# Patient Record
Sex: FEMALE | Race: WHITE | HISPANIC OR LATINO | Employment: UNEMPLOYED | URBAN - METROPOLITAN AREA
[De-identification: names, ages, dates, MRNs, and addresses within clinical notes are randomized per-mention and may not be internally consistent; named-entity substitution may affect disease eponyms.]

---

## 2017-03-20 DIAGNOSIS — Z01.812 PRE-OPERATIVE LABORATORY EXAMINATION: ICD-10-CM

## 2017-03-20 LAB
ANION GAP SERPL CALC-SCNC: 8 MMOL/L (ref 0–11.9)
BUN SERPL-MCNC: 19 MG/DL (ref 8–22)
CALCIUM SERPL-MCNC: 9.6 MG/DL (ref 8.5–10.5)
CHLORIDE SERPL-SCNC: 96 MMOL/L (ref 96–112)
CO2 SERPL-SCNC: 29 MMOL/L (ref 20–33)
CREAT SERPL-MCNC: 3.92 MG/DL (ref 0.5–1.4)
GFR SERPL CREATININE-BSD FRML MDRD: 15 ML/MIN/1.73 M 2
GLUCOSE SERPL-MCNC: 101 MG/DL (ref 65–99)
POTASSIUM SERPL-SCNC: 3.8 MMOL/L (ref 3.6–5.5)
SODIUM SERPL-SCNC: 133 MMOL/L (ref 135–145)

## 2017-03-20 PROCEDURE — 80048 BASIC METABOLIC PNL TOTAL CA: CPT

## 2017-03-20 PROCEDURE — 36415 COLL VENOUS BLD VENIPUNCTURE: CPT

## 2017-03-20 RX ORDER — MYCOPHENOLIC ACID 360 MG/1
360 TABLET, DELAYED RELEASE ORAL 2 TIMES DAILY
COMMUNITY
End: 2017-08-26

## 2017-03-20 RX ORDER — HYDROXYCHLOROQUINE SULFATE 200 MG/1
200 TABLET, FILM COATED ORAL
COMMUNITY
End: 2017-08-31 | Stop reason: SDUPTHER

## 2017-03-20 RX ORDER — PREDNISONE 5 MG/1
5 TABLET ORAL
COMMUNITY
End: 2017-11-23

## 2017-03-20 RX ORDER — ATENOLOL 25 MG/1
25 TABLET ORAL
COMMUNITY
End: 2017-12-01 | Stop reason: SDUPTHER

## 2017-03-21 ENCOUNTER — HOSPITAL ENCOUNTER (OUTPATIENT)
Facility: MEDICAL CENTER | Age: 22
End: 2017-03-21
Attending: SURGERY | Admitting: SURGERY
Payer: COMMERCIAL

## 2017-03-21 ENCOUNTER — APPOINTMENT (OUTPATIENT)
Dept: RADIOLOGY | Facility: MEDICAL CENTER | Age: 22
End: 2017-03-21
Attending: SURGERY
Payer: COMMERCIAL

## 2017-03-21 VITALS
OXYGEN SATURATION: 100 % | HEIGHT: 67 IN | HEART RATE: 78 BPM | SYSTOLIC BLOOD PRESSURE: 106 MMHG | TEMPERATURE: 98.6 F | BODY MASS INDEX: 24.05 KG/M2 | WEIGHT: 153.22 LBS | RESPIRATION RATE: 18 BRPM | DIASTOLIC BLOOD PRESSURE: 69 MMHG

## 2017-03-21 DIAGNOSIS — I77.0 ARTERIOVENOUS FISTULA, ACQUIRED (HCC): ICD-10-CM

## 2017-03-21 LAB
ANION GAP SERPL CALC-SCNC: 12 MMOL/L (ref 0–11.9)
BUN SERPL-MCNC: 28 MG/DL (ref 8–22)
CALCIUM SERPL-MCNC: 9.1 MG/DL (ref 8.5–10.5)
CHLORIDE SERPL-SCNC: 100 MMOL/L (ref 96–112)
CO2 SERPL-SCNC: 24 MMOL/L (ref 20–33)
CREAT SERPL-MCNC: 5.62 MG/DL (ref 0.5–1.4)
GFR SERPL CREATININE-BSD FRML MDRD: 10 ML/MIN/1.73 M 2
GLUCOSE SERPL-MCNC: 89 MG/DL (ref 65–99)
HCG SERPL QL: NEGATIVE
POTASSIUM SERPL-SCNC: 4.3 MMOL/L (ref 3.6–5.5)
SODIUM SERPL-SCNC: 136 MMOL/L (ref 135–145)

## 2017-03-21 PROCEDURE — 4410202 IR-INTRO PTA DIALYSIS CIRCUIT IMG S&I

## 2017-03-21 PROCEDURE — 84703 CHORIONIC GONADOTROPIN ASSAY: CPT

## 2017-03-21 PROCEDURE — 36902 INTRO CATH DIALYSIS CIRCUIT: CPT

## 2017-03-21 PROCEDURE — 80048 BASIC METABOLIC PNL TOTAL CA: CPT

## 2017-03-21 PROCEDURE — 160002 HCHG RECOVERY MINUTES (STAT)

## 2017-03-21 PROCEDURE — A9270 NON-COVERED ITEM OR SERVICE: HCPCS

## 2017-03-21 PROCEDURE — 700117 HCHG RX CONTRAST REV CODE 255: Performed by: SURGERY

## 2017-03-21 PROCEDURE — 700102 HCHG RX REV CODE 250 W/ 637 OVERRIDE(OP)

## 2017-03-21 PROCEDURE — 700111 HCHG RX REV CODE 636 W/ 250 OVERRIDE (IP)

## 2017-03-21 RX ORDER — MIDAZOLAM HYDROCHLORIDE 1 MG/ML
INJECTION INTRAMUSCULAR; INTRAVENOUS
Status: COMPLETED
Start: 2017-03-21 | End: 2017-03-21

## 2017-03-21 RX ORDER — OXYCODONE HYDROCHLORIDE 5 MG/1
TABLET ORAL
Status: COMPLETED
Start: 2017-03-21 | End: 2017-03-21

## 2017-03-21 RX ORDER — SODIUM CHLORIDE 9 MG/ML
500 INJECTION, SOLUTION INTRAVENOUS PRN
Status: DISCONTINUED | OUTPATIENT
Start: 2017-03-21 | End: 2017-03-21 | Stop reason: HOSPADM

## 2017-03-21 RX ORDER — IODIXANOL 270 MG/ML
24 INJECTION, SOLUTION INTRAVASCULAR ONCE
Status: COMPLETED | OUTPATIENT
Start: 2017-03-21 | End: 2017-03-21

## 2017-03-21 RX ORDER — DEXTROSE AND SODIUM CHLORIDE 5; .45 G/100ML; G/100ML
INJECTION, SOLUTION INTRAVENOUS CONTINUOUS
Status: DISCONTINUED | OUTPATIENT
Start: 2017-03-21 | End: 2017-03-21 | Stop reason: HOSPADM

## 2017-03-21 RX ORDER — MIDAZOLAM HYDROCHLORIDE 1 MG/ML
.5-2 INJECTION INTRAMUSCULAR; INTRAVENOUS PRN
Status: ACTIVE | OUTPATIENT
Start: 2017-03-21 | End: 2017-03-21

## 2017-03-21 RX ORDER — OXYCODONE HYDROCHLORIDE 5 MG/1
2.5 TABLET ORAL
Status: DISCONTINUED | OUTPATIENT
Start: 2017-03-21 | End: 2017-03-21 | Stop reason: HOSPADM

## 2017-03-21 RX ADMIN — MIDAZOLAM HYDROCHLORIDE 1 MG: 1 INJECTION INTRAMUSCULAR; INTRAVENOUS at 10:23

## 2017-03-21 RX ADMIN — OXYCODONE HYDROCHLORIDE 2.5 MG: 5 TABLET ORAL at 13:45

## 2017-03-21 RX ADMIN — FENTANYL CITRATE 25 MCG: 50 INJECTION, SOLUTION INTRAMUSCULAR; INTRAVENOUS at 10:23

## 2017-03-21 RX ADMIN — MIDAZOLAM HYDROCHLORIDE 1 MG: 1 INJECTION INTRAMUSCULAR; INTRAVENOUS at 10:28

## 2017-03-21 RX ADMIN — IODIXANOL 24 ML: 270 INJECTION, SOLUTION INTRAVASCULAR at 11:45

## 2017-03-21 RX ADMIN — MIDAZOLAM 1 MG: 1 INJECTION INTRAMUSCULAR; INTRAVENOUS at 10:23

## 2017-03-21 RX ADMIN — MIDAZOLAM HYDROCHLORIDE 1 MG: 1 INJECTION INTRAMUSCULAR; INTRAVENOUS at 11:10

## 2017-03-21 RX ADMIN — OXYCODONE HYDROCHLORIDE 2.5 MG: 5 TABLET ORAL at 12:00

## 2017-03-21 RX ADMIN — DEXTROSE AND SODIUM CHLORIDE: 5; .45 INJECTION, SOLUTION INTRAVENOUS at 08:30

## 2017-03-21 RX ADMIN — MIDAZOLAM HYDROCHLORIDE 1 MG: 1 INJECTION INTRAMUSCULAR; INTRAVENOUS at 10:51

## 2017-03-21 ASSESSMENT — PAIN SCALES - GENERAL
PAINLEVEL_OUTOF10: 4
PAINLEVEL_OUTOF10: 5
PAINLEVEL_OUTOF10: 2
PAINLEVEL_OUTOF10: 4
PAINLEVEL_OUTOF10: 0
PAINLEVEL_OUTOF10: 0
PAINLEVEL_OUTOF10: 2
PAINLEVEL_OUTOF10: 6
PAINLEVEL_OUTOF10: 3
PAINLEVEL_OUTOF10: 2

## 2017-03-21 NOTE — OP REPORT
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    POSTOPERATIVE DIAGNOSES:  Stage V kidney disease with right arm   brachiocephalic arteriovenous fistula with severe stenoses.    OPERATIONS PERFORMED:  1.  Antegrade and retrograde sheath placements, right arm under ultrasound and   fluoroscopic guidance.  2.  Retrograde and antegrade fistulogram and central venogram.  3.  Balloon angioplasty proximal mid arm with 7 mm Conquest and 8 mm Amonate.  4.  Balloon angioplasty cephalic arch with 7 mm Conquest and 8 mm Amonate.  5.  Balloon angioplasty proximal 8-10 cm of the AV fistula with a 6 mm x 6 cm   Ravi balloon.    SURGEON:  Erwin Livingston MD    ANESTHESIA:  Local and moderate conscious sedation.    SUMMARY OF FINDINGS:  A focal greater than 90% stenosis in the proximal mid   arm region that was short in length.  A similar degree of stenosis in the   cephalic arch that was 4 cm long.  The initial 8-10 cm of the fistula starting   at the anastomosis was approximately 5 mm in diameter, which was   significantly smaller than the overall fistula that was at least 8-9 mm in   diameter.  This area was characterized by some mild stenoses that we treated   in case they were more severe than the images suggested and in view of the   fact that we wanted to ensure satisfactory inflow.    DESCRIPTION OF PROCEDURE:  After obtaining informed consent, the patient was   positioned supine on the angiography table.  She was given moderate conscious   sedation and local anesthesia was also used through the sheath placements.    She tolerated the procedure well and remained stable.  Initially, I examined   the right arm region with ultrasound and just proximal to the distal site of   angioaccess, I placed a micropuncture needle, wire, and dilator directed   toward the shoulder.  Injection through the 5-Djiboutian dilator  imaged the   fistula and demonstrated the findings that I have described.  I placed a short   6-Romanian sheath at this location and then inflated a 7 mm Conquest balloon   across the stenosis in the proximal mid arm.  Then, I injected contrast with   reflux through the arterial end-to-end anastomosis showing a small brachial   artery with an adequate diameter arterial anastomosis and irregularity of a   long narrow segment that became suddenly larger in the mid distal biceps   region where the fistula has been accessed.  A Glidewire was passed through   the 2 lesions and we dilated the both with 7 mm Conquest.  After dilating to   greater than nominal diameter, the cephalic arch opening looked very small and   in fact looked substantially worse.  Flow was still present though and there   was no extravasation.  We placed an 8 mm Askov trying to improve the   insufficient dilatation that the 7 mm balloon had created.  We went up to 18   atmospheres in the mid arm and proximally 14 atmospheres at the cephalic arch   and both of these resulted in improvement of both lesions over their   pretreatment state, although there were certainly still smaller areas and they   ballooned out main channel of the fistula.  Flow was still rapid.  Next, I   placed a micropuncture needle, wire, and dilator directed toward the elbow and   placed a 4-Romanian sheath and a V-14 wire across the proximal fistula into the   brachial artery.  I dilated up to 4 atmospheres with a 6 mm Ravi balloon.    The actual diameter of the vein at this segment was 5 mm, so the dilatation   was adequate and it appeared to show some release of mild wasting of the   balloon as it was inflated in 3 locations to cover the entire segment.  Flow   was excellent at the end of procedure and the fistula nonpulsatile.  We   removed the sheath and held pressure for 6 minutes with good hemostasis.    Blood loss was less than 30 mL.  Tegaderm and gauze dressings were  applied.       ____________________________________     MD TREASURE SALEH / MADHU    DD:  03/21/2017 11:35:49  DT:  03/21/2017 12:34:49    D#:  [id number]  Job#:  [id number]

## 2017-03-21 NOTE — PROGRESS NOTES
IR Procedure Note:    Dr. Livingston performed a right arm fistulogram and angioplasty.  The pt tolerated the procedure well, vital signs stable.  Gauze and tegaderm applied to right forearm, pressure held x 10 minutes, CDI and soft.  Report given to Ander MARTINEZ.  PT and family transported back to [facility].

## 2017-03-21 NOTE — IP AVS SNAPSHOT
" Home Care Instructions                                                                                                                Name:Lily Nicole  Medical Record Number:[id number]  CSN: [id number]    YOB: 1995   Age: 21 y.o.  Sex: female  HT:1.702 m (5' 7\") (86 %, Z = 1.07, Source: [facility] 2-20 Years) WT: 69.5 kg (153 lb 3.5 oz) (84 %, Z = 0.98, Source: [facility] 2-20 Years)          Admit Date: 3/21/2017     Discharge Date:   Today's Date: 3/21/2017  Attending Doctor:  Erwin Livingston M.D.                  Allergies:  Review of patient's allergies indicates no known allergies.                Discharge Instructions         ACTIVITY: Rest and take it easy for the first 24 hours.  A responsible adult is recommended to remain with you during that time.  It is normal to feel sleepy.  We encourage you to not do anything that requires balance, judgment or coordination.    MILD FLU-LIKE SYMPTOMS ARE NORMAL. YOU MAY EXPERIENCE GENERALIZED MUSCLE ACHES, THROAT IRRITATION, HEADACHE AND/OR SOME NAUSEA.    FOR 24 HOURS DO NOT:  Drive, operate machinery or run household appliances.  Drink beer or alcoholic beverages.   Make important decisions or sign legal documents.    SPECIAL INSTRUCTIONS: Refer to av fistula information. No pushing, pulling or forceful movement for 24 hours.     DIET: To avoid nausea, slowly advance diet as tolerated, avoiding spicy or greasy foods for the first day.  Add more substantial food to your diet according to your physician's instructions.  Babies can be fed formula or breast milk as soon as they are hungry.  INCREASE FLUIDS AND FIBER TO AVOID CONSTIPATION.    SURGICAL DRESSING/BATHING: May remove dressing in 48 hours. May shower in 48 hours. Do not submerge site for 5 days.     FOLLOW-UP APPOINTMENT:  A follow-up appointment should be arranged with your doctor; call to schedule.    You should CALL YOUR PHYSICIAN if you develop:  Fever greater than 101 degrees F.  Pain not relieved by " medication, or persistent nausea or vomiting.  Excessive bleeding (blood soaking through dressing) or unexpected drainage from the wound.  Extreme redness or swelling around the incision site, drainage of pus or foul smelling drainage.  Inability to urinate or empty your bladder within 8 hours.  Problems with breathing or chest pain.    You should call 911 if you develop problems with breathing or chest pain.  If you are unable to contact your doctor or surgical center, you should go to the nearest emergency room or urgent care center.  Physician's telephone #: [phone]    If any questions arise, call your doctor.  If your doctor is not available, please feel free to call the Surgical Center at [phone].  The Center is open Monday through Friday from 7AM to 7PM.  You can also call the for[MD] HOTLINE open 24 hours/day, 7 days/week and speak to a nurse at [phone], or toll free at [phone].    A registered nurse may call you a few days after your surgery to see how you are doing after your procedure.    MEDICATIONS: Resume taking daily medication.  Take prescribed pain medication with food.  If no medication is prescribed, you may take non-aspirin pain medication if needed.  PAIN MEDICATION CAN BE VERY CONSTIPATING.  Take a stool softener or laxative such as senokot, pericolace, or milk of magnesia if needed.      Last pain medication given at 1200.    If your physician has prescribed pain medication that includes Acetaminophen (Tylenol), do not take additional Acetaminophen (Tylenol) while taking the prescribed medication.    Depression / Suicide Risk    As you are discharged from this [facility] facility, it is important to learn how to keep safe from harming yourself.    Recognize the warning signs:  · Abrupt changes in personality, positive or negative- including increase in energy   · Giving away possessions  · Change in eating patterns- significant weight changes-  positive or  negative  · Change in sleeping patterns- unable to sleep or sleeping all the time   · Unwillingness or inability to communicate  · Depression  · Unusual sadness, discouragement and loneliness  · Talk of wanting to die  · Neglect of personal appearance   · Rebelliousness- reckless behavior  · Withdrawal from people/activities they love  · Confusion- inability to concentrate     If you or a loved one observes any of these behaviors or has concerns about self-harm, here's what you can do:  · Talk about it- your feelings and reasons for harming yourself  · Remove any means that you might use to hurt yourself (examples: pills, rope, extension cords, firearm)  · Get professional help from the community (Mental Health, Substance Abuse, psychological counseling)  · Do not be alone:Call your Safe Contact- someone whom you trust who will be there for you.  · Call your local CRISIS HOTLINE 845-9630 or 123-803-5786  · Call your local Children's Mobile Crisis Response Team [place] (401) 189-7625 or www.[employer]le  · Call the toll free National Suicide Prevention Hotlines   · National Suicide Prevention Lifeline 943-630-DAJE (7492)  · National Hope Line Network 800-SUICIDE (962-9433)    AV Fistula, Care After  Refer to this sheet in the next few weeks. These instructions provide you with information on caring for yourself after your procedure. Your caregiver may also give you more specific instructions. Your treatment has been planned according to current medical practices, but problems sometimes occur. Call your caregiver if you have any problems or questions after your procedure.  HOME CARE INSTRUCTIONS   · Do not drive a car or take public transportation alone.  · Do not drink alcohol.  · Only take medicine that has been prescribed by your caregiver.  · Do not sign important papers or make important decisions.  · Have a responsible person with you.  · Ask your caregiver to show you how to check your access at home  "for a vibration (called a \"thrill\") or for a sound (called a \"bruit\" pronounced brew-ee).  · Your vein will need time to enlarge and mature so needles can be inserted for dialysis. Follow your caregiver's instructions about what you need to do to make this happen.  · Keep dressings clean and dry.  · Keep the arm elevated above your heart. Use a pillow.  · Rest.  · Use the arm as usual for all activities.  · Have the stitches or tape closures removed in 10 to 14 days, or as directed by your caregiver.  · Do not sleep or lie on the area of the fistula or that arm. This may decrease or stop the blood flow through your fistula.  · Do not allow blood pressures to be taken on this arm.  · Do not allow blood drawing to be done from the graft.  · Do not wear tight clothing around the access site or on the arm.  · Avoid lifting heavy objects with the arm that has the fistula.  · Do not use creams or lotions over the access site.  SEEK MEDICAL CARE IF:   · You have a fever.  · You have swelling around the fistula that gets worse, or you have new pain.  · You have unusual bleeding at the fistula site or from any other area.  · You have pus or other drainage at the fistula site.  · You have skin redness or red streaking on the skin around, above, or below the fistula site.  · Your access site feels warm.  · You have any flu-like symptoms.  SEEK IMMEDIATE MEDICAL CARE IF:   · You have pain, numbness, or an unusual pale skin on the hand or on the side of your fistula.  · You have dizziness or weakness that you have not had before.  · You have shortness of breath.  · You have chest pain.  · Your fistula disconnects or breaks, and there is bleeding that cannot be easily controlled.  Call for local emergency medical help. Do not try to drive yourself to the hospital.  MAKE SURE YOU  · Understand these instructions.  · Will watch your condition.  · Will get help right away if you are not doing well or get worse.     This information " is not intended to replace advice given to you by your health care provider. Make sure you discuss any questions you have with your health care provider.     Document Released: 12/18/2006 Document Revised: 01/08/2016 Document Reviewed: 06/06/2012  [employer] Interactive Patient Education ©2016 [employer]         Medication List      CONTINUE taking these medications        Instructions    atenolol 25 MG Tabs   Commonly known as:  TENORMIN    Take 25 mg by mouth every bedtime.   Dose:  25 mg       hydroxychloroquine 200 MG Tabs   Commonly known as:  PLAQUENIL    Take 400 mg by mouth every bedtime.   Dose:  400 mg       MYFORTIC 360 MG Tbec tablet   Generic drug:  mycophenolate sodium    Take 360 mg by mouth 2 Times a Day.   Dose:  360 mg       predniSONE 5 MG Tabs   Commonly known as:  DELTASONE    Take 5 mg by mouth every bedtime.   Dose:  5 mg       ZOFRAN 4 MG Tabs tablet   Generic drug:  ondansetron    Take 4 mg by mouth every 6 hours as needed.   Dose:  4 mg               Medication Information     Above and/or attached are the medications your physician expects you to take upon discharge. Review all of your home medications and newly ordered medications with your doctor and/or pharmacist. Follow medication instructions as directed by your doctor and/or pharmacist. Please keep your medication list with you and share with your physician. Update the information when medications are discontinued, doses are changed, or new medications (including over-the-counter products) are added; and carry medication information at all times in the event of emergency situations.        Resources     Quit Smoking / Tobacco Use:    I understand the use of any tobacco products increases my chance of suffering from future heart disease or stroke and could cause other illnesses which may shorten my life. Quitting the use of tobacco products is the single most important thing I can do to improve my health. For further information on  smoking / tobacco cessation call a Toll Free Quit Line at 1-393.586.7581 (*National Cancer [place]) or 1-685.530.6351 (American Lung Association) or you can access the web based program at www.lungusa.org.    [place] Tobacco Users Help Line:  (137) 501-8193       Toll Free: 1-735.958.7101  Quit Tobacco Program [facility] (741)717-9935    Crisis Hotline:    [place] Crisis Hotline:  9-578-CDYCXDP or 1-894.387.2167    [place] Crisis Hotline:    1-392.630.7450 or 359-640-6295    Discharge Survey:   Thank you for choosing [facility]. We hope we did everything we could to make your hospital stay a pleasant one. You may be receiving a survey and we would appreciate your time and participation in answering the questions. Your input is very valuable to us in our efforts to improve our service to our patients and their families.            Signatures     My signature on this form indicates that:    1. I acknowledge receipt and understanding of these Home Care Instruction.  2. My questions regarding this information have been answered to my satisfaction.  3. I have formulated a plan with my discharge nurse to obtain my prescribed medications for home.    __________________________________      __________________________________                   Patient Signature                                 Guardian/Responsible Adult Signature      __________________________________                 __________       ________                       Nurse Signature                                               Date                 Time

## 2017-03-21 NOTE — PROGRESS NOTES
Pt having pain. Called Dr. Livingston for order. Order for another dose of Oxy IR ordered. And also received order for meds for home. Mother to  Rx at office.     Pt. fistula site CDI with no s/s of bleeding or hematoma. Pt. Meets discharge criteria. Pt. And responsible adult given discharge instructions. Pt. And responsible adult educated and all questions answered. Signed copy on chart. All lines and drains DC'd. Pt. Belongings with Pt and Pt. Transported via wheel chair to car with escort.

## 2017-03-21 NOTE — IP AVS SNAPSHOT
3/21/2017          Lily Nicole  [address] [place] [address]    Dear Lily:    [facility] wants to ensure your discharge home is safe and you or your loved ones have had all your questions answered regarding your care after you leave the hospital.    You may receive a telephone call within two days of your discharge.  This call is to make certain you understand your discharge instructions as well as ensure we provided you with the best care possible during your stay with us.     The call will only last approximately 3-5 minutes and will be done by a nurse.    Once again, we want to ensure your discharge home is safe and that you have a clear understanding of any next steps in your care.  If you have any questions or concerns, please do not hesitate to contact us, we are here for you.  Thank you for choosing [facility] for your healthcare needs.    Sincerely,    Jamal Lott    [facility]

## 2017-03-21 NOTE — PROGRESS NOTES
Report received and Pt admitted to [facility] 3 s/p fistulagram. Site CDI with no s/s of bleeding or hematoma. Thrill and bruit present. Pt attached to all leads and monitors. VSS with complaints of pain and no nausea. Fluids and food offered. Orders reviewed.  Family at bedside.     1200: pain medication given see mar.

## 2017-03-21 NOTE — DISCHARGE INSTRUCTIONS
ACTIVITY: Rest and take it easy for the first 24 hours.  A responsible adult is recommended to remain with you during that time.  It is normal to feel sleepy.  We encourage you to not do anything that requires balance, judgment or coordination.    MILD FLU-LIKE SYMPTOMS ARE NORMAL. YOU MAY EXPERIENCE GENERALIZED MUSCLE ACHES, THROAT IRRITATION, HEADACHE AND/OR SOME NAUSEA.    FOR 24 HOURS DO NOT:  Drive, operate machinery or run household appliances.  Drink beer or alcoholic beverages.   Make important decisions or sign legal documents.    SPECIAL INSTRUCTIONS: Refer to av fistula information. No pushing, pulling or forceful movement for 24 hours.     DIET: To avoid nausea, slowly advance diet as tolerated, avoiding spicy or greasy foods for the first day.  Add more substantial food to your diet according to your physician's instructions.  Babies can be fed formula or breast milk as soon as they are hungry.  INCREASE FLUIDS AND FIBER TO AVOID CONSTIPATION.    SURGICAL DRESSING/BATHING: May remove dressing in 48 hours. May shower in 48 hours. Do not submerge site for 5 days.     FOLLOW-UP APPOINTMENT:  A follow-up appointment should be arranged with your doctor; call to schedule.    You should CALL YOUR PHYSICIAN if you develop:  Fever greater than 101 degrees F.  Pain not relieved by medication, or persistent nausea or vomiting.  Excessive bleeding (blood soaking through dressing) or unexpected drainage from the wound.  Extreme redness or swelling around the incision site, drainage of pus or foul smelling drainage.  Inability to urinate or empty your bladder within 8 hours.  Problems with breathing or chest pain.    You should call 911 if you develop problems with breathing or chest pain.  If you are unable to contact your doctor or surgical center, you should go to the nearest emergency room or urgent care center.  Physician's telephone #: [phone]    If any questions arise, call your doctor.  If your doctor is  not available, please feel free to call the Surgical Center at [phone].  The Center is open Monday through Friday from 7AM to 7PM.  You can also call the HEALTH HOTLINE open 24 hours/day, 7 days/week and speak to a nurse at [phone], or toll free at [phone].    A registered nurse may call you a few days after your surgery to see how you are doing after your procedure.    MEDICATIONS: Resume taking daily medication.  Take prescribed pain medication with food.  If no medication is prescribed, you may take non-aspirin pain medication if needed.  PAIN MEDICATION CAN BE VERY CONSTIPATING.  Take a stool softener or laxative such as senokot, pericolace, or milk of magnesia if needed.      Last pain medication given at 1200.    If your physician has prescribed pain medication that includes Acetaminophen (Tylenol), do not take additional Acetaminophen (Tylenol) while taking the prescribed medication.    Depression / Suicide Risk    As you are discharged from this [facility] facility, it is important to learn how to keep safe from harming yourself.    Recognize the warning signs:  · Abrupt changes in personality, positive or negative- including increase in energy   · Giving away possessions  · Change in eating patterns- significant weight changes-  positive or negative  · Change in sleeping patterns- unable to sleep or sleeping all the time   · Unwillingness or inability to communicate  · Depression  · Unusual sadness, discouragement and loneliness  · Talk of wanting to die  · Neglect of personal appearance   · Rebelliousness- reckless behavior  · Withdrawal from people/activities they love  · Confusion- inability to concentrate     If you or a loved one observes any of these behaviors or has concerns about self-harm, here's what you can do:  · Talk about it- your feelings and reasons for harming yourself  · Remove any means that you might use to hurt yourself (examples: pills, rope, extension cords,  "firearm)  · Get professional help from the community (Mental Health, Substance Abuse, psychological counseling)  · Do not be alone:Call your Safe Contact- someone whom you trust who will be there for you.  · Call your local CRISIS HOTLINE 686-7441 or 039-237-0605  · Call your local Children's Mobile Crisis Response Team [place] (139) 274-9859 or www.[employer]  · Call the toll free National Suicide Prevention Hotlines   · National Suicide Prevention Lifeline 486-913-VHRO (0500)  · National Hope Line Network 800-SUICIDE (536-8065)    AV Fistula, Care After  Refer to this sheet in the next few weeks. These instructions provide you with information on caring for yourself after your procedure. Your caregiver may also give you more specific instructions. Your treatment has been planned according to current medical practices, but problems sometimes occur. Call your caregiver if you have any problems or questions after your procedure.  HOME CARE INSTRUCTIONS   · Do not drive a car or take public transportation alone.  · Do not drink alcohol.  · Only take medicine that has been prescribed by your caregiver.  · Do not sign important papers or make important decisions.  · Have a responsible person with you.  · Ask your caregiver to show you how to check your access at home for a vibration (called a \"thrill\") or for a sound (called a \"bruit\" pronounced brew-ee).  · Your vein will need time to enlarge and mature so needles can be inserted for dialysis. Follow your caregiver's instructions about what you need to do to make this happen.  · Keep dressings clean and dry.  · Keep the arm elevated above your heart. Use a pillow.  · Rest.  · Use the arm as usual for all activities.  · Have the stitches or tape closures removed in 10 to 14 days, or as directed by your caregiver.  · Do not sleep or lie on the area of the fistula or that arm. This may decrease or stop the blood flow through your fistula.  · Do not allow blood " pressures to be taken on this arm.  · Do not allow blood drawing to be done from the graft.  · Do not wear tight clothing around the access site or on the arm.  · Avoid lifting heavy objects with the arm that has the fistula.  · Do not use creams or lotions over the access site.  SEEK MEDICAL CARE IF:   · You have a fever.  · You have swelling around the fistula that gets worse, or you have new pain.  · You have unusual bleeding at the fistula site or from any other area.  · You have pus or other drainage at the fistula site.  · You have skin redness or red streaking on the skin around, above, or below the fistula site.  · Your access site feels warm.  · You have any flu-like symptoms.  SEEK IMMEDIATE MEDICAL CARE IF:   · You have pain, numbness, or an unusual pale skin on the hand or on the side of your fistula.  · You have dizziness or weakness that you have not had before.  · You have shortness of breath.  · You have chest pain.  · Your fistula disconnects or breaks, and there is bleeding that cannot be easily controlled.  Call for local emergency medical help. Do not try to drive yourself to the hospital.  MAKE SURE YOU  · Understand these instructions.  · Will watch your condition.  · Will get help right away if you are not doing well or get worse.     This information is not intended to replace advice given to you by your health care provider. Make sure you discuss any questions you have with your health care provider.     Document Released: 12/18/2006 Document Revised: 01/08/2016 Document Reviewed: 06/06/2012  [employer] Interactive Patient Education ©2016 [employer]

## 2017-03-29 ENCOUNTER — HOSPITAL ENCOUNTER (OUTPATIENT)
Dept: LAB | Facility: MEDICAL CENTER | Age: 22
End: 2017-03-29
Attending: PHYSICIAN ASSISTANT
Payer: COMMERCIAL

## 2017-03-29 LAB
C3 SERPL-MCNC: 149 MG/DL (ref 87–200)
C4 SERPL-MCNC: 33 MG/DL (ref 19–52)

## 2017-03-29 PROCEDURE — 86225 DNA ANTIBODY NATIVE: CPT

## 2017-03-29 PROCEDURE — 86160 COMPLEMENT ANTIGEN: CPT | Mod: 91

## 2017-03-29 PROCEDURE — 36415 COLL VENOUS BLD VENIPUNCTURE: CPT

## 2017-03-31 LAB — DSDNA AB TITR SER CLIF: NORMAL {TITER}

## 2017-05-18 ENCOUNTER — HOSPITAL ENCOUNTER (EMERGENCY)
Facility: MEDICAL CENTER | Age: 22
End: 2017-05-19
Payer: COMMERCIAL

## 2017-05-18 VITALS
TEMPERATURE: 99.1 F | OXYGEN SATURATION: 98 % | RESPIRATION RATE: 18 BRPM | DIASTOLIC BLOOD PRESSURE: 75 MMHG | HEIGHT: 67 IN | WEIGHT: 150.35 LBS | SYSTOLIC BLOOD PRESSURE: 110 MMHG | BODY MASS INDEX: 23.6 KG/M2 | HEART RATE: 93 BPM

## 2017-05-18 PROCEDURE — 302449 STATCHG TRIAGE ONLY (STATISTIC)

## 2017-05-18 ASSESSMENT — PAIN SCALES - GENERAL: PAINLEVEL_OUTOF10: 6

## 2017-05-19 ENCOUNTER — OFFICE VISIT (OUTPATIENT)
Dept: URGENT CARE | Facility: PHYSICIAN GROUP | Age: 22
End: 2017-05-19
Payer: COMMERCIAL

## 2017-05-19 VITALS
TEMPERATURE: 96.7 F | WEIGHT: 149.69 LBS | HEIGHT: 67 IN | SYSTOLIC BLOOD PRESSURE: 106 MMHG | BODY MASS INDEX: 23.49 KG/M2 | HEART RATE: 88 BPM | DIASTOLIC BLOOD PRESSURE: 62 MMHG | OXYGEN SATURATION: 98 %

## 2017-05-19 DIAGNOSIS — L30.9 DERMATITIS: ICD-10-CM

## 2017-05-19 DIAGNOSIS — L29.9 PRURITUS: ICD-10-CM

## 2017-05-19 PROCEDURE — 99203 OFFICE O/P NEW LOW 30 MIN: CPT | Performed by: PHYSICIAN ASSISTANT

## 2017-05-19 RX ORDER — HYDROXYZINE HYDROCHLORIDE 25 MG/1
25 TABLET, FILM COATED ORAL 3 TIMES DAILY PRN
Qty: 30 TAB | Refills: 0 | Status: SHIPPED | OUTPATIENT
Start: 2017-05-19 | End: 2017-08-01

## 2017-05-19 RX ORDER — TRIAMCINOLONE ACETONIDE 1 MG/ML
LOTION TOPICAL
Qty: 1 BOTTLE | Refills: 1 | Status: SHIPPED | OUTPATIENT
Start: 2017-05-19 | End: 2017-07-29

## 2017-05-19 ASSESSMENT — ENCOUNTER SYMPTOMS
DIARRHEA: 0
VOMITING: 0
CHILLS: 0
ABDOMINAL PAIN: 0
MUSCULOSKELETAL NEGATIVE: 1
DIZZINESS: 0
NAIL CHANGES: 0
HEADACHES: 0
SHORTNESS OF BREATH: 0
FEVER: 0
NAUSEA: 0

## 2017-05-19 NOTE — ED NOTES
"Chief Complaint   Patient presents with   • Rash     since this afternoon. Began as itchy rash on right hand, now spread \"all over\".     Ambulatory to triage for above. VSS. NAD. Explained triage process, to waiting room. Asked to inform RN if questions or concerns arise.   "

## 2017-05-19 NOTE — MR AVS SNAPSHOT
"        Lily Nicole   2017 10:10 AM   Office Visit   MRN: [id number]    Department:  [facility]   Dept Phone:  [phone]    Description:  Female : 1995   Provider:  Selam Sarabia PA-C           Reason for Visit     Rash rash on whole body x 1 day, itchy & painful      Allergies as of 2017     No Known Allergies      You were diagnosed with     Dermatitis   [888090]       Pruritus   [788405]         Vital Signs     Blood Pressure Pulse Temperature Height Weight Body Mass Index    106/62 mmHg 88 35.9 °C (96.7 °F) 1.702 m (5' 7\") 67.9 kg (149 lb 11.1 oz) 23.44 kg/m2    Oxygen Saturation Smoking Status                98% Never Smoker           Basic Information     Date Of Birth Sex Race Ethnicity Preferred Language    1995 Female  or   Origin (Danish,Panamanian,Macanese,[place]er, etc) English      Your appointments     Aug 14, 2017  3:20 PM   New Patient with Jeana London M.D.   [facility] (--)    [address] [place] [address], [address]  [facility] [id number] [phone]           Please bring Photo ID, Insurance Cards, All Medication Bottles and copies of any legal documents (such as Living Will, Power of ) If speaking a language besides English please bring an adult . Please arrive 30 minutes prior for check in and registration. You will be receiving a confirmation call a few days before your appointment from our automated call confirmation system.              Problem List              ICD-10-CM Priority Class Noted - Resolved    Arteriovenous fistula, acquired (CMS-HCC) I77.0   3/21/2017 - Present      Health Maintenance     Patient has no pending health maintenance at this time      Current Immunizations     No immunizations on file.      Below and/or attached are the medications your provider expects you to take. Review all of your home medications and newly ordered medications with your provider and/or pharmacist. Follow " medication instructions as directed by your provider and/or pharmacist. Please keep your medication list with you and share with your provider. Update the information when medications are discontinued, doses are changed, or new medications (including over-the-counter products) are added; and carry medication information at all times in the event of emergency situations     Allergies:  No Known Allergies          Medications  Valid as of: May 19, 2017 - 12:38 PM    Generic Name Brand Name Tablet Size Instructions for use    Atenolol (Tab) TENORMIN 25 MG Take 25 mg by mouth every bedtime.        Hydroxychloroquine Sulfate (Tab) PLAQUENIL 200 MG Take 400 mg by mouth every bedtime.        HydrOXYzine HCl (Tab) ATARAX 25 MG Take 1 Tab by mouth 3 times a day as needed for Itching.        Mycophenolate Sodium (Tablet Delayed Response) MYFORTIC 360 MG Take 360 mg by mouth 2 Times a Day.        Ondansetron HCl (Tab) ZOFRAN 4 MG Take 4 mg by mouth every 6 hours as needed.        PredniSONE (Tab) DELTASONE 5 MG Take 5 mg by mouth every bedtime.        Triamcinolone Acetonide (Lotion) KENALOG 0.1 % Apply this film to affected areas twice daily for 1 week, then once daily for 1 week        .                 Medicines prescribed today were sent to:     [facility] [id number] [place] [address] [address] AT [facility] & [facility]    [address] [facility] [address]    Phone: [phone] Fax: [phone]    Open 24 Hours?: No    [facility] [address] [facility], [place] - [address] [facility]    [address] [facility] [address]    Phone: [phone] Fax: [phone]    Open 24 Hours?: No      Medication refill instructions:       If your prescription bottle indicates you have medication refills left, it is not necessary to call your provider’s office. Please contact your pharmacy and they will refill your medication.    If your prescription bottle indicates you do not have any refills left, you may  request refills at any time through one of the following ways: The online Simple IT system (except Urgent Care), by calling your provider’s office, or by asking your pharmacy to contact your provider’s office with a refill request. Medication refills are processed only during regular business hours and may not be available until the next business day. Your provider may request additional information or to have a follow-up visit with you prior to refilling your medication.   *Please Note: Medication refills are assigned a new Rx number when refilled electronically. Your pharmacy may indicate that no refills were authorized even though a new prescription for the same medication is available at the pharmacy. Please request the medicine by name with the pharmacy before contacting your provider for a refill.           Simple IT Access Code: [email or web address]  Expires: 6/18/2017 12:38 PM    Simple IT  A secure, online tool to manage your health information     How do you roll?’s Simple IT® is a secure, online tool that connects you to your personalized health information from the privacy of your home -- day or night - making it very easy for you to manage your healthcare. Once the activation process is completed, you can even access your medical information using the Simple IT tg, which is available for free in the Apple Tg store or Google Play store.     Simple IT provides the following levels of access (as shown below):   My Chart Features   [facility] Doctor [facility]  Urgent  Care Non-[facility]  Primary Care  Doctor   Email your healthcare team securely and privately 24/7 X X X    Manage appointments: schedule your next appointment; view details of past/upcoming appointments X      Request prescription refills. X      View recent personal medical records, including lab and immunizations X X X X   View health record, including health history, allergies, medications X X X X   Read reports about your outpatient  visits, procedures, consult and ER notes X X X X   See your discharge summary, which is a recap of your hospital and/or ER visit that includes your diagnosis, lab results, and care plan. X X       How to register for Inter[employer]:  1. Go to  [email or web address].  2. Click on the Sign Up Now box, which takes you to the New Member Sign Up page. You will need to provide the following information:  a. Enter your InterResolve Access Code exactly as it appears at the top of this page. (You will not need to use this code after you’ve completed the sign-up process. If you do not sign up before the expiration date, you must request a new code.)   b. Enter your date of birth.   c. Enter your home email address.   d. Click Submit, and follow the next screen’s instructions.  3. Create a InterResolve ID. This will be your [employer] login ID and cannot be changed, so think of one that is secure and easy to remember.  4. Create a InterResolve password. You can change your password at any time.  5. Enter your Password Reset Question and Answer. This can be used at a later time if you forget your password.   6. Enter your e-mail address. This allows you to receive e-mail notifications when new information is available in InterResolve.  7. Click Sign Up. You can now view your health information.    For assistance activating your InterResolve account, call [phone]

## 2017-05-19 NOTE — PROGRESS NOTES
"Subjective:      Lily Nicole is a 21 y.o. female who presents with Rash    Patient is accompanied by her mother.         Rash  This is a new problem. The current episode started yesterday. The problem is unchanged. The affected locations include the left wrist, right wrist, right elbow, left elbow, left lower leg and right lowerleg. She was exposed to nothing. Pertinent negatives include no diarrhea, facial edema, fever, joint pain, nail changes, shortness of breath or vomiting. Past treatments include nothing. There is no history of allergies, asthma or eczema.     Patient presents to urgent care for an extremely pruritic rash x 1 day. She states she first noticed it after getting out of the shower on her right hand, but it quickly spread to both elbows, both knees, and her back. She denies any history of known skin problems. No new soaps, detergents, lotions, or clothing. She tried taking benadryl last night for the itching without much relief. PMH includes Lupus and she is currently taking Plaquenil and Prednisone daily.     Review of Systems   Constitutional: Negative for fever and chills.   Respiratory: Negative for shortness of breath.    Cardiovascular: Negative for chest pain.   Gastrointestinal: Negative for nausea, vomiting, abdominal pain and diarrhea.   Genitourinary: Negative.    Musculoskeletal: Negative.  Negative for joint pain.   Skin: Positive for itching and rash. Negative for nail changes.   Neurological: Negative for dizziness and headaches.          Objective:     /62 mmHg  Pulse 88  Temp(Src) 35.9 °C (96.7 °F)  Ht 1.702 m (5' 7\")  Wt 67.9 kg (149 lb 11.1 oz)  BMI 23.44 kg/m2  SpO2 98%     Physical Exam   Constitutional: She is oriented to person, place, and time. She appears well-developed and well-nourished. No distress.   HENT:   Head: Normocephalic and atraumatic.   Eyes: Conjunctivae are normal. Pupils are equal, round, and reactive to light. Right eye exhibits no " discharge. Left eye exhibits no discharge.   Neck: Normal range of motion.   Cardiovascular: Normal rate.    Pulmonary/Chest: Effort normal.   Musculoskeletal: Normal range of motion.   Neurological: She is alert and oriented to person, place, and time.   Skin: Skin is warm and dry. Rash noted. Rash is maculopapular. She is not diaphoretic.        Psychiatric: She has a normal mood and affect. Her behavior is normal.   Nursing note and vitals reviewed.         PMH:  has a past medical history of Hypertension; Renal disorder; Seizure (CMS-HCC) (2013); Infectious disease; and Dialysis patient (CMS-HCC).  MEDS:   Current outpatient prescriptions:   •  triamcinolone (KENALOG) 0.1 % lotion, Apply this film to affected areas twice daily for 1 week, then once daily for 1 week, Disp: 1 Bottle, Rfl: 1  •  hydrOXYzine (ATARAX) 25 MG Tab, Take 1 Tab by mouth 3 times a day as needed for Itching., Disp: 30 Tab, Rfl: 0  •  atenolol (TENORMIN) 25 MG Tab, Take 25 mg by mouth every bedtime., Disp: , Rfl:   •  predniSONE (DELTASONE) 5 MG Tab, Take 5 mg by mouth every bedtime., Disp: , Rfl:   •  hydroxychloroquine (PLAQUENIL) 200 MG Tab, Take 400 mg by mouth every bedtime., Disp: , Rfl:   •  mycophenolate sodium (MYFORTIC) 360 MG Tablet Delayed Response tablet, Take 360 mg by mouth 2 Times a Day., Disp: , Rfl:   •  ondansetron (ZOFRAN) 4 MG TABS, Take 4 mg by mouth every 6 hours as needed., Disp: , Rfl:   ALLERGIES: No Known Allergies  SURGHX:   Past Surgical History   Procedure Laterality Date   • Gypsy by laparoscopy  4/5/2010     Performed by SYED MARTELL at SURGERY [facility]   • Other       renal biopsy x 3   • Other       bone marrow biopsy   • Av fistula creation       SOCHX:  reports that she has never smoked. She does not have any smokeless tobacco history on file. She reports that she does not drink alcohol or use illicit drugs.  FH: family history is not on file.       Assessment/Plan:     1. Dermatitis  -  triamcinolone (KENALOG) 0.1 % lotion; Apply this film to affected areas twice daily for 1 week, then once daily for 1 week  Dispense: 1 Bottle; Refill: 1    2. Pruritus  - hydrOXYzine (ATARAX) 25 MG Tab; Take 1 Tab by mouth 3 times a day as needed for Itching.  Dispense: 30 Tab; Refill: 0   - Will cause sedation, avoid driving, operating heavy machinery, and drinking alcohol      Advised patient to follow up with PCP if symptoms have not improved after 2 week course of topical steroid therapy, or sooner if symptoms worsen or she develops and new symptoms. The patient demonstrated a good understanding and agreed with the treatment plan.

## 2017-06-07 ENCOUNTER — HOSPITAL ENCOUNTER (OUTPATIENT)
Dept: LAB | Facility: MEDICAL CENTER | Age: 22
End: 2017-06-07
Attending: PHYSICIAN ASSISTANT
Payer: COMMERCIAL

## 2017-06-07 LAB
ALBUMIN SERPL BCP-MCNC: 4.1 G/DL (ref 3.2–4.9)
ALBUMIN/GLOB SERPL: 1.1 G/DL
ALP SERPL-CCNC: 47 U/L (ref 30–99)
ALT SERPL-CCNC: 13 U/L (ref 2–50)
ANION GAP SERPL CALC-SCNC: 9 MMOL/L (ref 0–11.9)
AST SERPL-CCNC: 16 U/L (ref 12–45)
BASOPHILS # BLD AUTO: 1.2 % (ref 0–1.8)
BASOPHILS # BLD: 0.06 K/UL (ref 0–0.12)
BILIRUB SERPL-MCNC: 0.4 MG/DL (ref 0.1–1.5)
BUN SERPL-MCNC: 12 MG/DL (ref 8–22)
CALCIUM SERPL-MCNC: 9.2 MG/DL (ref 8.5–10.5)
CHLORIDE SERPL-SCNC: 93 MMOL/L (ref 96–112)
CO2 SERPL-SCNC: 33 MMOL/L (ref 20–33)
CREAT SERPL-MCNC: 3.58 MG/DL (ref 0.5–1.4)
EOSINOPHIL # BLD AUTO: 0.04 K/UL (ref 0–0.51)
EOSINOPHIL NFR BLD: 0.8 % (ref 0–6.9)
ERYTHROCYTE [DISTWIDTH] IN BLOOD BY AUTOMATED COUNT: 46.3 FL (ref 35.9–50)
FSH SERPL-ACNC: 2.7 MIU/ML
GFR SERPL CREATININE-BSD FRML MDRD: 16 ML/MIN/1.73 M 2
GLOBULIN SER CALC-MCNC: 3.8 G/DL (ref 1.9–3.5)
GLUCOSE SERPL-MCNC: 88 MG/DL (ref 65–99)
HCT VFR BLD AUTO: 34.9 % (ref 37–47)
HGB BLD-MCNC: 11.2 G/DL (ref 12–16)
IMM GRANULOCYTES # BLD AUTO: 0.01 K/UL (ref 0–0.11)
IMM GRANULOCYTES NFR BLD AUTO: 0.2 % (ref 0–0.9)
LH SERPL-ACNC: 4 IU/L
LYMPHOCYTES # BLD AUTO: 1.14 K/UL (ref 1–4.8)
LYMPHOCYTES NFR BLD: 22 % (ref 22–41)
MCH RBC QN AUTO: 29.2 PG (ref 27–33)
MCHC RBC AUTO-ENTMCNC: 32.1 G/DL (ref 33.6–35)
MCV RBC AUTO: 90.9 FL (ref 81.4–97.8)
MONOCYTES # BLD AUTO: 0.37 K/UL (ref 0–0.85)
MONOCYTES NFR BLD AUTO: 7.1 % (ref 0–13.4)
NEUTROPHILS # BLD AUTO: 3.57 K/UL (ref 2–7.15)
NEUTROPHILS NFR BLD: 68.7 % (ref 44–72)
NRBC # BLD AUTO: 0 K/UL
NRBC BLD AUTO-RTO: 0 /100 WBC
PLATELET # BLD AUTO: 206 K/UL (ref 164–446)
PMV BLD AUTO: 10.8 FL (ref 9–12.9)
POTASSIUM SERPL-SCNC: 3.4 MMOL/L (ref 3.6–5.5)
PROGEST SERPL-MCNC: 15.35 NG/ML
PROT SERPL-MCNC: 7.9 G/DL (ref 6–8.2)
RBC # BLD AUTO: 3.84 M/UL (ref 4.2–5.4)
SODIUM SERPL-SCNC: 135 MMOL/L (ref 135–145)
TSH SERPL DL<=0.005 MIU/L-ACNC: 2.53 UIU/ML (ref 0.3–3.7)
WBC # BLD AUTO: 5.2 K/UL (ref 4.8–10.8)

## 2017-06-07 PROCEDURE — 84443 ASSAY THYROID STIM HORMONE: CPT

## 2017-06-07 PROCEDURE — 83002 ASSAY OF GONADOTROPIN (LH): CPT

## 2017-06-07 PROCEDURE — 83001 ASSAY OF GONADOTROPIN (FSH): CPT

## 2017-06-07 PROCEDURE — 36415 COLL VENOUS BLD VENIPUNCTURE: CPT

## 2017-06-07 PROCEDURE — 80053 COMPREHEN METABOLIC PANEL: CPT

## 2017-06-07 PROCEDURE — 85025 COMPLETE CBC W/AUTO DIFF WBC: CPT

## 2017-06-07 PROCEDURE — 84144 ASSAY OF PROGESTERONE: CPT

## 2017-07-29 ENCOUNTER — OFFICE VISIT (OUTPATIENT)
Dept: URGENT CARE | Facility: PHYSICIAN GROUP | Age: 22
End: 2017-07-29
Payer: COMMERCIAL

## 2017-07-29 ENCOUNTER — HOSPITAL ENCOUNTER (EMERGENCY)
Facility: MEDICAL CENTER | Age: 22
End: 2017-07-29
Attending: EMERGENCY MEDICINE
Payer: COMMERCIAL

## 2017-07-29 VITALS
OXYGEN SATURATION: 99 % | DIASTOLIC BLOOD PRESSURE: 76 MMHG | HEIGHT: 67 IN | BODY MASS INDEX: 23.32 KG/M2 | HEART RATE: 77 BPM | WEIGHT: 148.59 LBS | TEMPERATURE: 98.2 F | SYSTOLIC BLOOD PRESSURE: 110 MMHG | RESPIRATION RATE: 12 BRPM

## 2017-07-29 VITALS
WEIGHT: 149.03 LBS | DIASTOLIC BLOOD PRESSURE: 74 MMHG | OXYGEN SATURATION: 96 % | TEMPERATURE: 97.2 F | RESPIRATION RATE: 16 BRPM | HEART RATE: 76 BPM | BODY MASS INDEX: 23.39 KG/M2 | HEIGHT: 67 IN | SYSTOLIC BLOOD PRESSURE: 114 MMHG

## 2017-07-29 DIAGNOSIS — G43.009 MIGRAINE WITHOUT AURA AND WITHOUT STATUS MIGRAINOSUS, NOT INTRACTABLE: Primary | ICD-10-CM

## 2017-07-29 DIAGNOSIS — R10.10 PAIN OF UPPER ABDOMEN: ICD-10-CM

## 2017-07-29 DIAGNOSIS — R06.02 SHORTNESS OF BREATH: ICD-10-CM

## 2017-07-29 DIAGNOSIS — R42 DIZZINESS: ICD-10-CM

## 2017-07-29 LAB
ALBUMIN SERPL BCP-MCNC: 4.3 G/DL (ref 3.2–4.9)
ALBUMIN/GLOB SERPL: 1.1 G/DL
ALP SERPL-CCNC: 47 U/L (ref 30–99)
ALT SERPL-CCNC: 13 U/L (ref 2–50)
ANION GAP SERPL CALC-SCNC: 9 MMOL/L (ref 0–11.9)
APPEARANCE UR: CLEAR
APPEARANCE UR: CLEAR
AST SERPL-CCNC: 19 U/L (ref 12–45)
BASOPHILS # BLD AUTO: 0.7 % (ref 0–1.8)
BASOPHILS # BLD: 0.03 K/UL (ref 0–0.12)
BILIRUB SERPL-MCNC: 0.4 MG/DL (ref 0.1–1.5)
BILIRUB UR STRIP-MCNC: ABNORMAL MG/DL
BUN SERPL-MCNC: 31 MG/DL (ref 8–22)
CALCIUM SERPL-MCNC: 10.1 MG/DL (ref 8.5–10.5)
CHLORIDE SERPL-SCNC: 103 MMOL/L (ref 96–112)
CO2 SERPL-SCNC: 25 MMOL/L (ref 20–33)
COLOR UR AUTO: ABNORMAL
COLOR UR AUTO: YELLOW
CREAT SERPL-MCNC: 6.76 MG/DL (ref 0.5–1.4)
EOSINOPHIL # BLD AUTO: 0.05 K/UL (ref 0–0.51)
EOSINOPHIL NFR BLD: 1.1 % (ref 0–6.9)
ERYTHROCYTE [DISTWIDTH] IN BLOOD BY AUTOMATED COUNT: 49.2 FL (ref 35.9–50)
GFR SERPL CREATININE-BSD FRML MDRD: 8 ML/MIN/1.73 M 2
GLOBULIN SER CALC-MCNC: 3.8 G/DL (ref 1.9–3.5)
GLUCOSE SERPL-MCNC: 83 MG/DL (ref 65–99)
GLUCOSE UR QL STRIP.AUTO: NEGATIVE MG/DL
GLUCOSE UR STRIP.AUTO-MCNC: ABNORMAL MG/DL
HCG UR QL: NEGATIVE
HCT VFR BLD AUTO: 34.3 % (ref 37–47)
HGB BLD-MCNC: 10.9 G/DL (ref 12–16)
IMM GRANULOCYTES # BLD AUTO: 0.01 K/UL (ref 0–0.11)
IMM GRANULOCYTES NFR BLD AUTO: 0.2 % (ref 0–0.9)
INT CON NEG: NORMAL
INT CON POS: NORMAL
KETONES UR QL STRIP.AUTO: NEGATIVE MG/DL
KETONES UR STRIP.AUTO-MCNC: ABNORMAL MG/DL
LEUKOCYTE ESTERASE UR QL STRIP.AUTO: ABNORMAL
LEUKOCYTE ESTERASE UR QL STRIP.AUTO: NEGATIVE
LIPASE SERPL-CCNC: 26 U/L (ref 11–82)
LYMPHOCYTES # BLD AUTO: 0.95 K/UL (ref 1–4.8)
LYMPHOCYTES NFR BLD: 20.6 % (ref 22–41)
MCH RBC QN AUTO: 29.9 PG (ref 27–33)
MCHC RBC AUTO-ENTMCNC: 31.8 G/DL (ref 33.6–35)
MCV RBC AUTO: 94.2 FL (ref 81.4–97.8)
MONOCYTES # BLD AUTO: 0.3 K/UL (ref 0–0.85)
MONOCYTES NFR BLD AUTO: 6.5 % (ref 0–13.4)
NEUTROPHILS # BLD AUTO: 3.27 K/UL (ref 2–7.15)
NEUTROPHILS NFR BLD: 70.9 % (ref 44–72)
NITRITE UR QL STRIP.AUTO: ABNORMAL
NITRITE UR QL STRIP.AUTO: NEGATIVE
NRBC # BLD AUTO: 0 K/UL
NRBC BLD AUTO-RTO: 0 /100 WBC
PH UR STRIP.AUTO: 8.5 [PH] (ref 5–8)
PH UR STRIP.AUTO: >=9 [PH]
PLATELET # BLD AUTO: 158 K/UL (ref 164–446)
PMV BLD AUTO: 9.9 FL (ref 9–12.9)
POC URINE PREGNANCY TEST: NORMAL
POTASSIUM SERPL-SCNC: 5.8 MMOL/L (ref 3.6–5.5)
PROT SERPL-MCNC: 8.1 G/DL (ref 6–8.2)
PROT UR QL STRIP: 100 MG/DL
PROT UR QL STRIP: 100 MG/DL
RBC # BLD AUTO: 3.64 M/UL (ref 4.2–5.4)
RBC UR QL AUTO: ABNORMAL
RBC UR QL AUTO: NEGATIVE
SODIUM SERPL-SCNC: 137 MMOL/L (ref 135–145)
SP GR UR STRIP.AUTO: 1
SP GR UR: 1.01
UROBILINOGEN UR STRIP-MCNC: 0.2 MG/DL
WBC # BLD AUTO: 4.6 K/UL (ref 4.8–10.8)

## 2017-07-29 PROCEDURE — 700102 HCHG RX REV CODE 250 W/ 637 OVERRIDE(OP): Performed by: EMERGENCY MEDICINE

## 2017-07-29 PROCEDURE — 81002 URINALYSIS NONAUTO W/O SCOPE: CPT

## 2017-07-29 PROCEDURE — A9270 NON-COVERED ITEM OR SERVICE: HCPCS | Performed by: EMERGENCY MEDICINE

## 2017-07-29 PROCEDURE — 81002 URINALYSIS NONAUTO W/O SCOPE: CPT | Performed by: NURSE PRACTITIONER

## 2017-07-29 PROCEDURE — 700105 HCHG RX REV CODE 258: Performed by: EMERGENCY MEDICINE

## 2017-07-29 PROCEDURE — 99213 OFFICE O/P EST LOW 20 MIN: CPT | Performed by: NURSE PRACTITIONER

## 2017-07-29 PROCEDURE — 85025 COMPLETE CBC W/AUTO DIFF WBC: CPT

## 2017-07-29 PROCEDURE — 96375 TX/PRO/DX INJ NEW DRUG ADDON: CPT

## 2017-07-29 PROCEDURE — 81025 URINE PREGNANCY TEST: CPT

## 2017-07-29 PROCEDURE — 93005 ELECTROCARDIOGRAM TRACING: CPT | Performed by: EMERGENCY MEDICINE

## 2017-07-29 PROCEDURE — 81025 URINE PREGNANCY TEST: CPT | Performed by: NURSE PRACTITIONER

## 2017-07-29 PROCEDURE — 80053 COMPREHEN METABOLIC PANEL: CPT

## 2017-07-29 PROCEDURE — 93000 ELECTROCARDIOGRAM COMPLETE: CPT | Performed by: NURSE PRACTITIONER

## 2017-07-29 PROCEDURE — 83690 ASSAY OF LIPASE: CPT

## 2017-07-29 PROCEDURE — 700111 HCHG RX REV CODE 636 W/ 250 OVERRIDE (IP): Performed by: EMERGENCY MEDICINE

## 2017-07-29 PROCEDURE — 96374 THER/PROPH/DIAG INJ IV PUSH: CPT

## 2017-07-29 PROCEDURE — 99284 EMERGENCY DEPT VISIT MOD MDM: CPT

## 2017-07-29 PROCEDURE — 36415 COLL VENOUS BLD VENIPUNCTURE: CPT

## 2017-07-29 RX ORDER — METOCLOPRAMIDE HYDROCHLORIDE 5 MG/ML
10 INJECTION INTRAMUSCULAR; INTRAVENOUS ONCE
Status: COMPLETED | OUTPATIENT
Start: 2017-07-29 | End: 2017-07-29

## 2017-07-29 RX ORDER — DIPHENHYDRAMINE HYDROCHLORIDE 50 MG/ML
12.5 INJECTION INTRAMUSCULAR; INTRAVENOUS ONCE
Status: COMPLETED | OUTPATIENT
Start: 2017-07-29 | End: 2017-07-29

## 2017-07-29 RX ORDER — ACETAMINOPHEN 325 MG/1
650 TABLET ORAL ONCE
Status: COMPLETED | OUTPATIENT
Start: 2017-07-29 | End: 2017-07-29

## 2017-07-29 RX ORDER — SODIUM CHLORIDE 9 MG/ML
500 INJECTION, SOLUTION INTRAVENOUS ONCE
Status: COMPLETED | OUTPATIENT
Start: 2017-07-29 | End: 2017-07-29

## 2017-07-29 RX ADMIN — SODIUM CHLORIDE 500 ML: 9 INJECTION, SOLUTION INTRAVENOUS at 21:50

## 2017-07-29 RX ADMIN — DIPHENHYDRAMINE HYDROCHLORIDE 12.5 MG: 50 INJECTION, SOLUTION INTRAMUSCULAR; INTRAVENOUS at 21:51

## 2017-07-29 RX ADMIN — ACETAMINOPHEN 650 MG: 325 TABLET, FILM COATED ORAL at 21:50

## 2017-07-29 RX ADMIN — METOCLOPRAMIDE 10 MG: 5 INJECTION, SOLUTION INTRAMUSCULAR; INTRAVENOUS at 21:51

## 2017-07-29 ASSESSMENT — ENCOUNTER SYMPTOMS
FOCAL WEAKNESS: 0
BLURRED VISION: 0
NAUSEA: 1
LOSS OF CONSCIOUSNESS: 0
COUGH: 0
SHORTNESS OF BREATH: 0
DIARRHEA: 0
SEIZURES: 0
DOUBLE VISION: 0
DIZZINESS: 0
VOMITING: 1
ABDOMINAL PAIN: 1
BACK PAIN: 1
HEADACHES: 1
TINGLING: 0

## 2017-07-29 ASSESSMENT — PAIN SCALES - GENERAL
PAINLEVEL_OUTOF10: 7
PAINLEVEL_OUTOF10: 8

## 2017-07-29 NOTE — ED AVS SNAPSHOT
Home Care Instructions                                                                                                                Lily Nicole   MRN: [id number]    Department:  [facility], Emergency Dept   Date of Visit:  7/29/2017            [facility] Emergency Dept    [address] [place] [address]    Phone:  [phone]      You were seen by     Curtis Devries II, M.D.      Your Diagnosis Was     Migraine without aura and without status migrainosus, not intractable     G43.009       These are the medications you received during your hospitalization from 07/29/2017 1718 to 07/29/2017 2310     Date/Time Order Dose Route Action    07/29/2017 2150 NS infusion 500 mL 500 mL Intravenous New Bag    07/29/2017 2151 metoclopramide (REGLAN) injection 10 mg 10 mg Intravenous Given    07/29/2017 2151 diphenhydrAMINE (BENADRYL) injection 12.5 mg 12.5 mg Intravenous Given    07/29/2017 2150 acetaminophen (TYLENOL) tablet 650 mg 650 mg Oral Given      Follow-up Information     1. Follow up with MARK Truong.    Specialty:  Family Medicine    Why:  for evaluation of chronic headaches    Contact information    [address] [facility] [address]  [phone]          2. Follow up with [facility] Emergency [facility].    Specialty:  Emergency Medicine    Why:  If symptoms worsen    Contact information    [address] [place] [id number] [phone]        3. Follow up with Go to dialysis on monday.      Medication Information     Review all of your home medications and newly ordered medications with your primary doctor and/or pharmacist as soon as possible. Follow medication instructions as directed by your doctor and/or pharmacist.     Please keep your complete medication list with you and share with your physician. Update the information when medications are discontinued, doses are changed, or new medications (including  over-the-counter products) are added; and carry medication information at all times in the event of emergency situations.               Medication List      ASK your doctor about these medications        Instructions    Morning Afternoon Evening Bedtime    atenolol 25 MG Tabs   Commonly known as:  TENORMIN        Take 25 mg by mouth every bedtime.   Dose:  25 mg                        hydroxychloroquine 200 MG Tabs   Commonly known as:  PLAQUENIL        Take 400 mg by mouth every bedtime.   Dose:  400 mg                        hydrOXYzine 25 MG Tabs   Commonly known as:  ATARAX        Take 1 Tab by mouth 3 times a day as needed for Itching.   Dose:  25 mg                        MYFORTIC 360 MG Tbec tablet   Generic drug:  mycophenolate sodium        Take 360 mg by mouth 2 Times a Day.   Dose:  360 mg                        predniSONE 5 MG Tabs   Commonly known as:  DELTASONE        Take 5 mg by mouth every bedtime.   Dose:  5 mg                                Procedures and tests performed during your visit     CARDIAC MONITORING    CBC WITH DIFFERENTIAL    COMP METABOLIC PANEL    EKG (ER)    ESTIMATED GFR    LIPASE    O2 Protocol    POC UA    POC URINE PREGNANCY    SALINE LOCK        Discharge Instructions       Migraine Headache  A migraine headache is very bad, throbbing pain on one or both sides of your head. Talk to your doctor about what things may bring on (trigger) your migraine headaches.  HOME CARE  · Only take medicines as told by your doctor.  · Lie down in a dark, quiet room when you have a migraine.  · Keep a journal to find out if certain things bring on migraine headaches. For example, write down:  ¨ What you eat and drink.  ¨ How much sleep you get.  ¨ Any change to your diet or medicines.  · Lessen how much alcohol you drink.  · Quit smoking if you smoke.  · Get enough sleep.  · Lessen any stress in your life.  · Keep lights dim if bright lights bother you or make your migraines worse.  GET HELP  RIGHT AWAY IF:   · Your migraine becomes really bad.  · You have a fever.  · You have a stiff neck.  · You have trouble seeing.  · Your muscles are weak, or you lose muscle control.  · You lose your balance or have trouble walking.  · You feel like you will pass out (faint), or you pass out.  · You have really bad symptoms that are different than your first symptoms.  MAKE SURE YOU:   · Understand these instructions.  · Will watch your condition.  · Will get help right away if you are not doing well or get worse.     This information is not intended to replace advice given to you by your health care provider. Make sure you discuss any questions you have with your health care provider.     Document Released: 09/26/2009 Document Revised: 03/11/2013 Document Reviewed: 08/25/2014  [employer] Interactive Patient Education ©2016 [employer]            Patient Information     Patient Information    Following emergency treatment: all patient requiring follow-up care must return either to a private physician or a clinic if your condition worsens before you are able to obtain further medical attention, please return to the emergency room.     Billing Information    At [facility], we work to make the billing process streamlined for our patients.  Our Representatives are here to answer any questions you may have regarding your hospital bill.  If you have insurance coverage and have supplied your insurance information to us, we will submit a claim to your insurer on your behalf.  Should you have any questions regarding your bill, we can be reached online or by phone as follows:  Online: You are able pay your bills online or live chat with our representatives about any billing questions you may have. We are here to help Monday - Friday from 8:00am to 7:30pm and 9:00am - 12:00pm on Saturdays.  Please visit [email or web address]  for more information.   Phone:  [phone] or  [phone]    Please note that your emergency physician, surgeon, pathologist, radiologist, anesthesiologist, and other specialists are not employed by [facility] and will therefore bill separately for their services.  Please contact them directly for any questions concerning their bills at the numbers below:     Emergency Physician Services:  [phone]  [facility]:  [phone]  [facility]:  [phone]  [facility]:  [phone]    1. Your final bill may vary from the amount quoted upon discharge if all procedures are not complete at that time, or if your doctor has additional procedures of which we are not aware. You will receive an additional bill if you return to the Emergency Department at [facility] for suture removal regardless of the facility of which the sutures were placed.     2. Please arrange for settlement of this account at the emergency registration.    3. All self-pay accounts are due in full at the time of treatment.  If you are unable to meet this obligation then payment is expected within 4-5 days.     4. If you have had radiology studies (CT, X-ray, Ultrasound, MRI), you have received a preliminary result during your emergency department visit. Please contact the radiology department [phone] to receive a copy of your final result. Please discuss the Final result with your primary physician or with the follow up physician provided.     Crisis Hotline:  [place] Crisis Hotline:  9-333-HSRWSPB or 1-158.895.4106  [place] Crisis Hotline:    1-676.569.5655 or 123-998-6004         ED Discharge Follow Up Questions    1. In order to provide you with very good care, we would like to follow up with a phone call in the next few days.  May we have your permission to contact you?     YES /  NO    2. What is the best phone number to call you? (       )_____-__________    3. What is the best time to call you?      Morning  /  Afternoon  /   Evening                   Patient Signature:  ____________________________________________________________    Date:  ____________________________________________________________      Your appointments     Aug 03, 2017  3:00 PM   New Patient with Gloria Rick M.D.   [facility] (--)    [address], [address]  [facility] [address] [phone]           Please bring Photo ID, Insurance Cards, All Medication Bottles and copies of any legal documents (such as Living Will, Power of ) If speaking a language besides English please bring an adult . Please arrive 30 minutes prior for check in and registration. You will be receiving a confirmation call a few days before your appointment from our automated call confirmation system.            Aug 14, 2017  3:20 PM   New Patient with Jeana London M.D.   [facility] (--)    [address], [address]  [facility] [id number] [phone]           Please bring Photo ID, Insurance Cards, All Medication Bottles and copies of any legal documents (such as Living Will, Power of ) If speaking a language besides English please bring an adult . Please arrive 30 minutes prior for check in and registration. You will be receiving a confirmation call a few days before your appointment from our automated call confirmation system.

## 2017-07-29 NOTE — PROGRESS NOTES
Chief Complaint   Patient presents with   • Abdominal Pain     Upper Abd pain x 2 hours        HISTORY OF PRESENT ILLNESS: Patient is a 21 y.o. female who presents today due to complaints of abdominal pain. States she was lying down, resting, when she suddenly developed upper abdominal pain two hours ago. She then developed dizziness, while still lying, with one episode of vomiting. This was followed by one hour of right sided upper back pain and sensation of shortness of breath. Her dizziness, back pain and shortness of breath have resolved. The pain in her abdomen has been intermittent since, described as sharp, rated 8/10. She denies chest pain, fever, changes in bowel movements. She is a dialysis patient, last went on Friday, still urinates and denies changes in urination. She has not taken anything for symptom relief. Denies history of same.       Patient Active Problem List    Diagnosis Date Noted   • Arteriovenous fistula, acquired (CMS-HCC) 03/21/2017       Allergies:Review of patient's allergies indicates no known allergies.    Current Outpatient Prescriptions Ordered in [facility]   Medication Sig Dispense Refill   • atenolol (TENORMIN) 25 MG Tab Take 25 mg by mouth every bedtime.     • predniSONE (DELTASONE) 5 MG Tab Take 5 mg by mouth every bedtime.     • hydroxychloroquine (PLAQUENIL) 200 MG Tab Take 400 mg by mouth every bedtime.     • mycophenolate sodium (MYFORTIC) 360 MG Tablet Delayed Response tablet Take 360 mg by mouth 2 Times a Day.     • hydrOXYzine (ATARAX) 25 MG Tab Take 1 Tab by mouth 3 times a day as needed for Itching. 30 Tab 0     No current Epic-ordered facility-administered medications on file.       Past Medical History   Diagnosis Date   • Hypertension    • Renal disorder    • Seizure (CMS-HCC) 2013   • Infectious disease      pt had the flu on 2/20/17   • Dialysis patient (CMS-HCC)      pt gets dialysis M,W,F [facility]       Social History   Substance Use Topics   • Smoking status: Never  "Smoker    • Smokeless tobacco: None   • Alcohol Use: No       No family status information on file.   History reviewed. No pertinent family history.    ROS:  Review of Systems   Constitutional: Negative for fever, chills, weight loss, malaise, and fatigue.   HENT: Negative for ear pain, nosebleeds, congestion, sore throat and neck pain.    Eyes: Negative for vision changes.   Neuro: Positive for dizziness. Negative for headache, sensory changes, weakness, seizure, LOC.   Cardiovascular: Negative for chest pain, palpitations, orthopnea and leg swelling.   Respiratory: Positive for shortness of breath. Negative for cough, sputum production, and wheezing.   Gastrointestinal: Positive for abdominal pain, nausea, vomiting. Negative for diarrhea.   Genitourinary: Negative for dysuria, urgency and frequency.  Musculoskeletal: Positive for back pain. Negative for falls, neck pain, joint pain, myalgias.   Skin: Negative for rash, diaphoresis.     Exam:  Blood pressure 114/74, pulse 76, temperature 36.2 °C (97.2 °F), resp. rate 16, height 1.702 m (5' 7.01\"), weight 67.6 kg (149 lb 0.5 oz), last menstrual period 07/08/2017, SpO2 96 %, not currently breastfeeding.  General: well-nourished, well-developed female in NAD  Head: normocephalic, atraumatic  Eyes: PERRLA, no conjunctival injection, acuity grossly intact, lids normal.  Ears: normal shape and symmetry, gross auditory acuity is intact.  Nose: symmetrical without tenderness, no discharge.  Mouth/Throat: reasonable hygiene, no erythema, exudates or tonsillar enlargement.  Neck: no masses, range of motion within normal limits, no tracheal deviation. No obvious thyroid enlargement.   Lymph: no cervical adenopathy. No supraclavicular adenopathy.   Neuro: alert and oriented. Cranial nerves 1-12 grossly intact. No sensory deficit.   Cardiovascular: regular rate and rhythm. No edema.  Pulmonary: no distress. Chest is symmetrical with respiration, no wheezes, crackles, or " rhonchi.   Abdomen: soft, tender throughout and guarding, no hepatosplenomegaly.  Musculoskeletal: no clubbing, appropriate muscle tone, gait is stable.  Skin: pale, ashen, warm, dry, intact, no clubbing, no cyanosis, no rashes.   Psych: appropriate mood, affect, judgement.         Assessment/Plan:  1. Pain of upper abdomen  POCT Urinalysis    POCT Pregnancy    EKG   2. Dizziness     3. Shortness of breath         EKG Interpretation   Interpreted by me   Rhythm: normal sinus   Rate: normal   Axis: normal   Ectopy: none   Conduction: normal   ST Segments: no acute change   T Waves: no acute change   Q Waves: none   Clinical Impression: no acute changes and normal EKG    Lab Results   Component Value Date/Time    POC COLOR Straw 07/29/2017 04:35 PM    POC APPEARANCE Clear 07/29/2017 04:35 PM    POC LEUKOCYTE ESTERASE Neg 07/29/2017 04:35 PM    POC NITRITES Neg 07/29/2017 04:35 PM    POC UROBILIGEN 0.2 07/29/2017 04:35 PM    POC PROTEIN 100 07/29/2017 04:35 PM    POC URINE PH 8.5* 07/29/2017 04:35 PM    POC BLOOD Neg 07/29/2017 04:35 PM    POC SPECIFIC GRAVITY 1.005 07/29/2017 04:35 PM    POC KETONES Neg 07/29/2017 04:35 PM    POC BILIRUBEN Neg 07/29/2017 04:35 PM    POC GLUCOSE Neg 07/29/2017 04:35 PM      POC preg negative      The patient is a 20 y/o dialysis patient with complaints of sudden sharp upper abdominal pain with associated dizziness, nausea, vomiting and back pain. Due to her complaints and health history, I feel the patient requires a higher level of care including closer monitoring, stat lab work and/or imaging for further evaluation as urgent care can no longer perform diagnostics for the remaining of the day. This has been discussed with the patient and they state agreement and understanding. The patient has yet to decide which ED she will be going to but will be taken immediately by her mother who is here with her today. She is in no acute distress upon departure.           Please note that this  dictation was created using voice recognition software. I have made every reasonable attempt to correct obvious errors, but I expect that there are errors of grammar and possibly content that I did not discover before finalizing the note.      JOSE EDUARDO Prater.

## 2017-07-29 NOTE — ED AVS SNAPSHOT
7/29/2017    Lily Nicole  [address] [place] [address]    Dear Lily:    [facility] wants to ensure your discharge home is safe and you or your loved ones have had all of your questions answered regarding your care after you leave the hospital.    Below is a list of resources and contact information should you have any questions regarding your hospital stay, follow-up instructions, or active medical symptoms.    Questions or Concerns Regarding… Contact   Medical Questions Related to Your Discharge  (7 days a week, 8am-5pm) Contact a Nurse Care Coordinator   [phone]   Medical Questions Not Related to Your Discharge  (24 hours a day / 7 days a week)  Contact the Nurse Health Line   [phone]    Medications or Discharge Instructions Refer to your discharge packet   or contact your [facility] Primary Care Provider   [phone]   Follow-up Appointment(s) Schedule your appointment via [employer]   or contact Scheduling [phone]   Billing Review your statement via [employer]  or contact Billing [phone]   Medical Records Review your records via [employer]   or contact Medical Records [phone]     You may receive a telephone call within two days of discharge. This call is to make certain you understand your discharge instructions and have the opportunity to have any questions answered. You can also easily access your medical information, test results and upcoming appointments via the [employer] free online health management tool. You can learn more and sign up at [employer]. For assistance setting up your [employer] account, please call [phone].    Once again, we want to ensure your discharge home is safe and that you have a clear understanding of any next steps in your care. If you have any questions or concerns, please do not hesitate to contact us, we are here for you. Thank you for choosing [facility] for your healthcare needs.    Sincerely,    Your [facility] Healthcare Team

## 2017-07-29 NOTE — Clinical Note
July 29, 2017         Patient: Lily Nicole   YOB: 1995   Date of Visit: 7/29/2017           To Whom it May Concern:    Lily Nicole was seen in my clinic on 7/29/2017. She may be excused from work today.        If you have any questions or concerns, please don't hesitate to call.        Sincerely,           ERICA Reynolds  Electronically Signed

## 2017-07-29 NOTE — ED AVS SNAPSHOT
Money Dashboard Access Code: [email or web address]  Expires: 8/28/2017 11:09 PM    Your email address is not on file at [employer] [employer].  Email Addresses are required for you to sign up for Money Dashboard, please contact [phone] to verify your personal information and to provide your email address prior to attempting to register for Money Dashboard.    Lily Nicole  [address] [place] [address]    Money Dashboard  A secure, online tool to manage your health information     [employer]’s Money Dashboard® is a secure, online tool that connects you to your personalized health information from the privacy of your home -- day or night - making it very easy for you to manage your healthcare. Once the activation process is completed, you can even access your medical information using the Money Dashboard tg, which is available for free in the Apple Tg store or Google Play store.     To learn more about Money Dashboard, visit [email or web address]    There are two levels of access available (as shown below):   My Chart Features  [facility] Primary Care Doctor [facility]  Specialists [facility]  Urgent  Care Non-[facility] Primary Care Doctor   Email your healthcare team securely and privately 24/7 X X X    Manage appointments: schedule your next appointment; view details of past/upcoming appointments X      Request prescription refills. X      View recent personal medical records, including lab and immunizations X X X X   View health record, including health history, allergies, medications X X X X   Read reports about your outpatient visits, procedures, consult and ER notes X X X X   See your discharge summary, which is a recap of your hospital and/or ER visit that includes your diagnosis, lab results, and care plan X X  X     How to register for sendwithust:  Once your e-mail address has been verified, follow the following steps to sign up for sendwithust.     1. Go to  [email or web address]  2. Click on the Sign Up Now box, which takes you to the New Member Sign Up page. You will  need to provide the following information:  a. Enter your netTALK Access Code exactly as it appears at the top of this page. (You will not need to use this code after you’ve completed the sign-up process. If you do not sign up before the expiration date, you must request a new code.)   b. Enter your date of birth.   c. Enter your home email address.   d. Click Submit, and follow the next screen’s instructions.  3. Create a [employer] ID. This will be your netTALK login ID and cannot be changed, so think of one that is secure and easy to remember.  4. Create a netTALK password. You can change your password at any time.  5. Enter your Password Reset Question and Answer. This can be used at a later time if you forget your password.   6. Enter your e-mail address. This allows you to receive e-mail notifications when new information is available in netTALK.  7. Click Sign Up. You can now view your health information.    For assistance activating your net[employer] account, call [phone]

## 2017-07-30 NOTE — ED NOTES
Chief Complaint   Patient presents with   • Abdominal Pain     c/o diffuse abominal pain x 2 hrs. describes pain as sharp and intermittent. already had gallbladder removed, still has her appendix.   • N/V     n/v. denies diarrhea.     Denies any urinary symptoms.

## 2017-07-30 NOTE — ED PROVIDER NOTES
ED Provider Note    Scribed for CASIE Lopez II* by Tamra Alicea. 7/29/2017  9:17 PM    Means of Arrival: walk-in  History obtained by: patient  Limitations: none    CHIEF COMPLAINT  Chief Complaint   Patient presents with   • Abdominal Pain     c/o diffuse abominal pain x 2 hrs. describes pain as sharp and intermittent. already had gallbladder removed, still has her appendix.   • N/V     n/v. denies diarrhea.       HPI  Lily Nicole is a 21 y.o. female with a history of lupus, renal failure who presents to the ED with diffuse abdominal pain onset around 6:30PM this evening with associated headache, back pain, nausea and vomiting. Ms. Nicole has already had her gallbladder removed but still has her appendix. Patient receives dialysis through a fistula in her right arm with her last appointment being yesterday. She has been on dialysis for 2 years and was required to start secondary to her lupus affecting her kidneys. At this time patient is describing her headache as similar to her regular migraines. She normally does not experience vision changes with her migraines, and normally takes Tylenol and attempts to improve the pain with ice packs. Ms. Resendiz is still producing urine, relieving approximately 3-4x a day.  No complaints of diarrhea, painful urination, vision changes.   She says her abdominal pain feels more like nausea and not actual pain.    REVIEW OF SYSTEMS  Review of Systems   Eyes: Negative for blurred vision and double vision.   Respiratory: Negative for cough and shortness of breath.    Cardiovascular: Negative for chest pain and leg swelling.   Gastrointestinal: Positive for nausea, vomiting and abdominal pain. Negative for diarrhea.   Genitourinary: Negative for dysuria.   Musculoskeletal: Positive for back pain.   Neurological: Positive for headaches. Negative for dizziness, tingling, focal weakness, seizures and loss of consciousness.     See HPI for further details.    PAST MEDICAL  "HISTORY   has a past medical history of Hypertension; Renal disorder; Seizure (CMS-HCC) (2013); Infectious disease; and [place] patient [place]    SOCIAL HISTORY  Social History     Social History Main Topics   • Smoking status: Never Smoker    • Alcohol Use: No   • Drug Use: No       SURGICAL HISTORY   has past surgical history that includes ronak by laparoscopy (4/5/2010); other; other; and av fistula creation.    CURRENT MEDICATIONS  Home Medications     **Home medications have not yet been reviewed for this encounter**          ALLERGIES  No Known Allergies    PHYSICAL EXAM  VITAL SIGNS: /73 mmHg  Pulse 78  Temp(Src) 36.8 °C (98.2 °F) (Temporal)  Resp 18  Ht 1.702 m (5' 7\")  Wt 67.4 kg (148 lb 9.4 oz)  BMI 23.27 kg/m2  SpO2 94%  LMP 07/08/2017      Pulse ox interpretation: I interpret this pulse ox as normal.  Constitutional: Alert in no apparent distress. Pleasant 21-year-old in no distress  HENT: No signs of trauma, Bilateral external ears normal, Nose normal.   Eyes: Pupils are equal and reactive, Conjunctiva normal, Non-icteric.   Neck: Normal range of motion, No tenderness, Supple, No stridor.   Lymphatic: No lymphadenopathy noted.   Cardiovascular: Regular rate and rhythm, no murmurs. Right arm fistula with good thrill.  Thorax & Lungs: Normal breath sounds, No respiratory distress, No wheezing, No chest tenderness.   Abdomen: Bowel sounds normal, Soft, No reproducible tenderness, No masses, No pulsatile masses. No peritoneal signs.   Skin: Warm, Dry, No erythema, No rash.   Back: No bony tenderness, No CVA tenderness.   Extremities: RUE: fistula present with good thrill, Intact distal pulses, No edema, No tenderness, No cyanosis.  Musculoskeletal: Good range of motion in all major joints. No tenderness to palpation or major deformities noted.   Neurologic: Alert , Normal motor function, Normal sensory function, No focal deficits noted.   Psychiatric: Affect normal, Judgment normal, Mood " normal.       DIAGNOSTIC STUDIES / PROCEDURES    EKG  NSR rate 76  normal intervals  T wave flattening in 3 and AVF  No ST elevation  No peaked T waves    LABS    Pertinent Labs & Imaging studies reviewed. (See chart for details)    COURSE & MEDICAL DECISION MAKING  Pertinent Labs & Imaging studies reviewed. (See chart for details)    9:17 PM This is a 21 y.o. female who presents with nausea, vomiting, and abdominal pain and the differential diagnosis includes but is not limited to migraine headache, tension headache, not suspicious for meningitis, a low suspicion for an intraabdominal process. Ordered for urine pregnancy, UA, estimated GFR, CBC, CMP, lipase, and EKG to evaluate. Patient will be treated with 10mg IV Reglan, 12.5mg IV Benadryl and 650mg Tylenol tablet  for her symptoms.     11:04 PM I re-evaluated patient at bedside. She reports an improved headache after medication administration. I informed her that her labs came back and her potassium is borderline high. Levels 5.8. EKG changes. She says she does urinate normally . This has not changed over the past 24 hours. Request she does not eat any high potassium foods. Her next dialysis is on Monday.    The patient will return for worsening symptoms and is stable at the time of discharge. The patient verbalizes understanding and will comply.    DISPOSITION:  Patient will be discharged home in stable condition.    FOLLOW UP:  Anneliese Kerns, [email or web address].[email or web address]  [address] [facility] [address]  [phone]      for evaluation of chronic headaches    [facility], Emergency Dept  [address] [facility] [address] [phone]    If symptoms worsen    Go to dialysis on monday            FINAL IMPRESSION  1. Migraine headache       Tamra PARRA (Gabrielle), am scribing for, and in the presence of, TRAVIS Lopez II.    Electronically signed by: Tamra Melton), 7/29/2017    Curtis PARRA II, M* personally performed the  services described in this documentation, as scribed by Tamra Alicea in my presence, and it is both accurate and complete.    The note accurately reflects work and decisions made by me.  Curtis Devries II  7/29/2017  11:08 PM

## 2017-07-30 NOTE — ED NOTES
Patient dc'd to home w/ family. Ambulatory on dc. A&Ox4. Patient verbalizes understanding of dc instructions and follow up care. Patient denies any questions or concerns upon dc.

## 2017-07-30 NOTE — ED NOTES
Patient states 5 hours of ABD pain that started suddenly. Pain located supraumbilical/midline and worsens with palpation. Pain is rated 9/10 described as sharp/stabbing. Patient states nausea w/ one episode of emesis PTA. Also states bilat. Temporal headache. Hx of Renal Failure w/ hemodialysis MWF.

## 2017-07-30 NOTE — ED NOTES
8827 ekg completed with no old ekg found at this time  Placed patient on heart monitor. Gave her warm blankets x2

## 2017-08-01 DIAGNOSIS — Z01.812 PRE-OPERATIVE LABORATORY EXAMINATION: ICD-10-CM

## 2017-08-01 LAB
ANION GAP SERPL CALC-SCNC: 8 MMOL/L (ref 0–11.9)
BUN SERPL-MCNC: 28 MG/DL (ref 8–22)
CALCIUM SERPL-MCNC: 9.7 MG/DL (ref 8.5–10.5)
CHLORIDE SERPL-SCNC: 105 MMOL/L (ref 96–112)
CO2 SERPL-SCNC: 26 MMOL/L (ref 20–33)
CREAT SERPL-MCNC: 6.63 MG/DL (ref 0.5–1.4)
GFR SERPL CREATININE-BSD FRML MDRD: 8 ML/MIN/1.73 M 2
GLUCOSE SERPL-MCNC: 83 MG/DL (ref 65–99)
POTASSIUM SERPL-SCNC: 5.1 MMOL/L (ref 3.6–5.5)
SODIUM SERPL-SCNC: 139 MMOL/L (ref 135–145)

## 2017-08-01 PROCEDURE — 80048 BASIC METABOLIC PNL TOTAL CA: CPT

## 2017-08-01 PROCEDURE — 36415 COLL VENOUS BLD VENIPUNCTURE: CPT

## 2017-08-02 ENCOUNTER — HOSPITAL ENCOUNTER (OUTPATIENT)
Facility: MEDICAL CENTER | Age: 22
End: 2017-08-02
Attending: SURGERY | Admitting: SURGERY
Payer: COMMERCIAL

## 2017-08-02 ENCOUNTER — APPOINTMENT (OUTPATIENT)
Dept: RADIOLOGY | Facility: MEDICAL CENTER | Age: 22
End: 2017-08-02
Attending: SURGERY
Payer: COMMERCIAL

## 2017-08-02 VITALS
DIASTOLIC BLOOD PRESSURE: 72 MMHG | RESPIRATION RATE: 17 BRPM | SYSTOLIC BLOOD PRESSURE: 116 MMHG | HEIGHT: 67 IN | OXYGEN SATURATION: 97 % | TEMPERATURE: 97 F | BODY MASS INDEX: 23.32 KG/M2 | HEART RATE: 78 BPM | WEIGHT: 148.59 LBS

## 2017-08-02 DIAGNOSIS — N18.1 CHRONIC KIDNEY DISEASE, STAGE I: ICD-10-CM

## 2017-08-02 PROCEDURE — 36902 INTRO CATH DIALYSIS CIRCUIT: CPT

## 2017-08-02 PROCEDURE — 160002 HCHG RECOVERY MINUTES (STAT)

## 2017-08-02 PROCEDURE — 700111 HCHG RX REV CODE 636 W/ 250 OVERRIDE (IP): Performed by: SURGERY

## 2017-08-02 PROCEDURE — 700102 HCHG RX REV CODE 250 W/ 637 OVERRIDE(OP)

## 2017-08-02 PROCEDURE — 700111 HCHG RX REV CODE 636 W/ 250 OVERRIDE (IP)

## 2017-08-02 PROCEDURE — 99153 MOD SED SAME PHYS/QHP EA: CPT

## 2017-08-02 PROCEDURE — A9270 NON-COVERED ITEM OR SERVICE: HCPCS

## 2017-08-02 RX ORDER — MIDAZOLAM HYDROCHLORIDE 1 MG/ML
.5-2 INJECTION INTRAMUSCULAR; INTRAVENOUS PRN
Status: DISCONTINUED | OUTPATIENT
Start: 2017-08-02 | End: 2017-08-02 | Stop reason: HOSPADM

## 2017-08-02 RX ORDER — OXYCODONE HYDROCHLORIDE 5 MG/1
TABLET ORAL
Status: COMPLETED
Start: 2017-08-02 | End: 2017-08-02

## 2017-08-02 RX ORDER — MIDAZOLAM HYDROCHLORIDE 1 MG/ML
INJECTION INTRAMUSCULAR; INTRAVENOUS
Status: COMPLETED
Start: 2017-08-02 | End: 2017-08-02

## 2017-08-02 RX ORDER — SODIUM CHLORIDE 9 MG/ML
500 INJECTION, SOLUTION INTRAVENOUS
Status: DISCONTINUED | OUTPATIENT
Start: 2017-08-02 | End: 2017-08-02 | Stop reason: HOSPADM

## 2017-08-02 RX ORDER — OXYCODONE HYDROCHLORIDE 5 MG/1
2.5 TABLET ORAL
Status: DISCONTINUED | OUTPATIENT
Start: 2017-08-02 | End: 2017-08-02 | Stop reason: HOSPADM

## 2017-08-02 RX ADMIN — MIDAZOLAM 1 MG: 1 INJECTION INTRAMUSCULAR; INTRAVENOUS at 16:55

## 2017-08-02 RX ADMIN — FENTANYL CITRATE 25 MCG: 50 INJECTION, SOLUTION INTRAMUSCULAR; INTRAVENOUS at 17:35

## 2017-08-02 RX ADMIN — OXYCODONE HYDROCHLORIDE 2.5 MG: 5 TABLET ORAL at 18:00

## 2017-08-02 RX ADMIN — FENTANYL CITRATE 50 MCG: 50 INJECTION, SOLUTION INTRAMUSCULAR; INTRAVENOUS at 17:04

## 2017-08-02 RX ADMIN — MIDAZOLAM 1 MG: 1 INJECTION INTRAMUSCULAR; INTRAVENOUS at 17:07

## 2017-08-02 RX ADMIN — FENTANYL CITRATE 50 MCG: 50 INJECTION, SOLUTION INTRAMUSCULAR; INTRAVENOUS at 16:47

## 2017-08-02 RX ADMIN — FENTANYL CITRATE 25 MCG: 50 INJECTION, SOLUTION INTRAMUSCULAR; INTRAVENOUS at 17:07

## 2017-08-02 RX ADMIN — FENTANYL CITRATE 25 MCG: 50 INJECTION, SOLUTION INTRAMUSCULAR; INTRAVENOUS at 17:28

## 2017-08-02 RX ADMIN — MIDAZOLAM 1 MG: 1 INJECTION INTRAMUSCULAR; INTRAVENOUS at 16:47

## 2017-08-02 RX ADMIN — MIDAZOLAM 1 MG: 1 INJECTION INTRAMUSCULAR; INTRAVENOUS at 17:18

## 2017-08-02 ASSESSMENT — PAIN SCALES - GENERAL
PAINLEVEL_OUTOF10: 0

## 2017-08-02 NOTE — IP AVS SNAPSHOT
" Home Care Instructions                                                                                                                Name:Lily Nicole  Medical Record Number:[id number]  CSN: [id number]    YOB: 1995   Age: 21 y.o.  Sex: female  HT:1.702 m (5' 7\") (86 %, Z = 1.06, Source: [facility] 2-20 Years) WT: 67.4 kg (148 lb 9.4 oz) (79 %, Z = 0.81, Source: [facility] 2-20 Years)          Admit Date: 8/2/2017     Discharge Date:   Today's Date: 8/2/2017  Attending Doctor:  Erwin Livingston M.D.                  Allergies:  Review of patient's allergies indicates no known allergies.                Discharge Instructions         ACTIVITY: Rest and take it easy for the first 24 hours.  A responsible adult is recommended to remain with you during that time.  It is normal to feel sleepy.  We encourage you to not do anything that requires balance, judgment or coordination.    MILD FLU-LIKE SYMPTOMS ARE NORMAL. YOU MAY EXPERIENCE GENERALIZED MUSCLE ACHES, THROAT IRRITATION, HEADACHE AND/OR SOME NAUSEA.    FOR 24 HOURS DO NOT:  Drive, operate machinery or run household appliances.  Drink beer or alcoholic beverages.   Make important decisions or sign legal documents.      DIET: To avoid nausea, slowly advance diet as tolerated, avoiding spicy or greasy foods for the first day.  Add more substantial food to your diet according to your physician's instructions.  Babies can be fed formula or breast milk as soon as they are hungry.  INCREASE FLUIDS AND FIBER TO AVOID CONSTIPATION.    SURGICAL DRESSING/BATHING:     Keep the dressing clean and dry for 7 days and /or until next dialysis appointment.  May change dressing if needed.     FOLLOW-UP APPOINTMENT:  A follow-up appointment should be arranged with your doctor; call to schedule.    You should CALL YOUR PHYSICIAN if you develop:  Fever greater than 101 degrees F.  Pain not relieved by medication, or persistent nausea or vomiting.  Excessive bleeding (blood soaking " through dressing) or unexpected drainage from the wound.  Extreme redness or swelling around the incision site, drainage of pus or foul smelling drainage.  Inability to urinate or empty your bladder within 8 hours.  Problems with breathing or chest pain.    You should call 911 if you develop problems with breathing or chest pain.  If you are unable to contact your doctor or surgical center, you should go to the nearest emergency room or urgent care center.      Physician's telephone #:  DEANDRE [phone]    If any questions arise, call your doctor.  If your doctor is not available, please feel free to call the Surgical Center at [phone].  The Center is open Monday through Friday from 7AM to 7PM.  You can also call the HEALTH HOTLINE open 24 hours/day, 7 days/week and speak to a nurse at [phone], or toll free at [phone].    A registered nurse may call you a few days after your surgery to see how you are doing after your procedure.    MEDICATIONS: Resume taking daily medication.  Take prescribed pain medication with food.  If no medication is prescribed, you may take non-aspirin pain medication if needed.  PAIN MEDICATION CAN BE VERY CONSTIPATING.  Take a stool softener or laxative such as senokot, pericolace, or milk of magnesia if needed.      If your physician has prescribed pain medication that includes Acetaminophen (Tylenol), do not take additional Acetaminophen (Tylenol) while taking the prescribed medication.    Depression / Suicide Risk    As you are discharged from this [facility] facility, it is important to learn how to keep safe from harming yourself.    Recognize the warning signs:  · Abrupt changes in personality, positive or negative- including increase in energy   · Giving away possessions  · Change in eating patterns- significant weight changes-  positive or negative  · Change in sleeping patterns- unable to sleep or sleeping all the time   · Unwillingness or inability to  "communicate  · Depression  · Unusual sadness, discouragement and loneliness  · Talk of wanting to die  · Neglect of personal appearance   · Rebelliousness- reckless behavior  · Withdrawal from people/activities they love  · Confusion- inability to concentrate     If you or a loved one observes any of these behaviors or has concerns about self-harm, here's what you can do:  · Talk about it- your feelings and reasons for harming yourself  · Remove any means that you might use to hurt yourself (examples: pills, rope, extension cords, firearm)  · Get professional help from the community (Mental Health, Substance Abuse, psychological counseling)  · Do not be alone:Call your Safe Contact- someone whom you trust who will be there for you.  · Call your local CRISIS HOTLINE 432-4047 or 832-181-2885  · Call your local Children's Mobile Crisis Response Team [place] (624) 111-2009 or www.Jianjian  · Call the toll free National Suicide Prevention Hotlines   · National Suicide Prevention Lifeline 026-806-UJSR (7283)  · National Hope Line Network 800-SUICIDE (062-4327)    AV Fistula, Care After    Refer to this sheet in the next few weeks. These instructions provide you with information on caring for yourself after your procedure. Your caregiver may also give you more specific instructions. Your treatment has been planned according to current medical practices, but problems sometimes occur. Call your caregiver if you have any problems or questions after your procedure.  HOME CARE INSTRUCTIONS   · Do not drive a car or take public transportation alone.  · Do not drink alcohol.  · Only take medicine that has been prescribed by your caregiver.  · Do not sign important papers or make important decisions.  · Have a responsible person with you.  · Ask your caregiver to show you how to check your access at home for a vibration (called a \"thrill\") or for a sound (called a \"bruit\" pronounced brew-ee).  · Your vein will need " time to enlarge and mature so needles can be inserted for dialysis. Follow your caregiver's instructions about what you need to do to make this happen.  · Keep dressings clean and dry.  · Keep the arm elevated above your heart. Use a pillow.  · Rest.  · Use the arm as usual for all activities.  · Have the stitches or tape closures removed in 10 to 14 days, or as directed by your caregiver.  · Do not sleep or lie on the area of the fistula or that arm. This may decrease or stop the blood flow through your fistula.  · Do not allow blood pressures to be taken on this arm.  · Do not allow blood drawing to be done from the graft.  · Do not wear tight clothing around the access site or on the arm.  · Avoid lifting heavy objects with the arm that has the fistula.  · Do not use creams or lotions over the access site.  SEEK MEDICAL CARE IF:   · You have a fever.  · You have swelling around the fistula that gets worse, or you have new pain.  · You have unusual bleeding at the fistula site or from any other area.  · You have pus or other drainage at the fistula site.  · You have skin redness or red streaking on the skin around, above, or below the fistula site.  · Your access site feels warm.  · You have any flu-like symptoms.  SEEK IMMEDIATE MEDICAL CARE IF:   · You have pain, numbness, or an unusual pale skin on the hand or on the side of your fistula.  · You have dizziness or weakness that you have not had before.  · You have shortness of breath.  · You have chest pain.  · Your fistula disconnects or breaks, and there is bleeding that cannot be easily controlled.  Call for local emergency medical help. Do not try to drive yourself to the hospital.  MAKE SURE YOU  · Understand these instructions.  · Will watch your condition.  · Will get help right away if you are not doing well or get worse.     This information is not intended to replace advice given to you by your health care provider. Make sure you discuss any questions  you have with your health care provider.     Document Released: 12/18/2006 Document Revised: 01/08/2016 Document Reviewed: 06/06/2012  On[employer] Player Interactive Patient Education ©2016 [employer]         Medication List      CONTINUE taking these medications        Instructions    Morning Afternoon Evening Bedtime    atenolol 25 MG Tabs   Commonly known as:  TENORMIN        Take 25 mg by mouth every bedtime.   Dose:  25 mg                        hydroxychloroquine 200 MG Tabs   Commonly known as:  PLAQUENIL        Take 200 mg by mouth every bedtime.   Dose:  200 mg                        MYFORTIC 360 MG Tbec tablet   Generic drug:  mycophenolate sodium        Take 360 mg by mouth 2 Times a Day.   Dose:  360 mg                        predniSONE 5 MG Tabs   Commonly known as:  DELTASONE        Take 5 mg by mouth every bedtime.   Dose:  5 mg                                Medication Information     Above and/or attached are the medications your physician expects you to take upon discharge. Review all of your home medications and newly ordered medications with your doctor and/or pharmacist. Follow medication instructions as directed by your doctor and/or pharmacist. Please keep your medication list with you and share with your physician. Update the information when medications are discontinued, doses are changed, or new medications (including over-the-counter products) are added; and carry medication information at all times in the event of emergency situations.        Resources     Quit Smoking / Tobacco Use:    I understand the use of any tobacco products increases my chance of suffering from future heart disease or stroke and could cause other illnesses which may shorten my life. Quitting the use of tobacco products is the single most important thing I can do to improve my health. For further information on smoking / tobacco cessation call a Toll Free Quit Line at 1-518.242.4365 (*National Cancer [place]) or  1-372.631.7367 (American Lung Association) or you can access the web based program at www.lungusa.org.    [place] Tobacco Users Help Line:  (784) 931-8277       Toll Free: 1-812.823.2150  Quit Tobacco Program [facility] Services (056)461-6971    Crisis Hotline:    [place] Crisis Hotline:  3-390-FTLDDQV or 1-420.253.5260    [place] Crisis Hotline:    1-480.209.7253 or 685-754-4375    Discharge Survey:   Thank you for choosing [facility]. We hope we did everything we could to make your hospital stay a pleasant one. You may be receiving a survey and we would appreciate your time and participation in answering the questions. Your input is very valuable to us in our efforts to improve our service to our patients and their families.            Signatures     My signature on this form indicates that:    1. I acknowledge receipt and understanding of these Home Care Instruction.  2. My questions regarding this information have been answered to my satisfaction.  3. I have formulated a plan with my discharge nurse to obtain my prescribed medications for home.    __________________________________      __________________________________                   Patient Signature                                 Guardian/Responsible Adult Signature      __________________________________                 __________       ________                       Nurse Signature                                               Date                 Time

## 2017-08-02 NOTE — IP AVS SNAPSHOT
8/2/2017    Lily Nicole  [address] [place] [address]    Dear Lily:    [facility] wants to ensure your discharge home is safe and you or your loved ones have had all of your questions answered regarding your care after you leave the hospital.    Below is a list of resources and contact information should you have any questions regarding your hospital stay, follow-up instructions, or active medical symptoms.    Questions or Concerns Regarding… Contact   Medical Questions Related to Your Discharge  (7 days a week, 8am-5pm) Contact a Nurse Care Coordinator   [phone]   Medical Questions Not Related to Your Discharge  (24 hours a day / 7 days a week)  Contact the Nurse Health Line   [phone]    Medications or Discharge Instructions Refer to your discharge packet   or contact your [facility] Primary Care Provider   [phone]   Follow-up Appointment(s) Schedule your appointment via ActiveO   or contact Scheduling [phone]   Billing Review your statement via ActiveO  or contact Billing [phone]   Medical Records Review your records via ActiveO   or contact Medical Records [phone]     You may receive a telephone call within two days of discharge. This call is to make certain you understand your discharge instructions and have the opportunity to have any questions answered. You can also easily access your medical information, test results and upcoming appointments via the ActiveO free online health management tool. You can learn more and sign up at Kites/ActiveO. For assistance setting up your ActiveO account, please call [phone].    Once again, we want to ensure your discharge home is safe and that you have a clear understanding of any next steps in your care. If you have any questions or concerns, please do not hesitate to contact us, we are here for you. Thank you for choosing [facility] for your healthcare needs.    Sincerely,    Your [facility] Healthcare Team

## 2017-08-03 ENCOUNTER — OFFICE VISIT (OUTPATIENT)
Dept: RHEUMATOLOGY | Facility: PHYSICIAN GROUP | Age: 22
End: 2017-08-03
Payer: COMMERCIAL

## 2017-08-03 VITALS
HEART RATE: 70 BPM | SYSTOLIC BLOOD PRESSURE: 114 MMHG | DIASTOLIC BLOOD PRESSURE: 80 MMHG | TEMPERATURE: 98.2 F | WEIGHT: 147 LBS | HEIGHT: 66 IN | OXYGEN SATURATION: 98 % | BODY MASS INDEX: 23.63 KG/M2 | RESPIRATION RATE: 12 BRPM

## 2017-08-03 DIAGNOSIS — M32.9 SYSTEMIC LUPUS ERYTHEMATOSUS, UNSPECIFIED SLE TYPE, UNSPECIFIED ORGAN INVOLVEMENT STATUS (HCC): ICD-10-CM

## 2017-08-03 DIAGNOSIS — I77.0 ARTERIOVENOUS FISTULA, ACQUIRED (HCC): ICD-10-CM

## 2017-08-03 DIAGNOSIS — M32.14 LUPUS NEPHRITIS, ISN/RPS CLASS IV (HCC): ICD-10-CM

## 2017-08-03 PROCEDURE — 99244 OFF/OP CNSLTJ NEW/EST MOD 40: CPT | Performed by: INTERNAL MEDICINE

## 2017-08-03 ASSESSMENT — ENCOUNTER SYMPTOMS
CHILLS: 0
FEVER: 0
HEARTBURN: 0
MYALGIAS: 0
COUGH: 0
SENSORY CHANGE: 1
ABDOMINAL PAIN: 0
DOUBLE VISION: 0
PALPITATIONS: 0
HEADACHES: 1
WEAKNESS: 0
BLURRED VISION: 0
CONSTIPATION: 0
POLYDIPSIA: 0
DIARRHEA: 0
DIZZINESS: 0
HEMOPTYSIS: 0
DEPRESSION: 0

## 2017-08-03 NOTE — PROGRESS NOTES
Chief Complaint-SLE    Chief Complaint   Patient presents with   • New Patient     Lily Nicole is a 21 y.o. female here as a new Rheumatology Consult referred by ERICA Rangel for consultation regarding SLE    HPI:     Ms. 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 Last dialysis noted was October 12, 2016 she is on Plaquenil and prednisone.  Ms. Lily Nicole states she was diagnosed with SLE 2011 with initial presenting fatigue, loss of appetite, hair loss.  She could not feel her pulse and then was diagnosed with involvement of her kidneys.  She had a kidney biopsy - class IV  She was on myfortic for 1.5 years and then was lost to follow-up.  Her previous dose of myfortic 360mg q day  She was advised to stop myfortic.  She has been out of care x 2 years and off myfortic for two years.    She denies fatigue except occasional - after dialysis.  Her nephrologist is Dr. Mcconnell.  Dialysis was started around May 2015.    She notes intermittent knee pain.  She use to develop malar rash but has not had a recurrence since dialysis.  She notes 6 months ago she started to have hair loss.  She denies nausea no emesis no diarrhea no sicca symptoms, no chest pain or shortness of breath.    She does have Raynauds for 2 years with the starting dialysis.  She denies mouth ulcers.      Labs from 6/20/2016 shows vitiligo protein IgM, IgG, IgA were negative     Labs from 3/29/2017 showed a C3 of 149 a C4 of 33 and an anti-double-stranded DNA of none detected    Current Medication  Hemodialysis MWF   Still makes urine  Plaquenil 200 mg q day  Prednisone 5 mg q day (on this dose x 1 year on chronic prednisone since diagnosis)    Past medical history of lupus nephritis, history of pancreatitis due to adenovirus history of gallstone pancreatitis status post cholecystectomy; two episodes of shingles.  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     • predniSONE (DELTASONE) 5 MG Tab Take 5 mg by mouth every bedtime.     • hydroxychloroquine (PLAQUENIL) 200 MG Tab Take 200 mg by mouth every bedtime.     • mycophenolate sodium (MYFORTIC) 360 MG Tablet Delayed Response tablet Take 360 mg by mouth 2 Times a Day.       No current facility-administered medications for this visit.     She  has a past medical history of Hypertension; Renal disorder; Infectious disease; Dialysis patient (CMS-HCC); Lupus (CMS-HCC); Seizure (CMS-HCC) (2013); and Hemorrhagic disorder (CMS-HCC).  She  has past surgical history that includes ronak by laparoscopy (4/5/2010); other; other; av fistula creation; and cholecystectomy.  No family history on file.  No family status information on file.     Social History   Substance Use Topics   • Smoking status: Never Smoker    • Smokeless tobacco: Not on file   • Alcohol Use: No     Social History     Social History Narrative         ROS  Constitutional ROS: No unexplained fevers, sweats, or chills  Review of Systems   Constitutional: Negative for fever and chills.   HENT: Negative for tinnitus.         Gets migraines once q 3 months ongoing x 2.5 years   Eyes: Negative for blurred vision and double vision.        No dry eyes   Respiratory: Negative for cough and hemoptysis.    Cardiovascular: Negative for chest pain and palpitations.   Gastrointestinal: Negative for heartburn, abdominal pain, diarrhea and constipation.   Genitourinary: Negative for dysuria, urgency and hematuria.   Musculoskeletal: Negative for myalgias and joint pain.        Other than intermittent knee pain   Skin: Negative for rash.   Neurological: Positive for  "sensory change and headaches. Negative for dizziness and weakness.        Numbness bilateral when she wakes up sometimes   Endo/Heme/Allergies: Negative for polydipsia.   Psychiatric/Behavioral: Negative for depression.   All other systems reviewed and are negative.           Objective:     Blood pressure 114/80, pulse 70, temperature 36.8 °C (98.2 °F), resp. rate 12, height 1.676 m (5' 6\"), weight 66.679 kg (147 lb), last menstrual period 07/31/2017, SpO2 98 %, not currently breastfeeding. Body mass index is 23.74 kg/(m^2).  Physical Exam:    Filed Vitals:    08/03/17 1443   BP: 114/80   Pulse: 70   Temp: 36.8 °C (98.2 °F)   Resp: 12   Height: 1.676 m (5' 6\")   Weight: 66.679 kg (147 lb)   SpO2: 98%    Body mass index is 23.74 kg/(m^2).    General/Constitutional: NAD not diaphoretic, comfortable  Eyes: clear conjunctiva, no scleral icterus, EOMI, PERRL  Ears, Nose, Mouth,Throat: no oral ulcers, good dentition, moist mucous membranes, no discharge from ears bilaterally  Cardiovascular: regular rate and rhythm.  No murmurs, gallops, rubs  Respiratory: normal effort, unlabored respiration.  On auscultation no wheezes, rales, rhonchi.  Clear to auscultation.  GI: soft, NTTP no hepatosplenomegaly, nondistended  Musculoskeletal  Axial:  Cervical : no midline or paraspinal tenderness  Thoracic: no midline or paraspinal tenderness  Lumbar: no midline or paraspinal tenderness  Upper Extremities:  No synovitis of the PIP, DIP, MCP but hands are slightly puffy  Wrists and Elbows have good ROM  Muscle Strength: 5/5 in deltoids, biceps, triceps, bilateral on the left  Right arm has fistula with bruit appreciated  Lower Extremities:  No knee effusion bilateral, No crepitus bilateral  No MTP tenderness bilateral  Gait is normal  Skin: Limited skin exam.  no rashes, no digital ulcerations, no alopecia, no tophi, no skin thickening, no nodules  Neuro: CN II-XII grossly intact, Alert, Oriented x 3, moves all four extremities  Psych: " normal affect, normal mood, judgement appropriate, follows commands, responses are appropritae  Heme/Lymph: no cervical adenopathy        No results found for: QNTTBGOLD  Lab Results   Component Value Date/Time    HEPATITIS B CCORE AB, TOTAL Negative 02/17/2011 06:18 PM    HEPATITIS B CORS AB,IGM Negative 03/29/2010 05:00 PM    HEPATITIS B SURFACE ANTIGEN Negative 03/29/2010 09:19 PM     Lab Results   Component Value Date/Time    HEPATITIS C ANTIBODY Negative 03/29/2010 09:19 PM     Lab Results   Component Value Date/Time    SODIUM 139 08/01/2017 01:10 PM    POTASSIUM 5.1 08/01/2017 01:10 PM    CHLORIDE 105 08/01/2017 01:10 PM    CO2 26 08/01/2017 01:10 PM    GLUCOSE 83 08/01/2017 01:10 PM    BUN 28* 08/01/2017 01:10 PM    CREATININE 6.63* 08/01/2017 01:10 PM      Lab Results   Component Value Date/Time    WBC 4.6* 07/29/2017 05:35 PM    RBC 3.64* 07/29/2017 05:35 PM    HEMOGLOBIN 10.9* 07/29/2017 05:35 PM    HEMATOCRIT 34.3* 07/29/2017 05:35 PM    MCV 94.2 07/29/2017 05:35 PM    MCH 29.9 07/29/2017 05:35 PM    MCHC 31.8* 07/29/2017 05:35 PM    MPV 9.9 07/29/2017 05:35 PM    NEUTROPHILS-POLYS 70.90 07/29/2017 05:35 PM    LYMPHOCYTES 20.60* 07/29/2017 05:35 PM    MONOCYTES 6.50 07/29/2017 05:35 PM    EOSINOPHILS 1.10 07/29/2017 05:35 PM    BASOPHILS 0.70 07/29/2017 05:35 PM    HYPOCHROMIA 1+ 02/18/2011 05:40 AM    ANISOCYTOSIS 2+ 02/18/2011 05:40 AM      Lab Results   Component Value Date/Time    CALCIUM 9.7 08/01/2017 01:10 PM    AST(SGOT) 19 07/29/2017 05:35 PM    ALT(SGPT) 13 07/29/2017 05:35 PM    ALKALINE PHOSPHATASE 47 07/29/2017 05:35 PM    TOTAL BILIRUBIN 0.4 07/29/2017 05:35 PM    ALBUMIN 4.3 07/29/2017 05:35 PM    TOTAL PROTEIN 8.1 07/29/2017 05:35 PM     Lab Results   Component Value Date/Time    ANTINUCLEAR ANTIBODY Detected* 02/17/2011 06:18 PM     Lab Results   Component Value Date/Time    SED RATE HARSHADREN 42* 02/17/2011 06:18 PM    STAT C-REACTIVE PROTEIN 0.1 02/17/2011 06:18 PM     No results found  for: CARYN VVTINTERPAVITHRA  Lab Results   Component Value Date/Time    C3 COMPLEMENT 149.0 03/29/2017 11:34 AM    COMPLEMENT C4 33.0 03/29/2017 11:34 AM     Lab Results   Component Value Date/Time    ANTI-DNA -DS None Detected 03/29/2017 11:34 AM    SMITH ANTIBODIES 23 02/17/2011 06:18 PM     Lab Results   Component Value Date/Time    ANTI-DNA -DS None Detected 03/29/2017 11:34 AM    DBLE STRAND DNA AB TITER 1:5120* 02/17/2011 06:18 PM    ANCA IGG <1:20 02/17/2011 06:18 PM    C3 COMPLEMENT 149.0 03/29/2017 11:34 AM    SJOGREN'S ANTI-SS-A 183* 02/17/2011 06:18 PM    SJOGREN'S ANTI-SS-B 76* 02/17/2011 06:18 PM     Lab Results   Component Value Date/Time    COLOR Yellow 02/17/2011 05:45 PM    SPECIFIC GRAVITY 1.020 02/17/2011 05:45 PM    PH 6.0 02/17/2011 05:45 PM    GLUCOSE Negative 02/17/2011 05:45 PM    KETONES Negative 02/17/2011 05:45 PM    PROTEIN 300* 02/17/2011 05:45 PM     Lab Results   Component Value Date/Time    TOTAL PROTEIN, URINE See Note 02/17/2011 05:45 PM    TOTAL PROTEIN, URINE 1123.0* 02/17/2011 05:45 PM    TOTAL VOLUME N/A 02/17/2011 05:45 PM     No results found for: SSA60, SSA52  No results found for: HBA1C  No results found for: CPKTOTAL  No results found for: G6PD  No results found for: EBXV88OSDX  No results found for: ACESERUM  Lab Results   Component Value Date/Time    25-HYDROXY   VITAMIN D 25 8* 02/17/2011 06:18 PM     No results found for: TSH, FREEDIR  Lab Results   Component Value Date/Time    TSH 2.530 06/07/2017 11:13 AM     No results found for: MICROSOMALA, ANTITHYROGL  No results found for: IGGLYMEABS  No results found for: ANTIMITOCHO, FACTIN  No results found for: IGA, TTRANSIGA, ENDOIGA  No results found for: FLTYPE, CRYSTALSBDF  No results found for: ISTATICAL  No results found for: ISTATCREAT  No results found for: CTELOPEP  No results found for: GBMABG  Lab Results   Component Value Date/Time    PTH, INTACT 103.2* 02/17/2011 06:18 PM          Results for orders placed  during the hospital encounter of 02/15/11   US-RENAL            Assessment and Plan:  Ms. Lily Nicole presents with systemic lupus erythematosus complicated with lupus nephritis class IV on hemodialysis.  Her presenting symptoms included a malar rash and hair loss.  We will need old records of her progress note and biopsy report.  Today she denies serositis or rash.  We will check lupus disease activity labs.      Interestingly in the last 6 months she reports numbness of the upper extremities when she wakes up in the morning.   No tenderness in exam she has good range of motion.  The symptoms are bilateral.  Will consider first xrays.  If needed will consider MRI next.  I doubt EMG will be helpful since occurs mainly in the morning.    1. Lupus nephritis, ISN/RPS class IV (CMS-HCC)  EDUARDO ANTIBODY WITH REFLEX    RHEUMATOID ARTHRITIS FACTOR    CCP    ANTI-DNA (DS)    COMPLEMENT C3    COMPLEMENT C4    URINALYSIS    WESTERGREN SED RATE    CRP QUANTITIVE (NON-CARDIAC)    COMPLEMENT TOTAL (CH50)    DX-CERVICAL SPINE-2 OR 3 VIEWS    DX-THORACIC SPINE-2 VIEWS    ANTI SCL-70 ABS    CENTROMERE AB, IGG    HEP B CORE AB TOTAL    HEP B SURFACE ANTIGEN    TB TEST CELL IMM MEASURE AG (QUANTIFERON)    HEP C VIRUS ANTIBODY    HEPATITIS B SURFACE AB TEST   2. Systemic lupus erythematosus, unspecified SLE type, unspecified organ involvement status (CMS-HCC)  EDUARDO ANTIBODY WITH REFLEX    RHEUMATOID ARTHRITIS FACTOR    CCP    ANTI-DNA (DS)    COMPLEMENT C3    COMPLEMENT C4    URINALYSIS    WESTERGREN SED RATE    CRP QUANTITIVE (NON-CARDIAC)    COMPLEMENT TOTAL (CH50)    DX-CERVICAL SPINE-2 OR 3 VIEWS    DX-THORACIC SPINE-2 VIEWS    ANTI SCL-70 ABS    CENTROMERE AB, IGG    HEP B CORE AB TOTAL    HEP B SURFACE ANTIGEN    TB TEST CELL IMM MEASURE AG (QUANTIFERON)    HEP C VIRUS ANTIBODY    HEPATITIS B SURFACE AB TEST   3. Arteriovenous fistula, acquired (CMS-HCC)         Records requested.  Followup: Return in about 3 weeks (around  8/24/2017). or sooner prn  Patient was seen 60 minutes face-to-face (excluding time for procedures)  of which more than 50% the time was spent counseling the patient regarding  rheumatological conditions and care. Therapy was discussed in detail.  Thank you for this referral.

## 2017-08-03 NOTE — OP REPORT
DATE OF SERVICE:  08/02/2017    PREOPERATIVE DIAGNOSIS:  Stage V kidney disease with right arm brachiobasilic   arteriovenous fistula with stenotic outflow and pressurization.    POSTOPERATIVE DIAGNOSIS:  Stage V kidney disease with right arm brachiobasilic   arteriovenous fistula with stenotic outflow and pressurization.    OPERATIONS:  1.  Sheath placement, distal biceps region and the large diameter area of the   fistula directed medially.  2.  Right arm fistulograms.  3.  Central venograms.  4.  Balloon angioplasty, severely stenotic, cephalic arch with 6 mm Conquest   and 7 mm Saint Francis.  5.  Balloon angioplasty, mid arm AV fistula with 8 mm Saint Francis.  6.  Central venogram with dilatation of the stenotic innominate vein with a 12   mm x 40 mm balloon.    SURGEON:  Erwin Livingston MD    ANESTHESIA:  Moderate conscious sedation and 1% Xylocaine.    DESCRIPTION OF PROCEDURE:  After obtaining informed consent, the patient was   placed supine on the procedure table.  A timeout was performed.  The right   upper extremity was prepped with ChloraPrep and draped sterilely.  With   ultrasound, I identified the narrowed area of the distal aspect of the   fistula.  This area is actually the vein that has never enlarged.  This may be   a proximal radiocephalic AV fistula with the anastomosis below the   antecubital space and the vein becomes large in diameter in the distal biceps   region.  I did a fistulogram and observed the severe stenosis at the cephalic   arch.  A Glidewire was passed through a 6-Belarusian sheath.  A 6 mm x 40 mm   Conquest was dilated to 10 atmospheres.  This showed some improvement.  I   subsequently dilated the mid arm region where there was a stenosis with 8 mm x   40 mm Saint Francis.  This was too small for the central vein, so I used a 12 x 40   Abbott balloon and noticed wasting with the angioplasty.  I next used a 7 mm   Saint Francis across the cephalic arch and maintained inflation for approximately a    minute.  The post-dilatation images showed rapid flow.  There is still a   relative narrowing of the cephalic arch, but it is dramatically better than   the narrowing that was present prior to angioplasty.  The patient's fistula no   longer is pulsatile and seems to have a low outflow resistance, which was put   our goal was.  Her presentation was one of bleeding excessively from the   needle sites.  This should resolve that issue.    CONCLUSION:  Proximal radiocephalic AV fistula with outflow stenosis   successfully dilated.  Note that I chose not to place a stent at this time   hoping we can avoid that as long as possible.  She tolerated the procedure   well.  I wrote her a prescription for Norco 5/325.  She also wanted a school   excuse, which I have provided her.  She will follow up as needed.       ____________________________________     MD TREASURE Medina / MADHU    DD:  08/02/2017 18:09:43  DT:  08/02/2017 18:40:23    D#:  [id number]  Job#:  [id number]

## 2017-08-03 NOTE — MR AVS SNAPSHOT
"        Lily Nicole   8/3/2017 3:00 PM   Office Visit   MRN: [id number]    Department:  Rheumatology [facility]   Dept Phone:  [phone]    Description:  Female : 1995   Provider:  Gloria Rick M.D.           Reason for Visit     New Patient           Allergies as of 8/3/2017     No Known Allergies      You were diagnosed with     Lupus nephritis, ISN/RPS class IV (CMS-HCC)   [7373559]       Systemic lupus erythematosus, unspecified SLE type, unspecified organ involvement status (CMS-HCC)   [0346543]       Arteriovenous fistula, acquired (CMS-HCC)   [447.0.ICD-9-CM]         Vital Signs     Blood Pressure Pulse Temperature Respirations Height Weight    114/80 mmHg 70 36.8 °C (98.2 °F) 12 1.676 m (5' 6\") 66.679 kg (147 lb)    Body Mass Index Oxygen Saturation Last Menstrual Period Breastfeeding? Smoking Status       23.74 kg/m2 98% 2017 No Never Smoker        Basic Information     Date Of Birth Sex Race Ethnicity Preferred Language    1995 Female White  Origin (Yoruba,Mozambican,Scottish,Saudi Arabian, etc) English      Your appointments     Aug 14, 2017  3:20 PM   New Patient with Jeana London M.D.   [facility] (--)    [address], [address]  [facility] [id number] [phone]           Please bring Photo ID, Insurance Cards, All Medication Bottles and copies of any legal documents (such as Living Will, Power of ) If speaking a language besides English please bring an adult . Please arrive 30 minutes prior for check in and registration. You will be receiving a confirmation call a few days before your appointment from our automated call confirmation system.            Sep 07, 2017  2:30 PM   Follow Up Visit with Gloria Rick M.D.   [facility] (--)    [address], [address]  [facility] [id number] [phone]           You will be receiving a confirmation call a few days before your appointment from our automated call confirmation " system.              Problem List              ICD-10-CM Priority Class Noted - Resolved    Arteriovenous fistula, acquired (CMS-[facility]) I77.0   3/21/2017 - Present    Chronic kidney disease, stage I N18.1   8/2/2017 - Present      Health Maintenance        Date Due Completion Dates    IMM HEP B VACCINE (1 of 3 - Primary Series) 1995 ---    IMM HEP A VACCINE (1 of 2 - Standard Series) 11/27/1998 ---    IMM HPV VACCINE (1 of 3 - Female 3 Dose Series) 11/27/2008 ---    IMM VARICELLA (CHICKENPOX) VACCINE (1 of 2 - 2 Dose Adolescent Series) 11/27/2010 ---    IMM MENINGOCOCCAL VACCINE (MCV4) (1 of 1) 11/27/2013 ---    IMM DTaP/Tdap/Td Vaccine (1 - Tdap) 11/27/2016 ---    IMM INFLUENZA (1) 9/1/2017 ---            Current Immunizations     No immunizations on file.      Below and/or attached are the medications your provider expects you to take. Review all of your home medications and newly ordered medications with your provider and/or pharmacist. Follow medication instructions as directed by your provider and/or pharmacist. Please keep your medication list with you and share with your provider. Update the information when medications are discontinued, doses are changed, or new medications (including over-the-counter products) are added; and carry medication information at all times in the event of emergency situations     Allergies:  No Known Allergies          Medications  Valid as of: August 03, 2017 -  3:57 PM    Generic Name Brand Name Tablet Size Instructions for use    Atenolol (Tab) TENORMIN 25 MG Take 25 mg by mouth every bedtime.        Hydroxychloroquine Sulfate (Tab) PLAQUENIL 200 MG Take 200 mg by mouth every bedtime.        Mycophenolate Sodium (Tablet Delayed Response) MYFORTIC 360 MG Take 360 mg by mouth 2 Times a Day.        PredniSONE (Tab) DELTASONE 5 MG Take 5 mg by mouth every bedtime.        .                 Medicines prescribed today were sent to:     [facility] [address] [place] [address], [place] [address]  [facility]    [address] [facility] [address]    Phone: [phone] Fax: [phone]    Open 24 Hours?: No      Medication refill instructions:       If your prescription bottle indicates you have medication refills left, it is not necessary to call your provider’s office. Please contact your pharmacy and they will refill your medication.    If your prescription bottle indicates you do not have any refills left, you may request refills at any time through one of the following ways: The online [employer] system (except Urgent Care), by calling your provider’s office, or by asking your pharmacy to contact your provider’s office with a refill request. Medication refills are processed only during regular business hours and may not be available until the next business day. Your provider may request additional information or to have a follow-up visit with you prior to refilling your medication.   *Please Note: Medication refills are assigned a new Rx number when refilled electronically. Your pharmacy may indicate that no refills were authorized even though a new prescription for the same medication is available at the pharmacy. Please request the medicine by name with the pharmacy before contacting your provider for a refill.        Your To Do List     Future Labs/Procedures Complete By Expires    CRP QUANTITIVE (NON-CARDIAC)  8/15/2017 8/3/2018    EDUARDO ANTIBODY WITH REFLEX  As directed 8/3/2018    ANTI SCL-70 ABS  As directed 8/3/2018    ANTI-DNA (DS)  As directed 8/3/2018    Comments:    W/reflex to titer if positive    CCP  As directed 8/3/2018    CENTROMERE AB, IGG  As directed 8/3/2018    COMPLEMENT C3  As directed 8/3/2018    COMPLEMENT C4  As directed 8/3/2018    COMPLEMENT TOTAL (CH50)  As directed 8/3/2018    DX-CERVICAL SPINE-2 OR 3 VIEWS  As directed 8/3/2018    DX-THORACIC SPINE-2 VIEWS  As directed 8/3/2018    HEP B CORE AB TOTAL  As directed 8/3/2018    HEP B SURFACE ANTIGEN  As directed 8/3/2018     HEP C VIRUS ANTIBODY  As directed 8/3/2018    RHEUMATOID ARTHRITIS FACTOR  As directed 8/3/2018    TB TEST CELL IMM MEASURE AG (QUANTIFERON)  As directed 8/3/2018    URINALYSIS  As directed 8/3/2018    WESTERGREN SED RATE  As directed 8/3/2018         [employer] Access Code: [email or web address]  Expires: 8/28/2017 11:09 PM    [employer]  A secure, online tool to manage your health information     [employer]® is a secure, online tool that connects you to your personalized health information from the privacy of your home -- day or night - making it very easy for you to manage your healthcare. Once the activation process is completed, you can even access your medical information using the [employer] tg, which is available for free in the Apple Tg store or Google Play store.     [employer] provides the following levels of access (as shown below):   My Chart Features   [facility] Doctor [facility]  Specialists [facility]  Urgent  Care Non-[facility]  Primary Care  Doctor   Email your healthcare team securely and privately 24/7 X X X    Manage appointments: schedule your next appointment; view details of past/upcoming appointments X      Request prescription refills. X      View recent personal medical records, including lab and immunizations X X X X   View health record, including health history, allergies, medications X X X X   Read reports about your outpatient visits, procedures, consult and ER notes X X X X   See your discharge summary, which is a recap of your hospital and/or ER visit that includes your diagnosis, lab results, and care plan. X X       How to register for [employer]:  1. Go to  [email or web address].  2. Click on the Sign Up Now box, which takes you to the New Member Sign Up page. You will need to provide the following information:  a. Enter your [employer] Access Code exactly as it appears at the top of this page. (You will not need to use this code after you’ve completed the sign-up process. If you  do not sign up before the expiration date, you must request a new code.)   b. Enter your date of birth.   c. Enter your home email address.   d. Click Submit, and follow the next screen’s instructions.  3. Create a [employer] ID. This will be your [employer] login ID and cannot be changed, so think of one that is secure and easy to remember.  4. Create a [employer] password. You can change your password at any time.  5. Enter your Password Reset Question and Answer. This can be used at a later time if you forget your password.   6. Enter your e-mail address. This allows you to receive e-mail notifications when new information is available in [employer].  7. Click Sign Up. You can now view your health information.    For assistance activating your [employer] account, call [phone]

## 2017-08-03 NOTE — PROGRESS NOTES
IR Nursing Note:  Patient underwent a right arm fistulogram angioplasty by Dr. Livingston. Pt remained hemodynamically stable during procedure.  5-0 prolene placed by Dr. Livingston to obtain hemostasis along with manual pressure. Report called to Ghassan MARTINEZ. Pt transported by [facility] to [facility].

## 2017-08-03 NOTE — Clinical Note
[facility]   [address], [address]  [place] [address]  Phone: [phone]  Fax: [phone]              Encounter Date: 8/3/2017    Dear Dr. Rivera  It was a pleasure seeing your patient, Lily Nicole, on 8/3/2017. Diagnoses of Lupus nephritis, ISN/RPS class IV (CMS-[facility]), Systemic lupus erythematosus, unspecified SLE type, unspecified organ involvement status (CMS-[facility]), and Arteriovenous fistula, acquired (CMS-HCC) were pertinent to this visit.     Please find attached progress note which includes the history I obtained from Ms. Martín Nicole, my physical examination findings, my impression and recommendations.      Once again, it was a pleasure participating in your patient's care.  Please feel free to contact me if you have any questions or if I can be of any further assistance to your patients.      Sincerely,    Gloria Rick M.D.  Electronically Signed          PROGRESS NOTE:  Chief Complaint-SLE    Chief Complaint   Patient presents with   • New Patient     Lily Nicole is a 21 y.o. female here as a new Rheumatology Consult referred by ERICA Rangel for consultation regarding SLE    HPI:     Ms. 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 Last dialysis noted was October 12, 2016 she is on Plaquenil and prednisone.  Ms. Lily Nicole states she was diagnosed with SLE 2011 with initial presenting fatigue, loss of appetite, hair loss.  She could not feel her pulse and then was diagnosed with involvement of her kidneys.  She had a kidney biopsy - class IV  She was on myfortic for 1.5 years and then was lost to follow-up.  Her previous dose of myfortic 360mg q day  She was advised to stop myfortic.  She has been out of care x 2 years and off myfortic for two years.    She denies fatigue except occasional - after dialysis.  Her nephrologist is Dr. Mcconnell.  Dialysis was started around May  2015.    She notes intermittent knee pain.  She use to develop malar rash but has not had a recurrence since dialysis.  She notes 6 months ago she started to have hair loss.  She denies nausea no emesis no diarrhea no sicca symptoms, no chest pain or shortness of breath.    She does have Raynauds for 2 years with the starting dialysis.  She denies mouth ulcers.      Labs from 6/20/2016 shows vitiligo protein IgM, IgG, IgA were negative     Labs from 3/29/2017 showed a C3 of 149 a C4 of 33 and an anti-double-stranded DNA of none detected    Current Medication  Hemodialysis MWF   Still makes urine  Plaquenil 200 mg q day  Prednisone 5 mg q day (on this dose x 1 year on chronic prednisone since diagnosis)    Past medical history of lupus nephritis, history of pancreatitis due to adenovirus history of gallstone pancreatitis status post cholecystectomy; two episodes of shingles.  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     • predniSONE (DELTASONE) 5 MG Tab Take 5 mg by mouth every bedtime.     • hydroxychloroquine (PLAQUENIL) 200 MG Tab Take 200 mg by mouth every bedtime.     • mycophenolate sodium (MYFORTIC) 360 MG Tablet Delayed Response tablet Take 360 mg by mouth 2 Times a Day.       No current facility-administered medications for this visit.     She  has a past medical history of Hypertension; Renal disorder; Infectious disease; Dialysis patient (CMS-HCC); Lupus (CMS-HCC); Seizure (CMS-HCC) (2013); and Hemorrhagic disorder (CMS-HCC).  She  has past surgical history that includes ronak by laparoscopy (4/5/2010); other; other; av fistula creation; and cholecystectomy.  No family history on  "file.  No family status information on file.     Social History   Substance Use Topics   • Smoking status: Never Smoker    • Smokeless tobacco: Not on file   • Alcohol Use: No     Social History     Social History Narrative         ROS  Constitutional ROS: No unexplained fevers, sweats, or chills  Review of Systems   Constitutional: Negative for fever and chills.   HENT: Negative for tinnitus.         Gets migraines once q 3 months ongoing x 2.5 years   Eyes: Negative for blurred vision and double vision.        No dry eyes   Respiratory: Negative for cough and hemoptysis.    Cardiovascular: Negative for chest pain and palpitations.   Gastrointestinal: Negative for heartburn, abdominal pain, diarrhea and constipation.   Genitourinary: Negative for dysuria, urgency and hematuria.   Musculoskeletal: Negative for myalgias and joint pain.        Other than intermittent knee pain   Skin: Negative for rash.   Neurological: Positive for sensory change and headaches. Negative for dizziness and weakness.        Numbness bilateral when she wakes up sometimes   Endo/Heme/Allergies: Negative for polydipsia.   Psychiatric/Behavioral: Negative for depression.   All other systems reviewed and are negative.           Objective:     Blood pressure 114/80, pulse 70, temperature 36.8 °C (98.2 °F), resp. rate 12, height 1.676 m (5' 6\"), weight 66.679 kg (147 lb), last menstrual period 07/31/2017, SpO2 98 %, not currently breastfeeding. Body mass index is 23.74 kg/(m^2).  Physical Exam:    Filed Vitals:    08/03/17 1443   BP: 114/80   Pulse: 70   Temp: 36.8 °C (98.2 °F)   Resp: 12   Height: 1.676 m (5' 6\")   Weight: 66.679 kg (147 lb)   SpO2: 98%    Body mass index is 23.74 kg/(m^2).    General/Constitutional: NAD not diaphoretic, comfortable  Eyes: clear conjunctiva, no scleral icterus, EOMI, PERRL  Ears, Nose, Mouth,Throat: no oral ulcers, good dentition, moist mucous membranes, no discharge from ears bilaterally  Cardiovascular: " regular rate and rhythm.  No murmurs, gallops, rubs  Respiratory: normal effort, unlabored respiration.  On auscultation no wheezes, rales, rhonchi.  Clear to auscultation.  GI: soft, NTTP no hepatosplenomegaly, nondistended  Musculoskeletal  Axial:  Cervical : no midline or paraspinal tenderness  Thoracic: no midline or paraspinal tenderness  Lumbar: no midline or paraspinal tenderness  Upper Extremities:  No synovitis of the PIP, DIP, MCP but hands are slightly puffy  Wrists and Elbows have good ROM  Muscle Strength: 5/5 in deltoids, biceps, triceps, bilateral on the left  Right arm has fistula with bruit appreciated  Lower Extremities:  No knee effusion bilateral, No crepitus bilateral  No MTP tenderness bilateral  Gait is normal  Skin: Limited skin exam.  no rashes, no digital ulcerations, no alopecia, no tophi, no skin thickening, no nodules  Neuro: CN II-XII grossly intact, Alert, Oriented x 3, moves all four extremities  Psych: normal affect, normal mood, judgement appropriate, follows commands, responses are appropritae  Heme/Lymph: no cervical adenopathy        No results found for: QNTTBGOLD  Lab Results   Component Value Date/Time    HEPATITIS B CCORE AB, TOTAL Negative 02/17/2011 06:18 PM    HEPATITIS B CORS AB,IGM Negative 03/29/2010 05:00 PM    HEPATITIS B SURFACE ANTIGEN Negative 03/29/2010 09:19 PM     Lab Results   Component Value Date/Time    HEPATITIS C ANTIBODY Negative 03/29/2010 09:19 PM     Lab Results   Component Value Date/Time    SODIUM 139 08/01/2017 01:10 PM    POTASSIUM 5.1 08/01/2017 01:10 PM    CHLORIDE 105 08/01/2017 01:10 PM    CO2 26 08/01/2017 01:10 PM    GLUCOSE 83 08/01/2017 01:10 PM    BUN 28* 08/01/2017 01:10 PM    CREATININE 6.63* 08/01/2017 01:10 PM      Lab Results   Component Value Date/Time    WBC 4.6* 07/29/2017 05:35 PM    RBC 3.64* 07/29/2017 05:35 PM    HEMOGLOBIN 10.9* 07/29/2017 05:35 PM    HEMATOCRIT 34.3* 07/29/2017 05:35 PM    MCV 94.2 07/29/2017 05:35 PM    MCH  29.9 07/29/2017 05:35 PM    MCHC 31.8* 07/29/2017 05:35 PM    MPV 9.9 07/29/2017 05:35 PM    NEUTROPHILS-POLYS 70.90 07/29/2017 05:35 PM    LYMPHOCYTES 20.60* 07/29/2017 05:35 PM    MONOCYTES 6.50 07/29/2017 05:35 PM    EOSINOPHILS 1.10 07/29/2017 05:35 PM    BASOPHILS 0.70 07/29/2017 05:35 PM    HYPOCHROMIA 1+ 02/18/2011 05:40 AM    ANISOCYTOSIS 2+ 02/18/2011 05:40 AM      Lab Results   Component Value Date/Time    CALCIUM 9.7 08/01/2017 01:10 PM    AST(SGOT) 19 07/29/2017 05:35 PM    ALT(SGPT) 13 07/29/2017 05:35 PM    ALKALINE PHOSPHATASE 47 07/29/2017 05:35 PM    TOTAL BILIRUBIN 0.4 07/29/2017 05:35 PM    ALBUMIN 4.3 07/29/2017 05:35 PM    TOTAL PROTEIN 8.1 07/29/2017 05:35 PM     Lab Results   Component Value Date/Time    ANTINUCLEAR ANTIBODY Detected* 02/17/2011 06:18 PM     Lab Results   Component Value Date/Time    SED RATE WESTERGREN 42* 02/17/2011 06:18 PM    STAT C-REACTIVE PROTEIN 0.1 02/17/2011 06:18 PM     No results found for: KENNA RUBIN  Lab Results   Component Value Date/Time    C3 COMPLEMENT 149.0 03/29/2017 11:34 AM    COMPLEMENT C4 33.0 03/29/2017 11:34 AM     Lab Results   Component Value Date/Time    ANTI-DNA -DS None Detected 03/29/2017 11:34 AM    SMITH ANTIBODIES 23 02/17/2011 06:18 PM     Lab Results   Component Value Date/Time    ANTI-DNA -DS None Detected 03/29/2017 11:34 AM    DBLE STRAND DNA AB TITER 1:5120* 02/17/2011 06:18 PM    ANCA IGG <1:20 02/17/2011 06:18 PM    C3 COMPLEMENT 149.0 03/29/2017 11:34 AM    SJOGREN'S ANTI-SS-A 183* 02/17/2011 06:18 PM    SJOGREN'S ANTI-SS-B 76* 02/17/2011 06:18 PM     Lab Results   Component Value Date/Time    COLOR Yellow 02/17/2011 05:45 PM    SPECIFIC GRAVITY 1.020 02/17/2011 05:45 PM    PH 6.0 02/17/2011 05:45 PM    GLUCOSE Negative 02/17/2011 05:45 PM    KETONES Negative 02/17/2011 05:45 PM    PROTEIN 300* 02/17/2011 05:45 PM     Lab Results   Component Value Date/Time    TOTAL PROTEIN, URINE See Note 02/17/2011 05:45 PM    TOTAL  PROTEIN, URINE 1123.0* 02/17/2011 05:45 PM    TOTAL VOLUME N/A 02/17/2011 05:45 PM     No results found for: SSA60, SSA52  No results found for: HBA1C  No results found for: CPKTOTAL  No results found for: G6PD  No results found for: [id number]  No results found for: ACESERUM  Lab Results   Component Value Date/Time    25-HYDROXY   VITAMIN D 25 8* 02/17/2011 06:18 PM     No results found for: TSH, FREEDIR  Lab Results   Component Value Date/Time    TSH 2.530 06/07/2017 11:13 AM     No results found for: MICROSOMALA, ANTITHYROGL  No results found for: IGGLYMEABS  No results found for: ANTIMITOCHO, FACTIN  No results found for: IGA, TTRANSIGA, ENDOIGA  No results found for: FLTYPE, CRYSTALSBDF  No results found for: ISTATICAL  No results found for: ISTATCREAT  No results found for: CTELOPEP  No results found for: GBMABG  Lab Results   Component Value Date/Time    PTH, INTACT 103.2* 02/17/2011 06:18 PM          Results for orders placed during the hospital encounter of 02/15/11   US-RENAL            Assessment and Plan:  Ms. Lily Nicole presents with systemic lupus erythematosus complicated with lupus nephritis class IV on hemodialysis.  Her presenting symptoms included a malar rash and hair loss.  We will need old records of her progress note and biopsy report.  Today she denies serositis or rash.  We will check lupus disease activity labs.      Interestingly in the last 6 months she reports numbness of the upper extremities when she wakes up in the morning.   No tenderness in exam she has good range of motion.  The symptoms are bilateral.  Will consider first xrays.  If needed will consider MRI next.  I doubt EMG will be helpful since occurs mainly in the morning.    1. Lupus nephritis, ISN/RPS class IV (CMS-HCC)  EDUARDO ANTIBODY WITH REFLEX    RHEUMATOID ARTHRITIS FACTOR    CCP    ANTI-DNA (DS)    COMPLEMENT C3    COMPLEMENT C4    URINALYSIS    WESTERGREN SED RATE    CRP QUANTITIVE (NON-CARDIAC)    COMPLEMENT  TOTAL (CH50)    DX-CERVICAL SPINE-2 OR 3 VIEWS    DX-THORACIC SPINE-2 VIEWS    ANTI SCL-70 ABS    CENTROMERE AB, IGG    HEP B CORE AB TOTAL    HEP B SURFACE ANTIGEN    TB TEST CELL IMM MEASURE AG (QUANTIFERON)    HEP C VIRUS ANTIBODY    HEPATITIS B SURFACE AB TEST   2. Systemic lupus erythematosus, unspecified SLE type, unspecified organ involvement status (CMS-HCC)  EDUARDO ANTIBODY WITH REFLEX    RHEUMATOID ARTHRITIS FACTOR    CCP    ANTI-DNA (DS)    COMPLEMENT C3    COMPLEMENT C4    URINALYSIS    WESTERGREN SED RATE    CRP QUANTITIVE (NON-CARDIAC)    COMPLEMENT TOTAL (CH50)    DX-CERVICAL SPINE-2 OR 3 VIEWS    DX-THORACIC SPINE-2 VIEWS    ANTI SCL-70 ABS    CENTROMERE AB, IGG    HEP B CORE AB TOTAL    HEP B SURFACE ANTIGEN    TB TEST CELL IMM MEASURE AG (QUANTIFERON)    HEP C VIRUS ANTIBODY    HEPATITIS B SURFACE AB TEST   3. Arteriovenous fistula, acquired (CMS-HCC)         Records requested.  Followup: Return in about 3 weeks (around 8/24/2017). or sooner prn  Patient was seen 60 minutes face-to-face (excluding time for procedures)  of which more than 50% the time was spent counseling the patient regarding  rheumatological conditions and care. Therapy was discussed in detail.  Thank you for this referral.

## 2017-08-03 NOTE — DISCHARGE INSTRUCTIONS
ACTIVITY: Rest and take it easy for the first 24 hours.  A responsible adult is recommended to remain with you during that time.  It is normal to feel sleepy.  We encourage you to not do anything that requires balance, judgment or coordination.    MILD FLU-LIKE SYMPTOMS ARE NORMAL. YOU MAY EXPERIENCE GENERALIZED MUSCLE ACHES, THROAT IRRITATION, HEADACHE AND/OR SOME NAUSEA.    FOR 24 HOURS DO NOT:  Drive, operate machinery or run household appliances.  Drink beer or alcoholic beverages.   Make important decisions or sign legal documents.      DIET: To avoid nausea, slowly advance diet as tolerated, avoiding spicy or greasy foods for the first day.  Add more substantial food to your diet according to your physician's instructions.  Babies can be fed formula or breast milk as soon as they are hungry.  INCREASE FLUIDS AND FIBER TO AVOID CONSTIPATION.    SURGICAL DRESSING/BATHING:     Keep the dressing clean and dry for 7 days and /or until next dialysis appointment.  May change dressing if needed.     FOLLOW-UP APPOINTMENT:  A follow-up appointment should be arranged with your doctor; call to schedule.    You should CALL YOUR PHYSICIAN if you develop:  Fever greater than 101 degrees F.  Pain not relieved by medication, or persistent nausea or vomiting.  Excessive bleeding (blood soaking through dressing) or unexpected drainage from the wound.  Extreme redness or swelling around the incision site, drainage of pus or foul smelling drainage.  Inability to urinate or empty your bladder within 8 hours.  Problems with breathing or chest pain.    You should call 911 if you develop problems with breathing or chest pain.  If you are unable to contact your doctor or surgical center, you should go to the nearest emergency room or urgent care center.      Physician's telephone #:  CARRERA [phone]    If any questions arise, call your doctor.  If your doctor is not available, please feel free to call the Surgical Center at  [phone].  The Center is open Monday through Friday from 7AM to 7PM.  You can also call the HEALTH HOTLINE open 24 hours/day, 7 days/week and speak to a nurse at [phone], or toll free at [phone].    A registered nurse may call you a few days after your surgery to see how you are doing after your procedure.    MEDICATIONS: Resume taking daily medication.  Take prescribed pain medication with food.  If no medication is prescribed, you may take non-aspirin pain medication if needed.  PAIN MEDICATION CAN BE VERY CONSTIPATING.  Take a stool softener or laxative such as senokot, pericolace, or milk of magnesia if needed.      If your physician has prescribed pain medication that includes Acetaminophen (Tylenol), do not take additional Acetaminophen (Tylenol) while taking the prescribed medication.    Depression / Suicide Risk    As you are discharged from this [facility] facility, it is important to learn how to keep safe from harming yourself.    Recognize the warning signs:  · Abrupt changes in personality, positive or negative- including increase in energy   · Giving away possessions  · Change in eating patterns- significant weight changes-  positive or negative  · Change in sleeping patterns- unable to sleep or sleeping all the time   · Unwillingness or inability to communicate  · Depression  · Unusual sadness, discouragement and loneliness  · Talk of wanting to die  · Neglect of personal appearance   · Rebelliousness- reckless behavior  · Withdrawal from people/activities they love  · Confusion- inability to concentrate     If you or a loved one observes any of these behaviors or has concerns about self-harm, here's what you can do:  · Talk about it- your feelings and reasons for harming yourself  · Remove any means that you might use to hurt yourself (examples: pills, rope, extension cords, firearm)  · Get professional help from the community (Mental Health, Substance Abuse, psychological  "counseling)  · Do not be alone:Call your Safe Contact- someone whom you trust who will be there for you.  · Call your local CRISIS HOTLINE 265-5338 or 308-667-7410  · Call your local Children's Mobile Crisis Response Team [place] (653) 087-5651 or www.Comuto  · Call the toll free National Suicide Prevention Hotlines   · National Suicide Prevention Lifeline 545-843-LLUO (5959)  · National Hope Line Network 800-SUICIDE (405-6772)    AV Fistula, Care After    Refer to this sheet in the next few weeks. These instructions provide you with information on caring for yourself after your procedure. Your caregiver may also give you more specific instructions. Your treatment has been planned according to current medical practices, but problems sometimes occur. Call your caregiver if you have any problems or questions after your procedure.  HOME CARE INSTRUCTIONS   · Do not drive a car or take public transportation alone.  · Do not drink alcohol.  · Only take medicine that has been prescribed by your caregiver.  · Do not sign important papers or make important decisions.  · Have a responsible person with you.  · Ask your caregiver to show you how to check your access at home for a vibration (called a \"thrill\") or for a sound (called a \"bruit\" pronounced brew-ee).  · Your vein will need time to enlarge and mature so needles can be inserted for dialysis. Follow your caregiver's instructions about what you need to do to make this happen.  · Keep dressings clean and dry.  · Keep the arm elevated above your heart. Use a pillow.  · Rest.  · Use the arm as usual for all activities.  · Have the stitches or tape closures removed in 10 to 14 days, or as directed by your caregiver.  · Do not sleep or lie on the area of the fistula or that arm. This may decrease or stop the blood flow through your fistula.  · Do not allow blood pressures to be taken on this arm.  · Do not allow blood drawing to be done from the graft.  · Do " not wear tight clothing around the access site or on the arm.  · Avoid lifting heavy objects with the arm that has the fistula.  · Do not use creams or lotions over the access site.  SEEK MEDICAL CARE IF:   · You have a fever.  · You have swelling around the fistula that gets worse, or you have new pain.  · You have unusual bleeding at the fistula site or from any other area.  · You have pus or other drainage at the fistula site.  · You have skin redness or red streaking on the skin around, above, or below the fistula site.  · Your access site feels warm.  · You have any flu-like symptoms.  SEEK IMMEDIATE MEDICAL CARE IF:   · You have pain, numbness, or an unusual pale skin on the hand or on the side of your fistula.  · You have dizziness or weakness that you have not had before.  · You have shortness of breath.  · You have chest pain.  · Your fistula disconnects or breaks, and there is bleeding that cannot be easily controlled.  Call for local emergency medical help. Do not try to drive yourself to the hospital.  MAKE SURE YOU  · Understand these instructions.  · Will watch your condition.  · Will get help right away if you are not doing well or get worse.     This information is not intended to replace advice given to you by your health care provider. Make sure you discuss any questions you have with your health care provider.     Document Released: 12/18/2006 Document Revised: 01/08/2016 Document Reviewed: 06/06/2012  [employer] Interactive Patient Education ©2016 [employer]

## 2017-08-03 NOTE — OR NURSING
1754 patient arrived from IR s/p right arm fistulogram angioplasty, patient aaox4, c/o right arm pain 6/10, dressing right upper arm clean dry intact, plan of care discussed with patient and family agreeable.   1800 pain meds given per orders.  1815 discharge instructions given to patient by Ghassan MARTINEZ, patient and family verbalized understanding of the orders, currently vss,   1830 iv discontinued tip intact.  1840 patient escorted via w/c with all her personal belongings. Prior to dc vss, rt upper arm dressing clean, no c/o cp, s.o.b

## 2017-08-10 ENCOUNTER — HOSPITAL ENCOUNTER (OUTPATIENT)
Dept: LAB | Facility: MEDICAL CENTER | Age: 22
End: 2017-08-10
Attending: INTERNAL MEDICINE
Payer: COMMERCIAL

## 2017-08-10 DIAGNOSIS — M32.14 LUPUS NEPHRITIS, ISN/RPS CLASS IV (HCC): ICD-10-CM

## 2017-08-10 DIAGNOSIS — M32.9 SYSTEMIC LUPUS ERYTHEMATOSUS, UNSPECIFIED SLE TYPE, UNSPECIFIED ORGAN INVOLVEMENT STATUS (HCC): ICD-10-CM

## 2017-08-10 LAB
APPEARANCE UR: CLEAR
BACTERIA #/AREA URNS HPF: NEGATIVE /HPF
BILIRUB UR QL STRIP.AUTO: NEGATIVE
C3 SERPL-MCNC: 124 MG/DL (ref 87–200)
C4 SERPL-MCNC: 31 MG/DL (ref 19–52)
COLOR UR: YELLOW
CRP SERPL HS-MCNC: 0.47 MG/DL (ref 0–0.75)
EPI CELLS #/AREA URNS HPF: ABNORMAL /HPF
ERYTHROCYTE [SEDIMENTATION RATE] IN BLOOD BY WESTERGREN METHOD: 39 MM/HOUR (ref 0–20)
GLUCOSE UR STRIP.AUTO-MCNC: NEGATIVE MG/DL
HBV CORE AB SERPL QL IA: NEGATIVE
HBV SURFACE AG SER QL: NEGATIVE
HCV AB SER QL: NEGATIVE
HYALINE CASTS #/AREA URNS LPF: ABNORMAL /LPF
KETONES UR STRIP.AUTO-MCNC: NEGATIVE MG/DL
LEUKOCYTE ESTERASE UR QL STRIP.AUTO: NEGATIVE
MICRO URNS: ABNORMAL
NITRITE UR QL STRIP.AUTO: NEGATIVE
PH UR STRIP.AUTO: 8.5 [PH]
PROT UR QL STRIP: 100 MG/DL
RBC # URNS HPF: ABNORMAL /HPF
RBC UR QL AUTO: NEGATIVE
RHEUMATOID FACT SER IA-ACNC: <10 IU/ML (ref 0–14)
SP GR UR STRIP.AUTO: 1.01
UROBILINOGEN UR STRIP.AUTO-MCNC: 0.2 MG/DL
WBC #/AREA URNS HPF: ABNORMAL /HPF

## 2017-08-10 PROCEDURE — 81001 URINALYSIS AUTO W/SCOPE: CPT

## 2017-08-10 PROCEDURE — 86704 HEP B CORE ANTIBODY TOTAL: CPT

## 2017-08-10 PROCEDURE — 83516 IMMUNOASSAY NONANTIBODY: CPT

## 2017-08-10 PROCEDURE — 86235 NUCLEAR ANTIGEN ANTIBODY: CPT | Mod: 91

## 2017-08-10 PROCEDURE — 87340 HEPATITIS B SURFACE AG IA: CPT

## 2017-08-10 PROCEDURE — 86160 COMPLEMENT ANTIGEN: CPT

## 2017-08-10 PROCEDURE — 85652 RBC SED RATE AUTOMATED: CPT

## 2017-08-10 PROCEDURE — 86480 TB TEST CELL IMMUN MEASURE: CPT

## 2017-08-10 PROCEDURE — 86162 COMPLEMENT TOTAL (CH50): CPT

## 2017-08-10 PROCEDURE — 36415 COLL VENOUS BLD VENIPUNCTURE: CPT

## 2017-08-10 PROCEDURE — 86038 ANTINUCLEAR ANTIBODIES: CPT

## 2017-08-10 PROCEDURE — 86225 DNA ANTIBODY NATIVE: CPT | Mod: 91

## 2017-08-10 PROCEDURE — 86431 RHEUMATOID FACTOR QUANT: CPT

## 2017-08-10 PROCEDURE — 86803 HEPATITIS C AB TEST: CPT

## 2017-08-10 PROCEDURE — 86140 C-REACTIVE PROTEIN: CPT

## 2017-08-11 LAB
DSDNA AB TITR SER CLIF: NORMAL {TITER}
EKG IMPRESSION: NORMAL

## 2017-08-12 LAB
CENTROMERE IGG TITR SER IF: 2 AU/ML (ref 0–40)
CH50 SERPL-ACNC: 108 CAE UNITS (ref 60–144)
ENA SCL70 IGG SER QL: 0 AU/ML (ref 0–40)

## 2017-08-13 LAB
DSDNA AB TITR SER CLIF: ABNORMAL {TITER}
ENA SM IGG SER-ACNC: 282 AU/ML (ref 0–40)
ENA SS-B IGG SER IA-ACNC: 0 AU/ML (ref 0–40)
NUCLEAR IGG SER QL IA: DETECTED
NUCLEAR IGG TITR SER IF: ABNORMAL {TITER}
SSA52 R0ENA AB IGG Q0420: 58 AU/ML (ref 0–40)
SSA60 R0ENA AB IGG Q0419: 92 AU/ML (ref 0–40)
U1 SNRNP IGG SER QL: 132 AU/ML (ref 0–40)

## 2017-08-14 ENCOUNTER — TELEPHONE (OUTPATIENT)
Dept: RHEUMATOLOGY | Facility: PHYSICIAN GROUP | Age: 22
End: 2017-08-14

## 2017-08-14 ENCOUNTER — APPOINTMENT (OUTPATIENT)
Dept: NEUROLOGY | Facility: MEDICAL CENTER | Age: 22
End: 2017-08-14
Payer: COMMERCIAL

## 2017-08-14 LAB
M TB TUBERC IFN-G BLD QL: NEGATIVE
M TB TUBERC IFN-G/MITOGEN IGNF BLD: -0.01
M TB TUBERC IGNF/MITOGEN IGNF CONTROL: 56.75 [IU]/ML
MITOGEN IGNF BCKGRD COR BLD-ACNC: 0.03 [IU]/ML

## 2017-08-24 ENCOUNTER — HOSPITAL ENCOUNTER (OUTPATIENT)
Dept: RADIOLOGY | Facility: MEDICAL CENTER | Age: 22
End: 2017-08-24
Attending: INTERNAL MEDICINE
Payer: COMMERCIAL

## 2017-08-24 DIAGNOSIS — M32.14 LUPUS NEPHRITIS, ISN/RPS CLASS IV (HCC): ICD-10-CM

## 2017-08-24 DIAGNOSIS — M32.9 SYSTEMIC LUPUS ERYTHEMATOSUS, UNSPECIFIED SLE TYPE, UNSPECIFIED ORGAN INVOLVEMENT STATUS (HCC): ICD-10-CM

## 2017-08-24 PROCEDURE — 72070 X-RAY EXAM THORAC SPINE 2VWS: CPT

## 2017-08-24 PROCEDURE — 72040 X-RAY EXAM NECK SPINE 2-3 VW: CPT

## 2017-08-26 ENCOUNTER — OFFICE VISIT (OUTPATIENT)
Dept: URGENT CARE | Facility: PHYSICIAN GROUP | Age: 22
End: 2017-08-26
Payer: COMMERCIAL

## 2017-08-26 VITALS
HEART RATE: 77 BPM | WEIGHT: 145.5 LBS | HEIGHT: 67 IN | RESPIRATION RATE: 14 BRPM | DIASTOLIC BLOOD PRESSURE: 72 MMHG | SYSTOLIC BLOOD PRESSURE: 104 MMHG | TEMPERATURE: 98.7 F | OXYGEN SATURATION: 95 % | BODY MASS INDEX: 22.84 KG/M2

## 2017-08-26 DIAGNOSIS — M62.838 SPASM OF CERVICAL PARASPINOUS MUSCLE: ICD-10-CM

## 2017-08-26 DIAGNOSIS — M62.830 BACK MUSCLE SPASM: Primary | ICD-10-CM

## 2017-08-26 PROCEDURE — 99214 OFFICE O/P EST MOD 30 MIN: CPT | Performed by: PHYSICIAN ASSISTANT

## 2017-08-26 RX ORDER — CYCLOBENZAPRINE HCL 10 MG
10 TABLET ORAL 3 TIMES DAILY PRN
Qty: 30 TAB | Refills: 0 | Status: ON HOLD | OUTPATIENT
Start: 2017-08-26 | End: 2017-10-04

## 2017-08-26 ASSESSMENT — ENCOUNTER SYMPTOMS
MYALGIAS: 1
BACK PAIN: 1

## 2017-08-26 NOTE — LETTER
August 26, 2017         Patient: Lily Nicole   YOB: 1995   Date of Visit: 8/26/2017           To Whom it May Concern:    Lily Nicole was seen in my clinic on 8/26/2017. She may return to work on 08/27/2017.    If you have any questions or concerns, please don't hesitate to call.        Sincerely,           Indira Espinoza P.A.-C.  Electronically Signed

## 2017-08-27 NOTE — PROGRESS NOTES
Subjective:      Lily Nicole is a 21 y.o. female who presents with Back Pain (neck and lower back pain x2 months, worse x2 days)    PMH:  has a past medical history of Dialysis patient (CMS-[facility]); Hemorrhagic disorder (CMS-[facility]); Hypertension; Infectious disease; Lupus (CMS-[facility]); Renal disorder; and Seizure (CMS-HCC) (2013).  MEDS:   Current Outpatient Prescriptions:   •  cyclobenzaprine (FLEXERIL) 10 MG Tab, Take 1 Tab by mouth 3 times a day as needed., Disp: 30 Tab, Rfl: 0  •  atenolol (TENORMIN) 25 MG Tab, Take 25 mg by mouth every bedtime., Disp: , Rfl:   •  predniSONE (DELTASONE) 5 MG Tab, Take 5 mg by mouth every bedtime., Disp: , Rfl:   •  hydroxychloroquine (PLAQUENIL) 200 MG Tab, Take 200 mg by mouth every bedtime., Disp: , Rfl:   ALLERGIES: No Known Allergies  SURGHX:   Past Surgical History:   Procedure Laterality Date   • AV FISTULA CREATION     • ULI BY LAPAROSCOPY  4/5/2010    Performed by SYED MARTELL at SURGERY [facility]   • CHOLECYSTECTOMY     • OTHER      renal biopsy x 3   • OTHER      bone marrow biopsy     SOCHX:  reports that she has never smoked. She has never used smokeless tobacco. She reports that she does not drink alcohol or use drugs.  FH:  Reviewed with patient/family. Not pertinent to this complaint.      Patient presents with:  Back Pain: neck and lower back pain on and off x2 months, worse x2 days. PT does not remember any particular injury but is very active and does exercise.       Back Pain   This is a new problem. The current episode started more than 1 month ago. The problem occurs intermittently. The problem has been waxing and waning since onset. The pain is present in the lumbar spine (neck). The quality of the pain is described as cramping and aching. The pain does not radiate. The pain is moderate. The symptoms are aggravated by bending, position and twisting. Pertinent negatives include no bladder incontinence, bowel incontinence, dysuria, fever or  "paresthesias. She has tried NSAIDs and heat for the symptoms. The treatment provided mild relief.       Review of Systems   Constitutional: Negative for fever.   Gastrointestinal: Negative for bowel incontinence.   Genitourinary: Negative.  Negative for bladder incontinence and dysuria.   Musculoskeletal: Positive for back pain, myalgias and neck pain.   Neurological: Negative for paresthesias.   All other systems reviewed and are negative.         Objective:     /72   Pulse 77   Temp 37.1 °C (98.7 °F)   Resp 14   Ht 1.702 m (5' 7\")   Wt 66 kg (145 lb 8.1 oz)   LMP 07/31/2017   SpO2 95%   BMI 22.79 kg/m²      Physical Exam   Constitutional: She is oriented to person, place, and time. She appears well-developed and well-nourished. No distress.   HENT:   Head: Normocephalic and atraumatic.   Nose: Nose normal.   Eyes: Conjunctivae and EOM are normal. Pupils are equal, round, and reactive to light.   Neck: Normal range of motion. Neck supple.   Cardiovascular: Normal rate, regular rhythm, normal heart sounds and intact distal pulses.    Pulmonary/Chest: Effort normal and breath sounds normal.   Abdominal: Soft. There is no guarding.   Musculoskeletal:        Lumbar back: She exhibits decreased range of motion, tenderness, pain and spasm. She exhibits no bony tenderness and normal pulse.        Back:    Neurological: She is alert and oriented to person, place, and time. She has normal reflexes. She exhibits normal muscle tone. Coordination and gait normal.   Skin: Skin is warm and dry. Capillary refill takes less than 2 seconds.   Psychiatric: She has a normal mood and affect.   Nursing note and vitals reviewed.              Assessment/Plan:     1. Back muscle spasm  cyclobenzaprine (FLEXERIL) 10 MG Tab   2. Spasm of cervical paraspinous muscle  cyclobenzaprine (FLEXERIL) 10 MG Tab     Motrin/Advil/Ibuprophen 600 mg every 6 hours as needed for pain or fever.    PT instructed not to drive or operate heavy " machinery while taking this medication as it causes drowsiness.  PT also instructed not to drink alcohol while taking this medication because it contains benzodiazepines. PT verbalized understanding of these instructions.       PT should follow up with PCP in 1-2 days for re-evaluation if symptoms have not improved.  Discussed red flags and reasons to return to UC or ED.  Pt and/or family verbalized understanding of diagnosis and follow up instructions and was given informational handout on diagnosis.  PT discharged.

## 2017-08-27 NOTE — PATIENT INSTRUCTIONS
Muscle Cramps and Spasms  Muscle cramps and spasms occur when a muscle or muscles tighten and you have no control over this tightening (involuntary muscle contraction). They are a common problem and can develop in any muscle. The most common place is in the calf muscles of the leg. Both muscle cramps and muscle spasms are involuntary muscle contractions, but they also have differences:   · Muscle cramps are sporadic and painful. They may last a few seconds to a quarter of an hour. Muscle cramps are often more forceful and last longer than muscle spasms.  · Muscle spasms may or may not be painful. They may also last just a few seconds or much longer.  CAUSES   It is uncommon for cramps or spasms to be due to a serious underlying problem. In many cases, the cause of cramps or spasms is unknown. Some common causes are:   · Overexertion.    · Overuse from repetitive motions (doing the same thing over and over).    · Remaining in a certain position for a long period of time.    · Improper preparation, form, or technique while performing a sport or activity.    · Dehydration.    · Injury.    · Side effects of some medicines.    · Abnormally low levels of the salts and ions in your blood (electrolytes), especially potassium and calcium. This could happen if you are taking water pills (diuretics) or you are pregnant.    Some underlying medical problems can make it more likely to develop cramps or spasms. These include, but are not limited to:   · Diabetes.    · Parkinson disease.    · Hormone disorders, such as thyroid problems.    · Alcohol abuse.    · Diseases specific to muscles, joints, and bones.    · Blood vessel disease where not enough blood is getting to the muscles.    HOME CARE INSTRUCTIONS   · Stay well hydrated. Drink enough water and fluids to keep your urine clear or pale yellow.  · It may be helpful to massage, stretch, and relax the affected muscle.  · For tight or tense muscles, use a warm towel, heating  pad, or hot shower water directed to the affected area.  · If you are sore or have pain after a cramp or spasm, applying ice to the affected area may relieve discomfort.  ¨ Put ice in a plastic bag.  ¨ Place a towel between your skin and the bag.  ¨ Leave the ice on for 15-20 minutes, 03-04 times a day.  · Medicines used to treat a known cause of cramps or spasms may help reduce their frequency or severity. Only take over-the-counter or prescription medicines as directed by your caregiver.  SEEK MEDICAL CARE IF:   Your cramps or spasms get more severe, more frequent, or do not improve over time.   MAKE SURE YOU:   · Understand these instructions.  · Will watch your condition.  · Will get help right away if you are not doing well or get worse.     This information is not intended to replace advice given to you by your health care provider. Make sure you discuss any questions you have with your health care provider.     Document Released: 06/09/2003 Document Revised: 04/14/2014 Document Reviewed: 12/04/2013  [employer] Interactive Patient Education ©2016 [employer]

## 2017-08-31 DIAGNOSIS — M32.14 LUPUS NEPHRITIS (HCC): ICD-10-CM

## 2017-08-31 ASSESSMENT — ENCOUNTER SYMPTOMS
FEVER: 0
NECK PAIN: 1
BOWEL INCONTINENCE: 0
PARESTHESIAS: 0

## 2017-08-31 NOTE — TELEPHONE ENCOUNTER
Was the patient seen in the last year in this department? Yes     Does patient have an active prescription for medications requested? No     Received Request Via: Patient       Patient needs you to be the one to prescribe this medication. Thank you!

## 2017-09-01 RX ORDER — HYDROXYCHLOROQUINE SULFATE 200 MG/1
200 TABLET, FILM COATED ORAL
Qty: 30 TAB | Refills: 1 | Status: SHIPPED | OUTPATIENT
Start: 2017-09-01 | End: 2017-11-21 | Stop reason: SDUPTHER

## 2017-09-04 NOTE — PROGRESS NOTES
Subjective:   Date of Consultation:9/7/2017  2:21 PM  Primary care physician:ERICA Rangel      Reason for Consultation:  Ms. Martín Nicole  is a pleasant 21 y.o. year old female who presented with followup SLE    SLE  She denies arthralgias, oral ulcers or morning stiffness or fatigue  She did develop a papular rash on her face.  This will happen after her period.  It usually lasts a few day and managed with topical steroids.      RHEUM HISTORY:  Ms. 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 Last dialysis noted was October 12, 2016 she is on Plaquenil and prednisone.  Ms. Lily Nicole states she was diagnosed with SLE 2011 with initial presenting fatigue, loss of appetite, hair loss.  She could not feel her pulse and then was diagnosed with involvement of her kidneys.  She had a kidney biopsy - class IV  She was on myfortic for 1.5 years and then was lost to follow-up.  Her previous dose of myfortic 360mg q day  She was advised to stop myfortic.  She has been out of care x 2 years and off myfortic for two years.     She denies fatigue except occasional - after dialysis.  Her nephrologist is Dr. Mcconnell.  Dialysis was started around May 2015.    She notes intermittent knee pain.  She use to develop malar rash but has not had a recurrence since dialysis.  She notes 6 months ago she started to have hair loss.  She denies nausea no emesis no diarrhea no sicca symptoms, no chest pain or shortness of breath.    She does have Raynauds for 2 years with the starting dialysis.  She denies mouth ulcers.       Labs from 6/20/2016 shows vitiligo protein IgM, IgG, IgA were negative     Labs from 3/29/2017 showed a C3 of 149 a C4 of 33 and an anti-double-stranded DNA of none detected     Current Medication  Hemodialysis MWF   Still makes urine  Plaquenil 200 mg q day  Prednisone 5 mg q day (on this dose x 1 year on chronic prednisone since  diagnosis)  Myfortic 360 mg BID (720 mg daily)     Past medical history of lupus nephritis, history of pancreatitis due to adenovirus history of gallstone pancreatitis status post cholecystectomy; two episodes of shingles.  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 Range 8/10/2017 10:11 8/10/2017 10:34   C3 Complement Latest Ref Range: 87.0 - 200.0 mg/dL 124.0    Complement C4 Latest Ref Range: 19.0 - 52.0 mg/dL 31.0    Complement Total Hemolytic Latest Ref Range: 60 - 144  Units 108    Hepatitis B Surface Antigen Latest Ref Range: Negative  Negative    Hepatitis B Ccore Ab, Total Latest Ref Range: Negative  Negative    Hepatitis C Antibody Latest Ref Range: Negative  Negative    Mitogen-Nil Unknown 56.754    Nil Unknown 0.032    Quantiferon TB Gold Latest Ref Range: Negative  Negative    TB Ag-Nil Unknown -0.011    Rheumatoid Factor -Neph- Latest Ref Range: 0 - 14 IU/mL <10    Stat C-Reactive Protein Latest Ref Range: 0.00 - 0.75 mg/dL 0.47    Anti-Dna -Ds Latest Ref Range: None Detected  None Detected None Detected   Anti-Scl-70 Latest Ref Range: 0 - 40 AU/mL 0    Anti-Centromere B Ab Latest Ref Range: 0 - 40 AU/mL 2    Antinuclear Antibody Latest Ref Range: None Detected   Detected (A)   Rnp Antibodies Latest Ref Range: 0 - 40 AU/mL  132 (H)   Long Antibodies Latest Ref Range: 0 - 40 AU/mL  282 (H)   SSA 52 (R0)(SILVANO) Ab, IgG Latest Ref Range: 0 - 40 AU/mL  58 (H)   SSA 60 (R0)(SILVANO) Ab, IgG Latest Ref Range: 0 - 40 AU/mL  92 (H)   Sjogren'S Anti-Ss-B Latest Ref Range: 0 - 40 AU/mL  0   EDUARDO Titer Latest Ref Range: <1:40   >1:64275 (H)       Past Medical History:   Diagnosis Date   • Seizure (CMS-HCC)     from high blood pressure   • Dialysis patient (CMS-HCC)     pt  gets dialysis M,W,F [facility]   • Hemorrhagic disorder (CMS-[facility])     nose bleeds   • Hypertension    • Infectious disease     pt had the flu on 2/20/17   • Lupus (CMS-HCC)    • Renal disorder      Past Surgical History:   Procedure Laterality Date   • ULI BY LAPAROSCOPY  4/5/2010    Performed by SYED MARTELL at SURGERY [facility]   • AV FISTULA CREATION     • CHOLECYSTECTOMY     • OTHER      renal biopsy x 3   • OTHER      bone marrow biopsy     No Known Allergies  Outpatient Encounter Prescriptions as of 9/7/2017   Medication Sig Dispense Refill   • hydrocortisone 1 % Cream Apply thin later once daily as needed.  Not to be used more than 3 days 1 Tube 0   • hydroxychloroquine (PLAQUENIL) 200 MG Tab Take 1 Tab by mouth every bedtime. 30 Tab 1   • cyclobenzaprine (FLEXERIL) 10 MG Tab Take 1 Tab by mouth 3 times a day as needed. 30 Tab 0   • atenolol (TENORMIN) 25 MG Tab Take 25 mg by mouth every bedtime.     • predniSONE (DELTASONE) 5 MG Tab Take 5 mg by mouth every bedtime.       No facility-administered encounter medications on file as of 9/7/2017.      @IMS@  Social History     Social History   • Marital status: Single     Spouse name: N/A   • Number of children: N/A   • Years of education: N/A     Occupational History   • Not on file.     Social History Main Topics   • Smoking status: Never Smoker   • Smokeless tobacco: Never Used   • Alcohol use No   • Drug use: No   • Sexual activity: Not on file     Other Topics Concern   • Not on file     Social History Narrative   • No narrative on file      History   Smoking Status   • Never Smoker   Smokeless Tobacco   • Never Used     History   Alcohol Use No     History   Drug Use No      OB History   No data available      No LMP recorded.    [facility]     No family history on file.    Review of Systems   Constitutional: Negative for chills and fever.   Musculoskeletal: Negative for joint pain.   Skin: Positive for rash.        Objective:   /60   Pulse 92    Temp 36.4 °C (97.5 °F)   Resp 12   Wt 66.2 kg (146 lb)   SpO2 99%   BMI 22.87 kg/m²     Physical Exam   Constitutional: She is oriented to person, place, and time. She appears well-developed and well-nourished. No distress.   HENT:   Head: Normocephalic and atraumatic.   Right Ear: External ear normal.   Left Ear: External ear normal.   Eyes: Conjunctivae are normal. Right eye exhibits no discharge. Left eye exhibits no discharge. No scleral icterus.   Cardiovascular: Normal rate, regular rhythm and normal heart sounds.    No murmur heard.  Right arm has fistula   Pulmonary/Chest: Effort normal and breath sounds normal. No stridor. No respiratory distress. She has no wheezes. She has no rales.   Musculoskeletal: She exhibits no edema.   Lymphadenopathy:     She has no cervical adenopathy.   Neurological: She is alert and oriented to person, place, and time.   Skin: Skin is warm and dry. She is not diaphoretic. No erythema.   On the right side of the face a mildly papular raised rash over the maxillary region.   Psychiatric: She has a normal mood and affect. Her behavior is normal. Judgment and thought content normal.       Assessment:     1. Facial rash     2. Chronic kidney disease, stage I     3. 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, unspecified SLE type (CMS-HCC)  ANTICARDIOLIPIN AB IGG,IGM,IGA    BETA-2 GLYCOPROTEIN I AB,G,A,M    LUPUS ANTICOAGULANT    CBC WITH DIFFERENTIAL    COMP METABOLIC PANEL    COMPLEMENT TOTAL (CH50)    COMPLEMENT C3    COMPLEMENT C4    WESTERGREN SED RATE    URINALYSIS    CRP QUANTITIVE (NON-CARDIAC)     Labs:      Lab Results   Component Value Date/Time    QNTTBGOLD Negative 08/10/2017 10:11 AM     Lab Results   Component Value Date/Time    HEPBCORTOT Negative 08/10/2017 10:11 AM    HEPBCORIGM Negative 03/29/2010 05:00 PM    HEPBSAG Negative 08/10/2017 10:11 AM     Lab Results   Component Value Date/Time    HEPCAB Negative 08/10/2017 10:11 AM     Lab  Results   Component Value Date/Time    SODIUM 139 08/01/2017 01:10 PM    POTASSIUM 5.1 08/01/2017 01:10 PM    CHLORIDE 105 08/01/2017 01:10 PM    CO2 26 08/01/2017 01:10 PM    GLUCOSE 83 08/01/2017 01:10 PM    BUN 28 (H) 08/01/2017 01:10 PM    CREATININE 6.63 (HH) 08/01/2017 01:10 PM      Lab Results   Component Value Date/Time    WBC 4.6 (L) 07/29/2017 05:35 PM    RBC 3.64 (L) 07/29/2017 05:35 PM    HEMOGLOBIN 10.9 (L) 07/29/2017 05:35 PM    HEMATOCRIT 34.3 (L) 07/29/2017 05:35 PM    MCV 94.2 07/29/2017 05:35 PM    MCH 29.9 07/29/2017 05:35 PM    MCHC 31.8 (L) 07/29/2017 05:35 PM    MPV 9.9 07/29/2017 05:35 PM    NEUTSPOLYS 70.90 07/29/2017 05:35 PM    LYMPHOCYTES 20.60 (L) 07/29/2017 05:35 PM    MONOCYTES 6.50 07/29/2017 05:35 PM    EOSINOPHILS 1.10 07/29/2017 05:35 PM    BASOPHILS 0.70 07/29/2017 05:35 PM    HYPOCHROMIA 1+ 02/18/2011 05:40 AM    ANISOCYTOSIS 2+ 02/18/2011 05:40 AM      Lab Results   Component Value Date/Time    CALCIUM 9.7 08/01/2017 01:10 PM    ASTSGOT 19 07/29/2017 05:35 PM    ALTSGPT 13 07/29/2017 05:35 PM    ALKPHOSPHAT 47 07/29/2017 05:35 PM    TBILIRUBIN 0.4 07/29/2017 05:35 PM    ALBUMIN 4.3 07/29/2017 05:35 PM    ALBUMIN 2.03 (L) 02/17/2011 06:18 PM    TOTPROTEIN 8.1 07/29/2017 05:35 PM    TOTPROTEIN 5.80 (L) 02/17/2011 06:18 PM     Lab Results   Component Value Date/Time    RHEUMFACTN <10 08/10/2017 10:11 AM    ANTINUCAB Detected (A) 08/10/2017 10:34 AM     Lab Results   Component Value Date/Time    SEDRATEWES 39 (H) 08/10/2017 10:11 AM    CREACTPROT 0.47 08/10/2017 10:11 AM     No results found for: KENNA RUBIN  Lab Results   Component Value Date/Time    U5OADNXKOPJ 124.0 08/10/2017 10:11 AM    W5ZFBMLSUXZ 31.0 08/10/2017 10:11 AM     Lab Results   Component Value Date/Time    ANTIDNADS None Detected 08/10/2017 10:34 AM    RNPAB 132 (H) 08/10/2017 10:34 AM    SMITHAB 282 (H) 08/10/2017 10:34 AM    CCZJQDZ65 0 08/10/2017 10:11 AM    CENTROMBAB 2 08/10/2017 10:11 AM     Lab  Results   Component Value Date/Time    ANTIDNADS None Detected 08/10/2017 10:34 AM    DSDNA 1:5120 (H) 02/17/2011 06:18 PM    ANCAIGG <1:20 02/17/2011 06:18 PM    I6IEJSYUWXZ 124.0 08/10/2017 10:11 AM    RNPAB 132 (H) 08/10/2017 10:34 AM    ANTISSASJ 183 (H) 02/17/2011 06:18 PM    ANTISSBSJ 0 08/10/2017 10:34 AM     Lab Results   Component Value Date/Time    COLORURINE Yellow 08/10/2017 10:09 AM    SPECGRAVITY 1.010 08/10/2017 10:09 AM    PHURINE 8.5 (A) 08/10/2017 10:09 AM    GLUCOSEUR Negative 08/10/2017 10:09 AM    KETONES Negative 08/10/2017 10:09 AM    PROTEINURIN 100 (A) 08/10/2017 10:09 AM     Lab Results   Component Value Date/Time    TOTPROTUR See Note 02/17/2011 05:45 PM    TOTPROTUR 1123.0 (H) 02/17/2011 05:45 PM    TOTALVOLUME N/A 02/17/2011 05:45 PM     Lab Results   Component Value Date/Time    SSA60 92 (H) 08/10/2017 10:34 AM    SSA52 58 (H) 08/10/2017 10:34 AM     No results found for: HBA1C  No results found for: CPKTOTAL  No results found for: G6PD  No results found for: WEPB61HKYH  No results found for: ACESERUM  Lab Results   Component Value Date/Time    25HYDROXY 8 (L) 02/17/2011 06:18 PM     No results found for: TSH, FREEDIR  Lab Results   Component Value Date/Time    TSHULTRASEN 2.530 06/07/2017 11:13 AM     No results found for: MICROSOMALA, ANTITHYROGL  No results found for: IGGLYMEABS  No results found for: ANTIMITOCHO, FACTIN  No results found for: IGA, TTRANSIGA, ENDOIGA  No results found for: FLTYPE, CRYSTALSBDF  No results found for: ISTATICAL  No results found for: ISTATCREAT  No results found for: CTELOPEP  No results found for: GBMABG  Lab Results   Component Value Date/Time    PTHINTACT 103.2 (H) 02/17/2011 06:18 PM         Medical Decision Making:  Today's Assessment / Status / Plan:     SLE  Stable with minimal symptoms.  On myfortic managed by nephrology.  We will continue to monitor her and recheck labs in few weeks.  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  We will check APS labs.    LUpus nephritis  Followed by nephrology    Facial rash  Will try topical steroids  Discussed risk of steroids use on skin with the patient. Se was advised to use sparingly and discussed side effects.    1. Facial rash     2. Chronic kidney disease, stage I     3. 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    She agreed and verbalized her agreement and understanding with the current plan. I answered all questions and concerns she has at this time and advised her to call at any time in there interim with questions or concerns in regards to her care.    Thank you for allowing me to participate in her care, I will continue to follow closely.

## 2017-09-07 ENCOUNTER — OFFICE VISIT (OUTPATIENT)
Dept: RHEUMATOLOGY | Facility: PHYSICIAN GROUP | Age: 22
End: 2017-09-07
Payer: COMMERCIAL

## 2017-09-07 VITALS
TEMPERATURE: 97.5 F | RESPIRATION RATE: 12 BRPM | BODY MASS INDEX: 22.87 KG/M2 | HEART RATE: 92 BPM | WEIGHT: 146 LBS | SYSTOLIC BLOOD PRESSURE: 100 MMHG | OXYGEN SATURATION: 99 % | DIASTOLIC BLOOD PRESSURE: 60 MMHG

## 2017-09-07 DIAGNOSIS — N18.1 CHRONIC KIDNEY DISEASE, STAGE I: ICD-10-CM

## 2017-09-07 DIAGNOSIS — R21 FACIAL RASH: ICD-10-CM

## 2017-09-07 DIAGNOSIS — M32.15 SYSTEMIC LUPUS ERYTHEMATOSUS WITH TUBULO-INTERSTITIAL NEPHROPATHY, UNSPECIFIED SLE TYPE (HCC): ICD-10-CM

## 2017-09-07 PROCEDURE — 99214 OFFICE O/P EST MOD 30 MIN: CPT | Performed by: INTERNAL MEDICINE

## 2017-09-07 RX ORDER — BENZOCAINE/MENTHOL 6 MG-10 MG
LOZENGE MUCOUS MEMBRANE
Qty: 1 TUBE | Refills: 0 | Status: ON HOLD | OUTPATIENT
Start: 2017-09-07 | End: 2017-10-04

## 2017-09-07 ASSESSMENT — ENCOUNTER SYMPTOMS
FEVER: 0
CHILLS: 0

## 2017-09-07 NOTE — LETTER
[facility]   [address], [address]  [place] [address]  Phone: [phone]  Fax: [phone]              Encounter Date: 9/7/2017    Dear Dr. Rivera ref. provider found,    It was a pleasure seeing your patient, Lily Nicole, on 9/7/2017. 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     Please find attached progress note which includes the history I obtained from Ms. Martín Nicole, my physical examination findings, my impression and recommendations.      Once again, it was a pleasure participating in your patient's care.  Please feel free to contact me if you have any questions or if I can be of any further assistance to your patients.      Sincerely,    Gloria Rick M.D.  Electronically Signed          PROGRESS NOTE:  Subjective:   Date of Consultation:9/7/2017  2:21 PM  Primary care physician:ERICA Rangel      Reason for Consultation:  Ms. Martín Nicole  is a pleasant 21 y.o. year old female who presented with followup SLE    SLE  She denies arthralgias, oral ulcers or morning stiffness or fatigue  She did develop a papular rash on her face.  This will happen after her period.  It usually lasts a few day and managed with topical steroids.      RHEUM HISTORY:  Ms. 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 Last dialysis noted was October 12, 2016 she is on Plaquenil and prednisone.  Ms. Lily Nicole states she was diagnosed with SLE 2011 with initial presenting fatigue, loss of appetite, hair loss.  She could not feel her pulse and then was diagnosed with involvement of her kidneys.  She had a kidney biopsy - class IV  She was on myfortic for 1.5 years and then was lost to follow-up.  Her previous dose of myfortic 360mg q day  She was advised to stop  myfortic.  She has been out of care x 2 years and off myfortic for two years.     She denies fatigue except occasional - after dialysis.  Her nephrologist is Dr. Mcconnell.  Dialysis was started around May 2015.    She notes intermittent knee pain.  She use to develop malar rash but has not had a recurrence since dialysis.  She notes 6 months ago she started to have hair loss.  She denies nausea no emesis no diarrhea no sicca symptoms, no chest pain or shortness of breath.    She does have Raynauds for 2 years with the starting dialysis.  She denies mouth ulcers.       Labs from 2016 shows vitiligo protein IgM, IgG, IgA were negative     Labs from 3/29/2017 showed a C3 of 149 a C4 of 33 and an anti-double-stranded DNA of none detected     Current Medication  Hemodialysis MWF   Still makes urine  Plaquenil 200 mg q day  Prednisone 5 mg q day (on this dose x 1 year on chronic prednisone since diagnosis)  Myfortic 360 mg BID (720 mg daily)     Past medical history of lupus nephritis, history of pancreatitis due to adenovirus history of gallstone pancreatitis status post cholecystectomy; two episodes of shingles.  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 Range 8/10/2017 10:11 8/10/2017 10:34   C3 Complement Latest Ref Range: 87.0 - 200.0 mg/dL 124.0    Complement C4 Latest Ref Range: 19.0 - 52.0 mg/dL 31.0    Complement Total Hemolytic Latest Ref Range: 60 - 144  Units 108    Hepatitis B Surface Antigen Latest Ref Range: Negative  Negative    Hepatitis B Ccore Ab, Total Latest Ref Range: Negative  Negative    Hepatitis C Antibody Latest Ref Range: Negative  Negative    Mitogen-Nil Unknown 56.754    Nil Unknown 0.032    Quantiferon TB Gold Latest Ref Range: Negative   Negative    TB Ag-Nil Unknown -0.011    Rheumatoid Factor -Neph- Latest Ref Range: 0 - 14 IU/mL <10    Stat C-Reactive Protein Latest Ref Range: 0.00 - 0.75 mg/dL 0.47    Anti-Dna -Ds Latest Ref Range: None Detected  None Detected None Detected   Anti-Scl-70 Latest Ref Range: 0 - 40 AU/mL 0    Anti-Centromere B Ab Latest Ref Range: 0 - 40 AU/mL 2    Antinuclear Antibody Latest Ref Range: None Detected   Detected (A)   Rnp Antibodies Latest Ref Range: 0 - 40 AU/mL  132 (H)   Long Antibodies Latest Ref Range: 0 - 40 AU/mL  282 (H)   SSA 52 (R0)(SILVANO) Ab, IgG Latest Ref Range: 0 - 40 AU/mL  58 (H)   SSA 60 (R0)(SILVANO) Ab, IgG Latest Ref Range: 0 - 40 AU/mL  92 (H)   Sjogren'S Anti-Ss-B Latest Ref Range: 0 - 40 AU/mL  0   EDUARDO Titer Latest Ref Range: <1:40   >1:39667 (H)       Past Medical History:   Diagnosis Date   • Seizure (CMS-HCC) 2013    from high blood pressure   • Dialysis patient (CMS-HCC)     pt gets dialysis M,W,F [facility]   • Hemorrhagic disorder (CMS-HCC)     nose bleeds   • Hypertension    • Infectious disease     pt had the flu on 2/20/17   • Lupus (CMS-HCC)    • Renal disorder      Past Surgical History:   Procedure Laterality Date   • ULI BY LAPAROSCOPY  4/5/2010    Performed by SYED MARTELL at SURGERY [facility] ORS   • AV FISTULA CREATION     • CHOLECYSTECTOMY     • OTHER      renal biopsy x 3   • OTHER      bone marrow biopsy     No Known Allergies  Outpatient Encounter Prescriptions as of 9/7/2017   Medication Sig Dispense Refill   • hydrocortisone 1 % Cream Apply thin later once daily as needed.  Not to be used more than 3 days 1 Tube 0   • hydroxychloroquine (PLAQUENIL) 200 MG Tab Take 1 Tab by mouth every bedtime. 30 Tab 1   • cyclobenzaprine (FLEXERIL) 10 MG Tab Take 1 Tab by mouth 3 times a day as needed. 30 Tab 0   • atenolol (TENORMIN) 25 MG Tab Take 25 mg by mouth every bedtime.     • predniSONE (DELTASONE) 5 MG Tab Take 5 mg by mouth every bedtime.       No facility-administered encounter  medications on file as of 9/7/2017.      @[facility]@  Social History     Social History   • Marital status: Single     Spouse name: N/A   • Number of children: N/A   • Years of education: N/A     Occupational History   • Not on file.     Social History Main Topics   • Smoking status: Never Smoker   • Smokeless tobacco: Never Used   • Alcohol use No   • Drug use: No   • Sexual activity: Not on file     Other Topics Concern   • Not on file     Social History Narrative   • No narrative on file      History   Smoking Status   • Never Smoker   Smokeless Tobacco   • Never Used     History   Alcohol Use No     History   Drug Use No      OB History   No data available      No LMP recorded.    [facility]     No family history on file.    Review of Systems   Constitutional: Negative for chills and fever.   Musculoskeletal: Negative for joint pain.   Skin: Positive for rash.        Objective:   /60   Pulse 92   Temp 36.4 °C (97.5 °F)   Resp 12   Wt 66.2 kg (146 lb)   SpO2 99%   BMI 22.87 kg/m²      Physical Exam   Constitutional: She is oriented to person, place, and time. She appears well-developed and well-nourished. No distress.   HENT:   Head: Normocephalic and atraumatic.   Right Ear: External ear normal.   Left Ear: External ear normal.   Eyes: Conjunctivae are normal. Right eye exhibits no discharge. Left eye exhibits no discharge. No scleral icterus.   Cardiovascular: Normal rate, regular rhythm and normal heart sounds.    No murmur heard.  Right arm has fistula   Pulmonary/Chest: Effort normal and breath sounds normal. No stridor. No respiratory distress. She has no wheezes. She has no rales.   Musculoskeletal: She exhibits no edema.   Lymphadenopathy:     She has no cervical adenopathy.   Neurological: She is alert and oriented to person, place, and time.   Skin: Skin is warm and dry. She is not diaphoretic. No erythema.   On the right side of the face a mildly papular raised rash over the maxillary region.      Psychiatric: She has a normal mood and affect. Her behavior is normal. Judgment and thought content normal.       Assessment:     1. Facial rash     2. Chronic kidney disease, stage I     3. 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, unspecified SLE type (CMS-HCC)  ANTICARDIOLIPIN AB IGG,IGM,IGA    BETA-2 GLYCOPROTEIN I AB,G,A,M    LUPUS ANTICOAGULANT    CBC WITH DIFFERENTIAL    COMP METABOLIC PANEL    COMPLEMENT TOTAL (CH50)    COMPLEMENT C3    COMPLEMENT C4    WESTERGREN SED RATE    URINALYSIS    CRP QUANTITIVE (NON-CARDIAC)     Labs:      Lab Results   Component Value Date/Time    QNTTBGOLD Negative 08/10/2017 10:11 AM     Lab Results   Component Value Date/Time    HEPBCORTOT Negative 08/10/2017 10:11 AM    HEPBCORIGM Negative 03/29/2010 05:00 PM    HEPBSAG Negative 08/10/2017 10:11 AM     Lab Results   Component Value Date/Time    HEPCAB Negative 08/10/2017 10:11 AM     Lab Results   Component Value Date/Time    SODIUM 139 08/01/2017 01:10 PM    POTASSIUM 5.1 08/01/2017 01:10 PM    CHLORIDE 105 08/01/2017 01:10 PM    CO2 26 08/01/2017 01:10 PM    GLUCOSE 83 08/01/2017 01:10 PM    BUN 28 (H) 08/01/2017 01:10 PM    CREATININE 6.63 (HH) 08/01/2017 01:10 PM      Lab Results   Component Value Date/Time    WBC 4.6 (L) 07/29/2017 05:35 PM    RBC 3.64 (L) 07/29/2017 05:35 PM    HEMOGLOBIN 10.9 (L) 07/29/2017 05:35 PM    HEMATOCRIT 34.3 (L) 07/29/2017 05:35 PM    MCV 94.2 07/29/2017 05:35 PM    MCH 29.9 07/29/2017 05:35 PM    MCHC 31.8 (L) 07/29/2017 05:35 PM    MPV 9.9 07/29/2017 05:35 PM    NEUTSPOLYS 70.90 07/29/2017 05:35 PM    LYMPHOCYTES 20.60 (L) 07/29/2017 05:35 PM    MONOCYTES 6.50 07/29/2017 05:35 PM    EOSINOPHILS 1.10 07/29/2017 05:35 PM    BASOPHILS 0.70 07/29/2017 05:35 PM    HYPOCHROMIA 1+ 02/18/2011 05:40 AM    ANISOCYTOSIS 2+ 02/18/2011 05:40 AM      Lab Results   Component Value Date/Time    CALCIUM 9.7 08/01/2017 01:10 PM    ASTSGOT 19 07/29/2017 05:35 PM    ALTSGPT 13  07/29/2017 05:35 PM    ALKPHOSPHAT 47 07/29/2017 05:35 PM    TBILIRUBIN 0.4 07/29/2017 05:35 PM    ALBUMIN 4.3 07/29/2017 05:35 PM    ALBUMIN 2.03 (L) 02/17/2011 06:18 PM    TOTPROTEIN 8.1 07/29/2017 05:35 PM    TOTPROTEIN 5.80 (L) 02/17/2011 06:18 PM     Lab Results   Component Value Date/Time    RHEUMFACTN <10 08/10/2017 10:11 AM    ANTINUCAB Detected (A) 08/10/2017 10:34 AM     Lab Results   Component Value Date/Time    SEDRATEWES 39 (H) 08/10/2017 10:11 AM    CREACTPROT 0.47 08/10/2017 10:11 AM     No results found for: KENNA RUBIN  Lab Results   Component Value Date/Time    A6FMJMYNNFK 124.0 08/10/2017 10:11 AM    H5BQRHJCBAW 31.0 08/10/2017 10:11 AM     Lab Results   Component Value Date/Time    ANTIDNADS None Detected 08/10/2017 10:34 AM    RNPAB 132 (H) 08/10/2017 10:34 AM    SMITHAB 282 (H) 08/10/2017 10:34 AM    RWZIOZU97 0 08/10/2017 10:11 AM    CENTROMBAB 2 08/10/2017 10:11 AM     Lab Results   Component Value Date/Time    ANTIDNADS None Detected 08/10/2017 10:34 AM    DSDNA 1:5120 (H) 02/17/2011 06:18 PM    ANCAIGG <1:20 02/17/2011 06:18 PM    T4YXQTRHDTS 124.0 08/10/2017 10:11 AM    RNPAB 132 (H) 08/10/2017 10:34 AM    ANTISSASJ 183 (H) 02/17/2011 06:18 PM    ANTISSBSJ 0 08/10/2017 10:34 AM     Lab Results   Component Value Date/Time    COLORURINE Yellow 08/10/2017 10:09 AM    SPECGRAVITY 1.010 08/10/2017 10:09 AM    PHURINE 8.5 (A) 08/10/2017 10:09 AM    GLUCOSEUR Negative 08/10/2017 10:09 AM    KETONES Negative 08/10/2017 10:09 AM    PROTEINURIN 100 (A) 08/10/2017 10:09 AM     Lab Results   Component Value Date/Time    TOTPROTUR See Note 02/17/2011 05:45 PM    TOTPROTUR 1123.0 (H) 02/17/2011 05:45 PM    TOTALVOLUME N/A 02/17/2011 05:45 PM     Lab Results   Component Value Date/Time    SSA60 92 (H) 08/10/2017 10:34 AM    SSA52 58 (H) 08/10/2017 10:34 AM     No results found for: HBA1C  No results found for: CPKTOTAL  No results found for: G6PD  No results found for: FRVP15VRZC  No results  found for: ACESERUM  Lab Results   Component Value Date/Time    25HYDROXY 8 (L) 02/17/2011 06:18 PM     No results found for: TSH, FREEDIR  Lab Results   Component Value Date/Time    TSHULTRASEN 2.530 06/07/2017 11:13 AM     No results found for: MICROSOMALA, ANTITHYROGL  No results found for: IGGLYMEABS  No results found for: ANTIMITOCHO, FACTIN  No results found for: IGA, TTRANSIGA, ENDOIGA  No results found for: FLTYPE, CRYSTALSBDF  No results found for: ISTATICAL  No results found for: ISTATCREAT  No results found for: CTELOPEP  No results found for: GBMABG  Lab Results   Component Value Date/Time    PTHINTACT 103.2 (H) 02/17/2011 06:18 PM         Medical Decision Making:  Today's Assessment / Status / Plan:     SLE  Stable with minimal symptoms.  On myfortic managed by nephrology.  We will continue to monitor her and recheck labs in few weeks.  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  We will check APS labs.    LUpus nephritis  Followed by nephrology    Facial rash  Will try topical steroids  Discussed risk of steroids use on skin with the patient. Se was advised to use sparingly and discussed side effects.    1. Facial rash     2. Chronic kidney disease, stage I     3. 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    She agreed and verbalized her agreement and understanding with the current plan. I answered all questions and concerns she has at this time and advised her  to call at any time in there interim with questions or concerns in regards to her care.    Thank you for allowing me to participate in her care, I will continue to follow closely.

## 2017-09-11 ENCOUNTER — OFFICE VISIT (OUTPATIENT)
Dept: URGENT CARE | Facility: PHYSICIAN GROUP | Age: 22
End: 2017-09-11
Payer: COMMERCIAL

## 2017-09-11 VITALS
HEART RATE: 72 BPM | HEIGHT: 66 IN | RESPIRATION RATE: 12 BRPM | TEMPERATURE: 98.1 F | DIASTOLIC BLOOD PRESSURE: 68 MMHG | WEIGHT: 144.5 LBS | SYSTOLIC BLOOD PRESSURE: 110 MMHG | BODY MASS INDEX: 23.22 KG/M2

## 2017-09-11 DIAGNOSIS — R51.9 ACUTE NONINTRACTABLE HEADACHE, UNSPECIFIED HEADACHE TYPE: ICD-10-CM

## 2017-09-11 DIAGNOSIS — J02.9 ACUTE PHARYNGITIS, UNSPECIFIED ETIOLOGY: Primary | ICD-10-CM

## 2017-09-11 DIAGNOSIS — R00.2 HEART PALPITATIONS: ICD-10-CM

## 2017-09-11 LAB
INT CON NEG: NEGATIVE
INT CON POS: POSITIVE
S PYO AG THROAT QL: NORMAL

## 2017-09-11 PROCEDURE — 87880 STREP A ASSAY W/OPTIC: CPT | Performed by: NURSE PRACTITIONER

## 2017-09-11 PROCEDURE — 99214 OFFICE O/P EST MOD 30 MIN: CPT | Performed by: NURSE PRACTITIONER

## 2017-09-11 ASSESSMENT — ENCOUNTER SYMPTOMS
SYNCOPE: 0
HEADACHES: 1
BLURRED VISION: 0
DIZZINESS: 0
CHILLS: 0
SPEECH CHANGE: 0
NAUSEA: 0
SENSORY CHANGE: 0
LOSS OF BALANCE: 0
VOMITING: 0
FOCAL WEAKNESS: 0
LOSS OF CONSCIOUSNESS: 0
PHOTOPHOBIA: 0
FEVER: 0
SHORTNESS OF BREATH: 0
PALPITATIONS: 1
CHEST PRESSURE: 0
SORE THROAT: 1

## 2017-09-11 NOTE — PROGRESS NOTES
Subjective:      Lily Nicole is a 21 y.o. female who presents with Palpitations (Feeling heart palpitations and headache after dialysis this morning x 7hrs, )            Pt states she had dialysis this am afterward she had a headache which is a typical headache after dialysis but did not resolved with fluids as normal. Also c/o intermittent palpations earlier gradually resolving this afternoon.       Palpitations    This is a new problem. The current episode started today. The problem occurs intermittently. The problem has been gradually improving. Nothing aggravates the symptoms. Pertinent negatives include no chest fullness, chest pain, dizziness, fever, nausea, shortness of breath, syncope or vomiting. She has tried nothing for the symptoms. renal failure and lupus   Headache    This is a recurrent problem. The current episode started today. The problem occurs constantly. The problem has been unchanged. The pain is located in the bilateral region. The pain does not radiate. The pain quality is similar to prior headaches. The quality of the pain is described as aching. The pain is at a severity of 9/10. Associated symptoms include a sore throat. Pertinent negatives include no blurred vision, dizziness, fever, loss of balance, nausea, photophobia or vomiting. Nothing aggravates the symptoms. Treatments tried: gatoraide. (Renal failure and lupus)       Review of Systems   Constitutional: Negative for chills and fever.   HENT: Positive for sore throat.    Eyes: Negative for blurred vision and photophobia.   Respiratory: Negative for shortness of breath.    Cardiovascular: Positive for palpitations. Negative for chest pain and syncope.   Gastrointestinal: Negative for nausea and vomiting.   Neurological: Positive for headaches. Negative for dizziness, sensory change, speech change, focal weakness, loss of consciousness and loss of balance.          Objective:     /68   Pulse 72   Temp 36.7 °C (98.1 °F)  "  Resp 12   Ht 1.676 m (5' 6\")   Wt 65.5 kg (144 lb 8 oz)   Breastfeeding? No   BMI 23.32 kg/m²      Physical Exam   Constitutional: She appears well-developed and well-nourished. No distress.   HENT:   Head: Normocephalic and atraumatic.   Mouth/Throat: Uvula is midline. No trismus in the jaw. No uvula swelling. Oropharyngeal exudate, posterior oropharyngeal edema and posterior oropharyngeal erythema present.   Eyes: Conjunctivae are normal. Pupils are equal, round, and reactive to light.   Neck: Neck supple.   Cardiovascular: Normal rate, regular rhythm and normal heart sounds.   No extrasystoles are present.   Pulmonary/Chest: Effort normal and breath sounds normal. No respiratory distress.   Neurological: She is alert. No cranial nerve deficit (2-12).   Awake, alert, answering questions appropriately, moving all extremeties   Skin: Skin is warm and dry. No rash noted.   Psychiatric: She has a normal mood and affect. Her behavior is normal.   Vitals reviewed.              Assessment/Plan:     1. Acute pharyngitis, unspecified etiology  POCT Rapid Strep A   2. Acute nonintractable headache, unspecified headache type     3. Heart palpitations         poct strep - neg    D/w Dr Amador     D/w pt and mom to return home drink gatoraide take tylenol for headache or norco that she has at home    Pt will go to the ER for worsening or changing symptoms as discussed, return of the palpitations.   Follow-up with your primary care provider or return here if not improving in 1-2 days   Discharge instructions discussed with pt/family who verbalize understanding and agreement with poc    "

## 2017-09-11 NOTE — LETTER
September 11, 2017         Patient: Lily Nicole   YOB: 1995   Date of Visit: 9/11/2017           To Whom it May Concern:    Lily Nicole was seen in my clinic on 9/11/2017. She should be off work today due to illness.       If you have any questions or concerns, please don't hesitate to call.        Sincerely,           MERISSA Mcbride.  Electronically Signed

## 2017-09-23 ENCOUNTER — OFFICE VISIT (OUTPATIENT)
Dept: URGENT CARE | Facility: PHYSICIAN GROUP | Age: 22
End: 2017-09-23
Payer: COMMERCIAL

## 2017-09-23 VITALS
HEIGHT: 67 IN | BODY MASS INDEX: 22.44 KG/M2 | SYSTOLIC BLOOD PRESSURE: 112 MMHG | OXYGEN SATURATION: 97 % | TEMPERATURE: 97.9 F | DIASTOLIC BLOOD PRESSURE: 70 MMHG | WEIGHT: 143 LBS | HEART RATE: 88 BPM

## 2017-09-23 DIAGNOSIS — J06.9 VIRAL URI WITH COUGH: ICD-10-CM

## 2017-09-23 LAB
APPEARANCE UR: CLEAR
BILIRUB UR STRIP-MCNC: NEGATIVE MG/DL
COLOR UR AUTO: YELLOW
FLUAV+FLUBV AG SPEC QL IA: NEGATIVE
GLUCOSE UR STRIP.AUTO-MCNC: NEGATIVE MG/DL
INT CON NEG: NEGATIVE
INT CON NEG: NEGATIVE
INT CON POS: POSITIVE
INT CON POS: POSITIVE
KETONES UR STRIP.AUTO-MCNC: NEGATIVE MG/DL
LEUKOCYTE ESTERASE UR QL STRIP.AUTO: NEGATIVE
NITRITE UR QL STRIP.AUTO: NEGATIVE
PH UR STRIP.AUTO: 8.5 [PH] (ref 5–8)
PROT UR QL STRIP: 30 MG/DL
RBC UR QL AUTO: ABNORMAL
S PYO AG THROAT QL: NEGATIVE
SP GR UR STRIP.AUTO: 1
UROBILINOGEN UR STRIP-MCNC: NEGATIVE MG/DL

## 2017-09-23 PROCEDURE — 87804 INFLUENZA ASSAY W/OPTIC: CPT | Performed by: PHYSICIAN ASSISTANT

## 2017-09-23 PROCEDURE — 81002 URINALYSIS NONAUTO W/O SCOPE: CPT | Performed by: PHYSICIAN ASSISTANT

## 2017-09-23 PROCEDURE — 99214 OFFICE O/P EST MOD 30 MIN: CPT | Performed by: PHYSICIAN ASSISTANT

## 2017-09-23 PROCEDURE — 87880 STREP A ASSAY W/OPTIC: CPT | Performed by: PHYSICIAN ASSISTANT

## 2017-09-23 RX ORDER — BENZONATATE 100 MG/1
100 CAPSULE ORAL 3 TIMES DAILY PRN
Qty: 60 CAP | Refills: 0 | Status: ON HOLD | OUTPATIENT
Start: 2017-09-23 | End: 2017-10-04

## 2017-09-23 ASSESSMENT — PAIN SCALES - GENERAL: PAINLEVEL: 8=MODERATE-SEVERE PAIN

## 2017-09-23 NOTE — LETTER
September 23, 2017         Patient: Lily Nicole   YOB: 1995   Date of Visit: 9/23/2017           To Whom it May Concern:    Lily Nicole was seen in my clinic on 9/23/2017. Please excuse her absence from 9/23/17-9/24/17.    If you have any questions or concerns, please don't hesitate to call.        Sincerely,           Selam Sarabia P.A.-C.  Electronically Signed

## 2017-09-25 ASSESSMENT — ENCOUNTER SYMPTOMS
ABDOMINAL PAIN: 0
NAUSEA: 0
VOMITING: 0
FEVER: 0
COUGH: 1
SORE THROAT: 1
WHEEZING: 0
SPUTUM PRODUCTION: 0
CHILLS: 0
BACK PAIN: 1
DIARRHEA: 0
DIZZINESS: 0
SHORTNESS OF BREATH: 0

## 2017-09-25 ASSESSMENT — COPD QUESTIONNAIRES: COPD: 0

## 2017-09-25 NOTE — PROGRESS NOTES
"Subjective:      Lily Nicole is a 21 y.o. female who presents with Back Pain (middle back pain. Pt has cold x2 days. Pt states when she laid down last night she couldnt breath ad felt pain in her back . Dry Cough - pt last dialysis appointment was 9/22)        Patient is accompanied by her mother.     Cough   This is a new problem. The current episode started in the past 7 days (2 days). The problem has been unchanged. The problem occurs every few minutes. The cough is non-productive. Associated symptoms include a sore throat. Pertinent negatives include no chest pain, chills, ear pain, fever, shortness of breath or wheezing. The symptoms are aggravated by lying down. She has tried nothing for the symptoms. There is no history of asthma, COPD or pneumonia.   Patient also reports ongoing left sided back pain. She denies any previous injury contributing to her back pain. She denies any fevers, chills, chest pain, SOB, abdominal pain, constipation, diarrhea, or any urinary symptoms. PMH is significant for chronic kidney disease, currently on dialysis.     Review of Systems   Constitutional: Negative for chills and fever.   HENT: Positive for sore throat. Negative for ear pain.    Respiratory: Positive for cough. Negative for sputum production, shortness of breath and wheezing.    Cardiovascular: Negative for chest pain.   Gastrointestinal: Negative for abdominal pain, diarrhea, nausea and vomiting.   Genitourinary: Negative.    Musculoskeletal: Positive for back pain.   Neurological: Negative for dizziness.          Objective:     /70   Pulse 88   Temp 36.6 °C (97.9 °F)   Ht 1.702 m (5' 7\")   Wt 64.9 kg (143 lb)   SpO2 97%   Breastfeeding? No   BMI 22.40 kg/m²      Physical Exam   Constitutional: She is oriented to person, place, and time. She appears well-developed and well-nourished. No distress.   HENT:   Head: Normocephalic and atraumatic.   Right Ear: Hearing, tympanic membrane, external ear " and ear canal normal.   Left Ear: Hearing, tympanic membrane, external ear and ear canal normal.   Nose: Nose normal.   Mouth/Throat: Oropharynx is clear and moist. No oropharyngeal exudate.   Posterior oropharynx mildly erythematous without enlarged tonsils or exudate present.    Eyes: Conjunctivae are normal. Pupils are equal, round, and reactive to light.   Neck: Normal range of motion.   Cardiovascular: Normal rate, regular rhythm and normal heart sounds.    No murmur heard.  Pulmonary/Chest: Effort normal and breath sounds normal. No respiratory distress. She has no wheezes. She has no rales.   Dry cough present throughout exam   Musculoskeletal: Normal range of motion.   Lymphadenopathy:     She has no cervical adenopathy.   Neurological: She is alert and oriented to person, place, and time.   Skin: Skin is warm and dry. She is not diaphoretic.   Psychiatric: She has a normal mood and affect. Her behavior is normal.   Nursing note and vitals reviewed.         PMH:  has a past medical history of Dialysis patient (CMS-HCC); Hemorrhagic disorder (CMS-HCC); Hypertension; Infectious disease; Lupus (CMS-HCC); Renal disorder; and Seizure (CMS-HCC) (2013).  MEDS:   Current Outpatient Prescriptions:   •  benzonatate (TESSALON) 100 MG Cap, Take 1 Cap by mouth 3 times a day as needed for Cough., Disp: 60 Cap, Rfl: 0  •  hydroxychloroquine (PLAQUENIL) 200 MG Tab, Take 1 Tab by mouth every bedtime., Disp: 30 Tab, Rfl: 1  •  cyclobenzaprine (FLEXERIL) 10 MG Tab, Take 1 Tab by mouth 3 times a day as needed., Disp: 30 Tab, Rfl: 0  •  atenolol (TENORMIN) 25 MG Tab, Take 25 mg by mouth every bedtime., Disp: , Rfl:   •  predniSONE (DELTASONE) 5 MG Tab, Take 5 mg by mouth every bedtime., Disp: , Rfl:   •  hydrocortisone 1 % Cream, Apply thin later once daily as needed.  Not to be used more than 3 days, Disp: 1 Tube, Rfl: 0  ALLERGIES: No Known Allergies  SURGHX:   Past Surgical History:   Procedure Laterality Date   • ULI BY  LAPAROSCOPY  4/5/2010    Performed by SYED MARTELL at SURGERY [facility] ORS   • AV FISTULA CREATION     • CHOLECYSTECTOMY     • OTHER      renal biopsy x 3   • OTHER      bone marrow biopsy     SOCHX:  reports that she has never smoked. She has never used smokeless tobacco. She reports that she does not drink alcohol or use drugs.  FH: family history is not on file.     POCT Urinalysis:   Component Results     Component Value Ref Range & Units Status   POC Color yellow Negative Final   POC Appearance clear Negative Final   POC Leukocyte Esterase negative Negative Final   POC Nitrites negative Negative Final   POC Urobiligen negative Negative (0.2) mg/dL Final   POC Protein 30 Negative mg/dL Final   POC Urine PH 8.5  5.0 - 8.0 Final   POC Blood LG Negative Final   POC Specific Gravity 1.000 <1.005 - >1.030 Final   POC Ketones Negative Negative mg/dL Final   POC Biliruben negative Negative mg/dL Final   POC Glucose negative Negative mg/dL Final   Last Resulted Time   Sat Sep 23, 2017 11:05 AM       Assessment/Plan:     1. Viral URI with cough  - POCT Influenza A/B: NEGATIVE  - POCT Rapid Strep A: NEGATIVE  - POCT Urinalysis  - benzonatate (TESSALON) 100 MG Cap; Take 1 Cap by mouth 3 times a day as needed for Cough.  Dispense: 60 Cap; Refill: 0    Advised patient her symptoms are most likely viral in etiology, recommend supportive care. Increased fluids and rest. Call or return to office if symptoms persist or worsen. The patient demonstrated a good understanding and agreed with the treatment plan.

## 2017-10-04 ENCOUNTER — HOSPITAL ENCOUNTER (OUTPATIENT)
Facility: MEDICAL CENTER | Age: 22
End: 2017-10-04
Attending: SURGERY | Admitting: SURGERY
Payer: COMMERCIAL

## 2017-10-04 ENCOUNTER — APPOINTMENT (OUTPATIENT)
Dept: RADIOLOGY | Facility: MEDICAL CENTER | Age: 22
End: 2017-10-04
Attending: SURGERY
Payer: COMMERCIAL

## 2017-10-04 VITALS
SYSTOLIC BLOOD PRESSURE: 102 MMHG | WEIGHT: 139.55 LBS | DIASTOLIC BLOOD PRESSURE: 56 MMHG | RESPIRATION RATE: 16 BRPM | BODY MASS INDEX: 21.86 KG/M2 | TEMPERATURE: 97.2 F | HEART RATE: 73 BPM | OXYGEN SATURATION: 98 %

## 2017-10-04 DIAGNOSIS — Z99.2 DEPENDENCE ON RENAL DIALYSIS (HCC): ICD-10-CM

## 2017-10-04 DIAGNOSIS — N18.6 END STAGE RENAL DISEASE (HCC): ICD-10-CM

## 2017-10-04 DIAGNOSIS — M79.621 PAIN IN RIGHT UPPER ARM: ICD-10-CM

## 2017-10-04 LAB
ANION GAP SERPL CALC-SCNC: 11 MMOL/L (ref 0–11.9)
BUN SERPL-MCNC: 14 MG/DL (ref 8–22)
CALCIUM SERPL-MCNC: 9.7 MG/DL (ref 8.5–10.5)
CHLORIDE SERPL-SCNC: 97 MMOL/L (ref 96–112)
CO2 SERPL-SCNC: 25 MMOL/L (ref 20–33)
CREAT SERPL-MCNC: 3.85 MG/DL (ref 0.5–1.4)
ERYTHROCYTE [DISTWIDTH] IN BLOOD BY AUTOMATED COUNT: 43.8 FL (ref 35.9–50)
GFR SERPL CREATININE-BSD FRML MDRD: 15 ML/MIN/1.73 M 2
GLUCOSE SERPL-MCNC: 84 MG/DL (ref 65–99)
HCG SERPL QL: NEGATIVE
HCT VFR BLD AUTO: 30.9 % (ref 37–47)
HGB BLD-MCNC: 10.4 G/DL (ref 12–16)
MCH RBC QN AUTO: 30.4 PG (ref 27–33)
MCHC RBC AUTO-ENTMCNC: 33.7 G/DL (ref 33.6–35)
MCV RBC AUTO: 90.4 FL (ref 81.4–97.8)
PLATELET # BLD AUTO: 168 K/UL (ref 164–446)
PMV BLD AUTO: 9.8 FL (ref 9–12.9)
POTASSIUM SERPL-SCNC: 3.8 MMOL/L (ref 3.6–5.5)
RBC # BLD AUTO: 3.42 M/UL (ref 4.2–5.4)
SODIUM SERPL-SCNC: 133 MMOL/L (ref 135–145)
WBC # BLD AUTO: 5.7 K/UL (ref 4.8–10.8)

## 2017-10-04 PROCEDURE — 80048 BASIC METABOLIC PNL TOTAL CA: CPT

## 2017-10-04 PROCEDURE — 99153 MOD SED SAME PHYS/QHP EA: CPT

## 2017-10-04 PROCEDURE — 84703 CHORIONIC GONADOTROPIN ASSAY: CPT

## 2017-10-04 PROCEDURE — 85027 COMPLETE CBC AUTOMATED: CPT

## 2017-10-04 PROCEDURE — 700117 HCHG RX CONTRAST REV CODE 255: Performed by: SURGERY

## 2017-10-04 PROCEDURE — 700111 HCHG RX REV CODE 636 W/ 250 OVERRIDE (IP): Performed by: SURGERY

## 2017-10-04 PROCEDURE — 700111 HCHG RX REV CODE 636 W/ 250 OVERRIDE (IP)

## 2017-10-04 PROCEDURE — 160002 HCHG RECOVERY MINUTES (STAT)

## 2017-10-04 RX ORDER — MIDAZOLAM HYDROCHLORIDE 1 MG/ML
INJECTION INTRAMUSCULAR; INTRAVENOUS
Status: COMPLETED
Start: 2017-10-04 | End: 2017-10-04

## 2017-10-04 RX ORDER — IODIXANOL 270 MG/ML
28 INJECTION, SOLUTION INTRAVASCULAR ONCE
Status: COMPLETED | OUTPATIENT
Start: 2017-10-04 | End: 2017-10-04

## 2017-10-04 RX ORDER — MIDAZOLAM HYDROCHLORIDE 1 MG/ML
.5-2 INJECTION INTRAMUSCULAR; INTRAVENOUS PRN
Status: DISCONTINUED | OUTPATIENT
Start: 2017-10-04 | End: 2017-10-04 | Stop reason: HOSPADM

## 2017-10-04 RX ORDER — OXYCODONE HYDROCHLORIDE 5 MG/1
2.5 TABLET ORAL
Status: CANCELLED | OUTPATIENT
Start: 2017-10-04

## 2017-10-04 RX ORDER — MIDAZOLAM HYDROCHLORIDE 1 MG/ML
.5-2 INJECTION INTRAMUSCULAR; INTRAVENOUS PRN
Status: DISCONTINUED | OUTPATIENT
Start: 2017-10-04 | End: 2017-10-04

## 2017-10-04 RX ORDER — SODIUM CHLORIDE 9 MG/ML
500 INJECTION, SOLUTION INTRAVENOUS
Status: DISCONTINUED | OUTPATIENT
Start: 2017-10-04 | End: 2017-10-04 | Stop reason: HOSPADM

## 2017-10-04 RX ADMIN — MIDAZOLAM 2 MG: 1 INJECTION INTRAMUSCULAR; INTRAVENOUS at 14:13

## 2017-10-04 RX ADMIN — FENTANYL CITRATE 25 MCG: 50 INJECTION, SOLUTION INTRAMUSCULAR; INTRAVENOUS at 14:13

## 2017-10-04 RX ADMIN — IODIXANOL 28 ML: 270 INJECTION, SOLUTION INTRAVASCULAR at 15:11

## 2017-10-04 RX ADMIN — MIDAZOLAM 2 MG: 1 INJECTION INTRAMUSCULAR; INTRAVENOUS at 14:23

## 2017-10-04 RX ADMIN — FENTANYL CITRATE 50 MCG: 50 INJECTION, SOLUTION INTRAMUSCULAR; INTRAVENOUS at 14:44

## 2017-10-04 RX ADMIN — FENTANYL CITRATE 25 MCG: 50 INJECTION, SOLUTION INTRAMUSCULAR; INTRAVENOUS at 14:22

## 2017-10-04 RX ADMIN — FENTANYL CITRATE 50 MCG: 50 INJECTION, SOLUTION INTRAMUSCULAR; INTRAVENOUS at 14:30

## 2017-10-04 RX ADMIN — FENTANYL CITRATE 25 MCG: 50 INJECTION, SOLUTION INTRAMUSCULAR; INTRAVENOUS at 14:28

## 2017-10-04 RX ADMIN — FENTANYL CITRATE 50 MCG: 50 INJECTION, SOLUTION INTRAMUSCULAR; INTRAVENOUS at 14:54

## 2017-10-04 RX ADMIN — FENTANYL CITRATE 25 MCG: 50 INJECTION, SOLUTION INTRAMUSCULAR; INTRAVENOUS at 14:24

## 2017-10-04 RX ADMIN — MIDAZOLAM 2 MG: 1 INJECTION INTRAMUSCULAR; INTRAVENOUS at 14:46

## 2017-10-04 RX ADMIN — FENTANYL CITRATE 50 MCG: 50 INJECTION, SOLUTION INTRAMUSCULAR; INTRAVENOUS at 14:56

## 2017-10-04 ASSESSMENT — PAIN SCALES - GENERAL
PAINLEVEL_OUTOF10: 0

## 2017-10-04 NOTE — PROGRESS NOTES
IR RN note:    RIGHT ARM FISTULOGRAM WITH ANGIOPLASTIES AND POSSIBLE STENTS by MD frank assisted by RT Andrea, x2 Right arm AV fistula access sites;  Right arm angioplasty    End Tidal CO2 range 22-29 during procedure    Patient tolerated procedure, hemodynamically stable; report given to RN Brain;   patient transported back to ppu via RN

## 2017-10-04 NOTE — DISCHARGE INSTRUCTIONS
ACTIVITY: Rest and take it easy for the first 24 hours.  A responsible adult is recommended to remain with you during that time.  It is normal to feel sleepy.  We encourage you to not do anything that requires balance, judgment or coordination.    MILD FLU-LIKE SYMPTOMS ARE NORMAL. YOU MAY EXPERIENCE GENERALIZED MUSCLE ACHES, THROAT IRRITATION, HEADACHE AND/OR SOME NAUSEA.    FOR 24 HOURS DO NOT:  Drive, operate machinery or run household appliances.  Drink beer or alcoholic beverages.   Make important decisions or sign legal documents.    SPECIAL INSTRUCTIONS: follow up with primary care physician as needed   Call Dr frank with any questions [phone]  If you experience chest pain, s.o.b, call [phone] return to ER  Continue with your home medications     DIET: To avoid nausea, slowly advance diet as tolerated, avoiding spicy or greasy foods for the first day.  Add more substantial food to your diet according to your physician's instructions.  Babies can be fed formula or breast milk as soon as they are hungry.  INCREASE FLUIDS AND FIBER TO AVOID CONSTIPATION.    SURGICAL DRESSING/BATHING: keep dressing clean dry intact    FOLLOW-UP APPOINTMENT:  A follow-up appointment should be arranged with your doctor in [phone]; call to schedule.    You should CALL YOUR PHYSICIAN if you develop:  Fever greater than 101 degrees F.  Pain not relieved by medication, or persistent nausea or vomiting.  Excessive bleeding (blood soaking through dressing) or unexpected drainage from the wound.  Extreme redness or swelling around the incision site, drainage of pus or foul smelling drainage.  Inability to urinate or empty your bladder within 8 hours.  Problems with breathing or chest pain.    You should call [phone] if you develop problems with breathing or chest pain.  If you are unable to contact your doctor or surgical center, you should go to the nearest emergency room or urgent care center.  Physician's telephone #: [phone]    If any  questions arise, call your doctor.  If your doctor is not available, please feel free to call the Surgical Center at [phone].  The Center is open Monday through Friday from 7AM to 7PM.  You can also call the HEALTH HOTLINE open 24 hours/day, 7 days/week and speak to a nurse at [phone], or toll free at [phone].    A registered nurse may call you a few days after your surgery to see how you are doing after your procedure.    MEDICATIONS: Resume taking daily medication.  Take prescribed pain medication with food.  If no medication is prescribed, you may take non-aspirin pain medication if needed.  PAIN MEDICATION CAN BE VERY CONSTIPATING.  Take a stool softener or laxative such as senokot, pericolace, or milk of magnesia if needed.  Fistulogram, Care After  Refer to this sheet in the next few weeks. These instructions provide you with information on caring for yourself after your procedure. Your health care provider may also give you more specific instructions. Your treatment has been planned according to current medical practices, but problems sometimes occur. Call your health care provider if you have any problems or questions after your procedure.  WHAT TO EXPECT AFTER THE PROCEDURE  After your procedure, it is typical to have the following:  · A small amount of discomfort in the area where the catheters were placed.  · A small amount of bruising around the fistula.  · Sleepiness and fatigue.  HOME CARE INSTRUCTIONS  · Rest at home for the day following your procedure.  · Do not drive or operate heavy machinery while taking pain medicine.  · Take medicines only as directed by your health care provider.  · Do not take baths, swim, or use a hot tub until your health care provider approves. You may shower 24 hours after the procedure or as directed by your health care provider.  · There are many different ways to close and cover an incision, including stitches, skin glue, and adhesive strips. Follow  your health care provider's instructions on:  ¨ Incision care.  ¨ Bandage (dressing) changes and removal.  ¨ Incision closure removal.  · Monitor your dialysis fistula carefully.  SEEK MEDICAL CARE IF:  · You have drainage, redness, swelling, or pain at your catheter site.  · You have a fever.  · You have chills.  SEEK IMMEDIATE MEDICAL CARE IF:  · You feel weak.  · You have trouble balancing.  · You have trouble moving your arms or legs.  · You have problems with your speech or vision.  · You can no longer feel a vibration or buzz when you put your fingers over your dialysis fistula.  · The limb that was used for the procedure:  ¨ Swells.  ¨ Is painful.  ¨ Is cold.  ¨ Is discolored, such as blue or pale white.     This information is not intended to replace advice given to you by your health care provider. Make sure you discuss any questions you have with your health care provider.     Document Released: 05/04/2015 Document Reviewed: 05/04/2015  [employer] Interactive Patient Education ©2016 [employer]  Fistulogram  A fistulogram is an X-ray test to look inside the site where your blood is removed and returned to your body during dialysis (dialysis fistula). Your fistula allows easy and effective access, but sometimes problems can occur, such as narrowing or blockages. A narrowing or blockage can reduce the effectiveness of dialysis. A fistulogram helps to detect these problems. It also lets your health care provider know of any additional treatment you may need.   LET YOUR HEALTH CARE PROVIDER KNOW ABOUT:  · Any allergies you have.     · All medicines you are taking, including vitamins, herbs, eye drops, creams, and over-the-counter medicines.  · Previous problems you or members of your family have had with the use of anesthetics.  · Any reactions you have had to contrast dye.  · Any blood disorders you have.  · Previous surgeries you have had.  · Medical conditions you have.  · Pain, redness, or swelling in your  limb with the dialysis fistula.  · Any bleeding from your dialysis fistula.  · Any of the following in the part of your body where the dialysis fistula leads:  ¨ Numbness.  ¨ Tingling.  ¨ Cold feeling.  ¨ Bluish or pale white in color.  RISKS AND COMPLICATIONS  Generally, a fistulogram is a safe procedure. However, problems can occur and include:  · Bleeding.  · Infection.  · A blood clot in the dialysis fistula.  · A blood clot that travels to your lungs (pulmonary embolism).  BEFORE THE PROCEDURE  · Your health care provider may do some blood or urine tests or both. These will help your health care provider learn how well your kidneys and liver are working and how well your blood clots.  · Ask your health care provider about:  ¨ Changing or stopping your regular medicines. This is especially important if you are taking diabetes medicines or blood thinners.  ¨ Taking medicines such as aspirin and ibuprofen. These medicines can thin your blood. Do not take these medicines before your procedure if your health care provider asks you not to.  · Do not eat or drink anything after midnight on the night before the procedure or as directed by your health care provider.  · Ask your health care provider if you will be able to go home after the procedure. Some people stay overnight in the hospital.  · Plan to have someone take you home after the procedure.  PROCEDURE   · A needle will be inserted into your arm. It will be used to give you medicine. Medicines given may include:  ¨ Numbing medicine (local anesthetic) to numb an area on the limb with your fistula.  ¨ A medicine to help you relax (sedative).  ¨ A medicine that makes you fall asleep (general anesthetic).  · When your skin is numb, a needle will be inserted into your vein.  · A thin, flexible tube (catheter) will be inserted into the needle and guided to the narrowed area.  · Dye (contrast material) will be injected into the catheter. As the contrast material  passes through your body, you may feel warm.  · X-rays will be taken. The contrast material will show where any narrowing or blockages are.  · A guide wire and balloon-tipped catheter will be advanced to the blockage site.  · The balloon will be inflated for a short period of time. The balloon may be inflated several times at this site, or the balloon may be moved to other sites.  · If your health care provider determines the clot will be best treated by a clot-dissolving medicine (thrombolysis), this medicine can be delivered through the catheter instead of using the balloon.  · A mechanical device at the end of the catheter can also be used to break up the clot (thrombectomy).  · At the end of the procedure, the catheter will be removed.  · In some cases, the catheter site will be closed with a stitch or two.  · Pressure will be applied to stop any bleeding, and your skin will be covered with a bandage (dressing).  AFTER THE PROCEDURE  · You will stay in a recovery area until the medicines have worn off.  · You will stay in bed for several hours.  · As you begin to feel better, you will be offered ice and beverages. You may be given food.  · Your health care provider will check your blood pressure and pulse and watch your access site.  · When you can walk, drink, eat, and use the bathroom, you may be discharged.     This information is not intended to replace advice given to you by your health care provider. Make sure you discuss any questions you have with your health care provider.     Document Released: 05/04/2015 Document Reviewed: 05/04/2015  [employer] Interactive Patient Education ©2016 [employer]      If your physician has prescribed pain medication that includes Acetaminophen (Tylenol), do not take additional Acetaminophen (Tylenol) while taking the prescribed medication.    Depression / Suicide Risk    As you are discharged from this [facility] facility, it is important to learn how to keep safe from  harming yourself.    Recognize the warning signs:  · Abrupt changes in personality, positive or negative- including increase in energy   · Giving away possessions  · Change in eating patterns- significant weight changes-  positive or negative  · Change in sleeping patterns- unable to sleep or sleeping all the time   · Unwillingness or inability to communicate  · Depression  · Unusual sadness, discouragement and loneliness  · Talk of wanting to die  · Neglect of personal appearance   · Rebelliousness- reckless behavior  · Withdrawal from people/activities they love  · Confusion- inability to concentrate     If you or a loved one observes any of these behaviors or has concerns about self-harm, here's what you can do:  · Talk about it- your feelings and reasons for harming yourself  · Remove any means that you might use to hurt yourself (examples: pills, rope, extension cords, firearm)  · Get professional help from the community (Mental Health, Substance Abuse, psychological counseling)  · Do not be alone:Call your Safe Contact- someone whom you trust who will be there for you.  · Call your local CRISIS HOTLINE 492-2833 or 670-919-9388  · Call your local Children's Mobile Crisis Response Team [place] (745) 536-8222 or www.[facility]  · Call the toll free National Suicide Prevention Hotlines   · National Suicide Prevention Lifeline 175-964-FXKM (8957)  · National Hope Line Network 800-SUICIDE (939-2890)

## 2017-10-04 NOTE — OR NURSING
1520 patient arrived from IR lab s/p fistulogram alexei dressing clean dry intact, patient aaox4, denies pain, s.o.b, n/v plan of care discussed with patient.  1535 patient tolerating oral fluids   1555 discharge instructions given to patient, patient and family verbalize understanding of the orders, iv discontinued tip intact, prior to dc vss, no c/o cp, s.o.b, alexei dressing cdi

## 2017-10-04 NOTE — OP REPORT
DATE OF SERVICE:  10/04/2017    PREOPERATIVE DIAGNOSES:  Stage V kidney disease with pressurized right   brachiocephalic arteriovenous fistula.    POSTOPERATIVE DIAGNOSES:  Stage V kidney disease with pressurized right   brachiocephalic arteriovenous fistula.    OPERATIONS PERFORMED:  1.  Antegrade and retrograde sheath placements under ultrasound guidance with   image recorded.  2.  Fistulograms.  3.  Balloon angioplasty, high grade stenosis at the cephalic arch with a   Kenefic 8x40 mm balloon.  4.  Balloon angioplasty, distal arm AV fistula segment with a 6x40 mm   Kenefic balloon with good result.    SURGEON:  Erwin Livingston MD    ANESTHESIA:  Moderate conscious sedation.    FINDINGS AND PROCEDURE:  The patient has a narrowed proximal segment that is   8-9 cm long.  It has been narrowed for some time and the generalized narrowing   seems worse than previously.  Therefore, I elected to treat it today.  The   main flow restrictive lesion is a pinhole stenosis at the cephalic arch, which   has been there before.  The images from this intervention look better than   the prior one.  Stent was not placed, but possibly will need to be some time   in the future depending recurrence rate.    After obtaining informed consent, the patient was positioned supine on the   procedure table.  A time-out was performed.  Moderate sedation was initiated   and she remained stable with good oximetry and normal vital signs.  In the   distal arm region in the large diameter segment, I placed a micropuncture   needle, wire, and dilator followed by a 6-Martiniquais sheath.  Images showed a   pinhole stenosis at the cephalic arch.  I had to manipulate the angled   Glidewire to get through this.  After several tries, I was able to cross this   lesion and pass the wire down into the right atrium.  An 8 mm Conquest balloon   was inflated to 18 atmospheres to achieve dilatation of the lesion.  The   postdilatation image was taken and shows it  to be a long smooth channel with a   reasonable lumen that is smaller than the dilated segments.  The residual   stenosis that I left previously was much greater than the current finding,   which I believe is very satisfactory.  We also dilated in the mid biceps   region where there is relative narrowing using the same balloon.  Next, I   placed a second sheath, this one from the proximal arm directed toward the   antecubital area.  This was done under ultrasound guidance and local   anesthesia.  A Glidewire was passed through the artery and into the brachial   artery.  A 6 mm Conquest was inflated across this lesion with 2 inflations to   10 atmospheres.  The completion imaging showed a very good result.  There was   an excellent thrill and brisk flow through the fistula with low pressure at   the completion.  Pressure was held and hemostasis was achieved at both sheath   site dressings applied.  Blood loss was less than 50 mL.  The patient was   taken to the observation area for discharge home.       ____________________________________     MD TREASURE Medina / MADHU    DD:  10/04/2017 15:13:07  DT:  10/04/2017 16:11:44    D#:  [id number]  Job#:  [id number]

## 2017-11-08 ENCOUNTER — OFFICE VISIT (OUTPATIENT)
Dept: URGENT CARE | Facility: PHYSICIAN GROUP | Age: 22
End: 2017-11-08
Payer: COMMERCIAL

## 2017-11-08 VITALS
WEIGHT: 136 LBS | OXYGEN SATURATION: 95 % | BODY MASS INDEX: 21.35 KG/M2 | SYSTOLIC BLOOD PRESSURE: 102 MMHG | TEMPERATURE: 97.7 F | HEART RATE: 106 BPM | RESPIRATION RATE: 16 BRPM | HEIGHT: 67 IN | DIASTOLIC BLOOD PRESSURE: 60 MMHG

## 2017-11-08 DIAGNOSIS — H00.012 HORDEOLUM EXTERNUM OF RIGHT LOWER EYELID: ICD-10-CM

## 2017-11-08 DIAGNOSIS — N18.6 ESRD (END STAGE RENAL DISEASE) ON DIALYSIS (HCC): ICD-10-CM

## 2017-11-08 DIAGNOSIS — R05.9 COUGH: ICD-10-CM

## 2017-11-08 DIAGNOSIS — Z99.2 ESRD (END STAGE RENAL DISEASE) ON DIALYSIS (HCC): ICD-10-CM

## 2017-11-08 DIAGNOSIS — L08.9 SKIN INFECTION: ICD-10-CM

## 2017-11-08 PROCEDURE — 99214 OFFICE O/P EST MOD 30 MIN: CPT | Performed by: PHYSICIAN ASSISTANT

## 2017-11-08 RX ORDER — CALCITRIOL 0.5 UG/1
0.5 CAPSULE, LIQUID FILLED ORAL
COMMUNITY
End: 2017-11-10

## 2017-11-08 RX ORDER — ATENOLOL 50 MG/1
50 TABLET ORAL
COMMUNITY
End: 2017-11-10

## 2017-11-08 RX ORDER — PREDNISONE 5 MG/1
5 TABLET ORAL
COMMUNITY
End: 2017-11-10

## 2017-11-08 RX ORDER — CLINDAMYCIN HYDROCHLORIDE 300 MG/1
300 CAPSULE ORAL 3 TIMES DAILY
Qty: 15 CAP | Refills: 0 | Status: SHIPPED | OUTPATIENT
Start: 2017-11-08 | End: 2017-11-10

## 2017-11-10 ENCOUNTER — HOSPITAL ENCOUNTER (EMERGENCY)
Facility: MEDICAL CENTER | Age: 22
End: 2017-11-10
Attending: EMERGENCY MEDICINE
Payer: COMMERCIAL

## 2017-11-10 VITALS
HEIGHT: 67 IN | WEIGHT: 138.89 LBS | SYSTOLIC BLOOD PRESSURE: 106 MMHG | OXYGEN SATURATION: 94 % | RESPIRATION RATE: 16 BRPM | DIASTOLIC BLOOD PRESSURE: 67 MMHG | TEMPERATURE: 98.1 F | BODY MASS INDEX: 21.8 KG/M2 | HEART RATE: 98 BPM

## 2017-11-10 DIAGNOSIS — H00.012 HORDEOLUM EXTERNUM OF RIGHT LOWER EYELID: ICD-10-CM

## 2017-11-10 PROCEDURE — 99283 EMERGENCY DEPT VISIT LOW MDM: CPT

## 2017-11-10 RX ORDER — MYCOPHENOLIC ACID 360 MG/1
360 TABLET, DELAYED RELEASE ORAL EVERY EVENING
Status: ON HOLD | COMMUNITY
End: 2020-07-02 | Stop reason: SDUPTHER

## 2017-11-10 RX ORDER — ERYTHROMYCIN 5 MG/G
1 OINTMENT OPHTHALMIC 3 TIMES DAILY
Qty: 1 TUBE | Refills: 0 | Status: SHIPPED | OUTPATIENT
Start: 2017-11-10 | End: 2017-11-17

## 2017-11-10 RX ORDER — CLINDAMYCIN HYDROCHLORIDE 300 MG/1
300 CAPSULE ORAL 3 TIMES DAILY
Status: SHIPPED | COMMUNITY
Start: 2017-11-08 | End: 2018-01-05

## 2017-11-10 ASSESSMENT — ENCOUNTER SYMPTOMS
HEADACHES: 0
EYE PAIN: 0
FEVER: 0
MYALGIAS: 0
VOMITING: 0
TINGLING: 0
WHEEZING: 0
SORE THROAT: 0
DIARRHEA: 0
NECK PAIN: 0
CHILLS: 0
DIZZINESS: 0
COUGH: 1
EYE REDNESS: 0
EYE DISCHARGE: 0
SPUTUM PRODUCTION: 0
SHORTNESS OF BREATH: 0
ABDOMINAL PAIN: 0

## 2017-11-10 NOTE — ED NOTES
Med Rec completed per patient  Allergies reviewed    Patient taking Clindamycin for 5 days starting 11-8-17 for her eye

## 2017-11-10 NOTE — ED PROVIDER NOTES
ED Provider Note    CHIEF COMPLAINT  Chief Complaint   Patient presents with   • Eye Swelling     Right       HPI  Lily Nicole is a 21 y.o. female who presents Red right lower lid swelling. The patient's had this for about a week. She went to an urgent care they placed her on an antibiotic. He seemed to be getting better for a while but now she is getting more swollen. No eye pain, no drainage from her eye at all. It is a little bit itchy.. No vomiting or diarrhea. The patient is a dialysis patient on Monday Wednesday and Friday for renal failure.   K no visual disturbances at all.    REVIEW OF SYSTEMS  CONSTITUTIONAL:  Denies fever, chills, w  EYES:  Denies photophobia or discharge . Positive right lower lid eye swelling.  ENT:  Denies sore throat, nose or ear pain  CARDIOVASCULAR:  Denies chest pain  RESPIRATORY:  Denies cough     SKIN: Right lower lid with  swelling.  ALLERGIC: Positive AG spot to the right lower lid.  NEUROLOGIC:  Denies headache    PAST MEDICAL HISTORY  Past Medical History:   Diagnosis Date   • Dialysis patient (CMS-HCC)     pt gets dialysis M,W,F [facility]   • Hemorrhagic disorder (CMS-HCC)     nose bleeds   • Hypertension    • Infectious disease     pt had the flu on 2/20/17   • Lupus    • Renal disorder    • Seizure (CMS-HCC) 2013    from high blood pressure       FAMILY HISTORY  No family history on file.    SOCIAL HISTORY   reports that she has never smoked. She has never used smokeless tobacco. She reports that she does not drink alcohol or use drugs.    SURGICAL HISTORY  Past Surgical History:   Procedure Laterality Date   • ULI BY LAPAROSCOPY  4/5/2010    Performed by SYED MARTELL at SURGERY [facility]   • AV FISTULA CREATION     • CHOLECYSTECTOMY     • OTHER      renal biopsy x 3   • OTHER      bone marrow biopsy       CURRENT MEDICATIONS  Home Medications     Reviewed by Shefali Richey (Pharmacy Tech) on 11/10/17 at 1100  Med List Status: Not Addressed  "  Medication Last Dose Status   atenolol (TENORMIN) 25 MG Tab 11/9/2017 Active   calcitRIOL (ROCALTROL) 0.5 MCG Cap  Active   clindamycin (CLEOCIN) 300 MG Cap  Active   hydroxychloroquine (PLAQUENIL) 200 MG Tab 10/3/2017 Active   predniSONE (DELTASONE) 5 MG Tab 10/3/2017 Active   predniSONE (DELTASONE) 5 MG Tab  Active                ALLERGIES  Allergies   Allergen Reactions   • Metoprolol Nausea       PHYSICAL EXAM  VITAL SIGNS: /67   Pulse 98   Temp 36.7 °C (98.1 °F)   Resp 16   Ht 1.702 m (5' 7\")   Wt 63 kg (138 lb 14.2 oz)   LMP 11/10/2017 (Exact Date)   SpO2 94%   BMI 21.75 kg/m²      Constitutional: Patient is awake alert person place and time. No acute respiratory distress Well developed, Well nourished, Non-toxic appearance.   HENT: Normocephalic, Atraumatic  Eyes: PERRLA, EOMI, Sclera and conjunctiva clear, No discharge. The right lower lid is swollen mildly. Has some redness to it. Appears to be a stye or hordeolum. No drainage no conjunctivitis. No facial cellulitis.  Neck:  Supple no nuchal rigidity, no thyromegaly or mass. Non-tender  Lymphatic: No supraclavicular  Skin: Right lower lid with a small stye to it. Not obvious fluctuance at this time.  Extremities: No edema.  Non tender. Right upper arm has a shunt in it. There is a good thrill.  Musculoskeletal: Good range of motion upper and lower extremities.   Neurologic: Alert & oriented.  Patient ambulates without difficulty.      COURSE & MEDICAL DECISION MAKING  Pertinent Labs & Imaging studies reviewed. (See chart for details)  Patient who has a stye in her right lower lid. He seemingly getting better for a while. However now maybe the progression has not gotten any better for the last 24 hours. Maybe a little worse. I suspect that she is on the oral antibiotics and she's not getting good blood level due to her dialysis. This time we'll start her on to erythromycin ophthalmic ointment 3 times a day as well which should help quite a " bit. She also uses warm compresses. This time she is stable for discharge home for close follow-up as an outpatient. She does state that she has an ophthalmologist that she can see on Monday. She did try calling her primary care doctors and they had no appointments for the next couple of weeks. I explained to her she needs to call them again for close follow-up as an outpatient on Monday and she needs to advise them that she's already been to the urgent care in the emergency room for visits as well.    Stable for discharge    FINAL IMPRESSION  1. Right lower lid stye  2. Renal failure  3. Dialysis patient     PLAN  1. Continue the oral antibiotics  2. Stye information sheet  3. Erythromycin ointment 3 times a day for 7 days  4. Follow up with her ophthalmologist and primary care doctors Monday   5. Return to the emergency department for increased pains, fevers, vomiting or change in condition.  Electronically signed by: Mikey Aguero, 11/10/2017 11:00 AM

## 2017-11-10 NOTE — ED NOTES
DC instructions and prescription x1 given to pt. Verbalized understanding. Pt steady on feet with 0 s/s distress noted. Pt dcd home with mom.

## 2017-11-10 NOTE — PROGRESS NOTES
"Subjective:      Lily Nicole is a 21 y.o. female who presents with Eye Problem (x3days R eye swelling painful) and Cough (x4wks)            Pt is 20 y/o female who presents with worsening \"acne\" spreading on her face for the last 2 weeks. She reports that it started off on both sides of her cheek and OTC meds were helping a little on the left side. However a few days ago she developed a sore on the bottom of her right lower eyelid. She denies any eye pain, change to vision, FB sensation, or eye redness. She denies any drainage from the area.   She also reports intermittent cough for the last few weeks. She reports that the cough was getting better after she was seen in October and then the dry cough returned. She is not really taking any for the cough- she was written tessalon in the past that did not help as much. Of note pt. Has hx of ESRD with Lupus. She has dialysis 3/week without problems.         Review of Systems   Constitutional: Positive for malaise/fatigue. Negative for chills and fever.   HENT: Negative for congestion, ear discharge, ear pain and sore throat.    Eyes: Negative for pain, discharge and redness.        Pos lower eyelid pain     Respiratory: Positive for cough. Negative for sputum production, shortness of breath and wheezing.    Cardiovascular: Negative for chest pain and leg swelling.   Gastrointestinal: Negative for abdominal pain, diarrhea and vomiting.   Genitourinary: Negative for dysuria and urgency.   Musculoskeletal: Negative for myalgias and neck pain.   Skin: Negative for itching and rash.   Neurological: Negative for dizziness, tingling and headaches.          Objective:     /60   Pulse (!) 106   Temp 36.5 °C (97.7 °F)   Resp 16   Ht 1.702 m (5' 7\")   Wt 61.7 kg (136 lb)   SpO2 95%   BMI 21.30 kg/m²    PMH:  has a past medical history of Dialysis patient (CMS-HCC); Hemorrhagic disorder (CMS-HCC); Hypertension; Infectious disease; Lupus; Renal disorder; and " Seizure (CMS-HCC) (2013).  MEDS:   Current Outpatient Prescriptions:   •  clindamycin (CLEOCIN) 300 MG Cap, Take 1 Cap by mouth 3 times a day for 5 days., Disp: 15 Cap, Rfl: 0  •  atenolol (TENORMIN) 50 MG Tab, Take 50 mg by mouth., Disp: , Rfl:   •  predniSONE (DELTASONE) 5 MG Tab, Take 5 mg by mouth., Disp: , Rfl:   •  calcitRIOL (ROCALTROL) 0.5 MCG Cap, Take 0.5 mcg by mouth., Disp: , Rfl:   •  hydroxychloroquine (PLAQUENIL) 200 MG Tab, Take 1 Tab by mouth every bedtime., Disp: 30 Tab, Rfl: 1  •  atenolol (TENORMIN) 25 MG Tab, Take 25 mg by mouth every bedtime., Disp: , Rfl:   •  predniSONE (DELTASONE) 5 MG Tab, Take 5 mg by mouth every bedtime., Disp: , Rfl:   ALLERGIES:   Allergies   Allergen Reactions   • Metoprolol Nausea     SURGHX:   Past Surgical History:   Procedure Laterality Date   • ULI BY LAPAROSCOPY  4/5/2010    Performed by SYED MARTELL at SURGERY [facility] ORS   • AV FISTULA CREATION     • CHOLECYSTECTOMY     • OTHER      renal biopsy x 3   • OTHER      bone marrow biopsy     SOCHX:  reports that she has never smoked. She has never used smokeless tobacco. She reports that she does not drink alcohol or use drugs.  FH: Family history was reviewed, no pertinent findings to report    Physical Exam   Constitutional: She is oriented to person, place, and time. She appears well-developed and well-nourished.   HENT:   Head: Normocephalic and atraumatic.       Mouth/Throat: No oropharyngeal exudate.   Ears- Canals clear- TM- with clear fluid effusions bilaterally.   Pos. PND, with slight erythema- without tonsillar edema or exudate.   Mild discharge noted bilaterally- to nares.   Scattered pustules and papules to bilateral cheeks worse on the right- with noted hordeolum on the right lower lid- mid tenderness.    Eyes: Conjunctivae and EOM are normal. Pupils are equal, round, and reactive to light. Lids are everted and swept, no foreign bodies found. Right eye exhibits hordeolum. Right eye exhibits no  discharge and no exudate. No foreign body present in the right eye. Left eye exhibits no discharge and no exudate. No foreign body present in the left eye. Right conjunctiva is not injected. Left conjunctiva is not injected. Right eye exhibits normal extraocular motion. Left eye exhibits normal extraocular motion.       Neck: Normal range of motion. Neck supple.   Cardiovascular: Normal rate and regular rhythm.    No murmur heard.  Pulmonary/Chest: Effort normal and breath sounds normal. No respiratory distress.   Musculoskeletal: Normal range of motion. She exhibits no tenderness.   Lymphadenopathy:     She has no cervical adenopathy.   Neurological: She is alert and oriented to person, place, and time.   Skin: Skin is warm. No rash noted.   Psychiatric: She has a normal mood and affect. Her behavior is normal.   Vitals reviewed.              Assessment/Plan:     1. Skin infection  - clindamycin (CLEOCIN) 300 MG Cap; Take 1 Cap by mouth 3 times a day for 5 days.  Dispense: 15 Cap; Refill: 0    2. ESRD (end stage renal disease) on dialysis (CMS-[facility])  3. Cough  4. Hordeolum externum of right lower eyelid    Pt. Reported hx of acne, however it appears that this is more pustule than in the past for this patient- will cover with Clindamycin warm hot compresses for the Stye. Light message.   Also I suspect post viral cough at this time and encouraged supportive therapies to help- humidification, avoid dairy. Continue with tessalon.   Patient given precautionary s/sx that mandate immediate follow up and evaluation in the ED. Advised of risks of not doing so.    DDX, Supportive care, and indications for immediate follow-up discussed with patient.    Instructed to return to clinic or nearest emergency department if we are not available for any change in condition, further concerns, or worsening of symptoms.    The patient demonstrated a good understanding and agreed with the treatment plan.

## 2017-11-11 ENCOUNTER — PATIENT OUTREACH (OUTPATIENT)
Dept: HEALTH INFORMATION MANAGEMENT | Facility: OTHER | Age: 22
End: 2017-11-11

## 2017-11-11 NOTE — PROGRESS NOTES
11/11/2017 0848 - Discharge Outreach attempt -   11/11/2017 1153 - Patient returned call - call completed.

## 2017-11-12 ENCOUNTER — HOSPITAL ENCOUNTER (EMERGENCY)
Facility: MEDICAL CENTER | Age: 22
End: 2017-11-12
Attending: EMERGENCY MEDICINE
Payer: COMMERCIAL

## 2017-11-12 VITALS
BODY MASS INDEX: 21.97 KG/M2 | WEIGHT: 139.99 LBS | SYSTOLIC BLOOD PRESSURE: 120 MMHG | TEMPERATURE: 97.3 F | HEART RATE: 85 BPM | RESPIRATION RATE: 16 BRPM | DIASTOLIC BLOOD PRESSURE: 83 MMHG | OXYGEN SATURATION: 96 % | HEIGHT: 67 IN

## 2017-11-12 DIAGNOSIS — H00.012 HORDEOLUM EXTERNUM OF RIGHT LOWER EYELID: ICD-10-CM

## 2017-11-12 PROCEDURE — 96372 THER/PROPH/DIAG INJ SC/IM: CPT

## 2017-11-12 PROCEDURE — 700111 HCHG RX REV CODE 636 W/ 250 OVERRIDE (IP): Performed by: EMERGENCY MEDICINE

## 2017-11-12 PROCEDURE — 99283 EMERGENCY DEPT VISIT LOW MDM: CPT

## 2017-11-12 RX ORDER — CEPHALEXIN 500 MG/1
500 CAPSULE ORAL 2 TIMES DAILY
Qty: 14 CAP | Refills: 0 | Status: SHIPPED | OUTPATIENT
Start: 2017-11-12 | End: 2018-01-05

## 2017-11-12 RX ORDER — CEFTRIAXONE 1 G/1
1000 INJECTION, POWDER, FOR SOLUTION INTRAMUSCULAR; INTRAVENOUS ONCE
Status: COMPLETED | OUTPATIENT
Start: 2017-11-12 | End: 2017-11-12

## 2017-11-12 RX ADMIN — CEFTRIAXONE 1000 MG: 1 INJECTION, POWDER, FOR SOLUTION INTRAMUSCULAR; INTRAVENOUS at 17:28

## 2017-11-13 ENCOUNTER — PATIENT OUTREACH (OUTPATIENT)
Dept: HEALTH INFORMATION MANAGEMENT | Facility: OTHER | Age: 22
End: 2017-11-13

## 2017-11-13 NOTE — ED PROVIDER NOTES
"ED Provider Note    CHIEF COMPLAINT  Chief Complaint   Patient presents with   • Eye Pain     Pt c/o bump and swelling to right eye. Pt states was seen here on Friday for same pt was told it was a stye and was given cream that has not helped.        HPI  Lily Nicole is a 21 y.o. female who presents with a chief complaint of right eye swelling. She developed a pink \"ball\" on her right lower lid approximately 2 weeks ago that was initially itchy. She thought it was a pimple but it continued to grow and approximately 5 days ago she visited urgent care where she was prescribed clindamycin. She has been taking it as directed but the area continued to worsen. She return to this ER on Friday and was prescribed erythromycin ointment that she has been using along with warm compresses. Despite this, the area continues to worsen. The area is painful. She denies any drainage from the area and denies fevers.    REVIEW OF SYSTEMS  See HPI for further details. Right eye swelling, erythema, and pain. All other systems are negative.     PAST MEDICAL HISTORY   has a past medical history of Dialysis patient (CMS-HCC); Hemorrhagic disorder (CMS-HCC); Hypertension; Infectious disease; Lupus; Renal disorder; and Seizure (CMS-HCC) (2013).    SOCIAL HISTORY  Social History     Social History Main Topics   • Smoking status: Never Smoker   • Smokeless tobacco: Never Used   • Alcohol use No   • Drug use: No   • Sexual activity: Not on file       SURGICAL HISTORY   has a past surgical history that includes ronak by laparoscopy (4/5/2010); other; other; av fistula creation; and cholecystectomy.    CURRENT MEDICATIONS  Home Medications    **Home medications have not yet been reviewed for this encounter**         ALLERGIES  Allergies   Allergen Reactions   • Metoprolol Nausea       PHYSICAL EXAM  VITAL SIGNS: /83   Pulse 87   Temp 36.3 °C (97.3 °F)   Resp 18   Ht 1.702 m (5' 7\")   Wt 63.5 kg (139 lb 15.9 oz)   LMP 11/10/2017 " (Exact Date)   SpO2 96%   BMI 21.93 kg/m²    Pulse ox interpretation: I interpret this pulse ox as normal.  Constitutional: Alert in no apparent distress.  HENT: Normocephalic, atraumatic, bilateral external ears normal. Mucous membranes Moist. Nose normal.   Eyes: Pupils are equal and reactive. Extraocular muscles are intact. Conjunctiva normal, non-icteric. There is a large, erythematous swelling over the left eyelid encompassing the lid margin with associated tenderness to palpation in what appears to be nondraining purulent material.  Heart: Regular rate and rythm, no murmurs.    Lungs: Clear to auscultation bilaterally.  Skin: Warm, dry, no erythema, no rash.   Neurologic: Alert, grossly non-focal.   Psychiatric: Affect normal, judgment normal, mood normal, appears appropriate and not intoxicated.       COURSE & MEDICAL DECISION MAKING  Pertinent Labs & Imaging studies reviewed. (See chart for details)  This is a 21-year-old female on hemodialysis for ESRD due to lupus who is here with an enlarging right lower lid stye over the course of the past 2 weeks. She has full extraocular muscle movement, her right pupil is equal to the left and reactive. She does not have circumferential periorbital erythema or edema to suggest an orbital or periorbital cellulitis. She denies any systemic symptoms to suggest a systemic infection. Specifically, she has not had any fevers, chills, nausea or vomiting, abdominal pain, diarrhea or constipation. She appears well here, her vital signs are normal. Her physical exam is remarkable for a very large stye/swelling to the right lower lid with what appears to be an area of fluctuance filled with purulent material. The area encompasses her lower lid margin and I feel uncomfortable incising it here in the ER due to potential canalicular damage/cosmetic deformity. I discussed the patient with Dr. Vides, ophthalmology, and he suggests starting the patient on Keflex and outpatient  follow-up with him tomorrow morning. The patient was given 1 dose of IM ceftriaxone in the ER. I discussed Keflex dosing with our on-call pharmacist who suggests twice a day dosing instead of 4 times a day dosing. The patient was subsequently discharged in stable condition with strict return precautions, prescription for Keflex 500 mg twice a day, and instructions to follow-up with Dr. Vides tomorrow morning at 9 AM at his office. The patient does have dialysis tomorrow morning prior to 9 AM but she reports that she will go directly to Dr. Hernandez's office after dialysis. I have asked her to return to the ER at [facility] if things worsen before 9 AM tomorrow morning.    The patient will not drink alcohol nor drive with prescribed medications. The patient will return for worsening symptoms and is stable at the time of discharge. The patient verbalizes understanding and will comply.    FINAL IMPRESSION  1. Stye    Electronically signed by: Christopher Hussein, 11/12/2017 4:56 PM

## 2017-11-13 NOTE — ED NOTES
Discharged to home with instructions to follow up with doctor.  Patient has appt at 0800 tomorrow with opthomologist

## 2017-11-13 NOTE — DISCHARGE INSTRUCTIONS
You were seen in the ER for right eyelid swelling. I've spoken with our on-call ophthalmologist who will see you when his office tomorrow morning at 8:00 AM. We have given you a dose of antibiotics intramuscularly here today and I have given you a prescription for additional antibiotics to take by mouth once discharged. It is very important that you follow-up at his office tomorrow morning as directed. You do not need to call his office prior to arrival. If you develop new or worsening symptoms prior to 8 AM tomorrow morning please return to the ER. I would also like you to follow up with your primary care physician this week for a recheck.    Stye  A stye is a bump on your eyelid caused by a bacterial infection. A stye can form inside the eyelid (internal stye) or outside the eyelid (external stye). An internal stye may be caused by an infected oil-producing gland inside your eyelid. An external stye may be caused by an infection at the base of your eyelash (hair follicle).  Styes are very common. Anyone can get them at any age. They usually occur in just one eye, but you may have more than one in either eye.   CAUSES   The infection is almost always caused by bacteria called Staphylococcus aureus. This is a common type of bacteria that lives on your skin.  RISK FACTORS  You may be at higher risk for a stye if you have had one before. You may also be at higher risk if you have:  · Diabetes.  · Long-term illness.  · Long-term eye redness.  · A skin condition called seborrhea.  · High fat levels in your blood (lipids).  SIGNS AND SYMPTOMS   Eyelid pain is the most common symptom of a stye. Internal styes are more painful than external styes. Other signs and symptoms may include:  · Painful swelling of your eyelid.  · A scratchy feeling in your eye.  · Tearing and redness of your eye.  · Pus draining from the stye.  DIAGNOSIS   Your health care provider may be able to diagnose a stye just by examining your eye. The  health care provider may also check to make sure:  · You do not have a fever or other signs of a more serious infection.  · The infection has not spread to other parts of your eye or areas around your eye.  TREATMENT   Most styes will clear up in a few days without treatment. In some cases, you may need to use antibiotic drops or ointment to prevent infection. Your health care provider may have to drain the stye surgically if your stye is:  · Large.  · Causing a lot of pain.  · Interfering with your vision.  This can be done using a thin blade or a needle.   HOME CARE INSTRUCTIONS   · Take medicines only as directed by your health care provider.  · Apply a clean, warm compress to your eye for 10 minutes, 4 times a day.  · Do not wear contact lenses or eye makeup until your stye has healed.  · Do not try to pop or drain the stye.  SEEK MEDICAL CARE IF:  · You have chills or a fever.  · Your stye does not go away after several days.  · Your stye affects your vision.  · Your eyeball becomes swollen, red, or painful.  MAKE SURE YOU:  · Understand these instructions.  · Will watch your condition.  · Will get help right away if you are not doing well or get worse.     This information is not intended to replace advice given to you by your health care provider. Make sure you discuss any questions you have with your health care provider.     Document Released: 09/27/2006 Document Revised: 01/08/2016 Document Reviewed: 04/03/2015  [employer] Interactive Patient Education ©2016 [employer]

## 2017-11-13 NOTE — ED NOTES
"Chief Complaint   Patient presents with   • Eye Pain     Pt c/o bump and swelling to right eye. Pt states was seen here on Friday for same pt was told it was a stye and was given cream that has not helped.      /83   Pulse 87   Temp 36.3 °C (97.3 °F)   Resp 18   Ht 1.702 m (5' 7\")   Wt 63.5 kg (139 lb 15.9 oz)   LMP 11/10/2017 (Exact Date)   SpO2 96%   BMI 21.93 kg/m²     "

## 2017-11-21 ENCOUNTER — OFFICE VISIT (OUTPATIENT)
Dept: URGENT CARE | Facility: PHYSICIAN GROUP | Age: 22
End: 2017-11-21
Payer: COMMERCIAL

## 2017-11-21 VITALS
OXYGEN SATURATION: 97 % | BODY MASS INDEX: 21.8 KG/M2 | DIASTOLIC BLOOD PRESSURE: 68 MMHG | HEIGHT: 67 IN | RESPIRATION RATE: 16 BRPM | WEIGHT: 138.89 LBS | HEART RATE: 88 BPM | SYSTOLIC BLOOD PRESSURE: 106 MMHG | TEMPERATURE: 98 F

## 2017-11-21 DIAGNOSIS — B09 VIRAL EXANTHEM: ICD-10-CM

## 2017-11-21 DIAGNOSIS — M32.14 LUPUS NEPHRITIS (HCC): ICD-10-CM

## 2017-11-21 PROCEDURE — 99213 OFFICE O/P EST LOW 20 MIN: CPT | Performed by: FAMILY MEDICINE

## 2017-11-21 RX ORDER — HYDROXYCHLOROQUINE SULFATE 200 MG/1
200 TABLET, FILM COATED ORAL
Qty: 30 TAB | Refills: 1 | Status: SHIPPED | OUTPATIENT
Start: 2017-11-21 | End: 2017-11-22

## 2017-11-21 ASSESSMENT — PAIN SCALES - GENERAL: PAINLEVEL: NO PAIN

## 2017-11-22 ENCOUNTER — HOSPITAL ENCOUNTER (EMERGENCY)
Facility: MEDICAL CENTER | Age: 22
End: 2017-11-22
Attending: EMERGENCY MEDICINE
Payer: COMMERCIAL

## 2017-11-22 VITALS
WEIGHT: 140.21 LBS | HEART RATE: 107 BPM | OXYGEN SATURATION: 98 % | BODY MASS INDEX: 22.01 KG/M2 | SYSTOLIC BLOOD PRESSURE: 115 MMHG | RESPIRATION RATE: 16 BRPM | DIASTOLIC BLOOD PRESSURE: 75 MMHG | HEIGHT: 67 IN | TEMPERATURE: 98.8 F

## 2017-11-22 DIAGNOSIS — T50.905A ADVERSE EFFECT OF DRUG, INITIAL ENCOUNTER: ICD-10-CM

## 2017-11-22 DIAGNOSIS — T78.40XA ALLERGIC REACTION, INITIAL ENCOUNTER: ICD-10-CM

## 2017-11-22 PROCEDURE — 99282 EMERGENCY DEPT VISIT SF MDM: CPT

## 2017-11-22 RX ORDER — PREDNISONE 10 MG/1
TABLET ORAL
Qty: 32 TAB | Refills: 0 | Status: SHIPPED | OUTPATIENT
Start: 2017-11-22 | End: 2018-01-17

## 2017-11-22 ASSESSMENT — PAIN SCALES - GENERAL: PAINLEVEL_OUTOF10: 0

## 2017-11-22 NOTE — ED PROVIDER NOTES
ED Provider Note  CHIEF COMPLAINT  Chief Complaint   Patient presents with   • Rash       HPI  Lilymonique Nicole is a 21 y.o. female who presentsRash. She states that she's had a rash increase in severity last 3 days. She went to an urgent care yesterday and they state that the rashes by viral in nature. The patient states she recently completed antibiotics in the form of Keflex as well as Clindamycin. The patient denies difficulty breathing, tongue swelling, shortness of breath, nausea, vomiting. She denies any history of significant allergies. She does have lupus, she is on chronic steroids, and undergoes hemodialysis Monday, Wednesday and Friday. She states that she's tried oral Benadryl as well as topical Benadryl with slight reduction in rash  REVIEW OF SYSTEMS  Pertinent positives include rash  Pertinent negatives include tongue swelling, difficulty swallowing, shortness of breath, lightheadedness, dizziness, chest pain    PAST MEDICAL HISTORY  Past Medical History:   Diagnosis Date   • Dialysis patient (CMS-HCC)     pt gets dialysis M,W,F [facility]   • Hemorrhagic disorder (CMS-HCC)     nose bleeds   • Hypertension    • Infectious disease     pt had the flu on 2/20/17   • Lupus    • Renal disorder    • Seizure (CMS-HCC) 2013    from high blood pressure       FAMILY HISTORY  No family history on file.    SOCIAL HISTORY  Social History     Social History   • Marital status: Single     Spouse name: N/A   • Number of children: N/A   • Years of education: N/A     Social History Main Topics   • Smoking status: Never Smoker   • Smokeless tobacco: Never Used   • Alcohol use No   • Drug use: No   • Sexual activity: Not on file     Other Topics Concern   • Not on file     Social History Narrative   • No narrative on file       SURGICAL HISTORY  Past Surgical History:   Procedure Laterality Date   • ULI BY LAPAROSCOPY  4/5/2010    Performed by SYED MARTELL at SURGERY [facility] ORS   • AV FISTULA CREATION     •  "CHOLECYSTECTOMY     • OTHER      renal biopsy x 3   • OTHER      bone marrow biopsy       CURRENT MEDICATIONS  Home Medications     Reviewed by Shefali Richey (Pharmacy Tech) on 11/22/17 at 1055  Med List Status: Complete   Medication Last Dose Status   atenolol (TENORMIN) 25 MG Tab 11/21/2017 Active   cephALEXin (KEFLEX) 500 MG Cap 1 WEEK Active   clindamycin (CLEOCIN) 300 MG Cap 1 WEEK Active   hydroxychloroquine (PLAQUENIL) 200 MG Tab 11/21/2017 Active   mycophenolate sodium (MYFORTIC) 360 MG Tablet Delayed Response tablet 11/21/2017 Active   predniSONE (DELTASONE) 5 MG Tab 11/9/2017 Active                ALLERGIES  Allergies   Allergen Reactions   • Metoprolol Nausea       PHYSICAL EXAM  VITAL SIGNS: /75   Pulse (!) 107   Temp 37.1 °C (98.8 °F)   Resp 16   Ht 1.702 m (5' 7\")   Wt 63.6 kg (140 lb 3.4 oz)   LMP 11/10/2017 (Exact Date)   SpO2 98%   BMI 21.96 kg/m²    Constitutional: Well developed, Well nourished, No acute distress, Non-toxic appearance.   HENT: Normocephalic, Atraumatic, Bilateral external ears normal, Oropharynx moist mucous membranes, No oral exudates, no rhinorrhea, Mallampoti 1, no lingual edema.   Eyes: PERRLA, EOMI, Conjunctiva normal, No discharge.   Thorax & Lungs:  No respiratory distress, no rales, no rhonchi, No wheezing, No chest tenderness.   Skin: Warm, urticarial rash upper diffuse throughout, no petechia, purpura, and a vesicular pattern   Extremities:  No edema, fistula right upper extremity       COURSE & MEDICAL DECISION MAKING  Pertinent Labs & Imaging studies reviewed. (See chart for details)  This is a 21-year-old female that presents with probable drug reaction and allergic reaction. The patient has no evidence of infectious etiology, no evidence of purpura or petechia. Do not believe the patient's expansion infectious etiology of her condition. Given the patient a prednisone taper dose pack and she'll be following up for further evaluation with her " primary care physician. I encouraged her return to the emergency department if she has severe symptoms such as shortness of breath, tongue swelling, nausea or vomiting, lightheadedness or dizziness    Discharge Medication List as of 11/22/2017 11:10 AM      START taking these medications    Details   !! predniSONE (DELTASONE) 10 MG Tab Take 4 tabs (40 mg) po daily on days 1-3, then take 3 tabs (30 mg) po daily on days 4-6, then take 2 tabs (20 mg) po daily on days 7-9, then take 1 tab (10 mg) po daily on days 10-12, then take 1/2 tab (5 mg) po daily on days 13-15.Disp-32 Tab, R-0       !! - Potential duplicate medications found. Please discuss with provider.            FINAL IMPRESSION     1. Allergic reaction, initial encounter    2. Adverse effect of drug, initial encounter        The patient will return for new or worsening symptoms and is stable at the time of discharge.    The patient is referred to a primary physician for blood pressure management, diabetic screening, and for all other preventative health concerns.    DISPOSITION:  Patient will be discharged home in stable condition.    FOLLOW UP:  [facility], Emergency Dept  [address]  [facility] [id number] [phone]    If symptoms worsen    ERICA Rangel  [address] [place] [id number] [phone]    Schedule an appointment as soon as possible for a visit in 1 week        OUTPATIENT MEDICATIONS:  Discharge Medication List as of 11/22/2017 11:10 AM      START taking these medications    Details   !! predniSONE (DELTASONE) 10 MG Tab Take 4 tabs (40 mg) po daily on days 1-3, then take 3 tabs (30 mg) po daily on days 4-6, then take 2 tabs (20 mg) po daily on days 7-9, then take 1 tab (10 mg) po daily on days 10-12, then take 1/2 tab (5 mg) po daily on days 13-15.Disp-32 Tab, R-0       !! - Potential duplicate medications found. Please discuss with provider.        Electronically signed by: Emery  MIGUEL Negron, 11/22/2017 11:02 AM

## 2017-11-22 NOTE — ED NOTES
Med rec complete per patient  Allergies reviewed    Patient completed 2 courses of antibiotics for her eye about a week ago

## 2017-11-23 ENCOUNTER — HOSPITAL ENCOUNTER (EMERGENCY)
Facility: MEDICAL CENTER | Age: 22
End: 2017-11-23
Attending: EMERGENCY MEDICINE
Payer: COMMERCIAL

## 2017-11-23 VITALS
WEIGHT: 139.77 LBS | BODY MASS INDEX: 21.94 KG/M2 | RESPIRATION RATE: 17 BRPM | DIASTOLIC BLOOD PRESSURE: 68 MMHG | HEART RATE: 58 BPM | TEMPERATURE: 98 F | SYSTOLIC BLOOD PRESSURE: 113 MMHG | OXYGEN SATURATION: 96 % | HEIGHT: 67 IN

## 2017-11-23 DIAGNOSIS — L50.9 URTICARIA: ICD-10-CM

## 2017-11-23 PROCEDURE — 700111 HCHG RX REV CODE 636 W/ 250 OVERRIDE (IP): Performed by: EMERGENCY MEDICINE

## 2017-11-23 PROCEDURE — 99284 EMERGENCY DEPT VISIT MOD MDM: CPT

## 2017-11-23 PROCEDURE — 96375 TX/PRO/DX INJ NEW DRUG ADDON: CPT

## 2017-11-23 PROCEDURE — 700111 HCHG RX REV CODE 636 W/ 250 OVERRIDE (IP)

## 2017-11-23 PROCEDURE — 96372 THER/PROPH/DIAG INJ SC/IM: CPT

## 2017-11-23 PROCEDURE — 96374 THER/PROPH/DIAG INJ IV PUSH: CPT

## 2017-11-23 RX ORDER — DIPHENHYDRAMINE HYDROCHLORIDE 50 MG/ML
25 INJECTION INTRAMUSCULAR; INTRAVENOUS ONCE
Status: COMPLETED | OUTPATIENT
Start: 2017-11-23 | End: 2017-11-23

## 2017-11-23 RX ORDER — EPINEPHRINE 1 MG/ML
INJECTION, SOLUTION, CONCENTRATE INTRAVENOUS
Status: COMPLETED
Start: 2017-11-23 | End: 2017-11-23

## 2017-11-23 RX ORDER — EPINEPHRINE 1 MG/ML
0.5 INJECTION INTRAMUSCULAR; INTRAVENOUS; SUBCUTANEOUS ONCE
Status: COMPLETED | OUTPATIENT
Start: 2017-11-23 | End: 2017-11-23

## 2017-11-23 RX ORDER — FAMOTIDINE 20 MG/1
20 TABLET, FILM COATED ORAL 2 TIMES DAILY
Qty: 30 TAB | Refills: 0 | Status: SHIPPED | OUTPATIENT
Start: 2017-11-23 | End: 2019-03-20

## 2017-11-23 RX ORDER — METHYLPREDNISOLONE SODIUM SUCCINATE 125 MG/2ML
125 INJECTION, POWDER, LYOPHILIZED, FOR SOLUTION INTRAMUSCULAR; INTRAVENOUS ONCE
Status: COMPLETED | OUTPATIENT
Start: 2017-11-23 | End: 2017-11-23

## 2017-11-23 RX ORDER — EPINEPHRINE 1 MG/ML
INJECTION, SOLUTION, CONCENTRATE INTRAVENOUS
Status: DISCONTINUED
Start: 2017-11-23 | End: 2017-11-23 | Stop reason: HOSPADM

## 2017-11-23 RX ADMIN — EPINEPHRINE 0.5 MG: 1 INJECTION, SOLUTION, CONCENTRATE INTRAVENOUS at 13:18

## 2017-11-23 RX ADMIN — FAMOTIDINE 20 MG: 10 INJECTION INTRAVENOUS at 13:10

## 2017-11-23 RX ADMIN — DIPHENHYDRAMINE HYDROCHLORIDE 25 MG: 50 INJECTION, SOLUTION INTRAMUSCULAR; INTRAVENOUS at 13:08

## 2017-11-23 RX ADMIN — METHYLPREDNISOLONE SODIUM SUCCINATE 125 MG: 125 INJECTION, POWDER, FOR SOLUTION INTRAMUSCULAR; INTRAVENOUS at 13:07

## 2017-11-23 ASSESSMENT — PAIN SCALES - GENERAL: PAINLEVEL_OUTOF10: 0

## 2017-11-23 NOTE — ED NOTES
Pt ambulates to room w/ steady gait. A & O, privacy, gowned, plan of care & support given. Family at bedside.    Pt was seen at this ER yesterday for same S/S. Was prescribed decreasing steroid dose pack which she has started using.   Pt presents today as rash is worse and spreading.   Pt adds this has happened in the past and she ended up needing IV steroids for effective treatment.

## 2017-11-23 NOTE — ED NOTES
Pt and family given d/c instructions, questions answered and support given. Pt will call PCP for follow up care. Pt will call 911 immediately if she has difficulty breathing or if she feels face or oral swelling.  Pt understands the use of her medications.

## 2017-11-23 NOTE — ED NOTES
The Medication Reconciliation process has been completed by interviewing the patient    Allergies have been reviewed  Antibiotic use in 30 days - keflex and clinda    Home Pharmacy:  [facility]

## 2017-11-23 NOTE — DISCHARGE INSTRUCTIONS
Allergies  An allergy is an abnormal reaction to a substance by the body's defense system (immune system). Allergies can develop at any age.  WHAT CAUSES ALLERGIES?  An allergic reaction happens when the immune system mistakenly reacts to a normally harmless substance, called an allergen, as if it were harmful. The immune system releases antibodies to fight the substance. Antibodies eventually release a chemical called histamine into the bloodstream. The release of histamine is meant to protect the body from infection, but it also causes discomfort.  An allergic reaction can be triggered by:  · Eating an allergen.  · Inhaling an allergen.  · Touching an allergen.  WHAT TYPES OF ALLERGIES ARE THERE?  There are many types of allergies. Common types include:  · Seasonal allergies. People with this type of allergy are usually allergic to substances that are only present during certain seasons, such as molds and pollens.  · Food allergies.  · Drug allergies.  · Insect allergies.  · Animal dander allergies.  WHAT ARE SYMPTOMS OF ALLERGIES?  Possible allergy symptoms include:  · Swelling of the lips, face, tongue, mouth, or throat.  · Sneezing, coughing, or wheezing.  · Nasal congestion.  · Tingling in the mouth.  · Rash.  · Itching.  · Itchy, red, swollen areas of skin (hives).  · Watery eyes.  · Vomiting.  · Diarrhea.  · Dizziness.  · Lightheadedness.  · Fainting.  · Trouble breathing or swallowing.  · Chest tightness.  · Rapid heartbeat.  HOW ARE ALLERGIES DIAGNOSED?  Allergies are diagnosed with a medical and family history and one or more of the following:  · Skin tests.  · Blood tests.  · A food diary. A food diary is a record of all the foods and drinks you have in a day and of all the symptoms you experience.  · The results of an elimination diet. An elimination diet involves eliminating foods from your diet and then adding them back in one by one to find out if a certain food causes an allergic reaction.  HOW ARE  ALLERGIES TREATED?  There is no cure for allergies, but allergic reactions can be treated with medicine. Severe reactions usually need to be treated at a hospital.  HOW CAN REACTIONS BE PREVENTED?  The best way to prevent an allergic reaction is by avoiding the substance you are allergic to. Allergy shots and medicines can also help prevent reactions in some cases. People with severe allergic reactions may be able to prevent a life-threatening reaction called anaphylaxis with a medicine given right after exposure to the allergen.     This information is not intended to replace advice given to you by your health care provider. Make sure you discuss any questions you have with your health care provider.     Document Released: 03/12/2004 Document Revised: 01/08/2016 Document Reviewed: 09/29/2015  [employer] Interactive Patient Education ©2016 [employer]

## 2017-11-23 NOTE — ED PROVIDER NOTES
ED Provider Note    CHIEF COMPLAINT  Chief Complaint   Patient presents with   • Rash     Since Sun  In ED yesterday Dx urticari poss ABX med Rx  Placed on oral steroids but not better Thinks she needs parental steroids       HPI  Lily Nicole is a 21 y.o. female who presentsFor evaluation of a rash. The patient completed antibiotics over week or so ago. She is dialysis patient. She had dialysis yesterday which is her normal scheduled day and states that she had a full run time. She's had no fevers. She said no difficulty breathing. She's had no cough. 4 days ago she started having a pruritic rash. This is generalized. She was seen in the emergency department yesterday inserted on prednisone. She states it's gotten worse since yesterday. She has been taking Benadryl. She's had this happen in the past and states that she has required IV steroids.    REVIEW OF SYSTEMS  See HPI for further details. All other systems are negative.     PAST MEDICAL HISTORY  Past Medical History:   Diagnosis Date   • Dialysis patient (CMS-HCC)     pt gets dialysis M,W,F [facility]   • Hemorrhagic disorder (CMS-HCC)     nose bleeds   • Hypertension    • Infectious disease     pt had the flu on 2/20/17   • Lupus    • Renal disorder    • Seizure (CMS-HCC) 2013    from high blood pressure       FAMILY HISTORY  No family history on file.    SOCIAL HISTORY  Social History     Social History   • Marital status: Single     Spouse name: N/A   • Number of children: N/A   • Years of education: N/A     Social History Main Topics   • Smoking status: Never Smoker   • Smokeless tobacco: Never Used   • Alcohol use No   • Drug use: No   • Sexual activity: Not on file     Other Topics Concern   • Not on file     Social History Narrative   • No narrative on file       SURGICAL HISTORY  Past Surgical History:   Procedure Laterality Date   • ULI BY LAPAROSCOPY  4/5/2010    Performed by SYED MARTELL at SURGERY [facility] ORS   • AV FISTULA CREATION    "  • CHOLECYSTECTOMY     • OTHER      renal biopsy x 3   • OTHER      bone marrow biopsy       CURRENT MEDICATIONS  Home Medications     Reviewed by Shefali Tran (Pharmacy Tech) on 11/23/17 at 1324  Med List Status: Not Addressed   Medication Last Dose Status   atenolol (TENORMIN) 25 MG Tab 11/22/2017 Active   cephALEXin (KEFLEX) 500 MG Cap 1 WEEK Active   clindamycin (CLEOCIN) 300 MG Cap 1 WEEK Active   hydroxychloroquine (PLAQUENIL) 200 MG Tab 11/22/2017 Active   mycophenolate sodium (MYFORTIC) 360 MG Tablet Delayed Response tablet 11/22/2017 Active   predniSONE (DELTASONE) 10 MG Tab 11/22/2017 Active                ALLERGIES  Allergies   Allergen Reactions   • Metoprolol Nausea       PHYSICAL EXAM  VITAL SIGNS: /68   Pulse (!) 58   Temp 36.7 °C (98 °F)   Resp 17   Ht 1.702 m (5' 7\")   Wt 63.4 kg (139 lb 12.4 oz)   LMP 11/10/2017 (Exact Date)   SpO2 96%   BMI 21.89 kg/m²     Constitutional: Well developed, Well nourished, No acute distress, Non-toxic appearance.   HENT: Normocephalic, Atraumatic. Moist membranes are moist. No oral lesions.  Eyes:  EOMI, Conjunctiva normal, No discharge.   Neck: Normal range of motion. No stridor.  Cardiovascular: Normal heart rate, Normal rhythm, No murmurs, No rubs, No gallops.   Thorax & Lungs: Lungs clear to auscultation bilaterally without wheezes, rales or rhonchi. No respiratory distress.   Skin: Warm, Dry. Generalized erythematous urticarial type rash. No vesicular lesions. No target lesions.  Musculoskeletal: Good range of motion in all major joints.  Neurologic: Awake alert .    COURSE & MEDICAL DECISION MAKING  Pertinent Labs & Imaging studies reviewed. (See chart for details)  This is a 21-year-old here for evaluation of a rash. An IV is established. She is treated with Solu-Medrol IV as well as Benadryl and Protonix. She is also treated with epinephrine IM. Upon repeat evaluation the patient is greatly improved. She states that the itching is " essentially gone at this point. The rash is very faint at this point. I do not think she requires further evaluation the emergency department or acute hospitalization. She has Benadryl and prednisone at home which I would like her to continue taking. I will provide her a prescription for Pepcid that she will also start taking. She is given a discharge instruction sheet on allergies. She should return here for any worsening symptoms.    FINAL IMPRESSION  1. Urticarial rash  2.   3.         Electronically signed by: John Champion, 11/23/2017 1:39 PM

## 2017-11-30 ASSESSMENT — ENCOUNTER SYMPTOMS
SHORTNESS OF BREATH: 0
NAIL CHANGES: 0
FEVER: 0
VOMITING: 0

## 2017-11-30 NOTE — PROGRESS NOTES
"Subjective:   Lily Nicole is a 22 y.o. female who presents for Rash (all over body, x 2 days)     Rash   This is a new problem. The current episode started in the past 7 days. The problem has been gradually worsening since onset. The rash is diffuse. The rash is characterized by redness. Pertinent negatives include no congestion, fever, joint pain, nail changes, shortness of breath or vomiting.     Review of Systems   Constitutional: Negative for fever.   HENT: Negative for congestion.    Respiratory: Negative for shortness of breath.    Gastrointestinal: Negative for vomiting.   Musculoskeletal: Negative for joint pain.   Skin: Positive for rash. Negative for nail changes.      Objective:   /68   Pulse 88   Temp 36.7 °C (98 °F)   Resp 16   Ht 1.702 m (5' 7\")   Wt 63 kg (138 lb 14.2 oz)   LMP 11/07/2017   SpO2 97%   Breastfeeding? No   BMI 21.75 kg/m²   Physical Exam   Constitutional: She is oriented to person, place, and time. She appears well-developed and well-nourished. No distress.   Eyes: EOM are normal. Pupils are equal, round, and reactive to light.   Cardiovascular: Normal rate, regular rhythm, normal heart sounds and intact distal pulses.    Pulmonary/Chest: Effort normal and breath sounds normal. No respiratory distress.   Abdominal: Soft. Bowel sounds are normal. She exhibits no distension.   Musculoskeletal: Normal range of motion.   Neurological: She is alert and oriented to person, place, and time. She has normal reflexes.   Skin: Skin is warm and dry. Rash noted. Rash is papular. Rash is not urticarial.   Psychiatric: She has a normal mood and affect. Her behavior is normal.        Assessment/Plan:     1. Viral exanthem  OTC Benadryl per 's directions.    "

## 2017-12-05 RX ORDER — ATENOLOL 25 MG/1
TABLET ORAL
Qty: 90 TAB | Refills: 3 | Status: SHIPPED | OUTPATIENT
Start: 2017-12-05 | End: 2019-03-08 | Stop reason: SDUPTHER

## 2018-01-05 ENCOUNTER — OFFICE VISIT (OUTPATIENT)
Dept: URGENT CARE | Facility: PHYSICIAN GROUP | Age: 23
End: 2018-01-05
Payer: COMMERCIAL

## 2018-01-05 VITALS
HEART RATE: 84 BPM | DIASTOLIC BLOOD PRESSURE: 62 MMHG | SYSTOLIC BLOOD PRESSURE: 100 MMHG | BODY MASS INDEX: 21.5 KG/M2 | TEMPERATURE: 98.7 F | OXYGEN SATURATION: 98 % | WEIGHT: 137 LBS | HEIGHT: 67 IN

## 2018-01-05 DIAGNOSIS — J01.00 ACUTE NON-RECURRENT MAXILLARY SINUSITIS: ICD-10-CM

## 2018-01-05 PROCEDURE — 99214 OFFICE O/P EST MOD 30 MIN: CPT | Performed by: FAMILY MEDICINE

## 2018-01-05 RX ORDER — DOXYCYCLINE HYCLATE 100 MG
100 TABLET ORAL 2 TIMES DAILY
Qty: 14 TAB | Refills: 0 | Status: SHIPPED | OUTPATIENT
Start: 2018-01-05 | End: 2018-01-12

## 2018-01-05 ASSESSMENT — ENCOUNTER SYMPTOMS
RHINORRHEA: 1
SINUS PAIN: 1
HEADACHES: 1
COUGH: 1

## 2018-01-05 ASSESSMENT — PAIN SCALES - GENERAL: PAINLEVEL: NO PAIN

## 2018-01-05 NOTE — PROGRESS NOTES
"Subjective:   Lily Nicole is a 22 y.o. female who presents for Congestion (x3 weeks nasal yudy, slight headache x1 week )        URI    This is a new problem. The current episode started 1 to 4 weeks ago. The problem has been gradually worsening. There has been no fever. Associated symptoms include congestion, coughing, headaches, rhinorrhea and sinus pain.     Review of Systems   HENT: Positive for congestion, rhinorrhea and sinus pain.    Respiratory: Positive for cough.    Neurological: Positive for headaches.     Allergies   Allergen Reactions   • Clindamycin Rash     Hive   • Keflex Rash     Hives   • Metoprolol Nausea      Objective:   /62   Pulse 84   Temp 37.1 °C (98.7 °F)   Ht 1.702 m (5' 7\")   Wt 62.1 kg (137 lb)   SpO2 98%   Breastfeeding? No   BMI 21.46 kg/m²   Physical Exam   Constitutional: She is oriented to person, place, and time. She appears well-developed and well-nourished. No distress.   HENT:   Head: Normocephalic and atraumatic.   Eyes: Conjunctivae and EOM are normal. Pupils are equal, round, and reactive to light.   Cardiovascular: Normal rate and regular rhythm.    No murmur heard.  Pulmonary/Chest: Effort normal and breath sounds normal. No respiratory distress.   Abdominal: Soft. She exhibits no distension. There is no tenderness.   Neurological: She is alert and oriented to person, place, and time. She has normal reflexes. No sensory deficit.   Skin: Skin is warm and dry.   Psychiatric: She has a normal mood and affect.         Assessment/Plan:   Assessment    1. Acute non-recurrent maxillary sinusitis  Differential diagnosis, natural history, supportive care, and indications for immediate follow-up discussed.   - doxycycline (VIBRAMYCIN) 100 MG Tab; Take 1 Tab by mouth 2 times a day for 7 days.  Dispense: 14 Tab; Refill: 0        "

## 2018-01-08 PROCEDURE — 99285 EMERGENCY DEPT VISIT HI MDM: CPT

## 2018-01-09 ENCOUNTER — HOSPITAL ENCOUNTER (EMERGENCY)
Facility: MEDICAL CENTER | Age: 23
End: 2018-01-09
Attending: EMERGENCY MEDICINE
Payer: COMMERCIAL

## 2018-01-09 ENCOUNTER — APPOINTMENT (OUTPATIENT)
Dept: RADIOLOGY | Facility: MEDICAL CENTER | Age: 23
End: 2018-01-09
Attending: EMERGENCY MEDICINE
Payer: COMMERCIAL

## 2018-01-09 VITALS
SYSTOLIC BLOOD PRESSURE: 117 MMHG | TEMPERATURE: 98.1 F | HEART RATE: 75 BPM | DIASTOLIC BLOOD PRESSURE: 82 MMHG | OXYGEN SATURATION: 97 % | BODY MASS INDEX: 21.73 KG/M2 | RESPIRATION RATE: 16 BRPM | HEIGHT: 67 IN | WEIGHT: 138.45 LBS

## 2018-01-09 DIAGNOSIS — K59.00 ACUTE CONSTIPATION: ICD-10-CM

## 2018-01-09 DIAGNOSIS — R51.9 NONINTRACTABLE HEADACHE, UNSPECIFIED CHRONICITY PATTERN, UNSPECIFIED HEADACHE TYPE: ICD-10-CM

## 2018-01-09 DIAGNOSIS — R10.13 EPIGASTRIC ABDOMINAL PAIN: ICD-10-CM

## 2018-01-09 DIAGNOSIS — N18.1 STAGE 1 CHRONIC KIDNEY DISEASE: ICD-10-CM

## 2018-01-09 LAB
ALBUMIN SERPL BCP-MCNC: 3.6 G/DL (ref 3.2–4.9)
ALBUMIN/GLOB SERPL: 0.8 G/DL
ALP SERPL-CCNC: 52 U/L (ref 30–99)
ALT SERPL-CCNC: 8 U/L (ref 2–50)
ANION GAP SERPL CALC-SCNC: 11 MMOL/L (ref 0–11.9)
APPEARANCE UR: CLEAR
AST SERPL-CCNC: 15 U/L (ref 12–45)
BASOPHILS # BLD AUTO: 0.3 % (ref 0–1.8)
BASOPHILS # BLD: 0.02 K/UL (ref 0–0.12)
BILIRUB SERPL-MCNC: 0.5 MG/DL (ref 0.1–1.5)
BILIRUB UR QL STRIP.AUTO: NEGATIVE
BUN SERPL-MCNC: 26 MG/DL (ref 8–22)
CALCIUM SERPL-MCNC: 9.1 MG/DL (ref 8.4–10.2)
CHLORIDE SERPL-SCNC: 100 MMOL/L (ref 96–112)
CO2 SERPL-SCNC: 26 MMOL/L (ref 20–33)
COLOR UR: YELLOW
CREAT SERPL-MCNC: 4.88 MG/DL (ref 0.5–1.4)
CULTURE IF INDICATED INDCX: NO UA CULTURE
EOSINOPHIL # BLD AUTO: 0.05 K/UL (ref 0–0.51)
EOSINOPHIL NFR BLD: 0.9 % (ref 0–6.9)
ERYTHROCYTE [DISTWIDTH] IN BLOOD BY AUTOMATED COUNT: 48.5 FL (ref 35.9–50)
GLOBULIN SER CALC-MCNC: 4.4 G/DL (ref 1.9–3.5)
GLUCOSE SERPL-MCNC: 89 MG/DL (ref 65–99)
GLUCOSE UR STRIP.AUTO-MCNC: NEGATIVE MG/DL
HCG UR QL: NEGATIVE
HCT VFR BLD AUTO: 36.4 % (ref 37–47)
HGB BLD-MCNC: 11.7 G/DL (ref 12–16)
IMM GRANULOCYTES # BLD AUTO: 0.03 K/UL (ref 0–0.11)
IMM GRANULOCYTES NFR BLD AUTO: 0.5 % (ref 0–0.9)
KETONES UR STRIP.AUTO-MCNC: NEGATIVE MG/DL
LEUKOCYTE ESTERASE UR QL STRIP.AUTO: NEGATIVE
LIPASE SERPL-CCNC: 40 U/L (ref 7–58)
LYMPHOCYTES # BLD AUTO: 1.7 K/UL (ref 1–4.8)
LYMPHOCYTES NFR BLD: 29.1 % (ref 22–41)
MCH RBC QN AUTO: 28.7 PG (ref 27–33)
MCHC RBC AUTO-ENTMCNC: 32.1 G/DL (ref 33.6–35)
MCV RBC AUTO: 89.2 FL (ref 81.4–97.8)
MICRO URNS: ABNORMAL
MONOCYTES # BLD AUTO: 0.39 K/UL (ref 0–0.85)
MONOCYTES NFR BLD AUTO: 6.7 % (ref 0–13.4)
NEUTROPHILS # BLD AUTO: 3.66 K/UL (ref 2–7.15)
NEUTROPHILS NFR BLD: 62.5 % (ref 44–72)
NITRITE UR QL STRIP.AUTO: NEGATIVE
NRBC # BLD AUTO: 0 K/UL
NRBC BLD-RTO: 0 /100 WBC
PH UR STRIP.AUTO: 8.5 [PH]
PLATELET # BLD AUTO: 208 K/UL (ref 164–446)
PMV BLD AUTO: 9.3 FL (ref 9–12.9)
POTASSIUM SERPL-SCNC: 4.4 MMOL/L (ref 3.6–5.5)
PROT SERPL-MCNC: 8 G/DL (ref 6–8.2)
PROT UR QL STRIP: NEGATIVE MG/DL
RBC # BLD AUTO: 4.08 M/UL (ref 4.2–5.4)
RBC UR QL AUTO: NEGATIVE
SODIUM SERPL-SCNC: 137 MMOL/L (ref 135–145)
SP GR UR REFRACTOMETRY: 1.01
SP GR UR STRIP.AUTO: <=1.005
WBC # BLD AUTO: 5.9 K/UL (ref 4.8–10.8)

## 2018-01-09 PROCEDURE — 85025 COMPLETE CBC W/AUTO DIFF WBC: CPT

## 2018-01-09 PROCEDURE — 700102 HCHG RX REV CODE 250 W/ 637 OVERRIDE(OP): Performed by: EMERGENCY MEDICINE

## 2018-01-09 PROCEDURE — 96376 TX/PRO/DX INJ SAME DRUG ADON: CPT

## 2018-01-09 PROCEDURE — 81003 URINALYSIS AUTO W/O SCOPE: CPT

## 2018-01-09 PROCEDURE — 80053 COMPREHEN METABOLIC PANEL: CPT

## 2018-01-09 PROCEDURE — 96374 THER/PROPH/DIAG INJ IV PUSH: CPT

## 2018-01-09 PROCEDURE — 83690 ASSAY OF LIPASE: CPT

## 2018-01-09 PROCEDURE — 81025 URINE PREGNANCY TEST: CPT

## 2018-01-09 PROCEDURE — A9270 NON-COVERED ITEM OR SERVICE: HCPCS | Performed by: EMERGENCY MEDICINE

## 2018-01-09 PROCEDURE — 74022 RADEX COMPL AQT ABD SERIES: CPT

## 2018-01-09 PROCEDURE — 700105 HCHG RX REV CODE 258: Performed by: EMERGENCY MEDICINE

## 2018-01-09 PROCEDURE — 36415 COLL VENOUS BLD VENIPUNCTURE: CPT

## 2018-01-09 PROCEDURE — 96375 TX/PRO/DX INJ NEW DRUG ADDON: CPT

## 2018-01-09 PROCEDURE — 700111 HCHG RX REV CODE 636 W/ 250 OVERRIDE (IP): Performed by: EMERGENCY MEDICINE

## 2018-01-09 RX ORDER — ONDANSETRON 2 MG/ML
4 INJECTION INTRAMUSCULAR; INTRAVENOUS ONCE
Status: COMPLETED | OUTPATIENT
Start: 2018-01-09 | End: 2018-01-09

## 2018-01-09 RX ORDER — BISACODYL 5 MG
10 TABLET, DELAYED RELEASE (ENTERIC COATED) ORAL ONCE
Status: COMPLETED | OUTPATIENT
Start: 2018-01-09 | End: 2018-01-09

## 2018-01-09 RX ORDER — MORPHINE SULFATE 4 MG/ML
2 INJECTION, SOLUTION INTRAMUSCULAR; INTRAVENOUS ONCE
Status: COMPLETED | OUTPATIENT
Start: 2018-01-09 | End: 2018-01-09

## 2018-01-09 RX ORDER — SODIUM CHLORIDE 9 MG/ML
INJECTION, SOLUTION INTRAVENOUS CONTINUOUS
Status: DISCONTINUED | OUTPATIENT
Start: 2018-01-09 | End: 2018-01-09 | Stop reason: HOSPADM

## 2018-01-09 RX ADMIN — MORPHINE SULFATE 2 MG: 4 INJECTION INTRAVENOUS at 03:20

## 2018-01-09 RX ADMIN — ONDANSETRON 4 MG: 2 INJECTION INTRAMUSCULAR; INTRAVENOUS at 03:20

## 2018-01-09 RX ADMIN — SODIUM CHLORIDE: 9 INJECTION, SOLUTION INTRAVENOUS at 01:15

## 2018-01-09 RX ADMIN — LIDOCAINE HYDROCHLORIDE 30 ML: 20 SOLUTION OROPHARYNGEAL at 01:04

## 2018-01-09 RX ADMIN — MAGNESIUM CITRATE 296 ML: 1.75 LIQUID ORAL at 04:29

## 2018-01-09 RX ADMIN — FAMOTIDINE 20 MG: 10 INJECTION, SOLUTION INTRAVENOUS at 01:15

## 2018-01-09 RX ADMIN — BISACODYL 10 MG: 5 TABLET, COATED ORAL at 04:22

## 2018-01-09 RX ADMIN — ONDANSETRON 4 MG: 2 INJECTION INTRAMUSCULAR; INTRAVENOUS at 01:15

## 2018-01-09 ASSESSMENT — PAIN SCALES - GENERAL
PAINLEVEL_OUTOF10: 5
PAINLEVEL_OUTOF10: 7

## 2018-01-09 NOTE — ED PROVIDER NOTES
CHIEF COMPLAINT  Chief Complaint   Patient presents with   • Abdominal Pain     Pt c/o abdominal pain x 1 week. Pt denies vomitting/diarrhea   • Head Ache     Pt c/o headache x 1 week. Pt states over the counter medication helped the first few days but is no longer helping. Pt denies hx of migraines.        [facility]  Lily Nicole is a 22 y.o. female who presents Tonight with her parents with a chief complaint of generalized abdominal pain worse in the epigastric area and headache for the last week. She denies any history of fever, chills, dysuria, hematuria, vomiting or diarrhea. She does have a history of migraines and complains of a generalized headache tonight. She denies any neck or back pain. She has had no other complaints of pain. She is a dialysis patient.    REVIEW OF SYSTEMS  See HPI for further details. All other system reviews are negative.    PAST MEDICAL HISTORY  Past Medical History:   Diagnosis Date   • Dialysis patient (CMS-HCC)     pt gets dialysis M,W,F [facility]   • Hemorrhagic disorder (CMS-HCC)     nose bleeds   • Hypertension    • Infectious disease     pt had the flu on 2/20/17   • Lupus    • Renal disorder    • Seizure (CMS-HCC) 2013    from high blood pressure       FAMILY HISTORY  No family history on file.    SOCIAL HISTORY  Social History     Social History   • Marital status: Single     Spouse name: N/A   • Number of children: N/A   • Years of education: N/A     Social History Main Topics   • Smoking status: Never Smoker   • Smokeless tobacco: Never Used   • Alcohol use No   • Drug use: No   • Sexual activity: Not on file     Other Topics Concern   • Not on file     Social History Narrative   • No narrative on file       SURGICAL HISTORY  Past Surgical History:   Procedure Laterality Date   • ULI BY LAPAROSCOPY  4/5/2010    Performed by SYED MARTELL at SURGERY [facility] ORS   • AV FISTULA CREATION     • CHOLECYSTECTOMY     • OTHER      renal biopsy x 3   • OTHER      bone marrow  "biopsy       CURRENT MEDICATIONS  See nurses notes    ALLERGIES  Allergies   Allergen Reactions   • Clindamycin Rash     Hive   • Keflex Rash     Hives   • Metoprolol Nausea       PHYSICAL EXAM  VITAL SIGNS: /82   Pulse 75   Temp 36.7 °C (98.1 °F)   Resp 16   Ht 1.702 m (5' 7\")   Wt 62.8 kg (138 lb 7.2 oz)   SpO2 97%   BMI 21.68 kg/m²     Constitutional: Patient is well developed, well nourished in mild distress.  HENT: Normocephalic, Oropharynx moist without erythema , nose normal with no drainage.   Eyes: PERRL, EOMI, Conjunctiva without erythema.   Neck: Supple with no adenopathy. Normal range of motion in flexion, extension and lateral rotation. No tenderness along the bony prominences or paraspinal muscles.  Lymphatic: No lymphadenopathy noted.   Cardiovascular: Normal heart rate and rhythm. No murmur, Good heart tones.  Thorax & Lungs: Clear and equal breath sounds with good excursion. No respiratory distress, no rhonchi or wheezing.  Abdomen: Bowel sounds normal in all four quadrants. Soft,nontender, no rebound , guarding, or CVA tenderness.  Skin: Warm, Dry, mild erythematous facial rash.   Back: No cervical, thoracic, or lumbosacral tenderness.   Extremities: Peripheral pulses 4/4 No edema, No tenderness.   Musculoskeletal: Normal range of motion in all major joints. No tenderness to palpation or major deformities noted.   Neurologic: Alert & oriented x 3, Normal motor function, Normal sensory function.  Psychiatric: Affect normal, Judgment normal, Mood normal.       RADIOLOGY/PROCEDURES  DX-ABDOMEN COMPLETE WITH AP OR PA CXR   Final Result         1. No acute abnormality detected.            COURSE & MEDICAL DECISION MAKING  Pertinent Labs & Imaging studies reviewed. (See chart for details)  Patient received an IV of normal saline. Laboratories were drawn and were unremarkable other than elevated BUN/creatinine consistent with her chronic kidney disease. Diagnostic abdomen and chest were " performed showing increased stool with no signs of obstruction or pneumonia. She was treated for her headache with morphine and Zofran. I'm giving her magnesium citrate and Dulcolax for her constipation. She's been instructed to increase water and raw fiber in her diet and follow up with her primary care doctor within the week for recheck. She is discharged in stable and improved condition.    FINAL IMPRESSION  1. Upper abdominal pain  2. Acute constipation  3. Headache  4. Chronic kidney disease         Electronically signed by: Alix Huntley, 1/9/2018 3:59 [facility] Provider Note

## 2018-01-09 NOTE — ED NOTES
.Pt D/C to home. VSS. IV removed. D/C instructions given to patient. Pt to f/u with pcp for continued observation or return to the ER for worsening symptoms. Pt verbalizes understanding. Pt leaves ED with no acute changes, complaints or concerns. Pt ambulated out with a steady gait and all belongings.

## 2018-01-09 NOTE — ED NOTES
"Chief Complaint   Patient presents with   • Abdominal Pain     Pt c/o abdominal pain x 1 week. Pt denies vomitting/diarrhea   • Head Ache     Pt c/o headache x 1 week. Pt states over the counter medication helped the first few days but is no longer helping. Pt denies hx of migraines.      /82   Pulse 80   Temp 36.7 °C (98.1 °F)   Resp 18   Ht 1.702 m (5' 7\")   Wt 62.8 kg (138 lb 7.2 oz)   SpO2 99%   BMI 21.68 kg/m²     "

## 2018-01-10 ENCOUNTER — PATIENT OUTREACH (OUTPATIENT)
Dept: HEALTH INFORMATION MANAGEMENT | Facility: OTHER | Age: 23
End: 2018-01-10

## 2018-01-17 ENCOUNTER — APPOINTMENT (OUTPATIENT)
Dept: ADMISSIONS | Facility: MEDICAL CENTER | Age: 23
End: 2018-01-17
Attending: SURGERY
Payer: COMMERCIAL

## 2018-01-17 RX ORDER — ACETAMINOPHEN 500 MG
500-1000 TABLET ORAL PRN
COMMUNITY
End: 2019-10-21

## 2018-01-17 RX ORDER — PREDNISONE 5 MG/1
5 TABLET ORAL EVERY EVENING
Status: ON HOLD | COMMUNITY
End: 2020-07-02

## 2018-01-23 ENCOUNTER — HOSPITAL ENCOUNTER (OUTPATIENT)
Facility: MEDICAL CENTER | Age: 23
End: 2018-01-23
Attending: SURGERY | Admitting: SURGERY
Payer: COMMERCIAL

## 2018-01-23 ENCOUNTER — APPOINTMENT (OUTPATIENT)
Dept: RADIOLOGY | Facility: MEDICAL CENTER | Age: 23
End: 2018-01-23
Attending: SURGERY
Payer: COMMERCIAL

## 2018-01-23 VITALS
BODY MASS INDEX: 21.66 KG/M2 | RESPIRATION RATE: 17 BRPM | OXYGEN SATURATION: 98 % | HEART RATE: 79 BPM | WEIGHT: 138.01 LBS | SYSTOLIC BLOOD PRESSURE: 122 MMHG | TEMPERATURE: 97.2 F | DIASTOLIC BLOOD PRESSURE: 84 MMHG | HEIGHT: 67 IN

## 2018-01-23 DIAGNOSIS — T82.858A BYPASS GRAFT STENOSIS, INITIAL ENCOUNTER (HCC): ICD-10-CM

## 2018-01-23 LAB
ANION GAP SERPL CALC-SCNC: 10 MMOL/L (ref 0–11.9)
BUN SERPL-MCNC: 36 MG/DL (ref 8–22)
CALCIUM SERPL-MCNC: 9.6 MG/DL (ref 8.5–10.5)
CHLORIDE SERPL-SCNC: 104 MMOL/L (ref 96–112)
CO2 SERPL-SCNC: 24 MMOL/L (ref 20–33)
CREAT SERPL-MCNC: 5.37 MG/DL (ref 0.5–1.4)
ERYTHROCYTE [DISTWIDTH] IN BLOOD BY AUTOMATED COUNT: 51.8 FL (ref 35.9–50)
GLUCOSE SERPL-MCNC: 84 MG/DL (ref 65–99)
HCG SERPL QL: NEGATIVE
HCT VFR BLD AUTO: 39.9 % (ref 37–47)
HGB BLD-MCNC: 12.8 G/DL (ref 12–16)
MCH RBC QN AUTO: 29.2 PG (ref 27–33)
MCHC RBC AUTO-ENTMCNC: 32.1 G/DL (ref 33.6–35)
MCV RBC AUTO: 91.1 FL (ref 81.4–97.8)
PLATELET # BLD AUTO: 200 K/UL (ref 164–446)
PMV BLD AUTO: 9.3 FL (ref 9–12.9)
POTASSIUM SERPL-SCNC: 4.6 MMOL/L (ref 3.6–5.5)
RBC # BLD AUTO: 4.38 M/UL (ref 4.2–5.4)
SODIUM SERPL-SCNC: 138 MMOL/L (ref 135–145)
WBC # BLD AUTO: 4.4 K/UL (ref 4.8–10.8)

## 2018-01-23 PROCEDURE — 99153 MOD SED SAME PHYS/QHP EA: CPT

## 2018-01-23 PROCEDURE — 160002 HCHG RECOVERY MINUTES (STAT)

## 2018-01-23 PROCEDURE — 700101 HCHG RX REV CODE 250

## 2018-01-23 PROCEDURE — 80048 BASIC METABOLIC PNL TOTAL CA: CPT

## 2018-01-23 PROCEDURE — 700111 HCHG RX REV CODE 636 W/ 250 OVERRIDE (IP)

## 2018-01-23 PROCEDURE — 84703 CHORIONIC GONADOTROPIN ASSAY: CPT

## 2018-01-23 PROCEDURE — 700111 HCHG RX REV CODE 636 W/ 250 OVERRIDE (IP): Performed by: SURGERY

## 2018-01-23 PROCEDURE — 85027 COMPLETE CBC AUTOMATED: CPT

## 2018-01-23 RX ORDER — MIDAZOLAM HYDROCHLORIDE 1 MG/ML
INJECTION INTRAMUSCULAR; INTRAVENOUS
Status: DISCONTINUED
Start: 2018-01-23 | End: 2018-01-23 | Stop reason: HOSPADM

## 2018-01-23 RX ORDER — MIDAZOLAM HYDROCHLORIDE 1 MG/ML
.5-2 INJECTION INTRAMUSCULAR; INTRAVENOUS PRN
Status: ACTIVE | OUTPATIENT
Start: 2018-01-23 | End: 2018-01-23

## 2018-01-23 RX ORDER — ONDANSETRON 2 MG/ML
4 INJECTION INTRAMUSCULAR; INTRAVENOUS ONCE
Status: DISCONTINUED | OUTPATIENT
Start: 2018-01-23 | End: 2018-01-23 | Stop reason: HOSPADM

## 2018-01-23 RX ORDER — HYDROMORPHONE HYDROCHLORIDE 2 MG/ML
INJECTION, SOLUTION INTRAMUSCULAR; INTRAVENOUS; SUBCUTANEOUS
Status: DISCONTINUED
Start: 2018-01-23 | End: 2018-01-23 | Stop reason: HOSPADM

## 2018-01-23 RX ORDER — LIDOCAINE HYDROCHLORIDE 10 MG/ML
INJECTION, SOLUTION INFILTRATION; PERINEURAL
Status: COMPLETED
Start: 2018-01-23 | End: 2018-01-23

## 2018-01-23 RX ORDER — MIDAZOLAM HYDROCHLORIDE 1 MG/ML
INJECTION INTRAMUSCULAR; INTRAVENOUS
Status: COMPLETED
Start: 2018-01-23 | End: 2018-01-23

## 2018-01-23 RX ORDER — MIDAZOLAM HYDROCHLORIDE 1 MG/ML
.5-2 INJECTION INTRAMUSCULAR; INTRAVENOUS PRN
Status: DISCONTINUED | OUTPATIENT
Start: 2018-01-23 | End: 2018-01-23 | Stop reason: HOSPADM

## 2018-01-23 RX ORDER — IODIXANOL 270 MG/ML
60 INJECTION, SOLUTION INTRAVASCULAR ONCE
Status: COMPLETED | OUTPATIENT
Start: 2018-01-23 | End: 2018-01-23

## 2018-01-23 RX ORDER — SODIUM CHLORIDE 9 MG/ML
500 INJECTION, SOLUTION INTRAVENOUS
Status: DISPENSED | OUTPATIENT
Start: 2018-01-23 | End: 2018-01-23

## 2018-01-23 RX ORDER — HYDROMORPHONE HYDROCHLORIDE 2 MG/ML
0.5 INJECTION, SOLUTION INTRAMUSCULAR; INTRAVENOUS; SUBCUTANEOUS ONCE
Status: COMPLETED | OUTPATIENT
Start: 2018-01-23 | End: 2018-01-23

## 2018-01-23 RX ORDER — SODIUM CHLORIDE 9 MG/ML
500 INJECTION, SOLUTION INTRAVENOUS
Status: DISCONTINUED | OUTPATIENT
Start: 2018-01-23 | End: 2018-01-23 | Stop reason: HOSPADM

## 2018-01-23 RX ORDER — ONDANSETRON 2 MG/ML
INJECTION INTRAMUSCULAR; INTRAVENOUS
Status: COMPLETED
Start: 2018-01-23 | End: 2018-01-23

## 2018-01-23 RX ADMIN — FENTANYL CITRATE 25 MCG: 50 INJECTION, SOLUTION INTRAMUSCULAR; INTRAVENOUS at 09:35

## 2018-01-23 RX ADMIN — FENTANYL CITRATE 25 MCG: 50 INJECTION, SOLUTION INTRAMUSCULAR; INTRAVENOUS at 09:46

## 2018-01-23 RX ADMIN — MIDAZOLAM 1 MG: 1 INJECTION INTRAMUSCULAR; INTRAVENOUS at 09:35

## 2018-01-23 RX ADMIN — MIDAZOLAM 1 MG: 1 INJECTION INTRAMUSCULAR; INTRAVENOUS at 08:35

## 2018-01-23 RX ADMIN — HYDROMORPHONE HYDROCHLORIDE 0.5 MG: 2 INJECTION, SOLUTION INTRAMUSCULAR; INTRAVENOUS; SUBCUTANEOUS at 10:17

## 2018-01-23 RX ADMIN — MIDAZOLAM 1 MG: 1 INJECTION INTRAMUSCULAR; INTRAVENOUS at 08:25

## 2018-01-23 RX ADMIN — ONDANSETRON: 2 INJECTION INTRAMUSCULAR; INTRAVENOUS at 11:27

## 2018-01-23 RX ADMIN — MIDAZOLAM 2 MG: 1 INJECTION INTRAMUSCULAR; INTRAVENOUS at 08:44

## 2018-01-23 RX ADMIN — FENTANYL CITRATE 25 MCG: 50 INJECTION, SOLUTION INTRAMUSCULAR; INTRAVENOUS at 08:50

## 2018-01-23 RX ADMIN — MIDAZOLAM 1 MG: 1 INJECTION INTRAMUSCULAR; INTRAVENOUS at 09:22

## 2018-01-23 RX ADMIN — FENTANYL CITRATE 25 MCG: 50 INJECTION, SOLUTION INTRAMUSCULAR; INTRAVENOUS at 08:25

## 2018-01-23 RX ADMIN — FENTANYL CITRATE 50 MCG: 50 INJECTION, SOLUTION INTRAMUSCULAR; INTRAVENOUS at 09:22

## 2018-01-23 RX ADMIN — MIDAZOLAM 1 MG: 1 INJECTION INTRAMUSCULAR; INTRAVENOUS at 09:02

## 2018-01-23 RX ADMIN — MIDAZOLAM 1 MG: 1 INJECTION INTRAMUSCULAR; INTRAVENOUS at 09:46

## 2018-01-23 RX ADMIN — FENTANYL CITRATE 50 MCG: 50 INJECTION, SOLUTION INTRAMUSCULAR; INTRAVENOUS at 08:44

## 2018-01-23 RX ADMIN — LIDOCAINE HYDROCHLORIDE: 10 INJECTION, SOLUTION INFILTRATION; PERINEURAL at 08:15

## 2018-01-23 RX ADMIN — IODIXANOL 60 ML: 270 INJECTION, SOLUTION INTRAVASCULAR at 08:40

## 2018-01-23 RX ADMIN — FENTANYL CITRATE 50 MCG: 50 INJECTION, SOLUTION INTRAMUSCULAR; INTRAVENOUS at 09:02

## 2018-01-23 ASSESSMENT — PAIN SCALES - GENERAL
PAINLEVEL_OUTOF10: 2
PAINLEVEL_OUTOF10: 10
PAINLEVEL_OUTOF10: 0
PAINLEVEL_OUTOF10: 0
PAINLEVEL_OUTOF10: 10

## 2018-01-23 NOTE — OR NURSING
1004 patient arrived from IR s/p alexei fistulagram dressing clean dry intact, patient crying c/o pain procedure site 10/10 will medicate per orders, bp high see flowsheets, plan of care discussed with patient and family agreeble. no signs of bleeding from AVF.  1010 paged and notified doctor zac about patients pain order received for pain medication see MAR  1017 patient medicated as ordered will monitor. no signs of bleeding from AVF.  1125 patient c/o n/v will medicate as ordered.  1203 discharge instructions given to patient, patient and family verbalize understanding of the orders, iv discontinued tip intact, currently vss, no c/o chest pain, shortness of breath, denies n/v, patient wide awake, no signs of bleeding from AVF, criteria met to dc patient home.  1208 patient escorted via w/c with all her personal belongings.

## 2018-01-23 NOTE — DISCHARGE INSTRUCTIONS
ACTIVITY: Rest and take it easy for the first 24 hours.  A responsible adult is recommended to remain with you during that time.  It is normal to feel sleepy.  We encourage you to not do anything that requires balance, judgment or coordination.    MILD FLU-LIKE SYMPTOMS ARE NORMAL. YOU MAY EXPERIENCE GENERALIZED MUSCLE ACHES, THROAT IRRITATION, HEADACHE AND/OR SOME NAUSEA.    FOR 24 HOURS DO NOT:  Drive, operate machinery or run household appliances.  Drink beer or alcoholic beverages.   Make important decisions or sign legal documents.    SPECIAL INSTRUCTIONS: follow up with primary care physician as needed  If you experience chest pain, call 911 return to ER   Limit pulling/pushing more than 10 pounds in next 24 hours.  Call Dr frank with any questions [phone]    DIET: To avoid nausea, slowly advance diet as tolerated, avoiding spicy or greasy foods for the first day.  Add more substantial food to your diet according to your physician's instructions.  Babies can be fed formula or breast milk as soon as they are hungry.  INCREASE FLUIDS AND FIBER TO AVOID CONSTIPATION.    SURGICAL DRESSING/BATHING: keep dressing clean dry intact for 24 hours.    FOLLOW-UP APPOINTMENT:  A follow-up appointment should be arranged with your doctor in [phone]; call to schedule.    You should CALL YOUR PHYSICIAN if you develop:  Fever greater than 101 degrees F.  Pain not relieved by medication, or persistent nausea or vomiting.  Excessive bleeding (blood soaking through dressing) or unexpected drainage from the wound.  Extreme redness or swelling around the incision site, drainage of pus or foul smelling drainage.  Inability to urinate or empty your bladder within 8 hours.  Problems with breathing or chest pain.    You should call 911 if you develop problems with breathing or chest pain.  If you are unable to contact your doctor or surgical center, you should go to the nearest emergency room or urgent care center.  Physician's  telephone #: [phone]    If any questions arise, call your doctor.  If your doctor is not available, please feel free to call the Surgical Center at [phone].  The Center is open Monday through Friday from 7AM to 7PM.  You can also call the HEALTH HOTLINE open 24 hours/day, 7 days/week and speak to a nurse at [phone], or toll free at [phone].    A registered nurse may call you a few days after your surgery to see how you are doing after your procedure.    MEDICATIONS: Resume taking daily medication.  Take prescribed pain medication with food.  If no medication is prescribed, you may take non-aspirin pain medication if needed.  PAIN MEDICATION CAN BE VERY CONSTIPATING.  Take a stool softener or laxative such as senokot, pericolace, or milk of magnesia if needed.  Fistulogram  A fistulogram is an X-ray test to look inside the site where your blood is removed and returned to your body during dialysis (dialysis fistula). Your fistula allows easy and effective access, but sometimes problems can occur, such as narrowing or blockages. A narrowing or blockage can reduce the effectiveness of dialysis. A fistulogram helps to detect these problems. It also lets your health care provider know of any additional treatment you may need.   LET YOUR HEALTH CARE PROVIDER KNOW ABOUT:  · Any allergies you have.     · All medicines you are taking, including vitamins, herbs, eye drops, creams, and over-the-counter medicines.  · Previous problems you or members of your family have had with the use of anesthetics.  · Any reactions you have had to contrast dye.  · Any blood disorders you have.  · Previous surgeries you have had.  · Medical conditions you have.  · Pain, redness, or swelling in your limb with the dialysis fistula.  · Any bleeding from your dialysis fistula.  · Any of the following in the part of your body where the dialysis fistula leads:  ¨ Numbness.  ¨ Tingling.  ¨ Cold feeling.  ¨ Bluish or pale white in  color.  RISKS AND COMPLICATIONS  Generally, a fistulogram is a safe procedure. However, problems can occur and include:  · Bleeding.  · Infection.  · A blood clot in the dialysis fistula.  · A blood clot that travels to your lungs (pulmonary embolism).  BEFORE THE PROCEDURE  · Your health care provider may do some blood or urine tests or both. These will help your health care provider learn how well your kidneys and liver are working and how well your blood clots.  · Ask your health care provider about:  ¨ Changing or stopping your regular medicines. This is especially important if you are taking diabetes medicines or blood thinners.  ¨ Taking medicines such as aspirin and ibuprofen. These medicines can thin your blood. Do not take these medicines before your procedure if your health care provider asks you not to.  · Do not eat or drink anything after midnight on the night before the procedure or as directed by your health care provider.  · Ask your health care provider if you will be able to go home after the procedure. Some people stay overnight in the hospital.  · Plan to have someone take you home after the procedure.  PROCEDURE   · A needle will be inserted into your arm. It will be used to give you medicine. Medicines given may include:  ¨ Numbing medicine (local anesthetic) to numb an area on the limb with your fistula.  ¨ A medicine to help you relax (sedative).  ¨ A medicine that makes you fall asleep (general anesthetic).  · When your skin is numb, a needle will be inserted into your vein.  · A thin, flexible tube (catheter) will be inserted into the needle and guided to the narrowed area.  · Dye (contrast material) will be injected into the catheter. As the contrast material passes through your body, you may feel warm.  · X-rays will be taken. The contrast material will show where any narrowing or blockages are.  · A guide wire and balloon-tipped catheter will be advanced to the blockage site.  · The  balloon will be inflated for a short period of time. The balloon may be inflated several times at this site, or the balloon may be moved to other sites.  · If your health care provider determines the clot will be best treated by a clot-dissolving medicine (thrombolysis), this medicine can be delivered through the catheter instead of using the balloon.  · A mechanical device at the end of the catheter can also be used to break up the clot (thrombectomy).  · At the end of the procedure, the catheter will be removed.  · In some cases, the catheter site will be closed with a stitch or two.  · Pressure will be applied to stop any bleeding, and your skin will be covered with a bandage (dressing).  AFTER THE PROCEDURE  · You will stay in a recovery area until the medicines have worn off.  · You will stay in bed for several hours.  · As you begin to feel better, you will be offered ice and beverages. You may be given food.  · Your health care provider will check your blood pressure and pulse and watch your access site.  · When you can walk, drink, eat, and use the bathroom, you may be discharged.     This information is not intended to replace advice given to you by your health care provider. Make sure you discuss any questions you have with your health care provider.     Document Released: 05/04/2015 Document Reviewed: 05/04/2015  [employer] Interactive Patient Education ©2016 [employer]      If your physician has prescribed pain medication that includes Acetaminophen (Tylenol), do not take additional Acetaminophen (Tylenol) while taking the prescribed medication.    Depression / Suicide Risk    As you are discharged from this [facility] facility, it is important to learn how to keep safe from harming yourself.    Recognize the warning signs:  · Abrupt changes in personality, positive or negative- including increase in energy   · Giving away possessions  · Change in eating patterns- significant weight changes-   positive or negative  · Change in sleeping patterns- unable to sleep or sleeping all the time   · Unwillingness or inability to communicate  · Depression  · Unusual sadness, discouragement and loneliness  · Talk of wanting to die  · Neglect of personal appearance   · Rebelliousness- reckless behavior  · Withdrawal from people/activities they love  · Confusion- inability to concentrate     If you or a loved one observes any of these behaviors or has concerns about self-harm, here's what you can do:  · Talk about it- your feelings and reasons for harming yourself  · Remove any means that you might use to hurt yourself (examples: pills, rope, extension cords, firearm)  · Get professional help from the community (Mental Health, Substance Abuse, psychological counseling)  · Do not be alone:Call your Safe Contact- someone whom you trust who will be there for you.  · Call your local CRISIS HOTLINE 770-8093 or 876-800-6201  · Call your local Children's Mobile Crisis Response Team [place] (425) 381-8220 or www.[employer]  · Call the toll free National Suicide Prevention Hotlines   · National Suicide Prevention Lifeline 955-298-KLGE (3875)  · National Hope Line Network 800-SUICIDE (551-0298)

## 2018-01-23 NOTE — OP REPORT
DATE OF SERVICE:  01/23/2018    PREOPERATIVE DIAGNOSIS:  Stage V kidney disease with recurrent stenoses and   pressurization of right brachiocephalic arteriovenous fistula.    POSTOPERATIVE DIAGNOSIS:  Stage V kidney disease with recurrent stenoses and   pressurization of right brachiocephalic arteriovenous fistula.    OPERATION:  1.  Sheath placements, right arm AV fistula.  2.  Right arm fistulogram and central venogram.  3.  Cutting balloon angioplasty 8x20 mm of the proximal cephalic vein high   grade recurrent stenosis.  4.  Balloon angioplasty of proximal cephalic vein with 7 mm and subsequently 8   mm [place] balloons.  5.  Balloon angioplasties of the small diameter distal end of the AV fistula   from the brachiocephalic anastomosis to the most distal buttonhole site where   the vein is dilated using 5 mm and then 6 mm [place] balloons.    SURGEON:  Eriwn Livingston MD    ANESTHESIA:  Moderate conscious sedation.    FINDINGS:  The patient has a small diameter distal aspect of the fistula from   the anastomosis to the first buttonhole for a length of approximately 8-10 cm.    The proximal aspect of this had significant stenoses.  The other lesion was   the cephalic arch where there was a high-grade stenosis.  I used a cutting   balloon for the first time and the cutting balloon ruptured.  I gently dilated   with a 7.  It was redilated with an 8 at the end of the procedure due to   rebound of the stenosis.  It was significantly improved at both locations of   stenosis after angioplasty and a good thrill was present in the fistula.    DESCRIPTION OF PROCEDURE:  After obtaining informed consent, she was   positioned on the angiography table.  A time-out was performed.  ChloraPrep   was used for the right upper extremity.  I used ultrasound and determined that   I would place the first sheath in the mid arm region where the vein became   dilated and directed this toward the axilla.  I used a micropuncture  technique   and placed a 7-[place] sheath.  An 0.018 wire was passed and with fistulograms   done showing high grade stenosis, I inflated a cutting balloon.  There was   significant wasting.  I inflated to 8 atmospheres and then it ruptured.  The   artery stenosis was partially relieved.  I had seen the contrast leak   extrinsically, but I did not see a leak on subsequent imaging.  To tamponade   the possible bleeding, I used a 7 mm [place] and held it for a minute in the   cephalic arch, and I did not identify any signs of bleeding and subsequent   images.  I also was a little tentative to dilate too aggressively after using   a cutting balloon.  We used a 10 mm balloon inflated in the large diameter   aspect of the vein in the proximal third of the right arm to retrograde grade   angio'ed the arterial inflow and arterial end of the fistula.  It was clear   that we needed to do some angioplasty of the neuro portion of the fistula as   it had some strictures.  This was difficult from the imaging standpoint.  I   placed a 5-[place] sheath with micropuncture technique in the upper arm   directed toward the elbow using ultrasound guidance.  I passed guidewires and   used a Kumpe catheter and imaged.  I initially dilated with a 5 mm balloon   since this area looked so small.  The images showed a poor result.  I   therefore a 6 mm [place] and inflated to 6 and 8 atmospheres in 2 locations   and held the balloon out for approximately 48 seconds.  The net result on   imaging through the Kumpe catheter was good one of improvement.  Good thrill   was present at this location and sometimes a little more proximally.  I   rechecked the cephalic arch and there had been some recurrent stenosis   already.  It was his status when we started, but rebounded some so I passed an   mm Rochel[place] and dilated this area again and that showed improvement and the   thrill was good and pressure was low in the fistula.  We pulled out the   sheaths  and hemostasis was readily achieved.  Blood loss was approximately 50   mL.  The patient tolerated the procedure.  We anticipate she will need repeat   imaging and angioplasty as in the future.       ____________________________________     MD TREASURE Medina / MADHU    DD:  01/23/2018 10:01:43  DT:  01/23/2018 10:33:52    D#:  [id number]  Job#:  [id number]

## 2018-01-23 NOTE — PROGRESS NOTES
IR Procedure Note:    Site Marked and Confirmed with MD, patient and RN pre procedure. Dr. Livingston performed a right arm fistulogram with angioplasty.  The pt tolerated the procedure well; ETCo2 baseline 36, with consistent waveform during the procedure.  Gauze and tegaderm applied to right upper arm, CDI and soft; pressure held x 5 minutes.  Pt alert and verbally appropriate post procedure, vital signs stable during procedure and transport, see flow sheet for vital signs.  Report given to Selam MARTINEZ.  RN transported pt to PPU.

## 2018-01-24 DIAGNOSIS — M32.14 LUPUS NEPHRITIS (HCC): ICD-10-CM

## 2018-01-25 RX ORDER — HYDROXYCHLOROQUINE SULFATE 200 MG/1
TABLET, FILM COATED ORAL
Qty: 30 TAB | Refills: 0 | Status: ON HOLD | OUTPATIENT
Start: 2018-01-25 | End: 2020-01-26

## 2018-01-25 NOTE — TELEPHONE ENCOUNTER
Was the patient seen in the last year in this department? Yes   Last seen 09/07/2017 Last labs 08/10/2017, 01/09/2018  Does patient have an active prescription for medications requested? No     Received Request Via: Patient

## 2018-02-13 ENCOUNTER — HOSPITAL ENCOUNTER (OUTPATIENT)
Dept: RADIOLOGY | Facility: MEDICAL CENTER | Age: 23
End: 2018-02-13
Attending: INTERNAL MEDICINE
Payer: COMMERCIAL

## 2018-02-13 DIAGNOSIS — R51.9 NONINTRACTABLE EPISODIC HEADACHE, UNSPECIFIED HEADACHE TYPE: ICD-10-CM

## 2018-02-13 DIAGNOSIS — M32.8 OTHER FORMS OF SYSTEMIC LUPUS ERYTHEMATOSUS, UNSPECIFIED ORGAN INVOLVEMENT STATUS (HCC): ICD-10-CM

## 2018-02-13 PROCEDURE — 70551 MRI BRAIN STEM W/O DYE: CPT

## 2018-02-16 ENCOUNTER — HOSPITAL ENCOUNTER (OUTPATIENT)
Facility: MEDICAL CENTER | Age: 23
End: 2018-02-16
Attending: INTERNAL MEDICINE
Payer: COMMERCIAL

## 2018-02-16 LAB
ALBUMIN SERPL BCP-MCNC: 4.2 G/DL (ref 3.2–4.9)
ALBUMIN/GLOB SERPL: 1.2 G/DL
ALP SERPL-CCNC: 60 U/L (ref 30–99)
ALT SERPL-CCNC: 8 U/L (ref 2–50)
ANION GAP SERPL CALC-SCNC: 8 MMOL/L (ref 0–11.9)
AST SERPL-CCNC: 14 U/L (ref 12–45)
BASOPHILS # BLD AUTO: 0.6 % (ref 0–1.8)
BASOPHILS # BLD: 0.02 K/UL (ref 0–0.12)
BILIRUB SERPL-MCNC: 0.4 MG/DL (ref 0.1–1.5)
BUN SERPL-MCNC: 13 MG/DL (ref 8–22)
C3 SERPL-MCNC: 120 MG/DL (ref 87–200)
C4 SERPL-MCNC: 33 MG/DL (ref 19–52)
CALCIUM SERPL-MCNC: 9.5 MG/DL (ref 8.5–10.5)
CHLORIDE SERPL-SCNC: 96 MMOL/L (ref 96–112)
CO2 SERPL-SCNC: 32 MMOL/L (ref 20–33)
CREAT SERPL-MCNC: 3.26 MG/DL (ref 0.5–1.4)
CRP SERPL HS-MCNC: 0.59 MG/DL (ref 0–0.75)
EOSINOPHIL # BLD AUTO: 0.13 K/UL (ref 0–0.51)
EOSINOPHIL NFR BLD: 3.7 % (ref 0–6.9)
ERYTHROCYTE [DISTWIDTH] IN BLOOD BY AUTOMATED COUNT: 50.6 FL (ref 35.9–50)
ERYTHROCYTE [SEDIMENTATION RATE] IN BLOOD BY WESTERGREN METHOD: 29 MM/HOUR (ref 0–20)
FERRITIN SERPL-MCNC: 627.4 NG/ML (ref 10–291)
GLOBULIN SER CALC-MCNC: 3.5 G/DL (ref 1.9–3.5)
GLUCOSE SERPL-MCNC: 87 MG/DL (ref 65–99)
HCT VFR BLD AUTO: 41.6 % (ref 37–47)
HGB BLD-MCNC: 13.2 G/DL (ref 12–16)
IMM GRANULOCYTES # BLD AUTO: 0.01 K/UL (ref 0–0.11)
IMM GRANULOCYTES NFR BLD AUTO: 0.3 % (ref 0–0.9)
LYMPHOCYTES # BLD AUTO: 0.91 K/UL (ref 1–4.8)
LYMPHOCYTES NFR BLD: 25.9 % (ref 22–41)
MCH RBC QN AUTO: 29.6 PG (ref 27–33)
MCHC RBC AUTO-ENTMCNC: 31.7 G/DL (ref 33.6–35)
MCV RBC AUTO: 93.3 FL (ref 81.4–97.8)
MONOCYTES # BLD AUTO: 0.21 K/UL (ref 0–0.85)
MONOCYTES NFR BLD AUTO: 6 % (ref 0–13.4)
NEUTROPHILS # BLD AUTO: 2.23 K/UL (ref 2–7.15)
NEUTROPHILS NFR BLD: 63.5 % (ref 44–72)
NRBC # BLD AUTO: 0 K/UL
NRBC BLD-RTO: 0 /100 WBC
PLATELET # BLD AUTO: 160 K/UL (ref 164–446)
PMV BLD AUTO: 10 FL (ref 9–12.9)
POTASSIUM SERPL-SCNC: 3.8 MMOL/L (ref 3.6–5.5)
PROT SERPL-MCNC: 7.7 G/DL (ref 6–8.2)
RBC # BLD AUTO: 4.46 M/UL (ref 4.2–5.4)
SODIUM SERPL-SCNC: 136 MMOL/L (ref 135–145)
WBC # BLD AUTO: 3.5 K/UL (ref 4.8–10.8)

## 2018-02-16 PROCEDURE — 85025 COMPLETE CBC W/AUTO DIFF WBC: CPT

## 2018-02-16 PROCEDURE — 86140 C-REACTIVE PROTEIN: CPT

## 2018-02-16 PROCEDURE — 86147 CARDIOLIPIN ANTIBODY EA IG: CPT

## 2018-02-16 PROCEDURE — 85652 RBC SED RATE AUTOMATED: CPT

## 2018-02-16 PROCEDURE — 86235 NUCLEAR ANTIGEN ANTIBODY: CPT

## 2018-02-16 PROCEDURE — 82728 ASSAY OF FERRITIN: CPT

## 2018-02-16 PROCEDURE — 86225 DNA ANTIBODY NATIVE: CPT

## 2018-02-16 PROCEDURE — 85730 THROMBOPLASTIN TIME PARTIAL: CPT

## 2018-02-16 PROCEDURE — 86038 ANTINUCLEAR ANTIBODIES: CPT

## 2018-02-16 PROCEDURE — 86160 COMPLEMENT ANTIGEN: CPT | Mod: 91

## 2018-02-16 PROCEDURE — 85610 PROTHROMBIN TIME: CPT

## 2018-02-16 PROCEDURE — 80053 COMPREHEN METABOLIC PANEL: CPT

## 2018-02-16 PROCEDURE — 85613 RUSSELL VIPER VENOM DILUTED: CPT

## 2018-02-20 LAB
CARDIOLIPIN IGA SER IA-ACNC: 1 APL (ref 0–11)
CARDIOLIPIN IGG SER IA-ACNC: 2 GPL (ref 0–14)
CARDIOLIPIN IGM SER IA-ACNC: 0 MPL (ref 0–12)

## 2018-02-22 LAB
DSDNA AB TITR SER CLIF: ABNORMAL {TITER}
ENA SM IGG SER-ACNC: 292 AU/ML (ref 0–40)
ENA SS-B IGG SER IA-ACNC: 5 AU/ML (ref 0–40)
NUCLEAR IGG SER QL IA: DETECTED
NUCLEAR IGG TITR SER IF: ABNORMAL {TITER}
SSA52 R0ENA AB IGG Q0420: 107 AU/ML (ref 0–40)
SSA60 R0ENA AB IGG Q0419: 134 AU/ML (ref 0–40)
U1 SNRNP IGG SER QL: 184 AU/ML (ref 0–40)

## 2018-02-24 LAB
APTT PPP: 32 SEC (ref 24.7–36)
INR PPP: 1.14 (ref 0.87–1.13)
LA PPP-IMP: ABNORMAL
PROTHROMBIN TIME: 14.3 SEC (ref 12–14.6)
SCREEN DRVVT: 39.6 SEC (ref 28–48)

## 2018-05-10 DIAGNOSIS — Z01.812 PRE-OPERATIVE LABORATORY EXAMINATION: ICD-10-CM

## 2018-05-10 LAB
ANION GAP SERPL CALC-SCNC: 7 MMOL/L (ref 0–11.9)
BASOPHILS # BLD AUTO: 1.5 % (ref 0–1.8)
BASOPHILS # BLD: 0.07 K/UL (ref 0–0.12)
BUN SERPL-MCNC: 27 MG/DL (ref 8–22)
CALCIUM SERPL-MCNC: 9.7 MG/DL (ref 8.5–10.5)
CHLORIDE SERPL-SCNC: 101 MMOL/L (ref 96–112)
CO2 SERPL-SCNC: 27 MMOL/L (ref 20–33)
CREAT SERPL-MCNC: 5.65 MG/DL (ref 0.5–1.4)
EOSINOPHIL # BLD AUTO: 0.06 K/UL (ref 0–0.51)
EOSINOPHIL NFR BLD: 1.3 % (ref 0–6.9)
ERYTHROCYTE [DISTWIDTH] IN BLOOD BY AUTOMATED COUNT: 50.1 FL (ref 35.9–50)
GLUCOSE SERPL-MCNC: 84 MG/DL (ref 65–99)
HCG SERPL QL: NEGATIVE
HCT VFR BLD AUTO: 37.9 % (ref 37–47)
HGB BLD-MCNC: 11.9 G/DL (ref 12–16)
IMM GRANULOCYTES # BLD AUTO: 0.02 K/UL (ref 0–0.11)
IMM GRANULOCYTES NFR BLD AUTO: 0.4 % (ref 0–0.9)
LYMPHOCYTES # BLD AUTO: 1.07 K/UL (ref 1–4.8)
LYMPHOCYTES NFR BLD: 23.4 % (ref 22–41)
MCH RBC QN AUTO: 29.2 PG (ref 27–33)
MCHC RBC AUTO-ENTMCNC: 31.4 G/DL (ref 33.6–35)
MCV RBC AUTO: 92.9 FL (ref 81.4–97.8)
MONOCYTES # BLD AUTO: 0.34 K/UL (ref 0–0.85)
MONOCYTES NFR BLD AUTO: 7.4 % (ref 0–13.4)
NEUTROPHILS # BLD AUTO: 3.02 K/UL (ref 2–7.15)
NEUTROPHILS NFR BLD: 66 % (ref 44–72)
NRBC # BLD AUTO: 0 K/UL
NRBC BLD-RTO: 0 /100 WBC
PLATELET # BLD AUTO: 182 K/UL (ref 164–446)
PMV BLD AUTO: 9.4 FL (ref 9–12.9)
POTASSIUM SERPL-SCNC: 4.4 MMOL/L (ref 3.6–5.5)
RBC # BLD AUTO: 4.08 M/UL (ref 4.2–5.4)
SODIUM SERPL-SCNC: 135 MMOL/L (ref 135–145)
WBC # BLD AUTO: 4.6 K/UL (ref 4.8–10.8)

## 2018-05-10 PROCEDURE — 80048 BASIC METABOLIC PNL TOTAL CA: CPT

## 2018-05-10 PROCEDURE — 84703 CHORIONIC GONADOTROPIN ASSAY: CPT

## 2018-05-10 PROCEDURE — 85025 COMPLETE CBC W/AUTO DIFF WBC: CPT

## 2018-05-10 PROCEDURE — 36415 COLL VENOUS BLD VENIPUNCTURE: CPT

## 2018-05-11 ENCOUNTER — HOSPITAL ENCOUNTER (OUTPATIENT)
Facility: MEDICAL CENTER | Age: 23
End: 2018-05-11
Attending: SURGERY | Admitting: SURGERY
Payer: COMMERCIAL

## 2018-05-11 ENCOUNTER — APPOINTMENT (OUTPATIENT)
Dept: RADIOLOGY | Facility: MEDICAL CENTER | Age: 23
End: 2018-05-11
Attending: SURGERY
Payer: COMMERCIAL

## 2018-05-11 VITALS
SYSTOLIC BLOOD PRESSURE: 111 MMHG | HEART RATE: 71 BPM | HEIGHT: 67 IN | OXYGEN SATURATION: 98 % | BODY MASS INDEX: 22.46 KG/M2 | DIASTOLIC BLOOD PRESSURE: 75 MMHG | TEMPERATURE: 98 F | WEIGHT: 143.08 LBS | RESPIRATION RATE: 14 BRPM

## 2018-05-11 DIAGNOSIS — N18.6 END STAGE RENAL DISEASE (HCC): ICD-10-CM

## 2018-05-11 PROCEDURE — 99153 MOD SED SAME PHYS/QHP EA: CPT

## 2018-05-11 PROCEDURE — 700117 HCHG RX CONTRAST REV CODE 255: Performed by: SURGERY

## 2018-05-11 PROCEDURE — 700111 HCHG RX REV CODE 636 W/ 250 OVERRIDE (IP)

## 2018-05-11 PROCEDURE — 700101 HCHG RX REV CODE 250

## 2018-05-11 PROCEDURE — 700111 HCHG RX REV CODE 636 W/ 250 OVERRIDE (IP): Performed by: SURGERY

## 2018-05-11 PROCEDURE — 700102 HCHG RX REV CODE 250 W/ 637 OVERRIDE(OP)

## 2018-05-11 PROCEDURE — 160002 HCHG RECOVERY MINUTES (STAT)

## 2018-05-11 PROCEDURE — A9270 NON-COVERED ITEM OR SERVICE: HCPCS

## 2018-05-11 RX ORDER — IODIXANOL 270 MG/ML
15 INJECTION, SOLUTION INTRAVASCULAR ONCE
Status: COMPLETED | OUTPATIENT
Start: 2018-05-11 | End: 2018-05-11

## 2018-05-11 RX ORDER — LIDOCAINE HYDROCHLORIDE 10 MG/ML
INJECTION, SOLUTION INFILTRATION; PERINEURAL
Status: COMPLETED
Start: 2018-05-11 | End: 2018-05-11

## 2018-05-11 RX ORDER — MIDAZOLAM HYDROCHLORIDE 1 MG/ML
INJECTION INTRAMUSCULAR; INTRAVENOUS
Status: COMPLETED
Start: 2018-05-11 | End: 2018-05-11

## 2018-05-11 RX ORDER — OXYCODONE HYDROCHLORIDE 5 MG/1
TABLET ORAL
Status: DISCONTINUED
Start: 2018-05-11 | End: 2018-05-11 | Stop reason: HOSPADM

## 2018-05-11 RX ORDER — SODIUM CHLORIDE 9 MG/ML
500 INJECTION, SOLUTION INTRAVENOUS
Status: DISCONTINUED | OUTPATIENT
Start: 2018-05-11 | End: 2018-05-11 | Stop reason: HOSPADM

## 2018-05-11 RX ORDER — OXYCODONE HYDROCHLORIDE 5 MG/1
2.5 TABLET ORAL
Status: DISCONTINUED | OUTPATIENT
Start: 2018-05-11 | End: 2018-05-11 | Stop reason: HOSPADM

## 2018-05-11 RX ORDER — OXYCODONE HYDROCHLORIDE 5 MG/1
5 TABLET ORAL
Status: DISCONTINUED | OUTPATIENT
Start: 2018-05-11 | End: 2018-05-11 | Stop reason: HOSPADM

## 2018-05-11 RX ORDER — MIDAZOLAM HYDROCHLORIDE 1 MG/ML
.5-2 INJECTION INTRAMUSCULAR; INTRAVENOUS PRN
Status: DISCONTINUED | OUTPATIENT
Start: 2018-05-11 | End: 2018-05-11 | Stop reason: HOSPADM

## 2018-05-11 RX ADMIN — FENTANYL CITRATE 25 MCG: 50 INJECTION, SOLUTION INTRAMUSCULAR; INTRAVENOUS at 14:10

## 2018-05-11 RX ADMIN — MIDAZOLAM 1 MG: 1 INJECTION INTRAMUSCULAR; INTRAVENOUS at 14:10

## 2018-05-11 RX ADMIN — FENTANYL CITRATE 25 MCG: 50 INJECTION, SOLUTION INTRAMUSCULAR; INTRAVENOUS at 14:12

## 2018-05-11 RX ADMIN — FENTANYL CITRATE 25 MCG: 50 INJECTION, SOLUTION INTRAMUSCULAR; INTRAVENOUS at 14:24

## 2018-05-11 RX ADMIN — MIDAZOLAM 1 MG: 1 INJECTION INTRAMUSCULAR; INTRAVENOUS at 14:12

## 2018-05-11 RX ADMIN — LIDOCAINE HYDROCHLORIDE: 10 INJECTION, SOLUTION INFILTRATION; PERINEURAL at 16:11

## 2018-05-11 RX ADMIN — IODIXANOL 15 ML: 270 INJECTION, SOLUTION INTRAVASCULAR at 14:34

## 2018-05-11 RX ADMIN — MIDAZOLAM 1 MG: 1 INJECTION INTRAMUSCULAR; INTRAVENOUS at 14:28

## 2018-05-11 ASSESSMENT — PAIN SCALES - GENERAL
PAINLEVEL_OUTOF10: 0

## 2018-05-11 NOTE — OR NURSING
1451: Patient from radiology to PPU via gurney. Patient is awake. VSS. RUE CMS intact. R arm fistulogram intact. Thrill and bruit present. Vascular access care management per MD order (see assessment). No c/o pain or nausea at this time. Will monitor closely. Vital signs per MD order.   1510: Patient tolerating PO well. RUE CMS intact  Mom at bedside.   1550: DC instructions given. Questions answered. Family at bedside. L arm fistula soft,CDI. No c/o pain or nausea. Patient wide awake. VSS. Patient met criteria for discharge.

## 2018-05-11 NOTE — OP REPORT
DATE OF SERVICE:  05/11/2018    PREOPERATIVE DIAGNOSES:  Stage V kidney disease with recurrent proximal right   brachiocephalic arteriovenous fistula stenosis.    POSTOPERATIVE DIAGNOSES:  Stage V kidney disease with recurrent proximal right   brachiocephalic arteriovenous fistula stenosis.    OPERATIONS:  1.  Right arm fistula sheath placement under ultrasound and fluoroscopic   guidance with image recorded.  2.  Right arm fistulogram.  3.  Central venograms.  4.  Balloon angioplasties of mid arm with a [place] 8 mm x 40 mm and of the   proximal cephalic vein where it joins the subclavian where a pinhole stenosis   is present with a 6 mm Conquest and an 8 mm x 40 mm [place].    SURGEON:  Erwin Livingston MD    ANESTHESIA:  Moderate conscious sedation and local.    DESCRIPTION OF PROCEDURE:  After obtaining informed consent, the patient was   taken to the angiography room and positioned supine on the table.  Her right   arm fistula has become intensely pulsatile.  The patient's right upper   extremity was prepped with ChloraPrep and draped sterilely.  She was given   sedation.  Her vital signs and oximetry remained normal.  I initially passed a   micro needle, wire, and dilator through the skin and mid arm.  I did a   fistulogram from this micro dilator showing a pinhole stenosis at the cephalic   subclavian junction, but otherwise a widely patent proximal fistula of large   diameter.  Central venous inflow was patent without stenosis observed.  I used   a 6 mm Conquest initially dilating it to 12 atmospheres.  It did not show any   wasting.  I upsized to an 8 mm x 40 mm [place].  I inflated this to 11   atmospheres and after deflation the opening was significantly improved and the   fistula was no longer pulsatile.  There was a very tiny sliver of   extravasation of contrast at the angioplasty site.  Clearly, this 8 mm balloon   is still limited and that is why we were using a relatively compliant   balloon.   Following this, I considered doing a retrograde angiogram, but   noninvasive tests were not showing significant inflow stenosis and the   fistulous main problem was treated successfully.  We removed the sheaths and   held pressure until hemostasis was achieved.  A Tegaderm and gauze dressing   were applied.  I explained to the patient's mother of satisfactory angioplasty   and the status of her daughter.  We anticipate followup in a few months and   periodically repeating angioplasty as necessary.       ____________________________________     MD TREASURE Medina / MADHU    DD:  05/11/2018 14:41:57  DT:  05/11/2018 15:18:12    D#:  [id number]  Job#:  [id number]

## 2018-05-11 NOTE — PROGRESS NOTES
IR Nursing Note:    Patient underwent a Right arm fistulogram with angioplasty by Dr. Livingston.  Site marked and initialed by MD prior to procedure starting and verified by patient and procedure team.  Radial pulses prior to case Right 2+ and Left 2+.   Patient was placed in a supine position. Vitals were taken every 5 minutes and remained stable during procedure (see doc flow sheet for results).  Manual pressure was held for 5 minutes to achieve hemostasis.  A Tegaderm and gauze dressing was placed over surgical site.   Radial pulses post procedure Right 2+ and Left 2+.   Report called to Curtis MARTINEZ. Pt transported via gurney with RN to PPU-4 in a stable condition.  Dr. Livingston spoke with mom to update regarding POC.

## 2018-05-11 NOTE — DISCHARGE INSTRUCTIONS
ACTIVITY: Rest and take it easy for the first 24 hours.  A responsible adult is recommended to remain with you during that time.  It is normal to feel sleepy.  We encourage you to not do anything that requires balance, judgment or coordination.    MILD FLU-LIKE SYMPTOMS ARE NORMAL. YOU MAY EXPERIENCE GENERALIZED MUSCLE ACHES, THROAT IRRITATION, HEADACHE AND/OR SOME NAUSEA.    FOR 24 HOURS DO NOT:  Drive, operate machinery or run household appliances.  Drink beer or alcoholic beverages.   Make important decisions or sign legal documents.      DIET: To avoid nausea, slowly advance diet as tolerated, avoiding spicy or greasy foods for the first day.  Add more substantial food to your diet according to your physician's instructions.  Babies can be fed formula or breast milk as soon as they are hungry.  INCREASE FLUIDS AND FIBER TO AVOID CONSTIPATION.    SURGICAL DRESSING/BATHING:     Keep the dressing clean and dry .  May change dressing as needed.  Do not submerge site under water for 1 week.  No heavy lifting and limit movement for 2 days.    Call [phone] to make appointment only if needed.   Anticipate follow up of AV fistula in 3 months with call from our office.      You should CALL YOUR PHYSICIAN if you develop:  Fever greater than 101 degrees F.  Pain not relieved by medication, or persistent nausea or vomiting.  Excessive bleeding (blood soaking through dressing) or unexpected drainage from the wound.  Extreme redness or swelling around the incision site, drainage of pus or foul smelling drainage.  Inability to urinate or empty your bladder within 8 hours.  Problems with breathing or chest pain.    You should call 911 if you develop problems with breathing or chest pain.  If you are unable to contact your doctor or surgical center, you should go to the nearest emergency room or urgent care center.      Physician's telephone #: [phone]    If any questions arise, call your doctor.  If your doctor is not  available, please feel free to call the Surgical Center at [phone].  The Center is open Monday through Friday from 7AM to 7PM.  You can also call the HEALTH HOTLINE open 24 hours/day, 7 days/week and speak to a nurse at [phone], or toll free at [phone].    A registered nurse may call you a few days after your surgery to see how you are doing after your procedure.    MEDICATIONS: Resume taking daily medication.  Take prescribed pain medication with food.  If no medication is prescribed, you may take non-aspirin pain medication if needed.  PAIN MEDICATION CAN BE VERY CONSTIPATING.  Take a stool softener or laxative such as senokot, pericolace, or milk of magnesia if needed.      If your physician has prescribed pain medication that includes Acetaminophen (Tylenol), do not take additional Acetaminophen (Tylenol) while taking the prescribed medication.    Depression / Suicide Risk    As you are discharged from this [facility] facility, it is important to learn how to keep safe from harming yourself.    Recognize the warning signs:  · Abrupt changes in personality, positive or negative- including increase in energy   · Giving away possessions  · Change in eating patterns- significant weight changes-  positive or negative  · Change in sleeping patterns- unable to sleep or sleeping all the time   · Unwillingness or inability to communicate  · Depression  · Unusual sadness, discouragement and loneliness  · Talk of wanting to die  · Neglect of personal appearance   · Rebelliousness- reckless behavior  · Withdrawal from people/activities they love  · Confusion- inability to concentrate     If you or a loved one observes any of these behaviors or has concerns about self-harm, here's what you can do:  · Talk about it- your feelings and reasons for harming yourself  · Remove any means that you might use to hurt yourself (examples: pills, rope, extension cords, firearm)  · Get professional help from the  community (Mental Health, Substance Abuse, psychological counseling)  · Do not be alone:Call your Safe Contact- someone whom you trust who will be there for you.  · Call your local CRISIS HOTLINE 812-3236 or 001-413-0461  · Call your local Children's Mobile Crisis Response Team [place] (042) 492-4586 or www.[employer]  · Call the toll free National Suicide Prevention Hotlines   · National Suicide Prevention Lifeline 242-224-YYXS (2822)  · National Hope Line Network 800-SUICIDE (568-4695)    Fistulogram, Care After    Refer to this sheet in the next few weeks. These instructions provide you with information on caring for yourself after your procedure. Your health care provider may also give you more specific instructions. Your treatment has been planned according to current medical practices, but problems sometimes occur. Call your health care provider if you have any problems or questions after your procedure.  WHAT TO EXPECT AFTER THE PROCEDURE  After your procedure, it is typical to have the following:  · A small amount of discomfort in the area where the catheters were placed.  · A small amount of bruising around the fistula.  · Sleepiness and fatigue.  HOME CARE INSTRUCTIONS  · Rest at home for the day following your procedure.  · Do not drive or operate heavy machinery while taking pain medicine.  · Take medicines only as directed by your health care provider.  · Do not take baths, swim, or use a hot tub until your health care provider approves. You may shower 24 hours after the procedure or as directed by your health care provider.  · There are many different ways to close and cover an incision, including stitches, skin glue, and adhesive strips. Follow your health care provider's instructions on:  ¨ Incision care.  ¨ Bandage (dressing) changes and removal.  ¨ Incision closure removal.  · Monitor your dialysis fistula carefully.  SEEK MEDICAL CARE IF:  · You have drainage, redness, swelling, or  pain at your catheter site.  · You have a fever.  · You have chills.  SEEK IMMEDIATE MEDICAL CARE IF:  · You feel weak.  · You have trouble balancing.  · You have trouble moving your arms or legs.  · You have problems with your speech or vision.  · You can no longer feel a vibration or buzz when you put your fingers over your dialysis fistula.  · The limb that was used for the procedure:  ¨ Swells.  ¨ Is painful.  ¨ Is cold.  ¨ Is discolored, such as blue or pale white.     This information is not intended to replace advice given to you by your health care provider. Make sure you discuss any questions you have with your health care provider.     Document Released: 05/04/2015 Document Reviewed: 05/04/2015  Elsevier Interactive Patient Education ©2016 Elsevier Inc.

## 2018-07-17 ENCOUNTER — HOSPITAL ENCOUNTER (OUTPATIENT)
Dept: LAB | Facility: MEDICAL CENTER | Age: 23
End: 2018-07-17
Attending: INTERNAL MEDICINE
Payer: COMMERCIAL

## 2018-07-17 ENCOUNTER — HOSPITAL ENCOUNTER (OUTPATIENT)
Dept: RADIOLOGY | Facility: MEDICAL CENTER | Age: 23
End: 2018-07-17
Attending: INTERNAL MEDICINE
Payer: COMMERCIAL

## 2018-07-17 DIAGNOSIS — Z01.818 PREOP EXAMINATION: ICD-10-CM

## 2018-07-17 LAB
25(OH)D3 SERPL-MCNC: 23 NG/ML (ref 30–100)
BASOPHILS # BLD AUTO: 0.9 % (ref 0–1.8)
BASOPHILS # BLD: 0.05 K/UL (ref 0–0.12)
C3 SERPL-MCNC: 122 MG/DL (ref 87–200)
C4 SERPL-MCNC: 30 MG/DL (ref 19–52)
EOSINOPHIL # BLD AUTO: 0.06 K/UL (ref 0–0.51)
EOSINOPHIL NFR BLD: 1.1 % (ref 0–6.9)
ERYTHROCYTE [DISTWIDTH] IN BLOOD BY AUTOMATED COUNT: 45.6 FL (ref 35.9–50)
ERYTHROCYTE [SEDIMENTATION RATE] IN BLOOD BY WESTERGREN METHOD: 55 MM/HOUR (ref 0–20)
HCT VFR BLD AUTO: 36.7 % (ref 37–47)
HGB BLD-MCNC: 11.7 G/DL (ref 12–16)
IMM GRANULOCYTES # BLD AUTO: 0.02 K/UL (ref 0–0.11)
IMM GRANULOCYTES NFR BLD AUTO: 0.4 % (ref 0–0.9)
LYMPHOCYTES # BLD AUTO: 1.16 K/UL (ref 1–4.8)
LYMPHOCYTES NFR BLD: 21.4 % (ref 22–41)
MCH RBC QN AUTO: 29.3 PG (ref 27–33)
MCHC RBC AUTO-ENTMCNC: 31.9 G/DL (ref 33.6–35)
MCV RBC AUTO: 92 FL (ref 81.4–97.8)
MONOCYTES # BLD AUTO: 0.38 K/UL (ref 0–0.85)
MONOCYTES NFR BLD AUTO: 7 % (ref 0–13.4)
NEUTROPHILS # BLD AUTO: 3.76 K/UL (ref 2–7.15)
NEUTROPHILS NFR BLD: 69.2 % (ref 44–72)
NRBC # BLD AUTO: 0 K/UL
NRBC BLD-RTO: 0 /100 WBC
PLATELET # BLD AUTO: 182 K/UL (ref 164–446)
PMV BLD AUTO: 10.2 FL (ref 9–12.9)
RBC # BLD AUTO: 3.99 M/UL (ref 4.2–5.4)
WBC # BLD AUTO: 5.4 K/UL (ref 4.8–10.8)

## 2018-07-17 PROCEDURE — 80053 COMPREHEN METABOLIC PANEL: CPT

## 2018-07-17 PROCEDURE — 71046 X-RAY EXAM CHEST 2 VIEWS: CPT

## 2018-07-17 PROCEDURE — 85652 RBC SED RATE AUTOMATED: CPT

## 2018-07-17 PROCEDURE — 82306 VITAMIN D 25 HYDROXY: CPT

## 2018-07-17 PROCEDURE — 86146 BETA-2 GLYCOPROTEIN ANTIBODY: CPT | Mod: 91

## 2018-07-17 PROCEDURE — 86140 C-REACTIVE PROTEIN: CPT

## 2018-07-17 PROCEDURE — 85025 COMPLETE CBC W/AUTO DIFF WBC: CPT

## 2018-07-17 PROCEDURE — 86160 COMPLEMENT ANTIGEN: CPT | Mod: 91

## 2018-07-17 PROCEDURE — 36415 COLL VENOUS BLD VENIPUNCTURE: CPT

## 2018-07-18 LAB
ALBUMIN SERPL BCP-MCNC: 4 G/DL (ref 3.2–4.9)
ALBUMIN/GLOB SERPL: 1 G/DL
ALP SERPL-CCNC: 66 U/L (ref 30–99)
ALT SERPL-CCNC: 10 U/L (ref 2–50)
ANION GAP SERPL CALC-SCNC: 12 MMOL/L (ref 0–11.9)
AST SERPL-CCNC: 19 U/L (ref 12–45)
B2 GLYCOPROT1 IGG SERPL IA-ACNC: 0 SGU (ref 0–20)
B2 GLYCOPROT1 IGM SERPL IA-ACNC: 1 SMU (ref 0–20)
BILIRUB SERPL-MCNC: 0.4 MG/DL (ref 0.1–1.5)
BUN SERPL-MCNC: 37 MG/DL (ref 8–22)
CALCIUM SERPL-MCNC: 9.5 MG/DL (ref 8.5–10.5)
CHLORIDE SERPL-SCNC: 101 MMOL/L (ref 96–112)
CO2 SERPL-SCNC: 26 MMOL/L (ref 20–33)
CREAT SERPL-MCNC: 6.62 MG/DL (ref 0.5–1.4)
CRP SERPL HS-MCNC: 0.55 MG/DL (ref 0–0.75)
GLOBULIN SER CALC-MCNC: 4.1 G/DL (ref 1.9–3.5)
GLUCOSE SERPL-MCNC: 84 MG/DL (ref 65–99)
POTASSIUM SERPL-SCNC: 4.7 MMOL/L (ref 3.6–5.5)
PROT SERPL-MCNC: 8.1 G/DL (ref 6–8.2)
SODIUM SERPL-SCNC: 139 MMOL/L (ref 135–145)

## 2018-07-19 ENCOUNTER — HOSPITAL ENCOUNTER (OUTPATIENT)
Dept: CARDIOLOGY | Facility: MEDICAL CENTER | Age: 23
End: 2018-07-19
Attending: INTERNAL MEDICINE
Payer: COMMERCIAL

## 2018-07-19 DIAGNOSIS — Z01.818 PRE-TRANSPLANT EVALUATION FOR CKD (CHRONIC KIDNEY DISEASE): ICD-10-CM

## 2018-07-19 LAB
LV EJECT FRACT  99904: 65
LV EJECT FRACT MOD 2C 99903: 62.33
LV EJECT FRACT MOD 4C 99902: 65.56
LV EJECT FRACT MOD BP 99901: 62.21

## 2018-07-19 PROCEDURE — 93306 TTE W/DOPPLER COMPLETE: CPT

## 2018-07-19 PROCEDURE — 93308 TTE F-UP OR LMTD: CPT | Mod: 26 | Performed by: INTERNAL MEDICINE

## 2018-09-09 ENCOUNTER — HOSPITAL ENCOUNTER (EMERGENCY)
Facility: MEDICAL CENTER | Age: 23
End: 2018-09-09
Attending: EMERGENCY MEDICINE
Payer: COMMERCIAL

## 2018-09-09 VITALS
RESPIRATION RATE: 20 BRPM | OXYGEN SATURATION: 98 % | HEART RATE: 83 BPM | DIASTOLIC BLOOD PRESSURE: 68 MMHG | TEMPERATURE: 98.2 F | SYSTOLIC BLOOD PRESSURE: 117 MMHG

## 2018-09-09 DIAGNOSIS — S05.02XA ABRASION OF LEFT CORNEA, INITIAL ENCOUNTER: ICD-10-CM

## 2018-09-09 DIAGNOSIS — H57.89 RED EYE: ICD-10-CM

## 2018-09-09 PROCEDURE — 99284 EMERGENCY DEPT VISIT MOD MDM: CPT

## 2018-09-09 PROCEDURE — 700102 HCHG RX REV CODE 250 W/ 637 OVERRIDE(OP): Performed by: EMERGENCY MEDICINE

## 2018-09-09 PROCEDURE — 700101 HCHG RX REV CODE 250

## 2018-09-09 PROCEDURE — A9270 NON-COVERED ITEM OR SERVICE: HCPCS | Performed by: EMERGENCY MEDICINE

## 2018-09-09 RX ORDER — POLYMYXIN B SULFATE AND TRIMETHOPRIM 1; 10000 MG/ML; [USP'U]/ML
1 SOLUTION OPHTHALMIC EVERY 4 HOURS
Qty: 5 ML | Refills: 0 | Status: SHIPPED | OUTPATIENT
Start: 2018-09-09 | End: 2018-09-16

## 2018-09-09 RX ORDER — HYDROCODONE BITARTRATE AND ACETAMINOPHEN 5; 325 MG/1; MG/1
1 TABLET ORAL ONCE
Status: COMPLETED | OUTPATIENT
Start: 2018-09-09 | End: 2018-09-09

## 2018-09-09 RX ORDER — DIPHENHYDRAMINE HCL 25 MG
25 TABLET ORAL ONCE
Status: COMPLETED | OUTPATIENT
Start: 2018-09-09 | End: 2018-09-09

## 2018-09-09 RX ORDER — PROPARACAINE HYDROCHLORIDE 5 MG/ML
SOLUTION/ DROPS OPHTHALMIC
Status: COMPLETED
Start: 2018-09-09 | End: 2018-09-09

## 2018-09-09 RX ADMIN — HYDROCODONE BITARTRATE AND ACETAMINOPHEN 1 TABLET: 5; 325 TABLET ORAL at 22:02

## 2018-09-09 RX ADMIN — FLUORESCEIN SODIUM: 1 STRIP OPHTHALMIC at 21:30

## 2018-09-09 RX ADMIN — PROPARACAINE HYDROCHLORIDE 2 DROP: 5 SOLUTION/ DROPS OPHTHALMIC at 21:30

## 2018-09-09 RX ADMIN — DIPHENHYDRAMINE HCL 25 MG: 25 TABLET ORAL at 22:02

## 2018-09-09 ASSESSMENT — PAIN SCALES - GENERAL
PAINLEVEL_OUTOF10: 6
PAINLEVEL_OUTOF10: 10
PAINLEVEL_OUTOF10: 0
PAINLEVEL_OUTOF10: 0

## 2018-09-10 NOTE — ED NOTES
Pt went camping 2 weeks ago. States after she left started having redness and pain to L eye. Pt was seen in ER and was told it was allergies and given meds. Pt was then seen by eye dr and was given eye drops and benadryl but pt states when she saw eye dr it was gone. States it came back last week and went to  where she was given hydroxyzine and a different eye drop. Pt states that now her vision is worse and her eye pain and redness has increased.

## 2018-09-10 NOTE — ED PROVIDER NOTES
CHIEF COMPLAINT  Eye pain    [facility]  Lily JOJO Nicole is a 22 y.o. female who presents to the emergency department for left eye pain. She states that she 1st started having mild itching of her left eye approximately 3 weeks ago. She was seen by an optometrist at that time who told her that she had a scratch on the eye and gave her Ciprodex drops which she used for 2 weeks. Approximately 1-1/2 weeks ago, the pain became worse in the eye became much more red. She did present to [facility] emergency department and was given allergy drops as well as a prescription for hydroxyzine and discharged home with instructions to follow-up with an ophthalmologist which she has not done yet. She states that the pain has gotten worse and I seems to be more red. She denies any fevers or pain with movement of the eye. She states that the vision seems to be blurred in the left eye but normal in the right eye. She denies any headaches. She does have a significant past medical history for lupus and is on dialysis.    REVIEW OF SYSTEMS  See [facility] for further details. All other systems are negative.     PAST MEDICAL HISTORY   has a past medical history of Anemia (01/17/2018); AVF (arteriovenous fistula) ([facility]); Dialysis patient ([facility]); ESRD (end stage renal disease) on dialysis ([facility]) (01/17/2018); Heart burn; Hemorrhagic disorder ([facility]); Hypertension (01/17/2018); Indigestion; Infectious disease; Lupus; Migraines (01/17/2018); Pain; Seizure ([facility]) (2013); and Seizure ([facility]).    SOCIAL HISTORY  Social History     Social History Main Topics   • Smoking status: Never Smoker   • Smokeless tobacco: Never Used   • Alcohol use No   • Drug use: No   • Sexual activity: Not on file       SURGICAL HISTORY   has a past surgical history that includes ronak by laparoscopy (4/5/2010); av fistula creation (Right); cholecystectomy; angioplasty (01/17/2018); other; and other.    CURRENT MEDICATIONS  Home Medications    **Home medications have not yet been  reviewed for this encounter**         ALLERGIES  Allergies   Allergen Reactions   • Clindamycin Rash     Hive   • Keflex Rash     Hives   • Metoprolol Nausea       PHYSICAL EXAM  VITAL SIGNS: /75   Pulse 85   Temp 36.8 °C (98.3 °F)   Resp 18   LMP 09/03/2018   SpO2 98%    Constitutional: Alert and in no apparent distress.  HENT: Normocephalic atraumatic. Bilateral external ears normal. Nose normal. Mucous membranes are moist.  Eyes: The right eye appears to be within normal limits. Left eye has a significantly injected sclera with some chemosis. There is some mild erythema around the eyelids but no swelling. The patient has no pain with motion of the eye. With fluorescein staining. There does appear to be uptake at approximate 4 to 5:00.  Neck: Normal range of motion without tenderness. Supple. No meningeal signs.  Cardiovascular: Regular rate and rhythm. No murmurs, gallops or rubs.  Thorax & Lungs: Breath sounds are clear to auscultation bilaterally. No wheezing, rhonchi or rales.  Abdomen: Soft, nontender and nondistended. No peritoneal signs noted.  Skin: Warm and dry. No rashes are noted.  Back: No bony tenderness, No CVA tenderness.   Extremities: 2+ peripheral pulses. Cap refill is less than 2 seconds. No edema, cyanosis, or clubbing. A fistula with a palpable thrill is present in the right upper extremity.  Musculoskeletal: Good range of motion in all major joints. No tenderness to palpation or major deformities noted.   Neurologic: Alert and oriented ×3. The patient moves all 4 extremities and follows commands.  Psychiatric: Affect is normal. Judgment appears to be intact.      COURSE & MEDICAL DECISION MAKING  Pertinent Labs & Imaging studies reviewed. (See chart for details)    This is a 22-year-old female presenting to the emergency department for evaluation of left eye pain and redness. On initial evaluation, the patient was noted to have a significantly injected conjunctivae with mild  chemosis. Visual acuity was performed and was 20/70 in the left eye and 20/40 in the right eye. It was 20/50 with both eyes. An opaque lesion was also noted at approximately 5:00 over the cornea. Fluorescein staining was performed and there was uptake in this area consistent with corneal abrasion.    9:47 PM - I discussed the case with Dr Orourke, ophtho, who recommended starting the patient on polytrim eye drops. I did express my concern for iritis versus scleritis, and Dr Orourke stated that she would be happy to see the patient in the morning. I have less suspicion for acute angle closure glaucoma or orbital cellulitis at this time. The patient understands the importance of calling the office and make an appointment 1st thing in the morning. She is given a prescription for Polytrim eyedrops and will return to the ED with any worsening signs or symptoms.    FINAL IMPRESSION  1. Abrasion of left cornea, initial encounter    2. Red eye        PRESCRIPTIONS  Discharge Medication List as of 9/9/2018 11:21 PM      START taking these medications    Details   polymixin-trimethoprim (POLYTRIM) 50734-2.1 UNIT/ML-% Solution Place 1 Drop in left eye every 4 hours for 7 days., Disp-5 mL, R-0, Print Rx Paper             FOLLOW UP  Maria Orourke M.D.  [address] [place] [id number]  [phone]    Call in 1 day      [facility], Emergency Dept  [address]  [facility] [id number] [phone]    please return to the emergency department if he developing worsening eye pain, worsening vision in the eye, or headache.        -DISCHARGE-           Electronically signed by: Heather Inman, 9/9/2018 9:35 PM

## 2018-09-10 NOTE — ED NOTES
D/c inst reviewed w/ the pt to include f/u op w/ ophthalmology.  And start the eye gtts.  The pt verb understanding of the d/c inst and denies questions.  amb out of the ed w/o diff.

## 2018-10-24 DIAGNOSIS — Z01.812 PRE-OPERATIVE LABORATORY EXAMINATION: ICD-10-CM

## 2018-10-24 LAB
ANION GAP SERPL CALC-SCNC: 11 MMOL/L (ref 0–11.9)
BASOPHILS # BLD AUTO: 0.9 % (ref 0–1.8)
BASOPHILS # BLD: 0.04 K/UL (ref 0–0.12)
BUN SERPL-MCNC: 22 MG/DL (ref 8–22)
CALCIUM SERPL-MCNC: 10.1 MG/DL (ref 8.5–10.5)
CHLORIDE SERPL-SCNC: 94 MMOL/L (ref 96–112)
CO2 SERPL-SCNC: 28 MMOL/L (ref 20–33)
CREAT SERPL-MCNC: 4.96 MG/DL (ref 0.5–1.4)
EOSINOPHIL # BLD AUTO: 0.06 K/UL (ref 0–0.51)
EOSINOPHIL NFR BLD: 1.3 % (ref 0–6.9)
ERYTHROCYTE [DISTWIDTH] IN BLOOD BY AUTOMATED COUNT: 46.9 FL (ref 35.9–50)
GLUCOSE SERPL-MCNC: 90 MG/DL (ref 65–99)
HCG SERPL QL: NEGATIVE
HCT VFR BLD AUTO: 36.5 % (ref 37–47)
HGB BLD-MCNC: 11.9 G/DL (ref 12–16)
IMM GRANULOCYTES # BLD AUTO: 0.03 K/UL (ref 0–0.11)
IMM GRANULOCYTES NFR BLD AUTO: 0.7 % (ref 0–0.9)
LYMPHOCYTES # BLD AUTO: 0.97 K/UL (ref 1–4.8)
LYMPHOCYTES NFR BLD: 21.2 % (ref 22–41)
MCH RBC QN AUTO: 30.1 PG (ref 27–33)
MCHC RBC AUTO-ENTMCNC: 32.6 G/DL (ref 33.6–35)
MCV RBC AUTO: 92.2 FL (ref 81.4–97.8)
MONOCYTES # BLD AUTO: 0.35 K/UL (ref 0–0.85)
MONOCYTES NFR BLD AUTO: 7.7 % (ref 0–13.4)
NEUTROPHILS # BLD AUTO: 3.12 K/UL (ref 2–7.15)
NEUTROPHILS NFR BLD: 68.2 % (ref 44–72)
NRBC # BLD AUTO: 0 K/UL
NRBC BLD-RTO: 0 /100 WBC
PLATELET # BLD AUTO: 225 K/UL (ref 164–446)
PMV BLD AUTO: 9.6 FL (ref 9–12.9)
POTASSIUM SERPL-SCNC: 4 MMOL/L (ref 3.6–5.5)
RBC # BLD AUTO: 3.96 M/UL (ref 4.2–5.4)
SODIUM SERPL-SCNC: 133 MMOL/L (ref 135–145)
WBC # BLD AUTO: 4.6 K/UL (ref 4.8–10.8)

## 2018-10-24 PROCEDURE — 36415 COLL VENOUS BLD VENIPUNCTURE: CPT

## 2018-10-24 PROCEDURE — 85025 COMPLETE CBC W/AUTO DIFF WBC: CPT

## 2018-10-24 PROCEDURE — 84703 CHORIONIC GONADOTROPIN ASSAY: CPT

## 2018-10-24 PROCEDURE — 80048 BASIC METABOLIC PNL TOTAL CA: CPT

## 2018-10-24 RX ORDER — MOXIFLOXACIN 5 MG/ML
SOLUTION/ DROPS OPHTHALMIC 2 TIMES DAILY
COMMUNITY
End: 2019-03-20

## 2018-10-25 ENCOUNTER — HOSPITAL ENCOUNTER (OUTPATIENT)
Facility: MEDICAL CENTER | Age: 23
End: 2018-10-25
Attending: SURGERY | Admitting: SURGERY
Payer: COMMERCIAL

## 2018-10-25 ENCOUNTER — APPOINTMENT (OUTPATIENT)
Dept: RADIOLOGY | Facility: MEDICAL CENTER | Age: 23
End: 2018-10-25
Attending: SURGERY
Payer: COMMERCIAL

## 2018-10-25 VITALS
OXYGEN SATURATION: 97 % | DIASTOLIC BLOOD PRESSURE: 83 MMHG | RESPIRATION RATE: 16 BRPM | SYSTOLIC BLOOD PRESSURE: 111 MMHG | TEMPERATURE: 97.8 F | WEIGHT: 147.93 LBS | HEIGHT: 67 IN | HEART RATE: 82 BPM | BODY MASS INDEX: 23.22 KG/M2

## 2018-10-25 DIAGNOSIS — N18.6 END STAGE RENAL DISEASE (HCC): ICD-10-CM

## 2018-10-25 DIAGNOSIS — I77.0 ARTERIOVENOUS FISTULA, ACQUIRED (HCC): ICD-10-CM

## 2018-10-25 DIAGNOSIS — Z99.2 DEPENDENCE ON RENAL DIALYSIS (HCC): ICD-10-CM

## 2018-10-25 PROCEDURE — 36907 BALO ANGIOP CTR DIALYSIS SEG: CPT

## 2018-10-25 PROCEDURE — 700111 HCHG RX REV CODE 636 W/ 250 OVERRIDE (IP)

## 2018-10-25 PROCEDURE — 700111 HCHG RX REV CODE 636 W/ 250 OVERRIDE (IP): Performed by: SURGERY

## 2018-10-25 PROCEDURE — 700117 HCHG RX CONTRAST REV CODE 255: Performed by: SURGERY

## 2018-10-25 PROCEDURE — 99153 MOD SED SAME PHYS/QHP EA: CPT

## 2018-10-25 PROCEDURE — 160002 HCHG RECOVERY MINUTES (STAT)

## 2018-10-25 RX ORDER — HYDROMORPHONE HYDROCHLORIDE 2 MG/ML
INJECTION, SOLUTION INTRAMUSCULAR; INTRAVENOUS; SUBCUTANEOUS
Status: COMPLETED
Start: 2018-10-25 | End: 2018-10-25

## 2018-10-25 RX ORDER — ONDANSETRON 2 MG/ML
4 INJECTION INTRAMUSCULAR; INTRAVENOUS ONCE
Status: COMPLETED | OUTPATIENT
Start: 2018-10-25 | End: 2018-10-25

## 2018-10-25 RX ORDER — SODIUM CHLORIDE 9 MG/ML
500 INJECTION, SOLUTION INTRAVENOUS
Status: DISCONTINUED | OUTPATIENT
Start: 2018-10-25 | End: 2018-10-25 | Stop reason: HOSPADM

## 2018-10-25 RX ORDER — IODIXANOL 270 MG/ML
36 INJECTION, SOLUTION INTRAVASCULAR ONCE
Status: COMPLETED | OUTPATIENT
Start: 2018-10-25 | End: 2018-10-25

## 2018-10-25 RX ORDER — MIDAZOLAM HYDROCHLORIDE 1 MG/ML
INJECTION INTRAMUSCULAR; INTRAVENOUS
Status: COMPLETED
Start: 2018-10-25 | End: 2018-10-25

## 2018-10-25 RX ORDER — ONDANSETRON 2 MG/ML
INJECTION INTRAMUSCULAR; INTRAVENOUS
Status: COMPLETED
Start: 2018-10-25 | End: 2018-10-25

## 2018-10-25 RX ORDER — HYDROMORPHONE HYDROCHLORIDE 2 MG/ML
1 INJECTION, SOLUTION INTRAMUSCULAR; INTRAVENOUS; SUBCUTANEOUS ONCE
Status: COMPLETED | OUTPATIENT
Start: 2018-10-25 | End: 2018-10-25

## 2018-10-25 RX ORDER — OXYCODONE AND ACETAMINOPHEN 7.5; 325 MG/1; MG/1
1 TABLET ORAL EVERY 4 HOURS PRN
Qty: 1 TAB | Refills: 0 | Status: SHIPPED | OUTPATIENT
Start: 2018-10-25 | End: 2018-10-26

## 2018-10-25 RX ORDER — MIDAZOLAM HYDROCHLORIDE 1 MG/ML
.5-2 INJECTION INTRAMUSCULAR; INTRAVENOUS PRN
Status: DISCONTINUED | OUTPATIENT
Start: 2018-10-25 | End: 2018-10-25 | Stop reason: HOSPADM

## 2018-10-25 RX ADMIN — IODIXANOL 36 ML: 270 INJECTION, SOLUTION INTRAVASCULAR at 14:21

## 2018-10-25 RX ADMIN — FENTANYL CITRATE 25 MCG: 50 INJECTION, SOLUTION INTRAMUSCULAR; INTRAVENOUS at 14:01

## 2018-10-25 RX ADMIN — ONDANSETRON 4 MG: 2 INJECTION INTRAMUSCULAR; INTRAVENOUS at 15:30

## 2018-10-25 RX ADMIN — MIDAZOLAM 1 MG: 1 INJECTION INTRAMUSCULAR; INTRAVENOUS at 13:34

## 2018-10-25 RX ADMIN — FENTANYL CITRATE 50 MCG: 50 INJECTION, SOLUTION INTRAMUSCULAR; INTRAVENOUS at 13:48

## 2018-10-25 RX ADMIN — HYDROMORPHONE HYDROCHLORIDE 1 MG: 2 INJECTION, SOLUTION INTRAMUSCULAR; INTRAVENOUS; SUBCUTANEOUS at 14:29

## 2018-10-25 RX ADMIN — MIDAZOLAM 1 MG: 1 INJECTION INTRAMUSCULAR; INTRAVENOUS at 14:05

## 2018-10-25 RX ADMIN — FENTANYL CITRATE 25 MCG: 50 INJECTION, SOLUTION INTRAMUSCULAR; INTRAVENOUS at 13:44

## 2018-10-25 RX ADMIN — FENTANYL CITRATE 25 MCG: 50 INJECTION, SOLUTION INTRAMUSCULAR; INTRAVENOUS at 13:34

## 2018-10-25 RX ADMIN — MIDAZOLAM 1 MG: 1 INJECTION INTRAMUSCULAR; INTRAVENOUS at 13:44

## 2018-10-25 RX ADMIN — FENTANYL CITRATE 50 MCG: 50 INJECTION, SOLUTION INTRAMUSCULAR; INTRAVENOUS at 14:05

## 2018-10-25 RX ADMIN — MIDAZOLAM 1 MG: 1 INJECTION INTRAMUSCULAR; INTRAVENOUS at 14:01

## 2018-10-25 RX ADMIN — MIDAZOLAM HYDROCHLORIDE 1 MG: 1 INJECTION, SOLUTION INTRAMUSCULAR; INTRAVENOUS at 13:34

## 2018-10-25 RX ADMIN — MIDAZOLAM 1 MG: 1 INJECTION INTRAMUSCULAR; INTRAVENOUS at 13:47

## 2018-10-25 ASSESSMENT — PAIN SCALES - GENERAL
PAINLEVEL_OUTOF10: 0

## 2018-10-25 NOTE — OP REPORT
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    PROCEDURES:  1.  Fistulograms, right arm and central venograms.  2.  Balloon angioplasties, upper mid arm with an 8x40 mm White Owl balloon   followed by a 12x40 mm White Owl, followed by 8x20 mm cutting balloon.  3.  Balloon angioplasties, cephalic arch and distal subclavian with a 7x40 mm   White Owl, followed by an 8x20 cutting balloon, followed by 8 mm White Owl.    SURGEON:  Erwin Livingston MD    ANESTHESIA:  Moderate conscious sedation.    DESCRIPTION OF PROCEDURE:  After obtaining informed consent, the patient was   positioned supine with the right arm outstretched.  A time-out was performed.    The right upper extremity was prepped with ChloraPrep and draped sterilely.    A right arm fistula has been there a long time and has dilated areas and is   pulsatile.  In the distal biceps region, I found an area not too frequently   use by dialysis access to insert a micropuncture needle, wire, and dilator   followed by a 6-Kyrgyz sheath.  I did fistulograms.  Followed this by passage   of a Glidewire.  I dilated the mid upper arm stricture and the patient felt   some discomfort with the 8 mm balloon.  I then dilated the cephalic arch with   a 7x40 mm White Owl.  I increased arch dilatation to a 12 mm White Owl that I   primarily used to dilate the subclavian vein, which was stenotic distally near   the confluence with the cephalic arch.  These areas appeared improved.  I   also used the 12 mm White Owl to redilate the mid upper arm fistula.  It was a   more appropriate size since the vein is large at that location.  In  both   significant lesions, I did use the 8 mm cutting balloon that seemed to have   some beneficial effect.  At the end of the angioplasties, injection showed   brisk flow.  Palpation showed low pressure.  Central veins were opened.  The   patient's blood loss was less than 20 mL.  She tolerated the procedure well.    There were no sign of bleeding.  Pressure was held until hemostasis was   achieved.  A dressing was applied.  She was taken to recovery room in stable   condition.       ____________________________________     MD TREASURE Medina / MADHU    DD:  10/25/2018 14:26:34  DT:  10/25/2018 14:43:06    D#:  [id number]  Job#:  [id number]

## 2018-10-25 NOTE — PROGRESS NOTES
IR Procedure Note:    Site Marked and Confirmed with MD, patient and RN pre procedure. Dr. Livingston performed a right arm fistulogram with angioplasty.  The pt tolerated the procedure well; ETCo2 baseline 37, with consistent waveform during the procedure.  Gauze and tegaderm applied to right forearm, CDI and soft; pressure held x 5 minutes.  Pt alert and verbally appropriate post procedure, vital signs stable during procedure and transport, see flow sheet for vital signs.  Report given to Ghassan MARTINEZ.  RN transported pt to PPU.      Procedure End Time: 6419

## 2018-10-25 NOTE — PROGRESS NOTES
1438: Pt arrived to unit.  Pt resting in bed, A&Ox4.  No reports of pain.  Discussed POC with pt.  Call light within reach. Family at bedside. Site is clean, dry, and intact. Bruit and thrill present.  1515: Pt nauseated, medicated. Site is clean, dry and intact. Bruit and thrill present.   1605: Dressing is clean, dry, and intact. Bruit and thrill present.  All lines and monitors discontinued. Reviewed discharge paperwork with patient and family. No questions at this time. Patient discharged via wheelchair and RN at 1605.

## 2018-10-25 NOTE — DISCHARGE INSTRUCTIONS
ACTIVITY: Rest and take it easy for the first 24 hours.  A responsible adult is recommended to remain with you during that time.  It is normal to feel sleepy.  We encourage you to not do anything that requires balance, judgment or coordination.    MILD FLU-LIKE SYMPTOMS ARE NORMAL. YOU MAY EXPERIENCE GENERALIZED MUSCLE ACHES, THROAT IRRITATION, HEADACHE AND/OR SOME NAUSEA.    FOR 24 HOURS DO NOT:  Drive, operate machinery or run household appliances.  Drink beer or alcoholic beverages.   Make important decisions or sign legal documents.    SPECIAL INSTRUCTIONS: Keep dressing clean, dry and intact until next dialysis appointment     DIET: To avoid nausea, slowly advance diet as tolerated, avoiding spicy or greasy foods for the first day.  Add more substantial food to your diet according to your physician's instructions.  Babies can be fed formula or breast milk as soon as they are hungry.  INCREASE FLUIDS AND FIBER TO AVOID CONSTIPATION.    SURGICAL DRESSING/BATHING: Keep clean, dry, and intact    FOLLOW-UP APPOINTMENT:  A follow-up appointment should be arranged with your doctor Miki; call to schedule.    You should CALL YOUR PHYSICIAN if you develop:  Fever greater than 101 degrees F.  Pain not relieved by medication, or persistent nausea or vomiting.  Excessive bleeding (blood soaking through dressing) or unexpected drainage from the wound.  Extreme redness or swelling around the incision site, drainage of pus or foul smelling drainage.  Inability to urinate or empty your bladder within 8 hours.  Problems with breathing or chest pain.    You should call 911 if you develop problems with breathing or chest pain.  If you are unable to contact your doctor or surgical center, you should go to the nearest emergency room or urgent care center.  Physician's telephone #: [phone]    If any questions arise, call your doctor.  If your doctor is not available, please feel free to call the Surgical Center at [phone].   The Center is open Monday through Friday from 7AM to 7PM.  You can also call the HEALTH HOTLINE open 24 hours/day, 7 days/week and speak to a nurse at [phone], or toll free at [phone].    A registered nurse may call you a few days after your surgery to see how you are doing after your procedure.    MEDICATIONS: Resume taking daily medication.  Take prescribed pain medication with food.  If no medication is prescribed, you may take non-aspirin pain medication if needed.  PAIN MEDICATION CAN BE VERY CONSTIPATING.  Take a stool softener or laxative such as senokot, pericolace, or milk of magnesia if needed.    If your physician has prescribed pain medication that includes Acetaminophen (Tylenol), do not take additional Acetaminophen (Tylenol) while taking the prescribed medication.    Depression / Suicide Risk    As you are discharged from this [facility] facility, it is important to learn how to keep safe from harming yourself.    Recognize the warning signs:  · Abrupt changes in personality, positive or negative- including increase in energy   · Giving away possessions  · Change in eating patterns- significant weight changes-  positive or negative  · Change in sleeping patterns- unable to sleep or sleeping all the time   · Unwillingness or inability to communicate  · Depression  · Unusual sadness, discouragement and loneliness  · Talk of wanting to die  · Neglect of personal appearance   · Rebelliousness- reckless behavior  · Withdrawal from people/activities they love  · Confusion- inability to concentrate     If you or a loved one observes any of these behaviors or has concerns about self-harm, here's what you can do:  · Talk about it- your feelings and reasons for harming yourself  · Remove any means that you might use to hurt yourself (examples: pills, rope, extension cords, firearm)  · Get professional help from the community (Mental Health, Substance Abuse, psychological counseling)  · Do not  be alone:Call your Safe Contact- someone whom you trust who will be there for you.  · Call your local CRISIS HOTLINE 340-1710 or 314-795-5360  · Call your local Children's Mobile Crisis Response Team [place] (165) 094-0897 or www.[facility]  · Call the toll free National Suicide Prevention Hotlines   · National Suicide Prevention Lifeline 909-071-HGKT (4115)  · [employer] Hope Line Network 800-SUICIDE (620-6682)    Fistulogram, Care After  Refer to this sheet in the next few weeks. These instructions provide you with information on caring for yourself after your procedure. Your health care provider may also give you more specific instructions. Your treatment has been planned according to current medical practices, but problems sometimes occur. Call your health care provider if you have any problems or questions after your procedure.  WHAT TO EXPECT AFTER THE PROCEDURE  After your procedure, it is typical to have the following:  · A small amount of discomfort in the area where the catheters were placed.  · A small amount of bruising around the fistula.  · Sleepiness and fatigue.  HOME CARE INSTRUCTIONS  · Rest at home for the day following your procedure.  · Do not drive or operate heavy machinery while taking pain medicine.  · Take medicines only as directed by your health care provider.  · Do not take baths, swim, or use a hot tub until your health care provider approves. You may shower 24 hours after the procedure or as directed by your health care provider.  · There are many different ways to close and cover an incision, including stitches, skin glue, and adhesive strips. Follow your health care provider's instructions on:  ¨ Incision care.  ¨ Bandage (dressing) changes and removal.  ¨ Incision closure removal.  · Monitor your dialysis fistula carefully.  SEEK MEDICAL CARE IF:  · You have drainage, redness, swelling, or pain at your catheter site.  · You have a fever.  · You have chills.  SEEK IMMEDIATE  MEDICAL CARE IF:  · You feel weak.  · You have trouble balancing.  · You have trouble moving your arms or legs.  · You have problems with your speech or vision.  · You can no longer feel a vibration or buzz when you put your fingers over your dialysis fistula.  · The limb that was used for the procedure:  ¨ Swells.  ¨ Is painful.  ¨ Is cold.  ¨ Is discolored, such as blue or pale white.     This information is not intended to replace advice given to you by your health care provider. Make sure you discuss any questions you have with your health care provider.     Document Released: 05/04/2015 Document Reviewed: 05/04/2015  Elsevier Interactive Patient Education ©2016 [employer] Inc.

## 2018-11-28 ENCOUNTER — HOSPITAL ENCOUNTER (OUTPATIENT)
Dept: LAB | Facility: MEDICAL CENTER | Age: 23
End: 2018-11-28
Attending: INTERNAL MEDICINE
Payer: COMMERCIAL

## 2018-11-28 LAB
25(OH)D3 SERPL-MCNC: 15 NG/ML (ref 30–100)
ALBUMIN SERPL BCP-MCNC: 4.2 G/DL (ref 3.2–4.9)
ALT SERPL-CCNC: 10 U/L (ref 2–50)
AST SERPL-CCNC: 19 U/L (ref 12–45)
C3 SERPL-MCNC: 114 MG/DL (ref 87–200)
C4 SERPL-MCNC: 24 MG/DL (ref 19–52)
CRP SERPL HS-MCNC: 0.41 MG/DL (ref 0–0.75)
ERYTHROCYTE [SEDIMENTATION RATE] IN BLOOD BY WESTERGREN METHOD: 55 MM/HOUR (ref 0–20)

## 2018-11-28 PROCEDURE — 82306 VITAMIN D 25 HYDROXY: CPT

## 2018-11-28 PROCEDURE — 84450 TRANSFERASE (AST) (SGOT): CPT

## 2018-11-28 PROCEDURE — 86140 C-REACTIVE PROTEIN: CPT

## 2018-11-28 PROCEDURE — 84460 ALANINE AMINO (ALT) (SGPT): CPT

## 2018-11-28 PROCEDURE — 36415 COLL VENOUS BLD VENIPUNCTURE: CPT

## 2018-11-28 PROCEDURE — 82040 ASSAY OF SERUM ALBUMIN: CPT

## 2018-11-28 PROCEDURE — 85652 RBC SED RATE AUTOMATED: CPT

## 2018-11-28 PROCEDURE — 86160 COMPLEMENT ANTIGEN: CPT | Mod: 91

## 2018-12-03 ENCOUNTER — APPOINTMENT (OUTPATIENT)
Dept: RADIOLOGY | Facility: MEDICAL CENTER | Age: 23
End: 2018-12-03
Attending: EMERGENCY MEDICINE
Payer: COMMERCIAL

## 2018-12-03 ENCOUNTER — HOSPITAL ENCOUNTER (EMERGENCY)
Facility: MEDICAL CENTER | Age: 23
End: 2018-12-03
Attending: EMERGENCY MEDICINE
Payer: COMMERCIAL

## 2018-12-03 VITALS
OXYGEN SATURATION: 96 % | RESPIRATION RATE: 16 BRPM | SYSTOLIC BLOOD PRESSURE: 179 MMHG | BODY MASS INDEX: 23.88 KG/M2 | DIASTOLIC BLOOD PRESSURE: 89 MMHG | HEIGHT: 67 IN | TEMPERATURE: 98.4 F | HEART RATE: 79 BPM | WEIGHT: 152.12 LBS

## 2018-12-03 DIAGNOSIS — R07.9 ACUTE CHEST PAIN: ICD-10-CM

## 2018-12-03 LAB
ALBUMIN SERPL BCP-MCNC: 3.9 G/DL (ref 3.2–4.9)
ALBUMIN/GLOB SERPL: 1 G/DL
ALP SERPL-CCNC: 63 U/L (ref 30–99)
ALT SERPL-CCNC: 15 U/L (ref 2–50)
ANION GAP SERPL CALC-SCNC: 8 MMOL/L (ref 0–11.9)
AST SERPL-CCNC: 21 U/L (ref 12–45)
BASOPHILS # BLD AUTO: 0.8 % (ref 0–1.8)
BASOPHILS # BLD: 0.06 K/UL (ref 0–0.12)
BILIRUB SERPL-MCNC: 0.5 MG/DL (ref 0.1–1.5)
BUN SERPL-MCNC: 59 MG/DL (ref 8–22)
CALCIUM SERPL-MCNC: 9 MG/DL (ref 8.4–10.2)
CHLORIDE SERPL-SCNC: 108 MMOL/L (ref 96–112)
CO2 SERPL-SCNC: 18 MMOL/L (ref 20–33)
CREAT SERPL-MCNC: 7.84 MG/DL (ref 0.5–1.4)
EOSINOPHIL # BLD AUTO: 0.1 K/UL (ref 0–0.51)
EOSINOPHIL NFR BLD: 1.4 % (ref 0–6.9)
ERYTHROCYTE [DISTWIDTH] IN BLOOD BY AUTOMATED COUNT: 48.7 FL (ref 35.9–50)
GLOBULIN SER CALC-MCNC: 3.9 G/DL (ref 1.9–3.5)
GLUCOSE SERPL-MCNC: 87 MG/DL (ref 65–99)
HCT VFR BLD AUTO: 33.4 % (ref 37–47)
HGB BLD-MCNC: 10.5 G/DL (ref 12–16)
IMM GRANULOCYTES # BLD AUTO: 0.01 K/UL (ref 0–0.11)
IMM GRANULOCYTES NFR BLD AUTO: 0.1 % (ref 0–0.9)
LIPASE SERPL-CCNC: 45 U/L (ref 7–58)
LYMPHOCYTES # BLD AUTO: 1.93 K/UL (ref 1–4.8)
LYMPHOCYTES NFR BLD: 26.4 % (ref 22–41)
MCH RBC QN AUTO: 29.2 PG (ref 27–33)
MCHC RBC AUTO-ENTMCNC: 31.4 G/DL (ref 33.6–35)
MCV RBC AUTO: 93 FL (ref 81.4–97.8)
MONOCYTES # BLD AUTO: 0.45 K/UL (ref 0–0.85)
MONOCYTES NFR BLD AUTO: 6.1 % (ref 0–13.4)
NEUTROPHILS # BLD AUTO: 4.77 K/UL (ref 2–7.15)
NEUTROPHILS NFR BLD: 65.2 % (ref 44–72)
NRBC # BLD AUTO: 0 K/UL
NRBC BLD-RTO: 0 /100 WBC
PLATELET # BLD AUTO: 204 K/UL (ref 164–446)
PMV BLD AUTO: 9.1 FL (ref 9–12.9)
POTASSIUM SERPL-SCNC: 4.7 MMOL/L (ref 3.6–5.5)
PROT SERPL-MCNC: 7.8 G/DL (ref 6–8.2)
RBC # BLD AUTO: 3.59 M/UL (ref 4.2–5.4)
SODIUM SERPL-SCNC: 134 MMOL/L (ref 135–145)
TROPONIN I SERPL-MCNC: <0.02 NG/ML (ref 0–0.04)
TROPONIN I SERPL-MCNC: <0.02 NG/ML (ref 0–0.04)
WBC # BLD AUTO: 7.3 K/UL (ref 4.8–10.8)

## 2018-12-03 PROCEDURE — 84484 ASSAY OF TROPONIN QUANT: CPT | Mod: 91

## 2018-12-03 PROCEDURE — 93005 ELECTROCARDIOGRAM TRACING: CPT | Performed by: EMERGENCY MEDICINE

## 2018-12-03 PROCEDURE — 96374 THER/PROPH/DIAG INJ IV PUSH: CPT

## 2018-12-03 PROCEDURE — 36415 COLL VENOUS BLD VENIPUNCTURE: CPT

## 2018-12-03 PROCEDURE — 85025 COMPLETE CBC W/AUTO DIFF WBC: CPT

## 2018-12-03 PROCEDURE — 80053 COMPREHEN METABOLIC PANEL: CPT

## 2018-12-03 PROCEDURE — 71046 X-RAY EXAM CHEST 2 VIEWS: CPT

## 2018-12-03 PROCEDURE — A9270 NON-COVERED ITEM OR SERVICE: HCPCS | Performed by: EMERGENCY MEDICINE

## 2018-12-03 PROCEDURE — 83690 ASSAY OF LIPASE: CPT

## 2018-12-03 PROCEDURE — 700102 HCHG RX REV CODE 250 W/ 637 OVERRIDE(OP): Performed by: EMERGENCY MEDICINE

## 2018-12-03 PROCEDURE — 700111 HCHG RX REV CODE 636 W/ 250 OVERRIDE (IP): Performed by: EMERGENCY MEDICINE

## 2018-12-03 PROCEDURE — 99285 EMERGENCY DEPT VISIT HI MDM: CPT

## 2018-12-03 RX ORDER — MORPHINE SULFATE 4 MG/ML
4 INJECTION, SOLUTION INTRAMUSCULAR; INTRAVENOUS ONCE
Status: COMPLETED | OUTPATIENT
Start: 2018-12-03 | End: 2018-12-03

## 2018-12-03 RX ORDER — OMEPRAZOLE 20 MG/1
20 CAPSULE, DELAYED RELEASE ORAL ONCE
Status: COMPLETED | OUTPATIENT
Start: 2018-12-03 | End: 2018-12-03

## 2018-12-03 RX ADMIN — MORPHINE SULFATE 4 MG: 4 INJECTION INTRAVENOUS at 01:37

## 2018-12-03 RX ADMIN — OMEPRAZOLE 20 MG: 20 CAPSULE, DELAYED RELEASE ORAL at 03:45

## 2018-12-03 ASSESSMENT — ENCOUNTER SYMPTOMS
NECK PAIN: 0
CLAUDICATION: 0
ORTHOPNEA: 0
FOCAL WEAKNESS: 0
EYES NEGATIVE: 1
BRUISES/BLEEDS EASILY: 0
SEIZURES: 0
PALPITATIONS: 0
MYALGIAS: 0
EYE PAIN: 0
WEAKNESS: 0
ABDOMINAL PAIN: 0
GASTROINTESTINAL NEGATIVE: 1
BACK PAIN: 0
HEADACHES: 0
SORE THROAT: 0
SHORTNESS OF BREATH: 0
BLOOD IN STOOL: 0
RESPIRATORY NEGATIVE: 1
FEVER: 0
HALLUCINATIONS: 0
CONSTITUTIONAL NEGATIVE: 1
PND: 0
FLANK PAIN: 0

## 2018-12-03 ASSESSMENT — PAIN SCALES - GENERAL: PAINLEVEL_OUTOF10: 4

## 2018-12-03 NOTE — ED NOTES
D/c inst reviewed w/ the pt to include f/u op w/ pcp.  The pt verb understanding and denies questions. The pt amb out of the ed w/o diff.

## 2018-12-03 NOTE — ED PROVIDER NOTES
ED Provider Note    CHIEF COMPLAINT  No chief complaint on file.      HPI  HPI     23-year-old female with past medical history of ESRD and Monday, Wednesday, Friday dialysis presents to the emergency department with chief complaint of chest pain.  Patient states that she was arguing with sibling when chest pain began.  She describes it as achy and located in the middle of her chest and nonradiating.      She denies history of MI or coronary artery disease.  Patient does have history of lupus however has had no prior intracardiac complications that she is aware of.    Patient denies any anticoagulant usage on a regular basis.  Patient denies any cough or shortness of breath.  Patient denies any new or different leg swelling.  Patient denies any recent changes in medications.      REVIEW OF SYSTEMS  Review of Systems   Constitutional: Negative.  Negative for fever.   HENT: Negative.  Negative for ear pain and sore throat.    Eyes: Negative.  Negative for pain.   Respiratory: Negative.  Negative for shortness of breath.    Cardiovascular: Positive for chest pain. Negative for palpitations, orthopnea, claudication, leg swelling and PND.   Gastrointestinal: Negative.  Negative for abdominal pain and blood in stool.   Genitourinary: Negative for dysuria and flank pain.   Musculoskeletal: Negative for back pain, myalgias and neck pain.   Skin: Negative.  Negative for rash.   Neurological: Negative for focal weakness, seizures, weakness and headaches.   Endo/Heme/Allergies: Does not bruise/bleed easily.   Psychiatric/Behavioral: Negative for hallucinations and suicidal ideas.   All other systems reviewed and are negative.      PAST MEDICAL HISTORY   has a past medical history of Anemia (01/17/2018); AVF (arteriovenous fistula) ([facility]); Dialysis patient ([facility]); ESRD (end stage renal disease) on dialysis ([facility]) (01/17/2018); Heart burn; Hemorrhagic disorder ([facility]); Hypertension (01/17/2018); Indigestion; Infectious disease;  "Lupus; Migraines (01/17/2018); Pain; Seizure ([facility]) (2013); and Seizure ([facility]).    SOCIAL HISTORY  Social History     Social History Main Topics   • Smoking status: Never Smoker   • Smokeless tobacco: Never Used   • Alcohol use No   • Drug use: No   • Sexual activity: Not on file       SURGICAL HISTORY   has a past surgical history that includes ronak by laparoscopy (4/5/2010); av fistula creation (Right); cholecystectomy; angioplasty (01/17/2018); other; and other.    CURRENT MEDICATIONS  Home Medications    **Home medications have not yet been reviewed for this encounter**         ALLERGIES  Allergies   Allergen Reactions   • Clindamycin Rash     Hive   • Keflex Rash     Hives   • Metoprolol Nausea       PHYSICAL EXAM  VITAL SIGNS: BP (!) 179/89   Pulse 76   Temp 36.9 °C (98.4 °F) (Temporal)   Resp 18   Ht 1.702 m (5' 7\")   Wt 69 kg (152 lb 1.9 oz)   SpO2 97%   BMI 23.82 kg/m²  @MAGALI[[id number]::@  Pulse ox interpretation: I interpret this pulse ox as normal.    Physical Exam   Constitutional: She is oriented to person, place, and time and well-developed, well-nourished, and in no distress.   HENT:   Head: Normocephalic and atraumatic.   Right Ear: External ear normal.   Left Ear: External ear normal.   Eyes: Conjunctivae and EOM are normal. No scleral icterus.   Neck: Normal range of motion.   Cardiovascular: Normal rate.    Pulmonary/Chest: Effort normal. No stridor. No respiratory distress. She has no wheezes. She has no rales. She exhibits no tenderness.   Abdominal: She exhibits no distension and no mass. There is no tenderness. There is no rebound and no guarding.   Musculoskeletal: Normal range of motion. She exhibits no edema or deformity.   Neurological: She is alert and oriented to person, place, and time. Coordination normal.   Skin: Skin is warm and dry. No rash noted. No erythema.        Psychiatric: Affect and judgment normal.       DIAGNOSTIC STUDIES / PROCEDURES    LABS/EKG  Results for orders " placed or performed during the hospital encounter of 12/03/18   CBC w/ Differential   Result Value Ref Range    WBC 7.3 4.8 - 10.8 K/uL    RBC 3.59 (L) 4.20 - 5.40 M/uL    Hemoglobin 10.5 (L) 12.0 - 16.0 g/dL    Hematocrit 33.4 (L) 37.0 - 47.0 %    MCV 93.0 81.4 - 97.8 fL    MCH 29.2 27.0 - 33.0 pg    MCHC 31.4 (L) 33.6 - 35.0 g/dL    RDW 48.7 35.9 - 50.0 fL    Platelet Count 204 164 - 446 K/uL    MPV 9.1 9.0 - 12.9 fL    Neutrophils-Polys 65.20 44.00 - 72.00 %    Lymphocytes 26.40 22.00 - 41.00 %    Monocytes 6.10 0.00 - 13.40 %    Eosinophils 1.40 0.00 - 6.90 %    Basophils 0.80 0.00 - 1.80 %    Immature Granulocytes 0.10 0.00 - 0.90 %    Nucleated RBC 0.00 /100 WBC    Neutrophils (Absolute) 4.77 2.00 - 7.15 K/uL    Lymphs (Absolute) 1.93 1.00 - 4.80 K/uL    Monos (Absolute) 0.45 0.00 - 0.85 K/uL    Eos (Absolute) 0.10 0.00 - 0.51 K/uL    Baso (Absolute) 0.06 0.00 - 0.12 K/uL    Immature Granulocytes (abs) 0.01 0.00 - 0.11 K/uL    NRBC (Absolute) 0.00 K/uL   Complete Metabolic Panel (CMP)   Result Value Ref Range    Sodium 134 (L) 135 - 145 mmol/L    Potassium 4.7 3.6 - 5.5 mmol/L    Chloride 108 96 - 112 mmol/L    Co2 18 (L) 20 - 33 mmol/L    Anion Gap 8.0 0.0 - 11.9    Glucose 87 65 - 99 mg/dL    Bun 59 (H) 8 - 22 mg/dL    Creatinine 7.84 (HH) 0.50 - 1.40 mg/dL    Calcium 9.0 8.4 - 10.2 mg/dL    AST(SGOT) 21 12 - 45 U/L    ALT(SGPT) 15 2 - 50 U/L    Alkaline Phosphatase 63 30 - 99 U/L    Total Bilirubin 0.5 0.1 - 1.5 mg/dL    Albumin 3.9 3.2 - 4.9 g/dL    Total Protein 7.8 6.0 - 8.2 g/dL    Globulin 3.9 (H) 1.9 - 3.5 g/dL    A-G Ratio 1.0 g/dL   Troponin STAT   Result Value Ref Range    Troponin I <0.02 0.00 - 0.04 ng/mL   Lipase   Result Value Ref Range    Lipase 45 7 - 58 U/L   ESTIMATED GFR   Result Value Ref Range    GFR If  8 (A) >60 mL/min/1.73 m 2    GFR If Non African American 7 (A) >60 mL/min/1.73 m 2   EKG - STAT   Result Value Ref Range    Report       [facility]  [place] [facility].    Test Date:  2018  Pt Name:    CASE WOODSON            Department: [facility]  MRN:        [id number]                      Room:       -ROOM 7  Gender:     Female                       Technician: ROSALIO  :        1995                   Requested By:ANA MARIA ESTRADA  Order #:    [id number]                    Reading MD:    Measurements  Intervals                                Axis  Rate:       78                           P:          27  KY:         180                          QRS:        3  QRSD:       90                           T:          18  QT:         396  QTc:        452    Interpretive Statements  Sinus rhythm  Left ventricular hypertrophy  Compared to ECG 2017 22:29:41  Left ventricular hypertrophy now present         RADIOLOGY  DX-CHEST-2 VIEWS   Final Result      No active disease.           12 Lead EKG interpreted by me to show: 0119  Normal sinus rhythm  Rate 78  Axis: Normal  Intervals: Normal  Normal T waves  Normal ST segments  My impression of this EKG: No STEMI.     12 Lead EKG interpreted by me to show: 0409  Normal sinus rhythm  Rate 79  Axis: Normal  Intervals: Normal  Normal T waves  Normal ST segments  My impression of this EKG: No STEMI.     COURSE & MEDICAL DECISION MAKING  Pertinent Labs & Imaging studies reviewed by me. (See chart for details)    23-year-old female with past medical history of ESRD and Monday, Wednesday, Friday dialysis presents to the emergency department with chief complaint of chest pain.  Differential diagnosis includes but is not limited to:    #acute chest pain  Troponin/EKG (interpreted by me) without acute ischemia, doubt ACS or AMI. And 3 hour repeat trop/ecg unremarkable.  Hx and physical exam not c/w pericarditis,  no e/o pericardial effusion on US  Given symmetric pulses and no radiation to back dout aortic dissection  Hx and physical exam not c/w esophageal rupture or spasm  Can not exclude anxiety disorder,   gastroesophageal reflux disease, musculoskeletal chest pain, or peptic ulcer disease as possible dx however feel that pt is able to follow up with PCP for further work up.   Hx and CXR (interpreted by me) not suggestive of PNA, PTX  PERC negative and low risk by Wells Criteria, doubt PE.   Patient plans to proceed to dialysis after discharge this morning    FINAL IMPRESSION  Visit Diagnoses     ICD-10-CM   1. Acute chest pain R07.9              Electronically signed by: Mushtaq Ayers, 12/3/2018 1:19 AM

## 2018-12-28 LAB
EKG IMPRESSION: NORMAL
EKG IMPRESSION: NORMAL

## 2019-01-17 ENCOUNTER — APPOINTMENT (RX ONLY)
Dept: URBAN - METROPOLITAN AREA CLINIC 22 | Facility: CLINIC | Age: 24
Setting detail: DERMATOLOGY
End: 2019-01-17

## 2019-01-17 DIAGNOSIS — F42.4 EXCORIATION (SKIN-PICKING) DISORDER: ICD-10-CM

## 2019-01-17 DIAGNOSIS — L70.0 ACNE VULGARIS: ICD-10-CM

## 2019-01-17 PROBLEM — S00.30XA UNSPECIFIED SUPERFICIAL INJURY OF NOSE, INITIAL ENCOUNTER: Status: ACTIVE | Noted: 2019-01-17

## 2019-01-17 PROBLEM — S00.80XA UNSPECIFIED SUPERFICIAL INJURY OF OTHER PART OF HEAD, INITIAL ENCOUNTER: Status: ACTIVE | Noted: 2019-01-17

## 2019-01-17 PROBLEM — I10 ESSENTIAL (PRIMARY) HYPERTENSION: Status: ACTIVE | Noted: 2019-01-17

## 2019-01-17 PROCEDURE — ? COUNSELING

## 2019-01-17 PROCEDURE — ? PRESCRIPTION

## 2019-01-17 PROCEDURE — 99202 OFFICE O/P NEW SF 15 MIN: CPT

## 2019-01-17 PROCEDURE — ? PRESCRIPTION SAMPLES PROVIDED

## 2019-01-17 RX ORDER — DAPSONE 75 MG/G
GEL TOPICAL
Qty: 1 | Refills: 11 | Status: ERX | COMMUNITY
Start: 2019-01-17

## 2019-01-17 RX ORDER — SPIRONOLACTONE 100 MG/1
TABLET, FILM COATED ORAL
Qty: 30 | Refills: 2 | Status: ERX | COMMUNITY
Start: 2019-01-17

## 2019-01-17 RX ADMIN — SPIRONOLACTONE: 100 TABLET, FILM COATED ORAL at 00:00

## 2019-01-17 RX ADMIN — DAPSONE: 75 GEL TOPICAL at 00:00

## 2019-01-17 ASSESSMENT — LOCATION SIMPLE DESCRIPTION DERM
LOCATION SIMPLE: LEFT CHEEK
LOCATION SIMPLE: NOSE
LOCATION SIMPLE: RIGHT CHEEK

## 2019-01-17 ASSESSMENT — LOCATION DETAILED DESCRIPTION DERM
LOCATION DETAILED: RIGHT CENTRAL BUCCAL CHEEK
LOCATION DETAILED: NASAL SUPRATIP
LOCATION DETAILED: LEFT CENTRAL MALAR CHEEK
LOCATION DETAILED: LEFT INFERIOR CENTRAL MALAR CHEEK

## 2019-01-17 ASSESSMENT — LOCATION ZONE DERM
LOCATION ZONE: NOSE
LOCATION ZONE: FACE

## 2019-01-17 NOTE — PROCEDURE: COUNSELING
Patient Specific Counseling (Will Not Stick From Patient To Patient): Pt flares with menses.  Discussed watching for blood pressure changes and call with problems.
Detail Level: Zone
Erythromycin Pregnancy And Lactation Text: This medication is Pregnancy Category B and is considered safe during pregnancy. It is also excreted in breast milk.
Topical Retinoid Pregnancy And Lactation Text: This medication is Pregnancy Category C. It is unknown if this medication is excreted in breast milk.
Doxycycline Pregnancy And Lactation Text: This medication is Pregnancy Category D and not consider safe during pregnancy. It is also excreted in breast milk but is considered safe for shorter treatment courses.
Spironolactone Counseling: Patient advised regarding risks of diarrhea, abdominal pain, hyperkalemia, birth defects (for female patients), liver toxicity and renal toxicity. The patient may need blood work to monitor liver and kidney function and potassium levels while on therapy. The patient verbalized understanding of the proper use and possible adverse effects of spironolactone.  All of the patient's questions and concerns were addressed.
Birth Control Pills Counseling: Birth Control Pill Counseling: I discussed with the patient the potential side effects of OCPs including but not limited to increased risk of stroke, heart attack, thrombophlebitis, deep venous thrombosis, hepatic adenomas, breast changes, GI upset, headaches, and depression.  The patient verbalized understanding of the proper use and possible adverse effects of OCPs. All of the patient's questions and concerns were addressed.
Use Enhanced Medication Counseling?: No
Isotretinoin Pregnancy And Lactation Text: This medication is Pregnancy Category X and is considered extremely dangerous during pregnancy. It is unknown if it is excreted in breast milk.
Tazorac Pregnancy And Lactation Text: This medication is not safe during pregnancy. It is unknown if this medication is excreted in breast milk.
Dapsone Counseling: I discussed with the patient the risks of dapsone including but not limited to hemolytic anemia, agranulocytosis, rashes, methemoglobinemia, kidney failure, peripheral neuropathy, headaches, GI upset, and liver toxicity.  Patients who start dapsone require monitoring including baseline LFTs and weekly CBCs for the first month, then every month thereafter.  The patient verbalized understanding of the proper use and possible adverse effects of dapsone.  All of the patient's questions and concerns were addressed.
Tetracycline Counseling: Patient counseled regarding possible photosensitivity and increased risk for sunburn.  Patient instructed to avoid sunlight, if possible.  When exposed to sunlight, patients should wear protective clothing, sunglasses, and sunscreen.  The patient was instructed to call the office immediately if the following severe adverse effects occur:  hearing changes, easy bruising/bleeding, severe headache, or vision changes.  The patient verbalized understanding of the proper use and possible adverse effects of tetracycline.  All of the patient's questions and concerns were addressed. Patient understands to avoid pregnancy while on therapy due to potential birth defects.
Azithromycin Pregnancy And Lactation Text: This medication is considered safe during pregnancy and is also secreted in breast milk.
High Dose Vitamin A Pregnancy And Lactation Text: High dose vitamin A therapy is contraindicated during pregnancy and breast feeding.
Topical Clindamycin Pregnancy And Lactation Text: This medication is Pregnancy Category B and is considered safe during pregnancy. It is unknown if it is excreted in breast milk.
Erythromycin Counseling:  I discussed with the patient the risks of erythromycin including but not limited to GI upset, allergic reaction, drug rash, diarrhea, increase in liver enzymes, and yeast infections.
Topical Retinoid counseling:  Patient advised to apply a pea-sized amount only at bedtime and wait 30 minutes after washing their face before applying.  If too drying, patient may add a non-comedogenic moisturizer. The patient verbalized understanding of the proper use and possible adverse effects of retinoids.  All of the patient's questions and concerns were addressed.
Doxycycline Counseling:  Patient counseled regarding possible photosensitivity and increased risk for sunburn.  Patient instructed to avoid sunlight, if possible.  When exposed to sunlight, patients should wear protective clothing, sunglasses, and sunscreen.  The patient was instructed to call the office immediately if the following severe adverse effects occur:  hearing changes, easy bruising/bleeding, severe headache, or vision changes.  The patient verbalized understanding of the proper use and possible adverse effects of doxycycline.  All of the patient's questions and concerns were addressed.
Benzoyl Peroxide Counseling: Patient counseled that medicine may cause skin irritation and bleach clothing.  In the event of skin irritation, the patient was advised to reduce the amount of the drug applied or use it less frequently.   The patient verbalized understanding of the proper use and possible adverse effects of benzoyl peroxide.  All of the patient's questions and concerns were addressed.
Topical Sulfur Applications Pregnancy And Lactation Text: This medication is Pregnancy Category C and has an unknown safety profile during pregnancy. It is unknown if this topical medication is excreted in breast milk.
Bactrim Pregnancy And Lactation Text: This medication is Pregnancy Category D and is known to cause fetal risk.  It is also excreted in breast milk.
Minocycline Pregnancy And Lactation Text: This medication is Pregnancy Category D and not consider safe during pregnancy. It is also excreted in breast milk.
Tazorac Counseling:  Patient advised that medication is irritating and drying.  Patient may need to apply sparingly and wash off after an hour before eventually leaving it on overnight.  The patient verbalized understanding of the proper use and possible adverse effects of tazorac.  All of the patient's questions and concerns were addressed.
Isotretinoin Counseling: Patient should get monthly blood tests, not donate blood, not drive at night if vision affected, not share medication, and not undergo elective surgery for 6 months after tx completed. Side effects reviewed, pt to contact office should one occur.
Birth Control Pills Pregnancy And Lactation Text: This medication should be avoided if pregnant and for the first 30 days post-partum.
Spironolactone Pregnancy And Lactation Text: This medication can cause feminization of the male fetus and should be avoided during pregnancy. The active metabolite is also found in breast milk.
Azithromycin Counseling:  I discussed with the patient the risks of azithromycin including but not limited to GI upset, allergic reaction, drug rash, diarrhea, and yeast infections.
Topical Clindamycin Counseling: Patient counseled that this medication may cause skin irritation or allergic reactions.  In the event of skin irritation, the patient was advised to reduce the amount of the drug applied or use it less frequently.   The patient verbalized understanding of the proper use and possible adverse effects of clindamycin.  All of the patient's questions and concerns were addressed.
High Dose Vitamin A Counseling: Side effects reviewed, pt to contact office should one occur.
Dapsone Pregnancy And Lactation Text: This medication is Pregnancy Category C and is not considered safe during pregnancy or breast feeding.
Benzoyl Peroxide Pregnancy And Lactation Text: This medication is Pregnancy Category C. It is unknown if benzoyl peroxide is excreted in breast milk.
Bactrim Counseling:  I discussed with the patient the risks of sulfa antibiotics including but not limited to GI upset, allergic reaction, drug rash, diarrhea, dizziness, photosensitivity, and yeast infections.  Rarely, more serious reactions can occur including but not limited to aplastic anemia, agranulocytosis, methemoglobinemia, blood dyscrasias, liver or kidney failure, lung infiltrates or desquamative/blistering drug rashes.
Minocycline Counseling: Patient advised regarding possible photosensitivity and discoloration of the teeth, skin, lips, tongue and gums.  Patient instructed to avoid sunlight, if possible.  When exposed to sunlight, patients should wear protective clothing, sunglasses, and sunscreen.  The patient was instructed to call the office immediately if the following severe adverse effects occur:  hearing changes, easy bruising/bleeding, severe headache, or vision changes.  The patient verbalized understanding of the proper use and possible adverse effects of minocycline.  All of the patient's questions and concerns were addressed.
Topical Sulfur Applications Counseling: Topical Sulfur Counseling: Patient counseled that this medication may cause skin irritation or allergic reactions.  In the event of skin irritation, the patient was advised to reduce the amount of the drug applied or use it less frequently.   The patient verbalized understanding of the proper use and possible adverse effects of topical sulfur application.  All of the patient's questions and concerns were addressed.
Patient Specific Counseling (Will Not Stick From Patient To Patient): Cover areas if cannot stop scratching.

## 2019-02-20 ENCOUNTER — APPOINTMENT (RX ONLY)
Dept: URBAN - METROPOLITAN AREA CLINIC 22 | Facility: CLINIC | Age: 24
Setting detail: DERMATOLOGY
End: 2019-02-20

## 2019-02-20 DIAGNOSIS — L70.0 ACNE VULGARIS: ICD-10-CM

## 2019-02-20 PROCEDURE — ? PRESCRIPTION

## 2019-02-20 RX ORDER — DAPSONE 50 MG/G
GEL TOPICAL
Qty: 1 | Refills: 11 | Status: ERX | COMMUNITY
Start: 2019-02-20

## 2019-02-20 RX ADMIN — DAPSONE: 50 GEL TOPICAL at 00:00

## 2019-03-08 RX ORDER — ATENOLOL 25 MG/1
TABLET ORAL
Qty: 90 TAB | Refills: 3 | Status: ON HOLD
Start: 2019-03-08 | End: 2020-01-26

## 2019-03-20 DIAGNOSIS — Z01.812 PRE-OPERATIVE LABORATORY EXAMINATION: ICD-10-CM

## 2019-03-20 LAB
ANION GAP SERPL CALC-SCNC: 8 MMOL/L (ref 0–11.9)
BASOPHILS # BLD AUTO: 0.9 % (ref 0–1.8)
BASOPHILS # BLD: 0.04 K/UL (ref 0–0.12)
BUN SERPL-MCNC: 18 MG/DL (ref 8–22)
CALCIUM SERPL-MCNC: 9.5 MG/DL (ref 8.5–10.5)
CHLORIDE SERPL-SCNC: 89 MMOL/L (ref 96–112)
CO2 SERPL-SCNC: 34 MMOL/L (ref 20–33)
CREAT SERPL-MCNC: 3.94 MG/DL (ref 0.5–1.4)
EOSINOPHIL # BLD AUTO: 0.02 K/UL (ref 0–0.51)
EOSINOPHIL NFR BLD: 0.4 % (ref 0–6.9)
ERYTHROCYTE [DISTWIDTH] IN BLOOD BY AUTOMATED COUNT: 49.3 FL (ref 35.9–50)
GLUCOSE SERPL-MCNC: 87 MG/DL (ref 65–99)
HCT VFR BLD AUTO: 34.9 % (ref 37–47)
HGB BLD-MCNC: 11.4 G/DL (ref 12–16)
IMM GRANULOCYTES # BLD AUTO: 0.03 K/UL (ref 0–0.11)
IMM GRANULOCYTES NFR BLD AUTO: 0.7 % (ref 0–0.9)
LYMPHOCYTES # BLD AUTO: 0.77 K/UL (ref 1–4.8)
LYMPHOCYTES NFR BLD: 16.8 % (ref 22–41)
MCH RBC QN AUTO: 29.3 PG (ref 27–33)
MCHC RBC AUTO-ENTMCNC: 32.7 G/DL (ref 33.6–35)
MCV RBC AUTO: 89.7 FL (ref 81.4–97.8)
MONOCYTES # BLD AUTO: 0.27 K/UL (ref 0–0.85)
MONOCYTES NFR BLD AUTO: 5.9 % (ref 0–13.4)
NEUTROPHILS # BLD AUTO: 3.46 K/UL (ref 2–7.15)
NEUTROPHILS NFR BLD: 75.3 % (ref 44–72)
NRBC # BLD AUTO: 0 K/UL
NRBC BLD-RTO: 0 /100 WBC
PLATELET # BLD AUTO: 178 K/UL (ref 164–446)
PMV BLD AUTO: 9.4 FL (ref 9–12.9)
POTASSIUM SERPL-SCNC: 3.5 MMOL/L (ref 3.6–5.5)
RBC # BLD AUTO: 3.89 M/UL (ref 4.2–5.4)
SODIUM SERPL-SCNC: 131 MMOL/L (ref 135–145)
WBC # BLD AUTO: 4.6 K/UL (ref 4.8–10.8)

## 2019-03-20 PROCEDURE — 85025 COMPLETE CBC W/AUTO DIFF WBC: CPT

## 2019-03-20 PROCEDURE — 36415 COLL VENOUS BLD VENIPUNCTURE: CPT

## 2019-03-20 PROCEDURE — 80048 BASIC METABOLIC PNL TOTAL CA: CPT

## 2019-03-20 RX ORDER — OMEPRAZOLE 20 MG/1
20 TABLET, DELAYED RELEASE ORAL EVERY MORNING
COMMUNITY
End: 2019-10-21 | Stop reason: SDUPTHER

## 2019-03-21 ENCOUNTER — APPOINTMENT (RX ONLY)
Dept: URBAN - METROPOLITAN AREA CLINIC 22 | Facility: CLINIC | Age: 24
Setting detail: DERMATOLOGY
End: 2019-03-21

## 2019-03-21 DIAGNOSIS — L70.0 ACNE VULGARIS: ICD-10-CM

## 2019-03-21 DIAGNOSIS — L70.5 ACNÉ EXCORIÉE: ICD-10-CM

## 2019-03-21 PROCEDURE — 99213 OFFICE O/P EST LOW 20 MIN: CPT

## 2019-03-21 PROCEDURE — ? COUNSELING

## 2019-03-21 PROCEDURE — ? ADDITIONAL NOTES

## 2019-03-21 PROCEDURE — ? PRESCRIPTION

## 2019-03-21 RX ORDER — LACTOBACILLUS RHAMNOSUS GG 10B CELL
CAPSULE ORAL
Qty: 100 | Refills: 3 | Status: ERX | COMMUNITY
Start: 2019-03-21

## 2019-03-21 RX ORDER — ADAPALENE 1 MG/G
CREAM TOPICAL
Qty: 1 | Refills: 1 | Status: ERX | COMMUNITY
Start: 2019-03-21

## 2019-03-21 RX ADMIN — Medication 1: at 00:00

## 2019-03-21 RX ADMIN — ADAPALENE 1: 1 CREAM TOPICAL at 00:00

## 2019-03-21 NOTE — PROCEDURE: ADDITIONAL NOTES
Additional Notes: Restart doxycycline 100mg bid daily and start probiotic daily and take the medication with food\\nIf this does not help will have to contact nephrologist to see if other antibiotic ok given on dialysis
Detail Level: Detailed

## 2019-03-21 NOTE — PROCEDURE: COUNSELING
Bactrim Counseling:  I discussed with the patient the risks of sulfa antibiotics including but not limited to GI upset, allergic reaction, drug rash, diarrhea, dizziness, photosensitivity, and yeast infections.  Rarely, more serious reactions can occur including but not limited to aplastic anemia, agranulocytosis, methemoglobinemia, blood dyscrasias, liver or kidney failure, lung infiltrates or desquamative/blistering drug rashes.
Minocycline Counseling: Patient advised regarding possible photosensitivity and discoloration of the teeth, skin, lips, tongue and gums.  Patient instructed to avoid sunlight, if possible.  When exposed to sunlight, patients should wear protective clothing, sunglasses, and sunscreen.  The patient was instructed to call the office immediately if the following severe adverse effects occur:  hearing changes, easy bruising/bleeding, severe headache, or vision changes.  The patient verbalized understanding of the proper use and possible adverse effects of minocycline.  All of the patient's questions and concerns were addressed.
Dapsone Pregnancy And Lactation Text: This medication is Pregnancy Category C and is not considered safe during pregnancy or breast feeding.
Tetracycline Pregnancy And Lactation Text: This medication is Pregnancy Category D and not consider safe during pregnancy. It is also excreted in breast milk.
Tetracycline Counseling: Patient counseled regarding possible photosensitivity and increased risk for sunburn.  Patient instructed to avoid sunlight, if possible.  When exposed to sunlight, patients should wear protective clothing, sunglasses, and sunscreen.  The patient was instructed to call the office immediately if the following severe adverse effects occur:  hearing changes, easy bruising/bleeding, severe headache, or vision changes.  The patient verbalized understanding of the proper use and possible adverse effects of tetracycline.  All of the patient's questions and concerns were addressed. Patient understands to avoid pregnancy while on therapy due to potential birth defects.
Dapsone Counseling: I discussed with the patient the risks of dapsone including but not limited to hemolytic anemia, agranulocytosis, rashes, methemoglobinemia, kidney failure, peripheral neuropathy, headaches, GI upset, and liver toxicity.  Patients who start dapsone require monitoring including baseline LFTs and weekly CBCs for the first month, then every month thereafter.  The patient verbalized understanding of the proper use and possible adverse effects of dapsone.  All of the patient's questions and concerns were addressed.
Topical Retinoid Pregnancy And Lactation Text: This medication is Pregnancy Category C. It is unknown if this medication is excreted in breast milk.
Erythromycin Pregnancy And Lactation Text: This medication is Pregnancy Category B and is considered safe during pregnancy. It is also excreted in breast milk.
Topical Sulfur Applications Counseling: Topical Sulfur Counseling: Patient counseled that this medication may cause skin irritation or allergic reactions.  In the event of skin irritation, the patient was advised to reduce the amount of the drug applied or use it less frequently.   The patient verbalized understanding of the proper use and possible adverse effects of topical sulfur application.  All of the patient's questions and concerns were addressed.
Detail Level: Zone
Bactrim Pregnancy And Lactation Text: This medication is Pregnancy Category D and is known to cause fetal risk.  It is also excreted in breast milk.
Benzoyl Peroxide Counseling: Patient counseled that medicine may cause skin irritation and bleach clothing.  In the event of skin irritation, the patient was advised to reduce the amount of the drug applied or use it less frequently.   The patient verbalized understanding of the proper use and possible adverse effects of benzoyl peroxide.  All of the patient's questions and concerns were addressed.
Doxycycline Counseling:  Patient counseled regarding possible photosensitivity and increased risk for sunburn.  Patient instructed to avoid sunlight, if possible.  When exposed to sunlight, patients should wear protective clothing, sunglasses, and sunscreen.  The patient was instructed to call the office immediately if the following severe adverse effects occur:  hearing changes, easy bruising/bleeding, severe headache, or vision changes.  The patient verbalized understanding of the proper use and possible adverse effects of doxycycline.  All of the patient's questions and concerns were addressed.
Tazorac Pregnancy And Lactation Text: This medication is not safe during pregnancy. It is unknown if this medication is excreted in breast milk.
Isotretinoin Counseling: Patient should get monthly blood tests, not donate blood, not drive at night if vision affected, not share medication, and not undergo elective surgery for 6 months after tx completed. Side effects reviewed, pt to contact office should one occur.
Tazorac Counseling:  Patient advised that medication is irritating and drying.  Patient may need to apply sparingly and wash off after an hour before eventually leaving it on overnight.  The patient verbalized understanding of the proper use and possible adverse effects of tazorac.  All of the patient's questions and concerns were addressed.
Topical Sulfur Applications Pregnancy And Lactation Text: This medication is Pregnancy Category C and has an unknown safety profile during pregnancy. It is unknown if this topical medication is excreted in breast milk.
Doxycycline Pregnancy And Lactation Text: This medication is Pregnancy Category D and not consider safe during pregnancy. It is also excreted in breast milk but is considered safe for shorter treatment courses.
Benzoyl Peroxide Pregnancy And Lactation Text: This medication is Pregnancy Category C. It is unknown if benzoyl peroxide is excreted in breast milk.
Topical Clindamycin Counseling: Patient counseled that this medication may cause skin irritation or allergic reactions.  In the event of skin irritation, the patient was advised to reduce the amount of the drug applied or use it less frequently.   The patient verbalized understanding of the proper use and possible adverse effects of clindamycin.  All of the patient's questions and concerns were addressed.
Use Enhanced Medication Counseling?: No
Isotretinoin Pregnancy And Lactation Text: This medication is Pregnancy Category X and is considered extremely dangerous during pregnancy. It is unknown if it is excreted in breast milk.
Birth Control Pills Counseling: Birth Control Pill Counseling: I discussed with the patient the potential side effects of OCPs including but not limited to increased risk of stroke, heart attack, thrombophlebitis, deep venous thrombosis, hepatic adenomas, breast changes, GI upset, headaches, and depression.  The patient verbalized understanding of the proper use and possible adverse effects of OCPs. All of the patient's questions and concerns were addressed.
Spironolactone Counseling: Patient advised regarding risks of diarrhea, abdominal pain, hyperkalemia, birth defects (for female patients), liver toxicity and renal toxicity. The patient may need blood work to monitor liver and kidney function and potassium levels while on therapy. The patient verbalized understanding of the proper use and possible adverse effects of spironolactone.  All of the patient's questions and concerns were addressed.
Topical Clindamycin Pregnancy And Lactation Text: This medication is Pregnancy Category B and is considered safe during pregnancy. It is unknown if it is excreted in breast milk.
Azithromycin Pregnancy And Lactation Text: This medication is considered safe during pregnancy and is also secreted in breast milk.
High Dose Vitamin A Pregnancy And Lactation Text: High dose vitamin A therapy is contraindicated during pregnancy and breast feeding.
Azithromycin Counseling:  I discussed with the patient the risks of azithromycin including but not limited to GI upset, allergic reaction, drug rash, diarrhea, and yeast infections.
High Dose Vitamin A Counseling: Side effects reviewed, pt to contact office should one occur.
Spironolactone Pregnancy And Lactation Text: This medication can cause feminization of the male fetus and should be avoided during pregnancy. The active metabolite is also found in breast milk.
Birth Control Pills Pregnancy And Lactation Text: This medication should be avoided if pregnant and for the first 30 days post-partum.
Erythromycin Counseling:  I discussed with the patient the risks of erythromycin including but not limited to GI upset, allergic reaction, drug rash, diarrhea, increase in liver enzymes, and yeast infections.
Topical Retinoid counseling:  Patient advised to apply a pea-sized amount only at bedtime and wait 30 minutes after washing their face before applying.  If too drying, patient may add a non-comedogenic moisturizer. The patient verbalized understanding of the proper use and possible adverse effects of retinoids.  All of the patient's questions and concerns were addressed.

## 2019-03-27 ENCOUNTER — HOSPITAL ENCOUNTER (OUTPATIENT)
Facility: MEDICAL CENTER | Age: 24
End: 2019-03-27
Attending: SURGERY | Admitting: SURGERY
Payer: COMMERCIAL

## 2019-03-27 ENCOUNTER — APPOINTMENT (OUTPATIENT)
Dept: RADIOLOGY | Facility: MEDICAL CENTER | Age: 24
End: 2019-03-27
Attending: SURGERY
Payer: COMMERCIAL

## 2019-03-27 VITALS
RESPIRATION RATE: 18 BRPM | WEIGHT: 146 LBS | BODY MASS INDEX: 22.91 KG/M2 | TEMPERATURE: 97.1 F | SYSTOLIC BLOOD PRESSURE: 91 MMHG | HEART RATE: 95 BPM | DIASTOLIC BLOOD PRESSURE: 64 MMHG | HEIGHT: 67 IN | OXYGEN SATURATION: 97 %

## 2019-03-27 DIAGNOSIS — Z99.2 DEPENDENCE ON RENAL DIALYSIS (HCC): ICD-10-CM

## 2019-03-27 DIAGNOSIS — N18.6 END STAGE RENAL DISEASE (HCC): ICD-10-CM

## 2019-03-27 PROBLEM — N18.5 CHRONIC KIDNEY DISEASE (CKD) STAGE G5/A1, GLOMERULAR FILTRATION RATE (GFR) LESS THAN OR EQUAL TO 15 ML/MIN/1.73 SQUARE METER AND ALBUMINURIA CREATININE RATIO LESS THAN 30 MG/G (HCC): Status: ACTIVE | Noted: 2017-08-02

## 2019-03-27 LAB
ANION GAP SERPL CALC-SCNC: 11 MMOL/L (ref 0–11.9)
BUN SERPL-MCNC: 17 MG/DL (ref 8–22)
CALCIUM SERPL-MCNC: 9.3 MG/DL (ref 8.5–10.5)
CHLORIDE SERPL-SCNC: 91 MMOL/L (ref 96–112)
CO2 SERPL-SCNC: 32 MMOL/L (ref 20–33)
CREAT SERPL-MCNC: 3.89 MG/DL (ref 0.5–1.4)
GLUCOSE SERPL-MCNC: 79 MG/DL (ref 65–99)
HCG SERPL QL: NEGATIVE
POTASSIUM SERPL-SCNC: 3.9 MMOL/L (ref 3.6–5.5)
SODIUM SERPL-SCNC: 134 MMOL/L (ref 135–145)

## 2019-03-27 PROCEDURE — 160002 HCHG RECOVERY MINUTES (STAT)

## 2019-03-27 PROCEDURE — 99153 MOD SED SAME PHYS/QHP EA: CPT

## 2019-03-27 PROCEDURE — 700111 HCHG RX REV CODE 636 W/ 250 OVERRIDE (IP)

## 2019-03-27 PROCEDURE — 700117 HCHG RX CONTRAST REV CODE 255: Performed by: SURGERY

## 2019-03-27 PROCEDURE — 84703 CHORIONIC GONADOTROPIN ASSAY: CPT

## 2019-03-27 PROCEDURE — 80048 BASIC METABOLIC PNL TOTAL CA: CPT

## 2019-03-27 RX ORDER — ONDANSETRON 2 MG/ML
4 INJECTION INTRAMUSCULAR; INTRAVENOUS PRN
Status: DISCONTINUED | OUTPATIENT
Start: 2019-03-27 | End: 2019-03-27 | Stop reason: HOSPADM

## 2019-03-27 RX ORDER — SODIUM CHLORIDE 9 MG/ML
500 INJECTION, SOLUTION INTRAVENOUS
Status: DISCONTINUED | OUTPATIENT
Start: 2019-03-27 | End: 2019-03-27 | Stop reason: HOSPADM

## 2019-03-27 RX ORDER — MIDAZOLAM HYDROCHLORIDE 1 MG/ML
INJECTION INTRAMUSCULAR; INTRAVENOUS
Status: COMPLETED
Start: 2019-03-27 | End: 2019-03-27

## 2019-03-27 RX ORDER — IODIXANOL 270 MG/ML
8 INJECTION, SOLUTION INTRAVASCULAR ONCE
Status: COMPLETED | OUTPATIENT
Start: 2019-03-27 | End: 2019-03-27

## 2019-03-27 RX ORDER — MIDAZOLAM HYDROCHLORIDE 1 MG/ML
.5-2 INJECTION INTRAMUSCULAR; INTRAVENOUS PRN
Status: DISCONTINUED | OUTPATIENT
Start: 2019-03-27 | End: 2019-03-27 | Stop reason: HOSPADM

## 2019-03-27 RX ADMIN — MIDAZOLAM HYDROCHLORIDE 1 MG: 1 INJECTION, SOLUTION INTRAMUSCULAR; INTRAVENOUS at 14:54

## 2019-03-27 RX ADMIN — MIDAZOLAM HYDROCHLORIDE 1 MG: 1 INJECTION INTRAMUSCULAR; INTRAVENOUS at 14:46

## 2019-03-27 RX ADMIN — IODIXANOL 8 ML: 270 INJECTION, SOLUTION INTRAVASCULAR at 15:30

## 2019-03-27 RX ADMIN — MIDAZOLAM HYDROCHLORIDE 1 MG: 1 INJECTION INTRAMUSCULAR; INTRAVENOUS at 14:39

## 2019-03-27 RX ADMIN — MIDAZOLAM HYDROCHLORIDE 1 MG: 1 INJECTION INTRAMUSCULAR; INTRAVENOUS at 15:04

## 2019-03-27 RX ADMIN — MIDAZOLAM HYDROCHLORIDE 1 MG: 1 INJECTION INTRAMUSCULAR; INTRAVENOUS at 14:54

## 2019-03-27 RX ADMIN — FENTANYL CITRATE 25 MCG: 50 INJECTION INTRAMUSCULAR; INTRAVENOUS at 14:39

## 2019-03-27 RX ADMIN — MIDAZOLAM HYDROCHLORIDE 1 MG: 1 INJECTION, SOLUTION INTRAMUSCULAR; INTRAVENOUS at 14:39

## 2019-03-27 NOTE — OP REPORT
DATE OF SERVICE:  03/27/2019    PREOPERATIVE DIAGNOSIS:  Pulsatile right arm fistula.    POSTPROCEDURE DIAGNOSIS:  Pulsatile right arm fistula.    PROCEDURE:  Right arm fistulogram with angioplasty.    SURGEON:  Deepti Cyr MD    ANESTHESIOLOGIST:  ____    ANESTHESIA:  Conscious sedation.    INDICATIONS:  The patient is a 23-year-old woman with a right arm fistula.  It   has been treated in the past by Dr. Livingston and she returns today with   increased bleeding and pulsatility as well as high pressures.  She is brought   to the angio suite for treatment.  Risks and benefits were explained and   include bleeding, infection, arteriovenous thrombosis.  She understands and   agrees to proceed.    DESCRIPTION OF PROCEDURE:  Patient was taken to the angio suite, placed in a   supine position.  After adequate sedation, the right arm was prepped and   draped in the usual sterile fashion with Chloraseptic prep, cloth towels, and   paper drapes.  Access to the fistula was obtained with a micropuncture needle.    A 0.018 guidewire was advanced followed by a 0.035 guidewire and then a   7-Austrian sheath.  Over a 0.018 wire, I used an 8 mm x 2 cm cutting balloon to   perform angioplasty at the upper biceps and at the cephalic subclavian   junction as well as the subclavian vein.  Eventually, the cutting balloon did   appear to inflate fully.    I then exchanged for a 10 mm balloon, which dilated the area in question, but   would not fully inflate at the upper biceps.  The more proximal stenoses did   respond well to full inflation of the 10 mm balloon.  However, on final   fistulogram, the pliable venous stenoses still appeared relatively under   dilated.  I felt there was nothing further I could achieve by increased   balloon diameter and therefore, balloons, catheters, and sheaths were removed.    There were no apparent complications.       ____________________________________     MD BARI Rankin / MADHU    DD:   03/27/2019 15:15:32  DT:  03/27/2019 15:27:18    D#:  [id number]  Job#:  [id number]

## 2019-03-27 NOTE — PROGRESS NOTES
IR Nursing Note:    Patient consented by Dr. Cyr, all questions answered. Dr. Cyr performed right arm fistulogram with angioplasty. The pt tolerated the procedure well; ETCo2 baseline 38, with consistent waveform during the procedure.  Gauzee and tegaderm applied to right arm access site, CDI and soft; pressure held x 5 minutes.  Pt alert  and verbally appropriate post procedure, vital signs stable during procedure  and transport, see flow sheet for vital signs.  Report given to MICHELLE Beth.  RN transported pt to [facility] Recovery with Sao2 monitor.

## 2019-03-27 NOTE — OR SURGEON
Immediate Post OP Note    PreOp Diagnosis: Pulsatile right arm fistula    PostOp Diagnosis: Same    Procedure(s):  RIGHT ARM FISTULOGRAM WITH ANGIOPLASTY    Surgeon(s):MARIA VICTORIA Cyr MD  [facility]    Anesthesiologist/Type of Anesthesia:MICHELLE Lu; Conscious sedation    Estimated Blood Loss: minimal    Findings:  Stenosis at the upper biceps, at the cephalic subclavian junction and subclavian vein    Complications: none    [id number]    3/27/2019 3:10 PM Deepti Cyr M.D.

## 2019-03-27 NOTE — DISCHARGE INSTRUCTIONS
Fistulogram  Introduction  A fistulogram is an X-ray test to look inside the site where your blood is removed and returned to your body during dialysis (dialysis fistula). Your fistula allows easy and effective access, but sometimes problems can occur, such as narrowing or blockages. A narrowing or blockage can reduce the effectiveness of dialysis. A fistulogram helps to detect these problems. It also lets your health care provider know of any additional treatment you may need.  Tell a health care provider about:  · Any allergies you have.  · All medicines you are taking, including vitamins, herbs, eye drops, creams, and over-the-counter medicines.  · Any problems you or family members have had with anesthetic medicines.  · Any reactions you have had to contrast dye.  · Any blood disorders you have.  · Any surgeries you have had.  · Any medical conditions you have.  · Pain, redness, or swelling in your limb with the dialysis fistula.  · Any bleeding from your dialysis fistula.  · Any of the following in the part of your body where the dialysis fistula leads:  ¨ Numbness.  ¨ Tingling.  ¨ Cold feeling.  ¨ Bluish or pale white in color.  What are the risks?  Generally, a fistulogram is a safe procedure. However, problems can occur and include:  · Bleeding.  · Infection.  · A blood clot in the dialysis fistula.  · A blood clot that travels to your lungs (pulmonary embolism).  What happens before the procedure?  · Your health care provider may do some blood or urine tests or both. These will help your health care provider learn how well your kidneys and liver are working and how well your blood clots.  · Ask your health care provider about:  ¨ Changing or stopping your regular medicines. This is especially important if you are taking diabetes medicines or blood thinners.  ¨ Taking medicines such as aspirin and ibuprofen. These medicines can thin your blood. Do not take these medicines before your procedure if your health  care provider asks you not to.  · Do not eat or drink anything after midnight on the night before the procedure or as directed by your health care provider.  · Ask your health care provider if you will be able to go home after the procedure. Some people stay overnight in the hospital.  · Plan to have someone take you home after the procedure.  What happens during the procedure?  · A needle will be inserted into your arm. It will be used to give you medicine. Medicines given may include:  ¨ Numbing medicine (local anesthetic) to numb an area on the limb with your fistula.  ¨ A medicine to help you relax (sedative).  ¨ A medicine that makes you fall asleep (general anesthetic).  · When your skin is numb, a needle will be inserted into your vein.  · A thin, flexible tube (catheter) will be inserted into the needle and guided to the narrowed area.  · Dye (contrast material) will be injected into the catheter. As the contrast material passes through your body, you may feel warm.  · X-rays will be taken. The contrast material will show where any narrowing or blockages are.  · A guide wire and balloon-tipped catheter will be advanced to the blockage site.  · The balloon will be inflated for a short period of time. The balloon may be inflated several times at this site, or the balloon may be moved to other sites.  · If your health care provider determines the clot will be best treated by a clot-dissolving medicine (thrombolysis), this medicine can be delivered through the catheter instead of using the balloon.  · A mechanical device at the end of the catheter can also be used to break up the clot (thrombectomy).  · At the end of the procedure, the catheter will be removed.  · In some cases, the catheter site will be closed with a stitch or two.  · Pressure will be applied to stop any bleeding, and your skin will be covered with a bandage (dressing).  What happens after the procedure?  · You will stay in a recovery area  until the medicines have worn off.  · You will stay in bed for several hours.  · As you begin to feel better, you will be offered ice and beverages. You may be given food.  · Your health care provider will check your blood pressure and pulse and watch your access site.  · When you can walk, drink, eat, and use the bathroom, you may be discharged.  This information is not intended to replace advice given to you by your health care provider. Make sure you discuss any questions you have with your health care provider.  Document Released: 05/04/2015 Document Revised: 05/25/2017 Document Reviewed: 02/06/2015  © 2017 Elsevier        ACTIVITY: Rest and take it easy for the first 24 hours.  A responsible adult is recommended to remain with you during that time.  It is normal to feel sleepy.  We encourage you to not do anything that requires balance, judgment or coordination.    MILD FLU-LIKE SYMPTOMS ARE NORMAL. YOU MAY EXPERIENCE GENERALIZED MUSCLE ACHES, THROAT IRRITATION, HEADACHE AND/OR SOME NAUSEA.    FOR 24 HOURS DO NOT:  Drive, operate machinery or run household appliances.  Drink beer or alcoholic beverages.   Make important decisions or sign legal documents.    SPECIAL INSTRUCTIONS: Minimal activity for two day. No pushing, pulling, or heavy lifting 10 pounds for two weeks. Keep dressing on for 2 days then may remove. Okay to shower after 2 days. No bathing or submerging arm for 10 days. Resume Medications as prescribed.     DIET: To avoid nausea, slowly advance diet as tolerated, avoiding spicy or greasy foods for the first day.  Add more substantial food to your diet according to your physician's instructions.  Babies can be fed formula or breast milk as soon as they are hungry.  INCREASE FLUIDS AND FIBER TO AVOID CONSTIPATION.    SURGICAL DRESSING/BATHING: Minimal activity for two day. No pushing, pulling, or heavy lifting 10 pounds for two weeks. Keep dressing on for 2 days then may remove. Okay to shower after  2 days. No bathing or submerging arm for 10 days.     FOLLOW-UP APPOINTMENT:  A follow-up appointment should be arranged with Dr. Cyr; call to [phone] schedule.    You should CALL YOUR PHYSICIAN if you develop:  Fever greater than 101 degrees F.  Pain not relieved by medication, or persistent nausea or vomiting.  Excessive bleeding (blood soaking through dressing) or unexpected drainage from the wound.  Extreme redness or swelling around the incision site, drainage of pus or foul smelling drainage.  Inability to urinate or empty your bladder within 8 hours.  Problems with breathing or chest pain.    You should call 911 if you develop problems with breathing or chest pain.  If you are unable to contact your doctor or surgical center, you should go to the nearest emergency room or urgent care center.  Physician's telephone #: [phone]    If any questions arise, call your doctor.  If your doctor is not available, please feel free to call the Surgical Center at [phone].  The Center is open Monday through Friday from 7AM to 7PM.  You can also call the [facility] HOTLINE open 24 hours/day, 7 days/week and speak to a nurse at [phone], or toll free at [phone].    A registered nurse may call you a few days after your surgery to see how you are doing after your procedure.    MEDICATIONS: Resume taking daily medication.  Take prescribed pain medication with food.  If no medication is prescribed, you may take non-aspirin pain medication if needed.  PAIN MEDICATION CAN BE VERY CONSTIPATING.  Take a stool softener or laxative such as senokot, pericolace, or milk of magnesia if needed.      If your physician has prescribed pain medication that includes Acetaminophen (Tylenol), do not take additional Acetaminophen (Tylenol) while taking the prescribed medication.    Depression / Suicide Risk    As you are discharged from this [facility] facility, it is important to learn how to keep safe from  harming yourself.    Recognize the warning signs:  · Abrupt changes in personality, positive or negative- including increase in energy   · Giving away possessions  · Change in eating patterns- significant weight changes-  positive or negative  · Change in sleeping patterns- unable to sleep or sleeping all the time   · Unwillingness or inability to communicate  · Depression  · Unusual sadness, discouragement and loneliness  · Talk of wanting to die  · Neglect of personal appearance   · Rebelliousness- reckless behavior  · Withdrawal from people/activities they love  · Confusion- inability to concentrate     If you or a loved one observes any of these behaviors or has concerns about self-harm, here's what you can do:  · Talk about it- your feelings and reasons for harming yourself  · Remove any means that you might use to hurt yourself (examples: pills, rope, extension cords, firearm)  · Get professional help from the community (Mental Health, Substance Abuse, psychological counseling)  · Do not be alone:Call your Safe Contact- someone whom you trust who will be there for you.  · Call your local CRISIS HOTLINE 194-7901 or 676-148-2400  · Call your local Children's Mobile Crisis Response Team [place] (649) 592-2819 or www.[employer]  · Call the toll free National Suicide Prevention Hotlines   · National Suicide Prevention Lifeline 915-056-LJBD (5016)  · National Hope Line Network 800-SUICIDE (804-0667)

## 2019-03-27 NOTE — OR NURSING
1523- Pt arrived from IR with RN. JADEN. On RA. Pt states she is moderate pain, IR RN just gave medication for intervention. See MAR. Site is CDI.Thrill and bruit present. Pt is lethargic, but easily arousable. PIV saline locked.   1525-Right arm elevated and wrapped in warm blankets. POC discussed. Pt educated to minimize use of right arm/bed rest for 2 hours.   1535- Mother brought back to room. Site is CDI.   1545-pt given food and water. Tolerated it well. Site is CDI.   1600-Site CDI.   1615- Report given to Nidhi MARTINEZ. Site is CDI.

## 2019-03-28 NOTE — OR NURSING
1615 Report received from Ignacia MARTINEZ.  Patient resting in gurney, denies discomfort, site clean, dry and soft.  1700 Access site clean, dry and soft.  Bruit and thrill noted.  All lines and monitors discontinued. Reviewed discharge paperwork with pt and mom. Discussed diet, activity, medications, follow up care and worsening symptoms. No questions at this time.   1715 Pt discharged home with mom via wheelchair by CNA.

## 2019-03-31 ENCOUNTER — HOSPITAL ENCOUNTER (EMERGENCY)
Facility: MEDICAL CENTER | Age: 24
End: 2019-04-01
Attending: EMERGENCY MEDICINE
Payer: COMMERCIAL

## 2019-03-31 DIAGNOSIS — N18.6 CHRONIC KIDNEY DISEASE ON CHRONIC DIALYSIS (HCC): ICD-10-CM

## 2019-03-31 DIAGNOSIS — R21 FACIAL RASH: ICD-10-CM

## 2019-03-31 DIAGNOSIS — F41.0 PANIC ATTACK: ICD-10-CM

## 2019-03-31 DIAGNOSIS — G44.209 TENSION HEADACHE: ICD-10-CM

## 2019-03-31 DIAGNOSIS — Z99.2 CHRONIC KIDNEY DISEASE ON CHRONIC DIALYSIS (HCC): ICD-10-CM

## 2019-03-31 LAB
APPEARANCE UR: CLEAR
BACTERIA #/AREA URNS HPF: ABNORMAL /HPF
BILIRUB UR QL STRIP.AUTO: NEGATIVE
COLOR UR: YELLOW
EPI CELLS #/AREA URNS HPF: ABNORMAL /HPF
GLUCOSE UR STRIP.AUTO-MCNC: NEGATIVE MG/DL
HCG UR QL: NEGATIVE
KETONES UR STRIP.AUTO-MCNC: NEGATIVE MG/DL
LEUKOCYTE ESTERASE UR QL STRIP.AUTO: NEGATIVE
MICRO URNS: ABNORMAL
NITRITE UR QL STRIP.AUTO: NEGATIVE
PH UR STRIP.AUTO: 8.5 [PH]
PROT UR QL STRIP: 30 MG/DL
RBC # URNS HPF: ABNORMAL /HPF
RBC UR QL AUTO: ABNORMAL
SP GR UR REFRACTOMETRY: 1.01
SP GR UR STRIP.AUTO: 1.01
WBC #/AREA URNS HPF: ABNORMAL /HPF

## 2019-03-31 PROCEDURE — 81025 URINE PREGNANCY TEST: CPT

## 2019-03-31 PROCEDURE — A9270 NON-COVERED ITEM OR SERVICE: HCPCS | Performed by: EMERGENCY MEDICINE

## 2019-03-31 PROCEDURE — 81001 URINALYSIS AUTO W/SCOPE: CPT

## 2019-03-31 PROCEDURE — 700102 HCHG RX REV CODE 250 W/ 637 OVERRIDE(OP): Performed by: EMERGENCY MEDICINE

## 2019-03-31 PROCEDURE — 99284 EMERGENCY DEPT VISIT MOD MDM: CPT

## 2019-03-31 RX ORDER — ACETAMINOPHEN 500 MG
1000 TABLET ORAL ONCE
Status: COMPLETED | OUTPATIENT
Start: 2019-03-31 | End: 2019-03-31

## 2019-03-31 RX ORDER — LORAZEPAM 1 MG/1
1 TABLET ORAL ONCE
Status: COMPLETED | OUTPATIENT
Start: 2019-03-31 | End: 2019-03-31

## 2019-03-31 RX ADMIN — LORAZEPAM 1 MG: 1 TABLET ORAL at 23:22

## 2019-03-31 RX ADMIN — ACETAMINOPHEN 1000 MG: 500 TABLET, FILM COATED ORAL at 23:22

## 2019-04-01 VITALS
OXYGEN SATURATION: 98 % | DIASTOLIC BLOOD PRESSURE: 95 MMHG | WEIGHT: 150.35 LBS | BODY MASS INDEX: 23.6 KG/M2 | HEART RATE: 84 BPM | SYSTOLIC BLOOD PRESSURE: 134 MMHG | HEIGHT: 67 IN | RESPIRATION RATE: 16 BRPM | TEMPERATURE: 98.2 F

## 2019-04-01 NOTE — ED PROVIDER NOTES
"CHIEF COMPLAINT  Chief Complaint   Patient presents with   • Anxiety     Pt states that her anxiety started about 1 hour ago.    • Headache     started after pt developed anxiety. Described as bilateral throbbing on her temples that radiates behind her ears.    • Nausea     denies vomiting started with the headache.       HPI  Lily JOJO Nicole is a 23 y.o. female who presents tonight with a chief complaint of panic attack that started about an hour prior to arrival.  Patient states that she just started thinking about all of her chronic illnesses and bunch of other things and it just made her very anxious.  She then developed a headache after that and her head is throbbing behind her ears.  She has some mild nausea with no vomiting.  She denies any recent fever, chills.  She does have a history of lupus and is on multiple medications.  She is also on dialysis Mondays, Wednesdays and Fridays.    REVIEW OF SYSTEMS  See HPI for further details. All other system reviews are negative.    PAST MEDICAL HISTORY  Past Medical History:   Diagnosis Date   • Anemia 01/17/2018   • AVF (arteriovenous fistula) (Formerly [facility])     Right Arm   • Dialysis patient (Formerly [facility])      dialysis, M,W,F Elizabeth/Joni   • ESRD (end stage renal disease) on dialysis (Formerly [facility]) 01/17/2018    Twan Fu   • Heart burn    • Hypertension 01/17/2018    \"Controlled with medication\"   • Indigestion    • Lupus    • Migraines 01/17/2018   • Seizure (Formerly [facility]) 2013    from high blood pressure, reports 1 time event       FAMILY HISTORY  History reviewed. No pertinent family history.    SOCIAL HISTORY  Social History     Social History   • Marital status: Single     Spouse name: N/A   • Number of children: N/A   • Years of education: N/A     Social History Main Topics   • Smoking status: Never Smoker   • Smokeless tobacco: Never Used   • Alcohol use No   • Drug use: No   • Sexual activity: Not on file     Other Topics Concern   • Not on file     Social History Narrative   • No " "narrative on file       SURGICAL HISTORY  Past Surgical History:   Procedure Laterality Date   • ANGIOPLASTY  01/17/2018    \"Right Arm AV-Fistulagram & Angioplastyx3\"   • ULI BY LAPAROSCOPY  4/5/2010    Performed by SYED MARTELL at SURGERY [facility] ORS   • AV FISTULA CREATION Right    • OTHER      renal biopsy x 3   • OTHER      bone marrow biopsy       CURRENT MEDICATIONS  See nurse's note    ALLERGIES  Allergies   Allergen Reactions   • Clindamycin Rash     Hive   • Keflex Rash     Hives   • Metoprolol Nausea       PHYSICAL EXAM  VITAL SIGNS: /95   Pulse 86   Temp 36.3 °C (97.4 °F) (Temporal)   Resp 14   Ht 1.702 m (5' 7\")   Wt 68.2 kg (150 lb 5.7 oz)   LMP 03/16/2019 (Exact Date)   SpO2 93%   BMI 23.55 kg/m²     Constitutional: Patient is a very pleasant young lady, mild distress from her anxiety.  HENT: Normocephalic, atraumatic. Oropharynx moist without erythema , nose normal with no mucosal edema.   Eyes: PERRL, EOMI, Conjunctiva without erythema or exudates.   Neck: Supple with no cervical adenopathy. Normal range of motion in flexion, extension and lateral rotation. No tenderness along the bony prominences or paraspinal muscles.   Lymphatic: No lymphadenopathy noted.   Cardiovascular: Normal heart rate and rhythm. No murmur, Good heart tones.  Thorax & Lungs: Clear and equal breath sounds with good excursion. No respiratory distress, no rhonchi, wheezing or rales.  Abdomen: Bowel sounds normal in all four quadrants. Soft,nontender, no rebound , guarding.  Skin: Warm, Dry, chronic scattered macular rash on her face.  Back: No cervical, thoracic, or lumbosacral tenderness.  Extremities: Peripheral pulses 4/4 No edema, No tenderness.   Musculoskeletal: Normal range of motion in all major joints.   Neurologic: Alert & oriented x 3, Normal motor function, Normal sensory function,  DTR's 4/4 bilaterally.  Psychiatric: Affect flat, Judgment normal, Mood sad and anxious.    COURSE & MEDICAL " DECISION MAKING  Pertinent Labs & Imaging studies reviewed. (See chart for details)  Patient was given Ativan and Tylenol here in the ER and she is feeling much better.  I have reassured her and told her that she is welcome to come back tonight if she has any worsening problems.  Otherwise she is to follow-up with her nephrologist for dialysis tomorrow and her primary care doctor this week as needed.  She is discharged in stable and improved condition.    FINAL IMPRESSION  1.  Acute anxiety/panic attack  2.  History of lupus  3.  Chronic kidney disease on dialysis  4.  Facial rash         Electronically signed by: Alix Huntley, 4/1/2019 12:00 [facility] Provider Note

## 2019-04-01 NOTE — ED NOTES
Patient provided printed discharge instructions which included signs and symptoms to look out for, why to return to ER, and other follow up appointment to make.  Patient told not to drive, drink alcohol, or preform activities that require alertness after medication given. Patient stated they understand discharge instructions and had no further questions or concerns at this time. Patient discharged to home with mother to drive. Patient ambulated out of ER with stable gait.

## 2019-04-01 NOTE — DISCHARGE INSTRUCTIONS
Follow-up for dialysis tomorrow as scheduled.  Follow-up with Dr. Matthew as needed this week.  Return if any problems or worsening.  Tylenol for your headache.

## 2019-04-01 NOTE — ED TRIAGE NOTES
".  Chief Complaint   Patient presents with   • Anxiety     Pt states that her anxiety started about 1 hour ago.    • Headache     started after pt developed anxiety. Described as bilateral throbbing on her temples that radiates behind her ears.    • Nausea     denies vomiting started with the headache.     ./95   Pulse 86   Temp 36.3 °C (97.4 °F) (Temporal)   Resp 14   Ht 1.702 m (5' 7\")   Wt 68.2 kg (150 lb 5.7 oz)   LMP 03/16/2019 (Exact Date)   SpO2 93%   BMI 23.55 kg/m²     Last dialysis apt was Friday with no complications. Next dialysis is tomorrow at 0500  "

## 2019-06-27 ENCOUNTER — HOSPITAL ENCOUNTER (OUTPATIENT)
Dept: LAB | Facility: MEDICAL CENTER | Age: 24
End: 2019-06-27
Attending: INTERNAL MEDICINE
Payer: COMMERCIAL

## 2019-06-27 LAB
ALBUMIN SERPL BCP-MCNC: 4 G/DL (ref 3.2–4.9)
ALBUMIN/GLOB SERPL: 0.9 G/DL
ALP SERPL-CCNC: 71 U/L (ref 30–99)
ALT SERPL-CCNC: 43 U/L (ref 2–50)
ANION GAP SERPL CALC-SCNC: 10 MMOL/L (ref 0–11.9)
AST SERPL-CCNC: 30 U/L (ref 12–45)
BASOPHILS # BLD AUTO: 0.9 % (ref 0–1.8)
BASOPHILS # BLD: 0.03 K/UL (ref 0–0.12)
BILIRUB SERPL-MCNC: 0.5 MG/DL (ref 0.1–1.5)
BUN SERPL-MCNC: 29 MG/DL (ref 8–22)
C3 SERPL-MCNC: 118 MG/DL (ref 87–200)
C4 SERPL-MCNC: 26 MG/DL (ref 19–52)
CALCIUM SERPL-MCNC: 9.8 MG/DL (ref 8.5–10.5)
CHLORIDE SERPL-SCNC: 90 MMOL/L (ref 96–112)
CO2 SERPL-SCNC: 32 MMOL/L (ref 20–33)
CREAT SERPL-MCNC: 8.09 MG/DL (ref 0.5–1.4)
CRP SERPL HS-MCNC: 0.75 MG/DL (ref 0–0.75)
EOSINOPHIL # BLD AUTO: 0.02 K/UL (ref 0–0.51)
EOSINOPHIL NFR BLD: 0.6 % (ref 0–6.9)
ERYTHROCYTE [DISTWIDTH] IN BLOOD BY AUTOMATED COUNT: 48.9 FL (ref 35.9–50)
ERYTHROCYTE [SEDIMENTATION RATE] IN BLOOD BY WESTERGREN METHOD: 91 MM/HOUR (ref 0–20)
FASTING STATUS PATIENT QL REPORTED: NORMAL
GLOBULIN SER CALC-MCNC: 4.6 G/DL (ref 1.9–3.5)
GLUCOSE SERPL-MCNC: 88 MG/DL (ref 65–99)
HCT VFR BLD AUTO: 33.3 % (ref 37–47)
HGB BLD-MCNC: 10.4 G/DL (ref 12–16)
IMM GRANULOCYTES # BLD AUTO: 0.01 K/UL (ref 0–0.11)
IMM GRANULOCYTES NFR BLD AUTO: 0.3 % (ref 0–0.9)
LYMPHOCYTES # BLD AUTO: 0.63 K/UL (ref 1–4.8)
LYMPHOCYTES NFR BLD: 19.3 % (ref 22–41)
MCH RBC QN AUTO: 28.9 PG (ref 27–33)
MCHC RBC AUTO-ENTMCNC: 31.2 G/DL (ref 33.6–35)
MCV RBC AUTO: 92.5 FL (ref 81.4–97.8)
MONOCYTES # BLD AUTO: 0.28 K/UL (ref 0–0.85)
MONOCYTES NFR BLD AUTO: 8.6 % (ref 0–13.4)
NEUTROPHILS # BLD AUTO: 2.29 K/UL (ref 2–7.15)
NEUTROPHILS NFR BLD: 70.3 % (ref 44–72)
NRBC # BLD AUTO: 0 K/UL
NRBC BLD-RTO: 0 /100 WBC
PLATELET # BLD AUTO: 180 K/UL (ref 164–446)
PMV BLD AUTO: 9.8 FL (ref 9–12.9)
POTASSIUM SERPL-SCNC: 4.5 MMOL/L (ref 3.6–5.5)
PROT SERPL-MCNC: 8.6 G/DL (ref 6–8.2)
RBC # BLD AUTO: 3.6 M/UL (ref 4.2–5.4)
SODIUM SERPL-SCNC: 132 MMOL/L (ref 135–145)
WBC # BLD AUTO: 3.3 K/UL (ref 4.8–10.8)

## 2019-06-27 PROCEDURE — 86140 C-REACTIVE PROTEIN: CPT

## 2019-06-27 PROCEDURE — 86235 NUCLEAR ANTIGEN ANTIBODY: CPT

## 2019-06-27 PROCEDURE — 86225 DNA ANTIBODY NATIVE: CPT

## 2019-06-27 PROCEDURE — 86160 COMPLEMENT ANTIGEN: CPT | Mod: 91

## 2019-06-27 PROCEDURE — 85652 RBC SED RATE AUTOMATED: CPT

## 2019-06-27 PROCEDURE — 80053 COMPREHEN METABOLIC PANEL: CPT

## 2019-06-27 PROCEDURE — 85025 COMPLETE CBC W/AUTO DIFF WBC: CPT

## 2019-06-27 PROCEDURE — 36415 COLL VENOUS BLD VENIPUNCTURE: CPT

## 2019-06-29 LAB
DSDNA AB TITR SER CLIF: NORMAL {TITER}
ENA SM IGG SER-ACNC: 305 AU/ML (ref 0–40)
ENA SS-B IGG SER IA-ACNC: 1 AU/ML (ref 0–40)
SSA52 R0ENA AB IGG Q0420: 42 AU/ML (ref 0–40)
SSA60 R0ENA AB IGG Q0419: 107 AU/ML (ref 0–40)
U1 SNRNP IGG SER QL: 255 AU/ML (ref 0–40)

## 2019-07-11 ENCOUNTER — HOSPITAL ENCOUNTER (OUTPATIENT)
Dept: LAB | Facility: MEDICAL CENTER | Age: 24
End: 2019-07-11
Attending: SURGERY
Payer: COMMERCIAL

## 2019-07-11 LAB
ANION GAP SERPL CALC-SCNC: 12 MMOL/L (ref 0–11.9)
BASOPHILS # BLD AUTO: 1.8 % (ref 0–1.8)
BASOPHILS # BLD: 0.09 K/UL (ref 0–0.12)
BUN SERPL-MCNC: 37 MG/DL (ref 8–22)
CALCIUM SERPL-MCNC: 9.7 MG/DL (ref 8.5–10.5)
CHLORIDE SERPL-SCNC: 93 MMOL/L (ref 96–112)
CO2 SERPL-SCNC: 28 MMOL/L (ref 20–33)
CREAT SERPL-MCNC: 7.46 MG/DL (ref 0.5–1.4)
EOSINOPHIL # BLD AUTO: 0.04 K/UL (ref 0–0.51)
EOSINOPHIL NFR BLD: 0.8 % (ref 0–6.9)
ERYTHROCYTE [DISTWIDTH] IN BLOOD BY AUTOMATED COUNT: 52.5 FL (ref 35.9–50)
GLUCOSE SERPL-MCNC: 79 MG/DL (ref 65–99)
HCT VFR BLD AUTO: 33.9 % (ref 37–47)
HGB BLD-MCNC: 10.5 G/DL (ref 12–16)
IMM GRANULOCYTES # BLD AUTO: 0.02 K/UL (ref 0–0.11)
IMM GRANULOCYTES NFR BLD AUTO: 0.4 % (ref 0–0.9)
LYMPHOCYTES # BLD AUTO: 0.82 K/UL (ref 1–4.8)
LYMPHOCYTES NFR BLD: 16.1 % (ref 22–41)
MCH RBC QN AUTO: 29.3 PG (ref 27–33)
MCHC RBC AUTO-ENTMCNC: 31 G/DL (ref 33.6–35)
MCV RBC AUTO: 94.7 FL (ref 81.4–97.8)
MONOCYTES # BLD AUTO: 0.36 K/UL (ref 0–0.85)
MONOCYTES NFR BLD AUTO: 7.1 % (ref 0–13.4)
NEUTROPHILS # BLD AUTO: 3.76 K/UL (ref 2–7.15)
NEUTROPHILS NFR BLD: 73.8 % (ref 44–72)
NRBC # BLD AUTO: 0 K/UL
NRBC BLD-RTO: 0 /100 WBC
PLATELET # BLD AUTO: 181 K/UL (ref 164–446)
PMV BLD AUTO: 9.7 FL (ref 9–12.9)
POTASSIUM SERPL-SCNC: 4.7 MMOL/L (ref 3.6–5.5)
RBC # BLD AUTO: 3.58 M/UL (ref 4.2–5.4)
SODIUM SERPL-SCNC: 133 MMOL/L (ref 135–145)
WBC # BLD AUTO: 5.1 K/UL (ref 4.8–10.8)

## 2019-07-11 PROCEDURE — 36415 COLL VENOUS BLD VENIPUNCTURE: CPT

## 2019-07-11 PROCEDURE — 85025 COMPLETE CBC W/AUTO DIFF WBC: CPT

## 2019-07-11 PROCEDURE — 80048 BASIC METABOLIC PNL TOTAL CA: CPT

## 2019-08-27 ENCOUNTER — OFFICE VISIT (OUTPATIENT)
Dept: URGENT CARE | Facility: PHYSICIAN GROUP | Age: 24
End: 2019-08-27
Payer: COMMERCIAL

## 2019-08-27 VITALS
OXYGEN SATURATION: 99 % | TEMPERATURE: 98.4 F | DIASTOLIC BLOOD PRESSURE: 90 MMHG | SYSTOLIC BLOOD PRESSURE: 116 MMHG | WEIGHT: 150 LBS | HEART RATE: 82 BPM | BODY MASS INDEX: 23.49 KG/M2 | RESPIRATION RATE: 16 BRPM

## 2019-08-27 DIAGNOSIS — J02.9 SORE THROAT: ICD-10-CM

## 2019-08-27 DIAGNOSIS — J06.9 VIRAL URI: ICD-10-CM

## 2019-08-27 LAB
INT CON NEG: NORMAL
INT CON POS: NORMAL
S PYO AG THROAT QL: NEGATIVE

## 2019-08-27 PROCEDURE — 87880 STREP A ASSAY W/OPTIC: CPT | Performed by: PHYSICIAN ASSISTANT

## 2019-08-27 PROCEDURE — 99214 OFFICE O/P EST MOD 30 MIN: CPT | Performed by: PHYSICIAN ASSISTANT

## 2019-08-27 RX ORDER — TRIAMCINOLONE ACETONIDE 55 UG/1
2 SPRAY, METERED NASAL DAILY
Qty: 1 BOTTLE | Refills: 0 | Status: SHIPPED | OUTPATIENT
Start: 2019-08-27 | End: 2019-10-21

## 2019-08-27 ASSESSMENT — ENCOUNTER SYMPTOMS
COUGH: 0
TROUBLE SWALLOWING: 0
NECK PAIN: 0
VOMITING: 1
HOARSE VOICE: 1
HEADACHES: 0

## 2019-08-27 NOTE — PROGRESS NOTES
Subjective:   Lily Nicole is a 23 y.o. female who presents for Pharyngitis (vomiting, body [id number] )        Patient complains of sudden onset of flulike symptoms x3 days.  Symptoms began with a sore throat that is since improved.  She had one episode of vomiting yesterday.  No recurrence of this. She feels that symptoms are much improved today.  Dialysis 3x/week. PMH significant for SLE.    Pharyngitis    This is a new problem. The current episode started in the past 7 days. The problem has been gradually improving. There has been no fever. The pain is moderate. Associated symptoms include a hoarse voice and vomiting (yesterday). Pertinent negatives include no congestion, coughing, drooling, headaches, plugged ear sensation, neck pain or trouble swallowing. Associated symptoms comments: Body aches.. She has had no exposure to strep or mono. She has tried acetaminophen for the symptoms. The treatment provided no relief.     Review of Systems   HENT: Positive for hoarse voice. Negative for congestion, drooling and trouble swallowing.    Respiratory: Negative for cough.    Gastrointestinal: Positive for vomiting (yesterday).   Musculoskeletal: Negative for neck pain.   Neurological: Negative for headaches.       PMH:  has a past medical history of Anemia (01/17/2018), AVF (arteriovenous fistula) ([facility]), Dialysis patient ([facility]), ESRD (end stage renal disease) on dialysis ([facility]) (01/17/2018), Heart burn, Hypertension (01/17/2018), Indigestion, Lupus ([facility]), Migraines (01/17/2018), and Seizure ([facility]) (2013).  MEDS:   Current Outpatient Medications:   •  triamcinolone (NASACORT) 55 MCG/ACT nasal inhaler, Spray 2 Sprays in nose every day., Disp: 1 Bottle, Rfl: 0  •  omeprazole (PRILOSEC OTC) 20 MG tablet, Take 20 mg by mouth every day., Disp: , Rfl:   •  atenolol (TENORMIN) 25 MG Tab, TAKE ONE TABLET BY MOUTH ONCE DAILY, Disp: 90 Tab, Rfl: 3  •  hydroxychloroquine (PLAQUENIL) 200 MG Tab, TAKE ONE TABLET BY MOUTH AT  "BEDTIME, Disp: 30 Tab, Rfl: 0  •  predniSONE (DELTASONE) 5 MG Tab, Take 5 mg by mouth every morning. LUPUS, Disp: , Rfl:   •  acetaminophen (TYLENOL) 500 MG Tab, Take 500-1,000 mg by mouth as needed., Disp: , Rfl:   •  mycophenolate sodium (MYFORTIC) 360 MG Tablet Delayed Response tablet, Take 360 mg by mouth every evening., Disp: , Rfl:   ALLERGIES:   Allergies   Allergen Reactions   • Clindamycin Rash     Hive   • Keflex Rash     Hives   • Metoprolol Nausea     SURGHX:   Past Surgical History:   Procedure Laterality Date   • ANGIOPLASTY  01/17/2018    \"Right Arm AV-Fistulagram & Angioplastyx3\"   • ULI BY LAPAROSCOPY  4/5/2010    Performed by SYED MARTELL at SURGERY [facility] ORS   • AV FISTULA CREATION Right    • OTHER      renal biopsy x 3   • OTHER      bone marrow biopsy     SOCHX:  reports that she has never smoked. She has never used smokeless tobacco. She reports that she does not drink alcohol or use drugs.  FH: Family history was reviewed, no pertinent findings to report   Objective:   /90   Pulse 82   Temp 36.9 °C (98.4 °F) (Temporal)   Resp 16   Wt 68 kg (150 lb)   SpO2 99%   BMI 23.49 kg/m²   Physical Exam   Constitutional: She is oriented to person, place, and time. She appears well-developed and well-nourished.  Non-toxic appearance. No distress.   HENT:   Head: Normocephalic and atraumatic.   Right Ear: Hearing, external ear and ear canal normal. Tympanic membrane is bulging (mildly).   Left Ear: Hearing, tympanic membrane, external ear and ear canal normal.   Nose: Mucosal edema and rhinorrhea present. Right sinus exhibits no maxillary sinus tenderness. Left sinus exhibits no maxillary sinus tenderness.   Mouth/Throat: Uvula is midline and mucous membranes are normal. Posterior oropharyngeal erythema present.   Eyes: Conjunctivae and lids are normal.   Neck: Neck supple.   Cardiovascular: Normal rate, regular rhythm, S1 normal, S2 normal and normal heart sounds. Exam reveals no " gallop and no friction rub.   No murmur heard.  Pulmonary/Chest: Effort normal and breath sounds normal. No respiratory distress. She has no decreased breath sounds. She has no wheezes. She has no rhonchi. She has no rales.   Abdominal: Normal appearance.   Musculoskeletal:   Fistula right arm.   Neurological: She is alert and oriented to person, place, and time. No cranial nerve deficit or sensory deficit.   Skin: Skin is warm, dry and intact. Capillary refill takes less than 2 seconds.   Psychiatric: She has a normal mood and affect. Her speech is normal and behavior is normal. Judgment and thought content normal. Cognition and memory are normal.   Vitals reviewed.        Assessment/Plan:   1. Viral URI  - triamcinolone (NASACORT) 55 MCG/ACT nasal inhaler; Spray 2 Sprays in nose every day.  Dispense: 1 Bottle; Refill: 0    2. Sore throat  - POCT Rapid Strep A    Rapid strep negative.  Flu is a consideration, but testing not performed as it will not . VSS, no dyspnea, no SOB, and lungs CTA on PE.  Consistent with viral URI without lower respiratory involvement. Goals of care include symptomatic control and prevention of lower respiratory spread. Signs of lower respiratory involvement discussed with pt.  Pt instructed to RTC if any of these are observed.     Drink plenty of fluids and rest.  Flonase 1 squirt in each nostril, as instructed in clinic, once a day.  Use nasal saline TID to promote drainage.   Salt water gurgles to soothe sore throat.  APAP prn body aches.    If you fail to improve in 2-4 days or symptoms worsen/new symptoms develop, RTC for reevaluation.    Differential diagnosis, natural history, supportive care, and indications for immediate follow-up discussed.

## 2019-09-12 ENCOUNTER — APPOINTMENT (RX ONLY)
Dept: URBAN - METROPOLITAN AREA CLINIC 22 | Facility: CLINIC | Age: 24
Setting detail: DERMATOLOGY
End: 2019-09-12

## 2019-09-12 DIAGNOSIS — M32.10 SYSTEMIC LUPUS ERYTHEMATOSUS, ORGAN OR SYSTEM INVOLVEMENT UNSPECIFIED: ICD-10-CM

## 2019-09-12 DIAGNOSIS — L70.0 ACNE VULGARIS: ICD-10-CM

## 2019-09-12 PROCEDURE — ? TREATMENT REGIMEN

## 2019-09-12 PROCEDURE — ? ADDITIONAL NOTES

## 2019-09-12 PROCEDURE — 99213 OFFICE O/P EST LOW 20 MIN: CPT

## 2019-09-12 PROCEDURE — ? COUNSELING

## 2019-09-12 PROCEDURE — ? PRESCRIPTION

## 2019-09-12 RX ORDER — TACROLIMUS 1 MG/G
OINTMENT TOPICAL
Qty: 1 | Refills: 0 | Status: ERX | COMMUNITY
Start: 2019-09-12

## 2019-09-12 RX ORDER — ERYTHROMYCIN AND BENZOYL PEROXIDE 3 %-5 %
KIT TOPICAL
Qty: 1 | Refills: 0 | Status: ERX | COMMUNITY
Start: 2019-09-12

## 2019-09-12 RX ADMIN — TACROLIMUS 1: 1 OINTMENT TOPICAL at 00:00

## 2019-09-12 RX ADMIN — ERYTHROMYCIN AND BENZOYL PEROXIDE 1: KIT at 00:00

## 2019-09-12 ASSESSMENT — LOCATION SIMPLE DESCRIPTION DERM
LOCATION SIMPLE: RIGHT FOREHEAD
LOCATION SIMPLE: LEFT CHEEK

## 2019-09-12 ASSESSMENT — LOCATION DETAILED DESCRIPTION DERM
LOCATION DETAILED: RIGHT MEDIAL FOREHEAD
LOCATION DETAILED: LEFT CENTRAL MALAR CHEEK

## 2019-09-12 ASSESSMENT — LOCATION ZONE DERM: LOCATION ZONE: FACE

## 2019-09-12 NOTE — PROCEDURE: COUNSELING
Bactrim Counseling:  I discussed with the patient the risks of sulfa antibiotics including but not limited to GI upset, allergic reaction, drug rash, diarrhea, dizziness, photosensitivity, and yeast infections.  Rarely, more serious reactions can occur including but not limited to aplastic anemia, agranulocytosis, methemoglobinemia, blood dyscrasias, liver or kidney failure, lung infiltrates or desquamative/blistering drug rashes.
Minocycline Counseling: Patient advised regarding possible photosensitivity and discoloration of the teeth, skin, lips, tongue and gums.  Patient instructed to avoid sunlight, if possible.  When exposed to sunlight, patients should wear protective clothing, sunglasses, and sunscreen.  The patient was instructed to call the office immediately if the following severe adverse effects occur:  hearing changes, easy bruising/bleeding, severe headache, or vision changes.  The patient verbalized understanding of the proper use and possible adverse effects of minocycline.  All of the patient's questions and concerns were addressed.
Tetracycline Pregnancy And Lactation Text: This medication is Pregnancy Category D and not consider safe during pregnancy. It is also excreted in breast milk.
Tazorac Counseling:  Patient advised that medication is irritating and drying.  Patient may need to apply sparingly and wash off after an hour before eventually leaving it on overnight.  The patient verbalized understanding of the proper use and possible adverse effects of tazorac.  All of the patient's questions and concerns were addressed.
Topical Sulfur Applications Pregnancy And Lactation Text: This medication is Pregnancy Category C and has an unknown safety profile during pregnancy. It is unknown if this topical medication is excreted in breast milk.
Dapsone Pregnancy And Lactation Text: This medication is Pregnancy Category C and is not considered safe during pregnancy or breast feeding.
Isotretinoin Counseling: Patient should get monthly blood tests, not donate blood, not drive at night if vision affected, not share medication, and not undergo elective surgery for 6 months after tx completed. Side effects reviewed, pt to contact office should one occur.
Benzoyl Peroxide Counseling: Patient counseled that medicine may cause skin irritation and bleach clothing.  In the event of skin irritation, the patient was advised to reduce the amount of the drug applied or use it less frequently.   The patient verbalized understanding of the proper use and possible adverse effects of benzoyl peroxide.  All of the patient's questions and concerns were addressed.
Tazorac Pregnancy And Lactation Text: This medication is not safe during pregnancy. It is unknown if this medication is excreted in breast milk.
Bactrim Pregnancy And Lactation Text: This medication is Pregnancy Category D and is known to cause fetal risk.  It is also excreted in breast milk.
Doxycycline Counseling:  Patient counseled regarding possible photosensitivity and increased risk for sunburn.  Patient instructed to avoid sunlight, if possible.  When exposed to sunlight, patients should wear protective clothing, sunglasses, and sunscreen.  The patient was instructed to call the office immediately if the following severe adverse effects occur:  hearing changes, easy bruising/bleeding, severe headache, or vision changes.  The patient verbalized understanding of the proper use and possible adverse effects of doxycycline.  All of the patient's questions and concerns were addressed.
Isotretinoin Pregnancy And Lactation Text: This medication is Pregnancy Category X and is considered extremely dangerous during pregnancy. It is unknown if it is excreted in breast milk.
Spironolactone Counseling: Patient advised regarding risks of diarrhea, abdominal pain, hyperkalemia, birth defects (for female patients), liver toxicity and renal toxicity. The patient may need blood work to monitor liver and kidney function and potassium levels while on therapy. The patient verbalized understanding of the proper use and possible adverse effects of spironolactone.  All of the patient's questions and concerns were addressed.
Benzoyl Peroxide Pregnancy And Lactation Text: This medication is Pregnancy Category C. It is unknown if benzoyl peroxide is excreted in breast milk.
Topical Clindamycin Counseling: Patient counseled that this medication may cause skin irritation or allergic reactions.  In the event of skin irritation, the patient was advised to reduce the amount of the drug applied or use it less frequently.   The patient verbalized understanding of the proper use and possible adverse effects of clindamycin.  All of the patient's questions and concerns were addressed.
Birth Control Pills Counseling: Birth Control Pill Counseling: I discussed with the patient the potential side effects of OCPs including but not limited to increased risk of stroke, heart attack, thrombophlebitis, deep venous thrombosis, hepatic adenomas, breast changes, GI upset, headaches, and depression.  The patient verbalized understanding of the proper use and possible adverse effects of OCPs. All of the patient's questions and concerns were addressed.
Include Pregnancy/Lactation Warning?: No
Doxycycline Pregnancy And Lactation Text: This medication is Pregnancy Category D and not consider safe during pregnancy. It is also excreted in breast milk but is considered safe for shorter treatment courses.
High Dose Vitamin A Counseling: Side effects reviewed, pt to contact office should one occur.
Azithromycin Counseling:  I discussed with the patient the risks of azithromycin including but not limited to GI upset, allergic reaction, drug rash, diarrhea, and yeast infections.
Spironolactone Pregnancy And Lactation Text: This medication can cause feminization of the male fetus and should be avoided during pregnancy. The active metabolite is also found in breast milk.
Topical Retinoid counseling:  Patient advised to apply a pea-sized amount only at bedtime and wait 30 minutes after washing their face before applying.  If too drying, patient may add a non-comedogenic moisturizer. The patient verbalized understanding of the proper use and possible adverse effects of retinoids.  All of the patient's questions and concerns were addressed.
Topical Clindamycin Pregnancy And Lactation Text: This medication is Pregnancy Category B and is considered safe during pregnancy. It is unknown if it is excreted in breast milk.
Birth Control Pills Pregnancy And Lactation Text: This medication should be avoided if pregnant and for the first 30 days post-partum.
Erythromycin Counseling:  I discussed with the patient the risks of erythromycin including but not limited to GI upset, allergic reaction, drug rash, diarrhea, increase in liver enzymes, and yeast infections.
High Dose Vitamin A Pregnancy And Lactation Text: High dose vitamin A therapy is contraindicated during pregnancy and breast feeding.
Azithromycin Pregnancy And Lactation Text: This medication is considered safe during pregnancy and is also secreted in breast milk.
Tetracycline Counseling: Patient counseled regarding possible photosensitivity and increased risk for sunburn.  Patient instructed to avoid sunlight, if possible.  When exposed to sunlight, patients should wear protective clothing, sunglasses, and sunscreen.  The patient was instructed to call the office immediately if the following severe adverse effects occur:  hearing changes, easy bruising/bleeding, severe headache, or vision changes.  The patient verbalized understanding of the proper use and possible adverse effects of tetracycline.  All of the patient's questions and concerns were addressed. Patient understands to avoid pregnancy while on therapy due to potential birth defects.
Topical Retinoid Pregnancy And Lactation Text: This medication is Pregnancy Category C. It is unknown if this medication is excreted in breast milk.
Detail Level: Zone
Topical Sulfur Applications Counseling: Topical Sulfur Counseling: Patient counseled that this medication may cause skin irritation or allergic reactions.  In the event of skin irritation, the patient was advised to reduce the amount of the drug applied or use it less frequently.   The patient verbalized understanding of the proper use and possible adverse effects of topical sulfur application.  All of the patient's questions and concerns were addressed.
Dapsone Counseling: I discussed with the patient the risks of dapsone including but not limited to hemolytic anemia, agranulocytosis, rashes, methemoglobinemia, kidney failure, peripheral neuropathy, headaches, GI upset, and liver toxicity.  Patients who start dapsone require monitoring including baseline LFTs and weekly CBCs for the first month, then every month thereafter.  The patient verbalized understanding of the proper use and possible adverse effects of dapsone.  All of the patient's questions and concerns were addressed.
Erythromycin Pregnancy And Lactation Text: This medication is Pregnancy Category B and is considered safe during pregnancy. It is also excreted in breast milk.

## 2019-09-12 NOTE — PROCEDURE: TREATMENT REGIMEN
Action 1: Continue
Hide Aquaphor Products: No
Treatment 1: adapalene 0.1%
Detail Level: Simple
Sig For Treatment 1 (If Needed): every other day

## 2019-09-24 ENCOUNTER — HOSPITAL ENCOUNTER (OUTPATIENT)
Dept: LAB | Facility: MEDICAL CENTER | Age: 24
End: 2019-09-24
Attending: SURGERY
Payer: COMMERCIAL

## 2019-09-24 LAB
ANION GAP SERPL CALC-SCNC: 9 MMOL/L (ref 0–11.9)
BASOPHILS # BLD AUTO: 0.8 % (ref 0–1.8)
BASOPHILS # BLD: 0.03 K/UL (ref 0–0.12)
BUN SERPL-MCNC: 39 MG/DL (ref 8–22)
CALCIUM SERPL-MCNC: 9.8 MG/DL (ref 8.5–10.5)
CHLORIDE SERPL-SCNC: 101 MMOL/L (ref 96–112)
CO2 SERPL-SCNC: 27 MMOL/L (ref 20–33)
CREAT SERPL-MCNC: 6.99 MG/DL (ref 0.5–1.4)
EOSINOPHIL # BLD AUTO: 0.03 K/UL (ref 0–0.51)
EOSINOPHIL NFR BLD: 0.8 % (ref 0–6.9)
ERYTHROCYTE [DISTWIDTH] IN BLOOD BY AUTOMATED COUNT: 60.8 FL (ref 35.9–50)
GLUCOSE SERPL-MCNC: 73 MG/DL (ref 65–99)
HCT VFR BLD AUTO: 30.7 % (ref 37–47)
HGB BLD-MCNC: 9.3 G/DL (ref 12–16)
IMM GRANULOCYTES # BLD AUTO: 0.01 K/UL (ref 0–0.11)
IMM GRANULOCYTES NFR BLD AUTO: 0.3 % (ref 0–0.9)
LYMPHOCYTES # BLD AUTO: 0.79 K/UL (ref 1–4.8)
LYMPHOCYTES NFR BLD: 21.8 % (ref 22–41)
MCH RBC QN AUTO: 30 PG (ref 27–33)
MCHC RBC AUTO-ENTMCNC: 30.3 G/DL (ref 33.6–35)
MCV RBC AUTO: 99 FL (ref 81.4–97.8)
MONOCYTES # BLD AUTO: 0.27 K/UL (ref 0–0.85)
MONOCYTES NFR BLD AUTO: 7.4 % (ref 0–13.4)
NEUTROPHILS # BLD AUTO: 2.5 K/UL (ref 2–7.15)
NEUTROPHILS NFR BLD: 68.9 % (ref 44–72)
NRBC # BLD AUTO: 0 K/UL
NRBC BLD-RTO: 0 /100 WBC
PLATELET # BLD AUTO: 176 K/UL (ref 164–446)
PMV BLD AUTO: 10.1 FL (ref 9–12.9)
POTASSIUM SERPL-SCNC: 5.6 MMOL/L (ref 3.6–5.5)
RBC # BLD AUTO: 3.1 M/UL (ref 4.2–5.4)
SODIUM SERPL-SCNC: 137 MMOL/L (ref 135–145)
WBC # BLD AUTO: 3.6 K/UL (ref 4.8–10.8)

## 2019-09-24 PROCEDURE — 80048 BASIC METABOLIC PNL TOTAL CA: CPT

## 2019-09-24 PROCEDURE — 85025 COMPLETE CBC W/AUTO DIFF WBC: CPT

## 2019-09-24 PROCEDURE — 36415 COLL VENOUS BLD VENIPUNCTURE: CPT

## 2019-10-15 ENCOUNTER — HOSPITAL ENCOUNTER (OUTPATIENT)
Dept: LAB | Facility: MEDICAL CENTER | Age: 24
End: 2019-10-15
Attending: INTERNAL MEDICINE
Payer: COMMERCIAL

## 2019-10-15 PROCEDURE — 86803 HEPATITIS C AB TEST: CPT

## 2019-10-15 PROCEDURE — 87340 HEPATITIS B SURFACE AG IA: CPT

## 2019-10-15 PROCEDURE — 83036 HEMOGLOBIN GLYCOSYLATED A1C: CPT

## 2019-10-15 PROCEDURE — 86635 COCCIDIOIDES ANTIBODY: CPT

## 2019-10-15 PROCEDURE — 86704 HEP B CORE ANTIBODY TOTAL: CPT

## 2019-10-15 PROCEDURE — 86706 HEP B SURFACE ANTIBODY: CPT

## 2019-10-15 PROCEDURE — 87389 HIV-1 AG W/HIV-1&-2 AB AG IA: CPT

## 2019-10-16 LAB
EST. AVERAGE GLUCOSE BLD GHB EST-MCNC: 82 MG/DL
HBA1C MFR BLD: 4.5 % (ref 0–5.6)
HBV CORE AB SERPL QL IA: NEGATIVE
HBV SURFACE AB SERPL IA-ACNC: >1000 MIU/ML (ref 0–10)
HBV SURFACE AG SER QL: NEGATIVE
HCV AB SER QL: NEGATIVE
HIV 1+2 AB+HIV1 P24 AG SERPL QL IA: NON REACTIVE

## 2019-10-21 ENCOUNTER — APPOINTMENT (OUTPATIENT)
Dept: RADIOLOGY | Facility: MEDICAL CENTER | Age: 24
End: 2019-10-21
Attending: EMERGENCY MEDICINE
Payer: COMMERCIAL

## 2019-10-21 ENCOUNTER — HOSPITAL ENCOUNTER (EMERGENCY)
Facility: MEDICAL CENTER | Age: 24
End: 2019-10-21
Attending: EMERGENCY MEDICINE
Payer: COMMERCIAL

## 2019-10-21 VITALS
WEIGHT: 143.08 LBS | HEART RATE: 85 BPM | BODY MASS INDEX: 22.46 KG/M2 | DIASTOLIC BLOOD PRESSURE: 78 MMHG | HEIGHT: 67 IN | SYSTOLIC BLOOD PRESSURE: 109 MMHG | OXYGEN SATURATION: 92 % | TEMPERATURE: 98.2 F | RESPIRATION RATE: 16 BRPM

## 2019-10-21 DIAGNOSIS — R04.2 HEMOPTYSIS: ICD-10-CM

## 2019-10-21 DIAGNOSIS — K29.70 GASTRITIS WITHOUT BLEEDING, UNSPECIFIED CHRONICITY, UNSPECIFIED GASTRITIS TYPE: ICD-10-CM

## 2019-10-21 LAB
ABO GROUP BLD: NORMAL
ALBUMIN SERPL BCP-MCNC: 4.4 G/DL (ref 3.2–4.9)
ALBUMIN/GLOB SERPL: 0.8 G/DL
ALP SERPL-CCNC: 56 U/L (ref 30–99)
ALT SERPL-CCNC: 11 U/L (ref 2–50)
ANION GAP SERPL CALC-SCNC: 15 MMOL/L (ref 0–11.9)
APTT PPP: 30.9 SEC (ref 24.7–36)
AST SERPL-CCNC: 23 U/L (ref 12–45)
BASOPHILS # BLD AUTO: 0.5 % (ref 0–1.8)
BASOPHILS # BLD: 0.03 K/UL (ref 0–0.12)
BILIRUB SERPL-MCNC: 0.5 MG/DL (ref 0.1–1.5)
BLD GP AB SCN SERPL QL: NORMAL
BUN SERPL-MCNC: 24 MG/DL (ref 8–22)
CALCIUM SERPL-MCNC: 9.6 MG/DL (ref 8.4–10.2)
CHLORIDE SERPL-SCNC: 93 MMOL/L (ref 96–112)
CO2 SERPL-SCNC: 28 MMOL/L (ref 20–33)
CREAT SERPL-MCNC: 4.88 MG/DL (ref 0.5–1.4)
EOSINOPHIL # BLD AUTO: 0.07 K/UL (ref 0–0.51)
EOSINOPHIL NFR BLD: 1.2 % (ref 0–6.9)
ERYTHROCYTE [DISTWIDTH] IN BLOOD BY AUTOMATED COUNT: 54 FL (ref 35.9–50)
GLOBULIN SER CALC-MCNC: 5.5 G/DL (ref 1.9–3.5)
GLUCOSE SERPL-MCNC: 96 MG/DL (ref 65–99)
HCT VFR BLD AUTO: 36.4 % (ref 37–47)
HGB BLD-MCNC: 11.3 G/DL (ref 12–16)
IMM GRANULOCYTES # BLD AUTO: 0.04 K/UL (ref 0–0.11)
IMM GRANULOCYTES NFR BLD AUTO: 0.7 % (ref 0–0.9)
INR PPP: 1 (ref 0.87–1.13)
LIPASE SERPL-CCNC: 50 U/L (ref 7–58)
LYMPHOCYTES # BLD AUTO: 0.72 K/UL (ref 1–4.8)
LYMPHOCYTES NFR BLD: 12.8 % (ref 22–41)
MCH RBC QN AUTO: 28.1 PG (ref 27–33)
MCHC RBC AUTO-ENTMCNC: 31 G/DL (ref 33.6–35)
MCV RBC AUTO: 90.5 FL (ref 81.4–97.8)
MONOCYTES # BLD AUTO: 0.4 K/UL (ref 0–0.85)
MONOCYTES NFR BLD AUTO: 7.1 % (ref 0–13.4)
NEUTROPHILS # BLD AUTO: 4.36 K/UL (ref 2–7.15)
NEUTROPHILS NFR BLD: 77.7 % (ref 44–72)
NRBC # BLD AUTO: 0 K/UL
NRBC BLD-RTO: 0 /100 WBC
PLATELET # BLD AUTO: 179 K/UL (ref 164–446)
PMV BLD AUTO: 9.3 FL (ref 9–12.9)
POTASSIUM SERPL-SCNC: 4.1 MMOL/L (ref 3.6–5.5)
PROT SERPL-MCNC: 9.9 G/DL (ref 6–8.2)
PROTHROMBIN TIME: 13.3 SEC (ref 12–14.6)
RBC # BLD AUTO: 4.02 M/UL (ref 4.2–5.4)
RH BLD: NORMAL
SODIUM SERPL-SCNC: 136 MMOL/L (ref 135–145)
WBC # BLD AUTO: 5.6 K/UL (ref 4.8–10.8)

## 2019-10-21 PROCEDURE — 96374 THER/PROPH/DIAG INJ IV PUSH: CPT

## 2019-10-21 PROCEDURE — 86850 RBC ANTIBODY SCREEN: CPT

## 2019-10-21 PROCEDURE — 85610 PROTHROMBIN TIME: CPT

## 2019-10-21 PROCEDURE — C9113 INJ PANTOPRAZOLE SODIUM, VIA: HCPCS | Performed by: EMERGENCY MEDICINE

## 2019-10-21 PROCEDURE — 80053 COMPREHEN METABOLIC PANEL: CPT

## 2019-10-21 PROCEDURE — 86900 BLOOD TYPING SEROLOGIC ABO: CPT

## 2019-10-21 PROCEDURE — 71045 X-RAY EXAM CHEST 1 VIEW: CPT

## 2019-10-21 PROCEDURE — 83690 ASSAY OF LIPASE: CPT

## 2019-10-21 PROCEDURE — 700111 HCHG RX REV CODE 636 W/ 250 OVERRIDE (IP): Performed by: EMERGENCY MEDICINE

## 2019-10-21 PROCEDURE — 85025 COMPLETE CBC W/AUTO DIFF WBC: CPT

## 2019-10-21 PROCEDURE — 86901 BLOOD TYPING SEROLOGIC RH(D): CPT

## 2019-10-21 PROCEDURE — 85730 THROMBOPLASTIN TIME PARTIAL: CPT

## 2019-10-21 PROCEDURE — 99284 EMERGENCY DEPT VISIT MOD MDM: CPT

## 2019-10-21 RX ORDER — LOSARTAN POTASSIUM 25 MG/1
25 TABLET ORAL
Refills: 3 | Status: SHIPPED | COMMUNITY
Start: 2019-08-23 | End: 2019-10-21

## 2019-10-21 RX ORDER — ONDANSETRON 4 MG/1
TABLET, ORALLY DISINTEGRATING ORAL
Status: SHIPPED | COMMUNITY
Start: 2019-06-27 | End: 2019-10-21

## 2019-10-21 RX ORDER — OMEPRAZOLE 20 MG/1
20 TABLET, DELAYED RELEASE ORAL 2 TIMES DAILY
Qty: 30 TAB | Refills: 0 | Status: ON HOLD | OUTPATIENT
Start: 2019-10-21 | End: 2020-05-31

## 2019-10-21 RX ORDER — LOSARTAN POTASSIUM 100 MG/1
100 TABLET ORAL EVERY EVENING
Status: ON HOLD | COMMUNITY
End: 2020-01-28

## 2019-10-21 RX ORDER — PANTOPRAZOLE SODIUM 40 MG/10ML
80 INJECTION, POWDER, LYOPHILIZED, FOR SOLUTION INTRAVENOUS ONCE
Status: COMPLETED | OUTPATIENT
Start: 2019-10-21 | End: 2019-10-21

## 2019-10-21 RX ADMIN — PANTOPRAZOLE SODIUM 80 MG: 40 INJECTION, POWDER, LYOPHILIZED, FOR SOLUTION INTRAVENOUS at 10:41

## 2019-10-21 NOTE — ED PROVIDER NOTES
ED Provider Note    ED Provider Note      Primary care provider: Ella Matthew M.D.    CHIEF COMPLAINT  Chief Complaint   Patient presents with   • Blood in Vomit       HPI  Lily Nicole is a 23 y.o. female who presents to the Emergency Department with chief complaint of hematemesis.  Patient was at dialysis she became very nauseous and vomited reportedly 1 which she describes as large approximately quarter size blood clot.  She had no further hematemesis since that time she had one second episode of emesis which she stated was normal.  She is had loose stools recently but denies any melena any hematochezia.  She had no headache altered mental status cough congestion chest pain or shortness of breath.  She does report that over the last several days she is been having moderate abdominal pain in the epigastrium.  Patient is dialysis dependent secondary to lupus.    REVIEW OF SYSTEMS  10 systems reviewed and otherwise negative, pertinent positives and negatives listed in the history of present illness.    PAST MEDICAL HISTORY   has a past medical history of Anemia (01/17/2018), AVF (arteriovenous fistula) ([facility]), Dialysis patient ([facility]), ESRD (end stage renal disease) on dialysis ([facility]) (01/17/2018), Heart burn, Hypertension (01/17/2018), Indigestion, Lupus ([facility]), Migraines (01/17/2018), and Seizure ([facility]) (2013).    SURGICAL HISTORY   has a past surgical history that includes ronak by laparoscopy (4/5/2010); av fistula creation (Right); angioplasty (01/17/2018); other; and other.    SOCIAL HISTORY  Social History     Tobacco Use   • Smoking status: Never Smoker   • Smokeless tobacco: Never Used   Substance Use Topics   • Alcohol use: No   • Drug use: No      Social History     Substance and Sexual Activity   Drug Use No       FAMILY HISTORY  Non-Contributory    CURRENT MEDICATIONS  Home Medications     Reviewed by Stephanie Zelaya R.N. (Registered Nurse) on 10/21/19 at 0943  Med List Status: Complete  "  Medication Last Dose Status   acetaminophen (TYLENOL) 500 MG Tab 10/14/2019 Active   atenolol (TENORMIN) 25 MG Tab 10/20/2019 Active   hydroxychloroquine (PLAQUENIL) 200 MG Tab 10/20/2019 Active   mycophenolate sodium (MYFORTIC) 360 MG Tablet Delayed Response tablet 10/20/2019 Active   omeprazole (PRILOSEC OTC) 20 MG tablet 10/20/2019 Active   predniSONE (DELTASONE) 5 MG Tab 10/20/2019 Active   triamcinolone (NASACORT) 55 MCG/ACT nasal inhaler 10/20/2019 Active                ALLERGIES  Allergies   Allergen Reactions   • Clindamycin Rash     Hive   • Keflex Rash     Hives   • Metoprolol Nausea       PHYSICAL EXAM  VITAL SIGNS: /78   Pulse 94   Temp 36.8 °C (98.2 °F) (Temporal)   Resp 16   Ht 1.702 m (5' 7\")   Wt 64.9 kg (143 lb 1.3 oz)   LMP 10/07/2019   SpO2 99%   BMI 22.41 kg/m²   Pulse ox interpretation: I interpret this pulse ox as normal.  Constitutional: Alert and oriented x 3, no acute distress  HEENT: Atraumatic normocephalic, pupils are equal round reactive to light extraocular movements are intact. The nares is clear, external ears are normal, mouth shows moist mucous membranes  Neck: Supple, no JVD no tracheal deviation  Cardiovascular: Regular rate and rhythm no murmur rub or gallop 2+ pulses peripherally x4  Thorax & Lungs: No respiratory distress, no wheezes rales or rhonchi, No chest tenderness.   GI: Soft nontender nondistended positive bowel sounds, no peritoneal signs  Skin: Warm dry no acute rash or lesion  Musculoskeletal: Moving all extremities with full range and 5 of 5 strength, no acute  deformity  Neurologic: Cranial nerves III through XII are grossly intact, no sensory deficit, no cerebellar dysfunction   Psychiatric: Appropriate affect for situation at this time      DIAGNOSTIC STUDIES / PROCEDURES  LABS      Results for orders placed or performed during the hospital encounter of 10/21/19   COD (ADULT)   Result Value Ref Range    ABO Grouping Only O     Rh Grouping Only POS "     Antibody Screen-Cod NEG    CBC WITH DIFFERENTIAL   Result Value Ref Range    WBC 5.6 4.8 - 10.8 K/uL    RBC 4.02 (L) 4.20 - 5.40 M/uL    Hemoglobin 11.3 (L) 12.0 - 16.0 g/dL    Hematocrit 36.4 (L) 37.0 - 47.0 %    MCV 90.5 81.4 - 97.8 fL    MCH 28.1 27.0 - 33.0 pg    MCHC 31.0 (L) 33.6 - 35.0 g/dL    RDW 54.0 (H) 35.9 - 50.0 fL    Platelet Count 179 164 - 446 K/uL    MPV 9.3 9.0 - 12.9 fL    Neutrophils-Polys 77.70 (H) 44.00 - 72.00 %    Lymphocytes 12.80 (L) 22.00 - 41.00 %    Monocytes 7.10 0.00 - 13.40 %    Eosinophils 1.20 0.00 - 6.90 %    Basophils 0.50 0.00 - 1.80 %    Immature Granulocytes 0.70 0.00 - 0.90 %    Nucleated RBC 0.00 /100 WBC    Neutrophils (Absolute) 4.36 2.00 - 7.15 K/uL    Lymphs (Absolute) 0.72 (L) 1.00 - 4.80 K/uL    Monos (Absolute) 0.40 0.00 - 0.85 K/uL    Eos (Absolute) 0.07 0.00 - 0.51 K/uL    Baso (Absolute) 0.03 0.00 - 0.12 K/uL    Immature Granulocytes (abs) 0.04 0.00 - 0.11 K/uL    NRBC (Absolute) 0.00 K/uL   COMP METABOLIC PANEL   Result Value Ref Range    Sodium 136 135 - 145 mmol/L    Potassium 4.1 3.6 - 5.5 mmol/L    Chloride 93 (L) 96 - 112 mmol/L    Co2 28 20 - 33 mmol/L    Anion Gap 15.0 (H) 0.0 - 11.9    Glucose 96 65 - 99 mg/dL    Bun 24 (H) 8 - 22 mg/dL    Creatinine 4.88 (H) 0.50 - 1.40 mg/dL    Calcium 9.6 8.4 - 10.2 mg/dL    AST(SGOT) 23 12 - 45 U/L    ALT(SGPT) 11 2 - 50 U/L    Alkaline Phosphatase 56 30 - 99 U/L    Total Bilirubin 0.5 0.1 - 1.5 mg/dL    Albumin 4.4 3.2 - 4.9 g/dL    Total Protein 9.9 (H) 6.0 - 8.2 g/dL    Globulin 5.5 (H) 1.9 - 3.5 g/dL    A-G Ratio 0.8 g/dL   LIPASE   Result Value Ref Range    Lipase 50 7 - 58 U/L   PROTHROMBIN TIME   Result Value Ref Range    PT 13.3 12.0 - 14.6 sec    INR 1.00 0.87 - 1.13   APTT   Result Value Ref Range    APTT 30.9 24.7 - 36.0 sec   ESTIMATED GFR   Result Value Ref Range    GFR If  13 (A) >60 mL/min/1.73 m 2    GFR If Non African American 11 (A) >60 mL/min/1.73 m 2       All labs reviewed by  "me.      RADIOLOGY  DX-CHEST-PORTABLE (1 VIEW)   Final Result      No acute cardiopulmonary abnormality identified.        The radiologist's interpretation of all radiological studies have been reviewed by me.    COURSE & MEDICAL DECISION MAKING  Pertinent Labs & Imaging studies reviewed. (See chart for details)    9:55 AM - Patient seen and examined at bedside.     Coags were ordered in light of possible active bleeding    Patient noted to have slightly elevated blood pressure likely circumstantial secondary to presenting complaint. Referred to primary care physician for further evaluation.      Medical Decision Making: Physical exam reassuring vital signs been stable throughout her entire time here she had no more emesis no hematemesis we have discussed increasing her PPI to twice daily for the next month.  Return immediately should she have any more blood in vomit blood in stool worsening abdominal pain any other acute symptoms or concerns otherwise discharged stable improved condition.    /78   Pulse 94   Temp 36.8 °C (98.2 °F) (Temporal)   Resp 16   Ht 1.702 m (5' 7\")   Wt 64.9 kg (143 lb 1.3 oz)   LMP 10/07/2019   SpO2 99%   BMI 22.41 kg/m²     Ella Matthew M.D.  [address] [facility] [address]  [place] [address]  [phone]    In 2 days      [facility], Emergency Dept  [address]  [place] [id number] [phone]    in 12-24 hours if symptoms persist,, immediately if symptoms worsen      Discharge Medication List as of 10/21/2019 12:36 PM          FINAL IMPRESSION  1. Hemoptysis Active   2. Gastritis without bleeding, unspecified chronicity, unspecified gastritis type Active       This dictation has been created using voice recognition software and/or scribes. The accuracy of the dictation is limited by the abilities of the software and the expertise of the scribes. I expect there may be some errors of grammar and possibly content. I made every attempt to manually " correct the errors within my dictation. However, errors related to voice recognition software and/or scribes may still exist and should be interpreted within the appropriate context.

## 2019-10-21 NOTE — ED TRIAGE NOTES
Pt ambulates to triage  Chief Complaint   Patient presents with   • Blood in Vomit   x 2 while getting dialysis this am  Bed in lowest position. Call bell within reach, will continue to monitor.

## 2019-10-21 NOTE — ED NOTES
Med Rec complete per Pt at bedside  Allergies Reviewed  No ABX in the last 14 days    Pt takes PREDNISONE daily. (no known stop date)

## 2019-10-23 LAB
C IMMITIS IGM SPEC QL IA: 0.1 IV
COCCIDIOIDES AB SPEC QL ID: ABNORMAL
COCCIDIOIDES AB TITR SER CF: ABNORMAL {TITER}
COCCIDIOIDES IGG SPEC QL IA: 0.4 IV

## 2019-11-14 ENCOUNTER — APPOINTMENT (RX ONLY)
Dept: URBAN - METROPOLITAN AREA CLINIC 22 | Facility: CLINIC | Age: 24
Setting detail: DERMATOLOGY
End: 2019-11-14

## 2019-11-14 DIAGNOSIS — M32.10 SYSTEMIC LUPUS ERYTHEMATOSUS, ORGAN OR SYSTEM INVOLVEMENT UNSPECIFIED: ICD-10-CM

## 2019-11-14 DIAGNOSIS — L70.0 ACNE VULGARIS: ICD-10-CM

## 2019-11-14 PROBLEM — L30.9 DERMATITIS, UNSPECIFIED: Status: ACTIVE | Noted: 2019-11-14

## 2019-11-14 PROCEDURE — ? BIOPSY BY SHAVE METHOD

## 2019-11-14 PROCEDURE — 99213 OFFICE O/P EST LOW 20 MIN: CPT | Mod: 25

## 2019-11-14 PROCEDURE — ? ADDITIONAL NOTES

## 2019-11-14 PROCEDURE — ? COUNSELING

## 2019-11-14 PROCEDURE — 11102 TANGNTL BX SKIN SINGLE LES: CPT

## 2019-11-14 ASSESSMENT — LOCATION SIMPLE DESCRIPTION DERM
LOCATION SIMPLE: RIGHT FOREHEAD
LOCATION SIMPLE: LEFT EAR
LOCATION SIMPLE: LEFT CHEEK
LOCATION SIMPLE: RIGHT CHEEK
LOCATION SIMPLE: RIGHT EAR
LOCATION SIMPLE: NECK

## 2019-11-14 ASSESSMENT — LOCATION ZONE DERM
LOCATION ZONE: EAR
LOCATION ZONE: FACE
LOCATION ZONE: NECK

## 2019-11-14 ASSESSMENT — LOCATION DETAILED DESCRIPTION DERM
LOCATION DETAILED: LEFT INFERIOR CENTRAL MALAR CHEEK
LOCATION DETAILED: LEFT CENTRAL MALAR CHEEK
LOCATION DETAILED: RIGHT INFERIOR CRUS OF ANTIHELIX
LOCATION DETAILED: LEFT SUPERIOR LATERAL NECK
LOCATION DETAILED: RIGHT MEDIAL FOREHEAD
LOCATION DETAILED: LEFT INFERIOR CRUS OF ANTIHELIX
LOCATION DETAILED: RIGHT CENTRAL MALAR CHEEK

## 2019-11-14 NOTE — PROCEDURE: BIOPSY BY SHAVE METHOD
Render Post-Care Instructions In Note?: yes
Dressing: pressure dressing with telfa
Hemostasis: Drysol
Silver Nitrate Text: The wound bed was treated with silver nitrate after the biopsy was performed.
Bill [id number] For Specimen Handling/Conveyance To Laboratory?: no
Biopsy Method: Personna blade
Detail Level: Detailed
Curettage Text: The wound bed was treated with curettage after the biopsy was performed.
Lab: [id number]
Billing Type: Third-Party Bill
Size Of Lesion In Cm: 0
Post-Care Instructions: I reviewed with the patient in detail post-care instructions. Patient is to keep the biopsy site dry overnight. Gentle cleansing daily.  Apply petroleum ointment daily until healed. Patient may apply hydrogen peroxide soaks to remove any crusting.
Cryotherapy Text: The wound bed was treated with cryotherapy after the biopsy was performed.
Anesthesia Type: 1% lidocaine with 1:100,000 epinephrine and a 1:10 solution of 8.4% sodium bicarbonate
Wound Care: Petrolatum
Lab Facility: 
Depth Of Biopsy: dermis
Biopsy Type: H and E
Electrodesiccation Text: The wound bed was treated with electrodesiccation after the biopsy was performed.
Notification Instructions: Patient will be notified of biopsy results. However, patient instructed to call the office if not contacted within 2 weeks.
Consent: Written consent was obtained and risks were reviewed including but not limited to scarring, infection, bleeding, scabbing, incomplete removal, nerve damage and allergy to anesthesia.
Electrodesiccation And Curettage Text: The wound bed was treated with electrodesiccation and curettage after the biopsy was performed.
Type Of Destruction Used: Curettage
Anesthesia Volume In Cc: 1

## 2019-11-14 NOTE — PROCEDURE: COUNSELING
Topical Retinoid Pregnancy And Lactation Text: This medication is Pregnancy Category C. It is unknown if this medication is excreted in breast milk.
Isotretinoin Pregnancy And Lactation Text: This medication is Pregnancy Category X and is considered extremely dangerous during pregnancy. It is unknown if it is excreted in breast milk.
Topical Clindamycin Pregnancy And Lactation Text: This medication is Pregnancy Category B and is considered safe during pregnancy. It is unknown if it is excreted in breast milk.
Minocycline Pregnancy And Lactation Text: This medication is Pregnancy Category D and not consider safe during pregnancy. It is also excreted in breast milk.
High Dose Vitamin A Pregnancy And Lactation Text: High dose vitamin A therapy is contraindicated during pregnancy and breast feeding.
Erythromycin Pregnancy And Lactation Text: This medication is Pregnancy Category B and is considered safe during pregnancy. It is also excreted in breast milk.
High Dose Vitamin A Counseling: Side effects reviewed, pt to contact office should one occur.
Bactrim Counseling:  I discussed with the patient the risks of sulfa antibiotics including but not limited to GI upset, allergic reaction, drug rash, diarrhea, dizziness, photosensitivity, and yeast infections.  Rarely, more serious reactions can occur including but not limited to aplastic anemia, agranulocytosis, methemoglobinemia, blood dyscrasias, liver or kidney failure, lung infiltrates or desquamative/blistering drug rashes.
Benzoyl Peroxide Pregnancy And Lactation Text: This medication is Pregnancy Category C. It is unknown if benzoyl peroxide is excreted in breast milk.
Tazorac Counseling:  Patient advised that medication is irritating and drying.  Patient may need to apply sparingly and wash off after an hour before eventually leaving it on overnight.  The patient verbalized understanding of the proper use and possible adverse effects of tazorac.  All of the patient's questions and concerns were addressed.
Include Pregnancy/Lactation Warning?: No
Birth Control Pills Pregnancy And Lactation Text: This medication should be avoided if pregnant and for the first 30 days post-partum.
Benzoyl Peroxide Counseling: Patient counseled that medicine may cause skin irritation and bleach clothing.  In the event of skin irritation, the patient was advised to reduce the amount of the drug applied or use it less frequently.   The patient verbalized understanding of the proper use and possible adverse effects of benzoyl peroxide.  All of the patient's questions and concerns were addressed.
Spironolactone Pregnancy And Lactation Text: This medication can cause feminization of the male fetus and should be avoided during pregnancy. The active metabolite is also found in breast milk.
Bactrim Pregnancy And Lactation Text: This medication is Pregnancy Category D and is known to cause fetal risk.  It is also excreted in breast milk.
Azithromycin Pregnancy And Lactation Text: This medication is considered safe during pregnancy and is also secreted in breast milk.
Detail Level: Zone
Dapsone Counseling: I discussed with the patient the risks of dapsone including but not limited to hemolytic anemia, agranulocytosis, rashes, methemoglobinemia, kidney failure, peripheral neuropathy, headaches, GI upset, and liver toxicity.  Patients who start dapsone require monitoring including baseline LFTs and weekly CBCs for the first month, then every month thereafter.  The patient verbalized understanding of the proper use and possible adverse effects of dapsone.  All of the patient's questions and concerns were addressed.
Azithromycin Counseling:  I discussed with the patient the risks of azithromycin including but not limited to GI upset, allergic reaction, drug rash, diarrhea, and yeast infections.
Isotretinoin Counseling: Patient should get monthly blood tests, not donate blood, not drive at night if vision affected, not share medication, and not undergo elective surgery for 6 months after tx completed. Side effects reviewed, pt to contact office should one occur.
Minocycline Counseling: Patient advised regarding possible photosensitivity and discoloration of the teeth, skin, lips, tongue and gums.  Patient instructed to avoid sunlight, if possible.  When exposed to sunlight, patients should wear protective clothing, sunglasses, and sunscreen.  The patient was instructed to call the office immediately if the following severe adverse effects occur:  hearing changes, easy bruising/bleeding, severe headache, or vision changes.  The patient verbalized understanding of the proper use and possible adverse effects of minocycline.  All of the patient's questions and concerns were addressed.
Topical Clindamycin Counseling: Patient counseled that this medication may cause skin irritation or allergic reactions.  In the event of skin irritation, the patient was advised to reduce the amount of the drug applied or use it less frequently.   The patient verbalized understanding of the proper use and possible adverse effects of clindamycin.  All of the patient's questions and concerns were addressed.
Spironolactone Counseling: Patient advised regarding risks of diarrhea, abdominal pain, hyperkalemia, birth defects (for female patients), liver toxicity and renal toxicity. The patient may need blood work to monitor liver and kidney function and potassium levels while on therapy. The patient verbalized understanding of the proper use and possible adverse effects of spironolactone.  All of the patient's questions and concerns were addressed.
Topical Sulfur Applications Pregnancy And Lactation Text: This medication is Pregnancy Category C and has an unknown safety profile during pregnancy. It is unknown if this topical medication is excreted in breast milk.
Tetracycline Counseling: Patient counseled regarding possible photosensitivity and increased risk for sunburn.  Patient instructed to avoid sunlight, if possible.  When exposed to sunlight, patients should wear protective clothing, sunglasses, and sunscreen.  The patient was instructed to call the office immediately if the following severe adverse effects occur:  hearing changes, easy bruising/bleeding, severe headache, or vision changes.  The patient verbalized understanding of the proper use and possible adverse effects of tetracycline.  All of the patient's questions and concerns were addressed. Patient understands to avoid pregnancy while on therapy due to potential birth defects.
Doxycycline Counseling:  Patient counseled regarding possible photosensitivity and increased risk for sunburn.  Patient instructed to avoid sunlight, if possible.  When exposed to sunlight, patients should wear protective clothing, sunglasses, and sunscreen.  The patient was instructed to call the office immediately if the following severe adverse effects occur:  hearing changes, easy bruising/bleeding, severe headache, or vision changes.  The patient verbalized understanding of the proper use and possible adverse effects of doxycycline.  All of the patient's questions and concerns were addressed.
Topical Retinoid counseling:  Patient advised to apply a pea-sized amount only at bedtime and wait 30 minutes after washing their face before applying.  If too drying, patient may add a non-comedogenic moisturizer. The patient verbalized understanding of the proper use and possible adverse effects of retinoids.  All of the patient's questions and concerns were addressed.
Tazorac Pregnancy And Lactation Text: This medication is not safe during pregnancy. It is unknown if this medication is excreted in breast milk.
Erythromycin Counseling:  I discussed with the patient the risks of erythromycin including but not limited to GI upset, allergic reaction, drug rash, diarrhea, increase in liver enzymes, and yeast infections.
Topical Sulfur Applications Counseling: Topical Sulfur Counseling: Patient counseled that this medication may cause skin irritation or allergic reactions.  In the event of skin irritation, the patient was advised to reduce the amount of the drug applied or use it less frequently.   The patient verbalized understanding of the proper use and possible adverse effects of topical sulfur application.  All of the patient's questions and concerns were addressed.
Doxycycline Pregnancy And Lactation Text: This medication is Pregnancy Category D and not consider safe during pregnancy. It is also excreted in breast milk but is considered safe for shorter treatment courses.
Dapsone Pregnancy And Lactation Text: This medication is Pregnancy Category C and is not considered safe during pregnancy or breast feeding.
Birth Control Pills Counseling: Birth Control Pill Counseling: I discussed with the patient the potential side effects of OCPs including but not limited to increased risk of stroke, heart attack, thrombophlebitis, deep venous thrombosis, hepatic adenomas, breast changes, GI upset, headaches, and depression.  The patient verbalized understanding of the proper use and possible adverse effects of OCPs. All of the patient's questions and concerns were addressed.

## 2019-11-14 NOTE — PROCEDURE: ADDITIONAL NOTES
Additional Notes: Continue using Protopic
Detail Level: Detailed
Additional Notes: Clinical findings today more characteristic of discoid

## 2019-12-09 ENCOUNTER — ANESTHESIA (OUTPATIENT)
Dept: SURGERY | Facility: MEDICAL CENTER | Age: 24
DRG: 377 | End: 2019-12-09
Payer: COMMERCIAL

## 2019-12-09 ENCOUNTER — APPOINTMENT (OUTPATIENT)
Dept: RADIOLOGY | Facility: MEDICAL CENTER | Age: 24
DRG: 377 | End: 2019-12-09
Attending: EMERGENCY MEDICINE
Payer: COMMERCIAL

## 2019-12-09 ENCOUNTER — ANESTHESIA EVENT (OUTPATIENT)
Dept: SURGERY | Facility: MEDICAL CENTER | Age: 24
DRG: 377 | End: 2019-12-09
Payer: COMMERCIAL

## 2019-12-09 ENCOUNTER — HOSPITAL ENCOUNTER (INPATIENT)
Facility: MEDICAL CENTER | Age: 24
LOS: 1 days | DRG: 377 | End: 2019-12-10
Attending: EMERGENCY MEDICINE | Admitting: HOSPITALIST
Payer: COMMERCIAL

## 2019-12-09 DIAGNOSIS — K92.0 HEMATEMESIS, PRESENCE OF NAUSEA NOT SPECIFIED: ICD-10-CM

## 2019-12-09 PROBLEM — M32.9 LUPUS (HCC): Status: ACTIVE | Noted: 2019-12-09

## 2019-12-09 PROBLEM — I10 HYPERTENSION: Status: ACTIVE | Noted: 2018-01-17

## 2019-12-09 LAB
ABO GROUP BLD: NORMAL
ALBUMIN SERPL BCP-MCNC: 4 G/DL (ref 3.2–4.9)
ALBUMIN/GLOB SERPL: 0.8 G/DL
ALP SERPL-CCNC: 51 U/L (ref 30–99)
ALT SERPL-CCNC: 13 U/L (ref 2–50)
ANION GAP SERPL CALC-SCNC: 13 MMOL/L (ref 0–11.9)
AST SERPL-CCNC: 23 U/L (ref 12–45)
BASOPHILS # BLD AUTO: 0.6 % (ref 0–1.8)
BASOPHILS # BLD: 0.03 K/UL (ref 0–0.12)
BILIRUB SERPL-MCNC: 0.4 MG/DL (ref 0.1–1.5)
BLD GP AB SCN SERPL QL: NORMAL
BUN SERPL-MCNC: 21 MG/DL (ref 8–22)
CALCIUM SERPL-MCNC: 9.4 MG/DL (ref 8.4–10.2)
CHLORIDE SERPL-SCNC: 92 MMOL/L (ref 96–112)
CO2 SERPL-SCNC: 31 MMOL/L (ref 20–33)
CREAT SERPL-MCNC: 5.19 MG/DL (ref 0.5–1.4)
EOSINOPHIL # BLD AUTO: 0.02 K/UL (ref 0–0.51)
EOSINOPHIL NFR BLD: 0.4 % (ref 0–6.9)
ERYTHROCYTE [DISTWIDTH] IN BLOOD BY AUTOMATED COUNT: 54.7 FL (ref 35.9–50)
GLOBULIN SER CALC-MCNC: 5.2 G/DL (ref 1.9–3.5)
GLUCOSE SERPL-MCNC: 94 MG/DL (ref 65–99)
HCG SERPL QL: NEGATIVE
HCT VFR BLD AUTO: 32.6 % (ref 37–47)
HGB BLD-MCNC: 10 G/DL (ref 12–16)
HGB BLD-MCNC: 9.2 G/DL (ref 12–16)
IMM GRANULOCYTES # BLD AUTO: 0.02 K/UL (ref 0–0.11)
IMM GRANULOCYTES NFR BLD AUTO: 0.4 % (ref 0–0.9)
LIPASE SERPL-CCNC: 31 U/L (ref 7–58)
LYMPHOCYTES # BLD AUTO: 0.54 K/UL (ref 1–4.8)
LYMPHOCYTES NFR BLD: 10.4 % (ref 22–41)
MCH RBC QN AUTO: 28.2 PG (ref 27–33)
MCHC RBC AUTO-ENTMCNC: 30.7 G/DL (ref 33.6–35)
MCV RBC AUTO: 91.8 FL (ref 81.4–97.8)
MONOCYTES # BLD AUTO: 0.37 K/UL (ref 0–0.85)
MONOCYTES NFR BLD AUTO: 7.1 % (ref 0–13.4)
NEUTROPHILS # BLD AUTO: 4.2 K/UL (ref 2–7.15)
NEUTROPHILS NFR BLD: 81.1 % (ref 44–72)
NRBC # BLD AUTO: 0 K/UL
NRBC BLD-RTO: 0 /100 WBC
PATHOLOGY CONSULT NOTE: NORMAL
PLATELET # BLD AUTO: 151 K/UL (ref 164–446)
PMV BLD AUTO: 9.6 FL (ref 9–12.9)
POTASSIUM SERPL-SCNC: 3.8 MMOL/L (ref 3.6–5.5)
PROT SERPL-MCNC: 9.2 G/DL (ref 6–8.2)
RBC # BLD AUTO: 3.55 M/UL (ref 4.2–5.4)
RH BLD: NORMAL
SODIUM SERPL-SCNC: 136 MMOL/L (ref 135–145)
WBC # BLD AUTO: 5.2 K/UL (ref 4.8–10.8)

## 2019-12-09 PROCEDURE — 501629 HCHG TUBE, LUKI TRAP STERILE DISP: Performed by: INTERNAL MEDICINE

## 2019-12-09 PROCEDURE — 84703 CHORIONIC GONADOTROPIN ASSAY: CPT

## 2019-12-09 PROCEDURE — 700101 HCHG RX REV CODE 250: Performed by: ANESTHESIOLOGY

## 2019-12-09 PROCEDURE — 700111 HCHG RX REV CODE 636 W/ 250 OVERRIDE (IP): Performed by: EMERGENCY MEDICINE

## 2019-12-09 PROCEDURE — 99285 EMERGENCY DEPT VISIT HI MDM: CPT

## 2019-12-09 PROCEDURE — 86901 BLOOD TYPING SEROLOGIC RH(D): CPT

## 2019-12-09 PROCEDURE — 700105 HCHG RX REV CODE 258: Performed by: HOSPITALIST

## 2019-12-09 PROCEDURE — 160002 HCHG RECOVERY MINUTES (STAT): Performed by: INTERNAL MEDICINE

## 2019-12-09 PROCEDURE — C9113 INJ PANTOPRAZOLE SODIUM, VIA: HCPCS | Performed by: EMERGENCY MEDICINE

## 2019-12-09 PROCEDURE — 700111 HCHG RX REV CODE 636 W/ 250 OVERRIDE (IP): Performed by: ANESTHESIOLOGY

## 2019-12-09 PROCEDURE — 160208 HCHG ENDO MINUTES - EA ADDL 1 MIN LEVEL 4: Performed by: INTERNAL MEDICINE

## 2019-12-09 PROCEDURE — 160036 HCHG PACU - EA ADDL 30 MINS PHASE I: Performed by: INTERNAL MEDICINE

## 2019-12-09 PROCEDURE — A9270 NON-COVERED ITEM OR SERVICE: HCPCS | Performed by: INTERNAL MEDICINE

## 2019-12-09 PROCEDURE — 160203 HCHG ENDO MINUTES - 1ST 30 MINS LEVEL 4: Performed by: INTERNAL MEDICINE

## 2019-12-09 PROCEDURE — 0W3P8ZZ CONTROL BLEEDING IN GASTROINTESTINAL TRACT, VIA NATURAL OR ARTIFICIAL OPENING ENDOSCOPIC: ICD-10-PCS | Performed by: INTERNAL MEDICINE

## 2019-12-09 PROCEDURE — C9113 INJ PANTOPRAZOLE SODIUM, VIA: HCPCS

## 2019-12-09 PROCEDURE — 700102 HCHG RX REV CODE 250 W/ 637 OVERRIDE(OP): Performed by: INTERNAL MEDICINE

## 2019-12-09 PROCEDURE — 71045 X-RAY EXAM CHEST 1 VIEW: CPT

## 2019-12-09 PROCEDURE — 85018 HEMOGLOBIN: CPT

## 2019-12-09 PROCEDURE — A9270 NON-COVERED ITEM OR SERVICE: HCPCS | Performed by: HOSPITALIST

## 2019-12-09 PROCEDURE — 85025 COMPLETE CBC W/AUTO DIFF WBC: CPT

## 2019-12-09 PROCEDURE — 700105 HCHG RX REV CODE 258: Performed by: INTERNAL MEDICINE

## 2019-12-09 PROCEDURE — 88312 SPECIAL STAINS GROUP 1: CPT

## 2019-12-09 PROCEDURE — 0DB68ZX EXCISION OF STOMACH, VIA NATURAL OR ARTIFICIAL OPENING ENDOSCOPIC, DIAGNOSTIC: ICD-10-PCS | Performed by: INTERNAL MEDICINE

## 2019-12-09 PROCEDURE — 88305 TISSUE EXAM BY PATHOLOGIST: CPT

## 2019-12-09 PROCEDURE — 502240 HCHG MISC OR SUPPLY RC 0272: Performed by: INTERNAL MEDICINE

## 2019-12-09 PROCEDURE — 770020 HCHG ROOM/CARE - TELE (206)

## 2019-12-09 PROCEDURE — 93005 ELECTROCARDIOGRAM TRACING: CPT | Performed by: EMERGENCY MEDICINE

## 2019-12-09 PROCEDURE — C9113 INJ PANTOPRAZOLE SODIUM, VIA: HCPCS | Performed by: HOSPITALIST

## 2019-12-09 PROCEDURE — 700105 HCHG RX REV CODE 258: Performed by: ANESTHESIOLOGY

## 2019-12-09 PROCEDURE — 86850 RBC ANTIBODY SCREEN: CPT

## 2019-12-09 PROCEDURE — 83690 ASSAY OF LIPASE: CPT

## 2019-12-09 PROCEDURE — 80053 COMPREHEN METABOLIC PANEL: CPT

## 2019-12-09 PROCEDURE — 700111 HCHG RX REV CODE 636 W/ 250 OVERRIDE (IP)

## 2019-12-09 PROCEDURE — 160035 HCHG PACU - 1ST 60 MINS PHASE I: Performed by: INTERNAL MEDICINE

## 2019-12-09 PROCEDURE — 160048 HCHG OR STATISTICAL LEVEL 1-5: Performed by: INTERNAL MEDICINE

## 2019-12-09 PROCEDURE — 96374 THER/PROPH/DIAG INJ IV PUSH: CPT

## 2019-12-09 PROCEDURE — 99223 1ST HOSP IP/OBS HIGH 75: CPT | Performed by: HOSPITALIST

## 2019-12-09 PROCEDURE — 700102 HCHG RX REV CODE 250 W/ 637 OVERRIDE(OP): Performed by: HOSPITALIST

## 2019-12-09 PROCEDURE — 36415 COLL VENOUS BLD VENIPUNCTURE: CPT

## 2019-12-09 PROCEDURE — 700111 HCHG RX REV CODE 636 W/ 250 OVERRIDE (IP): Performed by: HOSPITALIST

## 2019-12-09 PROCEDURE — 160009 HCHG ANES TIME/MIN: Performed by: INTERNAL MEDICINE

## 2019-12-09 PROCEDURE — 500066 HCHG BITE BLOCK, ECT: Performed by: INTERNAL MEDICINE

## 2019-12-09 PROCEDURE — 86900 BLOOD TYPING SEROLOGIC ABO: CPT

## 2019-12-09 RX ORDER — HYDROMORPHONE HYDROCHLORIDE 1 MG/ML
0.4 INJECTION, SOLUTION INTRAMUSCULAR; INTRAVENOUS; SUBCUTANEOUS
Status: DISCONTINUED | OUTPATIENT
Start: 2019-12-09 | End: 2019-12-09 | Stop reason: HOSPADM

## 2019-12-09 RX ORDER — MIDAZOLAM HYDROCHLORIDE 1 MG/ML
1 INJECTION INTRAMUSCULAR; INTRAVENOUS
Status: DISCONTINUED | OUTPATIENT
Start: 2019-12-09 | End: 2019-12-09 | Stop reason: HOSPADM

## 2019-12-09 RX ORDER — ATENOLOL 25 MG/1
25 TABLET ORAL NIGHTLY
Status: DISCONTINUED | OUTPATIENT
Start: 2019-12-09 | End: 2019-12-10 | Stop reason: HOSPADM

## 2019-12-09 RX ORDER — OXYCODONE HYDROCHLORIDE AND ACETAMINOPHEN 5; 325 MG/1; MG/1
2 TABLET ORAL
Status: DISCONTINUED | OUTPATIENT
Start: 2019-12-09 | End: 2019-12-09 | Stop reason: HOSPADM

## 2019-12-09 RX ORDER — DIPHENHYDRAMINE HYDROCHLORIDE 50 MG/ML
12.5 INJECTION INTRAMUSCULAR; INTRAVENOUS
Status: DISCONTINUED | OUTPATIENT
Start: 2019-12-09 | End: 2019-12-09 | Stop reason: HOSPADM

## 2019-12-09 RX ORDER — HYDROMORPHONE HYDROCHLORIDE 1 MG/ML
0.1 INJECTION, SOLUTION INTRAMUSCULAR; INTRAVENOUS; SUBCUTANEOUS
Status: DISCONTINUED | OUTPATIENT
Start: 2019-12-09 | End: 2019-12-09 | Stop reason: HOSPADM

## 2019-12-09 RX ORDER — HYDROMORPHONE HYDROCHLORIDE 1 MG/ML
0.2 INJECTION, SOLUTION INTRAMUSCULAR; INTRAVENOUS; SUBCUTANEOUS
Status: DISCONTINUED | OUTPATIENT
Start: 2019-12-09 | End: 2019-12-09 | Stop reason: HOSPADM

## 2019-12-09 RX ORDER — ONDANSETRON 2 MG/ML
4 INJECTION INTRAMUSCULAR; INTRAVENOUS
Status: COMPLETED | OUTPATIENT
Start: 2019-12-09 | End: 2019-12-09

## 2019-12-09 RX ORDER — SUCRALFATE ORAL 1 G/10ML
1 SUSPENSION ORAL EVERY 6 HOURS
Status: DISCONTINUED | OUTPATIENT
Start: 2019-12-09 | End: 2019-12-10 | Stop reason: HOSPADM

## 2019-12-09 RX ORDER — PROCHLORPERAZINE EDISYLATE 5 MG/ML
5-10 INJECTION INTRAMUSCULAR; INTRAVENOUS EVERY 4 HOURS PRN
Status: DISCONTINUED | OUTPATIENT
Start: 2019-12-09 | End: 2019-12-10 | Stop reason: HOSPADM

## 2019-12-09 RX ORDER — PANTOPRAZOLE SODIUM 40 MG/10ML
40 INJECTION, POWDER, LYOPHILIZED, FOR SOLUTION INTRAVENOUS ONCE
Status: COMPLETED | OUTPATIENT
Start: 2019-12-09 | End: 2019-12-09

## 2019-12-09 RX ORDER — PREDNISONE 5 MG/1
5 TABLET ORAL EVERY EVENING
Status: DISCONTINUED | OUTPATIENT
Start: 2019-12-09 | End: 2019-12-10 | Stop reason: HOSPADM

## 2019-12-09 RX ORDER — METOPROLOL TARTRATE 1 MG/ML
1 INJECTION, SOLUTION INTRAVENOUS
Status: DISCONTINUED | OUTPATIENT
Start: 2019-12-09 | End: 2019-12-09 | Stop reason: HOSPADM

## 2019-12-09 RX ORDER — SODIUM CHLORIDE 9 MG/ML
INJECTION, SOLUTION INTRAVENOUS ONCE
Status: COMPLETED | OUTPATIENT
Start: 2019-12-09 | End: 2019-12-09

## 2019-12-09 RX ORDER — LOSARTAN POTASSIUM 25 MG/1
100 TABLET ORAL NIGHTLY
Status: DISCONTINUED | OUTPATIENT
Start: 2019-12-09 | End: 2019-12-10 | Stop reason: HOSPADM

## 2019-12-09 RX ORDER — HYDROXYCHLOROQUINE SULFATE 200 MG/1
200 TABLET, FILM COATED ORAL EVERY EVENING
Status: DISCONTINUED | OUTPATIENT
Start: 2019-12-09 | End: 2019-12-10 | Stop reason: HOSPADM

## 2019-12-09 RX ORDER — ONDANSETRON 2 MG/ML
INJECTION INTRAMUSCULAR; INTRAVENOUS PRN
Status: DISCONTINUED | OUTPATIENT
Start: 2019-12-09 | End: 2019-12-09 | Stop reason: SURG

## 2019-12-09 RX ORDER — SODIUM CHLORIDE 9 MG/ML
INJECTION, SOLUTION INTRAVENOUS
Status: DISCONTINUED | OUTPATIENT
Start: 2019-12-09 | End: 2019-12-09 | Stop reason: SURG

## 2019-12-09 RX ORDER — ACETAMINOPHEN 325 MG/1
650 TABLET ORAL EVERY 6 HOURS PRN
Status: DISCONTINUED | OUTPATIENT
Start: 2019-12-09 | End: 2019-12-10 | Stop reason: HOSPADM

## 2019-12-09 RX ORDER — ONDANSETRON 4 MG/1
4 TABLET, ORALLY DISINTEGRATING ORAL EVERY 4 HOURS PRN
Status: DISCONTINUED | OUTPATIENT
Start: 2019-12-09 | End: 2019-12-10 | Stop reason: HOSPADM

## 2019-12-09 RX ORDER — MYCOPHENOLIC ACID 360 MG/1
360 TABLET, DELAYED RELEASE ORAL EVERY EVENING
Status: DISCONTINUED | OUTPATIENT
Start: 2019-12-09 | End: 2019-12-09

## 2019-12-09 RX ORDER — PANTOPRAZOLE SODIUM 40 MG/10ML
INJECTION, POWDER, LYOPHILIZED, FOR SOLUTION INTRAVENOUS
Status: ACTIVE
Start: 2019-12-09 | End: 2019-12-10

## 2019-12-09 RX ORDER — OXYCODONE HYDROCHLORIDE AND ACETAMINOPHEN 5; 325 MG/1; MG/1
1 TABLET ORAL
Status: DISCONTINUED | OUTPATIENT
Start: 2019-12-09 | End: 2019-12-09 | Stop reason: HOSPADM

## 2019-12-09 RX ORDER — PROMETHAZINE HYDROCHLORIDE 25 MG/1
12.5-25 SUPPOSITORY RECTAL EVERY 4 HOURS PRN
Status: DISCONTINUED | OUTPATIENT
Start: 2019-12-09 | End: 2019-12-10 | Stop reason: HOSPADM

## 2019-12-09 RX ORDER — ONDANSETRON 2 MG/ML
4 INJECTION INTRAMUSCULAR; INTRAVENOUS EVERY 4 HOURS PRN
Status: DISCONTINUED | OUTPATIENT
Start: 2019-12-09 | End: 2019-12-10 | Stop reason: HOSPADM

## 2019-12-09 RX ORDER — OMEPRAZOLE 20 MG/1
20 CAPSULE, DELAYED RELEASE ORAL 2 TIMES DAILY
Status: DISCONTINUED | OUTPATIENT
Start: 2019-12-09 | End: 2019-12-09

## 2019-12-09 RX ORDER — MEPERIDINE HYDROCHLORIDE 25 MG/ML
12.5 INJECTION INTRAMUSCULAR; INTRAVENOUS; SUBCUTANEOUS
Status: DISCONTINUED | OUTPATIENT
Start: 2019-12-09 | End: 2019-12-09 | Stop reason: HOSPADM

## 2019-12-09 RX ORDER — HYDRALAZINE HYDROCHLORIDE 20 MG/ML
5 INJECTION INTRAMUSCULAR; INTRAVENOUS
Status: DISCONTINUED | OUTPATIENT
Start: 2019-12-09 | End: 2019-12-09 | Stop reason: HOSPADM

## 2019-12-09 RX ORDER — SUCCINYLCHOLINE CHLORIDE 20 MG/ML
INJECTION INTRAMUSCULAR; INTRAVENOUS PRN
Status: DISCONTINUED | OUTPATIENT
Start: 2019-12-09 | End: 2019-12-09 | Stop reason: SURG

## 2019-12-09 RX ORDER — HALOPERIDOL 5 MG/ML
1 INJECTION INTRAMUSCULAR
Status: DISCONTINUED | OUTPATIENT
Start: 2019-12-09 | End: 2019-12-09 | Stop reason: HOSPADM

## 2019-12-09 RX ORDER — OMEPRAZOLE 20 MG/1
20 TABLET, DELAYED RELEASE ORAL 2 TIMES DAILY
Status: DISCONTINUED | OUTPATIENT
Start: 2019-12-09 | End: 2019-12-09

## 2019-12-09 RX ORDER — SODIUM CHLORIDE, SODIUM LACTATE, POTASSIUM CHLORIDE, CALCIUM CHLORIDE 600; 310; 30; 20 MG/100ML; MG/100ML; MG/100ML; MG/100ML
INJECTION, SOLUTION INTRAVENOUS CONTINUOUS
Status: DISCONTINUED | OUTPATIENT
Start: 2019-12-09 | End: 2019-12-09 | Stop reason: HOSPADM

## 2019-12-09 RX ORDER — LABETALOL HYDROCHLORIDE 5 MG/ML
5 INJECTION, SOLUTION INTRAVENOUS
Status: DISCONTINUED | OUTPATIENT
Start: 2019-12-09 | End: 2019-12-09 | Stop reason: HOSPADM

## 2019-12-09 RX ORDER — MYCOPHENOLATE MOFETIL 250 MG/1
500 CAPSULE ORAL NIGHTLY
Status: DISCONTINUED | OUTPATIENT
Start: 2019-12-09 | End: 2019-12-10 | Stop reason: HOSPADM

## 2019-12-09 RX ORDER — PROMETHAZINE HYDROCHLORIDE 25 MG/1
12.5-25 TABLET ORAL EVERY 4 HOURS PRN
Status: DISCONTINUED | OUTPATIENT
Start: 2019-12-09 | End: 2019-12-10 | Stop reason: HOSPADM

## 2019-12-09 RX ORDER — LIDOCAINE HYDROCHLORIDE 20 MG/ML
INJECTION, SOLUTION EPIDURAL; INFILTRATION; INTRACAUDAL; PERINEURAL PRN
Status: DISCONTINUED | OUTPATIENT
Start: 2019-12-09 | End: 2019-12-09 | Stop reason: SURG

## 2019-12-09 RX ADMIN — ONDANSETRON 4 MG: 2 INJECTION INTRAMUSCULAR; INTRAVENOUS at 16:20

## 2019-12-09 RX ADMIN — SODIUM CHLORIDE 80 MG: 9 INJECTION, SOLUTION INTRAVENOUS at 21:45

## 2019-12-09 RX ADMIN — ONDANSETRON 4 MG: 2 INJECTION INTRAMUSCULAR; INTRAVENOUS at 15:34

## 2019-12-09 RX ADMIN — SODIUM CHLORIDE 8 MG/HR: 9 INJECTION, SOLUTION INTRAVENOUS at 12:33

## 2019-12-09 RX ADMIN — PROPOFOL 200 MG: 10 INJECTION, EMULSION INTRAVENOUS at 15:34

## 2019-12-09 RX ADMIN — SUCRALFATE 1 G: 1 SUSPENSION ORAL at 22:48

## 2019-12-09 RX ADMIN — FENTANYL CITRATE 25 MCG: 50 INJECTION INTRAMUSCULAR; INTRAVENOUS at 16:43

## 2019-12-09 RX ADMIN — ACETAMINOPHEN 650 MG: 325 TABLET, FILM COATED ORAL at 13:01

## 2019-12-09 RX ADMIN — LABETALOL HYDROCHLORIDE 5 MG: 5 INJECTION INTRAVENOUS at 17:14

## 2019-12-09 RX ADMIN — ONDANSETRON 4 MG: 4 TABLET, ORALLY DISINTEGRATING ORAL at 22:53

## 2019-12-09 RX ADMIN — SUCRALFATE 1 G: 1 SUSPENSION ORAL at 18:14

## 2019-12-09 RX ADMIN — LOSARTAN POTASSIUM 100 MG: 25 TABLET ORAL at 21:02

## 2019-12-09 RX ADMIN — PREDNISONE 5 MG: 5 TABLET ORAL at 18:16

## 2019-12-09 RX ADMIN — ONDANSETRON 4 MG: 2 INJECTION INTRAMUSCULAR; INTRAVENOUS at 12:55

## 2019-12-09 RX ADMIN — FENTANYL CITRATE 25 MCG: 50 INJECTION INTRAMUSCULAR; INTRAVENOUS at 16:32

## 2019-12-09 RX ADMIN — HYDROXYCHLOROQUINE SULFATE 200 MG: 200 TABLET, FILM COATED ORAL at 18:16

## 2019-12-09 RX ADMIN — SUCCINYLCHOLINE CHLORIDE 100 MG: 20 INJECTION, SOLUTION INTRAMUSCULAR; INTRAVENOUS at 15:34

## 2019-12-09 RX ADMIN — SODIUM CHLORIDE: 9 INJECTION, SOLUTION INTRAVENOUS at 15:33

## 2019-12-09 RX ADMIN — PANTOPRAZOLE SODIUM 40 MG: 40 INJECTION, POWDER, LYOPHILIZED, FOR SOLUTION INTRAVENOUS at 11:20

## 2019-12-09 RX ADMIN — LIDOCAINE HYDROCHLORIDE 50 MG: 20 INJECTION, SOLUTION EPIDURAL; INFILTRATION; INTRACAUDAL; PERINEURAL at 15:34

## 2019-12-09 RX ADMIN — SODIUM CHLORIDE: 9 INJECTION, SOLUTION INTRAVENOUS at 15:02

## 2019-12-09 RX ADMIN — ATENOLOL 25 MG: 25 TABLET ORAL at 21:03

## 2019-12-09 RX ADMIN — MYCOPHENOLATE MOFETIL 500 MG: 250 CAPSULE ORAL at 21:02

## 2019-12-09 RX ADMIN — ACETAMINOPHEN 650 MG: 325 TABLET, FILM COATED ORAL at 21:06

## 2019-12-09 RX ADMIN — OMEPRAZOLE 20 MG: 20 CAPSULE, DELAYED RELEASE ORAL at 18:16

## 2019-12-09 ASSESSMENT — ENCOUNTER SYMPTOMS
SHORTNESS OF BREATH: 0
WEAKNESS: 0
DOUBLE VISION: 0
NAUSEA: 0
TINGLING: 0
ROS GI COMMENTS: HEMATEMESIS
PND: 0
PHOTOPHOBIA: 0
HEARTBURN: 1
FEVER: 1
MYALGIAS: 0
TREMORS: 0
BLOOD IN STOOL: 0
BACK PAIN: 0
SPEECH CHANGE: 0
STRIDOR: 0
SORE THROAT: 0
SENSORY CHANGE: 0
VOMITING: 1
VOMITING: 0
CONSTIPATION: 0
HEMOPTYSIS: 0
COUGH: 0
PALPITATIONS: 0
NECK PAIN: 0
FEVER: 0
CHILLS: 0
CLAUDICATION: 0
ABDOMINAL PAIN: 1
HEADACHES: 0
MEMORY LOSS: 0
SPUTUM PRODUCTION: 0
BLURRED VISION: 0
ORTHOPNEA: 0
NERVOUS/ANXIOUS: 0
DEPRESSION: 0
EYE PAIN: 0
DIZZINESS: 0

## 2019-12-09 ASSESSMENT — LIFESTYLE VARIABLES
CONSUMPTION TOTAL: NEGATIVE
HAVE YOU EVER FELT YOU SHOULD CUT DOWN ON YOUR DRINKING: NO
TOTAL SCORE: 0
HAVE PEOPLE ANNOYED YOU BY CRITICIZING YOUR DRINKING: NO
TOTAL SCORE: 0
TOTAL SCORE: 0
EVER HAD A DRINK FIRST THING IN THE MORNING TO STEADY YOUR NERVES TO GET RID OF A HANGOVER: NO
EVER_SMOKED: NEVER
ON A TYPICAL DAY WHEN YOU DRINK ALCOHOL HOW MANY DRINKS DO YOU HAVE: 0
EVER FELT BAD OR GUILTY ABOUT YOUR DRINKING: NO
HOW MANY TIMES IN THE PAST YEAR HAVE YOU HAD 5 OR MORE DRINKS IN A DAY: 0
DOES PATIENT WANT TO STOP DRINKING: NO
DO YOU DRINK ALCOHOL: NO
AVERAGE NUMBER OF DAYS PER WEEK YOU HAVE A DRINK CONTAINING ALCOHOL: 0

## 2019-12-09 ASSESSMENT — PATIENT HEALTH QUESTIONNAIRE - PHQ9
1. LITTLE INTEREST OR PLEASURE IN DOING THINGS: NOT AT ALL
2. FEELING DOWN, DEPRESSED, IRRITABLE, OR HOPELESS: NOT AT ALL
SUM OF ALL RESPONSES TO PHQ9 QUESTIONS 1 AND 2: 0

## 2019-12-09 ASSESSMENT — COGNITIVE AND FUNCTIONAL STATUS - GENERAL
SUGGESTED CMS G CODE MODIFIER DAILY ACTIVITY: CH
DAILY ACTIVITIY SCORE: 24
SUGGESTED CMS G CODE MODIFIER MOBILITY: CH
MOBILITY SCORE: 24

## 2019-12-09 ASSESSMENT — COPD QUESTIONNAIRES
DURING THE PAST 4 WEEKS HOW MUCH DID YOU FEEL SHORT OF BREATH: NONE/LITTLE OF THE TIME
DO YOU EVER COUGH UP ANY MUCUS OR PHLEGM?: NO/ONLY WITH OCCASIONAL COLDS OR INFECTIONS
HAVE YOU SMOKED AT LEAST 100 CIGARETTES IN YOUR ENTIRE LIFE: NO/DON'T KNOW
IN THE PAST 12 MONTHS DO YOU DO LESS THAN YOU USED TO BECAUSE OF YOUR BREATHING PROBLEMS: DISAGREE/UNSURE

## 2019-12-09 ASSESSMENT — PAIN SCALES - GENERAL: PAIN_LEVEL: 0

## 2019-12-09 NOTE — OR NURSING
1330: Rcvd report from MICHELLE Alicea. Pt will be brought to pre-op holding via WC in time for her procedure.   1422: Pt arrived in pre-op holding via WC, transport helped her into the gurney, she tolerated it well.  1425: No pain, no nausea at this time, awake and alert, on room air. Father at bedside.  1438: Pt up to RR, stand-by assist, pt needed no help in the RR, tolerated it well. Nausea increased a little, but no emesis.  1448: Mother arrived and shown to pt's room.  1451: Patient allergies and NPO status verified, home medication reconciliation completed and belongings secured. Patient verbalizes understanding of pain scale, expected course of stay and plan of care. Surgical site verified with patient. IV access established.

## 2019-12-09 NOTE — ED PROVIDER NOTES
"CHIEF COMPLAINT  Chief Complaint   Patient presents with   • Blood in Sputum   • Abdominal Pain       HPI  Lily Nicole is a 24 y.o. female with a history of lupus and end-stage renal disease (dialyzed Monday Wednesday Friday).  She was at dialysis today and was almost finished when she apparently had some low blood pressure felt nauseous and vomited 3 small clots of blood.  She had a similar episode back in October and has been referred to GI.  She has an appointment in January but she does not know who is with.  She notes no further hematemesis.  She had a bowel movement yesterday morning which was normal-brown not black or red.  She notes no diarrhea.  She notes a low-grade temperature this morning around 99 but otherwise has not had a fever body aches cough shortness of breath or other complaints.  In the triage note it does state blood in sputum although in clarification with her she states she did not cough up blood but she vomited blood.  She does note some epigastric discomfort at this time.  No dysuria.  She does make urine.    REVIEW OF SYSTEMS  All other systems are negative.     PAST MEDICAL HISTORY  Past Medical History:   Diagnosis Date   • Anemia 01/17/2018   • AVF (arteriovenous fistula) ([facility])     Right Arm   • Dialysis patient ([facility])      dialysis, M,W,F Elizabeth/Joni   • ESRD (end stage renal disease) on dialysis ([facility]) 01/17/2018    Twan Fu   • Heart burn    • Hypertension 01/17/2018    \"Controlled with medication\"   • Indigestion    • Lupus ([facility])    • Migraines 01/17/2018   • Seizure ([facility]) 2013    from high blood pressure, reports 1 time event       FAMILY HISTORY  No family history on file.    SOCIAL HISTORY  Social History     Socioeconomic History   • Marital status: Single     Spouse name: Not on file   • Number of children: Not on file   • Years of education: Not on file   • Highest education level: Not on file   Occupational History   • Not on file   Social Needs   • Financial resource " "strain: Not on file   • Food insecurity:     Worry: Not on file     Inability: Not on file   • Transportation needs:     Medical: Not on file     Non-medical: Not on file   Tobacco Use   • Smoking status: Never Smoker   • Smokeless tobacco: Never Used   Substance and Sexual Activity   • Alcohol use: No   • Drug use: No   • Sexual activity: Not on file   Lifestyle   • Physical activity:     Days per week: Not on file     Minutes per session: Not on file   • Stress: Not on file   Relationships   • Social connections:     Talks on phone: Not on file     Gets together: Not on file     Attends Latter-day service: Not on file     Active member of club or organization: Not on file     Attends meetings of clubs or organizations: Not on file     Relationship status: Not on file   • Intimate partner violence:     Fear of current or ex partner: Not on file     Emotionally abused: Not on file     Physically abused: Not on file     Forced sexual activity: Not on file   Other Topics Concern   • Not on file   Social History Narrative   • Not on file       SURGICAL HISTORY  Past Surgical History:   Procedure Laterality Date   • ANGIOPLASTY  01/17/2018    \"Right Arm AV-Fistulagram & Angioplastyx3\"   • ULI BY LAPAROSCOPY  4/5/2010    Performed by SYED MARTELL at SURGERY [facility] ORS   • AV FISTULA CREATION Right    • OTHER      renal biopsy x 3   • OTHER      bone marrow biopsy       CURRENT MEDICATIONS  Home Medications     Reviewed by Stephanie Zelaya R.N. (Registered Nurse) on 12/09/19 at 0944  Med List Status: Complete   Medication Last Dose Status   atenolol (TENORMIN) 25 MG Tab 12/8/2019 Active   hydroxychloroquine (PLAQUENIL) 200 MG Tab 12/8/2019 Active   losartan (COZAAR) 100 MG Tab 12/8/2019 Active   mycophenolate sodium (MYFORTIC) 360 MG Tablet Delayed Response tablet 12/8/2019 Active   omeprazole (PRILOSEC OTC) 20 MG tablet 12/8/2019 Active   predniSONE (DELTASONE) 5 MG Tab 12/8/2019 Active          " [FreeTextEntry3] : I directly supervised nurse practitioner Alexys Min and was present during key points of his history and physical. I agree with his history, physical and assessment.\par  "      ALLERGIES  Allergies   Allergen Reactions   • Clindamycin Rash     Hive   • Keflex Rash     Hives   • Metoprolol Nausea       PHYSICAL EXAM  VITAL SIGNS: /74   Pulse 73   Temp 36.6 °C (97.8 °F) (Temporal)   Resp 16   Ht 1.702 m (5' 7\")   Wt 65.3 kg (143 lb 15.4 oz)   LMP 11/11/2019   BMI 22.55 kg/m²      Constitutional: Well developed, Well nourished, No acute distress, Non-toxic appearance.   HENT: Normocephalic, Atraumatic, TMs normal, mucous membranes moist, no erythema, exudates, swelling, or masses, nares patent  Eyes: nonicteric  Neck: Supple, no meningismus  Lymphatic: No lymphadenopathy noted.   Cardiovascular: Regular rate and rhythm, no gallops rubs or murmurs  Lungs: Clear bilaterally   Abdomen: Bowel sounds normal, Soft, mild epigastric tenderness to palpation, no rebound or guarding, no tenderness McBurney's point  Skin: Warm, Dry, no rash  Back: No tenderness, No CVA tenderness.   Genitalia: Deferred  Rectal: Deferred  Extremities: No edema  Neurologic: Alert, appropriate, follows commands, moving all extremities, normal speech   Psychiatric: Affect normal    EKG  Time is 1002, rate 92, sinus rhythm, intervals normal, axis normal, LVH, no significant ST segment elevation or depression, borderline repolarization abnormality laterally, no ectopy    RADIOLOGY/PROCEDURES  DX-CHEST-PORTABLE (1 VIEW)    (Results Pending)     Results for orders placed or performed during the hospital encounter of 12/09/19   CBC WITH DIFFERENTIAL   Result Value Ref Range    WBC 5.2 4.8 - 10.8 K/uL    RBC 3.55 (L) 4.20 - 5.40 M/uL    Hemoglobin 10.0 (L) 12.0 - 16.0 g/dL    Hematocrit 32.6 (L) 37.0 - 47.0 %    MCV 91.8 81.4 - 97.8 fL    MCH 28.2 27.0 - 33.0 pg    MCHC 30.7 (L) 33.6 - 35.0 g/dL    RDW 54.7 (H) 35.9 - 50.0 fL    Platelet Count 151 (L) 164 - 446 K/uL    MPV 9.6 9.0 - 12.9 fL    Neutrophils-Polys 81.10 (H) 44.00 - 72.00 %    Lymphocytes 10.40 (L) 22.00 - 41.00 %    Monocytes 7.10 0.00 - 13.40 %    " Eosinophils 0.40 0.00 - 6.90 %    Basophils 0.60 0.00 - 1.80 %    Immature Granulocytes 0.40 0.00 - 0.90 %    Nucleated RBC 0.00 /100 WBC    Neutrophils (Absolute) 4.20 2.00 - 7.15 K/uL    Lymphs (Absolute) 0.54 (L) 1.00 - 4.80 K/uL    Monos (Absolute) 0.37 0.00 - 0.85 K/uL    Eos (Absolute) 0.02 0.00 - 0.51 K/uL    Baso (Absolute) 0.03 0.00 - 0.12 K/uL    Immature Granulocytes (abs) 0.02 0.00 - 0.11 K/uL    NRBC (Absolute) 0.00 K/uL   COMP METABOLIC PANEL   Result Value Ref Range    Sodium 136 135 - 145 mmol/L    Potassium 3.8 3.6 - 5.5 mmol/L    Chloride 92 (L) 96 - 112 mmol/L    Co2 31 20 - 33 mmol/L    Anion Gap 13.0 (H) 0.0 - 11.9    Glucose 94 65 - 99 mg/dL    Bun 21 8 - 22 mg/dL    Creatinine 5.19 (HH) 0.50 - 1.40 mg/dL    Calcium 9.4 8.4 - 10.2 mg/dL    AST(SGOT) 23 12 - 45 U/L    ALT(SGPT) 13 2 - 50 U/L    Alkaline Phosphatase 51 30 - 99 U/L    Total Bilirubin 0.4 0.1 - 1.5 mg/dL    Albumin 4.0 3.2 - 4.9 g/dL    Total Protein 9.2 (H) 6.0 - 8.2 g/dL    Globulin 5.2 (H) 1.9 - 3.5 g/dL    A-G Ratio 0.8 g/dL   LIPASE   Result Value Ref Range    Lipase 31 7 - 58 U/L   HCG QUAL SERUM   Result Value Ref Range    Beta-Hcg Qualitative Serum Negative Negative   ESTIMATED GFR   Result Value Ref Range    GFR If  13 (A) >60 mL/min/1.73 m 2    GFR If Non African American 10 (A) >60 mL/min/1.73 m 2   EKG (NOW)   Result Value Ref Range    Report       [facility]    Test Date:  2019  Pt Name:    CASE WOODSON            Department: [facility]  MRN:        [id number]                      Room:       SM-ROOM 4  Gender:     Female                       Technician: SYDNIE  :        1995                   Requested By:ELIEL ESPINO  Order #:    [id number]                    Reading MD:    Measurements  Intervals                                Axis  Rate:       92                           P:          50  AK:         160                          QRS:        8  QRSD:        86                           T:          46  QT:         364  QTc:        451    Interpretive Statements  SINUS RHYTHM  CONSIDER LEFT VENTRICULAR HYPERTROPHY  BASELINE WANDER IN LEAD(S) V6  Compared to ECG 12/03/2018 04:09:12  No significant changes         COURSE & MEDICAL DECISION MAKING  Pertinent Labs & Imaging studies reviewed. (See chart for details)  This is a 24-year-old female with a history of end-stage renal disease who was at dialysis earlier today and 5 minutes before the end became nauseated and vomited red blood, approximately 200 cc.  Patient was sent in by Dr. Trent.  Here the patient has had no further emesis.  She is anemic which is likely her baseline-hemoglobin today is 10 and last hemoglobin was 11.2 but she has been as low as 9.3 in the past.  Patient's vitals are reassuring.  The case was discussed with Dr. Kam who is on-call for GI.  Given that the patient will need to be heparinized for further dialysis treatments he recommended admission and he will scope her in the hospital.    FINAL IMPRESSION  1.  Hematemesis  2.   3.         Electronically signed by: Lane Valenzuela, 12/9/2019 9:58 AM

## 2019-12-09 NOTE — ED NOTES
Medicated as ordered.  Awaits admi torders/room assignment w/ sig other at BS and call light in reach.  Aware to call for any needs/changes.

## 2019-12-09 NOTE — ANESTHESIA PREPROCEDURE EVALUATION
Relevant Problems   CARDIAC   (+) Arteriovenous fistula, acquired (HCC)   (+) Hypertension         (+) Chronic kidney disease (CKD) stage G5/A1, glomerular filtration rate (GFR) less than or equal to 15 mL/min/1.73 square meter and albuminuria creatinine ratio less than 30 mg/g (HCC)   (+) End stage renal disease (HCC)       Physical Exam    Airway   Mallampati: II  TM distance: >3 FB  Neck ROM: full       Cardiovascular - normal exam  Rhythm: regular  Rate: normal  (-) murmur     Dental - normal exam         Pulmonary - normal exam  Breath sounds clear to auscultation     Abdominal    Neurological - normal exam                 Anesthesia Plan    ASA 3   ASA physical status 3 criteria: ESRD undergoing regularly scheduled dialysis    Plan - general       Airway plan will be ETT        Induction: intravenous    Postoperative Plan: Postoperative administration of opioids is intended.    Pertinent diagnostic labs and testing reviewed    Informed Consent:    Anesthetic plan and risks discussed with patient.    Use of blood products discussed with: patient whom consented to blood products.

## 2019-12-09 NOTE — CONSULTS
Gastroenterology Consultation    Date of Service  12/9/2019    Referring Physician  Viviana Ambrose M.D.    Consulting Physician  Aaron Kma M.D.    Reason for Consultation  Hematemesis    History of Presenting Illness  24 y.o. female with ESRD, lupus, HTN who presented 12/9/2019 with hematemesis.    She was at dialysis earlier today and near the end of dialysis, she had episode of hematemesis where she vomited 3 red blood clots.  Denies melena, hematochezia.  She had one prior episode to this during dialysis in October as well.  She notes some chronic epigastric pain that waxes and wanes for the past 3 months.  She has been on omeprazole for 2 years.  Denies heartburn, dysphagia, weight loss, chest pain, dyspnea, lightheadedness.  She takes prednisone chronically for lupus.  Denies aspirin or NSAID use.  Uses Tylenol as needed for pain.    In ED, she is hemodynamically stable.  Hgb is 10 which is near baseline with ESRD.  She does get heparin with dialysis but denies other blood thinners.    Review of Systems  Review of Systems   Constitutional: Positive for fever and malaise/fatigue.   Gastrointestinal: Positive for abdominal pain and vomiting.        Hematemesis   All other systems reviewed and are negative.      Past Medical History   has a past medical history of Anemia (01/17/2018), AVF (arteriovenous fistula) ([facility]), Dialysis patient ([facility]), ESRD (end stage renal disease) on dialysis ([facility]) (01/17/2018), Heart burn, Hypertension (01/17/2018), Indigestion, Lupus ([facility]), Migraines (01/17/2018), and Seizure ([facility]) (2013).    Surgical History   has a past surgical history that includes ronak by laparoscopy (4/5/2010); av fistula creation (Right); angioplasty (01/17/2018); other; and other.    Family History  Denies GI cancers    Social History   reports that she has never smoked. She has never used smokeless tobacco. She reports that she does not drink alcohol or use drugs.    Medications  Medications  Prior to Admission   Medication Sig Dispense Refill Last Dose   • losartan (COZAAR) 100 MG Tab Take 100 mg by mouth every day.   12/8/2019 at PM   • omeprazole (PRILOSEC OTC) 20 MG tablet Take 1 Tab by mouth 2 times a day. Indications: Heartburn 30 Tab 0 12/9/2019 at AM   • atenolol (TENORMIN) 25 MG Tab TAKE ONE TABLET BY MOUTH ONCE DAILY 90 Tab 3 12/8/2019 at PM   • hydroxychloroquine (PLAQUENIL) 200 MG Tab TAKE ONE TABLET BY MOUTH AT BEDTIME 30 Tab 0 12/8/2019 at PM   • predniSONE (DELTASONE) 5 MG Tab Take 5 mg by mouth every evening.   12/8/2019 at PM   • mycophenolate sodium (MYFORTIC) 360 MG Tablet Delayed Response tablet Take 360 mg by mouth every evening.   12/8/2019 at PM       Current Facility-Administered Medications:   •  atenolol  •  hydroxychloroquine  •  losartan  •  mycophenolate sodium  •  predniSONE  •  acetaminophen  •  ondansetron  •  ondansetron  •  promethazine  •  promethazine  •  prochlorperazine  •  pantoprazole (PROTONIX) infusion      Allergies  Allergies   Allergen Reactions   • Clindamycin Rash     Hive   • Keflex Rash     Hives   • Metoprolol Nausea       Physical Exam  Temp:  [36.6 °C (97.8 °F)-37.5 °C (99.5 °F)] 37.5 °C (99.5 °F)  Pulse:  [73-95] 92  Resp:  [16-18] 18  BP: (101-135)/(70-85) 122/85  SpO2:  [92 %-99 %] 99 %    Physical Exam  Vitals signs and nursing note reviewed.   Constitutional:       General: She is not in acute distress.     Appearance: Normal appearance. She is not ill-appearing.   HENT:      Head: Normocephalic and atraumatic.      Mouth/Throat:      Mouth: Mucous membranes are moist.      Pharynx: Oropharynx is clear. No oropharyngeal exudate or posterior oropharyngeal erythema.   Eyes:      General: No scleral icterus.     Extraocular Movements: Extraocular movements intact.      Conjunctiva/sclera: Conjunctivae normal.      Pupils: Pupils are equal, round, and reactive to light.   Neck:      Musculoskeletal: Normal range of motion and neck supple. No neck  rigidity or muscular tenderness.   Cardiovascular:      Rate and Rhythm: Normal rate and regular rhythm.      Pulses: Normal pulses.      Heart sounds: Normal heart sounds. No murmur.   Pulmonary:      Effort: Pulmonary effort is normal.      Breath sounds: Normal breath sounds.   Abdominal:      General: Abdomen is flat. Bowel sounds are normal. There is no distension.      Palpations: Abdomen is soft. There is no mass.      Tenderness: There is no tenderness. There is no guarding or rebound.      Hernia: No hernia is present.   Musculoskeletal:      Right lower leg: No edema.      Left lower leg: No edema.   Skin:     General: Skin is warm and dry.      Findings: No erythema.   Neurological:      General: No focal deficit present.      Mental Status: She is alert and oriented to person, place, and time.   Psychiatric:         Mood and Affect: Mood normal.         Behavior: Behavior normal.         Fluids      Laboratory  Recent Labs     12/09/19  1006   WBC 5.2   RBC 3.55*   HEMOGLOBIN 10.0*   HEMATOCRIT 32.6*   MCV 91.8   MCH 28.2   MCHC 30.7*   RDW 54.7*   PLATELETCT 151*   MPV 9.6     Recent Labs     12/09/19  1006   SODIUM 136   POTASSIUM 3.8   CHLORIDE 92*   CO2 31   GLUCOSE 94   BUN 21   CREATININE 5.19*   CALCIUM 9.4                     Imaging  DX-CHEST-PORTABLE (1 VIEW)   Final Result      No acute cardiopulmonary abnormality identified.            Assessment/Plan  23 y/o with ESRD on dialysis, lupus, HTN that presented for hematemesis and more chronic epigastric pain despite PPI.  She does take prednisone chronically.  Suspect UGI bleed from esophagitis, gastritis or PUD, less likely AVMs.  Recommend EGD for evaluation.    PROBLEMS:  1. Hematemesis  2. Chronic anemia related to dialysis  3. Chronic epigastric pain  4. ESRD on dialysis  5. Systemic lupus erythematosus  6. Hypertension    PLAN:  1. EGD today with anesthesia  2. Protonix drip  3. Serial H/H q8 hours  4. NPO

## 2019-12-09 NOTE — PROGRESS NOTES
Spiritual Care Note    Patient Information     Patient's Name: Lily Nicole   MRN: [id number]    YOB: 1995   Age and Gender: 24 y.o. female   Service Area: MEDICAL [facility]   Room (and Bed): [address] [place] [address]   Ethnicity or Nationality:     Primary Language: English   Mosque/Spiritual preference: Pentecostal   Place of Residence: [place], [place]   Medical Diagnosis(-es)/Procedure(s): Acute Lower GI Bleeding   Code Status: Full Code    Date of Admission: 12/9/2019   Length of Stay: 0 days        Spiritual Care Provider Information:  Name of Spiritual Care Provider: Charlette Tafoya  Title of Spiritual Care Provider: Associate   Phone Number: [phone]  E-mail: [email or web address]    minutes    Spiritual Screen Results:    Gen Nursing  Spiritual Screen  Is your spiritual health or inner well-being important to you as you cope with your medical condition?: Yes  Would you like to receive a visit from our Spiritual Care team or your own Oriental orthodox or spiritual leader?: Yes  Was spiritual care education provided to the patient?: Yes     Palliative Care  PC Mosque/Spiritual Screening  Was spiritual care education provided to the patient?: Yes      Encounter/Request Information  Encounter/Request Type     Referral From/ Origin of Request: [facility] nursing    Referral To: Community clergy(TODAY @ 14:00 NOTIFIED [facility] FOR )    Religous Needs/Values       Spiritual Assessment        Notes:

## 2019-12-09 NOTE — ED NOTES
Med Rec completed per patient   Allergies reviewed  No ORAL antibiotics in last 14 days         none

## 2019-12-09 NOTE — ASSESSMENT & PLAN NOTE
On hemodialysis Monday Wednesday Friday, follows with serial [place] [facility]  Already received dialysis this morning

## 2019-12-09 NOTE — ASSESSMENT & PLAN NOTE
Suspect ulcer, given that she is on chronic steroids.  Hemoglobin and hematocrit will be monitored every 8 hours  Clearly she does not have any active vomiting.  Protonix GTT started  GI has been consulted, waiting for recommendations  Provided with ice chips and clear liquids for now in anticipation for a possible endoscopy

## 2019-12-09 NOTE — ED TRIAGE NOTES
Pt ambulates to triage  Chief Complaint   Patient presents with   • Blood in Sputum   • Abdominal Pain   pt reports 5 min before her dialysis ended she coughed up blood clots and now has abd pain 7/10  Hx of same in October  appt to see GI in January  Pt care assumed, assessment completed. Pt denies pain and SOB. Bed in lowest position. Call bell within reach, will continue to monitor.

## 2019-12-09 NOTE — ED NOTES
1006:  PIV placed in LAC, blood drawn and sent to lab.  Repositioned on gurney for comfort.  Whiteboard updated, call light in hand.

## 2019-12-09 NOTE — ANESTHESIA PROCEDURE NOTES
Airway  Date/Time: 12/9/2019 3:34 PM  Performed by: Rodriguez Bravo M.D.  Authorized by: Rodriguez Bravo M.D.     Location:  OR  Urgency:  Elective  Indications for Airway Management:  Anesthesia  Spontaneous Ventilation: absent    Sedation Level:  Deep  Preoxygenated: Yes    Patient Position:  Sniffing  Final Airway Type:  Endotracheal airway  Final Endotracheal Airway:  ETT  Cuffed: Yes    Technique Used for Successful ETT Placement:  Direct laryngoscopy  Insertion Site:  Oral  Blade Type:  Nathalia  Laryngoscope Blade/Videolaryngoscope Blade Size:  3  ETT Size (mm):  6.5  Measured from:  Teeth  ETT to Teeth (cm):  20  Placement Verified by: auscultation and capnometry    Cormack-Lehane Classification:  Grade I - full view of glottis  Number of Attempts at Approach:  1

## 2019-12-09 NOTE — ED NOTES
Report to MICHELLE Burns.  Aware that patient is on the way and that I am available for any questions or concerns regarding the patient.  Preparing for transfer via gurney.  No change in condition on departing ED.

## 2019-12-09 NOTE — H&P
Hospital Medicine History & Physical Note    Date of Service  12/9/2019    Primary Care Physician  Ella Matthew M.D.    Consultants   GI    Code Status  Full Code    Chief Complaint  Chief Complaint   Patient presents with   • Blood in Sputum   • Abdominal Pain       History of Presenting Illness  Martín Nicole is a very pleasant 24 y.o. female with a past medical history of Lupus with lupus nephritis, ESRD on HD M/W/F follows [facility] (),  presented to the emergency room on 12/9/2019 for evaluation of hematemesis that she had today.  Patient said she had vomiting 3 times which included small clots of blood, but no carolann red hematemesis.  Patient says that she has been having heartburn for a long time spite taking her omeprazole.  She also kind of felt feverish today with a temperature around 99 but overall did not have any shortness of breath, melena or hematochezia, denied productive cough.    Review of Systems  Review of Systems   Constitutional: Positive for malaise/fatigue. Negative for chills and fever.   HENT: Negative for congestion, hearing loss, sore throat and tinnitus.    Eyes: Negative for blurred vision, double vision, photophobia and pain.   Respiratory: Negative for cough, hemoptysis, sputum production, shortness of breath and stridor.    Cardiovascular: Negative for chest pain, palpitations, orthopnea, claudication and PND.   Gastrointestinal: Positive for heartburn. Negative for blood in stool, constipation, melena, nausea and vomiting.   Genitourinary: Negative for dysuria, frequency and urgency.   Musculoskeletal: Negative for back pain, myalgias and neck pain.   Neurological: Negative for dizziness, tingling, tremors, sensory change, speech change, weakness and headaches.   Psychiatric/Behavioral: Negative for depression, memory loss and suicidal ideas. The patient is not nervous/anxious.    All other systems reviewed and are negative.      Past Medical  "History  Past Medical History:   Diagnosis Date   • Anemia 01/17/2018   • ESRD (end stage renal disease) on dialysis ([facility]) 01/17/2018    Twan Fu   • Hypertension 01/17/2018    \"Controlled with medication\"   • Migraines 01/17/2018   • Seizure ([facility]) 2013    from high blood pressure, reports 1 time event   • AVF (arteriovenous fistula) ([facility])     Right Arm   • Dialysis patient ([facility])      dialysis, M,W,F [facility]   • Heart burn    • Indigestion    • Lupus ([facility])        Surgical History   has a past surgical history that includes ronak by laparoscopy (4/5/2010); av fistula creation (Right); angioplasty (01/17/2018); other; and other.    Family History  Denies family history of renal diease    Social History   reports that she has never smoked. She has never used smokeless tobacco. She reports that she does not drink alcohol or use drugs.    Allergies  Allergies   Allergen Reactions   • Clindamycin Rash     Hive   • Keflex Rash     Hives   • Metoprolol Nausea       Medications  Prior to Admission medications    Medication Sig Start Date End Date Taking? Authorizing Provider   losartan (COZAAR) 100 MG Tab Take 100 mg by mouth every day.    Physician Outpatient   omeprazole (PRILOSEC OTC) 20 MG tablet Take 1 Tab by mouth 2 times a day. Indications: Heartburn 10/21/19   Skinny Pérez M.D.   atenolol (TENORMIN) 25 MG Tab TAKE ONE TABLET BY MOUTH ONCE DAILY 3/8/19   Melinda Trent M.D.   hydroxychloroquine (PLAQUENIL) 200 MG Tab TAKE ONE TABLET BY MOUTH AT BEDTIME 1/25/18   Gloria Rick M.D.   predniSONE (DELTASONE) 5 MG Tab Take 5 mg by mouth every evening.    Physician Outpatient   mycophenolate sodium (MYFORTIC) 360 MG Tablet Delayed Response tablet Take 360 mg by mouth every evening.    Physician Outpatient       Physical Exam  Temp:  [36.6 °C (97.8 °F)] 36.6 °C (97.8 °F)  Pulse:  [73-95] 95  Resp:  [16] 16  BP: (101-135)/(70-77) 135/77  SpO2:  [88 %-94 %] 88 %  Physical Exam   Constitutional: She is oriented to " person, place, and time. She appears well-developed and well-nourished. No distress.   HENT:   Head: Normocephalic and atraumatic.   Mouth/Throat: No oropharyngeal exudate.   Eyes: Pupils are equal, round, and reactive to light. Conjunctivae are normal. Right eye exhibits no discharge. No scleral icterus.   Neck: Neck supple. No JVD present. No thyromegaly present.   Cardiovascular: Normal rate and intact distal pulses.   No murmur heard.  Pulmonary/Chest: Effort normal and breath sounds normal. No stridor. No respiratory distress. She has no wheezes. She has no rales.   Abdominal: Soft. Bowel sounds are normal. She exhibits no distension. There is no tenderness. There is no rebound.   Musculoskeletal: Normal range of motion.         General: No edema.      Comments: Right forearm AV fistula, with strong palpable thrill   Neurological: She is alert and oriented to person, place, and time. No cranial nerve deficit.   Skin: Skin is warm. She is not diaphoretic. No erythema.   Psychiatric: She has a normal mood and affect. Her behavior is normal. Thought content normal.   Nursing note and vitals reviewed.      Laboratory:  Recent Labs     12/09/19  1006   WBC 5.2   RBC 3.55*   HEMOGLOBIN 10.0*   HEMATOCRIT 32.6*   MCV 91.8   MCH 28.2   MCHC 30.7*   RDW 54.7*   PLATELETCT 151*   MPV 9.6     Recent Labs     12/09/19  1006   SODIUM 136   POTASSIUM 3.8   CHLORIDE 92*   CO2 31   GLUCOSE 94   BUN 21   CREATININE 5.19*   CALCIUM 9.4     Recent Labs     12/09/19  1006   ALTSGPT 13   ASTSGOT 23   ALKPHOSPHAT 51   TBILIRUBIN 0.4   LIPASE 31   GLUCOSE 94               Urinalysis:          Imaging:  DX-CHEST-PORTABLE (1 VIEW)    (Results Pending)       Assessment/Plan:  I anticipate this patient will require at least two midnights for appropriate medical management, necessitating inpatient admission.    * Hematemesis  Assessment & Plan  Suspect ulcer, given that she is on chronic steroids.  Hemoglobin and hematocrit will be  monitored every 8 hours  Clearly she does not have any active vomiting.  Protonix GTT started  GI has been consulted, waiting for recommendations  Provided with ice chips and clear liquids for now in anticipation for a possible endoscopy    Lupus (HCC)  Assessment & Plan  In remission per patient  Home dose of Plaquenil, prednisone and mycophenolate    Hypertension  Assessment & Plan  Overall well controlled  Resume home dose of losartan 100 mg daily, atenolol 25 mg daily    End stage renal disease (HCC)- (present on admission)  Assessment & Plan  On hemodialysis Monday Wednesday Friday, follows with [facility] [place] [facility]  Already received dialysis this morning      VTE prophylaxis: Prophylaxis: SCDs

## 2019-12-10 VITALS
DIASTOLIC BLOOD PRESSURE: 90 MMHG | RESPIRATION RATE: 16 BRPM | OXYGEN SATURATION: 100 % | SYSTOLIC BLOOD PRESSURE: 137 MMHG | TEMPERATURE: 97.6 F | HEIGHT: 67 IN | BODY MASS INDEX: 22.6 KG/M2 | WEIGHT: 143.96 LBS | HEART RATE: 94 BPM

## 2019-12-10 PROBLEM — K92.0 HEMATEMESIS: Status: RESOLVED | Noted: 2019-12-09 | Resolved: 2019-12-10

## 2019-12-10 LAB
ALBUMIN SERPL BCP-MCNC: 3.4 G/DL (ref 3.2–4.9)
ALBUMIN/GLOB SERPL: 0.7 G/DL
ALP SERPL-CCNC: 40 U/L (ref 30–99)
ALT SERPL-CCNC: 13 U/L (ref 2–50)
ANION GAP SERPL CALC-SCNC: 16 MMOL/L (ref 0–11.9)
AST SERPL-CCNC: 25 U/L (ref 12–45)
BASOPHILS # BLD AUTO: 0.6 % (ref 0–1.8)
BASOPHILS # BLD: 0.02 K/UL (ref 0–0.12)
BILIRUB SERPL-MCNC: 0.4 MG/DL (ref 0.1–1.5)
BUN SERPL-MCNC: 30 MG/DL (ref 8–22)
CALCIUM SERPL-MCNC: 8.7 MG/DL (ref 8.4–10.2)
CHLORIDE SERPL-SCNC: 94 MMOL/L (ref 96–112)
CO2 SERPL-SCNC: 25 MMOL/L (ref 20–33)
CREAT SERPL-MCNC: 6.8 MG/DL (ref 0.5–1.4)
EOSINOPHIL # BLD AUTO: 0 K/UL (ref 0–0.51)
EOSINOPHIL NFR BLD: 0 % (ref 0–6.9)
ERYTHROCYTE [DISTWIDTH] IN BLOOD BY AUTOMATED COUNT: 55.8 FL (ref 35.9–50)
GLOBULIN SER CALC-MCNC: 4.6 G/DL (ref 1.9–3.5)
GLUCOSE SERPL-MCNC: 83 MG/DL (ref 65–99)
HCT VFR BLD AUTO: 27.6 % (ref 37–47)
HGB BLD-MCNC: 8.4 G/DL (ref 12–16)
HGB BLD-MCNC: 9.3 G/DL (ref 12–16)
IMM GRANULOCYTES # BLD AUTO: 0.01 K/UL (ref 0–0.11)
IMM GRANULOCYTES NFR BLD AUTO: 0.3 % (ref 0–0.9)
LYMPHOCYTES # BLD AUTO: 0.55 K/UL (ref 1–4.8)
LYMPHOCYTES NFR BLD: 15.9 % (ref 22–41)
MCH RBC QN AUTO: 28.5 PG (ref 27–33)
MCHC RBC AUTO-ENTMCNC: 30.4 G/DL (ref 33.6–35)
MCV RBC AUTO: 93.6 FL (ref 81.4–97.8)
MONOCYTES # BLD AUTO: 0.42 K/UL (ref 0–0.85)
MONOCYTES NFR BLD AUTO: 12.1 % (ref 0–13.4)
NEUTROPHILS # BLD AUTO: 2.46 K/UL (ref 2–7.15)
NEUTROPHILS NFR BLD: 71.1 % (ref 44–72)
NRBC # BLD AUTO: 0 K/UL
NRBC BLD-RTO: 0 /100 WBC
PLATELET # BLD AUTO: 124 K/UL (ref 164–446)
PMV BLD AUTO: 9.8 FL (ref 9–12.9)
POTASSIUM SERPL-SCNC: 5 MMOL/L (ref 3.6–5.5)
PROT SERPL-MCNC: 8 G/DL (ref 6–8.2)
RBC # BLD AUTO: 2.95 M/UL (ref 4.2–5.4)
SODIUM SERPL-SCNC: 135 MMOL/L (ref 135–145)
WBC # BLD AUTO: 3.5 K/UL (ref 4.8–10.8)

## 2019-12-10 PROCEDURE — 85025 COMPLETE CBC W/AUTO DIFF WBC: CPT

## 2019-12-10 PROCEDURE — 80053 COMPREHEN METABOLIC PANEL: CPT

## 2019-12-10 PROCEDURE — 99239 HOSP IP/OBS DSCHRG MGMT >30: CPT | Performed by: INTERNAL MEDICINE

## 2019-12-10 PROCEDURE — 700102 HCHG RX REV CODE 250 W/ 637 OVERRIDE(OP): Performed by: INTERNAL MEDICINE

## 2019-12-10 PROCEDURE — 85018 HEMOGLOBIN: CPT

## 2019-12-10 PROCEDURE — 700111 HCHG RX REV CODE 636 W/ 250 OVERRIDE (IP): Performed by: HOSPITALIST

## 2019-12-10 PROCEDURE — A9270 NON-COVERED ITEM OR SERVICE: HCPCS | Performed by: INTERNAL MEDICINE

## 2019-12-10 RX ORDER — SUCRALFATE 1 G/1
1 TABLET ORAL
Qty: 56 TAB | Refills: 0 | Status: SHIPPED | OUTPATIENT
Start: 2019-12-10 | End: 2019-12-24

## 2019-12-10 RX ORDER — MORPHINE SULFATE 4 MG/ML
2 INJECTION, SOLUTION INTRAMUSCULAR; INTRAVENOUS ONCE
Status: COMPLETED | OUTPATIENT
Start: 2019-12-10 | End: 2019-12-10

## 2019-12-10 RX ADMIN — ONDANSETRON 4 MG: 2 INJECTION INTRAMUSCULAR; INTRAVENOUS at 10:03

## 2019-12-10 RX ADMIN — MORPHINE SULFATE 2 MG: 4 INJECTION INTRAVENOUS at 05:45

## 2019-12-10 RX ADMIN — SUCRALFATE 1 G: 1 SUSPENSION ORAL at 12:18

## 2019-12-10 RX ADMIN — ONDANSETRON 4 MG: 2 INJECTION INTRAMUSCULAR; INTRAVENOUS at 05:44

## 2019-12-10 ASSESSMENT — ENCOUNTER SYMPTOMS
BLOOD IN STOOL: 0
CONSTIPATION: 0
FEVER: 0
NAUSEA: 1
DIARRHEA: 0
CHILLS: 0
ABDOMINAL PAIN: 0
SHORTNESS OF BREATH: 0
HEARTBURN: 0
VOMITING: 1

## 2019-12-10 NOTE — CARE PLAN
Problem: Knowledge Deficit  Goal: Knowledge of disease process/condition, treatment plan, diagnostic tests, and medications will improve  Outcome: PROGRESSING AS EXPECTED   Discussed plan of care with patient including IV Protonix, nausea and tylenol administered as well as scheduled EGD and CHG bath. Allowed time for question, patient agreed and verbalized understanding.     Problem: Bowel/Gastric:  Goal: Normal bowel function is maintained or improved  Outcome: PROGRESSING SLOWER THAN EXPECTED  Patient was complaining of nausea upon admission, medicated per MAR. Patient has not had emesis, will continue to monitor.

## 2019-12-10 NOTE — OR SURGEON
Immediate Post OP Note    PreOp Diagnosis: Hematemesis    PostOp Diagnosis: Bleeding gastropathy, gastritis    Procedure(s):  GASTROSCOPY with APC and cold forceps biopsies - Wound Class: Clean Contaminated    Surgeon(s):  Aaron Kam M.D.    Anesthesiologist/Type of Anesthesia:  Anesthesiologist: Rodriguez Bravo M.D./General    Surgical Staff:  Circulator: Sarah Lundberg R.N.  Endoscopy Technician: Lizett Canada    Specimens removed if any:  ID Type Source Tests Collected by Time Destination   A : Gastric  Tissue Gastric PATHOLOGY SPECIMEN Aaron Kam M.D. 12/9/2019  4:04 PM        Estimated Blood Loss: None    Findings:   1. Several areas of oozing gastropathy, not clearly AVMs, circumferentially just distal to GE junction.  Several areas ablated with APC therapy.  Also one area of oozing in antrum ablated with APC therapy.  2. Diffuse gastric erythema    Complications: None        12/9/2019 4:08 PM Aaron Kam M.D.

## 2019-12-10 NOTE — PROGRESS NOTES
2 RN skin check complete.   Devices in place tele box.  Skin assessed under devices yes.  Confirmed pressure ulcers found on n/a.  New potential pressure ulcers noted on n/a. Wound consult placed n/a.  The following interventions in place patient is ambulatory, encouraged to turn in bed and provided pillows for support.

## 2019-12-10 NOTE — PROGRESS NOTES
Telemetry Shift Summary    Rhythm SR ST  HR Range 80-low 100s  Ectopy  Per MT Jamila: no ectopy    Measurements for strip printed 12/09/2019 at 1904:   HR 97     0.18/0.08/0.34        Normal Values  Rhythm SR  HR Range    Measurements 0.12-0.20 / 0.06-0.10  / 0.30-0.52

## 2019-12-10 NOTE — ANESTHESIA QCDR
2019 [facility] Clinical Data Registry (for Quality Improvement)     Postoperative nausea/vomiting risk protocol (Adult = 18 yrs and Pediatric 3-17 yrs)- (430 and 463)  General inhalation anesthetic (NOT TIVA) with PONV risk factors: Yes  Provision of anti-emetic therapy with at least 2 different classes of agents: Yes   Patient DID NOT receive anti-emetic therapy and reason is documented in Medical Record:  N/A    Multimodal Pain Management- (AQI59)  Patient undergoing Elective Surgery (i.e. Outpatient, or ASC, or Prescheduled Surgery prior to Hospital Admission): Yes  Use of Multimodal Pain Management, two or more drugs and/or interventions, NOT including systemic opioids: Yes   Exception: Documented allergy to multiple classes of analgesics:  N/A    PACU assessment of acute postoperative pain prior to Anesthesia Care End- Applies to Patients Age = 18- (ABG7)  Initial PACU pain score is which of the following: < 7/10  Patient unable to report pain score: N/A    Post-anesthetic transfer of care checklist/protocol to PACU/ICU- (426 and 427)  Upon conclusion of case, patient transferred to which of the following locations: PACU/Non-ICU  Use of transfer checklist/protocol: Yes  Exclusion: Service Performed in Patient Hospital Room (and thus did not require transfer): N/A    PACU Reintubation- (AQI31)  General anesthesia requiring endotracheal intubation (ETT) along with subsequent extubation in OR or PACU: Yes  Required reintubation in the PACU: No   Extubation was a planned trial documented in the medical record prior to removal of the original airway device:  N/A    Unplanned admission to ICU related to anesthesia service up through end of PACU care- (MD51)  Unplanned admission to ICU (not initially anticipated at anesthesia start time): No

## 2019-12-10 NOTE — CARE PLAN
Problem: Communication  Goal: The ability to communicate needs accurately and effectively will improve  Outcome: PROGRESSING AS EXPECTED   A&Ox4, able to make needs known.     Problem: Safety  Goal: Will remain free from injury  Outcome: PROGRESSING AS EXPECTED   Self care in room.     Problem: Infection  Goal: Will remain free from infection  Outcome: PROGRESSING AS EXPECTED   VSS.     Problem: Venous Thromboembolism (VTW)/Deep Vein Thrombosis (DVT) Prevention:  Goal: Patient will participate in Venous Thrombosis (VTE)/Deep Vein Thrombosis (DVT)Prevention Measures  Outcome: PROGRESSING AS EXPECTED   SCD's in room.     Problem: Bowel/Gastric:  Goal: Normal bowel function is maintained or improved  Outcome: PROGRESSING AS EXPECTED   LBM: 12/8. Intermittent nausea this morning, PRN zofran given Q4 hrs. No emesis this shift.     Problem: Pain Management  Goal: Pain level will decrease to patient's comfort goal  Outcome: PROGRESSING AS EXPECTED   Denies abd pain.     Problem: Fluid Volume:  Goal: Will maintain balanced intake and output  Outcome: PROGRESSING AS EXPECTED   Dialysis pt MWF. R AV fistula WNL.     Supportive friends in room this morning.

## 2019-12-10 NOTE — CARE PLAN
Problem: Communication  Goal: The ability to communicate needs accurately and effectively will improve  Outcome: PROGRESSING AS EXPECTED  Note:   AO4 able to verbalize needs clearly. Demonstrates appropriate use of call light.        Problem: Safety  Goal: Will remain free from falls  Outcome: PROGRESSING AS EXPECTED  Intervention: Assess risk factors for falls  Flowsheets  Taken 12/9/2019 2100  Pt Calls for Assistance: Yes;No assistance required  Taken 12/9/2019 2209  Fall Risk: Risk to Fall -  0 - 1 point  History of fall: 0  Mobility Status Assessment: 0-Ambulates & Transfers Independently. No Assistance Required  Risk for Injury-Any positive answers results in the pt being at high risk for fall related injury: Not Applicable  Intervention: Implement fall precautions  Flowsheets  Taken 12/9/2019 2100  Environmental Precautions: Treaded Slipper Socks on Patient;Personal Belongings, Wastebasket, Call Bell etc. in Easy Reach;Report Given to Other Health Care Providers Regarding Fall Risk;Communication Sign for Patients & Families;Bed in Low Position;Mobility Assessed & Appropriate Sign Placed  Taken 12/9/2019 2209  IV Pole on Same Side of Bed as Bathroom: Yes

## 2019-12-10 NOTE — PROGRESS NOTES
1900: Received report from jonah Alicea RN. Pt status and POC discussed. Pt is in bed with no signs of distress, calm with unlabored breathing. Denies any needs at this time. Fall precautions in place. Call light within reach.   2100: c/o mild nausea, prn zofran given. C/o sore throat from EGD, tylenol and ice chips given.   0530: pt slept well overnight without any issues. At morning med pass pt c/o nausea with vomiting x 3 (moderate amount, thin watery yellow/brown no obvious blood) . And c/o 9/10 headache. Dr Madison notified, new orders received. PRN morphine IV and zofran IV given.   0610: pt now sleeping in bed, calm with unlabored breathing. No further emesis after zofran.   0700: Report given to MICHELLE Dexter.

## 2019-12-10 NOTE — PROGRESS NOTES
Gastroenterology Progress Note     Author: Aaron Kam   Date & Time Created: 12/10/2019 1:50 PM    Chief Complaint:  Hematemesis    Interval History:  12/10/2019: Had nonbloody emesis this AM but feels better now.  No ab pain.  Hgb stable.  EGD with bleeding gastropathy from GE junction treated with APC therapy, likely from renal disease    Review of Systems:  Review of Systems   Constitutional: Negative for chills and fever.   Respiratory: Negative for shortness of breath.    Cardiovascular: Negative for chest pain.   Gastrointestinal: Positive for nausea and vomiting. Negative for abdominal pain, blood in stool, constipation, diarrhea, heartburn and melena.       Physical Exam:  Physical Exam  Vitals signs and nursing note reviewed.   Constitutional:       Appearance: Normal appearance.   Cardiovascular:      Rate and Rhythm: Normal rate and regular rhythm.   Pulmonary:      Effort: Pulmonary effort is normal.      Breath sounds: Normal breath sounds.   Abdominal:      General: Abdomen is flat. Bowel sounds are normal. There is no distension.      Palpations: Abdomen is soft. There is no mass.      Tenderness: There is no tenderness. There is no guarding or rebound.      Hernia: No hernia is present.   Neurological:      Mental Status: She is alert.         Labs:          Recent Labs     12/09/19  1006 12/10/19  0324   SODIUM 136 135   POTASSIUM 3.8 5.0   CHLORIDE 92* 94*   CO2 31 25   BUN 21 30*   CREATININE 5.19* 6.80*   CALCIUM 9.4 8.7     Recent Labs     12/09/19  1006 12/10/19  0324   ALTSGPT 13 13   ASTSGOT 23 25   ALKPHOSPHAT 51 40   TBILIRUBIN 0.4 0.4   LIPASE 31  --    GLUCOSE 94 83     Recent Labs     12/09/19  1006 12/09/19  1822 12/10/19  0324 12/10/19  1110   RBC 3.55*  --  2.95*  --    HEMOGLOBIN 10.0* 9.2* 8.4* 9.3*   HEMATOCRIT 32.6*  --  27.6*  --    PLATELETCT 151*  --  124*  --      Recent Labs     12/09/19  1006 12/10/19  0324   WBC 5.2 3.5*   NEUTSPOLYS 81.10* 71.10   LYMPHOCYTES  10.40* 15.90*   MONOCYTES 7.10 12.10   EOSINOPHILS 0.40 0.00   BASOPHILS 0.60 0.60   ASTSGOT 23 25   ALTSGPT 13 13   ALKPHOSPHAT 51 40   TBILIRUBIN 0.4 0.4     Hemodynamics:  Temp (24hrs), Av.8 °C (98.3 °F), Min:36.4 °C (97.6 °F), Max:37.3 °C (99.1 °F)  Temperature: 36.4 °C (97.6 °F)  Pulse  Av.6  Min: 73  Max: 104   Blood Pressure: 137/90     Respiratory:    Respiration: 16, Pulse Oximetry: 100 %           Fluids:    Intake/Output Summary (Last 24 hours) at 12/10/2019 1350  Last data filed at 12/10/2019 0807  Gross per 24 hour   Intake 420 ml   Output 250 ml   Net 170 ml     Weight: 65.3 kg (143 lb 15.4 oz)  GI/Nutrition:  Orders Placed This Encounter   Procedures   • Diet Order Renal     Standing Status:   Standing     Number of Occurrences:   1     Order Specific Question:   Diet:     Answer:   Renal [8]     Medical Decision Making, by Problem:  Active Hospital Problems    Diagnosis   • *Hematemesis [K92.0]   • Lupus (HCC) [M32.9]   • Hypertension [I10]   • End stage renal disease (HCC) [N18.6]     PROBLEMS:  1. Hematemesis.  Secondary to oozing gastropathy at GE junction treated with APC therapy.  Suspect renal related versus less likely from liver disease or coagulopathy.  Hgb stable.  No further bleeding seen.  2. Chronic anemia related to dialysis  3. Chronic epigastric pain  4. ESRD on dialysis  5. Systemic lupus erythematosus  6. Hypertension    Plan:  1. OK for discharge from GI standpoint.  Recommend omeprazole twice daily and sucralfate for 7 days.  Will arrange GI follow-up in 4-6 weeks    Quality-Core Measures   Reviewed items::  Radiology images reviewed, Labs reviewed and Medications reviewed

## 2019-12-10 NOTE — PROGRESS NOTES
Discharge Instructions    Discussed discharge instructions. Tele and IV D/C. Belongings gathered and given to pt upon leaving the unit. Discharged home with relative via car. All questions and concerns addressed.

## 2019-12-10 NOTE — DISCHARGE SUMMARY
"Discharge Summary    CHIEF COMPLAINT ON ADMISSION  Chief Complaint   Patient presents with   • Blood in Sputum   • Abdominal Pain       Reason for Admission  Coughing Blood during dialysis     Admission Date  12/9/2019    CODE STATUS  Full Code    HPI & HOSPITAL COURSE  This is a 24 y.o. female here with  Coughing up blood. She was admitted and hemoglobin dropped to 8.4 and then improved to 9.3. GI was consulted and endoscopy did show gastritis with bleeding gastropathy at the gastroesophageal junction. This was treated with APC therapy. The patient had resolution of her abdominal pain, no farther bleeding and hemoglobin stabilized.       Therefore, she is discharged in good and stable condition to home with close outpatient follow-up.    The patient recovered much more quickly than anticipated on admission.    Discharge Date  12/10/2019    FOLLOW UP ITEMS POST DISCHARGE  GI follow up with Dr. Kam in 4-6 weeks    DISCHARGE DIAGNOSES  Principal Problem (Resolved):    Hematemesis POA: Yes  Active Problems:    End stage renal disease (HCC) POA: Yes    Hypertension POA: Yes      Overview: \"Controlled with medication\"    Lupus (HCC) POA: Yes      FOLLOW UP  No future appointments.  Ella Matthew M.D.  [address] [place] [address]  [employer] [address]  [phone]          Aaron Kam M.D.  [id number]  [place] [address]  [phone]    In 4 weeks        MEDICATIONS ON DISCHARGE     Medication List      START taking these medications      Instructions   sucralfate 1 GM Tabs  Commonly known as:  CARAFATE   Take 1 Tab by mouth 4 Times a Day,Before Meals and at Bedtime for 14 days.  Dose:  1 g        CONTINUE taking these medications      Instructions   atenolol 25 MG Tabs  Commonly known as:  TENORMIN   TAKE ONE TABLET BY MOUTH ONCE DAILY     hydroxychloroquine 200 MG Tabs  Commonly known as:  PLAQUENIL   Doctor's comments:  Please consider 90 day supplies to promote better adherence  TAKE ONE TABLET BY MOUTH " AT BEDTIME     losartan 100 MG Tabs  Commonly known as:  COZAAR   Take 100 mg by mouth every day.  Dose:  100 mg     MYFORTIC 360 MG Tbec tablet  Generic drug:  mycophenolate sodium   Take 360 mg by mouth every evening.  Dose:  360 mg     omeprazole 20 MG tablet  Commonly known as:  PRILOSEC OTC   Take 1 Tab by mouth 2 times a day. Indications: Heartburn  Dose:  20 mg     predniSONE 5 MG Tabs  Commonly known as:  DELTASONE   Take 5 mg by mouth every evening.  Dose:  5 mg            Allergies  Allergies   Allergen Reactions   • Clindamycin Rash     Hive   • Keflex Rash     Hives   • Metoprolol Nausea       DIET  Orders Placed This Encounter   Procedures   • Diet Order Renal     Standing Status:   Standing     Number of Occurrences:   1     Order Specific Question:   Diet:     Answer:   Renal [8]       ACTIVITY  As tolerated.  Weight bearing as tolerated    CONSULTATIONS  GI Dr. Kam    PROCEDURES  Endoscopy with APC therapy for bleeding gastritis    LABORATORY  Lab Results   Component Value Date    SODIUM 135 12/10/2019    POTASSIUM 5.0 12/10/2019    CHLORIDE 94 (L) 12/10/2019    CO2 25 12/10/2019    GLUCOSE 83 12/10/2019    BUN 30 (H) 12/10/2019    CREATININE 6.80 (HH) 12/10/2019        Lab Results   Component Value Date    WBC 3.5 (L) 12/10/2019    HEMOGLOBIN 9.3 (L) 12/10/2019    HEMATOCRIT 27.6 (L) 12/10/2019    PLATELETCT 124 (L) 12/10/2019        Total time of the discharge process exceeds 47 minutes.

## 2019-12-10 NOTE — PROCEDURES
DATE OF SERVICE:  12/09/2019    PROCEDURE:  Upper endoscopy with APC therapy and cold forceps biopsies.    :  Aaron Kam MD    INDICATION:  The patient is a 24-year-old female with end-stage renal disease,   on dialysis, lupus, who presented for evaluation of hematemesis during   dialysis and chronic epigastric discomfort.  She is on a PPI daily.    INFORMED CONSENT:  Written informed consent was obtained from the patient   after thorough explanation of indications, benefits and risks of the   procedure, which included but was not limited to bleeding, infection,   perforation, adverse reaction to medications and missed lesions.    MEDICATIONS GIVEN:  Monitored anesthesia care with propofol.    Continuous telemetry, BP, pulse oximetry, and capnography were performed by   the anesthesiologist throughout the procedure.    A midsize flexible upper endoscope was used for the procedure.    FINDINGS:  Patient was placed in the left lateral decubitus position.  After   anesthesia was achieved, the endoscope was passed into the posterior pharynx   under direct visualization and advanced to second portion of the duodenum   without difficulty.  The patient tolerated procedure well with following   findings:  1.  Esophagus:  Normal.  2.  Stomach:  She did have evidence of diffuse gastric erythema as well as   areas of slowly oozing gastropathy without clear AVMs.  She had several   discrete areas of oozing suspected gastropathy circumferentially about the   cardia just distal to the gastroesophageal junction.  These were washed   several times and continued to ooze slowly.  The etiology of the gastropathy   is somewhat unclear, but consider dialysis related worsened by heparin.    Several of the oozing areas were ablated with APC therapy with obliteration.    She did have a single oozing area within the antrum as well, which was ablated   with APC therapy.  Random gastric biopsies were obtained for H.  pylori.  3.  Duodenum normal.    IMPRESSION:  Oozing gastropathy, question dialysis or renal related versus   less likely Helicobacter pylori gastritis versus concomitant bleeding   disorder.  Several areas of oozing treated with APC therapy.  Gastric biopsies   obtained for Helicobacter pylori.    PLAN:  1.  Await biopsies.  2.  PPI twice daily.  3.  Add sucralfate for 2 weeks.  4.  If she continues to have further episodes, may need to consider evaluating   for bleeding disorder or coagulopathy.  5.  Advance diet.       ____________________________________     MD MARILEE FISHER / NTS    DD:  12/09/2019 16:16:00  DT:  12/09/2019 17:07:18    D#:  [id number]  Job#:  [id number]

## 2019-12-10 NOTE — PROGRESS NOTES
Telemetry Shift Summary    Rhythm SR  HR Range 90s  Ectopy none  Measurements .14/.08/.36        Normal Values  Rhythm SR  HR Range    Measurements 0.12-0.20 / 0.06-0.10  / 0.30-0.52

## 2019-12-10 NOTE — PROGRESS NOTES
Report given to Miguelina MARTINEZ. Plan of care discussed. Patient resting comfortably in bed. Safety precautions in place.

## 2019-12-10 NOTE — ANESTHESIA POSTPROCEDURE EVALUATION
Patient: Lily Nicole    Procedure Summary     Date:  12/09/19 Room / Location:   ENDOSCOPIC ULTRASOUND ROOM / SURGERY [facility]    Anesthesia Start:  1533 Anesthesia Stop:      Procedure:  GASTROSCOPY Diagnosis:  (GASTROPATHY)    Surgeon:  Aaron Kam M.D. Responsible Provider:  Rodriguez Bravo M.D.    Anesthesia Type:  general ASA Status:  3          Final Anesthesia Type: general  Last vitals  BP   Blood Pressure: 102/68    Temp   37.3 °C (99.1 °F)    Pulse   Pulse: 89   Resp   16    SpO2   95 %      Anesthesia Post Evaluation    Patient location during evaluation: PACU  Patient participation: complete - patient participated  Level of consciousness: awake and alert  Pain score: 0    Airway patency: patent  Anesthetic complications: no  Cardiovascular status: hemodynamically stable  Respiratory status: acceptable  Hydration status: euvolemic    PONV: none           Nurse Pain Score: 0 (NPRS)

## 2019-12-10 NOTE — OR NURSING
1612: Pt arrived to PACU in stretcher in supine position. Pt s/p episode of vomiting upon waking in OR per anesthesia. No signs of resp distress.Soiled linens changed.  Pt continues to complain of nausea.Please see MAR for anti-emetic administration. No additional episodes of vomiting in PACU.    1617: Dr. Kam at bedside to explain results to pt. Per cheri FERRO to progress to ice chips and sips of water at this time.     1623: Pt tolerating ice chips and states nausea is no longer present. Increased BP and pain beginning to decline after Fentanyl administration. Please see MAR and flow sheet.      1647: Pt states she has headache pain. Present pre-op but slightly worse in PACU. Throat pain decreasing with Fentanyl administration. BP remains <170 systolic at this time.  Mother updated via phone regarding pt's status.     1710: Per cheri Porter to given 5mg/1mL dose of labetalol for /95 due to pt being outside 20% baseline and headache despite >170 systolic parameter inn medication orders.

## 2019-12-10 NOTE — ANESTHESIA TIME REPORT
Anesthesia Start and Stop Event Times     Date Time Event    12/9/2019 1519 Ready for Procedure     1533 Anesthesia Start     1615 Anesthesia Stop        Responsible Staff  12/09/19    Name Role Begin End    Rodriguez Bravo M.D. Anesth 1533 1615        Preop Diagnosis (Free Text):  Pre-op Diagnosis     GASTROPATHY        Preop Diagnosis (Codes):    Post op Diagnosis  Gastropathy      Premium Reason  K. Alert    Comments:

## 2019-12-10 NOTE — PROGRESS NOTES
Spoke to Dr. Trent over the phone, patient was seen by her today and since patient completed dialysis today, she does not need to be seen by her today per MD.

## 2019-12-11 LAB — EKG IMPRESSION: NORMAL

## 2020-01-25 ENCOUNTER — APPOINTMENT (OUTPATIENT)
Dept: RADIOLOGY | Facility: MEDICAL CENTER | Age: 25
End: 2020-01-25
Attending: EMERGENCY MEDICINE
Payer: COMMERCIAL

## 2020-01-25 ENCOUNTER — HOSPITAL ENCOUNTER (OUTPATIENT)
Facility: MEDICAL CENTER | Age: 25
End: 2020-01-28
Attending: EMERGENCY MEDICINE | Admitting: INTERNAL MEDICINE
Payer: COMMERCIAL

## 2020-01-25 DIAGNOSIS — R06.01 ORTHOPNEA: ICD-10-CM

## 2020-01-25 DIAGNOSIS — R79.89 ELEVATED TROPONIN: ICD-10-CM

## 2020-01-25 DIAGNOSIS — N18.6 STAGE 5 CHRONIC KIDNEY DISEASE ON CHRONIC DIALYSIS (HCC): ICD-10-CM

## 2020-01-25 DIAGNOSIS — Z99.2 STAGE 5 CHRONIC KIDNEY DISEASE ON CHRONIC DIALYSIS (HCC): ICD-10-CM

## 2020-01-25 DIAGNOSIS — R07.9 CHEST PAIN, UNSPECIFIED TYPE: ICD-10-CM

## 2020-01-25 LAB
ALBUMIN SERPL BCP-MCNC: 3.9 G/DL (ref 3.2–4.9)
ALBUMIN/GLOB SERPL: 0.7 G/DL
ALP SERPL-CCNC: 59 U/L (ref 30–99)
ALT SERPL-CCNC: 15 U/L (ref 2–50)
ANION GAP SERPL CALC-SCNC: 15 MMOL/L (ref 0–11.9)
AST SERPL-CCNC: 34 U/L (ref 12–45)
BASOPHILS # BLD AUTO: 0.8 % (ref 0–1.8)
BASOPHILS # BLD: 0.04 K/UL (ref 0–0.12)
BILIRUB SERPL-MCNC: 0.4 MG/DL (ref 0.1–1.5)
BUN SERPL-MCNC: 38 MG/DL (ref 8–22)
CALCIUM SERPL-MCNC: 9.5 MG/DL (ref 8.4–10.2)
CHLORIDE SERPL-SCNC: 93 MMOL/L (ref 96–112)
CO2 SERPL-SCNC: 26 MMOL/L (ref 20–33)
CREAT SERPL-MCNC: 7.97 MG/DL (ref 0.5–1.4)
EKG IMPRESSION: NORMAL
EOSINOPHIL # BLD AUTO: 0.02 K/UL (ref 0–0.51)
EOSINOPHIL NFR BLD: 0.4 % (ref 0–6.9)
ERYTHROCYTE [DISTWIDTH] IN BLOOD BY AUTOMATED COUNT: 56.3 FL (ref 35.9–50)
GLOBULIN SER CALC-MCNC: 5.3 G/DL (ref 1.9–3.5)
GLUCOSE SERPL-MCNC: 83 MG/DL (ref 65–99)
HCT VFR BLD AUTO: 30 % (ref 37–47)
HGB BLD-MCNC: 9.2 G/DL (ref 12–16)
IMM GRANULOCYTES # BLD AUTO: 0.02 K/UL (ref 0–0.11)
IMM GRANULOCYTES NFR BLD AUTO: 0.4 % (ref 0–0.9)
LIPASE SERPL-CCNC: 47 U/L (ref 7–58)
LYMPHOCYTES # BLD AUTO: 0.89 K/UL (ref 1–4.8)
LYMPHOCYTES NFR BLD: 18.3 % (ref 22–41)
MAGNESIUM SERPL-MCNC: 1.8 MG/DL (ref 1.5–2.5)
MCH RBC QN AUTO: 28.5 PG (ref 27–33)
MCHC RBC AUTO-ENTMCNC: 30.7 G/DL (ref 33.6–35)
MCV RBC AUTO: 92.9 FL (ref 81.4–97.8)
MONOCYTES # BLD AUTO: 0.39 K/UL (ref 0–0.85)
MONOCYTES NFR BLD AUTO: 8 % (ref 0–13.4)
NEUTROPHILS # BLD AUTO: 3.51 K/UL (ref 2–7.15)
NEUTROPHILS NFR BLD: 72.1 % (ref 44–72)
NRBC # BLD AUTO: 0 K/UL
NRBC BLD-RTO: 0 /100 WBC
NT-PROBNP SERPL IA-MCNC: ABNORMAL PG/ML (ref 0–125)
PLATELET # BLD AUTO: 122 K/UL (ref 164–446)
PMV BLD AUTO: 10.2 FL (ref 9–12.9)
POTASSIUM SERPL-SCNC: 5.7 MMOL/L (ref 3.6–5.5)
PROT SERPL-MCNC: 9.2 G/DL (ref 6–8.2)
RBC # BLD AUTO: 3.23 M/UL (ref 4.2–5.4)
SODIUM SERPL-SCNC: 134 MMOL/L (ref 135–145)
TROPONIN T SERPL-MCNC: 453 NG/L (ref 6–19)
WBC # BLD AUTO: 4.9 K/UL (ref 4.8–10.8)

## 2020-01-25 PROCEDURE — 83880 ASSAY OF NATRIURETIC PEPTIDE: CPT

## 2020-01-25 PROCEDURE — 36415 COLL VENOUS BLD VENIPUNCTURE: CPT

## 2020-01-25 PROCEDURE — 73030 X-RAY EXAM OF SHOULDER: CPT | Mod: RT

## 2020-01-25 PROCEDURE — 84484 ASSAY OF TROPONIN QUANT: CPT

## 2020-01-25 PROCEDURE — 85025 COMPLETE CBC W/AUTO DIFF WBC: CPT

## 2020-01-25 PROCEDURE — 80053 COMPREHEN METABOLIC PANEL: CPT

## 2020-01-25 PROCEDURE — G0378 HOSPITAL OBSERVATION PER HR: HCPCS

## 2020-01-25 PROCEDURE — 71046 X-RAY EXAM CHEST 2 VIEWS: CPT

## 2020-01-25 PROCEDURE — 99220 PR INITIAL OBSERVATION CARE,LEVL III: CPT | Performed by: INTERNAL MEDICINE

## 2020-01-25 PROCEDURE — 83735 ASSAY OF MAGNESIUM: CPT

## 2020-01-25 PROCEDURE — 96372 THER/PROPH/DIAG INJ SC/IM: CPT | Mod: XU

## 2020-01-25 PROCEDURE — 83690 ASSAY OF LIPASE: CPT

## 2020-01-25 PROCEDURE — 93005 ELECTROCARDIOGRAM TRACING: CPT | Performed by: EMERGENCY MEDICINE

## 2020-01-25 PROCEDURE — 99285 EMERGENCY DEPT VISIT HI MDM: CPT

## 2020-01-25 PROCEDURE — 700111 HCHG RX REV CODE 636 W/ 250 OVERRIDE (IP): Performed by: EMERGENCY MEDICINE

## 2020-01-25 RX ORDER — ACETAMINOPHEN 325 MG/1
650 TABLET ORAL EVERY 6 HOURS PRN
Status: DISCONTINUED | OUTPATIENT
Start: 2020-01-25 | End: 2020-01-28 | Stop reason: HOSPADM

## 2020-01-25 RX ORDER — CLONIDINE HYDROCHLORIDE 0.1 MG/1
0.1 TABLET ORAL EVERY 6 HOURS PRN
Status: DISCONTINUED | OUTPATIENT
Start: 2020-01-25 | End: 2020-01-28 | Stop reason: HOSPADM

## 2020-01-25 RX ORDER — PROMETHAZINE HYDROCHLORIDE 25 MG/1
12.5-25 TABLET ORAL EVERY 4 HOURS PRN
Status: DISCONTINUED | OUTPATIENT
Start: 2020-01-25 | End: 2020-01-28 | Stop reason: HOSPADM

## 2020-01-25 RX ORDER — MYCOPHENOLIC ACID 360 MG/1
360 TABLET, DELAYED RELEASE ORAL EVERY EVENING
Status: DISCONTINUED | OUTPATIENT
Start: 2020-01-26 | End: 2020-01-28 | Stop reason: HOSPADM

## 2020-01-25 RX ORDER — ONDANSETRON 4 MG/1
4 TABLET, ORALLY DISINTEGRATING ORAL ONCE
Status: COMPLETED | OUTPATIENT
Start: 2020-01-25 | End: 2020-01-25

## 2020-01-25 RX ORDER — HEPARIN SODIUM 5000 [USP'U]/ML
5000 INJECTION, SOLUTION INTRAVENOUS; SUBCUTANEOUS EVERY 8 HOURS
Status: DISCONTINUED | OUTPATIENT
Start: 2020-01-26 | End: 2020-01-28 | Stop reason: HOSPADM

## 2020-01-25 RX ORDER — MORPHINE SULFATE 10 MG/ML
5 INJECTION, SOLUTION INTRAMUSCULAR; INTRAVENOUS ONCE
Status: COMPLETED | OUTPATIENT
Start: 2020-01-25 | End: 2020-01-25

## 2020-01-25 RX ORDER — POLYETHYLENE GLYCOL 3350 17 G/17G
1 POWDER, FOR SOLUTION ORAL
Status: DISCONTINUED | OUTPATIENT
Start: 2020-01-25 | End: 2020-01-28 | Stop reason: HOSPADM

## 2020-01-25 RX ORDER — PREDNISONE 10 MG/1
5 TABLET ORAL EVERY EVENING
Status: DISCONTINUED | OUTPATIENT
Start: 2020-01-26 | End: 2020-01-28 | Stop reason: HOSPADM

## 2020-01-25 RX ORDER — AMOXICILLIN 250 MG
2 CAPSULE ORAL 2 TIMES DAILY
Status: DISCONTINUED | OUTPATIENT
Start: 2020-01-26 | End: 2020-01-28 | Stop reason: HOSPADM

## 2020-01-25 RX ORDER — LABETALOL HYDROCHLORIDE 5 MG/ML
10 INJECTION, SOLUTION INTRAVENOUS EVERY 4 HOURS PRN
Status: DISCONTINUED | OUTPATIENT
Start: 2020-01-25 | End: 2020-01-28 | Stop reason: HOSPADM

## 2020-01-25 RX ORDER — PROMETHAZINE HYDROCHLORIDE 25 MG/1
12.5-25 SUPPOSITORY RECTAL EVERY 4 HOURS PRN
Status: DISCONTINUED | OUTPATIENT
Start: 2020-01-25 | End: 2020-01-28 | Stop reason: HOSPADM

## 2020-01-25 RX ORDER — ONDANSETRON 4 MG/1
4 TABLET, ORALLY DISINTEGRATING ORAL EVERY 4 HOURS PRN
Status: DISCONTINUED | OUTPATIENT
Start: 2020-01-25 | End: 2020-01-28 | Stop reason: HOSPADM

## 2020-01-25 RX ORDER — ONDANSETRON 2 MG/ML
4 INJECTION INTRAMUSCULAR; INTRAVENOUS ONCE
Status: DISCONTINUED | OUTPATIENT
Start: 2020-01-25 | End: 2020-01-25

## 2020-01-25 RX ORDER — ATENOLOL 25 MG/1
25 TABLET ORAL
Status: DISCONTINUED | OUTPATIENT
Start: 2020-01-26 | End: 2020-01-28 | Stop reason: HOSPADM

## 2020-01-25 RX ORDER — OMEPRAZOLE 20 MG/1
20 CAPSULE, DELAYED RELEASE ORAL 2 TIMES DAILY
Status: DISCONTINUED | OUTPATIENT
Start: 2020-01-26 | End: 2020-01-28 | Stop reason: HOSPADM

## 2020-01-25 RX ORDER — PROCHLORPERAZINE EDISYLATE 5 MG/ML
5-10 INJECTION INTRAMUSCULAR; INTRAVENOUS EVERY 4 HOURS PRN
Status: DISCONTINUED | OUTPATIENT
Start: 2020-01-25 | End: 2020-01-28 | Stop reason: HOSPADM

## 2020-01-25 RX ORDER — MORPHINE SULFATE 4 MG/ML
4 INJECTION, SOLUTION INTRAMUSCULAR; INTRAVENOUS ONCE
Status: DISCONTINUED | OUTPATIENT
Start: 2020-01-25 | End: 2020-01-25

## 2020-01-25 RX ORDER — HYDROXYCHLOROQUINE SULFATE 200 MG/1
200 TABLET, FILM COATED ORAL EVERY EVENING
Status: DISCONTINUED | OUTPATIENT
Start: 2020-01-26 | End: 2020-01-28 | Stop reason: HOSPADM

## 2020-01-25 RX ORDER — ONDANSETRON 2 MG/ML
4 INJECTION INTRAMUSCULAR; INTRAVENOUS EVERY 4 HOURS PRN
Status: DISCONTINUED | OUTPATIENT
Start: 2020-01-25 | End: 2020-01-28 | Stop reason: HOSPADM

## 2020-01-25 RX ORDER — BISACODYL 10 MG
10 SUPPOSITORY, RECTAL RECTAL
Status: DISCONTINUED | OUTPATIENT
Start: 2020-01-25 | End: 2020-01-28 | Stop reason: HOSPADM

## 2020-01-25 RX ADMIN — ONDANSETRON 4 MG: 4 TABLET, ORALLY DISINTEGRATING ORAL at 22:57

## 2020-01-25 RX ADMIN — MORPHINE SULFATE 5 MG: 10 INJECTION INTRAVENOUS at 22:56

## 2020-01-26 ENCOUNTER — APPOINTMENT (OUTPATIENT)
Dept: CARDIOLOGY | Facility: MEDICAL CENTER | Age: 25
End: 2020-01-26
Attending: INTERNAL MEDICINE
Payer: COMMERCIAL

## 2020-01-26 PROBLEM — R79.89 ELEVATED TROPONIN: Status: ACTIVE | Noted: 2020-01-26

## 2020-01-26 PROBLEM — R79.89 ELEVATED BRAIN NATRIURETIC PEPTIDE (BNP) LEVEL: Status: ACTIVE | Noted: 2020-01-26

## 2020-01-26 PROBLEM — Z99.2 ESRD (END STAGE RENAL DISEASE) ON DIALYSIS (HCC): Status: ACTIVE | Noted: 2017-10-04

## 2020-01-26 PROBLEM — E87.1 HYPONATREMIA: Status: ACTIVE | Noted: 2020-01-26

## 2020-01-26 PROBLEM — D64.9 NORMOCYTIC ANEMIA: Status: ACTIVE | Noted: 2020-01-26

## 2020-01-26 PROBLEM — E87.5 HYPERKALEMIA: Status: ACTIVE | Noted: 2020-01-26

## 2020-01-26 LAB
ANION GAP SERPL CALC-SCNC: 14 MMOL/L (ref 0–11.9)
ANION GAP SERPL CALC-SCNC: 17 MMOL/L (ref 0–11.9)
BUN SERPL-MCNC: 44 MG/DL (ref 8–22)
BUN SERPL-MCNC: 48 MG/DL (ref 8–22)
CALCIUM SERPL-MCNC: 8.9 MG/DL (ref 8.4–10.2)
CALCIUM SERPL-MCNC: 9.2 MG/DL (ref 8.4–10.2)
CHLORIDE SERPL-SCNC: 93 MMOL/L (ref 96–112)
CHLORIDE SERPL-SCNC: 94 MMOL/L (ref 96–112)
CO2 SERPL-SCNC: 22 MMOL/L (ref 20–33)
CO2 SERPL-SCNC: 24 MMOL/L (ref 20–33)
CREAT SERPL-MCNC: 8.67 MG/DL (ref 0.5–1.4)
CREAT SERPL-MCNC: 8.97 MG/DL (ref 0.5–1.4)
GLUCOSE BLD-MCNC: 143 MG/DL (ref 65–99)
GLUCOSE BLD-MCNC: 85 MG/DL (ref 65–99)
GLUCOSE BLD-MCNC: 92 MG/DL (ref 65–99)
GLUCOSE SERPL-MCNC: 103 MG/DL (ref 65–99)
GLUCOSE SERPL-MCNC: 59 MG/DL (ref 65–99)
LV EJECT FRACT  99904: 60
LV EJECT FRACT MOD 2C 99903: 61.16
LV EJECT FRACT MOD 4C 99902: 58.58
LV EJECT FRACT MOD BP 99901: 61.35
POTASSIUM SERPL-SCNC: 5 MMOL/L (ref 3.6–5.5)
POTASSIUM SERPL-SCNC: 6 MMOL/L (ref 3.6–5.5)
SODIUM SERPL-SCNC: 132 MMOL/L (ref 135–145)
SODIUM SERPL-SCNC: 132 MMOL/L (ref 135–145)
TROPONIN T SERPL-MCNC: 391 NG/L (ref 6–19)
TROPONIN T SERPL-MCNC: 396 NG/L (ref 6–19)

## 2020-01-26 PROCEDURE — 700102 HCHG RX REV CODE 250 W/ 637 OVERRIDE(OP): Performed by: INTERNAL MEDICINE

## 2020-01-26 PROCEDURE — 700101 HCHG RX REV CODE 250: Performed by: INTERNAL MEDICINE

## 2020-01-26 PROCEDURE — 700111 HCHG RX REV CODE 636 W/ 250 OVERRIDE (IP): Performed by: INTERNAL MEDICINE

## 2020-01-26 PROCEDURE — 82962 GLUCOSE BLOOD TEST: CPT | Mod: 91

## 2020-01-26 PROCEDURE — 700105 HCHG RX REV CODE 258: Performed by: INTERNAL MEDICINE

## 2020-01-26 PROCEDURE — 96375 TX/PRO/DX INJ NEW DRUG ADDON: CPT | Mod: XU

## 2020-01-26 PROCEDURE — 93306 TTE W/DOPPLER COMPLETE: CPT | Mod: 26 | Performed by: INTERNAL MEDICINE

## 2020-01-26 PROCEDURE — 99225 PR SUBSEQUENT OBSERVATION CARE,LEVEL II: CPT | Performed by: INTERNAL MEDICINE

## 2020-01-26 PROCEDURE — 700111 HCHG RX REV CODE 636 W/ 250 OVERRIDE (IP)

## 2020-01-26 PROCEDURE — A9270 NON-COVERED ITEM OR SERVICE: HCPCS | Performed by: INTERNAL MEDICINE

## 2020-01-26 PROCEDURE — 700102 HCHG RX REV CODE 250 W/ 637 OVERRIDE(OP)

## 2020-01-26 PROCEDURE — 80048 BASIC METABOLIC PNL TOTAL CA: CPT | Mod: 91

## 2020-01-26 PROCEDURE — 93306 TTE W/DOPPLER COMPLETE: CPT

## 2020-01-26 PROCEDURE — 96376 TX/PRO/DX INJ SAME DRUG ADON: CPT

## 2020-01-26 PROCEDURE — G0378 HOSPITAL OBSERVATION PER HR: HCPCS

## 2020-01-26 PROCEDURE — 84484 ASSAY OF TROPONIN QUANT: CPT

## 2020-01-26 PROCEDURE — 96374 THER/PROPH/DIAG INJ IV PUSH: CPT

## 2020-01-26 RX ORDER — OXYCODONE HYDROCHLORIDE 5 MG/1
5 TABLET ORAL EVERY 4 HOURS PRN
Status: DISCONTINUED | OUTPATIENT
Start: 2020-01-26 | End: 2020-01-28 | Stop reason: HOSPADM

## 2020-01-26 RX ORDER — CALCIUM GLUCONATE 94 MG/ML
INJECTION, SOLUTION INTRAVENOUS
Status: COMPLETED
Start: 2020-01-26 | End: 2020-01-26

## 2020-01-26 RX ORDER — SODIUM POLYSTYRENE SULFONATE 15 G/60ML
30 SUSPENSION ORAL; RECTAL ONCE
Status: DISPENSED | OUTPATIENT
Start: 2020-01-26 | End: 2020-01-27

## 2020-01-26 RX ORDER — ATENOLOL 25 MG/1
25 TABLET ORAL EVERY EVENING
COMMUNITY
End: 2020-05-06

## 2020-01-26 RX ORDER — HYDROXYCHLOROQUINE SULFATE 200 MG/1
200 TABLET, FILM COATED ORAL EVERY MORNING
COMMUNITY

## 2020-01-26 RX ORDER — ACETAMINOPHEN 500 MG
500-1000 TABLET ORAL EVERY 6 HOURS PRN
COMMUNITY
End: 2020-05-29

## 2020-01-26 RX ADMIN — OMEPRAZOLE 20 MG: 20 CAPSULE, DELAYED RELEASE ORAL at 12:03

## 2020-01-26 RX ADMIN — OMEPRAZOLE 20 MG: 20 CAPSULE, DELAYED RELEASE ORAL at 23:33

## 2020-01-26 RX ADMIN — OXYCODONE HYDROCHLORIDE 5 MG: 5 TABLET ORAL at 01:49

## 2020-01-26 RX ADMIN — CALCIUM GLUCONATE 1000 MG: 98 INJECTION, SOLUTION INTRAVENOUS at 20:15

## 2020-01-26 RX ADMIN — OXYCODONE HYDROCHLORIDE 5 MG: 5 TABLET ORAL at 20:11

## 2020-01-26 RX ADMIN — ONDANSETRON 4 MG: 2 INJECTION INTRAMUSCULAR; INTRAVENOUS at 20:02

## 2020-01-26 RX ADMIN — MYCOPHENILIC ACID 360 MG: 360 TABLET, DELAYED RELEASE ORAL at 18:02

## 2020-01-26 RX ADMIN — HYDROXYCHLOROQUINE SULFATE 200 MG: 200 TABLET, FILM COATED ORAL at 18:02

## 2020-01-26 RX ADMIN — INSULIN HUMAN 10 UNITS: 100 INJECTION, SOLUTION PARENTERAL at 02:01

## 2020-01-26 RX ADMIN — DEXTROSE MONOHYDRATE 250 ML: 100 INJECTION, SOLUTION INTRAVENOUS at 01:41

## 2020-01-26 RX ADMIN — ONDANSETRON 4 MG: 2 INJECTION INTRAMUSCULAR; INTRAVENOUS at 12:03

## 2020-01-26 RX ADMIN — CALCIUM GLUCONATE 1 G: 98 INJECTION, SOLUTION INTRAVENOUS at 02:05

## 2020-01-26 RX ADMIN — DEXTROSE MONOHYDRATE 250 ML: 100 INJECTION, SOLUTION INTRAVENOUS at 18:41

## 2020-01-26 RX ADMIN — PREDNISONE 5 MG: 10 TABLET ORAL at 18:02

## 2020-01-26 RX ADMIN — ATENOLOL 25 MG: 25 TABLET ORAL at 05:04

## 2020-01-26 RX ADMIN — DEXTROSE MONOHYDRATE 250 ML: 100 INJECTION, SOLUTION INTRAVENOUS at 19:45

## 2020-01-26 ASSESSMENT — LIFESTYLE VARIABLES
ON A TYPICAL DAY WHEN YOU DRINK ALCOHOL HOW MANY DRINKS DO YOU HAVE: 0
EVER FELT BAD OR GUILTY ABOUT YOUR DRINKING: NO
CONSUMPTION TOTAL: NEGATIVE
TOTAL SCORE: 0
HOW MANY TIMES IN THE PAST YEAR HAVE YOU HAD 5 OR MORE DRINKS IN A DAY: 0
HAVE PEOPLE ANNOYED YOU BY CRITICIZING YOUR DRINKING: NO
EVER_SMOKED: NEVER
TOTAL SCORE: 0
TOTAL SCORE: 0
EVER HAD A DRINK FIRST THING IN THE MORNING TO STEADY YOUR NERVES TO GET RID OF A HANGOVER: NO
AVERAGE NUMBER OF DAYS PER WEEK YOU HAVE A DRINK CONTAINING ALCOHOL: 0
ALCOHOL_USE: NO
HAVE YOU EVER FELT YOU SHOULD CUT DOWN ON YOUR DRINKING: NO

## 2020-01-26 ASSESSMENT — ENCOUNTER SYMPTOMS
FALLS: 0
DIZZINESS: 0
DIARRHEA: 0
SPUTUM PRODUCTION: 0
WEAKNESS: 0
SHORTNESS OF BREATH: 1
NAUSEA: 0
STRIDOR: 0
CHILLS: 0
HEADACHES: 0
LOSS OF CONSCIOUSNESS: 0
ORTHOPNEA: 1
COUGH: 0
TINGLING: 0
DEPRESSION: 0
FEVER: 0
MYALGIAS: 0
PALPITATIONS: 0
ABDOMINAL PAIN: 0
CONSTIPATION: 0
VOMITING: 0

## 2020-01-26 ASSESSMENT — COGNITIVE AND FUNCTIONAL STATUS - GENERAL
MOBILITY SCORE: 24
SUGGESTED CMS G CODE MODIFIER MOBILITY: CH
DAILY ACTIVITIY SCORE: 24
SUGGESTED CMS G CODE MODIFIER DAILY ACTIVITY: CH

## 2020-01-26 ASSESSMENT — PATIENT HEALTH QUESTIONNAIRE - PHQ9
2. FEELING DOWN, DEPRESSED, IRRITABLE, OR HOPELESS: NOT AT ALL
SUM OF ALL RESPONSES TO PHQ9 QUESTIONS 1 AND 2: 0
1. LITTLE INTEREST OR PLEASURE IN DOING THINGS: NOT AT ALL

## 2020-01-26 NOTE — PROGRESS NOTES
Hospital Medicine Daily Progress Note    Date of Service  1/26/2020    Chief Complaint  24 y.o. female admitted 1/25/2020 with R sided chest pain for 3 days    Hospital Course    chest pain has resolved, no sob/cough/f/c; trops quite elevated however without EKG changes      Interval Problem Update  Feels fine now; no more chest pain; no new c/o    Consultants/Specialty  Cardiology/nephrology    Code Status  full    Disposition  Home when stable    Review of Systems  Review of Systems   All other systems reviewed and are negative.       Physical Exam  Temp:  [36.8 °C (98.2 °F)-36.9 °C (98.4 °F)] 36.8 °C (98.2 °F)  Pulse:  [] 102  Resp:  [18] 18  BP: (109-155)/() 132/88  SpO2:  [92 %-100 %] 92 %    Physical Exam  Constitutional:       Appearance: Normal appearance.   HENT:      Head: Normocephalic.      Nose: Nose normal.      Mouth/Throat:      Mouth: Mucous membranes are moist.   Eyes:      Extraocular Movements: Extraocular movements intact.   Neck:      Musculoskeletal: Normal range of motion and neck supple.   Cardiovascular:      Rate and Rhythm: Normal rate and regular rhythm.      Pulses: Normal pulses.      Heart sounds: Normal heart sounds.   Pulmonary:      Effort: Pulmonary effort is normal.      Breath sounds: Normal breath sounds.   Abdominal:      General: Abdomen is flat. Bowel sounds are normal.      Tenderness: There is no tenderness.   Musculoskeletal:      Right lower leg: No edema.      Left lower leg: No edema.   Skin:     General: Skin is warm.   Neurological:      General: No focal deficit present.      Mental Status: She is alert.   Psychiatric:         Behavior: Behavior normal.         Fluids  No intake or output data in the 24 hours ending 01/26/20 1513    Laboratory  Recent Labs     01/25/20  2241   WBC 4.9   RBC 3.23*   HEMOGLOBIN 9.2*   HEMATOCRIT 30.0*   MCV 92.9   MCH 28.5   MCHC 30.7*   RDW 56.3*   PLATELETCT 122*   MPV 10.2     Recent Labs     01/25/20  2241   SODIUM  134*   POTASSIUM 5.7*   CHLORIDE 93*   CO2 26   GLUCOSE 83   BUN 38*   CREATININE 7.97*   CALCIUM 9.5                   Imaging  EC-ECHOCARDIOGRAM COMPLETE W/O CONT         DX-CHEST-2 VIEWS   Final Result         1. No active cardiopulmonary abnormalities are identified.      DX-SHOULDER 2+ RIGHT   Final Result         1. No acute osseous abnormality.           Assessment/Plan  Elevated troponin- (present on admission)  Assessment & Plan  -Significant elevation however she is a hemodialysis patient  -Continue to trend troponin, decreasing; patient without chest pain/EKG changes today; will discuss with cardiology  -Associated with right-sided chest pain which she states she had similar pain with pericardial effusion and pleural effusion; pain has resolved here  -Obtain echocardiogram  -Monitor on telemetry    ESRD (end stage renal disease) on dialysis (HCC)- (present on admission)  Assessment & Plan  -Patient did have hemodialysis yesterday on her normal hemodialysis day, no abnormalities  -While she does have hyperkalemia, this is mild, no need for urgent hemodialysis at this time  Nephrology consult for continued HD    Hyperkalemia- (present on admission)  Assessment & Plan  S/p treatment yesterday; f/u labs  Holding ARB    Hyponatremia- (present on admission)  Assessment & Plan  Mild; -Patient does not appear significantly fluid overloaded, certainly not dehydrated    -Repeat BMP in the morning    Normocytic anemia- (present on admission)  Assessment & Plan  -No sign of gross bleeding  -Repeat CBC in the morning    Elevated brain natriuretic peptide (BNP) level- (present on admission)  Assessment & Plan  -Inpatient with end-stage renal disease  -I did personally review her chest x-ray, noted no fluid overload, noted no acute cardiopulmonary process  -No significant lower extremity edema either    Lupus (HCC)- (present on admission)  Assessment & Plan  -Chronic, continue home meds  -No acute flare    Hypertension-  (present on admission)  Assessment & Plan  -Hold home losartan due to hyperkalemia  -Continue home atenolol  -Start PRN clonidine and labetalol  -Adjust as needed         VTE prophylaxis: heparin

## 2020-01-26 NOTE — ASSESSMENT & PLAN NOTE
K 7.4 this am, patient asymptomatic and no dysrhythmia; calcium gluconate/IV insulin/glucose given this am  Stat dialysis being done now, nephro consulted  Holding ARB

## 2020-01-26 NOTE — PROGRESS NOTES
Telemetry Shift Summary    Rhythm SR  HR Range 80-88  Ectopy none  Measurements 0.18/0.08/0.36        Normal Values  Rhythm SR  HR Range    Measurements 0.12-0.20 / 0.06-0.10  / 0.30-0.52

## 2020-01-26 NOTE — PROGRESS NOTES
Bedside report given to Patrice MARTINEZ. POC discussed. Pt resting comfortably in bed. Safety precautions in place.

## 2020-01-26 NOTE — H&P
Hospital Medicine History & Physical Note    Date of Service  1/25/2020    Primary Care Physician  Ella Matthew M.D.    Consultants  None    Code Status  Full    Chief Complaint  Chest pain    History of Presenting Illness  24 y.o. female who presented 1/25/2020 with chest pain and orthopnea as well as right shoulder pain.  She states her symptoms started with right shoulder pain which was a week ago, its constant.  She then developed chest pain and orthopnea 3 days ago.  She states the chest pain is on the right, pressure-like sensation, 9/10 at its worse.  She states it is not constant yet it still feels the same as the right shoulder pain.  She denies any fever, chills or cough.  She does have a history of lupus as well as end-stage renal disease, gets hemodialysis Monday Wednesday Friday, had a normal hemodialysis session on Friday.  She states she has had similar pain for years ago whenever she had fluid on her lungs and heart.  I did discuss the case including labs and imaging with the ER physician.    Review of Systems  Review of Systems   Constitutional: Negative for chills, fever and malaise/fatigue.   HENT: Negative for congestion.    Respiratory: Positive for shortness of breath. Negative for cough, sputum production and stridor.    Cardiovascular: Positive for chest pain and orthopnea. Negative for palpitations and leg swelling.   Gastrointestinal: Negative for abdominal pain, constipation, diarrhea, nausea and vomiting.   Genitourinary: Negative for dysuria and urgency.   Musculoskeletal: Positive for joint pain (right shoulder). Negative for falls and myalgias.   Neurological: Negative for dizziness, tingling, loss of consciousness, weakness and headaches.   Psychiatric/Behavioral: Negative for depression and suicidal ideas.   All other systems reviewed and are negative.      Past Medical History   has a past medical history of Anemia (01/17/2018), AVF (arteriovenous fistula) ([facility]), Dialysis  patient ([facility]), ESRD (end stage renal disease) on dialysis ([facility]) (01/17/2018), Heart burn, Hypertension (01/17/2018), Indigestion, Lupus ([facility]), Migraines (01/17/2018), and Seizure ([facility]) (2013).    Surgical History   has a past surgical history that includes ronak by laparoscopy (4/5/2010); av fistula creation (Right); angioplasty (01/17/2018); other; other; and gastroscopy-endo (12/9/2019).     Family History  Diabetes mellitus    Social History   reports that she has never smoked. She has never used smokeless tobacco. She reports that she does not drink alcohol or use drugs.    Allergies  Allergies   Allergen Reactions   • Clindamycin Rash     Hive   • Keflex Rash     Hives   • Metoprolol Nausea       Medications  Prior to Admission Medications   Prescriptions Last Dose Informant Patient Reported? Taking?   atenolol (TENORMIN) 25 MG Tab 1/25/2020 at Unknown time Patient No No   Sig: TAKE ONE TABLET BY MOUTH ONCE DAILY   hydroxychloroquine (PLAQUENIL) 200 MG Tab 1/25/2020 at Unknown time Patient No No   Sig: TAKE ONE TABLET BY MOUTH AT BEDTIME   losartan (COZAAR) 100 MG Tab 1/25/2020 at Unknown time Patient Yes No   Sig: Take 100 mg by mouth every day.   mycophenolate sodium (MYFORTIC) 360 MG Tablet Delayed Response tablet 1/25/2020 at Unknown time Patient Yes No   Sig: Take 360 mg by mouth every evening.   omeprazole (PRILOSEC OTC) 20 MG tablet 1/25/2020 at Unknown time Patient No No   Sig: Take 1 Tab by mouth 2 times a day. Indications: Heartburn   predniSONE (DELTASONE) 5 MG Tab 1/25/2020 at Unknown time Patient Yes No   Sig: Take 5 mg by mouth every evening.      Facility-Administered Medications: None       Physical Exam  Temp:  [36.8 °C (98.3 °F)] 36.8 °C (98.3 °F)  Pulse:  [84-89] 84  Resp:  [18] 18  BP: (144-154)/(106-116) 144/106  SpO2:  [53 %-98 %] 53 %    Physical Exam  Vitals signs and nursing note reviewed.   Constitutional:       General: She is not in acute distress.     Appearance: She is  well-developed. She is not diaphoretic.   HENT:      Head: Normocephalic and atraumatic.      Right Ear: External ear normal.      Left Ear: External ear normal.      Nose: Nose normal. No congestion or rhinorrhea.      Mouth/Throat:      Mouth: Mucous membranes are moist.      Pharynx: No oropharyngeal exudate.   Eyes:      General: No scleral icterus.        Right eye: No discharge.         Left eye: No discharge.      Extraocular Movements: Extraocular movements intact.   Neck:      Musculoskeletal: Normal range of motion and neck supple.      Trachea: No tracheal deviation.   Cardiovascular:      Rate and Rhythm: Normal rate and regular rhythm.      Heart sounds: No murmur. No friction rub. No gallop.    Pulmonary:      Effort: Pulmonary effort is normal. No respiratory distress.      Breath sounds: Normal breath sounds. No stridor. No wheezing or rales.   Chest:      Chest wall: No tenderness.   Abdominal:      General: Bowel sounds are normal. There is no distension.      Palpations: Abdomen is soft.      Tenderness: There is no tenderness.   Musculoskeletal: Normal range of motion.         General: No tenderness.      Right lower leg: No edema.      Left lower leg: No edema.   Lymphadenopathy:      Cervical: No cervical adenopathy.   Skin:     General: Skin is warm and dry.      Coloration: Skin is not jaundiced.      Findings: No erythema or rash.   Neurological:      General: No focal deficit present.      Mental Status: She is alert and oriented to person, place, and time.      Cranial Nerves: No cranial nerve deficit.   Psychiatric:         Mood and Affect: Mood normal.         Behavior: Behavior normal.         Thought Content: Thought content normal.         Judgment: Judgment normal.         Laboratory:  Recent Labs     01/25/20 2241   WBC 4.9   RBC 3.23*   HEMOGLOBIN 9.2*   HEMATOCRIT 30.0*   MCV 92.9   MCH 28.5   MCHC 30.7*   RDW 56.3*   PLATELETCT 122*   MPV 10.2     Recent Labs     01/25/20 2241    SODIUM 134*   POTASSIUM 5.7*   CHLORIDE 93*   CO2 26   GLUCOSE 83   BUN 38*   CREATININE 7.97*   CALCIUM 9.5     Recent Labs     01/25/20  2241   ALTSGPT 15   ASTSGOT 34   ALKPHOSPHAT 59   TBILIRUBIN 0.4   LIPASE 47   GLUCOSE 83         Recent Labs     01/25/20  2241   NTPROBNP [id number]*         Recent Labs     01/25/20  2241   TROPONINT 453*       Urinalysis:    No results found     Imaging:  DX-CHEST-2 VIEWS   Final Result         1. No active cardiopulmonary abnormalities are identified.      DX-SHOULDER 2+ RIGHT   Final Result         1. No acute osseous abnormality.      EC-ECHOCARDIOGRAM LTD W/O CONT    (Results Pending)         Assessment/Plan:  I anticipate this patient is appropriate for observation status at this time.    Elevated troponin- (present on admission)  Assessment & Plan  -Significant elevation however she is a hemodialysis patient  -Continue to trend troponin  -She does not have a recent troponin to compare what her baseline may be  -Associated with right-sided chest pain which she states she had similar pain with pericardial effusion and pleural effusion  -Obtain echocardiogram  -Monitor on telemetry    Hyperkalemia- (present on admission)  Assessment & Plan  -Mild however I will treat with IV calcium as well as IV insulin  -Repeat BMP couple hours after treatment    Hyponatremia- (present on admission)  Assessment & Plan  -Patient does not appear significantly fluid overloaded, certainly not dehydrated  -Does have a markedly elevated BNP however  -Repeat BMP in the morning    Normocytic anemia- (present on admission)  Assessment & Plan  -No sign of gross bleeding  -Repeat CBC in the morning    Elevated brain natriuretic peptide (BNP) level- (present on admission)  Assessment & Plan  -Inpatient with end-stage renal disease  -I did personally review her chest x-ray, noted no fluid overload, noted no acute cardiopulmonary process  -No significant lower extremity edema either    Lupus (HCC)-  (present on admission)  Assessment & Plan  -Chronic, continue home meds  -No acute flare    Hypertension- (present on admission)  Assessment & Plan  -Hold home losartan due to hyperkalemia  -Continue home atenolol  -Start PRN clonidine and labetalol  -Adjust as needed    ESRD (end stage renal disease) on dialysis (HCC)- (present on admission)  Assessment & Plan  -Patient did have hemodialysis yesterday on her normal hemodialysis day, no abnormalities  -While she does have hyperkalemia, this is mild, no need for urgent hemodialysis at this time  -May need nephrology consultation if she is still here on Monday however at this point in time she does not need urgent hemodialysis      VTE prophylaxis: Heparin

## 2020-01-26 NOTE — PROGRESS NOTES
Pt resting in bed with mom at bedside. Lunch on tray table  Pt stated she is not hungry right now. No needs at this time.

## 2020-01-26 NOTE — PROGRESS NOTES
All medications given as ordered, patient left to rest, safety precautions in place. No other needs at this time.

## 2020-01-26 NOTE — PROGRESS NOTES
2 RN skin check complete.   Devices in place tele.  Skin assessed under devices intact.  Confirmed pressure ulcers found on n/a.  New potential pressure ulcers noted on n/a. Wound consult placed n/a.  The following interventions in place patient turns self side to side, pillows in use for support, positioning.     Skin WDL, c/d/i

## 2020-01-26 NOTE — ED PROVIDER NOTES
ED Provider Note    ED Provider Note    Scribed for Allison Cormier MD by Allison Cormier M.D.. 1/25/2020, 10:12 PM.    Primary care provider: Ella Matthew M.D.  Means of arrival: Private  History obtained from: Patient and family  History limited by: None    CHIEF COMPLAINT  Chief Complaint   Patient presents with   • Shortness of Breath     started about 3 days ago  had same symptoms about 3-4 years ago    • Shoulder Pain     for the last week now  denies injury        HPI  Lily Nicole is a 24 y.o. female who presents to the Emergency Department for evaluation of dyspnea and pain to the right shoulder.  Patient relates about 1 week ago she had some mild pain has been getting somewhat worse to both the anterior and posterior right shoulder.  No trauma, pain is localized the shoulder without radiation to the neck or down the arm.  She relates her in 3 days ago she has had a sensation of orthopnea, describing dyspnea when she is laying flat.  She notes no exertional component but does describe mild pleuritic chest discomfort to the center of the chest when she is exerting herself.  She has an impressive medical history including end-stage renal disease requiring dialysis Monday Wednesday and Friday.  Patient thankfully does not make her own urine.  She has a history of lupus as well.  No fever, no productive cough.  She notes no lower extremity edema but her mother noted some mild swelling to the patient's right hand that resolved about 2 days ago.  No history of MI, her nephrologist is Dr. Trent.    REVIEW OF SYSTEMS  Pertinent positives include atraumatic pain to the anterior and posterior right shoulder, orthopnea, pleuritic chest discomfort. Pertinent negatives include no fever, no lower extremity edema, no missed dialysis.  All other systems reviewed and negative.    PAST MEDICAL HISTORY   has a past medical history of Anemia (01/17/2018), AVF (arteriovenous fistula) ([facility]), Dialysis  "patient ([facility]), ESRD (end stage renal disease) on dialysis ([facility]) (01/17/2018), Heart burn, Hypertension (01/17/2018), Indigestion, Lupus ([facility]), Migraines (01/17/2018), and Seizure ([facility]) (2013).    SURGICAL HISTORY   has a past surgical history that includes ronak by laparoscopy (4/5/2010); av fistula creation (Right); angioplasty (01/17/2018); other; other; and gastroscopy-endo (12/9/2019).    SOCIAL HISTORY  Social History     Tobacco Use   • Smoking status: Never Smoker   • Smokeless tobacco: Never Used   Substance Use Topics   • Alcohol use: No   • Drug use: No      Social History     Substance and Sexual Activity   Drug Use No       FAMILY HISTORY  No family history on file.    CURRENT MEDICATIONS  Home Medications     Reviewed by Tara Tai R.N. (Registered Nurse) on 01/25/20 at 2134  Med List Status: Partial   Medication Last Dose Status   atenolol (TENORMIN) 25 MG Tab 1/25/2020 Active   hydroxychloroquine (PLAQUENIL) 200 MG Tab 1/25/2020 Active   losartan (COZAAR) 100 MG Tab 1/25/2020 Active   mycophenolate sodium (MYFORTIC) 360 MG Tablet Delayed Response tablet 1/25/2020 Active   omeprazole (PRILOSEC OTC) 20 MG tablet 1/25/2020 Active   predniSONE (DELTASONE) 5 MG Tab 1/25/2020 Active                ALLERGIES  Allergies   Allergen Reactions   • Clindamycin Rash     Hive   • Keflex Rash     Hives   • Metoprolol Nausea       PHYSICAL EXAM  VITAL SIGNS: /106   Pulse 84   Temp 36.8 °C (98.3 °F) (Temporal)   Resp 18   Ht 1.702 m (5' 7\")   Wt 65.8 kg (145 lb 1 oz)   LMP 01/06/2020 (Approximate)   SpO2 (!) 53%   BMI 22.72 kg/m²     General: Alert, no acute distress  Skin: Warm, dry, normal for ethnicity  Head: Normocephalic, atraumatic  Neck: Trachea midline, no tenderness, no significant JVD noted.  Eye: PERRL, normal conjunctiva  ENMT: Oral mucosa moist, no pharyngeal erythema or exudate  Cardiovascular: Regular rate and rhythm, No murmur, Normal peripheral perfusion.  No peripheral " edema  Respiratory: Lungs CTA, respirations are non-labored, breath sounds are equal, no rales, no rhonchi, no stridor.  Gastrointestinal: Soft, nontender, non distended  Musculoskeletal: No swelling, no deformity, no carolann step-off, generalized tenderness, range of motion grossly intact.  Neurological: Alert and oriented to person, place, time, and situation  Lymphatics: No lymphadenopathy  Psychiatric: Cooperative, appropriate mood & affect      DIAGNOSTIC STUDIES/PROCEDURES    LABS  Results for orders placed or performed during the hospital encounter of 01/25/20   CBC w/ Differential   Result Value Ref Range    WBC 4.9 4.8 - 10.8 K/uL    RBC 3.23 (L) 4.20 - 5.40 M/uL    Hemoglobin 9.2 (L) 12.0 - 16.0 g/dL    Hematocrit 30.0 (L) 37.0 - 47.0 %    MCV 92.9 81.4 - 97.8 fL    MCH 28.5 27.0 - 33.0 pg    MCHC 30.7 (L) 33.6 - 35.0 g/dL    RDW 56.3 (H) 35.9 - 50.0 fL    Platelet Count 122 (L) 164 - 446 K/uL    MPV 10.2 9.0 - 12.9 fL    Neutrophils-Polys 72.10 (H) 44.00 - 72.00 %    Lymphocytes 18.30 (L) 22.00 - 41.00 %    Monocytes 8.00 0.00 - 13.40 %    Eosinophils 0.40 0.00 - 6.90 %    Basophils 0.80 0.00 - 1.80 %    Immature Granulocytes 0.40 0.00 - 0.90 %    Nucleated RBC 0.00 /100 WBC    Neutrophils (Absolute) 3.51 2.00 - 7.15 K/uL    Lymphs (Absolute) 0.89 (L) 1.00 - 4.80 K/uL    Monos (Absolute) 0.39 0.00 - 0.85 K/uL    Eos (Absolute) 0.02 0.00 - 0.51 K/uL    Baso (Absolute) 0.04 0.00 - 0.12 K/uL    Immature Granulocytes (abs) 0.02 0.00 - 0.11 K/uL    NRBC (Absolute) 0.00 K/uL   Complete Metabolic Panel (CMP)   Result Value Ref Range    Sodium 134 (L) 135 - 145 mmol/L    Potassium 5.7 (H) 3.6 - 5.5 mmol/L    Chloride 93 (L) 96 - 112 mmol/L    Co2 26 20 - 33 mmol/L    Anion Gap 15.0 (H) 0.0 - 11.9    Glucose 83 65 - 99 mg/dL    Bun 38 (H) 8 - 22 mg/dL    Creatinine 7.97 (HH) 0.50 - 1.40 mg/dL    Calcium 9.5 8.4 - 10.2 mg/dL    AST(SGOT) 34 12 - 45 U/L    ALT(SGPT) 15 2 - 50 U/L    Alkaline Phosphatase 59 30 - 99 U/L     Total Bilirubin 0.4 0.1 - 1.5 mg/dL    Albumin 3.9 3.2 - 4.9 g/dL    Total Protein 9.2 (H) 6.0 - 8.2 g/dL    Globulin 5.3 (H) 1.9 - 3.5 g/dL    A-G Ratio 0.7 g/dL   proBrain Natriuretic Peptide, NT   Result Value Ref Range    NT-proBNP 68207 (H) 0 - 125 pg/mL   Troponin STAT   Result Value Ref Range    Troponin T 453 (H) 6 - 19 ng/L   Lipase   Result Value Ref Range    Lipase 47 7 - 58 U/L   Magnesium   Result Value Ref Range    Magnesium 1.8 1.5 - 2.5 mg/dL   ESTIMATED GFR   Result Value Ref Range    GFR If  8 (A) >60 mL/min/1.73 m 2    GFR If Non African American 6 (A) >60 mL/min/1.73 m 2   EKG   Result Value Ref Range    Report       [facility] Emergency Dept.    Test Date:  2020  Pt Name:    CASE WOODSON            Department: [facility]  MRN:        [id number]                      [facility]       [facility]   Gender:     Female                       Technician: BRIGID  :        1995                   Requested By:ALBERT CORBIN  Order #:    [id number]                    Reading MD: ALBERT CORBIN MD    Measurements  Intervals                                Axis  Rate:       82                           P:          32  NE:         172                          QRS:        2  QRSD:       82                           T:          87  QT:         364  QTc:        425    Interpretive Statements  SINUS RHYTHM  CONSIDER LEFT VENTRICULAR HYPERTROPHY  Compared to ECG 2019 10:02:20  No significant changes  Electronically Signed On 2020 23:29:44 PST by ALBERT CORBIN MD       All labs reviewed by me; concerning troponin elevation, mild hyperkalemia, elevated BNP consistent with chronic kidney disease on dialysis.    EKG  12 Lead EKG obtained at 2240 and interpreted by me to show:  Rhythm: Normal sinus rhythm   Rate: 82  Axis: Left  Intervals: Normal  Q Waves: Normal  No diagnostic ST segment elevation  Criteria for left ventricular hypertrophy  "noted  Clinical Impression: Normal EKG  Compared to December 9, 2019, no significant change.    RADIOLOGY  DX-CHEST-2 VIEWS   Final Result         1. No active cardiopulmonary abnormalities are identified.      DX-SHOULDER 2+ RIGHT   Final Result         1. No acute osseous abnormality.        Bedside cardiac ultrasound performed by myself demonstrates no significant pericardial effusion in both the parasternal long and short views    The radiologist's interpretation of all radiological studies have been reviewed by me.    COURSE & MEDICAL DECISION MAKING  Pertinent Labs & Imaging studies reviewed. (See chart for details)    10:12 PM - Patient seen and examined at bedside. Patient will be treated with morphine and Zofran for pain. Ordered cardiac work-up as well as electrolyte panel to evaluate her symptoms. The differential diagnoses include but are not limited to: Pulmonary edema, pleural effusion, fluid overload    2240: Patient updated with unremarkable imaging including bedside ultrasound performed by myself.  She is in no carolann distress at this time, awaiting labs.    2321: Patient updated with concerning troponin elevation, she is feeling somewhat better, pain improved.  I spoke with hospitalist Dr. Styles who accepts the patient to his service.    Patient Vitals for the past 24 hrs:   BP Temp Temp src Pulse Resp SpO2 Height Weight   01/25/20 2319 144/106 -- -- 84 -- (!) 53 % -- --   01/25/20 2131 154/116 36.8 °C (98.3 °F) Temporal 89 18 98 % -- --   01/25/20 2129 -- -- -- -- -- -- 1.702 m (5' 7\") 65.8 kg (145 lb 1 oz)         Decision Making:  This is a 24 y.o. year old female who presents with 2 complaints, noting atraumatic pain to the anterior and posterior right shoulder as well as sensation of orthopnea and a mild pleuritic chest discomfort.  EKG is unremarkable however troponin is impressively elevated, though not typical presentation of ACS, her HEART score is 4 given her risk factors, moderate risk " stratification. She has no hypoxia, no tachycardia, no tachypnea, no recent surgeries, no recent long travel, no history of established thromboembolic disease; this would not be consistent with clinically significant pulmonary embolism according to PERC criteria.  Patient is neurovascular intact and has unremarkable imaging of the right shoulder as well.  Encourage etiology of her sensation of dyspnea, thankfully there is no evidence of any pleural effusion nor any pericardial effusion on imaging at bedside ultrasound.  Given above, I feel she would benefit from trending of her troponins, spoke with the hospitalist who concurs & accepts the patient to service    Patient is admitted in improved but guarded condition to medical telemetry observation under the care of the hospitalist    FINAL IMPRESSION  1. Chest pain, unspecified type    2. Elevated troponin    3. Stage 5 chronic kidney disease on chronic dialysis (HCC)    4. Orthopnea          Allison PARRA M.D. (Scribe), am scribing for, and in the presence of, Allison Cormier MD.    Electronically signed by: Allison Cormier M.D. (Scribe), 1/25/2020    IAllison MD personally performed the services described in this documentation, as scribed by Allison Cormier M.D. in my presence, and it is both accurate and complete    The note accurately reflects work and decisions made by me.  Allison Cormier M.D.  1/25/2020  11:32 PM

## 2020-01-26 NOTE — ASSESSMENT & PLAN NOTE
-Hold home losartan due to hyperkalemia  -Continue home atenolol  -Start PRN clonidine and labetalol  -Adjust as needed

## 2020-01-26 NOTE — PROGRESS NOTES
Took report from Venus MARTINEZ, pt resting in bed, family at bedside. No needs at this time. Bed in low locked position, call light in place, will continue to monitor.

## 2020-01-26 NOTE — ED TRIAGE NOTES
"Pt comes in w/ family  C/o OSB that started about 3 days ago  Has had symptoms like this before \"It was fluid in my lungs\"   Happened in [place] about 3-4 years ago   Also c/o R should pain that has been on going for the last week  Thought she slept on it wrong however it is not going away   Pt w/ strong medical Hx Lupus and renal failure w/ dialysis   "

## 2020-01-26 NOTE — PROGRESS NOTES
Report received from Venus MARTINEZ. Plan of care discussed. Patient resting comfortably in bed. Safety precautions in place.

## 2020-01-26 NOTE — CARE PLAN
Problem: Communication  Goal: The ability to communicate needs accurately and effectively will improve  Outcome: PROGRESSING AS EXPECTED  Note:   Pt oriented to the call light. POC discussed including scheduled echo, continuous cardiac monitoring, hyperkalemia protocol, pt educated on unfamiliar medications, encouraged to call for assistance       Problem: Safety  Goal: Will remain free from injury  Outcome: PROGRESSING AS EXPECTED  Note:   Pt call light and belongings with in reach, bed in locked and low position, treaded socks on, bed rails up x2

## 2020-01-26 NOTE — PROGRESS NOTES
Pt arrived to unit via gurney. Ambulated from gurney to bed, no assistance required. Tele monitor applied, vitals taken. Pt assessed. A&O x4. Admit profile and med rec complete. Discussed POC with pt, including hyperkalemia protocol, continuous cardiac monitoring, echo scheduled for am. Welcome folder provided and discussed. Communication board filled out. Questions and concerns addressed, verbalized understanding. Fall precautions in place. Pt demonstrates ability to use call light appropriately. Pt left in lowest position. Bed locked and low.

## 2020-01-26 NOTE — CARE PLAN
Problem: Communication  Goal: The ability to communicate needs accurately and effectively will improve  Outcome: PROGRESSING AS EXPECTED  Note:   Encouraged pt to voice concerns and ask questions about plan of care. No needs at this time.      Problem: Safety  Goal: Will remain free from falls  Outcome: PROGRESSING AS EXPECTED  Note:   Pt ambulating hallways with family member. Pt has non slip socks on and steady gait when ambulating.

## 2020-01-26 NOTE — ED NOTES
Patient medicated as ordered. Updated to POC. Family present, call light in reach. Denies needs. Diagnostics pending.

## 2020-01-26 NOTE — ASSESSMENT & PLAN NOTE
-Significant elevation however she is a hemodialysis patient  -Continue to trend troponin, decreasing; patient without chest pain/EKG changes today; discussed with cardiology:  Likely related to decreased clearance from ESRD; will do cardiac stress test  -Associated with right-sided chest pain which she states she had similar pain with pericardial effusion and pleural effusion; pain has resolved here  Echocardiogram noted  -Monitor on telemetry

## 2020-01-26 NOTE — ASSESSMENT & PLAN NOTE
-In patient with end-stage renal disease  -I did personally review her chest x-ray, noted no fluid overload, noted no acute cardiopulmonary process  -No significant lower extremity edema either

## 2020-01-27 LAB
ANION GAP SERPL CALC-SCNC: 15 MMOL/L (ref 0–11.9)
ANION GAP SERPL CALC-SCNC: 16 MMOL/L (ref 0–11.9)
BUN SERPL-MCNC: 55 MG/DL (ref 8–22)
BUN SERPL-MCNC: 57 MG/DL (ref 8–22)
CALCIUM SERPL-MCNC: 8.8 MG/DL (ref 8.4–10.2)
CALCIUM SERPL-MCNC: 9 MG/DL (ref 8.4–10.2)
CHLORIDE SERPL-SCNC: 92 MMOL/L (ref 96–112)
CHLORIDE SERPL-SCNC: 92 MMOL/L (ref 96–112)
CO2 SERPL-SCNC: 21 MMOL/L (ref 20–33)
CO2 SERPL-SCNC: 22 MMOL/L (ref 20–33)
CREAT SERPL-MCNC: 9.77 MG/DL (ref 0.5–1.4)
CREAT SERPL-MCNC: 9.81 MG/DL (ref 0.5–1.4)
ERYTHROCYTE [DISTWIDTH] IN BLOOD BY AUTOMATED COUNT: 55.2 FL (ref 35.9–50)
GLUCOSE SERPL-MCNC: 179 MG/DL (ref 65–99)
GLUCOSE SERPL-MCNC: 94 MG/DL (ref 65–99)
HCT VFR BLD AUTO: 28.2 % (ref 37–47)
HGB BLD-MCNC: 8.5 G/DL (ref 12–16)
MCH RBC QN AUTO: 27.8 PG (ref 27–33)
MCHC RBC AUTO-ENTMCNC: 30.1 G/DL (ref 33.6–35)
MCV RBC AUTO: 92.2 FL (ref 81.4–97.8)
PLATELET # BLD AUTO: 111 K/UL (ref 164–446)
PMV BLD AUTO: 10.1 FL (ref 9–12.9)
POTASSIUM SERPL-SCNC: 5.9 MMOL/L (ref 3.6–5.5)
POTASSIUM SERPL-SCNC: 7.4 MMOL/L (ref 3.6–5.5)
RBC # BLD AUTO: 3.06 M/UL (ref 4.2–5.4)
SODIUM SERPL-SCNC: 129 MMOL/L (ref 135–145)
SODIUM SERPL-SCNC: 129 MMOL/L (ref 135–145)
WBC # BLD AUTO: 4.3 K/UL (ref 4.8–10.8)

## 2020-01-27 PROCEDURE — 700111 HCHG RX REV CODE 636 W/ 250 OVERRIDE (IP): Performed by: INTERNAL MEDICINE

## 2020-01-27 PROCEDURE — 82962 GLUCOSE BLOOD TEST: CPT | Mod: 91

## 2020-01-27 PROCEDURE — 700101 HCHG RX REV CODE 250: Performed by: INTERNAL MEDICINE

## 2020-01-27 PROCEDURE — 96376 TX/PRO/DX INJ SAME DRUG ADON: CPT | Mod: XU

## 2020-01-27 PROCEDURE — 99225 PR SUBSEQUENT OBSERVATION CARE,LEVEL II: CPT | Performed by: INTERNAL MEDICINE

## 2020-01-27 PROCEDURE — 700102 HCHG RX REV CODE 250 W/ 637 OVERRIDE(OP): Performed by: INTERNAL MEDICINE

## 2020-01-27 PROCEDURE — 700105 HCHG RX REV CODE 258: Performed by: INTERNAL MEDICINE

## 2020-01-27 PROCEDURE — 85027 COMPLETE CBC AUTOMATED: CPT

## 2020-01-27 PROCEDURE — 80048 BASIC METABOLIC PNL TOTAL CA: CPT

## 2020-01-27 PROCEDURE — A9270 NON-COVERED ITEM OR SERVICE: HCPCS | Performed by: INTERNAL MEDICINE

## 2020-01-27 PROCEDURE — 96375 TX/PRO/DX INJ NEW DRUG ADDON: CPT | Mod: XU

## 2020-01-27 PROCEDURE — 90935 HEMODIALYSIS ONE EVALUATION: CPT

## 2020-01-27 PROCEDURE — G0378 HOSPITAL OBSERVATION PER HR: HCPCS

## 2020-01-27 PROCEDURE — 99215 OFFICE O/P EST HI 40 MIN: CPT | Performed by: INTERNAL MEDICINE

## 2020-01-27 RX ORDER — HEPARIN SODIUM 1000 [USP'U]/ML
1500 INJECTION, SOLUTION INTRAVENOUS; SUBCUTANEOUS
Status: DISCONTINUED | OUTPATIENT
Start: 2020-01-27 | End: 2020-01-28 | Stop reason: HOSPADM

## 2020-01-27 RX ADMIN — PREDNISONE 5 MG: 10 TABLET ORAL at 17:55

## 2020-01-27 RX ADMIN — CALCIUM GLUCONATE 2000 MG: 98 INJECTION, SOLUTION INTRAVENOUS at 08:14

## 2020-01-27 RX ADMIN — ONDANSETRON 4 MG: 2 INJECTION INTRAMUSCULAR; INTRAVENOUS at 17:48

## 2020-01-27 RX ADMIN — HYDROXYCHLOROQUINE SULFATE 200 MG: 200 TABLET, FILM COATED ORAL at 17:55

## 2020-01-27 RX ADMIN — ACETAMINOPHEN 650 MG: 325 TABLET, FILM COATED ORAL at 16:12

## 2020-01-27 RX ADMIN — ONDANSETRON 4 MG: 2 INJECTION INTRAMUSCULAR; INTRAVENOUS at 07:42

## 2020-01-27 RX ADMIN — MYCOPHENILIC ACID 360 MG: 360 TABLET, DELAYED RELEASE ORAL at 18:12

## 2020-01-27 RX ADMIN — DEXTROSE MONOHYDRATE 250 ML: 100 INJECTION, SOLUTION INTRAVENOUS at 07:32

## 2020-01-27 RX ADMIN — INSULIN HUMAN 5 UNITS: 100 INJECTION, SOLUTION PARENTERAL at 08:11

## 2020-01-27 RX ADMIN — HEPARIN SODIUM 1500 UNITS: 1000 INJECTION, SOLUTION INTRAVENOUS; SUBCUTANEOUS at 09:41

## 2020-01-27 RX ADMIN — ONDANSETRON 4 MG: 2 INJECTION INTRAMUSCULAR; INTRAVENOUS at 03:38

## 2020-01-27 RX ADMIN — ERYTHROPOIETIN 10000 UNITS: 10000 INJECTION, SOLUTION INTRAVENOUS; SUBCUTANEOUS at 11:44

## 2020-01-27 RX ADMIN — OMEPRAZOLE 20 MG: 20 CAPSULE, DELAYED RELEASE ORAL at 23:34

## 2020-01-27 RX ADMIN — OMEPRAZOLE 20 MG: 20 CAPSULE, DELAYED RELEASE ORAL at 13:21

## 2020-01-27 NOTE — DISCHARGE PLANNING
Outpatient Dialysis Note    Confirmed patient is active at:    [facility]  [address] [place] [id number]       Schedule: Monday, Wednesday, Friday  Time: 6:00 am    Spoke with Leila at facility who confirmed.    Forwarded records for review.      Preethi Chao- Dialysis Coordinator  [facility] # [phone]

## 2020-01-27 NOTE — PROGRESS NOTES
2006 Blood glucose checked prior to IV insulin administration. Blood sugar 143. Insulin verified with MICHELLE Mace.    2157 Blood glucose rechecked. Glucose 85. Snacks and juice offered. Patient refused at this time. Labs at 2058 showed potassium 5.0.

## 2020-01-27 NOTE — PROGRESS NOTES
Spoke to MICHELLE Teague regarding patient's scheduled stress test. Patient will need to have stress test done tomorrow due to patient receiving dialysis today. Dr. Hoskins updated.

## 2020-01-27 NOTE — PROGRESS NOTES
0710 Spoke to Dr. Hoskins, hyperkalemia order set entered. Dr. Hoskins requested this RN to call [facility] for patient to receive dialysis.     0720 Attempted to call [facility] per Dr. Hoskins's telephone order, LM requesting call back.     0726 Dr. Trent paged using paging service, awaiting on call back.

## 2020-01-27 NOTE — PROGRESS NOTES
Hospital Medicine Daily Progress Note    Date of Service  1/27/2020    Chief Complaint  24 y.o. female admitted 1/25/2020 with R sided chest pain for 3 days    Hospital Course    chest pain has resolved, no sob/cough/f/c; trops quite elevated however without EKG changes      Interval Problem Update  No recurrent chest pain but has mild R shoulder pain; mild nausea this am    Consultants/Specialty  Cardiology/nephrology    Code Status  full    Disposition  Home when stable    Review of Systems  Review of Systems   All other systems reviewed and are negative.       Physical Exam  Temp:  [36.3 °C (97.4 °F)-36.8 °C (98.2 °F)] 36.7 °C (98.1 °F)  Pulse:  [] 67  Resp:  [18] 18  BP: (131-162)/() 145/96  SpO2:  [88 %-99 %] 96 %    Physical Exam  Constitutional:       Appearance: Normal appearance.   HENT:      Head: Normocephalic.      Nose: Nose normal.      Mouth/Throat:      Mouth: Mucous membranes are moist.   Eyes:      Extraocular Movements: Extraocular movements intact.   Neck:      Musculoskeletal: Normal range of motion and neck supple.   Cardiovascular:      Rate and Rhythm: Normal rate and regular rhythm.      Pulses: Normal pulses.      Heart sounds: Normal heart sounds.   Pulmonary:      Effort: Pulmonary effort is normal.      Breath sounds: Normal breath sounds.   Abdominal:      General: Abdomen is flat. Bowel sounds are normal.      Tenderness: There is no tenderness.   Musculoskeletal:      Right lower leg: No edema.      Left lower leg: No edema.   Skin:     General: Skin is warm.   Neurological:      General: No focal deficit present.      Mental Status: She is alert.   Psychiatric:         Behavior: Behavior normal.         Fluids    Intake/Output Summary (Last 24 hours) at 1/27/2020 0859  Last data filed at 1/27/2020 0700  Gross per 24 hour   Intake 600 ml   Output --   Net 600 ml       Laboratory  Recent Labs     01/25/20  2241 01/27/20  0331   WBC 4.9 4.3*   RBC 3.23* 3.06*   HEMOGLOBIN  9.2* 8.5*   HEMATOCRIT 30.0* 28.2*   MCV 92.9 92.2   MCH 28.5 27.8   MCHC 30.7* 30.1*   RDW 56.3* 55.2*   PLATELETCT 122* 111*   MPV 10.2 10.1     Recent Labs     01/26/20 2058 01/27/20  0621 01/27/20  0816   SODIUM 132* 129* 129*   POTASSIUM 5.0 7.4* 5.9*   CHLORIDE 93* 92* 92*   CO2 22 22 21   GLUCOSE 59* 94 179*   BUN 48* 55* 57*   CREATININE 8.97* 9.81* 9.77*   CALCIUM 9.2 9.0 8.8                   Imaging  EC-ECHOCARDIOGRAM COMPLETE W/O CONT   Final Result      DX-CHEST-2 VIEWS   Final Result         1. No active cardiopulmonary abnormalities are identified.      DX-SHOULDER 2+ RIGHT   Final Result         1. No acute osseous abnormality.      NM-CARDIAC STRESS TEST    (Results Pending)        Assessment/Plan  Elevated troponin- (present on admission)  Assessment & Plan  -Significant elevation however she is a hemodialysis patient  -Continue to trend troponin, decreasing; patient without chest pain/EKG changes today; discussed with cardiology:  Likely related to decreased clearance from ESRD; will do cardiac stress test  -Associated with right-sided chest pain which she states she had similar pain with pericardial effusion and pleural effusion; pain has resolved here  Echocardiogram noted  -Monitor on telemetry    ESRD (end stage renal disease) on dialysis (HCC)- (present on admission)  Assessment & Plan  Continue HD per nephro    Hyperkalemia- (present on admission)  Assessment & Plan  K 7.4 this am, patient asymptomatic and no dysrhythmia; calcium gluconate/IV insulin/glucose given this am  Stat dialysis being done now, nephro consulted  Holding ARB    Hyponatremia- (present on admission)  Assessment & Plan  Correct with dialysis    -Repeat BMP in the morning    Normocytic anemia- (present on admission)  Assessment & Plan  Due to ESRD-No sign of gross bleeding; Epogen per nephro  Monitor cbc;     Elevated brain natriuretic peptide (BNP) level- (present on admission)  Assessment & Plan  -In patient with  end-stage renal disease  -I did personally review her chest x-ray, noted no fluid overload, noted no acute cardiopulmonary process  -No significant lower extremity edema either    Lupus (HCC)- (present on admission)  Assessment & Plan  -Chronic, continue home meds  -No acute flare    Hypertension- (present on admission)  Assessment & Plan  -Hold home losartan due to hyperkalemia  -Continue home atenolol  -Start PRN clonidine and labetalol  -Adjust as needed       VTE prophylaxis: heparin

## 2020-01-27 NOTE — PROGRESS NOTES
Spoke to Dr. Trent, Nephrologist. Patient will be seen by her today and Elizabeth will be contacted by Dr. Trent for patient to receive dialysis. Dr. Trent okay with patient receiving hyperkalemia orders at this time.

## 2020-01-27 NOTE — PROGRESS NOTES
Tolerated her 3 hr HD and 3 liter net UF. RIKY AVF + B/T. Vs stable during Hd. Slept most of the time. She is alert, no complaint. No distress noted. See flow sheet for details. Report given to Primary RN.

## 2020-01-27 NOTE — PROGRESS NOTES
Received bedside report from MICHELLE Oh and MICHELLE Alicea. Plan of care discussed. Safety precautions in place. Call light and personal belongings within reach. Patient has no needs at this time. Family at bedside.

## 2020-01-27 NOTE — CARE PLAN
Problem: Safety  Goal: Will remain free from falls  Outcome: PROGRESSING AS EXPECTED  Note:   Remind patient to use call light and provide assistance. Bed in low position, bed locked, and appropriate alarms set. Patient wearing non-slip socks. Call light and personal belongings are within reach.     Problem: Knowledge Deficit  Goal: Knowledge of disease process/condition, treatment plan, diagnostic tests, and medications will improve  Outcome: PROGRESSING AS EXPECTED  Note:   Encourage patient and family to ask questions and be involved in plan of care. Provide education on treatment plan, diagnostic testing, and medications; have patient and family verbalize understanding.

## 2020-01-27 NOTE — PROGRESS NOTES
Telemetry Strip       Measurements from am strip were as follows:  Rhythm: SR  HR: 76-87  Measurements: 0.20/ 0.08/ 0.36  Ectopy: none             Normal Values  Rhythm SR  HR Range    Measurements 0.12-0.20 / 0.06-0.10  / 0.30-0.52

## 2020-01-27 NOTE — PROGRESS NOTES
Telemetry Shift Summary    Rhythm SR  HR Range 70s-90s  Ectopy -  Measurements 0.18/0.08/0.36        Normal Values  Rhythm SR  HR Range    Measurements 0.12-0.20 / 0.06-0.10  / 0.30-0.52

## 2020-01-27 NOTE — PROCEDURES
Nephrology/Hemodialysis note    Patient with ESRD/HD with severe hyperkalemia    Seen and examined during emergent dialysis  Tolerates dialysis well  No complaints  VS stable  Lab results reviewed  K level repeated  Please see dialysis flow sheet for details

## 2020-01-27 NOTE — PROGRESS NOTES
Dr. Hoskins paged to update on FSBG before Insulin administration for hyperkalemia order set. FSBG was 72.     Spoke to Dr. Hoskins, [facility]SBG rechecked and now 170, 5 units of insulin given per Dr. Hoskins.

## 2020-01-27 NOTE — CARE PLAN
Problem: Pain Management  Goal: Pain level will decrease to patient's comfort goal  Outcome: PROGRESSING AS EXPECTED   Patient declines any pain at the time of assessment, will continue to monitor.     Problem: Knowledge Deficit  Goal: Knowledge of disease process/condition, treatment plan, diagnostic tests, and medications will improve  Outcome: PROGRESSING AS EXPECTED  Discussed plan of care with patient including with patient including nausea medication, hyperkalemia medications ordered. Allowed time for questions. Patient agreed and verbalized understanding.

## 2020-01-27 NOTE — PROGRESS NOTES
Charge RN was about to administer IV insulin per MAR. Point of care glucose was 92. Patient was given 250 mL 10% dextrose at 1841. Dr. Pérez paged and notified of the above.     Per Dr. Pérez administer additional 250 mL 10% dextrose and administer 5 units of insulin IV once instead of the 10 units. Order read back and verified.

## 2020-01-27 NOTE — PROGRESS NOTES
Sarah from Lab called with critical result of Potassium of 7.4 at 0656. Critical lab result read back to Sarah.   Dr. Hoskins paged to notified of critical lab result at 0702. Awaiting on call back.

## 2020-01-27 NOTE — PROGRESS NOTES
Report given to Jerry MARTINEZ. Plan of care discussed. Patient resting comfortably in bed. Safety precautions in place.

## 2020-01-28 ENCOUNTER — APPOINTMENT (OUTPATIENT)
Dept: RADIOLOGY | Facility: MEDICAL CENTER | Age: 25
End: 2020-01-28
Attending: INTERNAL MEDICINE
Payer: COMMERCIAL

## 2020-01-28 ENCOUNTER — PATIENT OUTREACH (OUTPATIENT)
Dept: HEALTH INFORMATION MANAGEMENT | Facility: OTHER | Age: 25
End: 2020-01-28

## 2020-01-28 VITALS
HEART RATE: 71 BPM | OXYGEN SATURATION: 96 % | DIASTOLIC BLOOD PRESSURE: 89 MMHG | BODY MASS INDEX: 21.94 KG/M2 | WEIGHT: 139.77 LBS | RESPIRATION RATE: 18 BRPM | SYSTOLIC BLOOD PRESSURE: 134 MMHG | TEMPERATURE: 97.8 F | HEIGHT: 67 IN

## 2020-01-28 LAB
ALBUMIN SERPL BCP-MCNC: 3.5 G/DL (ref 3.2–4.9)
BUN SERPL-MCNC: 41 MG/DL (ref 8–22)
CALCIUM SERPL-MCNC: 9.2 MG/DL (ref 8.4–10.2)
CHLORIDE SERPL-SCNC: 90 MMOL/L (ref 96–112)
CO2 SERPL-SCNC: 25 MMOL/L (ref 20–33)
CREAT SERPL-MCNC: 7.49 MG/DL (ref 0.5–1.4)
GLUCOSE BLD-MCNC: 170 MG/DL (ref 65–99)
GLUCOSE BLD-MCNC: 72 MG/DL (ref 65–99)
GLUCOSE SERPL-MCNC: 91 MG/DL (ref 65–99)
PHOSPHATE SERPL-MCNC: 5.1 MG/DL (ref 2.5–4.5)
POTASSIUM SERPL-SCNC: 5.2 MMOL/L (ref 3.6–5.5)
SODIUM SERPL-SCNC: 129 MMOL/L (ref 135–145)

## 2020-01-28 PROCEDURE — 80069 RENAL FUNCTION PANEL: CPT

## 2020-01-28 PROCEDURE — G0378 HOSPITAL OBSERVATION PER HR: HCPCS

## 2020-01-28 PROCEDURE — 99217 PR OBSERVATION CARE DISCHARGE: CPT | Performed by: INTERNAL MEDICINE

## 2020-01-28 RX ORDER — CLONIDINE HYDROCHLORIDE 0.1 MG/1
0.1 TABLET ORAL 2 TIMES DAILY
Qty: 60 TAB | Refills: 1 | Status: SHIPPED
Start: 2020-01-28 | End: 2020-02-26

## 2020-01-28 NOTE — CARE PLAN
Problem: Venous Thromboembolism (VTW)/Deep Vein Thrombosis (DVT) Prevention:  Goal: Patient will participate in Venous Thrombosis (VTE)/Deep Vein Thrombosis (DVT)Prevention Measures  Outcome: PROGRESSING AS EXPECTED   Encouraged pt to ambulate, educated on VTE prophylactic and heparin medication    Problem: Knowledge Deficit  Goal: Knowledge of disease process/condition, treatment plan, diagnostic tests, and medications will improve  Outcome: PROGRESSING AS EXPECTED   Provided time for patient to express concerns and ask questions about POC, discussed stress test and answered patients questions

## 2020-01-28 NOTE — CARE PLAN
Problem: Infection  Goal: Will remain free from infection  Outcome: PROGRESSING AS EXPECTED  Note:   Assess and monitor for signs and symptoms of infection. Perform hand hygiene before and after patient contact and entering/exiting the room.     Problem: Venous Thromboembolism (VTW)/Deep Vein Thrombosis (DVT) Prevention:  Goal: Patient will participate in Venous Thrombosis (VTE)/Deep Vein Thrombosis (DVT)Prevention Measures  Outcome: PROGRESSING AS EXPECTED  Note:   Educate patient on unfractioned heparin and SCDs. Have patient ambulate at least 3 times per day to prevent DVT.

## 2020-01-28 NOTE — CONSULTS
DATE OF SERVICE:  01/27/2020    NEPHROLOGY CONSULTATION    REQUESTING PHYSICIAN:  Jorge Styles DO    REASON FOR CONSULTATION:  To evaluate and provide dialysis for patient with   end-stage renal disease with severe hyperkalemia.      HISTORY OF PRESENT ILLNESS:  Patient is a 24-year-old female with end-stage   renal disease, on hemodialysis, who has been receiving her dialysis treatments   Monday, Wednesday, Friday in [facility].  Last dialysis   received on Friday.  She presented to the emergency room with complaints of   worsening dyspnea, chest discomfort and right shoulder pain.  Also had nausea   and episodes of vomiting.  Patient has recent history of GI bleeding and   gastric ulcers.  Upon evaluation, found significantly elevated potassium of   7.4.  Currently, complains still of nausea.  No chest pain, no shortness of   breath, improved shoulder pain, no diarrhea.      REVIEW OF SYSTEMS:    GENERAL:  Positive for fatigue.  No fever or chills.    HEENT:  No sinus pain, no nosebleeds, no sore throat.    EYES:  No double or blurry vision, no eye pain.    NECK:  No pain, no stiffness.    RESPIRATORY:  No shortness of breath, no cough, no hemoptysis, no wheezes.    CARDIOVASCULAR:  No chest pain, no palpitations, no dyspnea.    GASTROINTESTINAL:  Positive for nausea, vomiting.  No diarrhea.    All other systems reviewed, all negative.    PAST MEDICAL HISTORY:  End-stage renal disease on hemodialysis, lupus,   hypertension, gastrointestinal bleeding, gastric ulcers, migraines, seizures.        PAST SURGICAL HISTORY:  Laparoscopy, AV fistula creation, AV fistula revision,   gastroscopy.      SOCIAL HISTORY:  No tobacco, alcohol or drug use.  Lives with family.  She is   single.      FAMILY HISTORY:  No history of lupus.  No history of kidney disease.      ALLERGIES:  ALLERGIC TO CLINDAMYCIN, KEFLEX, METOPROLOL.      OUTPATIENT MEDICATIONS:  Reviewed in the chart.      PHYSICAL EXAMINATION:     VITAL SIGNS:  Blood pressure 145/96, heart rate 67, temperature 36.7 Celsius.  GENERAL APPEARANCE:  Well-developed, well-nourished female in no acute   distress.  HEENT:  Normocephalic, atraumatic.  Pupils equal, round, reactive to light.    Extraocular movement intact.  Nares patent.  Oropharynx, moist mucosa.  No   erythema or exudate.    NECK:  Supple, no lymphadenopathy, no thyromegaly appreciated.    LUNGS:  Clear to auscultation bilaterally.  No rales, wheezes, no rhonchi.    HEART:  Regular rate.  No rub or gallop.    ABDOMEN:  Soft, tender, distended.  Bowel sounds present.    EXTREMITIES:  Trace pedal edema.  No cyanosis.  NEUROLOGIC:  Alert, oriented x3, no focal deficit.  Cranial nerves II-XII   grossly intact.      LABORATORY RESULTS:  Hemoglobin level 8.5.  Sodium 129, potassium is 5.9,   chloride 92, BUN 57, creatinine 9.7.        DICTATION ENDS HERE..       ____________________________________     MD ROSANNA MOREIRA / MADHU    DD:  01/27/2020 13:51:32  DT:  01/27/2020 17:08:40    D#:  [id number]  Job#:  [id number]

## 2020-01-28 NOTE — PROGRESS NOTES
Telemetry Shift Summary    Rhythm SR; 4 beats of ventricular tachycardia (0003)  HR Range 60s-80s  Ectopy -  Measurements 0.16/0.08/0.36        Normal Values  Rhythm SR  HR Range    Measurements 0.12-0.20 / 0.06-0.10  / 0.30-0.52

## 2020-01-28 NOTE — PROGRESS NOTES
Discharged order written, IV removed, tele removed and returned to the monitor tech. Patient discharged to home with family. AVS printed and revieved copied signed and placed on chart. Discharged instructions provided to patient. Patient verbalizes understanding. Patent states all questions have been answered. Patient states that all personal belongings are in possesion..  Prescriptions sent to pharmacy, [facility] . Patient off unit by self

## 2020-01-28 NOTE — DISCHARGE PLANNING
Care Transition Team Assessment  NOK: Chikis Nicole  [phone]  LSW met with pt at bedside. Pt was alert and oriented x4. LSW verified all demographic information and preferred pharmacy. PCP on file. No AD. Pt states she is independent with all ADLs. Pt denies hx with substance abuse or mental health issues. Pt is on dialysis 3x/week.Pt plans to discharge home with the support from her parents.      Information Source  Orientation : Oriented x 4  Information Given By: Patient  Informant's Name: Lily Nicole  Who is responsible for making decisions for patient? : Patient    Readmission Evaluation  Is this a readmission?: No    Elopement Risk  Legal Hold: No  Ambulatory or Self Mobile in Wheelchair: No-Not an Elopement Risk  Disoriented: No  Psychiatric Symptoms: None  History of Wandering: No  Elopement this Admit: No  Vocalizing Wanting to Leave: No  Displays Behaviors, Body Language Wanting to Leave: No-Not at Risk for Elopement  Elopement Risk: Not at Risk for Elopement    Interdisciplinary Discharge Planning  Does Admitting Nurse Feel This Could be a Complex Discharge?: No  Primary Care Physician: Dr. Hou  Lives with - Patient's Self Care Capacity: Parents  Patient or legal guardian wants to designate a caregiver (see row info): No  Support Systems: Friends / Neighbors, Parent  Housing / Facility: [address]  Do You Take your Prescribed Medications Regularly: Yes  Able to Return to Previous ADL's: Yes  Mobility Issues: No  Prior Services: None  Patient Expects to be Discharged to:: Home  Assistance Needed: No  Durable Medical Equipment: Not Applicable    Discharge Preparedness  What is your plan after discharge?: Home with help  What are your discharge supports?: Parent  Prior Functional Level: Independent with Activities of Daily Living, Independent with Medication Management  Difficulity with ADLs: None  Difficulity with IADLs: None    Functional Assesment  Prior Functional Level:  Independent with Activities of Daily Living, Independent with Medication Management    Finances  Financial Barriers to Discharge: No  Prescription Coverage: Yes    Vision / Hearing Impairment  Vision Impairment : No  Hearing Impairment : No         Advance Directive  Advance Directive?: None  Advance Directive offered?: AD Booklet refused    Domestic Abuse  Have you ever been the victim of abuse or violence?: No  Physical Abuse or Sexual Abuse: No  Verbal Abuse or Emotional Abuse: No  Possible Abuse Reported to:: Not Applicable    Psychological Assessment  History of Substance Abuse: None  History of Psychiatric Problems: No  Non-compliant with Treatment: No  Newly Diagnosed Illness: No    Discharge Risks or Barriers  Discharge risks or barriers?: No    Anticipated Discharge Information  Anticipated discharge disposition: Home  Discharge Address: [address] [place] [id number]  Discharge Contact Phone Number: [phone]

## 2020-01-28 NOTE — PROGRESS NOTES
Completed assessment and administered scheduled medications. Bed in low position, locked, and appropriate alarms set. Personal belongings and call light are within reach. Patient has no additional needs at this time. Mother at bedside.

## 2020-01-28 NOTE — PROGRESS NOTES
Received report from MICHELLE Edwards. Patient is sleeping, Pt resting comfortably in bed, Call light within reach and safety precautions in place.          08:30 - Assess pt, plan of care discussed with patient, declines any needs at this time and denies pain. Call light with in reach and belongings close by.    Nuc Med called and wanted to hold the stress test because of pt's renal function, notified Dr. Dior

## 2020-01-28 NOTE — PROGRESS NOTES
Patient's PIV infiltrated. Patient requesting shower before new PIV placed, will contact MD for order.

## 2020-01-28 NOTE — DISCHARGE SUMMARY
"Discharge Summary    CHIEF COMPLAINT ON ADMISSION  Chief Complaint   Patient presents with   • Shortness of Breath     started about 3 days ago  had same symptoms about 3-4 years ago    • Shoulder Pain     for the last week now  denies injury        Reason for Admission  Difficulty Breathing    Admission Date  1/25/2020    CODE STATUS  Full Code    HPI & HOSPITAL COURSE  This is a 24 y.o. female with a history of lupus complicated by end-stage renal disease on dialysis Monday was a Friday admitted for shortness of breath and chest tightness found to have hyperkalemia.  She was also found to have elevated troponin however, this was determined secondary to reduced renal clearance from end-stage renal disease.  Nephrology was consulted for urgent hemodialysis which was performed for potassium lowering purposes.  She did her Cozaar was held as this is a known contributor to hyperkalemia.  Her potassium levels slowly improved over the next 48 hours.  Echocardiogram was performed and was essentially normal.  Her troponin levels trended down with dialysis, further supporting etiology of reduced renal clearance.  She did not require any further cardiac work-up.  On the day of discharge her potassium had improved to 5.2 therefore, she was discharged in stable condition and will continue her regularly scheduled dialysis as an outpatient.        Therefore, she is discharged in good and stable condition to home with close outpatient follow-up.    The patient met 2-midnight criteria for an inpatient stay at the time of discharge.    Discharge Date  1/28/2020    FOLLOW UP ITEMS POST DISCHARGE  Follow-up with Dr. Trent, nephrology  Continue regular outpatient dialysis schedule    DISCHARGE DIAGNOSES  Active Problems:    Elevated troponin POA: Yes    ESRD (end stage renal disease) on dialysis (HCC) POA: Yes    Hypertension POA: Yes      Overview: \"Controlled with medication\"    Lupus (HCC) POA: Yes    Elevated brain natriuretic " peptide (BNP) level POA: Yes    Normocytic anemia POA: Yes    Hyponatremia POA: Yes    Hyperkalemia POA: Yes  Resolved Problems:    * No resolved hospital problems. *      FOLLOW UP  Future Appointments   Date Time Provider Department Center   2/3/2020  8:00 AM [facility] IR [facility]     Ella Matthew M.D.  [address]  [place] [address]  [phone]    Go on 2/3/2020  Please check in with your primary care office for a hospital follow up at 10:50AM. Thank you!      MEDICATIONS ON DISCHARGE     Medication List      START taking these medications      Instructions   cloNIDine 0.1 MG Tabs  Commonly known as:  CATAPRES   Take 1 Tab by mouth 2 times a day.  Dose:  0.1 mg        CONTINUE taking these medications      Instructions   acetaminophen 500 MG Tabs  Commonly known as:  TYLENOL   Take 500-1,000 mg by mouth every 6 hours as needed for Moderate Pain.  Dose:  500-1,000 mg     atenolol 25 MG Tabs  Commonly known as:  TENORMIN   Take 25 mg by mouth every evening.  Dose:  25 mg     hydroxychloroquine 200 MG Tabs  Commonly known as:  PLAQUENIL   Take 200 mg by mouth every evening.  Dose:  200 mg     MYFORTIC 360 MG Tbec tablet  Generic drug:  mycophenolate sodium   Take 360 mg by mouth every evening.  Dose:  360 mg     omeprazole 20 MG tablet  Commonly known as:  PRILOSEC OTC   Take 1 Tab by mouth 2 times a day. Indications: Heartburn  Dose:  20 mg     predniSONE 5 MG Tabs  Commonly known as:  DELTASONE   Take 5 mg by mouth every evening.  Dose:  5 mg        STOP taking these medications    losartan 100 MG Tabs  Commonly known as:  COZAAR            Allergies  Allergies   Allergen Reactions   • Clindamycin Rash     Hive   • Keflex Rash     Hives   • Metoprolol Nausea       DIET  Orders Placed This Encounter   Procedures   • Diet Order Renal     Standing Status:   Standing     Number of Occurrences:   1     Order Specific Question:   Diet:     Answer:   Renal [8]       ACTIVITY  As tolerated.  Weight  bearing as tolerated    CONSULTATIONS  Dr. Tretn, nephrology    PROCEDURES  None    LABORATORY  Lab Results   Component Value Date    SODIUM 129 (L) 01/28/2020    POTASSIUM 5.2 01/28/2020    CHLORIDE 90 (L) 01/28/2020    CO2 25 01/28/2020    GLUCOSE 91 01/28/2020    BUN 41 (H) 01/28/2020    CREATININE 7.49 (HH) 01/28/2020        Lab Results   Component Value Date    WBC 4.3 (L) 01/27/2020    HEMOGLOBIN 8.5 (L) 01/27/2020    HEMATOCRIT 28.2 (L) 01/27/2020    PLATELETCT 111 (L) 01/27/2020        Total time of the discharge process exceeds 39 minutes.

## 2020-01-28 NOTE — CONSULTS
DATE OF SERVICE:  01/27/2020    ADDENDUM    NEPHROLOGY CONSULTATION    REQUESTING PHYSICIAN:  Jorge Styles DO    ASSESSMENT AND PLAN:  Patient is a 24-year-old female with end-stage renal   disease, on hemodialysis, admitted with shortness of breath, chest discomfort   and hyperkalemia.    1.  End-stage renal disease.  We will continue dialysis per patient's schedule   Monday, Wednesday, Friday.    2.  Electrolytes.  Hyperkalemia will be correcting with dialysis, low   potassium bath, to provide low potassium diet.  Monitor potassium closely.    3.  Hypertension.  Elevated blood pressure should improve with increased   ultrafiltration and better volume control.    4.  Anemia.  To add Epogen 10,000 units with hemodialysis.      RECOMMENDATIONS:    1.  Emergent dialysis today.  Continue Monday, Wednesday, and Friday.     2.  Low potassium diet.  3.  Renal diet.  4.  All medications adjust to renal doses.    5.  We will follow up patient closely.      Thank you for the consult.       ____________________________________     MD ROSANNA MOREIRA / NTS    DD:  01/27/2020 13:58:05  DT:  01/27/2020 17:12:08    D#:  [id number]  Job#:  [id number]

## 2020-01-28 NOTE — PROGRESS NOTES
Telemetry Shift Summary    Rhythm SR-ST  HR Range 60-100s  Ectopy none  Measurements .18/.10/.40        Normal Values  Rhythm SR  HR Range    Measurements 0.12-0.20 / 0.06-0.10  / 0.30-0.52

## 2020-01-28 NOTE — DISCHARGE INSTRUCTIONS
Discharge Instructions    Discharged to home by car with relative. Discharged via walking, hospital escort: Refused.  Special equipment needed: Not Applicable    Be sure to schedule a follow-up appointment with your primary care doctor or any specialists as instructed.     Discharge Plan:   Influenza Vaccine Indication: Not indicated: Previously immunized this influenza season and > 8 years of age    I understand that a diet low in cholesterol, fat, and sodium is recommended for good health. Unless I have been given specific instructions below for another diet, I accept this instruction as my diet prescription.   Other diet: Renal    Special Instructions: None    · Is patient discharged on Warfarin / Coumadin?   No     Depression / Suicide Risk    As you are discharged from this [facility] facility, it is important to learn how to keep safe from harming yourself.    Recognize the warning signs:  · Abrupt changes in personality, positive or negative- including increase in energy   · Giving away possessions  · Change in eating patterns- significant weight changes-  positive or negative  · Change in sleeping patterns- unable to sleep or sleeping all the time   · Unwillingness or inability to communicate  · Depression  · Unusual sadness, discouragement and loneliness  · Talk of wanting to die  · Neglect of personal appearance   · Rebelliousness- reckless behavior  · Withdrawal from people/activities they love  · Confusion- inability to concentrate     If you or a loved one observes any of these behaviors or has concerns about self-harm, here's what you can do:  · Talk about it- your feelings and reasons for harming yourself  · Remove any means that you might use to hurt yourself (examples: pills, rope, extension cords, firearm)  · Get professional help from the community (Mental Health, Substance Abuse, psychological counseling)  · Do not be alone:Call your Safe Contact- someone whom you trust who will be there for  you.  · Call your local CRISIS HOTLINE 067-8505 or 651-843-9162  · Call your local Children's Mobile Crisis Response Team [place] (625) 083-6634 or www.[employer]  · Call the toll free National Suicide Prevention Hotlines   · National Suicide Prevention Lifeline 438-823-XTNX (4230)  · National Hope Line Network 800-SUICIDE (597-4156)      Clonidine extended release tablets (ADHD)  What is this medicine?  CLONIDINE (KLOE ni quentin) is used to treat attention-deficit hyperactivity disorder (ADHD).  This medicine may be used for other purposes; ask your health care provider or pharmacist if you have questions.  COMMON BRAND NAME(S): Kapvay  What should I tell my health care provider before I take this medicine?  They need to know if you have any of these conditions:  -bleeding in the brain  -heart disease  -high or low blood pressure  -history of slow or irregular heartbeat  -kidney disease  -an unusual or allergic reaction to clonidine, other medicines, foods, dyes, or preservatives  -pregnant or trying to get pregnant  -breast-feeding  How should I use this medicine?  Take this medicine by mouth with a glass of water. Follow the directions on the prescription label. Do not cut, crush or chew this medicine. You can take it with or without food. If it upsets your stomach, take it with food. Take your doses at regular intervals. Do not take your medicine more often than directed. Do not suddenly stop taking this medicine. You must gradually reduce the dose. Ask your doctor or health care professional for advice.  Talk to your pediatrician regarding the use of this medicine in children. While this drug may be prescribed for children as young as 6 years for selected conditions, precautions do apply.  Overdosage: If you think you have taken too much of this medicine contact a poison control center or emergency room at once.  NOTE: This medicine is only for you. Do not share this medicine with others.  What if I  miss a dose?  If you miss a dose, skip the missed dose. Take the next dose at your regular time. Do not take double or extra doses.  What may interact with this medicine?  Do not take this medicine with any of the following medications:  -MAOIs like Carbex, Eldepryl, Marplan, Nardil, and Parnate  This medicine may also interact with the following medications:  -alcohol  -certain medicines for seizures like phenobarbital  -certain medicines for blood pressure, heart disease, irregular heart beat  -certain medicines for depression, anxiety, or psychotic disturbances  -medicines for sleep  This list may not describe all possible interactions. Give your health care provider a list of all the medicines, herbs, non-prescription drugs, or dietary supplements you use. Also tell them if you smoke, drink alcohol, or use illegal drugs. Some items may interact with your medicine.  What should I watch for while using this medicine?  Visit your doctor or health care professional for regular checks on your progress. Check your heart rate and blood pressure regularly while you are taking this medicine. Ask your doctor or health care professional what your heart rate should be and when you should contact him or her.  You may get drowsy or dizzy. Do not drive, use machinery, or do anything that needs mental alertness until you know how this medicine affects you. To avoid dizzy or fainting spells, do not stand or sit up quickly. Alcohol can make you more drowsy and dizzy. Avoid alcoholic drinks.  Avoid becoming dehydrated or overheated.  Do not stop taking except on your doctor's advice. You may develop a severe reaction. Your doctor will tell you how much medicine to take.  Your mouth may get dry. Chewing sugarless gum or sucking hard candy, and drinking plenty of water will help.  What side effects may I notice from receiving this medicine?  Side effects that you should report to your doctor or health care professional as soon as  possible:  -allergic reactions like skin rash, itching or hives, swelling of the face, lips, or tongue  -anxious or change in mood  -increased body temperature  -low blood pressure  -unusually slow heartbeat  -unusually weak or tired  Side effects that usually do not require medical attention (report to your doctor or health care professional if they continue or are bothersome):  -constipation  -dizziness  -dry mouth  -headache  -loss of appetite  -nightmares or trouble sleeping  -tiredness  This list may not describe all possible side effects. Call your doctor for medical advice about side effects. You may report side effects to FDA at 3-435-KLN-0178.  Where should I keep my medicine?  Keep out of the reach of children.  Store at room temperature between 20 and 25 degrees C (68 and 77 degrees F). Protect from light. Keep container tightly closed. Throw away any unused medicine after the expiration date.  NOTE: This sheet is a summary. It may not cover all possible information. If you have questions about this medicine, talk to your doctor, pharmacist, or health care provider.  © 2018 [employer]/Gold Standard (2017-01-19 10:04:59)

## 2020-01-28 NOTE — PROGRESS NOTES
Received report from MICHELLE Alicea. Plan of care discussed. Safety precautions in place. Call light and personal belongings within reach. Patient has no needs at this time. Patient remind of dietary restrictions for stress test in AM tomorrow.

## 2020-01-29 NOTE — PROGRESS NOTES
Telemetry Shift Summary    Rhythm:  SR   HR Range: 69-74  Ectopy: none  Measurements: .14/.08/.39          Normal Values  Rhythm SR  HR Range   Measurements 0.12 / 0.20 / 0.06-0.10 / 0.30-0.52

## 2020-02-04 ENCOUNTER — HOSPITAL ENCOUNTER (OUTPATIENT)
Dept: LAB | Facility: MEDICAL CENTER | Age: 25
End: 2020-02-04
Payer: COMMERCIAL

## 2020-02-18 ENCOUNTER — HOSPITAL ENCOUNTER (OUTPATIENT)
Dept: LAB | Facility: MEDICAL CENTER | Age: 25
End: 2020-02-18
Attending: INTERNAL MEDICINE
Payer: COMMERCIAL

## 2020-02-18 PROCEDURE — 86635 COCCIDIOIDES ANTIBODY: CPT

## 2020-02-23 LAB
C IMMITIS IGM SPEC QL IA: 0.1 IV
COCCIDIOIDES AB SPEC QL ID: NORMAL
COCCIDIOIDES AB TITR SER CF: NORMAL {TITER}
COCCIDIOIDES IGG SPEC QL IA: 0.3 IV

## 2020-02-25 ENCOUNTER — APPOINTMENT (RX ONLY)
Dept: URBAN - METROPOLITAN AREA CLINIC 22 | Facility: CLINIC | Age: 25
Setting detail: DERMATOLOGY
End: 2020-02-25

## 2020-02-25 DIAGNOSIS — L70.0 ACNE VULGARIS: ICD-10-CM | Status: IMPROVED

## 2020-02-25 PROCEDURE — ? PRESCRIPTION

## 2020-02-25 PROCEDURE — ? COUNSELING

## 2020-02-25 PROCEDURE — 99212 OFFICE O/P EST SF 10 MIN: CPT

## 2020-02-25 RX ORDER — MINOCYCLINE 40 MG/G
AEROSOL, FOAM TOPICAL
Qty: 1 | Refills: 2 | Status: ERX | COMMUNITY
Start: 2020-02-25

## 2020-02-25 RX ADMIN — MINOCYCLINE: 40 AEROSOL, FOAM TOPICAL at 00:00

## 2020-02-25 ASSESSMENT — LOCATION DETAILED DESCRIPTION DERM: LOCATION DETAILED: LEFT CENTRAL MALAR CHEEK

## 2020-02-25 ASSESSMENT — LOCATION ZONE DERM: LOCATION ZONE: FACE

## 2020-02-25 ASSESSMENT — SEVERITY ASSESSMENT OVERALL AMONG ALL PATIENTS
IN YOUR EXPERIENCE, AMONG ALL PATIENTS YOU HAVE SEEN WITH THIS CONDITION, HOW SEVERE IS THIS PATIENT'S CONDITION?: ALMOST CLEAR

## 2020-02-25 ASSESSMENT — LOCATION SIMPLE DESCRIPTION DERM: LOCATION SIMPLE: LEFT CHEEK

## 2020-02-25 NOTE — HPI: PIMPLES (ACNE)
How Severe Is Your Acne?: severe
Is This A New Presentation, Or A Follow-Up?: Follow Up Acne
What Type Of Note Output Would You Prefer (Optional)?: Standard Output
Additional Comments (Use Complete Sentences): Currently using vanicream and vanicream gentle facial cleanser. Currently on dialysis.

## 2020-02-25 NOTE — PROCEDURE: COUNSELING
High Dose Vitamin A Counseling: Side effects reviewed, pt to contact office should one occur.
Benzoyl Peroxide Pregnancy And Lactation Text: This medication is Pregnancy Category C. It is unknown if benzoyl peroxide is excreted in breast milk.
Erythromycin Counseling:  I discussed with the patient the risks of erythromycin including but not limited to GI upset, allergic reaction, drug rash, diarrhea, increase in liver enzymes, and yeast infections.
Topical Sulfur Applications Counseling: Topical Sulfur Counseling: Patient counseled that this medication may cause skin irritation or allergic reactions.  In the event of skin irritation, the patient was advised to reduce the amount of the drug applied or use it less frequently.   The patient verbalized understanding of the proper use and possible adverse effects of topical sulfur application.  All of the patient's questions and concerns were addressed.
Topical Retinoid Pregnancy And Lactation Text: This medication is Pregnancy Category C. It is unknown if this medication is excreted in breast milk.
Spironolactone Pregnancy And Lactation Text: This medication can cause feminization of the male fetus and should be avoided during pregnancy. The active metabolite is also found in breast milk.
Detail Level: Zone
Topical Clindamycin Pregnancy And Lactation Text: This medication is Pregnancy Category B and is considered safe during pregnancy. It is unknown if it is excreted in breast milk.
High Dose Vitamin A Pregnancy And Lactation Text: High dose vitamin A therapy is contraindicated during pregnancy and breast feeding.
Isotretinoin Counseling: Patient should get monthly blood tests, not donate blood, not drive at night if vision affected, not share medication, and not undergo elective surgery for 6 months after tx completed. Side effects reviewed, pt to contact office should one occur.
Spironolactone Counseling: Patient advised regarding risks of diarrhea, abdominal pain, hyperkalemia, birth defects (for female patients), liver toxicity and renal toxicity. The patient may need blood work to monitor liver and kidney function and potassium levels while on therapy. The patient verbalized understanding of the proper use and possible adverse effects of spironolactone.  All of the patient's questions and concerns were addressed.
Doxycycline Counseling:  Patient counseled regarding possible photosensitivity and increased risk for sunburn.  Patient instructed to avoid sunlight, if possible.  When exposed to sunlight, patients should wear protective clothing, sunglasses, and sunscreen.  The patient was instructed to call the office immediately if the following severe adverse effects occur:  hearing changes, easy bruising/bleeding, severe headache, or vision changes.  The patient verbalized understanding of the proper use and possible adverse effects of doxycycline.  All of the patient's questions and concerns were addressed.
Topical Sulfur Applications Pregnancy And Lactation Text: This medication is Pregnancy Category C and has an unknown safety profile during pregnancy. It is unknown if this topical medication is excreted in breast milk.
Topical Retinoid counseling:  Patient advised to apply a pea-sized amount only at bedtime and wait 30 minutes after washing their face before applying.  If too drying, patient may add a non-comedogenic moisturizer. The patient verbalized understanding of the proper use and possible adverse effects of retinoids.  All of the patient's questions and concerns were addressed.
Birth Control Pills Pregnancy And Lactation Text: This medication should be avoided if pregnant and for the first 30 days post-partum.
Tetracycline Pregnancy And Lactation Text: This medication is Pregnancy Category D and not consider safe during pregnancy. It is also excreted in breast milk.
Dapsone Counseling: I discussed with the patient the risks of dapsone including but not limited to hemolytic anemia, agranulocytosis, rashes, methemoglobinemia, kidney failure, peripheral neuropathy, headaches, GI upset, and liver toxicity.  Patients who start dapsone require monitoring including baseline LFTs and weekly CBCs for the first month, then every month thereafter.  The patient verbalized understanding of the proper use and possible adverse effects of dapsone.  All of the patient's questions and concerns were addressed.
Erythromycin Pregnancy And Lactation Text: This medication is Pregnancy Category B and is considered safe during pregnancy. It is also excreted in breast milk.
Bactrim Counseling:  I discussed with the patient the risks of sulfa antibiotics including but not limited to GI upset, allergic reaction, drug rash, diarrhea, dizziness, photosensitivity, and yeast infections.  Rarely, more serious reactions can occur including but not limited to aplastic anemia, agranulocytosis, methemoglobinemia, blood dyscrasias, liver or kidney failure, lung infiltrates or desquamative/blistering drug rashes.
Tazorac Counseling:  Patient advised that medication is irritating and drying.  Patient may need to apply sparingly and wash off after an hour before eventually leaving it on overnight.  The patient verbalized understanding of the proper use and possible adverse effects of tazorac.  All of the patient's questions and concerns were addressed.
Birth Control Pills Counseling: Birth Control Pill Counseling: I discussed with the patient the potential side effects of OCPs including but not limited to increased risk of stroke, heart attack, thrombophlebitis, deep venous thrombosis, hepatic adenomas, breast changes, GI upset, headaches, and depression.  The patient verbalized understanding of the proper use and possible adverse effects of OCPs. All of the patient's questions and concerns were addressed.
Patient Specific Counseling (Will Not Stick From Patient To Patient): Continue with vanicream \\nOk to spot treat with amzeeq but if worsens and is itchy she is to discontinue \\nIf itchy after dialysis recommend trying antihistamine if ok by renal physician
Isotretinoin Pregnancy And Lactation Text: This medication is Pregnancy Category X and is considered extremely dangerous during pregnancy. It is unknown if it is excreted in breast milk.
Bactrim Pregnancy And Lactation Text: This medication is Pregnancy Category D and is known to cause fetal risk.  It is also excreted in breast milk.
Minocycline Counseling: Patient advised regarding possible photosensitivity and discoloration of the teeth, skin, lips, tongue and gums.  Patient instructed to avoid sunlight, if possible.  When exposed to sunlight, patients should wear protective clothing, sunglasses, and sunscreen.  The patient was instructed to call the office immediately if the following severe adverse effects occur:  hearing changes, easy bruising/bleeding, severe headache, or vision changes.  The patient verbalized understanding of the proper use and possible adverse effects of minocycline.  All of the patient's questions and concerns were addressed.
Doxycycline Pregnancy And Lactation Text: This medication is Pregnancy Category D and not consider safe during pregnancy. It is also excreted in breast milk but is considered safe for shorter treatment courses.
Tetracycline Counseling: Patient counseled regarding possible photosensitivity and increased risk for sunburn.  Patient instructed to avoid sunlight, if possible.  When exposed to sunlight, patients should wear protective clothing, sunglasses, and sunscreen.  The patient was instructed to call the office immediately if the following severe adverse effects occur:  hearing changes, easy bruising/bleeding, severe headache, or vision changes.  The patient verbalized understanding of the proper use and possible adverse effects of tetracycline.  All of the patient's questions and concerns were addressed. Patient understands to avoid pregnancy while on therapy due to potential birth defects.
Tazorac Pregnancy And Lactation Text: This medication is not safe during pregnancy. It is unknown if this medication is excreted in breast milk.
Benzoyl Peroxide Counseling: Patient counseled that medicine may cause skin irritation and bleach clothing.  In the event of skin irritation, the patient was advised to reduce the amount of the drug applied or use it less frequently.   The patient verbalized understanding of the proper use and possible adverse effects of benzoyl peroxide.  All of the patient's questions and concerns were addressed.
Azithromycin Pregnancy And Lactation Text: This medication is considered safe during pregnancy and is also secreted in breast milk.
Use Enhanced Medication Counseling?: No
Azithromycin Counseling:  I discussed with the patient the risks of azithromycin including but not limited to GI upset, allergic reaction, drug rash, diarrhea, and yeast infections.
Topical Clindamycin Counseling: Patient counseled that this medication may cause skin irritation or allergic reactions.  In the event of skin irritation, the patient was advised to reduce the amount of the drug applied or use it less frequently.   The patient verbalized understanding of the proper use and possible adverse effects of clindamycin.  All of the patient's questions and concerns were addressed.
Dapsone Pregnancy And Lactation Text: This medication is Pregnancy Category C and is not considered safe during pregnancy or breast feeding.

## 2020-02-26 ENCOUNTER — HOSPITAL ENCOUNTER (EMERGENCY)
Facility: MEDICAL CENTER | Age: 25
End: 2020-02-26
Attending: EMERGENCY MEDICINE
Payer: COMMERCIAL

## 2020-02-26 ENCOUNTER — APPOINTMENT (OUTPATIENT)
Dept: RADIOLOGY | Facility: MEDICAL CENTER | Age: 25
End: 2020-02-26
Attending: EMERGENCY MEDICINE
Payer: COMMERCIAL

## 2020-02-26 VITALS
TEMPERATURE: 98 F | HEART RATE: 85 BPM | HEIGHT: 67 IN | WEIGHT: 145.28 LBS | OXYGEN SATURATION: 78 % | DIASTOLIC BLOOD PRESSURE: 82 MMHG | BODY MASS INDEX: 22.8 KG/M2 | RESPIRATION RATE: 18 BRPM | SYSTOLIC BLOOD PRESSURE: 122 MMHG

## 2020-02-26 DIAGNOSIS — R52 BODY ACHES: ICD-10-CM

## 2020-02-26 DIAGNOSIS — M79.10 MYALGIA: ICD-10-CM

## 2020-02-26 DIAGNOSIS — B34.9 VIRAL SYNDROME: ICD-10-CM

## 2020-02-26 LAB
ALBUMIN SERPL BCP-MCNC: 3.8 G/DL (ref 3.2–4.9)
ALBUMIN/GLOB SERPL: 0.7 G/DL
ALP SERPL-CCNC: 56 U/L (ref 30–99)
ALT SERPL-CCNC: 11 U/L (ref 2–50)
ANION GAP SERPL CALC-SCNC: 14 MMOL/L (ref 7–16)
APPEARANCE UR: CLEAR
AST SERPL-CCNC: 23 U/L (ref 12–45)
BACTERIA #/AREA URNS HPF: NEGATIVE /HPF
BASOPHILS # BLD AUTO: 0.8 % (ref 0–1.8)
BASOPHILS # BLD: 0.03 K/UL (ref 0–0.12)
BILIRUB SERPL-MCNC: 0.3 MG/DL (ref 0.1–1.5)
BILIRUB UR QL STRIP.AUTO: NEGATIVE
BUN SERPL-MCNC: 26 MG/DL (ref 8–22)
CALCIUM SERPL-MCNC: 9.2 MG/DL (ref 8.4–10.2)
CHLORIDE SERPL-SCNC: 85 MMOL/L (ref 96–112)
CO2 SERPL-SCNC: 28 MMOL/L (ref 20–33)
COLOR UR: YELLOW
CREAT SERPL-MCNC: 5.32 MG/DL (ref 0.5–1.4)
EKG IMPRESSION: NORMAL
EOSINOPHIL # BLD AUTO: 0.05 K/UL (ref 0–0.51)
EOSINOPHIL NFR BLD: 1.3 % (ref 0–6.9)
EPI CELLS #/AREA URNS HPF: ABNORMAL /HPF
ERYTHROCYTE [DISTWIDTH] IN BLOOD BY AUTOMATED COUNT: 54.4 FL (ref 35.9–50)
GLOBULIN SER CALC-MCNC: 5.2 G/DL (ref 1.9–3.5)
GLUCOSE SERPL-MCNC: 88 MG/DL (ref 65–99)
GLUCOSE UR STRIP.AUTO-MCNC: NEGATIVE MG/DL
HCT VFR BLD AUTO: 29.5 % (ref 37–47)
HGB BLD-MCNC: 9.1 G/DL (ref 12–16)
IMM GRANULOCYTES # BLD AUTO: 0.01 K/UL (ref 0–0.11)
IMM GRANULOCYTES NFR BLD AUTO: 0.3 % (ref 0–0.9)
KETONES UR STRIP.AUTO-MCNC: NEGATIVE MG/DL
LACTATE BLD-SCNC: 1.4 MMOL/L (ref 0.5–2)
LEUKOCYTE ESTERASE UR QL STRIP.AUTO: NEGATIVE
LYMPHOCYTES # BLD AUTO: 0.63 K/UL (ref 1–4.8)
LYMPHOCYTES NFR BLD: 16.4 % (ref 22–41)
MAGNESIUM SERPL-MCNC: 1.7 MG/DL (ref 1.5–2.5)
MCH RBC QN AUTO: 27.1 PG (ref 27–33)
MCHC RBC AUTO-ENTMCNC: 30.8 G/DL (ref 33.6–35)
MCV RBC AUTO: 87.8 FL (ref 81.4–97.8)
MICRO URNS: ABNORMAL
MONOCYTES # BLD AUTO: 0.35 K/UL (ref 0–0.85)
MONOCYTES NFR BLD AUTO: 9.1 % (ref 0–13.4)
NEUTROPHILS # BLD AUTO: 2.77 K/UL (ref 2–7.15)
NEUTROPHILS NFR BLD: 72.1 % (ref 44–72)
NITRITE UR QL STRIP.AUTO: NEGATIVE
NRBC # BLD AUTO: 0 K/UL
NRBC BLD-RTO: 0 /100 WBC
PH UR STRIP.AUTO: 8.5 [PH] (ref 5–8)
PLATELET # BLD AUTO: 117 K/UL (ref 164–446)
PMV BLD AUTO: 10 FL (ref 9–12.9)
POTASSIUM SERPL-SCNC: 4.9 MMOL/L (ref 3.6–5.5)
PROT SERPL-MCNC: 9 G/DL (ref 6–8.2)
PROT UR QL STRIP: 100 MG/DL
RBC # BLD AUTO: 3.36 M/UL (ref 4.2–5.4)
RBC # URNS HPF: ABNORMAL /HPF
RBC UR QL AUTO: ABNORMAL
SODIUM SERPL-SCNC: 127 MMOL/L (ref 135–145)
SP GR UR STRIP.AUTO: 1.01
WBC # BLD AUTO: 3.8 K/UL (ref 4.8–10.8)
WBC #/AREA URNS HPF: ABNORMAL /HPF

## 2020-02-26 PROCEDURE — 85025 COMPLETE CBC W/AUTO DIFF WBC: CPT

## 2020-02-26 PROCEDURE — 93005 ELECTROCARDIOGRAM TRACING: CPT | Performed by: EMERGENCY MEDICINE

## 2020-02-26 PROCEDURE — 81001 URINALYSIS AUTO W/SCOPE: CPT

## 2020-02-26 PROCEDURE — 83605 ASSAY OF LACTIC ACID: CPT

## 2020-02-26 PROCEDURE — A9270 NON-COVERED ITEM OR SERVICE: HCPCS | Performed by: EMERGENCY MEDICINE

## 2020-02-26 PROCEDURE — 83735 ASSAY OF MAGNESIUM: CPT

## 2020-02-26 PROCEDURE — 99284 EMERGENCY DEPT VISIT MOD MDM: CPT

## 2020-02-26 PROCEDURE — 36415 COLL VENOUS BLD VENIPUNCTURE: CPT

## 2020-02-26 PROCEDURE — 700102 HCHG RX REV CODE 250 W/ 637 OVERRIDE(OP): Performed by: EMERGENCY MEDICINE

## 2020-02-26 PROCEDURE — 80053 COMPREHEN METABOLIC PANEL: CPT

## 2020-02-26 PROCEDURE — 71045 X-RAY EXAM CHEST 1 VIEW: CPT

## 2020-02-26 RX ORDER — ACETAMINOPHEN 325 MG/1
650 TABLET ORAL ONCE
Status: COMPLETED | OUTPATIENT
Start: 2020-02-26 | End: 2020-02-26

## 2020-02-26 RX ADMIN — ACETAMINOPHEN 650 MG: 325 TABLET, FILM COATED ORAL at 21:32

## 2020-02-27 NOTE — ED PROVIDER NOTES
ED Provider Note    CHIEF COMPLAINT  Chief Complaint   Patient presents with   • Body Aches     body aches and bone pain     HPI  Patient is a 24-year-old female who presents emergency department for evaluation of body aches and bone pain.  She describes this as starting 1 to 2 days ago, with no specific localizing area and she does not remember any inciting actions.  She denies any fevers but does report subjective chills with the sensations of body aches all over.  She reports getting a flu vaccination this year, denies any recent traumatic injuries, notes no recent cough, headaches, vision changes, abdominal pain or bloating, dysuria or skin lesions.  Denies pregnancy    Chart review:  She was discharged in January after admission for hyperkalemia that required dialysis.  She has an impressive medical history including end-stage renal disease secondary to lupus requiring dialysis Monday/Wednesday/Friday.  She does not make urine.  No history of MI and she follows with neurology, Dr. Trent    REVIEW OF SYSTEMS  See [facility] for further details. All other systems are negative.       PAST MEDICAL HISTORY   has a past medical history of Anemia (01/17/2018), AVF (arteriovenous fistula) ([facility]), Dialysis patient (Formerly [facility]), ESRD (end stage renal disease) on dialysis ([facility]ly [facility]) (01/17/2018), Heart burn, Hypertension (01/17/2018), Indigestion, Lupus ([facility]), Migraines (01/17/2018), and Seizure ([facility]) (2013).    SOCIAL HISTORY  Social History     Tobacco Use   • Smoking status: Never Smoker   • Smokeless tobacco: Never Used   Substance and Sexual Activity   • Alcohol use: No   • Drug use: No   • Sexual activity: Not on file       SURGICAL HISTORY   has a past surgical history that includes ronak by laparoscopy (4/5/2010); av fistula creation (Right); angioplasty (01/17/2018); other; other; and gastroscopy-endo (12/9/2019).    CURRENT MEDICATIONS  Home Medications     Reviewed by Milind Lorenzo R.N. (Registered Nurse) on 02/26/20 at 1912   "Med List Status: Complete   Medication Last Dose Status   acetaminophen (TYLENOL) 500 MG Tab 2/24 Active   atenolol (TENORMIN) 25 MG Tab 2/25/2020 Active   hydroxychloroquine (PLAQUENIL) 200 MG Tab 2/25/2020 Active   mycophenolate sodium (MYFORTIC) 360 MG Tablet Delayed Response tablet 2/25/2020 Active   omeprazole (PRILOSEC OTC) 20 MG tablet 2/25/2020 Active   predniSONE (DELTASONE) 5 MG Tab 2/25/2020 Active                ALLERGIES  Allergies   Allergen Reactions   • Clindamycin Rash     Hive   • Keflex Rash     Hives   • Metoprolol Nausea       PHYSICAL EXAM  VITAL SIGNS: /82   Pulse 85   Temp 36.7 °C (98 °F) (Temporal)   Resp 18   Ht 1.702 m (5' 7\")   Wt 65.9 kg (145 lb 4.5 oz)   LMP 01/10/2020   SpO2 (!) 78%   Breastfeeding No   BMI 22.75 kg/m²   Pulse ox interpretation: I interpret this pulse ox as normal.  Genl: chronicaly ill appearing F sitting in rney comfortably, speaking clearly, appears in no acute distress   Head: NC/AT   ENT: Mucous membranes moist, posterior pharynx clear, uvula midline, nares patent bilaterally   Eyes: Normal sclera, pupils equal round reactive to light  Neck: Supple, FROM, no LAD appreciated  Pulmonary: Lungs are clear to auscultation bilaterally  Chest: No TTP  CV:  RRR, no murmur appreciated, pulses 2+ in both upper and lower extremities  Abdomen: soft, NT/ND; no rebound/guarding, no masses palpated, no HSM   : no CVA or suprapubic tenderness   Musculoskeletal: Pain free ROM of the neck. Moving upper and lower extremities and spontaneous in coordinated fashion  Neuro: A&Ox4 (person, place, time, situation), speech fluent, gait steady, no focal deficits appreciated, No cerebellar signs. Sensation is grossly intact in the distal upper and lower extremities.  5/5 strength in  and dorsiflexion/plantar flexion of the ankles  Psych: Patient has an appropriate affect and behavior  Skin: No rash or lesions.  No pallor or jaundice.  No cyanosis.  Warm and dry. "     DIAGNOSTIC STUDIES / PROCEDURES    EKG  EKG at 2007 shows: rate of 80, rhythm sinus, axis normal, intervals normal, QRS narrow, ST/T waves without acute ischemia or infarction.  No peaked T waves    LABS  Labs Reviewed   CBC WITH DIFFERENTIAL - Abnormal; Notable for the following components:       Result Value    WBC 3.8 (*)     RBC 3.36 (*)     Hemoglobin 9.1 (*)     Hematocrit 29.5 (*)     MCHC 30.8 (*)     RDW 54.4 (*)     Platelet Count 117 (*)     Neutrophils-Polys 72.10 (*)     Lymphocytes 16.40 (*)     Lymphs (Absolute) 0.63 (*)     All other components within normal limits   COMP METABOLIC PANEL - Abnormal; Notable for the following components:    Sodium 127 (*)     Chloride 85 (*)     Bun 26 (*)     Creatinine 5.32 (*)     Total Protein 9.0 (*)     Globulin 5.2 (*)     All other components within normal limits   URINALYSIS,CULTURE IF INDICATED - Abnormal; Notable for the following components:    Ph 8.5 (*)     Protein 100 (*)     Occult Blood Moderate (*)     All other components within normal limits   ESTIMATED GFR - Abnormal; Notable for the following components:    GFR If  12 (*)     GFR If Non  10 (*)     All other components within normal limits   URINE MICROSCOPIC (W/UA) - Abnormal; Notable for the following components:    RBC 2-5 (*)     All other components within normal limits   MAGNESIUM   LACTIC ACID       RADIOLOGY  DX-CHEST-PORTABLE (1 VIEW)   Final Result         1.  No acute cardiopulmonary disease.          COURSE & MEDICAL DECISION MAKING  Pertinent Labs & Imaging studies reviewed. (See chart for details)    DDX:  Hypercalcemia, hyponatremia, hypokalemia, dehydration, adverse drug reaction, metabolic disturbance, anemia, hyperglycemia    MDM  Patient presents emergency room for evaluation of body aches and chills, with initial evaluation showing no vital sign abnormalities, no fevers and no localizing tenderness.  The patient does have chronic medical  problems as described above but has not had any recent complications and describes no dizziness or any other acute complaints.  Shortly after my initial evaluation, the patient was not complaining of anything she called to request high-dose pain medications from the nurse.  The patient was reevaluated and then requested that we await the results of her labs including urinalysis to sort out the root of her acute complaints.    She has several changes on her CBC and CMP that are consistent with her chronic diseases.  She has a leukopenia that is within approximately 10% of her previous values and is in the setting of no temperature or heart rate or abnormalities.  She has an anemia that is somewhat improved from previous lab draws and a consistent thrombopenia.  She has a creatinine of 5.32 which is rather improved from her previous values and consistent hyponatremia that is not drastically different from prior work-ups.  She has no localizing urine infectious etiology and magnesium is also appropriate with a normal lactate.  I discussed these findings with the patient and discussed my apprehension and giving her pain medications for chills and there is no acute source for her body aches and chills    This is likely a viral syndrome and I would encourage her to continue adequate hydration, Tylenol as tolerated and avoid any other medication changes.  Prior to discharge, it was noted that the patient had a decreased pulse oximetry and I went to the bedside.  This is an erroneous recorded event and she is saturating at 93%, with a blood pressure of 122/82, and a pulse of 87.    The patient will not drink alcohol nor drive with prescribed medications. The patient will return for worsening symptoms and is stable at the time of discharge. The patient verbalizes understanding and will comply.      FINAL IMPRESSION  Visit Diagnoses     ICD-10-CM   1. Myalgia M79.10   2. Viral syndrome B34.9   3. Body aches R52           Electronically signed by: Manolo Whitfield M.D., 2/26/2020 7:41 PM

## 2020-03-18 ENCOUNTER — HOSPITAL ENCOUNTER (OUTPATIENT)
Dept: LAB | Facility: MEDICAL CENTER | Age: 25
End: 2020-03-18
Attending: INTERNAL MEDICINE
Payer: COMMERCIAL

## 2020-03-18 LAB
C3 SERPL-MCNC: 96 MG/DL (ref 87–200)
C4 SERPL-MCNC: 16.8 MG/DL (ref 19–52)
CRP SERPL HS-MCNC: 1.46 MG/DL (ref 0–0.75)

## 2020-03-18 PROCEDURE — 86162 COMPLEMENT TOTAL (CH50): CPT

## 2020-03-18 PROCEDURE — 85652 RBC SED RATE AUTOMATED: CPT

## 2020-03-18 PROCEDURE — 86235 NUCLEAR ANTIGEN ANTIBODY: CPT

## 2020-03-18 PROCEDURE — 86140 C-REACTIVE PROTEIN: CPT

## 2020-03-18 PROCEDURE — 86160 COMPLEMENT ANTIGEN: CPT

## 2020-03-19 LAB — ERYTHROCYTE [SEDIMENTATION RATE] IN BLOOD BY WESTERGREN METHOD: 88 MM/HOUR (ref 0–20)

## 2020-03-20 LAB
CH50 SERPL-ACNC: 126 CAE UNITS (ref 60–144)
ENA SCL70 IGG SER QL: 3 AU/ML (ref 0–40)

## 2020-03-26 NOTE — OR NURSING
COVID-19 Pre-surgery screenin. Do you have an undiagnosed respiratory illness or symptoms such as coughing or sneezing? /No)  a. Onset of Sx  b. Acute vs. chronic respiratory illness    2. Do you have an unexplained fever greater than 100.4 degrees Fahrenheit or 38 degrees Celsius?     No)    3. Have you had direct exposure to a patient who tested positive for Covid-19?    no)    4. Have you traveled within the last 14 days to [place], [place], [place], [place], or [place]?    /No)

## 2020-03-27 ENCOUNTER — HOSPITAL ENCOUNTER (OUTPATIENT)
Facility: MEDICAL CENTER | Age: 25
End: 2020-03-27
Attending: SURGERY | Admitting: SURGERY
Payer: COMMERCIAL

## 2020-03-27 ENCOUNTER — APPOINTMENT (OUTPATIENT)
Dept: RADIOLOGY | Facility: MEDICAL CENTER | Age: 25
End: 2020-03-27
Attending: SURGERY
Payer: COMMERCIAL

## 2020-03-27 VITALS
OXYGEN SATURATION: 98 % | SYSTOLIC BLOOD PRESSURE: 148 MMHG | TEMPERATURE: 97.1 F | RESPIRATION RATE: 16 BRPM | HEART RATE: 85 BPM | BODY MASS INDEX: 22.63 KG/M2 | DIASTOLIC BLOOD PRESSURE: 96 MMHG | HEIGHT: 67 IN | WEIGHT: 144.18 LBS

## 2020-03-27 DIAGNOSIS — Z99.2 DEPENDENCE ON RENAL DIALYSIS (HCC): ICD-10-CM

## 2020-03-27 DIAGNOSIS — T82.858D STENOSIS OF OTHER VASCULAR PROSTHETIC DEVICES, IMPLANTS AND GRAFTS, SUBSEQUENT ENCOUNTER: ICD-10-CM

## 2020-03-27 LAB
ANION GAP SERPL CALC-SCNC: 14 MMOL/L (ref 7–16)
BASOPHILS # BLD AUTO: 0.5 % (ref 0–1.8)
BASOPHILS # BLD: 0.02 K/UL (ref 0–0.12)
BUN SERPL-MCNC: 49 MG/DL (ref 8–22)
CALCIUM SERPL-MCNC: 9.4 MG/DL (ref 8.5–10.5)
CHLORIDE SERPL-SCNC: 94 MMOL/L (ref 96–112)
CO2 SERPL-SCNC: 26 MMOL/L (ref 20–33)
COMMENT 1642: NORMAL
CREAT SERPL-MCNC: 8.57 MG/DL (ref 0.5–1.4)
EOSINOPHIL # BLD AUTO: 0.15 K/UL (ref 0–0.51)
EOSINOPHIL NFR BLD: 3.8 % (ref 0–6.9)
ERYTHROCYTE [DISTWIDTH] IN BLOOD BY AUTOMATED COUNT: 54.8 FL (ref 35.9–50)
GLUCOSE SERPL-MCNC: 78 MG/DL (ref 65–99)
HCG SERPL QL: NEGATIVE
HCT VFR BLD AUTO: 24.1 % (ref 37–47)
HGB BLD-MCNC: 7.1 G/DL (ref 12–16)
HYPOCHROMIA BLD QL SMEAR: ABNORMAL
IMM GRANULOCYTES # BLD AUTO: 0.03 K/UL (ref 0–0.11)
IMM GRANULOCYTES NFR BLD AUTO: 0.8 % (ref 0–0.9)
LYMPHOCYTES # BLD AUTO: 0.8 K/UL (ref 1–4.8)
LYMPHOCYTES NFR BLD: 20.1 % (ref 22–41)
MACROCYTES BLD QL SMEAR: ABNORMAL
MCH RBC QN AUTO: 26 PG (ref 27–33)
MCHC RBC AUTO-ENTMCNC: 29.5 G/DL (ref 33.6–35)
MCV RBC AUTO: 88.3 FL (ref 81.4–97.8)
MONOCYTES # BLD AUTO: 0.33 K/UL (ref 0–0.85)
MONOCYTES NFR BLD AUTO: 8.3 % (ref 0–13.4)
MORPHOLOGY BLD-IMP: NORMAL
NEUTROPHILS # BLD AUTO: 2.66 K/UL (ref 2–7.15)
NEUTROPHILS NFR BLD: 66.5 % (ref 44–72)
NRBC # BLD AUTO: 0 K/UL
NRBC BLD-RTO: 0 /100 WBC
OVALOCYTES BLD QL SMEAR: NORMAL
PLATELET # BLD AUTO: 115 K/UL (ref 164–446)
PLATELET BLD QL SMEAR: NORMAL
PMV BLD AUTO: 10.1 FL (ref 9–12.9)
POIKILOCYTOSIS BLD QL SMEAR: NORMAL
POLYCHROMASIA BLD QL SMEAR: NORMAL
POTASSIUM SERPL-SCNC: 5.2 MMOL/L (ref 3.6–5.5)
RBC # BLD AUTO: 2.73 M/UL (ref 4.2–5.4)
RBC BLD AUTO: PRESENT
SODIUM SERPL-SCNC: 134 MMOL/L (ref 135–145)
WBC # BLD AUTO: 4 K/UL (ref 4.8–10.8)

## 2020-03-27 PROCEDURE — 700117 HCHG RX CONTRAST REV CODE 255: Performed by: SURGERY

## 2020-03-27 PROCEDURE — 84703 CHORIONIC GONADOTROPIN ASSAY: CPT

## 2020-03-27 PROCEDURE — 37249 TRLUML BALO ANGIOP ADDL VEIN: CPT

## 2020-03-27 PROCEDURE — 700111 HCHG RX REV CODE 636 W/ 250 OVERRIDE (IP)

## 2020-03-27 PROCEDURE — 80048 BASIC METABOLIC PNL TOTAL CA: CPT

## 2020-03-27 PROCEDURE — 85025 COMPLETE CBC W/AUTO DIFF WBC: CPT

## 2020-03-27 PROCEDURE — 700111 HCHG RX REV CODE 636 W/ 250 OVERRIDE (IP): Performed by: SURGERY

## 2020-03-27 PROCEDURE — 99153 MOD SED SAME PHYS/QHP EA: CPT

## 2020-03-27 PROCEDURE — 160002 HCHG RECOVERY MINUTES (STAT)

## 2020-03-27 PROCEDURE — 700105 HCHG RX REV CODE 258: Performed by: SURGERY

## 2020-03-27 RX ORDER — SODIUM CHLORIDE 9 MG/ML
INJECTION, SOLUTION INTRAVENOUS ONCE
Status: COMPLETED | OUTPATIENT
Start: 2020-03-27 | End: 2020-03-27

## 2020-03-27 RX ORDER — HEPARIN SODIUM,PORCINE 1000/ML
VIAL (ML) INJECTION
Status: COMPLETED
Start: 2020-03-27 | End: 2020-03-27

## 2020-03-27 RX ORDER — AMLODIPINE BESYLATE 10 MG/1
10 TABLET ORAL EVERY EVENING
Status: ON HOLD | COMMUNITY
Start: 2020-03-17 | End: 2021-03-04 | Stop reason: SDUPTHER

## 2020-03-27 RX ORDER — IODIXANOL 270 MG/ML
30 INJECTION, SOLUTION INTRAVASCULAR ONCE
Status: COMPLETED | OUTPATIENT
Start: 2020-03-27 | End: 2020-03-27

## 2020-03-27 RX ORDER — SODIUM CHLORIDE 9 MG/ML
500 INJECTION, SOLUTION INTRAVENOUS
Status: DISCONTINUED | OUTPATIENT
Start: 2020-03-27 | End: 2020-03-27 | Stop reason: HOSPADM

## 2020-03-27 RX ORDER — HEPARIN SODIUM 1000 [USP'U]/ML
3000 INJECTION, SOLUTION INTRAVENOUS; SUBCUTANEOUS ONCE
Status: COMPLETED | OUTPATIENT
Start: 2020-03-27 | End: 2020-03-27

## 2020-03-27 RX ORDER — MIDAZOLAM HYDROCHLORIDE 1 MG/ML
.5-2 INJECTION INTRAMUSCULAR; INTRAVENOUS PRN
Status: DISCONTINUED | OUTPATIENT
Start: 2020-03-27 | End: 2020-03-27 | Stop reason: HOSPADM

## 2020-03-27 RX ORDER — MIDAZOLAM HYDROCHLORIDE 1 MG/ML
INJECTION INTRAMUSCULAR; INTRAVENOUS
Status: COMPLETED
Start: 2020-03-27 | End: 2020-03-27

## 2020-03-27 RX ADMIN — HEPARIN SODIUM 3000 UNITS: 1000 INJECTION, SOLUTION INTRAVENOUS; SUBCUTANEOUS at 10:27

## 2020-03-27 RX ADMIN — FENTANYL CITRATE 25 MCG: 50 INJECTION, SOLUTION INTRAMUSCULAR; INTRAVENOUS at 10:12

## 2020-03-27 RX ADMIN — MIDAZOLAM HYDROCHLORIDE 1 MG: 1 INJECTION, SOLUTION INTRAMUSCULAR; INTRAVENOUS at 10:05

## 2020-03-27 RX ADMIN — FENTANYL CITRATE 25 MCG: 50 INJECTION, SOLUTION INTRAMUSCULAR; INTRAVENOUS at 10:34

## 2020-03-27 RX ADMIN — IODIXANOL 30 ML: 270 INJECTION, SOLUTION INTRAVASCULAR at 11:19

## 2020-03-27 RX ADMIN — FENTANYL CITRATE 25 MCG: 50 INJECTION, SOLUTION INTRAMUSCULAR; INTRAVENOUS at 10:04

## 2020-03-27 RX ADMIN — SODIUM CHLORIDE: 9 INJECTION, SOLUTION INTRAVENOUS at 08:36

## 2020-03-27 RX ADMIN — MIDAZOLAM HYDROCHLORIDE 2 MG: 1 INJECTION, SOLUTION INTRAMUSCULAR; INTRAVENOUS at 10:27

## 2020-03-27 RX ADMIN — MIDAZOLAM HYDROCHLORIDE 1 MG: 1 INJECTION, SOLUTION INTRAMUSCULAR; INTRAVENOUS at 10:12

## 2020-03-27 RX ADMIN — FENTANYL CITRATE 25 MCG: 50 INJECTION, SOLUTION INTRAMUSCULAR; INTRAVENOUS at 10:39

## 2020-03-27 RX ADMIN — FENTANYL CITRATE 50 MCG: 50 INJECTION, SOLUTION INTRAMUSCULAR; INTRAVENOUS at 10:49

## 2020-03-27 ASSESSMENT — FIBROSIS 4 INDEX: FIB4 SCORE: 1.3

## 2020-03-27 NOTE — OR NURSING
0830 Labs drawn with PIV start and sent to lab. CARLENE fistula has bruit/thrill present. Non-pitting 1+ edema on right hand.

## 2020-03-27 NOTE — OP REPORT
OPERATIVE REPORT      Patient:  Lily Nicole     Procedure Date:  03/27/20    Indication:  Poorly-functioning RUE AVF    Pre-Operative Diagnosis:  ESRD  Poorly-functioning RUE AVF  Central venous stenosis    Post-Operative Diagnosis:  Same    Procedure(s):   Diagnostics  US guided access of RUE AVF  RUE fistulogram     Intervention(s)  Angioplasty of right subclavian vein (central dialysis segment)  Angioplasty of cephalic arch (peripheral dialysis segment)    Moderate Sedation, 90 minutes      Surgeon:  Renan Dotson M.D.    Assistant:  None    Anesthesia:  Moderate Sedation    EBL:  5cc    Radiation:  15.4 mGy    Contrast:  30cc    Specimen:  None    Complications:  None    Findings:  RUE fistulogram showed wide patency of the access in the arm. There was 50% stenosis at the confluence of the cephalic arch and long-segment high-grade stenosis of the subclavian vein. The innominate is patent. I did not have access to a dialysis circuit stent. Therefore, the lesion in the cephalic arch and subclavian vein was initially treated with an 8mm cutting ballon followed by a 10mm balloon. Completion imaging showed significant improvement in flow through the central circulation.    Procedure:  Informed consent was obtained from the patient in the pre-operative holding area. All risks, benefits, and alternatives were explained and she elected to proceed. The patient was transported to the interventional suite and placed on the table in a supine position. A time-out was performed verifying the correct site of surgery. The patient was prepped and draped in the usual sterile fashion.    A trained nurse acting under my direct supervision administered conscious sedation using fentanyl and versed. Pulse oximetry and continuous EKG tracings were monitored throughout the case. Total sedation time was 90 minutes.    I began by accessing the right brachiocephalic AVF under ultrasound visualization. The fistula  was patent, the needle was visualized entering the access and images were saved to the medical record.    A micropunture sheath was inserted and right upper extremity fistulogram was performed with findings noted above.    I upsized to a 7 Bulgarian sheath and advanced a wire and catheter into the IVC.    The right subclavian vein and cephalic arch were first angioplastied with an 8mm cutting balloon, the largest available to me. The subclavian vein was then treated with a 10mm balloon which was large relative to the patient's diminutive stature.    Imaging showed significant improvement in the subclavian stenosis, however it still appeared to be at least 30%. The vein was treated again with the 10mm balloon. Completion imaging showed rapid flow through the central circulation although mild reflux persists.     The wire and sheath were removed. Manual pressure was held for several minutes. An occlusive dressing was applied. The patient was recovered from sedation and transferred to recovery in stable condition with no apparent complications.    eRnan Dotson M.D.  Vascular Surgeon  [facility]

## 2020-03-27 NOTE — DISCHARGE INSTRUCTIONS
ACTIVITY: Rest and take it easy for the first 24 hours.  A responsible adult is recommended to remain with you during that time.  It is normal to feel sleepy.  We encourage you to not do anything that requires balance, judgment or coordination.    MILD FLU-LIKE SYMPTOMS ARE NORMAL. YOU MAY EXPERIENCE GENERALIZED MUSCLE ACHES, THROAT IRRITATION, HEADACHE AND/OR SOME NAUSEA.    FOR 24 HOURS DO NOT:  Drive, operate machinery or run household appliances.  Drink beer or alcoholic beverages.   Make important decisions or sign legal documents.    SPECIAL INSTRUCTIONS:   Minimal activity at home for 2 days.   No Pushing, pulling, or heavy lifting (10 LBS) for 2 weeks.  Keep dressings on for 2 days, then remove.    Keep incisions dry and clean.   Okay to shower after 2 days.    No bath for 10 days    Fistulogram  Introduction  A fistulogram is an X-ray test to look inside the site where your blood is removed and returned to your body during dialysis (dialysis fistula). Your fistula allows easy and effective access, but sometimes problems can occur, such as narrowing or blockages. A narrowing or blockage can reduce the effectiveness of dialysis. A fistulogram helps to detect these problems. It also lets your health care provider know of any additional treatment you may need.  Tell a health care provider about:  · Any allergies you have.  · All medicines you are taking, including vitamins, herbs, eye drops, creams, and over-the-counter medicines.  · Any problems you or family members have had with anesthetic medicines.  · Any reactions you have had to contrast dye.  · Any blood disorders you have.  · Any surgeries you have had.  · Any medical conditions you have.  · Pain, redness, or swelling in your limb with the dialysis fistula.  · Any bleeding from your dialysis fistula.  · Any of the following in the part of your body where the dialysis fistula leads:  ¨ Numbness.  ¨ Tingling.  ¨ Cold feeling.  ¨ Bluish or pale white in  color.  What are the risks?  Generally, a fistulogram is a safe procedure. However, problems can occur and include:  · Bleeding.  · Infection.  · A blood clot in the dialysis fistula.  · A blood clot that travels to your lungs (pulmonary embolism).  What happens during the procedure?  · A needle will be inserted into your arm. It will be used to give you medicine. Medicines given may include:  ¨ Numbing medicine (local anesthetic) to numb an area on the limb with your fistula.  ¨ A medicine to help you relax (sedative).  ¨ A medicine that makes you fall asleep (general anesthetic).  · When your skin is numb, a needle will be inserted into your vein.  · A thin, flexible tube (catheter) will be inserted into the needle and guided to the narrowed area.  · Dye (contrast material) will be injected into the catheter. As the contrast material passes through your body, you may feel warm.  · X-rays will be taken. The contrast material will show where any narrowing or blockages are.  · A guide wire and balloon-tipped catheter will be advanced to the blockage site.  · The balloon will be inflated for a short period of time. The balloon may be inflated several times at this site, or the balloon may be moved to other sites.  · If your health care provider determines the clot will be best treated by a clot-dissolving medicine (thrombolysis), this medicine can be delivered through the catheter instead of using the balloon.  · A mechanical device at the end of the catheter can also be used to break up the clot (thrombectomy).  · At the end of the procedure, the catheter will be removed.  · In some cases, the catheter site will be closed with a stitch or two.  · Pressure will be applied to stop any bleeding, and your skin will be covered with a bandage (dressing).  What happens after the procedure?  · You will stay in a recovery area until the medicines have worn off.  · You will stay in bed for several hours.  · As you begin to  feel better, you will be offered ice and beverages. You may be given food.  · Your health care provider will check your blood pressure and pulse and watch your access site.  · When you can walk, drink, eat, and use the bathroom, you may be discharged.    Discharge Instructions for Peripheral Angioplasty  You had a procedure known as peripheral angioplasty. Peripheral arteries deliver blood to your legs and feet. Over time, your artery walls may thicken and build up with a fatty substance (plaque). As plaque builds up in an artery, blood flow can be reduced or even blocked. This causes peripheral artery disease and problems in your legs and feet. Peripheral angioplasty is a procedure that helps open blockages in peripheral arteries.  Home care  · Don’t drive for 2 to 3 days after the procedure or if you are taking opioid pain medicines.  · Rest for 2 to 3 days after the procedure. You will likely be able to go back to your normal activity within a few days.  · Don’t lift anything heavier than 10 pounds for 14 days.  · Take your temperature and check your incision site for signs of infection (redness, swelling, drainage, or warmth) every day for a week.  · Keep a dressing on the incision site for at least 24 hours, or as directed by your doctor.  · Take your medicines exactly as directed. Don’t skip doses.  · You can shower the day after the procedure.  · Unless directed otherwise, drink 6 to 8 glasses of water a day. This can prevent dehydration. It can also flush the dye used during your procedure out of your body. (Talk to your doctor first if you are on dialysis or have heart failure.)  · Eat a healthy diet that is low in fat, salt, and cholesterol. Ask your doctor for menus and other diet information.    When to call your healthcare provider  Call your provider right away if you have any of the following:  · Fever of 100.4°F (38°C), or higher, or as directed by your provider  · Signs of infection at the incision  site (redness, swelling, or warmth)  · Drainage from your incision  · Changes in color, temperature, feeling, or movement in either foot or leg  · Constant or increasing pain or numbness in your leg  · Leg swelling that doesn't improve overnight  · Bleeding, bruising, or a large swelling where the catheter was inserted  · Blood in your urine  · Black or tarry stools  · Dizziness or shortness of breath       DIET: To avoid nausea, slowly advance diet as tolerated, avoiding spicy or greasy foods for the first day.  Add more substantial food to your diet according to your physician's instructions.  INCREASE FLUIDS AND FIBER TO AVOID CONSTIPATION.    SURGICAL DRESSING/BATHING:   Keep dressings on for 2 days, then remove.  Okay to shower after 2 days (On 3/29 after 1 pm)    FOLLOW-UP APPOINTMENT:  A follow-up appointment should be arranged with your doctor; call to schedule.    You should CALL YOUR PHYSICIAN if you develop:  Fever greater than 101 degrees F.  Pain not relieved by medication, or persistent nausea or vomiting.  Excessive bleeding (blood soaking through dressing) or unexpected drainage from the wound.  Extreme redness or swelling around the incision site, drainage of pus or foul smelling drainage.  Inability to urinate or empty your bladder within 8 hours.  Problems with breathing or chest pain.    You should call 911 if you develop problems with breathing or chest pain.  If you are unable to contact your doctor or surgical center, you should go to the nearest emergency room or urgent care center.    Physician's telephone #: [phone]    If any questions arise, call your doctor.  If your doctor is not available, please feel free to call the Surgical Center at [phone].  The Center is open Monday through Friday from 7AM to 7PM.  You can also call the [facility] HOTLINE open 24 hours/day, 7 days/week and speak to a nurse at [phone], or toll free at [phone].    A registered nurse may call you  a few days after your surgery to see how you are doing after your procedure.    MEDICATIONS: Resume taking daily medication.  Take prescribed pain medication with food.  If no medication is prescribed, you may take non-aspirin pain medication if needed.  PAIN MEDICATION CAN BE VERY CONSTIPATING.  Take a stool softener or laxative such as senokot, pericolace, or milk of magnesia if needed.    If your physician has prescribed pain medication that includes Acetaminophen (Tylenol), do not take additional Acetaminophen (Tylenol) while taking the prescribed medication.    Depression / Suicide Risk    As you are discharged from this [facility] facility, it is important to learn how to keep safe from harming yourself.    Recognize the warning signs:  · Abrupt changes in personality, positive or negative- including increase in energy   · Giving away possessions  · Change in eating patterns- significant weight changes-  positive or negative  · Change in sleeping patterns- unable to sleep or sleeping all the time   · Unwillingness or inability to communicate  · Depression  · Unusual sadness, discouragement and loneliness  · Talk of wanting to die  · Neglect of personal appearance   · Rebelliousness- reckless behavior  · Withdrawal from people/activities they love  · Confusion- inability to concentrate     If you or a loved one observes any of these behaviors or has concerns about self-harm, here's what you can do:  · Talk about it- your feelings and reasons for harming yourself  · Remove any means that you might use to hurt yourself (examples: pills, rope, extension cords, firearm)  · Get professional help from the community (Mental Health, Substance Abuse, psychological counseling)  · Do not be alone:Call your Safe Contact- someone whom you trust who will be there for you.  · Call your local CRISIS HOTLINE 995-4860 or 108-042-0124  · Call your local Children's Mobile Crisis Response Team [place] (152) 138-9087 or  www.YuuConnect.Instant BioScan  · Call the toll free National Suicide Prevention Hotlines   · National Suicide Prevention Lifeline 721-922-KPZC (5636)  · National Hope Line Network 800-SUICIDE (440-2562)

## 2020-03-27 NOTE — PROGRESS NOTES
IR RN note:    Site Marked and Confirmed with MD, patient and RN pre procedure  Consents signed by patient, MD, and RN pre medication   MD verbal order to sedate patient to desired sedation level for procedure with medication ranges in MAR        RIGHT ARM FISTULOGRAM WITH INTERVENTIONS by MD Dotson assisted by RT Steele,Right arm fistula access site;  End Tidal CO2 range 34-39 during procedure   RUE Angiogram completed  Angioplasty in RUE Cephalic arch and Subclavian Vein   Right arm sealed with manual pressure, Tegaderm and gauze   RUE +2 pulses pre procedure   RUE +2 pulses post procedure   Patient tolerated procedure, hemodynamically stable; pt awake, lethargic and talking post procedure; report given to PPU MICHELLE Espinosa;   [facility] monitored then transferred care to report RN

## 2020-03-27 NOTE — OR NURSING
1121 Pt over from IR post Right Arm Fistulogram. R upper arm with dressing in place, CDI and soft, no signs of bleeding. + thrill and + bruit. Pt awake and alert, denies nausea. Complaining of some pain at surgery site, heat provided per pt request. VSS.  1145 Tolerating orals  1200 Pt states pain is improved  1215 Pt ambulated to restroom, tolerated well  1225 Criteria met, R upper arm with dressing in place, CDI and soft, no signs of bleeding.  1230 Pt wheeled off of unit with all belongings to meet her mother, without incident.   1235 Discharge instructions provided to pt and mother. Discussed diet, activity, follow up, prescriptions and symptom management. Pt and her mother state understanding. Pt and her mother state all questions have been answered. Copy of discharge provided to pt.  1240 Pt wheeled to car without incident.

## 2020-04-13 ENCOUNTER — TELEPHONE (OUTPATIENT)
Dept: CARDIOLOGY | Facility: MEDICAL CENTER | Age: 25
End: 2020-04-13

## 2020-04-13 NOTE — TELEPHONE ENCOUNTER
1. Caller Name: Lily Nicole                          Call Back Number: [phone]    [facility] PCP or Specialty Provider: Nicole Matthew        2.  Does patient have any active symptoms of respiratory illness (fever OR cough OR shortness of breath OR sore throat)? No.    3.  Does patient have any comoribidities? ESRD    4.  Has the patient traveled in the last 14 days OR had any known contact with someone who is suspected or confirmed to have COVID-19?  No.    5. Disposition: Cleared by RN Triage; OK to keep/schedule appointment    Note routed to [facility] Provider: ZAINA only.

## 2020-04-14 ENCOUNTER — APPOINTMENT (OUTPATIENT)
Dept: CARDIOLOGY | Facility: MEDICAL CENTER | Age: 25
End: 2020-04-14
Payer: COMMERCIAL

## 2020-04-21 ENCOUNTER — NON-PROVIDER VISIT (OUTPATIENT)
Dept: CARDIOLOGY | Facility: MEDICAL CENTER | Age: 25
End: 2020-04-21
Payer: COMMERCIAL

## 2020-04-21 VITALS
HEIGHT: 67 IN | HEART RATE: 87 BPM | SYSTOLIC BLOOD PRESSURE: 128 MMHG | OXYGEN SATURATION: 99 % | WEIGHT: 144 LBS | DIASTOLIC BLOOD PRESSURE: 92 MMHG | BODY MASS INDEX: 22.6 KG/M2

## 2020-04-21 DIAGNOSIS — N18.6 ESRD (END STAGE RENAL DISEASE) ON DIALYSIS (HCC): ICD-10-CM

## 2020-04-21 DIAGNOSIS — Z01.818 PRE-TRANSPLANT EVALUATION FOR CKD (CHRONIC KIDNEY DISEASE): ICD-10-CM

## 2020-04-21 DIAGNOSIS — Z99.2 ESRD (END STAGE RENAL DISEASE) ON DIALYSIS (HCC): ICD-10-CM

## 2020-04-21 DIAGNOSIS — M32.9 LUPUS (HCC): ICD-10-CM

## 2020-04-21 DIAGNOSIS — Z01.810 PREOPERATIVE CARDIOVASCULAR EXAMINATION: ICD-10-CM

## 2020-04-21 LAB — TREADMILL STRESS: NORMAL

## 2020-04-21 PROCEDURE — 93015 CV STRESS TEST SUPVJ I&R: CPT | Performed by: INTERNAL MEDICINE

## 2020-04-21 ASSESSMENT — FIBROSIS 4 INDEX: FIB4 SCORE: 1.32664991614215994

## 2020-04-22 ENCOUNTER — TELEMEDICINE (OUTPATIENT)
Dept: CARDIOLOGY | Facility: MEDICAL CENTER | Age: 25
End: 2020-04-22
Payer: COMMERCIAL

## 2020-04-22 ENCOUNTER — TELEPHONE (OUTPATIENT)
Dept: CARDIOLOGY | Facility: MEDICAL CENTER | Age: 25
End: 2020-04-22

## 2020-04-22 VITALS
BODY MASS INDEX: 22.49 KG/M2 | DIASTOLIC BLOOD PRESSURE: 65 MMHG | SYSTOLIC BLOOD PRESSURE: 125 MMHG | WEIGHT: 143.3 LBS | HEIGHT: 67 IN

## 2020-04-22 DIAGNOSIS — R07.9 CHEST PAIN, UNSPECIFIED TYPE: ICD-10-CM

## 2020-04-22 DIAGNOSIS — Z01.818 PREOPERATIVE CLEARANCE: ICD-10-CM

## 2020-04-22 DIAGNOSIS — Z99.2 ESRD (END STAGE RENAL DISEASE) ON DIALYSIS (HCC): ICD-10-CM

## 2020-04-22 DIAGNOSIS — I10 ESSENTIAL HYPERTENSION: ICD-10-CM

## 2020-04-22 DIAGNOSIS — N18.6 ESRD (END STAGE RENAL DISEASE) ON DIALYSIS (HCC): ICD-10-CM

## 2020-04-22 PROCEDURE — 99203 OFFICE O/P NEW LOW 30 MIN: CPT | Mod: 95,CR | Performed by: INTERNAL MEDICINE

## 2020-04-22 ASSESSMENT — ENCOUNTER SYMPTOMS
COUGH: 0
BLURRED VISION: 0
IRREGULAR HEARTBEAT: 0
DECREASED APPETITE: 0
ORTHOPNEA: 0
WEIGHT GAIN: 0
BACK PAIN: 0
HEARTBURN: 0
DIARRHEA: 0
PALPITATIONS: 0
SYNCOPE: 0
CLAUDICATION: 0
ABDOMINAL PAIN: 0
SHORTNESS OF BREATH: 0
DYSPNEA ON EXERTION: 0
FEVER: 0
CONSTIPATION: 0
ALTERED MENTAL STATUS: 0
DIZZINESS: 0
WEIGHT LOSS: 0
FLANK PAIN: 0
VOMITING: 0
NEAR-SYNCOPE: 0
DEPRESSION: 0
NAUSEA: 0
PND: 0

## 2020-04-22 ASSESSMENT — FIBROSIS 4 INDEX: FIB4 SCORE: 1.32664991614215994

## 2020-04-22 NOTE — TELEPHONE ENCOUNTER
Returned call. Pt is pending renal transplant, and she wanted to f/u on what testing has been completed. Informed that echo was done on 1/26/20, and treadmill stress test was done yesterday. CTA heart is ordered but not scheduled. Also provided with fax number for our medical records number as she requested.

## 2020-04-22 NOTE — PROGRESS NOTES
This encounter was conducted via ZOOM.  Verbal consent was obtained. Patient's identity was verified.    Cardiology Note    Chief Complaint   Patient presents with   • Hypertension       History of Present Illness: Lily Nicole is a 24 y.o. female PMH SLE, ESRD on HD 2015, AVF who presents for initial visit.    Presents for preoperative evaluation for renal transplant. Underwent stress test, but was unable to complete due to poor exercise tolerance. Listed for kidney transplant.     States was admitted for chest pain 1/2020, found elevated troponin, but normal echo and resolved symptoms after dialysis and was thought to be demand type 2 nstemi related.  No recurrent chest pain since.    Currently, able to walk about two miles in about 40 minutes. When doing this denies cardiac symptoms.     Review of Systems   Constitution: Negative for decreased appetite, fever, malaise/fatigue, weight gain and weight loss.   HENT: Negative for congestion and nosebleeds.    Eyes: Negative for blurred vision.   Cardiovascular: Negative for chest pain, claudication, dyspnea on exertion, irregular heartbeat, leg swelling, near-syncope, orthopnea, palpitations, paroxysmal nocturnal dyspnea and syncope.   Respiratory: Negative for cough and shortness of breath.    Endocrine: Negative for cold intolerance and heat intolerance.   Skin: Negative for rash.   Musculoskeletal: Negative for back pain.   Gastrointestinal: Negative for abdominal pain, constipation, diarrhea, heartburn, melena, nausea and vomiting.   Genitourinary: Negative for dysuria, flank pain and hematuria.   Neurological: Negative for dizziness.   Psychiatric/Behavioral: Negative for altered mental status and depression.         Past Medical History:   Diagnosis Date   • Anemia 01/17/2018   • AVF (arteriovenous fistula) ([facility])     Right Arm   • Dialysis patient ([facility])      dialysis, M,W,F Elizabeth/Joni   • ESRD (end stage renal disease) on dialysis ([facility]) 01/17/2018     "Twan Fu   • Heart burn    • Hypertension 01/17/2018    \"Controlled with medication\"   • Indigestion    • Lupus (HCC)    • Migraines 01/17/2018   • Seizure (HCC) 2013    from high blood pressure, reports 1 time event         Past Surgical History:   Procedure Laterality Date   • GASTROSCOPY-ENDO  12/9/2019    Procedure: GASTROSCOPY;  Surgeon: Aaron Kam M.D.;  Location: SURGERY [facility];  Service: Gastroenterology   • ANGIOPLASTY  01/17/2018    \"Right Arm AV-Fistulagram & Angioplastyx3\"   • ULI BY LAPAROSCOPY  4/5/2010    Performed by SYED MARTELL at SURGERY [facility]   • AV FISTULA CREATION Right    • OTHER      renal biopsy x 3   • OTHER      bone marrow biopsy         Current Outpatient Medications   Medication Sig Dispense Refill   • amLODIPine (NORVASC) 10 MG Tab Take 10 mg by mouth every evening.     • atenolol (TENORMIN) 25 MG Tab Take 25 mg by mouth every evening.     • hydroxychloroquine (PLAQUENIL) 200 MG Tab Take 200 mg by mouth every evening.     • acetaminophen (TYLENOL) 500 MG Tab Take 500-1,000 mg by mouth every 6 hours as needed for Moderate Pain.     • omeprazole (PRILOSEC OTC) 20 MG tablet Take 1 Tab by mouth 2 times a day. Indications: Heartburn 30 Tab 0   • predniSONE (DELTASONE) 5 MG Tab Take 5 mg by mouth every evening.     • mycophenolate sodium (MYFORTIC) 360 MG Tablet Delayed Response tablet Take 360 mg by mouth every evening.       No current facility-administered medications for this visit.          Allergies   Allergen Reactions   • Clindamycin Rash     Hive   • Keflex Rash     Hives   • Metoprolol Nausea         No family history on file.      Social History     Socioeconomic History   • Marital status: Single     Spouse name: Not on file   • Number of children: Not on file   • Years of education: Not on file   • Highest education level: Not on file   Occupational History   • Not on file   Social Needs   • Financial resource strain: Not on file   • Food " "insecurity     Worry: Not on file     Inability: Not on file   • Transportation needs     Medical: Not on file     Non-medical: Not on file   Tobacco Use   • Smoking status: Never Smoker   • Smokeless tobacco: Never Used   Substance and Sexual Activity   • Alcohol use: No   • Drug use: No   • Sexual activity: Not on file   Lifestyle   • Physical activity     Days per week: Not on file     Minutes per session: Not on file   • Stress: Not on file   Relationships   • Social connections     Talks on phone: Not on file     Gets together: Not on file     Attends Spiritism service: Not on file     Active member of club or organization: Not on file     Attends meetings of clubs or organizations: Not on file     Relationship status: Not on file   • Intimate partner violence     Fear of current or ex partner: Not on file     Emotionally abused: Not on file     Physically abused: Not on file     Forced sexual activity: Not on file   Other Topics Concern   • Not on file   Social History Narrative   • Not on file         Physical Exam:  Ambulatory Vitals  /65 (BP Location: Left arm)   Ht 1.702 m (5' 7\")   Wt 65 kg (143 lb 4.8 oz)    BP Readings from Last 4 Encounters:   04/22/20 125/65   04/21/20 128/92   03/27/20 148/96   02/26/20 122/82     Weight/BMI:   Vitals:    04/22/20 1320   BP: 125/65   Weight: 65 kg (143 lb 4.8 oz)   Height: 1.702 m (5' 7\")    Body mass index is 22.44 kg/m².  Wt Readings from Last 4 Encounters:   04/22/20 65 kg (143 lb 4.8 oz)   04/21/20 65.3 kg (144 lb)   03/27/20 65.4 kg (144 lb 2.9 oz)   02/26/20 65.9 kg (145 lb 4.5 oz)     Physical Exam:  Constitutional: Alert, no distress, well-groomed.  Skin: No rashes in visible areas.  Eye: Round. Conjunctiva clear, lids normal. No icterus.   ENMT: Lips pink without lesions, good dentition, moist mucous membranes. Phonation normal.  Neck: No masses, no thyromegaly. Moves freely without pain.  CV: Pulse as reported by patient  Respiratory: Unlabored " A/P: 72y Female s/p Laparoscopic bilateral salpingo-oophorectomy ebl 10 respiratory effort, no cough or audible wheeze  Psych: Alert and oriented x3, normal affect and mood.     Lab Data Review:  Lab Results   Component Value Date/Time    CHOLSTRLTOT 46 (L) 01/05/2011 05:30 PM    LDL 14 01/05/2011 04:15 AM    HDL 16 (L) 01/05/2011 04:15 AM    TRIGLYCERIDE 64 01/05/2011 05:30 PM       Lab Results   Component Value Date/Time    SODIUM 134 (L) 03/27/2020 08:30 AM    POTASSIUM 5.2 03/27/2020 08:30 AM    CHLORIDE 94 (L) 03/27/2020 08:30 AM    CO2 26 03/27/2020 08:30 AM    GLUCOSE 78 03/27/2020 08:30 AM    BUN 49 (H) 03/27/2020 08:30 AM    CREATININE 8.57 (HH) 03/27/2020 08:30 AM     CrCl cannot be calculated (Patient's most recent lab result is older than the maximum 7 days allowed.).  Lab Results   Component Value Date/Time    ALKPHOSPHAT 56 02/26/2020 08:21 PM    ASTSGOT 23 02/26/2020 08:21 PM    ALTSGPT 11 02/26/2020 08:21 PM    TBILIRUBIN 0.3 02/26/2020 08:21 PM      Lab Results   Component Value Date/Time    WBC 4.0 (L) 03/27/2020 08:30 AM     Lab Results   Component Value Date/Time    HBA1C 4.5 10/15/2019 01:00 PM     No components found for: TROP      Cardiac Imaging and Procedures Review:      TTE 1/2020  CONCLUSIONS  Prior echo 7/9/18; compared to the report of the study done - there has   been no significant change.   Normal left ventricular systolic function.  Left ventricular ejection fraction is visually estimated to be 60%.  Mild tricuspid regurgitation.  Estimated right ventricular systolic pressure is 25 mmHg.  No evidence of right to left shunt by agitated saline challenge.    Treadmill EKG 4/21/20  Patient exercised for 5 minutes, 50 seconds, and 7 METs.  100% of MPHR: 198  90% of MPHR: 178  85% of MPHR: 168  Peak Heart Rate Achieved: 140  70 % of MPHR.  Maximum /80 mmHg  Functional aerobic impairment: (40) Sedentary  Provider conclusions and analysis:  Borderline exercise capacity  No evidence of ischemic ECG changes at peak stress  At peak stress, rhythm is sinus  tachycardia  No arrhythmia noted.    Medical Decision Making:  Problem List Items Addressed This Visit     ESRD (end stage renal disease) on dialysis (HCC)    Hypertension    Chest pain    Relevant Orders    CT-CTA HEART W/3D IMAGE    LIPID PANEL    Preoperative clearance        HTN - controlled. Continue current regimen.    Hx chest pain, elevated troponin, preoperative evaluation for renal transplant - check CCTA. METS >4, RCRI = 0, no active cardiac conditions. If coronaries without significant obstruction, no further intervention would modify perioperative risk.     Check lipids assess 10 year ascvd risk.    It was my pleasure to meet with Ms. Martín Nicole.

## 2020-04-22 NOTE — TELEPHONE ENCOUNTER
VR/milan Loomis from [facility] calling to review with you the pt's orders for treadmill stress echo test that they ordered.      Please call Ebonie on her cell# [phone]

## 2020-05-06 RX ORDER — ATENOLOL 25 MG/1
TABLET ORAL
Qty: 90 TAB | Refills: 0 | Status: SHIPPED | OUTPATIENT
Start: 2020-05-06 | End: 2020-07-01

## 2020-05-12 RX ORDER — DIPHENHYDRAMINE HYDROCHLORIDE 50 MG/ML
25 INJECTION INTRAMUSCULAR; INTRAVENOUS PRN
Status: CANCELLED | OUTPATIENT
Start: 2020-05-14

## 2020-05-12 RX ORDER — ACETAMINOPHEN 325 MG/1
650 TABLET ORAL PRN
Status: CANCELLED | OUTPATIENT
Start: 2020-05-14

## 2020-05-12 RX ORDER — DIPHENHYDRAMINE HCL 25 MG
25 TABLET ORAL ONCE
Status: CANCELLED | OUTPATIENT
Start: 2020-05-14

## 2020-05-12 RX ORDER — SODIUM CHLORIDE 9 MG/ML
500 INJECTION, SOLUTION INTRAVENOUS ONCE
Status: CANCELLED | OUTPATIENT
Start: 2020-05-14

## 2020-05-12 RX ORDER — LIDOCAINE HYDROCHLORIDE 10 MG/ML
20 INJECTION, SOLUTION INFILTRATION; PERINEURAL
Status: CANCELLED | OUTPATIENT
Start: 2020-05-14

## 2020-05-12 RX ORDER — ACETAMINOPHEN 325 MG/1
650 TABLET ORAL ONCE
Status: CANCELLED | OUTPATIENT
Start: 2020-05-14

## 2020-05-13 ENCOUNTER — TELEPHONE (OUTPATIENT)
Dept: ONCOLOGY | Facility: MEDICAL CENTER | Age: 25
End: 2020-05-13

## 2020-05-14 ENCOUNTER — HOSPITAL ENCOUNTER (OUTPATIENT)
Facility: MEDICAL CENTER | Age: 25
End: 2020-05-14
Attending: INTERNAL MEDICINE
Payer: COMMERCIAL

## 2020-05-14 ENCOUNTER — OUTPATIENT INFUSION SERVICES (OUTPATIENT)
Dept: ONCOLOGY | Facility: MEDICAL CENTER | Age: 25
End: 2020-05-14
Attending: INTERNAL MEDICINE
Payer: COMMERCIAL

## 2020-05-14 VITALS
WEIGHT: 144.18 LBS | HEART RATE: 80 BPM | HEIGHT: 66 IN | DIASTOLIC BLOOD PRESSURE: 89 MMHG | SYSTOLIC BLOOD PRESSURE: 145 MMHG | OXYGEN SATURATION: 100 % | TEMPERATURE: 98.1 F | RESPIRATION RATE: 18 BRPM | BODY MASS INDEX: 23.17 KG/M2

## 2020-05-14 DIAGNOSIS — D64.9 NORMOCYTIC ANEMIA: ICD-10-CM

## 2020-05-14 DIAGNOSIS — N18.5 CHRONIC KIDNEY DISEASE (CKD) STAGE G5/A1, GLOMERULAR FILTRATION RATE (GFR) LESS THAN OR EQUAL TO 15 ML/MIN/1.73 SQUARE METER AND ALBUMINURIA CREATININE RATIO LESS THAN 30 MG/G (HCC): ICD-10-CM

## 2020-05-14 LAB
ABO GROUP BLD: NORMAL
ALBUMIN SERPL BCP-MCNC: 3.5 G/DL (ref 3.2–4.9)
ALBUMIN/GLOB SERPL: 0.7 G/DL
ALP SERPL-CCNC: 54 U/L (ref 30–99)
ALT SERPL-CCNC: 12 U/L (ref 2–50)
ANION GAP SERPL CALC-SCNC: 13 MMOL/L (ref 7–16)
APTT PPP: 26.4 SEC (ref 24.7–36)
AST SERPL-CCNC: 27 U/L (ref 12–45)
BARCODED ABORH UBTYP: 5100
BARCODED ABORH UBTYP: 5100
BARCODED PRD CODE UBPRD: NORMAL
BARCODED PRD CODE UBPRD: NORMAL
BARCODED UNIT NUM UBUNT: NORMAL
BARCODED UNIT NUM UBUNT: NORMAL
BASO STIPL BLD QL SMEAR: NORMAL
BASOPHILS # BLD AUTO: 0.6 % (ref 0–1.8)
BASOPHILS # BLD: 0.02 K/UL (ref 0–0.12)
BILIRUB SERPL-MCNC: 0.4 MG/DL (ref 0.1–1.5)
BLD GP AB SCN SERPL QL: NORMAL
BUN SERPL-MCNC: 30 MG/DL (ref 8–22)
CALCIUM SERPL-MCNC: 9.7 MG/DL (ref 8.5–10.5)
CHLORIDE SERPL-SCNC: 91 MMOL/L (ref 96–112)
CLOSURE TME COLL+ADP BLD: NORMAL SEC (ref 62–104)
CO2 SERPL-SCNC: 30 MMOL/L (ref 20–33)
COMMENT 1642: NORMAL
COMPONENT R 8504R: NORMAL
COMPONENT R 8504R: NORMAL
CREAT SERPL-MCNC: 6.59 MG/DL (ref 0.5–1.4)
EOSINOPHIL # BLD AUTO: 0.07 K/UL (ref 0–0.51)
EOSINOPHIL NFR BLD: 2.1 % (ref 0–6.9)
ERYTHROCYTE [DISTWIDTH] IN BLOOD BY AUTOMATED COUNT: 59.9 FL (ref 35.9–50)
FERRITIN SERPL-MCNC: 350 NG/ML (ref 10–291)
FOLATE SERPL-MCNC: 3.7 NG/ML
GLOBULIN SER CALC-MCNC: 5.3 G/DL (ref 1.9–3.5)
GLUCOSE SERPL-MCNC: 87 MG/DL (ref 65–99)
HCT VFR BLD AUTO: 24.7 % (ref 37–47)
HGB BLD-MCNC: 7 G/DL (ref 12–16)
HGB RETIC QN AUTO: 25.1 PG/CELL (ref 29–35)
HYPOCHROMIA BLD QL SMEAR: ABNORMAL
IMM GRANULOCYTES # BLD AUTO: 0.02 K/UL (ref 0–0.11)
IMM GRANULOCYTES NFR BLD AUTO: 0.6 % (ref 0–0.9)
IMM RETICS NFR: 20.7 % (ref 9.3–17.4)
INR PPP: 1.11 (ref 0.87–1.13)
LDH SERPL L TO P-CCNC: 257 U/L (ref 107–266)
LYMPHOCYTES # BLD AUTO: 0.44 K/UL (ref 1–4.8)
LYMPHOCYTES NFR BLD: 13.3 % (ref 22–41)
MACROCYTES BLD QL SMEAR: ABNORMAL
MCH RBC QN AUTO: 25.2 PG (ref 27–33)
MCHC RBC AUTO-ENTMCNC: 28.3 G/DL (ref 33.6–35)
MCV RBC AUTO: 88.8 FL (ref 81.4–97.8)
MEDICATIONS NOTED 1688: NORMAL
MICROCYTES BLD QL SMEAR: ABNORMAL
MONOCYTES # BLD AUTO: 0.23 K/UL (ref 0–0.85)
MONOCYTES NFR BLD AUTO: 6.9 % (ref 0–13.4)
MORPHOLOGY BLD-IMP: NORMAL
NEUTROPHILS # BLD AUTO: 2.53 K/UL (ref 2–7.15)
NEUTROPHILS NFR BLD: 76.5 % (ref 44–72)
NRBC # BLD AUTO: 0 K/UL
NRBC BLD-RTO: 0 /100 WBC
OVALOCYTES BLD QL SMEAR: NORMAL
PLATELET # BLD AUTO: 85 K/UL (ref 164–446)
PLATELET BLD QL SMEAR: NORMAL
PLT FUNCTION COL/EPI  1661: NORMAL SEC (ref 83–170)
PMV BLD AUTO: 10.8 FL (ref 9–12.9)
POIKILOCYTOSIS BLD QL SMEAR: NORMAL
POLYCHROMASIA BLD QL SMEAR: NORMAL
POTASSIUM SERPL-SCNC: 4.5 MMOL/L (ref 3.6–5.5)
PRODUCT TYPE UPROD: NORMAL
PRODUCT TYPE UPROD: NORMAL
PROT SERPL-MCNC: 8.8 G/DL (ref 6–8.2)
PROTHROMBIN TIME: 14.6 SEC (ref 12–14.6)
RBC # BLD AUTO: 2.78 M/UL (ref 4.2–5.4)
RBC BLD AUTO: PRESENT
RETICS # AUTO: 0.09 M/UL (ref 0.04–0.06)
RETICS/RBC NFR: 3.1 % (ref 0.8–2.1)
RH BLD: NORMAL
SODIUM SERPL-SCNC: 134 MMOL/L (ref 135–145)
TSH SERPL DL<=0.005 MIU/L-ACNC: 7.81 UIU/ML (ref 0.38–5.33)
UNIT STATUS USTAT: NORMAL
UNIT STATUS USTAT: NORMAL
VIT B12 SERPL-MCNC: 420 PG/ML (ref 211–911)
WBC # BLD AUTO: 3.3 K/UL (ref 4.8–10.8)

## 2020-05-14 PROCEDURE — 85025 COMPLETE CBC W/AUTO DIFF WBC: CPT

## 2020-05-14 PROCEDURE — 83010 ASSAY OF HAPTOGLOBIN QUANT: CPT

## 2020-05-14 PROCEDURE — A9270 NON-COVERED ITEM OR SERVICE: HCPCS | Performed by: INTERNAL MEDICINE

## 2020-05-14 PROCEDURE — 80053 COMPREHEN METABOLIC PANEL: CPT

## 2020-05-14 PROCEDURE — 85245 CLOT FACTOR VIII VW RISTOCTN: CPT

## 2020-05-14 PROCEDURE — 86900 BLOOD TYPING SEROLOGIC ABO: CPT

## 2020-05-14 PROCEDURE — 36415 COLL VENOUS BLD VENIPUNCTURE: CPT

## 2020-05-14 PROCEDURE — 85046 RETICYTE/HGB CONCENTRATE: CPT

## 2020-05-14 PROCEDURE — 84155 ASSAY OF PROTEIN SERUM: CPT

## 2020-05-14 PROCEDURE — 85610 PROTHROMBIN TIME: CPT

## 2020-05-14 PROCEDURE — 82728 ASSAY OF FERRITIN: CPT

## 2020-05-14 PROCEDURE — 86901 BLOOD TYPING SEROLOGIC RH(D): CPT

## 2020-05-14 PROCEDURE — 36430 TRANSFUSION BLD/BLD COMPNT: CPT

## 2020-05-14 PROCEDURE — 84443 ASSAY THYROID STIM HORMONE: CPT

## 2020-05-14 PROCEDURE — 85576 BLOOD PLATELET AGGREGATION: CPT | Mod: 91

## 2020-05-14 PROCEDURE — 83615 LACTATE (LD) (LDH) ENZYME: CPT

## 2020-05-14 PROCEDURE — 85240 CLOT FACTOR VIII AHG 1 STAGE: CPT

## 2020-05-14 PROCEDURE — 83520 IMMUNOASSAY QUANT NOS NONAB: CPT

## 2020-05-14 PROCEDURE — 82607 VITAMIN B-12: CPT

## 2020-05-14 PROCEDURE — 82746 ASSAY OF FOLIC ACID SERUM: CPT

## 2020-05-14 PROCEDURE — 86923 COMPATIBILITY TEST ELECTRIC: CPT | Mod: 91

## 2020-05-14 PROCEDURE — 84165 PROTEIN E-PHORESIS SERUM: CPT

## 2020-05-14 PROCEDURE — P9016 RBC LEUKOCYTES REDUCED: HCPCS

## 2020-05-14 PROCEDURE — 86850 RBC ANTIBODY SCREEN: CPT

## 2020-05-14 PROCEDURE — 700102 HCHG RX REV CODE 250 W/ 637 OVERRIDE(OP): Performed by: INTERNAL MEDICINE

## 2020-05-14 PROCEDURE — 85246 CLOT FACTOR VIII VW ANTIGEN: CPT

## 2020-05-14 PROCEDURE — 306780 HCHG STAT FOR TRANSFUSION PER CASE

## 2020-05-14 PROCEDURE — 85730 THROMBOPLASTIN TIME PARTIAL: CPT

## 2020-05-14 RX ORDER — ACETAMINOPHEN 325 MG/1
650 TABLET ORAL ONCE
Status: CANCELLED | OUTPATIENT
Start: 2020-05-14

## 2020-05-14 RX ORDER — ACETAMINOPHEN 325 MG/1
650 TABLET ORAL ONCE
Status: COMPLETED | OUTPATIENT
Start: 2020-05-14 | End: 2020-05-14

## 2020-05-14 RX ORDER — SODIUM CHLORIDE 9 MG/ML
500 INJECTION, SOLUTION INTRAVENOUS ONCE
Status: CANCELLED | OUTPATIENT
Start: 2020-05-14

## 2020-05-14 RX ORDER — ONDANSETRON HYDROCHLORIDE 8 MG/1
8 TABLET, FILM COATED ORAL EVERY 8 HOURS PRN
COMMUNITY
End: 2020-05-29

## 2020-05-14 RX ORDER — DIPHENHYDRAMINE HYDROCHLORIDE 50 MG/ML
25 INJECTION INTRAMUSCULAR; INTRAVENOUS PRN
Status: CANCELLED | OUTPATIENT
Start: 2020-05-14

## 2020-05-14 RX ORDER — ACETAMINOPHEN 325 MG/1
650 TABLET ORAL PRN
Status: CANCELLED | OUTPATIENT
Start: 2020-05-14

## 2020-05-14 RX ORDER — DIPHENHYDRAMINE HCL 25 MG
25 TABLET ORAL ONCE
Status: CANCELLED | OUTPATIENT
Start: 2020-05-14

## 2020-05-14 RX ORDER — DIPHENHYDRAMINE HCL 25 MG
25 TABLET ORAL ONCE
Status: COMPLETED | OUTPATIENT
Start: 2020-05-14 | End: 2020-05-14

## 2020-05-14 RX ORDER — LIDOCAINE HYDROCHLORIDE 10 MG/ML
20 INJECTION, SOLUTION INFILTRATION; PERINEURAL
Status: CANCELLED | OUTPATIENT
Start: 2020-05-14

## 2020-05-14 RX ADMIN — DIPHENHYDRAMINE HCL 25 MG: 25 TABLET ORAL at 11:12

## 2020-05-14 RX ADMIN — ACETAMINOPHEN 650 MG: 325 TABLET ORAL at 11:12

## 2020-05-14 ASSESSMENT — FIBROSIS 4 INDEX: FIB4 SCORE: 1.32664991614215994

## 2020-05-14 NOTE — PROGRESS NOTES
arrives for blood transfusion. Lily oriented to infusion services and unit routine. PIV started, COD and CBC drawn. Patient's Hgb 7.0, Hct 24.7. Lily c/o fatigue and no shortness of breath. Blood transfusion consents signed. Potential side effects reviewed. 2 unites PRBCs transfused without incident. Patient DC'd home to self care in good condition and in NAD. No further appointments needed.   Multiple other labs drawn, per patients request, physician notified of results availability in Epic.

## 2020-05-16 LAB
HAPTOGLOB SERPL-MCNC: 174 MG/DL (ref 30–200)
KAPPA LC FREE SER-MCNC: 1089.09 MG/L (ref 3.3–19.4)
KAPPA LC FREE/LAMBDA FREE SER NEPH: 2.59 {RATIO} (ref 0.26–1.65)
LAMBDA LC FREE SERPL-MCNC: 420.58 MG/L (ref 5.71–26.3)

## 2020-05-17 LAB
FACT VIII ACT/NOR PPP: 255 % (ref 56–191)
VWF AG ACT/NOR PPP IA: 273 % (ref 52–214)
VWF:RCO ACT/NOR PPP PL AGG: 146 % (ref 51–215)

## 2020-05-18 LAB
ALBUMIN SERPL ELPH-MCNC: 3.48 G/DL (ref 3.75–5.01)
ALPHA1 GLOB SERPL ELPH-MCNC: 0.56 G/DL (ref 0.19–0.46)
ALPHA2 GLOB SERPL ELPH-MCNC: 0.8 G/DL (ref 0.48–1.05)
B-GLOBULIN SERPL ELPH-MCNC: 0.64 G/DL (ref 0.48–1.1)
EER PROT ELECT SER Q1092: ABNORMAL
GAMMA GLOB SERPL ELPH-MCNC: 3.31 G/DL (ref 0.62–1.51)
INTERPRETATION SERPL IFE-IMP: ABNORMAL
PROT SERPL-MCNC: 8.8 G/DL (ref 6.3–8.2)

## 2020-05-28 ENCOUNTER — HOSPITAL ENCOUNTER (EMERGENCY)
Facility: MEDICAL CENTER | Age: 25
End: 2020-05-29
Attending: EMERGENCY MEDICINE
Payer: COMMERCIAL

## 2020-05-28 ENCOUNTER — APPOINTMENT (OUTPATIENT)
Dept: RADIOLOGY | Facility: MEDICAL CENTER | Age: 25
End: 2020-05-28
Attending: EMERGENCY MEDICINE
Payer: COMMERCIAL

## 2020-05-28 DIAGNOSIS — R10.84 GENERALIZED ABDOMINAL PAIN: ICD-10-CM

## 2020-05-28 LAB
ALBUMIN SERPL BCP-MCNC: 3.7 G/DL (ref 3.2–4.9)
ALBUMIN/GLOB SERPL: 0.7 G/DL
ALP SERPL-CCNC: 65 U/L (ref 30–99)
ALT SERPL-CCNC: 13 U/L (ref 2–50)
ANION GAP SERPL CALC-SCNC: 14 MMOL/L (ref 7–16)
ANISOCYTOSIS BLD QL SMEAR: ABNORMAL
APPEARANCE UR: CLEAR
AST SERPL-CCNC: 30 U/L (ref 12–45)
BASO STIPL BLD QL SMEAR: NORMAL
BASOPHILS # BLD AUTO: 0.6 % (ref 0–1.8)
BASOPHILS # BLD: 0.03 K/UL (ref 0–0.12)
BILIRUB SERPL-MCNC: 0.4 MG/DL (ref 0.1–1.5)
BILIRUB UR QL STRIP.AUTO: NEGATIVE
BUN SERPL-MCNC: 39 MG/DL (ref 8–22)
CALCIUM SERPL-MCNC: 9.1 MG/DL (ref 8.4–10.2)
CHLORIDE SERPL-SCNC: 89 MMOL/L (ref 96–112)
CO2 SERPL-SCNC: 29 MMOL/L (ref 20–33)
COLOR UR: YELLOW
COMMENT 1642: NORMAL
CREAT SERPL-MCNC: 7.18 MG/DL (ref 0.5–1.4)
EOSINOPHIL # BLD AUTO: 0.06 K/UL (ref 0–0.51)
EOSINOPHIL NFR BLD: 1.1 % (ref 0–6.9)
EPI CELLS #/AREA URNS HPF: NORMAL /HPF
ERYTHROCYTE [DISTWIDTH] IN BLOOD BY AUTOMATED COUNT: 60.7 FL (ref 35.9–50)
GLOBULIN SER CALC-MCNC: 5.2 G/DL (ref 1.9–3.5)
GLUCOSE SERPL-MCNC: 87 MG/DL (ref 65–99)
GLUCOSE UR STRIP.AUTO-MCNC: NEGATIVE MG/DL
HCG SERPL QL: NEGATIVE
HCT VFR BLD AUTO: 23.6 % (ref 37–47)
HGB BLD-MCNC: 6.9 G/DL (ref 12–16)
HYPOCHROMIA BLD QL SMEAR: ABNORMAL
IMM GRANULOCYTES # BLD AUTO: 0.03 K/UL (ref 0–0.11)
IMM GRANULOCYTES NFR BLD AUTO: 0.6 % (ref 0–0.9)
KETONES UR STRIP.AUTO-MCNC: NEGATIVE MG/DL
LEUKOCYTE ESTERASE UR QL STRIP.AUTO: NEGATIVE
LIPASE SERPL-CCNC: 34 U/L (ref 7–58)
LYMPHOCYTES # BLD AUTO: 0.84 K/UL (ref 1–4.8)
LYMPHOCYTES NFR BLD: 15.9 % (ref 22–41)
MACROCYTES BLD QL SMEAR: ABNORMAL
MCH RBC QN AUTO: 26.3 PG (ref 27–33)
MCHC RBC AUTO-ENTMCNC: 29.2 G/DL (ref 33.6–35)
MCV RBC AUTO: 90.1 FL (ref 81.4–97.8)
MICRO URNS: ABNORMAL
MICROCYTES BLD QL SMEAR: ABNORMAL
MONOCYTES # BLD AUTO: 0.39 K/UL (ref 0–0.85)
MONOCYTES NFR BLD AUTO: 7.4 % (ref 0–13.4)
NEUTROPHILS # BLD AUTO: 3.92 K/UL (ref 2–7.15)
NEUTROPHILS NFR BLD: 74.4 % (ref 44–72)
NITRITE UR QL STRIP.AUTO: NEGATIVE
NRBC # BLD AUTO: 0 K/UL
NRBC BLD-RTO: 0 /100 WBC
OVALOCYTES BLD QL SMEAR: NORMAL
PH UR STRIP.AUTO: 8.5 [PH] (ref 5–8)
PLATELET # BLD AUTO: 92 K/UL (ref 164–446)
PLATELET BLD QL SMEAR: NORMAL
PMV BLD AUTO: 10.8 FL (ref 9–12.9)
POIKILOCYTOSIS BLD QL SMEAR: NORMAL
POLYCHROMASIA BLD QL SMEAR: NORMAL
POTASSIUM SERPL-SCNC: 4.9 MMOL/L (ref 3.6–5.5)
PROT SERPL-MCNC: 8.9 G/DL (ref 6–8.2)
PROT UR QL STRIP: 100 MG/DL
RBC # BLD AUTO: 2.62 M/UL (ref 4.2–5.4)
RBC # URNS HPF: NORMAL /HPF
RBC BLD AUTO: PRESENT
RBC UR QL AUTO: ABNORMAL
SODIUM SERPL-SCNC: 132 MMOL/L (ref 135–145)
SP GR UR STRIP.AUTO: 1.01
STOMATOCYTES BLD QL SMEAR: NORMAL
WBC # BLD AUTO: 5.3 K/UL (ref 4.8–10.8)
WBC #/AREA URNS HPF: NORMAL /HPF

## 2020-05-28 PROCEDURE — 85025 COMPLETE CBC W/AUTO DIFF WBC: CPT

## 2020-05-28 PROCEDURE — 84703 CHORIONIC GONADOTROPIN ASSAY: CPT

## 2020-05-28 PROCEDURE — 81001 URINALYSIS AUTO W/SCOPE: CPT

## 2020-05-28 PROCEDURE — 80053 COMPREHEN METABOLIC PANEL: CPT

## 2020-05-28 PROCEDURE — 83690 ASSAY OF LIPASE: CPT

## 2020-05-28 PROCEDURE — 99284 EMERGENCY DEPT VISIT MOD MDM: CPT

## 2020-05-28 PROCEDURE — 96374 THER/PROPH/DIAG INJ IV PUSH: CPT

## 2020-05-28 PROCEDURE — 96375 TX/PRO/DX INJ NEW DRUG ADDON: CPT

## 2020-05-28 PROCEDURE — 700111 HCHG RX REV CODE 636 W/ 250 OVERRIDE (IP): Performed by: EMERGENCY MEDICINE

## 2020-05-28 RX ORDER — MORPHINE SULFATE 4 MG/ML
4 INJECTION, SOLUTION INTRAMUSCULAR; INTRAVENOUS ONCE
Status: COMPLETED | OUTPATIENT
Start: 2020-05-28 | End: 2020-05-28

## 2020-05-28 RX ORDER — ONDANSETRON 2 MG/ML
4 INJECTION INTRAMUSCULAR; INTRAVENOUS ONCE
Status: COMPLETED | OUTPATIENT
Start: 2020-05-28 | End: 2020-05-28

## 2020-05-28 RX ADMIN — ONDANSETRON 4 MG: 2 INJECTION INTRAMUSCULAR; INTRAVENOUS at 23:14

## 2020-05-28 RX ADMIN — MORPHINE SULFATE 4 MG: 4 INJECTION INTRAVENOUS at 23:14

## 2020-05-28 ASSESSMENT — FIBROSIS 4 INDEX: FIB4 SCORE: 2.02

## 2020-05-29 ENCOUNTER — HOSPITAL ENCOUNTER (INPATIENT)
Facility: MEDICAL CENTER | Age: 25
LOS: 1 days | DRG: 368 | End: 2020-05-31
Attending: EMERGENCY MEDICINE | Admitting: INTERNAL MEDICINE
Payer: COMMERCIAL

## 2020-05-29 VITALS
SYSTOLIC BLOOD PRESSURE: 179 MMHG | RESPIRATION RATE: 19 BRPM | TEMPERATURE: 98.2 F | OXYGEN SATURATION: 96 % | BODY MASS INDEX: 23.32 KG/M2 | WEIGHT: 148.59 LBS | HEART RATE: 89 BPM | HEIGHT: 67 IN | DIASTOLIC BLOOD PRESSURE: 75 MMHG

## 2020-05-29 DIAGNOSIS — K92.0 HEMATEMESIS WITH NAUSEA: ICD-10-CM

## 2020-05-29 DIAGNOSIS — K29.01 ACUTE GASTRITIS WITH HEMORRHAGE, UNSPECIFIED GASTRITIS TYPE: ICD-10-CM

## 2020-05-29 PROBLEM — K92.2 UPPER GI BLEED: Status: ACTIVE | Noted: 2020-05-29

## 2020-05-29 LAB
ABO GROUP BLD: NORMAL
ALBUMIN SERPL BCP-MCNC: 3.9 G/DL (ref 3.2–4.9)
ALBUMIN/GLOB SERPL: 0.7 G/DL
ALP SERPL-CCNC: 95 U/L (ref 30–99)
ALT SERPL-CCNC: 57 U/L (ref 2–50)
ANION GAP SERPL CALC-SCNC: 12 MMOL/L (ref 7–16)
ANISOCYTOSIS BLD QL SMEAR: ABNORMAL
APTT PPP: 29.2 SEC (ref 24.7–36)
AST SERPL-CCNC: 87 U/L (ref 12–45)
BARCODED ABORH UBTYP: 5100
BARCODED ABORH UBTYP: 5100
BARCODED PRD CODE UBPRD: NORMAL
BARCODED PRD CODE UBPRD: NORMAL
BARCODED UNIT NUM UBUNT: NORMAL
BARCODED UNIT NUM UBUNT: NORMAL
BASOPHILS # BLD AUTO: 0.7 % (ref 0–1.8)
BASOPHILS # BLD: 0.03 K/UL (ref 0–0.12)
BILIRUB SERPL-MCNC: 0.5 MG/DL (ref 0.1–1.5)
BLD GP AB SCN SERPL QL: NORMAL
BUN SERPL-MCNC: 15 MG/DL (ref 8–22)
CALCIUM SERPL-MCNC: 9.4 MG/DL (ref 8.4–10.2)
CHLORIDE SERPL-SCNC: 89 MMOL/L (ref 96–112)
CO2 SERPL-SCNC: 30 MMOL/L (ref 20–33)
COMMENT 1642: NORMAL
COMPONENT R 8504R: NORMAL
COMPONENT R 8504R: NORMAL
COVID ORDER STATUS COVID19: NORMAL
COVID ORDER STATUS COVID19: NORMAL
CREAT SERPL-MCNC: 4.22 MG/DL (ref 0.5–1.4)
EOSINOPHIL # BLD AUTO: 0.03 K/UL (ref 0–0.51)
EOSINOPHIL NFR BLD: 0.7 % (ref 0–6.9)
ERYTHROCYTE [DISTWIDTH] IN BLOOD BY AUTOMATED COUNT: 60.8 FL (ref 35.9–50)
GLOBULIN SER CALC-MCNC: 5.8 G/DL (ref 1.9–3.5)
GLUCOSE SERPL-MCNC: 90 MG/DL (ref 65–99)
HCT VFR BLD AUTO: 26.4 % (ref 37–47)
HGB BLD-MCNC: 6.8 G/DL (ref 12–16)
HGB BLD-MCNC: 7.7 G/DL (ref 12–16)
HYPOCHROMIA BLD QL SMEAR: ABNORMAL
IMM GRANULOCYTES # BLD AUTO: 0.04 K/UL (ref 0–0.11)
IMM GRANULOCYTES NFR BLD AUTO: 1 % (ref 0–0.9)
INR PPP: 1.05 (ref 0.87–1.13)
LYMPHOCYTES # BLD AUTO: 0.48 K/UL (ref 1–4.8)
LYMPHOCYTES NFR BLD: 11.7 % (ref 22–41)
MCH RBC QN AUTO: 26.1 PG (ref 27–33)
MCHC RBC AUTO-ENTMCNC: 29.2 G/DL (ref 33.6–35)
MCV RBC AUTO: 89.5 FL (ref 81.4–97.8)
MONOCYTES # BLD AUTO: 0.29 K/UL (ref 0–0.85)
MONOCYTES NFR BLD AUTO: 7.1 % (ref 0–13.4)
NEUTROPHILS # BLD AUTO: 3.22 K/UL (ref 2–7.15)
NEUTROPHILS NFR BLD: 78.8 % (ref 44–72)
NRBC # BLD AUTO: 0 K/UL
NRBC BLD-RTO: 0 /100 WBC
OVALOCYTES BLD QL SMEAR: NORMAL
PLATELET # BLD AUTO: 84 K/UL (ref 164–446)
PLATELET BLD QL SMEAR: NORMAL
PMV BLD AUTO: 11.4 FL (ref 9–12.9)
POIKILOCYTOSIS BLD QL SMEAR: NORMAL
POLYCHROMASIA BLD QL SMEAR: NORMAL
POTASSIUM SERPL-SCNC: 4.6 MMOL/L (ref 3.6–5.5)
PRODUCT TYPE UPROD: NORMAL
PRODUCT TYPE UPROD: NORMAL
PROT SERPL-MCNC: 9.7 G/DL (ref 6–8.2)
PROTHROMBIN TIME: 13.8 SEC (ref 12–14.6)
RBC # BLD AUTO: 2.95 M/UL (ref 4.2–5.4)
RBC BLD AUTO: PRESENT
RH BLD: NORMAL
SARS-COV-2 RNA RESP QL NAA+PROBE: NOTDETECTED
SODIUM SERPL-SCNC: 131 MMOL/L (ref 135–145)
SPECIMEN SOURCE: NORMAL
SPHEROCYTES BLD QL SMEAR: NORMAL
UNIT STATUS USTAT: NORMAL
UNIT STATUS USTAT: NORMAL
WBC # BLD AUTO: 4.1 K/UL (ref 4.8–10.8)

## 2020-05-29 PROCEDURE — G0378 HOSPITAL OBSERVATION PER HR: HCPCS

## 2020-05-29 PROCEDURE — 80053 COMPREHEN METABOLIC PANEL: CPT

## 2020-05-29 PROCEDURE — A9270 NON-COVERED ITEM OR SERVICE: HCPCS

## 2020-05-29 PROCEDURE — C9803 HOPD COVID-19 SPEC COLLECT: HCPCS | Performed by: INTERNAL MEDICINE

## 2020-05-29 PROCEDURE — 96374 THER/PROPH/DIAG INJ IV PUSH: CPT

## 2020-05-29 PROCEDURE — C9113 INJ PANTOPRAZOLE SODIUM, VIA: HCPCS | Performed by: INTERNAL MEDICINE

## 2020-05-29 PROCEDURE — 36415 COLL VENOUS BLD VENIPUNCTURE: CPT

## 2020-05-29 PROCEDURE — 96375 TX/PRO/DX INJ NEW DRUG ADDON: CPT

## 2020-05-29 PROCEDURE — A9270 NON-COVERED ITEM OR SERVICE: HCPCS | Performed by: INTERNAL MEDICINE

## 2020-05-29 PROCEDURE — 99285 EMERGENCY DEPT VISIT HI MDM: CPT

## 2020-05-29 PROCEDURE — 85018 HEMOGLOBIN: CPT

## 2020-05-29 PROCEDURE — 700105 HCHG RX REV CODE 258: Performed by: EMERGENCY MEDICINE

## 2020-05-29 PROCEDURE — 700111 HCHG RX REV CODE 636 W/ 250 OVERRIDE (IP): Performed by: EMERGENCY MEDICINE

## 2020-05-29 PROCEDURE — 86923 COMPATIBILITY TEST ELECTRIC: CPT | Mod: 91

## 2020-05-29 PROCEDURE — C9803 HOPD COVID-19 SPEC COLLECT: HCPCS | Performed by: EMERGENCY MEDICINE

## 2020-05-29 PROCEDURE — 86901 BLOOD TYPING SEROLOGIC RH(D): CPT

## 2020-05-29 PROCEDURE — U0004 COV-19 TEST NON-CDC HGH THRU: HCPCS

## 2020-05-29 PROCEDURE — 99220 PR INITIAL OBSERVATION CARE,LEVL III: CPT | Performed by: INTERNAL MEDICINE

## 2020-05-29 PROCEDURE — 700102 HCHG RX REV CODE 250 W/ 637 OVERRIDE(OP): Performed by: INTERNAL MEDICINE

## 2020-05-29 PROCEDURE — 86850 RBC ANTIBODY SCREEN: CPT

## 2020-05-29 PROCEDURE — 74176 CT ABD & PELVIS W/O CONTRAST: CPT

## 2020-05-29 PROCEDURE — C9113 INJ PANTOPRAZOLE SODIUM, VIA: HCPCS | Performed by: EMERGENCY MEDICINE

## 2020-05-29 PROCEDURE — 85730 THROMBOPLASTIN TIME PARTIAL: CPT

## 2020-05-29 PROCEDURE — 85025 COMPLETE CBC W/AUTO DIFF WBC: CPT

## 2020-05-29 PROCEDURE — 86900 BLOOD TYPING SEROLOGIC ABO: CPT

## 2020-05-29 PROCEDURE — 85610 PROTHROMBIN TIME: CPT

## 2020-05-29 PROCEDURE — 700111 HCHG RX REV CODE 636 W/ 250 OVERRIDE (IP): Performed by: INTERNAL MEDICINE

## 2020-05-29 PROCEDURE — 700102 HCHG RX REV CODE 250 W/ 637 OVERRIDE(OP)

## 2020-05-29 RX ORDER — AMOXICILLIN 250 MG
2 CAPSULE ORAL 2 TIMES DAILY
Status: DISCONTINUED | OUTPATIENT
Start: 2020-05-29 | End: 2020-05-31 | Stop reason: HOSPADM

## 2020-05-29 RX ORDER — HYDROMORPHONE HYDROCHLORIDE 1 MG/ML
0.5 INJECTION, SOLUTION INTRAMUSCULAR; INTRAVENOUS; SUBCUTANEOUS
Status: COMPLETED | OUTPATIENT
Start: 2020-05-29 | End: 2020-05-30

## 2020-05-29 RX ORDER — HYDROXYCHLOROQUINE SULFATE 200 MG/1
200 TABLET, FILM COATED ORAL EVERY EVENING
Status: DISCONTINUED | OUTPATIENT
Start: 2020-05-29 | End: 2020-05-31 | Stop reason: HOSPADM

## 2020-05-29 RX ORDER — MYCOPHENOLIC ACID 360 MG/1
360 TABLET, DELAYED RELEASE ORAL DAILY
Status: DISCONTINUED | OUTPATIENT
Start: 2020-05-30 | End: 2020-05-29

## 2020-05-29 RX ORDER — MYCOPHENOLIC ACID 360 MG/1
360 TABLET, DELAYED RELEASE ORAL EVERY EVENING
Status: DISCONTINUED | OUTPATIENT
Start: 2020-05-29 | End: 2020-05-29

## 2020-05-29 RX ORDER — ACETAMINOPHEN 325 MG/1
650 TABLET ORAL EVERY 6 HOURS PRN
Status: DISCONTINUED | OUTPATIENT
Start: 2020-05-29 | End: 2020-05-31 | Stop reason: HOSPADM

## 2020-05-29 RX ORDER — AMLODIPINE BESYLATE 5 MG/1
10 TABLET ORAL EVERY EVENING
Status: DISCONTINUED | OUTPATIENT
Start: 2020-05-29 | End: 2020-05-31 | Stop reason: HOSPADM

## 2020-05-29 RX ORDER — MYCOPHENOLATE MOFETIL 200 MG/ML
360 POWDER, FOR SUSPENSION ORAL EVERY EVENING
Status: DISCONTINUED | OUTPATIENT
Start: 2020-05-29 | End: 2020-05-29

## 2020-05-29 RX ORDER — MYCOPHENOLATE MOFETIL 250 MG/1
500 CAPSULE ORAL EVERY EVENING
Status: DISCONTINUED | OUTPATIENT
Start: 2020-05-29 | End: 2020-05-31 | Stop reason: HOSPADM

## 2020-05-29 RX ORDER — BISACODYL 10 MG
10 SUPPOSITORY, RECTAL RECTAL
Status: DISCONTINUED | OUTPATIENT
Start: 2020-05-29 | End: 2020-05-31 | Stop reason: HOSPADM

## 2020-05-29 RX ORDER — PROMETHAZINE HYDROCHLORIDE 25 MG/1
12.5-25 TABLET ORAL EVERY 4 HOURS PRN
Status: DISCONTINUED | OUTPATIENT
Start: 2020-05-29 | End: 2020-05-31 | Stop reason: HOSPADM

## 2020-05-29 RX ORDER — ONDANSETRON 4 MG/1
4 TABLET, ORALLY DISINTEGRATING ORAL EVERY 4 HOURS PRN
Status: DISCONTINUED | OUTPATIENT
Start: 2020-05-29 | End: 2020-05-31 | Stop reason: HOSPADM

## 2020-05-29 RX ORDER — ONDANSETRON 2 MG/ML
4 INJECTION INTRAMUSCULAR; INTRAVENOUS EVERY 4 HOURS PRN
Status: DISCONTINUED | OUTPATIENT
Start: 2020-05-29 | End: 2020-05-31 | Stop reason: HOSPADM

## 2020-05-29 RX ORDER — ATENOLOL 25 MG/1
25 TABLET ORAL
Status: DISCONTINUED | OUTPATIENT
Start: 2020-05-30 | End: 2020-05-31 | Stop reason: HOSPADM

## 2020-05-29 RX ORDER — PROMETHAZINE HYDROCHLORIDE 25 MG/1
12.5-25 SUPPOSITORY RECTAL EVERY 4 HOURS PRN
Status: DISCONTINUED | OUTPATIENT
Start: 2020-05-29 | End: 2020-05-31 | Stop reason: HOSPADM

## 2020-05-29 RX ORDER — HYDROXYCHLOROQUINE SULFATE 200 MG/1
TABLET, FILM COATED ORAL
Status: COMPLETED
Start: 2020-05-29 | End: 2020-05-29

## 2020-05-29 RX ORDER — ONDANSETRON 2 MG/ML
4 INJECTION INTRAMUSCULAR; INTRAVENOUS ONCE
Status: COMPLETED | OUTPATIENT
Start: 2020-05-29 | End: 2020-05-29

## 2020-05-29 RX ORDER — PANTOPRAZOLE SODIUM 40 MG/10ML
40 INJECTION, POWDER, LYOPHILIZED, FOR SOLUTION INTRAVENOUS 2 TIMES DAILY
Status: DISCONTINUED | OUTPATIENT
Start: 2020-05-29 | End: 2020-05-31 | Stop reason: HOSPADM

## 2020-05-29 RX ORDER — ATENOLOL 25 MG/1
25 TABLET ORAL
Status: DISCONTINUED | OUTPATIENT
Start: 2020-05-29 | End: 2020-05-29

## 2020-05-29 RX ORDER — PANTOPRAZOLE SODIUM 40 MG/10ML
40 INJECTION, POWDER, LYOPHILIZED, FOR SOLUTION INTRAVENOUS 2 TIMES DAILY
Status: DISCONTINUED | OUTPATIENT
Start: 2020-05-29 | End: 2020-05-29

## 2020-05-29 RX ORDER — POLYETHYLENE GLYCOL 3350 17 G/17G
1 POWDER, FOR SOLUTION ORAL
Status: DISCONTINUED | OUTPATIENT
Start: 2020-05-29 | End: 2020-05-31 | Stop reason: HOSPADM

## 2020-05-29 RX ORDER — PROCHLORPERAZINE EDISYLATE 5 MG/ML
5-10 INJECTION INTRAMUSCULAR; INTRAVENOUS EVERY 4 HOURS PRN
Status: DISCONTINUED | OUTPATIENT
Start: 2020-05-29 | End: 2020-05-31 | Stop reason: HOSPADM

## 2020-05-29 RX ORDER — QUETIAPINE FUMARATE 25 MG/1
TABLET, FILM COATED ORAL
Status: DISPENSED
Start: 2020-05-29 | End: 2020-05-30

## 2020-05-29 RX ORDER — PREDNISONE 5 MG/1
5 TABLET ORAL EVERY EVENING
Status: DISCONTINUED | OUTPATIENT
Start: 2020-05-29 | End: 2020-05-31 | Stop reason: HOSPADM

## 2020-05-29 RX ADMIN — AMLODIPINE BESYLATE 10 MG: 5 TABLET ORAL at 21:55

## 2020-05-29 RX ADMIN — HYDROXYCHLOROQUINE SULFATE 200 MG: 200 TABLET, FILM COATED ORAL at 21:57

## 2020-05-29 RX ADMIN — ONDANSETRON 4 MG: 2 INJECTION INTRAMUSCULAR; INTRAVENOUS at 15:47

## 2020-05-29 RX ADMIN — HYDROMORPHONE HYDROCHLORIDE 0.5 MG: 1 INJECTION, SOLUTION INTRAMUSCULAR; INTRAVENOUS; SUBCUTANEOUS at 15:48

## 2020-05-29 RX ADMIN — MYCOPHENOLATE MOFETIL 500 MG: 250 CAPSULE ORAL at 21:56

## 2020-05-29 RX ADMIN — SODIUM CHLORIDE 80 MG: 9 INJECTION, SOLUTION INTRAVENOUS at 19:33

## 2020-05-29 RX ADMIN — HYDROMORPHONE HYDROCHLORIDE 0.5 MG: 1 INJECTION, SOLUTION INTRAMUSCULAR; INTRAVENOUS; SUBCUTANEOUS at 21:46

## 2020-05-29 RX ADMIN — SENNOSIDES AND DOCUSATE SODIUM 2 TABLET: 8.6; 5 TABLET ORAL at 21:56

## 2020-05-29 RX ADMIN — PANTOPRAZOLE SODIUM 40 MG: 40 INJECTION, POWDER, LYOPHILIZED, FOR SOLUTION INTRAVENOUS at 21:46

## 2020-05-29 RX ADMIN — PREDNISONE 5 MG: 10 TABLET ORAL at 21:56

## 2020-05-29 RX ADMIN — ONDANSETRON 4 MG: 4 TABLET, ORALLY DISINTEGRATING ORAL at 21:44

## 2020-05-29 SDOH — ECONOMIC STABILITY: FOOD INSECURITY: WITHIN THE PAST 12 MONTHS, YOU WORRIED THAT YOUR FOOD WOULD RUN OUT BEFORE YOU GOT MONEY TO BUY MORE.: NEVER TRUE

## 2020-05-29 SDOH — ECONOMIC STABILITY: TRANSPORTATION INSECURITY
IN THE PAST 12 MONTHS, HAS THE LACK OF TRANSPORTATION KEPT YOU FROM MEDICAL APPOINTMENTS OR FROM GETTING MEDICATIONS?: NO

## 2020-05-29 SDOH — ECONOMIC STABILITY: FOOD INSECURITY: WITHIN THE PAST 12 MONTHS, THE FOOD YOU BOUGHT JUST DIDN'T LAST AND YOU DIDN'T HAVE MONEY TO GET MORE.: NEVER TRUE

## 2020-05-29 SDOH — ECONOMIC STABILITY: TRANSPORTATION INSECURITY
IN THE PAST 12 MONTHS, HAS LACK OF TRANSPORTATION KEPT YOU FROM MEETINGS, WORK, OR FROM GETTING THINGS NEEDED FOR DAILY LIVING?: NO

## 2020-05-29 ASSESSMENT — COPD QUESTIONNAIRES
IN THE PAST 12 MONTHS DO YOU DO LESS THAN YOU USED TO BECAUSE OF YOUR BREATHING PROBLEMS: DISAGREE/UNSURE
DURING THE PAST 4 WEEKS HOW MUCH DID YOU FEEL SHORT OF BREATH: NONE/LITTLE OF THE TIME
DO YOU EVER COUGH UP ANY MUCUS OR PHLEGM?: NO/ONLY WITH OCCASIONAL COLDS OR INFECTIONS
HAVE YOU SMOKED AT LEAST 100 CIGARETTES IN YOUR ENTIRE LIFE: NO/DON'T KNOW

## 2020-05-29 ASSESSMENT — LIFESTYLE VARIABLES
AVERAGE NUMBER OF DAYS PER WEEK YOU HAVE A DRINK CONTAINING ALCOHOL: 0
SUBSTANCE_ABUSE: 0
HAVE PEOPLE ANNOYED YOU BY CRITICIZING YOUR DRINKING: NO
ALCOHOL_USE: NO
EVER_SMOKED: NEVER
HAVE YOU EVER FELT YOU SHOULD CUT DOWN ON YOUR DRINKING: NO
CONSUMPTION TOTAL: NEGATIVE
TOTAL SCORE: 0
ON A TYPICAL DAY WHEN YOU DRINK ALCOHOL HOW MANY DRINKS DO YOU HAVE: 0
EVER FELT BAD OR GUILTY ABOUT YOUR DRINKING: NO
TOTAL SCORE: 0
HOW MANY TIMES IN THE PAST YEAR HAVE YOU HAD 5 OR MORE DRINKS IN A DAY: 0
EVER HAD A DRINK FIRST THING IN THE MORNING TO STEADY YOUR NERVES TO GET RID OF A HANGOVER: NO
TOTAL SCORE: 0

## 2020-05-29 ASSESSMENT — ENCOUNTER SYMPTOMS
NAUSEA: 1
TREMORS: 0
FALLS: 0
WHEEZING: 0
SEIZURES: 0
CHILLS: 0
WEAKNESS: 0
COUGH: 0
PND: 0
DIZZINESS: 0
DEPRESSION: 0
SPEECH CHANGE: 0
ABDOMINAL PAIN: 0
SPUTUM PRODUCTION: 0
EYE PAIN: 0
ROS GI COMMENTS: HEMATEMESIS
SHORTNESS OF BREATH: 0
HEARTBURN: 0
PALPITATIONS: 0
VOMITING: 1
WEIGHT LOSS: 0
BLURRED VISION: 0
BACK PAIN: 0
NECK PAIN: 0
HEADACHES: 0
FEVER: 0
CONSTIPATION: 0
DIARRHEA: 0

## 2020-05-29 ASSESSMENT — COGNITIVE AND FUNCTIONAL STATUS - GENERAL
DAILY ACTIVITIY SCORE: 24
MOBILITY SCORE: 24
SUGGESTED CMS G CODE MODIFIER MOBILITY: CH
SUGGESTED CMS G CODE MODIFIER DAILY ACTIVITY: CH

## 2020-05-29 ASSESSMENT — PATIENT HEALTH QUESTIONNAIRE - PHQ9
2. FEELING DOWN, DEPRESSED, IRRITABLE, OR HOPELESS: NOT AT ALL
1. LITTLE INTEREST OR PLEASURE IN DOING THINGS: NOT AT ALL
SUM OF ALL RESPONSES TO PHQ9 QUESTIONS 1 AND 2: 0

## 2020-05-29 ASSESSMENT — FIBROSIS 4 INDEX: FIB4 SCORE: 3.02

## 2020-05-29 NOTE — DISCHARGE INSTRUCTIONS
Please follow up with your primary care physician in 12 hours for abdominal recheck if your symptoms have not completely gone away. Call your primary care physician at the opening of business hours to let them know you were seen in the emergency department. Return immediately if your pain returns or worsens, if you develop any new symptoms, if you are not able to drink fluids, if you have persistent vomiting, if you develop fevers, or if you have any further concerns. Additionally, please return if your symptoms have not resolved and you are unable to follow up with your primary care physician for recheck.    Please call your gastroenterologist, and have him review your CT scan result from today's visit.    We have sent out a test for COVID-19 today.  You will be contacted by phone if that test result is positive.  Please be sure to answer any phone calls from a 100 area code.  Please self quarantine until you receive those results.

## 2020-05-29 NOTE — ED TRIAGE NOTES
RLQ and epigastric pain x several weeks. Was seen at GI yesterday and told to come to ED if worsened. Also reports n/v.    Pt denies respiratory symptoms, fever or known covid exposure.

## 2020-05-29 NOTE — ED TRIAGE NOTES
Sent by GI, patient called office and stated she was vomiting blood, describes 1 emesis with a few dime sized blood clots

## 2020-05-29 NOTE — ED PROVIDER NOTES
"ED Provider Note    CHIEF COMPLAINT  Chief Complaint   Patient presents with   • Vomiting     sent by GI \"vomitting blood\"         [facility]  Lily Nicole is a 24 y.o. female who presents to the ED secondary to vomiting blood.  The patient was seen and evaluated approximately 36 hours ago in the ER secondary to intermittent right lower quadrant abdominal pain.  At that time the patient's hemoglobin was 6.9, the patient has end-stage renal disease and lupus.  This is slightly lower than her baseline.  CT scan was unremarkable.  Today the patient had hemodialysis, came home, got nauseous, vomited straight blood clots.  The patient is still nauseated but has not vomited again.  She called her GI doctor, [facility], and told her to come to the emergency department.  The patient denies any chest pains, does have some mild epigastric abdominal pain, some nausea, no vomiting, no black or bloody bowel movements.    REVIEW OF SYSTEMS  See HPI for further details. All other systems are negative.     PAST MEDICAL HISTORY  Past Medical History:   Diagnosis Date   • Anemia 01/17/2018   • AVF (arteriovenous fistula) ([facility])     Right Arm   • Dialysis patient ([facility])      dialysis, M,W,F Elizabeth/Joni   • ESRD (end stage renal disease) on dialysis ([facility]) 01/17/2018    Twan Fu   • Heart burn    • Hypertension 01/17/2018    \"Controlled with medication\"   • Indigestion    • Lupus ([facility])    • Migraines 01/17/2018   • Seizure ([facility]) 2013    from high blood pressure, reports 1 time event       FAMILY HISTORY  No family history on file.    SOCIAL HISTORY  Social History     Socioeconomic History   • Marital status: Single     Spouse name: Not on file   • Number of children: Not on file   • Years of education: Not on file   • Highest education level: Not on file   Occupational History   • Not on file   Social Needs   • Financial resource strain: Not on file   • Food insecurity     Worry: Not on file     Inability: Not on file   • " "Transportation needs     Medical: Not on file     Non-medical: Not on file   Tobacco Use   • Smoking status: Never Smoker   • Smokeless tobacco: Never Used   Substance and Sexual Activity   • Alcohol use: No   • Drug use: No   • Sexual activity: Not on file   Lifestyle   • Physical activity     Days per week: Not on file     Minutes per session: Not on file   • Stress: Not on file   Relationships   • Social connections     Talks on phone: Not on file     Gets together: Not on file     Attends Congregational service: Not on file     Active member of club or organization: Not on file     Attends meetings of clubs or organizations: Not on file     Relationship status: Not on file   • Intimate partner violence     Fear of current or ex partner: Not on file     Emotionally abused: Not on file     Physically abused: Not on file     Forced sexual activity: Not on file   Other Topics Concern   • Not on file   Social History Narrative   • Not on file       SURGICAL HISTORY  Past Surgical History:   Procedure Laterality Date   • GASTROSCOPY-ENDO  12/9/2019    Procedure: GASTROSCOPY;  Surgeon: Aaron Kam M.D.;  Location: SURGERY [facility] ER;  Service: Gastroenterology   • ANGIOPLASTY  01/17/2018    \"Right Arm AV-Fistulagram & Angioplastyx3\"   • ULI BY LAPAROSCOPY  4/5/2010    Performed by SYED MARTELL at SURGERY [facility]   • AV FISTULA CREATION Right    • OTHER      renal biopsy x 3   • OTHER      bone marrow biopsy       CURRENT MEDICATIONS  Home Medications     Reviewed by Shefali Richey (Pharmacy Tech) on 05/29/20 at 1601  Med List Status: Complete   Medication Last Dose Status   amLODIPine (NORVASC) 10 MG Tab 5/28/2020 Active   atenolol (TENORMIN) 25 MG Tab 5/29/2020 Active   hydroxychloroquine (PLAQUENIL) 200 MG Tab 5/28/2020 Active   mycophenolate sodium (MYFORTIC) 360 MG Tablet Delayed Response tablet 5/28/2020 Active   omeprazole (PRILOSEC OTC) 20 MG tablet 5/29/2020 Active   predniSONE " (DELTASONE) 5 MG Tab 5/28/2020 Active                ALLERGIES  Allergies   Allergen Reactions   • Clindamycin Rash     Hive   • Keflex Rash     Hives   • Metoprolol Nausea       PHYSICAL EXAM  VITAL SIGNS: /85   Pulse 81   Temp 37.2 °C (98.9 °F) (Temporal)   Resp 18   LMP 05/21/2020   SpO2 97%   Constitutional: Well developed, Well nourished, mild distress, chronically ill appearance.   HENT: Normocephalic, Atraumatic, Bilateral external ears normal, Oropharynx clear, No oral exudates, Nose normal.   Eyes: PERRLA, EOMI, Conjunctiva normal, No discharge.   Neck: Normal range of motion, No tenderness, Supple, No stridor.   Lymphatic: No lymphadenopathy noted.   Cardiovascular: Normal heart rate, Normal rhythm.   Thorax & Lungs: Normal breath sounds, No respiratory distress, No wheezing, No chest tenderness.   Abdomen: Mild epigastric abdominal tenderness, no rebound, no peritoneal signs  Skin: Warm, Dry, No erythema, No rash.   Back: No tenderness, No CVA tenderness.   Extremities: Intact distal pulses, No edema, No tenderness.  Fistula in the right bicep area  Neurologic: Alert & oriented x 3, moves all extremities spontaneously.     RADIOLOGY/PROCEDURES  No orders to display       Results for orders placed or performed during the hospital encounter of 05/29/20   CBC WITH DIFFERENTIAL   Result Value Ref Range    WBC 4.1 (L) 4.8 - 10.8 K/uL    RBC 2.95 (L) 4.20 - 5.40 M/uL    Hemoglobin 7.7 (L) 12.0 - 16.0 g/dL    Hematocrit 26.4 (L) 37.0 - 47.0 %    MCV 89.5 81.4 - 97.8 fL    MCH 26.1 (L) 27.0 - 33.0 pg    MCHC 29.2 (L) 33.6 - 35.0 g/dL    RDW 60.8 (H) 35.9 - 50.0 fL    Platelet Count 84 (L) 164 - 446 K/uL    MPV 11.4 9.0 - 12.9 fL    Neutrophils-Polys 78.80 (H) 44.00 - 72.00 %    Lymphocytes 11.70 (L) 22.00 - 41.00 %    Monocytes 7.10 0.00 - 13.40 %    Eosinophils 0.70 0.00 - 6.90 %    Basophils 0.70 0.00 - 1.80 %    Immature Granulocytes 1.00 (H) 0.00 - 0.90 %    Nucleated RBC 0.00 /100 WBC     Neutrophils (Absolute) 3.22 2.00 - 7.15 K/uL    Lymphs (Absolute) 0.48 (L) 1.00 - 4.80 K/uL    Monos (Absolute) 0.29 0.00 - 0.85 K/uL    Eos (Absolute) 0.03 0.00 - 0.51 K/uL    Baso (Absolute) 0.03 0.00 - 0.12 K/uL    Immature Granulocytes (abs) 0.04 0.00 - 0.11 K/uL    NRBC (Absolute) 0.00 K/uL    Hypochromia 1+     Anisocytosis 2+    COMP METABOLIC PANEL   Result Value Ref Range    Sodium 131 (L) 135 - 145 mmol/L    Potassium 4.6 3.6 - 5.5 mmol/L    Chloride 89 (L) 96 - 112 mmol/L    Co2 30 20 - 33 mmol/L    Anion Gap 12.0 7.0 - 16.0    Glucose 90 65 - 99 mg/dL    Bun 15 8 - 22 mg/dL    Creatinine 4.22 (H) 0.50 - 1.40 mg/dL    Calcium 9.4 8.4 - 10.2 mg/dL    AST(SGOT) 87 (H) 12 - 45 U/L    ALT(SGPT) 57 (H) 2 - 50 U/L    Alkaline Phosphatase 95 30 - 99 U/L    Total Bilirubin 0.5 0.1 - 1.5 mg/dL    Albumin 3.9 3.2 - 4.9 g/dL    Total Protein 9.7 (H) 6.0 - 8.2 g/dL    Globulin 5.8 (H) 1.9 - 3.5 g/dL    A-G Ratio 0.7 g/dL   APTT   Result Value Ref Range    APTT 29.2 24.7 - 36.0 sec   PROTHROMBIN TIME (INR)   Result Value Ref Range    PT 13.8 12.0 - 14.6 sec    INR 1.05 0.87 - 1.13   COD (ADULT)   Result Value Ref Range    ABO Grouping Only O     Rh Grouping Only POS     Antibody Screen-Cod NEG     Component R       R99                 Red Cells, LR       [id number]   selected     05/29/20   15:57      Product Type R99     Dispense Status selected     Unit Number (Barcoded) [id number]     Product Code (Barcoded) [id number]     Blood Type (Barcoded) 5100    ESTIMATED GFR   Result Value Ref Range    GFR If  16 (A) >60 mL/min/1.73 m 2    GFR If Non  13 (A) >60 mL/min/1.73 m 2   PLATELET ESTIMATE   Result Value Ref Range    Plt Estimation Decreased    MORPHOLOGY   Result Value Ref Range    RBC Morphology Present     Polychromia 1+     Poikilocytosis 1+     Ovalocytes 1+     Spherocytes 1+    DIFFERENTIAL COMMENT   Result Value Ref Range    Comments-Diff see below    COVID/SARS CoV-2     Specimen: Nasopharyngeal; Respirate   Result Value Ref Range    COVID Order Status Received    SARS-CoV-2, PCR (In-House)   Result Value Ref Range    SARS-CoV-2 Source NP Swab         COURSE & MEDICAL DECISION MAKING  Pertinent Labs & Imaging studies reviewed. (See chart for details)  Patient with hematemesis, history of gastritis.  Give the patient Protonix, originally blood count was 6.9 and therefore I wrote for the patient to receive a transfusion however the patient's hemoglobin came back at 7.7 after hemodialysis today likely hemoconcentrated.  Discussed the case with Dr. Cleaning he will see the patient and scope her tomorrow morning.  Discussed the case with the hospitalist for hospitalization.      FINAL IMPRESSION  1. Hematemesis with nausea    2. Acute gastritis with hemorrhage, unspecified gastritis type        Patient referred to primary care provider for blood pressure management    This dictation was created using voice recognition software. The accuracy of the dictation is limited to the abilities of the software. I expect there may be some errors of grammar and possibly content. The nursing notes were reviewed and certain aspects of this information were incorporated into this note.    Electronically signed by: Branden Swift M.D., 5/29/2020 3:21 PM

## 2020-05-29 NOTE — ED PROVIDER NOTES
ED Provider Note    Chief Complaint:   Abdominal pain    HPI:  Lily Nicole is a 24 y.o. female who presents with chief complaint of abdominal pain.  She has a past medical history significant for lupus, end-stage renal disease, as well as peptic ulcer disease.  She reports intermittent right sided periumbilical pain for the past 3 to 4 months that seems to have worsened over the past week.  She states pain over the past week was still intermittent, but became persistent over the past 24 hours.  She describes a dull aching pain that is exacerbated by eating and drinking, no true alleviating factors identified.  She has no associated dysuria, no suprapubic pain, no low pelvic pain, no associated vomiting.  She has had some nausea and loss of appetite.  She denies any chest pain, denies shortness of breath.  She does have a prior surgical history of cholecystectomy, no prior history of appendectomy.  She has no prior history of similar symptoms.  She was diagnosed with peptic ulcer disease in December, however states her pain at that time was more epigastric than it is today.  Additionally she reports a prior history of pancreatitis, also with pain of different quality at the time.    She is dialyzed Monday, Wednesday, and Friday, she is due for dialysis tomorrow, and did attend dialysis yesterday.    Review of Systems:  See HPI for pertinent positives and negatives. All other systems negative.    Past Medical History:   has a past medical history of Anemia (01/17/2018), AVF (arteriovenous fistula) ([facility]), Dialysis patient ([facility]), ESRD (end stage renal disease) on dialysis ([facility]) (01/17/2018), Heart burn, Hypertension (01/17/2018), Indigestion, Lupus ([facility]), Migraines (01/17/2018), and Seizure ([facility]) (2013).    Social History:  Social History     Tobacco Use   • Smoking status: Never Smoker   • Smokeless tobacco: Never Used   Substance and Sexual Activity   • Alcohol use: No   • Drug use: No   • Sexual activity: Not  "on file       Surgical History:   has a past surgical history that includes ronak by laparoscopy (4/5/2010); av fistula creation (Right); angioplasty (01/17/2018); other; other; and gastroscopy-endo (12/9/2019).    Current Medications:  Home Medications     Reviewed by Tamra Bacon R.N. (Registered Nurse) on 05/28/20 at 2237  Med List Status: Partial   Medication Last Dose Status   acetaminophen (TYLENOL) 500 MG Tab  Active   amLODIPine (NORVASC) 10 MG Tab  Active   atenolol (TENORMIN) 25 MG Tab  Active   hydroxychloroquine (PLAQUENIL) 200 MG Tab  Active   mycophenolate sodium (MYFORTIC) 360 MG Tablet Delayed Response tablet  Active   omeprazole (PRILOSEC OTC) 20 MG tablet  Active   ondansetron (ZOFRAN) 8 MG Tab  Active   predniSONE (DELTASONE) 5 MG Tab  Active                Allergies:  Allergies   Allergen Reactions   • Clindamycin Rash     Hive   • Keflex Rash     Hives   • Metoprolol Nausea       Physical Exam:  Vital Signs: BP (!) 196/88   Pulse 88   Temp 36.1 °C (97 °F)   Resp 18   Ht 1.702 m (5' 7\")   Wt 67.4 kg (148 lb 9.4 oz)   LMP 05/21/2020   SpO2 100%   BMI 23.27 kg/m²   Constitutional: Alert, no acute distress  HENT: Normocephalic  Eyes: Pupils equal and reactive, normal conjunctiva  Neck: Supple, normal range of motion, no stridor  Cardiovascular: Extremities are warm and well perfused, no murmur appreciated, normal cardiac auscultation, normal rate  Pulmonary: No respiratory distress, normal work of breathing, no accessory muscule usage, breath sounds clear and equal bilaterally, no wheezing, no coarse breath sounds  Abdomen: Soft, non-distended, mild discomfort on lower abdominal palpation without rebound or guarding, no localizable right lower quadrant tenderness to palpation, no peritoneal signs  Skin: Warm, dry, no rashes or lesions  Musculoskeletal: Normal range of motion in all extremities, no swelling or deformity noted  Neurologic: Alert, oriented, normal speech, normal motor " function  Psychiatric: Normal and appropriate mood and affect    Medical records reviewed for continuity of care.  No recent medical records available for review.    Labs:  Labs Reviewed   CBC WITH DIFFERENTIAL - Abnormal; Notable for the following components:       Result Value    RBC 2.62 (*)     Hemoglobin 6.9 (*)     Hematocrit 23.6 (*)     MCH 26.3 (*)     MCHC 29.2 (*)     RDW 60.7 (*)     Platelet Count 92 (*)     Neutrophils-Polys 74.40 (*)     Lymphocytes 15.90 (*)     Lymphs (Absolute) 0.84 (*)     All other components within normal limits   COMP METABOLIC PANEL - Abnormal; Notable for the following components:    Sodium 132 (*)     Chloride 89 (*)     Bun 39 (*)     Creatinine 7.18 (*)     Total Protein 8.9 (*)     Globulin 5.2 (*)     All other components within normal limits   URINALYSIS,CULTURE IF INDICATED - Abnormal; Notable for the following components:    Ph 8.5 (*)     Protein 100 (*)     Occult Blood Small (*)     All other components within normal limits   ESTIMATED GFR - Abnormal; Notable for the following components:    GFR If  9 (*)     GFR If Non  7 (*)     All other components within normal limits   LIPASE   HCG QUAL SERUM   URINE MICROSCOPIC (W/UA)   MORPHOLOGY   PLATELET ESTIMATE   DIFFERENTIAL COMMENT   COVID/SARS COV-2       Radiology:  CT-RENAL COLIC EVALUATION(A/P W/O)   Final Result         1. No urinary tract calculus identified. No renal collecting system in dilatation.      2. Atrophic bilateral kidneys.      3. Normal appendix. No evidence of inflammatory change in the abdomen or pelvis.  The study is however limited due to nonuse of intravenous contrast      4. Hazy groundglass opacity in the bilateral lung bases could relate to atelectasis.      There is fluid in the lower esophagus putting patient at risk for aspiration. Questionable mucosal nodule in the lower esophagus (image 18 series 2 and image 37 series 602). Further evaluation with  esophagram or endoscopy on nonemergent basis.           ED Medications Administered:  Medications   morphine (pf) 4 MG/ML injection 4 mg (4 mg Intravenous Given 5/28/20 9089)   ondansetron (ZOFRAN) syringe/vial injection 4 mg (4 mg Intravenous Given 5/28/20 2315)       Differential diagnosis:  Appendicitis, electrolyte abnormality, nonspecific abdominal pain, other intra-abdominal infection, pancreatitis    MDM:  Patient presents with chief complaint of lower abdominal pain for the past 3 months, worsening over the past week and becoming more persistent over the past 24 hours.  On arrival to the emergency department she has no hypotension, no fever, no tachycardia, no evidence of Sirs or sepsis with regard to her vital signs.  She has no peritoneal signs on physical exam, abdominal exam is benign.  She denies any low pelvic pain, no acute sharp pain, less concerning for ovarian torsion.  She has no fevers, no evidence of infection, again without pelvic pain or abnormal vaginal discharge, less concerning for tubo-ovarian abscess.    On laboratory evaluation urinalysis is negative for evidence of infection.  Sodium is slightly low at 132, potassium is within normal limits.  Creatinine is elevated consistent with her history of dialysis dependent end-stage renal disease.  She has a normal white blood count.  Hemoglobin is 6.9 consistent with her prior values over the past 3 months.  hCG is negative.  No sterile pyuria.    She is treated with a small amount of morphine with improvement of symptoms.    CT abdomen pelvis ordered for further evaluation.  Fluid in the lower esophagus noted putting patient at risk for aspiration.  Further evaluation for endoscopy on nonemergent basis is recommended.  Patient did undergo endoscopy 5 months ago.  She is followed by [facility].  Additionally, there is groundglass opacity noted in bilateral lung bases, possibly related to atelectasis.    Patient is scheduled for dialysis  tomorrow.  She is counseled to call her primary care physician, as well as her gastroenterologist this morning for abdominal recheck within 12 to 24 hours.  She is counseled that her gastroenterologist review the results of her visit today. Return precautions were discussed with the patient, and provided in written form with the patient's discharge instructions.  Due to abnormal groundglass opacities on CT, COVID-19 test was sent and is pending at this time.  She has no other symptoms of COVID-19, no history of contacts with COVID-19 positive individuals.  Counseled to self isolate until those results are obtained.  At this time, room air oxygen saturation is 100%, no indication for admission.    Blood pressure today is greater than 120/80, patient is instructed to follow up with primary care provider for blood pressure recheck.    This patient was identified to have imaging concerning for COVID-19.  Personal protective equipment including N95 surgical respirator, goggles, gown, and gloves were used during this encounter.     Disposition:  Discharge home in stable condition    Final Impression:  1. Generalized abdominal pain        Electronically signed by: Sol Castillo MD, 5/29/2020 12:34 AM

## 2020-05-29 NOTE — ED NOTES
Med Rec completed per patient over the phone  Allergies reviewed  No ORAL antibiotics in last 14 days

## 2020-05-29 NOTE — ED NOTES
ERP at bedside. Pt agrees with plan of care discussed by ERP. AIDET acknowledged with patient. Charla in low position, side rail up for pt safety. Call light within reach. Will continue to monitor.

## 2020-05-30 ENCOUNTER — ANESTHESIA (OUTPATIENT)
Dept: SURGERY | Facility: MEDICAL CENTER | Age: 25
DRG: 368 | End: 2020-05-30
Payer: COMMERCIAL

## 2020-05-30 ENCOUNTER — ANESTHESIA EVENT (OUTPATIENT)
Dept: SURGERY | Facility: MEDICAL CENTER | Age: 25
DRG: 368 | End: 2020-05-30
Payer: COMMERCIAL

## 2020-05-30 LAB
ANION GAP SERPL CALC-SCNC: 11 MMOL/L (ref 7–16)
BUN SERPL-MCNC: 25 MG/DL (ref 8–22)
CALCIUM SERPL-MCNC: 9.1 MG/DL (ref 8.4–10.2)
CHLORIDE SERPL-SCNC: 89 MMOL/L (ref 96–112)
CO2 SERPL-SCNC: 29 MMOL/L (ref 20–33)
CREAT SERPL-MCNC: 5.98 MG/DL (ref 0.5–1.4)
ERYTHROCYTE [DISTWIDTH] IN BLOOD BY AUTOMATED COUNT: 57.7 FL (ref 35.9–50)
GLUCOSE SERPL-MCNC: 90 MG/DL (ref 65–99)
HCT VFR BLD AUTO: 27.7 % (ref 37–47)
HGB BLD-MCNC: 7.7 G/DL (ref 12–16)
HGB BLD-MCNC: 8.5 G/DL (ref 12–16)
MCH RBC QN AUTO: 27.3 PG (ref 27–33)
MCHC RBC AUTO-ENTMCNC: 30.7 G/DL (ref 33.6–35)
MCV RBC AUTO: 89.1 FL (ref 81.4–97.8)
PLATELET # BLD AUTO: 63 K/UL (ref 164–446)
PMV BLD AUTO: 11.6 FL (ref 9–12.9)
POTASSIUM SERPL-SCNC: 5.4 MMOL/L (ref 3.6–5.5)
RBC # BLD AUTO: 3.11 M/UL (ref 4.2–5.4)
SODIUM SERPL-SCNC: 129 MMOL/L (ref 135–145)
WBC # BLD AUTO: 2.8 K/UL (ref 4.8–10.8)

## 2020-05-30 PROCEDURE — 96376 TX/PRO/DX INJ SAME DRUG ADON: CPT

## 2020-05-30 PROCEDURE — 500066 HCHG BITE BLOCK, ECT: Performed by: INTERNAL MEDICINE

## 2020-05-30 PROCEDURE — 700111 HCHG RX REV CODE 636 W/ 250 OVERRIDE (IP): Performed by: EMERGENCY MEDICINE

## 2020-05-30 PROCEDURE — 770020 HCHG ROOM/CARE - TELE (206)

## 2020-05-30 PROCEDURE — 96375 TX/PRO/DX INJ NEW DRUG ADDON: CPT

## 2020-05-30 PROCEDURE — 30233N1 TRANSFUSION OF NONAUTOLOGOUS RED BLOOD CELLS INTO PERIPHERAL VEIN, PERCUTANEOUS APPROACH: ICD-10-PCS | Performed by: INTERNAL MEDICINE

## 2020-05-30 PROCEDURE — 160009 HCHG ANES TIME/MIN: Performed by: INTERNAL MEDICINE

## 2020-05-30 PROCEDURE — 700111 HCHG RX REV CODE 636 W/ 250 OVERRIDE (IP): Performed by: INTERNAL MEDICINE

## 2020-05-30 PROCEDURE — 700102 HCHG RX REV CODE 250 W/ 637 OVERRIDE(OP): Performed by: INTERNAL MEDICINE

## 2020-05-30 PROCEDURE — 700101 HCHG RX REV CODE 250: Performed by: ANESTHESIOLOGY

## 2020-05-30 PROCEDURE — A9270 NON-COVERED ITEM OR SERVICE: HCPCS | Performed by: INTERNAL MEDICINE

## 2020-05-30 PROCEDURE — 36430 TRANSFUSION BLD/BLD COMPNT: CPT

## 2020-05-30 PROCEDURE — 160035 HCHG PACU - 1ST 60 MINS PHASE I: Performed by: INTERNAL MEDICINE

## 2020-05-30 PROCEDURE — 700111 HCHG RX REV CODE 636 W/ 250 OVERRIDE (IP): Performed by: ANESTHESIOLOGY

## 2020-05-30 PROCEDURE — 85018 HEMOGLOBIN: CPT

## 2020-05-30 PROCEDURE — 160203 HCHG ENDO MINUTES - 1ST 30 MINS LEVEL 4: Performed by: INTERNAL MEDICINE

## 2020-05-30 PROCEDURE — 160002 HCHG RECOVERY MINUTES (STAT): Performed by: INTERNAL MEDICINE

## 2020-05-30 PROCEDURE — 700105 HCHG RX REV CODE 258: Performed by: INTERNAL MEDICINE

## 2020-05-30 PROCEDURE — P9016 RBC LEUKOCYTES REDUCED: HCPCS

## 2020-05-30 PROCEDURE — 0DC38ZZ EXTIRPATION OF MATTER FROM LOWER ESOPHAGUS, VIA NATURAL OR ARTIFICIAL OPENING ENDOSCOPIC: ICD-10-PCS | Performed by: INTERNAL MEDICINE

## 2020-05-30 PROCEDURE — 85027 COMPLETE CBC AUTOMATED: CPT

## 2020-05-30 PROCEDURE — C9113 INJ PANTOPRAZOLE SODIUM, VIA: HCPCS | Performed by: INTERNAL MEDICINE

## 2020-05-30 PROCEDURE — 99232 SBSQ HOSP IP/OBS MODERATE 35: CPT | Performed by: INTERNAL MEDICINE

## 2020-05-30 PROCEDURE — 80048 BASIC METABOLIC PNL TOTAL CA: CPT

## 2020-05-30 PROCEDURE — 160048 HCHG OR STATISTICAL LEVEL 1-5: Performed by: INTERNAL MEDICINE

## 2020-05-30 RX ORDER — HYDROMORPHONE HYDROCHLORIDE 1 MG/ML
0.5 INJECTION, SOLUTION INTRAMUSCULAR; INTRAVENOUS; SUBCUTANEOUS ONCE
Status: COMPLETED | OUTPATIENT
Start: 2020-05-31 | End: 2020-05-30

## 2020-05-30 RX ORDER — SODIUM CHLORIDE 9 MG/ML
INJECTION, SOLUTION INTRAVENOUS ONCE
Status: COMPLETED | OUTPATIENT
Start: 2020-05-30 | End: 2020-05-30

## 2020-05-30 RX ORDER — DIPHENHYDRAMINE HYDROCHLORIDE 50 MG/ML
25 INJECTION INTRAMUSCULAR; INTRAVENOUS ONCE
Status: COMPLETED | OUTPATIENT
Start: 2020-05-30 | End: 2020-05-30

## 2020-05-30 RX ORDER — ONDANSETRON 2 MG/ML
4 INJECTION INTRAMUSCULAR; INTRAVENOUS
Status: COMPLETED | OUTPATIENT
Start: 2020-05-30 | End: 2020-05-30

## 2020-05-30 RX ORDER — LIDOCAINE HYDROCHLORIDE 20 MG/ML
INJECTION, SOLUTION EPIDURAL; INFILTRATION; INTRACAUDAL; PERINEURAL PRN
Status: DISCONTINUED | OUTPATIENT
Start: 2020-05-30 | End: 2020-05-30 | Stop reason: SURG

## 2020-05-30 RX ORDER — MYCOPHENOLIC ACID 360 MG/1
360 TABLET, DELAYED RELEASE ORAL DAILY
Status: DISCONTINUED | OUTPATIENT
Start: 2020-05-30 | End: 2020-05-31 | Stop reason: HOSPADM

## 2020-05-30 RX ORDER — SUCRALFATE ORAL 1 G/10ML
1 SUSPENSION ORAL EVERY 6 HOURS
Status: DISCONTINUED | OUTPATIENT
Start: 2020-05-30 | End: 2020-05-31 | Stop reason: HOSPADM

## 2020-05-30 RX ADMIN — SUCRALFATE 1 G: 1 SUSPENSION ORAL at 17:44

## 2020-05-30 RX ADMIN — SODIUM CHLORIDE: 9 INJECTION, SOLUTION INTRAVENOUS at 10:46

## 2020-05-30 RX ADMIN — ACETAMINOPHEN 650 MG: 325 TABLET, FILM COATED ORAL at 00:22

## 2020-05-30 RX ADMIN — AMLODIPINE BESYLATE 10 MG: 5 TABLET ORAL at 17:49

## 2020-05-30 RX ADMIN — ONDANSETRON 4 MG: 2 INJECTION INTRAMUSCULAR; INTRAVENOUS at 11:29

## 2020-05-30 RX ADMIN — HYDROMORPHONE HYDROCHLORIDE 0.5 MG: 1 INJECTION, SOLUTION INTRAMUSCULAR; INTRAVENOUS; SUBCUTANEOUS at 17:53

## 2020-05-30 RX ADMIN — PROPOFOL 10 MG: 10 INJECTION, EMULSION INTRAVENOUS at 11:08

## 2020-05-30 RX ADMIN — POVIDONE-IODINE 15 ML: 10 SOLUTION TOPICAL at 10:40

## 2020-05-30 RX ADMIN — ONDANSETRON 4 MG: 4 TABLET, ORALLY DISINTEGRATING ORAL at 23:51

## 2020-05-30 RX ADMIN — PANTOPRAZOLE SODIUM 40 MG: 40 INJECTION, POWDER, LYOPHILIZED, FOR SOLUTION INTRAVENOUS at 17:47

## 2020-05-30 RX ADMIN — PROPOFOL 40 MG: 10 INJECTION, EMULSION INTRAVENOUS at 11:05

## 2020-05-30 RX ADMIN — HYDROMORPHONE HYDROCHLORIDE 0.5 MG: 1 INJECTION, SOLUTION INTRAMUSCULAR; INTRAVENOUS; SUBCUTANEOUS at 23:51

## 2020-05-30 RX ADMIN — PANTOPRAZOLE SODIUM 40 MG: 40 INJECTION, POWDER, LYOPHILIZED, FOR SOLUTION INTRAVENOUS at 06:20

## 2020-05-30 RX ADMIN — HYDROXYCHLOROQUINE SULFATE 200 MG: 200 TABLET, FILM COATED ORAL at 17:46

## 2020-05-30 RX ADMIN — SUCRALFATE 1 G: 1 SUSPENSION ORAL at 23:51

## 2020-05-30 RX ADMIN — DIPHENHYDRAMINE HYDROCHLORIDE 25 MG: 50 INJECTION INTRAMUSCULAR; INTRAVENOUS at 00:18

## 2020-05-30 RX ADMIN — LIDOCAINE HYDROCHLORIDE 40 MG: 20 INJECTION, SOLUTION EPIDURAL; INFILTRATION; INTRACAUDAL; PERINEURAL at 11:06

## 2020-05-30 RX ADMIN — SUCRALFATE 1 G: 1 SUSPENSION ORAL at 13:39

## 2020-05-30 RX ADMIN — PREDNISONE 5 MG: 10 TABLET ORAL at 17:43

## 2020-05-30 RX ADMIN — MYCOPHENOLATE MOFETIL 500 MG: 250 CAPSULE ORAL at 17:43

## 2020-05-30 RX ADMIN — ONDANSETRON 4 MG: 2 INJECTION INTRAMUSCULAR; INTRAVENOUS at 17:41

## 2020-05-30 ASSESSMENT — ENCOUNTER SYMPTOMS
CHILLS: 0
ABDOMINAL PAIN: 0
SHORTNESS OF BREATH: 0
DIARRHEA: 0
NAUSEA: 0
FEVER: 0
HEADACHES: 0
DIZZINESS: 0
NERVOUS/ANXIOUS: 0
VOMITING: 0

## 2020-05-30 ASSESSMENT — PATIENT HEALTH QUESTIONNAIRE - PHQ9
SUM OF ALL RESPONSES TO PHQ9 QUESTIONS 1 AND 2: 0
2. FEELING DOWN, DEPRESSED, IRRITABLE, OR HOPELESS: NOT AT ALL
1. LITTLE INTEREST OR PLEASURE IN DOING THINGS: NOT AT ALL

## 2020-05-30 NOTE — PROGRESS NOTES
Report received from MICHELLE Charles. Pt denies needs at this time. Safety precautions in place. Call light within reach.

## 2020-05-30 NOTE — PROGRESS NOTES
Telemetry Shift Summary    Rhythm SR-ST  HR Range 70s-110s  Ectopy none  Measurements 0.18/0.08/0.40        Normal Values  Rhythm SR  HR Range    Measurements 0.12-0.20 / 0.06-0.10  / 0.30-0.52

## 2020-05-30 NOTE — CARE PLAN
Problem: Infection  Goal: Will remain free from infection  Note: Pt educated on handwashing and hygiene. Standard precautions implemented. Assessed for s/s of infections. VS and labs monitored. Pt verbalized understanding of infection prevention care plan.      Problem: Knowledge Deficit  Goal: Knowledge of disease process/condition, treatment plan, diagnostic tests, and medications will improve  Outcome: PROGRESSING AS EXPECTED  Note: Pt knowledge level assessed. Pt educated on disease process/condition, POC, diagnostic tests, and medications. Pt verbalized understanding of care plan.

## 2020-05-30 NOTE — PROGRESS NOTES
Pt produced approx 50 mLs of bloody/coffee ground emesis. Transfusion consent signed. Pt reports that she typically receives IV benadryl prior to transfusions. Dr. Styles paged for order.

## 2020-05-30 NOTE — OR NURSING
0950: Rcvd report from MICHELLE Hernandes. Will go get pt in time for her procedure.  1016: Brought pt to PACU for pre-op procedures, she tolerated the move well, no pain or nausea, awake and alert, on room air.  1025: Pt up to RR, no assistance needed, steady gait.  1042: Patient allergies and NPO status verified, home medication reconciliation completed and belongings secured. Patient verbalizes understanding of pain scale, expected course of stay and plan of care. Surgical site verified with patient. IV access established.

## 2020-05-30 NOTE — PROGRESS NOTES
Assessment complete. Pt very tired, states she didn't sleep much last night.  Denies any nausea/vomiting/pain at this time.

## 2020-05-30 NOTE — ANESTHESIA PREPROCEDURE EVALUATION
Relevant Problems   CARDIAC   (+) Arteriovenous fistula, acquired (HCC)   (+) Hypertension         (+) Chronic kidney disease (CKD) stage G5/A1, glomerular filtration rate (GFR) less than or equal to 15 mL/min/1.73 square meter and albuminuria creatinine ratio less than 30 mg/g (HCC)   (+) ESRD (end stage renal disease) on dialysis (HCC)      Other   (+) Upper GI bleed       Physical Exam    Airway   Mallampati: II  TM distance: >3 FB  Neck ROM: full       Cardiovascular - normal exam  Rhythm: regular  Rate: normal  (-) murmur     Dental - normal exam           Pulmonary - normal exam  Breath sounds clear to auscultation     Abdominal    Neurological - normal exam                 Anesthesia Plan    ASA 3- EMERGENT   ASA physical status 3 criteria: ESRD undergoing regularly scheduled dialysisASA physical status emergent criteria: acute hemorrhage    Plan - general       Airway plan will be ETT        Induction: intravenous    Postoperative Plan: Postoperative administration of opioids is intended.    Pertinent diagnostic labs and testing reviewed    Informed Consent:    Anesthetic plan and risks discussed with patient.

## 2020-05-30 NOTE — ANESTHESIA QCDR
2019 [facility] Clinical Data Registry (for Quality Improvement)     Postoperative nausea/vomiting risk protocol (Adult = 18 yrs and Pediatric 3-17 yrs)- (430 and 463)  General inhalation anesthetic (NOT TIVA) with PONV risk factors: No  Provision of anti-emetic therapy with at least 2 different classes of agents: N/A  Patient DID NOT receive anti-emetic therapy and reason is documented in Medical Record: N/A    Multimodal Pain Management- (477)  Non-emergent surgery AND patient age >= 18: No  Use of Multimodal Pain Management, two or more drugs and/or interventions, NOT including systemic opioids:   Exception: Documented allergy to multiple classes of analgesics:     Smoking Abstinence (404)  Patient is current smoker (cigarette, pipe, e-cig, marijuanna): No  Elective Surgery:   Abstinence instructions provided prior to day of surgery:   Patient abstained from smoking on day of surgery:     Pre-Op Beta-Blocker in Isolated CABG (44)  Isolated CABG AND patient age >= 18: No  Beta-blocker admin within 24 hours of surgical incision:   Exception:of medical reason(s) for not administering beta blocker within 24 hours prior to surgical incision (e.g., not  indicated,other medical reason):     PACU assessment of acute postoperative pain prior to Anesthesia Care End- Applies to Patients Age = 18- (ABG7)  Initial PACU pain score is which of the following: < 7/10  Patient unable to report pain score: N/A    Post-anesthetic transfer of care checklist/protocol to PACU/ICU- (426 and 427)  Upon conclusion of case, patient transferred to which of the following locations: PACU/Non-ICU  Use of transfer checklist/protocol:   Exclusion: Service Performed in Patient Hospital Room (and thus did not require transfer):   Unplanned admission to ICU related to anesthesia service up through end of PACU care- (MD51)  Unplanned admission to ICU (not initially anticipated at anesthesia start time): No

## 2020-05-30 NOTE — PROGRESS NOTES
Hospital Medicine Daily Progress Note    Date of Service  5/30/2020    Chief Complaint  Hematemesis    Hospital Course    *Chronically ill 24-year-old female with history of lupus on immunosuppression with end-stage renal disease on hemodialysis presents with hematemesis.  Required transfusion overnight underwent EGD showing Karen-Keyes tear, esophagitis, gastritis, duodenitis*      Interval Problem Update  Seen post PACU  Plan of care reviewed  Carafate might be an issue due to the aluminum, discussed with patient nurse at bedside.  Nephrology contacted and awaiting response.  Patient has no pain or other complaints.    Consultants/Specialty  GI consultants  Nephrology    Code Status  Full    Disposition  Home    Review of Systems  Review of Systems   Constitutional: Negative for chills and fever.   HENT: Negative for congestion.    Respiratory: Negative for shortness of breath.    Cardiovascular: Negative for chest pain.   Gastrointestinal: Negative for abdominal pain, diarrhea, nausea and vomiting.   Skin: Negative for itching and rash.   Neurological: Negative for dizziness and headaches.   Psychiatric/Behavioral: The patient is not nervous/anxious.         Physical Exam  Temp:  [36.6 °C (97.8 °F)-37.2 °C (98.9 °F)] 36.6 °C (97.9 °F)  Pulse:  [] 77  Resp:  [16-24] 22  BP: (109-139)/(72-91) 121/82  SpO2:  [62 %-100 %] 96 %    Physical Exam  Vitals signs and nursing note reviewed.   Constitutional:       General: She is not in acute distress.     Appearance: She is normal weight. She is ill-appearing (chronically). She is not toxic-appearing or diaphoretic.   HENT:      Head: Normocephalic and atraumatic.      Right Ear: External ear normal.      Left Ear: External ear normal.      Nose: Nose normal.      Mouth/Throat:      Mouth: Mucous membranes are moist.      Pharynx: Oropharynx is clear.   Eyes:      Extraocular Movements: Extraocular movements intact.      Pupils: Pupils are equal, round, and  reactive to light.      Comments: Pale conjunctivae   Cardiovascular:      Rate and Rhythm: Normal rate and regular rhythm.   Pulmonary:      Effort: Pulmonary effort is normal.      Breath sounds: Normal breath sounds.   Abdominal:      General: Abdomen is flat. Bowel sounds are normal.      Palpations: Abdomen is soft.   Skin:     Coloration: Skin is not pale (complexion masks pallor).      Comments: Numerous scars   Neurological:      General: No focal deficit present.      Mental Status: She is alert and oriented to person, place, and time. Mental status is at baseline.      Cranial Nerves: No cranial nerve deficit.      Motor: No weakness.   Psychiatric:         Mood and Affect: Mood normal.         Fluids    Intake/Output Summary (Last 24 hours) at 5/30/2020 1237  Last data filed at 5/30/2020 1144  Gross per 24 hour   Intake 650 ml   Output 1 ml   Net 649 ml       Laboratory  Recent Labs     05/28/20 2300 05/29/20 1437 05/29/20  2236 05/30/20  0633   WBC 5.3 4.1*  --  2.8*   RBC 2.62* 2.95*  --  3.11*   HEMOGLOBIN 6.9* 7.7* 6.8* 8.5*   HEMATOCRIT 23.6* 26.4*  --  27.7*   MCV 90.1 89.5  --  89.1   MCH 26.3* 26.1*  --  27.3   MCHC 29.2* 29.2*  --  30.7*   RDW 60.7* 60.8*  --  57.7*   PLATELETCT 92* 84*  --  63*   MPV 10.8 11.4  --  11.6     Recent Labs     05/28/20 2300 05/29/20 1437 05/30/20  0633   SODIUM 132* 131* 129*   POTASSIUM 4.9 4.6 5.4   CHLORIDE 89* 89* 89*   CO2 29 30 29   GLUCOSE 87 90 90   BUN 39* 15 25*   CREATININE 7.18* 4.22* 5.98*   CALCIUM 9.1 9.4 9.1     Recent Labs     05/29/20 1437   APTT 29.2   INR 1.05               Imaging  No orders to display        Assessment/Plan  * Upper GI bleed  Assessment & Plan  1) Karen Keyes Syndrome/Tear- source of the patient's hematemesis  2) Esophagitis  3) Gastritis  4) Duodenitis    Carafate, IV PPI, full liquids.    Patient has been ruled to inpatient status due to GI bleed with anemia requiring transfusion and need for IV medication for  treatment    ESRD (end stage renal disease) on dialysis (HCC)- (present on admission)  Assessment & Plan  Stable continue follow-up as outpatient  Continue HD    Normocytic anemia- (present on admission)  Assessment & Plan  Hemoglobin initially 7.7 fell to 6.7 patient received transfusion  EGD:  1) Karen Keyes Syndrome/Tear- source of the patient's hematemesis  2) Esophagitis  3) Gastritis  4) Duodenitis    Lupus (HCC)- (present on admission)  Assessment & Plan  Follow-up as outpatient  Continue steroid and CellCept, Plaquenil    Hypertension- (present on admission)  Assessment & Plan  Blood pressure controlled continue atenolol and amlodipine         VTE prophylaxis: SCDs

## 2020-05-30 NOTE — OR SURGEON
Immediate Post OP Note    PreOp Diagnosis: hematemesis, anemia    PostOp Diagnosis: Karen Keyes Tear, Gastritis and duodenitis, esophagitis     Procedure(s):  GASTROSCOPY - Wound Class: None    Surgeon(s):  Waylon Mcqueen M.D.    Anesthesiologist/Type of Anesthesia:  Anesthesiologist: Tobey Gansert, M.D./General    Surgical Staff:  Circulator: Kristine Zimmerman R.N.  Endoscopy Technician: Lizett Gloria    Specimens removed if any:  * No specimens in log *    Estimated Blood Loss: < 5 cc    Findings: Karen Keyes Tear in distal esophagus at the GE junction, non bleeding.  Some clot burden, initially in the distal esophagus but pushed into the stomach.  The gastric mucosa was diffusely edematous with scattered erosions in antrum.  No esophageal nor gastric varices.  There was some fluid and clot within the dependent portion of the stomach, removed to best of ability.  The duodenum (first, second and third portions) were normal with clear to yellow bile flowing.     Complications: no immediate    IMPRESSION(S):  1) Karen Keyes Syndrome/Tear- source of the patient's hematemesis  2) Esophagitis  3) Gastritis  4) Duodenitis    Plan/Recommendations:  1) PPI therapy bid intravenously  2) Sucralfate solution three times daily   3) Anti-emetics  4) Full liquid diet  5) Monitor labs        5/30/2020 11:15 AM Waylon Mcqueen M.D.

## 2020-05-30 NOTE — ANESTHESIA TIME REPORT
Anesthesia Start and Stop Event Times     Date Time Event    5/30/2020 1044 Ready for Procedure     1059 Anesthesia Start     1120 Anesthesia Stop        Responsible Staff  05/30/20    Name Role Begin End    Tobey Gansert, M.D. Anesth 1059 1120        Preop Diagnosis (Free Text):  Pre-op Diagnosis     GI bleed. nida keyes tear, gastritis, esophagitis        Preop Diagnosis (Codes):    Post op Diagnosis  Nida-Keyes tear      Premium Reason  E. Weekend    Comments:

## 2020-05-30 NOTE — H&P
"Hospital Medicine History & Physical Note    Date of Service  5/29/2020    Primary Care Physician  Ella Matthew M.D.    Code Status  Full Code    Chief Complaint  Chief Complaint   Patient presents with   • Vomiting     sent by GI \"vomitting blood\"       History of Presenting Illness  24 y.o. female who presented 5/29/2020 with possible medical history of hypertension, lupus, presented with complaint of nausea vomiting blood.  Apparently patient has end-stage renal disease and currently on dialysis.  Patient also on lupus medication.  Patient has been noticing to have nausea vomiting with blood in vomitus today and came to ER for evaluation.  Patient previously was seen by GI for scope last year.  GI was consulted and recommend EGD tomorrow.  Patient complains of general body achiness. Patient's pain is epigastric mild 3-5/10, intermittent and does not radiate to other location, sharp and with some tingling. Can be controlled by pain meds.   Patient otherwise denies fever, chills, nausea, vomiting, adb pain, SOB, CP, headache, constipation, diarrhea, cough, or sputum.    Review of Systems  Review of Systems   Constitutional: Negative for chills, fever and weight loss.   HENT: Negative for congestion, ear discharge, ear pain, hearing loss and nosebleeds.    Eyes: Negative for blurred vision and pain.   Respiratory: Negative for cough, sputum production, shortness of breath and wheezing.    Cardiovascular: Negative for chest pain, palpitations, leg swelling and PND.   Gastrointestinal: Positive for nausea and vomiting. Negative for abdominal pain, constipation, diarrhea and heartburn.        Hematemesis   Genitourinary: Negative for dysuria, frequency and hematuria.   Musculoskeletal: Negative for back pain, falls, joint pain and neck pain.   Skin: Negative for rash.   Neurological: Negative for dizziness, tremors, speech change, seizures, weakness and headaches.   Psychiatric/Behavioral: Negative for depression, " substance abuse and suicidal ideas.       Past Medical History   has a past medical history of Anemia (01/17/2018), AVF (arteriovenous fistula) ([facility]), Dialysis patient ([facility]), ESRD (end stage renal disease) on dialysis ([facility]) (01/17/2018), Heart burn, Hypertension (01/17/2018), Indigestion, Lupus ([facility]), Migraines (01/17/2018), and Seizure ([facility]) (2013).    Surgical History   has a past surgical history that includes ronak by laparoscopy (4/5/2010); av fistula creation (Right); angioplasty (01/17/2018); other; other; and gastroscopy-endo (12/9/2019).     Family History  Family history reviewed , felt nonpertinent to this encounter.       Social History   reports that she has never smoked. She has never used smokeless tobacco. She reports that she does not drink alcohol or use drugs.    Allergies  Allergies   Allergen Reactions   • Clindamycin Rash     Hive   • Keflex Rash     Hives   • Metoprolol Nausea       Medications  Prior to Admission Medications   Prescriptions Last Dose Informant Patient Reported? Taking?   amLODIPine (NORVASC) 10 MG Tab 5/28/2020 at PM Patient Yes No   Sig: Take 10 mg by mouth every evening.   atenolol (TENORMIN) 25 MG Tab 5/29/2020 at 0800 Patient No No   Sig: Take 1 tablet by mouth once daily   hydroxychloroquine (PLAQUENIL) 200 MG Tab 5/28/2020 at PM Patient Yes No   Sig: Take 200 mg by mouth every evening.   mycophenolate sodium (MYFORTIC) 360 MG Tablet Delayed Response tablet 5/28/2020 at PM Patient Yes No   Sig: Take 360 mg by mouth every evening.   omeprazole (PRILOSEC OTC) 20 MG tablet 5/29/2020 at 0800 Patient No No   Sig: Take 1 Tab by mouth 2 times a day. Indications: Heartburn   predniSONE (DELTASONE) 5 MG Tab 5/28/2020 at PM Patient Yes No   Sig: Take 5 mg by mouth every evening.      Facility-Administered Medications: None       Physical Exam  Temp:  [36.1 °C (97 °F)-37.2 °C (98.9 °F)] 37.2 °C (98.9 °F)  Pulse:  [63-90] 78  Resp:  [18-19] 18  BP: (111-196)/(75-92) 121/78  SpO2:   [62 %-100 %] 98 %    Physical Exam  Vitals signs and nursing note reviewed.   Constitutional:       General: She is not in acute distress.     Appearance: Normal appearance. She is normal weight.   HENT:      Head: Normocephalic and atraumatic.      Right Ear: Tympanic membrane, ear canal and external ear normal.      Left Ear: Tympanic membrane, ear canal and external ear normal.      Nose: Nose normal.      Mouth/Throat:      Mouth: Mucous membranes are moist.      Pharynx: Oropharynx is clear.   Eyes:      Extraocular Movements: Extraocular movements intact.      Conjunctiva/sclera: Conjunctivae normal.      Pupils: Pupils are equal, round, and reactive to light.   Neck:      Musculoskeletal: Normal range of motion and neck supple. No neck rigidity.      Vascular: No carotid bruit.   Cardiovascular:      Rate and Rhythm: Normal rate and regular rhythm.      Pulses: Normal pulses.      Heart sounds: Normal heart sounds. No murmur. No friction rub. No gallop.    Pulmonary:      Effort: Pulmonary effort is normal. No respiratory distress.      Breath sounds: Normal breath sounds. No stridor. No wheezing, rhonchi or rales.   Chest:      Chest wall: No tenderness.   Abdominal:      General: Abdomen is flat. Bowel sounds are normal. There is no distension.      Palpations: Abdomen is soft. There is no mass.      Tenderness: There is no abdominal tenderness. There is no guarding or rebound.      Hernia: No hernia is present.   Musculoskeletal: Normal range of motion.         General: No swelling.   Lymphadenopathy:      Cervical: No cervical adenopathy.   Skin:     General: Skin is warm.      Capillary Refill: Capillary refill takes more than 3 seconds.      Coloration: Skin is not jaundiced or pale.   Neurological:      General: No focal deficit present.      Mental Status: She is alert and oriented to person, place, and time. Mental status is at baseline.   Psychiatric:         Mood and Affect: Mood normal.          Behavior: Behavior normal.         Laboratory:  Recent Labs     05/28/20 2300 05/29/20  1437   WBC 5.3 4.1*   RBC 2.62* 2.95*   HEMOGLOBIN 6.9* 7.7*   HEMATOCRIT 23.6* 26.4*   MCV 90.1 89.5   MCH 26.3* 26.1*   MCHC 29.2* 29.2*   RDW 60.7* 60.8*   PLATELETCT 92* 84*   MPV 10.8 11.4     Recent Labs     05/28/20 2300 05/29/20  1437   SODIUM 132* 131*   POTASSIUM 4.9 4.6   CHLORIDE 89* 89*   CO2 29 30   GLUCOSE 87 90   BUN 39* 15   CREATININE 7.18* 4.22*   CALCIUM 9.1 9.4     Recent Labs     05/28/20 2300 05/29/20  1437   ALTSGPT 13 57*   ASTSGOT 30 87*   ALKPHOSPHAT 65 95   TBILIRUBIN 0.4 0.5   LIPASE 34  --    GLUCOSE 87 90     Recent Labs     05/29/20  1437   APTT 29.2   INR 1.05     No results for input(s): NTPROBNP in the last 72 hours.      No results for input(s): TROPONINT in the last 72 hours.    Imaging:  No orders to display         Assessment/Plan:      * Upper GI bleed  Assessment & Plan  Patient complains of nausea vomiting blood  In the ER patient's hemoglobin remained stable  GI was consulted and recommend patient to be on Protonix IV twice daily  Plan for EGD tomorrow  N.p.o. currently.  Monitor hemoglobin every 8 hours  Transfuse if hemoglobin below 7    ESRD (end stage renal disease) on dialysis (HCC)- (present on admission)  Assessment & Plan  Stable continue follow-up as outpatient  Continue HD    Normocytic anemia- (present on admission)  Assessment & Plan  Hemoglobin 7.7  Questionable upper GI bleed  EGD planned for tomorrow  Protonix IV twice daily    Lupus (HCC)- (present on admission)  Assessment & Plan  Follow-up as outpatient  Continue steroid and CellCept, Plaquenil    Hypertension- (present on admission)  Assessment & Plan  Blood pressure controlled continue atenolol and amlodipine    DVT prophylaxis SCD.  Contraindicated for GIB  Anticipated hospital stay less than 2 midnights.  Patient is appropriate for observation status.

## 2020-05-30 NOTE — PROGRESS NOTES
Telemetry Shift Summary    Rhythm sr  HR Range 71-80  Ectopy -  Measurements 0.18/0.08/0.36        Normal Values  Rhythm SR  HR Range    Measurements 0.12-0.20 / 0.06-0.10  / 0.30-0.52

## 2020-05-30 NOTE — PROGRESS NOTES
Pt oriented to room. Admit profile, med rec, and assessment completed. Pt medicated for pain and nausea per MAR. Due meds given per MAR. POC discussed. Questions answered. Non-skid socks in use. Safety precautions in place. Call light in reach. Pt instructed to call for assistance.

## 2020-05-30 NOTE — ASSESSMENT & PLAN NOTE
1) Karen Keyes Syndrome/Tear- source of the patient's hematemesis  2) Esophagitis  3) Gastritis  4) Duodenitis    IV PPI, full liquids.  Short-term Carafate okay per nephrology  Hemoglobin stable overnight she is tolerating full liquids  Await GI reassessment

## 2020-05-30 NOTE — CARE PLAN
Problem: Communication  Goal: The ability to communicate needs accurately and effectively will improve  Outcome: PROGRESSING AS EXPECTED  Pt able to communicate needs and questions effectively.    Problem: Safety  Goal: Will remain free from injury  Outcome: PROGRESSING AS EXPECTED   Pt ambulates steadily,  socks on, area free from clutter

## 2020-05-30 NOTE — PROGRESS NOTES
Pt back from EGD. Vitals stable. Pt denies pain or nausea. On full liquid renal diet, tolerating po

## 2020-05-30 NOTE — PROGRESS NOTES
2 RN Skin Check    2 RN skin check complete w/ Celia RN.   Devices in place: none.  Skin assessed under devices: N\A.  Confirmed pressure ulcers found on: n/a.  New potential pressure ulcers noted on n/a. Wound consult placed N/A.  The following interventions in place Pt turns self side to side. Pillows for support..

## 2020-05-30 NOTE — ASSESSMENT & PLAN NOTE
Hemoglobin initially 7.7 fell to 6.7 patient received transfusion  EGD:  1) Karen Keyes Syndrome/Tear- source of the patient's hematemesis  2) Esophagitis  3) Gastritis  4) Duodenitis

## 2020-05-30 NOTE — ANESTHESIA POSTPROCEDURE EVALUATION
Patient: Lily Nicole    Procedure Summary     Date:  05/30/20 Room / Location:   OR 03 / SURGERY [facility]    Anesthesia Start:  1059 Anesthesia Stop:  1120    Procedure:  GASTROSCOPY (N/A Abdomen) Diagnosis:  (nida thao tear, gastritis, esophagitis)    Surgeon:  Waylon Mcqueen M.D. Responsible Provider:  Tobey Gansert, M.D.    Anesthesia Type:  general ASA Status:  3 - Emergent          Final Anesthesia Type: general  Last vitals  BP   Blood Pressure: 121/82    Temp   36.6 °C (97.9 °F)    Pulse   Pulse: 77   Resp   (!) 22    SpO2   96 %      Anesthesia Post Evaluation    Patient location during evaluation: PACU  Patient participation: complete - patient participated  Level of consciousness: awake and alert    Airway patency: patent  Anesthetic complications: no  Cardiovascular status: hemodynamically stable  Respiratory status: acceptable  Hydration status: euvolemic    PONV: none           Nurse Pain Score: 2 (NPRS)

## 2020-05-30 NOTE — CONSULTS
"DATE OF SERVICE:  05/29/2020    GASTROENTEROLOGY CONSULTATION    REASON FOR CONSULTATION:  Hematemesis.    IDENTIFICATION:  A 24-year-old female.    CHIEF COMPLAINT:  \"I threw out blood.\"    HISTORY OF PRESENT ILLNESS:  History was obtained via interview of the   patient; discussion with Arthur Leon, nurse practitioner; discussion with the   emergency department attending and review of records.  This patient is   followed at our practice.  She has a history of gastritis and oozing   gastropathy based on EGD in December by Dr. Kam of our practice.    Duodenum was normal.  The patient started on omeprazole twice daily at that   time.  She has had heartburn prior to December, but none since starting the   proton pump inhibitor.  She denies dysphagia, no upper abdominal pain.  She   has had some right lower quadrant pain and was actually seen here in the   emergency department yesterday.  Per review of notes, the pain has been   present for about 3-4 months.  Eating may increase the pain, but this is   inconsistent.  Topical heating pad seems to help the pain.  It is described as   dull, but sometimes crampy.  She has had a previous cholecystectomy, but no   appendectomy.  CT renal colic evaluation yesterday showed no kidney stones,   atrophic bilateral kidneys, normal appendix without evidence of inflammatory   changes, hazy ground glass opacity in the bilateral lung bases.  COVID testing   was ordered and the patient was discharged home.  She presented to clinic   today and saw Arthur Leon.  He noted that her hemoglobin was down at 6.9.  He   recommended she come back to the emergency department here, her repeat   hemoglobin is 7.7.  She did have an episode of hematemesis after dialysis this   morning.  She frequently gets nausea and vomiting after her dialysis.  No   nonsteroidal anti-inflammatory type medication use.    Patient was noted to have mild aminotransferase elevations today.  These were   normal " yesterday.  She denies any known history of liver disease.  No alcohol   consumption.  No herbal remedies recently.  No known family history of liver   disease.    ALLERGIES:  CLINDAMYCIN, KEFLEX AND METOPROLOL.    MEDICATIONS:  Home medications include atenolol, amlodipine,   hydroxychloroquine, omeprazole, prednisone, mycophenolate.    PAST MEDICAL HISTORY:  1.  Lupus nephritis.  2.  End-stage renal disease -- see #1.  3.  Hypertension.  4.  History of seizure disorder.  5.  Migraines.  6.  GERD.    PAST SURGICAL HISTORY:  1.  Laparoscopic cholecystectomy in 2010.  2.  AV fistula creation.  3.  EGD in 12/2019.    SOCIAL HISTORY:  No tobacco, drugs, or alcohol.  She lives in [place].    FAMILY HISTORY:  Noncontributory, specifically no luminal GI disease, liver   disease, or pancreatic disease.    REVIEW OF SYSTEMS:  See the HPI.  Otherwise, all systems negative per CMS   criteria.  Specifically, no fever, sore throat, cough, or loss of sensation of   taste or smell reported in this era of COVID-19 pandemic.    PHYSICAL EXAMINATION:  GENERAL:  No immediate distress, friendly, cooperative, and appropriate.  VITAL SIGNS:  Afebrile, heart rate 95 and regular, respiratory rate 18, oxygen   saturation 95% on room air, blood pressure 120/80.  HEENT:  Normocephalic and atraumatic.  Sclerae anicteric.  Conjunctivae pink.  OROPHARYNX:  Moist and clear.  Mallampati score is 3.  NECK:  No thyroid abnormality or lymphadenopathy.  LUNGS:  Clear to auscultation bilaterally without wheezes, rales, or rhonchi.  ABDOMEN:  Bowel sounds present, soft, nontender, nondistended.  EXTREMITIES:  No clubbing, cyanosis, or edema.  SKIN:  No jaundice, spider angiomata, or palmar erythema.  EXTREMITIES:  No clubbing, cyanosis, or edema.   AV fistula in the right upper   extremity.  Good thrill on palpation.  NEUROLOGIC:  Grossly nonfocal.  Alert and oriented.  PSYCHIATRIC:  Affect is appropriate.    LABORATORY DATA:  White blood cell count is  4100, hemoglobin 7.7, hematocrit   26.4, platelet count 84,000.  Sodium 131, BUN 15, creatinine 4.22, AST 87, ALT   57, total bilirubin 0.5, alkaline phosphatase 95.    IMAGING:  See the HPI.    IMPRESSION:  1.  Hematemesis -- small volume, likely gastritis versus esophagitis.    Consider Karen-Keyes tear, less likely would be arteriovenous malformation   or Karen-Keyes tear.  Plan for EGD with anesthesiologist assistance   nonurgently as she is hemodynamically stable.  2.  Nausea with vomiting -- likely secondary to her hemodialysis.  Consider   possibility of gastroparesis or other metabolic abnormality contributing.    Consider medication side effects.  3.  Abdominal pain, right lower quadrant -- unclear etiology, monitor for the   time being.  4.  Gastritis.  5.  Anemia with component of acute gastrointestinal blood loss and chronic   anemia.  6.  Thrombocytopenia.  7.  Abnormal hepatic function panel with elevated aminotransferases --   etiology is unclear.  We will monitor.  8.  End-stage renal disease.  9.  Hypertension.  10.  Gastroesophageal reflux disease.    PLAN AND RECOMMENDATIONS:  1.  Admission to the hospital by the hospitalist with gastroenterology   following closely.  2.  Proton pump inhibitor therapy twice daily.  3.  Clear liquid diet now without reds.  N.p.o. after midnight.  4.  Tentatively plan for EGD tomorrow morning at 11:00 a.m. with   anesthesiologist assistance.  5.  Morning CMP and CBC.  6.  I will defer to the hospitalist for management of the patient's other   comorbid conditions.       ____________________________________     MD AMBER CROOKS / MADHU    DD:  05/29/2020 17:11:41  DT:  05/29/2020 19:18:05    D#:  [id number]  Job#:  [id number]    cc: HERMES Rhoades MD

## 2020-05-30 NOTE — PROGRESS NOTES
Report given to MICHELLE Alicea. Pt denies needs at this time. Safety precautions in place. Call light within reach.

## 2020-05-30 NOTE — OR NURSING
1118: To PACU post EGD. Pt is extubated, breathing spontaneous and unlabored.  1125: Pt more awake. States having nausea, see MAR.  1135: Nausea is better, has slight abdominal pain (pre-existing), tolerable.  1149: Nausea resolved, pain remains tolerable. Meets criteria for transfer to room.

## 2020-05-30 NOTE — PROCEDURES
DATE OF SERVICE:  05/30/2020    PROCEDURE PERFORMED:  Esophagogastroduodenoscopy with removal of esophageal   foreign body.    INDICATIONS:  Hematemesis and nausea and vomiting, and anemia.    CONSENT:  Full RBA discussion held prior to the procedure and signed witnessed   consent form placed on the chart.    INSTRUMENT UTILIZED:  Olympus flexible forward-viewing gastroscope.    ANESTHESIA AND SEDATION:  Provided by anesthesiologist, Dr. Gansert.    TOLERANCE:  Excellent.    PATHOLOGY SPECIMENS:  None.    PROCEDURAL DETAIL:  After adequate sedation, the Olympus flexible   forward-viewing gastroscope was advanced per the oral route into the   esophagus.  There was immediately noted to be a large burden of clot within   the distal esophagus.  This was removed via gentle pressure utilizing the   gastroscope.  The clot was pushed into the dependent portion of the stomach.    There was additional clot noted in the dependent portion of the stomach and   some liquid.  This was suctioned and removed.  The instrument was pulled back   into the esophagus.  There was noted to be an approximately 5 mm mucosal rent   at the gastroesophageal junction, located about 37 cm from the incisors   consistent with a Karen-Keyes tear.  This was not bleeding at this time.    There was also noted to be [place] classification grade C esophagitis in   the distal esophagus.  The instrument was passed into the stomach, air was   insufflated and the gastric mucosa inspected including a retroflexed view of   the gastric cardia.  There were no gastric nor esophageal varices noted.    Retroflexion was taken down.  The gastric mucosa was diffusely erythematous   and edematous with a few scattered red spots and erosions, particularly within   the antrum.  Instrument was passed through the patent pyloric channel into   the duodenum.  The first, second and third portions of the duodenum were   unremarkable.  There was clear-to-yellow bile flowing  per the ampullary   orifice intermittently.    COMPLICATIONS:  No immediate complications.    IMPRESSIONS AND FINDINGS:  1.  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  2.  Esophageal foreign body -- clot.  Status post removal from the esophagus.  3.  Gastritis, suspected.  4.  Esophagitis.  5.  Duodenitis, suspected.    PLAN AND RECOMMENDATIONS:  1.  Observe for any adverse events from this procedure.  2.  Proton pump inhibitor therapy twice daily.  3.  Antiemetics.  4.  Sucralfate solution 3 times daily for 2 weeks.  5.  Monitor hemoglobin and hematocrit.  6.  Anticipate discharge to home tomorrow if stable without evidence of   recurrent overt bleeding.  7.  We will arrange for followup in our clinic.       ____________________________________     MD AMBER CROOKS / NTS    DD:  05/30/2020 11:23:25  DT:  05/30/2020 13:00:23    D#:  [id number]  Job#:  [id number]    cc: HILDA JONES MD, Tobey Gansert MD

## 2020-05-30 NOTE — PROGRESS NOTES
Report given to Meggan MARTINEZ. POC discussed. Pt resting comfortably in bed. Safety precautions in place.

## 2020-05-31 VITALS
DIASTOLIC BLOOD PRESSURE: 95 MMHG | RESPIRATION RATE: 17 BRPM | SYSTOLIC BLOOD PRESSURE: 141 MMHG | OXYGEN SATURATION: 98 % | HEIGHT: 67 IN | BODY MASS INDEX: 24.74 KG/M2 | TEMPERATURE: 98.3 F | HEART RATE: 74 BPM | WEIGHT: 157.63 LBS

## 2020-05-31 LAB
HGB BLD-MCNC: 7.9 G/DL (ref 12–16)
HGB BLD-MCNC: 8.6 G/DL (ref 12–16)

## 2020-05-31 PROCEDURE — C9113 INJ PANTOPRAZOLE SODIUM, VIA: HCPCS | Performed by: INTERNAL MEDICINE

## 2020-05-31 PROCEDURE — A9270 NON-COVERED ITEM OR SERVICE: HCPCS | Performed by: INTERNAL MEDICINE

## 2020-05-31 PROCEDURE — 700102 HCHG RX REV CODE 250 W/ 637 OVERRIDE(OP): Performed by: INTERNAL MEDICINE

## 2020-05-31 PROCEDURE — 700101 HCHG RX REV CODE 250: Performed by: INTERNAL MEDICINE

## 2020-05-31 PROCEDURE — 700111 HCHG RX REV CODE 636 W/ 250 OVERRIDE (IP): Performed by: INTERNAL MEDICINE

## 2020-05-31 PROCEDURE — 99239 HOSP IP/OBS DSCHRG MGMT >30: CPT | Performed by: INTERNAL MEDICINE

## 2020-05-31 PROCEDURE — 85018 HEMOGLOBIN: CPT

## 2020-05-31 RX ORDER — ONDANSETRON 4 MG/1
4 TABLET, ORALLY DISINTEGRATING ORAL EVERY 4 HOURS PRN
Qty: 10 TAB | Refills: 0 | Status: SHIPPED | OUTPATIENT
Start: 2020-05-31 | End: 2020-09-16

## 2020-05-31 RX ORDER — PANTOPRAZOLE SODIUM 40 MG/1
40 TABLET, DELAYED RELEASE ORAL 2 TIMES DAILY
Qty: 30 TAB | Refills: 0 | Status: SHIPPED | OUTPATIENT
Start: 2020-05-31 | End: 2020-07-01

## 2020-05-31 RX ADMIN — ONDANSETRON 4 MG: 4 TABLET, ORALLY DISINTEGRATING ORAL at 12:29

## 2020-05-31 RX ADMIN — MYCOPHENILIC ACID 360 MG: 360 TABLET, DELAYED RELEASE ORAL at 05:34

## 2020-05-31 RX ADMIN — ONDANSETRON 4 MG: 4 TABLET, ORALLY DISINTEGRATING ORAL at 05:29

## 2020-05-31 RX ADMIN — ACETAMINOPHEN 650 MG: 325 TABLET, FILM COATED ORAL at 12:29

## 2020-05-31 RX ADMIN — SUCRALFATE 1 G: 1 SUSPENSION ORAL at 11:45

## 2020-05-31 RX ADMIN — PANTOPRAZOLE SODIUM 40 MG: 40 INJECTION, POWDER, LYOPHILIZED, FOR SOLUTION INTRAVENOUS at 05:30

## 2020-05-31 RX ADMIN — SUCRALFATE 1 G: 1 SUSPENSION ORAL at 05:33

## 2020-05-31 RX ADMIN — ATENOLOL 25 MG: 25 TABLET ORAL at 05:33

## 2020-05-31 ASSESSMENT — COGNITIVE AND FUNCTIONAL STATUS - GENERAL
SUGGESTED CMS G CODE MODIFIER DAILY ACTIVITY: CH
SUGGESTED CMS G CODE MODIFIER MOBILITY: CH
MOBILITY SCORE: 24
DAILY ACTIVITIY SCORE: 24

## 2020-05-31 ASSESSMENT — ENCOUNTER SYMPTOMS
CONSTITUTIONAL NEGATIVE: 1
DIZZINESS: 0
GASTROINTESTINAL NEGATIVE: 1
CHILLS: 0
NERVOUS/ANXIOUS: 0
FEVER: 0
VOMITING: 0
CARDIOVASCULAR NEGATIVE: 1
RESPIRATORY NEGATIVE: 1
ABDOMINAL PAIN: 0
HEADACHES: 0
SHORTNESS OF BREATH: 0
NAUSEA: 0
MUSCULOSKELETAL NEGATIVE: 1
DIARRHEA: 0

## 2020-05-31 NOTE — PROGRESS NOTES
Rounded on patient and gave ordered medication. Patient denies any pain and states she feels comfortable at this time. Patient denies any nausea

## 2020-05-31 NOTE — PROGRESS NOTES
Gastroenterology Progress Note     Author: Jose Estrada M.D.   Date & Time Created: 5/31/2020 2:18 PM    Chief Complaint:  Acute GI bleed anemia    Initial history (Dr. Mcqueen):  This patient is   followed at our practice.  She has a history of gastritis and oozing gastropathy based on EGD in December by Dr. aKm of our practice.  Duodenum was normal.  The patient started on omeprazole twice daily at that time.  She has had heartburn prior to December, but none since starting the proton pump inhibitor.  She denies dysphagia, no upper abdominal pain.  She has had some right lower quadrant pain and was actually seen here in the emergency department yesterday.  Per review of notes, the pain has been present for about 3-4 months.  Eating may increase the pain, but this is inconsistent.  Topical heating pad seems to help the pain.  It is described as dull, but sometimes crampy.  She has had a previous cholecystectomy, but no appendectomy.  CT renal colic evaluation yesterday showed no kidney stones, atrophic bilateral kidneys, normal appendix without evidence of inflammatory changes, hazy ground glass opacity in the bilateral lung bases.  COVID testing was ordered and the patient was discharged home.  She presented to clinic today and saw Arthur Leon.  He noted that her hemoglobin was down at 6.9.  He recommended she come back to the emergency department here, her repeat hemoglobin is 7.7.  She did have an episode of hematemesis after dialysis this morning.  She frequently gets nausea and vomiting after her dialysis.  No nonsteroidal anti-inflammatory type medication use.     Patient was noted to have mild aminotransferase elevations today.  These were normal yesterday.  She denies any known history of liver disease.  No alcohol consumption.  No herbal remedies recently.  No known family history of liver disease.    Interval History:  05/30/2020 - EGD showed Karen-Keyes tear as likely source of GI  bleeding  05/31/2020 - No events overnight.  No nausea or vomiting.  No sign of ongoing bleeding.  Appetite good and tolerating clear liquids.      Review of Systems:  Review of Systems   Constitutional: Negative.    Respiratory: Negative.    Cardiovascular: Negative.    Gastrointestinal: Negative.    Musculoskeletal: Negative.    Skin: Negative.        Physical Exam:  Physical Exam  Vitals signs and nursing note reviewed.   Constitutional:       Appearance: Normal appearance.   HENT:      Head: Normocephalic and atraumatic.      Comments: So facies     Nose: Nose normal.      Mouth/Throat:      Mouth: Mucous membranes are dry.   Eyes:      Extraocular Movements: Extraocular movements intact.   Neck:      Musculoskeletal: Neck supple.   Cardiovascular:      Rate and Rhythm: Normal rate and regular rhythm.      Pulses: Normal pulses.      Heart sounds: Normal heart sounds.   Pulmonary:      Effort: Pulmonary effort is normal.      Breath sounds: Normal breath sounds.   Abdominal:      General: Abdomen is flat.      Palpations: Abdomen is soft.   Skin:     General: Skin is warm and dry.   Neurological:      General: No focal deficit present.      Mental Status: She is alert.   Psychiatric:         Mood and Affect: Mood normal.         Thought Content: Thought content normal.         Judgment: Judgment normal.         Labs:          Recent Labs     05/28/20 2300 05/29/20  1437 05/30/20  0633   SODIUM 132* 131* 129*   POTASSIUM 4.9 4.6 5.4   CHLORIDE 89* 89* 89*   CO2 29 30 29   BUN 39* 15 25*   CREATININE 7.18* 4.22* 5.98*   CALCIUM 9.1 9.4 9.1     Recent Labs     05/28/20  2300 05/29/20  1437 05/30/20  0633   ALTSGPT 13 57*  --    ASTSGOT 30 87*  --    ALKPHOSPHAT 65 95  --    TBILIRUBIN 0.4 0.5  --    LIPASE 34  --   --    GLUCOSE 87 90 90     Recent Labs     05/28/20  2300 05/29/20  1437  05/30/20  0633 05/30/20  1635 05/31/20  0144 05/31/20  0823   RBC 2.62* 2.95*  --  3.11*  --   --   --    HEMOGLOBIN 6.9* 7.7*    < > 8.5* 7.7* 7.9* 8.6*   HEMATOCRIT 23.6* 26.4*  --  27.7*  --   --   --    PLATELETCT 92* 84*  --  63*  --   --   --    PROTHROMBTM  --  13.8  --   --   --   --   --    APTT  --  29.2  --   --   --   --   --    INR  --  1.05  --   --   --   --   --     < > = values in this interval not displayed.     Recent Labs     20  2300 20  1437 20  0633   WBC 5.3 4.1* 2.8*   NEUTSPOLYS 74.40* 78.80*  --    LYMPHOCYTES 15.90* 11.70*  --    MONOCYTES 7.40 7.10  --    EOSINOPHILS 1.10 0.70  --    BASOPHILS 0.60 0.70  --    ASTSGOT 30 87*  --    ALTSGPT 13 57*  --    ALKPHOSPHAT 65 95  --    TBILIRUBIN 0.4 0.5  --      Hemodynamics:  Temp (24hrs), Av.8 °C (98.2 °F), Min:36.5 °C (97.7 °F), Max:37 °C (98.6 °F)  Temperature: 37 °C (98.6 °F)  Pulse  Av.9  Min: 63  Max: 100   Blood Pressure: 136/92     Respiratory:    Respiration: 18, Pulse Oximetry: 100 %           Fluids:  No intake or output data in the 24 hours ending 20 1418     GI/Nutrition:  Orders Placed This Encounter   Procedures   • Diet Order Full Liquid, Renal     Standing Status:   Standing     Number of Occurrences:   1     Order Specific Question:   Diet:     Answer:   Full Liquid [11]     Order Specific Question:   Diet:     Answer:   Renal [8]     Medical Decision Making, by Problem:  Active Hospital Problems    Diagnosis   • *Upper GI bleed [K92.2]   • ESRD (end stage renal disease) on dialysis (HCC) [N18.6, Z99.2]   • Normocytic anemia [D64.9]   • Lupus (HCC) [M32.9]   • Hypertension [I10]       Impression / Plan:  IMPRESSION:  1.  Hematemesis -- Karen-Keyes tear  2.  Nausea with vomiting -- likely secondary to her hemodialysis.  Consider possibility of gastroparesis or other metabolic abnormality contributing.  Consider medication side effects.  3.  Abdominal pain, right lower quadrant -- unclear etiology, monitor for the time being.  4.  Gastritis.  5.  Anemia with component of acute gastrointestinal blood loss and chronic  anemia.  6.  Thrombocytopenia.  8.  End-stage renal disease.  9.  Hypertension.  10.  Gastroesophageal reflux disease.     1.  Proton pump inhibitor therapy twice daily.  2.  Full liquid diet today, soft tomorrow  3.  Follow CBC  4.  Antiemetics  5. We will arrange for followup in our clinic.     **  GI WILL SIGN OFF / STAND BY - PLEASE CALL IF ANY CONCERNS  **    Quality-Core Measures   Reviewed items::  Medications reviewed and Labs reviewed

## 2020-05-31 NOTE — PROGRESS NOTES
Report received from Deanne MARTINEZ. POC discussed. Pt resting comfortably in bed. Safety precautions in place.

## 2020-05-31 NOTE — PROGRESS NOTES
Patient c/o a headache and feeling a little nausea from lunch gave tylenol and Zofran. Will continue to monitor.

## 2020-05-31 NOTE — PROGRESS NOTES
Telemetry Shift Summary    Rhythm SR  HR Range 70s-80s  Ectopy none  Measurements 0.20/0.08/0.38        Normal Values  Rhythm SR  HR Range    Measurements 0.12-0.20 / 0.06-0.10  / 0.30-0.52

## 2020-05-31 NOTE — PROGRESS NOTES
Dr. Styles notified that pt is requesting IV dilaudid for abd pain at 8/10. One time order received.

## 2020-05-31 NOTE — PROGRESS NOTES
Report given to Ingacia MARTINEZ. POC discussed. Pt resting comfortably in bed. Safety precautions in place.

## 2020-05-31 NOTE — PROGRESS NOTES
Hospital Medicine Daily Progress Note    Date of Service  5/31/2020    Chief Complaint  Hematemesis    Hospital Course    *Chronically ill 24-year-old female with history of lupus on immunosuppression with end-stage renal disease on hemodialysis presents with hematemesis.  Required transfusion overnight underwent EGD showing Karen-Keyes tear, esophagitis, gastritis, duodenitis*      Interval Problem Update  Patient had nausea and severe abdominal pain last night relieved by Dilaudid  Apparently she does not tolerate other narcotics as they make her nausea worse.  She has no pain this morning.  Hemoglobin has remained stable and she is tolerating full liquids.    Seen with RN    Consultants/Specialty  [facility]  Nephrology    Code Status  Full    Disposition  Home    Review of Systems  Review of Systems   Constitutional: Negative for chills and fever.   HENT: Negative for congestion.    Respiratory: Negative for shortness of breath.    Cardiovascular: Negative for chest pain.   Gastrointestinal: Negative for abdominal pain, diarrhea, nausea and vomiting.   Skin: Negative for itching and rash.   Neurological: Negative for dizziness and headaches.   Psychiatric/Behavioral: The patient is not nervous/anxious.         Physical Exam  Temp:  [36.5 °C (97.7 °F)-37 °C (98.6 °F)] 37 °C (98.6 °F)  Pulse:  [72-84] 72  Resp:  [16-18] 18  BP: (118-154)/(76-99) 136/92  SpO2:  [95 %-100 %] 100 %    Physical Exam  Vitals signs and nursing note reviewed.   Constitutional:       General: She is not in acute distress.     Appearance: She is normal weight. She is ill-appearing (chronically). She is not toxic-appearing or diaphoretic.   HENT:      Head: Normocephalic and atraumatic.      Right Ear: External ear normal.      Left Ear: External ear normal.      Nose: Nose normal.      Mouth/Throat:      Mouth: Mucous membranes are moist.      Pharynx: Oropharynx is clear.   Eyes:      Extraocular Movements: Extraocular movements  intact.      Pupils: Pupils are equal, round, and reactive to light.      Comments: Pale conjunctivae   Cardiovascular:      Rate and Rhythm: Normal rate and regular rhythm.   Pulmonary:      Effort: Pulmonary effort is normal.      Breath sounds: Normal breath sounds.   Abdominal:      General: Abdomen is flat. Bowel sounds are normal.      Palpations: Abdomen is soft.   Skin:     Coloration: Skin is not pale (complexion masks pallor).      Comments: Numerous scars   Neurological:      General: No focal deficit present.      Mental Status: She is alert and oriented to person, place, and time. Mental status is at baseline.      Cranial Nerves: No cranial nerve deficit.      Motor: No weakness.   Psychiatric:         Mood and Affect: Mood normal.         Fluids  No intake or output data in the 24 hours ending 05/31/20 1334    Laboratory  Recent Labs     05/28/20 2300 05/29/20 1437 05/30/20  0633 05/30/20  1635 05/31/20  0144 05/31/20  0823   WBC 5.3 4.1*  --  2.8*  --   --   --    RBC 2.62* 2.95*  --  3.11*  --   --   --    HEMOGLOBIN 6.9* 7.7*   < > 8.5* 7.7* 7.9* 8.6*   HEMATOCRIT 23.6* 26.4*  --  27.7*  --   --   --    MCV 90.1 89.5  --  89.1  --   --   --    MCH 26.3* 26.1*  --  27.3  --   --   --    MCHC 29.2* 29.2*  --  30.7*  --   --   --    RDW 60.7* 60.8*  --  57.7*  --   --   --    PLATELETCT 92* 84*  --  63*  --   --   --    MPV 10.8 11.4  --  11.6  --   --   --     < > = values in this interval not displayed.     Recent Labs     05/28/20 2300 05/29/20 1437 05/30/20  0633   SODIUM 132* 131* 129*   POTASSIUM 4.9 4.6 5.4   CHLORIDE 89* 89* 89*   CO2 29 30 29   GLUCOSE 87 90 90   BUN 39* 15 25*   CREATININE 7.18* 4.22* 5.98*   CALCIUM 9.1 9.4 9.1     Recent Labs     05/29/20  1437   APTT 29.2   INR 1.05               Imaging  No orders to display        Assessment/Plan  * Upper GI bleed  Assessment & Plan  1) Karen Keyes Syndrome/Tear- source of the patient's hematemesis  2) Esophagitis  3)  Gastritis  4) Duodenitis    IV PPI, full liquids.  Short-term Carafate okay per nephrology  Hemoglobin stable overnight she is tolerating full liquids  Await GI reassessment    ESRD (end stage renal disease) on dialysis (HCC)- (present on admission)  Assessment & Plan  Stable continue follow-up as outpatient  Continue HD    Normocytic anemia- (present on admission)  Assessment & Plan  Hemoglobin initially 7.7 fell to 6.7 patient received transfusion  EGD:  1) Karen Keyes Syndrome/Tear- source of the patient's hematemesis  2) Esophagitis  3) Gastritis  4) Duodenitis    Lupus (HCC)- (present on admission)  Assessment & Plan  Follow-up as outpatient  Continue steroid and CellCept, Plaquenil    Hypertension- (present on admission)  Assessment & Plan  Blood pressure controlled continue atenolol and amlodipine       VTE prophylaxis: SCDs

## 2020-05-31 NOTE — PROGRESS NOTES
1745 Medicated patient per MAR, pt complained of 8/10 abdominal pain as well as headache and nausea, medicated per MAR. Patient and mother on the phone updated of recent hemoglobin level as well as plan of care. Safety precautions in place.    1840 Report given to Araceli MARTINEZ. Plan of care discussed. Patient resting comfortably in bed. Safety precautions in place.

## 2020-05-31 NOTE — PROGRESS NOTES
Discharged order written, IV removed, tele removed and returned to the monitor tech. Patient discharged to home with mom. AVS printed and revieved copied signed and placed on chart. Discharged instructions provided to patient. Patient verbalizes understanding. Patent states all questions have been answered. Patient states that all personal belongings are in possesion.  Prescriptions sent to pharmacy, [facility] . . Patient off unit by wheelchair, escorted by Calli PANDYA

## 2020-05-31 NOTE — CARE PLAN
Problem: Communication  Goal: The ability to communicate needs accurately and effectively will improve  5/31/2020 1441 by Ignacia Rose R.N.  Outcome: MET  5/31/2020 1049 by Ignacia Rose R.N.  Outcome: PROGRESSING AS EXPECTED     Problem: Safety  Goal: Will remain free from injury  Outcome: MET  Goal: Will remain free from falls  Outcome: MET     Problem: Infection  Goal: Will remain free from infection  Outcome: MET     Problem: Venous Thromboembolism (VTW)/Deep Vein Thrombosis (DVT) Prevention:  Goal: Patient will participate in Venous Thrombosis (VTE)/Deep Vein Thrombosis (DVT)Prevention Measures  Outcome: MET     Problem: Bowel/Gastric:  Goal: Normal bowel function is maintained or improved  Outcome: MET  Goal: Will not experience complications related to bowel motility  Outcome: MET     Problem: Knowledge Deficit  Goal: Knowledge of disease process/condition, treatment plan, diagnostic tests, and medications will improve  Outcome: MET  Goal: Knowledge of the prescribed therapeutic regimen will improve  Outcome: MET     Problem: Discharge Barriers/Planning  Goal: Patient's continuum of care needs will be met  Outcome: MET     Problem: Fluid Volume:  Goal: Will maintain balanced intake and output  Outcome: MET     Problem: Pain Management  Goal: Pain level will decrease to patient's comfort goal  5/31/2020 1441 by Ignacia Rose R.N.  Outcome: MET  5/31/2020 1049 by Ignacia Rose R.N.  Outcome: PROGRESSING AS EXPECTED

## 2020-05-31 NOTE — PROGRESS NOTES
"Assessment complete. Pt resting comfortably in bed, reporting no nausea at this time and abd pain level is \"good\" at 2/10. Pt consumed less than 25% of her dinner, tray removed from room as she did not plan on eating any more. Safety precautions in place. Call light in reach.  "

## 2020-05-31 NOTE — DISCHARGE INSTRUCTIONS
Discharge Instructions per Karina Boyce M.D.    Follow-up with primary care and as usual with hemodialysis    DIET: As recommended by GI    ACTIVITY: Regular    DIAGNOSIS: Upper GI bleed due to Karen-Keyes tear    Return to ER if recurrent issues      Discharge Instructions    Discharged to home by car with relative. Discharged via wheelchair, hospital escort: Yes.  Special equipment needed: Not Applicable    Be sure to schedule a follow-up appointment with your primary care doctor or any specialists as instructed.     Discharge Plan:        I understand that a diet low in cholesterol, fat, and sodium is recommended for good health. Unless I have been given specific instructions below for another diet, I accept this instruction as my diet prescription.   Other diet: Full liquid 5/31, advanced to soft GI for 2 days     Special Instructions: None    · Is patient discharged on Warfarin / Coumadin?   No     Depression / Suicide Risk    As you are discharged from this [facility] facility, it is important to learn how to keep safe from harming yourself.    Recognize the warning signs:  · Abrupt changes in personality, positive or negative- including increase in energy   · Giving away possessions  · Change in eating patterns- significant weight changes-  positive or negative  · Change in sleeping patterns- unable to sleep or sleeping all the time   · Unwillingness or inability to communicate  · Depression  · Unusual sadness, discouragement and loneliness  · Talk of wanting to die  · Neglect of personal appearance   · Rebelliousness- reckless behavior  · Withdrawal from people/activities they love  · Confusion- inability to concentrate     If you or a loved one observes any of these behaviors or has concerns about self-harm, here's what you can do:  · Talk about it- your feelings and reasons for harming yourself  · Remove any means that you might use to hurt yourself (examples: pills, rope, extension cords,  firearm)  · Get professional help from the community (Mental Health, Substance Abuse, psychological counseling)  · Do not be alone:Call your Safe Contact- someone whom you trust who will be there for you.  · Call your local CRISIS HOTLINE 101-2279 or 318-938-0797  · Call your local Children's Mobile Crisis Response Team [place] (036) 543-5125 or www.[employer]  · Call the toll free National Suicide Prevention Hotlines   · National Suicide Prevention Lifeline 849-620-VRPW (7917)  · [employer] [facility] [employer] 800-SUICIDE (796-9329)      Ondansetron tablets  What is this medicine?  ONDANSETRON (on DAN se estuardo) is used to treat nausea and vomiting caused by chemotherapy. It is also used to prevent or treat nausea and vomiting after surgery.  This medicine may be used for other purposes; ask your health care provider or pharmacist if you have questions.  COMMON BRAND NAME(S): Zofran  What should I tell my health care provider before I take this medicine?  They need to know if you have any of these conditions:  -heart disease  -history of irregular heartbeat  -liver disease  -low levels of magnesium or potassium in the blood  -an unusual or allergic reaction to ondansetron, granisetron, other medicines, foods, dyes, or preservatives  -pregnant or trying to get pregnant  -breast-feeding  How should I use this medicine?  Take this medicine by mouth with a glass of water. Follow the directions on your prescription label. Take your doses at regular intervals. Do not take your medicine more often than directed.  Talk to your pediatrician regarding the use of this medicine in children. Special care may be needed.  Overdosage: If you think you have taken too much of this medicine contact a poison control center or emergency room at once.  NOTE: This medicine is only for you. Do not share this medicine with others.  What if I miss a dose?  If you miss a dose, take it as soon as you can. If it is almost time for your  next dose, take only that dose. Do not take double or extra doses.  What may interact with this medicine?  Do not take this medicine with any of the following medications:  -apomorphine  -certain medicines for fungal infections like fluconazole, itraconazole, ketoconazole, posaconazole, voriconazole  -cisapride  -dofetilide  -dronedarone  -pimozide  -thioridazine  -ziprasidone  This medicine may also interact with the following medications:  -carbamazepine  -certain medicines for depression, anxiety, or psychotic disturbances  -fentanyl  -linezolid  -MAOIs like Carbex, Eldepryl, Marplan, Nardil, and Parnate  -methylene blue (injected into a vein)  -other medicines that prolong the QT interval (cause an abnormal heart rhythm)  -phenytoin  -rifampicin  -tramadol  This list may not describe all possible interactions. Give your health care provider a list of all the medicines, herbs, non-prescription drugs, or dietary supplements you use. Also tell them if you smoke, drink alcohol, or use illegal drugs. Some items may interact with your medicine.  What should I watch for while using this medicine?  Check with your doctor or health care professional right away if you have any sign of an allergic reaction.  What side effects may I notice from receiving this medicine?  Side effects that you should report to your doctor or health care professional as soon as possible:  -allergic reactions like skin rash, itching or hives, swelling of the face, lips or tongue  -breathing problems  -confusion  -dizziness  -fast or irregular heartbeat  -feeling faint or lightheaded, falls  -fever and chills  -loss of balance or coordination  -seizures  -sweating  -swelling of the hands or feet  -tightness in the chest  -tremors  -unusually weak or tired  Side effects that usually do not require medical attention (report to your doctor or health care professional if they continue or are bothersome):  -constipation or diarrhea  -headache  This  list may not describe all possible side effects. Call your doctor for medical advice about side effects. You may report side effects to FDA at 4-227-ICO-3705.  Where should I keep my medicine?  Keep out of the reach of children.  Store between 2 and 30 degrees C (36 and 86 degrees F). Throw away any unused medicine after the expiration date.  NOTE: This sheet is a summary. It may not cover all possible information. If you have questions about this medicine, talk to your doctor, pharmacist, or health care provider.  © 2018 Elsevier/Gold Standard (2014-09-24 16:27:45)        Pantoprazole tablets  What is this medicine?  PANTOPRAZOLE (pan TOE pra zole) prevents the production of acid in the stomach. It is used to treat gastroesophageal reflux disease (GERD), inflammation of the esophagus, and Zollinger-Estrada syndrome.  This medicine may be used for other purposes; ask your health care provider or pharmacist if you have questions.  COMMON BRAND NAME(S): Protonix  What should I tell my health care provider before I take this medicine?  They need to know if you have any of these conditions:  -liver disease  -low levels of magnesium in the blood  -lupus  -an unusual or allergic reaction to omeprazole, lansoprazole, pantoprazole, rabeprazole, other medicines, foods, dyes, or preservatives  -pregnant or trying to get pregnant  -breast-feeding  How should I use this medicine?  Take this medicine by mouth. Swallow the tablets whole with a drink of water. Follow the directions on the prescription label. Do not crush, break, or chew. Take your medicine at regular intervals. Do not take your medicine more often than directed.  Talk to your pediatrician regarding the use of this medicine in children. While this drug may be prescribed for children as young as 5 years for selected conditions, precautions do apply.  Overdosage: If you think you have taken too much of this medicine contact a poison control center or emergency room  at once.  NOTE: This medicine is only for you. Do not share this medicine with others.  What if I miss a dose?  If you miss a dose, take it as soon as you can. If it is almost time for your next dose, take only that dose. Do not take double or extra doses.  What may interact with this medicine?  Do not take this medicine with any of the following medications:  -atazanavir  -nelfinavir  This medicine may also interact with the following medications:  -ampicillin  -delavirdine  -erlotinib  -iron salts  -medicines for fungal infections like ketoconazole, itraconazole and voriconazole  -methotrexate  -mycophenolate mofetil  -warfarin  This list may not describe all possible interactions. Give your health care provider a list of all the medicines, herbs, non-prescription drugs, or dietary supplements you use. Also tell them if you smoke, drink alcohol, or use illegal drugs. Some items may interact with your medicine.  What should I watch for while using this medicine?  It can take several days before your stomach pain gets better. Check with your doctor or health care professional if your condition does not start to get better, or if it gets worse.  You may need blood work done while you are taking this medicine.  What side effects may I notice from receiving this medicine?  Side effects that you should report to your doctor or health care professional as soon as possible:  -allergic reactions like skin rash, itching or hives, swelling of the face, lips, or tongue  -bone, muscle or joint pain  -breathing problems  -chest pain or chest tightness  -dark yellow or brown urine  -dizziness  -fast, irregular heartbeat  -feeling faint or lightheaded  -fever or sore throat  -muscle spasm  -palpitations  -rash on cheeks or arms that gets worse in the sun  -redness, blistering, peeling or loosening of the skin, including inside the mouth  -seizures  -tremors  -unusual bleeding or bruising  -unusually weak or tired  -yellowing of  the eyes or skin  Side effects that usually do not require medical attention (report to your doctor or health care professional if they continue or are bothersome):  -constipation  -diarrhea  -dry mouth  -headache  -nausea  This list may not describe all possible side effects. Call your doctor for medical advice about side effects. You may report side effects to FDA at 9-095-YQY-9662.  Where should I keep my medicine?  Keep out of the reach of children.  Store at room temperature between 15 and 30 degrees C (59 and 86 degrees F). Protect from light and moisture. Throw away any unused medicine after the expiration date.  NOTE: This sheet is a summary. It may not cover all possible information. If you have questions about this medicine, talk to your doctor, pharmacist, or health care provider.  © 2018 Elsevier/Gold Standard (2017-01-19 12:20:19)        Low-Fiber Diet  Fiber is found in fruits, vegetables, and whole grains. A low-fiber diet restricts fibrous foods that are not digested in the small intestine. A diet containing about 10-15 grams of fiber per day is considered low fiber. Low-fiber diets may be used to:  · Promote healing and rest the bowel during intestinal flare-ups.  · Prevent blockage of a partially obstructed or narrowed gastrointestinal tract.  · Reduce fecal weight and volume.  · Slow the movement of feces.  You may be on a low-fiber diet as a transitional diet following surgery, after an injury (trauma), or because of a short (acute) or lifelong (chronic) illness. Your health care provider will determine the length of time you need to stay on this diet.  What do I need to know about a low-fiber diet?  Always check the fiber content on the packaging's Nutrition Facts label, especially on foods from the grains list. Ask your dietitian if you have questions about specific foods that are related to your condition, especially if the food is not listed below. In general, a low-fiber food will have less  than 2 g of fiber.  What foods can I eat?  Grains   All breads and crackers made with white flour. Sweet rolls, doughnuts, waffles, pancakes, [place] toast, bagels. Pretzels, [place] toast, zwieback. Well-cooked cereals, such as cornmeal, farina, or cream cereals. Dry cereals that do not contain whole grains, fruit, or nuts, such as refined corn, wheat, rice, and oat cereals. Potatoes prepared any way without skins, plain pastas and noodles, refined white rice. Use white flour for baking and making sauces. Use allowed list of grains for casseroles, dumplings, and puddings.  Vegetables   Strained tomato and vegetable juices. Fresh lettuce, cucumber, spinach. Well-cooked (no skin or pulp) or canned vegetables, such as asparagus, bean sprouts, beets, carrots, green beans, mushrooms, potatoes, pumpkin, spinach, yellow squash, tomato sauce/puree, turnips, yams, and zucchini. Keep servings limited to ½ cup.  Fruits   All fruit juices except prune juice. Cooked or canned fruits without skin and seeds, such as applesauce, apricots, cherries, fruit cocktail, grapefruit, grapes, mandarin oranges, melons, peaches, pears, pineapple, and plums. Fresh fruits without skin, such as apricots, avocados, bananas, melons, pineapple, nectarines, and peaches. Keep servings limited to ½ cup or 1 piece.  Meat and Other Protein Sources   Ground or well-cooked tender beef, ham, veal, lamb, pork, or poultry. Eggs, plain cheese. Fish, oysters, shrimp, lobster, and other seafood. Liver, organ meats. Smooth nut butters.  Dairy   All milk products and alternative dairy substitutes, such as soy, rice, almond, and coconut, not containing added whole nuts, seeds, or added fruit.  Beverages   Decaf coffee, fruit, and vegetable juices or smoothies (small amounts, with no pulp or skins, and with fruits from allowed list), sports drinks, herbal tea.  Condiments   Ketchup, mustard, vinegar, cream sauce, cheese sauce, cocoa powder. Spices in moderation, such  as allspice, basil, bay leaves, celery powder or leaves, cinnamon, cumin powder, ramos powder, marj, mace, marjoram, onion or garlic powder, oregano, paprika, parsley flakes, ground pepper, rosemary, renita, savory, tarragon, thyme, and turmeric.  Sweets and Desserts   Plain cakes and cookies, pie made with allowed fruit, pudding, custard, cream pie. Gelatin, fruit, ice, sherbet, frozen ice pops. Ice cream, ice milk without nuts. Plain hard candy, honey, jelly, molasses, syrup, sugar, chocolate syrup, gumdrops, marshmallows. Limit overall sugar intake.  Fats and Oil   Margarine, butter, cream, mayonnaise, salad oils, plain salad dressings made from allowed foods. Choose healthy fats such as olive oil, canola oil, and omega-3 fatty acids (such as found in salmon or tuna) when possible.  Other   Bouillon, broth, or cream soups made from allowed foods. Any strained soup. Casseroles or mixed dishes made with allowed foods.  The items listed above may not be a complete list of recommended foods or beverages. Contact your dietitian for more options.   What foods are not recommended?  Grains   All whole wheat and whole grain breads and crackers. Multigrains, rye, bran seeds, nuts, or coconut. Cereals containing whole grains, multigrains, bran, coconut, nuts, raisins. Cooked or dry oatmeal, steel-cut oats. Coarse wheat cereals, granola. Cereals advertised as high fiber. Potato skins. Whole grain pasta, wild or brown rice. Popcorn. Coconut flour. Bran, buckwheat, corn bread, multigrains, rye, wheat germ.  Vegetables   Fresh, cooked or canned vegetables, such as artichokes, asparagus, beet greens, broccoli, [place] sprouts, cabbage, celery, cauliflower, corn, eggplant, kale, legumes or beans, okra, peas, and tomatoes. Avoid large servings of any vegetables, especially raw vegetables.  Fruits   Fresh fruits, such as apples with or without skin, berries, cherries, figs, grapes, grapefruit, guavas, kiwis, mangoes, oranges,  papayas, pears, persimmons, pineapple, and pomegranate. Prune juice and juices with pulp, stewed or dried prunes. Dried fruits, dates, raisins. Fruit seeds or skins. Avoid large servings of all fresh fruits.  Meats and Other Protein Sources   Tough, fibrous meats with gristle. [place] nut butter. Cheese made with seeds, nuts, or other foods not recommended. Nuts, seeds, legumes (beans, including baked beans), dried peas, beans, lentils.  Dairy   Yogurt or cheese that contains nuts, seeds, or added fruit.  Beverages   Fruit juices with high pulp, prune juice. Caffeinated coffee and teas.  Condiments   Coconut, maple syrup, pickles, olives.  Sweets and Desserts   Desserts, cookies, or candies that contain nuts or coconut, chunky peanut butter, dried fruits. Jams, preserves with seeds, marmalade. Large amounts of sugar and sweets. Any other dessert made with fruits from the not recommended list.  Other   Soups made from vegetables that are not recommended or that contain other foods not recommended.  The items listed above may not be a complete list of foods and beverages to avoid. Contact your dietitian for more information.   This information is not intended to replace advice given to you by your health care provider. Make sure you discuss any questions you have with your health care provider.  Document Released: 06/09/2003 Document Revised: 05/25/2017 Document Reviewed: 11/10/2014  [employer] Interactive Patient Education © 2017 [employer]        Full Liquid Diet  A full liquid diet may be used:  · To help you transition from a clear liquid diet to a soft diet.  · When your body is healing and can only tolerate foods that are easy to digest.  · Before or after certain a procedure, test, or surgery (such as stomach or intestinal surgeries).  · If you have trouble swallowing or chewing.  A full liquid diet includes fluids and foods that are liquid or will become liquid at room temperature. The full liquid diet gives  you the proteins, fluids, salts, and minerals that you need for energy.  If you continue this diet for more than 72 hours, talk to your health care provider about how many calories you need to consume. If you continue the diet for more than 5 days, talk to your health care provider about taking a multivitamin or a nutritional supplement.  What do I need to know about a full liquid diet?  · You may have any liquid.  · You may have any food that becomes a liquid at room temperature. The food is considered a liquid if it can be poured off a spoon at room temperature.  · Drink one serving of citrus or vitamin C-enriched fruit juice daily.  What foods can I eat?  Grains   Any grain food that can be pureed in soup (such as crackers, pasta, and rice). Hot cereal (such as farina or oatmeal) that has been blended. Talk to your health care provider or dietitian about these foods.  Vegetables   Pulp-free tomato or vegetable juice. Vegetables pureed in soup.  Fruits   Fruit juice, including nectars and juices with pulp.  Meats and Other Protein Sources   Eggs in custard, eggnog mix, and eggs used in ice cream or pudding. Strained meats, like in baby food, may be allowed. Consult your health care provider.  Dairy   Milk and milk-based beverages, including milk shakes and instant breakfast mixes. Smooth yogurt. Pureed cottage cheese. Avoid these foods if they are not well tolerated.  Beverages   All beverages, including liquid nutritional supplements. Ask your health care provider if you can have carbonated beverages. They may not be well tolerated.  Condiments   Iodized salt, pepper, spices, and flavorings. Cocoa powder. Vinegar, ketchup, yellow mustard, smooth sauces (such as hollandaise, cheese sauce, or white sauce), and soy sauce.  Sweets and Desserts   Custard, smooth pudding. Flavored gelatin. Tapioca, junket. Plain ice cream, sherbet, fruit ices. Frozen ice pops, frozen fudge pops, pudding pops, and other frozen bars with  cream. Syrups, including chocolate syrup. Sugar, honey, jelly.  Fats and Oils   Margarine, butter, cream, sour cream, and oils.  Other   Broth and cream soups. Strained, broth-based soups.  The items listed above may not be a complete list of recommended foods or beverages. Contact your dietitian for more options.   What foods can I not eat?  Grains   All breads. Grains are not allowed unless they are pureed into soup.  Vegetables   Vegetables are not allowed unless they are juiced, or cooked and pureed into soup.  Fruits   Fruits are not allowed unless they are juiced.  Meats and Other Protein Sources   Any meat or fish. Cooked or raw eggs. Nut butters.  Dairy   Cheese.  Condiments   Stone ground mustards.  Fats and Oils   Fats that are coarse or chunky.  Sweets and Desserts   Ice cream or other frozen desserts that have any solids in them or on top, such as nuts, chocolate chips, and pieces of cookies. Cakes. Cookies. Candy.  Others   Soups with chunks or pieces in them.  The items listed above may not be a complete list of foods and beverages to avoid. Contact your dietitian for more information.   This information is not intended to replace advice given to you by your health care provider. Make sure you discuss any questions you have with your health care provider.  Document Released: 12/18/2006 Document Revised: 05/25/2017 Document Reviewed: 10/23/2014  [employer] Interactive Patient Education © 2017 [employer]

## 2020-05-31 NOTE — PROGRESS NOTES
Received report from MICHELLE Charles. Patient is sleeping, Pt resting comfortably in bed, Call light within reach and safety precautions in place.         08:30 Assess patient, patient easily woken, plan of care discussed with patient, declines any needs at this time and denies pain. Patient states she has no N/V. Safety precautions in use.

## 2020-05-31 NOTE — CARE PLAN
Problem: Infection  Goal: Will remain free from infection  Outcome: PROGRESSING AS EXPECTED  Note: Pt educated on handwashing and hygiene. Standard precautions implemented. Assessed for s/s of infections. VS and labs monitored. Pt verbalized understanding of infection prevention care plan.      Problem: Knowledge Deficit  Goal: Knowledge of disease process/condition, treatment plan, diagnostic tests, and medications will improve  Outcome: PROGRESSING AS EXPECTED  Note: Pt knowledge level assessed. Pt educated on disease process/condition, POC, diagnostic tests, and medications. Pt verbalized understanding of care plan.

## 2020-06-01 LAB
SARS-COV-2 RNA RESP QL NAA+PROBE: NOT DETECTED
SPECIMEN SOURCE: NORMAL

## 2020-06-01 NOTE — DISCHARGE SUMMARY
"Discharge Summary    CHIEF COMPLAINT ON ADMISSION  Chief Complaint   Patient presents with   • Vomiting     sent by GI \"vomitting blood\"       Reason for Admission  Threw up blood     Admission Date  5/29/2020    CODE STATUS  Prior    HPI & HOSPITAL COURSE  *Chronically ill 24-year-old female with history of lupus on immunosuppression with end-stage renal disease on hemodialysis presents with hematemesis.  Required transfusion overnight underwent EGD showing Karen-Keyes tear, esophagitis, gastritis, duodenitis*     Therefore, she is discharged in good and stable condition to home with close outpatient follow-up.    The patient met 2-midnight criteria for an inpatient stay at the time of discharge.    Discharge Date  5/31/2020    FOLLOW UP ITEMS POST DISCHARGE  PCP  Nephrology/ HD    DISCHARGE DIAGNOSES  Principal Problem:    Upper GI bleed POA: Unknown  Active Problems:    ESRD (end stage renal disease) on dialysis (HCC) POA: Yes    Hypertension POA: Yes      Overview: \"Controlled with medication\"    Lupus (HCC) POA: Yes    Normocytic anemia POA: Yes  Resolved Problems:    * No resolved hospital problems. *      FOLLOW UP  No future appointments.  Ella Matthew M.D.  [facility] [address] [place] [id number]  [phone]    Call  As needed, If symptoms worsen      MEDICATIONS ON DISCHARGE     Medication List      START taking these medications      Instructions   ondansetron 4 MG Tbdp  Commonly known as:  ZOFRAN ODT   Take 1 Tab by mouth every four hours as needed for Nausea (give PO if no IV route available).  Dose:  4 mg     pantoprazole 40 MG Tbec  Commonly known as:  PROTONIX   Take 1 Tab by mouth 2 times a day. After completed resume previous dose of Prilosec OTC  Dose:  40 mg        CONTINUE taking these medications      Instructions   amLODIPine 10 MG Tabs  Commonly known as:  NORVASC   Take 10 mg by mouth every evening.  Dose:  10 mg     atenolol 25 MG Tabs  Commonly known as:  TENORMIN   Take 1 tablet by " mouth once daily     hydroxychloroquine 200 MG Tabs  Commonly known as:  Plaquenil   Take 200 mg by mouth every evening.  Dose:  200 mg     Myfortic 360 MG Tbec tablet  Generic drug:  mycophenolate sodium   Take 360 mg by mouth every evening.  Dose:  360 mg     predniSONE 5 MG Tabs  Commonly known as:  DELTASONE   Take 5 mg by mouth every evening.  Dose:  5 mg        STOP taking these medications    omeprazole 20 MG tablet  Commonly known as:  PRILOSEC OTC            Allergies  Allergies   Allergen Reactions   • Clindamycin Rash     Hive   • Keflex Rash     Hives   • Metoprolol Nausea       DIET  Renal  Soft    ACTIVITY  regular    CONSULTATIONS  GI    PROCEDURES  (suggestion)    Draft: Not electronically signed.            []Hide copied text    []Sonya for details  DATE OF SERVICE:  05/30/2020     PROCEDURE PERFORMED:  Esophagogastroduodenoscopy with removal of esophageal   foreign body.     INDICATIONS:  Hematemesis and nausea and vomiting, and anemia.     CONSENT:  Full RBA discussion held prior to the procedure and signed witnessed   consent form placed on the chart.     INSTRUMENT UTILIZED:  Olympus flexible forward-viewing gastroscope.     ANESTHESIA AND SEDATION:  Provided by anesthesiologist, Dr. Gansert.     TOLERANCE:  Excellent.     PATHOLOGY SPECIMENS:  None.     PROCEDURAL DETAIL:  After adequate sedation, the Olympus flexible   forward-viewing gastroscope was advanced per the oral route into the   esophagus.  There was immediately noted to be a large burden of clot within   the distal esophagus.  This was removed via gentle pressure utilizing the   gastroscope.  The clot was pushed into the dependent portion of the stomach.    There was additional clot noted in the dependent portion of the stomach and   some liquid.  This was suctioned and removed.  The instrument was pulled back   into the esophagus.  There was noted to be an approximately 5 mm mucosal rent   at the gastroesophageal junction, located  about 37 cm from the incisors   consistent with a Karen-Keyes tear.  This was not bleeding at this time.    There was also noted to be Rooks classification grade C esophagitis in   the distal esophagus.  The instrument was passed into the stomach, air was   insufflated and the gastric mucosa inspected including a retroflexed view of   the gastric cardia.  There were no gastric nor esophageal varices noted.    Retroflexion was taken down.  The gastric mucosa was diffusely erythematous   and edematous with a few scattered red spots and erosions, particularly within   the antrum.  Instrument was passed through the patent pyloric channel into   the duodenum.  The first, second and third portions of the duodenum were   unremarkable.  There was clear-to-yellow bile flowing per the ampullary   orifice intermittently.     COMPLICATIONS:  No immediate complications.     IMPRESSIONS AND FINDINGS:  1.  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  2.  Esophageal foreign body -- clot.  Status post removal from the esophagus.  3.  Gastritis, suspected.  4.  Esophagitis.  5.  Duodenitis, suspected.     PLAN AND RECOMMENDATIONS:  1.  Observe for any adverse events from this procedure.  2.  Proton pump inhibitor therapy twice daily.  3.  Antiemetics.  4.  Sucralfate solution 3 times daily for 2 weeks.  5.  Monitor hemoglobin and hematocrit.  6.  Anticipate discharge to home tomorrow if stable without evidence of   recurrent overt bleeding.  7.  We will arrange for followup in our clinic.        ____________________________________     KETURAH STREET MD     [facility] / N[facility]            LABORATORY  Lab Results   Component Value Date    SODIUM 129 (L) 05/30/2020    POTASSIUM 5.4 05/30/2020    CHLORIDE 89 (L) 05/30/2020    CO2 29 05/30/2020    GLUCOSE 90 05/30/2020    BUN 25 (H) 05/30/2020    CREATININE 5.98 (HH) 05/30/2020        Lab Results   Component  Value Date    WBC 2.8 (L) 05/30/2020    HEMOGLOBIN 8.6 (L) 05/31/2020    HEMATOCRIT 27.7 (L) 05/30/2020    PLATELETCT 63 (L) 05/30/2020        Total time of the discharge process exceeds 32 minutes.

## 2020-06-01 NOTE — PROGRESS NOTES
Telemetry Shift Summary    Rhythm:  SR  HR Range: 70s-80s  Ectopy: none  Measurements: .16/.08/.40          Normal Values  Rhythm SR  HR Range   Measurements 0.12 / 0.20 / 0.06-0.10 / 0.30-0.52

## 2020-06-18 ENCOUNTER — HOSPITAL ENCOUNTER (INPATIENT)
Facility: MEDICAL CENTER | Age: 25
LOS: 1 days | DRG: 811 | End: 2020-06-19
Attending: EMERGENCY MEDICINE | Admitting: HOSPITALIST
Payer: COMMERCIAL

## 2020-06-18 DIAGNOSIS — D64.9 ANEMIA, UNSPECIFIED TYPE: ICD-10-CM

## 2020-06-18 LAB
ABO GROUP BLD: ABNORMAL
ALBUMIN SERPL BCP-MCNC: 3.4 G/DL (ref 3.2–4.9)
ALBUMIN/GLOB SERPL: 0.7 G/DL
ALP SERPL-CCNC: 59 U/L (ref 30–99)
ALT SERPL-CCNC: 11 U/L (ref 2–50)
ANION GAP SERPL CALC-SCNC: 9 MMOL/L (ref 7–16)
AST SERPL-CCNC: 30 U/L (ref 12–45)
BARCODED ABORH UBTYP: 5100
BARCODED PRD CODE UBPRD: ABNORMAL
BARCODED UNIT NUM UBUNT: ABNORMAL
BASOPHILS # BLD AUTO: 0.6 % (ref 0–1.8)
BASOPHILS # BLD: 0.03 K/UL (ref 0–0.12)
BILIRUB SERPL-MCNC: 0.3 MG/DL (ref 0.1–1.5)
BLD GP AB SCN SERPL QL: ABNORMAL
BUN SERPL-MCNC: 38 MG/DL (ref 8–22)
CALCIUM SERPL-MCNC: 8.9 MG/DL (ref 8.4–10.2)
CHLORIDE SERPL-SCNC: 93 MMOL/L (ref 96–112)
CO2 SERPL-SCNC: 28 MMOL/L (ref 20–33)
COMPONENT R 8504R: ABNORMAL
CREAT SERPL-MCNC: 7.17 MG/DL (ref 0.5–1.4)
EOSINOPHIL # BLD AUTO: 0.04 K/UL (ref 0–0.51)
EOSINOPHIL NFR BLD: 0.8 % (ref 0–6.9)
ERYTHROCYTE [DISTWIDTH] IN BLOOD BY AUTOMATED COUNT: 74.6 FL (ref 35.9–50)
GLOBULIN SER CALC-MCNC: 4.8 G/DL (ref 1.9–3.5)
GLUCOSE SERPL-MCNC: 88 MG/DL (ref 65–99)
HCT VFR BLD AUTO: 17.5 % (ref 37–47)
HGB BLD-MCNC: 4.9 G/DL (ref 12–16)
IMM GRANULOCYTES # BLD AUTO: 0.04 K/UL (ref 0–0.11)
IMM GRANULOCYTES NFR BLD AUTO: 0.8 % (ref 0–0.9)
LYMPHOCYTES # BLD AUTO: 0.96 K/UL (ref 1–4.8)
LYMPHOCYTES NFR BLD: 18.2 % (ref 22–41)
MCH RBC QN AUTO: 28 PG (ref 27–33)
MCHC RBC AUTO-ENTMCNC: 28 G/DL (ref 33.6–35)
MCV RBC AUTO: 100 FL (ref 81.4–97.8)
MONOCYTES # BLD AUTO: 0.44 K/UL (ref 0–0.85)
MONOCYTES NFR BLD AUTO: 8.3 % (ref 0–13.4)
NEUTROPHILS # BLD AUTO: 3.77 K/UL (ref 2–7.15)
NEUTROPHILS NFR BLD: 71.3 % (ref 44–72)
NRBC # BLD AUTO: 0 K/UL
NRBC BLD-RTO: 0 /100 WBC
PLATELET # BLD AUTO: 109 K/UL (ref 164–446)
PMV BLD AUTO: 10.5 FL (ref 9–12.9)
POTASSIUM SERPL-SCNC: 4.7 MMOL/L (ref 3.6–5.5)
PRODUCT TYPE UPROD: ABNORMAL
PROT SERPL-MCNC: 8.2 G/DL (ref 6–8.2)
RBC # BLD AUTO: 1.75 M/UL (ref 4.2–5.4)
RH BLD: ABNORMAL
SODIUM SERPL-SCNC: 130 MMOL/L (ref 135–145)
UNIT STATUS USTAT: ABNORMAL
WBC # BLD AUTO: 5.3 K/UL (ref 4.8–10.8)

## 2020-06-18 PROCEDURE — 86901 BLOOD TYPING SEROLOGIC RH(D): CPT

## 2020-06-18 PROCEDURE — 96374 THER/PROPH/DIAG INJ IV PUSH: CPT

## 2020-06-18 PROCEDURE — 700111 HCHG RX REV CODE 636 W/ 250 OVERRIDE (IP): Performed by: EMERGENCY MEDICINE

## 2020-06-18 PROCEDURE — 96375 TX/PRO/DX INJ NEW DRUG ADDON: CPT

## 2020-06-18 PROCEDURE — 86850 RBC ANTIBODY SCREEN: CPT

## 2020-06-18 PROCEDURE — 80053 COMPREHEN METABOLIC PANEL: CPT

## 2020-06-18 PROCEDURE — 770006 HCHG ROOM/CARE - MED/SURG/GYN SEMI*

## 2020-06-18 PROCEDURE — 99285 EMERGENCY DEPT VISIT HI MDM: CPT

## 2020-06-18 PROCEDURE — 86900 BLOOD TYPING SEROLOGIC ABO: CPT

## 2020-06-18 PROCEDURE — 85025 COMPLETE CBC W/AUTO DIFF WBC: CPT

## 2020-06-18 PROCEDURE — 99223 1ST HOSP IP/OBS HIGH 75: CPT | Performed by: HOSPITALIST

## 2020-06-18 RX ORDER — ONDANSETRON 2 MG/ML
4 INJECTION INTRAMUSCULAR; INTRAVENOUS ONCE
Status: COMPLETED | OUTPATIENT
Start: 2020-06-18 | End: 2020-06-18

## 2020-06-18 RX ADMIN — FENTANYL CITRATE 50 MCG: 50 INJECTION INTRAMUSCULAR; INTRAVENOUS at 22:02

## 2020-06-18 RX ADMIN — ONDANSETRON 4 MG: 2 INJECTION INTRAMUSCULAR; INTRAVENOUS at 22:02

## 2020-06-18 ASSESSMENT — FIBROSIS 4 INDEX: FIB4 SCORE: 4.02

## 2020-06-19 ENCOUNTER — TELEPHONE (OUTPATIENT)
Dept: NEPHROLOGY | Facility: MEDICAL CENTER | Age: 25
End: 2020-06-19

## 2020-06-19 ENCOUNTER — PATIENT OUTREACH (OUTPATIENT)
Dept: HEALTH INFORMATION MANAGEMENT | Facility: OTHER | Age: 25
End: 2020-06-19

## 2020-06-19 VITALS
DIASTOLIC BLOOD PRESSURE: 91 MMHG | HEIGHT: 67 IN | RESPIRATION RATE: 18 BRPM | BODY MASS INDEX: 22.98 KG/M2 | OXYGEN SATURATION: 99 % | SYSTOLIC BLOOD PRESSURE: 153 MMHG | HEART RATE: 91 BPM | WEIGHT: 146.39 LBS | TEMPERATURE: 98.2 F

## 2020-06-19 PROBLEM — K29.70 GASTRITIS: Status: ACTIVE | Noted: 2020-06-19

## 2020-06-19 LAB
BLD GP AB INVEST PLASRBC-IMP: ABNORMAL
HGB BLD-MCNC: 5.9 G/DL (ref 12–16)

## 2020-06-19 PROCEDURE — 36430 TRANSFUSION BLD/BLD COMPNT: CPT

## 2020-06-19 PROCEDURE — 90935 HEMODIALYSIS ONE EVALUATION: CPT

## 2020-06-19 PROCEDURE — 86870 RBC ANTIBODY IDENTIFICATION: CPT

## 2020-06-19 PROCEDURE — 99255 IP/OBS CONSLTJ NEW/EST HI 80: CPT | Performed by: INTERNAL MEDICINE

## 2020-06-19 PROCEDURE — 700102 HCHG RX REV CODE 250 W/ 637 OVERRIDE(OP): Performed by: HOSPITALIST

## 2020-06-19 PROCEDURE — 86922 COMPATIBILITY TEST ANTIGLOB: CPT | Mod: 91

## 2020-06-19 PROCEDURE — 700111 HCHG RX REV CODE 636 W/ 250 OVERRIDE (IP): Performed by: HOSPITALIST

## 2020-06-19 PROCEDURE — 30233N1 TRANSFUSION OF NONAUTOLOGOUS RED BLOOD CELLS INTO PERIPHERAL VEIN, PERCUTANEOUS APPROACH: ICD-10-PCS | Performed by: HOSPITALIST

## 2020-06-19 PROCEDURE — 86902 BLOOD TYPE ANTIGEN DONOR EA: CPT | Mod: 91

## 2020-06-19 PROCEDURE — 99239 HOSP IP/OBS DSCHRG MGMT >30: CPT | Performed by: HOSPITALIST

## 2020-06-19 PROCEDURE — 36415 COLL VENOUS BLD VENIPUNCTURE: CPT

## 2020-06-19 PROCEDURE — A9270 NON-COVERED ITEM OR SERVICE: HCPCS | Performed by: HOSPITALIST

## 2020-06-19 PROCEDURE — P9016 RBC LEUKOCYTES REDUCED: HCPCS | Mod: 91

## 2020-06-19 PROCEDURE — 85018 HEMOGLOBIN: CPT

## 2020-06-19 PROCEDURE — 700111 HCHG RX REV CODE 636 W/ 250 OVERRIDE (IP): Performed by: INTERNAL MEDICINE

## 2020-06-19 RX ORDER — PROMETHAZINE HYDROCHLORIDE 25 MG/1
12.5-25 SUPPOSITORY RECTAL EVERY 4 HOURS PRN
Status: DISCONTINUED | OUTPATIENT
Start: 2020-06-19 | End: 2020-06-19 | Stop reason: HOSPADM

## 2020-06-19 RX ORDER — PROMETHAZINE HYDROCHLORIDE 25 MG/1
12.5-25 TABLET ORAL EVERY 4 HOURS PRN
Status: DISCONTINUED | OUTPATIENT
Start: 2020-06-19 | End: 2020-06-19 | Stop reason: HOSPADM

## 2020-06-19 RX ORDER — AMOXICILLIN 250 MG
2 CAPSULE ORAL 2 TIMES DAILY
Status: DISCONTINUED | OUTPATIENT
Start: 2020-06-19 | End: 2020-06-19 | Stop reason: HOSPADM

## 2020-06-19 RX ORDER — MYCOPHENOLIC ACID 360 MG/1
360 TABLET, DELAYED RELEASE ORAL EVERY EVENING
Status: DISCONTINUED | OUTPATIENT
Start: 2020-06-19 | End: 2020-06-19 | Stop reason: HOSPADM

## 2020-06-19 RX ORDER — AMLODIPINE BESYLATE 5 MG/1
10 TABLET ORAL EVERY EVENING
Status: DISCONTINUED | OUTPATIENT
Start: 2020-06-19 | End: 2020-06-19 | Stop reason: HOSPADM

## 2020-06-19 RX ORDER — PROCHLORPERAZINE EDISYLATE 5 MG/ML
5-10 INJECTION INTRAMUSCULAR; INTRAVENOUS EVERY 4 HOURS PRN
Status: DISCONTINUED | OUTPATIENT
Start: 2020-06-19 | End: 2020-06-19 | Stop reason: HOSPADM

## 2020-06-19 RX ORDER — POLYETHYLENE GLYCOL 3350 17 G/17G
1 POWDER, FOR SOLUTION ORAL
Status: DISCONTINUED | OUTPATIENT
Start: 2020-06-19 | End: 2020-06-19 | Stop reason: HOSPADM

## 2020-06-19 RX ORDER — BISACODYL 10 MG
10 SUPPOSITORY, RECTAL RECTAL
Status: DISCONTINUED | OUTPATIENT
Start: 2020-06-19 | End: 2020-06-19 | Stop reason: HOSPADM

## 2020-06-19 RX ORDER — HYDROXYCHLOROQUINE SULFATE 200 MG/1
200 TABLET, FILM COATED ORAL EVERY EVENING
Status: DISCONTINUED | OUTPATIENT
Start: 2020-06-19 | End: 2020-06-19 | Stop reason: HOSPADM

## 2020-06-19 RX ORDER — PANTOPRAZOLE SODIUM 40 MG/1
40 TABLET, DELAYED RELEASE ORAL 2 TIMES DAILY
Status: DISCONTINUED | OUTPATIENT
Start: 2020-06-19 | End: 2020-06-19

## 2020-06-19 RX ORDER — LORAZEPAM 2 MG/ML
1 INJECTION INTRAMUSCULAR EVERY 4 HOURS PRN
Status: DISCONTINUED | OUTPATIENT
Start: 2020-06-19 | End: 2020-06-19 | Stop reason: HOSPADM

## 2020-06-19 RX ORDER — OMEPRAZOLE 20 MG/1
20 CAPSULE, DELAYED RELEASE ORAL 2 TIMES DAILY
Status: DISCONTINUED | OUTPATIENT
Start: 2020-06-19 | End: 2020-06-19 | Stop reason: HOSPADM

## 2020-06-19 RX ORDER — ONDANSETRON 2 MG/ML
4 INJECTION INTRAMUSCULAR; INTRAVENOUS EVERY 4 HOURS PRN
Status: DISCONTINUED | OUTPATIENT
Start: 2020-06-19 | End: 2020-06-19 | Stop reason: HOSPADM

## 2020-06-19 RX ORDER — OXYCODONE HYDROCHLORIDE 5 MG/1
5 TABLET ORAL EVERY 4 HOURS PRN
Status: DISCONTINUED | OUTPATIENT
Start: 2020-06-19 | End: 2020-06-19 | Stop reason: HOSPADM

## 2020-06-19 RX ORDER — ATENOLOL 25 MG/1
25 TABLET ORAL TWICE DAILY
Status: DISCONTINUED | OUTPATIENT
Start: 2020-06-19 | End: 2020-06-19 | Stop reason: HOSPADM

## 2020-06-19 RX ORDER — DIPHENHYDRAMINE HYDROCHLORIDE 50 MG/ML
25 INJECTION INTRAMUSCULAR; INTRAVENOUS EVERY 6 HOURS PRN
Status: DISCONTINUED | OUTPATIENT
Start: 2020-06-19 | End: 2020-06-19 | Stop reason: HOSPADM

## 2020-06-19 RX ORDER — ACETAMINOPHEN 500 MG
1000 TABLET ORAL EVERY 6 HOURS PRN
Status: DISCONTINUED | OUTPATIENT
Start: 2020-06-19 | End: 2020-06-19 | Stop reason: HOSPADM

## 2020-06-19 RX ORDER — PREDNISONE 10 MG/1
5 TABLET ORAL EVERY EVENING
Status: DISCONTINUED | OUTPATIENT
Start: 2020-06-19 | End: 2020-06-19 | Stop reason: HOSPADM

## 2020-06-19 RX ORDER — ONDANSETRON 4 MG/1
4 TABLET, ORALLY DISINTEGRATING ORAL EVERY 4 HOURS PRN
Status: DISCONTINUED | OUTPATIENT
Start: 2020-06-19 | End: 2020-06-19 | Stop reason: HOSPADM

## 2020-06-19 RX ADMIN — OMEPRAZOLE 20 MG: 20 CAPSULE, DELAYED RELEASE ORAL at 17:34

## 2020-06-19 RX ADMIN — ATENOLOL 25 MG: 25 TABLET ORAL at 17:33

## 2020-06-19 RX ADMIN — EPOETIN ALFA-EPBX 3000 UNITS: 3000 INJECTION, SOLUTION INTRAVENOUS; SUBCUTANEOUS at 14:01

## 2020-06-19 RX ADMIN — AMLODIPINE BESYLATE 10 MG: 5 TABLET ORAL at 17:33

## 2020-06-19 RX ADMIN — DIPHENHYDRAMINE HYDROCHLORIDE 25 MG: 50 INJECTION, SOLUTION INTRAMUSCULAR; INTRAVENOUS at 13:37

## 2020-06-19 RX ADMIN — ONDANSETRON 4 MG: 2 INJECTION INTRAMUSCULAR; INTRAVENOUS at 07:30

## 2020-06-19 RX ADMIN — OXYCODONE HYDROCHLORIDE 5 MG: 5 TABLET ORAL at 01:42

## 2020-06-19 RX ADMIN — HYDROXYCHLOROQUINE SULFATE 200 MG: 200 TABLET, FILM COATED ORAL at 17:34

## 2020-06-19 RX ADMIN — OMEPRAZOLE 20 MG: 20 CAPSULE, DELAYED RELEASE ORAL at 05:34

## 2020-06-19 RX ADMIN — ATENOLOL 25 MG: 25 TABLET ORAL at 05:34

## 2020-06-19 RX ADMIN — LORAZEPAM 1 MG: 2 INJECTION INTRAMUSCULAR; INTRAVENOUS at 16:33

## 2020-06-19 RX ADMIN — DIPHENHYDRAMINE HYDROCHLORIDE 25 MG: 50 INJECTION, SOLUTION INTRAMUSCULAR; INTRAVENOUS at 07:42

## 2020-06-19 RX ADMIN — PREDNISONE 5 MG: 10 TABLET ORAL at 17:34

## 2020-06-19 RX ADMIN — LORAZEPAM 1 MG: 2 INJECTION INTRAMUSCULAR; INTRAVENOUS at 03:28

## 2020-06-19 RX ADMIN — ONDANSETRON 4 MG: 2 INJECTION INTRAMUSCULAR; INTRAVENOUS at 16:33

## 2020-06-19 ASSESSMENT — LIFESTYLE VARIABLES
TOTAL SCORE: 0
HAVE YOU EVER FELT YOU SHOULD CUT DOWN ON YOUR DRINKING: NO
HOW MANY TIMES IN THE PAST YEAR HAVE YOU HAD 5 OR MORE DRINKS IN A DAY: 0
TOTAL SCORE: 0
CONSUMPTION TOTAL: NEGATIVE
EVER FELT BAD OR GUILTY ABOUT YOUR DRINKING: NO
ALCOHOL_USE: NO
HAVE PEOPLE ANNOYED YOU BY CRITICIZING YOUR DRINKING: NO
AVERAGE NUMBER OF DAYS PER WEEK YOU HAVE A DRINK CONTAINING ALCOHOL: 0
ON A TYPICAL DAY WHEN YOU DRINK ALCOHOL HOW MANY DRINKS DO YOU HAVE: 0
TOTAL SCORE: 0
EVER HAD A DRINK FIRST THING IN THE MORNING TO STEADY YOUR NERVES TO GET RID OF A HANGOVER: NO
EVER_SMOKED: NEVER

## 2020-06-19 ASSESSMENT — COGNITIVE AND FUNCTIONAL STATUS - GENERAL
DAILY ACTIVITIY SCORE: 24
SUGGESTED CMS G CODE MODIFIER MOBILITY: CH
SUGGESTED CMS G CODE MODIFIER DAILY ACTIVITY: CH
MOBILITY SCORE: 24

## 2020-06-19 ASSESSMENT — ENCOUNTER SYMPTOMS
CARDIOVASCULAR NEGATIVE: 1
GASTROINTESTINAL NEGATIVE: 1
HEADACHES: 1
MUSCULOSKELETAL NEGATIVE: 1
PSYCHIATRIC NEGATIVE: 1
CHILLS: 0
WEAKNESS: 1
ABDOMINAL PAIN: 0
VOMITING: 0
LOSS OF CONSCIOUSNESS: 0
DEPRESSION: 0
BRUISES/BLEEDS EASILY: 0
WEIGHT LOSS: 0
COUGH: 0
BACK PAIN: 0
RESPIRATORY NEGATIVE: 1
NAUSEA: 0
SHORTNESS OF BREATH: 0
FEVER: 0
FLANK PAIN: 0
NERVOUS/ANXIOUS: 0
DIZZINESS: 0
MYALGIAS: 0

## 2020-06-19 NOTE — CARE PLAN
Problem: Communication  Goal: The ability to communicate needs accurately and effectively will improve  Outcome: PROGRESSING AS EXPECTED  Intervention: [place] patient and significant other/support system to call light to alert staff of needs  Flowsheets (Taken 6/19/2020 4575)  Oriented to::   Unit Routine   Call Light & Bedside Controls     Problem: Knowledge Deficit  Goal: Knowledge of disease process/condition, treatment plan, diagnostic tests, and medications will improve  Outcome: PROGRESSING AS EXPECTED     Problem: Discharge Barriers/Planning  Goal: Patient's continuum of care needs will be met  Outcome: PROGRESSING AS EXPECTED

## 2020-06-19 NOTE — DISCHARGE SUMMARY
Discharge Summary    CHIEF COMPLAINT ON ADMISSION  Chief Complaint   Patient presents with   • Abnormal Labs     reports hgb=5       Reason for Admission  Abnormal labs     Admission Date  6/18/2020    CODE STATUS  Prior    HPI & HOSPITAL COURSE  This is a 24 y.o. female here with abnormal labs. She has a history of ESRD related to lupus with recurrent anemia. She was transfused and had dialysis here without complication. She will follow up with her PCP.         Therefore, she is discharged in good and stable condition to home with close outpatient follow-up.    The patient recovered much more quickly than anticipated on admission.    Discharge Date  6/19/2020    FOLLOW UP ITEMS POST DISCHARGE  pcp    DISCHARGE DIAGNOSES  Principal Problem:    Symptomatic anemia POA: Yes  Active Problems:    ESRD (end stage renal disease) on dialysis (HCC) POA: Yes    Lupus (HCC) POA: Yes    Hyponatremia POA: Yes    Gastritis POA: Unknown  Resolved Problems:    * No resolved hospital problems. *      FOLLOW UP  No future appointments.  Ella Matthew M.D.  [address] [place] [id number]  [phone]    Call  Per office please call and schedule your hospital follow up. Thank you.       MEDICATIONS ON DISCHARGE     Medication List      CONTINUE taking these medications      Instructions   amLODIPine 10 MG Tabs  Commonly known as:  NORVASC   Take 10 mg by mouth every evening.  Dose:  10 mg     atenolol 25 MG Tabs  Commonly known as:  TENORMIN   Take 1 tablet by mouth once daily     hydroxychloroquine 200 MG Tabs  Commonly known as:  Plaquenil   Take 200 mg by mouth every evening.  Dose:  200 mg     Myfortic 360 MG Tbec tablet  Generic drug:  mycophenolate sodium   Take 360 mg by mouth every evening.  Dose:  360 mg     ondansetron 4 MG Tbdp  Commonly known as:  ZOFRAN ODT   Take 1 Tab by mouth every four hours as needed for Nausea (give PO if no IV route available).  Dose:  4 mg     pantoprazole 40 MG Tbec  Commonly known as:   PROTONIX   Take 1 Tab by mouth 2 times a day. After completed resume previous dose of Prilosec OTC  Dose:  40 mg     predniSONE 5 MG Tabs  Commonly known as:  DELTASONE   Take 5 mg by mouth every evening.  Dose:  5 mg            Allergies  Allergies   Allergen Reactions   • Clindamycin Rash     Hive   • Keflex Rash     Hives   • Metoprolol Nausea       DIET  No orders of the defined types were placed in this encounter.      ACTIVITY  As tolerated.  Weight bearing as tolerated    CONSULTATIONS  nephrology    PROCEDURES  nonr    LABORATORY  Lab Results   Component Value Date    SODIUM 130 (L) 06/18/2020    POTASSIUM 4.7 06/18/2020    CHLORIDE 93 (L) 06/18/2020    CO2 28 06/18/2020    GLUCOSE 88 06/18/2020    BUN 38 (H) 06/18/2020    CREATININE 7.17 (HH) 06/18/2020        Lab Results   Component Value Date    WBC 5.3 06/18/2020    HEMOGLOBIN 5.9 (LL) 06/19/2020    HEMATOCRIT 17.5 (LL) 06/18/2020    PLATELETCT 109 (L) 06/18/2020        Total time of the discharge process exceeds 33 minutes.

## 2020-06-19 NOTE — H&P
[facility] Medicine History & Physical Note    Date of Service  6/19/2020    Primary Care Physician  Ella Matthew M.D.    Code Status  Full Code    Chief Complaint  Chief Complaint   Patient presents with   • Abnormal Labs     reports hgb=5       History of Presenting Illness  24 y.o. female who presented 6/18/2020 with severe symptomatic anemia. She was treated for upper gi bleed and discharged May 31. EGD showed nida thao tear, gastritis and duodenitis. Her hemoglobin improved and was 7.9 on discharge. At dialysis her labs were drawn and found to have a hemoglobin of 5. She has been complaining of headaches, malaise and weakness.    I discussed the presenting symptoms, physical examination, lab and radiological study results with the emergency department physician.      Review of Systems  Review of Systems   Constitutional: Positive for malaise/fatigue. Negative for chills, fever and weight loss.   HENT: Negative.    Respiratory: Negative.  Negative for cough and shortness of breath.    Cardiovascular: Negative.  Negative for chest pain and leg swelling.   Gastrointestinal: Negative.  Negative for abdominal pain, nausea and vomiting.   Genitourinary: Negative.  Negative for dysuria and flank pain.   Musculoskeletal: Negative.  Negative for back pain and myalgias.   Neurological: Positive for weakness and headaches. Negative for dizziness and loss of consciousness.   Endo/Heme/Allergies: Negative.  Does not bruise/bleed easily.   Psychiatric/Behavioral: Negative.  Negative for depression. The patient is not nervous/anxious.    All other systems reviewed and are negative.      Past Medical History   has a past medical history of Anemia (01/17/2018), AVF (arteriovenous fistula) ([facility]), Dialysis patient ([facility]), ESRD (end stage renal disease) on dialysis ([facility]) (01/17/2018), Heart burn, Hypertension (01/17/2018), Indigestion, Lupus ([facility]), Migraines (01/17/2018), and Seizure ([facility]) (2013).    Surgical History   has a  past surgical history that includes ronak by laparoscopy (4/5/2010); av fistula creation (Right); angioplasty (01/17/2018); other; other; gastroscopy-endo (12/9/2019); and gastroscopy (N/A, 5/30/2020).     Family History  family history includes Diabetes in her paternal grandmother.     Social History   reports that she has never smoked. She has never used smokeless tobacco. She reports that she does not drink alcohol or use drugs.    Allergies  Allergies   Allergen Reactions   • Clindamycin Rash     Hive   • Keflex Rash     Hives   • Metoprolol Nausea       Medications  Prior to Admission Medications   Prescriptions Last Dose Informant Patient Reported? Taking?   amLODIPine (NORVASC) 10 MG Tab 6/17/2020 at Unknown time Patient Yes No   Sig: Take 10 mg by mouth every evening.   atenolol (TENORMIN) 25 MG Tab 6/17/2020 at Unknown time Patient No No   Sig: Take 1 tablet by mouth once daily   hydroxychloroquine (PLAQUENIL) 200 MG Tab 6/17/2020 at Unknown time Patient Yes No   Sig: Take 200 mg by mouth every evening.   mycophenolate sodium (MYFORTIC) 360 MG Tablet Delayed Response tablet 6/17/2020 at Unknown time Patient Yes No   Sig: Take 360 mg by mouth every evening.   ondansetron (ZOFRAN ODT) 4 MG TABLET DISPERSIBLE prn  No No   Sig: Take 1 Tab by mouth every four hours as needed for Nausea (give PO if no IV route available).   pantoprazole (PROTONIX) 40 MG Tablet Delayed Response 6/17/2020 at Unknown time  No No   Sig: Take 1 Tab by mouth 2 times a day. After completed resume previous dose of Prilosec OTC   predniSONE (DELTASONE) 5 MG Tab 6/17/2020 at Unknown time Patient Yes No   Sig: Take 5 mg by mouth every evening.      Facility-Administered Medications: None       Physical Exam  Temp:  [36.8 °C (98.3 °F)-36.9 °C (98.5 °F)] 36.8 °C (98.3 °F)  Pulse:  [] 93  Resp:  [18-20] 18  BP: (132-150)/() 132/89  SpO2:  [94 %-100 %] 100 %    Physical Exam  Vitals signs and nursing note reviewed.   Constitutional:  "      General: She is not in acute distress.     Appearance: She is well-developed. She is not diaphoretic.   HENT:      Head:      Comments: \"moon facies\" from steroids     Right Ear: External ear normal.      Left Ear: External ear normal.      Nose: Nose normal.      Mouth/Throat:      Mouth: Mucous membranes are dry.      Pharynx: No oropharyngeal exudate.   Eyes:      General: No scleral icterus.        Right eye: No discharge.         Left eye: No discharge.      Conjunctiva/sclera: Conjunctivae normal.   Neck:      Vascular: No JVD.      Trachea: No tracheal deviation.   Cardiovascular:      Rate and Rhythm: Normal rate and regular rhythm.      Heart sounds: Normal heart sounds.   Pulmonary:      Effort: Pulmonary effort is normal. No respiratory distress.      Breath sounds: Normal breath sounds. No stridor. No wheezing or rales.   Chest:      Chest wall: No tenderness.   Abdominal:      General: Bowel sounds are normal. There is no distension.      Palpations: Abdomen is soft.      Tenderness: There is no abdominal tenderness.   Musculoskeletal:         General: No tenderness.   Skin:     General: Skin is warm and dry.      Coloration: Skin is pale.      Comments: Multiple sores on face in various stages of healing and healed scarring on face   Neurological:      Mental Status: She is alert and oriented to person, place, and time.      Cranial Nerves: No cranial nerve deficit.      Motor: No abnormal muscle tone.   Psychiatric:         Behavior: Behavior normal.         Laboratory:  Recent Labs     06/18/20 2155   WBC 5.3   RBC 1.75*   HEMOGLOBIN 4.9*   HEMATOCRIT 17.5*   .0*   MCH 28.0   MCHC 28.0*   RDW 74.6*   PLATELETCT 109*   MPV 10.5     Recent Labs     06/18/20 2155   SODIUM 130*   POTASSIUM 4.7   CHLORIDE 93*   CO2 28   GLUCOSE 88   BUN 38*   CREATININE 7.17*   CALCIUM 8.9     Recent Labs     06/18/20 2155   ALTSGPT 11   ASTSGOT 30   ALKPHOSPHAT 59   TBILIRUBIN 0.3   GLUCOSE 88         No " results for input(s): NTPROBNP in the last 72 hours.      No results for input(s): TROPONINT in the last 72 hours.    Imaging:  No orders to display         Assessment/Plan:      * Symptomatic anemia- (present on admission)  Assessment & Plan  Due to recent GI bleeding in the setting of renal disease bone marrow has been unable to recover from the blood loss anemia.  Transfuse packed red blood cells with goal to get hemoglobin over 7.  EPO per nephrology as indicated  She has antibodies and will take some time to get blood crossmatched    I have explained to the patient the risks and benefits of transfusion of blood products.  This includes, as appropriate, the risk of mild allergic reaction, hemolytic reaction, transfusion-associated lung injury, febrile reactions, circulatory or iron overload, and infection.    We discussed possible alternatives and their risks, including directed donation, autologous transfusion, and no transfusion, including IV or oral iron supplementation, as appropriate.  I believe the patient understands the risks and benefits and was able to express understanding.      ESRD (end stage renal disease) on dialysis (HCC)- (present on admission)  Assessment & Plan  Due to Lupus  M/W/F schedule, Dr. Najjar called from ER and will arrange for dialysis.  EPO per nephrology    Gastritis  Assessment & Plan  Recent diagnosis, continue prilosec 20mg daily.    Hyponatremia- (present on admission)  Assessment & Plan  Dialysis tomorrow to correct    Lupus (HCC)- (present on admission)  Assessment & Plan  Continue hydroxychloroquine, mycophenolate and prednisone.

## 2020-06-19 NOTE — DISCHARGE PLANNING
SW spoke with RN, patient is getting dialyzed and getting blood transfusion at same time, today, in hospital.     SW updated dialysis coordinator.

## 2020-06-19 NOTE — DISCHARGE PLANNING
Patient is a 24-year old single female that lives with her parents in [place], [place]. Patient is unemployed, has [employer] insurance.     PCP is Dr. Matthew. Patient also seen by [facility] cardiology, transplant clinic, and for infusion therapy. No issues with ADLs or IADLs. No home O2, no hospital O2. Pharmacy is [facility]. No issues with drugs, alcohol, or mental health. However, patient is currently anxious being in hospital without her mother.     Patient is pending blood transfusion. Care coordination available for D/C planning.

## 2020-06-19 NOTE — PROGRESS NOTES
Pharmacy from [facility] called, stated that we can not supply Myfortic tab. , Will ask pt to remind family member to bring Myfortic supply from home.

## 2020-06-19 NOTE — ED PROVIDER NOTES
ED Provider Note    ER PROVIDER NOTE        CHIEF COMPLAINT  Chief Complaint   Patient presents with   • Abnormal Labs     reports hgb=5       HPI  Lily Nicole is a 24 y.o. female who presents to the emergency department sent in by her nephrologist for concern of low hemoglobin of 5.  Patient reports she has had some generalized weakness and intermittent headaches otherwise no real complaints.  No chest pain or shortness of breath.  No abdominal pain, nausea or vomiting.  No hematemesis, no blood in her stool, no dark tarry stool    Dialysis dependence with history of lupus, also had recent admission for GI bleed    REVIEW OF SYSTEMS  Pertinent positives include weakness. Pertinent negatives include no chest pain. See HPI for details. All other systems reviewed and are negative.    PAST MEDICAL HISTORY   has a past medical history of Anemia (01/17/2018), AVF (arteriovenous fistula) ([facility]), Dialysis patient ([facility]), ESRD (end stage renal disease) on dialysis ([facility]) (01/17/2018), Heart burn, Hypertension (01/17/2018), Indigestion, Lupus ([facility]), Migraines (01/17/2018), and Seizure ([facility]) (2013).    SURGICAL HISTORY   has a past surgical history that includes ronak by laparoscopy (4/5/2010); av fistula creation (Right); angioplasty (01/17/2018); other; other; gastroscopy-endo (12/9/2019); and gastroscopy (N/A, 5/30/2020).    FAMILY HISTORY  Family History   Problem Relation Age of Onset   • Diabetes Paternal Grandmother        SOCIAL HISTORY  Social History     Socioeconomic History   • Marital status: Single     Spouse name: Not on file   • Number of children: Not on file   • Years of education: Not on file   • Highest education level: Not on file   Occupational History   • Not on file   Social Needs   • Financial resource strain: Not on file   • Food insecurity     Worry: Never true     Inability: Never true   • Transportation needs     Medical: No     Non-medical: No   Tobacco Use   • Smoking status: Never Smoker  "  • Smokeless tobacco: Never Used   Substance and Sexual Activity   • Alcohol use: No   • Drug use: No   • Sexual activity: Not on file   Lifestyle   • Physical activity     Days per week: Not on file     Minutes per session: Not on file   • Stress: Not on file   Relationships   • Social connections     Talks on phone: Not on file     Gets together: Not on file     Attends Buddhist service: Not on file     Active member of club or organization: Not on file     Attends meetings of clubs or organizations: Not on file     Relationship status: Not on file   • Intimate partner violence     Fear of current or ex partner: Not on file     Emotionally abused: Not on file     Physically abused: Not on file     Forced sexual activity: Not on file   Other Topics Concern   • Not on file   Social History Narrative   • Not on file      Social History     Substance and Sexual Activity   Drug Use No       CURRENT MEDICATIONS  Home Medications     Reviewed by Lorri Barber R.N. (Registered Nurse) on 06/18/20 at 2020  Med List Status: Partial   Medication Last Dose Status   amLODIPine (NORVASC) 10 MG Tab 6/17/2020 Active   atenolol (TENORMIN) 25 MG Tab 6/17/2020 Active   hydroxychloroquine (PLAQUENIL) 200 MG Tab 6/17/2020 Active   mycophenolate sodium (MYFORTIC) 360 MG Tablet Delayed Response tablet 6/17/2020 Active   ondansetron (ZOFRAN ODT) 4 MG TABLET DISPERSIBLE prn Active   pantoprazole (PROTONIX) 40 MG Tablet Delayed Response 6/17/2020 Active   predniSONE (DELTASONE) 5 MG Tab 6/17/2020 Active                ALLERGIES  Allergies   Allergen Reactions   • Clindamycin Rash     Hive   • Keflex Rash     Hives   • Metoprolol Nausea       PHYSICAL EXAM  VITAL SIGNS: /102   Pulse (!) 101   Temp 36.9 °C (98.5 °F) (Temporal)   Resp 18   Ht 1.702 m (5' 7\")   Wt 66.4 kg (146 lb 6.2 oz)   LMP 05/21/2020   SpO2 94%   BMI 22.93 kg/m²   Pulse ox interpretation: I interpret this pulse ox as normal.    Constitutional: Alert in no " apparent distress.  HENT: No signs of trauma, Bilateral external ears normal, Nose normal.   Eyes: Pupils are equal and reactive, pale subconjunctival, Non-icteric.   Neck: Normal range of motion, No tenderness, Supple, No stridor.   Lymphatic: No lymphadenopathy noted.   Cardiovascular: Regular rate and rhythm, no murmurs.   Thorax & Lungs: Normal breath sounds, No respiratory distress, No wheezing, No chest tenderness.   Abdomen: Bowel sounds normal, Soft, No tenderness, No masses, No pulsatile masses. No peritoneal signs.  Skin: Warm, Dry, No erythema, No rash.   Back: No bony tenderness, No CVA tenderness.   Extremities: fistulas RUE, Intact distal pulses, No edema, No tenderness, No cyanosis,  Musculoskeletal: Good range of motion in all major joints. No tenderness to palpation or major deformities noted.   Neurologic: Alert , Normal motor function, Normal sensory function, No focal deficits noted.   Psychiatric: Affect normal, Judgment normal, Mood normal.     DIAGNOSTIC STUDIES / PROCEDURES    LABS  Labs Reviewed   CBC WITH DIFFERENTIAL - Abnormal; Notable for the following components:       Result Value    RBC 1.75 (*)     Hemoglobin 4.9 (*)     Hematocrit 17.5 (*)     .0 (*)     MCHC 28.0 (*)     RDW 74.6 (*)     Platelet Count 109 (*)     Lymphocytes 18.20 (*)     Lymphs (Absolute) 0.96 (*)     All other components within normal limits   COMP METABOLIC PANEL - Abnormal; Notable for the following components:    Sodium 130 (*)     Chloride 93 (*)     Bun 38 (*)     Creatinine 7.17 (*)     Globulin 4.8 (*)     All other components within normal limits   COD (ADULT) - Abnormal; Notable for the following components:    Antibody Screen-Cod POS (*)     All other components within normal limits   ESTIMATED GFR - Abnormal; Notable for the following components:    GFR If  9 (*)     GFR If Non  7 (*)     All other components within normal limits   ANTIBODY IDENTIFICATION    TRANSFUSE RED BLOOD CELLS-NURSING COMMUNICATION (UNITS)       All labs reviewed by me.    RADIOLOGY  No orders to display     The radiologist's interpretation of all radiological studies have been reviewed by me.    COURSE & MEDICAL DECISION MAKING  Nursing notes, VS, PMSFHx reviewed in chart.    8:37 PM Patient seen and examined at bedside. Patient will be treated with fentanyl and Zofran. Ordered for CBC, CMP, COD to evaluate her symptoms.     Review of patient records show an admission in late May for upper GI bleed requiring transfusion   underwent EGD showing Karen-Keyes tear, esophagitis, gastritis, duodenitis    Patient's hemoglobin has returned 4.9, order for transfusion    10:15 PM  Patient reevaluated, updated on results, consent as below     I have explained to the patient the risks and benefits of transfusion of blood products.  This includes, as appropriate, the risk of mild allergic reaction, hemolytic reaction, transfusion-associated lung injury, febrile reactions, circulatory or iron overload, and infection.    We discussed possible alternatives and their risks, including directed donation, autologous transfusion, and no transfusion, including IV or oral iron supplementation, as appropriate.  I believe the patient understands the risks and benefits and was able to express understanding.    10:30 PM  Have discussed case with Dr. Najjar for dialysis as inpatient        Decision Making:  This is a 24 y.o. female presented with generalized weakness, and is anemic with hemoglobin 4.9.  The some component of acute on chronic with a recent GI bleed as well as her kidney disease.  Will be hospitalized for transfusion.  At this point she is had no continued symptoms of significant GI bleed.  Additionally discussed case with her nephrologist to arrange for her regularly scheduled dialysis tomorrow.    Patient is hospitalized in stable condition    FINAL IMPRESSION  1. Anemia, unspecified type         The  note accurately reflects work and decisions made by me.  Mauro Spencer M.D.  6/18/2020  10:58 PM

## 2020-06-19 NOTE — DISCHARGE PLANNING
Outpatient Dialysis Note    Confirmed patient is active at:    [facility]  [address] [place] [address], [place] [address]      Schedule: Monday, Wednesday, Friday  Time: 6:00 am    Spoke with Leila at facility who confirmed.    Forwarded records for review.    [facility] can take the patient this afternoon and patient needs to be there by 3:00 pm.      Preethi Chao- Dialysis Coordinator  [facility] Ph# [phone]

## 2020-06-19 NOTE — CONSULTS
DATE OF SERVICE:  06/19/2020    REQUESTING PHYSICIAN:  Delbert Spencer MD from the emergency room service.    REASON FOR CONSULTATION:  Management of chronic kidney disease, assessing the   need for dialysis.    The patient seen and examined, medical record reviewed.    HISTORY OF PRESENT ILLNESS:  The patient is an unfortunate 24-year-old lady   with a past medical history significant for end-stage renal disease, undergoes   hemodialysis on a Monday, Wednesday and Friday schedule, presented to the   hospital with abnormal labs.  The patient was found to have severe anemia with   hemoglobin down to 5.  The patient has a history of Karen-Keyes tear and   upper GI bleed.  The patient has been admitted.  We were consulted to manage   her kidney disease, assessing the need for dialysis.    Her last dialysis was on 06/17/2020.    The patient is doing better today.  No chest pain, no shortness of breath, no   hematochezia.    PAST MEDICAL HISTORY:  Significant for:  1.  End-stage renal disease.  2.  Anemia.  3.  GI bleed.    ALLERGIES:  The list was reviewed.    SOCIAL HISTORY:  The patient has no smoking history.    FAMILY HISTORY:  Positive for diabetes.    MEDICATIONS:  Reviewed.    REVIEW OF SYSTEMS:  The patient feels weak.  She has decreased p.o. intake and   appetite.  All other review of system is negative except outlined in history   of present illness.    PHYSICAL EXAMINATION:  GENERAL:  The patient is in no apparent distress.  VITAL SIGNS:  Showed blood pressure of 143/96, heart rate was 86, respiratory   rate was 20.  HEENT:  Normocephalic, atraumatic.  Sclerae are anicteric.  Pupils are   reactive.  Nose is normal.  Mucous membranes moist.  NECK:  No lymphadenopathy, no JVD, no thyroid mass.  CHEST:  Normal.  LUNGS:  Clear to auscultation.  HEART:  S1, S2.  ABDOMEN:  Soft, nontender, no hepatosplenomegaly.  There is no inguinal   lymphadenopathy.  EXTREMITIES:  There is trace lower extremity  edema.  SKIN:  No skin rashes.  NEUROLOGIC:  The patient is alert, oriented x3.  MOOD:  The patient is anxious.    LABORATORY DATA:  The labs from yesterday were reviewed.  Her potassium   yesterday was 4.7.    ASSESSMENT AND PLAN:  1.  End-stage renal disease.  2.  Severe anemia.  3.  Uncontrolled hypertension.    PLAN:  1.  We will plan on dialysis to manage her uremia.  We will hold any heparin   products because of the possible GI bleed.  2.  Erythropoietin with dialysis.  3.  Renal diet.  4.  Renal dose all medications.  5.  Daily labs.  6.  Assess daily the need for dialysis.  7.  Prognosis is guarded.    Plan discussed in detail with Dr. Spencer.       ____________________________________     FADI NAJJAR, MD FN / MADHU    DD:  06/19/2020 13:45:08  DT:  06/19/2020 16:17:38    D#:  [id number]  Job#:  [id number]

## 2020-06-19 NOTE — ED TRIAGE NOTES
Pt with 5 year hx of dialysis m-w-f due to lupus sent here by nephrology after blood work yesterday reported hgb=5 per pt. dneies syncope,/dizziness/pain. Scheduled for dialysis in am. Med rec completed, denies known covid exposure or recent travel

## 2020-06-19 NOTE — PROGRESS NOTES
"Pt called and stated that \"I dont like being in the hospital. My mom is usually with me\". Pt seems anxious and emotional about the surroundings and that she has to wait for the blood transfusion. Pt is sobbing and she is requesting anti-anxiety meds. No meds on MAR  Will call on-call hospitalist for order.    Dr. Shaikh returned call and order received: 1mg IV - Ativan q4 hours   "

## 2020-06-19 NOTE — ASSESSMENT & PLAN NOTE
Due to recent GI bleeding in the setting of renal disease bone marrow has been unable to recover from the blood loss anemia.  Transfuse packed red blood cells with goal to get hemoglobin over 7.  EPO per nephrology as indicated  She has antibodies and will take some time to get blood crossmatched    I have explained to the patient the risks and benefits of transfusion of blood products.  This includes, as appropriate, the risk of mild allergic reaction, hemolytic reaction, transfusion-associated lung injury, febrile reactions, circulatory or iron overload, and infection.    We discussed possible alternatives and their risks, including directed donation, autologous transfusion, and no transfusion, including IV or oral iron supplementation, as appropriate.  I believe the patient understands the risks and benefits and was able to express understanding.

## 2020-06-19 NOTE — ASSESSMENT & PLAN NOTE
Due to Lupus  M/W/F schedule, Dr. Najjar called from ER and will arrange for dialysis.  EPO per nephrology

## 2020-06-19 NOTE — DISCHARGE INSTRUCTIONS
Discharge Instructions    Discharged to home by car with relative. Discharged via walking, hospital escort: Yes.  Special equipment needed: Not Applicable    Be sure to schedule a follow-up appointment with your primary care doctor or any specialists as instructed.     Discharge Plan:        I understand that a diet low in cholesterol, fat, and sodium is recommended for good health. Unless I have been given specific instructions below for another diet, I accept this instruction as my diet prescription.   Other diet: Regular diet    Special Instructions:     Anemia, Nonspecific  Anemia is a condition in which the concentration of red blood cells or hemoglobin in the blood is below normal. Hemoglobin is a substance in red blood cells that carries oxygen to the tissues of the body. Anemia results in not enough oxygen reaching these tissues.  What are the causes?  Common causes of anemia include:  · Excessive bleeding. Bleeding may be internal or external. This includes excessive bleeding from periods (in women) or from the intestine.  · Poor nutrition.  · Chronic kidney, thyroid, and liver disease.  · Bone marrow disorders that decrease red blood cell production.  · Cancer and treatments for cancer.  · HIV, AIDS, and their treatments.  · Spleen problems that increase red blood cell destruction.  · Blood disorders.  · Excess destruction of red blood cells due to infection, medicines, and autoimmune disorders.  What are the signs or symptoms?  · Minor weakness.  · Dizziness.  · Headache.  · Palpitations.  · Shortness of breath, especially with exercise.  · Paleness.  · Cold sensitivity.  · Indigestion.  · Nausea.  · Difficulty sleeping.  · Difficulty concentrating.  Symptoms may occur suddenly or they may develop slowly.  How is this diagnosed?  Additional blood tests are often needed. These help your health care provider determine the best treatment. Your health care provider will check your stool for blood and look for  other causes of blood loss.  How is this treated?  Treatment varies depending on the cause of the anemia. Treatment can include:  · Supplements of iron, vitamin B12, or folic acid.  · Hormone medicines.  · A blood transfusion. This may be needed if blood loss is severe.  · Hospitalization. This may be needed if there is significant continual blood loss.  · Dietary changes.  · Spleen removal.  Follow these instructions at home:  Keep all follow-up appointments. It often takes many weeks to correct anemia, and having your health care provider check on your condition and your response to treatment is very important.  Get help right away if:  · You develop extreme weakness, shortness of breath, or chest pain.  · You become dizzy or have trouble concentrating.  · You develop heavy vaginal bleeding.  · You develop a rash.  · You have bloody or black, tarry stools.  · You faint.  · You vomit up blood.  · You vomit repeatedly.  · You have abdominal pain.  · You have a fever or persistent symptoms for more than 2-3 days.  · You have a fever and your symptoms suddenly get worse.  · You are dehydrated.  This information is not intended to replace advice given to you by your health care provider. Make sure you discuss any questions you have with your health care provider.  Document Released: 01/25/2006 Document Revised: 05/31/2017 Document Reviewed: 06/13/2014  [employer] Interactive Patient Education © 2017 [employer]    Blood Transfusion, Care After  Refer to this sheet in the next few weeks. These instructions provide you with information about caring for yourself after your procedure. Your health care provider may also give you more specific instructions. Your treatment has been planned according to current medical practices, but problems sometimes occur. Call your health care provider if you have any problems or questions after your procedure.  WHAT TO EXPECT AFTER THE PROCEDURE  After your procedure, it is common to  have:  · Bruising and soreness at the IV site.  · Chills or fever.  · Headache.  HOME CARE INSTRUCTIONS  · Take medicines only as directed by your health care provider. Ask your health care provider if you can take an over-the-counter pain reliever in case you have a fever or headache a day or two after your transfusion.  · Return to your normal activities as directed by your health care provider.  SEEK MEDICAL CARE IF:   · You develop redness or irritation at your IV site.  · You have persistent fever, chills, or headache.  · Your urine is darker than normal.  · Your urine turns pink, red, or brown.    · The white part of your eye turns yellow (jaundice).    · You feel weak after doing your normal activities.    SEEK IMMEDIATE MEDICAL CARE IF:   · You have trouble breathing.  · You have fever and chills along with:  ¨ Anxiety.  ¨ Chest or back pain.  ¨ Flushed skin.  ¨ Clammy skin.  ¨ A rapid heartbeat.  ¨ Nausea.     This information is not intended to replace advice given to you by your health care provider. Make sure you discuss any questions you have with your health care provider.     Document Released: 01/08/2016 Document Reviewed: 01/08/2016  [employer] Interactive Patient Education ©2016 [employer]    · Is patient discharged on Warfarin / Coumadin?   No     Depression / Suicide Risk    As [facility] are [facility], it is important to learn how to keep safe from harming yourself.    Recognize the warning signs:  · Abrupt changes in personality, positive or negative- including increase in energy   · Giving away possessions  · Change in eating patterns- significant weight changes-  positive or negative  · Change in sleeping patterns- unable to sleep or sleeping all the time   · Unwillingness or inability to communicate  · Depression  · Unusual sadness, discouragement and loneliness  · Talk of wanting to die  · Neglect of personal appearance   · Rebelliousness- reckless  behavior  · Withdrawal from people/activities they love  · Confusion- inability to concentrate     If you or a loved one observes any of these behaviors or has concerns about self-harm, here's what you can do:  · Talk about it- your feelings and reasons for harming yourself  · Remove any means that you might use to hurt yourself (examples: pills, rope, extension cords, firearm)  · Get professional help from the community (Mental Health, Substance Abuse, psychological counseling)  · Do not be alone:Call your Safe Contact- someone whom you trust who will be there for you.  · Call your local CRISIS HOTLINE 794-1703 or 746-003-1201  · Call your local Children's Mobile Crisis Response Team [place] (584) 986-0188 or www.[facility] [place]  · Call the toll free National Suicide Prevention Hotlines   · National Suicide Prevention Lifeline 401-194-BNCJ (3140)  · National Hope Line Network 800-SUICIDE (010-6895)

## 2020-06-19 NOTE — PROGRESS NOTES
Pt from ER dept,via aziza accompanied by er nurse. A/O X 4, steady on ambulation. POC discussed to pt- meds, blood works, blood transfusion, and use for call light for assistance and needs/ verbalize understanding. Call light use for needs encouraged.Will monitor.

## 2020-06-19 NOTE — CARE PLAN
Problem: Safety  Goal: Will remain free from falls  Outcome: PROGRESSING AS EXPECTED  Note: Patient will be free from injury or fall incident- instruction for use of call light given . Call light placed w/. In easy reach.     Problem: Bowel/Gastric:  Goal: Will not experience complications related to bowel motility  Outcome: PROGRESSING AS EXPECTED  Intervention: Implement interventions to promote bowel evacuation if inadequate bowel movements in past 48 hours  Note: Encourage Patient to increase fluid intake and to adhere on schedule laxatives/ stool softeners to improve regularity in bowel . Pt encouraged for early ambulation/ mobility.     Problem: Knowledge Deficit  Goal: Knowledge of disease process/condition, treatment plan, diagnostic tests, and medications will improve  Outcome: PROGRESSING AS EXPECTED  Intervention: Explain information regarding disease process/condition, treatment plan, diagnostic tests, and medications and document in education  Note: Patient/ family member updated on Plan Of Care and Nurses communications with Doctors.

## 2020-06-19 NOTE — PROGRESS NOTES
2 RN skin assessment done; skin not WDL. See Wound flowsheet.Dry scabs on pts face secondary to Acne.

## 2020-06-20 NOTE — PROGRESS NOTES
Hemodialysis done today, started @ 1326 and ended @ 1627 with net UF= 2000ml. 1 Unit of PRBC given, no adverse reaction noted. Report given to MICHELLE Saeed. See flow sheet for details

## 2020-06-21 ENCOUNTER — HOSPITAL ENCOUNTER (EMERGENCY)
Facility: MEDICAL CENTER | Age: 25
End: 2020-06-21
Attending: EMERGENCY MEDICINE
Payer: COMMERCIAL

## 2020-06-21 VITALS
RESPIRATION RATE: 16 BRPM | DIASTOLIC BLOOD PRESSURE: 100 MMHG | HEIGHT: 67 IN | TEMPERATURE: 97.9 F | BODY MASS INDEX: 23.25 KG/M2 | SYSTOLIC BLOOD PRESSURE: 151 MMHG | OXYGEN SATURATION: 95 % | WEIGHT: 148.15 LBS | HEART RATE: 89 BPM

## 2020-06-21 DIAGNOSIS — R51.9 NONINTRACTABLE HEADACHE, UNSPECIFIED CHRONICITY PATTERN, UNSPECIFIED HEADACHE TYPE: ICD-10-CM

## 2020-06-21 DIAGNOSIS — D64.9 ANEMIA, UNSPECIFIED TYPE: ICD-10-CM

## 2020-06-21 LAB
ANION GAP SERPL CALC-SCNC: 11 MMOL/L (ref 7–16)
BASOPHILS # BLD AUTO: 0.5 % (ref 0–1.8)
BASOPHILS # BLD: 0.03 K/UL (ref 0–0.12)
BUN SERPL-MCNC: 40 MG/DL (ref 8–22)
CALCIUM SERPL-MCNC: 8.9 MG/DL (ref 8.4–10.2)
CHLORIDE SERPL-SCNC: 96 MMOL/L (ref 96–112)
CO2 SERPL-SCNC: 25 MMOL/L (ref 20–33)
CREAT SERPL-MCNC: 6.82 MG/DL (ref 0.5–1.4)
EOSINOPHIL # BLD AUTO: 0.13 K/UL (ref 0–0.51)
EOSINOPHIL NFR BLD: 2 % (ref 0–6.9)
ERYTHROCYTE [DISTWIDTH] IN BLOOD BY AUTOMATED COUNT: 70.2 FL (ref 35.9–50)
GLUCOSE SERPL-MCNC: 88 MG/DL (ref 65–99)
HCG SERPL QL: NEGATIVE
HCT VFR BLD AUTO: 24 % (ref 37–47)
HGB BLD-MCNC: 7.2 G/DL (ref 12–16)
IMM GRANULOCYTES # BLD AUTO: 0.05 K/UL (ref 0–0.11)
IMM GRANULOCYTES NFR BLD AUTO: 0.8 % (ref 0–0.9)
LYMPHOCYTES # BLD AUTO: 1.04 K/UL (ref 1–4.8)
LYMPHOCYTES NFR BLD: 15.8 % (ref 22–41)
MCH RBC QN AUTO: 29 PG (ref 27–33)
MCHC RBC AUTO-ENTMCNC: 30 G/DL (ref 33.6–35)
MCV RBC AUTO: 96.8 FL (ref 81.4–97.8)
MONOCYTES # BLD AUTO: 0.47 K/UL (ref 0–0.85)
MONOCYTES NFR BLD AUTO: 7.2 % (ref 0–13.4)
NEUTROPHILS # BLD AUTO: 4.85 K/UL (ref 2–7.15)
NEUTROPHILS NFR BLD: 73.7 % (ref 44–72)
NRBC # BLD AUTO: 0 K/UL
NRBC BLD-RTO: 0 /100 WBC
PLATELET # BLD AUTO: 104 K/UL (ref 164–446)
PMV BLD AUTO: 10.4 FL (ref 9–12.9)
POTASSIUM SERPL-SCNC: 4.9 MMOL/L (ref 3.6–5.5)
RBC # BLD AUTO: 2.48 M/UL (ref 4.2–5.4)
SODIUM SERPL-SCNC: 132 MMOL/L (ref 135–145)
WBC # BLD AUTO: 6.6 K/UL (ref 4.8–10.8)

## 2020-06-21 PROCEDURE — 80048 BASIC METABOLIC PNL TOTAL CA: CPT

## 2020-06-21 PROCEDURE — 85025 COMPLETE CBC W/AUTO DIFF WBC: CPT

## 2020-06-21 PROCEDURE — 99284 EMERGENCY DEPT VISIT MOD MDM: CPT

## 2020-06-21 PROCEDURE — 96374 THER/PROPH/DIAG INJ IV PUSH: CPT

## 2020-06-21 PROCEDURE — 84703 CHORIONIC GONADOTROPIN ASSAY: CPT

## 2020-06-21 PROCEDURE — 700111 HCHG RX REV CODE 636 W/ 250 OVERRIDE (IP): Performed by: EMERGENCY MEDICINE

## 2020-06-21 RX ORDER — PROCHLORPERAZINE MALEATE 10 MG
10 TABLET ORAL EVERY 6 HOURS PRN
Qty: 30 TAB | Refills: 3 | Status: SHIPPED | OUTPATIENT
Start: 2020-06-21 | End: 2020-07-01

## 2020-06-21 RX ORDER — HYDROCODONE BITARTRATE AND ACETAMINOPHEN 5; 325 MG/1; MG/1
1 TABLET ORAL ONCE
Status: DISCONTINUED | OUTPATIENT
Start: 2020-06-21 | End: 2020-06-21

## 2020-06-21 RX ORDER — PROCHLORPERAZINE EDISYLATE 5 MG/ML
10 INJECTION INTRAMUSCULAR; INTRAVENOUS ONCE
Status: COMPLETED | OUTPATIENT
Start: 2020-06-21 | End: 2020-06-21

## 2020-06-21 RX ADMIN — PROCHLORPERAZINE EDISYLATE 10 MG: 5 INJECTION INTRAMUSCULAR; INTRAVENOUS at 04:30

## 2020-06-21 ASSESSMENT — FIBROSIS 4 INDEX: FIB4 SCORE: 1.83

## 2020-06-21 NOTE — ED TRIAGE NOTES
"Pt presents to ED ambulatory c/o constant HA, N/V. Has been taking tylenol with no relief. Last emesis episode was 30 min ago. Was told \"labs looked good on Friday after being discharged from this hospital\". Was admitted for low H&H and received blood transfusion. No apparent distress at this time.  "

## 2020-06-21 NOTE — ED PROVIDER NOTES
ED Provider Note    ER Provider Note         CHIEF COMPLAINT  Chief Complaint   Patient presents with   • Headache   • Anxiety   • N/V       HPI  Lily Nicole is a 24 y.o. female who presents to the Emergency Department with bitemporal headache.  She said it started at 10:00.  She is it has been progressively getting worse over the course of the night.  She denies any neck stiffness.  She denies any fevers or chills.  She has not taken any medication for this.  She was recently admitted and discharged 2 days ago after having a hemoglobin of 4.9.  She was given transfusion.  She says when her blood levels are low she tends to have headaches like this.  The patient does mention that she has felt nauseated.    REVIEW OF SYSTEMS  See HPI for further details. All other systems are negative.     PAST MEDICAL HISTORY   has a past medical history of Anemia (01/17/2018), AVF (arteriovenous fistula) ([facility]), Dialysis patient ([facility]), ESRD (end stage renal disease) on dialysis ([facility]) (01/17/2018), Heart burn, Hypertension (01/17/2018), Indigestion, Lupus ([facility]), Migraines (01/17/2018), and Seizure ([facility]) (2013).    SURGICAL HISTORY   has a past surgical history that includes ronak by laparoscopy (4/5/2010); av fistula creation (Right); angioplasty (01/17/2018); other; other; gastroscopy-endo (12/9/2019); and gastroscopy (N/A, 5/30/2020).    SOCIAL HISTORY  Social History     Tobacco Use   • Smoking status: Never Smoker   • Smokeless tobacco: Never Used   Substance Use Topics   • Alcohol use: No   • Drug use: No      Social History     Substance and Sexual Activity   Drug Use No       FAMILY HISTORY  Family History   Problem Relation Age of Onset   • Diabetes Paternal Grandmother        CURRENT MEDICATIONS  Home Medications     Reviewed by Alli Sotomayor R.N. (Registered Nurse) on 06/21/20 at 0352  Med List Status: <None>   Medication Last Dose Status   amLODIPine (NORVASC) 10 MG Tab  Active   atenolol (TENORMIN) 25 MG Tab   "Active   hydroxychloroquine (PLAQUENIL) 200 MG Tab  Active   mycophenolate sodium (MYFORTIC) 360 MG Tablet Delayed Response tablet  Active   ondansetron (ZOFRAN ODT) 4 MG TABLET DISPERSIBLE  Active   pantoprazole (PROTONIX) 40 MG Tablet Delayed Response  Active   predniSONE (DELTASONE) 5 MG Tab  Active                ALLERGIES  Allergies   Allergen Reactions   • Clindamycin Rash     Hive   • Keflex Rash     Hives   • Metoprolol Nausea       PHYSICAL EXAM  VITAL SIGNS: /100   Pulse 89   Temp 36.6 °C (97.9 °F) (Temporal)   Resp 16   Ht 1.702 m (5' 7\")   Wt 67.2 kg (148 lb 2.4 oz)   SpO2 95%   BMI 23.20 kg/m²      Constitutional: Mild distress  HENT: No signs of trauma, Bilateral external ears normal, Nose normal.   Eyes: Pupils are equal and reactive, Conjunctiva normal, Non-icteric.   Neck: Normal range of motion, No tenderness, Supple, No stridor.   Lymphatic: No lymphadenopathy noted.   Cardiovascular: Regular rate and rhythm, nondisplaced PMI  Thorax & Lungs: No respiratory distress,  No chest tenderness.   Abdomen: Bowel sounds normal, Soft, No tenderness, No masses, No pulsatile masses. No peritoneal signs.  Skin: Warm, Dry, No erythema, No rash.   Back: No bony tenderness, No CVA tenderness.   Extremities: Intact distal pulses, No edema, No tenderness, No cyanosis.  Musculoskeletal: Good range of motion in all major joints. No tenderness to palpation or major deformities noted.   Neurologic: Alert , Normal motor function, Normal sensory function, No focal deficits noted.   Psychiatric: Affect normal, Judgment normal, Mood normal.     DIAGNOSTIC STUDIES / PROCEDURES           LABS  Labs Reviewed   CBC WITH DIFFERENTIAL - Abnormal; Notable for the following components:       Result Value    RBC 2.48 (*)     Hemoglobin 7.2 (*)     Hematocrit 24.0 (*)     MCHC 30.0 (*)     RDW 70.2 (*)     Platelet Count 104 (*)     Neutrophils-Polys 73.70 (*)     Lymphocytes 15.80 (*)     All other components within " normal limits   BASIC METABOLIC PANEL - Abnormal; Notable for the following components:    Sodium 132 (*)     Bun 40 (*)     Creatinine 6.82 (*)     All other components within normal limits   ESTIMATED GFR - Abnormal; Notable for the following components:    GFR If  9 (*)     GFR If Non  8 (*)     All other components within normal limits   HCG QUAL SERUM       All labs reviewed by me.        COURSE & MEDICAL DECISION MAKING  Pertinent Labs & Imaging studies reviewed. (See chart for details)    This is a 24 y.o. female that presents with headache.  She is neurologically intact at this time.  I believe this is likely related to potential anemia.  I will treat the pain with Compazine.  I will check basic labs.  I will reassess after this.      4:16 AM - Patient seen and examined at bedside.       Patient was found to be anemic however does not require transfusion.  She is feeling much improved after the Compazine.  The patient has a creatinine that is significant elevated however she does obtain dialysis.  Her next dialysis is Monday.  Given the fact she is feeling much improved I will discharge home with follow-up with a primary care physician as well as Compazine for home.      FINAL IMPRESSION  1. Anemia, unspecified type    2. Nonintractable headache, unspecified chronicity pattern, unspecified headache type              Electronically signed by: Rivera Fink M.D., 6/21/2020

## 2020-06-28 ENCOUNTER — HOSPITAL ENCOUNTER (EMERGENCY)
Facility: MEDICAL CENTER | Age: 25
End: 2020-06-28
Attending: EMERGENCY MEDICINE | Admitting: EMERGENCY MEDICINE
Payer: COMMERCIAL

## 2020-06-28 VITALS
WEIGHT: 150.79 LBS | TEMPERATURE: 97.4 F | BODY MASS INDEX: 21.59 KG/M2 | HEIGHT: 70 IN | RESPIRATION RATE: 16 BRPM | DIASTOLIC BLOOD PRESSURE: 86 MMHG | HEART RATE: 98 BPM | OXYGEN SATURATION: 100 % | SYSTOLIC BLOOD PRESSURE: 134 MMHG

## 2020-06-28 DIAGNOSIS — B02.9 HERPES ZOSTER WITHOUT COMPLICATION: ICD-10-CM

## 2020-06-28 PROCEDURE — 700102 HCHG RX REV CODE 250 W/ 637 OVERRIDE(OP): Performed by: EMERGENCY MEDICINE

## 2020-06-28 PROCEDURE — A9270 NON-COVERED ITEM OR SERVICE: HCPCS | Performed by: EMERGENCY MEDICINE

## 2020-06-28 PROCEDURE — 99283 EMERGENCY DEPT VISIT LOW MDM: CPT

## 2020-06-28 RX ORDER — VALACYCLOVIR HYDROCHLORIDE 1 G/1
1000 TABLET, FILM COATED ORAL DAILY
Qty: 3 TAB | Refills: 0 | Status: SHIPPED | OUTPATIENT
Start: 2020-06-28 | End: 2020-07-01

## 2020-06-28 RX ORDER — VALACYCLOVIR HYDROCHLORIDE 500 MG/1
1000 TABLET, FILM COATED ORAL ONCE
Status: COMPLETED | OUTPATIENT
Start: 2020-06-28 | End: 2020-06-28

## 2020-06-28 RX ADMIN — VALACYCLOVIR HYDROCHLORIDE 1000 MG: 500 TABLET, FILM COATED ORAL at 21:59

## 2020-06-28 ASSESSMENT — FIBROSIS 4 INDEX: FIB4 SCORE: 1.91

## 2020-06-29 NOTE — ED TRIAGE NOTES
"Pt with hx of lupus on dialysis for same to er for red/itchy closed, non blistered area on left flank of unknown origin \"since last night. No treatment to same provided prior to er visit, in no apparent distress in triage, denies recent travel or known covid exposure  "

## 2020-06-29 NOTE — ED PROVIDER NOTES
"ED Provider Note    CHIEF COMPLAINT   Chief Complaint   Patient presents with   • Rash     left flank x 24 hrs       HPI   Lily Nicole is a 24 y.o. female who presents with a itchy dysthetic rash on her left flank for the last 2 days.  The rash wraps onto the abdomen.  No history of contact dermatitis or allergies to soaps lotions or detergents.  No poison oak exposure.  History of lupus on immunosuppressants with end-stage renal disease on hemodialysis.  No history of prior strep or staph skin infections.    REVIEW OF SYSTEMS   Pertinent positives include: Pruritic, dysthetic left flank and abdominal rash.  Pertinent negatives include: Fever, flulike symptoms, nausea, body aches.     PAST MEDICAL HISTORY   Past Medical History:   Diagnosis Date   • Anemia 01/17/2018   • AVF (arteriovenous fistula) ([facility])     Right Arm   • Dialysis patient ([facility])      dialysis, M,W,F Elizabeth/Joni   • ESRD (end stage renal disease) on dialysis ([facility]) 01/17/2018    Twan Fu   • Heart burn    • Hypertension 01/17/2018    \"Controlled with medication\"   • Indigestion    • Lupus ([facility])    • Migraines 01/17/2018   • Seizure ([facility]) 2013    from high blood pressure, reports 1 time event       CURRENT MEDICATIONS   •  valacyclovir (VALTREX) 1 GM Tab, Take 1 Tab by mouth every day., Disp: 3 Tab, Rfl: 0  •  prochlorperazine (COMPAZINE) 10 MG Tab, Take 1 Tab by mouth every 6 hours as needed for Nausea/Vomiting (headache)., Disp: 30 Tab, Rfl: 3  •  ondansetron (ZOFRAN ODT) 4 MG TABLET DISPERSIBLE, Take 1 Tab by mouth every four hours as needed for Nausea (give PO if no IV route available)., Disp: 10 Tab, Rfl: 0  •  pantoprazole (PROTONIX) 40 MG Tablet Delayed Response, Take 1 Tab by mouth 2 times a day. After completed resume previous dose of Prilosec OTC, Disp: 30 Tab, Rfl: 0  •  atenolol (TENORMIN) 25 MG Tab, Take 1 tablet by mouth once daily, Disp: 90 Tab, Rfl: 0  •  amLODIPine (NORVASC) 10 MG Tab, Take 10 mg by mouth every evening., Disp: " ", Rfl:   •  hydroxychloroquine (PLAQUENIL) 200 MG Tab, Take 200 mg by mouth every evening., Disp: , Rfl:   •  predniSONE (DELTASONE) 5 MG Tab, Take 5 mg by mouth every evening., Disp: , Rfl:   •  mycophenolate sodium (MYFORTIC) 360 MG Tablet Delayed Response tablet, Take 360 mg by mouth every evening., Disp: , Rfl:       ALLERGIES   Allergies   Allergen Reactions   • Clindamycin Rash     Hive   • Keflex Rash     Hives   • Metoprolol Nausea       PHYSICAL EXAM  VITAL SIGNS: /107   Pulse 98   Temp 36.3 °C (97.4 °F) (Temporal)   Resp 18   Ht 1.778 m (5' 10\")   Wt 68.4 kg (150 lb 12.7 oz)   SpO2 100%   Breastfeeding No   BMI 21.64 kg/m²   Constitutional: Well developed, Well nourished, appearing, hypertensive.   HENT: Facial acne rash.  Respiratory: Rate and excursion normal.  Musculoskeletal: Deformities.  Skin: Patchy left flank to midline abdomen rash without blistering.     COURSE & MEDICAL DECISION MAKING  Appearing immunocompromise patient presents with probable zoster.  There is no evidence of cellulitis.  Contact dermatitis is less likely.    PLAN:   Valacyclovir after dialysis, first dose given here.  Shingles handout  Your doctor if not better 5 to 6 days  Medrol for itching Tylenol for pain  Your doctor or return for fever, pus or signs of bacterial infection      CONDITION: good    FINAL IMPRESSION  1. Herpes zoster without complication            Electronically signed by: Renan Perales M.D., 6/28/2020 9:52 PM    "

## 2020-06-30 ENCOUNTER — HOSPITAL ENCOUNTER (EMERGENCY)
Facility: MEDICAL CENTER | Age: 25
End: 2020-06-30
Attending: EMERGENCY MEDICINE
Payer: COMMERCIAL

## 2020-06-30 VITALS
HEIGHT: 67 IN | SYSTOLIC BLOOD PRESSURE: 160 MMHG | HEART RATE: 93 BPM | RESPIRATION RATE: 18 BRPM | BODY MASS INDEX: 23.53 KG/M2 | WEIGHT: 149.91 LBS | TEMPERATURE: 97 F | DIASTOLIC BLOOD PRESSURE: 108 MMHG | OXYGEN SATURATION: 99 %

## 2020-06-30 DIAGNOSIS — L29.9 PRURITUS: ICD-10-CM

## 2020-06-30 DIAGNOSIS — B02.9 HERPES ZOSTER WITHOUT COMPLICATION: ICD-10-CM

## 2020-06-30 PROCEDURE — 96372 THER/PROPH/DIAG INJ SC/IM: CPT

## 2020-06-30 PROCEDURE — 99283 EMERGENCY DEPT VISIT LOW MDM: CPT

## 2020-06-30 PROCEDURE — A9270 NON-COVERED ITEM OR SERVICE: HCPCS | Performed by: EMERGENCY MEDICINE

## 2020-06-30 PROCEDURE — 700111 HCHG RX REV CODE 636 W/ 250 OVERRIDE (IP): Performed by: EMERGENCY MEDICINE

## 2020-06-30 PROCEDURE — 700102 HCHG RX REV CODE 250 W/ 637 OVERRIDE(OP): Performed by: EMERGENCY MEDICINE

## 2020-06-30 RX ORDER — DIPHENHYDRAMINE HYDROCHLORIDE 50 MG/ML
25 INJECTION INTRAMUSCULAR; INTRAVENOUS ONCE
Status: COMPLETED | OUTPATIENT
Start: 2020-06-30 | End: 2020-06-30

## 2020-06-30 RX ORDER — METHYLPREDNISOLONE 4 MG/1
TABLET ORAL
Qty: 1 PACKAGE | Refills: 0 | Status: SHIPPED | OUTPATIENT
Start: 2020-06-30 | End: 2020-07-01

## 2020-06-30 RX ORDER — HYDROCODONE BITARTRATE AND ACETAMINOPHEN 5; 325 MG/1; MG/1
1 TABLET ORAL ONCE
Status: COMPLETED | OUTPATIENT
Start: 2020-06-30 | End: 2020-06-30

## 2020-06-30 RX ORDER — HYDROXYZINE PAMOATE 25 MG/1
25 CAPSULE ORAL 3 TIMES DAILY PRN
Qty: 15 CAP | Refills: 0 | Status: SHIPPED | OUTPATIENT
Start: 2020-06-30 | End: 2020-07-12

## 2020-06-30 RX ADMIN — DIPHENHYDRAMINE HYDROCHLORIDE 25 MG: 50 INJECTION, SOLUTION INTRAMUSCULAR; INTRAVENOUS at 01:02

## 2020-06-30 RX ADMIN — HYDROCODONE BITARTRATE AND ACETAMINOPHEN 1 TABLET: 5; 325 TABLET ORAL at 01:03

## 2020-06-30 ASSESSMENT — FIBROSIS 4 INDEX: FIB4 SCORE: 1.91

## 2020-06-30 NOTE — ED TRIAGE NOTES
Was seen here yesterday for the same  Shingle out break  Was told to come back if worsening symptoms   Rash spreading and increased itchy and pain

## 2020-06-30 NOTE — ED PROVIDER NOTES
ED Provider Note    ED Provider Note    Scribed for Allison Cormier MD by Allison Cormier M.D.. 6/30/2020, 12:59 AM.    Primary care provider: Ella Matthew M.D.  Means of arrival: Private  History obtained from: Patient and mother  History limited by: None    CHIEF COMPLAINT  Chief Complaint   Patient presents with   • Herpes Zoster       HPI  Lily Nicole is a 24 y.o. female who presents to the Emergency Department for evaluation of painful and itchy rash.  This is localized to left flank with radiation toward midline of the left lower abdomen.  Patient has an unfortunate history of lupus as well as end-stage renal disease on dialysis.  This is the third time she is had shingles, this was diagnosed on the 28th.  Patient relates rash initially was on the left flank on the 28th and now is extended to the midline of the abdomen, she was told to return if it got worse.  She has been taking her valacyclovir after dialysis as directed, dose reduced from twice a day to once a day given end-stage renal disease on dialysis.  Patient notes the pain and itching are worsening as well.  No fever, no weeping or drainage from the skin.  No injury, no nausea, no vomiting.  She is been using Tylenol at home with minimal improvement, Benadryl at home with minimal improvement.    REVIEW OF SYSTEMS  Pertinent positives include pruritus and pain. Pertinent negatives include no fever, no vomiting, no discharge from the rash.      PAST MEDICAL HISTORY   has a past medical history of Anemia (01/17/2018), AVF (arteriovenous fistula) ([facility]), Dialysis patient ([facility]), ESRD (end stage renal disease) on dialysis ([facility]) (01/17/2018), Heart burn, Hypertension (01/17/2018), Indigestion, Lupus ([facility]), Migraines (01/17/2018), and Seizure ([facility]) (2013).    SURGICAL HISTORY   has a past surgical history that includes ronak by laparoscopy (4/5/2010); av fistula creation (Right); angioplasty (01/17/2018); other; other;  "gastroscopy-endo (12/9/2019); and gastroscopy (N/A, 5/30/2020).    SOCIAL HISTORY  Social History     Tobacco Use   • Smoking status: Never Smoker   • Smokeless tobacco: Never Used   Substance Use Topics   • Alcohol use: No   • Drug use: No      Social History     Substance and Sexual Activity   Drug Use No       FAMILY HISTORY  Family History   Problem Relation Age of Onset   • Diabetes Paternal Grandmother        CURRENT MEDICATIONS  Home Medications     Reviewed by Tara Tai R.N. (Registered Nurse) on 06/30/20 at 0046  Med List Status: <None>   Medication Last Dose Status   amLODIPine (NORVASC) 10 MG Tab  Active   atenolol (TENORMIN) 25 MG Tab  Active   hydroxychloroquine (PLAQUENIL) 200 MG Tab  Active   mycophenolate sodium (MYFORTIC) 360 MG Tablet Delayed Response tablet  Active   ondansetron (ZOFRAN ODT) 4 MG TABLET DISPERSIBLE  Active   pantoprazole (PROTONIX) 40 MG Tablet Delayed Response  Active   predniSONE (DELTASONE) 5 MG Tab  Active   prochlorperazine (COMPAZINE) 10 MG Tab  Active   valacyclovir (VALTREX) 1 GM Tab  Active                ALLERGIES  Allergies   Allergen Reactions   • Clindamycin Rash     Hive   • Keflex Rash     Hives   • Metoprolol Nausea       PHYSICAL EXAM  VITAL SIGNS: /108   Pulse 93   Temp 36.1 °C (97 °F) (Temporal)   Resp 18   Ht 1.702 m (5' 7\")   Wt 68 kg (149 lb 14.6 oz)   LMP 06/29/2020   SpO2 99%   BMI 23.48 kg/m²     General: Alert, no acute distress  Skin: Warm, dry, normal for ethnicity.  Vesicular, raised, tender, erythematous confluence from the left flank extending to the skin overlying the suprapubic region of the lower abdomen, tender to touch, not markedly excoriated.  No fluctuance, no crepitus.  Head: Normocephalic, atraumatic  Neck: Trachea midline, no tenderness  Eye: PERRL, normal conjunctiva  Cardiovascular: Normal peripheral perfusion  Respiratory: respirations are non-labored  Musculoskeletal: No swelling, no deformity  Neurological: " "Alert and oriented to person, place, time, and situation  Lymphatics: No lymphadenopathy  Psychiatric: Cooperative, appropriate mood & affect          COURSE & MEDICAL DECISION MAKING  Pertinent Labs & Imaging studies reviewed. (See chart for details)    0048 AM - Patient seen and examined at bedside. Patient will be treated with diphenhydramine intramuscular as well as hydrocodone.  I spoke with pharmacy who confirms she is on the correct dose of valacyclovir for a an end-stage renal disease patient on dialysis. The differential diagnoses include but are not limited to: Shingles    Patient Vitals for the past 24 hrs:   BP Temp Temp src Pulse Resp SpO2 Height Weight   06/30/20 0031 160/108 36.1 °C (97 °F) Temporal 93 18 99 % 1.702 m (5' 7\") 68 kg (149 lb 14.6 oz)         Decision Making:  This is a 24 y.o. year old female who presents with painful itchy rash consistent with shingles.  This is unfortunately the third time she has had this.  Patient is concerned because the rash is worsening and also is concerned about the dose of the antiviral.  I confirm she is on the appropriate dose, rash does not extend beyond the midline, no evidence of cellulitis nor other superinfection.  Will continue valacyclovir.  I think is reasonable to write her for steroids and antihistamines for symptomatic relief.  Patient has been given intramuscular Benadryl given no improvement with oral Benadryl taken at home here in the ED.  No evidence of septicemia/viremia, no indication for inpatient management.    The patient will return for new or worsening symptoms and is stable at the time of discharge.    Patient has had high blood pressure while in the emergency department, felt likely secondary to medical condition. Counseled patient to monitor blood pressure at home and follow up with primary care physician.     DISPOSITION:  Patient will be discharged home in stable condition.    FOLLOW UP:  Ella Matthew M.D.  [address]  [place] [id number]  [phone]    Schedule an appointment as soon as possible for a visit in 3 days        OUTPATIENT MEDICATIONS:  New Prescriptions    HYDROXYZINE PAMOATE (VISTARIL) 25 MG CAP    Take 1 Cap by mouth 3 times a day as needed for Itching.    METHYLPREDNISOLONE (MEDROL DOSEPAK) 4 MG TABLET THERAPY PACK    Use as directed         FINAL IMPRESSION  1. Herpes zoster without complication    2. Pruritus          IAllison M.D. (Gabrielle), am scribing for, and in the presence of, Allison Cormier MD.    Electronically signed by: Allison Cormier M.D. (Gabrielle), 6/30/2020    IAllison MD personally performed the services described in this documentation, as scribed by Allison Cormier M.D. in my presence, and it is both accurate and complete    The note accurately reflects work and decisions made by me.  Allison Cormier M.D.  6/30/2020  1:25 AM

## 2020-07-01 ENCOUNTER — HOSPITAL ENCOUNTER (OUTPATIENT)
Facility: MEDICAL CENTER | Age: 25
End: 2020-07-02
Attending: EMERGENCY MEDICINE | Admitting: INTERNAL MEDICINE
Payer: COMMERCIAL

## 2020-07-01 ENCOUNTER — APPOINTMENT (OUTPATIENT)
Dept: CARDIOLOGY | Facility: MEDICAL CENTER | Age: 25
End: 2020-07-01
Attending: INTERNAL MEDICINE
Payer: COMMERCIAL

## 2020-07-01 DIAGNOSIS — D64.9 ANEMIA, UNSPECIFIED TYPE: ICD-10-CM

## 2020-07-01 DIAGNOSIS — R53.1 WEAKNESS: ICD-10-CM

## 2020-07-01 DIAGNOSIS — R79.89 ELEVATED TROPONIN: ICD-10-CM

## 2020-07-01 DIAGNOSIS — E87.1 HYPONATREMIA: ICD-10-CM

## 2020-07-01 PROBLEM — B02.9 SHINGLES: Status: ACTIVE | Noted: 2020-07-01

## 2020-07-01 PROBLEM — D61.818 PANCYTOPENIA (HCC): Status: ACTIVE | Noted: 2020-07-01

## 2020-07-01 LAB
ANION GAP SERPL CALC-SCNC: 12 MMOL/L (ref 7–16)
APPEARANCE UR: CLEAR
BACTERIA #/AREA URNS HPF: NEGATIVE /HPF
BASOPHILS # BLD AUTO: 0.8 % (ref 0–1.8)
BASOPHILS # BLD: 0.03 K/UL (ref 0–0.12)
BILIRUB UR QL STRIP.AUTO: NEGATIVE
BUN SERPL-MCNC: 48 MG/DL (ref 8–22)
CALCIUM SERPL-MCNC: 9.3 MG/DL (ref 8.4–10.2)
CHLORIDE SERPL-SCNC: 81 MMOL/L (ref 96–112)
CO2 SERPL-SCNC: 27 MMOL/L (ref 20–33)
COLOR UR: YELLOW
COMMENT 1642: NORMAL
CREAT SERPL-MCNC: 6.69 MG/DL (ref 0.5–1.4)
EKG IMPRESSION: NORMAL
EOSINOPHIL # BLD AUTO: 0.01 K/UL (ref 0–0.51)
EOSINOPHIL NFR BLD: 0.3 % (ref 0–6.9)
EPI CELLS #/AREA URNS HPF: ABNORMAL /HPF
ERYTHROCYTE [DISTWIDTH] IN BLOOD BY AUTOMATED COUNT: 56.5 FL (ref 35.9–50)
GLUCOSE SERPL-MCNC: 102 MG/DL (ref 65–99)
GLUCOSE UR STRIP.AUTO-MCNC: 100 MG/DL
HCG SERPL QL: NEGATIVE
HCT VFR BLD AUTO: 26.2 % (ref 37–47)
HGB BLD-MCNC: 7.7 G/DL (ref 12–16)
HYPOCHROMIA BLD QL SMEAR: ABNORMAL
IMM GRANULOCYTES # BLD AUTO: 0.02 K/UL (ref 0–0.11)
IMM GRANULOCYTES NFR BLD AUTO: 0.5 % (ref 0–0.9)
KETONES UR STRIP.AUTO-MCNC: NEGATIVE MG/DL
LEUKOCYTE ESTERASE UR QL STRIP.AUTO: NEGATIVE
LYMPHOCYTES # BLD AUTO: 0.5 K/UL (ref 1–4.8)
LYMPHOCYTES NFR BLD: 12.7 % (ref 22–41)
MCH RBC QN AUTO: 27.3 PG (ref 27–33)
MCHC RBC AUTO-ENTMCNC: 29.4 G/DL (ref 33.6–35)
MCV RBC AUTO: 92.9 FL (ref 81.4–97.8)
MICRO URNS: ABNORMAL
MONOCYTES # BLD AUTO: 0.23 K/UL (ref 0–0.85)
MONOCYTES NFR BLD AUTO: 5.9 % (ref 0–13.4)
NEUTROPHILS # BLD AUTO: 3.14 K/UL (ref 2–7.15)
NEUTROPHILS NFR BLD: 79.8 % (ref 44–72)
NITRITE UR QL STRIP.AUTO: NEGATIVE
NRBC # BLD AUTO: 0 K/UL
NRBC BLD-RTO: 0 /100 WBC
PH UR STRIP.AUTO: 8.5 [PH] (ref 5–8)
PLATELET # BLD AUTO: 116 K/UL (ref 164–446)
PLATELET BLD QL SMEAR: NORMAL
PMV BLD AUTO: 10.1 FL (ref 9–12.9)
POLYCHROMASIA BLD QL SMEAR: NORMAL
POTASSIUM SERPL-SCNC: 5.9 MMOL/L (ref 3.6–5.5)
PROT UR QL STRIP: 30 MG/DL
RBC # BLD AUTO: 2.82 M/UL (ref 4.2–5.4)
RBC # URNS HPF: ABNORMAL /HPF
RBC BLD AUTO: PRESENT
RBC UR QL AUTO: ABNORMAL
SODIUM SERPL-SCNC: 120 MMOL/L (ref 135–145)
SP GR UR STRIP.AUTO: 1.01
TRANS CELLS URNS QL MICRO: ABNORMAL /HPF
TROPONIN T SERPL-MCNC: 694 NG/L (ref 6–19)
TROPONIN T SERPL-MCNC: 805 NG/L (ref 6–19)
TROPONIN T SERPL-MCNC: 849 NG/L (ref 6–19)
WBC # BLD AUTO: 3.9 K/UL (ref 4.8–10.8)
WBC #/AREA URNS HPF: ABNORMAL /HPF

## 2020-07-01 PROCEDURE — G0378 HOSPITAL OBSERVATION PER HR: HCPCS

## 2020-07-01 PROCEDURE — 700111 HCHG RX REV CODE 636 W/ 250 OVERRIDE (IP): Performed by: INTERNAL MEDICINE

## 2020-07-01 PROCEDURE — A9270 NON-COVERED ITEM OR SERVICE: HCPCS | Performed by: EMERGENCY MEDICINE

## 2020-07-01 PROCEDURE — 99244 OFF/OP CNSLTJ NEW/EST MOD 40: CPT | Performed by: INTERNAL MEDICINE

## 2020-07-01 PROCEDURE — 700102 HCHG RX REV CODE 250 W/ 637 OVERRIDE(OP): Performed by: EMERGENCY MEDICINE

## 2020-07-01 PROCEDURE — 700101 HCHG RX REV CODE 250: Performed by: INTERNAL MEDICINE

## 2020-07-01 PROCEDURE — 700102 HCHG RX REV CODE 250 W/ 637 OVERRIDE(OP): Performed by: INTERNAL MEDICINE

## 2020-07-01 PROCEDURE — 85025 COMPLETE CBC W/AUTO DIFF WBC: CPT

## 2020-07-01 PROCEDURE — 84703 CHORIONIC GONADOTROPIN ASSAY: CPT

## 2020-07-01 PROCEDURE — 80048 BASIC METABOLIC PNL TOTAL CA: CPT

## 2020-07-01 PROCEDURE — A9270 NON-COVERED ITEM OR SERVICE: HCPCS | Performed by: INTERNAL MEDICINE

## 2020-07-01 PROCEDURE — 96374 THER/PROPH/DIAG INJ IV PUSH: CPT

## 2020-07-01 PROCEDURE — 96376 TX/PRO/DX INJ SAME DRUG ADON: CPT

## 2020-07-01 PROCEDURE — 90935 HEMODIALYSIS ONE EVALUATION: CPT

## 2020-07-01 PROCEDURE — 96375 TX/PRO/DX INJ NEW DRUG ADDON: CPT

## 2020-07-01 PROCEDURE — 36415 COLL VENOUS BLD VENIPUNCTURE: CPT

## 2020-07-01 PROCEDURE — 93005 ELECTROCARDIOGRAM TRACING: CPT | Performed by: EMERGENCY MEDICINE

## 2020-07-01 PROCEDURE — 99220 PR INITIAL OBSERVATION CARE,LEVL III: CPT | Performed by: INTERNAL MEDICINE

## 2020-07-01 PROCEDURE — 81001 URINALYSIS AUTO W/SCOPE: CPT

## 2020-07-01 PROCEDURE — 84484 ASSAY OF TROPONIN QUANT: CPT

## 2020-07-01 PROCEDURE — 99291 CRITICAL CARE FIRST HOUR: CPT

## 2020-07-01 RX ORDER — ASPIRIN 81 MG/1
324 TABLET, CHEWABLE ORAL ONCE
Status: COMPLETED | OUTPATIENT
Start: 2020-07-01 | End: 2020-07-01

## 2020-07-01 RX ORDER — MYCOPHENOLIC ACID 360 MG/1
180 TABLET, DELAYED RELEASE ORAL EVERY EVENING
Status: DISCONTINUED | OUTPATIENT
Start: 2020-07-02 | End: 2020-07-02 | Stop reason: HOSPADM

## 2020-07-01 RX ORDER — PROCHLORPERAZINE EDISYLATE 5 MG/ML
5-10 INJECTION INTRAMUSCULAR; INTRAVENOUS EVERY 4 HOURS PRN
Status: DISCONTINUED | OUTPATIENT
Start: 2020-07-01 | End: 2020-07-02 | Stop reason: HOSPADM

## 2020-07-01 RX ORDER — AMLODIPINE BESYLATE 5 MG/1
10 TABLET ORAL EVERY EVENING
Status: DISCONTINUED | OUTPATIENT
Start: 2020-07-01 | End: 2020-07-02 | Stop reason: HOSPADM

## 2020-07-01 RX ORDER — SODIUM CHLORIDE 9 MG/ML
250 INJECTION, SOLUTION INTRAVENOUS
Status: DISCONTINUED | OUTPATIENT
Start: 2020-07-01 | End: 2020-07-02 | Stop reason: HOSPADM

## 2020-07-01 RX ORDER — OMEPRAZOLE 20 MG/1
20 CAPSULE, DELAYED RELEASE ORAL EVERY EVENING
Status: ON HOLD | COMMUNITY
End: 2020-09-19 | Stop reason: SDUPTHER

## 2020-07-01 RX ORDER — HYDROXYZINE HYDROCHLORIDE 25 MG/1
25 TABLET, FILM COATED ORAL 3 TIMES DAILY PRN
Status: DISCONTINUED | OUTPATIENT
Start: 2020-07-01 | End: 2020-07-02 | Stop reason: HOSPADM

## 2020-07-01 RX ORDER — POLYETHYLENE GLYCOL 3350 17 G/17G
1 POWDER, FOR SOLUTION ORAL
Status: DISCONTINUED | OUTPATIENT
Start: 2020-07-01 | End: 2020-07-02 | Stop reason: HOSPADM

## 2020-07-01 RX ORDER — LORAZEPAM 2 MG/ML
0.5 INJECTION INTRAMUSCULAR EVERY 4 HOURS PRN
Status: DISCONTINUED | OUTPATIENT
Start: 2020-07-01 | End: 2020-07-02 | Stop reason: HOSPADM

## 2020-07-01 RX ORDER — ACETAMINOPHEN 500 MG
500 TABLET ORAL EVERY 6 HOURS PRN
COMMUNITY
End: 2021-04-07

## 2020-07-01 RX ORDER — ONDANSETRON 4 MG/1
4 TABLET, ORALLY DISINTEGRATING ORAL EVERY 4 HOURS PRN
Status: DISCONTINUED | OUTPATIENT
Start: 2020-07-01 | End: 2020-07-02 | Stop reason: HOSPADM

## 2020-07-01 RX ORDER — CLONIDINE HYDROCHLORIDE 0.1 MG/1
0.1 TABLET ORAL EVERY 6 HOURS PRN
Status: DISCONTINUED | OUTPATIENT
Start: 2020-07-01 | End: 2020-07-02 | Stop reason: HOSPADM

## 2020-07-01 RX ORDER — ATENOLOL 25 MG/1
25 TABLET ORAL EVERY EVENING
COMMUNITY
End: 2020-08-25

## 2020-07-01 RX ORDER — VALACYCLOVIR HYDROCHLORIDE 500 MG/1
1000 TABLET, FILM COATED ORAL DAILY
Status: DISCONTINUED | OUTPATIENT
Start: 2020-07-01 | End: 2020-07-01

## 2020-07-01 RX ORDER — PREDNISONE 5 MG/1
5 TABLET ORAL EVERY EVENING
Status: DISCONTINUED | OUTPATIENT
Start: 2020-07-01 | End: 2020-07-02 | Stop reason: HOSPADM

## 2020-07-01 RX ORDER — PROMETHAZINE HYDROCHLORIDE 25 MG/1
12.5-25 SUPPOSITORY RECTAL EVERY 4 HOURS PRN
Status: DISCONTINUED | OUTPATIENT
Start: 2020-07-01 | End: 2020-07-02 | Stop reason: HOSPADM

## 2020-07-01 RX ORDER — PROMETHAZINE HYDROCHLORIDE 25 MG/1
12.5-25 TABLET ORAL EVERY 4 HOURS PRN
Status: DISCONTINUED | OUTPATIENT
Start: 2020-07-01 | End: 2020-07-02 | Stop reason: HOSPADM

## 2020-07-01 RX ORDER — MYCOPHENOLIC ACID 360 MG/1
360 TABLET, DELAYED RELEASE ORAL EVERY EVENING
Status: DISCONTINUED | OUTPATIENT
Start: 2020-07-01 | End: 2020-07-01

## 2020-07-01 RX ORDER — ATENOLOL 25 MG/1
25 TABLET ORAL EVERY EVENING
Status: DISCONTINUED | OUTPATIENT
Start: 2020-07-01 | End: 2020-07-02 | Stop reason: HOSPADM

## 2020-07-01 RX ORDER — ACETAMINOPHEN 325 MG/1
650 TABLET ORAL EVERY 6 HOURS PRN
Status: DISCONTINUED | OUTPATIENT
Start: 2020-07-01 | End: 2020-07-02 | Stop reason: HOSPADM

## 2020-07-01 RX ORDER — HYDROXYCHLOROQUINE SULFATE 200 MG/1
200 TABLET, FILM COATED ORAL EVERY EVENING
Status: DISCONTINUED | OUTPATIENT
Start: 2020-07-01 | End: 2020-07-02 | Stop reason: HOSPADM

## 2020-07-01 RX ORDER — BISACODYL 10 MG
10 SUPPOSITORY, RECTAL RECTAL
Status: DISCONTINUED | OUTPATIENT
Start: 2020-07-01 | End: 2020-07-02 | Stop reason: HOSPADM

## 2020-07-01 RX ORDER — AMOXICILLIN 250 MG
2 CAPSULE ORAL 2 TIMES DAILY
Status: DISCONTINUED | OUTPATIENT
Start: 2020-07-01 | End: 2020-07-02 | Stop reason: HOSPADM

## 2020-07-01 RX ORDER — HYDROXYZINE PAMOATE 25 MG/1
25 CAPSULE ORAL 3 TIMES DAILY PRN
Status: DISCONTINUED | OUTPATIENT
Start: 2020-07-01 | End: 2020-07-01

## 2020-07-01 RX ORDER — OMEPRAZOLE 20 MG/1
20 CAPSULE, DELAYED RELEASE ORAL DAILY
Status: DISCONTINUED | OUTPATIENT
Start: 2020-07-01 | End: 2020-07-02 | Stop reason: HOSPADM

## 2020-07-01 RX ORDER — VALACYCLOVIR HYDROCHLORIDE 1 G/1
1000 TABLET, FILM COATED ORAL 2 TIMES DAILY
Status: ON HOLD | COMMUNITY
Start: 2020-06-29 | End: 2020-07-02

## 2020-07-01 RX ORDER — ALBUMIN (HUMAN) 12.5 G/50ML
12.5 SOLUTION INTRAVENOUS
Status: DISCONTINUED | OUTPATIENT
Start: 2020-07-01 | End: 2020-07-02 | Stop reason: HOSPADM

## 2020-07-01 RX ORDER — ONDANSETRON 2 MG/ML
4 INJECTION INTRAMUSCULAR; INTRAVENOUS EVERY 4 HOURS PRN
Status: DISCONTINUED | OUTPATIENT
Start: 2020-07-01 | End: 2020-07-02 | Stop reason: HOSPADM

## 2020-07-01 RX ADMIN — ATENOLOL 25 MG: 25 TABLET ORAL at 17:50

## 2020-07-01 RX ADMIN — CLONIDINE HYDROCHLORIDE 0.1 MG: 0.1 TABLET ORAL at 11:31

## 2020-07-01 RX ADMIN — ACETAMINOPHEN 650 MG: 325 TABLET, FILM COATED ORAL at 11:31

## 2020-07-01 RX ADMIN — ASPIRIN 81 MG CHEWABLE TABLET 324 MG: 81 TABLET CHEWABLE at 03:48

## 2020-07-01 RX ADMIN — OMEPRAZOLE 20 MG: 20 CAPSULE, DELAYED RELEASE ORAL at 05:48

## 2020-07-01 RX ADMIN — EPOETIN ALFA-EPBX 10000 UNITS: 10000 INJECTION, SOLUTION INTRAVENOUS; SUBCUTANEOUS at 14:20

## 2020-07-01 RX ADMIN — ONDANSETRON 4 MG: 2 INJECTION INTRAMUSCULAR; INTRAVENOUS at 09:18

## 2020-07-01 RX ADMIN — AMLODIPINE BESYLATE 10 MG: 5 TABLET ORAL at 17:50

## 2020-07-01 RX ADMIN — VALACYCLOVIR HYDROCHLORIDE 1000 MG: 500 TABLET, FILM COATED ORAL at 05:48

## 2020-07-01 RX ADMIN — EPOETIN ALFA-EPBX 2000 UNITS: 2000 INJECTION, SOLUTION INTRAVENOUS; SUBCUTANEOUS at 14:20

## 2020-07-01 RX ADMIN — PREDNISONE 5 MG: 5 TABLET ORAL at 17:51

## 2020-07-01 RX ADMIN — MYCOPHENILIC ACID 360 MG: 360 TABLET, DELAYED RELEASE ORAL at 17:50

## 2020-07-01 RX ADMIN — HYDROXYCHLOROQUINE SULFATE 200 MG: 200 TABLET, FILM COATED ORAL at 17:50

## 2020-07-01 RX ADMIN — HYDROXYZINE HYDROCHLORIDE 25 MG: 25 TABLET, FILM COATED ORAL at 19:56

## 2020-07-01 RX ADMIN — LORAZEPAM 0.5 MG: 2 INJECTION INTRAMUSCULAR; INTRAVENOUS at 11:31

## 2020-07-01 ASSESSMENT — LIFESTYLE VARIABLES
TOTAL SCORE: 0
TOTAL SCORE: 0
EVER FELT BAD OR GUILTY ABOUT YOUR DRINKING: NO
HOW MANY TIMES IN THE PAST YEAR HAVE YOU HAD 5 OR MORE DRINKS IN A DAY: 0
TOTAL SCORE: 0
ALCOHOL_USE: NO
AVERAGE NUMBER OF DAYS PER WEEK YOU HAVE A DRINK CONTAINING ALCOHOL: 0
CONSUMPTION TOTAL: NEGATIVE
ON A TYPICAL DAY WHEN YOU DRINK ALCOHOL HOW MANY DRINKS DO YOU HAVE: 0
HAVE PEOPLE ANNOYED YOU BY CRITICIZING YOUR DRINKING: NO
EVER HAD A DRINK FIRST THING IN THE MORNING TO STEADY YOUR NERVES TO GET RID OF A HANGOVER: NO
HAVE YOU EVER FELT YOU SHOULD CUT DOWN ON YOUR DRINKING: NO

## 2020-07-01 ASSESSMENT — ENCOUNTER SYMPTOMS
PALPITATIONS: 0
NAUSEA: 1
SHORTNESS OF BREATH: 0
DEPRESSION: 0
WEAKNESS: 0
SPUTUM PRODUCTION: 0
DIARRHEA: 0
FALLS: 0
STRIDOR: 0
VOMITING: 0
FEVER: 0
COUGH: 0
DIZZINESS: 1
LOSS OF CONSCIOUSNESS: 0
HEADACHES: 0
CONSTIPATION: 0
TINGLING: 0
CHILLS: 0
ABDOMINAL PAIN: 0
MYALGIAS: 0

## 2020-07-01 ASSESSMENT — COGNITIVE AND FUNCTIONAL STATUS - GENERAL
DAILY ACTIVITIY SCORE: 24
MOBILITY SCORE: 24
SUGGESTED CMS G CODE MODIFIER DAILY ACTIVITY: CH
SUGGESTED CMS G CODE MODIFIER MOBILITY: CH

## 2020-07-01 ASSESSMENT — FIBROSIS 4 INDEX
FIB4 SCORE: 1.72
FIB4 SCORE: 1.91

## 2020-07-01 NOTE — ASSESSMENT & PLAN NOTE
-Asymptomatic as far as no chest pain or shortness of breath  -I did personally review her EKG, she does have some borderline ST depressions in some of the precordial leads  -Continue to trend troponin, monitor on telemetry and repeat EKG  -Obtain echocardiogram  -Similar episode happened in January elevated troponin, echocardiogram at that time was normal

## 2020-07-01 NOTE — PROGRESS NOTES
Please see the H&P Dictated After Midnight for full details    Interval Progress Note:  Patient seen and examined    24-year-old-year-old complicated past medical history including SLE complicated by end-stage renal disease on dialysis Monday Wednesday Friday, immunosuppression, history of shingles, presents with anxiety and nausea, found to have fluid overload, elevated troponin, hyperkalemia and a recurrent shingles rash.    She did not tolerate steroids due to extreme anxiety.  She has had symptoms for greater than 72 hours therefore medical treatment for shingles is unlikely to be beneficial and she is already shown significant improvement in her rash.    DC valacyclovir  Avoid steroids    She does not have any chest pain however troponin is elevated.  I suspect this is partially related to reduced renal clearance however it did rise greater than her baseline of 400, up to 800.  It is trending down.  No EKG changes.  Will dialyze today.  Recommend outpatient cardiology follow-up for routine risk factor stratification, possible stress test if indicated    Blood pressures uncontrolled, likely due to fluid overload, needing dialysis.  As needed clonidine.    She is anxious, add IV Ativan 0.5 every 4 as needed    Demetra Dior D.O.

## 2020-07-01 NOTE — PROGRESS NOTES
Tech from Lab called result to Skinny MARTINEZ with critical result of Troponin 697 at 1045. Critical lab result read back to tech.   Dr. Dior notified of critical lab result at 1050.  Critical lab result read back by Dr. Dior.

## 2020-07-01 NOTE — PROGRESS NOTES
Hemodialysis done today, started @ 1259 and ended @ 1600 with net UF= 2000ml. Report given to MICHELLE Hawley. See flow sheet for details

## 2020-07-01 NOTE — PROGRESS NOTES
Attending Hospitalist today is Dr. Dior starting at 0700. Please call this Physician for orders, updates and questions.   Renetta Jernigan RN  Hospitalist Service

## 2020-07-01 NOTE — ED PROVIDER NOTES
ED Provider Note        CHIEF COMPLAINT  Chief Complaint   Patient presents with   • Blood in Urine   • N/V       HPI  Lily Nicole is a 24 y.o. female who presents to the Emergency Department with generally feeling slightly weak as well as having some blood in her urine.  The patient says she has been feeling weak for many days.  She denies any nausea or vomiting.  She was seen yesterday and was diagnosed with herpes zoster and given medication for this.  She denies any chest pain or shortness of breath.  She says some the symptoms are more chronic however the blood in her urine made her concerned that is why she is here today.    REVIEW OF SYSTEMS  See HPI for further details. All other systems are negative.     PAST MEDICAL HISTORY   has a past medical history of Anemia (01/17/2018), AVF (arteriovenous fistula) ([facility]), Dialysis patient ([facility]), ESRD (end stage renal disease) on dialysis ([facility]) (01/17/2018), Heart burn, Hypertension (01/17/2018), Indigestion, Lupus ([facility]), Migraines (01/17/2018), and Seizure ([facility]) (2013).    SURGICAL HISTORY   has a past surgical history that includes ronak by laparoscopy (4/5/2010); av fistula creation (Right); angioplasty (01/17/2018); other; other; gastroscopy-endo (12/9/2019); and gastroscopy (N/A, 5/30/2020).    SOCIAL HISTORY  Social History     Tobacco Use   • Smoking status: Never Smoker   • Smokeless tobacco: Never Used   Substance Use Topics   • Alcohol use: No   • Drug use: No      Social History     Substance and Sexual Activity   Drug Use No       FAMILY HISTORY  Family History   Problem Relation Age of Onset   • Diabetes Paternal Grandmother        CURRENT MEDICATIONS  Home Medications     Reviewed by Milind Lorenzo R.N. (Registered Nurse) on 07/01/20 at 0247  Med List Status: <None>   Medication Last Dose Status   amLODIPine (NORVASC) 10 MG Tab  Active   atenolol (TENORMIN) 25 MG Tab  Active   hydroxychloroquine (PLAQUENIL) 200 MG Tab  Active   hydrOXYzine  "pamoate (VISTARIL) 25 MG Cap  Active   methylPREDNISolone (MEDROL DOSEPAK) 4 MG Tablet Therapy Pack  Active   mycophenolate sodium (MYFORTIC) 360 MG Tablet Delayed Response tablet  Active   ondansetron (ZOFRAN ODT) 4 MG TABLET DISPERSIBLE  Active   pantoprazole (PROTONIX) 40 MG Tablet Delayed Response  Active   predniSONE (DELTASONE) 5 MG Tab  Active   prochlorperazine (COMPAZINE) 10 MG Tab  Active   valacyclovir (VALTREX) 1 GM Tab  Active                ALLERGIES  Allergies   Allergen Reactions   • Clindamycin Rash     Hive   • Keflex Rash     Hives   • Metoprolol Nausea       PHYSICAL EXAM  VITAL SIGNS: /91   Pulse 88   Temp 36.2 °C (97.1 °F) (Temporal)   Resp 20   Ht 1.702 m (5' 7\")   Wt 66.9 kg (147 lb 7.8 oz)   LMP 06/29/2020   SpO2 99%   BMI 23.10 kg/m²      Constitutional: Alert in no apparent distress.  HENT: No signs of trauma, Bilateral external ears normal, Nose normal.   Eyes: Pupils are equal and reactive, Conjunctiva normal, Non-icteric.   Neck: Normal range of motion, No tenderness, Supple, No stridor.   Lymphatic: No lymphadenopathy noted.   Cardiovascular: Regular rate and rhythm, nondisplaced PMI  Thorax & Lungs: No respiratory distress,  No chest tenderness.   Abdomen: Bowel sounds normal, Soft, No tenderness, No masses, No pulsatile masses. No peritoneal signs.  Skin: Warm, Dry, No erythema, No rash.   Back: No bony tenderness, No CVA tenderness.   Extremities: Intact distal pulses, No edema, No tenderness, No cyanosis.  Musculoskeletal: Good range of motion in all major joints. No tenderness to palpation or major deformities noted.   Neurologic: Alert , Normal motor function, Normal sensory function, No focal deficits noted.   Psychiatric: Affect normal, Judgment normal, Mood normal.     DIAGNOSTIC STUDIES / PROCEDURES    EKG Interpretation:  Interpreted by me  ST depression noted in leads V4, V5 and V6, normal sinus rhythm with no T wave inversion       LABS  Labs Reviewed   BASIC " METABOLIC PANEL - Abnormal; Notable for the following components:       Result Value    Sodium 120 (*)     Potassium 5.9 (*)     Chloride 81 (*)     Glucose 102 (*)     Bun 48 (*)     Creatinine 6.69 (*)     All other components within normal limits   CBC WITH DIFFERENTIAL - Abnormal; Notable for the following components:    WBC 3.9 (*)     RBC 2.82 (*)     Hemoglobin 7.7 (*)     Hematocrit 26.2 (*)     MCHC 29.4 (*)     RDW 56.5 (*)     Platelet Count 116 (*)     Neutrophils-Polys 79.80 (*)     Lymphocytes 12.70 (*)     Lymphs (Absolute) 0.50 (*)     All other components within normal limits   ESTIMATED GFR - Abnormal; Notable for the following components:    GFR If  9 (*)     GFR If Non  8 (*)     All other components within normal limits   TROPONIN - Abnormal; Notable for the following components:    Troponin T 849 (*)     All other components within normal limits   HCG QUAL SERUM   PLATELET ESTIMATE   MORPHOLOGY   DIFFERENTIAL COMMENT   URINALYSIS       All labs reviewed by me.        COURSE & MEDICAL DECISION MAKING  Pertinent Labs & Imaging studies reviewed. (See chart for details)    This is a 24 y.o. female that presents with generalized weakness as well as hematuria.  I will evaluate the patient for pregnancy as well as anemia and evaluate her urine for infection.  We will also evaluate for electrolyte derangements and then reassess.  I will get an EKG as well.    2:21 AM - Patient seen and examined at bedside.       Patient was found to have ST depressions on the EKG.  A troponin was done and was significantly elevated at 849.  The patient was given aspirin.  Patient has a hemoglobin of 7.7 which is baseline for her.  She is also found to have a sodium of 120 as well as a potassium of 5.9.  Creatinine is significantly elevated.  At this time I will admit the patient for her elevated troponin as well as weakness.  She will be admitted in guarded condition.    The total  critical care time on this patient is 35 minutes, resuscitating patient, speaking with admitting physician, and deciphering test results. This 35 minutes is exclusive of separately billable procedures.      FINAL IMPRESSION  1. Anemia, unspecified type    2. Weakness    3. Elevated troponin              Electronically signed by: Rivera Fink M.D., 7/1/2020

## 2020-07-01 NOTE — ED NOTES
Med rec updated and complete  Allergies reviewed  Pt reports no vitamins   Pt reports no antibiotics in the last 2 weeks

## 2020-07-01 NOTE — PROGRESS NOTES
Pt admitted to  317 from ER via gurney.  A&O x4, tearful.  Up to amb to bed with sba, steady on feet. Pt oriented to , bed controls, call light.  Fall precautions in effect.  Assessment completed, see flowsheet.  Pt c/o gen pain, anxiety, mild dizziness with ambulation, states nausea better controlled after meds in ED. poc reviewed with pt, pt vu, all questions answered.  Tele = sr-st, vss    Dr Dior at bs.

## 2020-07-01 NOTE — H&P
Hospital Medicine History & Physical Note    Date of Service  7/1/2020    Primary Care Physician  Ella Matthew M.D.    Code Status  Full Code    Chief Complaint  Chief Complaint   Patient presents with   • Blood in Urine   • N/V       History of Presenting Illness  24 y.o. female who presented 7/1/2020 with weakness and dizziness.  Patient was in the ER yesterday, diagnosed with shingles and started on medications.  Patient states today whenever she took methylprednisolone she began having dizziness and nausea immediately after and then developed weakness.  She states prior to that she was in her usual state of health other than the rash.  She denies any chest pain, shortness of breath.  She also denied any fever, chills or palpitations.  Because of the sensation, she presented to the emergency department.  Upon arrival, she had a troponin that was significantly elevated, there was borderline ST depressions noted on EKG.  Patient does have lupus, causing end-stage renal disease, gets hemodialysis Monday, Wednesday and Friday.  States she has been going to dialysis.  In January she did have elevated troponin around 400, now at 849.  She continues to be asymptomatic.  I did discuss the case including labs and imaging with the ER physician.    Review of Systems  Review of Systems   Constitutional: Positive for malaise/fatigue. Negative for chills and fever.   HENT: Negative for congestion.    Respiratory: Negative for cough, sputum production, shortness of breath and stridor.    Cardiovascular: Negative for chest pain, palpitations and leg swelling.   Gastrointestinal: Positive for nausea. Negative for abdominal pain, constipation, diarrhea and vomiting.   Genitourinary: Negative for dysuria and urgency.   Musculoskeletal: Negative for falls and myalgias.   Skin: Positive for rash.   Neurological: Positive for dizziness. Negative for tingling, loss of consciousness, weakness and headaches.    Psychiatric/Behavioral: Negative for depression and suicidal ideas.   All other systems reviewed and are negative.      Past Medical History   has a past medical history of Anemia (01/17/2018), AVF (arteriovenous fistula) ([facility]), Dialysis patient ([facility]), ESRD (end stage renal disease) on dialysis ([facility]) (01/17/2018), Heart burn, Hypertension (01/17/2018), Indigestion, Lupus ([facility]), Migraines (01/17/2018), and Seizure ([facility]) (2013).    Surgical History   has a past surgical history that includes ronak by laparoscopy (4/5/2010); av fistula creation (Right); angioplasty (01/17/2018); other; other; gastroscopy-endo (12/9/2019); and gastroscopy (N/A, 5/30/2020).     Family History  family history includes Diabetes in her paternal grandmother.     Social History   reports that she has never smoked. She has never used smokeless tobacco. She reports that she does not drink alcohol or use drugs.    Allergies  Allergies   Allergen Reactions   • Clindamycin Rash     Hive   • Keflex Rash     Hives   • Methylprednisolone      Anxious;   • Metoprolol Nausea       Medications  Prior to Admission Medications   Prescriptions Last Dose Informant Patient Reported? Taking?   amLODIPine (NORVASC) 10 MG Tab 6/30/2020 at 2100 Patient Yes No   Sig: Take 10 mg by mouth every evening.   atenolol (TENORMIN) 25 MG Tab 6/30/2020 at 2100 Patient No No   Sig: Take 1 tablet by mouth once daily   hydrOXYzine pamoate (VISTARIL) 25 MG Cap 6/30/2020 at 2100  No No   Sig: Take 1 Cap by mouth 3 times a day as needed for Itching.   hydroxychloroquine (PLAQUENIL) 200 MG Tab 6/30/2020 at 2100 Patient Yes No   Sig: Take 200 mg by mouth every evening.   mycophenolate sodium (MYFORTIC) 360 MG Tablet Delayed Response tablet 6/30/2020 at 2100 Patient Yes No   Sig: Take 360 mg by mouth every evening.   omeprazole (PRILOSEC) 20 MG delayed-release capsule 6/30/2020 at 2100  Yes Yes   Sig: Take 20 mg by mouth every day.   ondansetron (ZOFRAN ODT) 4 MG TABLET  DISPERSIBLE 6/27/2020 at UNKNOWN  No No   Sig: Take 1 Tab by mouth every four hours as needed for Nausea (give PO if no IV route available).   predniSONE (DELTASONE) 5 MG Tab 6/30/2020 at 2100 Patient Yes No   Sig: Take 5 mg by mouth every evening.   valacyclovir (VALTREX) 1 GM Tab 6/30/2020 at 1200  No No   Sig: Take 1 Tab by mouth every day.      Facility-Administered Medications: None       Physical Exam  Temp:  [36.2 °C (97.1 °F)] 36.2 °C (97.1 °F)  Pulse:  [80-92] 88  Resp:  [14-20] 20  BP: (143-176)/() 143/91  SpO2:  [93 %-99 %] 99 %    Physical Exam  Vitals signs and nursing note reviewed.   Constitutional:       General: She is not in acute distress.     Appearance: She is well-developed. She is not diaphoretic.   HENT:      Head: Normocephalic and atraumatic.      Right Ear: External ear normal.      Left Ear: External ear normal.      Nose: Nose normal. No congestion or rhinorrhea.      Mouth/Throat:      Mouth: Mucous membranes are moist.      Pharynx: No oropharyngeal exudate.   Eyes:      General:         Right eye: No discharge.         Left eye: No discharge.      Extraocular Movements: Extraocular movements intact.   Neck:      Musculoskeletal: Normal range of motion and neck supple.      Trachea: No tracheal deviation.   Cardiovascular:      Rate and Rhythm: Normal rate and regular rhythm.      Heart sounds: No murmur. No friction rub. No gallop.    Pulmonary:      Effort: Pulmonary effort is normal. No respiratory distress.      Breath sounds: Normal breath sounds. No stridor. No wheezing or rales.   Chest:      Chest wall: No tenderness.   Abdominal:      General: Bowel sounds are normal. There is no distension.      Palpations: Abdomen is soft.      Tenderness: There is no abdominal tenderness.   Musculoskeletal: Normal range of motion.         General: No tenderness.      Right lower leg: No edema.      Left lower leg: No edema.   Lymphadenopathy:      Cervical: No cervical adenopathy.    Skin:     General: Skin is warm and dry.      Coloration: Skin is not jaundiced.      Findings: Rash (left trunk ) present.   Neurological:      General: No focal deficit present.      Mental Status: She is alert and oriented to person, place, and time.      Cranial Nerves: No cranial nerve deficit.   Psychiatric:         Mood and Affect: Mood normal.         Behavior: Behavior normal.         Thought Content: Thought content normal.         Judgment: Judgment normal.         Laboratory:  Recent Labs     07/01/20  0230   WBC 3.9*   RBC 2.82*   HEMOGLOBIN 7.7*   HEMATOCRIT 26.2*   MCV 92.9   MCH 27.3   MCHC 29.4*   RDW 56.5*   PLATELETCT 116*   MPV 10.1     Recent Labs     07/01/20  0230   SODIUM 120*   POTASSIUM 5.9*   CHLORIDE 81*   CO2 27   GLUCOSE 102*   BUN 48*   CREATININE 6.69*   CALCIUM 9.3     Recent Labs     07/01/20  0230   GLUCOSE 102*         No results for input(s): NTPROBNP in the last 72 hours.      Recent Labs     07/01/20  0307   TROPONINT 849*       Imaging:  No orders to display         Assessment/Plan:    Elevated troponin- (present on admission)  Assessment & Plan  -Asymptomatic as far as no chest pain or shortness of breath  -I did personally review her EKG, she does have some borderline ST depressions in some of the precordial leads  -Continue to trend troponin, monitor on telemetry and repeat EKG  -Obtain echocardiogram  -Similar episode happened in January elevated troponin, echocardiogram at that time was normal    Shingles- (present on admission)  Assessment & Plan  -Continue valacyclovir    ESRD (end stage renal disease) on dialysis (HCC)- (present on admission)  Assessment & Plan  -Patient does get hemodialysis Monday, Wednesday and Friday, now has hyperkalemia  -She will need nephrology consultation for hemodialysis in the morning  -She does not need urgent hemodialysis, I will treat with insulin, there are no changes on telemetry    Pancytopenia (HCC)- (present on  admission)  Assessment & Plan  -Chronic, no sign of gross bleeding  -Repeat CBC in the morning    Hyperkalemia- (present on admission)  Assessment & Plan  -Give 1 amp of D50 followed by 10 units of IV insulin  -Repeat BMP later this morning    Hypertension- (present on admission)  Assessment & Plan  -Continue home amlodipine and atenolol  -Start PRN clonidine  -Adjust as needed

## 2020-07-01 NOTE — CARE PLAN
Problem: Pain Management  Goal: Pain level will decrease to patient's comfort goal  Outcome: PROGRESSING AS EXPECTED  Intervention: Educate and implement non-pharmacologic comfort measures. Examples: relaxation, distration, play therapy, activity therapy, massage, etc.  Note: Pt encouraged to call with any pain management concerns.  See mar for medication management.      Problem: Safety  Goal: Will remain free from falls  Outcome: PROGRESSING AS EXPECTED  Intervention: Assess risk factors for falls  Flowsheets (Taken 7/1/2020 1407 by Krysten Paige R.N.)  History of fall: 0  Note:  Pt up self, steady on feet, c/o mild dizziness at times, encouraged to call for asst oob/chair.      Problem: Psychosocial Needs:  Goal: Level of anxiety will decrease  Outcome: PROGRESSING SLOWER THAN EXPECTED  Intervention: Identify and develop with patient strategies to cope with anxiety triggers  Note: Pt tearful and anxious.  Collaborate with pt and MD for relief of anxiety.

## 2020-07-01 NOTE — ED NOTES
Pt medicated for nausea    Pt resting on gurney, pt in no acute distress, pt provided call light, instructed to call if needing any assistance, instructed not to get up by self, gurney in lowest position.

## 2020-07-01 NOTE — ASSESSMENT & PLAN NOTE
-Patient does get hemodialysis Monday, Wednesday and Friday, now has hyperkalemia  -She will need nephrology consultation for hemodialysis in the morning  -She does not need urgent hemodialysis, I will treat with insulin, there are no changes on telemetry

## 2020-07-02 ENCOUNTER — APPOINTMENT (OUTPATIENT)
Dept: CARDIOLOGY | Facility: MEDICAL CENTER | Age: 25
End: 2020-07-02
Attending: INTERNAL MEDICINE
Payer: COMMERCIAL

## 2020-07-02 VITALS
BODY MASS INDEX: 22.49 KG/M2 | DIASTOLIC BLOOD PRESSURE: 101 MMHG | HEIGHT: 67 IN | RESPIRATION RATE: 16 BRPM | HEART RATE: 83 BPM | WEIGHT: 143.3 LBS | OXYGEN SATURATION: 93 % | SYSTOLIC BLOOD PRESSURE: 157 MMHG | TEMPERATURE: 97.8 F

## 2020-07-02 LAB
ABO GROUP BLD: ABNORMAL
ANION GAP SERPL CALC-SCNC: 12 MMOL/L (ref 7–16)
BARCODED ABORH UBTYP: 5100
BARCODED PRD CODE UBPRD: ABNORMAL
BARCODED UNIT NUM UBUNT: ABNORMAL
BLD GP AB INVEST PLASRBC-IMP: ABNORMAL
BLD GP AB SCN SERPL QL: ABNORMAL
BUN SERPL-MCNC: 22 MG/DL (ref 8–22)
C3 SERPL-MCNC: 86.2 MG/DL (ref 87–200)
C4 SERPL-MCNC: 12.9 MG/DL (ref 19–52)
CALCIUM SERPL-MCNC: 8.5 MG/DL (ref 8.4–10.2)
CHLORIDE SERPL-SCNC: 90 MMOL/L (ref 96–112)
CO2 SERPL-SCNC: 27 MMOL/L (ref 20–33)
COMPONENT R 8504R: ABNORMAL
CREAT SERPL-MCNC: 4.47 MG/DL (ref 0.5–1.4)
ERYTHROCYTE [DISTWIDTH] IN BLOOD BY AUTOMATED COUNT: 57 FL (ref 35.9–50)
GLUCOSE SERPL-MCNC: 92 MG/DL (ref 65–99)
HCT VFR BLD AUTO: 21.2 % (ref 37–47)
HGB BLD-MCNC: 6.3 G/DL (ref 12–16)
LV EJECT FRACT  99904: 60
LV EJECT FRACT MOD 4C 99902: 53.73
MCH RBC QN AUTO: 28 PG (ref 27–33)
MCHC RBC AUTO-ENTMCNC: 29.7 G/DL (ref 33.6–35)
MCV RBC AUTO: 94.2 FL (ref 81.4–97.8)
PLATELET # BLD AUTO: 131 K/UL (ref 164–446)
PMV BLD AUTO: 9.9 FL (ref 9–12.9)
POTASSIUM SERPL-SCNC: 4.5 MMOL/L (ref 3.6–5.5)
PRODUCT TYPE UPROD: ABNORMAL
RBC # BLD AUTO: 2.25 M/UL (ref 4.2–5.4)
RH BLD: ABNORMAL
SODIUM SERPL-SCNC: 129 MMOL/L (ref 135–145)
UNIT STATUS USTAT: ABNORMAL
WBC # BLD AUTO: 5.9 K/UL (ref 4.8–10.8)

## 2020-07-02 PROCEDURE — 80048 BASIC METABOLIC PNL TOTAL CA: CPT

## 2020-07-02 PROCEDURE — 86900 BLOOD TYPING SEROLOGIC ABO: CPT

## 2020-07-02 PROCEDURE — 36430 TRANSFUSION BLD/BLD COMPNT: CPT

## 2020-07-02 PROCEDURE — 86870 RBC ANTIBODY IDENTIFICATION: CPT

## 2020-07-02 PROCEDURE — 93308 TTE F-UP OR LMTD: CPT

## 2020-07-02 PROCEDURE — 93325 DOPPLER ECHO COLOR FLOW MAPG: CPT | Mod: 26 | Performed by: INTERNAL MEDICINE

## 2020-07-02 PROCEDURE — 86850 RBC ANTIBODY SCREEN: CPT

## 2020-07-02 PROCEDURE — 86225 DNA ANTIBODY NATIVE: CPT

## 2020-07-02 PROCEDURE — 86922 COMPATIBILITY TEST ANTIGLOB: CPT

## 2020-07-02 PROCEDURE — 86160 COMPLEMENT ANTIGEN: CPT | Mod: 91

## 2020-07-02 PROCEDURE — 86901 BLOOD TYPING SEROLOGIC RH(D): CPT

## 2020-07-02 PROCEDURE — 85027 COMPLETE CBC AUTOMATED: CPT

## 2020-07-02 PROCEDURE — G0378 HOSPITAL OBSERVATION PER HR: HCPCS

## 2020-07-02 PROCEDURE — 96375 TX/PRO/DX INJ NEW DRUG ADDON: CPT

## 2020-07-02 PROCEDURE — 99217 PR OBSERVATION CARE DISCHARGE: CPT | Performed by: INTERNAL MEDICINE

## 2020-07-02 PROCEDURE — 700111 HCHG RX REV CODE 636 W/ 250 OVERRIDE (IP): Performed by: INTERNAL MEDICINE

## 2020-07-02 PROCEDURE — 93308 TTE F-UP OR LMTD: CPT | Mod: 26 | Performed by: INTERNAL MEDICINE

## 2020-07-02 PROCEDURE — A9270 NON-COVERED ITEM OR SERVICE: HCPCS | Performed by: INTERNAL MEDICINE

## 2020-07-02 PROCEDURE — 700102 HCHG RX REV CODE 250 W/ 637 OVERRIDE(OP): Performed by: INTERNAL MEDICINE

## 2020-07-02 PROCEDURE — P9016 RBC LEUKOCYTES REDUCED: HCPCS

## 2020-07-02 RX ORDER — DIPHENHYDRAMINE HYDROCHLORIDE 50 MG/ML
25 INJECTION INTRAMUSCULAR; INTRAVENOUS
Status: COMPLETED | OUTPATIENT
Start: 2020-07-02 | End: 2020-07-02

## 2020-07-02 RX ORDER — MYCOPHENOLIC ACID 180 MG/1
180 TABLET, DELAYED RELEASE ORAL EVERY EVENING
Qty: 30 TAB | Refills: 1 | Status: SHIPPED | OUTPATIENT
Start: 2020-07-02 | End: 2021-10-31

## 2020-07-02 RX ADMIN — ACETAMINOPHEN 650 MG: 325 TABLET, FILM COATED ORAL at 08:26

## 2020-07-02 RX ADMIN — OMEPRAZOLE 20 MG: 20 CAPSULE, DELAYED RELEASE ORAL at 05:51

## 2020-07-02 RX ADMIN — DIPHENHYDRAMINE HYDROCHLORIDE 25 MG: 50 INJECTION, SOLUTION INTRAMUSCULAR; INTRAVENOUS at 08:27

## 2020-07-02 ASSESSMENT — FIBROSIS 4 INDEX: FIB4 SCORE: 1.52

## 2020-07-02 NOTE — PROGRESS NOTES
Hgb 6.3 this am. Notified Dr. Styles. Received orders to transfuse 1 unit of PRBCs. Called lab to run COD.

## 2020-07-02 NOTE — PROGRESS NOTES
Discharge order written.IV removed and tolerated well. Tele removed and returned to monitor tech. Belongings gathered. Pt states that all personal belongings are in possession. AVS printed, reviewed and copy signed and placed on the chart. Patient has no further questions.  Prescriptions sent to home pharmacy. Discharged in satisfactory condition home with mother. Pt off unit via wheelchair, escorted by YA Nam.     Per Dr Matthew's office they will review chart and then contact patient to determine if follow-up is needed in person or virtual. Hospitalist RN to help with follow-up for Rheumatology.

## 2020-07-02 NOTE — CONSULTS
"Nephrology Consultation    Date of Service  7/1/2020    Referring Physician  Demetra Dior D.O.    Consulting Physician  Navin Metz M.D.    Reason for Consultation  Management of ESRD on HD      History of Presenting Illness  24 y.o. female with SLE, ESRD on HD MWF who presented 7/1/2020 with hematuria.    The patient came yesterday to the emergency room with concern for abdominal rash.  She was diagnosed as having shingles, and started on medicine.  The patient had 1 episode of hematuria this morning, and was worried that her blood counts might of dropped, so came to the emergency room.  The patient denies chest pain, nausea, vomiting, headache, shortness of breath.  Patient had dialysis this afternoon, with 2 L removed, tolerated well.    The patient says she has been on dialysis for about 5 years.  She says her kidney failure is from lupus nephritis.  She is oliguric.  She has a right upper arm AV fistula, which works well for her.  The patient is on transplant list at [facility], but currently on hold, due to outstanding tests such as Pap smear.    Review of Systems  Review of Systems   Constitutional: Negative for fever.   Respiratory: Negative for shortness of breath.    Cardiovascular: Negative for chest pain.   Gastrointestinal: Negative for abdominal pain.   All other systems reviewed and are negative.      Past Medical History  Past Medical History:   Diagnosis Date   • Anemia 01/17/2018   • AVF (arteriovenous fistula) (Formerly [facility])     Right Arm   • Dialysis patient (Formerly [facility])      dialysis, M,W,F Elizabeth/Joni   • ESRD (end stage renal disease) on dialysis (Formerly [facility]) 01/17/2018    Twan Fu   • Heart burn    • Hypertension 01/17/2018    \"Controlled with medication\"   • Indigestion    • Lupus (Formerly [facility])    • Migraines 01/17/2018   • Seizure (Formerly [facility]) 2013    from high blood pressure, reports 1 time event       Surgical History  Past Surgical History:   Procedure Laterality Date   • GASTROSCOPY N/A 5/30/2020    Procedure: " "GASTROSCOPY;  Surgeon: Waylon Mcqueen M.D.;  Location: SURGERY [facility];  Service: Gastroenterology   • GASTROSCOPY-ENDO  12/9/2019    Procedure: GASTROSCOPY;  Surgeon: Aaron Kam M.D.;  Location: SURGERY [facility];  Service: Gastroenterology   • ANGIOPLASTY  01/17/2018    \"Right Arm AV-Fistulagram & Angioplastyx3\"   • ULI BY LAPAROSCOPY  4/5/2010    Performed by SYED MARTELL at SURGERY [facility]   • AV FISTULA CREATION Right    • OTHER      renal biopsy x 3   • OTHER      bone marrow biopsy       Family History  Family History   Problem Relation Age of Onset   • Diabetes Paternal Grandmother        Social History  Social History     Socioeconomic History   • Marital status: Single     Spouse name: Not on file   • Number of children: Not on file   • Years of education: Not on file   • Highest education level: Not on file   Occupational History   • Not on file   Social Needs   • Financial resource strain: Not on file   • Food insecurity     Worry: Never true     Inability: Never true   • Transportation needs     Medical: No     Non-medical: No   Tobacco Use   • Smoking status: Never Smoker   • Smokeless tobacco: Never Used   Substance and Sexual Activity   • Alcohol use: No   • Drug use: No   • Sexual activity: Not on file   Lifestyle   • Physical activity     Days per week: Not on file     Minutes per session: Not on file   • Stress: Not on file   Relationships   • Social connections     Talks on phone: Not on file     Gets together: Not on file     Attends [employer] service: Not on file     Active member of club or organization: Not on file     Attends meetings of clubs or organizations: Not on file     Relationship status: Not on file   • Intimate partner violence     Fear of current or ex partner: Not on file     Emotionally abused: Not on file     Physically abused: Not on file     Forced sexual activity: Not on file   Other Topics Concern   • Not on file   Social " History Narrative   • Not on file       Medications  Current Facility-Administered Medications   Medication Dose Route Frequency Provider Last Rate Last Dose   • amLODIPine (NORVASC) tablet 10 mg  10 mg Oral Q EVENING ZULEIKA HeartO.   10 mg at 07/01/20 1750   • atenolol (TENORMIN) tablet 25 mg  25 mg Oral Q EVENING ZULEIKA HeartO.   25 mg at 07/01/20 1750   • hydroxychloroquine (Plaquenil) tablet 200 mg  200 mg Oral Q EVENING ROGE Heart.O.   200 mg at 07/01/20 1750   • mycophenolate sodium (MYFORTIC) tablet 360 mg  360 mg Oral Q EVENING ZULEIKA HeartO.   360 mg at 07/01/20 1750   • omeprazole (PRILOSEC) capsule 20 mg  20 mg Oral DAILY ZULEIKA HeartO.   20 mg at 07/01/20 0548   • predniSONE (DELTASONE) tablet 5 mg  5 mg Oral Q EVENING Jorge Styles D.O.       • senna-docusate (PERICOLACE or SENOKOT S) 8.6-50 MG per tablet 2 Tab  2 Tab Oral BID Jorge Styles D.O.   Stopped at 07/01/20 0600    And   • polyethylene glycol/lytes (MIRALAX) PACKET 1 Packet  1 Packet Oral QDAY PRN Jorge Styles D.O.        And   • magnesium hydroxide (MILK OF MAGNESIA) suspension 30 mL  30 mL Oral QDAY PRN Jorge Styles D.O.        And   • bisacodyl (DULCOLAX) suppository 10 mg  10 mg Rectal QDAY PRN Jorge Styles D.O.       • acetaminophen (TYLENOL) tablet 650 mg  650 mg Oral Q6HRS PRN ZULEIKA HeartO.   650 mg at 07/01/20 1131   • cloNIDine (CATAPRES) tablet 0.1 mg  0.1 mg Oral Q6HRS PRN ZULEIKA Heart[facility].   0.1 mg at 07/01/20 1131   • ondansetron (ZOFRAN) syringe/vial injection 4 mg  4 mg Intravenous Q4HRS PRN ZULEIKA HeartO.   4 mg at 07/01/20 0918   • ondansetron (ZOFRAN ODT) dispertab 4 mg  4 mg Oral Q4HRS PRN Jorge Styles D.O.       • promethazine (PHENERGAN) tablet 12.5-25 mg  12.5-25 mg Oral Q4HRS PRN Jorge Styles D.O.       • promethazine (PHENERGAN) suppository 12.5-25 mg  12.5-25 mg Rectal Q4HRS PRN Jorge Styles D.O.       • prochlorperazine (COMPAZINE)  injection 5-10 mg  5-10 mg Intravenous Q4HRS PRN Jorge Styles D.O.       • hydrOXYzine HCl (ATARAX) tablet 25 mg  25 mg Oral TID PRN Jorge Styles D.O.       • NS (BOLUS) infusion 250 mL  250 mL Intravenous DIALYSIS PRN Navin Metz M.D.       • albumin human 25% solution 12.5 g  12.5 g Intravenous DIALYSIS PRN Navin Metz M.D.       • lidocaine (XYLOCAINE) 1 % injection 1 mL  1 mL Intradermal PRN Navin Metz M.D.       • LORazepam (ATIVAN) injection 0.5 mg  0.5 mg Intravenous Q4HRS PRN Demetra Dior D.O.   0.5 mg at 07/01/20 1131   • epoetin (Retacrit) injection (Dialysis use only) 10,000 Units  10,000 Units Intravenous MO, WE + FR ROGE Holliday.O.   10,000 Units at 07/01/20 1420   • epoetin (Retacrit) injection (Dialysis Use Only) 2,000 Units  2,000 Units Intravenous MO, WE + FR ROGE Holliday.O.   2,000 Units at 07/01/20 1420       Allergies  Allergies   Allergen Reactions   • Clindamycin Rash     Hive   • Keflex Rash     Hives   • Methylprednisolone      Anxious;   • Metoprolol Nausea       Physical Exam  Temp:  [36.2 °C (97.1 °F)-36.6 °C (97.8 °F)] 36.6 °C (97.8 °F)  Pulse:  [] 101  Resp:  [] 12  BP: (124-176)/() 137/92  SpO2:  [91 %-99 %] 91 %    Physical Exam   Constitutional: She is oriented to person, place, and time. She appears well-developed. No distress.   HENT:   Mouth/Throat: Oropharynx is clear and moist. No oropharyngeal exudate.   Eyes: EOM are normal. No scleral icterus.   Neck: No tracheal deviation present.   Cardiovascular: Normal heart sounds.   No murmur heard.  Pulmonary/Chest: Effort normal. No stridor. She has no rales.   Abdominal: Soft. There is no abdominal tenderness.   Musculoskeletal: Normal range of motion.         General: No edema.   Neurological: She is alert and oriented to person, place, and time.   Skin: Skin is warm and dry. Rash (very faint left abdominal wall rash in dermatomal distribution) noted.   Psychiatric: She has a normal mood  and affect.   Access: Right brachiocephalic AV fistula, with faint bruit and thrill.      Fluids  Date 07/01/20 0700 - 07/02/20 0659   Shift 7067-5031 8874-3300 3607-3466 24 Hour Total   INTAKE   P.O. 300   300   Dialysis  500  500   Shift Total 300 500  800   OUTPUT   Dialysis  2500  2500   Shift Total  2500  2500   Weight (kg) 64 64 64 64       Laboratory  Labs reviewed, pertinent labs below.  Recent Labs     07/01/20  0230   WBC 3.9*   RBC 2.82*   HEMOGLOBIN 7.7*   HEMATOCRIT 26.2*   MCV 92.9   MCH 27.3   MCHC 29.4*   RDW 56.5*   PLATELETCT 116*   MPV 10.1     Recent Labs     07/01/20  0230   SODIUM 120*   POTASSIUM 5.9*   CHLORIDE 81*   CO2 27   GLUCOSE 102*   BUN 48*   CREATININE 6.69*   CALCIUM 9.3                URINALYSIS:  Lab Results   Component Value Date/Time    COLORURINE Yellow 07/01/2020 0355    CLARITY Clear 07/01/2020 0355    SPECGRAVITY 1.010 07/01/2020 0355    PHURINE 8.5 (A) 07/01/2020 0355    KETONES Negative 07/01/2020 0355    PROTEINURIN 30 (A) 07/01/2020 0355    BILIRUBINUR Negative 07/01/2020 0355    UROBILU 0.2 08/10/2017 1009    NITRITE Negative 07/01/2020 0355    LEUKESTERAS Negative 07/01/2020 0355    OCCULTBLOOD Small (A) 07/01/2020 0355     UPC  Lab Results   Component Value Date/Time    TOTPROTUR See Note 02/17/2011 1745    TOTPROTUR 1123.0 (H) 02/17/2011 1745      Lab Results   Component Value Date/Time    CREATININEU 105.83 02/17/2011 1745       Imaging reviewed  EC-ECHOCARDIOGRAM LTD W/O CONT    (Results Pending)         Assessment/Plan  24 y.o. female with SLE, ESRD on HD MWF who presented 7/1/2020 with hematuria.    1.  ESRD on hemodialysis Monday Wednesday Friday.  Oliguric.  Dialysis today per usual schedule.  Avoid nephrotoxins.  Check labs daily.    2.  Access: Right brachiocephalic AV fistula.  Patent.  Right arm precautions.    3.  History of lupus nephritis.  Currently on Myfortic 360 mg p.o. daily.  Patient currently with pancytopenia, unclear if this is a flare of her  lupus.  Recommend check complement C3, complement C4, double-stranded DNA titer.  Unfortunately, given pancytopenia, would recommend decreasing Myfortic to 180 mg p.o. daily.  Continue prednisone.    4.  Pancytopenia, noted on admission.  Unclear etiology.  Decrease Myfortic as above.  Continue outpatient Epogen.    5.  Anemia of chronic disease, due to kidney failure.  Continue outpatient dose Epogen 3 times weekly with dialysis.  Check CBC daily.    6.  Hyponatremia, noted on admission.  In this patient with oliguric kidney failure, this is most likely due to excess volume intake.  Limit hypotonic fluids.  Check labs daily.    7.  Hyperkalemia, mild, noted on admission.  This should improve with dialysis.  Low potassium diet.  Check labs daily.    8.  Hypertension, uncontrolled on admission.  This should improve with dialysis.  Continue ultrafiltration with dialysis as tolerated.    Following.  Okay to discharge from nephrology standpoint. There is no need for outpatient nephrology clinic follow up appointment, as the patient will be seen by a nephrologist at their outpatient dialysis center.     Navin Metz MD  Nephrology

## 2020-07-02 NOTE — DISCHARGE SUMMARY
Discharge Summary    CHIEF COMPLAINT ON ADMISSION  Chief Complaint   Patient presents with   • Blood in Urine   • N/V       Reason for Admission  Dizziness; Forgetfulness; Nausea; *     Admission Date  7/1/2020    CODE STATUS  Full Code    HPI & HOSPITAL COURSE  This is a 24-year-old-year-old female with a history of SLE complicated by end-stage renal disease on dialysis Monday Wednesday Friday, immunosuppression, history of shingles, who presented with anxiety and nausea, found to have fluid overload, elevated troponin, hyperkalemia and a recurrent shingles rash.    For the shingles she had previously been started on outpatient Valtrex and prednisone however these are making her extremely anxious.  Her rash had Naresh shown significant improvement therefore Valtrex and prednisone were both discontinued.  She was noted to have a mildly elevated troponin near 800.  Her baseline, due to poor renal clearance is usually around 400.  Her troponin was trended and it consistently trended down.  She did not experience chest pain and had no significant changes on her EKG.  For her anxiety she was treated with IV Ativan as needed.  Nephrology was consulted and they performed dialysis for hyperkalemia and her potassium subsequently normalized     Recommend outpatient cardiology follow-up for routine risk factor stratification, possible stress test if indicated     Blood pressure was uncontrolled, likely due to fluid overload, as it improved after dialysis.    Nephrology reduced her Myfortic dose from 3  daily and this will be her new dose on discharge.    The following morning, she was noted to have a drop in her hemoglobin down to 6.4 therefore she was transfused 1 unit of blood.  This may have created some demand explaining her mild rise in troponin.    She will follow-up with rheumatology as her complement levels remain low.  She will follow-up with nephrology as an outpatient and continue dialysis Monday Wednesday  "Friday.  She will follow-up with her PCP       Therefore, she is discharged in fair and stable condition to home with close outpatient follow-up.    The patient recovered much more quickly than anticipated on admission.    Discharge Date  7/2/2020    FOLLOW UP ITEMS POST DISCHARGE  Follow-up with nephrology Monday Wednesday Friday for HD  Follow-up with rheumatology as scheduled    DISCHARGE DIAGNOSES  Active Problems:    Elevated troponin POA: Yes    ESRD (end stage renal disease) on dialysis (HCC) POA: Yes    Shingles POA: Yes    Hypertension POA: Yes      Overview: \"Controlled with medication\"    Hyperkalemia POA: Yes    Pancytopenia (HCC) POA: Yes  Resolved Problems:    * No resolved hospital problems. *      FOLLOW UP  Ella Matthew M.D.  [address]  [place] [address]  [phone]      Call to schedule a hospital follow-up appointment.    Abdoulaye Wong M.D.  [address]  [place] [address]  [phone]      Call to schedule a hospital follow-up appointment.      MEDICATIONS ON DISCHARGE     Medication List      CHANGE how you take these medications      Instructions   mycophenolate 180 MG EC tablet  What changed:    · medication strength  · how much to take  Commonly known as:  Myfortic   Take 1 Tab by mouth every evening.  Dose:  180 mg        CONTINUE taking these medications      Instructions   acetaminophen 500 MG Tabs  Commonly known as:  TYLENOL   Take 500 mg by mouth every 6 hours as needed for Moderate Pain.  Dose:  500 mg     amLODIPine 10 MG Tabs  Commonly known as:  NORVASC   Take 10 mg by mouth every evening.  Dose:  10 mg     atenolol 25 MG Tabs  Commonly known as:  TENORMIN   Take 25 mg by mouth every evening.  Dose:  25 mg     hydroxychloroquine 200 MG Tabs  Commonly known as:  Plaquenil   Take 200 mg by mouth every evening.  Dose:  200 mg     hydrOXYzine pamoate 25 MG Caps  Commonly known as:  Vistaril   Take 1 Cap by mouth 3 times a day as needed for " Itching.  Dose:  25 mg     omeprazole 20 MG delayed-release capsule  Commonly known as:  PRILOSEC   Take 20 mg by mouth every evening.  Dose:  20 mg     ondansetron 4 MG Tbdp  Commonly known as:  ZOFRAN ODT   Take 1 Tab by mouth every four hours as needed for Nausea (give PO if no IV route available).  Dose:  4 mg        STOP taking these medications    predniSONE 5 MG Tabs  Commonly known as:  DELTASONE     valacyclovir 1 GM Tabs  Commonly known as:  VALTREX            Allergies  Allergies   Allergen Reactions   • Clindamycin Rash     Hive   • Keflex Rash     Hives   • Methylprednisolone      Anxious;   • Metoprolol Nausea       DIET  Orders Placed This Encounter   Procedures   • Diet Order Cardiac     Standing Status:   Standing     Number of Occurrences:   1     Order Specific Question:   Diet:     Answer:   Cardiac [6]       ACTIVITY  As tolerated.  Weight bearing as tolerated    CONSULTATIONS  Dr. Metz - Nephrology    PROCEDURES  None    LABORATORY  Lab Results   Component Value Date    SODIUM 129 (L) 07/02/2020    POTASSIUM 4.5 07/02/2020    CHLORIDE 90 (L) 07/02/2020    CO2 27 07/02/2020    GLUCOSE 92 07/02/2020    BUN 22 07/02/2020    CREATININE 4.47 (H) 07/02/2020        Lab Results   Component Value Date    WBC 5.9 07/02/2020    HEMOGLOBIN 6.3 (L) 07/02/2020    HEMATOCRIT 21.2 (L) 07/02/2020    PLATELETCT 131 (L) 07/02/2020        Total time of the discharge process exceeds 39 minutes.

## 2020-07-02 NOTE — PROGRESS NOTES
Assumed pt care. AAOx4. Pt denied pain or SOB, but c/o generalized itiching. Gave pt atarax. See MAR. Assessment completed. Reminded pt to call for assistance if she feels weak or dizzy. Call light within reach, bed in lowest position, will continue to monitor.

## 2020-07-02 NOTE — CARE PLAN
Problem: Safety  Goal: Will remain free from falls  Outcome: PROGRESSING AS EXPECTED  Note: Pt is up self and steady on her feet. Encouraged pt to call first if she feels weak or dizzy. Slipper socks are in place. Call light and overhead light are within reach.      Problem: Knowledge Deficit  Goal: Knowledge of disease process/condition, treatment plan, diagnostic tests, and medications will improve  Outcome: PROGRESSING AS EXPECTED  Intervention: Explain information regarding disease process/condition, treatment plan, diagnostic tests, and medications and document in education  Note: Updated pt on POC. Explained the purpose of pm meds, labs, and monitoring equipment to pt. Pt verbalized understanding.

## 2020-07-02 NOTE — PROGRESS NOTES
Received call from blood bank. Pt has a positive antibody screen, so the blood has to come from [facility]. It will take 3 hours to get here.

## 2020-07-02 NOTE — CARE PLAN
Problem: Safety  Goal: Will remain free from injury  Outcome: PROGRESSING AS EXPECTED  Note: Patient will remain free from falls/injuries this shift. Safety measures in place. Pt steady on feet. Denies dizziness.     Problem: Knowledge Deficit  Goal: Knowledge of disease process/condition, treatment plan, diagnostic tests, and medications will improve  Outcome: PROGRESSING AS EXPECTED  Note: Patient will be cooperative with cares this shift. Plan of care discussed with patient, verbalizes understanding.

## 2020-07-02 NOTE — DISCHARGE INSTRUCTIONS
Shingles    Shingles is an infection. It gives you a painful skin rash and blisters that have fluid in them. Shingles is caused by the same germ (virus) that causes chickenpox.  Shingles only happens in people who:  · Have had chickenpox.  · Have been given a shot of medicine (vaccine) to protect against chickenpox. Shingles is rare in this group.  The first symptoms of shingles may be itching, tingling, or pain in an area on your skin. A rash will show on your skin a few days or weeks later. The rash is likely to be on one side of your body. The rash usually has a shape like a belt or a band. Over time, the rash turns into fluid-filled blisters. The blisters will break open, change into scabs, and dry up. Medicines may:  · Help with pain and itching.  · Help you get better sooner.  · Help to prevent long-term problems.  Follow these instructions at home:  Medicines  · Take over-the-counter and prescription medicines only as told by your doctor.  · Put on an anti-itch cream or numbing cream where you have a rash, blisters, or scabs. Do this as told by your doctor.  Helping with itching and discomfort    · Put cold, wet cloths (cold compresses) on the area of the rash or blisters as told by your doctor.  · Cool baths can help you feel better. Try adding baking soda or dry oatmeal to the water to lessen itching. Do not bathe in hot water.  Blister and rash care  · Keep your rash covered with a loose bandage (dressing).  · Wear loose clothing that does not rub on your rash.  · Keep your rash and blisters clean. To do this, wash the area with mild soap and cool water as told by your doctor.  · Check your rash every day for signs of infection. Check for:  ? More redness, swelling, or pain.  ? Fluid or blood.  ? Warmth.  ? Pus or a bad smell.  · Do not scratch your rash. Do not pick at your blisters. To help you to not scratch:  ? Keep your fingernails clean and cut short.  ? Wear gloves or mittens when you sleep,  if scratching is a problem.  General instructions  · Rest as told by your doctor.  · Keep all follow-up visits as told by your doctor. This is important.  · Wash your hands often with soap and water. If soap and water are not available, use hand . Doing this lowers your chance of getting a skin infection caused by germs (bacteria).  · Your infection can cause chickenpox in people who have never had chickenpox or never got a shot of chickenpox vaccine. If you have blisters that did not change into scabs yet, try not to touch other people or be around other people, especially:  ? Babies.  ? Pregnant women.  ? Children who have areas of red, itchy, or rough skin (eczema).  ? Very old people who have transplants.  ? People who have a long-term (chronic) sickness, like cancer or AIDS.  Contact a doctor if:  · Your pain does not get better with medicine.  · Your pain does not get better after the rash heals.  · You have any signs of infection in the rash area. These signs include:  ? More redness, swelling, or pain around the rash.  ? Fluid or blood coming from the rash.  ? The rash area feeling warm to the touch.  ? Pus or a bad smell coming from the rash.  Get help right away if:  · The rash is on your face or nose.  · You have pain in your face or pain by your eye.  · You lose feeling on one side of your face.  · You have trouble seeing.  · You have ear pain, or you have ringing in your ear.  · You have a loss of taste.  · Your condition gets worse.  Summary  · Shingles gives you a painful skin rash and blisters that have fluid in them.  · Shingles is an infection. It is caused by the same germ (virus) that causes chickenpox.  · Keep your rash covered with a loose bandage (dressing). Wear loose clothing that does not rub on your rash.  · If you have blisters that did not change into scabs yet, try not to touch other people or be around people.  This information is not intended to replace advice given to you by  your health care provider. Make sure you discuss any questions you have with your health care provider.  Document Released: 06/05/2009 Document Revised: 04/10/2020 Document Reviewed: 08/22/2018  Elsevier Patient Education © 2020 [employer]    Blood Transfusion, Adult, Care After  This sheet gives you information about how to care for yourself after your procedure. Your doctor may also give you more specific instructions. If you have problems or questions, contact your doctor.  Follow these instructions at home:    · Take over-the-counter and prescription medicines only as told by your doctor.  · Go back to your normal activities as told by your doctor.  · Follow instructions from your doctor about how to take care of the area where an IV tube was put into your vein (insertion site). Make sure you:  ? Wash your hands with soap and water before you change your bandage (dressing). If there is no soap and water, use hand .  ? Change your bandage as told by your doctor.  · Check your IV insertion site every day for signs of infection. Check for:  ? More redness, swelling, or pain.  ? More fluid or blood.  ? Warmth.  ? Pus or a bad smell.  Contact a doctor if:  · You have more redness, swelling, or pain around the IV insertion site.  · You have more fluid or blood coming from the IV insertion site.  · Your IV insertion site feels warm to the touch.  · You have pus or a bad smell coming from the IV insertion site.  · Your pee (urine) turns pink, red, or brown.  · You feel weak after doing your normal activities.  Get help right away if:  · You have signs of a serious allergic or body defense (immune) system reaction, including:  ? Itchiness.  ? Hives.  ? Trouble breathing.  ? Anxiety.  ? Pain in your chest or lower back.  ? Fever, flushing, and chills.  ? Fast pulse.  ? Rash.  ? Watery poop (diarrhea).  ? Throwing up (vomiting).  ? Dark pee.  ? Serious headache.  ? Dizziness.  ? Stiff neck.  ? Yellow color in  your face or the white parts of your eyes (jaundice).  Summary  · After a blood transfusion, return to your normal activities as told by your doctor.  · Every day, check for signs of infection where the IV tube was put into your vein.  · Some signs of infection are warm skin, more redness and pain, more fluid or blood, and pus or a bad smell where the needle went in.  · Contact your doctor if you feel weak or have any unusual symptoms.  This information is not intended to replace advice given to you by your health care provider. Make sure you discuss any questions you have with your health care provider.  Document Released: 01/08/2016 Document Revised: 04/24/2019 Document Reviewed: 08/11/2017  [employer] Patient Education © 2020 [employer]    Discharge Instructions    Discharged to home by car with relative. Discharged via wheelchair, hospital escort: Yes.  Special equipment needed: Not Applicable    Be sure to schedule a follow-up appointment with your primary care doctor or any specialists as instructed.     Discharge Plan:        I understand that a diet low in cholesterol, fat, and sodium is recommended for good health. Unless I have been given specific instructions below for another diet, I accept this instruction as my diet prescription.   Other diet: renal/cardiac    Special Instructions: None    · Is patient discharged on Warfarin / Coumadin?   No     Depression / Suicide Risk    As you are discharged from this [facility] facility, it is important to learn how to keep safe from harming yourself.    Recognize the warning signs:  · Abrupt changes in personality, positive or negative- including increase in energy   · Giving away possessions  · Change in eating patterns- significant weight changes-  positive or negative  · Change in sleeping patterns- unable to sleep or sleeping all the time   · Unwillingness or inability to communicate  · Depression  · Unusual sadness, discouragement and loneliness  · Talk  of wanting to die  · Neglect of personal appearance   · Rebelliousness- reckless behavior  · Withdrawal from people/activities they love  · Confusion- inability to concentrate     If you or a loved one observes any of these behaviors or has concerns about self-harm, here's what you can do:  · Talk about it- your feelings and reasons for harming yourself  · Remove any means that you might use to hurt yourself (examples: pills, rope, extension cords, firearm)  · Get professional help from the community (Mental Health, Substance Abuse, psychological counseling)  · Do not be alone:Call your Safe Contact- someone whom you trust who will be there for you.  · Call your local CRISIS HOTLINE 034-1833 or 654-128-6525  · Call your local Children's Mobile Crisis Response Team [place] (537) 356-4606 or www.[employer]  · Call the toll free National Suicide Prevention Hotlines   · National Suicide Prevention Lifeline 221-438-SLOP (8785)  · National Hope Line Network 800-SUICIDE (475-6039)

## 2020-07-02 NOTE — PROGRESS NOTES
Blood has not been released from [facility] yet but is present in blood bank. Blood bank to call when ready for .

## 2020-07-02 NOTE — PROGRESS NOTES
Telemetry Shift Summary    Rhythm SR  HR Range 70-80's  Ectopy none  Measurements 0.18/0.08/0.36    Per TATIANNA Nam        Normal Values  Rhythm SR  HR Range    Measurements 0.12-0.20 / 0.06-0.10  / 0.30-0.52

## 2020-07-02 NOTE — PROGRESS NOTES
Blood bank called for status update on blood. Stated it was just delivered and will be ready in 5 mins. Per patient request, tylenol and benadryl given.

## 2020-07-02 NOTE — DISCHARGE PLANNING
Outpatient Dialysis Note    Confirmed patient is active at:    [address] [place] [address]    Schedule: Monday, Wednesday, Friday   Time: 06:00am    Spoke with Leila at facility who confirmed.    Forwarded records for review.    Mary Han- Dialysis Coordinator  [facility] # [phone]

## 2020-07-02 NOTE — CARE PLAN
Problem: Pain Management  Goal: Pain level will decrease to patient's comfort goal  Outcome: MET     Problem: Psychosocial Needs:  Goal: Level of anxiety will decrease  Outcome: MET     Problem: Communication  Goal: The ability to communicate needs accurately and effectively will improve  Outcome: MET     Problem: Safety  Goal: Will remain free from injury  7/2/2020 1444 by Ilana Bacon R.N.  Outcome: MET  7/2/2020 0756 by Ilana Bacon R.N.  Outcome: PROGRESSING AS EXPECTED  Note: Patient will remain free from falls/injuries this shift. Safety measures in place. Pt steady on feet. Denies dizziness.  Goal: Will remain free from falls  Outcome: MET     Problem: Infection  Goal: Will remain free from infection  Outcome: MET     Problem: Venous Thromboembolism (VTW)/Deep Vein Thrombosis (DVT) Prevention:  Goal: Patient will participate in Venous Thrombosis (VTE)/Deep Vein Thrombosis (DVT)Prevention Measures  Outcome: MET     Problem: Bowel/Gastric:  Goal: Normal bowel function is maintained or improved  Outcome: MET  Goal: Will not experience complications related to bowel motility  Outcome: MET     Problem: Knowledge Deficit  Goal: Knowledge of disease process/condition, treatment plan, diagnostic tests, and medications will improve  7/2/2020 1444 by Ilana Bacon R.N.  Outcome: MET  7/2/2020 0756 by Ilana Bacon R.N.  Outcome: PROGRESSING AS EXPECTED  Note: Patient will be cooperative with cares this shift. Plan of care discussed with patient, verbalizes understanding.  Goal: Knowledge of the prescribed therapeutic regimen will improve  Outcome: MET     Problem: Discharge Barriers/Planning  Goal: Patient's continuum of care needs will be met  Outcome: MET     Problem: Respiratory:  Goal: Respiratory status will improve  Outcome: MET     Problem: Urinary Elimination:  Goal: Ability to reestablish a normal urinary elimination pattern will improve  Outcome: MET

## 2020-07-03 LAB — DSDNA AB TITR SER CLIF: NORMAL {TITER}

## 2020-07-12 ENCOUNTER — OFFICE VISIT (OUTPATIENT)
Dept: URGENT CARE | Facility: PHYSICIAN GROUP | Age: 25
End: 2020-07-12
Payer: COMMERCIAL

## 2020-07-12 VITALS
RESPIRATION RATE: 14 BRPM | HEIGHT: 66 IN | OXYGEN SATURATION: 98 % | HEART RATE: 86 BPM | TEMPERATURE: 98.7 F | WEIGHT: 148 LBS | BODY MASS INDEX: 23.78 KG/M2 | SYSTOLIC BLOOD PRESSURE: 126 MMHG | DIASTOLIC BLOOD PRESSURE: 94 MMHG

## 2020-07-12 DIAGNOSIS — L08.9 SOFT TISSUE INFECTION: ICD-10-CM

## 2020-07-12 PROCEDURE — 99214 OFFICE O/P EST MOD 30 MIN: CPT | Performed by: FAMILY MEDICINE

## 2020-07-12 RX ORDER — DOXYCYCLINE HYCLATE 100 MG
100 TABLET ORAL 2 TIMES DAILY
Qty: 14 TAB | Refills: 0 | Status: SHIPPED | OUTPATIENT
Start: 2020-07-12 | End: 2020-07-19

## 2020-07-12 ASSESSMENT — ENCOUNTER SYMPTOMS
MYALGIAS: 0
VOMITING: 0
WEIGHT LOSS: 0
EYE DISCHARGE: 0
EYE REDNESS: 0
NAUSEA: 0

## 2020-07-12 ASSESSMENT — FIBROSIS 4 INDEX: FIB4 SCORE: 1.52

## 2020-07-12 NOTE — PROGRESS NOTES
"Subjective:      Lily Nicole is a 24 y.o. female who presents with Cyst (left ear lobe pain, redness, poss cyst)            2 days progressively worse right earlobe redness and swelling.  Pain is mild to moderate.  There is a small area of yellow crusting.  She has not worn earrings for over a year.  She does have PMH cystic acne as well as lupus and chronic kidney disease.  No recent swimming.  No injury.  No other aggravating or alleviating factors.      Review of Systems   Constitutional: Negative for malaise/fatigue and weight loss.   Eyes: Negative for discharge and redness.   Gastrointestinal: Negative for nausea and vomiting.   Musculoskeletal: Negative for joint pain and myalgias.   Skin: Negative for itching and rash.     .  Medications, Allergies, and current problem list reviewed today in Epic       Objective:     /94   Pulse 86   Temp 37.1 °C (98.7 °F)   Resp 14   Ht 1.685 m (5' 6.34\")   Wt 67.1 kg (148 lb)   LMP 06/29/2020   SpO2 98%   BMI 23.64 kg/m²      Physical Exam  Constitutional:       General: She is not in acute distress.     Appearance: She is well-developed.   HENT:      Head: Normocephalic and atraumatic.      Right Ear: Tympanic membrane normal.      Left Ear: Tympanic membrane normal.      Ears:     Eyes:      Conjunctiva/sclera: Conjunctivae normal.   Skin:     General: Skin is warm and dry.      Findings: No rash.   Neurological:      Mental Status: She is alert and oriented to person, place, and time.                 Assessment/Plan:     1. Soft tissue infection  doxycycline (VIBRAMYCIN) 100 MG Tab     Differential diagnosis, natural history, supportive care, and indications for immediate follow-up discussed at length.       "

## 2020-07-15 ENCOUNTER — HOSPITAL ENCOUNTER (OUTPATIENT)
Dept: LAB | Facility: MEDICAL CENTER | Age: 25
End: 2020-07-15
Attending: INTERNAL MEDICINE
Payer: COMMERCIAL

## 2020-07-15 LAB
ALBUMIN SERPL BCP-MCNC: 3.6 G/DL (ref 3.2–4.9)
ALBUMIN/GLOB SERPL: 0.8 G/DL
ALP SERPL-CCNC: 54 U/L (ref 30–99)
ALT SERPL-CCNC: 19 U/L (ref 2–50)
ANION GAP SERPL CALC-SCNC: 10 MMOL/L (ref 7–16)
ANISOCYTOSIS BLD QL SMEAR: ABNORMAL
AST SERPL-CCNC: 36 U/L (ref 12–45)
BASOPHILS # BLD AUTO: 1.8 % (ref 0–1.8)
BASOPHILS # BLD: 0.08 K/UL (ref 0–0.12)
BILIRUB SERPL-MCNC: 0.4 MG/DL (ref 0.1–1.5)
BUN SERPL-MCNC: 11 MG/DL (ref 8–22)
C3 SERPL-MCNC: 92.3 MG/DL (ref 87–200)
C4 SERPL-MCNC: 14.2 MG/DL (ref 19–52)
CALCIUM SERPL-MCNC: 9 MG/DL (ref 8.5–10.5)
CHLORIDE SERPL-SCNC: 90 MMOL/L (ref 96–112)
CO2 SERPL-SCNC: 33 MMOL/L (ref 20–33)
CREAT SERPL-MCNC: 3.54 MG/DL (ref 0.5–1.4)
CRP SERPL HS-MCNC: 1.64 MG/DL (ref 0–0.75)
EOSINOPHIL # BLD AUTO: 0 K/UL (ref 0–0.51)
EOSINOPHIL NFR BLD: 0 % (ref 0–6.9)
ERYTHROCYTE [DISTWIDTH] IN BLOOD BY AUTOMATED COUNT: 59.4 FL (ref 35.9–50)
ERYTHROCYTE [SEDIMENTATION RATE] IN BLOOD BY WESTERGREN METHOD: 115 MM/HOUR (ref 0–20)
FASTING STATUS PATIENT QL REPORTED: NORMAL
GLOBULIN SER CALC-MCNC: 4.8 G/DL (ref 1.9–3.5)
GLUCOSE SERPL-MCNC: 88 MG/DL (ref 65–99)
HCT VFR BLD AUTO: 30.2 % (ref 37–47)
HGB BLD-MCNC: 8.9 G/DL (ref 12–16)
LYMPHOCYTES # BLD AUTO: 0.4 K/UL (ref 1–4.8)
LYMPHOCYTES NFR BLD: 8.8 % (ref 22–41)
MACROCYTES BLD QL SMEAR: ABNORMAL
MANUAL DIFF BLD: NORMAL
MCH RBC QN AUTO: 28.2 PG (ref 27–33)
MCHC RBC AUTO-ENTMCNC: 29.5 G/DL (ref 33.6–35)
MCV RBC AUTO: 95.6 FL (ref 81.4–97.8)
MONOCYTES # BLD AUTO: 0.32 K/UL (ref 0–0.85)
MONOCYTES NFR BLD AUTO: 7 % (ref 0–13.4)
MORPHOLOGY BLD-IMP: NORMAL
NEUTROPHILS # BLD AUTO: 3.8 K/UL (ref 2–7.15)
NEUTROPHILS NFR BLD: 82.5 % (ref 44–72)
NRBC # BLD AUTO: 0 K/UL
NRBC BLD-RTO: 0 /100 WBC
PLATELET # BLD AUTO: 177 K/UL (ref 164–446)
PLATELET BLD QL SMEAR: NORMAL
PMV BLD AUTO: 10.7 FL (ref 9–12.9)
POTASSIUM SERPL-SCNC: 3.7 MMOL/L (ref 3.6–5.5)
PROT SERPL-MCNC: 8.4 G/DL (ref 6–8.2)
RBC # BLD AUTO: 3.16 M/UL (ref 4.2–5.4)
RBC BLD AUTO: PRESENT
SODIUM SERPL-SCNC: 133 MMOL/L (ref 135–145)
WBC # BLD AUTO: 4.6 K/UL (ref 4.8–10.8)

## 2020-07-15 PROCEDURE — 86160 COMPLEMENT ANTIGEN: CPT

## 2020-07-15 PROCEDURE — 36415 COLL VENOUS BLD VENIPUNCTURE: CPT

## 2020-07-15 PROCEDURE — 80053 COMPREHEN METABOLIC PANEL: CPT

## 2020-07-15 PROCEDURE — 85652 RBC SED RATE AUTOMATED: CPT

## 2020-07-15 PROCEDURE — 85027 COMPLETE CBC AUTOMATED: CPT

## 2020-07-15 PROCEDURE — 86140 C-REACTIVE PROTEIN: CPT

## 2020-07-15 PROCEDURE — 86225 DNA ANTIBODY NATIVE: CPT

## 2020-07-15 PROCEDURE — 85007 BL SMEAR W/DIFF WBC COUNT: CPT

## 2020-07-16 ENCOUNTER — HOSPITAL ENCOUNTER (OUTPATIENT)
Facility: MEDICAL CENTER | Age: 25
End: 2020-07-16
Attending: INTERNAL MEDICINE
Payer: COMMERCIAL

## 2020-07-16 LAB
APPEARANCE UR: CLEAR
BACTERIA #/AREA URNS HPF: NEGATIVE /HPF
BILIRUB UR QL STRIP.AUTO: NEGATIVE
COLOR UR: YELLOW
CREAT UR-MCNC: 18.93 MG/DL
EPI CELLS #/AREA URNS HPF: ABNORMAL /HPF
GLUCOSE UR STRIP.AUTO-MCNC: NEGATIVE MG/DL
HYALINE CASTS #/AREA URNS LPF: ABNORMAL /LPF
KETONES UR STRIP.AUTO-MCNC: NEGATIVE MG/DL
LEUKOCYTE ESTERASE UR QL STRIP.AUTO: NEGATIVE
MICRO URNS: ABNORMAL
NITRITE UR QL STRIP.AUTO: NEGATIVE
PH UR STRIP.AUTO: >=9 [PH] (ref 5–8)
PROT UR QL SSA: 100 MG/DL
PROT UR QL STRIP: 100 MG/DL
PROT UR-MCNC: 45 MG/DL (ref 0–15)
PROT/CREAT UR: 2377 MG/G (ref 10–107)
RBC # URNS HPF: ABNORMAL /HPF
RBC UR QL AUTO: ABNORMAL
SP GR UR STRIP.AUTO: 1.01
UROBILINOGEN UR STRIP.AUTO-MCNC: 0.2 MG/DL
WBC #/AREA URNS HPF: ABNORMAL /HPF

## 2020-07-16 PROCEDURE — 81001 URINALYSIS AUTO W/SCOPE: CPT

## 2020-07-18 LAB — DSDNA AB TITR SER CLIF: NORMAL {TITER}

## 2020-07-19 NOTE — ED NOTES
COVID-19 Test Follow Up  06/01/20      Patient tested negative for COVID-19. I have informed the patient of the result by My Chart message. Encouraged to stay at home until no fever for 72 hours without the use of fever reducing medications and symptoms improving. Informed there is no need to further self-isolate for 14 days for COVID-19 unless otherwise directed by the Health Dept.     2019-nCoV Source  NP Swab     2019-nCoV RNA Interp  Not detected        Telma Trujillo, PharmD    
Patient returned from imaging  
Patient transported to imaging  
Pt given discharge instructions. RN answered questions. VSS. Pt ambulated steadily out to [facility].   
pts urine collected and sent to lab     
20

## 2020-07-29 ENCOUNTER — OFFICE VISIT (OUTPATIENT)
Dept: URGENT CARE | Facility: PHYSICIAN GROUP | Age: 25
End: 2020-07-29
Payer: COMMERCIAL

## 2020-07-29 ENCOUNTER — HOSPITAL ENCOUNTER (OUTPATIENT)
Facility: MEDICAL CENTER | Age: 25
End: 2020-07-29
Attending: PHYSICIAN ASSISTANT
Payer: COMMERCIAL

## 2020-07-29 VITALS
WEIGHT: 141 LBS | TEMPERATURE: 98.1 F | DIASTOLIC BLOOD PRESSURE: 92 MMHG | HEART RATE: 54 BPM | RESPIRATION RATE: 12 BRPM | BODY MASS INDEX: 22.13 KG/M2 | HEIGHT: 67 IN | OXYGEN SATURATION: 96 % | SYSTOLIC BLOOD PRESSURE: 132 MMHG

## 2020-07-29 DIAGNOSIS — R11.0 NAUSEA: ICD-10-CM

## 2020-07-29 DIAGNOSIS — J02.9 SORE THROAT: ICD-10-CM

## 2020-07-29 DIAGNOSIS — Z20.822 SUSPECTED COVID-19 VIRUS INFECTION: ICD-10-CM

## 2020-07-29 LAB
COVID ORDER STATUS COVID19: NORMAL
INT CON NEG: NORMAL
INT CON POS: NORMAL
S PYO AG THROAT QL: NEGATIVE
SARS-COV-2 RNA RESP QL NAA+PROBE: NOTDETECTED
SPECIMEN SOURCE: NORMAL

## 2020-07-29 PROCEDURE — U0003 INFECTIOUS AGENT DETECTION BY NUCLEIC ACID (DNA OR RNA); SEVERE ACUTE RESPIRATORY SYNDROME CORONAVIRUS 2 (SARS-COV-2) (CORONAVIRUS DISEASE [COVID-19]), AMPLIFIED PROBE TECHNIQUE, MAKING USE OF HIGH THROUGHPUT TECHNOLOGIES AS DESCRIBED BY CMS-2020-01-R: HCPCS

## 2020-07-29 PROCEDURE — 87880 STREP A ASSAY W/OPTIC: CPT | Performed by: PHYSICIAN ASSISTANT

## 2020-07-29 PROCEDURE — 99213 OFFICE O/P EST LOW 20 MIN: CPT | Performed by: PHYSICIAN ASSISTANT

## 2020-07-29 ASSESSMENT — ENCOUNTER SYMPTOMS
CHANGE IN BOWEL HABIT: 0
FEVER: 1
SORE THROAT: 1
MYALGIAS: 0
VOMITING: 0
ABDOMINAL PAIN: 0
COUGH: 0
SHORTNESS OF BREATH: 0
NAUSEA: 1
HEADACHES: 1
DIARRHEA: 0
EYE REDNESS: 0
EYE DISCHARGE: 0

## 2020-07-29 ASSESSMENT — FIBROSIS 4 INDEX: FIB4 SCORE: 1.03

## 2020-07-29 NOTE — PATIENT INSTRUCTIONS
INSTRUCTIONS FOR COVID-19 OR ANY OTHER INFECTIOUS RESPIRATORY ILLNESSES    The Centers for Disease Control and Prevention (CDC) states that early indications for COVID-19 include cough, shortness of breath, difficulty breathing, or at least two of the following symptoms: chills, shaking with chills, muscle pain, headache, sore throat, and loss of taste or smell. Symptoms can range from mild to severe and may appear up to two weeks after exposure to the virus.    The practice of self-isolation and quarantine helps protect the public and your family by  preventing exposure to people who have or may have a contagious disease. Please follow the prevention steps below as based on CDC guidelines:    WHEN TO STOP ISOLATION: Persons with COVID-19 or any other infectious respiratory illness who have symptoms and were advised to care for themselves at home may discontinue home isolation under the following conditions:  · At least 3 days (72 hours) have passed since recovery defined as resolution of fever without the use of fever-reducing medications; AND,  · Improvement in respiratory symptoms (e.g., cough, shortness of breath); AND,  · At least 10 days have passed since symptoms first appeared and have had no subsequent illness.    MONITOR YOUR SYMPTOMS: If your illness is worsening, seek prompt medical attention. If you have a medical emergency and need to call 911, notify the dispatch personnel that you have, or are being evaluated for confirmed or suspected COVID-19 or another infectious respiratory illness. Wear a facemask if possible.    ACTIVITY RESTRICTION: restrict activities outside your home, except for getting medical care. Do not go to work, school, or public areas. Avoid using public transportation, ride-sharing, or taxis.    SCHEDULED MEDICAL APPOINTMENTS: Notify your provider that you have, or are being evaluated for, confirmed or suspected COVID-19 or another infectious respiratory. This will help the  healthcare provider’s office safely take care of you and keep other people from getting exposed or infected.    FACEMASKS, when to wear: Anytime you are away from your home or around other people or pets. If you are unable to wear one, maintain a minimum of 6 feet distancing from others.    LIVING ENVIRONMENT: Stay in a separate room from other people and pets. If possible, use a separate bathroom, have someone else care for your pets and avoid sharing household items. Any items used should be washed thoroughly with soap and water. Clean all “high-touch” surfaces every day. Use a household cleaning spray or wipe, according to the label instructions. High touch surfaces include (but are not limited to) counters, tabletops, doorknobs, bathroom fixtures, toilets, phones, keyboards, tablets, and bedside tables.     HAND WASHING: Frequently wash hands with soap and water for at least 20 seconds,  especially after blowing your nose, coughing, or sneezing; going to the bathroom; before and after interacting with pets; and before and after eating or preparing food. If hands are visibly dirty use soap and water. If soap and water are not available, use an alcohol-based hand  with at least 60% alcohol. Avoid touching your eyes, nose, and mouth with unwashed hands. Cover your coughs and sneezes with a tissue. Throw used tissues in a lined trash can. Immediately wash your hands.    ACTIVE/FACILITATED SELF-MONITORING: Follow instructions provided by your local health department or health professionals, as appropriate. When working with your local health department check their available hours.    [facility]   Phone Number   [facility] [phone]   [facility] [phone]   [place] Call 211   [place] [phone]     IF YOU HAVE CONFIRMED POSITIVE COVID-19:    Those who have completely recovered from COVID-19 may have immune-boosting antibodies in their plasma--called “convalescent plasma”--that  could be used to treat critically ill COVID19 patients.    [facility] is excited to begin working with [employer] on collecting convalescent plasma from  people who have recovered from COVID-19 as part of a program to treat patients infected with the virus. This FDA-approved “emergency investigational new drug” is a special blood product containing antibodies that may give patients an extra boost to fight the virus.    To be eligible to donate convalescent plasma, you must have a prior COVID-19 diagnosis documented by a laboratory test (or a positive test result for SARS-CoV-2 antibodies) and meet additional eligibility requirements.    If you are interested in donating convalescent plasma or have any additional questions, please contact the [facility] Convalescent Plasma  at [phone] or via e-mail at [email or web address].

## 2020-07-29 NOTE — PROGRESS NOTES
Subjective:      Lily Nicole is a 24 y.o. female who presents with Nausea (nausea x2 days, 100.1f temp, )        Nausea   This is a new problem. Episode onset: x 2 days ago. The problem occurs intermittently. The problem has been unchanged. Associated symptoms include a fever (The patient reports associated fever onset yesterday with a max temp of 101, now resolved.), headaches (The patient reports an intermittent headache.), nausea and a sore throat (The patient reports an intermittent sore throat.). Pertinent negatives include no abdominal pain, change in bowel habit, chest pain, congestion, coughing, myalgias, rash, urinary symptoms or vomiting. The symptoms are aggravated by eating. She has tried acetaminophen (and Zofran) for the symptoms.     The patient presents to clinic complaining of intermittent nausea x2 days.  The patient also reports an associated sore throat and intermittent headache.  The patient states that she developed a fever yesterday with a max temp of 101.  The patient states her fever is now resolved.  The patient reports no associated vomiting.  No abdominal pain.  No diarrhea.  No cough.  No congestion.  No shortness of breath.  No chest pain.  The patient has taken Tylenol and Zofran for her current symptoms.    The patient reports no known exposure to COVID-19. She also reports no sick contacts.     The patient states she is currently on dialysis 3 days a week for end-stage renal disease.  The patient recently completed dialysis this morning.    PMH:  has a past medical history of Anemia (01/17/2018), AVF (arteriovenous fistula) ([facility]), Dialysis patient ([facility]), ESRD (end stage renal disease) on dialysis ([facility]) (01/17/2018), Heart burn, Hypertension (01/17/2018), Indigestion, Lupus ([facility]), Migraines (01/17/2018), and Seizure ([facility]) (2013).  MEDS:   Current Outpatient Medications:   •  mycophenolate (MYFORTIC) 180 MG EC tablet, Take 1 Tab by mouth every evening., Disp: 30 Tab, Rfl: 1  •   "omeprazole (PRILOSEC) 20 MG delayed-release capsule, Take 20 mg by mouth every evening., Disp: , Rfl:   •  acetaminophen (TYLENOL) 500 MG Tab, Take 500 mg by mouth every 6 hours as needed for Moderate Pain., Disp: , Rfl:   •  atenolol (TENORMIN) 25 MG Tab, Take 25 mg by mouth every evening., Disp: , Rfl:   •  ondansetron (ZOFRAN ODT) 4 MG TABLET DISPERSIBLE, Take 1 Tab by mouth every four hours as needed for Nausea (give PO if no IV route available)., Disp: 10 Tab, Rfl: 0  •  amLODIPine (NORVASC) 10 MG Tab, Take 10 mg by mouth every evening., Disp: , Rfl:   •  hydroxychloroquine (PLAQUENIL) 200 MG Tab, Take 200 mg by mouth every evening., Disp: , Rfl:   ALLERGIES:   Allergies   Allergen Reactions   • Clindamycin Rash     Hive   • Keflex Rash     Hives   • Methylprednisolone      Anxious;   • Metoprolol Nausea     SURGHX:   Past Surgical History:   Procedure Laterality Date   • GASTROSCOPY N/A 5/30/2020    Procedure: GASTROSCOPY;  Surgeon: Waylon Mcqueen M.D.;  Location: SURGERY [facility];  Service: Gastroenterology   • GASTROSCOPY-ENDO  12/9/2019    Procedure: GASTROSCOPY;  Surgeon: Aaron Kam M.D.;  Location: [facility];  Service: Gastroenterology   • ANGIOPLASTY  01/17/2018    \"Right Arm AV-Fistulagram & Angioplastyx3\"   • ULI BY LAPAROSCOPY  4/5/2010    Performed by SYED MARTELL at SURGERY [facility]   • AV FISTULA CREATION Right    • OTHER      renal biopsy x 3   • OTHER      bone marrow biopsy     SOCHX:  reports that she has never smoked. She has never used smokeless tobacco. She reports that she does not drink alcohol or use drugs.  FH: Family history was reviewed, no pertinent findings to report      Review of Systems   Constitutional: Positive for fever (The patient reports associated fever onset yesterday with a max temp of 101, now resolved.).   HENT: Positive for sore throat (The patient reports an intermittent sore throat.). Negative for congestion and ear " "pain.    Eyes: Negative for discharge and redness.   Respiratory: Negative for cough and shortness of breath.    Cardiovascular: Negative for chest pain and leg swelling.   Gastrointestinal: Positive for nausea. Negative for abdominal pain, change in bowel habit, diarrhea and vomiting.   Musculoskeletal: Negative for myalgias.   Skin: Negative for rash.   Neurological: Positive for headaches (The patient reports an intermittent headache.).          Objective:     /92   Pulse (!) 54   Temp 36.7 °C (98.1 °F)   Resp 12   Ht 1.702 m (5' 7\")   Wt 64 kg (141 lb)   LMP 07/25/2020 (Exact Date)   SpO2 96%   BMI 22.08 kg/m²      Physical Exam  Constitutional:       General: She is not in acute distress.     Appearance: Normal appearance. She is not ill-appearing.   HENT:      Head: Normocephalic and atraumatic.      Right Ear: External ear normal.      Left Ear: External ear normal.      Nose: Nose normal.      Mouth/Throat:      Mouth: Mucous membranes are moist.      Pharynx: Oropharynx is clear. No posterior oropharyngeal erythema.   Eyes:      Extraocular Movements: Extraocular movements intact.      Conjunctiva/sclera: Conjunctivae normal.   Neck:      Musculoskeletal: Normal range of motion and neck supple.   Cardiovascular:      Rate and Rhythm: Normal rate and regular rhythm.      Heart sounds: Normal heart sounds.   Pulmonary:      Effort: Pulmonary effort is normal. No respiratory distress.      Breath sounds: Normal breath sounds. No wheezing.   Abdominal:      General: Bowel sounds are normal.      Palpations: Abdomen is soft.      Tenderness: There is no abdominal tenderness.   Musculoskeletal: Normal range of motion.      Comments:   AV fistula present to the right upper extremity with palpable thrill.   Skin:     General: Skin is warm and dry.   Neurological:      Mental Status: She is alert and oriented to person, place, and time.            Progress:  POCT Rapid Strep: Negative     COVID-19 PCR " - pending    Recheck:  HR: 80  POX: 96% on room air      Assessment/Plan:     1. Sore throat  - POCT Rapid Strep A    2. Nausea    3. Suspected COVID-19 virus infection  - COVID/SARS COV-2 PCR; Future    The patient's presenting symptoms and physical exam findings are consistent with nausea with an associated sore throat.  The patient's physical exam today in clinic was normal.  The patient's lungs are clear to auscultation without wheezing, and her pulse ox was within normal limits.  The patient's abdomen was soft and nontender.  The patient's bowel sounds were normal.  The patient appears in no acute distress.  The patient's vital signs are stable and within normal limits.  She is afebrile today in clinic.  The patient's rapid strep test today in clinic was negative.  Based on the patient's presenting symptoms, will test the patient for COVID-19 at this time.  Advised the patient to stay at home under self quarantine while awaiting test results.  Provided the patient with home isolation and self quarantine instructions.  Recommend OTC medications and supportive care for symptomatic management.  Recommend patient follow-up with her PCP.  Discussed STRICT ED precautions with the patient, and she verbalized understanding.    Differential diagnoses, supportive care, and indications for immediate follow-up discussed with patient.   Instructed to return to clinic or nearest emergency department for any change in condition, further concerns, or worsening of symptoms.    Continue Zofran as prescribed  OTC Tylenol for fever/discomfort  OTC Supportive Care for Sore Throat - warm salt water gargles, sore throat lozenges, warm lemon water, and/or tea.  Drink plenty of fluids  Advised the patient to stay at home under self-isolation until symptoms have been present for at least 10 days and are improved, and there has been no fever for at least 72 hours.  Follow-up with PCP  Return to clinic or go to the ED if symptoms worsen  or fail to improve, or if the patient should develop worsening/increasing/persistent nausea, vomiting, diarrhea, abdominal pain, urinary symptoms, sore throat, difficulty swallowing, drooling, change in voice, cough, congestion, ear pain, shortness of breath, chest pain fever/chills, and/or any concerning symptoms.    Discussed plan with the patient, and she agrees to the above.     Please note that this dictation was created using voice recognition software. I have made every reasonable attempt to correct obvious errors, but I expect that there may be errors of grammar and possibly content that I did not discover before finalizing the note.

## 2020-07-30 ENCOUNTER — TELEPHONE (OUTPATIENT)
Dept: URGENT CARE | Facility: PHYSICIAN GROUP | Age: 25
End: 2020-07-30

## 2020-07-30 NOTE — TELEPHONE ENCOUNTER
7/30 @ 11:30AM     Spoke with the patient regarding her COVID results.  Informed the patient her COVID test was negative.  The patient states she is feeling better.  Recommend the patient return to clinic or go to the ED for any worsening or concerning symptoms.  The patient no further questions at this time.

## 2020-08-02 ENCOUNTER — HOSPITAL ENCOUNTER (EMERGENCY)
Facility: MEDICAL CENTER | Age: 25
End: 2020-08-02
Attending: EMERGENCY MEDICINE
Payer: COMMERCIAL

## 2020-08-02 ENCOUNTER — APPOINTMENT (OUTPATIENT)
Dept: RADIOLOGY | Facility: MEDICAL CENTER | Age: 25
End: 2020-08-02
Attending: EMERGENCY MEDICINE
Payer: COMMERCIAL

## 2020-08-02 VITALS
WEIGHT: 143.3 LBS | DIASTOLIC BLOOD PRESSURE: 103 MMHG | RESPIRATION RATE: 40 BRPM | TEMPERATURE: 98.2 F | OXYGEN SATURATION: 91 % | HEART RATE: 92 BPM | HEIGHT: 67 IN | BODY MASS INDEX: 22.49 KG/M2 | SYSTOLIC BLOOD PRESSURE: 173 MMHG

## 2020-08-02 DIAGNOSIS — J81.0 ACUTE PULMONARY EDEMA (HCC): ICD-10-CM

## 2020-08-02 DIAGNOSIS — N18.6 ESRD (END STAGE RENAL DISEASE) (HCC): ICD-10-CM

## 2020-08-02 DIAGNOSIS — R07.89 OTHER CHEST PAIN: ICD-10-CM

## 2020-08-02 DIAGNOSIS — E87.70 HYPERVOLEMIA, UNSPECIFIED HYPERVOLEMIA TYPE: ICD-10-CM

## 2020-08-02 LAB
ALBUMIN SERPL BCP-MCNC: 3.3 G/DL (ref 3.2–4.9)
ALBUMIN/GLOB SERPL: 0.7 G/DL
ALP SERPL-CCNC: 52 U/L (ref 30–99)
ALT SERPL-CCNC: 8 U/L (ref 2–50)
ANION GAP SERPL CALC-SCNC: 13 MMOL/L (ref 7–16)
ANISOCYTOSIS BLD QL SMEAR: ABNORMAL
AST SERPL-CCNC: 27 U/L (ref 12–45)
BASOPHILS # BLD AUTO: 0.5 % (ref 0–1.8)
BASOPHILS # BLD: 0.03 K/UL (ref 0–0.12)
BILIRUB SERPL-MCNC: 0.6 MG/DL (ref 0.1–1.5)
BUN SERPL-MCNC: 37 MG/DL (ref 8–22)
CALCIUM SERPL-MCNC: 9.1 MG/DL (ref 8.4–10.2)
CHLORIDE SERPL-SCNC: 89 MMOL/L (ref 96–112)
CO2 SERPL-SCNC: 28 MMOL/L (ref 20–33)
COMMENT 1642: NORMAL
COVID ORDER STATUS COVID19: NORMAL
CREAT SERPL-MCNC: 8.26 MG/DL (ref 0.5–1.4)
CRP SERPL HS-MCNC: 2.45 MG/DL (ref 0–0.75)
D DIMER PPP IA.FEU-MCNC: 1.63 UG/ML (FEU) (ref 0–0.5)
EKG IMPRESSION: NORMAL
EOSINOPHIL # BLD AUTO: 0.02 K/UL (ref 0–0.51)
EOSINOPHIL NFR BLD: 0.3 % (ref 0–6.9)
ERYTHROCYTE [DISTWIDTH] IN BLOOD BY AUTOMATED COUNT: 64.6 FL (ref 35.9–50)
ERYTHROCYTE [SEDIMENTATION RATE] IN BLOOD BY WESTERGREN METHOD: 55 MM/HOUR (ref 0–20)
GLOBULIN SER CALC-MCNC: 5 G/DL (ref 1.9–3.5)
GLUCOSE SERPL-MCNC: 93 MG/DL (ref 65–99)
HCT VFR BLD AUTO: 34.5 % (ref 37–47)
HGB BLD-MCNC: 10.2 G/DL (ref 12–16)
IMM GRANULOCYTES # BLD AUTO: 0.02 K/UL (ref 0–0.11)
IMM GRANULOCYTES NFR BLD AUTO: 0.3 % (ref 0–0.9)
LYMPHOCYTES # BLD AUTO: 0.9 K/UL (ref 1–4.8)
LYMPHOCYTES NFR BLD: 13.8 % (ref 22–41)
MACROCYTES BLD QL SMEAR: ABNORMAL
MCH RBC QN AUTO: 27.6 PG (ref 27–33)
MCHC RBC AUTO-ENTMCNC: 29.6 G/DL (ref 33.6–35)
MCV RBC AUTO: 93.5 FL (ref 81.4–97.8)
MONOCYTES # BLD AUTO: 0.32 K/UL (ref 0–0.85)
MONOCYTES NFR BLD AUTO: 4.9 % (ref 0–13.4)
NEUTROPHILS # BLD AUTO: 5.22 K/UL (ref 2–7.15)
NEUTROPHILS NFR BLD: 80.2 % (ref 44–72)
NRBC # BLD AUTO: 0 K/UL
NRBC BLD-RTO: 0 /100 WBC
NT-PROBNP SERPL IA-MCNC: ABNORMAL PG/ML (ref 0–125)
PLATELET # BLD AUTO: 193 K/UL (ref 164–446)
PLATELET BLD QL SMEAR: NORMAL
PMV BLD AUTO: 9.7 FL (ref 9–12.9)
POTASSIUM SERPL-SCNC: 4.2 MMOL/L (ref 3.6–5.5)
PROT SERPL-MCNC: 8.3 G/DL (ref 6–8.2)
RBC # BLD AUTO: 3.69 M/UL (ref 4.2–5.4)
RBC BLD AUTO: PRESENT
SARS-COV-2 RNA RESP QL NAA+PROBE: NOTDETECTED
SODIUM SERPL-SCNC: 130 MMOL/L (ref 135–145)
SPECIMEN SOURCE: NORMAL
TROPONIN T SERPL-MCNC: 731 NG/L (ref 6–19)
WBC # BLD AUTO: 6.5 K/UL (ref 4.8–10.8)

## 2020-08-02 PROCEDURE — 85652 RBC SED RATE AUTOMATED: CPT

## 2020-08-02 PROCEDURE — 80053 COMPREHEN METABOLIC PANEL: CPT

## 2020-08-02 PROCEDURE — 71045 X-RAY EXAM CHEST 1 VIEW: CPT

## 2020-08-02 PROCEDURE — 93005 ELECTROCARDIOGRAM TRACING: CPT | Performed by: EMERGENCY MEDICINE

## 2020-08-02 PROCEDURE — 85025 COMPLETE CBC W/AUTO DIFF WBC: CPT

## 2020-08-02 PROCEDURE — 99284 EMERGENCY DEPT VISIT MOD MDM: CPT

## 2020-08-02 PROCEDURE — U0003 INFECTIOUS AGENT DETECTION BY NUCLEIC ACID (DNA OR RNA); SEVERE ACUTE RESPIRATORY SYNDROME CORONAVIRUS 2 (SARS-COV-2) (CORONAVIRUS DISEASE [COVID-19]), AMPLIFIED PROBE TECHNIQUE, MAKING USE OF HIGH THROUGHPUT TECHNOLOGIES AS DESCRIBED BY CMS-2020-01-R: HCPCS

## 2020-08-02 PROCEDURE — 83880 ASSAY OF NATRIURETIC PEPTIDE: CPT

## 2020-08-02 PROCEDURE — 84484 ASSAY OF TROPONIN QUANT: CPT

## 2020-08-02 PROCEDURE — 36415 COLL VENOUS BLD VENIPUNCTURE: CPT

## 2020-08-02 PROCEDURE — 85379 FIBRIN DEGRADATION QUANT: CPT

## 2020-08-02 PROCEDURE — 86140 C-REACTIVE PROTEIN: CPT

## 2020-08-02 PROCEDURE — C9803 HOPD COVID-19 SPEC COLLECT: HCPCS | Performed by: EMERGENCY MEDICINE

## 2020-08-02 PROCEDURE — 93005 ELECTROCARDIOGRAM TRACING: CPT

## 2020-08-02 ASSESSMENT — FIBROSIS 4 INDEX: FIB4 SCORE: 1.03

## 2020-08-03 NOTE — ED TRIAGE NOTES
"Presents complaining of central chest pain with associated dyspnea at rest recurring for the past 2 days.   Chief Complaint   Patient presents with   • Chest Pain   • Shortness of Breath     BP (!) 163/114   Pulse 97   Temp 36.8 °C (98.2 °F) (Temporal)   Resp 20   Ht 1.702 m (5' 7\")   Wt 65 kg (143 lb 4.8 oz)   LMP 07/25/2020 (Exact Date)   SpO2 94%   BMI 22.44 kg/m²     "

## 2020-08-03 NOTE — ED NOTES
Kristine from Lab called with critical result of [id number] at 2024. Critical lab result read back to Kristine.   Dr. Pang notified of critical lab result at 1824.  Critical lab result read back by .

## 2020-08-03 NOTE — ED NOTES
Dc instructions reviewed with pt. To f/u with dialysis in am as well as pcp, return for worsening s/s

## 2020-08-03 NOTE — DISCHARGE INSTRUCTIONS
Your symptoms appear to be due to fluid overload.  Follow-up for dialysis first thing in the morning as scheduled.  Follow-up with your doctor if still symptomatic after dialysis.  Return for leg swelling, changing chest pain, worsening dyspnea or ill appearance.  You will be called with COVID test results tomorrow.

## 2020-08-03 NOTE — ED NOTES
COVID-19 Test Follow Up  08/03/20      Patient tested negative for COVID-19. I have informed the patient of the result by MyChart. Encouraged to stay at home until no fever for 72 hours without the use of fever reducing medications and symptoms improving. Informed there is no need to further self-isolate for 14 days for COVID-19 unless otherwise directed by the Health Dept.     They are advised to return to the ER for worsening symptoms including difficulty breathing, ongoing fever, weakness or chest pain.    Ilana Angel V, PharmD

## 2020-08-03 NOTE — ED NOTES
covid sample collecter per new [facility]. Ice chips alos provided per pt request  And erp approval, call light in reach, denies further needs

## 2020-08-25 RX ORDER — ATENOLOL 25 MG/1
TABLET ORAL
Qty: 90 TAB | Refills: 0 | Status: SHIPPED | OUTPATIENT
Start: 2020-08-25 | End: 2020-09-16

## 2020-09-16 ENCOUNTER — HOSPITAL ENCOUNTER (INPATIENT)
Facility: MEDICAL CENTER | Age: 25
LOS: 3 days | DRG: 377 | End: 2020-09-19
Attending: EMERGENCY MEDICINE | Admitting: INTERNAL MEDICINE
Payer: COMMERCIAL

## 2020-09-16 ENCOUNTER — APPOINTMENT (OUTPATIENT)
Dept: RADIOLOGY | Facility: MEDICAL CENTER | Age: 25
DRG: 377 | End: 2020-09-16
Attending: EMERGENCY MEDICINE
Payer: COMMERCIAL

## 2020-09-16 DIAGNOSIS — K92.2 UPPER GI BLEED: ICD-10-CM

## 2020-09-16 PROBLEM — D69.6 THROMBOCYTOPENIA (HCC): Status: ACTIVE | Noted: 2020-09-16

## 2020-09-16 LAB
ABO GROUP BLD: NORMAL
ALBUMIN SERPL BCP-MCNC: 3.8 G/DL (ref 3.2–4.9)
ALBUMIN/GLOB SERPL: 0.7 G/DL
ALP SERPL-CCNC: 67 U/L (ref 30–99)
ALT SERPL-CCNC: 10 U/L (ref 2–50)
ANION GAP SERPL CALC-SCNC: 9 MMOL/L (ref 7–16)
APTT PPP: 26.9 SEC (ref 24.7–36)
AST SERPL-CCNC: 25 U/L (ref 12–45)
BARCODED ABORH UBTYP: 5100
BARCODED PRD CODE UBPRD: NORMAL
BARCODED UNIT NUM UBUNT: NORMAL
BASOPHILS # BLD AUTO: 0.8 % (ref 0–1.8)
BASOPHILS # BLD: 0.03 K/UL (ref 0–0.12)
BILIRUB SERPL-MCNC: 0.4 MG/DL (ref 0.1–1.5)
BLD GP AB SCN SERPL QL: NORMAL
BUN SERPL-MCNC: 18 MG/DL (ref 8–22)
CALCIUM SERPL-MCNC: 8.9 MG/DL (ref 8.4–10.2)
CHLORIDE SERPL-SCNC: 87 MMOL/L (ref 96–112)
CO2 SERPL-SCNC: 37 MMOL/L (ref 20–33)
COMPONENT R 8504R: NORMAL
COVID ORDER STATUS COVID19: NORMAL
CREAT SERPL-MCNC: 2.92 MG/DL (ref 0.5–1.4)
EOSINOPHIL # BLD AUTO: 0.02 K/UL (ref 0–0.51)
EOSINOPHIL NFR BLD: 0.6 % (ref 0–6.9)
ERYTHROCYTE [DISTWIDTH] IN BLOOD BY AUTOMATED COUNT: 61.6 FL (ref 35.9–50)
GLOBULIN SER CALC-MCNC: 5.6 G/DL (ref 1.9–3.5)
GLUCOSE SERPL-MCNC: 91 MG/DL (ref 65–99)
HCT VFR BLD AUTO: 26 % (ref 37–47)
HGB BLD-MCNC: 7.9 G/DL (ref 12–16)
HGB BLD-MCNC: 8 G/DL (ref 12–16)
IMM GRANULOCYTES # BLD AUTO: 0.04 K/UL (ref 0–0.11)
IMM GRANULOCYTES NFR BLD AUTO: 1.1 % (ref 0–0.9)
INR PPP: 1.06 (ref 0.87–1.13)
LYMPHOCYTES # BLD AUTO: 0.45 K/UL (ref 1–4.8)
LYMPHOCYTES NFR BLD: 12.4 % (ref 22–41)
MCH RBC QN AUTO: 27.9 PG (ref 27–33)
MCHC RBC AUTO-ENTMCNC: 30.8 G/DL (ref 33.6–35)
MCV RBC AUTO: 90.6 FL (ref 81.4–97.8)
MONOCYTES # BLD AUTO: 0.3 K/UL (ref 0–0.85)
MONOCYTES NFR BLD AUTO: 8.3 % (ref 0–13.4)
NEUTROPHILS # BLD AUTO: 2.79 K/UL (ref 2–7.15)
NEUTROPHILS NFR BLD: 76.8 % (ref 44–72)
NRBC # BLD AUTO: 0 K/UL
NRBC BLD-RTO: 0 /100 WBC
PLATELET # BLD AUTO: 72 K/UL (ref 164–446)
PMV BLD AUTO: 11.5 FL (ref 9–12.9)
POTASSIUM SERPL-SCNC: 3.5 MMOL/L (ref 3.6–5.5)
PRODUCT TYPE UPROD: NORMAL
PROT SERPL-MCNC: 9.4 G/DL (ref 6–8.2)
PROTHROMBIN TIME: 13.5 SEC (ref 12–14.6)
RBC # BLD AUTO: 2.87 M/UL (ref 4.2–5.4)
RH BLD: NORMAL
SODIUM SERPL-SCNC: 133 MMOL/L (ref 135–145)
UNIT STATUS USTAT: NORMAL
WBC # BLD AUTO: 3.6 K/UL (ref 4.8–10.8)

## 2020-09-16 PROCEDURE — 86922 COMPATIBILITY TEST ANTIGLOB: CPT

## 2020-09-16 PROCEDURE — 96365 THER/PROPH/DIAG IV INF INIT: CPT

## 2020-09-16 PROCEDURE — 36430 TRANSFUSION BLD/BLD COMPNT: CPT

## 2020-09-16 PROCEDURE — 700105 HCHG RX REV CODE 258: Performed by: EMERGENCY MEDICINE

## 2020-09-16 PROCEDURE — 700102 HCHG RX REV CODE 250 W/ 637 OVERRIDE(OP): Performed by: INTERNAL MEDICINE

## 2020-09-16 PROCEDURE — 80053 COMPREHEN METABOLIC PANEL: CPT

## 2020-09-16 PROCEDURE — U0003 INFECTIOUS AGENT DETECTION BY NUCLEIC ACID (DNA OR RNA); SEVERE ACUTE RESPIRATORY SYNDROME CORONAVIRUS 2 (SARS-COV-2) (CORONAVIRUS DISEASE [COVID-19]), AMPLIFIED PROBE TECHNIQUE, MAKING USE OF HIGH THROUGHPUT TECHNOLOGIES AS DESCRIBED BY CMS-2020-01-R: HCPCS

## 2020-09-16 PROCEDURE — 86901 BLOOD TYPING SEROLOGIC RH(D): CPT

## 2020-09-16 PROCEDURE — 71046 X-RAY EXAM CHEST 2 VIEWS: CPT

## 2020-09-16 PROCEDURE — 30233N1 TRANSFUSION OF NONAUTOLOGOUS RED BLOOD CELLS INTO PERIPHERAL VEIN, PERCUTANEOUS APPROACH: ICD-10-PCS | Performed by: STUDENT IN AN ORGANIZED HEALTH CARE EDUCATION/TRAINING PROGRAM

## 2020-09-16 PROCEDURE — 99285 EMERGENCY DEPT VISIT HI MDM: CPT

## 2020-09-16 PROCEDURE — C9803 HOPD COVID-19 SPEC COLLECT: HCPCS | Performed by: INTERNAL MEDICINE

## 2020-09-16 PROCEDURE — 85025 COMPLETE CBC W/AUTO DIFF WBC: CPT

## 2020-09-16 PROCEDURE — 86850 RBC ANTIBODY SCREEN: CPT

## 2020-09-16 PROCEDURE — 36415 COLL VENOUS BLD VENIPUNCTURE: CPT

## 2020-09-16 PROCEDURE — 96375 TX/PRO/DX INJ NEW DRUG ADDON: CPT

## 2020-09-16 PROCEDURE — C9113 INJ PANTOPRAZOLE SODIUM, VIA: HCPCS | Performed by: EMERGENCY MEDICINE

## 2020-09-16 PROCEDURE — A9270 NON-COVERED ITEM OR SERVICE: HCPCS | Performed by: INTERNAL MEDICINE

## 2020-09-16 PROCEDURE — 94760 N-INVAS EAR/PLS OXIMETRY 1: CPT

## 2020-09-16 PROCEDURE — 85018 HEMOGLOBIN: CPT

## 2020-09-16 PROCEDURE — 700111 HCHG RX REV CODE 636 W/ 250 OVERRIDE (IP): Performed by: INTERNAL MEDICINE

## 2020-09-16 PROCEDURE — 700105 HCHG RX REV CODE 258: Performed by: INTERNAL MEDICINE

## 2020-09-16 PROCEDURE — 85730 THROMBOPLASTIN TIME PARTIAL: CPT

## 2020-09-16 PROCEDURE — 86900 BLOOD TYPING SEROLOGIC ABO: CPT

## 2020-09-16 PROCEDURE — 700111 HCHG RX REV CODE 636 W/ 250 OVERRIDE (IP): Performed by: EMERGENCY MEDICINE

## 2020-09-16 PROCEDURE — P9016 RBC LEUKOCYTES REDUCED: HCPCS

## 2020-09-16 PROCEDURE — C9113 INJ PANTOPRAZOLE SODIUM, VIA: HCPCS | Performed by: INTERNAL MEDICINE

## 2020-09-16 PROCEDURE — 770020 HCHG ROOM/CARE - TELE (206)

## 2020-09-16 PROCEDURE — 85610 PROTHROMBIN TIME: CPT

## 2020-09-16 PROCEDURE — 96366 THER/PROPH/DIAG IV INF ADDON: CPT

## 2020-09-16 PROCEDURE — 99223 1ST HOSP IP/OBS HIGH 75: CPT | Performed by: INTERNAL MEDICINE

## 2020-09-16 RX ORDER — HYDROXYCHLOROQUINE SULFATE 200 MG/1
200 TABLET, FILM COATED ORAL EVERY EVENING
Status: DISCONTINUED | OUTPATIENT
Start: 2020-09-16 | End: 2020-09-19 | Stop reason: HOSPADM

## 2020-09-16 RX ORDER — BISACODYL 10 MG
10 SUPPOSITORY, RECTAL RECTAL
Status: DISCONTINUED | OUTPATIENT
Start: 2020-09-16 | End: 2020-09-19 | Stop reason: HOSPADM

## 2020-09-16 RX ORDER — MYCOPHENOLIC ACID 180 MG/1
180 TABLET, DELAYED RELEASE ORAL EVERY EVENING
Status: DISCONTINUED | OUTPATIENT
Start: 2020-09-16 | End: 2020-09-19 | Stop reason: HOSPADM

## 2020-09-16 RX ORDER — ONDANSETRON 2 MG/ML
4 INJECTION INTRAMUSCULAR; INTRAVENOUS ONCE
Status: COMPLETED | OUTPATIENT
Start: 2020-09-16 | End: 2020-09-16

## 2020-09-16 RX ORDER — ONDANSETRON 4 MG/1
4 TABLET, ORALLY DISINTEGRATING ORAL EVERY 4 HOURS PRN
Status: DISCONTINUED | OUTPATIENT
Start: 2020-09-16 | End: 2020-09-19 | Stop reason: HOSPADM

## 2020-09-16 RX ORDER — ONDANSETRON 2 MG/ML
4 INJECTION INTRAMUSCULAR; INTRAVENOUS EVERY 4 HOURS PRN
Status: DISCONTINUED | OUTPATIENT
Start: 2020-09-16 | End: 2020-09-19 | Stop reason: HOSPADM

## 2020-09-16 RX ORDER — DIPHENHYDRAMINE HYDROCHLORIDE 50 MG/ML
25 INJECTION INTRAMUSCULAR; INTRAVENOUS ONCE
Status: COMPLETED | OUTPATIENT
Start: 2020-09-16 | End: 2020-09-16

## 2020-09-16 RX ORDER — PROMETHAZINE HYDROCHLORIDE 25 MG/1
12.5-25 SUPPOSITORY RECTAL EVERY 4 HOURS PRN
Status: DISCONTINUED | OUTPATIENT
Start: 2020-09-16 | End: 2020-09-19 | Stop reason: HOSPADM

## 2020-09-16 RX ORDER — PROCHLORPERAZINE EDISYLATE 5 MG/ML
5-10 INJECTION INTRAMUSCULAR; INTRAVENOUS EVERY 4 HOURS PRN
Status: DISCONTINUED | OUTPATIENT
Start: 2020-09-16 | End: 2020-09-19 | Stop reason: HOSPADM

## 2020-09-16 RX ORDER — ATENOLOL 25 MG/1
25 TABLET ORAL EVERY EVENING
Status: ON HOLD | COMMUNITY
End: 2021-03-06

## 2020-09-16 RX ORDER — PROMETHAZINE HYDROCHLORIDE 25 MG/1
12.5-25 TABLET ORAL EVERY 4 HOURS PRN
Status: DISCONTINUED | OUTPATIENT
Start: 2020-09-16 | End: 2020-09-19 | Stop reason: HOSPADM

## 2020-09-16 RX ORDER — POLYETHYLENE GLYCOL 3350 17 G/17G
1 POWDER, FOR SOLUTION ORAL
Status: DISCONTINUED | OUTPATIENT
Start: 2020-09-16 | End: 2020-09-19 | Stop reason: HOSPADM

## 2020-09-16 RX ORDER — ACETAMINOPHEN 325 MG/1
650 TABLET ORAL EVERY 6 HOURS PRN
Status: DISCONTINUED | OUTPATIENT
Start: 2020-09-16 | End: 2020-09-19 | Stop reason: HOSPADM

## 2020-09-16 RX ORDER — AMOXICILLIN 250 MG
2 CAPSULE ORAL 2 TIMES DAILY
Status: DISCONTINUED | OUTPATIENT
Start: 2020-09-16 | End: 2020-09-19 | Stop reason: HOSPADM

## 2020-09-16 RX ADMIN — DIPHENHYDRAMINE HYDROCHLORIDE 25 MG: 50 INJECTION, SOLUTION INTRAMUSCULAR; INTRAVENOUS at 16:03

## 2020-09-16 RX ADMIN — ACETAMINOPHEN 650 MG: 325 TABLET, FILM COATED ORAL at 13:58

## 2020-09-16 RX ADMIN — ONDANSETRON HYDROCHLORIDE 4 MG: 2 SOLUTION INTRAMUSCULAR; INTRAVENOUS at 10:46

## 2020-09-16 RX ADMIN — MYCOPHENOLIC ACID 180 MG: 180 TABLET, DELAYED RELEASE ORAL at 18:00

## 2020-09-16 RX ADMIN — HYDROXYCHLOROQUINE SULFATE 200 MG: 200 TABLET, FILM COATED ORAL at 17:10

## 2020-09-16 RX ADMIN — SODIUM CHLORIDE 8 MG/HR: 9 INJECTION, SOLUTION INTRAVENOUS at 21:36

## 2020-09-16 RX ADMIN — SODIUM CHLORIDE 8 MG/HR: 9 INJECTION, SOLUTION INTRAVENOUS at 10:40

## 2020-09-16 RX ADMIN — SODIUM CHLORIDE 80 MG: 9 INJECTION, SOLUTION INTRAVENOUS at 10:40

## 2020-09-16 ASSESSMENT — ENCOUNTER SYMPTOMS
EYE DISCHARGE: 0
WEIGHT LOSS: 0
BACK PAIN: 0
FOCAL WEAKNESS: 0
MUSCULOSKELETAL NEGATIVE: 1
FEVER: 0
MYALGIAS: 0
RESPIRATORY NEGATIVE: 1
NAUSEA: 1
INSOMNIA: 0
PSYCHIATRIC NEGATIVE: 1
PALPITATIONS: 0
VOMITING: 1
NERVOUS/ANXIOUS: 0
SHORTNESS OF BREATH: 0
ORTHOPNEA: 0
EYES NEGATIVE: 1
BLURRED VISION: 0
CHILLS: 0
NECK PAIN: 0
DEPRESSION: 0
DIZZINESS: 0
EYE PAIN: 0
DIARRHEA: 0
SEIZURES: 0
BRUISES/BLEEDS EASILY: 1
CARDIOVASCULAR NEGATIVE: 1
DIZZINESS: 1
HEADACHES: 0
SPUTUM PRODUCTION: 0
STRIDOR: 0
COUGH: 0
EYE REDNESS: 0
HEARTBURN: 0
ABDOMINAL PAIN: 0
CONSTITUTIONAL NEGATIVE: 1

## 2020-09-16 ASSESSMENT — COGNITIVE AND FUNCTIONAL STATUS - GENERAL
SUGGESTED CMS G CODE MODIFIER MOBILITY: CH
MOBILITY SCORE: 24
DAILY ACTIVITIY SCORE: 24
SUGGESTED CMS G CODE MODIFIER DAILY ACTIVITY: CH

## 2020-09-16 ASSESSMENT — LIFESTYLE VARIABLES
HAVE YOU EVER FELT YOU SHOULD CUT DOWN ON YOUR DRINKING: NO
HOW MANY TIMES IN THE PAST YEAR HAVE YOU HAD 5 OR MORE DRINKS IN A DAY: 0
ALCOHOL_USE: NO
CONSUMPTION TOTAL: NEGATIVE
TOTAL SCORE: 0
TOTAL SCORE: 0
EVER HAD A DRINK FIRST THING IN THE MORNING TO STEADY YOUR NERVES TO GET RID OF A HANGOVER: NO
EVER FELT BAD OR GUILTY ABOUT YOUR DRINKING: NO
TOTAL SCORE: 0
ON A TYPICAL DAY WHEN YOU DRINK ALCOHOL HOW MANY DRINKS DO YOU HAVE: 0
HAVE PEOPLE ANNOYED YOU BY CRITICIZING YOUR DRINKING: NO
AVERAGE NUMBER OF DAYS PER WEEK YOU HAVE A DRINK CONTAINING ALCOHOL: 0

## 2020-09-16 ASSESSMENT — PAIN DESCRIPTION - DESCRIPTORS: DESCRIPTORS: ACHING

## 2020-09-16 ASSESSMENT — FIBROSIS 4 INDEX
FIB4 SCORE: 2.42
FIB4 SCORE: 1.09

## 2020-09-16 ASSESSMENT — PAIN DESCRIPTION - PAIN TYPE: TYPE: ACUTE PAIN

## 2020-09-16 NOTE — H&P
Hospital Medicine History & Physical Note    Date of Service  9/16/2020    Primary Care Physician  Ella Matthew M.D.    Consultants  GI    Code Status  Full Code    Chief Complaint  Chief Complaint   Patient presents with   • Abdominal Pain   • Nausea   • Vomiting     blood dark red       History of Presenting Illness  24 y.o. female with PMH of gastritis, esophagitis, nida thao tear history, ERSD on HD MWF who presented 9/16/2020 with hemetamesis. Per [facility] she vomited about 50 cc bright red blood on the way here. Patient denied any abdominal pain, nausea, black colored stool.  Her HGB was 8.2 and ERP ordered 1 unit of prbc.  GI was consulted.    Review of Systems  Review of Systems   Constitutional: Negative for chills, fever and weight loss.   HENT: Negative for congestion and nosebleeds.    Eyes: Negative for blurred vision, pain, discharge and redness.   Respiratory: Negative for cough, sputum production, shortness of breath and stridor. Hemoptysis: \    Cardiovascular: Negative for chest pain, palpitations and orthopnea.   Gastrointestinal: Positive for nausea and vomiting. Negative for abdominal pain, diarrhea and heartburn.   Genitourinary: Negative for dysuria, frequency and urgency.   Musculoskeletal: Negative for back pain, myalgias and neck pain.   Skin: Negative for itching and rash.   Neurological: Negative for dizziness, focal weakness, seizures and headaches.   Psychiatric/Behavioral: Negative for depression. The patient is not nervous/anxious and does not have insomnia.        Past Medical History   has a past medical history of Anemia (01/17/2018), AVF (arteriovenous fistula) ([facility]), Dialysis patient ([facility]), ESRD (end stage renal disease) on dialysis ([facility]) (01/17/2018), Heart burn, Hypertension (01/17/2018), Indigestion, Lupus ([facility]), Migraines (01/17/2018), and Seizure ([facility]) (2013).    Surgical History   has a past surgical history that includes ronak by laparoscopy (4/5/2010); av fistula  creation (Right); angioplasty (01/17/2018); other; other; gastroscopy-endo (12/9/2019); and gastroscopy (N/A, 5/30/2020).     Family History  family history includes Diabetes in her paternal grandmother.     Social History   reports that she has never smoked. She has never used smokeless tobacco. She reports that she does not drink alcohol or use drugs.    Allergies  Allergies   Allergen Reactions   • Clindamycin Rash     Hive   • Keflex Rash     Hives   • Methylprednisolone      Anxious;   • Metoprolol Nausea       Medications  Prior to Admission Medications   Prescriptions Last Dose Informant Patient Reported? Taking?   acetaminophen (TYLENOL) 500 MG Tab > 2 weeks at Unknown Patient Yes No   Sig: Take 500 mg by mouth every 6 hours as needed for Moderate Pain.   amLODIPine (NORVASC) 10 MG Tab 9/15/2020 at 2000 Patient Yes No   Sig: Take 10 mg by mouth every evening.   atenolol (TENORMIN) 25 MG Tab 9/15/2020 at 2000 Patient Yes Yes   Sig: Take 25 mg by mouth every evening.   hydroxychloroquine (PLAQUENIL) 200 MG Tab 9/15/2020 at 2000 Patient Yes No   Sig: Take 200 mg by mouth every evening.   mycophenolate (MYFORTIC) 180 MG EC tablet 9/15/2020 at 2000 Patient No No   Sig: Take 1 Tab by mouth every evening.   omeprazole (PRILOSEC) 20 MG delayed-release capsule 9/15/2020 at 2000 Patient Yes No   Sig: Take 20 mg by mouth every evening.      Facility-Administered Medications: None       Physical Exam  Temp:  [36.7 °C (98 °F)] 36.7 °C (98 °F)  Pulse:  [89] 89  Resp:  [16] 16  BP: (135)/(83) 135/83    Physical Exam  Vitals signs reviewed.   Constitutional:       General: She is not in acute distress.     Appearance: Normal appearance.   HENT:      Head: Normocephalic and atraumatic.      Nose: No congestion or rhinorrhea.   Eyes:      Extraocular Movements: Extraocular movements intact.      Pupils: Pupils are equal, round, and reactive to light.   Neck:      Musculoskeletal: Normal range of motion and neck supple.    Cardiovascular:      Rate and Rhythm: Normal rate and regular rhythm.      Pulses: Normal pulses.   Pulmonary:      Effort: Pulmonary effort is normal. No respiratory distress.      Breath sounds: Normal breath sounds.   Abdominal:      General: Bowel sounds are normal. There is no distension.      Palpations: Abdomen is soft.      Tenderness: There is no abdominal tenderness.   Musculoskeletal:         General: No swelling or tenderness.   Skin:     General: Skin is warm.      Findings: No erythema.   Neurological:      General: No focal deficit present.      Mental Status: She is alert and oriented to person, place, and time.         Laboratory:  Recent Labs     09/16/20  1007   WBC 3.6*   RBC 2.87*   HEMOGLOBIN 8.0*   HEMATOCRIT 26.0*   MCV 90.6   MCH 27.9   MCHC 30.8*   RDW 61.6*   PLATELETCT 72*   MPV 11.5     Recent Labs     09/16/20  1007   SODIUM 133*   POTASSIUM 3.5*   CHLORIDE 87*   CO2 37*   GLUCOSE 91   BUN 18   CREATININE 2.92*   CALCIUM 8.9     Recent Labs     09/16/20  1007   ALTSGPT 10   ASTSGOT 25   ALKPHOSPHAT 67   TBILIRUBIN 0.4   GLUCOSE 91     Recent Labs     09/16/20  1007   APTT 26.9   INR 1.06     No results for input(s): NTPROBNP in the last 72 hours.      No results for input(s): TROPONINT in the last 72 hours.    Imaging:  DX-CHEST-2 VIEWS   Final Result      No evidence of acute cardiopulmonary process.            Assessment/Plan:  I anticipate this patient will require at least two midnights for appropriate medical management, necessitating inpatient admission.    Upper GI bleed- (present on admission)  Assessment & Plan  History of Karen Keyes Tear, Gastritis and duodenitis, esophagitis from EGD on 5/2020  1 unit of prbc   Trend cbc closely  Transfuse as needed with target HGB>7  On pantoprazole infusion  NPO  GI consulted    ESRD (end stage renal disease) on dialysis (HCC)- (present on admission)  Assessment & Plan  On HD MWF  Finished HD today  To consult nephro on  friday    Thrombocytopenia (HCC)- (present on admission)  Assessment & Plan  Platelet count 72 significant drop from last month 193  The patient is taking mycophenolate for her ESRD  Follow cbc closely      Pancytopenia (HCC)- (present on admission)  Assessment & Plan  Patient has history of lupus and on mycophenolate  Follow cbc closely

## 2020-09-16 NOTE — ED PROVIDER NOTES
ED Provider Note        Primary care provider: Ella Matthew M.D.    I verified that the patient was wearing a mask and I was wearing appropriate PPE every time I entered the room. The patient's mask was on the patient at all times during my encounter except for a brief view of the oropharynx.      CHIEF COMPLAINT  Chief Complaint   Patient presents with   • Abdominal Pain   • Nausea   • Vomiting     blood dark red       HPI  Lily Nicole is a 24 y.o. female who presents to the Emergency Department with chief complaint of abdominal pain and hematemesis.  Patient was at dialysis today she was approximately 15 minutes from finishing dialysis when she got extremely nauseated and she vomited what EMS is estimating is 50 cc of bright red as well as some dark clotted blood.  Patient does report that she is had this issue in the past and told that she had bleeding ulcers on previous EGD she reports that she has had some epigastric pain over the last week.  She denies any dark tarry stools she denies any headache altered mental status any chest pain or palpitations she denies any shortness of breath fevers muscle aches any other acute symptoms or concerns.    REVIEW OF SYSTEMS  10 systems reviewed and otherwise negative, pertinent positives and negatives listed in the history of present illness.    PAST MEDICAL HISTORY   has a past medical history of Anemia (01/17/2018), AVF (arteriovenous fistula) ([facility]), Dialysis patient ([facility]), ESRD (end stage renal disease) on dialysis ([facility]) (01/17/2018), Heart burn, Hypertension (01/17/2018), Indigestion, Lupus ([facility]), Migraines (01/17/2018), and Seizure ([facility]) (2013).    SURGICAL HISTORY   has a past surgical history that includes ronak by laparoscopy (4/5/2010); av fistula creation (Right); angioplasty (01/17/2018); other; other; gastroscopy-endo (12/9/2019); and gastroscopy (N/A, 5/30/2020).    SOCIAL HISTORY  Social History     Tobacco Use   • Smoking status: Never Smoker  "  • Smokeless tobacco: Never Used   Substance Use Topics   • Alcohol use: No   • Drug use: No      Social History     Substance and Sexual Activity   Drug Use No       FAMILY HISTORY  Non-Contributory    CURRENT MEDICATIONS  Home Medications    **Home medications have not yet been reviewed for this encounter**         ALLERGIES  Allergies   Allergen Reactions   • Clindamycin Rash     Hive   • Keflex Rash     Hives   • Methylprednisolone      Anxious;   • Metoprolol Nausea       PHYSICAL EXAM  VITAL SIGNS: /83   Pulse 89   Temp 36.7 °C (98 °F) (Temporal)   Resp 16   Ht 1.499 m (4' 11\")   Wt 57.2 kg (126 lb 1.7 oz)   LMP 09/16/2020 (Exact Date)   BMI 25.47 kg/m²   Pulse ox interpretation: I interpret this pulse ox as normal.  Constitutional: Alert and oriented x 3, minimal distress  HEENT: Atraumatic normocephalic, pupils are equal round reactive to light extraocular movements are intact. The nares is clear, external ears are normal, mouth shows moist mucous membranes  Neck: Supple, no JVD no tracheal deviation  Cardiovascular: Regular rate and rhythm no murmur rub or gallop 2+ pulses peripherally x4  Thorax & Lungs: No respiratory distress, no wheezes rales or rhonchi, No chest tenderness.   GI: Minor tenderness in the epigastrium no rebound or guarding positive bowel sounds  Skin: Warm dry no acute rash or lesion  Musculoskeletal: Moving all extremities with full range and 5 of 5 strength, no acute  deformity  Neurologic: Cranial nerves III through XII are grossly intact, no sensory deficit, no cerebellar dysfunction   Psychiatric: Appropriate affect for situation at this time      DIAGNOSTIC STUDIES / PROCEDURES  LABS      Results for orders placed or performed during the hospital encounter of 09/16/20   CBC WITH DIFFERENTIAL   Result Value Ref Range    WBC 3.6 (L) 4.8 - 10.8 K/uL    RBC 2.87 (L) 4.20 - 5.40 M/uL    Hemoglobin 8.0 (L) 12.0 - 16.0 g/dL    Hematocrit 26.0 (L) 37.0 - 47.0 %    MCV 90.6 " 81.4 - 97.8 fL    MCH 27.9 27.0 - 33.0 pg    MCHC 30.8 (L) 33.6 - 35.0 g/dL    RDW 61.6 (H) 35.9 - 50.0 fL    Platelet Count 72 (L) 164 - 446 K/uL    MPV 11.5 9.0 - 12.9 fL    Neutrophils-Polys 76.80 (H) 44.00 - 72.00 %    Lymphocytes 12.40 (L) 22.00 - 41.00 %    Monocytes 8.30 0.00 - 13.40 %    Eosinophils 0.60 0.00 - 6.90 %    Basophils 0.80 0.00 - 1.80 %    Immature Granulocytes 1.10 (H) 0.00 - 0.90 %    Nucleated RBC 0.00 /100 WBC    Neutrophils (Absolute) 2.79 2.00 - 7.15 K/uL    Lymphs (Absolute) 0.45 (L) 1.00 - 4.80 K/uL    Monos (Absolute) 0.30 0.00 - 0.85 K/uL    Eos (Absolute) 0.02 0.00 - 0.51 K/uL    Baso (Absolute) 0.03 0.00 - 0.12 K/uL    Immature Granulocytes (abs) 0.04 0.00 - 0.11 K/uL    NRBC (Absolute) 0.00 K/uL   COMP METABOLIC PANEL   Result Value Ref Range    Sodium 133 (L) 135 - 145 mmol/L    Potassium 3.5 (L) 3.6 - 5.5 mmol/L    Chloride 87 (L) 96 - 112 mmol/L    Co2 37 (H) 20 - 33 mmol/L    Anion Gap 9.0 7.0 - 16.0    Glucose 91 65 - 99 mg/dL    Bun 18 8 - 22 mg/dL    Creatinine 2.92 (H) 0.50 - 1.40 mg/dL    Calcium 8.9 8.4 - 10.2 mg/dL    AST(SGOT) 25 12 - 45 U/L    ALT(SGPT) 10 2 - 50 U/L    Alkaline Phosphatase 67 30 - 99 U/L    Total Bilirubin 0.4 0.1 - 1.5 mg/dL    Albumin 3.8 3.2 - 4.9 g/dL    Total Protein 9.4 (H) 6.0 - 8.2 g/dL    Globulin 5.6 (H) 1.9 - 3.5 g/dL    A-G Ratio 0.7 g/dL   APTT   Result Value Ref Range    APTT 26.9 24.7 - 36.0 sec   PROTHROMBIN TIME (INR)   Result Value Ref Range    PT 13.5 12.0 - 14.6 sec    INR 1.06 0.87 - 1.13   COD (ADULT)   Result Value Ref Range    ABO Grouping Only O     Rh Grouping Only POS     Antibody Screen-Cod NEG     Component R       R99                 Red Cells, LR       [id number]   selected     09/16/20   12:49      Product Type R99     Dispense Status selected     Unit Number (Barcoded) [id number]     Product Code (Barcoded) Q5423F41     Blood Type (Barcoded) 5100    ESTIMATED GFR   Result Value Ref Range    GFR If   "24 (A) >60 mL/min/1.73 m 2    GFR If Non African American 20 (A) >60 mL/min/1.73 m 2       All labs reviewed by me.      RADIOLOGY  DX-CHEST-2 VIEWS   Final Result      No evidence of acute cardiopulmonary process.        The radiologist's interpretation of all radiological studies have been reviewed by me.    COURSE & MEDICAL DECISION MAKING  Pertinent Labs & Imaging studies reviewed. (See chart for details)    10:02 AM - Patient seen and examined at bedside.     Coagulation studies were ordered in light of active bleeding    Patient noted to have slightly elevated blood pressure likely circumstantial secondary to presenting complaint. Referred to primary care physician for further evaluation.    Medical Decision Making: Very pleasant 24-year-old female with extensive past medical history including renal failure dialysis dependent as well as previous gastrointestinal hemorrhage presents with fairly large amount of bright red blood as well as dark clotted blood hematemesis prior to arrival she has minor ongoing abdominal pain.  She is given Protonix bolus as well as started on a Protonix drip.  Patient H&H shows a hemoglobin drop of approximately 2 g from her previous value and this is coming directly from dialysis there they removed 2 L I would feel as though she would be somewhat hemoconcentrated at this point.  I think with the concern for active bleeding is appropriate to transfuse at this point.  She is given 1 L of packed red blood cells I discussed this with gastroenterology Dr. Whitney, who is graciously agreed to consult on the patient her vital signs are stable at this point she is discussed with hospitalist Dr. Dhaliwal admitted to the hospital service for further evaluation and manage pain.  Admitted in guarded condition.    /83   Pulse 89   Temp 36.7 °C (98 °F) (Temporal)   Resp 16   Ht 1.499 m (4' 11\")   Wt 57.2 kg (126 lb 1.7 oz)   LMP 09/16/2020 (Exact Date)   BMI 25.47 kg/m² "             FINAL IMPRESSION  1. Upper GI bleed Active   2.  Anemia, multifactorial anemia of chronic disease as well as acute blood loss      This dictation has been created using voice recognition software and/or scribes. The accuracy of the dictation is limited by the abilities of the software and the expertise of the scribes. I expect there may be some errors of grammar and possibly content. I made every attempt to manually correct the errors within my dictation. However, errors related to voice recognition software and/or scribes may still exist and should be interpreted within the appropriate context.

## 2020-09-16 NOTE — ASSESSMENT & PLAN NOTE
- History of Karen Keyes Tear, Gastritis and duodenitis, esophagitis from EGD on 5/2020  - EGD 9/18: gastropathy with 3 sites oozing in cardia and 1 site in the antrum s/p hemostasis with epinephrine and bicap. Will start PPI BID and Carafate per GI recommendations  - Received 1 unit of PRBC on admission. Hgb trended down this morning. Will repeat in AM prior to discharge  - Goal HGB>7, conservative transfusion strategy to avoid volume overload in ESRD  - Advance diet to renal

## 2020-09-16 NOTE — ASSESSMENT & PLAN NOTE
- Platelet count 72, significant drop from last month 193. Stable during admission  - Likely related to mycophenolate, continue to monitor. Follow up with PCP

## 2020-09-16 NOTE — ASSESSMENT & PLAN NOTE
- Secondary to lupus nephritis, On HD MWF  - Patient underwent dialysis today, continue HD as scheduled

## 2020-09-16 NOTE — ED NOTES
Francis called from [facility]'s blood bank to double check that the physician only wanted 1 unit of blood. Physician comfirmed. [facility] stated it would be approx 1 hr before unit arrives at . Pt stable.

## 2020-09-16 NOTE — PROGRESS NOTES
"Paged  about getting an IV benadryl order for blood transfusion based on \"itchy\" reaction from previous transfusions.  "

## 2020-09-16 NOTE — PROGRESS NOTES
Assumed pt care from ED. Pt is in hospital bed hooked up to tele monitor and changed into new gown. Pt is accompanied by mom at bedside. Pt is complaining of lower back/abdominal pain. Pt states she wants lupus doctor included in conversation about care. No other concerns/questions at this time. Safety precautions in place, call light within reach. Admit profile complete. POC discussed.

## 2020-09-16 NOTE — ASSESSMENT & PLAN NOTE
- Patient has history of lupus and on mycophenolate  - Pt reports history of pancytopenia in past  - Plts currently stable, continue to monitor CBC

## 2020-09-16 NOTE — CONSULTS
Gastroenterology Consult Note:    ERICA Mcgrath  Date & Time note created:    9/16/2020   3:18 PM     Referring MD:  Dr. Ankit Davidson MD    Patient ID:  Name:             Lily Abdi   YOB: 1995  Age:                 24 y.o.  female   MRN:               [id number]                                                             Reason for Consult:      Hematemesis and anemia    History of Present Illness:    She is a 24-year-old female patient seen in consultation for hematemesis and anemia.  She is well-known to our practice and does have a significant medical history including lupus, lupus nephritis, end-stage renal disease on dialysis Monday Wednesday and Friday.  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  She also has a history of pancytopenia related to her multiple medical problems.    Today the patient states that she was receiving dialysis and became quickly nauseated.  She vomited x1 with a moderate amount of black and bloody vomitus.  She felt mildly dizzy while this occurred as well.  After vomiting nausea resolved and dizziness resolved as well.  She denies abdominal pain.  She denies NSAID use or alcohol use.  Her hemoglobin today is 8.0, which is 2 points lower than her normal around 10. However, the patient does tell me today that her hemoglobin trends a little bit lower when she is on her menstrual cycle, which she is currently on right now.  She has been taking her omeprazole daily.  CMP today significant for potassium 3.5, chloride 87, CO2 37, creatinine 2.92.  INR today 1.06.    The patient tells me that she is recently had flares of her lupus over the last several months.  She is on prednisone 10 mg daily.  Review of Systems:      Review of Systems   Constitutional: Negative.    HENT: Negative.    Eyes: Negative.    Respiratory: Negative.   "  Cardiovascular: Negative.    Gastrointestinal: Positive for nausea and vomiting (Black and bloody).   Genitourinary: Negative.    Musculoskeletal: Negative.    Skin: Negative.    Neurological: Positive for dizziness.   Endo/Heme/Allergies: Bruises/bleeds easily.   Psychiatric/Behavioral: Negative.              Past Medical History:   Past Medical History:   Diagnosis Date   • Anemia 01/17/2018   • AVF (arteriovenous fistula) ([facility])     Right Arm   • Dialysis patient ([facility])      dialysis, M,W,F [facility]   • ESRD (end stage renal disease) on dialysis ([facility]) 01/17/2018    Twan Fu   • Heart burn    • Hypertension 01/17/2018    \"Controlled with medication\"   • Indigestion    • Lupus ([facility])    • Migraines 01/17/2018   • Seizure ([facility]) 2013    from high blood pressure, reports 1 time event       Past Surgical History:  Past Surgical History:   Procedure Laterality Date   • GASTROSCOPY N/A 5/30/2020    Procedure: GASTROSCOPY;  Surgeon: Waylon Mcqueen M.D.;  Location: SURGERY [facility];  Service: Gastroenterology   • GASTROSCOPY-ENDO  12/9/2019    Procedure: GASTROSCOPY;  Surgeon: Aaron Kam M.D.;  Location: SURGERY [facility];  Service: Gastroenterology   • ANGIOPLASTY  01/17/2018    \"Right Arm AV-Fistulagram & Angioplastyx3\"   • ULI BY LAPAROSCOPY  4/5/2010    Performed by SYED MARTELL at SURGERY [facility]   • AV FISTULA CREATION Right    • OTHER      renal biopsy x 3   • OTHER      bone marrow biopsy       Hospital Medications:    Current Facility-Administered Medications:   •  pantoprazole (PROTONIX) 80 mg in  mL Infusion, 8 mg/hr, Intravenous, Continuous, Skinny Pérez M.D., Last Rate: 25 mL/hr at 09/16/20 1040, 8 mg/hr at 09/16/20 1040  •  hydroxychloroquine (Plaquenil) tablet 200 mg, 200 mg, Oral, Q EVENING, Ankit Davidson M.D.  •  mycophenolate (MYFORTIC) TBEC 180 mg, 180 mg, Oral, Q EVENING, Ankit Davidson M.D.  •  senna-docusate (PERICOLACE or SENOKOT S) 8.6-50 MG per " tablet 2 Tab, 2 Tab, Oral, BID **AND** polyethylene glycol/lytes (MIRALAX) PACKET 1 Packet, 1 Packet, Oral, QDAY PRN **AND** magnesium hydroxide (MILK OF MAGNESIA) suspension 30 mL, 30 mL, Oral, QDAY PRN **AND** bisacodyl (DULCOLAX) suppository 10 mg, 10 mg, Rectal, QDAY PRN, Ankit Davidson M.D.  •  acetaminophen (TYLENOL) tablet 650 mg, 650 mg, Oral, Q6HRS PRN, Ankit Davidson M.D., Stopped at 09/16/20 1409  •  ondansetron (ZOFRAN) syringe/vial injection 4 mg, 4 mg, Intravenous, Q4HRS PRN, Ankit Davidson M.D.  •  ondansetron (ZOFRAN ODT) dispertab 4 mg, 4 mg, Oral, Q4HRS PRN, Ankit Davidson M.D.  •  promethazine (PHENERGAN) tablet 12.5-25 mg, 12.5-25 mg, Oral, Q4HRS PRN, Ankit Davidson M.D.  •  promethazine (PHENERGAN) suppository 12.5-25 mg, 12.5-25 mg, Rectal, Q4HRS PRN, Ankit Davidson M.D.  •  prochlorperazine (COMPAZINE) injection 5-10 mg, 5-10 mg, Intravenous, Q4HRS PRN, Ankit Davidson M.D.  •  pantoprazole (PROTONIX) 80 mg in  mL IVPB, 80 mg, Intravenous, Once, Ankit Davidson M.D., Stopped at 09/16/20 1200  •  pantoprazole (PROTONIX) 80 mg in  mL Infusion, 8 mg/hr, Intravenous, Continuous, Ankit Davidson M.D., Stopped at 09/16/20 1200    Current Outpatient Medications:  Current Facility-Administered Medications   Medication Dose Route Frequency Provider Last Rate Last Dose   • pantoprazole (PROTONIX) 80 mg in  mL Infusion  8 mg/hr Intravenous Continuous Skinny Pérez M.D. 25 mL/hr at 09/16/20 1040 8 mg/hr at 09/16/20 1040   • hydroxychloroquine (Plaquenil) tablet 200 mg  200 mg Oral Q EVENING Ankit Davidson M.D.       • mycophenolate (MYFORTIC) TBEC 180 mg  180 mg Oral Q EVENING Ankit Davidson M.D.       • senna-docusate (PERICOLACE or SENOKOT S) 8.6-50 MG per tablet 2 Tab  2 Tab Oral BID Ankit Davidson M.D.        And   • polyethylene glycol/lytes (MIRALAX) PACKET 1 Packet  1 Packet Oral QDAY PRN Ankit Davidson M.D.        And   • magnesium hydroxide (MILK OF MAGNESIA) suspension 30 mL  30 mL Oral QDAY PRN Ankit Davidson M.D.         And   • bisacodyl (DULCOLAX) suppository 10 mg  10 mg Rectal QDAY PRN Ankit Davidson M.D.       • acetaminophen (TYLENOL) tablet 650 mg  650 mg Oral Q6HRS PRN Ankit Davidson M.D.   Stopped at 09/16/20 1409   • ondansetron (ZOFRAN) syringe/vial injection 4 mg  4 mg Intravenous Q4HRS PRN Ankit Davidson M.D.       • ondansetron (ZOFRAN ODT) dispertab 4 mg  4 mg Oral Q4HRS PRN Ankit Davidson M.D.       • promethazine (PHENERGAN) tablet 12.5-25 mg  12.5-25 mg Oral Q4HRS PRN Ankit Davidson M.D.       • promethazine (PHENERGAN) suppository 12.5-25 mg  12.5-25 mg Rectal Q4HRS PRN Ankit Davidson M.D.       • prochlorperazine (COMPAZINE) injection 5-10 mg  5-10 mg Intravenous Q4HRS PRN Ankit Davidson M.D.       • pantoprazole (PROTONIX) 80 mg in  mL IVPB  80 mg Intravenous Once Ankit Davidson M.D.   Stopped at 09/16/20 1200   • pantoprazole (PROTONIX) 80 mg in  mL Infusion  8 mg/hr Intravenous Continuous Ankit Davidson M.D.   Stopped at 09/16/20 1200       Medication Allergy:  Allergies   Allergen Reactions   • Clindamycin Rash     Hive   • Keflex Rash     Hives   • Methylprednisolone      Anxious;   • Metoprolol Nausea       Family History:  Family History   Problem Relation Age of Onset   • Diabetes Paternal Grandmother        Social History:  Social History     Socioeconomic History   • Marital status: Single     Spouse name: Not on file   • Number of children: Not on file   • Years of education: Not on file   • Highest education level: Not on file   Occupational History   • Not on file   Social Needs   • Financial resource strain: Not on file   • Food insecurity     Worry: Never true     Inability: Never true   • Transportation needs     Medical: No     Non-medical: No   Tobacco Use   • Smoking status: Never Smoker   • Smokeless tobacco: Never Used   Substance and Sexual Activity   • Alcohol use: No   • Drug use: No   • Sexual activity: Not on file   Lifestyle   • Physical activity     Days per week: Not on file     Minutes per  "session: Not on file   • Stress: Not on file   Relationships   • Social connections     Talks on phone: Not on file     Gets together: Not on file     Attends Christian service: Not on file     Active member of club or organization: Not on file     Attends meetings of clubs or organizations: Not on file     Relationship status: Not on file   • Intimate partner violence     Fear of current or ex partner: Not on file     Emotionally abused: Not on file     Physically abused: Not on file     Forced sexual activity: Not on file   Other Topics Concern   • Not on file   Social History Narrative   • Not on file         Physical Exam:  Vitals/ General Appearance:   Weight/BMI: Body mass index is 20.89 kg/m².  /80   Pulse 88   Temp 36.9 °C (98.5 °F) (Oral)   Resp 18   Ht 1.702 m (5' 7\")   Wt 60.5 kg (133 lb 6.1 oz)   SpO2 97%   Vitals:    09/16/20 0947 09/16/20 1212 09/16/20 1248 09/16/20 1340   BP: 135/83 133/85  122/80   Pulse: 89 (!) 102 87 88   Resp: 16 20 19 18   Temp: 36.7 °C (98 °F)   36.9 °C (98.5 °F)   TempSrc: Temporal   Oral   SpO2:  96% 98% 97%   Weight:    60.5 kg (133 lb 6.1 oz)   Height:    1.702 m (5' 7\")     Oxygen Therapy:  Pulse Oximetry: 97 %, O2 Delivery Device: None - Room Air    Physical Exam   Constitutional: She is oriented to person, place, and time and well-developed, well-nourished, and in no distress. No distress.   HENT:   Head: Normocephalic and atraumatic.   Right Ear: External ear normal.   Left Ear: External ear normal.   Nose: Nose normal.   Mouth/Throat: Oropharynx is clear and moist.   Eyes: Pupils are equal, round, and reactive to light. Conjunctivae are normal. No scleral icterus.   Neck: Neck supple. No JVD present. No thyromegaly present.   Cardiovascular: Normal rate, regular rhythm and intact distal pulses.   Murmur (Grade 2 systolic) heard.  Pulmonary/Chest: Effort normal and breath sounds normal. No respiratory distress. She has no wheezes. She has no rales. "   Abdominal: Soft. Bowel sounds are normal. She exhibits no distension. There is no abdominal tenderness.   Musculoskeletal:         General: No tenderness, deformity or edema.   Lymphadenopathy:     She has no cervical adenopathy.   Neurological: She is alert and oriented to person, place, and time. GCS score is 15.   Skin: Skin is warm and dry. She is not diaphoretic. No erythema. No pallor.   Psychiatric: Mood, memory, affect and judgment normal.       MDM (Data Review):     Records reviewed and summarized in current documentation    Lab Data Review:  Recent Results (from the past 24 hour(s))   CBC WITH DIFFERENTIAL    Collection Time: 09/16/20 10:07 AM   Result Value Ref Range    WBC 3.6 (L) 4.8 - 10.8 K/uL    RBC 2.87 (L) 4.20 - 5.40 M/uL    Hemoglobin 8.0 (L) 12.0 - 16.0 g/dL    Hematocrit 26.0 (L) 37.0 - 47.0 %    MCV 90.6 81.4 - 97.8 fL    MCH 27.9 27.0 - 33.0 pg    MCHC 30.8 (L) 33.6 - 35.0 g/dL    RDW 61.6 (H) 35.9 - 50.0 fL    Platelet Count 72 (L) 164 - 446 K/uL    MPV 11.5 9.0 - 12.9 fL    Neutrophils-Polys 76.80 (H) 44.00 - 72.00 %    Lymphocytes 12.40 (L) 22.00 - 41.00 %    Monocytes 8.30 0.00 - 13.40 %    Eosinophils 0.60 0.00 - 6.90 %    Basophils 0.80 0.00 - 1.80 %    Immature Granulocytes 1.10 (H) 0.00 - 0.90 %    Nucleated RBC 0.00 /100 WBC    Neutrophils (Absolute) 2.79 2.00 - 7.15 K/uL    Lymphs (Absolute) 0.45 (L) 1.00 - 4.80 K/uL    Monos (Absolute) 0.30 0.00 - 0.85 K/uL    Eos (Absolute) 0.02 0.00 - 0.51 K/uL    Baso (Absolute) 0.03 0.00 - 0.12 K/uL    Immature Granulocytes (abs) 0.04 0.00 - 0.11 K/uL    NRBC (Absolute) 0.00 K/uL   COMP METABOLIC PANEL    Collection Time: 09/16/20 10:07 AM   Result Value Ref Range    Sodium 133 (L) 135 - 145 mmol/L    Potassium 3.5 (L) 3.6 - 5.5 mmol/L    Chloride 87 (L) 96 - 112 mmol/L    Co2 37 (H) 20 - 33 mmol/L    Anion Gap 9.0 7.0 - 16.0    Glucose 91 65 - 99 mg/dL    Bun 18 8 - 22 mg/dL    Creatinine 2.92 (H) 0.50 - 1.40 mg/dL    Calcium 8.9 8.4 - 10.2  mg/dL    AST(SGOT) 25 12 - 45 U/L    ALT(SGPT) 10 2 - 50 U/L    Alkaline Phosphatase 67 30 - 99 U/L    Total Bilirubin 0.4 0.1 - 1.5 mg/dL    Albumin 3.8 3.2 - 4.9 g/dL    Total Protein 9.4 (H) 6.0 - 8.2 g/dL    Globulin 5.6 (H) 1.9 - 3.5 g/dL    A-G Ratio 0.7 g/dL   APTT    Collection Time: 09/16/20 10:07 AM   Result Value Ref Range    APTT 26.9 24.7 - 36.0 sec   PROTHROMBIN TIME (INR)    Collection Time: 09/16/20 10:07 AM   Result Value Ref Range    PT 13.5 12.0 - 14.6 sec    INR 1.06 0.87 - 1.13   COD (ADULT)    Collection Time: 09/16/20 10:07 AM   Result Value Ref Range    ABO Grouping Only O     Rh Grouping Only POS     Antibody Screen-Cod NEG     Component R       R99                 Red Cells, LR       [id number]   selected     09/16/20   12:49      Product Type R99     Dispense Status selected     Unit Number (Barcoded) [id number]     Product Code (Barcoded) [id number]     Blood Type (Barcoded) 5100    ESTIMATED GFR    Collection Time: 09/16/20 10:07 AM   Result Value Ref Range    GFR If  24 (A) >60 mL/min/1.73 m 2    GFR If Non African American 20 (A) >60 mL/min/1.73 m 2       Imaging/Procedures Review:    DX-CHEST-2 VIEWS  Narrative: 9/16/2020 10:04 AM    HISTORY/REASON FOR EXAM:  Epigastric pain.    TECHNIQUE/EXAM DESCRIPTION AND NUMBER OF VIEWS:  Two views of the chest.    COMPARISON: None.    FINDINGS:  There is no evidence of focal consolidation or evidence of pulmonary edema.  The heart is normal in size.  There is no evidence of pleural effusion.  There is a right subclavian vascular stent. There are surgical clips within the right upper quadrant.  Impression: No evidence of acute cardiopulmonary process.          MDM (Assessment and Plan):     Patient Active Problem List    Diagnosis Date Noted   • Upper GI bleed 05/29/2020     Priority: High   • Elevated troponin 01/26/2020     Priority: High   • Shingles 07/01/2020     Priority: Medium   • ESRD (end stage renal disease) on  dialysis ([facility]) 10/04/2017     Priority: Medium   • Thrombocytopenia ([facility]) 09/16/2020   • Pancytopenia ([facility]) 07/01/2020   • Gastritis 06/19/2020   • Chest pain 04/22/2020   • Preoperative clearance 04/22/2020   • Elevated brain natriuretic peptide (BNP) level 01/26/2020   • Symptomatic anemia 01/26/2020   • Hyponatremia 01/26/2020   • Hyperkalemia 01/26/2020   • Lupus ([facility]) 12/09/2019   • Hypertension 01/17/2018   • Chronic kidney disease (CKD) stage G5/A1, glomerular filtration rate (GFR) less than or equal to 15 mL/min/1.73 square meter and albuminuria creatinine ratio less than 30 mg/g ([facility]) 08/02/2017   • Arteriovenous fistula, acquired ([facility]) 03/21/2017     Problems:  1.  Hematemesis with black and bloody vomitus-likely sources upper gastrointestinal bleeding.  Differential does include hemorrhagic gastropathy from renal disease which has been a problem in the past, Karen-Keyes tear, ulcer, esophagitis.  2.  Anemia, acute on chronic-related to possible current bleeding in the upper GI tract in addition to chronic renal disease and patient on menstrual cycle.  2.  End-stage renal disease with dialysis Monday, Wednesday, Friday  3.  Lupus with associated nephritis  4.  Pancytopenia, secondary to renal disease and lupus.    Plan:  1.  Esophagogastroduodenoscopy on Friday at 2 PM with Dr. Gadiel Whitney.  She will likely require hemostasis.  The risk and benefits, including but not limited to, bleeding, perforation, infection, respiratory decompensation, cardiovascular decompensation, missed lesions, prolonged hospital stay, death were all discussed with the patient and she verbalizes understanding.  She would like to proceed with the plan of care.    2.  Clear liquid diet    3.  Hold any anticoagulants    4.  Protonix drip, currently still ordered at 8 mg/h    5.  COVID rapid test    6.  Monitor hemoglobin and hematocrit, transfuse if less than 7      Thank your for the opportunity to assist in the care of your  patient.  Please call for any questions or concerns.    JOSE EDUARDO Mcgrath.

## 2020-09-17 LAB
ALBUMIN SERPL BCP-MCNC: 3.1 G/DL (ref 3.2–4.9)
ALBUMIN/GLOB SERPL: 0.6 G/DL
ALP SERPL-CCNC: 48 U/L (ref 30–99)
ALT SERPL-CCNC: 7 U/L (ref 2–50)
ANION GAP SERPL CALC-SCNC: 10 MMOL/L (ref 7–16)
AST SERPL-CCNC: 22 U/L (ref 12–45)
BILIRUB SERPL-MCNC: 0.7 MG/DL (ref 0.1–1.5)
BUN SERPL-MCNC: 29 MG/DL (ref 8–22)
CALCIUM SERPL-MCNC: 8.7 MG/DL (ref 8.4–10.2)
CHLORIDE SERPL-SCNC: 89 MMOL/L (ref 96–112)
CO2 SERPL-SCNC: 32 MMOL/L (ref 20–33)
CREAT SERPL-MCNC: 4.73 MG/DL (ref 0.5–1.4)
GLOBULIN SER CALC-MCNC: 4.9 G/DL (ref 1.9–3.5)
GLUCOSE SERPL-MCNC: 83 MG/DL (ref 65–99)
HGB BLD-MCNC: 7.7 G/DL (ref 12–16)
HGB BLD-MCNC: 7.9 G/DL (ref 12–16)
HGB BLD-MCNC: 8.2 G/DL (ref 12–16)
POTASSIUM SERPL-SCNC: 4.7 MMOL/L (ref 3.6–5.5)
PROT SERPL-MCNC: 8 G/DL (ref 6–8.2)
SARS-COV-2 RNA RESP QL NAA+PROBE: NOTDETECTED
SODIUM SERPL-SCNC: 131 MMOL/L (ref 135–145)
SPECIMEN SOURCE: NORMAL

## 2020-09-17 PROCEDURE — 770020 HCHG ROOM/CARE - TELE (206)

## 2020-09-17 PROCEDURE — 700102 HCHG RX REV CODE 250 W/ 637 OVERRIDE(OP): Performed by: STUDENT IN AN ORGANIZED HEALTH CARE EDUCATION/TRAINING PROGRAM

## 2020-09-17 PROCEDURE — 700105 HCHG RX REV CODE 258: Performed by: INTERNAL MEDICINE

## 2020-09-17 PROCEDURE — 85018 HEMOGLOBIN: CPT | Mod: 91

## 2020-09-17 PROCEDURE — A9270 NON-COVERED ITEM OR SERVICE: HCPCS | Performed by: STUDENT IN AN ORGANIZED HEALTH CARE EDUCATION/TRAINING PROGRAM

## 2020-09-17 PROCEDURE — 99254 IP/OBS CNSLTJ NEW/EST MOD 60: CPT | Performed by: INTERNAL MEDICINE

## 2020-09-17 PROCEDURE — 700111 HCHG RX REV CODE 636 W/ 250 OVERRIDE (IP): Performed by: INTERNAL MEDICINE

## 2020-09-17 PROCEDURE — C9113 INJ PANTOPRAZOLE SODIUM, VIA: HCPCS | Performed by: INTERNAL MEDICINE

## 2020-09-17 PROCEDURE — 700111 HCHG RX REV CODE 636 W/ 250 OVERRIDE (IP): Performed by: STUDENT IN AN ORGANIZED HEALTH CARE EDUCATION/TRAINING PROGRAM

## 2020-09-17 PROCEDURE — 80053 COMPREHEN METABOLIC PANEL: CPT

## 2020-09-17 PROCEDURE — A9270 NON-COVERED ITEM OR SERVICE: HCPCS | Performed by: INTERNAL MEDICINE

## 2020-09-17 PROCEDURE — 700102 HCHG RX REV CODE 250 W/ 637 OVERRIDE(OP): Performed by: INTERNAL MEDICINE

## 2020-09-17 PROCEDURE — 99232 SBSQ HOSP IP/OBS MODERATE 35: CPT | Performed by: STUDENT IN AN ORGANIZED HEALTH CARE EDUCATION/TRAINING PROGRAM

## 2020-09-17 RX ORDER — ATENOLOL 25 MG/1
25 TABLET ORAL
Status: DISCONTINUED | OUTPATIENT
Start: 2020-09-17 | End: 2020-09-19 | Stop reason: HOSPADM

## 2020-09-17 RX ORDER — PREDNISONE 10 MG/1
10 TABLET ORAL DAILY
Status: DISCONTINUED | OUTPATIENT
Start: 2020-09-17 | End: 2020-09-19 | Stop reason: HOSPADM

## 2020-09-17 RX ORDER — AMLODIPINE BESYLATE 5 MG/1
10 TABLET ORAL
Status: DISCONTINUED | OUTPATIENT
Start: 2020-09-17 | End: 2020-09-19 | Stop reason: HOSPADM

## 2020-09-17 RX ADMIN — HYDROXYCHLOROQUINE SULFATE 200 MG: 200 TABLET, FILM COATED ORAL at 17:03

## 2020-09-17 RX ADMIN — PREDNISONE 10 MG: 10 TABLET ORAL at 10:11

## 2020-09-17 RX ADMIN — SODIUM CHLORIDE 8 MG/HR: 9 INJECTION, SOLUTION INTRAVENOUS at 18:33

## 2020-09-17 RX ADMIN — SODIUM CHLORIDE 8 MG/HR: 9 INJECTION, SOLUTION INTRAVENOUS at 08:59

## 2020-09-17 RX ADMIN — ONDANSETRON HYDROCHLORIDE 4 MG: 2 SOLUTION INTRAMUSCULAR; INTRAVENOUS at 08:57

## 2020-09-17 RX ADMIN — MYCOPHENOLIC ACID 180 MG: 180 TABLET, DELAYED RELEASE ORAL at 18:00

## 2020-09-17 RX ADMIN — AMLODIPINE BESYLATE 10 MG: 5 TABLET ORAL at 17:03

## 2020-09-17 RX ADMIN — ATENOLOL 25 MG: 25 TABLET ORAL at 17:01

## 2020-09-17 ASSESSMENT — ENCOUNTER SYMPTOMS
DIARRHEA: 0
NAUSEA: 1
BLOOD IN STOOL: 0
NEUROLOGICAL NEGATIVE: 1
CARDIOVASCULAR NEGATIVE: 1
VOMITING: 0
ABDOMINAL PAIN: 0
CONSTIPATION: 0
CHILLS: 0
EYES NEGATIVE: 1
RESPIRATORY NEGATIVE: 1
PSYCHIATRIC NEGATIVE: 1
CONSTITUTIONAL NEGATIVE: 1
FEVER: 0
MUSCULOSKELETAL NEGATIVE: 1

## 2020-09-17 NOTE — CARE PLAN
Problem: Nutritional:  Goal: Achieve adequate nutritional intake  Description: Advance diet as tolerated past clear liquids. Patient will consume >50% of meals.  Outcome: NOT MET

## 2020-09-17 NOTE — PROGRESS NOTES
Bedside report given to Alvina MARTINEZ. POC discussed. Pt resting comfortably in bed. Safety precautions in place.

## 2020-09-17 NOTE — CARE PLAN
Problem: Communication  Goal: The ability to communicate needs accurately and effectively will improve  Outcome: PROGRESSING AS EXPECTED  Note: Pt will feel comfortable communicating questions and concerns with treatment team. Will continue to foster this relationship.     Problem: Bowel/Gastric:  Goal: Normal bowel function is maintained or improved  Outcome: PROGRESSING AS EXPECTED  Note: PPI ordered. No reports of dark stools or bloody urine noted.

## 2020-09-17 NOTE — PROGRESS NOTES
Telemetry Shift Summary    Rhythm SR  HR Range 75-84  Ectopy none  Measurements 0.18/0.08/0.36        Normal Values  Rhythm SR  HR Range    Measurements 0.12-0.20 / 0.06-0.10  / 0.30-0.52

## 2020-09-17 NOTE — PROGRESS NOTES
Telemetry Shift Summary     Rhythm SR  HR Range 80s  Ectopy none  Measurements 0.20/0.08/0.38           Normal Values  Rhythm SR  HR Range    Measurements 0.12-0.20 / 0.06-0.10  / 0.30-0.52

## 2020-09-17 NOTE — PROGRESS NOTES
Gastroenterology Progress Note     Author: ERICA Mcgraht   Date & Time Created: 9/17/2020 2:12 PM    Chief Complaint:  Hematemesis    History of Present Illness on 9/16:    She is a 24-year-old female patient seen in consultation for hematemesis and anemia.  She is well-known to our practice and does have a significant medical history including lupus, lupus nephritis, end-stage renal disease on dialysis Monday Wednesday and Friday.  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  She also has a history of pancytopenia related to her multiple medical problems.     Today the patient states that she was receiving dialysis and became quickly nauseated.  She vomited x1 with a moderate amount of black and bloody vomitus.  She felt mildly dizzy while this occurred as well.  After vomiting nausea resolved and dizziness resolved as well.  She denies abdominal pain.  She denies NSAID use or alcohol use.  Her hemoglobin today is 8.0, which is 2 points lower than her normal around 10. However, the patient does tell me today that her hemoglobin trends a little bit lower when she is on her menstrual cycle, which she is currently on right now.  She has been taking her omeprazole daily.  CMP today significant for potassium 3.5, chloride 87, CO2 37, creatinine 2.92.  INR today 1.06.    Interval History:  Patient feels overall well today. She has experienced some intermittent nausea. No vomiting or melena. Hgb is stable although she did receive one unit PRBCs yesterday without an increase in Hgb.      Review of Systems:  Review of Systems   Constitutional: Positive for malaise/fatigue.   HENT: Negative.    Eyes: Negative.    Respiratory: Negative.    Cardiovascular: Negative.    Gastrointestinal: Positive for nausea.   Genitourinary: Negative.    Musculoskeletal: Negative.    Skin: Negative.     Neurological: Negative.    Psychiatric/Behavioral: Negative.        Physical Exam:  Physical Exam  Vitals signs and nursing note reviewed.   Constitutional:       Appearance: Normal appearance. She is normal weight.   HENT:      Head: Normocephalic and atraumatic.      Right Ear: External ear normal.      Left Ear: External ear normal.      Nose: Nose normal.      Mouth/Throat:      Mouth: Mucous membranes are moist.      Pharynx: Oropharynx is clear.   Eyes:      Conjunctiva/sclera: Conjunctivae normal.      Pupils: Pupils are equal, round, and reactive to light.   Neck:      Musculoskeletal: Normal range of motion.   Cardiovascular:      Rate and Rhythm: Normal rate and regular rhythm.   Pulmonary:      Effort: Pulmonary effort is normal.      Breath sounds: Normal breath sounds.   Abdominal:      General: Abdomen is flat. Bowel sounds are normal.      Palpations: Abdomen is soft.   Musculoskeletal: Normal range of motion.   Skin:     General: Skin is warm and dry.   Neurological:      General: No focal deficit present.      Mental Status: She is alert.   Psychiatric:         Mood and Affect: Mood normal.         Behavior: Behavior normal.         Thought Content: Thought content normal.         Judgment: Judgment normal.         Labs:          Recent Labs     09/16/20 1007 09/17/20 0442   SODIUM 133* 131*   POTASSIUM 3.5* 4.7   CHLORIDE 87* 89*   CO2 37* 32   BUN 18 29*   CREATININE 2.92* 4.73*   CALCIUM 8.9 8.7     Recent Labs     09/16/20 1007 09/17/20 0442   ALTSGPT 10 7   ASTSGOT 25 22   ALKPHOSPHAT 67 48   TBILIRUBIN 0.4 0.7   GLUCOSE 91 83     Recent Labs     09/16/20 1007 09/16/20  2119 09/17/20  0442 09/17/20  1233   RBC 2.87*  --   --   --    HEMOGLOBIN 8.0* 7.9* 7.7* 7.9*   HEMATOCRIT 26.0*  --   --   --    PLATELETCT 72*  --   --   --    PROTHROMBTM 13.5  --   --   --    APTT 26.9  --   --   --    INR 1.06  --   --   --      Recent Labs     09/16/20 1007 09/17/20 0442   WBC 3.6*  --     NEUTSPOLYS 76.80*  --    LYMPHOCYTES 12.40*  --    MONOCYTES 8.30  --    EOSINOPHILS 0.60  --    BASOPHILS 0.80  --    ASTSGOT 25 22   ALTSGPT 10 7   ALKPHOSPHAT 67 48   TBILIRUBIN 0.4 0.7     Hemodynamics:  Temp (24hrs), Av.8 °C (98.3 °F), Min:36.7 °C (98 °F), Max:36.9 °C (98.5 °F)  Temperature: 36.7 °C (98 °F)  Pulse  Av.9  Min: 76  Max: 102   Blood Pressure: 142/89     Respiratory:    Respiration: 18, Pulse Oximetry: 100 %           Fluids:    Intake/Output Summary (Last 24 hours) at 2020 1412  Last data filed at 2020 1200  Gross per 24 hour   Intake 1516 ml   Output --   Net 1516 ml        GI/Nutrition:  Orders Placed This Encounter   Procedures   • Diet Order Clear Liquid, Renal     Standing Status:   Standing     Number of Occurrences:   1     Order Specific Question:   Diet:     Answer:   Clear Liquid [10]     Order Specific Question:   Diet:     Answer:   Renal [8]     Medical Decision Making, by Problem:  Active Hospital Problems    Diagnosis   • Upper GI bleed [K92.2]   • Lupus ([facility]) [M32.9]   • ESRD (end stage renal disease) on dialysis ([facility]) [N18.6, Z99.2]   • Thrombocytopenia ([facility]) [D69.6]   • Pancytopenia ([facility]) [D61.818]       Problems:  1.  Hematemesis with black and bloody vomitus-likely sources upper gastrointestinal bleeding.  Differential does include hemorrhagic gastropathy from renal disease which has been a problem in the past, Karen-Keyes tear, ulcer, esophagitis. No events overnight.  2.  Anemia, acute on chronic-related to possible current bleeding in the upper GI tract in addition to chronic renal disease and patient on menstrual cycle. Stable today.  2.  End-stage renal disease with dialysis Monday, Wednesday, Friday  3.  Lupus with associated nephritis  4.  Pancytopenia, secondary to renal disease and lupus.     Plan:  1.  Esophagogastroduodenoscopy on  at 2 PM with Dr. Gadiel Whitney.  She will likely require hemostasis.   2.  Clear liquid diet continue.  NPO after midnight  3.  Hold any anticoagulants  4.  Continue Protonix  5.  Monitor hemoglobin and hematocrit, transfuse if less than 7    Quality-Core Measures

## 2020-09-17 NOTE — PROGRESS NOTES
Bedside report received from Christian MARTINEZ. Assumed care. POC discussed. Pt resting comfortably in bed. Safety precautions in place.

## 2020-09-17 NOTE — PROGRESS NOTES
[facility] Medicine Daily Progress Note    Date of Service  9/17/2020    Chief Complaint  24 y.o. female admitted 9/16/2020 with abdominal pain, nausea, and hematemesis    Hospital Course    24F with PMHx of SLE, ESRD on HD (MWF), history of gastritis, esophagitis, and nida thao tear. Patient was receiving scheduled outpatient dialysis on 9/16 when she vomited dark blood. According to [facility] she vomited 50cc bright red blood while in transit. Patient has extensive history of GI bleed with several admissions for endoscopy, with hemorrhagic gastropathy and nida thao tear. She denies any NSAID or EtOH use.    On presentation her hgb was 8 (normal 10). She was admitted for GI bleed an was transfused 1unit PRBC (9/16). GI was consulted, plan for EGD 9/18 and was started on PPI gtt.    Patient was also noted to have pancytopenia and thrombocytopenia (73 from normal 193). This likely due to BM suppression from mycophenolate and CBC was monitored. Her SLE medications were continued.           Interval Problem Update  No acute events overnight. She denies any abdominal pain. Some nausea improved with medications. No further hematemesis. She reports bowel movement last night without melena or hematochezia.    She was able to complete dialysis on Wednesday.    Consultants/Specialty  GI - Dr. Whitney  Nephrology    Code Status  Full Code    Disposition  Pending EGD on 9/18    Review of Systems  Review of Systems   Constitutional: Negative.  Negative for chills and fever.   HENT: Negative.    Eyes: Negative.    Respiratory: Negative.    Cardiovascular: Negative.    Gastrointestinal: Positive for nausea. Negative for abdominal pain, blood in stool, constipation, diarrhea, melena and vomiting.   Genitourinary: Negative.    Musculoskeletal: Negative.    Skin: Negative.    Neurological: Negative.    Psychiatric/Behavioral: Negative.         Physical Exam  Temp:  [36.7 °C (98 °F)-36.9 °C (98.5 °F)] 36.7 °C (98 °F)  Pulse:   [] 80  Resp:  [16-20] 18  BP: (122-149)/(73-98) 142/89  SpO2:  [96 %-100 %] 100 %    Physical Exam  Constitutional:       General: She is not in acute distress.  HENT:      Head: Normocephalic and atraumatic.      Nose: Nose normal.      Mouth/Throat:      Mouth: Mucous membranes are moist.      Pharynx: Oropharynx is clear.   Eyes:      Extraocular Movements: Extraocular movements intact.      Pupils: Pupils are equal, round, and reactive to light.   Cardiovascular:      Rate and Rhythm: Normal rate and regular rhythm.   Pulmonary:      Effort: Pulmonary effort is normal.      Breath sounds: Normal breath sounds.   Abdominal:      General: Abdomen is flat. Bowel sounds are normal. There is no distension.      Palpations: Abdomen is soft.      Tenderness: There is no abdominal tenderness. There is no guarding.   Musculoskeletal: Normal range of motion.         General: No swelling.      Comments: RUE AV fistula with thrill   Skin:     General: Skin is warm and dry.      Capillary Refill: Capillary refill takes less than 2 seconds.   Neurological:      General: No focal deficit present.      Mental Status: She is alert.   Psychiatric:         Mood and Affect: Mood normal.         Behavior: Behavior normal.         Fluids    Intake/Output Summary (Last 24 hours) at 9/17/2020 1123  Last data filed at 9/17/2020 0600  Gross per 24 hour   Intake 1396 ml   Output --   Net 1396 ml       Laboratory  Recent Labs     09/16/20 1007 09/16/20 2119 09/17/20  0442   WBC 3.6*  --   --    RBC 2.87*  --   --    HEMOGLOBIN 8.0* 7.9* 7.7*   HEMATOCRIT 26.0*  --   --    MCV 90.6  --   --    MCH 27.9  --   --    MCHC 30.8*  --   --    RDW 61.6*  --   --    PLATELETCT 72*  --   --    MPV 11.5  --   --      Recent Labs     09/16/20 1007 09/17/20 0442   SODIUM 133* 131*   POTASSIUM 3.5* 4.7   CHLORIDE 87* 89*   CO2 37* 32   GLUCOSE 91 83   BUN 18 29*   CREATININE 2.92* 4.73*   CALCIUM 8.9 8.7     Recent Labs     09/16/20 1007    APTT 26.9   INR 1.06               Imaging  DX-CHEST-2 VIEWS   Final Result      No evidence of acute cardiopulmonary process.           Assessment/Plan  Upper GI bleed- (present on admission)  Assessment & Plan  - History of Karen Keyes Tear, Gastritis and duodenitis, esophagitis from EGD on 5/2020  - Received 1 unit of PRBC, cbc closely  - Goal HGB>7, conservative transfusion strategy to avoid volume overload in ESRD  - Continue pantoprazole infusion  - Remain on CLD  - GI consulted, plan for EGD on Friday    Lupus (HCC)- (present on admission)  Assessment & Plan  - Continue plaquenil and mycophenolate  - Will resume her long standing prednisone to avoid adrenal insufficiency     ESRD (end stage renal disease) on dialysis (HCC)- (present on admission)  Assessment & Plan  - Secondary to lupus nephritis, On HD MWF  - Completed dialysis on Wednesday  - Consult nephrology for planned dialysis on friday    Thrombocytopenia (HCC)- (present on admission)  Assessment & Plan  - Platelet count 72, significant drop from last month 193  - Likely related to mycophenolate, continue to monitor      Pancytopenia (HCC)- (present on admission)  Assessment & Plan  - Patient has history of lupus and on mycophenolate  - Pt reports history of pancytopenia in past  - Continue to monitor CBC         VTE prophylaxis: SCDs, chemoprophylaxis contraindicated for GI bleed.

## 2020-09-17 NOTE — DISCHARGE PLANNING
Outpatient Dialysis Note    Confirmed patient is active at:    [facility] ([place], [place]  [address] [place] [id number]    Schedule: Monday, Wednesday, Friday   Time: 06:00am     Spoke with Leila at facility who confirmed.    Patient is seen by Dr. Trent In HD clinic.    Forwarded records for review.    Mary Han- Dialysis Coordinator  [facility] # [phone]

## 2020-09-17 NOTE — PROGRESS NOTES
Telemetry Shift Summary    Rhythm sr/st  HR Range   Ectopy none  Measurements 0.18/0.08/0.38    Per Ella MT    Normal Values  Rhythm SR  HR Range    Measurements 0.12-0.20 / 0.06-0.10  / 0.30-0.52

## 2020-09-17 NOTE — PROGRESS NOTES
Assessment completed, patient A&Ox4, no c/o pain. Blood administration finished, vitas documented, see flowsheets. Patient left to rest, no other needs at this time.

## 2020-09-17 NOTE — DIETARY
"Nutrition services: Day 1 of admit.  Lily Nicole is a 24 y.o. female with admitting DX of GI Bleed.  History includes Lupus, ESRD on HD.  Consult received for MST score of 2 per nutrition screen for unplanned weight loss of 2-13 lb x 1 month with poor PO intake.    Assessment:  Height: 170.2 cm (5' 7\")  Weight: 60.5 kg (133 lb 6.1 oz)  Body mass index is 20.89 kg/m²., BMI classification: Normal  Diet/Intake: Clear liquid, Renal. PO intake 0% to <25%.    Evaluation:   1. Pt admitted with abdominal pain, nausea and vomiting. Vomited dark blood at dialysis on 9/16.  2. Pt states she lost weight over 2 months. She had a poor appetite and trouble eating. She did start eating in the second month, but did not gain weight. States her usual weight is in the 140 lb range.  3. Per chart review, weight on 8/2/20 = 65 kg. 6/1/20 = 66.9 kg. 6/18/20 = 66.4 kg. Pt with 8.8% weight loss x 2.5 months which is severe.  4. Pt currently on clear liquids pending EGD tomorrow.  5. No skin breakdown noted.    Malnutrition Risk: Pt meets criteria for severe malnutrition in context of chronic illness related to poor appetite in pt with history of Lupus and ESRD on HD as evidenced by reported poor PO intake <75% x >1 month with 8.8% weight loss x 2.5 months.    Recommendations/Plan:  1. Diet advancement per MD.   2. Encourage intake of meals.  3. Consider addition of oral nutrition supplement if PO intake does not improve with advancement of diet.  4. Document intake of all meals as % taken in ADL's to provide interdisciplinary communication across all shifts.   5. Monitor weight.  6. Nutrition rep will continue to see patient for ongoing meal and snack preferences.     RD following.            "

## 2020-09-17 NOTE — CARE PLAN
Problem: Safety  Goal: Will remain free from injury  Outcome: PROGRESSING AS EXPECTED  Note: Pt call light and belongings with in reach, bed in locked and low position, bed rails up x2       Problem: Pain Management  Goal: Pain level will decrease to patient's comfort goal  Outcome: PROGRESSING AS EXPECTED  Note: Patient has no c/o pain throughout shift

## 2020-09-18 ENCOUNTER — ANESTHESIA EVENT (OUTPATIENT)
Dept: SURGERY | Facility: MEDICAL CENTER | Age: 25
DRG: 377 | End: 2020-09-18
Payer: COMMERCIAL

## 2020-09-18 ENCOUNTER — ANESTHESIA (OUTPATIENT)
Dept: SURGERY | Facility: MEDICAL CENTER | Age: 25
DRG: 377 | End: 2020-09-18
Payer: COMMERCIAL

## 2020-09-18 LAB
ANION GAP SERPL CALC-SCNC: 10 MMOL/L (ref 7–16)
BASOPHILS # BLD AUTO: 0.7 % (ref 0–1.8)
BASOPHILS # BLD: 0.02 K/UL (ref 0–0.12)
BUN SERPL-MCNC: 43 MG/DL (ref 8–22)
CALCIUM SERPL-MCNC: 8.8 MG/DL (ref 8.4–10.2)
CHLORIDE SERPL-SCNC: 88 MMOL/L (ref 96–112)
CO2 SERPL-SCNC: 29 MMOL/L (ref 20–33)
CREAT SERPL-MCNC: 6.81 MG/DL (ref 0.5–1.4)
EOSINOPHIL # BLD AUTO: 0.1 K/UL (ref 0–0.51)
EOSINOPHIL NFR BLD: 3.3 % (ref 0–6.9)
ERYTHROCYTE [DISTWIDTH] IN BLOOD BY AUTOMATED COUNT: 59.3 FL (ref 35.9–50)
GLUCOSE SERPL-MCNC: 87 MG/DL (ref 65–99)
HCG SERPL QL: NEGATIVE
HCT VFR BLD AUTO: 23.5 % (ref 37–47)
HGB BLD-MCNC: 7.2 G/DL (ref 12–16)
HGB BLD-MCNC: 7.3 G/DL (ref 12–16)
IMM GRANULOCYTES # BLD AUTO: 0.01 K/UL (ref 0–0.11)
IMM GRANULOCYTES NFR BLD AUTO: 0.3 % (ref 0–0.9)
LYMPHOCYTES # BLD AUTO: 0.72 K/UL (ref 1–4.8)
LYMPHOCYTES NFR BLD: 23.7 % (ref 22–41)
MAGNESIUM SERPL-MCNC: 1.7 MG/DL (ref 1.5–2.5)
MCH RBC QN AUTO: 28.4 PG (ref 27–33)
MCHC RBC AUTO-ENTMCNC: 31.1 G/DL (ref 33.6–35)
MCV RBC AUTO: 91.4 FL (ref 81.4–97.8)
MONOCYTES # BLD AUTO: 0.34 K/UL (ref 0–0.85)
MONOCYTES NFR BLD AUTO: 11.2 % (ref 0–13.4)
NEUTROPHILS # BLD AUTO: 1.85 K/UL (ref 2–7.15)
NEUTROPHILS NFR BLD: 60.8 % (ref 44–72)
NRBC # BLD AUTO: 0 K/UL
NRBC BLD-RTO: 0 /100 WBC
PHOSPHATE SERPL-MCNC: 5.2 MG/DL (ref 2.5–4.5)
PLATELET # BLD AUTO: 71 K/UL (ref 164–446)
PMV BLD AUTO: 11.3 FL (ref 9–12.9)
POTASSIUM SERPL-SCNC: 5 MMOL/L (ref 3.6–5.5)
RBC # BLD AUTO: 2.57 M/UL (ref 4.2–5.4)
SODIUM SERPL-SCNC: 127 MMOL/L (ref 135–145)
WBC # BLD AUTO: 3 K/UL (ref 4.8–10.8)

## 2020-09-18 PROCEDURE — 700111 HCHG RX REV CODE 636 W/ 250 OVERRIDE (IP): Performed by: ANESTHESIOLOGY

## 2020-09-18 PROCEDURE — 770020 HCHG ROOM/CARE - TELE (206)

## 2020-09-18 PROCEDURE — 700105 HCHG RX REV CODE 258: Performed by: INTERNAL MEDICINE

## 2020-09-18 PROCEDURE — 99232 SBSQ HOSP IP/OBS MODERATE 35: CPT | Performed by: STUDENT IN AN ORGANIZED HEALTH CARE EDUCATION/TRAINING PROGRAM

## 2020-09-18 PROCEDURE — A9270 NON-COVERED ITEM OR SERVICE: HCPCS | Performed by: STUDENT IN AN ORGANIZED HEALTH CARE EDUCATION/TRAINING PROGRAM

## 2020-09-18 PROCEDURE — 85025 COMPLETE CBC W/AUTO DIFF WBC: CPT

## 2020-09-18 PROCEDURE — 160009 HCHG ANES TIME/MIN: Performed by: INTERNAL MEDICINE

## 2020-09-18 PROCEDURE — 160048 HCHG OR STATISTICAL LEVEL 1-5: Performed by: INTERNAL MEDICINE

## 2020-09-18 PROCEDURE — 0W3P8ZZ CONTROL BLEEDING IN GASTROINTESTINAL TRACT, VIA NATURAL OR ARTIFICIAL OPENING ENDOSCOPIC: ICD-10-PCS | Performed by: INTERNAL MEDICINE

## 2020-09-18 PROCEDURE — 84100 ASSAY OF PHOSPHORUS: CPT

## 2020-09-18 PROCEDURE — 700111 HCHG RX REV CODE 636 W/ 250 OVERRIDE (IP): Performed by: INTERNAL MEDICINE

## 2020-09-18 PROCEDURE — 85018 HEMOGLOBIN: CPT

## 2020-09-18 PROCEDURE — A9270 NON-COVERED ITEM OR SERVICE: HCPCS | Performed by: INTERNAL MEDICINE

## 2020-09-18 PROCEDURE — 83735 ASSAY OF MAGNESIUM: CPT

## 2020-09-18 PROCEDURE — 160036 HCHG PACU - EA ADDL 30 MINS PHASE I: Performed by: INTERNAL MEDICINE

## 2020-09-18 PROCEDURE — 84703 CHORIONIC GONADOTROPIN ASSAY: CPT

## 2020-09-18 PROCEDURE — 80048 BASIC METABOLIC PNL TOTAL CA: CPT

## 2020-09-18 PROCEDURE — 90935 HEMODIALYSIS ONE EVALUATION: CPT

## 2020-09-18 PROCEDURE — 5A1D70Z PERFORMANCE OF URINARY FILTRATION, INTERMITTENT, LESS THAN 6 HOURS PER DAY: ICD-10-PCS | Performed by: INTERNAL MEDICINE

## 2020-09-18 PROCEDURE — 90935 HEMODIALYSIS ONE EVALUATION: CPT | Performed by: INTERNAL MEDICINE

## 2020-09-18 PROCEDURE — 3E0G8GC INTRODUCTION OF OTHER THERAPEUTIC SUBSTANCE INTO UPPER GI, VIA NATURAL OR ARTIFICIAL OPENING ENDOSCOPIC: ICD-10-PCS | Performed by: INTERNAL MEDICINE

## 2020-09-18 PROCEDURE — C9113 INJ PANTOPRAZOLE SODIUM, VIA: HCPCS | Performed by: INTERNAL MEDICINE

## 2020-09-18 PROCEDURE — 700111 HCHG RX REV CODE 636 W/ 250 OVERRIDE (IP): Performed by: STUDENT IN AN ORGANIZED HEALTH CARE EDUCATION/TRAINING PROGRAM

## 2020-09-18 PROCEDURE — 500066 HCHG BITE BLOCK, ECT: Performed by: INTERNAL MEDICINE

## 2020-09-18 PROCEDURE — 160002 HCHG RECOVERY MINUTES (STAT): Performed by: INTERNAL MEDICINE

## 2020-09-18 PROCEDURE — 160202 HCHG ENDO MINUTES - 1ST 30 MINS LEVEL 3: Performed by: INTERNAL MEDICINE

## 2020-09-18 PROCEDURE — 700101 HCHG RX REV CODE 250: Performed by: ANESTHESIOLOGY

## 2020-09-18 PROCEDURE — 160207 HCHG ENDO MINUTES - EA ADDL 1 MIN LEVEL 3: Performed by: INTERNAL MEDICINE

## 2020-09-18 PROCEDURE — 700102 HCHG RX REV CODE 250 W/ 637 OVERRIDE(OP): Performed by: INTERNAL MEDICINE

## 2020-09-18 PROCEDURE — 700102 HCHG RX REV CODE 250 W/ 637 OVERRIDE(OP): Performed by: STUDENT IN AN ORGANIZED HEALTH CARE EDUCATION/TRAINING PROGRAM

## 2020-09-18 PROCEDURE — 160035 HCHG PACU - 1ST 60 MINS PHASE I: Performed by: INTERNAL MEDICINE

## 2020-09-18 PROCEDURE — 503369 HCHG GOLDPROBE, 7 FR: Performed by: INTERNAL MEDICINE

## 2020-09-18 RX ORDER — ATENOLOL 25 MG/1
25 TABLET ORAL ONCE
Status: COMPLETED | OUTPATIENT
Start: 2020-09-18 | End: 2020-09-18

## 2020-09-18 RX ORDER — OXYCODONE HCL 5 MG/5 ML
10 SOLUTION, ORAL ORAL
Status: DISCONTINUED | OUTPATIENT
Start: 2020-09-18 | End: 2020-09-18 | Stop reason: HOSPADM

## 2020-09-18 RX ORDER — ONDANSETRON 2 MG/ML
4 INJECTION INTRAMUSCULAR; INTRAVENOUS
Status: COMPLETED | OUTPATIENT
Start: 2020-09-18 | End: 2020-09-18

## 2020-09-18 RX ORDER — ROCURONIUM BROMIDE 10 MG/ML
INJECTION, SOLUTION INTRAVENOUS PRN
Status: DISCONTINUED | OUTPATIENT
Start: 2020-09-18 | End: 2020-09-18 | Stop reason: SURG

## 2020-09-18 RX ORDER — MEPERIDINE HYDROCHLORIDE 25 MG/ML
12.5 INJECTION INTRAMUSCULAR; INTRAVENOUS; SUBCUTANEOUS
Status: DISCONTINUED | OUTPATIENT
Start: 2020-09-18 | End: 2020-09-18 | Stop reason: HOSPADM

## 2020-09-18 RX ORDER — HYDRALAZINE HYDROCHLORIDE 20 MG/ML
5 INJECTION INTRAMUSCULAR; INTRAVENOUS
Status: DISCONTINUED | OUTPATIENT
Start: 2020-09-18 | End: 2020-09-18 | Stop reason: HOSPADM

## 2020-09-18 RX ORDER — HALOPERIDOL 5 MG/ML
1 INJECTION INTRAMUSCULAR
Status: DISCONTINUED | OUTPATIENT
Start: 2020-09-18 | End: 2020-09-18 | Stop reason: HOSPADM

## 2020-09-18 RX ORDER — METOPROLOL TARTRATE 1 MG/ML
1 INJECTION, SOLUTION INTRAVENOUS
Status: DISCONTINUED | OUTPATIENT
Start: 2020-09-18 | End: 2020-09-18 | Stop reason: HOSPADM

## 2020-09-18 RX ORDER — DEXAMETHASONE SODIUM PHOSPHATE 4 MG/ML
INJECTION, SOLUTION INTRA-ARTICULAR; INTRALESIONAL; INTRAMUSCULAR; INTRAVENOUS; SOFT TISSUE PRN
Status: DISCONTINUED | OUTPATIENT
Start: 2020-09-18 | End: 2020-09-18 | Stop reason: SURG

## 2020-09-18 RX ORDER — OXYCODONE HCL 5 MG/5 ML
5 SOLUTION, ORAL ORAL
Status: DISCONTINUED | OUTPATIENT
Start: 2020-09-18 | End: 2020-09-18 | Stop reason: HOSPADM

## 2020-09-18 RX ORDER — AMLODIPINE BESYLATE 5 MG/1
10 TABLET ORAL ONCE
Status: COMPLETED | OUTPATIENT
Start: 2020-09-18 | End: 2020-09-18

## 2020-09-18 RX ORDER — HYDROMORPHONE HYDROCHLORIDE 1 MG/ML
0.1 INJECTION, SOLUTION INTRAMUSCULAR; INTRAVENOUS; SUBCUTANEOUS
Status: DISCONTINUED | OUTPATIENT
Start: 2020-09-18 | End: 2020-09-18 | Stop reason: HOSPADM

## 2020-09-18 RX ORDER — SODIUM CHLORIDE, SODIUM LACTATE, POTASSIUM CHLORIDE, CALCIUM CHLORIDE 600; 310; 30; 20 MG/100ML; MG/100ML; MG/100ML; MG/100ML
INJECTION, SOLUTION INTRAVENOUS CONTINUOUS
Status: DISCONTINUED | OUTPATIENT
Start: 2020-09-18 | End: 2020-09-18 | Stop reason: HOSPADM

## 2020-09-18 RX ORDER — SUCRALFATE ORAL 1 G/10ML
1 SUSPENSION ORAL
Status: DISCONTINUED | OUTPATIENT
Start: 2020-09-18 | End: 2020-09-19 | Stop reason: HOSPADM

## 2020-09-18 RX ORDER — HYDROMORPHONE HYDROCHLORIDE 1 MG/ML
0.2 INJECTION, SOLUTION INTRAMUSCULAR; INTRAVENOUS; SUBCUTANEOUS
Status: DISCONTINUED | OUTPATIENT
Start: 2020-09-18 | End: 2020-09-18 | Stop reason: HOSPADM

## 2020-09-18 RX ORDER — OMEPRAZOLE 20 MG/1
20 CAPSULE, DELAYED RELEASE ORAL 2 TIMES DAILY
Status: DISCONTINUED | OUTPATIENT
Start: 2020-09-18 | End: 2020-09-19 | Stop reason: HOSPADM

## 2020-09-18 RX ORDER — DIPHENHYDRAMINE HYDROCHLORIDE 50 MG/ML
12.5 INJECTION INTRAMUSCULAR; INTRAVENOUS
Status: DISCONTINUED | OUTPATIENT
Start: 2020-09-18 | End: 2020-09-18 | Stop reason: HOSPADM

## 2020-09-18 RX ORDER — MIDAZOLAM HYDROCHLORIDE 1 MG/ML
1 INJECTION INTRAMUSCULAR; INTRAVENOUS
Status: DISCONTINUED | OUTPATIENT
Start: 2020-09-18 | End: 2020-09-18 | Stop reason: HOSPADM

## 2020-09-18 RX ORDER — HYDROMORPHONE HYDROCHLORIDE 1 MG/ML
0.4 INJECTION, SOLUTION INTRAMUSCULAR; INTRAVENOUS; SUBCUTANEOUS
Status: DISCONTINUED | OUTPATIENT
Start: 2020-09-18 | End: 2020-09-18 | Stop reason: HOSPADM

## 2020-09-18 RX ADMIN — PROPOFOL 120 MG: 10 INJECTION, EMULSION INTRAVENOUS at 13:27

## 2020-09-18 RX ADMIN — PROPOFOL 200 MCG/KG/MIN: 10 INJECTION, EMULSION INTRAVENOUS at 13:27

## 2020-09-18 RX ADMIN — ONDANSETRON HYDROCHLORIDE 4 MG: 2 SOLUTION INTRAMUSCULAR; INTRAVENOUS at 13:27

## 2020-09-18 RX ADMIN — POVIDONE IODINE 15 ML: 100 SOLUTION TOPICAL at 13:03

## 2020-09-18 RX ADMIN — MYCOPHENOLIC ACID 180 MG: 180 TABLET, DELAYED RELEASE ORAL at 18:00

## 2020-09-18 RX ADMIN — ONDANSETRON HYDROCHLORIDE 4 MG: 2 SOLUTION INTRAMUSCULAR; INTRAVENOUS at 21:37

## 2020-09-18 RX ADMIN — SODIUM CHLORIDE: 9 INJECTION, SOLUTION INTRAVENOUS at 13:15

## 2020-09-18 RX ADMIN — ATENOLOL 25 MG: 25 TABLET ORAL at 17:03

## 2020-09-18 RX ADMIN — AMLODIPINE BESYLATE 10 MG: 5 TABLET ORAL at 17:02

## 2020-09-18 RX ADMIN — FENTANYL CITRATE 25 MCG: 50 INJECTION, SOLUTION INTRAMUSCULAR; INTRAVENOUS at 14:08

## 2020-09-18 RX ADMIN — FENTANYL CITRATE 25 MCG: 50 INJECTION, SOLUTION INTRAMUSCULAR; INTRAVENOUS at 14:05

## 2020-09-18 RX ADMIN — DEXAMETHASONE SODIUM PHOSPHATE 4 MG: 4 INJECTION, SOLUTION INTRAMUSCULAR; INTRAVENOUS at 13:27

## 2020-09-18 RX ADMIN — ONDANSETRON 4 MG: 2 INJECTION INTRAMUSCULAR; INTRAVENOUS at 14:00

## 2020-09-18 RX ADMIN — SUGAMMADEX 200 MG: 100 INJECTION, SOLUTION INTRAVENOUS at 13:49

## 2020-09-18 RX ADMIN — ACETAMINOPHEN 650 MG: 325 TABLET, FILM COATED ORAL at 22:49

## 2020-09-18 RX ADMIN — HYDROXYCHLOROQUINE SULFATE 200 MG: 200 TABLET, FILM COATED ORAL at 17:01

## 2020-09-18 RX ADMIN — ROCURONIUM BROMIDE 20 MG: 10 INJECTION, SOLUTION INTRAVENOUS at 13:27

## 2020-09-18 RX ADMIN — SUCRALFATE 1 G: 1 SUSPENSION ORAL at 20:23

## 2020-09-18 RX ADMIN — OMEPRAZOLE 20 MG: 20 CAPSULE, DELAYED RELEASE ORAL at 17:02

## 2020-09-18 RX ADMIN — SUCRALFATE 1 G: 1 SUSPENSION ORAL at 17:04

## 2020-09-18 RX ADMIN — PREDNISONE 10 MG: 10 TABLET ORAL at 06:15

## 2020-09-18 RX ADMIN — ACETAMINOPHEN 650 MG: 325 TABLET, FILM COATED ORAL at 00:39

## 2020-09-18 RX ADMIN — ONDANSETRON HYDROCHLORIDE 4 MG: 2 SOLUTION INTRAMUSCULAR; INTRAVENOUS at 09:23

## 2020-09-18 RX ADMIN — MIDAZOLAM HYDROCHLORIDE 1 MG: 1 INJECTION, SOLUTION INTRAMUSCULAR; INTRAVENOUS at 13:27

## 2020-09-18 RX ADMIN — SODIUM CHLORIDE 8 MG/HR: 9 INJECTION, SOLUTION INTRAVENOUS at 06:15

## 2020-09-18 RX ADMIN — FENTANYL CITRATE 50 MCG: 50 INJECTION, SOLUTION INTRAMUSCULAR; INTRAVENOUS at 13:27

## 2020-09-18 ASSESSMENT — ENCOUNTER SYMPTOMS
VOMITING: 0
RESPIRATORY NEGATIVE: 1
CONSTITUTIONAL NEGATIVE: 1
MUSCULOSKELETAL NEGATIVE: 1
EYES NEGATIVE: 1
CHILLS: 0
ABDOMINAL PAIN: 0
NAUSEA: 0
PSYCHIATRIC NEGATIVE: 1
FEVER: 0
CARDIOVASCULAR NEGATIVE: 1
NEUROLOGICAL NEGATIVE: 1
DIARRHEA: 0
BLOOD IN STOOL: 0
CONSTIPATION: 0

## 2020-09-18 ASSESSMENT — PAIN DESCRIPTION - PAIN TYPE
TYPE: ACUTE PAIN

## 2020-09-18 ASSESSMENT — PAIN SCALES - GENERAL: PAIN_LEVEL: 2

## 2020-09-18 NOTE — PROGRESS NOTES
[facility] Medicine Daily Progress Note    Date of Service  9/18/2020    Chief Complaint  24 y.o. female admitted 9/16/2020 with abdominal pain, nausea, and hematemesis.    Hospital Course    24F with PMHx of SLE, ESRD on HD (MWF), history of gastritis, esophagitis, and nida thao tear. Patient was receiving scheduled outpatient dialysis on 9/16 when she vomited dark blood. According to [facility] she vomited 50cc bright red blood while in transit. Patient has extensive history of GI bleed with several admissions for endoscopy, with hemorrhagic gastropathy and nida thao tear. She denies any NSAID or EtOH use.    On presentation her hgb was 8 (normal 10). She was admitted for GI bleed an was transfused 1unit PRBC (9/16). GI was consulted, plan for EGD 9/18 and was started on PPI gtt. EGD demonstrated gastropathy with 3 sites of oozing in the cardia and 1 site in the antrum s/p hemostasis with epinephrine and bicap. GI recommends PPI BID and Carafate 4 times daily for 2 weeks.    Patient was also noted to have pancytopenia and thrombocytopenia (73 from normal 193). This likely due to BM suppression from mycophenolate and CBC was monitored. Her SLE medications were continued.       Interval Problem Update    Patient felt well overnight. No acute events. No further vomiting and no evidence of melena or hematochezia.    Patient underwent EGD and dialysis today.    Consultants/Specialty  GI - Dr. Whitney  Nephrology - Dr. Trent    Code Status  Full Code    Disposition  Pending EGD on 9/18    Review of Systems  Review of Systems   Constitutional: Negative.  Negative for chills and fever.   HENT: Negative.    Eyes: Negative.    Respiratory: Negative.    Cardiovascular: Negative.    Gastrointestinal: Negative for abdominal pain, blood in stool, constipation, diarrhea, melena, nausea and vomiting.   Genitourinary: Negative.    Musculoskeletal: Negative.    Skin: Negative.    Neurological: Negative.    Psychiatric/Behavioral:  Negative.         Physical Exam  Temp:  [36.2 °C (97.2 °F)-36.8 °C (98.3 °F)] 36.8 °C (98.2 °F)  Pulse:  [] 100  Resp:  [14-18] 16  BP: (125-167)/() 142/99  SpO2:  [93 %-100 %] 95 %    Physical Exam  Constitutional:       General: She is not in acute distress.  HENT:      Head: Normocephalic and atraumatic.      Nose: Nose normal.      Mouth/Throat:      Mouth: Mucous membranes are moist.      Pharynx: Oropharynx is clear.   Eyes:      Extraocular Movements: Extraocular movements intact.      Pupils: Pupils are equal, round, and reactive to light.   Cardiovascular:      Rate and Rhythm: Normal rate and regular rhythm.   Pulmonary:      Effort: Pulmonary effort is normal.      Breath sounds: Normal breath sounds.   Abdominal:      General: Abdomen is flat. Bowel sounds are normal. There is no distension.      Palpations: Abdomen is soft.      Tenderness: There is no abdominal tenderness. There is no guarding.   Musculoskeletal: Normal range of motion.         General: No swelling.      Comments: RUE AV fistula with thrill   Skin:     General: Skin is warm and dry.      Capillary Refill: Capillary refill takes less than 2 seconds.   Neurological:      General: No focal deficit present.      Mental Status: She is alert.   Psychiatric:         Mood and Affect: Mood normal.         Behavior: Behavior normal.         Fluids    Intake/Output Summary (Last 24 hours) at 9/18/2020 1507  Last data filed at 9/18/2020 1354  Gross per 24 hour   Intake 1143.38 ml   Output 3500 ml   Net -2356.62 ml       Laboratory  Recent Labs     09/16/20  1007  09/17/20  1233 09/17/20 2024 09/18/20  0428   WBC 3.6*  --   --   --  3.0*   RBC 2.87*  --   --   --  2.57*   HEMOGLOBIN 8.0*   < > 7.9* 8.2* 7.3*  7.2*   HEMATOCRIT 26.0*  --   --   --  23.5*   MCV 90.6  --   --   --  91.4   MCH 27.9  --   --   --  28.4   MCHC 30.8*  --   --   --  31.1*   RDW 61.6*  --   --   --  59.3*   PLATELETCT 72*  --   --   --  71*   MPV 11.5  --   --    --  11.3    < > = values in this interval not displayed.     Recent Labs     09/16/20  1007 09/17/20  0442 09/18/20  0428   SODIUM 133* 131* 127*   POTASSIUM 3.5* 4.7 5.0   CHLORIDE 87* 89* 88*   CO2 37* 32 29   GLUCOSE 91 83 87   BUN 18 29* 43*   CREATININE 2.92* 4.73* 6.81*   CALCIUM 8.9 8.7 8.8     Recent Labs     09/16/20  1007   APTT 26.9   INR 1.06               Imaging  DX-CHEST-2 VIEWS   Final Result      No evidence of acute cardiopulmonary process.           Assessment/Plan  Upper GI bleed- (present on admission)  Assessment & Plan  - History of Karen Keyes Tear, Gastritis and duodenitis, esophagitis from EGD on 5/2020  - EGD 9/18: gastropathy with 3 sites oozing in cardia and 1 site in the antrum s/p hemostasis with epinephrine and bicap. Will start PPI BID and Carafate per GI recommendations  - Received 1 unit of PRBC on admission. Hgb trended down this morning. Will repeat in AM prior to discharge  - Goal HGB>7, conservative transfusion strategy to avoid volume overload in ESRD  - Advance diet to renal    Lupus (HCC)- (present on admission)  Assessment & Plan  - Continue home plaquenil, mycophenolate, and prednisone    ESRD (end stage renal disease) on dialysis (HCC)- (present on admission)  Assessment & Plan  - Secondary to lupus nephritis, On HD MWF  - Patient underwent dialysis today, continue HD as scheduled    Thrombocytopenia (HCC)- (present on admission)  Assessment & Plan  - Platelet count 72, significant drop from last month 193. Stable during admission  - Likely related to mycophenolate, continue to monitor. Follow up with PCP      Pancytopenia (HCC)- (present on admission)  Assessment & Plan  - Patient has history of lupus and on mycophenolate  - Pt reports history of pancytopenia in past  - Plts currently stable, continue to monitor CBC       VTE prophylaxis: SCDs, chemoprophylaxis contraindicated for GI bleed.

## 2020-09-18 NOTE — ANESTHESIA QCDR
2019 Qualified Clinical Data Registry (for Quality Improvement)     Postoperative nausea/vomiting risk protocol (Adult = 18 yrs and Pediatric 3-17 yrs)- (430 and 463)  General inhalation anesthetic (NOT TIVA) with PONV risk factors: No  Provision of anti-emetic therapy with at least 2 different classes of agents: N/A  Patient DID NOT receive anti-emetic therapy and reason is documented in Medical Record: N/A    Multimodal Pain Management- (477)  Non-emergent surgery AND patient age >= 18: Yes  Use of Multimodal Pain Management, two or more drugs and/or interventions, NOT including systemic opioids: No  Exception: Documented allergy to multiple classes of analgesics: No       Smoking Abstinence (404)  Patient is current smoker (cigarette, pipe, e-cig, marijuanna): No  Elective Surgery:   Abstinence instructions provided prior to day of surgery:   Patient abstained from smoking on day of surgery:     Pre-Op Beta-Blocker in Isolated CABG (44)  Isolated CABG AND patient age >= 18: No  Beta-blocker admin within 24 hours of surgical incision:   Exception:of medical reason(s) for not administering beta blocker within 24 hours prior to surgical incision (e.g., not  indicated,other medical reason):     PACU assessment of acute postoperative pain prior to Anesthesia Care End- Applies to Patients Age = 18- (ABG7)  Initial PACU pain score is which of the following: < 7/10  Patient unable to report pain score: N/A    Post-anesthetic transfer of care checklist/protocol to PACU/ICU- (426 and 427)  Upon conclusion of case, patient transferred to which of the following locations: PACU/Non-ICU  Use of transfer checklist/protocol: Yes  Exclusion: Service Performed in Patient Hospital Room (and thus did not require transfer): N/A  Unplanned admission to ICU related to anesthesia service up through end of PACU care- (MD51)  Unplanned admission to ICU (not initially anticipated at anesthesia start time):

## 2020-09-18 NOTE — ANESTHESIA TIME REPORT
Anesthesia Start and Stop Event Times     Date Time Event    9/18/2020 1314 Ready for Procedure        Responsible Staff    No responsible staff documented.       Preop Diagnosis (Free Text):  Pre-op Diagnosis     Hematemesis and anemia        Preop Diagnosis (Codes):    Post op Diagnosis  Hematemesis   Hematemesis and anemia    Premium Reason  Non-Premium    Comments:

## 2020-09-18 NOTE — PROGRESS NOTES
[facility] Services Progress Note     Hemodialysis treatment ordered today per Dr. Trent x 3 hours.   Treatment initiated at 0921.      Patient tolerated tx; see paper flow sheet for details.      Net UF 3,000 mL.      Needles removed from access site. Dressings applied and sites held x 10 minutes; verified no bleeding. Positive bruit/thrill post tx. Staff RN to monitor AVF for breakthrough bleeding. Should breakthrough bleeding occur, staff RN to apply pressure to access sites until bleeding resolved. Notify Dialysis and Nephrologist for follow-up.     Report given to Primary Nurse, VENU Casper RN.

## 2020-09-18 NOTE — ANESTHESIA PREPROCEDURE EVALUATION
Relevant Problems   CARDIAC   (+) Arteriovenous fistula, acquired (HCC)   (+) Hypertension         (+) Chronic kidney disease (CKD) stage G5/A1, glomerular filtration rate (GFR) less than or equal to 15 mL/min/1.73 square meter and albuminuria creatinine ratio less than 30 mg/g (HCC)   (+) ESRD (end stage renal disease) on dialysis ([facility])       Physical Exam    Airway   Mallampati: II  TM distance: >3 FB  Neck ROM: full       Cardiovascular - normal exam  Rhythm: regular  Rate: normal  (-) murmur     Dental - normal exam           Pulmonary - normal exam  Breath sounds clear to auscultation     Abdominal    Neurological - normal exam         Other findings: Over bite, decreased TMJopening            Anesthesia Plan    ASA 3   ASA physical status 3 criteria: ESRD undergoing regularly scheduled dialysis    Plan - general       Airway plan will be ETT        Induction: intravenous    Postoperative Plan: Postoperative administration of opioids is intended.    Pertinent diagnostic labs and testing reviewed    Informed Consent:    Anesthetic plan and risks discussed with patient.    Use of blood products discussed with: patient whom consented to blood products.

## 2020-09-18 NOTE — PROGRESS NOTES
Bedside report received from Alvina MARTINEZ. Assumed care. POC discussed. Pt resting comfortably in bed. Safety precautions in place.

## 2020-09-18 NOTE — ANESTHESIA POSTPROCEDURE EVALUATION
Patient: Lily Nicole    Procedure Summary     Date: 09/18/20 Room / Location:  ENDOSCOPIC ULTRASOUND ROOM / SURGERY [facility]    Anesthesia Start: 1315 Anesthesia Stop: 1355    Procedure: GASTROSCOPY (Abdomen) Diagnosis: (Hematemesis and anemia)    Surgeon: Gadiel Whitney M.D. Responsible Provider: Mauro Rawls M.D.    Anesthesia Type: general ASA Status: 3          Final Anesthesia Type: general  Last vitals  BP   Blood Pressure: 142/93    Temp   36.2 °C (97.2 °F)    Pulse   Pulse: 84   Resp   16    SpO2   98 %      Anesthesia Post Evaluation    Patient location during evaluation: PACU  Patient participation: complete - patient participated  Level of consciousness: awake and alert  Pain score: 2    Airway patency: patent  Anesthetic complications: no  Cardiovascular status: hemodynamically stable  Respiratory status: acceptable  Hydration status: euvolemic    PONV: none           Nurse Pain Score: 0 (NPRS)

## 2020-09-18 NOTE — PROGRESS NOTES
Telemetry Shift Summary    Rhythm SR  HR Range 67-80  Ectopy none  Measurements 0.18/0.08/0.40        Normal Values  Rhythm SR  HR Range    Measurements 0.12-0.20 / 0.06-0.10  / 0.30-0.52

## 2020-09-18 NOTE — PROGRESS NOTES
Nephrology/Hemodialysis note    Patient with ESRD/HD admitted with GIB    Seen and examined during dialysis -tolerates well  VS stable  Lab results reviewed  Please see dialysis flow sheet for details

## 2020-09-18 NOTE — ANESTHESIA PROCEDURE NOTES
Airway    Date/Time: 9/18/2020 1:21 PM  Performed by: Mauro Rawls M.D.  Authorized by: Mauro Rawls M.D.     Location:  OR  Urgency:  Elective  Indications for Airway Management:  Anesthesia      Spontaneous Ventilation: absent    Sedation Level:  Deep  Preoxygenated: Yes    Patient Position:  Sniffing  Final Airway Type:  Endotracheal airway  Final Endotracheal Airway:  ETT  Cuffed: Yes    Technique Used for Successful ETT Placement:  Video laryngoscopy    Insertion Site:  Oral  Blade Type:  Nathalia  Laryngoscope Blade/Videolaryngoscope Blade Size:  3  ETT Size (mm):  7.0  Measured from:  Lips  ETT to Lips (cm):  22  Placement Verified by: auscultation and capnometry    Cormack-Lehane Classification:  Grade I - full view of glottis  Number of Attempts at Approach:  1

## 2020-09-18 NOTE — CONSULTS
DATE OF SERVICE:  09/17/2020    NEPHROLOGY CONSULTATION    REQUESTING PHYSICIAN:  Cameron Goode MD    REASON FOR CONSULTATION:  To evaluate and provide dialysis for patient with   end-stage renal disease.    HISTORY OF PRESENT ILLNESS:  The patient is a 24-year-old female patient of   mine with end-stage renal disease secondary to lupus nephritis who has been   receiving her dialysis treatments in [facility] on Monday,   Wednesday, and Friday.  During completion of dialysis on Wednesday, started   vomiting, noticed coffee-ground hematemesis.  At this point, patient was   admitted to the hospital for gastrointestinal bleeding evaluation.  Currently,   has no complaints, states doing well.  No nausea, vomiting or diarrhea.    REVIEW OF SYSTEMS:  GENERAL:  Positive for fatigue.  No fever or chills.  HEENT:  No nasal congestion, no nosebleeds, no sore throat.  NECK:  No pain, no stiffness.  RESPIRATORY:  No cough, no hemoptysis, no shortness of breath.  CARDIOVASCULAR:  No edema, no chest pain, no shortness of breath, no dyspnea.  GASTROINTESTINAL:  No nausea, vomiting, or diarrhea.  All other systems reviewed, all negative.    PAST MEDICAL HISTORY:  Lupus nephritis, end-stage renal disease, on   hemodialysis; hypertension, anemia, gastrointestinal bleeding.    PAST SURGICAL HISTORY:  Endoscopy, cholecystectomy, AV fistula creation.    FAMILY HISTORY:  No history of kidney disease.    SOCIAL HISTORY:  No tobacco, alcohol or drug use.    ALLERGIES:  ALLERGIC TO CLINDAMYCIN, KEFLEX, METHYLPREDNISOLONE, METOPROLOL.    PHYSICAL EXAMINATION:  VITAL SIGNS:  Blood pressure 135/83, pulse 89, temperature 36.7 Celsius.  GENERAL APPEARANCE:  Well-developed, well-nourished female in no acute   distress.  HEENT:  Normocephalic, atraumatic.  Pupils equal, round, reactive to light.    Extraocular movement intact.  Nares patent.  Oropharynx clear, moist mucosa.    No erythema or exudate.  NECK:  Supple, no  lymphadenopathy, no thyromegaly appreciated.  LUNGS:  Clear to auscultation bilaterally.  No rales, wheezes, no rhonchi.  HEART:  Regular rate.  No rub or gallop.  ABDOMEN:  Soft, nontender, and nondistended.  Bowel sounds present.  EXTREMITIES:  No cyanosis, no clubbing, no edema.  AV fistula with good   thrill.  NEUROLOGIC:  Alert and oriented x3, no focal deficit.  Cranial nerves II-XII   grossly intact.    LABORATORY RESULTS:  Hemoglobin level 7.7.  Sodium 131, potassium 4.7, BUN 29,   and creatinine 4.7.    ASSESSMENT AND PLAN:  The patient is a very pleasant 24-year-old female with   end-stage renal disease, on hemodialysis, admitted with gastrointestinal   bleeding.  1.  End-stage renal disease.  We will continue dialysis per patient's schedule   Monday, Wednesday, Friday.  We will dialyze tomorrow.  2.  Electrolytes, noticed mild hyponatremia, to monitor.  Avoid hypotonic   solutions.  3.  Hypertension.  Blood pressure is well controlled.  4.  Volume.  We will attempt ultrafiltration with hemodialysis as blood   pressure tolerates.  5.  Anemia, drop in hemoglobin level due to gastrointestinal bleeding.  6.  Gastrointestinal bleeding.  Plan for endoscopy tomorrow.    RECOMMENDATIONS:  Hemodialysis Monday, Wednesday, and Friday.  To monitor   hemoglobin level, basic metabolic panel, provide renal diet.  All medications   adjust to renal doses.    Thank you for the consult.       ____________________________________     MD ROSANNA MOREIRA / MADHU    DD:  09/17/2020 17:50:21  DT:  09/17/2020 21:14:36    D#:  [id number]  Job#:  [id number]

## 2020-09-18 NOTE — CARE PLAN
Problem: Communication  Goal: The ability to communicate needs accurately and effectively will improve  Outcome: PROGRESSING AS EXPECTED  Note: Pt will feel comfortable communicating questions and concerns with treatment team. Will continue to foster this relationship.      Problem: Safety  Goal: Will remain free from injury  Outcome: PROGRESSING AS EXPECTED  Note: Call light and personal items within reach.

## 2020-09-18 NOTE — OR SURGEON
Immediate Post OP Note    PreOp Diagnosis: Hematemsis    PostOp Diagnosis: 1) Bleeding in stomach - active from 4 separate sites - oozing slowly from gastropathy    Procedure(s):  GASTROSCOPY - Wound Class: None    Surgeon(s):  Gadiel Whitney M.D.    Anesthesiologist/Type of Anesthesia:  Anesthesiologist: Mauro Rawls M.D./* No anesthesia type entered *    Surgical Staff:  Circulator: Renan Mcbride R.N.  Endoscopy Technician: Lizett Gloria    Specimens removed if any:  * No specimens in log *    Estimated Blood Loss: Minimal    Findings: 1) Gastropathy - 3 sites of oozing in cardia and 1 site in antrum - Tx with epi and bicap    Complications: None    Will sign off - Recommend long term treatment with Omeprazole twice daily before breakfast and dinner and carafate qid solution for 2 weeks.    Will sign off. Please call with additional questions. WIll arrange follow up with Gi consultants    EMO        9/18/2020 1:50 PM Gadiel Whitney M.D.

## 2020-09-19 VITALS
DIASTOLIC BLOOD PRESSURE: 94 MMHG | SYSTOLIC BLOOD PRESSURE: 134 MMHG | BODY MASS INDEX: 20.93 KG/M2 | HEIGHT: 67 IN | OXYGEN SATURATION: 100 % | HEART RATE: 81 BPM | TEMPERATURE: 98.5 F | RESPIRATION RATE: 18 BRPM | WEIGHT: 133.38 LBS

## 2020-09-19 PROBLEM — K92.2 UPPER GI BLEED: Status: RESOLVED | Noted: 2020-05-29 | Resolved: 2020-09-19

## 2020-09-19 LAB
BASOPHILS # BLD AUTO: 0.8 % (ref 0–1.8)
BASOPHILS # BLD: 0.04 K/UL (ref 0–0.12)
EOSINOPHIL # BLD AUTO: 0.02 K/UL (ref 0–0.51)
EOSINOPHIL NFR BLD: 0.4 % (ref 0–6.9)
ERYTHROCYTE [DISTWIDTH] IN BLOOD BY AUTOMATED COUNT: 60.2 FL (ref 35.9–50)
HCT VFR BLD AUTO: 28.7 % (ref 37–47)
HGB BLD-MCNC: 8.8 G/DL (ref 12–16)
IMM GRANULOCYTES # BLD AUTO: 0.02 K/UL (ref 0–0.11)
IMM GRANULOCYTES NFR BLD AUTO: 0.4 % (ref 0–0.9)
LYMPHOCYTES # BLD AUTO: 0.81 K/UL (ref 1–4.8)
LYMPHOCYTES NFR BLD: 16.9 % (ref 22–41)
MCH RBC QN AUTO: 28.4 PG (ref 27–33)
MCHC RBC AUTO-ENTMCNC: 30.7 G/DL (ref 33.6–35)
MCV RBC AUTO: 92.6 FL (ref 81.4–97.8)
MONOCYTES # BLD AUTO: 0.41 K/UL (ref 0–0.85)
MONOCYTES NFR BLD AUTO: 8.6 % (ref 0–13.4)
NEUTROPHILS # BLD AUTO: 3.49 K/UL (ref 2–7.15)
NEUTROPHILS NFR BLD: 72.9 % (ref 44–72)
NRBC # BLD AUTO: 0 K/UL
NRBC BLD-RTO: 0 /100 WBC
PLATELET # BLD AUTO: 86 K/UL (ref 164–446)
PMV BLD AUTO: 11 FL (ref 9–12.9)
RBC # BLD AUTO: 3.1 M/UL (ref 4.2–5.4)
WBC # BLD AUTO: 4.8 K/UL (ref 4.8–10.8)

## 2020-09-19 PROCEDURE — 700102 HCHG RX REV CODE 250 W/ 637 OVERRIDE(OP): Performed by: STUDENT IN AN ORGANIZED HEALTH CARE EDUCATION/TRAINING PROGRAM

## 2020-09-19 PROCEDURE — A9270 NON-COVERED ITEM OR SERVICE: HCPCS | Performed by: STUDENT IN AN ORGANIZED HEALTH CARE EDUCATION/TRAINING PROGRAM

## 2020-09-19 PROCEDURE — A9270 NON-COVERED ITEM OR SERVICE: HCPCS | Performed by: INTERNAL MEDICINE

## 2020-09-19 PROCEDURE — 700111 HCHG RX REV CODE 636 W/ 250 OVERRIDE (IP): Performed by: STUDENT IN AN ORGANIZED HEALTH CARE EDUCATION/TRAINING PROGRAM

## 2020-09-19 PROCEDURE — 99239 HOSP IP/OBS DSCHRG MGMT >30: CPT | Performed by: STUDENT IN AN ORGANIZED HEALTH CARE EDUCATION/TRAINING PROGRAM

## 2020-09-19 PROCEDURE — 85025 COMPLETE CBC W/AUTO DIFF WBC: CPT

## 2020-09-19 PROCEDURE — 700102 HCHG RX REV CODE 250 W/ 637 OVERRIDE(OP): Performed by: INTERNAL MEDICINE

## 2020-09-19 RX ORDER — OMEPRAZOLE 20 MG/1
20 CAPSULE, DELAYED RELEASE ORAL 2 TIMES DAILY
Qty: 60 CAP | Refills: 1 | Status: ON HOLD | OUTPATIENT
Start: 2020-09-19 | End: 2020-11-12 | Stop reason: SDUPTHER

## 2020-09-19 RX ORDER — SUCRALFATE ORAL 1 G/10ML
1 SUSPENSION ORAL
Qty: 414 ML | Refills: 1 | Status: ON HOLD | OUTPATIENT
Start: 2020-09-19 | End: 2020-11-12 | Stop reason: SDUPTHER

## 2020-09-19 RX ADMIN — OMEPRAZOLE 20 MG: 20 CAPSULE, DELAYED RELEASE ORAL at 05:56

## 2020-09-19 RX ADMIN — AMLODIPINE BESYLATE 10 MG: 5 TABLET ORAL at 05:56

## 2020-09-19 RX ADMIN — ATENOLOL 25 MG: 25 TABLET ORAL at 05:56

## 2020-09-19 RX ADMIN — PREDNISONE 10 MG: 10 TABLET ORAL at 05:56

## 2020-09-19 RX ADMIN — SUCRALFATE 1 G: 1 SUSPENSION ORAL at 05:57

## 2020-09-19 NOTE — DISCHARGE INSTRUCTIONS
Discharge Instructions    Discharged to home by car with relative. Discharged via wheelchair, hospital escort: Yes.  Special equipment needed: Not Applicable    Be sure to schedule a follow-up appointment with your primary care doctor or any specialists as instructed.     Discharge Plan:   Diet Plan: Discussed  Activity Level: Discussed  Confirmed Follow up Appointment: Patient to Call and Schedule Appointment  Confirmed Symptoms Management: Discussed  Medication Reconciliation Updated: Yes    I understand that a diet low in cholesterol, fat, and sodium is recommended for good health. Unless I have been given specific instructions below for another diet, I accept this instruction as my diet prescription.   Other diet: Regular    Special Instructions: None    · Is patient discharged on Warfarin / Coumadin?   No     Depression / Suicide Risk    As you are discharged from this [facility] facility, it is important to learn how to keep safe from harming yourself.    Recognize the warning signs:  · Abrupt changes in personality, positive or negative- including increase in energy   · Giving away possessions  · Change in eating patterns- significant weight changes-  positive or negative  · Change in sleeping patterns- unable to sleep or sleeping all the time   · Unwillingness or inability to communicate  · Depression  · Unusual sadness, discouragement and loneliness  · Talk of wanting to die  · Neglect of personal appearance   · Rebelliousness- reckless behavior  · Withdrawal from people/activities they love  · Confusion- inability to concentrate     If you or a loved one observes any of these behaviors or has concerns about self-harm, here's what you can do:  · Talk about it- your feelings and reasons for harming yourself  · Remove any means that you might use to hurt yourself (examples: pills, rope, extension cords, firearm)  · Get professional help from the community (Mental Health, Substance Abuse, psychological  counseling)  · Do not be alone:Call your Safe Contact- someone whom you trust who will be there for you.  · Call your local CRISIS HOTLINE 633-4089 or 081-539-3714  · Call your local Children's Mobile Crisis Response Team [place] (710) 560-3189 or www.[facility]  · Call the toll free National Suicide Prevention Hotlines   · National Suicide Prevention Lifeline 587-518-ZGJE (0705)  · [facility] Hope Line Network 800-SUICIDE (934-5324)        Gastrointestinal Bleeding  Gastrointestinal (GI) bleeding is bleeding somewhere along the digestive tract, between the mouth and the anus. This tract includes the mouth, esophagus, stomach, small intestine, large intestine, and anus. The large intestine is often called the colon.  GI bleeding can be caused by various problems. The severity of these problems can range from mild to serious or even life-threatening. If you have GI bleeding, you may find blood in your stools (feces), you may have black stools, or you may vomit blood. If there is a lot of bleeding, you may need to stay in the hospital.  What are the causes?  This condition may be caused by:  · Inflammation, irritation, or swelling of the esophagus (esophagitis). The esophagus is part of the body that moves food from your mouth to your stomach.  · Swollen veins in the rectum (hemorrhoids).  · Areas of painful tearing in the anus that are often caused by passing hard stool (anal fissures).  · Pouches that form on the colon over time, with age, and may bleed a lot (diverticulosis).  · Inflammation (diverticulitis) in areas with diverticulosis. This can cause pain, fever, and bloody stools, although bleeding may be mild.  · Growths (polyps) or cancer. Colon cancer often starts out as precancerous polyps.  · Gastritis and ulcers. With these, bleeding may come from the upper GI tract, near the stomach.  What increases the risk?  You are more likely to develop this condition if you:  · Have an infection in your  stomach from a type of bacteria called Helicobacter pylori.  · Take certain medicines, such as:  ? NSAIDs.  ? Aspirin.  ? Selective serotonin reuptake inhibitors (SSRIs).  ? Steroids.  ? Antiplatelet or anticoagulant medicines.  · Smoke.  · Drink alcohol.  What are the signs or symptoms?  Common symptoms of this condition include:  · Bright red blood in your vomit, or vomit that looks like coffee grounds.  · Bloody, black, or tarry stools.  ? Bleeding from the lower GI tract will usually cause red or maroon blood in the stools.  ? Bleeding from the upper GI tract may cause black, tarry stools that are often stronger smelling than usual.  ? In certain cases, if the bleeding is fast enough, the stools may be red.  · Pain or cramping in the abdomen.  How is this diagnosed?  This condition may be diagnosed based on:  · Your medical history and a physical exam.  · Various tests, such as:  ? Blood tests.  ? Stool tests.  ? X-rays and other imaging tests.  ? Esophagogastroduodenoscopy (EGD). In this test, a flexible, lighted tube is used to look at your esophagus, stomach, and small intestine.  ? Colonoscopy. In this test, a flexible, lighted tube is used to look at your colon.  How is this treated?  Treatment for this condition depends on the cause of the bleeding. For example:  · For bleeding from the esophagus, stomach, small intestine, or colon, the health care provider doing your EGD or colonoscopy may be able to stop the bleeding as part of the procedure.  · Inflammation or infection of the colon can be treated with medicines.  · Certain rectal problems can be treated with creams, suppositories, or warm baths.  · Medicines may be given to reduce acid in your stomach.  · Surgery is sometimes needed.  · Blood transfusions are sometimes needed if a lot of blood has been lost.  If bleeding is mild, you may be allowed to go home. If there is a lot of bleeding, you will need to stay in the hospital for observation.  Follow  these instructions at home:    · Take over-the-counter and prescription medicines only as told by your health care provider.  · Eat foods that are high in fiber, such as beans, whole grains, and fresh fruits and vegetables. This will help to keep your stools soft. Eating 1-3 prunes each day works well for many people.  · Drink enough fluid to keep your urine pale yellow.  · Keep all follow-up visits as told by your health care provider. This is important.  Contact a health care provider if:  · Your symptoms do not improve.  Get help right away if:  · Your bleeding does not stop.  · You feel light-headed or you faint.  · You feel weak.  · You have severe cramps in your back or abdomen.  · You pass large blood clots in your stool.  · Your symptoms are getting worse.  · You have chest pain or fast heartbeats.  Summary  · Gastrointestinal (GI) bleeding is bleeding somewhere along the digestive tract, between the mouth and anus. GI bleeding can be caused by various problems. The severity of these problems can range from mild to serious or even life-threatening.  · Treatment for this condition depends on the cause of the bleeding.  · Take over-the-counter and prescription medicines only as told by your health care provider.  · Keep all follow-up visits as told by your health care provider. This is important.  · Get help right away if your bleeding increases, your symptoms are getting worse, or you have new symptoms.  This information is not intended to replace advice given to you by your health care provider. Make sure you discuss any questions you have with your health care provider.  Document Released: 12/15/2001 Document Revised: 07/31/2019 Document Reviewed: 07/31/2019  Elsevier Patient Education © 2020 [employer]    Sucralfate oral suspension  What is this medicine?  SUCRALFATE (FRANCISCO justin fate) helps to treat ulcers of the intestine.  This medicine may be used for other purposes; ask your health care provider or  pharmacist if you have questions.  COMMON BRAND NAME(S): Carafate  What should I tell my health care provider before I take this medicine?  They need to know if you have any of these conditions:  · kidney disease  · an unusual or allergic reaction to sucralfate, other medicines, foods, dyes, or preservatives  · pregnant or trying to get pregnant  · breast-feeding  How should I use this medicine?  Take this medicine by mouth with a glass of water. Follow the directions on the prescription label. Shake well before using. Use a specially marked spoon or container to measure your medicine. Ask your pharmacist if you do not have one. Household spoons are not accurate. This medicine works best if you take it on an empty stomach, 1 hour before meals. Take your doses at regular intervals. Do not take your medicine more often than directed. Do not stop taking except on your doctor's advice.  Talk to your pediatrician regarding the use of this medicine in children. Special care may be needed.  Overdosage: If you think you have taken too much of this medicine contact a poison control center or emergency room at once.  NOTE: This medicine is only for you. Do not share this medicine with others.  What if I miss a dose?  If you miss a dose, take it as soon as you can. If it is almost time for your next dose, take only that dose. Do not take double or extra doses.  What may interact with this medicine?  · antacid  · cimetidine  · digoxin  · ketoconazole  · phenytoin  · quinidine  · ranitidine  · some antibiotics like ciprofloxacin, norfloxacin, and ofloxacin  · theophylline  · thyroid hormones  · warfarin  This list may not describe all possible interactions. Give your health care provider a list of all the medicines, herbs, non-prescription drugs, or dietary supplements you use. Also tell them if you smoke, drink alcohol, or use illegal drugs. Some items may interact with your medicine.  What should I watch for while using this  medicine?  Visit your doctor or health care professional for regular check ups. Let your doctor know if your symptoms do not improve or if you feel worse.  Antacids should not be taken within one half hour before or after this medicine.  What side effects may I notice from receiving this medicine?  Side effects that you should report to your doctor or health care professional as soon as possible:  · allergic reactions like skin rash, itching or hives, swelling of the face, lips, or tongue  · difficulty breathing  Side effects that usually do not require medical attention (report to your doctor or health care professional if they continue or are bothersome):  · back pain  · constipation  · drowsy, dizzy  · dry mouth  · headache  · stomach upset, gas  · trouble sleeping  This list may not describe all possible side effects. Call your doctor for medical advice about side effects. You may report side effects to FDA at 2-913-FDA-8297.  Where should I keep my medicine?  Keep out of the reach of children.  Store at room temperature between 20 and 25 degrees C (68 and 77 degrees F). Keep container tightly closed. Throw away any unused medicine after the expiration date.  NOTE: This sheet is a summary. It may not cover all possible information. If you have questions about this medicine, talk to your doctor, pharmacist, or health care provider.  © 2020 Elsevier/Gold Standard (2009-08-19 15:47:18)

## 2020-09-19 NOTE — PROGRESS NOTES
Telemetry Shift Summary    Rhythm SR  HR Range 60s-90s  Ectopy none  Measurements 0.16/0.10/0.40        Normal Values  Rhythm SR  HR Range    Measurements 0.12-0.20 / 0.06-0.10  / 0.30-0.52

## 2020-09-19 NOTE — PROGRESS NOTES
Telemetry Shift Summary    Rhythm SR  HR Range 61  Ectopy none  Measurements 0.16/00.14/0.42    Per Miguelina MT    Normal Values  Rhythm SR  HR Range    Measurements 0.12-0.20 / 0.06-0.10  / 0.30-0.52

## 2020-09-19 NOTE — PROGRESS NOTES
Report received, assumed care. Pt sleeping, unlabored breathing observed. Labs and vital signs reviewed. Safety measures and hourly rounding in place.

## 2020-09-19 NOTE — PROGRESS NOTES
Report received from Anne MARTINEZ. Plan of care discussed. Patient resting comfortably in bed, respirations are even and unlabored. Safety precautions in place.

## 2020-09-19 NOTE — CARE PLAN
Problem: Knowledge Deficit  Goal: Knowledge of disease process/condition, treatment plan, diagnostic tests, and medications will improve  Outcome: PROGRESSING AS EXPECTED   Discussed plan of care with patient including assessment and discharge instructions. Allowed time for questions, patient agreed and verbalized understanding.     Problem: Discharge Barriers/Planning  Goal: Patient's continuum of care needs will be met  Outcome: PROGRESSING AS EXPECTED  Patient is discharging today.

## 2020-09-19 NOTE — PROGRESS NOTES
Pt discharged to home. Discharge instructions provided to pt. Pt verbalizes understanding. Pt states all questions have been answered. Signed copy in chart. Prescriptions sent to [facility]. Pt states that all personal belongings are in possession. Pt off unit via wheelchair, escorted by antelmo Mayo.

## 2020-09-19 NOTE — PROGRESS NOTES
Report given to Anne MARTINEZ. POC discussed. Pt resting comfortably in bed. Safety precautions in place.

## 2020-09-19 NOTE — PROGRESS NOTES
0800 MD rounded at bedside, patient will discharge home.    1000 Assessment completed, patient is alert and oriented x 4, patient denies any pain or discomfort at this time. Patient informed of discharge orders and will call her mother to pick her up at 1100.

## 2020-09-19 NOTE — PROGRESS NOTES
Pt reports nausea and lower abd cramping due to period. Zofran given per MAR. Pt requests IV pain med because she is too nauseated to swallow tylenol. Pt educated that there is no IV pain medication ordered for her. Heat pack given for comfort and patient agrees to tylenol once nausea resolves.

## 2020-09-19 NOTE — PROGRESS NOTES
Report received from Alvina MARTINEZ. POC discussed. Pt resting comfortably in bed. Safety precautions in place.

## 2020-09-19 NOTE — DISCHARGE SUMMARY
Discharge Summary    CHIEF COMPLAINT ON ADMISSION  Chief Complaint   Patient presents with   • Abdominal Pain   • Nausea   • Vomiting     blood dark red       Reason for Admission  Hematemesis    Admission Date  9/16/2020    CODE STATUS  Full Code    HPI & HOSPITAL COURSE      24F with PMHx of SLE, ESRD on HD (MWF), history of gastritis, esophagitis, and nida thao tear. Patient was receiving scheduled outpatient dialysis on 9/16 when she vomited dark blood. According to [facility] she vomited 50cc bright red blood while in transit. Patient has extensive history of GI bleed with several admissions for endoscopy, with hemorrhagic gastropathy and nida thao tear. She denies any NSAID or EtOH use.    On presentation her hgb was 8 (normal 10). She was admitted for GI bleed an was transfused 1unit PRBC (9/16). GI was consulted, and underwent EGD 9/18 and was started on PPI gtt. EGD demonstrated gastropathy with 3 sites of oozing in the cardia and 1 site in the antrum s/p hemostasis with epinephrine and bicap. GI recommends PPI BID and Carafate 4 times daily for 2 weeks.    Patient was also noted to have pancytopenia and thrombocytopenia (73 from normal 193). This likely due to BM suppression from mycophenolate and CBC was monitored. On discharge her CBC showed improvement (Hgb 8.8, Plts 86). Thrombocytopenia and anemia should be followed by her PCP. Her SLE medications were continued.      Therefore, she is discharged in good and stable condition to home with close outpatient follow-up.    The patient met 2-midnight criteria for an inpatient stay at the time of discharge.    Discharge Date  9/19/2020    FOLLOW UP ITEMS POST DISCHARGE  Follow up patient's pancytopenia especially her thrombocytopenia.    DISCHARGE DIAGNOSES  Principal Problem (Resolved):    Upper GI bleed POA: Yes  Active Problems:    ESRD (end stage renal disease) on dialysis (HCC) POA: Yes    Lupus (HCC) POA: Yes    Pancytopenia (HCC) POA: Yes     Thrombocytopenia (HCC) POA: Yes      FOLLOW UP  No future appointments.  Ella Matthew M.D.  [address]  [address]  [place] [address]  [phone]    In 1 week      Gadiel Whitney M.D.  [address]  [place] [address]  [phone]            MEDICATIONS ON DISCHARGE     Medication List      START taking these medications      Instructions   sucralfate 1 GM/10ML Susp  Commonly known as: CARAFATE   Take 10 mL by mouth 4 Times a Day,Before Meals and at Bedtime.  Dose: 1 g        CHANGE how you take these medications      Instructions   omeprazole 20 MG delayed-release capsule  What changed: when to take this  Commonly known as: PRILOSEC   Take 1 Cap by mouth 2 times a day.  Dose: 20 mg        CONTINUE taking these medications      Instructions   acetaminophen 500 MG Tabs  Commonly known as: TYLENOL   Take 500 mg by mouth every 6 hours as needed for Moderate Pain.  Dose: 500 mg     amLODIPine 10 MG Tabs  Commonly known as: NORVASC   Take 10 mg by mouth every evening.  Dose: 10 mg     atenolol 25 MG Tabs  Commonly known as: TENORMIN   Take 25 mg by mouth every evening.  Dose: 25 mg     hydroxychloroquine 200 MG Tabs  Commonly known as: Plaquenil   Take 200 mg by mouth every evening.  Dose: 200 mg     mycophenolate 180 MG EC tablet  Commonly known as: Myfortic   Take 1 Tab by mouth every evening.  Dose: 180 mg            Allergies  Allergies   Allergen Reactions   • Clindamycin Rash     Hive   • Keflex Rash     Hives   • Methylprednisolone      Anxious;   • Metoprolol Nausea       DIET  Orders Placed This Encounter   Procedures   • Diet Order Renal     Standing Status:   Standing     Number of Occurrences:   1     Order Specific Question:   Diet:     Answer:   Renal [8]       ACTIVITY  As tolerated.  Weight bearing as tolerated    CONSULTATIONS  GI - Dr. Whitney  Nephrology - Dr. Trent    PROCEDURES  9/18 - EGD with hemostasis of oozing from gastropathy    LABORATORY  Lab Results   Component Value Date    SODIUM 127 (L)  09/18/2020    POTASSIUM 5.0 09/18/2020    CHLORIDE 88 (L) 09/18/2020    CO2 29 09/18/2020    GLUCOSE 87 09/18/2020    BUN 43 (H) 09/18/2020    CREATININE 6.81 (HH) 09/18/2020        Lab Results   Component Value Date    WBC 4.8 09/19/2020    HEMOGLOBIN 8.8 (L) 09/19/2020    HEMATOCRIT 28.7 (L) 09/19/2020    PLATELETCT 86 (L) 09/19/2020        Total time of the discharge process exceeds 30 minutes.

## 2020-09-19 NOTE — PROCEDURES
DATE OF SERVICE:  09/18/2020    This is an EGD with bleeding control report.    ENDOSCOPIST:  Gadiel Whitney MD    INDICATION:  Hematemesis.      MEDICATIONS:  Patient received general endotracheal anesthesia by Dr. Rawls.    Please see anesthesia flow sheets for details.    PROCEDURE:  Patient was informed of the indications as well as the risks, the   benefits, the alternatives of the procedure in detail and she indicates   understanding and would like to proceed.  Consent was signed and placed on the   chart.  After patient was deemed adequately sedated, a standard Olympus   flexible gastroscope was lubricated and gently placed through a bite block   over the top of her tongue down into her esophagus.  From here, the scope was   carefully maneuvered through the esophagus into the stomach and a small amount   of blood clot was noted.  Air was insufflated.  The scope was then traversed   through the body of the stomach through the pylorus into the duodenum and the   farthest reach of the endoscope was second portion of the duodenum.    Photographs were obtained throughout.  No abnormalities were noted within the   duodenum.  The scope was withdrawn back up into the stomach and a thorough   inspection including in retroflexion position revealed evidence of 4 separate   slowly oozing sites amongst general gastritis or gastropathy throughout the   stomach.  Once I was in the antrum and this was treated with epinephrine   injection 1:10,000 3 separate injections and BICAP probe, in the cardia 3   separate additional sites were also treated with BICAP probe.  After   treatment, none of these sites were continuing to bleed.  Air was suctioned   out of the stomach, the scope withdrawn back into the esophagus and no   additional findings were noted as it was normal.  Scope was withdrawn, the   procedure terminated.    FINDINGS:  1.  Gastritis/gastropathy with oozing of blood in 4 separate sites, treated.  2.  Sign of  recent bleeding with blood clot in the stomach.    COMPLICATIONS:  None.    TISSUE/BIOPSIES:  None.    THERAPY:  Bleeding control achieved with both epinephrine and BICAP   electrocautery probe.    IMPRESSION/PLAN/MEDICAL DECISION MAKIN.  Hematemesis.  Certainly I have to suspect the cause of the hematemesis was   indeed these bleeding sites that we witnessed.  I do not see any clear sign   of gastric antral vascular ectasia, although these bleeding sites seemed to   behave much like that, although their location certainly would be unusual.  At   any rate, no further bleeding after therapy applied.  We will start patient   on q.i.d. Carafate and continue twice daily oral PPIs.  We will go ahead and   sign off at this time.  You can advance her diet.  I have already written for   renal diet and I will arrange followup with us as an outpatient.  2.  Anemia, acute blood loss.  3.  Gastritis with hemorrhage.  4.  End-stage renal disease.  5.  Lupus.       ____________________________________     MD MAR MCMULLEN / MADHU    DD:  2020 13:59:52  DT:  2020 03:41:18    D#:  [id number]  Job#:  [id number]

## 2020-10-12 ENCOUNTER — TELEPHONE (OUTPATIENT)
Dept: CARDIOLOGY | Facility: MEDICAL CENTER | Age: 25
End: 2020-10-12

## 2020-10-12 NOTE — TELEPHONE ENCOUNTER
VR    Called regarding pt who is on a list for kidney transplant and they want to make sure pt has had all cardiac studies completed. Please call them back at     Update: I started this message and the person hung up while they were on hold. I figured they would call back. They never called us back. ALK

## 2020-10-13 ENCOUNTER — HOSPITAL ENCOUNTER (OUTPATIENT)
Facility: MEDICAL CENTER | Age: 25
End: 2020-10-14
Attending: EMERGENCY MEDICINE | Admitting: HOSPITALIST
Payer: COMMERCIAL

## 2020-10-13 PROBLEM — D63.1 ANEMIA DUE TO CHRONIC KIDNEY DISEASE: Status: ACTIVE | Noted: 2020-10-13

## 2020-10-13 PROBLEM — N18.9 ANEMIA DUE TO CHRONIC KIDNEY DISEASE: Status: ACTIVE | Noted: 2020-10-13

## 2020-10-13 LAB
ABO GROUP BLD: NORMAL
ANION GAP SERPL CALC-SCNC: 12 MMOL/L (ref 7–16)
BARCODED ABORH UBTYP: 5100
BARCODED ABORH UBTYP: 5100
BARCODED PRD CODE UBPRD: NORMAL
BARCODED PRD CODE UBPRD: NORMAL
BARCODED UNIT NUM UBUNT: NORMAL
BARCODED UNIT NUM UBUNT: NORMAL
BASOPHILS # BLD AUTO: 1 % (ref 0–1.8)
BASOPHILS # BLD: 0.04 K/UL (ref 0–0.12)
BLD GP AB SCN SERPL QL: NORMAL
BUN SERPL-MCNC: 52 MG/DL (ref 8–22)
CALCIUM SERPL-MCNC: 8.7 MG/DL (ref 8.4–10.2)
CHLORIDE SERPL-SCNC: 94 MMOL/L (ref 96–112)
CO2 SERPL-SCNC: 27 MMOL/L (ref 20–33)
COMMENT 1642: NORMAL
COMPONENT R 8504R: NORMAL
COMPONENT R 8504R: NORMAL
COVID ORDER STATUS COVID19: NORMAL
CREAT SERPL-MCNC: 6.92 MG/DL (ref 0.5–1.4)
EOSINOPHIL # BLD AUTO: 0.01 K/UL (ref 0–0.51)
EOSINOPHIL NFR BLD: 0.3 % (ref 0–6.9)
ERYTHROCYTE [DISTWIDTH] IN BLOOD BY AUTOMATED COUNT: 65.5 FL (ref 35.9–50)
GLUCOSE SERPL-MCNC: 95 MG/DL (ref 65–99)
HCT VFR BLD AUTO: 17.8 % (ref 37–47)
HGB BLD-MCNC: 5.2 G/DL (ref 12–16)
HGB RETIC QN AUTO: 27.4 PG/CELL (ref 29–35)
IMM GRANULOCYTES # BLD AUTO: 0.02 K/UL (ref 0–0.11)
IMM GRANULOCYTES NFR BLD AUTO: 0.5 % (ref 0–0.9)
IMM RETICS NFR: 15.5 % (ref 9.3–17.4)
LYMPHOCYTES # BLD AUTO: 0.81 K/UL (ref 1–4.8)
LYMPHOCYTES NFR BLD: 20.4 % (ref 22–41)
MAGNESIUM SERPL-MCNC: 1.7 MG/DL (ref 1.5–2.5)
MCH RBC QN AUTO: 28.2 PG (ref 27–33)
MCHC RBC AUTO-ENTMCNC: 28.8 G/DL (ref 33.6–35)
MCV RBC AUTO: 97.8 FL (ref 81.4–97.8)
MONOCYTES # BLD AUTO: 0.3 K/UL (ref 0–0.85)
MONOCYTES NFR BLD AUTO: 7.6 % (ref 0–13.4)
NEUTROPHILS # BLD AUTO: 2.79 K/UL (ref 2–7.15)
NEUTROPHILS NFR BLD: 70.2 % (ref 44–72)
NRBC # BLD AUTO: 0 K/UL
NRBC BLD-RTO: 0 /100 WBC
PHOSPHATE SERPL-MCNC: 3.6 MG/DL (ref 2.5–4.5)
PLATELET # BLD AUTO: 100 K/UL (ref 164–446)
PMV BLD AUTO: 10.4 FL (ref 9–12.9)
POTASSIUM SERPL-SCNC: 4.3 MMOL/L (ref 3.6–5.5)
PRODUCT TYPE UPROD: NORMAL
PRODUCT TYPE UPROD: NORMAL
RBC # BLD AUTO: 1.81 M/UL (ref 4.2–5.4)
RETICS # AUTO: 0.14 M/UL (ref 0.04–0.06)
RETICS/RBC NFR: 7.7 % (ref 0.8–2.1)
RH BLD: NORMAL
SODIUM SERPL-SCNC: 133 MMOL/L (ref 135–145)
UNIT STATUS USTAT: NORMAL
UNIT STATUS USTAT: NORMAL
WBC # BLD AUTO: 4 K/UL (ref 4.8–10.8)

## 2020-10-13 PROCEDURE — 85046 RETICYTE/HGB CONCENTRATE: CPT

## 2020-10-13 PROCEDURE — 82728 ASSAY OF FERRITIN: CPT

## 2020-10-13 PROCEDURE — 84100 ASSAY OF PHOSPHORUS: CPT

## 2020-10-13 PROCEDURE — 83540 ASSAY OF IRON: CPT

## 2020-10-13 PROCEDURE — 700111 HCHG RX REV CODE 636 W/ 250 OVERRIDE (IP): Performed by: HOSPITALIST

## 2020-10-13 PROCEDURE — G0378 HOSPITAL OBSERVATION PER HR: HCPCS

## 2020-10-13 PROCEDURE — 86900 BLOOD TYPING SEROLOGIC ABO: CPT

## 2020-10-13 PROCEDURE — 86850 RBC ANTIBODY SCREEN: CPT

## 2020-10-13 PROCEDURE — A9270 NON-COVERED ITEM OR SERVICE: HCPCS | Performed by: HOSPITALIST

## 2020-10-13 PROCEDURE — 82746 ASSAY OF FOLIC ACID SERUM: CPT

## 2020-10-13 PROCEDURE — 99220 PR INITIAL OBSERVATION CARE,LEVL III: CPT | Performed by: HOSPITALIST

## 2020-10-13 PROCEDURE — 86922 COMPATIBILITY TEST ANTIGLOB: CPT | Mod: 91

## 2020-10-13 PROCEDURE — 83735 ASSAY OF MAGNESIUM: CPT

## 2020-10-13 PROCEDURE — 86901 BLOOD TYPING SEROLOGIC RH(D): CPT

## 2020-10-13 PROCEDURE — 82607 VITAMIN B-12: CPT

## 2020-10-13 PROCEDURE — C9803 HOPD COVID-19 SPEC COLLECT: HCPCS | Performed by: HOSPITALIST

## 2020-10-13 PROCEDURE — 85025 COMPLETE CBC W/AUTO DIFF WBC: CPT

## 2020-10-13 PROCEDURE — 83550 IRON BINDING TEST: CPT

## 2020-10-13 PROCEDURE — 80048 BASIC METABOLIC PNL TOTAL CA: CPT

## 2020-10-13 PROCEDURE — 99285 EMERGENCY DEPT VISIT HI MDM: CPT

## 2020-10-13 PROCEDURE — 700102 HCHG RX REV CODE 250 W/ 637 OVERRIDE(OP): Performed by: HOSPITALIST

## 2020-10-13 PROCEDURE — U0003 INFECTIOUS AGENT DETECTION BY NUCLEIC ACID (DNA OR RNA); SEVERE ACUTE RESPIRATORY SYNDROME CORONAVIRUS 2 (SARS-COV-2) (CORONAVIRUS DISEASE [COVID-19]), AMPLIFIED PROBE TECHNIQUE, MAKING USE OF HIGH THROUGHPUT TECHNOLOGIES AS DESCRIBED BY CMS-2020-01-R: HCPCS

## 2020-10-13 RX ORDER — ACETAMINOPHEN 325 MG/1
650 TABLET ORAL EVERY 6 HOURS PRN
Status: DISCONTINUED | OUTPATIENT
Start: 2020-10-13 | End: 2020-10-14 | Stop reason: HOSPADM

## 2020-10-13 RX ORDER — ONDANSETRON 4 MG/1
4 TABLET, ORALLY DISINTEGRATING ORAL EVERY 4 HOURS PRN
Status: DISCONTINUED | OUTPATIENT
Start: 2020-10-13 | End: 2020-10-14 | Stop reason: HOSPADM

## 2020-10-13 RX ORDER — BISACODYL 10 MG
10 SUPPOSITORY, RECTAL RECTAL
Status: DISCONTINUED | OUTPATIENT
Start: 2020-10-13 | End: 2020-10-14 | Stop reason: HOSPADM

## 2020-10-13 RX ORDER — POLYETHYLENE GLYCOL 3350 17 G/17G
1 POWDER, FOR SOLUTION ORAL
Status: DISCONTINUED | OUTPATIENT
Start: 2020-10-13 | End: 2020-10-14 | Stop reason: HOSPADM

## 2020-10-13 RX ORDER — AMLODIPINE BESYLATE 5 MG/1
10 TABLET ORAL EVERY EVENING
Status: DISCONTINUED | OUTPATIENT
Start: 2020-10-13 | End: 2020-10-14 | Stop reason: HOSPADM

## 2020-10-13 RX ORDER — PROCHLORPERAZINE EDISYLATE 5 MG/ML
5-10 INJECTION INTRAMUSCULAR; INTRAVENOUS EVERY 4 HOURS PRN
Status: DISCONTINUED | OUTPATIENT
Start: 2020-10-13 | End: 2020-10-14 | Stop reason: HOSPADM

## 2020-10-13 RX ORDER — AMOXICILLIN 250 MG
2 CAPSULE ORAL 2 TIMES DAILY
Status: DISCONTINUED | OUTPATIENT
Start: 2020-10-13 | End: 2020-10-14 | Stop reason: HOSPADM

## 2020-10-13 RX ORDER — HYDROXYCHLOROQUINE SULFATE 200 MG/1
200 TABLET, FILM COATED ORAL EVERY EVENING
Status: DISCONTINUED | OUTPATIENT
Start: 2020-10-13 | End: 2020-10-14 | Stop reason: HOSPADM

## 2020-10-13 RX ORDER — DIPHENHYDRAMINE HYDROCHLORIDE 50 MG/ML
25 INJECTION INTRAMUSCULAR; INTRAVENOUS ONCE
Status: COMPLETED | OUTPATIENT
Start: 2020-10-13 | End: 2020-10-14

## 2020-10-13 RX ORDER — MYCOPHENOLIC ACID 180 MG/1
180 TABLET, DELAYED RELEASE ORAL EVERY EVENING
Status: DISCONTINUED | OUTPATIENT
Start: 2020-10-13 | End: 2020-10-14

## 2020-10-13 RX ORDER — ONDANSETRON 2 MG/ML
4 INJECTION INTRAMUSCULAR; INTRAVENOUS EVERY 4 HOURS PRN
Status: DISCONTINUED | OUTPATIENT
Start: 2020-10-13 | End: 2020-10-14 | Stop reason: HOSPADM

## 2020-10-13 RX ORDER — ATENOLOL 25 MG/1
25 TABLET ORAL EVERY EVENING
Status: DISCONTINUED | OUTPATIENT
Start: 2020-10-13 | End: 2020-10-14 | Stop reason: HOSPADM

## 2020-10-13 RX ORDER — PROMETHAZINE HYDROCHLORIDE 25 MG/1
12.5-25 TABLET ORAL EVERY 4 HOURS PRN
Status: DISCONTINUED | OUTPATIENT
Start: 2020-10-13 | End: 2020-10-14 | Stop reason: HOSPADM

## 2020-10-13 RX ORDER — SUCRALFATE ORAL 1 G/10ML
1 SUSPENSION ORAL
Status: DISCONTINUED | OUTPATIENT
Start: 2020-10-14 | End: 2020-10-14 | Stop reason: HOSPADM

## 2020-10-13 RX ORDER — OMEPRAZOLE 20 MG/1
20 CAPSULE, DELAYED RELEASE ORAL 2 TIMES DAILY
Status: DISCONTINUED | OUTPATIENT
Start: 2020-10-13 | End: 2020-10-14 | Stop reason: HOSPADM

## 2020-10-13 RX ORDER — PROMETHAZINE HYDROCHLORIDE 25 MG/1
12.5-25 SUPPOSITORY RECTAL EVERY 4 HOURS PRN
Status: DISCONTINUED | OUTPATIENT
Start: 2020-10-13 | End: 2020-10-14 | Stop reason: HOSPADM

## 2020-10-13 RX ADMIN — ONDANSETRON 4 MG: 4 TABLET, ORALLY DISINTEGRATING ORAL at 22:48

## 2020-10-13 RX ADMIN — OMEPRAZOLE 20 MG: 20 CAPSULE, DELAYED RELEASE ORAL at 22:41

## 2020-10-13 RX ADMIN — ATENOLOL 25 MG: 25 TABLET ORAL at 22:41

## 2020-10-13 RX ADMIN — HYDROXYCHLOROQUINE SULFATE 200 MG: 200 TABLET, FILM COATED ORAL at 22:41

## 2020-10-13 RX ADMIN — AMLODIPINE BESYLATE 10 MG: 5 TABLET ORAL at 22:42

## 2020-10-13 ASSESSMENT — ENCOUNTER SYMPTOMS
DEPRESSION: 0
PALPITATIONS: 1
HEARTBURN: 0
BLURRED VISION: 0
HEADACHES: 0
MYALGIAS: 0
BRUISES/BLEEDS EASILY: 0
DIZZINESS: 0
BACK PAIN: 0
NAUSEA: 0
HEMOPTYSIS: 0
PND: 0
DOUBLE VISION: 0
CLAUDICATION: 0
COUGH: 0
VOMITING: 0
FEVER: 0
CHILLS: 0

## 2020-10-13 ASSESSMENT — COGNITIVE AND FUNCTIONAL STATUS - GENERAL
MOBILITY SCORE: 24
DAILY ACTIVITIY SCORE: 24
DAILY ACTIVITIY SCORE: 24
SUGGESTED CMS G CODE MODIFIER MOBILITY: CH
SUGGESTED CMS G CODE MODIFIER DAILY ACTIVITY: CH
SUGGESTED CMS G CODE MODIFIER DAILY ACTIVITY: CH

## 2020-10-13 ASSESSMENT — LIFESTYLE VARIABLES
EVER FELT BAD OR GUILTY ABOUT YOUR DRINKING: NO
EVER_SMOKED: NEVER
HAVE PEOPLE ANNOYED YOU BY CRITICIZING YOUR DRINKING: NO
CONSUMPTION TOTAL: NEGATIVE
HAVE YOU EVER FELT YOU SHOULD CUT DOWN ON YOUR DRINKING: NO
EVER HAD A DRINK FIRST THING IN THE MORNING TO STEADY YOUR NERVES TO GET RID OF A HANGOVER: NO
HOW MANY TIMES IN THE PAST YEAR HAVE YOU HAD 5 OR MORE DRINKS IN A DAY: 0
TOTAL SCORE: 0
AVERAGE NUMBER OF DAYS PER WEEK YOU HAVE A DRINK CONTAINING ALCOHOL: 0
ALCOHOL_USE: NO
TOTAL SCORE: 0
TOTAL SCORE: 0
ON A TYPICAL DAY WHEN YOU DRINK ALCOHOL HOW MANY DRINKS DO YOU HAVE: 0

## 2020-10-13 ASSESSMENT — PATIENT HEALTH QUESTIONNAIRE - PHQ9
1. LITTLE INTEREST OR PLEASURE IN DOING THINGS: NOT AT ALL
SUM OF ALL RESPONSES TO PHQ9 QUESTIONS 1 AND 2: 0
2. FEELING DOWN, DEPRESSED, IRRITABLE, OR HOPELESS: NOT AT ALL

## 2020-10-13 ASSESSMENT — FIBROSIS 4 INDEX: FIB4 SCORE: 2.13

## 2020-10-14 VITALS
BODY MASS INDEX: 22.39 KG/M2 | SYSTOLIC BLOOD PRESSURE: 147 MMHG | HEART RATE: 100 BPM | OXYGEN SATURATION: 93 % | WEIGHT: 142.64 LBS | HEIGHT: 67 IN | DIASTOLIC BLOOD PRESSURE: 102 MMHG | RESPIRATION RATE: 17 BRPM | TEMPERATURE: 97.7 F

## 2020-10-14 PROBLEM — K21.9 GASTROESOPHAGEAL REFLUX DISEASE WITHOUT ESOPHAGITIS: Status: ACTIVE | Noted: 2020-10-14

## 2020-10-14 LAB
ALBUMIN SERPL BCP-MCNC: 3.1 G/DL (ref 3.2–4.9)
ALBUMIN/GLOB SERPL: 0.7 G/DL
ALP SERPL-CCNC: 67 U/L (ref 30–99)
ALT SERPL-CCNC: 10 U/L (ref 2–50)
ANION GAP SERPL CALC-SCNC: 12 MMOL/L (ref 7–16)
AST SERPL-CCNC: 25 U/L (ref 12–45)
BILIRUB SERPL-MCNC: 0.4 MG/DL (ref 0.1–1.5)
BUN SERPL-MCNC: 57 MG/DL (ref 8–22)
CALCIUM SERPL-MCNC: 8.6 MG/DL (ref 8.4–10.2)
CHLORIDE SERPL-SCNC: 93 MMOL/L (ref 96–112)
CO2 SERPL-SCNC: 26 MMOL/L (ref 20–33)
CREAT SERPL-MCNC: 7.25 MG/DL (ref 0.5–1.4)
ERYTHROCYTE [DISTWIDTH] IN BLOOD BY AUTOMATED COUNT: 58.5 FL (ref 35.9–50)
FERRITIN SERPL-MCNC: 842 NG/ML (ref 10–291)
FOLATE SERPL-MCNC: 3.4 NG/ML
GLOBULIN SER CALC-MCNC: 4.7 G/DL (ref 1.9–3.5)
GLUCOSE SERPL-MCNC: 85 MG/DL (ref 65–99)
HCT VFR BLD AUTO: 22.9 % (ref 37–47)
HGB BLD-MCNC: 6.9 G/DL (ref 12–16)
IRON SATN MFR SERPL: 37 % (ref 15–55)
IRON SERPL-MCNC: 55 UG/DL (ref 40–170)
MCH RBC QN AUTO: 28.5 PG (ref 27–33)
MCHC RBC AUTO-ENTMCNC: 30.1 G/DL (ref 33.6–35)
MCV RBC AUTO: 94.6 FL (ref 81.4–97.8)
PLATELET # BLD AUTO: 101 K/UL (ref 164–446)
PMV BLD AUTO: 11 FL (ref 9–12.9)
POTASSIUM SERPL-SCNC: 5 MMOL/L (ref 3.6–5.5)
PROT SERPL-MCNC: 7.8 G/DL (ref 6–8.2)
RBC # BLD AUTO: 2.42 M/UL (ref 4.2–5.4)
SARS-COV-2 RNA RESP QL NAA+PROBE: NOTDETECTED
SODIUM SERPL-SCNC: 131 MMOL/L (ref 135–145)
SPECIMEN SOURCE: NORMAL
TIBC SERPL-MCNC: 149 UG/DL (ref 250–450)
UIBC SERPL-MCNC: 94 UG/DL (ref 110–370)
VIT B12 SERPL-MCNC: 402 PG/ML (ref 211–911)
WBC # BLD AUTO: 4.6 K/UL (ref 4.8–10.8)

## 2020-10-14 PROCEDURE — 700111 HCHG RX REV CODE 636 W/ 250 OVERRIDE (IP): Performed by: INTERNAL MEDICINE

## 2020-10-14 PROCEDURE — A9270 NON-COVERED ITEM OR SERVICE: HCPCS | Performed by: HOSPITALIST

## 2020-10-14 PROCEDURE — 96374 THER/PROPH/DIAG INJ IV PUSH: CPT

## 2020-10-14 PROCEDURE — 700102 HCHG RX REV CODE 250 W/ 637 OVERRIDE(OP): Performed by: HOSPITALIST

## 2020-10-14 PROCEDURE — 80053 COMPREHEN METABOLIC PANEL: CPT

## 2020-10-14 PROCEDURE — 99217 PR OBSERVATION CARE DISCHARGE: CPT | Performed by: INTERNAL MEDICINE

## 2020-10-14 PROCEDURE — G0378 HOSPITAL OBSERVATION PER HR: HCPCS

## 2020-10-14 PROCEDURE — 85027 COMPLETE CBC AUTOMATED: CPT

## 2020-10-14 PROCEDURE — 96376 TX/PRO/DX INJ SAME DRUG ADON: CPT

## 2020-10-14 PROCEDURE — 36430 TRANSFUSION BLD/BLD COMPNT: CPT

## 2020-10-14 PROCEDURE — P9016 RBC LEUKOCYTES REDUCED: HCPCS | Mod: 91

## 2020-10-14 RX ORDER — DIPHENHYDRAMINE HYDROCHLORIDE 50 MG/ML
25 INJECTION INTRAMUSCULAR; INTRAVENOUS
Status: COMPLETED | OUTPATIENT
Start: 2020-10-14 | End: 2020-10-14

## 2020-10-14 RX ADMIN — ACETAMINOPHEN 650 MG: 325 TABLET, FILM COATED ORAL at 00:43

## 2020-10-14 RX ADMIN — DIPHENHYDRAMINE HYDROCHLORIDE 25 MG: 50 INJECTION, SOLUTION INTRAMUSCULAR; INTRAVENOUS at 00:43

## 2020-10-14 RX ADMIN — ACETAMINOPHEN 650 MG: 325 TABLET, FILM COATED ORAL at 10:30

## 2020-10-14 RX ADMIN — DIPHENHYDRAMINE HYDROCHLORIDE 25 MG: 50 INJECTION INTRAMUSCULAR; INTRAVENOUS at 10:30

## 2020-10-14 RX ADMIN — SUCRALFATE 1 G: 1 SUSPENSION ORAL at 12:14

## 2020-10-14 RX ADMIN — SUCRALFATE 1 G: 1 SUSPENSION ORAL at 06:09

## 2020-10-14 RX ADMIN — OMEPRAZOLE 20 MG: 20 CAPSULE, DELAYED RELEASE ORAL at 06:10

## 2020-10-14 ASSESSMENT — PAIN DESCRIPTION - PAIN TYPE: TYPE: ACUTE PAIN

## 2020-10-14 NOTE — H&P
"Hospital Medicine History & Physical Note    Date of Service  10/13/2020    Primary Care Physician  Ella Matthew M.D.    Consultants  None    Code Status  Full Code    Chief Complaint  Chief Complaint   Patient presents with   • Abnormal Labs     pt reports was told Hemoglobin is low, \"it's 5 something\"       History of Presenting Illness  24 y.o. female who presented 10/13/2020 with past medical history of lupus, end-stage renal disease on dialysis Monday Wednesday Friday, last dialysis last Monday is coming today due to anemia, hemoglobin 5.4, patient had blood draw on Monday and was told to repeated today today she was called and asked to come to the emergency room for blood transfusion, patient denies any dizziness lightheadedness no chest pain she complains of some palpitations with ambulation, patient still makes small amount of urine she denies any hematuria melena hematochezia hemoptysis hematemesis, patient is on hydroxychloroquine and mycophenolate for her lupus, patient stated that she is having her menstrual period right now but she states that it is her normal menstrual.,  Denies any blood clots, no lower abdominal pain, no nausea no vomiting she has been able to tolerate diet, when I saw the patient she was in the emergency room she is alert oriented follows commands he is able to give good history, she is going to be admitted for observation, 1 unit of PRBC has been ordered by ERP, patient will need nephrology consultation in a.m., will repeat H&H after blood transfusion patient will probably require second unit but probably will need to coordinate with hemodialysis so patient does not get fluid overloaded, patient expressed understanding of her plan of care and agree with it all questions have been answered.    Review of Systems  Review of Systems   Constitutional: Negative for chills and fever.   HENT: Negative for congestion and nosebleeds.    Eyes: Negative for blurred vision and double " vision.   Respiratory: Negative for cough and hemoptysis.    Cardiovascular: Positive for palpitations and leg swelling. Negative for chest pain, claudication and PND.   Gastrointestinal: Negative for heartburn, nausea and vomiting.   Genitourinary: Negative for hematuria and urgency.   Musculoskeletal: Negative for back pain and myalgias.   Skin: Negative for rash.   Neurological: Negative for dizziness and headaches.   Endo/Heme/Allergies: Does not bruise/bleed easily.   Psychiatric/Behavioral: Negative for depression and suicidal ideas.       Past Medical History   has a past medical history of Anemia (01/17/2018), AVF (arteriovenous fistula) ([facility]), Dialysis patient ([facility]), ESRD (end stage renal disease) on dialysis ([facility]) (01/17/2018), Heart burn, Hypertension (01/17/2018), Indigestion, Lupus ([facility]), Migraines (01/17/2018), and Seizure ([facility]) (2013).    Surgical History   has a past surgical history that includes ronak by laparoscopy (4/5/2010); av fistula creation (Right); angioplasty (01/17/2018); other; other; gastroscopy-endo (12/9/2019); gastroscopy (N/A, 5/30/2020); and gastroscopy-endo (9/18/2020).     Family History  family history includes Diabetes in her paternal grandmother.     Social History   reports that she has never smoked. She has never used smokeless tobacco. She reports that she does not drink alcohol or use drugs.    Allergies  Allergies   Allergen Reactions   • Clindamycin Rash     Hive   • Keflex Rash     Hives   • Methylprednisolone      Anxious;   • Metoprolol Nausea       Medications  Prior to Admission Medications   Prescriptions Last Dose Informant Patient Reported? Taking?   acetaminophen (TYLENOL) 500 MG Tab  Patient Yes No   Sig: Take 500 mg by mouth every 6 hours as needed for Moderate Pain.   amLODIPine (NORVASC) 10 MG Tab  Patient Yes No   Sig: Take 10 mg by mouth every evening.   atenolol (TENORMIN) 25 MG Tab  Patient Yes No   Sig: Take 25 mg by mouth every evening.    hydroxychloroquine (PLAQUENIL) 200 MG Tab  Patient Yes No   Sig: Take 200 mg by mouth every evening.   mycophenolate (MYFORTIC) 180 MG EC tablet  Patient No No   Sig: Take 1 Tab by mouth every evening.   omeprazole (PRILOSEC) 20 MG delayed-release capsule   No No   Sig: Take 1 Cap by mouth 2 times a day.   sucralfate (CARAFATE) 1 GM/10ML Suspension   No No   Sig: Take 10 mL by mouth 4 Times a Day,Before Meals and at Bedtime.      Facility-Administered Medications: None       Physical Exam  Temp:  [36.6 °C (97.8 °F)] 36.6 °C (97.8 °F)  Pulse:  [115] 115  Resp:  [15] 15  BP: (156)/(107) 156/107  SpO2:  [93 %] 93 %    Physical Exam  Vitals signs and nursing note reviewed.   Constitutional:       Appearance: Normal appearance.   HENT:      Head: Normocephalic.      Nose: Nose normal.      Mouth/Throat:      Mouth: Mucous membranes are moist.      Pharynx: Oropharynx is clear.   Eyes:      General:         Right eye: No discharge.         Left eye: No discharge.      Conjunctiva/sclera: Conjunctivae normal.      Pupils: Pupils are equal, round, and reactive to light.   Neck:      Musculoskeletal: Normal range of motion and neck supple. No neck rigidity or muscular tenderness.   Cardiovascular:      Rate and Rhythm: Normal rate and regular rhythm.      Pulses: Normal pulses.   Pulmonary:      Effort: Pulmonary effort is normal. No respiratory distress.      Breath sounds: Normal breath sounds. No wheezing.   Abdominal:      General: Bowel sounds are normal. There is no distension.      Palpations: Abdomen is soft.      Tenderness: There is no abdominal tenderness. There is no guarding.   Musculoskeletal: Normal range of motion.         General: No swelling.   Skin:     General: Skin is warm.      Capillary Refill: Capillary refill takes less than 2 seconds.   Neurological:      General: No focal deficit present.      Mental Status: She is alert and oriented to person, place, and time.      Cranial Nerves: No cranial  nerve deficit.   Psychiatric:         Mood and Affect: Mood normal.         Laboratory:  Recent Labs     10/13/20  2008   WBC 4.0*   RBC 1.81*   HEMOGLOBIN 5.2*   HEMATOCRIT 17.8*   MCV 97.8   MCH 28.2   MCHC 28.8*   RDW 65.5*   PLATELETCT 100*   MPV 10.4     Recent Labs     10/13/20  2008   SODIUM 133*   POTASSIUM 4.3   CHLORIDE 94*   CO2 27   GLUCOSE 95   BUN 52*   CREATININE 6.92*   CALCIUM 8.7     Recent Labs     10/13/20  2008   GLUCOSE 95         No results for input(s): NTPROBNP in the last 72 hours.      No results for input(s): TROPONINT in the last 72 hours.    Imaging:  No orders to display         Assessment/Plan:  I anticipate this patient is appropriate for observation status at this time.    * Anemia due to chronic kidney disease- (present on admission)  Assessment & Plan  Chronic anemia due to probably chronic kidney disease, lupus, exacerbated now due to acute blood loss due to menstrual.,  Hemoglobin 5.2, patient will receive 1 unit of PRBC, she most likely is going to require second unit but will follow-up H&H after first blood transfusion and coordinate with dialysis for the second unit if needed.    Lupus (HCC)- (present on admission)  Assessment & Plan  Continue home medications    ESRD (end stage renal disease) on dialysis (HCC)- (present on admission)  Assessment & Plan  On hemodialysis Monday Wednesday Friday, follows with Dr. Trent, will need nephrology consultation in a.m. for dialysis    Thrombocytopenia (HCC)- (present on admission)  Assessment & Plan  Probably due to lupus, end-stage renal disease    Hypertension- (present on admission)  Assessment & Plan  Continue amlodipine and atenolol      DVT prophylaxis SCDs

## 2020-10-14 NOTE — DISCHARGE PLANNING
Outpatient Dialysis Note    Patient has been set up to receive outpatient HD today at 2:30pm     Confirmed patient is active at:    [address] [place] [address]      Schedule: Monday, Wedensday, Friday   Time: 06:00am     Patient is seen by Dr. Trent in HD clinic.    Spoke with Leila at facility who confirmed.    Forwarded records for review.    Mary Han- Dialysis Coordinator  [facility] # [phone]

## 2020-10-14 NOTE — ED TRIAGE NOTES
"Chief Complaint   Patient presents with   • Abnormal Labs     pt reports was told Hemoglobin is low, \"it's 5 something\"     /107   Pulse (!) 115   Temp 36.6 °C (97.8 °F) (Temporal)   Resp 15   Ht 1.702 m (5' 7\")   Wt 64.7 kg (142 lb 10.2 oz)   LMP 09/13/2020   SpO2 93%   BMI 22.34 kg/m²     Pt had HD yesterday, reports blood was drawn and received call today for low H/H.  Pt reports started menstrual cycle on Sunday, denies any other bleeding at this time.    Covid Screen Negative.      "

## 2020-10-14 NOTE — PROGRESS NOTES
Pt discharged to home. Discharge instructions provided to pt. Pt verbalizes understanding. Pt states all questions have been answered. Signed copy in chart. Prescriptions remain the same. Pt states that all personal belongings are in possession. Pt off unit via wheelchair, escorted by this RN.

## 2020-10-14 NOTE — PROGRESS NOTES
Report received from MICHELLE Yang. Plan of care discussed. Patient resting comfortably in bed. Safety precautions in place.

## 2020-10-14 NOTE — ED PROVIDER NOTES
"ED Provider Note    CHIEF COMPLAINT  Chief Complaint   Patient presents with   • Abnormal Labs     pt reports was told Hemoglobin is low, \"it's 5 something\"       TAZ  Lily Nicole is a 24 y.o. female who presents to the emergency department chief complaint of anemia.  Unfortunately patient has a history of lupus and this end-stage renal disease on dialysis.  Her nephrologist is Dr. Trent.  She states that on Friday when she went to dialysis states that her hemoglobin was 6.8 because she was vomiting blood in the time she was menstruating they told her they just recheck it on Monday unfortunately started.  On Sunday she started her period . her menstrual cycle actually is in that heavy she is not having a lot of clotting or heavy bleeding but a repeat dialysis yesterday showed her hemoglobin and then 5 so they told her to come to the emergency department.  Otherwise she just feels like her heart is racing little bit otherwise she just feels fine    REVIEW OF SYSTEMS  Positives as above. Pertinent negatives include nausea vomiting fevers chills cough congestion chest pain abdominal pain flank pain joint pain  All other review of systems are negative    PAST MEDICAL HISTORY   has a past medical history of Anemia (01/17/2018), AVF (arteriovenous fistula) ([facility]), Dialysis patient ([facility]), ESRD (end stage renal disease) on dialysis ([facility]) (01/17/2018), Heart burn, Hypertension (01/17/2018), Indigestion, Lupus ([facility]), Migraines (01/17/2018), and Seizure ([facility]) (2013).    SOCIAL HISTORY  Social History     Tobacco Use   • Smoking status: Never Smoker   • Smokeless tobacco: Never Used   Substance and Sexual Activity   • Alcohol use: No   • Drug use: No   • Sexual activity: Not on file       SURGICAL HISTORY   has a past surgical history that includes ronak by laparoscopy (4/5/2010); av fistula creation (Right); angioplasty (01/17/2018); other; other; gastroscopy-endo (12/9/2019); gastroscopy (N/A, 5/30/2020); and " "gastroscopy-endo (9/18/2020).    CURRENT MEDICATIONS  Home Medications    **Home medications have not yet been reviewed for this encounter**         ALLERGIES  Allergies   Allergen Reactions   • Clindamycin Rash     Hive   • Keflex Rash     Hives   • Methylprednisolone      Anxious;   • Metoprolol Nausea       PHYSICAL EXAM  VITAL SIGNS: /107   Pulse (!) 115   Temp 36.6 °C (97.8 °F) (Temporal)   Resp 15   Ht 1.702 m (5' 7\")   Wt 64.7 kg (142 lb 10.2 oz)   LMP 09/13/2020   SpO2 93%   BMI 22.34 kg/m²    Pulse ox interpretation: I interpret this pulse ox as normal.  Constitutional: Alert in no apparent distress.  HENT: Normocephalic atraumatic, MMM  Eyes: PER, Conjunctiva pale non-icteric.   Neck: Normal range of motion, No tenderness, Supple, No stridor.   Cardiovascular: Regular rhythm tachycardic no murmurs.  Fistula in the right upper extremity good thrill and bruit  Thorax & Lungs: Normal breath sounds, No respiratory distress, No wheezing, No chest tenderness.   Abdomen: Bowel sounds normal, Soft, No tenderness, No pulsatile masses. No peritoneal signs.  Skin: Warm, Dry, No erythema, chronic skin changes  Back: No bony tenderness, No CVA tenderness.   Extremities/MSK: Intact equal distal pulses, No edema, No tenderness, No cyanosis, no major deformities noted  Neurologic: Alert and oriented x3, No focal deficits noted.       DIFFERENTIAL DIAGNOSIS AND WORK UP PLAN    This is a 24 y.o. female who presents with anemia hemoglobin of 5.2 not severe menstrual bleeding but she is a dialysis patient with renal disease with expected that she will be anemic states she was last dialyzed a month ago which usually she gets 1 dialysis a month.  I consented her for dialysis at this time and because she has a very low hemoglobin she likely needs at least 2 units and thus will be hospitalized for management of dialysis tomorrow.    DIAGNOSTIC STUDIES / PROCEDURES    EKG      LABS  Pertinent Lab Findings  CBC with " white blood cell count 4 and hemoglobin of 5.2 platelet count of 100, chronic kidney disease without hyperkalemia or acidosis      RADIOLOGY  No orders to display     The radiologist's interpretation of all radiological studies have been reviewed by me.      COURSE & MEDICAL DECISION MAKING  Pertinent Labs & Imaging studies reviewed. (See chart for details)     9:19 PM  Spoke w Dr Gonzalez who has accepted the patient for hospitalization.    Unfortunately due te patients hx of multiple transfusions her blood will have to come from [facility] due to multiple antibodies     I have explained to the patient the risks and benefits of transfusion of blood products.  This includes, as appropriate, the risk of mild allergic reaction, hemolytic reaction, transfusion-associated lung injury, febrile reactions, circulatory or iron overload, and infection.    We discussed possible alternatives and their risks, including directed donation, autologous transfusion, and no transfusion, including IV or oral iron supplementation, as appropriate.  I believe the patient understands the risks and benefits and was able to express understanding.      I verified that the patient was wearing a mask and I was wearing appropriate PPE every time I entered the room. The patient's mask was on the patient at all times during my encounter except for a brief view of the oropharynx.          FINAL IMPRESSION  1. Anemia  2. ESRD      Electronically signed by: Mary Kumar M.D., 10/13/2020 8:58 PM    This dictation has been created using voice recognition software and/or scribes. The accuracy of the dictation is limited by the abilities of the software and the expertise of the scribes. I expect there may be some errors of grammar and possibly content. I made every attempt to manually correct the errors within my dictation. However, errors related to voice recognition software and/or scribes may still exist and should be interpreted within the  appropriate context.

## 2020-10-14 NOTE — PROGRESS NOTES
"0104 - Blood transfusion initiated. VSS. Consent signed and in patient chart. IV benadryl administered, per pt \"I get itchy\" during transfusions.   0119 - VSS 15 after initiation of blood transfusion. Patient denies any adverse reactions. Will continue to monitor.   0348 - Blood transfusion complete. Patient denies any adverse reactions. Proper safety precautions I place. Call bell within reach. Will continue to monitor.   "

## 2020-10-14 NOTE — PROGRESS NOTES
Patient arrived to floor from ED via gurney. Patient able to ambulate from gurney to hospital bed. No assistance required, steady on feet. Patient denies any dizziness or lightheadedness. Patient oriented to room and shown proper use of call light and bed. Welcome packet provided and reviewed. Communication board updated. Proper safety precautions are in place. Call bell within reach. Will continue to monitor.

## 2020-10-14 NOTE — PROGRESS NOTES
Telemetry Strip     Strip printed: 0306  Measurements from am strip were as follows:  Rhythm:SR  HR:97  Measurements:.18/.08/.36          Telemetry Shift Summary:    Rhythm:SR/ST  HR:   Ectopy: None        Normal Values  Rhythm SR  HR Range    Measurements 0.12-0.20 / 0.06-0.10  / 0.30-0.52

## 2020-10-14 NOTE — DISCHARGE INSTRUCTIONS
Discharge Instructions    Discharged to home by car with relative. Discharged via walking, hospital escort: Yes.  Special equipment needed: Not Applicable    Be sure to schedule a follow-up appointment with your primary care doctor or any specialists as instructed.     Discharge Plan:   Influenza Vaccine Indication: Not indicated: Previously immunized this influenza season and > 8 years of age    I understand that a diet low in cholesterol, fat, and sodium is recommended for good health. Unless I have been given specific instructions below for another diet, I accept this instruction as my diet prescription.   Other diet: renal    Special Instructions: None    · Is patient discharged on Warfarin / Coumadin?   No     Depression / Suicide Risk    As you are discharged from this [facility] facility, it is important to learn how to keep safe from harming yourself.    Recognize the warning signs:  · Abrupt changes in personality, positive or negative- including increase in energy   · Giving away possessions  · Change in eating patterns- significant weight changes-  positive or negative  · Change in sleeping patterns- unable to sleep or sleeping all the time   · Unwillingness or inability to communicate  · Depression  · Unusual sadness, discouragement and loneliness  · Talk of wanting to die  · Neglect of personal appearance   · Rebelliousness- reckless behavior  · Withdrawal from people/activities they love  · Confusion- inability to concentrate     If you or a loved one observes any of these behaviors or has concerns about self-harm, here's what you can do:  · Talk about it- your feelings and reasons for harming yourself  · Remove any means that you might use to hurt yourself (examples: pills, rope, extension cords, firearm)  · Get professional help from the community (Mental Health, Substance Abuse, psychological counseling)  · Do not be alone:Call your Safe Contact- someone whom you trust who will be there for  you.  · Call your local CRISIS HOTLINE 890-2537 or 936-404-1889  · Call your local Children's Mobile Crisis Response Team [place] (910) 947-8988 or www.Clearwave  · Call the toll free National Suicide Prevention Hotlines   · National Suicide Prevention Lifeline 598-768-OCHD (9767)  · [employer] Line Network 800-SUICIDE (797-3070)        Anemia    Anemia is a condition in which you do not have enough red blood cells or hemoglobin. Hemoglobin is a substance in red blood cells that carries oxygen. When you do not have enough red blood cells or hemoglobin (are anemic), your body cannot get enough oxygen and your organs may not work properly. As a result, you may feel very tired or have other problems.  What are the causes?  Common causes of anemia include:  · Excessive bleeding. Anemia can be caused by excessive bleeding inside or outside the body, including bleeding from the intestine or from periods in women.  · Poor nutrition.  · Long-lasting (chronic) kidney, thyroid, and liver disease.  · Bone marrow disorders.  · Cancer and treatments for cancer.  · HIV (human immunodeficiency virus) and AIDS (acquired immunodeficiency syndrome).  · Treatments for HIV and AIDS.  · Spleen problems.  · Blood disorders.  · Infections, medicines, and autoimmune disorders that destroy red blood cells.  What are the signs or symptoms?  Symptoms of this condition include:  · Minor weakness.  · Dizziness.  · Headache.  · Feeling heartbeats that are irregular or faster than normal (palpitations).  · Shortness of breath, especially with exercise.  · Paleness.  · Cold sensitivity.  · Indigestion.  · Nausea.  · Difficulty sleeping.  · Difficulty concentrating.  Symptoms may occur suddenly or develop slowly. If your anemia is mild, you may not have symptoms.  How is this diagnosed?  This condition is diagnosed based on:  · Blood tests.  · Your medical history.  · A physical exam.  · Bone marrow biopsy.  Your health care  provider may also check your stool (feces) for blood and may do additional testing to look for the cause of your bleeding.  You may also have other tests, including:  · Imaging tests, such as a CT scan or MRI.  · Endoscopy.  · Colonoscopy.  How is this treated?  Treatment for this condition depends on the cause. If you continue to lose a lot of blood, you may need to be treated at a hospital. Treatment may include:  · Taking supplements of iron, vitamin B12, or folic acid.  · Taking a hormone medicine (erythropoietin) that can help to stimulate red blood cell growth.  · Having a blood transfusion. This may be needed if you lose a lot of blood.  · Making changes to your diet.  · Having surgery to remove your spleen.  Follow these instructions at home:  · Take over-the-counter and prescription medicines only as told by your health care provider.  · Take supplements only as told by your health care provider.  · Follow any diet instructions that you were given.  · Keep all follow-up visits as told by your health care provider. This is important.  Contact a health care provider if:  · You develop new bleeding anywhere in the body.  Get help right away if:  · You are very weak.  · You are short of breath.  · You have pain in your abdomen or chest.  · You are dizzy or feel faint.  · You have trouble concentrating.  · You have bloody or black, tarry stools.  · You vomit repeatedly or you vomit up blood.  Summary  · Anemia is a condition in which you do not have enough red blood cells or enough of a substance in your red blood cells that carries oxygen (hemoglobin).  · Symptoms may occur suddenly or develop slowly.  · If your anemia is mild, you may not have symptoms.  · This condition is diagnosed with blood tests as well as a medical history and physical exam. Other tests may be needed.  · Treatment for this condition depends on the cause of the anemia.  This information is not intended to replace advice given to you by  your health care provider. Make sure you discuss any questions you have with your health care provider.  Document Released: 01/25/2006 Document Revised: 11/30/2018 Document Reviewed: 01/19/2018  Elsevier Patient Education © 2020 [employer]

## 2020-10-14 NOTE — DISCHARGE SUMMARY
"Discharge Summary    CHIEF COMPLAINT ON ADMISSION  Chief Complaint   Patient presents with   • Abnormal Labs     pt reports was told Hemoglobin is low, \"it's 5 something\"       Reason for Admission  low hemoglobin, sent fron dialasys*     Admission Date  10/13/2020    CODE STATUS  Full Code    HPI & HOSPITAL COURSE  24 y.o. female admitted 10/13/2020 for blood transfusion due to chronic anemia. Patient had a repeat blood draw on 10/13/2020 and was advised to come to the ER for blood transfusion.     She has a history of lupus, end-stage renal disease (on HD MWF), essential hypertension, and GERD.  Nephrology were consulted.  Patient received 1 unit of packed RBCs.  Repeat hemoglobin was 6.9.  Second unit of PRBCs was ordered.  Patient will be going directly to outpatient dialysis after completing her second unit of PRBCs today.     Therefore, she is discharged in fair and stable condition to home with close outpatient follow-up.    Discharge Date  10/14/2020    FOLLOW UP ITEMS POST DISCHARGE  HD today    DISCHARGE DIAGNOSES  Principal Problem:    Anemia due to chronic kidney disease POA: Yes  Active Problems:    ESRD (end stage renal disease) on dialysis (HCC) POA: Yes    Lupus (HCC) POA: Yes    Hypertension POA: Yes      Overview: \"Controlled with medication\"    Thrombocytopenia (HCC) POA: Yes    Gastroesophageal reflux disease without esophagitis POA: Unknown  Resolved Problems:    * No resolved hospital problems. *      FOLLOW UP  No future appointments.  Ella Matthew M.D.  [address] [place] [address]  [place] [id number]  [phone]    Schedule an appointment as soon as possible for a visit in 1 week  for follow up after hospitalization      MEDICATIONS ON DISCHARGE     Medication List      CONTINUE taking these medications      Instructions   acetaminophen 500 MG Tabs  Commonly known as: TYLENOL   Take 500 mg by mouth every 6 hours as needed for Moderate Pain.  Dose: 500 mg     amLODIPine 10 MG Tabs  Commonly known " as: NORVASC   Take 10 mg by mouth every evening.  Dose: 10 mg     atenolol 25 MG Tabs  Commonly known as: TENORMIN   Take 25 mg by mouth every evening.  Dose: 25 mg     hydroxychloroquine 200 MG Tabs  Commonly known as: Plaquenil   Take 200 mg by mouth every evening.  Dose: 200 mg     mycophenolate 180 MG EC tablet  Commonly known as: Myfortic   Take 1 Tab by mouth every evening.  Dose: 180 mg     omeprazole 20 MG delayed-release capsule  Commonly known as: PRILOSEC   Take 1 Cap by mouth 2 times a day.  Dose: 20 mg     sucralfate 1 GM/10ML Susp  Commonly known as: CARAFATE   Take 10 mL by mouth 4 Times a Day,Before Meals and at Bedtime.  Dose: 1 g            Allergies  Allergies   Allergen Reactions   • Clindamycin Rash     Hive   • Keflex Rash     Hives   • Methylprednisolone      Anxious;   • Metoprolol Nausea       DIET  Orders Placed This Encounter   Procedures   • Diet Order Renal     Standing Status:   Standing     Number of Occurrences:   1     Order Specific Question:   Diet:     Answer:   Renal [8]       ACTIVITY  As tolerated.    CONSULTATIONS  Nephrology    PROCEDURES  HD, blood transfusion    LABORATORY  Lab Results   Component Value Date    SODIUM 131 (L) 10/14/2020    POTASSIUM 5.0 10/14/2020    CHLORIDE 93 (L) 10/14/2020    CO2 26 10/14/2020    GLUCOSE 85 10/14/2020    BUN 57 (H) 10/14/2020    CREATININE 7.25 (HH) 10/14/2020        Lab Results   Component Value Date    WBC 4.6 (L) 10/14/2020    HEMOGLOBIN 6.9 (L) 10/14/2020    HEMATOCRIT 22.9 (L) 10/14/2020    PLATELETCT 101 (L) 10/14/2020        Total time of the discharge process exceeds 40 minutes.

## 2020-10-14 NOTE — ED NOTES
Matias from Lab called with critical result of H+H 5.2/17.8 at 2044. Critical lab result read back to Matias.   Dr. Kumar notified of critical lab result at 2050.  Critical lab result read back by Dr. Kumar.

## 2020-10-14 NOTE — ASSESSMENT & PLAN NOTE
End-stage renal disease with hemodialysis on Mondays, Wednesdays, Fridays.  Follows Dr. Trent.  Nephrology were consulted.

## 2020-10-14 NOTE — CARE PLAN
Problem: Communication  Goal: The ability to communicate needs accurately and effectively will improve  Outcome: PROGRESSING AS EXPECTED  Note: Patient verbalizes needs clearly and effectively.      Problem: Safety  Goal: Will remain free from injury  Outcome: PROGRESSING AS EXPECTED  Note: Proper safety precautions are in place.

## 2020-10-14 NOTE — ASSESSMENT & PLAN NOTE
Chronic anemia due to lupus and end-stage renal disease.  Currently on menstrual cycle.  Received 1 unit of blood.

## 2020-11-11 ENCOUNTER — HOSPITAL ENCOUNTER (INPATIENT)
Facility: MEDICAL CENTER | Age: 25
LOS: 1 days | DRG: 377 | End: 2020-11-12
Attending: EMERGENCY MEDICINE | Admitting: HOSPITALIST
Payer: COMMERCIAL

## 2020-11-11 DIAGNOSIS — D64.9 CHRONIC ANEMIA: ICD-10-CM

## 2020-11-11 DIAGNOSIS — K92.2 ACUTE UPPER GI BLEED: ICD-10-CM

## 2020-11-11 LAB
ABO GROUP BLD: NORMAL
ALBUMIN SERPL BCP-MCNC: 3.8 G/DL (ref 3.2–4.9)
ALBUMIN/GLOB SERPL: 0.7 G/DL
ALP SERPL-CCNC: 75 U/L (ref 30–99)
ALT SERPL-CCNC: 11 U/L (ref 2–50)
ANION GAP SERPL CALC-SCNC: 14 MMOL/L (ref 7–16)
ANISOCYTOSIS BLD QL SMEAR: ABNORMAL
APTT PPP: 29.5 SEC (ref 24.7–36)
AST SERPL-CCNC: 34 U/L (ref 12–45)
BASOPHILS # BLD AUTO: 0.4 % (ref 0–1.8)
BASOPHILS # BLD: 0.02 K/UL (ref 0–0.12)
BILIRUB SERPL-MCNC: 0.5 MG/DL (ref 0.1–1.5)
BLD GP AB SCN SERPL QL: NORMAL
BUN SERPL-MCNC: 43 MG/DL (ref 8–22)
CALCIUM SERPL-MCNC: 9.6 MG/DL (ref 8.4–10.2)
CFT BLD TEG: 4.7 MIN (ref 5–10)
CHLORIDE SERPL-SCNC: 87 MMOL/L (ref 96–112)
CLOT ANGLE BLD TEG: 72.1 DEGREES (ref 53–72)
CLOT LYSIS 30M P MA LENFR BLD TEG: 0.8 % (ref 0–8)
CO2 SERPL-SCNC: 30 MMOL/L (ref 20–33)
COMMENT 1642: NORMAL
COVID ORDER STATUS COVID19: NORMAL
CREAT SERPL-MCNC: 7.42 MG/DL (ref 0.5–1.4)
CT.EXTRINSIC BLD ROTEM: 1.2 MIN (ref 1–3)
EOSINOPHIL # BLD AUTO: 0.04 K/UL (ref 0–0.51)
EOSINOPHIL NFR BLD: 0.8 % (ref 0–6.9)
ERYTHROCYTE [DISTWIDTH] IN BLOOD BY AUTOMATED COUNT: 52.9 FL (ref 35.9–50)
GLOBULIN SER CALC-MCNC: 5.8 G/DL (ref 1.9–3.5)
GLUCOSE SERPL-MCNC: 88 MG/DL (ref 65–99)
HCT VFR BLD AUTO: 30.3 % (ref 37–47)
HGB BLD-MCNC: 8.7 G/DL (ref 12–16)
HGB BLD-MCNC: 9 G/DL (ref 12–16)
HYPOCHROMIA BLD QL SMEAR: ABNORMAL
IMM GRANULOCYTES # BLD AUTO: 0.01 K/UL (ref 0–0.11)
IMM GRANULOCYTES NFR BLD AUTO: 0.2 % (ref 0–0.9)
INR PPP: 1 (ref 0.87–1.13)
LIPASE SERPL-CCNC: 27 U/L (ref 7–58)
LYMPHOCYTES # BLD AUTO: 0.56 K/UL (ref 1–4.8)
LYMPHOCYTES NFR BLD: 10.9 % (ref 22–41)
MAGNESIUM SERPL-MCNC: 1.9 MG/DL (ref 1.5–2.5)
MCF BLD TEG: 63.8 MM (ref 50–70)
MCH RBC QN AUTO: 26.3 PG (ref 27–33)
MCHC RBC AUTO-ENTMCNC: 29.7 G/DL (ref 33.6–35)
MCV RBC AUTO: 88.6 FL (ref 81.4–97.8)
MONOCYTES # BLD AUTO: 0.23 K/UL (ref 0–0.85)
MONOCYTES NFR BLD AUTO: 4.5 % (ref 0–13.4)
NEUTROPHILS # BLD AUTO: 4.26 K/UL (ref 2–7.15)
NEUTROPHILS NFR BLD: 83.2 % (ref 44–72)
NRBC # BLD AUTO: 0 K/UL
NRBC BLD-RTO: 0 /100 WBC
PLATELET # BLD AUTO: 133 K/UL (ref 164–446)
PLATELET BLD QL SMEAR: NORMAL
PMV BLD AUTO: 10.2 FL (ref 9–12.9)
POLYCHROMASIA BLD QL SMEAR: NORMAL
POTASSIUM SERPL-SCNC: 4.1 MMOL/L (ref 3.6–5.5)
PROT SERPL-MCNC: 9.6 G/DL (ref 6–8.2)
PROTHROMBIN TIME: 12.9 SEC (ref 12–14.6)
RBC # BLD AUTO: 3.42 M/UL (ref 4.2–5.4)
RBC BLD AUTO: PRESENT
RH BLD: NORMAL
SARS-COV+SARS-COV-2 AG RESP QL IA.RAPID: NOTDETECTED
SODIUM SERPL-SCNC: 131 MMOL/L (ref 135–145)
SPECIMEN SOURCE: NORMAL
TEG ALGORITHM TGALG: ABNORMAL
WBC # BLD AUTO: 5.1 K/UL (ref 4.8–10.8)

## 2020-11-11 PROCEDURE — 85576 BLOOD PLATELET AGGREGATION: CPT

## 2020-11-11 PROCEDURE — 96375 TX/PRO/DX INJ NEW DRUG ADDON: CPT

## 2020-11-11 PROCEDURE — 80074 ACUTE HEPATITIS PANEL: CPT

## 2020-11-11 PROCEDURE — 700102 HCHG RX REV CODE 250 W/ 637 OVERRIDE(OP)

## 2020-11-11 PROCEDURE — 700111 HCHG RX REV CODE 636 W/ 250 OVERRIDE (IP): Performed by: HOSPITALIST

## 2020-11-11 PROCEDURE — 86900 BLOOD TYPING SEROLOGIC ABO: CPT

## 2020-11-11 PROCEDURE — 700111 HCHG RX REV CODE 636 W/ 250 OVERRIDE (IP): Performed by: EMERGENCY MEDICINE

## 2020-11-11 PROCEDURE — 36415 COLL VENOUS BLD VENIPUNCTURE: CPT

## 2020-11-11 PROCEDURE — 700102 HCHG RX REV CODE 250 W/ 637 OVERRIDE(OP): Performed by: HOSPITALIST

## 2020-11-11 PROCEDURE — 85025 COMPLETE CBC W/AUTO DIFF WBC: CPT

## 2020-11-11 PROCEDURE — 94760 N-INVAS EAR/PLS OXIMETRY 1: CPT

## 2020-11-11 PROCEDURE — 770020 HCHG ROOM/CARE - TELE (206)

## 2020-11-11 PROCEDURE — 86706 HEP B SURFACE ANTIBODY: CPT

## 2020-11-11 PROCEDURE — A9270 NON-COVERED ITEM OR SERVICE: HCPCS | Performed by: HOSPITALIST

## 2020-11-11 PROCEDURE — 83690 ASSAY OF LIPASE: CPT

## 2020-11-11 PROCEDURE — C9113 INJ PANTOPRAZOLE SODIUM, VIA: HCPCS | Performed by: EMERGENCY MEDICINE

## 2020-11-11 PROCEDURE — 85018 HEMOGLOBIN: CPT

## 2020-11-11 PROCEDURE — 85730 THROMBOPLASTIN TIME PARTIAL: CPT

## 2020-11-11 PROCEDURE — 700105 HCHG RX REV CODE 258: Performed by: EMERGENCY MEDICINE

## 2020-11-11 PROCEDURE — 85347 COAGULATION TIME ACTIVATED: CPT

## 2020-11-11 PROCEDURE — 99223 1ST HOSP IP/OBS HIGH 75: CPT | Performed by: HOSPITALIST

## 2020-11-11 PROCEDURE — 85384 FIBRINOGEN ACTIVITY: CPT

## 2020-11-11 PROCEDURE — 700105 HCHG RX REV CODE 258: Performed by: HOSPITALIST

## 2020-11-11 PROCEDURE — 96365 THER/PROPH/DIAG IV INF INIT: CPT

## 2020-11-11 PROCEDURE — 99285 EMERGENCY DEPT VISIT HI MDM: CPT

## 2020-11-11 PROCEDURE — 96366 THER/PROPH/DIAG IV INF ADDON: CPT

## 2020-11-11 PROCEDURE — 85610 PROTHROMBIN TIME: CPT

## 2020-11-11 PROCEDURE — 86850 RBC ANTIBODY SCREEN: CPT

## 2020-11-11 PROCEDURE — 80053 COMPREHEN METABOLIC PANEL: CPT

## 2020-11-11 PROCEDURE — C9113 INJ PANTOPRAZOLE SODIUM, VIA: HCPCS | Performed by: HOSPITALIST

## 2020-11-11 PROCEDURE — 90935 HEMODIALYSIS ONE EVALUATION: CPT

## 2020-11-11 PROCEDURE — 86901 BLOOD TYPING SEROLOGIC RH(D): CPT

## 2020-11-11 PROCEDURE — 83735 ASSAY OF MAGNESIUM: CPT

## 2020-11-11 PROCEDURE — 99252 IP/OBS CONSLTJ NEW/EST SF 35: CPT | Performed by: INTERNAL MEDICINE

## 2020-11-11 RX ORDER — CALCIUM CARBONATE 500 MG/1
500 TABLET, CHEWABLE ORAL EVERY 6 HOURS PRN
Status: DISCONTINUED | OUTPATIENT
Start: 2020-11-11 | End: 2020-11-12 | Stop reason: HOSPADM

## 2020-11-11 RX ORDER — PROMETHAZINE HYDROCHLORIDE 25 MG/1
12.5-25 TABLET ORAL EVERY 4 HOURS PRN
Status: DISCONTINUED | OUTPATIENT
Start: 2020-11-11 | End: 2020-11-12 | Stop reason: HOSPADM

## 2020-11-11 RX ORDER — BISACODYL 10 MG
10 SUPPOSITORY, RECTAL RECTAL
Status: DISCONTINUED | OUTPATIENT
Start: 2020-11-11 | End: 2020-11-12

## 2020-11-11 RX ORDER — PROMETHAZINE HYDROCHLORIDE 25 MG/1
12.5-25 SUPPOSITORY RECTAL EVERY 4 HOURS PRN
Status: DISCONTINUED | OUTPATIENT
Start: 2020-11-11 | End: 2020-11-12 | Stop reason: HOSPADM

## 2020-11-11 RX ORDER — HYDROXYCHLOROQUINE SULFATE 200 MG/1
200 TABLET, FILM COATED ORAL EVERY EVENING
Status: DISCONTINUED | OUTPATIENT
Start: 2020-11-11 | End: 2020-11-12 | Stop reason: HOSPADM

## 2020-11-11 RX ORDER — ONDANSETRON 2 MG/ML
4 INJECTION INTRAMUSCULAR; INTRAVENOUS EVERY 4 HOURS PRN
Status: DISCONTINUED | OUTPATIENT
Start: 2020-11-11 | End: 2020-11-12 | Stop reason: HOSPADM

## 2020-11-11 RX ORDER — OXYCODONE HYDROCHLORIDE 5 MG/1
5 TABLET ORAL EVERY 4 HOURS PRN
Status: DISCONTINUED | OUTPATIENT
Start: 2020-11-11 | End: 2020-11-12 | Stop reason: HOSPADM

## 2020-11-11 RX ORDER — ATENOLOL 25 MG/1
25 TABLET ORAL EVERY EVENING
Status: DISCONTINUED | OUTPATIENT
Start: 2020-11-11 | End: 2020-11-12 | Stop reason: HOSPADM

## 2020-11-11 RX ORDER — POLYETHYLENE GLYCOL 3350 17 G/17G
1 POWDER, FOR SOLUTION ORAL
Status: DISCONTINUED | OUTPATIENT
Start: 2020-11-11 | End: 2020-11-12

## 2020-11-11 RX ORDER — AMLODIPINE BESYLATE 5 MG/1
10 TABLET ORAL EVERY EVENING
Status: DISCONTINUED | OUTPATIENT
Start: 2020-11-11 | End: 2020-11-12 | Stop reason: HOSPADM

## 2020-11-11 RX ORDER — PROCHLORPERAZINE EDISYLATE 5 MG/ML
5-10 INJECTION INTRAMUSCULAR; INTRAVENOUS EVERY 4 HOURS PRN
Status: DISCONTINUED | OUTPATIENT
Start: 2020-11-11 | End: 2020-11-12 | Stop reason: HOSPADM

## 2020-11-11 RX ORDER — ONDANSETRON 4 MG/1
4 TABLET, ORALLY DISINTEGRATING ORAL EVERY 4 HOURS PRN
Status: DISCONTINUED | OUTPATIENT
Start: 2020-11-11 | End: 2020-11-12 | Stop reason: HOSPADM

## 2020-11-11 RX ORDER — AMOXICILLIN 250 MG
2 CAPSULE ORAL 2 TIMES DAILY
Status: DISCONTINUED | OUTPATIENT
Start: 2020-11-11 | End: 2020-11-12

## 2020-11-11 RX ORDER — MYCOPHENOLIC ACID 180 MG/1
180 TABLET, DELAYED RELEASE ORAL EVERY EVENING
Status: DISCONTINUED | OUTPATIENT
Start: 2020-11-11 | End: 2020-11-12 | Stop reason: HOSPADM

## 2020-11-11 RX ADMIN — ONDANSETRON HYDROCHLORIDE 4 MG: 2 SOLUTION INTRAMUSCULAR; INTRAVENOUS at 11:27

## 2020-11-11 RX ADMIN — ONDANSETRON HYDROCHLORIDE 4 MG: 2 SOLUTION INTRAMUSCULAR; INTRAVENOUS at 18:18

## 2020-11-11 RX ADMIN — SODIUM CHLORIDE 40 MG: 9 INJECTION, SOLUTION INTRAVENOUS at 08:07

## 2020-11-11 RX ADMIN — HYDROXYCHLOROQUINE SULFATE 200 MG: 200 TABLET, FILM COATED ORAL at 18:08

## 2020-11-11 RX ADMIN — OXYCODONE 5 MG: 5 TABLET ORAL at 20:30

## 2020-11-11 RX ADMIN — CALCIUM CARBONATE (ANTACID) CHEW TAB 500 MG 500 MG: 500 CHEW TAB at 20:31

## 2020-11-11 RX ADMIN — SODIUM CHLORIDE 8 MG/HR: 9 INJECTION, SOLUTION INTRAVENOUS at 18:23

## 2020-11-11 RX ADMIN — AMLODIPINE BESYLATE 10 MG: 5 TABLET ORAL at 18:08

## 2020-11-11 RX ADMIN — ATENOLOL 25 MG: 25 TABLET ORAL at 18:08

## 2020-11-11 RX ADMIN — SODIUM CHLORIDE 8 MG/HR: 9 INJECTION, SOLUTION INTRAVENOUS at 09:13

## 2020-11-11 RX ADMIN — PROCHLORPERAZINE EDISYLATE 10 MG: 5 INJECTION INTRAMUSCULAR; INTRAVENOUS at 20:35

## 2020-11-11 RX ADMIN — MYCOPHENOLIC ACID 180 MG: 180 TABLET, DELAYED RELEASE ORAL at 20:30

## 2020-11-11 ASSESSMENT — ENCOUNTER SYMPTOMS
TINGLING: 0
HEARTBURN: 0
DOUBLE VISION: 0
NECK PAIN: 0
BLURRED VISION: 0
FEVER: 0
DEPRESSION: 0
MYALGIAS: 0
HEADACHES: 0
CLAUDICATION: 0
HEMOPTYSIS: 0
BRUISES/BLEEDS EASILY: 0
ABDOMINAL PAIN: 0
CHILLS: 0
SINUS PAIN: 0
PND: 0
PALPITATIONS: 0
NAUSEA: 0
VOMITING: 0
DIZZINESS: 0
COUGH: 0

## 2020-11-11 ASSESSMENT — PAIN DESCRIPTION - PAIN TYPE
TYPE: ACUTE PAIN

## 2020-11-11 ASSESSMENT — FIBROSIS 4 INDEX
FIB4 SCORE: 1.72
FIB4 SCORE: 1.7

## 2020-11-11 ASSESSMENT — LIFESTYLE VARIABLES: SUBSTANCE_ABUSE: 0

## 2020-11-11 NOTE — ASSESSMENT & PLAN NOTE
Continue home medications atenolol and amlodipine with holding parameter hold for less than 120 SBP.  Stable

## 2020-11-11 NOTE — H&P
Hospital Medicine History & Physical Note    Date of Service  11/11/2020    Primary Care Physician  Ella Matthew M.D.    Consultants  GI  nephro    Code Status  Full Code    Chief Complaint  Chief Complaint   Patient presents with   • Vomiting Blood     onset this morning.   • Abdominal Pain       History of Presenting Illness  24 y.o. female who presented 11/11/2020 with pmh of Lupus, ESRD, gastritis is coming today c/o bloody vomiting stated today morning patient was doing ok until this AM, she denies melena, blood in stool, no new changes in her medications, no epigastric pain, denies dizziness or lightheadedness no fever or chills no sob, no chest pain, patient is on PPI and sucralfate as outpatient due to gastritis and h/o GIB several months ago, she is s/p EGD on 9/16/2020 showing 1) Bleeding in stomach - active from 4 separate sites - oozing slowly from gastropathy, patient denies recent travel, no ill contact, patient in the ER has stable VS, hb is stable, she is alert and oriented and able to give good history, no no any A/C or NSAIDS', patient will be admitted to telemetry, continue close monitoring, gi and nephro consulted, patient will remain NPO for now, protinix drip, she expressed understanding of her plan of care and agreed with it,all questions answered.     Review of Systems  Review of Systems   Constitutional: Negative for chills and fever.   HENT: Negative for congestion and sinus pain.    Eyes: Negative for blurred vision and double vision.   Respiratory: Negative for cough and hemoptysis.    Cardiovascular: Negative for chest pain, palpitations, claudication, leg swelling and PND.   Gastrointestinal: Negative for abdominal pain, heartburn, nausea and vomiting.   Genitourinary: Negative for dysuria and urgency.   Musculoskeletal: Negative for myalgias and neck pain.   Skin: Negative for rash.   Neurological: Negative for dizziness, tingling and headaches.   Endo/Heme/Allergies: Does not  bruise/bleed easily.   Psychiatric/Behavioral: Negative for depression, substance abuse and suicidal ideas.       Past Medical History   has a past medical history of Anemia (01/17/2018), AVF (arteriovenous fistula) ([facility]), Dialysis patient ([facility]), ESRD (end stage renal disease) on dialysis ([facility]) (01/17/2018), Heart burn, Hypertension (01/17/2018), Indigestion, Lupus ([facility]), Migraines (01/17/2018), and Seizure ([facility]) (2013).    Surgical History   has a past surgical history that includes ronak by laparoscopy (4/5/2010); av fistula creation (Right); angioplasty (01/17/2018); other; other; gastroscopy-endo (12/9/2019); gastroscopy (N/A, 5/30/2020); and gastroscopy-endo (9/18/2020).     Family History  family history includes Diabetes in her paternal grandmother.     Social History   reports that she has never smoked. She has never used smokeless tobacco. She reports that she does not drink alcohol or use drugs.    Allergies  Allergies   Allergen Reactions   • Clindamycin Rash     Hive   • Keflex Rash     Hives   • Methylprednisolone      Anxious;   • Metoprolol Nausea       Medications  Prior to Admission Medications   Prescriptions Last Dose Informant Patient Reported? Taking?   acetaminophen (TYLENOL) 500 MG Tab 11/9/2020 at pm Patient Yes No   Sig: Take 500 mg by mouth every 6 hours as needed for Moderate Pain.   amLODIPine (NORVASC) 10 MG Tab 11/10/2020 at 2000 Patient Yes No   Sig: Take 10 mg by mouth every evening.   atenolol (TENORMIN) 25 MG Tab 11/10/2020 at 2000 Patient Yes No   Sig: Take 25 mg by mouth every evening.   hydroxychloroquine (PLAQUENIL) 200 MG Tab 11/10/2020 at 2000 Patient Yes No   Sig: Take 200 mg by mouth every evening.   mycophenolate (MYFORTIC) 180 MG EC tablet 11/10/2020 at 2000 Patient No No   Sig: Take 1 Tab by mouth every evening.   omeprazole (PRILOSEC) 20 MG delayed-release capsule 11/10/2020 at 2000 Patient No No   Sig: Take 1 Cap by mouth 2 times a day.   sucralfate (CARAFATE) 1 GM/10ML  Suspension 11/10/2020 at 2000 Patient No No   Sig: Take 10 mL by mouth 4 Times a Day,Before Meals and at Bedtime.   Patient taking differently: Take 1 g by mouth 2 times a day.      Facility-Administered Medications: None       Physical Exam  Temp:  [36.6 °C (97.9 °F)] 36.6 °C (97.9 °F)  Pulse:  [85] 85  Resp:  [19] 19  BP: (144)/(103) 144/103  SpO2:  [100 %] 100 %    Physical Exam  Vitals signs and nursing note reviewed.   Constitutional:       Appearance: Normal appearance.   HENT:      Head: Normocephalic.      Nose: Nose normal.      Mouth/Throat:      Mouth: Mucous membranes are moist.      Pharynx: Oropharynx is clear.   Eyes:      General: No scleral icterus.     Conjunctiva/sclera: Conjunctivae normal.      Pupils: Pupils are equal, round, and reactive to light.   Neck:      Musculoskeletal: Normal range of motion and neck supple. No neck rigidity.   Cardiovascular:      Rate and Rhythm: Normal rate and regular rhythm.      Pulses: Normal pulses.   Pulmonary:      Effort: Pulmonary effort is normal. No respiratory distress.      Breath sounds: Normal breath sounds. No wheezing.   Abdominal:      General: Bowel sounds are normal. There is no distension.      Palpations: Abdomen is soft.      Tenderness: There is no abdominal tenderness. There is no guarding.   Musculoskeletal: Normal range of motion.         General: No swelling.   Skin:     General: Skin is warm and dry.      Capillary Refill: Capillary refill takes less than 2 seconds.   Neurological:      General: No focal deficit present.      Mental Status: She is alert and oriented to person, place, and time.      Cranial Nerves: No cranial nerve deficit.   Psychiatric:         Mood and Affect: Mood normal.         Laboratory:  Recent Labs     11/11/20  0730   WBC 5.1   RBC 3.42*   HEMOGLOBIN 9.0*   HEMATOCRIT 30.3*   MCV 88.6   MCH 26.3*   MCHC 29.7*   RDW 52.9*   PLATELETCT 133*   MPV 10.2     Recent Labs     11/11/20  0730   SODIUM 131*   POTASSIUM  4.1   CHLORIDE 87*   CO2 30   GLUCOSE 88   BUN 43*   CREATININE 7.42*   CALCIUM 9.6     Recent Labs     11/11/20  0730   ALTSGPT 11   ASTSGOT 34   ALKPHOSPHAT 75   TBILIRUBIN 0.5   LIPASE 27   GLUCOSE 88     Recent Labs     11/11/20  0730   APTT 29.5   INR 1.00     No results for input(s): NTPROBNP in the last 72 hours.      No results for input(s): TROPONINT in the last 72 hours.    Imaging:  No orders to display         Assessment/Plan:  I anticipate this patient will require at least two midnights for appropriate medical management, necessitating inpatient admission.    * GIB (gastrointestinal bleeding)  Assessment & Plan  Possible UGIB, started on protonix drip, GI consulted, NPO.  Hb q4hrs  Telemetry.   Low threshold to transfer to ICU.    Lupus (HCC)- (present on admission)  Assessment & Plan  On hydroxychloroquine and mycophenolate.   Continue for now.       ESRD (end stage renal disease) on dialysis (HCC)- (present on admission)  Assessment & Plan  On HD MWF, nephro consulted.     Anemia due to chronic kidney disease- (present on admission)  Assessment & Plan  Hb stable, will continue monitoring hb q4hrs for now.   Telemetry.     Thrombocytopenia (HCC)- (present on admission)  Assessment & Plan  Probably due to ESRD, Lupus, medications.  Will check TEG. Due to gib.     Hyponatremia- (present on admission)  Assessment & Plan  Due to ESRD. Continue monitoring.     Hypertension- (present on admission)  Assessment & Plan  Continue home medications atenolol and amlodipine with holding parameter hold for less than 120 SBP.        dvt prophylaxis scd's.

## 2020-11-11 NOTE — DISCHARGE PLANNING
Outpatient Dialysis Note    Confirmed patient is active at:    [address] [address]    Schedule: Monday, Wednesday, Friday   Time: 05:45am     Patient is seen by Dr. Trent in HD clinic.    Spoke with Leila at facility who confirmed.    Forwarded records for review.    Mary Han- Dialysis Coordinator  [facility] # [phone]

## 2020-11-11 NOTE — ED NOTES
1100: Report received, assumed pt care. Pt requesting Zofran, will administer. Pt aware that we are waiting for an inpatient bed at this time. Will update pt when bed becomes available.

## 2020-11-11 NOTE — ED NOTES
Covid swab obtained, labeled and sent to lab.      Detail Level: Detailed Quality 111:Pneumonia Vaccination Status For Older Adults: Pneumococcal Vaccination Previously Received Quality 130: Documentation Of Current Medications In The Medical Record: Current Medications Documented Quality 431: Preventive Care And Screening: Unhealthy Alcohol Use - Screening: Patient screened for unhealthy alcohol use using a single question and scores less than 2 times per year Quality 110: Preventive Care And Screening: Influenza Immunization: Influenza Immunization Administered during Influenza season Quality 226: Preventive Care And Screening: Tobacco Use: Screening And Cessation Intervention: Patient screened for tobacco use and is an ex/non-smoker

## 2020-11-11 NOTE — ED PROVIDER NOTES
"ED Provider Note    CHIEF COMPLAINT  Chief Complaint   Patient presents with   • Vomiting Blood     onset this morning.   • Abdominal Pain       HPI  Lily Nicole is a 24 y.o. female who presents vomiting bright red blood this morning.  She states it was less than a cup.  It was bright red with some clots.  She has a history of gastric ulcers and had an EGD with Dr. Whitney on 9/16/2020.  She also has lupus with kidney failure.  She is on dialysis and on the transplant list.  Her next dialysis is due today.  Her nephrologist is Dr. Trent.  The patient denies any dizziness or lightheadedness.  She does have some abdominal pain.  She has been taking Carafate and omeprazole regularly without any missed doses.  She denies any smoking drinking or drug use.      REVIEW OF SYSTEMS  HEENT:  No ear pain, congestion or sore throat   EYES: no discharge redness or vision changes  CARDIAC: no chest pain, palpitations    PULMONARY: no dyspnea, cough or congestion   GI: Vomiting of bright red blood no diarrhea mild abdominal pain   : no dysuria, back pain or hematuria   Neuro: no weakness, numbness aphasia or headache  Musculoskeletal: no swelling deformity or pain no joint swelling  Endocrine: no fevers, sweating, weight loss   SKIN: no rash, erythema or contusions     See history of present illness all other systems are negative        PAST MEDICAL HISTORY  Past Medical History:   Diagnosis Date   • Anemia 01/17/2018   • ESRD (end stage renal disease) on dialysis ([facility]) 01/17/2018    Twan Fu   • Hypertension 01/17/2018    \"Controlled with medication\"   • Migraines 01/17/2018   • Seizure ([facility]) 2013    from high blood pressure, reports 1 time event   • AVF (arteriovenous fistula) ([facility])     Right Arm   • Dialysis patient ([facility])      dialysis, M,W,F Elizabeth/Joni   • Heart burn    • Indigestion    • Lupus ([facility])        FAMILY HISTORY  Family History   Problem Relation Age of Onset   • Diabetes Paternal Grandmother        SOCIAL " "HISTORY  Social History     Socioeconomic History   • Marital status: Single     Spouse name: Not on file   • Number of children: Not on file   • Years of education: Not on file   • Highest education level: Not on file   Occupational History   • Not on file   Social Needs   • Financial resource strain: Not on file   • Food insecurity     Worry: Never true     Inability: Never true   • Transportation needs     Medical: No     Non-medical: No   Tobacco Use   • Smoking status: Never Smoker   • Smokeless tobacco: Never Used   Substance and Sexual Activity   • Alcohol use: No   • Drug use: No   • Sexual activity: Not on file   Lifestyle   • Physical activity     Days per week: Not on file     Minutes per session: Not on file   • Stress: Not on file   Relationships   • Social connections     Talks on phone: Not on file     Gets together: Not on file     Attends Christian service: Not on file     Active member of club or organization: Not on file     Attends meetings of clubs or organizations: Not on file     Relationship status: Not on file   • Intimate partner violence     Fear of current or ex partner: Not on file     Emotionally abused: Not on file     Physically abused: Not on file     Forced sexual activity: Not on file   Other Topics Concern   • Not on file   Social History Narrative   • Not on file       SURGICAL HISTORY  Past Surgical History:   Procedure Laterality Date   • GASTROSCOPY-ENDO  9/18/2020    Procedure: GASTROSCOPY;  Surgeon: Gadiel Whitney M.D.;  Location: SURGERY [facility];  Service: Gastroenterology   • GASTROSCOPY N/A 5/30/2020    Procedure: GASTROSCOPY;  Surgeon: Waylon Mcqueen M.D.;  Location: [facility];  Service: Gastroenterology   • GASTROSCOPY-ENDO  12/9/2019    Procedure: GASTROSCOPY;  Surgeon: Aaron Kam M.D.;  Location: [facility];  Service: Gastroenterology   • ANGIOPLASTY  01/17/2018    \"Right Arm AV-Fistulagram & Angioplastyx3\"   • ULI " "BY LAPAROSCOPY  4/5/2010    Performed by SYED MARTELL at SURGERY [facility] ORS   • AV FISTULA CREATION Right    • OTHER      renal biopsy x 3   • OTHER      bone marrow biopsy       CURRENT MEDICATIONS  Home Medications     Reviewed by Shefali Mallory (Pharmacy Tech) on 11/11/20 at 0815  Med List Status: Complete   Medication Last Dose Status   acetaminophen (TYLENOL) 500 MG Tab 11/9/2020 Active   amLODIPine (NORVASC) 10 MG Tab 11/10/2020 Active   atenolol (TENORMIN) 25 MG Tab 11/10/2020 Active   hydroxychloroquine (PLAQUENIL) 200 MG Tab 11/10/2020 Active   mycophenolate (MYFORTIC) 180 MG EC tablet 11/10/2020 Active   omeprazole (PRILOSEC) 20 MG delayed-release capsule 11/10/2020 Active   sucralfate (CARAFATE) 1 GM/10ML Suspension 11/10/2020 Active                ALLERGIES  Allergies   Allergen Reactions   • Clindamycin Rash     Hive   • Keflex Rash     Hives   • Methylprednisolone      Anxious;   • Metoprolol Nausea       PHYSICAL EXAM  VITAL SIGNS: /103   Pulse 85   Temp 36.6 °C (97.9 °F) (Temporal)   Resp 19   Ht 1.702 m (5' 7\")   Wt 61.1 kg (134 lb 9.6 oz)   SpO2 100%   BMI 21.08 kg/m²    Constitutional: Well developed, Well nourished, No acute respiratory distress, Non-toxic appearance.   HENT: Normocephalic, Atraumatic, Bilateral external ears normal, Oropharynx clear, mucous membranes are moist.  Eyes: Conjunctiva normal, No discharge. No icterus.  Neck: Normal range of motion. Supple.  Lymphatic: No cervical lymphadenopathy noted.   Cardiovascular: Normal heart rate, Normal rhythm, No murmurs, No rubs, No gallops.   Thorax & Lungs: Clear to auscultation bilaterally, No respiratory distress, No wheezing.  Abdomen: Soft nontender normal bowel sounds no rebound masses or peritoneal signs  Skin: Warm, Dry, No erythema, No rash.   Extremities: Intact distal pulses, No edema, No tenderness  Musculoskeletal: Good range of motion in all major joints. Normal gait.  Neurologic: Alert & " oriented x 3. No focal deficits noted.        RADIOLOGY/PROCEDURES  none    COURSE & MEDICAL DECISION MAKING  Pertinent Labs & Imaging studies reviewed. (See chart for details)  Differential diagnosis: Upper GI bleed from gastric ulcers recurrent, anemia    Results for orders placed or performed during the hospital encounter of 11/11/20   CBC WITH DIFFERENTIAL   Result Value Ref Range    WBC 5.1 4.8 - 10.8 K/uL    RBC 3.42 (L) 4.20 - 5.40 M/uL    Hemoglobin 9.0 (L) 12.0 - 16.0 g/dL    Hematocrit 30.3 (L) 37.0 - 47.0 %    MCV 88.6 81.4 - 97.8 fL    MCH 26.3 (L) 27.0 - 33.0 pg    MCHC 29.7 (L) 33.6 - 35.0 g/dL    RDW 52.9 (H) 35.9 - 50.0 fL    Platelet Count 133 (L) 164 - 446 K/uL    MPV 10.2 9.0 - 12.9 fL    Neutrophils-Polys 83.20 (H) 44.00 - 72.00 %    Lymphocytes 10.90 (L) 22.00 - 41.00 %    Monocytes 4.50 0.00 - 13.40 %    Eosinophils 0.80 0.00 - 6.90 %    Basophils 0.40 0.00 - 1.80 %    Immature Granulocytes 0.20 0.00 - 0.90 %    Nucleated RBC 0.00 /100 WBC    Neutrophils (Absolute) 4.26 2.00 - 7.15 K/uL    Lymphs (Absolute) 0.56 (L) 1.00 - 4.80 K/uL    Monos (Absolute) 0.23 0.00 - 0.85 K/uL    Eos (Absolute) 0.04 0.00 - 0.51 K/uL    Baso (Absolute) 0.02 0.00 - 0.12 K/uL    Immature Granulocytes (abs) 0.01 0.00 - 0.11 K/uL    NRBC (Absolute) 0.00 K/uL    Hypochromia 1+     Anisocytosis 1+    COMP METABOLIC PANEL   Result Value Ref Range    Sodium 131 (L) 135 - 145 mmol/L    Potassium 4.1 3.6 - 5.5 mmol/L    Chloride 87 (L) 96 - 112 mmol/L    Co2 30 20 - 33 mmol/L    Anion Gap 14.0 7.0 - 16.0    Glucose 88 65 - 99 mg/dL    Bun 43 (H) 8 - 22 mg/dL    Creatinine 7.42 (HH) 0.50 - 1.40 mg/dL    Calcium 9.6 8.4 - 10.2 mg/dL    AST(SGOT) 34 12 - 45 U/L    ALT(SGPT) 11 2 - 50 U/L    Alkaline Phosphatase 75 30 - 99 U/L    Total Bilirubin 0.5 0.1 - 1.5 mg/dL    Albumin 3.8 3.2 - 4.9 g/dL    Total Protein 9.6 (H) 6.0 - 8.2 g/dL    Globulin 5.8 (H) 1.9 - 3.5 g/dL    A-G Ratio 0.7 g/dL   LIPASE   Result Value Ref Range     Lipase 27 7 - 58 U/L   PROTHROMBIN TIME (INR)   Result Value Ref Range    PT 12.9 12.0 - 14.6 sec    INR 1.00 0.87 - 1.13   APTT   Result Value Ref Range    APTT 29.5 24.7 - 36.0 sec   COD (ADULT)   Result Value Ref Range    ABO Grouping Only O     Rh Grouping Only POS     Antibody Screen-Cod NEG    COVID/SARS CoV-2 PCR    Specimen: Nasopharyngeal; Respirate   Result Value Ref Range    COVID Order Status Received    ESTIMATED GFR   Result Value Ref Range    GFR If  8 (A) >60 mL/min/1.73 m 2    GFR If Non African American 7 (A) >60 mL/min/1.73 m 2   SARS-CoV-2, PCR (In-House)   Result Value Ref Range    SARS-CoV-2 Source NP Swab    PLATELET ESTIMATE   Result Value Ref Range    Plt Estimation Decreased    MORPHOLOGY   Result Value Ref Range    RBC Morphology Present     Polychromia 1+    DIFFERENTIAL COMMENT   Result Value Ref Range    Comments-Diff see below    SARS-COV Antigen CLARY   Result Value Ref Range    SARS-CoV-2 Source Nasal Swab     SARS-COV ANTIGEN CLARY NotDetected Not-Detected        6:50 AM he was started and labs were drawn.  I gave the patient IV Protonix.  She is hemodynamically stable.    8:35 AM the patient's hemoglobin is 9.0 which is higher than it has been in a while.  She did have one episode of hematemesis in the emergency department but her vital signs have remained stable.  I spoke with the hospitalist and she will be admitted to their service for monitoring of her hemoglobin.  I have also paged GI and nephrology to consult on her case.    8:38 AM I Spoke with GI  who will consult on her case    9:02 AM I spoke with nephrology Dr. Najjar who will get her dialysis today.    I verified that the patient was wearing a mask and I was wearing appropriate PPE every time I entered the room. The patient's mask was on the patient at all times during my encounter except for a brief view of the oropharynx.    FINAL IMPRESSION  1. Acute upper GI bleed    2. Chronic anemia              Electronically signed by: Niecy Sandoval M.D., 11/11/2020 6:42 AM

## 2020-11-11 NOTE — ED TRIAGE NOTES
"Chief Complaint   Patient presents with   • Vomiting Blood     onset this morning.   • Abdominal Pain     ED Triage Vitals [11/11/20 0621]   Enc Vitals Group      Blood Pressure 144/103      Pulse 85      Respiration 19      Temperature 36.6 °C (97.9 °F)      Temp src Temporal      Pulse Oximetry 100 %      Weight 61.1 kg (134 lb 9.6 oz)      Height 1.702 m (5' 7\")      Vomited twice, small amounts with blood clots. Also c/o abd pain. Dialysis M/W/F  "

## 2020-11-11 NOTE — ED NOTES
Pt has vomited small amount of blood with clots and small amount of coffee ground emisis.  ERP updated.

## 2020-11-12 ENCOUNTER — ANESTHESIA EVENT (OUTPATIENT)
Dept: SURGERY | Facility: MEDICAL CENTER | Age: 25
DRG: 377 | End: 2020-11-12
Payer: COMMERCIAL

## 2020-11-12 ENCOUNTER — ANESTHESIA (OUTPATIENT)
Dept: SURGERY | Facility: MEDICAL CENTER | Age: 25
DRG: 377 | End: 2020-11-12
Payer: COMMERCIAL

## 2020-11-12 VITALS
OXYGEN SATURATION: 96 % | TEMPERATURE: 97.6 F | SYSTOLIC BLOOD PRESSURE: 133 MMHG | WEIGHT: 135.58 LBS | RESPIRATION RATE: 20 BRPM | BODY MASS INDEX: 21.28 KG/M2 | HEIGHT: 67 IN | HEART RATE: 95 BPM | DIASTOLIC BLOOD PRESSURE: 92 MMHG

## 2020-11-12 PROBLEM — K92.2 GIB (GASTROINTESTINAL BLEEDING): Status: RESOLVED | Noted: 2020-11-11 | Resolved: 2020-11-12

## 2020-11-12 LAB
ALBUMIN SERPL BCP-MCNC: 3.4 G/DL (ref 3.2–4.9)
ALBUMIN/GLOB SERPL: 0.6 G/DL
ALP SERPL-CCNC: 61 U/L (ref 30–99)
ALT SERPL-CCNC: 9 U/L (ref 2–50)
ANION GAP SERPL CALC-SCNC: 12 MMOL/L (ref 7–16)
AST SERPL-CCNC: 29 U/L (ref 12–45)
BILIRUB SERPL-MCNC: 0.5 MG/DL (ref 0.1–1.5)
BUN SERPL-MCNC: 22 MG/DL (ref 8–22)
CALCIUM SERPL-MCNC: 9.2 MG/DL (ref 8.4–10.2)
CHLORIDE SERPL-SCNC: 93 MMOL/L (ref 96–112)
CO2 SERPL-SCNC: 28 MMOL/L (ref 20–33)
CREAT SERPL-MCNC: 5.01 MG/DL (ref 0.5–1.4)
ERYTHROCYTE [DISTWIDTH] IN BLOOD BY AUTOMATED COUNT: 54.4 FL (ref 35.9–50)
GLOBULIN SER CALC-MCNC: 5.4 G/DL (ref 1.9–3.5)
GLUCOSE SERPL-MCNC: 79 MG/DL (ref 65–99)
HAV IGM SERPL QL IA: NORMAL
HBV CORE IGM SER QL: NORMAL
HBV SURFACE AB SERPL IA-ACNC: 1329 MIU/ML (ref 0–10)
HBV SURFACE AG SER QL: NORMAL
HCG UR QL: NEGATIVE
HCT VFR BLD AUTO: 28.5 % (ref 37–47)
HCV AB SER QL: NORMAL
HGB BLD-MCNC: 7.6 G/DL (ref 12–16)
HGB BLD-MCNC: 7.8 G/DL (ref 12–16)
HGB BLD-MCNC: 8.3 G/DL (ref 12–16)
HGB BLD-MCNC: 8.3 G/DL (ref 12–16)
MCH RBC QN AUTO: 26.3 PG (ref 27–33)
MCHC RBC AUTO-ENTMCNC: 29.1 G/DL (ref 33.6–35)
MCV RBC AUTO: 90.2 FL (ref 81.4–97.8)
PATHOLOGY CONSULT NOTE: NORMAL
PLATELET # BLD AUTO: 122 K/UL (ref 164–446)
PMV BLD AUTO: 10.1 FL (ref 9–12.9)
POTASSIUM SERPL-SCNC: 4.4 MMOL/L (ref 3.6–5.5)
PROT SERPL-MCNC: 8.8 G/DL (ref 6–8.2)
RBC # BLD AUTO: 3.16 M/UL (ref 4.2–5.4)
SODIUM SERPL-SCNC: 133 MMOL/L (ref 135–145)
WBC # BLD AUTO: 4.5 K/UL (ref 4.8–10.8)

## 2020-11-12 PROCEDURE — 160009 HCHG ANES TIME/MIN: Performed by: INTERNAL MEDICINE

## 2020-11-12 PROCEDURE — 160048 HCHG OR STATISTICAL LEVEL 1-5: Performed by: INTERNAL MEDICINE

## 2020-11-12 PROCEDURE — C9113 INJ PANTOPRAZOLE SODIUM, VIA: HCPCS | Performed by: HOSPITALIST

## 2020-11-12 PROCEDURE — A9270 NON-COVERED ITEM OR SERVICE: HCPCS | Performed by: INTERNAL MEDICINE

## 2020-11-12 PROCEDURE — 81025 URINE PREGNANCY TEST: CPT

## 2020-11-12 PROCEDURE — 0W3P8ZZ CONTROL BLEEDING IN GASTROINTESTINAL TRACT, VIA NATURAL OR ARTIFICIAL OPENING ENDOSCOPIC: ICD-10-PCS | Performed by: INTERNAL MEDICINE

## 2020-11-12 PROCEDURE — 88305 TISSUE EXAM BY PATHOLOGIST: CPT

## 2020-11-12 PROCEDURE — 700111 HCHG RX REV CODE 636 W/ 250 OVERRIDE (IP): Performed by: HOSPITALIST

## 2020-11-12 PROCEDURE — 80053 COMPREHEN METABOLIC PANEL: CPT

## 2020-11-12 PROCEDURE — 85027 COMPLETE CBC AUTOMATED: CPT

## 2020-11-12 PROCEDURE — 88312 SPECIAL STAINS GROUP 1: CPT

## 2020-11-12 PROCEDURE — 700102 HCHG RX REV CODE 250 W/ 637 OVERRIDE(OP): Performed by: HOSPITALIST

## 2020-11-12 PROCEDURE — 700102 HCHG RX REV CODE 250 W/ 637 OVERRIDE(OP): Performed by: INTERNAL MEDICINE

## 2020-11-12 PROCEDURE — 99232 SBSQ HOSP IP/OBS MODERATE 35: CPT | Performed by: INTERNAL MEDICINE

## 2020-11-12 PROCEDURE — 160035 HCHG PACU - 1ST 60 MINS PHASE I: Performed by: INTERNAL MEDICINE

## 2020-11-12 PROCEDURE — 85018 HEMOGLOBIN: CPT

## 2020-11-12 PROCEDURE — 700105 HCHG RX REV CODE 258: Performed by: INTERNAL MEDICINE

## 2020-11-12 PROCEDURE — 700101 HCHG RX REV CODE 250: Performed by: INTERNAL MEDICINE

## 2020-11-12 PROCEDURE — 700101 HCHG RX REV CODE 250: Performed by: STUDENT IN AN ORGANIZED HEALTH CARE EDUCATION/TRAINING PROGRAM

## 2020-11-12 PROCEDURE — 0DB68ZX EXCISION OF STOMACH, VIA NATURAL OR ARTIFICIAL OPENING ENDOSCOPIC, DIAGNOSTIC: ICD-10-PCS | Performed by: INTERNAL MEDICINE

## 2020-11-12 PROCEDURE — 160203 HCHG ENDO MINUTES - 1ST 30 MINS LEVEL 4: Performed by: INTERNAL MEDICINE

## 2020-11-12 PROCEDURE — 500066 HCHG BITE BLOCK, ECT: Performed by: INTERNAL MEDICINE

## 2020-11-12 PROCEDURE — A9270 NON-COVERED ITEM OR SERVICE: HCPCS | Performed by: HOSPITALIST

## 2020-11-12 PROCEDURE — 700111 HCHG RX REV CODE 636 W/ 250 OVERRIDE (IP): Performed by: STUDENT IN AN ORGANIZED HEALTH CARE EDUCATION/TRAINING PROGRAM

## 2020-11-12 PROCEDURE — 700105 HCHG RX REV CODE 258: Performed by: HOSPITALIST

## 2020-11-12 PROCEDURE — 160002 HCHG RECOVERY MINUTES (STAT): Performed by: INTERNAL MEDICINE

## 2020-11-12 PROCEDURE — 99239 HOSP IP/OBS DSCHRG MGMT >30: CPT | Performed by: HOSPITALIST

## 2020-11-12 RX ORDER — HALOPERIDOL 5 MG/ML
1 INJECTION INTRAMUSCULAR
Status: DISCONTINUED | OUTPATIENT
Start: 2020-11-12 | End: 2020-11-12 | Stop reason: HOSPADM

## 2020-11-12 RX ORDER — OMEPRAZOLE 20 MG/1
20 CAPSULE, DELAYED RELEASE ORAL 2 TIMES DAILY
Qty: 60 CAP | Refills: 0 | Status: ON HOLD | OUTPATIENT
Start: 2020-11-12 | End: 2021-01-09

## 2020-11-12 RX ORDER — MEPERIDINE HYDROCHLORIDE 25 MG/ML
12.5 INJECTION INTRAMUSCULAR; INTRAVENOUS; SUBCUTANEOUS
Status: DISCONTINUED | OUTPATIENT
Start: 2020-11-12 | End: 2020-11-12 | Stop reason: HOSPADM

## 2020-11-12 RX ORDER — SUCRALFATE ORAL 1 G/10ML
1 SUSPENSION ORAL 3 TIMES DAILY
Qty: 900 ML | Refills: 0 | Status: ON HOLD | OUTPATIENT
Start: 2020-11-12 | End: 2020-12-02

## 2020-11-12 RX ORDER — FERROUS GLUCONATE 324(38)MG
324 TABLET ORAL
Status: DISCONTINUED | OUTPATIENT
Start: 2020-11-12 | End: 2020-11-12 | Stop reason: HOSPADM

## 2020-11-12 RX ORDER — HYDROMORPHONE HYDROCHLORIDE 1 MG/ML
0.4 INJECTION, SOLUTION INTRAMUSCULAR; INTRAVENOUS; SUBCUTANEOUS
Status: DISCONTINUED | OUTPATIENT
Start: 2020-11-12 | End: 2020-11-12 | Stop reason: HOSPADM

## 2020-11-12 RX ORDER — FERROUS GLUCONATE 324(38)MG
324 TABLET ORAL
Qty: 90 TAB | Refills: 0 | Status: SHIPPED | OUTPATIENT
Start: 2020-11-12 | End: 2020-12-12

## 2020-11-12 RX ORDER — SODIUM CHLORIDE, SODIUM LACTATE, POTASSIUM CHLORIDE, CALCIUM CHLORIDE 600; 310; 30; 20 MG/100ML; MG/100ML; MG/100ML; MG/100ML
INJECTION, SOLUTION INTRAVENOUS CONTINUOUS
Status: DISCONTINUED | OUTPATIENT
Start: 2020-11-12 | End: 2020-11-12 | Stop reason: HOSPADM

## 2020-11-12 RX ORDER — AMOXICILLIN 250 MG
2 CAPSULE ORAL 2 TIMES DAILY
Status: DISCONTINUED | OUTPATIENT
Start: 2020-11-12 | End: 2020-11-12 | Stop reason: HOSPADM

## 2020-11-12 RX ORDER — POLYETHYLENE GLYCOL 3350 17 G/17G
1 POWDER, FOR SOLUTION ORAL DAILY
Status: DISCONTINUED | OUTPATIENT
Start: 2020-11-12 | End: 2020-11-12 | Stop reason: HOSPADM

## 2020-11-12 RX ORDER — SODIUM CHLORIDE 9 MG/ML
INJECTION, SOLUTION INTRAVENOUS ONCE
Status: COMPLETED | OUTPATIENT
Start: 2020-11-12 | End: 2020-11-12

## 2020-11-12 RX ORDER — AMOXICILLIN 250 MG
2 CAPSULE ORAL DAILY
Qty: 30 TAB | Refills: 0 | Status: ON HOLD | OUTPATIENT
Start: 2020-11-12 | End: 2021-01-09

## 2020-11-12 RX ORDER — DIPHENHYDRAMINE HYDROCHLORIDE 50 MG/ML
12.5 INJECTION INTRAMUSCULAR; INTRAVENOUS
Status: DISCONTINUED | OUTPATIENT
Start: 2020-11-12 | End: 2020-11-12 | Stop reason: HOSPADM

## 2020-11-12 RX ORDER — ONDANSETRON 2 MG/ML
4 INJECTION INTRAMUSCULAR; INTRAVENOUS
Status: DISCONTINUED | OUTPATIENT
Start: 2020-11-12 | End: 2020-11-12 | Stop reason: HOSPADM

## 2020-11-12 RX ORDER — BISACODYL 10 MG
10 SUPPOSITORY, RECTAL RECTAL
Status: DISCONTINUED | OUTPATIENT
Start: 2020-11-12 | End: 2020-11-12 | Stop reason: HOSPADM

## 2020-11-12 RX ORDER — HYDROMORPHONE HYDROCHLORIDE 1 MG/ML
0.2 INJECTION, SOLUTION INTRAMUSCULAR; INTRAVENOUS; SUBCUTANEOUS
Status: DISCONTINUED | OUTPATIENT
Start: 2020-11-12 | End: 2020-11-12 | Stop reason: HOSPADM

## 2020-11-12 RX ORDER — LIDOCAINE HYDROCHLORIDE 20 MG/ML
INJECTION, SOLUTION EPIDURAL; INFILTRATION; INTRACAUDAL; PERINEURAL PRN
Status: DISCONTINUED | OUTPATIENT
Start: 2020-11-12 | End: 2020-11-12 | Stop reason: SURG

## 2020-11-12 RX ORDER — HYDROMORPHONE HYDROCHLORIDE 1 MG/ML
0.1 INJECTION, SOLUTION INTRAMUSCULAR; INTRAVENOUS; SUBCUTANEOUS
Status: DISCONTINUED | OUTPATIENT
Start: 2020-11-12 | End: 2020-11-12 | Stop reason: HOSPADM

## 2020-11-12 RX ORDER — ONDANSETRON 4 MG/1
4 TABLET, ORALLY DISINTEGRATING ORAL EVERY 4 HOURS PRN
Qty: 10 TAB | Refills: 0 | Status: ON HOLD | OUTPATIENT
Start: 2020-11-12 | End: 2021-01-09

## 2020-11-12 RX ADMIN — MYCOPHENOLIC ACID 180 MG: 180 TABLET, DELAYED RELEASE ORAL at 18:00

## 2020-11-12 RX ADMIN — ATENOLOL 25 MG: 25 TABLET ORAL at 16:48

## 2020-11-12 RX ADMIN — PROPOFOL 20 MG: 10 INJECTION, EMULSION INTRAVENOUS at 09:30

## 2020-11-12 RX ADMIN — SODIUM CHLORIDE: 9 INJECTION, SOLUTION INTRAVENOUS at 09:17

## 2020-11-12 RX ADMIN — PROPOFOL 10 MG: 10 INJECTION, EMULSION INTRAVENOUS at 09:32

## 2020-11-12 RX ADMIN — PROPOFOL 30 MG: 10 INJECTION, EMULSION INTRAVENOUS at 09:22

## 2020-11-12 RX ADMIN — PROPOFOL 30 MG: 10 INJECTION, EMULSION INTRAVENOUS at 09:20

## 2020-11-12 RX ADMIN — LIDOCAINE HYDROCHLORIDE 80 MG: 20 INJECTION, SOLUTION EPIDURAL; INFILTRATION; INTRACAUDAL; PERINEURAL at 09:20

## 2020-11-12 RX ADMIN — PROPOFOL 20 MG: 10 INJECTION, EMULSION INTRAVENOUS at 09:25

## 2020-11-12 RX ADMIN — PROPOFOL 30 MG: 10 INJECTION, EMULSION INTRAVENOUS at 09:24

## 2020-11-12 RX ADMIN — FERROUS GLUCONATE TAB 324 MG (37.5 MG ELEMENTAL IRON) 324 MG: 324 (37.5 FE) TAB at 17:30

## 2020-11-12 RX ADMIN — FERROUS GLUCONATE TAB 324 MG (37.5 MG ELEMENTAL IRON) 324 MG: 324 (37.5 FE) TAB at 11:10

## 2020-11-12 RX ADMIN — SODIUM CHLORIDE 8 MG/HR: 9 INJECTION, SOLUTION INTRAVENOUS at 06:26

## 2020-11-12 RX ADMIN — ONDANSETRON HYDROCHLORIDE 4 MG: 2 SOLUTION INTRAMUSCULAR; INTRAVENOUS at 11:14

## 2020-11-12 RX ADMIN — HYDROXYCHLOROQUINE SULFATE 200 MG: 200 TABLET, FILM COATED ORAL at 16:48

## 2020-11-12 RX ADMIN — AMLODIPINE BESYLATE 10 MG: 5 TABLET ORAL at 16:48

## 2020-11-12 RX ADMIN — PROPOFOL 20 MG: 10 INJECTION, EMULSION INTRAVENOUS at 09:27

## 2020-11-12 RX ADMIN — POLYETHYLENE GLYCOL 3350 1 PACKET: 17 POWDER, FOR SOLUTION ORAL at 11:10

## 2020-11-12 RX ADMIN — PROPOFOL 40 MG: 10 INJECTION, EMULSION INTRAVENOUS at 09:23

## 2020-11-12 ASSESSMENT — ENCOUNTER SYMPTOMS
WHEEZING: 0
HEADACHES: 0
HEMOPTYSIS: 0
CLAUDICATION: 0
DEPRESSION: 0
MYALGIAS: 0
DOUBLE VISION: 0
VOMITING: 0
PALPITATIONS: 0
BLURRED VISION: 0
PND: 0
CHILLS: 0
SHORTNESS OF BREATH: 0
NAUSEA: 0
FEVER: 0
DIZZINESS: 0
BRUISES/BLEEDS EASILY: 0
HEARTBURN: 0
BACK PAIN: 0
COUGH: 0

## 2020-11-12 NOTE — PROGRESS NOTES
[facility] Medicine Daily Progress Note    Date of Service  11/12/2020    Chief Complaint  24 y.o. female admitted 11/11/2020 with GI bleed      Interval Problem Update  Patient is resting in bed, denies more hematemesis, no melena no hematochezia no dizziness no lightheadedness she is alert oriented follows commands.  Patient is going for EGD today, hemoglobin stable.    Consultants/Specialty  GI    Code Status  Full Code    Disposition  To be determined    Review of Systems  Review of Systems   Constitutional: Negative for chills and fever.   HENT: Negative for congestion.    Eyes: Negative for blurred vision and double vision.   Respiratory: Negative for cough, hemoptysis and wheezing.    Cardiovascular: Negative for chest pain, palpitations, claudication, leg swelling and PND.   Gastrointestinal: Negative for heartburn, nausea and vomiting.   Genitourinary: Negative for hematuria and urgency.   Musculoskeletal: Negative for back pain and myalgias.   Skin: Negative for rash.   Neurological: Negative for dizziness and headaches.   Endo/Heme/Allergies: Does not bruise/bleed easily.   Psychiatric/Behavioral: Negative for depression.        Physical Exam  Temp:  [36.6 °C (97.9 °F)-37 °C (98.6 °F)] 36.6 °C (97.9 °F)  Pulse:  [80-94] 88  Resp:  [16-18] 17  BP: (102-148)/(70-99) 114/82  SpO2:  [96 %-100 %] 96 %    Physical Exam  Vitals signs and nursing note reviewed.   Constitutional:       Appearance: Normal appearance.   HENT:      Head: Normocephalic.      Nose: Nose normal.      Mouth/Throat:      Pharynx: Oropharynx is clear.   Eyes:      General: No scleral icterus.     Conjunctiva/sclera: Conjunctivae normal.      Pupils: Pupils are equal, round, and reactive to light.   Neck:      Musculoskeletal: Normal range of motion and neck supple.   Cardiovascular:      Rate and Rhythm: Normal rate.   Pulmonary:      Effort: Pulmonary effort is normal. No respiratory distress.      Breath sounds: Normal breath sounds.    Abdominal:      General: Bowel sounds are normal. There is no distension.      Palpations: Abdomen is soft.      Tenderness: There is no abdominal tenderness.   Musculoskeletal: Normal range of motion.   Skin:     General: Skin is warm.      Capillary Refill: Capillary refill takes less than 2 seconds.   Neurological:      General: No focal deficit present.      Mental Status: She is alert and oriented to person, place, and time.      Cranial Nerves: No cranial nerve deficit.   Psychiatric:         Mood and Affect: Mood normal.         Fluids    Intake/Output Summary (Last 24 hours) at 11/12/2020 0919  Last data filed at 11/12/2020 0800  Gross per 24 hour   Intake 229.58 ml   Output 2500 ml   Net -2270.42 ml       Laboratory  Recent Labs     11/11/20  0730 11/11/20  1923 11/11/20  2359 11/12/20  0414   WBC 5.1  --   --  4.5*   RBC 3.42*  --   --  3.16*   HEMOGLOBIN 9.0* 8.7* 8.3* 8.3*   HEMATOCRIT 30.3*  --   --  28.5*   MCV 88.6  --   --  90.2   MCH 26.3*  --   --  26.3*   MCHC 29.7*  --   --  29.1*   RDW 52.9*  --   --  54.4*   PLATELETCT 133*  --   --  122*   MPV 10.2  --   --  10.1     Recent Labs     11/11/20  0730 11/12/20  0414   SODIUM 131* 133*   POTASSIUM 4.1 4.4   CHLORIDE 87* 93*   CO2 30 28   GLUCOSE 88 79   BUN 43* 22   CREATININE 7.42* 5.01*   CALCIUM 9.6 9.2     Recent Labs     11/11/20  0730   APTT 29.5   INR 1.00               Imaging  No orders to display        Assessment/Plan  * GIB (gastrointestinal bleeding)  Assessment & Plan  Possible UGIB, started on protonix drip, GI consulted, NPO.  Hb q4hrs  EGD today.    Lupus (HCC)- (present on admission)  Assessment & Plan  On hydroxychloroquine and mycophenolate.   Continue for now.       ESRD (end stage renal disease) on dialysis (HCC)- (present on admission)  Assessment & Plan  On HD MWF, nephro consulted.   Last dialysis yesterday    Anemia due to chronic kidney disease- (present on admission)  Assessment & Plan  Hemoglobin stable, going for EGD  today    Thrombocytopenia (HCC)- (present on admission)  Assessment & Plan  Probably due to ESRD, Lupus, medications, uremia  Stable      Hyponatremia- (present on admission)  Assessment & Plan  Due to ESRD. Continue monitoring.   Stable and improving    Hypertension- (present on admission)  Assessment & Plan  Continue home medications atenolol and amlodipine with holding parameter hold for less than 120 SBP.  Stable         VTE prophylaxis: SCDs    Case and plan of care discussed with nurse staff and during multidisciplinary rounds

## 2020-11-12 NOTE — PROGRESS NOTES
Nephrology Daily Progress Note    Date of Service  11/12/2020    Chief Complaint  24 y.o. female admitted 11/11/2020 with ESRD, GI bleed    Interval Problem Update  Pt is doing better  Had EGD earlier    Review of Systems  Review of Systems   Constitutional: Negative for chills, fever and malaise/fatigue.   Respiratory: Negative for cough and shortness of breath.    Cardiovascular: Negative for chest pain and leg swelling.   Gastrointestinal: Negative for nausea and vomiting.   Genitourinary: Negative for dysuria, frequency and urgency.        Physical Exam  Temp:  [36.1 °C (97 °F)-37.1 °C (98.7 °F)] 37.1 °C (98.7 °F)  Pulse:  [] 108  Resp:  [10-20] 20  BP: (102-146)/() 146/95  SpO2:  [96 %-100 %] 100 %    Physical Exam  Vitals signs and nursing note reviewed.   Constitutional:       General: She is not in acute distress.     Appearance: Normal appearance. She is well-developed. She is not diaphoretic.   HENT:      Head: Normocephalic and atraumatic.      Right Ear: External ear normal.      Left Ear: External ear normal.      Nose: Nose normal.   Eyes:      General: No scleral icterus.        Right eye: No discharge.         Left eye: No discharge.      Conjunctiva/sclera: Conjunctivae normal.   Cardiovascular:      Rate and Rhythm: Normal rate and regular rhythm.      Heart sounds: No murmur.   Pulmonary:      Effort: Pulmonary effort is normal. No respiratory distress.      Breath sounds: Normal breath sounds.   Musculoskeletal:         General: No tenderness.      Right lower leg: No edema.      Left lower leg: No edema.   Skin:     General: Skin is warm and dry.      Findings: No erythema.   Neurological:      General: No focal deficit present.      Mental Status: She is alert and oriented to person, place, and time.      Cranial Nerves: No cranial nerve deficit.   Psychiatric:         Mood and Affect: Mood normal.         Behavior: Behavior normal.         Fluids    Intake/Output Summary (Last 24  hours) at 11/12/2020 1525  Last data filed at 11/12/2020 0937  Gross per 24 hour   Intake 429.58 ml   Output 2500 ml   Net -2070.42 ml       Laboratory  Recent Labs     11/11/20 0730 11/11/20 0730 11/11/20  2359 11/12/20 0414 11/12/20  1056   WBC 5.1  --   --  4.5*  --    RBC 3.42*  --   --  3.16*  --    HEMOGLOBIN 9.0*   < > 8.3* 8.3* 7.8*   HEMATOCRIT 30.3*  --   --  28.5*  --    MCV 88.6  --   --  90.2  --    MCH 26.3*  --   --  26.3*  --    MCHC 29.7*  --   --  29.1*  --    RDW 52.9*  --   --  54.4*  --    PLATELETCT 133*  --   --  122*  --    MPV 10.2  --   --  10.1  --     < > = values in this interval not displayed.     Recent Labs     11/11/20 0730 11/12/20 0414   SODIUM 131* 133*   POTASSIUM 4.1 4.4   CHLORIDE 87* 93*   CO2 30 28   GLUCOSE 88 79   BUN 43* 22   CREATININE 7.42* 5.01*   CALCIUM 9.6 9.2     Recent Labs     11/11/20 0730   APTT 29.5   INR 1.00     No results for input(s): NTPROBNP in the last 72 hours.        Imaging  No orders to display         Assessment/Plan  1 ESRD  2 GI bleed  3 Hyponatremia  no acute need for HD today  Epo with HD  Renal diet  Daily labs  Renal dose all meds  Avoid nephrotoxins

## 2020-11-12 NOTE — PROGRESS NOTES
HD treatment today using RUAAVF.Treatment tolerated well.Net UF removed 2500 ml.Report given to GIANFRANCO Ascencio.

## 2020-11-12 NOTE — ANESTHESIA QCDR
2019 [facility] Clinical Data Registry (for Quality Improvement)     Postoperative nausea/vomiting risk protocol (Adult = 18 yrs and Pediatric 3-17 yrs)- (430 and 463)  General inhalation anesthetic (NOT TIVA) with PONV risk factors: No  Provision of anti-emetic therapy with at least 2 different classes of agents: N/A  Patient DID NOT receive anti-emetic therapy and reason is documented in Medical Record: N/A    Multimodal Pain Management- (477)  Non-emergent surgery AND patient age >= 18: Yes  Use of Multimodal Pain Management, two or more drugs and/or interventions, NOT including systemic opioids: Yes  Exception: Documented allergy to multiple classes of analgesics: N/A    Smoking Abstinence (404)  Patient is current smoker (cigarette, pipe, e-cig, marijuanna): No  Elective Surgery:   Abstinence instructions provided prior to day of surgery:   Patient abstained from smoking on day of surgery:     Pre-Op Beta-Blocker in Isolated CABG (44)  Isolated CABG AND patient age >= 18: No  Beta-blocker admin within 24 hours of surgical incision:   Exception:of medical reason(s) for not administering beta blocker within 24 hours prior to surgical incision (e.g., not  indicated,other medical reason):     PACU assessment of acute postoperative pain prior to Anesthesia Care End- Applies to Patients Age = 18- (ABG7)  Initial PACU pain score is which of the following: < 7/10  Patient unable to report pain score: N/A    Post-anesthetic transfer of care checklist/protocol to PACU/ICU- (426 and 427)  Upon conclusion of case, patient transferred to which of the following locations: PACU/Non-ICU  Use of transfer checklist/protocol: Yes  Exclusion: Service Performed in Patient Hospital Room (and thus did not require transfer): N/A  Unplanned admission to ICU related to anesthesia service up through end of PACU care- (MD51)  Unplanned admission to ICU (not initially anticipated at anesthesia start time): No

## 2020-11-12 NOTE — CONSULTS
DATE OF SERVICE:  11/11/2020    REQUESTING PHYSICIAN:  Niecy Sandoval MD    REASON FOR CONSULTATION:  Management of end-stage renal disease, assessing    the need for dialysis.    The patient seen and examined, medical record reviewed.    HISTORY OF PRESENT ILLNESS:  The patient is an unfortunate 24-year-old lady   was well known to our service.  She has a history of end-stage renal disease,   undergoes hemodialysis on Monday, Wednesday, and Friday.  The patient   presented to the hospital earlier this morning with GI bleed.  The patient has   been hospitalized and we were called to manage her kidney disease, assessing   the need for dialysis.    The patient has no fever, no chills, no nausea, no vomiting.    PAST MEDICAL HISTORY:  Significant for:  1.  End-stage renal disease.  2.  Anemia.  3.  Lupus.    ALLERGIES:  The list was reviewed.    SOCIAL HISTORY:  The patient has no smoking history.    FAMILY HISTORY:  Positive for diabetes.    MEDICATIONS:  Reviewed.    REVIEW OF SYSTEMS:  All systems were reviewed.  They were negative except   outlined in the history of present illness.    PHYSICAL EXAMINATION:  GENERAL:  The patient is in no apparent distress.  VITAL SIGNS:  Showed blood pressure of 144/100, heart rate was 85, respiratory   rate was 19.  HEENT:  Normocephalic, atraumatic.  Sclerae are anicteric.  Pupils are   reactive.  Nose is normal.  Mucous membranes are moist.  NECK:  No lymphadenopathy, no JVD, no thyroid mass.  CHEST:  Normal.  LUNGS:  Clear to auscultation.  HEART:  S1, S2.  ABDOMEN:  Soft, nontender, no hepatosplenomegaly.  There is no inguinal   lymphadenopathy.  EXTREMITIES:  There is trace lower extremity edema.    LABORATORY DATA:  Her recent labs were reviewed.    ASSESSMENT AND PLAN:  1.  End-stage renal disease.  2.  Gastrointestinal bleed.  3.  Uncontrolled hypertension.  4.  Anemia.    PLAN:  1.  We will plan on dialysis to manage her uremia and to improve platelet   function to help  with her bleeding disorder.  2.  Erythropoietin with dialysis.  3.  Ultrafiltration to help with uncontrolled hypertension.  4.  Renal diet when able to take oral.  5.  Daily labs.  6.  Renal dose all medications.  7.  We will evaluate daily the need for dialysis.  8.  Prognosis is guarded.    Plan discussed in detail with Dr. Sandoval.       ____________________________________     FADI NAJJAR, MD FN / MADHU    DD:  11/11/2020 14:44:07  DT:  11/11/2020 16:25:28    D#:  [id number]  Job#:  [id number]

## 2020-11-12 NOTE — ANESTHESIA POSTPROCEDURE EVALUATION
Patient: Lily Nicole    Procedure Summary     Date: 11/12/20 Room / Location:  ENDOSCOPIC ULTRASOUND ROOM / SURGERY [facility]    Anesthesia Start: 0917 Anesthesia Stop: 0937    Procedures:       GASTROSCOPY (Abdomen)      GASTROSCOPY, WITH ARGON PLASMA COAGULATION      GASTROSCOPY, WITH BIOPSY Diagnosis: (Hematemesis; Anemia and Gastric Angioectasia)    Surgeons: Gadiel Whitney M.D. Responsible Provider: Cornell Daigle M.D.    Anesthesia Type: general ASA Status: 3          Final Anesthesia Type: general  Last vitals  BP   Blood Pressure: 146/95, NIBP: 117/64    Temp   37.1 °C (98.7 °F)    Pulse   Pulse: (!) 108   Resp   20    SpO2   100 %      Anesthesia Post Evaluation    Patient location during evaluation: PACU  Patient participation: complete - patient participated  Level of consciousness: awake and alert    Airway patency: patent  Anesthetic complications: no  Cardiovascular status: hemodynamically stable  Respiratory status: acceptable  Hydration status: euvolemic    PONV: none           Nurse Pain Score: 0 (NPRS)

## 2020-11-12 NOTE — OR NURSING
0810: Report rcvd from MICHELLE Zarate. Pt will be brought to pre-op surgical area in time for her procedure via WC.  0905: Patient allergies and NPO status verified, home medication reconciliation completed and belongings secured. Patient verbalizes understanding of pain scale, expected course of stay and plan of care. Surgical site verified with patient. IV access patency verified.

## 2020-11-12 NOTE — ANESTHESIA TIME REPORT
Anesthesia Start and Stop Event Times     Date Time Event    11/12/2020 0916 Ready for Procedure     0917 Anesthesia Start     0937 Anesthesia Stop        Responsible Staff  11/12/20    Name Role Begin End    Cornell Daigle M.D. Anesth 0917 0937        Preop Diagnosis (Free Text):  Pre-op Diagnosis     Hematemesis; Anemia and Gastric Angioectasia        Preop Diagnosis (Codes):    Post op Diagnosis  Hematemesis      Premium Reason  Non-Premium    Comments:

## 2020-11-12 NOTE — DISCHARGE SUMMARY
"Discharge Summary    CHIEF COMPLAINT ON ADMISSION  Chief Complaint   Patient presents with   • Vomiting Blood     onset this morning.   • Abdominal Pain       Reason for Admission  Vomiting Blood     Admission Date  11/11/2020    CODE STATUS  Full Code    HPI & HOSPITAL COURSE  Please see original H&P and consult note for specific information, patient was admitted due to GI bleed, patient was started on IV Protonix, GI was consulted, patient has history of end-stage renal disease elevated BUN, nephrology was consulted for hemodialysis, patient went for EGD that showed hemorrhagic gastritis she is a status post argon plasma coagulation and biopsy no active bleeding, GI has cleared patient for discharge, recommended to continue PPI twice a day and sucralfate, patient is alert oriented follows commands she is able to tolerate diet, she was mildly tachycardic but right now her heart rate is in the 95, patient is eager to go home she denies any dizziness lightheadedness shortness of breath chest pain fever chills hemodynamically stable, she expressed understanding of her discharge plan and agree with it her questions have been answered.    Therefore, she is discharged in good and stable condition to home with close outpatient follow-up.    The patient recovered much more quickly than anticipated on admission.    Discharge Date  11/12/2020    FOLLOW UP ITEMS POST DISCHARGE  Primary care physician  GI  Nephrology    DISCHARGE DIAGNOSES  Principal Problem (Resolved):    GIB (gastrointestinal bleeding) POA: Unknown  Active Problems:    ESRD (end stage renal disease) on dialysis (HCC) POA: Yes    Lupus (HCC) POA: Yes    Hypertension POA: Yes      Overview: \"Controlled with medication\"    Hyponatremia POA: Yes    Thrombocytopenia (HCC) POA: Yes    Anemia due to chronic kidney disease POA: Yes      FOLLOW UP  No future appointments.  Ella Matthew M.D.  [address] [place] [address]  [place] [address]  [phone]    In 1 " week      Melinda Trent M.D.  [address]  [address]  [place] [id number] [phone]    In 1 week      Gadiel Whitney M.D.  [address]  [place] [address]  [phone]    In 1 week        MEDICATIONS ON DISCHARGE     Medication List      START taking these medications      Instructions   ferrous gluconate 324 (38 Fe) MG Tabs  Commonly known as: FERGON   Take 1 Tab by mouth 3 times a day, with meals for 30 days.  Dose: 324 mg     ondansetron 4 MG Tbdp  Commonly known as: ZOFRAN ODT   Take 1 Tab by mouth every four hours as needed for Nausea.  Dose: 4 mg     senna-docusate 8.6-50 MG Tabs  Commonly known as: PERICOLACE or SENOKOT S   Take 2 Tabs by mouth every day.  Dose: 2 Tab        CHANGE how you take these medications      Instructions   sucralfate 1 GM/10ML Susp  What changed: when to take this  Commonly known as: CARAFATE   Take 10 mL by mouth 3 times a day for 30 days.  Dose: 1 g        CONTINUE taking these medications      Instructions   acetaminophen 500 MG Tabs  Commonly known as: TYLENOL   Take 500 mg by mouth every 6 hours as needed for Moderate Pain.  Dose: 500 mg     amLODIPine 10 MG Tabs  Commonly known as: NORVASC   Take 10 mg by mouth every evening.  Dose: 10 mg     atenolol 25 MG Tabs  Commonly known as: TENORMIN   Take 25 mg by mouth every evening.  Dose: 25 mg     hydroxychloroquine 200 MG Tabs  Commonly known as: Plaquenil   Take 200 mg by mouth every evening.  Dose: 200 mg     mycophenolate 180 MG EC tablet  Commonly known as: Myfortic   Take 1 Tab by mouth every evening.  Dose: 180 mg     omeprazole 20 MG delayed-release capsule  Commonly known as: PRILOSEC   Take 1 Cap by mouth 2 times a day.  Dose: 20 mg            Allergies  Allergies   Allergen Reactions   • Clindamycin Rash     Hive   • Keflex Rash     Hives   • Methylprednisolone      Anxious;   • Metoprolol Nausea       DIET  Orders Placed This Encounter   Procedures   • Diet Order Diet: Renal     Standing Status:   Standing      Number of Occurrences:   1     Order Specific Question:   Diet:     Answer:   Renal [8]       ACTIVITY  As tolerated.  Weight bearing as tolerated    CONSULTATIONS  GI  Nephrology    PROCEDURES  EGD    LABORATORY  Lab Results   Component Value Date    SODIUM 133 (L) 11/12/2020    POTASSIUM 4.4 11/12/2020    CHLORIDE 93 (L) 11/12/2020    CO2 28 11/12/2020    GLUCOSE 79 11/12/2020    BUN 22 11/12/2020    CREATININE 5.01 (H) 11/12/2020        Lab Results   Component Value Date    WBC 4.5 (L) 11/12/2020    HEMOGLOBIN 7.8 (L) 11/12/2020    HEMATOCRIT 28.5 (L) 11/12/2020    PLATELETCT 122 (L) 11/12/2020        Total time of the discharge process exceeds 40 minutes.

## 2020-11-12 NOTE — CONSULTS
DATE OF SERVICE:  11/11/2020    GASTROENTEROLOGY CONSULTATION    REQUESTING PHYSICIAN:  Shimon Thomas MD    REASON FOR REQUEST:  Hematemesis.    HISTORY OF PRESENT ILLNESS:  The patient is a 24-year-old female with a long   history of systemic lupus erythematosus with lupus nephritis and subsequent   end-stage renal disease, on dialysis, awaiting transplant.  She has had   recurrent GI bleeding, is not ongoing chronic GI bleeding from gastric   arteriovenous malformations, over the last 1 year.  Over the last year, she   has had 3 separate endoscopies with lesion ablation of angioectasias and now   represents with hematemesis.  She describes a single episode of bright red   blood coming on spontaneously with preceding nausea.  She does admit to some   mild abdominal discomfort, describing more of a crampy, intermittent pain, but   more in the epigastric region.  She denies any fevers and chills.  No chest   pain or shortness of breath.  She has been taking her omeprazole twice daily   with good timing with food as prescribed.  She has been avoiding all NSAIDs   and also she has been taking her Carafate.  She is currently in the emergency   room getting dialysis actually and overall feeling well with no additional   episodes of hematemesis.  She denies any melena or hematochezia.  No   constipation or diarrhea.    REVIEW OF SYSTEMS:  Positive as noted above, otherwise complete review of   systems is negative at this time.    PAST MEDICAL HISTORY:  Includes SLE as well as end-stage renal disease, on   dialysis; lupus nephritis, gastric angioectasia, hypertension, seizure   disorder.    ALLERGIES:  TO CLINDAMYCIN, KEFLEX, METHYLPREDNISOLONE AND METOPROLOL.    PAST SURGICAL HISTORY:  AV fistula creation and laparoscopic cholecystectomy.    MEDICATIONS:  Include atenolol, Carafate, omeprazole twice daily,   mycophenolate and Plaquenil.  She does not take any drugs.  She does not smoke   cigarettes and does not  drink alcohol.    FAMILY MEDICAL HISTORY:  Diabetes mellitus.    PHYSICAL EXAMINATION:  VITAL SIGNS:  Blood pressure 148/99 with a heart rate of 80, respiratory rate   of 18, satting 97% on room air.  GENERAL:  She is a very pleasant 24-year-old  female in no acute   distress.  HEENT:  Reveals that her extraocular movements are intact bilaterally.  Her   sclerae are anicteric bilaterally.  Oral exam reveals a Mallampati 2 score   without oral lesions.  CARDIOVASCULAR:  Regular rate and rhythm without murmurs.  LUNGS:  Clear to auscultation bilaterally.  ABDOMEN:  Soft, nontender, no rebound, no guarding.  No hepatosplenomegaly or   masses appreciated.  EXTREMITIES:  Evaluation reveals no pitting edema.  SKIN:  Free of rashes.  No icterus.    LABORATORY DATA:  CBC reveals a white blood cell count 5.1, hemoglobin 9.0,   hematocrit 30, platelet count 133.  BMP reveals sodium 131, potassium 4.1,   chloride 87, bicarbonate 30, BUN 43, creatinine 7.42, glucose of 88.  Liver   enzymes are normal.  INR 1.0.  She is COVID-19 screening negative.    IMPRESSION, PLAN, AND MEDICAL DECISION MAKIN.  Hematemesis, I have to suspect bleeding from her known angioectasias,   although her hemoglobin is actually higher than the last check in September,   suspect again upper gastrointestinal bleeding.  I have discussed all of this   with the patient and suggested proceeding with EGD and we will set her up for   this tomorrow and we reviewed the indications as well as the risks, benefits,   and alternatives, and she indicates understanding of all this and agrees to   proceed.  Meanwhile, keep her n.p.o. I agree with the PPI drip and we will   restart her Carafate after the procedure.  May need to consider chronic iron   therapy accepting some degree of blood loss from these chronic angioectasias   at least until she can get her transplant and I will discuss this with her   after the procedure.  2.  Anemia, likely a mixed  anemia, both with iron deficiency from blood loss,   but also some contribution from her renal failure?  3.  Gastric angioectasia.  4.  End-stage renal disease.  5.  Systemic lupus erythematosus.  6.  Thrombocytopenia, likely secondary to her lupus.  7.  Hyponatremia.       ____________________________________     MD MAR MCMLULEN / MADHU    DD:  11/11/2020 15:20:30  DT:  11/11/2020 19:09:12    D#:  [id number]  Job#:  [id number]

## 2020-11-12 NOTE — OR SURGEON
Immediate Post OP Note    PreOp Diagnosis: Hematemesis    PostOp Diagnosis: Hemorrhagic gastritis    Procedure(s):  GASTROSCOPY - Wound Class: None  GASTROSCOPY, WITH ARGON PLASMA COAGULATION  GASTROSCOPY, WITH BIOPSY    Surgeon(s):  Gadiel Whitney M.D.    Anesthesiologist/Type of Anesthesia:  Anesthesiologist: Cornell Daigle M.D./* No anesthesia type entered *    Surgical Staff:  Circulator: Renan Mcbride R.N.  Endoscopy Technician: Ila Ragsdale    Specimens removed if any:  ID Type Source Tests Collected by Time Destination   A : Biopsy -Gastric  R/O H-pylori Other Other PATHOLOGY SPECIMEN Gadiel Whitney M.D. 11/12/2020  9:27 AM        Estimated Blood Loss: Minimal    Findings: 1) Hemorrhagic gastritis - not actively bleeding but there are several sites that appear to be at risk of further bleeding. - Treated with APC    Complications: None    Will SIGN OFF - I will arrange for follow up with us and will start her on PO Iron therapy that she should continue indefinitely. Because of this would like for her to continue the miralax daily and she should certainly stay on her twice daily PPI and carafate qid. I suspect that she can be discharged at least from a GI standpoint.    EMO         11/12/2020 9:37 AM Gadiel Whitney M.D.

## 2020-11-12 NOTE — PROCEDURES
DATE OF SERVICE:  11/12/2020    PROCEDURE PERFORMED:  EGD with biopsy and lesion ablation.    ENDOSCOPIST:  Gadiel Whitney MD    INDICATION:  Hematemesis and anemia.    MEDICATIONS:  Patient received general anesthesia by Dr. Cornell Daigle, please   see anesthesia flow sheets for details.    DESCRIPTION OF PROCEDURE:  The patient was informed in detail the indications   as well as the risks, the benefits, the alternatives of the procedure.  She   indicates understanding of all this and agrees to proceed.  Consent is signed   and placed on the chart.  After the patient was deemed adequately sedated, a   standard Olympus flexible gastroscope was lubricated and gently placed through   a bite block over the top of her tongue down into her esophagus.  From here,   the scope was carefully maneuvered through the esophagus into the stomach and   any residual fluid was thoroughly suctioned.  It was noted that there was no   sign of active bleeding or any recent blood loss.  There was some bile-stained   fluid in the stomach.  Scope was then maneuvered through the pylorus into the   duodenum and farthest reach of the endoscope was second portion of the   duodenum.  No abnormalities were noted.  Air was insufflated.  Photographs   obtained.  The scope was then slowly and carefully withdrawn looking mucosa in   a circumferential methodic manner throughout.  No abnormalities were noted in   the duodenum.  The scope was withdrawn back into the stomach, whereby careful   inspection revealed evidence of some probably just hemorrhagic gastritis.    Biopsies were obtained to rule out H. pylori.  In addition, several areas,   mostly in the proximal stomach appeared to be the most likely cause of her   recurrent bleeding and anemia and hence an argon plasma coagulation, a   circumferential catheter was utilized to treat several of the most problematic   appearing sites.  There was no posttreatment bleeding.  Retroflexion was   performed.   There were no additional findings.  Air was suctioned out of the   stomach and the scope withdrawn and a careful inspection of the esophagus   revealed no abnormalities.  The scope was then withdrawn and procedure   terminated.    FINDINGS:  1.  Hemorrhagic gastritis, now status post APC treatment of several sites as   well as biopsies.  2.  No active bleeding at this time.  Therapy, lesion ablation, or hemorrhagic   control performed as noted above.    COMPLICATIONS:  None.    TISSUE/BIOPSIES:  Gastric biopsies, rule out H. pylori.    IMPRESSION/PLAN/MEDICAL DECISION MAKIN.  Hematemesis.  I really suspect more of a scant hematemesis picture here,   likely from these sites identified in her hemorrhagic gastritis, but may   almost be more of emetic injury pattern.  At any rate, I think she can be   discharged home.  I would like to add to her PPI and Carafate therapy, t.i.d.   iron to see if we can build up some of her iron stores to perhaps offset any   small amounts of blood loss and will also add MiraLax while she is taking the   iron.  2.  Hemorrhagic gastropathy.  See discussion above.  3.  Anemia and chronic disease.  I do suspect she has a mixed anemia, not only   for some blood loss in her stomach  here, but also from anemia of chronic   disease and kidney disease.  4.  End-stage renal disease.  5.  Systemic lupus erythematosus.  6.  Thrombocytopenia.    At this time, we will sign off.  Please call with any questions.       ____________________________________     MD MAR MCMULLEN / MADHU    DD:  2020 09:51:59  DT:  2020 15:35:37    D#:  [id number]  Job#:  [id number]

## 2020-11-12 NOTE — OR NURSING
0936:  To PACU from [facility] via [facility], respirations spontaneous and non-labored    0941:  Pt rouses spontaneously.  No c/o pain or nausea.  Weaning O2 as tolerated.    0953:  Dr Whitney at bedside    0959:  Meets criteria to transfer to floor.

## 2020-11-13 NOTE — DISCHARGE INSTRUCTIONS
Discharge Instructions per Shimon Thomas M.D.    Follow-up with primary care physician in 1 week  Follow-up with nephrology  Follow-up with GI    DIET: Healthy diet    ACTIVITY: As tolerated    DIAGNOSIS: GI bleed    Return to ER if symptoms return, abdominal pain, vomiting blood, dark stools, blood in stools, dizziness, lightheadedness, shortness of breath, chest pain      Discharge Instructions    Discharged to home by taxi with relative. Discharged via wheelchair, hospital escort: Yes.  Special equipment needed: Not Applicable    Be sure to schedule a follow-up appointment with your primary care doctor or any specialists as instructed.     Discharge Plan:   Diet Plan: Discussed  Activity Level: Discussed  Confirmed Follow up Appointment: Patient to Call and Schedule Appointment  Confirmed Symptoms Management: Discussed  Medication Reconciliation Updated: Yes  Influenza Vaccine Indication: Not indicated: Previously immunized this influenza season and > 8 years of age    I understand that a diet low in cholesterol, fat, and sodium is recommended for good health. Unless I have been given specific instructions below for another diet, I accept this instruction as my diet prescription.   Other diet: renal    · Is patient discharged on Warfarin / Coumadin?   No     Depression / Suicide Risk    As you are discharged from this [facility] facility, it is important to learn how to keep safe from harming yourself.    Recognize the warning signs:  · Abrupt changes in personality, positive or negative- including increase in energy   · Giving away possessions  · Change in eating patterns- significant weight changes-  positive or negative  · Change in sleeping patterns- unable to sleep or sleeping all the time   · Unwillingness or inability to communicate  · Depression  · Unusual sadness, discouragement and loneliness  · Talk of wanting to die  · Neglect of personal appearance   · Rebelliousness- reckless  behavior  · Withdrawal from people/activities they love  · Confusion- inability to concentrate     If you or a loved one observes any of these behaviors or has concerns about self-harm, here's what you can do:  · Talk about it- your feelings and reasons for harming yourself  · Remove any means that you might use to hurt yourself (examples: pills, rope, extension cords, firearm)  · Get professional help from the community (Mental Health, Substance Abuse, psychological counseling)  · Do not be alone:Call your Safe Contact- someone whom you trust who will be there for you.  · Call your local CRISIS HOTLINE 567-8549 or 363-584-6195  · Call your local Children's Mobile Crisis Response Team [place] (694) 703-2149 or www.Smartaxi  · Call the toll free National Suicide Prevention Hotlines   · National Suicide Prevention Lifeline 113-833-JCPG (9632)  · [employer] Line Network 800-YBOEBGF (429-5291)      Gastrointestinal Bleeding  Gastrointestinal (GI) bleeding is bleeding somewhere along the digestive tract, between the mouth and the anus. This tract includes the mouth, esophagus, stomach, small intestine, large intestine, and anus. The large intestine is often called the colon.  GI bleeding can be caused by various problems. The severity of these problems can range from mild to serious or even life-threatening. If you have GI bleeding, you may find blood in your stools (feces), you may have black stools, or you may vomit blood. If there is a lot of bleeding, you may need to stay in the hospital.  What are the causes?  This condition may be caused by:  · Inflammation, irritation, or swelling of the esophagus (esophagitis). The esophagus is part of the body that moves food from your mouth to your stomach.  · Swollen veins in the rectum (hemorrhoids).  · Areas of painful tearing in the anus that are often caused by passing hard stool (anal fissures).  · Pouches that form on the colon over time, with age, and  may bleed a lot (diverticulosis).  · Inflammation (diverticulitis) in areas with diverticulosis. This can cause pain, fever, and bloody stools, although bleeding may be mild.  · Growths (polyps) or cancer. Colon cancer often starts out as precancerous polyps.  · Gastritis and ulcers. With these, bleeding may come from the upper GI tract, near the stomach.  What increases the risk?  You are more likely to develop this condition if you:  · Have an infection in your stomach from a type of bacteria called Helicobacter pylori.  · Take certain medicines, such as:  ? NSAIDs.  ? Aspirin.  ? Selective serotonin reuptake inhibitors (SSRIs).  ? Steroids.  ? Antiplatelet or anticoagulant medicines.  · Smoke.  · Drink alcohol.  What are the signs or symptoms?  Common symptoms of this condition include:  · Bright red blood in your vomit, or vomit that looks like coffee grounds.  · Bloody, black, or tarry stools.  ? Bleeding from the lower GI tract will usually cause red or maroon blood in the stools.  ? Bleeding from the upper GI tract may cause black, tarry stools that are often stronger smelling than usual.  ? In certain cases, if the bleeding is fast enough, the stools may be red.  · Pain or cramping in the abdomen.  How is this diagnosed?  This condition may be diagnosed based on:  · Your medical history and a physical exam.  · Various tests, such as:  ? Blood tests.  ? Stool tests.  ? X-rays and other imaging tests.  ? Esophagogastroduodenoscopy (EGD). In this test, a flexible, lighted tube is used to look at your esophagus, stomach, and small intestine.  ? Colonoscopy. In this test, a flexible, lighted tube is used to look at your colon.  How is this treated?  Treatment for this condition depends on the cause of the bleeding. For example:  · For bleeding from the esophagus, stomach, small intestine, or colon, the health care provider doing your EGD or colonoscopy may be able to stop the bleeding as part of the  procedure.  · Inflammation or infection of the colon can be treated with medicines.  · Certain rectal problems can be treated with creams, suppositories, or warm baths.  · Medicines may be given to reduce acid in your stomach.  · Surgery is sometimes needed.  · Blood transfusions are sometimes needed if a lot of blood has been lost.  If bleeding is mild, you may be allowed to go home. If there is a lot of bleeding, you will need to stay in the hospital for observation.  Follow these instructions at home:    · Take over-the-counter and prescription medicines only as told by your health care provider.  · Eat foods that are high in fiber, such as beans, whole grains, and fresh fruits and vegetables. This will help to keep your stools soft. Eating 1-3 prunes each day works well for many people.  · Drink enough fluid to keep your urine pale yellow.  · Keep all follow-up visits as told by your health care provider. This is important.  Contact a health care provider if:  · Your symptoms do not improve.  Get help right away if:  · Your bleeding does not stop.  · You feel light-headed or you faint.  · You feel weak.  · You have severe cramps in your back or abdomen.  · You pass large blood clots in your stool.  · Your symptoms are getting worse.  · You have chest pain or fast heartbeats.  Summary  · Gastrointestinal (GI) bleeding is bleeding somewhere along the digestive tract, between the mouth and anus. GI bleeding can be caused by various problems. The severity of these problems can range from mild to serious or even life-threatening.  · Treatment for this condition depends on the cause of the bleeding.  · Take over-the-counter and prescription medicines only as told by your health care provider.  · Keep all follow-up visits as told by your health care provider. This is important.  · Get help right away if your bleeding increases, your symptoms are getting worse, or you have new symptoms.  This information is not intended  to replace advice given to you by your health care provider. Make sure you discuss any questions you have with your health care provider.  Document Released: 12/15/2001 Document Revised: 07/31/2019 Document Reviewed: 07/31/2019  Elsevier Patient Education © 2020 [employer]

## 2020-11-13 NOTE — PROGRESS NOTES
Patient discharged home by the Physician.  Discharge instructions reviewed with patient including when to follow up with PCP, nephrology, and GI, new medications, and signs/symptoms to watch for.  Patient verbalized understanding.  PIV removed.  Tele box removed; monitor room notified. Patient transferred to [facility] via wheelchair for discharge.

## 2020-11-21 ENCOUNTER — HOSPITAL ENCOUNTER (OUTPATIENT)
Dept: LAB | Facility: MEDICAL CENTER | Age: 25
End: 2020-11-21
Attending: INTERNAL MEDICINE
Payer: COMMERCIAL

## 2020-11-21 LAB
ALBUMIN SERPL BCP-MCNC: 3.7 G/DL (ref 3.2–4.9)
ALBUMIN/GLOB SERPL: 0.7 G/DL
ALP SERPL-CCNC: 68 U/L (ref 30–99)
ALT SERPL-CCNC: 10 U/L (ref 2–50)
ANION GAP SERPL CALC-SCNC: 8 MMOL/L (ref 7–16)
ANISOCYTOSIS BLD QL SMEAR: ABNORMAL
AST SERPL-CCNC: 25 U/L (ref 12–45)
BASOPHILS # BLD AUTO: 1 % (ref 0–1.8)
BASOPHILS # BLD: 0.05 K/UL (ref 0–0.12)
BILIRUB SERPL-MCNC: 0.4 MG/DL (ref 0.1–1.5)
BUN SERPL-MCNC: 20 MG/DL (ref 8–22)
C3 SERPL-MCNC: 99.7 MG/DL (ref 87–200)
C4 SERPL-MCNC: 13.6 MG/DL (ref 19–52)
CALCIUM SERPL-MCNC: 9.7 MG/DL (ref 8.5–10.5)
CHLORIDE SERPL-SCNC: 90 MMOL/L (ref 96–112)
CO2 SERPL-SCNC: 33 MMOL/L (ref 20–33)
COMMENT 1642: NORMAL
CREAT SERPL-MCNC: 5.14 MG/DL (ref 0.5–1.4)
CRP SERPL HS-MCNC: 1.12 MG/DL (ref 0–0.75)
EOSINOPHIL # BLD AUTO: 0.03 K/UL (ref 0–0.51)
EOSINOPHIL NFR BLD: 0.6 % (ref 0–6.9)
ERYTHROCYTE [DISTWIDTH] IN BLOOD BY AUTOMATED COUNT: 60 FL (ref 35.9–50)
ERYTHROCYTE [SEDIMENTATION RATE] IN BLOOD BY WESTERGREN METHOD: >120 MM/HOUR (ref 0–20)
GLOBULIN SER CALC-MCNC: 5.2 G/DL (ref 1.9–3.5)
GLUCOSE SERPL-MCNC: 83 MG/DL (ref 65–99)
HCT VFR BLD AUTO: 23.1 % (ref 37–47)
HGB BLD-MCNC: 6.6 G/DL (ref 12–16)
HYPOCHROMIA BLD QL SMEAR: ABNORMAL
IMM GRANULOCYTES # BLD AUTO: 0.03 K/UL (ref 0–0.11)
IMM GRANULOCYTES NFR BLD AUTO: 0.6 % (ref 0–0.9)
LYMPHOCYTES # BLD AUTO: 0.71 K/UL (ref 1–4.8)
LYMPHOCYTES NFR BLD: 14.8 % (ref 22–41)
MACROCYTES BLD QL SMEAR: ABNORMAL
MCH RBC QN AUTO: 26.9 PG (ref 27–33)
MCHC RBC AUTO-ENTMCNC: 28.6 G/DL (ref 33.6–35)
MCV RBC AUTO: 94.3 FL (ref 81.4–97.8)
MONOCYTES # BLD AUTO: 0.36 K/UL (ref 0–0.85)
MONOCYTES NFR BLD AUTO: 7.5 % (ref 0–13.4)
MORPHOLOGY BLD-IMP: NORMAL
NEUTROPHILS # BLD AUTO: 3.61 K/UL (ref 2–7.15)
NEUTROPHILS NFR BLD: 75.5 % (ref 44–72)
NRBC # BLD AUTO: 0 K/UL
NRBC BLD-RTO: 0 /100 WBC
PLATELET # BLD AUTO: 114 K/UL (ref 164–446)
PLATELET BLD QL SMEAR: NORMAL
PMV BLD AUTO: 11.5 FL (ref 9–12.9)
POIKILOCYTOSIS BLD QL SMEAR: NORMAL
POLYCHROMASIA BLD QL SMEAR: NORMAL
POTASSIUM SERPL-SCNC: 4.4 MMOL/L (ref 3.6–5.5)
PROT SERPL-MCNC: 8.9 G/DL (ref 6–8.2)
RBC # BLD AUTO: 2.45 M/UL (ref 4.2–5.4)
RBC BLD AUTO: PRESENT
SODIUM SERPL-SCNC: 131 MMOL/L (ref 135–145)
WBC # BLD AUTO: 4.8 K/UL (ref 4.8–10.8)

## 2020-11-21 PROCEDURE — 85652 RBC SED RATE AUTOMATED: CPT

## 2020-11-21 PROCEDURE — 36415 COLL VENOUS BLD VENIPUNCTURE: CPT

## 2020-11-21 PROCEDURE — 86160 COMPLEMENT ANTIGEN: CPT | Mod: 91

## 2020-11-21 PROCEDURE — 86140 C-REACTIVE PROTEIN: CPT

## 2020-11-21 PROCEDURE — 85025 COMPLETE CBC W/AUTO DIFF WBC: CPT

## 2020-11-21 PROCEDURE — 80053 COMPREHEN METABOLIC PANEL: CPT

## 2020-11-27 ENCOUNTER — TELEPHONE (OUTPATIENT)
Dept: ONCOLOGY | Facility: MEDICAL CENTER | Age: 25
End: 2020-11-27

## 2020-11-27 NOTE — TELEPHONE ENCOUNTER
"Called patient regarding COVID-19 screening questions and updated check-in process. Patient answered \"no\" to screening questions. Patient states understanding of updated check-in process and is aware to call provided number. She is ok to proceed with treatment as scheduled for 11/28/20.  "

## 2020-11-28 ENCOUNTER — OUTPATIENT INFUSION SERVICES (OUTPATIENT)
Dept: ONCOLOGY | Facility: MEDICAL CENTER | Age: 25
End: 2020-11-28
Attending: INTERNAL MEDICINE
Payer: COMMERCIAL

## 2020-11-28 VITALS
HEIGHT: 67 IN | TEMPERATURE: 97.9 F | WEIGHT: 132.94 LBS | DIASTOLIC BLOOD PRESSURE: 91 MMHG | SYSTOLIC BLOOD PRESSURE: 144 MMHG | RESPIRATION RATE: 18 BRPM | OXYGEN SATURATION: 100 % | BODY MASS INDEX: 20.87 KG/M2 | HEART RATE: 88 BPM

## 2020-11-28 DIAGNOSIS — N18.6 ANEMIA DUE TO CHRONIC KIDNEY DISEASE, ON CHRONIC DIALYSIS (HCC): ICD-10-CM

## 2020-11-28 DIAGNOSIS — D63.1 ANEMIA DUE TO CHRONIC KIDNEY DISEASE, ON CHRONIC DIALYSIS (HCC): ICD-10-CM

## 2020-11-28 DIAGNOSIS — Z99.2 ANEMIA DUE TO CHRONIC KIDNEY DISEASE, ON CHRONIC DIALYSIS (HCC): ICD-10-CM

## 2020-11-28 LAB
ABO GROUP BLD: NORMAL
ANISOCYTOSIS BLD QL SMEAR: ABNORMAL
BARCODED ABORH UBTYP: 5100
BARCODED PRD CODE UBPRD: NORMAL
BARCODED UNIT NUM UBUNT: NORMAL
BASOPHILS # BLD AUTO: 1 % (ref 0–1.8)
BASOPHILS # BLD: 0.04 K/UL (ref 0–0.12)
BLD GP AB SCN SERPL QL: NORMAL
COMMENT 1642: NORMAL
COMPONENT R 8504R: NORMAL
EOSINOPHIL # BLD AUTO: 0.04 K/UL (ref 0–0.51)
EOSINOPHIL NFR BLD: 1 % (ref 0–6.9)
ERYTHROCYTE [DISTWIDTH] IN BLOOD BY AUTOMATED COUNT: 72 FL (ref 35.9–50)
HCT VFR BLD AUTO: 19.1 % (ref 37–47)
HGB BLD-MCNC: 5.4 G/DL (ref 12–16)
HYPOCHROMIA BLD QL SMEAR: ABNORMAL
IMM GRANULOCYTES # BLD AUTO: 0.02 K/UL (ref 0–0.11)
IMM GRANULOCYTES NFR BLD AUTO: 0.5 % (ref 0–0.9)
LYMPHOCYTES # BLD AUTO: 0.93 K/UL (ref 1–4.8)
LYMPHOCYTES NFR BLD: 22.1 % (ref 22–41)
MACROCYTES BLD QL SMEAR: ABNORMAL
MCH RBC QN AUTO: 26.9 PG (ref 27–33)
MCHC RBC AUTO-ENTMCNC: 27.9 G/DL (ref 33.6–35)
MCV RBC AUTO: 96.4 FL (ref 81.4–97.8)
MONOCYTES # BLD AUTO: 0.3 K/UL (ref 0–0.85)
MONOCYTES NFR BLD AUTO: 7.1 % (ref 0–13.4)
MORPHOLOGY BLD-IMP: NORMAL
NEUTROPHILS # BLD AUTO: 2.88 K/UL (ref 2–7.15)
NEUTROPHILS NFR BLD: 68.3 % (ref 44–72)
NRBC # BLD AUTO: 0 K/UL
NRBC BLD-RTO: 0 /100 WBC
OVALOCYTES BLD QL SMEAR: NORMAL
PLATELET # BLD AUTO: 120 K/UL (ref 164–446)
PLATELET BLD QL SMEAR: NORMAL
PMV BLD AUTO: 11.1 FL (ref 9–12.9)
POIKILOCYTOSIS BLD QL SMEAR: NORMAL
POLYCHROMASIA BLD QL SMEAR: NORMAL
PRODUCT TYPE UPROD: NORMAL
RBC # BLD AUTO: 1.97 M/UL (ref 4.2–5.4)
RBC BLD AUTO: PRESENT
RH BLD: NORMAL
UNIT STATUS USTAT: NORMAL
WBC # BLD AUTO: 4.2 K/UL (ref 4.8–10.8)

## 2020-11-28 PROCEDURE — 700105 HCHG RX REV CODE 258: Performed by: INTERNAL MEDICINE

## 2020-11-28 PROCEDURE — 86922 COMPATIBILITY TEST ANTIGLOB: CPT

## 2020-11-28 PROCEDURE — 700111 HCHG RX REV CODE 636 W/ 250 OVERRIDE (IP): Performed by: INTERNAL MEDICINE

## 2020-11-28 PROCEDURE — 36430 TRANSFUSION BLD/BLD COMPNT: CPT

## 2020-11-28 PROCEDURE — 96374 THER/PROPH/DIAG INJ IV PUSH: CPT

## 2020-11-28 PROCEDURE — 700102 HCHG RX REV CODE 250 W/ 637 OVERRIDE(OP): Performed by: INTERNAL MEDICINE

## 2020-11-28 PROCEDURE — 36415 COLL VENOUS BLD VENIPUNCTURE: CPT

## 2020-11-28 PROCEDURE — P9016 RBC LEUKOCYTES REDUCED: HCPCS | Mod: 91

## 2020-11-28 PROCEDURE — 85025 COMPLETE CBC W/AUTO DIFF WBC: CPT

## 2020-11-28 PROCEDURE — 86902 BLOOD TYPE ANTIGEN DONOR EA: CPT | Mod: 91

## 2020-11-28 PROCEDURE — 86900 BLOOD TYPING SEROLOGIC ABO: CPT

## 2020-11-28 PROCEDURE — 86850 RBC ANTIBODY SCREEN: CPT

## 2020-11-28 PROCEDURE — 86901 BLOOD TYPING SEROLOGIC RH(D): CPT

## 2020-11-28 PROCEDURE — A9270 NON-COVERED ITEM OR SERVICE: HCPCS | Performed by: INTERNAL MEDICINE

## 2020-11-28 PROCEDURE — 306780 HCHG STAT FOR TRANSFUSION PER CASE

## 2020-11-28 RX ORDER — ACETAMINOPHEN 325 MG/1
650 TABLET ORAL ONCE
Status: COMPLETED | OUTPATIENT
Start: 2020-11-28 | End: 2020-11-28

## 2020-11-28 RX ADMIN — ACETAMINOPHEN 650 MG: 325 TABLET ORAL at 10:06

## 2020-11-28 RX ADMIN — DIPHENHYDRAMINE HYDROCHLORIDE 25 MG: 50 INJECTION INTRAMUSCULAR; INTRAVENOUS at 10:06

## 2020-11-28 ASSESSMENT — FIBROSIS 4 INDEX: FIB4 SCORE: 1.6

## 2020-11-28 NOTE — PROGRESS NOTES
"Patient to \[facility]\"" for 2 units of PRBC's and lab drawn. PIV inserted into left wrist, flushed with + blood return, labs drawn. HGB 5.4,  called with results. Spoke with the oncall dr. He stated that he would let Dr. Mathew know and call me back if he wanted anything different than the 2 units of PRBC's. Premeds given. 1st unit of PRBC's infusing. /74   Pulse 93   Temp 36.7 °C (98.1 °F) (Temporal)   Resp 18   Ht 1.7 m (5' 6.93\")   Wt 60.3 kg (132 lb 15 oz)   SpO2 98%   BMI 20.87 kg/m²   15 min, vitals, no s/s of infusion reaction. Increased rate to 220ml/hr. First unit infused with no s/s of infusion reaction. /90   Pulse 94   Temp 36.4 °C (97.6 °F) (Temporal)   Resp 18   Ht 1.7 m (5' 6.93\")   Wt 60.3 kg (132 lb 15 oz)   SpO2 100%   BMI 20.87 kg/m²   The patient has antibodies in her blood so it took all day to get the units of blood. Patient should come in the day before and do COD so that she does not have to wait all day.   2nd unit of PRBC's infusing. 15 min vitals taken /90   Pulse 91   Temp 36.4 °C (97.5 °F) (Skin)   Resp 18   Ht 1.7 m (5' 6.93\")   Wt 60.3 kg (132 lb 15 oz)   SpO2 100%   BMI 20.87 kg/m²   No s/s of infusion reaction. Rate increased to 220ml/hr then to 250ml/hr. Selam to take over care.  "

## 2020-11-28 NOTE — PROGRESS NOTES
Yajaira from Lab called with critical result of HGB 5.4 at 0927. Critical lab result read back to Yajaira.   Dr. Mathew notified of critical lab result at 0945.  Critical lab result read back by Dr. Mathew.

## 2020-11-29 NOTE — PROGRESS NOTES
Report rec'd from Zonia MARTINEZ. 2nd unit finished without issue. PIV flushed and removed, gauze drsg placed. No further appts needed at this time, pt to follow up with MD. Left on foot to self care.

## 2020-12-01 ENCOUNTER — HOSPITAL ENCOUNTER (OUTPATIENT)
Facility: MEDICAL CENTER | Age: 25
End: 2020-12-02
Attending: EMERGENCY MEDICINE | Admitting: HOSPITALIST
Payer: COMMERCIAL

## 2020-12-01 ENCOUNTER — APPOINTMENT (OUTPATIENT)
Dept: RADIOLOGY | Facility: MEDICAL CENTER | Age: 25
End: 2020-12-01
Attending: EMERGENCY MEDICINE
Payer: COMMERCIAL

## 2020-12-01 DIAGNOSIS — N18.9 CHRONIC KIDNEY DISEASE, UNSPECIFIED CKD STAGE: ICD-10-CM

## 2020-12-01 DIAGNOSIS — E87.1 HYPONATREMIA: ICD-10-CM

## 2020-12-01 DIAGNOSIS — R79.89 ELEVATED LACTIC ACID LEVEL: ICD-10-CM

## 2020-12-01 DIAGNOSIS — J81.0 ACUTE PULMONARY EDEMA (HCC): ICD-10-CM

## 2020-12-01 DIAGNOSIS — D64.9 ANEMIA, UNSPECIFIED TYPE: ICD-10-CM

## 2020-12-01 DIAGNOSIS — R09.02 HYPOXIA: ICD-10-CM

## 2020-12-01 DIAGNOSIS — R06.01 ORTHOPNEA: ICD-10-CM

## 2020-12-01 DIAGNOSIS — R79.89 ELEVATED TROPONIN: ICD-10-CM

## 2020-12-01 DIAGNOSIS — R06.09 DYSPNEA ON EXERTION: ICD-10-CM

## 2020-12-01 DIAGNOSIS — R79.89 ELEVATED PROCALCITONIN: ICD-10-CM

## 2020-12-01 DIAGNOSIS — D69.6 THROMBOCYTOPENIA (HCC): ICD-10-CM

## 2020-12-01 LAB
ALBUMIN SERPL BCP-MCNC: 3.5 G/DL (ref 3.2–4.9)
ALBUMIN/GLOB SERPL: 0.6 G/DL
ALP SERPL-CCNC: 82 U/L (ref 30–99)
ALT SERPL-CCNC: 10 U/L (ref 2–50)
ANION GAP SERPL CALC-SCNC: 17 MMOL/L (ref 7–16)
AST SERPL-CCNC: 30 U/L (ref 12–45)
BASOPHILS # BLD AUTO: 0.9 % (ref 0–1.8)
BASOPHILS # BLD: 0.07 K/UL (ref 0–0.12)
BILIRUB SERPL-MCNC: 0.6 MG/DL (ref 0.1–1.5)
BUN SERPL-MCNC: 56 MG/DL (ref 8–22)
CALCIUM SERPL-MCNC: 9.4 MG/DL (ref 8.4–10.2)
CHLORIDE SERPL-SCNC: 87 MMOL/L (ref 96–112)
CO2 SERPL-SCNC: 25 MMOL/L (ref 20–33)
COVID ORDER STATUS COVID19: NORMAL
CREAT SERPL-MCNC: 6.74 MG/DL (ref 0.5–1.4)
EKG IMPRESSION: NORMAL
EOSINOPHIL # BLD AUTO: 0.02 K/UL (ref 0–0.51)
EOSINOPHIL NFR BLD: 0.2 % (ref 0–6.9)
ERYTHROCYTE [DISTWIDTH] IN BLOOD BY AUTOMATED COUNT: 66.7 FL (ref 35.9–50)
FLUAV RNA SPEC QL NAA+PROBE: NEGATIVE
FLUBV RNA SPEC QL NAA+PROBE: NEGATIVE
GLOBULIN SER CALC-MCNC: 5.5 G/DL (ref 1.9–3.5)
GLUCOSE SERPL-MCNC: 92 MG/DL (ref 65–99)
HCG SERPL QL: NEGATIVE
HCT VFR BLD AUTO: 31 % (ref 37–47)
HGB BLD-MCNC: 9.3 G/DL (ref 12–16)
IMM GRANULOCYTES # BLD AUTO: 0.04 K/UL (ref 0–0.11)
IMM GRANULOCYTES NFR BLD AUTO: 0.5 % (ref 0–0.9)
LACTATE BLD-SCNC: 2.4 MMOL/L (ref 0.5–2)
LYMPHOCYTES # BLD AUTO: 1.38 K/UL (ref 1–4.8)
LYMPHOCYTES NFR BLD: 17.1 % (ref 22–41)
MAGNESIUM SERPL-MCNC: 1.6 MG/DL (ref 1.5–2.5)
MCH RBC QN AUTO: 28.3 PG (ref 27–33)
MCHC RBC AUTO-ENTMCNC: 30 G/DL (ref 33.6–35)
MCV RBC AUTO: 94.2 FL (ref 81.4–97.8)
MONOCYTES # BLD AUTO: 0.33 K/UL (ref 0–0.85)
MONOCYTES NFR BLD AUTO: 4.1 % (ref 0–13.4)
NEUTROPHILS # BLD AUTO: 6.25 K/UL (ref 2–7.15)
NEUTROPHILS NFR BLD: 77.2 % (ref 44–72)
NRBC # BLD AUTO: 0 K/UL
NRBC BLD-RTO: 0 /100 WBC
PHOSPHATE SERPL-MCNC: 5.4 MG/DL (ref 2.5–4.5)
PLATELET # BLD AUTO: 130 K/UL (ref 164–446)
PMV BLD AUTO: 10.9 FL (ref 9–12.9)
POTASSIUM SERPL-SCNC: 4.8 MMOL/L (ref 3.6–5.5)
PROCALCITONIN SERPL-MCNC: 1.56 NG/ML
PROT SERPL-MCNC: 9 G/DL (ref 6–8.2)
RBC # BLD AUTO: 3.29 M/UL (ref 4.2–5.4)
RSV RNA SPEC QL NAA+PROBE: NEGATIVE
SARS-COV-2 RNA RESP QL NAA+PROBE: NOTDETECTED
SODIUM SERPL-SCNC: 129 MMOL/L (ref 135–145)
SPECIMEN SOURCE: NORMAL
TROPONIN T SERPL-MCNC: 937 NG/L (ref 6–19)
WBC # BLD AUTO: 8.1 K/UL (ref 4.8–10.8)

## 2020-12-01 PROCEDURE — 93005 ELECTROCARDIOGRAM TRACING: CPT | Performed by: EMERGENCY MEDICINE

## 2020-12-01 PROCEDURE — 85652 RBC SED RATE AUTOMATED: CPT

## 2020-12-01 PROCEDURE — 0241U HCHG SARS-COV-2 COVID-19 NFCT DS RESP RNA 4 TRGT MIC: CPT

## 2020-12-01 PROCEDURE — 84484 ASSAY OF TROPONIN QUANT: CPT

## 2020-12-01 PROCEDURE — 83605 ASSAY OF LACTIC ACID: CPT

## 2020-12-01 PROCEDURE — 80053 COMPREHEN METABOLIC PANEL: CPT

## 2020-12-01 PROCEDURE — 86140 C-REACTIVE PROTEIN: CPT

## 2020-12-01 PROCEDURE — 84145 PROCALCITONIN (PCT): CPT

## 2020-12-01 PROCEDURE — 84703 CHORIONIC GONADOTROPIN ASSAY: CPT

## 2020-12-01 PROCEDURE — 85730 THROMBOPLASTIN TIME PARTIAL: CPT

## 2020-12-01 PROCEDURE — 99285 EMERGENCY DEPT VISIT HI MDM: CPT

## 2020-12-01 PROCEDURE — 84100 ASSAY OF PHOSPHORUS: CPT

## 2020-12-01 PROCEDURE — 83735 ASSAY OF MAGNESIUM: CPT

## 2020-12-01 PROCEDURE — 86160 COMPLEMENT ANTIGEN: CPT | Mod: 91

## 2020-12-01 PROCEDURE — 71045 X-RAY EXAM CHEST 1 VIEW: CPT

## 2020-12-01 PROCEDURE — 85025 COMPLETE CBC W/AUTO DIFF WBC: CPT

## 2020-12-01 PROCEDURE — 85610 PROTHROMBIN TIME: CPT

## 2020-12-01 PROCEDURE — 83880 ASSAY OF NATRIURETIC PEPTIDE: CPT

## 2020-12-01 ASSESSMENT — FIBROSIS 4 INDEX: FIB4 SCORE: 1.52

## 2020-12-02 VITALS
SYSTOLIC BLOOD PRESSURE: 124 MMHG | BODY MASS INDEX: 21.31 KG/M2 | OXYGEN SATURATION: 96 % | HEART RATE: 94 BPM | RESPIRATION RATE: 12 BRPM | DIASTOLIC BLOOD PRESSURE: 94 MMHG | TEMPERATURE: 98.2 F | WEIGHT: 135.8 LBS | HEIGHT: 67 IN

## 2020-12-02 PROBLEM — D63.8 ANEMIA, CHRONIC DISEASE: Status: ACTIVE | Noted: 2020-01-26

## 2020-12-02 PROBLEM — J81.0 ACUTE PULMONARY EDEMA (HCC): Status: ACTIVE | Noted: 2020-12-02

## 2020-12-02 LAB
APTT PPP: 29.2 SEC (ref 24.7–36)
C3 SERPL-MCNC: 93.1 MG/DL (ref 87–200)
C4 SERPL-MCNC: 12.5 MG/DL (ref 19–52)
CRP SERPL HS-MCNC: 1.83 MG/DL (ref 0–0.75)
ERYTHROCYTE [SEDIMENTATION RATE] IN BLOOD BY WESTERGREN METHOD: 85 MM/HOUR (ref 0–20)
INR PPP: 1.12 (ref 0.87–1.13)
NT-PROBNP SERPL IA-MCNC: ABNORMAL PG/ML (ref 0–125)
PROTHROMBIN TIME: 14.1 SEC (ref 12–14.6)
TROPONIN T SERPL-MCNC: 785 NG/L (ref 6–19)

## 2020-12-02 PROCEDURE — 99236 HOSP IP/OBS SAME DATE HI 85: CPT | Performed by: HOSPITALIST

## 2020-12-02 PROCEDURE — 84484 ASSAY OF TROPONIN QUANT: CPT

## 2020-12-02 PROCEDURE — A9270 NON-COVERED ITEM OR SERVICE: HCPCS | Performed by: HOSPITALIST

## 2020-12-02 PROCEDURE — 700102 HCHG RX REV CODE 250 W/ 637 OVERRIDE(OP): Performed by: HOSPITALIST

## 2020-12-02 PROCEDURE — 96372 THER/PROPH/DIAG INJ SC/IM: CPT

## 2020-12-02 PROCEDURE — 87040 BLOOD CULTURE FOR BACTERIA: CPT

## 2020-12-02 PROCEDURE — 700111 HCHG RX REV CODE 636 W/ 250 OVERRIDE (IP): Performed by: INTERNAL MEDICINE

## 2020-12-02 PROCEDURE — 90935 HEMODIALYSIS ONE EVALUATION: CPT

## 2020-12-02 PROCEDURE — G0378 HOSPITAL OBSERVATION PER HR: HCPCS

## 2020-12-02 RX ORDER — AMOXICILLIN 250 MG
2 CAPSULE ORAL 2 TIMES DAILY
Status: DISCONTINUED | OUTPATIENT
Start: 2020-12-02 | End: 2020-12-02 | Stop reason: HOSPADM

## 2020-12-02 RX ORDER — PREDNISONE 5 MG/1
5 TABLET ORAL EVERY EVENING
Status: DISCONTINUED | OUTPATIENT
Start: 2020-12-02 | End: 2020-12-02 | Stop reason: HOSPADM

## 2020-12-02 RX ORDER — MYCOPHENOLATE MOFETIL 250 MG/1
250 CAPSULE ORAL EVERY EVENING
Status: DISCONTINUED | OUTPATIENT
Start: 2020-12-02 | End: 2020-12-02 | Stop reason: HOSPADM

## 2020-12-02 RX ORDER — ONDANSETRON 2 MG/ML
4 INJECTION INTRAMUSCULAR; INTRAVENOUS EVERY 4 HOURS PRN
Status: DISCONTINUED | OUTPATIENT
Start: 2020-12-02 | End: 2020-12-02 | Stop reason: HOSPADM

## 2020-12-02 RX ORDER — AMLODIPINE BESYLATE 5 MG/1
10 TABLET ORAL EVERY EVENING
Status: DISCONTINUED | OUTPATIENT
Start: 2020-12-02 | End: 2020-12-02 | Stop reason: HOSPADM

## 2020-12-02 RX ORDER — PREDNISONE 5 MG/1
5 TABLET ORAL EVERY EVENING
Status: ON HOLD | COMMUNITY
End: 2021-05-19

## 2020-12-02 RX ORDER — ONDANSETRON 4 MG/1
4 TABLET, ORALLY DISINTEGRATING ORAL EVERY 4 HOURS PRN
Status: DISCONTINUED | OUTPATIENT
Start: 2020-12-02 | End: 2020-12-02 | Stop reason: HOSPADM

## 2020-12-02 RX ORDER — BISACODYL 10 MG
10 SUPPOSITORY, RECTAL RECTAL
Status: DISCONTINUED | OUTPATIENT
Start: 2020-12-02 | End: 2020-12-02 | Stop reason: HOSPADM

## 2020-12-02 RX ORDER — ACETAMINOPHEN 325 MG/1
650 TABLET ORAL EVERY 6 HOURS PRN
Status: DISCONTINUED | OUTPATIENT
Start: 2020-12-02 | End: 2020-12-02 | Stop reason: HOSPADM

## 2020-12-02 RX ORDER — SUCRALFATE ORAL 1 G/10ML
1 SUSPENSION ORAL 3 TIMES DAILY
Status: DISCONTINUED | OUTPATIENT
Start: 2020-12-02 | End: 2020-12-02

## 2020-12-02 RX ORDER — PROCHLORPERAZINE EDISYLATE 5 MG/ML
5-10 INJECTION INTRAMUSCULAR; INTRAVENOUS EVERY 4 HOURS PRN
Status: DISCONTINUED | OUTPATIENT
Start: 2020-12-02 | End: 2020-12-02 | Stop reason: HOSPADM

## 2020-12-02 RX ORDER — MYCOPHENOLIC ACID 180 MG/1
180 TABLET, DELAYED RELEASE ORAL EVERY EVENING
Status: DISCONTINUED | OUTPATIENT
Start: 2020-12-02 | End: 2020-12-02

## 2020-12-02 RX ORDER — POLYETHYLENE GLYCOL 3350 17 G/17G
1 POWDER, FOR SOLUTION ORAL
Status: DISCONTINUED | OUTPATIENT
Start: 2020-12-02 | End: 2020-12-02 | Stop reason: HOSPADM

## 2020-12-02 RX ORDER — PROMETHAZINE HYDROCHLORIDE 25 MG/1
12.5-25 TABLET ORAL EVERY 4 HOURS PRN
Status: DISCONTINUED | OUTPATIENT
Start: 2020-12-02 | End: 2020-12-02 | Stop reason: HOSPADM

## 2020-12-02 RX ORDER — HEPARIN SODIUM 1000 [USP'U]/ML
1500 INJECTION, SOLUTION INTRAVENOUS; SUBCUTANEOUS ONCE
Status: COMPLETED | OUTPATIENT
Start: 2020-12-02 | End: 2020-12-02

## 2020-12-02 RX ORDER — PROMETHAZINE HYDROCHLORIDE 25 MG/1
12.5-25 SUPPOSITORY RECTAL EVERY 4 HOURS PRN
Status: DISCONTINUED | OUTPATIENT
Start: 2020-12-02 | End: 2020-12-02 | Stop reason: HOSPADM

## 2020-12-02 RX ORDER — HYDROXYCHLOROQUINE SULFATE 200 MG/1
200 TABLET, FILM COATED ORAL EVERY EVENING
Status: DISCONTINUED | OUTPATIENT
Start: 2020-12-02 | End: 2020-12-02 | Stop reason: HOSPADM

## 2020-12-02 RX ORDER — ATENOLOL 25 MG/1
25 TABLET ORAL EVERY EVENING
Status: DISCONTINUED | OUTPATIENT
Start: 2020-12-02 | End: 2020-12-02 | Stop reason: HOSPADM

## 2020-12-02 RX ADMIN — EPOETIN ALFA-EPBX 3000 UNITS: 3000 INJECTION, SOLUTION INTRAVENOUS; SUBCUTANEOUS at 17:38

## 2020-12-02 RX ADMIN — HEPARIN SODIUM 1500 UNITS: 1000 INJECTION, SOLUTION INTRAVENOUS; SUBCUTANEOUS at 17:38

## 2020-12-02 RX ADMIN — SUCRALFATE 1 G: 1 SUSPENSION ORAL at 08:32

## 2020-12-02 RX ADMIN — ACETAMINOPHEN 650 MG: 325 TABLET, FILM COATED ORAL at 08:32

## 2020-12-02 ASSESSMENT — ENCOUNTER SYMPTOMS
SORE THROAT: 0
DOUBLE VISION: 0
MYALGIAS: 0
PALPITATIONS: 0
NECK PAIN: 0
NAUSEA: 0
COUGH: 0
DEPRESSION: 0
HEADACHES: 0
WEAKNESS: 0
SHORTNESS OF BREATH: 1
BLURRED VISION: 0
DIZZINESS: 0
INSOMNIA: 0
VOMITING: 0
FEVER: 0
BRUISES/BLEEDS EASILY: 0

## 2020-12-02 NOTE — PROGRESS NOTES
Patient was seen and examined by me this morning.  Nephrology consulted for dialysis.  Check ESR, CRP and complement levels for lupus flare.  I will also discontinue her Carafate since it can cause limiting toxicity in dialysis patients and she is past 3 weeks of a GI bleed.

## 2020-12-02 NOTE — ASSESSMENT & PLAN NOTE
-To arrange hemodialysis session in the morning.  She is a patient of .  Hemodialysis Monday, Wednesdays and Fridays.  Please call nephrology consult in the morning to arrange hemodialysis session.

## 2020-12-02 NOTE — ASSESSMENT & PLAN NOTE
-Patient received blood transfusion for hemoglobin of 5.4, 2 days ago.  On admission, hemoglobin is 9.3.  Monitor morning labs.

## 2020-12-02 NOTE — ED NOTES
Report received from Te MARTINEZ, BC x2 was not drawn as ordered at 1495, order clarified with DR Draper to yes draw BCx2.  Lab called for BC x 2 draw.

## 2020-12-02 NOTE — DISCHARGE SUMMARY
Med rec updated and complete  Allergies reviewed  Pt reports that she had not had to take her LOSARTAN 100MG in about 4 months  Pt reports no antibiotics in the last 2 weeks

## 2020-12-02 NOTE — H&P
Hospital Medicine History & Physical Note    Date of Service  12/2/2020    Primary Care Physician  Ella Matthew M.D.    Consultants  Please call Nephrology: Dr. Trent in am to arrange non-emergent HD    Code Status  Full Code    Chief Complaint  Chief Complaint   Patient presents with   • Shortness of Breath       History of Presenting Illness  25 y.o. female, history of end-stage renal disease on hemodialysis (M-W-F, Dr. Trent), secondary to lupus diagnosed 10 years ago and lupus related renal failure, currently immune suppressed on Plaquenil, prednisone and mycophenolate, who received a blood transfusion outpatient at [facility] 2 days ago for having hemoglobin of 5.4, who presented to the emergency department on 12/1/2020 with complains of increasing exertional dyspnea since yesterday.  Patient denies having fever, cough, congestion, sore throat, changes on taste or smell.  No recent travel, no sick contacts.  She also denies having dysuria.  Patient did not missed any dialysis session recently.    ER COURSE:  -She is afebrile in the ED, tachycardic with heart rate of 98, respiratory rate 16 and blood pressure 187/133.  -Laboratory showing elevated lactic acid.  Elevated proBNP over 35,000 and initial troponin of 937 with a second troponin of 785.  Chest x-ray showing pulmonary edema and infiltrates.  Procalcitonin also elevated 1.56.  -Rapid Covid test was done in the emergency department and negative.  -Patient is mostly anuric and was unable to provide urine.    Review of Systems  Review of Systems   Constitutional: Positive for malaise/fatigue. Negative for fever.   HENT: Negative for congestion and sore throat.    Eyes: Negative for blurred vision and double vision.   Respiratory: Positive for shortness of breath. Negative for cough.    Cardiovascular: Negative for chest pain and palpitations.   Gastrointestinal: Negative for nausea and vomiting.   Genitourinary: Negative for dysuria and urgency.    Musculoskeletal: Negative for myalgias and neck pain.   Skin: Negative for itching and rash.   Neurological: Negative for dizziness, weakness and headaches.   Endo/Heme/Allergies: Does not bruise/bleed easily.   Psychiatric/Behavioral: Negative for depression. The patient does not have insomnia.        Past Medical History   has a past medical history of Anemia (01/17/2018), AVF (arteriovenous fistula) ([facility]), Dialysis patient ([facility]), ESRD (end stage renal disease) on dialysis ([facility]) (01/17/2018), Heart burn, Hypertension (01/17/2018), Indigestion, Lupus ([facility]), Migraines (01/17/2018), and Seizure ([facility]) (2013).    Surgical History   has a past surgical history that includes ronak by laparoscopy (4/5/2010); av fistula creation (Right); angioplasty (01/17/2018); other; other; gastroscopy-endo (12/9/2019); gastroscopy (N/A, 5/30/2020); gastroscopy-endo (9/18/2020); pr upper gi endoscopy,ctrl bleed (11/12/2020); pr upper gi endoscopy,biopsy (11/12/2020); and gastroscopy-endo (11/12/2020).     Family History  family history includes Diabetes in her paternal grandmother.     Social History   reports that she has never smoked. She has never used smokeless tobacco. She reports that she does not drink alcohol or use drugs.    Allergies  Allergies   Allergen Reactions   • Clindamycin Rash     Hive   • Keflex Rash     Hives   • Methylprednisolone      Anxious;   • Metoprolol Nausea       Medications  Prior to Admission Medications   Prescriptions Last Dose Informant Patient Reported? Taking?   acetaminophen (TYLENOL) 500 MG Tab 11/30/2020 at 2100 Patient Yes No   Sig: Take 500 mg by mouth every 6 hours as needed for Moderate Pain.   amLODIPine (NORVASC) 10 MG Tab 12/1/2020 at 2000 Patient Yes No   Sig: Take 10 mg by mouth every evening.   atenolol (TENORMIN) 25 MG Tab 12/1/2020 at 2000 Patient Yes No   Sig: Take 25 mg by mouth every evening.   ferrous gluconate (FERGON) 324 (38 Fe) MG Tab 12/1/2020 at 2000  No No   Sig: Take 1  Tab by mouth 3 times a day, with meals for 30 days.   hydroxychloroquine (PLAQUENIL) 200 MG Tab 12/1/2020 at 2000 Patient Yes No   Sig: Take 200 mg by mouth every evening.   mycophenolate (MYFORTIC) 180 MG EC tablet 12/1/2020 at 2000 Patient No No   Sig: Take 1 Tab by mouth every evening.   omeprazole (PRILOSEC) 20 MG delayed-release capsule 12/1/2020 at 2000  No No   Sig: Take 1 Cap by mouth 2 times a day.   ondansetron (ZOFRAN ODT) 4 MG TABLET DISPERSIBLE   No No   Sig: Take 1 Tab by mouth every four hours as needed for Nausea.   senna-docusate (PERICOLACE OR SENOKOT S) 8.6-50 MG Tab 12/1/2020 at 2000  No No   Sig: Take 2 Tabs by mouth every day.   sucralfate (CARAFATE) 1 GM/10ML Suspension 12/1/2020 at 2000  No No   Sig: Take 10 mL by mouth 3 times a day for 30 days.      Facility-Administered Medications: None       Physical Exam  Temp:  [36.1 °C (97 °F)] 36.1 °C (97 °F)  Pulse:  [98] 98  Resp:  [16] 16  BP: (187)/(133) 187/133  SpO2:  [88 %] 88 %    Physical Exam  Constitutional:       Appearance: Normal appearance.   HENT:      Head: Normocephalic and atraumatic.      Mouth/Throat:      Mouth: Mucous membranes are moist.      Pharynx: Oropharynx is clear.   Eyes:      Extraocular Movements: Extraocular movements intact.      Pupils: Pupils are equal, round, and reactive to light.   Neck:      Musculoskeletal: Normal range of motion and neck supple.   Cardiovascular:      Rate and Rhythm: Normal rate and regular rhythm.      Heart sounds: Normal heart sounds.   Pulmonary:      Effort: Pulmonary effort is normal.      Breath sounds: Normal breath sounds.   Abdominal:      General: Abdomen is flat. Bowel sounds are normal.      Palpations: Abdomen is soft.   Skin:     General: Skin is warm and dry.      Comments: Multiple erythematous lesions throughout her body, per patient, chronic rash unchanged.   Neurological:      General: No focal deficit present.      Mental Status: She is alert and oriented to person,  place, and time.   Psychiatric:         Mood and Affect: Mood normal.         Behavior: Behavior normal.         Laboratory:  Recent Labs     12/01/20 2307   WBC 8.1   RBC 3.29*   HEMOGLOBIN 9.3*   HEMATOCRIT 31.0*   MCV 94.2   MCH 28.3   MCHC 30.0*   RDW 66.7*   PLATELETCT 130*   MPV 10.9     Recent Labs     12/01/20 2307   SODIUM 129*   POTASSIUM 4.8   CHLORIDE 87*   CO2 25   GLUCOSE 92   BUN 56*   CREATININE 6.74*   CALCIUM 9.4     Recent Labs     12/01/20 2307   ALTSGPT 10   ASTSGOT 30   ALKPHOSPHAT 82   TBILIRUBIN 0.6   GLUCOSE 92     Recent Labs     12/01/20 2307   APTT 29.2   INR 1.12     Recent Labs     12/01/20 2307   NTPROBNP >50673*         Recent Labs     12/01/20 2307 12/02/20  0243   TROPONINT 937* 785*       Imaging:  DX-CHEST-PORTABLE (1 VIEW)   Final Result         1.  Pulmonary edema and/or infiltrates.        EKG: Sinus tachycardia at 96 bpm.  Normal axis and no acute ST elevation.  LVH (S on V2 and R V5 > 35 mm).  This is my interpretation.    Assessment/Plan:  I anticipate this patient is appropriate for observation status at this time.    * Acute pulmonary edema (HCC)  Assessment & Plan  -Patient will be admitted to the hospital on telemetry unit.  -Underlying history of end-stage renal disease and recent blood transfusion 2 days ago   -Mostly anuric therefore hold off on Lasix right now we will arrange hemodialysis in the morning.  She is a patient with Dr. Trent and states not missing any recent hemodialysis session.  Potassium is 4.8 on admission.  -Daily weight  -BNP on admission: Over 35,000.  She also has chronically elevated troponin level and repeat troponin is lower than first.  -Echocardiogram: Most recent echocardiogram on file is from 7/2/2020 and her EF was 60%  -Serial cardiac enzymes  -Also noticed elevated procalcitonin level on admission however no clear underlying source of infection.  Very low threshold to start antibiotic therapy.  Rapid Covid test was done in the ER  and negative    Lupus (HCC)- (present on admission)  Assessment & Plan  -Immune suppressed on prednisone, Plaquenil and mycophenolate that I will continue.    ESRD (end stage renal disease) on dialysis (HCC)- (present on admission)  Assessment & Plan  -To arrange hemodialysis session in the morning.  She is a patient of .  Hemodialysis Monday, Wednesdays and Fridays.  Please call nephrology consult in the morning to arrange hemodialysis session.     Anemia due to chronic kidney disease- (present on admission)  Assessment & Plan  -Patient received blood transfusion for hemoglobin of 5.4, 2 days ago.  On admission, hemoglobin is 9.3.  Monitor morning labs.    Hyponatremia- (present on admission)  Assessment & Plan  -With underlying pulmonary edema.  Hemodialysis in the morning and closely follow-up morning labs.    Gastroesophageal reflux disease without esophagitis- (present on admission)  Assessment & Plan  -Continue sucralfate    Thrombocytopenia (HCC)- (present on admission)  Assessment & Plan  -On admission 130, monitor morning labs.    Hypertension- (present on admission)  Assessment & Plan  -On amlodipine and atenolol that I will continue.      DVT Ppx: SCD's

## 2020-12-02 NOTE — DISCHARGE PLANNING
Outpatient Dialysis Note    Confirmed patient is active at:    [address] [address]    Schedule: Monday, Wednesday, Friday   Time: 06:00am    Patient is seen by Dr. Trent in HD clinic.    Spoke with Leila at facility who confirmed.    Forwarded records for review.    Mary Han- Dialysis Coordinator  [facility] # [phone]

## 2020-12-02 NOTE — ASSESSMENT & PLAN NOTE
-Patient will be admitted to the hospital on telemetry unit.  -Underlying history of end-stage renal disease and recent blood transfusion 2 days ago   -Mostly anuric therefore hold off on Lasix right now we will arrange hemodialysis in the morning.  She is a patient with Dr. Trent and states not missing any recent hemodialysis session.  Potassium is 4.8 on admission.  -Daily weight  -BNP on admission: Over 35,000.  She also has chronically elevated troponin level and repeat troponin is lower than first.  -Echocardiogram: Most recent echocardiogram on file is from 7/2/2020 and her EF was 60%  -Serial cardiac enzymes  -Also noticed elevated procalcitonin level on admission however no clear underlying source of infection.  Very low threshold to start antibiotic therapy.  Rapid Covid test was done in the ER and negative

## 2020-12-02 NOTE — ED PROVIDER NOTES
"ED Provider Note    CHIEF COMPLAINT  Chief Complaint   Patient presents with   • Shortness of Breath        HPI    Primary care provider: Ella Matthew M.D.   History obtained from: Patient  History limited by: None     Lily Nicole is a 25 y.o. female who presents to the ED complaining of shortness of breath that started \"today.\"  Patient with history of renal failure due to lupus and is on hemodialysis on Mondays, Wednesdays and Fridays and has not missed any sessions.  She reports orthopnea and dyspnea with exertion.  She denies any pain except for chest tightness.  She denies fever/chills/congestion/sore throat/cough/change in taste or smell/nausea/vomiting/diarrhea/change with urination/rash/swelling.  She denies possibility of pregnancy and reports that she has not been sexually active recently.  She denies recent travels or known ill contacts.  She reports similar symptoms a few months ago due to \"too much fluid.\"    REVIEW OF SYSTEMS  Please see HPI for pertinent positives/negatives.  All other systems reviewed and are negative.     PAST MEDICAL HISTORY  Past Medical History:   Diagnosis Date   • Anemia 01/17/2018   • ESRD (end stage renal disease) on dialysis ([facility]) 01/17/2018    Twan Fu   • Hypertension 01/17/2018    \"Controlled with medication\"   • Migraines 01/17/2018   • Seizure ([facility]) 2013    from high blood pressure, reports 1 time event   • AVF (arteriovenous fistula) ([facility])     Right Arm   • Dialysis patient ([facility])      dialysis, M,W,F [facility]   • Heart burn    • Indigestion    • Lupus (HCC)         SURGICAL HISTORY  Past Surgical History:   Procedure Laterality Date   • PB UPPER GI ENDOSCOPY,CTRL BLEED  11/12/2020    Procedure: GASTROSCOPY, WITH ARGON PLASMA COAGULATION;  Surgeon: Gadiel Whitney M.D.;  Location: SURGERY [facility];  Service: Gastroenterology   • PB UPPER GI ENDOSCOPY,BIOPSY  11/12/2020    Procedure: GASTROSCOPY, WITH BIOPSY;  Surgeon: Gadiel Whitney M.D.;  Location: " "SURGERY [facility];  Service: Gastroenterology   • GASTROSCOPY-ENDO  11/12/2020    Procedure: GASTROSCOPY;  Surgeon: Gadiel Whitney M.D.;  Location: [facility];  Service: Gastroenterology   • GASTROSCOPY-ENDO  9/18/2020    Procedure: GASTROSCOPY;  Surgeon: Gadiel Whitney M.D.;  Location: SURGERY [facility];  Service: Gastroenterology   • GASTROSCOPY N/A 5/30/2020    Procedure: GASTROSCOPY;  Surgeon: Waylon Mcqueen M.D.;  Location: [facility];  Service: Gastroenterology   • GASTROSCOPY-ENDO  12/9/2019    Procedure: GASTROSCOPY;  Surgeon: Aaron Kam M.D.;  Location: [facility];  Service: Gastroenterology   • ANGIOPLASTY  01/17/2018    \"Right Arm AV-Fistulagram & Angioplastyx3\"   • ULI BY LAPAROSCOPY  4/5/2010    Performed by SYED MARTELL at SURGERY [facility]   • AV FISTULA CREATION Right    • OTHER      renal biopsy x 3   • OTHER      bone marrow biopsy        SOCIAL HISTORY  Social History     Tobacco Use   • Smoking status: Never Smoker   • Smokeless tobacco: Never Used   Substance and Sexual Activity   • Alcohol use: No   • Drug use: No   • Sexual activity: Not on file        FAMILY HISTORY  Family History   Problem Relation Age of Onset   • Diabetes Paternal Grandmother         CURRENT MEDICATIONS  Home Medications     Reviewed by Leonie Saldivar R.N. (Registered Nurse) on 12/01/20 at 2305  Med List Status: Complete   Medication Last Dose Status   acetaminophen (TYLENOL) 500 MG Tab 11/30/2020 Active   amLODIPine (NORVASC) 10 MG Tab 12/1/2020 Active   atenolol (TENORMIN) 25 MG Tab 12/1/2020 Active   ferrous gluconate (FERGON) 324 (38 Fe) MG Tab 12/1/2020 Active   hydroxychloroquine (PLAQUENIL) 200 MG Tab 12/1/2020 Active   mycophenolate (MYFORTIC) 180 MG EC tablet 12/1/2020 Active   omeprazole (PRILOSEC) 20 MG delayed-release capsule 12/1/2020 Active   ondansetron (ZOFRAN ODT) 4 MG TABLET DISPERSIBLE  Active   senna-docusate (PERICOLACE OR " "SENOKOT S) 8.6-50 MG Tab 12/1/2020 Active   sucralfate (CARAFATE) 1 GM/10ML Suspension 12/1/2020 Active                 ALLERGIES  Allergies   Allergen Reactions   • Clindamycin Rash     Hive   • Keflex Rash     Hives   • Methylprednisolone      Anxious;   • Metoprolol Nausea        PHYSICAL EXAM  VITAL SIGNS: BP (!) 187/133   Pulse 98   Temp 36.1 °C (97 °F) (Oral)   Resp 16   Ht 1.702 m (5' 7\")   Wt 61.6 kg (135 lb 12.9 oz)   SpO2 88%   BMI 21.27 kg/m²  @MAGALI[[id number]::@     Pulse ox interpretation: 88% on room air I interpret this pulse ox as abnormal    Cardiac monitor interpretation: Sinus rhythm with heart rate in the 90s as interpreted by me.  The patient presented with dyspnea and cardiac monitor was ordered to monitor for dysrhythmia.    Constitutional: Well developed, well nourished, alert in no apparent distress, nontoxic appearance    HENT: No external signs of trauma, normocephalic, mask on due to COVID-19 pandemic  Eyes: PERRL, conjunctiva without erythema, no discharge, no icterus    Neck: Soft and supple, trachea midline, no stridor, no tenderness, no LAD, no JVD, good ROM    Cardiovascular: Regular rate and rhythm, no murmurs/rubs/gallops, strong distal pulses and good perfusion    Thorax & Lungs: No respiratory distress, coarse breath sounds bilateral bases  Abdomen: Soft, nontender, nondistended, no guarding, no rebound, normal BS    Back: No CVAT    Extremities: No cyanosis, no edema, no gross deformity, good ROM, no tenderness, intact distal pulses with brisk cap refill    Skin: Warm, dry, no pallor/cyanosis, no rash noted except chronic appearing skin changes  Lymphatic: No lymphadenopathy noted    Neuro: A/O times 3, no focal deficits noted, ambulating without difficulty  Psychiatric: Cooperative, normal mood and affect, normal judgement, appropriate for clinical situation        DIAGNOSTIC STUDIES / PROCEDURES    EKG  12 Lead EKG obtained at 2307 and interpreted by me:   Rate: 96   Rhythm: " Sinus rhythm   Ectopy: None  Intervals: Normal   Axis: Normal   QRS: Normal   ST segments: Minimal ST depression lateral leads  T Waves: Normal    Clinical Impression: Sinus rhythm with nonspecific ST changes  Compared to August 2, 2020 and July 1, 2020 without significant change      LABS  All labs reviewed by me.     Results for orders placed or performed during the hospital encounter of 12/01/20   CBC WITH DIFFERENTIAL   Result Value Ref Range    WBC 8.1 4.8 - 10.8 K/uL    RBC 3.29 (L) 4.20 - 5.40 M/uL    Hemoglobin 9.3 (L) 12.0 - 16.0 g/dL    Hematocrit 31.0 (L) 37.0 - 47.0 %    MCV 94.2 81.4 - 97.8 fL    MCH 28.3 27.0 - 33.0 pg    MCHC 30.0 (L) 33.6 - 35.0 g/dL    RDW 66.7 (H) 35.9 - 50.0 fL    Platelet Count 130 (L) 164 - 446 K/uL    MPV 10.9 9.0 - 12.9 fL    Neutrophils-Polys 77.20 (H) 44.00 - 72.00 %    Lymphocytes 17.10 (L) 22.00 - 41.00 %    Monocytes 4.10 0.00 - 13.40 %    Eosinophils 0.20 0.00 - 6.90 %    Basophils 0.90 0.00 - 1.80 %    Immature Granulocytes 0.50 0.00 - 0.90 %    Nucleated RBC 0.00 /100 WBC    Neutrophils (Absolute) 6.25 2.00 - 7.15 K/uL    Lymphs (Absolute) 1.38 1.00 - 4.80 K/uL    Monos (Absolute) 0.33 0.00 - 0.85 K/uL    Eos (Absolute) 0.02 0.00 - 0.51 K/uL    Baso (Absolute) 0.07 0.00 - 0.12 K/uL    Immature Granulocytes (abs) 0.04 0.00 - 0.11 K/uL    NRBC (Absolute) 0.00 K/uL   COMP METABOLIC PANEL   Result Value Ref Range    Sodium 129 (L) 135 - 145 mmol/L    Potassium 4.8 3.6 - 5.5 mmol/L    Chloride 87 (L) 96 - 112 mmol/L    Co2 25 20 - 33 mmol/L    Anion Gap 17.0 (H) 7.0 - 16.0    Glucose 92 65 - 99 mg/dL    Bun 56 (H) 8 - 22 mg/dL    Creatinine 6.74 (HH) 0.50 - 1.40 mg/dL    Calcium 9.4 8.4 - 10.2 mg/dL    AST(SGOT) 30 12 - 45 U/L    ALT(SGPT) 10 2 - 50 U/L    Alkaline Phosphatase 82 30 - 99 U/L    Total Bilirubin 0.6 0.1 - 1.5 mg/dL    Albumin 3.5 3.2 - 4.9 g/dL    Total Protein 9.0 (H) 6.0 - 8.2 g/dL    Globulin 5.5 (H) 1.9 - 3.5 g/dL    A-G Ratio 0.6 g/dL   TROPONIN   Result  Value Ref Range    Troponin T 937 (H) 6 - 19 ng/L   MAGNESIUM   Result Value Ref Range    Magnesium 1.6 1.5 - 2.5 mg/dL   PHOSPHORUS   Result Value Ref Range    Phosphorus 5.4 (H) 2.5 - 4.5 mg/dL   BETA-HCG QUALITATIVE SERUM   Result Value Ref Range    Beta-Hcg Qualitative Serum Negative Negative   COVID/SARS CoV-2 PCR    Specimen: Nasopharyngeal; Respirate   Result Value Ref Range    COVID Order Status Received    PROCALCITONIN   Result Value Ref Range    Procalcitonin 1.56 (H) <0.25 ng/mL   LACTIC ACID   Result Value Ref Range    Lactic Acid 2.4 (H) 0.5 - 2.0 mmol/L   CoV-2, Flu A/B, And RSV by PCR   Result Value Ref Range    Influenza virus A RNA Negative Negative    Influenza virus B, PCR Negative Negative    RSV, PCR Negative Negative    SARS-CoV-2 by PCR NotDetected     SARS-CoV-2 Source NP Swab    ESTIMATED GFR   Result Value Ref Range    GFR If  9 (A) >60 mL/min/1.73 m 2    GFR If Non  8 (A) >60 mL/min/1.73 m 2   APTT   Result Value Ref Range    APTT 29.2 24.7 - 36.0 sec   PROTHROMBIN TIME (INR)   Result Value Ref Range    PT 14.1 12.0 - 14.6 sec    INR 1.12 0.87 - 1.13   proBrain Natriuretic Peptide, NT   Result Value Ref Range    NT-proBNP >05739 (H) 0 - 125 pg/mL   TROPONIN   Result Value Ref Range    Troponin T 785 (H) 6 - 19 ng/L   EKG (NOW)   Result Value Ref Range    Report       [facility] Emergency Dept.    Test Date:  2020  Pt Name:    CASE WOODSON            Department: [facility]  MRN:        [id number]                      [facility]  Gender:     Female                       Technician: DEEDEE  :        [id number]                   Requested By:NATALIE MA  Order #:    [id number]                    Yefri MD:    Measurements  Intervals                                Axis  Rate:       96                           P:          64  NY:         169                          QRS:        11  QRSD:       95                           T:           64  QT:         357  QTc:        452    Interpretive Statements  Sinus rhythm  Probable left atrial enlargement  Left ventricular hypertrophy  Baseline wander in lead(s) III,aVL  Compared to ECG 08/02/2020 18:52:39  Left ventricular hypertrophy now present          RADIOLOGY  The radiologist's interpretation of all radiological studies have been reviewed by me.     DX-CHEST-PORTABLE (1 VIEW)   Final Result         1.  Pulmonary edema and/or infiltrates.             COURSE & MEDICAL DECISION MAKING  Nursing notes, VS, PMSFHx reviewed in chart.     Review of past medical records shows the patient had outpatient infusion therapy on November 28, 2020 due to anemia from chronic kidney disease.  Patient was last admitted to this hospital November 11, 2020 for abdominal pain with hematemesis and discharged on November 12, 2020.      Differential diagnoses considered include but are not limited to: AMI, pericardial effusion/tamponade, pericarditis, CHF/pulm edema, PE, pleural effusion, respiratory infection, respiratory failure, sepsis, metabolic acidosis       History and physical exam as above.  EKG without significant change compared to prior.  Chest x-ray with findings as above.  Suspect fluid overload from her recent transfusion for anemia.  Her hemoglobin has improved to 9.3 from 5.4 prior to the transfusion.  Thrombocytopenia appears stable compared to her prior results.  Hyponatremia appears to be chronic compared to her prior results.  Patient also with elevated troponin and BNP and likely chronic as well due to her chronic kidney disease.  Patient with slightly elevated lactic acid and procalcitonin but no clear source for infectious etiology at this time.  I discussed the findings with the patient and she is agreeable to admission given her results and risk factors.  Discussed with Dr. Madison, hospitalist, who graciously agreed to admit patient for further care.      CRITICAL CARE NOTE:  Total critical  care time spent on this patient was 30 minutes exclusive of separately billable procedures.  This may include direct bedside patient care, speaking with family members, review of past medical records, reviewing the results of laboratory/diagnostic studies, consulting with other physicians, as well as evaluating the response to the therapy instituted.        FINAL IMPRESSION  1. Orthopnea Acute   2. Dyspnea on exertion Acute   3. Acute pulmonary edema (HCC) Acute   4. Anemia, unspecified type Chronic   5. Thrombocytopenia (HCC) Chronic   6. Hyponatremia Chronic   7. Elevated troponin Chronic   8. Elevated lactic acid level Active   9. Elevated procalcitonin Active   10. Chronic kidney disease, unspecified CKD stage Active   11. Hypoxia Active          DISPOSITION  Patient will be admitted by hospitalist for further care      Electronically signed by: Bryce Draper D.O., 12/1/2020 10:49 PM      Portions of this record were made with voice recognition software.  Despite my review, spelling/grammar/context errors may still remain.  Interpretation of this chart should be taken in this context.

## 2020-12-03 RX ORDER — ACETAMINOPHEN 325 MG/1
650 TABLET ORAL PRN
Status: CANCELLED | OUTPATIENT
Start: 2020-12-17

## 2020-12-03 RX ORDER — DIPHENHYDRAMINE HCL 25 MG
25 TABLET ORAL ONCE
Status: CANCELLED | OUTPATIENT
Start: 2020-12-17 | End: 2020-12-17

## 2020-12-03 RX ORDER — SODIUM CHLORIDE 9 MG/ML
500 INJECTION, SOLUTION INTRAVENOUS ONCE
Status: CANCELLED | OUTPATIENT
Start: 2020-12-17 | End: 2020-12-17

## 2020-12-03 RX ORDER — LIDOCAINE HYDROCHLORIDE 10 MG/ML
20 INJECTION, SOLUTION INFILTRATION; PERINEURAL
Status: CANCELLED | OUTPATIENT
Start: 2020-12-17

## 2020-12-03 RX ORDER — HEPARIN SODIUM (PORCINE) LOCK FLUSH IV SOLN 100 UNIT/ML 100 UNIT/ML
500 SOLUTION INTRAVENOUS PRN
Status: CANCELLED | OUTPATIENT
Start: 2020-12-17

## 2020-12-03 RX ORDER — DIPHENHYDRAMINE HYDROCHLORIDE 50 MG/ML
25 INJECTION INTRAMUSCULAR; INTRAVENOUS PRN
Status: CANCELLED | OUTPATIENT
Start: 2020-12-17

## 2020-12-03 RX ORDER — SODIUM CHLORIDE 9 MG/ML
10 INJECTION, SOLUTION INTRAMUSCULAR; INTRAVENOUS; SUBCUTANEOUS PRN
Status: CANCELLED | OUTPATIENT
Start: 2020-12-17

## 2020-12-03 RX ORDER — ACETAMINOPHEN 325 MG/1
650 TABLET ORAL ONCE
Status: CANCELLED | OUTPATIENT
Start: 2020-12-17 | End: 2020-12-17

## 2020-12-03 NOTE — DISCHARGE SUMMARY
"Discharge Summary    CHIEF COMPLAINT ON ADMISSION  Chief Complaint   Patient presents with   • Shortness of Breath       Reason for Admission  Shortness of Breath     Admission Date  12/1/2020    CODE STATUS  Prior    HPI & HOSPITAL COURSE  This is a 25 y.o. female here with past medical history of end-stage renal disease on hemodialysis Monday Wednesday Friday, lupus who comes into the emergency room complaints of shortness of breath for the past 2 days.  She received 2 units of blood transfusion after having hemoglobin of 5.4.  Patient states that she also has a less fluid removed through dialysis.  She denies any fever, cough, rash chest pain, nausea, vomiting.  In the ED she was found to be tachycardic, hypertensive.  BNP was elevated and chest x-ray found acute pulmonary edema.  Covid PCR was negative.  Nephrology was consulted for hemodialysis which she completed and respiratory symptoms had resolved.  Patient ESR, CRP were elevated.  C4 levels were low.  Patient states that she has a rheumatology appointment in 3 days.  She should follow-up with her rheumatology in regards to possible lupus flare.      Therefore, she is discharged in good and stable condition to home with close outpatient follow-up.    The patient recovered much more quickly than anticipated on admission.    Discharge Date  12/2/2020    FOLLOW UP ITEMS POST DISCHARGE  12/3/2020    DISCHARGE DIAGNOSES  Principal Problem:    Acute pulmonary edema (HCC) POA: Unknown  Active Problems:    ESRD (end stage renal disease) on dialysis (HCC) POA: Yes    Lupus (HCC) POA: Yes    Hyponatremia POA: Yes    Anemia due to chronic kidney disease POA: Yes    Hypertension POA: Yes      Overview: \"Controlled with medication\"    Thrombocytopenia (HCC) POA: Yes    Gastroesophageal reflux disease without esophagitis POA: Yes  Resolved Problems:    * No resolved hospital problems. *      FOLLOW UP  No future appointments.  Ella Matthew M.D.  [address] [facility] [address]  [place] [address]  [phone]    In 1 week        MEDICATIONS ON DISCHARGE     Medication List      CHANGE how you take these medications      Instructions   senna-docusate 8.6-50 MG Tabs  What changed: when to take this  Commonly known as: PERICOLACE or SENOKOT S   Take 2 Tabs by mouth every day.  Dose: 2 Tab        CONTINUE taking these medications      Instructions   acetaminophen 500 MG Tabs  Commonly known as: TYLENOL   Take 500 mg by mouth every 6 hours as needed for Moderate Pain.  Dose: 500 mg     amLODIPine 10 MG Tabs  Commonly known as: NORVASC   Take 10 mg by mouth every evening.  Dose: 10 mg     atenolol 25 MG Tabs  Commonly known as: TENORMIN   Take 25 mg by mouth every evening.  Dose: 25 mg     ferrous gluconate 324 (38 Fe) MG Tabs  Commonly known as: FERGON   Take 1 Tab by mouth 3 times a day, with meals for 30 days.  Dose: 324 mg     hydroxychloroquine 200 MG Tabs  Commonly known as: Plaquenil   Take 200 mg by mouth every evening.  Dose: 200 mg     mycophenolate 180 MG EC tablet  Commonly known as: Myfortic   Take 1 Tab by mouth every evening.  Dose: 180 mg     omeprazole 20 MG delayed-release capsule  Commonly known as: PRILOSEC   Take 1 Cap by mouth 2 times a day.  Dose: 20 mg     ondansetron 4 MG Tbdp  Commonly known as: ZOFRAN ODT   Take 1 Tab by mouth every four hours as needed for Nausea.  Dose: 4 mg     predniSONE 5 MG Tabs  Commonly known as: DELTASONE   Take 5 mg by mouth every evening.  Dose: 5 mg        STOP taking these medications    sucralfate 1 GM/10ML Susp  Commonly known as: CARAFATE            Allergies  Allergies   Allergen Reactions   • Clindamycin Rash     Hive   • Keflex Rash     Hives   • Methylprednisolone      Anxious;   • Metoprolol Nausea       DIET  No orders of the defined types were placed in this encounter.      ACTIVITY  As tolerated.  Weight bearing as tolerated    CONSULTATIONS  Nephrology    PROCEDURES  None    LABORATORY  Lab Results   Component Value Date     SODIUM 129 (L) 12/01/2020    POTASSIUM 4.8 12/01/2020    CHLORIDE 87 (L) 12/01/2020    CO2 25 12/01/2020    GLUCOSE 92 12/01/2020    BUN 56 (H) 12/01/2020    CREATININE 6.74 (HH) 12/01/2020        Lab Results   Component Value Date    WBC 8.1 12/01/2020    HEMOGLOBIN 9.3 (L) 12/01/2020    HEMATOCRIT 31.0 (L) 12/01/2020    PLATELETCT 130 (L) 12/01/2020        Total time of the discharge process exceeds 50 minutes.

## 2020-12-03 NOTE — PROGRESS NOTES
HD note:  Pt tolerated tx without issue.  Net fluid removed was 3000mL.  Nephrologist visited pt during tx. AVF achieved hemostasis within 4 minutes of pulling each HD needle

## 2020-12-03 NOTE — DISCHARGE INSTRUCTIONS
Discharge Instructions    Discharged to home by car with relative. Discharged via wheelchair, hospital escort: Yes.  Special equipment needed: Not Applicable    Be sure to schedule a follow-up appointment with your primary care doctor or any specialists as instructed.     Discharge Plan:   Influenza Vaccine Indication: Not indicated: Previously immunized this influenza season and > 8 years of age    I understand that a diet low in cholesterol, fat, and sodium is recommended for good health. Unless I have been given specific instructions below for another diet, I accept this instruction as my diet prescription.   Other diet: Renal diet    Special Instructions: None    · Is patient discharged on Warfarin / Coumadin?   No     Depression / Suicide Risk    As you are discharged from this [facility], it is important to learn how to keep safe from harming yourself.    Recognize the warning signs:  · Abrupt changes in personality, positive or negative- including increase in energy   · Giving away possessions  · Change in eating patterns- significant weight changes-  positive or negative  · Change in sleeping patterns- unable to sleep or sleeping all the time   · Unwillingness or inability to communicate  · Depression  · Unusual sadness, discouragement and loneliness  · Talk of wanting to die  · Neglect of personal appearance   · Rebelliousness- reckless behavior  · Withdrawal from people/activities they love  · Confusion- inability to concentrate     If you or a loved one observes any of these behaviors or has concerns about self-harm, here's what you can do:  · Talk about it- your feelings and reasons for harming yourself  · Remove any means that you might use to hurt yourself (examples: pills, rope, extension cords, firearm)  · Get professional help from the community (Mental Health, Substance Abuse, psychological counseling)  · Do not be alone:Call your Safe Contact- someone whom you trust who will be there  for you.  · Call your local CRISIS HOTLINE 930-2083 or 388-706-9609  · Call your local Children's Mobile Crisis Response Team [place] (827) 055-5478 or www.[employer]  · Call the toll free National Suicide Prevention Hotlines   · National Suicide Prevention Lifeline 859-848-AORK (1382)  · [employer] Development Line Network 800-SUICIDE (439-4713)

## 2020-12-03 NOTE — PROGRESS NOTES
Patient denies aches or pains and denies SOB or fatigue, patient states she spoke to rheumatologist yesterday and appointment scheduled for this following Friday.

## 2020-12-03 NOTE — PROGRESS NOTES
Discharge instructions given to patient verbalized understanding.  Patient states she has followup appointment scheduled December 5 with rheumatologist.  Awaiting family pickup.

## 2020-12-03 NOTE — CONSULTS
DATE OF SERVICE:  12/01/2020     NEPHROLOGY CONSULTATION     REQUESTING PHYSICIAN:  Dr. Moffett.     REASON FOR CONSULTATION:  Management of end-stage renal disease, assessing the   need for dialysis.     The patient seen and examined, medical record reviewed and copy given.     HISTORY OF PRESENT ILLNESS:  The patient is a 25-year-old lady who is very   well known to our service.  She is under the care of my associate, Dr. Melinda Trent.  She has end-stage renal disease and undergoes hemodialysis on a Monday,   Wednesday, Friday; has history of lupus, presented to the hospital last night   with shortness of breath.  The patient diagnosed with volume overload and   pulmonary edema.  We were called to manage her respiratory failure, assess   need for urgent dialysis.     The patient has no fever, no chills.  She feels fatigued.  No cough and no   hemoptysis.     Her last dialysis was on Monday, 11/30/2020.     PAST MEDICAL HISTORY:  Significant for:  1.  End-stage renal disease.  2.  SLE.   3.  Lupus nephritis.  4.  Hypertension.     ALLERGIES:  The list was reviewed.     SOCIAL HISTORY:  The patient has no smoking or alcohol use.     FAMILY HISTORY:  Positive for diabetes.     MEDICATIONS:  Reviewed.     REVIEW OF SYSTEMS:  All systems were reviewed; they were negative except   outlined as present.     PHYSICAL EXAMINATION:    GENERAL:  The patient appears in mild respiratory distress.  VITAL SIGNS:  Show a blood pressure of 187/133, heart rate was 98, respiratory   rate was 16.  HEENT:  Normocephalic, atraumatic.  Sclerae are anicteric.  Pupils are   reactive.  Nose normal.  Mucous membranes moist.  NECK:  No lymphadenopathy.  No JVD.  No thyroid mass.  CHEST:  Normal.  LUNGS:  Scattered rhonchi.  HEART:  S1, S2.  ABDOMEN:  Soft, nontender.  No hepatosplenomegaly.  There is no inguinal   lymphadenopathy.  EXTREMITIES:  There is +1 edema.  SKIN:  Multiple ecchymoses.  PSYCHIATRIC:  The patient is alert, oriented  x3.  NEUROLOGIC:  Mood is normal.     LABORATORY DATA:  Recent labs were reviewed.  She had a chest x-ray, which I   reviewed the images myself, showed airspace disease.     ASSESSMENT:  1.  End-stage renal disease.  2.  Pulmonary edema.  3.  Hypertension urgency.  4.  Anemia.     PLAN:  1.  We will plan urgent dialysis to manage her respiratory failure in the   setting of end-stage renal disease.  2.  Renal dose all medications.  3.  Avoid nephrotoxins.  4.  Erythropoietin with dialysis initiated.     PROGNOSIS:  Guarded.     Plan discussed in detail with Dr. Moffett.        ______________________________________________  FADI NAJJAR, MD FN/NHI    DD:  12/02/2020 14:28  DT:  12/02/2020 17:55    Job#:  [id number]

## 2020-12-07 LAB
BACTERIA BLD CULT: NORMAL
BACTERIA BLD CULT: NORMAL
SIGNIFICANT IND 70042: NORMAL
SIGNIFICANT IND 70042: NORMAL
SITE SITE: NORMAL
SITE SITE: NORMAL
SOURCE SOURCE: NORMAL
SOURCE SOURCE: NORMAL

## 2020-12-09 ENCOUNTER — TELEPHONE (OUTPATIENT)
Dept: ONCOLOGY | Facility: MEDICAL CENTER | Age: 25
End: 2020-12-09

## 2020-12-10 ENCOUNTER — OUTPATIENT INFUSION SERVICES (OUTPATIENT)
Dept: ONCOLOGY | Facility: MEDICAL CENTER | Age: 25
End: 2020-12-10
Attending: INTERNAL MEDICINE
Payer: COMMERCIAL

## 2020-12-10 VITALS
RESPIRATION RATE: 18 BRPM | OXYGEN SATURATION: 100 % | DIASTOLIC BLOOD PRESSURE: 81 MMHG | TEMPERATURE: 97.8 F | BODY MASS INDEX: 20.62 KG/M2 | WEIGHT: 131.39 LBS | HEART RATE: 108 BPM | SYSTOLIC BLOOD PRESSURE: 137 MMHG | HEIGHT: 67 IN

## 2020-12-10 DIAGNOSIS — D63.8 ANEMIA, CHRONIC DISEASE: ICD-10-CM

## 2020-12-10 LAB
BASOPHILS # BLD AUTO: 0.6 % (ref 0–1.8)
BASOPHILS # BLD: 0.03 K/UL (ref 0–0.12)
EOSINOPHIL # BLD AUTO: 0.03 K/UL (ref 0–0.51)
EOSINOPHIL NFR BLD: 0.6 % (ref 0–6.9)
ERYTHROCYTE [DISTWIDTH] IN BLOOD BY AUTOMATED COUNT: 60.5 FL (ref 35.9–50)
HCT VFR BLD AUTO: 26.9 % (ref 37–47)
HGB BLD-MCNC: 8.1 G/DL (ref 12–16)
IMM GRANULOCYTES # BLD AUTO: 0.02 K/UL (ref 0–0.11)
IMM GRANULOCYTES NFR BLD AUTO: 0.4 % (ref 0–0.9)
LYMPHOCYTES # BLD AUTO: 0.51 K/UL (ref 1–4.8)
LYMPHOCYTES NFR BLD: 10.2 % (ref 22–41)
MCH RBC QN AUTO: 28.7 PG (ref 27–33)
MCHC RBC AUTO-ENTMCNC: 30.1 G/DL (ref 33.6–35)
MCV RBC AUTO: 95.4 FL (ref 81.4–97.8)
MONOCYTES # BLD AUTO: 0.31 K/UL (ref 0–0.85)
MONOCYTES NFR BLD AUTO: 6.2 % (ref 0–13.4)
NEUTROPHILS # BLD AUTO: 4.1 K/UL (ref 2–7.15)
NEUTROPHILS NFR BLD: 82 % (ref 44–72)
NRBC # BLD AUTO: 0 K/UL
NRBC BLD-RTO: 0 /100 WBC
PLATELET # BLD AUTO: 75 K/UL (ref 164–446)
PMV BLD AUTO: 12 FL (ref 9–12.9)
RBC # BLD AUTO: 2.82 M/UL (ref 4.2–5.4)
WBC # BLD AUTO: 5 K/UL (ref 4.8–10.8)

## 2020-12-10 PROCEDURE — 36415 COLL VENOUS BLD VENIPUNCTURE: CPT

## 2020-12-10 PROCEDURE — 85025 COMPLETE CBC W/AUTO DIFF WBC: CPT

## 2020-12-10 RX ORDER — ACETAMINOPHEN 325 MG/1
650 TABLET ORAL ONCE
Status: CANCELLED | OUTPATIENT
Start: 2020-12-10 | End: 2020-12-10

## 2020-12-10 RX ORDER — DIPHENHYDRAMINE HCL 25 MG
25 TABLET ORAL ONCE
Status: CANCELLED | OUTPATIENT
Start: 2020-12-10 | End: 2020-12-10

## 2020-12-10 RX ORDER — HEPARIN SODIUM (PORCINE) LOCK FLUSH IV SOLN 100 UNIT/ML 100 UNIT/ML
500 SOLUTION INTRAVENOUS PRN
Status: CANCELLED | OUTPATIENT
Start: 2020-12-10

## 2020-12-10 RX ORDER — DIPHENHYDRAMINE HYDROCHLORIDE 50 MG/ML
25 INJECTION INTRAMUSCULAR; INTRAVENOUS PRN
Status: CANCELLED | OUTPATIENT
Start: 2020-12-10

## 2020-12-10 RX ORDER — SODIUM CHLORIDE 9 MG/ML
500 INJECTION, SOLUTION INTRAVENOUS ONCE
Status: CANCELLED | OUTPATIENT
Start: 2020-12-10 | End: 2020-12-10

## 2020-12-10 RX ORDER — ACETAMINOPHEN 325 MG/1
650 TABLET ORAL PRN
Status: CANCELLED | OUTPATIENT
Start: 2020-12-10

## 2020-12-10 RX ORDER — LIDOCAINE HYDROCHLORIDE 10 MG/ML
20 INJECTION, SOLUTION INFILTRATION; PERINEURAL
Status: CANCELLED | OUTPATIENT
Start: 2020-12-10

## 2020-12-10 RX ORDER — SODIUM CHLORIDE 9 MG/ML
10 INJECTION, SOLUTION INTRAMUSCULAR; INTRAVENOUS; SUBCUTANEOUS PRN
Status: CANCELLED | OUTPATIENT
Start: 2020-12-10

## 2020-12-10 ASSESSMENT — FIBROSIS 4 INDEX: FIB4 SCORE: 1.68

## 2020-12-10 NOTE — PROGRESS NOTES
Pt presented for lab draw prior to possible blood. She reported she was asymptomatic. CBC drawn from [facility] with 23G butterfly needle on 1st attempt, gauze and coban drsg placed. Pt left to self care. Results revealed hemoglobin of 8.1. Pt did NOT meet parameters for blood products, appt on Sat cancelled, spoke to pt on phone to confirm. Has next f/u appt on 12/24 for next CBC check.

## 2020-12-12 ENCOUNTER — APPOINTMENT (OUTPATIENT)
Dept: ONCOLOGY | Facility: MEDICAL CENTER | Age: 25
End: 2020-12-12
Attending: INTERNAL MEDICINE
Payer: COMMERCIAL

## 2020-12-24 ENCOUNTER — OUTPATIENT INFUSION SERVICES (OUTPATIENT)
Dept: ONCOLOGY | Facility: MEDICAL CENTER | Age: 25
End: 2020-12-24
Attending: INTERNAL MEDICINE
Payer: COMMERCIAL

## 2020-12-24 VITALS
OXYGEN SATURATION: 100 % | HEART RATE: 100 BPM | TEMPERATURE: 97.4 F | RESPIRATION RATE: 17 BRPM | DIASTOLIC BLOOD PRESSURE: 83 MMHG | SYSTOLIC BLOOD PRESSURE: 125 MMHG

## 2020-12-24 DIAGNOSIS — D63.8 ANEMIA, CHRONIC DISEASE: ICD-10-CM

## 2020-12-24 LAB
ABO GROUP BLD: NORMAL
ANISOCYTOSIS BLD QL SMEAR: ABNORMAL
BARCODED ABORH UBTYP: 5100
BARCODED ABORH UBTYP: 5100
BARCODED PRD CODE UBPRD: NORMAL
BARCODED PRD CODE UBPRD: NORMAL
BARCODED UNIT NUM UBUNT: NORMAL
BARCODED UNIT NUM UBUNT: NORMAL
BASOPHILS # BLD AUTO: 0.9 % (ref 0–1.8)
BASOPHILS # BLD: 0.03 K/UL (ref 0–0.12)
BLD GP AB SCN SERPL QL: NORMAL
COMPONENT R 8504R: NORMAL
COMPONENT R 8504R: NORMAL
EOSINOPHIL # BLD AUTO: 0 K/UL (ref 0–0.51)
EOSINOPHIL NFR BLD: 0 % (ref 0–6.9)
ERYTHROCYTE [DISTWIDTH] IN BLOOD BY AUTOMATED COUNT: 69.5 FL (ref 35.9–50)
GIANT PLATELETS BLD QL SMEAR: NORMAL
HCT VFR BLD AUTO: 21.3 % (ref 37–47)
HGB BLD-MCNC: 6.1 G/DL (ref 12–16)
LG PLATELETS BLD QL SMEAR: NORMAL
LYMPHOCYTES # BLD AUTO: 0.38 K/UL (ref 1–4.8)
LYMPHOCYTES NFR BLD: 12 % (ref 22–41)
MACROCYTES BLD QL SMEAR: ABNORMAL
MANUAL DIFF BLD: NORMAL
MCH RBC QN AUTO: 28.9 PG (ref 27–33)
MCHC RBC AUTO-ENTMCNC: 28.2 G/DL (ref 33.6–35)
MCV RBC AUTO: 102.4 FL (ref 81.4–97.8)
MONOCYTES # BLD AUTO: 0.21 K/UL (ref 0–0.85)
MONOCYTES NFR BLD AUTO: 6.5 % (ref 0–13.4)
MORPHOLOGY BLD-IMP: NORMAL
NEUTROPHILS # BLD AUTO: 2.58 K/UL (ref 2–7.15)
NEUTROPHILS NFR BLD: 80.6 % (ref 44–72)
NRBC # BLD AUTO: 0 K/UL
NRBC BLD-RTO: 0 /100 WBC
PLATELET # BLD AUTO: 46 K/UL (ref 164–446)
PLATELET BLD QL SMEAR: NORMAL
PLATELETS.RETICULATED NFR BLD AUTO: 9.6 K/UL (ref 0.6–13.1)
PMV BLD AUTO: 13.1 FL (ref 9–12.9)
POLYCHROMASIA BLD QL SMEAR: NORMAL
PRODUCT TYPE UPROD: NORMAL
PRODUCT TYPE UPROD: NORMAL
RBC # BLD AUTO: 2.11 M/UL (ref 4.2–5.4)
RBC BLD AUTO: PRESENT
RH BLD: NORMAL
UNIT STATUS USTAT: NORMAL
UNIT STATUS USTAT: NORMAL
WBC # BLD AUTO: 3.2 K/UL (ref 4.8–10.8)

## 2020-12-24 PROCEDURE — 36415 COLL VENOUS BLD VENIPUNCTURE: CPT

## 2020-12-24 PROCEDURE — 85007 BL SMEAR W/DIFF WBC COUNT: CPT

## 2020-12-24 PROCEDURE — 85027 COMPLETE CBC AUTOMATED: CPT

## 2020-12-24 PROCEDURE — 85055 RETICULATED PLATELET ASSAY: CPT

## 2020-12-24 PROCEDURE — 86900 BLOOD TYPING SEROLOGIC ABO: CPT

## 2020-12-24 PROCEDURE — 86850 RBC ANTIBODY SCREEN: CPT

## 2020-12-24 PROCEDURE — 86901 BLOOD TYPING SEROLOGIC RH(D): CPT

## 2020-12-24 RX ORDER — DIPHENHYDRAMINE HCL 25 MG
25 TABLET ORAL ONCE
Status: CANCELLED | OUTPATIENT
Start: 2020-12-24 | End: 2020-12-24

## 2020-12-24 RX ORDER — ACETAMINOPHEN 325 MG/1
650 TABLET ORAL ONCE
Status: CANCELLED | OUTPATIENT
Start: 2020-12-24 | End: 2020-12-24

## 2020-12-24 RX ORDER — SODIUM CHLORIDE 9 MG/ML
500 INJECTION, SOLUTION INTRAVENOUS ONCE
Status: CANCELLED | OUTPATIENT
Start: 2020-12-24 | End: 2020-12-24

## 2020-12-24 RX ORDER — LIDOCAINE HYDROCHLORIDE 10 MG/ML
20 INJECTION, SOLUTION INFILTRATION; PERINEURAL
Status: CANCELLED | OUTPATIENT
Start: 2020-12-24

## 2020-12-24 RX ORDER — DIPHENHYDRAMINE HYDROCHLORIDE 50 MG/ML
25 INJECTION INTRAMUSCULAR; INTRAVENOUS PRN
Status: CANCELLED | OUTPATIENT
Start: 2020-12-24

## 2020-12-24 RX ORDER — HEPARIN SODIUM (PORCINE) LOCK FLUSH IV SOLN 100 UNIT/ML 100 UNIT/ML
500 SOLUTION INTRAVENOUS PRN
Status: CANCELLED | OUTPATIENT
Start: 2020-12-24

## 2020-12-24 RX ORDER — SODIUM CHLORIDE 9 MG/ML
10 INJECTION, SOLUTION INTRAMUSCULAR; INTRAVENOUS; SUBCUTANEOUS PRN
Status: CANCELLED | OUTPATIENT
Start: 2020-12-24

## 2020-12-24 RX ORDER — ACETAMINOPHEN 325 MG/1
650 TABLET ORAL PRN
Status: CANCELLED | OUTPATIENT
Start: 2020-12-24

## 2020-12-24 NOTE — PROGRESS NOTES
Pt presents to [facility] for labs for possible blood products. Butterfly needle used; brisk blood return observed and pt tolerated well. Labs drawn and within treatable parameters (Hem 6.1/ Plat 46). Gauze/coband dressing applied. Next appointment confirmed and education provided. Pt discharged to self care with all personal belongings and in NAD.

## 2020-12-26 ENCOUNTER — OUTPATIENT INFUSION SERVICES (OUTPATIENT)
Dept: ONCOLOGY | Facility: MEDICAL CENTER | Age: 25
End: 2020-12-26
Attending: INTERNAL MEDICINE
Payer: COMMERCIAL

## 2020-12-26 VITALS
TEMPERATURE: 98.1 F | BODY MASS INDEX: 20.52 KG/M2 | RESPIRATION RATE: 16 BRPM | SYSTOLIC BLOOD PRESSURE: 122 MMHG | DIASTOLIC BLOOD PRESSURE: 72 MMHG | HEART RATE: 92 BPM | HEIGHT: 67 IN | OXYGEN SATURATION: 99 % | WEIGHT: 130.73 LBS

## 2020-12-26 DIAGNOSIS — D63.8 ANEMIA, CHRONIC DISEASE: ICD-10-CM

## 2020-12-26 LAB
BARCODED ABORH UBTYP: 5100
BARCODED ABORH UBTYP: 5100
BARCODED PRD CODE UBPRD: NORMAL
BARCODED PRD CODE UBPRD: NORMAL
BARCODED UNIT NUM UBUNT: NORMAL
BARCODED UNIT NUM UBUNT: NORMAL
COMPONENT R 8504R: NORMAL
COMPONENT R 8504R: NORMAL
PRODUCT TYPE UPROD: NORMAL
PRODUCT TYPE UPROD: NORMAL
UNIT STATUS USTAT: NORMAL
UNIT STATUS USTAT: NORMAL

## 2020-12-26 PROCEDURE — 36430 TRANSFUSION BLD/BLD COMPNT: CPT

## 2020-12-26 PROCEDURE — 700102 HCHG RX REV CODE 250 W/ 637 OVERRIDE(OP): Performed by: INTERNAL MEDICINE

## 2020-12-26 PROCEDURE — 306780 HCHG STAT FOR TRANSFUSION PER CASE

## 2020-12-26 PROCEDURE — 86902 BLOOD TYPE ANTIGEN DONOR EA: CPT | Mod: 91

## 2020-12-26 PROCEDURE — P9016 RBC LEUKOCYTES REDUCED: HCPCS | Mod: 91

## 2020-12-26 PROCEDURE — 86922 COMPATIBILITY TEST ANTIGLOB: CPT

## 2020-12-26 PROCEDURE — A9270 NON-COVERED ITEM OR SERVICE: HCPCS | Performed by: INTERNAL MEDICINE

## 2020-12-26 RX ORDER — ACETAMINOPHEN 325 MG/1
650 TABLET ORAL PRN
Status: CANCELLED | OUTPATIENT
Start: 2020-12-26

## 2020-12-26 RX ORDER — DIPHENHYDRAMINE HCL 25 MG
25 TABLET ORAL ONCE
Status: COMPLETED | OUTPATIENT
Start: 2020-12-26 | End: 2020-12-26

## 2020-12-26 RX ORDER — LIDOCAINE HYDROCHLORIDE 10 MG/ML
20 INJECTION, SOLUTION INFILTRATION; PERINEURAL
Status: CANCELLED | OUTPATIENT
Start: 2020-12-26

## 2020-12-26 RX ORDER — ACETAMINOPHEN 325 MG/1
650 TABLET ORAL ONCE
Status: CANCELLED | OUTPATIENT
Start: 2020-12-26 | End: 2020-12-26

## 2020-12-26 RX ORDER — ACETAMINOPHEN 325 MG/1
650 TABLET ORAL ONCE
Status: COMPLETED | OUTPATIENT
Start: 2020-12-26 | End: 2020-12-26

## 2020-12-26 RX ORDER — DIPHENHYDRAMINE HYDROCHLORIDE 50 MG/ML
25 INJECTION INTRAMUSCULAR; INTRAVENOUS PRN
Status: CANCELLED | OUTPATIENT
Start: 2020-12-26

## 2020-12-26 RX ORDER — SODIUM CHLORIDE 9 MG/ML
500 INJECTION, SOLUTION INTRAVENOUS ONCE
Status: CANCELLED | OUTPATIENT
Start: 2020-12-26 | End: 2020-12-26

## 2020-12-26 RX ORDER — SODIUM CHLORIDE 9 MG/ML
10 INJECTION, SOLUTION INTRAMUSCULAR; INTRAVENOUS; SUBCUTANEOUS PRN
Status: CANCELLED | OUTPATIENT
Start: 2020-12-26

## 2020-12-26 RX ORDER — DIPHENHYDRAMINE HCL 25 MG
25 TABLET ORAL ONCE
Status: CANCELLED | OUTPATIENT
Start: 2020-12-26 | End: 2020-12-26

## 2020-12-26 RX ORDER — HEPARIN SODIUM (PORCINE) LOCK FLUSH IV SOLN 100 UNIT/ML 100 UNIT/ML
500 SOLUTION INTRAVENOUS PRN
Status: CANCELLED | OUTPATIENT
Start: 2020-12-26

## 2020-12-26 RX ADMIN — ACETAMINOPHEN 650 MG: 325 TABLET ORAL at 11:34

## 2020-12-26 RX ADMIN — DIPHENHYDRAMINE HYDROCHLORIDE 25 MG: 25 TABLET ORAL at 11:34

## 2020-12-26 ASSESSMENT — FIBROSIS 4 INDEX: FIB4 SCORE: 4.74

## 2020-12-26 NOTE — PROGRESS NOTES
Lily arrived ambulatory to [facility] for blood products. POC discussed with pt and she agrees with plan. PIV established, brisk blood return noted. Pt transfused with 2 units PRBC's as ordered. Pt tolerated treatment without s/s adverse reaction. PIV dc'd catheter tip intact, gauze and coban dressing applied. Pt discharged to self care, [facility].

## 2021-01-06 ENCOUNTER — HOSPITAL ENCOUNTER (INPATIENT)
Facility: MEDICAL CENTER | Age: 26
LOS: 3 days | DRG: 377 | End: 2021-01-09
Attending: EMERGENCY MEDICINE | Admitting: INTERNAL MEDICINE
Payer: COMMERCIAL

## 2021-01-06 ENCOUNTER — APPOINTMENT (OUTPATIENT)
Dept: RADIOLOGY | Facility: MEDICAL CENTER | Age: 26
DRG: 377 | End: 2021-01-06
Attending: EMERGENCY MEDICINE
Payer: COMMERCIAL

## 2021-01-06 ENCOUNTER — OUTPATIENT INFUSION SERVICES (OUTPATIENT)
Dept: ONCOLOGY | Facility: MEDICAL CENTER | Age: 26
End: 2021-01-06
Attending: INTERNAL MEDICINE
Payer: COMMERCIAL

## 2021-01-06 VITALS
DIASTOLIC BLOOD PRESSURE: 62 MMHG | SYSTOLIC BLOOD PRESSURE: 103 MMHG | RESPIRATION RATE: 18 BRPM | WEIGHT: 130.07 LBS | BODY MASS INDEX: 20.42 KG/M2 | OXYGEN SATURATION: 100 % | HEART RATE: 110 BPM | TEMPERATURE: 98 F | HEIGHT: 67 IN

## 2021-01-06 DIAGNOSIS — N18.6 ANEMIA DUE TO CHRONIC KIDNEY DISEASE, ON CHRONIC DIALYSIS (HCC): ICD-10-CM

## 2021-01-06 DIAGNOSIS — Z99.2 ANEMIA DUE TO CHRONIC KIDNEY DISEASE, ON CHRONIC DIALYSIS (HCC): ICD-10-CM

## 2021-01-06 DIAGNOSIS — D63.8 ANEMIA, CHRONIC DISEASE: ICD-10-CM

## 2021-01-06 DIAGNOSIS — N18.6 ESRD (END STAGE RENAL DISEASE) (HCC): ICD-10-CM

## 2021-01-06 DIAGNOSIS — D64.9 ANEMIA, UNSPECIFIED TYPE: ICD-10-CM

## 2021-01-06 DIAGNOSIS — K92.2 UGIB (UPPER GASTROINTESTINAL BLEED): ICD-10-CM

## 2021-01-06 DIAGNOSIS — D63.1 ANEMIA DUE TO CHRONIC KIDNEY DISEASE, ON CHRONIC DIALYSIS (HCC): ICD-10-CM

## 2021-01-06 LAB
ABO GROUP BLD: NORMAL
ALBUMIN SERPL BCP-MCNC: 3 G/DL (ref 3.2–4.9)
ALBUMIN/GLOB SERPL: 0.7 G/DL
ALP SERPL-CCNC: 70 U/L (ref 30–99)
ALT SERPL-CCNC: 11 U/L (ref 2–50)
ANION GAP SERPL CALC-SCNC: 11 MMOL/L (ref 7–16)
ANISOCYTOSIS BLD QL SMEAR: ABNORMAL
APTT PPP: 28.9 SEC (ref 24.7–36)
AST SERPL-CCNC: 29 U/L (ref 12–45)
BARCODED ABORH UBTYP: 5100
BARCODED PRD CODE UBPRD: NORMAL
BARCODED UNIT NUM UBUNT: NORMAL
BASOPHILS # BLD AUTO: 0.7 % (ref 0–1.8)
BASOPHILS # BLD AUTO: 1.2 % (ref 0–1.8)
BASOPHILS # BLD: 0.03 K/UL (ref 0–0.12)
BASOPHILS # BLD: 0.05 K/UL (ref 0–0.12)
BILIRUB SERPL-MCNC: 0.3 MG/DL (ref 0.1–1.5)
BLD GP AB SCN SERPL QL: NORMAL
BUN SERPL-MCNC: 25 MG/DL (ref 8–22)
CALCIUM SERPL-MCNC: 8.6 MG/DL (ref 8.4–10.2)
CHLORIDE SERPL-SCNC: 91 MMOL/L (ref 96–112)
CO2 SERPL-SCNC: 32 MMOL/L (ref 20–33)
COMMENT 1642: NORMAL
COMPONENT R 8504R: NORMAL
COVID ORDER STATUS COVID19: NORMAL
CREAT SERPL-MCNC: 3.35 MG/DL (ref 0.5–1.4)
EKG IMPRESSION: NORMAL
EOSINOPHIL # BLD AUTO: 0.01 K/UL (ref 0–0.51)
EOSINOPHIL # BLD AUTO: 0.03 K/UL (ref 0–0.51)
EOSINOPHIL NFR BLD: 0.2 % (ref 0–6.9)
EOSINOPHIL NFR BLD: 0.7 % (ref 0–6.9)
ERYTHROCYTE [DISTWIDTH] IN BLOOD BY AUTOMATED COUNT: 59.8 FL (ref 35.9–50)
ERYTHROCYTE [DISTWIDTH] IN BLOOD BY AUTOMATED COUNT: 59.9 FL (ref 35.9–50)
GLOBULIN SER CALC-MCNC: 4.6 G/DL (ref 1.9–3.5)
GLUCOSE SERPL-MCNC: 96 MG/DL (ref 65–99)
HCG SERPL QL: NEGATIVE
HCT VFR BLD AUTO: 15.7 % (ref 37–47)
HCT VFR BLD AUTO: 15.8 % (ref 37–47)
HGB BLD-MCNC: 4.6 G/DL (ref 12–16)
HGB BLD-MCNC: 4.6 G/DL (ref 12–16)
HYPOCHROMIA BLD QL SMEAR: ABNORMAL
IMM GRANULOCYTES # BLD AUTO: 0.02 K/UL (ref 0–0.11)
IMM GRANULOCYTES # BLD AUTO: 0.03 K/UL (ref 0–0.11)
IMM GRANULOCYTES NFR BLD AUTO: 0.5 % (ref 0–0.9)
IMM GRANULOCYTES NFR BLD AUTO: 0.7 % (ref 0–0.9)
INR PPP: 1.16 (ref 0.87–1.13)
LYMPHOCYTES # BLD AUTO: 0.58 K/UL (ref 1–4.8)
LYMPHOCYTES # BLD AUTO: 0.72 K/UL (ref 1–4.8)
LYMPHOCYTES NFR BLD: 13.2 % (ref 22–41)
LYMPHOCYTES NFR BLD: 16.9 % (ref 22–41)
MACROCYTES BLD QL SMEAR: ABNORMAL
MCH RBC QN AUTO: 28.7 PG (ref 27–33)
MCH RBC QN AUTO: 29 PG (ref 27–33)
MCHC RBC AUTO-ENTMCNC: 28.7 G/DL (ref 33.6–35)
MCHC RBC AUTO-ENTMCNC: 28.7 G/DL (ref 33.6–35)
MCV RBC AUTO: 100 FL (ref 81.4–97.8)
MCV RBC AUTO: 101.3 FL (ref 81.4–97.8)
MONOCYTES # BLD AUTO: 0.26 K/UL (ref 0–0.85)
MONOCYTES # BLD AUTO: 0.38 K/UL (ref 0–0.85)
MONOCYTES NFR BLD AUTO: 5.9 % (ref 0–13.4)
MONOCYTES NFR BLD AUTO: 8.9 % (ref 0–13.4)
MORPHOLOGY BLD-IMP: NORMAL
NEUTROPHILS # BLD AUTO: 3.09 K/UL (ref 2–7.15)
NEUTROPHILS # BLD AUTO: 3.46 K/UL (ref 2–7.15)
NEUTROPHILS NFR BLD: 72.3 % (ref 44–72)
NEUTROPHILS NFR BLD: 78.8 % (ref 44–72)
NRBC # BLD AUTO: 0 K/UL
NRBC # BLD AUTO: 0 K/UL
NRBC BLD-RTO: 0 /100 WBC
NRBC BLD-RTO: 0 /100 WBC
PLATELET # BLD AUTO: 39 K/UL (ref 164–446)
PLATELET # BLD AUTO: 47 K/UL (ref 164–446)
PLATELET BLD QL SMEAR: NORMAL
PLATELETS.RETICULATED NFR BLD AUTO: 13.7 K/UL (ref 0.6–13.1)
PMV BLD AUTO: 12.7 FL (ref 9–12.9)
PMV BLD AUTO: 13.6 FL (ref 9–12.9)
POLYCHROMASIA BLD QL SMEAR: NORMAL
POTASSIUM SERPL-SCNC: 4.4 MMOL/L (ref 3.6–5.5)
PRODUCT TYPE UPROD: NORMAL
PROT SERPL-MCNC: 7.6 G/DL (ref 6–8.2)
PROTHROMBIN TIME: 14.5 SEC (ref 12–14.6)
RBC # BLD AUTO: 1.55 M/UL (ref 4.2–5.4)
RBC # BLD AUTO: 1.57 M/UL (ref 4.2–5.4)
RBC BLD AUTO: PRESENT
RH BLD: NORMAL
SODIUM SERPL-SCNC: 134 MMOL/L (ref 135–145)
UNIT STATUS USTAT: NORMAL
WBC # BLD AUTO: 4.3 K/UL (ref 4.8–10.8)
WBC # BLD AUTO: 4.4 K/UL (ref 4.8–10.8)

## 2021-01-06 PROCEDURE — 85730 THROMBOPLASTIN TIME PARTIAL: CPT

## 2021-01-06 PROCEDURE — C9113 INJ PANTOPRAZOLE SODIUM, VIA: HCPCS | Performed by: EMERGENCY MEDICINE

## 2021-01-06 PROCEDURE — 85055 RETICULATED PLATELET ASSAY: CPT

## 2021-01-06 PROCEDURE — 86900 BLOOD TYPING SEROLOGIC ABO: CPT

## 2021-01-06 PROCEDURE — 83540 ASSAY OF IRON: CPT

## 2021-01-06 PROCEDURE — 700111 HCHG RX REV CODE 636 W/ 250 OVERRIDE (IP): Performed by: INTERNAL MEDICINE

## 2021-01-06 PROCEDURE — 84703 CHORIONIC GONADOTROPIN ASSAY: CPT

## 2021-01-06 PROCEDURE — 96376 TX/PRO/DX INJ SAME DRUG ADON: CPT

## 2021-01-06 PROCEDURE — 82746 ASSAY OF FOLIC ACID SERUM: CPT

## 2021-01-06 PROCEDURE — 700111 HCHG RX REV CODE 636 W/ 250 OVERRIDE (IP): Performed by: EMERGENCY MEDICINE

## 2021-01-06 PROCEDURE — 82607 VITAMIN B-12: CPT

## 2021-01-06 PROCEDURE — 86850 RBC ANTIBODY SCREEN: CPT

## 2021-01-06 PROCEDURE — 99220 PR INITIAL OBSERVATION CARE,LEVL III: CPT | Performed by: INTERNAL MEDICINE

## 2021-01-06 PROCEDURE — 96375 TX/PRO/DX INJ NEW DRUG ADDON: CPT

## 2021-01-06 PROCEDURE — U0003 INFECTIOUS AGENT DETECTION BY NUCLEIC ACID (DNA OR RNA); SEVERE ACUTE RESPIRATORY SYNDROME CORONAVIRUS 2 (SARS-COV-2) (CORONAVIRUS DISEASE [COVID-19]), AMPLIFIED PROBE TECHNIQUE, MAKING USE OF HIGH THROUGHPUT TECHNOLOGIES AS DESCRIBED BY CMS-2020-01-R: HCPCS

## 2021-01-06 PROCEDURE — 86922 COMPATIBILITY TEST ANTIGLOB: CPT

## 2021-01-06 PROCEDURE — 770022 HCHG ROOM/CARE - ICU (200)

## 2021-01-06 PROCEDURE — 99291 CRITICAL CARE FIRST HOUR: CPT

## 2021-01-06 PROCEDURE — 80053 COMPREHEN METABOLIC PANEL: CPT

## 2021-01-06 PROCEDURE — 82728 ASSAY OF FERRITIN: CPT

## 2021-01-06 PROCEDURE — 85025 COMPLETE CBC W/AUTO DIFF WBC: CPT

## 2021-01-06 PROCEDURE — 83550 IRON BINDING TEST: CPT

## 2021-01-06 PROCEDURE — 71045 X-RAY EXAM CHEST 1 VIEW: CPT

## 2021-01-06 PROCEDURE — 96365 THER/PROPH/DIAG IV INF INIT: CPT

## 2021-01-06 PROCEDURE — G0378 HOSPITAL OBSERVATION PER HR: HCPCS

## 2021-01-06 PROCEDURE — 36415 COLL VENOUS BLD VENIPUNCTURE: CPT

## 2021-01-06 PROCEDURE — 93005 ELECTROCARDIOGRAM TRACING: CPT | Performed by: EMERGENCY MEDICINE

## 2021-01-06 PROCEDURE — 85025 COMPLETE CBC W/AUTO DIFF WBC: CPT | Mod: 91

## 2021-01-06 PROCEDURE — 700105 HCHG RX REV CODE 258: Performed by: INTERNAL MEDICINE

## 2021-01-06 PROCEDURE — C9803 HOPD COVID-19 SPEC COLLECT: HCPCS | Performed by: INTERNAL MEDICINE

## 2021-01-06 PROCEDURE — 86901 BLOOD TYPING SEROLOGIC RH(D): CPT

## 2021-01-06 PROCEDURE — U0005 INFEC AGEN DETEC AMPLI PROBE: HCPCS

## 2021-01-06 PROCEDURE — C9113 INJ PANTOPRAZOLE SODIUM, VIA: HCPCS | Performed by: INTERNAL MEDICINE

## 2021-01-06 PROCEDURE — 85610 PROTHROMBIN TIME: CPT

## 2021-01-06 RX ORDER — ONDANSETRON 2 MG/ML
4 INJECTION INTRAMUSCULAR; INTRAVENOUS EVERY 4 HOURS PRN
Status: DISCONTINUED | OUTPATIENT
Start: 2021-01-06 | End: 2021-01-09 | Stop reason: HOSPADM

## 2021-01-06 RX ORDER — PANTOPRAZOLE SODIUM 40 MG/10ML
80 INJECTION, POWDER, LYOPHILIZED, FOR SOLUTION INTRAVENOUS ONCE
Status: COMPLETED | OUTPATIENT
Start: 2021-01-06 | End: 2021-01-06

## 2021-01-06 RX ORDER — MORPHINE SULFATE 4 MG/ML
4 INJECTION, SOLUTION INTRAMUSCULAR; INTRAVENOUS ONCE
Status: COMPLETED | OUTPATIENT
Start: 2021-01-06 | End: 2021-01-06

## 2021-01-06 RX ORDER — FUROSEMIDE 40 MG/1
40 TABLET ORAL DAILY
Status: DISCONTINUED | OUTPATIENT
Start: 2021-01-07 | End: 2021-01-09 | Stop reason: HOSPADM

## 2021-01-06 RX ORDER — ATENOLOL 25 MG/1
25 TABLET ORAL EVERY EVENING
Status: DISCONTINUED | OUTPATIENT
Start: 2021-01-07 | End: 2021-01-09 | Stop reason: HOSPADM

## 2021-01-06 RX ORDER — OMEPRAZOLE 20 MG/1
20 CAPSULE, DELAYED RELEASE ORAL 2 TIMES DAILY
Status: DISCONTINUED | OUTPATIENT
Start: 2021-01-06 | End: 2021-01-06

## 2021-01-06 RX ORDER — SODIUM CHLORIDE 9 MG/ML
INJECTION, SOLUTION INTRAVENOUS CONTINUOUS
Status: ACTIVE | OUTPATIENT
Start: 2021-01-06 | End: 2021-01-07

## 2021-01-06 RX ORDER — PROMETHAZINE HYDROCHLORIDE 25 MG/1
12.5-25 SUPPOSITORY RECTAL EVERY 4 HOURS PRN
Status: DISCONTINUED | OUTPATIENT
Start: 2021-01-06 | End: 2021-01-09 | Stop reason: HOSPADM

## 2021-01-06 RX ORDER — PROCHLORPERAZINE EDISYLATE 5 MG/ML
5-10 INJECTION INTRAMUSCULAR; INTRAVENOUS EVERY 4 HOURS PRN
Status: DISCONTINUED | OUTPATIENT
Start: 2021-01-06 | End: 2021-01-09 | Stop reason: HOSPADM

## 2021-01-06 RX ORDER — AMOXICILLIN 250 MG
2 CAPSULE ORAL DAILY
Status: DISCONTINUED | OUTPATIENT
Start: 2021-01-07 | End: 2021-01-06

## 2021-01-06 RX ORDER — HYDROXYCHLOROQUINE SULFATE 200 MG/1
200 TABLET, FILM COATED ORAL EVERY EVENING
Status: DISCONTINUED | OUTPATIENT
Start: 2021-01-07 | End: 2021-01-09 | Stop reason: HOSPADM

## 2021-01-06 RX ORDER — FUROSEMIDE 40 MG/1
40 TABLET ORAL
Status: ON HOLD | COMMUNITY
End: 2021-03-30

## 2021-01-06 RX ORDER — AMOXICILLIN 250 MG
2 CAPSULE ORAL 2 TIMES DAILY
Status: DISCONTINUED | OUTPATIENT
Start: 2021-01-06 | End: 2021-01-09 | Stop reason: HOSPADM

## 2021-01-06 RX ORDER — PREDNISONE 5 MG/1
5 TABLET ORAL EVERY EVENING
Status: DISCONTINUED | OUTPATIENT
Start: 2021-01-07 | End: 2021-01-09 | Stop reason: HOSPADM

## 2021-01-06 RX ORDER — ONDANSETRON 2 MG/ML
4 INJECTION INTRAMUSCULAR; INTRAVENOUS ONCE
Status: COMPLETED | OUTPATIENT
Start: 2021-01-06 | End: 2021-01-06

## 2021-01-06 RX ORDER — DIPHENHYDRAMINE HYDROCHLORIDE 50 MG/ML
25 INJECTION INTRAMUSCULAR; INTRAVENOUS ONCE
Status: COMPLETED | OUTPATIENT
Start: 2021-01-06 | End: 2021-01-07

## 2021-01-06 RX ORDER — MYCOPHENOLIC ACID 180 MG/1
180 TABLET, DELAYED RELEASE ORAL EVERY EVENING
Status: DISCONTINUED | OUTPATIENT
Start: 2021-01-07 | End: 2021-01-07

## 2021-01-06 RX ORDER — AMLODIPINE BESYLATE 5 MG/1
10 TABLET ORAL EVERY EVENING
Status: DISCONTINUED | OUTPATIENT
Start: 2021-01-07 | End: 2021-01-09 | Stop reason: HOSPADM

## 2021-01-06 RX ORDER — ACETAMINOPHEN 500 MG
1000 TABLET ORAL ONCE
Status: COMPLETED | OUTPATIENT
Start: 2021-01-06 | End: 2021-01-07

## 2021-01-06 RX ORDER — PROMETHAZINE HYDROCHLORIDE 25 MG/1
12.5-25 TABLET ORAL EVERY 4 HOURS PRN
Status: DISCONTINUED | OUTPATIENT
Start: 2021-01-06 | End: 2021-01-09 | Stop reason: HOSPADM

## 2021-01-06 RX ORDER — SODIUM CHLORIDE 9 MG/ML
INJECTION, SOLUTION INTRAVENOUS
Status: DISPENSED
Start: 2021-01-06 | End: 2021-01-07

## 2021-01-06 RX ORDER — ONDANSETRON 4 MG/1
4 TABLET, ORALLY DISINTEGRATING ORAL EVERY 4 HOURS PRN
Status: DISCONTINUED | OUTPATIENT
Start: 2021-01-06 | End: 2021-01-09 | Stop reason: HOSPADM

## 2021-01-06 RX ORDER — POLYETHYLENE GLYCOL 3350 17 G/17G
1 POWDER, FOR SOLUTION ORAL
Status: DISCONTINUED | OUTPATIENT
Start: 2021-01-06 | End: 2021-01-09 | Stop reason: HOSPADM

## 2021-01-06 RX ORDER — BISACODYL 10 MG
10 SUPPOSITORY, RECTAL RECTAL
Status: DISCONTINUED | OUTPATIENT
Start: 2021-01-06 | End: 2021-01-09 | Stop reason: HOSPADM

## 2021-01-06 RX ADMIN — ONDANSETRON 4 MG: 2 INJECTION INTRAMUSCULAR; INTRAVENOUS at 21:32

## 2021-01-06 RX ADMIN — MORPHINE SULFATE 4 MG: 4 INJECTION INTRAVENOUS at 22:53

## 2021-01-06 RX ADMIN — SODIUM CHLORIDE 8 MG/HR: 9 INJECTION, SOLUTION INTRAVENOUS at 23:15

## 2021-01-06 RX ADMIN — ONDANSETRON 4 MG: 2 INJECTION INTRAMUSCULAR; INTRAVENOUS at 22:52

## 2021-01-06 RX ADMIN — PANTOPRAZOLE SODIUM 80 MG: 40 INJECTION, POWDER, LYOPHILIZED, FOR SOLUTION INTRAVENOUS at 22:56

## 2021-01-06 ASSESSMENT — ENCOUNTER SYMPTOMS
FEVER: 0
COUGH: 0
BLOOD IN STOOL: 0
CONSTIPATION: 0
PHOTOPHOBIA: 0
DIARRHEA: 0
HEMOPTYSIS: 0
SEIZURES: 0
SPUTUM PRODUCTION: 0
SHORTNESS OF BREATH: 0
POLYDIPSIA: 0
ORTHOPNEA: 0
BLURRED VISION: 0
NAUSEA: 1
ABDOMINAL PAIN: 0
HALLUCINATIONS: 0
WEIGHT LOSS: 0
SORE THROAT: 0
BRUISES/BLEEDS EASILY: 0
NERVOUS/ANXIOUS: 0
TREMORS: 0
VOMITING: 0
PALPITATIONS: 0
FLANK PAIN: 0
HEADACHES: 0
CHILLS: 0
BACK PAIN: 0
DOUBLE VISION: 0
EYE REDNESS: 0
NECK PAIN: 0
WEAKNESS: 1
FOCAL WEAKNESS: 0
SPEECH CHANGE: 0
HEARTBURN: 0

## 2021-01-06 ASSESSMENT — LIFESTYLE VARIABLES: SUBSTANCE_ABUSE: 0

## 2021-01-06 ASSESSMENT — FIBROSIS 4 INDEX
FIB4 SCORE: 4.74
FIB4 SCORE: 5.59

## 2021-01-06 NOTE — PROGRESS NOTES
Patient presents for lab draw for blood transfusion Saturday 1/9/2021. Labs drawn from patient's left forearm using a 23 gauge butterfly needle, gauze and coban to site. Appointment confirmed and patient released in no acute distress. Lab called with critical result of hemoglobin at 4.6 and platelets 39. Called patient and instructed her to go to ED. Patient verbalizes understanding. Dr. Garcia notified of critical lab via volate. Dr. Garcia aware of labs and agrees with plan to send patient to ED.

## 2021-01-07 ENCOUNTER — ANESTHESIA EVENT (OUTPATIENT)
Dept: SURGERY | Facility: MEDICAL CENTER | Age: 26
DRG: 377 | End: 2021-01-07
Payer: COMMERCIAL

## 2021-01-07 PROBLEM — K92.1 GASTROINTESTINAL HEMORRHAGE WITH MELENA: Status: ACTIVE | Noted: 2021-01-07

## 2021-01-07 LAB
ALBUMIN SERPL BCP-MCNC: 2.3 G/DL (ref 3.2–4.9)
ALBUMIN/GLOB SERPL: 0.6 G/DL
ALP SERPL-CCNC: 73 U/L (ref 30–99)
ALT SERPL-CCNC: 28 U/L (ref 2–50)
ANION GAP SERPL CALC-SCNC: 6 MMOL/L (ref 7–16)
AST SERPL-CCNC: 67 U/L (ref 12–45)
BASOPHILS # BLD AUTO: 1.1 % (ref 0–1.8)
BASOPHILS # BLD: 0.04 K/UL (ref 0–0.12)
BILIRUB SERPL-MCNC: 0.4 MG/DL (ref 0.1–1.5)
BUN SERPL-MCNC: 31 MG/DL (ref 8–22)
CALCIUM SERPL-MCNC: 8.1 MG/DL (ref 8.4–10.2)
CHLORIDE SERPL-SCNC: 95 MMOL/L (ref 96–112)
CO2 SERPL-SCNC: 32 MMOL/L (ref 20–33)
CREAT SERPL-MCNC: 4.16 MG/DL (ref 0.5–1.4)
EOSINOPHIL # BLD AUTO: 0.01 K/UL (ref 0–0.51)
EOSINOPHIL NFR BLD: 0.3 % (ref 0–6.9)
ERYTHROCYTE [DISTWIDTH] IN BLOOD BY AUTOMATED COUNT: 54.4 FL (ref 35.9–50)
FERRITIN SERPL-MCNC: 367 NG/ML (ref 10–291)
FOLATE SERPL-MCNC: 5.7 NG/ML
GLOBULIN SER CALC-MCNC: 3.9 G/DL (ref 1.9–3.5)
GLUCOSE SERPL-MCNC: 86 MG/DL (ref 65–99)
HCT VFR BLD AUTO: 21.2 % (ref 37–47)
HCT VFR BLD AUTO: 22.1 % (ref 37–47)
HGB BLD-MCNC: 6.7 G/DL (ref 12–16)
HGB BLD-MCNC: 6.9 G/DL (ref 12–16)
HGB RETIC QN AUTO: 29.7 PG/CELL (ref 29–35)
IMM GRANULOCYTES # BLD AUTO: 0.02 K/UL (ref 0–0.11)
IMM GRANULOCYTES NFR BLD AUTO: 0.6 % (ref 0–0.9)
IMM RETICS NFR: 18.6 % (ref 9.3–17.4)
IRON SATN MFR SERPL: 24 % (ref 15–55)
IRON SERPL-MCNC: 39 UG/DL (ref 40–170)
LYMPHOCYTES # BLD AUTO: 0.76 K/UL (ref 1–4.8)
LYMPHOCYTES NFR BLD: 21.7 % (ref 22–41)
MCH RBC QN AUTO: 30.3 PG (ref 27–33)
MCHC RBC AUTO-ENTMCNC: 31.6 G/DL (ref 33.6–35)
MCV RBC AUTO: 95.9 FL (ref 81.4–97.8)
MONOCYTES # BLD AUTO: 0.37 K/UL (ref 0–0.85)
MONOCYTES NFR BLD AUTO: 10.5 % (ref 0–13.4)
NEUTROPHILS # BLD AUTO: 2.31 K/UL (ref 2–7.15)
NEUTROPHILS NFR BLD: 65.8 % (ref 44–72)
NRBC # BLD AUTO: 0 K/UL
NRBC BLD-RTO: 0 /100 WBC
PLATELET # BLD AUTO: 38 K/UL (ref 164–446)
PMV BLD AUTO: 12.9 FL (ref 9–12.9)
POTASSIUM SERPL-SCNC: 4.8 MMOL/L (ref 3.6–5.5)
PROT SERPL-MCNC: 6.2 G/DL (ref 6–8.2)
RBC # BLD AUTO: 2.21 M/UL (ref 4.2–5.4)
RETICS # AUTO: 0.12 M/UL (ref 0.04–0.06)
RETICS/RBC NFR: 5.6 % (ref 0.8–2.1)
SARS-COV-2 RNA RESP QL NAA+PROBE: NOTDETECTED
SODIUM SERPL-SCNC: 133 MMOL/L (ref 135–145)
SPECIMEN SOURCE: NORMAL
TIBC SERPL-MCNC: 163 UG/DL (ref 250–450)
UIBC SERPL-MCNC: 124 UG/DL (ref 110–370)
VIT B12 SERPL-MCNC: 333 PG/ML (ref 211–911)
WBC # BLD AUTO: 3.5 K/UL (ref 4.8–10.8)

## 2021-01-07 PROCEDURE — A9270 NON-COVERED ITEM OR SERVICE: HCPCS | Performed by: INTERNAL MEDICINE

## 2021-01-07 PROCEDURE — 30233N1 TRANSFUSION OF NONAUTOLOGOUS RED BLOOD CELLS INTO PERIPHERAL VEIN, PERCUTANEOUS APPROACH: ICD-10-PCS | Performed by: EMERGENCY MEDICINE

## 2021-01-07 PROCEDURE — 36415 COLL VENOUS BLD VENIPUNCTURE: CPT

## 2021-01-07 PROCEDURE — C9113 INJ PANTOPRAZOLE SODIUM, VIA: HCPCS | Performed by: INTERNAL MEDICINE

## 2021-01-07 PROCEDURE — A9270 NON-COVERED ITEM OR SERVICE: HCPCS | Performed by: EMERGENCY MEDICINE

## 2021-01-07 PROCEDURE — 700105 HCHG RX REV CODE 258: Performed by: EMERGENCY MEDICINE

## 2021-01-07 PROCEDURE — 85025 COMPLETE CBC W/AUTO DIFF WBC: CPT

## 2021-01-07 PROCEDURE — C9113 INJ PANTOPRAZOLE SODIUM, VIA: HCPCS

## 2021-01-07 PROCEDURE — 99233 SBSQ HOSP IP/OBS HIGH 50: CPT | Performed by: INTERNAL MEDICINE

## 2021-01-07 PROCEDURE — 770006 HCHG ROOM/CARE - MED/SURG/GYN SEMI*

## 2021-01-07 PROCEDURE — 85046 RETICYTE/HGB CONCENTRATE: CPT

## 2021-01-07 PROCEDURE — 700102 HCHG RX REV CODE 250 W/ 637 OVERRIDE(OP): Performed by: EMERGENCY MEDICINE

## 2021-01-07 PROCEDURE — 700101 HCHG RX REV CODE 250: Performed by: INTERNAL MEDICINE

## 2021-01-07 PROCEDURE — 700111 HCHG RX REV CODE 636 W/ 250 OVERRIDE (IP): Performed by: INTERNAL MEDICINE

## 2021-01-07 PROCEDURE — P9016 RBC LEUKOCYTES REDUCED: HCPCS | Mod: 91

## 2021-01-07 PROCEDURE — 36430 TRANSFUSION BLD/BLD COMPNT: CPT

## 2021-01-07 PROCEDURE — 85018 HEMOGLOBIN: CPT

## 2021-01-07 PROCEDURE — 700111 HCHG RX REV CODE 636 W/ 250 OVERRIDE (IP)

## 2021-01-07 PROCEDURE — 700102 HCHG RX REV CODE 250 W/ 637 OVERRIDE(OP): Performed by: INTERNAL MEDICINE

## 2021-01-07 PROCEDURE — 700111 HCHG RX REV CODE 636 W/ 250 OVERRIDE (IP): Performed by: EMERGENCY MEDICINE

## 2021-01-07 PROCEDURE — 85014 HEMATOCRIT: CPT

## 2021-01-07 PROCEDURE — 80053 COMPREHEN METABOLIC PANEL: CPT

## 2021-01-07 RX ORDER — OXYCODONE HYDROCHLORIDE AND ACETAMINOPHEN 5; 325 MG/1; MG/1
1-2 TABLET ORAL EVERY 6 HOURS PRN
Status: DISCONTINUED | OUTPATIENT
Start: 2021-01-07 | End: 2021-01-09 | Stop reason: HOSPADM

## 2021-01-07 RX ORDER — PANTOPRAZOLE SODIUM 40 MG/10ML
40 INJECTION, POWDER, LYOPHILIZED, FOR SOLUTION INTRAVENOUS 2 TIMES DAILY
Status: DISCONTINUED | OUTPATIENT
Start: 2021-01-07 | End: 2021-01-09 | Stop reason: HOSPADM

## 2021-01-07 RX ORDER — SODIUM CHLORIDE 9 MG/ML
0-250 INJECTION, SOLUTION INTRAVENOUS ONCE
Status: ACTIVE | OUTPATIENT
Start: 2021-01-07 | End: 2021-01-08

## 2021-01-07 RX ORDER — PANTOPRAZOLE SODIUM 40 MG/10ML
INJECTION, POWDER, LYOPHILIZED, FOR SOLUTION INTRAVENOUS
Status: COMPLETED
Start: 2021-01-07 | End: 2021-01-07

## 2021-01-07 RX ORDER — SODIUM CHLORIDE 9 MG/ML
250 INJECTION, SOLUTION INTRAVENOUS
Status: DISCONTINUED | OUTPATIENT
Start: 2021-01-07 | End: 2021-01-09 | Stop reason: HOSPADM

## 2021-01-07 RX ADMIN — PANTOPRAZOLE SODIUM 40 MG: 40 INJECTION, POWDER, LYOPHILIZED, FOR SOLUTION INTRAVENOUS at 23:41

## 2021-01-07 RX ADMIN — ACETAMINOPHEN 1000 MG: 500 TABLET, FILM COATED ORAL at 00:47

## 2021-01-07 RX ADMIN — ATENOLOL 25 MG: 25 TABLET ORAL at 17:10

## 2021-01-07 RX ADMIN — PANTOPRAZOLE SODIUM 40 MG: 40 INJECTION, POWDER, LYOPHILIZED, FOR SOLUTION INTRAVENOUS at 11:57

## 2021-01-07 RX ADMIN — POLYETHYLENE GLYCOL 3350, SODIUM SULFATE ANHYDROUS, SODIUM BICARBONATE, SODIUM CHLORIDE, POTASSIUM CHLORIDE 4 L: 236; 22.74; 6.74; 5.86; 2.97 POWDER, FOR SOLUTION ORAL at 21:59

## 2021-01-07 RX ADMIN — SODIUM CHLORIDE: 9 INJECTION, SOLUTION INTRAVENOUS at 00:47

## 2021-01-07 RX ADMIN — ONDANSETRON 4 MG: 2 INJECTION INTRAMUSCULAR; INTRAVENOUS at 21:59

## 2021-01-07 RX ADMIN — DIPHENHYDRAMINE HYDROCHLORIDE 25 MG: 50 INJECTION INTRAMUSCULAR; INTRAVENOUS at 00:46

## 2021-01-07 RX ADMIN — PROCHLORPERAZINE EDISYLATE 10 MG: 5 INJECTION INTRAMUSCULAR; INTRAVENOUS at 23:44

## 2021-01-07 RX ADMIN — ONDANSETRON 4 MG: 2 INJECTION INTRAMUSCULAR; INTRAVENOUS at 17:19

## 2021-01-07 RX ADMIN — AMLODIPINE BESYLATE 10 MG: 5 TABLET ORAL at 17:10

## 2021-01-07 RX ADMIN — OXYCODONE HYDROCHLORIDE AND ACETAMINOPHEN 2 TABLET: 5; 325 TABLET ORAL at 17:48

## 2021-01-07 RX ADMIN — HYDROXYCHLOROQUINE SULFATE 200 MG: 200 TABLET, FILM COATED ORAL at 17:10

## 2021-01-07 RX ADMIN — FUROSEMIDE 40 MG: 40 TABLET ORAL at 05:46

## 2021-01-07 ASSESSMENT — COGNITIVE AND FUNCTIONAL STATUS - GENERAL
SUGGESTED CMS G CODE MODIFIER MOBILITY: CH
DAILY ACTIVITIY SCORE: 24
SUGGESTED CMS G CODE MODIFIER DAILY ACTIVITY: CH
MOBILITY SCORE: 24

## 2021-01-07 ASSESSMENT — ENCOUNTER SYMPTOMS
BLOOD IN STOOL: 1
SHORTNESS OF BREATH: 1
SHORTNESS OF BREATH: 0
CARDIOVASCULAR NEGATIVE: 1
WEAKNESS: 1
BRUISES/BLEEDS EASILY: 1
PSYCHIATRIC NEGATIVE: 1
NEUROLOGICAL NEGATIVE: 1
RESPIRATORY NEGATIVE: 1
NAUSEA: 1
ABDOMINAL PAIN: 0
EYES NEGATIVE: 1
VOMITING: 1
FEVER: 0
MUSCULOSKELETAL NEGATIVE: 1
ROS GI COMMENTS: HEMATEMESIS

## 2021-01-07 ASSESSMENT — PAIN DESCRIPTION - PAIN TYPE
TYPE: ACUTE PAIN

## 2021-01-07 ASSESSMENT — FIBROSIS 4 INDEX: FIB4 SCORE: 4.28

## 2021-01-07 NOTE — ED NOTES
Medication reconciliation completed by interview with the patient. She does not recall the strength of furosemide that she is taking. Took her night doses of medications today.    Telma Trujillo, PharmD

## 2021-01-07 NOTE — ED NOTES
Pt rounding-in no apparent distress, talking on phone. For admit r/t transfusion. Med for nausea per request. Pt aware of plan

## 2021-01-07 NOTE — ED NOTES
Oxygen applied at 2L n/c for sats=88-92%, states improvement in previous discomfort after morphine

## 2021-01-07 NOTE — PROGRESS NOTES
Pt arrived via wheelchair, ambulated and admitted to room 215-1 from ICU at 1250. Pt is A&Ox4, denies any pain or nausea/vomiting at this time. Oriented to room call light and smoking policy. Reviewed plan of care with the patient. All questions answered. Bed locked. Bet at lowest position. Call light within reach.

## 2021-01-07 NOTE — ED NOTES
Report to icu and brain stringer. Pt for transfer to icu upon completion of protonix bolus and second iv

## 2021-01-07 NOTE — ED PROVIDER NOTES
ED Provider Note    CHIEF COMPLAINT  Chief Complaint   Patient presents with   • Abnormal Labs     Pt had transfusion scheduled for 9th, was called by transfusion center today and told to come to ED for to Hgb of 4.9. Pt denies lightheadedness, sob. Pt pale.        HPI  Lily Nicole is a 25 y.o. female with history of end-stage renal disease on dialysis, lupus, prior upper GI bleed who presents with abnormal labs and anemia.  The patient has been here multiple times for anemia and blood transfusions in the past.  She had dialysis today where they checked blood work and found her hemoglobin to be 4.9 therefore sent her here for further evaluation.  The patient states that she has been fatigued and nauseous however denies any other associated symptoms.  No recent fevers or infectious symptoms.  No significant chest pain or shortness of breath.  She denies any recent vomiting or blood in her stool.  No heavy vaginal bleeding from menstrual cycles.  Patient denies any current complaints of pain.  She did receive her full run of dialysis today.    REVIEW OF SYSTEMS  See HPI for further details.   Review of Systems   Constitutional: Positive for malaise/fatigue. Negative for chills and fever.   HENT: Negative for sore throat.    Eyes: Negative for blurred vision and redness.   Respiratory: Negative for cough and shortness of breath.    Cardiovascular: Negative for chest pain and leg swelling.   Gastrointestinal: Positive for nausea. Negative for abdominal pain and vomiting.   Genitourinary: Negative for dysuria and urgency.   Musculoskeletal: Negative for back pain and neck pain.   Skin: Negative for rash.   Neurological: Negative for focal weakness, seizures and headaches.   Psychiatric/Behavioral: Negative for suicidal ideas.         PAST MEDICAL HISTORY   has a past medical history of Anemia (01/17/2018), AVF (arteriovenous fistula) ([facility]), Dialysis patient ([facility]), ESRD (end stage renal disease) on dialysis ([facility])  "(01/17/2018), Heart burn, Hypertension (01/17/2018), Indigestion, Lupus (HCC), Migraines (01/17/2018), and Seizure (HCC) (2013).    SOCIAL HISTORY  Social History     Tobacco Use   • Smoking status: Never Smoker   • Smokeless tobacco: Never Used   Substance and Sexual Activity   • Alcohol use: No   • Drug use: No   • Sexual activity: Not on file       SURGICAL HISTORY   has a past surgical history that includes ronak by laparoscopy (4/5/2010); av fistula creation (Right); angioplasty (01/17/2018); other; other; gastroscopy-endo (12/9/2019); gastroscopy (N/A, 5/30/2020); gastroscopy-endo (9/18/2020); upper gi endoscopy,ctrl bleed (11/12/2020); upper gi endoscopy,biopsy (11/12/2020); and gastroscopy-endo (11/12/2020).    CURRENT MEDICATIONS  Home Medications     Reviewed by Telma Trujillo, PharmD (Pharmacist) on 01/06/21 at 2153  Med List Status: Complete   Medication Last Dose Status   acetaminophen (TYLENOL) 500 MG Tab 2 weeks ago Active   amLODIPine (NORVASC) 10 MG Tab 1/6/2021 Active   atenolol (TENORMIN) 25 MG Tab 1/6/2021 Active   Furosemide (LASIX PO) 1/6/2021 Active   hydroxychloroquine (PLAQUENIL) 200 MG Tab 1/6/2021 Active   mycophenolate (MYFORTIC) 180 MG EC tablet 1/6/2021 Active   omeprazole (PRILOSEC) 20 MG delayed-release capsule 1/6/2021 Active   ondansetron (ZOFRAN ODT) 4 MG TABLET DISPERSIBLE  Active   predniSONE (DELTASONE) 5 MG Tab 1/6/2021 Active   senna-docusate (PERICOLACE OR SENOKOT S) 8.6-50 MG Tab Not Taking Active                ALLERGIES  Allergies   Allergen Reactions   • Clindamycin Rash     Hive   • Keflex Rash     Hives   • Methylprednisolone      Anxious;   • Metoprolol Nausea       PHYSICAL EXAM   VITAL SIGNS: /69   Pulse (!) 114   Temp 37.3 °C (99.1 °F) (Temporal)   Resp (!) 26   Ht 1.702 m (5' 7\")   Wt 60.1 kg (132 lb 7.9 oz)   LMP 12/29/2020 (Exact Date)   SpO2 95%   Breastfeeding No   BMI 20.75 kg/m²      Physical Exam   Constitutional: She is oriented to " person, place, and time and well-developed, well-nourished, and in no distress. No distress.   Chronically ill-appearing young female   HENT:   Head: Normocephalic and atraumatic.   Eyes: Pupils are equal, round, and reactive to light. Conjunctivae are normal.   Neck: Normal range of motion. Neck supple.   Cardiovascular: Regular rhythm and normal heart sounds.   Tachycardic  Fistula in place in the right upper extremity   Pulmonary/Chest: Effort normal and breath sounds normal. No respiratory distress.   Abdominal: Soft. She exhibits no distension. There is no abdominal tenderness.   Musculoskeletal: Normal range of motion.         General: No tenderness or edema.   Neurological: She is alert and oriented to person, place, and time.   Moving all extremities spontaneously   Skin: Skin is warm and dry. There is pallor.   Psychiatric: Affect normal.         DIAGNOSTIC STUDIES        LABS  Personally reviewed by me  Labs Reviewed   COMP METABOLIC PANEL - Abnormal; Notable for the following components:       Result Value    Sodium 134 (*)     Chloride 91 (*)     Bun 25 (*)     Creatinine 3.35 (*)     Albumin 3.0 (*)     Globulin 4.6 (*)     All other components within normal limits    Narrative:     Indicate which anticoagulants the patient is on:->UNKNOWN   PROTHROMBIN TIME - Abnormal; Notable for the following components:    INR 1.16 (*)     All other components within normal limits    Narrative:     Indicate which anticoagulants the patient is on:->UNKNOWN   CBC WITH DIFFERENTIAL - Abnormal; Notable for the following components:    WBC 4.3 (*)     RBC 1.55 (*)     Hemoglobin 4.6 (*)     Hematocrit 15.8 (*)     .3 (*)     MCHC 28.7 (*)     RDW 59.9 (*)     Platelet Count 47 (*)     Neutrophils-Polys 72.30 (*)     Lymphocytes 16.90 (*)     Lymphs (Absolute) 0.72 (*)     All other components within normal limits    Narrative:     Indicate which anticoagulants the patient is on:->UNKNOWN   ESTIMATED GFR -  Abnormal; Notable for the following components:    GFR If  21 (*)     GFR If Non  17 (*)     All other components within normal limits    Narrative:     Indicate which anticoagulants the patient is on:->UNKNOWN   COD (ADULT)   APTT    Narrative:     Indicate which anticoagulants the patient is on:->UNKNOWN   HCG QUAL SERUM   COVID/SARS COV-2    Narrative:     Rule-out COVID-19 panel, includes droplet/contact/eye  isolation order.  Check influenza to order this test only if  specifically indicated.  Is patient being admitted?->Yes  Does this patient meet criteria for Rush/Cepheid per [facility]  Inpatient Workflow? (See workflow link below)->No  Expected turn around time?->Routine (In-House PCR up to 24  hours)  Have you been in close contact with a person who is suspected  or known to be positive for COVID-19 within the last 30 days  (e.g. last seen that person < 30 days ago)->Unknown   IRON/TOTAL IRON BIND   SARS-COV-2, PCR (IN-HOUSE)    Narrative:     Rule-out COVID-19 panel, includes droplet/contact/eye  isolation order.  Check influenza to order this test only if  specifically indicated.  Is patient being admitted?->Yes  Does this patient meet criteria for Rush/Cepheid per [facility]  Inpatient Workflow? (See workflow link below)->No  Expected turn around time?->Routine (In-House PCR up to 24  hours)  Have you been in close contact with a person who is suspected  or known to be positive for COVID-19 within the last 30 days  (e.g. last seen that person < 30 days ago)->Unknown   OCCULT BLOOD STOOL   VITAMIN B12   FOLATE   FERRITIN   RETICULOCYTES COUNT   TRANSFUSE RED BLOOD CELLS-NURSING COMMUNICATION (UNITS)   TRANSFUSE RED BLOOD CELLS-NURSING COMMUNICATION (UNITS)           RADIOLOGY  Personally reviewed by me  DX-CHEST-PORTABLE (1 VIEW)    (Results Pending)         ED COURSE  Vitals:    01/06/21 2026 01/06/21 2027 01/06/21 2057 01/06/21 2127   BP:  102/67 111/59 114/69   Pulse: (!) 102  (!) 103 (!) 110 (!) 114   Resp: (!) 29  (!) 32 (!) 26   Temp:       TempSrc:       SpO2: 95% 95% 96% 95%   Weight:       Height:             Medications administered:  PRBC, APAP, benadryl, zofran    Old records personally reviewed:  Patient has been seen here multiple times requiring blood transfusions.  She was here in the beginning of November for an upper GI bleed.  She had endoscopy at that time by Dr. Whitney that showed hemorrhagic gastritis that was treated with laser treatment.  No history of esophageal varices or liver disease.        MEDICAL DECISION MAKING  Young patient with unfortunately complicated medical history of end-stage renal disease on dialysis, lupus, and upper GI bleed who presents with worsening anemia per labs drawn at dialysis today.  She is afebrile, tachycardic on arrival with otherwise reassuring vital signs.  She really does not have any symptoms other than nausea and fatigue.  Initially gave no history of any active bleeding or blood loss.  Labs show a hemoglobin of 4.6 with associated platelets of 47.  No significant coagulopathy.  Patient's labs show underlying known kidney disease however no electrolyte abnormality.  Chest x-ray without signs of pneumonia, pneumothorax, pulmonary edema.  Patient will need admission and transfusion for severe anemia.    I discussed the case with Dr. Peñaloza, hospitalist on-call, accepts admission of the patient.  Patient was updated on plan of care for admission and agreeable at this time.    10:30 PM - I was informed by nursing staff that the patient had a large episode of dark maroon emesis concerning for possible recurrent GI bleed.  She continues to be tachycardic however is now hypertensive on arrival and uncomfortable appearing with epigastric pain.  Patient was immediately started on IV PPI.  I did discuss the case with Dr. Holterman with GI consultants who is familiar with the patient.  If the patient's vital signs remained stable he  recommends blood transfusion and continue treatment with PPI and they will evaluate the patient in the morning.    10:50 PM - I updated Dr. Styles who is the oncoming hospitalist with the change of course.  The patient will be upgraded to the ICU for closer monitoring.  She is still awaiting blood to be prepared due to history of multiple transfusion and antibodies.    CRITICAL CARE TIME  Upon my evaluation, this patient had a high probability of imminent or life-threatening deterioration due to severe anemia requiring blood transfusion, upper GI bleed which required my direct attention, intervention, and personal management.     I personally provided 40 minutes of total critical care time outside of time spent on separately billable/documented procedures. Time includes: review of laboratory data, review of radiology studies, discussion with consultants, discussion with family/patient, monitoring for potential decompensation.  Interventions were performed as documented above.         IMPRESSION  (D64.9) Anemia, unspecified type  (N18.6) ESRD (end stage renal disease) (HCC)  (K92.2) UGIB (upper gastrointestinal bleed)    Disposition: Admit medicine, critical condition  Results, diagnoses, and treatment options were discussed with the patient and/or family. Patient verbalized understanding of plan of care.    Patient referred to primary care provider for monitoring and treatment of blood pressure.      New Prescriptions    No medications on file           Electronically signed by: Edna Brewster M.D., 1/6/2021 9:39 PM

## 2021-01-07 NOTE — CONSULTS
Gastroenterology Initial Consult Note               Author:  ERICA Mcgrath Date & Time Created: 1/7/2021 11:34 AM       Patient ID:  Name:             Lily Abdi  YOB: 1995  Age:                 25 y.o.  female  MRN:               [id number]      Referring Provider:  Ivette Eckert MD      Presenting Chief Complaint:  Anemia, Hematemesis      History of Present Illness::    She is a 25-year-old female who is seen in consultation for anemia and hematemesis.  Yesterday the patient presented to the hospital after recommendations from a transfusion center to be seen with a hemoglobin outpatient at 4.9.  Upon evaluation of the emergency room she was found to have hemoglobin 4.6.  She complained of generalized weakness and nausea, but no abdominal pain.  During her time in the emergency room she vomited 150 cc of maroon blood.  She has had no further vomiting, melena, hematochezia since yesterday.    The patient has a significant past medical history including lupus with subsequent lupus nephritis resulting in end-stage renal disease on hemodialysis Monday, Wednesday, Friday, followed by Dr. Trent.  She is on the transplant list and sees [facility] for her transplant work-up.  She has received a work-up in the past from Dr. Garcia with hematology and has not been found to have any bleeding disorders or bone marrow issues.  She is well-known to our practice with repeated admissions for upper gastrointestinal bleeding related to oozing gastropathy caused by lupus and her kidney issues, the last in November with nonbleeding gastropathy, but several areas that looked high risk to begin to bleed..  She has received numerous endoscopies with ablation of bleeding areas in her stomach.  She has never had a colonoscopy.  She was having some diarrhea back in May of this year and states that she does have on and off diarrhea at times.  She does not notice bright red blood in her stool.     She  "received 2 units of blood and current hemoglobin 6.7.  She is hemodynamically stable.  She has experienced mild shortness of breath and has been screened for Covid, currently pending.      Review of Systems:  Review of Systems   Constitutional: Positive for malaise/fatigue.   HENT: Negative.    Eyes: Negative.    Respiratory: Positive for shortness of breath.    Cardiovascular: Negative.    Gastrointestinal: Positive for nausea and vomiting.        Hematemesis   Genitourinary: Negative.    Musculoskeletal: Negative.    Skin: Negative.    Neurological: Positive for weakness.   Endo/Heme/Allergies: Bruises/bleeds easily.   Psychiatric/Behavioral: Negative.    All other systems reviewed and are negative.            Past Medical History:  Past Medical History:   Diagnosis Date   • Anemia 01/17/2018   • AVF (arteriovenous fistula) ([facility])     Right Arm   • Dialysis patient ([facility])      dialysis, M,W,F Elizabeth/Joni   • ESRD (end stage renal disease) on dialysis ([facility]) 01/17/2018    Twan Fu   • Heart burn    • Hypertension 01/17/2018    \"Controlled with medication\"   • Indigestion    • Lupus ([facility])    • Migraines 01/17/2018   • Seizure ([facility]) 2013    from high blood pressure, reports 1 time event     Active Hospital Problems    Diagnosis   • Anemia, chronic disease [D63.8]     Priority: High   • Lupus ([facility]) [M32.9]     Priority: Medium   • ESRD (end stage renal disease) on dialysis ([facility]) [N18.6, Z99.2]     Priority: Medium         Past Surgical History:  Past Surgical History:   Procedure Laterality Date   • PB UPPER GI ENDOSCOPY,CTRL BLEED  11/12/2020    Procedure: GASTROSCOPY, WITH ARGON PLASMA COAGULATION;  Surgeon: Gadiel Whitney M.D.;  Location: [facility];  Service: Gastroenterology   • PB UPPER GI ENDOSCOPY,BIOPSY  11/12/2020    Procedure: GASTROSCOPY, WITH BIOPSY;  Surgeon: Gadiel Whitney M.D.;  Location: SURGERY [facility];  Service: Gastroenterology   • GASTROSCOPY-ENDO  11/12/2020    Procedure: " "GASTROSCOPY;  Surgeon: Gadiel Whitney M.D.;  Location: SURGERY [facility];  Service: Gastroenterology   • GASTROSCOPY-ENDO  9/18/2020    Procedure: GASTROSCOPY;  Surgeon: Gadiel Whitney M.D.;  Location: SURGERY [facility];  Service: Gastroenterology   • GASTROSCOPY N/A 5/30/2020    Procedure: GASTROSCOPY;  Surgeon: Waylon Mcqueen M.D.;  Location: SURGERY [facility];  Service: Gastroenterology   • GASTROSCOPY-ENDO  12/9/2019    Procedure: GASTROSCOPY;  Surgeon: Aaron Kam M.D.;  Location: [facility];  Service: Gastroenterology   • ANGIOPLASTY  01/17/2018    \"Right Arm AV-Fistulagram & Angioplastyx3\"   • ULI BY LAPAROSCOPY  4/5/2010    Performed by SYED MARTELL at SURGERY [facility]   • AV FISTULA CREATION Right    • OTHER      renal biopsy x 3   • OTHER      bone marrow biopsy         Prior Endoscopic Procedures:  [facility] Medications:  Current Facility-Administered Medications   Medication Dose Frequency Provider Last Rate Last Admin   • PANTOPRAZOLE SODIUM 40 MG IV SOLR           • pantoprazole (PROTONIX) injection 40 mg  40 mg BID Ivette Eckert M.D.       • senna-docusate (PERICOLACE or SENOKOT S) 8.6-50 MG per tablet 2 Tab  2 Tab BID Pepe Peñaloza M.D.        And   • polyethylene glycol/lytes (MIRALAX) PACKET 1 Packet  1 Packet QDAY PRN Pepe Peñaloza M.D.        And   • magnesium hydroxide (MILK OF MAGNESIA) suspension 30 mL  30 mL QDAY PRN Pepe Peñaloza M.D.        And   • bisacodyl (DULCOLAX) suppository 10 mg  10 mg QDAY PRN Pepe Peñaloza M.D.       • ondansetron (ZOFRAN) syringe/vial injection 4 mg  4 mg Q4HRS PRN Pepe Peñaloza M.D.       • ondansetron (ZOFRAN ODT) dispertab 4 mg  4 mg Q4HRS PRN Pepe Peñaloza M.D.       • promethazine (PHENERGAN) tablet 12.5-25 mg  12.5-25 mg Q4HRS PRN Pepe Peñaloza M.D.       • promethazine (PHENERGAN) suppository 12.5-25 mg  12.5-25 mg Q4HRS PRN Pepe Peñaloza M.D.       • " prochlorperazine (COMPAZINE) injection 5-10 mg  5-10 mg Q4HRS PRN Pepe Peñaloza M.D.       • amLODIPine (NORVASC) tablet 10 mg  10 mg Q EVENING Pepe Peñaloza M.D.       • atenolol (TENORMIN) tablet 25 mg  25 mg Q EVENING Pepe Peñaloza M.D.       • hydroxychloroquine (Plaquenil) tablet 200 mg  200 mg Q EVENING Pepe Peñaloza M.D.       • furosemide (LASIX) tablet 40 mg  40 mg DAILY Pepe Peñaloza M.D.   40 mg at 01/07/21 0546   • mycophenolate (MYFORTIC) TBEC 180 mg  180 mg Q EVENING Pepe Peñaloza M.D.       • predniSONE (DELTASONE) tablet 5 mg  5 mg Q EVENING Pepe Peñaloza M.D.       Last reviewed on 1/6/2021  9:53 PM by Telma Trujillo, PharmD        Current Outpatient Medications:  Medications Prior to Admission   Medication Sig Dispense Refill Last Dose   • Furosemide (LASIX PO) Take  by mouth every day.   1/6/2021 at AM   • predniSONE (DELTASONE) 5 MG Tab Take 5 mg by mouth every evening.   1/6/2021 at HS   • omeprazole (PRILOSEC) 20 MG delayed-release capsule Take 1 Cap by mouth 2 times a day. 60 Cap 0 1/6/2021 at HS   • senna-docusate (PERICOLACE OR SENOKOT S) 8.6-50 MG Tab Take 2 Tabs by mouth every day. (Patient not taking: Reported on 1/6/2021) 30 Tab 0 Not Taking at Unknown time   • ondansetron (ZOFRAN ODT) 4 MG TABLET DISPERSIBLE Take 1 Tab by mouth every four hours as needed for Nausea. (Patient not taking: Reported on 12/2/2020) 10 Tab 0    • atenolol (TENORMIN) 25 MG Tab Take 25 mg by mouth every evening.   1/6/2021 at HS   • mycophenolate (MYFORTIC) 180 MG EC tablet Take 1 Tab by mouth every evening. 30 Tab 1 1/6/2021 at HS   • acetaminophen (TYLENOL) 500 MG Tab Take 500 mg by mouth every 6 hours as needed for Moderate Pain.   2 weeks ago at PRN   • amLODIPine (NORVASC) 10 MG Tab Take 10 mg by mouth every evening.   1/6/2021 at HS   • hydroxychloroquine (PLAQUENIL) 200 MG Tab Take 200 mg by mouth every evening.   1/6/2021 at HS         Medication Allergies:  Allergies  "  Allergen Reactions   • Clindamycin Rash     Hive   • Keflex Rash     Hives   • Methylprednisolone      Anxious;   • Metoprolol Nausea         Family Medical History:  Family History   Problem Relation Age of Onset   • Diabetes Paternal Grandmother          Social History:  Social History     Socioeconomic History   • Marital status: Single     Spouse name: Not on file   • Number of children: Not on file   • Years of education: Not on file   • Highest education level: Not on file   Occupational History   • Not on file   Social Needs   • Financial resource strain: Not on file   • Food insecurity     Worry: Never true     Inability: Never true   • Transportation needs     Medical: No     Non-medical: No   Tobacco Use   • Smoking status: Never Smoker   • Smokeless tobacco: Never Used   Substance and Sexual Activity   • Alcohol use: No   • Drug use: No   • Sexual activity: Not on file   Lifestyle   • Physical activity     Days per week: Not on file     Minutes per session: Not on file   • Stress: Not on file   Relationships   • Social connections     Talks on phone: Not on file     Gets together: Not on file     Attends Zoroastrianism service: Not on file     Active member of club or organization: Not on file     Attends meetings of clubs or organizations: Not on file     Relationship status: Not on file   • Intimate partner violence     Fear of current or ex partner: Not on file     Emotionally abused: Not on file     Physically abused: Not on file     Forced sexual activity: Not on file   Other Topics Concern   • Not on file   Social History Narrative   • Not on file         Vital signs:  Weight/BMI: Body mass index is 20.54 kg/m².  /81   Pulse 64   Temp 36.4 °C (97.6 °F) (Temporal)   Resp 15   Ht 1.702 m (5' 7\")   Wt 59.5 kg (131 lb 2.8 oz)   SpO2 95%   Vitals:    01/07/21 0445 01/07/21 0500 01/07/21 0515 01/07/21 0726   BP: 151/63 146/59 144/81    Pulse: 62 62 74 64   Resp: 16 12 17 15   Temp:    36.4 °C " (97.6 °F)   TempSrc:    Temporal   SpO2: 100% 100% 95%    Weight:       Height:         Oxygen Therapy:  Pulse Oximetry: 95 %, O2 (LPM): 2, O2 Delivery Device: Silicone Nasal Cannula    Intake/Output Summary (Last 24 hours) at 1/7/2021 1134  Last data filed at 1/7/2021 0430  Gross per 24 hour   Intake 700 ml   Output --   Net 700 ml         Physical Exam:  Physical Exam  Vitals signs and nursing note reviewed.   Constitutional:       Appearance: Normal appearance.   HENT:      Head: Normocephalic and atraumatic.      Right Ear: External ear normal.      Left Ear: External ear normal.      Nose: Nose normal.      Mouth/Throat:      Mouth: Mucous membranes are moist.      Pharynx: Oropharynx is clear.   Eyes:      Extraocular Movements: Extraocular movements intact.      Conjunctiva/sclera: Conjunctivae normal.      Pupils: Pupils are equal, round, and reactive to light.   Neck:      Musculoskeletal: Normal range of motion and neck supple.   Cardiovascular:      Rate and Rhythm: Normal rate and regular rhythm.      Pulses: Normal pulses.      Heart sounds: Normal heart sounds.   Pulmonary:      Effort: Pulmonary effort is normal.      Breath sounds: Normal breath sounds.   Abdominal:      General: Abdomen is flat. Bowel sounds are normal.      Palpations: Abdomen is soft.   Neurological:      Mental Status: She is alert.           Labs:  Recent Labs     01/06/21  1950 01/07/21  0500   SODIUM 134* 133*   POTASSIUM 4.4 4.8   CHLORIDE 91* 95*   CO2 32 32   BUN 25* 31*   CREATININE 3.35* 4.16*   CALCIUM 8.6 8.1*     Recent Labs     01/06/21  1948 01/07/21  0500   ALTSGPT 11 28   ASTSGOT 29 67*   ALKPHOSPHAT 70 73   TBILIRUBIN 0.3 0.4   GLUCOSE 96 86     Recent Labs     01/06/21  1453 01/06/21  1950 01/07/21  0500   WBC 4.4* 4.3* 3.5*   NEUTSPOLYS 78.80* 72.30* 65.80   LYMPHOCYTES 13.20* 16.90* 21.70*   MONOCYTES 5.90 8.90 10.50   EOSINOPHILS 0.70 0.20 0.30   BASOPHILS 0.70 1.20 1.10   ASTSGOT  --  29 67*   ALTSGPT  --  11  28   ALKPHOSPHAT  --  70 73   TBILIRUBIN  --  0.3 0.4     Recent Labs     01/06/21  1453 01/06/21  1950 01/07/21  0500   RBC 1.57* 1.55* 2.21*   HEMOGLOBIN 4.6* 4.6* 6.7*   HEMATOCRIT 15.7* 15.8* 21.2*   PLATELETCT 39* 47* 38*   PROTHROMBTM  --  14.5  --    APTT  --  28.9  --    INR  --  1.16*  --    IRON  --  39*  --    FERRITIN  --  367.0*  --    TOTIRONBC  --  163*  --      Recent Results (from the past 24 hour(s))   CBC WITH DIFFERENTIAL    Collection Time: 01/06/21  2:53 PM   Result Value Ref Range    WBC 4.4 (L) 4.8 - 10.8 K/uL    RBC 1.57 (L) 4.20 - 5.40 M/uL    Hemoglobin 4.6 (LL) 12.0 - 16.0 g/dL    Hematocrit 15.7 (LL) 37.0 - 47.0 %    .0 (H) 81.4 - 97.8 fL    MCH 28.7 27.0 - 33.0 pg    MCHC 28.7 (L) 33.6 - 35.0 g/dL    RDW 59.8 (H) 35.9 - 50.0 fL    Platelet Count 39 (LL) 164 - 446 K/uL    MPV 13.6 (H) 9.0 - 12.9 fL    Neutrophils-Polys 78.80 (H) 44.00 - 72.00 %    Lymphocytes 13.20 (L) 22.00 - 41.00 %    Monocytes 5.90 0.00 - 13.40 %    Eosinophils 0.70 0.00 - 6.90 %    Basophils 0.70 0.00 - 1.80 %    Immature Granulocytes 0.70 0.00 - 0.90 %    Nucleated RBC 0.00 /100 WBC    Neutrophils (Absolute) 3.46 2.00 - 7.15 K/uL    Lymphs (Absolute) 0.58 (L) 1.00 - 4.80 K/uL    Monos (Absolute) 0.26 0.00 - 0.85 K/uL    Eos (Absolute) 0.03 0.00 - 0.51 K/uL    Baso (Absolute) 0.03 0.00 - 0.12 K/uL    Immature Granulocytes (abs) 0.03 0.00 - 0.11 K/uL    NRBC (Absolute) 0.00 K/uL    Hypochromia 2+ (A)     Anisocytosis 1+     Macrocytosis 1+    PERIPHERAL SMEAR REVIEW    Collection Time: 01/06/21  2:53 PM   Result Value Ref Range    Peripheral Smear Review see below    PLATELET ESTIMATE    Collection Time: 01/06/21  2:53 PM   Result Value Ref Range    Plt Estimation Marked Decrease    MORPHOLOGY    Collection Time: 01/06/21  2:53 PM   Result Value Ref Range    RBC Morphology Present     Polychromia 1+    IMMATURE PLT FRACTION    Collection Time: 01/06/21  2:53 PM   Result Value Ref Range    Imm. Plt Fraction  13.7 (H) 0.6 - 13.1 K/uL   DIFFERENTIAL COMMENT    Collection Time: 01/06/21  2:53 PM   Result Value Ref Range    Comments-Diff see below    COMP METABOLIC PANEL    Collection Time: 01/06/21  7:50 PM   Result Value Ref Range    Sodium 134 (L) 135 - 145 mmol/L    Potassium 4.4 3.6 - 5.5 mmol/L    Chloride 91 (L) 96 - 112 mmol/L    Co2 32 20 - 33 mmol/L    Anion Gap 11.0 7.0 - 16.0    Glucose 96 65 - 99 mg/dL    Bun 25 (H) 8 - 22 mg/dL    Creatinine 3.35 (H) 0.50 - 1.40 mg/dL    Calcium 8.6 8.4 - 10.2 mg/dL    AST(SGOT) 29 12 - 45 U/L    ALT(SGPT) 11 2 - 50 U/L    Alkaline Phosphatase 70 30 - 99 U/L    Total Bilirubin 0.3 0.1 - 1.5 mg/dL    Albumin 3.0 (L) 3.2 - 4.9 g/dL    Total Protein 7.6 6.0 - 8.2 g/dL    Globulin 4.6 (H) 1.9 - 3.5 g/dL    A-G Ratio 0.7 g/dL   APTT    Collection Time: 01/06/21  7:50 PM   Result Value Ref Range    APTT 28.9 24.7 - 36.0 sec   PROTHROMBIN TIME (INR)    Collection Time: 01/06/21  7:50 PM   Result Value Ref Range    PT 14.5 12.0 - 14.6 sec    INR 1.16 (H) 0.87 - 1.13   CBC WITH DIFFERENTIAL    Collection Time: 01/06/21  7:50 PM   Result Value Ref Range    WBC 4.3 (L) 4.8 - 10.8 K/uL    RBC 1.55 (L) 4.20 - 5.40 M/uL    Hemoglobin 4.6 (LL) 12.0 - 16.0 g/dL    Hematocrit 15.8 (LL) 37.0 - 47.0 %    .3 (H) 81.4 - 97.8 fL    MCH 29.0 27.0 - 33.0 pg    MCHC 28.7 (L) 33.6 - 35.0 g/dL    RDW 59.9 (H) 35.9 - 50.0 fL    Platelet Count 47 (LL) 164 - 446 K/uL    MPV 12.7 9.0 - 12.9 fL    Neutrophils-Polys 72.30 (H) 44.00 - 72.00 %    Lymphocytes 16.90 (L) 22.00 - 41.00 %    Monocytes 8.90 0.00 - 13.40 %    Eosinophils 0.20 0.00 - 6.90 %    Basophils 1.20 0.00 - 1.80 %    Immature Granulocytes 0.50 0.00 - 0.90 %    Nucleated RBC 0.00 /100 WBC    Neutrophils (Absolute) 3.09 2.00 - 7.15 K/uL    Lymphs (Absolute) 0.72 (L) 1.00 - 4.80 K/uL    Monos (Absolute) 0.38 0.00 - 0.85 K/uL    Eos (Absolute) 0.01 0.00 - 0.51 K/uL    Baso (Absolute) 0.05 0.00 - 0.12 K/uL    Immature Granulocytes (abs) 0.02  0.00 - 0.11 K/uL    NRBC (Absolute) 0.00 K/uL   HCG QUAL SERUM    Collection Time: 01/06/21  7:50 PM   Result Value Ref Range    Beta-Hcg Qualitative Serum Negative Negative   ESTIMATED GFR    Collection Time: 01/06/21  7:50 PM   Result Value Ref Range    GFR If  21 (A) >60 mL/min/1.73 m 2    GFR If Non African American 17 (A) >60 mL/min/1.73 m 2   IRON/TOTAL IRON BIND    Collection Time: 01/06/21  7:50 PM   Result Value Ref Range    Iron 39 (L) 40 - 170 ug/dL    Total Iron Binding 163 (L) 250 - 450 ug/dL    Unsat Iron Binding 124 110 - 370 ug/dL    % Saturation 24 15 - 55 %   VITAMIN B12    Collection Time: 01/06/21  7:50 PM   Result Value Ref Range    Vitamin B12 -True Cobalamin 333 211 - 911 pg/mL   FOLATE    Collection Time: 01/06/21  7:50 PM   Result Value Ref Range    Folate -Folic Acid 5.7 >4.0 ng/mL   FERRITIN    Collection Time: 01/06/21  7:50 PM   Result Value Ref Range    Ferritin 367.0 (H) 10.0 - 291.0 ng/mL   COD (ADULT)    Collection Time: 01/06/21  7:58 PM   Result Value Ref Range    ABO Grouping Only O     Rh Grouping Only POS     Antibody Screen-Cod NEG     Component R       R99                 Red Cells, LR       [id number]   selected     01/06/21   21:41      Product Type R99     Dispense Status selected     Unit Number (Barcoded) [id number]     Product Code (Barcoded) W9918L20     Blood Type (Barcoded) 5100     Component R       R99                 Red Cells, LR       [id number]   issued       01/07/21   02:08      Product Type R99     Dispense Status issued     Unit Number (Barcoded) [id number]     Product Code (Barcoded) I7027F19     Blood Type (Barcoded) 5100     Component R       R99                 Red Cells, LR       [id number]   issued       01/07/21   00:37      Product Type R99     Dispense Status issued     Unit Number (Barcoded) [id number]     Product Code (Barcoded) R0531T68     Blood Type (Barcoded) 5100     Component R       R99                 Red  Cells, LR       [id number]   selected     21   21:41      Product Type R99     Dispense Status selected     Unit Number (Barcoded) [id number]     Product Code (Barcoded) B6845T18     Blood Type (Barcoded) 5100    COVID/SARS CoV-2 PCR    Collection Time: 21 10:11 PM    Specimen: Nasopharyngeal; Respirate   Result Value Ref Range    COVID Order Status Received    SARS-CoV-2, PCR (In-House)    Collection Time: 21 10:11 PM   Result Value Ref Range    SARS-CoV-2 Source NP Swab    EKG (Now)    Collection Time: 21 10:58 PM   Result Value Ref Range    Report       [facility]    Test Date:  2021  Pt Name:    CASE WOODSON            Department: [facility]  MRN:        [id number]                      Room:       [facility]  Gender:     Female                       Technician:   :        1995                   Requested By:ILIANA CAMPBELL  Order #:    [id number]                    Reading MD:    Measurements  Intervals                                Axis  Rate:       103                          P:          59  TN:         152                          QRS:        7  QRSD:       86                           T:          102  QT:         372  QTc:        487    Interpretive Statements  SINUS TACHYCARDIA  CONSIDER LEFT VENTRICULAR HYPERTROPHY  BORDERLINE PROLONGED QT INTERVAL  BASELINE WANDER IN LEAD(S) V1,V2  Compared to ECG 2020 23:07:53  Sinus rhythm no longer present     CBC with Differential    Collection Time: 21  5:00 AM   Result Value Ref Range    WBC 3.5 (L) 4.8 - 10.8 K/uL    RBC 2.21 (L) 4.20 - 5.40 M/uL    Hemoglobin 6.7 (L) 12.0 - 16.0 g/dL    Hematocrit 21.2 (L) 37.0 - 47.0 %    MCV 95.9 81.4 - 97.8 fL    MCH 30.3 27.0 - 33.0 pg    MCHC 31.6 (L) 33.6 - 35.0 g/dL    RDW 54.4 (H) 35.9 - 50.0 fL    Platelet Count 38 (LL) 164 - 446 K/uL    MPV 12.9 9.0 - 12.9 fL    Neutrophils-Polys 65.80 44.00 - 72.00 %    Lymphocytes 21.70 (L) 22.00  - 41.00 %    Monocytes 10.50 0.00 - 13.40 %    Eosinophils 0.30 0.00 - 6.90 %    Basophils 1.10 0.00 - 1.80 %    Immature Granulocytes 0.60 0.00 - 0.90 %    Nucleated RBC 0.00 /100 WBC    Neutrophils (Absolute) 2.31 2.00 - 7.15 K/uL    Lymphs (Absolute) 0.76 (L) 1.00 - 4.80 K/uL    Monos (Absolute) 0.37 0.00 - 0.85 K/uL    Eos (Absolute) 0.01 0.00 - 0.51 K/uL    Baso (Absolute) 0.04 0.00 - 0.12 K/uL    Immature Granulocytes (abs) 0.02 0.00 - 0.11 K/uL    NRBC (Absolute) 0.00 K/uL   Comp Metabolic Panel (CMP)    Collection Time: 01/07/21  5:00 AM   Result Value Ref Range    Sodium 133 (L) 135 - 145 mmol/L    Potassium 4.8 3.6 - 5.5 mmol/L    Chloride 95 (L) 96 - 112 mmol/L    Co2 32 20 - 33 mmol/L    Anion Gap 6.0 (L) 7.0 - 16.0    Glucose 86 65 - 99 mg/dL    Bun 31 (H) 8 - 22 mg/dL    Creatinine 4.16 (H) 0.50 - 1.40 mg/dL    Calcium 8.1 (L) 8.4 - 10.2 mg/dL    AST(SGOT) 67 (H) 12 - 45 U/L    ALT(SGPT) 28 2 - 50 U/L    Alkaline Phosphatase 73 30 - 99 U/L    Total Bilirubin 0.4 0.1 - 1.5 mg/dL    Albumin 2.3 (L) 3.2 - 4.9 g/dL    Total Protein 6.2 6.0 - 8.2 g/dL    Globulin 3.9 (H) 1.9 - 3.5 g/dL    A-G Ratio 0.6 g/dL   RETICULOCYTES COUNT    Collection Time: 01/07/21  5:00 AM   Result Value Ref Range    Reticulocyte Count 5.6 (H) 0.8 - 2.1 %    Retic, Absolute 0.12 (H) 0.04 - 0.06 M/uL    Imm. Reticulocyte Fraction 18.6 (H) 9.3 - 17.4 %    Retic Hgb Equivalent 29.7 29.0 - 35.0 pg/cell   ESTIMATED GFR    Collection Time: 01/07/21  5:00 AM   Result Value Ref Range    GFR If  16 (A) >60 mL/min/1.73 m 2    GFR If Non  13 (A) >60 mL/min/1.73 m 2         Radiology Review:  DX-CHEST-PORTABLE (1 VIEW)   Final Result      Resolved consolidation with stable cardiac silhouette enlargement            MDM (Data Review):   -Records reviewed and summarized in current documentation  -I personally reviewed and interpreted the laboratory results  -I personally reviewed the radiology images      Medical  Decision Making, by Problem:  Active Hospital Problems    Diagnosis   • Anemia, chronic disease [D63.8]     Priority: High   • Lupus (HCC) [M32.9]     Priority: Medium   • ESRD (end stage renal disease) on dialysis (HCC) [N18.6, Z99.2]     Priority: Medium     25-year-old female with evidence of profound anemia, one episode hematemesis last night  She has received multiple endoscopic evaluation in the past with multiple ablations, but this does not seem to give her much improvement long-term in reducing admissions with anemia, fatigue, and sometimes GI bleeding.  Suspect she has upper gastrointestinal bleeding this time around, however has not had further evaluation for other abnormalities in her lower gastrointestinal tract despite previously being recommended to have a colonoscopy as well.       Assessment/Recommendations:  Problems:  1.  Acute on chronic anemia-secondary to kidney disease and likely some level of gastrointestinal bleeding.  With hematemesis yesterday, likely upper gastrointestinal bleeding.    2.  Hematemesis    3.  End-stage renal disease    4.  Lupus and resultant lupus nephritis    Plan:  1.  Esophagogastroduodenoscopy and colonoscopy with Dr. Herbert palacio tomorrow 2 PM. Risks, benefits, and alternatives of aforementioned procedures were discussed with patient and/or family member(s).  Consenting person(s) were given opportunities to ask questions and discuss other options.  Risks including but not limited to perforation, infection, bleeding, missed lesion(s), possible need for surgery(ies) and/or interventional radiology, possible need for repeat procedure(s) and/or additional testing, hospitalization possibly prolonged, cardiac and/or pulmonary event, aspiration, hypoxia, stroke, medication and/or anesthesia reaction, indefinite diagnosis, discomfort, unsuccessful and/or incomplete procedure, ineffective therapy and/or persistent symptoms, damage to adjacent organs and/or vascular structures,  and other adverse events possibly life-threatening.  Interactive discussion was undertaken with Layman's terms. I answered questions in full and to satisfaction.  Consenting person(s) stated understanding and acceptance of these risks, and wished to proceed.  Informed consent was given in clear state of mind.    2.  Clear liquid diet today, n.p.o. after midnight    3.  GoLYTELY prep this evening    4.  Serial hemoglobin and hematocrit, transfuse for hemoglobin less than 7    5.  Protonix 40 mg IV twice daily    Thank you very much for allowing me to participate in the care of your patient.  Please feel free to contact me anytime at [phone].     ERICA Mcgrath    Core Quality Measures   Reviewed items::  Labs, Medications and Radiology reports reviewed

## 2021-01-07 NOTE — ED NOTES
Saline lock with blood draw with difficulty. Pt aware of pending transfusion. Per blood bank, pt difficult to cross match due to presence of antibodies. Will update erp to same

## 2021-01-07 NOTE — ED NOTES
Bella from Lab called with critical result of H/H of 4.6/15.1 and platelets of 47 at 2020. Critical lab result read back to Bella.   Dr. Brewster notified of critical lab result at 2021.  Critical lab result read back by Dr. Brewster.

## 2021-01-07 NOTE — ED NOTES
Called to room Pt c/o  Epigastric pain and vomited 150 cc maroon blood ERP aware Primary RN-Lorri Ascencio

## 2021-01-07 NOTE — ED NOTES
Pt reminded to call staff prior to getting oob. Med per order for discomfort and ugi bleed. Aware of room chg to icu, vs as charted, ekg in process

## 2021-01-07 NOTE — PROGRESS NOTES
Pulmonary Progress Note    Date of admission  1/6/2021    Chief Complaint  25 y.o. female admitted 1/6/2021 with anemia    Hospital Course  26 yo female with SLE, ESRD on transplant list at [facility] and HD M/W/F presented with weakness and referral for hbg of 4.6  Given 2 units and up to 6.7  Pt did have some melena and coffee ground emesis - previous EGD in 11/2020 showed hemorrhagic gastritis. Plan for EGD and Colonoscopy tomorrow.     Interval Problem Update  Reviewed last 24 hour events:  As above    Review of Systems  Review of Systems   Constitutional: Positive for malaise/fatigue.   HENT: Negative.    Eyes: Negative.    Respiratory: Negative.    Cardiovascular: Negative.    Gastrointestinal: Positive for melena and vomiting.   Genitourinary: Negative.    Musculoskeletal: Negative.    Skin: Negative.    Neurological: Negative.    Endo/Heme/Allergies: Negative.    Psychiatric/Behavioral: Negative.         Vital Signs for last 24 hours   Temp:  [36.3 °C (97.3 °F)-37.3 °C (99.1 °F)] 36.3 °C (97.3 °F)  Pulse:  [] 88  Resp:  [8-32] 18  BP: (102-164)/() 159/111  SpO2:  [93 %-100 %] 100 %           Physical Exam   Physical Exam  Constitutional:       Appearance: Normal appearance.   HENT:      Head: Normocephalic and atraumatic.      Mouth/Throat:      Mouth: Mucous membranes are dry.   Eyes:      Extraocular Movements: Extraocular movements intact.      Pupils: Pupils are equal, round, and reactive to light.   Cardiovascular:      Rate and Rhythm: Normal rate.   Pulmonary:      Effort: Pulmonary effort is normal.      Breath sounds: Normal breath sounds.   Abdominal:      General: Abdomen is flat. Bowel sounds are normal.   Musculoskeletal: Normal range of motion.   Neurological:      Mental Status: She is alert.   Psychiatric:         Mood and Affect: Mood normal.         Behavior: Behavior normal.         Thought Content: Thought content normal.         Judgment: Judgment normal.          Medications  Current Facility-Administered Medications   Medication Dose Route Frequency Provider Last Rate Last Admin   • pantoprazole (PROTONIX) injection 40 mg  40 mg Intravenous BID Ivette Eckert M.D.   40 mg at 01/07/21 1157   • NS (BOLUS) infusion 250 mL  250 mL Intravenous DIALYSIS PRN Navin Metz M.D.       • lidocaine (XYLOCAINE) 1 % injection 1 mL  1 mL Intradermal PRN Navin Metz M.D.       • NS (BOLUS) infusion 0-250 mL  0-250 mL Intravenous Once Navin Metz M.D.       • [START ON 1/8/2021] epoetin (Retacrit) injection (Dialysis use only) 12,000 Units  12,000 Units Intravenous MO, WE + FR Ivette Eckert M.D.       • oxyCODONE-acetaminophen (PERCOCET) 5-325 MG per tablet 1-2 Tab  1-2 Tab Oral Q6HRS PRN Ivette Eckert M.D.   2 Tab at 01/07/21 1748   • senna-docusate (PERICOLACE or SENOKOT S) 8.6-50 MG per tablet 2 Tab  2 Tab Oral BID Pepe Peñaloza M.D.        And   • polyethylene glycol/lytes (MIRALAX) PACKET 1 Packet  1 Packet Oral QDAY PRN Pepe Peñaloza M.D.        And   • magnesium hydroxide (MILK OF MAGNESIA) suspension 30 mL  30 mL Oral QDAY PRN Pepe Peñaloza M.D.        And   • bisacodyl (DULCOLAX) suppository 10 mg  10 mg Rectal QDAY PRN Pepe Peñaloza M.D.       • ondansetron (ZOFRAN) syringe/vial injection 4 mg  4 mg Intravenous Q4HRS PRN Pepe Peñaloza M.D.   4 mg at 01/07/21 1719   • ondansetron (ZOFRAN ODT) dispertab 4 mg  4 mg Oral Q4HRS PRN Pepe Peñaloza M.D.       • promethazine (PHENERGAN) tablet 12.5-25 mg  12.5-25 mg Oral Q4HRS PRN Pepe Kuharevic, M.D.       • promethazine (PHENERGAN) suppository 12.5-25 mg  12.5-25 mg Rectal Q4HRS PRN Pepe Peñaloza M.D.       • prochlorperazine (COMPAZINE) injection 5-10 mg  5-10 mg Intravenous Q4HRS PRN Pepe Peñaloza M.D.       • amLODIPine (NORVASC) tablet 10 mg  10 mg Oral Q EVENING Pepe Peñaloza M.D.   10 mg at 01/07/21 1710   • atenolol (TENORMIN) tablet 25 mg  25 mg Oral Q EVENING  Pepe Peñaloza M.D.   25 mg at 01/07/21 1710   • hydroxychloroquine (Plaquenil) tablet 200 mg  200 mg Oral Q EVENING Pepe Peñaloza M.D.   200 mg at 01/07/21 1710   • furosemide (LASIX) tablet 40 mg  40 mg Oral DAILY Pepe Peñaloza M.D.   40 mg at 01/07/21 0546   • predniSONE (DELTASONE) tablet 5 mg  5 mg Oral Q EVENING Pepe Peñaloza M.D.   Stopped at 01/07/21 1800       Fluids    Intake/Output Summary (Last 24 hours) at 1/7/2021 1805  Last data filed at 1/7/2021 1300  Gross per 24 hour   Intake 1313.75 ml   Output --   Net 1313.75 ml       Laboratory          Recent Labs     01/06/21  1950 01/07/21  0500   SODIUM 134* 133*   POTASSIUM 4.4 4.8   CHLORIDE 91* 95*   CO2 32 32   BUN 25* 31*   CREATININE 3.35* 4.16*   CALCIUM 8.6 8.1*     Recent Labs     01/06/21  1950 01/07/21  0500   ALTSGPT 11 28   ASTSGOT 29 67*   ALKPHOSPHAT 70 73   TBILIRUBIN 0.3 0.4   GLUCOSE 96 86     Recent Labs     01/06/21  1453 01/06/21  1950 01/07/21  0500   WBC 4.4* 4.3* 3.5*   NEUTSPOLYS 78.80* 72.30* 65.80   LYMPHOCYTES 13.20* 16.90* 21.70*   MONOCYTES 5.90 8.90 10.50   EOSINOPHILS 0.70 0.20 0.30   BASOPHILS 0.70 1.20 1.10   ASTSGOT  --  29 67*   ALTSGPT  --  11 28   ALKPHOSPHAT  --  70 73   TBILIRUBIN  --  0.3 0.4     Recent Labs     01/06/21  1453 01/06/21  1950 01/07/21  0500   RBC 1.57* 1.55* 2.21*   HEMOGLOBIN 4.6* 4.6* 6.7*   HEMATOCRIT 15.7* 15.8* 21.2*   PLATELETCT 39* 47* 38*   PROTHROMBTM  --  14.5  --    APTT  --  28.9  --    INR  --  1.16*  --    IRON  --  39*  --    FERRITIN  --  367.0*  --    TOTIRONBC  --  163*  --          Assessment/Plan  Gastrointestinal hemorrhage with melena  Assessment & Plan  H/o hemorrhagic gastritis last scoped 11/20  D/w Dr. Contreras plan for EGD and colonoscopy in am  PPI bid    Anemia, chronic disease- (present on admission)  Assessment & Plan  On epogen  Received frequent transfusions    Lupus (HCC)- (present on admission)  Assessment & Plan  On her home meds prednisone, mycofertic  (pt has her own meds), plaquenil    ESRD (end stage renal disease) on dialysis (HCC)- (present on admission)  Assessment & Plan  Dialysis M/W/F  Discussed with nephrology and not give additional transfusions at this time -- hbg 6.7  Hx of TRALI and also trying to minimize antibodies as she is on the transplant list    Hyponatremia- (present on admission)  Assessment & Plan  Reoccurs, unclear  Will follow    Thrombocytopenia (HCC)- (present on admission)  Assessment & Plan  Chronic  Unclear etiology but below 50 K    Transfer to the floor     VTE:  Contraindicated  Ulcer: PPI  Lines: None    I have performed a physical exam and reviewed and updated ROS and Plan today (1/7/2021). In review of yesterday's note (1/6/2021), there are no changes except as documented above.     Discussed patient condition with pat, Dr. Contreras and Dr. Metz.

## 2021-01-07 NOTE — DISCHARGE PLANNING
Outpatient Dialysis Note    Confirmed patient is active at:    [address] [address]    Schedule: Monday, Wednesday, Friday   Time: 06:00am    Patient is seen by Dr. Trent in HD clinic.    Spoke with Leila at facility who confirmed.    Forwarded records for review.    Mary Han- Dialysis Coordinator  [facility] # [phone]

## 2021-01-07 NOTE — ED NOTES
Pt not in room Room mate thinks she walked out to B/R Checked on pt Pt pale diaph dizzy Vomited  approx 150 cc coffee grounds  C/O severe epigastric pain Pt placed In W/C and returned to bed

## 2021-01-07 NOTE — ASSESSMENT & PLAN NOTE
- Multifactorial: End-stage renal disease, lupus, Plaquenil, thrombocytopenia, hematemesis  - S/p transfusion, but we are attempting to limit transfusions unless absolutely necessary - pt is already antibody positive, and is on the kidney transplant list  - Follow with hematology Dr Garcia as outpatient; discussed with Dr Ortiz 1/8   - LDH and Fibrinogen essentially normal  - Hemoglobin stable today - monitor overnight  - Epogen per Nephrology

## 2021-01-08 ENCOUNTER — ANESTHESIA (OUTPATIENT)
Dept: SURGERY | Facility: MEDICAL CENTER | Age: 26
DRG: 377 | End: 2021-01-08
Payer: COMMERCIAL

## 2021-01-08 PROBLEM — I15.0 RENOVASCULAR HYPERTENSION: Status: ACTIVE | Noted: 2021-01-08

## 2021-01-08 PROBLEM — K92.1 GASTROINTESTINAL HEMORRHAGE WITH MELENA: Status: RESOLVED | Noted: 2021-01-07 | Resolved: 2021-01-08

## 2021-01-08 LAB
ALBUMIN SERPL BCP-MCNC: 2.5 G/DL (ref 3.2–4.9)
ALBUMIN/GLOB SERPL: 0.6 G/DL
ALP SERPL-CCNC: 64 U/L (ref 30–99)
ALT SERPL-CCNC: 20 U/L (ref 2–50)
ANION GAP SERPL CALC-SCNC: 9 MMOL/L (ref 7–16)
AST SERPL-CCNC: 37 U/L (ref 12–45)
BASOPHILS # BLD AUTO: 0.5 % (ref 0–1.8)
BASOPHILS # BLD: 0.02 K/UL (ref 0–0.12)
BILIRUB SERPL-MCNC: 0.4 MG/DL (ref 0.1–1.5)
BUN SERPL-MCNC: 44 MG/DL (ref 8–22)
CALCIUM SERPL-MCNC: 8.6 MG/DL (ref 8.4–10.2)
CHLORIDE SERPL-SCNC: 91 MMOL/L (ref 96–112)
CO2 SERPL-SCNC: 30 MMOL/L (ref 20–33)
CREAT SERPL-MCNC: 5.52 MG/DL (ref 0.5–1.4)
EOSINOPHIL # BLD AUTO: 0.06 K/UL (ref 0–0.51)
EOSINOPHIL NFR BLD: 1.4 % (ref 0–6.9)
ERYTHROCYTE [DISTWIDTH] IN BLOOD BY AUTOMATED COUNT: 57.3 FL (ref 35.9–50)
FIBRINOGEN PPP-MCNC: 405 MG/DL (ref 215–460)
GLOBULIN SER CALC-MCNC: 4 G/DL (ref 1.9–3.5)
GLUCOSE SERPL-MCNC: 84 MG/DL (ref 65–99)
HCT VFR BLD AUTO: 21.4 % (ref 37–47)
HGB BLD-MCNC: 6.7 G/DL (ref 12–16)
IMM GRANULOCYTES # BLD AUTO: 0.01 K/UL (ref 0–0.11)
IMM GRANULOCYTES NFR BLD AUTO: 0.2 % (ref 0–0.9)
INR PPP: 1.01 (ref 0.87–1.13)
LDH SERPL L TO P-CCNC: 270 U/L (ref 107–266)
LYMPHOCYTES # BLD AUTO: 0.54 K/UL (ref 1–4.8)
LYMPHOCYTES NFR BLD: 13 % (ref 22–41)
MCH RBC QN AUTO: 29.9 PG (ref 27–33)
MCHC RBC AUTO-ENTMCNC: 31.3 G/DL (ref 33.6–35)
MCV RBC AUTO: 95.5 FL (ref 81.4–97.8)
MONOCYTES # BLD AUTO: 0.3 K/UL (ref 0–0.85)
MONOCYTES NFR BLD AUTO: 7.2 % (ref 0–13.4)
NEUTROPHILS # BLD AUTO: 3.21 K/UL (ref 2–7.15)
NEUTROPHILS NFR BLD: 77.7 % (ref 44–72)
NRBC # BLD AUTO: 0 K/UL
NRBC BLD-RTO: 0 /100 WBC
PLATELET # BLD AUTO: 47 K/UL (ref 164–446)
PMV BLD AUTO: 13.1 FL (ref 9–12.9)
POTASSIUM SERPL-SCNC: 4.7 MMOL/L (ref 3.6–5.5)
PROT SERPL-MCNC: 6.5 G/DL (ref 6–8.2)
PROTHROMBIN TIME: 13 SEC (ref 12–14.6)
RBC # BLD AUTO: 2.24 M/UL (ref 4.2–5.4)
SODIUM SERPL-SCNC: 130 MMOL/L (ref 135–145)
WBC # BLD AUTO: 4.1 K/UL (ref 4.8–10.8)

## 2021-01-08 PROCEDURE — 36415 COLL VENOUS BLD VENIPUNCTURE: CPT

## 2021-01-08 PROCEDURE — 160203 HCHG ENDO MINUTES - 1ST 30 MINS LEVEL 4: Performed by: INTERNAL MEDICINE

## 2021-01-08 PROCEDURE — A9270 NON-COVERED ITEM OR SERVICE: HCPCS | Performed by: INTERNAL MEDICINE

## 2021-01-08 PROCEDURE — 0D538ZZ DESTRUCTION OF LOWER ESOPHAGUS, VIA NATURAL OR ARTIFICIAL OPENING ENDOSCOPIC: ICD-10-PCS | Performed by: INTERNAL MEDICINE

## 2021-01-08 PROCEDURE — 160035 HCHG PACU - 1ST 60 MINS PHASE I: Performed by: INTERNAL MEDICINE

## 2021-01-08 PROCEDURE — 700111 HCHG RX REV CODE 636 W/ 250 OVERRIDE (IP): Performed by: INTERNAL MEDICINE

## 2021-01-08 PROCEDURE — 0D548ZZ DESTRUCTION OF ESOPHAGOGASTRIC JUNCTION, VIA NATURAL OR ARTIFICIAL OPENING ENDOSCOPIC: ICD-10-PCS | Performed by: INTERNAL MEDICINE

## 2021-01-08 PROCEDURE — 85384 FIBRINOGEN ACTIVITY: CPT

## 2021-01-08 PROCEDURE — 160002 HCHG RECOVERY MINUTES (STAT): Performed by: INTERNAL MEDICINE

## 2021-01-08 PROCEDURE — 80053 COMPREHEN METABOLIC PANEL: CPT

## 2021-01-08 PROCEDURE — 5A1D70Z PERFORMANCE OF URINARY FILTRATION, INTERMITTENT, LESS THAN 6 HOURS PER DAY: ICD-10-PCS | Performed by: INTERNAL MEDICINE

## 2021-01-08 PROCEDURE — 160048 HCHG OR STATISTICAL LEVEL 1-5: Performed by: INTERNAL MEDICINE

## 2021-01-08 PROCEDURE — 770006 HCHG ROOM/CARE - MED/SURG/GYN SEMI*

## 2021-01-08 PROCEDURE — 700101 HCHG RX REV CODE 250: Performed by: ANESTHESIOLOGY

## 2021-01-08 PROCEDURE — 700111 HCHG RX REV CODE 636 W/ 250 OVERRIDE (IP): Performed by: ANESTHESIOLOGY

## 2021-01-08 PROCEDURE — 85025 COMPLETE CBC W/AUTO DIFF WBC: CPT

## 2021-01-08 PROCEDURE — 700102 HCHG RX REV CODE 250 W/ 637 OVERRIDE(OP): Performed by: INTERNAL MEDICINE

## 2021-01-08 PROCEDURE — 99233 SBSQ HOSP IP/OBS HIGH 50: CPT | Performed by: FAMILY MEDICINE

## 2021-01-08 PROCEDURE — 83010 ASSAY OF HAPTOGLOBIN QUANT: CPT

## 2021-01-08 PROCEDURE — 0DJD8ZZ INSPECTION OF LOWER INTESTINAL TRACT, VIA NATURAL OR ARTIFICIAL OPENING ENDOSCOPIC: ICD-10-PCS | Performed by: INTERNAL MEDICINE

## 2021-01-08 PROCEDURE — C9113 INJ PANTOPRAZOLE SODIUM, VIA: HCPCS | Performed by: INTERNAL MEDICINE

## 2021-01-08 PROCEDURE — 160009 HCHG ANES TIME/MIN: Performed by: INTERNAL MEDICINE

## 2021-01-08 PROCEDURE — 160036 HCHG PACU - EA ADDL 30 MINS PHASE I: Performed by: INTERNAL MEDICINE

## 2021-01-08 PROCEDURE — 700105 HCHG RX REV CODE 258: Performed by: ANESTHESIOLOGY

## 2021-01-08 PROCEDURE — 90935 HEMODIALYSIS ONE EVALUATION: CPT | Performed by: INTERNAL MEDICINE

## 2021-01-08 PROCEDURE — 0D568ZZ DESTRUCTION OF STOMACH, VIA NATURAL OR ARTIFICIAL OPENING ENDOSCOPIC: ICD-10-PCS | Performed by: INTERNAL MEDICINE

## 2021-01-08 PROCEDURE — 500066 HCHG BITE BLOCK, ECT: Performed by: INTERNAL MEDICINE

## 2021-01-08 PROCEDURE — 90935 HEMODIALYSIS ONE EVALUATION: CPT

## 2021-01-08 PROCEDURE — 85610 PROTHROMBIN TIME: CPT

## 2021-01-08 PROCEDURE — 160208 HCHG ENDO MINUTES - EA ADDL 1 MIN LEVEL 4: Performed by: INTERNAL MEDICINE

## 2021-01-08 PROCEDURE — 83615 LACTATE (LD) (LDH) ENZYME: CPT

## 2021-01-08 PROCEDURE — 502240 HCHG MISC OR SUPPLY RC 0272: Performed by: INTERNAL MEDICINE

## 2021-01-08 RX ORDER — HYDRALAZINE HYDROCHLORIDE 20 MG/ML
5 INJECTION INTRAMUSCULAR; INTRAVENOUS
Status: DISCONTINUED | OUTPATIENT
Start: 2021-01-08 | End: 2021-01-08 | Stop reason: HOSPADM

## 2021-01-08 RX ORDER — ONDANSETRON 2 MG/ML
4 INJECTION INTRAMUSCULAR; INTRAVENOUS
Status: COMPLETED | OUTPATIENT
Start: 2021-01-08 | End: 2021-01-08

## 2021-01-08 RX ORDER — LORAZEPAM 2 MG/ML
0.5 INJECTION INTRAMUSCULAR EVERY 4 HOURS PRN
Status: DISCONTINUED | OUTPATIENT
Start: 2021-01-08 | End: 2021-01-09 | Stop reason: HOSPADM

## 2021-01-08 RX ORDER — DIPHENHYDRAMINE HYDROCHLORIDE 50 MG/ML
10 INJECTION INTRAMUSCULAR; INTRAVENOUS
Status: DISCONTINUED | OUTPATIENT
Start: 2021-01-08 | End: 2021-01-08 | Stop reason: HOSPADM

## 2021-01-08 RX ORDER — SODIUM CHLORIDE 9 MG/ML
INJECTION, SOLUTION INTRAVENOUS CONTINUOUS
Status: DISCONTINUED | OUTPATIENT
Start: 2021-01-08 | End: 2021-01-08 | Stop reason: HOSPADM

## 2021-01-08 RX ORDER — LIDOCAINE HYDROCHLORIDE 20 MG/ML
INJECTION, SOLUTION EPIDURAL; INFILTRATION; INTRACAUDAL; PERINEURAL PRN
Status: DISCONTINUED | OUTPATIENT
Start: 2021-01-08 | End: 2021-01-08 | Stop reason: SURG

## 2021-01-08 RX ORDER — MIDAZOLAM HYDROCHLORIDE 1 MG/ML
INJECTION INTRAMUSCULAR; INTRAVENOUS PRN
Status: DISCONTINUED | OUTPATIENT
Start: 2021-01-08 | End: 2021-01-08 | Stop reason: SURG

## 2021-01-08 RX ORDER — SUCRALFATE ORAL 1 G/10ML
1 SUSPENSION ORAL EVERY 6 HOURS
Status: DISCONTINUED | OUTPATIENT
Start: 2021-01-08 | End: 2021-01-09 | Stop reason: HOSPADM

## 2021-01-08 RX ORDER — SODIUM CHLORIDE 9 MG/ML
INJECTION, SOLUTION INTRAVENOUS
Status: DISCONTINUED | OUTPATIENT
Start: 2021-01-08 | End: 2021-01-08 | Stop reason: SURG

## 2021-01-08 RX ADMIN — PROPOFOL 50 MG: 10 INJECTION, EMULSION INTRAVENOUS at 14:55

## 2021-01-08 RX ADMIN — SUCRALFATE 1 G: 1 SUSPENSION ORAL at 17:31

## 2021-01-08 RX ADMIN — ONDANSETRON 4 MG: 2 INJECTION INTRAMUSCULAR; INTRAVENOUS at 10:39

## 2021-01-08 RX ADMIN — PROPOFOL 20 MG: 10 INJECTION, EMULSION INTRAVENOUS at 15:10

## 2021-01-08 RX ADMIN — PANTOPRAZOLE SODIUM 40 MG: 40 INJECTION, POWDER, LYOPHILIZED, FOR SOLUTION INTRAVENOUS at 17:31

## 2021-01-08 RX ADMIN — HYDROXYCHLOROQUINE SULFATE 200 MG: 200 TABLET, FILM COATED ORAL at 17:31

## 2021-01-08 RX ADMIN — EPOETIN ALFA-EPBX 12000 UNITS: 4000 INJECTION, SOLUTION INTRAVENOUS; SUBCUTANEOUS at 09:00

## 2021-01-08 RX ADMIN — PROPOFOL 50 MG: 10 INJECTION, EMULSION INTRAVENOUS at 14:49

## 2021-01-08 RX ADMIN — AMLODIPINE BESYLATE 10 MG: 5 TABLET ORAL at 17:33

## 2021-01-08 RX ADMIN — SODIUM CHLORIDE: 9 INJECTION, SOLUTION INTRAVENOUS at 14:44

## 2021-01-08 RX ADMIN — PROPOFOL 30 MG: 10 INJECTION, EMULSION INTRAVENOUS at 15:14

## 2021-01-08 RX ADMIN — MIDAZOLAM HYDROCHLORIDE 1 MG: 1 INJECTION, SOLUTION INTRAMUSCULAR; INTRAVENOUS at 14:44

## 2021-01-08 RX ADMIN — PROPOFOL 30 MG: 10 INJECTION, EMULSION INTRAVENOUS at 15:05

## 2021-01-08 RX ADMIN — PANTOPRAZOLE SODIUM 40 MG: 40 INJECTION, POWDER, LYOPHILIZED, FOR SOLUTION INTRAVENOUS at 06:15

## 2021-01-08 RX ADMIN — LIDOCAINE HYDROCHLORIDE 50 MG: 20 INJECTION, SOLUTION EPIDURAL; INFILTRATION; INTRACAUDAL; PERINEURAL at 14:49

## 2021-01-08 RX ADMIN — FENTANYL CITRATE 25 MCG: 50 INJECTION, SOLUTION INTRAMUSCULAR; INTRAVENOUS at 16:14

## 2021-01-08 RX ADMIN — ONDANSETRON 4 MG: 2 INJECTION INTRAMUSCULAR; INTRAVENOUS at 02:16

## 2021-01-08 RX ADMIN — ATENOLOL 25 MG: 25 TABLET ORAL at 17:33

## 2021-01-08 RX ADMIN — PROPOFOL 100 MCG/KG/MIN: 10 INJECTION, EMULSION INTRAVENOUS at 14:49

## 2021-01-08 RX ADMIN — LORAZEPAM 0.5 MG: 2 INJECTION INTRAMUSCULAR; INTRAVENOUS at 07:34

## 2021-01-08 RX ADMIN — ONDANSETRON 4 MG: 2 INJECTION INTRAMUSCULAR; INTRAVENOUS at 16:15

## 2021-01-08 RX ADMIN — FUROSEMIDE 40 MG: 40 TABLET ORAL at 10:39

## 2021-01-08 ASSESSMENT — PAIN DESCRIPTION - PAIN TYPE: TYPE: ACUTE PAIN

## 2021-01-08 ASSESSMENT — ENCOUNTER SYMPTOMS
FEVER: 0
ROS GI COMMENTS: HEMATEMESIS
ABDOMINAL PAIN: 0
HEMOPTYSIS: 0
DIZZINESS: 0
CHILLS: 0
VOMITING: 1
SHORTNESS OF BREATH: 0
COUGH: 0

## 2021-01-08 ASSESSMENT — PAIN SCALES - GENERAL: PAIN_LEVEL: 5

## 2021-01-08 NOTE — PROGRESS NOTES
Indication: Anemia, GI bleed.   Risks, benefits, and alternatives were discussed with consenting person(s). Consenting person(s) were given an opportunity to ask questions and discuss other options. Risks including but not limited to failed or incomplete EGD/Colonoscopy, ineffective therapy, perforation, infection, bleeding, missed lesion(s), missed diagnosis, cardiac and/or pulmonary event, aspiration, stroke, possible need for surgery, hospitalization possibly prolonged, discomfort, unsuccessful and/or incomplete procedure, possible need for repeat procedures and/or additional testings, damage to adjacent organs and/or vascular structures, medication reaction, disability, death, and other adverse events possibly life-threatening. Discussion was undertaken with Layman's terms.  Discussed radiofrequency ablation and argon plasma coagulation. Consenting persons stated understanding and acceptance of these risks, and wished to proceed. Consent was given in clear state of mind.

## 2021-01-08 NOTE — ASSESSMENT & PLAN NOTE
- Pt with hematemesis in November as well, was scoped 11/12/20 - found to haveemorrhagic gastritis treated with APC  - Thrombocytopenia likely contributing to continued bleeding, LDH and fibrinogen nml  - Colonoscopy 1/8 without acute findings, EGD with hemorrhagic gastritis treated with APC and RFA, carafate added to PPI  - monitor overnight - if no further episodes, can dc in the am  - Advance to FLD, if she tolerates, advance further for dinner

## 2021-01-08 NOTE — PROGRESS NOTES
Allegra from Lab called with critical lab of platelets 47 at 0558. Critical lab result read back to Allegra.   called, awaiting call back.

## 2021-01-08 NOTE — ASSESSMENT & PLAN NOTE
H/o hemorrhagic gastritis last scoped 11/20  D/w Dr. Contreras plan for EGD and colonoscopy in am  PPI bid

## 2021-01-08 NOTE — PROCEDURES
Diagnosis: End-Stage Renal Disease admitted with symptomatic anemia, GI bleed. Patient seen and examined on hemodialysis during treatment. Patient is stable, tolerating hemodialysis. Denies chest pain and shortness of breath. Orders updated as needed. Please refer to flowsheet for full details.    Access: R BC AVF  UF goal: 3L    Plan: Continue HD Monday Wednesday Friday. Plans for endoscopy per GI. Continue outpatient dose of epo 12,000 units thrice weekly with HD.    Navin Metz MD  Nephrology

## 2021-01-08 NOTE — OR NURSING
1541 Pt to PACU s/p colonoscopy   1549  at BS discussing procedure with pt.  1600 Pt given ice chips  1621 Update to pt's mother via phone  1622 pt given warm blanket, states she is starting to have some relief from pain.  1632 Pt sleeping in gurney  1640 Pt states ready to go back to her room, pain at tolerable level and no nausea. Report to Sylvia MARTINEZ  1655 Pt back to inpt room with belongings, via transport

## 2021-01-08 NOTE — PROGRESS NOTES
Page On-call GI - to get orders of Golytely. Pt educated to be NPO at midnight and The importance of prep for the procedure tomorrow. Pt verbalizes understanding. All needs met. Rounding in place.

## 2021-01-08 NOTE — ASSESSMENT & PLAN NOTE
Dialysis M/W/F  Discussed with nephrology and not give additional transfusions at this time -- hbg 6.7  Hx of TRALI and also trying to minimize antibodies as she is on the transplant list

## 2021-01-08 NOTE — PROGRESS NOTES
Kayla from outpatient transfusion center called this RN, pt has appointment set for tomorrow for outpatient blood transfusion. Talked to Dr. Zapata, pt will not be discharged today and she has already received transfusion in ICU, Kayla informed and appointment cancelled.     Dr. Zapata updated on Hgb of 6.7, per Dr. Zapata this is hemodynamically stable for this pt. No transfusion needed at this time.

## 2021-01-08 NOTE — CONSULTS
"Nephrology Consultation    Date of Service  1/7/2021    Referring Physician  CASIE Merida.*    Consulting Physician  Navin Metz M.D.    Reason for Consultation  Management of ESRD on HD      History of Presenting Illness  25 y.o. female with a history of lupus, ESRD on hemodialysis Monday Wednesday Friday who presented 1/6/2021 with symptomatic anemia.    The patient has been doing her dialysis regularly on Mondays Wednesdays and Fridays at [facility], under the care of Dr. Trent.  She had routine dialysis yesterday, and felt unwell after dialysis.  She was feeling nauseous, and then she had some vomiting episodes.  She describes blood in her vomit.  She also describes some blood in her bowel movements.  She continued to feel lightheaded and unwell, so she went to get her labs checked.  She was later called and told that she needed to come to the emergency room due to severe anemia.  Patient received 2 units of blood transfusions overnight.  This morning, patient says she feels less lightheaded, denies chest pain, shortness of breath, headache, nausea, vomiting.    She says she has been on dialysis for about 6 years.  She still urinates.  She is listed for transplant at [facility].    Review of Systems  Review of Systems   Constitutional: Negative for fever.   Respiratory: Negative for shortness of breath.    Cardiovascular: Negative for chest pain.   Gastrointestinal: Positive for blood in stool, nausea and vomiting. Negative for abdominal pain.   All other systems reviewed and are negative.      Past Medical History  Past Medical History:   Diagnosis Date   • Anemia 01/17/2018   • AVF (arteriovenous fistula) ([facility])     Right Arm   • Dialysis patient ([facility])      dialysis, M,W,F [facility]   • ESRD (end stage renal disease) on dialysis ([facility]) 01/17/2018    Twan Fu   • Heart burn    • Hypertension 01/17/2018    \"Controlled with medication\"   • Indigestion    • Lupus ([facility])    • Migraines 01/17/2018   • " "Seizure (HCC) 2013    from high blood pressure, reports 1 time event       Surgical History  Past Surgical History:   Procedure Laterality Date   • PB UPPER GI ENDOSCOPY,CTRL BLEED  11/12/2020    Procedure: GASTROSCOPY, WITH ARGON PLASMA COAGULATION;  Surgeon: Gadiel Whitney M.D.;  Location: [facility];  Service: Gastroenterology   • PB UPPER GI ENDOSCOPY,BIOPSY  11/12/2020    Procedure: GASTROSCOPY, WITH BIOPSY;  Surgeon: Gadiel Whitney M.D.;  Location: SURGERY [facility];  Service: Gastroenterology   • GASTROSCOPY-ENDO  11/12/2020    Procedure: GASTROSCOPY;  Surgeon: Gadiel Whitney M.D.;  Location: SURGERY [facility];  Service: Gastroenterology   • GASTROSCOPY-ENDO  9/18/2020    Procedure: GASTROSCOPY;  Surgeon: Gadiel Whitney M.D.;  Location: [facility];  Service: Gastroenterology   • GASTROSCOPY N/A 5/30/2020    Procedure: GASTROSCOPY;  Surgeon: Waylon Mcqueen M.D.;  Location: [facility];  Service: Gastroenterology   • GASTROSCOPY-ENDO  12/9/2019    Procedure: GASTROSCOPY;  Surgeon: Aaron Kam M.D.;  Location: [facility];  Service: Gastroenterology   • ANGIOPLASTY  01/17/2018    \"Right Arm AV-Fistulagram & Angioplastyx3\"   • ULI BY LAPAROSCOPY  4/5/2010    Performed by SYED MARTELL at SURGERY [facility]   • AV FISTULA CREATION Right    • OTHER      renal biopsy x 3   • OTHER      bone marrow biopsy       Family History  Family History   Problem Relation Age of Onset   • Diabetes Paternal Grandmother        Social History  Social History     Socioeconomic History   • Marital status: Single     Spouse name: Not on file   • Number of children: Not on file   • Years of education: Not on file   • Highest education level: Not on file   Occupational History   • Not on file   Social Needs   • Financial resource strain: Not on file   • Food insecurity     Worry: Never true     Inability: Never true   • Transportation needs     Medical: No "     Non-medical: No   Tobacco Use   • Smoking status: Never Smoker   • Smokeless tobacco: Never Used   Substance and Sexual Activity   • Alcohol use: No   • Drug use: No   • Sexual activity: Not on file   Lifestyle   • Physical activity     Days per week: Not on file     Minutes per session: Not on file   • Stress: Not on file   Relationships   • Social connections     Talks on phone: Not on file     Gets together: Not on file     Attends [employer] service: Not on file     Active member of club or organization: Not on file     Attends meetings of clubs or organizations: Not on file     Relationship status: Not on file   • Intimate partner violence     Fear of current or ex partner: Not on file     Emotionally abused: Not on file     Physically abused: Not on file     Forced sexual activity: Not on file   Other Topics Concern   • Not on file   Social History Narrative   • Not on file       Medications  Current Facility-Administered Medications   Medication Dose Route Frequency Provider Last Rate Last Admin   • pantoprazole (PROTONIX) injection 40 mg  40 mg Intravenous BID Ivette Eckert M.D.   40 mg at 01/07/21 1157   • NS (BOLUS) infusion 250 mL  250 mL Intravenous DIALYSIS PRN Navin Metz M.D.       • lidocaine (XYLOCAINE) 1 % injection 1 mL  1 mL Intradermal PRN Navin Metz M.D.       • NS (BOLUS) infusion 0-250 mL  0-250 mL Intravenous Once Navin Metz M.D.       • [START ON 1/8/2021] epoetin (Retacrit) injection (Dialysis use only) 12,000 Units  12,000 Units Intravenous MO, WE + FR Navin Metz M.D.       • senna-docusate (PERICOLACE or SENOKOT S) 8.6-50 MG per tablet 2 Tab  2 Tab Oral BID Pepe Peñaloza M.D.        And   • polyethylene glycol/lytes (MIRALAX) PACKET 1 Packet  1 Packet Oral QDAY PRN Pepe Peñaloza M.D.        And   • magnesium hydroxide (MILK OF MAGNESIA) suspension 30 mL  30 mL Oral QDAY PRN Pepe Peñaloza M.D.        And   • bisacodyl (DULCOLAX) suppository 10 mg  10 mg Rectal  QDAY PRN Pepe Peñaloza M.D.       • ondansetron (ZOFRAN) syringe/vial injection 4 mg  4 mg Intravenous Q4HRS PRN Pepe Peñaloza M.D.       • ondansetron (ZOFRAN ODT) dispertab 4 mg  4 mg Oral Q4HRS PRN Pepe Peñaloza M.D.       • promethazine (PHENERGAN) tablet 12.5-25 mg  12.5-25 mg Oral Q4HRS PRN Pepe Peñaloza M.D.       • promethazine (PHENERGAN) suppository 12.5-25 mg  12.5-25 mg Rectal Q4HRS PRN Pepe Peñaloza M.D.       • prochlorperazine (COMPAZINE) injection 5-10 mg  5-10 mg Intravenous Q4HRS PRN Pepe Peñaloza M.D.       • amLODIPine (NORVASC) tablet 10 mg  10 mg Oral Q EVENING Pepe Peñaloza M.D.       • atenolol (TENORMIN) tablet 25 mg  25 mg Oral Q EVENING Pepe Peñaloza M.D.       • hydroxychloroquine (Plaquenil) tablet 200 mg  200 mg Oral Q EVENING Pepe Peñaloza M.D.       • furosemide (LASIX) tablet 40 mg  40 mg Oral DAILY Pepe Peñaloza M.D.   40 mg at 01/07/21 0546   • predniSONE (DELTASONE) tablet 5 mg  5 mg Oral Q EVENING Pepe Peñaloza M.D.           Allergies  Allergies   Allergen Reactions   • Clindamycin Rash     Hive   • Keflex Rash     Hives   • Methylprednisolone      Anxious;   • Metoprolol Nausea       Physical Exam  Temp:  [36.3 °C (97.3 °F)-37.3 °C (99.1 °F)] 36.3 °C (97.3 °F)  Pulse:  [] 83  Resp:  [8-32] 18  BP: (102-164)/(53-96) 157/96  SpO2:  [93 %-100 %] 94 %    Physical Exam   Constitutional: She is oriented to person, place, and time. She appears well-developed. No distress.   HENT:   Mouth/Throat: Oropharynx is clear and moist. No oropharyngeal exudate.   Eyes: EOM are normal. No scleral icterus.   Neck: No tracheal deviation present.   Cardiovascular: Normal heart sounds.   No murmur heard.  Pulmonary/Chest: Effort normal. No stridor. She has no rales.   Abdominal: Soft. There is no abdominal tenderness.   Musculoskeletal: Normal range of motion.         General: No edema.   Neurological: She is alert and oriented to person, place, and  time.   Skin: Skin is warm and dry.   Psychiatric: She has a normal mood and affect.   Access: Right brachiocephalic AV fistula, with patent bruit and thrill.    Fluids  Date 01/07/21 0700 - 01/08/21 0659   Shift 8925-7205 8175-4478 9227-0340 24 Hour Total   INTAKE   P.O. 320   320   IV Piggyback 293.8   293.8   Shift Total 613.8   613.8   OUTPUT   Shift Total       Weight (kg) 59.5 59.5 59.5 59.5       Laboratory  Labs reviewed, pertinent labs below.  Recent Labs     01/06/21  1453 01/06/21 1950 01/07/21  0500   WBC 4.4* 4.3* 3.5*   RBC 1.57* 1.55* 2.21*   HEMOGLOBIN 4.6* 4.6* 6.7*   HEMATOCRIT 15.7* 15.8* 21.2*   .0* 101.3* 95.9   MCH 28.7 29.0 30.3   MCHC 28.7* 28.7* 31.6*   RDW 59.8* 59.9* 54.4*   PLATELETCT 39* 47* 38*   MPV 13.6* 12.7 12.9     Recent Labs     01/06/21  1950 01/07/21  0500   SODIUM 134* 133*   POTASSIUM 4.4 4.8   CHLORIDE 91* 95*   CO2 32 32   GLUCOSE 96 86   BUN 25* 31*   CREATININE 3.35* 4.16*   CALCIUM 8.6 8.1*     Recent Labs     01/06/21 1950   APTT 28.9   INR 1.16*            URINALYSIS:  Lab Results   Component Value Date/Time    COLORURINE Yellow 07/16/2020 0900    CLARITY Clear 07/16/2020 0900    SPECGRAVITY 1.007 07/16/2020 0900    PHURINE >=9.0 (A) 07/16/2020 0900    KETONES Negative 07/16/2020 0900    PROTEINURIN 100 (A) 07/16/2020 0900    PROTEINURIN 100 (A) 07/16/2020 0900    BILIRUBINUR Negative 07/16/2020 0900    UROBILU 0.2 07/16/2020 0900    NITRITE Negative 07/16/2020 0900    LEUKESTERAS Negative 07/16/2020 0900    OCCULTBLOOD Trace (A) 07/16/2020 0900     UPC  Lab Results   Component Value Date/Time    TOTPROTUR 45.0 (H) 07/16/2020 0900      Lab Results   Component Value Date/Time    CREATININEU 18.93 07/16/2020 0900       Imaging reviewed  DX-CHEST-PORTABLE (1 VIEW)   Final Result      Resolved consolidation with stable cardiac silhouette enlargement            Assessment/Plan  25 y.o. female with a history of lupus, ESRD on hemodialysis Monday Wednesday Friday who  presented 1/6/2021 with symptomatic anemia.    1.  ESRD on hemodialysis Monday Wednesday Friday.  Nonoliguric.  No acute need for dialysis today.  Plan for dialysis tomorrow per usual schedule.    2. Access: R BC AVF, patent. Right arm precautions.    3. Acute blood loss anemia. Concern for GI bleed. Appreciate GI consult, plan for endoscopy in near future.    4. Anemia of chronic disease. Continue epo 12,000 units thrice weekly with HD.  Given the patient is listed for kidney transplant, would attempt to avoid blood transfusions unless absolutely necessary.    5. Thrombocytopenia, worsening.  Unclear etiology.  Check CBC daily.    6.  Hyponatremia, worsening.  Limit hypotonic fluids.  Check labs daily.    Discussed with Dr. Roselia Metz MD  Nephrology

## 2021-01-08 NOTE — ANESTHESIA TIME REPORT
Anesthesia Start and Stop Event Times     Date Time Event    1/8/2021 02:40 PM Ready for Procedure    1/8/2021 02:44 PM Anesthesia Start    1/8/2021 03:44 PM Anesthesia Stop        Responsible Staff  01/08/21    Name Role Begin End    Shameka Matta M.D. Anesthesiologist 01/08/21 02:44 PM 01/08/21 03:44 PM        Preop Diagnosis (Free Text):  Pre-op Diagnosis     Anemia and GI bleed        Preop Diagnosis (Codes):    Post op Diagnosis  Acute blood loss anemia  GI bleed    Premium Reason  A. 3PM - 7AM    Comments: EGD w/ argon coagulation; Colonoscopy

## 2021-01-08 NOTE — PROCEDURES
Patient Name: Lily Nicole    Esophagogastroduodenoscopy/Colonoscopy Procedure Note  Date of Procedure: 1/8/2021  Attending Physician: Herbert Contreras M.D.        Indications: Anemia, melena and hematemesis  Instrument: Olympus Flexible Variable Stiffness Endoscope and Colonoscope  Sedation:   Anesthesiologist/Type of Anesthesia:  Anesthesiologist: Shameka Matta M.D./ELKE    Surgical Staff:  Circulator: Renan Mcbride R.N.  Endoscopy Technician: Veronica Golria    Procedure Details:      Pre-Anesthesia Assessment:  Prior to the procedure, a History and Physical was performed, and patient medications and allergies were reviewed. The patient’s tolerance of previous anesthesia was also reviewed. The risks and benefits of the procedure and the sedation options and risks were discussed with the patient including but not limited to infection, bleeding, aspiration, perforation, adverse medication reaction, missed diagnosis, and missed lesions. The patient verbalized understanding. All questions were answered, and informed consent was obtained.     After I obtained informed consent from the patient, the patient was placed in the left lateral position. Appropriate time-out protocol was followed: the correct patient, the correct procedure, and the correct equipment in the room were confirmed. Throughout the procedure, the patient’s blood pressure, pulse, and oxygen saturations were monitored continuously    The Olympus flexible gastroscope was gently passed through the incisoral orifice into the oral cavity and under direct visualization the esophagus was intubated. The endoscope was passed down the esophagus, through the stomach and into the 2nd portion of the duodenum. Retroflexion was performed at the gastroesophageal junction. Color, texture, mucosa and anatomy of esophagus, stomach, and duodenum were carefully examined with the scope.  After completion of the examination, the endoscope as  removed. The patient tolerated the procedure well. There were no immediate postoperative complications.     After completion of EGD, a digital rectal exam was performed. The Olympus variable stiffness colonoscope was introduced through the anus and passed under direct vision. The scope was advanced to the cecum, identified by the appendiceal orifice and ileocecal valve. The colonscopy was performed without difficulty. The patient tolerated the procedure well. The quality of the bowel preparation was POOR/Suboptimal  Findings and interventions were performed and documented below. The endoscope was then removed and the procedure was terminated. There were no immediate postoperative complications.        Findings:    EGD:  Esophagus: Normal esophagus.  Irregular Z-line but no obvious Encarnacion's esophagus.    Stomach: Immediately distal of the GE junction there appears to be oozing gastropathy without obvious source of massive bleeding.  There was old blood and dark liquid matter suggestive of acid hematin in the stomach fundus.  Extensive lavage and suctioning was performed.  There was also some food debris in the stomach fundus.  There was mild oozing of blood in the antrum as well.  Given that patient has had multiple argon plasma coagulation, consideration was performed using radiofrequency ablation to superficially ablate more surface area.  Multiple locations with ablated with a through-the-scope radiofrequency RFA/Barrx device.  Location near the lower esophagus and GE junction was also ablated.  There was one area on retroflexion that was seen near the cardia/fundus that was also has some old blood clots.  This was lavaged and then treated with argon plasma coagulation circumferential due to the angulation at the RFA paddle is unable to reach that location    Duodenum: First and second portion duodenum was normal without active bleeding    Colonoscopy:  Terminal ileum with melenic material  Cecum: Suboptimal prep  throughout the entire colon hindering visualization.  Extensive lavage was performed.  There was some stool material.  No mass greater than 1 cm was seen.  No active fresh blood was seen  Ascending colon: Suboptimal prep throughout the entire colon hindering visualization.  Extensive lavage was performed.  There was some stool material.  No mass greater than 1 cm was seen.  No active fresh blood was seen  Transverse colon: Suboptimal prep throughout the entire colon hindering visualization.  Extensive lavage was performed.  There was some stool material.  No mass greater than 1 cm was seen.  No active fresh blood was seen  Descending colon: Suboptimal prep throughout the entire colon hindering visualization.  Extensive lavage was performed.  There was some stool material.  No mass greater than 1 cm was seen.  No active fresh blood was seen  Sigmoid colon: Suboptimal prep throughout the entire colon hindering visualization.  Extensive lavage was performed.  There was some stool material.  No mass greater than 1 cm was seen.  No active fresh blood was seen  Rectum: Suboptimal prep throughout the entire colon hindering visualization.  Extensive lavage was performed.  There was some stool material.  No mass greater than 1 cm was seen.  No active fresh blood was seen.    Impressions:   1.  Hemorrhagic gastritis likely the source of patient's anemia.  Location endocardiac near the GE junction as well as antrum.  Hemostasis and ablation performed with radiofrequency ablation (Barrxx) and argon plasma coagulation.  There was no focal spot of bleeding just oozing gastropathy.  2.  Suboptimal prep throughout the entire colon with difficulty visualization (due to melenic liquid material) however no large lesion was seen and no active fresh blood noted.  Suspect upper GI source      Recommendations:   1.  Continue serial H&H  2.  Maximum PPI therapy and carafate.  3.  Transfuse as per nephrology  4.  Consideration for tertiary  care hospital management      This note was generated using voice recognition software which has a small chance of producing errors of grammar and possibly content. I have made every reasonable attempt to find and correct any obvious errors, but expect that some may not be found prior to finalization of this note

## 2021-01-08 NOTE — PROGRESS NOTES
[facility] Services Progress Note     Hemodialysis treatment ordered today per Dr. BRIANNA Metz x 3 hours.   Treatment initiated at 0701 ended at 1001.      Patient tolerated tx; see paper flow sheet for details.      Net UF 3100 mL.      Needles removed from access site. Dressings applied and sites held x 10 minutes; verified no bleeding. Positive bruit/thrill post tx. Staff RN to monitor AVF for breakthrough bleeding. Should breakthrough bleeding occur, staff RN to apply pressure to access sites until bleeding resolved. Notify Dialysis and Nephrologist for follow-up.     Report given to Primary RN.

## 2021-01-08 NOTE — ANESTHESIA PREPROCEDURE EVALUATION
"25 y.o. female with complicated medical history, including lupus, end-stage renal disease on hemodialysis Monday Wednesday Friday, severe chronic anemia, peptic ulcer disease, GI bleed, seizure, hypertension, who presented 1/6/2021 with generalized weakness.  She was sent to emergency department by transfusion RN after her CBC showed hemoglobin 4.6 today.     Medical History    Past Medical History     Date Comments   Lupus ([facility]) [M32.9]     Anemia [D64.9] 01/17/2018    AVF (arteriovenous fistula) ([facility]) [I77.0]  Right Arm   Seizure ([facility]) [R56.9] 2013 from high blood pressure, reports 1 time event   ESRD (end stage renal disease) on dialysis ([facility]) [N18.6, Z99.2] 01/17/2018 Twan Fu   Hypertension [I10] 01/17/2018 \"Controlled with medication\"   Indigestion [K30]     Migraines [G43.909] 01/17/2018    Heart burn [R12]     Dialysis patient ([facility]) [Z99.2]   dialysis, M,W,F Elizabeth/Joni      Surgical History    Past Surgical History    Past Surgical History Laterality Last Occurrence Comments   ULI BY LAPAROSCOPY [51.22B] Not specified 4/5/2010 Performed by SYED MARTELL at SURGERY [facility]   AV FISTULA CREATION [39.27] Right Not specified None   ANGIOPLASTY [WP41775] Not specified 01/17/2018 \"Right Arm AV-Fistulagram & Angioplastyx3\"   OTHER [ST83103] Not specified Not specified renal biopsy x 3   OTHER [GR85797] Not specified Not specified bone marrow biopsy   GASTROSCOPY-ENDO [E1.10] Not specified 12/9/2019 Procedure: GASTROSCOPY;  Surgeon: Aaron Kam M.D.;  Location: [facility];  Service: Gastroenterology   GASTROSCOPY [44.13] N/A 5/30/2020 Procedure: GASTROSCOPY;  Surgeon: Waylon Mcqueen M.D.;  Location: [facility];  Service: Gastroenterology   GASTROSCOPY-ENDO [E1.10] Not specified 9/18/2020 Procedure: GASTROSCOPY;  Surgeon: Gadiel Whitney M.D.;  Location: [facility];  Service: Gastroenterology   DC UPPER GI ENDOSCOPY,CTRL BLEED [97558] Not specified " 11/12/2020 Procedure: GASTROSCOPY, WITH ARGON PLASMA COAGULATION;  Surgeon: Gadiel Whitney M.D.;  Location: SURGERY [facility];  Service: Gastroenterology   NE UPPER GI ENDOSCOPY,BIOPSY [[id number]] Not specified 11/12/2020 Procedure: GASTROSCOPY, WITH BIOPSY;  Surgeon: Gadiel Whitney M.D.;  Location: SURGERY [facility];  Service: Gastroenterology   GASTROSCOPY-ENDO [E1.10] Not specified 11/12/2020 Procedure: GASTROSCOPY;  Surgeon: Gadiel Whitney M.D.;  Location: SURGERY [facility];  Service: Gastroenterology   Social History    Tobacco History    Smoking Status   Never Smoker   Smokeless Tobacco Use   Never Used   Alcohol History    Alcohol Use Status   No   Drug Use    Drug Use Status   No         Physical Exam    Airway   Mallampati: IV  TM distance: >3 FB  Neck ROM: full       Cardiovascular - normal exam  Rhythm: regular  Rate: normal  (-) murmur     Dental - normal exam           Pulmonary - normal exam  Breath sounds clear to auscultation     Abdominal    Neurological - normal exam               Anesthesia Plan    ASA 3 (SLE, chronic steroid tx, acute/severe blood loss anemia)   ASA physical status 3 criteria: ESRD undergoing regularly scheduled dialysis and other (comment)    Plan - MAC           Plan Factors:   Patient was not previously instructed to abstain from smoking on day of procedure.  Patient did not smoke on day of procedure.      Induction: intravenous    Postoperative Plan: Postoperative administration of opioids is intended.    Pertinent diagnostic labs and testing reviewed    Informed Consent:    Anesthetic plan and risks discussed with patient.    Use of blood products discussed with: patient whom consented to blood products.

## 2021-01-08 NOTE — PROGRESS NOTES
[facility] Medicine Daily Progress Note    Date of Service  1/8/2021    Chief Complaint  25 y.o. female admitted 1/6/2021 with anemia and hematemesis.    Hospital Course  Pt is a 26yo female with a history of ESRD and lupus with chronic anemia and frequent transfusions who was sent to hospital from HD due to a hemoglobin of 4.6.  Patient presented to the ED and was largely asymptomatic, however, after arrival, she did have an episode of hematemesis. She had recently been admitted for UGIB with hemorrhagic gastritis seen on EGD.  She was admitted to the ICU, transfused, and was stabilized for transfer to the floor.     Interval Problem Update  1/8 - Pt to have EGD and colonoscopy today, states that she had an episode of hematemesis last night while drinking prep, but nothing since then. She is currently receiving dialysis.  Platelet count continues to be variable, better today at 47, was 38 the day prior. States that her abdominal pain is better today. Vital signs are currently stable.     Consultants/Specialty  GI  ICU  Heme Onc    Code Status  Full Code    Disposition  Continued hospitalization    Review of Systems  Review of Systems   Constitutional: Negative for chills and fever.   Respiratory: Negative for cough, hemoptysis and shortness of breath.    Cardiovascular: Negative for chest pain and leg swelling.   Gastrointestinal: Positive for vomiting. Negative for abdominal pain.        Hematemesis      Neurological: Negative for dizziness.   All other systems reviewed and are negative.       Physical Exam  Temp:  [36.3 °C (97.3 °F)-37.1 °C (98.7 °F)] 36.4 °C (97.6 °F)  Pulse:  [64-88] 64  Resp:  [13-18] 18  BP: (131-161)/() 131/72  SpO2:  [94 %-100 %] 100 %    Physical Exam  Vitals signs and nursing note reviewed.   Constitutional:       Appearance: She is ill-appearing. She is not toxic-appearing.   HENT:      Head: Normocephalic and atraumatic.   Cardiovascular:      Rate and Rhythm: Normal rate and  regular rhythm.   Pulmonary:      Effort: Pulmonary effort is normal. No respiratory distress.      Breath sounds: Normal breath sounds. No wheezing.   Abdominal:      General: Abdomen is flat. Bowel sounds are normal. There is no distension.      Palpations: Abdomen is soft.      Tenderness: There is no abdominal tenderness. There is no guarding or rebound.   Musculoskeletal:         General: No swelling.   Skin:     General: Skin is warm and dry.   Neurological:      Mental Status: She is alert and oriented to person, place, and time.   Psychiatric:         Behavior: Behavior normal.         Fluids    Intake/Output Summary (Last 24 hours) at 1/8/2021 1035  Last data filed at 1/8/2021 0200  Gross per 24 hour   Intake 600.25 ml   Output 2 ml   Net 598.25 ml       Laboratory  Recent Labs     01/06/21 1950 01/07/21 0500 01/07/21 2036 01/08/21  0456   WBC 4.3* 3.5*  --  4.1*   RBC 1.55* 2.21*  --  2.24*   HEMOGLOBIN 4.6* 6.7* 6.9* 6.7*   HEMATOCRIT 15.8* 21.2* 22.1* 21.4*   .3* 95.9  --  95.5   MCH 29.0 30.3  --  29.9   MCHC 28.7* 31.6*  --  31.3*   RDW 59.9* 54.4*  --  57.3*   PLATELETCT 47* 38*  --  47*   MPV 12.7 12.9  --  13.1*     Recent Labs     01/06/21 1950 01/07/21 0500 01/08/21 0456   SODIUM 134* 133* 130*   POTASSIUM 4.4 4.8 4.7   CHLORIDE 91* 95* 91*   CO2 32 32 30   GLUCOSE 96 86 84   BUN 25* 31* 44*   CREATININE 3.35* 4.16* 5.52*   CALCIUM 8.6 8.1* 8.6     Recent Labs     01/06/21 1950   APTT 28.9   INR 1.16*               Imaging  DX-CHEST-PORTABLE (1 VIEW)   Final Result      Resolved consolidation with stable cardiac silhouette enlargement           Assessment/Plan  Acute on chronic anemia- (present on admission)  Assessment & Plan  - Multifactorial: End-stage renal disease, lupus, Plaquenil, thrombocytopenia, hematemesis  - S/p transfusion, but we are attempting to limit transfusions unless absolutely necessary - pt is already antibody positive, and is on the kidney transplant list  -  Follow with hematology Dr Garcia as outpatient; discussed with Dr Ortiz 1/8 - agreed with avoiding transfusion unless absolutely necessary, ordering fibrinogen, peripheral smear, LDH and Haptoglobin.  - Epogen per Nephrology     Hematemesis  Assessment & Plan  - Pt with hematemesis in November as well, was scoped 11/12/20 - found to haveemorrhagic gastritis - not actively bleeding but there were several sites that appear to be at risk of further bleeding. She was treated with APC at that time.  - Thrombocytopenia likely contributing to continued bleeding, workup pending  - To have EGD and colonoscopy today, continue BID PPI, appreciate GI      Lupus (HCC)- (present on admission)  Assessment & Plan  Resumed outpatient medications (plaquenil, prednisone)  Follow-up with rheumatology    ESRD (end stage renal disease) on dialysis (HCC)- (present on admission)  Assessment & Plan  - Secondary to lupus nephritis, is on the transplant list  - Receiving HD today       Renovascular hypertension  Assessment & Plan  Continue Norvasc, Atenolol    Hyponatremia- (present on admission)  Assessment & Plan  - Per Nephrology     Thrombocytopenia (HCC)- (present on admission)  Assessment & Plan  - Discussed with Dr Ortiz, has had some degree of thrombocytopenia for awhile, acutely worsened  - Peripheral smear, LDH, haptoglobin, fibrinogen ordered and pending  - Attempting to limit transfusion due to antibodies, hx of TRALI and pt's status on the transplant list         VTE prophylaxis: SCDs

## 2021-01-08 NOTE — ASSESSMENT & PLAN NOTE
- Discussed with Dr Ortiz, has had some degree of thrombocytopenia for awhile, acutely worsened  - LDH and Fibrinogen essentially normal  - Likely secondary to lupus   - Attempting to limit transfusion due to antibodies, hx of TRALI and pt's status on the transplant list

## 2021-01-09 ENCOUNTER — APPOINTMENT (OUTPATIENT)
Dept: ONCOLOGY | Facility: MEDICAL CENTER | Age: 26
End: 2021-01-09
Attending: INTERNAL MEDICINE
Payer: COMMERCIAL

## 2021-01-09 VITALS
HEART RATE: 85 BPM | RESPIRATION RATE: 18 BRPM | SYSTOLIC BLOOD PRESSURE: 166 MMHG | TEMPERATURE: 98.4 F | HEIGHT: 67 IN | OXYGEN SATURATION: 94 % | WEIGHT: 131.17 LBS | BODY MASS INDEX: 20.59 KG/M2 | DIASTOLIC BLOOD PRESSURE: 101 MMHG

## 2021-01-09 PROBLEM — K29.71 HEMORRHAGIC GASTRITIS: Status: ACTIVE | Noted: 2019-12-09

## 2021-01-09 LAB
ALBUMIN SERPL BCP-MCNC: 2.7 G/DL (ref 3.2–4.9)
ALBUMIN/GLOB SERPL: 0.6 G/DL
ALP SERPL-CCNC: 64 U/L (ref 30–99)
ALT SERPL-CCNC: 17 U/L (ref 2–50)
ANION GAP SERPL CALC-SCNC: 10 MMOL/L (ref 7–16)
AST SERPL-CCNC: 29 U/L (ref 12–45)
BILIRUB SERPL-MCNC: 0.3 MG/DL (ref 0.1–1.5)
BUN SERPL-MCNC: 22 MG/DL (ref 8–22)
CALCIUM SERPL-MCNC: 8.6 MG/DL (ref 8.4–10.2)
CHLORIDE SERPL-SCNC: 95 MMOL/L (ref 96–112)
CO2 SERPL-SCNC: 28 MMOL/L (ref 20–33)
CREAT SERPL-MCNC: 4.45 MG/DL (ref 0.5–1.4)
ERYTHROCYTE [DISTWIDTH] IN BLOOD BY AUTOMATED COUNT: 58.1 FL (ref 35.9–50)
GLOBULIN SER CALC-MCNC: 4.2 G/DL (ref 1.9–3.5)
GLUCOSE SERPL-MCNC: 84 MG/DL (ref 65–99)
HCT VFR BLD AUTO: 22.3 % (ref 37–47)
HCT VFR BLD AUTO: 24.1 % (ref 37–47)
HCT VFR BLD AUTO: NORMAL % (ref 37–47)
HGB BLD-MCNC: 6.8 G/DL (ref 12–16)
HGB BLD-MCNC: 7.4 G/DL (ref 12–16)
HGB BLD-MCNC: NORMAL G/DL (ref 12–16)
MCH RBC QN AUTO: 29.7 PG (ref 27–33)
MCHC RBC AUTO-ENTMCNC: 30.5 G/DL (ref 33.6–35)
MCV RBC AUTO: 97.4 FL (ref 81.4–97.8)
PLATELET # BLD AUTO: 53 K/UL (ref 164–446)
PMV BLD AUTO: 11.8 FL (ref 9–12.9)
POTASSIUM SERPL-SCNC: 4.6 MMOL/L (ref 3.6–5.5)
PROT SERPL-MCNC: 6.9 G/DL (ref 6–8.2)
RBC # BLD AUTO: 2.29 M/UL (ref 4.2–5.4)
SODIUM SERPL-SCNC: 133 MMOL/L (ref 135–145)
WBC # BLD AUTO: 4.7 K/UL (ref 4.8–10.8)

## 2021-01-09 PROCEDURE — 36415 COLL VENOUS BLD VENIPUNCTURE: CPT

## 2021-01-09 PROCEDURE — A9270 NON-COVERED ITEM OR SERVICE: HCPCS | Performed by: INTERNAL MEDICINE

## 2021-01-09 PROCEDURE — 80053 COMPREHEN METABOLIC PANEL: CPT

## 2021-01-09 PROCEDURE — 700111 HCHG RX REV CODE 636 W/ 250 OVERRIDE (IP): Performed by: INTERNAL MEDICINE

## 2021-01-09 PROCEDURE — 85018 HEMOGLOBIN: CPT | Mod: 91

## 2021-01-09 PROCEDURE — 99239 HOSP IP/OBS DSCHRG MGMT >30: CPT | Performed by: FAMILY MEDICINE

## 2021-01-09 PROCEDURE — 700102 HCHG RX REV CODE 250 W/ 637 OVERRIDE(OP): Performed by: INTERNAL MEDICINE

## 2021-01-09 PROCEDURE — 85014 HEMATOCRIT: CPT

## 2021-01-09 PROCEDURE — C9113 INJ PANTOPRAZOLE SODIUM, VIA: HCPCS | Performed by: INTERNAL MEDICINE

## 2021-01-09 PROCEDURE — 85027 COMPLETE CBC AUTOMATED: CPT

## 2021-01-09 RX ORDER — ONDANSETRON 4 MG/1
4 TABLET, ORALLY DISINTEGRATING ORAL EVERY 4 HOURS PRN
Qty: 20 TAB | Refills: 0 | Status: ON HOLD | OUTPATIENT
Start: 2021-01-09 | End: 2021-02-18 | Stop reason: SDUPTHER

## 2021-01-09 RX ORDER — SUCRALFATE ORAL 1 G/10ML
1 SUSPENSION ORAL
Qty: 1200 ML | Refills: 1 | Status: ON HOLD | OUTPATIENT
Start: 2021-01-09 | End: 2021-03-20 | Stop reason: SDUPTHER

## 2021-01-09 RX ORDER — PANTOPRAZOLE SODIUM 40 MG/1
40 TABLET, DELAYED RELEASE ORAL 2 TIMES DAILY
Qty: 60 TAB | Refills: 0 | Status: ON HOLD | OUTPATIENT
Start: 2021-01-09 | End: 2021-03-20 | Stop reason: SDUPTHER

## 2021-01-09 RX ADMIN — SUCRALFATE 1 G: 1 SUSPENSION ORAL at 00:36

## 2021-01-09 RX ADMIN — SUCRALFATE 1 G: 1 SUSPENSION ORAL at 05:55

## 2021-01-09 RX ADMIN — PANTOPRAZOLE SODIUM 40 MG: 40 INJECTION, POWDER, LYOPHILIZED, FOR SOLUTION INTRAVENOUS at 17:13

## 2021-01-09 RX ADMIN — ONDANSETRON 4 MG: 2 INJECTION INTRAMUSCULAR; INTRAVENOUS at 06:13

## 2021-01-09 RX ADMIN — SUCRALFATE 1 G: 1 SUSPENSION ORAL at 17:09

## 2021-01-09 RX ADMIN — OXYCODONE HYDROCHLORIDE AND ACETAMINOPHEN 1 TABLET: 5; 325 TABLET ORAL at 06:12

## 2021-01-09 RX ADMIN — ATENOLOL 25 MG: 25 TABLET ORAL at 17:12

## 2021-01-09 RX ADMIN — SUCRALFATE 1 G: 1 SUSPENSION ORAL at 12:36

## 2021-01-09 RX ADMIN — HYDROXYCHLOROQUINE SULFATE 200 MG: 200 TABLET, FILM COATED ORAL at 17:13

## 2021-01-09 RX ADMIN — AMLODIPINE BESYLATE 10 MG: 5 TABLET ORAL at 17:10

## 2021-01-09 RX ADMIN — SENNOSIDES-DOCUSATE SODIUM TAB 8.6-50 MG 2 TABLET: 8.6-5 TAB at 17:09

## 2021-01-09 RX ADMIN — PANTOPRAZOLE SODIUM 40 MG: 40 INJECTION, POWDER, LYOPHILIZED, FOR SOLUTION INTRAVENOUS at 05:56

## 2021-01-09 RX ADMIN — FUROSEMIDE 40 MG: 40 TABLET ORAL at 05:55

## 2021-01-09 ASSESSMENT — ENCOUNTER SYMPTOMS
ABDOMINAL PAIN: 0
EYES NEGATIVE: 1
CARDIOVASCULAR NEGATIVE: 1
FEVER: 0
NEUROLOGICAL NEGATIVE: 1
COUGH: 0
MUSCULOSKELETAL NEGATIVE: 1
SHORTNESS OF BREATH: 0
VOMITING: 0
CONSTITUTIONAL NEGATIVE: 1
RESPIRATORY NEGATIVE: 1
GASTROINTESTINAL NEGATIVE: 1
CHILLS: 0
DIZZINESS: 0
PSYCHIATRIC NEGATIVE: 1
HEMOPTYSIS: 0

## 2021-01-09 NOTE — PROGRESS NOTES
Spoke to Dr. Contreras regarding pt's request to go home today. MD stated pt can later this afternoon as long as her lab result H/H is stable. Will notify attending MD. Charge Rn aware.

## 2021-01-09 NOTE — ANESTHESIA POSTPROCEDURE EVALUATION
Patient: Lily Nicole    Procedure Summary     Date: 01/08/21 Room / Location:  ENDOSCOPIC ULTRASOUND ROOM / SURGERY [facility]    Anesthesia Start: 1444 Anesthesia Stop: 1544    Procedures:       COLONOSCOPY      GASTROSCOPY      GASTROSCOPY, WITH ARGON PLASMA COAGULATION Diagnosis: (Anemia and GI bleed)    Surgeons: Herbert Contreras M.D. Responsible Provider: Shameka Matta M.D.    Anesthesia Type: MAC ASA Status: 2          Final Anesthesia Type: MAC  Last vitals  BP   Blood Pressure: 114/64    Temp   36.6 °C (97.9 °F)    Pulse   Pulse: 78   Resp   16    SpO2   100 %      Anesthesia Post Evaluation    Patient location during evaluation: PACU  Patient participation: complete - patient participated  Level of consciousness: awake and alert  Pain score: 5    Airway patency: patent  Anesthetic complications: no  Cardiovascular status: hemodynamically stable  Respiratory status: acceptable  Hydration status: euvolemic  Comments: Cramping pelvic pain    PONV: none           Nurse Pain Score: 5 (NPRS)

## 2021-01-09 NOTE — PROGRESS NOTES
[facility] Medicine Daily Progress Note    Date of Service  1/9/2021    Chief Complaint  25 y.o. female admitted 1/6/2021 with anemia and hematemesis.    Hospital Course  Pt is a 26yo female with a history of ESRD and lupus with chronic anemia and frequent transfusions who was sent to hospital from HD due to a hemoglobin of 4.6.  Patient presented to the ED and was largely asymptomatic, however, after arrival, she did have an episode of hematemesis. She had recently been admitted for UGIB with hemorrhagic gastritis seen on EGD.  She was admitted to the ICU, transfused, and was stabilized for transfer to the floor.     Interval Problem Update  1/8 - Pt to have EGD and colonoscopy today, states that she had an episode of hematemesis last night while drinking prep, but nothing since then. She is currently receiving dialysis.  Platelet count continues to be variable, better today at 47, was 38 the day prior. States that her abdominal pain is better today. Vital signs are currently stable.     1/9 - PT s/p EGD and colonoscopy 1/8, with hemorrhagic gastritis again seen on scoping. Hemoglobin and platelet count remain stable, LDH and fibrinogen essentially normal. Pt to continue PPI and carafate added.  Will monitor overnight for any repeat episodes of hematemesis after her APC and RFA treatments during EGD; if hemoglobin/platelets stable, and no further hematemesis, pt can likely dc in the am. Pt currently on CLD, will advance to fulls, and if she tolerates, advance further for dinner.    Consultants/Specialty  GI  ICU  Heme Onc    Code Status  Full Code    Disposition  Continued hospitalization - discharge in 1-2 days    Review of Systems  Review of Systems   Constitutional: Negative for chills and fever.   Respiratory: Negative for cough, hemoptysis and shortness of breath.    Cardiovascular: Negative for chest pain and leg swelling.   Gastrointestinal: Negative for abdominal pain and vomiting.   Neurological: Negative  for dizziness.   All other systems reviewed and are negative.       Physical Exam  Temp:  [36.4 °C (97.6 °F)-37.2 °C (98.9 °F)] 36.8 °C (98.3 °F)  Pulse:  [64-99] 94  Resp:  [16-20] 18  BP: (114-164)/(61-92) 159/92  SpO2:  [91 %-100 %] 98 %    Physical Exam  Vitals signs and nursing note reviewed.   Constitutional:       Appearance: She is not toxic-appearing.   HENT:      Head: Normocephalic and atraumatic.   Cardiovascular:      Rate and Rhythm: Normal rate and regular rhythm.   Pulmonary:      Effort: Pulmonary effort is normal. No respiratory distress.      Breath sounds: Normal breath sounds. No wheezing.   Abdominal:      General: Abdomen is flat. Bowel sounds are normal. There is no distension.      Palpations: Abdomen is soft.      Tenderness: There is no abdominal tenderness. There is no guarding or rebound.   Musculoskeletal:         General: No swelling.   Skin:     General: Skin is warm and dry.   Neurological:      Mental Status: She is alert and oriented to person, place, and time.   Psychiatric:         Behavior: Behavior normal.         Fluids    Intake/Output Summary (Last 24 hours) at 1/9/2021 0623  Last data filed at 1/8/2021 1539  Gross per 24 hour   Intake 600 ml   Output 3600 ml   Net -3000 ml       Laboratory  Recent Labs     01/07/21  0500 01/07/21 2036 01/08/21 0456 01/09/21  0508   WBC 3.5*  --  4.1* 4.7*   RBC 2.21*  --  2.24* 2.29*   HEMOGLOBIN 6.7* 6.9* 6.7* 6.8*   HEMATOCRIT 21.2* 22.1* 21.4* 22.3*   MCV 95.9  --  95.5 97.4   MCH 30.3  --  29.9 29.7   MCHC 31.6*  --  31.3* 30.5*   RDW 54.4*  --  57.3* 58.1*   PLATELETCT 38*  --  47* 53*   MPV 12.9  --  13.1* 11.8     Recent Labs     01/07/21  0500 01/08/21 0456 01/09/21  0508   SODIUM 133* 130* 133*   POTASSIUM 4.8 4.7 4.6   CHLORIDE 95* 91* 95*   CO2 32 30 28   GLUCOSE 86 84 84   BUN 31* 44* 22   CREATININE 4.16* 5.52* 4.45*   CALCIUM 8.1* 8.6 8.6     Recent Labs     01/06/21  1950 01/08/21  1106   APTT 28.9  --    INR 1.16* 1.01                Imaging  DX-CHEST-PORTABLE (1 VIEW)   Final Result      Resolved consolidation with stable cardiac silhouette enlargement           Assessment/Plan  Acute on chronic anemia  Assessment & Plan  - Multifactorial: End-stage renal disease, lupus, Plaquenil, thrombocytopenia, hematemesis  - S/p transfusion, but we are attempting to limit transfusions unless absolutely necessary - pt is already antibody positive, and is on the kidney transplant list  - Follow with hematology Dr Garcia as outpatient; discussed with Dr Ortiz 1/8   - LDH and Fibrinogen essentially normal  - Hemoglobin stable today - monitor overnight  - Epogen per Nephrology     Hemorrhagic gastritis with hematemesis   Assessment & Plan  - Pt with hematemesis in November as well, was scoped 11/12/20 - found to haveemorrhagic gastritis treated with APC  - Thrombocytopenia likely contributing to continued bleeding, LDH and fibrinogen nml  - Colonoscopy 1/8 without acute findings, EGD with hemorrhagic gastritis treated with APC and RFA, carafate added to PPI  - monitor overnight - if no further episodes, can dc in the am  - Advance to FLD, if she tolerates, advance further for dinner      Lupus (HCC)  Assessment & Plan  Resumed outpatient medications (plaquenil, prednisone)  Follow-up with rheumatology    ESRD (end stage renal disease) on dialysis (HCC)  Assessment & Plan  - Secondary to lupus nephritis, is on the transplant list  - HD MWF      Renovascular hypertension  Assessment & Plan  Continue Norvasc, Atenolol    Hyponatremia  Assessment & Plan  - Per Nephrology     Thrombocytopenia (HCC)  Assessment & Plan  - Discussed with Dr Ortiz, has had some degree of thrombocytopenia for awhile, acutely worsened  - LDH and Fibrinogen essentially normal  - Likely secondary to lupus   - Attempting to limit transfusion due to antibodies, hx of TRALI and pt's status on the transplant list         VTE prophylaxis: SCDs

## 2021-01-09 NOTE — DISCHARGE SUMMARY
Discharge Summary    CHIEF COMPLAINT ON ADMISSION  Chief Complaint   Patient presents with   • Abnormal Labs     Pt had transfusion scheduled for 9th, was called by transfusion center today and told to come to ED for to Hgb of 4.9. Pt denies lightheadedness, sob. Pt pale.        Reason for Admission  abnormal labs,      Admission Date  1/6/2021    CODE STATUS  Full Code    HPI & HOSPITAL COURSE  This is a 25 y.o. female with a history of lupus with associated ESRD from lupus nephritis and chronic anemia with frequent transfusions who presented to hospital with anemia (Hgb 4.9) and hematemesis.  She had been previously admitted two months ago with hemorrhagic gastritis seen on EGD, and she underwent scoping again this stay that again demonstrated hemorrhagic gastritis, treated with APC and RFA. She was transfused 2u PRBC. She also has thrombocytopenia, acute on chronic, but fibrogen and LDH were normal.  We have transfused her conservatively as she has already has antibodies to blood products and is on the transplant list for her ERSD.      The patient has not had any hematemesis in almost 48 hours, and is tolerating a FLD.  She is very anxious to be discharged today. Repeat hemoglobin this afternoon is higher than this morning at 7.4, and pt is hemodynamically stable, therefore, she is stable for discharge. She is to follow up with GI in two weeks, and Dr Garcia (whom she is known to) in one week. She has instructions to return to hospital if symptoms recur.      Therefore, she is discharged in fair and stable condition to home with close outpatient follow-up.    The patient met 2-midnight criteria for an inpatient stay at the time of discharge.    Discharge Date  1/9/21    FOLLOW UP ITEMS POST DISCHARGE  None    DISCHARGE DIAGNOSES  Active Problems:    * No active hospital problems. *  Resolved Problems:    * No resolved hospital problems. *      FOLLOW UP  Future Appointments   Date Time Provider Department  [place]   1/21/2021 11:30 AM INFUSION QUICK INJECT ON[facility]   1/23/2021  9:45 [facility]     Herbert Contreras M.D.  [address]  [place] [address]  [phone]    In 2 weeks      Ella Matthew M.D.  [address]  [facility] [address]  [place] [address]  [phone]    In 1 week  For CBC    Austyn Garcia III, M.D.  [address]  [place] [address]  [phone]    In 1 week        MEDICATIONS ON DISCHARGE     Medication List      START taking these medications      Instructions   pantoprazole 40 MG Tbec  Commonly known as: PROTONIX   Take 1 Tab by mouth 2 times a day.  Dose: 40 mg     sucralfate 1 GM/10ML Susp  Commonly known as: CARAFATE   Doctor's comments: Can give tablets at same dosage if pt unable to afford liquid preparation, quantity sufficient for one month  Take 10 mL by mouth 4 Times a Day,Before Meals and at Bedtime.  Dose: 1 g        CHANGE how you take these medications      Instructions   ondansetron 4 MG Tbdp  What changed: reasons to take this  Commonly known as: ZOFRAN ODT   Take 1 Tab by mouth every four hours as needed for Nausea (give PO if no IV route available).  Dose: 4 mg        CONTINUE taking these medications      Instructions   acetaminophen 500 MG Tabs  Commonly known as: TYLENOL   Take 500 mg by mouth every 6 hours as needed for Moderate Pain.  Dose: 500 mg     amLODIPine 10 MG Tabs  Commonly known as: NORVASC   Take 10 mg by mouth every evening.  Dose: 10 mg     atenolol 25 MG Tabs  Commonly known as: TENORMIN   Take 25 mg by mouth every evening.  Dose: 25 mg     hydroxychloroquine 200 MG Tabs  Commonly known as: Plaquenil   Take 200 mg by mouth every evening.  Dose: 200 mg     LASIX PO   Take  by mouth every day.     mycophenolate 180 MG EC tablet  Commonly known as: Myfortic   Take 1 Tab by mouth every evening.  Dose: 180 mg     predniSONE 5 MG Tabs  Commonly known as: DELTASONE   Take 5 mg by mouth every evening.  Dose: 5 mg        STOP taking these  medications    omeprazole 20 MG delayed-release capsule  Commonly known as: PRILOSEC     senna-docusate 8.6-50 MG Tabs  Commonly known as: PERICOLACE or SENOKOT S            Allergies  Allergies   Allergen Reactions   • Clindamycin Rash     Hive   • Keflex Rash     Hives   • Methylprednisolone      Anxious;   • Metoprolol Nausea       DIET  Orders Placed This Encounter   Procedures   • Diet Order Diet: Full Liquid     Standing Status:   Standing     Number of Occurrences:   1     Order Specific Question:   Diet:     Answer:   Full Liquid [11]       ACTIVITY  As tolerated.  Weight bearing as tolerated    CONSULTATIONS  Critical care  Nephrology  GI  Heme Onc    PROCEDURES  EGD and colonoscopy    LABORATORY  Lab Results   Component Value Date    SODIUM 133 (L) 01/09/2021    POTASSIUM 4.6 01/09/2021    CHLORIDE 95 (L) 01/09/2021    CO2 28 01/09/2021    GLUCOSE 84 01/09/2021    BUN 22 01/09/2021    CREATININE 4.45 (H) 01/09/2021        Lab Results   Component Value Date    WBC 4.7 (L) 01/09/2021    HEMOGLOBIN 7.4 (L) 01/09/2021    HEMATOCRIT 24.1 (L) 01/09/2021    PLATELETCT 53 (L) 01/09/2021        Total time of the discharge process exceeds 38 minutes.

## 2021-01-09 NOTE — PROGRESS NOTES
Melina from lab called stating that H/H order for pt will be redrawn d/t result is coming from same tube that was previously drawn.

## 2021-01-09 NOTE — PROGRESS NOTES
Gastroenterology Progress Note     Author: Herbert Contreras M.D.   Date & Time Created: 1/9/2021 11:05 AM    Chief Complaint:  She is a 25-year-old female who is seen in consultation for anemia and hematemesis.  Yesterday the patient presented to the hospital after recommendations from a transfusion center to be seen with a hemoglobin outpatient at 4.9.  Upon evaluation of the emergency room she was found to have hemoglobin 4.6.  She complained of generalized weakness and nausea, but no abdominal pain.  During her time in the emergency room she vomited 150 cc of maroon blood.  She has had no further vomiting, melena, hematochezia since yesterday.     The patient has a significant past medical history including lupus with subsequent lupus nephritis resulting in end-stage renal disease on hemodialysis Monday, Wednesday, Friday, followed by Dr. Trent.  She is on the transplant list and sees [facility] for her transplant work-up.  She has received a work-up in the past from Dr. Garcia with hematology and has not been found to have any bleeding disorders or bone marrow issues.  She is well-known to our practice with repeated admissions for upper gastrointestinal bleeding related to oozing gastropathy caused by lupus and her kidney issues, the last in November with nonbleeding gastropathy, but several areas that looked high risk to begin to bleed..  She has received numerous endoscopies with ablation of bleeding areas in her stomach.  She has never had a colonoscopy.  She was having some diarrhea back in May of this year and states that she does have on and off diarrhea at times.  She does not notice bright red blood in her stool.      She received 2 units of blood and current hemoglobin 6.7.  She is hemodynamically stable.  She has experienced mild shortness of breath and has been screened for Covid, currently pending.       Interval History:  1/8/2021: Hemorrhagic gastritis likely the source of patient's anemia.  Location  cardiac near the GE junction as well as antrum.  Hemostasis and ablation performed with radiofrequency ablation (Barrxx) and argon plasma coagulation.  There was no focal spot of bleeding just oozing gastropathy. Suboptimal prep throughout the entire colon with difficulty visualization (due to melenic liquid material) however no large lesion was seen and no active fresh blood noted.  Suspect upper GI source  1/9/2021: reports feeling well. No melena overnight. H/H stable. Eager to go home.    Review of Systems:  Review of Systems   Constitutional: Negative.    HENT: Negative.    Eyes: Negative.    Respiratory: Negative.    Cardiovascular: Negative.    Gastrointestinal: Negative.    Genitourinary: Negative.    Musculoskeletal: Negative.    Skin: Negative.    Neurological: Negative.    Endo/Heme/Allergies: Negative.    Psychiatric/Behavioral: Negative.        Physical Exam:  Physical Exam  Constitutional:       Appearance: Normal appearance.   HENT:      Head: Normocephalic and atraumatic.      Nose: Nose normal.      Mouth/Throat:      Mouth: Mucous membranes are moist.   Eyes:      Extraocular Movements: Extraocular movements intact.      Pupils: Pupils are equal, round, and reactive to light.   Neck:      Musculoskeletal: Normal range of motion and neck supple.   Cardiovascular:      Rate and Rhythm: Normal rate and regular rhythm.      Pulses: Normal pulses.      Heart sounds: Murmur present.   Pulmonary:      Effort: Pulmonary effort is normal.      Breath sounds: Normal breath sounds.   Abdominal:      General: Abdomen is flat.      Palpations: Abdomen is soft.   Skin:     General: Skin is warm and dry.   Neurological:      Mental Status: She is alert.   Psychiatric:         Mood and Affect: Mood normal.         Behavior: Behavior normal.         Labs:          Recent Labs     01/07/21  0500 01/08/21  0456 01/09/21  0508   SODIUM 133* 130* 133*   POTASSIUM 4.8 4.7 4.6   CHLORIDE 95* 91* 95*   CO2 32 30 28   BUN  31* 44* 22   CREATININE 4.16* 5.52* 4.45*   CALCIUM 8.1* 8.6 8.6     Recent Labs     21  0500 21  0456 21  0508   ALTSGPT 28 20 17   ASTSGOT 67* 37 29   ALKPHOSPHAT 73 64 64   TBILIRUBIN 0.4 0.4 0.3   GLUCOSE 86 84 84     Recent Labs     21  1948 21  0500 21  2036 21  0456 21  1106 21  0508   RBC 1.55* 2.21*  --  2.24*  --  2.29*   HEMOGLOBIN 4.6* 6.7* 6.9* 6.7*  --  6.8*   HEMATOCRIT 15.8* 21.2* 22.1* 21.4*  --  22.3*   PLATELETCT 47* 38*  --  47*  --  53*   PROTHROMBTM 14.5  --   --   --  13.0  --    APTT 28.9  --   --   --   --   --    INR 1.16*  --   --   --  1.01  --    IRON 39*  --   --   --   --   --    FERRITIN 367.0*  --   --   --   --   --    TOTIRONBC 163*  --   --   --   --   --      Recent Labs     21  1948 21  0500 21  0456 21  0508   WBC 4.3* 3.5* 4.1* 4.7*   NEUTSPOLYS 72.30* 65.80 77.70*  --    LYMPHOCYTES 16.90* 21.70* 13.00*  --    MONOCYTES 8.90 10.50 7.20  --    EOSINOPHILS 0.20 0.30 1.40  --    BASOPHILS 1.20 1.10 0.50  --    ASTSGOT 29 67* 37 29   ALTSGPT 11 28 20 17   ALKPHOSPHAT 70 73 64 64   TBILIRUBIN 0.3 0.4 0.4 0.3     Hemodynamics:  Temp (24hrs), Av.8 °C (98.3 °F), Min:36.6 °C (97.9 °F), Max:37.2 °C (98.9 °F)  Temperature: 36.8 °C (98.3 °F)  Pulse  Av.8  Min: 62  Max: 114   Blood Pressure: 159/92     Respiratory:    Respiration: 18, Pulse Oximetry: 98 %           Fluids:    Intake/Output Summary (Last 24 hours) at 2021 1105  Last data filed at 2021 1539  Gross per 24 hour   Intake 200 ml   Output 100 ml   Net 100 ml        GI/Nutrition:  Orders Placed This Encounter   Procedures   • Diet Order Diet: Full Liquid     Standing Status:   Standing     Number of Occurrences:   1     Order Specific Question:   Diet:     Answer:   Full Liquid [11]     Medical Decision Making, by Problem:  There are no active hospital problems to display for this patient.    25-year-old female with evidence of profound  anemia, one episode hematemesis last night  She has received multiple endoscopic evaluation in the past with multiple ablations, but this does not seem to give her much improvement long-term in reducing admissions with anemia, fatigue, and sometimes GI bleeding. S/p EGD and colonoscopy on 1/8/2021. EGD showed morrhagic gastritis likely the source of patient's anemia.  Location cardiac near the GE junction as well as antrum.  Hemostasis and ablation performed with radiofrequency ablation (Barrxx) and argon plasma coagulation.  There was no focal spot of bleeding just oozing gastropathy. Suboptimal prep throughout the entire colon with difficulty visualization (due to melenic liquid material) however no large lesion was seen and no active fresh blood noted.  Suspect upper GI source. H/H stable       Assessment/Recommendations:  Problems:  1.  Acute on chronic anemia-secondary to kidney disease leading possibly to gastrointestinal bleeding.  S/p EGD as above, stable. No recurrence of anemia melena.   2.  Hematemesis - resolved     3.  End-stage renal disease- on Hemodiaylsis, last done 1/8/2021, possible lower BUN      4.  Lupus and resultant lupus nephritis     Plan:  1. Monitor H/H  2. PPI therapy BID for at least 2 weeks after ablation, then wean as tolerated.   3. Advance diet as tolerated  4. Return to hospital if rebleed/melena  5. Serial H/H monitoring during dialysis  6. Discharge planning as per team  7. Follow-up in clinic in 2-3 weeks.    GI TO SIGN OFF / STAND BY - Thank you very much for allowing me to participate in the care of your patient.  Please feel free to contact me anytime at [phone]      Quality-Core Measures

## 2021-01-09 NOTE — PROGRESS NOTES
"Pt AAOx4. Reports she wanted to go home today stated \"I am feeling anxious when I am in the hospital\" Pt tearful. Attending Md told her that she need 1 more night and pt is not happy about it. She said she wanted to talk to MD. Will update MD or GI doc on board. Otherwise, declines any pain, nausea, dizziness, or SOB.  Safety and comfort measures in place. Pt updated w/ new orders from MD and POC. Questions and concerns answered.  No additional needs at this time.  "

## 2021-01-10 NOTE — PROGRESS NOTES
Called Dr. Zapata to clarify d/c order for pt. Per MD note d/c date 1/9/21 but per dc order says 1/11/21. MD stated that pt is discharging tonight and not 1/11/21.

## 2021-01-10 NOTE — DISCHARGE INSTRUCTIONS
Anemia    Anemia is a condition in which you do not have enough red blood cells or hemoglobin. Hemoglobin is a substance in red blood cells that carries oxygen. When you do not have enough red blood cells or hemoglobin (are anemic), your body cannot get enough oxygen and your organs may not work properly. As a result, you may feel very tired or have other problems.  What are the causes?  Common causes of anemia include:  · Excessive bleeding. Anemia can be caused by excessive bleeding inside or outside the body, including bleeding from the intestine or from periods in women.  · Poor nutrition.  · Long-lasting (chronic) kidney, thyroid, and liver disease.  · Bone marrow disorders.  · Cancer and treatments for cancer.  · HIV (human immunodeficiency virus) and AIDS (acquired immunodeficiency syndrome).  · Treatments for HIV and AIDS.  · Spleen problems.  · Blood disorders.  · Infections, medicines, and autoimmune disorders that destroy red blood cells.  What are the signs or symptoms?  Symptoms of this condition include:  · Minor weakness.  · Dizziness.  · Headache.  · Feeling heartbeats that are irregular or faster than normal (palpitations).  · Shortness of breath, especially with exercise.  · Paleness.  · Cold sensitivity.  · Indigestion.  · Nausea.  · Difficulty sleeping.  · Difficulty concentrating.  Symptoms may occur suddenly or develop slowly. If your anemia is mild, you may not have symptoms.  How is this diagnosed?  This condition is diagnosed based on:  · Blood tests.  · Your medical history.  · A physical exam.  · Bone marrow biopsy.  Your health care provider may also check your stool (feces) for blood and may do additional testing to look for the cause of your bleeding.  You may also have other tests, including:  · Imaging tests, such as a CT scan or MRI.  · Endoscopy.  · Colonoscopy.  How is this treated?  Treatment for this condition depends on the cause. If you continue to lose a lot of blood, you may  need to be treated at a hospital. Treatment may include:  · Taking supplements of iron, vitamin B12, or folic acid.  · Taking a hormone medicine (erythropoietin) that can help to stimulate red blood cell growth.  · Having a blood transfusion. This may be needed if you lose a lot of blood.  · Making changes to your diet.  · Having surgery to remove your spleen.  Follow these instructions at home:  · Take over-the-counter and prescription medicines only as told by your health care provider.  · Take supplements only as told by your health care provider.  · Follow any diet instructions that you were given.  · Keep all follow-up visits as told by your health care provider. This is important.  Contact a health care provider if:  · You develop new bleeding anywhere in the body.  Get help right away if:  · You are very weak.  · You are short of breath.  · You have pain in your abdomen or chest.  · You are dizzy or feel faint.  · You have trouble concentrating.  · You have bloody or black, tarry stools.  · You vomit repeatedly or you vomit up blood.  Summary  · Anemia is a condition in which you do not have enough red blood cells or enough of a substance in your red blood cells that carries oxygen (hemoglobin).  · Symptoms may occur suddenly or develop slowly.  · If your anemia is mild, you may not have symptoms.  · This condition is diagnosed with blood tests as well as a medical history and physical exam. Other tests may be needed.  · Treatment for this condition depends on the cause of the anemia.  This information is not intended to replace advice given to you by your health care provider. Make sure you discuss any questions you have with your health care provider.  Document Released: 01/25/2006 Document Revised: 11/30/2018 Document Reviewed: 01/19/2018  [employer] Patient Education © 2020 [employer]      Gastrointestinal Bleeding  Gastrointestinal (GI) bleeding is bleeding somewhere along the path that food travels  through the body (digestive tract). This path is anywhere between the mouth and the opening of the butt (anus). You may have blood in your poop (stool) or have black poop. If you throw up (vomit), there may be blood in it.  This condition can be mild, serious, or even life-threatening. If you have a lot of bleeding, you may need to stay in the hospital.  What are the causes?  This condition may be caused by:  · Irritation and swelling of the esophagus (esophagitis). The esophagus is part of the body that moves food from your mouth to your stomach.  · Swollen veins in the butt (hemorrhoids).  · Areas of painful tearing in the opening of the butt (anal fissures). These are often caused by passing hard poop.  · Pouches that form on the colon over time (diverticulosis).  · Irritation and swelling (diverticulitis) in areas where pouches have formed on the colon.  · Growths (polyps) or cancer. Colon cancer often starts out as growths that are not cancer.  · Irritation of the stomach lining (gastritis).  · Sores (ulcers) in the stomach.  What increases the risk?  You are more likely to develop this condition if you:  · Have a certain type of infection in your stomach (Helicobacter pylori infection).  · Take certain medicines.  · Smoke.  · Drink alcohol.  What are the signs or symptoms?  Common symptoms of this condition include:  · Throwing up (vomiting) material that has bright red blood in it. It may look like coffee grounds.  · Changes in your poop. The poop may:  ? Have red blood in it.  ? Be black, look like tar, and smell stronger than normal.  ? Be red.  · Pain or cramping in the belly (abdomen).  How is this treated?  Treatment for this condition depends on the cause of the bleeding. For example:  · Sometimes, the bleeding can be stopped during a procedure that is done to find the problem (endoscopy or colonoscopy).  · Medicines can be used to:  ? Help control irritation, swelling, or infection.  ? Reduce acid in  your stomach.  · Certain problems can be treated with:  ? Creams.  ? Medicines that are put in the butt (suppositories).  ? Warm baths.  · Surgery is sometimes needed.  · If you lose a lot of blood, you may need a blood transfusion.  If bleeding is mild, you may be allowed to go home. If there is a lot of bleeding, you will need to stay in the hospital.  Follow these instructions at home:    · Take over-the-counter and prescription medicines only as told by your doctor.  · Eat foods that have a lot of fiber in them. These foods include beans, whole grains, and fresh fruits and vegetables. You can also try eating 1-3 prunes each day.  · Drink enough fluid to keep your pee (urine) pale yellow.  · Keep all follow-up visits as told by your doctor. This is important.  Contact a doctor if:  · Your symptoms do not get better.  Get help right away if:  · Your bleeding does not stop.  · You feel dizzy or you pass out (faint).  · You feel weak.  · You have very bad cramps in your back or belly.  · You pass large clumps of blood (clots) in your poop.  · Your symptoms are getting worse.  · You have chest pain or fast heartbeats.  Summary  · GI bleeding is bleeding somewhere along the path that food travels through the body (digestive tract).  · This bleeding can be caused by many things. Treatment depends on the cause of the bleeding.  · Take medicines only as told by your doctor.  · Keep all follow-up visits as told by your doctor. This is important.  This information is not intended to replace advice given to you by your health care provider. Make sure you discuss any questions you have with your health care provider.    Diet: Diet Order Diet: Full Liquid Advance as tolerated  Activity: As tolerated  Follow Up: Dr Contreras in 2-3 weeks, Dr Garcia in one week for CBC  Disposition: Home  Diagnosis: Hemorrhagic gastritis  Return to hospital immediately if you have any vomiting of blood or experience black stools  Follow up with  your Primary Care Provider Ella Matthew M.D. as scheduled or sooner if your symptoms persist or worsen.  Return to Emergency Room for severe chest pain, shortness of breath, signs of a stroke, or any other emergencies.      Discharge Instructions    Discharged to home by car with relative. Discharged via wheelchair, hospital escort: Yes.  Special equipment needed: Not Applicable    Be sure to schedule a follow-up appointment with your primary care doctor or any specialists as instructed.     Discharge Plan:      I understand that a diet low in cholesterol, fat, and sodium is recommended for good health. Unless I have been given specific instructions below for another diet, I accept this instruction as my diet prescription.   Other diet: Regular      Special Instructions: None    · Is patient discharged on Warfarin / Coumadin?   No     Depression / Suicide Risk    As you are discharged from this [facility] facility, it is important to learn how to keep safe from harming yourself.    Recognize the warning signs:  · Abrupt changes in personality, positive or negative- including increase in energy   · Giving away possessions  · Change in eating patterns- significant weight changes-  positive or negative  · Change in sleeping patterns- unable to sleep or sleeping all the time   · Unwillingness or inability to communicate  · Depression  · Unusual sadness, discouragement and loneliness  · Talk of wanting to die  · Neglect of personal appearance   · Rebelliousness- reckless behavior  · Withdrawal from people/activities they love  · Confusion- inability to concentrate     If you or a loved one observes any of these behaviors or has concerns about self-harm, here's what you can do:  · Talk about it- your feelings and reasons for harming yourself  · Remove any means that you might use to hurt yourself (examples: pills, rope, extension cords, firearm)  · Get professional help from the community (Mental Health, Substance  Abuse, psychological counseling)  · Do not be alone:Call your Safe Contact- someone whom you trust who will be there for you.  · Call your local CRISIS HOTLINE 357-8419 or 722-299-9020  · Call your local Children's Mobile Crisis Response Team [place] (609) 275-9079 or www.[employer]  · Call the toll free National Suicide Prevention Hotlines   · National Suicide Prevention Lifeline 077-767-TYSU (3441)  · National Hope Line Network 800-SUICIDE (012-9883)

## 2021-01-10 NOTE — PROGRESS NOTES
Pt discharged to home. Discharge instructions provided to pt. Pt, ambulating, voiding, and tolerating po meds, and full liquid diet w/o c/o nausea or vomiting. Pt verbalizes understanding. Pt states all questions have been answered. Signed copy in chart. Pt states that all personal belongings are in possession. Pt off unit via wheelchair, escorted by CNA.

## 2021-01-11 LAB — HAPTOGLOB SERPL-MCNC: 64 MG/DL (ref 30–200)

## 2021-01-19 ENCOUNTER — TELEPHONE (OUTPATIENT)
Dept: HEALTH INFORMATION MANAGEMENT | Facility: OTHER | Age: 26
End: 2021-01-19

## 2021-01-19 NOTE — TELEPHONE ENCOUNTER
ALESHIA call to patient. She reported doing very well, she had made her f/u apt with her PCP and has no current needs. Encouraged to contact [facility] and request a  should her needs change in the future.

## 2021-01-20 ENCOUNTER — TELEPHONE (OUTPATIENT)
Dept: ONCOLOGY | Facility: MEDICAL CENTER | Age: 26
End: 2021-01-20

## 2021-01-20 NOTE — TELEPHONE ENCOUNTER
"Called Lily regarding COVID-19 screening questions and updated check-in process at this time. Lily states understanding of updated process and is aware to call provided number. Patient answered \"no\" to screening questions. Lily is ok to proceed with treatment as scheduled for lab draw.  "

## 2021-01-21 ENCOUNTER — OUTPATIENT INFUSION SERVICES (OUTPATIENT)
Dept: ONCOLOGY | Facility: MEDICAL CENTER | Age: 26
End: 2021-01-21
Attending: INTERNAL MEDICINE
Payer: COMMERCIAL

## 2021-01-21 VITALS
RESPIRATION RATE: 18 BRPM | SYSTOLIC BLOOD PRESSURE: 149 MMHG | WEIGHT: 130.07 LBS | TEMPERATURE: 98.7 F | BODY MASS INDEX: 20.42 KG/M2 | HEART RATE: 66 BPM | HEIGHT: 67 IN | DIASTOLIC BLOOD PRESSURE: 100 MMHG | OXYGEN SATURATION: 98 %

## 2021-01-21 DIAGNOSIS — D64.9 ACUTE ON CHRONIC ANEMIA: ICD-10-CM

## 2021-01-21 LAB
ABO GROUP BLD: NORMAL
ANISOCYTOSIS BLD QL SMEAR: ABNORMAL
BARCODED ABORH UBTYP: 5100
BARCODED PRD CODE UBPRD: NORMAL
BARCODED UNIT NUM UBUNT: NORMAL
BASOPHILS # BLD AUTO: 0.4 % (ref 0–1.8)
BASOPHILS # BLD: 0.02 K/UL (ref 0–0.12)
BLD GP AB SCN SERPL QL: NORMAL
COMMENT 1642: NORMAL
COMPONENT R 8504R: NORMAL
EOSINOPHIL # BLD AUTO: 0.04 K/UL (ref 0–0.51)
EOSINOPHIL NFR BLD: 0.9 % (ref 0–6.9)
ERYTHROCYTE [DISTWIDTH] IN BLOOD BY AUTOMATED COUNT: 57.6 FL (ref 35.9–50)
HCT VFR BLD AUTO: 25.5 % (ref 37–47)
HGB BLD-MCNC: 7.3 G/DL (ref 12–16)
HYPOCHROMIA BLD QL SMEAR: ABNORMAL
IMM GRANULOCYTES # BLD AUTO: 0.02 K/UL (ref 0–0.11)
IMM GRANULOCYTES NFR BLD AUTO: 0.4 % (ref 0–0.9)
LYMPHOCYTES # BLD AUTO: 0.58 K/UL (ref 1–4.8)
LYMPHOCYTES NFR BLD: 13 % (ref 22–41)
MCH RBC QN AUTO: 27.9 PG (ref 27–33)
MCHC RBC AUTO-ENTMCNC: 28.6 G/DL (ref 33.6–35)
MCV RBC AUTO: 97.3 FL (ref 81.4–97.8)
MICROCYTES BLD QL SMEAR: ABNORMAL
MONOCYTES # BLD AUTO: 0.23 K/UL (ref 0–0.85)
MONOCYTES NFR BLD AUTO: 5.1 % (ref 0–13.4)
MORPHOLOGY BLD-IMP: NORMAL
NEUTROPHILS # BLD AUTO: 3.58 K/UL (ref 2–7.15)
NEUTROPHILS NFR BLD: 80.2 % (ref 44–72)
NRBC # BLD AUTO: 0 K/UL
NRBC BLD-RTO: 0 /100 WBC
PLATELET # BLD AUTO: 67 K/UL (ref 164–446)
PLATELET BLD QL SMEAR: NORMAL
PMV BLD AUTO: 12.4 FL (ref 9–12.9)
POIKILOCYTOSIS BLD QL SMEAR: NORMAL
POLYCHROMASIA BLD QL SMEAR: NORMAL
PRODUCT TYPE UPROD: NORMAL
RBC # BLD AUTO: 2.62 M/UL (ref 4.2–5.4)
RBC BLD AUTO: PRESENT
RH BLD: NORMAL
UNIT STATUS USTAT: NORMAL
WBC # BLD AUTO: 4.5 K/UL (ref 4.8–10.8)

## 2021-01-21 PROCEDURE — 36415 COLL VENOUS BLD VENIPUNCTURE: CPT

## 2021-01-21 PROCEDURE — 86850 RBC ANTIBODY SCREEN: CPT

## 2021-01-21 PROCEDURE — 86902 BLOOD TYPE ANTIGEN DONOR EA: CPT | Mod: 91

## 2021-01-21 PROCEDURE — 86901 BLOOD TYPING SEROLOGIC RH(D): CPT

## 2021-01-21 PROCEDURE — 86922 COMPATIBILITY TEST ANTIGLOB: CPT

## 2021-01-21 PROCEDURE — 85025 COMPLETE CBC W/AUTO DIFF WBC: CPT

## 2021-01-21 PROCEDURE — 86900 BLOOD TYPING SEROLOGIC ABO: CPT

## 2021-01-21 RX ORDER — LIDOCAINE HYDROCHLORIDE 10 MG/ML
20 INJECTION, SOLUTION INFILTRATION; PERINEURAL
Status: CANCELLED | OUTPATIENT
Start: 2021-01-21

## 2021-01-21 RX ORDER — SODIUM CHLORIDE 9 MG/ML
10 INJECTION, SOLUTION INTRAMUSCULAR; INTRAVENOUS; SUBCUTANEOUS PRN
Status: CANCELLED | OUTPATIENT
Start: 2021-01-21

## 2021-01-21 RX ORDER — ACETAMINOPHEN 325 MG/1
650 TABLET ORAL PRN
Status: CANCELLED | OUTPATIENT
Start: 2021-01-21

## 2021-01-21 RX ORDER — HEPARIN SODIUM (PORCINE) LOCK FLUSH IV SOLN 100 UNIT/ML 100 UNIT/ML
500 SOLUTION INTRAVENOUS PRN
Status: CANCELLED | OUTPATIENT
Start: 2021-01-21

## 2021-01-21 RX ORDER — DIPHENHYDRAMINE HCL 25 MG
25 TABLET ORAL ONCE
Status: CANCELLED | OUTPATIENT
Start: 2021-01-21 | End: 2021-01-21

## 2021-01-21 RX ORDER — DIPHENHYDRAMINE HYDROCHLORIDE 50 MG/ML
25 INJECTION INTRAMUSCULAR; INTRAVENOUS PRN
Status: CANCELLED | OUTPATIENT
Start: 2021-01-21

## 2021-01-21 RX ORDER — ACETAMINOPHEN 325 MG/1
650 TABLET ORAL ONCE
Status: CANCELLED | OUTPATIENT
Start: 2021-01-21 | End: 2021-01-21

## 2021-01-21 RX ORDER — SODIUM CHLORIDE 9 MG/ML
500 INJECTION, SOLUTION INTRAVENOUS ONCE
Status: CANCELLED | OUTPATIENT
Start: 2021-01-21 | End: 2021-01-21

## 2021-01-21 ASSESSMENT — FIBROSIS 4 INDEX: FIB4 SCORE: 3.05

## 2021-01-21 NOTE — PROGRESS NOTES
Pt arrived ambulatory to  for lab draw for blood transfusion on 01/23/21. Labs drawn from L[facility] [id number] with 23G butterfly needle without difficulty. Sterile gauze and coban applied to site and pt tolerated well. Saturday's appointment given to pt at this time and pt discharged home to self care in [facility] at this time. Pt instructed she will get a call at home today if her Hgb is critically low and pt verbalized understanding.     BB communication sent to BB at this time for one unit PRBCs per treatment plan; pt with Hgb of 7.3.

## 2021-01-23 ENCOUNTER — OUTPATIENT INFUSION SERVICES (OUTPATIENT)
Dept: ONCOLOGY | Facility: MEDICAL CENTER | Age: 26
End: 2021-01-23
Attending: INTERNAL MEDICINE
Payer: COMMERCIAL

## 2021-01-23 VITALS
HEIGHT: 67 IN | HEART RATE: 88 BPM | TEMPERATURE: 98 F | BODY MASS INDEX: 20.55 KG/M2 | SYSTOLIC BLOOD PRESSURE: 135 MMHG | RESPIRATION RATE: 18 BRPM | OXYGEN SATURATION: 100 % | WEIGHT: 130.95 LBS | DIASTOLIC BLOOD PRESSURE: 82 MMHG

## 2021-01-23 DIAGNOSIS — D64.9 ACUTE ON CHRONIC ANEMIA: ICD-10-CM

## 2021-01-23 PROCEDURE — 306780 HCHG STAT FOR TRANSFUSION PER CASE

## 2021-01-23 PROCEDURE — 700102 HCHG RX REV CODE 250 W/ 637 OVERRIDE(OP): Performed by: INTERNAL MEDICINE

## 2021-01-23 PROCEDURE — 36430 TRANSFUSION BLD/BLD COMPNT: CPT

## 2021-01-23 PROCEDURE — A9270 NON-COVERED ITEM OR SERVICE: HCPCS | Performed by: INTERNAL MEDICINE

## 2021-01-23 PROCEDURE — P9016 RBC LEUKOCYTES REDUCED: HCPCS

## 2021-01-23 RX ORDER — SODIUM CHLORIDE 9 MG/ML
500 INJECTION, SOLUTION INTRAVENOUS ONCE
Status: CANCELLED | OUTPATIENT
Start: 2021-01-23 | End: 2021-01-23

## 2021-01-23 RX ORDER — SODIUM CHLORIDE 9 MG/ML
10 INJECTION, SOLUTION INTRAMUSCULAR; INTRAVENOUS; SUBCUTANEOUS PRN
Status: CANCELLED | OUTPATIENT
Start: 2021-01-23

## 2021-01-23 RX ORDER — DIPHENHYDRAMINE HCL 25 MG
25 TABLET ORAL ONCE
Status: CANCELLED | OUTPATIENT
Start: 2021-01-23 | End: 2021-01-23

## 2021-01-23 RX ORDER — ACETAMINOPHEN 325 MG/1
650 TABLET ORAL PRN
Status: CANCELLED | OUTPATIENT
Start: 2021-01-23

## 2021-01-23 RX ORDER — ACETAMINOPHEN 325 MG/1
650 TABLET ORAL ONCE
Status: CANCELLED | OUTPATIENT
Start: 2021-01-23 | End: 2021-01-23

## 2021-01-23 RX ORDER — DIPHENHYDRAMINE HYDROCHLORIDE 50 MG/ML
25 INJECTION INTRAMUSCULAR; INTRAVENOUS PRN
Status: CANCELLED | OUTPATIENT
Start: 2021-01-23

## 2021-01-23 RX ORDER — DIPHENHYDRAMINE HCL 25 MG
25 TABLET ORAL ONCE
Status: COMPLETED | OUTPATIENT
Start: 2021-01-23 | End: 2021-01-23

## 2021-01-23 RX ORDER — HEPARIN SODIUM (PORCINE) LOCK FLUSH IV SOLN 100 UNIT/ML 100 UNIT/ML
500 SOLUTION INTRAVENOUS PRN
Status: CANCELLED | OUTPATIENT
Start: 2021-01-23

## 2021-01-23 RX ORDER — ACETAMINOPHEN 325 MG/1
650 TABLET ORAL ONCE
Status: COMPLETED | OUTPATIENT
Start: 2021-01-23 | End: 2021-01-23

## 2021-01-23 RX ORDER — LIDOCAINE HYDROCHLORIDE 10 MG/ML
20 INJECTION, SOLUTION INFILTRATION; PERINEURAL
Status: CANCELLED | OUTPATIENT
Start: 2021-01-23

## 2021-01-23 RX ADMIN — ACETAMINOPHEN 650 MG: 325 TABLET ORAL at 10:02

## 2021-01-23 RX ADMIN — DIPHENHYDRAMINE HYDROCHLORIDE 25 MG: 25 TABLET ORAL at 10:02

## 2021-01-23 ASSESSMENT — FIBROSIS 4 INDEX: FIB4 SCORE: 2.41

## 2021-01-23 NOTE — PROGRESS NOTES
Outcome: Not available    Please transfer to Patient Outreach Team at [phone] when patient returns call.    [facility] Verified: yes    Attempt # 1       Patient arrived ambulatory to [facility] for 1 unit PRBC.  She denies any s/s of infection or fevers.  PIV established with good blood return noted.  Pre medications given and 1 unit PRBC transfused.  Pt tolerated well.  PIV flushed, removed, and gauze/coban placed.  Appointment schedule provided and pt ambulated out of clinic in no apparent distress.

## 2021-02-02 RX ORDER — ACETAMINOPHEN 325 MG/1
650 TABLET ORAL PRN
Status: CANCELLED | OUTPATIENT
Start: 2021-02-02

## 2021-02-02 RX ORDER — DIPHENHYDRAMINE HYDROCHLORIDE 50 MG/ML
25 INJECTION INTRAMUSCULAR; INTRAVENOUS PRN
Status: CANCELLED | OUTPATIENT
Start: 2021-02-02

## 2021-02-02 RX ORDER — SODIUM CHLORIDE 9 MG/ML
500 INJECTION, SOLUTION INTRAVENOUS ONCE
Status: CANCELLED | OUTPATIENT
Start: 2021-02-02 | End: 2021-02-02

## 2021-02-02 RX ORDER — SODIUM CHLORIDE 9 MG/ML
10 INJECTION, SOLUTION INTRAMUSCULAR; INTRAVENOUS; SUBCUTANEOUS PRN
Status: CANCELLED | OUTPATIENT
Start: 2021-02-02

## 2021-02-02 RX ORDER — LIDOCAINE HYDROCHLORIDE 10 MG/ML
20 INJECTION, SOLUTION INFILTRATION; PERINEURAL
Status: CANCELLED | OUTPATIENT
Start: 2021-02-02

## 2021-02-02 RX ORDER — ACETAMINOPHEN 325 MG/1
650 TABLET ORAL ONCE
Status: CANCELLED | OUTPATIENT
Start: 2021-02-02 | End: 2021-02-02

## 2021-02-02 RX ORDER — DIPHENHYDRAMINE HCL 25 MG
25 TABLET ORAL ONCE
Status: CANCELLED | OUTPATIENT
Start: 2021-02-02 | End: 2021-02-02

## 2021-02-02 RX ORDER — HEPARIN SODIUM (PORCINE) LOCK FLUSH IV SOLN 100 UNIT/ML 100 UNIT/ML
500 SOLUTION INTRAVENOUS PRN
Status: CANCELLED | OUTPATIENT
Start: 2021-02-02

## 2021-02-04 ENCOUNTER — OUTPATIENT INFUSION SERVICES (OUTPATIENT)
Dept: ONCOLOGY | Facility: MEDICAL CENTER | Age: 26
End: 2021-02-04
Attending: INTERNAL MEDICINE
Payer: COMMERCIAL

## 2021-02-04 VITALS
BODY MASS INDEX: 20.76 KG/M2 | WEIGHT: 129.19 LBS | HEART RATE: 95 BPM | HEIGHT: 66 IN | TEMPERATURE: 97.5 F | RESPIRATION RATE: 18 BRPM | DIASTOLIC BLOOD PRESSURE: 97 MMHG | SYSTOLIC BLOOD PRESSURE: 147 MMHG | OXYGEN SATURATION: 95 %

## 2021-02-04 DIAGNOSIS — D64.9 ACUTE ON CHRONIC ANEMIA: ICD-10-CM

## 2021-02-04 LAB
ABO GROUP BLD: NORMAL
ANISOCYTOSIS BLD QL SMEAR: ABNORMAL
BARCODED ABORH UBTYP: 5100
BARCODED ABORH UBTYP: 5100
BARCODED PRD CODE UBPRD: NORMAL
BARCODED PRD CODE UBPRD: NORMAL
BARCODED UNIT NUM UBUNT: NORMAL
BARCODED UNIT NUM UBUNT: NORMAL
BASOPHILS # BLD AUTO: 0.7 % (ref 0–1.8)
BASOPHILS # BLD: 0.02 K/UL (ref 0–0.12)
BLD GP AB SCN SERPL QL: NORMAL
COMMENT 1642: NORMAL
COMPONENT R 8504R: NORMAL
COMPONENT R 8504R: NORMAL
EOSINOPHIL # BLD AUTO: 0.02 K/UL (ref 0–0.51)
EOSINOPHIL NFR BLD: 0.7 % (ref 0–6.9)
ERYTHROCYTE [DISTWIDTH] IN BLOOD BY AUTOMATED COUNT: 55.5 FL (ref 35.9–50)
HCT VFR BLD AUTO: 23.5 % (ref 37–47)
HGB BLD-MCNC: 6.8 G/DL (ref 12–16)
IMM GRANULOCYTES # BLD AUTO: 0.01 K/UL (ref 0–0.11)
IMM GRANULOCYTES NFR BLD AUTO: 0.4 % (ref 0–0.9)
LYMPHOCYTES # BLD AUTO: 0.49 K/UL (ref 1–4.8)
LYMPHOCYTES NFR BLD: 18.4 % (ref 22–41)
MCH RBC QN AUTO: 26.5 PG (ref 27–33)
MCHC RBC AUTO-ENTMCNC: 28.9 G/DL (ref 33.6–35)
MCV RBC AUTO: 91.4 FL (ref 81.4–97.8)
MICROCYTES BLD QL SMEAR: ABNORMAL
MONOCYTES # BLD AUTO: 0.28 K/UL (ref 0–0.85)
MONOCYTES NFR BLD AUTO: 10.5 % (ref 0–13.4)
MORPHOLOGY BLD-IMP: NORMAL
NEUTROPHILS # BLD AUTO: 1.85 K/UL (ref 2–7.15)
NEUTROPHILS NFR BLD: 69.3 % (ref 44–72)
NRBC # BLD AUTO: 0 K/UL
NRBC BLD-RTO: 0 /100 WBC
PLATELET # BLD AUTO: 94 K/UL (ref 164–446)
PLATELET BLD QL SMEAR: NORMAL
PMV BLD AUTO: 11.5 FL (ref 9–12.9)
PRODUCT TYPE UPROD: NORMAL
PRODUCT TYPE UPROD: NORMAL
RBC # BLD AUTO: 2.57 M/UL (ref 4.2–5.4)
RBC BLD AUTO: PRESENT
RH BLD: NORMAL
UNIT STATUS USTAT: NORMAL
UNIT STATUS USTAT: NORMAL
WBC # BLD AUTO: 2.7 K/UL (ref 4.8–10.8)

## 2021-02-04 PROCEDURE — 36415 COLL VENOUS BLD VENIPUNCTURE: CPT

## 2021-02-04 PROCEDURE — 85025 COMPLETE CBC W/AUTO DIFF WBC: CPT

## 2021-02-04 PROCEDURE — 86901 BLOOD TYPING SEROLOGIC RH(D): CPT

## 2021-02-04 PROCEDURE — 86900 BLOOD TYPING SEROLOGIC ABO: CPT

## 2021-02-04 PROCEDURE — 86850 RBC ANTIBODY SCREEN: CPT

## 2021-02-04 PROCEDURE — 86922 COMPATIBILITY TEST ANTIGLOB: CPT | Mod: 91

## 2021-02-04 PROCEDURE — 86902 BLOOD TYPE ANTIGEN DONOR EA: CPT | Mod: 91

## 2021-02-04 RX ORDER — HEPARIN SODIUM (PORCINE) LOCK FLUSH IV SOLN 100 UNIT/ML 100 UNIT/ML
500 SOLUTION INTRAVENOUS PRN
Status: CANCELLED | OUTPATIENT
Start: 2021-02-04

## 2021-02-04 RX ORDER — ACETAMINOPHEN 325 MG/1
650 TABLET ORAL ONCE
Status: CANCELLED | OUTPATIENT
Start: 2021-02-04 | End: 2021-02-04

## 2021-02-04 RX ORDER — LIDOCAINE HYDROCHLORIDE 10 MG/ML
20 INJECTION, SOLUTION INFILTRATION; PERINEURAL
Status: CANCELLED | OUTPATIENT
Start: 2021-02-04

## 2021-02-04 RX ORDER — DIPHENHYDRAMINE HCL 25 MG
25 TABLET ORAL ONCE
Status: CANCELLED | OUTPATIENT
Start: 2021-02-04 | End: 2021-02-04

## 2021-02-04 RX ORDER — ACETAMINOPHEN 325 MG/1
650 TABLET ORAL PRN
Status: CANCELLED | OUTPATIENT
Start: 2021-02-04

## 2021-02-04 RX ORDER — DIPHENHYDRAMINE HYDROCHLORIDE 50 MG/ML
25 INJECTION INTRAMUSCULAR; INTRAVENOUS PRN
Status: CANCELLED | OUTPATIENT
Start: 2021-02-04

## 2021-02-04 RX ORDER — SODIUM CHLORIDE 9 MG/ML
10 INJECTION, SOLUTION INTRAMUSCULAR; INTRAVENOUS; SUBCUTANEOUS PRN
Status: CANCELLED | OUTPATIENT
Start: 2021-02-04

## 2021-02-04 RX ORDER — SODIUM CHLORIDE 9 MG/ML
500 INJECTION, SOLUTION INTRAVENOUS ONCE
Status: CANCELLED | OUTPATIENT
Start: 2021-02-04 | End: 2021-02-04

## 2021-02-04 ASSESSMENT — FIBROSIS 4 INDEX: FIB4 SCORE: 2.41

## 2021-02-05 ENCOUNTER — APPOINTMENT (OUTPATIENT)
Dept: RADIOLOGY | Facility: MEDICAL CENTER | Age: 26
DRG: 871 | End: 2021-02-05
Attending: EMERGENCY MEDICINE
Payer: COMMERCIAL

## 2021-02-05 ENCOUNTER — HOSPITAL ENCOUNTER (INPATIENT)
Facility: MEDICAL CENTER | Age: 26
LOS: 3 days | DRG: 871 | End: 2021-02-08
Attending: EMERGENCY MEDICINE | Admitting: STUDENT IN AN ORGANIZED HEALTH CARE EDUCATION/TRAINING PROGRAM
Payer: COMMERCIAL

## 2021-02-05 DIAGNOSIS — Z99.2 END STAGE RENAL DISEASE ON DIALYSIS (HCC): ICD-10-CM

## 2021-02-05 DIAGNOSIS — R65.20 SEVERE SEPSIS (HCC): ICD-10-CM

## 2021-02-05 DIAGNOSIS — A41.9 SEVERE SEPSIS (HCC): ICD-10-CM

## 2021-02-05 DIAGNOSIS — M54.50 ACUTE BILATERAL LOW BACK PAIN WITHOUT SCIATICA: ICD-10-CM

## 2021-02-05 DIAGNOSIS — N18.6 END STAGE RENAL DISEASE ON DIALYSIS (HCC): ICD-10-CM

## 2021-02-05 DIAGNOSIS — R79.89 ELEVATED LACTIC ACID LEVEL: ICD-10-CM

## 2021-02-05 LAB
ABO GROUP BLD: NORMAL
ALBUMIN SERPL BCP-MCNC: 3.3 G/DL (ref 3.2–4.9)
ALBUMIN/GLOB SERPL: 0.6 G/DL
ALP SERPL-CCNC: 75 U/L (ref 30–99)
ALT SERPL-CCNC: 11 U/L (ref 2–50)
ANION GAP SERPL CALC-SCNC: 14 MMOL/L (ref 7–16)
APTT PPP: 28.5 SEC (ref 24.7–36)
AST SERPL-CCNC: 34 U/L (ref 12–45)
BARCODED ABORH UBTYP: 5100
BARCODED PRD CODE UBPRD: NORMAL
BARCODED UNIT NUM UBUNT: NORMAL
BASOPHILS # BLD AUTO: 0.7 % (ref 0–1.8)
BASOPHILS # BLD: 0.05 K/UL (ref 0–0.12)
BILIRUB SERPL-MCNC: 0.5 MG/DL (ref 0.1–1.5)
BLD GP AB SCN SERPL QL: NORMAL
BUN SERPL-MCNC: 9 MG/DL (ref 8–22)
CALCIUM SERPL-MCNC: 8.8 MG/DL (ref 8.4–10.2)
CHLORIDE SERPL-SCNC: 89 MMOL/L (ref 96–112)
CO2 SERPL-SCNC: 29 MMOL/L (ref 20–33)
COMPONENT R 8504R: NORMAL
CREAT SERPL-MCNC: 3.05 MG/DL (ref 0.5–1.4)
CRP SERPL HS-MCNC: 1.4 MG/DL (ref 0–0.75)
EKG IMPRESSION: NORMAL
EOSINOPHIL # BLD AUTO: 0.01 K/UL (ref 0–0.51)
EOSINOPHIL NFR BLD: 0.1 % (ref 0–6.9)
ERYTHROCYTE [DISTWIDTH] IN BLOOD BY AUTOMATED COUNT: 53.8 FL (ref 35.9–50)
ERYTHROCYTE [DISTWIDTH] IN BLOOD BY AUTOMATED COUNT: 55.8 FL (ref 35.9–50)
ERYTHROCYTE [SEDIMENTATION RATE] IN BLOOD BY WESTERGREN METHOD: 75 MM/HOUR (ref 0–20)
FLUAV RNA SPEC QL NAA+PROBE: NEGATIVE
FLUBV RNA SPEC QL NAA+PROBE: NEGATIVE
GLOBULIN SER CALC-MCNC: 5.8 G/DL (ref 1.9–3.5)
GLUCOSE SERPL-MCNC: 87 MG/DL (ref 65–99)
HCG SERPL QL: NEGATIVE
HCT VFR BLD AUTO: 23.7 % (ref 37–47)
HCT VFR BLD AUTO: 25.4 % (ref 37–47)
HGB BLD-MCNC: 6.7 G/DL (ref 12–16)
HGB BLD-MCNC: 7.4 G/DL (ref 12–16)
HGB RETIC QN AUTO: 21.5 PG/CELL (ref 29–35)
IMM GRANULOCYTES # BLD AUTO: 0.09 K/UL (ref 0–0.11)
IMM GRANULOCYTES NFR BLD AUTO: 1.3 % (ref 0–0.9)
IMM RETICS NFR: 22.7 % (ref 9.3–17.4)
INR PPP: 1.1 (ref 0.87–1.13)
LACTATE BLD-SCNC: 1.8 MMOL/L (ref 0.5–2)
LACTATE BLD-SCNC: 4 MMOL/L (ref 0.5–2)
LIPASE SERPL-CCNC: 20 U/L (ref 7–58)
LYMPHOCYTES # BLD AUTO: 0.52 K/UL (ref 1–4.8)
LYMPHOCYTES NFR BLD: 7.4 % (ref 22–41)
MCH RBC QN AUTO: 25.9 PG (ref 27–33)
MCH RBC QN AUTO: 26.4 PG (ref 27–33)
MCHC RBC AUTO-ENTMCNC: 28.3 G/DL (ref 33.6–35)
MCHC RBC AUTO-ENTMCNC: 29.1 G/DL (ref 33.6–35)
MCV RBC AUTO: 90.7 FL (ref 81.4–97.8)
MCV RBC AUTO: 91.5 FL (ref 81.4–97.8)
MONOCYTES # BLD AUTO: 0.38 K/UL (ref 0–0.85)
MONOCYTES NFR BLD AUTO: 5.4 % (ref 0–13.4)
NEUTROPHILS # BLD AUTO: 6 K/UL (ref 2–7.15)
NEUTROPHILS NFR BLD: 85.1 % (ref 44–72)
NRBC # BLD AUTO: 0 K/UL
NRBC BLD-RTO: 0 /100 WBC
PLATELET # BLD AUTO: 68 K/UL (ref 164–446)
PLATELET # BLD AUTO: 94 K/UL (ref 164–446)
PMV BLD AUTO: 11.3 FL (ref 9–12.9)
PMV BLD AUTO: 11.6 FL (ref 9–12.9)
POTASSIUM SERPL-SCNC: 3.9 MMOL/L (ref 3.6–5.5)
PROCALCITONIN SERPL-MCNC: 1.65 NG/ML
PRODUCT TYPE UPROD: NORMAL
PROT SERPL-MCNC: 9.1 G/DL (ref 6–8.2)
PROTHROMBIN TIME: 13.9 SEC (ref 12–14.6)
RBC # BLD AUTO: 2.59 M/UL (ref 4.2–5.4)
RBC # BLD AUTO: 2.8 M/UL (ref 4.2–5.4)
RETICS # AUTO: 0.1 M/UL (ref 0.04–0.06)
RETICS/RBC NFR: 3.8 % (ref 0.8–2.1)
RH BLD: NORMAL
RSV RNA SPEC QL NAA+PROBE: NEGATIVE
SARS-COV-2 RNA RESP QL NAA+PROBE: NOTDETECTED
SODIUM SERPL-SCNC: 132 MMOL/L (ref 135–145)
SPECIMEN SOURCE: NORMAL
TROPONIN T SERPL-MCNC: 931 NG/L (ref 6–19)
UNIT STATUS USTAT: NORMAL
WBC # BLD AUTO: 5.8 K/UL (ref 4.8–10.8)
WBC # BLD AUTO: 7.1 K/UL (ref 4.8–10.8)

## 2021-02-05 PROCEDURE — 85610 PROTHROMBIN TIME: CPT

## 2021-02-05 PROCEDURE — 700102 HCHG RX REV CODE 250 W/ 637 OVERRIDE(OP): Performed by: EMERGENCY MEDICINE

## 2021-02-05 PROCEDURE — 86850 RBC ANTIBODY SCREEN: CPT

## 2021-02-05 PROCEDURE — 700102 HCHG RX REV CODE 250 W/ 637 OVERRIDE(OP): Performed by: STUDENT IN AN ORGANIZED HEALTH CARE EDUCATION/TRAINING PROGRAM

## 2021-02-05 PROCEDURE — 85027 COMPLETE CBC AUTOMATED: CPT

## 2021-02-05 PROCEDURE — 84145 PROCALCITONIN (PCT): CPT

## 2021-02-05 PROCEDURE — 96376 TX/PRO/DX INJ SAME DRUG ADON: CPT

## 2021-02-05 PROCEDURE — 83550 IRON BINDING TEST: CPT

## 2021-02-05 PROCEDURE — 700105 HCHG RX REV CODE 258: Performed by: EMERGENCY MEDICINE

## 2021-02-05 PROCEDURE — A9270 NON-COVERED ITEM OR SERVICE: HCPCS | Performed by: EMERGENCY MEDICINE

## 2021-02-05 PROCEDURE — 85046 RETICYTE/HGB CONCENTRATE: CPT

## 2021-02-05 PROCEDURE — 96375 TX/PRO/DX INJ NEW DRUG ADDON: CPT

## 2021-02-05 PROCEDURE — 86039 ANTINUCLEAR ANTIBODIES (ANA): CPT

## 2021-02-05 PROCEDURE — 74176 CT ABD & PELVIS W/O CONTRAST: CPT

## 2021-02-05 PROCEDURE — C9803 HOPD COVID-19 SPEC COLLECT: HCPCS | Performed by: EMERGENCY MEDICINE

## 2021-02-05 PROCEDURE — 93005 ELECTROCARDIOGRAM TRACING: CPT | Performed by: EMERGENCY MEDICINE

## 2021-02-05 PROCEDURE — A9270 NON-COVERED ITEM OR SERVICE: HCPCS | Performed by: STUDENT IN AN ORGANIZED HEALTH CARE EDUCATION/TRAINING PROGRAM

## 2021-02-05 PROCEDURE — 84703 CHORIONIC GONADOTROPIN ASSAY: CPT

## 2021-02-05 PROCEDURE — 86922 COMPATIBILITY TEST ANTIGLOB: CPT

## 2021-02-05 PROCEDURE — 83540 ASSAY OF IRON: CPT

## 2021-02-05 PROCEDURE — 83690 ASSAY OF LIPASE: CPT

## 2021-02-05 PROCEDURE — 0241U HCHG SARS-COV-2 COVID-19 NFCT DS RESP RNA 4 TRGT MIC: CPT

## 2021-02-05 PROCEDURE — 71045 X-RAY EXAM CHEST 1 VIEW: CPT

## 2021-02-05 PROCEDURE — 99285 EMERGENCY DEPT VISIT HI MDM: CPT

## 2021-02-05 PROCEDURE — 85652 RBC SED RATE AUTOMATED: CPT

## 2021-02-05 PROCEDURE — 700105 HCHG RX REV CODE 258: Performed by: STUDENT IN AN ORGANIZED HEALTH CARE EDUCATION/TRAINING PROGRAM

## 2021-02-05 PROCEDURE — 80053 COMPREHEN METABOLIC PANEL: CPT

## 2021-02-05 PROCEDURE — 85025 COMPLETE CBC W/AUTO DIFF WBC: CPT

## 2021-02-05 PROCEDURE — 86225 DNA ANTIBODY NATIVE: CPT

## 2021-02-05 PROCEDURE — 700111 HCHG RX REV CODE 636 W/ 250 OVERRIDE (IP): Performed by: EMERGENCY MEDICINE

## 2021-02-05 PROCEDURE — 99223 1ST HOSP IP/OBS HIGH 75: CPT | Performed by: STUDENT IN AN ORGANIZED HEALTH CARE EDUCATION/TRAINING PROGRAM

## 2021-02-05 PROCEDURE — 86901 BLOOD TYPING SEROLOGIC RH(D): CPT

## 2021-02-05 PROCEDURE — 83605 ASSAY OF LACTIC ACID: CPT

## 2021-02-05 PROCEDURE — 36415 COLL VENOUS BLD VENIPUNCTURE: CPT

## 2021-02-05 PROCEDURE — 96365 THER/PROPH/DIAG IV INF INIT: CPT

## 2021-02-05 PROCEDURE — 86235 NUCLEAR ANTIGEN ANTIBODY: CPT | Mod: 91

## 2021-02-05 PROCEDURE — 86038 ANTINUCLEAR ANTIBODIES: CPT

## 2021-02-05 PROCEDURE — 87040 BLOOD CULTURE FOR BACTERIA: CPT | Mod: 91

## 2021-02-05 PROCEDURE — 86140 C-REACTIVE PROTEIN: CPT

## 2021-02-05 PROCEDURE — 700111 HCHG RX REV CODE 636 W/ 250 OVERRIDE (IP): Performed by: STUDENT IN AN ORGANIZED HEALTH CARE EDUCATION/TRAINING PROGRAM

## 2021-02-05 PROCEDURE — 85730 THROMBOPLASTIN TIME PARTIAL: CPT

## 2021-02-05 PROCEDURE — 770020 HCHG ROOM/CARE - TELE (206)

## 2021-02-05 PROCEDURE — 84484 ASSAY OF TROPONIN QUANT: CPT

## 2021-02-05 RX ORDER — SODIUM CHLORIDE, SODIUM LACTATE, POTASSIUM CHLORIDE, AND CALCIUM CHLORIDE .6; .31; .03; .02 G/100ML; G/100ML; G/100ML; G/100ML
30 INJECTION, SOLUTION INTRAVENOUS
Status: DISCONTINUED | OUTPATIENT
Start: 2021-02-05 | End: 2021-02-08 | Stop reason: HOSPADM

## 2021-02-05 RX ORDER — HEPARIN SODIUM 5000 [USP'U]/ML
5000 INJECTION, SOLUTION INTRAVENOUS; SUBCUTANEOUS EVERY 8 HOURS
Status: DISCONTINUED | OUTPATIENT
Start: 2021-02-05 | End: 2021-02-08 | Stop reason: HOSPADM

## 2021-02-05 RX ORDER — PANTOPRAZOLE SODIUM 40 MG/1
40 TABLET, DELAYED RELEASE ORAL 2 TIMES DAILY
Status: DISCONTINUED | OUTPATIENT
Start: 2021-02-05 | End: 2021-02-05

## 2021-02-05 RX ORDER — MORPHINE SULFATE 4 MG/ML
4 INJECTION, SOLUTION INTRAMUSCULAR; INTRAVENOUS ONCE
Status: COMPLETED | OUTPATIENT
Start: 2021-02-05 | End: 2021-02-05

## 2021-02-05 RX ORDER — ONDANSETRON 2 MG/ML
4 INJECTION INTRAMUSCULAR; INTRAVENOUS EVERY 6 HOURS PRN
Status: DISCONTINUED | OUTPATIENT
Start: 2021-02-05 | End: 2021-02-08 | Stop reason: HOSPADM

## 2021-02-05 RX ORDER — HYDRALAZINE HYDROCHLORIDE 10 MG/1
10 TABLET, FILM COATED ORAL EVERY 8 HOURS PRN
Status: DISCONTINUED | OUTPATIENT
Start: 2021-02-05 | End: 2021-02-05

## 2021-02-05 RX ORDER — MYCOPHENOLATE MOFETIL 250 MG/1
250 CAPSULE ORAL EVERY EVENING
Status: DISCONTINUED | OUTPATIENT
Start: 2021-02-05 | End: 2021-02-08 | Stop reason: HOSPADM

## 2021-02-05 RX ORDER — ACETAMINOPHEN 325 MG/1
325 TABLET ORAL ONCE
Status: COMPLETED | OUTPATIENT
Start: 2021-02-05 | End: 2021-02-05

## 2021-02-05 RX ORDER — ACETAMINOPHEN 325 MG/1
650 TABLET ORAL EVERY 6 HOURS PRN
Status: DISCONTINUED | OUTPATIENT
Start: 2021-02-05 | End: 2021-02-06

## 2021-02-05 RX ORDER — FUROSEMIDE 40 MG/1
40 TABLET ORAL 2 TIMES DAILY
Status: DISCONTINUED | OUTPATIENT
Start: 2021-02-05 | End: 2021-02-08 | Stop reason: HOSPADM

## 2021-02-05 RX ORDER — MORPHINE SULFATE 10 MG/ML
6 INJECTION, SOLUTION INTRAMUSCULAR; INTRAVENOUS ONCE
Status: COMPLETED | OUTPATIENT
Start: 2021-02-05 | End: 2021-02-05

## 2021-02-05 RX ORDER — OMEPRAZOLE 20 MG/1
20 CAPSULE, DELAYED RELEASE ORAL 2 TIMES DAILY
Status: DISCONTINUED | OUTPATIENT
Start: 2021-02-05 | End: 2021-02-08 | Stop reason: HOSPADM

## 2021-02-05 RX ORDER — POLYETHYLENE GLYCOL 3350 17 G/17G
1 POWDER, FOR SOLUTION ORAL
Status: DISCONTINUED | OUTPATIENT
Start: 2021-02-05 | End: 2021-02-08 | Stop reason: HOSPADM

## 2021-02-05 RX ORDER — SODIUM CHLORIDE, SODIUM LACTATE, POTASSIUM CHLORIDE, CALCIUM CHLORIDE 600; 310; 30; 20 MG/100ML; MG/100ML; MG/100ML; MG/100ML
500 INJECTION, SOLUTION INTRAVENOUS ONCE
Status: COMPLETED | OUTPATIENT
Start: 2021-02-05 | End: 2021-02-05

## 2021-02-05 RX ORDER — BISACODYL 10 MG
10 SUPPOSITORY, RECTAL RECTAL
Status: DISCONTINUED | OUTPATIENT
Start: 2021-02-05 | End: 2021-02-08 | Stop reason: HOSPADM

## 2021-02-05 RX ORDER — ONDANSETRON 2 MG/ML
4 INJECTION INTRAMUSCULAR; INTRAVENOUS ONCE
Status: COMPLETED | OUTPATIENT
Start: 2021-02-05 | End: 2021-02-05

## 2021-02-05 RX ORDER — ACETAMINOPHEN 325 MG/1
650 TABLET ORAL ONCE
Status: COMPLETED | OUTPATIENT
Start: 2021-02-05 | End: 2021-02-05

## 2021-02-05 RX ORDER — PREDNISONE 10 MG/1
5 TABLET ORAL EVERY EVENING
Status: DISCONTINUED | OUTPATIENT
Start: 2021-02-05 | End: 2021-02-05

## 2021-02-05 RX ORDER — DIPHENHYDRAMINE HYDROCHLORIDE 50 MG/ML
25 INJECTION INTRAMUSCULAR; INTRAVENOUS EVERY 6 HOURS PRN
Status: DISCONTINUED | OUTPATIENT
Start: 2021-02-05 | End: 2021-02-08 | Stop reason: HOSPADM

## 2021-02-05 RX ORDER — HYDRALAZINE HYDROCHLORIDE 20 MG/ML
20 INJECTION INTRAMUSCULAR; INTRAVENOUS EVERY 6 HOURS PRN
Status: DISCONTINUED | OUTPATIENT
Start: 2021-02-05 | End: 2021-02-08 | Stop reason: HOSPADM

## 2021-02-05 RX ORDER — HYDROXYCHLOROQUINE SULFATE 200 MG/1
200 TABLET, FILM COATED ORAL EVERY EVENING
Status: DISCONTINUED | OUTPATIENT
Start: 2021-02-05 | End: 2021-02-08 | Stop reason: HOSPADM

## 2021-02-05 RX ORDER — SUCRALFATE ORAL 1 G/10ML
1 SUSPENSION ORAL 2 TIMES DAILY
Status: DISCONTINUED | OUTPATIENT
Start: 2021-02-05 | End: 2021-02-08 | Stop reason: HOSPADM

## 2021-02-05 RX ORDER — DIPHENHYDRAMINE HCL 25 MG
25 TABLET ORAL EVERY 6 HOURS PRN
Status: DISCONTINUED | OUTPATIENT
Start: 2021-02-05 | End: 2021-02-08 | Stop reason: HOSPADM

## 2021-02-05 RX ORDER — AMOXICILLIN 250 MG
2 CAPSULE ORAL 2 TIMES DAILY
Status: DISCONTINUED | OUTPATIENT
Start: 2021-02-06 | End: 2021-02-08 | Stop reason: HOSPADM

## 2021-02-05 RX ORDER — ATENOLOL 25 MG/1
25 TABLET ORAL EVERY EVENING
Status: DISCONTINUED | OUTPATIENT
Start: 2021-02-05 | End: 2021-02-08 | Stop reason: HOSPADM

## 2021-02-05 RX ORDER — AMLODIPINE BESYLATE 5 MG/1
10 TABLET ORAL EVERY EVENING
Status: DISCONTINUED | OUTPATIENT
Start: 2021-02-05 | End: 2021-02-08 | Stop reason: HOSPADM

## 2021-02-05 RX ADMIN — ACETAMINOPHEN 650 MG: 325 TABLET, FILM COATED ORAL at 14:30

## 2021-02-05 RX ADMIN — ATENOLOL 25 MG: 25 TABLET ORAL at 20:28

## 2021-02-05 RX ADMIN — SODIUM CHLORIDE, POTASSIUM CHLORIDE, SODIUM LACTATE AND CALCIUM CHLORIDE 500 ML: 600; 310; 30; 20 INJECTION, SOLUTION INTRAVENOUS at 17:53

## 2021-02-05 RX ADMIN — SUCRALFATE 1 G: 1 SUSPENSION ORAL at 18:00

## 2021-02-05 RX ADMIN — CEFTRIAXONE SODIUM 2 G: 2 INJECTION, POWDER, FOR SOLUTION INTRAMUSCULAR; INTRAVENOUS at 15:01

## 2021-02-05 RX ADMIN — FUROSEMIDE 40 MG: 40 TABLET ORAL at 20:27

## 2021-02-05 RX ADMIN — MORPHINE SULFATE 6 MG: 10 INJECTION INTRAVENOUS at 14:13

## 2021-02-05 RX ADMIN — ONDANSETRON 4 MG: 2 INJECTION INTRAMUSCULAR; INTRAVENOUS at 14:14

## 2021-02-05 RX ADMIN — ACETAMINOPHEN 325 MG: 325 TABLET, FILM COATED ORAL at 17:22

## 2021-02-05 RX ADMIN — MORPHINE SULFATE 4 MG: 4 INJECTION INTRAVENOUS at 16:18

## 2021-02-05 RX ADMIN — OMEPRAZOLE 20 MG: 20 CAPSULE, DELAYED RELEASE ORAL at 20:33

## 2021-02-05 RX ADMIN — PREDNISONE 30 MG: 10 TABLET ORAL at 20:28

## 2021-02-05 RX ADMIN — ACETAMINOPHEN 650 MG: 325 TABLET, FILM COATED ORAL at 20:16

## 2021-02-05 RX ADMIN — AMLODIPINE BESYLATE 10 MG: 5 TABLET ORAL at 20:27

## 2021-02-05 ASSESSMENT — LIFESTYLE VARIABLES
EVER FELT BAD OR GUILTY ABOUT YOUR DRINKING: NO
TOTAL SCORE: 0
TOTAL SCORE: 0
ALCOHOL_USE: NO
TOTAL SCORE: 0
AVERAGE NUMBER OF DAYS PER WEEK YOU HAVE A DRINK CONTAINING ALCOHOL: 0
HOW MANY TIMES IN THE PAST YEAR HAVE YOU HAD 5 OR MORE DRINKS IN A DAY: 0
HAVE PEOPLE ANNOYED YOU BY CRITICIZING YOUR DRINKING: NO
ON A TYPICAL DAY WHEN YOU DRINK ALCOHOL HOW MANY DRINKS DO YOU HAVE: 0
HAVE YOU EVER FELT YOU SHOULD CUT DOWN ON YOUR DRINKING: NO
EVER HAD A DRINK FIRST THING IN THE MORNING TO STEADY YOUR NERVES TO GET RID OF A HANGOVER: NO
CONSUMPTION TOTAL: NEGATIVE

## 2021-02-05 ASSESSMENT — ENCOUNTER SYMPTOMS
CARDIOVASCULAR NEGATIVE: 1
EYES NEGATIVE: 1
SHORTNESS OF BREATH: 1
PSYCHIATRIC NEGATIVE: 1
FALLS: 0
NEUROLOGICAL NEGATIVE: 1
BACK PAIN: 1
GASTROINTESTINAL NEGATIVE: 1
FEVER: 1
NECK PAIN: 0
COUGH: 0

## 2021-02-05 ASSESSMENT — PAIN DESCRIPTION - PAIN TYPE: TYPE: ACUTE PAIN

## 2021-02-05 ASSESSMENT — COGNITIVE AND FUNCTIONAL STATUS - GENERAL
SUGGESTED CMS G CODE MODIFIER DAILY ACTIVITY: CH
MOBILITY SCORE: 24
DAILY ACTIVITIY SCORE: 24
SUGGESTED CMS G CODE MODIFIER MOBILITY: CH

## 2021-02-05 ASSESSMENT — FIBROSIS 4 INDEX: FIB4 SCORE: 1.72

## 2021-02-05 ASSESSMENT — PAIN DESCRIPTION - DESCRIPTORS: DESCRIPTORS: ACHING;PENETRATING

## 2021-02-05 NOTE — ED NOTES
Pharmacy Medication Reconciliation      Medication reconciliation updated and complete per pt at bedside  Allergies have been verified and updated   No oral ABX within the last 14 days  Patient home pharmacy:[facility]

## 2021-02-05 NOTE — ED TRIAGE NOTES
"Pt C/O chronic back pain with recurrence of exacerbations.  She also reports bilateral flank pain for the past few hours.  She is febrile in triage, and meets the sepsis R/O criteria.  She is roomed expeditiously.      Chief Complaint   Patient presents with   • Low Back Pain   • Fever   • Flank Pain       /85   Pulse 96   Temp (!) 38.3 °C (101 °F) (Temporal)   Resp 20   Ht 1.676 m (5' 6\")   Wt 58 kg (127 lb 13.9 oz)   LMP 01/29/2021 (Exact Date)   SpO2 96%   BMI 20.64 kg/m²      "

## 2021-02-05 NOTE — ED NOTES
tech from Lab called with critical result of troponin 931 at 1506. Critical lab result read back to tech.   Dr. taylor notified of critical lab result at 1506.  Critical lab result read back by Dr. taylor.

## 2021-02-05 NOTE — ED PROVIDER NOTES
"ED Provider Note    ED Provider Note    Scribed for Allison Cormier MD by Allison Cormier M.D.. 2/5/2021, 2:08 PM.    Primary care provider: Ella Matthew M.D.  Means of arrival: Private  History obtained from: Patient  History limited by: None    CHIEF COMPLAINT  Chief Complaint   Patient presents with   • Low Back Pain   • Fever   • Flank Pain       HPI  Lily Nicole is a 25 y.o. female who presents to the Emergency Department for evaluation of low back pain without any trauma. Patient is a dialysis patient, she was undergoing dialysis today without any complications. She tells me after dialysis, about 2 to 3 hours prior to arrival after lunch she started have pain at the bilateral low back. She notes it seems to have moved in fairly, no localizes approximating L5 S1 in the bilateral sacroiliac joints. She knows no radiation of the abdomen, no radiation down the legs. She has no change in bowel or bladder habitus. She does still make urine but denies any dysuria. She has no diarrhea and no vomiting. He notes no cough, no dyspnea, no sputum production. Patient relates that she has a somewhat similar symptoms in the past when she was told she had \"fluid on her lungs\", though she denies any acute dyspnea today. She has chronic changes on her skin secondary to her condition but notes no acute rash. No particular ill contacts known, the patient does know she goes to a dialysis center as noted above. She did not know she was febrile, temperature 101 noted in triage.    REVIEW OF SYSTEMS  Pertinent positives include bilateral atraumatic low back pain initially in the flank in a localized approximate lumbosacral regions without any trauma, associated fever. Pertinent negatives include no dysuria, no cough, no neck pain, no diarrhea, no vomiting, no acute rash.  All other systems reviewed and negative.    PAST MEDICAL HISTORY   has a past medical history of Anemia (01/17/2018), AVF (arteriovenous " fistula) ([facility]), Dialysis patient ([facility]), ESRD (end stage renal disease) on dialysis ([facility]) (01/17/2018), Heart burn, Hypertension (01/17/2018), Indigestion, Lupus (HCC), Migraines (01/17/2018), and Seizure ([facility]) (2013).    SURGICAL HISTORY   has a past surgical history that includes ronak by laparoscopy (4/5/2010); av fistula creation (Right); angioplasty (01/17/2018); other; other; gastroscopy-endo (12/9/2019); gastroscopy (N/A, 5/30/2020); gastroscopy-endo (9/18/2020); upper gi endoscopy,ctrl bleed (11/12/2020); upper gi endoscopy,biopsy (11/12/2020); gastroscopy-endo (11/12/2020); colonoscopy,diagnostic (1/8/2021); upper gi endoscopy,diagnosis (1/8/2021); and upper gi endoscopy,ctrl bleed (1/8/2021).    SOCIAL HISTORY  Social History     Tobacco Use   • Smoking status: Never Smoker   • Smokeless tobacco: Never Used   Substance Use Topics   • Alcohol use: No   • Drug use: No      Social History     Substance and Sexual Activity   Drug Use No       FAMILY HISTORY  Family History   Problem Relation Age of Onset   • Diabetes Paternal Grandmother        CURRENT MEDICATIONS  Home Medications     Reviewed by Shefali Guzman (Pharmacy Tech) on 02/05/21 at 1430  Med List Status: Complete   Medication Last Dose Status   acetaminophen (TYLENOL) 500 MG Tab PRN Active   amLODIPine (NORVASC) 10 MG Tab 2/4/2021 Active   atenolol (TENORMIN) 25 MG Tab 2/4/2021 Active   furosemide (LASIX) 40 MG Tab 2/4/2021 Active   hydroxychloroquine (PLAQUENIL) 200 MG Tab 2/4/2021 Active   mycophenolate (MYFORTIC) 180 MG EC tablet 2/4/2021 Active   ondansetron (ZOFRAN ODT) 4 MG TABLET DISPERSIBLE PRN Active   pantoprazole (PROTONIX) 40 MG Tablet Delayed Response 2/4/2021 Active   predniSONE (DELTASONE) 5 MG Tab 2/4/2021 Active   sucralfate (CARAFATE) 1 GM/10ML Suspension 2/4/2021 Active                ALLERGIES  Allergies   Allergen Reactions   • Cephalexin Rash     .   • Clindamycin Rash     .   • Methylprednisolone Unspecified     Anxious  "  • Metoprolol Rash     .       PHYSICAL EXAM  VITAL SIGNS: BP (!) 177/113   Pulse (!) 117   Temp (!) 39.4 °C (102.9 °F) (Temporal)   Resp 16   Ht 1.676 m (5' 6\")   Wt 58 kg (127 lb 13.9 oz)   LMP 01/29/2021 (Exact Date)   SpO2 97%   BMI 20.64 kg/m²     General: Alert, mils acute distress, appears moderately uncomfortable  Skin: Warm, dry, mildly pale.  Head: Normocephalic, atraumatic  Neck: Trachea midline, no tenderness  Eye: PERRL, normal conjunctiva  ENMT: Oral mucosa pink and mildly dry, no pharyngeal erythema or exudate  Cardiovascular: Regular rate and rhythm, No murmur, Normal peripheral perfusion  Respiratory: Lungs CTA, respirations are non-labored, breath sounds are equal  Gastrointestinal: Soft, nontender, non distended  Musculoskeletal: No swelling, no deformity. Patient has midline and paraspinal tenderness approximating L4 L5 and S1 is to as well as the bilateral sacroiliac joints, no step off. No CVA tenderness noted.  Neurological: Alert and oriented to person, place, time, and situation  Lymphatics: No lymphadenopathy  Psychiatric: Cooperative, appropriate mood & affect      DIAGNOSTIC STUDIES/PROCEDURES    LABS  Results for orders placed or performed during the hospital encounter of 02/05/21   Lactic acid (lactate): Repeat if initial lactic acid result is greater than 2   Result Value Ref Range    Lactic Acid 1.8 0.5 - 2.0 mmol/L   CBC WITH DIFFERENTIAL   Result Value Ref Range    WBC 7.1 4.8 - 10.8 K/uL    RBC 2.80 (L) 4.20 - 5.40 M/uL    Hemoglobin 7.4 (L) 12.0 - 16.0 g/dL    Hematocrit 25.4 (L) 37.0 - 47.0 %    MCV 90.7 81.4 - 97.8 fL    MCH 26.4 (L) 27.0 - 33.0 pg    MCHC 29.1 (L) 33.6 - 35.0 g/dL    RDW 53.8 (H) 35.9 - 50.0 fL    Platelet Count 94 (L) 164 - 446 K/uL    MPV 11.3 9.0 - 12.9 fL    Neutrophils-Polys 85.10 (H) 44.00 - 72.00 %    Lymphocytes 7.40 (L) 22.00 - 41.00 %    Monocytes 5.40 0.00 - 13.40 %    Eosinophils 0.10 0.00 - 6.90 %    Basophils 0.70 0.00 - 1.80 %    " Immature Granulocytes 1.30 (H) 0.00 - 0.90 %    Nucleated RBC 0.00 /100 WBC    Neutrophils (Absolute) 6.00 2.00 - 7.15 K/uL    Lymphs (Absolute) 0.52 (L) 1.00 - 4.80 K/uL    Monos (Absolute) 0.38 0.00 - 0.85 K/uL    Eos (Absolute) 0.01 0.00 - 0.51 K/uL    Baso (Absolute) 0.05 0.00 - 0.12 K/uL    Immature Granulocytes (abs) 0.09 0.00 - 0.11 K/uL    NRBC (Absolute) 0.00 K/uL   COMP METABOLIC PANEL   Result Value Ref Range    Sodium 132 (L) 135 - 145 mmol/L    Potassium 3.9 3.6 - 5.5 mmol/L    Chloride 89 (L) 96 - 112 mmol/L    Co2 29 20 - 33 mmol/L    Anion Gap 14.0 7.0 - 16.0    Glucose 87 65 - 99 mg/dL    Bun 9 8 - 22 mg/dL    Creatinine 3.05 (H) 0.50 - 1.40 mg/dL    Calcium 8.8 8.4 - 10.2 mg/dL    AST(SGOT) 34 12 - 45 U/L    ALT(SGPT) 11 2 - 50 U/L    Alkaline Phosphatase 75 30 - 99 U/L    Total Bilirubin 0.5 0.1 - 1.5 mg/dL    Albumin 3.3 3.2 - 4.9 g/dL    Total Protein 9.1 (H) 6.0 - 8.2 g/dL    Globulin 5.8 (H) 1.9 - 3.5 g/dL    A-G Ratio 0.6 g/dL   COV-2, FLU A/B, AND RSV BY PCR (2-4 HOURS TravelKnowledge): Collect NP swab in VTM    Specimen: Respirate   Result Value Ref Range    Influenza virus A RNA Negative Negative    Influenza virus B, PCR Negative Negative    RSV, PCR Negative Negative    SARS-CoV-2 by PCR NotDetected     SARS-CoV-2 Source np swab    COD (ADULT)   Result Value Ref Range    ABO Grouping Only O     Rh Grouping Only POS     Antibody Screen-Cod NEG    LACTIC ACID   Result Value Ref Range    Lactic Acid 4.0 (HH) 0.5 - 2.0 mmol/L   Prothrombin Time   Result Value Ref Range    PT 13.9 12.0 - 14.6 sec    INR 1.10 0.87 - 1.13   APTT   Result Value Ref Range    APTT 28.5 24.7 - 36.0 sec   LIPASE   Result Value Ref Range    Lipase 20 7 - 58 U/L   HCG QUAL SERUM   Result Value Ref Range    Beta-Hcg Qualitative Serum Negative Negative   TROPONIN   Result Value Ref Range    Troponin T 931 (H) 6 - 19 ng/L   CRP QUANTITIVE (NON-CARDIAC)   Result Value Ref Range    Stat C-Reactive Protein 1.40 (H) 0.00 - 0.75  mg/dL   PROCALCITONIN   Result Value Ref Range    Procalcitonin 1.65 (H) <0.25 ng/mL   ESTIMATED GFR   Result Value Ref Range    GFR If  23 (A) >60 mL/min/1.73 m 2    GFR If Non  19 (A) >60 mL/min/1.73 m 2   EKG (NOW)   Result Value Ref Range    Report       [facility] Emergency Dept.    Test Date:  2021  Pt Name:    CASE WOODSON            Department: [facility]  MRN:        [id number]                      [facility]  Gender:     Female                       Technician: [id number]  :        [id number]                   Requested By:ALBERT CORBIN  Order #:    [id number]                    Reading MD:    Measurements  Intervals                                Axis  Rate:       114                          P:          88  CO:         156                          QRS:        2  QRSD:       84                           T:          102  QT:         328  QTc:        452    Interpretive Statements  SINUS TACHYCARDIA  PROBABLE LEFT VENTRICULAR HYPERTROPHY  Compared to ECG 2021 22:58:24  No significant changes       All labs reviewed by me, impressively elevated lactic acid, left shift noted, concerning for septicemia.  Lactate is improving with IV fluids.  Troponin is consistent with previous values.    EKG  12 Lead EKG obtained at 1512 and interpreted by me to show:  Rhythm: Sinus tachycardia  Rate: 114  Axis: Left  Intervals: Normal  Q Waves: Normal  No diagnostic ST segment elevation    Clinical Impression: Normal EKG  Compared to 2021    RADIOLOGY  CT-RENAL COLIC EVALUATION(A/P W/O)   Final Result      1.  Atrophic kidneys with probable cysts, similar to prior exam.  Mass is not excluded on noncontrast study.   2.  Dilated duodenum and proximal small bowel may indicate early or partial obstruction.   3.  Mildly increased colonic stool.   4.  Normal appendix.   5.  LEFT ovarian cyst versus dominant follicle.      DX-CHEST-PORTABLE  "(1 VIEW)   Final Result      Cardiomegaly.        The radiologist's interpretation of all radiological studies have been reviewed by me.    COURSE & MEDICAL DECISION MAKING  Pertinent Labs & Imaging studies reviewed. (See chart for details)    2:08 PM - Patient seen and examined at bedside. Patient will be treated with morphine, Zofran, acetaminophen, and 30 mL per kilogram) fluid bolus PRN hypotension per our sepsis protocol. Ordered separate workup as well as CT imaging the abdomen pelvis without contrast, lipases to evaluate her symptoms. The differential diagnoses include but are not limited to: septicemia, electrolyte abnormality, UTI, pyelonephritis, musculoskeletal pain, pneumonia, COVID-19    1420: lactate is indeed very high. Things finding this is concerning for severe sepsis. Unclear the source of infection at this point, I have ordered Rocephin 2 g IV per our protocol.    1555: I spoke with the hospitalist who concurs with plan and accepts the patient to his service.  Patient is still tachycardic at this point, awaiting repeat lactic acid.  Updated patient with plan and unremarkable imaging thus far.  No evidence clinically of any sort of bowel obstruction.    I examined the patient 2/5/2021 1555  Vital Signs:BP (!) 177/113   Pulse (!) 117   Temp (!) 39.4 °C (102.9 °F) (Temporal)   Resp 16   Ht 1.676 m (5' 6\")   Wt 58 kg (127 lb 13.9 oz)   LMP 01/29/2021 (Exact Date)   SpO2 97%   BMI 20.64 kg/m²   Cardiac examination significant for Tachycardia  Pulmonary examination significant for Clear lung fileds  Capillary refill is brisk  Peripheral Pulse is 2+   Skin is pale    1700: Lactate is improving, currently 1.8.  She is still febrile, I have ordered additional acetaminophen if she still had 650 thus far, 325 p.o.  Ordered.  Awaiting urinalysis at this time.    Patient is critically ill.   The patient continues to have: Tachycardia, fever  The vital organ system that is affected is the: " "Cardiovascular  If untreated there is a high chance of deterioration into: Septic shock  And eventually death.   The critical care that I am providing today is: broad spectrum Abx  The critical that has been undertaken is medically complex.   There has been no overlap in critical care time.   Critical Care Time not including procedures: 40 min    HYDRATION: Based on the patient's presentation of Sepsis and Tachycardia the patient was given IV fluids. IV Hydration was used because oral hydration was not as rapid as required. Upon recheck following hydration, the patient was Doing better, lactic acid improving, 1.8 on reassessment.    Patient Vitals for the past 24 hrs:   BP Temp Temp src Pulse Resp SpO2 Height Weight   02/05/21 1707 -- (!) 39.4 °C (102.9 °F) Temporal -- -- -- -- --   02/05/21 1642 (!) 177/113 -- -- (!) 117 16 97 % -- --   02/05/21 1552 -- -- -- (!) 117 -- 95 % -- --   02/05/21 1549 -- -- -- -- 17 -- -- --   02/05/21 1541 (!) 164/105 -- -- -- -- -- -- --   02/05/21 1532 (!) 168/105 -- -- (!) 113 -- 93 % -- --   02/05/21 1520 (!) 168/106 -- -- (!) 110 -- 96 % -- --   02/05/21 1441 (!) 168/106 -- -- 93 -- 100 % -- --   02/05/21 1411 151/100 -- -- -- -- -- -- --   02/05/21 1336 125/85 (!) 38.3 °C (101 °F) Temporal 96 20 96 % 1.676 m (5' 6\") 58 kg (127 lb 13.9 oz)         Decision Making:  This is a 25 y.o. year old female who presents with fever and acute bilateral low back pain. Patient has tenderness approximately lower lumbosacral regions as well as a sacroiliac joints with no carolann CVA tenderness. Tmax of 101. Significant risk factor with regard to infection secondary to her chronic kidney disease on dialysis. Broad differential at this point, septicemia certainly is the differential given fever and heart rate in your tachycardic range in this 25-year-old. Indeed lactate is very high, 4.0. Unclear source of infection on initial evaluation, I have ordered Rocephin per our septic protocol.  Patient has " no chest pain no dyspnea, troponin is elevated but is similar to previous values, EKG is nonischemic.  Heart score is 2, low risk stratification.  I suspect likely troponin is elevated and has been so secondary to her renal disease    Patient is admitted to the care of the hospitalist in improved guarded condition to medical telemetry.      FINAL IMPRESSION  1. Acute bilateral low back pain without sciatica    2. End stage renal disease on dialysis (HCC)    3. Severe sepsis (HCC)    4. Elevated lactic acid level          Allison PARRA M.D. (Scribe), am scribing for, and in the presence of, Allison Cormier MD.    Electronically signed by: Allison Cormier M.D. (Scribe), 2/5/2021    IAllison MD personally performed the services described in this documentation, as scribed by Allison Cormier M.D. in my presence, and it is both accurate and complete    The note accurately reflects work and decisions made by me.  Allison Cormier M.D.  2/5/2021  5:34 PM

## 2021-02-06 ENCOUNTER — APPOINTMENT (OUTPATIENT)
Dept: ONCOLOGY | Facility: MEDICAL CENTER | Age: 26
End: 2021-02-06
Attending: INTERNAL MEDICINE
Payer: COMMERCIAL

## 2021-02-06 ENCOUNTER — APPOINTMENT (OUTPATIENT)
Dept: RADIOLOGY | Facility: MEDICAL CENTER | Age: 26
DRG: 871 | End: 2021-02-06
Attending: STUDENT IN AN ORGANIZED HEALTH CARE EDUCATION/TRAINING PROGRAM
Payer: COMMERCIAL

## 2021-02-06 PROBLEM — R74.01 TRANSAMINITIS: Status: ACTIVE | Noted: 2021-02-06

## 2021-02-06 PROBLEM — M54.9 BACK PAIN: Status: ACTIVE | Noted: 2021-02-06

## 2021-02-06 LAB
ALBUMIN SERPL BCP-MCNC: 2.9 G/DL (ref 3.2–4.9)
ALBUMIN/GLOB SERPL: 0.5 G/DL
ALP SERPL-CCNC: 202 U/L (ref 30–99)
ALT SERPL-CCNC: 174 U/L (ref 2–50)
ANION GAP SERPL CALC-SCNC: 11 MMOL/L (ref 7–16)
APPEARANCE UR: CLEAR
AST SERPL-CCNC: 353 U/L (ref 12–45)
BACTERIA #/AREA URNS HPF: ABNORMAL /HPF
BASOPHILS # BLD AUTO: 0.3 % (ref 0–1.8)
BASOPHILS # BLD: 0.01 K/UL (ref 0–0.12)
BILIRUB SERPL-MCNC: 0.3 MG/DL (ref 0.1–1.5)
BILIRUB UR QL STRIP.AUTO: NEGATIVE
BUN SERPL-MCNC: 16 MG/DL (ref 8–22)
C3 SERPL-MCNC: 78 MG/DL (ref 87–200)
C4 SERPL-MCNC: 10.5 MG/DL (ref 19–52)
CALCIUM SERPL-MCNC: 8.7 MG/DL (ref 8.4–10.2)
CHLORIDE SERPL-SCNC: 86 MMOL/L (ref 96–112)
CO2 SERPL-SCNC: 32 MMOL/L (ref 20–33)
COLOR UR: YELLOW
CREAT SERPL-MCNC: 4.63 MG/DL (ref 0.5–1.4)
EOSINOPHIL # BLD AUTO: 0 K/UL (ref 0–0.51)
EOSINOPHIL NFR BLD: 0 % (ref 0–6.9)
EPI CELLS #/AREA URNS HPF: ABNORMAL /HPF
ERYTHROCYTE [DISTWIDTH] IN BLOOD BY AUTOMATED COUNT: 53.9 FL (ref 35.9–50)
ERYTHROCYTE [DISTWIDTH] IN BLOOD BY AUTOMATED COUNT: 54.1 FL (ref 35.9–50)
FERRITIN SERPL-MCNC: 572 NG/ML (ref 10–291)
GLOBULIN SER CALC-MCNC: 5.4 G/DL (ref 1.9–3.5)
GLUCOSE SERPL-MCNC: 108 MG/DL (ref 65–99)
GLUCOSE UR STRIP.AUTO-MCNC: NEGATIVE MG/DL
HCT VFR BLD AUTO: 23.4 % (ref 37–47)
HCT VFR BLD AUTO: 23.9 % (ref 37–47)
HGB BLD-MCNC: 6.5 G/DL (ref 12–16)
HGB BLD-MCNC: 6.8 G/DL (ref 12–16)
IMM GRANULOCYTES # BLD AUTO: 0.02 K/UL (ref 0–0.11)
IMM GRANULOCYTES NFR BLD AUTO: 0.7 % (ref 0–0.9)
IRON SATN MFR SERPL: 16 % (ref 15–55)
IRON SERPL-MCNC: 35 UG/DL (ref 40–170)
KETONES UR STRIP.AUTO-MCNC: NEGATIVE MG/DL
LEUKOCYTE ESTERASE UR QL STRIP.AUTO: NEGATIVE
LYMPHOCYTES # BLD AUTO: 0.49 K/UL (ref 1–4.8)
LYMPHOCYTES NFR BLD: 16.8 % (ref 22–41)
MCH RBC QN AUTO: 25.4 PG (ref 27–33)
MCH RBC QN AUTO: 25.7 PG (ref 27–33)
MCHC RBC AUTO-ENTMCNC: 27.8 G/DL (ref 33.6–35)
MCHC RBC AUTO-ENTMCNC: 28.5 G/DL (ref 33.6–35)
MCV RBC AUTO: 90.2 FL (ref 81.4–97.8)
MCV RBC AUTO: 91.4 FL (ref 81.4–97.8)
MICRO URNS: ABNORMAL
MONOCYTES # BLD AUTO: 0.11 K/UL (ref 0–0.85)
MONOCYTES NFR BLD AUTO: 3.8 % (ref 0–13.4)
MUCOUS THREADS #/AREA URNS HPF: ABNORMAL /HPF
NEUTROPHILS # BLD AUTO: 2.28 K/UL (ref 2–7.15)
NEUTROPHILS NFR BLD: 78.4 % (ref 44–72)
NITRITE UR QL STRIP.AUTO: NEGATIVE
NRBC # BLD AUTO: 0 K/UL
NRBC BLD-RTO: 0 /100 WBC
PH UR STRIP.AUTO: >=9 [PH] (ref 5–8)
PLATELET # BLD AUTO: 54 K/UL (ref 164–446)
PLATELET # BLD AUTO: 72 K/UL (ref 164–446)
PMV BLD AUTO: 11.1 FL (ref 9–12.9)
PMV BLD AUTO: 13.3 FL (ref 9–12.9)
POTASSIUM SERPL-SCNC: 4.6 MMOL/L (ref 3.6–5.5)
PROT SERPL-MCNC: 8.3 G/DL (ref 6–8.2)
PROT UR QL STRIP: 100 MG/DL
RBC # BLD AUTO: 2.56 M/UL (ref 4.2–5.4)
RBC # BLD AUTO: 2.65 M/UL (ref 4.2–5.4)
RBC # URNS HPF: ABNORMAL /HPF
RBC UR QL AUTO: ABNORMAL
SODIUM SERPL-SCNC: 129 MMOL/L (ref 135–145)
SP GR UR STRIP.AUTO: 1.01
TIBC SERPL-MCNC: 216 UG/DL (ref 250–450)
TRANS CELLS URNS QL MICRO: ABNORMAL /HPF
UIBC SERPL-MCNC: 181 UG/DL (ref 110–370)
UNIDENT CRYS URNS QL MICRO: ABNORMAL /HPF
WBC # BLD AUTO: 2.4 K/UL (ref 4.8–10.8)
WBC # BLD AUTO: 2.9 K/UL (ref 4.8–10.8)
WBC #/AREA URNS HPF: ABNORMAL /HPF

## 2021-02-06 PROCEDURE — 99232 SBSQ HOSP IP/OBS MODERATE 35: CPT | Performed by: STUDENT IN AN ORGANIZED HEALTH CARE EDUCATION/TRAINING PROGRAM

## 2021-02-06 PROCEDURE — 700102 HCHG RX REV CODE 250 W/ 637 OVERRIDE(OP): Performed by: STUDENT IN AN ORGANIZED HEALTH CARE EDUCATION/TRAINING PROGRAM

## 2021-02-06 PROCEDURE — 700105 HCHG RX REV CODE 258: Performed by: STUDENT IN AN ORGANIZED HEALTH CARE EDUCATION/TRAINING PROGRAM

## 2021-02-06 PROCEDURE — 86160 COMPLEMENT ANTIGEN: CPT | Mod: 91

## 2021-02-06 PROCEDURE — 770006 HCHG ROOM/CARE - MED/SURG/GYN SEMI*

## 2021-02-06 PROCEDURE — 85027 COMPLETE CBC AUTOMATED: CPT

## 2021-02-06 PROCEDURE — 87086 URINE CULTURE/COLONY COUNT: CPT

## 2021-02-06 PROCEDURE — 81001 URINALYSIS AUTO W/SCOPE: CPT

## 2021-02-06 PROCEDURE — 72131 CT LUMBAR SPINE W/O DYE: CPT

## 2021-02-06 PROCEDURE — A9270 NON-COVERED ITEM OR SERVICE: HCPCS | Performed by: STUDENT IN AN ORGANIZED HEALTH CARE EDUCATION/TRAINING PROGRAM

## 2021-02-06 PROCEDURE — 36430 TRANSFUSION BLD/BLD COMPNT: CPT

## 2021-02-06 PROCEDURE — 80053 COMPREHEN METABOLIC PANEL: CPT

## 2021-02-06 PROCEDURE — 81003 URINALYSIS AUTO W/O SCOPE: CPT

## 2021-02-06 PROCEDURE — 700111 HCHG RX REV CODE 636 W/ 250 OVERRIDE (IP): Performed by: STUDENT IN AN ORGANIZED HEALTH CARE EDUCATION/TRAINING PROGRAM

## 2021-02-06 PROCEDURE — P9016 RBC LEUKOCYTES REDUCED: HCPCS

## 2021-02-06 PROCEDURE — 82728 ASSAY OF FERRITIN: CPT

## 2021-02-06 PROCEDURE — 85025 COMPLETE CBC W/AUTO DIFF WBC: CPT

## 2021-02-06 RX ORDER — DIPHENHYDRAMINE HYDROCHLORIDE 50 MG/ML
25 INJECTION INTRAMUSCULAR; INTRAVENOUS
Status: COMPLETED | OUTPATIENT
Start: 2021-02-06 | End: 2021-02-06

## 2021-02-06 RX ORDER — OXYCODONE HYDROCHLORIDE 5 MG/1
5 TABLET ORAL EVERY 8 HOURS PRN
Status: DISCONTINUED | OUTPATIENT
Start: 2021-02-06 | End: 2021-02-08 | Stop reason: HOSPADM

## 2021-02-06 RX ORDER — ACETAMINOPHEN 325 MG/1
325-650 TABLET ORAL EVERY 6 HOURS PRN
Status: DISCONTINUED | OUTPATIENT
Start: 2021-02-06 | End: 2021-02-08 | Stop reason: HOSPADM

## 2021-02-06 RX ADMIN — HYDRALAZINE HYDROCHLORIDE 20 MG: 20 INJECTION INTRAMUSCULAR; INTRAVENOUS at 12:53

## 2021-02-06 RX ADMIN — ATENOLOL 25 MG: 25 TABLET ORAL at 17:36

## 2021-02-06 RX ADMIN — CEFTRIAXONE SODIUM 1 G: 1 INJECTION, POWDER, FOR SOLUTION INTRAMUSCULAR; INTRAVENOUS at 05:27

## 2021-02-06 RX ADMIN — OXYCODONE HYDROCHLORIDE 5 MG: 5 TABLET ORAL at 20:43

## 2021-02-06 RX ADMIN — PREDNISONE 30 MG: 10 TABLET ORAL at 17:37

## 2021-02-06 RX ADMIN — HEPARIN SODIUM 5000 UNITS: 5000 INJECTION, SOLUTION INTRAVENOUS; SUBCUTANEOUS at 05:26

## 2021-02-06 RX ADMIN — OMEPRAZOLE 20 MG: 20 CAPSULE, DELAYED RELEASE ORAL at 05:26

## 2021-02-06 RX ADMIN — AMLODIPINE BESYLATE 10 MG: 5 TABLET ORAL at 17:36

## 2021-02-06 RX ADMIN — ONDANSETRON 4 MG: 2 INJECTION INTRAMUSCULAR; INTRAVENOUS at 20:00

## 2021-02-06 RX ADMIN — HEPARIN SODIUM 5000 UNITS: 5000 INJECTION, SOLUTION INTRAVENOUS; SUBCUTANEOUS at 20:23

## 2021-02-06 RX ADMIN — ACETAMINOPHEN 650 MG: 325 TABLET, FILM COATED ORAL at 12:05

## 2021-02-06 RX ADMIN — DIPHENHYDRAMINE HYDROCHLORIDE 25 MG: 50 INJECTION, SOLUTION INTRAMUSCULAR; INTRAVENOUS at 12:04

## 2021-02-06 RX ADMIN — FUROSEMIDE 40 MG: 40 TABLET ORAL at 05:26

## 2021-02-06 RX ADMIN — SUCRALFATE 1 G: 1 SUSPENSION ORAL at 17:38

## 2021-02-06 RX ADMIN — FUROSEMIDE 40 MG: 40 TABLET ORAL at 17:36

## 2021-02-06 RX ADMIN — SENNOSIDES AND DOCUSATE SODIUM 2 TABLET: 8.6; 5 TABLET ORAL at 05:26

## 2021-02-06 RX ADMIN — OMEPRAZOLE 20 MG: 20 CAPSULE, DELAYED RELEASE ORAL at 17:37

## 2021-02-06 RX ADMIN — SUCRALFATE 1 G: 1 SUSPENSION ORAL at 05:26

## 2021-02-06 ASSESSMENT — FIBROSIS 4 INDEX: FIB4 SCORE: 3.47

## 2021-02-06 ASSESSMENT — ENCOUNTER SYMPTOMS
DIARRHEA: 0
BACK PAIN: 1
MYALGIAS: 0
CHILLS: 0
HEMOPTYSIS: 0
NAUSEA: 0
FEVER: 1
VOMITING: 0
BLURRED VISION: 0
BRUISES/BLEEDS EASILY: 0
FOCAL WEAKNESS: 0
COUGH: 0
PALPITATIONS: 0
DEPRESSION: 0

## 2021-02-06 ASSESSMENT — PAIN DESCRIPTION - PAIN TYPE: TYPE: ACUTE PAIN

## 2021-02-06 NOTE — PROGRESS NOTES
Spoke with Dr. Styles regarding blood administration orders.  Per conversation with Dr. Styles hold administering blood until after morning CBC resulted.  If HgB 6.7 or greater do not administer blood.  Patient updated on POC

## 2021-02-06 NOTE — H&P
"Hospital Medicine History & Physical Note    Date of Service  2/5/2021    Primary Care Physician  Ella Matthew M.D.    Consultants  None    Code Status  Full Code    Chief Complaint  Chief Complaint   Patient presents with   • Low Back Pain   • Fever   • Flank Pain       History of Presenting Illness  25 y.o. female with past medical history of lupus complicated by lupus nephritis/end-stage renal disease on hemodialysis Monday Wednesday Friday, history of peptic ulcer disease, GI bleed, severe chronic anemia, hypertension and seizures presents emergency department on 2/5/2021 with a 1 day history of lower back pain and fatigue.  Patient reported that she went to dialysis today and after dialysis she started having lower back pain.  She describes it as a sharp pain in her lower back and and reports tenderness to palpation prior to receiving opiates at ED.  She associates it with dyspnea on exertion.  She has noticed increase \"puffiness\" in both hands with some morning stiffness as well as bilateral knee pains.  She saw her rheumatologist last month which she is on mycophenolate, Plaquenil and prednisone which she reports compliance. Patient denies any orthostatic changes or feeling of dizziness.  No chest pain, palpitations, headaches, nausea or inability to tolerate meals, bloating, abdominal pain, hematochezia, hematemesis or melena.  No focal weakness or numbness.  No changes in vision or pleurisies.    At the emergency department, vital signs with a 101 fever, hypotension SBP in the 160s and tachycardia heart rate in the 110s.  EKG showing sinus tachycardia.  CBC with normocytic anemia with elevated RDW, elevated polys.  Chemistry with mild hyponatremia, hypochlorhydria and creatinine consistent with FELIZ.  The catheter for which down trended to 1.8 in the absence of IV hydration.  Procalcitonin 1.65 and CRP 1.4 troponin 931.  Chest x-ray showing cardiomegaly and CT renal colic showing dilated duodenum and " proximal small bowel as well as mild increase colonic stool.  Blood and urine samples were collected for culture. She received 2 doses of morphine, 1 dose of IV Zofran and was started on ceftriaxone antibiotic regimen.  Patient was admitted for sepsis of unknown etiology.    Review of Systems  Review of Systems   Constitutional: Positive for fever.   HENT: Negative.    Eyes: Negative.    Respiratory: Positive for shortness of breath. Negative for cough.    Cardiovascular: Negative.    Gastrointestinal: Negative.    Genitourinary: Negative.    Musculoskeletal: Positive for back pain and joint pain. Negative for falls and neck pain.   Skin: Negative.    Neurological: Negative.    Endo/Heme/Allergies: Negative.    Psychiatric/Behavioral: Negative.        Past Medical History   has a past medical history of Anemia (01/17/2018), AVF (arteriovenous fistula) (Formerly [facility]), Dialysis patient (Formerly [facility]), ESRD (end stage renal disease) on dialysis ([facility]) (01/17/2018), Heart burn, Hypertension (01/17/2018), Indigestion, Lupus ([facility]), Migraines (01/17/2018), and Seizure (Formerly [facility]) (2013).    Surgical History   has a past surgical history that includes ronak by laparoscopy (4/5/2010); av fistula creation (Right); angioplasty (01/17/2018); other; other; gastroscopy-endo (12/9/2019); gastroscopy (N/A, 5/30/2020); gastroscopy-endo (9/18/2020); pr upper gi endoscopy,ctrl bleed (11/12/2020); pr upper gi endoscopy,biopsy (11/12/2020); gastroscopy-endo (11/12/2020); pr colonoscopy,diagnostic (1/8/2021); pr upper gi endoscopy,diagnosis (1/8/2021); and pr upper gi endoscopy,ctrl bleed (1/8/2021).     Family History  family history includes Diabetes in her paternal grandmother.     Social History   reports that she has never smoked. She has never used smokeless tobacco. She reports that she does not drink alcohol or use drugs.    Allergies  Allergies   Allergen Reactions   • Cephalexin Rash     .   • Clindamycin Rash     .   • Methylprednisolone Unspecified      Anxious   • Metoprolol Rash     .       Medications  Prior to Admission Medications   Prescriptions Last Dose Informant Patient Reported? Taking?   acetaminophen (TYLENOL) 500 MG Tab PRN at PRN Patient Yes No   Sig: Take 500 mg by mouth every 6 hours as needed for Moderate Pain.   amLODIPine (NORVASC) 10 MG Tab 2/4/2021 at PM Patient Yes No   Sig: Take 10 mg by mouth every evening.   atenolol (TENORMIN) 25 MG Tab 2/4/2021 at PM Patient Yes No   Sig: Take 25 mg by mouth every evening.   furosemide (LASIX) 40 MG Tab 2/4/2021 at PM Patient Yes No   Sig: Take 40 mg by mouth 2 (two) times a day.   hydroxychloroquine (PLAQUENIL) 200 MG Tab 2/4/2021 at PM Patient Yes No   Sig: Take 200 mg by mouth every evening.   mycophenolate (MYFORTIC) 180 MG EC tablet 2/4/2021 at PM Patient No No   Sig: Take 1 Tab by mouth every evening.   ondansetron (ZOFRAN ODT) 4 MG TABLET DISPERSIBLE PRN at PRN Patient No No   Sig: Take 1 Tab by mouth every four hours as needed for Nausea (give PO if no IV route available).   pantoprazole (PROTONIX) 40 MG Tablet Delayed Response 2/4/2021 at PM Patient No No   Sig: Take 1 Tab by mouth 2 times a day.   predniSONE (DELTASONE) 5 MG Tab 2/4/2021 at PM Patient Yes No   Sig: Take 5 mg by mouth every evening.   sucralfate (CARAFATE) 1 GM/10ML Suspension 2/4/2021 at PM Patient No No   Sig: Take 10 mL by mouth 4 Times a Day,Before Meals and at Bedtime.   Patient taking differently: Take 1 g by mouth 2 times a day.      Facility-Administered Medications: None       Physical Exam  Temp:  [38.3 °C (101 °F)] 38.3 °C (101 °F)  Pulse:  [] 117  Resp:  [17-20] 17  BP: (125-168)/() 164/105  SpO2:  [93 %-100 %] 95 %    Physical Exam  Constitutional:       General: She is not in acute distress.     Appearance: Normal appearance. She is normal weight. She is not ill-appearing, toxic-appearing or diaphoretic.   HENT:      Head: Normocephalic and atraumatic.      Mouth/Throat:      Mouth: Mucous membranes  "are dry.   Eyes:      Extraocular Movements: Extraocular movements intact.      Pupils: Pupils are equal, round, and reactive to light.   Neck:      Musculoskeletal: Normal range of motion and neck supple.   Cardiovascular:      Rate and Rhythm: Regular rhythm. Tachycardia present.      Pulses: Normal pulses.      Heart sounds: Normal heart sounds.   Pulmonary:      Effort: Pulmonary effort is normal.      Breath sounds: Normal breath sounds.   Abdominal:      General: Bowel sounds are normal.      Palpations: Abdomen is soft.      Tenderness: There is no abdominal tenderness. There is no guarding or rebound.   Musculoskeletal: Normal range of motion.         General: Swelling and tenderness present.      Right lower leg: No edema.      Left lower leg: No edema.      Comments: Right arm fistula with thrill, no erythema or swelling.  Bilateral hand \"puffiness\", mild tenderness at bilateral first and second MCP  Left knee nodule, nontender  Mild lumbar spine tenderness on palpation, no swelling erythema or purulent secretion noted   Skin:     General: Skin is warm.      Coloration: Skin is not jaundiced.   Neurological:      General: No focal deficit present.      Mental Status: She is alert and oriented to person, place, and time. Mental status is at baseline.      Cranial Nerves: No cranial nerve deficit.   Psychiatric:         Mood and Affect: Mood normal.         Behavior: Behavior normal.         Thought Content: Thought content normal.         Judgment: Judgment normal.         Laboratory:  Recent Labs     02/04/21  1013 02/05/21  1408   WBC 2.7* 7.1   RBC 2.57* 2.80*   HEMOGLOBIN 6.8* 7.4*   HEMATOCRIT 23.5* 25.4*   MCV 91.4 90.7   MCH 26.5* 26.4*   MCHC 28.9* 29.1*   RDW 55.5* 53.8*   PLATELETCT 94* 94*   MPV 11.5 11.3     Recent Labs     02/05/21  1408   SODIUM 132*   POTASSIUM 3.9   CHLORIDE 89*   CO2 29   GLUCOSE 87   BUN 9   CREATININE 3.05*   CALCIUM 8.8     Recent Labs     02/05/21  1408   ALTSGPT 11 "   ASTSGOT 34   ALKPHOSPHAT 75   TBILIRUBIN 0.5   LIPASE 20   GLUCOSE 87     Recent Labs     02/05/21  1408   APTT 28.5   INR 1.10     No results for input(s): NTPROBNP in the last 72 hours.      Recent Labs     02/05/21  1408   TROPONINT 931*       Imaging:  CT-RENAL COLIC EVALUATION(A/P W/O)   Final Result      1.  Atrophic kidneys with probable cysts, similar to prior exam.  Mass is not excluded on noncontrast study.   2.  Dilated duodenum and proximal small bowel may indicate early or partial obstruction.   3.  Mildly increased colonic stool.   4.  Normal appendix.   5.  LEFT ovarian cyst versus dominant follicle.      DX-CHEST-PORTABLE (1 VIEW)   Final Result      Cardiomegaly.            Assessment/Plan:  I anticipate this patient will require at least two midnights for appropriate medical management, necessitating inpatient admission.    * Sepsis (Former[facility])  Assessment & Plan  This is Sepsis Present on admission  SIRS criteria identified on my evaluation include: Fever, with temperature greater than 101 deg F, Tachycardia, with heart rate greater than 90 BPM and Tachypnea, with respirations greater than 20 per minute  Source is still unknown, possibly urine versus GI  Sepsis protocol initiated  Fluid resuscitation ordered per protocol  IV antibiotics as appropriate for source of sepsis  While organ dysfunction may be noted elsewhere in this problem list or in the chart, degree of organ dysfunction does not meet CMS criteria for severe sepsis    SIRS 3/4, qSOFA 0/3  Chief complaint back pain with tenderness to palpation  No pneumonia on chest x-ray  Renal CT without hydronephrosis or pyelonephritis, did show duodenal distention  Elevated CRP and procalcitonin in the setting of CKD  Pending UA  Blood and urine samples collected at ED  Continue Rocephin antibiotic regimen for now  Follow blood cultures  Labs in a.m.    Lupus ([facility])- (present on admission)  Assessment & Plan  Fever, and hand/back arthralgia concerning  for mild-mod SLE flair   Continue home Mycophenolate, Plaquenil and Prednisone  Pending EDUARDO with reflex  Pending C3/C4  Labs on AM    Acute on chronic anemia- (present on admission)  Assessment & Plan  History of upper GI bleed secondary to gastritis noted on  Patient denies hematochezia, melena or hematochezia   Tachycardic, fatigue  Hemoglobin better today, likely 2/2 HD  Repeat iron studies  FOBT  Repeat H/H  Labs in a.m.    Elevated troponin- (present on admission)  Assessment & Plan  Troponin in the 900s, at baseline  Patient denies chest pain  EKG with sinus tachycardia  Repeat troponin    ESRD (end stage renal disease) on dialysis (HCC)- (present on admission)  Assessment & Plan  Secondary to lupus nephritis  Continue Monday Wednesday Friday HD  As hemodialysis today in the morning  Day team to consult nephrology    Hyponatremia- (present on admission)  Assessment & Plan  Mild hyponatremia  Labs in a.m.    Gastroesophageal reflux disease without esophagitis- (present on admission)  Assessment & Plan  Continue home omeprazole    Hypertension- (present on admission)  Assessment & Plan  Continue home amlodipine and atenolol  As needed hydralazine    DVT prophylaxis Heparin

## 2021-02-06 NOTE — ASSESSMENT & PLAN NOTE
Elevated ALT/AST/ALT with normal bilirubin, trending down  Acute hepatitis panel neg 11/2020  Will cont monitoring

## 2021-02-06 NOTE — DISCHARGE PLANNING
Care Transition Team Assessment      CTT assessment at bedside in ER with patient and her mother.   Pt with history of multiple admits r/t lupus diagnosis at 14 years. Pt is a dialysis pt. Pt is primarily independent at home, she lives with her parents and sister. When she needs assistance it is provided by her family. Pt reports one of her parents are home with most of the time, she does have a medical alarm pendant if she is alone. MOP reports great concern and fear when pt is home alone. OP pharmacy is [facility] on [facility] in [place]. Pt tearful off and on during interview, discussed stressors and mental health concerns. Encouraged pt to seek OP therapy. Discussed resources verbally with pt and MOP. Pt and MOP deny needs at this time.  IP CM team will monitor for ongoing needs.        Information Source  Orientation : Oriented x 4  Information Given By: Patient, Parent  Who is responsible for making decisions for patient? : Patient    Readmission Evaluation  Is this a readmission?: Yes - unplanned readmission    Elopement Risk  Legal Hold: No  Ambulatory or Self Mobile in Wheelchair: No-Not an Elopement Risk  Elopement Risk: Not at Risk for Elopement    Interdisciplinary Discharge Planning  Does Admitting Nurse Feel This Could be a Complex Discharge?: No  Primary Care Physician: (Ella Matthew)  Lives with - Patient's Self Care Capacity: Parents  Support Systems: Family Member(s), Friends / Neighbors, Parent  Housing / Facility: [address]  Do You Take your Prescribed Medications Regularly: Yes  Able to Return to Previous ADL's: Future Time w/Therapy  Mobility Issues: No  Prior Services: Home With Outpatient Therapy(Dialysis)  Patient Prefers to be Discharged to:: (home)  Assistance Needed: Yes    Discharge Preparedness  What is your plan after discharge?: Home with help  What are your discharge supports?: Parent  Prior Functional Level: Independent with Activities of Daily Living, Needs Assist with  Activities of Daily Living, Independent with Medication Management  Difficulity with ADLs: Other  Difficulty with ADLs Comment: (PRN assistance with ADL's family helps, )  Difficulity with IADLs: Other  Difficulity with IADL Comments: (PRN assistance with ADL's from family)    Functional Assesment  Prior Functional Level: Independent with Activities of Daily Living, Needs Assist with Activities of Daily Living, Independent with Medication Management    Finances  Financial Barriers to Discharge: No  Prescription Coverage: Yes                   Domestic Abuse  Have you ever been the victim of abuse or violence?: No  Physical Abuse or Sexual Abuse: No  Verbal Abuse or Emotional Abuse: No  Possible Abuse/Neglect Reported to:: Not Applicable    Psychological Assessment  History of Substance Abuse: None  History of Psychiatric Problems: No  Non-compliant with Treatment: No  Newly Diagnosed Illness: No    Discharge Risks or Barriers  Discharge risks or barriers?: Complex medical needs  Patient risk factors: Complex medical needs

## 2021-02-06 NOTE — PROGRESS NOTES
Patient arrived to floor via ER transport.  Patient is febrile upon arrival with a temp of 102.8.  Patient is hypertensive (see vital flowsheet).  Patient denies any pain or SOB.  Patient vomited small amount of emesis upon arrival.  Physician is notified and requested IV nausea medications and IV BP medications.  Patient is medicated for nausea and nausea is relieved so PO BP medications were administered.  Patient tolerated small sips of PO and PO medications.  Will continue to monitor.

## 2021-02-06 NOTE — PROGRESS NOTES
Received patient from NOC RN. Assessment complete. A&Ox4. Denies pain. POC discussed. Call light within reach. Bed in low, locked position. All needs attended to at this time.     Pt's Hgb 6.5. NOC MD notified by NOC RN. Day shift RN notified hospitalist for order clarification.    3814 Orders received

## 2021-02-06 NOTE — ASSESSMENT & PLAN NOTE
Fever, and hand/back arthralgia concerning for mild-mod SLE flair   Continue home Mycophenolate, Plaquenil and Prednisone  Pending EDUARDO with reflex  C3/C4 low

## 2021-02-06 NOTE — ED NOTES
hospitalist made aware of current vitals. Pt not symptomatic at this time. Per hospitalist continue to monitor and he will see pt at bedside.

## 2021-02-06 NOTE — PROGRESS NOTES
Telemetry Shift Summary     Rhythm: NSR  HR Range: 66  Ectopy: N/A     Measurements for strip printed 0259 :  HR 66    0.20/0.08/0.40

## 2021-02-06 NOTE — PROGRESS NOTES
"Hospital Medicine Daily Progress Note    Date of Service  2/6/2021    Chief Complaint  25 y.o. female admitted 2/5/2021 with   Chief Complaint   Patient presents with   • Low Back Pain   • Fever   • Flank Pain         Hospital Course    25 y.o. female with past medical history of lupus complicated by lupus nephritis/end-stage renal disease on hemodialysis Monday Wednesday Friday, history of peptic ulcer disease, GI bleed, severe chronic anemia, hypertension and seizures presents emergency department on 2/5/2021 with a 1 day history of lower back pain and fatigue.  Patient reported that she went to dialysis and after dialysis she started having lower back pain.  She describes it as a sharp pain in her lower back and and reports tenderness to palpation prior to receiving opiates at ED.  She associates it with dyspnea on exertion.  She has noticed increase \"puffiness\" in both hands with some morning stiffness as well as bilateral knee pains.  She saw her rheumatologist last month which she is on mycophenolate, Plaquenil and prednisone which she reports compliance. Patient denies any orthostatic changes or feeling of dizziness.       At the emergency department, vital signs with a 101 fever, hypertension SBP in the 160s and tachycardia heart rate in the 110s.  The LA trended to 1.8 in the absence of IV hydration.  Procalcitonin 1.65 and CRP 1.4 troponin 931.  Chest x-ray showing cardiomegaly and CT renal colic showing dilated duodenum and proximal small bowel as well as mild increase colonic stool.  Blood culture neg. She received 2 doses of morphine, 1 dose of IV Zofran and was started on ceftriaxone antibiotic regimen.      Interval Problem Update  Patient was seen and examined at the bedside. No overnight events reported.  VS reviewed.   Patient reports back pain improving, no chest pain, sob, nausea, vomiting, dysuria or diarrhea    Consultants/Specialty  nephrology    Code Status  Full " Code    Disposition  TBD    Review of Systems  Review of Systems   Constitutional: Positive for fever. Negative for chills.   HENT: Negative for ear discharge.    Eyes: Negative for blurred vision.   Respiratory: Negative for cough and hemoptysis.    Cardiovascular: Negative for chest pain and palpitations.   Gastrointestinal: Negative for diarrhea, nausea and vomiting.   Genitourinary: Negative for dysuria.   Musculoskeletal: Positive for back pain. Negative for myalgias.   Skin: Negative for rash.   Neurological: Negative for focal weakness.   Endo/Heme/Allergies: Does not bruise/bleed easily.   Psychiatric/Behavioral: Negative for depression.        Physical Exam  Temp:  [36.4 °C (97.5 °F)-39.4 °C (102.9 °F)] 36.6 °C (97.8 °F)  Pulse:  [] 88  Resp:  [16-28] 18  BP: (127-178)/() 130/87  SpO2:  [90 %-100 %] 94 %    Physical Exam  Vitals signs and nursing note reviewed.   Constitutional:       General: She is not in acute distress.     Appearance: Normal appearance.   HENT:      Head: Normocephalic and atraumatic.      Nose: Nose normal.      Mouth/Throat:      Mouth: Mucous membranes are dry.      Pharynx: Oropharynx is clear.   Eyes:      Extraocular Movements: Extraocular movements intact.      Conjunctiva/sclera: Conjunctivae normal.      Pupils: Pupils are equal, round, and reactive to light.   Neck:      Musculoskeletal: Normal range of motion and neck supple.   Cardiovascular:      Rate and Rhythm: Normal rate and regular rhythm.      Pulses: Normal pulses.      Heart sounds: Normal heart sounds.   Pulmonary:      Effort: Pulmonary effort is normal. No respiratory distress.      Breath sounds: Normal breath sounds. No wheezing or rales.   Abdominal:      General: Abdomen is flat. Bowel sounds are normal.      Palpations: Abdomen is soft.   Musculoskeletal: Normal range of motion.         General: No swelling or tenderness.      Right lower leg: No edema.      Left lower leg: No edema.   Skin:      General: Skin is warm and dry.   Neurological:      General: No focal deficit present.      Mental Status: She is alert and oriented to person, place, and time. Mental status is at baseline.   Psychiatric:         Mood and Affect: Mood normal.         Behavior: Behavior normal.         Fluids    Intake/Output Summary (Last 24 hours) at 2/6/2021 1355  Last data filed at 2/5/2021 1531  Gross per 24 hour   Intake 100 ml   Output --   Net 100 ml       Laboratory  Recent Labs     02/05/21  1818 02/06/21  0313 02/06/21  1226   WBC 5.8 2.9* 2.4*   RBC 2.59* 2.56* 2.65*   HEMOGLOBIN 6.7* 6.5* 6.8*   HEMATOCRIT 23.7* 23.4* 23.9*   MCV 91.5 91.4 90.2   MCH 25.9* 25.4* 25.7*   MCHC 28.3* 27.8* 28.5*   RDW 55.8* 54.1* 53.9*   PLATELETCT 68* 54* 72*   MPV 11.6 11.1 13.3*     Recent Labs     02/05/21  1408 02/06/21  0313   SODIUM 132* 129*   POTASSIUM 3.9 4.6   CHLORIDE 89* 86*   CO2 29 32   GLUCOSE 87 108*   BUN 9 16   CREATININE 3.05* 4.63*   CALCIUM 8.8 8.7     Recent Labs     02/05/21  1408   APTT 28.5   INR 1.10               Imaging  CT-RENAL COLIC EVALUATION(A/P W/O)   Final Result      1.  Atrophic kidneys with probable cysts, similar to prior exam.  Mass is not excluded on noncontrast study.   2.  Dilated duodenum and proximal small bowel may indicate early or partial obstruction.   3.  Mildly increased colonic stool.   4.  Normal appendix.   5.  LEFT ovarian cyst versus dominant follicle.      DX-CHEST-PORTABLE (1 VIEW)   Final Result      Cardiomegaly.      CT-LSPINE W/O PLUS RECONS    (Results Pending)        Assessment/Plan  * Sepsis (HCC)  Assessment & Plan  This is Sepsis Present on admission  SIRS criteria identified on my evaluation include: Fever, with temperature greater than 101 deg F, Tachycardia, with heart rate greater than 90 BPM and Tachypnea, with respirations greater than 20 per minute  Source is still unknown, possibly urine versus GI  Sepsis protocol initiated  Fluid resuscitation ordered per  protocol  IV antibiotics as appropriate for source of sepsis  While organ dysfunction may be noted elsewhere in this problem list or in the chart, degree of organ dysfunction does not meet CMS criteria for severe sepsis    SIRS 3/4, qSOFA 0/3  Chief complaint back pain with tenderness to palpation  No pneumonia on chest x-ray  Renal CT without hydronephrosis or pyelonephritis, did show duodenal distention  Elevated CRP and procalcitonin in the setting of CKD  Pending UA  Blood and urine samples collected at ED  Continue Rocephin antibiotic regimen for now  Follow blood cultures      Lupus (HCC)- (present on admission)  Assessment & Plan  Fever, and hand/back arthralgia concerning for mild-mod SLE flair   Continue home Mycophenolate, Plaquenil and Prednisone  Pending EDUARDO with reflex  C3/C4 low      Acute on chronic anemia- (present on admission)  Assessment & Plan  History of upper GI bleed secondary to gastritis noted on  Patient denies hematochezia, melena or hematochezia   Tachycardic, fatigue  Hemoglobin better today, likely 2/2 HD and underlying lupus  Repeat iron studies  FOBT  S/p transfusion 2/6  Monitor cbc    Elevated troponin- (present on admission)  Assessment & Plan  Troponin in the 900s, at baseline  Patient denies chest pain  EKG with sinus tachycardia  Cont monitoring    ESRD (end stage renal disease) on dialysis (HCC)- (present on admission)  Assessment & Plan  Secondary to lupus nephritis  Continue Monday Wednesday Friday HD  Nephrology consulted to resume HD in house    Transaminitis  Assessment & Plan  Elevated ALT/AST/ALT with normal bilirubin  Abdomen exam benign, denies abdominal pain, tolerating with diet  Will cont monitoring    Back pain  Assessment & Plan  Reported severe back pain on admission  Tenderness on the lower back on exam  Denies IVDU  ESR, CRP elevated  Check L lumbar to r/o discitis    Hyponatremia- (present on admission)  Assessment & Plan  Asymptomatic  Hyponatremia sec to  fluid overload in the setting of ESRD  Dialysis as scheduled  Cont monitoring    Gastroesophageal reflux disease without esophagitis- (present on admission)  Assessment & Plan  Continue home omeprazole    Hypertension- (present on admission)  Assessment & Plan  Continue home amlodipine and atenolol  As needed hydralazine       VTE prophylaxis: heparin

## 2021-02-06 NOTE — ASSESSMENT & PLAN NOTE
This is Sepsis Present on admission  SIRS criteria identified on my evaluation include: Fever, with temperature greater than 101 deg F, Tachycardia, with heart rate greater than 90 BPM and Tachypnea, with respirations greater than 20 per minute  Source is still unknown, possibly urine versus GI  Sepsis protocol initiated  Fluid resuscitation ordered per protocol  IV antibiotics as appropriate for source of sepsis  While organ dysfunction may be noted elsewhere in this problem list or in the chart, degree of organ dysfunction does not meet CMS criteria for severe sepsis    SIRS 3/4, qSOFA 0/3  Chief complaint back pain with tenderness to palpation  No pneumonia on chest x-ray  Renal CT without hydronephrosis or pyelonephritis, did show duodenal distention  Elevated CRP and procalcitonin in the setting of CKD  UA with bacteria, urine culture pending  Blood and urine samples collected at ED  Continue Rocephin antibiotic regimen for now  Follow blood cultures neg

## 2021-02-06 NOTE — PROGRESS NOTES
Spoke with Dr. Styles and notified him of HgB of 6.5.  Patient states that she is no longer having feelings or symptoms of dizziness or weakness.  Patient states that HgB results are her normal between Hemodialysis appointments.  Per discussions with Dr. Styles patients risk for benefits for blood transfusion vs risk of fluid overload d/t ESRD/dialysis dependent status do not warrant a blood transfusion in a asymptomatic patient.  Patient is updated and agrees with POC.  Orders to repeat CBC in 4h.

## 2021-02-06 NOTE — ASSESSMENT & PLAN NOTE
Troponin in the 900s, at baseline  Patient denies chest pain  EKG with sinus tachycardia  Cont monitoring

## 2021-02-06 NOTE — ASSESSMENT & PLAN NOTE
History of upper GI bleed secondary to gastritis noted on  Patient denies hematochezia, melena or hematochezia   Tachycardic, fatigue  Hemoglobin better today, likely 2/2 HD and underlying lupus  Repeat iron studies  FOBT  S/p transfusion 2/6  Monitor cbc

## 2021-02-06 NOTE — ED NOTES
Pt placed on 2 Liter of oxygen. Not able to get a good oxygen reading. hospitalist made aware. Pt denies increase SOB. Lungs sounds are clear and diminished on Left lower lobe.     Attempted to call report, receiving RN unable to receive report at this time.

## 2021-02-06 NOTE — ASSESSMENT & PLAN NOTE
Secondary to lupus nephritis  Continue Monday Wednesday Friday HD  Nephrology consulted to resume HD in house

## 2021-02-06 NOTE — ASSESSMENT & PLAN NOTE
Reported severe back pain on admission  Tenderness on the lower back on exam  Denies IVDU  ESR,75 CRP 1.4  Check L lumbar: no acute abnormalities

## 2021-02-06 NOTE — ASSESSMENT & PLAN NOTE
Asymptomatic  Hyponatremia sec to fluid overload in the setting of ESRD  Dialysis as scheduled  Cont monitoring

## 2021-02-07 ENCOUNTER — APPOINTMENT (OUTPATIENT)
Dept: RADIOLOGY | Facility: MEDICAL CENTER | Age: 26
DRG: 871 | End: 2021-02-07
Attending: STUDENT IN AN ORGANIZED HEALTH CARE EDUCATION/TRAINING PROGRAM
Payer: COMMERCIAL

## 2021-02-07 LAB
ALBUMIN SERPL BCP-MCNC: 2.8 G/DL (ref 3.2–4.9)
ALBUMIN/GLOB SERPL: 0.6 G/DL
ALP SERPL-CCNC: 142 U/L (ref 30–99)
ALT SERPL-CCNC: 83 U/L (ref 2–50)
ANION GAP SERPL CALC-SCNC: 12 MMOL/L (ref 7–16)
AST SERPL-CCNC: 84 U/L (ref 12–45)
BASOPHILS # BLD AUTO: 0.4 % (ref 0–1.8)
BASOPHILS # BLD: 0.02 K/UL (ref 0–0.12)
BILIRUB SERPL-MCNC: 0.4 MG/DL (ref 0.1–1.5)
BUN SERPL-MCNC: 37 MG/DL (ref 8–22)
CALCIUM SERPL-MCNC: 8.3 MG/DL (ref 8.4–10.2)
CHLORIDE SERPL-SCNC: 88 MMOL/L (ref 96–112)
CO2 SERPL-SCNC: 30 MMOL/L (ref 20–33)
CREAT SERPL-MCNC: 6.26 MG/DL (ref 0.5–1.4)
EOSINOPHIL # BLD AUTO: 0.04 K/UL (ref 0–0.51)
EOSINOPHIL NFR BLD: 0.9 % (ref 0–6.9)
ERYTHROCYTE [DISTWIDTH] IN BLOOD BY AUTOMATED COUNT: 50.9 FL (ref 35.9–50)
GLOBULIN SER CALC-MCNC: 4.6 G/DL (ref 1.9–3.5)
GLUCOSE SERPL-MCNC: 93 MG/DL (ref 65–99)
HCT VFR BLD AUTO: 27 % (ref 37–47)
HGB BLD-MCNC: 8.2 G/DL (ref 12–16)
IMM GRANULOCYTES # BLD AUTO: 0.02 K/UL (ref 0–0.11)
IMM GRANULOCYTES NFR BLD AUTO: 0.4 % (ref 0–0.9)
LYMPHOCYTES # BLD AUTO: 0.99 K/UL (ref 1–4.8)
LYMPHOCYTES NFR BLD: 21.1 % (ref 22–41)
MCH RBC QN AUTO: 27.2 PG (ref 27–33)
MCHC RBC AUTO-ENTMCNC: 30.4 G/DL (ref 33.6–35)
MCV RBC AUTO: 89.4 FL (ref 81.4–97.8)
MONOCYTES # BLD AUTO: 0.33 K/UL (ref 0–0.85)
MONOCYTES NFR BLD AUTO: 7 % (ref 0–13.4)
NEUTROPHILS # BLD AUTO: 3.29 K/UL (ref 2–7.15)
NEUTROPHILS NFR BLD: 70.2 % (ref 44–72)
NRBC # BLD AUTO: 0 K/UL
NRBC BLD-RTO: 0 /100 WBC
PLATELET # BLD AUTO: 78 K/UL (ref 164–446)
PMV BLD AUTO: 12.8 FL (ref 9–12.9)
POTASSIUM SERPL-SCNC: 4.1 MMOL/L (ref 3.6–5.5)
PROT SERPL-MCNC: 7.4 G/DL (ref 6–8.2)
RBC # BLD AUTO: 3.02 M/UL (ref 4.2–5.4)
SODIUM SERPL-SCNC: 130 MMOL/L (ref 135–145)
WBC # BLD AUTO: 4.7 K/UL (ref 4.8–10.8)

## 2021-02-07 PROCEDURE — A9270 NON-COVERED ITEM OR SERVICE: HCPCS | Performed by: STUDENT IN AN ORGANIZED HEALTH CARE EDUCATION/TRAINING PROGRAM

## 2021-02-07 PROCEDURE — 74018 RADEX ABDOMEN 1 VIEW: CPT

## 2021-02-07 PROCEDURE — 700111 HCHG RX REV CODE 636 W/ 250 OVERRIDE (IP): Performed by: STUDENT IN AN ORGANIZED HEALTH CARE EDUCATION/TRAINING PROGRAM

## 2021-02-07 PROCEDURE — 700111 HCHG RX REV CODE 636 W/ 250 OVERRIDE (IP): Performed by: INTERNAL MEDICINE

## 2021-02-07 PROCEDURE — 85025 COMPLETE CBC W/AUTO DIFF WBC: CPT

## 2021-02-07 PROCEDURE — 99254 IP/OBS CNSLTJ NEW/EST MOD 60: CPT | Performed by: INTERNAL MEDICINE

## 2021-02-07 PROCEDURE — 770006 HCHG ROOM/CARE - MED/SURG/GYN SEMI*

## 2021-02-07 PROCEDURE — 700102 HCHG RX REV CODE 250 W/ 637 OVERRIDE(OP): Performed by: STUDENT IN AN ORGANIZED HEALTH CARE EDUCATION/TRAINING PROGRAM

## 2021-02-07 PROCEDURE — 99232 SBSQ HOSP IP/OBS MODERATE 35: CPT | Performed by: STUDENT IN AN ORGANIZED HEALTH CARE EDUCATION/TRAINING PROGRAM

## 2021-02-07 PROCEDURE — 700105 HCHG RX REV CODE 258: Performed by: STUDENT IN AN ORGANIZED HEALTH CARE EDUCATION/TRAINING PROGRAM

## 2021-02-07 PROCEDURE — 80053 COMPREHEN METABOLIC PANEL: CPT

## 2021-02-07 RX ORDER — LORAZEPAM 2 MG/ML
0.5 INJECTION INTRAMUSCULAR EVERY 4 HOURS PRN
Status: DISCONTINUED | OUTPATIENT
Start: 2021-02-07 | End: 2021-02-08 | Stop reason: HOSPADM

## 2021-02-07 RX ADMIN — LORAZEPAM 0.5 MG: 2 INJECTION INTRAMUSCULAR; INTRAVENOUS at 10:56

## 2021-02-07 RX ADMIN — SENNOSIDES AND DOCUSATE SODIUM 2 TABLET: 8.6; 5 TABLET ORAL at 04:55

## 2021-02-07 RX ADMIN — HEPARIN SODIUM 5000 UNITS: 5000 INJECTION, SOLUTION INTRAVENOUS; SUBCUTANEOUS at 23:00

## 2021-02-07 RX ADMIN — FUROSEMIDE 40 MG: 40 TABLET ORAL at 18:25

## 2021-02-07 RX ADMIN — ATENOLOL 25 MG: 25 TABLET ORAL at 18:26

## 2021-02-07 RX ADMIN — SUCRALFATE 1 G: 1 SUSPENSION ORAL at 04:55

## 2021-02-07 RX ADMIN — FUROSEMIDE 40 MG: 40 TABLET ORAL at 04:55

## 2021-02-07 RX ADMIN — PREDNISONE 30 MG: 10 TABLET ORAL at 18:25

## 2021-02-07 RX ADMIN — LORAZEPAM 0.5 MG: 2 INJECTION INTRAMUSCULAR; INTRAVENOUS at 19:49

## 2021-02-07 RX ADMIN — OXYCODONE HYDROCHLORIDE 5 MG: 5 TABLET ORAL at 04:55

## 2021-02-07 RX ADMIN — LORAZEPAM 0.5 MG: 2 INJECTION INTRAMUSCULAR; INTRAVENOUS at 06:34

## 2021-02-07 RX ADMIN — OMEPRAZOLE 20 MG: 20 CAPSULE, DELAYED RELEASE ORAL at 18:25

## 2021-02-07 RX ADMIN — AMLODIPINE BESYLATE 10 MG: 5 TABLET ORAL at 18:26

## 2021-02-07 RX ADMIN — SUCRALFATE 1 G: 1 SUSPENSION ORAL at 18:25

## 2021-02-07 RX ADMIN — HEPARIN SODIUM 5000 UNITS: 5000 INJECTION, SOLUTION INTRAVENOUS; SUBCUTANEOUS at 04:55

## 2021-02-07 RX ADMIN — CEFTRIAXONE SODIUM 1 G: 1 INJECTION, POWDER, FOR SOLUTION INTRAMUSCULAR; INTRAVENOUS at 04:54

## 2021-02-07 RX ADMIN — OMEPRAZOLE 20 MG: 20 CAPSULE, DELAYED RELEASE ORAL at 04:55

## 2021-02-07 RX ADMIN — HYDRALAZINE HYDROCHLORIDE 20 MG: 20 INJECTION INTRAMUSCULAR; INTRAVENOUS at 19:51

## 2021-02-07 RX ADMIN — HEPARIN SODIUM 5000 UNITS: 5000 INJECTION, SOLUTION INTRAVENOUS; SUBCUTANEOUS at 15:29

## 2021-02-07 RX ADMIN — ONDANSETRON 4 MG: 2 INJECTION INTRAMUSCULAR; INTRAVENOUS at 06:01

## 2021-02-07 ASSESSMENT — FIBROSIS 4 INDEX: FIB4 SCORE: 2.72

## 2021-02-07 ASSESSMENT — ENCOUNTER SYMPTOMS
BRUISES/BLEEDS EASILY: 0
DEPRESSION: 0
HEMOPTYSIS: 0
BLURRED VISION: 0
BACK PAIN: 1
VOMITING: 1
NAUSEA: 1
CHILLS: 0
COUGH: 0
FEVER: 1
FOCAL WEAKNESS: 0
MYALGIAS: 0
PALPITATIONS: 0
DIARRHEA: 0

## 2021-02-07 ASSESSMENT — PAIN DESCRIPTION - PAIN TYPE
TYPE: ACUTE PAIN
TYPE: CHRONIC PAIN
TYPE: ACUTE PAIN
TYPE: ACUTE PAIN

## 2021-02-07 NOTE — PROGRESS NOTES
"Hospital Medicine Daily Progress Note    Date of Service  2/7/2021    Chief Complaint  25 y.o. female admitted 2/5/2021 with   Chief Complaint   Patient presents with   • Low Back Pain   • Fever   • Flank Pain         Hospital Course    25 y.o. female with past medical history of lupus complicated by lupus nephritis/end-stage renal disease on hemodialysis Monday Wednesday Friday, history of peptic ulcer disease, GI bleed, severe chronic anemia, hypertension and seizures presents emergency department on 2/5/2021 with a 1 day history of lower back pain and fatigue.  Patient reported that she went to dialysis and after dialysis she started having lower back pain.  She describes it as a sharp pain in her lower back and and reports tenderness to palpation prior to receiving opiates at ED.  She associates it with dyspnea on exertion.  She has noticed increase \"puffiness\" in both hands with some morning stiffness as well as bilateral knee pains.  She saw her rheumatologist last month which she is on mycophenolate, Plaquenil and prednisone which she reports compliance. Patient denies any orthostatic changes or feeling of dizziness.       At the emergency department, vital signs with a 101 fever, hypertension SBP in the 160s and tachycardia heart rate in the 110s.  The LA trended to 1.8 in the absence of IV hydration.  Procalcitonin 1.65 and CRP 1.4 troponin 931.  Chest x-ray showing cardiomegaly and CT renal colic showing dilated duodenum and proximal small bowel may indicate early/partial obstruction, mildly increase colonic stool.  Blood culture neg. She received 2 doses of morphine, 1 dose of IV Zofran and was started on ceftriaxone antibiotic regimen.    Still n/v, KUB ordered  Blood culture neg  Lumbar CT No evidence of fracture of the lumbar spine. 2. Minimal disc bulges at L4-5 and L5-S1. No central canal or neural foraminal narrowing  Hb 6.5 on admission, s/p 1unit PRBC transfusion 2/6  FOBT pending  Urine culture " pending    Interval Problem Update  Patient was seen and examined at the bedside. RN reports patient still feels n/v.  VS reviewed.   KUB ordered    Consultants/Specialty  nephrology    Code Status  Full Code    Disposition  TBD    Review of Systems  Review of Systems   Constitutional: Positive for fever. Negative for chills.   HENT: Negative for ear discharge.    Eyes: Negative for blurred vision.   Respiratory: Negative for cough and hemoptysis.    Cardiovascular: Negative for chest pain and palpitations.   Gastrointestinal: Positive for nausea and vomiting. Negative for diarrhea.   Genitourinary: Negative for dysuria.   Musculoskeletal: Positive for back pain. Negative for myalgias.   Skin: Negative for rash.   Neurological: Negative for focal weakness.   Endo/Heme/Allergies: Does not bruise/bleed easily.   Psychiatric/Behavioral: Negative for depression.        Physical Exam  Temp:  [36.3 °C (97.3 °F)-36.6 °C (97.8 °F)] 36.6 °C (97.8 °F)  Pulse:  [70-88] 78  Resp:  [16-18] 16  BP: (114-160)/() 140/103  SpO2:  [94 %-100 %] 97 %    Physical Exam  Vitals signs and nursing note reviewed.   Constitutional:       General: She is not in acute distress.     Appearance: Normal appearance.   HENT:      Head: Normocephalic and atraumatic.      Nose: Nose normal.      Mouth/Throat:      Mouth: Mucous membranes are dry.      Pharynx: Oropharynx is clear.   Eyes:      Extraocular Movements: Extraocular movements intact.      Conjunctiva/sclera: Conjunctivae normal.      Pupils: Pupils are equal, round, and reactive to light.   Neck:      Musculoskeletal: Normal range of motion and neck supple.   Cardiovascular:      Rate and Rhythm: Normal rate and regular rhythm.      Pulses: Normal pulses.      Heart sounds: Normal heart sounds.   Pulmonary:      Effort: Pulmonary effort is normal. No respiratory distress.      Breath sounds: Normal breath sounds. No wheezing or rales.   Abdominal:      General: Abdomen is flat. Bowel  sounds are normal.      Palpations: Abdomen is soft.   Musculoskeletal: Normal range of motion.         General: Tenderness (lower lumbar area) present. No swelling.      Right lower leg: No edema.      Left lower leg: No edema.   Skin:     General: Skin is warm and dry.   Neurological:      General: No focal deficit present.      Mental Status: She is alert and oriented to person, place, and time. Mental status is at baseline.   Psychiatric:         Mood and Affect: Mood normal.         Behavior: Behavior normal.         Fluids    Intake/Output Summary (Last 24 hours) at 2/7/2021 1208  Last data filed at 2/6/2021 1600  Gross per 24 hour   Intake 450 ml   Output --   Net 450 ml       Laboratory  Recent Labs     02/06/21  0313 02/06/21  1226 02/07/21  0330   WBC 2.9* 2.4* 4.7*   RBC 2.56* 2.65* 3.02*   HEMOGLOBIN 6.5* 6.8* 8.2*   HEMATOCRIT 23.4* 23.9* 27.0*   MCV 91.4 90.2 89.4   MCH 25.4* 25.7* 27.2   MCHC 27.8* 28.5* 30.4*   RDW 54.1* 53.9* 50.9*   PLATELETCT 54* 72* 78*   MPV 11.1 13.3* 12.8     Recent Labs     02/05/21  1408 02/06/21  0313 02/07/21  0330   SODIUM 132* 129* 130*   POTASSIUM 3.9 4.6 4.1   CHLORIDE 89* 86* 88*   CO2 29 32 30   GLUCOSE 87 108* 93   BUN 9 16 37*   CREATININE 3.05* 4.63* 6.26*   CALCIUM 8.8 8.7 8.3*     Recent Labs     02/05/21  1408   APTT 28.5   INR 1.10               Imaging  CT-LSPINE W/O PLUS RECONS   Final Result      1.  No evidence of fracture of the lumbar spine.      2.  Minimal disc bulges at L4-5 and L5-S1. No central canal or neural foraminal narrowing.      CT-RENAL COLIC EVALUATION(A/P W/O)   Final Result      1.  Atrophic kidneys with probable cysts, similar to prior exam.  Mass is not excluded on noncontrast study.   2.  Dilated duodenum and proximal small bowel may indicate early or partial obstruction.   3.  Mildly increased colonic stool.   4.  Normal appendix.   5.  LEFT ovarian cyst versus dominant follicle.      DX-CHEST-PORTABLE (1 VIEW)   Final Result       Cardiomegaly.           Assessment/Plan  * Sepsis ([facility])  Assessment & Plan  This is Sepsis Present on admission  SIRS criteria identified on my evaluation include: Fever, with temperature greater than 101 deg F, Tachycardia, with heart rate greater than 90 BPM and Tachypnea, with respirations greater than 20 per minute  Source is still unknown, possibly urine versus GI  Sepsis protocol initiated  Fluid resuscitation ordered per protocol  IV antibiotics as appropriate for source of sepsis  While organ dysfunction may be noted elsewhere in this problem list or in the chart, degree of organ dysfunction does not meet CMS criteria for severe sepsis    SIRS 3/4, qSOFA 0/3  Chief complaint back pain with tenderness to palpation  No pneumonia on chest x-ray  Renal CT without hydronephrosis or pyelonephritis, did show duodenal distention  Elevated CRP and procalcitonin in the setting of CKD  UA with bacteria, urine culture pending  Blood and urine samples collected at ED  Continue Rocephin antibiotic regimen for now  Follow blood cultures neg      Lupus ([facility])- (present on admission)  Assessment & Plan  Fever, and hand/back arthralgia concerning for mild-mod SLE flair   Continue home Mycophenolate, Plaquenil and Prednisone  Pending EDUARDO with reflex  C3/C4 low      Acute on chronic anemia- (present on admission)  Assessment & Plan  History of upper GI bleed secondary to gastritis noted on  Patient denies hematochezia, melena or hematochezia   Tachycardic, fatigue  Hemoglobin better today, likely 2/2 HD and underlying lupus  Repeat iron studies  FOBT  S/p transfusion 2/6  Monitor cbc    Elevated troponin- (present on admission)  Assessment & Plan  Troponin in the 900s, at baseline  Patient denies chest pain  EKG with sinus tachycardia  Cont monitoring    ESRD (end stage renal disease) on dialysis ([facility])- (present on admission)  Assessment & Plan  Secondary to lupus nephritis  Continue Monday Wednesday Friday HD  Nephrology  consulted to resume HD in house    Transaminitis  Assessment & Plan  Elevated ALT/AST/ALT with normal bilirubin, trending down  Acute hepatitis panel neg 11/2020  Will cont monitoring    Back pain  Assessment & Plan  Reported severe back pain on admission  Tenderness on the lower back on exam  Denies IVDU  ESR,75 CRP 1.4  Check L lumbar: no acute abnormalities    Hyponatremia- (present on admission)  Assessment & Plan  Asymptomatic  Hyponatremia sec to fluid overload in the setting of ESRD  Dialysis as scheduled  Cont monitoring    Gastroesophageal reflux disease without esophagitis- (present on admission)  Assessment & Plan  Continue home omeprazole    Hypertension- (present on admission)  Assessment & Plan  Continue home amlodipine and atenolol  As needed hydralazine       VTE prophylaxis: heparin

## 2021-02-07 NOTE — PROGRESS NOTES
Patient called RN into the room and patient was actively vomiting.  Patient had large emesis.  Medicated with Zofran IVP. MD notified that PO pain medications are making the patient nauseated and causing vomiting.  Will continue to monitor.

## 2021-02-07 NOTE — CARE PLAN
Problem: Communication  Goal: The ability to communicate needs accurately and effectively will improve  Outcome: PROGRESSING AS EXPECTED  Intervention: Crystal patient and significant other/support system to call light to alert staff of needs  Flowsheets (Taken 2/7/2021 0132)  Oriented to::   All of the Following : Location of Bathroom, Visiting Policy, Unit Routine, Call Light and Bedside Controls, Bedside Rail Policy, Smoking Policy, Rights and Responsibilities, Bedside Report, and Patient Education Notebook   Call Light & Bedside Controls   Patient Education Notebook   Location of bathroom   Bedside Rail Policy   Bedside Report   Visiting Policy   Smoking Policy   Unit Routine   Patient Rights and Responsibilities  Intervention: Reorient patient to environment as needed  Flowsheets (Taken 2/7/2021 0132)  Oriented to::   All of the Following : Location of Bathroom, Visiting Policy, Unit Routine, Call Light and Bedside Controls, Bedside Rail Policy, Smoking Policy, Rights and Responsibilities, Bedside Report, and Patient Education Notebook   Call Light & Bedside Controls   Patient Education Notebook   Location of bathroom   Bedside Rail Policy   Bedside Report   Visiting Policy   Smoking Policy   Unit Routine   Patient Rights and Responsibilities  Intervention: Educate patient and significant other/support system about the plan of care, procedures, treatments, medications and allow for questions  Flowsheets (Taken 2/7/2021 0132)  Pt & Family Have Been Educated on Methods Available to Report Concerns Related to Care, Treatment, Services, and Patient Safety Issues: Yes  Intervention: Use communication aids and/or /Language Line as appropriate  Flowsheets (Taken 2/7/2021 0132)  Does Pt Need Special Equipment for the Hearing Impaired?: No     Problem: Safety  Goal: Will remain free from injury  2/7/2021 0132 by Antwon Leone R.N.  Outcome: PROGRESSING AS EXPECTED  2/7/2021 0106 by Antwon Leone  FLOWER  Outcome: PROGRESSING AS EXPECTED  2/6/2021 2057 by Antwon Leone R.N.  Outcome: PROGRESSING AS EXPECTED  Goal: Will remain free from falls  Outcome: PROGRESSING AS EXPECTED  Intervention: Assess risk factors for falls  Flowsheets  Taken 2/7/2021 0132 by Antwon Leone R.N.  History of fall: 0  Mobility Status Assessment: 0-Ambulates & Transfers Independently. No Assistance Required  Risk for Injury-Any positive answers results in the pt being at high risk for fall related injury: Not Applicable  Taken 2/6/2021 0900 by Meryl Long R.N.  Pt Calls for Assistance: Yes  Intervention: Implement fall precautions  Flowsheets (Taken 2/7/2021 0132)  Environmental Precautions:   Treaded Slipper Socks on Patient   Personal Belongings, Wastebasket, Call Bell etc. in Easy Reach   Transferred to Stronger Side   Report Given to Other Health Care Providers Regarding Fall Risk   Bed in Low Position   Communication Sign for Patients & Families   Mobility Assessed & Appropriate Sign Placed  Bedrails: Bedrails Closest to Bathroom Down     Problem: Infection  Goal: Will remain free from infection  2/7/2021 0132 by Antwon Leone R.N.  Outcome: PROGRESSING AS EXPECTED  2/6/2021 2057 by Antwon Leone R.N.  Outcome: PROGRESSING AS EXPECTED     Problem: Venous Thromboembolism (VTW)/Deep Vein Thrombosis (DVT) Prevention:  Goal: Patient will participate in Venous Thrombosis (VTE)/Deep Vein Thrombosis (DVT)Prevention Measures  2/7/2021 0132 by Antwon Leone R.N.  Outcome: PROGRESSING AS EXPECTED  Flowsheets (Taken 2/7/2021 0132)  SCDs, Sequential Compression Device: Refused  Pharmacologic Prophylaxis Used: Unfractionated Heparin  2/6/2021 2057 by Antwon Leone R.N.  Outcome: PROGRESSING AS EXPECTED     Problem: Bowel/Gastric:  Goal: Normal bowel function is maintained or improved  2/7/2021 0132 by Antwon Leone R.N.  Outcome: PROGRESSING AS EXPECTED  Flowsheets (Taken 2/6/2021 2000)  Last BM: 02/06/21 2/6/2021 2057 by Antwon Leone  CHANCENJg  Outcome: PROGRESSING AS EXPECTED  Goal: Will not experience complications related to bowel motility  2/7/2021 0132 by Antwon Leone R.N.  Outcome: PROGRESSING AS EXPECTED  2/6/2021 2057 by Antwon Leone R.N.  Outcome: PROGRESSING AS EXPECTED     Problem: Knowledge Deficit  Goal: Knowledge of disease process/condition, treatment plan, diagnostic tests, and medications will improve  Outcome: PROGRESSING AS EXPECTED  Goal: Knowledge of the prescribed therapeutic regimen will improve  Outcome: PROGRESSING AS EXPECTED     Problem: Discharge Barriers/Planning  Goal: Patient's continuum of care needs will be met  Outcome: PROGRESSING AS EXPECTED     Problem: Pain Management  Goal: Pain level will decrease to patient's comfort goal  2/7/2021 0132 by Antwon Leone R.N.  Outcome: PROGRESSING AS EXPECTED  Flowsheets  Taken 2/6/2021 2153  Non Verbal Scale:   Calm   Sleeping   Unlabored Breathing  Taken 2/6/2021 2029  Comfort Goal:   Sleep Comfortably   Comfort with Movement  Pain Rating Scale (NPRS): 7  2/6/2021 2057 by Antwon Leone R.N.  Outcome: PROGRESSING AS EXPECTED     Problem: Respiratory:  Goal: Respiratory status will improve  Outcome: PROGRESSING AS EXPECTED  Intervention: Assess and monitor pulmonary status  Flowsheets (Taken 2/7/2021 0132)  Work Of Breathing / Effort: Within Normal Limits     Problem: Mobility  Goal: Risk for activity intolerance will decrease  Outcome: PROGRESSING AS EXPECTED

## 2021-02-07 NOTE — CONSULTS
DATE OF SERVICE:  02/07/2021     REQUESTING PHYSICIAN:  Luda Draper MD     REASON FOR CONSULTATION:  Management of end-stage renal disease, assessing the   need for dialysis.     The patient seen and examined, medical record reviewed.     HISTORY OF PRESENT ILLNESS:  The patient is an unfortunate 25-year-old lady   who is well known to our service.  She has a history of end-stage renal   disease secondary to lupus nephritis, undergoes hemodialysis on Monday,   Wednesday, Friday, presented to the hospital on 02/05/2021 with low back pain   and fever.  The patient has been hospitalized and been worked up for possible   small bowel obstruction, we were called to manage her kidney disease,   assessing the need for dialysis.     Her last dialysis was on Friday, 02/05.     The patient continued to have mild nausea and generalized weakness.     PAST MEDICAL HISTORY:  Significant for:  1.  End-stage renal disease.  2.  Lupus.     ALLERGIES:  The list was reviewed.     SOCIAL HISTORY:  The patient has no smoking history.     FAMILY HISTORY:  Positive for diabetes.     MEDICATIONS:  Her medications were reviewed.     REVIEW OF SYSTEMS:  Again, the patient has mild nausea, generalized fatigue.    All other review of systems is negative except as outlined in history of   present illness.     PHYSICAL EXAMINATION:    GENERAL:  The patient is in no apparent distress.  VITAL SIGNS:  Showed blood pressure of 140/100, heart rate was 78, respiratory   rate was 16.  HEENT:  Normocephalic, atraumatic.  Sclerae are anicteric.  Pupils are   reactive.  Nose normal.  Mucous membranes moist.  NECK:  No lymphadenopathy.  No JVD or thyromegaly.  CHEST:  Normal.  LUNGS:  Clear to auscultation.  HEART:  S1, S2.  ABDOMEN:  Soft, nontender.  EXTREMITIES:  There is no lower extremity edema.     LABORATORY DATA:  Her recent labs from today were reviewed.     ASSESSMENT AND PLAN:    1.  End-stage renal disease.  2.  Uncontrolled hypertension.  3.   Anemia.  4.  Questionable small bowel obstruction.     PLAN:    1.  There is no acute need for dialysis today.  2.  We will plan dialysis tomorrow.  3.  Optimize blood pressure management by changing her medication to IV until   nausea is better.  4. Renal diet.  5.  Renal dose all medications.  6.  Prognosis is guarded.     Plan discussed in detail with Dr. Draper.        ______________________________  FADI NAJJAR, MD FN/TARAN    DD:  02/07/2021 13:56  DT:  02/07/2021 15:51    Job#:  [id number]

## 2021-02-08 VITALS
DIASTOLIC BLOOD PRESSURE: 85 MMHG | WEIGHT: 138.23 LBS | OXYGEN SATURATION: 97 % | RESPIRATION RATE: 16 BRPM | BODY MASS INDEX: 22.22 KG/M2 | TEMPERATURE: 97.7 F | SYSTOLIC BLOOD PRESSURE: 129 MMHG | HEIGHT: 66 IN | HEART RATE: 89 BPM

## 2021-02-08 PROBLEM — A41.9 SEPSIS (HCC): Status: RESOLVED | Noted: 2021-02-05 | Resolved: 2021-02-08

## 2021-02-08 LAB
ALBUMIN SERPL BCP-MCNC: 2.9 G/DL (ref 3.2–4.9)
ALBUMIN/GLOB SERPL: 0.6 G/DL
ALP SERPL-CCNC: 120 U/L (ref 30–99)
ALT SERPL-CCNC: 54 U/L (ref 2–50)
ANION GAP SERPL CALC-SCNC: 14 MMOL/L (ref 7–16)
AST SERPL-CCNC: 43 U/L (ref 12–45)
BACTERIA UR CULT: NORMAL
BASOPHILS # BLD AUTO: 0.2 % (ref 0–1.8)
BASOPHILS # BLD: 0.01 K/UL (ref 0–0.12)
BILIRUB SERPL-MCNC: 0.4 MG/DL (ref 0.1–1.5)
BUN SERPL-MCNC: 51 MG/DL (ref 8–22)
CALCIUM SERPL-MCNC: 8.3 MG/DL (ref 8.4–10.2)
CHLORIDE SERPL-SCNC: 88 MMOL/L (ref 96–112)
CO2 SERPL-SCNC: 26 MMOL/L (ref 20–33)
CREAT SERPL-MCNC: 7.68 MG/DL (ref 0.5–1.4)
EOSINOPHIL # BLD AUTO: 0.04 K/UL (ref 0–0.51)
EOSINOPHIL NFR BLD: 0.7 % (ref 0–6.9)
ERYTHROCYTE [DISTWIDTH] IN BLOOD BY AUTOMATED COUNT: 54.4 FL (ref 35.9–50)
GLOBULIN SER CALC-MCNC: 4.6 G/DL (ref 1.9–3.5)
GLUCOSE SERPL-MCNC: 85 MG/DL (ref 65–99)
HCT VFR BLD AUTO: 27.9 % (ref 37–47)
HGB BLD-MCNC: 8.3 G/DL (ref 12–16)
IMM GRANULOCYTES # BLD AUTO: 0.02 K/UL (ref 0–0.11)
IMM GRANULOCYTES NFR BLD AUTO: 0.3 % (ref 0–0.9)
LYMPHOCYTES # BLD AUTO: 0.62 K/UL (ref 1–4.8)
LYMPHOCYTES NFR BLD: 10.5 % (ref 22–41)
MCH RBC QN AUTO: 26.7 PG (ref 27–33)
MCHC RBC AUTO-ENTMCNC: 29.7 G/DL (ref 33.6–35)
MCV RBC AUTO: 89.7 FL (ref 81.4–97.8)
MONOCYTES # BLD AUTO: 0.33 K/UL (ref 0–0.85)
MONOCYTES NFR BLD AUTO: 5.6 % (ref 0–13.4)
NEUTROPHILS # BLD AUTO: 4.91 K/UL (ref 2–7.15)
NEUTROPHILS NFR BLD: 82.7 % (ref 44–72)
NRBC # BLD AUTO: 0 K/UL
NRBC BLD-RTO: 0 /100 WBC
PLATELET # BLD AUTO: 74 K/UL (ref 164–446)
PMV BLD AUTO: 12.2 FL (ref 9–12.9)
POTASSIUM SERPL-SCNC: 4.1 MMOL/L (ref 3.6–5.5)
PROT SERPL-MCNC: 7.5 G/DL (ref 6–8.2)
RBC # BLD AUTO: 3.11 M/UL (ref 4.2–5.4)
SIGNIFICANT IND 70042: NORMAL
SITE SITE: NORMAL
SODIUM SERPL-SCNC: 128 MMOL/L (ref 135–145)
SOURCE SOURCE: NORMAL
WBC # BLD AUTO: 5.9 K/UL (ref 4.8–10.8)

## 2021-02-08 PROCEDURE — 5A1D70Z PERFORMANCE OF URINARY FILTRATION, INTERMITTENT, LESS THAN 6 HOURS PER DAY: ICD-10-PCS | Performed by: INTERNAL MEDICINE

## 2021-02-08 PROCEDURE — 700102 HCHG RX REV CODE 250 W/ 637 OVERRIDE(OP): Performed by: STUDENT IN AN ORGANIZED HEALTH CARE EDUCATION/TRAINING PROGRAM

## 2021-02-08 PROCEDURE — 99239 HOSP IP/OBS DSCHRG MGMT >30: CPT | Performed by: STUDENT IN AN ORGANIZED HEALTH CARE EDUCATION/TRAINING PROGRAM

## 2021-02-08 PROCEDURE — 90935 HEMODIALYSIS ONE EVALUATION: CPT | Performed by: INTERNAL MEDICINE

## 2021-02-08 PROCEDURE — 700111 HCHG RX REV CODE 636 W/ 250 OVERRIDE (IP): Performed by: INTERNAL MEDICINE

## 2021-02-08 PROCEDURE — 700111 HCHG RX REV CODE 636 W/ 250 OVERRIDE (IP): Performed by: STUDENT IN AN ORGANIZED HEALTH CARE EDUCATION/TRAINING PROGRAM

## 2021-02-08 PROCEDURE — 85025 COMPLETE CBC W/AUTO DIFF WBC: CPT

## 2021-02-08 PROCEDURE — 80053 COMPREHEN METABOLIC PANEL: CPT

## 2021-02-08 PROCEDURE — 700105 HCHG RX REV CODE 258: Performed by: STUDENT IN AN ORGANIZED HEALTH CARE EDUCATION/TRAINING PROGRAM

## 2021-02-08 PROCEDURE — A9270 NON-COVERED ITEM OR SERVICE: HCPCS | Performed by: STUDENT IN AN ORGANIZED HEALTH CARE EDUCATION/TRAINING PROGRAM

## 2021-02-08 PROCEDURE — 90935 HEMODIALYSIS ONE EVALUATION: CPT

## 2021-02-08 RX ORDER — HEPARIN SODIUM 1000 [USP'U]/ML
1500 INJECTION, SOLUTION INTRAVENOUS; SUBCUTANEOUS
Status: DISCONTINUED | OUTPATIENT
Start: 2021-02-08 | End: 2021-02-08 | Stop reason: HOSPADM

## 2021-02-08 RX ORDER — CEFDINIR 300 MG/1
300 CAPSULE ORAL 2 TIMES DAILY
Qty: 4 CAP | Refills: 0 | Status: SHIPPED | OUTPATIENT
Start: 2021-02-08 | End: 2021-02-10

## 2021-02-08 RX ADMIN — SUCRALFATE 1 G: 1 SUSPENSION ORAL at 05:41

## 2021-02-08 RX ADMIN — OMEPRAZOLE 20 MG: 20 CAPSULE, DELAYED RELEASE ORAL at 05:41

## 2021-02-08 RX ADMIN — HEPARIN SODIUM 5000 UNITS: 5000 INJECTION, SOLUTION INTRAVENOUS; SUBCUTANEOUS at 05:41

## 2021-02-08 RX ADMIN — LORAZEPAM 0.5 MG: 2 INJECTION INTRAMUSCULAR; INTRAVENOUS at 12:20

## 2021-02-08 RX ADMIN — LORAZEPAM 0.5 MG: 2 INJECTION INTRAMUSCULAR; INTRAVENOUS at 05:07

## 2021-02-08 RX ADMIN — HEPARIN SODIUM 1500 UNITS: 1000 INJECTION, SOLUTION INTRAVENOUS; SUBCUTANEOUS at 12:30

## 2021-02-08 RX ADMIN — EPOETIN ALFA-EPBX 3000 UNITS: 3000 INJECTION, SOLUTION INTRAVENOUS; SUBCUTANEOUS at 13:00

## 2021-02-08 RX ADMIN — CEFTRIAXONE SODIUM 1 G: 1 INJECTION, POWDER, FOR SOLUTION INTRAMUSCULAR; INTRAVENOUS at 05:41

## 2021-02-08 RX ADMIN — FUROSEMIDE 40 MG: 40 TABLET ORAL at 05:41

## 2021-02-08 ASSESSMENT — PAIN DESCRIPTION - PAIN TYPE
TYPE: CHRONIC PAIN

## 2021-02-08 NOTE — PROGRESS NOTES
Received report from MICHELLE Márquez. Safety precautions in place. Bed locked and low. Offered assist. Call light and belongings within reach.

## 2021-02-08 NOTE — PROGRESS NOTES
Telemetry Shift Summary     Rhythm SR  HR Range 74-90  Ectopy n/a  Measurements 0.20/0.10/0.40           Normal Values  Rhythm SR  HR Range    Measurements 0.12-0.20 / 0.06-0.10  / 0.30-0.52

## 2021-02-08 NOTE — PROCEDURES
Nephrology/Hemodialysis note    Patient with ESRD sec to lupus nephritis, on HD admitted with sepsis.    Seen and examined during dialysis  Tolerates well  VS stable  Lab results reviewed  Please see dialysis flow sheet for details

## 2021-02-08 NOTE — DISCHARGE PLANNING
Outpatient Dialysis Note    Confirmed patient is active at:    [address] [address]    Schedule: Monday, Wednesday, Friday   Time: 06:00am    Patient is seen by Dr. Trent in HD clinic.    Spoke with Tamra at facility who confirmed.    Forwarded records for review.    Mary Han- Dialysis Coordinator  [facility] # [phone]

## 2021-02-08 NOTE — DISCHARGE SUMMARY
"Discharge Summary    CHIEF COMPLAINT ON ADMISSION  Chief Complaint   Patient presents with   • Low Back Pain   • Fever   • Flank Pain       Reason for Admission  Back Pain     Admission Date  2/5/2021    CODE STATUS  Full Code    HPI & HOSPITAL COURSE  25 y.o. female with past medical history of lupus complicated by lupus nephritis/end-stage renal disease on hemodialysis Monday Wednesday Friday, history of peptic ulcer disease, GI bleed, severe chronic anemia, hypertension and seizures presents emergency department on 2/5/2021 with a 1 day history of lower back pain and fatigue.  Patient reported that she went to dialysis and after dialysis she started having lower back pain.  She describes it as a sharp pain in her lower back and and reports tenderness to palpation prior to receiving opiates at ED.  She associates it with dyspnea on exertion.  She has noticed increase \"puffiness\" in both hands with some morning stiffness as well as bilateral knee pains.  She saw her rheumatologist last month which she is on mycophenolate, Plaquenil and prednisone which she reports compliance.       At the emergency department, vital signs with a 101 fever, hypertension SBP in the 160s and tachycardia heart rate in the 110s.  The LA trended to 1.8 in the absence of IV hydration.  Procalcitonin 1.65 and CRP 1.4 troponin 931.  Chest x-ray showing cardiomegaly and CT renal colic showing dilated duodenum and proximal small bowel may indicate early/partial obstruction, mildly increase colonic stool.  Blood culture neg. She received 2 doses of morphine, 1 dose of IV Zofran and was started on ceftriaxone antibiotic regimen.      Still n/v, KUB ordered, normal  Blood culture neg and urine culture neg  Lumbar CT No evidence of fracture of the lumbar spine. 2. Minimal disc bulges at L4-5 and L5-S1. No central canal or neural foraminal narrowing  Hb 6.5 on admission, s/p PRBC transfusion 2/6    Patient remains afebrile after admission, no " "source of infections identified.  I discussed with ID Dr. Mary, who recommended to complete 5-day course of IV empiric antibiotics.  On the day of discharge, patient reports low back pain has been improving, no fever, chills, dysuria or diarrhea.    Therefore, she is discharged in fair and stable condition to home with close outpatient follow-up.  She is prescribed with po cefdinir 300mg bid for 2 more days. She is advised to follow up with PCP in one week    The patient met 2-midnight criteria for an inpatient stay at the time of discharge.    Discharge Date  2/8/2021    FOLLOW UP ITEMS POST DISCHARGE  PCP    DISCHARGE DIAGNOSES  Principal Problem (Resolved):    Sepsis ([facility]) POA: Unknown  Active Problems:    Lupus ([facility]) POA: Yes    ESRD (end stage renal disease) on dialysis ([facility]) POA: Yes    Elevated troponin POA: Yes    Acute on chronic anemia POA: Yes    Hyponatremia POA: Yes    Back pain POA: Unknown    Transaminitis POA: Unknown    Hypertension POA: Yes      Overview: \"Controlled with medication\"    Gastroesophageal reflux disease without esophagitis POA: Yes      FOLLOW UP  Future Appointments   Date Time Provider Department Center   2/18/2021 11:00 AM INFUSION QUICK INJECT ON [facility] [place]   2/20/20[facility]     Ella Matthew M.D.  [address] [place] [id number]  [phone]    In 1 week        MEDICATIONS ON DISCHARGE     Medication List      START taking these medications      Instructions   cefdinir 300 MG Caps  Commonly known as: OMNICEF   Take 1 Cap by mouth 2 times a day for 2 days.  Dose: 300 mg        CHANGE how you take these medications      Instructions   sucralfate 1 GM/10ML Susp  What changed: when to take this  Commonly known as: CARAFATE   Doctor's comments: Can give tablets at same dosage if pt unable to afford liquid preparation, quantity sufficient for one month  Take 10 mL by mouth 4 Times a Day,Before Meals and at Bedtime.  Dose: 1 g      "   CONTINUE taking these medications      Instructions   acetaminophen 500 MG Tabs  Commonly known as: TYLENOL   Take 500 mg by mouth every 6 hours as needed for Moderate Pain.  Dose: 500 mg     amLODIPine 10 MG Tabs  Commonly known as: NORVASC   Take 10 mg by mouth every evening.  Dose: 10 mg     atenolol 25 MG Tabs  Commonly known as: TENORMIN   Take 25 mg by mouth every evening.  Dose: 25 mg     hydroxychloroquine 200 MG Tabs  Commonly known as: Plaquenil   Take 200 mg by mouth every evening.  Dose: 200 mg     Lasix 40 MG Tabs  Generic drug: furosemide   Take 40 mg by mouth 2 (two) times a day.  Dose: 40 mg     mycophenolate 180 MG EC tablet  Commonly known as: Myfortic   Take 1 Tab by mouth every evening.  Dose: 180 mg     ondansetron 4 MG Tbdp  Commonly known as: ZOFRAN ODT   Take 1 Tab by mouth every four hours as needed for Nausea (give PO if no IV route available).  Dose: 4 mg     pantoprazole 40 MG Tbec  Commonly known as: PROTONIX   Take 1 Tab by mouth 2 times a day.  Dose: 40 mg     predniSONE 5 MG Tabs  Commonly known as: DELTASONE   Take 5 mg by mouth every evening.  Dose: 5 mg            Allergies  Allergies   Allergen Reactions   • Cephalexin Rash     .   • Clindamycin Rash     .   • Methylprednisolone Unspecified     Anxious   • Metoprolol Rash     .       DIET  Orders Placed This Encounter   Procedures   • Diet Order Diet: Renal     Standing Status:   Standing     Number of Occurrences:   1     Order Specific Question:   Diet:     Answer:   Renal [8]       ACTIVITY  As tolerated.  Weight bearing as tolerated    CONSULTATIONS  Nephrology  ID    PROCEDURES  hemodialysis    LABORATORY  Lab Results   Component Value Date    SODIUM 128 (L) 02/08/2021    POTASSIUM 4.1 02/08/2021    CHLORIDE 88 (L) 02/08/2021    CO2 26 02/08/2021    GLUCOSE 85 02/08/2021    BUN 51 (H) 02/08/2021    CREATININE 7.68 (HH) 02/08/2021        Lab Results   Component Value Date    WBC 5.9 02/08/2021    HEMOGLOBIN 8.3 (L)  02/08/2021    HEMATOCRIT 27.9 (L) 02/08/2021    PLATELETCT 74 (L) 02/08/2021        Total time of the discharge process exceeds 40 minutes.

## 2021-02-09 LAB — NUCLEAR IGG SER QL IA: DETECTED

## 2021-02-09 NOTE — PROGRESS NOTES
[facility] Progress Note     HD treatment ordered by Dr Najjar/Twan x 3 hours.  Treatment Start time: 1233          End time: 1533       Net UF 3000 ml    Patient tolerated treatment well. VS stable all through out. All blood was returned. RUE AVF needle removed. Dry gauze dressing applied and changed without bleeding issue. + Bruit/Thrill pre-post Treatment. See paper flow sheet for details.     Report given to MICHELLE Tejada.

## 2021-02-09 NOTE — DISCHARGE INSTRUCTIONS
Discharge Instructions per Luda Draper M.D.    1. Please follow-up with PCP as outpatient in one week  2. Please taking cefdinir for 2 more days  3. Cont your regular dialysis schedule    DIET: renal diet    ACTIVITY: As tolerated    DIAGNOSIS: sepsis, unknown source, HD on MWF, lupus, acute on chronic anemia s/p transfusion    Return to ER in the event of new or worsening symptoms. Please note importance of compliance and the patient has agreed to proceed with all medical recommendations and follow up plan indicated above. All medications come with benefits and risks. Risks may include permanent injury or death and these risks can be minimized with close reassessment and monitoring. Please make it to your scheduled follow ups with pcp    Discharge Instructions    Discharged to home by car with relative. Discharged via wheelchair, hospital escort: Yes.  Special equipment needed: Not Applicable    Be sure to schedule a follow-up appointment with your primary care doctor or any specialists as instructed.     Discharge Plan:   Diet Plan: Discussed  Activity Level: Discussed  Confirmed Follow up Appointment: Patient to Call and Schedule Appointment  Confirmed Symptoms Management: Discussed  Medication Reconciliation Updated: Yes  Influenza Vaccine Indication: Indicated: 9 to 64 years of age    I understand that a diet low in cholesterol, fat, and sodium is recommended for good health. Unless I have been given specific instructions below for another diet, I accept this instruction as my diet prescription.   Other diet: Renal Healthy    Special Instructions: None    · Is patient discharged on Warfarin / Coumadin?   No     Depression / Suicide Risk    As [facility], it is important to learn how to keep safe from harming yourself.    Recognize the warning signs:  · Abrupt changes in personality, positive or negative- including increase in energy   · Giving away  possessions  · Change in eating patterns- significant weight changes-  positive or negative  · Change in sleeping patterns- unable to sleep or sleeping all the time   · Unwillingness or inability to communicate  · Depression  · Unusual sadness, discouragement and loneliness  · Talk of wanting to die  · Neglect of personal appearance   · Rebelliousness- reckless behavior  · Withdrawal from people/activities they love  · Confusion- inability to concentrate     If you or a loved one observes any of these behaviors or has concerns about self-harm, here's what you can do:  · Talk about it- your feelings and reasons for harming yourself  · Remove any means that you might use to hurt yourself (examples: pills, rope, extension cords, firearm)  · Get professional help from the community (Mental Health, Substance Abuse, psychological counseling)  · Do not be alone:Call your Safe Contact- someone whom you trust who will be there for you.  · Call your local CRISIS HOTLINE 976-0179 or 513-283-7968  · Call your local Children's Mobile Crisis Response Team [place] (520) 778-5462 or www.Qnary  · Call the toll free National Suicide Prevention Hotlines   · National Suicide Prevention Lifeline 876-901-TTWM (2779)  · National Hope Line Network 800-SUICIDE (892-2903)

## 2021-02-11 LAB
ANA INTERPRETIVE COMMENT Q5143: ABNORMAL
ANA PATTERN Q5144: ABNORMAL
ANA TITER Q5145: ABNORMAL
ANTINUCLEAR ANTIBODY (ANA), HEP-2, IGG Q5142: DETECTED
DSDNA AB TITR SER CLIF: NORMAL {TITER}

## 2021-02-12 LAB
ENA JO1 AB TITR SER: 0 AU/ML (ref 0–40)
ENA SCL70 IGG SER QL: 1 AU/ML (ref 0–40)
ENA SM IGG SER-ACNC: 311 AU/ML (ref 0–40)
ENA SS-B IGG SER IA-ACNC: 1 AU/ML (ref 0–40)
SSA52 R0ENA AB IGG Q0420: 57 AU/ML (ref 0–40)
SSA60 R0ENA AB IGG Q0419: 113 AU/ML (ref 0–40)

## 2021-02-15 ENCOUNTER — HOSPITAL ENCOUNTER (INPATIENT)
Facility: MEDICAL CENTER | Age: 26
LOS: 3 days | DRG: 864 | End: 2021-02-18
Attending: EMERGENCY MEDICINE | Admitting: HOSPITALIST
Payer: COMMERCIAL

## 2021-02-15 ENCOUNTER — APPOINTMENT (OUTPATIENT)
Dept: CARDIOLOGY | Facility: MEDICAL CENTER | Age: 26
DRG: 864 | End: 2021-02-15
Attending: HOSPITALIST
Payer: COMMERCIAL

## 2021-02-15 ENCOUNTER — APPOINTMENT (OUTPATIENT)
Dept: RADIOLOGY | Facility: MEDICAL CENTER | Age: 26
DRG: 864 | End: 2021-02-15
Attending: HOSPITALIST
Payer: COMMERCIAL

## 2021-02-15 ENCOUNTER — APPOINTMENT (OUTPATIENT)
Dept: RADIOLOGY | Facility: MEDICAL CENTER | Age: 26
DRG: 864 | End: 2021-02-15
Attending: EMERGENCY MEDICINE
Payer: COMMERCIAL

## 2021-02-15 DIAGNOSIS — R11.0 NAUSEA: ICD-10-CM

## 2021-02-15 DIAGNOSIS — R50.81 FEVER IN OTHER DISEASES: ICD-10-CM

## 2021-02-15 DIAGNOSIS — M54.50 CHRONIC MIDLINE LOW BACK PAIN, UNSPECIFIED WHETHER SCIATICA PRESENT: ICD-10-CM

## 2021-02-15 DIAGNOSIS — I10 UNCONTROLLED HYPERTENSION: ICD-10-CM

## 2021-02-15 DIAGNOSIS — M32.9 PERSONAL HISTORY OF SYSTEMIC LUPUS ERYTHEMATOSUS (SLE) (HCC): ICD-10-CM

## 2021-02-15 DIAGNOSIS — M87.00 AVASCULAR NECROSIS (HCC): ICD-10-CM

## 2021-02-15 DIAGNOSIS — N18.6 CHRONIC KIDNEY DISEASE ON CHRONIC DIALYSIS (HCC): ICD-10-CM

## 2021-02-15 DIAGNOSIS — Z99.2 CHRONIC KIDNEY DISEASE ON CHRONIC DIALYSIS (HCC): ICD-10-CM

## 2021-02-15 DIAGNOSIS — G89.29 CHRONIC MIDLINE LOW BACK PAIN, UNSPECIFIED WHETHER SCIATICA PRESENT: ICD-10-CM

## 2021-02-15 DIAGNOSIS — A41.9 SEPSIS, DUE TO UNSPECIFIED ORGANISM, UNSPECIFIED WHETHER ACUTE ORGAN DYSFUNCTION PRESENT (HCC): ICD-10-CM

## 2021-02-15 PROBLEM — R65.10 SIRS (SYSTEMIC INFLAMMATORY RESPONSE SYNDROME) (HCC): Status: ACTIVE | Noted: 2021-02-15

## 2021-02-15 LAB
ALBUMIN SERPL BCP-MCNC: 3.3 G/DL (ref 3.2–4.9)
ALBUMIN/GLOB SERPL: 0.7 G/DL
ALP SERPL-CCNC: 83 U/L (ref 30–99)
ALT SERPL-CCNC: 14 U/L (ref 2–50)
ANION GAP SERPL CALC-SCNC: 17 MMOL/L (ref 7–16)
APPEARANCE UR: CLEAR
AST SERPL-CCNC: 23 U/L (ref 12–45)
BACTERIA #/AREA URNS HPF: NEGATIVE /HPF
BASOPHILS # BLD AUTO: 0.2 % (ref 0–1.8)
BASOPHILS # BLD: 0.02 K/UL (ref 0–0.12)
BILIRUB SERPL-MCNC: 0.5 MG/DL (ref 0.1–1.5)
BILIRUB UR QL STRIP.AUTO: NEGATIVE
BUN SERPL-MCNC: 46 MG/DL (ref 8–22)
CALCIUM SERPL-MCNC: 8.7 MG/DL (ref 8.4–10.2)
CHLORIDE SERPL-SCNC: 87 MMOL/L (ref 96–112)
CO2 SERPL-SCNC: 23 MMOL/L (ref 20–33)
COLOR UR: YELLOW
CORTIS SERPL-MCNC: 14.8 UG/DL (ref 0–23)
CREAT SERPL-MCNC: 8.22 MG/DL (ref 0.5–1.4)
EOSINOPHIL # BLD AUTO: 0 K/UL (ref 0–0.51)
EOSINOPHIL NFR BLD: 0 % (ref 0–6.9)
EPI CELLS #/AREA URNS HPF: NORMAL /HPF
ERYTHROCYTE [DISTWIDTH] IN BLOOD BY AUTOMATED COUNT: 59.2 FL (ref 35.9–50)
FLUAV RNA SPEC QL NAA+PROBE: NEGATIVE
FLUBV RNA SPEC QL NAA+PROBE: NEGATIVE
GLOBULIN SER CALC-MCNC: 4.7 G/DL (ref 1.9–3.5)
GLUCOSE SERPL-MCNC: 94 MG/DL (ref 65–99)
GLUCOSE UR STRIP.AUTO-MCNC: NEGATIVE MG/DL
HCG SERPL QL: NEGATIVE
HCT VFR BLD AUTO: 24.4 % (ref 37–47)
HGB BLD-MCNC: 7.3 G/DL (ref 12–16)
HYALINE CASTS #/AREA URNS LPF: NORMAL /LPF
IMM GRANULOCYTES # BLD AUTO: 0.03 K/UL (ref 0–0.11)
IMM GRANULOCYTES NFR BLD AUTO: 0.4 % (ref 0–0.9)
INR PPP: 1.11 (ref 0.87–1.13)
KETONES UR STRIP.AUTO-MCNC: NEGATIVE MG/DL
LACTATE BLD-SCNC: 0.9 MMOL/L (ref 0.5–2)
LACTATE BLD-SCNC: 1.2 MMOL/L (ref 0.5–2)
LEUKOCYTE ESTERASE UR QL STRIP.AUTO: NEGATIVE
LV EJECT FRACT  99904: 55
LV EJECT FRACT MOD 2C 99903: 50.66
LV EJECT FRACT MOD 4C 99902: 55.39
LV EJECT FRACT MOD BP 99901: 54.05
LYMPHOCYTES # BLD AUTO: 0.78 K/UL (ref 1–4.8)
LYMPHOCYTES NFR BLD: 9.5 % (ref 22–41)
MCH RBC QN AUTO: 26.8 PG (ref 27–33)
MCHC RBC AUTO-ENTMCNC: 29.9 G/DL (ref 33.6–35)
MCV RBC AUTO: 89.7 FL (ref 81.4–97.8)
MICRO URNS: ABNORMAL
MONOCYTES # BLD AUTO: 0.45 K/UL (ref 0–0.85)
MONOCYTES NFR BLD AUTO: 5.5 % (ref 0–13.4)
NEUTROPHILS # BLD AUTO: 6.9 K/UL (ref 2–7.15)
NEUTROPHILS NFR BLD: 84.4 % (ref 44–72)
NITRITE UR QL STRIP.AUTO: NEGATIVE
NRBC # BLD AUTO: 0 K/UL
NRBC BLD-RTO: 0 /100 WBC
PH UR STRIP.AUTO: 8.5 [PH] (ref 5–8)
PLATELET # BLD AUTO: 92 K/UL (ref 164–446)
PMV BLD AUTO: 11.3 FL (ref 9–12.9)
POTASSIUM SERPL-SCNC: 4.5 MMOL/L (ref 3.6–5.5)
PROCALCITONIN SERPL-MCNC: 1.46 NG/ML
PROT SERPL-MCNC: 8 G/DL (ref 6–8.2)
PROT UR QL STRIP: 30 MG/DL
PROTHROMBIN TIME: 14 SEC (ref 12–14.6)
RBC # BLD AUTO: 2.72 M/UL (ref 4.2–5.4)
RBC # URNS HPF: NORMAL /HPF
RBC UR QL AUTO: ABNORMAL
RSV RNA SPEC QL NAA+PROBE: NEGATIVE
SARS-COV-2 RNA RESP QL NAA+PROBE: NOTDETECTED
SODIUM SERPL-SCNC: 127 MMOL/L (ref 135–145)
SP GR UR STRIP.AUTO: 1.01
SPECIMEN SOURCE: NORMAL
WBC # BLD AUTO: 8.2 K/UL (ref 4.8–10.8)
WBC #/AREA URNS HPF: NORMAL /HPF

## 2021-02-15 PROCEDURE — 700111 HCHG RX REV CODE 636 W/ 250 OVERRIDE (IP): Performed by: EMERGENCY MEDICINE

## 2021-02-15 PROCEDURE — 80053 COMPREHEN METABOLIC PANEL: CPT

## 2021-02-15 PROCEDURE — 90935 HEMODIALYSIS ONE EVALUATION: CPT

## 2021-02-15 PROCEDURE — 700117 HCHG RX CONTRAST REV CODE 255: Performed by: HOSPITALIST

## 2021-02-15 PROCEDURE — 82533 TOTAL CORTISOL: CPT

## 2021-02-15 PROCEDURE — 81001 URINALYSIS AUTO W/SCOPE: CPT

## 2021-02-15 PROCEDURE — 700111 HCHG RX REV CODE 636 W/ 250 OVERRIDE (IP): Performed by: HOSPITALIST

## 2021-02-15 PROCEDURE — 87040 BLOOD CULTURE FOR BACTERIA: CPT

## 2021-02-15 PROCEDURE — 770006 HCHG ROOM/CARE - MED/SURG/GYN SEMI*

## 2021-02-15 PROCEDURE — 83605 ASSAY OF LACTIC ACID: CPT

## 2021-02-15 PROCEDURE — 700102 HCHG RX REV CODE 250 W/ 637 OVERRIDE(OP): Performed by: HOSPITALIST

## 2021-02-15 PROCEDURE — 84145 PROCALCITONIN (PCT): CPT

## 2021-02-15 PROCEDURE — 36415 COLL VENOUS BLD VENIPUNCTURE: CPT

## 2021-02-15 PROCEDURE — G0378 HOSPITAL OBSERVATION PER HR: HCPCS

## 2021-02-15 PROCEDURE — 96375 TX/PRO/DX INJ NEW DRUG ADDON: CPT

## 2021-02-15 PROCEDURE — 85025 COMPLETE CBC W/AUTO DIFF WBC: CPT

## 2021-02-15 PROCEDURE — A9270 NON-COVERED ITEM OR SERVICE: HCPCS | Performed by: HOSPITALIST

## 2021-02-15 PROCEDURE — 96365 THER/PROPH/DIAG IV INF INIT: CPT

## 2021-02-15 PROCEDURE — A9270 NON-COVERED ITEM OR SERVICE: HCPCS | Performed by: EMERGENCY MEDICINE

## 2021-02-15 PROCEDURE — 5A1D70Z PERFORMANCE OF URINARY FILTRATION, INTERMITTENT, LESS THAN 6 HOURS PER DAY: ICD-10-PCS | Performed by: INTERNAL MEDICINE

## 2021-02-15 PROCEDURE — 700102 HCHG RX REV CODE 250 W/ 637 OVERRIDE(OP): Performed by: EMERGENCY MEDICINE

## 2021-02-15 PROCEDURE — A9576 INJ PROHANCE MULTIPACK: HCPCS | Performed by: HOSPITALIST

## 2021-02-15 PROCEDURE — 93306 TTE W/DOPPLER COMPLETE: CPT | Mod: 26 | Performed by: INTERNAL MEDICINE

## 2021-02-15 PROCEDURE — 84703 CHORIONIC GONADOTROPIN ASSAY: CPT

## 2021-02-15 PROCEDURE — 93306 TTE W/DOPPLER COMPLETE: CPT

## 2021-02-15 PROCEDURE — 99285 EMERGENCY DEPT VISIT HI MDM: CPT

## 2021-02-15 PROCEDURE — C9803 HOPD COVID-19 SPEC COLLECT: HCPCS | Performed by: EMERGENCY MEDICINE

## 2021-02-15 PROCEDURE — 85610 PROTHROMBIN TIME: CPT

## 2021-02-15 PROCEDURE — 700105 HCHG RX REV CODE 258: Performed by: EMERGENCY MEDICINE

## 2021-02-15 PROCEDURE — 71045 X-RAY EXAM CHEST 1 VIEW: CPT

## 2021-02-15 PROCEDURE — 0241U HCHG SARS-COV-2 COVID-19 NFCT DS RESP RNA 4 TRGT MIC: CPT

## 2021-02-15 PROCEDURE — 96376 TX/PRO/DX INJ SAME DRUG ADON: CPT

## 2021-02-15 PROCEDURE — 99220 PR INITIAL OBSERVATION CARE,LEVL III: CPT | Performed by: HOSPITALIST

## 2021-02-15 PROCEDURE — 72158 MRI LUMBAR SPINE W/O & W/DYE: CPT

## 2021-02-15 RX ORDER — ONDANSETRON 4 MG/1
4 TABLET, ORALLY DISINTEGRATING ORAL EVERY 4 HOURS PRN
Status: DISCONTINUED | OUTPATIENT
Start: 2021-02-15 | End: 2021-02-18 | Stop reason: HOSPADM

## 2021-02-15 RX ORDER — BISACODYL 10 MG
10 SUPPOSITORY, RECTAL RECTAL
Status: DISCONTINUED | OUTPATIENT
Start: 2021-02-15 | End: 2021-02-18 | Stop reason: HOSPADM

## 2021-02-15 RX ORDER — PROMETHAZINE HYDROCHLORIDE 25 MG/1
12.5-25 SUPPOSITORY RECTAL EVERY 4 HOURS PRN
Status: DISCONTINUED | OUTPATIENT
Start: 2021-02-15 | End: 2021-02-18 | Stop reason: HOSPADM

## 2021-02-15 RX ORDER — POLYETHYLENE GLYCOL 3350 17 G/17G
1 POWDER, FOR SOLUTION ORAL
Status: DISCONTINUED | OUTPATIENT
Start: 2021-02-15 | End: 2021-02-18 | Stop reason: HOSPADM

## 2021-02-15 RX ORDER — MYCOPHENOLIC ACID 180 MG/1
180 TABLET, DELAYED RELEASE ORAL EVERY EVENING
Status: DISCONTINUED | OUTPATIENT
Start: 2021-02-15 | End: 2021-02-15

## 2021-02-15 RX ORDER — PROMETHAZINE HYDROCHLORIDE 25 MG/1
12.5-25 TABLET ORAL EVERY 4 HOURS PRN
Status: DISCONTINUED | OUTPATIENT
Start: 2021-02-15 | End: 2021-02-18 | Stop reason: HOSPADM

## 2021-02-15 RX ORDER — SODIUM CHLORIDE, SODIUM LACTATE, POTASSIUM CHLORIDE, AND CALCIUM CHLORIDE .6; .31; .03; .02 G/100ML; G/100ML; G/100ML; G/100ML
500 INJECTION, SOLUTION INTRAVENOUS
Status: DISCONTINUED | OUTPATIENT
Start: 2021-02-15 | End: 2021-02-18 | Stop reason: HOSPADM

## 2021-02-15 RX ORDER — SODIUM CHLORIDE 9 MG/ML
INJECTION, SOLUTION INTRAVENOUS CONTINUOUS
Status: DISCONTINUED | OUTPATIENT
Start: 2021-02-15 | End: 2021-02-15

## 2021-02-15 RX ORDER — PROCHLORPERAZINE EDISYLATE 5 MG/ML
5-10 INJECTION INTRAMUSCULAR; INTRAVENOUS EVERY 4 HOURS PRN
Status: DISCONTINUED | OUTPATIENT
Start: 2021-02-15 | End: 2021-02-18 | Stop reason: HOSPADM

## 2021-02-15 RX ORDER — MORPHINE SULFATE 4 MG/ML
2-4 INJECTION, SOLUTION INTRAMUSCULAR; INTRAVENOUS
Status: DISCONTINUED | OUTPATIENT
Start: 2021-02-15 | End: 2021-02-17

## 2021-02-15 RX ORDER — HYDROXYCHLOROQUINE SULFATE 200 MG/1
200 TABLET, FILM COATED ORAL EVERY EVENING
Status: DISCONTINUED | OUTPATIENT
Start: 2021-02-15 | End: 2021-02-18 | Stop reason: HOSPADM

## 2021-02-15 RX ORDER — SODIUM CHLORIDE, SODIUM LACTATE, POTASSIUM CHLORIDE, AND CALCIUM CHLORIDE .6; .31; .03; .02 G/100ML; G/100ML; G/100ML; G/100ML
30 INJECTION, SOLUTION INTRAVENOUS
Status: DISCONTINUED | OUTPATIENT
Start: 2021-02-15 | End: 2021-02-18 | Stop reason: HOSPADM

## 2021-02-15 RX ORDER — CEFDINIR 300 MG/1
300 CAPSULE ORAL 2 TIMES DAILY
Status: ON HOLD | COMMUNITY
End: 2021-02-18

## 2021-02-15 RX ORDER — ONDANSETRON 2 MG/ML
4 INJECTION INTRAMUSCULAR; INTRAVENOUS EVERY 4 HOURS PRN
Status: DISCONTINUED | OUTPATIENT
Start: 2021-02-15 | End: 2021-02-18 | Stop reason: HOSPADM

## 2021-02-15 RX ORDER — ACETAMINOPHEN 500 MG
1000 TABLET ORAL ONCE
Status: COMPLETED | OUTPATIENT
Start: 2021-02-15 | End: 2021-02-15

## 2021-02-15 RX ORDER — PREDNISONE 10 MG/1
5 TABLET ORAL EVERY EVENING
Status: DISCONTINUED | OUTPATIENT
Start: 2021-02-15 | End: 2021-02-18 | Stop reason: HOSPADM

## 2021-02-15 RX ORDER — AMOXICILLIN 250 MG
2 CAPSULE ORAL 2 TIMES DAILY
Status: DISCONTINUED | OUTPATIENT
Start: 2021-02-15 | End: 2021-02-18 | Stop reason: HOSPADM

## 2021-02-15 RX ORDER — ATENOLOL 25 MG/1
25 TABLET ORAL EVERY EVENING
Status: DISCONTINUED | OUTPATIENT
Start: 2021-02-15 | End: 2021-02-18 | Stop reason: HOSPADM

## 2021-02-15 RX ORDER — HYDRALAZINE HYDROCHLORIDE 20 MG/ML
20 INJECTION INTRAMUSCULAR; INTRAVENOUS ONCE
Status: COMPLETED | OUTPATIENT
Start: 2021-02-15 | End: 2021-02-15

## 2021-02-15 RX ORDER — SUCRALFATE ORAL 1 G/10ML
1 SUSPENSION ORAL
Status: DISCONTINUED | OUTPATIENT
Start: 2021-02-15 | End: 2021-02-18 | Stop reason: HOSPADM

## 2021-02-15 RX ORDER — MORPHINE SULFATE 4 MG/ML
4 INJECTION, SOLUTION INTRAMUSCULAR; INTRAVENOUS ONCE
Status: COMPLETED | OUTPATIENT
Start: 2021-02-15 | End: 2021-02-15

## 2021-02-15 RX ORDER — ACETAMINOPHEN 325 MG/1
650 TABLET ORAL EVERY 6 HOURS PRN
Status: DISCONTINUED | OUTPATIENT
Start: 2021-02-15 | End: 2021-02-18 | Stop reason: HOSPADM

## 2021-02-15 RX ORDER — ONDANSETRON 2 MG/ML
4 INJECTION INTRAMUSCULAR; INTRAVENOUS ONCE
Status: COMPLETED | OUTPATIENT
Start: 2021-02-15 | End: 2021-02-15

## 2021-02-15 RX ORDER — AMLODIPINE BESYLATE 5 MG/1
10 TABLET ORAL EVERY EVENING
Status: DISCONTINUED | OUTPATIENT
Start: 2021-02-15 | End: 2021-02-18 | Stop reason: HOSPADM

## 2021-02-15 RX ADMIN — MORPHINE SULFATE 4 MG: 4 INJECTION INTRAVENOUS at 01:50

## 2021-02-15 RX ADMIN — HYDROXYCHLOROQUINE SULFATE 200 MG: 200 TABLET, FILM COATED ORAL at 20:34

## 2021-02-15 RX ADMIN — EPOETIN ALFA-EPBX 2000 UNITS: 2000 INJECTION, SOLUTION INTRAVENOUS; SUBCUTANEOUS at 18:10

## 2021-02-15 RX ADMIN — PROCHLORPERAZINE EDISYLATE 10 MG: 5 INJECTION INTRAMUSCULAR; INTRAVENOUS at 12:19

## 2021-02-15 RX ADMIN — MORPHINE SULFATE 4 MG: 4 INJECTION INTRAVENOUS at 05:33

## 2021-02-15 RX ADMIN — HYDRALAZINE HYDROCHLORIDE 20 MG: 20 INJECTION INTRAMUSCULAR; INTRAVENOUS at 03:33

## 2021-02-15 RX ADMIN — PROCHLORPERAZINE EDISYLATE 5 MG: 5 INJECTION INTRAMUSCULAR; INTRAVENOUS at 04:46

## 2021-02-15 RX ADMIN — ONDANSETRON 4 MG: 2 INJECTION INTRAMUSCULAR; INTRAVENOUS at 20:44

## 2021-02-15 RX ADMIN — ACETAMINOPHEN 1000 MG: 500 TABLET, FILM COATED ORAL at 04:24

## 2021-02-15 RX ADMIN — ATENOLOL 25 MG: 25 TABLET ORAL at 20:34

## 2021-02-15 RX ADMIN — SUCRALFATE 1 G: 1 SUSPENSION ORAL at 20:35

## 2021-02-15 RX ADMIN — AMLODIPINE BESYLATE 10 MG: 5 TABLET ORAL at 20:34

## 2021-02-15 RX ADMIN — PREDNISONE 5 MG: 10 TABLET ORAL at 20:34

## 2021-02-15 RX ADMIN — MORPHINE SULFATE 4 MG: 4 INJECTION INTRAVENOUS at 20:43

## 2021-02-15 RX ADMIN — GADOTERIDOL 15 ML: 279.3 INJECTION, SOLUTION INTRAVENOUS at 11:39

## 2021-02-15 RX ADMIN — ONDANSETRON 4 MG: 2 INJECTION INTRAMUSCULAR; INTRAVENOUS at 01:50

## 2021-02-15 RX ADMIN — SODIUM CHLORIDE 3 G: 900 INJECTION INTRAVENOUS at 01:49

## 2021-02-15 RX ADMIN — EPOETIN ALFA-EPBX 10000 UNITS: 10000 INJECTION, SOLUTION INTRAVENOUS; SUBCUTANEOUS at 18:10

## 2021-02-15 RX ADMIN — ONDANSETRON 4 MG: 2 INJECTION INTRAMUSCULAR; INTRAVENOUS at 10:47

## 2021-02-15 RX ADMIN — SUCRALFATE 1 G: 1 SUSPENSION ORAL at 10:48

## 2021-02-15 RX ADMIN — SUCRALFATE 1 G: 1 SUSPENSION ORAL at 06:08

## 2021-02-15 RX ADMIN — SODIUM CHLORIDE: 900 INJECTION INTRAVENOUS at 01:49

## 2021-02-15 RX ADMIN — MORPHINE SULFATE 4 MG: 4 INJECTION INTRAVENOUS at 12:19

## 2021-02-15 ASSESSMENT — LIFESTYLE VARIABLES
TOTAL SCORE: 0
TOTAL SCORE: 0
EVER FELT BAD OR GUILTY ABOUT YOUR DRINKING: NO
CONSUMPTION TOTAL: NEGATIVE
AVERAGE NUMBER OF DAYS PER WEEK YOU HAVE A DRINK CONTAINING ALCOHOL: 0
HOW MANY TIMES IN THE PAST YEAR HAVE YOU HAD 5 OR MORE DRINKS IN A DAY: 0
HAVE PEOPLE ANNOYED YOU BY CRITICIZING YOUR DRINKING: NO
TOTAL SCORE: 0
ON A TYPICAL DAY WHEN YOU DRINK ALCOHOL HOW MANY DRINKS DO YOU HAVE: 0
EVER HAD A DRINK FIRST THING IN THE MORNING TO STEADY YOUR NERVES TO GET RID OF A HANGOVER: NO
ALCOHOL_USE: NO
HAVE YOU EVER FELT YOU SHOULD CUT DOWN ON YOUR DRINKING: NO

## 2021-02-15 ASSESSMENT — ENCOUNTER SYMPTOMS
DIZZINESS: 0
COUGH: 0
SHORTNESS OF BREATH: 0
NECK PAIN: 0
WEAKNESS: 0
BLURRED VISION: 0
BACK PAIN: 1
ABDOMINAL PAIN: 0
PALPITATIONS: 0
BRUISES/BLEEDS EASILY: 0
FEVER: 1
NAUSEA: 0
SORE THROAT: 0
MYALGIAS: 0
DEPRESSION: 0
VOMITING: 0
HEADACHES: 0
FEVER: 0
CHILLS: 1
DOUBLE VISION: 0
INSOMNIA: 0

## 2021-02-15 ASSESSMENT — COGNITIVE AND FUNCTIONAL STATUS - GENERAL
STANDING UP FROM CHAIR USING ARMS: A LITTLE
MOVING TO AND FROM BED TO CHAIR: A LITTLE
MOBILITY SCORE: 18
SUGGESTED CMS G CODE MODIFIER MOBILITY: CK
DAILY ACTIVITIY SCORE: 20
TURNING FROM BACK TO SIDE WHILE IN FLAT BAD: A LITTLE
WALKING IN HOSPITAL ROOM: A LITTLE
HELP NEEDED FOR BATHING: A LITTLE
CLIMB 3 TO 5 STEPS WITH RAILING: A LITTLE
DRESSING REGULAR LOWER BODY CLOTHING: A LITTLE
DRESSING REGULAR UPPER BODY CLOTHING: A LITTLE
SUGGESTED CMS G CODE MODIFIER DAILY ACTIVITY: CJ
MOVING FROM LYING ON BACK TO SITTING ON SIDE OF FLAT BED: A LITTLE
TOILETING: A LITTLE

## 2021-02-15 ASSESSMENT — PAIN DESCRIPTION - PAIN TYPE
TYPE: ACUTE PAIN

## 2021-02-15 ASSESSMENT — FIBROSIS 4 INDEX: FIB4 SCORE: 1.82

## 2021-02-15 ASSESSMENT — PAIN SCALES - WONG BAKER: WONGBAKER_NUMERICALRESPONSE: HURTS A LITTLE MORE

## 2021-02-15 NOTE — PROGRESS NOTES
Med rec updated and complete with antibiotic information (cefdinir) per Pt's pharmacy, [facility] [address] ().

## 2021-02-15 NOTE — ASSESSMENT & PLAN NOTE
- SIRS criteria identified on my evaluation include:  Fever, with temperature greater than 101 deg F and tachycardic with heart rate above 120 bpm.  Noticed that patient had a recent prior admission with fever of unclear etiology from 2/5/2021 2 2/8/2021.   - Fevers and tachycardia have resolved, continue Unasyn  - Thus far, unclear etiology; MRI spine negative, CXR normal after HD, UA negative, COVID negative, no longer has a gallbladder, dopplers negative  - Avascular necrosis seen on plain films, likely secondary to her chronic steroid usage. To get MRI hip this morning to further quantify and rule out infection.  - No positive blood cultures or warmth of AVF to suggest infection  - Had CT with possible SBO last stay, no diarrhea or constipation per patient  - Appreciate Dr Aguilar's insight - CT c/a/p pending, HIV negative, T Pallidum non reactive   - Blood cultures NTD

## 2021-02-15 NOTE — ASSESSMENT & PLAN NOTE
-Seen on chest x-ray, possible underlying infiltrates is not fully excluded at this time.  -Consider repeating chest x-ray after completing hemodialysis session for clarification.

## 2021-02-15 NOTE — ED TRIAGE NOTES
"Chief Complaint   Patient presents with   • Low Back Pain     intermittent bilateral flank pain for 3 days   • Nausea   • Fever     Pt stated fever at home was 100.0 F prior to arrival       BP (!) 184/131   Pulse (!) 128   Temp (!) 38.1 °C (100.6 °F) (Oral)   Resp 20   Ht 1.626 m (5' 4\")   Wt 61.4 kg (135 lb 5.8 oz)   LMP 01/29/2021 (Exact Date)   SpO2 96%   BMI 23.23 kg/m²     "

## 2021-02-15 NOTE — ED PROVIDER NOTES
"CHIEF COMPLAINT  Chief Complaint   Patient presents with   • Low Back Pain     intermittent bilateral flank pain for 3 days   • Nausea   • Fever     Pt stated fever at home was 100.0 F prior to arrival       [facility]  Lily Nicole is a 25 y.o. female who presents tonight with her significant other with a chief complaint of intermittent low back pain, extreme nausea, fever with a T-max of 100.6.  She denies any productive cough, vomiting but states that she is only been able to keep water down today.  She denies any dysuria or hematuria.  She has a history of lupus and is on chronic hemodialysis for that reason in addition to her hypertension.  She is normally dialyzed on Mondays, Wednesdays and Fridays in her dialysis physician is .  She states her bowel movements have been normal.  She was just in the hospital last week for 3 days for an infection of which she states she does not know and was on IV antibiotics for her entire stay and sent home on 3 days of antibiotics.  She states once she stopped taking antibiotics she started feeling poorly again.  She denies chest pain or shortness of breath.    REVIEW OF SYSTEMS  See [facility] for further details. All other systems are negative.    PAST MEDICAL HISTORY  Past Medical History:   Diagnosis Date   • Anemia 01/17/2018   • AVF (arteriovenous fistula) ([facility])     Right Arm   • Dialysis patient ([facility])      dialysis, M,W,F Elizabeth/Joni   • ESRD (end stage renal disease) on dialysis ([facility]) 01/17/2018    Twan Fu   • Heart burn    • Hypertension 01/17/2018    \"Controlled with medication\"   • Indigestion    • Lupus ([facility])    • Migraines 01/17/2018   • Seizure ([facility]) 2013    from high blood pressure, reports 1 time event       FAMILY HISTORY  Family History   Problem Relation Age of Onset   • Diabetes Paternal Grandmother        SOCIAL HISTORY  Social History     Socioeconomic History   • Marital status: Single     Spouse name: Not on file   • Number of children: Not on file "   • Years of education: Not on file   • Highest education level: Not on file   Occupational History   • Not on file   Tobacco Use   • Smoking status: Never Smoker   • Smokeless tobacco: Never Used   Substance and Sexual Activity   • Alcohol use: No   • Drug use: No   • Sexual activity: Not on file   Other Topics Concern   • Not on file   Social History Narrative   • Not on file     Social Determinants of Health     Financial Resource Strain:    • Difficulty of Paying Living Expenses:    Food Insecurity: No Food Insecurity   • Worried About Running Out of Food in the Last Year: Never true   • Ran Out of Food in the Last Year: Never true   Transportation Needs: No Transportation Needs   • Lack of Transportation (Medical): No   • Lack of Transportation (Non-Medical): No   Physical Activity:    • Days of Exercise per Week:    • Minutes of Exercise per Session:    Stress:    • Feeling of Stress :    Social Connections:    • Frequency of Communication with Friends and Family:    • Frequency of Social Gatherings with Friends and Family:    • Attends Yazdanism Services:    • Active Member of Clubs or Organizations:    • Attends Club or Organization Meetings:    • Marital Status:    Intimate Partner Violence:    • Fear of Current or Ex-Partner:    • Emotionally Abused:    • Physically Abused:    • Sexually Abused:        SURGICAL HISTORY  Past Surgical History:   Procedure Laterality Date   • PB COLONOSCOPY,DIAGNOSTIC  1/8/2021    Procedure: COLONOSCOPY;  Surgeon: Herbert Contreras M.D.;  Location: [facility];  Service: Gastroenterology   • PB UPPER GI ENDOSCOPY,DIAGNOSIS  1/8/2021    Procedure: GASTROSCOPY;  Surgeon: Herbert Contreras M.D.;  Location: [facility];  Service: Gastroenterology   • PB UPPER GI ENDOSCOPY,CTRL BLEED  1/8/2021    Procedure: GASTROSCOPY, WITH ARGON PLASMA COAGULATION;  Surgeon: Herbert Contreras M.D.;  Location: [facility];  Service: Gastroenterology   • PB UPPER GI ENDOSCOPY,CTRL  "BLEED  11/12/2020    Procedure: GASTROSCOPY, WITH ARGON PLASMA COAGULATION;  Surgeon: Gadiel Whitney M.D.;  Location: [facility];  Service: Gastroenterology   • PB UPPER GI ENDOSCOPY,BIOPSY  11/12/2020    Procedure: GASTROSCOPY, WITH BIOPSY;  Surgeon: Gadiel Whitney M.D.;  Location: [facility];  Service: Gastroenterology   • GASTROSCOPY-ENDO  11/12/2020    Procedure: GASTROSCOPY;  Surgeon: Gadiel Whitney M.D.;  Location: [facility];  Service: Gastroenterology   • GASTROSCOPY-ENDO  9/18/2020    Procedure: GASTROSCOPY;  Surgeon: Gadiel Whitney M.D.;  Location: [facility];  Service: Gastroenterology   • GASTROSCOPY N/A 5/30/2020    Procedure: GASTROSCOPY;  Surgeon: Waylon Mcqueen M.D.;  Location: [facility];  Service: Gastroenterology   • GASTROSCOPY-ENDO  12/9/2019    Procedure: GASTROSCOPY;  Surgeon: Aaron Kam M.D.;  Location: [facility];  Service: Gastroenterology   • ANGIOPLASTY  01/17/2018    \"Right Arm AV-Fistulagram & Angioplastyx3\"   • ULI BY LAPAROSCOPY  4/5/2010    Performed by SYED MARTELL at [facility]   • AV FISTULA CREATION Right    • OTHER      renal biopsy x 3   • OTHER      bone marrow biopsy       CURRENT MEDICATIONS  See nurses notes    ALLERGIES  Allergies   Allergen Reactions   • Cephalexin Rash     .   • Clindamycin Rash     .   • Methylprednisolone Unspecified     Anxious   • Metoprolol Rash     .       PHYSICAL EXAM  VITAL SIGNS: BP (!) 180/114   Pulse (!) 126   Temp (!) 38.1 °C (100.6 °F) (Oral)   Resp (!) 38   Ht 1.626 m (5' 4\")   Wt 61.4 kg (135 lb 5.8 oz)   LMP 01/29/2021 (Exact Date)   SpO2 (!) 87%   BMI 23.23 kg/m²     Constitutional: Patient is an ill-appearing female in moderate distress.  HENT: Normocephalic, nose is normal with no mucosal edema or drainage.  Oral mucosa dry.  Eyes: PERRL, EOMI  Neck: Supple Normal range of motion in flexion, extension and lateral rotation. " No tenderness along the bony prominences or paraspinal muscles.   Lymphatic: No lymphadenopathy noted.   Cardiovascular: Normal heart rate and rhythm. No murmur  Thorax & Lungs: Diminished breath sounds with good excursion. No respiratory distress, no rhonchi, wheezing or rales.  Abdomen: Bowel sounds normal in all four quadrants. Soft,nontender, no rebound , guarding, palpable masses.   Skin: Warm, Dry, very sallow appearing  Back: No cervical, thoracic tenderness. No CVA tenderness.  Patient is tender in the lumbar spine.    Extremities: Peripheral pulses 4/4 No edema, No tenderness.  Musculoskeletal: Normal range of motion in all major joints. No tenderness to palpation or major deformities noted.   Neurologic: Alert & oriented x 3, Normal motor function, Normal sensory function, DTR's 4/4 bilaterally.  Psychiatric: Affect normal, Judgment normal, Mood normal.     EKG  Results for orders placed or performed during the hospital encounter of 02/15/21   Prothrombin time (INR)   Result Value Ref Range    PT 14.0 12.0 - 14.6 sec    INR 1.11 0.87 - 1.13   CBC with Differential   Result Value Ref Range    WBC 8.2 4.8 - 10.8 K/uL    RBC 2.72 (L) 4.20 - 5.40 M/uL    Hemoglobin 7.3 (L) 12.0 - 16.0 g/dL    Hematocrit 24.4 (L) 37.0 - 47.0 %    MCV 89.7 81.4 - 97.8 fL    MCH 26.8 (L) 27.0 - 33.0 pg    MCHC 29.9 (L) 33.6 - 35.0 g/dL    RDW 59.2 (H) 35.9 - 50.0 fL    Platelet Count 92 (L) 164 - 446 K/uL    MPV 11.3 9.0 - 12.9 fL    Neutrophils-Polys 84.40 (H) 44.00 - 72.00 %    Lymphocytes 9.50 (L) 22.00 - 41.00 %    Monocytes 5.50 0.00 - 13.40 %    Eosinophils 0.00 0.00 - 6.90 %    Basophils 0.20 0.00 - 1.80 %    Immature Granulocytes 0.40 0.00 - 0.90 %    Nucleated RBC 0.00 /100 WBC    Neutrophils (Absolute) 6.90 2.00 - 7.15 K/uL    Lymphs (Absolute) 0.78 (L) 1.00 - 4.80 K/uL    Monos (Absolute) 0.45 0.00 - 0.85 K/uL    Eos (Absolute) 0.00 0.00 - 0.51 K/uL    Baso (Absolute) 0.02 0.00 - 0.12 K/uL    Immature Granulocytes  (abs) 0.03 0.00 - 0.11 K/uL    NRBC (Absolute) 0.00 K/uL   Comp Metabolic Panel   Result Value Ref Range    Sodium 127 (L) 135 - 145 mmol/L    Potassium 4.5 3.6 - 5.5 mmol/L    Chloride 87 (L) 96 - 112 mmol/L    Co2 23 20 - 33 mmol/L    Anion Gap 17.0 (H) 7.0 - 16.0    Glucose 94 65 - 99 mg/dL    Bun 46 (H) 8 - 22 mg/dL    Creatinine 8.22 (HH) 0.50 - 1.40 mg/dL    Calcium 8.7 8.4 - 10.2 mg/dL    AST(SGOT) 23 12 - 45 U/L    ALT(SGPT) 14 2 - 50 U/L    Alkaline Phosphatase 83 30 - 99 U/L    Total Bilirubin 0.5 0.1 - 1.5 mg/dL    Albumin 3.3 3.2 - 4.9 g/dL    Total Protein 8.0 6.0 - 8.2 g/dL    Globulin 4.7 (H) 1.9 - 3.5 g/dL    A-G Ratio 0.7 g/dL   PROCALCITONIN   Result Value Ref Range    Procalcitonin 1.46 (H) <0.25 ng/mL   LACTIC ACID   Result Value Ref Range    Lactic Acid 1.2 0.5 - 2.0 mmol/L   HCG QUAL SERUM   Result Value Ref Range    Beta-Hcg Qualitative Serum Negative Negative   ESTIMATED GFR   Result Value Ref Range    GFR If  7 (A) >60 mL/min/1.73 m 2    GFR If Non African American 6 (A) >60 mL/min/1.73 m 2         RADIOLOGY/PROCEDURES  DX-CHEST-PORTABLE (1 VIEW)   Final Result         1.  Hazy pulmonary opacities suggests infiltrates or edema.   2.  Cardiomegaly            COURSE & MEDICAL DECISION MAKING  Pertinent Labs & Imaging studies reviewed. (See chart for details)  Patient received a Hep-Lock IV, she was maintained on oxygen and cardiac telemetry.  Laboratories were drawn showing a white count of 8.2 with a hemoglobin of 7.3 hematocrit 24.4 which is decreased by 1 unit from last week alone.  She has 84% neutrophils.  Her electrolytes show sodium of 127, chloride 87, anion gap of 17 with a BUN of 46 and a creatinine of 8.22.  Liver enzymes are unremarkable.  Her pro calcitonin level is 1.46 which is high and has been high over the last few admissions.  Lactic acid is normal at 1.2, pregnancy test is negative.  Chest x-ray shows hazy pulmonary opacities consistent with infiltrates  versus pulmonary edema she also has significant cardiomegaly.  Sepsis protocol was initiated she was treated with Unasyn 3 g IV piggyback which will cover both urine and pulmonary infections.  Patient has not produced any urine as she urinated just prior to arrival tonight and she is on hemodialysis.  Plans will be to dialyze her tomorrow, treat her for sepsis, nausea and fever control.  I spoke with Dr. Madison and the patient will be admitted into the hospital she is currently in guarded condition.    FINAL IMPRESSION  1.  Fever  2.  Uncontrolled hypertension  3.  Chronic kidney disease on dialysis  4.  History of lupus  5.  Sepsis  6.  Chronic midline low back pain  7.  Nausea  8.  Pneumonia versus pulmonary edema         Electronically signed by: Alix Huntley D.O., 2/15/2021 3:53 [facility] Provider Note

## 2021-02-15 NOTE — PROGRESS NOTES
Med rec PARTIAL per Pt at bedside.  Pt reports taking an antibiotic within the last 2 weeks but is unable to recall the name. Unable to verify with Pt's pharmacy [facility] [address] () as it is closed at this time; pharmacy reopens at 0900.  Allergies reviewed with Pt.

## 2021-02-15 NOTE — DISCHARGE PLANNING
Care Transition Team Assessment    Pt a readmit from earlier this month. Information gathered for this assessment through previous assessment completed by Carly RUIZ;     CTT assessment at bedside in ER with patient and her mother.   Pt with history of multiple admits r/t lupus diagnosis at 14 years. Pt is a dialysis pt. Pt is primarily independent at home, she lives with her parents and sister. When she needs assistance it is provided by her family. Pt reports one of her parents are home with most of the time, she does have a medical alarm pendant if she is alone. MOP reports great concern and fear when pt is home alone. OP pharmacy is [facility] in [place]. Pt tearful off and on during interview, discussed stressors and mental health concerns. Encouraged pt to seek OP therapy. Discussed resources verbally with pt and MOP. Pt and MOP deny needs at this time.  IP CM team will monitor for ongoing needs.        LSW to continue to follow for d/c needs. No known needs at this time.       Information Source  Information Given By: Other (Comments)  Who is responsible for making decisions for patient? : Patient    Elopement Risk  Legal Hold: No  Ambulatory or Self Mobile in Wheelchair: Yes  Disoriented: No  Psychiatric Symptoms: None  History of Wandering: No  Elopement this Admit: No  Vocalizing Wanting to Leave: No  Displays Behaviors, Body Language Wanting to Leave: No-Not at Risk for Elopement  Elopement Risk: Not at Risk for Elopement    Interdisciplinary Discharge Planning  Patient or legal guardian wants to designate a caregiver: No    Discharge Preparedness  What is your plan after discharge?: Home with help  What are your discharge supports?: Spouse    Finances  Financial Barriers to Discharge: No  Prescription Coverage: Yes    Domestic Abuse  Have you ever been the victim of abuse or violence?: No  Physical Abuse or Sexual Abuse: No  Verbal Abuse or Emotional Abuse: No  Possible Abuse/Neglect  Reported to:: Not Applicable    Discharge Risks or Barriers  Discharge risks or barriers?: No

## 2021-02-15 NOTE — PROGRESS NOTES
Patient just admitted early this morning at 3:50 AM.  Patient is admitted with possible SIRS.  Continue antibiotics follow cultures.  Patient will need dialysis this nephrology will be contacted.

## 2021-02-15 NOTE — DISCHARGE PLANNING
Outpatient Dialysis Note    Confirmed patient is active at:    [address] [address]    Schedule: Monday, Wednesday, Friday   Time: 06:00am    Patient is seen by Dr. Trent in HD clinic.    Spoke with Leila at facility who confirmed.    Forwarded records for review.    Mary Han- Dialysis Coordinator  [facility] # [phone]

## 2021-02-15 NOTE — CARE PLAN
Received report from day shift nurse.   Assumed pt care   Pt is A&Ox4, resting comfortably in bed.   Pt on 2 liters nasal canula. No signs of SOB/respiratory distress.   Labs noted.    Pt c/o 9/10 pain at this moment, medicated per MAR.    Assessment completed, complains of nausea and lower back pain. States she is feeling very weak.   Fall precautions in place.   Bed at lowest position.   Call light and personal belongings within reach. Continue to monitor         Problem: Knowledge Deficit  Goal: Knowledge of disease process/condition, treatment plan, diagnostic tests, and medications will improve  Outcome: PROGRESSING AS EXPECTED     Problem: Pain Management  Goal: Pain level will decrease to patient's comfort goal  Outcome: PROGRESSING AS EXPECTED

## 2021-02-15 NOTE — DISCHARGE PLANNING
Outpatient Dialysis Note    Confirmed patient is active at:    [address] [address]    Schedule: Monday, Wednesday, Friday   Time: 06:00am    Patient is seen by Dr. Trent in HD clinic.    Spoke with Leila at facility who confirmed.    Forwarded records for review.    Mary Han- Dialysis Coordinator  [facility] # [phone]

## 2021-02-15 NOTE — PROGRESS NOTES
2 RN skin assessment completed. Skin is WDL except for white discoloration of skin on the upper back and arms, fistula is also present on RUE.

## 2021-02-15 NOTE — H&P
Hospital Medicine History & Physical Note    Date of Service  2/15/2021    Primary Care Physician  Ella Matthew M.D.    Consultants  Please call Nephrology in am to arrange non-emergency HD. Dr. Trent is her Nephrologist.     On recent prior admission, she was followed by Dr. Usman GUERRIER    Code Status  Full Code    Chief Complaint  Chief Complaint   Patient presents with   • Low Back Pain     intermittent bilateral flank pain for 3 days   • Nausea   • Fever     Pt stated fever at home was 100.0 F prior to arrival       History of Presenting Illness  25 y.o. female, with history of end-stage renal disease/nephritis due to lupus on hemodialysis Monday, Wednesday, and Friday (Dr. Trent), recently admitted to this hospital from 2/5/2021 discharged on 2/8/2021 with high fevers, elevated procalcitonin, CRP.  Work-up was negative for clear source of infection.  She had negative blood cultures, negative urine cultures cardiomegaly but no pneumonia.  She is mainly complaining of a new onset of back pain, mostly on her lumbar spine since the beginning of February therefore a CT scan of the lumbar spine was also done.  ID, Dr. Mary was following.  She was on Rocephin here in the ED and completed a 5-day course of empiric antibiotic with significant improvement.  Discharged on cefdinir that she just completed course 5 days ago.  She was told to come back to the ER if having any fever and comes today on 2/15/2021 reporting temperature of 101 at home.    ER COURSE:  -Initial temperature was 100.6 in the ER with maximum temperature of 101.5, tachycardic with heart rate around 120 bpm.  -Blood pressure significantly elevated, most recent 164/100.  -Her WBC is within normal limits at 8.2.  -Chemistry panel showing sodium of 127 within the context of chronic hyponatremia.  -Lactic is 1.2.  -She was unable to provide urine sample here in the ED  -Chest x-ray showing hazy pulmonary opacities suggesting infiltrates or edema.  -She  is started on Unasyn empirically here in the ED and I was called for admission    Review of Systems  Review of Systems   Constitutional: Positive for chills, fever and malaise/fatigue.   HENT: Negative for congestion and sore throat.    Eyes: Negative for blurred vision and double vision.   Respiratory: Negative for cough and shortness of breath.    Cardiovascular: Negative for chest pain and palpitations.   Gastrointestinal: Negative for nausea and vomiting.   Genitourinary: Negative for dysuria and urgency.   Musculoskeletal: Positive for back pain. Negative for myalgias and neck pain.   Skin: Negative for itching and rash.   Neurological: Negative for dizziness, weakness and headaches.   Endo/Heme/Allergies: Does not bruise/bleed easily.   Psychiatric/Behavioral: Negative for depression. The patient does not have insomnia.        Past Medical History   has a past medical history of Anemia (01/17/2018), AVF (arteriovenous fistula) (formerly [facility]), Dialysis patient (formerly [facility]), ESRD (end stage renal disease) on dialysis (formerly [facility]) (01/17/2018), Heart burn, Hypertension (01/17/2018), Indigestion, Lupus (formerly [facility]), Migraines (01/17/2018), and Seizure (formerly [facility]) (2013).    Surgical History   has a past surgical history that includes ronak by laparoscopy (4/5/2010); av fistula creation (Right); angioplasty (01/17/2018); other; other; gastroscopy-endo (12/9/2019); gastroscopy (N/A, 5/30/2020); gastroscopy-endo (9/18/2020); pr upper gi endoscopy,ctrl bleed (11/12/2020); pr upper gi endoscopy,biopsy (11/12/2020); gastroscopy-endo (11/12/2020); pr colonoscopy,diagnostic (1/8/2021); pr upper gi endoscopy,diagnosis (1/8/2021); and pr upper gi endoscopy,ctrl bleed (1/8/2021).     Family History  family history includes Diabetes in her paternal grandmother.     Social History   reports that she has never smoked. She has never used smokeless tobacco. She reports that she does not drink alcohol and does not use drugs.    Allergies  Allergies   Allergen  Reactions   • Cephalexin Rash     .   • Clindamycin Rash     .   • Methylprednisolone Unspecified     Anxious   • Metoprolol Rash     .       Medications  Prior to Admission Medications   Prescriptions Last Dose Informant Patient Reported? Taking?   acetaminophen (TYLENOL) 500 MG Tab  Patient Yes No   Sig: Take 500 mg by mouth every 6 hours as needed for Moderate Pain.   amLODIPine (NORVASC) 10 MG Tab  Patient Yes No   Sig: Take 10 mg by mouth every evening.   atenolol (TENORMIN) 25 MG Tab  Patient Yes No   Sig: Take 25 mg by mouth every evening.   furosemide (LASIX) 40 MG Tab  Patient Yes No   Sig: Take 40 mg by mouth 2 (two) times a day.   hydroxychloroquine (PLAQUENIL) 200 MG Tab  Patient Yes No   Sig: Take 200 mg by mouth every evening.   mycophenolate (MYFORTIC) 180 MG EC tablet  Patient No No   Sig: Take 1 Tab by mouth every evening.   ondansetron (ZOFRAN ODT) 4 MG TABLET DISPERSIBLE  Patient No No   Sig: Take 1 Tab by mouth every four hours as needed for Nausea (give PO if no IV route available).   pantoprazole (PROTONIX) 40 MG Tablet Delayed Response  Patient No No   Sig: Take 1 Tab by mouth 2 times a day.   predniSONE (DELTASONE) 5 MG Tab  Patient Yes No   Sig: Take 5 mg by mouth every evening.   sucralfate (CARAFATE) 1 GM/10ML Suspension  Patient No No   Sig: Take 10 mL by mouth 4 Times a Day,Before Meals and at Bedtime.   Patient taking differently: Take 1 g by mouth 2 times a day.      Facility-Administered Medications: None       Physical Exam  Temp:  [38.1 °C (100.6 °F)] 38.1 °C (100.6 °F)  Pulse:  [120-128] 126  Resp:  [17-38] 38  BP: (170-184)/(113-131) 180/114  SpO2:  [87 %-96 %] 87 %    Physical Exam  Constitutional:       Appearance: Normal appearance.      Comments: Looks uncomfortable   HENT:      Head: Normocephalic and atraumatic.      Mouth/Throat:      Mouth: Mucous membranes are moist.      Pharynx: Oropharynx is clear.   Eyes:      Extraocular Movements: Extraocular movements intact.       Pupils: Pupils are equal, round, and reactive to light.   Cardiovascular:      Rate and Rhythm: Normal rate and regular rhythm.      Heart sounds: Normal heart sounds.   Pulmonary:      Effort: Pulmonary effort is normal.      Breath sounds: Normal breath sounds.   Abdominal:      General: Abdomen is flat. Bowel sounds are normal.      Palpations: Abdomen is soft.   Musculoskeletal:      Cervical back: Normal range of motion and neck supple.      Comments: Right arm AV fistula noted with good thrill.    Skin:     General: Skin is warm and dry.   Neurological:      General: No focal deficit present.      Mental Status: She is alert and oriented to person, place, and time.   Psychiatric:         Mood and Affect: Mood normal.         Behavior: Behavior normal.         Laboratory:  Recent Labs     02/15/21  0140   WBC 8.2   RBC 2.72*   HEMOGLOBIN 7.3*   HEMATOCRIT 24.4*   MCV 89.7   MCH 26.8*   MCHC 29.9*   RDW 59.2*   PLATELETCT 92*   MPV 11.3     Recent Labs     02/15/21  0140   SODIUM 127*   POTASSIUM 4.5   CHLORIDE 87*   CO2 23   GLUCOSE 94   BUN 46*   CREATININE 8.22*   CALCIUM 8.7     Recent Labs     02/15/21  0140   ALTSGPT 14   ASTSGOT 23   ALKPHOSPHAT 83   TBILIRUBIN 0.5   GLUCOSE 94     Recent Labs     02/15/21  0140   INR 1.11     No results for input(s): NTPROBNP in the last 72 hours.      No results for input(s): TROPONINT in the last 72 hours.    Imaging:  DX-CHEST-PORTABLE (1 VIEW)   Final Result         1.  Hazy pulmonary opacities suggests infiltrates or edema.   2.  Cardiomegaly            Assessment/Plan:  I anticipate this patient is appropriate for observation status at this time.    * SIRS (systemic inflammatory response syndrome) (HCC)  Assessment & Plan  SIRS criteria identified on my evaluation include:  Fever, with temperature greater than 101 deg F and tachycardic with heart rate above 120 bpm.  Noticed that patient had a recent prior admission with fever of unclear etiology from 2/5/2021 2  2/8/2021.  ID was following and she was treated with empiric IV antibiotics and discharged home with p.o. antibiotics.  Fever returns after patient completes antibiotic treatment, therefore I suspect she does have underlying infection that is a still undiagnosed at the time of the admission  Additionally, she has persistently elevated procalcitonin, 1.45 on admission.  Noticed that prior procalcitonin was 1.65 on 2/5/2021  Patient had blood cultures done in the emergency department.  Both blood cultures and urine cultures done on last admission were negative.  ID, Dr. Mary was following her during her last admission, please notify ID department for consult in the morning.  I appreciate consult and recommendations.  Adding an echocardiogram and MRI of her lumbar spine as she is complaining of severe back pain ever since the beginning of this month.  Even though patient does not have objective weakness and or any bladder component, I am concerned with her ongoing high fevers without a clear source of infection and back pain is the only complaint she is persistently having this month.      Acute pulmonary edema (HCC)- (present on admission)  Assessment & Plan  -Seen on chest x-ray, possible underlying infiltrates is not fully excluded at this time.  -Consider repeating chest x-ray after completing hemodialysis session for clarification.    Lupus (HCC)- (present on admission)  Assessment & Plan  Continue mycophenolate, Plaquenil and prednisone.  Most recent lupus flare was in December 2020.    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  Please call nephrology services to arrange nonemergency dialysis today as she has dialysis on Monday, Wednesdays and Fridays.  Her nephrologist is Dr. Trent      DVT Ppx: SCD's

## 2021-02-16 ENCOUNTER — APPOINTMENT (OUTPATIENT)
Dept: RADIOLOGY | Facility: MEDICAL CENTER | Age: 26
DRG: 864 | End: 2021-02-16
Attending: FAMILY MEDICINE
Payer: COMMERCIAL

## 2021-02-16 LAB
ANION GAP SERPL CALC-SCNC: 11 MMOL/L (ref 7–16)
BUN SERPL-MCNC: 25 MG/DL (ref 8–22)
CALCIUM SERPL-MCNC: 8.9 MG/DL (ref 8.4–10.2)
CHLORIDE SERPL-SCNC: 94 MMOL/L (ref 96–112)
CO2 SERPL-SCNC: 28 MMOL/L (ref 20–33)
CREAT SERPL-MCNC: 5.24 MG/DL (ref 0.5–1.4)
ERYTHROCYTE [DISTWIDTH] IN BLOOD BY AUTOMATED COUNT: 58.4 FL (ref 35.9–50)
GLUCOSE SERPL-MCNC: 92 MG/DL (ref 65–99)
HCT VFR BLD AUTO: 26.2 % (ref 37–47)
HGB BLD-MCNC: 7.7 G/DL (ref 12–16)
MCH RBC QN AUTO: 26.6 PG (ref 27–33)
MCHC RBC AUTO-ENTMCNC: 29.4 G/DL (ref 33.6–35)
MCV RBC AUTO: 90.3 FL (ref 81.4–97.8)
PLATELET # BLD AUTO: 88 K/UL (ref 164–446)
PMV BLD AUTO: 11.8 FL (ref 9–12.9)
POTASSIUM SERPL-SCNC: 4.7 MMOL/L (ref 3.6–5.5)
RBC # BLD AUTO: 2.9 M/UL (ref 4.2–5.4)
SODIUM SERPL-SCNC: 133 MMOL/L (ref 135–145)
U1 SNRNP IGG SER QL: NORMAL
WBC # BLD AUTO: 4.2 K/UL (ref 4.8–10.8)

## 2021-02-16 PROCEDURE — 700111 HCHG RX REV CODE 636 W/ 250 OVERRIDE (IP): Performed by: HOSPITALIST

## 2021-02-16 PROCEDURE — 99255 IP/OBS CONSLTJ NEW/EST HI 80: CPT | Performed by: INTERNAL MEDICINE

## 2021-02-16 PROCEDURE — 770020 HCHG ROOM/CARE - TELE (206)

## 2021-02-16 PROCEDURE — 5A1D70Z PERFORMANCE OF URINARY FILTRATION, INTERMITTENT, LESS THAN 6 HOURS PER DAY: ICD-10-PCS | Performed by: INTERNAL MEDICINE

## 2021-02-16 PROCEDURE — 700102 HCHG RX REV CODE 250 W/ 637 OVERRIDE(OP): Performed by: HOSPITALIST

## 2021-02-16 PROCEDURE — 73522 X-RAY EXAM HIPS BI 3-4 VIEWS: CPT

## 2021-02-16 PROCEDURE — 85027 COMPLETE CBC AUTOMATED: CPT

## 2021-02-16 PROCEDURE — 99233 SBSQ HOSP IP/OBS HIGH 50: CPT | Performed by: FAMILY MEDICINE

## 2021-02-16 PROCEDURE — 93970 EXTREMITY STUDY: CPT | Mod: 26 | Performed by: INTERNAL MEDICINE

## 2021-02-16 PROCEDURE — 80048 BASIC METABOLIC PNL TOTAL CA: CPT

## 2021-02-16 PROCEDURE — A9270 NON-COVERED ITEM OR SERVICE: HCPCS | Performed by: FAMILY MEDICINE

## 2021-02-16 PROCEDURE — 700102 HCHG RX REV CODE 250 W/ 637 OVERRIDE(OP): Performed by: FAMILY MEDICINE

## 2021-02-16 PROCEDURE — A9270 NON-COVERED ITEM OR SERVICE: HCPCS | Performed by: HOSPITALIST

## 2021-02-16 PROCEDURE — 93970 EXTREMITY STUDY: CPT

## 2021-02-16 PROCEDURE — 71046 X-RAY EXAM CHEST 2 VIEWS: CPT

## 2021-02-16 PROCEDURE — 90935 HEMODIALYSIS ONE EVALUATION: CPT

## 2021-02-16 PROCEDURE — 36415 COLL VENOUS BLD VENIPUNCTURE: CPT

## 2021-02-16 PROCEDURE — 700105 HCHG RX REV CODE 258: Performed by: HOSPITALIST

## 2021-02-16 RX ORDER — MYCOPHENOLIC ACID 180 MG/1
180 TABLET, DELAYED RELEASE ORAL EVERY EVENING
Status: DISCONTINUED | OUTPATIENT
Start: 2021-02-16 | End: 2021-02-18 | Stop reason: HOSPADM

## 2021-02-16 RX ORDER — OMEPRAZOLE 20 MG/1
20 CAPSULE, DELAYED RELEASE ORAL 2 TIMES DAILY
Status: DISCONTINUED | OUTPATIENT
Start: 2021-02-16 | End: 2021-02-18 | Stop reason: HOSPADM

## 2021-02-16 RX ADMIN — ONDANSETRON 4 MG: 2 INJECTION INTRAMUSCULAR; INTRAVENOUS at 06:47

## 2021-02-16 RX ADMIN — SUCRALFATE 1 G: 1 SUSPENSION ORAL at 21:33

## 2021-02-16 RX ADMIN — SUCRALFATE 1 G: 1 SUSPENSION ORAL at 17:40

## 2021-02-16 RX ADMIN — SUCRALFATE 1 G: 1 SUSPENSION ORAL at 10:51

## 2021-02-16 RX ADMIN — SODIUM CHLORIDE 3 G: 900 INJECTION INTRAVENOUS at 01:53

## 2021-02-16 RX ADMIN — HYDROXYCHLOROQUINE SULFATE 200 MG: 200 TABLET, FILM COATED ORAL at 17:40

## 2021-02-16 RX ADMIN — SUCRALFATE 1 G: 1 SUSPENSION ORAL at 08:52

## 2021-02-16 RX ADMIN — MORPHINE SULFATE 4 MG: 4 INJECTION INTRAVENOUS at 17:39

## 2021-02-16 RX ADMIN — MYCOPHENOLIC ACID 180 MG: 180 TABLET, DELAYED RELEASE ORAL at 18:00

## 2021-02-16 RX ADMIN — ONDANSETRON 4 MG: 2 INJECTION INTRAMUSCULAR; INTRAVENOUS at 17:40

## 2021-02-16 RX ADMIN — MORPHINE SULFATE 4 MG: 4 INJECTION INTRAVENOUS at 21:49

## 2021-02-16 RX ADMIN — PREDNISONE 5 MG: 10 TABLET ORAL at 17:42

## 2021-02-16 RX ADMIN — OMEPRAZOLE 20 MG: 20 CAPSULE, DELAYED RELEASE ORAL at 17:41

## 2021-02-16 RX ADMIN — ATENOLOL 25 MG: 25 TABLET ORAL at 17:41

## 2021-02-16 RX ADMIN — AMLODIPINE BESYLATE 10 MG: 5 TABLET ORAL at 17:41

## 2021-02-16 RX ADMIN — SENNOSIDES AND DOCUSATE SODIUM 2 TABLET: 8.6; 5 TABLET ORAL at 17:41

## 2021-02-16 RX ADMIN — ONDANSETRON 4 MG: 4 TABLET, ORALLY DISINTEGRATING ORAL at 21:43

## 2021-02-16 RX ADMIN — MORPHINE SULFATE 4 MG: 4 INJECTION INTRAVENOUS at 01:45

## 2021-02-16 RX ADMIN — SENNOSIDES AND DOCUSATE SODIUM 2 TABLET: 8.6; 5 TABLET ORAL at 08:52

## 2021-02-16 RX ADMIN — MORPHINE SULFATE 4 MG: 4 INJECTION INTRAVENOUS at 06:46

## 2021-02-16 RX ADMIN — ONDANSETRON 4 MG: 2 INJECTION INTRAMUSCULAR; INTRAVENOUS at 01:45

## 2021-02-16 RX ADMIN — OMEPRAZOLE 20 MG: 20 CAPSULE, DELAYED RELEASE ORAL at 10:51

## 2021-02-16 ASSESSMENT — ENCOUNTER SYMPTOMS
SINUS PAIN: 0
HEADACHES: 0
DIARRHEA: 0
COUGH: 0
NERVOUS/ANXIOUS: 0
NAUSEA: 0
WEAKNESS: 0
SORE THROAT: 0
SHORTNESS OF BREATH: 0
SPUTUM PRODUCTION: 0
FEVER: 1
BACK PAIN: 0
CHILLS: 0
DOUBLE VISION: 0
ABDOMINAL PAIN: 0
MYALGIAS: 0
VOMITING: 0
BLURRED VISION: 0
CONSTIPATION: 0
NAUSEA: 1

## 2021-02-16 ASSESSMENT — PAIN DESCRIPTION - PAIN TYPE
TYPE: ACUTE PAIN

## 2021-02-16 NOTE — CONSULTS
Consults   INFECTIOUS DISEASES INPATIENT CONSULT NOTE     Date of Service: 2/16/2021    Consult Requested By: Tamra Zapata M.D.    Reason for Consultation: Fevers in the setting of immune suppression    History of Present Illness:   Lily Nicole is a 25 y.o.  admitted 2/15/2021. Pt has a past medical history of SLE and lupus nephritis with end-stage renal disease and on hemodialysis.  She is immune suppressed on prednisone 5 mg daily, mycophenolate and Plaquenil. She was recently admitted with fevers elevated procalcitonin and underwent work-up which was negative for infection.  She was given ceftriaxone and then discharged with oral antibiotics for additional 5 days.  She now returns complaining of new onset back pain, hip pain and fevers at home.    Hospital Course:   Fevers in the ER to 101.5 and tachycardia, now improved.  WBC of 8.2 likely represents leukocytosis given her chronic low levels.  Blood cultures were obtained and she was started on Unasyn.  This x-ray unremarkable MRI spine unremarkable.  Ultrasound of bilateral lower extremities with no DVT.    Review Of Systems:  Review of Systems   Constitutional: Positive for fever. Negative for chills and malaise/fatigue.   HENT: Negative for hearing loss and tinnitus.    Eyes: Negative for blurred vision and double vision.   Respiratory: Negative for cough, sputum production and shortness of breath.    Cardiovascular: Negative for chest pain and leg swelling.   Gastrointestinal: Positive for nausea. Negative for abdominal pain, constipation and diarrhea.   Genitourinary: Negative for dysuria.   Musculoskeletal: Negative for back pain, joint pain and myalgias.   Skin: Negative for rash.   Neurological: Negative for weakness and headaches.   Psychiatric/Behavioral: The patient is not nervous/anxious.          PMH:   Past Medical History:   Diagnosis Date   • Anemia 01/17/2018   • AVF (arteriovenous fistula) (HCC)     Right Arm   • Dialysis patient  "(HCC)      dialysis, M,W,F Elizabeth/Joni   • ESRD (end stage renal disease) on dialysis (HCC) 01/17/2018    Twan Fu   • Heart burn    • Hypertension 01/17/2018    \"Controlled with medication\"   • Indigestion    • Lupus (HCC)    • Migraines 01/17/2018   • Seizure (HCC) 2013    from high blood pressure, reports 1 time event       PSH:  Past Surgical History:   Procedure Laterality Date   • PB COLONOSCOPY,DIAGNOSTIC  1/8/2021    Procedure: COLONOSCOPY;  Surgeon: Herbert Contreras M.D.;  Location: [facility];  Service: Gastroenterology   • PB UPPER GI ENDOSCOPY,DIAGNOSIS  1/8/2021    Procedure: GASTROSCOPY;  Surgeon: Herbert Contreras M.D.;  Location: [facility];  Service: Gastroenterology   • PB UPPER GI ENDOSCOPY,CTRL BLEED  1/8/2021    Procedure: GASTROSCOPY, WITH ARGON PLASMA COAGULATION;  Surgeon: Herbert Contreras M.D.;  Location: [facility];  Service: Gastroenterology   • PB UPPER GI ENDOSCOPY,CTRL BLEED  11/12/2020    Procedure: GASTROSCOPY, WITH ARGON PLASMA COAGULATION;  Surgeon: Gadiel Whitney M.D.;  Location: [facility];  Service: Gastroenterology   • PB UPPER GI ENDOSCOPY,BIOPSY  11/12/2020    Procedure: GASTROSCOPY, WITH BIOPSY;  Surgeon: Gadiel Whitney M.D.;  Location: [facility];  Service: Gastroenterology   • GASTROSCOPY-ENDO  11/12/2020    Procedure: GASTROSCOPY;  Surgeon: Gadiel Whitney M.D.;  Location: [facility];  Service: Gastroenterology   • GASTROSCOPY-ENDO  9/18/2020    Procedure: GASTROSCOPY;  Surgeon: Gadiel Whitney M.D.;  Location: [facility];  Service: Gastroenterology   • GASTROSCOPY N/A 5/30/2020    Procedure: GASTROSCOPY;  Surgeon: Waylon Mcqueen M.D.;  Location: [facility];  Service: Gastroenterology   • GASTROSCOPY-ENDO  12/9/2019    Procedure: GASTROSCOPY;  Surgeon: Aaron Kam M.D.;  Location: [facility];  Service: Gastroenterology   • ANGIOPLASTY  01/17/2018    \"Right Arm " "AV-Fistulagram & Angioplastyx3\"   • ULI BY LAPAROSCOPY  4/5/2010    Performed by SYED MARTELL at SURGERY [facility]   • AV FISTULA CREATION Right    • OTHER      renal biopsy x 3   • OTHER      bone marrow biopsy       FAMILY HX:  Family History   Problem Relation Age of Onset   • Diabetes Paternal Grandmother        SOCIAL HX:  Social History     Socioeconomic History   • Marital status: Single     Spouse name: Not on file   • Number of children: Not on file   • Years of education: Not on file   • Highest education level: Not on file   Occupational History   • Not on file   Tobacco Use   • Smoking status: Never Smoker   • Smokeless tobacco: Never Used   Substance and Sexual Activity   • Alcohol use: No   • Drug use: No   • Sexual activity: Not on file   Other Topics Concern   • Not on file   Social History Narrative   • Not on file     Social Determinants of Health     Financial Resource Strain:    • Difficulty of Paying Living Expenses:    Food Insecurity: No Food Insecurity   • Worried About Running Out of Food in the Last Year: Never true   • Ran Out of Food in the Last Year: Never true   Transportation Needs: No Transportation Needs   • Lack of Transportation (Medical): No   • Lack of Transportation (Non-Medical): No   Physical Activity:    • Days of Exercise per Week:    • Minutes of Exercise per Session:    Stress:    • Feeling of Stress :    Social Connections:    • Frequency of Communication with Friends and Family:    • Frequency of Social Gatherings with Friends and Family:    • Attends Synagogue Services:    • Active Member of Clubs or Organizations:    • Attends Club or Organization Meetings:    • Marital Status:    Intimate Partner Violence:    • Fear of Current or Ex-Partner:    • Emotionally Abused:    • Physically Abused:    • Sexually Abused:      Social History     Tobacco Use   Smoking Status Never Smoker   Smokeless Tobacco Never Used     Social History     Substance and Sexual " Activity   Alcohol Use No       Allergies/Intolerances:  Allergies   Allergen Reactions   • Cephalexin Rash     .   • Clindamycin Rash     .   • Methylprednisolone Unspecified     Anxious   • Metoprolol Rash     .       History reviewed with the patient     Other Current Medications:    Current Facility-Administered Medications:   •  omeprazole (PRILOSEC) capsule 20 mg, 20 mg, Oral, BID, Tamra Zapata M.D., 20 mg at 02/16/21 1051  •  mycophenolate (MYFORTIC) TBEC 180 mg, 180 mg, Oral, Q EVENING, Tamra Zapata M.D.  •  lactated ringers infusion (BOLUS): BMI less than or equal to 30, 30 mL/kg, Intravenous, Once PRN, Alix Huntley D.O.  •  amLODIPine (NORVASC) tablet 10 mg, 10 mg, Oral, Q EVENING, Argentina Chase M.D., 10 mg at 02/15/21 2034  •  atenolol (TENORMIN) tablet 25 mg, 25 mg, Oral, Q EVENING, Argentina Chase M.D., 25 mg at 02/15/21 2034  •  hydroxychloroquine (Plaquenil) tablet 200 mg, 200 mg, Oral, Q EVENING, Argentina Chase M.D., 200 mg at 02/15/21 2034  •  predniSONE (DELTASONE) tablet 5 mg, 5 mg, Oral, Q EVENING, Argentina Chase M.D., 5 mg at 02/15/21 2034  •  sucralfate (CARAFATE) 1 GM/10ML suspension 1 g, 1 g, Oral, 4X/DAY ACHS, Argentina Chase M.D., 1 g at 02/16/21 1051  •  acetaminophen (Tylenol) tablet 650 mg, 650 mg, Oral, Q6HRS PRN, Argentina Chase M.D.  •  ondansetron (ZOFRAN) syringe/vial injection 4 mg, 4 mg, Intravenous, Q4HRS PRN, Argentina Chsae M.D., 4 mg at 02/16/21 0647  •  ondansetron (ZOFRAN ODT) dispertab 4 mg, 4 mg, Oral, Q4HRS PRN, Argentina Chase M.D.  •  promethazine (PHENERGAN) tablet 12.5-25 mg, 12.5-25 mg, Oral, Q4HRS PRN, Argentina Chase M.D.  •  promethazine (PHENERGAN) suppository 12.5-25 mg, 12.5-25 mg, Rectal, Q4HRS PRN, Argentina Chase M.D.  •  prochlorperazine (COMPAZINE) injection 5-10 mg, 5-10 mg, Intravenous, Q4HRS PRN, Argentina Chase M.D., 10 mg at 02/15/21 1219  •   "senna-docusate (PERICOLACE or SENOKOT S) 8.6-50 MG per tablet 2 tablet, 2 tablet, Oral, BID, 2 tablet at 21 0852 **AND** polyethylene glycol/lytes (MIRALAX) PACKET 1 Packet, 1 Packet, Oral, QDAY PRN **AND** magnesium hydroxide (MILK OF MAGNESIA) suspension 30 mL, 30 mL, Oral, QDAY PRN **AND** bisacodyl (DULCOLAX) suppository 10 mg, 10 mg, Rectal, QDAY PRN, Argentina Chsae M.D.  •  lactated ringers infusion (BOLUS), 500 mL, Intravenous, Once PRN, Argentina Chase M.D.  •  morphine (pf) 4 mg/mL injection 2-4 mg, 2-4 mg, Intravenous, Q3HRS PRN, Argentina Chase M.D., 4 mg at 21 0646  •  ampicillin/sulbactam (UNASYN) 3 g in  mL IVPB, 3 g, Intravenous, Q24HR, Argentina Chase M.D., Stopped at 21 0223  •  epoetin (Retacrit) injection (Dialysis use only) 10,000 Units, 10,000 Units, Intravenous, MO, WE + FRKarissa M.D., 10,000 Units at 02/15/21 1810  •  epoetin (Retacrit) injection (Dialysis Use Only) 2,000 Units, 2,000 Units, Intravenous, MO, WE + FR, Karissa Verduzco M.D., 2,000 Units at 02/15/21 181  [unfilled]    Most Recent Vital Signs:  /88   Pulse 99   Temp 36.7 °C (98 °F) (Oral)   Resp 16   Ht 1.626 m (5' 4\")   Wt 61.4 kg (135 lb 5.8 oz)   LMP 2021 (Exact Date)   SpO2 98%   BMI 23.23 kg/m²   Temp  Av.9 °C (98.4 °F)  Min: 36.3 °C (97.3 °F)  Max: 38.6 °C (101.5 °F)    Physical Exam:  Physical Exam   Constitutional: She is oriented to person, place, and time and well-developed, well-nourished, and in no distress.   HENT:   Head: Normocephalic and atraumatic.   Left Ear: External ear normal.   Nose: Nose normal.   Eyes: Pupils are equal, round, and reactive to light. Conjunctivae and EOM are normal.   Cardiovascular: Normal rate, regular rhythm and normal heart sounds.   Pulmonary/Chest: Effort normal and breath sounds normal.   Abdominal: Soft. Bowel sounds are normal. She exhibits no distension. There is no abdominal " "tenderness. There is no rebound and no guarding.   Musculoskeletal:         General: Normal range of motion.      Cervical back: Normal range of motion and neck supple.   Neurological: She is alert and oriented to person, place, and time.   Skin: Skin is warm and dry.   Psychiatric: Affect and judgment normal.           Pertinent Lab Results:  Recent Labs     02/15/21  0140 02/16/21  0322   WBC 8.2 4.2*      Recent Labs     02/15/21  0140 02/16/21  0322   HEMOGLOBIN 7.3* 7.7*   HEMATOCRIT 24.4* 26.2*   MCV 89.7 90.3   MCH 26.8* 26.6*   PLATELETCT 92* 88*         Recent Labs     02/15/21  0140 02/16/21  0322   SODIUM 127* 133*   POTASSIUM 4.5 4.7   CHLORIDE 87* 94*   CO2 23 28   CREATININE 8.22* 5.24*        Recent Labs     02/15/21  0140   ALBUMIN 3.3        Pertinent Micro:  Results     Procedure Component Value Units Date/Time    Blood Culture [[id number]] Collected: 02/15/21 0130    Order Status: Completed Specimen: Blood from Peripheral Updated: 02/16/21 0652     Significant Indicator NEG     Source BLD     Site PERIPHERAL     Culture Result No Growth  Note: Blood cultures are incubated for 5 days and  are monitored continuously.Positive blood cultures  are called to the RN and reported as soon as  they are identified.  Blood culture testing and Gram stain, if indicated, are  performed at [facility], [address] [place] [address].  Positive blood cultures are  sent to [facility], [address] [place] [address], for organism identification and  susceptibility testing.      Narrative:      From different peripheral sites, if not done within the last  24 hours (Per Hospital Policy: Only change specimen source to  \"Line\" if specified by physician order)  Left Hand    Blood Culture [[id number]] Collected: 02/15/21 0140    Order Status: Completed Specimen: Blood from Peripheral Updated: 02/16/21 0652     Significant Indicator NEG     Source BLD     Site PERIPHERAL     " "Culture Result No Growth  Note: Blood cultures are incubated for 5 days and  are monitored continuously.Positive blood cultures  are called to the RN and reported as soon as  they are identified.  Blood culture testing and Gram stain, if indicated, are  performed at [facility], [address] [place] [address].  Positive blood cultures are  sent to [facility], [address] [place] [address], for organism identification and  susceptibility testing.      Narrative:      From different peripheral sites, if not done within the last  24 hours (Per Hospital Policy: Only change specimen source to  \"Line\" if specified by physician order)  Left AC    URINALYSIS [[id number]]  (Abnormal) Collected: 02/15/21 1108    Order Status: Completed Updated: 02/15/21 1200     Color Yellow     Character Clear     Specific Gravity 1.015     Ph 8.5     Glucose Negative mg/dL      Ketones Negative mg/dL      Protein 30 mg/dL      Bilirubin Negative     Nitrite Negative     Leukocyte Esterase Negative     Occult Blood Trace     Micro Urine Req Microscopic    COV-2, FLU A/B, AND RSV BY PCR (2-4 HOURS CEPMyrioID): Collect NP swab in [facility] [[id number]] Collected: 02/15/21 0400    Order Status: Completed Specimen: Respirate from Nasopharyngeal Updated: 02/15/21 0446     Influenza virus A RNA Negative     Influenza virus B, PCR Negative     RSV, PCR Negative     SARS-CoV-2 by PCR NotDetected     Comment: PATIENTS: Important information regarding your results and instructions can  be found at [email or web address]   \"After your  Covid-19 Test\"  [facility] providers: PLEASE REFER TO DE-ESCALATION AND RETESTING PROTOCOL  on [email or web address]  **The [employer] GeneXpert Xpress SARS-CoV-2 Test has been made available for  use under the Emergency Use Authorization (EUA) only.          SARS-CoV-2 Source NP Swab    Narrative:      Have you been in close contact with a person who is " suspected  or known to be positive for COVID-19 within the last 30 days  (e.g. last seen that person < 30 days ago)->Unknown    Urinalysis [[id number]]     Order Status: Sent Specimen: Urine         Blood Culture   Date Value Ref Range Status   02/17/2011 No growth after 5 days of incubation.  Final        Studies:  CT-LSPINE W/O PLUS RECONS    Result Date: 2/6/2021 2/6/2021 5:12 PM HISTORY/REASON FOR EXAM: Back pain. TECHNIQUE/EXAM DESCRIPTION AND NUMBER OF VIEWS: CT lumbar spine without contrast, with reconstructions. Thin-section helical images were obtained of the lumbar spine in the axial plane.  Sagittal reconstructions were generated from the axial data. Low dose optimization technique was utilized for this CT exam including automated exposure control and adjustment of the mA and/or kV according to patient size. FINDINGS: Sagittal reconstructed images demonstrate well maintained vertebral body height and alignment. There is no evidence of fracture or dislocation. There is no significant degenerative disk disease. There are minimal disc bulges at L4-5 and L5-S1. No significant central canal or neuroforaminal narrowing. There is bilateral renal cortical thinning.     1.  No evidence of fracture of the lumbar spine. 2.  Minimal disc bulges at L4-5 and L5-S1. No central canal or neural foraminal narrowing.    CT-RENAL COLIC EVALUATION(A/P W/O)    Result Date: 2/5/2021 2/5/2021 3:03 PM HISTORY/REASON FOR EXAM:  LOW BACK PAIN; FEVER; FLANK PAIN. TECHNIQUE/EXAM DESCRIPTION AND NUMBER OF VIEWS: CT scan renal/colic without contrast. 5 mm helical images of the abdomen and pelvis were obtained from the diaphragmatic domes through the pubic symphysis. Low dose optimization technique was utilized for this CT exam including automated exposure control and adjustment of the mA and/or kV according to patient size. COMPARISON: 5/29/2020 FINDINGS: Fluid present in the distal esophagus. Visualized lung bases again show minimal  prominence of interstitium dependently, similar to prior exam. The liver is unremarkable. The spleen is unremarkable. Adrenal glands are unremarkable. Both kidneys show cortical thinning.  Probable small bilateral kidney cysts. The pancreas is unremarkable. Gallbladder is absent. Dilated proximal small bowel and duodenum. Bladder is decompressed. Increased colonic stool. Appendix has a normal appearance. No peritoneal fluid or pneumoperitoneum. Abdominal aorta is unremarkable. No major bony abnormality is seen. LEFT adnexal cystic structure measuring 2.5 cm. RIGHT ovary is not well-visualized. Uterus is grossly unremarkable.     1.  Atrophic kidneys with probable cysts, similar to prior exam.  Mass is not excluded on noncontrast study. 2.  Dilated duodenum and proximal small bowel may indicate early or partial obstruction. 3.  Mildly increased colonic stool. 4.  Normal appendix. 5.  LEFT ovarian cyst versus dominant follicle.    VH-HUWLZBI-2 VIEW    Result Date: 2/7/2021 2/7/2021 3:00 PM HISTORY/REASON FOR EXAM: Abdominal pain with nausea and vomiting. TECHNIQUE/EXAM DESCRIPTION AND NUMBER OF VIEWS: 1 supine views of the abdomen. COMPARISON: None FINDINGS: There is no evidence of bowel obstruction. There is no evidence of free intraperitoneal air. There are surgical clips seen.     No evidence of bowel obstruction.    DX-CHEST-2 VIEWS    Result Date: 2/16/2021 2/16/2021 11:53 AM HISTORY/REASON FOR EXAM:  Shortness of breath and fever. End-stage renal disease TECHNIQUE/EXAM DESCRIPTION AND NUMBER OF VIEWS: Two views of the chest. COMPARISON:  1 view chest 2/15/2021 FINDINGS: LUNGS: The lungs are clear. HEART and MEDIASTINUM: enlarged. A stent projects in the expected position of the right subclavian artery or vein. Pleura: There are no pleural effusion or pneumothoraces. Osseous structures: No significant bony abnormality.     No acute cardiopulmonary abnormality identified.    DX-CHEST-PORTABLE (1 VIEW)    Result  Date: 2/15/2021    2/15/2021 3:05 AM HISTORY/REASON FOR EXAM: Fever TECHNIQUE/EXAM DESCRIPTION:  Single AP view of the chest. COMPARISON: February 5, 2021 FINDINGS: Overlying cardiac leads are present. Cardiomegaly is observed. The mediastinal contour appears within normal limits.  The central pulmonary vasculature appears normal. Bilateral lung volumes are diminished.  Diffuse scattered hazy pulmonary parenchymal opacities are seen. No significant pleural effusions are identified. The bony structures appear age-appropriate.     1.  Hazy pulmonary opacities suggests infiltrates or edema. 2.  Cardiomegaly    DX-CHEST-PORTABLE (1 VIEW)    Result Date: 2/5/2021 2/5/2021 2:11 PM HISTORY/REASON FOR EXAM: Fever and flank pain. TECHNIQUE/EXAM DESCRIPTION AND NUMBER OF VIEWS: Single portable view of the chest. COMPARISON: None FINDINGS: There is no evidence of focal consolidation or evidence of pulmonary edema. There is no pleural effusion. The heart is enlarged. There is a right subclavian vascular stent.     Cardiomegaly.    MR-LUMBAR SPINE-WITH & W/O    Result Date: 2/15/2021  2/15/2021 11:06 AM HISTORY/REASON FOR EXAM:  Back pain or radiculopathy, cancer or infection suspected; suspected. Severe back pain and fever, second admission and unclear source of infection. End-stage renal disease. Fever. Low back pain for 3 days. TECHNIQUE/EXAM DESCRIPTION: MRI of the lumbar spine without and with contrast. The study was performed on a Machine [employer] Signa 1.5 Oumou MRI scanner. T1 sagittal, T2 fast spin-echo sagittal, and T2 axial images were obtained of the lumbar spine. T1 post-contrast fat-suppressed sagittal images were obtained. Optional T2 fat-suppressed sagittal and T1 post-contrast axial images may be obtained. 13 mL ProHance gadolinium contrast was administered intravenously. COMPARISON:  CT of the abdomen and pelvis, renal colic CT 2/5/2021, CT lumbar spine 2/6/2021 FINDINGS: Alignment in the lumbar spine is normal. Marrow  signal in the vertebral bodies is normal. No evidence of vertebral body infarction or osteonecrosis. No evidence of discitis or osteomyelitis. No evidence of pathologic compression fracture. The prevertebral and paraspinous soft tissues are unremarkable.. Incidentally noted, the kidneys are atrophic and there are several renal cortical cysts on the right. The conus is normal in position and signal with its tip at the L2 level. There is no abnormal cord enhancement. There is no abnormal intradural extra medullary enhancement. Disc height and signal is normal at all lumbar levels. At T12-L1 through L5-S1, there is no significant disc bulge or protrusion. There is no central or foraminal stenosis at any lumbar level.     1.  MRI of the lumbar spine without and with contrast within normal limits. 2.  Incidentally noted bilateral renal atrophy in keeping with the given history of end-stage renal disease. Several cortical cysts are noted on the right kidney.    US-EXTREMITY VENOUS LOWER BILAT    Result Date: 2021   Vascular Laboratory  CONCLUSIONS  No superficial or deep venous thrombosis.  CASE REYNOSO  Exam Date:     2021 10:26  Room #:     Inpatient  Priority:     Routine  Ht (in):             Wt (lb):  Ordering Physician:        MODESTO CHARLES  Referring Physician:       [id number] Gonzáles  Sonographer:               Aleyda Gutiérrez RVT  Study Type:                Complete Bilateral  Technical Quality:         Adequate  Age:    25    Gender:     F  MRN:    [id number]  :    1995      BSA:  Indications:     Encounter for screening for cardiovascular disorders  CPT Codes:       53783  ICD Codes:       Z13.6  History:         Recurrent fever, rule out deep venous thrombosis. No prior                   study.  Limitations:  PROCEDURES:  Bilateral lower extremity venous duplex imaging.  The following venous structures were evaluated: common femoral, profunda  femoral, greater saphenous, femoral,  popliteal , peroneal and posterior  tibial veins.  Serial compression, augmentation maneuvers,  color and spectral Doppler  flow evaluations were performed.  FINDINGS:  Bilateral lower extremities -.  No superficial or deep venous thrombosis.  Complete color filling and compressibility with normal venous flow dynamics  including spontaneous flow, response to augmentation maneuvers, and  respiratory phasicity.  Thad Prater MD  (Electronically Signed)  Final Date:      2021                   12:39    EC-ECHOCARDIOGRAM COMPLETE W/O CONT    Result Date: 2/15/2021  Transthoracic Echo Report Echocardiography Laboratory CONCLUSIONS Left ventricular ejection fraction is visually estimated to be 55%. Estimated right ventricular systolic pressure  is 40 mmHg mildly elevated right-sided pressures. Compared to prior Echo - 20, no significant change. CASE REYNOSO Exam Date:         02/15/2021                    07:21 Exam Location:     Inpatient Priority:          Routine Ordering Physician:        JOHN HOLLINS Referring Physician: Sonographer:               Vaishnavi MAYEN Age:    25     Gender:    Fema                           le MRN:    [id number] :    1995 BSA:    1.66   Ht (in):    64     Wt (lb):    135 Exam Type:     Complete Indications:     Fever, unknown origin ICD Codes:       780.6 CPT Codes:       99698 BP:   164    /   100    HR:   115 Technical Quality:       Fair MEASUREMENTS  (Male / Female) Normal Values 2D ECHO LV Diastolic Diameter PLAX        5.2 cm                4.2 - 5.9 / 3.9 - 5.3 cm LV Systolic Diameter PLAX         4 cm                  2.1 - 4.0 cm IVS Diastolic Thickness           0.91 cm               LVPW Diastolic Thickness          0.96 cm               LVOT Diameter                     1.8 cm                Estimated LV Ejection Fraction    55 %                  LV Ejection Fraction  MOD BP       54 %                  >= 55  % LV Ejection Fraction MOD 4C       55.4 %                LV Ejection Fraction MOD 2C       50.7 %                IVC Diameter                      1.5 cm                DOPPLER AV Peak Velocity                  1.7 m/s               AV Peak Gradient                  11.3 mmHg             AV Mean Gradient                  7.6 mmHg              LVOT Peak Velocity                1.4 m/s               AV Area Cont Eq vti               2.1 cm2               TR Peak Velocity                  306 cm/s              PV Peak Velocity                  1.1 m/s               PV Peak Gradient                  5.1 mmHg              RVOT Peak Velocity                0.86 m/s              * Indicates values subject to auto-interpretation LV EF:  55    % FINDINGS Left Ventricle Normal left ventricular chamber size. Normal left ventricular wall thickness. Left ventricular systolic function is low normal. Left ventricular ejection fraction is visually estimated to be 55%. Normal regional wall motion. Grade II diastolic dysfunction. Right Ventricle The right ventricle was normal in size and function. Right Atrium The right atrium is normal in size.  Normal inferior vena cava size and inspiratory collapse. Left Atrium Moderately dilated left atrium. Left atrial volume index is 43 mL/sq m. Mitral Valve Structurally normal mitral valve without significant stenosis. Mild mitral regurgitation. Aortic Valve Tricuspid aortic valve. No stenosis or regurgitation seen. Tricuspid Valve Structurally normal tricuspid valve without significant stenosis. Mild tricuspid regurgitation. Estimated right ventricular systolic pressure  is 40 mmHg. Right atrial pressure is estimated to be 3 mmHg. Pulmonic Valve The pulmonic valve is not well visualized. No pulmonic stenosis. Trace pulmonic insufficiency. Pericardium Normal pericardium without effusion. Aorta The aortic root is normal.  Ascending aorta diameter is  3.5 cm. Poonam Arnold MD (Electronically Signed) Final Date:     15 February 2021                 11:16    DX-HIP-BILATERAL-WITH PELVIS-3/4 VIEWS    Result Date: 2/16/2021 2/16/2021 11:53 AM HISTORY/REASON FOR EXAM:  Atraumatic Pelvic/Hip Pain. Low back and bilateral hip pain TECHNIQUE/EXAM DESCRIPTION AND NUMBER OF VIEWS:  3 views of the right and left hip. COMPARISON: CT abdomen and pelvis 2/5/2021 FINDINGS: BONY PELVIS: normal in appearance. The left femoral head is sclerotic highly suspicious for avascular necrosis. Right femoral head appears normal. HIPS: Normal in appearance. There are no fracture or malalignment. Sacroiliac joints:Normal in appearance. LUMBAR spine: Normal in appearance. ABDOMEN: Single surgical clip in the right lower quadrant.    Left femoral head sclerosis highly suspicious for avascular necrosis      ASSESSMENT/PLAN:     25 y.o.  admitted 2/15/2021. Pt has a past medical history of SLE and lupus nephritis with end-stage renal disease and on hemodialysis.  She is immune suppressed on prednisone 5 mg daily, mycophenolate and Plaquenil. She was recently admitted with fevers elevated procalcitonin and underwent work-up which was negative for infection.  She was given ceftriaxone and then discharged with oral antibiotics for additional 5 days.  She now returns complaining of new onset back pain and fevers at home.  She is from [place] and moved here when she was 8 and has lived in the [facility] ever since.    Hospital Course:   Fevers in the ER to 101.5 and tachycardia, now improved.  WBC of 8.2 likely represents leukocytosis given her chronic low levels.  Blood cultures were obtained and she was started on Unasyn.  This x-ray unremarkable MRI spine unremarkable.  Ultrasound of bilateral lower extremities with no DVT.    Problem List     Fevers, unclear etiology, highly suspicious avascular necrosis in the hip but unclear if this can cause fevers, pt with SLE and immunosuppressed so will  work-up broadly  -Blood cultures so far no growth today  -TTE unremarkable with no vegetations, significant valvular disease and EF 55%  Back pain  -MRI lumbar spine is unremarkable other than incidentally noted bilateral renal atrophy  Hip pain  -Plain film of the hips left femoral head sclerosis highly suspicious for avascular necrosis  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  Immunosuppressed - secondary to above on prednisone 5 mg daily, mycophenolate and Plaquenil (hydroxychloroquine)  Leukopenia, chronic  Anemia, chronic  Thrombocytopenia, chronic  -Pancytopenia is likely multifactorial secondary to SLE as well as end-stage renal disease, can also be seen with mycophenolate and hydroxychloroquine  Diagnosed with hemorrhagic gastritis in 1/21    Plan     --- Continue Unasyn for now as the patient appears to be responding  --- Obtain additional imaging as needed to clarify whether she has ongoing avascular necrosis-likely need MRI   --- Follow-up blood cultures  --- CT chest, abdomen/pelvis to rule out infectious etiologies of her fever  --- Repeat procalcitonin for a.m.   --- Ordered CMV quant, TB quant, parvovirus (given her pancytopenia), HCV PCR, and HIV  --- Recommend follow-up with her rheumatologist on discharge        Plan of care discussed with BELINDA Zapata M.D.. Will continue to follow    Demetra Aguilar M.D.

## 2021-02-16 NOTE — PROGRESS NOTES
[facility] Progress Note     Hemodialysis treatment ordered today per Dr. Metz x 3 hours.   Treatment initiated at 0700 ended at 1000.      Patient tolerated tx; see paper flow sheet for details.      Net UF 2,000 mL.      Needles removed from access site. Dressings applied and sites held x 10 minutes; verified no bleeding. Positive bruit/thrill post tx. Staff RN to monitor AVF/G for breakthrough bleeding. Should breakthrough bleeding occur, staff RN to apply pressure to access sites until bleeding resolved. Notify Dialysis and Nephrologist for follow-up.     Report given to Primary RN.

## 2021-02-16 NOTE — CARE PLAN
Problem: Communication  Goal: The ability to communicate needs accurately and effectively will improve  Outcome: PROGRESSING AS EXPECTED     Problem: Safety  Goal: Will remain free from injury  Outcome: PROGRESSING AS EXPECTED  Goal: Will remain free from falls  Outcome: PROGRESSING AS EXPECTED     Problem: Venous Thromboembolism (VTW)/Deep Vein Thrombosis (DVT) Prevention:  Goal: Patient will participate in Venous Thrombosis (VTE)/Deep Vein Thrombosis (DVT)Prevention Measures  Outcome: PROGRESSING AS EXPECTED     Problem: Bowel/Gastric:  Goal: Normal bowel function is maintained or improved  Outcome: PROGRESSING AS EXPECTED     Problem: Pain Management  Goal: Pain level will decrease to patient's comfort goal  Outcome: PROGRESSING AS EXPECTED

## 2021-02-16 NOTE — CARE PLAN
Received report from day shift nurse.   Assumed pt care   Pt is A&Ox4, resting comfortably in bed.   Pt on 2 liters nasal canula for comfort. No signs of SOB/respiratory distress.   Labs noted, VSS.   Pt c/o 7/10 pain at this moment, medicated per MAR.    Assessment completed, pt receiving dialysis and tolerating well. Complains of some nausea, medicated per MAR.   Fall precautions in place.   Bed at lowest position.   Call light and personal belongings within reach. Continue to monitor         Problem: Knowledge Deficit  Goal: Knowledge of the prescribed therapeutic regimen will improve  Outcome: PROGRESSING AS EXPECTED     Problem: Fluid Volume:  Goal: Will maintain balanced intake and output  Outcome: PROGRESSING AS EXPECTED

## 2021-02-16 NOTE — PROGRESS NOTES
[facility] Progress Note     Hemodialysis treatment x 3 hours performed per routine order by Dr. Metz  Treatment started at 1606 and ended at 1907.      Patient tolerated treatment well. Patient stable during and post treatment, no issues. See Acute HD flow sheets for details.     Total Net UF Removed:  3,000 mL (see Dialysis I & O Flowsheet)     Post treatment: Cannulation needles removed from RUE AVF access sites, hemostasis established on each insertion site; verified no bleeding. (+) bruit/thrill post treatment. Covered with clean gauze dressings and secured with tape.     Please monitor for AVF access bleeding.  Notify Dialysis and Nephrologist for follow-up.     Report given to primary care nurse BRIANNA Oliveira RN.

## 2021-02-16 NOTE — ASSESSMENT & PLAN NOTE
- Hospitalized in January for UGIB  - Continue Omeprazole and carafate  - Check Hgb in the am - she does get frequent transfusions, follows with Dr Garcia.  Trying to limit transfusions due to antibodies as pt on the transplant list

## 2021-02-16 NOTE — PROGRESS NOTES
Telemetry Shift Summary    Rhythm SR/ST  HR Range   Ectopy -  Measurements 0.16/0.08/0.36        Normal Values  Rhythm SR  HR Range    Measurements 0.12-0.20 / 0.06-0.10  / 0.30-0.52

## 2021-02-16 NOTE — PROCEDURES
Diagnosis: End-Stage Renal Disease admitted with back pain, had MRI with contrast yesterday. Patient seen and examined on hemodialysis during treatment. Patient is stable, tolerating hemodialysis. Denies chest pain and shortness of breath. Orders updated as needed. Please refer to flowsheet for full details.    Access: Right brachiocephalic AV fistula  UF goal: 2 L    Plan: Extra dialysis today for gadolinium clearance.  Plan for dialysis again tomorrow per usual Monday Wednesday Friday schedule.  Okay to discharge after dialysis tomorrow from nephrology standpoint.    Navin Metz MD  Nephrology

## 2021-02-16 NOTE — PROGRESS NOTES
Hospital Medicine Daily Progress Note    Date of Service  2/16/2021    Chief Complaint  25 y.o. female admitted 2/15/2021 with fever and back pain    Hospital Course  25 y.o. female, with history of end-stage renal disease/nephritis due to lupus on hemodialysis Monday, Wednesday, and Friday (Dr. Trent), recently admitted to this hospital from 2/5/2021 discharged on 2/8/2021 with high fevers, elevated procalcitonin, CRP.  Work-up was negative for clear source of infection.  She had negative blood cultures, negative urine cultures cardiomegaly but no pneumonia.  She is mainly complaining of a new onset of back pain, mostly on her lumbar spine since the beginning of February therefore a CT scan of the lumbar spine was also done.  ID, Dr. Mary was following.  She was on Rocephin here in the ED and completed a 5-day course of empiric antibiotic with significant improvement.  Discharged on cefdinir that she just completed course 5 days ago.  She was told to come back to the ER if having any fever and comes today on 2/15/2021 reporting temperature of 101 at home.    Interval Problem Update  2/16 - No fever since 0436 yesterday morning, no tachycardia. Discussed at length with patient - she specifically denies the following: Ear pain, sinus pain or headache, sore throat, cough or SOB, abdominal pain, diarrhea, skin lesions or breakdowns, dysuria, joint pain other than hips. She denies a history of fevers with her lupus. She denies any sick contacts, has a pet [facility], denies any travel. Only symptom is pain in her bilateral hip (not midline spinal pain), no trauma.     Consultants/Specialty  Nephrology  ID    Code Status  Full Code    Disposition  TBD     Review of Systems  Review of Systems   Constitutional: Positive for fever. Negative for chills.   HENT: Negative for congestion, ear pain, sinus pain and sore throat.    Respiratory: Negative for cough and shortness of breath.    Cardiovascular: Negative for chest pain  and leg swelling.   Gastrointestinal: Negative for abdominal pain, diarrhea, nausea and vomiting.   Genitourinary: Negative for dysuria.   Musculoskeletal: Positive for joint pain (Bilateral hips ).   Skin: Negative for rash.   Neurological: Negative for headaches.   All other systems reviewed and are negative.       Physical Exam  Temp:  [36.4 °C (97.6 °F)-37.4 °C (99.3 °F)] 36.5 °C (97.7 °F)  Pulse:  [] 70  Resp:  [16-20] 16  BP: (102-143)/(63-98) 102/63  SpO2:  [93 %-100 %] 93 %    Physical Exam  Vitals and nursing note reviewed.   Constitutional:       General: She is not in acute distress.     Appearance: Normal appearance. She is not toxic-appearing. Ill appearance: Chronically ill appearing.   HENT:      Head: Normocephalic and atraumatic.      Nose: Nose normal.      Mouth/Throat:      Mouth: Mucous membranes are moist.      Pharynx: Oropharynx is clear. No oropharyngeal exudate or posterior oropharyngeal erythema.   Eyes:      General:         Right eye: No discharge.         Left eye: No discharge.      Extraocular Movements: Extraocular movements intact.      Conjunctiva/sclera: Conjunctivae normal.   Cardiovascular:      Rate and Rhythm: Normal rate and regular rhythm.   Pulmonary:      Effort: Pulmonary effort is normal. No respiratory distress.      Breath sounds: Normal breath sounds. No wheezing, rhonchi or rales.   Abdominal:      General: Abdomen is flat. Bowel sounds are normal. There is no distension.      Palpations: Abdomen is soft.      Tenderness: There is no abdominal tenderness. There is no guarding or rebound.   Musculoskeletal:         General: Tenderness (Tenderness over bilateral gluteus tania insertion points) present. No swelling. Normal range of motion.      Cervical back: Normal range of motion. No rigidity.   Skin:     General: Skin is warm.      Findings: No erythema, lesion or rash.      Comments: Striae to posterior back   Neurological:      General: No focal deficit  present.      Mental Status: She is alert and oriented to person, place, and time.   Psychiatric:         Behavior: Behavior normal.         Fluids    Intake/Output Summary (Last 24 hours) at 2/16/2021 0909  Last data filed at 2/15/2021 2300  Gross per 24 hour   Intake 860 ml   Output 3500 ml   Net -2640 ml       Laboratory  Recent Labs     02/15/21  0140 02/16/21  0322   WBC 8.2 4.2*   RBC 2.72* 2.90*   HEMOGLOBIN 7.3* 7.7*   HEMATOCRIT 24.4* 26.2*   MCV 89.7 90.3   MCH 26.8* 26.6*   MCHC 29.9* 29.4*   RDW 59.2* 58.4*   PLATELETCT 92* 88*   MPV 11.3 11.8     Recent Labs     02/15/21  0140 02/16/21  0322   SODIUM 127* 133*   POTASSIUM 4.5 4.7   CHLORIDE 87* 94*   CO2 23 28   GLUCOSE 94 92   BUN 46* 25*   CREATININE 8.22* 5.24*   CALCIUM 8.7 8.9     Recent Labs     02/15/21  0140   INR 1.11               Imaging  MR-LUMBAR SPINE-WITH & W/O   Final Result      1.  MRI of the lumbar spine without and with contrast within normal limits.   2.  Incidentally noted bilateral renal atrophy in keeping with the given history of end-stage renal disease. Several cortical cysts are noted on the right kidney.      EC-ECHOCARDIOGRAM COMPLETE W/O CONT   Final Result      DX-CHEST-PORTABLE (1 VIEW)   Final Result         1.  Hazy pulmonary opacities suggests infiltrates or edema.   2.  Cardiomegaly      DX-HIP-BILATERAL-WITH PELVIS-3/4 VIEWS    (Results Pending)   DX-CHEST-2 VIEWS    (Results Pending)   US-EXTREMITY VENOUS LOWER BILAT    (Results Pending)        Assessment/Plan  * SIRS (systemic inflammatory response syndrome) (HCC)  Assessment & Plan  - SIRS criteria identified on my evaluation include:  Fever, with temperature greater than 101 deg F and tachycardic with heart rate above 120 bpm.  Noticed that patient had a recent prior admission with fever of unclear etiology from 2/5/2021 2 2/8/2021.   - Fevers and tachycardia have resolved, continue Unasyn  - Thus far, unclear etiology; MRI spine negative, CXR with edema (will  repeat today now that she has dialyzed), UA negative, COVID negative, no longer has a gallbladder  - Only symptom is back pain, no evidence of discitis or osteo on imaging  - No positive blood cultures or warmth of AVF to suggest infection  - Had CT with possible SBO last stay, no diarrhea or constipation per patient  - Will check dopplers to rule out non infectious causes of fever, though procalcitonin is elevated at 1.46  - Will consult Dr Aguilar as pt is immunosuppressed with lupus and treatment.  - Cortisol level normal last hospitalization  - Check plain film of the hips as this is her only symptom   - Blood cultures NTD       Hemorrhagic gastritis with hematemesis - (present on admission)  Assessment & Plan  - Hospitalized in January for UGIB  - Continue Omeprazole      Acute pulmonary edema (HCC)- (present on admission)  Assessment & Plan  -Seen on chest x-ray, possible underlying infiltrates is not fully excluded at this time.  -Consider repeating chest x-ray after completing hemodialysis session for clarification.    Lupus (HCC)- (present on admission)  Assessment & Plan  Continue mycophenolate, Plaquenil and prednisone.  Most recent lupus flare was in December 2020.    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  Appreciate Nephrology. Dialyzed today for gadolinium yesterday, to resume MWF schedule tomorrow     Pancytopenia (HCC)- (present on admission)  Assessment & Plan  - Chronic, secondary to her lupus and treatment  - Stable from previous       VTE prophylaxis: SCDs

## 2021-02-16 NOTE — PROGRESS NOTES
Telemetry Shift Summary    Rhythm SR  HR Range   Ectopy none  Measurements .16/.08/.36        Normal Values  Rhythm SR  HR Range    Measurements 0.12-0.20 / 0.06-0.10  / 0.30-0.52

## 2021-02-16 NOTE — CONSULTS
"Nephrology Consultation    Date of Service  2/15/2021    Referring Physician  Karissa Verduzco M.D.    Consulting Physician  Navin Metz M.D.    Reason for Consultation  Management of ESRD on HD      History of Presenting Illness  25 y.o. female with ESRD on hemodialysis Monday Wednesday Friday who presented 2/15/2021 with back pain.    She says this is the same back pain that she was recently in the hospital for.  The back pain was so bad that she came back to the hospital.  Patient had an MRI of her spine, with no significant abnormalities.    Regarding her ESRD, she has been on dialysis for 6 years.  Etiology of her ESRD is lupus.  The patient is oligo-anuric.  The patient is listed for transplant at [facility].  The patient usually attends dialysis at [facility] on Monday Wednesday Friday, under the care of my partner Dr. Trent.    The patient is seen and examined on dialysis, which she is tolerating well.  Denies chest pain, shortness of breath on dialysis.    Review of Systems  Review of Systems   Constitutional: Negative for fever.   Respiratory: Negative for shortness of breath.    Cardiovascular: Negative for chest pain.   Gastrointestinal: Negative for abdominal pain.   Musculoskeletal: Positive for back pain.   All other systems reviewed and are negative.      Past Medical History  Past Medical History:   Diagnosis Date   • Anemia 01/17/2018   • AVF (arteriovenous fistula) ([facility])     Right Arm   • Dialysis patient (HCC)      dialysis, M,W,F [facility]   • ESRD (end stage renal disease) on dialysis ([facility]) 01/17/2018    Twan Fu   • Heart burn    • Hypertension 01/17/2018    \"Controlled with medication\"   • Indigestion    • Lupus ([facility])    • Migraines 01/17/2018   • Seizure ([facility]) 2013    from high blood pressure, reports 1 time event       Surgical History  Past Surgical History:   Procedure Laterality Date   • PB COLONOSCOPY,DIAGNOSTIC  1/8/2021    Procedure: COLONOSCOPY;  Surgeon: Herbert Contreras M.D.;  " "Location: [facility];  Service: Gastroenterology   • PB UPPER GI ENDOSCOPY,DIAGNOSIS  1/8/2021    Procedure: GASTROSCOPY;  Surgeon: Herbert Contreras M.D.;  Location: [facility];  Service: Gastroenterology   • PB UPPER GI ENDOSCOPY,CTRL BLEED  1/8/2021    Procedure: GASTROSCOPY, WITH ARGON PLASMA COAGULATION;  Surgeon: Herbert Contreras M.D.;  Location: [facility];  Service: Gastroenterology   • PB UPPER GI ENDOSCOPY,CTRL BLEED  11/12/2020    Procedure: GASTROSCOPY, WITH ARGON PLASMA COAGULATION;  Surgeon: Gadiel Whitney M.D.;  Location: [facility];  Service: Gastroenterology   • PB UPPER GI ENDOSCOPY,BIOPSY  11/12/2020    Procedure: GASTROSCOPY, WITH BIOPSY;  Surgeon: Gadiel Whitney M.D.;  Location: [facility];  Service: Gastroenterology   • GASTROSCOPY-ENDO  11/12/2020    Procedure: GASTROSCOPY;  Surgeon: Gadiel Whitney M.D.;  Location: [facility];  Service: Gastroenterology   • GASTROSCOPY-ENDO  9/18/2020    Procedure: GASTROSCOPY;  Surgeon: Gadiel Whitney M.D.;  Location: [facility];  Service: Gastroenterology   • GASTROSCOPY N/A 5/30/2020    Procedure: GASTROSCOPY;  Surgeon: Waylon Mcqueen M.D.;  Location: [facility];  Service: Gastroenterology   • GASTROSCOPY-ENDO  12/9/2019    Procedure: GASTROSCOPY;  Surgeon: Aaron Kam M.D.;  Location: [facility];  Service: Gastroenterology   • ANGIOPLASTY  01/17/2018    \"Right Arm AV-Fistulagram & Angioplastyx3\"   • ULI BY LAPAROSCOPY  4/5/2010    Performed by SYED MARTELL at [facility]   • AV FISTULA CREATION Right    • OTHER      renal biopsy x 3   • OTHER      bone marrow biopsy       Family History  Family History   Problem Relation Age of Onset   • Diabetes Paternal Grandmother        Social History  Social History     Socioeconomic History   • Marital status: Single     Spouse name: Not on file   • Number of children: Not on file   • Years of " 26-Dec-2017 education: Not on file   • Highest education level: Not on file   Occupational History   • Not on file   Tobacco Use   • Smoking status: Never Smoker   • Smokeless tobacco: Never Used   Substance and Sexual Activity   • Alcohol use: No   • Drug use: No   • Sexual activity: Not on file   Other Topics Concern   • Not on file   Social History Narrative   • Not on file     Social Determinants of Health     Financial Resource Strain:    • Difficulty of Paying Living Expenses:    Food Insecurity: No Food Insecurity   • Worried About Running Out of Food in the Last Year: Never true   • Ran Out of Food in the Last Year: Never true   Transportation Needs: No Transportation Needs   • Lack of Transportation (Medical): No   • Lack of Transportation (Non-Medical): No   Physical Activity:    • Days of Exercise per Week:    • Minutes of Exercise per Session:    Stress:    • Feeling of Stress :    Social Connections:    • Frequency of Communication with Friends and Family:    • Frequency of Social Gatherings with Friends and Family:    • Attends Christian Services:    • Active Member of Clubs or Organizations:    • Attends Club or Organization Meetings:    • Marital Status:    Intimate Partner Violence:    • Fear of Current or Ex-Partner:    • Emotionally Abused:    • Physically Abused:    • Sexually Abused:        Medications  Current Facility-Administered Medications   Medication Dose Route Frequency Provider Last Rate Last Admin   • lactated ringers infusion (BOLUS): BMI less than or equal to 30  30 mL/kg Intravenous Once PRN Alix Huntley D.O.       • amLODIPine (NORVASC) tablet 10 mg  10 mg Oral Q EVENING Argentina Chase M.D.       • atenolol (TENORMIN) tablet 25 mg  25 mg Oral Q EVENING Argentina Chase M.D.       • hydroxychloroquine (Plaquenil) tablet 200 mg  200 mg Oral Q EVENING Argentina Chase M.D.       • predniSONE (DELTASONE) tablet 5 mg  5 mg Oral Q EVENING Argentina Chase M.D.       •  sucralfate (CARAFATE) 1 GM/10ML suspension 1 g  1 g Oral 4X/DAY ACHS Argentina Chase M.D.   1 g at 02/15/21 1048   • acetaminophen (Tylenol) tablet 650 mg  650 mg Oral Q6HRS PRN Argentina Chase M.D.       • ondansetron (ZOFRAN) syringe/vial injection 4 mg  4 mg Intravenous Q4HRS PRN Argentina Chase M.D.   4 mg at 02/15/21 1047   • ondansetron (ZOFRAN ODT) dispertab 4 mg  4 mg Oral Q4HRS PRN Argentina Chase M.D.       • promethazine (PHENERGAN) tablet 12.5-25 mg  12.5-25 mg Oral Q4HRS GLENROY Chase M.D.       • promethazine (PHENERGAN) suppository 12.5-25 mg  12.5-25 mg Rectal Q4HRS PRN Argentina Chase M.D.       • prochlorperazine (COMPAZINE) injection 5-10 mg  5-10 mg Intravenous Q4HRS PRRIP Chase M.D.   10 mg at 02/15/21 1219   • senna-docusate (PERICOLACE or SENOKOT S) 8.6-50 MG per tablet 2 tablet  2 tablet Oral BID Argentina Chase M.D.        And   • polyethylene glycol/lytes (MIRALAX) PACKET 1 Packet  1 Packet Oral QDAY PRN Argentina Chase M.D.        And   • magnesium hydroxide (MILK OF MAGNESIA) suspension 30 mL  30 mL Oral QDAY PRN Argentina Chase M.D.        And   • bisacodyl (DULCOLAX) suppository 10 mg  10 mg Rectal QDAY PRN Argentina Chase M.D.       • lactated ringers infusion (BOLUS)  500 mL Intravenous Once PRN Argentina Chase M.D.       • morphine (pf) 4 mg/mL injection 2-4 mg  2-4 mg Intravenous Q3HRS PRN Argentina Chase M.D.   4 mg at 02/15/21 1219   • [START ON 2/16/2021] ampicillin/sulbactam (UNASYN) 3 g in  mL IVPB  3 g Intravenous Q24HR Argentina Chase M.D.           Allergies  Allergies   Allergen Reactions   • Cephalexin Rash     .   • Clindamycin Rash     .   • Methylprednisolone Unspecified     Anxious   • Metoprolol Rash     .       Physical Exam  Temp:  [36.4 °C (97.6 °F)-38.6 °C (101.5 °F)] 36.4 °C (97.6 °F)  Pulse:  [] 76  Resp:  [16-38] 16  BP:  (131-184)/() 135/87  SpO2:  [87 %-100 %] 100 %    Physical Exam   Constitutional: She is oriented to person, place, and time. She appears well-developed. No distress.   HENT:   Mouth/Throat: Oropharynx is clear and moist. No oropharyngeal exudate.   Eyes: EOM are normal. No scleral icterus.   Neck: No tracheal deviation present.   Cardiovascular: Normal heart sounds.   No murmur heard.  Pulmonary/Chest: Effort normal. No stridor. She has no rales.   Abdominal: Soft. There is no abdominal tenderness.   Musculoskeletal:         General: No edema. Normal range of motion.   Neurological: She is alert and oriented to person, place, and time.   Skin: Skin is warm and dry.   Psychiatric: She has a normal mood and affect.   Access: Right brachiocephalic AV fistula, accessed with needles.      Fluids  Date 02/15/21 0700 - 02/16/21 0659   Shift 8281-0590 4731-0465 7266-4298 24 Hour Total   INTAKE   P.O. 120   120   Shift Total 120   120   OUTPUT   Shift Total       Weight (kg) 61.4 61.4 61.4 61.4       Laboratory  Labs reviewed, pertinent labs below.  Recent Labs     02/15/21  0140   WBC 8.2   RBC 2.72*   HEMOGLOBIN 7.3*   HEMATOCRIT 24.4*   MCV 89.7   MCH 26.8*   MCHC 29.9*   RDW 59.2*   PLATELETCT 92*   MPV 11.3     Recent Labs     02/15/21  0140   SODIUM 127*   POTASSIUM 4.5   CHLORIDE 87*   CO2 23   GLUCOSE 94   BUN 46*   CREATININE 8.22*   CALCIUM 8.7     Recent Labs     02/15/21  0140   INR 1.11            URINALYSIS:  Lab Results   Component Value Date/Time    COLORURINE Yellow 02/15/2021 1108    CLARITY Clear 02/15/2021 1108    SPECGRAVITY 1.015 02/15/2021 1108    PHURINE 8.5 (A) 02/15/2021 1108    KETONES Negative 02/15/2021 1108    PROTEINURIN 30 (A) 02/15/2021 1108    BILIRUBINUR Negative 02/15/2021 1108    UROBILU 0.2 07/16/2020 0900    NITRITE Negative 02/15/2021 1108    LEUKESTERAS Negative 02/15/2021 1108    OCCULTBLOOD Trace (A) 02/15/2021 1108     [facility]  [place]   Component Value Date/Time     TOTPROTUR 45.0 (H) 07/16/2020 0900      Lab Results   Component Value Date/Time    CREATININEU 18.93 07/16/2020 0900       Imaging reviewed  MR-LUMBAR SPINE-WITH & W/O   Final Result      1.  MRI of the lumbar spine without and with contrast within normal limits.   2.  Incidentally noted bilateral renal atrophy in keeping with the given history of end-stage renal disease. Several cortical cysts are noted on the right kidney.      EC-ECHOCARDIOGRAM COMPLETE W/O CONT   Final Result      DX-CHEST-PORTABLE (1 VIEW)   Final Result         1.  Hazy pulmonary opacities suggests infiltrates or edema.   2.  Cardiomegaly            Assessment/Plan  25 y.o. female with ESRD on hemodialysis Monday Wednesday Friday who presented 2/15/2021 with back pain.    1.  ESRD on hemodialysis Monday Wednesday Friday.  Oligoanuric.  Dialysis today per usual schedule.  Given recent MRI with gadolinium, will plan on dialysis for 3 days in a row, today, tomorrow, and Wednesday.  Avoid nephrotoxins.  Check labs daily.    2.  Access: Right brachiocephalic AV fistula, patent.  Right arm precautions.    3.  Back pain, reason for admission, etiology unclear.  Further work-up and management per primary team.    4.  Anemia of chronic disease, order Epogen thrice weekly with dialysis.  Patient was on 12,000 units of Epogen thrice weekly with dialysis as an outpatient, will continue.  Check CBC daily.    5.  Thrombocytopenia, noted on admission.  Unclear etiology.  Check CBC daily.    6.  Hyponatremia, noted on admission.  Likely due to excess fluid intake.  Limit hypotonic fluids.  Restrict fluids to 1 L daily.  Check labs daily.    Following      Navin Metz MD  Nephrology

## 2021-02-16 NOTE — CARE PLAN
Problem: Communication  Goal: The ability to communicate needs accurately and effectively will improve  Outcome: PROGRESSING AS EXPECTED     Problem: Safety  Goal: Will remain free from falls  Outcome: PROGRESSING AS EXPECTED     Problem: Infection  Goal: Will remain free from infection  Outcome: PROGRESSING AS EXPECTED     Problem: Bowel/Gastric:  Goal: Normal bowel function is maintained or improved  Outcome: PROGRESSING AS EXPECTED     Problem: Pain Management  Goal: Pain level will decrease to patient's comfort goal  Outcome: PROGRESSING AS EXPECTED     Problem: Fluid Volume:  Goal: Will maintain balanced intake and output  Outcome: PROGRESSING AS EXPECTED

## 2021-02-17 ENCOUNTER — APPOINTMENT (OUTPATIENT)
Dept: RADIOLOGY | Facility: MEDICAL CENTER | Age: 26
DRG: 864 | End: 2021-02-17
Attending: FAMILY MEDICINE
Payer: COMMERCIAL

## 2021-02-17 PROBLEM — F41.8 SITUATIONAL ANXIETY: Status: ACTIVE | Noted: 2021-02-17

## 2021-02-17 PROBLEM — M87.00 AVASCULAR NECROSIS (HCC): Status: ACTIVE | Noted: 2021-02-17

## 2021-02-17 PROBLEM — J81.0 ACUTE PULMONARY EDEMA (HCC): Status: RESOLVED | Noted: 2020-12-02 | Resolved: 2021-02-17

## 2021-02-17 LAB
ALBUMIN SERPL BCP-MCNC: 2.9 G/DL (ref 3.2–4.9)
ALBUMIN/GLOB SERPL: 0.5 G/DL
ALP SERPL-CCNC: 67 U/L (ref 30–99)
ALT SERPL-CCNC: 10 U/L (ref 2–50)
ANION GAP SERPL CALC-SCNC: 12 MMOL/L (ref 7–16)
AST SERPL-CCNC: 17 U/L (ref 12–45)
BASOPHILS # BLD AUTO: 0.8 % (ref 0–1.8)
BASOPHILS # BLD: 0.03 K/UL (ref 0–0.12)
BILIRUB SERPL-MCNC: 0.3 MG/DL (ref 0.1–1.5)
BUN SERPL-MCNC: 21 MG/DL (ref 8–22)
C3 SERPL-MCNC: 109.8 MG/DL (ref 87–200)
C4 SERPL-MCNC: 12.8 MG/DL (ref 19–52)
CALCIUM SERPL-MCNC: 8.9 MG/DL (ref 8.4–10.2)
CHLORIDE SERPL-SCNC: 94 MMOL/L (ref 96–112)
CO2 SERPL-SCNC: 26 MMOL/L (ref 20–33)
COMMENT 1642: NORMAL
CREAT SERPL-MCNC: 4.52 MG/DL (ref 0.5–1.4)
EOSINOPHIL # BLD AUTO: 0.02 K/UL (ref 0–0.51)
EOSINOPHIL NFR BLD: 0.5 % (ref 0–6.9)
ERYTHROCYTE [DISTWIDTH] IN BLOOD BY AUTOMATED COUNT: 61.9 FL (ref 35.9–50)
GLOBULIN SER CALC-MCNC: 5.3 G/DL (ref 1.9–3.5)
GLUCOSE SERPL-MCNC: 98 MG/DL (ref 65–99)
HCT VFR BLD AUTO: 25.6 % (ref 37–47)
HGB BLD-MCNC: 7.1 G/DL (ref 12–16)
HIV 1+2 AB+HIV1 P24 AG SERPL QL IA: NORMAL
HYPOCHROMIA BLD QL SMEAR: ABNORMAL
IMM GRANULOCYTES # BLD AUTO: 0.01 K/UL (ref 0–0.11)
IMM GRANULOCYTES NFR BLD AUTO: 0.3 % (ref 0–0.9)
LYMPHOCYTES # BLD AUTO: 0.37 K/UL (ref 1–4.8)
LYMPHOCYTES NFR BLD: 10.1 % (ref 22–41)
MCH RBC QN AUTO: 26.1 PG (ref 27–33)
MCHC RBC AUTO-ENTMCNC: 27.7 G/DL (ref 33.6–35)
MCV RBC AUTO: 94.1 FL (ref 81.4–97.8)
MONOCYTES # BLD AUTO: 0.31 K/UL (ref 0–0.85)
MONOCYTES NFR BLD AUTO: 8.5 % (ref 0–13.4)
NEUTROPHILS # BLD AUTO: 2.92 K/UL (ref 2–7.15)
NEUTROPHILS NFR BLD: 79.8 % (ref 44–72)
NRBC # BLD AUTO: 0 K/UL
NRBC BLD-RTO: 0 /100 WBC
PLATELET # BLD AUTO: 96 K/UL (ref 164–446)
PLATELET BLD QL SMEAR: NORMAL
PMV BLD AUTO: 12.2 FL (ref 9–12.9)
POLYCHROMASIA BLD QL SMEAR: NORMAL
POTASSIUM SERPL-SCNC: 4.7 MMOL/L (ref 3.6–5.5)
PROCALCITONIN SERPL-MCNC: 2.04 NG/ML
PROT SERPL-MCNC: 8.2 G/DL (ref 6–8.2)
RBC # BLD AUTO: 2.72 M/UL (ref 4.2–5.4)
RBC BLD AUTO: PRESENT
SODIUM SERPL-SCNC: 132 MMOL/L (ref 135–145)
TREPONEMA PALLIDUM IGG+IGM AB [PRESENCE] IN SERUM OR PLASMA BY IMMUNOASSAY: NORMAL
WBC # BLD AUTO: 3.7 K/UL (ref 4.8–10.8)

## 2021-02-17 PROCEDURE — A9270 NON-COVERED ITEM OR SERVICE: HCPCS | Performed by: FAMILY MEDICINE

## 2021-02-17 PROCEDURE — 700102 HCHG RX REV CODE 250 W/ 637 OVERRIDE(OP): Performed by: HOSPITALIST

## 2021-02-17 PROCEDURE — 99232 SBSQ HOSP IP/OBS MODERATE 35: CPT | Performed by: FAMILY MEDICINE

## 2021-02-17 PROCEDURE — 5A1D70Z PERFORMANCE OF URINARY FILTRATION, INTERMITTENT, LESS THAN 6 HOURS PER DAY: ICD-10-PCS | Performed by: INTERNAL MEDICINE

## 2021-02-17 PROCEDURE — 73721 MRI JNT OF LWR EXTRE W/O DYE: CPT | Mod: LT

## 2021-02-17 PROCEDURE — 99233 SBSQ HOSP IP/OBS HIGH 50: CPT | Performed by: INTERNAL MEDICINE

## 2021-02-17 PROCEDURE — 700117 HCHG RX CONTRAST REV CODE 255: Performed by: FAMILY MEDICINE

## 2021-02-17 PROCEDURE — 90935 HEMODIALYSIS ONE EVALUATION: CPT

## 2021-02-17 PROCEDURE — 770020 HCHG ROOM/CARE - TELE (206)

## 2021-02-17 PROCEDURE — 86215 DEOXYRIBONUCLEASE ANTIBODY: CPT

## 2021-02-17 PROCEDURE — 71260 CT THORAX DX C+: CPT

## 2021-02-17 PROCEDURE — 87529 HSV DNA AMP PROBE: CPT

## 2021-02-17 PROCEDURE — 700111 HCHG RX REV CODE 636 W/ 250 OVERRIDE (IP): Performed by: HOSPITALIST

## 2021-02-17 PROCEDURE — 84145 PROCALCITONIN (PCT): CPT

## 2021-02-17 PROCEDURE — G0475 HIV COMBINATION ASSAY: HCPCS

## 2021-02-17 PROCEDURE — 86480 TB TEST CELL IMMUN MEASURE: CPT

## 2021-02-17 PROCEDURE — 700111 HCHG RX REV CODE 636 W/ 250 OVERRIDE (IP): Performed by: FAMILY MEDICINE

## 2021-02-17 PROCEDURE — 700105 HCHG RX REV CODE 258: Performed by: HOSPITALIST

## 2021-02-17 PROCEDURE — 85025 COMPLETE CBC W/AUTO DIFF WBC: CPT

## 2021-02-17 PROCEDURE — 86780 TREPONEMA PALLIDUM: CPT

## 2021-02-17 PROCEDURE — 87799 DETECT AGENT NOS DNA QUANT: CPT

## 2021-02-17 PROCEDURE — 36415 COLL VENOUS BLD VENIPUNCTURE: CPT

## 2021-02-17 PROCEDURE — 700102 HCHG RX REV CODE 250 W/ 637 OVERRIDE(OP): Performed by: FAMILY MEDICINE

## 2021-02-17 PROCEDURE — 86160 COMPLEMENT ANTIGEN: CPT

## 2021-02-17 PROCEDURE — 80053 COMPREHEN METABOLIC PANEL: CPT

## 2021-02-17 PROCEDURE — A9270 NON-COVERED ITEM OR SERVICE: HCPCS | Performed by: HOSPITALIST

## 2021-02-17 RX ORDER — LORAZEPAM 0.5 MG/1
0.5 TABLET ORAL EVERY 6 HOURS PRN
Status: DISCONTINUED | OUTPATIENT
Start: 2021-02-17 | End: 2021-02-17

## 2021-02-17 RX ORDER — OXYCODONE HYDROCHLORIDE 5 MG/1
2.5 TABLET ORAL
Status: DISCONTINUED | OUTPATIENT
Start: 2021-02-17 | End: 2021-02-18 | Stop reason: HOSPADM

## 2021-02-17 RX ORDER — LORAZEPAM 2 MG/ML
0.5 INJECTION INTRAMUSCULAR EVERY 6 HOURS PRN
Status: DISCONTINUED | OUTPATIENT
Start: 2021-02-17 | End: 2021-02-18

## 2021-02-17 RX ORDER — OXYCODONE HYDROCHLORIDE 5 MG/1
5 TABLET ORAL
Status: DISCONTINUED | OUTPATIENT
Start: 2021-02-17 | End: 2021-02-18 | Stop reason: HOSPADM

## 2021-02-17 RX ORDER — HYDROMORPHONE HYDROCHLORIDE 1 MG/ML
0.5 INJECTION, SOLUTION INTRAMUSCULAR; INTRAVENOUS; SUBCUTANEOUS
Status: DISCONTINUED | OUTPATIENT
Start: 2021-02-17 | End: 2021-02-18 | Stop reason: HOSPADM

## 2021-02-17 RX ADMIN — ATENOLOL 25 MG: 25 TABLET ORAL at 18:08

## 2021-02-17 RX ADMIN — MORPHINE SULFATE 4 MG: 4 INJECTION INTRAVENOUS at 03:34

## 2021-02-17 RX ADMIN — SUCRALFATE 1 G: 1 SUSPENSION ORAL at 06:31

## 2021-02-17 RX ADMIN — EPOETIN ALFA-EPBX 10000 UNITS: 10000 INJECTION, SOLUTION INTRAVENOUS; SUBCUTANEOUS at 13:04

## 2021-02-17 RX ADMIN — PROCHLORPERAZINE EDISYLATE 10 MG: 5 INJECTION INTRAMUSCULAR; INTRAVENOUS at 12:43

## 2021-02-17 RX ADMIN — MORPHINE SULFATE 4 MG: 4 INJECTION INTRAVENOUS at 07:45

## 2021-02-17 RX ADMIN — OMEPRAZOLE 20 MG: 20 CAPSULE, DELAYED RELEASE ORAL at 05:50

## 2021-02-17 RX ADMIN — PREDNISONE 5 MG: 10 TABLET ORAL at 18:06

## 2021-02-17 RX ADMIN — OMEPRAZOLE 20 MG: 20 CAPSULE, DELAYED RELEASE ORAL at 18:05

## 2021-02-17 RX ADMIN — ONDANSETRON 4 MG: 2 INJECTION INTRAMUSCULAR; INTRAVENOUS at 03:29

## 2021-02-17 RX ADMIN — HYDROMORPHONE HYDROCHLORIDE 0.5 MG: 1 INJECTION, SOLUTION INTRAMUSCULAR; INTRAVENOUS; SUBCUTANEOUS at 18:10

## 2021-02-17 RX ADMIN — SUCRALFATE 1 G: 1 SUSPENSION ORAL at 18:07

## 2021-02-17 RX ADMIN — LORAZEPAM 0.5 MG: 0.5 TABLET ORAL at 11:19

## 2021-02-17 RX ADMIN — MYCOPHENOLIC ACID 180 MG: 180 TABLET, DELAYED RELEASE ORAL at 18:00

## 2021-02-17 RX ADMIN — SUCRALFATE 1 G: 1 SUSPENSION ORAL at 21:27

## 2021-02-17 RX ADMIN — HYDROXYCHLOROQUINE SULFATE 200 MG: 200 TABLET, FILM COATED ORAL at 18:05

## 2021-02-17 RX ADMIN — ONDANSETRON 4 MG: 2 INJECTION INTRAMUSCULAR; INTRAVENOUS at 18:10

## 2021-02-17 RX ADMIN — LORAZEPAM 0.5 MG: 2 INJECTION INTRAMUSCULAR; INTRAVENOUS at 22:32

## 2021-02-17 RX ADMIN — SODIUM CHLORIDE 3 G: 900 INJECTION INTRAVENOUS at 01:45

## 2021-02-17 RX ADMIN — SENNOSIDES AND DOCUSATE SODIUM 2 TABLET: 8.6; 5 TABLET ORAL at 18:04

## 2021-02-17 RX ADMIN — IOHEXOL 100 ML: 350 INJECTION, SOLUTION INTRAVENOUS at 12:05

## 2021-02-17 RX ADMIN — AMLODIPINE BESYLATE 10 MG: 5 TABLET ORAL at 18:07

## 2021-02-17 RX ADMIN — ONDANSETRON 4 MG: 2 INJECTION INTRAMUSCULAR; INTRAVENOUS at 07:45

## 2021-02-17 RX ADMIN — EPOETIN ALFA-EPBX 2000 UNITS: 2000 INJECTION, SOLUTION INTRAVENOUS; SUBCUTANEOUS at 13:04

## 2021-02-17 ASSESSMENT — ENCOUNTER SYMPTOMS
DIARRHEA: 0
HEADACHES: 0
SHORTNESS OF BREATH: 0
VOMITING: 0
ABDOMINAL PAIN: 0
BACK PAIN: 1
CHILLS: 0
SORE THROAT: 0
FEVER: 0
COUGH: 0
SINUS PAIN: 0
NAUSEA: 0

## 2021-02-17 ASSESSMENT — PAIN DESCRIPTION - PAIN TYPE
TYPE: ACUTE PAIN

## 2021-02-17 NOTE — PROGRESS NOTES
Pt AAOx4. Reports 7/10 pain in the hip and lower back and also reports nausea after eating crackers. Medicated per MAR. Denies SOB, dizziness. Due med given. POC discussed with pt including test/procedures, medication, safety, routine labs. Pt verbalized understanding.  Safety and comfort measures in place. No other needs at this time.

## 2021-02-17 NOTE — PROGRESS NOTES
Telemetry Shift Summary    Rhythm SR-ST  HR Range   Ectopy rPVCs  Measurements .14/.10/.32        Normal Values  Rhythm SR  HR Range    Measurements 0.12-0.20 / 0.06-0.10  / 0.30-0.52

## 2021-02-17 NOTE — PROGRESS NOTES
Telemetry Shift Summary    Rhythm SR  HR Range 66-78  Ectopy non  Measurements 0.18/0.08/0.38        Normal Values  Rhythm SR  HR Range    Measurements 0.12-0.20 / 0.06-0.10  / 0.30-0.52

## 2021-02-17 NOTE — CARE PLAN
Problem: Infection  Goal: Will remain free from infection  Outcome: PROGRESSING AS EXPECTED   Pt remains afebrile. Denies chills. IV abx (Unasyn on board).      Problem: Bowel/Gastric:  Goal: Normal bowel function is maintained or improved  Outcome: PROGRESSING AS EXPECTED   Pt nausea is controlled with oral Ondansetron.     Problem: Pain Management  Goal: Pain level will decrease to patient's comfort goal  Outcome: PROGRESSING AS EXPECTED   Pt is taking IV morphine for pain relief. Educated to inform RN if pain is greater than comfort level. Encouraged to use non-pharmacological measures for pain relief.

## 2021-02-17 NOTE — PROGRESS NOTES
"No acute change from prior assessment except  per pt she wanted to speak to MD prior getting CT w/ contrast due to feeling of discomfort she felt after contrast administration (from previous experience) Marcos from imaging made aware.    0600 During the morning reassessment, pt reports she is feeling anxious stated \"with everything, the tests and labs\" \"I couldn't sleep\" and is requesting if she can have anti-anxiety medication. She also stated that she is still in pain even with morphine on board. \"I think the Morphine is no longer working for me.\" Will relay to day shift.  "

## 2021-02-17 NOTE — PROGRESS NOTES
Hospital Medicine Daily Progress Note    Date of Service  2/17/2021    Chief Complaint  25 y.o. female admitted 2/15/2021 with fever and back pain    Hospital Course  25 y.o. female, with history of end-stage renal disease/nephritis due to lupus on hemodialysis Monday, Wednesday, and Friday (Dr. Trent), recently admitted to this hospital from 2/5/2021 discharged on 2/8/2021 with high fevers, elevated procalcitonin, CRP.  Work-up was negative for clear source of infection.  She had negative blood cultures, negative urine cultures cardiomegaly but no pneumonia.  She is mainly complaining of a new onset of back pain, mostly on her lumbar spine since the beginning of February therefore a CT scan of the lumbar spine was also done.  ID, Dr. Mary was following.  She was on Rocephin here in the ED and completed a 5-day course of empiric antibiotic with significant improvement.  Discharged on cefdinir that she just completed course 5 days ago.  She was told to come back to the ER if having any fever and comes today on 2/15/2021 reporting temperature of 101 at home.    Interval Problem Update  2/16 - No fever since 0436 yesterday morning, no tachycardia. Discussed at length with patient - she specifically denies the following: Ear pain, sinus pain or headache, sore throat, cough or SOB, abdominal pain, diarrhea, skin lesions or breakdowns, dysuria, joint pain other than hips. She denies a history of fevers with her lupus. She denies any sick contacts, has a pet [facility], denies any travel. Only symptom is pain in her bilateral hip (not midline spinal pain), no trauma. .    2/17 - Feels well this morning, no complaints other than hip pain and anxiety.  Anxious about the claustrophobia she experiences in the MRI, will give PRN Ativan. No further fevers, no further tachycardia.     Consultants/Specialty  Nephrology  ID    Code Status  Full Code    Disposition  TBD     Review of Systems  Review of Systems   Constitutional:  Negative for chills and fever.   HENT: Negative for congestion, ear pain, sinus pain and sore throat.    Respiratory: Negative for cough and shortness of breath.    Cardiovascular: Negative for chest pain and leg swelling.   Gastrointestinal: Negative for abdominal pain, diarrhea, nausea and vomiting.   Genitourinary: Negative for dysuria.   Musculoskeletal: Positive for joint pain (Bilateral hips ).   Skin: Negative for rash.   Neurological: Negative for headaches.   All other systems reviewed and are negative.       Physical Exam  Temp:  [36.3 °C (97.4 °F)-36.7 °C (98.1 °F)] 36.3 °C (97.4 °F)  Pulse:  [60-99] 62  Resp:  [16-20] 16  BP: (110-123)/(60-88) 123/83  SpO2:  [97 %-100 %] 100 %    Physical Exam  Vitals and nursing note reviewed.   Constitutional:       General: She is not in acute distress.     Appearance: Normal appearance. She is not toxic-appearing. Ill appearance: Chronically ill appearing.   HENT:      Head: Normocephalic and atraumatic.      Nose: Nose normal.      Mouth/Throat:      Mouth: Mucous membranes are moist.      Pharynx: Oropharynx is clear. No oropharyngeal exudate or posterior oropharyngeal erythema.   Eyes:      General:         Right eye: No discharge.         Left eye: No discharge.      Extraocular Movements: Extraocular movements intact.      Conjunctiva/sclera: Conjunctivae normal.   Cardiovascular:      Rate and Rhythm: Normal rate and regular rhythm.   Pulmonary:      Effort: Pulmonary effort is normal. No respiratory distress.      Breath sounds: Normal breath sounds. No wheezing, rhonchi or rales.   Abdominal:      General: Abdomen is flat. Bowel sounds are normal. There is no distension.      Palpations: Abdomen is soft.      Tenderness: There is no abdominal tenderness. There is no guarding or rebound.   Musculoskeletal:         General: Tenderness (Tenderness over bilateral gluteus tania insertion points) present. No swelling. Normal range of motion.      Cervical back:  Normal range of motion. No rigidity.   Skin:     General: Skin is warm.      Findings: No erythema, lesion or rash.      Comments: Striae to posterior back   Neurological:      General: No focal deficit present.      Mental Status: She is alert and oriented to person, place, and time.   Psychiatric:         Behavior: Behavior normal.         Fluids    Intake/Output Summary (Last 24 hours) at 2/17/2021 0952  Last data filed at 2/17/2021 0800  Gross per 24 hour   Intake 1080 ml   Output 2500 ml   Net -1420 ml       Laboratory  Recent Labs     02/15/21  0140 02/16/21  0322 02/17/21  0505   WBC 8.2 4.2* 3.7*   RBC 2.72* 2.90* 2.72*   HEMOGLOBIN 7.3* 7.7* 7.1*   HEMATOCRIT 24.4* 26.2* 25.6*   MCV 89.7 90.3 94.1   MCH 26.8* 26.6* 26.1*   MCHC 29.9* 29.4* 27.7*   RDW 59.2* 58.4* 61.9*   PLATELETCT 92* 88* 96*   MPV 11.3 11.8 12.2     Recent Labs     02/15/21  0140 02/16/21  0322 02/17/21  0505   SODIUM 127* 133* 132*   POTASSIUM 4.5 4.7 4.7   CHLORIDE 87* 94* 94*   CO2 23 28 26   GLUCOSE 94 92 98   BUN 46* 25* 21   CREATININE 8.22* 5.24* 4.52*   CALCIUM 8.7 8.9 8.9     Recent Labs     02/15/21  0140   INR 1.11               Imaging  DX-HIP-BILATERAL-WITH PELVIS-3/4 VIEWS   Final Result      Left femoral head sclerosis highly suspicious for avascular necrosis      DX-CHEST-2 VIEWS   Final Result      No acute cardiopulmonary abnormality identified.      US-EXTREMITY VENOUS LOWER BILAT   Final Result      MR-LUMBAR SPINE-WITH & W/O   Final Result      1.  MRI of the lumbar spine without and with contrast within normal limits.   2.  Incidentally noted bilateral renal atrophy in keeping with the given history of end-stage renal disease. Several cortical cysts are noted on the right kidney.      EC-ECHOCARDIOGRAM COMPLETE W/O CONT   Final Result      DX-CHEST-PORTABLE (1 VIEW)   Final Result         1.  Hazy pulmonary opacities suggests infiltrates or edema.   2.  Cardiomegaly      MR-HIP-W/O LEFT    (Results Pending)    CT-CHEST,ABDOMEN,PELVIS WITH    (Results Pending)        Assessment/Plan  * SIRS (systemic inflammatory response syndrome) (HCC)  Assessment & Plan  - SIRS criteria identified on my evaluation include:  Fever, with temperature greater than 101 deg F and tachycardic with heart rate above 120 bpm.  Noticed that patient had a recent prior admission with fever of unclear etiology from 2/5/2021 2 2/8/2021.   - Fevers and tachycardia have resolved, continue Unasyn  - Thus far, unclear etiology; MRI spine negative, CXR normal after HD, UA negative, COVID negative, no longer has a gallbladder, dopplers negative  - Avascular necrosis seen on plain films, likely secondary to her chronic steroid usage. To get MRI hip this morning to further quantify and rule out infection.  - No positive blood cultures or warmth of AVF to suggest infection  - Had CT with possible SBO last stay, no diarrhea or constipation per patient  - Appreciate Dr Aguilar's insight - CT c/a/p pending, HIV negative, T Pallidum non reactive   - Blood cultures NTD       Hemorrhagic gastritis with hematemesis - (present on admission)  Assessment & Plan  - Hospitalized in January for UGIB  - Continue Omeprazole and carafate  - Check Hgb in the am - she does get frequent transfusions, follows with Dr Garcia.  Trying to limit transfusions due to antibodies as pt on the transplant list       Lupus (HCC)- (present on admission)  Assessment & Plan  Continue mycophenolate, Plaquenil and prednisone.  Most recent lupus flare was in December 2020.    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  Appreciate Nephrology. MWF schedule.     Avascular necrosis (HCC)  Assessment & Plan  - Likely secondary to chronic steroid usage  - Will check MRI hip to ensure no concurrent infection given her sepsis picture on admission  - Discussed possible referral to Ortho and/or the  prospect of hip replacement in her future dependent on MRI results     Situational anxiety  Assessment & Plan  - PRN Ativan while in hospital     Pancytopenia (HCC)- (present on admission)  Assessment & Plan  - Chronic, secondary to her lupus and treatment  - Stable from previous       VTE prophylaxis: SCDs

## 2021-02-17 NOTE — PROGRESS NOTES
[facility] Progress Note      HD today x 3 hours per Dr. Metz. Initiated at 1252 and ended at 1552.   Patient assessed prior tx.  Alert and oriented x 4.  Post CT and MRI. Nauseated post procedure. Primary Caron MARTINEZ gave nausea meds.      UF Net: 2,000 mL      Please see paper flowsheet for details.      Blood returned. Applied gauze and held CARLENE AVF site for 10 minutes. Verified no bleeding post treatment. Bruit and thrill present post dialysis.   Instructions given to Primary RN that if bleeding occurs on the AVF site, change dressing and held the site with pressure.       Report given to Primary YANDEL Loo RN.

## 2021-02-17 NOTE — ASSESSMENT & PLAN NOTE
- Likely secondary to chronic steroid usage  - Will check MRI hip to ensure no concurrent infection given her sepsis picture on admission  - Discussed possible referral to Ortho and/or the prospect of hip replacement in her future dependent on MRI results

## 2021-02-18 ENCOUNTER — PATIENT OUTREACH (OUTPATIENT)
Dept: HEALTH INFORMATION MANAGEMENT | Facility: OTHER | Age: 26
End: 2021-02-18

## 2021-02-18 ENCOUNTER — APPOINTMENT (OUTPATIENT)
Dept: ONCOLOGY | Facility: MEDICAL CENTER | Age: 26
End: 2021-02-18
Attending: INTERNAL MEDICINE
Payer: COMMERCIAL

## 2021-02-18 VITALS
HEIGHT: 64 IN | HEART RATE: 79 BPM | DIASTOLIC BLOOD PRESSURE: 85 MMHG | TEMPERATURE: 97.5 F | WEIGHT: 135.36 LBS | RESPIRATION RATE: 18 BRPM | OXYGEN SATURATION: 95 % | SYSTOLIC BLOOD PRESSURE: 119 MMHG | BODY MASS INDEX: 23.11 KG/M2

## 2021-02-18 DIAGNOSIS — J81.0 ACUTE PULMONARY EDEMA (HCC): Primary | ICD-10-CM

## 2021-02-18 LAB
ALBUMIN SERPL BCP-MCNC: 3.1 G/DL (ref 3.2–4.9)
ALBUMIN/GLOB SERPL: 0.6 G/DL
ALP SERPL-CCNC: 96 U/L (ref 30–99)
ALT SERPL-CCNC: 11 U/L (ref 2–50)
ANION GAP SERPL CALC-SCNC: 12 MMOL/L (ref 7–16)
AST SERPL-CCNC: 28 U/L (ref 12–45)
BASOPHILS # BLD AUTO: 1 % (ref 0–1.8)
BASOPHILS # BLD: 0.06 K/UL (ref 0–0.12)
BILIRUB SERPL-MCNC: 0.4 MG/DL (ref 0.1–1.5)
BUN SERPL-MCNC: 14 MG/DL (ref 8–22)
CALCIUM SERPL-MCNC: 9 MG/DL (ref 8.4–10.2)
CHLORIDE SERPL-SCNC: 95 MMOL/L (ref 96–112)
CO2 SERPL-SCNC: 27 MMOL/L (ref 20–33)
CREAT SERPL-MCNC: 3.86 MG/DL (ref 0.5–1.4)
EOSINOPHIL # BLD AUTO: 0.05 K/UL (ref 0–0.51)
EOSINOPHIL NFR BLD: 0.9 % (ref 0–6.9)
ERYTHROCYTE [DISTWIDTH] IN BLOOD BY AUTOMATED COUNT: 59.7 FL (ref 35.9–50)
GLOBULIN SER CALC-MCNC: 5.2 G/DL (ref 1.9–3.5)
GLUCOSE SERPL-MCNC: 80 MG/DL (ref 65–99)
HCT VFR BLD AUTO: 26.6 % (ref 37–47)
HGB BLD-MCNC: 7.6 G/DL (ref 12–16)
IMM GRANULOCYTES # BLD AUTO: 0.03 K/UL (ref 0–0.11)
IMM GRANULOCYTES NFR BLD AUTO: 0.5 % (ref 0–0.9)
LYMPHOCYTES # BLD AUTO: 0.66 K/UL (ref 1–4.8)
LYMPHOCYTES NFR BLD: 11.3 % (ref 22–41)
MCH RBC QN AUTO: 26.2 PG (ref 27–33)
MCHC RBC AUTO-ENTMCNC: 28.6 G/DL (ref 33.6–35)
MCV RBC AUTO: 91.7 FL (ref 81.4–97.8)
MONOCYTES # BLD AUTO: 0.44 K/UL (ref 0–0.85)
MONOCYTES NFR BLD AUTO: 7.6 % (ref 0–13.4)
NEUTROPHILS # BLD AUTO: 4.58 K/UL (ref 2–7.15)
NEUTROPHILS NFR BLD: 78.7 % (ref 44–72)
NRBC # BLD AUTO: 0 K/UL
NRBC BLD-RTO: 0 /100 WBC
PLATELET # BLD AUTO: 126 K/UL (ref 164–446)
PMV BLD AUTO: 11.5 FL (ref 9–12.9)
POTASSIUM SERPL-SCNC: 4.5 MMOL/L (ref 3.6–5.5)
PROT SERPL-MCNC: 8.3 G/DL (ref 6–8.2)
RBC # BLD AUTO: 2.9 M/UL (ref 4.2–5.4)
SODIUM SERPL-SCNC: 134 MMOL/L (ref 135–145)
WBC # BLD AUTO: 5.8 K/UL (ref 4.8–10.8)

## 2021-02-18 PROCEDURE — 99239 HOSP IP/OBS DSCHRG MGMT >30: CPT | Performed by: FAMILY MEDICINE

## 2021-02-18 PROCEDURE — A9270 NON-COVERED ITEM OR SERVICE: HCPCS | Performed by: HOSPITALIST

## 2021-02-18 PROCEDURE — 700105 HCHG RX REV CODE 258: Performed by: HOSPITALIST

## 2021-02-18 PROCEDURE — A9270 NON-COVERED ITEM OR SERVICE: HCPCS | Performed by: FAMILY MEDICINE

## 2021-02-18 PROCEDURE — 700102 HCHG RX REV CODE 250 W/ 637 OVERRIDE(OP): Performed by: FAMILY MEDICINE

## 2021-02-18 PROCEDURE — 99232 SBSQ HOSP IP/OBS MODERATE 35: CPT | Performed by: INTERNAL MEDICINE

## 2021-02-18 PROCEDURE — 700111 HCHG RX REV CODE 636 W/ 250 OVERRIDE (IP): Performed by: HOSPITALIST

## 2021-02-18 PROCEDURE — 700102 HCHG RX REV CODE 250 W/ 637 OVERRIDE(OP): Performed by: HOSPITALIST

## 2021-02-18 PROCEDURE — 700111 HCHG RX REV CODE 636 W/ 250 OVERRIDE (IP): Performed by: FAMILY MEDICINE

## 2021-02-18 PROCEDURE — 85025 COMPLETE CBC W/AUTO DIFF WBC: CPT

## 2021-02-18 PROCEDURE — 80053 COMPREHEN METABOLIC PANEL: CPT

## 2021-02-18 PROCEDURE — 36415 COLL VENOUS BLD VENIPUNCTURE: CPT

## 2021-02-18 RX ORDER — ONDANSETRON 4 MG/1
4 TABLET, ORALLY DISINTEGRATING ORAL EVERY 4 HOURS PRN
Qty: 20 TABLET | Refills: 0 | Status: ON HOLD | OUTPATIENT
Start: 2021-02-18 | End: 2021-04-09

## 2021-02-18 RX ORDER — OXYCODONE HYDROCHLORIDE 5 MG/1
5 TABLET ORAL EVERY 6 HOURS PRN
Qty: 20 TABLET | Refills: 0 | Status: SHIPPED | OUTPATIENT
Start: 2021-02-18 | End: 2021-02-23

## 2021-02-18 RX ORDER — LORAZEPAM 0.5 MG/1
0.5 TABLET ORAL EVERY 4 HOURS PRN
Status: DISCONTINUED | OUTPATIENT
Start: 2021-02-18 | End: 2021-02-18 | Stop reason: HOSPADM

## 2021-02-18 RX ADMIN — SUCRALFATE 1 G: 1 SUSPENSION ORAL at 06:17

## 2021-02-18 RX ADMIN — SODIUM CHLORIDE 3 G: 900 INJECTION INTRAVENOUS at 01:33

## 2021-02-18 RX ADMIN — LORAZEPAM 0.5 MG: 0.5 TABLET ORAL at 13:54

## 2021-02-18 RX ADMIN — OMEPRAZOLE 20 MG: 20 CAPSULE, DELAYED RELEASE ORAL at 05:43

## 2021-02-18 RX ADMIN — ONDANSETRON 4 MG: 2 INJECTION INTRAMUSCULAR; INTRAVENOUS at 12:27

## 2021-02-18 RX ADMIN — SUCRALFATE 1 G: 1 SUSPENSION ORAL at 12:15

## 2021-02-18 RX ADMIN — ONDANSETRON 4 MG: 4 TABLET, ORALLY DISINTEGRATING ORAL at 08:27

## 2021-02-18 RX ADMIN — HYDROMORPHONE HYDROCHLORIDE 0.5 MG: 1 INJECTION, SOLUTION INTRAMUSCULAR; INTRAVENOUS; SUBCUTANEOUS at 12:24

## 2021-02-18 RX ADMIN — HYDROMORPHONE HYDROCHLORIDE 0.5 MG: 1 INJECTION, SOLUTION INTRAMUSCULAR; INTRAVENOUS; SUBCUTANEOUS at 08:28

## 2021-02-18 ASSESSMENT — ENCOUNTER SYMPTOMS
VOMITING: 0
ABDOMINAL PAIN: 0
MYALGIAS: 1
CONSTIPATION: 0
DIARRHEA: 0
SHORTNESS OF BREATH: 0
SPUTUM PRODUCTION: 0
COUGH: 0
NAUSEA: 0
FEVER: 0
CHILLS: 0

## 2021-02-18 ASSESSMENT — PAIN DESCRIPTION - PAIN TYPE
TYPE: ACUTE PAIN

## 2021-02-18 NOTE — PROGRESS NOTES
Nephrology Daily Progress Note    Date of Service  2/17/2021    Chief Complaint  25 y.o. female with ESRD on hemodialysis Monday Wednesday Friday who presented 2/15/2021 with back pain.    Interval Problem Update  2/17 -patient had dialysis for 3 days in a row, today being the third day, and 2 L removed earlier today.  Patient denies chest pain, shortness of breath.  Back pain improving.    Review of Systems  Review of Systems   Constitutional: Negative for fever.   Respiratory: Negative for shortness of breath.    Cardiovascular: Negative for chest pain.   Gastrointestinal: Negative for abdominal pain.   Musculoskeletal: Positive for back pain.   All other systems reviewed and are negative.       Physical Exam  Temp:  [36.3 °C (97.4 °F)-36.7 °C (98.1 °F)] 36.3 °C (97.4 °F)  Pulse:  [60-91] 91  Resp:  [16-20] 17  BP: (111-128)/(70-83) 117/76  SpO2:  [90 %-100 %] 90 %    Physical Exam   Constitutional: She is oriented to person, place, and time. She appears well-developed. No distress.   HENT:   Mouth/Throat: Oropharynx is clear and moist. No oropharyngeal exudate.   Eyes: EOM are normal. No scleral icterus.   Neck: No tracheal deviation present.   Cardiovascular: Normal heart sounds.   No murmur heard.  Pulmonary/Chest: Effort normal. No stridor. She has no rales.   Abdominal: Soft. There is no abdominal tenderness.   Musculoskeletal:         General: No edema. Normal range of motion.   Neurological: She is alert and oriented to person, place, and time.   Skin: Skin is warm and dry.   Psychiatric: She has a normal mood and affect.   Access: Right brachiocephalic AV fistula, patent bruit and thrill    Fluids    Intake/Output Summary (Last 24 hours) at 2/17/2021 1657  Last data filed at 2/17/2021 1600  Gross per 24 hour   Intake 960 ml   Output 2500 ml   Net -1540 ml       Laboratory  Labs reviewed, pertinent labs below.  Recent Labs     02/15/21  0140 02/16/21  0322 02/17/21  0505   WBC 8.2 4.2* 3.7*   RBC 2.72*  2.90* 2.72*   HEMOGLOBIN 7.3* 7.7* 7.1*   HEMATOCRIT 24.4* 26.2* 25.6*   MCV 89.7 90.3 94.1   MCH 26.8* 26.6* 26.1*   MCHC 29.9* 29.4* 27.7*   RDW 59.2* 58.4* 61.9*   PLATELETCT 92* 88* 96*   MPV 11.3 11.8 12.2     Recent Labs     02/15/21  0140 02/16/21  0322 02/17/21  0505   SODIUM 127* 133* 132*   POTASSIUM 4.5 4.7 4.7   CHLORIDE 87* 94* 94*   CO2 23 28 26   GLUCOSE 94 92 98   BUN 46* 25* 21   CREATININE 8.22* 5.24* 4.52*   CALCIUM 8.7 8.9 8.9     Recent Labs     02/15/21  0140   INR 1.11           URINALYSIS:  Lab Results   Component Value Date/Time    COLORURINE Yellow 02/15/2021 1108    CLARITY Clear 02/15/2021 1108    SPECGRAVITY 1.015 02/15/2021 1108    PHURINE 8.5 (A) 02/15/2021 1108    KETONES Negative 02/15/2021 1108    PROTEINURIN 30 (A) 02/15/2021 1108    BILIRUBINUR Negative 02/15/2021 1108    UROBILU 0.2 07/16/2020 0900    NITRITE Negative 02/15/2021 1108    LEUKESTERAS Negative 02/15/2021 1108    OCCULTBLOOD Trace (A) 02/15/2021 1108     UPC  Lab Results   Component Value Date/Time    TOTPROTUR 45.0 (H) 07/16/2020 0900      Lab Results   Component Value Date/Time    CREATININEU 18.93 07/16/2020 0900         Imaging reviewed  CT-CHEST,ABDOMEN,PELVIS WITH   Final Result      1.  Atrophic kidneys consistent with chronic kidney disease.   2.  Esophagus is distended with fluid, possibly indicating reflux.   3.  Hazy opacities in both lungs primarily involving lower lobes suggesting edema or pneumonitis.  Covid 19 pneumonia is not excluded.   4.  Mild splenomegaly.   5.  Ill-defined sclerosis in both femoral heads which may represent early changes of avascular necrosis, worse on the LEFT.      MR-HIP-W/O LEFT   Final Result      1.  Avascular necrosis involving the left femoral head anteriorly and superiorly which involves approximately 20% of the articular surface. No evidence of subchondral collapse.      2.  Mild degenerative change of the hips.      3.  Appearance of the proximal femur which has been  described in the clinical syndrome of femoro-acetabular impingement.      DX-HIP-BILATERAL-WITH PELVIS-3/4 VIEWS   Final Result      Left femoral head sclerosis highly suspicious for avascular necrosis      DX-CHEST-2 VIEWS   Final Result      No acute cardiopulmonary abnormality identified.      US-EXTREMITY VENOUS LOWER BILAT   Final Result      MR-LUMBAR SPINE-WITH & W/O   Final Result      1.  MRI of the lumbar spine without and with contrast within normal limits.   2.  Incidentally noted bilateral renal atrophy in keeping with the given history of end-stage renal disease. Several cortical cysts are noted on the right kidney.      EC-ECHOCARDIOGRAM COMPLETE W/O CONT   Final Result      DX-CHEST-PORTABLE (1 VIEW)   Final Result         1.  Hazy pulmonary opacities suggests infiltrates or edema.   2.  Cardiomegaly            Current Facility-Administered Medications   Medication Dose Route Frequency Provider Last Rate Last Admin   • Pharmacy Consult Request ...Pain Management Review 1 Each  1 Each Other PHARMACY TO DOSE Tamra Zapata M.D.       • oxyCODONE immediate-release (ROXICODONE) tablet 2.5 mg  2.5 mg Oral Q3HRS PRN Tamra Zapata M.D.        Or   • oxyCODONE immediate-release (ROXICODONE) tablet 5 mg  5 mg Oral Q3HRS PRN Tamra Zapata M.D.        Or   • HYDROmorphone pf (DILAUDID) injection 0.5 mg  0.5 mg Intravenous Q3HRS PRN Tamra Zapata M.D.       • LORazepam (ATIVAN) injection 0.5 mg  0.5 mg Intravenous Q6HRS PRN Tamra Zapata M.D.       • omeprazole (PRILOSEC) capsule 20 mg  20 mg Oral BID Tamra Zapata M.D.   20 mg at 02/17/21 0550   • mycophenolate (MYFORTIC) TBEC 180 mg  180 mg Oral Q EVENING Tamra Zapata M.D.   180 mg at 02/16/21 1800   • lactated ringers infusion (BOLUS): BMI less than or equal to 30  30 mL/kg Intravenous Once PRN Alix Huntley D.O.       • amLODIPine (NORVASC) tablet 10 mg  10 mg Oral Q EVENING Argentina Chase M.D.   10 mg at 02/16/21 1741   • atenolol  (TENORMIN) tablet 25 mg  25 mg Oral Q EVENING Argentina Chase M.D.   25 mg at 02/16/21 1741   • hydroxychloroquine (Plaquenil) tablet 200 mg  200 mg Oral Q EVENING CHAUNCEY RustDJg   200 mg at 02/16/21 1740   • predniSONE (DELTASONE) tablet 5 mg  5 mg Oral Q EVENING CHAUNCEY RustDJg   5 mg at 02/16/21 1742   • sucralfate (CARAFATE) 1 GM/10ML suspension 1 g  1 g Oral 4X/DAY ACHS Argenitna Chase M.D.   Stopped at 02/17/21 1100   • acetaminophen (Tylenol) tablet 650 mg  650 mg Oral Q6HRS PRN Argentina Chase M.D.       • ondansetron (ZOFRAN) syringe/vial injection 4 mg  4 mg Intravenous Q4HRS PRN Argentina Chase M.D.   4 mg at 02/17/21 0745   • ondansetron (ZOFRAN ODT) dispertab 4 mg  4 mg Oral Q4HRS PRN Argentina Chase M.D.   4 mg at 02/16/21 2143   • promethazine (PHENERGAN) tablet 12.5-25 mg  12.5-25 mg Oral Q4HRS PRN Argentina Chase M.D.       • promethazine (PHENERGAN) suppository 12.5-25 mg  12.5-25 mg Rectal Q4HRS PRN Argentina Chase M.D.       • prochlorperazine (COMPAZINE) injection 5-10 mg  5-10 mg Intravenous Q4HRS PRN Argentina Chase M.D.   10 mg at 02/17/21 1243   • senna-docusate (PERICOLACE or SENOKOT S) 8.6-50 MG per tablet 2 tablet  2 tablet Oral BID Argentina Chase M.D.   2 tablet at 02/16/21 1741    And   • polyethylene glycol/lytes (MIRALAX) PACKET 1 Packet  1 Packet Oral QDAY PRN Argentina Chase M.D.        And   • magnesium hydroxide (MILK OF MAGNESIA) suspension 30 mL  30 mL Oral QDAY PRN Argentina Chase M.D.        And   • bisacodyl (DULCOLAX) suppository 10 mg  10 mg Rectal QDAY PRN Argentina Chase M.D.       • lactated ringers infusion (BOLUS)  500 mL Intravenous Once PRN Argentina Chase M.D.       • ampicillin/sulbactam (UNASYN) 3 g in  mL IVPB  3 g Intravenous Q24HR Argentina Chase M.D.   Stopped at 02/17/21 0215   • epoetin (Retacrit) injection  (Dialysis use only) 10,000 Units  10,000 Units Intravenous MO, WE + FR Karissa Verduzco M.D.   10,000 Units at 02/17/21 1304   • epoetin (Retacrit) injection (Dialysis Use Only) 2,000 Units  2,000 Units Intravenous MO, WE + FR Karissa Verduzco M.D.   2,000 Units at 02/17/21 1304         Assessment/Plan  25 y.o. female with ESRD on hemodialysis Monday Wednesday Friday who presented 2/15/2021 with back pain.     1.  ESRD on hemodialysis Monday Wednesday Friday.  Oligoanuric.  Stable.  Dialysis today per usual schedule, and day 3 after gadolinium exposure on 2/15/2021.  Avoid nephrotoxins.  Check labs daily.  Plan for next dialysis likely on Friday per usual schedule.     2.  Access: Right brachiocephalic AV fistula, stable.  Continue right arm precautions.     3.  Back pain, reason for admission, etiology unclear.  Further work-up and management per primary team.     4.  Anemia of chronic disease, worsening, continue Epogen thrice weekly with dialysis.  Patient was on 12,000 units of Epogen thrice weekly with dialysis as an outpatient, will continue.  Check CBC daily.     5.  Thrombocytopenia, persistent.  Unclear etiology.  Check CBC daily.     6.  Hyponatremia, worsening.  Likely due to excess fluid intake.  Limit hypotonic fluids.  Check labs daily.    7.  Systemic lupus erythematosus.  Possibly patient is in a lupus flare.  Patient is ESRD, so treatment of lupus would be deferred to rheumatology.     Following      Navin Metz MD  Nephrology

## 2021-02-18 NOTE — PROGRESS NOTES
Telemetry Shift Summary    Rhythm SR  HR Range 73-89  Ectopy none  Measurements 0.16/0.08/0.36        Normal Values  Rhythm SR  HR Range    Measurements 0.12-0.20 / 0.06-0.10  / 0.30-0.52

## 2021-02-18 NOTE — DISCHARGE SUMMARY
Discharge Summary    CHIEF COMPLAINT ON ADMISSION  Chief Complaint   Patient presents with   • Low Back Pain     intermittent bilateral flank pain for 3 days   • Nausea   • Fever     Pt stated fever at home was 100.0 F prior to arrival       Reason for Admission  Lower Back Pain      Admission Date  2/15/2021    CODE STATUS  Full Code    HPI & HOSPITAL COURSE  This is a 25 y.o. female here with fever.     Pt is a female with lupus and lupus nephritis with ESRD who was admitted to hospital for the second time with fever; on admission. After previous admission, she was discharged with oral Cefdenir empirically as no source was identified - she presented back to the ER with hip pain and fever, and was started on Unasyn here with almost immediate cessation of fevers and tachycardia.  Workup was extensive and included negative TTE, CT c/a/p, MRI lumbar spine, negative UA, negative COVID and negative dopplers.  CXR had some hazy opacities that cleared with HD, CT done also had some hazy opacities, suspicious for edema as well.  Procalcitonin was elevated throughout the stay.     ID was consulted and ordered RPR which was negative, as well as HIV, which was also negative, and pending tests include CMV quant, TB quant, parvovirus, HSV. They also recommended repeat CT chest in four weeks.     I discussed pt's case with Dr Wong to rule out Lupus flare contributing - C3 complement was normal, C4 was a little low; it was not felt that she was having an acute lupus flare given her relatively benign complement studies, resolution of fever with antibiotics and without recurrence, and lack of other supporting symptoms, but it is possible that her lupus is not optimally controlled at this time, and Dr Wong is contemplating placing the patient on Benlista as an outpatient - he will see her in clinic within two weeks to discuss and did not recommend increasing steroids at this time.     The patient's L hip pain was noted to be  secondary to newly diagnosed avascular necrosis - likely secondary to chronic steroid usage. It involved 20% of the articular surface, but there was no associated subchondral collapse - as such, we will refer her to Orthopedics at discharge. She may need hip replacement in her near future.     Pt has been afebrile since the evening of admission and workup has been largely normal thus far. She has received four days of IV antibiotics here as well as the oral Cefdenir she was on PTA, and ID has recommended no further antibiotics.  We will give her an Rx for Perococet for her hip pain and refer her to Ortho, and have her see Dr Wogn for her lupus within the next two weeks. I have advised her that if her fevers return, to return to hospital.         Therefore, she is discharged in good and stable condition to home with close outpatient follow-up.    The patient met 2-midnight criteria for an inpatient stay at the time of discharge.    Discharge Date  2/18/21    FOLLOW UP ITEMS POST DISCHARGE  CMV quant, TB quant, parvovirus, HSV    DISCHARGE DIAGNOSES  Principal Problem:    SIRS (systemic inflammatory response syndrome) (HCC) POA: Unknown  Active Problems:    Hemorrhagic gastritis with hematemesis  POA: Yes    ESRD (end stage renal disease) on dialysis (HCC) POA: Yes    Lupus (HCC) POA: Yes    Pancytopenia (HCC) POA: Yes    Situational anxiety POA: Unknown    Avascular necrosis (HCC) POA: Unknown  Resolved Problems:    Acute pulmonary edema (HCC) POA: Yes      FOLLOW UP  Future Appointments   Date Time Provider Department Center   3/4/2021  3:30 PM [facility]   3/6/2021  9:45 AM [facility]   3/18/2021 11:00 AM [facility]   3/21/2021  9:00 AM [facility]     Abdoulaye Wong M.D.  [address] [place] [address] [place] [id number]  [facility] [id number] [phone]      Dr Wong's office will contact you for appointment       MEDICATIONS ON  DISCHARGE     Medication List      START taking these medications      Instructions   oxyCODONE immediate-release 5 MG Tabs  Commonly known as: ROXICODONE   Take 1 tablet by mouth every 6 hours as needed for up to 5 days.  Dose: 5 mg        CONTINUE taking these medications      Instructions   acetaminophen 500 MG Tabs  Commonly known as: TYLENOL   Take 500 mg by mouth every 6 hours as needed for Moderate Pain.  Dose: 500 mg     amLODIPine 10 MG Tabs  Commonly known as: NORVASC   Take 10 mg by mouth every evening.  Dose: 10 mg     atenolol 25 MG Tabs  Commonly known as: TENORMIN   Take 25 mg by mouth every evening.  Dose: 25 mg     hydroxychloroquine 200 MG Tabs  Commonly known as: Plaquenil   Take 200 mg by mouth every evening.  Dose: 200 mg     Lasix 40 MG Tabs  Generic drug: furosemide   Take 40 mg by mouth 2 (two) times a day.  Dose: 40 mg     mycophenolate 180 MG EC tablet  Commonly known as: Myfortic   Take 1 Tab by mouth every evening.  Dose: 180 mg     ondansetron 4 MG Tbdp  Commonly known as: ZOFRAN ODT   Take 1 tablet by mouth every four hours as needed for Nausea (give PO if no IV route available).  Dose: 4 mg     pantoprazole 40 MG Tbec  Commonly known as: PROTONIX   Take 1 Tab by mouth 2 times a day.  Dose: 40 mg     predniSONE 5 MG Tabs  Commonly known as: DELTASONE   Take 5 mg by mouth every evening.  Dose: 5 mg     sucralfate 1 GM/10ML Susp  Commonly known as: CARAFATE   Doctor's comments: Can give tablets at same dosage if pt unable to afford liquid preparation, quantity sufficient for one month  Take 10 mL by mouth 4 Times a Day,Before Meals and at Bedtime.  Dose: 1 g        STOP taking these medications    cefdinir 300 MG Caps  Commonly known as: OMNICEF            Allergies  Allergies   Allergen Reactions   • Cephalexin Rash     .   • Clindamycin Rash     .   • Methylprednisolone Unspecified     Anxious   • Metoprolol Rash     .       DIET  Orders Placed This Encounter   Procedures   • Diet Order  Diet: Renal     Standing Status:   Standing     Number of Occurrences:   1     Order Specific Question:   Diet:     Answer:   Renal [8]       ACTIVITY  As tolerated.  Weight bearing as tolerated    CONSULTATIONS  Dr Marvin Aguilar    PROCEDURES  HD    LABORATORY  Lab Results   Component Value Date    SODIUM 134 (L) 02/18/2021    POTASSIUM 4.5 02/18/2021    CHLORIDE 95 (L) 02/18/2021    CO2 27 02/18/2021    GLUCOSE 80 02/18/2021    BUN 14 02/18/2021    CREATININE 3.86 (H) 02/18/2021        Lab Results   Component Value Date    WBC 5.8 02/18/2021    HEMOGLOBIN 7.6 (L) 02/18/2021    HEMATOCRIT 26.6 (L) 02/18/2021    PLATELETCT 126 (L) 02/18/2021        Total time of the discharge process exceeds 37 minutes.

## 2021-02-18 NOTE — PROGRESS NOTES
Nephrology Daily Progress Note    Date of Service  2/18/2021    Chief Complaint  25 y.o. female with ESRD on hemodialysis Monday Wednesday Friday who presented 2/15/2021 with back pain.    Interval Problem Update  2/17 -patient had dialysis for 3 days in a row, today being the third day, and 2 L removed earlier today.  Patient denies chest pain, shortness of breath.  Back pain improving..  2/18 -patient had dialysis yesterday with 2 L removed.  Denies chest pain, shortness of.    Review of Systems  Review of Systems   Constitutional: Negative for fever.   Respiratory: Negative for shortness of breath.    Cardiovascular: Negative for chest pain.   Gastrointestinal: Negative for abdominal pain.   All other systems reviewed and are negative.       Physical Exam  Temp:  [36.4 °C (97.5 °F)-37.2 °C (99 °F)] 36.4 °C (97.5 °F)  Pulse:  [75-97] 79  Resp:  [17-18] 18  BP: (106-123)/(65-86) 119/85  SpO2:  [93 %-100 %] 95 %    Physical Exam   Constitutional: She is oriented to person, place, and time. She appears well-developed. No distress.   HENT:   Mouth/Throat: Oropharynx is clear and moist. No oropharyngeal exudate.   Eyes: EOM are normal. No scleral icterus.   Neck: No tracheal deviation present.   Cardiovascular: Normal heart sounds.   No murmur heard.  Pulmonary/Chest: Effort normal. No stridor. She has no rales.   Abdominal: Soft. There is no abdominal tenderness.   Musculoskeletal:         General: No edema. Normal range of motion.   Neurological: She is alert and oriented to person, place, and time.   Skin: Skin is warm and dry.   Psychiatric: She has a normal mood and affect.   Access: Right brachiocephalic AV fistula, patent bruit and thrill    Fluids    Intake/Output Summary (Last 24 hours) at 2/18/2021 1444  Last data filed at 2/18/2021 0203  Gross per 24 hour   Intake 600 ml   Output 2500 ml   Net -1900 ml       Laboratory  Labs reviewed, pertinent labs below.  Recent Labs     02/16/21  0322 02/17/21  050  02/18/21  0454   WBC 4.2* 3.7* 5.8   RBC 2.90* 2.72* 2.90*   HEMOGLOBIN 7.7* 7.1* 7.6*   HEMATOCRIT 26.2* 25.6* 26.6*   MCV 90.3 94.1 91.7   MCH 26.6* 26.1* 26.2*   MCHC 29.4* 27.7* 28.6*   RDW 58.4* 61.9* 59.7*   PLATELETCT 88* 96* 126*   MPV 11.8 12.2 11.5     Recent Labs     02/16/21  0322 02/17/21  0505 02/18/21  0454   SODIUM 133* 132* 134*   POTASSIUM 4.7 4.7 4.5   CHLORIDE 94* 94* 95*   CO2 28 26 27   GLUCOSE 92 98 80   BUN 25* 21 14   CREATININE 5.24* 4.52* 3.86*   CALCIUM 8.9 8.9 9.0               URINALYSIS:  Lab Results   Component Value Date/Time    COLORURINE Yellow 02/15/2021 1108    CLARITY Clear 02/15/2021 1108    SPECGRAVITY 1.015 02/15/2021 1108    PHURINE 8.5 (A) 02/15/2021 1108    KETONES Negative 02/15/2021 1108    PROTEINURIN 30 (A) 02/15/2021 1108    BILIRUBINUR Negative 02/15/2021 1108    UROBILU 0.2 07/16/2020 0900    NITRITE Negative 02/15/2021 1108    LEUKESTERAS Negative 02/15/2021 1108    OCCULTBLOOD Trace (A) 02/15/2021 1108     UPC  Lab Results   Component Value Date/Time    TOTPROTUR 45.0 (H) 07/16/2020 0900      Lab Results   Component Value Date/Time    CREATININEU 18.93 07/16/2020 0900         Imaging reviewed  CT-CHEST,ABDOMEN,PELVIS WITH   Final Result      1.  Atrophic kidneys consistent with chronic kidney disease.   2.  Esophagus is distended with fluid, possibly indicating reflux.   3.  Hazy opacities in both lungs primarily involving lower lobes suggesting edema or pneumonitis.  Covid 19 pneumonia is not excluded.   4.  Mild splenomegaly.   5.  Ill-defined sclerosis in both femoral heads which may represent early changes of avascular necrosis, worse on the LEFT.      MR-HIP-W/O LEFT   Final Result      1.  Avascular necrosis involving the left femoral head anteriorly and superiorly which involves approximately 20% of the articular surface. No evidence of subchondral collapse.      2.  Mild degenerative change of the hips.      3.  Appearance of the proximal femur which has  been described in the clinical syndrome of femoro-acetabular impingement.      DX-HIP-BILATERAL-WITH PELVIS-3/4 VIEWS   Final Result      Left femoral head sclerosis highly suspicious for avascular necrosis      DX-CHEST-2 VIEWS   Final Result      No acute cardiopulmonary abnormality identified.      US-EXTREMITY VENOUS LOWER BILAT   Final Result      MR-LUMBAR SPINE-WITH & W/O   Final Result      1.  MRI of the lumbar spine without and with contrast within normal limits.   2.  Incidentally noted bilateral renal atrophy in keeping with the given history of end-stage renal disease. Several cortical cysts are noted on the right kidney.      EC-ECHOCARDIOGRAM COMPLETE W/O CONT   Final Result      DX-CHEST-PORTABLE (1 VIEW)   Final Result         1.  Hazy pulmonary opacities suggests infiltrates or edema.   2.  Cardiomegaly            Current Facility-Administered Medications   Medication Dose Route Frequency Provider Last Rate Last Admin   • LORazepam (ATIVAN) tablet 0.5 mg  0.5 mg Oral Q4HRS PRN Tamra Zapata M.D.   0.5 mg at 02/18/21 1354   • Pharmacy Consult Request ...Pain Management Review 1 Each  1 Each Other PHARMACY TO DOSE Tamra Zapata M.D.       • oxyCODONE immediate-release (ROXICODONE) tablet 2.5 mg  2.5 mg Oral Q3HRS PRN Tamra Zapata M.D.        Or   • oxyCODONE immediate-release (ROXICODONE) tablet 5 mg  5 mg Oral Q3HRS PRN Tamra Zapata M.D.        Or   • HYDROmorphone pf (DILAUDID) injection 0.5 mg  0.5 mg Intravenous Q3HRS PRN Tamra Zapata M.D.   0.5 mg at 02/18/21 1224   • omeprazole (PRILOSEC) capsule 20 mg  20 mg Oral BID Tamra Zapata M.D.   20 mg at 02/18/21 0543   • mycophenolate (MYFORTIC) TBEC 180 mg  180 mg Oral Q EVENING Tamra Zapata M.D.   180 mg at 02/17/21 1800   • lactated ringers infusion (BOLUS): BMI less than or equal to 30  30 mL/kg Intravenous Once PRN Alix Huntley D.O.       • amLODIPine (NORVASC) tablet 10 mg  10 mg Oral Q EVENING Argentina Chase, M.D.    10 mg at 02/17/21 1807   • atenolol (TENORMIN) tablet 25 mg  25 mg Oral Q EVENING CHAUNCEY RustDJg   25 mg at 02/17/21 1808   • hydroxychloroquine (Plaquenil) tablet 200 mg  200 mg Oral Q EVENING CHAUNCEY RustDJg   200 mg at 02/17/21 1805   • predniSONE (DELTASONE) tablet 5 mg  5 mg Oral Q EVENING CHAUNCEY RustDJg   5 mg at 02/17/21 1806   • sucralfate (CARAFATE) 1 GM/10ML suspension 1 g  1 g Oral 4X/DAY ACHS Argentina Chase M.D.   1 g at 02/18/21 1215   • acetaminophen (Tylenol) tablet 650 mg  650 mg Oral Q6HRS PRN Argentina Chase M.D.       • ondansetron (ZOFRAN) syringe/vial injection 4 mg  4 mg Intravenous Q4HRS PRN Argentina Chase M.D.   4 mg at 02/18/21 1227   • ondansetron (ZOFRAN ODT) dispertab 4 mg  4 mg Oral Q4HRS PRN Argentina Chase M.D.   4 mg at 02/18/21 0827   • promethazine (PHENERGAN) tablet 12.5-25 mg  12.5-25 mg Oral Q4HRS PRN Argentina Chase M.D.       • promethazine (PHENERGAN) suppository 12.5-25 mg  12.5-25 mg Rectal Q4HRS PRN Argentina Chase M.D.       • prochlorperazine (COMPAZINE) injection 5-10 mg  5-10 mg Intravenous Q4HRS PRN Argentina Chase M.D.   10 mg at 02/17/21 1243   • senna-docusate (PERICOLACE or SENOKOT S) 8.6-50 MG per tablet 2 tablet  2 tablet Oral BID Argentina Chase M.D.   2 tablet at 02/17/21 1804    And   • polyethylene glycol/lytes (MIRALAX) PACKET 1 Packet  1 Packet Oral QDAY PRN Argentina Chase M.D.        And   • magnesium hydroxide (MILK OF MAGNESIA) suspension 30 mL  30 mL Oral QDAY PRN Argentina Chase M.D.        And   • bisacodyl (DULCOLAX) suppository 10 mg  10 mg Rectal QDAY PRN Argentina Chase M.D.       • lactated ringers infusion (BOLUS)  500 mL Intravenous Once PRN Argentina Chase M.D.       • epoetin (Retacrit) injection (Dialysis use only) 10,000 Units  10,000 Units Intravenous KERMIT CHEUNG + FR Karissa Verduzco M.D.   10,000 Units at  02/17/21 1304   • epoetin (Retacrit) injection (Dialysis Use Only) 2,000 Units  2,000 Units Intravenous MO, WE + FR Karissa Verduzco M.D.   2,000 Units at 02/17/21 1304     Current Outpatient Medications   Medication Sig Dispense Refill   • ondansetron (ZOFRAN ODT) 4 MG TABLET DISPERSIBLE Take 1 tablet by mouth every four hours as needed for Nausea (give PO if no IV route available). 20 tablet 0   • oxyCODONE immediate-release (ROXICODONE) 5 MG Tab Take 1 tablet by mouth every 6 hours as needed for up to 5 days. 20 tablet 0   • sucralfate (CARAFATE) 1 GM/10ML Suspension Take 10 mL by mouth 4 Times a Day,Before Meals and at Bedtime. 1200 mL 1   • pantoprazole (PROTONIX) 40 MG Tablet Delayed Response Take 1 Tab by mouth 2 times a day. 60 Tab 0   • furosemide (LASIX) 40 MG Tab Take 40 mg by mouth 2 (two) times a day.     • predniSONE (DELTASONE) 5 MG Tab Take 5 mg by mouth every evening.     • atenolol (TENORMIN) 25 MG Tab Take 25 mg by mouth every evening.     • mycophenolate (MYFORTIC) 180 MG EC tablet Take 1 Tab by mouth every evening. 30 Tab 1   • acetaminophen (TYLENOL) 500 MG Tab Take 500 mg by mouth every 6 hours as needed for Moderate Pain.     • amLODIPine (NORVASC) 10 MG Tab Take 10 mg by mouth every evening.     • hydroxychloroquine (PLAQUENIL) 200 MG Tab Take 200 mg by mouth every evening.           Assessment/Plan  25 y.o. female with ESRD on hemodialysis Monday Wednesday Friday who presented 2/15/2021 with back pain.     1.  ESRD on hemodialysis Monday Wednesday Friday.    Oligoanuric, stable.  No acute need for dialysis today.  Avoid nephrotoxins.  Check labs daily.  Next dialysis likely on Friday per usual schedule.     2.  Access: Right brachiocephalic AV fistula, stable.  Continue right arm precautions.     3.  Back pain, reason for admission, etiology unclear.  Further work-up and management per primary team.     4.  Anemia of chronic disease, persistent.  Continue outpatient Epogen 12,000 units thrice  weekly with dialysis.  Pancytopenia possibly due to lupus flare.  Patient should follow-up with rheumatology.  Check CBC daily.     5.  Thrombocytopenia, persistent.  Possibly due to lupus flare.  Check CBC daily.     6.  Hyponatremia, persistent.  Due to excess fluid intake.  Limit hypotonic fluids.  Check labs daily.    7.  Systemic lupus erythematosus.  It is possible that patient is in a lupus flare.  As patient is ESRD, and oligoanuric, treatment of lupus will be dictated by rheumatology, will defer to them.     Following  OK to discharge from Nephrology standpoint.  There is no need for outpatient nephrology clinic follow up appointment, as the patient will be seen by a nephrologist at their outpatient dialysis center.     Navin Metz MD  Nephrology

## 2021-02-18 NOTE — CARE PLAN
Problem: Communication  Goal: The ability to communicate needs accurately and effectively will improve  Outcome: MET     Problem: Safety  Goal: Will remain free from injury  Outcome: MET  Goal: Will remain free from falls  Outcome: MET     Problem: Infection  Goal: Will remain free from infection  Outcome: MET     Problem: Venous Thromboembolism (VTW)/Deep Vein Thrombosis (DVT) Prevention:  Goal: Patient will participate in Venous Thrombosis (VTE)/Deep Vein Thrombosis (DVT)Prevention Measures  Outcome: MET     Problem: Bowel/Gastric:  Goal: Normal bowel function is maintained or improved  Outcome: MET  Goal: Will not experience complications related to bowel motility  Outcome: MET     Problem: Knowledge Deficit  Goal: Knowledge of disease process/condition, treatment plan, diagnostic tests, and medications will improve  Outcome: MET  Goal: Knowledge of the prescribed therapeutic regimen will improve  Outcome: MET     Problem: Discharge Barriers/Planning  Goal: Patient's continuum of care needs will be met  Outcome: MET     Problem: Pain Management  Goal: Pain level will decrease to patient's comfort goal  Outcome: MET     Problem: Fluid Volume:  Goal: Will maintain balanced intake and output  Outcome: MET     Problem: Respiratory:  Goal: Respiratory status will improve  Outcome: MET

## 2021-02-18 NOTE — PROGRESS NOTES
Infectious Disease Progress Note    Author: Demetra Aguilar M.D. Date & Time of service: 2021  8:37 AM    Chief Complaint:  Fevers in the setting of immune suppression    Interval History:    Review of Systems:  Review of Systems   Constitutional: Negative for chills, fever and malaise/fatigue.   Respiratory: Negative for cough, sputum production and shortness of breath.    Gastrointestinal: Negative for abdominal pain, constipation, diarrhea, nausea and vomiting.   Musculoskeletal: Positive for joint pain and myalgias.       Hemodynamics:  Temp (24hrs), Av.7 °C (98.1 °F), Min:36.5 °C (97.7 °F), Max:37.2 °C (99 °F)  Temperature: 36.7 °C (98 °F)  Pulse  Av.7  Min: 60  Max: 138   Blood Pressure: 106/66       Physical Exam:  Physical Exam  Constitutional:       Appearance: Normal appearance.   Cardiovascular:      Rate and Rhythm: Normal rate and regular rhythm.      Pulses: Normal pulses.      Heart sounds: Normal heart sounds.   Pulmonary:      Effort: Pulmonary effort is normal.      Breath sounds: Normal breath sounds.   Abdominal:      General: Abdomen is flat. Bowel sounds are normal.      Palpations: Abdomen is soft.   Musculoskeletal:         General: No tenderness.      Right lower leg: No edema.      Left lower leg: No edema.   Skin:     General: Skin is warm and dry.   Neurological:      General: No focal deficit present.      Mental Status: She is alert and oriented to person, place, and time.   Psychiatric:         Mood and Affect: Mood normal.         Behavior: Behavior normal.         Meds:    Current Facility-Administered Medications:   •  Pharmacy Consult Request  •  oxyCODONE immediate-release **OR** oxyCODONE immediate-release **OR** HYDROmorphone  •  LORazepam  •  omeprazole  •  mycophenolate  •  LR  •  amLODIPine  •  atenolol  •  hydroxychloroquine  •  predniSONE  •  sucralfate  •  acetaminophen  •  ondansetron  •  ondansetron  •  promethazine  •  promethazine  •   prochlorperazine  •  senna-docusate **AND** polyethylene glycol/lytes **AND** magnesium hydroxide **AND** bisacodyl  •  LR  •  ampicillin-sulbactam (UNASYN) IV  •  epoetin  •  epoetin    Labs:  Recent Labs     02/16/21 0322 02/17/21  0505 02/18/21  0454   WBC 4.2* 3.7* 5.8   RBC 2.90* 2.72* 2.90*   HEMOGLOBIN 7.7* 7.1* 7.6*   HEMATOCRIT 26.2* 25.6* 26.6*   MCV 90.3 94.1 91.7   MCH 26.6* 26.1* 26.2*   RDW 58.4* 61.9* 59.7*   PLATELETCT 88* 96* 126*   MPV 11.8 12.2 11.5   NEUTSPOLYS  --  79.80* 78.70*   LYMPHOCYTES  --  10.10* 11.30*   MONOCYTES  --  8.50 7.60   EOSINOPHILS  --  0.50 0.90   BASOPHILS  --  0.80 1.00   RBCMORPHOLO  --  Present  --      Recent Labs     02/16/21 0322 02/17/21  0505 02/18/21  0454   SODIUM 133* 132* 134*   POTASSIUM 4.7 4.7 4.5   CHLORIDE 94* 94* 95*   CO2 28 26 27   GLUCOSE 92 98 80   BUN 25* 21 14     Recent Labs     02/16/21 0322 02/17/21  0505 02/18/21  0454   ALBUMIN  --  2.9* 3.1*   TBILIRUBIN  --  0.3 0.4   ALKPHOSPHAT  --  67 96   TOTPROTEIN  --  8.2 8.3*   ALTSGPT  --  10 11   ASTSGOT  --  17 28   CREATININE 5.24* 4.52* 3.86*       Imaging:  CT-CHEST,ABDOMEN,PELVIS WITH    Result Date: 2/17/2021 2/17/2021 11:56 AM HISTORY/REASON FOR EXAM:  Sepsis. Fever, bilateral hip pain. TECHNIQUE/EXAM DESCRIPTION: CT scan of the chest, abdomen and pelvis with contrast. Thin-section helical scanning was obtained with intravenous contrast from the lung apices through the pubic symphysis to include the chest, abdomen and pelvis. 100 mL of Omnipaque 350 nonionic contrast was administered intravenously without complication. Low dose optimization technique was utilized for this CT exam including automated exposure control and adjustment of the mA and/or kV according to patient size. COMPARISON: 2/5/2021 FINDINGS: CT Chest: RIGHT subclavian vein stent noted. Thoracic aorta is unremarkable. Esophagus is distended with fluid. Ill-defined somewhat nodular opacities present in the periphery of both  lungs with hazy groundglass opacities in both lower lobes, more extensive than on prior exam. No pleural fluid collection or pneumothorax. CT Abdomen: The liver is unremarkable. Spleen is mildly prominent. Adrenal glands are unremarkable. Both kidneys are atrophic with small cortical cysts, more apparent on the RIGHT. The pancreas is unremarkable. Gallbladder is absent. CT Pelvis: Bladder is unremarkable. Uterus and ovaries are grossly unremarkable. Appendix has a normal appearance. No peritoneal fluid or pneumoperitoneum. Abdominal aorta is unremarkable. Ill-defined sclerosis in both femoral heads, more extensive on the LEFT.     1.  Atrophic kidneys consistent with chronic kidney disease. 2.  Esophagus is distended with fluid, possibly indicating reflux. 3.  Hazy opacities in both lungs primarily involving lower lobes suggesting edema or pneumonitis.  Covid 19 pneumonia is not excluded. 4.  Mild splenomegaly. 5.  Ill-defined sclerosis in both femoral heads which may represent early changes of avascular necrosis, worse on the LEFT.    CT-LSPINE W/O PLUS RECONS    Result Date: 2/6/2021 2/6/2021 5:12 PM HISTORY/REASON FOR EXAM: Back pain. TECHNIQUE/EXAM DESCRIPTION AND NUMBER OF VIEWS: CT lumbar spine without contrast, with reconstructions. Thin-section helical images were obtained of the lumbar spine in the axial plane.  Sagittal reconstructions were generated from the axial data. Low dose optimization technique was utilized for this CT exam including automated exposure control and adjustment of the mA and/or kV according to patient size. FINDINGS: Sagittal reconstructed images demonstrate well maintained vertebral body height and alignment. There is no evidence of fracture or dislocation. There is no significant degenerative disk disease. There are minimal disc bulges at L4-5 and L5-S1. No significant central canal or neuroforaminal narrowing. There is bilateral renal cortical thinning.     1.  No evidence of  fracture of the lumbar spine. 2.  Minimal disc bulges at L4-5 and L5-S1. No central canal or neural foraminal narrowing.    CT-RENAL COLIC EVALUATION(A/P W/O)    Result Date: 2/5/2021 2/5/2021 3:03 PM HISTORY/REASON FOR EXAM:  LOW BACK PAIN; FEVER; FLANK PAIN. TECHNIQUE/EXAM DESCRIPTION AND NUMBER OF VIEWS: CT scan renal/colic without contrast. 5 mm helical images of the abdomen and pelvis were obtained from the diaphragmatic domes through the pubic symphysis. Low dose optimization technique was utilized for this CT exam including automated exposure control and adjustment of the mA and/or kV according to patient size. COMPARISON: 5/29/2020 FINDINGS: Fluid present in the distal esophagus. Visualized lung bases again show minimal prominence of interstitium dependently, similar to prior exam. The liver is unremarkable. The spleen is unremarkable. Adrenal glands are unremarkable. Both kidneys show cortical thinning.  Probable small bilateral kidney cysts. The pancreas is unremarkable. Gallbladder is absent. Dilated proximal small bowel and duodenum. Bladder is decompressed. Increased colonic stool. Appendix has a normal appearance. No peritoneal fluid or pneumoperitoneum. Abdominal aorta is unremarkable. No major bony abnormality is seen. LEFT adnexal cystic structure measuring 2.5 cm. RIGHT ovary is not well-visualized. Uterus is grossly unremarkable.     1.  Atrophic kidneys with probable cysts, similar to prior exam.  Mass is not excluded on noncontrast study. 2.  Dilated duodenum and proximal small bowel may indicate early or partial obstruction. 3.  Mildly increased colonic stool. 4.  Normal appendix. 5.  LEFT ovarian cyst versus dominant follicle.    PH-EYFCXBP-3 VIEW    Result Date: 2/7/2021 2/7/2021 3:00 PM HISTORY/REASON FOR EXAM: Abdominal pain with nausea and vomiting. TECHNIQUE/EXAM DESCRIPTION AND NUMBER OF VIEWS: 1 supine views of the abdomen. COMPARISON: None FINDINGS: There is no evidence of bowel  obstruction. There is no evidence of free intraperitoneal air. There are surgical clips seen.     No evidence of bowel obstruction.    DX-CHEST-2 VIEWS    Result Date: 2/16/2021 2/16/2021 11:53 AM HISTORY/REASON FOR EXAM:  Shortness of breath and fever. End-stage renal disease TECHNIQUE/EXAM DESCRIPTION AND NUMBER OF VIEWS: Two views of the chest. COMPARISON:  1 view chest 2/15/2021 FINDINGS: LUNGS: The lungs are clear. HEART and MEDIASTINUM: enlarged. A stent projects in the expected position of the right subclavian artery or vein. Pleura: There are no pleural effusion or pneumothoraces. Osseous structures: No significant bony abnormality.     No acute cardiopulmonary abnormality identified.    DX-CHEST-PORTABLE (1 VIEW)    Result Date: 2/15/2021    2/15/2021 3:05 AM HISTORY/REASON FOR EXAM: Fever TECHNIQUE/EXAM DESCRIPTION:  Single AP view of the chest. COMPARISON: February 5, 2021 FINDINGS: Overlying cardiac leads are present. Cardiomegaly is observed. The mediastinal contour appears within normal limits.  The central pulmonary vasculature appears normal. Bilateral lung volumes are diminished.  Diffuse scattered hazy pulmonary parenchymal opacities are seen. No significant pleural effusions are identified. The bony structures appear age-appropriate.     1.  Hazy pulmonary opacities suggests infiltrates or edema. 2.  Cardiomegaly    DX-CHEST-PORTABLE (1 VIEW)    Result Date: 2/5/2021 2/5/2021 2:11 PM HISTORY/REASON FOR EXAM: Fever and flank pain. TECHNIQUE/EXAM DESCRIPTION AND NUMBER OF VIEWS: Single portable view of the chest. COMPARISON: None FINDINGS: There is no evidence of focal consolidation or evidence of pulmonary edema. There is no pleural effusion. The heart is enlarged. There is a right subclavian vascular stent.     Cardiomegaly.    MR-HIP-W/O LEFT    Result Date: 2/17/2021 2/17/2021 11:13 AM HISTORY/REASON FOR EXAM: Left hip pain. Abnormal x-ray.. TECHNIQUE/EXAM DESCRIPTION: MRI of the LEFT hip  without contrast. The study was performed on a [employer] Signa 1.5 Oumou MRI scanner. T1 coronal wide field of view of the pelvis, T2 fat-suppressed axial, T2 fat-suppressed coronal, and T2 fat-suppressed sagittal images were obtained of the hip. COMPARISON: None. FINDINGS: Osseous structures/cartilaginous surfaces: There is subchondral marrow signal alteration involving the left femoral head which is geographic in appearance and characterized by increased T2 and T1 signal with a serpiginous low signal peripheral line consistent with avascular necrosis. This involves the anterior/superior aspect of the femoral head involves approximately 20% of the articular surface. Additionally seen is osteoarthritis of the hips bilaterally characterized by small femoral head osteophytes. There is cartilaginous thinning of the left acetabulum. There is also loss of the normal femoral head/neck offset with small amount of cystic change anteriorly and superiorly at the femoral head/neck junction. These findings have been described in the clinical setting of femoral acetabular impingement. Acetabular labrum: The acetabular labrum is normal in appearance. There is no evidence of tear. Miscellaneous: There is mild tendinopathy of the gluteus medius and minimus tendons. There is diffuse muscle atrophy.     1.  Avascular necrosis involving the left femoral head anteriorly and superiorly which involves approximately 20% of the articular surface. No evidence of subchondral collapse. 2.  Mild degenerative change of the hips. 3.  Appearance of the proximal femur which has been described in the clinical syndrome of femoro-acetabular impingement.    MR-LUMBAR SPINE-WITH & W/O    Result Date: 2/15/2021  2/15/2021 11:06 AM HISTORY/REASON FOR EXAM:  Back pain or radiculopathy, cancer or infection suspected; suspected. Severe back pain and fever, second admission and unclear source of infection. End-stage renal disease. Fever. Low back pain for 3 days.  TECHNIQUE/EXAM DESCRIPTION: MRI of the lumbar spine without and with contrast. The study was performed on a [employer] Signa 1.5 Oumou MRI scanner. T1 sagittal, T2 fast spin-echo sagittal, and T2 axial images were obtained of the lumbar spine. T1 post-contrast fat-suppressed sagittal images were obtained. Optional T2 fat-suppressed sagittal and T1 post-contrast axial images may be obtained. 13 mL ProHance gadolinium contrast was administered intravenously. COMPARISON:  CT of the abdomen and pelvis, renal colic CT 2/5/2021, CT lumbar spine 2/6/2021 FINDINGS: Alignment in the lumbar spine is normal. Marrow signal in the vertebral bodies is normal. No evidence of vertebral body infarction or osteonecrosis. No evidence of discitis or osteomyelitis. No evidence of pathologic compression fracture. The prevertebral and paraspinous soft tissues are unremarkable.. Incidentally noted, the kidneys are atrophic and there are several renal cortical cysts on the right. The conus is normal in position and signal with its tip at the L2 level. There is no abnormal cord enhancement. There is no abnormal intradural extra medullary enhancement. Disc height and signal is normal at all lumbar levels. At T12-L1 through L5-S1, there is no significant disc bulge or protrusion. There is no central or foraminal stenosis at any lumbar level.     1.  MRI of the lumbar spine without and with contrast within normal limits. 2.  Incidentally noted bilateral renal atrophy in keeping with the given history of end-stage renal disease. Several cortical cysts are noted on the right kidney.    US-EXTREMITY VENOUS LOWER BILAT    Result Date: 2/16/2021   Vascular Laboratory  CONCLUSIONS  No superficial or deep venous thrombosis.  CASE REYNOSO  Exam Date:     02/16/2021 10:26  Room #:     Inpatient  Priority:     Routine  Ht (in):             Wt (lb):  Ordering Physician:        MODESTO CHARLES  Referring Physician:       [id number] Gonzáles  Sonographer:                Aleyda Gutiérrez RVT  Study Type:                Complete Bilateral  Technical Quality:         Adequate  Age:    25    Gender:     F  MRN:    [id number]  :    1995      BSA:  Indications:     Encounter for screening for cardiovascular disorders  CPT Codes:       19650  ICD Codes:       Z13.6  History:         Recurrent fever, rule out deep venous thrombosis. No prior                   study.  Limitations:  PROCEDURES:  Bilateral lower extremity venous duplex imaging.  The following venous structures were evaluated: common femoral, profunda  femoral, greater saphenous, femoral, popliteal , peroneal and posterior  tibial veins.  Serial compression, augmentation maneuvers,  color and spectral Doppler  flow evaluations were performed.  FINDINGS:  Bilateral lower extremities -.  No superficial or deep venous thrombosis.  Complete color filling and compressibility with normal venous flow dynamics  including spontaneous flow, response to augmentation maneuvers, and  respiratory phasicity.  Thad Prater MD  (Electronically Signed)  Final Date:      2021                   12:39    EC-ECHOCARDIOGRAM COMPLETE W/O CONT    Result Date: 2/15/2021  Transthoracic Echo Report Echocardiography Laboratory CONCLUSIONS Left ventricular ejection fraction is visually estimated to be 55%. Estimated right ventricular systolic pressure  is 40 mmHg mildly elevated right-sided pressures. Compared to prior Echo - 20, no significant change. CASE REYNOSO Exam Date:         02/15/2021                    07:21 Exam Location:     Inpatient Priority:          Routine Ordering Physician:        JOHN HOLLINS Referring Physician: Sonographer:               Vaishnavi MAYEN Age:    25     Gender:    Fema                           le MRN:    [id number] :    1995 BSA:    1.66   Ht (in):    64     Wt (lb):    135 Exam Type:     Complete  Indications:     Fever, unknown origin ICD Codes:       780.6 CPT Codes:       45231 BP:   164    /   100    HR:   115 Technical Quality:       Fair MEASUREMENTS  (Male / Female) Normal Values 2D ECHO LV Diastolic Diameter PLAX        5.2 cm                4.2 - 5.9 / 3.9 - 5.3 cm LV Systolic Diameter PLAX         4 cm                  2.1 - 4.0 cm IVS Diastolic Thickness           0.91 cm               LVPW Diastolic Thickness          0.96 cm               LVOT Diameter                     1.8 cm                Estimated LV Ejection Fraction    55 %                  LV Ejection Fraction MOD BP       54 %                  >= 55  % LV Ejection Fraction MOD 4C       55.4 %                LV Ejection Fraction MOD 2C       50.7 %                IVC Diameter                      1.5 cm                DOPPLER AV Peak Velocity                  1.7 m/s               AV Peak Gradient                  11.3 mmHg             AV Mean Gradient                  7.6 mmHg              LVOT Peak Velocity                1.4 m/s               AV Area Cont Eq vti               2.1 cm2               TR Peak Velocity                  306 cm/s              PV Peak Velocity                  1.1 m/s               PV Peak Gradient                  5.1 mmHg              RVOT Peak Velocity                0.86 m/s              * Indicates values subject to auto-interpretation LV EF:  55    % FINDINGS Left Ventricle Normal left ventricular chamber size. Normal left ventricular wall thickness. Left ventricular systolic function is low normal. Left ventricular ejection fraction is visually estimated to be 55%. Normal regional wall motion. Grade II diastolic dysfunction. Right Ventricle The right ventricle was normal in size and function. Right Atrium The right atrium is normal in size.  Normal inferior vena cava size and inspiratory collapse. Left Atrium Moderately dilated left atrium. Left atrial volume index is 43 mL/sq m. Mitral Valve  Structurally normal mitral valve without significant stenosis. Mild mitral regurgitation. Aortic Valve Tricuspid aortic valve. No stenosis or regurgitation seen. Tricuspid Valve Structurally normal tricuspid valve without significant stenosis. Mild tricuspid regurgitation. Estimated right ventricular systolic pressure  is 40 mmHg. Right atrial pressure is estimated to be 3 mmHg. Pulmonic Valve The pulmonic valve is not well visualized. No pulmonic stenosis. Trace pulmonic insufficiency. Pericardium Normal pericardium without effusion. Aorta The aortic root is normal.  Ascending aorta diameter is 3.5 cm. Poonam Arnold MD (Electronically Signed) Final Date:     15 February 2021                 11:16    DX-HIP-BILATERAL-WITH PELVIS-3/4 VIEWS    Result Date: 2/16/2021 2/16/2021 11:53 AM HISTORY/REASON FOR EXAM:  Atraumatic Pelvic/Hip Pain. Low back and bilateral hip pain TECHNIQUE/EXAM DESCRIPTION AND NUMBER OF VIEWS:  3 views of the right and left hip. COMPARISON: CT abdomen and pelvis 2/5/2021 FINDINGS: BONY PELVIS: normal in appearance. The left femoral head is sclerotic highly suspicious for avascular necrosis. Right femoral head appears normal. HIPS: Normal in appearance. There are no fracture or malalignment. Sacroiliac joints:Normal in appearance. LUMBAR spine: Normal in appearance. ABDOMEN: Single surgical clip in the right lower quadrant.    Left femoral head sclerosis highly suspicious for avascular necrosis      Micro:  Results     Procedure Component Value Units Date/Time    Blood Culture [[id number]] Collected: 02/15/21 0130    Order Status: Completed Specimen: Blood from Peripheral Updated: 02/16/21 0652     Significant Indicator NEG     Source BLD     Site PERIPHERAL     Culture Result No Growth  Note: Blood cultures are incubated for 5 days and  are monitored continuously.Positive blood cultures  are called to the RN and reported as soon as  they are identified.  Blood culture testing and Gram stain,  "if indicated, are  performed at [facility], [address] [place] [address].  Positive blood cultures are  sent to [facility], [address] [place] [address], for organism identification and  susceptibility testing.      Narrative:      From different peripheral sites, if not done within the last  24 hours (Per Hospital Policy: Only change specimen source to  \"Line\" if specified by physician order)  Left Hand    Blood Culture [[id number]] Collected: 02/15/21 0140    Order Status: Completed Specimen: Blood from Peripheral Updated: 02/16/21 0652     Significant Indicator NEG     Source BLD     Site PERIPHERAL     Culture Result No Growth  Note: Blood cultures are incubated for 5 days and  are monitored continuously.Positive blood cultures  are called to the RN and reported as soon as  they are identified.  Blood culture testing and Gram stain, if indicated, are  performed at [facility], [address] [place] [address].  Positive blood cultures are  sent to [facility], [address] [place] [address], for organism identification and  susceptibility testing.      Narrative:      From different peripheral sites, if not done within the last  24 hours (Per Hospital Policy: Only change specimen source to  \"Line\" if specified by physician order)  Left AC    URINALYSIS [[id number]]  (Abnormal) Collected: 02/15/21 1108    Order Status: Completed Updated: 02/15/21 1200     Color Yellow     Character Clear     Specific Gravity 1.015     Ph 8.5     Glucose Negative mg/dL      Ketones Negative mg/dL      Protein 30 mg/dL      Bilirubin Negative     Nitrite Negative     Leukocyte Esterase Negative     Occult Blood Trace     Micro Urine Req Microscopic    COV-2, FLU A/B, AND RSV BY PCR (2-4 HOURS CEPHEID): Collect NP swab in [place] [[id number]] Collected: 02/15/21 0400    Order Status: Completed Specimen: Respirate from Nasopharyngeal " "Updated: 02/15/21 0446     Influenza virus A RNA Negative     Influenza virus B, PCR Negative     RSV, PCR Negative     SARS-CoV-2 by PCR NotDetected     Comment: PATIENTS: Important information regarding your results and instructions can  be found at [email or web address]   \"After your  Covid-19 Test\"  [facility] providers: PLEASE REFER TO DE-ESCALATION AND RETESTING PROTOCOL  on [email or web address]  **The [employer] GeneXpert Xpress SARS-CoV-2 Test has been made available for  use under the Emergency Use Authorization (EUA) only.          SARS-CoV-2 Source NP Swab    Narrative:      Have you been in close contact with a person who is suspected  or known to be positive for COVID-19 within the last 30 days  (e.g. last seen that person < 30 days ago)->Unknown    Urinalysis [[id number]]     Order Status: Sent Specimen: Urine           Assessment:  Active Hospital Problems    Diagnosis    • *SIRS (systemic inflammatory response syndrome) (HCC) [R65.10]    • Hemorrhagic gastritis with hematemesis  [K29.71]    • Lupus (HCC) [M32.9]    • ESRD (end stage renal disease) on dialysis (HCC) [N18.6, Z99.2]    • Situational anxiety [F41.8]    • Avascular necrosis (HCC) [M87.00]    • Pancytopenia (HCC) [D61.818]      Interval 24 hours:      AF, O2 RA  Labs reviewed  Imaging personally reviewed both images and report.   Studies reviewed  Micro reviewed    Patient somewhat flattened feeling down today.  She is having some new pain in right knee which she states is chronic and comes and goes.  Also with pain in the left knee and known avascular necrosis.  No clear infectious etiology for her prior fever so antibiotics were stopped.    Assessment:  25 y.o.  admitted 2/15/2021. Pt has a past medical history of SLE and lupus nephritis with end-stage renal disease and on hemodialysis.  She is immune suppressed on prednisone 5 mg daily, mycophenolate and Plaquenil. She was recently admitted with fevers elevated " procalcitonin and underwent work-up which was negative for infection.  She was given ceftriaxone and then discharged with oral antibiotics for additional 5 days.  She now returns complaining of new onset back pain and fevers at home.  She is from [place] and moved here when she was 8 and has lived in the [facility] ever since.     Hospital Course:   Fevers in the ER to 101.5 and tachycardia, now improved.  WBC of 8.2 likely represents leukocytosis given her chronic low levels.  Blood cultures were obtained and she was started on Unasyn.  This x-ray unremarkable MRI spine unremarkable.  Ultrasound of bilateral lower extremities with no DVT.     Problem List      Fevers, unclear etiology, highly suspicious avascular necrosis in the hip but unclear if this can cause fevers, pt with SLE and immunosuppressed so will work-up broadly  -Blood cultures so far no growth today  -TTE unremarkable with no vegetations, significant valvular disease and EF 55%  - CT C/A/P- Esophagus is distended with fluid, possibly indicating reflux.  Hazy opacities in both lungs primarily involving lower lobes suggesting edema or pneumonitis.    Back pain  -MRI lumbar spine is unremarkable other than incidentally noted bilateral renal atrophy  Hip pain  -Plain film of the hips left femoral head sclerosis highly suspicious for avascular necrosis  -MRI with avascular necrosis involving the left femoral head anteriorly and superiorly. No evidence of subchondral collapse.  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  Immunosuppressed - secondary to above on prednisone 5 mg daily, mycophenolate and Plaquenil (hydroxychloroquine)  Leukopenia, chronic  Anemia, chronic  Thrombocytopenia, chronic  -Pancytopenia is likely multifactorial secondary to SLE as well as end-stage renal disease, can also be seen with mycophenolate and hydroxychloroquine  Diagnosed with hemorrhagic gastritis in 1/21     Plan       --- No obvious infection- stopped antibiotics   ---  Left hip avascular necrosis, defer to primary for management   --- Ordered CMV quant, TB quant, parvovirus (given her pancytopenia), HSV PCR, syphillis (neg) and HIV (neg) - will follow to final and notify pt if any concerns   --- Recommend follow-up with her rheumatologist on discharge given recurrent hospitalizations to see if any alteration in her medications would be needed  --- Recommend follow-up with PCP and repeat CT chest in ~4 weeks to ensure no worsening   --- F/up with GI for known hemorrhagic gastritis          Plan of care discussed with Dr. Zaapta.  ID will sign off.

## 2021-02-18 NOTE — PROGRESS NOTES
Pt AAOx4. Reports pain is at comfortable level and nausea is better at the moment. Denies any SOB, dizziness.  POC discussed with pt including pain and nausea management, safety, routine labs. PT verbalized understanding. Safety and comfort measures in place. No other needs at this time.

## 2021-02-18 NOTE — PROGRESS NOTES
Monitor Summary     Rhythm:SR-ST   Measurements: 0.16/0.08/0.36  ECTOPIES: rpvc        Normal Values  Rhythm SR  HR Range    Measurements 0.12-0.20 / 0.06-0.10  / 0.30-0.52

## 2021-02-18 NOTE — DISCHARGE INSTRUCTIONS
Avascular Necrosis  Avascular necrosis is death of bone tissue due to lack of blood supply. This condition may also be called:  · Osteonecrosis.  · Aseptic necrosis.  · Ischemic bone necrosis.  Without proper blood supply, the internal layer of the affected bone dies. Over time, small breaks form in the outer layer of the bone. If this process affects a bone near a joint, the joint may collapse or move out of place (become dislocated). Joints that may be affected include the jaw, wrist, fingers, knee, and foot.  Avascular necrosis most commonly affects:  · The hip joint, especially the top of the thigh bone (femoral head).  · The top of the upper arm bone (humeral head).  What are the causes?  This condition may be caused by:  · Damage or injury to a bone or joint.  · Using steroid medicine such as prednisone for a long time.  · Changes in the body's disease-fighting system (immune system).  · Changes in the body's system of chemicals that regulate body processes (hormones).  · A lot of exposure to radiation.  What increases the risk?  The following factors may make you more likely to develop this condition:  · Alcohol abuse.  · A history of joint injury.  · Long-term or frequent use of steroid medicines.  · Having a medical condition such as:  ? HIV or AIDS.  ? Diabetes.  ? Sickle cell disease.  ? A disease in which your body's immune system attacks your body's own healthy tissues (autoimmune disease).  What are the signs or symptoms?  The main symptoms of avascular necrosis are:  · Pain. If an affected joint collapses, the pain may suddenly get severe.  · Decreased ability to move the affected bone or joint.  How is this diagnosed?  Avascular necrosis may be diagnosed based on:  · Your symptoms and medical history.  · A physical exam.  · Imaging tests, such as X-rays, bone scans, and an MRI.  How is this treated?  Treatment for this condition may include:  · Pain medicine, such as NSAIDs.  · Medicine to improve  bone growth.  · Avoiding placing any pressure or weight on the affected area. If avascular necrosis occurs in your hip, ankle, or foot, you may need to use a device to help you move around (assistive device), such as crutches or a rolling scooter.  · Physical therapy to help regain strength and motion in the affected area.  · Surgery, such as:  ? Core decompression. In this surgery, one or more holes are placed in the bone for new blood vessels to grow into. This provides a renewed blood supply to the bone. This surgery may reduce pain and pressure in the affected bone and slow the destruction of bones and joints.  ? Osteotomy. In this surgery, the bone is reshaped to reduce stress on the affected area of the joint.  ? Bone grafting. In this surgery, healthy bone from a different part of your body is used to replace damaged bone.  ? Total joint replacement (arthroplasty). In this surgery, the affected surfaces of bone on one or both sides of a joint are replaced with artificial parts (prostheses).  · Electrical stimulation (also called e-stim). During this procedure, a probe over the skin sends shock waves into the body. This may help to encourage new bone growth.  · High-pressure oxygen therapy (hyperbaric oxygen). This is rarely used.  Follow these instructions at home:  Activity  · Ask your health care provider what activities are safe for you.  · If physical therapy was prescribed, do exercises as told by your health care provider.  · Avoid placing any pressure or weight on the affected area, as told by your health care provider. Use assistive devices as instructed.  Managing pain, stiffness, and swelling  · If directed, apply heat to the affected area as often as told by your health care provider. Heat can reduce the stiffness of your muscles and joints. Use the heat source that your health care provider recommends, such as a moist heat pack or a heating pad.  ? Place a towel between your skin and the heat  source.  ? Leave the heat on for 20-30 minutes.  ? Remove the heat if your skin turns bright red. This is especially important if you are unable to feel pain, heat, or cold. You may have a greater risk of getting burned.  · If directed, put ice on affected areas. Icing can help to relieve joint pain and swelling.  ? Put ice in a plastic bag.  ? Place a towel between your skin and the bag.  ? Leave the ice on for 20 minutes, 2-3 times a day.  General instructions    · Take over-the-counter and prescription medicines only as told by your health care provider.  · Do not drive or use heavy machinery while taking prescription pain medicine.  · If a bone in your arm or leg is affected, ask your health care provider if it is safe for you to drive.  · Do not use any products that contain nicotine or tobacco, such as cigarettes and e-cigarettes. These can delay bone healing. If you need help quitting, ask your health care provider.  · Do not drink alcohol.  · Keep all follow-up visits as told by your health care provider. This is important.  Contact a health care provider if:  · You have pain that gets worse or does not get better with medicine.  · Your ability to move your joint gets worse.  Get help right away if:  · Your pain suddenly becomes severe.  Summary  · Avascular necrosis is death of bone tissue due to a lack of blood supply.  · Without proper blood supply, the internal layer of the affected bone dies. Over time, small breaks form in the outer layer of the bone.  · Avoid putting any pressure or weight on the affected area. You may need to use a device to help you move around (assistive device), such as crutches or a rolling scooter.  This information is not intended to replace advice given to you by your health care provider. Make sure you discuss any questions you have with your health care provider.  Document Released: 06/09/2003 Document Revised: 11/30/2018 Document Reviewed: 11/27/2018  Elsevier Patient  Education © 2020 [employer]  Discharge Instructions    Discharged to home by car with relative. Discharged via wheelchair, hospital escort: Yes.  Special equipment needed: Not Applicable    Be sure to schedule a follow-up appointment with your primary care doctor or any specialists as instructed.     Discharge Plan:   Diet Plan: Discussed  Activity Level: Discussed  Confirmed Follow up Appointment: Patient to Call and Schedule Appointment  Confirmed Symptoms Management: Discussed  Medication Reconciliation Updated: Yes  Influenza Vaccine Indication: Not indicated: Previously immunized this influenza season and > 8 years of age    I understand that a diet low in cholesterol, fat, and sodium is recommended for good health. Unless I have been given specific instructions below for another diet, I accept this instruction as my diet prescription.   Other diet: Regular diet    Special Instructions: None    · Is patient discharged on Warfarin / Coumadin?   No     Depression / Suicide Risk    As you are discharged from this [facility] facility, it is important to learn how to keep safe from harming yourself.    Recognize the warning signs:  · Abrupt changes in personality, positive or negative- including increase in energy   · Giving away possessions  · Change in eating patterns- significant weight changes-  positive or negative  · Change in sleeping patterns- unable to sleep or sleeping all the time   · Unwillingness or inability to communicate  · Depression  · Unusual sadness, discouragement and loneliness  · Talk of wanting to die  · Neglect of personal appearance   · Rebelliousness- reckless behavior  · Withdrawal from people/activities they love  · Confusion- inability to concentrate     If you or a loved one observes any of these behaviors or has concerns about self-harm, here's what you can do:  · Talk about it- your feelings and reasons for harming yourself  · Remove any means that you might use to hurt  yourself (examples: pills, rope, extension cords, firearm)  · Get professional help from the community (Mental Health, Substance Abuse, psychological counseling)  · Do not be alone:Call your Safe Contact- someone whom you trust who will be there for you.  · Call your local CRISIS HOTLINE 349-9000 or 648-837-9068  · Call your local Children's Mobile Crisis Response Team [place] (935) 251-4062 or wwwIPICO  · Call the toll free National Suicide Prevention Hotlines   · National Suicide Prevention Lifeline 883-194-QJLB (5874)  · National Hope Line Network 800-SUICIDE (340-1129)    Oxycodone tablets or capsules  What is this medicine?  OXYCODONE (ox i KOE done) is a pain reliever. It is used to treat moderate to severe pain.  This medicine may be used for other purposes; ask your health care provider or pharmacist if you have questions.  COMMON BRAND NAME(S): Dazidox, Endocodone, Oxaydo, OXECTA, OxyIR, Percolone, Roxicodone, Roxybond  What should I tell my health care provider before I take this medicine?  They need to know if you have any of these conditions:  · [place]'s disease  · brain tumor  · head injury  · heart disease  · history of drug or alcohol abuse problem  · if you often drink alcohol  · kidney disease  · liver disease  · lung or breathing disease, like asthma  · mental illness  · pancreatic disease  · seizures  · thyroid disease  · an unusual or allergic reaction to oxycodone, codeine, hydrocodone, morphine, other medicines, foods, dyes, or preservatives  · pregnant or trying to get pregnant  · breast-feeding  How should I use this medicine?  Take this medicine by mouth with a glass of water. Follow the directions on the prescription label. You can take it with or without food. If it upsets your stomach, take it with food. Take your medicine at regular intervals. Do not take it more often than directed. Do not stop taking except on your doctor's advice.  Some brands of this medicine, like  Oxecta, have special instructions. Ask your doctor or pharmacist if these directions are for you: Do not cut, crush or chew this medicine. Swallow only one tablet at a time. Do not wet, soak, or lick the tablet before you take it.  A special MedGuide will be given to you by the pharmacist with each prescription and refill. Be sure to read this information carefully each time.  Talk to your pediatrician regarding the use of this medicine in children. Special care may be needed.  Overdosage: If you think you have taken too much of this medicine contact a poison control center or emergency room at once.  NOTE: This medicine is only for you. Do not share this medicine with others.  What if I miss a dose?  If you miss a dose, take it as soon as you can. If it is almost time for your next dose, take only that dose. Do not take double or extra doses.  What may interact with this medicine?  This medicine may interact with the following medications:  · alcohol  · antihistamines for allergy, cough and cold  · antiviral medicines for HIV or AIDS  · atropine  · certain antibiotics like clarithromycin, erythromycin, linezolid, rifampin  · certain medicines for anxiety or sleep  · certain medicines for bladder problems like oxybutynin, tolterodine  · certain medicines for depression like amitriptyline, fluoxetine, sertraline  · certain medicines for fungal infections like ketoconazole, itraconazole, voriconazole  · certain medicines for migraine headache like almotriptan, eletriptan, frovatriptan, naratriptan, rizatriptan, sumatriptan, zolmitriptan  · certain medicines for nausea or vomiting like dolasetron, ondansetron, palonosetron  · certain medicines for Parkinson's disease like benztropine, trihexyphenidyl  · certain medicines for seizures like phenobarbital, phenytoin, primidone  · certain medicines for stomach problems like dicyclomine, hyoscyamine  · certain medicines for travel sickness like  scopolamine  · diuretics  · general anesthetics like halothane, isoflurane, methoxyflurane, propofol  · ipratropium  · local anesthetics like lidocaine, pramoxine, tetracaine  · MAOIs like Carbex, Eldepryl, Marplan, Nardil, and Parnate  · medicines that relax muscles for surgery  · methylene blue  · nilotinib  · other narcotic medicines for pain or cough  · phenothiazines like chlorpromazine, mesoridazine, prochlorperazine, thioridazine  This list may not describe all possible interactions. Give your health care provider a list of all the medicines, herbs, non-prescription drugs, or dietary supplements you use. Also tell them if you smoke, drink alcohol, or use illegal drugs. Some items may interact with your medicine.  What should I watch for while using this medicine?  Tell your doctor or health care professional if your pain does not go away, if it gets worse, or if you have new or a different type of pain. You may develop tolerance to the medicine. Tolerance means that you will need a higher dose of the medicine for pain relief. Tolerance is normal and is expected if you take this medicine for a long time.  Do not suddenly stop taking your medicine because you may develop a severe reaction. Your body becomes used to the medicine. This does NOT mean you are addicted. Addiction is a behavior related to getting and using a drug for a non-medical reason. If you have pain, you have a medical reason to take pain medicine. Your doctor will tell you how much medicine to take. If your doctor wants you to stop the medicine, the dose will be slowly lowered over time to avoid any side effects.  There are different types of narcotic medicines (opiates). If you take more than one type at the same time or if you are taking another medicine that also causes drowsiness, you may have more side effects. Give your health care provider a list of all medicines you use. Your doctor will tell you how much medicine to take. Do not take  more medicine than directed. Call emergency for help if you have problems breathing or unusual sleepiness.  You may get drowsy or dizzy. Do not drive, use machinery, or do anything that needs mental alertness until you know how the medicine affects you. Do not stand or sit up quickly, especially if you are an older patient. This reduces the risk of dizzy or fainting spells. Alcohol may interfere with the effect of this medicine. Avoid alcoholic drinks.  This medicine will cause constipation. Try to have a bowel movement at least every 2 to 3 days. If you do not have a bowel movement for 3 days, call your doctor or health care professional.  Your mouth may get dry. Chewing sugarless gum or sucking hard candy, and drinking plenty of water may help. Contact your doctor if the problem does not go away or is severe.  What side effects may I notice from receiving this medicine?  Side effects that you should report to your doctor or health care professional as soon as possible:  · allergic reactions like skin rash, itching or hives, swelling of the face, lips, or tongue  · breathing problems  · confusion  · signs and symptoms of low blood pressure like dizziness; feeling faint or lightheaded, falls; unusually weak or tired  · trouble passing urine or change in the amount of urine  · trouble swallowing  Side effects that usually do not require medical attention (report to your doctor or health care professional if they continue or are bothersome):  · constipation  · dry mouth  · nausea, vomiting  · tiredness  This list may not describe all possible side effects. Call your doctor for medical advice about side effects. You may report side effects to FDA at 9-402-FDA-6732.  Where should I keep my medicine?  Keep out of the reach of children. This medicine can be abused. Keep your medicine in a safe place to protect it from theft. Do not share this medicine with anyone. Selling or giving away this medicine is dangerous and  against the law.  Store at room temperature between 15 and 30 degrees C (59 and 86 degrees F). Protect from light. Keep container tightly closed.  This medicine may cause harm and death if it is taken by other adults, children, or pets. Return medicine that has not been used to an official disposal site. Contact the [facility] at 1-416.120.4559 or your [facility] government to find a site. If you cannot return the medicine, flush it down the toilet. Do not use the medicine after the expiration date.  NOTE: This sheet is a summary. It may not cover all possible information. If you have questions about this medicine, talk to your doctor, pharmacist, or health care provider.  © 2020 [employer]vier/[employer] (2018-04-24 16:13:10)

## 2021-02-18 NOTE — CARE PLAN
Problem: Communication  Goal: The ability to communicate needs accurately and effectively will improve  Outcome: PROGRESSING AS EXPECTED   Pt able to communicate needs appropriately to staff. Plan of care discussed to pt. Questions and concerns answered. Pt. Verbalized understanding. Communication board updated.     Problem: Safety  Goal: Will remain free from injury  Outcome: PROGRESSING AS EXPECTED   Pt educated to call when in need. Call light and personal belongings w/n reach. Room free of clutters. Bed locked and in lowest position. Answer call light immediately.     Problem: Infection  Goal: Will remain free from infection  Outcome: PROGRESSING AS EXPECTED   Pt denies chills and remains afebrile. On IV Unasyn q24 hrs.    Problem: Bowel/Gastric:  Goal: Normal bowel function is maintained or improved  Outcome: PROGRESSING AS EXPECTED   Pt taking Zofran for nausea. Reports nausea is controlled at the moment.

## 2021-02-19 LAB
CMV DNA # SERPL NAA+PROBE: <390 CPY/ML
CMV DNA SERPL NAA+PROBE-ACNC: <227 IU/ML
CMV DNA SERPL NAA+PROBE-LOG IU: <2.4 LOG IU/ML
CMV DNA SERPL NAA+PROBE-LOG#: <2.6 LOG CPY/ML
CMV GENE MUT DET ISLT: NOT DETECTED
CMV PCR SOURCE Q4398: NORMAL
HSV DNA SPEC QL NAA+PROBE: NOT DETECTED
SPECIMEN SOURCE: NORMAL
STREP DNASE B TITR SER: <86 U/ML (ref 0–260)

## 2021-02-19 NOTE — PROGRESS NOTES
Telemetry Shift Summary    Rhythm SR  HR Range 75-87  Ectopy none  Measurements 0.16/0.08/0.38        Normal Values  Rhythm SR  HR Range    Measurements 0.12-0.20 / 0.06-0.10  / 0.30-0.52

## 2021-02-19 NOTE — PROGRESS NOTES
Discharge instructions reviewed and questions answered. PIV removed and catheter intact. Pt UTD on flu vaccine. Pt to f/u with Dr. Wong and PCP; pt to call office by tomorrow if she does not hear from their office for f/u appt. Pt wheeled off unit and transported home by mother.

## 2021-02-20 ENCOUNTER — APPOINTMENT (OUTPATIENT)
Dept: ONCOLOGY | Facility: MEDICAL CENTER | Age: 26
End: 2021-02-20
Attending: INTERNAL MEDICINE
Payer: COMMERCIAL

## 2021-02-21 LAB
ANNOTATION COMMENT IMP: NOT DETECTED
B19V DNA SERPL NAA+PROBE-ACNC: <100 IU/ML
B19V DNA SERPL NAA+PROBE-LOG IU: <2 LOG IU/ML
SPECIMEN SOURCE: NORMAL

## 2021-02-22 LAB
GAMMA INTERFERON BACKGROUND BLD IA-ACNC: 0.05 IU/ML
M TB IFN-G BLD-IMP: NEGATIVE
M TB IFN-G CD4+ BCKGRND COR BLD-ACNC: -0.01 IU/ML
MITOGEN IGNF BCKGRD COR BLD-ACNC: 0.68 IU/ML
QFT TB2 - NIL TBQ2: -0.01 IU/ML

## 2021-03-04 ENCOUNTER — APPOINTMENT (OUTPATIENT)
Dept: ONCOLOGY | Facility: MEDICAL CENTER | Age: 26
End: 2021-03-04
Attending: INTERNAL MEDICINE
Payer: COMMERCIAL

## 2021-03-04 ENCOUNTER — ANESTHESIA EVENT (OUTPATIENT)
Dept: SURGERY | Facility: MEDICAL CENTER | Age: 26
DRG: 368 | End: 2021-03-04
Payer: COMMERCIAL

## 2021-03-04 ENCOUNTER — HOSPITAL ENCOUNTER (INPATIENT)
Facility: MEDICAL CENTER | Age: 26
LOS: 2 days | DRG: 368 | End: 2021-03-06
Attending: EMERGENCY MEDICINE | Admitting: INTERNAL MEDICINE
Payer: COMMERCIAL

## 2021-03-04 DIAGNOSIS — M87.00 AVASCULAR NECROSIS (HCC): ICD-10-CM

## 2021-03-04 LAB
ABO GROUP BLD: NORMAL
ALBUMIN SERPL BCP-MCNC: 2.8 G/DL (ref 3.2–4.9)
ALBUMIN/GLOB SERPL: 0.6 G/DL
ALP SERPL-CCNC: 58 U/L (ref 30–99)
ALT SERPL-CCNC: 8 U/L (ref 2–50)
ANION GAP SERPL CALC-SCNC: 9 MMOL/L (ref 7–16)
ANISOCYTOSIS BLD QL SMEAR: ABNORMAL
APTT PPP: 33.4 SEC (ref 24.7–36)
AST SERPL-CCNC: 25 U/L (ref 12–45)
BARCODED ABORH UBTYP: 5100
BARCODED PRD CODE UBPRD: NORMAL
BARCODED UNIT NUM UBUNT: NORMAL
BASOPHILS # BLD AUTO: 0.3 % (ref 0–1.8)
BASOPHILS # BLD: 0.01 K/UL (ref 0–0.12)
BILIRUB SERPL-MCNC: 0.4 MG/DL (ref 0.1–1.5)
BLD GP AB SCN SERPL QL: NORMAL
BUN SERPL-MCNC: 21 MG/DL (ref 8–22)
CALCIUM SERPL-MCNC: 8.3 MG/DL (ref 8.4–10.2)
CHLORIDE SERPL-SCNC: 90 MMOL/L (ref 96–112)
CO2 SERPL-SCNC: 32 MMOL/L (ref 20–33)
COMMENT 1642: NORMAL
COMPONENT R 8504R: NORMAL
CREAT SERPL-MCNC: 4.01 MG/DL (ref 0.5–1.4)
EOSINOPHIL # BLD AUTO: 0.02 K/UL (ref 0–0.51)
EOSINOPHIL NFR BLD: 0.7 % (ref 0–6.9)
ERYTHROCYTE [DISTWIDTH] IN BLOOD BY AUTOMATED COUNT: 63.1 FL (ref 35.9–50)
GLOBULIN SER CALC-MCNC: 4.5 G/DL (ref 1.9–3.5)
GLUCOSE SERPL-MCNC: 84 MG/DL (ref 65–99)
HCG SERPL QL: NEGATIVE
HCT VFR BLD AUTO: 20.7 % (ref 37–47)
HGB BLD-MCNC: 5.9 G/DL (ref 12–16)
HGB BLD-MCNC: 6.6 G/DL (ref 12–16)
HGB BLD-MCNC: 8.5 G/DL (ref 12–16)
HYPOCHROMIA BLD QL SMEAR: ABNORMAL
IMM GRANULOCYTES # BLD AUTO: 0.01 K/UL (ref 0–0.11)
IMM GRANULOCYTES NFR BLD AUTO: 0.3 % (ref 0–0.9)
INR PPP: 1.18 (ref 0.87–1.13)
LIPASE SERPL-CCNC: 26 U/L (ref 7–58)
LYMPHOCYTES # BLD AUTO: 0.39 K/UL (ref 1–4.8)
LYMPHOCYTES NFR BLD: 13.4 % (ref 22–41)
MAGNESIUM SERPL-MCNC: 1.6 MG/DL (ref 1.5–2.5)
MCH RBC QN AUTO: 25.2 PG (ref 27–33)
MCHC RBC AUTO-ENTMCNC: 28.5 G/DL (ref 33.6–35)
MCV RBC AUTO: 88.5 FL (ref 81.4–97.8)
MICROCYTES BLD QL SMEAR: ABNORMAL
MONOCYTES # BLD AUTO: 0.23 K/UL (ref 0–0.85)
MONOCYTES NFR BLD AUTO: 7.9 % (ref 0–13.4)
NEUTROPHILS # BLD AUTO: 2.25 K/UL (ref 2–7.15)
NEUTROPHILS NFR BLD: 77.4 % (ref 44–72)
NRBC # BLD AUTO: 0 K/UL
NRBC BLD-RTO: 0 /100 WBC
OVALOCYTES BLD QL SMEAR: NORMAL
PHOSPHATE SERPL-MCNC: 2.5 MG/DL (ref 2.5–4.5)
PLATELET # BLD AUTO: 89 K/UL (ref 164–446)
PLATELET BLD QL SMEAR: NORMAL
PMV BLD AUTO: 10.8 FL (ref 9–12.9)
POIKILOCYTOSIS BLD QL SMEAR: NORMAL
POLYCHROMASIA BLD QL SMEAR: NORMAL
POTASSIUM SERPL-SCNC: 3.7 MMOL/L (ref 3.6–5.5)
PRODUCT TYPE UPROD: NORMAL
PROT SERPL-MCNC: 7.3 G/DL (ref 6–8.2)
PROTHROMBIN TIME: 14.7 SEC (ref 12–14.6)
RBC # BLD AUTO: 2.34 M/UL (ref 4.2–5.4)
RBC BLD AUTO: PRESENT
RH BLD: NORMAL
SARS-COV+SARS-COV-2 AG RESP QL IA.RAPID: NOTDETECTED
SARS-COV-2 RNA RESP QL NAA+PROBE: NOTDETECTED
SODIUM SERPL-SCNC: 131 MMOL/L (ref 135–145)
SPECIMEN SOURCE: NORMAL
SPECIMEN SOURCE: NORMAL
UNIT STATUS USTAT: NORMAL
WBC # BLD AUTO: 2.9 K/UL (ref 4.8–10.8)

## 2021-03-04 PROCEDURE — 96375 TX/PRO/DX INJ NEW DRUG ADDON: CPT

## 2021-03-04 PROCEDURE — U0005 INFEC AGEN DETEC AMPLI PROBE: HCPCS

## 2021-03-04 PROCEDURE — 700111 HCHG RX REV CODE 636 W/ 250 OVERRIDE (IP): Performed by: EMERGENCY MEDICINE

## 2021-03-04 PROCEDURE — A9270 NON-COVERED ITEM OR SERVICE: HCPCS | Performed by: INTERNAL MEDICINE

## 2021-03-04 PROCEDURE — 85610 PROTHROMBIN TIME: CPT

## 2021-03-04 PROCEDURE — 700111 HCHG RX REV CODE 636 W/ 250 OVERRIDE (IP)

## 2021-03-04 PROCEDURE — 87426 SARSCOV CORONAVIRUS AG IA: CPT

## 2021-03-04 PROCEDURE — 83690 ASSAY OF LIPASE: CPT

## 2021-03-04 PROCEDURE — 86900 BLOOD TYPING SEROLOGIC ABO: CPT

## 2021-03-04 PROCEDURE — A9270 NON-COVERED ITEM OR SERVICE: HCPCS | Performed by: EMERGENCY MEDICINE

## 2021-03-04 PROCEDURE — 700111 HCHG RX REV CODE 636 W/ 250 OVERRIDE (IP): Performed by: STUDENT IN AN ORGANIZED HEALTH CARE EDUCATION/TRAINING PROGRAM

## 2021-03-04 PROCEDURE — 99285 EMERGENCY DEPT VISIT HI MDM: CPT

## 2021-03-04 PROCEDURE — 700102 HCHG RX REV CODE 250 W/ 637 OVERRIDE(OP): Performed by: EMERGENCY MEDICINE

## 2021-03-04 PROCEDURE — 99255 IP/OBS CONSLTJ NEW/EST HI 80: CPT | Performed by: INTERNAL MEDICINE

## 2021-03-04 PROCEDURE — 700105 HCHG RX REV CODE 258

## 2021-03-04 PROCEDURE — 85730 THROMBOPLASTIN TIME PARTIAL: CPT

## 2021-03-04 PROCEDURE — 30233N1 TRANSFUSION OF NONAUTOLOGOUS RED BLOOD CELLS INTO PERIPHERAL VEIN, PERCUTANEOUS APPROACH: ICD-10-PCS | Performed by: EMERGENCY MEDICINE

## 2021-03-04 PROCEDURE — 700111 HCHG RX REV CODE 636 W/ 250 OVERRIDE (IP): Performed by: INTERNAL MEDICINE

## 2021-03-04 PROCEDURE — A9270 NON-COVERED ITEM OR SERVICE: HCPCS | Performed by: STUDENT IN AN ORGANIZED HEALTH CARE EDUCATION/TRAINING PROGRAM

## 2021-03-04 PROCEDURE — 86901 BLOOD TYPING SEROLOGIC RH(D): CPT

## 2021-03-04 PROCEDURE — 700105 HCHG RX REV CODE 258: Performed by: EMERGENCY MEDICINE

## 2021-03-04 PROCEDURE — C9803 HOPD COVID-19 SPEC COLLECT: HCPCS | Performed by: INTERNAL MEDICINE

## 2021-03-04 PROCEDURE — P9016 RBC LEUKOCYTES REDUCED: HCPCS

## 2021-03-04 PROCEDURE — 770020 HCHG ROOM/CARE - TELE (206)

## 2021-03-04 PROCEDURE — 86850 RBC ANTIBODY SCREEN: CPT

## 2021-03-04 PROCEDURE — C9113 INJ PANTOPRAZOLE SODIUM, VIA: HCPCS

## 2021-03-04 PROCEDURE — 85025 COMPLETE CBC W/AUTO DIFF WBC: CPT

## 2021-03-04 PROCEDURE — 700102 HCHG RX REV CODE 250 W/ 637 OVERRIDE(OP): Performed by: INTERNAL MEDICINE

## 2021-03-04 PROCEDURE — 700105 HCHG RX REV CODE 258: Performed by: INTERNAL MEDICINE

## 2021-03-04 PROCEDURE — 84703 CHORIONIC GONADOTROPIN ASSAY: CPT

## 2021-03-04 PROCEDURE — C9113 INJ PANTOPRAZOLE SODIUM, VIA: HCPCS | Performed by: INTERNAL MEDICINE

## 2021-03-04 PROCEDURE — 83735 ASSAY OF MAGNESIUM: CPT

## 2021-03-04 PROCEDURE — 99223 1ST HOSP IP/OBS HIGH 75: CPT | Performed by: INTERNAL MEDICINE

## 2021-03-04 PROCEDURE — 700102 HCHG RX REV CODE 250 W/ 637 OVERRIDE(OP): Performed by: STUDENT IN AN ORGANIZED HEALTH CARE EDUCATION/TRAINING PROGRAM

## 2021-03-04 PROCEDURE — 86902 BLOOD TYPE ANTIGEN DONOR EA: CPT | Mod: 91

## 2021-03-04 PROCEDURE — 80053 COMPREHEN METABOLIC PANEL: CPT

## 2021-03-04 PROCEDURE — U0003 INFECTIOUS AGENT DETECTION BY NUCLEIC ACID (DNA OR RNA); SEVERE ACUTE RESPIRATORY SYNDROME CORONAVIRUS 2 (SARS-COV-2) (CORONAVIRUS DISEASE [COVID-19]), AMPLIFIED PROBE TECHNIQUE, MAKING USE OF HIGH THROUGHPUT TECHNOLOGIES AS DESCRIBED BY CMS-2020-01-R: HCPCS

## 2021-03-04 PROCEDURE — 85018 HEMOGLOBIN: CPT

## 2021-03-04 PROCEDURE — 86922 COMPATIBILITY TEST ANTIGLOB: CPT

## 2021-03-04 PROCEDURE — 96374 THER/PROPH/DIAG INJ IV PUSH: CPT

## 2021-03-04 PROCEDURE — 36430 TRANSFUSION BLD/BLD COMPNT: CPT

## 2021-03-04 PROCEDURE — 84100 ASSAY OF PHOSPHORUS: CPT

## 2021-03-04 RX ORDER — AMLODIPINE BESYLATE 5 MG/1
10 TABLET ORAL EVERY EVENING
Status: DISCONTINUED | OUTPATIENT
Start: 2021-03-04 | End: 2021-03-06 | Stop reason: HOSPADM

## 2021-03-04 RX ORDER — BISACODYL 10 MG
10 SUPPOSITORY, RECTAL RECTAL
Status: DISCONTINUED | OUTPATIENT
Start: 2021-03-04 | End: 2021-03-06 | Stop reason: HOSPADM

## 2021-03-04 RX ORDER — PANTOPRAZOLE SODIUM 40 MG/10ML
INJECTION, POWDER, LYOPHILIZED, FOR SOLUTION INTRAVENOUS
Status: ACTIVE
Start: 2021-03-04 | End: 2021-03-04

## 2021-03-04 RX ORDER — ATENOLOL 25 MG/1
25 TABLET ORAL EVERY EVENING
Status: DISCONTINUED | OUTPATIENT
Start: 2021-03-04 | End: 2021-03-06

## 2021-03-04 RX ORDER — ACETAMINOPHEN 500 MG
500 TABLET ORAL EVERY 6 HOURS PRN
Status: DISCONTINUED | OUTPATIENT
Start: 2021-03-04 | End: 2021-03-06 | Stop reason: HOSPADM

## 2021-03-04 RX ORDER — ONDANSETRON 2 MG/ML
4 INJECTION INTRAMUSCULAR; INTRAVENOUS ONCE
Status: COMPLETED | OUTPATIENT
Start: 2021-03-04 | End: 2021-03-04

## 2021-03-04 RX ORDER — PANTOPRAZOLE SODIUM 40 MG/10ML
INJECTION, POWDER, LYOPHILIZED, FOR SOLUTION INTRAVENOUS
Status: COMPLETED
Start: 2021-03-04 | End: 2021-03-04

## 2021-03-04 RX ORDER — DIPHENHYDRAMINE HYDROCHLORIDE 50 MG/ML
50 INJECTION INTRAMUSCULAR; INTRAVENOUS ONCE
Status: COMPLETED | OUTPATIENT
Start: 2021-03-04 | End: 2021-03-04

## 2021-03-04 RX ORDER — MYCOPHENOLIC ACID 180 MG/1
180 TABLET, DELAYED RELEASE ORAL EVERY EVENING
Status: DISCONTINUED | OUTPATIENT
Start: 2021-03-04 | End: 2021-03-04

## 2021-03-04 RX ORDER — ONDANSETRON 2 MG/ML
4 INJECTION INTRAMUSCULAR; INTRAVENOUS EVERY 4 HOURS PRN
Status: DISCONTINUED | OUTPATIENT
Start: 2021-03-04 | End: 2021-03-06 | Stop reason: HOSPADM

## 2021-03-04 RX ORDER — PROMETHAZINE HYDROCHLORIDE 25 MG/1
12.5-25 TABLET ORAL EVERY 4 HOURS PRN
Status: DISCONTINUED | OUTPATIENT
Start: 2021-03-04 | End: 2021-03-06 | Stop reason: HOSPADM

## 2021-03-04 RX ORDER — POLYETHYLENE GLYCOL 3350 17 G/17G
1 POWDER, FOR SOLUTION ORAL
Status: DISCONTINUED | OUTPATIENT
Start: 2021-03-04 | End: 2021-03-06 | Stop reason: HOSPADM

## 2021-03-04 RX ORDER — PROMETHAZINE HYDROCHLORIDE 25 MG/1
12.5-25 SUPPOSITORY RECTAL EVERY 4 HOURS PRN
Status: DISCONTINUED | OUTPATIENT
Start: 2021-03-04 | End: 2021-03-06 | Stop reason: HOSPADM

## 2021-03-04 RX ORDER — DIPHENHYDRAMINE HYDROCHLORIDE 50 MG/ML
25 INJECTION INTRAMUSCULAR; INTRAVENOUS EVERY 6 HOURS PRN
Status: DISCONTINUED | OUTPATIENT
Start: 2021-03-04 | End: 2021-03-06 | Stop reason: HOSPADM

## 2021-03-04 RX ORDER — CARVEDILOL 25 MG/1
25 TABLET ORAL 2 TIMES DAILY WITH MEALS
Status: DISCONTINUED | OUTPATIENT
Start: 2021-03-04 | End: 2021-03-04

## 2021-03-04 RX ORDER — ENALAPRILAT 1.25 MG/ML
1.25 INJECTION INTRAVENOUS EVERY 6 HOURS PRN
Status: DISCONTINUED | OUTPATIENT
Start: 2021-03-04 | End: 2021-03-06 | Stop reason: HOSPADM

## 2021-03-04 RX ORDER — SODIUM CHLORIDE 9 MG/ML
INJECTION, SOLUTION INTRAVENOUS CONTINUOUS
Status: ACTIVE | OUTPATIENT
Start: 2021-03-04 | End: 2021-03-04

## 2021-03-04 RX ORDER — ACETAMINOPHEN 325 MG/1
650 TABLET ORAL ONCE
Status: COMPLETED | OUTPATIENT
Start: 2021-03-04 | End: 2021-03-04

## 2021-03-04 RX ORDER — PANTOPRAZOLE SODIUM 40 MG/10ML
40 INJECTION, POWDER, LYOPHILIZED, FOR SOLUTION INTRAVENOUS 2 TIMES DAILY
Status: DISCONTINUED | OUTPATIENT
Start: 2021-03-04 | End: 2021-03-04

## 2021-03-04 RX ORDER — LOSARTAN POTASSIUM 25 MG/1
100 TABLET ORAL
Status: DISCONTINUED | OUTPATIENT
Start: 2021-03-04 | End: 2021-03-06 | Stop reason: HOSPADM

## 2021-03-04 RX ORDER — ONDANSETRON 4 MG/1
4 TABLET, ORALLY DISINTEGRATING ORAL EVERY 4 HOURS PRN
Status: DISCONTINUED | OUTPATIENT
Start: 2021-03-04 | End: 2021-03-06 | Stop reason: HOSPADM

## 2021-03-04 RX ORDER — SODIUM CHLORIDE 9 MG/ML
INJECTION, SOLUTION INTRAVENOUS
Status: COMPLETED
Start: 2021-03-04 | End: 2021-03-04

## 2021-03-04 RX ORDER — SODIUM CHLORIDE 9 MG/ML
250 INJECTION, SOLUTION INTRAVENOUS
Status: DISCONTINUED | OUTPATIENT
Start: 2021-03-04 | End: 2021-03-06 | Stop reason: HOSPADM

## 2021-03-04 RX ORDER — HYDROXYCHLOROQUINE SULFATE 200 MG/1
200 TABLET, FILM COATED ORAL EVERY EVENING
Status: DISCONTINUED | OUTPATIENT
Start: 2021-03-04 | End: 2021-03-06 | Stop reason: HOSPADM

## 2021-03-04 RX ORDER — AMOXICILLIN 250 MG
2 CAPSULE ORAL 2 TIMES DAILY
Status: DISCONTINUED | OUTPATIENT
Start: 2021-03-04 | End: 2021-03-06 | Stop reason: HOSPADM

## 2021-03-04 RX ORDER — PROCHLORPERAZINE EDISYLATE 5 MG/ML
5-10 INJECTION INTRAMUSCULAR; INTRAVENOUS EVERY 4 HOURS PRN
Status: DISCONTINUED | OUTPATIENT
Start: 2021-03-04 | End: 2021-03-06 | Stop reason: HOSPADM

## 2021-03-04 RX ORDER — LORAZEPAM 2 MG/ML
1 INJECTION INTRAMUSCULAR ONCE
Status: COMPLETED | OUTPATIENT
Start: 2021-03-04 | End: 2021-03-04

## 2021-03-04 RX ORDER — AMLODIPINE BESYLATE 10 MG/1
10 TABLET ORAL EVERY EVENING
Qty: 90 TABLET | Refills: 3 | Status: SHIPPED | OUTPATIENT
Start: 2021-03-04 | End: 2021-10-31

## 2021-03-04 RX ORDER — MORPHINE SULFATE 4 MG/ML
4 INJECTION, SOLUTION INTRAMUSCULAR; INTRAVENOUS
Status: COMPLETED | OUTPATIENT
Start: 2021-03-04 | End: 2021-03-04

## 2021-03-04 RX ORDER — ALPRAZOLAM 0.25 MG/1
0.25 TABLET ORAL 2 TIMES DAILY PRN
Status: DISCONTINUED | OUTPATIENT
Start: 2021-03-04 | End: 2021-03-06 | Stop reason: HOSPADM

## 2021-03-04 RX ADMIN — PANTOPRAZOLE SODIUM 80 MG: 40 INJECTION, POWDER, LYOPHILIZED, FOR SOLUTION INTRAVENOUS at 02:02

## 2021-03-04 RX ADMIN — SENNOSIDES AND DOCUSATE SODIUM 2 TABLET: 8.6; 5 TABLET ORAL at 17:34

## 2021-03-04 RX ADMIN — ACETAMINOPHEN 500 MG: 500 TABLET, FILM COATED ORAL at 17:07

## 2021-03-04 RX ADMIN — SODIUM CHLORIDE 100 ML: 900 INJECTION INTRAVENOUS at 02:03

## 2021-03-04 RX ADMIN — ATENOLOL 25 MG: 25 TABLET ORAL at 17:32

## 2021-03-04 RX ADMIN — MORPHINE SULFATE 4 MG: 4 INJECTION INTRAVENOUS at 01:59

## 2021-03-04 RX ADMIN — SODIUM CHLORIDE 8 MG/HR: 9 INJECTION, SOLUTION INTRAVENOUS at 11:35

## 2021-03-04 RX ADMIN — MORPHINE SULFATE 4 MG: 4 INJECTION INTRAVENOUS at 05:13

## 2021-03-04 RX ADMIN — ONDANSETRON 4 MG: 2 INJECTION INTRAMUSCULAR; INTRAVENOUS at 02:01

## 2021-03-04 RX ADMIN — DIPHENHYDRAMINE HYDROCHLORIDE 50 MG: 50 INJECTION, SOLUTION INTRAMUSCULAR; INTRAVENOUS at 07:27

## 2021-03-04 RX ADMIN — DIPHENHYDRAMINE HYDROCHLORIDE 25 MG: 50 INJECTION, SOLUTION INTRAMUSCULAR; INTRAVENOUS at 17:07

## 2021-03-04 RX ADMIN — ALPRAZOLAM 0.25 MG: 0.25 TABLET ORAL at 21:38

## 2021-03-04 RX ADMIN — LORAZEPAM 1 MG: 2 INJECTION INTRAMUSCULAR; INTRAVENOUS at 01:57

## 2021-03-04 RX ADMIN — SODIUM CHLORIDE: 9 INJECTION, SOLUTION INTRAVENOUS at 07:38

## 2021-03-04 RX ADMIN — HYDROXYCHLOROQUINE SULFATE 200 MG: 200 TABLET, FILM COATED ORAL at 17:33

## 2021-03-04 RX ADMIN — ONDANSETRON HYDROCHLORIDE 4 MG: 2 SOLUTION INTRAMUSCULAR; INTRAVENOUS at 21:33

## 2021-03-04 RX ADMIN — MORPHINE SULFATE 4 MG: 4 INJECTION INTRAVENOUS at 16:11

## 2021-03-04 RX ADMIN — AMLODIPINE BESYLATE 10 MG: 5 TABLET ORAL at 17:33

## 2021-03-04 RX ADMIN — ACETAMINOPHEN 650 MG: 325 TABLET, FILM COATED ORAL at 07:26

## 2021-03-04 RX ADMIN — ONDANSETRON HYDROCHLORIDE 4 MG: 2 SOLUTION INTRAMUSCULAR; INTRAVENOUS at 16:11

## 2021-03-04 RX ADMIN — ONDANSETRON HYDROCHLORIDE 4 MG: 2 SOLUTION INTRAMUSCULAR; INTRAVENOUS at 05:13

## 2021-03-04 ASSESSMENT — ENCOUNTER SYMPTOMS
CONSTIPATION: 0
NERVOUS/ANXIOUS: 0
STRIDOR: 0
FEVER: 0
BACK PAIN: 0
EYES NEGATIVE: 1
DEPRESSION: 0
SHORTNESS OF BREATH: 0
INSOMNIA: 0
POLYDIPSIA: 0
CARDIOVASCULAR NEGATIVE: 1
ABDOMINAL PAIN: 1
ABDOMINAL PAIN: 0
SEIZURES: 0
PALPITATIONS: 0
VOMITING: 1
LOSS OF CONSCIOUSNESS: 0
DIARRHEA: 0
SPUTUM PRODUCTION: 0
BRUISES/BLEEDS EASILY: 1
CHILLS: 0
EYE REDNESS: 0
EYE DISCHARGE: 0
DEPRESSION: 1
TINGLING: 0
WEAKNESS: 0
HEADACHES: 0
NEUROLOGICAL NEGATIVE: 1
RESPIRATORY NEGATIVE: 1
SORE THROAT: 0
EYE PAIN: 0
WHEEZING: 0
MYALGIAS: 0
FLANK PAIN: 0
SINUS PAIN: 0
ROS GI COMMENTS: HEMATEMESIS
COUGH: 0
FALLS: 0
BLOOD IN STOOL: 0
DIZZINESS: 0
TREMORS: 0
NAUSEA: 1

## 2021-03-04 ASSESSMENT — COGNITIVE AND FUNCTIONAL STATUS - GENERAL
MOBILITY SCORE: 23
DAILY ACTIVITIY SCORE: 24
CLIMB 3 TO 5 STEPS WITH RAILING: A LITTLE
SUGGESTED CMS G CODE MODIFIER MOBILITY: CI
SUGGESTED CMS G CODE MODIFIER DAILY ACTIVITY: CH

## 2021-03-04 ASSESSMENT — LIFESTYLE VARIABLES
CONSUMPTION TOTAL: NEGATIVE
HAVE PEOPLE ANNOYED YOU BY CRITICIZING YOUR DRINKING: NO
HOW MANY TIMES IN THE PAST YEAR HAVE YOU HAD 5 OR MORE DRINKS IN A DAY: 0
EVER HAD A DRINK FIRST THING IN THE MORNING TO STEADY YOUR NERVES TO GET RID OF A HANGOVER: NO
ON A TYPICAL DAY WHEN YOU DRINK ALCOHOL HOW MANY DRINKS DO YOU HAVE: 0
TOTAL SCORE: 0
TOTAL SCORE: 0
HAVE YOU EVER FELT YOU SHOULD CUT DOWN ON YOUR DRINKING: NO
AVERAGE NUMBER OF DAYS PER WEEK YOU HAVE A DRINK CONTAINING ALCOHOL: 0
TOTAL SCORE: 0
ALCOHOL_USE: NO
EVER FELT BAD OR GUILTY ABOUT YOUR DRINKING: NO

## 2021-03-04 ASSESSMENT — PAIN DESCRIPTION - PAIN TYPE
TYPE: ACUTE PAIN
TYPE: ACUTE PAIN

## 2021-03-04 ASSESSMENT — PAIN DESCRIPTION - DESCRIPTORS: DESCRIPTORS: PRESSURE;SHARP

## 2021-03-04 ASSESSMENT — FIBROSIS 4 INDEX
FIB4 SCORE: 1.54
FIB4 SCORE: 2.28

## 2021-03-04 NOTE — H&P
Hospital Medicine History & Physical Note    Date of Service  3/4/2021    Primary Care Physician  Ella Matthew M.D.    Consultants  None    Code Status  Full Code    Chief Complaint  Chief Complaint   Patient presents with   • Vomiting Blood     x2 episodes, dark brown in color   • Abdominal Pain   • Nausea       History of Presenting Illness  25 y.o. female who presented 3/4/2021 with hematemesis.  Patient states whenever she woke up this morning she had nausea.  She states she was able to go to her hemodialysis and complete this.  She states earlier this afternoon she had vomiting with small amount of blood noted in it.  Patient continued to be nauseous and vomited again, again with blood in her vomit.  Because of this, she presented to the emergency department.  She does have a history of hemorrhagic gastritis, most recently with EGD in November by Dr. Whitney. Patient states she was recently switched to Protonix and has been compliant with this, she states she has also been compliant with her Carafate.  Patient does receive dialysis Monday, Wednesday and Friday.  I did discuss the case including labs with the ER physician.    Review of Systems  Review of Systems   Constitutional: Negative for chills, fever and malaise/fatigue.   HENT: Negative for congestion.    Respiratory: Negative for cough, sputum production, shortness of breath and stridor.    Cardiovascular: Negative for chest pain, palpitations and leg swelling.   Gastrointestinal: Positive for nausea and vomiting. Negative for abdominal pain, constipation and diarrhea.        Hematemesis    Genitourinary: Negative for dysuria and urgency.   Musculoskeletal: Negative for falls and myalgias.   Neurological: Negative for dizziness, tingling, loss of consciousness, weakness and headaches.   Psychiatric/Behavioral: Negative for depression and suicidal ideas.   All other systems reviewed and are negative.      Past Medical History   has a past medical  history of Anemia (01/17/2018), AVF (arteriovenous fistula) ([facility]), Dialysis patient ([facility]), ESRD (end stage renal disease) on dialysis ([facility]) (01/17/2018), Heart burn, Hypertension (01/17/2018), Indigestion, Lupus ([facility]), Migraines (01/17/2018), and Seizure ([facility]) (2013).    Surgical History   has a past surgical history that includes ronak by laparoscopy (4/5/2010); av fistula creation (Right); angioplasty (01/17/2018); other; other; gastroscopy-endo (12/9/2019); gastroscopy (N/A, 5/30/2020); gastroscopy-endo (9/18/2020); pr upper gi endoscopy,ctrl bleed (11/12/2020); pr upper gi endoscopy,biopsy (11/12/2020); gastroscopy-endo (11/12/2020); pr colonoscopy,diagnostic (1/8/2021); pr upper gi endoscopy,diagnosis (1/8/2021); and pr upper gi endoscopy,ctrl bleed (1/8/2021).     Family History  family history includes Diabetes in her paternal grandmother.     Social History   reports that she has never smoked. She has never used smokeless tobacco. She reports that she does not drink alcohol and does not use drugs.    Allergies  Allergies   Allergen Reactions   • Cephalexin Rash     .   • Clindamycin Rash     .   • Methylprednisolone Unspecified     Anxious   • Metoprolol Rash     .       Medications  Prior to Admission Medications   Prescriptions Last Dose Informant Patient Reported? Taking?   acetaminophen (TYLENOL) 500 MG Tab  Patient Yes No   Sig: Take 500 mg by mouth every 6 hours as needed for Moderate Pain.   amLODIPine (NORVASC) 10 MG Tab  Patient Yes No   Sig: Take 10 mg by mouth every evening.   atenolol (TENORMIN) 25 MG Tab  Patient Yes No   Sig: Take 25 mg by mouth every evening.   furosemide (LASIX) 40 MG Tab  Patient Yes No   Sig: Take 40 mg by mouth 2 (two) times a day.   hydroxychloroquine (PLAQUENIL) 200 MG Tab  Patient Yes No   Sig: Take 200 mg by mouth every evening.   mycophenolate (MYFORTIC) 180 MG EC tablet  Patient No No   Sig: Take 1 Tab by mouth every evening.   ondansetron (ZOFRAN ODT) 4 MG TABLET  DISPERSIBLE   No No   Sig: Take 1 tablet by mouth every four hours as needed for Nausea (give PO if no IV route available).   pantoprazole (PROTONIX) 40 MG Tablet Delayed Response  Patient No No   Sig: Take 1 Tab by mouth 2 times a day.   predniSONE (DELTASONE) 5 MG Tab  Patient Yes No   Sig: Take 5 mg by mouth every evening.   sucralfate (CARAFATE) 1 GM/10ML Suspension  Patient No No   Sig: Take 10 mL by mouth 4 Times a Day,Before Meals and at Bedtime.      Facility-Administered Medications: None       Physical Exam  Temp:  [36.9 °C (98.4 °F)] 36.9 °C (98.4 °F)  Pulse:  [103-123] 103  Resp:  [14] 14  BP: (149-168)/() 149/99  SpO2:  [95 %-100 %] 100 %    Physical Exam  Vitals and nursing note reviewed.   Constitutional:       General: She is not in acute distress.     Appearance: She is well-developed. She is not diaphoretic.   HENT:      Head: Normocephalic and atraumatic.      Right Ear: External ear normal.      Left Ear: External ear normal.      Nose: Nose normal. No congestion or rhinorrhea.      Mouth/Throat:      Mouth: Mucous membranes are moist.      Pharynx: No oropharyngeal exudate.   Eyes:      General:         Right eye: No discharge.         Left eye: No discharge.      Extraocular Movements: Extraocular movements intact.   Neck:      Trachea: No tracheal deviation.   Cardiovascular:      Rate and Rhythm: Normal rate and regular rhythm.      Heart sounds: No murmur. No friction rub. No gallop.    Pulmonary:      Effort: Pulmonary effort is normal. No respiratory distress.      Breath sounds: Normal breath sounds. No stridor. No wheezing or rales.   Chest:      Chest wall: No tenderness.   Abdominal:      General: Bowel sounds are normal. There is no distension.      Palpations: Abdomen is soft.      Tenderness: There is no abdominal tenderness.   Musculoskeletal:         General: No tenderness. Normal range of motion.      Cervical back: Normal range of motion and neck supple.      Right lower  leg: No edema.      Left lower leg: No edema.   Lymphadenopathy:      Cervical: No cervical adenopathy.   Skin:     General: Skin is warm and dry.      Coloration: Skin is not jaundiced.      Findings: No erythema or rash.   Neurological:      General: No focal deficit present.      Mental Status: She is alert and oriented to person, place, and time.      Cranial Nerves: No cranial nerve deficit.   Psychiatric:         Mood and Affect: Mood normal.         Behavior: Behavior normal.         Thought Content: Thought content normal.         Judgment: Judgment normal.         Laboratory:  Recent Labs     03/04/21 0147   WBC 2.9*   RBC 2.34*   HEMOGLOBIN 5.9*   HEMATOCRIT 20.7*   MCV 88.5   MCH 25.2*   MCHC 28.5*   RDW 63.1*   PLATELETCT 89*   MPV 10.8     Recent Labs     03/04/21 0147   SODIUM 131*   POTASSIUM 3.7   CHLORIDE 90*   CO2 32   GLUCOSE 84   BUN 21   CREATININE 4.01*   CALCIUM 8.3*     Recent Labs     03/04/21 0147   ALTSGPT 8   ASTSGOT 25   ALKPHOSPHAT 58   TBILIRUBIN 0.4   LIPASE 26   GLUCOSE 84     Recent Labs     03/04/21 0147   APTT 33.4   INR 1.18*     No results for input(s): NTPROBNP in the last 72 hours.      No results for input(s): TROPONINT in the last 72 hours.    Imaging:  No orders to display         Assessment/Plan:  I anticipate this patient will require at least two midnights for appropriate medical management, necessitating inpatient admission.    GIB (gastrointestinal bleeding)- (present on admission)  Assessment & Plan  -Upper GI bleed, bright red blood in the vomitus  -In November she did receive an EGD, did have hemorrhagic gastritis which was treated with argon plasma coagulation by Dr. Whitney  -1 unit of packed red blood cells is currently pending, repeat hemoglobin after, continue to transfuse for hemoglobin less than 7  -Otherwise obtain hemoglobin every 8 hours  -She will likely need GI consultation however none will be called at this time, she remains hemodynamically stable  and does not need an urgent procedure  -She is on prednisone at baseline, will hold steroid in case this is causing/worsening her gastritis  -Profound anemia at baseline due to chronic renal failure however she has lost greater than a point from her baseline    Acute on chronic anemia- (present on admission)  Assessment & Plan  -Acute worsening due to GI bleeding, 1 unit of PRBCs is pending, hemoglobin will repeated posttransfusion, continue to transfuse for hemoglobin less than 7  She does have pancytopenia, white blood cells and platelets are close to her baseline  -Repeat CBC in the morning    Lupus ([facility])- (present on admission)  Assessment & Plan  -Continue home Plaquenil mycophenolate  -Hold home prednisone until gastritis is improved  -No acute flare    ESRD (end stage renal disease) on dialysis ([facility])- (present on admission)  Assessment & Plan  -She does get hemodialysis Monday, Wednesday and Friday, she did successfully complete hemodialysis today  -If she will still be in the ER on Friday or if urgent hemodialysis required, consult nephrology but no consult has been called at this time

## 2021-03-04 NOTE — PROGRESS NOTES
Received patient from the Emergency Department. Patient ambulated from the [facility] to the hospital bed with standby assist. Patient is alert and oriented X 4. Safety precautions in place. Will continue to monitor patient.

## 2021-03-04 NOTE — ASSESSMENT & PLAN NOTE
-Upper GI bleed, bright red blood in the vomitus  -In November she did receive an EGD, did have hemorrhagic gastritis which was treated with argon plasma coagulation by Dr. Whitney  -Profound anemia at baseline due to chronic renal failure however she has lost greater than a point from her baseline  EGD 3/5 shows Karen Keyes tear, hemostasis achieved during EGD, as well as gastroparesis. Biopsies taken.  On PPI and reglan.  Follow up with GI in 2-4 weeks.  Monitor H/H, transfuse for Hgb<7.

## 2021-03-04 NOTE — CONSULTS
"Nephrology Consultation    Date of Service  3/4/2021    Referring Physician  Jorge Carroll M.D.    Consulting Physician  Navin Metz M.D.    Reason for Consultation  Management of ESRD on HD      History of Presenting Illness  25 y.o. female with lupus, ESRD on dialysis Monday Wednesday Friday who presented 3/4/2021 with hematemesis.    The patient attended her usual dialysis treatment yesterday on Wednesday.  She had 1 L removed.  When she went home, around 5 PM, she vomited bright red blood.  She complains of accompanying abdominal pain.  She denies taking NSAIDs.  She denies blood per rectum.  This is happened to the patient before, last time in November.  The patient is on chronic low-dose prednisone 5 mg daily for her active extrarenal lupus.  She is following with rheumatology, Dr. Wong, and there are plans to start Benlysta soon.  When the patient had hematemesis, she came into the emergency room.    Of note, the patient does have chronic anemia, and she is managed with Epogen 12,000 units per dialysis treatment.  The patient has been on dialysis for 7 years.  The lupus caused her kidney failure.  The patient is oliguric.  She is listed for transplant at [facility].    Review of Systems  Review of Systems   Constitutional: Negative for fever.   Respiratory: Negative for shortness of breath.    Cardiovascular: Negative for chest pain.   Gastrointestinal: Positive for abdominal pain and vomiting.   All other systems reviewed and are negative.      Past Medical History  Past Medical History:   Diagnosis Date   • Anemia 01/17/2018   • AVF (arteriovenous fistula) ([facility])     Right Arm   • Dialysis patient ([facility])      dialysis, M,W,F Elizabeth/Joni   • ESRD (end stage renal disease) on dialysis ([facility]) 01/17/2018    Twan Fu   • Heart burn    • Hypertension 01/17/2018    \"Controlled with medication\"   • Indigestion    • Lupus ([facility])    • Migraines 01/17/2018   • Seizure ([facility]) 2013    from high blood pressure, reports 1 time " "event       Surgical History  Past Surgical History:   Procedure Laterality Date   • PB COLONOSCOPY,DIAGNOSTIC  1/8/2021    Procedure: COLONOSCOPY;  Surgeon: Herbert Contreras M.D.;  Location: [facility];  Service: Gastroenterology   • PB UPPER GI ENDOSCOPY,DIAGNOSIS  1/8/2021    Procedure: GASTROSCOPY;  Surgeon: Herbert Contreras M.D.;  Location: [facility];  Service: Gastroenterology   • PB UPPER GI ENDOSCOPY,CTRL BLEED  1/8/2021    Procedure: GASTROSCOPY, WITH ARGON PLASMA COAGULATION;  Surgeon: Herbert Contreras M.D.;  Location: [facility];  Service: Gastroenterology   • PB UPPER GI ENDOSCOPY,CTRL BLEED  11/12/2020    Procedure: GASTROSCOPY, WITH ARGON PLASMA COAGULATION;  Surgeon: Gadiel Whitney M.D.;  Location: [facility];  Service: Gastroenterology   • PB UPPER GI ENDOSCOPY,BIOPSY  11/12/2020    Procedure: GASTROSCOPY, WITH BIOPSY;  Surgeon: Gadiel Whitney M.D.;  Location: [facility];  Service: Gastroenterology   • GASTROSCOPY-ENDO  11/12/2020    Procedure: GASTROSCOPY;  Surgeon: Gadiel Whitney M.D.;  Location: [facility];  Service: Gastroenterology   • GASTROSCOPY-ENDO  9/18/2020    Procedure: GASTROSCOPY;  Surgeon: Gadiel Whitney M.D.;  Location: [facility];  Service: Gastroenterology   • GASTROSCOPY N/A 5/30/2020    Procedure: GASTROSCOPY;  Surgeon: Waylon Mcqueen M.D.;  Location: [facility];  Service: Gastroenterology   • GASTROSCOPY-ENDO  12/9/2019    Procedure: GASTROSCOPY;  Surgeon: Aaron Kam M.D.;  Location: [facility];  Service: Gastroenterology   • ANGIOPLASTY  01/17/2018    \"Right Arm AV-Fistulagram & Angioplastyx3\"   • ULI BY LAPAROSCOPY  4/5/2010    Performed by SYED MARTELL at SURGERY [facility]S   • AV FISTULA CREATION Right    • OTHER      renal biopsy x 3   • OTHER      bone marrow biopsy       Family History  Family History   Problem Relation Age of Onset   • Diabetes Paternal " Grandmother        Social History  Social History     Socioeconomic History   • Marital status: Single     Spouse name: Not on file   • Number of children: Not on file   • Years of education: Not on file   • Highest education level: Not on file   Occupational History   • Not on file   Tobacco Use   • Smoking status: Never Smoker   • Smokeless tobacco: Never Used   Substance and Sexual Activity   • Alcohol use: No   • Drug use: No   • Sexual activity: Not on file   Other Topics Concern   • Not on file   Social History Narrative   • Not on file     Social Determinants of Health     Financial Resource Strain:    • Difficulty of Paying Living Expenses:    Food Insecurity: No Food Insecurity   • Worried About Running Out of Food in the Last Year: Never true   • Ran Out of Food in the Last Year: Never true   Transportation Needs: No Transportation Needs   • Lack of Transportation (Medical): No   • Lack of Transportation (Non-Medical): No   Physical Activity:    • Days of Exercise per Week:    • Minutes of Exercise per Session:    Stress:    • Feeling of Stress :    Social Connections:    • Frequency of Communication with Friends and Family:    • Frequency of Social Gatherings with Friends and Family:    • Attends Yazdanism Services:    • Active Member of Clubs or Organizations:    • Attends Club or Organization Meetings:    • Marital Status:    Intimate Partner Violence:    • Fear of Current or Ex-Partner:    • Emotionally Abused:    • Physically Abused:    • Sexually Abused:        Medications  Current Facility-Administered Medications   Medication Dose Route Frequency Provider Last Rate Last Admin   • morphine (pf) 4 mg/mL injection 4 mg  4 mg Intravenous Q HOUR PRN Dudley Kirk M.D.   4 mg at 03/04/21 0513   • pantoprazole (PROTONIX) 80 mg in  mL IVPB  80 mg Intravenous Once Dudley Kirk M.D.   Stopped at 03/04/21 0115   • NS infusion   Intravenous Continuous Dudley Kirk M.D. 30 mL/hr at 03/04/21  0738 New Bag at 03/04/21 0738   • senna-docusate (PERICOLACE or SENOKOT S) 8.6-50 MG per tablet 2 tablet  2 tablet Oral BID Jorge Styles D.O.        And   • polyethylene glycol/lytes (MIRALAX) PACKET 1 Packet  1 Packet Oral QDAY PRN Jorge Styles D.O.        And   • magnesium hydroxide (MILK OF MAGNESIA) suspension 30 mL  30 mL Oral QDAY PRN Jorge Styles D.O.        And   • bisacodyl (DULCOLAX) suppository 10 mg  10 mg Rectal QDAY PRN Jorge Styles D.O.       • enalaprilat (VASOTEC) injection 1.25 mg  1.25 mg Intravenous Q6HRS PRN Jorge Styles D.O.       • ondansetron (ZOFRAN) syringe/vial injection 4 mg  4 mg Intravenous Q4HRS PRN ZULEIKA HeartOJg   4 mg at 03/04/21 0513   • ondansetron (ZOFRAN ODT) dispertab 4 mg  4 mg Oral Q4HRS PRN Jorge Styles D.O.       • promethazine (PHENERGAN) tablet 12.5-25 mg  12.5-25 mg Oral Q4HRS PRN Jorge Styles D.O.       • promethazine (PHENERGAN) suppository 12.5-25 mg  12.5-25 mg Rectal Q4HRS PRN Jorge Styles D.O.       • prochlorperazine (COMPAZINE) injection 5-10 mg  5-10 mg Intravenous Q4HRS PRN Jorge Styles D.O.       • pantoprazole (PROTONIX) 80 mg in  mL Infusion  8 mg/hr Intravenous Continuous Jorge Styles D.O.       • hydroxychloroquine (Plaquenil) tablet 200 mg  200 mg Oral Q EVENING Jorge Styles D.O.       • PANTOPRAZOLE SODIUM 40 MG IV SOLR                Allergies  Allergies   Allergen Reactions   • Cephalexin Rash     .   • Clindamycin Rash     .   • Methylprednisolone Unspecified     Anxious   • Metoprolol Rash     .       Physical Exam  Temp:  [36.8 °C (98.3 °F)-36.9 °C (98.4 °F)] 36.8 °C (98.3 °F)  Pulse:  [] 125  Resp:  [14-17] 17  BP: (142-168)/() 156/107  SpO2:  [95 %-100 %] 100 %    Physical Exam   Constitutional: She is oriented to person, place, and time. She appears well-developed. No distress.   HENT:   Mouth/Throat: Oropharynx is clear and moist. No oropharyngeal exudate.   Eyes: EOM are  normal. No scleral icterus.   Neck: No tracheal deviation present.   Cardiovascular: Normal heart sounds.   No murmur heard.  Pulmonary/Chest: Effort normal. No stridor. She has no rales.   Abdominal: Soft. There is no abdominal tenderness.   Musculoskeletal:         General: No edema. Normal range of motion.   Neurological: She is alert and oriented to person, place, and time.   Skin: Skin is warm and dry.   Psychiatric: She has a normal mood and affect.   Access: Right brachiocephalic AV fistula, patent bruit and thrill    Fluids      Laboratory  Labs reviewed, pertinent labs below.  Recent Labs     03/04/21  0147   WBC 2.9*   RBC 2.34*   HEMOGLOBIN 5.9*   HEMATOCRIT 20.7*   MCV 88.5   MCH 25.2*   MCHC 28.5*   RDW 63.1*   PLATELETCT 89*   MPV 10.8     Recent Labs     03/04/21  0147   SODIUM 131*   POTASSIUM 3.7   CHLORIDE 90*   CO2 32   GLUCOSE 84   BUN 21   CREATININE 4.01*   CALCIUM 8.3*     Recent Labs     03/04/21 0147   APTT 33.4   INR 1.18*            URINALYSIS:  Lab Results   Component Value Date/Time    COLORURINE Yellow 02/15/2021 1108    CLARITY Clear 02/15/2021 1108    SPECGRAVITY 1.015 02/15/2021 1108    PHURINE 8.5 (A) 02/15/2021 1108    KETONES Negative 02/15/2021 1108    PROTEINURIN 30 (A) 02/15/2021 1108    BILIRUBINUR Negative 02/15/2021 1108    UROBILU 0.2 07/16/2020 0900    NITRITE Negative 02/15/2021 1108    LEUKESTERAS Negative 02/15/2021 1108    OCCULTBLOOD Trace (A) 02/15/2021 1108     UPC  Lab Results   Component Value Date/Time    TOTPROTUR 45.0 (H) 07/16/2020 0900      Lab Results   Component Value Date/Time    CREATININEU 18.93 07/16/2020 0900       Imaging reviewed  No orders to display         Assessment/Plan  25 y.o. female with lupus, ESRD on dialysis Monday Wednesday Friday who presented 3/4/2021 with hematemesis.    1.  ESRD on dialysis Monday Wednesday Friday.  Oliguric.  No acute need for dialysis today.  Plan on continuing Monday Wednesday Friday schedule.  Avoid nephrotoxins.   Check labs daily.    2.  Access: Right brachiocephalic AV fistula, patent.  Right arm precautions.    3.  Acute blood loss anemia, likely from GI bleed.  Patient has a history of this.  Agree with gastroenterology consult.  Check CBC at least daily.    4.  Anemia of chronic disease.  Continue Epogen 12,000 units thrice weekly with dialysis.  Check CBC at least daily.    5.  Systemic lupus erythematosus.  Patient follows with Dr. Wong for rheumatology.  She says there are plans to start Benlysta soon.  Recommend resuming home prednisone 5 mg p.o. daily.    6.  Hypertension, uncontrolled.  Plan on ultrafiltration with dialysis as tolerated.    7.  Hyponatremia, due to excess fluid intake.  Limit hypotonic fluids.  Restrict fluid to 1 L daily.  Check labs daily.    8.  Pancytopenia, possibly due to systemic lupus.  Check CBC daily.    Following      Navin Metz MD  Nephrology

## 2021-03-04 NOTE — ED TRIAGE NOTES
"Chief Complaint   Patient presents with   • Vomiting Blood     x2 episodes, dark brown in color   • Abdominal Pain   • Nausea       /106   Pulse (!) 123   Temp 36.9 °C (98.4 °F) (Oral)   Resp 14   Ht 1.702 m (5' 7\")   Wt 58 kg (127 lb 13.9 oz)   SpO2 95%   BMI 20.03 kg/m²     "

## 2021-03-04 NOTE — PROGRESS NOTES
Telemetry Shift Summary     Rhythm SR  HR Range 90 - 98  Measurements 0.16 / 0.08 / 0.34           Normal Values  Rhythm SR  HR Range    Measurements 0.12-0.20 / 0.06-0.10  / 0.30-0.52\

## 2021-03-04 NOTE — ED PROVIDER NOTES
ED Provider Note    CHIEF COMPLAINT  Chief Complaint   Patient presents with   • Vomiting Blood     x2 episodes, dark brown in color   • Abdominal Pain   • Nausea       HPI  Lily Nicole is a 25 y.o. female who presents for epigastric pain and nausea with coffee-ground emesis.  Patient notes she got nauseous about 5 PM after eating dinner and has twice vomited coffee-ground emesis.  She notes the pain started after vomiting.  Pain is in the epigastrium and nonradiating.  She notes that she used to take pantoprazole but ran out of the prescription.  She does have end-stage renal disease and did receive dialysis yesterday without incident.  Patient notes no recent dark tarry stools or blood in the stool and no lower abdominal pain.    REVIEW OF SYSTEMS  Constitutional: No fevers or chills  Skin: No rashes  HEENT: No sore throat, runny nose, sores, trouble swallowing, trouble speaking.  Neck: No neck pain  Chest: No pain   Pulm: No shortness of breath, cough, wheezing, stridor, or pain with inspiration/expiration  Gastrointestinal: No diarrhea, constipation, bloating, melena, or hematochezia  Genitourinary: End-stage renal disease on dialysis  Musculoskeletal: No recent trauma, pain, swelling, weakness  Neurologic: No sensory or motor changes. No confusion or disorientation.  Heme: No bleeding or bruising problems.   Immuno: History of lupus and on dialysis and therefore immunodeficient      PAST MEDICAL HISTORY   has a past medical history of Anemia (01/17/2018), AVF (arteriovenous fistula) ([facility]), Dialysis patient ([facility]), ESRD (end stage renal disease) on dialysis ([facility]) (01/17/2018), Heart burn, Hypertension (01/17/2018), Indigestion, Lupus ([facility]), Migraines (01/17/2018), and Seizure ([facility]) (2013).    SOCIAL HISTORY  Social History     Tobacco Use   • Smoking status: Never Smoker   • Smokeless tobacco: Never Used   Substance and Sexual Activity   • Alcohol use: No   • Drug use: No   • Sexual activity: Not on file  "      SURGICAL HISTORY   has a past surgical history that includes ronak by laparoscopy (4/5/2010); av fistula creation (Right); angioplasty (01/17/2018); other; other; gastroscopy-endo (12/9/2019); gastroscopy (N/A, 5/30/2020); gastroscopy-endo (9/18/2020); upper gi endoscopy,ctrl bleed (11/12/2020); upper gi endoscopy,biopsy (11/12/2020); gastroscopy-endo (11/12/2020); colonoscopy,diagnostic (1/8/2021); upper gi endoscopy,diagnosis (1/8/2021); and upper gi endoscopy,ctrl bleed (1/8/2021).    CURRENT MEDICATIONS  Home Medications     Reviewed by Rodri Head R.N. (Registered Nurse) on 03/04/21 at 0459  Med List Status: Complete   Medication Last Dose Status   acetaminophen (TYLENOL) 500 MG Tab 3/3/2021 Active   amLODIPine (NORVASC) 10 MG Tab 3/3/2021 Active   atenolol (TENORMIN) 25 MG Tab 3/3/2021 Active   furosemide (LASIX) 40 MG Tab 3/3/2021 Active   hydroxychloroquine (PLAQUENIL) 200 MG Tab 3/3/2021 Active   mycophenolate (MYFORTIC) 180 MG EC tablet 3/3/2021 Active   ondansetron (ZOFRAN ODT) 4 MG TABLET DISPERSIBLE 3/3/2021 Active   pantoprazole (PROTONIX) 40 MG Tablet Delayed Response 3/1/2021 Active   predniSONE (DELTASONE) 5 MG Tab 3/3/2021 Active   sucralfate (CARAFATE) 1 GM/10ML Suspension  Active                ALLERGIES  Allergies   Allergen Reactions   • Cephalexin Rash     .   • Clindamycin Rash     .   • Methylprednisolone Unspecified     Anxious   • Metoprolol Rash     .       PHYSICAL EXAM  VITAL SIGNS: /107   Pulse (!) 125   Temp 36.8 °C (98.3 °F) (Oral)   Resp 17   Ht 1.702 m (5' 7\")   Wt 60.4 kg (133 lb 2.5 oz)   SpO2 100%   BMI 20.86 kg/m²    Gen: Appears tired otherwise no apparent distress.  Sallow complex  HEENT: No signs of trauma, Bilateral external ears normal, Nose normal. Conjunctiva normal, Non-icteric.   Neck:  No tenderness, Supple, No masses  Lymphatic: No cervical lymphadenopathy noted.   Cardiovascular: Regular rate and rhythm, no murmurs.  Capillary refill less " than 3 seconds to all extremities, 2+ distal pulses.  Thorax & Lungs: Normal breath sounds, No respiratory distress, No wheezing bilateral chest rise  Abdomen: Bowel sounds normal, Soft, mild to moderate epigastric tenderness, No masses, No pulsatile masses. No Guarding or rebound  Skin: Warm, Dry, No erythema  Back: No bony tenderness, No CVA tenderness.   Extremities: Intact distal pulses, No edema.  Dialysis fistula noted to right upper extremity with good thrill.  Neurologic: Alert , no facial droop, grossly normal coordination and strength  Psychiatric: Affect pleasant      LABS  Results for orders placed or performed during the hospital encounter of 03/04/21   CBC WITH DIFFERENTIAL   Result Value Ref Range    WBC 2.9 (L) 4.8 - 10.8 K/uL    RBC 2.34 (L) 4.20 - 5.40 M/uL    Hemoglobin 5.9 (LL) 12.0 - 16.0 g/dL    Hematocrit 20.7 (L) 37.0 - 47.0 %    MCV 88.5 81.4 - 97.8 fL    MCH 25.2 (L) 27.0 - 33.0 pg    MCHC 28.5 (L) 33.6 - 35.0 g/dL    RDW 63.1 (H) 35.9 - 50.0 fL    Platelet Count 89 (L) 164 - 446 K/uL    MPV 10.8 9.0 - 12.9 fL    Neutrophils-Polys 77.40 (H) 44.00 - 72.00 %    Lymphocytes 13.40 (L) 22.00 - 41.00 %    Monocytes 7.90 0.00 - 13.40 %    Eosinophils 0.70 0.00 - 6.90 %    Basophils 0.30 0.00 - 1.80 %    Immature Granulocytes 0.30 0.00 - 0.90 %    Nucleated RBC 0.00 /100 WBC    Neutrophils (Absolute) 2.25 2.00 - 7.15 K/uL    Lymphs (Absolute) 0.39 (L) 1.00 - 4.80 K/uL    Monos (Absolute) 0.23 0.00 - 0.85 K/uL    Eos (Absolute) 0.02 0.00 - 0.51 K/uL    Baso (Absolute) 0.01 0.00 - 0.12 K/uL    Immature Granulocytes (abs) 0.01 0.00 - 0.11 K/uL    NRBC (Absolute) 0.00 K/uL    Hypochromia 1+     Anisocytosis 1+     Microcytosis 1+    COMP METABOLIC PANEL   Result Value Ref Range    Sodium 131 (L) 135 - 145 mmol/L    Potassium 3.7 3.6 - 5.5 mmol/L    Chloride 90 (L) 96 - 112 mmol/L    Co2 32 20 - 33 mmol/L    Anion Gap 9.0 7.0 - 16.0    Glucose 84 65 - 99 mg/dL    Bun 21 8 - 22 mg/dL    Creatinine 4.01  (H) 0.50 - 1.40 mg/dL    Calcium 8.3 (L) 8.4 - 10.2 mg/dL    AST(SGOT) 25 12 - 45 U/L    ALT(SGPT) 8 2 - 50 U/L    Alkaline Phosphatase 58 30 - 99 U/L    Total Bilirubin 0.4 0.1 - 1.5 mg/dL    Albumin 2.8 (L) 3.2 - 4.9 g/dL    Total Protein 7.3 6.0 - 8.2 g/dL    Globulin 4.5 (H) 1.9 - 3.5 g/dL    A-G Ratio 0.6 g/dL   LIPASE   Result Value Ref Range    Lipase 26 7 - 58 U/L   PROTHROMBIN TIME (INR)   Result Value Ref Range    PT 14.7 (H) 12.0 - 14.6 sec    INR 1.18 (H) 0.87 - 1.13   APTT   Result Value Ref Range    APTT 33.4 24.7 - 36.0 sec   COD (ADULT)   Result Value Ref Range    ABO Grouping Only O     Rh Grouping Only POS     Antibody Screen-Cod NEG     Component R       R99                 Red Cells, LR       [id number]   selected     03/04/21   04:28      Product Type R99     Dispense Status selected     Unit Number (Barcoded) [id number]     Product Code (Barcoded) T1307E17     Blood Type (Barcoded) 5100     Component R       R99                 Red Cells, LR       [id number]   selected     03/04/21   04:28      Product Type R99     Dispense Status selected     Unit Number (Barcoded) [id number]     Product Code (Barcoded) L7290J14     Blood Type (Barcoded) 5100    MAGNESIUM   Result Value Ref Range    Magnesium 1.6 1.5 - 2.5 mg/dL   PHOSPHORUS   Result Value Ref Range    Phosphorus 2.5 2.5 - 4.5 mg/dL   HCG QUAL SERUM   Result Value Ref Range    Beta-Hcg Qualitative Serum Negative Negative   ESTIMATED GFR   Result Value Ref Range    GFR If  17 (A) >60 mL/min/1.73 m 2    GFR If Non  14 (A) >60 mL/min/1.73 m 2   PLATELET ESTIMATE   Result Value Ref Range    Plt Estimation Decreased    MORPHOLOGY   Result Value Ref Range    RBC Morphology Present     Polychromia 1+     Poikilocytosis 1+     Ovalocytes 1+    DIFFERENTIAL COMMENT   Result Value Ref Range    Comments-Diff see below    SARS-COV Antigen CLARY: Collect dry nasal swab AND NP swab in VTM   Result Value Ref Range     SARS-CoV-2 Source Nasal Swab     SARS-COV ANTIGEN CLARY NotDetected Not-Detected   SARS-CoV-2 PCR (24 hour In-House): Collect NP swab in VTM    Specimen: Respirate   Result Value Ref Range    SARS-CoV-2 Source NP Swab        RADIOLOGY  No orders to display       COURSE & MEDICAL DECISION MAKING  Patient arrives for evaluation of symptoms that are suggestive of an upper GI bleed.  Patient notes that she has had a bleeding ulcer and has had to have an endoscopy with cauterization in the past.  Likely this will need to happen again given her medical frailty.    1:50 AM  IV established in EJ after numerous attempts to extremities    Patient's work-up is consistent with severe anemia and she will need transfusion.  I discussed this with the patient and her mother at bedside and they note that she has had multiple transfusions in the past and was set up for transfusion upcoming due to this anemia.  This appears to be at least a point worse than her norm, however.  I did discussed risk versus benefits and possible side effects/complications of transfusion including allergic reaction, fluid overload, and transmission of communicable diseases.  Patient states understanding of this and wishes to proceed with the transfusion.  Patient was discussed with the hospitalist will admit primarily.  She was given a dose of Protonix in the emergency department and did not experience any deterioration in her hemodynamics.    FINAL IMPRESSION  1.  Hematemesis  2.  Severe anemia  3.  Pancytopenia  Electronically signed by: Dudley Kirk M.D., 3/4/2021 12:52 AM

## 2021-03-04 NOTE — PROGRESS NOTES
Admitted this morning for hematemesis.  Hx lupus nephritis, ESRD on HD.  Hx hemorrhagic gastritis, peptic ulcer disease.  Hgb 5.9, given 1 unit RBC transfusion. On PPI ggt. Monitor H/H.  Gastroenterology consulted, to eval patient for likely EGD.  Nephrology consulted, to continue HD while inpatient, on epogen.  Holding home prednisone 5 mg for now, likely will restart in near future.

## 2021-03-04 NOTE — ED NOTES
Lab called with critical result of hemoglobin at 5.9. Critical lab result read back.   Dr. Kirk notified of critical lab result.

## 2021-03-04 NOTE — PROGRESS NOTES
2 RN SKIN CHECK    2 RN skin check completed with second RN.   Devices in place - IV catheter.  Skin assessed under devices - Intact.  Confirmed pressure ulcers found on - None.  New potential pressure ulcers noted on - None.  Wound consult placed - N/A.    The following interventions in place - reminded patient to turn at least every 2 hours.

## 2021-03-04 NOTE — ASSESSMENT & PLAN NOTE
-Acute worsening due to GI bleeding, 1 unit of PRBCs is pending, hemoglobin will repeated posttransfusion, continue to transfuse for hemoglobin less than 7  She does have pancytopenia, white blood cells and platelets are close to her baseline  -Repeat CBC in the morning

## 2021-03-04 NOTE — CARE PLAN
Problem: Communication  Goal: The ability to communicate needs accurately and effectively will improve  Outcome: PROGRESSING AS EXPECTED  Note: Patient updated on the plan of care. Encouraged to voice feelings and to ask questions. Answered all questions and concerns. Agrees with the plan of care.      Problem: Safety  Goal: Will remain free from injury  Outcome: PROGRESSING AS EXPECTED  Note: Safety precautions in place. Bed alarm ON. Will continue to monitor patient.   Goal: Will remain free from falls  Outcome: PROGRESSING AS EXPECTED  Note: Safety precautions in place. Bed alarm ON. Will continue to monitor patient.

## 2021-03-04 NOTE — ASSESSMENT & PLAN NOTE
-Continue home Plaquenil mycophenolate  -Hold home prednisone until gastritis is improved  -No acute flare

## 2021-03-04 NOTE — CONSULTS
Gastroenterology Initial (GIC) Consult Note               Author:  ERICA Mcgrath Date & Time Created: 3/4/2021 12:48 PM       Patient ID:  Name:             Lily Abdi  YOB: 1995  Age:                 25 y.o.  female  MRN:               [id number]      Referring Provider:  Jorge Carroll MD      Presenting Chief Complaint:  Hematemesis      History of Present Illness::    She is a 25-year-old female patient seen in consultation for hematemesis.  The patient presented to the emergency room yesterday with one episode of brown appearing coffee-ground emesis x1.  No other episodes.  She has a known past medical history of lupus with resultant nephritis and end-stage renal disease on hemodialysis.  She has been on long-term low-dose prednisone, and is currently on the transplant list for new kidneys with [facility].  She is well-known to our practice with multiple admissions for similar symptoms of hematemesis and endoscopic evidence of hemorrhagic gastritis typically treated with ablation.  It seems that she does well for 1 to 3 months, but then has another episode with further bleeding.  She states that she was taking her pantoprazole daily, but did run out of her medication yesterday.  She is avoiding NSAIDs.  Her hemoglobin typically runs low due to chronic kidney disease, but her hemoglobin upon admission was 5.9, lower than her normal around 7.5.  She denies melena, current abdominal pain.  Denied further modifying factors, associated symptoms or timing issues.     Review of Systems:  Review of Systems   Constitutional: Positive for malaise/fatigue. Negative for chills and fever.   HENT: Negative.  Negative for sinus pain and sore throat.    Eyes: Negative.  Negative for pain, discharge and redness.   Respiratory: Negative.  Negative for cough, sputum production, shortness of breath, wheezing and stridor.    Cardiovascular: Negative.  Negative for chest pain and palpitations.  "  Gastrointestinal: Positive for nausea and vomiting. Negative for abdominal pain, blood in stool, constipation, diarrhea and melena.   Genitourinary: Negative.  Negative for flank pain, frequency and hematuria.   Musculoskeletal: Positive for joint pain (L hip). Negative for back pain and myalgias.   Skin: Negative.  Negative for itching and rash.   Neurological: Negative.  Negative for tremors, seizures, loss of consciousness and headaches.   Endo/Heme/Allergies: Negative for polydipsia. Bruises/bleeds easily.   Psychiatric/Behavioral: Positive for depression. The patient is not nervous/anxious and does not have insomnia.    All other systems reviewed and are negative.            Past Medical History:  Past Medical History:   Diagnosis Date   • Anemia 01/17/2018   • AVF (arteriovenous fistula) ([facility])     Right Arm   • Dialysis patient ([facility])      dialysis, M,W,F Elizabeth/Joni   • ESRD (end stage renal disease) on dialysis ([facility]) 01/17/2018    Twan Fu   • Heart burn    • Hypertension 01/17/2018    \"Controlled with medication\"   • Indigestion    • Lupus ([facility])    • Migraines 01/17/2018   • Seizure ([facility]) 2013    from high blood pressure, reports 1 time event     Active Hospital Problems    Diagnosis    • GIB (gastrointestinal bleeding) [K92.2]      Priority: High   • Acute on chronic anemia [D64.9]      Priority: Medium   • Lupus ([facility]) [M32.9]      Priority: Medium   • ESRD (end stage renal disease) on dialysis ([facility]) [N18.6, Z99.2]      Priority: Medium         Past Surgical History:  Past Surgical History:   Procedure Laterality Date   • PB COLONOSCOPY,DIAGNOSTIC  1/8/2021    Procedure: COLONOSCOPY;  Surgeon: Hrebert Contreras M.D.;  Location: [facility];  Service: Gastroenterology   • PB UPPER GI ENDOSCOPY,DIAGNOSIS  1/8/2021    Procedure: GASTROSCOPY;  Surgeon: Herbert Contreras M.D.;  Location: SURGERY [facility];  Service: Gastroenterology   • PB UPPER GI ENDOSCOPY,CTRL BLEED  1/8/2021    Procedure: GASTROSCOPY, " "WITH ARGON PLASMA COAGULATION;  Surgeon: Herbert Contreras M.D.;  Location: SURGERY [facility];  Service: Gastroenterology   • PB UPPER GI ENDOSCOPY,CTRL BLEED  11/12/2020    Procedure: GASTROSCOPY, WITH ARGON PLASMA COAGULATION;  Surgeon: Gadiel Whitney M.D.;  Location: SURGERY [facility];  Service: Gastroenterology   • PB UPPER GI ENDOSCOPY,BIOPSY  11/12/2020    Procedure: GASTROSCOPY, WITH BIOPSY;  Surgeon: Gadiel Whitney M.D.;  Location: SURGERY [facility];  Service: Gastroenterology   • GASTROSCOPY-ENDO  11/12/2020    Procedure: GASTROSCOPY;  Surgeon: Gadiel Whitney M.D.;  Location: [facility];  Service: Gastroenterology   • GASTROSCOPY-ENDO  9/18/2020    Procedure: GASTROSCOPY;  Surgeon: Gadiel Whitney M.D.;  Location: [facility];  Service: Gastroenterology   • GASTROSCOPY N/A 5/30/2020    Procedure: GASTROSCOPY;  Surgeon: Waylon Mcqueen M.D.;  Location: [facility];  Service: Gastroenterology   • GASTROSCOPY-ENDO  12/9/2019    Procedure: GASTROSCOPY;  Surgeon: Aaron Kam M.D.;  Location: [facility];  Service: Gastroenterology   • ANGIOPLASTY  01/17/2018    \"Right Arm AV-Fistulagram & Angioplastyx3\"   • ULI BY LAPAROSCOPY  4/5/2010    Performed by SYED MARTELL at SURGERY [facility]   • AV FISTULA CREATION Right    • OTHER      renal biopsy x 3   • OTHER      bone marrow biopsy         Hospital Medications:  Current Facility-Administered Medications   Medication Dose Frequency Provider Last Rate Last Admin   • morphine (pf) 4 mg/mL injection 4 mg  4 mg Q HOUR PRN Dudley Kirk M.D.   4 mg at 03/04/21 0513   • pantoprazole (PROTONIX) 80 mg in  mL IVPB  80 mg Once Dudley Kirk M.D.   Stopped at 03/04/21 0115   • NS infusion   Continuous Dudley Kirk M.D. 30 mL/hr at 03/04/21 0738 New Bag at 03/04/21 0738   • senna-docusate (PERICOLACE or SENOKOT S) 8.6-50 MG per tablet 2 tablet  2 tablet BID Jorge Styles D.O.        " And   • polyethylene glycol/lytes (MIRALAX) PACKET 1 Packet  1 Packet QDAY PRN ZULEIKA HeartO.        And   • magnesium hydroxide (MILK OF MAGNESIA) suspension 30 mL  30 mL QDAY PRN Jogre Styles D.O.        And   • bisacodyl (DULCOLAX) suppository 10 mg  10 mg QDAY PRN ROGE Heart.O.       • enalaprilat (VASOTEC) injection 1.25 mg  1.25 mg Q6HRS PRN ROGE Heart.O.       • ondansetron (ZOFRAN) syringe/vial injection 4 mg  4 mg Q4HRS PRN ROGE Heart.O.   4 mg at 03/04/21 0513   • ondansetron (ZOFRAN ODT) dispertab 4 mg  4 mg Q4HRS PRN ROGE Heart.O.       • promethazine (PHENERGAN) tablet 12.5-25 mg  12.5-25 mg Q4HRS PRN ROGE [facility].O.       • promethazine (PHENERGAN) suppository 12.5-25 mg  12.5-25 mg Q4HRS PRN ROGE [facility].O.       • prochlorperazine (COMPAZINE) injection 5-10 mg  5-10 mg Q4HRS PRN ROGE Heart.O.       • pantoprazole (PROTONIX) 80 mg in  mL Infusion  8 mg/hr Continuous ROGE Heart.O. 25 mL/hr at 03/04/21 1135 8 mg/hr at 03/04/21 1135   • hydroxychloroquine (Plaquenil) tablet 200 mg  200 mg Q EVENING ROGE Heart.O.       • PANTOPRAZOLE SODIUM 40 MG IV SOLR           • NS (BOLUS) infusion 250 mL  250 mL DIALYSIS PRN Navin Metz M.D.       • lidocaine (XYLOCAINE) 1 % injection 1 mL  1 mL PRN Navin Metz M.D.       • [START ON 3/5/2021] epoetin (Retacrit) injection (Dialysis use only) 10,000 Units  10,000 Units MO, WE + FR Navin Metz M.D.       • [START ON 3/5/2021] epoetin (Retacrit) injection (Dialysis Use Only) 2,000 Units  2,000 Units MO, WE + FR Navin Metz M.D.       • losartan (COZAAR) tablet 100 mg  100 mg Q DAY Navin Metz M.D.       • atenolol (TENORMIN) tablet 25 mg  25 mg Q EVENING Jorge Carroll M.D.       • amLODIPine (NORVASC) tablet 10 mg  10 mg Q EVENING Jorge Carroll M.D.       Last reviewed on 3/4/2021  4:59 AM by Rodri Head R.N.        Current Outpatient Medications:  Medications Prior to Admission    Medication Sig Dispense Refill Last Dose   • ondansetron (ZOFRAN ODT) 4 MG TABLET DISPERSIBLE Take 1 tablet by mouth every four hours as needed for Nausea (give PO if no IV route available). 20 tablet 0 3/3/2021 at Unknown time   • sucralfate (CARAFATE) 1 GM/10ML Suspension Take 10 mL by mouth 4 Times a Day,Before Meals and at Bedtime. (Patient taking differently: Take 1 g by mouth 2 Times a Day. Before lunch and before dinner) 1200 mL 1    • pantoprazole (PROTONIX) 40 MG Tablet Delayed Response Take 1 Tab by mouth 2 times a day. 60 Tab 0 3/1/2021 at Unknown   • furosemide (LASIX) 40 MG Tab Take 40 mg by mouth 2 (two) times a day.   3/3/2021 at Unknown time   • predniSONE (DELTASONE) 5 MG Tab Take 5 mg by mouth every evening.   3/3/2021 at Unknown time   • atenolol (TENORMIN) 25 MG Tab Take 25 mg by mouth every evening.   3/3/2021 at Unknown time   • mycophenolate (MYFORTIC) 180 MG EC tablet Take 1 Tab by mouth every evening. 30 Tab 1 3/3/2021 at Unknown time   • acetaminophen (TYLENOL) 500 MG Tab Take 500 mg by mouth every 6 hours as needed for Moderate Pain.   3/3/2021 at Unknown time   • [DISCONTINUED] amLODIPine (NORVASC) 10 MG Tab Take 10 mg by mouth every evening.   3/3/2021 at Unknown time   • hydroxychloroquine (PLAQUENIL) 200 MG Tab Take 200 mg by mouth every evening.   3/3/2021 at Unknown time         Medication Allergies:  Allergies   Allergen Reactions   • Cephalexin Rash     .   • Clindamycin Rash     .   • Methylprednisolone Unspecified     Anxious   • Metoprolol Rash     .         Family Medical History:  Family History   Problem Relation Age of Onset   • Diabetes Paternal Grandmother          Social History:  Social History     Socioeconomic History   • Marital status: Single     Spouse name: Not on file   • Number of children: Not on file   • Years of education: Not on file   • Highest education level: Not on file   Occupational History   • Not on file   Tobacco Use   • Smoking status: Never  "Smoker   • Smokeless tobacco: Never Used   Substance and Sexual Activity   • Alcohol use: No   • Drug use: No   • Sexual activity: Not on file   Other Topics Concern   • Not on file   Social History Narrative   • Not on file     Social Determinants of Health     Financial Resource Strain:    • Difficulty of Paying Living Expenses:    Food Insecurity: No Food Insecurity   • Worried About Running Out of Food in the Last Year: Never true   • Ran Out of Food in the Last Year: Never true   Transportation Needs: No Transportation Needs   • Lack of Transportation (Medical): No   • Lack of Transportation (Non-Medical): No   Physical Activity:    • Days of Exercise per Week:    • Minutes of Exercise per Session:    Stress:    • Feeling of Stress :    Social Connections:    • Frequency of Communication with Friends and Family:    • Frequency of Social Gatherings with Friends and Family:    • Attends Mu-ism Services:    • Active Member of Clubs or Organizations:    • Attends Club or Organization Meetings:    • Marital Status:    Intimate Partner Violence:    • Fear of Current or Ex-Partner:    • Emotionally Abused:    • Physically Abused:    • Sexually Abused:          Vital signs:  Weight/BMI: Body mass index is 20.86 kg/m².  /94   Pulse 90   Temp 36.5 °C (97.7 °F) (Oral)   Resp 16   Ht 1.702 m (5' 7\")   Wt 60.4 kg (133 lb 2.5 oz)   SpO2 100%   Vitals:    03/04/21 0806 03/04/21 0820 03/04/21 0835 03/04/21 1028   BP: 137/96 138/91 139/92 133/94   Pulse: 94 89 91 90   Resp: 16 16 16 16   Temp: 36.5 °C (97.7 °F) 36.6 °C (97.9 °F) 36.6 °C (97.9 °F) 36.5 °C (97.7 °F)   TempSrc: Axillary Axillary Axillary Oral   SpO2: 100% 100% 100% 100%   Weight:       Height:         Oxygen Therapy:  Pulse Oximetry: 100 %, O2 (LPM): 2, O2 Delivery Device: Nasal Cannula    Intake/Output Summary (Last 24 hours) at 3/4/2021 1248  Last data filed at 3/4/2021 1028  Gross per 24 hour   Intake 432.5 ml   Output --   Net 432.5 ml "         Physical Exam:  Physical Exam  Vitals and nursing note reviewed. Exam conducted with a chaperone present.   Constitutional:       General: She is not in acute distress.     Appearance: Normal appearance. She is normal weight. She is ill-appearing. She is not toxic-appearing or diaphoretic.   HENT:      Head: Normocephalic and atraumatic.      Right Ear: External ear normal.      Left Ear: External ear normal.      Nose: Nose normal. No congestion or rhinorrhea.      Mouth/Throat:      Mouth: Mucous membranes are moist.      Pharynx: Oropharynx is clear. No oropharyngeal exudate or posterior oropharyngeal erythema.   Eyes:      General: No scleral icterus.        Right eye: No discharge.         Left eye: No discharge.      Extraocular Movements: Extraocular movements intact.      Conjunctiva/sclera: Conjunctivae normal.      Pupils: Pupils are equal, round, and reactive to light.   Cardiovascular:      Rate and Rhythm: Normal rate and regular rhythm.      Pulses: Normal pulses.      Heart sounds: Normal heart sounds. No murmur.   Pulmonary:      Effort: Pulmonary effort is normal. No respiratory distress.      Breath sounds: Normal breath sounds. No wheezing.   Abdominal:      General: Abdomen is flat. Bowel sounds are normal. There is no distension.      Palpations: Abdomen is soft.      Tenderness: There is no abdominal tenderness. There is no guarding or rebound.   Musculoskeletal:         General: No swelling or tenderness.      Cervical back: Normal range of motion and neck supple.      Comments: AV fistula with positive bruit and thrill   Lymphadenopathy:      Cervical: No cervical adenopathy.   Skin:     General: Skin is warm and dry.      Capillary Refill: Capillary refill takes less than 2 seconds.      Comments: Hypopigmented scarring spot and discoid lesions   Neurological:      General: No focal deficit present.      Mental Status: She is alert and oriented to person, place, and time. Mental  status is at baseline.      Cranial Nerves: No cranial nerve deficit.      Coordination: Coordination normal.   Psychiatric:         Mood and Affect: Mood normal.         Behavior: Behavior normal.         Thought Content: Thought content normal.         Judgment: Judgment normal.           Labs:  Recent Labs     03/04/21  0147   SODIUM 131*   POTASSIUM 3.7   CHLORIDE 90*   CO2 32   BUN 21   CREATININE 4.01*   MAGNESIUM 1.6   PHOSPHORUS 2.5   CALCIUM 8.3*     Recent Labs     03/04/21  0147   ALTSGPT 8   ASTSGOT 25   ALKPHOSPHAT 58   TBILIRUBIN 0.4   LIPASE 26   GLUCOSE 84     Recent Labs     03/04/21  0147   WBC 2.9*   NEUTSPOLYS 77.40*   LYMPHOCYTES 13.40*   MONOCYTES 7.90   EOSINOPHILS 0.70   BASOPHILS 0.30   ASTSGOT 25   ALTSGPT 8   ALKPHOSPHAT 58   TBILIRUBIN 0.4     Recent Labs     03/04/21  0147 03/04/21  1140   RBC 2.34*  --    HEMOGLOBIN 5.9* 6.6*   HEMATOCRIT 20.7*  --    PLATELETCT 89*  --    PROTHROMBTM 14.7*  --    APTT 33.4  --    INR 1.18*  --      Recent Results (from the past 24 hour(s))   CBC WITH DIFFERENTIAL    Collection Time: 03/04/21  1:47 AM   Result Value Ref Range    WBC 2.9 (L) 4.8 - 10.8 K/uL    RBC 2.34 (L) 4.20 - 5.40 M/uL    Hemoglobin 5.9 (LL) 12.0 - 16.0 g/dL    Hematocrit 20.7 (L) 37.0 - 47.0 %    MCV 88.5 81.4 - 97.8 fL    MCH 25.2 (L) 27.0 - 33.0 pg    MCHC 28.5 (L) 33.6 - 35.0 g/dL    RDW 63.1 (H) 35.9 - 50.0 fL    Platelet Count 89 (L) 164 - 446 K/uL    MPV 10.8 9.0 - 12.9 fL    Neutrophils-Polys 77.40 (H) 44.00 - 72.00 %    Lymphocytes 13.40 (L) 22.00 - 41.00 %    Monocytes 7.90 0.00 - 13.40 %    Eosinophils 0.70 0.00 - 6.90 %    Basophils 0.30 0.00 - 1.80 %    Immature Granulocytes 0.30 0.00 - 0.90 %    Nucleated RBC 0.00 /100 WBC    Neutrophils (Absolute) 2.25 2.00 - 7.15 K/uL    Lymphs (Absolute) 0.39 (L) 1.00 - 4.80 K/uL    Monos (Absolute) 0.23 0.00 - 0.85 K/uL    Eos (Absolute) 0.02 0.00 - 0.51 K/uL    Baso (Absolute) 0.01 0.00 - 0.12 K/uL    Immature Granulocytes (abs)  0.01 0.00 - 0.11 K/uL    NRBC (Absolute) 0.00 K/uL    Hypochromia 1+     Anisocytosis 1+     Microcytosis 1+    COMP METABOLIC PANEL    Collection Time: 03/04/21  1:47 AM   Result Value Ref Range    Sodium 131 (L) 135 - 145 mmol/L    Potassium 3.7 3.6 - 5.5 mmol/L    Chloride 90 (L) 96 - 112 mmol/L    Co2 32 20 - 33 mmol/L    Anion Gap 9.0 7.0 - 16.0    Glucose 84 65 - 99 mg/dL    Bun 21 8 - 22 mg/dL    Creatinine 4.01 (H) 0.50 - 1.40 mg/dL    Calcium 8.3 (L) 8.4 - 10.2 mg/dL    AST(SGOT) 25 12 - 45 U/L    ALT(SGPT) 8 2 - 50 U/L    Alkaline Phosphatase 58 30 - 99 U/L    Total Bilirubin 0.4 0.1 - 1.5 mg/dL    Albumin 2.8 (L) 3.2 - 4.9 g/dL    Total Protein 7.3 6.0 - 8.2 g/dL    Globulin 4.5 (H) 1.9 - 3.5 g/dL    A-G Ratio 0.6 g/dL   LIPASE    Collection Time: 03/04/21  1:47 AM   Result Value Ref Range    Lipase 26 7 - 58 U/L   PROTHROMBIN TIME (INR)    Collection Time: 03/04/21  1:47 AM   Result Value Ref Range    PT 14.7 (H) 12.0 - 14.6 sec    INR 1.18 (H) 0.87 - 1.13   APTT    Collection Time: 03/04/21  1:47 AM   Result Value Ref Range    APTT 33.4 24.7 - 36.0 sec   COD (ADULT)    Collection Time: 03/04/21  1:47 AM   Result Value Ref Range    ABO Grouping Only O     Rh Grouping Only POS     Antibody Screen-Cod NEG     Component R       R99                 Red Cells, LR       [id number]   selected     03/04/21   04:28      Product Type R99     Dispense Status selected     Unit Number (Barcoded) [id number]     Product Code (Barcoded) G9631Z89     Blood Type (Barcoded) 5100     Component R       R99                 Red Cells, LR       [id number]   issued       03/04/21   07:55      Product Type R99     Dispense Status issued     Unit Number (Barcoded) [id number]     Product Code (Barcoded) W6084O02     Blood Type (Barcoded) 5100    MAGNESIUM    Collection Time: 03/04/21  1:47 AM   Result Value Ref Range    Magnesium 1.6 1.5 - 2.5 mg/dL   PHOSPHORUS    Collection Time: 03/04/21  1:47 AM   Result Value Ref  Range    Phosphorus 2.5 2.5 - 4.5 mg/dL   HCG QUAL SERUM    Collection Time: 03/04/21  1:47 AM   Result Value Ref Range    Beta-Hcg Qualitative Serum Negative Negative   ESTIMATED GFR    Collection Time: 03/04/21  1:47 AM   Result Value Ref Range    GFR If  17 (A) >60 mL/min/1.73 m 2    GFR If Non  14 (A) >60 mL/min/1.73 m 2   PLATELET ESTIMATE    Collection Time: 03/04/21  1:47 AM   Result Value Ref Range    Plt Estimation Decreased    MORPHOLOGY    Collection Time: 03/04/21  1:47 AM   Result Value Ref Range    RBC Morphology Present     Polychromia 1+     Poikilocytosis 1+     Ovalocytes 1+    DIFFERENTIAL COMMENT    Collection Time: 03/04/21  1:47 AM   Result Value Ref Range    Comments-Diff see below    SARS-COV Antigen CLARY: Collect dry nasal swab AND NP swab in VTM    Collection Time: 03/04/21  3:30 AM   Result Value Ref Range    SARS-CoV-2 Source Nasal Swab     SARS-COV ANTIGEN CLARY NotDetected Not-Detected   SARS-CoV-2 PCR (24 hour In-House): Collect NP swab in VTM    Collection Time: 03/04/21  3:30 AM    Specimen: Respirate   Result Value Ref Range    SARS-CoV-2 Source NP Swab    HGB (Hemoglobin) for 48 hours    Collection Time: 03/04/21 11:40 AM   Result Value Ref Range    Hemoglobin 6.6 (L) 12.0 - 16.0 g/dL         Radiology Review:  No orders to display         MDM (Data Review):   -Records reviewed and summarized in current documentation  -I personally reviewed and interpreted the laboratory results  -I personally reviewed the radiology images      Medical Decision Making, by Problem:  Active Hospital Problems    Diagnosis    • GIB (gastrointestinal bleeding) [K92.2]      Priority: High   • Acute on chronic anemia [D64.9]      Priority: Medium   • Lupus (HCC) [M32.9]      Priority: Medium   • ESRD (end stage renal disease) on dialysis (HCC) [N18.6, Z99.2]      Priority: Medium           Problems:  Assessment:  1.  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   2.  Acute on chronic anemia from acute blood loss in addition to chronic kidney disease  3.  End-stage renal disease on hemodialysis  4.  Lupus with nephritis complication on chronic steroids  5.  COVID negative 3/4/2021  6.  Increased complexity of medical decision making due to aforementioned.    Recommendations::  1.  Esophagogastroduodenoscopy with attempted hemostasis, biopsies under anesthesia and/or other endotherapy scheduled tomorrow   @1:30-2pm.    Risks, benefits, and alternatives of aforementioned procedures were discussed with patient in detail.  Patient was given opportunities to ask questions and discuss other options.  Risks including but not limited to perforation, infection, bleeding, missed lesion(s), possible need for surgery(ies) and/or interventional radiology, possible need for repeat procedure(s) and/or additional testing, hospitalization possibly prolonged, cardiac and/or pulmonary event, aspiration, hypoxia, stroke, medication (s) and/or anesthesia reaction(s), indefinite diagnosis, discomfort/pain, unsuccessful and/or incomplete procedure, ineffective therapy, persistent symptoms, damage to adjacent organs/structure and/or vascular, and other adverse events possibly life-threatening.  Interactive discussion was undertaken with Layman's terms.  I answered questions in full and to satisfaction.  Consenting person(s) stated understanding and acceptance of these risks, and wished to proceed. Informed consent was given in clear state of mind.    2.  Protonix 40 mg IV twice daily    3.  Avoid anticoagulants/VTE chemical prophylaxis    4.  Full liquid diet today, n.p.o. after midnight    5.  serial hemoglobin and hematocrit, transfuse for hemoglobin less than 7    6. Avoid NSAIDs.    7. Will need close follow-up with established GI DrJg  Negin as outpatient.    Thank you very much for allowing me to participate in the care of your patient.  Please feel free to contact me anytime at [phone].     JOSE EDUARDO Mcgrath.  Ej Silva MD, [facility], [facility] [place]    Core Quality Measures   Reviewed items::  Labs, Medications and Radiology reports reviewed    ---  Dear Dr. Jorge Carroll,  Thank you for asking us to see your patient in consultation.  Please refer to my consult note as per above for details and recommendations.

## 2021-03-05 ENCOUNTER — ANESTHESIA (OUTPATIENT)
Dept: SURGERY | Facility: MEDICAL CENTER | Age: 26
DRG: 368 | End: 2021-03-05
Payer: COMMERCIAL

## 2021-03-05 LAB
ANION GAP SERPL CALC-SCNC: 12 MMOL/L (ref 7–16)
ANION GAP SERPL CALC-SCNC: 9 MMOL/L (ref 7–16)
BUN SERPL-MCNC: 27 MG/DL (ref 8–22)
BUN SERPL-MCNC: 9 MG/DL (ref 8–22)
CALCIUM SERPL-MCNC: 8.5 MG/DL (ref 8.4–10.2)
CALCIUM SERPL-MCNC: 8.5 MG/DL (ref 8.4–10.2)
CHLORIDE SERPL-SCNC: 91 MMOL/L (ref 96–112)
CHLORIDE SERPL-SCNC: 93 MMOL/L (ref 96–112)
CO2 SERPL-SCNC: 29 MMOL/L (ref 20–33)
CO2 SERPL-SCNC: 29 MMOL/L (ref 20–33)
CREAT SERPL-MCNC: 2.68 MG/DL (ref 0.5–1.4)
CREAT SERPL-MCNC: 5.58 MG/DL (ref 0.5–1.4)
ERYTHROCYTE [DISTWIDTH] IN BLOOD BY AUTOMATED COUNT: 62.3 FL (ref 35.9–50)
GLUCOSE SERPL-MCNC: 74 MG/DL (ref 65–99)
GLUCOSE SERPL-MCNC: 78 MG/DL (ref 65–99)
HCT VFR BLD AUTO: 25.5 % (ref 37–47)
HGB BLD-MCNC: 7.4 G/DL (ref 12–16)
HGB BLD-MCNC: 8.1 G/DL (ref 12–16)
HGB BLD-MCNC: 8.9 G/DL (ref 12–16)
MCH RBC QN AUTO: 25.5 PG (ref 27–33)
MCHC RBC AUTO-ENTMCNC: 29 G/DL (ref 33.6–35)
MCV RBC AUTO: 87.9 FL (ref 81.4–97.8)
PATHOLOGY CONSULT NOTE: NORMAL
PLATELET # BLD AUTO: 85 K/UL (ref 164–446)
PMV BLD AUTO: 12.2 FL (ref 9–12.9)
POTASSIUM SERPL-SCNC: 3.4 MMOL/L (ref 3.6–5.5)
POTASSIUM SERPL-SCNC: 4.4 MMOL/L (ref 3.6–5.5)
RBC # BLD AUTO: 2.9 M/UL (ref 4.2–5.4)
SODIUM SERPL-SCNC: 129 MMOL/L (ref 135–145)
SODIUM SERPL-SCNC: 134 MMOL/L (ref 135–145)
WBC # BLD AUTO: 2 K/UL (ref 4.8–10.8)

## 2021-03-05 PROCEDURE — 85018 HEMOGLOBIN: CPT

## 2021-03-05 PROCEDURE — 0DB98ZX EXCISION OF DUODENUM, VIA NATURAL OR ARTIFICIAL OPENING ENDOSCOPIC, DIAGNOSTIC: ICD-10-PCS | Performed by: INTERNAL MEDICINE

## 2021-03-05 PROCEDURE — 160009 HCHG ANES TIME/MIN: Performed by: INTERNAL MEDICINE

## 2021-03-05 PROCEDURE — 700105 HCHG RX REV CODE 258: Performed by: ANESTHESIOLOGY

## 2021-03-05 PROCEDURE — 160048 HCHG OR STATISTICAL LEVEL 1-5: Performed by: INTERNAL MEDICINE

## 2021-03-05 PROCEDURE — 160002 HCHG RECOVERY MINUTES (STAT): Performed by: INTERNAL MEDICINE

## 2021-03-05 PROCEDURE — 700102 HCHG RX REV CODE 250 W/ 637 OVERRIDE(OP): Performed by: INTERNAL MEDICINE

## 2021-03-05 PROCEDURE — A9270 NON-COVERED ITEM OR SERVICE: HCPCS | Performed by: INTERNAL MEDICINE

## 2021-03-05 PROCEDURE — 700105 HCHG RX REV CODE 258: Performed by: INTERNAL MEDICINE

## 2021-03-05 PROCEDURE — 0DB68ZX EXCISION OF STOMACH, VIA NATURAL OR ARTIFICIAL OPENING ENDOSCOPIC, DIAGNOSTIC: ICD-10-PCS | Performed by: INTERNAL MEDICINE

## 2021-03-05 PROCEDURE — 88305 TISSUE EXAM BY PATHOLOGIST: CPT

## 2021-03-05 PROCEDURE — 0W3P8ZZ CONTROL BLEEDING IN GASTROINTESTINAL TRACT, VIA NATURAL OR ARTIFICIAL OPENING ENDOSCOPIC: ICD-10-PCS | Performed by: INTERNAL MEDICINE

## 2021-03-05 PROCEDURE — 500066 HCHG BITE BLOCK, ECT: Performed by: INTERNAL MEDICINE

## 2021-03-05 PROCEDURE — 80048 BASIC METABOLIC PNL TOTAL CA: CPT | Mod: 91

## 2021-03-05 PROCEDURE — 90935 HEMODIALYSIS ONE EVALUATION: CPT

## 2021-03-05 PROCEDURE — 85027 COMPLETE CBC AUTOMATED: CPT

## 2021-03-05 PROCEDURE — 160202 HCHG ENDO MINUTES - 1ST 30 MINS LEVEL 3: Performed by: INTERNAL MEDICINE

## 2021-03-05 PROCEDURE — 700111 HCHG RX REV CODE 636 W/ 250 OVERRIDE (IP): Performed by: ANESTHESIOLOGY

## 2021-03-05 PROCEDURE — 99233 SBSQ HOSP IP/OBS HIGH 50: CPT | Performed by: INTERNAL MEDICINE

## 2021-03-05 PROCEDURE — 770020 HCHG ROOM/CARE - TELE (206)

## 2021-03-05 PROCEDURE — 700101 HCHG RX REV CODE 250: Performed by: ANESTHESIOLOGY

## 2021-03-05 PROCEDURE — 99233 SBSQ HOSP IP/OBS HIGH 50: CPT | Performed by: STUDENT IN AN ORGANIZED HEALTH CARE EDUCATION/TRAINING PROGRAM

## 2021-03-05 PROCEDURE — 88312 SPECIAL STAINS GROUP 1: CPT

## 2021-03-05 PROCEDURE — 160207 HCHG ENDO MINUTES - EA ADDL 1 MIN LEVEL 3: Performed by: INTERNAL MEDICINE

## 2021-03-05 PROCEDURE — 700102 HCHG RX REV CODE 250 W/ 637 OVERRIDE(OP): Performed by: STUDENT IN AN ORGANIZED HEALTH CARE EDUCATION/TRAINING PROGRAM

## 2021-03-05 PROCEDURE — C9113 INJ PANTOPRAZOLE SODIUM, VIA: HCPCS | Performed by: INTERNAL MEDICINE

## 2021-03-05 PROCEDURE — 5A1D70Z PERFORMANCE OF URINARY FILTRATION, INTERMITTENT, LESS THAN 6 HOURS PER DAY: ICD-10-PCS | Performed by: INTERNAL MEDICINE

## 2021-03-05 PROCEDURE — 160035 HCHG PACU - 1ST 60 MINS PHASE I: Performed by: INTERNAL MEDICINE

## 2021-03-05 PROCEDURE — 700111 HCHG RX REV CODE 636 W/ 250 OVERRIDE (IP): Performed by: INTERNAL MEDICINE

## 2021-03-05 PROCEDURE — A9270 NON-COVERED ITEM OR SERVICE: HCPCS | Performed by: STUDENT IN AN ORGANIZED HEALTH CARE EDUCATION/TRAINING PROGRAM

## 2021-03-05 PROCEDURE — 700111 HCHG RX REV CODE 636 W/ 250 OVERRIDE (IP): Performed by: STUDENT IN AN ORGANIZED HEALTH CARE EDUCATION/TRAINING PROGRAM

## 2021-03-05 RX ORDER — TRAMADOL HYDROCHLORIDE 50 MG/1
50 TABLET ORAL EVERY 6 HOURS PRN
Status: DISCONTINUED | OUTPATIENT
Start: 2021-03-05 | End: 2021-03-06 | Stop reason: HOSPADM

## 2021-03-05 RX ORDER — HALOPERIDOL 5 MG/ML
1 INJECTION INTRAMUSCULAR
Status: DISCONTINUED | OUTPATIENT
Start: 2021-03-05 | End: 2021-03-05 | Stop reason: HOSPADM

## 2021-03-05 RX ORDER — ESMOLOL HYDROCHLORIDE 10 MG/ML
INJECTION INTRAVENOUS PRN
Status: DISCONTINUED | OUTPATIENT
Start: 2021-03-05 | End: 2021-03-05 | Stop reason: SURG

## 2021-03-05 RX ORDER — MIDAZOLAM HYDROCHLORIDE 1 MG/ML
1 INJECTION INTRAMUSCULAR; INTRAVENOUS
Status: DISCONTINUED | OUTPATIENT
Start: 2021-03-05 | End: 2021-03-05 | Stop reason: HOSPADM

## 2021-03-05 RX ORDER — SODIUM CHLORIDE 9 MG/ML
INJECTION, SOLUTION INTRAVENOUS
Status: DISCONTINUED | OUTPATIENT
Start: 2021-03-05 | End: 2021-03-05 | Stop reason: SURG

## 2021-03-05 RX ORDER — METOCLOPRAMIDE 10 MG/1
10 TABLET ORAL
Status: DISCONTINUED | OUTPATIENT
Start: 2021-03-05 | End: 2021-03-06 | Stop reason: HOSPADM

## 2021-03-05 RX ORDER — OMEPRAZOLE 20 MG/1
40 CAPSULE, DELAYED RELEASE ORAL DAILY
Status: DISCONTINUED | OUTPATIENT
Start: 2021-03-05 | End: 2021-03-06 | Stop reason: HOSPADM

## 2021-03-05 RX ORDER — MIDAZOLAM HYDROCHLORIDE 1 MG/ML
INJECTION INTRAMUSCULAR; INTRAVENOUS PRN
Status: DISCONTINUED | OUTPATIENT
Start: 2021-03-05 | End: 2021-03-05 | Stop reason: SURG

## 2021-03-05 RX ORDER — HYDRALAZINE HYDROCHLORIDE 20 MG/ML
5 INJECTION INTRAMUSCULAR; INTRAVENOUS
Status: DISCONTINUED | OUTPATIENT
Start: 2021-03-05 | End: 2021-03-05 | Stop reason: HOSPADM

## 2021-03-05 RX ORDER — MORPHINE SULFATE 4 MG/ML
3 INJECTION, SOLUTION INTRAMUSCULAR; INTRAVENOUS EVERY 4 HOURS PRN
Status: DISCONTINUED | OUTPATIENT
Start: 2021-03-05 | End: 2021-03-06 | Stop reason: HOSPADM

## 2021-03-05 RX ORDER — SODIUM CHLORIDE 9 MG/ML
INJECTION, SOLUTION INTRAVENOUS CONTINUOUS
Status: DISCONTINUED | OUTPATIENT
Start: 2021-03-05 | End: 2021-03-05 | Stop reason: HOSPADM

## 2021-03-05 RX ORDER — OXYCODONE HYDROCHLORIDE 5 MG/1
5 TABLET ORAL EVERY 4 HOURS PRN
Status: DISCONTINUED | OUTPATIENT
Start: 2021-03-05 | End: 2021-03-06 | Stop reason: HOSPADM

## 2021-03-05 RX ORDER — ONDANSETRON 2 MG/ML
4 INJECTION INTRAMUSCULAR; INTRAVENOUS
Status: COMPLETED | OUTPATIENT
Start: 2021-03-05 | End: 2021-03-05

## 2021-03-05 RX ORDER — DIPHENHYDRAMINE HYDROCHLORIDE 50 MG/ML
12.5 INJECTION INTRAMUSCULAR; INTRAVENOUS
Status: DISCONTINUED | OUTPATIENT
Start: 2021-03-05 | End: 2021-03-05 | Stop reason: HOSPADM

## 2021-03-05 RX ADMIN — METOCLOPRAMIDE 10 MG: 10 TABLET ORAL at 17:00

## 2021-03-05 RX ADMIN — ONDANSETRON HYDROCHLORIDE 4 MG: 2 SOLUTION INTRAMUSCULAR; INTRAVENOUS at 05:07

## 2021-03-05 RX ADMIN — MORPHINE SULFATE 3 MG: 4 INJECTION INTRAVENOUS at 16:41

## 2021-03-05 RX ADMIN — HYDRALAZINE HYDROCHLORIDE 5 MG: 20 INJECTION INTRAMUSCULAR; INTRAVENOUS at 14:23

## 2021-03-05 RX ADMIN — ATENOLOL 25 MG: 25 TABLET ORAL at 17:54

## 2021-03-05 RX ADMIN — HYDROXYCHLOROQUINE SULFATE 200 MG: 200 TABLET, FILM COATED ORAL at 17:51

## 2021-03-05 RX ADMIN — SODIUM CHLORIDE 8 MG/HR: 9 INJECTION, SOLUTION INTRAVENOUS at 00:06

## 2021-03-05 RX ADMIN — SODIUM CHLORIDE: 9 INJECTION, SOLUTION INTRAVENOUS at 13:31

## 2021-03-05 RX ADMIN — ALPRAZOLAM 0.25 MG: 0.25 TABLET ORAL at 16:42

## 2021-03-05 RX ADMIN — ESMOLOL HYDROCHLORIDE 20 MG: 10 INJECTION, SOLUTION INTRAVENOUS at 13:58

## 2021-03-05 RX ADMIN — EPOETIN ALFA-EPBX 10000 UNITS: 10000 INJECTION, SOLUTION INTRAVENOUS; SUBCUTANEOUS at 08:21

## 2021-03-05 RX ADMIN — ONDANSETRON 4 MG: 2 INJECTION INTRAMUSCULAR; INTRAVENOUS at 14:23

## 2021-03-05 RX ADMIN — FENTANYL CITRATE 50 MCG: 50 INJECTION, SOLUTION INTRAMUSCULAR; INTRAVENOUS at 14:24

## 2021-03-05 RX ADMIN — SODIUM CHLORIDE 8 MG/HR: 9 INJECTION, SOLUTION INTRAVENOUS at 09:56

## 2021-03-05 RX ADMIN — PROPOFOL 150 MCG/KG/MIN: 10 INJECTION, EMULSION INTRAVENOUS at 13:36

## 2021-03-05 RX ADMIN — OMEPRAZOLE 40 MG: 20 CAPSULE, DELAYED RELEASE ORAL at 17:51

## 2021-03-05 RX ADMIN — SENNOSIDES AND DOCUSATE SODIUM 2 TABLET: 8.6; 5 TABLET ORAL at 17:50

## 2021-03-05 RX ADMIN — FENTANYL CITRATE 50 MCG: 50 INJECTION, SOLUTION INTRAMUSCULAR; INTRAVENOUS at 14:15

## 2021-03-05 RX ADMIN — FENTANYL CITRATE 100 MCG: 50 INJECTION, SOLUTION INTRAMUSCULAR; INTRAVENOUS at 13:38

## 2021-03-05 RX ADMIN — AMLODIPINE BESYLATE 10 MG: 5 TABLET ORAL at 17:54

## 2021-03-05 RX ADMIN — MIDAZOLAM HYDROCHLORIDE 2 MG: 1 INJECTION, SOLUTION INTRAMUSCULAR; INTRAVENOUS at 13:29

## 2021-03-05 RX ADMIN — EPOETIN ALFA-EPBX 2000 UNITS: 2000 INJECTION, SOLUTION INTRAVENOUS; SUBCUTANEOUS at 08:21

## 2021-03-05 RX ADMIN — ONDANSETRON HYDROCHLORIDE 4 MG: 2 SOLUTION INTRAMUSCULAR; INTRAVENOUS at 11:48

## 2021-03-05 RX ADMIN — ACETAMINOPHEN 500 MG: 500 TABLET, FILM COATED ORAL at 00:06

## 2021-03-05 RX ADMIN — ESMOLOL HYDROCHLORIDE 10 MG: 10 INJECTION, SOLUTION INTRAVENOUS at 13:49

## 2021-03-05 RX ADMIN — ESMOLOL HYDROCHLORIDE 20 MG: 10 INJECTION, SOLUTION INTRAVENOUS at 13:47

## 2021-03-05 ASSESSMENT — ENCOUNTER SYMPTOMS
SHORTNESS OF BREATH: 0
DIZZINESS: 0
EYE PAIN: 0
FEVER: 0
INSOMNIA: 0
BLURRED VISION: 0
HEADACHES: 0
VOMITING: 0
CHILLS: 0
BACK PAIN: 0
SENSORY CHANGE: 0
PALPITATIONS: 0
NAUSEA: 0
ABDOMINAL PAIN: 0
FOCAL WEAKNESS: 0
COUGH: 0

## 2021-03-05 ASSESSMENT — PAIN DESCRIPTION - PAIN TYPE
TYPE: ACUTE PAIN
TYPE: ACUTE PAIN
TYPE: ACUTE PAIN;CHRONIC PAIN
TYPE: ACUTE PAIN
TYPE: ACUTE PAIN;CHRONIC PAIN
TYPE: ACUTE PAIN
TYPE: ACUTE PAIN

## 2021-03-05 ASSESSMENT — LIFESTYLE VARIABLES: SUBSTANCE_ABUSE: 0

## 2021-03-05 ASSESSMENT — FIBROSIS 4 INDEX: FIB4 SCORE: 2.28

## 2021-03-05 ASSESSMENT — PAIN SCALES - GENERAL: PAIN_LEVEL: 5

## 2021-03-05 NOTE — ANESTHESIA PREPROCEDURE EVALUATION
Relevant Problems   CARDIAC   (+) Arteriovenous fistula, acquired (HCC)   (+) Hemorrhagic gastritis with hematemesis    (+) Hypertension   (+) Renovascular hypertension      GI   (+) Gastroesophageal reflux disease without esophagitis         (+) Chronic kidney disease (CKD) stage G5/A1, glomerular filtration rate (GFR) less than or equal to 15 mL/min/1.73 square meter and albuminuria creatinine ratio less than 30 mg/g (HCC)   (+) ESRD (end stage renal disease) on dialysis (HCC)       Physical Exam    Airway   Mallampati: II  TM distance: >3 FB  Neck ROM: full       Cardiovascular - normal exam  Rhythm: regular  Rate: normal  (-) murmur     Dental - normal exam           Pulmonary - normal exam  Breath sounds clear to auscultation     Abdominal    Neurological - normal exam         Other findings: Right IJ in place          Lupus  Anesthesia Plan    ASA 3   ASA physical status 3 criteria: ESRD undergoing regularly scheduled dialysis and other (comment)    Plan - MAC                         Informed Consent:

## 2021-03-05 NOTE — OR NURSING
1402 Pt arrived from OR post GASTROSCOPY - W/HEMOSTASIS, report received. Pt breathing unlabored with clear lung sounds bilat.    1432 GI MD @ BS to update Pt.    1454 Pt states pain is controled and tolerable, denies nausea. Pt tolerating PO fluids. Report to Tessa MARTINEZ.    1540 Pt taken to room by transport in stable condition on 3 lpm O2 via NC with portable tank @ full psi

## 2021-03-05 NOTE — ANESTHESIA TIME REPORT
Anesthesia Start and Stop Event Times     Date Time Event    3/5/2021 1330 Ready for Procedure     1331 Anesthesia Start     1405 Anesthesia Stop        Responsible Staff  03/05/21    Name Role Begin End    Nickie Brown M.D. Anesth 1331 1405        Preop Diagnosis (Free Text):  Pre-op Diagnosis     Hematemesis        Preop Diagnosis (Codes):    Post op Diagnosis  GI bleed      Premium Reason  Non-Premium    Comments:

## 2021-03-05 NOTE — FLOWSHEET NOTE
Telemetry Shift Summary     Rhythm: SR  HR Range:   Ectopy:   Measurements: .18/.08/.38                Normal Values  Rhythm SR  HR Range    Measurements 0.12-0.20 / 0.06-0.10  / 0.30-0.52

## 2021-03-05 NOTE — PROCEDURES
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    Consent: Risks, benefits, and alternatives of aforementioned procedures were discussed with patient in detail before proceeding.  Patient was given opportunities to ask questions and discuss other options.  Risks including but not limited to perforation, infection, bleeding, missed lesion(s), possible need for surgery(ies) and/or interventional radiology, possible need for repeat procedure(s) and/or additional testing, hospitalization possibly prolonged, cardiac and/or pulmonary event, aspiration, hypoxia, stroke, medication (s) and/or anesthesia reaction(s), indefinite diagnosis, discomfort/pain, unsuccessful and/or incomplete procedure, ineffective therapy, persistent symptoms, damage to adjacent organs/structure and/or vascular, and other adverse events possibly life-threatening.  Interactive discussion was undertaken with Layman's terms.  I answered questions in full and to satisfaction.  I gave opportunity to cancel.  Patient stated understanding and acceptance of these risks, and wished to proceed.  I gave opportunity to cancel, reschedule and/or delay.  Informed consent was given in clear state of mind and paper permit was confirmed to have been signed before proceeding.    Endoscopic procedures in  detail:  Diagnostic flexible Olympus endoscope was inserted from mouth into second portion of the duodenum, retroflexion was performed in the stomach.  Gastric and duodenal biopsies were obtained and sent to pathology in separate containers.  5mL of diluted epinephrine was injected submucosa into bleeding Karen Keyes tear and then one hemoclip was applied with good hemostasis.  I suctioned insufflated air and stomach fluid contents upon removal.      Procedure times:  - In-room 13:30  - Start 13:38  - Completed 13:50  - Out of room per nursing records    Esophagogastroduodenoscopy Findings:  - Esophagus: Karen Keyes tear just distal to gastroesophageal junction, posterior wall, injected with 5mL of epinephrine and hemoclip applied.  - Stomach: retained food suggestive of gastroparesis, non-erosive gastritis biopsied to evaluate H.pylori status.  - Duodenum: endoscopically unremarkable to 2nd portion, duodenal biopsies obtained to evaluate for celiac sprue.    Impression:  1. Karen Ming tear syndrome bleed, hemostasis achieved.  2. Gastroparesis from kidney disease and Lupus    Recommendations:   1.  Routine post-endoscopy anesthesia recovery care.  Transfer patient back to prior hospital room under care of Dr. Jorge Carroll when she is awake, alert and comfortable, when recovery criteria are met.  Aspiration and fall precautions x 24 hours.   2. Prilosec 40mg PO QD x 21 days.  3. Added reglan 10mg PO TID with meals with drug holiday first 5 days of every month, warned patient of risks of tardive dyskinesia in layman's terms.  4. GIC signed off but please feel free to call us with questions, concerns and/or problems.  5. Follow-up biopsies with established GI Dr. Kam in about 2 to 4 weeks.  6. I spoke to patient and recovery nurse about impression, diagnosis and recommendations.  I answered questions in full and to satisfaction

## 2021-03-05 NOTE — PROGRESS NOTES
Hemodialysis treatment ordered today per 3 hours.   Treatment initiated at 0809 ended at 1110.   Due medication given accordingly.    Patient tolerated tx; see paper flow sheet for details.      Net UF 2500 mL.      Needles removed from access site. Dressings applied and sites held x 15 minutes; verified no bleeding. Positive bruit/thrill post tx.   Staff RN to monitor AVF/G for breakthrough bleeding. Should breakthrough bleeding occur, staff RN to apply pressure to access sites until bleeding resolved.   Notify Dialysis and Nephrologist for follow-up.

## 2021-03-05 NOTE — ANESTHESIA POSTPROCEDURE EVALUATION
Patient: Lily Nicole    Procedure Summary     Date: 03/05/21 Room / Location:  ENDOSCOPIC ULTRASOUND ROOM / SURGERY [facility]    Anesthesia Start: 1331 Anesthesia Stop: 1405    Procedure: GASTROSCOPY - W/HEMOSTASIS (Abdomen) Diagnosis: (Hematemesis)    Surgeons: Ej Silva M.D. Responsible Provider: Nickie Brown M.D.    Anesthesia Type: MAC ASA Status: 3          Final Anesthesia Type: MAC  Last vitals  BP   Blood Pressure: 146/93    Temp   36.6 °C (97.9 °F)    Pulse   (!) 104   Resp   16    SpO2   100 %      Anesthesia Post Evaluation    Patient location during evaluation: PACU  Patient participation: complete - patient participated  Level of consciousness: awake and alert  Pain score: 5    Airway patency: patent  Anesthetic complications: no  Cardiovascular status: hemodynamically stable  Respiratory status: acceptable  Hydration status: euvolemic    PONV: none          No complications documented.     Nurse Pain Score: 5 (NPRS)

## 2021-03-05 NOTE — PROGRESS NOTES
Nephrology Daily Progress Note    Date of Service  3/5/2021    Chief Complaint  25 y.o. female with lupus, ESRD on dialysis Monday Wednesday Friday who presented 3/4/2021 with hematemesis.    Interval Problem Update  3/5 -patient had dialysis this morning with 2.5 L removed.  Patient had EGD this afternoon with Karen-Keyes tear found, treated endoscopically.  Patient feels well, ready to go home.  Denies chest pain, shortness of breath.    Review of Systems  Review of Systems   Constitutional: Negative for fever.   Respiratory: Negative for shortness of breath.    Cardiovascular: Negative for chest pain.   Gastrointestinal: Negative for abdominal pain.   All other systems reviewed and are negative.       Physical Exam  Temp:  [36.3 °C (97.4 °F)-37 °C (98.6 °F)] 36.6 °C (97.9 °F)  Pulse:  [] 104  Resp:  [16-18] 16  BP: (125-198)/() 146/93  SpO2:  [100 %] 100 %    Physical Exam   Constitutional: She is oriented to person, place, and time. She appears well-developed. No distress.   HENT:   Mouth/Throat: Oropharynx is clear and moist. No oropharyngeal exudate.   Eyes: EOM are normal. No scleral icterus.   Neck: No tracheal deviation present.   Cardiovascular: Normal heart sounds.   No murmur heard.  Pulmonary/Chest: Effort normal. No stridor. She has no rales.   Abdominal: Soft. There is no abdominal tenderness.   Musculoskeletal:         General: No edema. Normal range of motion.   Neurological: She is alert and oriented to person, place, and time.   Skin: Skin is warm and dry.   Psychiatric: She has a normal mood and affect.   Access: Right brachiocephalic AV fistula, patent bruit and thrill      Fluids    Intake/Output Summary (Last 24 hours) at 3/5/2021 1543  Last data filed at 3/5/2021 1405  Gross per 24 hour   Intake 660.42 ml   Output 301 ml   Net 359.42 ml       Laboratory  Labs reviewed, pertinent labs below.  Recent Labs     03/04/21  0147 03/04/21  1140 03/04/21  1950 03/05/21  8607  03/05/21  1304   WBC 2.9*  --   --  2.0*  --    RBC 2.34*  --   --  2.90*  --    HEMOGLOBIN 5.9*   < > 8.5* 7.4* 8.9*   HEMATOCRIT 20.7*  --   --  25.5*  --    MCV 88.5  --   --  87.9  --    MCH 25.2*  --   --  25.5*  --    MCHC 28.5*  --   --  29.0*  --    RDW 63.1*  --   --  62.3*  --    PLATELETCT 89*  --   --  85*  --    MPV 10.8  --   --  12.2  --     < > = values in this interval not displayed.     Recent Labs     03/04/21  0147 03/05/21  0427 03/05/21  1304   SODIUM 131* 129* 134*   POTASSIUM 3.7 4.4 3.4*   CHLORIDE 90* 91* 93*   CO2 32 29 29   GLUCOSE 84 74 78   BUN 21 27* 9   CREATININE 4.01* 5.58* 2.68*   CALCIUM 8.3* 8.5 8.5     Recent Labs     03/04/21  0147   APTT 33.4   INR 1.18*           URINALYSIS:  Lab Results   Component Value Date/Time    COLORURINE Yellow 02/15/2021 1108    CLARITY Clear 02/15/2021 1108    SPECGRAVITY 1.015 02/15/2021 1108    PHURINE 8.5 (A) 02/15/2021 1108    KETONES Negative 02/15/2021 1108    PROTEINURIN 30 (A) 02/15/2021 1108    BILIRUBINUR Negative 02/15/2021 1108    UROBILU 0.2 07/16/2020 0900    NITRITE Negative 02/15/2021 1108    LEUKESTERAS Negative 02/15/2021 1108    OCCULTBLOOD Trace (A) 02/15/2021 1108     UPC  Lab Results   Component Value Date/Time    TOTPROTUR 45.0 (H) 07/16/2020 0900      Lab Results   Component Value Date/Time    CREATININEU 18.93 07/16/2020 0900         Imaging interpreted by radiologist. Imaging reports reviewed with pertinent findings below  No orders to display         Current Facility-Administered Medications   Medication Dose Route Frequency Provider Last Rate Last Admin   • [MAR Hold] oxyCODONE immediate-release (ROXICODONE) tablet 5 mg  5 mg Oral Q4HRS PRN Jorge Carroll M.D.       • [MAR Hold] traMADol (ULTRAM) 50 MG tablet 50 mg  50 mg Oral Q6HRS PRN Jorge Carroll M.D.       • haloperidol lactate (HALDOL) injection 1 mg  1 mg Intravenous Q15 MIN PRN Nickie Brown M.D.       • diphenhydrAMINE (BENADRYL) injection 12.5 mg  12.5 mg  Intravenous Q15 MIN PRN Nickie Brown M.D.       • NS infusion   Intravenous Continuous Nickie Brown M.D.       • fentaNYL (SUBLIMAZE) injection 25 mcg  25 mcg Intravenous Q2 MIN PRN Nickie Brown M.D.        Or   • fentaNYL (SUBLIMAZE) injection 50 mcg  50 mcg Intravenous Q2 MIN PRN Nickie Brown M.D.   50 mcg at 03/05/21 1424   • midazolam (Versed) 2 MG/2ML injection 1 mg  1 mg Intravenous Q5 MIN PRN Nickie Brown M.D.       • hydrALAZINE (APRESOLINE) injection 5 mg  5 mg Intravenous Post-Op once Nickie Brown M.D.   5 mg at 03/05/21 1423   • omeprazole (PRILOSEC) capsule 40 mg  40 mg Oral DAILY Ej Silva M.D.       • metoclopramide (REGLAN) tablet 10 mg  10 mg Oral TID AC Ej Silva M.D.       • [MAR Hold] senna-docusate (PERICOLACE or SENOKOT S) 8.6-50 MG per tablet 2 tablet  2 tablet Oral BID ZULEIKA HeartOJg   2 tablet at 03/04/21 1734    And   • [MAR Hold] polyethylene glycol/lytes (MIRALAX) PACKET 1 Packet  1 Packet Oral QDAY PRN ZULEIKA HeartO.        And   • [MAR Hold] magnesium hydroxide (MILK OF MAGNESIA) suspension 30 mL  30 mL Oral QDAY PRN Jorge Styles D.O.        And   • [MAR Hold] bisacodyl (DULCOLAX) suppository 10 mg  10 mg Rectal QDAY PRN ROGE Heart.O.       • [MAR Hold] enalaprilat (VASOTEC) injection 1.25 mg  1.25 mg Intravenous Q6HRS PRN ZULEIKA HeartO.       • [MAR Hold] ondansetron (ZOFRAN) syringe/vial injection 4 mg  4 mg Intravenous Q4HRS PRN ZULEIKA HeartO.   4 mg at 03/05/21 1148   • [MAR Hold] ondansetron (ZOFRAN ODT) dispertab 4 mg  4 mg Oral Q4HRS PRN Jorge R Styles, D.O.       • [MAR Hold] promethazine (PHENERGAN) tablet 12.5-25 mg  12.5-25 mg Oral Q4HRS PRN Jorge Styles D.O.       • [MAR Hold] promethazine (PHENERGAN) suppository 12.5-25 mg  12.5-25 mg Rectal Q4HRS PRN Jorge Styles D.O.       • [MAR Hold] prochlorperazine (COMPAZINE) injection 5-10 mg  5-10 mg Intravenous Q4HRS PRN Jorge Styles D.O.       •  [MAR Hold] hydroxychloroquine (Plaquenil) tablet 200 mg  200 mg Oral Q EVENING Jorge Styles D.O.   200 mg at 03/04/21 1733   • [MAR Hold] NS (BOLUS) infusion 250 mL  250 mL Intravenous DIALYSIS PRN Navin Metz M.D.       • [MAR Hold] lidocaine (XYLOCAINE) 1 % injection 1 mL  1 mL Intradermal PRN Navin Metz M.D.       • [MAR Hold] epoetin (Retacrit) injection (Dialysis use only) 10,000 Units  10,000 Units Intravenous MO, WE + FR Navin Metz M.D.   10,000 Units at 03/05/21 0821   • [MAR Hold] epoetin (Retacrit) injection (Dialysis Use Only) 2,000 Units  2,000 Units Intravenous MO, WE + FR Navin Metz M.D.   2,000 Units at 03/05/21 0821   • [MAR Hold] losartan (COZAAR) tablet 100 mg  100 mg Oral Q DAY Navin Metz M.D.   Stopped at 03/04/21 1015   • [MAR Hold] atenolol (TENORMIN) tablet 25 mg  25 mg Oral Q EVENING Jorge Carroll M.D.   25 mg at 03/04/21 1732   • [MAR Hold] amLODIPine (NORVASC) tablet 10 mg  10 mg Oral Q EVENING Jorge Carroll M.D.   10 mg at 03/04/21 1733   • [MAR Hold] diphenhydrAMINE (BENADRYL) injection 25 mg  25 mg Intravenous Q6HRS PRN Jorge Carroll M.D.   25 mg at 03/04/21 1707   • [MAR Hold] acetaminophen (TYLENOL) tablet 500 mg  500 mg Oral Q6HRS PRN Jorge Carroll M.D.   500 mg at 03/05/21 0006   • [MAR Hold] ALPRAZolam (XANAX) tablet 0.25 mg  0.25 mg Oral BID PRN Jorge Carroll M.D.   0.25 mg at 03/04/21 2138         Assessment/Plan  25 y.o. female with lupus, ESRD on dialysis Monday Wednesday Friday who presented 3/4/2021 with hematemesis.     1.  ESRD on dialysis Monday Wednesday Friday.  Oliguric.  Stable.  Dialysis today per usual schedule.  Avoid nephrotoxins.  Check labs daily.     2.  Access: Right brachiocephalic AV fistula, patent.  Continue right arm precautions.     3.  Acute blood loss anemia, likely from GI bleed.    Found to have Karen-Keyes tear on EGD on 3/5/2021.  Further management per gastroenterology.  Check CBC at least daily.     4.  Anemia of chronic disease.   Persistent.  Continue Epogen 12,000 units thrice weekly with dialysis.    Check CBC at least daily.     5.  Systemic lupus erythematosus.  Patient follows with Dr. Wong for rheumatology.  She says there are plans to start Benlysta soon.    Recommend resume home prednisone 5 mg p.o. daily.     6.  Hypertension, uncontrolled.  Recommend discontinue atenolol and start carvedilol 25 mg p.o. twice daily.    7.  Hyponatremia, persistent.  Limit hypotonic fluids.  Check labs daily.     8.  Pancytopenia, persistent, likely due to systemic lupus.  Check CBC daily.  Follow-up with rheumatology.     Following  OK to discharge from Nephrology standpoint.  There is no need for outpatient nephrology clinic follow up appointment, as the patient will be seen by a nephrologist at their outpatient dialysis center.     Navin Metz MD  Nephrology

## 2021-03-05 NOTE — PROGRESS NOTES
Telemetry Shift Summary     Rhythm SR  HR Range 66-87  Ectopy n/a  Measurements 0.20/0.10/0.36           Normal Values  Rhythm SR  HR Range    Measurements 0.12-0.20 / 0.06-0.10  / 0.30-0.52

## 2021-03-05 NOTE — PROGRESS NOTES
Lab called with critical result of WBC 2.0 at 0455. Critical lab result read back to lab.   Dr. Styles notified of critical lab result at 0458. Critical lab result read back by Dr. Styles.

## 2021-03-05 NOTE — PROGRESS NOTES
Bedside report given to MICHELLE Levin. POC discussed, all questions answered. Safety precautions in place. Care released.

## 2021-03-05 NOTE — PROGRESS NOTES
Patient seen and examined before proceeding with EGD attempted hemostasis, biopsies under anesthesia with possible other endotherapy.  Risks, benefits, and alternatives of aforementioned procedures were discussed with patient in detail before proceeding.  Patient was given opportunities to ask questions and discuss other options.  Risks including but not limited to perforation, infection, bleeding, missed lesion(s), possible need for surgery(ies) and/or interventional radiology, possible need for repeat procedure(s) and/or additional testing, hospitalization possibly prolonged, cardiac and/or pulmonary event, aspiration, hypoxia, stroke, medication (s) and/or anesthesia reaction(s), indefinite diagnosis, discomfort/pain, unsuccessful and/or incomplete procedure, ineffective therapy, persistent symptoms, damage to adjacent organs/structure and/or vascular, and other adverse events possibly life-threatening.  Interactive discussion was undertaken with Layman's terms.  I answered questions in full and to satisfaction.  Patient stated understanding and acceptance of these risks, and wished to proceed.  I gave opportunity to cancel, delay and/or reschedule.  Informed consent was given in clear state of mind and paper permit was confirmed to have been signed before proceeding.

## 2021-03-05 NOTE — PROGRESS NOTES
[facility] Medicine Daily Progress Note    Date of Service  3/5/2021    Chief Complaint  25 y.o. female admitted 3/4/2021 with hematemesis.    Hospital Course  25 y.o. female who presented 3/4/2021 with hematemesis.  Patient states whenever she woke up this morning she had nausea.  She states she was able to go to her hemodialysis and complete this.  She states earlier this afternoon she had vomiting with small amount of blood noted in it.  Patient continued to be nauseous and vomited again, again with blood in her vomit.  Because of this, she presented to the emergency department.  She does have a history of hemorrhagic gastritis, most recently with EGD in November by Dr. Whitney. Patient states she was recently switched to Protonix and has been compliant with this, she states she has also been compliant with her Carafate.  Patient does receive dialysis Monday, Wednesday and Friday.  I did discuss the case including labs with the ER physician.    Interval Problem Update  Admitted yesterday for hematemesis. Patient with hx hemorrhagic gastritis.  Hgb 7.4 today, received additional unit RBCs overnight.  EGD today shows Karen Keyes tear, hemostasis achieved during EGD. Also with gastroparesis. Biopsies taken.  On PPI for 3 weeks, as well as reglan 10 mg TID.  Will monitor for stability, plan to discharge home tomorrow 3/6 if remains clinically stable.  Follow up with GI in 2-4 weeks.  Tramadol for hip pain.    Consultants/Specialty  Gastroenterology    Code Status  Full Code    Disposition  Inpatient, anticipate discharge tomorrow 3/6 if remains clinically stable.    Review of Systems  Review of Systems   Constitutional: Positive for malaise/fatigue. Negative for chills and fever.   Eyes: Negative for blurred vision and pain.   Respiratory: Negative for cough and shortness of breath.    Cardiovascular: Negative for chest pain, palpitations and leg swelling.   Gastrointestinal: Negative for abdominal pain, nausea and  vomiting.   Genitourinary: Negative for dysuria and urgency.   Musculoskeletal: Positive for joint pain. Negative for back pain.   Skin: Negative for itching and rash.   Neurological: Negative for dizziness, sensory change, focal weakness and headaches.   Psychiatric/Behavioral: Negative for substance abuse. The patient does not have insomnia.         Physical Exam  Temp:  [36.3 °C (97.4 °F)-37 °C (98.6 °F)] 36.6 °C (97.9 °F)  Pulse:  [] 98  Resp:  [16-18] 16  BP: (125-198)/() 169/102  SpO2:  [100 %] 100 %    Physical Exam  Vitals reviewed.   Constitutional:       General: She is not in acute distress.     Appearance: She is not diaphoretic.   HENT:      Head: Normocephalic and atraumatic.      Right Ear: External ear normal.      Left Ear: External ear normal.      Nose: Nose normal. No congestion.      Mouth/Throat:      Pharynx: No oropharyngeal exudate or posterior oropharyngeal erythema.   Eyes:      Extraocular Movements: Extraocular movements intact.      Pupils: Pupils are equal, round, and reactive to light.   Cardiovascular:      Rate and Rhythm: Normal rate and regular rhythm.   Pulmonary:      Effort: Pulmonary effort is normal. No respiratory distress.      Breath sounds: Normal breath sounds.   Abdominal:      General: Bowel sounds are normal. There is no distension.      Palpations: Abdomen is soft.      Tenderness: There is no abdominal tenderness.   Musculoskeletal:         General: No swelling. Normal range of motion.      Cervical back: Normal range of motion and neck supple.   Skin:     General: Skin is warm and dry.   Neurological:      General: No focal deficit present.      Mental Status: She is alert and oriented to person, place, and time.      Sensory: No sensory deficit.      Motor: No weakness.   Psychiatric:         Mood and Affect: Mood normal.         Behavior: Behavior normal.         Fluids    Intake/Output Summary (Last 24 hours) at 3/5/2021 1431  Last data filed at  3/5/2021 1405  Gross per 24 hour   Intake 660.42 ml   Output 301 ml   Net 359.42 ml       Laboratory  Recent Labs     03/04/21  0147 03/04/21  1140 03/04/21  1950 03/05/21 0427 03/05/21  1304   WBC 2.9*  --   --  2.0*  --    RBC 2.34*  --   --  2.90*  --    HEMOGLOBIN 5.9*   < > 8.5* 7.4* 8.9*   HEMATOCRIT 20.7*  --   --  25.5*  --    MCV 88.5  --   --  87.9  --    MCH 25.2*  --   --  25.5*  --    MCHC 28.5*  --   --  29.0*  --    RDW 63.1*  --   --  62.3*  --    PLATELETCT 89*  --   --  85*  --    MPV 10.8  --   --  12.2  --     < > = values in this interval not displayed.     Recent Labs     03/04/21 0147 03/05/21 0427 03/05/21  1304   SODIUM 131* 129* 134*   POTASSIUM 3.7 4.4 3.4*   CHLORIDE 90* 91* 93*   CO2 32 29 29   GLUCOSE 84 74 78   BUN 21 27* 9   CREATININE 4.01* 5.58* 2.68*   CALCIUM 8.3* 8.5 8.5     Recent Labs     03/04/21 0147   APTT 33.4   INR 1.18*               Imaging  No orders to display        Assessment/Plan  GIB (gastrointestinal bleeding)- (present on admission)  Assessment & Plan  -Upper GI bleed, bright red blood in the vomitus  -In November she did receive an EGD, did have hemorrhagic gastritis which was treated with argon plasma coagulation by Dr. Whitney  -Profound anemia at baseline due to chronic renal failure however she has lost greater than a point from her baseline  EGD 3/5 shows Karen Keyes tear, hemostasis achieved during EGD, as well as gastroparesis. Biopsies taken.  On PPI and reglan.  Follow up with GI in 2-4 weeks.  Monitor H/H, transfuse for Hgb<7.    Acute on chronic anemia- (present on admission)  Assessment & Plan  -Acute worsening due to GI bleeding, 1 unit of PRBCs is pending, hemoglobin will repeated posttransfusion, continue to transfuse for hemoglobin less than 7  She does have pancytopenia, white blood cells and platelets are close to her baseline  -Repeat CBC in the morning    Lupus (HCC)- (present on admission)  Assessment & Plan  -Continue home Plaquenil  mycophenolate  -Hold home prednisone until gastritis is improved  -No acute flare    ESRD (end stage renal disease) on dialysis (HCC)- (present on admission)  Assessment & Plan  -She does get hemodialysis Monday, Wednesday and Friday       VTE prophylaxis: SCDs

## 2021-03-06 VITALS
DIASTOLIC BLOOD PRESSURE: 96 MMHG | BODY MASS INDEX: 21.14 KG/M2 | TEMPERATURE: 98.5 F | HEIGHT: 67 IN | OXYGEN SATURATION: 93 % | HEART RATE: 94 BPM | RESPIRATION RATE: 18 BRPM | WEIGHT: 134.7 LBS | SYSTOLIC BLOOD PRESSURE: 145 MMHG

## 2021-03-06 LAB
ANION GAP SERPL CALC-SCNC: 11 MMOL/L (ref 7–16)
BASOPHILS # BLD AUTO: 0.3 % (ref 0–1.8)
BASOPHILS # BLD: 0.01 K/UL (ref 0–0.12)
BUN SERPL-MCNC: 15 MG/DL (ref 8–22)
CALCIUM SERPL-MCNC: 8.3 MG/DL (ref 8.4–10.2)
CHLORIDE SERPL-SCNC: 92 MMOL/L (ref 96–112)
CO2 SERPL-SCNC: 28 MMOL/L (ref 20–33)
CREAT SERPL-MCNC: 4.21 MG/DL (ref 0.5–1.4)
EOSINOPHIL # BLD AUTO: 0.01 K/UL (ref 0–0.51)
EOSINOPHIL NFR BLD: 0.3 % (ref 0–6.9)
ERYTHROCYTE [DISTWIDTH] IN BLOOD BY AUTOMATED COUNT: 61.7 FL (ref 35.9–50)
GLUCOSE SERPL-MCNC: 89 MG/DL (ref 65–99)
HCT VFR BLD AUTO: 27.2 % (ref 37–47)
HGB BLD-MCNC: 7.7 G/DL (ref 12–16)
IMM GRANULOCYTES # BLD AUTO: 0.01 K/UL (ref 0–0.11)
IMM GRANULOCYTES NFR BLD AUTO: 0.3 % (ref 0–0.9)
LYMPHOCYTES # BLD AUTO: 0.42 K/UL (ref 1–4.8)
LYMPHOCYTES NFR BLD: 14 % (ref 22–41)
MCH RBC QN AUTO: 25.4 PG (ref 27–33)
MCHC RBC AUTO-ENTMCNC: 28.3 G/DL (ref 33.6–35)
MCV RBC AUTO: 89.8 FL (ref 81.4–97.8)
MONOCYTES # BLD AUTO: 0.2 K/UL (ref 0–0.85)
MONOCYTES NFR BLD AUTO: 6.7 % (ref 0–13.4)
NEUTROPHILS # BLD AUTO: 2.34 K/UL (ref 2–7.15)
NEUTROPHILS NFR BLD: 78.4 % (ref 44–72)
NRBC # BLD AUTO: 0 K/UL
NRBC BLD-RTO: 0 /100 WBC
PLATELET # BLD AUTO: 65 K/UL (ref 164–446)
PMV BLD AUTO: 11.2 FL (ref 9–12.9)
POTASSIUM SERPL-SCNC: 3.4 MMOL/L (ref 3.6–5.5)
RBC # BLD AUTO: 3.03 M/UL (ref 4.2–5.4)
SODIUM SERPL-SCNC: 131 MMOL/L (ref 135–145)
WBC # BLD AUTO: 3 K/UL (ref 4.8–10.8)

## 2021-03-06 PROCEDURE — 700102 HCHG RX REV CODE 250 W/ 637 OVERRIDE(OP): Performed by: INTERNAL MEDICINE

## 2021-03-06 PROCEDURE — A9270 NON-COVERED ITEM OR SERVICE: HCPCS | Performed by: INTERNAL MEDICINE

## 2021-03-06 PROCEDURE — 700102 HCHG RX REV CODE 250 W/ 637 OVERRIDE(OP): Performed by: STUDENT IN AN ORGANIZED HEALTH CARE EDUCATION/TRAINING PROGRAM

## 2021-03-06 PROCEDURE — 99239 HOSP IP/OBS DSCHRG MGMT >30: CPT | Performed by: STUDENT IN AN ORGANIZED HEALTH CARE EDUCATION/TRAINING PROGRAM

## 2021-03-06 PROCEDURE — A9270 NON-COVERED ITEM OR SERVICE: HCPCS | Performed by: STUDENT IN AN ORGANIZED HEALTH CARE EDUCATION/TRAINING PROGRAM

## 2021-03-06 PROCEDURE — 85025 COMPLETE CBC W/AUTO DIFF WBC: CPT

## 2021-03-06 PROCEDURE — 80048 BASIC METABOLIC PNL TOTAL CA: CPT

## 2021-03-06 PROCEDURE — 700111 HCHG RX REV CODE 636 W/ 250 OVERRIDE (IP): Performed by: STUDENT IN AN ORGANIZED HEALTH CARE EDUCATION/TRAINING PROGRAM

## 2021-03-06 PROCEDURE — 700111 HCHG RX REV CODE 636 W/ 250 OVERRIDE (IP): Performed by: INTERNAL MEDICINE

## 2021-03-06 RX ORDER — CARVEDILOL 25 MG/1
25 TABLET ORAL 2 TIMES DAILY WITH MEALS
Status: DISCONTINUED | OUTPATIENT
Start: 2021-03-06 | End: 2021-03-06 | Stop reason: HOSPADM

## 2021-03-06 RX ORDER — CARVEDILOL 25 MG/1
25 TABLET ORAL 2 TIMES DAILY WITH MEALS
Qty: 60 TABLET | Refills: 0 | Status: SHIPPED | OUTPATIENT
Start: 2021-03-06 | End: 2021-04-05

## 2021-03-06 RX ORDER — OMEPRAZOLE 40 MG/1
40 CAPSULE, DELAYED RELEASE ORAL DAILY
Qty: 18 CAPSULE | Refills: 0 | Status: ON HOLD | OUTPATIENT
Start: 2021-03-07 | End: 2021-03-20

## 2021-03-06 RX ORDER — TRAMADOL HYDROCHLORIDE 50 MG/1
50 TABLET ORAL EVERY 6 HOURS PRN
Qty: 28 TABLET | Refills: 0 | Status: SHIPPED | OUTPATIENT
Start: 2021-03-06 | End: 2021-03-20

## 2021-03-06 RX ORDER — METOCLOPRAMIDE 10 MG/1
10 TABLET ORAL
Qty: 75 TABLET | Refills: 0 | Status: SHIPPED | OUTPATIENT
Start: 2021-03-06 | End: 2021-03-31

## 2021-03-06 RX ADMIN — ONDANSETRON HYDROCHLORIDE 4 MG: 2 SOLUTION INTRAMUSCULAR; INTRAVENOUS at 00:15

## 2021-03-06 RX ADMIN — METOCLOPRAMIDE 10 MG: 10 TABLET ORAL at 05:33

## 2021-03-06 RX ADMIN — MORPHINE SULFATE 3 MG: 4 INJECTION INTRAVENOUS at 05:36

## 2021-03-06 RX ADMIN — MORPHINE SULFATE 3 MG: 4 INJECTION INTRAVENOUS at 00:15

## 2021-03-06 RX ADMIN — ALPRAZOLAM 0.25 MG: 0.25 TABLET ORAL at 03:48

## 2021-03-06 RX ADMIN — OMEPRAZOLE 40 MG: 20 CAPSULE, DELAYED RELEASE ORAL at 05:32

## 2021-03-06 RX ADMIN — ONDANSETRON HYDROCHLORIDE 4 MG: 2 SOLUTION INTRAMUSCULAR; INTRAVENOUS at 05:36

## 2021-03-06 RX ADMIN — CARVEDILOL 25 MG: 25 TABLET, FILM COATED ORAL at 10:14

## 2021-03-06 RX ADMIN — LOSARTAN POTASSIUM 100 MG: 25 TABLET, FILM COATED ORAL at 05:33

## 2021-03-06 ASSESSMENT — PAIN DESCRIPTION - PAIN TYPE
TYPE: ACUTE PAIN;CHRONIC PAIN

## 2021-03-06 ASSESSMENT — FIBROSIS 4 INDEX: FIB4 SCORE: 2.39

## 2021-03-06 NOTE — DISCHARGE INSTRUCTIONS
Discharge Instructions    Discharged to home by car with relative. Discharged via wheelchair, hospital escort: Refused.  Special equipment needed: Not Applicable    Be sure to schedule a follow-up appointment with your primary care doctor or any specialists as instructed.     Discharge Plan:   Influenza Vaccine Indication: Not indicated: Previously immunized this influenza season and > 8 years of age    I understand that a diet low in cholesterol, fat, and sodium is recommended for good health. Unless I have been given specific instructions below for another diet, I accept this instruction as my diet prescription.   Other diet: Renal    Special Instructions: None    · Is patient discharged on Warfarin / Coumadin?   No     Depression / Suicide Risk    As you are discharged from this [facility], it is important to learn how to keep safe from harming yourself.    Recognize the warning signs:  · Abrupt changes in personality, positive or negative- including increase in energy   · Giving away possessions  · Change in eating patterns- significant weight changes-  positive or negative  · Change in sleeping patterns- unable to sleep or sleeping all the time   · Unwillingness or inability to communicate  · Depression  · Unusual sadness, discouragement and loneliness  · Talk of wanting to die  · Neglect of personal appearance   · Rebelliousness- reckless behavior  · Withdrawal from people/activities they love  · Confusion- inability to concentrate     If you or a loved one observes any of these behaviors or has concerns about self-harm, here's what you can do:  · Talk about it- your feelings and reasons for harming yourself  · Remove any means that you might use to hurt yourself (examples: pills, rope, extension cords, firearm)  · Get professional help from the community (Mental Health, Substance Abuse, psychological counseling)  · Do not be alone:Call your Safe Contact- someone whom you trust who will be there  for you.  · Call your local CRISIS HOTLINE 468-8563 or 974-641-5398  · Call your local Children's Mobile Crisis Response Team [place] (056) 946-9274 or www.Lanx  · Call the toll free National Suicide Prevention Hotlines   · National Suicide Prevention Lifeline 282-250-WITB (3074)  · [place] Line [facility] 800-SUICIDE (033-3578)      Karen-Keyes Syndrome    Karen-Keyes syndrome refers to bleeding from tears in the lining of the tube that carries food from the mouth to the stomach (esophagus). The tears occur at the entrance to your stomach. Usually the bleeding stops by itself after 24-48 hours. In some cases, surgery may be needed.  What are the causes?  This condition may be caused by severe or long-lasting vomiting or coughing.  What increases the risk?  You are more likely to develop this condition if:  · You abuse or drink too much alcohol.  · You have certain eating disorders, such as eating a lot of food at once and then vomiting to prevent weight gain (bulimia).  What are the signs or symptoms?  Symptoms of this condition include:  · Vomiting bright red or black, coffee-ground-like material.  · Abdominal pain.  · Having black, tarry stools.  · Fainting or experiencing loss of consciousness.  How is this diagnosed?  This condition is diagnosed with an esophagogastroduodenoscopy (EGD). During an EGD procedure, a flexible tube (endoscope) is put into your mouth, passed through your esophagus into your stomach, and then into your small bowel. An EGD will show your health care provider where the tear is.  How is this treated?  Treatment for this condition depends on the severity of the tear or bleeding. It is necessary to stop any bleeding as soon as possible. Treatment includes:  · Medicines to treat nausea.  · Medicines to help the lining of your esophagus heal.  · Having endoscopy to treat an area of bleeding with high heat (coagulation), injections, or surgical clips.  · Receiving  donated blood (transfusion) to replace lost blood. This is done in cases of severe bleeding.  · Having a procedure that involves first doing an angiogram and then blocking blood flow to the bleeding site (embolization).  · Having other surgical procedures if initial treatments do not control bleeding.  In some cases, it may be necessary to treat any conditions that may have caused the tear in the esophagus. This includes:   · Treating long-lasting or recurrent vomiting or coughing.  · Getting help for alcoholism or an eating disorder.  Follow these instructions at home:  · Take over-the-counter and prescription medicines only as told by your health care provider.  · You may be told to stop taking aspirin or ibuprofen as these, and other medicines like them, can cause bleeding.  · Return to your normal activities as told by your health care provider. Ask your health care provider what activities are safe for you.  · Do not use any products that contain nicotine or tobacco, such as cigarettes and e-cigarettes. If you need help quitting, ask your health care provider.  · Follow instructions from your health care provider about eating or drinking restrictions.  · Do not drink alcohol.  · Keep all follow-up visits as told by your health care provider. This is important.  Contact a health care provider if you:  · Have nausea or vomiting.  · Have abdominal pain.  · Have unexplained weight loss.  · Need help to stop smoking or drinking alcohol.  Get help right away if you:  · Have dizziness that does not go away, or you are light-headed or faint.  · Start vomiting again, or you have vomit that is bright red or looks like black coffee grounds.  · Have bloody, black, or tarry stools.  · Have chest pain.  · Cannot eat or drink.  Summary  · Karen-Keyes syndrome refers to bleeding from tears in the lining of the tube that carries food from the mouth to the stomach (esophagus).  · Risk factors include severe or long-lasting  coughing or vomiting, and heavy alcohol use.  · Take over-the-counter and prescription medicines only as told by your health care provider. You may have to avoid certain medicines that cause bleeding.  This information is not intended to replace advice given to you by your health care provider. Make sure you discuss any questions you have with your health care provider.  Document Released: 05/07/2007 Document Revised: 12/31/2018 Document Reviewed: 12/31/2018  Elsevier Patient Education © 2020 [employer]

## 2021-03-06 NOTE — PROGRESS NOTES
Pt DC'd. IV removed, discharge instructions provided to patient, pt verbalizes understanding. Pt states all questions have been answered. Copy of discharge paperwork provided to pt, signed copy in chart. Pt states all belongings in possession. Pt escorted off unit without incident.

## 2021-03-06 NOTE — PROGRESS NOTES
Received bedside patient report from MICHELLE BEATTY. Patient resting comfortably in bed, no complaints at this time. Safety precautions in place. Will continue to monitor.

## 2021-03-06 NOTE — PROGRESS NOTES
Monitor Summary     Rhythm:SR 75   Measurements: 0.16/0.10/0.36  ECTOPIES: n/a        Normal Values  Rhythm SR  HR Range    Measurements 0.12-0.20 / 0.06-0.10  / 0.30-0.52

## 2021-03-06 NOTE — PROGRESS NOTES
Telemetry Shift Summary     Rhythm SR  HR Range 87 - 94  Measurements 0.16 / 0.08 / 0.36           Normal Values  Rhythm SR  HR Range    Measurements 0.12-0.20 / 0.06-0.10  / 0.30-0.52

## 2021-03-07 NOTE — DISCHARGE SUMMARY
Discharge Summary    CHIEF COMPLAINT ON ADMISSION  Chief Complaint   Patient presents with   • Vomiting Blood     x2 episodes, dark brown in color   • Abdominal Pain   • Nausea       Reason for Admission  vomiting Blood     Admission Date  3/4/2021    CODE STATUS  Prior    HPI & HOSPITAL COURSE  This is a 25 y.o. female here with hematemesis. Patient with history of hemorrhagic gastritis, ESRD on hemodialysis. She was found to be acutely anemic below her baseline. She received 1 unit packed red blood cells with good response. She underwent upper endoscopy which revealed Karen Keyes tear, hemostasis achieved intraoperatively; as well as gastroparesis. Gastroenterology recommend proton pump inhibitor for 3 weeks, as well as reglan with monthly drug holiday. Patient received hemodialysis while inpatient, overseen by nephrology. Patient is to follow up with her established gastroenterologist Dr Kam in next 2-4 weeks.    Therefore, she is discharged in fair and stable condition to home with close outpatient follow-up.    The patient met 2-midnight criteria for an inpatient stay at the time of discharge.    Discharge Date  3/6/2021    FOLLOW UP ITEMS POST DISCHARGE  Take medications as prescribed.  Follow up with previously established GI Dr Kam in 2-4 weeks.    DISCHARGE DIAGNOSES  Active Problems:    GIB (gastrointestinal bleeding) POA: Yes    ESRD (end stage renal disease) on dialysis (HCC) POA: Yes    Lupus (HCC) POA: Yes    Acute on chronic anemia POA: Yes  Resolved Problems:    * No resolved hospital problems. *      FOLLOW UP  Future Appointments   Date Time Provider Department Center   3/18/2021 11:00 AM INFUSION QUICK INJECT ON [facility]   3/21/2021  9:00 AM [facility]     Ella Matthew M.D.  [address] [place] [address]  [place] [id number]  [phone]    Schedule an appointment as soon as possible for a visit  Hospital Follow-Up      MEDICATIONS ON DISCHARGE     Medication List       START taking these medications      Instructions   carvedilol 25 MG Tabs  Commonly known as: COREG   Take 1 tablet by mouth 2 times a day, with meals for 30 days.  Dose: 25 mg     metoclopramide 10 MG Tabs  Commonly known as: REGLAN   Take 1 tablet by mouth 3 times a day before meals for 25 days.  Dose: 10 mg     omeprazole 40 MG delayed-release capsule  Commonly known as: PRILOSEC   Take 1 capsule by mouth every day for 18 days.  Dose: 40 mg     traMADol 50 MG Tabs  Commonly known as: ULTRAM   Take 1 tablet by mouth every 6 hours as needed for Moderate Pain for up to 14 days.  Dose: 50 mg        CHANGE how you take these medications      Instructions   sucralfate 1 GM/10ML Susp  What changed:   · when to take this  · additional instructions  Commonly known as: CARAFATE   Doctor's comments: Can give tablets at same dosage if pt unable to afford liquid preparation, quantity sufficient for one month  Take 10 mL by mouth 4 Times a Day,Before Meals and at Bedtime.  Dose: 1 g        CONTINUE taking these medications      Instructions   acetaminophen 500 MG Tabs  Commonly known as: TYLENOL   Take 500 mg by mouth every 6 hours as needed for Moderate Pain.  Dose: 500 mg     amLODIPine 10 MG Tabs  Commonly known as: NORVASC   Take 1 tablet by mouth every evening.  Dose: 10 mg     hydroxychloroquine 200 MG Tabs  Commonly known as: Plaquenil   Take 200 mg by mouth every evening.  Dose: 200 mg     Lasix 40 MG Tabs  Generic drug: furosemide   Take 40 mg by mouth 2 (two) times a day.  Dose: 40 mg     mycophenolate 180 MG EC tablet  Commonly known as: Myfortic   Take 1 Tab by mouth every evening.  Dose: 180 mg     ondansetron 4 MG Tbdp  Commonly known as: ZOFRAN ODT   Take 1 tablet by mouth every four hours as needed for Nausea (give PO if no IV route available).  Dose: 4 mg     pantoprazole 40 MG Tbec  Commonly known as: PROTONIX   Take 1 Tab by mouth 2 times a day.  Dose: 40 mg     predniSONE 5 MG Tabs  Commonly known as:  DELTASONE   Take 5 mg by mouth every evening.  Dose: 5 mg        STOP taking these medications    atenolol 25 MG Tabs  Commonly known as: TENORMIN            Allergies  Allergies   Allergen Reactions   • Cephalexin Rash     .   • Clindamycin Rash     .   • Methylprednisolone Unspecified     Anxious   • Metoprolol Rash     .       DIET  No orders of the defined types were placed in this encounter.      ACTIVITY  As tolerated.  Weight bearing as tolerated    CONSULTATIONS  Gastroenterology  Nephrology    PROCEDURES  EGD  Hemodialysis    LABORATORY  Lab Results   Component Value Date    SODIUM 131 (L) 03/06/2021    POTASSIUM 3.4 (L) 03/06/2021    CHLORIDE 92 (L) 03/06/2021    CO2 28 03/06/2021    GLUCOSE 89 03/06/2021    BUN 15 03/06/2021    CREATININE 4.21 (H) 03/06/2021        Lab Results   Component Value Date    WBC 3.0 (L) 03/06/2021    HEMOGLOBIN 7.7 (L) 03/06/2021    HEMATOCRIT 27.2 (L) 03/06/2021    PLATELETCT 65 (L) 03/06/2021        Total time of the discharge process exceeds 35 minutes.

## 2021-03-08 ENCOUNTER — PATIENT OUTREACH (OUTPATIENT)
Dept: HEALTH INFORMATION MANAGEMENT | Facility: OTHER | Age: 26
End: 2021-03-08

## 2021-03-08 SDOH — ECONOMIC STABILITY: FOOD INSECURITY: WITHIN THE PAST 12 MONTHS, THE FOOD YOU BOUGHT JUST DIDN'T LAST AND YOU DIDN'T HAVE MONEY TO GET MORE.: NEVER TRUE

## 2021-03-08 SDOH — ECONOMIC STABILITY: INCOME INSECURITY: HOW HARD IS IT FOR YOU TO PAY FOR THE VERY BASICS LIKE FOOD, HOUSING, MEDICAL CARE, AND HEATING?: NOT HARD AT ALL

## 2021-03-08 SDOH — ECONOMIC STABILITY: FOOD INSECURITY: WITHIN THE PAST 12 MONTHS, YOU WORRIED THAT YOUR FOOD WOULD RUN OUT BEFORE YOU GOT MONEY TO BUY MORE.: NEVER TRUE

## 2021-03-08 NOTE — PROGRESS NOTES
3/8/21- MELLISSA Hu follow up call to pt post d/c for AVS review, questions and possible referral to [facility] nurse or pharmacist if needed. Missed outreach to pt bedside prior to her d/c. Pt states established with PCP at [facility] and she is currently in process of making her own follow up visit. Pt declined assistance with scheduling, [facility] services at this time. Pt mentions good support from family with whom she lives with. No issues keeping appointments or financial barriers to care. Pt denies need for resources such as food, transportation   or housing. No medical equipment used at home. Pt is confident in ability to manage care post d/c. [facility] contact info left with pt. No further questions or concerns. No need for referral to [facility] nurse or [facility] pharmacist.  Pt will be d/c from [facility] services as all needs met.     Community Health Worker Intake  • Social determinates of health intake completed.   • Identified barriers to none.   • Contact information provided to [facility]. Yes   • Has PCP appointment scheduled for. Pt is currently awaiting call back from [facility] for scheduling.   • Scheduled Food Delivery/Home Visit/Outpatient Visit: Declined   • Accepted/Declined Meds-To-Beds. N/A  • Inpatient/Outpatient assessment completed. Outpatient   • Did the patient receive medications post discharge: Yes

## 2021-03-15 ENCOUNTER — APPOINTMENT (OUTPATIENT)
Dept: RADIOLOGY | Facility: MEDICAL CENTER | Age: 26
End: 2021-03-15
Attending: EMERGENCY MEDICINE
Payer: COMMERCIAL

## 2021-03-15 ENCOUNTER — HOSPITAL ENCOUNTER (EMERGENCY)
Facility: MEDICAL CENTER | Age: 26
End: 2021-03-15
Attending: EMERGENCY MEDICINE
Payer: COMMERCIAL

## 2021-03-15 VITALS
SYSTOLIC BLOOD PRESSURE: 153 MMHG | HEART RATE: 102 BPM | HEIGHT: 67 IN | WEIGHT: 132.7 LBS | DIASTOLIC BLOOD PRESSURE: 121 MMHG | OXYGEN SATURATION: 95 % | RESPIRATION RATE: 19 BRPM | TEMPERATURE: 98.6 F | BODY MASS INDEX: 20.83 KG/M2

## 2021-03-15 DIAGNOSIS — Z99.2 CHRONIC KIDNEY DISEASE WITH END STAGE RENAL FAILURE ON DIALYSIS (HCC): ICD-10-CM

## 2021-03-15 DIAGNOSIS — M54.50 LUMBAR BACK PAIN: ICD-10-CM

## 2021-03-15 DIAGNOSIS — K21.9 GASTROESOPHAGEAL REFLUX DISEASE WITHOUT ESOPHAGITIS: ICD-10-CM

## 2021-03-15 DIAGNOSIS — N18.6 CHRONIC KIDNEY DISEASE WITH END STAGE RENAL FAILURE ON DIALYSIS (HCC): ICD-10-CM

## 2021-03-15 DIAGNOSIS — R60.0 BILATERAL LOWER EXTREMITY EDEMA: ICD-10-CM

## 2021-03-15 DIAGNOSIS — D63.8 ANEMIA OF CHRONIC DISEASE: ICD-10-CM

## 2021-03-15 LAB
ALBUMIN SERPL BCP-MCNC: 2.8 G/DL (ref 3.2–4.9)
ALBUMIN/GLOB SERPL: 0.6 G/DL
ALP SERPL-CCNC: 71 U/L (ref 30–99)
ALT SERPL-CCNC: 6 U/L (ref 2–50)
ANION GAP SERPL CALC-SCNC: 11 MMOL/L (ref 7–16)
ANISOCYTOSIS BLD QL SMEAR: ABNORMAL
AST SERPL-CCNC: 22 U/L (ref 12–45)
BASOPHILS # BLD AUTO: 0.2 % (ref 0–1.8)
BASOPHILS # BLD: 0.01 K/UL (ref 0–0.12)
BILIRUB SERPL-MCNC: 0.4 MG/DL (ref 0.1–1.5)
BUN SERPL-MCNC: 35 MG/DL (ref 8–22)
CALCIUM SERPL-MCNC: 8.5 MG/DL (ref 8.4–10.2)
CHLORIDE SERPL-SCNC: 90 MMOL/L (ref 96–112)
CO2 SERPL-SCNC: 29 MMOL/L (ref 20–33)
COMMENT 1642: NORMAL
CREAT SERPL-MCNC: 6.5 MG/DL (ref 0.5–1.4)
EOSINOPHIL # BLD AUTO: 0.05 K/UL (ref 0–0.51)
EOSINOPHIL NFR BLD: 0.9 % (ref 0–6.9)
ERYTHROCYTE [DISTWIDTH] IN BLOOD BY AUTOMATED COUNT: 62.8 FL (ref 35.9–50)
GLOBULIN SER CALC-MCNC: 4.8 G/DL (ref 1.9–3.5)
GLUCOSE SERPL-MCNC: 90 MG/DL (ref 65–99)
HCT VFR BLD AUTO: 24.4 % (ref 37–47)
HGB BLD-MCNC: 7.1 G/DL (ref 12–16)
HYPOCHROMIA BLD QL SMEAR: ABNORMAL
IMM GRANULOCYTES # BLD AUTO: 0.02 K/UL (ref 0–0.11)
IMM GRANULOCYTES NFR BLD AUTO: 0.4 % (ref 0–0.9)
LYMPHOCYTES # BLD AUTO: 0.5 K/UL (ref 1–4.8)
LYMPHOCYTES NFR BLD: 9.4 % (ref 22–41)
MCH RBC QN AUTO: 25.9 PG (ref 27–33)
MCHC RBC AUTO-ENTMCNC: 29.1 G/DL (ref 33.6–35)
MCV RBC AUTO: 89.1 FL (ref 81.4–97.8)
MICROCYTES BLD QL SMEAR: ABNORMAL
MONOCYTES # BLD AUTO: 0.22 K/UL (ref 0–0.85)
MONOCYTES NFR BLD AUTO: 4.1 % (ref 0–13.4)
NEUTROPHILS # BLD AUTO: 4.51 K/UL (ref 2–7.15)
NEUTROPHILS NFR BLD: 85 % (ref 44–72)
NRBC # BLD AUTO: 0 K/UL
NRBC BLD-RTO: 0 /100 WBC
NT-PROBNP SERPL IA-MCNC: ABNORMAL PG/ML (ref 0–125)
OVALOCYTES BLD QL SMEAR: NORMAL
PLATELET # BLD AUTO: 61 K/UL (ref 164–446)
PLATELET BLD QL SMEAR: NORMAL
POIKILOCYTOSIS BLD QL SMEAR: NORMAL
POLYCHROMASIA BLD QL SMEAR: NORMAL
POTASSIUM SERPL-SCNC: 3.9 MMOL/L (ref 3.6–5.5)
PROT SERPL-MCNC: 7.6 G/DL (ref 6–8.2)
RBC # BLD AUTO: 2.74 M/UL (ref 4.2–5.4)
RBC BLD AUTO: PRESENT
SODIUM SERPL-SCNC: 130 MMOL/L (ref 135–145)
WBC # BLD AUTO: 5.3 K/UL (ref 4.8–10.8)

## 2021-03-15 PROCEDURE — 96374 THER/PROPH/DIAG INJ IV PUSH: CPT

## 2021-03-15 PROCEDURE — 83880 ASSAY OF NATRIURETIC PEPTIDE: CPT

## 2021-03-15 PROCEDURE — 96375 TX/PRO/DX INJ NEW DRUG ADDON: CPT

## 2021-03-15 PROCEDURE — 85025 COMPLETE CBC W/AUTO DIFF WBC: CPT

## 2021-03-15 PROCEDURE — 71045 X-RAY EXAM CHEST 1 VIEW: CPT

## 2021-03-15 PROCEDURE — 80053 COMPREHEN METABOLIC PANEL: CPT

## 2021-03-15 PROCEDURE — 700111 HCHG RX REV CODE 636 W/ 250 OVERRIDE (IP): Performed by: EMERGENCY MEDICINE

## 2021-03-15 PROCEDURE — 99284 EMERGENCY DEPT VISIT MOD MDM: CPT

## 2021-03-15 PROCEDURE — 36415 COLL VENOUS BLD VENIPUNCTURE: CPT

## 2021-03-15 RX ORDER — ONDANSETRON 2 MG/ML
4 INJECTION INTRAMUSCULAR; INTRAVENOUS ONCE
Status: COMPLETED | OUTPATIENT
Start: 2021-03-15 | End: 2021-03-15

## 2021-03-15 RX ORDER — MORPHINE SULFATE 4 MG/ML
2 INJECTION, SOLUTION INTRAMUSCULAR; INTRAVENOUS ONCE
Status: COMPLETED | OUTPATIENT
Start: 2021-03-15 | End: 2021-03-15

## 2021-03-15 RX ORDER — KETOROLAC TROMETHAMINE 30 MG/ML
30 INJECTION, SOLUTION INTRAMUSCULAR; INTRAVENOUS ONCE
Status: DISCONTINUED | OUTPATIENT
Start: 2021-03-15 | End: 2021-03-15

## 2021-03-15 RX ORDER — ONDANSETRON 4 MG/1
4 TABLET, ORALLY DISINTEGRATING ORAL EVERY 6 HOURS PRN
Qty: 10 TABLET | Refills: 0 | Status: ON HOLD | OUTPATIENT
Start: 2021-03-15 | End: 2021-03-20

## 2021-03-15 RX ADMIN — FAMOTIDINE 20 MG: 10 INJECTION INTRAVENOUS at 03:22

## 2021-03-15 RX ADMIN — ONDANSETRON 4 MG: 2 INJECTION INTRAMUSCULAR; INTRAVENOUS at 03:21

## 2021-03-15 RX ADMIN — MORPHINE SULFATE 2 MG: 4 INJECTION INTRAVENOUS at 03:45

## 2021-03-15 ASSESSMENT — FIBROSIS 4 INDEX: FIB4 SCORE: 3.39

## 2021-03-15 NOTE — ED PROVIDER NOTES
"CHIEF COMPLAINT  Chief Complaint   Patient presents with   • Abdominal Pain   • Back Pain       [facility]  Lily JOJO Nicole is a 25 y.o. female who presents tonight with her significant other with a chief complaint of acid reflux, nausea, low back pain, generalized abdominal pain, shortness of breath and swelling in her feet.  She is due to have dialysis today and states that she is much more short of breath than normal.  She denies any fever, chills, productive cough.    REVIEW OF SYSTEMS  See HPI for further details. All other system reviews are negative.    PAST MEDICAL HISTORY  Past Medical History:   Diagnosis Date   • Anemia 01/17/2018   • AVF (arteriovenous fistula) (Formerly [facility])     Right Arm   • Dialysis patient (Formerly [facility])      dialysis, M,W,F Elizabeth/Joni   • ESRD (end stage renal disease) on dialysis (Formerly [facility]) 01/17/2018    Twan Fu   • Heart burn    • Hypertension 01/17/2018    \"Controlled with medication\"   • Indigestion    • Lupus (Formerly [facility])    • Migraines 01/17/2018   • Seizure (Formerly [facility]) 2013    from high blood pressure, reports 1 time event       FAMILY HISTORY  Family History   Problem Relation Age of Onset   • Diabetes Paternal Grandmother        SOCIAL HISTORY  Social History     Socioeconomic History   • Marital status: Single     Spouse name: Not on file   • Number of children: Not on file   • Years of education: Not on file   • Highest education level: Not on file   Occupational History   • Not on file   Tobacco Use   • Smoking status: Never Smoker   • Smokeless tobacco: Never Used   Substance and Sexual Activity   • Alcohol use: No   • Drug use: No   • Sexual activity: Not on file   Other Topics Concern   • Not on file   Social History Narrative   • Not on file     Social Determinants of Health     Financial Resource Strain: Low Risk    • Difficulty of Paying Living Expenses: Not hard at all   Food Insecurity: No Food Insecurity   • Worried About Running Out of Food in the Last Year: Never true   • Ran Out of Food in the " Last Year: Never true   Transportation Needs: No Transportation Needs   • Lack of Transportation (Medical): No   • Lack of Transportation (Non-Medical): No   Physical Activity:    • Days of Exercise per Week:    • Minutes of Exercise per Session:    Stress:    • Feeling of Stress :    Social Connections:    • Frequency of Communication with Friends and Family:    • Frequency of Social Gatherings with Friends and Family:    • Attends Yarsani Services:    • Active Member of Clubs or Organizations:    • Attends Club or Organization Meetings:    • Marital Status:    Intimate Partner Violence:    • Fear of Current or Ex-Partner:    • Emotionally Abused:    • Physically Abused:    • Sexually Abused:        SURGICAL HISTORY  Past Surgical History:   Procedure Laterality Date   • PB UPPER GI ENDOSCOPY,DIAGNOSIS  3/5/2021    Procedure: GASTROSCOPY - W/HEMOSTASIS;  Surgeon: Ej Silva M.D.;  Location: [facility];  Service: Gastroenterology   • PB COLONOSCOPY,DIAGNOSTIC  1/8/2021    Procedure: COLONOSCOPY;  Surgeon: Herbert Contreras M.D.;  Location: [facility];  Service: Gastroenterology   • PB UPPER GI ENDOSCOPY,DIAGNOSIS  1/8/2021    Procedure: GASTROSCOPY;  Surgeon: Herbert Contreras M.D.;  Location: [facility];  Service: Gastroenterology   • PB UPPER GI ENDOSCOPY,CTRL BLEED  1/8/2021    Procedure: GASTROSCOPY, WITH ARGON PLASMA COAGULATION;  Surgeon: Herbert Contreras M.D.;  Location: [facility];  Service: Gastroenterology   • PB UPPER GI ENDOSCOPY,CTRL BLEED  11/12/2020    Procedure: GASTROSCOPY, WITH ARGON PLASMA COAGULATION;  Surgeon: Gadiel Whitney M.D.;  Location: [facility];  Service: Gastroenterology   • PB UPPER GI ENDOSCOPY,BIOPSY  11/12/2020    Procedure: GASTROSCOPY, WITH BIOPSY;  Surgeon: Gadiel Whitney M.D.;  Location: [facility];  Service: Gastroenterology   • GASTROSCOPY-ENDO  11/12/2020    Procedure: GASTROSCOPY;  Surgeon: Gadiel Whitney M.D.;   "Location: SURGERY [facility];  Service: Gastroenterology   • GASTROSCOPY-ENDO  9/18/2020    Procedure: GASTROSCOPY;  Surgeon: Gadiel Whitney M.D.;  Location: SURGERY [facility];  Service: Gastroenterology   • GASTROSCOPY N/A 5/30/2020    Procedure: GASTROSCOPY;  Surgeon: Waylon Mcqueen M.D.;  Location: [facility];  Service: Gastroenterology   • GASTROSCOPY-ENDO  12/9/2019    Procedure: GASTROSCOPY;  Surgeon: Aaron Kam M.D.;  Location: [facility];  Service: Gastroenterology   • ANGIOPLASTY  01/17/2018    \"Right Arm AV-Fistulagram & Angioplastyx3\"   • ULI BY LAPAROSCOPY  4/5/2010    Performed by SYED MARTELL at SURGERY [facility] ORS   • AV FISTULA CREATION Right    • OTHER      renal biopsy x 3   • OTHER      bone marrow biopsy       CURRENT MEDICATIONS  See nurses notes    ALLERGIES  Allergies   Allergen Reactions   • Cephalexin Rash     .   • Clindamycin Rash     .   • Methylprednisolone Unspecified     Anxious   • Metoprolol Rash     .       PHYSICAL EXAM  VITAL SIGNS: /121   Pulse (!) 102   Temp 37 °C (98.6 °F) (Temporal)   Resp 19   Ht 1.702 m (5' 7\")   Wt 60.2 kg (132 lb 11.2 oz)   SpO2 95%   BMI 20.78 kg/m²     Constitutional: Patient is a chronically ill-appearing female, very pale in moderate distress.  HENT: Normocephalic, atraumatic, dry oral mucosa.  Eyes: PERRL, EOMI  Neck: Supple  Normal range of motion in flexion, extension and lateral rotation. No tenderness along the bony prominences.  Cardiovascular: Normal heart rate and rhythm. No murmur  Thorax & Lungs: Diminished air exchange with no rhonchi or wheezing, mild respiratory distress.  Abdomen: Bowel sounds normal in all four quadrants. Soft,nontender, no rebound , guarding, palpable masses.   Skin: Extremely pale and sallow, no rashes.  Back: No cervical, thoracic, or lumbosacral tenderness. No CVA tenderness.   Extremities: Peripheral pulses 4/4 No edema, No tenderness, No " cyanosis, No clubbing.   Musculoskeletal: Normal range of motion in all major joints. No tenderness to palpation or major deformities noted.   Neurologic: Alert & oriented x 3, Normal motor function, Normal sensory function, No lateralizing or focal deficits noted. DTR's 4/4 bilaterally.  Psychiatric: Affect normal, Judgment normal, Mood normal.     EKG  Results for orders placed or performed during the hospital encounter of 03/15/21   CBC WITH DIFFERENTIAL   Result Value Ref Range    WBC 5.3 4.8 - 10.8 K/uL    RBC 2.74 (L) 4.20 - 5.40 M/uL    Hemoglobin 7.1 (L) 12.0 - 16.0 g/dL    Hematocrit 24.4 (L) 37.0 - 47.0 %    MCV 89.1 81.4 - 97.8 fL    MCH 25.9 (L) 27.0 - 33.0 pg    MCHC 29.1 (L) 33.6 - 35.0 g/dL    RDW 62.8 (H) 35.9 - 50.0 fL    Platelet Count 61 (L) 164 - 446 K/uL    Neutrophils-Polys 85.00 (H) 44.00 - 72.00 %    Lymphocytes 9.40 (L) 22.00 - 41.00 %    Monocytes 4.10 0.00 - 13.40 %    Eosinophils 0.90 0.00 - 6.90 %    Basophils 0.20 0.00 - 1.80 %    Immature Granulocytes 0.40 0.00 - 0.90 %    Nucleated RBC 0.00 /100 WBC    Neutrophils (Absolute) 4.51 2.00 - 7.15 K/uL    Lymphs (Absolute) 0.50 (L) 1.00 - 4.80 K/uL    Monos (Absolute) 0.22 0.00 - 0.85 K/uL    Eos (Absolute) 0.05 0.00 - 0.51 K/uL    Baso (Absolute) 0.01 0.00 - 0.12 K/uL    Immature Granulocytes (abs) 0.02 0.00 - 0.11 K/uL    NRBC (Absolute) 0.00 K/uL    Hypochromia 1+     Anisocytosis 1+     Microcytosis 1+    COMP METABOLIC PANEL   Result Value Ref Range    Sodium 130 (L) 135 - 145 mmol/L    Potassium 3.9 3.6 - 5.5 mmol/L    Chloride 90 (L) 96 - 112 mmol/L    Co2 29 20 - 33 mmol/L    Anion Gap 11.0 7.0 - 16.0    Glucose 90 65 - 99 mg/dL    Bun 35 (H) 8 - 22 mg/dL    Creatinine 6.50 (HH) 0.50 - 1.40 mg/dL    Calcium 8.5 8.4 - 10.2 mg/dL    AST(SGOT) 22 12 - 45 U/L    ALT(SGPT) 6 2 - 50 U/L    Alkaline Phosphatase 71 30 - 99 U/L    Total Bilirubin 0.4 0.1 - 1.5 mg/dL    Albumin 2.8 (L) 3.2 - 4.9 g/dL    Total Protein 7.6 6.0 - 8.2 g/dL     Globulin 4.8 (H) 1.9 - 3.5 g/dL    A-G Ratio 0.6 g/dL   proBrain Natriuretic Peptide, NT   Result Value Ref Range    NT-proBNP >87863 (H) 0 - 125 pg/mL   ESTIMATED GFR   Result Value Ref Range    GFR If African American 10 (A) >60 mL/min/1.73 m 2    GFR If Non  8 (A) >60 mL/min/1.73 m 2   PLATELET ESTIMATE   Result Value Ref Range    Plt Estimation Marked Decrease    MORPHOLOGY   Result Value Ref Range    RBC Morphology Present     Polychromia 1+     Poikilocytosis 1+     Ovalocytes 1+    DIFFERENTIAL COMMENT   Result Value Ref Range    Comments-Diff see below          RADIOLOGY/PROCEDURES  DX-CHEST-PORTABLE (1 VIEW)   Final Result      Hazy groundglass density in the lungs may represent atypical infection or edema.            COURSE & MEDICAL DECISION MAKING  Pertinent Labs & Imaging studies reviewed. (See chart for details)  Patient received a Hep-Lock IV and was given morphine, Zofran and Pepcid through the IV for her gastroesophageal reflux and nausea.  The morphine was for her low back pain.  Her laboratories show extreme volume overload with a BNP of greater than 35,000.  Her BUN and creatinine are 35 and 6.5, sodium is 130, potassium is within normal limits at 3.9, white count is normal and her H&H is 7.1 and 24.4 respectively.  Her platelet count is only 61,000 as well.  The patient is scheduled for dialysis this morning within 1 hour from now and states she is feeling better.  She will be discharged home in the care of her significant other to go directly to dialysis this morning.  She is to rest, continue her current medical regimen and should she experience any worsening she is to return for possible blood transfusion for packed RBCs as well as platelets.  She is discharged in stable and improved condition.  I have sent a prescription for Zofran oral dissolving tablets to her pharmacy as well.    FINAL IMPRESSION  1.  Chronic kidney disease on chronic dialysis  2.  Anemia of chronic  disease  3.  Low back pain  4.  GERD  5.  Bilateral lower extremity edema  6.  Shortness of breath         Electronically signed by: Alix Huntley D.O., 3/15/2021 5:25 [facility] Provider Note

## 2021-03-15 NOTE — DISCHARGE INSTRUCTIONS
Follow-up for dialysis this morning as scheduled as you are overloaded with fluid and need to be dialyzed.  Take the medications as directed

## 2021-03-15 NOTE — ED TRIAGE NOTES
"Chief Complaint   Patient presents with   • Abdominal Pain   • Back Pain     BP (!) 168/118   Temp 37 °C (98.6 °F) (Temporal)   Ht 1.702 m (5' 7\")   Wt (!) 133 kg (292 lb 8.8 oz)   BMI 45.82 kg/m²     "

## 2021-03-15 NOTE — ED NOTES
Lab called with critical result of creatnine at 6.5. Critical lab result read back.   Dr. Huntley notified of critical lab result.

## 2021-03-17 ENCOUNTER — HOSPITAL ENCOUNTER (EMERGENCY)
Facility: MEDICAL CENTER | Age: 26
End: 2021-03-18
Attending: EMERGENCY MEDICINE
Payer: COMMERCIAL

## 2021-03-17 DIAGNOSIS — Z99.2 STAGE 5 CHRONIC KIDNEY DISEASE ON CHRONIC DIALYSIS (HCC): ICD-10-CM

## 2021-03-17 DIAGNOSIS — N18.6 STAGE 5 CHRONIC KIDNEY DISEASE ON CHRONIC DIALYSIS (HCC): ICD-10-CM

## 2021-03-17 DIAGNOSIS — I10 CHRONIC HYPERTENSION: ICD-10-CM

## 2021-03-17 DIAGNOSIS — R06.09 CHRONIC DYSPNEA: ICD-10-CM

## 2021-03-17 LAB
ALBUMIN SERPL BCP-MCNC: 2.9 G/DL (ref 3.2–4.9)
ALBUMIN/GLOB SERPL: 0.6 G/DL
ALP SERPL-CCNC: 68 U/L (ref 30–99)
ALT SERPL-CCNC: 7 U/L (ref 2–50)
ANION GAP SERPL CALC-SCNC: 7 MMOL/L (ref 7–16)
AST SERPL-CCNC: 24 U/L (ref 12–45)
BILIRUB SERPL-MCNC: 0.4 MG/DL (ref 0.1–1.5)
BUN SERPL-MCNC: 17 MG/DL (ref 8–22)
CALCIUM SERPL-MCNC: 8.5 MG/DL (ref 8.4–10.2)
CHLORIDE SERPL-SCNC: 92 MMOL/L (ref 96–112)
CO2 SERPL-SCNC: 33 MMOL/L (ref 20–33)
CREAT SERPL-MCNC: 3.49 MG/DL (ref 0.5–1.4)
EKG IMPRESSION: NORMAL
ERYTHROCYTE [DISTWIDTH] IN BLOOD BY AUTOMATED COUNT: 66.4 FL (ref 35.9–50)
GLOBULIN SER CALC-MCNC: 5.2 G/DL (ref 1.9–3.5)
GLUCOSE SERPL-MCNC: 100 MG/DL (ref 65–99)
HCT VFR BLD AUTO: 24.5 % (ref 37–47)
HGB BLD-MCNC: 7 G/DL (ref 12–16)
MCH RBC QN AUTO: 26 PG (ref 27–33)
MCHC RBC AUTO-ENTMCNC: 28.6 G/DL (ref 33.6–35)
MCV RBC AUTO: 91.1 FL (ref 81.4–97.8)
PLATELET # BLD AUTO: 49 K/UL (ref 164–446)
POTASSIUM SERPL-SCNC: 3.8 MMOL/L (ref 3.6–5.5)
PROT SERPL-MCNC: 8.1 G/DL (ref 6–8.2)
RBC # BLD AUTO: 2.69 M/UL (ref 4.2–5.4)
SODIUM SERPL-SCNC: 132 MMOL/L (ref 135–145)
WBC # BLD AUTO: 3.2 K/UL (ref 4.8–10.8)

## 2021-03-17 PROCEDURE — 85027 COMPLETE CBC AUTOMATED: CPT

## 2021-03-17 PROCEDURE — 80053 COMPREHEN METABOLIC PANEL: CPT

## 2021-03-17 PROCEDURE — 99283 EMERGENCY DEPT VISIT LOW MDM: CPT

## 2021-03-17 PROCEDURE — 93005 ELECTROCARDIOGRAM TRACING: CPT

## 2021-03-17 PROCEDURE — 93005 ELECTROCARDIOGRAM TRACING: CPT | Performed by: EMERGENCY MEDICINE

## 2021-03-17 PROCEDURE — 83880 ASSAY OF NATRIURETIC PEPTIDE: CPT

## 2021-03-17 PROCEDURE — 36415 COLL VENOUS BLD VENIPUNCTURE: CPT

## 2021-03-17 ASSESSMENT — LIFESTYLE VARIABLES: DO YOU DRINK ALCOHOL: NO

## 2021-03-17 ASSESSMENT — FIBROSIS 4 INDEX: FIB4 SCORE: 3.39

## 2021-03-18 ENCOUNTER — APPOINTMENT (OUTPATIENT)
Dept: RADIOLOGY | Facility: MEDICAL CENTER | Age: 26
End: 2021-03-18
Attending: EMERGENCY MEDICINE
Payer: COMMERCIAL

## 2021-03-18 ENCOUNTER — OUTPATIENT INFUSION SERVICES (OUTPATIENT)
Dept: ONCOLOGY | Facility: MEDICAL CENTER | Age: 26
End: 2021-03-18
Attending: INTERNAL MEDICINE
Payer: COMMERCIAL

## 2021-03-18 VITALS
OXYGEN SATURATION: 95 % | BODY MASS INDEX: 20 KG/M2 | RESPIRATION RATE: 18 BRPM | TEMPERATURE: 98.5 F | SYSTOLIC BLOOD PRESSURE: 175 MMHG | HEART RATE: 79 BPM | HEIGHT: 67 IN | DIASTOLIC BLOOD PRESSURE: 113 MMHG | WEIGHT: 127.43 LBS

## 2021-03-18 VITALS
BODY MASS INDEX: 20.55 KG/M2 | SYSTOLIC BLOOD PRESSURE: 165 MMHG | DIASTOLIC BLOOD PRESSURE: 111 MMHG | HEART RATE: 101 BPM | TEMPERATURE: 97.3 F | HEIGHT: 66 IN | WEIGHT: 127.87 LBS | OXYGEN SATURATION: 100 % | RESPIRATION RATE: 18 BRPM

## 2021-03-18 DIAGNOSIS — D64.9 ACUTE ON CHRONIC ANEMIA: ICD-10-CM

## 2021-03-18 LAB
ABO GROUP BLD: NORMAL
BARCODED ABORH UBTYP: 5100
BARCODED ABORH UBTYP: 5100
BARCODED PRD CODE UBPRD: NORMAL
BARCODED PRD CODE UBPRD: NORMAL
BARCODED UNIT NUM UBUNT: NORMAL
BARCODED UNIT NUM UBUNT: NORMAL
BASOPHILS # BLD AUTO: 0.4 % (ref 0–1.8)
BASOPHILS # BLD: 0.01 K/UL (ref 0–0.12)
BLD GP AB SCN SERPL QL: NORMAL
COMPONENT R 8504R: NORMAL
COMPONENT R 8504R: NORMAL
EOSINOPHIL # BLD AUTO: 0.01 K/UL (ref 0–0.51)
EOSINOPHIL NFR BLD: 0.4 % (ref 0–6.9)
ERYTHROCYTE [DISTWIDTH] IN BLOOD BY AUTOMATED COUNT: 66.5 FL (ref 35.9–50)
HCT VFR BLD AUTO: 22.3 % (ref 37–47)
HGB BLD-MCNC: 6.4 G/DL (ref 12–16)
IMM GRANULOCYTES # BLD AUTO: 0.02 K/UL (ref 0–0.11)
IMM GRANULOCYTES NFR BLD AUTO: 0.8 % (ref 0–0.9)
LYMPHOCYTES # BLD AUTO: 0.35 K/UL (ref 1–4.8)
LYMPHOCYTES NFR BLD: 13.4 % (ref 22–41)
MCH RBC QN AUTO: 26.2 PG (ref 27–33)
MCHC RBC AUTO-ENTMCNC: 28.7 G/DL (ref 33.6–35)
MCV RBC AUTO: 91.4 FL (ref 81.4–97.8)
MONOCYTES # BLD AUTO: 0.17 K/UL (ref 0–0.85)
MONOCYTES NFR BLD AUTO: 6.5 % (ref 0–13.4)
NEUTROPHILS # BLD AUTO: 2.06 K/UL (ref 2–7.15)
NEUTROPHILS NFR BLD: 78.5 % (ref 44–72)
NRBC # BLD AUTO: 0 K/UL
NRBC BLD-RTO: 0 /100 WBC
NT-PROBNP SERPL IA-MCNC: ABNORMAL PG/ML (ref 0–125)
PLATELET # BLD AUTO: 48 K/UL (ref 164–446)
PRODUCT TYPE UPROD: NORMAL
PRODUCT TYPE UPROD: NORMAL
RBC # BLD AUTO: 2.44 M/UL (ref 4.2–5.4)
RH BLD: NORMAL
UNIT STATUS USTAT: NORMAL
UNIT STATUS USTAT: NORMAL
WBC # BLD AUTO: 2.6 K/UL (ref 4.8–10.8)

## 2021-03-18 PROCEDURE — 71045 X-RAY EXAM CHEST 1 VIEW: CPT

## 2021-03-18 PROCEDURE — 86922 COMPATIBILITY TEST ANTIGLOB: CPT | Mod: 91

## 2021-03-18 PROCEDURE — 86900 BLOOD TYPING SEROLOGIC ABO: CPT

## 2021-03-18 PROCEDURE — 36415 COLL VENOUS BLD VENIPUNCTURE: CPT

## 2021-03-18 PROCEDURE — 86901 BLOOD TYPING SEROLOGIC RH(D): CPT

## 2021-03-18 PROCEDURE — 86850 RBC ANTIBODY SCREEN: CPT

## 2021-03-18 PROCEDURE — 85025 COMPLETE CBC W/AUTO DIFF WBC: CPT

## 2021-03-18 RX ORDER — LIDOCAINE HYDROCHLORIDE 10 MG/ML
20 INJECTION, SOLUTION INFILTRATION; PERINEURAL
Status: CANCELLED | OUTPATIENT
Start: 2021-03-18

## 2021-03-18 RX ORDER — ACETAMINOPHEN 325 MG/1
650 TABLET ORAL PRN
Status: CANCELLED | OUTPATIENT
Start: 2021-03-18

## 2021-03-18 RX ORDER — SODIUM CHLORIDE 9 MG/ML
500 INJECTION, SOLUTION INTRAVENOUS ONCE
Status: CANCELLED | OUTPATIENT
Start: 2021-03-18 | End: 2021-03-18

## 2021-03-18 RX ORDER — SODIUM CHLORIDE 9 MG/ML
10 INJECTION, SOLUTION INTRAMUSCULAR; INTRAVENOUS; SUBCUTANEOUS PRN
Status: CANCELLED | OUTPATIENT
Start: 2021-03-18

## 2021-03-18 RX ORDER — ACETAMINOPHEN 325 MG/1
650 TABLET ORAL ONCE
Status: CANCELLED | OUTPATIENT
Start: 2021-03-18 | End: 2021-03-18

## 2021-03-18 RX ORDER — HEPARIN SODIUM (PORCINE) LOCK FLUSH IV SOLN 100 UNIT/ML 100 UNIT/ML
500 SOLUTION INTRAVENOUS PRN
Status: CANCELLED | OUTPATIENT
Start: 2021-03-18

## 2021-03-18 RX ORDER — DIPHENHYDRAMINE HYDROCHLORIDE 50 MG/ML
25 INJECTION INTRAMUSCULAR; INTRAVENOUS PRN
Status: CANCELLED | OUTPATIENT
Start: 2021-03-18

## 2021-03-18 RX ORDER — DIPHENHYDRAMINE HCL 25 MG
25 TABLET ORAL ONCE
Status: CANCELLED | OUTPATIENT
Start: 2021-03-18 | End: 2021-03-18

## 2021-03-18 ASSESSMENT — FIBROSIS 4 INDEX: FIB4 SCORE: 4.26

## 2021-03-18 NOTE — PROGRESS NOTES
Pt arrives to [facility] for labs for blood transfusion scheduled for 3/21/2021.  Pt reports SOB with exertion.  Oxygen provided 2L/min via NC.  Pt has oxygen supply at home.  Pt denies s/sx of infection.  Pt denies dizziness or fatigue today.  Pt had dialysis yesterday.  Labs drawn via 23g butterfly needle to [facility] by MICHELLE Guardado.   Pt dc home with her sister as transportation.  Confirmed next appt with pt for 3/21/21 at 900am.

## 2021-03-18 NOTE — ED NOTES
Written and verbal discharge instructions given to patient. Patient acknowledges and reports understanding of instructions.  Patient is agreeable to discharge at this time.  D/C to home with friend.

## 2021-03-18 NOTE — ED PROVIDER NOTES
"ED Provider Note    CHIEF COMPLAINT  Chief Complaint   Patient presents with   • Shortness of Breath     Reports SOB and dizziness which started today, reports \"last time this happened my HGB was low\". Reports last dialysis today, was able to finish dialysis. Reports home O2 3L NC use intermittently. Denies cough, sore throat, CP, fever.   • Dizziness     HPI  Patient is a 25-year-old female with a long established history of chronic kidney disease, currently managed by Dr. Garrett and on dialysis Monday Wednesday Friday with other chronic histories of GERD, low back pain and chronic shortness of breath.  She came in today as she felt like she was having some slight dizziness that she finds difficult to describe, she states that she has some slight shortness of breath and these vague dizziness sensations that have been associated with low hemoglobin levels in the past that has required admissions and transfusions.  She denies any acute traumatic injuries, she has not missed dialysis, she is not having chest pain, she has not any fevers chills or productive cough.  She denies any vision changes, no acute disequilibrium, she walked and without difficulty.    PPE Note: I personally donned full PPE for all patient encounters during this visit, including being clean-shaven with an N95 respirator mask, gloves, and goggles.     Chart review shows that the patient was seen on 3/15, 3 days ago for similar complaints.  At that time she had multiple lab abnormalities with a hemoglobin of 7 and changes consistent with her chronic kidney disease.    REVIEW OF SYSTEMS  See HPI for further details. All other systems are negative.     PAST MEDICAL HISTORY   has a past medical history of Anemia (01/17/2018), AVF (arteriovenous fistula) ([facility]), Dialysis patient ([facility]), ESRD (end stage renal disease) on dialysis ([facility]) (01/17/2018), Heart burn, Hypertension (01/17/2018), Indigestion, Lupus ([facility]), Migraines (01/17/2018), and Seizure ([facility]) " "(2013).    SOCIAL HISTORY  Social History     Tobacco Use   • Smoking status: Never Smoker   • Smokeless tobacco: Never Used   Substance and Sexual Activity   • Alcohol use: No   • Drug use: No   • Sexual activity: Not on file       SURGICAL HISTORY   has a past surgical history that includes ronak by laparoscopy (4/5/2010); av fistula creation (Right); angioplasty (01/17/2018); other; other; gastroscopy-endo (12/9/2019); gastroscopy (N/A, 5/30/2020); gastroscopy-endo (9/18/2020); upper gi endoscopy,ctrl bleed (11/12/2020); upper gi endoscopy,biopsy (11/12/2020); gastroscopy-endo (11/12/2020); colonoscopy,diagnostic (1/8/2021); upper gi endoscopy,diagnosis (1/8/2021); upper gi endoscopy,ctrl bleed (1/8/2021); and upper gi endoscopy,diagnosis (3/5/2021).    CURRENT MEDICATIONS  Home Medications    **Home medications have not yet been reviewed for this encounter**         ALLERGIES  Allergies   Allergen Reactions   • Cephalexin Rash     .   • Clindamycin Rash     .   • Methylprednisolone Unspecified     Anxious   • Metoprolol Rash     .       PHYSICAL EXAM  VITAL SIGNS: BP (!) 175/113   Pulse 79   Temp 36.9 °C (98.5 °F) (Temporal)   Resp 18   Ht 1.702 m (5' 7\")   Wt 57.8 kg (127 lb 6.8 oz)   SpO2 95%   BMI 19.96 kg/m²   Pulse ox interpretation: I interpret this pulse ox as normal.  Genl: chronically ill appearing F sitting in gurney comfortably, speaking clearly, appears in no acute distress   Head: NC/AT   ENT: Mucous membranes moist, posterior pharynx clear, uvula midline, nares patent bilaterally   Eyes: Normal sclera, pupils equal round reactive to light  Neck: Supple, FROM, no LAD appreciated   Pulmonary: Lungs are clear to auscultation bilaterally  Chest: No TTP  Back: nonspecific tenderness in right lower lumbar region.  CV:  tachycardia, no murmur appreciated, pulses 2+ in both upper and lower extremities  Abdomen: soft, NT/ND; no rebound/guarding, no masses palpated, no HSM  : no CVA or suprapubic " tenderness  Musculoskeletal: Pain free ROM of the neck. Moving upper and lower extremities and spontaneous in coordinated fashion  Neuro: A&Ox4 (person, place, time, situation), speech fluent, gait steady, no focal deficits appreciated, No cerebellar signs. Sensation is grossly intact in the distal upper and lower extremities.  5/5 strength in  and dorsiflexion/plantar flexion of the ankles  Psych: Patient has an appropriate affect and behavior  Skin: No rash or lesions.  No pallor or jaundice.  No cyanosis.  Warm and dry.     DIAGNOSTIC STUDIES / PROCEDURES    EKG  As interpreted by me at 2144: Is a sinus tachycardia rate 110, there is no pronounced T waves, there is no acute ischemia or infarction, ID and QTC is not prolonged, this is narrow complex.  Unchanged from prior EKG.    LABS  Labs Reviewed   CBC WITHOUT DIFFERENTIAL - Abnormal; Notable for the following components:       Result Value    WBC 3.2 (*)     RBC 2.69 (*)     Hemoglobin 7.0 (*)     Hematocrit 24.5 (*)     MCH 26.0 (*)     MCHC 28.6 (*)     RDW 66.4 (*)     Platelet Count 49 (*)     All other components within normal limits    Narrative:     Release to patient->Immediate   COMP METABOLIC PANEL - Abnormal; Notable for the following components:    Sodium 132 (*)     Chloride 92 (*)     Glucose 100 (*)     Creatinine 3.49 (*)     Albumin 2.9 (*)     Globulin 5.2 (*)     All other components within normal limits    Narrative:     Release to patient->Immediate   ESTIMATED GFR - Abnormal; Notable for the following components:    GFR If  20 (*)     GFR If Non  16 (*)     All other components within normal limits    Narrative:     Release to patient->Immediate   PROBRAIN NATRIURETIC PEPTIDE, NT - Abnormal; Notable for the following components:    NT-proBNP >91237 (*)     All other components within normal limits    Narrative:     Release to patient->Immediate     RADIOLOGY  DX-CHEST-PORTABLE (1 VIEW)   Final Result          1.  Pulmonary edema and/or infiltrates.   2.  Cardiomegaly        COURSE & MEDICAL DECISION MAKING  Pertinent Labs & Imaging studies reviewed. (See chart for details)    MDM    Initial evaluation at 1202:  Seen and evaluated for symptoms as described above.  The patient is very familiar to the emergency department and has had chronic shortness of breath, dyspnea and dizziness.  During the patient being seen in the emergency department for these events in her nephrologist office, being seen 3 days ago for same complaints and currently she is not hypoxic, she does not report any interval changes, she has not missed dialysis.  Acutely lab work was obtained to assess for any possible metabolic abnormalities or EKG changes.  She is chronically hypertensive, she is initially tachycardic though upon reassessment this is improving.  Patient's CBC shows a chronic anemia that this is stable at 7 and she does not have any GI complaints, gross hematuria.  Platelets are also slightly decreased at 49, again with no active bleeding or cutaneous bruising.  BNP is elevated though consistent with her previous creatinine/BUN and GFR levels.  She is planning to get dialysis tomorrow.  She is ambulated by myself in the room, she has no exertional dyspnea, her dizziness is resolved.  Heart rate is decreased from 115-79 and she remains without acute hypoxia or febrile illness.  We discussed the finding on her chest x-ray, she does have elements of pulmonary edema but without clinical findings to support disease progression or substantial hypoxia she will address this with fluid removal in dialysis setting.    Discussed the case with Dr. Najjar, covering nephrologist for the patient who with the current state and stable labs visit the patient can be managed in outpatient setting.    FINAL IMPRESSION  Visit Diagnoses     ICD-10-CM   1. Stage 5 chronic kidney disease on chronic dialysis (HCC)  N18.6    Z99.2   2. Chronic hypertension   I10   3. Chronic dyspnea  R06.09     Electronically signed by: Manolo Whitfield M.D., 3/18/2021 12:02 AM

## 2021-03-18 NOTE — PROGRESS NOTES
Hemoglobin is 6.4 and platelets are 48,000 today.  COD sent to blood bank.  Blood bank communication ordered for 2 units PRBC and notified BB pt has history of antibodies.  Pt notified of plan of care and is aware to go to ED if symptomatic.

## 2021-03-18 NOTE — ED NOTES
RN at bedside. O2 Sat initially read in the 70's, but with poor pleth waveform. Patient has no SoB at this time.   Patient was monitored at bedside by RN and patient was placed on room air. With good pleth waveform, O2 sat is currently maintaining in the mid 90's.

## 2021-03-19 ENCOUNTER — HOSPITAL ENCOUNTER (INPATIENT)
Facility: MEDICAL CENTER | Age: 26
LOS: 1 days | DRG: 377 | End: 2021-03-20
Attending: EMERGENCY MEDICINE | Admitting: INTERNAL MEDICINE
Payer: COMMERCIAL

## 2021-03-19 ENCOUNTER — ANESTHESIA (OUTPATIENT)
Dept: SURGERY | Facility: MEDICAL CENTER | Age: 26
DRG: 377 | End: 2021-03-19
Payer: COMMERCIAL

## 2021-03-19 ENCOUNTER — ANESTHESIA EVENT (OUTPATIENT)
Dept: SURGERY | Facility: MEDICAL CENTER | Age: 26
DRG: 377 | End: 2021-03-19
Payer: COMMERCIAL

## 2021-03-19 DIAGNOSIS — R42 DIZZINESS: ICD-10-CM

## 2021-03-19 DIAGNOSIS — D64.9 SYMPTOMATIC ANEMIA: ICD-10-CM

## 2021-03-19 DIAGNOSIS — R06.00 DYSPNEA, UNSPECIFIED TYPE: ICD-10-CM

## 2021-03-19 PROBLEM — D62 ANEMIA DUE TO BLOOD LOSS, ACUTE: Status: ACTIVE | Noted: 2021-03-19

## 2021-03-19 PROBLEM — J96.01 ACUTE HYPOXEMIC RESPIRATORY FAILURE (HCC): Status: ACTIVE | Noted: 2021-03-19

## 2021-03-19 PROBLEM — R06.02 SHORTNESS OF BREATH: Status: ACTIVE | Noted: 2021-03-19

## 2021-03-19 LAB
ABO GROUP BLD: NORMAL
ANION GAP SERPL CALC-SCNC: 10 MMOL/L (ref 7–16)
ANISOCYTOSIS BLD QL SMEAR: ABNORMAL
BASOPHILS # BLD AUTO: 0.3 % (ref 0–1.8)
BASOPHILS # BLD: 0.01 K/UL (ref 0–0.12)
BLD GP AB SCN SERPL QL: NORMAL
BUN SERPL-MCNC: 32 MG/DL (ref 8–22)
CALCIUM SERPL-MCNC: 8.6 MG/DL (ref 8.4–10.2)
CHLORIDE SERPL-SCNC: 93 MMOL/L (ref 96–112)
CO2 SERPL-SCNC: 28 MMOL/L (ref 20–33)
COMMENT 1642: NORMAL
CREAT SERPL-MCNC: 5.47 MG/DL (ref 0.5–1.4)
EOSINOPHIL # BLD AUTO: 0.03 K/UL (ref 0–0.51)
EOSINOPHIL NFR BLD: 0.9 % (ref 0–6.9)
ERYTHROCYTE [DISTWIDTH] IN BLOOD BY AUTOMATED COUNT: 66 FL (ref 35.9–50)
GLUCOSE SERPL-MCNC: 91 MG/DL (ref 65–99)
HCG SERPL QL: NEGATIVE
HCT VFR BLD AUTO: 20.2 % (ref 37–47)
HCT VFR BLD AUTO: 27 % (ref 37–47)
HCT VFR BLD AUTO: 32.1 % (ref 37–47)
HGB BLD-MCNC: 5.8 G/DL (ref 12–16)
HGB BLD-MCNC: 7.9 G/DL (ref 12–16)
HGB BLD-MCNC: 9.2 G/DL (ref 12–16)
HYPOCHROMIA BLD QL SMEAR: ABNORMAL
IMM GRANULOCYTES # BLD AUTO: 0.02 K/UL (ref 0–0.11)
IMM GRANULOCYTES NFR BLD AUTO: 0.6 % (ref 0–0.9)
IRON SATN MFR SERPL: 23 % (ref 15–55)
IRON SERPL-MCNC: 30 UG/DL (ref 40–170)
LYMPHOCYTES # BLD AUTO: 0.56 K/UL (ref 1–4.8)
LYMPHOCYTES NFR BLD: 16.4 % (ref 22–41)
MCH RBC QN AUTO: 26.1 PG (ref 27–33)
MCHC RBC AUTO-ENTMCNC: 28.7 G/DL (ref 33.6–35)
MCV RBC AUTO: 91 FL (ref 81.4–97.8)
MICROCYTES BLD QL SMEAR: ABNORMAL
MONOCYTES # BLD AUTO: 0.23 K/UL (ref 0–0.85)
MONOCYTES NFR BLD AUTO: 6.7 % (ref 0–13.4)
NEUTROPHILS # BLD AUTO: 2.57 K/UL (ref 2–7.15)
NEUTROPHILS NFR BLD: 75.1 % (ref 44–72)
NRBC # BLD AUTO: 0 K/UL
NRBC BLD-RTO: 0 /100 WBC
OVALOCYTES BLD QL SMEAR: NORMAL
PLATELET # BLD AUTO: 59 K/UL (ref 164–446)
PLATELET BLD QL SMEAR: NORMAL
PMV BLD AUTO: 13.7 FL (ref 9–12.9)
POIKILOCYTOSIS BLD QL SMEAR: NORMAL
POLYCHROMASIA BLD QL SMEAR: NORMAL
POTASSIUM SERPL-SCNC: 3.7 MMOL/L (ref 3.6–5.5)
POTASSIUM SERPL-SCNC: 4 MMOL/L (ref 3.6–5.5)
RBC # BLD AUTO: 2.22 M/UL (ref 4.2–5.4)
RBC BLD AUTO: PRESENT
RH BLD: NORMAL
SARS-COV-2 RNA RESP QL NAA+PROBE: NOTDETECTED
SODIUM SERPL-SCNC: 131 MMOL/L (ref 135–145)
SPECIMEN SOURCE: NORMAL
TIBC SERPL-MCNC: 131 UG/DL (ref 250–450)
UIBC SERPL-MCNC: 101 UG/DL (ref 110–370)
WBC # BLD AUTO: 3.4 K/UL (ref 4.8–10.8)

## 2021-03-19 PROCEDURE — 700111 HCHG RX REV CODE 636 W/ 250 OVERRIDE (IP): Performed by: EMERGENCY MEDICINE

## 2021-03-19 PROCEDURE — 30233N1 TRANSFUSION OF NONAUTOLOGOUS RED BLOOD CELLS INTO PERIPHERAL VEIN, PERCUTANEOUS APPROACH: ICD-10-PCS | Performed by: INTERNAL MEDICINE

## 2021-03-19 PROCEDURE — 501159 HCHG PROBE, LAP: Performed by: INTERNAL MEDICINE

## 2021-03-19 PROCEDURE — P9016 RBC LEUKOCYTES REDUCED: HCPCS

## 2021-03-19 PROCEDURE — C9113 INJ PANTOPRAZOLE SODIUM, VIA: HCPCS | Performed by: INTERNAL MEDICINE

## 2021-03-19 PROCEDURE — 160036 HCHG PACU - EA ADDL 30 MINS PHASE I: Performed by: INTERNAL MEDICINE

## 2021-03-19 PROCEDURE — 700111 HCHG RX REV CODE 636 W/ 250 OVERRIDE (IP): Performed by: INTERNAL MEDICINE

## 2021-03-19 PROCEDURE — 86850 RBC ANTIBODY SCREEN: CPT

## 2021-03-19 PROCEDURE — 99223 1ST HOSP IP/OBS HIGH 75: CPT | Performed by: INTERNAL MEDICINE

## 2021-03-19 PROCEDURE — 700102 HCHG RX REV CODE 250 W/ 637 OVERRIDE(OP): Performed by: INTERNAL MEDICINE

## 2021-03-19 PROCEDURE — 80048 BASIC METABOLIC PNL TOTAL CA: CPT

## 2021-03-19 PROCEDURE — 500066 HCHG BITE BLOCK, ECT: Performed by: INTERNAL MEDICINE

## 2021-03-19 PROCEDURE — 99255 IP/OBS CONSLTJ NEW/EST HI 80: CPT | Performed by: INTERNAL MEDICINE

## 2021-03-19 PROCEDURE — 84703 CHORIONIC GONADOTROPIN ASSAY: CPT

## 2021-03-19 PROCEDURE — A9270 NON-COVERED ITEM OR SERVICE: HCPCS | Performed by: INTERNAL MEDICINE

## 2021-03-19 PROCEDURE — 86945 BLOOD PRODUCT/IRRADIATION: CPT

## 2021-03-19 PROCEDURE — 700105 HCHG RX REV CODE 258: Performed by: ANESTHESIOLOGY

## 2021-03-19 PROCEDURE — 36415 COLL VENOUS BLD VENIPUNCTURE: CPT

## 2021-03-19 PROCEDURE — 96374 THER/PROPH/DIAG INJ IV PUSH: CPT

## 2021-03-19 PROCEDURE — 99285 EMERGENCY DEPT VISIT HI MDM: CPT

## 2021-03-19 PROCEDURE — 0DJ08ZZ INSPECTION OF UPPER INTESTINAL TRACT, VIA NATURAL OR ARTIFICIAL OPENING ENDOSCOPIC: ICD-10-PCS | Performed by: INTERNAL MEDICINE

## 2021-03-19 PROCEDURE — 160048 HCHG OR STATISTICAL LEVEL 1-5: Performed by: INTERNAL MEDICINE

## 2021-03-19 PROCEDURE — 160203 HCHG ENDO MINUTES - 1ST 30 MINS LEVEL 4: Performed by: INTERNAL MEDICINE

## 2021-03-19 PROCEDURE — 0W3P8ZZ CONTROL BLEEDING IN GASTROINTESTINAL TRACT, VIA NATURAL OR ARTIFICIAL OPENING ENDOSCOPIC: ICD-10-PCS | Performed by: INTERNAL MEDICINE

## 2021-03-19 PROCEDURE — 96375 TX/PRO/DX INJ NEW DRUG ADDON: CPT

## 2021-03-19 PROCEDURE — 160002 HCHG RECOVERY MINUTES (STAT): Performed by: INTERNAL MEDICINE

## 2021-03-19 PROCEDURE — 700111 HCHG RX REV CODE 636 W/ 250 OVERRIDE (IP): Performed by: ANESTHESIOLOGY

## 2021-03-19 PROCEDURE — 700105 HCHG RX REV CODE 258: Performed by: INTERNAL MEDICINE

## 2021-03-19 PROCEDURE — 86922 COMPATIBILITY TEST ANTIGLOB: CPT | Mod: 91

## 2021-03-19 PROCEDURE — 85014 HEMATOCRIT: CPT

## 2021-03-19 PROCEDURE — 160208 HCHG ENDO MINUTES - EA ADDL 1 MIN LEVEL 4: Performed by: INTERNAL MEDICINE

## 2021-03-19 PROCEDURE — U0005 INFEC AGEN DETEC AMPLI PROBE: HCPCS

## 2021-03-19 PROCEDURE — 5A1D70Z PERFORMANCE OF URINARY FILTRATION, INTERMITTENT, LESS THAN 6 HOURS PER DAY: ICD-10-PCS | Performed by: INTERNAL MEDICINE

## 2021-03-19 PROCEDURE — 90935 HEMODIALYSIS ONE EVALUATION: CPT

## 2021-03-19 PROCEDURE — 83550 IRON BINDING TEST: CPT

## 2021-03-19 PROCEDURE — U0003 INFECTIOUS AGENT DETECTION BY NUCLEIC ACID (DNA OR RNA); SEVERE ACUTE RESPIRATORY SYNDROME CORONAVIRUS 2 (SARS-COV-2) (CORONAVIRUS DISEASE [COVID-19]), AMPLIFIED PROBE TECHNIQUE, MAKING USE OF HIGH THROUGHPUT TECHNOLOGIES AS DESCRIBED BY CMS-2020-01-R: HCPCS

## 2021-03-19 PROCEDURE — 85025 COMPLETE CBC W/AUTO DIFF WBC: CPT

## 2021-03-19 PROCEDURE — 160009 HCHG ANES TIME/MIN: Performed by: INTERNAL MEDICINE

## 2021-03-19 PROCEDURE — 36430 TRANSFUSION BLD/BLD COMPNT: CPT

## 2021-03-19 PROCEDURE — 86902 BLOOD TYPE ANTIGEN DONOR EA: CPT | Mod: 91

## 2021-03-19 PROCEDURE — 160035 HCHG PACU - 1ST 60 MINS PHASE I: Performed by: INTERNAL MEDICINE

## 2021-03-19 PROCEDURE — 700101 HCHG RX REV CODE 250: Performed by: ANESTHESIOLOGY

## 2021-03-19 PROCEDURE — 83540 ASSAY OF IRON: CPT

## 2021-03-19 PROCEDURE — 86901 BLOOD TYPING SEROLOGIC RH(D): CPT

## 2021-03-19 PROCEDURE — 84132 ASSAY OF SERUM POTASSIUM: CPT

## 2021-03-19 PROCEDURE — 85018 HEMOGLOBIN: CPT

## 2021-03-19 PROCEDURE — 99253 IP/OBS CNSLTJ NEW/EST LOW 45: CPT | Performed by: INTERNAL MEDICINE

## 2021-03-19 PROCEDURE — 770020 HCHG ROOM/CARE - TELE (206)

## 2021-03-19 RX ORDER — ONDANSETRON 4 MG/1
4 TABLET, ORALLY DISINTEGRATING ORAL EVERY 4 HOURS PRN
Status: DISCONTINUED | OUTPATIENT
Start: 2021-03-19 | End: 2021-03-20 | Stop reason: HOSPADM

## 2021-03-19 RX ORDER — TRAMADOL HYDROCHLORIDE 50 MG/1
50 TABLET ORAL EVERY 6 HOURS PRN
Status: DISCONTINUED | OUTPATIENT
Start: 2021-03-19 | End: 2021-03-19

## 2021-03-19 RX ORDER — MORPHINE SULFATE 4 MG/ML
4 INJECTION, SOLUTION INTRAMUSCULAR; INTRAVENOUS ONCE
Status: COMPLETED | OUTPATIENT
Start: 2021-03-19 | End: 2021-03-19

## 2021-03-19 RX ORDER — PROCHLORPERAZINE EDISYLATE 5 MG/ML
5-10 INJECTION INTRAMUSCULAR; INTRAVENOUS EVERY 4 HOURS PRN
Status: DISCONTINUED | OUTPATIENT
Start: 2021-03-19 | End: 2021-03-20 | Stop reason: HOSPADM

## 2021-03-19 RX ORDER — SODIUM CHLORIDE 9 MG/ML
INJECTION, SOLUTION INTRAVENOUS CONTINUOUS
Status: DISCONTINUED | OUTPATIENT
Start: 2021-03-19 | End: 2021-03-19

## 2021-03-19 RX ORDER — AMLODIPINE BESYLATE 5 MG/1
10 TABLET ORAL EVERY EVENING
Status: DISCONTINUED | OUTPATIENT
Start: 2021-03-19 | End: 2021-03-20 | Stop reason: HOSPADM

## 2021-03-19 RX ORDER — SODIUM CHLORIDE 9 MG/ML
INJECTION, SOLUTION INTRAVENOUS CONTINUOUS
Status: ACTIVE | OUTPATIENT
Start: 2021-03-19 | End: 2021-03-19

## 2021-03-19 RX ORDER — AMOXICILLIN 250 MG
2 CAPSULE ORAL 2 TIMES DAILY
Status: DISCONTINUED | OUTPATIENT
Start: 2021-03-19 | End: 2021-03-20 | Stop reason: HOSPADM

## 2021-03-19 RX ORDER — ACETAMINOPHEN 325 MG/1
650 TABLET ORAL EVERY 6 HOURS PRN
Status: DISCONTINUED | OUTPATIENT
Start: 2021-03-19 | End: 2021-03-20 | Stop reason: HOSPADM

## 2021-03-19 RX ORDER — PANTOPRAZOLE SODIUM 40 MG/10ML
INJECTION, POWDER, LYOPHILIZED, FOR SOLUTION INTRAVENOUS
Status: ACTIVE
Start: 2021-03-19 | End: 2021-03-19

## 2021-03-19 RX ORDER — HYDROXYCHLOROQUINE SULFATE 200 MG/1
200 TABLET, FILM COATED ORAL EVERY EVENING
Status: DISCONTINUED | OUTPATIENT
Start: 2021-03-19 | End: 2021-03-20 | Stop reason: HOSPADM

## 2021-03-19 RX ORDER — MIDAZOLAM HYDROCHLORIDE 1 MG/ML
INJECTION INTRAMUSCULAR; INTRAVENOUS PRN
Status: DISCONTINUED | OUTPATIENT
Start: 2021-03-19 | End: 2021-03-19 | Stop reason: SURG

## 2021-03-19 RX ORDER — DIPHENHYDRAMINE HYDROCHLORIDE 50 MG/ML
12.5 INJECTION INTRAMUSCULAR; INTRAVENOUS
Status: DISCONTINUED | OUTPATIENT
Start: 2021-03-19 | End: 2021-03-19 | Stop reason: HOSPADM

## 2021-03-19 RX ORDER — METOCLOPRAMIDE 10 MG/1
10 TABLET ORAL
Status: DISCONTINUED | OUTPATIENT
Start: 2021-03-19 | End: 2021-03-20

## 2021-03-19 RX ORDER — PREDNISONE 5 MG/1
5 TABLET ORAL EVERY EVENING
Status: DISCONTINUED | OUTPATIENT
Start: 2021-03-19 | End: 2021-03-20 | Stop reason: HOSPADM

## 2021-03-19 RX ORDER — SUCRALFATE ORAL 1 G/10ML
1 SUSPENSION ORAL 2 TIMES DAILY
Status: DISCONTINUED | OUTPATIENT
Start: 2021-03-19 | End: 2021-03-19

## 2021-03-19 RX ORDER — PROMETHAZINE HYDROCHLORIDE 25 MG/1
12.5-25 SUPPOSITORY RECTAL EVERY 4 HOURS PRN
Status: DISCONTINUED | OUTPATIENT
Start: 2021-03-19 | End: 2021-03-20 | Stop reason: HOSPADM

## 2021-03-19 RX ORDER — OMEPRAZOLE 40 MG/1
40 CAPSULE, DELAYED RELEASE ORAL DAILY
Status: DISCONTINUED | OUTPATIENT
Start: 2021-03-19 | End: 2021-03-19

## 2021-03-19 RX ORDER — FUROSEMIDE 40 MG/1
40 TABLET ORAL
Status: DISCONTINUED | OUTPATIENT
Start: 2021-03-19 | End: 2021-03-20 | Stop reason: HOSPADM

## 2021-03-19 RX ORDER — MYCOPHENOLIC ACID 180 MG/1
180 TABLET, DELAYED RELEASE ORAL EVERY EVENING
Status: DISCONTINUED | OUTPATIENT
Start: 2021-03-19 | End: 2021-03-19

## 2021-03-19 RX ORDER — HYDRALAZINE HYDROCHLORIDE 20 MG/ML
INJECTION INTRAMUSCULAR; INTRAVENOUS PRN
Status: DISCONTINUED | OUTPATIENT
Start: 2021-03-19 | End: 2021-03-19 | Stop reason: SURG

## 2021-03-19 RX ORDER — SODIUM CHLORIDE 9 MG/ML
INJECTION, SOLUTION INTRAVENOUS
Status: DISCONTINUED | OUTPATIENT
Start: 2021-03-19 | End: 2021-03-19 | Stop reason: SURG

## 2021-03-19 RX ORDER — HYDRALAZINE HYDROCHLORIDE 20 MG/ML
5 INJECTION INTRAMUSCULAR; INTRAVENOUS
Status: DISCONTINUED | OUTPATIENT
Start: 2021-03-19 | End: 2021-03-19 | Stop reason: HOSPADM

## 2021-03-19 RX ORDER — LABETALOL HYDROCHLORIDE 5 MG/ML
10 INJECTION, SOLUTION INTRAVENOUS EVERY 4 HOURS PRN
Status: DISCONTINUED | OUTPATIENT
Start: 2021-03-19 | End: 2021-03-20 | Stop reason: HOSPADM

## 2021-03-19 RX ORDER — ONDANSETRON 2 MG/ML
4 INJECTION INTRAMUSCULAR; INTRAVENOUS ONCE
Status: COMPLETED | OUTPATIENT
Start: 2021-03-19 | End: 2021-03-19

## 2021-03-19 RX ORDER — DIPHENHYDRAMINE HYDROCHLORIDE 50 MG/ML
25 INJECTION INTRAMUSCULAR; INTRAVENOUS EVERY 6 HOURS PRN
Status: DISCONTINUED | OUTPATIENT
Start: 2021-03-19 | End: 2021-03-20 | Stop reason: HOSPADM

## 2021-03-19 RX ORDER — ONDANSETRON 2 MG/ML
4 INJECTION INTRAMUSCULAR; INTRAVENOUS EVERY 4 HOURS PRN
Status: DISCONTINUED | OUTPATIENT
Start: 2021-03-19 | End: 2021-03-20 | Stop reason: HOSPADM

## 2021-03-19 RX ORDER — SODIUM CHLORIDE 9 MG/ML
INJECTION, SOLUTION INTRAVENOUS CONTINUOUS
Status: DISCONTINUED | OUTPATIENT
Start: 2021-03-19 | End: 2021-03-19 | Stop reason: HOSPADM

## 2021-03-19 RX ORDER — POLYETHYLENE GLYCOL 3350 17 G/17G
1 POWDER, FOR SOLUTION ORAL
Status: DISCONTINUED | OUTPATIENT
Start: 2021-03-19 | End: 2021-03-20 | Stop reason: HOSPADM

## 2021-03-19 RX ORDER — CARVEDILOL 25 MG/1
25 TABLET ORAL 2 TIMES DAILY WITH MEALS
Status: DISCONTINUED | OUTPATIENT
Start: 2021-03-19 | End: 2021-03-20 | Stop reason: HOSPADM

## 2021-03-19 RX ORDER — TRAMADOL HYDROCHLORIDE 50 MG/1
50 TABLET ORAL EVERY 12 HOURS PRN
Status: DISCONTINUED | OUTPATIENT
Start: 2021-03-19 | End: 2021-03-20 | Stop reason: HOSPADM

## 2021-03-19 RX ORDER — ESMOLOL HYDROCHLORIDE 10 MG/ML
INJECTION INTRAVENOUS PRN
Status: DISCONTINUED | OUTPATIENT
Start: 2021-03-19 | End: 2021-03-19 | Stop reason: SURG

## 2021-03-19 RX ORDER — MORPHINE SULFATE 4 MG/ML
2 INJECTION, SOLUTION INTRAMUSCULAR; INTRAVENOUS ONCE
Status: COMPLETED | OUTPATIENT
Start: 2021-03-19 | End: 2021-03-19

## 2021-03-19 RX ORDER — LIDOCAINE HYDROCHLORIDE 20 MG/ML
INJECTION, SOLUTION EPIDURAL; INFILTRATION; INTRACAUDAL; PERINEURAL PRN
Status: DISCONTINUED | OUTPATIENT
Start: 2021-03-19 | End: 2021-03-19 | Stop reason: SURG

## 2021-03-19 RX ORDER — SUCRALFATE ORAL 1 G/10ML
1 SUSPENSION ORAL
Status: DISCONTINUED | OUTPATIENT
Start: 2021-03-19 | End: 2021-03-20 | Stop reason: HOSPADM

## 2021-03-19 RX ORDER — BISACODYL 10 MG
10 SUPPOSITORY, RECTAL RECTAL
Status: DISCONTINUED | OUTPATIENT
Start: 2021-03-19 | End: 2021-03-20 | Stop reason: HOSPADM

## 2021-03-19 RX ORDER — ONDANSETRON 2 MG/ML
4 INJECTION INTRAMUSCULAR; INTRAVENOUS
Status: COMPLETED | OUTPATIENT
Start: 2021-03-19 | End: 2021-03-19

## 2021-03-19 RX ORDER — PANTOPRAZOLE SODIUM 40 MG/10ML
40 INJECTION, POWDER, LYOPHILIZED, FOR SOLUTION INTRAVENOUS 2 TIMES DAILY
Status: DISCONTINUED | OUTPATIENT
Start: 2021-03-19 | End: 2021-03-20 | Stop reason: HOSPADM

## 2021-03-19 RX ORDER — PROMETHAZINE HYDROCHLORIDE 25 MG/1
12.5-25 TABLET ORAL EVERY 4 HOURS PRN
Status: DISCONTINUED | OUTPATIENT
Start: 2021-03-19 | End: 2021-03-20 | Stop reason: HOSPADM

## 2021-03-19 RX ADMIN — DIPHENHYDRAMINE HYDROCHLORIDE 25 MG: 50 INJECTION, SOLUTION INTRAMUSCULAR; INTRAVENOUS at 10:21

## 2021-03-19 RX ADMIN — ONDANSETRON 4 MG: 2 INJECTION INTRAMUSCULAR; INTRAVENOUS at 02:13

## 2021-03-19 RX ADMIN — HYDRALAZINE HYDROCHLORIDE 5 MG: 20 INJECTION INTRAMUSCULAR; INTRAVENOUS at 14:22

## 2021-03-19 RX ADMIN — ONDANSETRON 4 MG: 2 INJECTION INTRAMUSCULAR; INTRAVENOUS at 19:43

## 2021-03-19 RX ADMIN — FUROSEMIDE 40 MG: 40 TABLET ORAL at 03:24

## 2021-03-19 RX ADMIN — ONDANSETRON 4 MG: 2 INJECTION INTRAMUSCULAR; INTRAVENOUS at 15:18

## 2021-03-19 RX ADMIN — SODIUM CHLORIDE: 9 INJECTION, SOLUTION INTRAVENOUS at 10:14

## 2021-03-19 RX ADMIN — MIDAZOLAM HYDROCHLORIDE 2 MG: 1 INJECTION, SOLUTION INTRAMUSCULAR; INTRAVENOUS at 14:15

## 2021-03-19 RX ADMIN — PREDNISONE 5 MG: 5 TABLET ORAL at 18:11

## 2021-03-19 RX ADMIN — MORPHINE SULFATE 4 MG: 4 INJECTION INTRAVENOUS at 02:14

## 2021-03-19 RX ADMIN — SODIUM CHLORIDE 80 MG: 9 INJECTION, SOLUTION INTRAVENOUS at 03:00

## 2021-03-19 RX ADMIN — FUROSEMIDE 40 MG: 40 TABLET ORAL at 18:10

## 2021-03-19 RX ADMIN — PROPOFOL 180 MCG/KG/MIN: 10 INJECTION, EMULSION INTRAVENOUS at 14:18

## 2021-03-19 RX ADMIN — HYDRALAZINE HYDROCHLORIDE 5 MG: 20 INJECTION INTRAMUSCULAR; INTRAVENOUS at 15:14

## 2021-03-19 RX ADMIN — LIDOCAINE HYDROCHLORIDE 10 MG: 20 INJECTION, SOLUTION EPIDURAL; INFILTRATION; INTRACAUDAL; PERINEURAL at 14:18

## 2021-03-19 RX ADMIN — FENTANYL CITRATE 50 MCG: 50 INJECTION, SOLUTION INTRAMUSCULAR; INTRAVENOUS at 15:40

## 2021-03-19 RX ADMIN — DIPHENHYDRAMINE HYDROCHLORIDE 25 MG: 50 INJECTION, SOLUTION INTRAMUSCULAR; INTRAVENOUS at 04:56

## 2021-03-19 RX ADMIN — HYDROXYCHLOROQUINE SULFATE 200 MG: 200 TABLET, FILM COATED ORAL at 18:10

## 2021-03-19 RX ADMIN — CARVEDILOL 25 MG: 25 TABLET, FILM COATED ORAL at 18:11

## 2021-03-19 RX ADMIN — ACETAMINOPHEN 650 MG: 325 TABLET, FILM COATED ORAL at 10:21

## 2021-03-19 RX ADMIN — PROPOFOL 20 MG: 10 INJECTION, EMULSION INTRAVENOUS at 14:18

## 2021-03-19 RX ADMIN — AMLODIPINE BESYLATE 10 MG: 5 TABLET ORAL at 18:37

## 2021-03-19 RX ADMIN — PANTOPRAZOLE SODIUM 40 MG: 40 INJECTION, POWDER, LYOPHILIZED, FOR SOLUTION INTRAVENOUS at 18:12

## 2021-03-19 RX ADMIN — ACETAMINOPHEN 650 MG: 325 TABLET, FILM COATED ORAL at 04:19

## 2021-03-19 RX ADMIN — PROCHLORPERAZINE EDISYLATE 10 MG: 5 INJECTION INTRAMUSCULAR; INTRAVENOUS at 07:50

## 2021-03-19 RX ADMIN — METOCLOPRAMIDE 10 MG: 10 TABLET ORAL at 10:20

## 2021-03-19 RX ADMIN — METOCLOPRAMIDE 10 MG: 10 TABLET ORAL at 18:16

## 2021-03-19 RX ADMIN — SODIUM CHLORIDE: 9 INJECTION, SOLUTION INTRAVENOUS at 14:18

## 2021-03-19 RX ADMIN — ESMOLOL HYDROCHLORIDE 30 MG: 10 INJECTION, SOLUTION INTRAVENOUS at 14:22

## 2021-03-19 RX ADMIN — PANTOPRAZOLE SODIUM 40 MG: 40 INJECTION, POWDER, LYOPHILIZED, FOR SOLUTION INTRAVENOUS at 09:52

## 2021-03-19 RX ADMIN — FENTANYL CITRATE 100 MCG: 50 INJECTION, SOLUTION INTRAMUSCULAR; INTRAVENOUS at 14:18

## 2021-03-19 RX ADMIN — HYDRALAZINE HYDROCHLORIDE 5 MG: 20 INJECTION INTRAMUSCULAR; INTRAVENOUS at 15:29

## 2021-03-19 RX ADMIN — MORPHINE SULFATE 2 MG: 4 INJECTION INTRAVENOUS at 22:37

## 2021-03-19 RX ADMIN — CARVEDILOL 25 MG: 25 TABLET, FILM COATED ORAL at 12:01

## 2021-03-19 RX ADMIN — HYDRALAZINE HYDROCHLORIDE 5 MG: 20 INJECTION INTRAMUSCULAR; INTRAVENOUS at 15:36

## 2021-03-19 ASSESSMENT — ENCOUNTER SYMPTOMS
TINGLING: 0
CHILLS: 0
CONSTIPATION: 0
HEADACHES: 0
WEIGHT LOSS: 0
FALLS: 0
BACK PAIN: 1
DIARRHEA: 0
SPUTUM PRODUCTION: 0
BLOOD IN STOOL: 0
MYALGIAS: 0
COUGH: 1
COUGH: 0
DIZZINESS: 0
LOSS OF CONSCIOUSNESS: 0
PALPITATIONS: 0
DIZZINESS: 1
FEVER: 0
MUSCULOSKELETAL NEGATIVE: 1
ABDOMINAL PAIN: 0
DEPRESSION: 0
ORTHOPNEA: 0
SHORTNESS OF BREATH: 1
FLANK PAIN: 1
VOMITING: 0
NAUSEA: 0
STRIDOR: 0
CLAUDICATION: 0
NAUSEA: 1
HEMOPTYSIS: 0
VOMITING: 1
WEAKNESS: 0
ABDOMINAL PAIN: 1

## 2021-03-19 ASSESSMENT — PATIENT HEALTH QUESTIONNAIRE - PHQ9
SUM OF ALL RESPONSES TO PHQ9 QUESTIONS 1 AND 2: 0
1. LITTLE INTEREST OR PLEASURE IN DOING THINGS: NOT AT ALL
1. LITTLE INTEREST OR PLEASURE IN DOING THINGS: NOT AT ALL
2. FEELING DOWN, DEPRESSED, IRRITABLE, OR HOPELESS: NOT AT ALL
SUM OF ALL RESPONSES TO PHQ9 QUESTIONS 1 AND 2: 0
2. FEELING DOWN, DEPRESSED, IRRITABLE, OR HOPELESS: NOT AT ALL

## 2021-03-19 ASSESSMENT — COGNITIVE AND FUNCTIONAL STATUS - GENERAL
SUGGESTED CMS G CODE MODIFIER MOBILITY: CI
MOBILITY SCORE: 23
DAILY ACTIVITIY SCORE: 24
SUGGESTED CMS G CODE MODIFIER DAILY ACTIVITY: CH
SUGGESTED CMS G CODE MODIFIER DAILY ACTIVITY: CH
CLIMB 3 TO 5 STEPS WITH RAILING: A LITTLE
SUGGESTED CMS G CODE MODIFIER MOBILITY: CI
MOBILITY SCORE: 23
DAILY ACTIVITIY SCORE: 24
CLIMB 3 TO 5 STEPS WITH RAILING: A LITTLE

## 2021-03-19 ASSESSMENT — LIFESTYLE VARIABLES
TOTAL SCORE: 0
HAVE YOU EVER FELT YOU SHOULD CUT DOWN ON YOUR DRINKING: NO
EVER HAD A DRINK FIRST THING IN THE MORNING TO STEADY YOUR NERVES TO GET RID OF A HANGOVER: NO
HAVE PEOPLE ANNOYED YOU BY CRITICIZING YOUR DRINKING: NO
TOTAL SCORE: 0
AVERAGE NUMBER OF DAYS PER WEEK YOU HAVE A DRINK CONTAINING ALCOHOL: 0
ON A TYPICAL DAY WHEN YOU DRINK ALCOHOL HOW MANY DRINKS DO YOU HAVE: 0
EVER FELT BAD OR GUILTY ABOUT YOUR DRINKING: NO
HOW MANY TIMES IN THE PAST YEAR HAVE YOU HAD 5 OR MORE DRINKS IN A DAY: 0
TOTAL SCORE: 0
ALCOHOL_USE: NO
CONSUMPTION TOTAL: NEGATIVE

## 2021-03-19 ASSESSMENT — PAIN DESCRIPTION - PAIN TYPE: TYPE: CHRONIC PAIN

## 2021-03-19 ASSESSMENT — FIBROSIS 4 INDEX
FIB4 SCORE: 4.35
FIB4 SCORE: 3.54

## 2021-03-19 ASSESSMENT — PAIN SCALES - GENERAL: PAIN_LEVEL: 0

## 2021-03-19 NOTE — ASSESSMENT & PLAN NOTE
-Patient does receive hemodialysis Monday, Wednesday and Friday  -Nephrology consulted, follow-up on recommendations  -Continue with dialysis per nephrology

## 2021-03-19 NOTE — ASSESSMENT & PLAN NOTE
-ERP did perform a rectal exam, there was no stool in the rectal vault but the mucus was Hemoccult positive.  She had several episodes of coffee-ground emesis on the morning of 3/19.  -Patient received 2 units RBC on 3/19, pending repeat hemoglobin check  -Continue to monitor H&H, transfuse for hemoglobin<7  -GI consulted, discussed with Dr. Mcqueen, plan is for EGD 3/19 afternoon  -N.p.o.  -Continue with IV Protonix, Carafate  -Patient will be transferred to ICU

## 2021-03-19 NOTE — PROGRESS NOTES
"Hospital Medicine Daily Progress Note    Date of Service  3/19/2021    Chief Complaint  25 y.o. female admitted 3/19/2021 with dizziness and shortness of breath    Hospital Course  Per notes, \"25 y.o. female who presented 3/19/2021 with shortness of breath and dizziness.  Patient does have chronic anemia, went to have her blood checked today, hemoglobin was 6.4.  They decided to attempt to get blood ready for dialysis tomorrow but told her if she became symptomatic she needed to come to the emergency department.  She did begin experiencing shortness of breath and dizziness so she presented to the emergency department.  While here, her hemoglobin has been decreasing.  She did have a bowel movement today and states it was brown with no sign of blood or melena.  She did recently have a GI bleed with Karen-Keyes tear, she also has a history of gastritis.  Patient is on Carafate and omeprazole and has been taking her meds.  Patient does have antibodies so her blood does take some time to obtain.  She also has end-stage renal disease and gets hemodialysis Monday, Wednesday and Friday.\"    On admission hemoglobin was 5.8, she was transfused 1 unit RBC.  She was found to be in acute respiratory failure requiring supplemental oxygen.  She developed coffee-ground emesis on 3/19 after admission.  GI was consulted and she was scheduled for EGD.       Interval Problem Update  Patient seen and examined by me at bedside this morning, she was in significant respiratory distress likely due to volume overload, she was receiving dialysis at time of examination.  Per nursing she did have episodes of coffee-ground emesis.  She has had a few sips of water, did have nausea and vomiting afterwards, otherwise has not eaten since 3/18.    Discussed with GI: Plan is for EGD on 3/19  Discussed with critical care, will transfer patient to ICU    Consultants/Specialty  Critical care  Gastroenterology  Nephrology    Code Status  Full " Code    Disposition  Transfer to ICU    Review of Systems  Review of Systems   Constitutional: Positive for malaise/fatigue. Negative for chills and fever.   Respiratory: Positive for cough and shortness of breath.    Cardiovascular: Negative for chest pain, orthopnea and claudication.   Gastrointestinal: Positive for abdominal pain, nausea and vomiting. Negative for blood in stool and melena.   Neurological: Positive for dizziness.   All other systems reviewed and are negative.       Physical Exam  Temp:  [36.1 °C (97 °F)-36.7 °C (98 °F)] 36.4 °C (97.5 °F)  Pulse:  [101-127] 116  Resp:  [16-28] 18  BP: (155-174)/() 168/106  SpO2:  [89 %-100 %] 100 %    Physical Exam  Vitals and nursing note reviewed.   Constitutional:       Appearance: She is ill-appearing.   Cardiovascular:      Rate and Rhythm: Regular rhythm. Tachycardia present.      Pulses: Normal pulses.      Heart sounds: Normal heart sounds.   Pulmonary:      Effort: Respiratory distress present.      Breath sounds: No stridor. No wheezing, rhonchi or rales.   Abdominal:      General: Abdomen is flat. Bowel sounds are normal. There is no distension.      Palpations: Abdomen is soft. There is no mass.      Tenderness: There is no abdominal tenderness.   Musculoskeletal:      Right lower leg: No edema.      Left lower leg: No edema.   Skin:     General: Skin is warm and dry.      Coloration: Skin is pale.   Neurological:      General: No focal deficit present.      Mental Status: She is alert and oriented to person, place, and time.         Fluids    Intake/Output Summary (Last 24 hours) at 3/19/2021 1147  Last data filed at 3/19/2021 0815  Gross per 24 hour   Intake 400 ml   Output --   Net 400 ml       Laboratory  Recent Labs     03/17/21  2151 03/18/21  1130 03/19/21  0210   WBC 3.2* 2.6* 3.4*   RBC 2.69* 2.44* 2.22*   HEMOGLOBIN 7.0* 6.4* 5.8*   HEMATOCRIT 24.5* 22.3* 20.2*   MCV 91.1 91.4 91.0   MCH 26.0* 26.2* 26.1*   MCHC 28.6* 28.7* 28.7*   RDW  66.4* 66.5* 66.0*   PLATELETCT 49* 48* 59*   MPV  --   --  13.7*     Recent Labs     03/17/21  2151 03/19/21  0210   SODIUM 132* 131*   POTASSIUM 3.8 3.7   CHLORIDE 92* 93*   CO2 33 28   GLUCOSE 100* 91   BUN 17 32*   CREATININE 3.49* 5.47*   CALCIUM 8.5 8.6                   Imaging  No orders to display        Assessment/Plan  Symptomatic anemia- (present on admission)  Assessment & Plan  -With Hemoccult positive mucus in the rectal vault, coffee-ground emesis in a.m.   -Continue to monitor H&H  -This is acute on chronic anemia  -Follow-up iron studies    GIB (gastrointestinal bleeding)- (present on admission)  Assessment & Plan  -ERP did perform a rectal exam, there was no stool in the rectal vault but the mucus was Hemoccult positive.  She had several episodes of coffee-ground emesis on the morning of 3/19.  -Patient received 2 units RBC on 3/19, pending repeat hemoglobin check  -Continue to monitor H&H, transfuse for hemoglobin<7  -GI consulted, discussed with Dr. Mcqueen, plan is for EGD 3/19 afternoon  -N.p.o.  -Continue with IV Protonix, Carafate  -Patient will be transferred to ICU    Lupus (HCC)- (present on admission)  Assessment & Plan  -Continue home meds    ESRD (end stage renal disease) on dialysis (HCC)- (present on admission)  Assessment & Plan  -Patient does receive hemodialysis Monday, Wednesday and Friday  -Nephrology consulted, follow-up on recommendations  -Continue with dialysis per nephrology    Anemia due to blood loss, acute- (present on admission)  Assessment & Plan  Patient does have a history of chronic anemia, she has acute worsening due to acute blood loss, coffee-ground emesis in the morning on 3/19.  -Continue to monitor H&H  -See above problem list for GI bleed    Acute hypoxemic respiratory failure (HCC)- (present on admission)  Assessment & Plan  Patient was found to be tachypneic, significant respiratory distress requiring 5L supplemental O2 use on admission.  Likely due to  volume overload.  -Continue with supplemental O2 and wean as tolerated  -Continuous O2 monitoring  -Continue with dialysis per nephrology  -Patient will be transferred to ICU    Pancytopenia (HCC)- (present on admission)  Assessment & Plan  -Chronic secondary to underlying conditions, lupus and ESRD, with acute worsening of her hemoglobin  -Follow-up on labs    Hyponatremia- (present on admission)  Assessment & Plan  -Mild, due to fluid overload  -Continue home Lasix and HD per nephrology    Elevated brain natriuretic peptide (BNP) level- (present on admission)  Assessment & Plan  -Chronically greater than 35,000    Hypertension- (present on admission)  Assessment & Plan  -Continue home amlodipine and Coreg  -Start as needed labetalol  -Continue with dialysis per nephrology  -Adjust as needed       VTE prophylaxis: SCDs

## 2021-03-19 NOTE — PROGRESS NOTES
[facility] Progress Note        HD today x 3 hours per Dr.Najjar Fadi. Initiated at 0900 and ended at 1200.   Patient alert and oriented x 3.    Patient finished  Blood transfusion prior to start of HD.      UF Net: 2500 mL  Patient tolerated tx. Awake alert post treatment.        See paper flowsheet for details.    AVF Acecess  - bleeding, +Thrill and Bruit. Applied dressing to sights.    Report Given to Bedside RN Shaista Sheikh RN.

## 2021-03-19 NOTE — ASSESSMENT & PLAN NOTE
-With Hemoccult positive mucus in the rectal vault, coffee-ground emesis in a.m.   -Continue to monitor H&H  -This is acute on chronic anemia  -Follow-up iron studies

## 2021-03-19 NOTE — ED NOTES
Lab called with critical result of hemoglobin at 5.8. Critical lab result read back.   Dr. Horton notified of critical lab result.

## 2021-03-19 NOTE — HOSPITAL COURSE
"Per notes, \"25 y.o. female who presented 3/19/2021 with shortness of breath and dizziness.  Patient does have chronic anemia, went to have her blood checked today, hemoglobin was 6.4.  They decided to attempt to get blood ready for dialysis tomorrow but told her if she became symptomatic she needed to come to the emergency department.  She did begin experiencing shortness of breath and dizziness so she presented to the emergency department.  While here, her hemoglobin has been decreasing.  She did have a bowel movement today and states it was brown with no sign of blood or melena.  She did recently have a GI bleed with Karen-Keyes tear, she also has a history of gastritis.  Patient is on Carafate and omeprazole and has been taking her meds.  Patient does have antibodies so her blood does take some time to obtain.  She also has end-stage renal disease and gets hemodialysis Monday, Wednesday and Friday.\"    On admission hemoglobin was 5.8, she was transfused 1 unit RBC.  She was found to be in acute respiratory failure requiring supplemental oxygen.  She developed coffee-ground emesis on 3/19 after admission.  GI was consulted and she was scheduled for EGD.   "

## 2021-03-19 NOTE — PROGRESS NOTES
Telemetry Shift Summary     Measurement for strip printed at:  Rhythm:ST  Rate:101-103  Measurements: .16/.08/.36  Ectopy (reported by Monitor Tech and verified by RN): none     Normal Values  Rhythm: Sinus  HR:   Measurements: 0.12-0.20/0.06-0.10/0.30-0.52

## 2021-03-19 NOTE — PROGRESS NOTES
GI Attending  See dictated GI Consultation note dated 3/19/21 for details.  To summarize, patient with chronic intermittent nausea and vomiting, presents with worsening anemia without overt GI bleeding.  Cofee grounds emesis this morning.  Recent EGD revealed a Karen Keyes Tear just distal to the GE junction.  Plan for Anesthesiologist assisted EGD for diagnostic and potentially therapeutic purposes today.  RBA held with patient and peyman and witnessed consent on chart.

## 2021-03-19 NOTE — PROGRESS NOTES
Spoke with Dr Piña regarding patient possibly requiring higher level of care.  Patient on schedule for EGD at 1330, currently getting dialysis.  Per Dr Piña, Coreg dose that was held this morning given.  Stat H&H ordered.

## 2021-03-19 NOTE — ED PROVIDER NOTES
ED Provider Note    Primary care provider: Ella Matthew M.D.  Means of arrival: private vehicle  History obtained from: patient  History limited by: none    CHIEF COMPLAINT  Chief Complaint   Patient presents with   • Shortness of Breath       HPI  Lily Nicole is a 25 y.o. female with past medical history significant for end-stage renal disease (dialysis MWF), lupus, gastritis, and chronic anemia who presents to the Emergency Department for shortness of breath and dizziness.Per chart review patient was seen last night for shortness of breath and dizziness.  Last night and hemoglobin was 7 last night, her shortness of breath was discussed with on call nephrology specialist Dr. Najjar who recommended outpatient follow-up. She subsequently went to her outpatient infusion center today where her hemoglobin was 6.4 and platelets 48,000.  Plan was to have an outpatient infusion set up in a couple of days, however patient was advised to return to the emergency department if she became more symptomatic.  Patient reports worsening shortness of breath and dizziness today and therefore came in for further evaluation and management.      Patient was admitted on 3/4/2021 with anemia, at that time she was having hematemesis and was found to have a Karen-Keyes tear on endoscopy.  Patient currently denies any symptoms that she had when she had her GI bleed.  She denies hematemesis, chest pain, abdominal pain and dark stools.  Patient did have her last dialysis 3/17/21 without complication. She is again due for dialysis today.     Patient complaining of some mild back pain that is throbbing, this is consistent with her chronic pain related to her renal disease.  She also has associated nausea which is again chronic for her.    REVIEW OF SYSTEMS  Review of Systems   Constitutional: Negative for fever.   Respiratory: Positive for shortness of breath. Negative for cough.    Cardiovascular: Negative for chest pain.    Gastrointestinal: Positive for nausea. Negative for abdominal pain, blood in stool, melena and vomiting.   Genitourinary: Positive for flank pain.   Musculoskeletal: Positive for back pain.   Neurological: Positive for dizziness.   All other systems reviewed and are negative.        PAST MEDICAL HISTORY   has a past medical history of Anemia (01/17/2018), AVF (arteriovenous fistula) ([facility]), Dialysis patient ([facility]), ESRD (end stage renal disease) on dialysis ([facility]) (01/17/2018), Heart burn, Hypertension (01/17/2018), Indigestion, Lupus ([facility]), Migraines (01/17/2018), and Seizure ([facility]) (2013).    SURGICAL HISTORY   has a past surgical history that includes ronak by laparoscopy (4/5/2010); av fistula creation (Right); angioplasty (01/17/2018); other; other; gastroscopy-endo (12/9/2019); gastroscopy (N/A, 5/30/2020); gastroscopy-endo (9/18/2020); upper gi endoscopy,ctrl bleed (11/12/2020); upper gi endoscopy,biopsy (11/12/2020); gastroscopy-endo (11/12/2020); colonoscopy,diagnostic (1/8/2021); upper gi endoscopy,diagnosis (1/8/2021); upper gi endoscopy,ctrl bleed (1/8/2021); and upper gi endoscopy,diagnosis (3/5/2021).    SOCIAL HISTORY  Social History     Tobacco Use   • Smoking status: Never Smoker   • Smokeless tobacco: Never Used   Substance Use Topics   • Alcohol use: No   • Drug use: No      Social History     Substance and Sexual Activity   Drug Use No       FAMILY HISTORY  Family History   Problem Relation Age of Onset   • Diabetes Paternal Grandmother        CURRENT MEDICATIONS  Home Medications     Reviewed by Deandra Boudreaux R.N. (Registered Nurse) on 03/19/21 at 0139  Med List Status: Partial   Medication Last Dose Status   acetaminophen (TYLENOL) 500 MG Tab  Active   amLODIPine (NORVASC) 10 MG Tab  Active   carvedilol (COREG) 25 MG Tab  Active   furosemide (LASIX) 40 MG Tab  Active   hydroxychloroquine (PLAQUENIL) 200 MG Tab  Active   metoclopramide (REGLAN) 10 MG Tab  Active   mycophenolate (MYFORTIC) 180 MG  EC tablet  Active   omeprazole (PRILOSEC) 40 MG delayed-release capsule  Active   ondansetron (ZOFRAN ODT) 4 MG TABLET DISPERSIBLE  Active   ondansetron (ZOFRAN ODT) 4 MG TABLET DISPERSIBLE  Active   pantoprazole (PROTONIX) 40 MG Tablet Delayed Response  Active   predniSONE (DELTASONE) 5 MG Tab  Active   sucralfate (CARAFATE) 1 GM/10ML Suspension  Active   traMADol (ULTRAM) 50 MG Tab  Active                ALLERGIES  Allergies   Allergen Reactions   • Cephalexin Rash     .   • Clindamycin Rash     .   • Methylprednisolone Unspecified     Anxious   • Metoprolol Rash     .       PHYSICAL EXAM  VITAL SIGNS: Pulse (!) 113   Temp 36.1 °C (97 °F) (Temporal)   Resp (!) 26   Wt 58.8 kg (129 lb 10.1 oz)   SpO2 89% Comment: when she came in without oxygen; normally wears 3L  BMI 20.96 kg/m²   Vitals reviewed by myself.  Physical Exam   Constitutional: She is oriented to person, place, and time.   Chronically ill appearing   HENT:   Head: Normocephalic and atraumatic.   Eyes: Pupils are equal, round, and reactive to light.   Cardiovascular:   Tachycardic rate, regular rhythm   Pulmonary/Chest: Effort normal and breath sounds normal. No respiratory distress. She has no wheezes. She has no rales.   Abdominal: Soft. She exhibits no distension. There is no abdominal tenderness. There is no rebound and no guarding.   Musculoskeletal:         General: Normal range of motion.   Neurological: She is alert and oriented to person, place, and time.   Skin:   Pallor noted on exam         DIAGNOSTIC STUDIES /  LABS  Labs Reviewed   CBC WITH DIFFERENTIAL   BASIC METABOLIC PANEL   COD (ADULT)   SARS-COV-2, PCR (IN-HOUSE)   TRANSFUSE RED BLOOD CELLS-NURSING COMMUNICATION (UNITS)       All labs reviewed by me.      COURSE & MEDICAL DECISION MAKING  Nursing notes, VS, PMSFHx reviewed in chart.    Patient is a 25-year-old female who comes in for evaluation of shortness of breath and dizziness.  The most likely etiology of her symptoms is  symptomatic anemia given her recent hemoglobin of 6.4.  I will also obtain screening labs as she is due for a dialysis to ensure that she does not have electrolyte disturbance or metabolic derangement.  At baseline patient is on 3 L of oxygen at home and she is saturating well on this.  Chest x-ray yesterday demonstrated pulmonary edema, consistent with her underlying end-stage renal disease, no evidence of acute infection, therefore I do not believe repeat chest x-ray is indicated as she is not having infectious symptoms related to her shortness of breath.  Plan will be to obtain baseline labs and transfuse patient for her symptomatic anemia.  As she is known to have antibodies the process may take a while and therefore she will be hospitalized for ongoing care.  Patient is amenable to this plan.  COVID-19 swab was sent for screening and patient is consented for blood transfusion.  I spoke with blood bank at [facility] and they will send over her already cross typed and matched blood from earlier today to be transfused.  She is also treated with morphine and Zofran for management of her chronic pain and nausea.  Nursing was able to obtain peripheral IV access.  Patient will be hospitalized for ongoing monitoring, discussed the case with Dr. Styles who has accepted patient hospitalization.     Patient's hemoglobin returned at 5.8, this is a drop from 7 in the last 24 hours.  Therefore I performed a rectal exam, there is no stool in the rectal vault however mucus was guaiac positive.  Although she is not having obvious symptoms of GI bleeding I discussed this with Dr. Styles who will start her on IV Protonix given her history.      I have explained to the patient the risks and benefits of transfusion of blood products.  This includes, as appropriate, the risk of mild allergic reaction, hemolytic reaction, transfusion-associated lung injury, febrile reactions, circulatory or iron overload, and infection.    We  discussed possible alternatives and their risks, including directed donation, autologous transfusion, and no transfusion, including IV or oral iron supplementation, as appropriate.  I believe the patient understands the risks and benefits and was able to express understanding.        FINAL IMPRESSION  1. Symptomatic anemia    2. Dyspnea, unspecified type    3. Dizziness

## 2021-03-19 NOTE — ANESTHESIA PREPROCEDURE EVALUATION
Pt had dialysis today.  Will proceed pending NL K+ value.    Relevant Problems   CARDIAC   (+) Arteriovenous fistula, acquired (HCC)   (+) Hemorrhagic gastritis with hematemesis    (+) Hypertension   (+) Renovascular hypertension      GI   (+) Gastroesophageal reflux disease without esophagitis         (+) Chronic kidney disease (CKD) stage G5/A1, glomerular filtration rate (GFR) less than or equal to 15 mL/min/1.73 square meter and albuminuria creatinine ratio less than 30 mg/g (HCC)   (+) ESRD (end stage renal disease) on dialysis (HCC)       Physical Exam    Airway   Mallampati: II  TM distance: >3 FB  Neck ROM: full       Cardiovascular - normal exam  Rhythm: regular  Rate: normal  (-) murmur     Dental - normal exam           Pulmonary - normal exam  Breath sounds clear to auscultation     Abdominal    Neurological - normal exam         Other findings: Pt is tachycardic 110 - 120 bmp. This is baseline for this pt.            Anesthesia Plan    ASA 3   ASA physical status 3 criteria: ESRD undergoing regularly scheduled dialysis    Plan - MAC                         Informed Consent:

## 2021-03-19 NOTE — H&P
Hospital Medicine History & Physical Note    Date of Service  3/19/2021    Primary Care Physician  Ella Matthew M.D.    Consultants  None    Code Status  Full Code    Chief Complaint  Chief Complaint   Patient presents with   • Shortness of Breath       History of Presenting Illness  25 y.o. female who presented 3/19/2021 with shortness of breath and dizziness.  Patient does have chronic anemia, went to have her blood checked today, hemoglobin was 6.4.  They decided to attempt to get blood ready for dialysis tomorrow but told her if she became symptomatic she needed to come to the emergency department.  She did begin experiencing shortness of breath and dizziness so she presented to the emergency department.  While here, her hemoglobin has been decreasing.  She did have a bowel movement today and states it was brown with no sign of blood or melena.  She did recently have a GI bleed with Karen-Keyes tear, she also has a history of gastritis.  Patient is on Carafate and omeprazole and has been taking her meds.  Patient does have antibodies so her blood does take some time to obtain.  She also has end-stage renal disease and gets hemodialysis Monday, Wednesday and Friday.  I did discuss the case including labs with ER physician.    Review of Systems  Review of Systems   Constitutional: Negative for chills, fever and malaise/fatigue.   HENT: Negative for congestion.    Respiratory: Positive for shortness of breath. Negative for cough, sputum production and stridor.    Cardiovascular: Negative for chest pain, palpitations and leg swelling.   Gastrointestinal: Positive for nausea (chronic ). Negative for abdominal pain, constipation, diarrhea and vomiting.   Genitourinary: Negative for dysuria and urgency.   Musculoskeletal: Negative for falls and myalgias.   Neurological: Positive for dizziness. Negative for tingling, loss of consciousness, weakness and headaches.   Psychiatric/Behavioral: Negative for depression  and suicidal ideas.   All other systems reviewed and are negative.      Past Medical History   has a past medical history of Anemia (01/17/2018), AVF (arteriovenous fistula) ([facility]), Dialysis patient ([facility]), ESRD (end stage renal disease) on dialysis ([facility]) (01/17/2018), Heart burn, Hypertension (01/17/2018), Indigestion, Lupus ([facility]), Migraines (01/17/2018), and Seizure ([facility]) (2013).    Surgical History   has a past surgical history that includes ronak by laparoscopy (4/5/2010); av fistula creation (Right); angioplasty (01/17/2018); other; other; gastroscopy-endo (12/9/2019); gastroscopy (N/A, 5/30/2020); gastroscopy-endo (9/18/2020); pr upper gi endoscopy,ctrl bleed (11/12/2020); pr upper gi endoscopy,biopsy (11/12/2020); gastroscopy-endo (11/12/2020); pr colonoscopy,diagnostic (1/8/2021); pr upper gi endoscopy,diagnosis (1/8/2021); pr upper gi endoscopy,ctrl bleed (1/8/2021); and pr upper gi endoscopy,diagnosis (3/5/2021).     Family History  family history includes Diabetes in her paternal grandmother.     Social History   reports that she has never smoked. She has never used smokeless tobacco. She reports that she does not drink alcohol and does not use drugs.    Allergies  Allergies   Allergen Reactions   • Cephalexin Rash     .   • Clindamycin Rash     .   • Methylprednisolone Unspecified     Anxious   • Metoprolol Rash     .       Medications  Prior to Admission Medications   Prescriptions Last Dose Informant Patient Reported? Taking?   acetaminophen (TYLENOL) 500 MG Tab  Patient Yes No   Sig: Take 500 mg by mouth every 6 hours as needed for Moderate Pain.   amLODIPine (NORVASC) 10 MG Tab   No No   Sig: Take 1 tablet by mouth every evening.   carvedilol (COREG) 25 MG Tab   No No   Sig: Take 1 tablet by mouth 2 times a day, with meals for 30 days.   furosemide (LASIX) 40 MG Tab  Patient Yes No   Sig: Take 40 mg by mouth 2 (two) times a day.   hydroxychloroquine (PLAQUENIL) 200 MG Tab  Patient Yes No   Sig: Take  200 mg by mouth every evening.   metoclopramide (REGLAN) 10 MG Tab   No No   Sig: Take 1 tablet by mouth 3 times a day before meals for 25 days.   mycophenolate (MYFORTIC) 180 MG EC tablet  Patient No No   Sig: Take 1 Tab by mouth every evening.   omeprazole (PRILOSEC) 40 MG delayed-release capsule   No No   Sig: Take 1 capsule by mouth every day for 18 days.   ondansetron (ZOFRAN ODT) 4 MG TABLET DISPERSIBLE   No No   Sig: Take 1 tablet by mouth every four hours as needed for Nausea (give PO if no IV route available).   ondansetron (ZOFRAN ODT) 4 MG TABLET DISPERSIBLE   No No   Sig: Take 1 tablet by mouth every 6 hours as needed for Nausea.   pantoprazole (PROTONIX) 40 MG Tablet Delayed Response  Patient No No   Sig: Take 1 Tab by mouth 2 times a day.   predniSONE (DELTASONE) 5 MG Tab  Patient Yes No   Sig: Take 5 mg by mouth every evening.   sucralfate (CARAFATE) 1 GM/10ML Suspension  Patient No No   Sig: Take 10 mL by mouth 4 Times a Day,Before Meals and at Bedtime.   Patient taking differently: Take 1 g by mouth 2 Times a Day. Before lunch and before dinner   traMADol (ULTRAM) 50 MG Tab   No No   Sig: Take 1 tablet by mouth every 6 hours as needed for Moderate Pain for up to 14 days.      Facility-Administered Medications: None       Physical Exam  Temp:  [36.1 °C (97 °F)] 36.1 °C (97 °F)  Pulse:  [113-114] 114  Resp:  [26] 26  BP: (175)/(113) 175/113  SpO2:  [89 %-100 %] 100 %    Physical Exam  Vitals and nursing note reviewed.   Constitutional:       General: She is not in acute distress.     Appearance: She is well-developed. She is ill-appearing. She is not diaphoretic.   HENT:      Head: Normocephalic and atraumatic.      Right Ear: External ear normal.      Left Ear: External ear normal.      Nose: Nose normal. No congestion or rhinorrhea.      Mouth/Throat:      Mouth: Mucous membranes are moist.      Pharynx: No oropharyngeal exudate.   Eyes:      General:         Right eye: No discharge.         Left  eye: No discharge.      Extraocular Movements: Extraocular movements intact.   Neck:      Trachea: No tracheal deviation.   Cardiovascular:      Rate and Rhythm: Normal rate and regular rhythm.      Heart sounds: No murmur. No friction rub. No gallop.    Pulmonary:      Effort: Pulmonary effort is normal. No respiratory distress.      Breath sounds: Normal breath sounds. No stridor. No wheezing or rales.   Chest:      Chest wall: No tenderness.   Abdominal:      General: Bowel sounds are normal. There is no distension.      Palpations: Abdomen is soft.      Tenderness: There is no abdominal tenderness.   Musculoskeletal:         General: No tenderness. Normal range of motion.      Cervical back: Normal range of motion and neck supple.      Right lower leg: No edema.      Left lower leg: No edema.   Lymphadenopathy:      Cervical: No cervical adenopathy.   Skin:     General: Skin is warm and dry.      Coloration: Skin is not jaundiced.      Findings: No erythema or rash.   Neurological:      General: No focal deficit present.      Mental Status: She is alert and oriented to person, place, and time.      Cranial Nerves: No cranial nerve deficit.   Psychiatric:         Mood and Affect: Mood normal.         Behavior: Behavior normal.         Thought Content: Thought content normal.         Judgment: Judgment normal.         Laboratory:  Recent Labs     03/17/21 2151 03/18/21  1130   WBC 3.2* 2.6*   RBC 2.69* 2.44*   HEMOGLOBIN 7.0* 6.4*   HEMATOCRIT 24.5* 22.3*   MCV 91.1 91.4   MCH 26.0* 26.2*   MCHC 28.6* 28.7*   RDW 66.4* 66.5*   PLATELETCT 49* 48*     Recent Labs     03/17/21 2151   SODIUM 132*   POTASSIUM 3.8   CHLORIDE 92*   CO2 33   GLUCOSE 100*   BUN 17   CREATININE 3.49*   CALCIUM 8.5     Recent Labs     03/17/21 2151   ALTSGPT 7   ASTSGOT 24   ALKPHOSPHAT 68   TBILIRUBIN 0.4   GLUCOSE 100*         Recent Labs     03/17/21 2151   NTPROBNP >73746*         No results for input(s): TROPONINT in the last 72  hours.    Imaging:  No orders to display         Assessment/Plan:  I anticipate this patient will require at least two midnights for appropriate medical management, necessitating inpatient admission.    Symptomatic anemia- (present on admission)  Assessment & Plan  -With Hemoccult positive mucus in the rectal vault  -At this time, presuming that it is acute worsening due to GI bleeding  -ERP has ordered transfusion, obtain hemoglobin posttransfusion and continue to transfuse for hemoglobin less than 7  -This will likely cause some fluid overload and the patient, greater than her baseline however she will have hemodialysis today  -May need GI consultation but at this point in time, will wait for bowel movement to ascertain the significance  -This is acute on chronic anemia  -Most recent iron was slightly low, repeat iron level    GIB (gastrointestinal bleeding)- (present on admission)  Assessment & Plan  -ERP did perform a rectal exam, there was no stool in the rectal vault but the mucus was Hemoccult positive  -Patient states her last bowel movement was today and was brown with no sign of bleeding  -For now, stop her home omeprazole and start IV Protonix  -Await BM, if there is significant bleeding, may need GI consultation  -Continue home Carafate    Lupus (HCC)- (present on admission)  Assessment & Plan  -Continue home meds    ESRD (end stage renal disease) on dialysis (HCC)- (present on admission)  Assessment & Plan  -Patient does receive hemodialysis Monday, Wednesday and Friday  -We will need nephrology consultation tomorrow for hemodialysis  -When possible, transfusions with hemodialysis    Pancytopenia (HCC)- (present on admission)  Assessment & Plan  -Chronic with acute worsening of her hemoglobin  -Repeat CBC in the morning    Hyponatremia- (present on admission)  Assessment & Plan  -Mild, due to fluid overload  -Continue home Lasix and await hemodialysis    Elevated brain natriuretic peptide (BNP) level-  (present on admission)  Assessment & Plan  -Chronically greater than 35,000    Hypertension- (present on admission)  Assessment & Plan  -Continue home amlodipine and Coreg  -Start as needed labetalol  -Adjust as needed

## 2021-03-19 NOTE — OR NURSING
1453 Arrived to PACU. Airway patent. Lungs clear. Report received from anesthesia.   1509 Dr. Mcqueen at bedside to speak with patient.   1514 Hydralazine given for elevated b/p, see MAR.  1518 Pt c/o nausea, medicated see MAR.  1540 Patient c/o pain 7/10, medicated see MAR.   1603 Telemetry signal received, per [employer] monitor tech.   1614 Pt met criteria for transfer back to telemetry. VSS. Pt states relief of nausea. Pt states good pain control.

## 2021-03-19 NOTE — ED TRIAGE NOTES
Pt sent here by infusion center for blood transfusion and dialysis.    Pt reports shortness of breath increasing over the last week.     Pt also c/o chronic back pain, currently rated 8/10.    Pt normally wears 3L oxygen via nasal canula but came in on room air; reports she was off for 10 minutes prior to arrival. Pt sat in 80s on arrival. Immediately placed on 3L in the room

## 2021-03-19 NOTE — CONSULTS
Pulmonary Consultation    Date of consult: 3/19/2021    Referring Physician  Eric Pierre M.D.    Reason for Consultation  Anemia     History of Presenting Illness  25 y.o. female who presented 3/19/2021 with anemia. Patient with significant history of ESRD on HD every M/W/F, SLE, HTN, seizure, and chronic anemia presents with anemia and shortness of breath evaluation. She was recently admitted on 3/4 for coffee ground emesis and EGD showed nida-thao tear at which GI recommended PPI for three weeks. Hb upon discharge was 7.7. Since hospital discharge, she has been doing well until yesterday she had lab done which showed Hb 6.4. At that time, she also noted having shortness of breath and dizziness which she presents to the ER. Repeat CBC showed Hb 5.8. She was transfused 3 units PRBC and repeat Hb 9.2. She was also noted to be tachycardia with SBP in the 160s at which her coreg was held for unknown reason. She completed HD this morning and is currently in pre-op awaiting for EGD.     Subjectively, patient reports feeling better overall. Denies chest pain, N/V, melena, abdominal pain, or cough. She state having mild shortness of breath. She state her HR is always elevated. Current pulse oximetry is 100% on 2 liters NC.     Code Status  Full Code    Review of Systems  Review of Systems   Constitutional: Positive for malaise/fatigue. Negative for chills, fever and weight loss.   Respiratory: Positive for shortness of breath. Negative for cough, hemoptysis and sputum production.    Cardiovascular: Negative for chest pain, palpitations, orthopnea, claudication and leg swelling.   Gastrointestinal: Negative for abdominal pain, nausea and vomiting.   Genitourinary: Negative.    Musculoskeletal: Negative.    Neurological: Negative for dizziness and headaches.       Past Medical History   has a past medical history of Anemia (01/17/2018), AVF (arteriovenous fistula) ([facility]), Dialysis patient ([facility]), ESRD (end stage renal  disease) on dialysis ([facility]) (01/17/2018), Heart burn, Hypertension (01/17/2018), Indigestion, Lupus (HCC), Migraines (01/17/2018), and Seizure ([facility]) (2013).    Surgical History   has a past surgical history that includes ronak by laparoscopy (4/5/2010); av fistula creation (Right); angioplasty (01/17/2018); other; other; gastroscopy-endo (12/9/2019); gastroscopy (N/A, 5/30/2020); gastroscopy-endo (9/18/2020); pr upper gi endoscopy,ctrl bleed (11/12/2020); pr upper gi endoscopy,biopsy (11/12/2020); gastroscopy-endo (11/12/2020); pr colonoscopy,diagnostic (1/8/2021); pr upper gi endoscopy,diagnosis (1/8/2021); pr upper gi endoscopy,ctrl bleed (1/8/2021); and pr upper gi endoscopy,diagnosis (3/5/2021).    Family History  family history includes Diabetes in her paternal grandmother.    Social History   reports that she has never smoked. She has never used smokeless tobacco. She reports that she does not drink alcohol and does not use drugs.    Medications  Home Medications     Reviewed by Ila Galarza R.N. (Registered Nurse) on 03/19/21 at 1300  Med List Status: Complete   Medication Last Dose Status   acetaminophen (TYLENOL) 500 MG Tab  Active   amLODIPine (NORVASC) 10 MG Tab  Active   carvedilol (COREG) 25 MG Tab  Active   furosemide (LASIX) 40 MG Tab  Active   hydroxychloroquine (PLAQUENIL) 200 MG Tab  Active   metoclopramide (REGLAN) 10 MG Tab  Active   mycophenolate (MYFORTIC) 180 MG EC tablet  Active   omeprazole (PRILOSEC) 40 MG delayed-release capsule  Active   ondansetron (ZOFRAN ODT) 4 MG TABLET DISPERSIBLE  Active   ondansetron (ZOFRAN ODT) 4 MG TABLET DISPERSIBLE  Active   pantoprazole (PROTONIX) 40 MG Tablet Delayed Response  Active   predniSONE (DELTASONE) 5 MG Tab  Active   sucralfate (CARAFATE) 1 GM/10ML Suspension  Active   traMADol (ULTRAM) 50 MG Tab  Active              Current Facility-Administered Medications   Medication Dose Route Frequency Provider Last Rate Last Admin   • [MAR Hold]  amLODIPine (NORVASC) tablet 10 mg  10 mg Oral Q EVENING Jorge Styles D.O.       • [MAR Hold] carvedilol (COREG) tablet 25 mg  25 mg Oral BID WITH MEALS Jorge Styles D.O.   25 mg at 03/19/21 1201   • [MAR Hold] furosemide (LASIX) tablet 40 mg  40 mg Oral BID DIURETIC ZULEIKA HeartO.   40 mg at 03/19/21 0324   • [MAR Hold] hydroxychloroquine (Plaquenil) tablet 200 mg  200 mg Oral Q EVENING ZULEIKA HeartO.       • [MAR Hold] metoclopramide (REGLAN) tablet 10 mg  10 mg Oral TID AC ZULEIKA HeartO.   10 mg at 03/19/21 1020   • [MAR Hold] predniSONE (DELTASONE) tablet 5 mg  5 mg Oral Q EVENING ZULEIKA HeartO.       • [MAR Hold] sucralfate (CARAFATE) 1 GM/10ML suspension 1 g  1 g Oral BID Jorge Styles D.O.       • [MAR Hold] senna-docusate (PERICOLACE or SENOKOT S) 8.6-50 MG per tablet 2 tablet  2 tablet Oral BID Jorge Styles D.O.        And   • [MAR Hold] polyethylene glycol/lytes (MIRALAX) PACKET 1 Packet  1 Packet Oral QDAY PRN Jorge Styles D.O.        And   • [MAR Hold] magnesium hydroxide (MILK OF MAGNESIA) suspension 30 mL  30 mL Oral QDAY PRN Jorge Styles D.O.        And   • [MAR Hold] bisacodyl (DULCOLAX) suppository 10 mg  10 mg Rectal QDAY PRN Jorge Styles D.O.       • [MAR Hold] acetaminophen (Tylenol) tablet 650 mg  650 mg Oral Q6HRS PRN ZULEIKA HeartO.   650 mg at 03/19/21 1021   • [MAR Hold] labetalol (NORMODYNE/TRANDATE) injection 10 mg  10 mg Intravenous Q4HRS PRN Jorge Styles D.O.       • [MAR Hold] ondansetron (ZOFRAN) syringe/vial injection 4 mg  4 mg Intravenous Q4HRS PRN Jorge Styles D.O.       • [MAR Hold] ondansetron (ZOFRAN ODT) dispertab 4 mg  4 mg Oral Q4HRS PRN Jorge Styles D.O.       • [MAR Hold] promethazine (PHENERGAN) tablet 12.5-25 mg  12.5-25 mg Oral Q4HRS PRN Jorge Styles D.O.       • [MAR Hold] promethazine (PHENERGAN) suppository 12.5-25 mg  12.5-25 mg Rectal Q4HRS PRN Jorge Styles D.O.       • [MAR Hold]  prochlorperazine (COMPAZINE) injection 5-10 mg  5-10 mg Intravenous Q4HRS PRN ROGE Heart.O.   10 mg at 03/19/21 0750   • [MAR Hold] pantoprazole (PROTONIX) injection 40 mg  40 mg Intravenous BID ROGE Heart.O.   40 mg at 03/19/21 0952   • PANTOPRAZOLE SODIUM 40 MG IV SOLR            • [MAR Hold] diphenhydrAMINE (BENADRYL) injection 25 mg  25 mg Intravenous Q6HRS PRN ROGE Heart.O.   25 mg at 03/19/21 1021   • [MAR Hold] traMADol (ULTRAM) 50 MG tablet 50 mg  50 mg Oral Q12HRS PRN Eric Pierre M.D.       • [MAR Hold] lidocaine (XYLOCAINE) 1 % injection 1 mL  1 mL Intradermal PRN Fadi Najjar, M.D.       • NS infusion   Intravenous Continuous Eric Pierre M.D.   Stopped at 03/19/21 1045       Allergies  Allergies   Allergen Reactions   • Cephalexin Rash     .   • Clindamycin Rash     .   • Methylprednisolone Unspecified     Anxious   • Metoprolol Rash     .       Vital Signs last 24 hours  Temp:  [36.1 °C (97 °F)-36.7 °C (98 °F)] 36.4 °C (97.5 °F)  Pulse:  [101-127] 116  Resp:  [16-28] 18  BP: (155-174)/() 168/106  SpO2:  [89 %-100 %] 100 %    Physical Exam  Physical Exam  Constitutional:       General: She is not in acute distress.     Appearance: Normal appearance. She is not ill-appearing.   HENT:      Head: Normocephalic.      Nose: Nose normal.      Mouth/Throat:      Mouth: Mucous membranes are moist.   Eyes:      Extraocular Movements: Extraocular movements intact.      Pupils: Pupils are equal, round, and reactive to light.   Cardiovascular:      Rate and Rhythm: Regular rhythm. Tachycardia present.   Pulmonary:      Comments: Decrease BS   Abdominal:      General: Abdomen is flat. Bowel sounds are normal. There is no distension.      Palpations: Abdomen is soft.   Musculoskeletal:         General: Normal range of motion.      Cervical back: Normal range of motion.      Right lower leg: No edema.      Left lower leg: No edema.   Skin:     General: Skin is warm.   Neurological:       Mental Status: She is alert and oriented to person, place, and time.      Cranial Nerves: No cranial nerve deficit.   Psychiatric:         Mood and Affect: Mood normal.         Fluids    Intake/Output Summary (Last 24 hours) at 3/19/2021 1325  Last data filed at 3/19/2021 1200  Gross per 24 hour   Intake 915.5 ml   Output 3220 ml   Net -2304.5 ml       Laboratory  Recent Results (from the past 48 hour(s))   EKG    Collection Time: 21  9:44 PM   Result Value Ref Range    Report       [facility] Emergency Dept.    Test Date:  2021  Pt Name:    CASE WOODSON            Department: [facility]  MRN:        [id number]                      Room:  Gender:     Female                       Technician: ELENA  :        1995                   Requested By:ER TRIAGE PROTOCOL  Order #:    [id number]                    Reading MD:    Measurements  Intervals                                Axis  Rate:       110                          P:          5  AZ:         166                          QRS:        2  QRSD:       86                           T:          69  QT:         336  QTc:        455    Interpretive Statements  Sinus tachycardia  Probable left atrial enlargement  RSR' in V1 or V2, right VCD or RVH  Probable left ventricular hypertrophy  Compared to ECG 2021 15:11:49  Right ventricular hypertrophy now present  RSR' in V1 or V2 now present     CBC WITHOUT DIFFERENTIAL    Collection Time: 21  9:51 PM   Result Value Ref Range    WBC 3.2 (L) 4.8 - 10.8 K/uL    RBC 2.69 (L) 4.20 - 5.40 M/uL    Hemoglobin 7.0 (L) 12.0 - 16.0 g/dL    Hematocrit 24.5 (L) 37.0 - 47.0 %    MCV 91.1 81.4 - 97.8 fL    MCH 26.0 (L) 27.0 - 33.0 pg    MCHC 28.6 (L) 33.6 - 35.0 g/dL    RDW 66.4 (H) 35.9 - 50.0 fL    Platelet Count 49 (LL) 164 - 446 K/uL   Comp Metabolic Panel    Collection Time: 21  9:51 PM   Result Value Ref Range    Sodium 132 (L) 135 - 145 mmol/L    Potassium 3.8 3.6 - 5.5  mmol/L    Chloride 92 (L) 96 - 112 mmol/L    Co2 33 20 - 33 mmol/L    Anion Gap 7.0 7.0 - 16.0    Glucose 100 (H) 65 - 99 mg/dL    Bun 17 8 - 22 mg/dL    Creatinine 3.49 (H) 0.50 - 1.40 mg/dL    Calcium 8.5 8.4 - 10.2 mg/dL    AST(SGOT) 24 12 - 45 U/L    ALT(SGPT) 7 2 - 50 U/L    Alkaline Phosphatase 68 30 - 99 U/L    Total Bilirubin 0.4 0.1 - 1.5 mg/dL    Albumin 2.9 (L) 3.2 - 4.9 g/dL    Total Protein 8.1 6.0 - 8.2 g/dL    Globulin 5.2 (H) 1.9 - 3.5 g/dL    A-G Ratio 0.6 g/dL   ESTIMATED GFR    Collection Time: 03/17/21  9:51 PM   Result Value Ref Range    GFR If  20 (A) >60 mL/min/1.73 m 2    GFR If Non  16 (A) >60 mL/min/1.73 m 2   proBrain Natriuretic Peptide, NT    Collection Time: 03/17/21  9:51 PM   Result Value Ref Range    NT-proBNP >98570 (H) 0 - 125 pg/mL   CBC WITH DIFFERENTIAL    Collection Time: 03/18/21 11:30 AM   Result Value Ref Range    WBC 2.6 (L) 4.8 - 10.8 K/uL    RBC 2.44 (L) 4.20 - 5.40 M/uL    Hemoglobin 6.4 (L) 12.0 - 16.0 g/dL    Hematocrit 22.3 (L) 37.0 - 47.0 %    MCV 91.4 81.4 - 97.8 fL    MCH 26.2 (L) 27.0 - 33.0 pg    MCHC 28.7 (L) 33.6 - 35.0 g/dL    RDW 66.5 (H) 35.9 - 50.0 fL    Platelet Count 48 (LL) 164 - 446 K/uL    Neutrophils-Polys 78.50 (H) 44.00 - 72.00 %    Lymphocytes 13.40 (L) 22.00 - 41.00 %    Monocytes 6.50 0.00 - 13.40 %    Eosinophils 0.40 0.00 - 6.90 %    Basophils 0.40 0.00 - 1.80 %    Immature Granulocytes 0.80 0.00 - 0.90 %    Nucleated RBC 0.00 /100 WBC    Neutrophils (Absolute) 2.06 2.00 - 7.15 K/uL    Lymphs (Absolute) 0.35 (L) 1.00 - 4.80 K/uL    Monos (Absolute) 0.17 0.00 - 0.85 K/uL    Eos (Absolute) 0.01 0.00 - 0.51 K/uL    Baso (Absolute) 0.01 0.00 - 0.12 K/uL    Immature Granulocytes (abs) 0.02 0.00 - 0.11 K/uL    NRBC (Absolute) 0.00 K/uL   COD - Adult (Type and Screen)    Collection Time: 03/18/21 11:30 AM   Result Value Ref Range    ABO Grouping Only O     Rh Grouping Only POS     Antibody Screen-Cod NEG     Component R        RI                  RedBloodCellsIRR    [id number]   released     21   03:31      Product Type Red Blood Cells IRR LR     Dispense Status /Released     Unit Number (Barcoded) [id number]     Product Code (Barcoded) [id number]     Blood Type (Barcoded) 5100     Component R       RI                  RedBloodCellsIRR    [id number]   released     21   03:31      Product Type Red Blood Cells IRR LR     Dispense Status /Released     Unit Number (Barcoded) [id number]     Product Code (Barcoded) [id number]     Blood Type (Barcoded) 5100    SARS-CoV-2 PCR (24 hour In-House): Collect NP swab in VTM    Collection Time: 21  1:50 AM    Specimen: Respirate   Result Value Ref Range    SARS-CoV-2 Source NP Swab     SARS-CoV-2 by PCR NotDetected    CBC WITH DIFFERENTIAL    Collection Time: 21  2:10 AM   Result Value Ref Range    WBC 3.4 (L) 4.8 - 10.8 K/uL    RBC 2.22 (L) 4.20 - 5.40 M/uL    Hemoglobin 5.8 (LL) 12.0 - 16.0 g/dL    Hematocrit 20.2 (L) 37.0 - 47.0 %    MCV 91.0 81.4 - 97.8 fL    MCH 26.1 (L) 27.0 - 33.0 pg    MCHC 28.7 (L) 33.6 - 35.0 g/dL    RDW 66.0 (H) 35.9 - 50.0 fL    Platelet Count 59 (L) 164 - 446 K/uL    MPV 13.7 (H) 9.0 - 12.9 fL    Neutrophils-Polys 75.10 (H) 44.00 - 72.00 %    Lymphocytes 16.40 (L) 22.00 - 41.00 %    Monocytes 6.70 0.00 - 13.40 %    Eosinophils 0.90 0.00 - 6.90 %    Basophils 0.30 0.00 - 1.80 %    Immature Granulocytes 0.60 0.00 - 0.90 %    Nucleated RBC 0.00 /100 WBC    Neutrophils (Absolute) 2.57 2.00 - 7.15 K/uL    Lymphs (Absolute) 0.56 (L) 1.00 - 4.80 K/uL    Monos (Absolute) 0.23 0.00 - 0.85 K/uL    Eos (Absolute) 0.03 0.00 - 0.51 K/uL    Baso (Absolute) 0.01 0.00 - 0.12 K/uL    Immature Granulocytes (abs) 0.02 0.00 - 0.11 K/uL    NRBC (Absolute) 0.00 K/uL    Hypochromia 1+     Anisocytosis 1+     Microcytosis 1+    Basic Metabolic Panel    Collection Time: 21  2:10 AM   Result Value Ref Range    Sodium 131 (L) 135 - 145 mmol/L     Potassium 3.7 3.6 - 5.5 mmol/L    Chloride 93 (L) 96 - 112 mmol/L    Co2 28 20 - 33 mmol/L    Glucose 91 65 - 99 mg/dL    Bun 32 (H) 8 - 22 mg/dL    Creatinine 5.47 (HH) 0.50 - 1.40 mg/dL    Calcium 8.6 8.4 - 10.2 mg/dL    Anion Gap 10.0 7.0 - 16.0   ABO AND RH DETERMINATION    Collection Time: 21  2:10 AM   Result Value Ref Range    ABO Grouping Only O     Rh Grouping Only POS    ANTIBODY SCREEN    Collection Time: 21  2:10 AM   Result Value Ref Range    Antibody Screen Scrn NEG    COMPONENT CELLULAR    Collection Time: 21  2:10 AM   Result Value Ref Range    Component R       RI                  RedBloodCellsIRR    [id number]   issued       21   04:50      Product Type Red Blood Cells IRR LR     Dispense Status issued     Unit Number (Barcoded) [id number]     Product Code (Barcoded) [id number]     Blood Type (Barcoded) 5100     Component R       RI                  RedBloodCellsIRR    [id number]   released     21   11:03      Product Type Red Blood Cells IRR LR     Dispense Status /Released     Unit Number (Barcoded) [id number]     Product Code (Barcoded) R3187X71     Blood Type (Barcoded) 5100    IRON/TOTAL IRON BIND    Collection Time: 21  2:10 AM   Result Value Ref Range    Iron 30 (L) 40 - 170 ug/dL    Total Iron Binding 131 (L) 250 - 450 ug/dL    Unsat Iron Binding 101 (L) 110 - 370 ug/dL    % Saturation 23 15 - 55 %   ESTIMATED GFR    Collection Time: 21  2:10 AM   Result Value Ref Range    GFR If  12 (A) >60 mL/min/1.73 m 2    GFR If Non African American 10 (A) >60 mL/min/1.73 m 2   PLATELET ESTIMATE    Collection Time: 21  2:10 AM   Result Value Ref Range    Plt Estimation Marked Decrease    MORPHOLOGY    Collection Time: 21  2:10 AM   Result Value Ref Range    RBC Morphology Present     Polychromia 1+     Poikilocytosis 1+     Ovalocytes 1+    DIFFERENTIAL COMMENT    Collection Time: 21  2:10 AM   Result Value  Ref Range    Comments-Diff see below    HEMOGLOBIN AND HEMATOCRIT    Collection Time: 03/19/21 12:32 PM   Result Value Ref Range    Hemoglobin 9.2 (L) 12.0 - 16.0 g/dL    Hematocrit 32.1 (L) 37.0 - 47.0 %       Imaging  No orders to display       Assessment/Plan  Symptomatic anemia- (present on admission)  Assessment & Plan  -- Consider etiology to be multifactorial related to ESRD vs anemia with chronic disease vs ?upper GI bleed. Recent EGD showed nida thao tear.   -- Received 3 units PRBC and repeat H/H 9.2.    -- Remains hemodynamically stable with SBP ~160s and HR 110s    -- Shock index 0.68 -> normal    -- Recommend avoiding additional blood transfusion due to risk of transfusion related adverse reaction   -- Pending EGD by GI    -- Cont protonix BID    -- Goal Hb >7      Shortness of breath  Assessment & Plan  -- Review of CXR showed mild cephalization suggestive of kerley B lines suggestive of pulmonary edema    -- Currently on 2 liters with SpO2 100%    -- Recommend HD with volume removal and avoid additional blood transfusion    -- Recommend titrating down supplemental oxygen to avoid risk of hyperoxia induced atelectasis and O2 toxicity   -- Goal SpO2 > 88%     At this time, patient is considered appropriate for floor based on the currently available data. Current nursing needs assessed and can be met in the requested unit of care. In the event of a clinical change, please call back with questions and will be happy to reassess patient.?      Discussed patient condition and risk of morbidity and/or mortality with Hospitalist, RN and Patient.

## 2021-03-19 NOTE — CARE PLAN
Problem: Safety  Goal: Will remain free from injury  Safety measures in place, bed in lowest position, side rails up x2, bed alarm on, call light in place, patient is free from injury at this time.   Outcome: PROGRESSING AS EXPECTED     Problem: Infection  Goal: Will remain free from infection  Hand hygiene observed at all times. Patient compliant with infection control practices, educated to wash hand and use hand  as needed.  Aseptic technique observed.  Patient is free of infection at this time.   Outcome: PROGRESSING AS EXPECTED     Problem: Knowledge Deficit  Goal: Knowledge of disease process/condition, treatment plan, diagnostic tests, and medications will improve  Patient demonstrates appropriate knowledge of disease process/condition, treatment plan, diagnostic tests, and medications.Patient is compliant and asks appropriate questions about disease process and POC.   Outcome: PROGRESSING AS EXPECTED

## 2021-03-19 NOTE — ASSESSMENT & PLAN NOTE
Patient was found to be tachypneic, significant respiratory distress requiring 5L supplemental O2 use on admission.  Likely due to volume overload.  -Continue with supplemental O2 and wean as tolerated  -Continuous O2 monitoring  -Continue with dialysis per nephrology  -Patient will be transferred to ICU

## 2021-03-19 NOTE — ASSESSMENT & PLAN NOTE
-Continue home amlodipine and Coreg  -Start as needed labetalol  -Continue with dialysis per nephrology  -Adjust as needed

## 2021-03-19 NOTE — ANESTHESIA TIME REPORT
Anesthesia Start and Stop Event Times     Date Time Event    3/19/2021 1336 Ready for Procedure     1416 Anesthesia Start     1456 Anesthesia Stop        Responsible Staff  03/19/21    Name Role Begin End    Nickie Brown M.D. Anesth 1416 1450        Preop Diagnosis (Free Text):  Pre-op Diagnosis     symptomatic anemia        Preop Diagnosis (Codes):    Post op Diagnosis  GI bleed      Premium Reason  Non-Premium    Comments:

## 2021-03-19 NOTE — ASSESSMENT & PLAN NOTE
Patient does have a history of chronic anemia, she has acute worsening due to acute blood loss, coffee-ground emesis in the morning on 3/19.  -Continue to monitor H&H  -See above problem list for GI bleed

## 2021-03-19 NOTE — ANESTHESIA POSTPROCEDURE EVALUATION
Patient: Lily Nicole    Procedure Summary     Date: 03/19/21 Room / Location:  ENDOSCOPIC ULTRASOUND ROOM / SURGERY [facility]    Anesthesia Start: 1416 Anesthesia Stop: 1456    Procedures:       GASTROSCOPY (N/A Abdomen)      GASTROSCOPY, WITH ARGON PLASMA COAGULATION Diagnosis: (Nodular gastritis with AVM)    Surgeons: Waylon Mcqueen M.D. Responsible Provider: Nickie Brown M.D.    Anesthesia Type: MAC ASA Status: 3          Final Anesthesia Type: MAC  Last vitals  BP   Blood Pressure: (!) 162/71, NIBP: 158/113    Temp   36.3 °C (97.3 °F)    Pulse   (!) 104   Resp   18    SpO2   100 %      Anesthesia Post Evaluation    Patient location during evaluation: PACU  Patient participation: complete - patient participated  Level of consciousness: awake and alert  Pain score: 0    Airway patency: patent  Anesthetic complications: no  Cardiovascular status: hemodynamically stable  Respiratory status: acceptable  Hydration status: euvolemic    PONV: none          No complications documented.     Nurse Pain Score: 0 (NPRS)

## 2021-03-19 NOTE — OR SURGEON
Immediate Post OP Note    PreOp Diagnosis: Nausea and vomiting, 'coffee grounds emesis' , acid reflux, anemia     PostOp Diagnosis: esophagitis, gastritis, angiodysplasia of the stomach, Karen Keyes Tear     Procedure(s):  GASTROSCOPY - Wound Class: None  GASTROSCOPY, WITH ARGON PLASMA COAGULATION    Surgeon(s):  Waylon Mcqueen M.D.    Anesthesiologist/Type of Anesthesia:  Anesthesiologist: Nickie Brown M.D./* No anesthesia type entered *    Surgical Staff:  Circulator: Renan Mcbride R.N.  Endoscopy Technician: Veronica Padgett; Shae Owusu    Specimens removed if any:  * No specimens in log *    Estimated Blood Loss: < 100 cc    Findings: Oozing from several small, puncate lesions in the stomach (predominatly antral but a few in the proximal stomach), in the setting of diffuse nodular gatsritis.  Karen Keyes Tear with adherent hemoclip (placed about ten days ago) noted just distal to the GE junction (no bleeding from this area).  Some retained food in the dependent portion of the stomach and the duodenum.  Duodenum unremarkable.  Intermittent bile flowing per ampulla.      Argon Plasma Coagulation (APC) utilized to ablate the multiple small, presumed AVMs in the antrum and cardia of the stomach.     Complications: no immediate    I suspect this will be an on going problem and Lily should have close monitoring of hemoglobin with transfusions as needed. Possible repeat EGD with assessment and possible repeat APC laterl.      Continue PPI therapy for now, sucralfate qid x two weeks, no nsaids.  Follow up with primary GI, Dr Kam as planned.  Low residue, renal diet.  Multiple (5-6) small meals per day recommended.    GI to sign off.  Please call with questions        3/19/2021 2:47 PM Waylon Mcqueen M.D.

## 2021-03-19 NOTE — ASSESSMENT & PLAN NOTE
-- Consider etiology to be multifactorial related to ESRD vs anemia with chronic disease vs ?upper GI bleed. Recent EGD showed nida thao tear.   -- Received 3 units PRBC and repeat H/H 9.2.    -- Remains hemodynamically stable with SBP ~160s and HR 110s    -- Shock index 0.68 -> normal    -- Recommend avoiding additional blood transfusion due to risk of transfusion related adverse reaction   -- Pending EGD by GI    -- Cont protonix BID    -- Goal Hb >7

## 2021-03-19 NOTE — ASSESSMENT & PLAN NOTE
-Chronic secondary to underlying conditions, lupus and ESRD, with acute worsening of her hemoglobin  -Follow-up on labs

## 2021-03-19 NOTE — PROGRESS NOTES
Report received from MICHELLE Pineda and care of patient assumed. Pt resting comfortably in bed with blood infusing through PIV without any signs of distress. A&Ox4, VSS, no complaints at this time. POC reviewed and white board updated, Tele box on, Call light within reach, Bed locked in lowest position and side rails up x2. Will monitor.

## 2021-03-19 NOTE — PROGRESS NOTES
Telemetry Shift Summary     Rhythm Sinus Tachycardia  HR Range 101-103  Ectopy  None  Measurements .16/.08/.36              Normal Values  Rhythm SR  HR Range    Measurements 0.12-0.20 / 0.06-0.10  / 0.30-0.52

## 2021-03-19 NOTE — ASSESSMENT & PLAN NOTE
-- Review of CXR showed mild cephalization suggestive of kerley B lines suggestive of pulmonary edema    -- Currently on 2 liters with SpO2 100%    -- Recommend HD with volume removal and avoid additional blood transfusion    -- Recommend titrating down supplemental oxygen to avoid risk of hyperoxia induced atelectasis and O2 toxicity   -- Goal SpO2 > 88%

## 2021-03-20 VITALS
OXYGEN SATURATION: 100 % | SYSTOLIC BLOOD PRESSURE: 142 MMHG | RESPIRATION RATE: 18 BRPM | DIASTOLIC BLOOD PRESSURE: 97 MMHG | WEIGHT: 132.28 LBS | BODY MASS INDEX: 20.76 KG/M2 | HEIGHT: 67 IN | TEMPERATURE: 97.8 F | HEART RATE: 77 BPM

## 2021-03-20 PROBLEM — J96.11 CHRONIC HYPOXEMIC RESPIRATORY FAILURE (HCC): Status: ACTIVE | Noted: 2021-03-20

## 2021-03-20 PROBLEM — J96.01 ACUTE HYPOXEMIC RESPIRATORY FAILURE (HCC): Status: RESOLVED | Noted: 2021-03-19 | Resolved: 2021-03-20

## 2021-03-20 LAB
ANION GAP SERPL CALC-SCNC: 7 MMOL/L (ref 7–16)
BUN SERPL-MCNC: 22 MG/DL (ref 8–22)
CALCIUM SERPL-MCNC: 8.5 MG/DL (ref 8.4–10.2)
CHLORIDE SERPL-SCNC: 97 MMOL/L (ref 96–112)
CO2 SERPL-SCNC: 29 MMOL/L (ref 20–33)
CREAT SERPL-MCNC: 3.82 MG/DL (ref 0.5–1.4)
ERYTHROCYTE [DISTWIDTH] IN BLOOD BY AUTOMATED COUNT: 70.9 FL (ref 35.9–50)
GLUCOSE SERPL-MCNC: 89 MG/DL (ref 65–99)
HCT VFR BLD AUTO: 24.8 % (ref 37–47)
HCT VFR BLD AUTO: 25 % (ref 37–47)
HGB BLD-MCNC: 7 G/DL (ref 12–16)
HGB BLD-MCNC: 7.3 G/DL (ref 12–16)
MAGNESIUM SERPL-MCNC: 1.6 MG/DL (ref 1.5–2.5)
MCH RBC QN AUTO: 25.4 PG (ref 27–33)
MCHC RBC AUTO-ENTMCNC: 29.2 G/DL (ref 33.6–35)
MCV RBC AUTO: 87.1 FL (ref 81.4–97.8)
PLATELET # BLD AUTO: 39 K/UL (ref 164–446)
POTASSIUM SERPL-SCNC: 4.9 MMOL/L (ref 3.6–5.5)
RBC # BLD AUTO: 2.87 M/UL (ref 4.2–5.4)
SODIUM SERPL-SCNC: 133 MMOL/L (ref 135–145)
WBC # BLD AUTO: 4.3 K/UL (ref 4.8–10.8)

## 2021-03-20 PROCEDURE — 700105 HCHG RX REV CODE 258: Performed by: INTERNAL MEDICINE

## 2021-03-20 PROCEDURE — A9270 NON-COVERED ITEM OR SERVICE: HCPCS | Performed by: INTERNAL MEDICINE

## 2021-03-20 PROCEDURE — 700102 HCHG RX REV CODE 250 W/ 637 OVERRIDE(OP): Performed by: INTERNAL MEDICINE

## 2021-03-20 PROCEDURE — 36430 TRANSFUSION BLD/BLD COMPNT: CPT

## 2021-03-20 PROCEDURE — 83735 ASSAY OF MAGNESIUM: CPT

## 2021-03-20 PROCEDURE — 700111 HCHG RX REV CODE 636 W/ 250 OVERRIDE (IP): Performed by: INTERNAL MEDICINE

## 2021-03-20 PROCEDURE — 85018 HEMOGLOBIN: CPT

## 2021-03-20 PROCEDURE — 85027 COMPLETE CBC AUTOMATED: CPT

## 2021-03-20 PROCEDURE — C9113 INJ PANTOPRAZOLE SODIUM, VIA: HCPCS | Performed by: INTERNAL MEDICINE

## 2021-03-20 PROCEDURE — 99239 HOSP IP/OBS DSCHRG MGMT >30: CPT | Performed by: INTERNAL MEDICINE

## 2021-03-20 PROCEDURE — P9016 RBC LEUKOCYTES REDUCED: HCPCS

## 2021-03-20 PROCEDURE — 80048 BASIC METABOLIC PNL TOTAL CA: CPT

## 2021-03-20 PROCEDURE — 99232 SBSQ HOSP IP/OBS MODERATE 35: CPT | Performed by: INTERNAL MEDICINE

## 2021-03-20 PROCEDURE — 85014 HEMATOCRIT: CPT

## 2021-03-20 RX ORDER — SODIUM CHLORIDE 9 MG/ML
INJECTION, SOLUTION INTRAVENOUS CONTINUOUS
Status: DISCONTINUED | OUTPATIENT
Start: 2021-03-20 | End: 2021-03-20 | Stop reason: HOSPADM

## 2021-03-20 RX ORDER — DIPHENHYDRAMINE HCL 25 MG
25 TABLET ORAL EVERY 6 HOURS PRN
Status: DISCONTINUED | OUTPATIENT
Start: 2021-03-20 | End: 2021-03-20 | Stop reason: HOSPADM

## 2021-03-20 RX ORDER — HYDROMORPHONE HYDROCHLORIDE 1 MG/ML
0.5 INJECTION, SOLUTION INTRAMUSCULAR; INTRAVENOUS; SUBCUTANEOUS ONCE
Status: DISCONTINUED | OUTPATIENT
Start: 2021-03-20 | End: 2021-03-20 | Stop reason: HOSPADM

## 2021-03-20 RX ORDER — ACETAMINOPHEN 325 MG/1
650 TABLET ORAL EVERY 4 HOURS PRN
Status: DISCONTINUED | OUTPATIENT
Start: 2021-03-20 | End: 2021-03-20 | Stop reason: HOSPADM

## 2021-03-20 RX ORDER — PANTOPRAZOLE SODIUM 40 MG/1
40 TABLET, DELAYED RELEASE ORAL 2 TIMES DAILY
Qty: 60 TABLET | Refills: 0 | Status: SHIPPED | OUTPATIENT
Start: 2021-03-20 | End: 2021-04-19

## 2021-03-20 RX ORDER — FUROSEMIDE 10 MG/ML
20 INJECTION INTRAMUSCULAR; INTRAVENOUS ONCE
Status: COMPLETED | OUTPATIENT
Start: 2021-03-20 | End: 2021-03-20

## 2021-03-20 RX ORDER — METOCLOPRAMIDE 10 MG/1
5 TABLET ORAL
Status: DISCONTINUED | OUTPATIENT
Start: 2021-03-20 | End: 2021-03-20 | Stop reason: HOSPADM

## 2021-03-20 RX ORDER — SUCRALFATE ORAL 1 G/10ML
1 SUSPENSION ORAL
Qty: 1200 ML | Refills: 1 | Status: ON HOLD | OUTPATIENT
Start: 2021-03-20 | End: 2021-08-05

## 2021-03-20 RX ADMIN — DIPHENHYDRAMINE HYDROCHLORIDE 25 MG: 50 INJECTION, SOLUTION INTRAMUSCULAR; INTRAVENOUS at 09:24

## 2021-03-20 RX ADMIN — ACETAMINOPHEN 650 MG: 325 TABLET, FILM COATED ORAL at 09:24

## 2021-03-20 RX ADMIN — FUROSEMIDE 20 MG: 10 INJECTION, SOLUTION INTRAMUSCULAR; INTRAVENOUS at 13:02

## 2021-03-20 RX ADMIN — CARVEDILOL 25 MG: 25 TABLET, FILM COATED ORAL at 07:55

## 2021-03-20 RX ADMIN — TRAMADOL HYDROCHLORIDE 50 MG: 50 TABLET, FILM COATED ORAL at 09:56

## 2021-03-20 RX ADMIN — SUCRALFATE 1 G: 1 SUSPENSION ORAL at 11:01

## 2021-03-20 RX ADMIN — PANTOPRAZOLE SODIUM 40 MG: 40 INJECTION, POWDER, LYOPHILIZED, FOR SOLUTION INTRAVENOUS at 06:14

## 2021-03-20 RX ADMIN — METOCLOPRAMIDE 5 MG: 10 TABLET ORAL at 11:01

## 2021-03-20 RX ADMIN — FUROSEMIDE 40 MG: 40 TABLET ORAL at 07:58

## 2021-03-20 RX ADMIN — ONDANSETRON 4 MG: 2 INJECTION INTRAMUSCULAR; INTRAVENOUS at 09:56

## 2021-03-20 RX ADMIN — SODIUM CHLORIDE: 9 INJECTION, SOLUTION INTRAVENOUS at 10:45

## 2021-03-20 ASSESSMENT — ENCOUNTER SYMPTOMS
CHILLS: 0
VOMITING: 0
NAUSEA: 0
FEVER: 0
SHORTNESS OF BREATH: 0
COUGH: 0

## 2021-03-20 ASSESSMENT — PAIN DESCRIPTION - PAIN TYPE
TYPE: CHRONIC PAIN
TYPE: CHRONIC PAIN

## 2021-03-20 ASSESSMENT — FIBROSIS 4 INDEX: FIB4 SCORE: 5.35

## 2021-03-20 NOTE — PROGRESS NOTES
Telemetry Shift Summary     RHYTHM: Sinus Tach  HR RANGE: 103  ECTOPY: none reported  MEASUREMENTS: 0.16/0.08/0.32     Normal Measurements  Rhythm SR  HR Range:   Measurements: 0.12-0.20/0.06-0.10/0.30-0.52     Tele strips reviewed and placed in chart.

## 2021-03-20 NOTE — PROCEDURES
DATE OF PROCEDURE:  03/19/2021     PROCEDURES PERFORMED:  Esophagogastroduodenoscopy with hemostasis (argon   plasma coagulation utilized to ablate bleeding in the stomach).     INSTRUMENT UTILIZED:  Olympus flexible forward-viewing gastroscope.     INDICATIONS:  Patient with lupus, end-stage renal disease, acid reflux, recent   Karen-Keyes tear, presents with ongoing nausea and vomiting, coffee-ground   emesis, acid reflux symptoms and worsening anemia.     CONSENT:  Full RBA discussion held prior to the procedure and signed witnessed   consent form placed on the chart.     SEDATION:  Provided by Nickie Brown MD     TOLERANCE:  Excellent.     PATHOLOGY SPECIMENS:  None.     PROCEDURAL DETAIL:  After adequate sedation, the Olympus flexible   forward-viewing gastroscope was advanced per the oral route into the   esophagus.  The esophageal mucosa was carefully inspected.  There was mild   erythema in the distal esophagus consistent with Kenedy classification   grade A esophagitis.  Immediately distal to the GE junction, there was noted   to be adherent Hemoclip at the site of prior Karen-Keyes tear.  The tear was   near completely healed.  There was some oozing from the proximal stomach   (cardia) in multiple locations from small punctate red lesions, which are   presumed to be a small angiodysplasia.  The instrument was advanced into the   body of the stomach. There was retained food and fluid within the dependent   portions of the stomach.  The fluid component was suctioned and removed to the   best of our ability.  In the antrum, there was nodularity and streaky   erythema.  There were multiple punctate red areas, which were oozing.  The   instrument was passed through the patent pyloric channel into the duodenum.    The first, second and third portions of the duodenum were essentially   unremarkable.  There was intermittent free flow of clear to yellow bile per   the ampulla.  Instrument was pulled  back into the stomach.  The antrum was   washed and inspected closely.  Next, utilizing argon plasma coagulation set   for the stomach, we used a straight fire probe to ablate all of the visualized   oozing small red spots.  These areas are presumed to be small arteriovenous   malformations versus diffusely oozing nodular gastritis.     At the conclusion, the stomach was decompressed via suction.     No immediate complications.     IMPRESSIONS AND FINDINGS:  1.  Prior Karen-Keyes tear, nearly completely healed.  2.  Mild esophagitis.  3.  Nodular gastritis with oozing.  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  4.  Long-term, she may require close monitoring of hemoglobin with   transfusions as needed.  We could consider potentially repeating EGD later for   evaluation and potentially further hemostasis/argon plasma coagulation.     PROBLEMS:  1.  Esophagitis.  2.  Karen-Keyes tear.  3.  Nodular gastritis.  4.  Angiodysplasia of the stomach.  5.  Anemia.  6.  Chronic intermittent nausea and vomiting.     PLAN AND RECOMMENDATIONS:  1.  Observe for any adverse events from this procedure.  2.  Resume a soft renal diet.  3.  Proton pump inhibitor therapy daily.  4.  Sucralfate solution four times daily for the next two weeks.  5.  Avoid nonsteroidal anti-inflammatory-type medications as you are.  6.  Close monitoring of hemoglobin with transfusions as needed.  7.  The patient to follow up with her primary gastroenterologist, Dr. Kam, as previously scheduled.     Gastroenterology to sign off now.  Please call with questions.        ______________________________  MD AMBER CROOKS/JORGITO/KAMRYN    DD:  03/19/2021 15:02  DT:  03/19/2021 16:07    Job#:  [id number]    CC:MD GILMAR Henriquez MD Chandra M. Becka, MD AGNES T. MELTON, MD

## 2021-03-20 NOTE — CONSULTS
DATE OF SERVICE:  03/19/2021     GASTROENTEROLOGY CONSULTATION     CONSULTATION REQUESTED BY: Gabby Coy MD     REASON FOR CONSULTATION:  Fatigue and shortness of breath in the setting of   anemia.  The patient has a pertinent past medical history of lupus and   end-stage renal disease, receiving hemodialysis three times weekly.  She was   recently seen in consultation for hematemesis and similar presentation by my   colleagues and underwent EGD on 03/05 by Dr. Silva.  Per review of the   procedure note, she had a small Karen-Keyes tear just distal to the   gastroesophageal junction, epinephrine was injected and a Hemoclip was placed.    She apparently has had coffee-ground emesis here this morning during   hemodialysis.  The patient has nephritis leading to her end-stage renal   disease.  She has been on long-term low dose prednisone and is currently   listed for transplantation at [facility].  She in fact had   multiple episodes for similar presentation.  She typically does well for a few   months and then has another episode with some small volume bleeding.  She   does take proton pump inhibitor therapy daily.  She avoids nonsteroidal   anti-inflammatory type medications.  Her baseline hemoglobin does run low,   around 7-8 typically.  Upon presentation here, her hemoglobin was 6.4.  She   was contacted for that hemoglobin by her outpatient doctors and told to   present if symptoms worsen.  Early this morning, she presented with increasing   fatigue.  Her hemoglobin at 2:00 a.m. this morning was 5.8.  She received 2   units of packed red blood cells and her hemoglobin anton robustly to 9.2 post   dialysis today.  Otherwise, she denies dysphagia.  She does get heartburn.     ALLERGIES:  1.  CEPHALEXIN.  2.  CLINDAMYCIN.  3.  METHYLPREDNISOLONE.  4.  METOPROLOL.     MEDICATIONS:  Her home medications include Zofran, Prilosec 40 mg orally   daily, Reglan 10 mg orally three times per day, Ultram as  needed for pain,   carvedilol, amlodipine, sucralfate solution, furosemide, prednisone 5 mg   daily, mycophenolate acetaminophen, hydroxychloroquine.     PAST MEDICAL HISTORY:  1.  Lupus.  2.  End-stage renal disease - see #1.  3.  GERD.  4.  Hypertension.  5.  Migraine headaches.  6.  Seizure in 2013.     PAST SURGICAL HISTORY:  1.  Arteriovenous fistula creation.  2.  Gastroscopy, multiple.  3.  Colonoscopy.     SOCIAL HISTORY:  No tobacco, alcohol or drug use or abuse.     FAMILY HISTORY:  Noncontributory, specifically no luminal GI disease, liver   disease, or pancreatic disease.     REVIEW OF SYSTEMS:  See the HPI.  Otherwise, all systems negative per CMS   criteria.     PHYSICAL EXAMINATION:  GENERAL:  Petite female, in no immediate distress, friendly, cooperative, and   appropriate.  VITAL SIGNS:  Afebrile, heart rate 116, respiratory rate 18, blood pressure   168/106, oxygen saturation % on 4 liters per nasal cannula.  HEENT:  Normocephalic and atraumatic.  Sclerae are anicteric.  Conjunctivae   pale.  Oropharynx moist and clear with a Mallampati score of 2.  NECK:  No thyroid abnormality or lymphadenopathy.  LUNGS:  Clear to auscultation bilaterally.  HEART:  Regular rate and rhythm without murmur.  ABDOMEN:  Bowel sounds present, soft, tender to deep palpation in the   epigastrium.  EXTREMITIES:  No clubbing, cyanosis or edema.  SKIN:  No jaundice or spider angiomata.  NEUROLOGIC:  Grossly nonfocal.  PSYCHIATRIC:  Affect is appropriate.     IMAGIN.  CT scan of the chest, abdomen and pelvis from 2021 with contrast   showed atrophic kidneys, distended esophagus indicating possible reflux, hazy   opacities in both lungs, mild splenomegaly, ill-defined sclerosis in both   femoral heads.  2.  Chest x-ray from 2021 showed pulmonary edema and cardiomegaly.  MRI   of the hip from 2021 shows avascular necrosis involving the left femoral   head anteriorly and superiorly, mild  degenerative changes of the hips.  3.  Lower extremity ultrasound from 02/16/2021 shows no superficial or deep   venous thromboses.  4.  Echocardiogram from 02/15/2021 shows left ventricular ejection fraction   55%.     IMPRESSION:  1.  Nausea and vomiting.  2.  Hematemesis.  3.  Anemia with component of acute gastrointestinal blood loss.  4.  End-stage renal disease.  5.  Lupus.  6.  History of Karen-Keyes tear.  7.  Hypertension.     PLAN AND RECOMMENDATIONS:  1.  N.p.o. for the time being.  2. EGD urgently with anesthesiologist assistance secondary to ASA 3 and high   risk.  3.  Transfusions as needed as you are.  4.  Intravenous pantoprazole 40 mg IV b.i.d. as you are.  5.  Will defer to the hospitalist for management of the patient's other   multiple comorbid conditions.  Further recommendations to follow diagnostic   and potentially therapeutic EGD.  Full RVA discussion held with the patient.    She wishes to move forward.        ______________________________  MD AMBER CROOKS/GENOVEVA/DOMINIQUE    DD:  03/19/2021 13:58  DT:  03/19/2021 16:55    Job#:  [id number]    CC:MD RICHARD Omalley MD TIMOTHY E. HALTERMAN, MD Maria T. Gorgona, MD JANINA NYLK, MD

## 2021-03-20 NOTE — DISCHARGE INSTRUCTIONS
Discharge Instructions    Discharged to home by car with relative. Discharged via wheelchair, hospital escort: Yes.  Special equipment needed: Oxygen    Be sure to schedule a follow-up appointment with your primary care doctor or any specialists as instructed.     Discharge Plan:   Influenza Vaccine Indication: Not indicated: Previously immunized this influenza season and > 8 years of age    I understand that a diet low in cholesterol, fat, and sodium is recommended for good health. Unless I have been given specific instructions below for another diet, I accept this instruction as my diet prescription.   Other diet: Small frequent meals, no NSAIDS (iburofen, naproxen), avoid coffee, spicy foods.        Special Instructions: None    · Is patient discharged on Warfarin / Coumadin?   No     Depression / Suicide Risk    As [facility] are [facility], it is important to learn how to keep safe from harming yourself.    Recognize the warning signs:  · Abrupt changes in personality, positive or negative- including increase in energy   · Giving away possessions  · Change in eating patterns- significant weight changes-  positive or negative  · Change in sleeping patterns- unable to sleep or sleeping all the time   · Unwillingness or inability to communicate  · Depression  · Unusual sadness, discouragement and loneliness  · Talk of wanting to die  · Neglect of personal appearance   · Rebelliousness- reckless behavior  · Withdrawal from people/activities they love  · Confusion- inability to concentrate     If you or a loved one observes any of these behaviors or has concerns about self-harm, here's what you can do:  · Talk about it- your feelings and reasons for harming yourself  · Remove any means that you might use to hurt yourself (examples: pills, rope, extension cords, firearm)  · Get professional help from the community (Mental Health, Substance Abuse, psychological counseling)  · Do not be  alone:Call your Safe Contact- someone whom you trust who will be there for you.  · Call your local CRISIS HOTLINE 015-5157 or 644-157-4352  · Call your local Children's Mobile Crisis Response Team [place] (228) 813-7781 or www.[employer]  · Call the toll free National Suicide Prevention Hotlines   · National Suicide Prevention Lifeline 603-215-QLWP (6051)  · National "[employer] Line Network 800-SUICIDE (307-1853)

## 2021-03-20 NOTE — DISCHARGE SUMMARY
"Discharge Summary    CHIEF COMPLAINT ON ADMISSION  Chief Complaint   Patient presents with   • Shortness of Breath       Reason for Admission  Shortness of breath     Admission Date  3/19/2021    CODE STATUS  Full Code    HPI & HOSPITAL COURSE  Per notes, \"25 y.o. female who presented 3/19/2021 with shortness of breath and dizziness.  Patient does have chronic anemia, went to have her blood checked today, hemoglobin was 6.4.  They decided to attempt to get blood ready for dialysis tomorrow but told her if she became symptomatic she needed to come to the emergency department.  She did begin experiencing shortness of breath and dizziness so she presented to the emergency department.  While here, her hemoglobin has been decreasing.  She did have a bowel movement today and states it was brown with no sign of blood or melena.  She did recently have a GI bleed with Karen-Keyes tear, she also has a history of gastritis.  Patient is on Carafate and omeprazole and has been taking her meds.  Patient does have antibodies so her blood does take some time to obtain.  She also has end-stage renal disease and gets hemodialysis Monday, Wednesday and Friday.\"    On admission hemoglobin was 5.8, she was transfused 1 unit RBC.  She was found to be in acute respiratory failure requiring supplemental oxygen.  She received 1 session of hemodialysis on the morning of 3/19 with significant provement in respiratory status.  She was evaluated by critical care due to worsening symptoms, but later improved and was felt stable enough to stay on the telemetry unit. Her baseline O2 requirements are 2 L, she returned to this.  She developed coffee-ground emesis on 3/19 after admission.  GI was consulted and she was taken for EGD on 3/19 which showed oozing from several small punctuate lesions in the stomach, diffuse nodular gastritis, Karen-Keyes tear with adherent Hemoclip (no bleeding from this area), she did have argon plasma coagulation.  " "Per GI, she will need to continue sucralfate 4 times daily for 2 weeks and PPI, no NSAIDs, multiple small meals per day, renal diet.  She will have follow-up with GI Dr. Kam in 2 weeks as planned.  Due to current gastritis problem it is possible patient will need repeat transfusions and should have close monitoring of hemoglobin, if acute worsening will need repeat EGD.  I did discuss this with patient.    Patient initially did have a blood transfusion scheduled for 3/21, but I prefer to monitor. Her hemoglobin on 3/28 was 7, due to consistent bleeding she was transfused 1 more unit of RBCs on 3/20 (a total of 2 units during hospitalization).  I did instruct her to follow-up with hematology for close monitoring of hemoglobin, but she will need transfusion tomorrow.      Therefore, she is discharged in good and stable condition to home with close outpatient follow-up.    The patient met 2-midnight criteria for an inpatient stay at the time of discharge.    Discharge Date  3/20/2021    FOLLOW UP ITEMS POST DISCHARGE  Follow-up with gastroenterology in 2 weeks (Dr. Kam)  Follow-up with hematology/oncology  Follow-up with nephrology    DISCHARGE DIAGNOSES  Active Problems:    Symptomatic anemia POA: Yes    ESRD (end stage renal disease) on dialysis (HCC) POA: Yes    Lupus (HCC) POA: Yes    GIB (gastrointestinal bleeding) POA: Yes    Elevated brain natriuretic peptide (BNP) level POA: Yes    Hyponatremia POA: Yes    Pancytopenia (HCC) POA: Yes    Anemia due to blood loss, acute POA: Yes    Shortness of breath POA: Unknown    Chronic hypoxemic respiratory failure (HCC) POA: Yes    Hypertension POA: Yes      Overview: \"Controlled with medication\"  Resolved Problems:    Acute hypoxemic respiratory failure (HCC) POA: Yes      FOLLOW UP  Future Appointments   Date Time Provider Department Center   3/21/2021  9:00 AM [facility]     Ella Matthew M.D.  [address]  [place] " [id number]  [phone]            MEDICATIONS ON DISCHARGE     Medication List      CHANGE how you take these medications      Instructions   ondansetron 4 MG Tbdp  What changed: Another medication with the same name was removed. Continue taking this medication, and follow the directions you see here.  Commonly known as: ZOFRAN ODT   Take 1 tablet by mouth every four hours as needed for Nausea (give PO if no IV route available).  Dose: 4 mg     sucralfate 1 GM/10ML Susp  What changed:   · when to take this  · additional instructions  Commonly known as: CARAFATE   Doctor's comments: Can give tablets at same dosage if pt unable to afford liquid preparation, quantity sufficient for one month  Take 10 mL by mouth 4 Times a Day,Before Meals and at Bedtime.  Dose: 1 g        CONTINUE taking these medications      Instructions   acetaminophen 500 MG Tabs  Commonly known as: TYLENOL   Take 500 mg by mouth every 6 hours as needed for Moderate Pain.  Dose: 500 mg     amLODIPine 10 MG Tabs  Commonly known as: NORVASC   Take 1 tablet by mouth every evening.  Dose: 10 mg     carvedilol 25 MG Tabs  Commonly known as: COREG   Take 1 tablet by mouth 2 times a day, with meals for 30 days.  Dose: 25 mg     hydroxychloroquine 200 MG Tabs  Commonly known as: Plaquenil   Take 200 mg by mouth every evening.  Dose: 200 mg     Lasix 40 MG Tabs  Generic drug: furosemide   Take 40 mg by mouth 2 (two) times a day.  Dose: 40 mg     metoclopramide 10 MG Tabs  Commonly known as: REGLAN   Take 1 tablet by mouth 3 times a day before meals for 25 days.  Dose: 10 mg     mycophenolate 180 MG EC tablet  Commonly known as: Myfortic   Take 1 Tab by mouth every evening.  Dose: 180 mg     pantoprazole 40 MG Tbec  Commonly known as: PROTONIX   Take 1 tablet by mouth 2 times a day for 30 days.  Dose: 40 mg     predniSONE 5 MG Tabs  Commonly known as: DELTASONE   Take 5 mg by mouth every evening.  Dose: 5 mg     traMADol 50 MG Tabs  Commonly known as: ULTRAM    Take 1 tablet by mouth every 6 hours as needed for Moderate Pain for up to 14 days.  Dose: 50 mg        STOP taking these medications    omeprazole 40 MG delayed-release capsule  Commonly known as: PRILOSEC            Allergies  Allergies   Allergen Reactions   • Cephalexin Rash     .   • Clindamycin Rash     .   • Methylprednisolone Unspecified     Anxious   • Metoprolol Rash     .       DIET  Orders Placed This Encounter   Procedures   • Diet Order Diet: Low Fiber(GI Soft); Second Modifier: (optional): Renal     Standing Status:   Standing     Number of Occurrences:   1     Order Specific Question:   Diet:     Answer:   Low Fiber(GI Soft) [2]     Order Specific Question:   Second Modifier: (optional)     Answer:   Renal [8]       ACTIVITY  As tolerated.  Weight bearing as tolerated    CONSULTATIONS  Gastroenterology  Critical care    PROCEDURES  Hemodialysis    LABORATORY  Lab Results   Component Value Date    SODIUM 133 (L) 03/20/2021    POTASSIUM 4.9 03/20/2021    CHLORIDE 97 03/20/2021    CO2 29 03/20/2021    GLUCOSE 89 03/20/2021    BUN 22 03/20/2021    CREATININE 3.82 (H) 03/20/2021        Lab Results   Component Value Date    WBC 4.3 (L) 03/20/2021    HEMOGLOBIN 7.0 (L) 03/20/2021    HEMATOCRIT 24.8 (L) 03/20/2021    PLATELETCT 39 (LL) 03/20/2021        Total time of the discharge process exceeds 40 minutes.

## 2021-03-20 NOTE — PROGRESS NOTES
Nephrology Daily Progress Note    Date of Service  3/20/2021    Chief Complaint  25 y.o. female admitted 3/19/2021 with ESRD, GI bleed    Interval Problem Update  Pt had EGD , report noted  Feels well today    Review of Systems  Review of Systems   Constitutional: Negative for chills, fever and malaise/fatigue.   Respiratory: Negative for cough and shortness of breath.    Cardiovascular: Negative for chest pain and leg swelling.   Gastrointestinal: Negative for nausea and vomiting.   Genitourinary: Negative for dysuria, frequency and urgency.        Physical Exam  Temp:  [36.3 °C (97.3 °F)-37.3 °C (99.1 °F)] 36.6 °C (97.8 °F)  Pulse:  [] 77  Resp:  [16-20] 18  BP: (129-191)/() 142/97  SpO2:  [95 %-100 %] 100 %    Physical Exam  Vitals and nursing note reviewed.   Constitutional:       General: She is not in acute distress.     Appearance: Normal appearance. She is well-developed. She is not diaphoretic.   HENT:      Head: Normocephalic and atraumatic.      Right Ear: External ear normal.      Left Ear: External ear normal.      Nose: Nose normal.   Eyes:      General: No scleral icterus.        Right eye: No discharge.         Left eye: No discharge.      Conjunctiva/sclera: Conjunctivae normal.   Cardiovascular:      Rate and Rhythm: Normal rate and regular rhythm.      Heart sounds: No murmur.   Pulmonary:      Effort: Pulmonary effort is normal. No respiratory distress.      Breath sounds: Normal breath sounds.   Musculoskeletal:         General: No tenderness.      Right lower leg: No edema.      Left lower leg: No edema.   Skin:     General: Skin is warm and dry.      Findings: No erythema.   Neurological:      General: No focal deficit present.      Mental Status: She is alert and oriented to person, place, and time.      Cranial Nerves: No cranial nerve deficit.   Psychiatric:         Mood and Affect: Mood normal.         Behavior: Behavior normal.         Fluids    Intake/Output Summary (Last 24  hours) at 3/20/2021 1318  Last data filed at 3/20/2021 1300  Gross per 24 hour   Intake 480 ml   Output --   Net 480 ml       Laboratory  Recent Labs     03/18/21  1130 03/18/21  1130 03/19/21  0210 03/19/21  1232 03/19/21  1802 03/20/21  0015 03/20/21  0733   WBC 2.6*  --  3.4*  --   --  4.3*  --    RBC 2.44*  --  2.22*  --   --  2.87*  --    HEMOGLOBIN 6.4*   < > 5.8*   < > 7.9* 7.3* 7.0*   HEMATOCRIT 22.3*   < > 20.2*   < > 27.0* 25.0* 24.8*   MCV 91.4  --  91.0  --   --  87.1  --    MCH 26.2*  --  26.1*  --   --  25.4*  --    MCHC 28.7*  --  28.7*  --   --  29.2*  --    RDW 66.5*  --  66.0*  --   --  70.9*  --    PLATELETCT 48*  --  59*  --   --  39*  --    MPV  --   --  13.7*  --   --   --   --     < > = values in this interval not displayed.     Recent Labs     03/17/21 2151 03/17/21  2151 03/19/21  0210 03/19/21  1353 03/20/21  0015   SODIUM 132*  --  131*  --  133*   POTASSIUM 3.8   < > 3.7 4.0 4.9   CHLORIDE 92*  --  93*  --  97   CO2 33  --  28  --  29   GLUCOSE 100*  --  91  --  89   BUN 17  --  32*  --  22   CREATININE 3.49*  --  5.47*  --  3.82*   CALCIUM 8.5  --  8.6  --  8.5    < > = values in this interval not displayed.         Recent Labs     03/17/21 2151   NTPROBNP >01939*           Imaging  No orders to display         Assessment/Plan  1 ESRD  2 GI bleed  3 HTN  no need for HD today  Renal diet  Daily labs  Renal dose all meds  Avoid nephrotoxins  OK to D/C from renal point  D/W Dr Pierre

## 2021-03-20 NOTE — PROGRESS NOTES
1930 patient states shes nauseated, will give IV zofran. Patient steady with standby assist to the bathroom. Patient runs tachy on the monitor when ambulating. Encouraged patient to call for any needs. CNA with patient in bathroom for safety    2140 patient complaint of nausea and pain. States the zofran given DID help her, but not enough to take the tramadol. Wasted, request IV medication for pain, IV Morphine 2mg 1x dose ordered.     2245 patient has been asleep since order for IV pain medication was placed. Woke patient to ask her pain level, states is an 8/10 mainly in her low back and low abdomen. Educated patient on this medication and to call before getting up. Will recheck pain level.     0100 patient platelet critical, dr smith aware. No new orders. Patient resting    0400 patient resting comfortably, no needs at this time    0600 patient too nauseated for pills. IV medications given as ordered. Patient BP elevated but not within PRN parameters.

## 2021-03-20 NOTE — CONSULTS
DATE OF SERVICE:  03/19/2021     REQUESTING PHYSICIAN:  Jorge Styles DO from the Hospitalist Service.     REASON FOR CONSULTATION:  Management of end-stage renal disease, assess the   need for dialysis.     HISTORY OF PRESENT ILLNESS:  The patient is an unfortunate 25-year-old lady   with a past medical history significant for end-stage renal disease, undergoes   hemodialysis on a Monday, Wednesday, Friday; history of chronic anemia,   presented to the hospital earlier this morning with shortness of breath, was   attributed to severe anemia.  The patient has been admitted.  We were called   to manage her kidney disease, assess the need for dialysis.     The patient has no cough, no hemoptysis, no hematuria, no dysuria.     PAST MEDICAL HISTORY:  Significant for:  1.  End-stage renal disease.  2.  Anemia.     ALLERGIES:  The list was reviewed.     SOCIAL HISTORY:  The patient has no smoking or alcohol use.     FAMILY HISTORY:  Positive for diabetes.     MEDICATIONS:  Reviewed.     REVIEW OF SYSTEMS:  The patient has shortness of breath, occasional nausea.    All other review of systems negative except outlined in the history of present   illness.     PHYSICAL EXAMINATION:  GENERAL:  The patient is in no apparent distress.  VITAL SIGNS:  Showed blood pressure of 147/92, heart rate was 114, respiratory   rate was 20.  HEENT:  Normocephalic, atraumatic.  Sclerae are anicteric.  Pupils are   reactive.  Nose normal.  Mucous membranes moist.  NECK:  No lymphadenopathy, no JVD, no thyroid mass.  CHEST:  Normal.  LUNGS:  Clear to auscultation.  HEART:  S1, S2.  ABDOMEN:  Soft, nontender.  No hepatosplenomegaly.  There is no inguinal   lymphadenopathy.  EXTREMITIES:  There is trace lower extremity edema.  SKIN:  No skin rash.  NEUROLOGIC:  The patient is alert and oriented x3.     LABORATORY DATA:  Her recent labs were reviewed.     DIAGNOSTIC DATA:  She also had chest x-ray done yesterday, I reviewed the   image myself,  which showed pulmonary edema.     ASSESSMENT:  1.  End-stage renal disease.  2.  Respiratory failure.  3.  Severe anemia.  4.  Uncontrolled hypertension.     PLAN:  1.  We will plan urgent dialysis to manage her respiratory failure, especially   if the patient is getting blood transfusion.  2.  Erythropoietin with dialysis.  3.  Rule out GI bleed.  4.  Renal dose all medications.  5.  Avoid nephrotoxin.  6.  Prognosis is guarded.        ______________________________  FADI NAJJAR, MD FN/LIDIA/AYANNA    DD:  03/19/2021 16:20  DT:  03/19/2021 18:37    Job#:  [id number]

## 2021-03-20 NOTE — PROGRESS NOTES
~0700: report received from Haylie MARTINEZ. Patient is sitting up in bed. Alert and oriented x4. Denies pain medication at this time. Currently on 2L NC. Per patient, this is her baseline. Patient has been refusing sucralfate as her outpatient GI doctor told her to stop taking. Will notify MD Pierre. Per patient, last BM was after EGD and was soft and brown.    ~0900: MD Pierre at bedside speaking with patient. Last hemoglobin resulted 7. Per MD, patient will have 1u PRBCs and then be discharged. Patient agreeable to plan of care. MD Pierre spoke to patient on importance of sucralfate as she is actively bleeding in her stomach. Patient agreeable to taking medication now. Will give as ordered prior to lunch. Premeds to be ordered with blood transfusion. Verified consent in chart.     ~1035: 1u PRBC infusing. Double sign off with Mirella MARTINEZ. Blood would not scan. Manually inputted information. Mirella notified Reshma RAMOS, and sent picture of blood to Manager, Tessa. Same issue occurred yesterday. Blood double checked. Infused as ordered. Will monitor.     ~1050: RACHEL Justice, on unit. Discussed issue with NAM. Downtime sheet not needed as RNs manually inputted information into computer    ~1230: MICHELLE Pierre notified as patient is complaining of 8 out of 10 abdominal pain. 0.5mg dilaudid ordered per MD.     ~1250: patient stated her abdominal pain is better wtihout dilaudid administration. Patient would like to hold off on dilaudid. Blood transfusion just about finished. Lasix will be given and patient will be discharged.     ~1315: PIV removed. All belongings with patient. Patients mom at bedside and taking patient home. Patient will continue 2L NC at home as this is her baseline. Discharge paperwork reviewed. All questions addressed. Per patient, she was already vaccinated for flu this season despite not having record of in in Epic. Does not want pneumonia vaccine. Patient discharged.

## 2021-03-21 ENCOUNTER — OUTPATIENT INFUSION SERVICES (OUTPATIENT)
Dept: ONCOLOGY | Facility: MEDICAL CENTER | Age: 26
End: 2021-03-21
Attending: INTERNAL MEDICINE
Payer: COMMERCIAL

## 2021-03-21 VITALS
DIASTOLIC BLOOD PRESSURE: 107 MMHG | TEMPERATURE: 97.5 F | BODY MASS INDEX: 20.09 KG/M2 | HEART RATE: 92 BPM | HEIGHT: 66 IN | SYSTOLIC BLOOD PRESSURE: 156 MMHG | RESPIRATION RATE: 18 BRPM | OXYGEN SATURATION: 95 % | WEIGHT: 125 LBS

## 2021-03-21 DIAGNOSIS — D64.9 SYMPTOMATIC ANEMIA: ICD-10-CM

## 2021-03-21 LAB
ABO GROUP BLD: NORMAL
BARCODED ABORH UBTYP: 5100
BARCODED PRD CODE UBPRD: NORMAL
BARCODED UNIT NUM UBUNT: NORMAL
BASOPHILS # BLD AUTO: 0.4 % (ref 0–1.8)
BASOPHILS # BLD: 0.02 K/UL (ref 0–0.12)
BLD GP AB SCN SERPL QL: NORMAL
COMPONENT R 8504R: NORMAL
EOSINOPHIL # BLD AUTO: 0.14 K/UL (ref 0–0.51)
EOSINOPHIL NFR BLD: 3.1 % (ref 0–6.9)
ERYTHROCYTE [DISTWIDTH] IN BLOOD BY AUTOMATED COUNT: 64.6 FL (ref 35.9–50)
HCT VFR BLD AUTO: 26.7 % (ref 37–47)
HCT VFR BLD AUTO: 33.2 % (ref 37–47)
HGB BLD-MCNC: 8.2 G/DL (ref 12–16)
HGB BLD-MCNC: 9.7 G/DL (ref 12–16)
IMM GRANULOCYTES # BLD AUTO: 0.01 K/UL (ref 0–0.11)
IMM GRANULOCYTES NFR BLD AUTO: 0.2 % (ref 0–0.9)
LYMPHOCYTES # BLD AUTO: 0.75 K/UL (ref 1–4.8)
LYMPHOCYTES NFR BLD: 16.7 % (ref 22–41)
MCH RBC QN AUTO: 25.5 PG (ref 27–33)
MCHC RBC AUTO-ENTMCNC: 29.2 G/DL (ref 33.6–35)
MCV RBC AUTO: 87.4 FL (ref 81.4–97.8)
MONOCYTES # BLD AUTO: 0.19 K/UL (ref 0–0.85)
MONOCYTES NFR BLD AUTO: 4.2 % (ref 0–13.4)
NEUTROPHILS # BLD AUTO: 3.37 K/UL (ref 2–7.15)
NEUTROPHILS NFR BLD: 75.4 % (ref 44–72)
NRBC # BLD AUTO: 0 K/UL
NRBC BLD-RTO: 0 /100 WBC
PLATELET # BLD AUTO: 22 K/UL (ref 164–446)
PRODUCT TYPE UPROD: NORMAL
RBC # BLD AUTO: 3.8 M/UL (ref 4.2–5.4)
RH BLD: NORMAL
UNIT STATUS USTAT: NORMAL
WBC # BLD AUTO: 4.5 K/UL (ref 4.8–10.8)

## 2021-03-21 PROCEDURE — 306780 HCHG STAT FOR TRANSFUSION PER CASE

## 2021-03-21 PROCEDURE — 85014 HEMATOCRIT: CPT

## 2021-03-21 PROCEDURE — 36430 TRANSFUSION BLD/BLD COMPNT: CPT

## 2021-03-21 PROCEDURE — 36415 COLL VENOUS BLD VENIPUNCTURE: CPT

## 2021-03-21 PROCEDURE — 700102 HCHG RX REV CODE 250 W/ 637 OVERRIDE(OP): Performed by: NURSE PRACTITIONER

## 2021-03-21 PROCEDURE — 86922 COMPATIBILITY TEST ANTIGLOB: CPT

## 2021-03-21 PROCEDURE — 85025 COMPLETE CBC W/AUTO DIFF WBC: CPT

## 2021-03-21 PROCEDURE — 86900 BLOOD TYPING SEROLOGIC ABO: CPT

## 2021-03-21 PROCEDURE — 86945 BLOOD PRODUCT/IRRADIATION: CPT

## 2021-03-21 PROCEDURE — 700105 HCHG RX REV CODE 258: Performed by: INTERNAL MEDICINE

## 2021-03-21 PROCEDURE — P9016 RBC LEUKOCYTES REDUCED: HCPCS

## 2021-03-21 PROCEDURE — 700111 HCHG RX REV CODE 636 W/ 250 OVERRIDE (IP): Performed by: INTERNAL MEDICINE

## 2021-03-21 PROCEDURE — 85018 HEMOGLOBIN: CPT

## 2021-03-21 PROCEDURE — 96374 THER/PROPH/DIAG INJ IV PUSH: CPT

## 2021-03-21 PROCEDURE — A9270 NON-COVERED ITEM OR SERVICE: HCPCS | Performed by: NURSE PRACTITIONER

## 2021-03-21 PROCEDURE — 86880 COOMBS TEST DIRECT: CPT

## 2021-03-21 PROCEDURE — 86850 RBC ANTIBODY SCREEN: CPT

## 2021-03-21 PROCEDURE — 86901 BLOOD TYPING SEROLOGIC RH(D): CPT | Mod: 91

## 2021-03-21 RX ORDER — HEPARIN SODIUM (PORCINE) LOCK FLUSH IV SOLN 100 UNIT/ML 100 UNIT/ML
500 SOLUTION INTRAVENOUS PRN
Status: CANCELLED | OUTPATIENT
Start: 2021-03-21

## 2021-03-21 RX ORDER — 0.9 % SODIUM CHLORIDE 0.9 %
10 VIAL (ML) INJECTION PRN
Status: CANCELLED | OUTPATIENT
Start: 2021-03-21

## 2021-03-21 RX ORDER — ACETAMINOPHEN 325 MG/1
650 TABLET ORAL ONCE
Status: COMPLETED | OUTPATIENT
Start: 2021-03-21 | End: 2021-03-21

## 2021-03-21 RX ORDER — SODIUM CHLORIDE 9 MG/ML
10 INJECTION, SOLUTION INTRAMUSCULAR; INTRAVENOUS; SUBCUTANEOUS PRN
Status: CANCELLED | OUTPATIENT
Start: 2021-03-21

## 2021-03-21 RX ORDER — DIPHENHYDRAMINE HYDROCHLORIDE 50 MG/ML
25 INJECTION INTRAMUSCULAR; INTRAVENOUS PRN
Status: COMPLETED | OUTPATIENT
Start: 2021-03-21 | End: 2021-03-21

## 2021-03-21 RX ORDER — ACETAMINOPHEN 325 MG/1
650 TABLET ORAL ONCE
Status: CANCELLED | OUTPATIENT
Start: 2021-03-21 | End: 2021-03-21

## 2021-03-21 RX ORDER — SODIUM CHLORIDE 9 MG/ML
INJECTION, SOLUTION INTRAVENOUS CONTINUOUS
Status: CANCELLED | OUTPATIENT
Start: 2021-03-21

## 2021-03-21 RX ORDER — ACETAMINOPHEN 325 MG/1
650 TABLET ORAL ONCE
Status: DISCONTINUED | OUTPATIENT
Start: 2021-03-21 | End: 2021-03-21 | Stop reason: HOSPADM

## 2021-03-21 RX ORDER — ACETAMINOPHEN 325 MG/1
650 TABLET ORAL PRN
Status: CANCELLED | OUTPATIENT
Start: 2021-03-21

## 2021-03-21 RX ORDER — 0.9 % SODIUM CHLORIDE 0.9 %
3 VIAL (ML) INJECTION PRN
Status: CANCELLED | OUTPATIENT
Start: 2021-03-21

## 2021-03-21 RX ORDER — 0.9 % SODIUM CHLORIDE 0.9 %
VIAL (ML) INJECTION PRN
Status: CANCELLED | OUTPATIENT
Start: 2021-03-21

## 2021-03-21 RX ORDER — DIPHENHYDRAMINE HCL 25 MG
25 TABLET ORAL ONCE
Status: COMPLETED | OUTPATIENT
Start: 2021-03-21 | End: 2021-03-21

## 2021-03-21 RX ORDER — SODIUM CHLORIDE 9 MG/ML
500 INJECTION, SOLUTION INTRAVENOUS ONCE
Status: CANCELLED | OUTPATIENT
Start: 2021-03-21 | End: 2021-03-21

## 2021-03-21 RX ORDER — DIPHENHYDRAMINE HYDROCHLORIDE 50 MG/ML
25 INJECTION INTRAMUSCULAR; INTRAVENOUS PRN
Status: CANCELLED | OUTPATIENT
Start: 2021-03-21

## 2021-03-21 RX ORDER — DIPHENHYDRAMINE HCL 25 MG
25 TABLET ORAL ONCE
Status: CANCELLED | OUTPATIENT
Start: 2021-03-21 | End: 2021-03-21

## 2021-03-21 RX ORDER — DIPHENHYDRAMINE HCL 25 MG
25 TABLET ORAL ONCE
Status: DISCONTINUED | OUTPATIENT
Start: 2021-03-21 | End: 2021-03-21 | Stop reason: HOSPADM

## 2021-03-21 RX ORDER — SODIUM CHLORIDE 9 MG/ML
INJECTION, SOLUTION INTRAVENOUS CONTINUOUS
Status: DISCONTINUED | OUTPATIENT
Start: 2021-03-21 | End: 2021-03-21 | Stop reason: HOSPADM

## 2021-03-21 RX ORDER — LIDOCAINE HYDROCHLORIDE 10 MG/ML
20 INJECTION, SOLUTION INFILTRATION; PERINEURAL
Status: CANCELLED | OUTPATIENT
Start: 2021-03-21

## 2021-03-21 RX ORDER — ACETAMINOPHEN 325 MG/1
650 TABLET ORAL ONCE
Status: CANCELLED | OUTPATIENT
Start: 2021-03-21

## 2021-03-21 RX ADMIN — DIPHENHYDRAMINE HYDROCHLORIDE 25 MG: 50 INJECTION INTRAMUSCULAR; INTRAVENOUS at 13:10

## 2021-03-21 RX ADMIN — ACETAMINOPHEN 650 MG: 325 TABLET ORAL at 09:28

## 2021-03-21 RX ADMIN — SODIUM CHLORIDE: 9 INJECTION, SOLUTION INTRAVENOUS at 10:30

## 2021-03-21 RX ADMIN — DIPHENHYDRAMINE HYDROCHLORIDE 25 MG: 25 TABLET ORAL at 09:28

## 2021-03-21 ASSESSMENT — FIBROSIS 4 INDEX: FIB4 SCORE: 5.35

## 2021-03-21 NOTE — PROGRESS NOTES
BP improved. Feeling good. Informs me she will take her BP medicine when she gets home. Discharged to self care, ambulatory.

## 2021-03-21 NOTE — PROGRESS NOTES
Patient to [facility] for 1 unit of PRBC's. COD was previously done but since [place] [facility] [place] the COD here was invalid. PIV inserted into left forearm flushed with + blood return, lab drawn. Premeds given. Patient on 3 LO2. Patient can only have BP's and lab pokes done on left arm. Right arm has fistula. PRBC's was ordered off the labs on 3-18-21 in a BB communication because patient has antibodies and it takes a couple days to get them in. HGB was checked on 3-20-21 and was 7.0. Based on that result one unit of RBC's was ordered. HGB redone on 3-21-21 result came back after 1 unit of PRBC's was infusing. HGB 8.2. Patient will only receive the one unit of blood. 15 min check done and rate increased to 220ml/hr. 276ml of PRBC infused and patient started to have a transfusion reaction. Blood was stopped and IV Benadryl was given. Patient had increased BP, tachy, diaphoretic and nausea. After 5 minutes the symptoms were starting to subside. After 15 minutes all symptoms were gone. UA and blood sample was taken and sent to lab for transfusion reaction testing. The unit of blood was sent back to blood bank with transfusion reaction paper. After 25 minutes patient stated that she felt fine and was ready to go home. VS returned to patients baseline. Dr. Garcia requested that patient call him tomorrow and make appointment. PIV flushed with + blood return and removed. Gauze and Coban applied as a dressing. Patient to home in care of self.

## 2021-03-22 ENCOUNTER — TELEPHONE (OUTPATIENT)
Dept: ONCOLOGY | Facility: MEDICAL CENTER | Age: 26
End: 2021-03-22

## 2021-03-22 LAB
ABO GROUP BLD: ABNORMAL
DAT C3D-SP REAG RBC QL: ABNORMAL
DAT IGG-SP REAG RBC QL: ABNORMAL
PATHOLOGIST INTERPRETATION PTRX: ABNORMAL
RH BLD: ABNORMAL
STAT TRANS INVEST 8506STI: ABNORMAL

## 2021-03-22 NOTE — TELEPHONE ENCOUNTER
"Follow up with patient, states \"feeling better\". She sounds good. Tells me she made an appointment with Dr Garcia for April 8th. Requested additional appointment for Infusion Services. Facilitated with scheduling. Patient to receive notification from [facility].  "

## 2021-03-23 LAB
BARCODED ABORH UBTYP: 5100
BARCODED PRD CODE UBPRD: NORMAL
BARCODED UNIT NUM UBUNT: NORMAL
COMPONENT R 8504R: NORMAL
PRODUCT TYPE UPROD: NORMAL
UNIT STATUS USTAT: NORMAL

## 2021-03-27 ENCOUNTER — APPOINTMENT (OUTPATIENT)
Dept: RADIOLOGY | Facility: MEDICAL CENTER | Age: 26
DRG: 189 | End: 2021-03-27
Attending: EMERGENCY MEDICINE
Payer: COMMERCIAL

## 2021-03-27 ENCOUNTER — HOSPITAL ENCOUNTER (INPATIENT)
Facility: MEDICAL CENTER | Age: 26
LOS: 2 days | DRG: 189 | End: 2021-03-30
Attending: EMERGENCY MEDICINE | Admitting: HOSPITALIST
Payer: COMMERCIAL

## 2021-03-27 DIAGNOSIS — K92.2 ACUTE UPPER GI BLEED: ICD-10-CM

## 2021-03-27 DIAGNOSIS — I16.0 HYPERTENSIVE URGENCY: ICD-10-CM

## 2021-03-27 DIAGNOSIS — D64.9 SYMPTOMATIC ANEMIA: ICD-10-CM

## 2021-03-27 DIAGNOSIS — Z99.2 END STAGE RENAL DISEASE ON DIALYSIS (HCC): ICD-10-CM

## 2021-03-27 DIAGNOSIS — D69.6 THROMBOCYTOPENIA (HCC): ICD-10-CM

## 2021-03-27 DIAGNOSIS — J81.1 CHRONIC PULMONARY EDEMA: ICD-10-CM

## 2021-03-27 DIAGNOSIS — N18.6 END STAGE RENAL DISEASE ON DIALYSIS (HCC): ICD-10-CM

## 2021-03-27 PROBLEM — R79.1 ELEVATED INR: Status: ACTIVE | Noted: 2021-03-27

## 2021-03-27 PROBLEM — D84.9 IMMUNOSUPPRESSION (HCC): Status: ACTIVE | Noted: 2021-03-27

## 2021-03-27 LAB
ALBUMIN SERPL BCP-MCNC: 2.9 G/DL (ref 3.2–4.9)
ALBUMIN/GLOB SERPL: 0.5 G/DL
ALP SERPL-CCNC: 67 U/L (ref 30–99)
ALT SERPL-CCNC: 7 U/L (ref 2–50)
ANION GAP SERPL CALC-SCNC: 5 MMOL/L (ref 7–16)
ANISOCYTOSIS BLD QL SMEAR: ABNORMAL
APTT PPP: 30.1 SEC (ref 24.7–36)
AST SERPL-CCNC: 18 U/L (ref 12–45)
BASOPHILS # BLD AUTO: 0.6 % (ref 0–1.8)
BASOPHILS # BLD: 0.04 K/UL (ref 0–0.12)
BILIRUB SERPL-MCNC: 0.6 MG/DL (ref 0.1–1.5)
BUN SERPL-MCNC: 19 MG/DL (ref 8–22)
CALCIUM SERPL-MCNC: 9.1 MG/DL (ref 8.4–10.2)
CHLORIDE SERPL-SCNC: 90 MMOL/L (ref 96–112)
CO2 SERPL-SCNC: 34 MMOL/L (ref 20–33)
COMMENT 1642: NORMAL
CREAT SERPL-MCNC: 3.32 MG/DL (ref 0.5–1.4)
EOSINOPHIL # BLD AUTO: 0.02 K/UL (ref 0–0.51)
EOSINOPHIL NFR BLD: 0.3 % (ref 0–6.9)
ERYTHROCYTE [DISTWIDTH] IN BLOOD BY AUTOMATED COUNT: 61.9 FL (ref 35.9–50)
ERYTHROCYTE [DISTWIDTH] IN BLOOD BY AUTOMATED COUNT: 62.8 FL (ref 35.9–50)
FLUAV RNA SPEC QL NAA+PROBE: NEGATIVE
FLUBV RNA SPEC QL NAA+PROBE: NEGATIVE
GLOBULIN SER CALC-MCNC: 5.3 G/DL (ref 1.9–3.5)
GLUCOSE SERPL-MCNC: 93 MG/DL (ref 65–99)
HCT VFR BLD AUTO: 28 % (ref 37–47)
HCT VFR BLD AUTO: 28.7 % (ref 37–47)
HCT VFR BLD AUTO: 29.9 % (ref 37–47)
HCT VFR BLD AUTO: 30.8 % (ref 37–47)
HGB BLD-MCNC: 8.3 G/DL (ref 12–16)
HGB BLD-MCNC: 8.4 G/DL (ref 12–16)
HGB BLD-MCNC: 8.6 G/DL (ref 12–16)
HGB BLD-MCNC: 9.1 G/DL (ref 12–16)
HYPOCHROMIA BLD QL SMEAR: ABNORMAL
IMM GRANULOCYTES # BLD AUTO: 0.03 K/UL (ref 0–0.11)
IMM GRANULOCYTES NFR BLD AUTO: 0.4 % (ref 0–0.9)
INR PPP: 1.15 (ref 0.87–1.13)
LIPASE SERPL-CCNC: 33 U/L (ref 7–58)
LYMPHOCYTES # BLD AUTO: 0.68 K/UL (ref 1–4.8)
LYMPHOCYTES NFR BLD: 10.2 % (ref 22–41)
MACROCYTES BLD QL SMEAR: ABNORMAL
MCH RBC QN AUTO: 26.1 PG (ref 27–33)
MCH RBC QN AUTO: 26.4 PG (ref 27–33)
MCHC RBC AUTO-ENTMCNC: 29.5 G/DL (ref 33.6–35)
MCHC RBC AUTO-ENTMCNC: 29.6 G/DL (ref 33.6–35)
MCV RBC AUTO: 88.5 FL (ref 81.4–97.8)
MCV RBC AUTO: 89.2 FL (ref 81.4–97.8)
MICROCYTES BLD QL SMEAR: ABNORMAL
MONOCYTES # BLD AUTO: 0.26 K/UL (ref 0–0.85)
MONOCYTES NFR BLD AUTO: 3.9 % (ref 0–13.4)
NEUTROPHILS # BLD AUTO: 5.66 K/UL (ref 2–7.15)
NEUTROPHILS NFR BLD: 84.6 % (ref 44–72)
NRBC # BLD AUTO: 0 K/UL
NRBC BLD-RTO: 0 /100 WBC
OVALOCYTES BLD QL SMEAR: NORMAL
PLATELET # BLD AUTO: 36 K/UL (ref 164–446)
PLATELET # BLD AUTO: 49 K/UL (ref 164–446)
PLATELET BLD QL SMEAR: NORMAL
PLATELETS.RETICULATED NFR BLD AUTO: 10.8 K/UL (ref 0.6–13.1)
POIKILOCYTOSIS BLD QL SMEAR: NORMAL
POLYCHROMASIA BLD QL SMEAR: NORMAL
POTASSIUM SERPL-SCNC: 4 MMOL/L (ref 3.6–5.5)
PROT SERPL-MCNC: 8.2 G/DL (ref 6–8.2)
PROTHROMBIN TIME: 14.4 SEC (ref 12–14.6)
RBC # BLD AUTO: 3.14 M/UL (ref 4.2–5.4)
RBC # BLD AUTO: 3.48 M/UL (ref 4.2–5.4)
RBC BLD AUTO: PRESENT
RSV RNA SPEC QL NAA+PROBE: NEGATIVE
SARS-COV-2 RNA RESP QL NAA+PROBE: NOTDETECTED
SODIUM SERPL-SCNC: 129 MMOL/L (ref 135–145)
SPECIMEN SOURCE: NORMAL
TROPONIN T SERPL-MCNC: 788 NG/L (ref 6–19)
TROPONIN T SERPL-MCNC: 825 NG/L (ref 6–19)
WBC # BLD AUTO: 6.2 K/UL (ref 4.8–10.8)
WBC # BLD AUTO: 6.7 K/UL (ref 4.8–10.8)

## 2021-03-27 PROCEDURE — 80053 COMPREHEN METABOLIC PANEL: CPT

## 2021-03-27 PROCEDURE — 700111 HCHG RX REV CODE 636 W/ 250 OVERRIDE (IP): Performed by: HOSPITALIST

## 2021-03-27 PROCEDURE — 700102 HCHG RX REV CODE 250 W/ 637 OVERRIDE(OP): Performed by: HOSPITALIST

## 2021-03-27 PROCEDURE — G0378 HOSPITAL OBSERVATION PER HR: HCPCS

## 2021-03-27 PROCEDURE — 85014 HEMATOCRIT: CPT

## 2021-03-27 PROCEDURE — 83690 ASSAY OF LIPASE: CPT

## 2021-03-27 PROCEDURE — 74176 CT ABD & PELVIS W/O CONTRAST: CPT

## 2021-03-27 PROCEDURE — 700111 HCHG RX REV CODE 636 W/ 250 OVERRIDE (IP): Performed by: EMERGENCY MEDICINE

## 2021-03-27 PROCEDURE — A9270 NON-COVERED ITEM OR SERVICE: HCPCS | Performed by: NURSE PRACTITIONER

## 2021-03-27 PROCEDURE — 96374 THER/PROPH/DIAG INJ IV PUSH: CPT

## 2021-03-27 PROCEDURE — 93005 ELECTROCARDIOGRAM TRACING: CPT | Performed by: EMERGENCY MEDICINE

## 2021-03-27 PROCEDURE — 85027 COMPLETE CBC AUTOMATED: CPT

## 2021-03-27 PROCEDURE — 85610 PROTHROMBIN TIME: CPT

## 2021-03-27 PROCEDURE — 84484 ASSAY OF TROPONIN QUANT: CPT

## 2021-03-27 PROCEDURE — 85025 COMPLETE CBC W/AUTO DIFF WBC: CPT

## 2021-03-27 PROCEDURE — A9270 NON-COVERED ITEM OR SERVICE: HCPCS | Performed by: HOSPITALIST

## 2021-03-27 PROCEDURE — C9113 INJ PANTOPRAZOLE SODIUM, VIA: HCPCS | Performed by: EMERGENCY MEDICINE

## 2021-03-27 PROCEDURE — 700102 HCHG RX REV CODE 250 W/ 637 OVERRIDE(OP): Performed by: NURSE PRACTITIONER

## 2021-03-27 PROCEDURE — 85055 RETICULATED PLATELET ASSAY: CPT

## 2021-03-27 PROCEDURE — 85730 THROMBOPLASTIN TIME PARTIAL: CPT

## 2021-03-27 PROCEDURE — 99220 PR INITIAL OBSERVATION CARE,LEVL III: CPT | Performed by: HOSPITALIST

## 2021-03-27 PROCEDURE — 96375 TX/PRO/DX INJ NEW DRUG ADDON: CPT

## 2021-03-27 PROCEDURE — 99285 EMERGENCY DEPT VISIT HI MDM: CPT

## 2021-03-27 PROCEDURE — 96376 TX/PRO/DX INJ SAME DRUG ADON: CPT

## 2021-03-27 PROCEDURE — 71045 X-RAY EXAM CHEST 1 VIEW: CPT

## 2021-03-27 PROCEDURE — 94760 N-INVAS EAR/PLS OXIMETRY 1: CPT

## 2021-03-27 PROCEDURE — 0241U HCHG SARS-COV-2 COVID-19 NFCT DS RESP RNA 4 TRGT MIC: CPT

## 2021-03-27 PROCEDURE — 85018 HEMOGLOBIN: CPT

## 2021-03-27 RX ORDER — PREDNISONE 5 MG/1
5 TABLET ORAL EVERY EVENING
Status: DISCONTINUED | OUTPATIENT
Start: 2021-03-27 | End: 2021-03-28

## 2021-03-27 RX ORDER — ACETAMINOPHEN 325 MG/1
650 TABLET ORAL EVERY 6 HOURS PRN
Status: DISCONTINUED | OUTPATIENT
Start: 2021-03-27 | End: 2021-03-30 | Stop reason: HOSPADM

## 2021-03-27 RX ORDER — AMOXICILLIN 250 MG
2 CAPSULE ORAL 2 TIMES DAILY
Status: DISCONTINUED | OUTPATIENT
Start: 2021-03-27 | End: 2021-03-30 | Stop reason: HOSPADM

## 2021-03-27 RX ORDER — PROMETHAZINE HYDROCHLORIDE 25 MG/1
12.5-25 TABLET ORAL EVERY 4 HOURS PRN
Status: DISCONTINUED | OUTPATIENT
Start: 2021-03-27 | End: 2021-03-30 | Stop reason: HOSPADM

## 2021-03-27 RX ORDER — ONDANSETRON 2 MG/ML
4 INJECTION INTRAMUSCULAR; INTRAVENOUS ONCE
Status: COMPLETED | OUTPATIENT
Start: 2021-03-27 | End: 2021-03-27

## 2021-03-27 RX ORDER — HYDROMORPHONE HYDROCHLORIDE 1 MG/ML
0.5 INJECTION, SOLUTION INTRAMUSCULAR; INTRAVENOUS; SUBCUTANEOUS ONCE
Status: COMPLETED | OUTPATIENT
Start: 2021-03-27 | End: 2021-03-27

## 2021-03-27 RX ORDER — MYCOPHENOLIC ACID 180 MG/1
180 TABLET, DELAYED RELEASE ORAL EVERY EVENING
Status: DISCONTINUED | OUTPATIENT
Start: 2021-03-27 | End: 2021-03-30 | Stop reason: HOSPADM

## 2021-03-27 RX ORDER — MYCOPHENOLIC ACID 180 MG/1
180 TABLET, DELAYED RELEASE ORAL EVERY EVENING
Status: DISCONTINUED | OUTPATIENT
Start: 2021-03-27 | End: 2021-03-27

## 2021-03-27 RX ORDER — HYDROXYCHLOROQUINE SULFATE 200 MG/1
200 TABLET, FILM COATED ORAL EVERY EVENING
Status: DISCONTINUED | OUTPATIENT
Start: 2021-03-27 | End: 2021-03-30 | Stop reason: HOSPADM

## 2021-03-27 RX ORDER — LORAZEPAM 2 MG/ML
0.5 INJECTION INTRAMUSCULAR ONCE
Status: COMPLETED | OUTPATIENT
Start: 2021-03-27 | End: 2021-03-27

## 2021-03-27 RX ORDER — HYDROMORPHONE HYDROCHLORIDE 1 MG/ML
0.5 INJECTION, SOLUTION INTRAMUSCULAR; INTRAVENOUS; SUBCUTANEOUS
Status: DISCONTINUED | OUTPATIENT
Start: 2021-03-27 | End: 2021-03-29

## 2021-03-27 RX ORDER — ONDANSETRON 4 MG/1
4 TABLET, ORALLY DISINTEGRATING ORAL EVERY 4 HOURS PRN
Status: DISCONTINUED | OUTPATIENT
Start: 2021-03-27 | End: 2021-03-30 | Stop reason: HOSPADM

## 2021-03-27 RX ORDER — PANTOPRAZOLE SODIUM 40 MG/10ML
80 INJECTION, POWDER, LYOPHILIZED, FOR SOLUTION INTRAVENOUS ONCE
Status: COMPLETED | OUTPATIENT
Start: 2021-03-27 | End: 2021-03-27

## 2021-03-27 RX ORDER — FUROSEMIDE 40 MG/1
40 TABLET ORAL
Status: DISCONTINUED | OUTPATIENT
Start: 2021-03-27 | End: 2021-03-30

## 2021-03-27 RX ORDER — SUCRALFATE ORAL 1 G/10ML
1 SUSPENSION ORAL
Status: DISCONTINUED | OUTPATIENT
Start: 2021-03-27 | End: 2021-03-30 | Stop reason: HOSPADM

## 2021-03-27 RX ORDER — ONDANSETRON 2 MG/ML
4 INJECTION INTRAMUSCULAR; INTRAVENOUS EVERY 4 HOURS PRN
Status: DISCONTINUED | OUTPATIENT
Start: 2021-03-27 | End: 2021-03-30 | Stop reason: HOSPADM

## 2021-03-27 RX ORDER — BISACODYL 10 MG
10 SUPPOSITORY, RECTAL RECTAL
Status: DISCONTINUED | OUTPATIENT
Start: 2021-03-27 | End: 2021-03-30 | Stop reason: HOSPADM

## 2021-03-27 RX ORDER — PROCHLORPERAZINE EDISYLATE 5 MG/ML
5-10 INJECTION INTRAMUSCULAR; INTRAVENOUS EVERY 4 HOURS PRN
Status: DISCONTINUED | OUTPATIENT
Start: 2021-03-27 | End: 2021-03-30 | Stop reason: HOSPADM

## 2021-03-27 RX ORDER — POLYETHYLENE GLYCOL 3350 17 G/17G
1 POWDER, FOR SOLUTION ORAL
Status: DISCONTINUED | OUTPATIENT
Start: 2021-03-27 | End: 2021-03-30 | Stop reason: HOSPADM

## 2021-03-27 RX ORDER — OMEPRAZOLE 20 MG/1
40 CAPSULE, DELAYED RELEASE ORAL 2 TIMES DAILY
Status: DISCONTINUED | OUTPATIENT
Start: 2021-03-27 | End: 2021-03-30 | Stop reason: HOSPADM

## 2021-03-27 RX ORDER — CARVEDILOL 25 MG/1
25 TABLET ORAL 2 TIMES DAILY WITH MEALS
Status: DISCONTINUED | OUTPATIENT
Start: 2021-03-27 | End: 2021-03-30 | Stop reason: HOSPADM

## 2021-03-27 RX ORDER — PROMETHAZINE HYDROCHLORIDE 25 MG/1
12.5-25 SUPPOSITORY RECTAL EVERY 4 HOURS PRN
Status: DISCONTINUED | OUTPATIENT
Start: 2021-03-27 | End: 2021-03-30 | Stop reason: HOSPADM

## 2021-03-27 RX ORDER — AMLODIPINE BESYLATE 5 MG/1
10 TABLET ORAL EVERY EVENING
Status: DISCONTINUED | OUTPATIENT
Start: 2021-03-28 | End: 2021-03-30 | Stop reason: HOSPADM

## 2021-03-27 RX ADMIN — MYCOPHENOLIC ACID 180 MG: 180 TABLET, DELAYED RELEASE ORAL at 18:00

## 2021-03-27 RX ADMIN — ONDANSETRON 4 MG: 2 INJECTION INTRAMUSCULAR; INTRAVENOUS at 14:35

## 2021-03-27 RX ADMIN — FUROSEMIDE 40 MG: 40 TABLET ORAL at 17:54

## 2021-03-27 RX ADMIN — FUROSEMIDE 40 MG: 40 TABLET ORAL at 08:51

## 2021-03-27 RX ADMIN — HYDROXYCHLOROQUINE SULFATE 200 MG: 200 TABLET, FILM COATED ORAL at 17:53

## 2021-03-27 RX ADMIN — SUCRALFATE 1 G: 1 SUSPENSION ORAL at 11:06

## 2021-03-27 RX ADMIN — HYDROMORPHONE HYDROCHLORIDE 0.5 MG: 1 INJECTION, SOLUTION INTRAMUSCULAR; INTRAVENOUS; SUBCUTANEOUS at 08:58

## 2021-03-27 RX ADMIN — HYDROMORPHONE HYDROCHLORIDE 0.5 MG: 1 INJECTION, SOLUTION INTRAMUSCULAR; INTRAVENOUS; SUBCUTANEOUS at 20:28

## 2021-03-27 RX ADMIN — LORAZEPAM 0.5 MG: 2 INJECTION INTRAMUSCULAR; INTRAVENOUS at 03:49

## 2021-03-27 RX ADMIN — LORAZEPAM 0.5 MG: 2 INJECTION INTRAMUSCULAR; INTRAVENOUS at 22:31

## 2021-03-27 RX ADMIN — ONDANSETRON 4 MG: 2 INJECTION INTRAMUSCULAR; INTRAVENOUS at 08:59

## 2021-03-27 RX ADMIN — HYDROMORPHONE HYDROCHLORIDE 0.5 MG: 1 INJECTION, SOLUTION INTRAMUSCULAR; INTRAVENOUS; SUBCUTANEOUS at 02:35

## 2021-03-27 RX ADMIN — SUCRALFATE 1 G: 1 SUSPENSION ORAL at 08:49

## 2021-03-27 RX ADMIN — HYDROMORPHONE HYDROCHLORIDE 0.5 MG: 1 INJECTION, SOLUTION INTRAMUSCULAR; INTRAVENOUS; SUBCUTANEOUS at 05:28

## 2021-03-27 RX ADMIN — CARVEDILOL 25 MG: 25 TABLET, FILM COATED ORAL at 17:53

## 2021-03-27 RX ADMIN — SUCRALFATE 1 G: 1 SUSPENSION ORAL at 17:00

## 2021-03-27 RX ADMIN — PANTOPRAZOLE SODIUM 80 MG: 40 INJECTION, POWDER, FOR SOLUTION INTRAVENOUS at 02:06

## 2021-03-27 RX ADMIN — PREDNISONE 5 MG: 10 TABLET ORAL at 17:54

## 2021-03-27 RX ADMIN — SUCRALFATE 1 G: 1 SUSPENSION ORAL at 20:27

## 2021-03-27 RX ADMIN — OMEPRAZOLE 40 MG: 20 CAPSULE, DELAYED RELEASE ORAL at 17:54

## 2021-03-27 RX ADMIN — CARVEDILOL 25 MG: 25 TABLET, FILM COATED ORAL at 08:48

## 2021-03-27 RX ADMIN — ONDANSETRON 4 MG: 2 INJECTION INTRAMUSCULAR; INTRAVENOUS at 22:14

## 2021-03-27 RX ADMIN — ONDANSETRON 4 MG: 2 INJECTION INTRAMUSCULAR; INTRAVENOUS at 02:03

## 2021-03-27 ASSESSMENT — ENCOUNTER SYMPTOMS
HEADACHES: 0
MYALGIAS: 0
DOUBLE VISION: 0
WHEEZING: 0
FEVER: 0
COUGH: 0
ABDOMINAL PAIN: 1
WEAKNESS: 0
SHORTNESS OF BREATH: 0
DOUBLE VISION: 1
HEARTBURN: 1
INSOMNIA: 0
DIZZINESS: 0
WEAKNESS: 1
MUSCULOSKELETAL NEGATIVE: 1
BLURRED VISION: 0
DEPRESSION: 0
PALPITATIONS: 0
WEIGHT LOSS: 0
VOMITING: 1
NECK PAIN: 0
CHILLS: 0
BLOOD IN STOOL: 0
SORE THROAT: 0
PSYCHIATRIC NEGATIVE: 1
NAUSEA: 1

## 2021-03-27 ASSESSMENT — PAIN DESCRIPTION - DESCRIPTORS: DESCRIPTORS: ACHING;BURNING

## 2021-03-27 ASSESSMENT — PATIENT HEALTH QUESTIONNAIRE - PHQ9
2. FEELING DOWN, DEPRESSED, IRRITABLE, OR HOPELESS: NOT AT ALL
1. LITTLE INTEREST OR PLEASURE IN DOING THINGS: NOT AT ALL
SUM OF ALL RESPONSES TO PHQ9 QUESTIONS 1 AND 2: 0
SUM OF ALL RESPONSES TO PHQ9 QUESTIONS 1 AND 2: 0
1. LITTLE INTEREST OR PLEASURE IN DOING THINGS: NOT AT ALL
2. FEELING DOWN, DEPRESSED, IRRITABLE, OR HOPELESS: NOT AT ALL

## 2021-03-27 ASSESSMENT — PAIN DESCRIPTION - PAIN TYPE
TYPE: ACUTE PAIN
TYPE: ACUTE PAIN

## 2021-03-27 ASSESSMENT — FIBROSIS 4 INDEX: FIB4 SCORE: 4.35

## 2021-03-27 ASSESSMENT — PAIN SCALES - WONG BAKER: WONGBAKER_NUMERICALRESPONSE: HURTS A LITTLE MORE

## 2021-03-27 NOTE — ASSESSMENT & PLAN NOTE
-Chest x-ray showing worsening bilateral interstitial and alveolar opacities, edema versus pneumonia.  -Closely monitor her respiratory status as she is also tachypneic, may need to have a discussion with nephrology about need of earlier hemodialysis session.  At this time though, will monitor hemoglobin considering that if she does need transfusion will be better to coordinate with hemodialysis at that time as well.

## 2021-03-27 NOTE — H&P
"Hospital Medicine History & Physical Note    Date of Service  3/27/2021    Primary Care Physician  lEla Matthew M.D.    Consultants  None    Code Status  Full Code    Chief Complaint  Chief Complaint   Patient presents with   • Abdominal Pain   Bloody emesis    History of Presenting Illness  25 y.o. 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 Most recently admission was on 3/19/21, again with upper GI bleed, with most recent EGD done by Dr. Mcqueen redemontrating  Karen-Keyes tear and also \"oozing from several small punctuate lesions in the stomach with diffuse nodular gastritis requiring Argon Plasma Coagulation (APC)\".  She required blood transfusions on both admissions for low hemoglobin and was discharged home on sucralfate 4 times a day for 2 weeks and PPIs.      Tonight, she presented to the emergency department on 3/27/2021 reporting once again bright red bloody emesis, 2 episodes this evening and worsening epigastric pain, severe and no radiation.    ER COURSE:  -Tachycardic on arrival with heart rate of 114 bpm, tachypneic with respiratory rate are around 130.  Blood pressure is elevated most recently 156/95.  -Initial hemoglobin was 9.1 and repeat hemoglobin was 8.3.  Discharge hemoglobin on 3/21 was 9.7  -Creatinine today is 3.32 with potassium of 4.0.  Troponin was elevated 825 ng/L but not having any chest pain.    Review of Systems  Review of Systems   Constitutional: Positive for malaise/fatigue. Negative for fever.   HENT: Negative for congestion and sore throat.    Eyes: Negative for blurred vision and double vision.   Respiratory: Negative for cough and shortness of breath.    Cardiovascular: Negative for chest pain and palpitations.   Gastrointestinal: Positive for abdominal pain, nausea and vomiting.   Genitourinary: Negative for dysuria and urgency. "   Musculoskeletal: Negative for myalgias and neck pain.   Skin: Negative for itching and rash.   Neurological: Negative for dizziness, weakness and headaches.   Psychiatric/Behavioral: Negative for depression. The patient does not have insomnia.        Past Medical History   has a past medical history of Anemia (01/17/2018), AVF (arteriovenous fistula) ([facility]), Dialysis patient ([facility]), ESRD (end stage renal disease) on dialysis ([facility]) (01/17/2018), Heart burn, Hypertension (01/17/2018), Indigestion, Lupus ([facility]), Migraines (01/17/2018), and Seizure ([facility]) (2013).    Surgical History   has a past surgical history that includes ronak by laparoscopy (4/5/2010); av fistula creation (Right); angioplasty (01/17/2018); other; other; gastroscopy-endo (12/9/2019); gastroscopy (N/A, 5/30/2020); gastroscopy-endo (9/18/2020); pr upper gi endoscopy,ctrl bleed (11/12/2020); pr upper gi endoscopy,biopsy (11/12/2020); gastroscopy-endo (11/12/2020); pr colonoscopy,diagnostic (1/8/2021); pr upper gi endoscopy,diagnosis (1/8/2021); pr upper gi endoscopy,ctrl bleed (1/8/2021); pr upper gi endoscopy,diagnosis (3/5/2021); pr upper gi endoscopy,diagnosis (N/A, 3/19/2021); and pr upper gi endoscopy,ctrl bleed (3/19/2021).     Family History  family history includes Diabetes in her paternal grandmother.     Social History   reports that she has never smoked. She has never used smokeless tobacco. She reports that she does not drink alcohol and does not use drugs.    Allergies  Allergies   Allergen Reactions   • Cephalexin Rash     .   • Clindamycin Rash     .   • Methylprednisolone Unspecified     Anxious   • Metoprolol Rash     .       Medications  Prior to Admission Medications   Prescriptions Last Dose Informant Patient Reported? Taking?   acetaminophen (TYLENOL) 500 MG Tab 3/27/2021 at Unknown time Patient Yes No   Sig: Take 500 mg by mouth every 6 hours as needed for Moderate Pain.   amLODIPine (NORVASC) 10 MG Tab 3/27/2021 at Unknown time   No No   Sig: Take 1 tablet by mouth every evening.   carvedilol (COREG) 25 MG Tab 3/27/2021 at Unknown time  No No   Sig: Take 1 tablet by mouth 2 times a day, with meals for 30 days.   furosemide (LASIX) 40 MG Tab 3/27/2021 at Unknown time Patient Yes No   Sig: Take 40 mg by mouth 2 (two) times a day.   hydroxychloroquine (PLAQUENIL) 200 MG Tab 3/27/2021 at Unknown time Patient Yes No   Sig: Take 200 mg by mouth every evening.   metoclopramide (REGLAN) 10 MG Tab 3/27/2021 at Unknown time  No No   Sig: Take 1 tablet by mouth 3 times a day before meals for 25 days.   mycophenolate (MYFORTIC) 180 MG EC tablet 3/27/2021 at Unknown time Patient No No   Sig: Take 1 Tab by mouth every evening.   ondansetron (ZOFRAN ODT) 4 MG TABLET DISPERSIBLE 3/27/2021 at Unknown time  No No   Sig: Take 1 tablet by mouth every four hours as needed for Nausea (give PO if no IV route available).   pantoprazole (PROTONIX) 40 MG Tablet Delayed Response 3/27/2021 at Unknown time  No No   Sig: Take 1 tablet by mouth 2 times a day for 30 days.   predniSONE (DELTASONE) 5 MG Tab 3/27/2021 at Unknown time Patient Yes No   Sig: Take 5 mg by mouth every evening.   sucralfate (CARAFATE) 1 GM/10ML Suspension 3/27/2021 at Unknown time  No No   Sig: Take 10 mL by mouth 4 Times a Day,Before Meals and at Bedtime.      Facility-Administered Medications: None       Physical Exam  Temp:  [36.9 °C (98.5 °F)] 36.9 °C (98.5 °F)  Pulse:  [] 109  Resp:  [16-40] 34  BP: (158-189)/() 158/87  SpO2:  [75 %-98 %] 92 %    Physical Exam  Constitutional:       Appearance: Normal appearance.   HENT:      Head: Normocephalic and atraumatic.      Mouth/Throat:      Mouth: Mucous membranes are moist.      Pharynx: Oropharynx is clear.   Eyes:      Extraocular Movements: Extraocular movements intact.      Pupils: Pupils are equal, round, and reactive to light.   Cardiovascular:      Rate and Rhythm: Regular rhythm. Tachycardia present.      Heart sounds: Normal  heart sounds.   Pulmonary:      Effort: No respiratory distress.      Breath sounds: Rales (Minimal Bibasilar rales) present. No wheezing or rhonchi.      Comments: Tachypnea  Abdominal:      General: Abdomen is flat. Bowel sounds are normal.      Palpations: Abdomen is soft.   Musculoskeletal:      Cervical back: Normal range of motion and neck supple.      Comments: Minimal Bilateral Lower extremity edema   Skin:     General: Skin is warm and dry.   Neurological:      General: No focal deficit present.      Mental Status: She is alert and oriented to person, place, and time.   Psychiatric:         Mood and Affect: Mood normal.         Behavior: Behavior normal.         Laboratory:  Recent Labs     03/27/21  0155 03/27/21  0346   WBC 6.7 6.2   RBC 3.48* 3.14*   HEMOGLOBIN 9.1* 8.3*   HEMATOCRIT 30.8* 28.0*   MCV 88.5 89.2   MCH 26.1* 26.4*   MCHC 29.5* 29.6*   RDW 61.9* 62.8*   PLATELETCT 49* 36*     Recent Labs     03/27/21  0155   SODIUM 129*   POTASSIUM 4.0   CHLORIDE 90*   CO2 34*   GLUCOSE 93   BUN 19   CREATININE 3.32*   CALCIUM 9.1     Recent Labs     03/27/21  0155   ALTSGPT 7   ASTSGOT 18   ALKPHOSPHAT 67   TBILIRUBIN 0.6   LIPASE 33   GLUCOSE 93     Recent Labs     03/27/21  0155   APTT 30.1   INR 1.15*     No results for input(s): NTPROBNP in the last 72 hours.      Recent Labs     03/27/21  0155   TROPONINT 825*       Imaging:  CT-RENAL COLIC EVALUATION(A/P W/O)   Final Result      1.  Lung bases show interstitial and extensive alveolar opacities as well as small effusions, likely pulmonary edema.  Superimposed pneumonia is not excluded.   2.  Atrophic kidneys.   3.  No CT evidence for appendicitis.   4.  Focally dilated small bowel loop in LEFT lower quadrant of uncertain significance.  No definite bowel obstruction.   5.  Periumbilical subcutaneous edema of uncertain significance.      DX-CHEST-PORTABLE (1 VIEW)   Final Result      1.  Worsening bilateral interstitial and alveolar opacities, edema  versus pneumonia.   2.  Minimal bilateral pleural effusions, increased from prior.   3.  No pneumoperitoneum demonstrated.            Assessment/Plan:  I anticipate this patient is appropriate for observation status at this time.    * Upper GI bleed- (present on admission)  Assessment & Plan  -Observation status on medical floor.  -Patient with 2 prior admissions with upper GI bleed in March, had 2 EGDs done (Dr. Mcqueen).  Has Karen-Keyes tear and her most recent EGD showed oozing from several small puncture lesions in the stomach with diffuse nodular gastritis requiring argon plasma coagulation.  -Patient initial hemoglobin was 9.1 and dropped to 8.3 a couple of hours later.  Doing serial H&H at this time and closely monitoring her vital signs.  If hemoglobin continues to drop will need to call GI consult in the morning.    Anemia associated with acute blood loss  Assessment & Plan  -Plan as above.    Pulmonary edema  Assessment & Plan  -Chest x-ray showing worsening bilateral interstitial and alveolar opacities, edema versus pneumonia.  -Closely monitor her respiratory status as she is also tachypneic, may need to have a discussion with nephrology about need of earlier hemodialysis session.  At this time though, will monitor hemoglobin considering that if she does need transfusion will be better to coordinate with hemodialysis at that time as well.    ESRD (end stage renal disease) on dialysis (HCC)- (present on admission)  Assessment & Plan  -She is on hemodialysis on Monday, Wednesdays and Friday and her nephrologist is Dr. Trent.  She has been compliant with her therapy and on admission creatinine is 3.32 normal potassium levels.  -Closely monitoring hemoglobin and respiratory status.  If anticipated that she will stay here longer than today, please contact nephrology to arrange hemodialysis inpatient.    Immunosuppression (HCC)  Assessment & Plan  -On mycophenolate and prednisone due to underlying lupus      Elevated INR  Assessment & Plan  -Chronically elevated, today 1.15.  Close monitoring.  Liver panel within normal limits.    Elevated troponin- (present on admission)  Assessment & Plan  -Troponin today is 825 but she has chronically elevated troponin, on prior admission it was 931 therefore attributing this to demand ischemia and underlying renal disease.  She is not complaining of chest pain at this time.    Lupus (HCC)- (present on admission)  Assessment & Plan  -Continue mycophenolate, prednisone and Plaquenil.  She also reports recently starting a type of vaccine for lupus but was not sure about more details.      DVT Ppx: SCD's

## 2021-03-27 NOTE — ED PROVIDER NOTES
ED Provider Note    ED Provider Note    Scribed for Allison Cormier MD by Allison Cormier M.D.. 3/27/2021, 1:50 AM.    Primary care provider: Ella Matthew M.D.  Means of arrival: Private  History obtained from: Patient  History limited by: None    CHIEF COMPLAINT  Chief Complaint   Patient presents with   • Abdominal Pain       HPI  Lily Nicole is a 25 y.o. female who presents to the Emergency Department for evaluation of upper abdominal pain with bright red bloody emesis.  Patient has unfortunately complicated medical history including dialysis dependent renal failure, she goes Monday Wednesday Friday and sees Dr. Trent.  Patient also has history of peptic ulcer disease and has had upper GI bleeding as recently as the fourth of this month.  Patient was found to have Karen-Keyes tear at that time.  She is currently taking a PPI but notes starting at midnight about an hour and half prior to arrival she began having worsening epigastric pain localized to the anterior abdomen without radiation with the associated multiple episodes of bloody emesis.  She is feeling weak and lightheaded but has had no syncope, denies fever.  She did her dialysis as usual yesterday.  She has not missed her dialysis as having no chest pain or productive cough.  Patient has had similar symptoms with multiple laboratory abnormalities and low hemoglobin requiring transfusion earlier this month.    REVIEW OF SYSTEMS  Pertinent positives include multiple episodes of bloody emesis, epigastric pain. Pertinent negatives include no fever, no trauma, no chest pain, no blood in stool.  All other systems reviewed and negative.    PAST MEDICAL HISTORY   has a past medical history of Anemia (01/17/2018), AVF (arteriovenous fistula) ([facility]), Dialysis patient ([facility]), ESRD (end stage renal disease) on dialysis ([facility]) (01/17/2018), Heart burn, Hypertension (01/17/2018), Indigestion, Lupus ([facility]), Migraines (01/17/2018), and Seizure  "(Formerly [facility]) (2013).    SURGICAL HISTORY   has a past surgical history that includes ronak by laparoscopy (4/5/2010); av fistula creation (Right); angioplasty (01/17/2018); other; other; gastroscopy-endo (12/9/2019); gastroscopy (N/A, 5/30/2020); gastroscopy-endo (9/18/2020); upper gi endoscopy,ctrl bleed (11/12/2020); upper gi endoscopy,biopsy (11/12/2020); gastroscopy-endo (11/12/2020); colonoscopy,diagnostic (1/8/2021); upper gi endoscopy,diagnosis (1/8/2021); upper gi endoscopy,ctrl bleed (1/8/2021); upper gi endoscopy,diagnosis (3/5/2021); upper gi endoscopy,diagnosis (N/A, 3/19/2021); and upper gi endoscopy,ctrl bleed (3/19/2021).    SOCIAL HISTORY  Social History     Tobacco Use   • Smoking status: Never Smoker   • Smokeless tobacco: Never Used   Substance Use Topics   • Alcohol use: No   • Drug use: No      Social History     Substance and Sexual Activity   Drug Use No       FAMILY HISTORY  Family History   Problem Relation Age of Onset   • Diabetes Paternal Grandmother        CURRENT MEDICATIONS  Home Medications    **Home medications have not yet been reviewed for this encounter**         ALLERGIES  Allergies   Allergen Reactions   • Cephalexin Rash     .   • Clindamycin Rash     .   • Methylprednisolone Unspecified     Anxious   • Metoprolol Rash     .       PHYSICAL EXAM  VITAL SIGNS: /87   Pulse (!) 109   Temp 36.9 °C (98.5 °F) (Oral)   Resp (!) 34   Ht 1.676 m (5' 6\")   Wt 56.7 kg (125 lb)   SpO2 92%   BMI 20.18 kg/m²     General: Alert, mild acute distress, ill-appearing  Skin: Warm, dry, pale  Head: Normocephalic, atraumatic  Neck: Trachea midline, no tenderness  Eye: PERRL, mild conjunctival pallor  ENMT: Oral mucosa pink and dry, no pharyngeal erythema or exudate  Cardiovascular: S1, S2, moderately tachycardic, otherwise regular rate and rhythm, No murmur, delayed capillary refill noted to the extremities  Respiratory: Lungs CTA, respirations are non-labored, breath sounds are " equal  Gastrointestinal: Soft, tenderness epigastrium, no guarding, no rebound, no rigidity.  Musculoskeletal: No swelling, no deformity  Neurological: Alert and oriented to person, place, time, and situation  Lymphatics: No lymphadenopathy  Psychiatric: Cooperative, mildly anxious, otherwise appropriate mood & affect      DIAGNOSTIC STUDIES/PROCEDURES    LABS  Results for orders placed or performed during the hospital encounter of 03/27/21   CBC WITH DIFFERENTIAL   Result Value Ref Range    WBC 6.7 4.8 - 10.8 K/uL    RBC 3.48 (L) 4.20 - 5.40 M/uL    Hemoglobin 9.1 (L) 12.0 - 16.0 g/dL    Hematocrit 30.8 (L) 37.0 - 47.0 %    MCV 88.5 81.4 - 97.8 fL    MCH 26.1 (L) 27.0 - 33.0 pg    MCHC 29.5 (L) 33.6 - 35.0 g/dL    RDW 61.9 (H) 35.9 - 50.0 fL    Platelet Count 49 (LL) 164 - 446 K/uL    Neutrophils-Polys 84.60 (H) 44.00 - 72.00 %    Lymphocytes 10.20 (L) 22.00 - 41.00 %    Monocytes 3.90 0.00 - 13.40 %    Eosinophils 0.30 0.00 - 6.90 %    Basophils 0.60 0.00 - 1.80 %    Immature Granulocytes 0.40 0.00 - 0.90 %    Nucleated RBC 0.00 /100 WBC    Neutrophils (Absolute) 5.66 2.00 - 7.15 K/uL    Lymphs (Absolute) 0.68 (L) 1.00 - 4.80 K/uL    Monos (Absolute) 0.26 0.00 - 0.85 K/uL    Eos (Absolute) 0.02 0.00 - 0.51 K/uL    Baso (Absolute) 0.04 0.00 - 0.12 K/uL    Immature Granulocytes (abs) 0.03 0.00 - 0.11 K/uL    NRBC (Absolute) 0.00 K/uL    Hypochromia 1+     Anisocytosis 1+     Macrocytosis 1+     Microcytosis 1+    COMP METABOLIC PANEL   Result Value Ref Range    Sodium 129 (L) 135 - 145 mmol/L    Potassium 4.0 3.6 - 5.5 mmol/L    Chloride 90 (L) 96 - 112 mmol/L    Co2 34 (H) 20 - 33 mmol/L    Anion Gap 5.0 (L) 7.0 - 16.0    Glucose 93 65 - 99 mg/dL    Bun 19 8 - 22 mg/dL    Creatinine 3.32 (H) 0.50 - 1.40 mg/dL    Calcium 9.1 8.4 - 10.2 mg/dL    AST(SGOT) 18 12 - 45 U/L    ALT(SGPT) 7 2 - 50 U/L    Alkaline Phosphatase 67 30 - 99 U/L    Total Bilirubin 0.6 0.1 - 1.5 mg/dL    Albumin 2.9 (L) 3.2 - 4.9 g/dL    Total  Protein 8.2 6.0 - 8.2 g/dL    Globulin 5.3 (H) 1.9 - 3.5 g/dL    A-G Ratio 0.5 g/dL   LIPASE   Result Value Ref Range    Lipase 33 7 - 58 U/L   TROPONIN   Result Value Ref Range    Troponin T 825 (H) 6 - 19 ng/L   Prothrombin Time   Result Value Ref Range    PT 14.4 12.0 - 14.6 sec    INR 1.15 (H) 0.87 - 1.13   APTT   Result Value Ref Range    APTT 30.1 24.7 - 36.0 sec   ESTIMATED GFR   Result Value Ref Range    GFR If  21 (A) >60 mL/min/1.73 m 2    GFR If Non African American 17 (A) >60 mL/min/1.73 m 2   PLATELET ESTIMATE   Result Value Ref Range    Plt Estimation Marked Decrease    MORPHOLOGY   Result Value Ref Range    RBC Morphology Present     Polychromia 1+     Poikilocytosis 1+     Ovalocytes 1+    IMMATURE PLT FRACTION   Result Value Ref Range    Imm. Plt Fraction 10.8 0.6 - 13.1 K/uL   DIFFERENTIAL COMMENT   Result Value Ref Range    Comments-Diff see below    CBC WITHOUT DIFFERENTIAL   Result Value Ref Range    WBC 6.2 4.8 - 10.8 K/uL    RBC 3.14 (L) 4.20 - 5.40 M/uL    Hemoglobin 8.3 (L) 12.0 - 16.0 g/dL    Hematocrit 28.0 (L) 37.0 - 47.0 %    MCV 89.2 81.4 - 97.8 fL    MCH 26.4 (L) 27.0 - 33.0 pg    MCHC 29.6 (L) 33.6 - 35.0 g/dL    RDW 62.8 (H) 35.9 - 50.0 fL    Platelet Count 36 (LL) 164 - 446 K/uL     All labs reviewed by me, initial hemoglobin nearly a gram lower than previous baseline.  Repeat hemoglobin nearly a gram lower than the initial.    EKG  12 Lead EKG obtained at 0135 and interpreted by me to show:  Rhythm: Sinus tachycardia  Rate: 112  Axis: Left  Intervals: Normal  Q Waves: Normal  No diagnostic ST segment elevation  Consistent with left ventricular hypertrophy  Clinical Impression: Normal EKG  Compared to none immediately available    RADIOLOGY  CT-RENAL COLIC EVALUATION(A/P W/O)   Final Result      1.  Lung bases show interstitial and extensive alveolar opacities as well as small effusions, likely pulmonary edema.  Superimposed pneumonia is not excluded.   2.  Atrophic  kidneys.   3.  No CT evidence for appendicitis.   4.  Focally dilated small bowel loop in LEFT lower quadrant of uncertain significance.  No definite bowel obstruction.   5.  Periumbilical subcutaneous edema of uncertain significance.      DX-CHEST-PORTABLE (1 VIEW)   Final Result      1.  Worsening bilateral interstitial and alveolar opacities, edema versus pneumonia.   2.  Minimal bilateral pleural effusions, increased from prior.   3.  No pneumoperitoneum demonstrated.        The radiologist's interpretation of all radiological studies have been reviewed by me.    COURSE & MEDICAL DECISION MAKING  Pertinent Labs & Imaging studies reviewed. (See chart for details)    1:50 AM - Patient seen and examined at bedside. Patient will be treated with Protonix 80 mg IV. Ordered metabolic work-up as well as coags, chest x-ray, abdominal/pelvic CT to evaluate her symptoms. The differential diagnoses include but are not limited to: Hemorrhagic esophagitis, hemorrhagic gastritis, peptic ulcer disease, Karen-Keyes tear    0340: No active vomiting acutely.  Patient's CBC is reassuring, however she has lost nearly a gram of hemoglobin compared to previous.  Given the acute GI bleeding I have ordered a repeat CBC at this time to evaluate further.  Patient reassessed at bedside, still mildly tachycardic but discomfort is improved.  She is feeling somewhat anxious which is quite reasonable, given she has already had 0.5 mg hydromorphone I will give her an attenuated dose of lorazepam, 0.5 mg.    0420: Unfortunate repeat hemogram is concerning, worsening thrombocytopenia and more importantly worsening microcytic anemia consistent with acute blood loss.  No indication for emergent transfusion at this time given otherwise hemodynamically stable but given presentation consistent with upper GI bleeding significant change in hemoglobin over only about 2 hours I do feel inpatient evaluation is warranted.  Thankfully no active hematemesis  "at this time after the Protonix.  I spoke with the hospitalist Dr. Madison who concurs and accepts the patient to her service.    Patient Vitals for the past 24 hrs:   BP Temp Temp src Pulse Resp SpO2 Height Weight   03/27/21 0349 158/87 -- -- (!) 109 -- -- -- --   03/27/21 0332 158/87 -- -- (!) 106 (!) 34 92 % -- --   03/27/21 0238 (!) 176/103 -- -- 81 16 (!) 75 % -- --   03/27/21 0132 (!) 173/120 -- -- (!) 113 (!) 40 98 % -- --   03/27/21 0126 (!) 189/113 36.9 °C (98.5 °F) Oral (!) 114 16 96 % -- --   03/27/21 0125 -- -- -- -- -- -- 1.676 m (5' 6\") 56.7 kg (125 lb)     Patient is critically ill.   The patient continues to have: Symptomatic anemia  The vital organ system that is affected is the: Vascular and GI  If untreated there is a high chance of deterioration into: Shock and exsanguination  And eventually death.   The critical care that I am providing today is: IV Protonix and admission to hospital  The critical that has been undertaken is medically complex.   There has been no overlap in critical care time.   Critical Care Time not including procedures: 30 minutes    Decision Making:  This is a 25 y.o. year old female who presents with symptoms concerning for acute upper GI bleed.  Patient underwent EGD earlier this month and have Karen-Keyes tear with previous/symptoms.  She has been taking her Protonix and has been well until this evening.  Patient doing well with IV Protonix in the ED and thankfully has no active hematemesis on my reassessment.  She is hypertensive consistent with her chronic kidney disease requiring dialysis, tachycardia persistent throughout stay.  Thankfully hemoglobin is 9.1 initially, somewhat depressed from her previous but not at the point where we would have to consider emergent transfusion.  Repeat CBC unfortunately is nearly a gram lower after only about 2 hours and as such I do feel inpatient management is warranted as I suspect she is still bleeding.  Imaging demonstrates no " perforation and no obstruction, there is evidence of pulmonary edema, I doubt this represents pneumonia given she has no cough, no fever, and no leukocytosis.    Patient admitted to the care of the hospitalist Dr. Madison to medical telemetry in guarded condition    FINAL IMPRESSION  1. Acute upper GI bleed    2. Hypertensive urgency    3. Symptomatic anemia    4. End stage renal disease on dialysis (HCC)    5. Thrombocytopenia (HCC)    6. Chronic pulmonary edema          Allison PARRA M.D. (Scribestela), am scribing for, and in the presence of, Allison Cormier MD.    Electronically signed by: Allison Cormier M.D. (Scribe), 3/27/2021    IAllison MD personally performed the services described in this documentation, as scribed by Allison Cormier M.D. in my presence, and it is both accurate and complete    The note accurately reflects work and decisions made by me.  Allison Cormier M.D.  3/27/2021  4:37 AM

## 2021-03-27 NOTE — ED TRIAGE NOTES
Pt c/o abd pain lower for over one month; has been seen at facility several times for complaint. States vomiting moderate amount of bright red blood today, no clots. Denies nausea at this time. Pt is on M/W/F HD. Last dialysis was Friday 03/26/21

## 2021-03-27 NOTE — PROGRESS NOTES
The patient was seen and examined at bedside. No further episodes of hematemesis since admission, denies melena. GI consulted, case discussed with Dr. Downing. Will check Hb this afternoon.

## 2021-03-27 NOTE — CARE PLAN
Problem: Communication  Goal: The ability to communicate needs accurately and effectively will improve  Outcome: PROGRESSING AS EXPECTED     Problem: Safety  Goal: Will remain free from injury  Outcome: PROGRESSING AS EXPECTED  Goal: Will remain free from falls  Outcome: PROGRESSING AS EXPECTED     Problem: Infection  Goal: Will remain free from infection  Outcome: PROGRESSING AS EXPECTED     Problem: Venous Thromboembolism (VTW)/Deep Vein Thrombosis (DVT) Prevention:  Goal: Patient will participate in Venous Thrombosis (VTE)/Deep Vein Thrombosis (DVT)Prevention Measures  Outcome: PROGRESSING AS EXPECTED     Problem: Bowel/Gastric:  Goal: Normal bowel function is maintained or improved  Outcome: PROGRESSING AS EXPECTED  Goal: Will not experience complications related to bowel motility  Outcome: PROGRESSING AS EXPECTED     Problem: Knowledge Deficit  Goal: Knowledge of disease process/condition, treatment plan, diagnostic tests, and medications will improve  Outcome: PROGRESSING AS EXPECTED

## 2021-03-27 NOTE — ASSESSMENT & PLAN NOTE
-Continue mycophenolate, prednisone and Plaquenil.  She also reports recently starting a type of vaccine for lupus but was not sure about more details.

## 2021-03-27 NOTE — ASSESSMENT & PLAN NOTE
-Patient with 2 prior admissions with upper GI bleed in March, had 2 EGDs done (Dr. Mcqueen).  Has Karen-Keyes tear and her most recent EGD showed oozing from several small puncture lesions in the stomach with diffuse nodular gastritis requiring argon plasma coagulation.  - Hb 9.7 to 8.1 over last 7 days  - monitor Hb over next 24 hours  - PPI BID  - GI consulted

## 2021-03-27 NOTE — ASSESSMENT & PLAN NOTE
-Troponin today is 825 but she has chronically elevated troponin, on prior admission it was 931 therefore attributing this to demand ischemia and underlying renal disease.  She is not complaining of chest pain at this time.

## 2021-03-27 NOTE — ASSESSMENT & PLAN NOTE
-She is on hemodialysis on Monday, Wednesdays and Friday and her nephrologist is Dr. Trent.  She has been compliant with her therapy and on admission creatinine is 3.32 normal potassium levels.  - Nephro consulted for HD in AM

## 2021-03-27 NOTE — CONSULTS
Gastroenterology Initial Consult Note               Author:  ERICA Boyce Date & Time Created: 3/27/2021 1:01 PM       Patient ID:  Name:             Lily Abdi  YOB: 1995  Age:                 25 y.o.  female  MRN:               [id number]      Referring Provider: Ella Matthew MD    Presenting Chief Complaint:  1. Hematemesis      History of Present Illness:    This is a very pleasant 25 y.o. female with the past medical history SLE, ESRD on HD (MWF), acute on chronic anemia, GERD, HTN, hemorrhagic gastritis, who presented to the ER 3/26/21 with 2 episodes of hematemesis, worsening epigastric abdominal pain.  ER labs Hgb: 9.1, repeat 8.3. Creatinine: 3.32. Denies fevers, chills, coffee ground emesis, melena.  No further episodes of hematemesis since admission.  Complaints of nausea without vomiting, epigastric pain, nonradiating.    PERTINENT GI HISTORY:    History of multiple EGD's due to GI bleed: Her gastroenter[facility]  She had an outpatient appointment with Dr. Kam February 2, 2021.  According to his notes, chronic GI blood loss secondary to hemorrhagic gastritis felt to be related to kidney disease.  Multiple endoscopies over the past year-APC in recently RFA of areas of the cardia and antrum where diffuse oozing has been noted without other discrete lesions.  She has a referral to [facility] gastroenterology for further evaluation.  Recommendations for PPI twice a day, sucralfate 4 times daily, thalidomide 100 mg daily.    EGD 3/19/21 performed by Dr. Mcqueen: Oozing from several small, puncate lesions in the stomach (predominatly antral but a few in the proximal stomach), in the setting of diffuse nodular gatsritis.  Karen Keyes Tear with adherent hemoclip (placed about ten days ago) noted just distal to the GE junction (no bleeding from this area).  Some retained food in the dependent portion of the stomach and the  "duodenum.  Duodenum unremarkable.  Intermittent bile flowing per ampulla. Argon Plasma Coagulation (APC) utilized to ablate the multiple small, presumed AVMs in the antrum and cardia of the stomach.     EGD 3/5/21 performed by Dr. Silva: Esophagus: Karen Keyes tear just distal to gastroesophageal junction, posterior wall, injected with 5mL of epinephrine and hemoclip applied. Stomach: retained food suggestive of gastroparesis, non-erosive gastritis biopsied to evaluate H.pylori status. Duodenum: endoscopically unremarkable to 2nd portion, duodenal biopsies obtained to evaluate for celiac sprue.    Colonoscopy January 2021 no obvious bleeding lesions.      Review of Systems:  Review of Systems   Constitutional: Negative for chills, fever and weight loss.   HENT: Negative for nosebleeds and sore throat.    Eyes: Positive for double vision. Negative for blurred vision.   Respiratory: Negative for shortness of breath and wheezing.    Cardiovascular: Negative for chest pain and palpitations.   Gastrointestinal: Positive for abdominal pain, heartburn, nausea and vomiting. Negative for blood in stool and melena.   Musculoskeletal: Negative.    Skin: Negative for itching and rash.   Neurological: Positive for weakness. Negative for dizziness and headaches.   Psychiatric/Behavioral: Negative.              Past Medical History:  Past Medical History:   Diagnosis Date   • Anemia 01/17/2018   • AVF (arteriovenous fistula) ([facility])     Right Arm   • Dialysis patient ([facility])      dialysis, M,W,F Elizabeth/Joni   • ESRD (end stage renal disease) on dialysis ([facility]) 01/17/2018    Twan Fu   • Heart burn    • Hypertension 01/17/2018    \"Controlled with medication\"   • Indigestion    • Lupus ([facility])    • Migraines 01/17/2018   • Seizure ([facility]) 2013    from high blood pressure, reports 1 time event     Active Hospital Problems    Diagnosis    • Anemia associated with acute blood loss [D62]      Priority: High   • Upper GI bleed [K92.2]      " Priority: High   • Pulmonary edema [J81.1]      Priority: Medium   • ESRD (end stage renal disease) on dialysis (HCC) [N18.6, Z99.2]      Priority: Medium   • Elevated INR [R79.1]      Priority: Low   • Elevated troponin [R77.8]      Priority: Low   • Lupus (HCC) [M32.9]      Priority: Low   • Immunosuppression (HCC) [D84.9]          Past Surgical History:  Past Surgical History:   Procedure Laterality Date   • PB UPPER GI ENDOSCOPY,DIAGNOSIS N/A 3/19/2021    Procedure: GASTROSCOPY;  Surgeon: Waylon Mcqueen M.D.;  Location: [facility];  Service: Gastroenterology   • PB UPPER GI ENDOSCOPY,CTRL BLEED  3/19/2021    Procedure: GASTROSCOPY, WITH ARGON PLASMA COAGULATION;  Surgeon: Waylon Mcqueen M.D.;  Location: [facility];  Service: Gastroenterology   • PB UPPER GI ENDOSCOPY,DIAGNOSIS  3/5/2021    Procedure: GASTROSCOPY - W/HEMOSTASIS;  Surgeon: Ej Silva M.D.;  Location: [facility];  Service: Gastroenterology   • PB COLONOSCOPY,DIAGNOSTIC  1/8/2021    Procedure: COLONOSCOPY;  Surgeon: Herbert Contreras M.D.;  Location: [facility];  Service: Gastroenterology   • PB UPPER GI ENDOSCOPY,DIAGNOSIS  1/8/2021    Procedure: GASTROSCOPY;  Surgeon: Herbert Contreras M.D.;  Location: [facility];  Service: Gastroenterology   • PB UPPER GI ENDOSCOPY,CTRL BLEED  1/8/2021    Procedure: GASTROSCOPY, WITH ARGON PLASMA COAGULATION;  Surgeon: Herbert Contreras M.D.;  Location: [facility];  Service: Gastroenterology   • PB UPPER GI ENDOSCOPY,CTRL BLEED  11/12/2020    Procedure: GASTROSCOPY, WITH ARGON PLASMA COAGULATION;  Surgeon: Gadiel Whitney M.D.;  Location: [facility];  Service: Gastroenterology   • PB UPPER GI ENDOSCOPY,BIOPSY  11/12/2020    Procedure: GASTROSCOPY, WITH BIOPSY;  Surgeon: Gadiel Whitney M.D.;  Location: [facility];  Service: Gastroenterology   • GASTROSCOPY-ENDO  11/12/2020    Procedure: GASTROSCOPY;  Surgeon: Gadiel Whitney M.D.;   "Location: SURGERY [facility];  Service: Gastroenterology   • GASTROSCOPY-ENDO  9/18/2020    Procedure: GASTROSCOPY;  Surgeon: Gadiel Whitney M.D.;  Location: SURGERY [facility];  Service: Gastroenterology   • GASTROSCOPY N/A 5/30/2020    Procedure: GASTROSCOPY;  Surgeon: Waylon Mcqueen M.D.;  Location: [facility];  Service: Gastroenterology   • GASTROSCOPY-ENDO  12/9/2019    Procedure: GASTROSCOPY;  Surgeon: Aaron Kam M.D.;  Location: [facility];  Service: Gastroenterology   • ANGIOPLASTY  01/17/2018    \"Right Arm AV-Fistulagram & Angioplastyx3\"   • ULI BY LAPAROSCOPY  4/5/2010    Performed by SYED MARTELL at [facility]   • AV FISTULA CREATION Right    • OTHER      renal biopsy x 3   • OTHER      bone marrow biopsy         Prior Endoscopic Procedures:  see above      Hospital Medications:  Current Facility-Administered Medications   Medication Dose Frequency Provider Last Rate Last Admin   • [START ON 3/28/2021] amLODIPine (NORVASC) tablet 10 mg  10 mg Q EVENING Argentina Chase M.D.       • carvedilol (COREG) tablet 25 mg  25 mg BID WITH MEALS Argentina Chase M.D.   25 mg at 03/27/21 0848   • furosemide (LASIX) tablet 40 mg  40 mg BID DIURETIC Argentina Chase M.D.   40 mg at 03/27/21 0851   • hydroxychloroquine (Plaquenil) tablet 200 mg  200 mg Q EVENING Argentina Chase M.D.       • mycophenolate (MYFORTIC) TBEC 180 mg  180 mg Q EVENING Argentina Chase M.D.       • predniSONE (DELTASONE) tablet 5 mg  5 mg Q EVENING Argentina Chase M.D.       • sucralfate (CARAFATE) 1 GM/10ML suspension 1 g  1 g 4X/DAY ACHS Argentina Chase M.D.   1 g at 03/27/21 1106   • acetaminophen (Tylenol) tablet 650 mg  650 mg Q6HRS PRN Argentina Chase M.D.       • ondansetron (ZOFRAN) syringe/vial injection 4 mg  4 mg Q4HRS PRN Argentina Chase M.D.   4 mg at 03/27/21 0859   • ondansetron (ZOFRAN ODT) " dispertab 4 mg  4 mg Q4HRS PRRIP Chase M.D.   Stopped at 03/27/21 0837   • promethazine (PHENERGAN) tablet 12.5-25 mg  12.5-25 mg Q4HRS PRRIP Chase M.D.       • promethazine (PHENERGAN) suppository 12.5-25 mg  12.5-25 mg Q4HRS PRRIP Chase M.D.       • prochlorperazine (COMPAZINE) injection 5-10 mg  5-10 mg Q4HRS PRRIP Chase M.D.       • senna-docusate (PERICOLACE or SENOKOT S) 8.6-50 MG per tablet 2 tablet  2 tablet BID Argentina Chase M.D.   Stopped at 03/27/21 0600    And   • polyethylene glycol/lytes (MIRALAX) PACKET 1 Packet  1 Packet QDAY PRRIP Chase M.D.        And   • magnesium hydroxide (MILK OF MAGNESIA) suspension 30 mL  30 mL QDAY PRRIP Chase M.D.        And   • bisacodyl (DULCOLAX) suppository 10 mg  10 mg QDAY PRRIP Chase M.D.       • HYDROmorphone (Dilaudid) injection 0.5 mg  0.5 mg Q3HRS PRRIP Chase M.D.   0.5 mg at 03/27/21 0858   Last reviewed on 3/21/2021  9:09 AM by Zonia Long R.N.        Current Outpatient Medications:  Medications Prior to Admission   Medication Sig Dispense Refill Last Dose   • sucralfate (CARAFATE) 1 GM/10ML Suspension Take 10 mL by mouth 4 Times a Day,Before Meals and at Bedtime. 1200 mL 1 3/27/2021 at Unknown time   • pantoprazole (PROTONIX) 40 MG Tablet Delayed Response Take 1 tablet by mouth 2 times a day for 30 days. 60 tablet 0 3/27/2021 at Unknown time   • metoclopramide (REGLAN) 10 MG Tab Take 1 tablet by mouth 3 times a day before meals for 25 days. 75 tablet 0 3/27/2021 at Unknown time   • carvedilol (COREG) 25 MG Tab Take 1 tablet by mouth 2 times a day, with meals for 30 days. 60 tablet 0 3/27/2021 at Unknown time   • amLODIPine (NORVASC) 10 MG Tab Take 1 tablet by mouth every evening. 90 tablet 3 3/27/2021 at Unknown time   • ondansetron (ZOFRAN ODT) 4 MG TABLET DISPERSIBLE Take 1 tablet by mouth every four hours as needed for  Nausea (give PO if no IV route available). 20 tablet 0 3/27/2021 at Unknown time   • furosemide (LASIX) 40 MG Tab Take 40 mg by mouth 2 (two) times a day.   3/27/2021 at Unknown time   • predniSONE (DELTASONE) 5 MG Tab Take 5 mg by mouth every evening.   3/27/2021 at Unknown time   • mycophenolate (MYFORTIC) 180 MG EC tablet Take 1 Tab by mouth every evening. 30 Tab 1 3/27/2021 at Unknown time   • acetaminophen (TYLENOL) 500 MG Tab Take 500 mg by mouth every 6 hours as needed for Moderate Pain.   3/27/2021 at Unknown time   • hydroxychloroquine (PLAQUENIL) 200 MG Tab Take 200 mg by mouth every evening.   3/27/2021 at Unknown time         Medication Allergies:  Allergies   Allergen Reactions   • Cephalexin Rash     .   • Clindamycin Rash     .   • Methylprednisolone Unspecified     Anxious   • Metoprolol Rash     .         Family Medical History:  Family History   Problem Relation Age of Onset   • Diabetes Paternal Grandmother          Social History:  Social History     Socioeconomic History   • Marital status: Single     Spouse name: Not on file   • Number of children: Not on file   • Years of education: Not on file   • Highest education level: Not on file   Occupational History   • Not on file   Tobacco Use   • Smoking status: Never Smoker   • Smokeless tobacco: Never Used   Substance and Sexual Activity   • Alcohol use: No   • Drug use: No   • Sexual activity: Not on file   Other Topics Concern   • Not on file   Social History Narrative   • Not on file     Social Determinants of Health     Financial Resource Strain: Low Risk    • Difficulty of Paying Living Expenses: Not hard at all   Food Insecurity: No Food Insecurity   • Worried About Running Out of Food in the Last Year: Never true   • Ran Out of Food in the Last Year: Never true   Transportation Needs: No Transportation Needs   • Lack of Transportation (Medical): No   • Lack of Transportation (Non-Medical): No   Physical Activity:    • Days of Exercise per  "Week:    • Minutes of Exercise per Session:    Stress:    • Feeling of Stress :    Social Connections:    • Frequency of Communication with Friends and Family:    • Frequency of Social Gatherings with Friends and Family:    • Attends [employer]oroast[employer] Services:    • Active Member of Clubs or Organizations:    • Attends Club or Organization Meetings:    • Marital Status:    Intimate Partner Violence:    • Fear of Current or Ex-Partner:    • Emotionally Abused:    • Physically Abused:    • Sexually Abused:          Vital signs:  Weight/BMI: Body mass index is 20.18 kg/m².  /111   Pulse (!) 107   Temp 36.6 °C (97.8 °F) (Oral)   Resp 17   Ht 1.676 m (5' 6\")   Wt 56.7 kg (125 lb)   SpO2 97%   Vitals:    03/27/21 0700 03/27/21 0727 03/27/21 0732 03/27/21 0824   BP:   (!) 167/103 156/111   Pulse: (!) 105 (!) 105 (!) 106 (!) 107   Resp:    17   Temp:    36.6 °C (97.8 °F)   TempSrc:    Oral   SpO2: 99% 99% 99% 97%   Weight:       Height:         Oxygen Therapy:  Pulse Oximetry: 97 %, O2 Delivery Device: None - Room Air    Intake/Output Summary (Last 24 hours) at 3/27/2021 1301  Last data filed at 3/27/2021 1000  Gross per 24 hour   Intake 160 ml   Output --   Net 160 ml         Physical Exam:  Physical Exam  Constitutional:       Appearance: Normal appearance.      Comments: Right AV fistula upper arm   HENT:      Head: Normocephalic and atraumatic.      Mouth/Throat:      Mouth: Mucous membranes are moist.      Pharynx: Oropharynx is clear.   Eyes:      Extraocular Movements: Extraocular movements intact.      Pupils: Pupils are equal, round, and reactive to light.   Cardiovascular:      Rate and Rhythm: Normal rate and regular rhythm.   Pulmonary:      Effort: Pulmonary effort is normal.      Breath sounds: Normal breath sounds.   Abdominal:      General: Abdomen is flat. Bowel sounds are normal. There is no distension.      Palpations: Abdomen is soft.      Tenderness: There is no abdominal tenderness. "   Musculoskeletal:         General: Normal range of motion.      Cervical back: Normal range of motion and neck supple.   Skin:     General: Skin is warm and dry.   Neurological:      General: No focal deficit present.      Mental Status: She is alert and oriented to person, place, and time.             Labs:  Recent Labs     03/27/21  0155   SODIUM 129*   POTASSIUM 4.0   CHLORIDE 90*   CO2 34*   BUN 19   CREATININE 3.32*   CALCIUM 9.1     Recent Labs     03/27/21  0155   ALTSGPT 7   ASTSGOT 18   ALKPHOSPHAT 67   TBILIRUBIN 0.6   LIPASE 33   GLUCOSE 93     Recent Labs     03/27/21  0155 03/27/21  0346   WBC 6.7 6.2   NEUTSPOLYS 84.60*  --    LYMPHOCYTES 10.20*  --    MONOCYTES 3.90  --    EOSINOPHILS 0.30  --    BASOPHILS 0.60  --    ASTSGOT 18  --    ALTSGPT 7  --    ALKPHOSPHAT 67  --    TBILIRUBIN 0.6  --      Recent Labs     03/27/21 0155 03/27/21  0346 03/27/21  1015   RBC 3.48* 3.14*  --    HEMOGLOBIN 9.1* 8.3* 8.4*   HEMATOCRIT 30.8* 28.0* 28.7*   PLATELETCT 49* 36*  --    PROTHROMBTM 14.4  --   --    APTT 30.1  --   --    INR 1.15*  --   --      Recent Results (from the past 24 hour(s))   CBC WITH DIFFERENTIAL    Collection Time: 03/27/21  1:55 AM   Result Value Ref Range    WBC 6.7 4.8 - 10.8 K/uL    RBC 3.48 (L) 4.20 - 5.40 M/uL    Hemoglobin 9.1 (L) 12.0 - 16.0 g/dL    Hematocrit 30.8 (L) 37.0 - 47.0 %    MCV 88.5 81.4 - 97.8 fL    MCH 26.1 (L) 27.0 - 33.0 pg    MCHC 29.5 (L) 33.6 - 35.0 g/dL    RDW 61.9 (H) 35.9 - 50.0 fL    Platelet Count 49 (LL) 164 - 446 K/uL    Neutrophils-Polys 84.60 (H) 44.00 - 72.00 %    Lymphocytes 10.20 (L) 22.00 - 41.00 %    Monocytes 3.90 0.00 - 13.40 %    Eosinophils 0.30 0.00 - 6.90 %    Basophils 0.60 0.00 - 1.80 %    Immature Granulocytes 0.40 0.00 - 0.90 %    Nucleated RBC 0.00 /100 WBC    Neutrophils (Absolute) 5.66 2.00 - 7.15 K/uL    Lymphs (Absolute) 0.68 (L) 1.00 - 4.80 K/uL    Monos (Absolute) 0.26 0.00 - 0.85 K/uL    Eos (Absolute) 0.02 0.00 - 0.51 K/uL    Baso  (Absolute) 0.04 0.00 - 0.12 K/uL    Immature Granulocytes (abs) 0.03 0.00 - 0.11 K/uL    NRBC (Absolute) 0.00 K/uL    Hypochromia 1+     Anisocytosis 1+     Macrocytosis 1+     Microcytosis 1+    COMP METABOLIC PANEL    Collection Time: 03/27/21  1:55 AM   Result Value Ref Range    Sodium 129 (L) 135 - 145 mmol/L    Potassium 4.0 3.6 - 5.5 mmol/L    Chloride 90 (L) 96 - 112 mmol/L    Co2 34 (H) 20 - 33 mmol/L    Anion Gap 5.0 (L) 7.0 - 16.0    Glucose 93 65 - 99 mg/dL    Bun 19 8 - 22 mg/dL    Creatinine 3.32 (H) 0.50 - 1.40 mg/dL    Calcium 9.1 8.4 - 10.2 mg/dL    AST(SGOT) 18 12 - 45 U/L    ALT(SGPT) 7 2 - 50 U/L    Alkaline Phosphatase 67 30 - 99 U/L    Total Bilirubin 0.6 0.1 - 1.5 mg/dL    Albumin 2.9 (L) 3.2 - 4.9 g/dL    Total Protein 8.2 6.0 - 8.2 g/dL    Globulin 5.3 (H) 1.9 - 3.5 g/dL    A-G Ratio 0.5 g/dL   LIPASE    Collection Time: 03/27/21  1:55 AM   Result Value Ref Range    Lipase 33 7 - 58 U/L   TROPONIN    Collection Time: 03/27/21  1:55 AM   Result Value Ref Range    Troponin T 825 (H) 6 - 19 ng/L   Prothrombin Time    Collection Time: 03/27/21  1:55 AM   Result Value Ref Range    PT 14.4 12.0 - 14.6 sec    INR 1.15 (H) 0.87 - 1.13   APTT    Collection Time: 03/27/21  1:55 AM   Result Value Ref Range    APTT 30.1 24.7 - 36.0 sec   ESTIMATED GFR    Collection Time: 03/27/21  1:55 AM   Result Value Ref Range    GFR If  21 (A) >60 mL/min/1.73 m 2    GFR If Non African American 17 (A) >60 mL/min/1.73 m 2   PLATELET ESTIMATE    Collection Time: 03/27/21  1:55 AM   Result Value Ref Range    Plt Estimation Marked Decrease    MORPHOLOGY    Collection Time: 03/27/21  1:55 AM   Result Value Ref Range    RBC Morphology Present     Polychromia 1+     Poikilocytosis 1+     Ovalocytes 1+    IMMATURE PLT FRACTION    Collection Time: 03/27/21  1:55 AM   Result Value Ref Range    Imm. Plt Fraction 10.8 0.6 - 13.1 K/uL   DIFFERENTIAL COMMENT    Collection Time: 03/27/21  1:55 AM   Result Value Ref  Range    Comments-Diff see below    CBC WITHOUT DIFFERENTIAL    Collection Time: 03/27/21  3:46 AM   Result Value Ref Range    WBC 6.2 4.8 - 10.8 K/uL    RBC 3.14 (L) 4.20 - 5.40 M/uL    Hemoglobin 8.3 (L) 12.0 - 16.0 g/dL    Hematocrit 28.0 (L) 37.0 - 47.0 %    MCV 89.2 81.4 - 97.8 fL    MCH 26.4 (L) 27.0 - 33.0 pg    MCHC 29.6 (L) 33.6 - 35.0 g/dL    RDW 62.8 (H) 35.9 - 50.0 fL    Platelet Count 36 (LL) 164 - 446 K/uL   COV-2, FLU A/B, AND RSV BY PCR (2-4 HOURS CEPHEID): Collect NP swab in VTM    Collection Time: 03/27/21  6:23 AM    Specimen: Respirate   Result Value Ref Range    Influenza virus A RNA Negative Negative    Influenza virus B, PCR Negative Negative    RSV, PCR Negative Negative    SARS-CoV-2 by PCR NotDetected     SARS-CoV-2 Source NP Swab    HEMOGLOBIN AND HEMATOCRIT    Collection Time: 03/27/21 10:15 AM   Result Value Ref Range    Hemoglobin 8.4 (L) 12.0 - 16.0 g/dL    Hematocrit 28.7 (L) 37.0 - 47.0 %   TROPONIN    Collection Time: 03/27/21 10:15 AM   Result Value Ref Range    Troponin T 788 (H) 6 - 19 ng/L         Radiology Review:  CT-RENAL COLIC EVALUATION(A/P W/O)   Final Result      1.  Lung bases show interstitial and extensive alveolar opacities as well as small effusions, likely pulmonary edema.  Superimposed pneumonia is not excluded.   2.  Atrophic kidneys.   3.  No CT evidence for appendicitis.   4.  Focally dilated small bowel loop in LEFT lower quadrant of uncertain significance.  No definite bowel obstruction.   5.  Periumbilical subcutaneous edema of uncertain significance.      DX-CHEST-PORTABLE (1 VIEW)   Final Result      1.  Worsening bilateral interstitial and alveolar opacities, edema versus pneumonia.   2.  Minimal bilateral pleural effusions, increased from prior.   3.  No pneumoperitoneum demonstrated.            MDM (Data Review):   -Records reviewed and summarized in current documentation  -I personally reviewed and interpreted the laboratory results  -I personally  reviewed the radiology images      Medical Decision Making, by Problem:  Active Hospital Problems    Diagnosis    • Anemia associated with acute blood loss [D62]      Priority: High   • Upper GI bleed [K92.2]      Priority: High   • Pulmonary edema [J81.1]      Priority: Medium   • ESRD (end stage renal disease) on dialysis (HCC) [N18.6, Z99.2]      Priority: Medium   • Elevated INR [R79.1]      Priority: Low   • Elevated troponin [R77.8]      Priority: Low   • Lupus (HCC) [M32.9]      Priority: Low   • Immunosuppression (HCC) [D84.9]      This is a pleasant 25-year-old female admitted to the hospital with 2 episodes of hematemesis, worsening epigastric abdominal pain.  History of hemorrhagic gastritis felt secondary to her chronic kidney disease.  She has had multiple EGDs with APCs and RFA's due to chronic oozing AVMs and has a history of a Karen-Keyes tear.      Assessment/Recommendations:      1.  Hematemesis: Likely due to hemorrhagic gastritis that is oozing.  Also, could be from the Karen-Keyes tear that was identified on 3/19/2021.  2.  GERD-likely due to hemorrhagic gastritis.  She does well on PPI twice a day.  Complaints of epigastric pain.  3.  Acute on chronic anemia: Likely due to hemorrhagic gastritis and ESRD.  Patient receives blood transfusions every other week.  Current Hgb; 8.4.  Has not needed blood transfusions during this admission.  4.  ESRD-on hemodialysis (MWF)  5.  Elevated troponin.  Appears to be chronic-3 months ago troponins 785.  1 month ago-troponin 931.  Current, 825.  6.  Covid-negative    -Risk versus benefits of EGD was discussed with patient.  Risk include heart and lung event secondary to anesthesia.  Other risk include perforation, bleeding, infection.  Questions were answered.  -Start Protonix 40mg po BID  -Continue Sucralfate 1gm QiD before meals  -May consider Thalidomide 100mg daily (per Dr. Kam's recommendations 2/2/21)  -NPO after midnight      Thank you very  much for allowing me to participate in the care of your patient.  Please feel free to contact me anytime at [phone].     Kiersten Louis, A.P.R.N.    Core Quality Measures   Reviewed items::  Labs, Medications and Radiology reports reviewed

## 2021-03-28 ENCOUNTER — APPOINTMENT (OUTPATIENT)
Dept: RADIOLOGY | Facility: MEDICAL CENTER | Age: 26
DRG: 189 | End: 2021-03-28
Attending: HOSPITALIST
Payer: COMMERCIAL

## 2021-03-28 PROBLEM — R04.2 HEMOPTYSIS: Status: ACTIVE | Noted: 2021-03-28

## 2021-03-28 LAB
ACTION RANGE TRIGGERED IACRT: NO
ALBUMIN SERPL BCP-MCNC: 2.7 G/DL (ref 3.2–4.9)
ALBUMIN/GLOB SERPL: 0.5 G/DL
ALP SERPL-CCNC: 62 U/L (ref 30–99)
ALT SERPL-CCNC: 6 U/L (ref 2–50)
ANION GAP SERPL CALC-SCNC: 12 MMOL/L (ref 7–16)
ANION GAP SERPL CALC-SCNC: 8 MMOL/L (ref 7–16)
APTT PPP: 27.1 SEC (ref 24.7–36)
AST SERPL-CCNC: 15 U/L (ref 12–45)
BARCODED ABORH UBTYP: 5100
BARCODED PRD CODE UBPRD: NORMAL
BARCODED UNIT NUM UBUNT: NORMAL
BASE EXCESS BLDA CALC-SCNC: 5 MMOL/L (ref -4–3)
BILIRUB SERPL-MCNC: 0.5 MG/DL (ref 0.1–1.5)
BODY TEMPERATURE: ABNORMAL DEGREES
BUN SERPL-MCNC: 38 MG/DL (ref 8–22)
BUN SERPL-MCNC: 51 MG/DL (ref 8–22)
CALCIUM SERPL-MCNC: 8.6 MG/DL (ref 8.4–10.2)
CALCIUM SERPL-MCNC: 8.7 MG/DL (ref 8.4–10.2)
CHLORIDE SERPL-SCNC: 89 MMOL/L (ref 96–112)
CHLORIDE SERPL-SCNC: 91 MMOL/L (ref 96–112)
CO2 BLDA-SCNC: 31 MMOL/L (ref 20–33)
CO2 SERPL-SCNC: 27 MMOL/L (ref 20–33)
CO2 SERPL-SCNC: 30 MMOL/L (ref 20–33)
COMPONENT P 8504P: NORMAL
CREAT SERPL-MCNC: 5.04 MG/DL (ref 0.5–1.4)
CREAT SERPL-MCNC: 6.07 MG/DL (ref 0.5–1.4)
ERYTHROCYTE [DISTWIDTH] IN BLOOD BY AUTOMATED COUNT: 62 FL (ref 35.9–50)
ERYTHROCYTE [DISTWIDTH] IN BLOOD BY AUTOMATED COUNT: 62.1 FL (ref 35.9–50)
GLOBULIN SER CALC-MCNC: 5.2 G/DL (ref 1.9–3.5)
GLUCOSE SERPL-MCNC: 83 MG/DL (ref 65–99)
GLUCOSE SERPL-MCNC: 90 MG/DL (ref 65–99)
HAV IGM SERPL QL IA: NORMAL
HBV CORE IGM SER QL: NORMAL
HBV SURFACE AG SER QL: NORMAL
HCO3 BLDA-SCNC: 29.9 MMOL/L (ref 17–25)
HCT VFR BLD AUTO: 27.8 % (ref 37–47)
HCT VFR BLD AUTO: 31.9 % (ref 37–47)
HCV AB SER QL: NORMAL
HGB BLD-MCNC: 8.1 G/DL (ref 12–16)
HGB BLD-MCNC: 9.4 G/DL (ref 12–16)
HOROWITZ INDEX BLDA+IHG-RTO: 66 MM[HG]
INST. QUALIFIED PATIENT IIQPT: YES
LDH SERPL L TO P-CCNC: 308 U/L (ref 107–266)
MCH RBC QN AUTO: 26.2 PG (ref 27–33)
MCH RBC QN AUTO: 26.3 PG (ref 27–33)
MCHC RBC AUTO-ENTMCNC: 29.1 G/DL (ref 33.6–35)
MCHC RBC AUTO-ENTMCNC: 29.5 G/DL (ref 33.6–35)
MCV RBC AUTO: 89.4 FL (ref 81.4–97.8)
MCV RBC AUTO: 90 FL (ref 81.4–97.8)
O2/TOTAL GAS SETTING VFR VENT: 100 %
PCO2 BLDA: 42.9 MMHG (ref 26–37)
PH BLDA: 7.45 [PH] (ref 7.4–7.5)
PH TEMP ADJ BLDA: 7.44 [PH] (ref 7.4–7.5)
PLATELET # BLD AUTO: 31 K/UL (ref 164–446)
PLATELET # BLD AUTO: 70 K/UL (ref 164–446)
PLATELETS.RETICULATED NFR BLD AUTO: 15.5 K/UL (ref 0.6–13.1)
PO2 BLDA: 66 MMHG (ref 64–87)
PO2 TEMP ADJ BLDA: 68 MMHG (ref 64–87)
POTASSIUM SERPL-SCNC: 4.4 MMOL/L (ref 3.6–5.5)
POTASSIUM SERPL-SCNC: 4.9 MMOL/L (ref 3.6–5.5)
PRODUCT TYPE UPROD: NORMAL
PROT SERPL-MCNC: 7.9 G/DL (ref 6–8.2)
RBC # BLD AUTO: 3.09 M/UL (ref 4.2–5.4)
RBC # BLD AUTO: 3.57 M/UL (ref 4.2–5.4)
SAO2 % BLDA: 93 % (ref 93–99)
SODIUM SERPL-SCNC: 128 MMOL/L (ref 135–145)
SODIUM SERPL-SCNC: 129 MMOL/L (ref 135–145)
SPECIMEN DRAWN FROM PATIENT: ABNORMAL
TROPONIN T SERPL-MCNC: 842 NG/L (ref 6–19)
UNIT STATUS USTAT: NORMAL
WBC # BLD AUTO: 10.4 K/UL (ref 4.8–10.8)
WBC # BLD AUTO: 3.9 K/UL (ref 4.8–10.8)

## 2021-03-28 PROCEDURE — 93005 ELECTROCARDIOGRAM TRACING: CPT | Performed by: HOSPITALIST

## 2021-03-28 PROCEDURE — 700105 HCHG RX REV CODE 258

## 2021-03-28 PROCEDURE — 71045 X-RAY EXAM CHEST 1 VIEW: CPT

## 2021-03-28 PROCEDURE — 700102 HCHG RX REV CODE 250 W/ 637 OVERRIDE(OP): Performed by: HOSPITALIST

## 2021-03-28 PROCEDURE — 700105 HCHG RX REV CODE 258: Performed by: INTERNAL MEDICINE

## 2021-03-28 PROCEDURE — A9270 NON-COVERED ITEM OR SERVICE: HCPCS | Performed by: HOSPITALIST

## 2021-03-28 PROCEDURE — 86945 BLOOD PRODUCT/IRRADIATION: CPT

## 2021-03-28 PROCEDURE — 85384 FIBRINOGEN ACTIVITY: CPT

## 2021-03-28 PROCEDURE — 83615 LACTATE (LD) (LDH) ENZYME: CPT

## 2021-03-28 PROCEDURE — 80053 COMPREHEN METABOLIC PANEL: CPT

## 2021-03-28 PROCEDURE — 5A09357 ASSISTANCE WITH RESPIRATORY VENTILATION, LESS THAN 24 CONSECUTIVE HOURS, CONTINUOUS POSITIVE AIRWAY PRESSURE: ICD-10-PCS | Performed by: HOSPITALIST

## 2021-03-28 PROCEDURE — 85027 COMPLETE CBC AUTOMATED: CPT

## 2021-03-28 PROCEDURE — 99254 IP/OBS CNSLTJ NEW/EST MOD 60: CPT | Performed by: INTERNAL MEDICINE

## 2021-03-28 PROCEDURE — 36600 WITHDRAWAL OF ARTERIAL BLOOD: CPT

## 2021-03-28 PROCEDURE — 84484 ASSAY OF TROPONIN QUANT: CPT

## 2021-03-28 PROCEDURE — 99291 CRITICAL CARE FIRST HOUR: CPT | Performed by: HOSPITALIST

## 2021-03-28 PROCEDURE — 700111 HCHG RX REV CODE 636 W/ 250 OVERRIDE (IP): Performed by: INTERNAL MEDICINE

## 2021-03-28 PROCEDURE — P9034 PLATELETS, PHERESIS: HCPCS

## 2021-03-28 PROCEDURE — 85055 RETICULATED PLATELET ASSAY: CPT

## 2021-03-28 PROCEDURE — 85730 THROMBOPLASTIN TIME PARTIAL: CPT

## 2021-03-28 PROCEDURE — 36430 TRANSFUSION BLD/BLD COMPNT: CPT

## 2021-03-28 PROCEDURE — 99232 SBSQ HOSP IP/OBS MODERATE 35: CPT | Performed by: INTERNAL MEDICINE

## 2021-03-28 PROCEDURE — 700111 HCHG RX REV CODE 636 W/ 250 OVERRIDE (IP): Performed by: HOSPITALIST

## 2021-03-28 PROCEDURE — A9270 NON-COVERED ITEM OR SERVICE: HCPCS | Performed by: NURSE PRACTITIONER

## 2021-03-28 PROCEDURE — 99291 CRITICAL CARE FIRST HOUR: CPT | Performed by: INTERNAL MEDICINE

## 2021-03-28 PROCEDURE — 80074 ACUTE HEPATITIS PANEL: CPT

## 2021-03-28 PROCEDURE — 6A550Z2 PHERESIS OF PLATELETS, SINGLE: ICD-10-PCS | Performed by: INTERNAL MEDICINE

## 2021-03-28 PROCEDURE — 770022 HCHG ROOM/CARE - ICU (200)

## 2021-03-28 PROCEDURE — 80048 BASIC METABOLIC PNL TOTAL CA: CPT

## 2021-03-28 PROCEDURE — 700102 HCHG RX REV CODE 250 W/ 637 OVERRIDE(OP): Performed by: NURSE PRACTITIONER

## 2021-03-28 PROCEDURE — 90935 HEMODIALYSIS ONE EVALUATION: CPT

## 2021-03-28 PROCEDURE — 700111 HCHG RX REV CODE 636 W/ 250 OVERRIDE (IP)

## 2021-03-28 PROCEDURE — 94660 CPAP INITIATION&MGMT: CPT

## 2021-03-28 PROCEDURE — 96376 TX/PRO/DX INJ SAME DRUG ADON: CPT

## 2021-03-28 PROCEDURE — 700101 HCHG RX REV CODE 250

## 2021-03-28 PROCEDURE — 82803 BLOOD GASES ANY COMBINATION: CPT

## 2021-03-28 PROCEDURE — 700105 HCHG RX REV CODE 258: Performed by: HOSPITALIST

## 2021-03-28 RX ORDER — HYDROMORPHONE HYDROCHLORIDE 1 MG/ML
0.5 INJECTION, SOLUTION INTRAMUSCULAR; INTRAVENOUS; SUBCUTANEOUS ONCE
Status: COMPLETED | OUTPATIENT
Start: 2021-03-28 | End: 2021-03-28

## 2021-03-28 RX ORDER — LORAZEPAM 2 MG/ML
1 INJECTION INTRAMUSCULAR EVERY 6 HOURS PRN
Status: DISCONTINUED | OUTPATIENT
Start: 2021-03-28 | End: 2021-03-30 | Stop reason: HOSPADM

## 2021-03-28 RX ORDER — CLONIDINE HYDROCHLORIDE 0.1 MG/1
0.1 TABLET ORAL 4 TIMES DAILY PRN
Status: DISCONTINUED | OUTPATIENT
Start: 2021-03-28 | End: 2021-03-30 | Stop reason: HOSPADM

## 2021-03-28 RX ORDER — FUROSEMIDE 10 MG/ML
INJECTION INTRAMUSCULAR; INTRAVENOUS
Status: ACTIVE
Start: 2021-03-28 | End: 2021-03-29

## 2021-03-28 RX ORDER — DIPHENHYDRAMINE HYDROCHLORIDE 50 MG/ML
25 INJECTION INTRAMUSCULAR; INTRAVENOUS ONCE
Status: COMPLETED | OUTPATIENT
Start: 2021-03-28 | End: 2021-03-28

## 2021-03-28 RX ORDER — DIPHENHYDRAMINE HCL 25 MG
25 TABLET ORAL EVERY 6 HOURS PRN
Status: DISCONTINUED | OUTPATIENT
Start: 2021-03-28 | End: 2021-03-29

## 2021-03-28 RX ORDER — SODIUM CHLORIDE 9 MG/ML
INJECTION, SOLUTION INTRAVENOUS CONTINUOUS
Status: DISCONTINUED | OUTPATIENT
Start: 2021-03-28 | End: 2021-03-28

## 2021-03-28 RX ORDER — LEVOFLOXACIN 5 MG/ML
500 INJECTION, SOLUTION INTRAVENOUS EVERY 24 HOURS
Status: COMPLETED | OUTPATIENT
Start: 2021-03-29 | End: 2021-03-29

## 2021-03-28 RX ORDER — FUROSEMIDE 10 MG/ML
100 INJECTION INTRAMUSCULAR; INTRAVENOUS ONCE
Status: COMPLETED | OUTPATIENT
Start: 2021-03-28 | End: 2021-03-28

## 2021-03-28 RX ORDER — METHYLPREDNISOLONE SODIUM SUCCINATE 500 MG/8ML
INJECTION INTRAMUSCULAR; INTRAVENOUS
Status: COMPLETED
Start: 2021-03-28 | End: 2021-03-28

## 2021-03-28 RX ADMIN — PREDNISONE 5 MG: 10 TABLET ORAL at 18:32

## 2021-03-28 RX ADMIN — SUCRALFATE 1 G: 1 SUSPENSION ORAL at 18:31

## 2021-03-28 RX ADMIN — PROCHLORPERAZINE EDISYLATE 10 MG: 5 INJECTION INTRAMUSCULAR; INTRAVENOUS at 01:35

## 2021-03-28 RX ADMIN — AMLODIPINE BESYLATE 10 MG: 5 TABLET ORAL at 18:31

## 2021-03-28 RX ADMIN — ONDANSETRON 4 MG: 2 INJECTION INTRAMUSCULAR; INTRAVENOUS at 06:26

## 2021-03-28 RX ADMIN — CARVEDILOL 25 MG: 25 TABLET, FILM COATED ORAL at 06:47

## 2021-03-28 RX ADMIN — MYCOPHENOLIC ACID 180 MG: 180 TABLET, DELAYED RELEASE ORAL at 18:00

## 2021-03-28 RX ADMIN — SODIUM CHLORIDE 1000 MG: 9 INJECTION, SOLUTION INTRAVENOUS at 21:33

## 2021-03-28 RX ADMIN — HYDROMORPHONE HYDROCHLORIDE 0.5 MG: 1 INJECTION, SOLUTION INTRAMUSCULAR; INTRAVENOUS; SUBCUTANEOUS at 16:36

## 2021-03-28 RX ADMIN — FUROSEMIDE 40 MG: 40 TABLET ORAL at 06:00

## 2021-03-28 RX ADMIN — HYDROMORPHONE HYDROCHLORIDE 0.5 MG: 1 INJECTION, SOLUTION INTRAMUSCULAR; INTRAVENOUS; SUBCUTANEOUS at 01:35

## 2021-03-28 RX ADMIN — CLONIDINE HYDROCHLORIDE 0.1 MG: 0.1 TABLET ORAL at 21:31

## 2021-03-28 RX ADMIN — VANCOMYCIN HYDROCHLORIDE 1500 MG: 500 INJECTION, POWDER, LYOPHILIZED, FOR SOLUTION INTRAVENOUS at 22:18

## 2021-03-28 RX ADMIN — HYDROMORPHONE HYDROCHLORIDE 0.5 MG: 1 INJECTION, SOLUTION INTRAMUSCULAR; INTRAVENOUS; SUBCUTANEOUS at 02:27

## 2021-03-28 RX ADMIN — HYDROMORPHONE HYDROCHLORIDE 0.5 MG: 1 INJECTION, SOLUTION INTRAMUSCULAR; INTRAVENOUS; SUBCUTANEOUS at 22:01

## 2021-03-28 RX ADMIN — CARVEDILOL 25 MG: 25 TABLET, FILM COATED ORAL at 18:32

## 2021-03-28 RX ADMIN — ONDANSETRON 4 MG: 2 INJECTION INTRAMUSCULAR; INTRAVENOUS at 22:01

## 2021-03-28 RX ADMIN — FUROSEMIDE 100 MG: 10 INJECTION, SOLUTION INTRAMUSCULAR; INTRAVENOUS at 20:13

## 2021-03-28 RX ADMIN — DIPHENHYDRAMINE HYDROCHLORIDE 25 MG: 50 INJECTION, SOLUTION INTRAMUSCULAR; INTRAVENOUS at 12:03

## 2021-03-28 RX ADMIN — OMEPRAZOLE 40 MG: 20 CAPSULE, DELAYED RELEASE ORAL at 18:32

## 2021-03-28 RX ADMIN — FUROSEMIDE 40 MG: 40 TABLET ORAL at 18:31

## 2021-03-28 RX ADMIN — SODIUM CHLORIDE: 9 INJECTION, SOLUTION INTRAVENOUS at 12:27

## 2021-03-28 RX ADMIN — ONDANSETRON 4 MG: 2 INJECTION INTRAMUSCULAR; INTRAVENOUS at 16:36

## 2021-03-28 RX ADMIN — SUCRALFATE 1 G: 1 SUSPENSION ORAL at 12:27

## 2021-03-28 RX ADMIN — LORAZEPAM 1 MG: 2 INJECTION INTRAMUSCULAR; INTRAVENOUS at 21:51

## 2021-03-28 RX ADMIN — HYDROMORPHONE HYDROCHLORIDE 0.5 MG: 1 INJECTION, SOLUTION INTRAMUSCULAR; INTRAVENOUS; SUBCUTANEOUS at 06:27

## 2021-03-28 RX ADMIN — HYDROXYCHLOROQUINE SULFATE 200 MG: 200 TABLET, FILM COATED ORAL at 18:31

## 2021-03-28 ASSESSMENT — ENCOUNTER SYMPTOMS
CLAUDICATION: 0
COUGH: 1
CHILLS: 0
NAUSEA: 1
BACK PAIN: 0
ABDOMINAL PAIN: 1
DIARRHEA: 0
BLURRED VISION: 0
SPUTUM PRODUCTION: 0
HEMOPTYSIS: 1
DIZZINESS: 0
PALPITATIONS: 0
SHORTNESS OF BREATH: 1
ABDOMINAL PAIN: 0
FEVER: 0
DEPRESSION: 0
SORE THROAT: 0
COUGH: 0
BLOOD IN STOOL: 0
SPUTUM PRODUCTION: 1
HEADACHES: 0
VOMITING: 1
NECK PAIN: 0
WEIGHT LOSS: 0
ORTHOPNEA: 1
MYALGIAS: 0

## 2021-03-28 ASSESSMENT — PAIN DESCRIPTION - PAIN TYPE
TYPE: ACUTE PAIN

## 2021-03-28 ASSESSMENT — FIBROSIS 4 INDEX: FIB4 SCORE: 2.01

## 2021-03-28 ASSESSMENT — COGNITIVE AND FUNCTIONAL STATUS - GENERAL
SUGGESTED CMS G CODE MODIFIER DAILY ACTIVITY: CI
DAILY ACTIVITIY SCORE: 23
SUGGESTED CMS G CODE MODIFIER MOBILITY: CI
DRESSING REGULAR LOWER BODY CLOTHING: A LITTLE
CLIMB 3 TO 5 STEPS WITH RAILING: A LITTLE
MOBILITY SCORE: 23

## 2021-03-28 ASSESSMENT — PAIN SCALES - WONG BAKER
WONGBAKER_NUMERICALRESPONSE: HURTS JUST A LITTLE BIT

## 2021-03-28 ASSESSMENT — PULMONARY FUNCTION TESTS: EPAP_CMH2O: 5

## 2021-03-28 NOTE — PROGRESS NOTES
Informed Dr. Madison of patient increasing pain in abdomen. Patient requesting a strong medication. Also asked if can give compazine iv for nausea because zofran not due yet and patient is NPO.   Doctor states she will add an additional one time dose of diluadid and states it is okay to give compazine

## 2021-03-28 NOTE — PROGRESS NOTES
Received bedside report and assumed care of patient. Patient is A&Ox4 and awake in bed. Patient showing no signs of distress, complaints of abdominal pain, and is on 3L/nasal cannula. Fall precautions are in place. Mother at bedside. Call light within reach, bed in low position, 2 side rails up, and belongings are within reach.  Patient was updated on Plan of Care, no questions or concerns. Pt educated on use of call light for assistance. Will continue to monitor.

## 2021-03-28 NOTE — PROGRESS NOTES
Gastroenterology Progress Note     Author: Skinny Zheng M.D.   Date & Time Created: 3/28/2021 4:25 PM    Chief Complaint:  This is a very pleasant 25 y.o. female with the past medical history SLE, ESRD on HD (MWF), acute on chronic anemia, GERD, HTN, hemorrhagic gastritis, who presented to the ER 3/26/21 with 2 episodes of hematemesis, worsening epigastric abdominal pain.  ER labs Hgb: 9.1, repeat 8.3. Creatinine: 3.32. Denies fevers, chills, coffee ground emesis, melena.  No further episodes of hematemesis since admission.  Complaints of nausea without vomiting, epigastric pain, nonradiating.     PERTINENT GI HISTORY:     History of multiple EGD's due to GI bleed: [facility] gastro[facility].  She had an outpatient appointment with Dr. Kam February 2, 2021.  According to his notes, chronic GI blood loss secondary to hemorrhagic gastritis felt to be related to kidney disease.  Multiple endoscopies over the past year-APC in recently RFA of areas of the cardia and antrum where diffuse oozing has been noted without other discrete lesions.  She has a referral to [facility] gastroenterology for further evaluation.  Recommendations for PPI twice a day, sucralfate 4 times daily, thalidomide 100 mg daily.     EGD 3/19/21 performed by Dr. Mcqueen: Oozing from several small, puncate lesions in the stomach (predominatly antral but a few in the proximal stomach), in the setting of diffuse nodular gatsritis.  Karen Keyes Tear with adherent hemoclip (placed about ten days ago) noted just distal to the GE junction (no bleeding from this area).  Some retained food in the dependent portion of the stomach and the duodenum.  Duodenum unremarkable.  Intermittent bile flowing per ampulla. Argon Plasma Coagulation (APC) utilized to ablate the multiple small, presumed AVMs in the antrum and cardia of the stomach.      EGD 3/5/21 performed by Dr. Silva: Esophagus: Karen Keyes tear just distal to  gastroesophageal junction, posterior wall, injected with 5mL of epinephrine and hemoclip applied. Stomach: retained food suggestive of gastroparesis, non-erosive gastritis biopsied to evaluate H.pylori status. Duodenum: endoscopically unremarkable to 2nd portion, duodenal biopsies obtained to evaluate for celiac sprue.     Colonoscopy January 2021 no obvious bleeding lesions.    Interval History:    3/28: EGD canceled due to increased infiltrates on CXR and O2 requirements now up to 4.5L at bedside.    She states she coughed multiple times and coughed up blood. Then she vomited twice without blood her food contents.    Still has epigastric pain; radiates inferiorly    Review of Systems:  ROS  Review of Systems   Constitutional: Negative for chills, fever and weight loss.   HENT: Negative for nosebleeds and sore throat.    Eyes: Positive for double vision. Negative for blurred vision.   Respiratory: Negative for shortness of breath and wheezing.    Cardiovascular: Negative for chest pain and palpitations.   Gastrointestinal: Positive for abdominal pain, heartburn, nausea and vomiting. Negative for blood in stool and melena.   Musculoskeletal: Negative.    Skin: Negative for itching and rash.   Neurological: Positive for weakness. Negative for dizziness and headaches.   Psychiatric/Behavioral: Negative.    Physical Exam:  Physical Exam  Constitutional:       Appearance: Normal appearance.      Comments: Right AV fistula upper arm   HENT:      Head: Normocephalic and atraumatic.      Mouth/Throat:      Mouth: Mucous membranes are moist.      Pharynx: Oropharynx is clear.   Eyes:      Extraocular Movements: Extraocular movements intact.      Pupils: Pupils are equal, round, and reactive to light.   Cardiovascular:      Rate and Rhythm: Normal rate and regular rhythm.   Pulmonary:      Effort: Pulmonary effort is normal.      Breath sounds: Normal breath sounds.   Abdominal:      General: Abdomen is flat. Bowel sounds  are normal. There is no distension.      Palpations: Abdomen is soft.      Tenderness: ttp epigastrically  Musculoskeletal:         General: Normal range of motion.      Cervical back: Normal range of motion and neck supple.   Skin:     General: Skin is warm and dry.   Neurological:      General: No focal deficit present.      Mental Status: She is alert and oriented to person, place, and time.   Labs:          Recent Labs     21   SODIUM 129* 129*   POTASSIUM 4.0 4.4   CHLORIDE 90* 91*   CO2 34* 30   BUN 19 38*   CREATININE 3.32* 5.04*   CALCIUM 9.1 8.7     Recent Labs     21   ALTSGPT 7  --    ASTSGOT 18  --    ALKPHOSPHAT 67  --    TBILIRUBIN 0.6  --    LIPASE 33  --    GLUCOSE 93 83     Recent Labs     21  0346 21  1015 21  1513 21  0320 21  0802   RBC 3.48*  --  3.14*  --   --   --  3.09*  --    HEMOGLOBIN 9.1*   < > 8.3*   < > 8.4* 8.6* 8.1*  --    HEMATOCRIT 30.8*   < > 28.0*   < > 28.7* 29.9* 27.8*  --    PLATELETCT 49*  --  36*  --   --   --  31*  --    PROTHROMBTM 14.4  --   --   --   --   --   --   --    APTT 30.1  --   --   --   --   --   --  27.1   INR 1.15*  --   --   --   --   --   --   --     < > = values in this interval not displayed.     Recent Labs     21  032   WBC 6.7 6.2 3.9*   NEUTSPOLYS 84.60*  --   --    LYMPHOCYTES 10.20*  --   --    MONOCYTES 3.90  --   --    EOSINOPHILS 0.30  --   --    BASOPHILS 0.60  --   --    ASTSGOT 18  --   --    ALTSGPT 7  --   --    ALKPHOSPHAT 67  --   --    TBILIRUBIN 0.6  --   --      Hemodynamics:  Temp (24hrs), Av °C (98.6 °F), Min:36.5 °C (97.7 °F), Max:37.3 °C (99.1 °F)  Temperature: 37.2 °C (99 °F)  Pulse  Av.8  Min: 60  Max: 138   Blood Pressure: (!) 168/98     Respiratory:    Respiration: 18, Pulse Oximetry: 96 %     Work Of Breathing / Effort: Within Normal Limits  RUL Breath  Sounds: Clear, RML Breath Sounds: Clear, RLL Breath Sounds: Clear, PALLAVI Breath Sounds: Clear, LLL Breath Sounds: Clear  Fluids:    Intake/Output Summary (Last 24 hours) at 3/28/2021 1625  Last data filed at 3/28/2021 1400  Gross per 24 hour   Intake 1371 ml   Output 51 ml   Net 1320 ml        GI/Nutrition:  Orders Placed This Encounter   Procedures   • Diet Order Diet: Renal     Standing Status:   Standing     Number of Occurrences:   1     Order Specific Question:   Diet:     Answer:   Renal [8]     Medical Decision Making, by Problem:  Active Hospital Problems    Diagnosis    • *Upper GI bleed [K92.2]    • Anemia associated with acute blood loss [D62]    • Pulmonary edema [J81.1]    • ESRD (end stage renal disease) on dialysis (HCC) [N18.6, Z99.2]    • Elevated INR [R79.1]    • Elevated troponin [R77.8]    • Lupus (HCC) [M32.9]    • Immunosuppression (HCC) [D84.9]    • Chronic respiratory failure with hypoxia (HCC) [J96.11]        Plan:  Hemoptysis: clarified by patient  Hx of Hemorrhagic Gastritis   Likely due to hemorrhagic gastritis that is oozing.  Also, could be from the Karen-Keyes tear that was identified on 3/19/2021.  GERD-likely due to hemorrhagic gastritis.  She does well on PPI twice a day.  Complaints of epigastric pain.  acute on chronic anemia: Likely due to hemorrhagic gastritis and ESRD.  Patient receives blood transfusions every other week.  Current Hgb; 8.4.  Has not needed blood transfusions during this admission.   ESRD-on hemodialysis (MWF)   Elevated troponin.  Appears to be chronic-3 months ago troponins 785.  1 month ago-troponin 931.  Current, 825.   Covid-negative   Acute on Chronic hypoxic respiratory failure    -consider blood and respiratory cultures.   -HD will help mitigate pulm edema  -defer EGD until O2 requirements diminish  -Start Protonix 40mg po BID  -Continue Sucralfate 1gm QiD before meals  -May consider Thalidomide 100mg daily (per Dr. Kam's recommendations  2/2/21)    I spent more than 55 minutes in assessment, management, and care with the patient and/or family.      Skinny Zheng MD, MEd  [facility]      Quality-Core Measures

## 2021-03-28 NOTE — PROGRESS NOTES
Halima from Lab called with critical result of Platelet of 31 at 0502. Critical lab result read back to Halima.   Dr. Chase notified of critical lab result at 0506.  Critical lab result read back by Dr. Chase.

## 2021-03-28 NOTE — CARE PLAN
Problem: Bowel/Gastric:  Goal: Normal bowel function is maintained or improved  Outcome: PROGRESSING AS EXPECTED   Patient states last bowel movement did not have any blood.    Problem: Pain Management  Goal: Pain level will decrease to patient's comfort goal  Outcome: PROGRESSING AS EXPECTED   Patient continues to complain of pain in abdomen.

## 2021-03-28 NOTE — PROGRESS NOTES
[facility] Medicine Daily Progress Note    Date of Service  3/28/2021    Chief Complaint  25 y.o. female admitted 3/27/2021 with hematemesis.     Interval Problem Update  Patient was seen and examined at bedside.  No acute events overnight. Patient is resting comfortably in bed and in no acute distress. Reports occurrence of vomiting with streaks of blood this AM.     GI no longer on planning scope  Transfuse 1U Platelet   Hb 9.7 to 8.1 over last 7 days  Monitor Hb over next 24 hours   PPI BID  HD in AM    Consultants/Specialty  Nephro  GI    Code Status  Full Code    Disposition  Likely home in AM after HD    Review of Systems  Review of Systems   Constitutional: Negative for chills, fever and malaise/fatigue.   HENT: Negative for congestion and sore throat.    Eyes: Negative for blurred vision.   Respiratory: Negative for cough and sputum production.    Cardiovascular: Negative for chest pain and palpitations.   Gastrointestinal: Positive for nausea and vomiting. Negative for abdominal pain, blood in stool, diarrhea and melena.   Genitourinary: Negative for dysuria and frequency.   Musculoskeletal: Negative for back pain and myalgias.   Skin: Positive for itching.   Neurological: Negative for dizziness and headaches.   Psychiatric/Behavioral: Negative for depression.        Physical Exam  Temp:  [36.5 °C (97.7 °F)-37.3 °C (99.1 °F)] 37.1 °C (98.8 °F)  Pulse:  [] 100  Resp:  [16-20] 18  BP: (148-191)/(100-133) 165/100  SpO2:  [94 %-100 %] 96 %    Physical Exam  Constitutional:       General: She is not in acute distress.     Appearance: She is normal weight. She is ill-appearing. She is not toxic-appearing.   HENT:      Head: Normocephalic.      Right Ear: External ear normal.      Left Ear: External ear normal.      Nose: No congestion.      Mouth/Throat:      Pharynx: No oropharyngeal exudate.   Eyes:      Extraocular Movements: Extraocular movements intact.      Conjunctiva/sclera: Conjunctivae normal.       Pupils: Pupils are equal, round, and reactive to light.   Cardiovascular:      Rate and Rhythm: Normal rate.      Heart sounds: No murmur.   Pulmonary:      Effort: Pulmonary effort is normal. No respiratory distress.      Breath sounds: No wheezing.   Abdominal:      Palpations: Abdomen is soft.      Tenderness: There is no abdominal tenderness. There is no guarding or rebound.   Musculoskeletal:         General: No swelling or deformity.      Comments: Right AV fistula with good thrill and bruit   Skin:     Capillary Refill: Capillary refill takes less than 2 seconds.      Coloration: Skin is not jaundiced.      Findings: No bruising.      Comments: Chronic skin changes with areas of hypopigmentation and hyperpigmentation   Neurological:      General: No focal deficit present.      Mental Status: She is alert and oriented to person, place, and time. Mental status is at baseline.      Cranial Nerves: No cranial nerve deficit.   Psychiatric:         Mood and Affect: Mood normal.         Behavior: Behavior normal.         Thought Content: Thought content normal.         Judgment: Judgment normal.         Fluids    Intake/Output Summary (Last 24 hours) at 3/28/2021 1212  Last data filed at 3/28/2021 1144  Gross per 24 hour   Intake 723 ml   Output 51 ml   Net 672 ml       Laboratory  Recent Labs     03/27/21  0155 03/27/21  0155 03/27/21  0346 03/27/21  0346 03/27/21  1015 03/27/21  1513 03/28/21  0320   WBC 6.7  --  6.2  --   --   --  3.9*   RBC 3.48*  --  3.14*  --   --   --  3.09*   HEMOGLOBIN 9.1*   < > 8.3*   < > 8.4* 8.6* 8.1*   HEMATOCRIT 30.8*   < > 28.0*   < > 28.7* 29.9* 27.8*   MCV 88.5  --  89.2  --   --   --  90.0   MCH 26.1*  --  26.4*  --   --   --  26.2*   MCHC 29.5*  --  29.6*  --   --   --  29.1*   RDW 61.9*  --  62.8*  --   --   --  62.0*   PLATELETCT 49*  --  36*  --   --   --  31*    < > = values in this interval not displayed.     Recent Labs     03/27/21  0155 03/28/21  0320   SODIUM 129* 129*    POTASSIUM 4.0 4.4   CHLORIDE 90* 91*   CO2 34* 30   GLUCOSE 93 83   BUN 19 38*   CREATININE 3.32* 5.04*   CALCIUM 9.1 8.7     Recent Labs     03/27/21  0155 03/28/21  0802   APTT 30.1 27.1   INR 1.15*  --                Imaging  CT-RENAL COLIC EVALUATION(A/P W/O)   Final Result      1.  Lung bases show interstitial and extensive alveolar opacities as well as small effusions, likely pulmonary edema.  Superimposed pneumonia is not excluded.   2.  Atrophic kidneys.   3.  No CT evidence for appendicitis.   4.  Focally dilated small bowel loop in LEFT lower quadrant of uncertain significance.  No definite bowel obstruction.   5.  Periumbilical subcutaneous edema of uncertain significance.      DX-CHEST-PORTABLE (1 VIEW)   Final Result      1.  Worsening bilateral interstitial and alveolar opacities, edema versus pneumonia.   2.  Minimal bilateral pleural effusions, increased from prior.   3.  No pneumoperitoneum demonstrated.           Assessment/Plan  * Upper GI bleed- (present on admission)  Assessment & Plan  -Patient with 2 prior admissions with upper GI bleed in March, had 2 EGDs done (Dr. Mcqueen).  Has Karen-Keyes tear and her most recent EGD showed oozing from several small puncture lesions in the stomach with diffuse nodular gastritis requiring argon plasma coagulation.  - Hb 9.7 to 8.1 over last 7 days  - monitor Hb over next 24 hours  - PPI BID  - GI consulted      Anemia associated with acute blood loss  Assessment & Plan  -Plan as above.    Pulmonary edema  Assessment & Plan  -Chest x-ray showing worsening bilateral interstitial and alveolar opacities, edema versus pneumonia.  -Closely monitor her respiratory status as she is also tachypneic, may need to have a discussion with nephrology about need of earlier hemodialysis session.  At this time though, will monitor hemoglobin considering that if she does need transfusion will be better to coordinate with hemodialysis at that time as well.    ESRD (end  stage renal disease) on dialysis ([facility])- (present on admission)  Assessment & Plan  -She is on hemodialysis on Monday, Wednesdays and Friday and her nephrologist is Dr. Trent.  She has been compliant with her therapy and on admission creatinine is 3.32 normal potassium levels.  - Nephro consulted for HD in AM    Immunosuppression ([facility])  Assessment & Plan  -On mycophenolate and prednisone due to underlying lupus     Chronic respiratory failure with hypoxia ([facility])  Assessment & Plan  CXR demonstrates pulmoary edema  On 4L chronically    Elevated INR  Assessment & Plan  -Chronically elevated, today 1.15.  Close monitoring.  Liver panel within normal limits.    Elevated troponin- (present on admission)  Assessment & Plan  -Troponin today is 825 but she has chronically elevated troponin, on prior admission it was 931 therefore attributing this to demand ischemia and underlying renal disease.  She is not complaining of chest pain at this time.    Lupus ([facility])- (present on admission)  Assessment & Plan  -Continue mycophenolate, prednisone and Plaquenil.  She also reports recently starting a type of vaccine for lupus but was not sure about more details.       VTE prophylaxis: SCD

## 2021-03-29 ENCOUNTER — APPOINTMENT (OUTPATIENT)
Dept: CARDIOLOGY | Facility: MEDICAL CENTER | Age: 26
DRG: 189 | End: 2021-03-29
Attending: HOSPITALIST
Payer: COMMERCIAL

## 2021-03-29 ENCOUNTER — APPOINTMENT (OUTPATIENT)
Dept: RADIOLOGY | Facility: MEDICAL CENTER | Age: 26
DRG: 189 | End: 2021-03-29
Attending: INTERNAL MEDICINE
Payer: COMMERCIAL

## 2021-03-29 LAB
ALBUMIN SERPL BCP-MCNC: 2.9 G/DL (ref 3.2–4.9)
ALBUMIN SERPL BCP-MCNC: 3.4 G/DL (ref 3.2–4.9)
ALBUMIN/GLOB SERPL: 0.6 G/DL
ALP SERPL-CCNC: 54 U/L (ref 30–99)
ALT SERPL-CCNC: 6 U/L (ref 2–50)
ANION GAP SERPL CALC-SCNC: 10 MMOL/L (ref 7–16)
AST SERPL-CCNC: 14 U/L (ref 12–45)
BASOPHILS # BLD AUTO: 0.1 % (ref 0–1.8)
BASOPHILS # BLD: 0.01 K/UL (ref 0–0.12)
BILIRUB SERPL-MCNC: 0.6 MG/DL (ref 0.1–1.5)
BUN SERPL-MCNC: 27 MG/DL (ref 8–22)
BUN SERPL-MCNC: 34 MG/DL (ref 8–22)
CALCIUM SERPL-MCNC: 8.9 MG/DL (ref 8.4–10.2)
CALCIUM SERPL-MCNC: 9.2 MG/DL (ref 8.4–10.2)
CHLORIDE SERPL-SCNC: 93 MMOL/L (ref 96–112)
CHLORIDE SERPL-SCNC: 93 MMOL/L (ref 96–112)
CO2 SERPL-SCNC: 28 MMOL/L (ref 20–33)
CO2 SERPL-SCNC: 30 MMOL/L (ref 20–33)
COMMENT 1642: NORMAL
CREAT SERPL-MCNC: 3.05 MG/DL (ref 0.5–1.4)
CREAT SERPL-MCNC: 3.8 MG/DL (ref 0.5–1.4)
CRP SERPL HS-MCNC: 8.03 MG/DL (ref 0–0.75)
EKG IMPRESSION: NORMAL
EOSINOPHIL # BLD AUTO: 0.01 K/UL (ref 0–0.51)
EOSINOPHIL NFR BLD: 0.1 % (ref 0–6.9)
ERYTHROCYTE [DISTWIDTH] IN BLOOD BY AUTOMATED COUNT: 60 FL (ref 35.9–50)
ERYTHROCYTE [SEDIMENTATION RATE] IN BLOOD BY WESTERGREN METHOD: 105 MM/HOUR (ref 0–20)
FIBRINOGEN PPP-MCNC: 618 MG/DL (ref 215–460)
GLOBULIN SER CALC-MCNC: 5 G/DL (ref 1.9–3.5)
GLUCOSE SERPL-MCNC: 106 MG/DL (ref 65–99)
GLUCOSE SERPL-MCNC: 127 MG/DL (ref 65–99)
HCT VFR BLD AUTO: 30.1 % (ref 37–47)
HGB BLD-MCNC: 9.1 G/DL (ref 12–16)
HYPOCHROMIA BLD QL SMEAR: ABNORMAL
IMM GRANULOCYTES # BLD AUTO: 0.02 K/UL (ref 0–0.11)
IMM GRANULOCYTES NFR BLD AUTO: 0.2 % (ref 0–0.9)
INR PPP: 1.07 (ref 0.87–1.13)
LV EJECT FRACT  99904: 45
LV EJECT FRACT MOD 2C 99903: 35.41
LV EJECT FRACT MOD 4C 99902: 44.57
LV EJECT FRACT MOD BP 99901: 43.16
LYMPHOCYTES # BLD AUTO: 0.27 K/UL (ref 1–4.8)
LYMPHOCYTES NFR BLD: 2.5 % (ref 22–41)
MAGNESIUM SERPL-MCNC: 1.8 MG/DL (ref 1.5–2.5)
MCH RBC QN AUTO: 26.4 PG (ref 27–33)
MCHC RBC AUTO-ENTMCNC: 30.2 G/DL (ref 33.6–35)
MCV RBC AUTO: 87.2 FL (ref 81.4–97.8)
MONOCYTES # BLD AUTO: 0.1 K/UL (ref 0–0.85)
MONOCYTES NFR BLD AUTO: 0.9 % (ref 0–13.4)
MRSA DNA SPEC QL NAA+PROBE: NORMAL
NEUTROPHILS # BLD AUTO: 10.44 K/UL (ref 2–7.15)
NEUTROPHILS NFR BLD: 96.2 % (ref 44–72)
NRBC # BLD AUTO: 0 K/UL
NRBC BLD-RTO: 0 /100 WBC
PHOSPHATE SERPL-MCNC: 3.1 MG/DL (ref 2.5–4.5)
PLATELET # BLD AUTO: 43 K/UL (ref 164–446)
PLATELET BLD QL SMEAR: NORMAL
PLATELETS.RETICULATED NFR BLD AUTO: 15.4 K/UL (ref 0.6–13.1)
POLYCHROMASIA BLD QL SMEAR: NORMAL
POTASSIUM SERPL-SCNC: 4.1 MMOL/L (ref 3.6–5.5)
POTASSIUM SERPL-SCNC: 4.4 MMOL/L (ref 3.6–5.5)
PROCALCITONIN SERPL-MCNC: 2.74 NG/ML
PROT SERPL-MCNC: 7.9 G/DL (ref 6–8.2)
PROTHROMBIN TIME: 13.6 SEC (ref 12–14.6)
RBC # BLD AUTO: 3.45 M/UL (ref 4.2–5.4)
RBC BLD AUTO: PRESENT
SIGNIFICANT IND 70042: NORMAL
SITE SITE: NORMAL
SODIUM SERPL-SCNC: 132 MMOL/L (ref 135–145)
SODIUM SERPL-SCNC: 133 MMOL/L (ref 135–145)
SOURCE SOURCE: NORMAL
TROPONIN T SERPL-MCNC: 739 NG/L (ref 6–19)
VANCOMYCIN TROUGH SERPL-MCNC: 8.4 UG/ML (ref 10–20)
WBC # BLD AUTO: 10.9 K/UL (ref 4.8–10.8)

## 2021-03-29 PROCEDURE — A9270 NON-COVERED ITEM OR SERVICE: HCPCS | Performed by: NURSE PRACTITIONER

## 2021-03-29 PROCEDURE — 700102 HCHG RX REV CODE 250 W/ 637 OVERRIDE(OP): Performed by: HOSPITALIST

## 2021-03-29 PROCEDURE — 700111 HCHG RX REV CODE 636 W/ 250 OVERRIDE (IP): Performed by: HOSPITALIST

## 2021-03-29 PROCEDURE — A9270 NON-COVERED ITEM OR SERVICE: HCPCS | Performed by: HOSPITALIST

## 2021-03-29 PROCEDURE — 87449 NOS EACH ORGANISM AG IA: CPT

## 2021-03-29 PROCEDURE — 85610 PROTHROMBIN TIME: CPT

## 2021-03-29 PROCEDURE — 90935 HEMODIALYSIS ONE EVALUATION: CPT | Performed by: INTERNAL MEDICINE

## 2021-03-29 PROCEDURE — 700105 HCHG RX REV CODE 258: Performed by: INTERNAL MEDICINE

## 2021-03-29 PROCEDURE — 83735 ASSAY OF MAGNESIUM: CPT

## 2021-03-29 PROCEDURE — 86140 C-REACTIVE PROTEIN: CPT

## 2021-03-29 PROCEDURE — 90935 HEMODIALYSIS ONE EVALUATION: CPT

## 2021-03-29 PROCEDURE — 700102 HCHG RX REV CODE 250 W/ 637 OVERRIDE(OP): Performed by: INTERNAL MEDICINE

## 2021-03-29 PROCEDURE — 93325 DOPPLER ECHO COLOR FLOW MAPG: CPT

## 2021-03-29 PROCEDURE — 80202 ASSAY OF VANCOMYCIN: CPT

## 2021-03-29 PROCEDURE — 80069 RENAL FUNCTION PANEL: CPT

## 2021-03-29 PROCEDURE — 5A1D70Z PERFORMANCE OF URINARY FILTRATION, INTERMITTENT, LESS THAN 6 HOURS PER DAY: ICD-10-PCS | Performed by: INTERNAL MEDICINE

## 2021-03-29 PROCEDURE — 93321 DOPPLER ECHO F-UP/LMTD STD: CPT | Mod: 26 | Performed by: INTERNAL MEDICINE

## 2021-03-29 PROCEDURE — 86225 DNA ANTIBODY NATIVE: CPT

## 2021-03-29 PROCEDURE — 93010 ELECTROCARDIOGRAM REPORT: CPT | Performed by: INTERNAL MEDICINE

## 2021-03-29 PROCEDURE — 84484 ASSAY OF TROPONIN QUANT: CPT

## 2021-03-29 PROCEDURE — 700102 HCHG RX REV CODE 250 W/ 637 OVERRIDE(OP): Performed by: NURSE PRACTITIONER

## 2021-03-29 PROCEDURE — 83516 IMMUNOASSAY NONANTIBODY: CPT | Mod: 91

## 2021-03-29 PROCEDURE — 93308 TTE F-UP OR LMTD: CPT | Mod: 26 | Performed by: INTERNAL MEDICINE

## 2021-03-29 PROCEDURE — 86235 NUCLEAR ANTIGEN ANTIBODY: CPT | Mod: 91

## 2021-03-29 PROCEDURE — 700111 HCHG RX REV CODE 636 W/ 250 OVERRIDE (IP): Performed by: INTERNAL MEDICINE

## 2021-03-29 PROCEDURE — 82232 ASSAY OF BETA-2 PROTEIN: CPT

## 2021-03-29 PROCEDURE — 86039 ANTINUCLEAR ANTIBODIES (ANA): CPT

## 2021-03-29 PROCEDURE — 80053 COMPREHEN METABOLIC PANEL: CPT

## 2021-03-29 PROCEDURE — 93325 DOPPLER ECHO COLOR FLOW MAPG: CPT | Mod: 26 | Performed by: INTERNAL MEDICINE

## 2021-03-29 PROCEDURE — 71045 X-RAY EXAM CHEST 1 VIEW: CPT

## 2021-03-29 PROCEDURE — 99233 SBSQ HOSP IP/OBS HIGH 50: CPT | Performed by: INTERNAL MEDICINE

## 2021-03-29 PROCEDURE — 85055 RETICULATED PLATELET ASSAY: CPT

## 2021-03-29 PROCEDURE — 85652 RBC SED RATE AUTOMATED: CPT

## 2021-03-29 PROCEDURE — 84145 PROCALCITONIN (PCT): CPT

## 2021-03-29 PROCEDURE — 85025 COMPLETE CBC W/AUTO DIFF WBC: CPT

## 2021-03-29 PROCEDURE — 87641 MR-STAPH DNA AMP PROBE: CPT

## 2021-03-29 PROCEDURE — 700111 HCHG RX REV CODE 636 W/ 250 OVERRIDE (IP)

## 2021-03-29 PROCEDURE — 86038 ANTINUCLEAR ANTIBODIES: CPT

## 2021-03-29 PROCEDURE — A9270 NON-COVERED ITEM OR SERVICE: HCPCS | Performed by: INTERNAL MEDICINE

## 2021-03-29 PROCEDURE — 770006 HCHG ROOM/CARE - MED/SURG/GYN SEMI*

## 2021-03-29 PROCEDURE — 86215 DEOXYRIBONUCLEASE ANTIBODY: CPT

## 2021-03-29 PROCEDURE — 86160 COMPLEMENT ANTIGEN: CPT

## 2021-03-29 RX ORDER — DIPHENHYDRAMINE HYDROCHLORIDE 50 MG/ML
25 INJECTION INTRAMUSCULAR; INTRAVENOUS EVERY 6 HOURS PRN
Status: DISCONTINUED | OUTPATIENT
Start: 2021-03-29 | End: 2021-03-30 | Stop reason: HOSPADM

## 2021-03-29 RX ORDER — LEVOFLOXACIN 5 MG/ML
INJECTION, SOLUTION INTRAVENOUS
Status: COMPLETED
Start: 2021-03-29 | End: 2021-03-29

## 2021-03-29 RX ORDER — HYDROMORPHONE HYDROCHLORIDE 1 MG/ML
0.5 INJECTION, SOLUTION INTRAMUSCULAR; INTRAVENOUS; SUBCUTANEOUS EVERY 4 HOURS PRN
Status: DISCONTINUED | OUTPATIENT
Start: 2021-03-29 | End: 2021-03-30 | Stop reason: HOSPADM

## 2021-03-29 RX ORDER — OXYCODONE HYDROCHLORIDE AND ACETAMINOPHEN 5; 325 MG/1; MG/1
1 TABLET ORAL EVERY 8 HOURS PRN
Status: DISCONTINUED | OUTPATIENT
Start: 2021-03-29 | End: 2021-03-29

## 2021-03-29 RX ORDER — DESMOPRESSIN ACETATE 4 UG/ML
INJECTION, SOLUTION INTRAVENOUS; SUBCUTANEOUS
Status: ACTIVE
Start: 2021-03-29 | End: 2021-03-29

## 2021-03-29 RX ADMIN — DIPHENHYDRAMINE HYDROCHLORIDE 25 MG: 50 INJECTION, SOLUTION INTRAMUSCULAR; INTRAVENOUS at 13:23

## 2021-03-29 RX ADMIN — LORAZEPAM 1 MG: 2 INJECTION INTRAMUSCULAR; INTRAVENOUS at 04:53

## 2021-03-29 RX ADMIN — FUROSEMIDE 40 MG: 40 TABLET ORAL at 05:16

## 2021-03-29 RX ADMIN — AMLODIPINE BESYLATE 10 MG: 5 TABLET ORAL at 18:22

## 2021-03-29 RX ADMIN — SUCRALFATE 1 G: 1 SUSPENSION ORAL at 06:24

## 2021-03-29 RX ADMIN — SUCRALFATE 1 G: 1 SUSPENSION ORAL at 11:28

## 2021-03-29 RX ADMIN — CARVEDILOL 25 MG: 25 TABLET, FILM COATED ORAL at 18:23

## 2021-03-29 RX ADMIN — ACETAMINOPHEN 650 MG: 325 TABLET, FILM COATED ORAL at 13:23

## 2021-03-29 RX ADMIN — CLONIDINE HYDROCHLORIDE 0.1 MG: 0.1 TABLET ORAL at 23:46

## 2021-03-29 RX ADMIN — HYDROCORTISONE SODIUM SUCCINATE 100 MG: 100 INJECTION, POWDER, FOR SOLUTION INTRAMUSCULAR; INTRAVENOUS at 13:23

## 2021-03-29 RX ADMIN — LORAZEPAM 1 MG: 2 INJECTION INTRAMUSCULAR; INTRAVENOUS at 13:23

## 2021-03-29 RX ADMIN — DESMOPRESSIN ACETATE 18.36 MCG: 4 INJECTION INTRAVENOUS at 00:41

## 2021-03-29 RX ADMIN — OMEPRAZOLE 40 MG: 20 CAPSULE, DELAYED RELEASE ORAL at 18:23

## 2021-03-29 RX ADMIN — LEVOFLOXACIN 500 MG: 5 INJECTION, SOLUTION INTRAVENOUS at 05:27

## 2021-03-29 RX ADMIN — DIPHENHYDRAMINE HYDROCHLORIDE 25 MG: 50 INJECTION, SOLUTION INTRAMUSCULAR; INTRAVENOUS at 00:40

## 2021-03-29 RX ADMIN — LEVOFLOXACIN 500 MG: 500 INJECTION, SOLUTION INTRAVENOUS at 05:27

## 2021-03-29 RX ADMIN — PROCHLORPERAZINE EDISYLATE 10 MG: 5 INJECTION INTRAMUSCULAR; INTRAVENOUS at 18:20

## 2021-03-29 RX ADMIN — HYDROMORPHONE HYDROCHLORIDE 0.5 MG: 1 INJECTION, SOLUTION INTRAMUSCULAR; INTRAVENOUS; SUBCUTANEOUS at 08:56

## 2021-03-29 RX ADMIN — LORAZEPAM 1 MG: 2 INJECTION INTRAMUSCULAR; INTRAVENOUS at 21:01

## 2021-03-29 RX ADMIN — DIPHENHYDRAMINE HYDROCHLORIDE 25 MG: 50 INJECTION, SOLUTION INTRAMUSCULAR; INTRAVENOUS at 21:01

## 2021-03-29 RX ADMIN — FUROSEMIDE 40 MG: 40 TABLET ORAL at 18:23

## 2021-03-29 RX ADMIN — MYCOPHENOLIC ACID 180 MG: 180 TABLET, DELAYED RELEASE ORAL at 18:00

## 2021-03-29 RX ADMIN — CARVEDILOL 25 MG: 25 TABLET, FILM COATED ORAL at 07:49

## 2021-03-29 RX ADMIN — HYDROXYCHLOROQUINE SULFATE 200 MG: 200 TABLET, FILM COATED ORAL at 18:23

## 2021-03-29 RX ADMIN — SUCRALFATE 1 G: 1 SUSPENSION ORAL at 18:22

## 2021-03-29 RX ADMIN — HYDROMORPHONE HYDROCHLORIDE 0.5 MG: 1 INJECTION, SOLUTION INTRAMUSCULAR; INTRAVENOUS; SUBCUTANEOUS at 16:19

## 2021-03-29 RX ADMIN — HYDROCORTISONE SODIUM SUCCINATE 100 MG: 100 INJECTION, POWDER, FOR SOLUTION INTRAMUSCULAR; INTRAVENOUS at 21:18

## 2021-03-29 RX ADMIN — HYDROMORPHONE HYDROCHLORIDE 0.5 MG: 1 INJECTION, SOLUTION INTRAMUSCULAR; INTRAVENOUS; SUBCUTANEOUS at 02:48

## 2021-03-29 RX ADMIN — OMEPRAZOLE 40 MG: 20 CAPSULE, DELAYED RELEASE ORAL at 05:16

## 2021-03-29 RX ADMIN — ONDANSETRON 4 MG: 2 INJECTION INTRAMUSCULAR; INTRAVENOUS at 08:56

## 2021-03-29 ASSESSMENT — LIFESTYLE VARIABLES
HAVE YOU EVER FELT YOU SHOULD CUT DOWN ON YOUR DRINKING: NO
HOW MANY TIMES IN THE PAST YEAR HAVE YOU HAD 5 OR MORE DRINKS IN A DAY: 0
CONSUMPTION TOTAL: NEGATIVE
EVER HAD A DRINK FIRST THING IN THE MORNING TO STEADY YOUR NERVES TO GET RID OF A HANGOVER: NO
EVER FELT BAD OR GUILTY ABOUT YOUR DRINKING: NO
SUBSTANCE_ABUSE: 0
HAVE PEOPLE ANNOYED YOU BY CRITICIZING YOUR DRINKING: NO
AVERAGE NUMBER OF DAYS PER WEEK YOU HAVE A DRINK CONTAINING ALCOHOL: 0
TOTAL SCORE: 0
TOTAL SCORE: 0
ALCOHOL_USE: NO
TOTAL SCORE: 0
ON A TYPICAL DAY WHEN YOU DRINK ALCOHOL HOW MANY DRINKS DO YOU HAVE: 0

## 2021-03-29 ASSESSMENT — ENCOUNTER SYMPTOMS
EYE DISCHARGE: 0
RESPIRATORY NEGATIVE: 1
BACK PAIN: 1
STRIDOR: 0
WEAKNESS: 1
COUGH: 0
LOSS OF CONSCIOUSNESS: 0
SEIZURES: 0
SHORTNESS OF BREATH: 0
NERVOUS/ANXIOUS: 0
BRUISES/BLEEDS EASILY: 1
POLYDIPSIA: 0
FEVER: 0
PSYCHIATRIC NEGATIVE: 1
EYES NEGATIVE: 1
HEMOPTYSIS: 0
EYE PAIN: 0
MYALGIAS: 1
EYE REDNESS: 0
CONSTITUTIONAL NEGATIVE: 1
NEUROLOGICAL NEGATIVE: 1
ABDOMINAL PAIN: 1
CARDIOVASCULAR NEGATIVE: 1
FALLS: 0
DIZZINESS: 0
FLANK PAIN: 0

## 2021-03-29 ASSESSMENT — PAIN DESCRIPTION - PAIN TYPE
TYPE: ACUTE PAIN
TYPE: ACUTE PAIN;CHRONIC PAIN

## 2021-03-29 ASSESSMENT — COGNITIVE AND FUNCTIONAL STATUS - GENERAL
DAILY ACTIVITIY SCORE: 24
SUGGESTED CMS G CODE MODIFIER MOBILITY: CH
MOBILITY SCORE: 24
SUGGESTED CMS G CODE MODIFIER DAILY ACTIVITY: CH

## 2021-03-29 NOTE — ASSESSMENT & PLAN NOTE
-- Secondary to SLE and bleed    -- Avoid transfusion due to high risk of TRALI vs TACO   -- Goal PLT > 20k unless there is evidence of active bleed

## 2021-03-29 NOTE — RESPIRATORY CARE
Respiratory Rapid Response Note    Symptoms > WOB, chest discomfort   Breath Sounds  RUL Breath Sounds: Diminished t/o posteriorly       ABG baseline upon arrival to ICU on NRB mask, 100%: transferred to ICU per Dr Verduzco (patient received lasix IV and solumedrol) Patient is initially anxious, sitting at bedside, tachypneic, chest xray, labs performed   O2 (LPM): 16 NRB mask, 100% FI02   Results for CASE REYNOSO (MRN [id number]) as of 3/28/2021 21:32   Ref. Range 3/28/2021 21:18   Action Range Triggered Unknown NO   Inst. Qualified Patient Unknown YES   O2 Therapy Latest Units: % 100   Specimen Unknown Arterial   Ph Latest Ref Range: 7.400 - 7.500  7.451   Pco2 Latest Ref Range: 26.0 - 37.0 mmHg 42.9 (H)   Po2 Latest Ref Range: 64 - 87 mmHg 66   Hco3 Latest Ref Range: 17.0 - 25.0 mmol/L 29.9 (H)   BE Latest Ref Range: -4 - 3 mmol/L 5 (H)   Tco2 Latest Ref Range: 20 - 33 mmol/L 31   S02 Latest Ref Range: 93 - 99 % 93   Ph Temp Linda Latest Ref Range: 7.400 - 7.500  7.444   Po2 Temp Cor Latest Ref Range: 64 - 87 mmHg 68     ABG drawn by Kellie PAK RRT after arrival to ICU, patient continues on NRB mask. Will continue to monitor patient closely

## 2021-03-29 NOTE — PROGRESS NOTES
[facility] Services Progress Note        HD today x 3 hours per Dr. Navin Metz. Initiated at 1440 and ended at 1740.   Patient alert and oriented x 3.    Patient tolertaed procedure well. High Blood pressure during treatment     UF Net: 2500 mL  Patient tolerated tx. Awake alert post treatment.        See paper flowsheet for details.     AVF Acecess  - bleeding, +Thrill and Bruit. Applied dressing to sights.     Report Given to Bedside RN Althea Barton RN.

## 2021-03-29 NOTE — PROGRESS NOTES
Pt arrived onto unit via hospital bed and was able to ambulate to ICU bed. On 16L O2 via NRB, O2 sat 93%, A/O x4 but lethargic. Tachypneic, hypertensive, RT bedside to do ABG and transfer patient onto BIPAP. Patient's mother bedside.

## 2021-03-29 NOTE — PROGRESS NOTES
Pt complaining of difficulty breathing and constant chest pressure. /129 no PRNs ordered, Spo2 74 on 4L via NC, titrated to 15L on oxymask with spo2 increased to 89%. Rapid response called and team arrived. M.D. ordered 100 mg IV Lasix which was administered at 2013.  Pt continues to report difficulty breathing. RR 50.    2040 Grace from lab called with a critical troponin of 842. Dr. Verduzco notified at 2041. Pt being transferred to ICU.

## 2021-03-29 NOTE — PROGRESS NOTES
Telemetry Strip     Strip printed: 1455  Measurements from am strip were as follows:  Rhythm: SR  HR: 81-94  Measurements: 0.18/ 0.08/ 0.36  Ectopy: r PVC             Normal Values  Rhythm SR  HR Range    Measurements 0.12-0.20 / 0.06-0.10  / 0.30-0.52

## 2021-03-29 NOTE — CARE PLAN
"  Problem: Communication  Goal: The ability to communicate needs accurately and effectively will improve  Outcome: PROGRESSING AS EXPECTED     Problem: Safety  Goal: Will remain free from injury  Outcome: PROGRESSING AS EXPECTED   Call bell in reach. All safety measures in place.   Problem: Bowel/Gastric:  Goal: Normal bowel function is maintained or improved  Outcome: PROGRESSING SLOWER THAN EXPECTED   Pt c/o pain in epigastric area with slight nausea.   Problem: Respiratory:  Goal: Respiratory status will improve  Outcome: PROGRESSING SLOWER THAN EXPECTED   Pt remains on 5L Oxymask. Pt states \"breathing feels better\"   "

## 2021-03-29 NOTE — PROGRESS NOTES
Gastroenterology ([facility]) Progress Note     Author: Ej Silva M.D.   Date & Time Created: 3/29/2021 12:44 PM    Chief Complaint:  hematemesis    Interval History:  Hematemesis resolved, denied melena.  Chronic abdominal pain unchanged, no worse.  Denied further modifying factors, associated symptoms or timing issues.  Of note, I spoke to patient's mother about patient to obtain further history.        Review of Systems:  Review of Systems   Constitutional: Negative.  Negative for fever.   HENT: Negative.  Negative for nosebleeds.    Eyes: Negative.  Negative for pain, discharge and redness.   Respiratory: Negative for cough, shortness of breath and stridor.    Cardiovascular: Negative.  Negative for chest pain and leg swelling.   Gastrointestinal: Positive for abdominal pain. Negative for melena.   Genitourinary: Negative.  Negative for flank pain and hematuria.   Musculoskeletal: Positive for back pain and myalgias. Negative for falls.   Skin: Positive for rash. Negative for itching.   Neurological: Negative.  Negative for dizziness, seizures and loss of consciousness.   Endo/Heme/Allergies: Negative for polydipsia. Bruises/bleeds easily.   Psychiatric/Behavioral: Negative.  Negative for substance abuse. The patient is not nervous/anxious.    All other systems reviewed and are negative.      Physical Exam:  Physical Exam  Vitals and nursing note reviewed. Exam conducted with a chaperone present.   Constitutional:       General: She is not in acute distress.     Appearance: Normal appearance. She is normal weight. She is ill-appearing. She is not toxic-appearing or diaphoretic.   HENT:      Head: Normocephalic and atraumatic.      Nose: Nose normal. No congestion or rhinorrhea.   Eyes:      General: No scleral icterus.        Right eye: No discharge.         Left eye: No discharge.      Extraocular Movements: Extraocular movements intact.      Conjunctiva/sclera: Conjunctivae normal.      Pupils: Pupils are  equal, round, and reactive to light.   Cardiovascular:      Rate and Rhythm: Normal rate and regular rhythm.      Pulses: Normal pulses.      Heart sounds: Normal heart sounds.   Pulmonary:      Effort: Pulmonary effort is normal. No respiratory distress.      Breath sounds: Normal breath sounds. No stridor.   Abdominal:      General: Abdomen is flat. Bowel sounds are normal. There is no distension.      Palpations: Abdomen is soft.      Tenderness: There is no abdominal tenderness.   Musculoskeletal:         General: Normal range of motion.      Cervical back: Normal range of motion and neck supple. No rigidity.      Right lower leg: No edema.      Left lower leg: No edema.   Skin:     General: Skin is warm and dry.   Neurological:      General: No focal deficit present.      Mental Status: She is alert and oriented to person, place, and time. Mental status is at baseline.      Cranial Nerves: No cranial nerve deficit.      Coordination: Coordination normal.   Psychiatric:         Mood and Affect: Mood normal.         Behavior: Behavior normal.         Thought Content: Thought content normal.         Judgment: Judgment normal.         Labs:  Recent Labs     03/28/21 2118   ISTATAPH 7.451   ISTATAPCO2 42.9*   ISTATAPO2 66   ISTATATCO2 31   RALAWQN5CBW 93   ISTATARTHCO3 29.9*   ISTATARTBE 5*   ISTATTEMP 99.4 F   ISTATFIO2 100   ISTATSPEC Arterial   ISTATAPHTC 7.444   JNTYSDVH4CL 68         Recent Labs     03/28/21 2010 03/29/21  0230 03/29/21  1110   SODIUM 128* 132* 133*   POTASSIUM 4.9 4.1 4.4   CHLORIDE 89* 93* 93*   CO2 27 28 30   BUN 51* 27* 34*   CREATININE 6.07* 3.05* 3.80*   MAGNESIUM  --  1.8  --    PHOSPHORUS  --  3.1  --    CALCIUM 8.6 9.2 8.9     Recent Labs     03/27/21  0155 03/28/21  0320 03/28/21 2010 03/29/21  0230 03/29/21  1110   ALTSGPT 7  --  6  --  6   ASTSGOT 18  --  15  --  14   ALKPHOSPHAT 67  --  62  --  54   TBILIRUBIN 0.6  --  0.5  --  0.6   LIPASE 33  --   --   --   --    GLUCOSE 93    < > 90 106* 127*    < > = values in this interval not displayed.     Recent Labs     21  0155 21  0346 21  0802 21  0230   RBC 3.48*   < > 3.09*  --  3.57* 3.45*   HEMOGLOBIN 9.1*   < > 8.1*  --  9.4* 9.1*   HEMATOCRIT 30.8*   < > 27.8*  --  31.9* 30.1*   PLATELETCT 49*   < > 31*  --  70* 43*   PROTHROMBTM 14.4  --   --   --   --  13.6   APTT 30.1  --   --  27.1  --   --    INR 1.15*  --   --   --   --  1.07    < > = values in this interval not displayed.     Recent Labs     21  0230 21  1110   WBC 6.7   < > 3.9* 10.4 10.9*  --    NEUTSPOLYS 84.60*  --   --   --  96.20*  --    LYMPHOCYTES 10.20*  --   --   --  2.50*  --    MONOCYTES 3.90  --   --   --  0.90  --    EOSINOPHILS 0.30  --   --   --  0.10  --    BASOPHILS 0.60  --   --   --  0.10  --    ASTSGOT 18  --   --  15  --  14   ALTSGPT 7  --   --  6  --  6   ALKPHOSPHAT 67  --   --  62  --  54   TBILIRUBIN 0.6  --   --  0.5  --  0.6    < > = values in this interval not displayed.     Hemodynamics:  Temp (24hrs), Av.8 °C (98.3 °F), Min:36 °C (96.8 °F), Max:37.7 °C (99.8 °F)  Temperature: 36.3 °C (97.4 °F)  Pulse  Av.2  Min: 57  Max: 132   Blood Pressure: (!) 190/124(RN notified)     Respiratory:    Respiration: 20, Pulse Oximetry: 97 %     Work Of Breathing / Effort: Mild;Tachypnea  RUL Breath Sounds: Clear, RML Breath Sounds: Clear, RLL Breath Sounds: Diminished;Clear, PALLAVI Breath Sounds: Clear, LLL Breath Sounds: Clear  Fluids:    Intake/Output Summary (Last 24 hours) at 3/29/2021 1244  Last data filed at 3/29/2021 0900  Gross per 24 hour   Intake 2195 ml   Output 3186 ml   Net -991 ml     Weight: 61.2 kg (134 lb 14.7 oz)  GI/Nutrition:  Orders Placed This Encounter   Procedures   • Diet Order Diet: Renal     Standing Status:   Standing     Number of Occurrences:   1     Order Specific Question:   Diet:     Answer:   Renal [8]      Medical Decision Making, by Problem:  Active Hospital Problems    Diagnosis    • *Upper GI bleed [K92.2]    • Anemia associated with acute blood loss [D62]    • Pulmonary edema [J81.1]    • ESRD (end stage renal disease) on dialysis (HCC) [N18.6, Z99.2]    • Elevated INR [R79.1]    • Thrombocytopenia (HCC) [D69.6]    • Elevated troponin [R77.8]    • Lupus (HCC) [M32.9]    • HTN (hypertension) [I10]    • Hemoptysis [R04.2]    • Immunosuppression (HCC) [D84.9]    • Chronic respiratory failure with hypoxia (HCC) [J96.11]    • Acute respiratory failure with hypoxemia (HCC) [J96.01]         Problems:  1. Hemoptysis: clarified by patient, no hematemesis  2. History of Hemorrhagic Gastritis and Karen-Keyes tear that was identified on 3/19/2021.  3. GERD  4. Chronic epigastric pain  5. Acute on chronic anemia with underlying ESRD.  Patient receives blood transfusions every other week.  Current Hgb; 8.4.  Has not needed blood transfusions during this admission.  6. ESRD-on hemodialysis (MWF)  7. Elevated troponin.  Appears to be chronic-3 months ago troponins 785.  1 month ago-troponin 931.  Current, 825.  8. Covid-negative  9. Acute on Chronic hypoxic respiratory failure  10. Increased complexity of medical decision making due to aforementioned.    Recommendations:  1. EGD deferred as patient has had multiple and recent, as well seemed to have stopped bleeding.  2. HD will help mitigate pulm edema  3. Protonix 40mg po BID x 8 weeks  4. Continue Sucralfate 1gm QiD before meals x 14 days.  5. [facility] signed off but please feel free to contact us with problems, questions and/or concerns.  6. Follow-up with established GI Dr. Kam as scheduled on 4/5/2021.    Quality-Core Measures   Reviewed items::  Labs reviewed and Medications reviewed

## 2021-03-29 NOTE — ASSESSMENT & PLAN NOTE
-- resolved with removal of fluid  -- stopped ABx as CXR markedly improved  -- monitor on telemetry

## 2021-03-29 NOTE — PROGRESS NOTES
"Pharmacy Kinetics 25 y.o. female on vancomycin day # 2 3/29/2021    Currently on Vancomycin 1500 mg  iv X 1  Provider specified end date: TBD    Indication for Treatment: PNA    Pertinent history per medical record: Admitted on 3/27/2021 for Upper GI Bleed.    Other antibiotics: None    Allergies: Cephalexin, Clindamycin, Methylprednisolone, and Metoprolol     List concerns for renal function : CrCl 27.7-Emergent HD 3/28    Pertinent cultures to date:   Pending    MRSA nares swab if pneumonia is a concern (ordered/positive/negative/n-a): Pending    Recent Labs     21  0155 21  0346 21  0320 21  0230   WBC 6.7 6.2 3.9* 10.4 10.9*   NEUTSPOLYS 84.60*  --   --   --  96.20*     Recent Labs     21  0155 21  0320 21  0230   BUN 19 38* 51* 27*   CREATININE 3.32* 5.04* 6.07* 3.05*   ALBUMIN 2.9*  --  2.7* 3.4     No results for input(s): VANCOTROUGH, VANCOPEAK, VANCORANDOM in the last 72 hours.    Intake/Output Summary (Last 24 hours) at 3/29/2021 0716  Last data filed at 3/29/2021 0600  Gross per 24 hour   Intake 2741 ml   Output 3186 ml   Net -445 ml      /84   Pulse 94   Temp 36 °C (96.8 °F) (Temporal)   Resp (!) 29   Ht 1.702 m (5' 7\")   Wt 61.2 kg (134 lb 14.7 oz)   SpO2 95%  Temp (24hrs), Av.1 °C (98.7 °F), Min:36 °C (96.8 °F), Max:37.7 °C (99.8 °F)      A/P   1. Vancomycin dose change: No dose ordered until random level results  2. Next vancomycin level: 3/29 17:30  3. Goal trough: 15-20 mcg/ml  4. Comments: Will continue to monitor and adjust dose as needed per protocol.    Amanda FLETCHER Ph.    "

## 2021-03-29 NOTE — PROGRESS NOTES
Halima from Lab called with critical result of platelets at 43,000 and troponin at 739 at 0406. Critical lab result read back to Halima.   Dr. Madison notified of critical lab result at 0415.  Critical lab result read back by Dr. Madison.

## 2021-03-29 NOTE — CONSULTS
Pulmonary Consultation    Date of consult: 3/28/2021    Referring Physician  Juanjo Wilson D.O.    Reason for Consultation  Acute hypoxemia respiratory failure    History of Presenting Illness  25 y.o. female who presented 3/27/2021 with acute hypoxemia respiratory failure. Patient with significant history of lupus nephritis requiring HD every M/W/F, SLE follow by Dr. Wong, HTN, seizure, recurrent nida thao tear, thrombocytopenia, and chronic anemia presents with hematemesis. On presentation, her Hb 9.1 and PLT 49. CXR showed diffuse bilateral airspace disease with basilar predominant. GI consulted and EGD was deferred due to worsening hypoxemia. Her PLT downtrended to 31 and received one unit of PLT at 4 pm. Post transfusion, she developed worsening shortness of breath and hypoxemia. Transferred to ICU requiring HFNC 60/100% with SpO2 ~84%. CXR showed worsening airspace disease with fluids present at the minor fissure. Given hx of lupus nephritis and concern for DAH, she was given solumedrol 1 gram IV once. Intensivist consulted for possible needing endotracheal intubation.    On my assessment at 1030, patient was transitioned to BiPAP 12/5 FiO2 40% with SpO2 93%. She is awake and following commands. Upon further questioning, she reports coughing up blood three days ago prior to admission. Associated with dyspnea on exertion and hematemesis. Denies chest pain, fever/chills, or recent sick contact. No recent exposure to anyone with COVID-19.      Case d/w Dr. Trent requesting for emergent HD with volume removal.         Code Status  Full Code    Review of Systems  Review of Systems   Constitutional: Negative for chills, fever, malaise/fatigue and weight loss.   Respiratory: Positive for cough, hemoptysis, sputum production and shortness of breath.    Cardiovascular: Positive for orthopnea. Negative for chest pain, palpitations, claudication and leg swelling.   Gastrointestinal: Positive for abdominal pain,  nausea and vomiting.   Musculoskeletal: Negative for back pain, myalgias and neck pain.       Past Medical History   has a past medical history of Anemia (01/17/2018), AVF (arteriovenous fistula) ([facility]), Dialysis patient ([facility]), ESRD (end stage renal disease) on dialysis ([facility]) (01/17/2018), Heart burn, Hypertension (01/17/2018), Indigestion, Lupus ([facility]), Migraines (01/17/2018), and Seizure ([facility]) (2013).    Surgical History   has a past surgical history that includes ronak by laparoscopy (4/5/2010); av fistula creation (Right); angioplasty (01/17/2018); other; other; gastroscopy-endo (12/9/2019); gastroscopy (N/A, 5/30/2020); gastroscopy-endo (9/18/2020); pr upper gi endoscopy,ctrl bleed (11/12/2020); pr upper gi endoscopy,biopsy (11/12/2020); gastroscopy-endo (11/12/2020); pr colonoscopy,diagnostic (1/8/2021); pr upper gi endoscopy,diagnosis (1/8/2021); pr upper gi endoscopy,ctrl bleed (1/8/2021); pr upper gi endoscopy,diagnosis (3/5/2021); pr upper gi endoscopy,diagnosis (N/A, 3/19/2021); and pr upper gi endoscopy,ctrl bleed (3/19/2021).    Family History  family history includes Diabetes in her paternal grandmother.    Social History   reports that she has never smoked. She has never used smokeless tobacco. She reports that she does not drink alcohol and does not use drugs.    Medications  Home Medications    **Home medications have not yet been reviewed for this encounter**       Current Facility-Administered Medications   Medication Dose Route Frequency Provider Last Rate Last Admin   • NS infusion   Intravenous Continuous Juanjo Wilson D.O. 30 mL/hr at 03/28/21 1227 New Bag at 03/28/21 1227   • FUROSEMIDE 10 MG/ML INJ SOLN            • methylPREDNISolone sod succ (SOLU-MEDROL) 1,000 mg in NS 50 mL ivpb  1,000 mg Intravenous QDAY Karissa Verduzco M.D. 100 mL/hr at 03/28/21 2133 1,000 mg at 03/28/21 2133   • LORazepam (ATIVAN) injection 1 mg  1 mg Intravenous Q6HRS PRN Karissa Verduzco M.D.   1 mg at 03/28/21 2151    • cloNIDine (CATAPRES) tablet 0.1 mg  0.1 mg Oral 4X/DAY PRN Karissa Verduzco M.D.   0.1 mg at 03/28/21 2131   • MD Alert...Vancomycin per Pharmacy   Other PRN Karissa Verduzco M.D.       • [START ON 3/29/2021] levoFLOXacin in D5W (LEVAQUIN) IVPB 500 mg  500 mg Intravenous Q24HRS Karissa Verduzco M.D.       • vancomycin (VANCOCIN) 1,500 mg in  mL IVPB  25 mg/kg Intravenous Once Karissa Verduzco M.D.       • VANCOMYCIN 1500 MG/250ML NS IVPB (PREMIX)            • amLODIPine (NORVASC) tablet 10 mg  10 mg Oral Q EVENING Argentina Chase M.D.   10 mg at 03/28/21 1831   • carvedilol (COREG) tablet 25 mg  25 mg Oral BID WITH MEALS Argentina Chase M.D.   25 mg at 03/28/21 1832   • furosemide (LASIX) tablet 40 mg  40 mg Oral BID DIURETIC Argentina Chase M.D.   40 mg at 03/28/21 1831   • hydroxychloroquine (Plaquenil) tablet 200 mg  200 mg Oral Q EVENING Argentina Chase M.D.   200 mg at 03/28/21 1831   • predniSONE (DELTASONE) tablet 5 mg  5 mg Oral Q EVENING CHAUNCEY RustDJg   5 mg at 03/28/21 1832   • sucralfate (CARAFATE) 1 GM/10ML suspension 1 g  1 g Oral 4X/DAY ACHS Argentina Chase M.D.   1 g at 03/28/21 1831   • acetaminophen (Tylenol) tablet 650 mg  650 mg Oral Q6HRS PRN Argentina Chase M.D.       • ondansetron (ZOFRAN) syringe/vial injection 4 mg  4 mg Intravenous Q4HRS PRN Argentina Chase M.D.   4 mg at 03/28/21 2201   • ondansetron (ZOFRAN ODT) dispertab 4 mg  4 mg Oral Q4HRS PRN Argentina Eulogio-Lista, M.D.   Stopped at 03/27/21 0837   • promethazine (PHENERGAN) tablet 12.5-25 mg  12.5-25 mg Oral Q4HRS PRRIP Chase M.D.       • promethazine (PHENERGAN) suppository 12.5-25 mg  12.5-25 mg Rectal Q4HRS PRRIP Chase M.D.       • prochlorperazine (COMPAZINE) injection 5-10 mg  5-10 mg Intravenous Q4HRS PRRIP Chase M.D.   10 mg at 03/28/21 0135   • senna-docusate (PERICOLACE or SENOKOT S) 8.6-50 MG per tablet  2 tablet  2 tablet Oral BID Argentina Chase M.D.   Stopped at 03/27/21 0600    And   • polyethylene glycol/lytes (MIRALAX) PACKET 1 Packet  1 Packet Oral QDAY PRN Argentina Chase M.D.        And   • magnesium hydroxide (MILK OF MAGNESIA) suspension 30 mL  30 mL Oral QDAY PRN Argentina Chase M.D.        And   • bisacodyl (DULCOLAX) suppository 10 mg  10 mg Rectal QDAY PRN Argentina Chase M.D.       • HYDROmorphone (Dilaudid) injection 0.5 mg  0.5 mg Intravenous Q3HRS PRN Argentina Chase M.D.   0.5 mg at 03/28/21 2201   • omeprazole (PRILOSEC) capsule 40 mg  40 mg Oral BID Kiersten Louis, A.P.R.NJg   40 mg at 03/28/21 1832   • mycophenolate (MYFORTIC) TBEC 180 mg  180 mg Oral Q EVENING Juanjo Wilson D.OJg   180 mg at 03/28/21 1800       Allergies  Allergies   Allergen Reactions   • Cephalexin Rash     .   • Clindamycin Rash     .   • Methylprednisolone Unspecified     Anxious   • Metoprolol Rash     .       Vital Signs last 24 hours  Temp:  [36.7 °C (98.1 °F)-37.7 °C (99.8 °F)] 37.4 °C (99.4 °F)  Pulse:  [] 117  Resp:  [16-50] 41  BP: (148-212)/() 182/126  SpO2:  [92 %-97 %] 97 %    Physical Exam  Physical Exam  Constitutional:       Appearance: She is ill-appearing.      Comments: On BiPAP    HENT:      Head: Normocephalic.      Nose: Nose normal.   Eyes:      Extraocular Movements: Extraocular movements intact.      Pupils: Pupils are equal, round, and reactive to light.   Cardiovascular:      Rate and Rhythm: Regular rhythm. Tachycardia present.      Heart sounds: Murmur present.   Pulmonary:      Comments: Coarse BS bilaterally and tachypnea     Abdominal:      General: Abdomen is flat. Bowel sounds are normal. There is no distension.      Palpations: Abdomen is soft.   Musculoskeletal:         General: Normal range of motion.      Cervical back: Normal range of motion.      Right lower leg: No edema.      Left lower leg: No edema.   Skin:     General: Skin  is warm.      Capillary Refill: Capillary refill takes more than 3 seconds.   Neurological:      Mental Status: She is alert and oriented to person, place, and time.      Cranial Nerves: No cranial nerve deficit.   Psychiatric:         Mood and Affect: Mood normal.         Fluids    Intake/Output Summary (Last 24 hours) at 3/28/2021 2218  Last data filed at 3/28/2021 1500  Gross per 24 hour   Intake 1596 ml   Output 50 ml   Net 1546 ml       Laboratory  Recent Results (from the past 48 hour(s))   CBC WITH DIFFERENTIAL    Collection Time: 03/27/21  1:55 AM   Result Value Ref Range    WBC 6.7 4.8 - 10.8 K/uL    RBC 3.48 (L) 4.20 - 5.40 M/uL    Hemoglobin 9.1 (L) 12.0 - 16.0 g/dL    Hematocrit 30.8 (L) 37.0 - 47.0 %    MCV 88.5 81.4 - 97.8 fL    MCH 26.1 (L) 27.0 - 33.0 pg    MCHC 29.5 (L) 33.6 - 35.0 g/dL    RDW 61.9 (H) 35.9 - 50.0 fL    Platelet Count 49 (LL) 164 - 446 K/uL    Neutrophils-Polys 84.60 (H) 44.00 - 72.00 %    Lymphocytes 10.20 (L) 22.00 - 41.00 %    Monocytes 3.90 0.00 - 13.40 %    Eosinophils 0.30 0.00 - 6.90 %    Basophils 0.60 0.00 - 1.80 %    Immature Granulocytes 0.40 0.00 - 0.90 %    Nucleated RBC 0.00 /100 WBC    Neutrophils (Absolute) 5.66 2.00 - 7.15 K/uL    Lymphs (Absolute) 0.68 (L) 1.00 - 4.80 K/uL    Monos (Absolute) 0.26 0.00 - 0.85 K/uL    Eos (Absolute) 0.02 0.00 - 0.51 K/uL    Baso (Absolute) 0.04 0.00 - 0.12 K/uL    Immature Granulocytes (abs) 0.03 0.00 - 0.11 K/uL    NRBC (Absolute) 0.00 K/uL    Hypochromia 1+     Anisocytosis 1+     Macrocytosis 1+     Microcytosis 1+    COMP METABOLIC PANEL    Collection Time: 03/27/21  1:55 AM   Result Value Ref Range    Sodium 129 (L) 135 - 145 mmol/L    Potassium 4.0 3.6 - 5.5 mmol/L    Chloride 90 (L) 96 - 112 mmol/L    Co2 34 (H) 20 - 33 mmol/L    Anion Gap 5.0 (L) 7.0 - 16.0    Glucose 93 65 - 99 mg/dL    Bun 19 8 - 22 mg/dL    Creatinine 3.32 (H) 0.50 - 1.40 mg/dL    Calcium 9.1 8.4 - 10.2 mg/dL    AST(SGOT) 18 12 - 45 U/L    ALT(SGPT) 7 2 -  50 U/L    Alkaline Phosphatase 67 30 - 99 U/L    Total Bilirubin 0.6 0.1 - 1.5 mg/dL    Albumin 2.9 (L) 3.2 - 4.9 g/dL    Total Protein 8.2 6.0 - 8.2 g/dL    Globulin 5.3 (H) 1.9 - 3.5 g/dL    A-G Ratio 0.5 g/dL   LIPASE    Collection Time: 03/27/21  1:55 AM   Result Value Ref Range    Lipase 33 7 - 58 U/L   TROPONIN    Collection Time: 03/27/21  1:55 AM   Result Value Ref Range    Troponin T 825 (H) 6 - 19 ng/L   Prothrombin Time    Collection Time: 03/27/21  1:55 AM   Result Value Ref Range    PT 14.4 12.0 - 14.6 sec    INR 1.15 (H) 0.87 - 1.13   APTT    Collection Time: 03/27/21  1:55 AM   Result Value Ref Range    APTT 30.1 24.7 - 36.0 sec   ESTIMATED GFR    Collection Time: 03/27/21  1:55 AM   Result Value Ref Range    GFR If  21 (A) >60 mL/min/1.73 m 2    GFR If Non African American 17 (A) >60 mL/min/1.73 m 2   PLATELET ESTIMATE    Collection Time: 03/27/21  1:55 AM   Result Value Ref Range    Plt Estimation Marked Decrease    MORPHOLOGY    Collection Time: 03/27/21  1:55 AM   Result Value Ref Range    RBC Morphology Present     Polychromia 1+     Poikilocytosis 1+     Ovalocytes 1+    IMMATURE PLT FRACTION    Collection Time: 03/27/21  1:55 AM   Result Value Ref Range    Imm. Plt Fraction 10.8 0.6 - 13.1 K/uL   DIFFERENTIAL COMMENT    Collection Time: 03/27/21  1:55 AM   Result Value Ref Range    Comments-Diff see below    CBC WITHOUT DIFFERENTIAL    Collection Time: 03/27/21  3:46 AM   Result Value Ref Range    WBC 6.2 4.8 - 10.8 K/uL    RBC 3.14 (L) 4.20 - 5.40 M/uL    Hemoglobin 8.3 (L) 12.0 - 16.0 g/dL    Hematocrit 28.0 (L) 37.0 - 47.0 %    MCV 89.2 81.4 - 97.8 fL    MCH 26.4 (L) 27.0 - 33.0 pg    MCHC 29.6 (L) 33.6 - 35.0 g/dL    RDW 62.8 (H) 35.9 - 50.0 fL    Platelet Count 36 (LL) 164 - 446 K/uL   COV-2, FLU A/B, AND RSV BY PCR (2-4 HOURS CEPHEID): Collect NP swab in [facility]    Collection Time: 03/27/21  6:23 AM    Specimen: Respirate   Result Value Ref Range    Influenza virus A RNA  Negative Negative    Influenza virus B, PCR Negative Negative    RSV, PCR Negative Negative    SARS-CoV-2 by PCR NotDetected     SARS-CoV-2 Source NP Swab    HEMOGLOBIN AND HEMATOCRIT    Collection Time: 03/27/21 10:15 AM   Result Value Ref Range    Hemoglobin 8.4 (L) 12.0 - 16.0 g/dL    Hematocrit 28.7 (L) 37.0 - 47.0 %   TROPONIN    Collection Time: 03/27/21 10:15 AM   Result Value Ref Range    Troponin T 788 (H) 6 - 19 ng/L   HEMOGLOBIN AND HEMATOCRIT    Collection Time: 03/27/21  3:13 PM   Result Value Ref Range    Hemoglobin 8.6 (L) 12.0 - 16.0 g/dL    Hematocrit 29.9 (L) 37.0 - 47.0 %   CBC without Differential    Collection Time: 03/28/21  3:20 AM   Result Value Ref Range    WBC 3.9 (L) 4.8 - 10.8 K/uL    RBC 3.09 (L) 4.20 - 5.40 M/uL    Hemoglobin 8.1 (L) 12.0 - 16.0 g/dL    Hematocrit 27.8 (L) 37.0 - 47.0 %    MCV 90.0 81.4 - 97.8 fL    MCH 26.2 (L) 27.0 - 33.0 pg    MCHC 29.1 (L) 33.6 - 35.0 g/dL    RDW 62.0 (H) 35.9 - 50.0 fL    Platelet Count 31 (LL) 164 - 446 K/uL   Basic Metabolic Panel (BMP)    Collection Time: 03/28/21  3:20 AM   Result Value Ref Range    Sodium 129 (L) 135 - 145 mmol/L    Potassium 4.4 3.6 - 5.5 mmol/L    Chloride 91 (L) 96 - 112 mmol/L    Co2 30 20 - 33 mmol/L    Glucose 83 65 - 99 mg/dL    Bun 38 (H) 8 - 22 mg/dL    Creatinine 5.04 (H) 0.50 - 1.40 mg/dL    Calcium 8.7 8.4 - 10.2 mg/dL    Anion Gap 8.0 7.0 - 16.0   ESTIMATED GFR    Collection Time: 03/28/21  3:20 AM   Result Value Ref Range    GFR If  13 (A) >60 mL/min/1.73 m 2    GFR If Non African American 11 (A) >60 mL/min/1.73 m 2   IMMATURE PLT FRACTION    Collection Time: 03/28/21  3:20 AM   Result Value Ref Range    Imm. Plt Fraction 15.5 (H) 0.6 - 13.1 K/uL   LDH    Collection Time: 03/28/21  8:02 AM   Result Value Ref Range    LDH Total 308 (H) 107 - 266 U/L   APTT    Collection Time: 03/28/21  8:02 AM   Result Value Ref Range    APTT 27.1 24.7 - 36.0 sec   HEPATITIS PANEL ACUTE(4 COMPONENTS)    Collection  Time: 03/28/21  8:02 AM   Result Value Ref Range    Hepatitis B Surface Antigen Non-Reactive Non-Reactive    Hepatitis B Cors Ab,IgM Non-Reactive Non-Reactive    Hepatitis A Virus Ab, IgM Non-Reactive Non-Reactive    Hepatitis C Antibody Non-Reactive Non-Reactive   PLATELETS REQUEST    Collection Time: 03/28/21 12:01 PM   Result Value Ref Range    Component P       P0I                 Plts,PheresisIRR    [id number]   issued       03/28/21   11:01      Product Type Platelets Pheresis IRR LR     Dispense Status issued     Unit Number (Barcoded) [id number]     Product Code (Barcoded) S5806B20     Blood Type (Barcoded) 5100    CBC WITHOUT DIFFERENTIAL    Collection Time: 03/28/21  8:10 PM   Result Value Ref Range    WBC 10.4 4.8 - 10.8 K/uL    RBC 3.57 (L) 4.20 - 5.40 M/uL    Hemoglobin 9.4 (L) 12.0 - 16.0 g/dL    Hematocrit 31.9 (L) 37.0 - 47.0 %    MCV 89.4 81.4 - 97.8 fL    MCH 26.3 (L) 27.0 - 33.0 pg    MCHC 29.5 (L) 33.6 - 35.0 g/dL    RDW 62.1 (H) 35.9 - 50.0 fL    Platelet Count 70 (L) 164 - 446 K/uL   Comp Metabolic Panel    Collection Time: 03/28/21  8:10 PM   Result Value Ref Range    Sodium 128 (L) 135 - 145 mmol/L    Potassium 4.9 3.6 - 5.5 mmol/L    Chloride 89 (L) 96 - 112 mmol/L    Co2 27 20 - 33 mmol/L    Anion Gap 12.0 7.0 - 16.0    Glucose 90 65 - 99 mg/dL    Bun 51 (H) 8 - 22 mg/dL    Creatinine 6.07 (HH) 0.50 - 1.40 mg/dL    Calcium 8.6 8.4 - 10.2 mg/dL    AST(SGOT) 15 12 - 45 U/L    ALT(SGPT) 6 2 - 50 U/L    Alkaline Phosphatase 62 30 - 99 U/L    Total Bilirubin 0.5 0.1 - 1.5 mg/dL    Albumin 2.7 (L) 3.2 - 4.9 g/dL    Total Protein 7.9 6.0 - 8.2 g/dL    Globulin 5.2 (H) 1.9 - 3.5 g/dL    A-G Ratio 0.5 g/dL   TROPONIN    Collection Time: 03/28/21  8:10 PM   Result Value Ref Range    Troponin T 842 (H) 6 - 19 ng/L   ESTIMATED GFR    Collection Time: 03/28/21  8:10 PM   Result Value Ref Range    GFR If African American 10 (A) >60 mL/min/1.73 m 2    GFR If Non  9 (A) >60  mL/min/1.73 m 2   ISTAT ARTERIAL BLOOD GAS    Collection Time: 03/28/21  9:18 PM   Result Value Ref Range    Ph 7.451 7.400 - 7.500    Pco2 42.9 (H) 26.0 - 37.0 mmHg    Po2 66 64 - 87 mmHg    Tco2 31 20 - 33 mmol/L    S02 93 93 - 99 %    Hco3 29.9 (H) 17.0 - 25.0 mmol/L    BE 5 (H) -4 - 3 mmol/L    Body Temp 99.4 F degrees    O2 Therapy 100 %    iPF Ratio 66     Ph Temp Linda 7.444 7.400 - 7.500    Po2 Temp Cor 68 64 - 87 mmHg    Specimen Arterial     Action Range Triggered NO     Inst. Qualified Patient YES        Imaging  DX-CHEST-PORTABLE (1 VIEW)   Final Result         1.  Extensive bilateral pulmonary parenchymal opacities are compatible with ARDS, severe pulmonary edema, and/or superimposed pulmonary infiltrates; findings increased since prior exam.      CT-RENAL COLIC EVALUATION(A/P W/O)   Final Result      1.  Lung bases show interstitial and extensive alveolar opacities as well as small effusions, likely pulmonary edema.  Superimposed pneumonia is not excluded.   2.  Atrophic kidneys.   3.  No CT evidence for appendicitis.   4.  Focally dilated small bowel loop in LEFT lower quadrant of uncertain significance.  No definite bowel obstruction.   5.  Periumbilical subcutaneous edema of uncertain significance.      DX-CHEST-PORTABLE (1 VIEW)   Final Result      1.  Worsening bilateral interstitial and alveolar opacities, edema versus pneumonia.   2.  Minimal bilateral pleural effusions, increased from prior.   3.  No pneumoperitoneum demonstrated.      EC-ECHOCARDIOGRAM COMPLETE W/ CONT    (Results Pending)       Assessment/Plan  * Upper GI bleed- (present on admission)  Assessment & Plan  -- Recurrent GI bleed related nida thao tear. Other ddx include AV malformation vs dieulafoy lesion vs gastritis   -- On PPI BID    -- Trend H/H q6hr   -- GI following and deferred EGD due to worsening hypoxemia   -- Orderd DDAVP 0.1 mcg/kg IV once       Anemia associated with acute blood loss  Assessment & Plan  --  Multifactorial related to ESRD, GI bleed, possible DAH   -- Trend H/H q6hr   -- Goal Hb > 7      ESRD (end stage renal disease) on dialysis (HCC)- (present on admission)  Assessment & Plan  -- Needs emergent HD due to concern for pulmonary edema related to TACO    -- Case d/w Dr. Trent who will arrange HD with volume removal tonight   -- Daily BMP       Hemoptysis  Assessment & Plan  -- Considered multifactorial related to pulmonary edema vs DAH vs PNA    -- Will check C3, C4, anti-dsDNA, CTD panel, ESR, and CRP   -- Start vanc/cefepime   -- Cont HD with volume removal    -- Cont solumedrol 1 gram IV daily X 3 days follow by 60 mg IV q6hr    -- If hemoptysis does not improve with HD then will need plasmapharesis and consultation to [place] for transfer       Acute respiratory failure with hypoxemia (HCC)  Assessment & Plan  -- Review of CXR showed worsening airspace disease with fluids in minor fissure and mildly splayed left rich   -- Ddx include pulmonary edema vs DAH vs infectious etiology vs ARDS related TRALI  -- Given hx of multiple blood transfusion with increased weight gain ~5 kg, would favor pulmonary edema as the primary driving factors but cannot rule out DAH or PNA at this time.   -- Will start vancomycin/cefepime   -- Obtain respiratory culture   -- Check procalcitonin   -- Needs emergent HD with volume removal    -- Will hold off on intubation given patient is tolerating BiPAP 12/5 FiO2 40%  -- Discussed the utility of bronchoscopy with serial BAL aliquots to rule out DAH and PCP pneumonia.  At this time, patient is hesitant about undergoing bronchoscopy as she she will require endotracheal intubation. Reasonable to treat empirically with steroids, abx, and volume removal via HD and if no improvement requiring endotracheal intubation then will offer bronchoscopy with BAL.    -- Cont NIVVP and titrate for goal SpO2 > 90%       Thrombocytopenia (HCC)- (present on admission)  Assessment & Plan  --  Secondary to SLE and bleed    -- Avoid transfusion due to high risk of TRALI vs TACO   -- Goal PLT > 20k unless there is evidence of active bleed       HTN (hypertension)  Assessment & Plan  -- Current SBP ~160s    -- Needs volume removal via HD   -- Will avoid IV push meds and offer nicardipine infusion to seek goal SBP < 140             Discussed patient condition and risk of morbidity and/or mortality with Hospitalist, Family, RN, RT and Patient.    The patient remains critically ill.  Critical care time = 49 minutes in directly providing and coordinating critical care and extensive data review.  No time overlap and excludes procedures.

## 2021-03-29 NOTE — ASSESSMENT & PLAN NOTE
-- Multifactorial related to ESRD,recent GI bleed from Hemorrhagic Gastritis and Karen-Keyes tear that was identified on 3/19/2021  -- Trend H/H q6hr   -- Goal Hb > 7

## 2021-03-29 NOTE — CONSULTS
DATE OF SERVICE:  03/28/2021     NEPHROLOGY CONSULTATION     REQUESTING PHYSICIAN:  uJanjo Wilson DO.     REASON FOR CONSULTATION:  To evaluate and provide dialysis for patient with   end-stage renal disease.     HISTORY OF PRESENT ILLNESS:  The patient is a 25-year-old female with   end-stage renal disease secondary to lupus nephritis, diagnosed 10 years ago,   on hemodialysis, who presented to the emergency room on 03/27/2021 with   complaints of nausea, vomiting, epigastric abdominal pain.  She is   dialysis dependent, receiving her treatments on Monday, Wednesday, and Friday   in [facility]. Since December, patient has not been feeling   good.  She is in the lupus relapse, started recently on Benlysta by her   rheumatologist, Dr. Wong.  Currently, patient is complaining of fatigue,   weakness, weight loss, poor appetite, nausea.  She had vomited this morning.    Dialysis scheduled for tomorrow.     REVIEW OF SYSTEMS:  GENERAL:  Positive for fatigue, weight loss.  No fever or chills.  HEENT:  No nosebleeds, no sinus pain, no sore throat.  EYES:  No double or blurry vision.  NECK:  No pain, no stiffness.  RESPIRATORY:  Mild shortness of breath.  No wheezes, no cough.  CARDIOVASCULAR:  No chest pain, no edema, no palpitation.  GASTROINTESTINAL:  Nausea, vomiting.  No diarrhea.  All other systems reviewed, all negative.     PAST MEDICAL HISTORY:  Systemic lupus erythematosus, lupus nephritis,   end-stage renal disease on hemodialysis, anemia, gastrointestinal bleeding and   gastroenteritis.     PAST SURGICAL HISTORY:  AV fistula creation and endoscopies.     SOCIAL HISTORY:  No tobacco, alcohol or drug use.  Lives with family.     FAMILY HISTORY:  Diabetes mellitus type 2.  No history of lupus.     ALLERGIES:  ALLERGIC TO KEFLEX, CLINDAMYCIN, METHYLPREDNISOLONE AND   METOPROLOL.     PHYSICAL EXAMINATION:  VITAL SIGNS:  Blood pressure 148/97, heart rate 109, temperature 37.6 Celsius.  GENERAL  APPEARANCE:  Well-developed, well-nourished female in no acute   distress.  HEENT:  Normocephalic, atraumatic.  Pupils equal, round, reactive to light.    Extraocular movements intact.  Nares patent.  Oropharynx clear, moist mucosa,   no erythema or exudate.  NECK:  Supple.  No lymphadenopathy, no thyromegaly appreciated.  LUNGS:  Clear to auscultation bilaterally.  No rales, no wheezes, no rhonchi.  HEART:  Regular rhythm.  No rub or gallop.  ABDOMEN:  Soft, nontender, nondistended.  Bowel sounds present.  EXTREMITIES:  No cyanosis, no clubbing, no edema.  SKIN:  Dry. No erythema or rash.  NEUROLOGIC:  Alert, oriented x3, no focal deficit.     LABORATORY DATA:  Reviewed, revealed hemoglobin 8.1, platelets 31,000, white   blood count 3.1.  Sodium 129, potassium 4.4, BUN 38 and creatinine 5.04.     ASSESSMENT AND PLAN:  The patient is a 25-year-old female with long-term   history of lupus nephritis as a cause of kidney failure, on hemodialysis, now   with systemic lupus erythematosus flare up, coming with GI bleeding.  1.  End-stage renal disease.  We will continue dialysis per patient's schedule   Monday, Wednesday, Friday.  We will dialyze tomorrow.  2.  Electrolytes, noticed mild hyponatremia. Avoid hypotonic solutions, to   monitor.  3.  Anemia.  Continue Epogen with hemodialysis.  To monitor hemoglobin level   closely.  4.  Gastrointestinal bleeding.  Gastroenterology team is following.  5.  Hypertension.  Blood pressure, elevated BP.  Continue current treatment.    To monitor blood pressure closely, up losartan 100 mg once a day.     RECOMMENDATIONS:  Hemodialysis Monday, Wednesday and Friday.  Daily monitoring   of basic metabolic panel, hemoglobin level.  Epogen with dialysis, renal   diet.  All medications adjust to renal doses.  We will follow the patient   closely.     Thank you for the consult.        ______________________________  MD ROSANNA MOREIRA/MITCHELL/DOMINIQUE    DD:  03/28/2021 17:27  DT:  03/28/2021  19:35    Job#:  [id number]

## 2021-03-29 NOTE — ASSESSMENT & PLAN NOTE
-- markedly improved with dialysis and removal of 2.5 l  -- suggests TRALI vs TACO   -- no further hemoptysis  -- seems unlikely DAH so stopped gram of solumedrol and changed to stress dose solumedrol and can revert to home dose in am   -- HD to help with volume  -- EF 45% but will need to be reassessed in 4-6 weeks

## 2021-03-29 NOTE — PROGRESS NOTES
"Assumed pt care. AOx4.Denies any other distress.  Discussed POC.  Pt verbalized understanding.  Hourly rounding in place. Fall precautions in place and call lights w/in reach.  Pt appears to be resting comfortably. Remains on 5L Oxymask. Denies any SOB at this time. Pt states that her pain \"has improved\" Heating pad remains in place.     "

## 2021-03-29 NOTE — PROGRESS NOTES
[facility] Progress Note     Per Dr Trent ordered to dialyze the pt now.

## 2021-03-29 NOTE — PROGRESS NOTES
"Pulmonary Progress Note    Date of admission  3/27/2021    Chief Complaint  25 y.o. female admitted 3/27/2021 with hemoptysis    Hospital Course  \"25 y.o. female who presented 3/27/2021 with acute hypoxemia respiratory failure. Patient with significant history of lupus nephritis requiring HD every M/W/F, SLE follow by Dr. Wong, HTN, seizure, recurrent nida keyes tear, thrombocytopenia, and chronic anemia presents with hematemesis. On presentation, her Hb 9.1 and PLT 49. CXR showed diffuse bilateral airspace disease with basilar predominant. GI consulted and EGD was deferred due to worsening hypoxemia. Her PLT downtrended to 31 and received one unit of PLT at 4 pm. Post transfusion, she developed worsening shortness of breath and hypoxemia. Transferred to ICU 3/28requiring HFNC 60/100% with SpO2 ~84%. CXR showed worsening airspace disease with fluids present at the minor fissure. Given hx of lupus nephritis and concern for DAH, she was given solumedrol 1 gram IV once\" From Dr. Piña's consult note    On assessment pt found to be in pulm edema thought to be TRALI vs TACO as [y jose teveive ed platelets   Pt responded very well to dialysis and removal of 2.5 liters with marked improvement of CXR  ECHO showed EF 45%    She has known hx of Hemorrhagic Gastritis and Nida-Keyes tear that was identified on 3/19/2021    Interval Problem Update  Reviewed last 24 hour events:   Urgent dialysis overnight and done markedly well     Review of Systems  Review of Systems   Constitutional: Positive for malaise/fatigue.   HENT: Negative.    Eyes: Negative.    Respiratory: Negative.  Negative for hemoptysis and shortness of breath.    Cardiovascular: Negative.    Gastrointestinal: Positive for abdominal pain.        Abdominal pain is chronic   Genitourinary: Negative.    Musculoskeletal: Positive for back pain and myalgias.   Skin: Negative.    Neurological: Positive for weakness.   Endo/Heme/Allergies: Negative.  "   Psychiatric/Behavioral: Negative.         Vital Signs for last 24 hours   Temp:  [36 °C (96.8 °F)-37.7 °C (99.8 °F)] 36.3 °C (97.4 °F)  Pulse:  [] 80  Resp:  [18-45] 20  BP: (101-212)/() 150/91  SpO2:  [92 %-100 %] 97 %          Physical Exam   Physical Exam  Constitutional:       Appearance: She is ill-appearing.   HENT:      Head: Normocephalic and atraumatic.      Mouth/Throat:      Mouth: Mucous membranes are dry.   Eyes:      Extraocular Movements: Extraocular movements intact.      Pupils: Pupils are equal, round, and reactive to light.   Cardiovascular:      Rate and Rhythm: Normal rate.   Pulmonary:      Effort: Pulmonary effort is normal.      Breath sounds: Normal breath sounds.   Abdominal:      General: Abdomen is flat. Bowel sounds are normal.      Palpations: Abdomen is soft.   Musculoskeletal:      Cervical back: Normal range of motion.      Right lower leg: Edema present.      Left lower leg: Edema present.   Skin:     Comments: Discoloration of her neck   Neurological:      General: No focal deficit present.      Mental Status: She is oriented to person, place, and time.   Psychiatric:         Mood and Affect: Mood normal.         Behavior: Behavior normal.         Thought Content: Thought content normal.         Judgment: Judgment normal.         Medications  Current Facility-Administered Medications   Medication Dose Route Frequency Provider Last Rate Last Admin   • diphenhydrAMINE (BENADRYL) injection 25 mg  25 mg Intravenous Q6HRS PRN Argentina Chase M.D.   25 mg at 03/29/21 1323   • hydrocortisone sodium succinate PF (SOLU-CORTEF) 100 MG injection 100 mg  100 mg Intravenous Q8HRS Ivette Eckert M.D.   100 mg at 03/29/21 1323   • oxyCODONE-acetaminophen (PERCOCET) 5-325 MG per tablet 1 tablet  1 tablet Oral Q8HRS PRN Ivette Eckert M.D.       • LORazepam (ATIVAN) injection 1 mg  1 mg Intravenous Q6HRS PRN Karissa Verduzco M.D.   1 mg at 03/29/21 1323   •  cloNIDine (CATAPRES) tablet 0.1 mg  0.1 mg Oral 4X/DAY PRN Levmc Verduzco M.D.   0.1 mg at 03/28/21 2131   • amLODIPine (NORVASC) tablet 10 mg  10 mg Oral Q EVENING CHAUNCEY RustDJg   10 mg at 03/28/21 1831   • carvedilol (COREG) tablet 25 mg  25 mg Oral BID WITH MEALS Argentina Chase M.D.   25 mg at 03/29/21 0749   • furosemide (LASIX) tablet 40 mg  40 mg Oral BID DIURETIC CHAUNCEY RustDJg   40 mg at 03/29/21 0516   • hydroxychloroquine (Plaquenil) tablet 200 mg  200 mg Oral Q EVENING CHAUNCEY RustDJg   200 mg at 03/28/21 1831   • sucralfate (CARAFATE) 1 GM/10ML suspension 1 g  1 g Oral 4X/DAY ACHS Argentina Chase M.D.   1 g at 03/29/21 1128   • acetaminophen (Tylenol) tablet 650 mg  650 mg Oral Q6HRS PRN Argentina Chase M.D.   650 mg at 03/29/21 1323   • ondansetron (ZOFRAN) syringe/vial injection 4 mg  4 mg Intravenous Q4HRS PRN Argentina Chase M.D.   4 mg at 03/29/21 0856   • ondansetron (ZOFRAN ODT) dispertab 4 mg  4 mg Oral Q4HRS PRN Argentina Chase M.D.   Stopped at 03/27/21 0837   • promethazine (PHENERGAN) tablet 12.5-25 mg  12.5-25 mg Oral Q4HRS PRN Argentina Chase M.D.       • promethazine (PHENERGAN) suppository 12.5-25 mg  12.5-25 mg Rectal Q4HRS PRN Argentina Chase M.D.       • prochlorperazine (COMPAZINE) injection 5-10 mg  5-10 mg Intravenous Q4HRS PRN Argentina Chase M.D.   10 mg at 03/28/21 0135   • senna-docusate (PERICOLACE or SENOKOT S) 8.6-50 MG per tablet 2 tablet  2 tablet Oral BID Argentina Chase M.D.   Stopped at 03/27/21 0600    And   • polyethylene glycol/lytes (MIRALAX) PACKET 1 Packet  1 Packet Oral QDAY PRN Argentina Chase M.D.        And   • magnesium hydroxide (MILK OF MAGNESIA) suspension 30 mL  30 mL Oral QDAY PRN Argentina Chase M.D.        And   • bisacodyl (DULCOLAX) suppository 10 mg  10 mg Rectal QDAY PRN Argentina Chase M.D.       • omeprazole  (PRILOSEC) capsule 40 mg  40 mg Oral BID Kiersten Louis A.P.R.NJg   40 mg at 03/29/21 0516   • mycophenolate (MYFORTIC) TBEC 180 mg  180 mg Oral Q EVENING Juanjo Wilson, D.O.   180 mg at 03/28/21 1800       Fluids    Intake/Output Summary (Last 24 hours) at 3/29/2021 1509  Last data filed at 3/29/2021 0900  Gross per 24 hour   Intake 1545 ml   Output 3186 ml   Net -1641 ml       Laboratory  Recent Labs     03/28/21  2118   ISTATAPH 7.451   ISTATAPCO2 42.9*   ISTATAPO2 66   ISTATATCO2 31   LJOROBO1YKL 93   ISTATARTHCO3 29.9*   ISTATARTBE 5*   ISTATTEMP 99.4 F   ISTATFIO2 100   ISTATSPEC Arterial   ISTATAPHTC 7.444   PCIBGCIB5LZ 68         Recent Labs     03/28/21 2010 03/29/21 0230 03/29/21  1110   SODIUM 128* 132* 133*   POTASSIUM 4.9 4.1 4.4   CHLORIDE 89* 93* 93*   CO2 27 28 30   BUN 51* 27* 34*   CREATININE 6.07* 3.05* 3.80*   MAGNESIUM  --  1.8  --    PHOSPHORUS  --  3.1  --    CALCIUM 8.6 9.2 8.9     Recent Labs     03/27/21  0155 03/28/21  0320 03/28/21 2010 03/29/21  0230 03/29/21  1110   ALTSGPT 7  --  6  --  6   ASTSGOT 18  --  15  --  14   ALKPHOSPHAT 67  --  62  --  54   TBILIRUBIN 0.6  --  0.5  --  0.6   LIPASE 33  --   --   --   --    GLUCOSE 93   < > 90 106* 127*    < > = values in this interval not displayed.     Recent Labs     03/27/21  0155 03/27/21  0346 03/28/21 0320 03/28/21 2010 03/29/21  0230 03/29/21  1110   WBC 6.7   < > 3.9* 10.4 10.9*  --    NEUTSPOLYS 84.60*  --   --   --  96.20*  --    LYMPHOCYTES 10.20*  --   --   --  2.50*  --    MONOCYTES 3.90  --   --   --  0.90  --    EOSINOPHILS 0.30  --   --   --  0.10  --    BASOPHILS 0.60  --   --   --  0.10  --    ASTSGOT 18  --   --  15  --  14   ALTSGPT 7  --   --  6  --  6   ALKPHOSPHAT 67  --   --  62  --  54   TBILIRUBIN 0.6  --   --  0.5  --  0.6    < > = values in this interval not displayed.     Recent Labs     03/27/21  0155 03/27/21  0346 03/28/21  0320 03/28/21  0802 03/28/21 2010 03/29/21  0230   RBC 3.48*   < > 3.09*   --  3.57* 3.45*   HEMOGLOBIN 9.1*   < > 8.1*  --  9.4* 9.1*   HEMATOCRIT 30.8*   < > 27.8*  --  31.9* 30.1*   PLATELETCT 49*   < > 31*  --  70* 43*   PROTHROMBTM 14.4  --   --   --   --  13.6   APTT 30.1  --   --  27.1  --   --    INR 1.15*  --   --   --   --  1.07    < > = values in this interval not displayed.       Imaging  Markedly improved b/l infiltrates    Assessment/Plan  * Upper GI bleed- (present on admission)  Assessment & Plan  -- Recurrent GI bleed related nida keyes tear. Other ddx include AV malformation vs dieulafoy lesion vs gastritis   -- See GI note  -- PPI bid * 8 weeks and sucralfate 1 gm qid  -- EGD deffered  -- Follow up with Dr. Turner 4/5      Anemia associated with acute blood loss  Assessment & Plan  -- Multifactorial related to ESRD,recent GI bleed from Hemorrhagic Gastritis and Nida-Keyes tear that was identified on 3/19/2021  -- Trend H/H q6hr   -- Goal Hb > 7      ESRD (end stage renal disease) on dialysis (HCC)- (present on admission)  Assessment & Plan  -- s/p emergent HD and done well  -- routine HD and removal of fluid to maintain dry weight      Hemoptysis  Assessment & Plan  -- resolved with removal of fluid  -- stopped ABx as CXR markedly improved  -- monitor on telemetry      Acute respiratory failure with hypoxemia (HCC)  Assessment & Plan  -- markedly improved with dialysis and removal of 2.5 l  -- suggests TRALI vs TACO   -- no further hemoptysis  -- seems unlikely DAH so stopped gram of solumedrol and changed to stress dose solumedrol and can revert to home dose in am   -- HD to help with volume  -- EF 45% but will need to be reassessed in 4-6 weeks      Thrombocytopenia (HCC)- (present on admission)  Assessment & Plan  -- Secondary to SLE and bleed    -- Avoid transfusion due to high risk of TRALI vs TACO   -- Goal PLT > 20k unless there is evidence of active bleed       HTN (hypertension)  Assessment & Plan  -- Current SBP ~150  -- home BP meds and HD fluid  removal        transfer to telemetry       I have performed a physical exam and reviewed and updated ROS and Plan today (3/29/2021). In review of yesterday's note (3/28/2021), there are no changes except as documented above.     Discussed patient with patient and multidisciplinary

## 2021-03-29 NOTE — PROGRESS NOTES
Report called to Althea MARTINEZ. Pt transported via WC on 3L Oxymask. Pt has all belongings including glasses and cell phone as well as Ipad. Pt previously medicated for pain pt currently at 6/10 in lower back. Pt does not appear to be in acute distress.

## 2021-03-29 NOTE — PROGRESS NOTES
STAT HD ordered by Dr Trent started at 2354 and ended at 0254 witj net UF of 2636 ml as bp tolerated. Episode of bp dropping close to the end of treatment, UF turned off and went back up. Pt was asymptomatic. Cannulated right upper arm AVF x 2 using 15 gauge needles without problem. Post treatment, held sites for 20 minutes. No bleeding noted at access site. Gave report to Shira primary RN. See flow sheet for details.

## 2021-03-29 NOTE — PROGRESS NOTES
Responded to a rapid response tonight on this patient around 8 PM.  The patient's rapid response was called because of shortness of breath.  The patient is dropping down to the 70 to 80% oxygen saturation on room air and then even on 4 L by nasal cannula..  She had to be placed on a 15 L nonrebreather.  Even with the nonrebreather we were only able to get her oxygenation up to about 89%.  The patient was evaluated urgently and was found to have worsening bilateral patchy infiltrates consistent with lupus flareup.  At this point I am going to put her on high-dose Solu-Medrol 1 g IV daily.  Apparently she developed severe anxiety from Solu-Medrol thus we will give her Ativan with this.  I have spoken with nephrology regarding her case they are available to give dialysis although I do not believe at this point she is fluid or overloaded as it appears more like a lupus flare.  The patient will need the lupus flare to be managed and at this point because of the respiratory failure she will need BiPAP if she fails the BiPAP we will need to intubate her mechanically ventilator.  I have talked to the mother as well as the patient with a  in detail and discussed all these details with them.  I also informed her that we may need to be transferring her to [place] and this will need to be arranged in the morning.  We will try to have her primary rheumatologist maybe come to the hospital tomorrow was Dr. Wong.  Apparently they are not available tonight and they do not have on-call coverage.  In the meantime I will also notify the intensivist.  At this point patient is critically ill I have spent 45 minutes of critical care time face-to-face with the patient and her mother discussing and planning out care for her.  We will place her at this point on ACE BiPAP transferred to the ICU and initiate Solu-Medrol IV 1 g every 24 hours for at least 3 doses.

## 2021-03-29 NOTE — CARE PLAN
Problem: Communication  Goal: The ability to communicate needs accurately and effectively will improve  Outcome: PROGRESSING AS EXPECTED     Problem: Infection  Goal: Will remain free from infection  Outcome: PROGRESSING AS EXPECTED     Problem: Respiratory:  Goal: Respiratory status will improve  Outcome: PROGRESSING AS EXPECTED

## 2021-03-29 NOTE — ASSESSMENT & PLAN NOTE
-- Recurrent GI bleed related nida thao tear. Other ddx include AV malformation vs dieulafoy lesion vs gastritis   -- See GI note  -- PPI bid * 8 weeks and sucralfate 1 gm qid  -- EGD deffered  -- Follow up with Dr. Turner 4/5

## 2021-03-30 VITALS
HEIGHT: 67 IN | WEIGHT: 134.92 LBS | RESPIRATION RATE: 18 BRPM | DIASTOLIC BLOOD PRESSURE: 104 MMHG | OXYGEN SATURATION: 100 % | SYSTOLIC BLOOD PRESSURE: 158 MMHG | BODY MASS INDEX: 21.18 KG/M2 | TEMPERATURE: 98.1 F | HEART RATE: 73 BPM

## 2021-03-30 PROBLEM — J96.01 ACUTE RESPIRATORY FAILURE WITH HYPOXEMIA (HCC): Status: RESOLVED | Noted: 2021-03-19 | Resolved: 2021-03-30

## 2021-03-30 LAB
1 3 BETA D GLUCAN INTERP Q4483: NORMAL
1,3 BETA GLUCAN SER-MCNC: NORMAL PG/ML
B2 MICROGLOB SERPL-MCNC: 28 MG/L (ref 1.1–2.4)
C3 SERPL-MCNC: 90.2 MG/DL (ref 87–200)
C4 SERPL-MCNC: 10.8 MG/DL (ref 19–52)
DSDNA AB TITR SER CLIF: NORMAL {TITER}

## 2021-03-30 PROCEDURE — 99233 SBSQ HOSP IP/OBS HIGH 50: CPT | Performed by: INTERNAL MEDICINE

## 2021-03-30 PROCEDURE — 700111 HCHG RX REV CODE 636 W/ 250 OVERRIDE (IP): Performed by: HOSPITALIST

## 2021-03-30 PROCEDURE — 99239 HOSP IP/OBS DSCHRG MGMT >30: CPT | Performed by: INTERNAL MEDICINE

## 2021-03-30 PROCEDURE — 700102 HCHG RX REV CODE 250 W/ 637 OVERRIDE(OP): Performed by: NURSE PRACTITIONER

## 2021-03-30 PROCEDURE — A9270 NON-COVERED ITEM OR SERVICE: HCPCS | Performed by: HOSPITALIST

## 2021-03-30 PROCEDURE — A9270 NON-COVERED ITEM OR SERVICE: HCPCS | Performed by: NURSE PRACTITIONER

## 2021-03-30 PROCEDURE — 700111 HCHG RX REV CODE 636 W/ 250 OVERRIDE (IP): Performed by: INTERNAL MEDICINE

## 2021-03-30 PROCEDURE — 700102 HCHG RX REV CODE 250 W/ 637 OVERRIDE(OP): Performed by: HOSPITALIST

## 2021-03-30 RX ORDER — FUROSEMIDE 40 MG/1
160 TABLET ORAL
Status: DISCONTINUED | OUTPATIENT
Start: 2021-03-30 | End: 2021-03-30 | Stop reason: HOSPADM

## 2021-03-30 RX ORDER — FUROSEMIDE 80 MG
160 TABLET ORAL 2 TIMES DAILY
Qty: 120 TABLET | Refills: 0 | Status: SHIPPED | OUTPATIENT
Start: 2021-03-30 | End: 2021-04-29

## 2021-03-30 RX ADMIN — CARVEDILOL 25 MG: 25 TABLET, FILM COATED ORAL at 07:47

## 2021-03-30 RX ADMIN — OMEPRAZOLE 40 MG: 20 CAPSULE, DELAYED RELEASE ORAL at 05:54

## 2021-03-30 RX ADMIN — HYDROMORPHONE HYDROCHLORIDE 0.5 MG: 1 INJECTION, SOLUTION INTRAMUSCULAR; INTRAVENOUS; SUBCUTANEOUS at 08:26

## 2021-03-30 RX ADMIN — HYDROCORTISONE SODIUM SUCCINATE 100 MG: 100 INJECTION, POWDER, FOR SOLUTION INTRAMUSCULAR; INTRAVENOUS at 06:10

## 2021-03-30 RX ADMIN — FUROSEMIDE 40 MG: 40 TABLET ORAL at 05:54

## 2021-03-30 RX ADMIN — ONDANSETRON 4 MG: 2 INJECTION INTRAMUSCULAR; INTRAVENOUS at 05:57

## 2021-03-30 RX ADMIN — SUCRALFATE 1 G: 1 SUSPENSION ORAL at 07:54

## 2021-03-30 RX ADMIN — DIPHENHYDRAMINE HYDROCHLORIDE 25 MG: 50 INJECTION, SOLUTION INTRAMUSCULAR; INTRAVENOUS at 05:54

## 2021-03-30 RX ADMIN — LORAZEPAM 1 MG: 2 INJECTION INTRAMUSCULAR; INTRAVENOUS at 05:54

## 2021-03-30 ASSESSMENT — ENCOUNTER SYMPTOMS
FEVER: 0
ABDOMINAL PAIN: 0
SHORTNESS OF BREATH: 0

## 2021-03-30 ASSESSMENT — PAIN DESCRIPTION - PAIN TYPE
TYPE: ACUTE PAIN
TYPE: ACUTE PAIN

## 2021-03-30 NOTE — PROCEDURES
Diagnosis: End-Stage Renal Disease admitted with hemoptysis. Patient required emergent HD overnight for SOB and hypoxia with 2.6L UF. HD done again this afternoon with another 2.5L UF goal. Patient seen and examined on hemodialysis during treatment. Patient is stable, tolerating hemodialysis. Denies chest pain and shortness of breath. Orders updated as needed. Please refer to flowsheet for full details.    Access: R BC AVF  UF goal: 2.5L    Plan: HD x2 today for hypoxic respiratory failure. Monitor daily for HD needs, but likely plan on Monday Wednesday Friday HD.     Navin Metz MD  Nephrology

## 2021-03-30 NOTE — DISCHARGE PLANNING
Anticipated Discharge Disposition:   Home when medically cleared     Action:   Chart review complete.    Discussed patient's plan of care and plans for discharge during rounds. Per MD, patient may discharge home later today if tolerating diet. No discharge needs identified at this time. RN CM will continue to follow and assist.     Barriers to Discharge:   Medical Clearance    Plan:   Await medical clearance   Hospital care management will continue to assist with discharge planning needs.     Care Transition Team Assessment    In the event of an emergency, the patient's NOK is the patient's mother, Chikis Nicole.     Information Source  Orientation : Oriented x 4  Information Given By: Other (Comments)(chart )  Who is responsible for making decisions for patient? : Patient    Elopement Risk  Legal Hold: No  Ambulatory or Self Mobile in Wheelchair: No-Not an Elopement Risk  Disoriented: No  Psychiatric Symptoms: None  History of Wandering: No  Elopement this Admit: No  Vocalizing Wanting to Leave: No  Displays Behaviors, Body Language Wanting to Leave: No-Not at Risk for Elopement  Elopement Risk: Not at Risk for Elopement    Interdisciplinary Discharge Planning  Does Admitting Nurse Feel This Could be a Complex Discharge?: No  Primary Care Physician: TIAN NICHOLSON M.D.  Lives with - Patient's Self Care Capacity: Adult Children, Parents  Patient or legal guardian wants to designate a caregiver: No  Support Systems: Family Member(s)  Housing / Facility: [address]  Do You Take your Prescribed Medications Regularly: Yes  Able to Return to Previous ADL's: Yes  Mobility Issues: No  Prior Services: None  Patient Prefers to be Discharged to:: home  Assistance Needed: Unknown at this Time    Discharge Preparedness  What is your plan after discharge?: Home with help  What are your discharge supports?: Parent  Prior Functional Level: Ambulatory    Functional Assesment  Prior Functional Level:  Ambulatory    Finances  Financial Barriers to Discharge: No  Prescription Coverage: Yes    Vision / Hearing Impairment  Vision Impairment : No  Right Eye Vision: Wears Glasses, Impaired  Left Eye Vision: Wears Glasses, Impaired  Hearing Impairment : No    Advance Directive  Advance Directive?: None    Domestic Abuse  Have you ever been the victim of abuse or violence?: No  Physical Abuse or Sexual Abuse: No  Verbal Abuse or Emotional Abuse: No  Possible Abuse/Neglect Reported to:: Not Applicable    Psychological Assessment  History of Substance Abuse: None  History of Psychiatric Problems: No    Discharge Risks or Barriers  Discharge risks or barriers?: Complex medical needs  Patient risk factors: Complex medical needs    Anticipated Discharge Information  Discharge Disposition: Still a Patient (30)

## 2021-03-30 NOTE — PROGRESS NOTES
Patient received discharged packet, transported via wheelchair and 3L oxygen in wheelchair with transporter. Patient denies pain/discomfort/not in distress. Patient left floor approximately this time. Patient reported having all belongings and none missing.

## 2021-03-30 NOTE — PROGRESS NOTES
Telemetry Shift Summary     Rhythm SR  HR Range 71 - 91  Ectopy R PVC  Measurements 0.18 / 0.10 / 0.36           Normal Values  Rhythm SR  HR Range    Measurements 0.12-0.20 / 0.06-0.10  / 0.30-0.52

## 2021-03-30 NOTE — CARE PLAN
Problem: Communication  Goal: The ability to communicate needs accurately and effectively will improve  Outcome: PROGRESSING AS EXPECTED  -Questions about discharge planning answered/concerns are encouraged     Problem: Safety  Goal: Will remain free from injury  Outcome: PROGRESSING AS EXPECTED  -Follows instructions well, uses call light appropriately, appreciates staff for trying to keep her safe/preventing falls

## 2021-03-30 NOTE — PROGRESS NOTES
Nephrology Daily Progress Note    Date of Service  3/30/2021    Chief Complaint  25 y.o. female with lupus, ESRD on HD MWF admitted 3/27/2021 with hemoptysis    Interval Problem Update  3/30 -patient had 2 treatments of dialysis in the last 36 hours, with a total of about 5 L removed.  Patient denies chest pain, shortness of breath.  Hoping to go home.    Review of Systems  Review of Systems   Constitutional: Negative for fever.   Respiratory: Negative for shortness of breath.    Cardiovascular: Negative for chest pain.   Gastrointestinal: Negative for abdominal pain.   All other systems reviewed and are negative.       Physical Exam  Temp:  [36 °C (96.8 °F)-36.6 °C (97.9 °F)] 36.3 °C (97.4 °F)  Pulse:  [80-95] 86  Resp:  [17-32] 17  BP: (139-190)/() 160/87  SpO2:  [91 %-100 %] 91 %    Physical Exam   Constitutional: She is oriented to person, place, and time. She appears well-developed. No distress.   HENT:   Mouth/Throat: Oropharynx is clear and moist. No oropharyngeal exudate.   Eyes: EOM are normal. No scleral icterus.   Neck: No tracheal deviation present.   Cardiovascular: Normal heart sounds.   No murmur heard.  Pulmonary/Chest: Effort normal. No stridor. She has no rales.   Abdominal: Soft. There is no abdominal tenderness.   Musculoskeletal:         General: No edema. Normal range of motion.   Neurological: She is alert and oriented to person, place, and time.   Skin: Skin is warm and dry.   Psychiatric: She has a normal mood and affect.   Access: Right brachiocephalic AV fistula with patent bruit and thrill    Fluids    Intake/Output Summary (Last 24 hours) at 3/30/2021 0650  Last data filed at 3/30/2021 0600  Gross per 24 hour   Intake 1140 ml   Output 3000 ml   Net -1860 ml       Laboratory  Labs reviewed, pertinent labs below.  Recent Labs     03/28/21  0320 03/28/21 2010 03/29/21  0230   WBC 3.9* 10.4 10.9*   RBC 3.09* 3.57* 3.45*   HEMOGLOBIN 8.1* 9.4* 9.1*   HEMATOCRIT 27.8* 31.9* 30.1*   MCV  90.0 89.4 87.2   MCH 26.2* 26.3* 26.4*   MCHC 29.1* 29.5* 30.2*   RDW 62.0* 62.1* 60.0*   PLATELETCT 31* 70* 43*     Recent Labs     03/28/21 2010 03/29/21  0230 03/29/21  1110   SODIUM 128* 132* 133*   POTASSIUM 4.9 4.1 4.4   CHLORIDE 89* 93* 93*   CO2 27 28 30   GLUCOSE 90 106* 127*   BUN 51* 27* 34*   CREATININE 6.07* 3.05* 3.80*   CALCIUM 8.6 9.2 8.9     Recent Labs     03/28/21  0802 03/29/21  0230   APTT 27.1  --    INR  --  1.07           URINALYSIS:  Lab Results   Component Value Date/Time    COLORURINE Yellow 02/15/2021 1108    CLARITY Clear 02/15/2021 1108    SPECGRAVITY 1.015 02/15/2021 1108    PHURINE 8.5 (A) 02/15/2021 1108    KETONES Negative 02/15/2021 1108    PROTEINURIN 30 (A) 02/15/2021 1108    BILIRUBINUR Negative 02/15/2021 1108    UROBILU 0.2 07/16/2020 0900    NITRITE Negative 02/15/2021 1108    LEUKESTERAS Negative 02/15/2021 1108    OCCULTBLOOD Trace (A) 02/15/2021 1108     UPC  Lab Results   Component Value Date/Time    TOTPROTUR 45.0 (H) 07/16/2020 0900      Lab Results   Component Value Date/Time    CREATININEU 18.93 07/16/2020 0900         Imaging interpreted by radiologist. Imaging reports reviewed with pertinent findings below  EC-ECHOCARDIOGRAM LTD W/O CONT   Final Result      DX-CHEST-PORTABLE (1 VIEW)   Final Result      Interval improvement in bilateral pulmonary opacities      DX-CHEST-PORTABLE (1 VIEW)   Final Result         1.  Extensive bilateral pulmonary parenchymal opacities are compatible with ARDS, severe pulmonary edema, and/or superimposed pulmonary infiltrates; findings increased since prior exam.      CT-RENAL COLIC EVALUATION(A/P W/O)   Final Result      1.  Lung bases show interstitial and extensive alveolar opacities as well as small effusions, likely pulmonary edema.  Superimposed pneumonia is not excluded.   2.  Atrophic kidneys.   3.  No CT evidence for appendicitis.   4.  Focally dilated small bowel loop in LEFT lower quadrant of uncertain significance.  No  definite bowel obstruction.   5.  Periumbilical subcutaneous edema of uncertain significance.      DX-CHEST-PORTABLE (1 VIEW)   Final Result      1.  Worsening bilateral interstitial and alveolar opacities, edema versus pneumonia.   2.  Minimal bilateral pleural effusions, increased from prior.   3.  No pneumoperitoneum demonstrated.            Current Facility-Administered Medications   Medication Dose Route Frequency Provider Last Rate Last Admin   • diphenhydrAMINE (BENADRYL) injection 25 mg  25 mg Intravenous Q6HRS PRN Argentina Chase M.D.   25 mg at 03/30/21 0554   • hydrocortisone sodium succinate PF (SOLU-CORTEF) 100 MG injection 100 mg  100 mg Intravenous Q8HRS Ivette Eckert M.D.   100 mg at 03/30/21 0610   • HYDROmorphone (Dilaudid) injection 0.5 mg  0.5 mg Intravenous Q4HRS PRN Ivette Eckert M.D.   0.5 mg at 03/29/21 1619   • LORazepam (ATIVAN) injection 1 mg  1 mg Intravenous Q6HRS PRN Karissa Verduzco M.D.   1 mg at 03/30/21 0554   • cloNIDine (CATAPRES) tablet 0.1 mg  0.1 mg Oral 4X/DAY PRN Karissa Verduzco M.D.   0.1 mg at 03/29/21 2346   • amLODIPine (NORVASC) tablet 10 mg  10 mg Oral Q EVENING Argentina Chase M.D.   10 mg at 03/29/21 1822   • carvedilol (COREG) tablet 25 mg  25 mg Oral BID WITH MEALS Argentina Chase M.D.   25 mg at 03/29/21 1823   • furosemide (LASIX) tablet 40 mg  40 mg Oral BID DIURETIC Argentina Chase M.D.   40 mg at 03/30/21 0554   • hydroxychloroquine (Plaquenil) tablet 200 mg  200 mg Oral Q EVENING Argentina Chase M.D.   200 mg at 03/29/21 1823   • sucralfate (CARAFATE) 1 GM/10ML suspension 1 g  1 g Oral 4X/DAY ACHS Argentina Chase M.D.   1 g at 03/29/21 1822   • acetaminophen (Tylenol) tablet 650 mg  650 mg Oral Q6HRS PRN Argentina Chase M.D.   650 mg at 03/29/21 1323   • ondansetron (ZOFRAN) syringe/vial injection 4 mg  4 mg Intravenous Q4HRS PRN Argentina Chase M.D.   Stopped at 03/30/21 0558   •  ondansetron (ZOFRAN ODT) dispertab 4 mg  4 mg Oral Q4HRS PRN Argentina Chase M.D.   Stopped at 03/27/21 0837   • promethazine (PHENERGAN) tablet 12.5-25 mg  12.5-25 mg Oral Q4HRS PRN Argentina Chase M.D.       • promethazine (PHENERGAN) suppository 12.5-25 mg  12.5-25 mg Rectal Q4HRS PRN Argentina Chase M.D.       • prochlorperazine (COMPAZINE) injection 5-10 mg  5-10 mg Intravenous Q4HRS PRN Argentina Chase M.D.   10 mg at 03/29/21 1820   • senna-docusate (PERICOLACE or SENOKOT S) 8.6-50 MG per tablet 2 tablet  2 tablet Oral BID Argentina Chase M.D.   Stopped at 03/27/21 0600    And   • polyethylene glycol/lytes (MIRALAX) PACKET 1 Packet  1 Packet Oral QDAY PRN Argentina Chase M.D.        And   • magnesium hydroxide (MILK OF MAGNESIA) suspension 30 mL  30 mL Oral QDAY PRN Argentina Chase M.D.        And   • bisacodyl (DULCOLAX) suppository 10 mg  10 mg Rectal QDAY PRN Argentina Chase M.D.       • omeprazole (PRILOSEC) capsule 40 mg  40 mg Oral BID Kiersten Louis, A.P.R.N.   40 mg at 03/30/21 0554   • mycophenolate (MYFORTIC) TBEC 180 mg  180 mg Oral Q EVENING Juanjo Wilson D.O.   180 mg at 03/29/21 1800         Assessment/Plan  25 y.o. female with lupus, ESRD on HD MWF admitted 3/27/2021 with hemoptysis    1.  ESRD on hemodialysis Monday Wednesday Friday.  Oliguric.  No acute need for dialysis today, likely plan on dialysis on Monday Wednesday Friday schedule.  Avoid nephrotoxins.  Check labs daily.    2.  Access: Right brachiocephalic AV fistula, patent.  Continue right arm precautions.    3.  Hemoptysis, present on admission, unclear etiology.  Patient was being evaluated by pulmonary for diffuse alveolar hemorrhage, started on steroids.  Will defer steroid plan and possible bronchoscopy to pulmonary team.    4.  Systemic lupus erythematosus, active.  Treated by rheumatology, Dr. Wong.  Started on Benlysta in the last few weeks.    5.  Acute  on chronic anemia of chronic disease.  I will order Epogen 12,000 units thrice weekly with dialysis.    6.  Thrombocytopenia, persistent.  Unclear etiology except possibly due to lupus flare.  Check CBC daily.    7.  Hypertension, uncontrolled.  Increase furosemide to 160 mg p.o. twice daily, and continue this increased dose on discharge.    OK to discharge from Nephrology standpoint.  There is no need for outpatient nephrology clinic follow up appointment, as the patient will be seen by a nephrologist at their outpatient dialysis center.       Navin Metz MD  Nephrology

## 2021-03-30 NOTE — PROGRESS NOTES
Notified Eric Cooney of positive microbiology lab notification at 0903, informed her that urine culture collected on 3/28 was postive e.coli ESBL.

## 2021-03-30 NOTE — PROGRESS NOTES
Received bedside patient report from MICHELLE Oh. Patient resting comfortably in bed, no complaints at this time. Safety precautions in place. Will continue to monitor.

## 2021-03-30 NOTE — DISCHARGE INSTRUCTIONS
Furosemide tablets  What is this medicine?  FUROSEMIDE ([place] se mide) is a diuretic. It helps you make more urine and to lose salt and excess water from your body. This medicine is used to treat high blood pressure, and edema or swelling from heart, kidney, or liver disease.  This medicine may be used for other purposes; ask your health care provider or pharmacist if you have questions.  COMMON BRAND NAME(S): Active-Medicated Specimen Kit, Delone, Diuscreen, Lasix, RX Specimen Collection Kit, Specimen Collection Kit, URINX Medicated Specimen Collection  What should I tell my health care provider before I take this medicine?  They need to know if you have any of these conditions:  · abnormal blood electrolytes  · diarrhea or vomiting  · gout  · heart disease  · kidney disease, small amounts of urine, or difficulty passing urine  · liver disease  · thyroid disease  · an unusual or allergic reaction to furosemide, sulfa drugs, other medicines, foods, dyes, or preservatives  · pregnant or trying to get pregnant  · breast-feeding  How should I use this medicine?  Take this medicine by mouth with a glass of water. Follow the directions on the prescription label. You may take this medicine with or without food. If it upsets your stomach, take it with food or milk. Do not take your medicine more often than directed. Remember that you will need to pass more urine after taking this medicine. Do not take your medicine at a time of day that will cause you problems. Do not take at bedtime.  Talk to your pediatrician regarding the use of this medicine in children. While this drug may be prescribed for selected conditions, precautions do apply.  Overdosage: If you think you have taken too much of this medicine contact a poison control center or emergency room at once.  NOTE: This medicine is only for you. Do not share this medicine with others.  What if I miss a dose?  If you miss a dose, take it as soon as you can. If it is  almost time for your next dose, take only that dose. Do not take double or extra doses.  What may interact with this medicine?  · aspirin and aspirin-like medicines  · certain antibiotics  · chloral hydrate  · cisplatin  · cyclosporine  · digoxin  · diuretics  · laxatives  · lithium  · medicines for blood pressure  · medicines that relax muscles for surgery  · methotrexate  · NSAIDs, medicines for pain and inflammation like ibuprofen, naproxen, or indomethacin  · phenytoin  · steroid medicines like prednisone or cortisone  · sucralfate  · thyroid hormones  This list may not describe all possible interactions. Give your health care provider a list of all the medicines, herbs, non-prescription drugs, or dietary supplements you use. Also tell them if you smoke, drink alcohol, or use illegal drugs. Some items may interact with your medicine.  What should I watch for while using this medicine?  Visit your doctor or health care provider for regular checks on your progress. Check your blood pressure regularly. Ask your doctor or health care provider what your blood pressure should be, and when you should contact him or her. If you are a diabetic, check your blood sugar as directed.  This medicine may cause serious skin reactions. They can happen weeks to months after starting the medicine. Contact your health care provider right away if you notice fevers or flu-like symptoms with a rash. The rash may be red or purple and then turn into blisters or peeling of the skin. Or, you might notice a red rash with swelling of the face, lips or lymph nodes in your neck or under your arms.  You may need to be on a special diet while taking this medicine. Check with your doctor. Also, ask how many glasses of fluid you need to drink a day. You must not get dehydrated.  You may get drowsy or dizzy. Do not drive, use machinery, or do anything that needs mental alertness until you know how this drug affects you. Do not stand or sit up  quickly, especially if you are an older patient. This reduces the risk of dizzy or fainting spells. Alcohol can make you more drowsy and dizzy. Avoid alcoholic drinks.  This medicine can make you more sensitive to the sun. Keep out of the sun. If you cannot avoid being in the sun, wear protective clothing and use sunscreen. Do not use sun lamps or tanning beds/booths.  What side effects may I notice from receiving this medicine?  Side effects that you should report to your doctor or health care professional as soon as possible:  · blood in urine or stools  · dry mouth  · fever or chills  · hearing loss or ringing in the ears  · irregular heartbeat  · muscle pain or weakness, cramps  · rash, fever, and swollen lymph nodes  · redness, blistering, peeling or loosening of the skin, including inside the mouth  · skin rash  · stomach upset, pain, or nausea  · tingling or numbness in the hands or feet  · unusually weak or tired  · vomiting or diarrhea  · yellowing of the eyes or skin  Side effects that usually do not require medical attention (report to your doctor or health care professional if they continue or are bothersome):  · headache  · loss of appetite  · unusual bleeding or bruising  This list may not describe all possible side effects. Call your doctor for medical advice about side effects. You may report side effects to FDA at 3-500-FDA-2183.  Where should I keep my medicine?  Keep out of the reach of children.  Store at room temperature between 15 and 30 degrees C (59 and 86 degrees F). Protect from light. Throw away any unused medicine after the expiration date.  NOTE: This sheet is a summary. It may not cover all possible information. If you have questions about this medicine, talk to your doctor, pharmacist, or health care provider.  © 2020 [employer]vier[employer] (2020-03-20 14:04:13)  Discharge Instructions    Discharged to home by car with relative. Discharged via wheelchair, hospital escort: Yes.  Special  equipment needed: Oxygen    Be sure to schedule a follow-up appointment with your primary care doctor or any specialists as instructed.     Discharge Plan:  Influenza Vaccine Indication: Patient Refuses    I understand that a diet low in cholesterol, fat, and sodium is recommended for good health. Unless I have been given specific instructions below for another diet, I accept this instruction as my diet prescription.   Other diet: Renal Diet  Special Instructions: None    · Is patient discharged on Warfarin / Coumadin?   No   Gastrointestinal Bleeding  Gastrointestinal (GI) bleeding is bleeding somewhere along the path that food travels through the body (digestive tract). This path is anywhere between the mouth and the opening of the butt (anus). You may have blood in your poop (stool) or have black poop. If you throw up (vomit), there may be blood in it.  This condition can be mild, serious, or even life-threatening. If you have a lot of bleeding, you may need to stay in the hospital.  What are the causes?  This condition may be caused by:  · Irritation and swelling of the esophagus (esophagitis). The esophagus is part of the body that moves food from your mouth to your stomach.  · Swollen veins in the butt (hemorrhoids).  · Areas of painful tearing in the opening of the butt (anal fissures). These are often caused by passing hard poop.  · Pouches that form on the colon over time (diverticulosis).  · Irritation and swelling (diverticulitis) in areas where pouches have formed on the colon.  · Growths (polyps) or cancer. Colon cancer often starts out as growths that are not cancer.  · Irritation of the stomach lining (gastritis).  · Sores (ulcers) in the stomach.  What increases the risk?  You are more likely to develop this condition if you:  · Have a certain type of infection in your stomach (Helicobacter pylori infection).  · Take certain medicines.  · Smoke.  · Drink alcohol.  What are the signs or  symptoms?  Common symptoms of this condition include:  · Throwing up (vomiting) material that has bright red blood in it. It may look like coffee grounds.  · Changes in your poop. The poop may:  ? Have red blood in it.  ? Be black, look like tar, and smell stronger than normal.  ? Be red.  · Pain or cramping in the belly (abdomen).  How is this treated?  Treatment for this condition depends on the cause of the bleeding. For example:  · Sometimes, the bleeding can be stopped during a procedure that is done to find the problem (endoscopy or colonoscopy).  · Medicines can be used to:  ? Help control irritation, swelling, or infection.  ? Reduce acid in your stomach.  · Certain problems can be treated with:  ? Creams.  ? Medicines that are put in the butt (suppositories).  ? Warm baths.  · Surgery is sometimes needed.  · If you lose a lot of blood, you may need a blood transfusion.  If bleeding is mild, you may be allowed to go home. If there is a lot of bleeding, you will need to stay in the hospital.  Follow these instructions at home:    · Take over-the-counter and prescription medicines only as told by your doctor.  · Eat foods that have a lot of fiber in them. These foods include beans, whole grains, and fresh fruits and vegetables. You can also try eating 1-3 prunes each day.  · Drink enough fluid to keep your pee (urine) pale yellow.  · Keep all follow-up visits as told by your doctor. This is important.  Contact a doctor if:  · Your symptoms do not get better.  Get help right away if:  · Your bleeding does not stop.  · You feel dizzy or you pass out (faint).  · You feel weak.  · You have very bad cramps in your back or belly.  · You pass large clumps of blood (clots) in your poop.  · Your symptoms are getting worse.  · You have chest pain or fast heartbeats.  Summary  · GI bleeding is bleeding somewhere along the path that food travels through the body (digestive tract).  · This bleeding can be caused by many  things. Treatment depends on the cause of the bleeding.  · Take medicines only as told by your doctor.  · Keep all follow-up visits as told by your doctor. This is important.  This information is not intended to replace advice given to you by your health care provider. Make sure you discuss any questions you have with your health care provider.  Document Released: 09/26/2009 Document Revised: 07/31/2019 Document Reviewed: 07/31/2019  [employer] Patient Education © 2020 [employer]      Depression / Suicide Risk    As you are discharged from this [facility] facility, it is important to learn how to keep safe from harming yourself.    Recognize the warning signs:  · Abrupt changes in personality, positive or negative- including increase in energy   · Giving away possessions  · Change in eating patterns- significant weight changes-  positive or negative  · Change in sleeping patterns- unable to sleep or sleeping all the time   · Unwillingness or inability to communicate  · Depression  · Unusual sadness, discouragement and loneliness  · Talk of wanting to die  · Neglect of personal appearance   · Rebelliousness- reckless behavior  · Withdrawal from people/activities they love  · Confusion- inability to concentrate     If you or a loved one observes any of these behaviors or has concerns about self-harm, here's what you can do:  · Talk about it- your feelings and reasons for harming yourself  · Remove any means that you might use to hurt yourself (examples: pills, rope, extension cords, firearm)  · Get professional help from the community (Mental Health, Substance Abuse, psychological counseling)  · Do not be alone:Call your Safe Contact- someone whom you trust who will be there for you.  · Call your local CRISIS HOTLINE 684-1133 or 708-734-7603  · Call your local Children's Mobile Crisis Response Team [place] (983) 810-1784 or www.Plastyc  · Call the toll free National Suicide Prevention Hotlines    · National Suicide Prevention Lifeline 825-539-KRJC (6289)  [facility] 800-SUICIDE (626-3460)      Karen-Keyes Syndrome    Karen-Keyes syndrome refers to bleeding from tears in the lining of the tube that carries food from the mouth to the stomach (esophagus). The tears occur at the entrance to your stomach. Usually the bleeding stops by itself after 24-48 hours. In some cases, surgery may be needed.  What are the causes?  This condition may be caused by severe or long-lasting vomiting or coughing.  What increases the risk?  You are more likely to develop this condition if:  · You abuse or drink too much alcohol.  · You have certain eating disorders, such as eating a lot of food at once and then vomiting to prevent weight gain (bulimia).  What are the signs or symptoms?  Symptoms of this condition include:  · Vomiting bright red or black, coffee-ground-like material.  · Abdominal pain.  · Having black, tarry stools.  · Fainting or experiencing loss of consciousness.  How is this diagnosed?  This condition is diagnosed with an esophagogastroduodenoscopy (EGD). During an EGD procedure, a flexible tube (endoscope) is put into your mouth, passed through your esophagus into your stomach, and then into your small bowel. An EGD will show your health care provider where the tear is.  How is this treated?  Treatment for this condition depends on the severity of the tear or bleeding. It is necessary to stop any bleeding as soon as possible. Treatment includes:  · Medicines to treat nausea.  · Medicines to help the lining of your esophagus heal.  · Having endoscopy to treat an area of bleeding with high heat (coagulation), injections, or surgical clips.  · Receiving donated blood (transfusion) to replace lost blood. This is done in cases of severe bleeding.  · Having a procedure that involves first doing an angiogram and then blocking blood flow to the bleeding site (embolization).  · Having other  surgical procedures if initial treatments do not control bleeding.  In some cases, it may be necessary to treat any conditions that may have caused the tear in the esophagus. This includes:   · Treating long-lasting or recurrent vomiting or coughing.  · Getting help for alcoholism or an eating disorder.  Follow these instructions at home:  · Take over-the-counter and prescription medicines only as told by your health care provider.  · You may be told to stop taking aspirin or ibuprofen as these, and other medicines like them, can cause bleeding.  · Return to your normal activities as told by your health care provider. Ask your health care provider what activities are safe for you.  · Do not use any products that contain nicotine or tobacco, such as cigarettes and e-cigarettes. If you need help quitting, ask your health care provider.  · Follow instructions from your health care provider about eating or drinking restrictions.  · Do not drink alcohol.  · Keep all follow-up visits as told by your health care provider. This is important.  Contact a health care provider if you:  · Have nausea or vomiting.  · Have abdominal pain.  · Have unexplained weight loss.  · Need help to stop smoking or drinking alcohol.  Get help right away if you:  · Have dizziness that does not go away, or you are light-headed or faint.  · Start vomiting again, or you have vomit that is bright red or looks like black coffee grounds.  · Have bloody, black, or tarry stools.  · Have chest pain.  · Cannot eat or drink.  Summary  · Karen-Keyes syndrome refers to bleeding from tears in the lining of the tube that carries food from the mouth to the stomach (esophagus).  · Risk factors include severe or long-lasting coughing or vomiting, and heavy alcohol use.  · Take over-the-counter and prescription medicines only as told by your health care provider. You may have to avoid certain medicines that cause bleeding.  This information is not intended to  replace advice given to you by your health care provider. Make sure you discuss any questions you have with your health care provider.  Document Released: 05/07/2007 Document Revised: 12/31/2018 Document Reviewed: 12/31/2018  Elsevier Patient Education © 2020 Elsevier Inc.

## 2021-03-31 LAB
ANA INTERPRETIVE COMMENT Q5143: ABNORMAL
ANA PATTERN Q5144: ABNORMAL
ANA TITER Q5145: ABNORMAL
ANTINUCLEAR ANTIBODY (ANA), HEP-2, IGG Q5142: DETECTED
NUCLEAR IGG SER QL IA: DETECTED

## 2021-03-31 NOTE — DISCHARGE SUMMARY
"Discharge Summary    CHIEF COMPLAINT ON ADMISSION  Chief Complaint   Patient presents with   • Abdominal Pain       Reason for Admission  Vomiting Blood, Abdominal Pain      Admission Date  3/27/2021    CODE STATUS  Prior    HPI & HOSPITAL COURSE  Per notes, \"25 y.o. 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 Most recently admission was on 3/19/21, again with upper GI bleed, with most recent EGD done by Dr. Mcuqeen redemontrating  Karen-Keyes tear and also \"oozing from several small punctuate lesions in the stomach with diffuse nodular gastritis requiring Argon Plasma Coagulation (APC)\".  She required blood transfusions on both admissions for low hemoglobin and was discharged home on sucralfate 4 times a day for 2 weeks and PPIs.       She presented to the emergency department on 3/27/2021 reporting once again bright red bloody emesis, 2 episodes this evening and worsening epigastric pain, severe and no radiation.\"  She was admitted for acute respiratory distress, secondary to volume overload.  She did require BiPAP and high flow nasal cannula as well as emergent dialysis in the ICU.  It was thought that respiratory distress could be TRALI or pulmonary edema.  She did have significant provement with hemodialysis and complete resolution of respiratory distress.  She was able to be transferred to telemetry floor and return to her baseline supplemental oxygen needs.  Additionally she was evaluated by gastroenterology due to possible GI bleed and anemia, daily EGD was deemed unnecessary.  She was advised to continue on sucralfate and Protonix and follow-up with gastroenterology in the outpatient setting.  She received hemodialysis per nephrology.  Given patient's complicated history, multiple hospitalizations for volume overload and acute GI bleed, she is at high risk of readmission.  " "Discussed all labs and findings with the patient.  On the day of discharge she had significant provement in overall symptoms and will follow up with regularly scheduled hemodialysis and follow-up appointments.     Therefore, she is discharged in good and stable condition to home with close outpatient follow-up.    The patient met 2-midnight criteria for an inpatient stay at the time of discharge.    Discharge Date  3/30/2021    FOLLOW UP ITEMS POST DISCHARGE  Follow-up with urology/oncology  Follow-up with gastroenterology  Follow-up with PCP    DISCHARGE DIAGNOSES  Principal Problem:    Upper GI bleed POA: Yes  Active Problems:    Anemia associated with acute blood loss POA: Unknown    ESRD (end stage renal disease) on dialysis (HCC) POA: Yes    Pulmonary edema POA: Unknown    Chronic respiratory failure with hypoxia (HCC) POA: Unknown    Immunosuppression (HCC) POA: Unknown    Hemoptysis POA: Unknown    HTN (hypertension) POA: Unknown      Overview: \"Controlled with medication\"    Lupus (HCC) POA: Yes    Elevated troponin POA: Yes    Thrombocytopenia (HCC) POA: Yes    Elevated INR POA: Unknown  Resolved Problems:    Acute respiratory failure with hypoxemia (HCC) POA: Unknown      FOLLOW UP  Future Appointments   Date Time Provider Department Center   4/1/2021  8:00 AM [facility] INJECT ON[facility] [address]   4/3/2021  8:00 AM [facility] [address]   4/15/2021  9:00 AM INFUSION QUICK INJECT ONP [address]   4/17/2021  9:30 AM [facility] [address]   4/29/2021  9:30 AM [facility] [place]   5/1/2021  9:30 AM [facility] [place]     Ella Matthew M.D.  [address] [place] [id number]  [phone]    In 2 weeks  As needed, If symptoms worsen      MEDICATIONS ON DISCHARGE     Medication List      CHANGE how you take these medications      Instructions   furosemide 80 MG Tabs  What changed:   · medication strength  · how much to take  · when to take " this  Commonly known as: Lasix   Take 2 Tablets by mouth 2 Times a Day for 30 days.  Dose: 160 mg        CONTINUE taking these medications      Instructions   acetaminophen 500 MG Tabs  Commonly known as: TYLENOL   Take 500 mg by mouth every 6 hours as needed for Moderate Pain.  Dose: 500 mg     amLODIPine 10 MG Tabs  Commonly known as: NORVASC   Take 1 tablet by mouth every evening.  Dose: 10 mg     carvedilol 25 MG Tabs  Commonly known as: COREG   Take 1 tablet by mouth 2 times a day, with meals for 30 days.  Dose: 25 mg     hydroxychloroquine 200 MG Tabs  Commonly known as: Plaquenil   Take 200 mg by mouth every evening.  Dose: 200 mg     metoclopramide 10 MG Tabs  Commonly known as: REGLAN   Take 1 tablet by mouth 3 times a day before meals for 25 days.  Dose: 10 mg     mycophenolate 180 MG EC tablet  Commonly known as: Myfortic   Take 1 Tab by mouth every evening.  Dose: 180 mg     ondansetron 4 MG Tbdp  Commonly known as: ZOFRAN ODT   Take 1 tablet by mouth every four hours as needed for Nausea (give PO if no IV route available).  Dose: 4 mg     pantoprazole 40 MG Tbec  Commonly known as: PROTONIX   Take 1 tablet by mouth 2 times a day for 30 days.  Dose: 40 mg     predniSONE 5 MG Tabs  Commonly known as: DELTASONE   Take 5 mg by mouth every evening.  Dose: 5 mg     sucralfate 1 GM/10ML Susp  Commonly known as: CARAFATE   Doctor's comments: Can give tablets at same dosage if pt unable to afford liquid preparation, quantity sufficient for one month  Take 10 mL by mouth 4 Times a Day,Before Meals and at Bedtime.  Dose: 1 g            Allergies  Allergies   Allergen Reactions   • Cephalexin Rash     .   • Clindamycin Rash     .   • Methylprednisolone Unspecified     Anxious   • Metoprolol Rash     .       DIET  No orders of the defined types were placed in this encounter.      ACTIVITY  As tolerated.  Weight bearing as tolerated    CONSULTATIONS  Gastroenterology  Nephrology  Critical  care/pulmonology    PROCEDURES  Hemodialysis    LABORATORY  Lab Results   Component Value Date    SODIUM 133 (L) 03/29/2021    POTASSIUM 4.4 03/29/2021    CHLORIDE 93 (L) 03/29/2021    CO2 30 03/29/2021    GLUCOSE 127 (H) 03/29/2021    BUN 34 (H) 03/29/2021    CREATININE 3.80 (H) 03/29/2021        Lab Results   Component Value Date    WBC 10.9 (H) 03/29/2021    HEMOGLOBIN 9.1 (L) 03/29/2021    HEMATOCRIT 30.1 (L) 03/29/2021    PLATELETCT 43 (LL) 03/29/2021        Total time of the discharge process exceeds 40 minutes.

## 2021-04-01 ENCOUNTER — OUTPATIENT INFUSION SERVICES (OUTPATIENT)
Dept: ONCOLOGY | Facility: MEDICAL CENTER | Age: 26
End: 2021-04-01
Attending: INTERNAL MEDICINE
Payer: COMMERCIAL

## 2021-04-01 VITALS
HEART RATE: 95 BPM | DIASTOLIC BLOOD PRESSURE: 91 MMHG | SYSTOLIC BLOOD PRESSURE: 134 MMHG | TEMPERATURE: 98.5 F | RESPIRATION RATE: 17 BRPM | OXYGEN SATURATION: 94 %

## 2021-04-01 DIAGNOSIS — D64.9 SYMPTOMATIC ANEMIA: ICD-10-CM

## 2021-04-01 LAB
ABO GROUP BLD: NORMAL
BARCODED ABORH UBTYP: 5100
BARCODED PRD CODE UBPRD: NORMAL
BARCODED UNIT NUM UBUNT: NORMAL
BASOPHILS # BLD AUTO: 0.8 % (ref 0–1.8)
BASOPHILS # BLD: 0.04 K/UL (ref 0–0.12)
BLD GP AB SCN SERPL QL: NORMAL
CENTROMERE IGG TITR SER IF: 1 AU/ML (ref 0–40)
COMPONENT R 8504R: NORMAL
ENA JO1 AB TITR SER: 0 AU/ML (ref 0–40)
ENA SCL70 IGG SER QL: 1 AU/ML (ref 0–40)
ENA SM IGG SER-ACNC: 300 AU/ML (ref 0–40)
ENA SS-B IGG SER IA-ACNC: 1 AU/ML (ref 0–40)
EOSINOPHIL # BLD AUTO: 0.02 K/UL (ref 0–0.51)
EOSINOPHIL NFR BLD: 0.4 % (ref 0–6.9)
ERYTHROCYTE [DISTWIDTH] IN BLOOD BY AUTOMATED COUNT: 61.2 FL (ref 35.9–50)
HCT VFR BLD AUTO: 27.7 % (ref 37–47)
HGB BLD-MCNC: 8.1 G/DL (ref 12–16)
IMM GRANULOCYTES # BLD AUTO: 0.02 K/UL (ref 0–0.11)
IMM GRANULOCYTES NFR BLD AUTO: 0.4 % (ref 0–0.9)
LYMPHOCYTES # BLD AUTO: 0.52 K/UL (ref 1–4.8)
LYMPHOCYTES NFR BLD: 11 % (ref 22–41)
MCH RBC QN AUTO: 26.6 PG (ref 27–33)
MCHC RBC AUTO-ENTMCNC: 29.2 G/DL (ref 33.6–35)
MCV RBC AUTO: 90.8 FL (ref 81.4–97.8)
MONOCYTES # BLD AUTO: 0.35 K/UL (ref 0–0.85)
MONOCYTES NFR BLD AUTO: 7.4 % (ref 0–13.4)
NEUTROPHILS # BLD AUTO: 3.78 K/UL (ref 2–7.15)
NEUTROPHILS NFR BLD: 80 % (ref 44–72)
NRBC # BLD AUTO: 0 K/UL
NRBC BLD-RTO: 0 /100 WBC
PLATELET # BLD AUTO: 58 K/UL (ref 164–446)
PRODUCT TYPE UPROD: NORMAL
RBC # BLD AUTO: 3.05 M/UL (ref 4.2–5.4)
RH BLD: NORMAL
RIBOSOMAL P AB SER-ACNC: 1 AU/ML (ref 0–40)
SSA52 R0ENA AB IGG Q0420: 40 AU/ML (ref 0–40)
SSA60 R0ENA AB IGG Q0419: 128 AU/ML (ref 0–40)
U1 SNRNP IGG SER QL: ABNORMAL
UNIT STATUS USTAT: NORMAL
WBC # BLD AUTO: 4.7 K/UL (ref 4.8–10.8)

## 2021-04-01 PROCEDURE — 85025 COMPLETE CBC W/AUTO DIFF WBC: CPT

## 2021-04-01 PROCEDURE — 36415 COLL VENOUS BLD VENIPUNCTURE: CPT

## 2021-04-01 PROCEDURE — 86900 BLOOD TYPING SEROLOGIC ABO: CPT

## 2021-04-01 PROCEDURE — 86901 BLOOD TYPING SEROLOGIC RH(D): CPT

## 2021-04-01 PROCEDURE — 86850 RBC ANTIBODY SCREEN: CPT

## 2021-04-01 PROCEDURE — 86922 COMPATIBILITY TEST ANTIGLOB: CPT

## 2021-04-01 PROCEDURE — 306780 HCHG STAT FOR TRANSFUSION PER CASE

## 2021-04-01 RX ORDER — DIPHENHYDRAMINE HYDROCHLORIDE 50 MG/ML
25 INJECTION INTRAMUSCULAR; INTRAVENOUS PRN
Status: CANCELLED | OUTPATIENT
Start: 2021-04-01

## 2021-04-01 RX ORDER — DIPHENHYDRAMINE HCL 25 MG
25 TABLET ORAL ONCE
Status: CANCELLED | OUTPATIENT
Start: 2021-04-01 | End: 2021-04-01

## 2021-04-01 RX ORDER — ACETAMINOPHEN 325 MG/1
650 TABLET ORAL ONCE
Status: CANCELLED | OUTPATIENT
Start: 2021-04-01

## 2021-04-01 RX ORDER — 0.9 % SODIUM CHLORIDE 0.9 %
VIAL (ML) INJECTION PRN
Status: CANCELLED | OUTPATIENT
Start: 2021-04-01

## 2021-04-01 RX ORDER — HEPARIN SODIUM (PORCINE) LOCK FLUSH IV SOLN 100 UNIT/ML 100 UNIT/ML
500 SOLUTION INTRAVENOUS PRN
Status: CANCELLED | OUTPATIENT
Start: 2021-04-01

## 2021-04-01 RX ORDER — 0.9 % SODIUM CHLORIDE 0.9 %
10 VIAL (ML) INJECTION PRN
Status: CANCELLED | OUTPATIENT
Start: 2021-04-01

## 2021-04-01 RX ORDER — SODIUM CHLORIDE 9 MG/ML
INJECTION, SOLUTION INTRAVENOUS CONTINUOUS
Status: CANCELLED | OUTPATIENT
Start: 2021-04-01

## 2021-04-01 RX ORDER — ACETAMINOPHEN 325 MG/1
650 TABLET ORAL PRN
Status: CANCELLED | OUTPATIENT
Start: 2021-04-01

## 2021-04-01 RX ORDER — 0.9 % SODIUM CHLORIDE 0.9 %
3 VIAL (ML) INJECTION PRN
Status: CANCELLED | OUTPATIENT
Start: 2021-04-01

## 2021-04-01 NOTE — PROGRESS NOTES
Hemoglobin is 8.1 and platelets 58,000 today.  Pt does not meet parameters for blood products.  Spoke to Dr. Garcia and informed him of lab results and recent hospital admission.  Per Dr. Garcia, ok to give 1 unit PRBC since pt has SOB.  Asked Dr. Garcia if pre-medication of oral Benadryl can be changed to IV Benadryl per pt request.  Dr. Garcia did not want to change Benadryl order at this time.  COD sent to blood bank.  Blood bank communication ordered.  Informed pt of plan of care via phone call.

## 2021-04-01 NOTE — PROGRESS NOTES
Pt arrives to [facility] for lab draw for possible blood products on Saturday 4/03/2021.  Pt reports SOB with exertion.  Pt denies dizziness, chest discomfort or new signs of bleeding today.  Labs drawn via 23g butterfly needle to L-AC.  Site wrapped with pressure dressing.  Confirmed next appt time with pt.  Pt dc home with her mother as transportation.

## 2021-04-03 ENCOUNTER — OUTPATIENT INFUSION SERVICES (OUTPATIENT)
Dept: ONCOLOGY | Facility: MEDICAL CENTER | Age: 26
End: 2021-04-03
Attending: INTERNAL MEDICINE
Payer: COMMERCIAL

## 2021-04-03 VITALS
DIASTOLIC BLOOD PRESSURE: 115 MMHG | OXYGEN SATURATION: 98 % | TEMPERATURE: 98 F | HEIGHT: 66 IN | WEIGHT: 119.49 LBS | RESPIRATION RATE: 16 BRPM | SYSTOLIC BLOOD PRESSURE: 165 MMHG | HEART RATE: 87 BPM | BODY MASS INDEX: 19.2 KG/M2

## 2021-04-03 DIAGNOSIS — D64.9 SYMPTOMATIC ANEMIA: ICD-10-CM

## 2021-04-03 PROCEDURE — P9016 RBC LEUKOCYTES REDUCED: HCPCS

## 2021-04-03 PROCEDURE — A9270 NON-COVERED ITEM OR SERVICE: HCPCS | Performed by: INTERNAL MEDICINE

## 2021-04-03 PROCEDURE — 700102 HCHG RX REV CODE 250 W/ 637 OVERRIDE(OP): Performed by: INTERNAL MEDICINE

## 2021-04-03 PROCEDURE — 700111 HCHG RX REV CODE 636 W/ 250 OVERRIDE (IP): Performed by: INTERNAL MEDICINE

## 2021-04-03 PROCEDURE — 96374 THER/PROPH/DIAG INJ IV PUSH: CPT

## 2021-04-03 PROCEDURE — 306780 HCHG STAT FOR TRANSFUSION PER CASE

## 2021-04-03 PROCEDURE — 36430 TRANSFUSION BLD/BLD COMPNT: CPT

## 2021-04-03 RX ORDER — 0.9 % SODIUM CHLORIDE 0.9 %
VIAL (ML) INJECTION PRN
Status: CANCELLED | OUTPATIENT
Start: 2021-04-03

## 2021-04-03 RX ORDER — DIPHENHYDRAMINE HYDROCHLORIDE 50 MG/ML
25 INJECTION INTRAMUSCULAR; INTRAVENOUS PRN
Status: COMPLETED | OUTPATIENT
Start: 2021-04-03 | End: 2021-04-03

## 2021-04-03 RX ORDER — HEPARIN SODIUM (PORCINE) LOCK FLUSH IV SOLN 100 UNIT/ML 100 UNIT/ML
500 SOLUTION INTRAVENOUS PRN
Status: CANCELLED | OUTPATIENT
Start: 2021-04-03

## 2021-04-03 RX ORDER — 0.9 % SODIUM CHLORIDE 0.9 %
10 VIAL (ML) INJECTION PRN
Status: CANCELLED | OUTPATIENT
Start: 2021-04-03

## 2021-04-03 RX ORDER — ACETAMINOPHEN 325 MG/1
650 TABLET ORAL ONCE
Status: COMPLETED | OUTPATIENT
Start: 2021-04-03 | End: 2021-04-03

## 2021-04-03 RX ORDER — SODIUM CHLORIDE 9 MG/ML
INJECTION, SOLUTION INTRAVENOUS CONTINUOUS
Status: CANCELLED | OUTPATIENT
Start: 2021-04-03

## 2021-04-03 RX ORDER — 0.9 % SODIUM CHLORIDE 0.9 %
3 VIAL (ML) INJECTION PRN
Status: CANCELLED | OUTPATIENT
Start: 2021-04-03

## 2021-04-03 RX ORDER — ACETAMINOPHEN 325 MG/1
650 TABLET ORAL PRN
Status: CANCELLED | OUTPATIENT
Start: 2021-04-03

## 2021-04-03 RX ORDER — DIPHENHYDRAMINE HCL 25 MG
25 TABLET ORAL ONCE
Status: CANCELLED | OUTPATIENT
Start: 2021-04-03 | End: 2021-04-03

## 2021-04-03 RX ORDER — ACETAMINOPHEN 325 MG/1
650 TABLET ORAL ONCE
Status: CANCELLED | OUTPATIENT
Start: 2021-04-03

## 2021-04-03 RX ORDER — DIPHENHYDRAMINE HCL 25 MG
25 TABLET ORAL ONCE
Status: DISCONTINUED | OUTPATIENT
Start: 2021-04-03 | End: 2021-04-03 | Stop reason: HOSPADM

## 2021-04-03 RX ORDER — DIPHENHYDRAMINE HYDROCHLORIDE 50 MG/ML
25 INJECTION INTRAMUSCULAR; INTRAVENOUS PRN
Status: CANCELLED | OUTPATIENT
Start: 2021-04-03

## 2021-04-03 RX ADMIN — DIPHENHYDRAMINE HYDROCHLORIDE 25 MG: 50 INJECTION INTRAMUSCULAR; INTRAVENOUS at 13:04

## 2021-04-03 RX ADMIN — ACETAMINOPHEN 650 MG: 325 TABLET ORAL at 08:19

## 2021-04-03 ASSESSMENT — FIBROSIS 4 INDEX: FIB4 SCORE: 2.27

## 2021-04-03 NOTE — PROGRESS NOTES
Pt presented to infusion for 1 unit PRBC's. Labs done 4/1. PIV started, brisk blood return observed. Spoke with blood bank and they did not receive communication to order blood that RN sent on 4/1. Had to order blood today and would take several hours. Saline locked and wrapped pt's IV so she could leave and come back when blood arrived. Pt returned approx 1245. Pre-meds given, 1 unit PRBC's transfused without issue. Pt's BP elevated throughout visit, this is her baseline and she declines going to ER as she is asymptomatic and a dialysis pt with very labile BP's. She will take PM doses of BPs meds when she gets home. PIV flushed and removed, gauze drsg placed. Has next appt, left on foot to self care.

## 2021-04-04 LAB — STREP DNASE B TITR SER: <86 U/ML (ref 0–260)

## 2021-04-04 PROCEDURE — 86945 BLOOD PRODUCT/IRRADIATION: CPT

## 2021-04-06 ENCOUNTER — HOSPITAL ENCOUNTER (EMERGENCY)
Facility: MEDICAL CENTER | Age: 26
End: 2021-04-07
Attending: EMERGENCY MEDICINE
Payer: COMMERCIAL

## 2021-04-06 DIAGNOSIS — R10.13 EPIGASTRIC PAIN: ICD-10-CM

## 2021-04-06 PROCEDURE — 99284 EMERGENCY DEPT VISIT MOD MDM: CPT

## 2021-04-06 PROCEDURE — 86902 BLOOD TYPE ANTIGEN DONOR EA: CPT | Mod: 91

## 2021-04-06 ASSESSMENT — FIBROSIS 4 INDEX: FIB4 SCORE: 2.27

## 2021-04-07 ENCOUNTER — APPOINTMENT (OUTPATIENT)
Dept: RADIOLOGY | Facility: MEDICAL CENTER | Age: 26
End: 2021-04-07
Attending: EMERGENCY MEDICINE
Payer: COMMERCIAL

## 2021-04-07 ENCOUNTER — HOSPITAL ENCOUNTER (INPATIENT)
Facility: MEDICAL CENTER | Age: 26
LOS: 2 days | DRG: 377 | End: 2021-04-09
Attending: EMERGENCY MEDICINE | Admitting: INTERNAL MEDICINE
Payer: COMMERCIAL

## 2021-04-07 VITALS
DIASTOLIC BLOOD PRESSURE: 103 MMHG | WEIGHT: 125.88 LBS | HEART RATE: 89 BPM | BODY MASS INDEX: 20.23 KG/M2 | HEIGHT: 66 IN | RESPIRATION RATE: 18 BRPM | SYSTOLIC BLOOD PRESSURE: 156 MMHG | OXYGEN SATURATION: 100 % | TEMPERATURE: 98 F

## 2021-04-07 DIAGNOSIS — D61.818 PANCYTOPENIA (HCC): ICD-10-CM

## 2021-04-07 DIAGNOSIS — E87.20 LACTIC ACIDOSIS: ICD-10-CM

## 2021-04-07 DIAGNOSIS — R10.9 ACUTE ABDOMINAL PAIN: ICD-10-CM

## 2021-04-07 DIAGNOSIS — Z87.39 HISTORY OF LUPUS NEPHRITIS: ICD-10-CM

## 2021-04-07 DIAGNOSIS — K92.2 UPPER GASTROINTESTINAL BLEEDING: ICD-10-CM

## 2021-04-07 DIAGNOSIS — N18.6 ESRD (END STAGE RENAL DISEASE) (HCC): ICD-10-CM

## 2021-04-07 DIAGNOSIS — K92.0 HEMATEMESIS WITH NAUSEA: ICD-10-CM

## 2021-04-07 DIAGNOSIS — K21.00 GASTROESOPHAGEAL REFLUX DISEASE WITH ESOPHAGITIS, UNSPECIFIED WHETHER HEMORRHAGE: ICD-10-CM

## 2021-04-07 DIAGNOSIS — M32.9 SYSTEMIC LUPUS ERYTHEMATOSUS, UNSPECIFIED SLE TYPE, UNSPECIFIED ORGAN INVOLVEMENT STATUS (HCC): ICD-10-CM

## 2021-04-07 DIAGNOSIS — N18.6 END STAGE RENAL DISEASE (HCC): ICD-10-CM

## 2021-04-07 DIAGNOSIS — D69.6 THROMBOCYTOPENIA (HCC): ICD-10-CM

## 2021-04-07 DIAGNOSIS — R79.89 ELEVATED TROPONIN: ICD-10-CM

## 2021-04-07 DIAGNOSIS — D64.9 CHRONIC ANEMIA: ICD-10-CM

## 2021-04-07 PROBLEM — N18.5 CHRONIC KIDNEY DISEASE (CKD) STAGE G5/A1, GLOMERULAR FILTRATION RATE (GFR) LESS THAN OR EQUAL TO 15 ML/MIN/1.73 SQUARE METER AND ALBUMINURIA CREATININE RATIO LESS THAN 30 MG/G (HCC): Status: RESOLVED | Noted: 2017-08-02 | Resolved: 2021-04-07

## 2021-04-07 LAB
ABO GROUP BLD: NORMAL
ALBUMIN SERPL BCP-MCNC: 2.9 G/DL (ref 3.2–4.9)
ALBUMIN SERPL BCP-MCNC: 3 G/DL (ref 3.2–4.9)
ALBUMIN/GLOB SERPL: 0.6 G/DL
ALBUMIN/GLOB SERPL: 0.8 G/DL
ALP SERPL-CCNC: 56 U/L (ref 30–99)
ALP SERPL-CCNC: 59 U/L (ref 30–99)
ALT SERPL-CCNC: 7 U/L (ref 2–50)
ALT SERPL-CCNC: 7 U/L (ref 2–50)
ANION GAP SERPL CALC-SCNC: 11 MMOL/L (ref 7–16)
ANION GAP SERPL CALC-SCNC: 12 MMOL/L (ref 7–16)
APTT PPP: 30.9 SEC (ref 24.7–36)
AST SERPL-CCNC: 14 U/L (ref 12–45)
AST SERPL-CCNC: 18 U/L (ref 12–45)
BARCODED ABORH UBTYP: 5100
BARCODED ABORH UBTYP: 5100
BARCODED PRD CODE UBPRD: NORMAL
BARCODED PRD CODE UBPRD: NORMAL
BARCODED UNIT NUM UBUNT: NORMAL
BARCODED UNIT NUM UBUNT: NORMAL
BASOPHILS # BLD AUTO: 0.4 % (ref 0–1.8)
BASOPHILS # BLD AUTO: 0.7 % (ref 0–1.8)
BASOPHILS # BLD AUTO: 0.9 % (ref 0–1.8)
BASOPHILS # BLD: 0.02 K/UL (ref 0–0.12)
BASOPHILS # BLD: 0.03 K/UL (ref 0–0.12)
BASOPHILS # BLD: 0.04 K/UL (ref 0–0.12)
BILIRUB SERPL-MCNC: 0.6 MG/DL (ref 0.1–1.5)
BILIRUB SERPL-MCNC: 0.6 MG/DL (ref 0.1–1.5)
BLD GP AB SCN SERPL QL: NORMAL
BUN SERPL-MCNC: 42 MG/DL (ref 8–22)
BUN SERPL-MCNC: 46 MG/DL (ref 8–22)
CALCIUM SERPL-MCNC: 8.6 MG/DL (ref 8.4–10.2)
CALCIUM SERPL-MCNC: 9 MG/DL (ref 8.4–10.2)
CHLORIDE SERPL-SCNC: 88 MMOL/L (ref 96–112)
CHLORIDE SERPL-SCNC: 89 MMOL/L (ref 96–112)
CO2 SERPL-SCNC: 25 MMOL/L (ref 20–33)
CO2 SERPL-SCNC: 29 MMOL/L (ref 20–33)
COMMENT 1642: NORMAL
COMPONENT R 8504R: NORMAL
COMPONENT R 8504R: NORMAL
CREAT SERPL-MCNC: 5.84 MG/DL (ref 0.5–1.4)
CREAT SERPL-MCNC: 6.13 MG/DL (ref 0.5–1.4)
CRP SERPL HS-MCNC: 5.98 MG/DL (ref 0–0.75)
EKG IMPRESSION: NORMAL
EOSINOPHIL # BLD AUTO: 0.06 K/UL (ref 0–0.51)
EOSINOPHIL # BLD AUTO: 0.06 K/UL (ref 0–0.51)
EOSINOPHIL # BLD AUTO: 0.39 K/UL (ref 0–0.51)
EOSINOPHIL NFR BLD: 1.2 % (ref 0–6.9)
EOSINOPHIL NFR BLD: 1.9 % (ref 0–6.9)
EOSINOPHIL NFR BLD: 6.6 % (ref 0–6.9)
ERYTHROCYTE [DISTWIDTH] IN BLOOD BY AUTOMATED COUNT: 59.5 FL (ref 35.9–50)
ERYTHROCYTE [DISTWIDTH] IN BLOOD BY AUTOMATED COUNT: 61.5 FL (ref 35.9–50)
ERYTHROCYTE [DISTWIDTH] IN BLOOD BY AUTOMATED COUNT: 63.8 FL (ref 35.9–50)
ERYTHROCYTE [SEDIMENTATION RATE] IN BLOOD BY WESTERGREN METHOD: 100 MM/HOUR (ref 0–20)
GLOBULIN SER CALC-MCNC: 4 G/DL (ref 1.9–3.5)
GLOBULIN SER CALC-MCNC: 4.8 G/DL (ref 1.9–3.5)
GLUCOSE SERPL-MCNC: 73 MG/DL (ref 65–99)
GLUCOSE SERPL-MCNC: 95 MG/DL (ref 65–99)
HCG SERPL QL: NEGATIVE
HCG SERPL QL: NEGATIVE
HCT VFR BLD AUTO: 26.3 % (ref 37–47)
HCT VFR BLD AUTO: 28.3 % (ref 37–47)
HCT VFR BLD AUTO: 30.6 % (ref 37–47)
HGB BLD-MCNC: 7.9 G/DL (ref 12–16)
HGB BLD-MCNC: 8.8 G/DL (ref 12–16)
HGB BLD-MCNC: 8.8 G/DL (ref 12–16)
IMM GRANULOCYTES # BLD AUTO: 0.01 K/UL (ref 0–0.11)
IMM GRANULOCYTES # BLD AUTO: 0.01 K/UL (ref 0–0.11)
IMM GRANULOCYTES # BLD AUTO: 0.02 K/UL (ref 0–0.11)
IMM GRANULOCYTES NFR BLD AUTO: 0.2 % (ref 0–0.9)
IMM GRANULOCYTES NFR BLD AUTO: 0.3 % (ref 0–0.9)
IMM GRANULOCYTES NFR BLD AUTO: 0.3 % (ref 0–0.9)
INR PPP: 1.12 (ref 0.87–1.13)
LACTATE BLD-SCNC: 1.4 MMOL/L (ref 0.5–2)
LACTATE BLD-SCNC: 1.6 MMOL/L (ref 0.5–2)
LACTATE BLD-SCNC: 3.6 MMOL/L (ref 0.5–2)
LIPASE SERPL-CCNC: 20 U/L (ref 7–58)
LIPASE SERPL-CCNC: 42 U/L (ref 7–58)
LYMPHOCYTES # BLD AUTO: 0.28 K/UL (ref 1–4.8)
LYMPHOCYTES # BLD AUTO: 0.34 K/UL (ref 1–4.8)
LYMPHOCYTES # BLD AUTO: 0.43 K/UL (ref 1–4.8)
LYMPHOCYTES NFR BLD: 10.6 % (ref 22–41)
LYMPHOCYTES NFR BLD: 4.7 % (ref 22–41)
LYMPHOCYTES NFR BLD: 8.3 % (ref 22–41)
MAGNESIUM SERPL-MCNC: 1.6 MG/DL (ref 1.5–2.5)
MCH RBC QN AUTO: 26.7 PG (ref 27–33)
MCH RBC QN AUTO: 26.9 PG (ref 27–33)
MCH RBC QN AUTO: 27.4 PG (ref 27–33)
MCHC RBC AUTO-ENTMCNC: 28.8 G/DL (ref 33.6–35)
MCHC RBC AUTO-ENTMCNC: 30 G/DL (ref 33.6–35)
MCHC RBC AUTO-ENTMCNC: 31.1 G/DL (ref 33.6–35)
MCV RBC AUTO: 88.2 FL (ref 81.4–97.8)
MCV RBC AUTO: 89.5 FL (ref 81.4–97.8)
MCV RBC AUTO: 93 FL (ref 81.4–97.8)
MONOCYTES # BLD AUTO: 0.21 K/UL (ref 0–0.85)
MONOCYTES # BLD AUTO: 0.38 K/UL (ref 0–0.85)
MONOCYTES # BLD AUTO: 0.38 K/UL (ref 0–0.85)
MONOCYTES NFR BLD AUTO: 6.4 % (ref 0–13.4)
MONOCYTES NFR BLD AUTO: 6.5 % (ref 0–13.4)
MONOCYTES NFR BLD AUTO: 7.3 % (ref 0–13.4)
NEUTROPHILS # BLD AUTO: 2.56 K/UL (ref 2–7.15)
NEUTROPHILS # BLD AUTO: 4.29 K/UL (ref 2–7.15)
NEUTROPHILS # BLD AUTO: 4.8 K/UL (ref 2–7.15)
NEUTROPHILS NFR BLD: 79.8 % (ref 44–72)
NEUTROPHILS NFR BLD: 81.3 % (ref 44–72)
NEUTROPHILS NFR BLD: 82.6 % (ref 44–72)
NRBC # BLD AUTO: 0 K/UL
NRBC BLD-RTO: 0 /100 WBC
PHOSPHATE SERPL-MCNC: 4.5 MG/DL (ref 2.5–4.5)
PLATELET # BLD AUTO: 27 K/UL (ref 164–446)
PLATELET # BLD AUTO: 35 K/UL (ref 164–446)
PLATELET # BLD AUTO: 37 K/UL (ref 164–446)
PLATELETS.RETICULATED NFR BLD AUTO: 8.2 K/UL (ref 0.6–13.1)
POTASSIUM SERPL-SCNC: 4.6 MMOL/L (ref 3.6–5.5)
POTASSIUM SERPL-SCNC: 5 MMOL/L (ref 3.6–5.5)
PRODUCT TYPE UPROD: NORMAL
PRODUCT TYPE UPROD: NORMAL
PROT SERPL-MCNC: 7 G/DL (ref 6–8.2)
PROT SERPL-MCNC: 7.7 G/DL (ref 6–8.2)
PROTHROMBIN TIME: 14.1 SEC (ref 12–14.6)
RBC # BLD AUTO: 2.94 M/UL (ref 4.2–5.4)
RBC # BLD AUTO: 3.21 M/UL (ref 4.2–5.4)
RBC # BLD AUTO: 3.29 M/UL (ref 4.2–5.4)
RH BLD: NORMAL
SARS-COV+SARS-COV-2 AG RESP QL IA.RAPID: NOTDETECTED
SARS-COV-2 RNA RESP QL NAA+PROBE: NOTDETECTED
SODIUM SERPL-SCNC: 126 MMOL/L (ref 135–145)
SODIUM SERPL-SCNC: 128 MMOL/L (ref 135–145)
SPECIMEN SOURCE: NORMAL
SPECIMEN SOURCE: NORMAL
TROPONIN T SERPL-MCNC: 933 NG/L (ref 6–19)
UNIT STATUS USTAT: NORMAL
UNIT STATUS USTAT: NORMAL
WBC # BLD AUTO: 3.2 K/UL (ref 4.8–10.8)
WBC # BLD AUTO: 5.2 K/UL (ref 4.8–10.8)
WBC # BLD AUTO: 5.9 K/UL (ref 4.8–10.8)

## 2021-04-07 PROCEDURE — 700102 HCHG RX REV CODE 250 W/ 637 OVERRIDE(OP): Performed by: INTERNAL MEDICINE

## 2021-04-07 PROCEDURE — 93005 ELECTROCARDIOGRAM TRACING: CPT | Performed by: EMERGENCY MEDICINE

## 2021-04-07 PROCEDURE — 83690 ASSAY OF LIPASE: CPT | Mod: 91

## 2021-04-07 PROCEDURE — 86901 BLOOD TYPING SEROLOGIC RH(D): CPT

## 2021-04-07 PROCEDURE — 71045 X-RAY EXAM CHEST 1 VIEW: CPT

## 2021-04-07 PROCEDURE — 99285 EMERGENCY DEPT VISIT HI MDM: CPT

## 2021-04-07 PROCEDURE — C9803 HOPD COVID-19 SPEC COLLECT: HCPCS | Performed by: EMERGENCY MEDICINE

## 2021-04-07 PROCEDURE — 770006 HCHG ROOM/CARE - MED/SURG/GYN SEMI*

## 2021-04-07 PROCEDURE — A9270 NON-COVERED ITEM OR SERVICE: HCPCS | Performed by: INTERNAL MEDICINE

## 2021-04-07 PROCEDURE — 84703 CHORIONIC GONADOTROPIN ASSAY: CPT

## 2021-04-07 PROCEDURE — 87426 SARSCOV CORONAVIRUS AG IA: CPT

## 2021-04-07 PROCEDURE — 80053 COMPREHEN METABOLIC PANEL: CPT | Mod: 91

## 2021-04-07 PROCEDURE — 84100 ASSAY OF PHOSPHORUS: CPT

## 2021-04-07 PROCEDURE — 90935 HEMODIALYSIS ONE EVALUATION: CPT

## 2021-04-07 PROCEDURE — 700111 HCHG RX REV CODE 636 W/ 250 OVERRIDE (IP): Performed by: INTERNAL MEDICINE

## 2021-04-07 PROCEDURE — 99253 IP/OBS CNSLTJ NEW/EST LOW 45: CPT | Performed by: INTERNAL MEDICINE

## 2021-04-07 PROCEDURE — 80053 COMPREHEN METABOLIC PANEL: CPT

## 2021-04-07 PROCEDURE — A9270 NON-COVERED ITEM OR SERVICE: HCPCS | Performed by: STUDENT IN AN ORGANIZED HEALTH CARE EDUCATION/TRAINING PROGRAM

## 2021-04-07 PROCEDURE — 85610 PROTHROMBIN TIME: CPT

## 2021-04-07 PROCEDURE — 86922 COMPATIBILITY TEST ANTIGLOB: CPT | Mod: 91

## 2021-04-07 PROCEDURE — 83605 ASSAY OF LACTIC ACID: CPT | Mod: 91

## 2021-04-07 PROCEDURE — 85025 COMPLETE CBC W/AUTO DIFF WBC: CPT

## 2021-04-07 PROCEDURE — 96374 THER/PROPH/DIAG INJ IV PUSH: CPT

## 2021-04-07 PROCEDURE — 86900 BLOOD TYPING SEROLOGIC ABO: CPT

## 2021-04-07 PROCEDURE — 86140 C-REACTIVE PROTEIN: CPT

## 2021-04-07 PROCEDURE — 84484 ASSAY OF TROPONIN QUANT: CPT

## 2021-04-07 PROCEDURE — 700102 HCHG RX REV CODE 250 W/ 637 OVERRIDE(OP): Performed by: STUDENT IN AN ORGANIZED HEALTH CARE EDUCATION/TRAINING PROGRAM

## 2021-04-07 PROCEDURE — 83690 ASSAY OF LIPASE: CPT

## 2021-04-07 PROCEDURE — 85652 RBC SED RATE AUTOMATED: CPT

## 2021-04-07 PROCEDURE — 86850 RBC ANTIBODY SCREEN: CPT

## 2021-04-07 PROCEDURE — 84703 CHORIONIC GONADOTROPIN ASSAY: CPT | Mod: 91

## 2021-04-07 PROCEDURE — 85730 THROMBOPLASTIN TIME PARTIAL: CPT

## 2021-04-07 PROCEDURE — 700111 HCHG RX REV CODE 636 W/ 250 OVERRIDE (IP): Performed by: EMERGENCY MEDICINE

## 2021-04-07 PROCEDURE — 85025 COMPLETE CBC W/AUTO DIFF WBC: CPT | Mod: 91

## 2021-04-07 PROCEDURE — 99223 1ST HOSP IP/OBS HIGH 75: CPT | Performed by: INTERNAL MEDICINE

## 2021-04-07 PROCEDURE — 85055 RETICULATED PLATELET ASSAY: CPT

## 2021-04-07 PROCEDURE — U0003 INFECTIOUS AGENT DETECTION BY NUCLEIC ACID (DNA OR RNA); SEVERE ACUTE RESPIRATORY SYNDROME CORONAVIRUS 2 (SARS-COV-2) (CORONAVIRUS DISEASE [COVID-19]), AMPLIFIED PROBE TECHNIQUE, MAKING USE OF HIGH THROUGHPUT TECHNOLOGIES AS DESCRIBED BY CMS-2020-01-R: HCPCS

## 2021-04-07 PROCEDURE — 96375 TX/PRO/DX INJ NEW DRUG ADDON: CPT

## 2021-04-07 PROCEDURE — 87040 BLOOD CULTURE FOR BACTERIA: CPT | Mod: 91

## 2021-04-07 PROCEDURE — U0005 INFEC AGEN DETEC AMPLI PROBE: HCPCS

## 2021-04-07 PROCEDURE — 83735 ASSAY OF MAGNESIUM: CPT

## 2021-04-07 RX ORDER — ONDANSETRON 2 MG/ML
4 INJECTION INTRAMUSCULAR; INTRAVENOUS 3 TIMES DAILY
Status: DISCONTINUED | OUTPATIENT
Start: 2021-04-07 | End: 2021-04-09 | Stop reason: HOSPADM

## 2021-04-07 RX ORDER — POLYETHYLENE GLYCOL 3350 17 G/17G
1 POWDER, FOR SOLUTION ORAL
Status: DISCONTINUED | OUTPATIENT
Start: 2021-04-07 | End: 2021-04-09 | Stop reason: HOSPADM

## 2021-04-07 RX ORDER — PROMETHAZINE HYDROCHLORIDE 25 MG/1
12.5-25 SUPPOSITORY RECTAL EVERY 4 HOURS PRN
Status: DISCONTINUED | OUTPATIENT
Start: 2021-04-07 | End: 2021-04-09 | Stop reason: HOSPADM

## 2021-04-07 RX ORDER — PANTOPRAZOLE SODIUM 40 MG/10ML
40 INJECTION, POWDER, LYOPHILIZED, FOR SOLUTION INTRAVENOUS 2 TIMES DAILY
Status: DISCONTINUED | OUTPATIENT
Start: 2021-04-07 | End: 2021-04-07

## 2021-04-07 RX ORDER — PANTOPRAZOLE SODIUM 40 MG/1
40 TABLET, DELAYED RELEASE ORAL 2 TIMES DAILY
Status: DISCONTINUED | OUTPATIENT
Start: 2021-04-07 | End: 2021-04-07

## 2021-04-07 RX ORDER — BISACODYL 10 MG
10 SUPPOSITORY, RECTAL RECTAL
Status: DISCONTINUED | OUTPATIENT
Start: 2021-04-07 | End: 2021-04-09 | Stop reason: HOSPADM

## 2021-04-07 RX ORDER — PROMETHAZINE HYDROCHLORIDE 25 MG/1
12.5-25 TABLET ORAL EVERY 4 HOURS PRN
Status: DISCONTINUED | OUTPATIENT
Start: 2021-04-07 | End: 2021-04-09 | Stop reason: HOSPADM

## 2021-04-07 RX ORDER — ACETAMINOPHEN 325 MG/1
650 TABLET ORAL EVERY 6 HOURS PRN
Status: DISCONTINUED | OUTPATIENT
Start: 2021-04-07 | End: 2021-04-09 | Stop reason: HOSPADM

## 2021-04-07 RX ORDER — MORPHINE SULFATE 4 MG/ML
4 INJECTION, SOLUTION INTRAMUSCULAR; INTRAVENOUS ONCE
Status: COMPLETED | OUTPATIENT
Start: 2021-04-07 | End: 2021-04-07

## 2021-04-07 RX ORDER — AMOXICILLIN 250 MG
2 CAPSULE ORAL 2 TIMES DAILY
Status: DISCONTINUED | OUTPATIENT
Start: 2021-04-07 | End: 2021-04-09 | Stop reason: HOSPADM

## 2021-04-07 RX ORDER — METOCLOPRAMIDE HYDROCHLORIDE 5 MG/ML
10 INJECTION INTRAMUSCULAR; INTRAVENOUS ONCE
Status: ACTIVE | OUTPATIENT
Start: 2021-04-07 | End: 2021-04-08

## 2021-04-07 RX ORDER — ONDANSETRON 4 MG/1
4 TABLET, ORALLY DISINTEGRATING ORAL ONCE
Status: DISCONTINUED | OUTPATIENT
Start: 2021-04-07 | End: 2021-04-07

## 2021-04-07 RX ORDER — PROCHLORPERAZINE EDISYLATE 5 MG/ML
5-10 INJECTION INTRAMUSCULAR; INTRAVENOUS EVERY 4 HOURS PRN
Status: DISCONTINUED | OUTPATIENT
Start: 2021-04-07 | End: 2021-04-09 | Stop reason: HOSPADM

## 2021-04-07 RX ORDER — PREDNISONE 50 MG/1
50 TABLET ORAL EVERY EVENING
Status: DISCONTINUED | OUTPATIENT
Start: 2021-04-07 | End: 2021-04-09 | Stop reason: HOSPADM

## 2021-04-07 RX ORDER — SODIUM CHLORIDE 9 MG/ML
INJECTION, SOLUTION INTRAVENOUS CONTINUOUS
Status: DISCONTINUED | OUTPATIENT
Start: 2021-04-07 | End: 2021-04-07

## 2021-04-07 RX ORDER — ONDANSETRON 2 MG/ML
4 INJECTION INTRAMUSCULAR; INTRAVENOUS EVERY 4 HOURS PRN
Status: DISCONTINUED | OUTPATIENT
Start: 2021-04-07 | End: 2021-04-07

## 2021-04-07 RX ORDER — HYDROXYCHLOROQUINE SULFATE 200 MG/1
200 TABLET, FILM COATED ORAL EVERY EVENING
Status: DISCONTINUED | OUTPATIENT
Start: 2021-04-07 | End: 2021-04-09 | Stop reason: HOSPADM

## 2021-04-07 RX ORDER — ONDANSETRON 2 MG/ML
4 INJECTION INTRAMUSCULAR; INTRAVENOUS ONCE
Status: COMPLETED | OUTPATIENT
Start: 2021-04-07 | End: 2021-04-07

## 2021-04-07 RX ORDER — LORAZEPAM 0.5 MG/1
0.5 TABLET ORAL NIGHTLY PRN
Status: DISCONTINUED | OUTPATIENT
Start: 2021-04-07 | End: 2021-04-09 | Stop reason: HOSPADM

## 2021-04-07 RX ORDER — DIPHENHYDRAMINE HYDROCHLORIDE 50 MG/ML
25 INJECTION INTRAMUSCULAR; INTRAVENOUS ONCE
Status: ACTIVE | OUTPATIENT
Start: 2021-04-07 | End: 2021-04-08

## 2021-04-07 RX ORDER — SODIUM CHLORIDE 1 G/1
1 TABLET ORAL 2 TIMES DAILY WITH MEALS
Status: DISCONTINUED | OUTPATIENT
Start: 2021-04-07 | End: 2021-04-07

## 2021-04-07 RX ORDER — ONDANSETRON 4 MG/1
4 TABLET, ORALLY DISINTEGRATING ORAL EVERY 4 HOURS PRN
Status: DISCONTINUED | OUTPATIENT
Start: 2021-04-07 | End: 2021-04-09 | Stop reason: HOSPADM

## 2021-04-07 RX ORDER — FERROUS GLUCONATE 324(38)MG
324 TABLET ORAL 2 TIMES DAILY WITH MEALS
Status: DISCONTINUED | OUTPATIENT
Start: 2021-04-07 | End: 2021-04-09 | Stop reason: HOSPADM

## 2021-04-07 RX ORDER — MYCOPHENOLIC ACID 180 MG/1
180 TABLET, DELAYED RELEASE ORAL EVERY EVENING
Status: DISCONTINUED | OUTPATIENT
Start: 2021-04-07 | End: 2021-04-09 | Stop reason: HOSPADM

## 2021-04-07 RX ORDER — SUCRALFATE ORAL 1 G/10ML
1 SUSPENSION ORAL
Status: DISCONTINUED | OUTPATIENT
Start: 2021-04-07 | End: 2021-04-09 | Stop reason: HOSPADM

## 2021-04-07 RX ORDER — OMEPRAZOLE 20 MG/1
40 CAPSULE, DELAYED RELEASE ORAL 2 TIMES DAILY
Status: DISCONTINUED | OUTPATIENT
Start: 2021-04-07 | End: 2021-04-09 | Stop reason: HOSPADM

## 2021-04-07 RX ORDER — POLYETHYLENE GLYCOL 3350 17 G/17G
1 POWDER, FOR SOLUTION ORAL DAILY
Status: DISCONTINUED | OUTPATIENT
Start: 2021-04-07 | End: 2021-04-09

## 2021-04-07 RX ORDER — PREDNISONE 10 MG/1
5 TABLET ORAL EVERY EVENING
Status: DISCONTINUED | OUTPATIENT
Start: 2021-04-07 | End: 2021-04-07

## 2021-04-07 RX ORDER — FAMOTIDINE 20 MG/1
20 TABLET, FILM COATED ORAL 2 TIMES DAILY
Qty: 20 TABLET | Refills: 0 | Status: ON HOLD | OUTPATIENT
Start: 2021-04-07 | End: 2021-04-09

## 2021-04-07 RX ADMIN — Medication 1 TABLET: at 14:52

## 2021-04-07 RX ADMIN — MORPHINE SULFATE 4 MG: 4 INJECTION INTRAVENOUS at 01:07

## 2021-04-07 RX ADMIN — LORAZEPAM 0.5 MG: 0.5 TABLET ORAL at 20:41

## 2021-04-07 RX ADMIN — FERROUS GLUCONATE TAB 324 MG (37.5 MG ELEMENTAL IRON) 324 MG: 324 (37.5 FE) TAB at 17:29

## 2021-04-07 RX ADMIN — HYDROXYCHLOROQUINE SULFATE 200 MG: 200 TABLET, FILM COATED ORAL at 17:29

## 2021-04-07 RX ADMIN — SUCRALFATE 1 G: 1 SUSPENSION ORAL at 17:29

## 2021-04-07 RX ADMIN — ACETAMINOPHEN 650 MG: 325 TABLET, FILM COATED ORAL at 14:53

## 2021-04-07 RX ADMIN — EPOETIN ALFA-EPBX 10000 UNITS: 10000 INJECTION, SOLUTION INTRAVENOUS; SUBCUTANEOUS at 15:45

## 2021-04-07 RX ADMIN — PREDNISONE 50 MG: 50 TABLET ORAL at 17:29

## 2021-04-07 RX ADMIN — ONDANSETRON 4 MG: 4 TABLET, ORALLY DISINTEGRATING ORAL at 17:27

## 2021-04-07 RX ADMIN — SUCRALFATE 1 G: 1 SUSPENSION ORAL at 14:52

## 2021-04-07 RX ADMIN — OMEPRAZOLE 40 MG: 20 CAPSULE, DELAYED RELEASE ORAL at 17:29

## 2021-04-07 RX ADMIN — SUCRALFATE 1 G: 1 SUSPENSION ORAL at 20:41

## 2021-04-07 RX ADMIN — ONDANSETRON 4 MG: 2 INJECTION INTRAMUSCULAR; INTRAVENOUS at 01:03

## 2021-04-07 ASSESSMENT — ENCOUNTER SYMPTOMS
BLOOD IN STOOL: 0
WEIGHT LOSS: 1
HEARTBURN: 1
RESPIRATORY NEGATIVE: 1
VOMITING: 1
NEUROLOGICAL NEGATIVE: 1
CONSTIPATION: 0
FALLS: 0
FEVER: 0
CHILLS: 0
ABDOMINAL PAIN: 1
NECK PAIN: 0
PSYCHIATRIC NEGATIVE: 1
NAUSEA: 1
CARDIOVASCULAR NEGATIVE: 1
DIARRHEA: 0
BACK PAIN: 0
EYES NEGATIVE: 1
MYALGIAS: 1

## 2021-04-07 ASSESSMENT — COGNITIVE AND FUNCTIONAL STATUS - GENERAL
MOBILITY SCORE: 24
DAILY ACTIVITIY SCORE: 24
SUGGESTED CMS G CODE MODIFIER MOBILITY: CH
SUGGESTED CMS G CODE MODIFIER DAILY ACTIVITY: CH

## 2021-04-07 ASSESSMENT — PAIN DESCRIPTION - PAIN TYPE
TYPE: CHRONIC PAIN
TYPE: CHRONIC PAIN

## 2021-04-07 ASSESSMENT — LIFESTYLE VARIABLES
ALCOHOL_USE: NO
HAVE YOU EVER FELT YOU SHOULD CUT DOWN ON YOUR DRINKING: NO
TOTAL SCORE: 0
AVERAGE NUMBER OF DAYS PER WEEK YOU HAVE A DRINK CONTAINING ALCOHOL: 0
TOTAL SCORE: 0
TOTAL SCORE: 0
HAVE PEOPLE ANNOYED YOU BY CRITICIZING YOUR DRINKING: NO
EVER HAD A DRINK FIRST THING IN THE MORNING TO STEADY YOUR NERVES TO GET RID OF A HANGOVER: NO
ON A TYPICAL DAY WHEN YOU DRINK ALCOHOL HOW MANY DRINKS DO YOU HAVE: 0
EVER FELT BAD OR GUILTY ABOUT YOUR DRINKING: NO
HOW MANY TIMES IN THE PAST YEAR HAVE YOU HAD 5 OR MORE DRINKS IN A DAY: 0
CONSUMPTION TOTAL: NEGATIVE

## 2021-04-07 NOTE — PROGRESS NOTES
At bedside to attempt US PIV placement. Pt agreeable to one more attempt. Pt stated feeling cold; is febrile. Dialysis in progress. Warm packs placed to arm prior to surveying for vessels. Upper and lower left arm surveyed by this RN for 15-20mins. Only vessel visualized was in very medial AC. Pt agreeable to access attempt. IV start attempted but unsuccessful. Difficulty with passing through vessel walls. On US, PIV tip appeared to be in center of vessel but no blood return obtained. Pt also c/o increased pain at site so catheter was removed. Unable to visualize basilic, cephalic, and brachial veins in upper arms. No vessels visualized in lower arm or are sclerotic and unable to compress. Pt with large amounts of scar tissue and bruising to left arm. Informed primary RN of unsuccessful attempt.

## 2021-04-07 NOTE — PROGRESS NOTES
Lab called with critical result of at  Platelets 37. Critical lab result read. Dr. Burrows notified of critical lab result at 1622 . Critical lab result read back by Dr. Burrows.

## 2021-04-07 NOTE — ED PROVIDER NOTES
ED Provider Note    CHIEF COMPLAINT  Chief Complaint   Patient presents with   • Abdominal Pain     pt was just d/c from this ER about 2 hours ago, abd pain has continued   • Blood in Vomit     about 1 hour PTA, 1 episode of bright red blood in emesis       HPI    Primary care provider: Ella Matthew M.D.  Means of arrival: POV/Walk-in  History obtained from: Patient and Mother  History limited by: Nothing    Lily Nicole is a 25 y.o. female who presents with persistent chronic abdominal pain and recurrent hematemesis.  The patient had upper abdominal pain last night at around midnight and was evaluated in this ER, my colleague saw the patient and she was screened with labs.  Labs were fairly stable patient feeling better so she was discharged with urgent outpatient follow-up.  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  She most recently had several hospitalizations for hematemesis thought to be due to Karen-Keyes tears and some complex gastric bleeding.  She has required transfusions in the past.  After discharge tonight, she got home but then became nauseous and had 2 episodes of carolann hematemesis at home so came in for emergent reevaluation.  No alleviating measures attempted at home.  No aggravating factors.  She is on pantoprazole and sucralfate, as well as prednisone and mycophenolate and hydroxychloroquine.  No falls or injuries or trauma.  She does complain of a burning and sharp epigastric abdominal pain that radiates up into her chest in the midline.  Pain is moderate in severity.  Constant since onset at around midnight.  Waxing and waning in severity.  She has not stooled today denies any lower GI bleeding.  No known close Covid contacts, no dyspnea or cough.  Last dialysis was 2 days ago.    REVIEW OF SYSTEMS  Constitutional: Negative for fever or chills.   HENT: Negative for  rhinorrhea or sore throat.    Respiratory: Negative for cough or shortness of breath.    Cardiovascular: Positive for chest pain no syncope.  Gastrointestinal: Positive for nausea, vomiting, and hematemesis with epigastric abdominal pain.  Genitourinary: Negative for dysuria or flank pain.   Musculoskeletal: Negative for back pain or joint pain.   Skin: Negative for itching or rash.   Neurological: Negative for sensory or motor changes.   See HPI for further details. All other systems are negative.     PAST MEDICAL HISTORY   has a past medical history of Anemia (01/17/2018), AVF (arteriovenous fistula) ([facility]), Dialysis patient ([facility]), ESRD (end stage renal disease) on dialysis ([facility]) (01/17/2018), Heart burn, Hypertension (01/17/2018), Indigestion, Lupus ([facility]), Migraines (01/17/2018), and Seizure ([facility]) (2013).    PAST FAMILY HISTORY  Family History   Problem Relation Age of Onset   • Diabetes Paternal Grandmother        SOCIAL HISTORY  Social History     Tobacco Use   • Smoking status: Never Smoker   • Smokeless tobacco: Never Used   Substance and Sexual Activity   • Alcohol use: No   • Drug use: No   • Sexual activity: Not on file       SURGICAL HISTORY   has a past surgical history that includes ronak by laparoscopy (4/5/2010); av fistula creation (Right); angioplasty (01/17/2018); other; other; gastroscopy-endo (12/9/2019); gastroscopy (N/A, 5/30/2020); gastroscopy-endo (9/18/2020); upper gi endoscopy,ctrl bleed (11/12/2020); upper gi endoscopy,biopsy (11/12/2020); gastroscopy-endo (11/12/2020); colonoscopy,diagnostic (1/8/2021); upper gi endoscopy,diagnosis (1/8/2021); upper gi endoscopy,ctrl bleed (1/8/2021); upper gi endoscopy,diagnosis (3/5/2021); upper gi endoscopy,diagnosis (N/A, 3/19/2021); and upper gi endoscopy,ctrl bleed (3/19/2021).    CURRENT MEDICATIONS  Home Medications     Reviewed by Shefali Ontiveros (Pharmacy Tech) on 04/07/21 at 0856  Med List Status: Complete   Medication Last Dose Status    amLODIPine (NORVASC) 10 MG Tab 4/6/2021 Active   famotidine (PEPCID) 20 MG Tab Not Taking Active   furosemide (LASIX) 80 MG Tab 4/6/2021 Active   hydroxychloroquine (PLAQUENIL) 200 MG Tab 4/6/2021 Active   mycophenolate (MYFORTIC) 180 MG EC tablet 4/6/2021 Active   ondansetron (ZOFRAN ODT) 4 MG TABLET DISPERSIBLE Not Taking Active   pantoprazole (PROTONIX) 40 MG Tablet Delayed Response 4/6/2021 Active   predniSONE (DELTASONE) 5 MG Tab 4/6/2021 Active   sucralfate (CARAFATE) 1 GM/10ML Suspension 4/6/2021 Active                ALLERGIES  Allergies   Allergen Reactions   • Cephalexin Rash     .   • Clindamycin Rash     .   • Methylprednisolone Unspecified     Anxious   • Metoprolol Rash     .   • Norco [Hydrocodone-Acetaminophen] Nausea       PHYSICAL EXAM  VITAL SIGNS: /87   Pulse (!) 103   Temp 37.2 °C (99 °F) (Oral)   Resp 16   LMP 03/31/2021 (Approximate)   SpO2 97%    Pulse ox interpretation: On room air, I interpret this pulse ox as normal.  Constitutional: Chronically ill appearing for age sitting up in mild distress.  HEENT: Normocephalic, atraumatic. Posterior pharynx clear, mucous membranes dry.  Eyes:  EOMI. Normal sclerae.  Pale conjunctivae.  Neck: Supple, nontender.  Chest/Pulmonary: Slightly diminished to ausculation bilaterally, no wheezes or rhonchi.  Cardiovascular: Regular rate and rhythm, 2 out of 6 systolic murmur.  Right upper extremity fistula has good thrill.  Abdomen: Soft, nontender; no rebound, guarding, or masses.  Back: No CVA or midline tenderness.   Musculoskeletal: No deformity or edema.  Neuro: Alert, no focal weakness or asymmetry.  Psych: Flat affect but cooperative.  Skin: Slightly pale appearing but skin is otherwise warm and dry.      DIAGNOSTIC STUDIES / PROCEDURES    LABS & EKG  Results for orders placed or performed during the hospital encounter of 04/07/21   CBC WITH DIFFERENTIAL   Result Value Ref Range    WBC 3.2 (L) 4.8 - 10.8 K/uL    RBC 3.29 (L) 4.20 - 5.40  M/uL    Hemoglobin 8.8 (L) 12.0 - 16.0 g/dL    Hematocrit 30.6 (L) 37.0 - 47.0 %    MCV 93.0 81.4 - 97.8 fL    MCH 26.7 (L) 27.0 - 33.0 pg    MCHC 28.8 (L) 33.6 - 35.0 g/dL    RDW 63.8 (H) 35.9 - 50.0 fL    Platelet Count 27 (LL) 164 - 446 K/uL    Neutrophils-Polys 79.80 (H) 44.00 - 72.00 %    Lymphocytes 10.60 (L) 22.00 - 41.00 %    Monocytes 6.50 0.00 - 13.40 %    Eosinophils 1.90 0.00 - 6.90 %    Basophils 0.90 0.00 - 1.80 %    Immature Granulocytes 0.30 0.00 - 0.90 %    Nucleated RBC 0.00 /100 WBC    Neutrophils (Absolute) 2.56 2.00 - 7.15 K/uL    Lymphs (Absolute) 0.34 (L) 1.00 - 4.80 K/uL    Monos (Absolute) 0.21 0.00 - 0.85 K/uL    Eos (Absolute) 0.06 0.00 - 0.51 K/uL    Baso (Absolute) 0.03 0.00 - 0.12 K/uL    Immature Granulocytes (abs) 0.01 0.00 - 0.11 K/uL    NRBC (Absolute) 0.00 K/uL   COMP METABOLIC PANEL   Result Value Ref Range    Sodium 126 (L) 135 - 145 mmol/L    Potassium 5.0 3.6 - 5.5 mmol/L    Chloride 89 (L) 96 - 112 mmol/L    Co2 25 20 - 33 mmol/L    Anion Gap 12.0 7.0 - 16.0    Glucose 73 65 - 99 mg/dL    Bun 46 (H) 8 - 22 mg/dL    Creatinine 6.13 (HH) 0.50 - 1.40 mg/dL    Calcium 9.0 8.4 - 10.2 mg/dL    AST(SGOT) 18 12 - 45 U/L    ALT(SGPT) 7 2 - 50 U/L    Alkaline Phosphatase 59 30 - 99 U/L    Total Bilirubin 0.6 0.1 - 1.5 mg/dL    Albumin 2.9 (L) 3.2 - 4.9 g/dL    Total Protein 7.7 6.0 - 8.2 g/dL    Globulin 4.8 (H) 1.9 - 3.5 g/dL    A-G Ratio 0.6 g/dL   LACTIC ACID   Result Value Ref Range    Lactic Acid 3.6 (H) 0.5 - 2.0 mmol/L   MAGNESIUM   Result Value Ref Range    Magnesium 1.6 1.5 - 2.5 mg/dL   TROPONIN   Result Value Ref Range    Troponin T 933 (H) 6 - 19 ng/L   PROTHROMBIN TIME   Result Value Ref Range    PT 14.1 12.0 - 14.6 sec    INR 1.12 0.87 - 1.13   PHOSPHORUS   Result Value Ref Range    Phosphorus 4.5 2.5 - 4.5 mg/dL   APTT   Result Value Ref Range    APTT 30.9 24.7 - 36.0 sec   BLOOD CULTURE    Specimen: Peripheral; Blood   Result Value Ref Range    Significant Indicator NEG      Source BLD     Site PERIPHERAL     Culture Result       No Growth  Note: Blood cultures are incubated for 5 days and  are monitored continuously.Positive blood cultures  are called to the RN and reported as soon as  they are identified.  Blood culture testing and Gram stain, if indicated, are  performed at [facility], [address] [place] [address].  Positive blood cultures are  sent to [facility], [address] [place] [address], for organism identification and  susceptibility testing.     LIPASE   Result Value Ref Range    Lipase 20 7 - 58 U/L   BETA-HCG QUALITATIVE SERUM   Result Value Ref Range    Beta-Hcg Qualitative Serum Negative Negative   COD - Adult (Type and Screen)   Result Value Ref Range    ABO Grouping Only O     Rh Grouping Only POS     Antibody Screen-Cod NEG     Component R       RI                  RedBloodCellsIRR    [id number]   selected     04/07/21   13:04      Product Type Red Blood Cells IRR LR     Dispense Status selected     Unit Number (Barcoded) [id number]     Product Code (Barcoded) E0705K43     Blood Type (Barcoded) 5100     Component R       RI                  RedBloodCellsIRR    [id number]   selected     04/07/21   13:04      Product Type Red Blood Cells IRR LR     Dispense Status selected     Unit Number (Barcoded) [id number]     Product Code (Barcoded) F9631S85     Blood Type (Barcoded) 5100    SARS-COV Antigen CLARY: Collect dry nasal swab AND NP swab in VTM   Result Value Ref Range    SARS-CoV-2 Source NP Swab     SARS-COV ANTIGEN CLARY NotDetected Not-Detected   SARS-CoV-2 PCR (24 hour In-House): Collect NP swab in VTM    Specimen: Nasopharyngeal; Respirate   Result Value Ref Range    SARS-CoV-2 Source NP Swab     SARS-CoV-2 by PCR NotDetected    ESTIMATED GFR   Result Value Ref Range    GFR If African American 10 (A) >60 mL/min/1.73 m 2    GFR If Non  8 (A) >60 mL/min/1.73 m 2   LACTIC ACID   Result  Value Ref Range    Lactic Acid 1.4 0.5 - 2.0 mmol/L   LACTIC ACID   Result Value Ref Range    Lactic Acid 1.6 0.5 - 2.0 mmol/L   CRP QUANTITIVE (NON-CARDIAC)   Result Value Ref Range    Stat C-Reactive Protein 5.98 (H) 0.00 - 0.75 mg/dL   Sed Rate   Result Value Ref Range    Sed Rate Westergren 100 (H) 0 - 20 mm/hour   CBC WITH DIFFERENTIAL   Result Value Ref Range    WBC 5.9 4.8 - 10.8 K/uL    RBC 3.21 (L) 4.20 - 5.40 M/uL    Hemoglobin 8.8 (L) 12.0 - 16.0 g/dL    Hematocrit 28.3 (L) 37.0 - 47.0 %    MCV 88.2 81.4 - 97.8 fL    MCH 27.4 27.0 - 33.0 pg    MCHC 31.1 (L) 33.6 - 35.0 g/dL    RDW 59.5 (H) 35.9 - 50.0 fL    Platelet Count 37 (LL) 164 - 446 K/uL    Neutrophils-Polys 81.30 (H) 44.00 - 72.00 %    Lymphocytes 4.70 (L) 22.00 - 41.00 %    Monocytes 6.40 0.00 - 13.40 %    Eosinophils 6.60 0.00 - 6.90 %    Basophils 0.70 0.00 - 1.80 %    Immature Granulocytes 0.30 0.00 - 0.90 %    Nucleated RBC 0.00 /100 WBC    Neutrophils (Absolute) 4.80 2.00 - 7.15 K/uL    Lymphs (Absolute) 0.28 (L) 1.00 - 4.80 K/uL    Monos (Absolute) 0.38 0.00 - 0.85 K/uL    Eos (Absolute) 0.39 0.00 - 0.51 K/uL    Baso (Absolute) 0.04 0.00 - 0.12 K/uL    Immature Granulocytes (abs) 0.02 0.00 - 0.11 K/uL    NRBC (Absolute) 0.00 K/uL   CBC WITH DIFFERENTIAL   Result Value Ref Range    WBC 3.1 (L) 4.8 - 10.8 K/uL    RBC 2.64 (L) 4.20 - 5.40 M/uL    Hemoglobin 7.0 (L) 12.0 - 16.0 g/dL    Hematocrit 23.7 (L) 37.0 - 47.0 %    MCV 89.8 81.4 - 97.8 fL    MCH 26.5 (L) 27.0 - 33.0 pg    MCHC 29.5 (L) 33.6 - 35.0 g/dL    RDW 61.3 (H) 35.9 - 50.0 fL    Platelet Count 27 (LL) 164 - 446 K/uL    Neutrophils-Polys 70.40 44.00 - 72.00 %    Lymphocytes 15.00 (L) 22.00 - 41.00 %    Monocytes 12.10 0.00 - 13.40 %    Eosinophils 1.30 0.00 - 6.90 %    Basophils 0.60 0.00 - 1.80 %    Immature Granulocytes 0.60 0.00 - 0.90 %    Nucleated RBC 0.00 /100 WBC    Neutrophils (Absolute) 2.21 2.00 - 7.15 K/uL    Lymphs (Absolute) 0.47 (L) 1.00 - 4.80 K/uL    Monos  (Absolute) 0.38 0.00 - 0.85 K/uL    Eos (Absolute) 0.04 0.00 - 0.51 K/uL    Baso (Absolute) 0.02 0.00 - 0.12 K/uL    Immature Granulocytes (abs) 0.02 0.00 - 0.11 K/uL    NRBC (Absolute) 0.00 K/uL    Hypochromia 1+     Macrocytosis 1+     Microcytosis 1+    Comp Metabolic Panel   Result Value Ref Range    Sodium 132 (L) 135 - 145 mmol/L    Potassium 4.2 3.6 - 5.5 mmol/L    Chloride 94 (L) 96 - 112 mmol/L    Co2 31 20 - 33 mmol/L    Anion Gap 7.0 7.0 - 16.0    Glucose 95 65 - 99 mg/dL    Bun 24 (H) 8 - 22 mg/dL    Creatinine 3.87 (H) 0.50 - 1.40 mg/dL    Calcium 8.6 8.4 - 10.2 mg/dL    AST(SGOT) 15 12 - 45 U/L    ALT(SGPT) 7 2 - 50 U/L    Alkaline Phosphatase 47 30 - 99 U/L    Total Bilirubin 0.7 0.1 - 1.5 mg/dL    Albumin 2.8 (L) 3.2 - 4.9 g/dL    Total Protein 7.0 6.0 - 8.2 g/dL    Globulin 4.2 (H) 1.9 - 3.5 g/dL    A-G Ratio 0.7 g/dL   MAGNESIUM   Result Value Ref Range    Magnesium 1.6 1.5 - 2.5 mg/dL   CRP QUANTITIVE (NON-CARDIAC)   Result Value Ref Range    Stat C-Reactive Protein 8.25 (H) 0.00 - 0.75 mg/dL   LACTIC ACID   Result Value Ref Range    Lactic Acid 1.3 0.5 - 2.0 mmol/L   ESTIMATED GFR   Result Value Ref Range    GFR If  17 (A) >60 mL/min/1.73 m 2    GFR If Non  14 (A) >60 mL/min/1.73 m 2   PLATELET ESTIMATE   Result Value Ref Range    Plt Estimation Marked Decrease    MORPHOLOGY   Result Value Ref Range    RBC Morphology Present     Polychromia 1+    IMMATURE PLT FRACTION   Result Value Ref Range    Imm. Plt Fraction 14.1 (H) 0.6 - 13.1 K/uL   DIFFERENTIAL COMMENT   Result Value Ref Range    Comments-Diff see below    CBC WITH DIFFERENTIAL   Result Value Ref Range    WBC 2.4 (LL) 4.8 - 10.8 K/uL    RBC 2.49 (L) 4.20 - 5.40 M/uL    Hemoglobin 6.7 (L) 12.0 - 16.0 g/dL    Hematocrit 22.7 (L) 37.0 - 47.0 %    MCV 91.2 81.4 - 97.8 fL    MCH 26.9 (L) 27.0 - 33.0 pg    MCHC 29.5 (L) 33.6 - 35.0 g/dL    RDW 61.8 (H) 35.9 - 50.0 fL    Platelet Count 26 (LL) 164 - 446 K/uL     Neutrophils-Polys 68.50 44.00 - 72.00 %    Lymphocytes 14.00 (L) 22.00 - 41.00 %    Monocytes 10.50 0.00 - 13.40 %    Eosinophils 5.70 0.00 - 6.90 %    Basophils 0.90 0.00 - 1.80 %    Immature Granulocytes 0.40 0.00 - 0.90 %    Nucleated RBC 0.00 /100 WBC    Neutrophils (Absolute) 1.56 (L) 2.00 - 7.15 K/uL    Lymphs (Absolute) 0.32 (L) 1.00 - 4.80 K/uL    Monos (Absolute) 0.24 0.00 - 0.85 K/uL    Eos (Absolute) 0.13 0.00 - 0.51 K/uL    Baso (Absolute) 0.02 0.00 - 0.12 K/uL    Immature Granulocytes (abs) 0.01 0.00 - 0.11 K/uL    NRBC (Absolute) 0.00 K/uL    Anisocytosis 1+     Macrocytosis 1+    PLATELET ESTIMATE   Result Value Ref Range    Plt Estimation Marked Decrease    MORPHOLOGY   Result Value Ref Range    RBC Morphology Present     Polychromia 1+     Basophilic Stippling Few    IMMATURE PLT FRACTION   Result Value Ref Range    Imm. Plt Fraction 12.8 0.6 - 13.1 K/uL   DIFFERENTIAL COMMENT   Result Value Ref Range    Comments-Diff see below          RADIOLOGY  NM-GASTRIC EMPTYING-TOUGAS METHOD    (Results Pending)       COURSE & MEDICAL DECISION MAKING    This is a 25 y.o. female who presents with acute on chronic abdominal pain and recurrent hematemesis.  Complex medical history secondary to lupus and ESRD.  Recently had upper GI bleed.    Differential Diagnosis includes but is not limited to:  Karen-Keyes tear, acute blood loss anemia, lupus, gastritis, electrolyte abnormality    ED Course:  Unfortunate 25-year-old female with complex medical history as above with 2 episodes of carolann hematemesis just prior to arrival.  Given complex recent medical course and heart rate greater than 90 I will rule out sepsis and any potential life-threatening bleed, Reglan and Benadryl for symptom relief.  I will keep the patient n.p.o. until a surgical process is ruled out.  Her abdominal exam is benign.    Many lab abnormalities which are important for the patient.  Chronic anemia slightly better than labs done earlier  today, still has thrombocytopenia.  Lactic acidosis is present.  Chronically elevated troponin, EKG stable.    Unfortunately patient very difficult stick.  Numerous attempts by RN staff and myself cannot get IV access. Plan admit, PICC team consulted, hospitalist will kindly admit. Patient's nephrologist consulted, will arrange HD later today.    Complicated case. Patient's GI team will evaluate to see if repeat scope needed. No active vomiting but risk of bleed high plan admit. Consider hospital to hospital transfer for higher level of rheum care.    Medications   metoclopramide (REGLAN) injection 10 mg (0 mg Intravenous Held 4/7/21 0745)   diphenhydrAMINE (BENADRYL) injection 25 mg (0 mg Intravenous Held 4/7/21 0745)   hydroxychloroquine (Plaquenil) tablet 200 mg (200 mg Oral Given 4/7/21 1729)   mycophenolate (MYFORTIC) TBEC 180 mg (180 mg Oral Missed 4/7/21 1800)   sucralfate (CARAFATE) 1 GM/10ML suspension 1 g (1 g Oral Given 4/8/21 0602)   senna-docusate (PERICOLACE or SENOKOT S) 8.6-50 MG per tablet 2 tablet (2 Tablets Oral Refused 4/8/21 0600)     And   polyethylene glycol/lytes (MIRALAX) PACKET 1 Packet (has no administration in time range)     And   magnesium hydroxide (MILK OF MAGNESIA) suspension 30 mL (has no administration in time range)     And   bisacodyl (DULCOLAX) suppository 10 mg (has no administration in time range)   acetaminophen (Tylenol) tablet 650 mg (650 mg Oral Given 4/8/21 0603)   ondansetron (ZOFRAN ODT) dispertab 4 mg (4 mg Oral Given 4/8/21 0601)   promethazine (PHENERGAN) tablet 12.5-25 mg (has no administration in time range)   promethazine (PHENERGAN) suppository 12.5-25 mg (has no administration in time range)   prochlorperazine (COMPAZINE) injection 5-10 mg (has no administration in time range)   predniSONE (DELTASONE) tablet 50 mg (50 mg Oral Given 4/7/21 1729)   ondansetron (ZOFRAN) syringe/vial injection 4 mg ( Intravenous Canceled Entry 4/8/21 0600)   oyster shell  calcium/vitamin D 250-125 MG-UNIT tablet 1 tablet (1 tablet Oral Given 4/8/21 0602)   ferrous gluconate (FERGON) tablet 324 mg (0 mg Oral Held 4/8/21 0730)   polyethylene glycol/lytes (MIRALAX) PACKET 1 Packet (1 Packet Oral Refused 4/8/21 0600)   epoetin (Retacrit) injection (Dialysis use only) 10,000 Units (10,000 Units Intravenous Given 4/7/21 1545)   omeprazole (PRILOSEC) capsule 40 mg (40 mg Oral Given 4/8/21 0602)   LORazepam (ATIVAN) tablet 0.5 mg (0.5 mg Oral Given 4/7/21 2041)       FINAL IMPRESSION  1. Hematemesis with nausea    2. Acute abdominal pain    3. ESRD (end stage renal disease) ([facility])    4. Gastroesophageal reflux disease with esophagitis, unspecified whether hemorrhage    5. Upper gastrointestinal bleeding    6. End stage renal disease ([facility])    7. Pancytopenia ([facility])    8. Systemic lupus erythematosus, unspecified SLE type, unspecified organ involvement status ([facility])    9. Lactic acidosis    10. History of lupus nephritis    11. Thrombocytopenia (H[facility])    12. Chronic anemia    13. Elevated troponin          -ADMIT-      Pertinent Labs & Imaging studies reviewed and verified by myself, as well as nursing notes and the patient's past medical, family, and social histories (See chart for details).    Portions of this record were made with voice recognition software.  Despite my review, spelling/grammar/context errors may still remain.  Interpretation of this chart should be taken in this context.    Electronically signed by Chad Mcgraw M.D. on 4/8/2021 at 8:48 AM.

## 2021-04-07 NOTE — ED NOTES
Noreen from Lab called with critical result of Platelet 27 at 0858. Critical lab result read back to Noreen.   Dr. Mcgraw notified of critical lab result at 0859.  Critical lab result read back by Dr. John.

## 2021-04-07 NOTE — DISCHARGE PLANNING
Outpatient Dialysis Note    Confirmed patient is active at:    [address] [address]    Schedule: Monday, Wednesday, Friday   Time: 06:00am    Patient is seen by Dr. Trent in HD clinic.    Spoke with Tamra at facility who confirmed.    Forwarded records for review.    Mary Han- Dialysis Coordinator # [phone]  [facility]

## 2021-04-07 NOTE — H&P
Hospital Medicine History & Physical Note    Date of Service  4/7/2021    Primary Care Physician  Ella Matthew M.D.    Consultants  GI and nephrology    Code Status  Full Code    Chief Complaint  Chief Complaint   Patient presents with   • Abdominal Pain     pt was just d/c from this ER about 2 hours ago, abd pain has continued   • Blood in Vomit     about 1 hour PTA, 1 episode of bright red blood in emesis       History of Presenting Illness    25-year-old female with history of lupus complicated with end-stage kidney disease on dialysis, hypertension and seizure presented 4/7 with hematemesis and abdominal pain.  Patient had to admission to the hospital in March 2021 due to anemia and upper GI bleeding, she had EGD last month and showed Karen-Keyes and gastritis, patient was treated with transfusion and PPI, however she came today with the same complaints with abdominal pain and blood with emesis, denied any fever or chills denied any chest pain or loss of consciousness, no black stool and no significant chest pain and no changes on her shortness of breath, also patient states she has pain on her fingers, wrist and knees, on admission labs showed pancytopenia with platelets 27, hemoglobin 8.8 and white blood cell 3.2, her sodium was 126 and lactic acid 3.6, GI and nephrologist was contacted by ED physician.       Review of Systems  Review of Systems   Constitutional: Positive for malaise/fatigue and weight loss. Negative for chills and fever.   HENT: Negative.    Eyes: Negative.    Respiratory: Negative.    Cardiovascular: Negative.    Gastrointestinal: Positive for abdominal pain, heartburn, nausea and vomiting. Negative for blood in stool, constipation, diarrhea and melena.   Genitourinary: Negative.    Musculoskeletal: Positive for joint pain and myalgias. Negative for back pain, falls and neck pain.   Skin: Negative.    Neurological: Negative.    Psychiatric/Behavioral: Negative.        Past Medical  History   has a past medical history of Anemia (01/17/2018), AVF (arteriovenous fistula) ([facility]), Dialysis patient ([facility]), ESRD (end stage renal disease) on dialysis ([facility]) (01/17/2018), Heart burn, Hypertension (01/17/2018), Indigestion, Lupus ([facility]), Migraines (01/17/2018), and Seizure ([facility]) (2013).    Surgical History   has a past surgical history that includes ronak by laparoscopy (4/5/2010); av fistula creation (Right); angioplasty (01/17/2018); other; other; gastroscopy-endo (12/9/2019); gastroscopy (N/A, 5/30/2020); gastroscopy-endo (9/18/2020); pr upper gi endoscopy,ctrl bleed (11/12/2020); pr upper gi endoscopy,biopsy (11/12/2020); gastroscopy-endo (11/12/2020); pr colonoscopy,diagnostic (1/8/2021); pr upper gi endoscopy,diagnosis (1/8/2021); pr upper gi endoscopy,ctrl bleed (1/8/2021); pr upper gi endoscopy,diagnosis (3/5/2021); pr upper gi endoscopy,diagnosis (N/A, 3/19/2021); and pr upper gi endoscopy,ctrl bleed (3/19/2021).     Family History  family history includes Diabetes in her paternal grandmother.     Social History   reports that she has never smoked. She has never used smokeless tobacco. She reports that she does not drink alcohol and does not use drugs.    Allergies  Allergies   Allergen Reactions   • Cephalexin Rash     .   • Clindamycin Rash     .   • Methylprednisolone Unspecified     Anxious   • Metoprolol Rash     .   • Norco [Hydrocodone-Acetaminophen] Nausea       Medications  Prior to Admission Medications   Prescriptions Last Dose Informant Patient Reported? Taking?   amLODIPine (NORVASC) 10 MG Tab 4/6/2021 at 2000 Patient No No   Sig: Take 1 tablet by mouth every evening.   famotidine (PEPCID) 20 MG Tab Not Taking at Unknown time Patient No No   Sig: Take 1 tablet by mouth 2 times a day.   Patient not taking: Reported on 4/7/2021   furosemide (LASIX) 80 MG Tab 4/6/2021 at 2000 Patient No No   Sig: Take 2 Tablets by mouth 2 Times a Day for 30 days.   hydroxychloroquine (PLAQUENIL) 200 MG  Tab 4/6/2021 at 2000 Patient Yes No   Sig: Take 200 mg by mouth every evening.   mycophenolate (MYFORTIC) 180 MG EC tablet 4/6/2021 at 2000 Patient No No   Sig: Take 1 Tab by mouth every evening.   ondansetron (ZOFRAN ODT) 4 MG TABLET DISPERSIBLE Not Taking at Unknown time Patient No No   Sig: Take 1 tablet by mouth every four hours as needed for Nausea (give PO if no IV route available).   Patient not taking: Reported on 4/7/2021   pantoprazole (PROTONIX) 40 MG Tablet Delayed Response 4/6/2021 at 2000 Patient No No   Sig: Take 1 tablet by mouth 2 times a day for 30 days.   predniSONE (DELTASONE) 5 MG Tab 4/6/2021 at 2000 Patient Yes No   Sig: Take 5 mg by mouth every evening.   sucralfate (CARAFATE) 1 GM/10ML Suspension 4/6/2021 at 2000 Patient No No   Sig: Take 10 mL by mouth 4 Times a Day,Before Meals and at Bedtime.      Facility-Administered Medications: None       Physical Exam  Temp:  [36.7 °C (98 °F)-36.9 °C (98.4 °F)] 36.9 °C (98.4 °F)  Pulse:  [] 108  Resp:  [18-19] 19  BP: (149-161)/(103-110) 161/106  SpO2:  [90 %-100 %] 90 %    Physical Exam  Constitutional:       Appearance: She is not ill-appearing.   HENT:      Head: Normocephalic and atraumatic.   Eyes:      General: No scleral icterus.  Cardiovascular:      Rate and Rhythm: Normal rate.      Heart sounds: No murmur.   Pulmonary:      Effort: Pulmonary effort is normal. No respiratory distress.      Breath sounds: No wheezing or rales.   Abdominal:      General: Abdomen is flat. Bowel sounds are normal. There is no distension.      Palpations: Abdomen is soft.      Tenderness: There is no abdominal tenderness. There is no guarding.   Musculoskeletal:         General: Swelling and tenderness present. No deformity.      Right lower leg: No edema.      Left lower leg: No edema.      Comments: Patient has swelling warmth and pain on her bilateral knees, wrist and elbows with fingers on the right hand.   Skin:     General: Skin is dry.       Coloration: Skin is not pale.      Findings: No erythema, lesion or rash.   Neurological:      General: No focal deficit present.      Mental Status: She is alert and oriented to person, place, and time. Mental status is at baseline.      Cranial Nerves: No cranial nerve deficit.      Sensory: No sensory deficit.      Motor: No weakness.   Psychiatric:         Mood and Affect: Mood normal.         Laboratory:  Recent Labs     04/07/21  0040 04/07/21  0839   WBC 5.2 3.2*   RBC 2.94* 3.29*   HEMOGLOBIN 7.9* 8.8*   HEMATOCRIT 26.3* 30.6*   MCV 89.5 93.0   MCH 26.9* 26.7*   MCHC 30.0* 28.8*   RDW 61.5* 63.8*   PLATELETCT 35* 27*     Recent Labs     04/07/21  0040 04/07/21  0839   SODIUM 128* 126*   POTASSIUM 4.6 5.0   CHLORIDE 88* 89*   CO2 29 25   GLUCOSE 95 73   BUN 42* 46*   CREATININE 5.84* 6.13*   CALCIUM 8.6 9.0     Recent Labs     04/07/21  0040 04/07/21  0839   ALTSGPT 7 7   ASTSGOT 14 18   ALKPHOSPHAT 56 59   TBILIRUBIN 0.6 0.6   LIPASE 42 20   GLUCOSE 95 73     Recent Labs     04/07/21  0839   APTT 30.9   INR 1.12     No results for input(s): NTPROBNP in the last 72 hours.      Recent Labs     04/07/21  0839   TROPONINT 933*       Imaging:  IR-PICC LINE PLACEMENT W/ GUIDANCE > AGE 5    (Results Pending)         Assessment/Plan:  I anticipate this patient will require at least two midnights for appropriate medical management, necessitating inpatient admission.    * Anemia associated with acute blood loss- (present on admission)  Assessment & Plan  Multifactorial including SLE, kidney failure and hematemesis  Hemoglobin around 8.8  Continue monitoring every 12 hours  PPI  Check iron panel  Transfusion after hemoglobin less than 7    Lactic acidosis  Assessment & Plan  Likely related to inflammation, vomiting and dehydration  Fluid IV gently and close monitoring for respiratory status  Follow labs    Pancytopenia (HCC)- (present on admission)  Assessment & Plan  Related to lupus flare  With significant  thrombocytopenia  Prednisone  40 mg daily antibiotic  Labs daily    Lupus (HCC)- (present on admission)  Assessment & Plan  Patient has exacerbation/flare  She has pain on her knees wrist and fingers with pancytopenia  Increase prednisone to 50 mg daily and continue hydroxychloroquine and mycophenolate    Hemorrhagic gastritis with hematemesis - (present on admission)  Assessment & Plan  Last EGD showed gastritis with Karen-Keyes  PPI and sucralfate  Close monitoring   GI was consulted    Hyponatremia- (present on admission)  Assessment & Plan  Likely related to end-stage kidney disease  Free water restriction  Labs daily    ESRD (end stage renal disease) on dialysis ([facility])- (present on admission)  Assessment & Plan  Due to SLE, dialysis Monday Wednesday and Friday.   Patient is on dialysis 3 times a week  Nephrologist was consulted, last dialysis was on Monday, she missed the dialysis today      Chronic respiratory failure with hypoxia ([facility])- (present on admission)  Assessment & Plan  Patient is on oxygen 2 L nasal cannula, on her baseline  No changes and no coughing.    Elevated troponin- (present on admission)  Assessment & Plan  Her troponin around 900, her  troponin last months was high also  Likely related to kidney failure  EKG no changes and no ischemic  Continue monitoring closely.  Patient is high risk for pericardial effusion, pericarditis, myocarditis or MI  Last echo on 3/29/2021 showed ejection fraction 45% with no valve diseases.        scds due to anemia and thrombocytopenia

## 2021-04-07 NOTE — ED TRIAGE NOTES
"Pt here with mother with c/o    Chief Complaint   Patient presents with   • Abdominal Pain     Upper abdominal pain, center. Onset 2300       /107   Pulse (!) 106   Temp 36.7 °C (98 °F) (Temporal)   Resp 18   Ht 1.676 m (5' 6\")   Wt 57.1 kg (125 lb 14.1 oz)   LMP 03/31/2021 (Approximate)   SpO2 100%   BMI 20.32 kg/m²   "

## 2021-04-07 NOTE — CARE PLAN
Problem: Communication  Goal: The ability to communicate needs accurately and effectively will improve  Outcome: PROGRESSING AS EXPECTED  Note: Pt verbalizes her needs as they arise. Will continue to foster this relationship      Problem: Safety  Goal: Will remain free from falls  Outcome: PROGRESSING AS EXPECTED  Note: Pt  verbalizes understanding of education provided. Pt encouraged to use call light when needing assistance.

## 2021-04-07 NOTE — ASSESSMENT & PLAN NOTE
Multifactorial including SLE, kidney failure and hematemesis  Hemoglobin around 8.8  Continue monitoring every 12 hours  PPI and sucralfate  Transfusion after hemoglobin less than 7

## 2021-04-07 NOTE — ASSESSMENT & PLAN NOTE
History of lupus with multiple admissions in the past for symptoms concerning for lupus flare.  She has pain on her knees wrist and fingers with pancytopenia      Increase prednisone to 50 mg daily and continue hydroxychloroquine and mycophenolate.  We will get in contact with outpatient rheumatologist for any further recommendations.   With her disease not well controlled, she may benefit from second opinion from a tertiary center.

## 2021-04-07 NOTE — ASSESSMENT & PLAN NOTE
Likely related to end-stage kidney disease  Nephrology following for hemodialysis needs.  Free water restriction  Labs daily

## 2021-04-07 NOTE — ASSESSMENT & PLAN NOTE
Due to SLE, dialysis Monday Wednesday and Friday.   Patient is on dialysis 3 times a week  Nephrologist was consulted, last dialysis was on Monday, she missed the dialysis today

## 2021-04-07 NOTE — ASSESSMENT & PLAN NOTE
Likely related to inflammation, vomiting and dehydration  Fluid IV gently and close monitoring for respiratory status

## 2021-04-07 NOTE — ED NOTES
Pts O2 sats appeared in the low 80s in triage while being on 4 L of personal O2. After assessment of the tank, it was empty so patient was not getting any supplemental O2. Pt placed on 4L of new O2 tank is now saturating at 100%

## 2021-04-07 NOTE — DISCHARGE PLANNING
ER CM met with pt and mother at bedside. Pleasant young woman. She lives with parents 1 story home in [place]. PCP Dr Matthew. RX [employer] on [address]. She goes to [facility] dialysis [facility] in [place]. She has ESRD and lupus. [facility] for blood. o2 at home she said [facility] [employer]. ER CM gave other DME names but they will check,.Etank with her no sticker on it. [facility] insurance. Tearful and stressed  Care Transition Team Assessment    Information Source  Orientation : Oriented x 4  Information Given By: Patient  Informant's Name: Lily   Who is responsible for making decisions for patient? : Patient         Elopement Risk  Legal Hold: No  Ambulatory or Self Mobile in Wheelchair: Yes  Disoriented: No  Psychiatric Symptoms: None  History of Wandering: No  Elopement this Admit: No  Vocalizing Wanting to Leave: No  Displays Behaviors, Body Language Wanting to Leave: No-Not at Risk for Elopement    Interdisciplinary Discharge Planning  Primary Care Physician: Dr Matthew  Lives with - Patient's Self Care Capacity: Parents  Patient or legal guardian wants to designate a caregiver: No  Support Systems: Parent  Housing / Facility: 1 Story House  Able to Return to Previous ADL's: Yes  Mobility Issues: No  Prior Services: Other (Comments)(dialysis)  Assistance Needed: No  Durable Medical Equipment: Home Oxygen  DME Provider / Phone: ?     Discharge Preparedness  What are your discharge supports?: Parent  Prior Functional Level: Ambulatory    Functional Assesment  Prior Functional Level: Ambulatory    Finances  Prescription Coverage: Yes([employer] [address])    Vision / Hearing Impairment  Vision Impairment : Yes  Right Eye Vision: Impaired, Wears Glasses  Left Eye Vision: Wears Glasses, Impaired  Hearing Impairment : No              Domestic Abuse  Have you ever been the victim of abuse or violence?: No  Physical Abuse or Sexual Abuse: No  Verbal Abuse or Emotional Abuse: No  Possible Abuse/Neglect Reported to:: Not Applicable               Anticipated Discharge Information  Discharge Disposition: Still a Patient (30)

## 2021-04-07 NOTE — ASSESSMENT & PLAN NOTE
Her troponin around 900, her  troponin last months was high also  Likely related to kidney failure  EKG no changes and no ischemic  Continue monitoring closely.  Patient is high risk for pericardial effusion, pericarditis, myocarditis or MI  Last echo on 3/29/2021 showed ejection fraction 45% with no valve diseases.

## 2021-04-07 NOTE — ED NOTES
Med rec updated and complete  Allergies reviewed  Interviewed pt with mother at bedside with permission from pt.  Pt reports no vitamins   Pt reports no antibiotics in the last 2 weeks

## 2021-04-07 NOTE — ED NOTES
Allegra from Lab called with critical result of Platelets 35 at 0122. Critical lab result read back to Allegra.   Dr. Fink notified of critical lab result at 0123.  Critical lab result read back by Dr. Fink.

## 2021-04-07 NOTE — ASSESSMENT & PLAN NOTE
Last EGD showed gastritis with Karen-Keyes  Continue with PPI and sucralfate  GI following.  Closely monitor blood count and transfuse as needed.

## 2021-04-07 NOTE — ED TRIAGE NOTES
Chief Complaint   Patient presents with   • Abdominal Pain     pt was just d/c from this ER about 2 hours ago, abd pain has continued   • Blood in Vomit     about 1 hour PTA, 1 episode of bright red blood in emesis     /110   Pulse 99   Temp 36.9 °C (98.4 °F) (Temporal)   Resp 19   LMP 03/31/2021 (Approximate)   SpO2 100%     Pt BIB mother for above concern.    COVID screen negative, mask in place

## 2021-04-07 NOTE — PROGRESS NOTES
[facility] Progress Note        HD today x 3 hours per Dr. Melinda Trent. Initiated at 1400 and ended at 1700.   Patient alert and oriented x 3.  Patient with 102 Temp. upon HD start.      Patient tolertaed procedure well. High Blood pressure during treatment     UF Net: 2400 mL  Patient tolerated tx. Awake alert post treatment.        See paper flowsheet for details.     AVF Acecess  - bleeding, +Thrill and Bruit. Applied dressing to sights.     Report Given to Bedside RN Anna Erwin RN.

## 2021-04-07 NOTE — ED PROVIDER NOTES
ED Provider Note    ER Provider Note         CHIEF COMPLAINT  Chief Complaint   Patient presents with   • Abdominal Pain     Upper abdominal pain, center. Onset 2300       HPI  Lily Nicole is a 25 y.o. female who presents to the Emergency Department with abdominal pain.  The patient says the epigastric.  It does not radiate.  She denies any nausea associated with this.  The patient says that she has been seeing a GI doctor for this.  She denies any bleeding at this time but does have a history of Karen-Keyes tears and GI bleeding.  She denies any dark or bloody stools.  She denies spitting up any blood.  She says she is been getting dialysis regularly.  She says his pain is similar but persistent and is hoping for answers today.    REVIEW OF SYSTEMS  See HPI for further details. All other systems are negative.     PAST MEDICAL HISTORY   has a past medical history of Anemia (01/17/2018), AVF (arteriovenous fistula) ([facility]), Dialysis patient ([facility]), ESRD (end stage renal disease) on dialysis ([facility]) (01/17/2018), Heart burn, Hypertension (01/17/2018), Indigestion, Lupus ([facility]), Migraines (01/17/2018), and Seizure ([facility]) (2013).    SURGICAL HISTORY   has a past surgical history that includes ronak by laparoscopy (4/5/2010); av fistula creation (Right); angioplasty (01/17/2018); other; other; gastroscopy-endo (12/9/2019); gastroscopy (N/A, 5/30/2020); gastroscopy-endo (9/18/2020); upper gi endoscopy,ctrl bleed (11/12/2020); upper gi endoscopy,biopsy (11/12/2020); gastroscopy-endo (11/12/2020); colonoscopy,diagnostic (1/8/2021); upper gi endoscopy,diagnosis (1/8/2021); upper gi endoscopy,ctrl bleed (1/8/2021); upper gi endoscopy,diagnosis (3/5/2021); upper gi endoscopy,diagnosis (N/A, 3/19/2021); and upper gi endoscopy,ctrl bleed (3/19/2021).    SOCIAL HISTORY  Social History     Tobacco Use   • Smoking status: Never Smoker   • Smokeless tobacco: Never Used   Substance Use Topics   • Alcohol use: No   • Drug use: No  "     Social History     Substance and Sexual Activity   Drug Use No       FAMILY HISTORY  Family History   Problem Relation Age of Onset   • Diabetes Paternal Grandmother        CURRENT MEDICATIONS  Home Medications    **Home medications have not yet been reviewed for this encounter**         ALLERGIES  Allergies   Allergen Reactions   • Cephalexin Rash     .   • Clindamycin Rash     .   • Methylprednisolone Unspecified     Anxious   • Metoprolol Rash     .   • Norco [Hydrocodone-Acetaminophen] Nausea       PHYSICAL EXAM  VITAL SIGNS: /103   Pulse 89   Temp 36.7 °C (98 °F) (Temporal)   Resp 18   Ht 1.676 m (5' 6\")   Wt 57.1 kg (125 lb 14.1 oz)   LMP 03/31/2021 (Approximate)   SpO2 100%   BMI 20.32 kg/m²      Constitutional: Alert in no apparent distress.  HENT: No signs of trauma, Bilateral external ears normal, Nose normal.   Eyes: Pupils are equal and reactive, Conjunctiva normal, Non-icteric.   Neck: Normal range of motion, No tenderness, Supple, No stridor.   Lymphatic: No lymphadenopathy noted.   Cardiovascular: Regular rate and rhythm, nondisplaced PMI  Thorax & Lungs: No respiratory distress,  No chest tenderness.   Abdomen: Bowel sounds normal, Soft, mild epigastric tenderness, No masses, No pulsatile masses. No peritoneal signs.  Skin: Warm, Dry, No erythema, No rash.   Back: No bony tenderness, No CVA tenderness.   Extremities: Intact distal pulses, No edema, No tenderness, No cyanosis.  Musculoskeletal: Good range of motion in all major joints. No tenderness to palpation or major deformities noted.   Neurologic: Alert , Normal motor function, Normal sensory function, No focal deficits noted.   Psychiatric: Affect normal, Judgment normal, Mood normal.     DIAGNOSTIC STUDIES / PROCEDURES    EKG Interpretation:  Interpreted by me  Sinus tachycardia rate of 100 with no ST elevation, depression or pathologic T waves.       LABS  Labs Reviewed   CBC WITH DIFFERENTIAL - Abnormal; Notable for the " following components:       Result Value    RBC 2.94 (*)     Hemoglobin 7.9 (*)     Hematocrit 26.3 (*)     MCH 26.9 (*)     MCHC 30.0 (*)     RDW 61.5 (*)     Platelet Count 35 (*)     Neutrophils-Polys 82.60 (*)     Lymphocytes 8.30 (*)     Lymphs (Absolute) 0.43 (*)     All other components within normal limits   COMP METABOLIC PANEL - Abnormal; Notable for the following components:    Sodium 128 (*)     Chloride 88 (*)     Bun 42 (*)     Creatinine 5.84 (*)     Albumin 3.0 (*)     Globulin 4.0 (*)     All other components within normal limits   ESTIMATED GFR - Abnormal; Notable for the following components:    GFR If  11 (*)     GFR If Non  9 (*)     All other components within normal limits   LIPASE   HCG QUAL SERUM   IMMATURE PLT FRACTION   DIFFERENTIAL COMMENT       All labs reviewed by me.    RADIOLOGY  DX-CHEST-PORTABLE (1 VIEW)   Final Result         1.  Pulmonary edema and/or infiltrates are identified, which are somewhat decreased since the prior exam.   2.  Cardiomegaly           The radiologist's interpretation of all radiological studies have been reviewed by me.    COURSE & MEDICAL DECISION MAKING  Pertinent Labs & Imaging studies reviewed. (See chart for details)    This is a 25 y.o. female that presents with epigastric abdominal pain.  Screening EKG was negative.  We will evaluate the patient for pancreatitis versus hepatitis versus pregnancy.  We will get a chest x-ray given the fact that the patient is having epigastric pain to assure there is no pneumonia or pneumothorax.  We will reassess after this..     12:31 AM - Patient seen and examined at bedside.          Patient has pulmonary edema however this appears to be baseline for her.  The patient has low platelets are this is baseline for her.  Lipase is negative.  We will she is not pregnant.  Patient has hyponatremia however this is baseline as well.  Her creatinine is elevated but this is baseline as well.   The patient has a chronically low hemoglobin and this is also at the patient's baseline.  She does appear to be around her baseline with all labs.  We will discharge her home with strict return precautions and follow-up.  The patient is going to [place] to obtain further testing and evaluation of her epigastric pain with a GI specialist there.    FINAL IMPRESSION  1. Epigastric pain              Electronically signed by: Rivera Fink M.D., 4/7/2021

## 2021-04-07 NOTE — PROGRESS NOTES
1330 Contacted Dr. Burrows via volate regarding multiple IV attempts in ER. Pt agreeable to let one more attempt be done with an ultrasound guided approach. MD would like us to try.    1400 Dialysis RN at bedside    Contacted PICC RNMarla to attempt    1440 PICC RN unsuccessful in placing ultrasound guided IV. Messaged Dr. Burrows via volate to inform him.

## 2021-04-07 NOTE — ED NOTES
Extensive attempts at PIV have been made, pt refusing anymore attempts. ERP and admitting hospitalist aware.

## 2021-04-07 NOTE — ED NOTES
Pt feeling better.  Pt given discharge instructions including to follow up with Dr. Matthew in 2 days; verbalized understanding of instructions. Out of ED in wheel chair accompanied by mother.

## 2021-04-07 NOTE — ASSESSMENT & PLAN NOTE
Related to lupus flare  With significant thrombocytopenia and anemia.  She gets intermittent transfusions outpatient.    Started on steroids to help with lupus flareup.  Closely monitor blood count and transfuse as needed.  Trend hemoglobin if less than 7.  With active bleeding, will transfuse platelet count to greater than 50,000.  IV fluids as needed.

## 2021-04-07 NOTE — ASSESSMENT & PLAN NOTE
Due to SLE, dialysis Monday Wednesday and Friday.   Patient is on dialysis 3 times a week  Nephrology been consulted.  Daily renal function.  Renally dose all medications.

## 2021-04-08 LAB
ALBUMIN SERPL BCP-MCNC: 2.8 G/DL (ref 3.2–4.9)
ALBUMIN/GLOB SERPL: 0.7 G/DL
ALP SERPL-CCNC: 47 U/L (ref 30–99)
ALT SERPL-CCNC: 7 U/L (ref 2–50)
ANION GAP SERPL CALC-SCNC: 7 MMOL/L (ref 7–16)
ANISOCYTOSIS BLD QL SMEAR: ABNORMAL
AST SERPL-CCNC: 15 U/L (ref 12–45)
BARCODED ABORH UBTYP: 5100
BARCODED PRD CODE UBPRD: NORMAL
BARCODED UNIT NUM UBUNT: NORMAL
BASO STIPL BLD QL SMEAR: NORMAL
BASOPHILS # BLD AUTO: 0.6 % (ref 0–1.8)
BASOPHILS # BLD AUTO: 0.9 % (ref 0–1.8)
BASOPHILS # BLD: 0.02 K/UL (ref 0–0.12)
BASOPHILS # BLD: 0.02 K/UL (ref 0–0.12)
BILIRUB SERPL-MCNC: 0.7 MG/DL (ref 0.1–1.5)
BUN SERPL-MCNC: 24 MG/DL (ref 8–22)
CALCIUM SERPL-MCNC: 8.6 MG/DL (ref 8.4–10.2)
CHLORIDE SERPL-SCNC: 94 MMOL/L (ref 96–112)
CO2 SERPL-SCNC: 31 MMOL/L (ref 20–33)
COMMENT 1642: NORMAL
COMMENT 1642: NORMAL
COMPONENT P 8504P: NORMAL
CREAT SERPL-MCNC: 3.87 MG/DL (ref 0.5–1.4)
CRP SERPL HS-MCNC: 8.25 MG/DL (ref 0–0.75)
EKG IMPRESSION: NORMAL
EOSINOPHIL # BLD AUTO: 0.04 K/UL (ref 0–0.51)
EOSINOPHIL # BLD AUTO: 0.13 K/UL (ref 0–0.51)
EOSINOPHIL NFR BLD: 1.3 % (ref 0–6.9)
EOSINOPHIL NFR BLD: 5.7 % (ref 0–6.9)
ERYTHROCYTE [DISTWIDTH] IN BLOOD BY AUTOMATED COUNT: 61.3 FL (ref 35.9–50)
ERYTHROCYTE [DISTWIDTH] IN BLOOD BY AUTOMATED COUNT: 61.8 FL (ref 35.9–50)
FERRITIN SERPL-MCNC: 778 NG/ML (ref 10–291)
GLOBULIN SER CALC-MCNC: 4.2 G/DL (ref 1.9–3.5)
GLUCOSE SERPL-MCNC: 95 MG/DL (ref 65–99)
HCT VFR BLD AUTO: 22.7 % (ref 37–47)
HCT VFR BLD AUTO: 23.7 % (ref 37–47)
HCT VFR BLD AUTO: 26.7 % (ref 37–47)
HCT VFR BLD AUTO: 29.6 % (ref 37–47)
HGB BLD-MCNC: 6.7 G/DL (ref 12–16)
HGB BLD-MCNC: 7 G/DL (ref 12–16)
HGB BLD-MCNC: 7.8 G/DL (ref 12–16)
HGB BLD-MCNC: 9 G/DL (ref 12–16)
HYPOCHROMIA BLD QL SMEAR: ABNORMAL
IMM GRANULOCYTES # BLD AUTO: 0.01 K/UL (ref 0–0.11)
IMM GRANULOCYTES # BLD AUTO: 0.02 K/UL (ref 0–0.11)
IMM GRANULOCYTES NFR BLD AUTO: 0.4 % (ref 0–0.9)
IMM GRANULOCYTES NFR BLD AUTO: 0.6 % (ref 0–0.9)
LACTATE BLD-SCNC: 1.3 MMOL/L (ref 0.5–2)
LYMPHOCYTES # BLD AUTO: 0.32 K/UL (ref 1–4.8)
LYMPHOCYTES # BLD AUTO: 0.47 K/UL (ref 1–4.8)
LYMPHOCYTES NFR BLD: 14 % (ref 22–41)
LYMPHOCYTES NFR BLD: 15 % (ref 22–41)
MACROCYTES BLD QL SMEAR: ABNORMAL
MACROCYTES BLD QL SMEAR: ABNORMAL
MAGNESIUM SERPL-MCNC: 1.6 MG/DL (ref 1.5–2.5)
MCH RBC QN AUTO: 26.5 PG (ref 27–33)
MCH RBC QN AUTO: 26.9 PG (ref 27–33)
MCHC RBC AUTO-ENTMCNC: 29.5 G/DL (ref 33.6–35)
MCHC RBC AUTO-ENTMCNC: 29.5 G/DL (ref 33.6–35)
MCV RBC AUTO: 89.8 FL (ref 81.4–97.8)
MCV RBC AUTO: 91.2 FL (ref 81.4–97.8)
MICROCYTES BLD QL SMEAR: ABNORMAL
MONOCYTES # BLD AUTO: 0.24 K/UL (ref 0–0.85)
MONOCYTES # BLD AUTO: 0.38 K/UL (ref 0–0.85)
MONOCYTES NFR BLD AUTO: 10.5 % (ref 0–13.4)
MONOCYTES NFR BLD AUTO: 12.1 % (ref 0–13.4)
NEUTROPHILS # BLD AUTO: 1.56 K/UL (ref 2–7.15)
NEUTROPHILS # BLD AUTO: 2.21 K/UL (ref 2–7.15)
NEUTROPHILS NFR BLD: 68.5 % (ref 44–72)
NEUTROPHILS NFR BLD: 70.4 % (ref 44–72)
NRBC # BLD AUTO: 0 K/UL
NRBC # BLD AUTO: 0 K/UL
NRBC BLD-RTO: 0 /100 WBC
NRBC BLD-RTO: 0 /100 WBC
PLATELET # BLD AUTO: 26 K/UL (ref 164–446)
PLATELET # BLD AUTO: 27 K/UL (ref 164–446)
PLATELET BLD QL SMEAR: NORMAL
PLATELET BLD QL SMEAR: NORMAL
PLATELETS.RETICULATED NFR BLD AUTO: 12.8 K/UL (ref 0.6–13.1)
PLATELETS.RETICULATED NFR BLD AUTO: 14.1 K/UL (ref 0.6–13.1)
POLYCHROMASIA BLD QL SMEAR: NORMAL
POLYCHROMASIA BLD QL SMEAR: NORMAL
POTASSIUM SERPL-SCNC: 4.2 MMOL/L (ref 3.6–5.5)
PRODUCT TYPE UPROD: NORMAL
PROT SERPL-MCNC: 7 G/DL (ref 6–8.2)
RBC # BLD AUTO: 2.49 M/UL (ref 4.2–5.4)
RBC # BLD AUTO: 2.64 M/UL (ref 4.2–5.4)
RBC BLD AUTO: PRESENT
RBC BLD AUTO: PRESENT
SODIUM SERPL-SCNC: 132 MMOL/L (ref 135–145)
UNIT STATUS USTAT: NORMAL
WBC # BLD AUTO: 2.4 K/UL (ref 4.8–10.8)
WBC # BLD AUTO: 3.1 K/UL (ref 4.8–10.8)

## 2021-04-08 PROCEDURE — 700111 HCHG RX REV CODE 636 W/ 250 OVERRIDE (IP): Performed by: INTERNAL MEDICINE

## 2021-04-08 PROCEDURE — 86140 C-REACTIVE PROTEIN: CPT

## 2021-04-08 PROCEDURE — 86945 BLOOD PRODUCT/IRRADIATION: CPT

## 2021-04-08 PROCEDURE — P9034 PLATELETS, PHERESIS: HCPCS

## 2021-04-08 PROCEDURE — 700111 HCHG RX REV CODE 636 W/ 250 OVERRIDE (IP): Performed by: STUDENT IN AN ORGANIZED HEALTH CARE EDUCATION/TRAINING PROGRAM

## 2021-04-08 PROCEDURE — 770006 HCHG ROOM/CARE - MED/SURG/GYN SEMI*

## 2021-04-08 PROCEDURE — 700111 HCHG RX REV CODE 636 W/ 250 OVERRIDE (IP): Performed by: HOSPITALIST

## 2021-04-08 PROCEDURE — 85055 RETICULATED PLATELET ASSAY: CPT

## 2021-04-08 PROCEDURE — 36430 TRANSFUSION BLD/BLD COMPNT: CPT

## 2021-04-08 PROCEDURE — 85018 HEMOGLOBIN: CPT

## 2021-04-08 PROCEDURE — 700102 HCHG RX REV CODE 250 W/ 637 OVERRIDE(OP): Performed by: INTERNAL MEDICINE

## 2021-04-08 PROCEDURE — 85025 COMPLETE CBC W/AUTO DIFF WBC: CPT

## 2021-04-08 PROCEDURE — 83550 IRON BINDING TEST: CPT

## 2021-04-08 PROCEDURE — 99232 SBSQ HOSP IP/OBS MODERATE 35: CPT | Performed by: INTERNAL MEDICINE

## 2021-04-08 PROCEDURE — P9016 RBC LEUKOCYTES REDUCED: HCPCS

## 2021-04-08 PROCEDURE — A9270 NON-COVERED ITEM OR SERVICE: HCPCS | Performed by: INTERNAL MEDICINE

## 2021-04-08 PROCEDURE — 30233N1 TRANSFUSION OF NONAUTOLOGOUS RED BLOOD CELLS INTO PERIPHERAL VEIN, PERCUTANEOUS APPROACH: ICD-10-PCS | Performed by: STUDENT IN AN ORGANIZED HEALTH CARE EDUCATION/TRAINING PROGRAM

## 2021-04-08 PROCEDURE — 85014 HEMATOCRIT: CPT

## 2021-04-08 PROCEDURE — 99233 SBSQ HOSP IP/OBS HIGH 50: CPT | Performed by: STUDENT IN AN ORGANIZED HEALTH CARE EDUCATION/TRAINING PROGRAM

## 2021-04-08 PROCEDURE — 83540 ASSAY OF IRON: CPT

## 2021-04-08 PROCEDURE — 83605 ASSAY OF LACTIC ACID: CPT

## 2021-04-08 PROCEDURE — 80053 COMPREHEN METABOLIC PANEL: CPT

## 2021-04-08 PROCEDURE — 83735 ASSAY OF MAGNESIUM: CPT

## 2021-04-08 PROCEDURE — 30233R1 TRANSFUSION OF NONAUTOLOGOUS PLATELETS INTO PERIPHERAL VEIN, PERCUTANEOUS APPROACH: ICD-10-PCS | Performed by: STUDENT IN AN ORGANIZED HEALTH CARE EDUCATION/TRAINING PROGRAM

## 2021-04-08 PROCEDURE — 82306 VITAMIN D 25 HYDROXY: CPT

## 2021-04-08 PROCEDURE — 82728 ASSAY OF FERRITIN: CPT

## 2021-04-08 RX ORDER — MORPHINE SULFATE 4 MG/ML
1 INJECTION, SOLUTION INTRAMUSCULAR; INTRAVENOUS EVERY 4 HOURS PRN
Status: DISCONTINUED | OUTPATIENT
Start: 2021-04-08 | End: 2021-04-09

## 2021-04-08 RX ORDER — SODIUM CHLORIDE 9 MG/ML
INJECTION, SOLUTION INTRAVENOUS CONTINUOUS
Status: ACTIVE | OUTPATIENT
Start: 2021-04-08 | End: 2021-04-09

## 2021-04-08 RX ORDER — SODIUM CHLORIDE 9 MG/ML
INJECTION, SOLUTION INTRAVENOUS CONTINUOUS
Status: ACTIVE | OUTPATIENT
Start: 2021-04-08 | End: 2021-04-08

## 2021-04-08 RX ORDER — DIPHENHYDRAMINE HYDROCHLORIDE 50 MG/ML
25 INJECTION INTRAMUSCULAR; INTRAVENOUS EVERY 6 HOURS PRN
Status: DISCONTINUED | OUTPATIENT
Start: 2021-04-08 | End: 2021-04-09 | Stop reason: HOSPADM

## 2021-04-08 RX ORDER — LORAZEPAM 2 MG/ML
0.5 INJECTION INTRAMUSCULAR ONCE
Status: COMPLETED | OUTPATIENT
Start: 2021-04-08 | End: 2021-04-08

## 2021-04-08 RX ORDER — CALCIUM CARBONATE 500 MG/1
500 TABLET, CHEWABLE ORAL DAILY
Status: DISCONTINUED | OUTPATIENT
Start: 2021-04-09 | End: 2021-04-09

## 2021-04-08 RX ORDER — HYDROCODONE BITARTRATE AND ACETAMINOPHEN 5; 325 MG/1; MG/1
1-2 TABLET ORAL EVERY 6 HOURS PRN
Status: DISCONTINUED | OUTPATIENT
Start: 2021-04-08 | End: 2021-04-08

## 2021-04-08 RX ADMIN — MORPHINE SULFATE 1 MG: 4 INJECTION INTRAVENOUS at 22:18

## 2021-04-08 RX ADMIN — ONDANSETRON 4 MG: 2 INJECTION INTRAMUSCULAR; INTRAVENOUS at 12:43

## 2021-04-08 RX ADMIN — PREDNISONE 50 MG: 50 TABLET ORAL at 17:56

## 2021-04-08 RX ADMIN — ACETAMINOPHEN 650 MG: 325 TABLET, FILM COATED ORAL at 06:03

## 2021-04-08 RX ADMIN — SUCRALFATE 1 G: 1 SUSPENSION ORAL at 12:22

## 2021-04-08 RX ADMIN — ONDANSETRON 4 MG: 2 INJECTION INTRAMUSCULAR; INTRAVENOUS at 17:56

## 2021-04-08 RX ADMIN — Medication 1 TABLET: at 06:02

## 2021-04-08 RX ADMIN — OMEPRAZOLE 40 MG: 20 CAPSULE, DELAYED RELEASE ORAL at 06:02

## 2021-04-08 RX ADMIN — MORPHINE SULFATE 1 MG: 4 INJECTION INTRAVENOUS at 17:58

## 2021-04-08 RX ADMIN — SUCRALFATE 1 G: 1 SUSPENSION ORAL at 17:56

## 2021-04-08 RX ADMIN — ACETAMINOPHEN 650 MG: 325 TABLET, FILM COATED ORAL at 13:26

## 2021-04-08 RX ADMIN — OMEPRAZOLE 40 MG: 20 CAPSULE, DELAYED RELEASE ORAL at 17:56

## 2021-04-08 RX ADMIN — SUCRALFATE 1 G: 1 SUSPENSION ORAL at 06:02

## 2021-04-08 RX ADMIN — ONDANSETRON 4 MG: 4 TABLET, ORALLY DISINTEGRATING ORAL at 06:01

## 2021-04-08 RX ADMIN — ONDANSETRON 4 MG: 4 TABLET, ORALLY DISINTEGRATING ORAL at 22:18

## 2021-04-08 RX ADMIN — FERROUS GLUCONATE TAB 324 MG (37.5 MG ELEMENTAL IRON) 324 MG: 324 (37.5 FE) TAB at 17:56

## 2021-04-08 RX ADMIN — HYDROXYCHLOROQUINE SULFATE 200 MG: 200 TABLET, FILM COATED ORAL at 17:56

## 2021-04-08 RX ADMIN — MORPHINE SULFATE 1 MG: 4 INJECTION INTRAVENOUS at 14:25

## 2021-04-08 RX ADMIN — DIPHENHYDRAMINE HYDROCHLORIDE 25 MG: 50 INJECTION, SOLUTION INTRAMUSCULAR; INTRAVENOUS at 13:26

## 2021-04-08 RX ADMIN — LORAZEPAM 0.5 MG: 2 INJECTION INTRAMUSCULAR; INTRAVENOUS at 23:30

## 2021-04-08 RX ADMIN — SUCRALFATE 1 G: 1 SUSPENSION ORAL at 20:57

## 2021-04-08 ASSESSMENT — ENCOUNTER SYMPTOMS
NAUSEA: 1
VOMITING: 1
EYES NEGATIVE: 1
SORE THROAT: 0
FEVER: 0
PALPITATIONS: 0
HEMOPTYSIS: 0
RESPIRATORY NEGATIVE: 1
WEIGHT LOSS: 1
MUSCULOSKELETAL NEGATIVE: 1
WHEEZING: 0
SINUS PAIN: 0
CHILLS: 0
SHORTNESS OF BREATH: 0
CARDIOVASCULAR NEGATIVE: 1
CONSTITUTIONAL NEGATIVE: 1
COUGH: 0
ORTHOPNEA: 0
ABDOMINAL PAIN: 1
BLOOD IN STOOL: 0

## 2021-04-08 ASSESSMENT — PAIN DESCRIPTION - PAIN TYPE
TYPE: CHRONIC PAIN;ACUTE PAIN

## 2021-04-08 NOTE — PROGRESS NOTES
Pt experiencing hematemesis in brown/bile green color. Pt complains of extreme nausea at this time. RN at [facility] recommended canceling the gastric emptying study d/t emesis. Dr. Patino updated.    PICC nurse at bedside to insert an IV.

## 2021-04-08 NOTE — CONSULTS
DATE OF SERVICE:  04/07/2021     NEPHROLOGY CONSULTATION     REQUESTING PHYSICIAN:  Chad Mcgraw MD     REASON FOR CONSULTATION:  To evaluate and provide dialysis for patient with   end-stage renal disease.     HISTORY OF PRESENT ILLNESS:  The patient is a 25-year-old female with   long-term history of systemic lupus erythematosus, lupus nephritis diagnosed   in 2013/2014.  At that time, the patient was treated in [facility]. On dialysis over the past 7 years. Since December, the patient   noticed worsening generalized health, more fatigue, weight loss, poor   appetite, recurrent admissions with GI bleeding. Recently had endoscopy, found   to have gastritis, esophagitis. Last night, admitted with abdominal pain,   nausea, and vomiting with presence of blood. Also required multiple blood   transfusions. Thinks rheumatologist, Dr. Wong, started treatment with   Benlysta, could not tolerate. Her dialysis schedule is Monday, Wednesday and   Friday, scheduled for dialysis today while admitted.     REVIEW OF SYSTEMS:    GENERAL:  Positive for fatigue, poor appetite, poor energy level.  No fever or   chills.  HEENT:  No sore throat, no nosebleeds, no sinus pain.  Eyes:  No double or   blurry vision.  RESPIRATORY:  No cough.  Mild shortness of breath on exertion.  No wheezes.  CARDIOVASCULAR:  No chest pain, no syncope, no palpitations.  Positive for   dyspnea on exertion.  GASTROINTESTINAL:  Positive abdominal pain, nausea, vomiting with hematemesis.  GENITOURINARY:  Not making urine.  No flank pain.     All other systems reviewed, all negative.     PAST MEDICAL HISTORY:  End-stage renal disease, lupus nephritis.  Dialysis   patient. Systemic lupus erythematosus, migraines, indigestion, GI bleeding,   anemia, hypertension, seizures.     PAST SURGICAL HISTORY:  Cholecystectomy, AV fistula creation, gastroscopy.     SOCIAL HISTORY:  No tobacco, alcohol or drug use.     ALLERGIES:  ALLERGIC TO  KEFLEX, CLINDAMYCIN, METHYLPREDNISONE, METOPROLOL,   NORCO.     PHYSICAL EXAMINATION:    VITAL SIGNS:  Blood pressure 149/110, pulse 99, temperature 36.9 Celsius.  GENERAL APPEARANCE:  Well-developed, thin female in no acute distress.  HEENT:  Normocephalic, atraumatic.  Pupils equal, round, reactive to light.    Extraocular movement intact.  Nares patent.  Oropharynx clear, moist mucosa,   no erythema or exudate.  NECK:  Supple, no lymphadenopathy, no thyromegaly appreciated.  LUNGS:  Clear to auscultation bilaterally.  No rales, wheezes or rhonchi.  HEART:  Regular rhythm, no rub or gallop.  ABDOMEN:  Soft, nontender, nondistended.  Bowel sounds present.  EXTREMITIES:  No cyanosis or clubbing, no edema.  NEUROLOGIC:  Alert, oriented x3, no focal deficits.  SKIN:  Warm, dry, no erythema or rash.     LABORATORY RESULTS:  Reviewed and revealed a white blood count 3.2, hemoglobin   8.8, platelets 27.  Sodium 126, potassium 5.0, BUN 46 and creatinine 6.13.     ASSESSMENT AND PLAN:  The patient is a 25-year-old female with long-term   history of lupus, end-stage renal disease due to lupus nephritis, on   hemodialysis for multiple years, recently with recurrent symptoms of lupus   flareup, anemia, GI bleeding, admitted with nausea, vomiting, abdominal pain.  1.  End-stage renal disease.  We will dialyze the patient today and continue   per her schedule, Monday, Wednesday and Friday.  2.  Electrolytes, noticed hyponatremia.  We will increase ultrafiltration with   hemodialysis as blood pressure tolerates.  Avoid hypotonic solutions.  3.  Anemia.  Epogen with hemodialysis.  4.  Volume. Ultrafiltration with hemodialysis as blood pressure tolerates.  5.  Hypertension.  Elevated blood pressure.  To continue losartan at 100 mg   once a day, amlodipine 10 mg once a day and carvedilol 25 mg twice a day.     RECOMMENDATIONS:  Hemodialysis, Monday, Wednesday, and Friday.  To monitor   hemoglobin level.  To monitor basic metabolic  panel.  Adjust medications to   renal doses.  Monitor blood pressure closely.  The patient needs hematology   evaluation.  We will follow the patient closely.     Thank you for the consult.        ______________________________  MD ROSANNA MOREIRA/TARAN    DD:  04/07/2021 14:35  DT:  04/07/2021 18:28    Job#:  [id number]

## 2021-04-08 NOTE — PROGRESS NOTES
Nephrology Daily Progress Note    Date of Service  4/8/2021    Chief Complaint  25 y.o. female with ESRD/HD due to LN, admitted 4/7/2021 with N./V/abdominal pain    Interval Problem Update  4/8 - received Plt, [facility]'s transfusions  HD MWF    Review of Systems  Review of Systems   Constitutional: Positive for malaise/fatigue and weight loss. Negative for chills and fever.   HENT: Negative for congestion, hearing loss, sinus pain and sore throat.    Eyes: Negative.    Respiratory: Negative for cough, hemoptysis, shortness of breath and wheezing.    Cardiovascular: Negative for chest pain, palpitations, orthopnea and leg swelling.   Gastrointestinal: Positive for abdominal pain, nausea and vomiting.   Genitourinary:        Not making urine   Skin: Negative.    All other systems reviewed and are negative.       Physical Exam  Temp:  [36.6 °C (97.9 °F)-37.6 °C (99.7 °F)] 37 °C (98.6 °F)  Pulse:  [103-127] 114  Resp:  [16-20] 20  BP: (120-147)/() 139/100  SpO2:  [87 %-100 %] 100 %    Physical Exam  Vitals and nursing note reviewed.   Constitutional:       General: She is not in acute distress.     Appearance: She is well-developed. She is ill-appearing. She is not diaphoretic.   HENT:      Head: Normocephalic and atraumatic.      Nose: Nose normal.      Mouth/Throat:      Mouth: Mucous membranes are moist.      Pharynx: Oropharynx is clear.   Eyes:      Extraocular Movements: Extraocular movements intact.      Conjunctiva/sclera: Conjunctivae normal.      Pupils: Pupils are equal, round, and reactive to light.   Cardiovascular:      Rate and Rhythm: Regular rhythm. Tachycardia present.   Pulmonary:      Effort: Pulmonary effort is normal.      Breath sounds: Normal breath sounds.   Abdominal:      General: Bowel sounds are normal. There is no distension.      Palpations: Abdomen is soft. There is no mass.      Tenderness: There is no abdominal tenderness.   Musculoskeletal:      Cervical back: Normal range of motion  and neck supple.      Right lower leg: No edema.      Left lower leg: No edema.   Skin:     General: Skin is warm and dry.      Coloration: Skin is pale.      Findings: No erythema or rash.   Neurological:      General: No focal deficit present.      Mental Status: She is alert and oriented to person, place, and time.      Cranial Nerves: No cranial nerve deficit.      Coordination: Coordination normal.   Psychiatric:         Mood and Affect: Mood normal.         Behavior: Behavior normal.         Thought Content: Thought content normal.         Judgment: Judgment normal.         Fluids    Intake/Output Summary (Last 24 hours) at 4/8/2021 1628  Last data filed at 4/7/2021 1700  Gross per 24 hour   Intake 600 ml   Output 3000 ml   Net -2400 ml       Laboratory  Recent Labs     04/07/21  1536 04/07/21  1536 04/08/21  0017 04/08/21  0655 04/08/21  1335   WBC 5.9  --  3.1* 2.4*  --    RBC 3.21*  --  2.64* 2.49*  --    HEMOGLOBIN 8.8*   < > 7.0* 6.7* 7.8*   HEMATOCRIT 28.3*   < > 23.7* 22.7* 26.7*   MCV 88.2  --  89.8 91.2  --    MCH 27.4  --  26.5* 26.9*  --    MCHC 31.1*  --  29.5* 29.5*  --    RDW 59.5*  --  61.3* 61.8*  --    PLATELETCT 37*  --  27* 26*  --     < > = values in this interval not displayed.     Recent Labs     04/07/21  0040 04/07/21  0839 04/08/21  0017   SODIUM 128* 126* 132*   POTASSIUM 4.6 5.0 4.2   CHLORIDE 88* 89* 94*   CO2 29 25 31   GLUCOSE 95 73 95   BUN 42* 46* 24*   CREATININE 5.84* 6.13* 3.87*   CALCIUM 8.6 9.0 8.6     Recent Labs     04/07/21  0839   APTT 30.9   INR 1.12     No results for input(s): NTPROBNP in the last 72 hours.            Assessment/Plan  1.ESRD/HD - on MWF schedule  2.HTN: elevated BP -to restart home meds  3.Electrolytes: hyponatremia better  4.Anemia: low Hb level/GIB     On Epogen on MWF  5.Lupus flare up -startedd on Banlysta -could not tolerate -f/u with Rheumatology    Recs:    Monitor CBC, BMP  Renal diet  All meds to renal doses  Rheumatology f/u  HD  MWF

## 2021-04-08 NOTE — CONSULTS
DATE OF SERVICE:  04/07/2021     GASTROENTEROLOGY CONSULTATION     REQUESTING PHYSICIAN:  Sophia Burrows MD     REASON FOR REQUEST:  Hematemesis.     HISTORY OF PRESENT ILLNESS:  The patient is a 25-year-old female with a long   history of very complicated systemic lupus erythematosus with end-stage renal   disease, on hemodialysis, also with chronic abdominal pain, history of gastric   and duodenal angioectasias and chronic GI bleeding from these.  She comes in   now after around midnight last night undergoing an episode of hematemesis.    She describes bright red blood in the emesis.  There was some other nausea and   vomiting prior to this.  She does complain of epigastric abdominal pain,   which is near constant for her perhaps, worse after emesis at this time.  She   denies any melena, no hematochezia.  In fact, she states her bowel movements   are normal although she tends to have constipated stools frequently.  She   denies any diarrhea.  No nausea or vomiting this morning.  The patient has had   numerous endoscopies over the years with two of these with admissions in the   last month, one of which thought there may also be a Karen-Keyes tear that   was treated and also her usual angioectasias in the stomach.  These were   treated as well.  Her hemoglobin actually is higher than it was on discharge   from her last admission today.  We are asked to see her to evaluate cause and   therapy for her hematemesis.     PAST MEDICAL HISTORY:  Includes systemic lupus erythematosus, end-stage renal   disease, chronic GI bleeding, chronic abdominal pain, seizures, migraines, and   GERD.     PAST SURGICAL HISTORY:  Includes laparoscopic cholecystectomy, AV fistula   creation, numerous gastroscopies.     MEDICATIONS:  Prior to admission include Norvasc, famotidine, furosemide,   Plaquenil, mycophenolate, Zofran, pantoprazole, Carafate.     FAMILY HISTORY:  Includes diabetes.     SOCIAL HISTORY:  She does not smoke.   She does not drink alcohol or use drugs.     ALLERGIES:  CEPHALEXIN, CLINDAMYCIN, METHYLPREDNISOLONE, METOPROLOL, NORCO.     PHYSICAL EXAMINATION:  VITAL SIGNS:  Blood pressure 161/106 with a heart rate of 108.  She is   afebrile, satting 91% on nasal cannula 4 liters, respiratory rate of 19.  GENERAL:  She is a pleasant white female in no acute distress.  HEENT:  Reveals that her extraocular movements are intact bilaterally.  Her   sclerae are anicteric bilaterally.  CARDIOVASCULAR:  Tachycardic without murmurs.  LUNGS:  Clear to auscultation in the anterior lung fields bilaterally.  ABDOMEN:  Soft.  There is really minimal tenderness to palpation, mostly in   the periumbilical area.  No rebound or guarding noted.  No organomegaly or   masses appreciated.  SKIN:  Reveals overall poor skin health with some evidence of pallor.  EXTREMITIES:  Evaluation reveals no pitting edema bilaterally.     LABORATORY DATA:  CBC reveals a white blood cell count 3.2, hemoglobin 8.8,   hematocrit 30.6, platelet count 27,000.  BMP with sodium 126, potassium 5.0,   chloride 89, bicarbonate 25, BUN 46, creatinine is 6.1 with glucose 73.  Liver   enzymes are normal.  INR is normal.     DIAGNOSTIC DATA:  Chest x-ray in the emergency room reveals evidence of some   pulmonary edema or infiltrates, decreased since the last exam and some   cardiomegaly.     IMPRESSION/PLAN/MEDICAL DECISION MAKIN.  Hematemesis.  At this point, suspect hematemesis either from her chronic   bleeding from her angioectasias or possibly from the previously noted   Karen-Keyes tear.  She does not have any melena at this point. She does not   have any ongoing nausea or vomiting as well.  I am not convinced there is any   significant bleeding going on other than what we suspect is a chronic bleeding   from the angioectasias.  She has been on PPIs and we certainly need to   continue those in-house.  I think we should also put her on iron orally   expecting  ongoing chronic bleeding in the future that perhaps this can reduce   the need for frequent transfusions and I have also started her on some MiraLax   while she takes the iron.  I discussed with her the potential indications of   doing the endoscopy versus holding on that and she would like to hold and I do   not disagree.  Her platelets are quite low.  We would likely need to give her   platelets prior to proceeding with the endoscopy given that we do expect   probably to do some treatments with argon plasma coagulator on her   angioectasias and would need platelet count higher than this in order to   hopefully achieve hemostasis.  At this point, I think this may represent more   of just her chronic bleeding.  In fact, her hemoglobin is actually higher than   it was on her previous lab draw and we elected to simply observe her   overnight for any evidence of ongoing bleeding.  I think she is volume   depleted and so I do suspect that her hemoglobin will go down some.  We can   revisit potential EGD tomorrow depending on what this all looks like and her   desires.  2.  Anemia. See discussion above.  3.  Gastric and duodenal arteriovenous malformations.  4.  End-stage renal disease.  5.  Systemic lupus erythematosus.  It might be prudent to reach out to her   rheumatologist and find out if she has been referred for a second opinion at a   tertiary medical center such as [facility] as the patient has frequent   admissions at her very young age if there is any way to better control her   disease.  6.  Thrombocytopenia.        ______________________________  MD MAR MCMULLEN/SAMEERA/DOMINIQUE    DD:  04/07/2021 13:05  DT:  04/07/2021 16:53    Job#:  [id number]    CC:Sophia Burrows MD

## 2021-04-08 NOTE — PROGRESS NOTES
Blood transfusion initiated at 1345. Blood verified with 2nd RN. Pt does not complain of fever/chills/SOB. Premedicated pt with tylenol and benadryl per pt request.    Blood transfusion finished at approximately 1645. VSS. Listed below for reference -  Temp = 97.8f  HR = 94  Resp = 20  /83    Pt denies symptoms from blood transfusion; pt denies SOB/chills.    Platelet transfusion initiated at 1800. Platelet transfusion verified with 2nd RN.

## 2021-04-08 NOTE — PROGRESS NOTES
Bedside report received from Anna MARTINEZ. Patient is in bed and stable. Patient's mother bedside. Bed locked and in lowest position, upper side rails up, call light in reach, whiteboard updated. POC discussed and pt verbalizes understanding.

## 2021-04-08 NOTE — CARE PLAN
Problem: Safety  Goal: Will remain free from injury  Outcome: PROGRESSING AS EXPECTED  Note: Pt utilizes call light appropriately for assistance; hourly rounding in place. Belongings within reach.     Problem: Knowledge Deficit  Goal: Knowledge of disease process/condition, treatment plan, diagnostic tests, and medications will improve  Outcome: PROGRESSING AS EXPECTED  Note: POC discussed regarding gastric emptying study today at 1300

## 2021-04-08 NOTE — CARE PLAN
Problem: Fluid Volume:  Goal: Will maintain balanced intake and output  Outcome: PROGRESSING AS EXPECTED  Intervention: Monitor, educate, and encourage compliance with therapeutic intake of liquids  Note: Patient educated and aware of need for appropriate intake. Patient receives dialysis MWF for ESRD and had 2.4L removed 4/7.     Problem: Pain Management  Goal: Pain level will decrease to patient's comfort goal  Outcome: PROGRESSING AS EXPECTED  Intervention: Educate and implement non-pharmacologic comfort measures. Examples: relaxation, distration, play therapy, activity therapy, massage, etc.  Flowsheets (Taken 4/7/2021 2000)  Intervention: [place]

## 2021-04-08 NOTE — PROGRESS NOTES
Hospital Medicine Daily Progress Note    Date of Service  4/8/2021    Chief Complaint  25 y.o. female admitted 4/7/2021 with hematemesis and significant pancytopenia.    Hospital Course  25 y.o. female admitted 4/7/2021 with hematemesis and significant pancytopenia. Symptoms are concerning for acute lupus flareup.  Patient initiated on steroids as well as home therapy- Myfortic and Plaquenil. GI nephrology closely following.    Interval Problem Update  Patient seen and examined at bedside this morning.  No acute events overnight.   Admitted overnight with complaints of hematemesis and abdominal pain.  Recently discharged about 3 weeks ago from the hospital with similar complaints, s/p EGD with findings of Karen-Keyes tear and gastritis.  Was discharged home on PPI and sucralfate.   Noted to be significantly pancytopenic on presentation with hemoglobin 6.7 and platelet count 27. GI has been reconsulted and nephrology was consulted for ESRD needs.  We will support for now with blood transfusion as needed, I think majority of her symptoms are likely secondary to uncontrolled lupus. She is currently getting Myfortic, Plaquenil and steroids for therapy. We will get in contact with her outpatient rheumatologist for any further recommendation.  She was unable to tolerate nuclear medicine gastric emptying study ordered for this morning.    Consultants/Specialty  GI  Nephrology    Code Status  Full Code    Disposition  home    Review of Systems  Review of Systems   Constitutional: Negative.    Eyes: Negative.    Respiratory: Negative.    Cardiovascular: Negative.    Gastrointestinal: Positive for abdominal pain, nausea and vomiting.   Musculoskeletal: Negative.    All other systems reviewed and are negative.       Physical Exam  Temp:  [36.6 °C (97.9 °F)-37.6 °C (99.7 °F)] 37 °C (98.6 °F)  Pulse:  [103-127] 114  Resp:  [16-20] 20  BP: (120-147)/() 139/100  SpO2:  [87 %-100 %] 100 %    Physical Exam  Constitutional:        General: She is not in acute distress.     Appearance: She is ill-appearing.   HENT:      Head: Normocephalic and atraumatic.      Nose: Nose normal.   Eyes:      Conjunctiva/sclera: Conjunctivae normal.   Cardiovascular:      Rate and Rhythm: Regular rhythm. Tachycardia present.   Pulmonary:      Effort: Pulmonary effort is normal.      Breath sounds: Normal breath sounds.   Abdominal:      General: Bowel sounds are normal.      Palpations: Abdomen is soft.   Musculoskeletal:         General: Normal range of motion.      Cervical back: Normal range of motion.      Comments: Right arm AVF noted with thrill   Skin:     General: Skin is dry.   Neurological:      General: No focal deficit present.      Mental Status: She is alert and oriented to person, place, and time.   Psychiatric:         Mood and Affect: Mood normal.         Behavior: Behavior normal.         Fluids    Intake/Output Summary (Last 24 hours) at 4/8/2021 1449  Last data filed at 4/7/2021 1700  Gross per 24 hour   Intake 600 ml   Output 3000 ml   Net -2400 ml       Laboratory  Recent Labs     04/07/21  1536 04/07/21  1536 04/08/21  0017 04/08/21  0655 04/08/21  1335   WBC 5.9  --  3.1* 2.4*  --    RBC 3.21*  --  2.64* 2.49*  --    HEMOGLOBIN 8.8*   < > 7.0* 6.7* 7.8*   HEMATOCRIT 28.3*   < > 23.7* 22.7* 26.7*   MCV 88.2  --  89.8 91.2  --    MCH 27.4  --  26.5* 26.9*  --    MCHC 31.1*  --  29.5* 29.5*  --    RDW 59.5*  --  61.3* 61.8*  --    PLATELETCT 37*  --  27* 26*  --     < > = values in this interval not displayed.     Recent Labs     04/07/21  0040 04/07/21  0839 04/08/21  0017   SODIUM 128* 126* 132*   POTASSIUM 4.6 5.0 4.2   CHLORIDE 88* 89* 94*   CO2 29 25 31   GLUCOSE 95 73 95   BUN 42* 46* 24*   CREATININE 5.84* 6.13* 3.87*   CALCIUM 8.6 9.0 8.6     Recent Labs     04/07/21  0839   APTT 30.9   INR 1.12               Imaging  No orders to display        Assessment/Plan  * Anemia associated with acute blood loss- (present on  admission)  Assessment & Plan  Multifactorial including SLE, kidney failure and hematemesis  Hemoglobin around 8.8  Continue monitoring every 12 hours  PPI and sucralfate  Transfusion after hemoglobin less than 7    Pancytopenia (HCC)- (present on admission)  Assessment & Plan  Related to lupus flare  With significant thrombocytopenia and anemia.  She gets intermittent transfusions outpatient.    Started on steroids to help with lupus flareup.  Closely monitor blood count and transfuse as needed.  Trend hemoglobin if less than 7.  With active bleeding, will transfuse platelet count to greater than 50,000.  IV fluids as needed.    Lupus flareup (HCC)- (present on admission)  Assessment & Plan  History of lupus with multiple admissions in the past for symptoms concerning for lupus flare.  She has pain on her knees wrist and fingers with pancytopenia      Increase prednisone to 50 mg daily and continue hydroxychloroquine and mycophenolate.  We will get in contact with outpatient rheumatologist for any further recommendations.   With her disease not well controlled, she may benefit from second opinion from a tertiary center.    Hemorrhagic gastritis with hematemesis - (present on admission)  Assessment & Plan  Last EGD showed gastritis with Karen-Keyes  Continue with PPI and sucralfate  GI following.  Closely monitor blood count and transfuse as needed.    Lactic acidosis  Assessment & Plan  Likely related to inflammation, vomiting and dehydration  Fluid IV gently and close monitoring for respiratory status    Hyponatremia- (present on admission)  Assessment & Plan  Likely related to end-stage kidney disease  Nephrology following for hemodialysis needs.  Free water restriction  Labs daily    ESRD (end stage renal disease) on dialysis (HCC)- (present on admission)  Assessment & Plan  Due to SLE, dialysis Monday Wednesday and Friday.   Patient is on dialysis 3 times a week  Nephrology been consulted.  Daily renal  function.  Renally dose all medications.    Chronic respiratory failure with hypoxia (HCC)- (present on admission)  Assessment & Plan  Patient is on oxygen 2 L nasal cannula, on her baseline  No changes and no coughing.    Elevated troponin- (present on admission)  Assessment & Plan  Her troponin around 900, her  troponin last months was high also  Likely related to kidney failure  EKG no changes and no ischemic  Continue monitoring closely.  Patient is high risk for pericardial effusion, pericarditis, myocarditis or MI  Last echo on 3/29/2021 showed ejection fraction 45% with no valve diseases.         VTE prophylaxis: scds

## 2021-04-08 NOTE — PROGRESS NOTES
Received call from lab regarding patient's critical result of platelets being 27. Hemoglobin of 7.0 also recognized. This nurse updated Dr. Madison regarding patient's labs. Per Dr. Madison this patient has a complicated case and labs will be redrawn again this morning before any transfusion takes place.

## 2021-04-08 NOTE — PROGRESS NOTES
"  Gastroenterology Progress Note     Author: Gadiel Whitney M.D.   Date & Time Created: 4/8/2021 12:41 PM    Chief Complaint:  Hematemesis  Interval History:  Has had additional episodes of nausea and emesis but no blood. Her BMs are \"normal and brown\"    Review of Systems:  Review of Systems   Constitutional: Positive for malaise/fatigue. Negative for chills and fever.   Gastrointestinal: Positive for nausea and vomiting. Negative for blood in stool and melena.       Physical Exam:  Physical Exam  Constitutional:       Appearance: She is ill-appearing.   HENT:      Head: Atraumatic.   Eyes:      General: No scleral icterus.     Extraocular Movements: Extraocular movements intact.   Cardiovascular:      Rate and Rhythm: Tachycardia present.      Heart sounds: No murmur.   Pulmonary:      Effort: Pulmonary effort is normal.      Breath sounds: Normal breath sounds.   Abdominal:      General: Abdomen is flat. There is no distension.      Palpations: Abdomen is soft.      Tenderness: There is abdominal tenderness. There is no guarding or rebound.   Skin:     General: Skin is warm and dry.   Neurological:      Mental Status: She is alert.   Psychiatric:         Mood and Affect: Mood normal.         Thought Content: Thought content normal.         Labs:          Recent Labs     04/07/21  0040 04/07/21  0839 04/08/21  0017   SODIUM 128* 126* 132*   POTASSIUM 4.6 5.0 4.2   CHLORIDE 88* 89* 94*   CO2 29 25 31   BUN 42* 46* 24*   CREATININE 5.84* 6.13* 3.87*   MAGNESIUM  --  1.6 1.6   PHOSPHORUS  --  4.5  --    CALCIUM 8.6 9.0 8.6     Recent Labs     04/07/21  0040 04/07/21  0839 04/08/21  0017   ALTSGPT 7 7 7   ASTSGOT 14 18 15   ALKPHOSPHAT 56 59 47   TBILIRUBIN 0.6 0.6 0.7   LIPASE 42 20  --    GLUCOSE 95 73 95     Recent Labs     04/07/21  0839 04/07/21  0839 04/07/21  1536 04/08/21  0017 04/08/21  0655   RBC 3.29*   < > 3.21* 2.64* 2.49*   HEMOGLOBIN 8.8*   < > 8.8* 7.0* 6.7*   HEMATOCRIT 30.6*   < > 28.3* 23.7* 22.7* "   PLATELETCT 27*   < > 37* 27* 26*   PROTHROMBTM 14.1  --   --   --   --    APTT 30.9  --   --   --   --    INR 1.12  --   --   --   --    FERRITIN  --   --   --  778.0*  --     < > = values in this interval not displayed.     Recent Labs     21  0040 21  0040 21  0839 21  0839 21  1536 21  0017 21  0655   WBC 5.2   < > 3.2*   < > 5.9 3.1* 2.4*   NEUTSPOLYS 82.60*   < > 79.80*   < > 81.30* 70.40 68.50   LYMPHOCYTES 8.30*   < > 10.60*   < > 4.70* 15.00* 14.00*   MONOCYTES 7.30   < > 6.50   < > 6.40 12.10 10.50   EOSINOPHILS 1.20   < > 1.90   < > 6.60 1.30 5.70   BASOPHILS 0.40   < > 0.90   < > 0.70 0.60 0.90   ASTSGOT 14  --  18  --   --  15  --    ALTSGPT 7  --  7  --   --  7  --    ALKPHOSPHAT 56  --  59  --   --  47  --    TBILIRUBIN 0.6  --  0.6  --   --  0.7  --     < > = values in this interval not displayed.     Hemodynamics:  Temp (24hrs), Av.6 °C (99.7 °F), Min:37.2 °C (99 °F), Max:38.4 °C (101.1 °F)  Temperature: 37.2 °C (99 °F)  Pulse  Av.2  Min: 73  Max: 132   Blood Pressure: 120/81     Respiratory:    Respiration: 18, Pulse Oximetry: (!) 87 %(Notified the RN)     Work Of Breathing / Effort: Mild  RUL Breath Sounds: Clear, RML Breath Sounds: Clear;Diminished, RLL Breath Sounds: Diminished, PALLAVI Breath Sounds: Clear, LLL Breath Sounds: Diminished  Fluids:    Intake/Output Summary (Last 24 hours) at 2021 1241  Last data filed at 2021 1700  Gross per 24 hour   Intake 600 ml   Output 3000 ml   Net -2400 ml        GI/Nutrition:  Orders Placed This Encounter   Procedures   • Diet Order Diet: Renal     Standing Status:   Standing     Number of Occurrences:   1     Order Specific Question:   Diet:     Answer:   Renal [8]     Medical Decision Making, by Problem:  Active Hospital Problems    Diagnosis    • *Anemia associated with acute blood loss [D62]    • Lactic acidosis [E87.2]    • Pancytopenia (HCC) [D61.818]    • Lupus (HCC) [M32.9]    • Hemorrhagic  gastritis with hematemesis  [K29.71]    • Hyponatremia [E87.1]    • ESRD (end stage renal disease) on dialysis (HCC) [N18.6, Z99.2]    • Chronic respiratory failure with hypoxia (HCC) [J96.11]    • Elevated troponin [R77.8]      Impression and Plan    1) Hematemesis - resolved. Suspect that this is from her known recurrent AVMs/Gastropath and/or nida antwan tear as have been noted on 2 EGDs in the last month. The primary underlying issue with this I suspect is poor control of her lupus however.  2) Anemia - probably multifactorial from some chronic blood loss, renal failure and her SLE.   3) Gastritis with hemorrhage - see above. No EGD planned at this point. Unlikely that this would change her Dx or if any treatment applied would chance the needs for her to get better control of the underlying causes (ESRD and SLE)   4) ESRD  5) SLE - agree with added steroids. Would consider discussing any recc.s from Dr Wong her Rheumatologist  6) Thrombocytopenia - secondary to SLE    EMO        Quality-Core Measures

## 2021-04-08 NOTE — PROGRESS NOTES
Lab called with critical result of WBC = 2.4 and platelet = 26 at 0755. Critical lab result read back to lab.   Dr. Patino notified of critical lab result at 0811.  Critical lab result read back by Dr. Patino.

## 2021-04-09 ENCOUNTER — PATIENT OUTREACH (OUTPATIENT)
Dept: HEALTH INFORMATION MANAGEMENT | Facility: OTHER | Age: 26
End: 2021-04-09

## 2021-04-09 VITALS
OXYGEN SATURATION: 100 % | WEIGHT: 134.92 LBS | DIASTOLIC BLOOD PRESSURE: 83 MMHG | HEART RATE: 138 BPM | RESPIRATION RATE: 20 BRPM | TEMPERATURE: 99.7 F | SYSTOLIC BLOOD PRESSURE: 137 MMHG | BODY MASS INDEX: 21.78 KG/M2

## 2021-04-09 LAB
25(OH)D3 SERPL-MCNC: 13 NG/ML (ref 30–80)
ANION GAP SERPL CALC-SCNC: 12 MMOL/L (ref 7–16)
BUN SERPL-MCNC: 38 MG/DL (ref 8–22)
CALCIUM SERPL-MCNC: 9 MG/DL (ref 8.4–10.2)
CHLORIDE SERPL-SCNC: 92 MMOL/L (ref 96–112)
CO2 SERPL-SCNC: 28 MMOL/L (ref 20–33)
CREAT SERPL-MCNC: 5.56 MG/DL (ref 0.5–1.4)
ERYTHROCYTE [DISTWIDTH] IN BLOOD BY AUTOMATED COUNT: 56.8 FL (ref 35.9–50)
GLUCOSE SERPL-MCNC: 82 MG/DL (ref 65–99)
HCT VFR BLD AUTO: 30 % (ref 37–47)
HCT VFR BLD AUTO: 30.9 % (ref 37–47)
HGB BLD-MCNC: 9.1 G/DL (ref 12–16)
HGB BLD-MCNC: 9.3 G/DL (ref 12–16)
IRON SATN MFR SERPL: 7 % (ref 15–55)
IRON SERPL-MCNC: 10 UG/DL (ref 40–170)
LACTATE BLD-SCNC: 1.9 MMOL/L (ref 0.5–2)
MAGNESIUM SERPL-MCNC: 1.5 MG/DL (ref 1.5–2.5)
MCH RBC QN AUTO: 27.4 PG (ref 27–33)
MCHC RBC AUTO-ENTMCNC: 30.1 G/DL (ref 33.6–35)
MCV RBC AUTO: 90.9 FL (ref 81.4–97.8)
PLATELET # BLD AUTO: 35 K/UL (ref 164–446)
POTASSIUM SERPL-SCNC: 4.5 MMOL/L (ref 3.6–5.5)
RBC # BLD AUTO: 3.4 M/UL (ref 4.2–5.4)
SODIUM SERPL-SCNC: 132 MMOL/L (ref 135–145)
TIBC SERPL-MCNC: 134 UG/DL (ref 250–450)
UIBC SERPL-MCNC: 124 UG/DL (ref 110–370)
WBC # BLD AUTO: 5 K/UL (ref 4.8–10.8)

## 2021-04-09 PROCEDURE — A9270 NON-COVERED ITEM OR SERVICE: HCPCS | Performed by: INTERNAL MEDICINE

## 2021-04-09 PROCEDURE — 700111 HCHG RX REV CODE 636 W/ 250 OVERRIDE (IP): Performed by: INTERNAL MEDICINE

## 2021-04-09 PROCEDURE — 85018 HEMOGLOBIN: CPT

## 2021-04-09 PROCEDURE — 700102 HCHG RX REV CODE 250 W/ 637 OVERRIDE(OP): Performed by: STUDENT IN AN ORGANIZED HEALTH CARE EDUCATION/TRAINING PROGRAM

## 2021-04-09 PROCEDURE — 700111 HCHG RX REV CODE 636 W/ 250 OVERRIDE (IP): Performed by: HOSPITALIST

## 2021-04-09 PROCEDURE — 85014 HEMATOCRIT: CPT

## 2021-04-09 PROCEDURE — 90935 HEMODIALYSIS ONE EVALUATION: CPT

## 2021-04-09 PROCEDURE — 90935 HEMODIALYSIS ONE EVALUATION: CPT | Performed by: INTERNAL MEDICINE

## 2021-04-09 PROCEDURE — 5A1D70Z PERFORMANCE OF URINARY FILTRATION, INTERMITTENT, LESS THAN 6 HOURS PER DAY: ICD-10-PCS | Performed by: INTERNAL MEDICINE

## 2021-04-09 PROCEDURE — A9270 NON-COVERED ITEM OR SERVICE: HCPCS | Performed by: HOSPITALIST

## 2021-04-09 PROCEDURE — 99239 HOSP IP/OBS DSCHRG MGMT >30: CPT | Performed by: STUDENT IN AN ORGANIZED HEALTH CARE EDUCATION/TRAINING PROGRAM

## 2021-04-09 PROCEDURE — 83735 ASSAY OF MAGNESIUM: CPT

## 2021-04-09 PROCEDURE — 85027 COMPLETE CBC AUTOMATED: CPT

## 2021-04-09 PROCEDURE — 700102 HCHG RX REV CODE 250 W/ 637 OVERRIDE(OP): Performed by: INTERNAL MEDICINE

## 2021-04-09 PROCEDURE — A9270 NON-COVERED ITEM OR SERVICE: HCPCS | Performed by: STUDENT IN AN ORGANIZED HEALTH CARE EDUCATION/TRAINING PROGRAM

## 2021-04-09 PROCEDURE — 80048 BASIC METABOLIC PNL TOTAL CA: CPT

## 2021-04-09 PROCEDURE — 700102 HCHG RX REV CODE 250 W/ 637 OVERRIDE(OP): Performed by: HOSPITALIST

## 2021-04-09 PROCEDURE — 700111 HCHG RX REV CODE 636 W/ 250 OVERRIDE (IP): Performed by: STUDENT IN AN ORGANIZED HEALTH CARE EDUCATION/TRAINING PROGRAM

## 2021-04-09 PROCEDURE — 83605 ASSAY OF LACTIC ACID: CPT

## 2021-04-09 RX ORDER — LOSARTAN POTASSIUM 25 MG/1
25 TABLET ORAL DAILY
Qty: 30 TABLET | Refills: 0
Start: 2021-04-09 | End: 2021-10-31

## 2021-04-09 RX ORDER — HYDROMORPHONE HYDROCHLORIDE 1 MG/ML
0.5 INJECTION, SOLUTION INTRAMUSCULAR; INTRAVENOUS; SUBCUTANEOUS ONCE
Status: COMPLETED | OUTPATIENT
Start: 2021-04-09 | End: 2021-04-09

## 2021-04-09 RX ORDER — HYDRALAZINE HYDROCHLORIDE 20 MG/ML
20 INJECTION INTRAMUSCULAR; INTRAVENOUS EVERY 6 HOURS PRN
Status: DISCONTINUED | OUTPATIENT
Start: 2021-04-09 | End: 2021-04-09 | Stop reason: HOSPADM

## 2021-04-09 RX ORDER — AMLODIPINE BESYLATE 5 MG/1
10 TABLET ORAL EVERY EVENING
Status: DISCONTINUED | OUTPATIENT
Start: 2021-04-09 | End: 2021-04-09

## 2021-04-09 RX ORDER — CARVEDILOL 25 MG/1
25 TABLET ORAL 2 TIMES DAILY WITH MEALS
Status: DISCONTINUED | OUTPATIENT
Start: 2021-04-09 | End: 2021-04-09

## 2021-04-09 RX ORDER — ONDANSETRON 4 MG/1
4 TABLET, ORALLY DISINTEGRATING ORAL EVERY 4 HOURS PRN
Qty: 30 TABLET | Refills: 0 | Status: ON HOLD
Start: 2021-04-09 | End: 2021-08-05 | Stop reason: SDUPTHER

## 2021-04-09 RX ORDER — AMLODIPINE BESYLATE 5 MG/1
10 TABLET ORAL EVERY EVENING
Status: DISCONTINUED | OUTPATIENT
Start: 2021-04-09 | End: 2021-04-09 | Stop reason: HOSPADM

## 2021-04-09 RX ORDER — CALCIUM CARBONATE 500 MG/1
500 TABLET, CHEWABLE ORAL EVERY 12 HOURS PRN
Status: DISCONTINUED | OUTPATIENT
Start: 2021-04-09 | End: 2021-04-09 | Stop reason: HOSPADM

## 2021-04-09 RX ORDER — CARVEDILOL 25 MG/1
25 TABLET ORAL 2 TIMES DAILY WITH MEALS
Qty: 60 TABLET | Refills: 0
Start: 2021-04-09 | End: 2021-11-12

## 2021-04-09 RX ORDER — HYDROMORPHONE HYDROCHLORIDE 2 MG/1
1 TABLET ORAL EVERY 4 HOURS PRN
Status: DISCONTINUED | OUTPATIENT
Start: 2021-04-09 | End: 2021-04-09 | Stop reason: HOSPADM

## 2021-04-09 RX ADMIN — HYDROMORPHONE HYDROCHLORIDE 1 MG: 2 TABLET ORAL at 11:25

## 2021-04-09 RX ADMIN — ONDANSETRON 4 MG: 2 INJECTION INTRAMUSCULAR; INTRAVENOUS at 05:34

## 2021-04-09 RX ADMIN — AMLODIPINE BESYLATE 10 MG: 5 TABLET ORAL at 11:26

## 2021-04-09 RX ADMIN — HYDROMORPHONE HYDROCHLORIDE 0.5 MG: 1 INJECTION, SOLUTION INTRAMUSCULAR; INTRAVENOUS; SUBCUTANEOUS at 01:32

## 2021-04-09 RX ADMIN — CALCIUM CARBONATE (ANTACID) CHEW TAB 500 MG 500 MG: 500 CHEW TAB at 00:24

## 2021-04-09 RX ADMIN — FERROUS GLUCONATE TAB 324 MG (37.5 MG ELEMENTAL IRON) 324 MG: 324 (37.5 FE) TAB at 08:19

## 2021-04-09 RX ADMIN — ONDANSETRON 4 MG: 4 TABLET, ORALLY DISINTEGRATING ORAL at 10:08

## 2021-04-09 RX ADMIN — PROCHLORPERAZINE EDISYLATE 5 MG: 5 INJECTION INTRAMUSCULAR; INTRAVENOUS at 11:27

## 2021-04-09 RX ADMIN — MORPHINE SULFATE 1 MG: 4 INJECTION INTRAVENOUS at 09:15

## 2021-04-09 RX ADMIN — Medication 1 TABLET: at 08:18

## 2021-04-09 RX ADMIN — EPOETIN ALFA-EPBX 10000 UNITS: 10000 INJECTION, SOLUTION INTRAVENOUS; SUBCUTANEOUS at 10:50

## 2021-04-09 RX ADMIN — ACETAMINOPHEN 650 MG: 325 TABLET, FILM COATED ORAL at 09:23

## 2021-04-09 RX ADMIN — OMEPRAZOLE 40 MG: 20 CAPSULE, DELAYED RELEASE ORAL at 08:18

## 2021-04-09 ASSESSMENT — PAIN DESCRIPTION - PAIN TYPE
TYPE: ACUTE PAIN
TYPE: ACUTE PAIN;CHRONIC PAIN
TYPE: ACUTE PAIN
TYPE: ACUTE PAIN;CHRONIC PAIN

## 2021-04-09 ASSESSMENT — ENCOUNTER SYMPTOMS
ABDOMINAL PAIN: 1
CHILLS: 0
FEVER: 0
RESPIRATORY NEGATIVE: 1
BLOOD IN STOOL: 0
CONSTITUTIONAL NEGATIVE: 1
CARDIOVASCULAR NEGATIVE: 1
NAUSEA: 1
MUSCULOSKELETAL NEGATIVE: 1
VOMITING: 1
EYES NEGATIVE: 1

## 2021-04-09 ASSESSMENT — FIBROSIS 4 INDEX: FIB4 SCORE: 5.02

## 2021-04-09 NOTE — DIETARY
Nutrition services: Day 1 of admit.  Lily Nicole is a 25 y.o. female with admitting DX of Hematemesis. DX includes anemia associated with blood loss, pancytopenia r/t lupus flare, possible acute lupus flare up, hemorrhagic gastritis with Karen-Keyes tear, hyponatremia, ESRD on dialysis, chronic renal failure with hypoxia, elevated troponin.     Consult received for MST of 4 on nutrition screen due to report of 24-33 lb wt loss x 3 months and poor PO PTA.       Assessment:     There is no height or weight on file to calculate BMI.   BMI classification: unknown at this time.   Diet/Intake: renal/25-50% x 1.     Evaluation:   1. Pt currently does not have a weight this admit. Nursing agreed to weight pt.  2. RD visited pt in her room to discuss wt/PO hx PTA. Pt said that she has lost ~20 lb x 3 months. Based on weight review in Epic, pt weighed 60.1 kg (132 lb) on 1/6/21. Weight loss was 15% x 3 months which is significant. Weight loss is related to diminished appetite due to abdominal problems including nausea and vomiting. Pt said that when she was well she would eat 3 meals a day. Over the past 3 months she only ate 1 meal and a snack daily. She said PO was ~ 1/2 of her normal intake. She said that due to weight loss she had to get her glasses adjusted because they kept falling off. Pt does appear to have some muscle loss noted in temple region. Her cheeks are full which may be due to her prescribed steroid. Her legs and arms are also thin.   3. Pt said that her appetite is improving. PTA pt was drinking Ensure supplements for additional nutrition. MD agreed to order Boost Glucose Control with meals for additional nutrition.    4. Labs: 4/8/21: sodium=132 (L), potassium=4.2, mag=1.6. 4/7/21: phos=4.5  5. Meds: prednisone     Malnutrition Risk: pt with severe malnutrition in the context of chronic disease r/t diminished appetite due to N&V with Lupus dx AEB report of 15% wt loss x 3 months, PO intake 50% of  normal x 3 months and moderate muscle loss noted in temple region.     Recommendations/Plan:  1. Add Boost Glucose Control to meals TID   2. Encourage intake of >50%  3. Document intake of all meals and supplements as % taken in ADL's to provide interdisciplinary communication across all shifts.   4. Monitor weight. Obtain current weight when able.   5. Nutrition rep will continue to see patient for ongoing meal and snack preferences.     RD will follow.

## 2021-04-09 NOTE — PROCEDURES
Nephrology/Hemodialysis note    Patient with ESRD/HD, lupus flare up  Seen and examined during dialysis treatment  Elevated BP  Febrile  C/o back pain  Lab results reviewed  Of  Heparin  Please see dialysis flow sheet for details

## 2021-04-09 NOTE — DISCHARGE SUMMARY
Discharge Summary    CHIEF COMPLAINT ON ADMISSION  Chief Complaint   Patient presents with   • Abdominal Pain     pt was just d/c from this ER about 2 hours ago, abd pain has continued   • Blood in Vomit     about 1 hour PTA, 1 episode of bright red blood in emesis       Reason for Admission  Blood in Vomit     Admission Date  4/7/2021    CODE STATUS  Full Code    HPI & HOSPITAL COURSE  This is a 25 y.o. 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 Hemoglobin noted to be 6.7 and platelet count 27,000 on presentation. GI and nephrology were consulted. GI recommended a gastric emptying study which was unable to be completed due to patient's having persistent nausea and hematemesis. GI also deferred to repeating EGD since she had one done in the last month and at this point they did not think it was going to change the underlying primary diagnosis which they thought was due to her poorly controlled lupus. Her symptoms slowly improved and she was supported with transfusions of PRBC and platelet in the hospital.  She was started on prednisone to help with some control of her disease. Her primary rheumatologist- Dr. Wong was unable to be contacted despite multiple phone calls to his practice.   She was advised at discharge to follow-up with Dr. Wong in consideration for a second opinion and possibly referral to a tertiary Medical Center preferably [facility] or [facility]. She was also advised at discharge to follow-up with her primary hematologist-Dr. Sanchez on Monday for a repeat of her blood count.    Her overall prognosis remains very guarded and she is at high risk for readmission to the hospital.     Therefore, she is discharged in fair and stable condition to home with close outpatient follow-up.    The patient met 2-midnight criteria for an inpatient stay at the time of  discharge.    Discharge Date  4/9/21    FOLLOW UP ITEMS POST DISCHARGE  Rheum  PCP  Hematology    DISCHARGE DIAGNOSES  Principal Problem:    Anemia associated with acute blood loss POA: Yes  Active Problems:    Hemorrhagic gastritis with hematemesis  POA: Yes    Lupus flareup (HCC) POA: Yes    Pancytopenia (HCC) POA: Yes    ESRD (end stage renal disease) on dialysis (HCC) POA: Yes    Hyponatremia POA: Yes    Elevated troponin POA: Yes    Chronic respiratory failure with hypoxia (HCC) POA: Yes    Lactic acidosis POA: Unknown  Resolved Problems:    Chronic kidney disease (CKD) stage G5/A1, glomerular filtration rate (GFR) less than or equal to 15 mL/min/1.73 square meter and albuminuria creatinine ratio less than 30 mg/g ([facility]) POA: Yes      FOLLOW UP  Future Appointments   Date Time Provider Department Center   4/15/2021  9:00 AM INFUSION QUICK INJECT ON[facility] [address]   4/17/2021  9[facility]   4/29/2021  9:[facility]   5/1/2021  9:30 AM [facility] [address] [place]     Ella Matthew M.D.  [address] [address]  [phone]    Call  Please call [facility] to schedule a hospital follow up appointment. Thank you.      MEDICATIONS ON DISCHARGE     Medication List      START taking these medications      Instructions   carvedilol 25 MG Tabs  Commonly known as: COREG   Take 1 tablet by mouth 2 times a day with meals.  Dose: 25 mg     losartan 25 MG Tabs  Commonly known as: COZAAR   Take 1 tablet by mouth every day.  Dose: 25 mg        CONTINUE taking these medications      Instructions   amLODIPine 10 MG Tabs  Commonly known as: NORVASC   Take 1 tablet by mouth every evening.  Dose: 10 mg     furosemide 80 MG Tabs  Commonly known as: Lasix   Take 2 Tablets by mouth 2 Times a Day for 30 days.  Dose: 160 mg     hydroxychloroquine 200 MG Tabs  Commonly known as: Plaquenil   Take 200 mg by mouth every evening.  Dose: 200 mg      mycophenolate 180 MG EC tablet  Commonly known as: Myfortic   Take 1 Tab by mouth every evening.  Dose: 180 mg     ondansetron 4 MG Tbdp  Commonly known as: ZOFRAN ODT   Take 1 tablet by mouth every four hours as needed for Nausea (give PO if no IV route available).  Dose: 4 mg     pantoprazole 40 MG Tbec  Commonly known as: PROTONIX   Take 1 tablet by mouth 2 times a day for 30 days.  Dose: 40 mg     predniSONE 5 MG Tabs  Commonly known as: DELTASONE   Take 5 mg by mouth every evening.  Dose: 5 mg     sucralfate 1 GM/10ML Susp  Commonly known as: CARAFATE   Doctor's comments: Can give tablets at same dosage if pt unable to afford liquid preparation, quantity sufficient for one month  Take 10 mL by mouth 4 Times a Day,Before Meals and at Bedtime.  Dose: 1 g        STOP taking these medications    famotidine 20 MG Tabs  Commonly known as: PEPCID            Allergies  Allergies   Allergen Reactions   • Cephalexin Rash     .   • Clindamycin Rash     .   • Methylprednisolone Unspecified     Anxious   • Metoprolol Rash     .   • Norco [Hydrocodone-Acetaminophen] Nausea       DIET  Orders Placed This Encounter   Procedures   • Diet Order Diet: Renal     Standing Status:   Standing     Number of Occurrences:   1     Order Specific Question:   Diet:     Answer:   Renal [8]       ACTIVITY  As tolerated.  Weight bearing as tolerated    CONSULTATIONS  GI  Nephrology    PROCEDURES  none    LABORATORY  Lab Results   Component Value Date    SODIUM 132 (L) 04/09/2021    POTASSIUM 4.5 04/09/2021    CHLORIDE 92 (L) 04/09/2021    CO2 28 04/09/2021    GLUCOSE 82 04/09/2021    BUN 38 (H) 04/09/2021    CREATININE 5.56 (HH) 04/09/2021        Lab Results   Component Value Date    WBC 5.0 04/09/2021    HEMOGLOBIN 9.3 (L) 04/09/2021    HEMATOCRIT 30.9 (L) 04/09/2021    PLATELETCT 35 (LL) 04/09/2021        Total time of the discharge process exceeds 30 minutes.

## 2021-04-09 NOTE — PROGRESS NOTES
"  Gastroenterology Progress Note     Author: Gadiel Whitney M.D.   Date & Time Created: 4/9/2021 4:27 PM    Chief Complaint:  Hematemesis  Interval History:  BM remain \"normal\" and no nausea. Tolerating po    Review of Systems:  Review of Systems   Constitutional: Positive for malaise/fatigue. Negative for chills and fever.   Gastrointestinal: Positive for nausea and vomiting. Negative for blood in stool and melena.       Physical Exam:  Physical Exam  Constitutional:       Appearance: She is ill-appearing.   HENT:      Head: Atraumatic.   Eyes:      General: No scleral icterus.     Extraocular Movements: Extraocular movements intact.   Cardiovascular:      Rate and Rhythm: Tachycardia present.      Heart sounds: No murmur.   Pulmonary:      Effort: Pulmonary effort is normal.      Breath sounds: Normal breath sounds.   Abdominal:      General: Abdomen is flat. There is no distension.      Palpations: Abdomen is soft.      Tenderness: There is abdominal tenderness. There is no guarding or rebound.   Skin:     General: Skin is warm and dry.   Neurological:      Mental Status: She is alert.   Psychiatric:         Mood and Affect: Mood normal.         Thought Content: Thought content normal.         Labs:          Recent Labs     04/07/21  0839 04/08/21  0017 04/09/21  0115   SODIUM 126* 132* 132*   POTASSIUM 5.0 4.2 4.5   CHLORIDE 89* 94* 92*   CO2 25 31 28   BUN 46* 24* 38*   CREATININE 6.13* 3.87* 5.56*   MAGNESIUM 1.6 1.6 1.5   PHOSPHORUS 4.5  --   --    CALCIUM 9.0 8.6 9.0     Recent Labs     04/07/21  0040 04/07/21  0040 04/07/21  0839 04/08/21  0017 04/09/21  0115   ALTSGPT 7  --  7 7  --    ASTSGOT 14  --  18 15  --    ALKPHOSPHAT 56  --  59 47  --    TBILIRUBIN 0.6  --  0.6 0.7  --    LIPASE 42  --  20  --   --    GLUCOSE 95   < > 73 95 82    < > = values in this interval not displayed.     Recent Labs     04/07/21  0839 04/07/21  1536 04/08/21  0017 04/08/21  0017 04/08/21  0655 04/08/21  1335 04/08/21  1953 " 21  0115 21  0729   RBC 3.29*   < > 2.64*  --  2.49*  --   --   --  3.40*   HEMOGLOBIN 8.8*   < > 7.0*   < > 6.7*   < > 9.0* 9.1* 9.3*   HEMATOCRIT 30.6*   < > 23.7*   < > 22.7*   < > 29.6* 30.0* 30.9*   PLATELETCT 27*   < > 27*  --  26*  --   --   --  35*   PROTHROMBTM 14.1  --   --   --   --   --   --   --   --    APTT 30.9  --   --   --   --   --   --   --   --    INR 1.12  --   --   --   --   --   --   --   --    IRON  --   --  10*  --   --   --   --   --   --    FERRITIN  --   --  778.0*  --   --   --   --   --   --    TOTIRONBC  --   --  134*  --   --   --   --   --   --     < > = values in this interval not displayed.     Recent Labs     21  0040 21  0040 21  0839 21  0839 21  1536 21  1536 21  0017 21  0655 21  0729   WBC 5.2   < > 3.2*   < > 5.9   < > 3.1* 2.4* 5.0   NEUTSPOLYS 82.60*   < > 79.80*   < > 81.30*  --  70.40 68.50  --    LYMPHOCYTES 8.30*   < > 10.60*   < > 4.70*  --  15.00* 14.00*  --    MONOCYTES 7.30   < > 6.50   < > 6.40  --  12.10 10.50  --    EOSINOPHILS 1.20   < > 1.90   < > 6.60  --  1.30 5.70  --    BASOPHILS 0.40   < > 0.90   < > 0.70  --  0.60 0.90  --    ASTSGOT 14  --  18  --   --   --  15  --   --    ALTSGPT 7  --  7  --   --   --  7  --   --    ALKPHOSPHAT 56  --  59  --   --   --  47  --   --    TBILIRUBIN 0.6  --  0.6  --   --   --  0.7  --   --     < > = values in this interval not displayed.     Hemodynamics:  Temp (24hrs), Av.5 °C (99.5 °F), Min:36.6 °C (97.8 °F), Max:39.4 °C (102.9 °F)  Temperature: 37.6 °C (99.7 °F)  Pulse  Av.5  Min: 73  Max: 156   Blood Pressure: 137/83     Respiratory:    Respiration: 20, Pulse Oximetry: 100 %     Work Of Breathing / Effort: Within Normal Limits  RUL Breath Sounds: Clear, RML Breath Sounds: Clear;Diminished, RLL Breath Sounds: Clear;Diminished, PALLAVI Breath Sounds: Clear, LLL Breath Sounds: Clear;Diminished  Fluids:    Intake/Output Summary (Last 24 hours) at  4/8/2021 1241  Last data filed at 4/7/2021 1700  Gross per 24 hour   Intake 600 ml   Output 3000 ml   Net -2400 ml     Weight: 61.2 kg (134 lb 14.7 oz)  GI/Nutrition:  Orders Placed This Encounter   Procedures   • Diet Order Diet: Renal     Standing Status:   Standing     Number of Occurrences:   1     Order Specific Question:   Diet:     Answer:   Renal [8]     Medical Decision Making, by Problem:  Active Hospital Problems    Diagnosis    • *Anemia associated with acute blood loss [D62]    • Lactic acidosis [E87.2]    • Pancytopenia (HCC) [D61.818]    • Lupus (HCC) [M32.9]    • Hemorrhagic gastritis with hematemesis  [K29.71]    • Hyponatremia [E87.1]    • ESRD (end stage renal disease) on dialysis (HCC) [N18.6, Z99.2]    • Chronic respiratory failure with hypoxia (HCC) [J96.11]    • Elevated troponin [R77.8]      Impression and Plan    1) Hematemesis - resolved. Suspect that this is from her known recurrent AVMs/Gastropathy and/or nida antwan tear as have been noted on 2 EGDs in the last month. The primary underlying issue with this I suspect is poor control of her lupus however.  2) Anemia - probably multifactorial from some chronic blood loss, renal failure and her SLE.   3) Gastritis with hemorrhage - see above. No EGD planned at this point. Unlikely that this would change her Dx or if any treatment applied would chance the needs for her to get better control of the underlying causes (ESRD and SLE)   4) ESRD  5) SLE - agree with added steroids.   6) Thrombocytopenia - secondary to SLE    Will sign off    [facility] [place]        Quality-Core Measures

## 2021-04-09 NOTE — PROGRESS NOTES
Per MD, hold Coreg. Plan to discontinue. MD at bedside, notified of sustaining HR in 150s. No new orders. Pt calm, in bed, currently receiving dialysis. Nausea and pain medicated per MAR.

## 2021-04-09 NOTE — CARE PLAN
Problem: Communication  Goal: The ability to communicate needs accurately and effectively will improve  Outcome: PROGRESSING AS EXPECTED  Intervention: [place] patient and significant other/support system to call light to alert staff of needs  Flowsheets (Taken 4/9/2021 0048)  Oriented to:: Call Light & Bedside Controls  Intervention: Educate patient and significant other/support system about the plan of care, procedures, treatments, medications and allow for questions  Note: Educated pt on POC including platelet transfusion, frequent VS monitoring, and pain control. Pt verbalized understanding.     Problem: Pain Management  Goal: Pain level will decrease to patient's comfort goal  Outcome: PROGRESSING SLOWER THAN EXPECTED  Intervention: Follow pain managment plan developed in collaboration with patient and Interdisciplinary Team  Note: Collaborated with physician for pain relief strategy, orders carried out, pt continues to report significant unrelieved pain. Comfort measures provided, physician notified.   Intervention: Educate and implement non-pharmacologic comfort measures. Examples: relaxation, distration, play therapy, activity therapy, massage, etc.  Flowsheets (Taken 4/9/2021 0048)  Intervention:   [place]   Rest   Medication (see MAR)   Warm Pack

## 2021-04-09 NOTE — PROGRESS NOTES
0830 Pt aox4, pt reporting back and abdominal pain, unrelieved by current dose of morphine. Pt requesting higher dose of pain medication, MD notified, no new orders received. Non pharmocologic interventions in place including distraction, rest, reposition. Pt agreeable to take ordered 1 mg morphine. See MAR. Discussed plan for nausea management, including available PRN. Pt educated on importance of spacing med admin of zofran per order. POC reviewed including plan for dialysis. Questions addressed. Assessment complete, call light within reach, pt instructed to call for needs.

## 2021-04-09 NOTE — PROGRESS NOTES
[facility] Medicine Daily Progress Note    Date of Service  4/9/2021    Chief Complaint  25 y.o. female admitted 4/7/2021 with hematemesis and significant pancytopenia.    Hospital Course  25 y.o. female admitted 4/7/2021 with hematemesis and significant pancytopenia. Symptoms are concerning for acute lupus flareup.  Patient initiated on steroids as well as home therapy- Myfortic and Plaquenil. GI nephrology closely following.    Interval Problem Update  Patient seen and examined at bedside this morning.  No acute events overnight.   Notes abdominal pain much improved on examination this morning.  Was able to tolerate p.o. diet yesterday without any significant problems. She had complaints of chronic back pain and wanted something different for pain control.  Hemoglobin up to 9.3 and platelet count up to 35,000 this morning.   Spoke to patient at length regarding close outpatient follow-up with her rheumatologist and subsequently for second opinion regarding her poorly controlled lupus.    Consultants/Specialty  GI  Nephrology    Code Status  Full Code    Disposition  home    Review of Systems  Review of Systems   Constitutional: Negative.    Eyes: Negative.    Respiratory: Negative.    Cardiovascular: Negative.    Gastrointestinal: Positive for abdominal pain, nausea and vomiting.   Musculoskeletal: Negative.    All other systems reviewed and are negative.       Physical Exam  Temp:  [36.4 °C (97.5 °F)-39.4 °C (102.9 °F)] 39.4 °C (102.9 °F)  Pulse:  [] 105  Resp:  [16-20] 18  BP: (122-169)/() 169/114  SpO2:  [90 %-100 %] 98 %    Physical Exam  Constitutional:       General: She is not in acute distress.     Appearance: She is ill-appearing.   HENT:      Head: Normocephalic and atraumatic.      Nose: Nose normal.   Eyes:      Conjunctiva/sclera: Conjunctivae normal.   Cardiovascular:      Rate and Rhythm: Regular rhythm. Tachycardia present.   Pulmonary:      Effort: Pulmonary effort is normal.      Breath  sounds: Normal breath sounds.   Abdominal:      General: Bowel sounds are normal.      Palpations: Abdomen is soft.   Musculoskeletal:         General: Normal range of motion.      Cervical back: Normal range of motion.      Comments: Right arm AVF noted with thrill   Skin:     General: Skin is dry.   Neurological:      General: No focal deficit present.      Mental Status: She is alert and oriented to person, place, and time.   Psychiatric:         Mood and Affect: Mood normal.         Behavior: Behavior normal.         Fluids    Intake/Output Summary (Last 24 hours) at 4/9/2021 1023  Last data filed at 4/8/2021 1645  Gross per 24 hour   Intake 359.58 ml   Output --   Net 359.58 ml       Laboratory  Recent Labs     04/08/21  0017 04/08/21  0017 04/08/21  0655 04/08/21  1335 04/08/21  1953 04/09/21  0115 04/09/21  0729   WBC 3.1*  --  2.4*  --   --   --  5.0   RBC 2.64*  --  2.49*  --   --   --  3.40*   HEMOGLOBIN 7.0*   < > 6.7*   < > 9.0* 9.1* 9.3*   HEMATOCRIT 23.7*   < > 22.7*   < > 29.6* 30.0* 30.9*   MCV 89.8  --  91.2  --   --   --  90.9   MCH 26.5*  --  26.9*  --   --   --  27.4   MCHC 29.5*  --  29.5*  --   --   --  30.1*   RDW 61.3*  --  61.8*  --   --   --  56.8*   PLATELETCT 27*  --  26*  --   --   --  35*    < > = values in this interval not displayed.     Recent Labs     04/07/21  0839 04/08/21  0017 04/09/21  0115   SODIUM 126* 132* 132*   POTASSIUM 5.0 4.2 4.5   CHLORIDE 89* 94* 92*   CO2 25 31 28   GLUCOSE 73 95 82   BUN 46* 24* 38*   CREATININE 6.13* 3.87* 5.56*   CALCIUM 9.0 8.6 9.0     Recent Labs     04/07/21  0839   APTT 30.9   INR 1.12               Imaging  No orders to display        Assessment/Plan  * Anemia associated with acute blood loss- (present on admission)  Assessment & Plan  Multifactorial including SLE, kidney failure and hematemesis  Hemoglobin around 8.8  Continue monitoring every 12 hours  PPI and sucralfate  Transfusion after hemoglobin less than 7    Pancytopenia (HCC)-  (present on admission)  Assessment & Plan  Related to lupus flare  With significant thrombocytopenia and anemia.  She gets intermittent transfusions outpatient.    Started on steroids to help with lupus flareup.  Closely monitor blood count and transfuse as needed.  Trend hemoglobin if less than 7.  With active bleeding, will transfuse platelet count to greater than 50,000.  IV fluids as needed.    Lupus flareup (HCC)- (present on admission)  Assessment & Plan  History of lupus with multiple admissions in the past for symptoms concerning for lupus flare.  She has pain on her knees wrist and fingers with pancytopenia      Increase prednisone to 50 mg daily and continue hydroxychloroquine and mycophenolate.  We will get in contact with outpatient rheumatologist for any further recommendations.   With her disease not well controlled, she may benefit from second opinion from a tertiary center.    Hemorrhagic gastritis with hematemesis - (present on admission)  Assessment & Plan  Last EGD showed gastritis with Karen-Keyes  Continue with PPI and sucralfate  GI following.  Closely monitor blood count and transfuse as needed.    Hyponatremia- (present on admission)  Assessment & Plan  Likely related to end-stage kidney disease  Nephrology following for hemodialysis needs.  Free water restriction  Labs daily    ESRD (end stage renal disease) on dialysis (HCC)- (present on admission)  Assessment & Plan  Due to SLE, dialysis Monday Wednesday and Friday.   Patient is on dialysis 3 times a week  Nephrology been consulted.  Daily renal function.  Renally dose all medications.    Lactic acidosis  Assessment & Plan  Likely related to inflammation, vomiting and dehydration  Fluid IV gently and close monitoring for respiratory status    Chronic respiratory failure with hypoxia (HCC)- (present on admission)  Assessment & Plan  Patient is on oxygen 2 L nasal cannula, on her baseline  No changes and no coughing.    Elevated troponin-  (present on admission)  Assessment & Plan  Her troponin around 900, her  troponin last months was high also  Likely related to kidney failure  EKG no changes and no ischemic  Continue monitoring closely.  Patient is high risk for pericardial effusion, pericarditis, myocarditis or MI  Last echo on 3/29/2021 showed ejection fraction 45% with no valve diseases.         VTE prophylaxis: scds

## 2021-04-09 NOTE — CARE PLAN
Problem: Communication  Goal: The ability to communicate needs accurately and effectively will improve  Outcome: PROGRESSING AS EXPECTED     Problem: Safety  Goal: Will remain free from injury  Outcome: PROGRESSING AS EXPECTED  Goal: Will remain free from falls  Outcome: PROGRESSING AS EXPECTED     Problem: Pain Management  Goal: Pain level will decrease to patient's comfort goal  Outcome: PROGRESSING SLOWER THAN EXPECTED   Implement non pharmacologic interventions including rest, distraction, heat/ice.

## 2021-04-09 NOTE — PROGRESS NOTES
Report received from Antonia MARTINEZ. Assumed pt care. AOx4. Reporting 6/10 abdominal pain, declines interventions at this time. Denies any other distress. Discussed POC. Pt verbalized understanding.  Hourly rounding in place. Fall precautions in place and call lights w/in reach.

## 2021-04-09 NOTE — PROGRESS NOTES
"Pt reporting nausea, refusing all morning meds except zofran. States \"they make my stomach upset\" and requests to take them with breakfast. Educated pt on zofran as an antiemetic and suggested the possibility of attempting meds after zofran administration, pt refused. Med administration time moved.   "

## 2021-04-09 NOTE — DISCHARGE INSTRUCTIONS
Discharge Instructions    Discharged to home by car with friend. Discharged via wheelchair, hospital escort: Refused.  Special equipment needed: Not Applicable    Be sure to schedule a follow-up appointment with your primary care doctor or any specialists as instructed.     Discharge Plan:        I understand that a diet low in cholesterol, fat, and sodium is recommended for good health. Unless I have been given specific instructions below for another diet, I accept this instruction as my diet prescription.   Other diet:  Renal    Special Instructions: None    · Is patient discharged on Warfarin / Coumadin?   No     Depression / Suicide Risk    As you are discharged from this [facility] facility, it is important to learn how to keep safe from harming yourself.    Recognize the warning signs:  · Abrupt changes in personality, positive or negative- including increase in energy   · Giving away possessions  · Change in eating patterns- significant weight changes-  positive or negative  · Change in sleeping patterns- unable to sleep or sleeping all the time   · Unwillingness or inability to communicate  · Depression  · Unusual sadness, discouragement and loneliness  · Talk of wanting to die  · Neglect of personal appearance   · Rebelliousness- reckless behavior  · Withdrawal from people/activities they love  · Confusion- inability to concentrate     If you or a loved one observes any of these behaviors or has concerns about self-harm, here's what you can do:  · Talk about it- your feelings and reasons for harming yourself  · Remove any means that you might use to hurt yourself (examples: pills, rope, extension cords, firearm)  · Get professional help from the community (Mental Health, Substance Abuse, psychological counseling)  · Do not be alone:Call your Safe Contact- someone whom you trust who will be there for you.  · Call your local CRISIS HOTLINE 911-0346 or 700-341-7831  · Call your local Children's Mobile  Crisis Response Team [place] (601) 310-5185 or www.[place].Connectloud  · Call the toll free National Suicide Prevention Hotlines   · National Suicide Prevention Lifeline 645-460-YQYD (9078)  · National Hope Line Network 800-SUICIDE (134-5263)

## 2021-04-09 NOTE — PROGRESS NOTES
Notified MD of hypertension up to SBP 180s and no PRN. Orders received, per MD, give amlodipine and coreg now.

## 2021-04-09 NOTE — PROGRESS NOTES
[facility] Services Progress Note     Hemodialysis treatment x 3 hours performed per Dr. Trent.  Treatment started at 0926 and ended at 1226.      Patient tolerated treatment well. Patient with elevated blood pressure pre and during treatment SBPs at 180s -190s over DBPs of 100s-110s. BP gradually stabilized during last hour of treatment. BP post treatment 120s/70s. Patient VT high pre, during and post  treatment ranging from 120-150 bpm. Pt febrile pre and post treatment. See Vital Signs flow sheets. UF goal adjusted per BP tolerance. See Acute HD flow sheets for details.     Total Net UF Removed:  2,700 mL (see Dialysis I & O Flowsheet)     Post treatment: Cannulation needles removed from RUE AVF access sites, hemostasis established on each insertion site; verified no bleeding. (+) bruit/thrill post treatment. Covered with clean gauze dressings and secured with tape.     Please monitor for AVF access bleeding.  Notify Dialysis and Nephrologist for follow-up.     Report given to primary care nurse CHANCE Rodriguez RN.

## 2021-04-10 NOTE — PROGRESS NOTES
Discharge education completed with pt and pt's mother at bedside. Education included follow up appointments, medication, s/s to seek medical care, activity, diet. PIV removed. Questions addressed. Pt d/c on 4L nc, calm, escorted via RN in w/c.

## 2021-04-12 LAB
BACTERIA BLD CULT: NORMAL
SIGNIFICANT IND 70042: NORMAL
SITE SITE: NORMAL
SOURCE SOURCE: NORMAL

## 2021-04-13 LAB
BACTERIA BLD CULT: NORMAL
SIGNIFICANT IND 70042: NORMAL
SITE SITE: NORMAL
SOURCE SOURCE: NORMAL

## 2021-04-14 ENCOUNTER — HOSPITAL ENCOUNTER (OUTPATIENT)
Dept: RADIOLOGY | Facility: MEDICAL CENTER | Age: 26
End: 2021-04-14
Attending: INTERNAL MEDICINE
Payer: COMMERCIAL

## 2021-04-14 DIAGNOSIS — R06.00 DYSPNEA, PAROXYSMAL NOCTURNAL: ICD-10-CM

## 2021-04-14 PROCEDURE — 71250 CT THORAX DX C-: CPT

## 2021-04-15 ENCOUNTER — OUTPATIENT INFUSION SERVICES (OUTPATIENT)
Dept: ONCOLOGY | Facility: MEDICAL CENTER | Age: 26
End: 2021-04-15
Attending: INTERNAL MEDICINE
Payer: COMMERCIAL

## 2021-04-15 VITALS
SYSTOLIC BLOOD PRESSURE: 154 MMHG | OXYGEN SATURATION: 100 % | HEART RATE: 109 BPM | RESPIRATION RATE: 18 BRPM | TEMPERATURE: 97.1 F | DIASTOLIC BLOOD PRESSURE: 114 MMHG

## 2021-04-15 DIAGNOSIS — D64.9 SYMPTOMATIC ANEMIA: ICD-10-CM

## 2021-04-15 LAB
ABO GROUP BLD: NORMAL
ANISOCYTOSIS BLD QL SMEAR: ABNORMAL
BASOPHILS # BLD AUTO: 0.7 % (ref 0–1.8)
BASOPHILS # BLD: 0.02 K/UL (ref 0–0.12)
BLD GP AB SCN SERPL QL: NORMAL
COMMENT 1642: NORMAL
EOSINOPHIL # BLD AUTO: 0.14 K/UL (ref 0–0.51)
EOSINOPHIL NFR BLD: 4.7 % (ref 0–6.9)
ERYTHROCYTE [DISTWIDTH] IN BLOOD BY AUTOMATED COUNT: 57.9 FL (ref 35.9–50)
HCT VFR BLD AUTO: 22 % (ref 37–47)
HGB BLD-MCNC: 6.4 G/DL (ref 12–16)
HYPOCHROMIA BLD QL SMEAR: ABNORMAL
IMM GRANULOCYTES # BLD AUTO: 0.02 K/UL (ref 0–0.11)
IMM GRANULOCYTES NFR BLD AUTO: 0.7 % (ref 0–0.9)
LYMPHOCYTES # BLD AUTO: 0.42 K/UL (ref 1–4.8)
LYMPHOCYTES NFR BLD: 14 % (ref 22–41)
MCH RBC QN AUTO: 26.7 PG (ref 27–33)
MCHC RBC AUTO-ENTMCNC: 29.1 G/DL (ref 33.6–35)
MCV RBC AUTO: 91.7 FL (ref 81.4–97.8)
MICROCYTES BLD QL SMEAR: ABNORMAL
MONOCYTES # BLD AUTO: 0.38 K/UL (ref 0–0.85)
MONOCYTES NFR BLD AUTO: 12.7 % (ref 0–13.4)
MORPHOLOGY BLD-IMP: NORMAL
NEUTROPHILS # BLD AUTO: 2.01 K/UL (ref 2–7.15)
NEUTROPHILS NFR BLD: 67.2 % (ref 44–72)
NRBC # BLD AUTO: 0 K/UL
NRBC BLD-RTO: 0 /100 WBC
OVALOCYTES BLD QL SMEAR: NORMAL
PLATELET # BLD AUTO: 107 K/UL (ref 164–446)
PLATELET BLD QL SMEAR: NORMAL
PMV BLD AUTO: 12.5 FL (ref 9–12.9)
POIKILOCYTOSIS BLD QL SMEAR: NORMAL
RBC # BLD AUTO: 2.4 M/UL (ref 4.2–5.4)
RBC BLD AUTO: PRESENT
RH BLD: NORMAL
WBC # BLD AUTO: 3 K/UL (ref 4.8–10.8)

## 2021-04-15 PROCEDURE — 36415 COLL VENOUS BLD VENIPUNCTURE: CPT

## 2021-04-15 PROCEDURE — 86850 RBC ANTIBODY SCREEN: CPT

## 2021-04-15 PROCEDURE — 86900 BLOOD TYPING SEROLOGIC ABO: CPT

## 2021-04-15 PROCEDURE — 86901 BLOOD TYPING SEROLOGIC RH(D): CPT

## 2021-04-15 PROCEDURE — 85025 COMPLETE CBC W/AUTO DIFF WBC: CPT

## 2021-04-15 RX ORDER — ACETAMINOPHEN 325 MG/1
650 TABLET ORAL ONCE
Status: CANCELLED | OUTPATIENT
Start: 2021-04-15

## 2021-04-15 RX ORDER — SODIUM CHLORIDE 9 MG/ML
INJECTION, SOLUTION INTRAVENOUS CONTINUOUS
Status: CANCELLED | OUTPATIENT
Start: 2021-04-15

## 2021-04-15 RX ORDER — 0.9 % SODIUM CHLORIDE 0.9 %
10 VIAL (ML) INJECTION PRN
Status: CANCELLED | OUTPATIENT
Start: 2021-04-15

## 2021-04-15 RX ORDER — 0.9 % SODIUM CHLORIDE 0.9 %
3 VIAL (ML) INJECTION PRN
Status: CANCELLED | OUTPATIENT
Start: 2021-04-15

## 2021-04-15 RX ORDER — HEPARIN SODIUM (PORCINE) LOCK FLUSH IV SOLN 100 UNIT/ML 100 UNIT/ML
500 SOLUTION INTRAVENOUS PRN
Status: CANCELLED | OUTPATIENT
Start: 2021-04-15

## 2021-04-15 RX ORDER — ACETAMINOPHEN 325 MG/1
650 TABLET ORAL PRN
Status: CANCELLED | OUTPATIENT
Start: 2021-04-15

## 2021-04-15 RX ORDER — DIPHENHYDRAMINE HCL 25 MG
25 TABLET ORAL ONCE
Status: CANCELLED | OUTPATIENT
Start: 2021-04-15 | End: 2021-04-15

## 2021-04-15 RX ORDER — 0.9 % SODIUM CHLORIDE 0.9 %
VIAL (ML) INJECTION PRN
Status: CANCELLED | OUTPATIENT
Start: 2021-04-15

## 2021-04-15 RX ORDER — DIPHENHYDRAMINE HYDROCHLORIDE 50 MG/ML
25 INJECTION INTRAMUSCULAR; INTRAVENOUS PRN
Status: CANCELLED | OUTPATIENT
Start: 2021-04-15

## 2021-04-15 NOTE — PROGRESS NOTES
Pt arrives to [facility] for lab draw.  Pt has blood transfusion scheduled for 4/17/2021.  Pt reports increased SOB with exertion.  Pt uses supplemental oxygen at 3L/min via NC today.  Pt denies dizziness or chest discomfort.  Labs drawn via 23g butterfly needle to left wrist.  Site wrapped with pressure dressing.  Gave pt a copy of schedule.  Pt dc home with her mother as transportation.

## 2021-04-15 NOTE — PROGRESS NOTES
CBC reviewed and hemoglobin is 6.4 today.  Pt meets parameters for 2 units irradiated, CMV safe PRBC.  COD sent.  Blood bank communication ordered for transfusion scheduled on 4/17/2021.  Spoke to Jillian in the lab and confirmed received BB communication.  Informed pt of plan of care.

## 2021-04-16 PROCEDURE — 86902 BLOOD TYPE ANTIGEN DONOR EA: CPT | Mod: 91

## 2021-04-17 ENCOUNTER — OUTPATIENT INFUSION SERVICES (OUTPATIENT)
Dept: ONCOLOGY | Facility: MEDICAL CENTER | Age: 26
End: 2021-04-17
Attending: INTERNAL MEDICINE
Payer: COMMERCIAL

## 2021-04-17 ENCOUNTER — HOSPITAL ENCOUNTER (INPATIENT)
Facility: MEDICAL CENTER | Age: 26
LOS: 1 days | DRG: 640 | End: 2021-04-19
Attending: EMERGENCY MEDICINE | Admitting: INTERNAL MEDICINE
Payer: COMMERCIAL

## 2021-04-17 ENCOUNTER — APPOINTMENT (OUTPATIENT)
Dept: RADIOLOGY | Facility: MEDICAL CENTER | Age: 26
DRG: 640 | End: 2021-04-17
Attending: EMERGENCY MEDICINE
Payer: COMMERCIAL

## 2021-04-17 VITALS
OXYGEN SATURATION: 100 % | BODY MASS INDEX: 19.83 KG/M2 | SYSTOLIC BLOOD PRESSURE: 155 MMHG | RESPIRATION RATE: 20 BRPM | HEIGHT: 65 IN | WEIGHT: 119.05 LBS | HEART RATE: 98 BPM | TEMPERATURE: 97.4 F | DIASTOLIC BLOOD PRESSURE: 101 MMHG

## 2021-04-17 DIAGNOSIS — R06.02 SHORTNESS OF BREATH: ICD-10-CM

## 2021-04-17 DIAGNOSIS — D64.9 SYMPTOMATIC ANEMIA: ICD-10-CM

## 2021-04-17 DIAGNOSIS — J96.11 CHRONIC RESPIRATORY FAILURE WITH HYPOXIA (HCC): ICD-10-CM

## 2021-04-17 LAB
BARCODED ABORH UBTYP: 5100
BARCODED ABORH UBTYP: 5100
BARCODED PRD CODE UBPRD: NORMAL
BARCODED PRD CODE UBPRD: NORMAL
BARCODED UNIT NUM UBUNT: NORMAL
BARCODED UNIT NUM UBUNT: NORMAL
COMPONENT R 8504R: NORMAL
COMPONENT R 8504R: NORMAL
LACTATE BLD-SCNC: 1.5 MMOL/L (ref 0.5–2)
PRODUCT TYPE UPROD: NORMAL
PRODUCT TYPE UPROD: NORMAL
UNIT STATUS USTAT: NORMAL
UNIT STATUS USTAT: NORMAL

## 2021-04-17 PROCEDURE — 80053 COMPREHEN METABOLIC PANEL: CPT

## 2021-04-17 PROCEDURE — 83605 ASSAY OF LACTIC ACID: CPT

## 2021-04-17 PROCEDURE — 85025 COMPLETE CBC W/AUTO DIFF WBC: CPT

## 2021-04-17 PROCEDURE — 96374 THER/PROPH/DIAG INJ IV PUSH: CPT

## 2021-04-17 PROCEDURE — 700111 HCHG RX REV CODE 636 W/ 250 OVERRIDE (IP): Performed by: EMERGENCY MEDICINE

## 2021-04-17 PROCEDURE — 86850 RBC ANTIBODY SCREEN: CPT

## 2021-04-17 PROCEDURE — 36430 TRANSFUSION BLD/BLD COMPNT: CPT

## 2021-04-17 PROCEDURE — A9270 NON-COVERED ITEM OR SERVICE: HCPCS | Performed by: INTERNAL MEDICINE

## 2021-04-17 PROCEDURE — 700102 HCHG RX REV CODE 250 W/ 637 OVERRIDE(OP): Performed by: INTERNAL MEDICINE

## 2021-04-17 PROCEDURE — P9016 RBC LEUKOCYTES REDUCED: HCPCS | Mod: 91

## 2021-04-17 PROCEDURE — 83880 ASSAY OF NATRIURETIC PEPTIDE: CPT

## 2021-04-17 PROCEDURE — 86901 BLOOD TYPING SEROLOGIC RH(D): CPT

## 2021-04-17 PROCEDURE — 99285 EMERGENCY DEPT VISIT HI MDM: CPT

## 2021-04-17 PROCEDURE — 700111 HCHG RX REV CODE 636 W/ 250 OVERRIDE (IP): Performed by: INTERNAL MEDICINE

## 2021-04-17 PROCEDURE — 96375 TX/PRO/DX INJ NEW DRUG ADDON: CPT

## 2021-04-17 PROCEDURE — 306780 HCHG STAT FOR TRANSFUSION PER CASE

## 2021-04-17 PROCEDURE — 71045 X-RAY EXAM CHEST 1 VIEW: CPT

## 2021-04-17 PROCEDURE — 93005 ELECTROCARDIOGRAM TRACING: CPT | Performed by: EMERGENCY MEDICINE

## 2021-04-17 PROCEDURE — 86945 BLOOD PRODUCT/IRRADIATION: CPT | Mod: 91

## 2021-04-17 PROCEDURE — 36415 COLL VENOUS BLD VENIPUNCTURE: CPT

## 2021-04-17 PROCEDURE — 86922 COMPATIBILITY TEST ANTIGLOB: CPT

## 2021-04-17 PROCEDURE — 84484 ASSAY OF TROPONIN QUANT: CPT

## 2021-04-17 RX ORDER — DIPHENHYDRAMINE HYDROCHLORIDE 50 MG/ML
25 INJECTION INTRAMUSCULAR; INTRAVENOUS PRN
Status: CANCELLED | OUTPATIENT
Start: 2021-04-17

## 2021-04-17 RX ORDER — 0.9 % SODIUM CHLORIDE 0.9 %
10 VIAL (ML) INJECTION PRN
Status: CANCELLED | OUTPATIENT
Start: 2021-04-17

## 2021-04-17 RX ORDER — HEPARIN SODIUM (PORCINE) LOCK FLUSH IV SOLN 100 UNIT/ML 100 UNIT/ML
500 SOLUTION INTRAVENOUS PRN
Status: CANCELLED | OUTPATIENT
Start: 2021-04-17

## 2021-04-17 RX ORDER — ONDANSETRON 2 MG/ML
4 INJECTION INTRAMUSCULAR; INTRAVENOUS ONCE
Status: COMPLETED | OUTPATIENT
Start: 2021-04-18 | End: 2021-04-17

## 2021-04-17 RX ORDER — SODIUM CHLORIDE 9 MG/ML
INJECTION, SOLUTION INTRAVENOUS CONTINUOUS
Status: CANCELLED | OUTPATIENT
Start: 2021-04-17

## 2021-04-17 RX ORDER — DIPHENHYDRAMINE HCL 25 MG
25 TABLET ORAL ONCE
Status: CANCELLED | OUTPATIENT
Start: 2021-04-17 | End: 2021-04-17

## 2021-04-17 RX ORDER — ACETAMINOPHEN 325 MG/1
650 TABLET ORAL ONCE
Status: COMPLETED | OUTPATIENT
Start: 2021-04-17 | End: 2021-04-17

## 2021-04-17 RX ORDER — ACETAMINOPHEN 325 MG/1
650 TABLET ORAL PRN
Status: CANCELLED | OUTPATIENT
Start: 2021-04-17

## 2021-04-17 RX ORDER — FUROSEMIDE 10 MG/ML
60 INJECTION INTRAMUSCULAR; INTRAVENOUS ONCE
Status: COMPLETED | OUTPATIENT
Start: 2021-04-18 | End: 2021-04-17

## 2021-04-17 RX ORDER — FAMOTIDINE 20 MG/1
20 TABLET, FILM COATED ORAL 2 TIMES DAILY
Status: DISCONTINUED | OUTPATIENT
Start: 2021-04-18 | End: 2021-04-18

## 2021-04-17 RX ORDER — 0.9 % SODIUM CHLORIDE 0.9 %
3 VIAL (ML) INJECTION PRN
Status: CANCELLED | OUTPATIENT
Start: 2021-04-17

## 2021-04-17 RX ORDER — ACETAMINOPHEN 325 MG/1
650 TABLET ORAL ONCE
Status: CANCELLED | OUTPATIENT
Start: 2021-04-17

## 2021-04-17 RX ORDER — DIPHENHYDRAMINE HCL 25 MG
25 TABLET ORAL ONCE
Status: DISCONTINUED | OUTPATIENT
Start: 2021-04-17 | End: 2021-04-17 | Stop reason: HOSPADM

## 2021-04-17 RX ORDER — MORPHINE SULFATE 4 MG/ML
4 INJECTION, SOLUTION INTRAMUSCULAR; INTRAVENOUS ONCE
Status: COMPLETED | OUTPATIENT
Start: 2021-04-18 | End: 2021-04-17

## 2021-04-17 RX ORDER — 0.9 % SODIUM CHLORIDE 0.9 %
VIAL (ML) INJECTION PRN
Status: CANCELLED | OUTPATIENT
Start: 2021-04-17

## 2021-04-17 RX ORDER — DIPHENHYDRAMINE HYDROCHLORIDE 50 MG/ML
25 INJECTION INTRAMUSCULAR; INTRAVENOUS PRN
Status: COMPLETED | OUTPATIENT
Start: 2021-04-17 | End: 2021-04-17

## 2021-04-17 RX ADMIN — FUROSEMIDE 60 MG: 10 INJECTION, SOLUTION INTRAMUSCULAR; INTRAVENOUS at 23:50

## 2021-04-17 RX ADMIN — ONDANSETRON 4 MG: 2 INJECTION INTRAMUSCULAR; INTRAVENOUS at 23:50

## 2021-04-17 RX ADMIN — ACETAMINOPHEN 650 MG: 325 TABLET ORAL at 10:02

## 2021-04-17 RX ADMIN — MORPHINE SULFATE 4 MG: 4 INJECTION INTRAVENOUS at 23:50

## 2021-04-17 RX ADMIN — DIPHENHYDRAMINE HYDROCHLORIDE 25 MG: 50 INJECTION INTRAMUSCULAR; INTRAVENOUS at 10:02

## 2021-04-17 ASSESSMENT — FIBROSIS 4 INDEX
FIB4 SCORE: 1.22

## 2021-04-17 NOTE — PROGRESS NOTES
Patient to [facility] for 2 units of PRBC's. PIV inserted into left wrist, flushed with + blood return. Premeds given. 1 unit of PRBC's infused with no s/s of infusion reaction. 2nd unit of PRBC's infused with no s/s of infusion reaction. Patient was a little nauseous, this RN gave her an ice pack for the back of her neck and this alleviated the nausea. Patients BP runs high, and she takes her HTN medication. Patient has Dialysis fistula in right arm so left arm for BP's and sticks. VS taken per protocol. Patient has future appointment. PIV flushed with + blood return and removed. Gauze and Coban applied as a dressing. Patient to home in care of self.

## 2021-04-18 PROBLEM — J96.21 ACUTE ON CHRONIC RESPIRATORY FAILURE WITH HYPOXIA (HCC): Status: ACTIVE | Noted: 2021-03-19

## 2021-04-18 PROBLEM — I16.0 HYPERTENSIVE URGENCY: Status: ACTIVE | Noted: 2021-04-18

## 2021-04-18 LAB
ABO GROUP BLD: NORMAL
ALBUMIN SERPL BCP-MCNC: 3 G/DL (ref 3.2–4.9)
ALBUMIN/GLOB SERPL: 0.7 G/DL
ALP SERPL-CCNC: 70 U/L (ref 30–99)
ALT SERPL-CCNC: 5 U/L (ref 2–50)
ANION GAP SERPL CALC-SCNC: 9 MMOL/L (ref 7–16)
ANION GAP SERPL CALC-SCNC: 9 MMOL/L (ref 7–16)
AST SERPL-CCNC: 13 U/L (ref 12–45)
BASOPHILS # BLD AUTO: 0.6 % (ref 0–1.8)
BASOPHILS # BLD: 0.05 K/UL (ref 0–0.12)
BILIRUB SERPL-MCNC: 0.7 MG/DL (ref 0.1–1.5)
BLD GP AB SCN SERPL QL: NORMAL
BUN SERPL-MCNC: 33 MG/DL (ref 8–22)
BUN SERPL-MCNC: 38 MG/DL (ref 8–22)
CALCIUM SERPL-MCNC: 9 MG/DL (ref 8.4–10.2)
CALCIUM SERPL-MCNC: 9.2 MG/DL (ref 8.4–10.2)
CHLORIDE SERPL-SCNC: 85 MMOL/L (ref 96–112)
CHLORIDE SERPL-SCNC: 86 MMOL/L (ref 96–112)
CO2 SERPL-SCNC: 29 MMOL/L (ref 20–33)
CO2 SERPL-SCNC: 29 MMOL/L (ref 20–33)
CREAT SERPL-MCNC: 4.04 MG/DL (ref 0.5–1.4)
CREAT SERPL-MCNC: 4.47 MG/DL (ref 0.5–1.4)
EKG IMPRESSION: NORMAL
EKG IMPRESSION: NORMAL
EOSINOPHIL # BLD AUTO: 0.06 K/UL (ref 0–0.51)
EOSINOPHIL NFR BLD: 0.8 % (ref 0–6.9)
ERYTHROCYTE [DISTWIDTH] IN BLOOD BY AUTOMATED COUNT: 55.5 FL (ref 35.9–50)
ERYTHROCYTE [DISTWIDTH] IN BLOOD BY AUTOMATED COUNT: 55.6 FL (ref 35.9–50)
GLOBULIN SER CALC-MCNC: 4.2 G/DL (ref 1.9–3.5)
GLUCOSE SERPL-MCNC: 75 MG/DL (ref 65–99)
GLUCOSE SERPL-MCNC: 93 MG/DL (ref 65–99)
HCT VFR BLD AUTO: 29 % (ref 37–47)
HCT VFR BLD AUTO: 29.3 % (ref 37–47)
HGB BLD-MCNC: 8.9 G/DL (ref 12–16)
HGB BLD-MCNC: 9.1 G/DL (ref 12–16)
IMM GRANULOCYTES # BLD AUTO: 0.08 K/UL (ref 0–0.11)
IMM GRANULOCYTES NFR BLD AUTO: 1 % (ref 0–0.9)
LYMPHOCYTES # BLD AUTO: 0.61 K/UL (ref 1–4.8)
LYMPHOCYTES NFR BLD: 7.9 % (ref 22–41)
MCH RBC QN AUTO: 26.2 PG (ref 27–33)
MCH RBC QN AUTO: 26.3 PG (ref 27–33)
MCHC RBC AUTO-ENTMCNC: 30.7 G/DL (ref 33.6–35)
MCHC RBC AUTO-ENTMCNC: 31.1 G/DL (ref 33.6–35)
MCV RBC AUTO: 84.4 FL (ref 81.4–97.8)
MCV RBC AUTO: 85.8 FL (ref 81.4–97.8)
MONOCYTES # BLD AUTO: 0.64 K/UL (ref 0–0.85)
MONOCYTES NFR BLD AUTO: 8.2 % (ref 0–13.4)
NEUTROPHILS # BLD AUTO: 6.33 K/UL (ref 2–7.15)
NEUTROPHILS NFR BLD: 81.5 % (ref 44–72)
NRBC # BLD AUTO: 0 K/UL
NRBC BLD-RTO: 0 /100 WBC
NT-PROBNP SERPL IA-MCNC: ABNORMAL PG/ML (ref 0–125)
PLATELET # BLD AUTO: 173 K/UL (ref 164–446)
PLATELET # BLD AUTO: 190 K/UL (ref 164–446)
PMV BLD AUTO: 10.2 FL (ref 9–12.9)
PMV BLD AUTO: 9.9 FL (ref 9–12.9)
POTASSIUM SERPL-SCNC: 4.6 MMOL/L (ref 3.6–5.5)
POTASSIUM SERPL-SCNC: 5.1 MMOL/L (ref 3.6–5.5)
PROT SERPL-MCNC: 7.2 G/DL (ref 6–8.2)
RBC # BLD AUTO: 3.38 M/UL (ref 4.2–5.4)
RBC # BLD AUTO: 3.47 M/UL (ref 4.2–5.4)
RH BLD: NORMAL
SARS-COV+SARS-COV-2 AG RESP QL IA.RAPID: NOTDETECTED
SARS-COV-2 RNA RESP QL NAA+PROBE: NOTDETECTED
SODIUM SERPL-SCNC: 123 MMOL/L (ref 135–145)
SODIUM SERPL-SCNC: 124 MMOL/L (ref 135–145)
SPECIMEN SOURCE: NORMAL
SPECIMEN SOURCE: NORMAL
TROPONIN T SERPL-MCNC: 615 NG/L (ref 6–19)
TROPONIN T SERPL-MCNC: 617 NG/L (ref 6–19)
WBC # BLD AUTO: 7.4 K/UL (ref 4.8–10.8)
WBC # BLD AUTO: 7.8 K/UL (ref 4.8–10.8)

## 2021-04-18 PROCEDURE — 93005 ELECTROCARDIOGRAM TRACING: CPT | Performed by: STUDENT IN AN ORGANIZED HEALTH CARE EDUCATION/TRAINING PROGRAM

## 2021-04-18 PROCEDURE — A9270 NON-COVERED ITEM OR SERVICE: HCPCS | Performed by: EMERGENCY MEDICINE

## 2021-04-18 PROCEDURE — 80048 BASIC METABOLIC PNL TOTAL CA: CPT

## 2021-04-18 PROCEDURE — 96375 TX/PRO/DX INJ NEW DRUG ADDON: CPT

## 2021-04-18 PROCEDURE — 770020 HCHG ROOM/CARE - TELE (206)

## 2021-04-18 PROCEDURE — 84484 ASSAY OF TROPONIN QUANT: CPT

## 2021-04-18 PROCEDURE — 87426 SARSCOV CORONAVIRUS AG IA: CPT

## 2021-04-18 PROCEDURE — 700111 HCHG RX REV CODE 636 W/ 250 OVERRIDE (IP): Performed by: INTERNAL MEDICINE

## 2021-04-18 PROCEDURE — 700101 HCHG RX REV CODE 250: Performed by: INTERNAL MEDICINE

## 2021-04-18 PROCEDURE — A9270 NON-COVERED ITEM OR SERVICE: HCPCS | Performed by: STUDENT IN AN ORGANIZED HEALTH CARE EDUCATION/TRAINING PROGRAM

## 2021-04-18 PROCEDURE — 99255 IP/OBS CONSLTJ NEW/EST HI 80: CPT | Performed by: INTERNAL MEDICINE

## 2021-04-18 PROCEDURE — 700111 HCHG RX REV CODE 636 W/ 250 OVERRIDE (IP): Performed by: STUDENT IN AN ORGANIZED HEALTH CARE EDUCATION/TRAINING PROGRAM

## 2021-04-18 PROCEDURE — A9270 NON-COVERED ITEM OR SERVICE: HCPCS | Performed by: INTERNAL MEDICINE

## 2021-04-18 PROCEDURE — 700102 HCHG RX REV CODE 250 W/ 637 OVERRIDE(OP): Performed by: INTERNAL MEDICINE

## 2021-04-18 PROCEDURE — 700102 HCHG RX REV CODE 250 W/ 637 OVERRIDE(OP): Performed by: STUDENT IN AN ORGANIZED HEALTH CARE EDUCATION/TRAINING PROGRAM

## 2021-04-18 PROCEDURE — 700111 HCHG RX REV CODE 636 W/ 250 OVERRIDE (IP): Performed by: EMERGENCY MEDICINE

## 2021-04-18 PROCEDURE — 99223 1ST HOSP IP/OBS HIGH 75: CPT | Performed by: INTERNAL MEDICINE

## 2021-04-18 PROCEDURE — U0005 INFEC AGEN DETEC AMPLI PROBE: HCPCS

## 2021-04-18 PROCEDURE — 93010 ELECTROCARDIOGRAM REPORT: CPT | Performed by: INTERNAL MEDICINE

## 2021-04-18 PROCEDURE — 90935 HEMODIALYSIS ONE EVALUATION: CPT

## 2021-04-18 PROCEDURE — 700102 HCHG RX REV CODE 250 W/ 637 OVERRIDE(OP): Performed by: EMERGENCY MEDICINE

## 2021-04-18 PROCEDURE — U0003 INFECTIOUS AGENT DETECTION BY NUCLEIC ACID (DNA OR RNA); SEVERE ACUTE RESPIRATORY SYNDROME CORONAVIRUS 2 (SARS-COV-2) (CORONAVIRUS DISEASE [COVID-19]), AMPLIFIED PROBE TECHNIQUE, MAKING USE OF HIGH THROUGHPUT TECHNOLOGIES AS DESCRIBED BY CMS-2020-01-R: HCPCS

## 2021-04-18 PROCEDURE — 5A1D70Z PERFORMANCE OF URINARY FILTRATION, INTERMITTENT, LESS THAN 6 HOURS PER DAY: ICD-10-PCS | Performed by: INTERNAL MEDICINE

## 2021-04-18 PROCEDURE — 85027 COMPLETE CBC AUTOMATED: CPT

## 2021-04-18 RX ORDER — BISACODYL 10 MG
10 SUPPOSITORY, RECTAL RECTAL
Status: DISCONTINUED | OUTPATIENT
Start: 2021-04-18 | End: 2021-04-19 | Stop reason: HOSPADM

## 2021-04-18 RX ORDER — PROMETHAZINE HYDROCHLORIDE 25 MG/1
12.5-25 SUPPOSITORY RECTAL EVERY 4 HOURS PRN
Status: DISCONTINUED | OUTPATIENT
Start: 2021-04-18 | End: 2021-04-19 | Stop reason: HOSPADM

## 2021-04-18 RX ORDER — HYDRALAZINE HYDROCHLORIDE 20 MG/ML
20 INJECTION INTRAMUSCULAR; INTRAVENOUS EVERY 6 HOURS PRN
Status: DISCONTINUED | OUTPATIENT
Start: 2021-04-18 | End: 2021-04-19 | Stop reason: HOSPADM

## 2021-04-18 RX ORDER — NITROGLYCERIN 20 MG/100ML
0-200 INJECTION INTRAVENOUS CONTINUOUS
Status: DISCONTINUED | OUTPATIENT
Start: 2021-04-18 | End: 2021-04-18

## 2021-04-18 RX ORDER — FUROSEMIDE 10 MG/ML
100 INJECTION INTRAMUSCULAR; INTRAVENOUS ONCE
Status: DISCONTINUED | OUTPATIENT
Start: 2021-04-18 | End: 2021-04-18

## 2021-04-18 RX ORDER — LORAZEPAM 2 MG/ML
0.5 INJECTION INTRAMUSCULAR ONCE
Status: COMPLETED | OUTPATIENT
Start: 2021-04-18 | End: 2021-04-18

## 2021-04-18 RX ORDER — AMOXICILLIN 250 MG
2 CAPSULE ORAL 2 TIMES DAILY
Status: DISCONTINUED | OUTPATIENT
Start: 2021-04-18 | End: 2021-04-19 | Stop reason: HOSPADM

## 2021-04-18 RX ORDER — AMLODIPINE BESYLATE 5 MG/1
10 TABLET ORAL EVERY EVENING
Status: DISCONTINUED | OUTPATIENT
Start: 2021-04-18 | End: 2021-04-19 | Stop reason: HOSPADM

## 2021-04-18 RX ORDER — ONDANSETRON 2 MG/ML
4 INJECTION INTRAMUSCULAR; INTRAVENOUS EVERY 4 HOURS PRN
Status: DISCONTINUED | OUTPATIENT
Start: 2021-04-18 | End: 2021-04-19 | Stop reason: HOSPADM

## 2021-04-18 RX ORDER — LOSARTAN POTASSIUM 25 MG/1
25 TABLET ORAL DAILY
Status: DISCONTINUED | OUTPATIENT
Start: 2021-04-18 | End: 2021-04-19 | Stop reason: HOSPADM

## 2021-04-18 RX ORDER — HYDROXYCHLOROQUINE SULFATE 200 MG/1
200 TABLET, FILM COATED ORAL EVERY EVENING
Status: DISCONTINUED | OUTPATIENT
Start: 2021-04-18 | End: 2021-04-19 | Stop reason: HOSPADM

## 2021-04-18 RX ORDER — NITROGLYCERIN 80 MG/1
1 PATCH TRANSDERMAL DAILY
Status: DISCONTINUED | OUTPATIENT
Start: 2021-04-18 | End: 2021-04-19 | Stop reason: HOSPADM

## 2021-04-18 RX ORDER — ACETAMINOPHEN 325 MG/1
650 TABLET ORAL EVERY 6 HOURS PRN
Status: DISCONTINUED | OUTPATIENT
Start: 2021-04-18 | End: 2021-04-19 | Stop reason: HOSPADM

## 2021-04-18 RX ORDER — PROCHLORPERAZINE EDISYLATE 5 MG/ML
5-10 INJECTION INTRAMUSCULAR; INTRAVENOUS EVERY 4 HOURS PRN
Status: DISCONTINUED | OUTPATIENT
Start: 2021-04-18 | End: 2021-04-19 | Stop reason: HOSPADM

## 2021-04-18 RX ORDER — LORAZEPAM 2 MG/ML
0.5 INJECTION INTRAMUSCULAR EVERY 4 HOURS PRN
Status: DISCONTINUED | OUTPATIENT
Start: 2021-04-18 | End: 2021-04-19 | Stop reason: HOSPADM

## 2021-04-18 RX ORDER — ENALAPRILAT 1.25 MG/ML
1.25 INJECTION INTRAVENOUS EVERY 6 HOURS PRN
Status: DISCONTINUED | OUTPATIENT
Start: 2021-04-18 | End: 2021-04-19 | Stop reason: HOSPADM

## 2021-04-18 RX ORDER — PROMETHAZINE HYDROCHLORIDE 25 MG/1
12.5-25 TABLET ORAL EVERY 4 HOURS PRN
Status: DISCONTINUED | OUTPATIENT
Start: 2021-04-18 | End: 2021-04-19 | Stop reason: HOSPADM

## 2021-04-18 RX ORDER — POLYETHYLENE GLYCOL 3350 17 G/17G
1 POWDER, FOR SOLUTION ORAL
Status: DISCONTINUED | OUTPATIENT
Start: 2021-04-18 | End: 2021-04-19 | Stop reason: HOSPADM

## 2021-04-18 RX ORDER — CLONIDINE HYDROCHLORIDE 0.1 MG/1
0.1 TABLET ORAL EVERY 6 HOURS PRN
Status: DISCONTINUED | OUTPATIENT
Start: 2021-04-18 | End: 2021-04-19 | Stop reason: HOSPADM

## 2021-04-18 RX ORDER — FAMOTIDINE 20 MG/1
20 TABLET, FILM COATED ORAL ONCE
Status: COMPLETED | OUTPATIENT
Start: 2021-04-18 | End: 2021-04-18

## 2021-04-18 RX ORDER — ONDANSETRON 4 MG/1
4 TABLET, ORALLY DISINTEGRATING ORAL EVERY 4 HOURS PRN
Status: DISCONTINUED | OUTPATIENT
Start: 2021-04-18 | End: 2021-04-19 | Stop reason: HOSPADM

## 2021-04-18 RX ORDER — LABETALOL HYDROCHLORIDE 5 MG/ML
10 INJECTION, SOLUTION INTRAVENOUS EVERY 4 HOURS PRN
Status: DISCONTINUED | OUTPATIENT
Start: 2021-04-18 | End: 2021-04-19 | Stop reason: HOSPADM

## 2021-04-18 RX ORDER — FUROSEMIDE 40 MG/1
160 TABLET ORAL 2 TIMES DAILY
Status: DISCONTINUED | OUTPATIENT
Start: 2021-04-18 | End: 2021-04-19 | Stop reason: HOSPADM

## 2021-04-18 RX ORDER — INDOMETHACIN 25 MG/1
25 CAPSULE ORAL
Status: DISCONTINUED | OUTPATIENT
Start: 2021-04-18 | End: 2021-04-18

## 2021-04-18 RX ORDER — COLCHICINE 0.6 MG/1
0.6 TABLET ORAL 2 TIMES DAILY
Status: DISCONTINUED | OUTPATIENT
Start: 2021-04-18 | End: 2021-04-19 | Stop reason: HOSPADM

## 2021-04-18 RX ORDER — HEPARIN SODIUM 1000 [USP'U]/ML
1500 INJECTION, SOLUTION INTRAVENOUS; SUBCUTANEOUS
Status: DISCONTINUED | OUTPATIENT
Start: 2021-04-18 | End: 2021-04-19 | Stop reason: HOSPADM

## 2021-04-18 RX ORDER — PREDNISONE 10 MG/1
5 TABLET ORAL EVERY EVENING
Status: DISCONTINUED | OUTPATIENT
Start: 2021-04-18 | End: 2021-04-19 | Stop reason: HOSPADM

## 2021-04-18 RX ORDER — MYCOPHENOLATE MOFETIL 250 MG/1
250 CAPSULE ORAL EVERY EVENING
Status: DISCONTINUED | OUTPATIENT
Start: 2021-04-18 | End: 2021-04-19 | Stop reason: HOSPADM

## 2021-04-18 RX ORDER — SUCRALFATE ORAL 1 G/10ML
1 SUSPENSION ORAL
Status: DISCONTINUED | OUTPATIENT
Start: 2021-04-18 | End: 2021-04-19 | Stop reason: HOSPADM

## 2021-04-18 RX ORDER — OMEPRAZOLE 20 MG/1
20 CAPSULE, DELAYED RELEASE ORAL 2 TIMES DAILY
Status: DISCONTINUED | OUTPATIENT
Start: 2021-04-18 | End: 2021-04-19 | Stop reason: HOSPADM

## 2021-04-18 RX ORDER — HYDROMORPHONE HYDROCHLORIDE 1 MG/ML
1 INJECTION, SOLUTION INTRAMUSCULAR; INTRAVENOUS; SUBCUTANEOUS
Status: DISCONTINUED | OUTPATIENT
Start: 2021-04-18 | End: 2021-04-19 | Stop reason: HOSPADM

## 2021-04-18 RX ORDER — CARVEDILOL 6.25 MG/1
25 TABLET ORAL 2 TIMES DAILY WITH MEALS
Status: DISCONTINUED | OUTPATIENT
Start: 2021-04-18 | End: 2021-04-19 | Stop reason: HOSPADM

## 2021-04-18 RX ADMIN — HYDROMORPHONE HYDROCHLORIDE 1 MG: 1 INJECTION, SOLUTION INTRAMUSCULAR; INTRAVENOUS; SUBCUTANEOUS at 21:08

## 2021-04-18 RX ADMIN — HYDROMORPHONE HYDROCHLORIDE 1 MG: 1 INJECTION, SOLUTION INTRAMUSCULAR; INTRAVENOUS; SUBCUTANEOUS at 12:51

## 2021-04-18 RX ADMIN — SUCRALFATE 1 G: 1 SUSPENSION ORAL at 21:08

## 2021-04-18 RX ADMIN — AMLODIPINE BESYLATE 10 MG: 5 TABLET ORAL at 18:24

## 2021-04-18 RX ADMIN — ONDANSETRON 4 MG: 2 INJECTION INTRAMUSCULAR; INTRAVENOUS at 08:31

## 2021-04-18 RX ADMIN — LORAZEPAM 0.5 MG: 2 INJECTION INTRAMUSCULAR; INTRAVENOUS at 07:51

## 2021-04-18 RX ADMIN — LORAZEPAM 0.5 MG: 2 INJECTION INTRAMUSCULAR; INTRAVENOUS at 00:38

## 2021-04-18 RX ADMIN — FUROSEMIDE 160 MG: 40 TABLET ORAL at 05:24

## 2021-04-18 RX ADMIN — LORAZEPAM 0.5 MG: 2 INJECTION INTRAMUSCULAR; INTRAVENOUS at 03:12

## 2021-04-18 RX ADMIN — OMEPRAZOLE 20 MG: 20 CAPSULE, DELAYED RELEASE ORAL at 18:23

## 2021-04-18 RX ADMIN — LORAZEPAM 0.5 MG: 2 INJECTION INTRAMUSCULAR; INTRAVENOUS at 15:06

## 2021-04-18 RX ADMIN — OMEPRAZOLE 20 MG: 20 CAPSULE, DELAYED RELEASE ORAL at 05:24

## 2021-04-18 RX ADMIN — ONDANSETRON 4 MG: 2 INJECTION INTRAMUSCULAR; INTRAVENOUS at 12:24

## 2021-04-18 RX ADMIN — CARVEDILOL 25 MG: 6.25 TABLET, FILM COATED ORAL at 18:24

## 2021-04-18 RX ADMIN — ONDANSETRON 4 MG: 2 INJECTION INTRAMUSCULAR; INTRAVENOUS at 22:30

## 2021-04-18 RX ADMIN — PREDNISONE 5 MG: 10 TABLET ORAL at 18:25

## 2021-04-18 RX ADMIN — ONDANSETRON 4 MG: 2 INJECTION INTRAMUSCULAR; INTRAVENOUS at 17:12

## 2021-04-18 RX ADMIN — FAMOTIDINE 20 MG: 20 TABLET, FILM COATED ORAL at 00:07

## 2021-04-18 RX ADMIN — CARVEDILOL 25 MG: 6.25 TABLET, FILM COATED ORAL at 08:22

## 2021-04-18 RX ADMIN — HEPARIN SODIUM 1500 UNITS: 1000 INJECTION, SOLUTION INTRAVENOUS; SUBCUTANEOUS at 17:38

## 2021-04-18 RX ADMIN — COLCHICINE 0.6 MG: 0.6 TABLET, FILM COATED ORAL at 09:41

## 2021-04-18 RX ADMIN — LABETALOL HYDROCHLORIDE 10 MG: 5 INJECTION, SOLUTION INTRAVENOUS at 02:06

## 2021-04-18 RX ADMIN — PROMETHAZINE HYDROCHLORIDE 25 MG: 25 TABLET ORAL at 21:08

## 2021-04-18 RX ADMIN — MYCOPHENOLATE MOFETIL 250 MG: 250 CAPSULE ORAL at 18:25

## 2021-04-18 RX ADMIN — HYDROXYCHLOROQUINE SULFATE 200 MG: 200 TABLET, FILM COATED ORAL at 18:37

## 2021-04-18 RX ADMIN — COLCHICINE 0.6 MG: 0.6 TABLET, FILM COATED ORAL at 18:38

## 2021-04-18 RX ADMIN — HYDROMORPHONE HYDROCHLORIDE 1 MG: 1 INJECTION, SOLUTION INTRAMUSCULAR; INTRAVENOUS; SUBCUTANEOUS at 09:35

## 2021-04-18 RX ADMIN — LOSARTAN POTASSIUM 25 MG: 25 TABLET, FILM COATED ORAL at 05:24

## 2021-04-18 RX ADMIN — HYDRALAZINE HYDROCHLORIDE 20 MG: 20 INJECTION INTRAMUSCULAR; INTRAVENOUS at 09:12

## 2021-04-18 RX ADMIN — FUROSEMIDE 160 MG: 40 TABLET ORAL at 18:00

## 2021-04-18 ASSESSMENT — ENCOUNTER SYMPTOMS
MUSCULOSKELETAL NEGATIVE: 1
ABDOMINAL PAIN: 0
DIZZINESS: 0
NEUROLOGICAL NEGATIVE: 1
TINGLING: 0
SPUTUM PRODUCTION: 0
FEVER: 1
FALLS: 0
LOSS OF CONSCIOUSNESS: 0
CHILLS: 0
EYES NEGATIVE: 1
DEPRESSION: 0
DIARRHEA: 0
SHORTNESS OF BREATH: 1
MYALGIAS: 0
VOMITING: 0
PALPITATIONS: 0
HEADACHES: 0
FEVER: 0
PALPITATIONS: 1
NAUSEA: 0
CONSTIPATION: 0
WEAKNESS: 0
PSYCHIATRIC NEGATIVE: 1
HEARTBURN: 1
STRIDOR: 0
COUGH: 0

## 2021-04-18 ASSESSMENT — PAIN DESCRIPTION - PAIN TYPE
TYPE: ACUTE PAIN

## 2021-04-18 ASSESSMENT — LIFESTYLE VARIABLES
HAVE YOU EVER FELT YOU SHOULD CUT DOWN ON YOUR DRINKING: NO
AVERAGE NUMBER OF DAYS PER WEEK YOU HAVE A DRINK CONTAINING ALCOHOL: 0
EVER FELT BAD OR GUILTY ABOUT YOUR DRINKING: NO
ON A TYPICAL DAY WHEN YOU DRINK ALCOHOL HOW MANY DRINKS DO YOU HAVE: 0
ALCOHOL_USE: NO
TOTAL SCORE: 0
HOW MANY TIMES IN THE PAST YEAR HAVE YOU HAD 5 OR MORE DRINKS IN A DAY: 0
EVER HAD A DRINK FIRST THING IN THE MORNING TO STEADY YOUR NERVES TO GET RID OF A HANGOVER: NO
CONSUMPTION TOTAL: NEGATIVE
HAVE PEOPLE ANNOYED YOU BY CRITICIZING YOUR DRINKING: NO
TOTAL SCORE: 0
TOTAL SCORE: 0

## 2021-04-18 ASSESSMENT — COPD QUESTIONNAIRES
HAVE YOU SMOKED AT LEAST 100 CIGARETTES IN YOUR ENTIRE LIFE: NO/DON'T KNOW
DURING THE PAST 4 WEEKS HOW MUCH DID YOU FEEL SHORT OF BREATH: MOST  OR ALL OF THE TIME
DO YOU EVER COUGH UP ANY MUCUS OR PHLEGM?: YES, A FEW DAYS A WEEK OR MONTH
COPD SCREENING SCORE: 4

## 2021-04-18 ASSESSMENT — COGNITIVE AND FUNCTIONAL STATUS - GENERAL
DAILY ACTIVITIY SCORE: 24
SUGGESTED CMS G CODE MODIFIER DAILY ACTIVITY: CH
MOBILITY SCORE: 23
CLIMB 3 TO 5 STEPS WITH RAILING: A LITTLE
SUGGESTED CMS G CODE MODIFIER MOBILITY: CI

## 2021-04-18 ASSESSMENT — PATIENT HEALTH QUESTIONNAIRE - PHQ9
SUM OF ALL RESPONSES TO PHQ9 QUESTIONS 1 AND 2: 0
SUM OF ALL RESPONSES TO PHQ9 QUESTIONS 1 AND 2: 0
2. FEELING DOWN, DEPRESSED, IRRITABLE, OR HOPELESS: NOT AT ALL
1. LITTLE INTEREST OR PLEASURE IN DOING THINGS: NOT AT ALL
1. LITTLE INTEREST OR PLEASURE IN DOING THINGS: NOT AT ALL
2. FEELING DOWN, DEPRESSED, IRRITABLE, OR HOPELESS: NOT AT ALL

## 2021-04-18 NOTE — ED NOTES
Patient resting in bed. No acute distress.    No complaints at this time.    Fall and safety precautions maintained.    Hourly rounding in progress.    Physician aware of pt vital signs.

## 2021-04-18 NOTE — PROGRESS NOTES
O2 saturation while at rest is 96% on 4L however while sleeping, needing 8L to remain above 90% oxygen saturation. RT updated.

## 2021-04-18 NOTE — ED PROVIDER NOTES
ED Provider Note    CHIEF COMPLAINT  Chief Complaint   Patient presents with   • Shortness of Breath   • Abdominal Pain       [facility]  Lily URIBE Martín Nicole is a 25 y.o. female with a history of lupus and lupus nephritis with ESRD on MWF dialysis, last dialysis Friday who presents with a chief complaint of shortness of breath.  Patient also has chronically low hemoglobin and gets routine blood transfusions.  She got 2 units of packed red blood cells today and went home but began to feel very short of breath.  Lying down makes her shortness of breath worse.  She is typically on 4 L of oxygen at baseline but had to increase the oxygen to 8 L/min which prompted her to come to the ER.  She denies any chest pain but does endorse some heartburn, nausea, and vomiting which is chronic for her.  No fevers, cough or cold symptoms.    REVIEW OF SYSTEMS  See HPI for further details.  Shortness of breath.  Abdominal pain.  Nausea.  Heartburn.  All other systems are negative.     PAST MEDICAL HISTORY   has a past medical history of Anemia (01/17/2018), AVF (arteriovenous fistula) ([facility]), Dialysis patient ([facility]), ESRD (end stage renal disease) on dialysis ([facility]) (01/17/2018), Heart burn, Hypertension (01/17/2018), Indigestion, Lupus ([facility]), Migraines (01/17/2018), and Seizure ([facility]) (2013).    SOCIAL HISTORY  Social History     Tobacco Use   • Smoking status: Never Smoker   • Smokeless tobacco: Never Used   Substance and Sexual Activity   • Alcohol use: No   • Drug use: No   • Sexual activity: Not on file       SURGICAL HISTORY   has a past surgical history that includes ronak by laparoscopy (4/5/2010); av fistula creation (Right); angioplasty (01/17/2018); other; other; gastroscopy-endo (12/9/2019); gastroscopy (N/A, 5/30/2020); gastroscopy-endo (9/18/2020); upper gi endoscopy,ctrl bleed (11/12/2020); upper gi endoscopy,biopsy (11/12/2020); gastroscopy-endo (11/12/2020); colonoscopy,diagnostic (1/8/2021); upper gi endoscopy,diagnosis  "(1/8/2021); upper gi endoscopy,ctrl bleed (1/8/2021); upper gi endoscopy,diagnosis (3/5/2021); upper gi endoscopy,diagnosis (N/A, 3/19/2021); and upper gi endoscopy,ctrl bleed (3/19/2021).    CURRENT MEDICATIONS  Home Medications    **Home medications have not yet been reviewed for this encounter**         ALLERGIES  Allergies   Allergen Reactions   • Cephalexin Rash     .   • Clindamycin Rash     .   • Methylprednisolone Unspecified     Anxious   • Metoprolol Rash     .   • Norco [Hydrocodone-Acetaminophen] Nausea       PHYSICAL EXAM  VITAL SIGNS: BP (!) 190/133   Pulse (!) 122   Temp 36.4 °C (97.5 °F) (Temporal)   Resp (!) 30   Ht 1.702 m (5' 7\")   Wt 49.9 kg (110 lb)   LMP 03/31/2021 (Approximate)   SpO2 97%   BMI 17.23 kg/m²    Pulse ox interpretation: I interpret this pulse ox as normal.  Constitutional: Alert in no apparent distress.  HENT: No signs of trauma, Bilateral external ears normal, Nose normal.  Tacky mucous membranes.  Eyes: Pupils are equal and reactive, Conjunctiva normal, Non-icteric.   Neck: Normal range of motion, No tenderness, Supple, No stridor.   Lymphatic: No lymphadenopathy noted.   Cardiovascular: Tachycardic with regular rhythm, no murmurs. Pulses symmetrical.  Thorax & Lungs: Normal breath sounds, No respiratory distress, No wheezing, No chest tenderness.   Abdomen: Bowel sounds normal, Soft, mild but diffuse tenderness, No masses, No pulsatile masses. No peritoneal signs.  Skin: Warm, Dry, No erythema, No rash.   Back: Normal alignment.  Extremities: Intact distal pulses, No edema, No tenderness, No cyanosis.  Musculoskeletal: Fistula in right upper extremity.  Good range of motion in all major joints. No tenderness to palpation or major deformities noted.   Neurologic: Alert, Normal motor function, Normal sensory function, No focal deficits noted.   Psychiatric: Affect normal, Judgment normal, Mood normal.     DIAGNOSTIC STUDIES / PROCEDURES    LABS  Results for orders placed " or performed during the hospital encounter of 04/17/21   CBC WITH DIFFERENTIAL   Result Value Ref Range    WBC 7.8 4.8 - 10.8 K/uL    RBC 3.47 (L) 4.20 - 5.40 M/uL    Hemoglobin 9.1 (L) 12.0 - 16.0 g/dL    Hematocrit 29.3 (L) 37.0 - 47.0 %    MCV 84.4 81.4 - 97.8 fL    MCH 26.2 (L) 27.0 - 33.0 pg    MCHC 31.1 (L) 33.6 - 35.0 g/dL    RDW 55.5 (H) 35.9 - 50.0 fL    Platelet Count 190 164 - 446 K/uL    MPV 10.2 9.0 - 12.9 fL    Neutrophils-Polys 81.50 (H) 44.00 - 72.00 %    Lymphocytes 7.90 (L) 22.00 - 41.00 %    Monocytes 8.20 0.00 - 13.40 %    Eosinophils 0.80 0.00 - 6.90 %    Basophils 0.60 0.00 - 1.80 %    Immature Granulocytes 1.00 (H) 0.00 - 0.90 %    Nucleated RBC 0.00 /100 WBC    Neutrophils (Absolute) 6.33 2.00 - 7.15 K/uL    Lymphs (Absolute) 0.61 (L) 1.00 - 4.80 K/uL    Monos (Absolute) 0.64 0.00 - 0.85 K/uL    Eos (Absolute) 0.06 0.00 - 0.51 K/uL    Baso (Absolute) 0.05 0.00 - 0.12 K/uL    Immature Granulocytes (abs) 0.08 0.00 - 0.11 K/uL    NRBC (Absolute) 0.00 K/uL   COMP METABOLIC PANEL   Result Value Ref Range    Sodium 123 (L) 135 - 145 mmol/L    Potassium 4.6 3.6 - 5.5 mmol/L    Chloride 85 (L) 96 - 112 mmol/L    Co2 29 20 - 33 mmol/L    Anion Gap 9.0 7.0 - 16.0    Glucose 93 65 - 99 mg/dL    Bun 33 (H) 8 - 22 mg/dL    Creatinine 4.04 (H) 0.50 - 1.40 mg/dL    Calcium 9.0 8.4 - 10.2 mg/dL    AST(SGOT) 13 12 - 45 U/L    ALT(SGPT) 5 2 - 50 U/L    Alkaline Phosphatase 70 30 - 99 U/L    Total Bilirubin 0.7 0.1 - 1.5 mg/dL    Albumin 3.0 (L) 3.2 - 4.9 g/dL    Total Protein 7.2 6.0 - 8.2 g/dL    Globulin 4.2 (H) 1.9 - 3.5 g/dL    A-G Ratio 0.7 g/dL   LACTIC ACID   Result Value Ref Range    Lactic Acid 1.5 0.5 - 2.0 mmol/L   TROPONIN   Result Value Ref Range    Troponin T 615 (H) 6 - 19 ng/L   COD - Adult (Type and Screen)   Result Value Ref Range    ABO Grouping Only O     Rh Grouping Only POS    proBrain Natriuretic Peptide, NT   Result Value Ref Range    NT-proBNP >40054 (H) 0 - 125 pg/mL   ESTIMATED GFR    Result Value Ref Range    GFR If  17 (A) >60 mL/min/1.73 m 2    GFR If Non  14 (A) >60 mL/min/1.73 m 2   EKG   Result Value Ref Range    Report       [facility] Emergency Dept.    Test Date:  2021  Pt Name:    CASE WOODSON            Department: EDSM  MRN:        [id number]                      [facility]  Gender:     Female                       Technician: [id number]  :        [id number]                   Requested By:SHANNON CORRAL  Order #:    [id number]                    Reading MD: Shannon Corral MD    Measurements  Intervals                                Axis  Rate:       121                          P:          42  CT:         139                          QRS:        -9  QRSD:       106                          T:          94  QT:         338  QTc:        480    Interpretive Statements  Sinus tachycardia  LAE, consider biatrial enlargement  LVH with secondary repolarization abnormality  Borderline prolonged QT interval  Compared to ECG 2021 07:45:02  Early repolarization now present  Electronically Signed On 2021 0:52:20 PDT by Shannon Corrla MD       RADIOLOGY  CXR    COURSE & MEDICAL DECISION MAKING  Pertinent Labs & Imaging studies reviewed. (See chart for details)  Patient is well-known to this facility.  She has a history of lupus with ESRD on dialysis.  She was most recently admitted to this facility 2021 for hematemesis and significant pancytopenia.  She had a hemoglobin of 6.7 and platelet count of 27,000.  GI and nephrology were consulted.  GI recommended a gastric emptying study which was unable to be completed due to patient's persistent nausea and hematemesis.  GI deferred repeat EGD since she had one performed last month and they did not feel this was going to change the underlying primary diagnosis which was felt to be due to poorly controlled lupus.  Her symptoms slowly improved and she was supported with  transfusions of packed red blood cells and platelets.  She was started on prednisone.  Her primary rheumatologist, Dr. Wong, was unable to be contacted despite multiple phone calls to his practice.  They recommended consideration of follow-up to a tertiary Medical Center preferably [facility] or [facility].    This is a 25-year-old chronically ill female who is here with what sounds like fluid overload after receiving 2 units packed red blood cells yesterday for chronic anemia secondary to ESRD.  Arrives tachycardic, hypertensive, tachypneic, and hypoxic on her home oxygen of 4 L.  No new cough or fevers to suggest infectious etiology.  No chest pain although she is having some heartburn.  EKG does not suggest ischemia.  Chest x-ray to my read reveals fluid overload.  Patient was given a dose of Lasix as she still makes urine.    CBC is without leukocytosis.  She does have an anemia but it is around her baseline.  Metabolic panel does reveal hyponatremia to 123, hypochloremia to 85, and renal function consistent with her known diagnosis of ESRD.  Troponin is elevated to 6.5 but this is improved from prior, 10 days ago it was 933.  She is not having active chest pain or ischemic changes on her EKG so I will defer any management of ACS at this time as I think it is unlikely to be playing a role in today's presentation.  Elevated troponin likely secondary to end-stage renal disease as well as increased oxygen requirement.  BNP is greater than 35,000 consistent with clinical picture of fluid overload.    Patient will require hospitalization for additional work-up and management and likely dialysis tomorrow.  She was discussed with the hospitalist and admitted in guarded condition.    FINAL IMPRESSION  1.  Fluid overload  2.  Anemia  3.  Hyponatremia  4.  Elevated troponin  5.  ESRD    Electronically signed by: Christopher Hussein M.D., 4/17/2021 11:29 PM

## 2021-04-18 NOTE — H&P
Hospital Medicine History & Physical Note    Date of Service  4/18/2021    Primary Care Physician  Ella Matthew M.D.    Consultants  None however she will need nephrology    Code Status  Full Code    Chief Complaint  Chief Complaint   Patient presents with   • Shortness of Breath       History of Presenting Illness  25 y.o. female who presented 4/17/2021 with shortness of breath.  Patient does use oxygen chronically, has a history of lupus and end-stage renal disease, she also has profound anemia, frequently needing transfusions.  She did have labs this week and was noted to have a decreased hemoglobin requiring transfusion.  She was sent to the infusion center today.  She states this is not uncommon, generally she only gets 1 unit and tolerates this well.  Today however they decided to give her 2 units, no extra Lasix was given.  She gets hemodialysis, Monday Wednesday and Friday, did get dialysis on Friday with no issues.  She states a couple of hours after the transfusion completed she became short of breath and this continued to worsen.  Because of this, she presented to the emergency department.  She is on oxygen at baseline, 3 L but had significant hypoxia on the dose upon arrival.  I did discuss the case including labs and imaging with the ER physician.    Review of Systems  Review of Systems   Constitutional: Negative for chills, fever and malaise/fatigue.   HENT: Negative for congestion.    Respiratory: Positive for shortness of breath. Negative for cough, sputum production and stridor.    Cardiovascular: Negative for chest pain, palpitations and leg swelling.   Gastrointestinal: Negative for abdominal pain, constipation, diarrhea, nausea and vomiting.   Genitourinary: Negative for dysuria and urgency.   Musculoskeletal: Negative for falls and myalgias.   Neurological: Negative for dizziness, tingling, loss of consciousness, weakness and headaches.   Psychiatric/Behavioral: Negative for depression and  suicidal ideas.   All other systems reviewed and are negative.      Past Medical History   has a past medical history of Anemia (01/17/2018), AVF (arteriovenous fistula) ([facility]), Dialysis patient ([facility]), ESRD (end stage renal disease) on dialysis ([facility]) (01/17/2018), Heart burn, Hypertension (01/17/2018), Indigestion, Lupus ([facility]), Migraines (01/17/2018), and Seizure ([facility]) (2013).    Surgical History   has a past surgical history that includes ronak by laparoscopy (4/5/2010); av fistula creation (Right); angioplasty (01/17/2018); other; other; gastroscopy-endo (12/9/2019); gastroscopy (N/A, 5/30/2020); gastroscopy-endo (9/18/2020); pr upper gi endoscopy,ctrl bleed (11/12/2020); pr upper gi endoscopy,biopsy (11/12/2020); gastroscopy-endo (11/12/2020); pr colonoscopy,diagnostic (1/8/2021); pr upper gi endoscopy,diagnosis (1/8/2021); pr upper gi endoscopy,ctrl bleed (1/8/2021); pr upper gi endoscopy,diagnosis (3/5/2021); pr upper gi endoscopy,diagnosis (N/A, 3/19/2021); and pr upper gi endoscopy,ctrl bleed (3/19/2021).     Family History  family history includes Diabetes in her paternal grandmother.     Social History   reports that she has never smoked. She has never used smokeless tobacco. She reports that she does not drink alcohol and does not use drugs.    Allergies  Allergies   Allergen Reactions   • Cephalexin Rash     .   • Clindamycin Rash     .   • Methylprednisolone Unspecified     Anxious   • Metoprolol Rash     .   • Norco [Hydrocodone-Acetaminophen] Nausea       Medications  Prior to Admission Medications   Prescriptions Last Dose Informant Patient Reported? Taking?   amLODIPine (NORVASC) 10 MG Tab  Patient No No   Sig: Take 1 tablet by mouth every evening.   carvedilol (COREG) 25 MG Tab   No No   Sig: Take 1 tablet by mouth 2 times a day with meals.   furosemide (LASIX) 80 MG Tab  Patient No No   Sig: Take 2 Tablets by mouth 2 Times a Day for 30 days.   hydroxychloroquine (PLAQUENIL) 200 MG Tab  Patient Yes  No   Sig: Take 200 mg by mouth every evening.   losartan (COZAAR) 25 MG Tab   No No   Sig: Take 1 tablet by mouth every day.   mycophenolate (MYFORTIC) 180 MG EC tablet  Patient No No   Sig: Take 1 Tab by mouth every evening.   ondansetron (ZOFRAN ODT) 4 MG TABLET DISPERSIBLE   No No   Sig: Take 1 tablet by mouth every four hours as needed for Nausea (give PO if no IV route available).   pantoprazole (PROTONIX) 40 MG Tablet Delayed Response  Patient No No   Sig: Take 1 tablet by mouth 2 times a day for 30 days.   predniSONE (DELTASONE) 5 MG Tab  Patient Yes No   Sig: Take 5 mg by mouth every evening.   sucralfate (CARAFATE) 1 GM/10ML Suspension  Patient No No   Sig: Take 10 mL by mouth 4 Times a Day,Before Meals and at Bedtime.      Facility-Administered Medications: None       Physical Exam  Temp:  [36.4 °C (97.5 °F)] 36.4 °C (97.5 °F)  Pulse:  [109-122] 113  Resp:  [27-39] 34  BP: (178-206)/(133-146) 201/135  SpO2:  [90 %-98 %] 90 %    Physical Exam  Vitals and nursing note reviewed.   Constitutional:       General: She is not in acute distress.     Appearance: She is well-developed. She is ill-appearing. She is not diaphoretic.   HENT:      Head: Normocephalic and atraumatic.      Right Ear: External ear normal.      Left Ear: External ear normal.      Nose: Nose normal. No congestion or rhinorrhea.      Mouth/Throat:      Mouth: Mucous membranes are moist.      Pharynx: No oropharyngeal exudate.   Eyes:      General:         Right eye: No discharge.         Left eye: No discharge.      Extraocular Movements: Extraocular movements intact.   Neck:      Trachea: No tracheal deviation.   Cardiovascular:      Rate and Rhythm: Normal rate and regular rhythm.      Heart sounds: No murmur. No friction rub. No gallop.    Pulmonary:      Effort: Pulmonary effort is normal. No respiratory distress.      Breath sounds: No stridor. Rales present. No wheezing.   Chest:      Chest wall: No tenderness.   Abdominal:       General: Bowel sounds are normal. There is no distension.      Palpations: Abdomen is soft.      Tenderness: There is no abdominal tenderness.   Musculoskeletal:         General: No tenderness. Normal range of motion.      Cervical back: Normal range of motion and neck supple.      Right lower leg: Edema present.      Left lower leg: Edema present.   Lymphadenopathy:      Cervical: No cervical adenopathy.   Skin:     General: Skin is warm and dry.      Coloration: Skin is not jaundiced.      Findings: No erythema or rash.   Neurological:      General: No focal deficit present.      Mental Status: She is alert and oriented to person, place, and time.      Cranial Nerves: No cranial nerve deficit.   Psychiatric:         Mood and Affect: Mood normal.         Behavior: Behavior normal.         Thought Content: Thought content normal.         Judgment: Judgment normal.         Laboratory:  Recent Labs     04/15/21  0909 04/17/21  2345   WBC 3.0* 7.8   RBC 2.40* 3.47*   HEMOGLOBIN 6.4* 9.1*   HEMATOCRIT 22.0* 29.3*   MCV 91.7 84.4   MCH 26.7* 26.2*   MCHC 29.1* 31.1*   RDW 57.9* 55.5*   PLATELETCT 107* 190   MPV 12.5 10.2     Recent Labs     04/17/21  2345   SODIUM 123*   POTASSIUM 4.6   CHLORIDE 85*   CO2 29   GLUCOSE 93   BUN 33*   CREATININE 4.04*   CALCIUM 9.0     Recent Labs     04/17/21  2345   ALTSGPT 5   ASTSGOT 13   ALKPHOSPHAT 70   TBILIRUBIN 0.7   GLUCOSE 93         Recent Labs     04/17/21  2345   NTPROBNP >74035*         Recent Labs     04/17/21  2345   TROPONINT 615*       Imaging:  DX-CHEST-PORTABLE (1 VIEW)   Final Result         1.  Pulmonary edema and/or infiltrates are identified, which appear somewhat increased since the prior exam.   2.  Cardiomegaly            Assessment/Plan:  I anticipate this patient will require at least two midnights for appropriate medical management, necessitating inpatient admission.    * Acute on chronic respiratory failure with hypoxia (HCC)- (present on  admission)  Assessment & Plan  -Due to worsening pulmonary edema and hypertensive urgency from fluid overload post 2 units blood transfusion  -Patient has received 60 mg of IV Lasix in the ER, will not give additional IV Lasix, will continue with home Lasix  -Ultimately, she just needs hemodialysis but this is not needed urgently, she is only requiring 2 L more oxygen than her baseline    Hypertensive urgency- (present on admission)  Assessment & Plan  -Continue home amlodipine, Coreg and losartan  -Start a nitro patch  -Start as needed clonidine, enalapril, hydralazine and labetalol  -Adjust as needed    Pulmonary edema- (present on admission)  Assessment & Plan  -Noted on chest x-ray, due to transfusion  -Patient has received IV Lasix in the ER, will need hemodialysis on Sunday    Lupus (HCC)- (present on admission)  Assessment & Plan  -Chronic, continue immunosuppression    ESRD (end stage renal disease) on dialysis ([facility])- (present on admission)  Assessment & Plan  -Patient has been compliant with dialysis, last had dialysis on Friday which is her scheduled day  -Unfortunately, she did receive 2 units of blood on Saturday, 2 days away from her next dialysis appointment  -She is now requiring more oxygen due to fluid overload which is also causing uncontrolled hypertension  -She does not urgently need hemodialysis however she will need it on Sunday as opposed to Monday, will likely need it 2 days in a row  -We will need nephrology consultation in the morning    Elevated brain natriuretic peptide (BNP) level- (present on admission)  Assessment & Plan  -Patient chronically is greater than 35,000    Elevated troponin- (present on admission)  Assessment & Plan  -Patient does have chronic elevation, it is improved from previous, unlikely any cardiac issue however I will continue to trend  -Monitor on telemetry

## 2021-04-18 NOTE — PROGRESS NOTES
Telemetry Shift Summary     Rhythm SR -ST  HR Range 95 - 116  Ectopy R PVC  Measurements 0.20 / 0.08 / 0.36           Normal Values  Rhythm SR  HR Range    Measurements 0.12-0.20 / 0.06-0.10  / 0.30-0.52

## 2021-04-18 NOTE — PROGRESS NOTES
Med rec updated and complete on 4/7/2021  Allergies reviewed  Asked pt last time she took her medications and if there was any changes since the last time we talked about her medications on 4/7/2021  Pt reports no antibiotics in the last 2 weeks, that she had to take at home.    Current Facility-Administered Medications on File Prior to Encounter   Medication Dose Route Frequency Provider Last Rate Last Admin   • [COMPLETED] acetaminophen (Tylenol) tablet 650 mg  650 mg Oral Once Austyn Garcia III, M.D.   650 mg at 04/17/21 1002   • [COMPLETED] diphenhydrAMINE (BENADRYL) injection 25 mg  25 mg Intravenous PRN Austyn Garcia III, M.D.   25 mg at 04/17/21 1002   • [DISCONTINUED] diphenhydrAMINE (BENADRYL) tablet/capsule 25 mg  25 mg Oral Once Austyn Garcia III, M.D.         Current Outpatient Medications on File Prior to Encounter   Medication Sig Dispense Refill   • ondansetron (ZOFRAN ODT) 4 MG TABLET DISPERSIBLE Take 1 tablet by mouth every four hours as needed for Nausea (give PO if no IV route available). 30 tablet 0   • carvedilol (COREG) 25 MG Tab Take 1 tablet by mouth 2 times a day with meals. 60 tablet 0   • losartan (COZAAR) 25 MG Tab Take 1 tablet by mouth every day. 30 tablet 0   • furosemide (LASIX) 80 MG Tab Take 2 Tablets by mouth 2 Times a Day for 30 days. 120 tablet 0   • sucralfate (CARAFATE) 1 GM/10ML Suspension Take 10 mL by mouth 4 Times a Day,Before Meals and at Bedtime. (Patient taking differently: Take 1 g by mouth 2 times a day.) 1200 mL 1   • pantoprazole (PROTONIX) 40 MG Tablet Delayed Response Take 1 tablet by mouth 2 times a day for 30 days. 60 tablet 0   • amLODIPine (NORVASC) 10 MG Tab Take 1 tablet by mouth every evening. 90 tablet 3   • predniSONE (DELTASONE) 5 MG Tab Take 5 mg by mouth every evening.     • mycophenolate (MYFORTIC) 180 MG EC tablet Take 1 Tab by mouth every evening. 30 Tab 1   • hydroxychloroquine (PLAQUENIL) 200 MG Tab Take 200 mg by mouth every evening.

## 2021-04-18 NOTE — ED NOTES
Gerson from Lab called with critical result of troponin at 615. Critical lab result read back to Gerson.   Dr. Hussein notified of critical lab result at 0041.  Critical lab result read back by Dr. Hussein.

## 2021-04-18 NOTE — PROGRESS NOTES
Received patient from the Emergency Department. Patient ambulated from the [facility] to the hospital bed with stand by assist. Patient is alert and oriented X 4. Safety precautions in place. Will continue to monitor patient.

## 2021-04-18 NOTE — ASSESSMENT & PLAN NOTE
-Patient does have chronic elevation, it is improved from previous, unlikely any cardiac issue however I will continue to trend  -Monitor on telemetry

## 2021-04-18 NOTE — ASSESSMENT & PLAN NOTE
-Continue home amlodipine, Coreg and losartan  -Start a nitro patch  -Start as needed clonidine, enalapril, hydralazine and labetalol  -Adjust as needed

## 2021-04-18 NOTE — PROGRESS NOTES
Notified MD of new report of chest pressure and SOB unrelieved by increased 02. Stat EKG and troponin ordered. EKG image sent via voalte to MD. Pt requesting pain medication for chest and back pain. VSS. MD notified. Per MD, will be at bedside in 20 minutes. Pt medicated for anxiety. See MAR.

## 2021-04-18 NOTE — FLOWSHEET NOTE
04/18/21 0200   Patient History   Pulmonary Diagnosis SOB with Hypoxia   Procedures Relevant to Respiratory Status None   Home O2 Yes   Oxygen liter flow 4   Oxygen at night only No   Nocturnal CPAP No   Home Treatments/Frequency No   Sleep Apnea Screening   Have you had a sleep study? No   Have you been diagnosed with sleep apnea? No   S - Have you been told that you SNORE? 1   T - Are you often TIRED during the day? 1   O - Do you know if you stop breathing or has anyone witnessed you stop breathing while you were asleep? (OBSTRUCTION) 1   P - Do you have high blood PRESSURE or on medication to control high blood pressure? 1   B - Is your Body Mass Index greater than 35? (BMI) 0   A - Are you 50 years old or older? (AGE) 0   N - Are you a male with a NECK circumference greater than 17 inches, or a female with a neck circumference greater than 16 inches? 0   G - Are you male? (GENDER) 0   Stop Bang Total Score 4   COPD Risk Screening   Do you have a history of COPD? No   COPD Population Screener   During the past 4 weeks, how much did you feel short of breath? 2   Do you ever cough up any mucus or phlegm? 1   In the past 12 months, you do less than you used to because of your breathing problems 1   Have you smoked at least 100 cigarettes in your entire life? 0   How old are you? 0   COPD Screening Score 4   COPD Coordinator Recommended Yes   Protocol Pathways   Protocol Pathways None

## 2021-04-18 NOTE — ASSESSMENT & PLAN NOTE
-Noted on chest x-ray, due to transfusion  -Patient has received IV Lasix in the ER, will need hemodialysis on Sunday

## 2021-04-18 NOTE — ASSESSMENT & PLAN NOTE
-Due to worsening pulmonary edema and hypertensive urgency from fluid overload post 2 units blood transfusion  -Patient has received 60 mg of IV Lasix in the ER, will not give additional IV Lasix, will continue with home Lasix  -Ultimately, she just needs hemodialysis but this is not needed urgently, she is only requiring 2 L more oxygen than her baseline

## 2021-04-18 NOTE — ASSESSMENT & PLAN NOTE
-Patient has been compliant with dialysis, last had dialysis on Friday which is her scheduled day  -Unfortunately, she did receive 2 units of blood on Saturday, 2 days away from her next dialysis appointment  -She is now requiring more oxygen due to fluid overload which is also causing uncontrolled hypertension  -She does not urgently need hemodialysis however she will need it on Sunday as opposed to Monday, will likely need it 2 days in a row  -We will need nephrology consultation in the morning

## 2021-04-18 NOTE — ED TRIAGE NOTES
Chief Complaint   Patient presents with   • Shortness of Breath   • Abdominal Pain     Patient presents for SOB post blood transfusion around 1500 today. Pt is on M/W/F, dialysis. Pt has also had cough this afternoon and is requiring increased oxygen demand over baseline.

## 2021-04-18 NOTE — PROGRESS NOTES
Hospital Medicine Daily Progress Note    Date of Service  4/18/2021    Chief Complaint  25 y.o. female admitted 4/17/2021 with Chest Pain     Hospital Course  25 y.o. female who presented 4/17/2021 with shortness of breath.  Patient does use oxygen chronically, has a history of lupus and end-stage renal disease, she also has profound anemia, frequently needing transfusions.  She did have labs this week and was noted to have a decreased hemoglobin requiring transfusion.  She was sent to the infusion center today.  She states this is not uncommon, generally she only gets 1 unit and tolerates this well.  Today however they decided to give her 2 units, no extra Lasix was given.  She gets hemodialysis, Monday Wednesday and Friday, did get dialysis on Friday with no issues.  She states a couple of hours after the transfusion completed she became short of breath and this continued to worsen.  Because of this, she presented to the emergency department.  She is on oxygen at baseline, 3 L but had significant hypoxia on the dose upon arrival.        Interval Problem Update  Patient examined at bedside  In no acute distress  No overnight complaints  Patient is having chest pain resembling friction rub as she was a history of lupus  Will start her on dilaudid 1mg Q4H as this has helped her with her pain in the past  Will also start her on Colchicine in lieu of possible acute pericarditis   Trop Chronically Elevated: Trop 617  Obtain ESR/CRP  Start Colchicine 0.5mg BID  Indomethacin 25mg TID       Consultants/Specialty  None    Code Status  Full Code    Disposition  TBD    Review of Systems  Review of Systems   Constitutional: Positive for fever and malaise/fatigue.   HENT: Negative.    Eyes: Negative.    Respiratory: Positive for shortness of breath.    Cardiovascular: Positive for chest pain and palpitations.   Gastrointestinal: Positive for heartburn.   Genitourinary: Negative.    Musculoskeletal: Negative.    Neurological:  Negative.    Endo/Heme/Allergies: Negative.    Psychiatric/Behavioral: Negative.         Physical Exam  Temp:  [36.4 °C (97.5 °F)-36.8 °C (98.3 °F)] 36.8 °C (98.2 °F)  Pulse:  [105-134] 134  Resp:  [18-39] 20  BP: (165-206)/() 194/132  SpO2:  [88 %-100 %] 99 %    Physical Exam  Vitals reviewed.   HENT:      Head: Normocephalic and atraumatic.      Right Ear: External ear normal.      Left Ear: External ear normal.      Nose: Nose normal.      Mouth/Throat:      Mouth: Mucous membranes are moist.   Eyes:      Pupils: Pupils are equal, round, and reactive to light.   Cardiovascular:      Rate and Rhythm: Normal rate and regular rhythm.      Pulses: Normal pulses.      Heart sounds: Normal heart sounds.   Pulmonary:      Effort: Pulmonary effort is normal.      Breath sounds: Normal breath sounds.   Abdominal:      General: Abdomen is flat.   Musculoskeletal:         General: Normal range of motion.      Cervical back: Neck supple.   Skin:     General: Skin is warm.      Capillary Refill: Capillary refill takes less than 2 seconds.   Neurological:      General: No focal deficit present.      Mental Status: She is alert.   Psychiatric:         Mood and Affect: Mood normal.         Fluids  No intake or output data in the 24 hours ending 04/18/21 0906    Laboratory  Recent Labs     04/15/21  0909 04/17/21  2345 04/18/21  0745   WBC 3.0* 7.8 7.4   RBC 2.40* 3.47* 3.38*   HEMOGLOBIN 6.4* 9.1* 8.9*   HEMATOCRIT 22.0* 29.3* 29.0*   MCV 91.7 84.4 85.8   MCH 26.7* 26.2* 26.3*   MCHC 29.1* 31.1* 30.7*   RDW 57.9* 55.5* 55.6*   PLATELETCT 107* 190 173   MPV 12.5 10.2 9.9     Recent Labs     04/17/21  2345 04/18/21  0745   SODIUM 123* 124*   POTASSIUM 4.6 5.1   CHLORIDE 85* 86*   CO2 29 29   GLUCOSE 93 75   BUN 33* 38*   CREATININE 4.04* 4.47*   CALCIUM 9.0 9.2                   Imaging  DX-CHEST-PORTABLE (1 VIEW)   Final Result         1.  Pulmonary edema and/or infiltrates are identified, which appear somewhat increased  since the prior exam.   2.  Cardiomegaly           Assessment/Plan  * Acute on chronic respiratory failure with hypoxia (HCC)- (present on admission)  Assessment & Plan  -Due to worsening pulmonary edema and hypertensive urgency from fluid overload post 2 units blood transfusion  -Patient has received 60 mg of IV Lasix in the ER, will not give additional IV Lasix, will continue with home Lasix  -Ultimately, she just needs hemodialysis but this is not needed urgently, she is only requiring 2 L more oxygen than her baseline    Hypertensive urgency- (present on admission)  Assessment & Plan  -Continue home amlodipine, Coreg and losartan  -Start a nitro patch  -Start as needed clonidine, enalapril, hydralazine and labetalol  -Adjust as needed    Pulmonary edema- (present on admission)  Assessment & Plan  -Noted on chest x-ray, due to transfusion  -Patient has received IV Lasix in the ER, will need hemodialysis on Sunday    Lupus (HCC)- (present on admission)  Assessment & Plan  -Chronic, continue immunosuppression    ESRD (end stage renal disease) on dialysis (HCC)- (present on admission)  Assessment & Plan  -Patient has been compliant with dialysis, last had dialysis on Friday which is her scheduled day  -Unfortunately, she did receive 2 units of blood on Saturday, 2 days away from her next dialysis appointment  -She is now requiring more oxygen due to fluid overload which is also causing uncontrolled hypertension  -She does not urgently need hemodialysis however she will need it on Sunday as opposed to Monday, will likely need it 2 days in a row  -We will need nephrology consultation in the morning    Elevated brain natriuretic peptide (BNP) level- (present on admission)  Assessment & Plan  -Patient chronically is greater than 35,000    Elevated troponin- (present on admission)  Assessment & Plan  -Patient does have chronic elevation, it is improved from previous, unlikely any cardiac issue however I will continue to  trend  -Monitor on telemetry         VTE prophylaxis: Lovenox

## 2021-04-19 VITALS
OXYGEN SATURATION: 100 % | DIASTOLIC BLOOD PRESSURE: 57 MMHG | BODY MASS INDEX: 17.27 KG/M2 | SYSTOLIC BLOOD PRESSURE: 109 MMHG | HEART RATE: 116 BPM | TEMPERATURE: 98.5 F | HEIGHT: 67 IN | WEIGHT: 110 LBS | RESPIRATION RATE: 18 BRPM

## 2021-04-19 PROBLEM — R79.89 ELEVATED BRAIN NATRIURETIC PEPTIDE (BNP) LEVEL: Status: RESOLVED | Noted: 2020-01-26 | Resolved: 2021-04-19

## 2021-04-19 PROBLEM — R79.89 ELEVATED TROPONIN: Status: RESOLVED | Noted: 2020-01-26 | Resolved: 2021-04-19

## 2021-04-19 PROBLEM — J96.21 ACUTE ON CHRONIC RESPIRATORY FAILURE WITH HYPOXIA (HCC): Status: RESOLVED | Noted: 2021-03-19 | Resolved: 2021-04-19

## 2021-04-19 PROBLEM — J81.1 PULMONARY EDEMA: Status: RESOLVED | Noted: 2020-12-02 | Resolved: 2021-04-19

## 2021-04-19 PROBLEM — I16.0 HYPERTENSIVE URGENCY: Status: RESOLVED | Noted: 2021-04-18 | Resolved: 2021-04-19

## 2021-04-19 LAB
ALBUMIN SERPL BCP-MCNC: 2.7 G/DL (ref 3.2–4.9)
ALBUMIN/GLOB SERPL: 0.6 G/DL
ALP SERPL-CCNC: 62 U/L (ref 30–99)
ALT SERPL-CCNC: <5 U/L (ref 2–50)
ANION GAP SERPL CALC-SCNC: 12 MMOL/L (ref 7–16)
AST SERPL-CCNC: 11 U/L (ref 12–45)
BASOPHILS # BLD AUTO: 0.8 % (ref 0–1.8)
BASOPHILS # BLD: 0.08 K/UL (ref 0–0.12)
BILIRUB SERPL-MCNC: 0.6 MG/DL (ref 0.1–1.5)
BUN SERPL-MCNC: 23 MG/DL (ref 8–22)
CALCIUM SERPL-MCNC: 9.2 MG/DL (ref 8.4–10.2)
CHLORIDE SERPL-SCNC: 95 MMOL/L (ref 96–112)
CO2 SERPL-SCNC: 26 MMOL/L (ref 20–33)
CREAT SERPL-MCNC: 3.31 MG/DL (ref 0.5–1.4)
EOSINOPHIL # BLD AUTO: 0.01 K/UL (ref 0–0.51)
EOSINOPHIL NFR BLD: 0.1 % (ref 0–6.9)
ERYTHROCYTE [DISTWIDTH] IN BLOOD BY AUTOMATED COUNT: 56.8 FL (ref 35.9–50)
GLOBULIN SER CALC-MCNC: 4.7 G/DL (ref 1.9–3.5)
GLUCOSE SERPL-MCNC: 81 MG/DL (ref 65–99)
HCT VFR BLD AUTO: 27.8 % (ref 37–47)
HGB BLD-MCNC: 8.1 G/DL (ref 12–16)
IMM GRANULOCYTES # BLD AUTO: 0.03 K/UL (ref 0–0.11)
IMM GRANULOCYTES NFR BLD AUTO: 0.3 % (ref 0–0.9)
LYMPHOCYTES # BLD AUTO: 0.55 K/UL (ref 1–4.8)
LYMPHOCYTES NFR BLD: 5.7 % (ref 22–41)
MCH RBC QN AUTO: 25.6 PG (ref 27–33)
MCHC RBC AUTO-ENTMCNC: 29.1 G/DL (ref 33.6–35)
MCV RBC AUTO: 88 FL (ref 81.4–97.8)
MONOCYTES # BLD AUTO: 0.61 K/UL (ref 0–0.85)
MONOCYTES NFR BLD AUTO: 6.3 % (ref 0–13.4)
NEUTROPHILS # BLD AUTO: 8.41 K/UL (ref 2–7.15)
NEUTROPHILS NFR BLD: 86.8 % (ref 44–72)
NRBC # BLD AUTO: 0 K/UL
NRBC BLD-RTO: 0 /100 WBC
PLATELET # BLD AUTO: 151 K/UL (ref 164–446)
PMV BLD AUTO: 9.8 FL (ref 9–12.9)
POTASSIUM SERPL-SCNC: 4.3 MMOL/L (ref 3.6–5.5)
PROT SERPL-MCNC: 7.4 G/DL (ref 6–8.2)
RBC # BLD AUTO: 3.16 M/UL (ref 4.2–5.4)
SODIUM SERPL-SCNC: 133 MMOL/L (ref 135–145)
WBC # BLD AUTO: 9.7 K/UL (ref 4.8–10.8)

## 2021-04-19 PROCEDURE — 99239 HOSP IP/OBS DSCHRG MGMT >30: CPT | Performed by: STUDENT IN AN ORGANIZED HEALTH CARE EDUCATION/TRAINING PROGRAM

## 2021-04-19 PROCEDURE — 85025 COMPLETE CBC W/AUTO DIFF WBC: CPT

## 2021-04-19 PROCEDURE — 99233 SBSQ HOSP IP/OBS HIGH 50: CPT | Performed by: INTERNAL MEDICINE

## 2021-04-19 PROCEDURE — 80053 COMPREHEN METABOLIC PANEL: CPT

## 2021-04-19 PROCEDURE — 700111 HCHG RX REV CODE 636 W/ 250 OVERRIDE (IP): Performed by: INTERNAL MEDICINE

## 2021-04-19 PROCEDURE — A9270 NON-COVERED ITEM OR SERVICE: HCPCS | Performed by: INTERNAL MEDICINE

## 2021-04-19 PROCEDURE — 700111 HCHG RX REV CODE 636 W/ 250 OVERRIDE (IP): Performed by: STUDENT IN AN ORGANIZED HEALTH CARE EDUCATION/TRAINING PROGRAM

## 2021-04-19 PROCEDURE — 90935 HEMODIALYSIS ONE EVALUATION: CPT

## 2021-04-19 PROCEDURE — 700102 HCHG RX REV CODE 250 W/ 637 OVERRIDE(OP): Performed by: INTERNAL MEDICINE

## 2021-04-19 PROCEDURE — 700105 HCHG RX REV CODE 258: Performed by: STUDENT IN AN ORGANIZED HEALTH CARE EDUCATION/TRAINING PROGRAM

## 2021-04-19 RX ADMIN — LORAZEPAM 0.5 MG: 2 INJECTION INTRAMUSCULAR; INTRAVENOUS at 07:51

## 2021-04-19 RX ADMIN — LORAZEPAM 0.5 MG: 2 INJECTION INTRAMUSCULAR; INTRAVENOUS at 00:38

## 2021-04-19 RX ADMIN — HYDROMORPHONE HYDROCHLORIDE 1 MG: 1 INJECTION, SOLUTION INTRAMUSCULAR; INTRAVENOUS; SUBCUTANEOUS at 05:20

## 2021-04-19 RX ADMIN — EPOETIN ALFA-EPBX 12000 UNITS: 10000 INJECTION, SOLUTION INTRAVENOUS; SUBCUTANEOUS at 11:52

## 2021-04-19 RX ADMIN — SUCRALFATE 1 G: 1 SUSPENSION ORAL at 10:50

## 2021-04-19 RX ADMIN — HYDROMORPHONE HYDROCHLORIDE 1 MG: 1 INJECTION, SOLUTION INTRAMUSCULAR; INTRAVENOUS; SUBCUTANEOUS at 10:50

## 2021-04-19 RX ADMIN — LORAZEPAM 0.5 MG: 2 INJECTION INTRAMUSCULAR; INTRAVENOUS at 12:17

## 2021-04-19 RX ADMIN — ACETAMINOPHEN 650 MG: 325 TABLET, FILM COATED ORAL at 00:16

## 2021-04-19 RX ADMIN — CEFTRIAXONE SODIUM 1 G: 1 INJECTION, POWDER, FOR SOLUTION INTRAMUSCULAR; INTRAVENOUS at 12:15

## 2021-04-19 ASSESSMENT — ENCOUNTER SYMPTOMS
EYES NEGATIVE: 1
ABDOMINAL PAIN: 0
FEVER: 1
PALPITATIONS: 1
MUSCULOSKELETAL NEGATIVE: 1
NEUROLOGICAL NEGATIVE: 1
PSYCHIATRIC NEGATIVE: 1
HEARTBURN: 1
FEVER: 0
SHORTNESS OF BREATH: 1

## 2021-04-19 NOTE — PROGRESS NOTES
Report received from MICHELLE Valdovinos. Pt resting comfortably in bed, in no apparent distress. States nausea still present and requests medication, RN will medicate per MAR. POC discussed. Pt instructed to notify RN with any needs and agrees.

## 2021-04-19 NOTE — PROGRESS NOTES
Contacted Dr. Ríos to clarify 100 mg IV lasix one time dose ordered. Per Tara, Primary RN. Dr. Richards was contacted to clarify if 160 mg PO lasix ordered should be given to the patient while she is getting dialysis. MD stated he would decrease dose and placed 100 mg IV lasix ordered. Dr. Ríos discontinued 100 mg IV Lasix and stated regularly scheduled 160 mg PO lasix can be given during dialysis as long as SBP is greater than 125. RN to page Dr. Ríos if patient continues to be short of breath or requires more than the 8L oxymask she is currently in after dialysis.

## 2021-04-19 NOTE — DISCHARGE SUMMARY
Discharge Summary    CHIEF COMPLAINT ON ADMISSION  Chief Complaint   Patient presents with   • Shortness of Breath   • Abdominal Pain       Reason for Admission  Shortness of Breath and Abdominal Pain    Admission Date  4/17/2021    CODE STATUS  Full Code    HPI & HOSPITAL COURSE  25 y.o. female who presented 4/17/2021 with shortness of breath.  Patient does use oxygen chronically, has a history of lupus and end-stage renal disease, she also has profound anemia, frequently needing transfusions.  She did have labs this week and was noted to have a decreased hemoglobin requiring transfusion.  She was sent to the infusion center today.  She states this is not uncommon, generally she only gets 1 unit and tolerates this well.  Today however they decided to give her 2 units, no extra Lasix was given.  She gets hemodialysis, Monday Wednesday and Friday, did get dialysis on Friday with no issues.  She states a couple of hours after the transfusion completed she became short of breath and this continued to worsen.  Because of this, she presented to the emergency department.  She is on oxygen at baseline, 3 L but had significant hypoxia on the dose upon arrival. Patient found to have a trop of 617 and diffuse ST segement elevation consistent with acute pericarditis 2/2 to Lupus. 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      Patient received HD yesterday and today with complete resolution of symptoms. Can be discharged with continuation of HD. Will go home on oxygen     Therefore, she is discharged in good and stable condition to home with close outpatient follow-up.    The patient met 2-midnight criteria for an inpatient stay at the time of discharge.    Discharge Date  4/19/21    FOLLOW UP ITEMS POST DISCHARGE  Follow up with Primary Care Physician     DISCHARGE DIAGNOSES  Principal Problem (Resolved):    Acute on chronic respiratory failure with hypoxia (HCC) POA: Yes  Active Problems:    ESRD (end stage  renal disease) on dialysis (HCC) POA: Yes    Lupus (HCC) POA: Yes  Resolved Problems:    Hypertensive urgency POA: Yes    Pulmonary edema POA: Yes    Elevated troponin POA: Yes    Elevated brain natriuretic peptide (BNP) level POA: Yes      FOLLOW UP  Future Appointments   Date Time Provider Department Center   4/29/2021  [facility]/2021  9[facility]30 [facility]     No follow-up provider specified.    MEDICATIONS ON DISCHARGE     Medication List      CHANGE how you take these medications      Instructions   sucralfate 1 GM/10ML Susp  What changed: when to take this  Commonly known as: CARAFATE   Doctor's comments: Can give tablets at same dosage if pt unable to afford liquid preparation, quantity sufficient for one month  Take 10 mL by mouth 4 Times a Day,Before Meals and at Bedtime.  Dose: 1 g        CONTINUE taking these medications      Instructions   amLODIPine 10 MG Tabs  Commonly known as: NORVASC   Take 1 tablet by mouth every evening.  Dose: 10 mg     carvedilol 25 MG Tabs  Commonly known as: COREG   Take 1 tablet by mouth 2 times a day with meals.  Dose: 25 mg     furosemide 80 MG Tabs  Commonly known as: Lasix   Take 2 Tablets by mouth 2 Times a Day for 30 days.  Dose: 160 mg     hydroxychloroquine 200 MG Tabs  Commonly known as: Plaquenil   Take 200 mg by mouth every evening.  Dose: 200 mg     losartan 25 MG Tabs  Commonly known as: COZAAR   Take 1 tablet by mouth every day.  Dose: 25 mg     mycophenolate 180 MG EC tablet  Commonly known as: Myfortic   Take 1 Tab by mouth every evening.  Dose: 180 mg     ondansetron 4 MG Tbdp  Commonly known as: ZOFRAN ODT   Take 1 tablet by mouth every four hours as needed for Nausea (give PO if no IV route available).  Dose: 4 mg     pantoprazole 40 MG Tbec  Commonly known as: PROTONIX   Take 1 tablet by mouth 2 times a day for 30 days.  Dose: 40 mg     predniSONE 5 MG Tabs  Commonly known as: DELTASONE   Take  5 mg by mouth every evening.  Dose: 5 mg            Allergies  Allergies   Allergen Reactions   • Cephalexin Rash     .   • Clindamycin Rash     .   • Methylprednisolone Unspecified     Anxious   • Metoprolol Rash     .   • Norco [Hydrocodone-Acetaminophen] Nausea       DIET  Orders Placed This Encounter   Procedures   • Diet Order Diet: Regular     Standing Status:   Standing     Number of Occurrences:   1     Order Specific Question:   Diet:     Answer:   Regular [1]       ACTIVITY  As tolerated.  Weight bearing as tolerated    CONSULTATIONS  None    PROCEDURES  None    LABORATORY  Lab Results   Component Value Date    SODIUM 133 (L) 04/19/2021    POTASSIUM 4.3 04/19/2021    CHLORIDE 95 (L) 04/19/2021    CO2 26 04/19/2021    GLUCOSE 81 04/19/2021    BUN 23 (H) 04/19/2021    CREATININE 3.31 (H) 04/19/2021        Lab Results   Component Value Date    WBC 9.7 04/19/2021    HEMOGLOBIN 8.1 (L) 04/19/2021    HEMATOCRIT 27.8 (L) 04/19/2021    PLATELETCT 151 (L) 04/19/2021        Total time of the discharge process exceeds 35 minutes.

## 2021-04-19 NOTE — PROGRESS NOTES
[facility] Services Progress Note     HD treatment ordered by Dr Metz x 3 hours.  Treatment Start time: 1117          End time: 1417       Net UF 2600 ml    Patient tolerated treatment well. VS stable all through out, except HR went up to 120's. Pt asymptomatic, lowered UFR.     All blood was returned. RUE AVF needle removed. Dry gauze dressing applied and changed without bleeding issue. + Bruit/Thrill pre-post Treatment. See paper flow sheet for details.     Report given to MICHELLE Morrow.

## 2021-04-19 NOTE — FACE TO FACE
"Face to Face Note  -  Durable Medical Equipment    Rusty Richards M.D. - NPI: [id number]  I certify that this patient is under my care and that they had a durable medical equipment(DME)face to face encounter by myself that meets the physician DME face-to-face encounter requirements with this patient on:    Date of encounter:   Patient:                    MRN:                       YOB: 2019  Lily Nicole  [id number]  1995     The encounter with the patient was in whole, or in part, for the following medical condition, which is the primary reason for durable medical equipment:  COPD    I certify that, based on my findings, the following durable medical equipment is medically necessary:  Oxygen.    HOME O2 Saturation Measurements:(Values must be present for Home Oxygen orders)  Room air sat at rest: 84  Room air sat with amb: 83  With liters of O2: 4, O2 sat at rest with O2: 95  With Liters of O2: 4, O2 sat with amb with O2 : 92  Is the patient mobile?: Yes    My Clinical findings support the need for the above equipment due to:  Hypoxia    Supporting Symptoms: The patient requires supplemental oxygen, as the following interventions have been tried with limited or no improvement: \"Ambulation with oximetry    If patient feels more short of breath, they can go up to 6 liters per minute and contact healthcare provider.  "

## 2021-04-19 NOTE — PROGRESS NOTES
Nephrology Daily Progress Note    Date of Service  4/19/2021    Chief Complaint  25 y.o. female with SLE, ESRD on HD MWF admitted 4/17/2021 with shortness of breath after blood transfusion.    Interval Problem Update  4/19 -patient had dialysis yesterday with 3 L removed, says her shortness of breath improved, but she is still somewhat short of breath today.  Denies chest pain.    Review of Systems  Review of Systems   Constitutional: Negative for fever.   Respiratory: Positive for shortness of breath.    Cardiovascular: Negative for chest pain.   Gastrointestinal: Negative for abdominal pain.   All other systems reviewed and are negative.       Physical Exam  Temp:  [36.2 °C (97.2 °F)-38.7 °C (101.7 °F)] 37.7 °C (99.9 °F)  Pulse:  [116-133] 116  Resp:  [20-32] 20  BP: (134-189)/() 134/91  SpO2:  [92 %-98 %] 98 %    Physical Exam   Constitutional: She is oriented to person, place, and time. She appears well-developed. No distress.   HENT:   Mouth/Throat: Oropharynx is clear and moist. No oropharyngeal exudate.   Eyes: EOM are normal. No scleral icterus.   Neck: No tracheal deviation present.   Cardiovascular: Normal heart sounds.   No murmur heard.  Pulmonary/Chest: Effort normal. No stridor. She has rales.   Abdominal: Soft. There is no abdominal tenderness.   Musculoskeletal:         General: No edema. Normal range of motion.   Neurological: She is alert and oriented to person, place, and time.   Skin: Skin is warm and dry.   Psychiatric: She has a normal mood and affect.   Access: Right brachiocephalic AV fistula, patent bruit and thrill    Fluids    Intake/Output Summary (Last 24 hours) at 4/19/2021 0916  Last data filed at 4/18/2021 2115  Gross per 24 hour   Intake 1220 ml   Output 3500 ml   Net -2280 ml       Laboratory  Labs reviewed, pertinent labs below.  Recent Labs     04/17/21  2345 04/18/21  0745 04/19/21  0432   WBC 7.8 7.4 9.7   RBC 3.47* 3.38* 3.16*   HEMOGLOBIN 9.1* 8.9* 8.1*   HEMATOCRIT  29.3* 29.0* 27.8*   MCV 84.4 85.8 88.0   MCH 26.2* 26.3* 25.6*   MCHC 31.1* 30.7* 29.1*   RDW 55.5* 55.6* 56.8*   PLATELETCT 190 173 151*   MPV 10.2 9.9 9.8     Recent Labs     04/17/21  2345 04/18/21  0745 04/19/21  0432   SODIUM 123* 124* 133*   POTASSIUM 4.6 5.1 4.3   CHLORIDE 85* 86* 95*   CO2 29 29 26   GLUCOSE 93 75 81   BUN 33* 38* 23*   CREATININE 4.04* 4.47* 3.31*   CALCIUM 9.0 9.2 9.2               URINALYSIS:  Lab Results   Component Value Date/Time    COLORURINE Yellow 02/15/2021 1108    CLARITY Clear 02/15/2021 1108    SPECGRAVITY 1.015 02/15/2021 1108    PHURINE 8.5 (A) 02/15/2021 1108    KETONES Negative 02/15/2021 1108    PROTEINURIN 30 (A) 02/15/2021 1108    BILIRUBINUR Negative 02/15/2021 1108    UROBILU 0.2 07/16/2020 0900    NITRITE Negative 02/15/2021 1108    LEUKESTERAS Negative 02/15/2021 1108    OCCULTBLOOD Trace (A) 02/15/2021 1108     UPC  Lab Results   Component Value Date/Time    TOTPROTUR 45.0 (H) 07/16/2020 0900      Lab Results   Component Value Date/Time    CREATININEU 18.93 07/16/2020 0900         Imaging interpreted by radiologist. Imaging reports reviewed with pertinent findings below  DX-CHEST-PORTABLE (1 VIEW)   Final Result         1.  Pulmonary edema and/or infiltrates are identified, which appear somewhat increased since the prior exam.   2.  Cardiomegaly            Current Facility-Administered Medications   Medication Dose Route Frequency Provider Last Rate Last Admin   • epoetin (Retacrit) injection (Dialysis use only) 12,000 Units  12,000 Units Intravenous MO, WE + FR Navin Metz M.D.       • amLODIPine (NORVASC) tablet 10 mg  10 mg Oral Q EVENING Jorge Styles D.O.   10 mg at 04/18/21 1824   • carvedilol (COREG) tablet 25 mg  25 mg Oral BID WITH MEALS ZULEIKA HeartOJg   25 mg at 04/18/21 1824   • furosemide (LASIX) tablet 160 mg  160 mg Oral BID ZULEIKA HeartOJg   160 mg at 04/18/21 1800   • hydroxychloroquine (Plaquenil) tablet 200 mg  200 mg Oral Q EVENING  ZULEIKA HeartO.   200 mg at 04/18/21 1837   • losartan (COZAAR) tablet 25 mg  25 mg Oral DAILY ZULEIKA HeartO.   25 mg at 04/18/21 0524   • mycophenolate (CELLCEPT) capsule 250 mg  250 mg Oral Q EVENING ROGE Heart.O.   250 mg at 04/18/21 1825   • omeprazole (PRILOSEC) capsule 20 mg  20 mg Oral BID ZULEIKA HeartO.   20 mg at 04/18/21 1823   • predniSONE (DELTASONE) tablet 5 mg  5 mg Oral Q EVENING ZULEIKA HeartO.   5 mg at 04/18/21 1825   • sucralfate (CARAFATE) 1 GM/10ML suspension 1 g  1 g Oral 4X/DAY ACHS ROGE Heart.O.   1 g at 04/18/21 2108   • senna-docusate (PERICOLACE or SENOKOT S) 8.6-50 MG per tablet 2 tablet  2 tablet Oral BID Jorge Styles D.O.        And   • polyethylene glycol/lytes (MIRALAX) PACKET 1 Packet  1 Packet Oral QDAY PRN Jorge Styles D.O.        And   • bisacodyl (DULCOLAX) suppository 10 mg  10 mg Rectal QDAY PRN Jorge Styles D.O.       • Respiratory Therapy Consult   Nebulization Continuous RT Jorge Styles D.O.       • acetaminophen (Tylenol) tablet 650 mg  650 mg Oral Q6HRS PRN ZULEIKA HeartO.   650 mg at 04/19/21 0016   • labetalol (NORMODYNE/TRANDATE) injection 10 mg  10 mg Intravenous Q4HRS PRN ROGE Heart.O.   10 mg at 04/18/21 0206   • enalaprilat (VASOTEC) injection 1.25 mg  1.25 mg Intravenous Q6HRS PRN Jorge Styles D.O.       • cloNIDine (CATAPRES) tablet 0.1 mg  0.1 mg Oral Q6HRS PRN ZULEIKA HeartO.       • ondansetron (ZOFRAN) syringe/vial injection 4 mg  4 mg Intravenous Q4HRS PRN ZULEIKA HeartO.   4 mg at 04/18/21 2230   • ondansetron (ZOFRAN ODT) dispertab 4 mg  4 mg Oral Q4HRS PRN Jorge Styles D.O.   Stopped at 04/18/21 0820   • promethazine (PHENERGAN) tablet 12.5-25 mg  12.5-25 mg Oral Q4HRS PRN ZULEIKA HeartO.   25 mg at 04/18/21 2108   • promethazine (PHENERGAN) suppository 12.5-25 mg  12.5-25 mg Rectal Q4HRS PRN ROGE Heart.O.       • prochlorperazine (COMPAZINE) injection  5-10 mg  5-10 mg Intravenous Q4HRS PRN Jorge Styles D.O.       • hydrALAZINE (APRESOLINE) injection 20 mg  20 mg Intravenous Q6HRS PRN ZULEIKA HeartO.   20 mg at 04/18/21 0912   • nitroglycerin (NITRODUR) 0.4 MG/HR 1 Patch  1 Patch Transdermal DAILY Jorge Styles D.O.   Stopped at 04/18/21 0524   • LORazepam (ATIVAN) injection 0.5 mg  0.5 mg Intravenous Q4HRS PRN ROGE Heart.O.   0.5 mg at 04/19/21 0751   • colchicine (COLCRYS) tablet 0.6 mg  0.6 mg Oral BID Rusty Richards M.D.   0.6 mg at 04/18/21 1838   • HYDROmorphone (Dilaudid) injection 1 mg  1 mg Intravenous Q3HRS PRN Rusty Richards M.D.   1 mg at 04/19/21 0520   • heparin injection 1,500 Units  1,500 Units Intravenous ACUTE DIALYSIS PRN Fadi Najjar, M.D.   1,500 Units at 04/18/21 1738         Assessment/Plan  25 y.o. female with SLE, ESRD on HD MWF admitted 4/17/2021 with shortness of breath after blood transfusion.    1.  ESRD on hemodialysis Monday Wednesday Friday.  Oliguric.  Plan for dialysis today per usual schedule.  Avoid nephrotoxins.    2.  Access: Right brachiocephalic AV fistula, patent.  Right arm precautions.    3.  Acute hypoxic respiratory failure, likely due to transfusion associated circulatory overload.  Improved with dialysis yesterday.  Plan for ultrafiltration with dialysis today.  Continue Lasix 160 mg p.o. twice daily.    4.  Hypertension, uncontrolled, likely due to occult volume overload.  Plan for ultrafiltration with dialysis as tolerated today.    5.  Anemia of chronic disease, uncontrolled.  Continue Epogen  12,000 units thrice weekly with dialysis.  Check CBC at least 3 times a week.    6.  Thrombocytopenia, worsening, mild.  Unclear etiology.  Check CBC at least 3 times a week.    7.  Hyponatremia, persistent, but improving.  Limit hypotonic fluids.  Check labs daily.    Patient is okay to discharge after dialysis today from a nephrology standpoint.      Navin Metz MD  Nephrology

## 2021-04-19 NOTE — DISCHARGE PLANNING
Anticipated Discharge Disposition:   Home     Action:   Chart review complete.     Discussed patient's plan of care and plans for discharge during rounds. Per MD, patient may discharge home today.     1415: Face to face and order in place for oxygen DME; however, RN CM received a voalte message from bedside RN stating that the patient does not need any extra oxygen for home as the patient has already submitted a request and oxygen is to be delivered when she gets home. MD aware of information and okay with the above. RN CM will continue to follow and assist.     Barriers to Discharge:   None     Plan:   Hospital care management will continue to assist with discharge planning needs.

## 2021-04-19 NOTE — DISCHARGE INSTRUCTIONS
Discharge Instructions    Discharged to home by car with relative. Discharged via wheelchair, hospital escort: Yes.  Special equipment needed: Not Applicable    Be sure to schedule a follow-up appointment with your primary care doctor or any specialists as instructed.     Discharge Plan:   Diet Plan: Discussed  Activity Level: Discussed  Confirmed Follow up Appointment: Patient to Call and Schedule Appointment  Confirmed Symptoms Management: Discussed  Medication Reconciliation Updated: Yes    I understand that a diet low in cholesterol, fat, and sodium is recommended for good health. Unless I have been given specific instructions below for another diet, I accept this instruction as my diet prescription.   Other diet: as tolerated    · Special Instructions:    Chest Pain, Nonspecific  It is often hard to give a specific diagnosis for the cause of chest pain. There is always a chance that your pain could be related to something serious, like a heart attack or a blood clot in the lungs. You need to follow up with your caregiver for further evaluation. More lab tests or other studies such as X-rays, electrocardiography, stress testing, or cardiac imaging may be needed to find the cause of your pain.  Most of the time, nonspecific chest pain improves within 2 to 3 days with rest and mild pain medicine. For the next few days, avoid physical exertion or activities that bring on pain. Do not smoke. Avoid drinking alcohol. Call your caregiver for routine follow-up as advised.   SEEK IMMEDIATE MEDICAL CARE IF:  · You develop increased chest pain or pain that radiates to the arm, neck, jaw, back, or abdomen.   · You develop shortness of breath, increased coughing, or you start coughing up blood.   · You have severe back or abdominal pain, nausea, or vomiting.   · You develop severe weakness, fainting, fever, or chills.   Document Released: 12/18/2006 Document Revised: 03/11/2013 Document Reviewed: 06/06/2008  ExitCare®  Patient Information ©2013 [facility].        · Is patient discharged on Warfarin / Coumadin?   No     Depression / Suicide Risk    As you are discharged from this [facility] facility, it is important to learn how to keep safe from harming yourself.    Recognize the warning signs:  · Abrupt changes in personality, positive or negative- including increase in energy   · Giving away possessions  · Change in eating patterns- significant weight changes-  positive or negative  · Change in sleeping patterns- unable to sleep or sleeping all the time   · Unwillingness or inability to communicate  · Depression  · Unusual sadness, discouragement and loneliness  · Talk of wanting to die  · Neglect of personal appearance   · Rebelliousness- reckless behavior  · Withdrawal from people/activities they love  · Confusion- inability to concentrate     If you or a loved one observes any of these behaviors or has concerns about self-harm, here's what you can do:  · Talk about it- your feelings and reasons for harming yourself  · Remove any means that you might use to hurt yourself (examples: pills, rope, extension cords, firearm)  · Get professional help from the community (Mental Health, Substance Abuse, psychological counseling)  · Do not be alone:Call your Safe Contact- someone whom you trust who will be there for you.  · Call your local CRISIS HOTLINE 923-7746 or 498-477-1697  · Call your local Children's Mobile Crisis Response Team [place] (832) 467-0886 or www.Sharypic  · Call the toll free National Suicide Prevention Hotlines   · National Suicide Prevention Lifeline 562-194-EHCO (8718)  · National Hope Line Network 800-SUICIDE (190-9322)

## 2021-04-19 NOTE — PROGRESS NOTES
Telemetry Shift Summary     Rhythm Stach  HR Range 125-136  Ectopy none noted  Measurements 0.14 / 0.08 / 0.28         Normal Values  Rhythm SR  HR Range    Measurements 0.12-0.20 / 0.06-0.10  / 0.30-0.52

## 2021-04-19 NOTE — PROGRESS NOTES
Pt medicated with dilaudid for chest pressure and back pain. Pt reports relief from medication. MD notified.

## 2021-04-19 NOTE — PROGRESS NOTES
HD RN at bedside. Pt to DC after HD treatment per MD. Pt's with oxygen tank at bedside with enough o2 to get to appt and home. States she has put in a request for additional oxygen supply to be delivered when she gets home.

## 2021-04-19 NOTE — PROGRESS NOTES
Attempted to clarify order of 160 mg lasix PO during dialysis with Dr. Richards. MD to order smaller dose of 100 mg lasix. Order entered as IV route, unable to contact MD to clarify. Escalated to charge MICHELLE Alicea. Per on call hospitalist tone Ríos to give PO lasix 160 mg if SBP >125, IV lasix to be discontinued.

## 2021-04-19 NOTE — PROGRESS NOTES
[facility] Medicine Daily Progress Note    Date of Service  4/19/2021    Chief Complaint  25 y.o. female admitted 4/17/2021 with Chest Pain     Hospital Course  25 y.o. female who presented 4/17/2021 with shortness of breath.  Patient does use oxygen chronically, has a history of lupus and end-stage renal disease, she also has profound anemia, frequently needing transfusions.  She did have labs this week and was noted to have a decreased hemoglobin requiring transfusion.  She was sent to the infusion center today.  She states this is not uncommon, generally she only gets 1 unit and tolerates this well.  Today however they decided to give her 2 units, no extra Lasix was given.  She gets hemodialysis, Monday Wednesday and Friday, did get dialysis on Friday with no issues.  She states a couple of hours after the transfusion completed she became short of breath and this continued to worsen.  Because of this, she presented to the emergency department.  She is on oxygen at baseline, 3 L but had significant hypoxia on the dose upon arrival. Patient found to have a trop of 617 and diffuse ST segement elevation consistent with acute pericarditis 2/2 to Lupus. 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         Interval Problem Update  Patient examined at bedside  In no acute distress  Patient desaturated overnight requiring 8L on Oxymask to achieve 94%  Still seems overloaded and is tachycardic this morning to 119  Will continue dieuresing and monitoring oxygenation  May need ICU admission-Will continue monitoring closely           Consultants/Specialty  None    Code Status  Full Code    Disposition  TBD    Review of Systems  Review of Systems   Constitutional: Positive for fever and malaise/fatigue.   HENT: Negative.    Eyes: Negative.    Respiratory: Positive for shortness of breath.    Cardiovascular: Positive for chest pain and palpitations.   Gastrointestinal: Positive for heartburn.   Genitourinary:  Negative.    Musculoskeletal: Negative.    Neurological: Negative.    Endo/Heme/Allergies: Negative.    Psychiatric/Behavioral: Negative.         Physical Exam  Temp:  [36.2 °C (97.2 °F)-38.7 °C (101.7 °F)] 37.7 °C (99.9 °F)  Pulse:  [116-133] 116  Resp:  [20-32] 20  BP: (134-189)/() 134/91  SpO2:  [92 %-98 %] 98 %    Physical Exam  Vitals reviewed.   HENT:      Head: Normocephalic and atraumatic.      Right Ear: External ear normal.      Left Ear: External ear normal.      Nose: Nose normal.      Mouth/Throat:      Mouth: Mucous membranes are moist.   Eyes:      Pupils: Pupils are equal, round, and reactive to light.   Cardiovascular:      Rate and Rhythm: Normal rate and regular rhythm.      Pulses: Normal pulses.      Heart sounds: Normal heart sounds.   Pulmonary:      Effort: Pulmonary effort is normal.      Breath sounds: Normal breath sounds.   Abdominal:      General: Abdomen is flat.   Musculoskeletal:         General: Normal range of motion.      Cervical back: Neck supple.   Skin:     General: Skin is warm.      Capillary Refill: Capillary refill takes less than 2 seconds.   Neurological:      General: No focal deficit present.      Mental Status: She is alert.   Psychiatric:         Mood and Affect: Mood normal.         Fluids    Intake/Output Summary (Last 24 hours) at 4/19/2021 0856  Last data filed at 4/18/2021 2115  Gross per 24 hour   Intake 1220 ml   Output 3500 ml   Net -2280 ml       Laboratory  Recent Labs     04/17/21  2345 04/18/21  0745 04/19/21  0432   WBC 7.8 7.4 9.7   RBC 3.47* 3.38* 3.16*   HEMOGLOBIN 9.1* 8.9* 8.1*   HEMATOCRIT 29.3* 29.0* 27.8*   MCV 84.4 85.8 88.0   MCH 26.2* 26.3* 25.6*   MCHC 31.1* 30.7* 29.1*   RDW 55.5* 55.6* 56.8*   PLATELETCT 190 173 151*   MPV 10.2 9.9 9.8     Recent Labs     04/17/21  2345 04/18/21  0745 04/19/21  0432   SODIUM 123* 124* 133*   POTASSIUM 4.6 5.1 4.3   CHLORIDE 85* 86* 95*   CO2 29 29 26   GLUCOSE 93 75 81   BUN 33* 38* 23*   CREATININE  4.04* 4.47* 3.31*   CALCIUM 9.0 9.2 9.2                   Imaging  DX-CHEST-PORTABLE (1 VIEW)   Final Result         1.  Pulmonary edema and/or infiltrates are identified, which appear somewhat increased since the prior exam.   2.  Cardiomegaly           Assessment/Plan  * Acute on chronic respiratory failure with hypoxia (HCC)- (present on admission)  Assessment & Plan  -Due to worsening pulmonary edema and hypertensive urgency from fluid overload post 2 units blood transfusion  -Patient has received 60 mg of IV Lasix in the ER, will not give additional IV Lasix, will continue with home Lasix  -Ultimately, she just needs hemodialysis but this is not needed urgently, she is only requiring 2 L more oxygen than her baseline    Hypertensive urgency- (present on admission)  Assessment & Plan  -Continue home amlodipine, Coreg and losartan  -Start a nitro patch  -Start as needed clonidine, enalapril, hydralazine and labetalol  -Adjust as needed    Pulmonary edema- (present on admission)  Assessment & Plan  -Noted on chest x-ray, due to transfusion  -Patient has received IV Lasix in the ER, will need hemodialysis on Sunday    Lupus (HCC)- (present on admission)  Assessment & Plan  -Chronic, continue immunosuppression    ESRD (end stage renal disease) on dialysis (HCC)- (present on admission)  Assessment & Plan  -Patient has been compliant with dialysis, last had dialysis on Friday which is her scheduled day  -Unfortunately, she did receive 2 units of blood on Saturday, 2 days away from her next dialysis appointment  -She is now requiring more oxygen due to fluid overload which is also causing uncontrolled hypertension  -She does not urgently need hemodialysis however she will need it on Sunday as opposed to Monday, will likely need it 2 days in a row  -We will need nephrology consultation in the morning    Elevated brain natriuretic peptide (BNP) level- (present on admission)  Assessment & Plan  -Patient chronically is  greater than 35,000    Elevated troponin- (present on admission)  Assessment & Plan  -Patient does have chronic elevation, it is improved from previous, unlikely any cardiac issue however I will continue to trend  -Monitor on telemetry       VTE prophylaxis: Lovenox

## 2021-04-19 NOTE — PROGRESS NOTES
[facility] Services Progress Note     Hemodialysis treatment x 3 hours performed per Dr. Najjar.  Treatment started at 1742 and ended at 2042.     Patient tolerated treatment well. Patient BP elevated pre, during and post treatment. SBP pre and during first hour of treatment ranging from 170s-180s over DBPs of 100s-120s. Patient denies s/s. SBP slightly better during second hour and until end of treatment with SBPs of 140s-150s, DBPs 80s-100s. Patient baseline tachycardic with increased VA of 120s-130s bpm and with rapid shallow respirations RR 20s-30s/min on 8 lpm O2/oxymask pre, during and post dialysis. See Acute HD flow sheets for details.     Total Net UF Removed:  3,000 mL (see Dialysis I & O Flowsheet)     Post treatment: Cannulation needles removed from RUE AVF access sites, hemostasis established on each insertion site; verified no bleeding. (+) bruit/thrill post treatment. Covered with clean gauze dressings and secured with tape.     Please monitor for AVF access bleeding.  Notify Dialysis and Nephrologist for follow-up.     Report given to primary care nurse SHANNAN Jeffrey RN.

## 2021-04-19 NOTE — CONSULTS
DATE OF SERVICE:  04/18/2021     REQUESTING PHYSICIAN:  Rusty Richards MD.     REASON FOR CONSULTATION:  Management of respiratory failure in the setting of   end-stage renal disease, assessing the need for urgent dialysis.     The patient seen and examined, medical record reviewed.     HISTORY OF PRESENT ILLNESS:  The patient is an unfortunate 25-year-old lady   who is very well known to our service.  She has a history of end-stage renal   disease, undergoes hemodialysis on Monday, Wednesday, Friday schedule, her   last dialysis was Friday, 04/16, patient presented to the hospital with   shortness of breath.  She did receive one unit of blood transfusion yesterday   and today presented to the hospital with shortness of breath and dyspnea on   exertion.  In the hospital, the patient was found to be in respiratory   failure.  We were called to manage respiratory failure in the setting of   end-stage renal disease, assessing the need for urgent dialysis.     The patient has no fever, no chills, no coughing, no hemoptysis.     PAST MEDICAL HISTORY:  Significant for:  1.  End-stage renal disease.  2.  Lupus.  3.  Chronic anemia.     ALLERGIES:  The list was reviewed.     SOCIAL HISTORY:  The patient has no smoking history.     FAMILY HISTORY:  Positive for diabetes.     MEDICATIONS:  Reviewed.     REVIEW OF SYSTEMS:  Again, the patient is complaining of shortness of breath   and fatigue.  All other review of systems negative except outlined in the   history of present illness.     PHYSICAL EXAMINATION:  GENERAL:  The patient appears ill, in moderate respiratory failure.  VITAL SIGNS:  Showed blood pressure of 157/110, heart rate was 124,   respiratory rate was 20.  HEENT:  Normocephalic, atraumatic.  Sclerae are anicteric.  Pupils are   reactive.  Nose normal.  Mucous membranes moist.  NECK:  No lymphadenopathy, no JVD, no thyromegaly.  CHEST:  Normal.  LUNGS:  Scattered rales at the bases.  HEART:  S1, S2.  ABDOMEN:   Soft, nontender.  No hepatosplenomegaly.  There is no inguinal   lymphadenopathy.  EXTREMITIES:  There is +1 edema in the lower extremities.  SKIN:  No skin rash.  NEUROLOGIC:  The patient is alert and oriented x3.  PSYCHIATRIC:  Mood, the patient is anxious.     LABORATORY DATA:  Her recent labs were reviewed.     DIAGNOSTIC DATA:  She also had a chest x-ray done last night.  I reviewed the   image myself, which showed airspace disease, most likely pulmonary edema.     ASSESSMENT:  1.  Respiratory failure secondary to pulmonary edema.  2.  End-stage renal disease.  3.  Uncontrolled hypertension.  4.  Anemia.  5.  Hyponatremic.     PLAN:  1.  We will plan urgent dialysis to manage respiratory failure.  2.  Evaluate daily for the need for dialysis.  3.  Renal diet.  4.  Renal dose all medications.  5.  Avoid nephrotoxins.  6.  Erythropoietin with dialysis.  7.  Prognosis is guarded.  8.  As for the hyponatremia, we will reevaluate after ultrafiltration with   dialysis.  In the meantime, I recommend to decrease free water intake.     Plan discussed in detail with Dr. Richards.        ______________________________  FADI NAJJAR, MD FN/MITCHELL/DOMINIQUE    DD:  04/18/2021 12:42  DT:  04/18/2021 17:36    Job#:  [id number]

## 2021-04-19 NOTE — DISCHARGE PLANNING
Outpatient Dialysis Note    Confirmed patient is active at:    [address] [address]    Schedule: Monday, Wednesday, Friday   Time: 06:00am     Patient is seen by Dr. Trent in HD clinic.    Spoke with Leila at facility who confirmed.    Forwarded records for review.    Mary Han- Dialysis Coordinator # [phone]  [facility]   Neuro

## 2021-04-20 NOTE — PROGRESS NOTES
Late entry:    Telemetry Summary  Sinus tachycardia rate 100s-110s  MT: 0.14  QRS: 0.08  QT: 0.30  Ectopy: none

## 2021-04-21 ENCOUNTER — APPOINTMENT (OUTPATIENT)
Dept: RADIOLOGY | Facility: MEDICAL CENTER | Age: 26
End: 2021-04-21
Attending: EMERGENCY MEDICINE
Payer: COMMERCIAL

## 2021-04-21 ENCOUNTER — HOSPITAL ENCOUNTER (EMERGENCY)
Facility: MEDICAL CENTER | Age: 26
End: 2021-04-21
Attending: EMERGENCY MEDICINE
Payer: COMMERCIAL

## 2021-04-21 VITALS
OXYGEN SATURATION: 100 % | HEIGHT: 66 IN | RESPIRATION RATE: 16 BRPM | WEIGHT: 117.95 LBS | SYSTOLIC BLOOD PRESSURE: 165 MMHG | DIASTOLIC BLOOD PRESSURE: 114 MMHG | HEART RATE: 92 BPM | TEMPERATURE: 97.6 F | BODY MASS INDEX: 18.96 KG/M2

## 2021-04-21 DIAGNOSIS — R07.9 CHEST PAIN, UNSPECIFIED TYPE: Primary | ICD-10-CM

## 2021-04-21 DIAGNOSIS — M32.9 LUPUS (HCC): ICD-10-CM

## 2021-04-21 LAB
ANION GAP SERPL CALC-SCNC: 9 MMOL/L (ref 7–16)
ANISOCYTOSIS BLD QL SMEAR: ABNORMAL
BASOPHILS # BLD AUTO: 0.6 % (ref 0–1.8)
BASOPHILS # BLD: 0.05 K/UL (ref 0–0.12)
BUN SERPL-MCNC: 6 MG/DL (ref 8–22)
CALCIUM SERPL-MCNC: 9.1 MG/DL (ref 8.4–10.2)
CHLORIDE SERPL-SCNC: 92 MMOL/L (ref 96–112)
CO2 SERPL-SCNC: 31 MMOL/L (ref 20–33)
COMMENT 1642: NORMAL
CREAT SERPL-MCNC: 2.04 MG/DL (ref 0.5–1.4)
EOSINOPHIL # BLD AUTO: 0.03 K/UL (ref 0–0.51)
EOSINOPHIL NFR BLD: 0.4 % (ref 0–6.9)
ERYTHROCYTE [DISTWIDTH] IN BLOOD BY AUTOMATED COUNT: 54.2 FL (ref 35.9–50)
GLUCOSE SERPL-MCNC: 93 MG/DL (ref 65–99)
HCT VFR BLD AUTO: 27.8 % (ref 37–47)
HGB BLD-MCNC: 8.2 G/DL (ref 12–16)
HYPOCHROMIA BLD QL SMEAR: ABNORMAL
IMM GRANULOCYTES # BLD AUTO: 0.12 K/UL (ref 0–0.11)
IMM GRANULOCYTES NFR BLD AUTO: 1.5 % (ref 0–0.9)
LG PLATELETS BLD QL SMEAR: NORMAL
LYMPHOCYTES # BLD AUTO: 0.32 K/UL (ref 1–4.8)
LYMPHOCYTES NFR BLD: 4.1 % (ref 22–41)
MCH RBC QN AUTO: 26.1 PG (ref 27–33)
MCHC RBC AUTO-ENTMCNC: 29.5 G/DL (ref 33.6–35)
MCV RBC AUTO: 88.5 FL (ref 81.4–97.8)
MONOCYTES # BLD AUTO: 0.62 K/UL (ref 0–0.85)
MONOCYTES NFR BLD AUTO: 7.9 % (ref 0–13.4)
NEUTROPHILS # BLD AUTO: 6.72 K/UL (ref 2–7.15)
NEUTROPHILS NFR BLD: 85.5 % (ref 44–72)
NRBC # BLD AUTO: 0 K/UL
NRBC BLD-RTO: 0 /100 WBC
PLATELET # BLD AUTO: 136 K/UL (ref 164–446)
PLATELET BLD QL SMEAR: NORMAL
PMV BLD AUTO: 10.9 FL (ref 9–12.9)
POTASSIUM SERPL-SCNC: 3.6 MMOL/L (ref 3.6–5.5)
RBC # BLD AUTO: 3.14 M/UL (ref 4.2–5.4)
RBC BLD AUTO: PRESENT
SODIUM SERPL-SCNC: 132 MMOL/L (ref 135–145)
TROPONIN T SERPL-MCNC: 815 NG/L (ref 6–19)
WBC # BLD AUTO: 7.9 K/UL (ref 4.8–10.8)

## 2021-04-21 PROCEDURE — 700111 HCHG RX REV CODE 636 W/ 250 OVERRIDE (IP): Performed by: EMERGENCY MEDICINE

## 2021-04-21 PROCEDURE — 71045 X-RAY EXAM CHEST 1 VIEW: CPT

## 2021-04-21 PROCEDURE — 80048 BASIC METABOLIC PNL TOTAL CA: CPT

## 2021-04-21 PROCEDURE — 84484 ASSAY OF TROPONIN QUANT: CPT

## 2021-04-21 PROCEDURE — 85025 COMPLETE CBC W/AUTO DIFF WBC: CPT

## 2021-04-21 PROCEDURE — 96374 THER/PROPH/DIAG INJ IV PUSH: CPT

## 2021-04-21 PROCEDURE — 700102 HCHG RX REV CODE 250 W/ 637 OVERRIDE(OP): Performed by: EMERGENCY MEDICINE

## 2021-04-21 PROCEDURE — 96375 TX/PRO/DX INJ NEW DRUG ADDON: CPT

## 2021-04-21 PROCEDURE — A9270 NON-COVERED ITEM OR SERVICE: HCPCS | Performed by: EMERGENCY MEDICINE

## 2021-04-21 PROCEDURE — 96372 THER/PROPH/DIAG INJ SC/IM: CPT

## 2021-04-21 PROCEDURE — 99284 EMERGENCY DEPT VISIT MOD MDM: CPT

## 2021-04-21 RX ORDER — LORAZEPAM 2 MG/ML
0.5 INJECTION INTRAMUSCULAR ONCE
Status: COMPLETED | OUTPATIENT
Start: 2021-04-21 | End: 2021-04-21

## 2021-04-21 RX ORDER — LOSARTAN POTASSIUM 25 MG/1
25 TABLET ORAL ONCE
Status: COMPLETED | OUTPATIENT
Start: 2021-04-21 | End: 2021-04-21

## 2021-04-21 RX ORDER — HYDROMORPHONE HYDROCHLORIDE 1 MG/ML
1 INJECTION, SOLUTION INTRAMUSCULAR; INTRAVENOUS; SUBCUTANEOUS ONCE
Status: COMPLETED | OUTPATIENT
Start: 2021-04-21 | End: 2021-04-21

## 2021-04-21 RX ORDER — HYDROXYZINE HYDROCHLORIDE 25 MG/1
25 TABLET, FILM COATED ORAL ONCE
Status: COMPLETED | OUTPATIENT
Start: 2021-04-21 | End: 2021-04-21

## 2021-04-21 RX ADMIN — HYDROXYZINE HYDROCHLORIDE 25 MG: 25 TABLET, FILM COATED ORAL at 14:10

## 2021-04-21 RX ADMIN — HYDROMORPHONE HYDROCHLORIDE 1 MG: 1 INJECTION, SOLUTION INTRAMUSCULAR; INTRAVENOUS; SUBCUTANEOUS at 14:11

## 2021-04-21 RX ADMIN — LORAZEPAM 0.5 MG: 2 INJECTION INTRAMUSCULAR; INTRAVENOUS at 14:39

## 2021-04-21 RX ADMIN — LOSARTAN POTASSIUM 25 MG: 25 TABLET, FILM COATED ORAL at 15:47

## 2021-04-21 RX ADMIN — FENTANYL CITRATE 100 MCG: 50 INJECTION, SOLUTION INTRAMUSCULAR; INTRAVENOUS at 12:49

## 2021-04-21 ASSESSMENT — HEART SCORE
TROPONIN: 1-3 TIMES NORMAL LIMIT
HISTORY: SLIGHTLY SUSPICIOUS
AGE: <45
ECG: NORMAL
RISK FACTORS: >2 RISK FACTORS OR HX OF ATHEROSCLEROTIC DISEASE
HEART SCORE: 3

## 2021-04-21 ASSESSMENT — ENCOUNTER SYMPTOMS
COUGH: 0
ORTHOPNEA: 0
ABDOMINAL PAIN: 0
FEVER: 0
VOMITING: 0
NERVOUS/ANXIOUS: 1
SHORTNESS OF BREATH: 0
DIZZINESS: 0
PALPITATIONS: 0
CHILLS: 0
NAUSEA: 0
BACK PAIN: 0
NECK PAIN: 0
HEADACHES: 0

## 2021-04-21 ASSESSMENT — FIBROSIS 4 INDEX: FIB4 SCORE: 0.79

## 2021-04-21 NOTE — ED NOTES
Discharged pt via wheelchair on her own O2 of 2 L. PIV moved and medicated per order. Pt verbalized being anxious to leave to get to her PCP appointment. Pt was relaxing on discharge and the pain is now under control.

## 2021-04-21 NOTE — ED NOTES
PIV was established in the left arm US by Landon the ED TECH. No blood was obtained due to tentative IV ;line. Medicated and per yolanda. Pt still moans loudly.

## 2021-04-21 NOTE — ED PROVIDER NOTES
ED Provider Note     4/21/2021  12:35 PM    Means of Arrival: EMS  History obtained by: patient  Limitations:none  PCP: Ella Matthew  CODE STATUS: Full    CHIEF COMPLAINT  Chief Complaint   Patient presents with   • Chest Pain     chest pain and low back pain started during dialysis but worse after she went home today.       [facility]  Lily iNcole is a 25 y.o. female with lupus nephritis, receives dialysis, last dialysis was today, recently diagnosed pericarditis now presenting with chest pain.  She describes chest pain is central.  Radiates to her back.  No vomiting.  No shortness of breath.  She says she has had this pain before.  It improves with morphine.  She was hospitalized on April 17 April 19.  EKG showed diffuse ST segment elevation consistent with acute pericarditis.  Troponin at that time was 617.  Is also found to be fluid overloaded.  All of her symptoms improved after receiving dialysis.    REVIEW OF SYSTEMS  Review of Systems   Constitutional: Negative for chills and fever.   HENT: Negative for congestion.    Respiratory: Negative for cough and shortness of breath.    Cardiovascular: Positive for chest pain. Negative for palpitations and orthopnea.   Gastrointestinal: Negative for abdominal pain, nausea and vomiting.   Musculoskeletal: Negative for back pain and neck pain.   Neurological: Negative for dizziness and headaches.   Psychiatric/Behavioral: The patient is nervous/anxious.    All other systems reviewed and are negative.    See HPI for further details.    PAST MEDICAL HISTORY   has a past medical history of Anemia (01/17/2018), AVF (arteriovenous fistula) ([facility]), Dialysis patient ([facility]), ESRD (end stage renal disease) on dialysis ([facility]) (01/17/2018), Heart burn, Hypertension (01/17/2018), Indigestion, Lupus ([facility]), Migraines (01/17/2018), and Seizure ([facility]) (2013).    SOCIAL HISTORY  Social History     Tobacco Use   • Smoking status: Never Smoker   • Smokeless tobacco: Never Used   Substance  "and Sexual Activity   • Alcohol use: No   • Drug use: No   • Sexual activity: Not on file       SURGICAL HISTORY   has a past surgical history that includes ronak by laparoscopy (4/5/2010); av fistula creation (Right); angioplasty (01/17/2018); other; other; gastroscopy-endo (12/9/2019); gastroscopy (N/A, 5/30/2020); gastroscopy-endo (9/18/2020); upper gi endoscopy,ctrl bleed (11/12/2020); upper gi endoscopy,biopsy (11/12/2020); gastroscopy-endo (11/12/2020); colonoscopy,diagnostic (1/8/2021); upper gi endoscopy,diagnosis (1/8/2021); upper gi endoscopy,ctrl bleed (1/8/2021); upper gi endoscopy,diagnosis (3/5/2021); upper gi endoscopy,diagnosis (N/A, 3/19/2021); and upper gi endoscopy,ctrl bleed (3/19/2021).    CURRENT MEDICATIONS  Home Medications     Reviewed by Shefali Spencer (Pharmacy Tech) on 04/21/21 at 1442  Med List Status: Complete   Medication Last Dose Status   amLODIPine (NORVASC) 10 MG Tab 4/20/2021 Active   carvedilol (COREG) 25 MG Tab 4/20/2021 Active   furosemide (LASIX) 80 MG Tab 4/20/2021 Active   hydroxychloroquine (PLAQUENIL) 200 MG Tab 4/20/2021 Active   losartan (COZAAR) 25 MG Tab 4/20/2021 Active   mycophenolate (MYFORTIC) 180 MG EC tablet 4/20/2021 Active   ondansetron (ZOFRAN ODT) 4 MG TABLET DISPERSIBLE 4/20/2021 Active   predniSONE (DELTASONE) 5 MG Tab 4/20/2021 Active   sucralfate (CARAFATE) 1 GM/10ML Suspension 4/20/2021 Active                ALLERGIES  Allergies   Allergen Reactions   • Cephalexin Rash     .   • Clindamycin Rash     .   • Methylprednisolone Unspecified     Anxious   • Metoprolol Rash     .   • Norco [Hydrocodone-Acetaminophen] Nausea       PHYSICAL EXAM  VITAL SIGNS: BP (!) 165/114   Pulse 92   Temp 36.4 °C (97.6 °F) (Temporal)   Resp 16   Ht 1.676 m (5' 6\")   Wt 53.5 kg (117 lb 15.1 oz)   LMP 03/31/2021 (Approximate)   SpO2 100%   BMI 19.04 kg/m²     Pulse ox interpretation: I interpret this pulse ox as normal.  Constitutional: 25 year old woman, tearful " and appears to be in pain.   HENT: No signs of trauma, Bilateral external ears normal, Nose normal.   Eyes: Pupils are equal, Conjunctiva normal, Non-icteric.   Neck: Normal range of motion, No tenderness, Supple, No stridor.   Cardiovascular: Increased rate and regular rhythm, no murmurs. Symmetric distal pulses. No cyanosis of extremities. No peripheral edema of extremities.  Thorax & Lungs: Normal breath sounds, No respiratory distress, No wheezing, No chest tenderness.   Abdomen: Bowel sounds normal, Soft, No tenderness, No masses, No pulsatile masses. No peritoneal signs.  Skin: Warm, Dry, chronic skin changes typical of lupus. No areas of fluctuance of induration. No erythema or cellulitic changes.   Back: No midline bony tenderness, No CVA tenderness.   Musculoskeletal: Good range of motion in all major joints. No tenderness to palpation or major deformities noted.   Neurologic: Alert , Normal motor function, Normal sensory function, No focal deficits noted.   Psychiatric: Affect normal, Judgment normal, Mood normal.   Physical Exam      DIAGNOSTIC STUDIES / PROCEDURES    EKG  Results for orders placed or performed during the hospital encounter of 21   EKG   Result Value Ref Range    Report       [facility] Emergency Dept.    Test Date:  2021  Pt Name:    CASE WOODSON            Department: [facility]  MRN:        [id number]                      Room:       -ROOM 3  Gender:     Female                       Technician: [id number]  :        1995                   Requested By:SHANNON CORRAL  Order #:    [id number]                    Reading MD: Shannon Corral MD    Measurements  Intervals                                Axis  Rate:       121                          P:          42  OR:         139                          QRS:        -9  QRSD:       106                          T:          94  QT:         338  QTc:        480    Interpretive Statements  Sinus tachycardia  LAE,  consider biatrial enlargement  LVH with secondary repolarization abnormality  Borderline prolonged QT interval  Compared to ECG 2021 07:45:02  Early repolarization now present  Electronically Signed On 2021 0:52:20 PDT by Christopher Hussein MD     EKG   Result Value Ref Range    Report       [facility]    Test Date:  2021  Pt Name:    CASE WOODSON            Department: MED  MRN:        [id number]                      Room:       3314  Gender:     Female                       Technician: [id number]  :        1995                   Requested By:RASHEL GONZALEZ  Order #:    [id number]                    Reading MD: Nabeel Leone MD    Measurements  Intervals                                Axis  Rate:       127                          P:          50  AK:         136                          QRS:        -9  QRSD:       90                           T:          118  QT:         313  QTc:        456    Interpretive Statements  Sinus tachycardia  Ventricular premature complex  LVH with secondary repolarization abnormality  Compared to ECG 2021 23:48:59  NO SIGNIFICANT CHANGES  Electronically Signed On 2021 11:07:50 PDT by Nabeel Leone MD           LABS  Pertinent Labs & Imaging studies reviewed. (See chart for details)    RADIOLOGY  Pertinent Labs & Imaging studies reviewed. (See chart for details)    COURSE & MEDICAL DECISION MAKING  Pertinent Labs & Imaging studies reviewed. (See chart for details)    12:35 PM This is an emergent evaluation of a  25 y.o. female with lupus, pericarditis, ESRD, chronic oxygen needs who presents with central chest pain after dialysis today. She says she has had severe pain like this before and it improved with morphine. EKG without significant changes. Sinus tachycardia. Will evaluate for possible MI (expect elevated troponin and will be looking for significant changes), pericarditis, pericardial effusion, chronic pain exacerbation.     Pulse ox  with saturation >95% on her on home amount of 02 2L NC.     2:24 PM  She is now received fentanyl and Dilaudid for pain.  Continues to have pain concerns.  Troponin is 815.  Previous levels 617, 615 last week.  CT shows improvement in previous pulmonary edema.  No change in cardiac silhouette.  No mediastinal widening.  I did use bedside ultrasound to look for any signs of pericardial effusion and there are none.  We will continue to treat pain here in the emergency department.  May need to consider advanced imaging if pain persist. CBC, CMP similar to prior studies. No indication for further dialysis today.     3:30 PM Pain dramatically improved.  She requested discharge because she has rheumatology appointment within the next hour.  She was discharged in good condition.  Her mother was here to help her with transportation to her appointment.  She did forget to take her losartan today and she was given her home dose.  Her blood pressure did trend downward during her time here in the emergency department. Tachycardia resolved.  Unclear exactly what caused the pain but she has had this many times in the past.  There were no signs of an emergent condition that is worsening.  I believe the elevated troponin likely is chronic.    HEART Score= 3-4 (difficult to score due to chronic troponin elevation) Low Risk    The patient will return for worsening symptoms and is stable at the time of discharge. The patient verbalizes understanding. Guidance was provided on appropriate use of medications including driving under the influence, overdose, and side effects.         FINAL IMPRESSION    ICD-10-CM   1. Chest pain, unspecified type  R07.9   2. Lupus (HCC)  M32.9            This dictation was created using voice recognition software. The accuracy of the dictation is limited to the abilities of the software. I expect there may be some errors of grammar and possibly content. The nursing notes were reviewed and certain aspects of  this information were incorporated into this note.    Electronically signed by: Curtis Devries II, M.D., 4/21/2021 12:35 PM

## 2021-04-21 NOTE — ED TRIAGE NOTES
Patient c/o chest pain and low back pain during dialysis that got worse when she went home. Currently crying in wheelchair.

## 2021-04-29 ENCOUNTER — OUTPATIENT INFUSION SERVICES (OUTPATIENT)
Dept: ONCOLOGY | Facility: MEDICAL CENTER | Age: 26
End: 2021-04-29
Attending: INTERNAL MEDICINE
Payer: COMMERCIAL

## 2021-04-29 VITALS
HEART RATE: 107 BPM | OXYGEN SATURATION: 98 % | BODY MASS INDEX: 19.83 KG/M2 | HEIGHT: 65 IN | DIASTOLIC BLOOD PRESSURE: 121 MMHG | WEIGHT: 119.05 LBS | SYSTOLIC BLOOD PRESSURE: 185 MMHG | TEMPERATURE: 97.8 F | RESPIRATION RATE: 19 BRPM

## 2021-04-29 DIAGNOSIS — D64.9 SYMPTOMATIC ANEMIA: ICD-10-CM

## 2021-04-29 LAB
ABO GROUP BLD: ABNORMAL
ANISOCYTOSIS BLD QL SMEAR: ABNORMAL
BARCODED ABORH UBTYP: 5100
BARCODED ABORH UBTYP: 5100
BARCODED PRD CODE UBPRD: ABNORMAL
BARCODED PRD CODE UBPRD: ABNORMAL
BARCODED UNIT NUM UBUNT: ABNORMAL
BARCODED UNIT NUM UBUNT: ABNORMAL
BASOPHILS # BLD AUTO: 0.8 % (ref 0–1.8)
BASOPHILS # BLD: 0.05 K/UL (ref 0–0.12)
BLD GP AB INVEST PLASRBC-IMP: ABNORMAL
BLD GP AB SCN SERPL QL: ABNORMAL
COMMENT 1642: NORMAL
COMPONENT R 8504R: ABNORMAL
COMPONENT R 8504R: ABNORMAL
DAT C3D-SP REAG RBC QL: ABNORMAL
DAT IGG-SP REAG RBC QL: ABNORMAL
EOSINOPHIL # BLD AUTO: 0.03 K/UL (ref 0–0.51)
EOSINOPHIL NFR BLD: 0.5 % (ref 0–6.9)
ERYTHROCYTE [DISTWIDTH] IN BLOOD BY AUTOMATED COUNT: 60 FL (ref 35.9–50)
HCT VFR BLD AUTO: 25.8 % (ref 37–47)
HGB BLD-MCNC: 7.6 G/DL (ref 12–16)
IMM GRANULOCYTES # BLD AUTO: 0.04 K/UL (ref 0–0.11)
IMM GRANULOCYTES NFR BLD AUTO: 0.7 % (ref 0–0.9)
LYMPHOCYTES # BLD AUTO: 0.54 K/UL (ref 1–4.8)
LYMPHOCYTES NFR BLD: 9.1 % (ref 22–41)
MACROCYTES BLD QL SMEAR: ABNORMAL
MCH RBC QN AUTO: 26.5 PG (ref 27–33)
MCHC RBC AUTO-ENTMCNC: 29.5 G/DL (ref 33.6–35)
MCV RBC AUTO: 89.9 FL (ref 81.4–97.8)
MONOCYTES # BLD AUTO: 0.34 K/UL (ref 0–0.85)
MONOCYTES NFR BLD AUTO: 5.7 % (ref 0–13.4)
MORPHOLOGY BLD-IMP: NORMAL
NEUTROPHILS # BLD AUTO: 4.94 K/UL (ref 2–7.15)
NEUTROPHILS NFR BLD: 83.2 % (ref 44–72)
NRBC # BLD AUTO: 0 K/UL
NRBC BLD-RTO: 0 /100 WBC
PLATELET # BLD AUTO: 148 K/UL (ref 164–446)
PLATELET BLD QL SMEAR: NORMAL
PMV BLD AUTO: 9.8 FL (ref 9–12.9)
PRODUCT TYPE UPROD: ABNORMAL
PRODUCT TYPE UPROD: ABNORMAL
RBC # BLD AUTO: 2.87 M/UL (ref 4.2–5.4)
RBC BLD AUTO: PRESENT
RH BLD: ABNORMAL
UNIT STATUS USTAT: ABNORMAL
UNIT STATUS USTAT: ABNORMAL
WBC # BLD AUTO: 5.9 K/UL (ref 4.8–10.8)

## 2021-04-29 PROCEDURE — 86922 COMPATIBILITY TEST ANTIGLOB: CPT | Mod: 91

## 2021-04-29 PROCEDURE — 85025 COMPLETE CBC W/AUTO DIFF WBC: CPT

## 2021-04-29 PROCEDURE — 36415 COLL VENOUS BLD VENIPUNCTURE: CPT | Performed by: CLINICAL NURSE SPECIALIST

## 2021-04-29 PROCEDURE — 86850 RBC ANTIBODY SCREEN: CPT

## 2021-04-29 PROCEDURE — 86880 COOMBS TEST DIRECT: CPT

## 2021-04-29 PROCEDURE — 86860 RBC ANTIBODY ELUTION: CPT

## 2021-04-29 PROCEDURE — 86901 BLOOD TYPING SEROLOGIC RH(D): CPT

## 2021-04-29 PROCEDURE — 86900 BLOOD TYPING SEROLOGIC ABO: CPT

## 2021-04-29 PROCEDURE — 86902 BLOOD TYPE ANTIGEN DONOR EA: CPT | Mod: 91

## 2021-04-29 PROCEDURE — 86870 RBC ANTIBODY IDENTIFICATION: CPT

## 2021-04-29 PROCEDURE — 86922 COMPATIBILITY TEST ANTIGLOB: CPT

## 2021-04-29 RX ORDER — DIPHENHYDRAMINE HYDROCHLORIDE 50 MG/ML
25 INJECTION INTRAMUSCULAR; INTRAVENOUS PRN
Status: CANCELLED | OUTPATIENT
Start: 2021-04-29

## 2021-04-29 RX ORDER — 0.9 % SODIUM CHLORIDE 0.9 %
10 VIAL (ML) INJECTION PRN
Status: CANCELLED | OUTPATIENT
Start: 2021-04-29

## 2021-04-29 RX ORDER — DIPHENHYDRAMINE HCL 25 MG
25 TABLET ORAL ONCE
Status: CANCELLED | OUTPATIENT
Start: 2021-04-29 | End: 2021-04-29

## 2021-04-29 RX ORDER — HEPARIN SODIUM (PORCINE) LOCK FLUSH IV SOLN 100 UNIT/ML 100 UNIT/ML
500 SOLUTION INTRAVENOUS PRN
Status: CANCELLED | OUTPATIENT
Start: 2021-04-29

## 2021-04-29 RX ORDER — 0.9 % SODIUM CHLORIDE 0.9 %
VIAL (ML) INJECTION PRN
Status: CANCELLED | OUTPATIENT
Start: 2021-04-29

## 2021-04-29 RX ORDER — ACETAMINOPHEN 325 MG/1
650 TABLET ORAL PRN
Status: CANCELLED | OUTPATIENT
Start: 2021-04-29

## 2021-04-29 RX ORDER — ACETAMINOPHEN 325 MG/1
650 TABLET ORAL ONCE
Status: CANCELLED | OUTPATIENT
Start: 2021-04-29

## 2021-04-29 RX ORDER — SODIUM CHLORIDE 9 MG/ML
INJECTION, SOLUTION INTRAVENOUS CONTINUOUS
Status: CANCELLED | OUTPATIENT
Start: 2021-04-29

## 2021-04-29 RX ORDER — 0.9 % SODIUM CHLORIDE 0.9 %
3 VIAL (ML) INJECTION PRN
Status: CANCELLED | OUTPATIENT
Start: 2021-04-29

## 2021-04-29 ASSESSMENT — FIBROSIS 4 INDEX: FIB4 SCORE: 0.88

## 2021-04-29 NOTE — PROGRESS NOTES
Ms. Nicole is in infusion for CBC/COD.  She is on 6L O2 via NC.  Her arms have trace edema today.  BP elevated. She states she frequently has higher blood pressure the day before dialysis.  Labs drawn using butterfly needle x3 attempts.  BB communication sent. Gauze and Coban applied and Ms. Nicole was discharged ambulatory to home in stable condition.     Labs resulted and hemoglobin 7.6.  Ms. Nicole will need one unit of PRBC's on Saturday per parameters.

## 2021-05-01 ENCOUNTER — OUTPATIENT INFUSION SERVICES (OUTPATIENT)
Dept: ONCOLOGY | Facility: MEDICAL CENTER | Age: 26
End: 2021-05-01
Attending: INTERNAL MEDICINE
Payer: COMMERCIAL

## 2021-05-01 VITALS
HEIGHT: 65 IN | HEART RATE: 83 BPM | SYSTOLIC BLOOD PRESSURE: 140 MMHG | RESPIRATION RATE: 20 BRPM | DIASTOLIC BLOOD PRESSURE: 96 MMHG | WEIGHT: 119.05 LBS | OXYGEN SATURATION: 100 % | BODY MASS INDEX: 19.83 KG/M2 | TEMPERATURE: 98.1 F

## 2021-05-01 DIAGNOSIS — D64.9 SYMPTOMATIC ANEMIA: ICD-10-CM

## 2021-05-01 PROCEDURE — A9270 NON-COVERED ITEM OR SERVICE: HCPCS | Performed by: INTERNAL MEDICINE

## 2021-05-01 PROCEDURE — 96374 THER/PROPH/DIAG INJ IV PUSH: CPT

## 2021-05-01 PROCEDURE — 36430 TRANSFUSION BLD/BLD COMPNT: CPT

## 2021-05-01 PROCEDURE — 86945 BLOOD PRODUCT/IRRADIATION: CPT

## 2021-05-01 PROCEDURE — 700111 HCHG RX REV CODE 636 W/ 250 OVERRIDE (IP): Performed by: INTERNAL MEDICINE

## 2021-05-01 PROCEDURE — 86922 COMPATIBILITY TEST ANTIGLOB: CPT | Mod: 91

## 2021-05-01 PROCEDURE — P9016 RBC LEUKOCYTES REDUCED: HCPCS

## 2021-05-01 PROCEDURE — 700102 HCHG RX REV CODE 250 W/ 637 OVERRIDE(OP): Performed by: INTERNAL MEDICINE

## 2021-05-01 PROCEDURE — 306780 HCHG STAT FOR TRANSFUSION PER CASE

## 2021-05-01 RX ORDER — 0.9 % SODIUM CHLORIDE 0.9 %
10 VIAL (ML) INJECTION PRN
Status: CANCELLED | OUTPATIENT
Start: 2021-05-01

## 2021-05-01 RX ORDER — DIPHENHYDRAMINE HYDROCHLORIDE 50 MG/ML
25 INJECTION INTRAMUSCULAR; INTRAVENOUS PRN
Status: COMPLETED | OUTPATIENT
Start: 2021-05-01 | End: 2021-05-01

## 2021-05-01 RX ORDER — ACETAMINOPHEN 325 MG/1
650 TABLET ORAL PRN
Status: CANCELLED | OUTPATIENT
Start: 2021-05-01

## 2021-05-01 RX ORDER — DIPHENHYDRAMINE HYDROCHLORIDE 50 MG/ML
25 INJECTION INTRAMUSCULAR; INTRAVENOUS PRN
Status: CANCELLED | OUTPATIENT
Start: 2021-05-01

## 2021-05-01 RX ORDER — DIPHENHYDRAMINE HCL 25 MG
25 TABLET ORAL ONCE
Status: DISCONTINUED | OUTPATIENT
Start: 2021-05-01 | End: 2021-05-01 | Stop reason: HOSPADM

## 2021-05-01 RX ORDER — ACETAMINOPHEN 325 MG/1
650 TABLET ORAL ONCE
Status: CANCELLED | OUTPATIENT
Start: 2021-05-01

## 2021-05-01 RX ORDER — ACETAMINOPHEN 325 MG/1
650 TABLET ORAL ONCE
Status: COMPLETED | OUTPATIENT
Start: 2021-05-01 | End: 2021-05-01

## 2021-05-01 RX ORDER — 0.9 % SODIUM CHLORIDE 0.9 %
VIAL (ML) INJECTION PRN
Status: CANCELLED | OUTPATIENT
Start: 2021-05-01

## 2021-05-01 RX ORDER — DIPHENHYDRAMINE HCL 25 MG
25 TABLET ORAL ONCE
Status: CANCELLED | OUTPATIENT
Start: 2021-05-01 | End: 2021-05-01

## 2021-05-01 RX ORDER — 0.9 % SODIUM CHLORIDE 0.9 %
3 VIAL (ML) INJECTION PRN
Status: CANCELLED | OUTPATIENT
Start: 2021-05-01

## 2021-05-01 RX ORDER — HEPARIN SODIUM (PORCINE) LOCK FLUSH IV SOLN 100 UNIT/ML 100 UNIT/ML
500 SOLUTION INTRAVENOUS PRN
Status: CANCELLED | OUTPATIENT
Start: 2021-05-01

## 2021-05-01 RX ORDER — SODIUM CHLORIDE 9 MG/ML
INJECTION, SOLUTION INTRAVENOUS CONTINUOUS
Status: CANCELLED | OUTPATIENT
Start: 2021-05-01

## 2021-05-01 RX ADMIN — DIPHENHYDRAMINE HYDROCHLORIDE 25 MG: 50 INJECTION INTRAMUSCULAR; INTRAVENOUS at 10:26

## 2021-05-01 RX ADMIN — ACETAMINOPHEN 650 MG: 325 TABLET ORAL at 10:25

## 2021-05-01 ASSESSMENT — FIBROSIS 4 INDEX: FIB4 SCORE: 0.81

## 2021-05-02 ENCOUNTER — HOSPITAL ENCOUNTER (EMERGENCY)
Facility: MEDICAL CENTER | Age: 26
End: 2021-05-02
Attending: EMERGENCY MEDICINE
Payer: COMMERCIAL

## 2021-05-02 ENCOUNTER — APPOINTMENT (OUTPATIENT)
Dept: RADIOLOGY | Facility: MEDICAL CENTER | Age: 26
End: 2021-05-02
Attending: EMERGENCY MEDICINE
Payer: COMMERCIAL

## 2021-05-02 VITALS
OXYGEN SATURATION: 100 % | RESPIRATION RATE: 20 BRPM | BODY MASS INDEX: 18.69 KG/M2 | TEMPERATURE: 98.4 F | SYSTOLIC BLOOD PRESSURE: 160 MMHG | DIASTOLIC BLOOD PRESSURE: 115 MMHG | HEART RATE: 83 BPM | WEIGHT: 119.05 LBS | HEIGHT: 67 IN

## 2021-05-02 DIAGNOSIS — R07.9 CHEST PAIN, UNSPECIFIED TYPE: ICD-10-CM

## 2021-05-02 DIAGNOSIS — H00.012 HORDEOLUM EXTERNUM OF RIGHT LOWER EYELID: ICD-10-CM

## 2021-05-02 LAB
ALBUMIN SERPL BCP-MCNC: 3.4 G/DL (ref 3.2–4.9)
ALBUMIN/GLOB SERPL: 0.8 G/DL
ALP SERPL-CCNC: 77 U/L (ref 30–99)
ALT SERPL-CCNC: 6 U/L (ref 2–50)
ANION GAP SERPL CALC-SCNC: 8 MMOL/L (ref 7–16)
AST SERPL-CCNC: 13 U/L (ref 12–45)
BASOPHILS # BLD AUTO: 1.2 % (ref 0–1.8)
BASOPHILS # BLD: 0.04 K/UL (ref 0–0.12)
BILIRUB SERPL-MCNC: 0.5 MG/DL (ref 0.1–1.5)
BUN SERPL-MCNC: 17 MG/DL (ref 8–22)
CALCIUM SERPL-MCNC: 9.4 MG/DL (ref 8.4–10.2)
CHLORIDE SERPL-SCNC: 96 MMOL/L (ref 96–112)
CO2 SERPL-SCNC: 31 MMOL/L (ref 20–33)
CREAT SERPL-MCNC: 3.05 MG/DL (ref 0.5–1.4)
EKG IMPRESSION: NORMAL
EOSINOPHIL # BLD AUTO: 0.06 K/UL (ref 0–0.51)
EOSINOPHIL NFR BLD: 1.7 % (ref 0–6.9)
ERYTHROCYTE [DISTWIDTH] IN BLOOD BY AUTOMATED COUNT: 59.6 FL (ref 35.9–50)
GLOBULIN SER CALC-MCNC: 4.4 G/DL (ref 1.9–3.5)
GLUCOSE SERPL-MCNC: 90 MG/DL (ref 65–99)
HCT VFR BLD AUTO: 29.9 % (ref 37–47)
HGB BLD-MCNC: 9 G/DL (ref 12–16)
IMM GRANULOCYTES # BLD AUTO: 0.02 K/UL (ref 0–0.11)
IMM GRANULOCYTES NFR BLD AUTO: 0.6 % (ref 0–0.9)
LYMPHOCYTES # BLD AUTO: 0.47 K/UL (ref 1–4.8)
LYMPHOCYTES NFR BLD: 13.7 % (ref 22–41)
MCH RBC QN AUTO: 27.1 PG (ref 27–33)
MCHC RBC AUTO-ENTMCNC: 30.1 G/DL (ref 33.6–35)
MCV RBC AUTO: 90.1 FL (ref 81.4–97.8)
MONOCYTES # BLD AUTO: 0.33 K/UL (ref 0–0.85)
MONOCYTES NFR BLD AUTO: 9.6 % (ref 0–13.4)
NEUTROPHILS # BLD AUTO: 2.51 K/UL (ref 2–7.15)
NEUTROPHILS NFR BLD: 73.2 % (ref 44–72)
NRBC # BLD AUTO: 0 K/UL
NRBC BLD-RTO: 0 /100 WBC
NT-PROBNP SERPL IA-MCNC: ABNORMAL PG/ML (ref 0–125)
PLATELET # BLD AUTO: 136 K/UL (ref 164–446)
PMV BLD AUTO: 9.9 FL (ref 9–12.9)
POTASSIUM SERPL-SCNC: 4.1 MMOL/L (ref 3.6–5.5)
PROT SERPL-MCNC: 7.8 G/DL (ref 6–8.2)
RBC # BLD AUTO: 3.32 M/UL (ref 4.2–5.4)
SODIUM SERPL-SCNC: 135 MMOL/L (ref 135–145)
TROPONIN T SERPL-MCNC: 684 NG/L (ref 6–19)
TROPONIN T SERPL-MCNC: 723 NG/L (ref 6–19)
WBC # BLD AUTO: 3.4 K/UL (ref 4.8–10.8)

## 2021-05-02 PROCEDURE — 99285 EMERGENCY DEPT VISIT HI MDM: CPT

## 2021-05-02 PROCEDURE — 93005 ELECTROCARDIOGRAM TRACING: CPT | Performed by: EMERGENCY MEDICINE

## 2021-05-02 PROCEDURE — 700102 HCHG RX REV CODE 250 W/ 637 OVERRIDE(OP): Performed by: EMERGENCY MEDICINE

## 2021-05-02 PROCEDURE — 80053 COMPREHEN METABOLIC PANEL: CPT

## 2021-05-02 PROCEDURE — 96375 TX/PRO/DX INJ NEW DRUG ADDON: CPT

## 2021-05-02 PROCEDURE — 93005 ELECTROCARDIOGRAM TRACING: CPT

## 2021-05-02 PROCEDURE — 96374 THER/PROPH/DIAG INJ IV PUSH: CPT

## 2021-05-02 PROCEDURE — A9270 NON-COVERED ITEM OR SERVICE: HCPCS | Performed by: EMERGENCY MEDICINE

## 2021-05-02 PROCEDURE — 83880 ASSAY OF NATRIURETIC PEPTIDE: CPT

## 2021-05-02 PROCEDURE — 700111 HCHG RX REV CODE 636 W/ 250 OVERRIDE (IP): Performed by: EMERGENCY MEDICINE

## 2021-05-02 PROCEDURE — 36415 COLL VENOUS BLD VENIPUNCTURE: CPT

## 2021-05-02 PROCEDURE — 85025 COMPLETE CBC W/AUTO DIFF WBC: CPT

## 2021-05-02 PROCEDURE — 71045 X-RAY EXAM CHEST 1 VIEW: CPT

## 2021-05-02 PROCEDURE — 84484 ASSAY OF TROPONIN QUANT: CPT

## 2021-05-02 RX ORDER — BACITRACIN ZINC AND POLYMYXIN B SULFATE 500; 10000 [USP'U]/G; [USP'U]/G
0.5 OINTMENT OPHTHALMIC EVERY 12 HOURS
Qty: 3.5 G | Refills: 0 | Status: SHIPPED | OUTPATIENT
Start: 2021-05-02 | End: 2021-05-09

## 2021-05-02 RX ORDER — ONDANSETRON 4 MG/1
4 TABLET, ORALLY DISINTEGRATING ORAL ONCE
Status: COMPLETED | OUTPATIENT
Start: 2021-05-02 | End: 2021-05-02

## 2021-05-02 RX ORDER — CLONIDINE HYDROCHLORIDE 0.1 MG/1
0.2 TABLET ORAL ONCE
Status: COMPLETED | OUTPATIENT
Start: 2021-05-02 | End: 2021-05-02

## 2021-05-02 RX ORDER — MORPHINE SULFATE 4 MG/ML
4 INJECTION, SOLUTION INTRAMUSCULAR; INTRAVENOUS ONCE
Status: COMPLETED | OUTPATIENT
Start: 2021-05-02 | End: 2021-05-02

## 2021-05-02 RX ADMIN — CLONIDINE HYDROCHLORIDE 0.2 MG: 0.1 TABLET ORAL at 05:15

## 2021-05-02 RX ADMIN — FAMOTIDINE 20 MG: 10 INJECTION INTRAVENOUS at 06:08

## 2021-05-02 RX ADMIN — ONDANSETRON 4 MG: 4 TABLET, ORALLY DISINTEGRATING ORAL at 05:13

## 2021-05-02 RX ADMIN — MORPHINE SULFATE 4 MG: 4 INJECTION INTRAVENOUS at 06:08

## 2021-05-02 ASSESSMENT — PAIN DESCRIPTION - DESCRIPTORS: DESCRIPTORS: STABBING;SHARP

## 2021-05-02 ASSESSMENT — PAIN DESCRIPTION - PAIN TYPE
TYPE: ACUTE PAIN

## 2021-05-02 ASSESSMENT — FIBROSIS 4 INDEX: FIB4 SCORE: 0.81

## 2021-05-02 NOTE — ED PROVIDER NOTES
ED Provider Note    Addendum:    Results for orders placed or performed during the hospital encounter of 05/02/21   CBC WITH DIFFERENTIAL   Result Value Ref Range    WBC 3.4 (L) 4.8 - 10.8 K/uL    RBC 3.32 (L) 4.20 - 5.40 M/uL    Hemoglobin 9.0 (L) 12.0 - 16.0 g/dL    Hematocrit 29.9 (L) 37.0 - 47.0 %    MCV 90.1 81.4 - 97.8 fL    MCH 27.1 27.0 - 33.0 pg    MCHC 30.1 (L) 33.6 - 35.0 g/dL    RDW 59.6 (H) 35.9 - 50.0 fL    Platelet Count 136 (L) 164 - 446 K/uL    MPV 9.9 9.0 - 12.9 fL    Neutrophils-Polys 73.20 (H) 44.00 - 72.00 %    Lymphocytes 13.70 (L) 22.00 - 41.00 %    Monocytes 9.60 0.00 - 13.40 %    Eosinophils 1.70 0.00 - 6.90 %    Basophils 1.20 0.00 - 1.80 %    Immature Granulocytes 0.60 0.00 - 0.90 %    Nucleated RBC 0.00 /100 WBC    Neutrophils (Absolute) 2.51 2.00 - 7.15 K/uL    Lymphs (Absolute) 0.47 (L) 1.00 - 4.80 K/uL    Monos (Absolute) 0.33 0.00 - 0.85 K/uL    Eos (Absolute) 0.06 0.00 - 0.51 K/uL    Baso (Absolute) 0.04 0.00 - 0.12 K/uL    Immature Granulocytes (abs) 0.02 0.00 - 0.11 K/uL    NRBC (Absolute) 0.00 K/uL   COMP METABOLIC PANEL   Result Value Ref Range    Sodium 135 135 - 145 mmol/L    Potassium 4.1 3.6 - 5.5 mmol/L    Chloride 96 96 - 112 mmol/L    Co2 31 20 - 33 mmol/L    Anion Gap 8.0 7.0 - 16.0    Glucose 90 65 - 99 mg/dL    Bun 17 8 - 22 mg/dL    Creatinine 3.05 (H) 0.50 - 1.40 mg/dL    Calcium 9.4 8.4 - 10.2 mg/dL    AST(SGOT) 13 12 - 45 U/L    ALT(SGPT) 6 2 - 50 U/L    Alkaline Phosphatase 77 30 - 99 U/L    Total Bilirubin 0.5 0.1 - 1.5 mg/dL    Albumin 3.4 3.2 - 4.9 g/dL    Total Protein 7.8 6.0 - 8.2 g/dL    Globulin 4.4 (H) 1.9 - 3.5 g/dL    A-G Ratio 0.8 g/dL   TROPONIN   Result Value Ref Range    Troponin T 723 (H) 6 - 19 ng/L   proBrain Natriuretic Peptide, NT   Result Value Ref Range    NT-proBNP >56490 (H) 0 - 125 pg/mL   ESTIMATED GFR   Result Value Ref Range    GFR If  23 (A) >60 mL/min/1.73 m 2    GFR If Non  19 (A) >60 mL/min/1.73 m 2    TROPONIN   Result Value Ref Range    Troponin T 684 (H) 6 - 19 ng/L   EKG   Result Value Ref Range    Report       [facility] Emergency Dept.    Test Date:  2021  Pt Name:    CASE WOODSON            Department: [facility]  MRN:        [id number]                      Room:  Gender:     Female                       Technician: [id number]  :        [id number]                   Requested By:ER TRIAGE PROTOCOL  Order #:    [id number]                    Reading MD: MURIEL SANCHEZ MD    Measurements  Intervals                                Axis  Rate:       89                           P:          57  AK:         152                          QRS:        -6  QRSD:       80                           T:          107  QT:         372  QTc:        453    Interpretive Statements  SINUS RHYTHM  PROBABLE LEFT ATRIAL ABNORMALITY  RSR' IN V1 OR V2, RIGHT VCD OR RVH  PROBABLE LVH WITH SECONDARY REPOL ABNRM  BASELINE WANDER IN LEAD(S) V2  Compared to ECG 2021 07:27:24  Right ventricular hypertrophy now present  RSR' in V1 or V2 now present  Sinus tachycardia no longer present  Ventricula r premature complex(es) no longer present  Electronically Signed On 2021 6:37:26 PDT by MURIEL SANCHEZ MD         Medical decision making:  The patient's blood pressure is improving and her second EKG shows no acute ischemia and troponin less than her previous.   He also has a stye underneath her right lower eyelid.  I will prescribe her medication for this.  I advised her to follow-up with her primary care physician and her physicians at [facility].    The patient will return for new or worsening symptoms and is stable at the time of discharge.    The patient is referred to a primary physician for blood pressure management, diabetic screening, and for all other preventative health concerns.      DISPOSITION:  Patient will be discharged home in stable condition.    FOLLOW UP:  Ella Matthew M.D.  [address] [facility]  [place] [address]  [phone]    Call in 2 days  As needed, If symptoms worsen    OUTPATIENT MEDICATIONS:  Discharge Medication List as of 5/2/2021  7:32 AM        START taking these medications    Details   bacitracin-polymyxin b (POLYSPORIN) [id number] UNIT/GM Ointment Apply 0.5 Inches to affected eye(s) every 12 hours for 7 days., Disp-3.5 g, R-0, Normal               Diagnosis  1. Chest pain, unspecified type    2. Hordeolum externum of right lower eyelid

## 2021-05-02 NOTE — PROGRESS NOTES
Pt presented to infusion for blood transfusion. Labs drawn on Thursday. PIV started on 2nd attempt, brisk blood return observed. Pre-meds given. 1 unit PRBC's transfused without issue. PIV flushed and removed, gauze drsg placed. Has next appt, left on foot to self care.

## 2021-05-02 NOTE — ED PROVIDER NOTES
"CHIEF COMPLAINT  Chest and back pain    [facility]  Lily Nicole is a 25 y.o. female who presents tonight with a chief complaint of shortness of breath and chest pain with radiation to her back.  Patient is a chronic dialysis patient, last dialyzed yesterday after she received 1 unit of packed red blood cells for her chronic anemia.  She denies any fever, chills, productive cough.    REVIEW OF SYSTEMS  See HPI for further details. All other system reviews are negative.    PAST MEDICAL HISTORY  Past Medical History:   Diagnosis Date   • Anemia 01/17/2018   • AVF (arteriovenous fistula) ([facility])     Right Arm   • Dialysis patient ([facility])      dialysis, M,W,F [facility]   • ESRD (end stage renal disease) on dialysis ([facility]) 01/17/2018    [facility] Fu   • Heart burn    • Hypertension 01/17/2018    \"Controlled with medication\"   • Indigestion    • Lupus ([facility])    • Migraines 01/17/2018   • Seizure ([facility]) 2013    from high blood pressure, reports 1 time event       FAMILY HISTORY  Family History   Problem Relation Age of Onset   • Diabetes Paternal Grandmother        SOCIAL HISTORY  Social History     Socioeconomic History   • Marital status: Single     Spouse name: Not on file   • Number of children: Not on file   • Years of education: Not on file   • Highest education level: Not on file   Occupational History   • Not on file   Tobacco Use   • Smoking status: Never Smoker   • Smokeless tobacco: Never Used   Substance and Sexual Activity   • Alcohol use: No   • Drug use: No   • Sexual activity: Not on file   Other Topics Concern   • Not on file   Social History Narrative   • Not on file     Social Determinants of Health     Financial Resource Strain: Low Risk    • Difficulty of Paying Living Expenses: Not hard at all   Food Insecurity: No Food Insecurity   • Worried About Running Out of Food in the Last Year: Never true   • Ran Out of Food in the Last Year: Never true   Transportation Needs: No Transportation Needs   • Lack of " Transportation (Medical): No   • Lack of Transportation (Non-Medical): No   Physical Activity:    • Days of Exercise per Week:    • Minutes of Exercise per Session:    Stress:    • Feeling of Stress :    Social Connections:    • Frequency of Communication with Friends and Family:    • Frequency of Social Gatherings with Friends and Family:    • Attends Shinto Services:    • Active Member of Clubs or Organizations:    • Attends Club or Organization Meetings:    • Marital Status:    Intimate Partner Violence:    • Fear of Current or Ex-Partner:    • Emotionally Abused:    • Physically Abused:    • Sexually Abused:        SURGICAL HISTORY  Past Surgical History:   Procedure Laterality Date   • PB UPPER GI ENDOSCOPY,DIAGNOSIS N/A 3/19/2021    Procedure: GASTROSCOPY;  Surgeon: Waylon Mcqueen M.D.;  Location: [facility];  Service: Gastroenterology   • PB UPPER GI ENDOSCOPY,CTRL BLEED  3/19/2021    Procedure: GASTROSCOPY, WITH ARGON PLASMA COAGULATION;  Surgeon: Waylon Mcqueen M.D.;  Location: [facility];  Service: Gastroenterology   • PB UPPER GI ENDOSCOPY,DIAGNOSIS  3/5/2021    Procedure: GASTROSCOPY - W/HEMOSTASIS;  Surgeon: Ej Silva M.D.;  Location: [facility];  Service: Gastroenterology   • PB COLONOSCOPY,DIAGNOSTIC  1/8/2021    Procedure: COLONOSCOPY;  Surgeon: Herbert Contreras M.D.;  Location: [facility];  Service: Gastroenterology   • PB UPPER GI ENDOSCOPY,DIAGNOSIS  1/8/2021    Procedure: GASTROSCOPY;  Surgeon: Herbert Contreras M.D.;  Location: [facility];  Service: Gastroenterology   • PB UPPER GI ENDOSCOPY,CTRL BLEED  1/8/2021    Procedure: GASTROSCOPY, WITH ARGON PLASMA COAGULATION;  Surgeon: Herbert Contreras M.D.;  Location: [facility];  Service: Gastroenterology   • PB UPPER GI ENDOSCOPY,CTRL BLEED  11/12/2020    Procedure: GASTROSCOPY, WITH ARGON PLASMA COAGULATION;  Surgeon: Gadiel Whitney M.D.;  Location: [facility];  Service:  "Gastroenterology   • PB UPPER GI ENDOSCOPY,BIOPSY  11/12/2020    Procedure: GASTROSCOPY, WITH BIOPSY;  Surgeon: Gadiel Whitney M.D.;  Location: SURGERY [facility];  Service: Gastroenterology   • GASTROSCOPY-ENDO  11/12/2020    Procedure: GASTROSCOPY;  Surgeon: Gadiel Whitney M.D.;  Location: SURGERY [facility];  Service: Gastroenterology   • GASTROSCOPY-ENDO  9/18/2020    Procedure: GASTROSCOPY;  Surgeon: Gadiel Whitney M.D.;  Location: SURGERY [facility];  Service: Gastroenterology   • GASTROSCOPY N/A 5/30/2020    Procedure: GASTROSCOPY;  Surgeon: Waylon Mcqueen M.D.;  Location: [facility];  Service: Gastroenterology   • GASTROSCOPY-ENDO  12/9/2019    Procedure: GASTROSCOPY;  Surgeon: Aaron Kam M.D.;  Location: [facility];  Service: Gastroenterology   • ANGIOPLASTY  01/17/2018    \"Right Arm AV-Fistulagram & Angioplastyx3\"   • ULI BY LAPAROSCOPY  4/5/2010    Performed by SYED MARTELL at SURGERY [facility] ORS   • AV FISTULA CREATION Right    • OTHER      renal biopsy x 3   • OTHER      bone marrow biopsy       CURRENT MEDICATIONS  See nurses notes    ALLERGIES  Allergies   Allergen Reactions   • Cephalexin Rash     .   • Clindamycin Rash     .   • Methylprednisolone Unspecified     Anxious   • Metoprolol Rash     .   • Norco [Hydrocodone-Acetaminophen] Nausea       PHYSICAL EXAM  VITAL SIGNS: BP (!) 188/136   Pulse 100   Temp 36.7 °C (98 °F) (Oral)   Resp 18   Ht 1.702 m (5' 7\")   Wt 54 kg (119 lb 0.8 oz)   SpO2 100%   BMI 18.65 kg/m²     Constitutional: Patient is a very ill-appearing young female in moderate distress.  HENT: Normocephalic, atraumatic, oral mucosa is moist.  Eyes: PERRL, EOMI  Neck: Supple   Cardiovascular: Tachycardic without murmur  Thorax & Lungs: Diminished air exchange in the bases with mild respiratory distress.  Abdomen: Bowel sounds normal in all four quadrants. Soft,nontender, no rebound , guarding, palpable masses.  No " flank tenderness.  Skin: Very sallow and pale appearing, cool and diaphoretic  Back: No cervical, thoracic, or lumbosacral tenderness.     Extremities: Peripheral pulses 4/4 No edema, No tenderness   Neurologic: Alert & oriented x 3, Normal motor function, Normal sensory function, DTR's 4/4 bilaterally.  Psychiatric: Affect normal, Judgment normal, Mood normal.     EKG  Results for orders placed or performed during the hospital encounter of 05/02/21   CBC WITH DIFFERENTIAL   Result Value Ref Range    WBC 3.4 (L) 4.8 - 10.8 K/uL    RBC 3.32 (L) 4.20 - 5.40 M/uL    Hemoglobin 9.0 (L) 12.0 - 16.0 g/dL    Hematocrit 29.9 (L) 37.0 - 47.0 %    MCV 90.1 81.4 - 97.8 fL    MCH 27.1 27.0 - 33.0 pg    MCHC 30.1 (L) 33.6 - 35.0 g/dL    RDW 59.6 (H) 35.9 - 50.0 fL    Platelet Count 136 (L) 164 - 446 K/uL    MPV 9.9 9.0 - 12.9 fL    Neutrophils-Polys 73.20 (H) 44.00 - 72.00 %    Lymphocytes 13.70 (L) 22.00 - 41.00 %    Monocytes 9.60 0.00 - 13.40 %    Eosinophils 1.70 0.00 - 6.90 %    Basophils 1.20 0.00 - 1.80 %    Immature Granulocytes 0.60 0.00 - 0.90 %    Nucleated RBC 0.00 /100 WBC    Neutrophils (Absolute) 2.51 2.00 - 7.15 K/uL    Lymphs (Absolute) 0.47 (L) 1.00 - 4.80 K/uL    Monos (Absolute) 0.33 0.00 - 0.85 K/uL    Eos (Absolute) 0.06 0.00 - 0.51 K/uL    Baso (Absolute) 0.04 0.00 - 0.12 K/uL    Immature Granulocytes (abs) 0.02 0.00 - 0.11 K/uL    NRBC (Absolute) 0.00 K/uL   COMP METABOLIC PANEL   Result Value Ref Range    Sodium 135 135 - 145 mmol/L    Potassium 4.1 3.6 - 5.5 mmol/L    Chloride 96 96 - 112 mmol/L    Co2 31 20 - 33 mmol/L    Anion Gap 8.0 7.0 - 16.0    Glucose 90 65 - 99 mg/dL    Bun 17 8 - 22 mg/dL    Creatinine 3.05 (H) 0.50 - 1.40 mg/dL    Calcium 9.4 8.4 - 10.2 mg/dL    AST(SGOT) 13 12 - 45 U/L    ALT(SGPT) 6 2 - 50 U/L    Alkaline Phosphatase 77 30 - 99 U/L    Total Bilirubin 0.5 0.1 - 1.5 mg/dL    Albumin 3.4 3.2 - 4.9 g/dL    Total Protein 7.8 6.0 - 8.2 g/dL    Globulin 4.4 (H) 1.9 - 3.5 g/dL     A-G Ratio 0.8 g/dL   TROPONIN   Result Value Ref Range    Troponin T 723 (H) 6 - 19 ng/L   ESTIMATED GFR   Result Value Ref Range    GFR If  23 (A) >60 mL/min/1.73 m 2    GFR If Non  19 (A) >60 mL/min/1.73 m 2         RADIOLOGY/PROCEDURES  DX-CHEST-PORTABLE (1 VIEW)   Final Result         Diffuse interstitial prominence and groundglass opacity throughout both lungs. This could relate to multifocal infection or pulmonary edema.            COURSE & MEDICAL DECISION MAKING  Pertinent Labs & Imaging studies reviewed. (See chart for details)  Patient received a Hep-Lock IV along with Zofran and clonidine.  Laboratories were drawn.  Patient's complaining of GI upset so she was given Pepcid along with some morphine for her generalized body pain.  Her troponin came back elevated at 723 BUN and creatinine are 17 and 3.05 respectively.  Her H&H is stable today at 9 and 29.9 with a white count of 3.4.  Chest x-ray shows increased interstitial prominence.  I ordered a Covid screen on her.  Patient's care will be transferred to my partner Dr. Sandoval for further disposition and improvement.  She is currently in guarded condition    FINAL IMPRESSION  1.  Acute chest pain  2.  Chronic kidney disease on chronic dialysis  3.  Uncontrolled hypertension  4.  Elevated troponin      Electronically signed by: Alix Huntley D.O., 5/2/2021 5:01 [facility] Provider Note

## 2021-05-02 NOTE — ED TRIAGE NOTES
"Chief Complaint   Patient presents with   • Chest Pain     onset 2 hours ago, 8/10 center chest pain \"stabbing sharp\"     Pt presents to ED for above complaint. +N/V  "

## 2021-05-04 ENCOUNTER — OFFICE VISIT (OUTPATIENT)
Dept: SLEEP MEDICINE | Facility: MEDICAL CENTER | Age: 26
End: 2021-05-04
Payer: COMMERCIAL

## 2021-05-04 VITALS
DIASTOLIC BLOOD PRESSURE: 78 MMHG | HEIGHT: 67 IN | BODY MASS INDEX: 19.18 KG/M2 | HEART RATE: 89 BPM | WEIGHT: 122.19 LBS | OXYGEN SATURATION: 98 % | SYSTOLIC BLOOD PRESSURE: 118 MMHG

## 2021-05-04 DIAGNOSIS — J96.11 CHRONIC RESPIRATORY FAILURE WITH HYPOXIA (HCC): ICD-10-CM

## 2021-05-04 PROCEDURE — 99214 OFFICE O/P EST MOD 30 MIN: CPT | Performed by: INTERNAL MEDICINE

## 2021-05-04 ASSESSMENT — ENCOUNTER SYMPTOMS
CARDIOVASCULAR NEGATIVE: 1
ABDOMINAL PAIN: 1
NEUROLOGICAL NEGATIVE: 1
PSYCHIATRIC NEGATIVE: 1
MUSCULOSKELETAL NEGATIVE: 1
RESPIRATORY NEGATIVE: 1
EYES NEGATIVE: 1

## 2021-05-04 ASSESSMENT — FIBROSIS 4 INDEX: FIB4 SCORE: 0.9

## 2021-05-04 NOTE — ASSESSMENT & PLAN NOTE
Reviewed imaging and labs and chart and also discussed with Dr. Wong (Patient's rheumatologist)  Her Ct scan and GG is all correlated with volume status and also her oxygen needs  Every time she receives blood transfusions she does get more SOB and hypoxic  With her Raynauds difficult to ambulate and assess  At this time do not think she has lupus nephritis and abnormal CT scan is related to volume status  She is chronically on steroids and high risk for PJP but no symptoms of cough and she has not progressed in the past 3 months  If she does where it is not correlated to transfusions then yes agree she would need to be assessed for PJP.  Agree with cardiology referral or optimization of heart failure  D/w pt

## 2021-05-04 NOTE — PROGRESS NOTES
Pulmonary Clinic follow up    Date of Service: 5/4/2021    Reason for follow up:  Establish Care (Referred by Dr Wong for DUONG, Supplemental oxygen, Lupus) and Other (ECHO 02/15, CT 04/14, ECHO 03/28)      Problem List Items Addressed This Visit     Chronic respiratory failure with hypoxia (HCC)     Reviewed imaging and labs and chart and also discussed with Dr. Wong (Patient's rheumatologist)  Her Ct scan and GG is all correlated with volume status and also her oxygen needs  Every time she receives blood transfusions she does get more SOB and hypoxic  With her Raynauds difficult to ambulate and assess  At this time do not think she has lupus nephritis and abnormal CT scan is related to volume status  She is chronically on steroids and high risk for PJP but no symptoms of cough and she has not progressed in the past 3 months  If she does where it is not correlated to transfusions then yes agree she would need to be assessed for PJP.  Agree with cardiology referral or optimization of heart failure  D/w pt                 History of Present Illness: Case URIBE Martín Nicole is a 25 y.o. female with a past medical history of lupus with lupus nephritis.  Referred by Dr. Wong rheumatology progressive dyspnea and 2 L nasal cannula.  She had a CT scan in April 2021 see below does show lower lobe dominant groundglass with patchy areas of groundglass bilaterally.  She is constantly anemic and is transfusion dependent.  Sent for lupus pneumonitis.    She is on the transplant list for [facility].  She gets dialysis hemodialysis Monday Wednesday Friday.  Her proBNP when reviewed throughout the chart is constantly elevated greater than 35 which it was during that CT scan showed below that showed the diffuse groundglass.  He has been referred for bronchoscopy for concern for pneumonitis.    Results for CASE REYNOSO (MRN [id number]) as of 5/4/2021 12:00   Ref. Range 1/25/2020 22:41 8/2/2020 19:41 12/1/2020 23:07 3/15/2021 02:30  3/17/2021 21:51 4/17/2021 23:45 5/2/2021 05:14   NT-proBNP Latest Ref Range: 0 - 125 pg/mL 98114 (H) >90453 (H) >84256 (H) >41722 (H) >42961 (H) >00300 (H) >91226 (H)      I last saw patient she was admitted to the hospital and rate 2021 when she was admitted with anemia.  EGD done during the admission showed hemorrhagic gastritis treated  APC RFA.    Since then she has had multiple admissions which include q. monthly if not twice monthly  This always due to abdominal pain as well as anemia with vomiting blood.    On 2 L of oxygen she is 100% think she might not need all the 2 L    3/2021: ECHO 45%           Review of Systems   Constitutional: Positive for malaise/fatigue.   HENT: Negative.    Eyes: Negative.    Respiratory: Negative.    Cardiovascular: Negative.    Gastrointestinal: Positive for abdominal pain.   Genitourinary: Negative.    Musculoskeletal: Negative.    Skin: Negative.    Neurological: Negative.    Endo/Heme/Allergies: Negative.    Psychiatric/Behavioral: Negative.        Current Outpatient Medications on File Prior to Visit   Medication Sig Dispense Refill   • bacitracin-polymyxin b (POLYSPORIN) 500-20148 UNIT/GM Ointment Apply 0.5 Inches to affected eye(s) every 12 hours for 7 days. 3.5 g 0   • ondansetron (ZOFRAN ODT) 4 MG TABLET DISPERSIBLE Take 1 tablet by mouth every four hours as needed for Nausea (give PO if no IV route available). 30 tablet 0   • carvedilol (COREG) 25 MG Tab Take 1 tablet by mouth 2 times a day with meals. (Patient taking differently: Take 25 mg by mouth every day.) 60 tablet 0   • losartan (COZAAR) 25 MG Tab Take 1 tablet by mouth every day. 30 tablet 0   • sucralfate (CARAFATE) 1 GM/10ML Suspension Take 10 mL by mouth 4 Times a Day,Before Meals and at Bedtime. (Patient taking differently: Take 1 g by mouth 2 times a day.) 1200 mL 1   • amLODIPine (NORVASC) 10 MG Tab Take 1 tablet by mouth every evening. 90 tablet 3   • predniSONE (DELTASONE) 5 MG Tab Take 5 mg by mouth  "every evening.     • mycophenolate (MYFORTIC) 180 MG EC tablet Take 1 Tab by mouth every evening. 30 Tab 1   • hydroxychloroquine (PLAQUENIL) 200 MG Tab Take 200 mg by mouth every evening.       No current facility-administered medications on file prior to visit.       Social History     Tobacco Use   • Smoking status: Never Smoker   • Smokeless tobacco: Never Used   Substance Use Topics   • Alcohol use: No   • Drug use: No        Past Medical History:   Diagnosis Date   • Anemia 01/17/2018   • AVF (arteriovenous fistula) (Formerly [facility])     Right Arm   • Chest pain    • Chest tightness    • Daytime sleepiness    • Dialysis patient (Formerly [facility])      dialysis, M,W,F [facility]   • Difficulty breathing    • ESRD (end stage renal disease) on dialysis ([facility]) 01/17/2018    Twan Fu   • Gasping for breath    • Heart burn    • Hypertension 01/17/2018    \"Controlled with medication\"   • Indigestion    • Lupus ([facility]ly [facility])    • Migraines 01/17/2018   • Painful breathing    • Palpitations    • Seizure (Formerly [facility]) 2013    from high blood pressure, reports 1 time event   • Shortness of breath    • Swelling of lower extremity    • Wheezing        Past Surgical History:   Procedure Laterality Date   • PB UPPER GI ENDOSCOPY,DIAGNOSIS N/A 3/19/2021    Procedure: GASTROSCOPY;  Surgeon: Waylon Mcqueen M.D.;  Location: [facility];  Service: Gastroenterology   • PB UPPER GI ENDOSCOPY,CTRL BLEED  3/19/2021    Procedure: GASTROSCOPY, WITH ARGON PLASMA COAGULATION;  Surgeon: Waylon Mcqueen M.D.;  Location: [facility];  Service: Gastroenterology   • PB UPPER GI ENDOSCOPY,DIAGNOSIS  3/5/2021    Procedure: GASTROSCOPY - W/HEMOSTASIS;  Surgeon: Ej Silva M.D.;  Location: [facility];  Service: Gastroenterology   • PB COLONOSCOPY,DIAGNOSTIC  1/8/2021    Procedure: COLONOSCOPY;  Surgeon: Herbert Contreras M.D.;  Location: [facility];  Service: Gastroenterology   • PB UPPER GI ENDOSCOPY,DIAGNOSIS  1/8/2021    Procedure: " "GASTROSCOPY;  Surgeon: Herbert Contreras M.D.;  Location: [facility];  Service: Gastroenterology   • PB UPPER GI ENDOSCOPY,CTRL BLEED  1/8/2021    Procedure: GASTROSCOPY, WITH ARGON PLASMA COAGULATION;  Surgeon: Herbert Contreras M.D.;  Location: [facility];  Service: Gastroenterology   • PB UPPER GI ENDOSCOPY,CTRL BLEED  11/12/2020    Procedure: GASTROSCOPY, WITH ARGON PLASMA COAGULATION;  Surgeon: Gadiel Whitney M.D.;  Location: [facility];  Service: Gastroenterology   • PB UPPER GI ENDOSCOPY,BIOPSY  11/12/2020    Procedure: GASTROSCOPY, WITH BIOPSY;  Surgeon: Gadiel Whitney M.D.;  Location: [facility];  Service: Gastroenterology   • GASTROSCOPY-ENDO  11/12/2020    Procedure: GASTROSCOPY;  Surgeon: Gadiel Whitney M.D.;  Location: [facility];  Service: Gastroenterology   • GASTROSCOPY-ENDO  9/18/2020    Procedure: GASTROSCOPY;  Surgeon: Gadiel Whitney M.D.;  Location: [facility];  Service: Gastroenterology   • GASTROSCOPY N/A 5/30/2020    Procedure: GASTROSCOPY;  Surgeon: Waylon Mcqueen M.D.;  Location: [facility];  Service: Gastroenterology   • GASTROSCOPY-ENDO  12/9/2019    Procedure: GASTROSCOPY;  Surgeon: Aaron Kam M.D.;  Location: [facility];  Service: Gastroenterology   • ANGIOPLASTY  01/17/2018    \"Right Arm AV-Fistulagram & Angioplastyx3\"   • ULI BY LAPAROSCOPY  4/5/2010    Performed by SYED MARTELL at SURGERY [facility]   • AV FISTULA CREATION Right    • OTHER      renal biopsy x 3   • OTHER      bone marrow biopsy       Allergies: Cephalexin, Clindamycin, Methylprednisolone, Metoprolol, and Norco [hydrocodone-acetaminophen]    Family History   Problem Relation Age of Onset   • Diabetes Paternal Grandmother        Vitals:    05/04/21 1150 05/04/21 1152   Height: 1.702 m (5' 7\")    Weight: 55.4 kg (122 lb 3 oz)    Weight % change since last entry.: 0 %    BP: 118/78    Pulse: 89    BMI (Calculated): " 19.14    O2 sat % room air:  (!) 88 %   O2 sat % on O2:  98 %   O2 Flow Rate (L/min):  2       Physical Examination  Physical Exam   Constitutional: She is oriented to person, place, and time. No distress.   HENT:   Head: Normocephalic and atraumatic.   Right Ear: External ear normal.   Left Ear: External ear normal.   Mouth/Throat: Oropharynx is clear and moist.   Eyes: Pupils are equal, round, and reactive to light. Conjunctivae and EOM are normal.   Neck: No JVD present. No tracheal deviation present. No thyromegaly present.   Cardiovascular: Normal rate and regular rhythm.   Pulmonary/Chest: No stridor. No respiratory distress. She has no wheezes. She has no rales. She exhibits no tenderness.   Musculoskeletal:         General: No tenderness, deformity or edema.      Cervical back: Normal range of motion and neck supple.      Comments: Fistula of left arm   Lymphadenopathy:     She has no cervical adenopathy.   Neurological: She is alert and oriented to person, place, and time. Gait normal.   Skin: Skin is warm and dry. No rash noted. She is not diaphoretic.   Hyperpigmented    Psychiatric: Mood, affect and judgment normal.              Ivette Eckert M.D., MD M[facility]  [facility]

## 2021-05-09 ENCOUNTER — APPOINTMENT (OUTPATIENT)
Dept: RADIOLOGY | Facility: MEDICAL CENTER | Age: 26
End: 2021-05-09
Attending: EMERGENCY MEDICINE
Payer: COMMERCIAL

## 2021-05-09 ENCOUNTER — HOSPITAL ENCOUNTER (EMERGENCY)
Facility: MEDICAL CENTER | Age: 26
End: 2021-05-09
Attending: EMERGENCY MEDICINE
Payer: COMMERCIAL

## 2021-05-09 VITALS
BODY MASS INDEX: 19.13 KG/M2 | OXYGEN SATURATION: 100 % | WEIGHT: 121.91 LBS | TEMPERATURE: 97.2 F | SYSTOLIC BLOOD PRESSURE: 180 MMHG | HEART RATE: 90 BPM | HEIGHT: 67 IN | RESPIRATION RATE: 22 BRPM | DIASTOLIC BLOOD PRESSURE: 124 MMHG

## 2021-05-09 DIAGNOSIS — N18.9 CHRONIC RENAL FAILURE, UNSPECIFIED CKD STAGE: ICD-10-CM

## 2021-05-09 DIAGNOSIS — I50.9 ACUTE CONGESTIVE HEART FAILURE, UNSPECIFIED HEART FAILURE TYPE (HCC): ICD-10-CM

## 2021-05-09 LAB
ALBUMIN SERPL BCP-MCNC: 3.5 G/DL (ref 3.2–4.9)
ALBUMIN/GLOB SERPL: 0.8 G/DL
ALP SERPL-CCNC: 94 U/L (ref 30–99)
ALT SERPL-CCNC: 7 U/L (ref 2–50)
ANION GAP SERPL CALC-SCNC: 11 MMOL/L (ref 7–16)
AST SERPL-CCNC: 13 U/L (ref 12–45)
BASOPHILS # BLD AUTO: 0.8 % (ref 0–1.8)
BASOPHILS # BLD: 0.05 K/UL (ref 0–0.12)
BILIRUB SERPL-MCNC: 0.5 MG/DL (ref 0.1–1.5)
BUN SERPL-MCNC: 39 MG/DL (ref 8–22)
CALCIUM SERPL-MCNC: 9.6 MG/DL (ref 8.4–10.2)
CHLORIDE SERPL-SCNC: 91 MMOL/L (ref 96–112)
CO2 SERPL-SCNC: 27 MMOL/L (ref 20–33)
COMMENT 1642: NORMAL
CREAT SERPL-MCNC: 5.11 MG/DL (ref 0.5–1.4)
EKG IMPRESSION: NORMAL
EOSINOPHIL # BLD AUTO: 0.06 K/UL (ref 0–0.51)
EOSINOPHIL NFR BLD: 0.9 % (ref 0–6.9)
ERYTHROCYTE [DISTWIDTH] IN BLOOD BY AUTOMATED COUNT: 65.7 FL (ref 35.9–50)
GLOBULIN SER CALC-MCNC: 4.6 G/DL (ref 1.9–3.5)
GLUCOSE SERPL-MCNC: 81 MG/DL (ref 65–99)
HCG SERPL QL: NEGATIVE
HCT VFR BLD AUTO: 33.1 % (ref 37–47)
HGB BLD-MCNC: 9.7 G/DL (ref 12–16)
IMM GRANULOCYTES # BLD AUTO: 0.03 K/UL (ref 0–0.11)
IMM GRANULOCYTES NFR BLD AUTO: 0.5 % (ref 0–0.9)
LYMPHOCYTES # BLD AUTO: 0.51 K/UL (ref 1–4.8)
LYMPHOCYTES NFR BLD: 7.8 % (ref 22–41)
MCH RBC QN AUTO: 27.4 PG (ref 27–33)
MCHC RBC AUTO-ENTMCNC: 29.3 G/DL (ref 33.6–35)
MCV RBC AUTO: 93.5 FL (ref 81.4–97.8)
MONOCYTES # BLD AUTO: 0.3 K/UL (ref 0–0.85)
MONOCYTES NFR BLD AUTO: 4.6 % (ref 0–13.4)
NEUTROPHILS # BLD AUTO: 5.63 K/UL (ref 2–7.15)
NEUTROPHILS NFR BLD: 85.4 % (ref 44–72)
NRBC # BLD AUTO: 0 K/UL
NRBC BLD-RTO: 0 /100 WBC
NT-PROBNP SERPL IA-MCNC: ABNORMAL PG/ML (ref 0–125)
PLATELET # BLD AUTO: 110 K/UL (ref 164–446)
PMV BLD AUTO: 10 FL (ref 9–12.9)
POTASSIUM SERPL-SCNC: 5 MMOL/L (ref 3.6–5.5)
PROT SERPL-MCNC: 8.1 G/DL (ref 6–8.2)
RBC # BLD AUTO: 3.54 M/UL (ref 4.2–5.4)
SODIUM SERPL-SCNC: 129 MMOL/L (ref 135–145)
TROPONIN T SERPL-MCNC: 710 NG/L (ref 6–19)
WBC # BLD AUTO: 6.6 K/UL (ref 4.8–10.8)

## 2021-05-09 PROCEDURE — 93005 ELECTROCARDIOGRAM TRACING: CPT | Performed by: EMERGENCY MEDICINE

## 2021-05-09 PROCEDURE — 83880 ASSAY OF NATRIURETIC PEPTIDE: CPT

## 2021-05-09 PROCEDURE — 80053 COMPREHEN METABOLIC PANEL: CPT

## 2021-05-09 PROCEDURE — 84484 ASSAY OF TROPONIN QUANT: CPT

## 2021-05-09 PROCEDURE — 99284 EMERGENCY DEPT VISIT MOD MDM: CPT

## 2021-05-09 PROCEDURE — 71045 X-RAY EXAM CHEST 1 VIEW: CPT

## 2021-05-09 PROCEDURE — 96372 THER/PROPH/DIAG INJ SC/IM: CPT

## 2021-05-09 PROCEDURE — 85025 COMPLETE CBC W/AUTO DIFF WBC: CPT

## 2021-05-09 PROCEDURE — 700111 HCHG RX REV CODE 636 W/ 250 OVERRIDE (IP): Performed by: EMERGENCY MEDICINE

## 2021-05-09 PROCEDURE — A9270 NON-COVERED ITEM OR SERVICE: HCPCS | Performed by: EMERGENCY MEDICINE

## 2021-05-09 PROCEDURE — 84703 CHORIONIC GONADOTROPIN ASSAY: CPT

## 2021-05-09 PROCEDURE — 700102 HCHG RX REV CODE 250 W/ 637 OVERRIDE(OP): Performed by: EMERGENCY MEDICINE

## 2021-05-09 RX ORDER — MORPHINE SULFATE 4 MG/ML
3 INJECTION, SOLUTION INTRAMUSCULAR; INTRAVENOUS ONCE
Status: COMPLETED | OUTPATIENT
Start: 2021-05-09 | End: 2021-05-09

## 2021-05-09 RX ORDER — CLONIDINE 0.1 MG/24H
1 PATCH, EXTENDED RELEASE TRANSDERMAL
Status: ON HOLD | COMMUNITY
End: 2021-05-19

## 2021-05-09 RX ORDER — MORPHINE SULFATE 4 MG/ML
3 INJECTION, SOLUTION INTRAMUSCULAR; INTRAVENOUS ONCE
Status: DISCONTINUED | OUTPATIENT
Start: 2021-05-09 | End: 2021-05-09

## 2021-05-09 RX ORDER — FUROSEMIDE 40 MG/1
40 TABLET ORAL ONCE
Status: COMPLETED | OUTPATIENT
Start: 2021-05-09 | End: 2021-05-09

## 2021-05-09 RX ORDER — ONDANSETRON 2 MG/ML
4 INJECTION INTRAMUSCULAR; INTRAVENOUS ONCE
Status: DISCONTINUED | OUTPATIENT
Start: 2021-05-09 | End: 2021-05-09

## 2021-05-09 RX ORDER — ONDANSETRON 4 MG/1
4 TABLET, ORALLY DISINTEGRATING ORAL ONCE
Status: COMPLETED | OUTPATIENT
Start: 2021-05-09 | End: 2021-05-09

## 2021-05-09 RX ADMIN — ONDANSETRON 4 MG: 4 TABLET, ORALLY DISINTEGRATING ORAL at 09:03

## 2021-05-09 RX ADMIN — FUROSEMIDE 40 MG: 40 TABLET ORAL at 09:50

## 2021-05-09 RX ADMIN — MORPHINE SULFATE 3 MG: 4 INJECTION INTRAVENOUS at 09:03

## 2021-05-09 ASSESSMENT — PAIN DESCRIPTION - PAIN TYPE: TYPE: ACUTE PAIN

## 2021-05-09 ASSESSMENT — FIBROSIS 4 INDEX: FIB4 SCORE: 0.9

## 2021-05-09 NOTE — ED NOTES
Tech from Lab called with critical result of Troponin 710, BNP >35,000, Creatine 5.11 at 0905. Critical lab result read back to 0905.   Dr. Brody notified of critical lab result at 0910.  Critical lab result read back by Dr. Brody.

## 2021-05-09 NOTE — ED TRIAGE NOTES
Chief Complaint   Patient presents with   • Chest Pain   3 hours of chest pain mid sternal, non radiating, no sweats. Pt is on home O2 at 6L this is a dialysis patient.

## 2021-05-09 NOTE — ED NOTES
Lab called for lab draw.  ERP updated unable to place US IV, 2 attempts and both blew.  Verbal order to change medications to PO.  Order placed.

## 2021-05-09 NOTE — ED NOTES
Patient discharged home in stable condition with mother  AVS provided with recommended follow up and home care instructions and education information  No prescriptions provided at this time  Patient and mother verbalized understanding  Ambulatory at time of discharge

## 2021-05-09 NOTE — ED NOTES
Med rec updated and complete  Allergies reviewed  Interviewed pt with mother at bedside with permission from pt  Pt reports no vitamins or OTC's.      No current facility-administered medications on file prior to encounter.     Current Outpatient Medications on File Prior to Encounter   Medication Sig Dispense Refill   • cloNIDine (CATAPRES) 0.1 MG/24HR PATCH WEEKLY patch Place 1 Patch on the skin every 7 days. On Wed     • bacitracin-polymyxin b (POLYSPORIN) 500-47932 UNIT/GM Ointment Apply 0.5 Inches to affected eye(s) every 12 hours for 7 days. (Patient taking differently: Apply 0.5 Inches to affected eye(s) every 12 hours. Pt started on 5/2/2021 for 7 day course) 3.5 g 0   • ondansetron (ZOFRAN ODT) 4 MG TABLET DISPERSIBLE Take 1 tablet by mouth every four hours as needed for Nausea (give PO if no IV route available). 30 tablet 0   • carvedilol (COREG) 25 MG Tab Take 1 tablet by mouth 2 times a day with meals. 60 tablet 0   • losartan (COZAAR) 25 MG Tab Take 1 tablet by mouth every day. (Patient taking differently: Take 25 mg by mouth every evening.) 30 tablet 0   • sucralfate (CARAFATE) 1 GM/10ML Suspension Take 10 mL by mouth 4 Times a Day,Before Meals and at Bedtime. (Patient taking differently: Take 1 g by mouth 2 times a day.) 1200 mL 1   • amLODIPine (NORVASC) 10 MG Tab Take 1 tablet by mouth every evening. 90 tablet 3   • predniSONE (DELTASONE) 5 MG Tab Take 5 mg by mouth every evening.     • mycophenolate (MYFORTIC) 180 MG EC tablet Take 1 Tab by mouth every evening. 30 Tab 1   • hydroxychloroquine (PLAQUENIL) 200 MG Tab Take 200 mg by mouth every evening.

## 2021-05-09 NOTE — ED PROVIDER NOTES
"ED Provider Note    CHIEF COMPLAINT  Chief Complaint   Patient presents with   • Chest Pain       [facility]  Lily JOJO Nicole is a 25 y.o. female who presents to the emergency room today with complaints of chest pain.  Patient has a history of chronic renal failure dialysis dependent secondary to lupus, history of hypertension.  She has dialysis on Monday, Wednesday and Friday.  She complains chest pain described as pressure which she has had for 24 hours getting worse she has had similar symptoms previously and was told that the cause was \"fluid in my lung\".  Pain is described as more of a pressure feeling over her chest wall.  No nausea no vomiting no fever chills no Covid exposure risks.  Noted patient speaking in full sentences with oxygenation of 100% on room air.    REVIEW OF SYSTEMS  See HPI for further details. All other systems are negative.     PAST MEDICAL HISTORY  Past Medical History:   Diagnosis Date   • Anemia 01/17/2018   • ESRD (end stage renal disease) on dialysis ([facility]) 01/17/2018    Twan Fu   • Hypertension 01/17/2018    \"Controlled with medication\"   • Migraines 01/17/2018   • Seizure ([facility]) 2013    from high blood pressure, reports 1 time event   • AVF (arteriovenous fistula) ([facility])     Right Arm   • Chest pain    • Chest tightness    • Daytime sleepiness    • Dialysis patient ([facility])      dialysis, M,W,F [facility]   • Difficulty breathing    • Gasping for breath    • Heart burn    • Indigestion    • Lupus ([facility])    • Painful breathing    • Palpitations    • Shortness of breath    • Swelling of lower extremity    • Wheezing        FAMILY HISTORY  [unfilled]    SOCIAL HISTORY  Social History     Socioeconomic History   • Marital status: Single     Spouse name: Not on file   • Number of children: Not on file   • Years of education: Not on file   • Highest education level: Not on file   Occupational History   • Not on file   Tobacco Use   • Smoking status: Never Smoker   • Smokeless tobacco: Never Used "   Substance and Sexual Activity   • Alcohol use: No   • Drug use: No   • Sexual activity: Not on file   Other Topics Concern   • Not on file   Social History Narrative   • Not on file     Social Determinants of Health     Financial Resource Strain: Low Risk    • Difficulty of Paying Living Expenses: Not hard at all   Food Insecurity: No Food Insecurity   • Worried About Running Out of Food in the Last Year: Never true   • Ran Out of Food in the Last Year: Never true   Transportation Needs: No Transportation Needs   • Lack of Transportation (Medical): No   • Lack of Transportation (Non-Medical): No   Physical Activity:    • Days of Exercise per Week:    • Minutes of Exercise per Session:    Stress:    • Feeling of Stress :    Social Connections:    • Frequency of Communication with Friends and Family:    • Frequency of Social Gatherings with Friends and Family:    • Attends Jewish Services:    • Active Member of Clubs or Organizations:    • Attends Club or Organization Meetings:    • Marital Status:    Intimate Partner Violence:    • Fear of Current or Ex-Partner:    • Emotionally Abused:    • Physically Abused:    • Sexually Abused:        SURGICAL HISTORY  Past Surgical History:   Procedure Laterality Date   • PB UPPER GI ENDOSCOPY,DIAGNOSIS N/A 3/19/2021    Procedure: GASTROSCOPY;  Surgeon: Waylon Mcqueen M.D.;  Location: [facility];  Service: Gastroenterology   • PB UPPER GI ENDOSCOPY,CTRL BLEED  3/19/2021    Procedure: GASTROSCOPY, WITH ARGON PLASMA COAGULATION;  Surgeon: Waylon Mcqueen M.D.;  Location: [facility];  Service: Gastroenterology   • PB UPPER GI ENDOSCOPY,DIAGNOSIS  3/5/2021    Procedure: GASTROSCOPY - W/HEMOSTASIS;  Surgeon: Ej Silva M.D.;  Location: [facility];  Service: Gastroenterology   • PB COLONOSCOPY,DIAGNOSTIC  1/8/2021    Procedure: COLONOSCOPY;  Surgeon: Herbert Contreras M.D.;  Location: [facility];  Service: Gastroenterology   • PB  "UPPER GI ENDOSCOPY,DIAGNOSIS  1/8/2021    Procedure: GASTROSCOPY;  Surgeon: Herbert Contreras M.D.;  Location: [facility];  Service: Gastroenterology   • PB UPPER GI ENDOSCOPY,CTRL BLEED  1/8/2021    Procedure: GASTROSCOPY, WITH ARGON PLASMA COAGULATION;  Surgeon: Herbert Contreras M.D.;  Location: [facility];  Service: Gastroenterology   • PB UPPER GI ENDOSCOPY,CTRL BLEED  11/12/2020    Procedure: GASTROSCOPY, WITH ARGON PLASMA COAGULATION;  Surgeon: Gadiel Whitney M.D.;  Location: [facility];  Service: Gastroenterology   • PB UPPER GI ENDOSCOPY,BIOPSY  11/12/2020    Procedure: GASTROSCOPY, WITH BIOPSY;  Surgeon: Gadiel Whitney M.D.;  Location: [facility];  Service: Gastroenterology   • GASTROSCOPY-ENDO  11/12/2020    Procedure: GASTROSCOPY;  Surgeon: Gadiel Whitney M.D.;  Location: [facility];  Service: Gastroenterology   • GASTROSCOPY-ENDO  9/18/2020    Procedure: GASTROSCOPY;  Surgeon: Gadiel Whitney M.D.;  Location: [facility];  Service: Gastroenterology   • GASTROSCOPY N/A 5/30/2020    Procedure: GASTROSCOPY;  Surgeon: Waylon Mcqueen M.D.;  Location: [facility];  Service: Gastroenterology   • GASTROSCOPY-ENDO  12/9/2019    Procedure: GASTROSCOPY;  Surgeon: Aaron Kam M.D.;  Location: [facility];  Service: Gastroenterology   • ANGIOPLASTY  01/17/2018    \"Right Arm AV-Fistulagram & Angioplastyx3\"   • ULI BY LAPAROSCOPY  4/5/2010    Performed by SYED MARTELL at SURGERY [facility]   • AV FISTULA CREATION Right    • OTHER      renal biopsy x 3   • OTHER      bone marrow biopsy       CURRENT MEDICATIONS  Home Medications     Reviewed by Shefali Ontiveros (Pharmacy Tech) on 05/09/21 at 0852  Med List Status: Complete   Medication Last Dose Status   amLODIPine (NORVASC) 10 MG Tab 5/8/2021 Active   bacitracin-polymyxin b (POLYSPORIN) 500-15464 UNIT/GM Ointment 5/8/2021 Active   carvedilol (COREG) 25 MG Tab " "5/8/2021 Active   cloNIDine (CATAPRES) 0.1 MG/24HR PATCH WEEKLY patch 5/5/2021 Active   hydroxychloroquine (PLAQUENIL) 200 MG Tab 5/8/2021 Active   losartan (COZAAR) 25 MG Tab 5/8/2021 Active   mycophenolate (MYFORTIC) 180 MG EC tablet 5/8/2021 Active   ondansetron (ZOFRAN ODT) 4 MG TABLET DISPERSIBLE >1 week Active   predniSONE (DELTASONE) 5 MG Tab 5/8/2021 Active   sucralfate (CARAFATE) 1 GM/10ML Suspension 5/8/2021 Active                ALLERGIES  Allergies   Allergen Reactions   • Cephalexin Rash     .   • Clindamycin Rash     .   • Methylprednisolone Unspecified     Anxious   • Metoprolol Rash     .   • Norco [Hydrocodone-Acetaminophen] Nausea       PHYSICAL EXAM  VITAL SIGNS: BP (!) 180/124   Pulse 90   Temp 36.2 °C (97.2 °F) (Temporal)   Resp (!) 22   Ht 1.702 m (5' 7\")   Wt 55.3 kg (121 lb 14.6 oz)   LMP 05/02/2021 (Exact Date)   SpO2 100%   BMI 19.09 kg/m²       Constitutional: Well developed, Well nourished, mild acute distress, Non-toxic appearance.   HENT: Normocephalic, Atraumatic, Bilateral external ears normal, Oropharynx moist, No oral exudates, Nose normal.   Eyes: PERRLA, EOMI, Conjunctiva normal, No discharge.   Neck: Normal range of motion, No tenderness, Supple, No stridor.   Lymphatic: No lymphadenopathy noted.   Cardiovascular: Normal heart rate, Normal rhythm, No murmurs, No rubs, No gallops.   Thorax & Lungs: Diminished breath sounds, minimal rales are noted in the bases, no respiratory distress, No wheezing, No chest tenderness.   Abdomen: Bowel sounds normal, Soft, No tenderness, No masses, No pulsatile masses.   Skin: Warm, Dry, No erythema, No rash.   Back: No tenderness, No CVA tenderness.     Extremities: Intact distal pulses, No edema, No tenderness, No cyanosis, No clubbing.   Musculoskeletal: Good range of motion in all major joints. No tenderness to palpation or major deformities noted.   Neurologic: Alert & oriented x 3, Normal motor function, Normal sensory function, No " focal deficits noted.   Psychiatric: Affect normal, Judgment normal, Mood normal.     EKG  Normal sinus rhythm at 90 bpm, no ST elevation or depression, there is evidence of left ventricular hypertrophy most likely from her history of hypertension, SD interval's are normal, no diagnostic Q waves.  This is a 12-lead EKG as compared to previous EKG dated 4/2021 is no interval change.    RADIOLOGY/PROCEDURES  DX-CHEST-PORTABLE (1 VIEW)   Final Result      1.  Mild diffuse interstitial and alveolar edema pattern. No lobar consolidation. Slightly improved appearance compared with 5/2/2021.      2.  No pleural effusion.      3.  Stable cardiomegaly.            COURSE & MEDICAL DECISION MAKING  Pertinent Labs & Imaging studies reviewed. (See chart for details)  Chest x-ray does show some interstitial edema she consistent with her history she does have a 35,000 BNP, troponin is elevated which is most likely secondary to this.  This is most likely a volume overload from need for dialysis she is scheduled for 5 AM for dialysis.  Currently she is maintaining normal oxygen and appears in no distress.  I discussed the case with Dr. Najjar and we both are comfortable with the patient continued for her dialysis at 5 AM which will take her fluid off and resolve her symptoms.  Her blood pressure has been intermittently high since she does have a known history of hypertension but has gone as low as 149/105.  She appears in no distress on examination.  She was told if for any reason anytime she develops increasing symptoms or worsening symptoms prior to her dialysis schedule 5 AM to return prompt emergency room.  Patient stated that she would like to go home and follow-up with dialysis as described above and was discharged.  Mother was at bedside also in agreement with treatment plan.    FINAL IMPRESSION  1.  Acute volume overload/CHF  2.  History of chronic renal failure dialysis dependent  3.   Hypertension by history  4.  Lupus by  history      Electronically signed by: Connor Brody D.O., 5/9/2021 2:00 PM

## 2021-05-12 ENCOUNTER — APPOINTMENT (OUTPATIENT)
Dept: RADIOLOGY | Facility: MEDICAL CENTER | Age: 26
End: 2021-05-12
Attending: EMERGENCY MEDICINE
Payer: COMMERCIAL

## 2021-05-12 ENCOUNTER — HOSPITAL ENCOUNTER (OUTPATIENT)
Facility: MEDICAL CENTER | Age: 26
End: 2021-05-13
Attending: EMERGENCY MEDICINE | Admitting: INTERNAL MEDICINE
Payer: COMMERCIAL

## 2021-05-12 LAB
ANION GAP SERPL CALC-SCNC: 12 MMOL/L (ref 7–16)
ANISOCYTOSIS BLD QL SMEAR: ABNORMAL
BASOPHILS # BLD AUTO: 0.4 % (ref 0–1.8)
BASOPHILS # BLD: 0.02 K/UL (ref 0–0.12)
BUN SERPL-MCNC: 43 MG/DL (ref 8–22)
CALCIUM SERPL-MCNC: 9.4 MG/DL (ref 8.4–10.2)
CHLORIDE SERPL-SCNC: 87 MMOL/L (ref 96–112)
CO2 SERPL-SCNC: 30 MMOL/L (ref 20–33)
COMMENT 1642: NORMAL
CREAT SERPL-MCNC: 5.47 MG/DL (ref 0.5–1.4)
EKG IMPRESSION: NORMAL
EOSINOPHIL # BLD AUTO: 0.06 K/UL (ref 0–0.51)
EOSINOPHIL NFR BLD: 1.1 % (ref 0–6.9)
ERYTHROCYTE [DISTWIDTH] IN BLOOD BY AUTOMATED COUNT: 65.6 FL (ref 35.9–50)
FLUAV RNA SPEC QL NAA+PROBE: NEGATIVE
FLUBV RNA SPEC QL NAA+PROBE: NEGATIVE
GLUCOSE SERPL-MCNC: 90 MG/DL (ref 65–99)
HCG SERPL QL: NEGATIVE
HCT VFR BLD AUTO: 32.7 % (ref 37–47)
HGB BLD-MCNC: 9.5 G/DL (ref 12–16)
HYPOCHROMIA BLD QL SMEAR: ABNORMAL
IMM GRANULOCYTES # BLD AUTO: 0.02 K/UL (ref 0–0.11)
IMM GRANULOCYTES NFR BLD AUTO: 0.4 % (ref 0–0.9)
LYMPHOCYTES # BLD AUTO: 0.39 K/UL (ref 1–4.8)
LYMPHOCYTES NFR BLD: 7.4 % (ref 22–41)
MACROCYTES BLD QL SMEAR: ABNORMAL
MCH RBC QN AUTO: 27.1 PG (ref 27–33)
MCHC RBC AUTO-ENTMCNC: 29.1 G/DL (ref 33.6–35)
MCV RBC AUTO: 93.2 FL (ref 81.4–97.8)
MICROCYTES BLD QL SMEAR: ABNORMAL
MONOCYTES # BLD AUTO: 0.26 K/UL (ref 0–0.85)
MONOCYTES NFR BLD AUTO: 5 % (ref 0–13.4)
NEUTROPHILS # BLD AUTO: 4.49 K/UL (ref 2–7.15)
NEUTROPHILS NFR BLD: 85.7 % (ref 44–72)
NRBC # BLD AUTO: 0 K/UL
NRBC BLD-RTO: 0 /100 WBC
NT-PROBNP SERPL IA-MCNC: ABNORMAL PG/ML (ref 0–125)
OVALOCYTES BLD QL SMEAR: NORMAL
PLATELET # BLD AUTO: 129 K/UL (ref 164–446)
PLATELET BLD QL SMEAR: NORMAL
PMV BLD AUTO: 10 FL (ref 9–12.9)
POIKILOCYTOSIS BLD QL SMEAR: NORMAL
POLYCHROMASIA BLD QL SMEAR: NORMAL
POTASSIUM SERPL-SCNC: 5.6 MMOL/L (ref 3.6–5.5)
PROCALCITONIN SERPL-MCNC: 2.02 NG/ML
RBC # BLD AUTO: 3.51 M/UL (ref 4.2–5.4)
RBC BLD AUTO: PRESENT
RSV RNA SPEC QL NAA+PROBE: NEGATIVE
SARS-COV-2 RNA RESP QL NAA+PROBE: NOTDETECTED
SODIUM SERPL-SCNC: 129 MMOL/L (ref 135–145)
SPECIMEN SOURCE: NORMAL
TROPONIN T SERPL-MCNC: 702 NG/L (ref 6–19)
WBC # BLD AUTO: 5.2 K/UL (ref 4.8–10.8)

## 2021-05-12 PROCEDURE — 99245 OFF/OP CONSLTJ NEW/EST HI 55: CPT | Performed by: INTERNAL MEDICINE

## 2021-05-12 PROCEDURE — 71045 X-RAY EXAM CHEST 1 VIEW: CPT

## 2021-05-12 PROCEDURE — 36415 COLL VENOUS BLD VENIPUNCTURE: CPT

## 2021-05-12 PROCEDURE — C9803 HOPD COVID-19 SPEC COLLECT: HCPCS | Performed by: EMERGENCY MEDICINE

## 2021-05-12 PROCEDURE — 96375 TX/PRO/DX INJ NEW DRUG ADDON: CPT

## 2021-05-12 PROCEDURE — A9270 NON-COVERED ITEM OR SERVICE: HCPCS | Performed by: INTERNAL MEDICINE

## 2021-05-12 PROCEDURE — 96374 THER/PROPH/DIAG INJ IV PUSH: CPT

## 2021-05-12 PROCEDURE — 84703 CHORIONIC GONADOTROPIN ASSAY: CPT

## 2021-05-12 PROCEDURE — G0378 HOSPITAL OBSERVATION PER HR: HCPCS

## 2021-05-12 PROCEDURE — 84484 ASSAY OF TROPONIN QUANT: CPT

## 2021-05-12 PROCEDURE — 700111 HCHG RX REV CODE 636 W/ 250 OVERRIDE (IP): Performed by: INTERNAL MEDICINE

## 2021-05-12 PROCEDURE — 99220 PR INITIAL OBSERVATION CARE,LEVL III: CPT | Performed by: INTERNAL MEDICINE

## 2021-05-12 PROCEDURE — 90935 HEMODIALYSIS ONE EVALUATION: CPT

## 2021-05-12 PROCEDURE — 700102 HCHG RX REV CODE 250 W/ 637 OVERRIDE(OP): Performed by: INTERNAL MEDICINE

## 2021-05-12 PROCEDURE — 0241U HCHG SARS-COV-2 COVID-19 NFCT DS RESP RNA 4 TRGT MIC: CPT

## 2021-05-12 PROCEDURE — 93005 ELECTROCARDIOGRAM TRACING: CPT | Performed by: EMERGENCY MEDICINE

## 2021-05-12 PROCEDURE — 700111 HCHG RX REV CODE 636 W/ 250 OVERRIDE (IP): Performed by: STUDENT IN AN ORGANIZED HEALTH CARE EDUCATION/TRAINING PROGRAM

## 2021-05-12 PROCEDURE — 85025 COMPLETE CBC W/AUTO DIFF WBC: CPT

## 2021-05-12 PROCEDURE — 96376 TX/PRO/DX INJ SAME DRUG ADON: CPT

## 2021-05-12 PROCEDURE — 83880 ASSAY OF NATRIURETIC PEPTIDE: CPT

## 2021-05-12 PROCEDURE — 84145 PROCALCITONIN (PCT): CPT

## 2021-05-12 PROCEDURE — 700102 HCHG RX REV CODE 250 W/ 637 OVERRIDE(OP): Performed by: STUDENT IN AN ORGANIZED HEALTH CARE EDUCATION/TRAINING PROGRAM

## 2021-05-12 PROCEDURE — A9270 NON-COVERED ITEM OR SERVICE: HCPCS | Performed by: STUDENT IN AN ORGANIZED HEALTH CARE EDUCATION/TRAINING PROGRAM

## 2021-05-12 PROCEDURE — 700111 HCHG RX REV CODE 636 W/ 250 OVERRIDE (IP): Performed by: EMERGENCY MEDICINE

## 2021-05-12 PROCEDURE — A9270 NON-COVERED ITEM OR SERVICE: HCPCS | Performed by: HOSPITALIST

## 2021-05-12 PROCEDURE — 99285 EMERGENCY DEPT VISIT HI MDM: CPT

## 2021-05-12 PROCEDURE — 700102 HCHG RX REV CODE 250 W/ 637 OVERRIDE(OP): Performed by: HOSPITALIST

## 2021-05-12 PROCEDURE — 80048 BASIC METABOLIC PNL TOTAL CA: CPT

## 2021-05-12 RX ORDER — ONDANSETRON 4 MG/1
4 TABLET, ORALLY DISINTEGRATING ORAL EVERY 4 HOURS PRN
Status: DISCONTINUED | OUTPATIENT
Start: 2021-05-12 | End: 2021-05-13 | Stop reason: HOSPADM

## 2021-05-12 RX ORDER — PREDNISONE 5 MG/1
5 TABLET ORAL EVERY EVENING
Status: DISCONTINUED | OUTPATIENT
Start: 2021-05-12 | End: 2021-05-13 | Stop reason: HOSPADM

## 2021-05-12 RX ORDER — HYDROXYCHLOROQUINE SULFATE 200 MG/1
200 TABLET, FILM COATED ORAL EVERY EVENING
Status: DISCONTINUED | OUTPATIENT
Start: 2021-05-12 | End: 2021-05-13 | Stop reason: HOSPADM

## 2021-05-12 RX ORDER — LORAZEPAM 0.5 MG/1
0.5 TABLET ORAL EVERY 6 HOURS PRN
Status: DISPENSED | OUTPATIENT
Start: 2021-05-12 | End: 2021-05-13

## 2021-05-12 RX ORDER — AMOXICILLIN 250 MG
2 CAPSULE ORAL 2 TIMES DAILY
Status: DISCONTINUED | OUTPATIENT
Start: 2021-05-12 | End: 2021-05-13 | Stop reason: HOSPADM

## 2021-05-12 RX ORDER — PROMETHAZINE HYDROCHLORIDE 25 MG/1
12.5-25 SUPPOSITORY RECTAL EVERY 4 HOURS PRN
Status: DISCONTINUED | OUTPATIENT
Start: 2021-05-12 | End: 2021-05-13 | Stop reason: HOSPADM

## 2021-05-12 RX ORDER — ONDANSETRON 2 MG/ML
4 INJECTION INTRAMUSCULAR; INTRAVENOUS EVERY 4 HOURS PRN
Status: DISCONTINUED | OUTPATIENT
Start: 2021-05-12 | End: 2021-05-13 | Stop reason: HOSPADM

## 2021-05-12 RX ORDER — ONDANSETRON 2 MG/ML
4 INJECTION INTRAMUSCULAR; INTRAVENOUS ONCE
Status: COMPLETED | OUTPATIENT
Start: 2021-05-12 | End: 2021-05-12

## 2021-05-12 RX ORDER — MYCOPHENOLIC ACID 180 MG/1
180 TABLET, DELAYED RELEASE ORAL EVERY EVENING
Status: DISCONTINUED | OUTPATIENT
Start: 2021-05-12 | End: 2021-05-12

## 2021-05-12 RX ORDER — LABETALOL HYDROCHLORIDE 5 MG/ML
10 INJECTION, SOLUTION INTRAVENOUS EVERY 4 HOURS PRN
Status: DISCONTINUED | OUTPATIENT
Start: 2021-05-12 | End: 2021-05-13 | Stop reason: HOSPADM

## 2021-05-12 RX ORDER — LOSARTAN POTASSIUM 25 MG/1
25 TABLET ORAL EVERY EVENING
Status: DISCONTINUED | OUTPATIENT
Start: 2021-05-12 | End: 2021-05-13 | Stop reason: HOSPADM

## 2021-05-12 RX ORDER — HEPARIN SODIUM 5000 [USP'U]/ML
5000 INJECTION, SOLUTION INTRAVENOUS; SUBCUTANEOUS EVERY 8 HOURS
Status: DISCONTINUED | OUTPATIENT
Start: 2021-05-12 | End: 2021-05-13 | Stop reason: HOSPADM

## 2021-05-12 RX ORDER — BISACODYL 10 MG
10 SUPPOSITORY, RECTAL RECTAL
Status: DISCONTINUED | OUTPATIENT
Start: 2021-05-12 | End: 2021-05-13 | Stop reason: HOSPADM

## 2021-05-12 RX ORDER — PROMETHAZINE HYDROCHLORIDE 25 MG/1
12.5-25 TABLET ORAL EVERY 4 HOURS PRN
Status: DISCONTINUED | OUTPATIENT
Start: 2021-05-12 | End: 2021-05-13 | Stop reason: HOSPADM

## 2021-05-12 RX ORDER — ALUMINA, MAGNESIA, AND SIMETHICONE 2400; 2400; 240 MG/30ML; MG/30ML; MG/30ML
10 SUSPENSION ORAL ONCE
Status: DISPENSED | OUTPATIENT
Start: 2021-05-12 | End: 2021-05-13

## 2021-05-12 RX ORDER — CLONIDINE 0.1 MG/24H
1 PATCH, EXTENDED RELEASE TRANSDERMAL
Status: DISCONTINUED | OUTPATIENT
Start: 2021-05-12 | End: 2021-05-13 | Stop reason: HOSPADM

## 2021-05-12 RX ORDER — HYDROMORPHONE HYDROCHLORIDE 1 MG/ML
0.5 INJECTION, SOLUTION INTRAMUSCULAR; INTRAVENOUS; SUBCUTANEOUS
Status: DISCONTINUED | OUTPATIENT
Start: 2021-05-12 | End: 2021-05-13 | Stop reason: HOSPADM

## 2021-05-12 RX ORDER — SUCRALFATE ORAL 1 G/10ML
1 SUSPENSION ORAL 2 TIMES DAILY
Status: DISCONTINUED | OUTPATIENT
Start: 2021-05-12 | End: 2021-05-13 | Stop reason: HOSPADM

## 2021-05-12 RX ORDER — MYCOPHENOLATE MOFETIL 250 MG/1
250 CAPSULE ORAL EVERY EVENING
Status: DISCONTINUED | OUTPATIENT
Start: 2021-05-12 | End: 2021-05-13 | Stop reason: HOSPADM

## 2021-05-12 RX ORDER — LORAZEPAM 2 MG/ML
0.5 INJECTION INTRAMUSCULAR ONCE
Status: COMPLETED | OUTPATIENT
Start: 2021-05-12 | End: 2021-05-12

## 2021-05-12 RX ORDER — ACETAMINOPHEN 325 MG/1
650 TABLET ORAL EVERY 6 HOURS PRN
Status: DISCONTINUED | OUTPATIENT
Start: 2021-05-12 | End: 2021-05-13 | Stop reason: HOSPADM

## 2021-05-12 RX ORDER — PROCHLORPERAZINE EDISYLATE 5 MG/ML
5-10 INJECTION INTRAMUSCULAR; INTRAVENOUS EVERY 4 HOURS PRN
Status: DISCONTINUED | OUTPATIENT
Start: 2021-05-12 | End: 2021-05-13 | Stop reason: HOSPADM

## 2021-05-12 RX ORDER — OXYCODONE HYDROCHLORIDE 5 MG/1
5 TABLET ORAL
Status: DISCONTINUED | OUTPATIENT
Start: 2021-05-12 | End: 2021-05-13 | Stop reason: HOSPADM

## 2021-05-12 RX ORDER — POLYETHYLENE GLYCOL 3350 17 G/17G
1 POWDER, FOR SOLUTION ORAL
Status: DISCONTINUED | OUTPATIENT
Start: 2021-05-12 | End: 2021-05-13 | Stop reason: HOSPADM

## 2021-05-12 RX ORDER — AMLODIPINE BESYLATE 5 MG/1
10 TABLET ORAL EVERY EVENING
Status: DISCONTINUED | OUTPATIENT
Start: 2021-05-12 | End: 2021-05-13 | Stop reason: HOSPADM

## 2021-05-12 RX ORDER — CALCIUM CARBONATE 500 MG/1
500 TABLET, CHEWABLE ORAL
Status: COMPLETED | OUTPATIENT
Start: 2021-05-12 | End: 2021-05-12

## 2021-05-12 RX ORDER — CARVEDILOL 25 MG/1
25 TABLET ORAL 2 TIMES DAILY WITH MEALS
Status: DISCONTINUED | OUTPATIENT
Start: 2021-05-12 | End: 2021-05-13 | Stop reason: HOSPADM

## 2021-05-12 RX ORDER — OXYCODONE HYDROCHLORIDE 10 MG/1
10 TABLET ORAL
Status: DISCONTINUED | OUTPATIENT
Start: 2021-05-12 | End: 2021-05-13 | Stop reason: HOSPADM

## 2021-05-12 RX ADMIN — EPOETIN ALFA-EPBX 10000 UNITS: 10000 INJECTION, SOLUTION INTRAVENOUS; SUBCUTANEOUS at 14:51

## 2021-05-12 RX ADMIN — CARVEDILOL 25 MG: 25 TABLET, FILM COATED ORAL at 17:55

## 2021-05-12 RX ADMIN — LOSARTAN POTASSIUM 25 MG: 25 TABLET, FILM COATED ORAL at 17:54

## 2021-05-12 RX ADMIN — LORAZEPAM 0.5 MG: 0.5 TABLET ORAL at 21:02

## 2021-05-12 RX ADMIN — OXYCODONE HYDROCHLORIDE 10 MG: 10 TABLET ORAL at 21:03

## 2021-05-12 RX ADMIN — HYDROMORPHONE HYDROCHLORIDE 0.5 MG: 1 INJECTION, SOLUTION INTRAMUSCULAR; INTRAVENOUS; SUBCUTANEOUS at 18:06

## 2021-05-12 RX ADMIN — FENTANYL CITRATE 50 MCG: 50 INJECTION, SOLUTION INTRAMUSCULAR; INTRAVENOUS at 05:49

## 2021-05-12 RX ADMIN — CARVEDILOL 25 MG: 25 TABLET, FILM COATED ORAL at 10:01

## 2021-05-12 RX ADMIN — PROCHLORPERAZINE EDISYLATE 5 MG: 5 INJECTION INTRAMUSCULAR; INTRAVENOUS at 21:01

## 2021-05-12 RX ADMIN — ONDANSETRON 4 MG: 2 INJECTION INTRAMUSCULAR; INTRAVENOUS at 17:53

## 2021-05-12 RX ADMIN — CALCIUM CARBONATE (ANTACID) CHEW TAB 500 MG 500 MG: 500 CHEW TAB at 17:52

## 2021-05-12 RX ADMIN — MYCOPHENOLATE MOFETIL 250 MG: 250 CAPSULE ORAL at 17:51

## 2021-05-12 RX ADMIN — HYDROXYCHLOROQUINE SULFATE 200 MG: 200 TABLET, FILM COATED ORAL at 17:55

## 2021-05-12 RX ADMIN — ONDANSETRON 4 MG: 2 INJECTION INTRAMUSCULAR; INTRAVENOUS at 05:49

## 2021-05-12 RX ADMIN — LORAZEPAM 0.5 MG: 2 INJECTION INTRAMUSCULAR; INTRAVENOUS at 13:27

## 2021-05-12 RX ADMIN — AMLODIPINE BESYLATE 10 MG: 5 TABLET ORAL at 17:55

## 2021-05-12 RX ADMIN — PREDNISONE 5 MG: 5 TABLET ORAL at 17:55

## 2021-05-12 ASSESSMENT — ENCOUNTER SYMPTOMS
TINGLING: 0
PALPITATIONS: 0
CONSTIPATION: 0
FEVER: 0
VOMITING: 0
FALLS: 0
STRIDOR: 0
DIARRHEA: 0
WEAKNESS: 0
CHILLS: 0
SPUTUM PRODUCTION: 0
HEADACHES: 0
COUGH: 1
DEPRESSION: 0
ABDOMINAL PAIN: 0
LOSS OF CONSCIOUSNESS: 0
NAUSEA: 0
SHORTNESS OF BREATH: 1
MYALGIAS: 0
DIZZINESS: 0

## 2021-05-12 ASSESSMENT — COGNITIVE AND FUNCTIONAL STATUS - GENERAL
WALKING IN HOSPITAL ROOM: A LITTLE
SUGGESTED CMS G CODE MODIFIER MOBILITY: CJ
CLIMB 3 TO 5 STEPS WITH RAILING: A LITTLE
MOBILITY SCORE: 22
SUGGESTED CMS G CODE MODIFIER DAILY ACTIVITY: CH
DAILY ACTIVITIY SCORE: 24

## 2021-05-12 ASSESSMENT — FIBROSIS 4 INDEX
FIB4 SCORE: 0.88
FIB4 SCORE: 0.88

## 2021-05-12 ASSESSMENT — LIFESTYLE VARIABLES
ON A TYPICAL DAY WHEN YOU DRINK ALCOHOL HOW MANY DRINKS DO YOU HAVE: 0
EVER HAD A DRINK FIRST THING IN THE MORNING TO STEADY YOUR NERVES TO GET RID OF A HANGOVER: NO
HAVE YOU EVER FELT YOU SHOULD CUT DOWN ON YOUR DRINKING: NO
TOTAL SCORE: 0
TOTAL SCORE: 0
ALCOHOL_USE: NO
CONSUMPTION TOTAL: NEGATIVE
HAVE PEOPLE ANNOYED YOU BY CRITICIZING YOUR DRINKING: NO
HOW MANY TIMES IN THE PAST YEAR HAVE YOU HAD 5 OR MORE DRINKS IN A DAY: 0
EVER FELT BAD OR GUILTY ABOUT YOUR DRINKING: NO
AVERAGE NUMBER OF DAYS PER WEEK YOU HAVE A DRINK CONTAINING ALCOHOL: 0
TOTAL SCORE: 0

## 2021-05-12 ASSESSMENT — PAIN DESCRIPTION - PAIN TYPE
TYPE: ACUTE PAIN
TYPE: ACUTE PAIN
TYPE: CHRONIC PAIN

## 2021-05-12 NOTE — ASSESSMENT & PLAN NOTE
-Mild, no changes on telemetry  -Patient needs hemodialysis  -Monitor on telemetry in the meantime

## 2021-05-12 NOTE — ASSESSMENT & PLAN NOTE
-Due to pulmonary edema  -I will obtain a procalcitonin to rule out infiltrate however I think this is less likely  -Patient does need hemodialysis however does not need it emergently  -Monitor oxygen saturation

## 2021-05-12 NOTE — DISCHARGE PLANNING
ER CM met with pt during dialysis interview kept short. Well known. Pleasant young woman. She lives with parents 1 story home in [place]. PCP Dr Matthew. RX wileyt on pryamid. She goes to [facility] dialysis [facility] in [place]. She has ESRD and lupus. [facility] for blood. o2 at home Care Transition Team Assessment    Information Source  Orientation Level: Oriented X4  Information Given By: Patient  Informant's Name: codie  Who is responsible for making decisions for patient? : Patient         Elopement Risk  Legal Hold: No  Ambulatory or Self Mobile in Wheelchair: No-Not an Elopement Risk    Interdisciplinary Discharge Planning  Primary Care Physician: Dr Matthew  Lives with - Patient's Self Care Capacity: Parents  Support Systems: Family Member(s), Parent  Housing / Facility: [address]  Do You Take your Prescribed Medications Regularly: Yes  Able to Return to Previous ADL's: Yes  Mobility Issues: No  Prior Services: None  Assistance Needed: Yes  Durable Medical Equipment: Home Oxygen              Finances  Prescription Coverage: Yes ([employer])                   Domestic Abuse  Have you ever been the victim of abuse or violence?: No

## 2021-05-12 NOTE — DISCHARGE PLANNING
Outpatient Dialysis Note     Confirmed patient is active at:     [address] [address]     Schedule: Monday, Wednesday, Friday   Time: 06:00am      Patient is seen by Dr. Trent in HD clinic.     Spoke with Leila at facility who confirmed.     Forwarded records for review.     Mary Han- Dialysis Coordinator # [phone]  [facility]

## 2021-05-12 NOTE — ED NOTES
Med rec updated and complete on 5/9/2021  Allergies reviewed  Asked pt last time she took her medications.  Pt reports no vitamins   Pt reports no antibiotics in the last 2 weeks     No current facility-administered medications on file prior to encounter.     Current Outpatient Medications on File Prior to Encounter   Medication Sig Dispense Refill   • cloNIDine (CATAPRES) 0.1 MG/24HR PATCH WEEKLY patch Place 1 Patch on the skin every 7 days. On Wed     • ondansetron (ZOFRAN ODT) 4 MG TABLET DISPERSIBLE Take 1 tablet by mouth every four hours as needed for Nausea (give PO if no IV route available). 30 tablet 0   • carvedilol (COREG) 25 MG Tab Take 1 tablet by mouth 2 times a day with meals. 60 tablet 0   • losartan (COZAAR) 25 MG Tab Take 1 tablet by mouth every day. (Patient taking differently: Take 25 mg by mouth every evening.) 30 tablet 0   • sucralfate (CARAFATE) 1 GM/10ML Suspension Take 10 mL by mouth 4 Times a Day,Before Meals and at Bedtime. (Patient taking differently: Take 1 g by mouth 2 times a day.) 1200 mL 1   • amLODIPine (NORVASC) 10 MG Tab Take 1 tablet by mouth every evening. 90 tablet 3   • predniSONE (DELTASONE) 5 MG Tab Take 5 mg by mouth every evening.     • mycophenolate (MYFORTIC) 180 MG EC tablet Take 1 Tab by mouth every evening. 30 Tab 1   • hydroxychloroquine (PLAQUENIL) 200 MG Tab Take 200 mg by mouth every evening.

## 2021-05-12 NOTE — CONSULTS
DATE OF SERVICE:  05/12/2021     NEPHROLOGY CONSULTATION     REQUESTING PHYSICIAN:  Eric Pierre MD     REASON FOR CONSULTATION:  To evaluate and provide dialysis for patient with   end-stage renal disease.     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    Receiving her dialysis treatments on Monday, Wednesday, and Friday in [facility].  Last dialysis completed on Monday without   complications.  She missed dialysis today as she woke up with severe shortness   of breath and cough.  Currently undergoing dialysis without complications,   feels better.  No fever or chills.  No sick contact.     REVIEW OF SYSTEMS:  GENERAL:  No fever or chills, no fatigue.  HEENT:  No congestion, no nosebleeds, no sore throat.  RESPIRATORY:  Cough and shortness of breath. No sputum production.  No   wheezes.  CARDIOVASCULAR:  No chest pain, palpitations, no leg swelling.  GASTROINTESTINAL:  No abdominal pain. No nausea, vomiting, diarrhea.  GENITOURINARY:  Making very little of urine.  MUSCULOSKELETAL:  No myalgias or arthralgias, no back pain.  NEUROLOGIC:  No dizziness, tingling.  No weakness or headaches.  All other systems reviewed, all negative.     PAST MEDICAL HISTORY:  End-stage renal disease on hemodialysis, systemic lupus   erythematosus, anemia, GI bleeding, heartburn, hypertension, palpitations,   heart disease.     PAST SURGICAL HISTORY:  Cholecystectomy, AV fistula creation, endoscopy,   colonoscopy.     FAMILY HISTORY:  Positive for diabetes mellitus in her grandmother.     SOCIAL HISTORY:   No tobacco, alcohol or drug use.     ALLERGIES:  ALLERGIC TO CEPHALEXIN, CLINDAMYCIN, METHYLPREDNISOLONE,   METOPROLOL, NORCO.     OUTPATIENT MEDICATIONS:  Reviewed.     PHYSICAL EXAMINATION:  VITAL SIGNS:  Blood pressure 193/130,  pulse 114.  GENERAL APPEARANCE:  Well-developed, well-nourished female in no acute   distress.  HEENT:  Normocephalic, atraumatic.  Pupils equal, round, reactive to light.    Extraocular movement intact.  Nares patent.  Oropharynx clear, moist mucosa,   no erythema or exudate.  NECK:  Supple, no lymphadenopathy, no thyromegaly appreciated.  LUNGS:  Clear to auscultation bilaterally.  No rales, wheezes or rhonchi.  HEART:  Regular rhythm, no rub or gallop.  ABDOMEN:  Soft, nontender, nondistended.  Bowel sounds present.  No palpable   mass.  EXTREMITIES:  No cyanosis, clubbing, no edema.  NEUROLOGICAL:  Alert, oriented x3, no focal deficit.  Cranial nerves 2-12   grossly intact.     LABORATORY DATA:  Laboratory results reviewed revealed hemoglobin level 9.5.    Sodium 129, potassium 5.6, BUN 43 and creatinine 5.47.     ASSESSMENT AND PLAN:  The patient is a 25-year-old female with end-stage renal   disease on hemodialysis, systemic lupus erythematosus relapsing admitted with   worsening shortness of breath.  1.  End-stage renal disease, scheduled for dialysis today.  Continue Monday,   Wednesday and Friday.  2.  Hypertension, uncontrolled, recently added clonidine patch, will be   adjusting dose.  3.  Electrolytes.  Hyperkalemia and hyponatremia, correcting with dialysis.  4.  Lupus, currently on Benlysta.  Follow up with rheumatology tomorrow.  5.  Anemia.  Hemoglobin level stable.  Continue Epogen with dialysis.     RECOMMENDATIONS:  Dialysis Monday, Wednesday and Friday.  To monitor   hemoglobin, basic metabolic panel.  Renal diet.  The patient needs to follow   up with rheumatology tomorrow,  pulmonary scheduled and cardiology. The   patient is waiting for a phone call for cardiology clinic to schedule   appointment.  Also recommended to repeat echocardiogram.  We will follow the   patient closely.     Thank you for the consult.  Above plan was discussed with Dr. Reyes  Gabby.        ______________________________  MD LUCIUS MOREIRA    DD:  05/12/2021 13:42  DT:  05/12/2021 16:07    Job#:  [id number]

## 2021-05-12 NOTE — PROGRESS NOTES
Attending Hospitalist today will be Dr. Pierre starting at 0700.  Renetta Jernigan RN  Hospitalist Service

## 2021-05-12 NOTE — H&P
Hospital Medicine History & Physical Note    Date of Service  5/12/2021    Primary Care Physician  Ella Matthew M.D.    Consultants  None however will need nephrology consult for hemodialysis    Code Status  Full Code    Chief Complaint  Chief Complaint   Patient presents with   • Shortness of Breath       History of Presenting Illness  25 y.o. female who presented 5/12/2021 with shortness of breath.  Patient is an end-stage renal dialysis patient with hemodialysis Monday, Wednesday and Friday.  She states she had a regular hemodialysis on Monday.  She was next scheduled early this morning however whenever she woke up she began having shortness of breath as well as a nonproductive cough.  Her normal dialysis is 6 this morning, she arrived approximately 50 minutes prior.  She is normally supposed to be on 4 L of oxygen, she increased it to 8.  I did discuss the case including labs and imaging with the ER physician.    Review of Systems  Review of Systems   Constitutional: Negative for chills, fever and malaise/fatigue.   HENT: Negative for congestion.    Respiratory: Positive for cough and shortness of breath. Negative for sputum production and stridor.    Cardiovascular: Negative for chest pain, palpitations and leg swelling.   Gastrointestinal: Negative for abdominal pain, constipation, diarrhea, nausea and vomiting.   Genitourinary: Negative for dysuria and urgency.   Musculoskeletal: Negative for falls and myalgias.   Neurological: Negative for dizziness, tingling, loss of consciousness, weakness and headaches.   Psychiatric/Behavioral: Negative for depression and suicidal ideas.   All other systems reviewed and are negative.      Past Medical History   has a past medical history of Anemia (01/17/2018), AVF (arteriovenous fistula) (formerly [facility]), Chest pain, Chest tightness, Daytime sleepiness, Dialysis patient (formerly [facility]), Difficulty breathing, ESRD (end stage renal disease) on dialysis (formerly [facility]) (01/17/2018), Gasping for  breath, Heart burn, Hypertension (01/17/2018), Indigestion, Lupus (HCC), Migraines (01/17/2018), Painful breathing, Palpitations, Seizure (HCC) (2013), Shortness of breath, Swelling of lower extremity, and Wheezing.    Surgical History   has a past surgical history that includes ronak by laparoscopy (4/5/2010); av fistula creation (Right); angioplasty (01/17/2018); other; other; gastroscopy-endo (12/9/2019); gastroscopy (N/A, 5/30/2020); gastroscopy-endo (9/18/2020); pr upper gi endoscopy,ctrl bleed (11/12/2020); pr upper gi endoscopy,biopsy (11/12/2020); gastroscopy-endo (11/12/2020); pr colonoscopy,diagnostic (1/8/2021); pr upper gi endoscopy,diagnosis (1/8/2021); pr upper gi endoscopy,ctrl bleed (1/8/2021); pr upper gi endoscopy,diagnosis (3/5/2021); pr upper gi endoscopy,diagnosis (N/A, 3/19/2021); and pr upper gi endoscopy,ctrl bleed (3/19/2021).     Family History  family history includes Diabetes in her paternal grandmother.     Social History   reports that she has never smoked. She has never used smokeless tobacco. She reports that she does not drink alcohol and does not use drugs.    Allergies  Allergies   Allergen Reactions   • Cephalexin Rash     .   • Clindamycin Rash     .   • Methylprednisolone Unspecified     Anxious   • Metoprolol Rash     .   • Norco [Hydrocodone-Acetaminophen] Nausea       Medications  Prior to Admission Medications   Prescriptions Last Dose Informant Patient Reported? Taking?   amLODIPine (NORVASC) 10 MG Tab  Patient No No   Sig: Take 1 tablet by mouth every evening.   carvedilol (COREG) 25 MG Tab  Patient No No   Sig: Take 1 tablet by mouth 2 times a day with meals.   cloNIDine (CATAPRES) 0.1 MG/24HR PATCH WEEKLY patch  Patient Yes No   Sig: Place 1 Patch on the skin every 7 days. On Wed   hydroxychloroquine (PLAQUENIL) 200 MG Tab  Patient Yes No   Sig: Take 200 mg by mouth every evening.   losartan (COZAAR) 25 MG Tab  Patient No No   Sig: Take 1 tablet by mouth every day.    Patient taking differently: Take 25 mg by mouth every evening.   mycophenolate (MYFORTIC) 180 MG EC tablet  Patient No No   Sig: Take 1 Tab by mouth every evening.   ondansetron (ZOFRAN ODT) 4 MG TABLET DISPERSIBLE  Patient No No   Sig: Take 1 tablet by mouth every four hours as needed for Nausea (give PO if no IV route available).   predniSONE (DELTASONE) 5 MG Tab  Patient Yes No   Sig: Take 5 mg by mouth every evening.   sucralfate (CARAFATE) 1 GM/10ML Suspension  Patient No No   Sig: Take 10 mL by mouth 4 Times a Day,Before Meals and at Bedtime.   Patient taking differently: Take 1 g by mouth 2 times a day.      Facility-Administered Medications: None       Physical Exam  Temp:  [36.6 °C (97.8 °F)] 36.6 °C (97.8 °F)  Pulse:  [] 114  Resp:  [18-20] 20  BP: (193)/(134) 193/134  SpO2:  [98 %-100 %] 98 %    Physical Exam  Vitals and nursing note reviewed.   Constitutional:       General: She is not in acute distress.     Appearance: She is well-developed. She is ill-appearing. She is not diaphoretic.   HENT:      Head: Normocephalic and atraumatic.      Right Ear: External ear normal.      Left Ear: External ear normal.      Nose: Nose normal. No congestion or rhinorrhea.      Mouth/Throat:      Mouth: Mucous membranes are moist.      Pharynx: No oropharyngeal exudate.   Eyes:      General:         Right eye: No discharge.         Left eye: No discharge.      Extraocular Movements: Extraocular movements intact.   Neck:      Trachea: No tracheal deviation.   Cardiovascular:      Rate and Rhythm: Normal rate and regular rhythm.      Heart sounds: No murmur heard.   No friction rub. No gallop.    Pulmonary:      Effort: Pulmonary effort is normal. No respiratory distress.      Breath sounds: No stridor. Rales present. No wheezing.   Chest:      Chest wall: No tenderness.   Abdominal:      General: Bowel sounds are normal. There is no distension.      Palpations: Abdomen is soft.      Tenderness: There is no  abdominal tenderness.   Musculoskeletal:         General: No tenderness. Normal range of motion.      Cervical back: Normal range of motion and neck supple.      Right lower leg: No edema.      Left lower leg: No edema.   Lymphadenopathy:      Cervical: No cervical adenopathy.   Skin:     General: Skin is warm and dry.      Coloration: Skin is not jaundiced.      Findings: No erythema or rash.   Neurological:      General: No focal deficit present.      Mental Status: She is alert and oriented to person, place, and time.      Cranial Nerves: No cranial nerve deficit.   Psychiatric:         Mood and Affect: Mood normal.         Behavior: Behavior normal.         Thought Content: Thought content normal.         Judgment: Judgment normal.         Laboratory:  Recent Labs     05/09/21  0830 05/12/21  0510   WBC 6.6 5.2   RBC 3.54* 3.51*   HEMOGLOBIN 9.7* 9.5*   HEMATOCRIT 33.1* 32.7*   MCV 93.5 93.2   MCH 27.4 27.1   MCHC 29.3* 29.1*   RDW 65.7* 65.6*   PLATELETCT 110* 129*   MPV 10.0 10.0     Recent Labs     05/09/21  0830 05/12/21  0510   SODIUM 129* 129*   POTASSIUM 5.0 5.6*   CHLORIDE 91* 87*   CO2 27 30   GLUCOSE 81 90   BUN 39* 43*   CREATININE 5.11* 5.47*   CALCIUM 9.6 9.4     Recent Labs     05/09/21  0830 05/12/21  0510   ALTSGPT 7  --    ASTSGOT 13  --    ALKPHOSPHAT 94  --    TBILIRUBIN 0.5  --    GLUCOSE 81 90         Recent Labs     05/09/21  0830 05/12/21  0510   NTPROBNP >58896* >67679*         Recent Labs     05/09/21  0830 05/12/21  0510   TROPONINT 710* 702*       Imaging:  DX-CHEST-PORTABLE (1 VIEW)   Final Result         1.  Pulmonary edema and/or infiltrates are identified, which appear somewhat increased since the prior exam.   2.  Cardiomegaly            Assessment/Plan:  I anticipate this patient is appropriate for observation status at this time.    Acute on chronic respiratory failure with hypoxia (HCC)- (present on admission)  Assessment & Plan  -Due to pulmonary edema  -I will obtain a  procalcitonin to rule out infiltrate however I think this is less likely  -Patient does need hemodialysis however does not need it emergently  -Monitor oxygen saturation    Anemia due to chronic kidney disease- (present on admission)  Assessment & Plan  -No sign of gross bleeding  -Repeat CBC in the morning    Hyperkalemia- (present on admission)  Assessment & Plan  -Mild, no changes on telemetry  -Patient needs hemodialysis  -Monitor on telemetry in the meantime    Hyponatremia- (present on admission)  Assessment & Plan  -Due to fluid overload  -Needs hemodialysis    Lupus (HCC)- (present on admission)  Assessment & Plan  -Continue home meds    HTN (hypertension)- (present on admission)  Assessment & Plan  -Continue home amlodipine, Coreg, clonidine and losartan  -Start as needed labetalol  -Adjust as needed    ESRD (end stage renal disease) on dialysis (HCC)- (present on admission)  Assessment & Plan  -Needs hemodialysis for increased pulmonary edema as well as mild hyperkalemia  -Today is her scheduled hemodialysis day  -She will need a hemodialysis consultation but 1 has not been called yet

## 2021-05-12 NOTE — ASSESSMENT & PLAN NOTE
-Continue home amlodipine, Coreg, clonidine and losartan  -Start as needed labetalol  -Adjust as needed

## 2021-05-12 NOTE — PROGRESS NOTES
"Per notes, \"25 y.o. female who presented 5/12/2021 with shortness of breath.  Patient is an end-stage renal dialysis patient with hemodialysis Monday, Wednesday and Friday.  She states she had a regular hemodialysis on Monday.  She was next scheduled early this morning however whenever she woke up she began having shortness of breath as well as a nonproductive cough.  Her normal dialysis is 6 this morning, she arrived approximately 50 minutes prior.  She is normally supposed to be on 4 L of oxygen, she increased it to 8.  I did discuss the case including labs and imaging with the ER physician.\"  Patient was admitted with acute on chronic respiratory failure.  She was evaluated by nephrology and received hemodialysis per dialysis schedule on 5/12.  Additionally she was evaluated by pulmonology, and there is some concern for underlying infection, PCP, will plan for bronchoscopy tomorrow.  Patient was seen examined by me this morning at bedside, still having some respiratory distress, getting dialysis on exam the patient.  Breath sounds reduced bilaterally, physical exam otherwise unremarkable.  Otherwise I agree with the plan as detailed in history and physical.         "

## 2021-05-12 NOTE — ASSESSMENT & PLAN NOTE
-Needs hemodialysis for increased pulmonary edema as well as mild hyperkalemia  -Today is her scheduled hemodialysis day  -She will need a hemodialysis consultation but 1 has not been called yet

## 2021-05-12 NOTE — ED TRIAGE NOTES
Pt states was sleeping when felt sob. Walked to bathroom with O2 when stated sob and cp got worse. States cp is now 9/10. Due for HD today

## 2021-05-12 NOTE — PROGRESS NOTES
[facility] Services Progress Note     HD treatment ordered by Dr Trent x 3 hours.  Treatment Start time: 1211          End time: 1512       Net UF 2390 ml    Patient tolerated treatment except SBP dropped to 60-70's in last 30 mins of TX. Pt felt sick, nauseated and lightheaded. Symptoms and BP improve after UF off and 300 ml NS    All blood was returned. RUE AVF needle removed. Dry gauze dressing applied and changed without bleeding issue. + Bruit/Thrill pre-post Treatment. See paper flow sheet for details.     Report given to Krysten Paige RN.

## 2021-05-12 NOTE — ED PROVIDER NOTES
ED Provider Note    CHIEF COMPLAINT  Chief Complaint   Patient presents with   • Shortness of Breath       HPI  Lily JOJO Nicole is a 25 y.o. female who presents with a chief complaint of shortness of breath and chest pain that started this morning when she awoke to use the restroom.  She has a history of end-stage renal disease on M/W/F dialysis secondary to lupus and hypertension and is currently on the transplant list.  Her most recent dialysis was Monday and she had a full run.  She is supposed to be having dialysis at 6 AM this morning, 50 minutes from arrival in the ER.  She denies any cough or cold symptoms, fevers, abdominal pain, nausea, vomiting.  She is at 4 L supplemental oxygen at baseline and had to increase her oxygen to 8 L.    REVIEW OF SYSTEMS  See HPI for further details.  Chest pain.  Shortness of breath.  Increased oxygen requirement.  All other systems are negative.     PAST MEDICAL HISTORY   has a past medical history of Anemia (01/17/2018), AVF (arteriovenous fistula) ([facility]), Chest pain, Chest tightness, Daytime sleepiness, Dialysis patient ([facility]), Difficulty breathing, ESRD (end stage renal disease) on dialysis ([facility]) (01/17/2018), Gasping for breath, Heart burn, Hypertension (01/17/2018), Indigestion, Lupus ([facility]), Migraines (01/17/2018), Painful breathing, Palpitations, Seizure ([facility]) (2013), Shortness of breath, Swelling of lower extremity, and Wheezing.    SOCIAL HISTORY  Social History     Tobacco Use   • Smoking status: Never Smoker   • Smokeless tobacco: Never Used   Vaping Use   • Vaping Use: Never used   Substance and Sexual Activity   • Alcohol use: No   • Drug use: No   • Sexual activity: Not on file       SURGICAL HISTORY   has a past surgical history that includes ronak by laparoscopy (4/5/2010); av fistula creation (Right); angioplasty (01/17/2018); other; other; gastroscopy-endo (12/9/2019); gastroscopy (N/A, 5/30/2020); gastroscopy-endo (9/18/2020); upper gi endoscopy,ctrl  "bleed (11/12/2020); upper gi endoscopy,biopsy (11/12/2020); gastroscopy-endo (11/12/2020); colonoscopy,diagnostic (1/8/2021); upper gi endoscopy,diagnosis (1/8/2021); upper gi endoscopy,ctrl bleed (1/8/2021); upper gi endoscopy,diagnosis (3/5/2021); upper gi endoscopy,diagnosis (N/A, 3/19/2021); and upper gi endoscopy,ctrl bleed (3/19/2021).    CURRENT MEDICATIONS  Home Medications    **Home medications have not yet been reviewed for this encounter**         ALLERGIES  Allergies   Allergen Reactions   • Cephalexin Rash     .   • Clindamycin Rash     .   • Methylprednisolone Unspecified     Anxious   • Metoprolol Rash     .   • Norco [Hydrocodone-Acetaminophen] Nausea       PHYSICAL EXAM  VITAL SIGNS: BP (!) 193/134   Pulse (!) 114   Temp 36.6 °C (97.8 °F) (Temporal)   Resp 20   Ht 1.702 m (5' 7\")   LMP 05/02/2021 (Exact Date)   SpO2 98%   BMI 19.09 kg/m²    Pulse ox interpretation: I interpret this pulse ox as normal.  Constitutional: Alert in no apparent distress.  Chronically ill-appearing.  HENT: No signs of trauma, Bilateral external ears normal, Nose normal.  Moist mucous membranes.  Eyes: Pupils are equal and reactive, Conjunctiva normal, Non-icteric.   Neck: Normal range of motion, No tenderness, Supple, No stridor.   Lymphatic: No lymphadenopathy noted.   Cardiovascular: Regular rate and rhythm, no murmurs. Pulses symmetrical.  Thorax & Lungs: Normal breath sounds, No respiratory distress, No wheezing, No chest tenderness.   Abdomen: Bowel sounds normal, Soft, No tenderness, No masses, No pulsatile masses. No peritoneal signs.  Skin: Warm, Dry, No erythema, No rash.   Back: Normal alignment.  Extremities: Intact distal pulses, No edema, No tenderness, No cyanosis.  Fistula in right upper extremity with good bruit and thrill.  Musculoskeletal: Good range of motion in all major joints. No tenderness to palpation or major deformities noted.   Neurologic: Alert, Normal motor function, Normal sensory " function, No focal deficits noted.   Psychiatric: Affect normal, Judgment normal, Mood normal.     DIAGNOSTIC STUDIES / PROCEDURES    LABS  Results for orders placed or performed during the hospital encounter of 05/12/21   CBC WITH DIFFERENTIAL   Result Value Ref Range    WBC 5.2 4.8 - 10.8 K/uL    RBC 3.51 (L) 4.20 - 5.40 M/uL    Hemoglobin 9.5 (L) 12.0 - 16.0 g/dL    Hematocrit 32.7 (L) 37.0 - 47.0 %    MCV 93.2 81.4 - 97.8 fL    MCH 27.1 27.0 - 33.0 pg    MCHC 29.1 (L) 33.6 - 35.0 g/dL    RDW 65.6 (H) 35.9 - 50.0 fL    Platelet Count 129 (L) 164 - 446 K/uL    MPV 10.0 9.0 - 12.9 fL    Neutrophils-Polys 85.70 (H) 44.00 - 72.00 %    Lymphocytes 7.40 (L) 22.00 - 41.00 %    Monocytes 5.00 0.00 - 13.40 %    Eosinophils 1.10 0.00 - 6.90 %    Basophils 0.40 0.00 - 1.80 %    Immature Granulocytes 0.40 0.00 - 0.90 %    Nucleated RBC 0.00 /100 WBC    Neutrophils (Absolute) 4.49 2.00 - 7.15 K/uL    Lymphs (Absolute) 0.39 (L) 1.00 - 4.80 K/uL    Monos (Absolute) 0.26 0.00 - 0.85 K/uL    Eos (Absolute) 0.06 0.00 - 0.51 K/uL    Baso (Absolute) 0.02 0.00 - 0.12 K/uL    Immature Granulocytes (abs) 0.02 0.00 - 0.11 K/uL    NRBC (Absolute) 0.00 K/uL    Hypochromia 1+     Anisocytosis 2+ (A)     Macrocytosis 1+     Microcytosis 1+    Basic Metabolic Panel   Result Value Ref Range    Sodium 129 (L) 135 - 145 mmol/L    Potassium 5.6 (H) 3.6 - 5.5 mmol/L    Chloride 87 (L) 96 - 112 mmol/L    Co2 30 20 - 33 mmol/L    Glucose 90 65 - 99 mg/dL    Bun 43 (H) 8 - 22 mg/dL    Creatinine 5.47 (HH) 0.50 - 1.40 mg/dL    Calcium 9.4 8.4 - 10.2 mg/dL    Anion Gap 12.0 7.0 - 16.0   proBrain Natriuretic Peptide, NT   Result Value Ref Range    NT-proBNP >73384 (H) 0 - 125 pg/mL   TROPONIN   Result Value Ref Range    Troponin T 702 (H) 6 - 19 ng/L   HCG QUAL SERUM   Result Value Ref Range    Beta-Hcg Qualitative Serum Negative Negative   ESTIMATED GFR   Result Value Ref Range    GFR If  12 (A) >60 mL/min/1.73 m 2    GFR If Non   10 (A) >60 mL/min/1.73 m 2   PLATELET ESTIMATE   Result Value Ref Range    Plt Estimation Decreased    MORPHOLOGY   Result Value Ref Range    RBC Morphology Present     Polychromia 1+     Poikilocytosis 1+     Ovalocytes 1+    DIFFERENTIAL COMMENT   Result Value Ref Range    Comments-Diff see below    EKG   Result Value Ref Range    Report       [facility] Emergency Dept.    Test Date:  2021  Pt Name:    CASE WOODSON            Department: [facility]  MRN:        [id number]                      Room:       -ROOM 3  Gender:     Female                       Technician: RN  :        [id number]                   Requested By:SHANNON CORRAL  Order #:    [id number]                    Reading MD: Shannon Corral MD    Measurements  Intervals                                Axis  Rate:       108                          P:          56  OK:         141                          QRS:        -18  QRSD:       87                           T:          122  QT:         333  QTc:        447    Interpretive Statements  -------------------- Pediatric ECG interpretation --------------------  Sinus rhythm  Borderline prolonged OK interval  Left atrial enlargement  Nonspecific intraventricular conduction delay  Baseline wander in lead(s) V5  Compared to ECG 2021 07:02:30  Atrial abnormality now present  Intraventricu lar conduction delay now present  Sinus tachycardia no longer present  Left ventricular hypertrophy no longer present  Early repolarization no longer present  Electronically Signed On 2021 5:18:41 PDT by Shannon Corral MD       RADIOLOGY  CXR    COURSE & MEDICAL DECISION MAKING  Pertinent Labs & Imaging studies reviewed. (See chart for details)  Records obtained and reviewed: Patient was seen in this emergency department most recently 3 days ago for chest pain.  Noted that she has a history of chronic renal failure and is dialysis dependent secondary to lupus and  hypertension.  Typically undergoes dialysis on Monday, Wednesday, and Friday.  Chest x-ray revealed interstitial edema with a BNP greater than 35,000 and troponin elevated to 710.  She was noted to be maintaining normal oxygen saturation and was not in distress.  ERP discussed her case with the nephrologist and together they felt that she was safe for dialysis at 5 AM.  Blood pressure was intermittently high but did improve to 149/105.  She was discharged to follow-up for dialysis.    This is a 25-year-old female with a history of ESRD on M/W/F dialysis who is here with chest pain and shortness of breath.  Differential diagnosis includes, but is not limited to, fluid overload, ACS, pneumonia, leslie-/myocarditis, MSK.  Arrives hypertensive but afebrile with otherwise normal vital signs.  She has O2 sats in the high 90s on her baseline 4 L of supplemental oxygen.  Physical exam is largely unremarkable.    CBC without leukocytosis.  She does have a mild anemia but this is consistent with prior values and denies any melena or hematochezia to suggest active bleeding.  She does have mild hyperkalemia to 5.6 but no peak T waves on EKG.  Sodium is low at 129 and this is also consistent with prior.  The remainder of her metabolic panel is consistent with her ESRD.    Chest x-ray reveals pulmonary edema and/or infiltrates which appear increased since prior exam.  Patient does not have a cough or fever and no leukocytosis.  I think this is highly unlikely to represent infectious etiology and will defer antibiotics.  However, she will require hospitalization for dialysis that she has missed her dialysis today, has hyperkalemia, BNP that is elevated above 35,000, troponin greater than 700, and is tachycardic and hypertensive.    Patient was discussed with hospitalist and admitted in guarded condition.    FINAL IMPRESSION  1.  Fluid overload  2.  ESRD    Electronically signed by: Christopher Hussein M.D., 5/12/2021 5:02 AM

## 2021-05-12 NOTE — CONSULTS
Noted dictated.   #[id number].    Please keep NPO for bronchoscopy tomorrow. Discussed with hospitalist.

## 2021-05-13 ENCOUNTER — ANESTHESIA EVENT (OUTPATIENT)
Dept: SURGERY | Facility: MEDICAL CENTER | Age: 26
End: 2021-05-13
Payer: COMMERCIAL

## 2021-05-13 ENCOUNTER — APPOINTMENT (OUTPATIENT)
Dept: CARDIOLOGY | Facility: MEDICAL CENTER | Age: 26
End: 2021-05-13
Attending: INTERNAL MEDICINE
Payer: COMMERCIAL

## 2021-05-13 ENCOUNTER — APPOINTMENT (OUTPATIENT)
Dept: RADIOLOGY | Facility: MEDICAL CENTER | Age: 26
End: 2021-05-13
Attending: INTERNAL MEDICINE
Payer: COMMERCIAL

## 2021-05-13 ENCOUNTER — APPOINTMENT (OUTPATIENT)
Dept: ONCOLOGY | Facility: MEDICAL CENTER | Age: 26
End: 2021-05-13
Attending: INTERNAL MEDICINE
Payer: COMMERCIAL

## 2021-05-13 ENCOUNTER — ANESTHESIA (OUTPATIENT)
Dept: SURGERY | Facility: MEDICAL CENTER | Age: 26
End: 2021-05-13
Payer: COMMERCIAL

## 2021-05-13 VITALS
BODY MASS INDEX: 18.99 KG/M2 | OXYGEN SATURATION: 91 % | DIASTOLIC BLOOD PRESSURE: 99 MMHG | TEMPERATURE: 98.4 F | HEIGHT: 67 IN | RESPIRATION RATE: 14 BRPM | HEART RATE: 86 BPM | SYSTOLIC BLOOD PRESSURE: 144 MMHG | WEIGHT: 121 LBS

## 2021-05-13 PROBLEM — J96.21 ACUTE ON CHRONIC RESPIRATORY FAILURE WITH HYPOXIA (HCC): Status: RESOLVED | Noted: 2021-03-19 | Resolved: 2021-05-13

## 2021-05-13 LAB
ANION GAP SERPL CALC-SCNC: 9 MMOL/L (ref 7–16)
APPEARANCE FLD: NORMAL
BODY FLD TYPE: NORMAL
BUN SERPL-MCNC: 22 MG/DL (ref 8–22)
CALCIUM SERPL-MCNC: 8.8 MG/DL (ref 8.4–10.2)
CHLORIDE SERPL-SCNC: 92 MMOL/L (ref 96–112)
CO2 SERPL-SCNC: 31 MMOL/L (ref 20–33)
COLOR FLD: NORMAL
CREAT SERPL-MCNC: 3.53 MG/DL (ref 0.5–1.4)
ERYTHROCYTE [DISTWIDTH] IN BLOOD BY AUTOMATED COUNT: 65.5 FL (ref 35.9–50)
GLUCOSE SERPL-MCNC: 94 MG/DL (ref 65–99)
GRAM STN SPEC: NORMAL
HCT VFR BLD AUTO: 30.6 % (ref 37–47)
HGB BLD-MCNC: 9 G/DL (ref 12–16)
LV EJECT FRACT  99904: 55
LV EJECT FRACT MOD 2C 99903: 54.74
LV EJECT FRACT MOD 4C 99902: 57.82
LV EJECT FRACT MOD BP 99901: 55.89
LYMPHOCYTES NFR FLD: 18 %
MCH RBC QN AUTO: 27.2 PG (ref 27–33)
MCHC RBC AUTO-ENTMCNC: 29.4 G/DL (ref 33.6–35)
MCV RBC AUTO: 92.4 FL (ref 81.4–97.8)
MESOTHL CELL NFR FLD: 1 %
MONONUC CELLS NFR FLD: 15 %
NEUTROPHILS NFR FLD: 66 %
PLATELET # BLD AUTO: 104 K/UL (ref 164–446)
PMV BLD AUTO: 9.9 FL (ref 9–12.9)
POTASSIUM SERPL-SCNC: 5 MMOL/L (ref 3.6–5.5)
RBC # BLD AUTO: 3.31 M/UL (ref 4.2–5.4)
SIGNIFICANT IND 70042: NORMAL
SITE SITE: NORMAL
SODIUM SERPL-SCNC: 132 MMOL/L (ref 135–145)
SOURCE SOURCE: NORMAL
WBC # BLD AUTO: 3.3 K/UL (ref 4.8–10.8)

## 2021-05-13 PROCEDURE — 71045 X-RAY EXAM CHEST 1 VIEW: CPT

## 2021-05-13 PROCEDURE — 87206 SMEAR FLUORESCENT/ACID STAI: CPT

## 2021-05-13 PROCEDURE — 80048 BASIC METABOLIC PNL TOTAL CA: CPT

## 2021-05-13 PROCEDURE — G0378 HOSPITAL OBSERVATION PER HR: HCPCS

## 2021-05-13 PROCEDURE — 31623 DX BRONCHOSCOPE/BRUSH: CPT | Performed by: INTERNAL MEDICINE

## 2021-05-13 PROCEDURE — 93306 TTE W/DOPPLER COMPLETE: CPT | Mod: 26 | Performed by: INTERNAL MEDICINE

## 2021-05-13 PROCEDURE — 700111 HCHG RX REV CODE 636 W/ 250 OVERRIDE (IP): Performed by: INTERNAL MEDICINE

## 2021-05-13 PROCEDURE — 160035 HCHG PACU - 1ST 60 MINS PHASE I: Performed by: INTERNAL MEDICINE

## 2021-05-13 PROCEDURE — 700111 HCHG RX REV CODE 636 W/ 250 OVERRIDE (IP): Performed by: ANESTHESIOLOGY

## 2021-05-13 PROCEDURE — 160002 HCHG RECOVERY MINUTES (STAT): Performed by: INTERNAL MEDICINE

## 2021-05-13 PROCEDURE — 88112 CYTOPATH CELL ENHANCE TECH: CPT

## 2021-05-13 PROCEDURE — 93306 TTE W/DOPPLER COMPLETE: CPT

## 2021-05-13 PROCEDURE — 160009 HCHG ANES TIME/MIN: Performed by: INTERNAL MEDICINE

## 2021-05-13 PROCEDURE — 160036 HCHG PACU - EA ADDL 30 MINS PHASE I: Performed by: INTERNAL MEDICINE

## 2021-05-13 PROCEDURE — 96376 TX/PRO/DX INJ SAME DRUG ADON: CPT

## 2021-05-13 PROCEDURE — 89240 UNLISTED MISC PATH TEST: CPT

## 2021-05-13 PROCEDURE — 87116 MYCOBACTERIA CULTURE: CPT

## 2021-05-13 PROCEDURE — 160029 HCHG SURGERY MINUTES - 1ST 30 MINS LEVEL 4: Performed by: INTERNAL MEDICINE

## 2021-05-13 PROCEDURE — 88305 TISSUE EXAM BY PATHOLOGIST: CPT | Mod: 59

## 2021-05-13 PROCEDURE — 700101 HCHG RX REV CODE 250: Performed by: ANESTHESIOLOGY

## 2021-05-13 PROCEDURE — 85027 COMPLETE CBC AUTOMATED: CPT

## 2021-05-13 PROCEDURE — 31624 DX BRONCHOSCOPE/LAVAGE: CPT | Performed by: INTERNAL MEDICINE

## 2021-05-13 PROCEDURE — 99217 PR OBSERVATION CARE DISCHARGE: CPT | Performed by: INTERNAL MEDICINE

## 2021-05-13 PROCEDURE — 87015 SPECIMEN INFECT AGNT CONCNTJ: CPT

## 2021-05-13 PROCEDURE — 700105 HCHG RX REV CODE 258: Performed by: INTERNAL MEDICINE

## 2021-05-13 PROCEDURE — 87070 CULTURE OTHR SPECIMN AEROBIC: CPT

## 2021-05-13 PROCEDURE — 160048 HCHG OR STATISTICAL LEVEL 1-5: Performed by: INTERNAL MEDICINE

## 2021-05-13 PROCEDURE — 87205 SMEAR GRAM STAIN: CPT

## 2021-05-13 PROCEDURE — 87102 FUNGUS ISOLATION CULTURE: CPT

## 2021-05-13 PROCEDURE — 160041 HCHG SURGERY MINUTES - EA ADDL 1 MIN LEVEL 4: Performed by: INTERNAL MEDICINE

## 2021-05-13 RX ORDER — DIPHENHYDRAMINE HYDROCHLORIDE 50 MG/ML
12.5 INJECTION INTRAMUSCULAR; INTRAVENOUS
Status: DISCONTINUED | OUTPATIENT
Start: 2021-05-13 | End: 2021-05-13 | Stop reason: HOSPADM

## 2021-05-13 RX ORDER — SODIUM CHLORIDE 9 MG/ML
INJECTION, SOLUTION INTRAVENOUS ONCE
Status: COMPLETED | OUTPATIENT
Start: 2021-05-13 | End: 2021-05-13

## 2021-05-13 RX ORDER — ONDANSETRON 2 MG/ML
4 INJECTION INTRAMUSCULAR; INTRAVENOUS
Status: COMPLETED | OUTPATIENT
Start: 2021-05-13 | End: 2021-05-13

## 2021-05-13 RX ORDER — SODIUM CHLORIDE, SODIUM LACTATE, POTASSIUM CHLORIDE, CALCIUM CHLORIDE 600; 310; 30; 20 MG/100ML; MG/100ML; MG/100ML; MG/100ML
INJECTION, SOLUTION INTRAVENOUS CONTINUOUS
Status: DISCONTINUED | OUTPATIENT
Start: 2021-05-13 | End: 2021-05-13 | Stop reason: HOSPADM

## 2021-05-13 RX ORDER — OXYCODONE HCL 5 MG/5 ML
5 SOLUTION, ORAL ORAL
Status: DISCONTINUED | OUTPATIENT
Start: 2021-05-13 | End: 2021-05-13 | Stop reason: HOSPADM

## 2021-05-13 RX ORDER — DAPSONE 100 MG/1
100 TABLET ORAL DAILY
Qty: 30 TABLET | Refills: 0 | Status: SHIPPED | OUTPATIENT
Start: 2021-05-13 | End: 2021-06-12

## 2021-05-13 RX ORDER — HALOPERIDOL 5 MG/ML
1 INJECTION INTRAMUSCULAR
Status: DISCONTINUED | OUTPATIENT
Start: 2021-05-13 | End: 2021-05-13 | Stop reason: HOSPADM

## 2021-05-13 RX ORDER — DEXAMETHASONE SODIUM PHOSPHATE 4 MG/ML
INJECTION, SOLUTION INTRA-ARTICULAR; INTRALESIONAL; INTRAMUSCULAR; INTRAVENOUS; SOFT TISSUE PRN
Status: DISCONTINUED | OUTPATIENT
Start: 2021-05-13 | End: 2021-05-13 | Stop reason: SURG

## 2021-05-13 RX ORDER — OXYCODONE HCL 5 MG/5 ML
10 SOLUTION, ORAL ORAL
Status: DISCONTINUED | OUTPATIENT
Start: 2021-05-13 | End: 2021-05-13 | Stop reason: HOSPADM

## 2021-05-13 RX ADMIN — ONDANSETRON 4 MG: 2 INJECTION INTRAMUSCULAR; INTRAVENOUS at 14:04

## 2021-05-13 RX ADMIN — DEXAMETHASONE SODIUM PHOSPHATE 8 MG: 4 INJECTION, SOLUTION INTRAMUSCULAR; INTRAVENOUS at 14:04

## 2021-05-13 RX ADMIN — ONDANSETRON 4 MG: 2 INJECTION INTRAMUSCULAR; INTRAVENOUS at 14:43

## 2021-05-13 RX ADMIN — SODIUM CHLORIDE: 9 INJECTION, SOLUTION INTRAVENOUS at 13:51

## 2021-05-13 RX ADMIN — ROCURONIUM BROMIDE 25 MG: 10 INJECTION INTRAVENOUS at 14:03

## 2021-05-13 RX ADMIN — ONDANSETRON 4 MG: 2 INJECTION INTRAMUSCULAR; INTRAVENOUS at 09:37

## 2021-05-13 RX ADMIN — FENTANYL CITRATE 100 MCG: 50 INJECTION, SOLUTION INTRAMUSCULAR; INTRAVENOUS at 13:59

## 2021-05-13 RX ADMIN — PROPOFOL 200 MG: 10 INJECTION, EMULSION INTRAVENOUS at 13:55

## 2021-05-13 RX ADMIN — FENTANYL CITRATE 150 MCG: 50 INJECTION, SOLUTION INTRAMUSCULAR; INTRAVENOUS at 14:03

## 2021-05-13 ASSESSMENT — PAIN SCALES - GENERAL: PAIN_LEVEL: 2

## 2021-05-13 ASSESSMENT — PAIN DESCRIPTION - PAIN TYPE
TYPE: CHRONIC PAIN
TYPE: SURGICAL PAIN
TYPE: CHRONIC PAIN

## 2021-05-13 NOTE — PROCEDURES
Bronchoscopy with Endobronchial Biopsy/TBNA Procedure Note      Location: [facility] Endoscopy Suite    Date of Operation: 5/13/2021     Attending Physician Performing Procedure: William Spangler M.D.     Pre-op Diagnosis: Hypoxic Respiratory Failure     Post-op Diagnosis: Hypoxic Respiratory Failure    Anesthesia: General, administered by Dr Herring    Operation:   Diagnostic flexible fiberoptic bronchoscopy, with bronchoalveolar lavage, brushings and transbronchial biopsies.     Specimen:   - Transbronchial biopsies - Right Lower Lobe  - Brushings - RLL  - Bronchial Alveolar Lavage - RLL    Estimated Blood Loss: Minimal    Complications: None    Indications and History:    The patient is a 25 y.o. lady who has a past medical history of Lupus and presented with worsening respiratory failure. The risks, benefits, complications, treatment options and expected outcomes were discussed with the patient. The possibilities of reaction to medication, pulmonary aspiration, perforation of a viscus, bleeding, failure to diagnose a condition and creating a complication requiring transfusion or operation were discussed with the patient who freely signed the consent.    Description of Procedure:    The patient was seen in the Pre-op area and interval H&P was verified. The patient was taken to the endoscopy suite, identified as Lily Nicole and a Time Out was held and the above information confirmed. After the induction of general anesthesia, the patient was positioned supine and the bronchoscope was passed through the ET tube . The scope was then advanced into the trachea. Lidocaine 2% 4 ml was used topically on the rich. Careful inspection of the tracheal lumen was accomplished.     The scope was then advanced into the right main bronchus and then into the RUL, RML, and RLL bronchi and segmental bronchi. Similarly I did inspection bronchoscopy of the left side.     Trachea: normal bronchial  mucosa  Laura: normal bronchial mucosa  Right main bronchus: normal bronchial mucosa  Right upper lobe bronchus: normal bronchial mucosa  Right middle lobe bronchus: normal bronchial mucosa  Right lower lobe bronchus: normal bronchial mucosa  Left main bronchus: normal bronchial mucosa  Left upper lobe bronchus: normal bronchial mucosa  Left lower lobe bronchus: normal bronchial mucosa    Bronchoalveolar lavage was subsequently performed in the right lower lobe. Lavage specimen was turbid pink in gross appearance.    Endobronchial brushings were then performed in the RLL, prepared on slides and sent for cytology.    Transbronchial biopsies were then obtained in the RLL using conventional gator jaw forceps under direct fluoroscopic guidance.  Fluoroscopy was used to confirm no post-procedural pneumothorax.    The Patient was subsequently taken to the PACU in satisfactory condition.      William Spangler MD   Pulmonary and Critical Care Physician

## 2021-05-13 NOTE — ANESTHESIA PROCEDURE NOTES
Airway    Date/Time: 5/13/2021 1:53 PM  Performed by: Sidney Herring M.D.  Authorized by: Sidney Herring M.D.     Location:  OR  Urgency:  Elective  Difficult Airway: No    Indications for Airway Management:  Anesthesia      Spontaneous Ventilation: present    Sedation Level:  Deep  Preoxygenated: Yes    Patient Position:  Sniffing  MILS Maintained Throughout: No    Mask Difficulty Assessment:  0 - not attempted  Final Airway Type:  Endotracheal airway  Final Endotracheal Airway:  ETT  Cuffed: Yes    Technique Used for Successful ETT Placement:  Direct laryngoscopy    Blade Type:  Nathalia  Laryngoscope Blade/Videolaryngoscope Blade Size:  4  ETT Size (mm):  8.0  Placement Verified by: auscultation and capnometry    Cormack-Lehane Classification:  Grade I - full view of glottis  Number of Attempts at Approach:  1

## 2021-05-13 NOTE — PROGRESS NOTES
Pt admitted from ED with Shortness of Breath.   Patient received HD in ED.   Vitals are stable on 6L NC.   Pain 7/10, pt requested IV dilaudid at this time.  Pt will be NPO at midnight for bronchoscopy tomorrow.

## 2021-05-13 NOTE — PROGRESS NOTES
"Assumed pt care at 0700. Received report from Ye MARTINEZ. A&O x4. Pt reports 7/10 lower back pain, declines intervention at this time, states \"It was worse and has started to get better so I'm going to let it get better on it's own.\" C/o nausea, medicated per MAR.    Updated on POC, communication board updated. NPO for bronchoscopy today. Bed locked and in lowest position. Call light and belongings within reach. Non-skid socks in place. Needs met, will continue to monitor.  "

## 2021-05-13 NOTE — CONSULTS
DATE OF SERVICE:  05/12/2021     PULMONARY MEDICINE INPATIENT CONSULTATION     PRIMARY CARE PHYSICIAN:  Ella Matthew MD     CODE STATUS:  Full code.     HOSPITAL PHYSICIAN:  Eric Pierre MD.     CHIEF COMPLAINT:  Shortness of breath.     HISTORY OF PRESENTING ILLNESS:  This is a 25-year-old female who presented on   05/12/2021 with several days history of worsening shortness of breath.  She   has a past medical history significant for lupus and is known to have lupus   nephritis.  She is an end-stage renal disease dialysis dependent patient.  She   was recently referred by Dr. Wong of Rheumatology for progressive dyspnea   and patient has been on oxygen.  She was referred for atypical infections or   PCP pneumonia infection and she was seen by Pulmonary here at [facility].  She had a CT scan done in 04/2021 that showed lower lobe   predominant ground glass opacities with patchy areas bilaterally.  The patient   is known to have chronic anemia and she is transfusion dependent.  There were   concerns of possible lupus pneumonitis as well at the same time.     On this admission, the patient has been complaining of worsening shortness of   breath.  She says that she undergoes regular hemodialysis and she underwent   her dialysis session on Monday.  She was scheduled for dialysis today.  In the   ED, she was undergoing hemodialysis today.  Despite having regular   hemodialysis, she continued to feel worsened shortness of breath and this   morning she was feeling increasingly short of breath as well.  She also has a   nonproductive cough as well at the same time.  She denies any fevers, but has   been feeling significantly tired and lethargic over the last few weeks.  She   is on her baseline 2 liters of oxygen, but recently increased her oxygen to   about 4 liters of oxygen and this morning she had to increase it up to 8   liters of oxygen to keep her oxygen saturations above normal.      REVIEW OF SYSTEMS:  CONSTITUTIONAL:  Negative for chills, fevers, but positive for malaise and   fatigue and feeling tired and lethargic.  HEAD AND NECK:  Negative for any congestion.  RESPIRATORY:  Positive for cough and shortness of breath.  No phlegm   production.  CARDIOVASCULAR:  Negative for any chest pain, palpitations or leg swelling.  GASTROINTESTINAL:  Negative for nausea, vomiting, diarrhea or abdominal pain.  GENITOURINARY:  Negative for any symptoms.  MUSCULOSKELETAL:  Negative for falls and myalgias.  NEUROLOGIC:  Negative for dizziness, loss of consciousness, weakness, etc.,   which is focal.  The patient is complaining of generalized weakness.  PSYCHIATRIC:  Negative for any depression or suicidal ideas.  All other   systems reviewed and are negative.     PAST MEDICAL HISTORY:  1.  Anemia.  2.  AV fistula.  3.  End-stage renal disease, on hemodialysis.  4.  Lupus.  5.  Lupus nephritis.  6.  Recurrent admissions with significant pulmonary edema.     PAST SURGICAL HISTORY:  1.  Cholecystectomy.  2.  AV fistula creation.  3.  Endoscopy.     FAMILY HISTORY:  Positive for diabetes in paternal grandmother.     SOCIAL HISTORY:  The patient is a lifelong nonsmoker.  She has never used   smokeless tobacco.  She does not drink alcohol or use any illicit drugs.     CURRENT MEDICATIONS:  1.  Mycophenolate.  2.  Prednisone.  3.  Hydroxychloroquine.  4.  Amlodipine.  5.  Carvedilol.  6.  Clonidine.  7.  Losartan.     PHYSICAL EXAMINATION:  CONSTITUTIONAL:  Patient is alert and oriented.  She is tachycardic and not in   any significant respiratory distress.  She is currently on 7 liters and   saturating 98%.  The patient appears ill appearing.  HEAD AND NECK:  Normocephalic, atraumatic.  EYES:  Extraocular movements intact.  NECK:  No tracheal deviation.  CARDIOVASCULAR:  Normal rhythm, tachycardic.  No murmurs, rubs or gallops.  PULMONARY:  The patient has bilateral fine crackles bibasilar.  No wheezing.  CHEST  WALL:  No significant abnormality.  MUSCULOSKELETAL:  Overall, normal range of movement.  NEUROLOGIC:  No obvious focal neurological deficits.     DATA REVIEW:  LABORATORY RESULTS:  The patient has WBC count of 5.2, hemoglobin is 9.5 which   is more or less of baseline after transfusion, She has platelets 129,000. Her   chem panel showed a sodium of 129, potassium of 5.6, BUN 43 and creatinine   5.47.  ProBNP 75,000.     DIAGNOSTIC DATA:  The patient had a chest x-ray done on admission that   actually showed worsening bilateral pulmonary parenchymal opacities, more   prominent on the right side compared to the left.  She has bilateral pleural   effusions.     ASSESSMENT:  1.  Acute-on-chronic hypoxic respiratory failure.  2.  Systemic lupus erythematosus.  3.  Lupus nephritis.  4.  End-stage renal disease, hemodialysis dependent patient.  5.  Heart failure with reduced ejection fraction.  6.  Pulmonary hypertension, likely group 2.     PLAN:  I am concerned about this lady.  I understand that she has heart   failure with reduced ejection fraction, end-stage renal disease, raised   proBNP, but despite all that she is currently immunosuppressed with three   medications, she is on prednisone, mycophenolate and hydroxychloroquine.  I am   just worried if her progressive hypoxic respiratory failure, shortness of   breath is a sign of atypical infections including most importantly PCP   pneumonia, especially as I do not see PCP prophylaxis as an outpatient.  I am   inclined to admit this patient to the hospital and then perform a bronchoscopy   tomorrow.  I have given the patient verbal explanation of the procedure.  I   have advised her to keep herself n.p.o. after midnight tonight.  The patient   is agreeable to get admitted and have the bronchoscopy tomorrow.  I have   discussed risks and benefits that she has complete capacity to make medical   decisions.     While we are working her up for atypical infections and/or  PCP, I agree with   managing her renal failure with regular hemodialysis and consideration for   management for her heart failure by cardiology as an outpatient.  Her chest   x-ray findings are certainly a little bit more than what I would have expected   for just volume overload at this point in time.     I have discussed this case with the hospitalist and nursing staff in the ED   and the patient and they are all in agreement.        ______________________________  MD FABIAN Kim/MITCHELL/DOMINIQUE    DD:  05/12/2021 15:14  DT:  05/12/2021 16:08    Job#:  [id number]

## 2021-05-13 NOTE — DISCHARGE SUMMARY
"Discharge Summary    CHIEF COMPLAINT ON ADMISSION  Chief Complaint   Patient presents with   • Shortness of Breath       Reason for Admission  Shortness of Breath; Chest Pain      Admission Date  5/12/2021    CODE STATUS  Full Code    HPI & HOSPITAL COURSE  Per notes, \"25 y.o. female who presented 5/12/2021 with shortness of breath.  Patient is an end-stage renal dialysis patient with hemodialysis Monday, Wednesday and Friday.  She states she had a regular hemodialysis on Monday.  She was next scheduled early this morning however whenever she woke up she began having shortness of breath as well as a nonproductive cough.  Her normal dialysis is 6 this morning, she arrived approximately 50 minutes prior.  She is normally supposed to be on 4 L of oxygen, she increased it to 8.  I did discuss the case including labs and imaging with the ER physician.\"  Patient was admitted with acute on chronic respiratory failure.  She was evaluated by nephrology and received hemodialysis per dialysis schedule on 5/12.  Additionally she was evaluated by pulmonology, and there is some concern for underlying infection.  She underwent bronchoscopy on 5/13/2021.  She will follow-up with pulmonology in the outpatient setting, appointment scheduled.  Additionally pulmonology recommended she be started on PCP prophylaxis with dapsone.  She will also need to follow-up with cardiology, nephrology and hematology.  I did discuss all studies, labs and procedures with patient in detail.    Therefore, she is discharged in good and stable condition to home with close outpatient follow-up.    The patient recovered much more quickly than anticipated on admission.    Discharge Date  5/13/2021    FOLLOW UP ITEMS POST DISCHARGE  Follow-up with nephrologist  Follow-up with pulmonologist  Follow-up with hematologist  Follow-up with cardiologist    DISCHARGE DIAGNOSES  Active Problems:    ESRD (end stage renal disease) on dialysis (HCC) POA: Yes    HTN " "(hypertension) POA: Yes      Overview: \"Controlled with medication\"    Lupus (HCC) POA: Yes    Hyponatremia POA: Yes    Hyperkalemia POA: Yes    Anemia due to chronic kidney disease POA: Yes  Resolved Problems:    Acute on chronic respiratory failure with hypoxia (HCC) POA: Yes      FOLLOW UP  Future Appointments   Date Time Provider Department Center   5/15/2021  2:30 PM [facility] [address]   5/19/2021  2:10 PM Anne Lombardi M.D. [facility] None   5/27/2021  8:00 AM [facility] QUICK INJECT [facility] [address]   5/29/2021  8:30 AM [facility] [address]   6/10/2021  8:30 AM INF[facility]ION QUICK INJECT ON[facility] [address]   6/12/2021 10:00 AM [facility] [address]   6/24/2021  9:00 AM INF[facility]ION QUICK INJECT ON[facility] [address]   6/26/2021  8:30 AM [facility] [address]     No follow-up provider specified.    MEDICATIONS ON DISCHARGE     Medication List      ASK your doctor about these medications      Instructions   amLODIPine 10 MG Tabs  Commonly known as: NORVASC   Take 1 tablet by mouth every evening.  Dose: 10 mg     carvedilol 25 MG Tabs  Commonly known as: COREG   Take 1 tablet by mouth 2 times a day with meals.  Dose: 25 mg     cloNIDine 0.1 MG/24HR Ptwk patch  Commonly known as: CATAPRES   Place 1 Patch on the skin every 7 days. On Wed  Dose: 1 Patch     hydroxychloroquine 200 MG Tabs  Commonly known as: Plaquenil   Take 200 mg by mouth every evening.  Dose: 200 mg     losartan 25 MG Tabs  Commonly known as: COZAAR   Take 1 tablet by mouth every day.  Dose: 25 mg     mycophenolate 180 MG EC tablet  Commonly known as: Myfortic   Take 1 Tab by mouth every evening.  Dose: 180 mg     ondansetron 4 MG Tbdp  Commonly known as: ZOFRAN ODT   Take 1 tablet by mouth every four hours as needed for Nausea (give PO if no IV route available).  Dose: 4 mg     predniSONE 5 MG Tabs  Commonly known as: DELTASONE   Take 5 mg by mouth every evening.  Dose: 5 mg     sucralfate 1 GM/10ML Susp  Commonly " known as: CARAFATE   Doctor's comments: Can give tablets at same dosage if pt unable to afford liquid preparation, quantity sufficient for one month  Take 10 mL by mouth 4 Times a Day,Before Meals and at Bedtime.  Dose: 1 g            Allergies  Allergies   Allergen Reactions   • Cephalexin Rash     .   • Clindamycin Rash     .   • Methylprednisolone Unspecified     Anxious   • Metoprolol Rash     .   • Norco [Hydrocodone-Acetaminophen] Nausea       DIET  Orders Placed This Encounter   Procedures   • Diet NPO     Standing Status:   Standing     Number of Occurrences:   8     Order Specific Question:   Restrict to:     Answer:   Strict [1]       ACTIVITY  As tolerated.  Weight bearing as tolerated    CONSULTATIONS  Pulmonology  Nephrology    PROCEDURES  Bronchoscopy    LABORATORY  Lab Results   Component Value Date    SODIUM 132 (L) 05/13/2021    POTASSIUM 5.0 05/13/2021    CHLORIDE 92 (L) 05/13/2021    CO2 31 05/13/2021    GLUCOSE 94 05/13/2021    BUN 22 05/13/2021    CREATININE 3.53 (H) 05/13/2021        Lab Results   Component Value Date    WBC 3.3 (L) 05/13/2021    HEMOGLOBIN 9.0 (L) 05/13/2021    HEMATOCRIT 30.6 (L) 05/13/2021    PLATELETCT 104 (L) 05/13/2021        Total time of the discharge process exceeds 40 minutes.

## 2021-05-13 NOTE — OR NURSING
1430 REPORT RECEIVED FROM Em RN TO ASSUME CARE OF THIS PT. PT REPORTS LOWER BACK PAIN 8/10. REPOSITION PT.     1448 PT REPORTS CHEST PAIN 8/10. NOTIFIED DR. BELTRAN. DR. BELTRAN NOT CONCERNED.     1519 VSS.     1531 VSS. PT MEETS CRITERIA FOR TRANSFER TO ROOM.

## 2021-05-13 NOTE — ANESTHESIA PREPROCEDURE EVALUATION
Relevant Problems   PULMONARY   (positive) Shortness of breath      CARDIAC   (positive) Arteriovenous fistula, acquired (HCC)   (positive) HTN (hypertension)   (positive) Hemorrhagic gastritis with hematemesis    (positive) Renovascular hypertension      GI   (positive) Gastroesophageal reflux disease without esophagitis         (positive) ESRD (end stage renal disease) on dialysis (HCC)       Physical Exam    Airway   Mallampati: II  TM distance: >3 FB  Neck ROM: full       Cardiovascular - normal exam  Rhythm: regular  Rate: normal     Dental - normal exam           Pulmonary   Breath sounds clear to auscultation     Abdominal - normal exam     Neurological - normal exam                 Anesthesia Plan    ASA 3   ASA physical status 3 criteria: ESRD undergoing regularly scheduled dialysis    Plan - general       Airway plan will be ETT        Plan Factors:   Patient was not previously instructed to abstain from smoking on day of procedure.  Patient did not smoke on day of procedure.      Induction: intravenous      Pertinent diagnostic labs and testing reviewed    Informed Consent:    Anesthetic plan and risks discussed with patient.    Use of blood products discussed with: patient whom.

## 2021-05-13 NOTE — ANESTHESIA POSTPROCEDURE EVALUATION
Patient: Lily Nicole    Procedure Summary     Date: 05/13/21 Room / Location:  PROCEDURE ROOM / SURGERY [facility]    Anesthesia Start: 1351 Anesthesia Stop: 1427    Procedure: BRONCHOSCOPY (Bilateral Chest) Diagnosis: (Pulmonary edema )    Surgeons: William Spangler M.D. Responsible Provider: Sidney Herring M.D.    Anesthesia Type: general ASA Status: 3          Final Anesthesia Type: general  Last vitals  BP   Blood Pressure: 134/88    Temp   36.9 °C (98.5 °F)    Pulse   82   Resp   17    SpO2   100 %      Anesthesia Post Evaluation    Patient location during evaluation: PACU  Patient participation: complete - patient participated  Level of consciousness: awake  Pain score: 2    Airway patency: patent  Anesthetic complications: no  Cardiovascular status: adequate  Respiratory status: acceptable  Hydration status: acceptable    PONV: none          No complications documented.     Nurse Pain Score: 8 (NPRS)

## 2021-05-13 NOTE — ANESTHESIA TIME REPORT
Anesthesia Start and Stop Event Times     Date Time Event    5/13/2021 1351 Anesthesia Start     1427 Anesthesia Stop        Responsible Staff  05/13/21    Name Role Begin End    Sidney Herring M.D. Anesth 1351 1427        Preop Diagnosis (Free Text):  Pre-op Diagnosis     Pulmonary edema         Preop Diagnosis (Codes):    Post op Diagnosis  Pulmonary edema      Premium Reason  Non-Premium    Comments:

## 2021-05-13 NOTE — OR NURSING
1217:  Patient allergies and NPO status verified, home medication reconciliation completed and belongings secured. Patient verbalizes understanding of pain scale, expected course of stay and plan of care. Surgical site verified with patient. IV access established. Sequentials placed on legs. Triple aim completed by CNA.

## 2021-05-13 NOTE — CARE PLAN
Problem: Nutritional:  Goal: Achieve adequate nutritional intake  Description: Initiate diet and advance beyond clear liquid diet when medically feasible. Patient will consume >50% of meals  Outcome: Not Met    See Dietetic Intern note

## 2021-05-13 NOTE — OR NURSING
1426: Received pt from Endo. Pt woke up as soon as pt arrived. OPA removed. Respirations spontaneous and unlabored. Lung sounds clear on auscultation. Siderails up and bed locked  for safety.  [id number]. Discharge from PACU in stable condition. O2 at  2lpm via nc.  Transport viagurney with more than half full oxygen tank. Side rails up.  Pain controlled and tolerable. Denies nausea. SEPULVEDA. Lung sounds clear on auscultation.

## 2021-05-13 NOTE — PROGRESS NOTES
Telemetry Shift Summary    Rhythm SR  HR Range 98  Ectopy none  Measurements 0.18/0.06/0.32        Normal Values  Rhythm SR  HR Range    Measurements 0.12-0.20 / 0.06-0.10  / 0.30-0.52

## 2021-05-13 NOTE — DISCHARGE INSTRUCTIONS
Discharge Instructions    Discharged to home by car with relative. Discharged via wheelchair, hospital escort: Yes.  Special equipment needed: Not Applicable    Be sure to schedule a follow-up appointment with your primary care doctor or any specialists as instructed.     Discharge Plan:   Diet Plan: Discussed  Activity Level: Discussed  Confirmed Follow up Appointment: Appointment Scheduled  Confirmed Symptoms Management: Discussed  Medication Reconciliation Updated: Yes    I understand that a diet low in cholesterol, fat, and sodium is recommended for good health. Unless I have been given specific instructions below for another diet, I accept this instruction as my diet prescription.   Other diet: As tolerated    Special Instructions: None    · Is patient discharged on Warfarin / Coumadin?   No     Depression / Suicide Risk    As you are discharged from this [facility] facility, it is important to learn how to keep safe from harming yourself.    Recognize the warning signs:  · Abrupt changes in personality, positive or negative- including increase in energy   · Giving away possessions  · Change in eating patterns- significant weight changes-  positive or negative  · Change in sleeping patterns- unable to sleep or sleeping all the time   · Unwillingness or inability to communicate  · Depression  · Unusual sadness, discouragement and loneliness  · Talk of wanting to die  · Neglect of personal appearance   · Rebelliousness- reckless behavior  · Withdrawal from people/activities they love  · Confusion- inability to concentrate     If you or a loved one observes any of these behaviors or has concerns about self-harm, here's what you can do:  · Talk about it- your feelings and reasons for harming yourself  · Remove any means that you might use to hurt yourself (examples: pills, rope, extension cords, firearm)  · Get professional help from the community (Mental Health, Substance Abuse, psychological counseling)  · Do  not be alone:Call your Safe Contact- someone whom you trust who will be there for you.  · Call your local CRISIS HOTLINE 235-6418 or 244-953-9459  · Call your local Children's Mobile Crisis Response Team [place] (423) 095-2184 or www.[employer]  · Call the toll free National Suicide Prevention Hotlines   · National Suicide Prevention Lifeline 808-634-FFCL (3794)  · National [employer] Line Network 800-SUICIDE (394-8279)

## 2021-05-13 NOTE — CARE PLAN
Problem: Knowledge Deficit - Standard  Goal: Patient and family/care givers will demonstrate understanding of plan of care, disease process/condition, diagnostic tests and medications  Outcome: Progressing     Problem: Pain - Standard  Goal: Alleviation of pain or a reduction in pain to the patient’s comfort goal  Outcome: Progressing     Problem: Respiratory  Goal: Patient will achieve/maintain optimum respiratory ventilation and gas exchange  Outcome: Progressing     The patient is Stable - Low risk of patient condition declining or worsening    Shift Goals  Clinical Goals: Safety, decrease SOB  Patient Goals: Be back to baseline 4L o2, discharge

## 2021-05-14 LAB
PATHOLOGY CONSULT NOTE: NORMAL
RHODAMINE-AURAMINE STN SPEC: NORMAL
SIGNIFICANT IND 70042: NORMAL
SITE SITE: NORMAL
SOURCE SOURCE: NORMAL

## 2021-05-15 ENCOUNTER — APPOINTMENT (OUTPATIENT)
Dept: ONCOLOGY | Facility: MEDICAL CENTER | Age: 26
End: 2021-05-15
Attending: INTERNAL MEDICINE
Payer: COMMERCIAL

## 2021-05-15 LAB
BACTERIA SPEC RESP CULT: NORMAL
GRAM STN SPEC: NORMAL
SIGNIFICANT IND 70042: NORMAL
SITE SITE: NORMAL
SOURCE SOURCE: NORMAL

## 2021-05-19 ENCOUNTER — APPOINTMENT (OUTPATIENT)
Dept: RADIOLOGY | Facility: MEDICAL CENTER | Age: 26
DRG: 189 | End: 2021-05-19
Attending: EMERGENCY MEDICINE
Payer: COMMERCIAL

## 2021-05-19 ENCOUNTER — HOSPITAL ENCOUNTER (INPATIENT)
Facility: MEDICAL CENTER | Age: 26
LOS: 1 days | DRG: 189 | End: 2021-05-20
Attending: EMERGENCY MEDICINE | Admitting: HOSPITALIST
Payer: COMMERCIAL

## 2021-05-19 DIAGNOSIS — K92.2 GASTROINTESTINAL HEMORRHAGE, UNSPECIFIED GASTROINTESTINAL HEMORRHAGE TYPE: ICD-10-CM

## 2021-05-19 DIAGNOSIS — R09.02 HYPOXIA: ICD-10-CM

## 2021-05-19 DIAGNOSIS — R06.00 DYSPNEA, UNSPECIFIED TYPE: ICD-10-CM

## 2021-05-19 PROBLEM — J96.20 ACUTE ON CHRONIC RESPIRATORY FAILURE (HCC): Status: ACTIVE | Noted: 2021-03-19

## 2021-05-19 LAB
ABO GROUP BLD: NORMAL
ANION GAP SERPL CALC-SCNC: 12 MMOL/L (ref 7–16)
ANISOCYTOSIS BLD QL SMEAR: ABNORMAL
BASOPHILS # BLD AUTO: 0.6 % (ref 0–1.8)
BASOPHILS # BLD: 0.04 K/UL (ref 0–0.12)
BLD GP AB SCN SERPL QL: NORMAL
BUN SERPL-MCNC: 40 MG/DL (ref 8–22)
CALCIUM SERPL-MCNC: 9.1 MG/DL (ref 8.4–10.2)
CHLORIDE SERPL-SCNC: 89 MMOL/L (ref 96–112)
CO2 SERPL-SCNC: 30 MMOL/L (ref 20–33)
COMMENT 1642: NORMAL
CREAT SERPL-MCNC: 5.22 MG/DL (ref 0.5–1.4)
EKG IMPRESSION: NORMAL
EOSINOPHIL # BLD AUTO: 0.04 K/UL (ref 0–0.51)
EOSINOPHIL NFR BLD: 0.6 % (ref 0–6.9)
ERYTHROCYTE [DISTWIDTH] IN BLOOD BY AUTOMATED COUNT: 69.4 FL (ref 35.9–50)
FLUAV RNA SPEC QL NAA+PROBE: NEGATIVE
FLUBV RNA SPEC QL NAA+PROBE: NEGATIVE
GLUCOSE SERPL-MCNC: 102 MG/DL (ref 65–99)
HAV IGM SERPL QL IA: NORMAL
HBV CORE IGM SER QL: NORMAL
HBV SURFACE AB SERPL IA-ACNC: 1345 MIU/ML (ref 0–10)
HBV SURFACE AG SER QL: NORMAL
HCG SERPL QL: NEGATIVE
HCT VFR BLD AUTO: 28.5 % (ref 37–47)
HCT VFR BLD AUTO: 34.1 % (ref 37–47)
HCT VFR BLD AUTO: 35.1 % (ref 37–47)
HCV AB SER QL: NORMAL
HGB BLD-MCNC: 10.1 G/DL (ref 12–16)
HGB BLD-MCNC: 10.2 G/DL (ref 12–16)
HGB BLD-MCNC: 8.3 G/DL (ref 12–16)
HYPOCHROMIA BLD QL SMEAR: ABNORMAL
IMM GRANULOCYTES # BLD AUTO: 0.03 K/UL (ref 0–0.11)
IMM GRANULOCYTES NFR BLD AUTO: 0.5 % (ref 0–0.9)
LACTATE BLD-SCNC: 2.1 MMOL/L (ref 0.5–2)
LG PLATELETS BLD QL SMEAR: NORMAL
LYMPHOCYTES # BLD AUTO: 0.32 K/UL (ref 1–4.8)
LYMPHOCYTES NFR BLD: 5 % (ref 22–41)
MACROCYTES BLD QL SMEAR: ABNORMAL
MCH RBC QN AUTO: 28.7 PG (ref 27–33)
MCHC RBC AUTO-ENTMCNC: 29.6 G/DL (ref 33.6–35)
MCV RBC AUTO: 96.9 FL (ref 81.4–97.8)
MICROCYTES BLD QL SMEAR: ABNORMAL
MONOCYTES # BLD AUTO: 0.32 K/UL (ref 0–0.85)
MONOCYTES NFR BLD AUTO: 5 % (ref 0–13.4)
NEUTROPHILS # BLD AUTO: 5.71 K/UL (ref 2–7.15)
NEUTROPHILS NFR BLD: 88.3 % (ref 44–72)
NRBC # BLD AUTO: 0 K/UL
NRBC BLD-RTO: 0 /100 WBC
OVALOCYTES BLD QL SMEAR: NORMAL
PLATELET # BLD AUTO: 141 K/UL (ref 164–446)
PLATELET BLD QL SMEAR: NORMAL
PMV BLD AUTO: 10.2 FL (ref 9–12.9)
POIKILOCYTOSIS BLD QL SMEAR: NORMAL
POLYCHROMASIA BLD QL SMEAR: NORMAL
POTASSIUM SERPL-SCNC: 5.3 MMOL/L (ref 3.6–5.5)
PROCALCITONIN SERPL-MCNC: 1 NG/ML
RBC # BLD AUTO: 3.52 M/UL (ref 4.2–5.4)
RBC BLD AUTO: PRESENT
RH BLD: NORMAL
RSV RNA SPEC QL NAA+PROBE: NEGATIVE
SARS-COV-2 RNA RESP QL NAA+PROBE: NOTDETECTED
SODIUM SERPL-SCNC: 131 MMOL/L (ref 135–145)
SPECIMEN SOURCE: NORMAL
TROPONIN T SERPL-MCNC: 562 NG/L (ref 6–19)
TROPONIN T SERPL-MCNC: 595 NG/L (ref 6–19)
WBC # BLD AUTO: 6.5 K/UL (ref 4.8–10.8)

## 2021-05-19 PROCEDURE — 84703 CHORIONIC GONADOTROPIN ASSAY: CPT

## 2021-05-19 PROCEDURE — 94640 AIRWAY INHALATION TREATMENT: CPT

## 2021-05-19 PROCEDURE — A9270 NON-COVERED ITEM OR SERVICE: HCPCS | Performed by: INTERNAL MEDICINE

## 2021-05-19 PROCEDURE — 99255 IP/OBS CONSLTJ NEW/EST HI 80: CPT | Performed by: INTERNAL MEDICINE

## 2021-05-19 PROCEDURE — 80074 ACUTE HEPATITIS PANEL: CPT

## 2021-05-19 PROCEDURE — 700102 HCHG RX REV CODE 250 W/ 637 OVERRIDE(OP): Performed by: HOSPITALIST

## 2021-05-19 PROCEDURE — C9803 HOPD COVID-19 SPEC COLLECT: HCPCS | Performed by: EMERGENCY MEDICINE

## 2021-05-19 PROCEDURE — 700111 HCHG RX REV CODE 636 W/ 250 OVERRIDE (IP)

## 2021-05-19 PROCEDURE — 93005 ELECTROCARDIOGRAM TRACING: CPT | Performed by: EMERGENCY MEDICINE

## 2021-05-19 PROCEDURE — A9270 NON-COVERED ITEM OR SERVICE: HCPCS | Performed by: HOSPITALIST

## 2021-05-19 PROCEDURE — 700111 HCHG RX REV CODE 636 W/ 250 OVERRIDE (IP): Performed by: INTERNAL MEDICINE

## 2021-05-19 PROCEDURE — 71045 X-RAY EXAM CHEST 1 VIEW: CPT

## 2021-05-19 PROCEDURE — 96375 TX/PRO/DX INJ NEW DRUG ADDON: CPT

## 2021-05-19 PROCEDURE — 86706 HEP B SURFACE ANTIBODY: CPT

## 2021-05-19 PROCEDURE — 99291 CRITICAL CARE FIRST HOUR: CPT

## 2021-05-19 PROCEDURE — 87641 MR-STAPH DNA AMP PROBE: CPT

## 2021-05-19 PROCEDURE — 0241U HCHG SARS-COV-2 COVID-19 NFCT DS RESP RNA 4 TRGT MIC: CPT

## 2021-05-19 PROCEDURE — 86900 BLOOD TYPING SEROLOGIC ABO: CPT

## 2021-05-19 PROCEDURE — 700105 HCHG RX REV CODE 258: Performed by: INTERNAL MEDICINE

## 2021-05-19 PROCEDURE — C9113 INJ PANTOPRAZOLE SODIUM, VIA: HCPCS

## 2021-05-19 PROCEDURE — 770020 HCHG ROOM/CARE - TELE (206)

## 2021-05-19 PROCEDURE — 87040 BLOOD CULTURE FOR BACTERIA: CPT | Mod: 91

## 2021-05-19 PROCEDURE — C9113 INJ PANTOPRAZOLE SODIUM, VIA: HCPCS | Performed by: INTERNAL MEDICINE

## 2021-05-19 PROCEDURE — 85025 COMPLETE CBC W/AUTO DIFF WBC: CPT

## 2021-05-19 PROCEDURE — 90935 HEMODIALYSIS ONE EVALUATION: CPT

## 2021-05-19 PROCEDURE — 700111 HCHG RX REV CODE 636 W/ 250 OVERRIDE (IP): Performed by: EMERGENCY MEDICINE

## 2021-05-19 PROCEDURE — 80048 BASIC METABOLIC PNL TOTAL CA: CPT

## 2021-05-19 PROCEDURE — 86850 RBC ANTIBODY SCREEN: CPT

## 2021-05-19 PROCEDURE — 94760 N-INVAS EAR/PLS OXIMETRY 1: CPT

## 2021-05-19 PROCEDURE — 83605 ASSAY OF LACTIC ACID: CPT

## 2021-05-19 PROCEDURE — 700102 HCHG RX REV CODE 250 W/ 637 OVERRIDE(OP): Performed by: INTERNAL MEDICINE

## 2021-05-19 PROCEDURE — 85014 HEMATOCRIT: CPT

## 2021-05-19 PROCEDURE — 99254 IP/OBS CNSLTJ NEW/EST MOD 60: CPT | Performed by: INTERNAL MEDICINE

## 2021-05-19 PROCEDURE — 86901 BLOOD TYPING SEROLOGIC RH(D): CPT

## 2021-05-19 PROCEDURE — 96374 THER/PROPH/DIAG INJ IV PUSH: CPT

## 2021-05-19 PROCEDURE — 85018 HEMOGLOBIN: CPT

## 2021-05-19 PROCEDURE — 5A1D70Z PERFORMANCE OF URINARY FILTRATION, INTERMITTENT, LESS THAN 6 HOURS PER DAY: ICD-10-PCS | Performed by: INTERNAL MEDICINE

## 2021-05-19 PROCEDURE — 84484 ASSAY OF TROPONIN QUANT: CPT

## 2021-05-19 PROCEDURE — 99291 CRITICAL CARE FIRST HOUR: CPT | Performed by: HOSPITALIST

## 2021-05-19 PROCEDURE — 84145 PROCALCITONIN (PCT): CPT

## 2021-05-19 PROCEDURE — 700111 HCHG RX REV CODE 636 W/ 250 OVERRIDE (IP): Performed by: HOSPITALIST

## 2021-05-19 RX ORDER — HYDROMORPHONE HYDROCHLORIDE 1 MG/ML
0.2 INJECTION, SOLUTION INTRAMUSCULAR; INTRAVENOUS; SUBCUTANEOUS
Status: DISCONTINUED | OUTPATIENT
Start: 2021-05-19 | End: 2021-05-19

## 2021-05-19 RX ORDER — AMOXICILLIN 250 MG
2 CAPSULE ORAL 2 TIMES DAILY
Status: DISCONTINUED | OUTPATIENT
Start: 2021-05-19 | End: 2021-05-20 | Stop reason: HOSPADM

## 2021-05-19 RX ORDER — SODIUM CHLORIDE 9 MG/ML
INJECTION, SOLUTION INTRAVENOUS
Status: DISPENSED
Start: 2021-05-19 | End: 2021-05-19

## 2021-05-19 RX ORDER — HYDROXYCHLOROQUINE SULFATE 200 MG/1
200 TABLET, FILM COATED ORAL EVERY EVENING
Status: DISCONTINUED | OUTPATIENT
Start: 2021-05-19 | End: 2021-05-20 | Stop reason: HOSPADM

## 2021-05-19 RX ORDER — LORAZEPAM 2 MG/ML
1 INJECTION INTRAMUSCULAR ONCE
Status: COMPLETED | OUTPATIENT
Start: 2021-05-19 | End: 2021-05-19

## 2021-05-19 RX ORDER — CARVEDILOL 25 MG/1
25 TABLET ORAL 2 TIMES DAILY WITH MEALS
Status: DISCONTINUED | OUTPATIENT
Start: 2021-05-19 | End: 2021-05-20 | Stop reason: HOSPADM

## 2021-05-19 RX ORDER — MORPHINE SULFATE 4 MG/ML
4 INJECTION, SOLUTION INTRAMUSCULAR; INTRAVENOUS ONCE
Status: COMPLETED | OUTPATIENT
Start: 2021-05-19 | End: 2021-05-19

## 2021-05-19 RX ORDER — LOSARTAN POTASSIUM 25 MG/1
25 TABLET ORAL EVERY EVENING
Status: DISCONTINUED | OUTPATIENT
Start: 2021-05-19 | End: 2021-05-20 | Stop reason: HOSPADM

## 2021-05-19 RX ORDER — PREDNISONE 5 MG/1
5 TABLET ORAL DAILY
COMMUNITY

## 2021-05-19 RX ORDER — PROCHLORPERAZINE EDISYLATE 5 MG/ML
5-10 INJECTION INTRAMUSCULAR; INTRAVENOUS EVERY 4 HOURS PRN
Status: DISCONTINUED | OUTPATIENT
Start: 2021-05-19 | End: 2021-05-20 | Stop reason: HOSPADM

## 2021-05-19 RX ORDER — BUDESONIDE AND FORMOTEROL FUMARATE DIHYDRATE 160; 4.5 UG/1; UG/1
2 AEROSOL RESPIRATORY (INHALATION)
Status: DISCONTINUED | OUTPATIENT
Start: 2021-05-19 | End: 2021-05-20 | Stop reason: HOSPADM

## 2021-05-19 RX ORDER — ONDANSETRON 4 MG/1
4 TABLET, ORALLY DISINTEGRATING ORAL EVERY 4 HOURS PRN
Status: DISCONTINUED | OUTPATIENT
Start: 2021-05-19 | End: 2021-05-20 | Stop reason: HOSPADM

## 2021-05-19 RX ORDER — PANTOPRAZOLE SODIUM 40 MG/10ML
40 INJECTION, POWDER, LYOPHILIZED, FOR SOLUTION INTRAVENOUS DAILY
Status: DISCONTINUED | OUTPATIENT
Start: 2021-05-19 | End: 2021-05-19

## 2021-05-19 RX ORDER — MYCOPHENOLATE MOFETIL 250 MG/1
250 CAPSULE ORAL EVERY EVENING
Status: DISCONTINUED | OUTPATIENT
Start: 2021-05-19 | End: 2021-05-20 | Stop reason: HOSPADM

## 2021-05-19 RX ORDER — ACETAMINOPHEN 325 MG/1
650 TABLET ORAL EVERY 6 HOURS PRN
Status: DISCONTINUED | OUTPATIENT
Start: 2021-05-19 | End: 2021-05-20 | Stop reason: HOSPADM

## 2021-05-19 RX ORDER — SODIUM CHLORIDE 9 MG/ML
250 INJECTION, SOLUTION INTRAVENOUS
Status: DISCONTINUED | OUTPATIENT
Start: 2021-05-19 | End: 2021-05-20 | Stop reason: HOSPADM

## 2021-05-19 RX ORDER — PREDNISONE 5 MG/1
5 TABLET ORAL EVERY EVENING
Status: DISCONTINUED | OUTPATIENT
Start: 2021-05-19 | End: 2021-05-20 | Stop reason: HOSPADM

## 2021-05-19 RX ORDER — CLONIDINE 0.2 MG/24H
1 PATCH, EXTENDED RELEASE TRANSDERMAL
COMMUNITY
Start: 2021-04-30 | End: 2022-01-01

## 2021-05-19 RX ORDER — PROMETHAZINE HYDROCHLORIDE 25 MG/1
12.5-25 TABLET ORAL EVERY 4 HOURS PRN
Status: DISCONTINUED | OUTPATIENT
Start: 2021-05-19 | End: 2021-05-20 | Stop reason: HOSPADM

## 2021-05-19 RX ORDER — AMLODIPINE BESYLATE 5 MG/1
10 TABLET ORAL EVERY EVENING
Status: DISCONTINUED | OUTPATIENT
Start: 2021-05-19 | End: 2021-05-20 | Stop reason: HOSPADM

## 2021-05-19 RX ORDER — LINEZOLID 600 MG/1
600 TABLET, FILM COATED ORAL EVERY 12 HOURS
Status: DISCONTINUED | OUTPATIENT
Start: 2021-05-19 | End: 2021-05-19

## 2021-05-19 RX ORDER — PANTOPRAZOLE SODIUM 40 MG/10ML
INJECTION, POWDER, LYOPHILIZED, FOR SOLUTION INTRAVENOUS
Status: COMPLETED
Start: 2021-05-19 | End: 2021-05-19

## 2021-05-19 RX ORDER — LORAZEPAM 2 MG/ML
1 INJECTION INTRAMUSCULAR ONCE
Status: COMPLETED | OUTPATIENT
Start: 2021-05-20 | End: 2021-05-19

## 2021-05-19 RX ORDER — FUROSEMIDE 40 MG/1
240 TABLET ORAL
Status: DISCONTINUED | OUTPATIENT
Start: 2021-05-19 | End: 2021-05-20 | Stop reason: HOSPADM

## 2021-05-19 RX ORDER — MYCOPHENOLIC ACID 180 MG/1
180 TABLET, DELAYED RELEASE ORAL EVERY EVENING
Status: DISCONTINUED | OUTPATIENT
Start: 2021-05-19 | End: 2021-05-19

## 2021-05-19 RX ORDER — PROMETHAZINE HYDROCHLORIDE 25 MG/1
12.5-25 SUPPOSITORY RECTAL EVERY 4 HOURS PRN
Status: DISCONTINUED | OUTPATIENT
Start: 2021-05-19 | End: 2021-05-20 | Stop reason: HOSPADM

## 2021-05-19 RX ORDER — PANTOPRAZOLE SODIUM 40 MG/1
40 TABLET, DELAYED RELEASE ORAL 2 TIMES DAILY
COMMUNITY
End: 2021-06-15 | Stop reason: SDUPTHER

## 2021-05-19 RX ORDER — SUCRALFATE ORAL 1 G/10ML
1 SUSPENSION ORAL 2 TIMES DAILY
Status: DISCONTINUED | OUTPATIENT
Start: 2021-05-19 | End: 2021-05-20 | Stop reason: HOSPADM

## 2021-05-19 RX ORDER — HYDROMORPHONE HYDROCHLORIDE 1 MG/ML
0.5 INJECTION, SOLUTION INTRAMUSCULAR; INTRAVENOUS; SUBCUTANEOUS
Status: DISCONTINUED | OUTPATIENT
Start: 2021-05-19 | End: 2021-05-20 | Stop reason: HOSPADM

## 2021-05-19 RX ORDER — PANTOPRAZOLE SODIUM 40 MG/10ML
40 INJECTION, POWDER, LYOPHILIZED, FOR SOLUTION INTRAVENOUS 2 TIMES DAILY
Status: DISCONTINUED | OUTPATIENT
Start: 2021-05-19 | End: 2021-05-20

## 2021-05-19 RX ORDER — HYDROMORPHONE HYDROCHLORIDE 1 MG/ML
0.2 INJECTION, SOLUTION INTRAMUSCULAR; INTRAVENOUS; SUBCUTANEOUS
Status: DISCONTINUED | OUTPATIENT
Start: 2021-05-19 | End: 2021-05-20 | Stop reason: HOSPADM

## 2021-05-19 RX ORDER — ONDANSETRON 2 MG/ML
4 INJECTION INTRAMUSCULAR; INTRAVENOUS ONCE
Status: COMPLETED | OUTPATIENT
Start: 2021-05-19 | End: 2021-05-19

## 2021-05-19 RX ORDER — POLYETHYLENE GLYCOL 3350 17 G/17G
1 POWDER, FOR SOLUTION ORAL
Status: DISCONTINUED | OUTPATIENT
Start: 2021-05-19 | End: 2021-05-20 | Stop reason: HOSPADM

## 2021-05-19 RX ORDER — DIPHENHYDRAMINE HYDROCHLORIDE 50 MG/ML
25 INJECTION INTRAMUSCULAR; INTRAVENOUS ONCE
Status: COMPLETED | OUTPATIENT
Start: 2021-05-19 | End: 2021-05-19

## 2021-05-19 RX ORDER — ONDANSETRON 2 MG/ML
4 INJECTION INTRAMUSCULAR; INTRAVENOUS EVERY 4 HOURS PRN
Status: DISCONTINUED | OUTPATIENT
Start: 2021-05-19 | End: 2021-05-20 | Stop reason: HOSPADM

## 2021-05-19 RX ORDER — BISACODYL 10 MG
10 SUPPOSITORY, RECTAL RECTAL
Status: DISCONTINUED | OUTPATIENT
Start: 2021-05-19 | End: 2021-05-20 | Stop reason: HOSPADM

## 2021-05-19 RX ADMIN — LORAZEPAM 1 MG: 2 INJECTION INTRAMUSCULAR; INTRAVENOUS at 23:40

## 2021-05-19 RX ADMIN — ONDANSETRON 4 MG: 4 TABLET, ORALLY DISINTEGRATING ORAL at 21:16

## 2021-05-19 RX ADMIN — MORPHINE SULFATE 4 MG: 4 INJECTION INTRAVENOUS at 05:17

## 2021-05-19 RX ADMIN — MYCOPHENOLATE MOFETIL 250 MG: 250 CAPSULE ORAL at 18:09

## 2021-05-19 RX ADMIN — HYDROMORPHONE HYDROCHLORIDE 0.2 MG: 1 INJECTION, SOLUTION INTRAMUSCULAR; INTRAVENOUS; SUBCUTANEOUS at 18:11

## 2021-05-19 RX ADMIN — CARVEDILOL 25 MG: 25 TABLET, FILM COATED ORAL at 18:10

## 2021-05-19 RX ADMIN — PREDNISONE 5 MG: 5 TABLET ORAL at 18:09

## 2021-05-19 RX ADMIN — PANTOPRAZOLE SODIUM 40 MG: 40 INJECTION, POWDER, LYOPHILIZED, FOR SOLUTION INTRAVENOUS at 18:11

## 2021-05-19 RX ADMIN — LORAZEPAM 1 MG: 2 INJECTION INTRAMUSCULAR; INTRAVENOUS at 06:12

## 2021-05-19 RX ADMIN — DIPHENHYDRAMINE HYDROCHLORIDE 25 MG: 50 INJECTION INTRAMUSCULAR; INTRAVENOUS at 05:17

## 2021-05-19 RX ADMIN — AMLODIPINE BESYLATE 10 MG: 5 TABLET ORAL at 18:09

## 2021-05-19 RX ADMIN — ONDANSETRON 4 MG: 2 INJECTION INTRAMUSCULAR; INTRAVENOUS at 05:17

## 2021-05-19 RX ADMIN — PANTOPRAZOLE SODIUM 40 MG: 40 INJECTION, POWDER, FOR SOLUTION INTRAVENOUS at 05:19

## 2021-05-19 RX ADMIN — BUDESONIDE AND FORMOTEROL FUMARATE DIHYDRATE 2 PUFF: 160; 4.5 AEROSOL RESPIRATORY (INHALATION) at 14:30

## 2021-05-19 RX ADMIN — ONDANSETRON 4 MG: 2 INJECTION INTRAMUSCULAR; INTRAVENOUS at 11:33

## 2021-05-19 RX ADMIN — LOSARTAN POTASSIUM 25 MG: 25 TABLET, FILM COATED ORAL at 18:09

## 2021-05-19 RX ADMIN — HYDROXYCHLOROQUINE SULFATE 200 MG: 200 TABLET, FILM COATED ORAL at 18:10

## 2021-05-19 RX ADMIN — HYDROMORPHONE HYDROCHLORIDE 0.2 MG: 1 INJECTION, SOLUTION INTRAMUSCULAR; INTRAVENOUS; SUBCUTANEOUS at 13:31

## 2021-05-19 RX ADMIN — ACETAMINOPHEN 650 MG: 325 TABLET ORAL at 23:40

## 2021-05-19 RX ADMIN — FUROSEMIDE 240 MG: 40 TABLET ORAL at 16:24

## 2021-05-19 RX ADMIN — EPOETIN ALFA-EPBX 12000 UNITS: 10000 INJECTION, SOLUTION INTRAVENOUS; SUBCUTANEOUS at 13:41

## 2021-05-19 RX ADMIN — PANTOPRAZOLE SODIUM 40 MG: 40 INJECTION, POWDER, LYOPHILIZED, FOR SOLUTION INTRAVENOUS at 05:18

## 2021-05-19 RX ADMIN — CEFEPIME 1 G: 1 INJECTION, POWDER, FOR SOLUTION INTRAMUSCULAR; INTRAVENOUS at 15:29

## 2021-05-19 RX ADMIN — SENNOSIDES AND DOCUSATE SODIUM 2 TABLET: 8.6; 5 TABLET ORAL at 18:10

## 2021-05-19 RX ADMIN — VANCOMYCIN HYDROCHLORIDE 1500 MG: 500 INJECTION, POWDER, LYOPHILIZED, FOR SOLUTION INTRAVENOUS at 16:17

## 2021-05-19 RX ADMIN — CARVEDILOL 25 MG: 25 TABLET, FILM COATED ORAL at 10:33

## 2021-05-19 ASSESSMENT — ENCOUNTER SYMPTOMS
ABDOMINAL PAIN: 0
INSOMNIA: 0
WEIGHT LOSS: 0
CHILLS: 0
PALPITATIONS: 0
PALPITATIONS: 1
COUGH: 1
COUGH: 0
HEMOPTYSIS: 0
DOUBLE VISION: 0
FEVER: 0
SPUTUM PRODUCTION: 0
MYALGIAS: 0
DIZZINESS: 0
ORTHOPNEA: 1
VOMITING: 1
NECK PAIN: 0
ABDOMINAL PAIN: 1
MYALGIAS: 1
HEADACHES: 0
SORE THROAT: 0
CLAUDICATION: 0
WEAKNESS: 0
BLURRED VISION: 0
DIARRHEA: 0
BACK PAIN: 1
NAUSEA: 1
NECK PAIN: 1
SHORTNESS OF BREATH: 1
PND: 1
BLOOD IN STOOL: 0
DEPRESSION: 0
BRUISES/BLEEDS EASILY: 0
WHEEZING: 1

## 2021-05-19 ASSESSMENT — PAIN DESCRIPTION - PAIN TYPE
TYPE: ACUTE PAIN;CHRONIC PAIN
TYPE: ACUTE PAIN;CHRONIC PAIN
TYPE: CHRONIC PAIN
TYPE: ACUTE PAIN
TYPE: ACUTE PAIN;CHRONIC PAIN
TYPE: ACUTE PAIN

## 2021-05-19 ASSESSMENT — FIBROSIS 4 INDEX
FIB4 SCORE: 0.8
FIB4 SCORE: 0.8

## 2021-05-19 NOTE — FLOWSHEET NOTE
05/19/21 0425   Events/Summary/Plan   Events/Summary/Plan [facility] Started   Vital Signs   Pulse (!) 120   Respiration (!) 24   Pulse Oximetry 99 %   $ Pulse Oximetry (Spot Check) Yes   O2 Alarms Set & Reviewed Yes   Respiratory Assessment   Respiratory Pattern Within Normal Limits   Level of Consciousness Alert   Chest Exam   Work Of Breathing / Effort Tachypnea;Shallow   Breath Sounds   RUL Breath Sounds Diminished   RML Breath Sounds Diminished   RLL Breath Sounds Diminished;Fine Crackles   PALLAVI Breath Sounds Diminished   LLL Breath Sounds Fine Crackles;Diminished   Secretions   Cough Non Productive   How Sputum Obtained Spontaneous   Sputum Amount Unable to Evaluate   Sputum Color Unable to Evaluate   Sputum Consistency Unable to Evaluate   Oxygen   O2 (LPM) 45   FiO2% 100 %   O2 Delivery Device Heated High Flow Nasal Cannula   NICU/PEDS Heated Hi Flow Nasal Cannula    $ Heated Hi Flow Nasal Cannula ([facility]FNC) NICU Yes   O2 Analyzer Calibrated Yes   Analyzed FIO2 (FNC) 100   Flowrate ([facility]) 45   Humidifier Temperature ([facility]) 31

## 2021-05-19 NOTE — H&P
Hospital Medicine History & Physical Note    Date of Service  5/19/2021    Primary Care Physician  Ella Matthew M.D.    Consultants  -Will need non-emergency Nephrology Consult to arrange HD  -Will need to arrange GI Consult: She follows with GI Consultants    Code Status  Full Code    Chief Complaint  Chief Complaint   Patient presents with   • Shortness of Breath       History of Presenting Illness  25 y.o. female, well-known to this service, with history of lupus on immunotherapy, and end-stage renal disease on hemodialysis (M, W, F, Dr. Trent), chronic respiratory failure on 5 L of oxygen at baseline, who unfortunately has been hospitalized 9 times in 2021, mostly with respiratory issues and or issues related to GI bleed requiring multiple endoscopies, return to the emergency department today on 5/19/2021 with reported increase of shortness of breath, O2 demand, going from 5 to 8 L at home and having 3 episodes of bright red and maroon vomiting prior to coming to the ED.  Feeling anxious but denies any pain.    ER COURSE:  -Afebrile, tachycardic and tachypneic with significantly elevated blood pressure 203/135.  -Patient was hypoxic on arrival, currently requiring 45 L of heated high flow to maintain O2 saturations above 90%.    -Initial hemoglobin 10.1.  -Creatinine 5.22 with potassium of 5.3, BUN 40.  -Troponin V 195.  Lactic acid 2.1.  -Viral Covid test negative.  -Chest x-ray showing acute pulmonary edema.    Review of Systems  Review of Systems   Constitutional: Positive for malaise/fatigue. Negative for fever.   HENT: Negative for congestion and sore throat.    Eyes: Negative for blurred vision and double vision.   Respiratory: Positive for shortness of breath. Negative for cough.    Cardiovascular: Negative for chest pain and palpitations.   Gastrointestinal: Positive for nausea and vomiting.   Genitourinary: Negative for dysuria and urgency.   Musculoskeletal: Negative for myalgias and neck pain.    Skin: Negative for itching and rash.   Neurological: Negative for dizziness, weakness and headaches.   Endo/Heme/Allergies: Does not bruise/bleed easily.   Psychiatric/Behavioral: Negative for depression. The patient does not have insomnia.        Past Medical History   has a past medical history of Anemia (01/17/2018), AVF (arteriovenous fistula) ([facility]), Chest pain, Chest tightness, Daytime sleepiness, Dialysis patient ([facility]), Difficulty breathing, ESRD (end stage renal disease) on dialysis ([facility]) (01/17/2018), Gasping for breath, Heart burn, Hypertension (01/17/2018), Indigestion, Lupus ([facility]), Migraines (01/17/2018), Painful breathing, Palpitations, Seizure ([facility]) (2013), Shortness of breath, Swelling of lower extremity, and Wheezing.    Surgical History   has a past surgical history that includes ronak by laparoscopy (4/5/2010); av fistula creation (Right); angioplasty (01/17/2018); other; other; gastroscopy-endo (12/9/2019); gastroscopy (N/A, 5/30/2020); gastroscopy-endo (9/18/2020); pr upper gi endoscopy,ctrl bleed (11/12/2020); pr upper gi endoscopy,biopsy (11/12/2020); gastroscopy-endo (11/12/2020); pr colonoscopy,diagnostic (1/8/2021); pr upper gi endoscopy,diagnosis (1/8/2021); pr upper gi endoscopy,ctrl bleed (1/8/2021); pr upper gi endoscopy,diagnosis (3/5/2021); pr upper gi endoscopy,diagnosis (N/A, 3/19/2021); pr upper gi endoscopy,ctrl bleed (3/19/2021); and pr bronchoscopy,diagnostic (Bilateral, 5/13/2021).     Family History  family history includes Diabetes in her paternal grandmother.     Social History   reports that she has never smoked. She has never used smokeless tobacco. She reports that she does not drink alcohol and does not use drugs.    Allergies  Allergies   Allergen Reactions   • Cephalexin Rash     .   • Clindamycin Rash     .   • Methylprednisolone Unspecified     Anxious   • Metoprolol Rash     .   • Norco [Hydrocodone-Acetaminophen] Nausea       Medications  Prior to Admission  Medications   Prescriptions Last Dose Informant Patient Reported? Taking?   amLODIPine (NORVASC) 10 MG Tab 5/18/2021 at Unknown time Patient No No   Sig: Take 1 tablet by mouth every evening.   carvedilol (COREG) 25 MG Tab 5/18/2021 at Unknown time Patient No No   Sig: Take 1 tablet by mouth 2 times a day with meals.   cloNIDine (CATAPRES) 0.1 MG/24HR PATCH WEEKLY patch 5/18/2021 at Unknown time Patient Yes No   Sig: Place 1 Patch on the skin every 7 days. On Wed   dapsone 100 MG Tab 5/18/2021 at Unknown time  No No   Sig: Take 1 tablet by mouth every day for 30 days.   hydroxychloroquine (PLAQUENIL) 200 MG Tab 5/18/2021 at Unknown time Patient Yes No   Sig: Take 200 mg by mouth every evening.   losartan (COZAAR) 25 MG Tab 5/18/2021 at Unknown time Patient No No   Sig: Take 1 tablet by mouth every day.   Patient taking differently: Take 25 mg by mouth every evening.   mycophenolate (MYFORTIC) 180 MG EC tablet 5/18/2021 at Unknown time Patient No No   Sig: Take 1 Tab by mouth every evening.   ondansetron (ZOFRAN ODT) 4 MG TABLET DISPERSIBLE 5/18/2021 at Unknown time Patient No No   Sig: Take 1 tablet by mouth every four hours as needed for Nausea (give PO if no IV route available).   predniSONE (DELTASONE) 5 MG Tab 5/18/2021 at Unknown time Patient Yes No   Sig: Take 5 mg by mouth every evening.   sucralfate (CARAFATE) 1 GM/10ML Suspension 5/18/2021 at Unknown time Patient No No   Sig: Take 10 mL by mouth 4 Times a Day,Before Meals and at Bedtime.   Patient taking differently: Take 1 g by mouth 2 times a day.      Facility-Administered Medications: None       Physical Exam  Temp:  [36.6 °C (97.9 °F)] 36.6 °C (97.9 °F)  Pulse:  [] 98  Resp:  [18-24] 18  BP: (173-203)/(120-135) 173/120  SpO2:  [79 %-100 %] 100 %    Physical Exam  Constitutional:       Appearance: Normal appearance.   HENT:      Head: Normocephalic and atraumatic.      Mouth/Throat:      Mouth: Mucous membranes are moist.      Pharynx: Oropharynx  is clear.   Eyes:      Extraocular Movements: Extraocular movements intact.      Pupils: Pupils are equal, round, and reactive to light.   Cardiovascular:      Rate and Rhythm: Regular rhythm. Tachycardia present.      Heart sounds: Normal heart sounds.   Pulmonary:      Effort: Respiratory distress (Mild) present.      Breath sounds: Rales (Bibasilar) present. No wheezing or rhonchi.   Abdominal:      General: Abdomen is flat. Bowel sounds are normal.      Palpations: Abdomen is soft.   Musculoskeletal:      Cervical back: Normal range of motion and neck supple.   Skin:     General: Skin is warm and dry.   Neurological:      General: No focal deficit present.      Mental Status: She is alert and oriented to person, place, and time.   Psychiatric:         Mood and Affect: Mood normal.         Behavior: Behavior normal.         Laboratory:  Recent Labs     05/19/21  0425   WBC 6.5   RBC 3.52*   HEMOGLOBIN 10.1*   HEMATOCRIT 34.1*   MCV 96.9   MCH 28.7   MCHC 29.6*   RDW 69.4*   PLATELETCT 141*   MPV 10.2     Recent Labs     05/19/21  0425   SODIUM 131*   POTASSIUM 5.3   CHLORIDE 89*   CO2 30   GLUCOSE 102*   BUN 40*   CREATININE 5.22*   CALCIUM 9.1     Recent Labs     05/19/21  0425   GLUCOSE 102*         No results for input(s): NTPROBNP in the last 72 hours.      Recent Labs     05/19/21  0425   TROPONINT 595*       Imaging:  DX-CHEST-PORTABLE (1 VIEW)   Final Result         1.  Pulmonary edema and/or infiltrates are identified, which appear somewhat increased since the prior exam.   2.  Cardiomegaly        EKG: Sinus tachycardia 111 bpm.  Normal axis.  No acute ST elevation.  This is meant to petition.    Assessment/Plan:  I anticipate this patient will require at least two midnights for appropriate medical management, necessitating inpatient admission.    * Acute on chronic respiratory failure (HCC)  Assessment & Plan  -Inpatient status to the ICU.  -At baseline, patient requires 5 L of supplemental oxygen to  maintain O2 saturations above 90%, currently at 45 L heated high flow.  -She also has upper GI bleed, once again vomiting blood.  -Complex patient requiring multiple interventions from GI.  Please call GI consultants for formal GI consult later today.  -We will start her on Protonix and order serial H&H.      Acute pulmonary edema (HCC)  Assessment & Plan  -Patient has pulmonary edema and infiltrates identified on chest x-ray.  -We will arrange for hemodialysis later today.  -I appreciate nephrology consult and recommendations.  -Her EF was 55% on 5/13/2021.    Upper GI bleed- (present on admission)  Assessment & Plan  -Reported 3 episodes of bloody emesis earlier in the day.  -She will need a formal GI consult.  Please call GI consultants.  -Serial H&H and Protonix IV.    Thrombocytopenia ([facility])- (present on admission)  Assessment & Plan  -On admission, platelets are 141,000.  Monitor morning labs.     ESRD (end stage renal disease) on dialysis ([facility])- (present on admission)  Assessment & Plan  -Monday, Wednesday and Friday, Dr. Trent.  Will call to arrange dialysis.  -End-stage renal disease under the context of pulmonary edema with fluid overload in need of diuresis.  -Potassium on admission 5.3.    Immunosuppression (HCC)- (present on admission)  Assessment & Plan  Continue Plaquenil, mycophenolate and prednisone.    Lactic acidosis- (present on admission)  Assessment & Plan  -On admission, lactic acid 2.1.  Monitor labs and avoid giving additional IV fluids    Situational anxiety- (present on admission)  Assessment & Plan  -Just added Ativan for now.    Hyponatremia- (present on admission)  Assessment & Plan  -Sodium is 131 on admission.  Under the context of fluid overload.  Monitor morning labs.      -The patient is critically ill.  -Patient continues to have: Acute on chronic respiratory failure, lupus on immunotherapy and upper GI bleed  -The vital organ system that is affected is the: GI, respiratory,  systemic, multiorgan.  -If untreated there is a high chance of deterioration into: Worsening shortness of breath, worsening respiratory failure, respiratory arrest, cardiac arrest, metabolic imbalance, major GI bleed, hypovolemic shock and eventually death.  -Critical care services that I am providing today is: Patient is on high flow, will need close monitoring of her GI status  -The critical that has been undertaken is medically complex.  -There has been no overlapping critical care time.  -Critical care time not including procedures:47 min

## 2021-05-19 NOTE — PROGRESS NOTES
"Pharmacy Kinetics 25 y.o. female on vancomycin day # 1 2021    Currently on Vancomycin 1500 mg IV Loading dose this afternoon  Provider specified end date: TBD    Indication for Treatment: Pneumonia    Pertinent history per medical record: Admitted on 2021 for shortness of breath with a history of Lupus on immunotherapy, ESRD on hemodialysis with multiple admissions for respiratory or GI bleeding.    Other antibiotics: Cefepime 1 Gm IV every 24 hours adjusted for renal function.    Allergies: Cephalexin, Clindamycin, Methylprednisolone, Metoprolol, and Norco [hydrocodone-acetaminophen]     List concerns for renal function:  ESRD    Pertinent cultures to date:   21 right lower lobe lung Cx    MRSA nares swab if pneumonia is a concern (ordered/positive/negative/n-a): Ordered    Recent Labs     21  0425   WBC 6.5   NEUTSPOLYS 88.30*     Recent Labs     21  0425   BUN 40*   CREATININE 5.22*     No results for input(s): VANCOTROUGH, VANCOPEAK, VANCORANDOM in the last 72 hours.    Intake/Output Summary (Last 24 hours) at 2021 1519  Last data filed at 2021 1454  Gross per 24 hour   Intake 700 ml   Output 4000 ml   Net -3300 ml      /99   Pulse (!) 115   Temp 36.6 °C (97.9 °F) (Temporal)   Resp (!) 28   Ht 1.702 m (5' 7\")   Wt 55.2 kg (121 lb 11.1 oz)   SpO2 99%  Temp (24hrs), Av.6 °C (97.9 °F), Min:36.6 °C (97.9 °F), Max:36.6 °C (97.9 °F)      A/P     1. Next vancomycin level: 1400 hours on 21  2. Goal trough: 15-20 mcg/ml  3. Comments: Will monitor and adjust doses per protocol.  Will pulse dose ESRD patient on hemodialysis    Guillermo Tucker [facility]    "

## 2021-05-19 NOTE — PROGRESS NOTES
Med rec complete per [facility] and Mother by phone.   Pt unable to review.   Per Mother Pt on Prednisone, Myfortic, Zofran, and Pepcid.   I was unable to verify Myfortic filled at pharmacy recently..   Allergies historical.

## 2021-05-19 NOTE — ASSESSMENT & PLAN NOTE
-- Remote hx of nida thao tear, gastropathy, and AVMs   -- H/ H stable with normal color stool    -- GI consulted   -- Change protonix infusion to 40 IV BID    -- Trend H/H q6hr    -- Type and screen   -- Goal Hb > 7 or signs of active bleed

## 2021-05-19 NOTE — ASSESSMENT & PLAN NOTE
-- Appears hypervolemia and needs aggressive volume removal via HD    -- Case d/w Dr. Metz   -- Daily labs

## 2021-05-19 NOTE — ED NOTES
Lab called with critical result of Troponin at 595. Critical lab result read back.   Dr. Fink notified of critical lab.

## 2021-05-19 NOTE — ASSESSMENT & PLAN NOTE
-- Secondary to SLE and possible bleed   -- Goal PLT > 10k unless there is evidence of true bleed

## 2021-05-19 NOTE — ASSESSMENT & PLAN NOTE
-- Appears hypervolemia along with symptoms (ie, orthopnea and PND) supportive of pulmonary edema   -- Cont aggressive HD with volume removal   -- Strict I/O   -- Fluid restriction less than 1.5 liters

## 2021-05-19 NOTE — PROGRESS NOTES
0830 - Pt admitted to  322 from ER via gurney.  A&O x4.  SEPULVEDA, asst to transfer to bed via slideboard with asst x 3. Pt is A&O x 4, fatigued with flat affect. Pt oriented to , bed controls, call light.  Fall precautions in effect.  Pt c/o chronic low back pain and mild HA, state takes tylenol at home for same, declines intervention at this time. Pt denies other pain, nausea at this time.  Assessment completed, see flowsheet.  poc reviewed with pt, pt vu, all questions answered.  Tele = st, HTN noted, vss    Dialysis RN at bs.    1715 - pt to transfer to  312-2.  Report called to Felecia MARTINEZ who is to assume care.

## 2021-05-19 NOTE — ASSESSMENT & PLAN NOTE
-- On cellcept, hydroxychloroquine, and prednisone   -- Monitor closely while on abx   -- If cx positive for PNA then will hold cellcept

## 2021-05-19 NOTE — DISCHARGE PLANNING
Outpatient Dialysis Note     Confirmed patient is active at:     [address] [address]     Schedule: Monday, Wednesday, Friday   Time: 06:00am      Patient is seen by Dr. Trent in HD clinic.     Spoke with Leila at facility who confirmed.     Forwarded records for review.     Mary Han- Dialysis Coordinator # [phone]  [facility]

## 2021-05-19 NOTE — FLOWSHEET NOTE
05/19/21 1000   Events/Summary/Plan   Events/Summary/Plan Decrease FiO2   Vital Signs   Pulse 98   Respiration (!) 31   Pulse Oximetry 98 %   $ Pulse Oximetry (Spot Check) Yes   Oxygen   O2 (LPM) 40   FiO2% 70 %   O2 Delivery Device Heated High Flow Nasal Cannula   Aerosols   $ Aerosol Delivery Device Heated High Flow Nasal Cannula   Aerosol Temperature 31 °C (87.8 °F)

## 2021-05-19 NOTE — ED PROVIDER NOTES
ED Provider Note    ER Provider Note         CHIEF COMPLAINT  Chief Complaint   Patient presents with   • Shortness of Breath       HPI  Lily Nicole is a 25 y.o. female who presents to the Emergency Department with worsening shortness of breath.  The patient says around 1:00 today things got worse.  She says she is having a lot of difficulty breathing.  She denies any fevers or chills.  She denies any cough.  She says that she is having some pain because she is breathing so often.  She denies any diarrhea and there is no stomach pain.  There is no vomiting.  She says she is feeling quite anxious as well.    REVIEW OF SYSTEMS  See HPI for further details. All other systems are negative.     PAST MEDICAL HISTORY   has a past medical history of Anemia (01/17/2018), AVF (arteriovenous fistula) ([facility]), Chest pain, Chest tightness, Daytime sleepiness, Dialysis patient ([facility]), Difficulty breathing, ESRD (end stage renal disease) on dialysis ([facility]) (01/17/2018), Gasping for breath, Heart burn, Hypertension (01/17/2018), Indigestion, Lupus ([facility]), Migraines (01/17/2018), Painful breathing, Palpitations, Seizure ([facility]) (2013), Shortness of breath, Swelling of lower extremity, and Wheezing.    SURGICAL HISTORY   has a past surgical history that includes ronak by laparoscopy (4/5/2010); av fistula creation (Right); angioplasty (01/17/2018); other; other; gastroscopy-endo (12/9/2019); gastroscopy (N/A, 5/30/2020); gastroscopy-endo (9/18/2020); upper gi endoscopy,ctrl bleed (11/12/2020); upper gi endoscopy,biopsy (11/12/2020); gastroscopy-endo (11/12/2020); colonoscopy,diagnostic (1/8/2021); upper gi endoscopy,diagnosis (1/8/2021); upper gi endoscopy,ctrl bleed (1/8/2021); upper gi endoscopy,diagnosis (3/5/2021); upper gi endoscopy,diagnosis (N/A, 3/19/2021); upper gi endoscopy,ctrl bleed (3/19/2021); and bronchoscopy,diagnostic (Bilateral, 5/13/2021).    SOCIAL HISTORY  Social History     Tobacco Use   • Smoking status: Never  "Smoker   • Smokeless tobacco: Never Used   Vaping Use   • Vaping Use: Never used   Substance Use Topics   • Alcohol use: No   • Drug use: No      Social History     Substance and Sexual Activity   Drug Use No       FAMILY HISTORY  Family History   Problem Relation Age of Onset   • Diabetes Paternal Grandmother        CURRENT MEDICATIONS  Home Medications     Reviewed by Lisa Zepeda R.N. (Registered Nurse) on 05/19/21 at 0406  Med List Status: Not Addressed   Medication Last Dose Status   amLODIPine (NORVASC) 10 MG Tab 5/18/2021 Active   carvedilol (COREG) 25 MG Tab 5/18/2021 Active   cloNIDine (CATAPRES) 0.1 MG/24HR PATCH WEEKLY patch 5/18/2021 Active   dapsone 100 MG Tab 5/18/2021 Active   hydroxychloroquine (PLAQUENIL) 200 MG Tab 5/18/2021 Active   losartan (COZAAR) 25 MG Tab 5/18/2021 Active   mycophenolate (MYFORTIC) 180 MG EC tablet 5/18/2021 Active   ondansetron (ZOFRAN ODT) 4 MG TABLET DISPERSIBLE 5/18/2021 Active   predniSONE (DELTASONE) 5 MG Tab 5/18/2021 Active   sucralfate (CARAFATE) 1 GM/10ML Suspension 5/18/2021 Active                ALLERGIES  Allergies   Allergen Reactions   • Cephalexin Rash     .   • Clindamycin Rash     .   • Methylprednisolone Unspecified     Anxious   • Metoprolol Rash     .   • Norco [Hydrocodone-Acetaminophen] Nausea       PHYSICAL EXAM  VITAL SIGNS: BP (!) 194/130   Pulse (!) 101   Temp 36.6 °C (97.9 °F) (Temporal)   Resp 18   Ht 1.651 m (5' 5\")   LMP 05/02/2021 (Exact Date)   SpO2 100%   BMI 20.14 kg/m²      Constitutional: Alert in moderate distress, chronically ill-appearing  HENT: No signs of trauma, Bilateral external ears normal, Nose normal.   Eyes: Pupils are equal and reactive, Conjunctiva normal, Non-icteric.   Neck: Normal range of motion, No tenderness, Supple, No stridor.   Lymphatic: No lymphadenopathy noted.   Cardiovascular: Regular rate and rhythm, nondisplaced PMI  Thorax & Lungs: Diminished lung sounds throughout   abdomen: Bowel sounds " "normal, Soft, No tenderness, No masses, No pulsatile masses. No peritoneal signs.  Skin: Discolored skin throughout her body and face with no erythema.   Back: No bony tenderness, No CVA tenderness.   Extremities: Intact distal pulses, No edema, No tenderness, No cyanosis.  Musculoskeletal: Good range of motion in all major joints. No tenderness to palpation or major deformities noted.   Neurologic: Alert , Normal motor function, Normal sensory function, No focal deficits noted.   Psychiatric: Affect normal, Judgment normal, Mood normal.     DIAGNOSTIC STUDIES / PROCEDURES    EKG Interpretation:  Interpreted by me  Sinus tachycardia rate of 111 with no ST elevation, depression or pathologic T waves.       LABS  Labs Reviewed   CBC WITH DIFFERENTIAL - Abnormal; Notable for the following components:       Result Value    RBC 3.52 (*)     Hemoglobin 10.1 (*)     Hematocrit 34.1 (*)     MCHC 29.6 (*)     RDW 69.4 (*)     Platelet Count 141 (*)     All other components within normal limits   BASIC METABOLIC PANEL - Abnormal; Notable for the following components:    Sodium 131 (*)     Chloride 89 (*)     Glucose 102 (*)     Bun 40 (*)     Creatinine 5.22 (*)     All other components within normal limits   TROPONIN - Abnormal; Notable for the following components:    Troponin T 595 (*)     All other components within normal limits   LACTIC ACID - Abnormal; Notable for the following components:    Lactic Acid 2.1 (*)     All other components within normal limits   ESTIMATED GFR - Abnormal; Notable for the following components:    GFR If  12 (*)     GFR If Non  10 (*)     All other components within normal limits   BLOOD CULTURE    Narrative:     Per Hospital Policy: Only change Specimen Src: to \"Line\" if  specified by physician order.   BLOOD CULTURE    Narrative:     Per Hospital Policy: Only change Specimen Src: to \"Line\" if  specified by physician order.   HCG QUAL SERUM   COV-2, FLU A/B, " AND RSV BY PCR    Narrative:     Have you been in close contact with a person who is suspected  or known to be positive for COVID-19 within the last 30 days  (e.g. last seen that person < 30 days ago)->No   COD (ADULT)       All labs reviewed by me.    RADIOLOGY  DX-CHEST-PORTABLE (1 VIEW)   Final Result         1.  Pulmonary edema and/or infiltrates are identified, which appear somewhat increased since the prior exam.   2.  Cardiomegaly           The radiologist's interpretation of all radiological studies have been reviewed by me.    COURSE & MEDICAL DECISION MAKING  Pertinent Labs & Imaging studies reviewed. (See chart for details)    This is a 25 y.o. female that presents with a history of end-stage renal disease as well as autoimmune disease who presents very ill tachypneic.  At this time the patient will need high flow nasal cannula.  She also is appears anxious.  This could resent myocardial ischemia versus volume overload versus sepsis.  We will get lactic acid as well as blood cultures.  Will check the patient pregnancy and evaluate for electrolyte derangements.  We will also get a screening troponin and EKG..     4:24 AM - Patient seen and examined at bedside.         5:33 AM-patient had episode of vomiting up blood.  This could been coughing up blood as well.  This could be related to pulmonary edema versus GI bleed.  We will start the patient on Protonix with a drip.  We will admit the patient to the hospitalist in guarded condition.  She is requiring high flow nasal cannula.  An ultrasound-guided IV was placed peripherally into the left groin.  The patient troponin is elevated however this is her baseline.  The patient has no peak T waves.  Potassium is within normal limits.  The patient will likely be dialyzed.  Should be admitted in critical condition.    The total critical care time on this patient is 40 minutes, resuscitating patient, speaking with admitting physician, and deciphering test results.  This 40 minutes is exclusive of separately billable procedures.        FINAL IMPRESSION  1. Dyspnea, unspecified type    2. Hypoxia    3. Gastrointestinal hemorrhage, unspecified gastrointestinal hemorrhage type              Electronically signed by: Rivera Fink M.D., 5/19/2021

## 2021-05-19 NOTE — ED NOTES
Attempting to place IV at this time with no success. In process of obtaining IV access to give medications per order.

## 2021-05-19 NOTE — ASSESSMENT & PLAN NOTE
-- Secondary to hypervolemia hyponatremia   -- Needs aggressive volume removal via HD   -- Daily BMP

## 2021-05-19 NOTE — ASSESSMENT & PLAN NOTE
-- Secondary to pulmonary edema due to inadequate volume removal.Other ddx include infectious etiology.    -- Needs aggressive HD with volume removal   -- Start HAP coverage   -- Check respiratory culture   -- Actively titrating down FiO2 to maintain goal SPO2 > 88%   -- PT/OT consultation   -- Early mobility   -- Encourage IS

## 2021-05-19 NOTE — DISCHARGE PLANNING
Patient is a 25-year old women who lives with parents in [place], [place]. Patient goes to [facility] in [place] dialysis F in [place]. Has ESRD and Lupus. [facility] for blood, and O2. PCP is Dr. Matthew.       Patient is here for readmission for worsening shortness of breath. Patient on high flow 40 L at 98%. High Blood pressure per vitals.     Care coordination team to follow for discharge needs.

## 2021-05-19 NOTE — FLOWSHEET NOTE
05/19/21 1500   Events/Summary/Plan   Skin Inspection Respiratory Device Intact   Vital Signs   Pulse (!) 115   Respiration (!) 28   Pulse Oximetry 99 %   $ Pulse Oximetry (Spot Check) Yes   Respiratory Assessment   Respiratory Pattern Within Normal Limits   Level of Consciousness Alert   Breath Sounds   RUL Breath Sounds Clear   RML Breath Sounds Clear   RLL Breath Sounds Diminished   PALLAVI Breath Sounds Clear   LLL Breath Sounds Diminished   Oxygen   O2 (LPM) 5   O2 Delivery Device Silicone Nasal Cannula   Aerosols   $ Aerosol Delivery Device   (DC'd)

## 2021-05-19 NOTE — ASSESSMENT & PLAN NOTE
-Inpatient status to the ICU.  -At baseline, patient requires 5 L of supplemental oxygen to maintain O2 saturations above 90%, currently at 45 L heated high flow.  -No acute interventions per GI, they recommend stringent nausea control to prevent any retching.  -We will con't her on Protonix and order serial H&H.

## 2021-05-19 NOTE — FLOWSHEET NOTE
05/19/21 0425   Events/Summary/Plan   Events/Summary/Plan [facility] Started   Vent Settings   FiO2% 100 %

## 2021-05-19 NOTE — CONSULTS
Critical Care Consultation    Date of consult: 5/19/2021    Referring Physician  Dr. Madison     Reason for Consultation  Acute hypoxemia respiratory failure     History of Presenting Illness  25 y.o. female who presented 5/19/2021 with acute on chronic hypoxemia respiratory failure. Patient with significant history of lupus nephritis requiring HD every M/W/F, SLE follow by Dr. Wong, HTN, seizure, recurrent nida thao tear, thrombocytopenia, and chronic anemia presents with hematemesis and worsening shortness of breath. Patient was recent discharged from [facility] 6 days ago for shortness of breath workup. She underwent bronchoscopy and BAL cultures are negative to date. Since hospital discharge she has been doing quite well until earlier this morning she developed worsening cough follow by few episodes of hematemesis. Associated with worsening shortness of breath, orthopnea, PND, chest pain, non productive cough. Denies fever/chills, diarrhea, recent sick contact, or travel history. She has been getting her scheduled HD with volume removal.       On admission, Hb 10.1 and lactic acid 2.1. CXR personally reviewed -> bibasilar airspace disease with blunted costophrenic angles and cephalization. She was noted to have worsening hypoxemia requiring HFNC. 50/50%. Admitted to ICU for further management.     In ICU, she was hypertensive with HR ~100s. Complains of chest pain which is chronic and worsen with inspiration. Nephrology consulted and HD initiated with plan for 3.5 liters removed. GI consulted and recommended no intervention at this time beside close monitoring. Started HAP coverage.     Code Status  Full Code    Review of Systems  Review of Systems   Constitutional: Positive for malaise/fatigue. Negative for chills, fever and weight loss.   Respiratory: Positive for cough, shortness of breath and wheezing. Negative for hemoptysis and sputum production.    Cardiovascular: Positive for chest pain, palpitations,  orthopnea and PND. Negative for claudication and leg swelling.   Gastrointestinal: Positive for abdominal pain, nausea and vomiting. Negative for blood in stool and diarrhea.   Musculoskeletal: Positive for back pain, myalgias and neck pain.   All other systems reviewed and are negative.      Past Medical History   has a past medical history of Anemia (01/17/2018), AVF (arteriovenous fistula) ([facility]), Chest pain, Chest tightness, Daytime sleepiness, Dialysis patient ([facility]), Difficulty breathing, ESRD (end stage renal disease) on dialysis ([facility]) (01/17/2018), Gasping for breath, Heart burn, Hypertension (01/17/2018), Indigestion, Lupus ([facility]), Migraines (01/17/2018), Painful breathing, Palpitations, Seizure ([facility]) (2013), Shortness of breath, Swelling of lower extremity, and Wheezing. She also has no past medical history of Apnea, sleep, Cough, Fainting, Insomnia, Morning headache, Snoring, or Sputum production.    Surgical History   has a past surgical history that includes ronak by laparoscopy (4/5/2010); av fistula creation (Right); angioplasty (01/17/2018); other; other; gastroscopy-endo (12/9/2019); gastroscopy (N/A, 5/30/2020); gastroscopy-endo (9/18/2020); pr upper gi endoscopy,ctrl bleed (11/12/2020); pr upper gi endoscopy,biopsy (11/12/2020); gastroscopy-endo (11/12/2020); pr colonoscopy,diagnostic (1/8/2021); pr upper gi endoscopy,diagnosis (1/8/2021); pr upper gi endoscopy,ctrl bleed (1/8/2021); pr upper gi endoscopy,diagnosis (3/5/2021); pr upper gi endoscopy,diagnosis (N/A, 3/19/2021); pr upper gi endoscopy,ctrl bleed (3/19/2021); and pr bronchoscopy,diagnostic (Bilateral, 5/13/2021).    Family History  family history includes Diabetes in her paternal grandmother.    Social History   reports that she has never smoked. She has never used smokeless tobacco. She reports that she does not drink alcohol and does not use drugs.    Medications  Home Medications     Reviewed by Lisa Zepeda R.N. (Registered  Nurse) on 05/19/21 at 0406  Med List Status: Not Addressed   Medication Last Dose Status   amLODIPine (NORVASC) 10 MG Tab 5/18/2021 Active   carvedilol (COREG) 25 MG Tab 5/18/2021 Active   cloNIDine (CATAPRES) 0.1 MG/24HR PATCH WEEKLY patch 5/18/2021 Active   dapsone 100 MG Tab 5/18/2021 Active   hydroxychloroquine (PLAQUENIL) 200 MG Tab 5/18/2021 Active   losartan (COZAAR) 25 MG Tab 5/18/2021 Active   mycophenolate (MYFORTIC) 180 MG EC tablet 5/18/2021 Active   ondansetron (ZOFRAN ODT) 4 MG TABLET DISPERSIBLE 5/18/2021 Active   predniSONE (DELTASONE) 5 MG Tab 5/18/2021 Active   sucralfate (CARAFATE) 1 GM/10ML Suspension 5/18/2021 Active              Current Facility-Administered Medications   Medication Dose Route Frequency Provider Last Rate Last Admin   • SODIUM CHLORIDE 0.9 % IV SOLN            • pantoprazole (PROTONIX) 80 mg in  mL Infusion  8 mg/hr Intravenous Continuous Rivera Fink M.D.       • amLODIPine (NORVASC) tablet 10 mg  10 mg Oral Q EVENING Argentina Chase M.D.       • carvedilol (COREG) tablet 25 mg  25 mg Oral BID WITH MEALS Argentina Chase M.D.       • hydroxychloroquine (Plaquenil) tablet 200 mg  200 mg Oral Q EVENING Argentina Chase M.D.       • losartan (COZAAR) tablet 25 mg  25 mg Oral Q EVENING Argentina Chase M.D.       • predniSONE (DELTASONE) tablet 5 mg  5 mg Oral Q EVENING Argentina Chase M.D.       • sucralfate (CARAFATE) 1 GM/10ML suspension 1 g  1 g Oral BID Argentina Chase M.D.       • acetaminophen (Tylenol) tablet 650 mg  650 mg Oral Q6HRS PRN Argentina Chase M.D.       • ondansetron (ZOFRAN) syringe/vial injection 4 mg  4 mg Intravenous Q4HRS PRN Argentina Chase M.D.       • ondansetron (ZOFRAN ODT) dispertab 4 mg  4 mg Oral Q4HRS PRN Argentina Chase M.D.       • promethazine (PHENERGAN) tablet 12.5-25 mg  12.5-25 mg Oral Q4HRS PRN Argentina Chase M.D.       • promethazine (PHENERGAN)  suppository 12.5-25 mg  12.5-25 mg Rectal Q4HRS PRN Argentina Chase M.D.       • prochlorperazine (COMPAZINE) injection 5-10 mg  5-10 mg Intravenous Q4HRS PRN Argentina Chase M.D.       • senna-docusate (PERICOLACE or SENOKOT S) 8.6-50 MG per tablet 2 tablet  2 tablet Oral BID Argentina Chase M.D.        And   • polyethylene glycol/lytes (MIRALAX) PACKET 1 Packet  1 Packet Oral QDAY PRN Argentina Chase M.D.        And   • magnesium hydroxide (MILK OF MAGNESIA) suspension 30 mL  30 mL Oral QDAY PRN Argentina Chase M.D.        And   • bisacodyl (DULCOLAX) suppository 10 mg  10 mg Rectal QDAY PRN Argentina Chase M.D.       • Respiratory Therapy Consult   Nebulization Continuous RT Argentina Chase M.D.       • pantoprazole (PROTONIX) injection 40 mg  40 mg Intravenous DAILY Argentina Chase M.D.       • mycophenolate (CELLCEPT) capsule 250 mg  250 mg Oral Q EVENING Marian Piña M.D.           Allergies  Allergies   Allergen Reactions   • Cephalexin Rash     .   • Clindamycin Rash     .   • Methylprednisolone Unspecified     Anxious   • Metoprolol Rash     .   • Norco [Hydrocodone-Acetaminophen] Nausea       Vital Signs last 24 hours  Temp:  [36.6 °C (97.9 °F)] 36.6 °C (97.9 °F)  Pulse:  [] 52  Resp:  [18-27] 27  BP: (168-203)/(120-135) 168/120  SpO2:  [79 %-100 %] 93 %    Physical Exam  Physical Exam  Constitutional:       Appearance: She is ill-appearing.   HENT:      Head: Normocephalic.      Nose: Nose normal.   Eyes:      Extraocular Movements: Extraocular movements intact.   Cardiovascular:      Rate and Rhythm: Regular rhythm. Tachycardia present.      Pulses: Normal pulses.      Heart sounds: Murmur heard.     Pulmonary:      Effort: Pulmonary effort is normal.      Breath sounds: Rales present.   Abdominal:      General: Abdomen is flat. Bowel sounds are normal.      Palpations: Abdomen is soft.      Comments: Mild discomfort around epigastric  region     Musculoskeletal:         General: Normal range of motion.      Cervical back: Normal range of motion.      Right lower leg: No edema.      Left lower leg: No edema.   Skin:     General: Skin is warm.      Capillary Refill: Capillary refill takes more than 3 seconds.   Neurological:      Mental Status: She is alert and oriented to person, place, and time.      Cranial Nerves: No cranial nerve deficit.      Sensory: No sensory deficit.   Psychiatric:         Mood and Affect: Mood normal.         Fluids  No intake or output data in the 24 hours ending 05/19/21 0831    Laboratory  Recent Results (from the past 48 hour(s))   COV-2, FLU A/B, AND RSV BY PCR (2-4 HOURS CEPHEID): Collect [facility] swab in [facility]    Collection Time: 05/19/21  4:20 AM    Specimen: Respirate   Result Value Ref Range    Influenza virus A RNA Negative Negative    Influenza virus B, PCR Negative Negative    RSV, PCR Negative Negative    SARS-CoV-2 by PCR NotDetected     SARS-CoV-2 Source NP Swab    CBC WITH DIFFERENTIAL    Collection Time: 05/19/21  4:25 AM   Result Value Ref Range    WBC 6.5 4.8 - 10.8 K/uL    RBC 3.52 (L) 4.20 - 5.40 M/uL    Hemoglobin 10.1 (L) 12.0 - 16.0 g/dL    Hematocrit 34.1 (L) 37.0 - 47.0 %    MCV 96.9 81.4 - 97.8 fL    MCH 28.7 27.0 - 33.0 pg    MCHC 29.6 (L) 33.6 - 35.0 g/dL    RDW 69.4 (H) 35.9 - 50.0 fL    Platelet Count 141 (L) 164 - 446 K/uL    MPV 10.2 9.0 - 12.9 fL    Neutrophils-Polys 88.30 (H) 44.00 - 72.00 %    Lymphocytes 5.00 (L) 22.00 - 41.00 %    Monocytes 5.00 0.00 - 13.40 %    Eosinophils 0.60 0.00 - 6.90 %    Basophils 0.60 0.00 - 1.80 %    Immature Granulocytes 0.50 0.00 - 0.90 %    Nucleated RBC 0.00 /100 WBC    Neutrophils (Absolute) 5.71 2.00 - 7.15 K/uL    Lymphs (Absolute) 0.32 (L) 1.00 - 4.80 K/uL    Monos (Absolute) 0.32 0.00 - 0.85 K/uL    Eos (Absolute) 0.04 0.00 - 0.51 K/uL    Baso (Absolute) 0.04 0.00 - 0.12 K/uL    Immature Granulocytes (abs) 0.03 0.00 - 0.11 K/uL    NRBC (Absolute) 0.00  K/uL    Hypochromia 1+     Anisocytosis 2+ (A)     Macrocytosis 1+     Microcytosis 1+    BASIC METABOLIC PANEL    Collection Time: 05/19/21  4:25 AM   Result Value Ref Range    Sodium 131 (L) 135 - 145 mmol/L    Potassium 5.3 3.6 - 5.5 mmol/L    Chloride 89 (L) 96 - 112 mmol/L    Co2 30 20 - 33 mmol/L    Glucose 102 (H) 65 - 99 mg/dL    Bun 40 (H) 8 - 22 mg/dL    Creatinine 5.22 (HH) 0.50 - 1.40 mg/dL    Calcium 9.1 8.4 - 10.2 mg/dL    Anion Gap 12.0 7.0 - 16.0   TROPONIN    Collection Time: 05/19/21  4:25 AM   Result Value Ref Range    Troponin T 595 (H) 6 - 19 ng/L   LACTIC ACID    Collection Time: 05/19/21  4:25 AM   Result Value Ref Range    Lactic Acid 2.1 (H) 0.5 - 2.0 mmol/L   BETA-HCG QUALITATIVE SERUM    Collection Time: 05/19/21  4:25 AM   Result Value Ref Range    Beta-Hcg Qualitative Serum Negative Negative   COD - Adult (Type and Screen)    Collection Time: 05/19/21  4:25 AM   Result Value Ref Range    ABO Grouping Only O     Rh Grouping Only POS     Antibody Screen-Cod NEG    ESTIMATED GFR    Collection Time: 05/19/21  4:25 AM   Result Value Ref Range    GFR If  12 (A) >60 mL/min/1.73 m 2    GFR If Non African American 10 (A) >60 mL/min/1.73 m 2   PLATELET ESTIMATE    Collection Time: 05/19/21  4:25 AM   Result Value Ref Range    Plt Estimation Normal    MORPHOLOGY    Collection Time: 05/19/21  4:25 AM   Result Value Ref Range    RBC Morphology Present     Large Platelets 1+     Polychromia 1+     Poikilocytosis 1+     Ovalocytes 1+    DIFFERENTIAL COMMENT    Collection Time: 05/19/21  4:25 AM   Result Value Ref Range    Comments-Diff see below    EKG    Collection Time: 05/19/21  5:22 AM   Result Value Ref Range    Report       [facility] Emergency Dept.    Test Date:  2021-05-19  Pt Name:    CASE WOODSON            Department: [facility]  MRN:        [id number]                      [facility]  Gender:     Female                       Technician:  [id number]  :        [id number]                   Requested By:MATEUS RAWLS  Order #:    [id number]                    Reading MD:    Measurements  Intervals                                Axis  Rate:       111                          P:          48  WI:         144                          QRS:        -11  QRSD:       76                           T:          134  QT:         340  QTc:        462    Interpretive Statements  SINUS TACHYCARDIA  PROBABLE LEFT ATRIAL ABNORMALITY  PROBABLE LVH WITH SECONDARY REPOL ABNRM  Compared to ECG 2021 05:14:25  Sinus rhythm no longer present  Intraventricular conduction delay no longer present         Imaging  DX-CHEST-PORTABLE (1 VIEW)   Final Result         1.  Pulmonary edema and/or infiltrates are identified, which appear somewhat increased since the prior exam.   2.  Cardiomegaly          Assessment/Plan  * Acute on chronic respiratory failure (HCC)  Assessment & Plan  -- Secondary to pulmonary edema due to inadequate volume removal.Other ddx include infectious etiology.    -- Needs aggressive HD with volume removal   -- Start HAP coverage   -- Check respiratory culture   -- Actively titrating down FiO2 to maintain goal SPO2 > 88%   -- PT/OT consultation   -- Early mobility   -- Encourage IS       Immunosuppression (HCC)- (present on admission)  Assessment & Plan  -- On cellcept, hydroxychloroquine, and prednisone   -- Monitor closely while on abx   -- If cx positive for PNA then will hold cellcept       Acute pulmonary edema (HCC)  Assessment & Plan  -- Appears hypervolemia along with symptoms (ie, orthopnea and PND) supportive of pulmonary edema   -- Cont aggressive HD with volume removal   -- Strict I/O   -- Fluid restriction less than 1.5 liters       Thrombocytopenia (HCC)- (present on admission)  Assessment & Plan  -- Secondary to SLE and possible bleed   -- Goal PLT > 10k unless there is evidence of true bleed       Upper GI bleed- (present on  admission)  Assessment & Plan  -- Remote hx of nida thao tear, gastropathy, and AVMs   -- H/ H stable with normal color stool    -- GI consulted   -- Change protonix infusion to 40 IV BID    -- Trend H/H q6hr    -- Type and screen   -- Goal Hb > 7 or signs of active bleed       Hyponatremia- (present on admission)  Assessment & Plan  -- Secondary to hypervolemia hyponatremia   -- Needs aggressive volume removal via HD   -- Daily BMP       ESRD (end stage renal disease) on dialysis (HCC)- (present on admission)  Assessment & Plan  -- Appears hypervolemia and needs aggressive volume removal via HD    -- Case d/w Dr. Metz   -- Daily labs       Given her recurrent hospital admissions, patient will benefit from transferring to [facility] care for further evaluation and treatment. Will initiate transfer process once her hypoxemia improve.      Discussed patient condition and risk of morbidity and/or mortality with Hospitalist, Family, RN, RT, Pharmacy and Patient.

## 2021-05-19 NOTE — ED TRIAGE NOTES
Pt states she woke up and was having SOB. Per EMS, pt was tachypnic and stating 90% on 8L NC; normally on 5L NC at home. Pt was placed on NR mask at 15L and stating %. Pt is AOx4 no pain at this time. Hx dialysis M/W/Fr. Last dialysis was Monday 05/17/2021

## 2021-05-19 NOTE — PROGRESS NOTES
Hd treatment ordered by Dr Metz started at 1130 and ended at 1430 with net UF of 3.5 liters bas bp tolerated. Cannulated right upper arm AVF x 2 using 15 gauge needles without problem. Dr Piña was in and aware of pt's BP and verbalized he is not worried unless systolic bp goes above 180.  Pt has been hypertensive pre and intra hd treatment and stabilized post treatment with bp of 128/102 mm hg. See flow sheet for details.

## 2021-05-19 NOTE — ASSESSMENT & PLAN NOTE
-Reported 3 episodes of bloody emesis on day of admit.    -Serial H&H and Protonix IV.  D/W Dr. Valentino 5/19.  He anticipates no intervention.

## 2021-05-19 NOTE — CONSULTS
Nephrology Consultation    Date of Service  5/19/2021    Referring Physician  Marian Piña M.D.    Consulting Physician  Navin Metz M.D.    Reason for Consultation  Management of ESRD on HD      History of Presenting Illness  25 y.o. female with systemic lupus erythematosus, ESRD on hemodialysis Monday Wednesday Friday who presented 5/19/2021 with hemoptysis and hematemesis.    The patient was in her usual state of health.  Last night, she awoke feeling suddenly short of breath.  She started coughing up blood, and then vomited up blood.  She told her family that she could not breathe, and they called 911 and brought her into the hospital.  She was found to have accelerated hypertension and volume overload on chest x-ray.  The patient was dialyzed today, and 3 L removed, and she feels less short of breath now.  She is unsure if she is planning to get an EGD with gastroenterology.  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    Regarding the patient's kidney failure, she is on dialysis Monday Wednesday Friday.  She is oliguric.  She dialyzes via right arm fistula which works well.    Regarding her lupus, she is under the care of Dr. Wong, her rheumatologist.  She is on weekly Benlysta subcutaneous injections.    Review of Systems  Review of Systems   Constitutional: Negative for fever.   Respiratory: Positive for shortness of breath.    Cardiovascular: Negative for chest pain.   Gastrointestinal: Negative for abdominal pain.   All other systems reviewed and are negative.      Past Medical History  Past Medical History:   Diagnosis Date   • Anemia 01/17/2018   • AVF (arteriovenous fistula) ([facility])     Right Arm   • Chest pain    • Chest tightness    • Daytime sleepiness    • Dialysis patient ([facility])      marquise, M,W,F Elizabeth/Joni   • Difficulty breathing    • ESRD (end stage renal disease) on dialysis ([facility]) 01/17/2018    Twan Fu   • Gasping for breath    • Heart burn    • Hypertension 01/17/2018     "\"Controlled with medication\"   • Indigestion    • Lupus (HCC)    • Migraines 01/17/2018   • Painful breathing    • Palpitations    • Seizure (HCC) 2013    from high blood pressure, reports 1 time event   • Shortness of breath    • Swelling of lower extremity    • Wheezing        Surgical History  Past Surgical History:   Procedure Laterality Date   • PB BRONCHOSCOPY,DIAGNOSTIC Bilateral 5/13/2021    Procedure: BRONCHOSCOPY, BRONCHOALVEOLAR LAVAGE;  Surgeon: William Spangler M.D.;  Location: [facility];  Service: Pulmonary   • PB UPPER GI ENDOSCOPY,DIAGNOSIS N/A 3/19/2021    Procedure: GASTROSCOPY;  Surgeon: Waylon Mcqueen M.D.;  Location: [facility];  Service: Gastroenterology   • PB UPPER GI ENDOSCOPY,CTRL BLEED  3/19/2021    Procedure: GASTROSCOPY, WITH ARGON PLASMA COAGULATION;  Surgeon: Waylon Mcqueen M.D.;  Location: [facility];  Service: Gastroenterology   • PB UPPER GI ENDOSCOPY,DIAGNOSIS  3/5/2021    Procedure: GASTROSCOPY - W/HEMOSTASIS;  Surgeon: Ej Silva M.D.;  Location: [facility];  Service: Gastroenterology   • PB COLONOSCOPY,DIAGNOSTIC  1/8/2021    Procedure: COLONOSCOPY;  Surgeon: Herbert Contreras M.D.;  Location: [facility];  Service: Gastroenterology   • PB UPPER GI ENDOSCOPY,DIAGNOSIS  1/8/2021    Procedure: GASTROSCOPY;  Surgeon: Herbert Contreras M.D.;  Location: [facility];  Service: Gastroenterology   • PB UPPER GI ENDOSCOPY,CTRL BLEED  1/8/2021    Procedure: GASTROSCOPY, WITH ARGON PLASMA COAGULATION;  Surgeon: Herbert Contreras M.D.;  Location: [facility];  Service: Gastroenterology   • PB UPPER GI ENDOSCOPY,CTRL BLEED  11/12/2020    Procedure: GASTROSCOPY, WITH ARGON PLASMA COAGULATION;  Surgeon: Gadiel Whitney M.D.;  Location: [facility];  Service: Gastroenterology   • PB UPPER GI ENDOSCOPY,BIOPSY  11/12/2020    Procedure: GASTROSCOPY, WITH BIOPSY;  Surgeon: Gadiel Whitney M.D.;  Location: [facility]" "[facility];  Service: Gastroenterology   • GASTROSCOPY-ENDO  11/12/2020    Procedure: GASTROSCOPY;  Surgeon: Gadiel Whitney M.D.;  Location: SURGERY [facility];  Service: Gastroenterology   • GASTROSCOPY-ENDO  9/18/2020    Procedure: GASTROSCOPY;  Surgeon: Gadiel Whitney M.D.;  Location: SURGERY [facility];  Service: Gastroenterology   • GASTROSCOPY N/A 5/30/2020    Procedure: GASTROSCOPY;  Surgeon: Waylon Mcqueen M.D.;  Location: [facility];  Service: Gastroenterology   • GASTROSCOPY-ENDO  12/9/2019    Procedure: GASTROSCOPY;  Surgeon: Aaron Kam M.D.;  Location: [facility];  Service: Gastroenterology   • ANGIOPLASTY  01/17/2018    \"Right Arm AV-Fistulagram & Angioplastyx3\"   • ULI BY LAPAROSCOPY  4/5/2010    Performed by SYED MARTELL at SURGERY [facility]   • AV FISTULA CREATION Right    • OTHER      renal biopsy x 3   • OTHER      bone marrow biopsy       Family History  Family History   Problem Relation Age of Onset   • Diabetes Paternal Grandmother        Social History  Social History     Socioeconomic History   • Marital status: Single     Spouse name: Not on file   • Number of children: Not on file   • Years of education: Not on file   • Highest education level: Not on file   Occupational History   • Not on file   Tobacco Use   • Smoking status: Never Smoker   • Smokeless tobacco: Never Used   Vaping Use   • Vaping Use: Never used   Substance and Sexual Activity   • Alcohol use: No   • Drug use: No   • Sexual activity: Not on file   Other Topics Concern   • Not on file   Social History Narrative   • Not on file     Social Determinants of Health     Financial Resource Strain: Low Risk    • Difficulty of Paying Living Expenses: Not hard at all   Food Insecurity: No Food Insecurity   • Worried About Running Out of Food in the Last Year: Never true   • Ran Out of Food in the Last Year: Never true   Transportation Needs: No Transportation Needs   • Lack of " Transportation (Medical): No   • Lack of Transportation (Non-Medical): No   Physical Activity:    • Days of Exercise per Week:    • Minutes of Exercise per Session:    Stress:    • Feeling of Stress :    Social Connections:    • Frequency of Communication with Friends and Family:    • Frequency of Social Gatherings with Friends and Family:    • Attends Faith Services:    • Active Member of Clubs or Organizations:    • Attends Club or Organization Meetings:    • Marital Status:    Intimate Partner Violence:    • Fear of Current or Ex-Partner:    • Emotionally Abused:    • Physically Abused:    • Sexually Abused:        Medications  Current Facility-Administered Medications   Medication Dose Route Frequency Provider Last Rate Last Admin   • SODIUM CHLORIDE 0.9 % IV SOLN        Dose not Required at 05/19/21 0445   • amLODIPine (NORVASC) tablet 10 mg  10 mg Oral Q EVENING Argentina Chase M.D.       • carvedilol (COREG) tablet 25 mg  25 mg Oral BID WITH MEALS Argentina Chase M.D.   25 mg at 05/19/21 1033   • hydroxychloroquine (Plaquenil) tablet 200 mg  200 mg Oral Q EVENING Argentina Chaes M.D.       • losartan (COZAAR) tablet 25 mg  25 mg Oral Q EVENING Argentina Chase M.D.       • predniSONE (DELTASONE) tablet 5 mg  5 mg Oral Q EVENING Argentina Chase M.D.       • sucralfate (CARAFATE) 1 GM/10ML suspension 1 g  1 g Oral BID Argentina Chase M.D.       • acetaminophen (Tylenol) tablet 650 mg  650 mg Oral Q6HRS PRN Argentina Chase M.D.       • ondansetron (ZOFRAN) syringe/vial injection 4 mg  4 mg Intravenous Q4HRS PRN Argentina Chase M.D.   4 mg at 05/19/21 1133   • ondansetron (ZOFRAN ODT) dispertab 4 mg  4 mg Oral Q4HRS PRN Argentina Chase M.D.       • promethazine (PHENERGAN) tablet 12.5-25 mg  12.5-25 mg Oral Q4HRS PRN Argentina Chase M.D.       • promethazine (PHENERGAN) suppository 12.5-25 mg  12.5-25 mg Rectal Q4HRS PRN Argentina  TERRI Chase       • prochlorperazine (COMPAZINE) injection 5-10 mg  5-10 mg Intravenous Q4HRS PRN Argentina Chase M.D.       • senna-docusate (PERICOLACE or SENOKOT S) 8.6-50 MG per tablet 2 tablet  2 tablet Oral BID Argentina Chase M.D.        And   • polyethylene glycol/lytes (MIRALAX) PACKET 1 Packet  1 Packet Oral QDAY PRN Argentina Chase M.D.        And   • magnesium hydroxide (MILK OF MAGNESIA) suspension 30 mL  30 mL Oral QDAY PRN Argentina Chase M.D.        And   • bisacodyl (DULCOLAX) suppository 10 mg  10 mg Rectal QDAY PRN Argentina Chase M.D.       • Respiratory Therapy Consult   Nebulization Continuous RT Argentina Chase M.D.       • mycophenolate (CELLCEPT) capsule 250 mg  250 mg Oral Q EVENING Marian Piña M.D.       • NS (BOLUS) infusion 250 mL  250 mL Intravenous DIALYSIS PRN Navin Metz M.D.       • lidocaine (XYLOCAINE) 1 % injection 1 mL  1 mL Intradermal PRN Navin Metz M.D.       • pantoprazole (PROTONIX) injection 40 mg  40 mg Intravenous BID Marian Piña M.D.       • epoetin (Retacrit) injection (Dialysis use only) 12,000 Units  12,000 Units Intravenous MO, WE + FR Marian Piña M.D.   12,000 Units at 05/19/21 1341   • HYDROmorphone (Dilaudid) injection 0.2 mg  0.2 mg Intravenous Q3HRS PRN Marian Piña M.D.   0.2 mg at 05/19/21 1331   • ceFEPIme (MAXIPIME) 1 g in  mL IVPB  1 g Intravenous Q24HRS Marian Piña M.D. 200 mL/hr at 05/19/21 1529 1 g at 05/19/21 1529   • budesonide-formoterol (SYMBICORT) 160-4.5 MCG/ACT inhaler 2 Puff  2 Puff Inhalation BID (RT) Marian Piña M.D.   2 Puff at 05/19/21 1430   • MD Alert...Vancomycin per Pharmacy   Other PHARMACY TO DOSE Marian Piña M.D.       • vancomycin (VANCOCIN) 1,500 mg in  mL IVPB  25 mg/kg Intravenous Once Marian Piña M.D.       • furosemide (LASIX) tablet 240 mg  240 mg Oral BID DIURETIC Navin Metz M.D.           Allergies  Allergies   Allergen Reactions   • Cephalexin Rash     .    • Clindamycin Rash     .   • Methylprednisolone Unspecified     Anxious   • Metoprolol Rash     .   • Norco [Hydrocodone-Acetaminophen] Nausea       Physical Exam  Temp:  [36.6 °C (97.9 °F)] 36.6 °C (97.9 °F)  Pulse:  [] 115  Resp:  [18-37] 28  BP: (116-203)/() 117/99  SpO2:  [79 %-100 %] 99 %    Physical Exam  Constitutional:       General: She is not in acute distress.     Appearance: She is well-developed.   HENT:      Mouth/Throat:      Pharynx: No oropharyngeal exudate.   Eyes:      General: No scleral icterus.  Neck:      Trachea: No tracheal deviation.   Cardiovascular:      Heart sounds: Normal heart sounds. No murmur heard.     Pulmonary:      Effort: Pulmonary effort is normal.      Breath sounds: No stridor. No rales.   Abdominal:      Palpations: Abdomen is soft.      Tenderness: There is no abdominal tenderness.   Musculoskeletal:         General: Normal range of motion.   Skin:     General: Skin is warm and dry.   Neurological:      Mental Status: She is alert and oriented to person, place, and time.     Access: Right brachiocephalic AV fistula, patent bruit and thrill      Fluids  Date 05/19/21 0700 - 05/20/21 0659   Shift 3374-8670 4424-7862 [id number] 24 Hour Total   INTAKE   P.O. 150   150   Dialysis 500   500   IV Piggyback 50   50   Shift Total 700   700   OUTPUT   Dialysis 4000   4000   Shift Total 4000   4000   Weight (kg) 55.2 55.2 55.2 55.2       Laboratory  Labs reviewed, pertinent labs below.  Recent Labs     05/19/21  0425 05/19/21  1125   WBC 6.5  --    RBC 3.52*  --    HEMOGLOBIN 10.1* 8.3*   HEMATOCRIT 34.1* 28.5*   MCV 96.9  --    MCH 28.7  --    MCHC 29.6*  --    RDW 69.4*  --    PLATELETCT 141*  --    MPV 10.2  --      Recent Labs     05/19/21  0425   SODIUM 131*   POTASSIUM 5.3   CHLORIDE 89*   CO2 30   GLUCOSE 102*   BUN 40*   CREATININE 5.22*   CALCIUM 9.1                URINALYSIS:  Lab Results   Component Value Date/Time    COLORURINE Yellow 02/15/2021 1108     CLARITY Clear 02/15/2021 1108    SPECGRAVITY 1.015 02/15/2021 1108    PHURINE 8.5 (A) 02/15/2021 1108    KETONES Negative 02/15/2021 1108    PROTEINURIN 30 (A) 02/15/2021 1108    BILIRUBINUR Negative 02/15/2021 1108    UROBILU 0.2 07/16/2020 0900    NITRITE Negative 02/15/2021 1108    LEUKESTERAS Negative 02/15/2021 1108    OCCULTBLOOD Trace (A) 02/15/2021 1108     [facility]  Lab Results   Component Value Date/Time    TOTPROTUR 45.0 (H) 07/16/2020 0900      Lab Results   Component Value Date/Time    CREATININEU 18.93 07/16/2020 0900       Imaging interpreted by radiologist. Imaging reports reviewed with pertinent findings below  DX-CHEST-PORTABLE (1 VIEW)   Final Result         1.  Pulmonary edema and/or infiltrates are identified, which appear somewhat increased since the prior exam.   2.  Cardiomegaly            Assessment/Plan  25 y.o. female with systemic lupus erythematosus, ESRD on hemodialysis Monday Wednesday Friday who presented 5/19/2021 with hemoptysis and hematemesis.    1.  ESRD on hemodialysis, oliguric, Monday Wednesday Friday.  Dialysis today per usual schedule.  Ultrafiltration as tolerated by blood pressure.  Avoid nephrotoxins.  Check labs daily.  Plan for extra dialysis tomorrow given ongoing hypertension and suspected occult volume overload.    2.  Access: Right brachiocephalic AV fistula, patent.  Right arm precautions.    3.  Anemia of chronic disease.  Continue outpatient Epogen 12,000 units thrice weekly with dialysis.  Check CBC daily.    4.  Accelerated hypertension, likely leading to acute pulmonary edema.  Recommend increase Lasix from 40 mg p.o. twice daily to 240 mg p.o. twice daily.    5.  Hemoptysis, suspect this is due to acute pulmonary edema.  Patient recently had a bronchoscopy with pulmonology in May 2021 that showed no obvious lesions.  Plan for dialysis as above.    6.  Hematemesis, with history of Karen-Keyes tear.  Will defer management to gastroenterology.    7.  Hyponatremia,  present on admission.  Limit hypotonic fluids, check labs daily.    Following  Discussed with Dr. Ayana Metz MD  Nephrology

## 2021-05-19 NOTE — ASSESSMENT & PLAN NOTE
-Patient has pulmonary edema and infiltrates identified on chest x-ray.  -Hemodialysis per nephrology.  -I appreciate nephrology consult and recommendations.  -Her EF was 55% on 5/13/2021.

## 2021-05-20 VITALS
BODY MASS INDEX: 20.14 KG/M2 | SYSTOLIC BLOOD PRESSURE: 109 MMHG | DIASTOLIC BLOOD PRESSURE: 61 MMHG | RESPIRATION RATE: 18 BRPM | TEMPERATURE: 98.9 F | WEIGHT: 128.31 LBS | HEIGHT: 67 IN | HEART RATE: 107 BPM | OXYGEN SATURATION: 100 %

## 2021-05-20 PROBLEM — J81.0 ACUTE PULMONARY EDEMA (HCC): Status: RESOLVED | Noted: 2020-12-02 | Resolved: 2021-05-20

## 2021-05-20 PROBLEM — K92.2 UPPER GI BLEED: Status: RESOLVED | Noted: 2020-05-29 | Resolved: 2021-05-20

## 2021-05-20 PROBLEM — E87.20 LACTIC ACIDOSIS: Status: RESOLVED | Noted: 2021-04-07 | Resolved: 2021-05-20

## 2021-05-20 PROBLEM — J96.20 ACUTE ON CHRONIC RESPIRATORY FAILURE (HCC): Status: RESOLVED | Noted: 2021-03-19 | Resolved: 2021-05-20

## 2021-05-20 PROBLEM — E87.1 HYPONATREMIA: Status: RESOLVED | Noted: 2020-01-26 | Resolved: 2021-05-20

## 2021-05-20 LAB
ANION GAP SERPL CALC-SCNC: 10 MMOL/L (ref 7–16)
BUN SERPL-MCNC: 28 MG/DL (ref 8–22)
CALCIUM SERPL-MCNC: 8.6 MG/DL (ref 8.4–10.2)
CHLORIDE SERPL-SCNC: 94 MMOL/L (ref 96–112)
CO2 SERPL-SCNC: 27 MMOL/L (ref 20–33)
CREAT SERPL-MCNC: 4.1 MG/DL (ref 0.5–1.4)
CRP SERPL HS-MCNC: 6.01 MG/DL (ref 0–0.75)
ERYTHROCYTE [DISTWIDTH] IN BLOOD BY AUTOMATED COUNT: 69.2 FL (ref 35.9–50)
ERYTHROCYTE [SEDIMENTATION RATE] IN BLOOD BY WESTERGREN METHOD: 60 MM/HOUR (ref 0–20)
GLUCOSE SERPL-MCNC: 103 MG/DL (ref 65–99)
HCT VFR BLD AUTO: 29.5 % (ref 37–47)
HCT VFR BLD AUTO: 31.4 % (ref 37–47)
HGB BLD-MCNC: 8.6 G/DL (ref 12–16)
HGB BLD-MCNC: 9.3 G/DL (ref 12–16)
MCH RBC QN AUTO: 28.4 PG (ref 27–33)
MCHC RBC AUTO-ENTMCNC: 29.6 G/DL (ref 33.6–35)
MCV RBC AUTO: 95.7 FL (ref 81.4–97.8)
MRSA DNA SPEC QL NAA+PROBE: NORMAL
PLATELET # BLD AUTO: 104 K/UL (ref 164–446)
PMV BLD AUTO: 9.9 FL (ref 9–12.9)
POTASSIUM SERPL-SCNC: 5 MMOL/L (ref 3.6–5.5)
RBC # BLD AUTO: 3.28 M/UL (ref 4.2–5.4)
SIGNIFICANT IND 70042: NORMAL
SITE SITE: NORMAL
SODIUM SERPL-SCNC: 131 MMOL/L (ref 135–145)
SOURCE SOURCE: NORMAL
VANCOMYCIN TROUGH SERPL-MCNC: 25.5 UG/ML (ref 10–20)
WBC # BLD AUTO: 6.7 K/UL (ref 4.8–10.8)

## 2021-05-20 PROCEDURE — 99233 SBSQ HOSP IP/OBS HIGH 50: CPT | Performed by: INTERNAL MEDICINE

## 2021-05-20 PROCEDURE — 85014 HEMATOCRIT: CPT

## 2021-05-20 PROCEDURE — 700102 HCHG RX REV CODE 250 W/ 637 OVERRIDE(OP): Performed by: HOSPITALIST

## 2021-05-20 PROCEDURE — 5A1D70Z PERFORMANCE OF URINARY FILTRATION, INTERMITTENT, LESS THAN 6 HOURS PER DAY: ICD-10-PCS | Performed by: INTERNAL MEDICINE

## 2021-05-20 PROCEDURE — 700105 HCHG RX REV CODE 258: Performed by: INTERNAL MEDICINE

## 2021-05-20 PROCEDURE — 85018 HEMOGLOBIN: CPT

## 2021-05-20 PROCEDURE — 700102 HCHG RX REV CODE 250 W/ 637 OVERRIDE(OP): Performed by: INTERNAL MEDICINE

## 2021-05-20 PROCEDURE — 80202 ASSAY OF VANCOMYCIN: CPT

## 2021-05-20 PROCEDURE — 90935 HEMODIALYSIS ONE EVALUATION: CPT

## 2021-05-20 PROCEDURE — C9113 INJ PANTOPRAZOLE SODIUM, VIA: HCPCS | Performed by: INTERNAL MEDICINE

## 2021-05-20 PROCEDURE — 85027 COMPLETE CBC AUTOMATED: CPT

## 2021-05-20 PROCEDURE — 80048 BASIC METABOLIC PNL TOTAL CA: CPT

## 2021-05-20 PROCEDURE — A9270 NON-COVERED ITEM OR SERVICE: HCPCS | Performed by: HOSPITALIST

## 2021-05-20 PROCEDURE — 700111 HCHG RX REV CODE 636 W/ 250 OVERRIDE (IP): Performed by: INTERNAL MEDICINE

## 2021-05-20 PROCEDURE — 99239 HOSP IP/OBS DSCHRG MGMT >30: CPT | Performed by: HOSPITALIST

## 2021-05-20 PROCEDURE — 94640 AIRWAY INHALATION TREATMENT: CPT

## 2021-05-20 PROCEDURE — A9270 NON-COVERED ITEM OR SERVICE: HCPCS | Performed by: INTERNAL MEDICINE

## 2021-05-20 PROCEDURE — 700111 HCHG RX REV CODE 636 W/ 250 OVERRIDE (IP): Performed by: HOSPITALIST

## 2021-05-20 PROCEDURE — 86140 C-REACTIVE PROTEIN: CPT

## 2021-05-20 PROCEDURE — 85652 RBC SED RATE AUTOMATED: CPT

## 2021-05-20 RX ORDER — BUDESONIDE AND FORMOTEROL FUMARATE DIHYDRATE 160; 4.5 UG/1; UG/1
2 AEROSOL RESPIRATORY (INHALATION) 2 TIMES DAILY
Qty: 2 EACH | Refills: 0 | Status: SHIPPED | OUTPATIENT
Start: 2021-05-20 | End: 2021-06-19

## 2021-05-20 RX ORDER — DIPHENHYDRAMINE HYDROCHLORIDE 50 MG/ML
25 INJECTION INTRAMUSCULAR; INTRAVENOUS ONCE
Status: COMPLETED | OUTPATIENT
Start: 2021-05-20 | End: 2021-05-20

## 2021-05-20 RX ORDER — OMEPRAZOLE 20 MG/1
20 CAPSULE, DELAYED RELEASE ORAL 2 TIMES DAILY
Status: DISCONTINUED | OUTPATIENT
Start: 2021-05-20 | End: 2021-05-20 | Stop reason: HOSPADM

## 2021-05-20 RX ORDER — FUROSEMIDE 80 MG
240 TABLET ORAL 2 TIMES DAILY
Qty: 30 TABLET | Refills: 0 | Status: SHIPPED | OUTPATIENT
Start: 2021-05-20 | End: 2021-11-12

## 2021-05-20 RX ORDER — DIPHENHYDRAMINE HCL 25 MG
25 TABLET ORAL EVERY 6 HOURS PRN
Status: DISCONTINUED | OUTPATIENT
Start: 2021-05-20 | End: 2021-05-20 | Stop reason: HOSPADM

## 2021-05-20 RX ADMIN — FUROSEMIDE 240 MG: 40 TABLET ORAL at 16:36

## 2021-05-20 RX ADMIN — DIPHENHYDRAMINE HCL 25 MG: 25 TABLET ORAL at 13:33

## 2021-05-20 RX ADMIN — CEFEPIME 1 G: 1 INJECTION, POWDER, FOR SOLUTION INTRAMUSCULAR; INTRAVENOUS at 05:35

## 2021-05-20 RX ADMIN — FUROSEMIDE 240 MG: 40 TABLET ORAL at 05:35

## 2021-05-20 RX ADMIN — BUDESONIDE AND FORMOTEROL FUMARATE DIHYDRATE 2 PUFF: 160; 4.5 AEROSOL RESPIRATORY (INHALATION) at 06:54

## 2021-05-20 RX ADMIN — DIPHENHYDRAMINE HYDROCHLORIDE 25 MG: 50 INJECTION, SOLUTION INTRAMUSCULAR; INTRAVENOUS at 06:00

## 2021-05-20 RX ADMIN — HYDROMORPHONE HYDROCHLORIDE 0.5 MG: 1 INJECTION, SOLUTION INTRAMUSCULAR; INTRAVENOUS; SUBCUTANEOUS at 12:22

## 2021-05-20 RX ADMIN — ONDANSETRON 4 MG: 2 INJECTION INTRAMUSCULAR; INTRAVENOUS at 10:10

## 2021-05-20 RX ADMIN — HYDROMORPHONE HYDROCHLORIDE 0.5 MG: 1 INJECTION, SOLUTION INTRAMUSCULAR; INTRAVENOUS; SUBCUTANEOUS at 00:42

## 2021-05-20 RX ADMIN — PANTOPRAZOLE SODIUM 40 MG: 40 INJECTION, POWDER, LYOPHILIZED, FOR SOLUTION INTRAVENOUS at 06:46

## 2021-05-20 ASSESSMENT — COGNITIVE AND FUNCTIONAL STATUS - GENERAL
STANDING UP FROM CHAIR USING ARMS: A LITTLE
SUGGESTED CMS G CODE MODIFIER DAILY ACTIVITY: CJ
MOVING FROM LYING ON BACK TO SITTING ON SIDE OF FLAT BED: A LITTLE
HELP NEEDED FOR BATHING: A LITTLE
TOILETING: A LITTLE
MOBILITY SCORE: 20
CLIMB 3 TO 5 STEPS WITH RAILING: A LITTLE
DAILY ACTIVITIY SCORE: 22
SUGGESTED CMS G CODE MODIFIER MOBILITY: CJ
WALKING IN HOSPITAL ROOM: A LITTLE

## 2021-05-20 ASSESSMENT — FIBROSIS 4 INDEX: FIB4 SCORE: 1.09

## 2021-05-20 ASSESSMENT — ENCOUNTER SYMPTOMS
ABDOMINAL PAIN: 0
FEVER: 0
SHORTNESS OF BREATH: 0

## 2021-05-20 ASSESSMENT — PAIN DESCRIPTION - PAIN TYPE
TYPE: ACUTE PAIN
TYPE: ACUTE PAIN;CHRONIC PAIN

## 2021-05-20 NOTE — CARE PLAN
The patient is Watcher - Medium risk of patient condition declining or worsening    Shift Goals  Clinical Goals: Pain Management  Patient Goals: Pain managed and rest    Progress made toward(s) clinical / shift goals:  Pt was able to have alleviation of pain after pharmacological interventions were administered.     Patient is not progressing towards the following goals:      Problem: Pain - Standard  Goal: Alleviation of pain or a reduction in pain to the patient’s comfort goal  Outcome: Not Progressing     Pt had severe pain overnight that was eventually controlled by medication. New orders were placed overnight in order to better manage pt's pain.

## 2021-05-20 NOTE — PROGRESS NOTES
Nephrology Daily Progress Note    Date of Service  5/20/2021    Chief Complaint  25 y.o. female with systemic lupus erythematosus, ESRD on hemodialysis Monday Wednesday Friday who presented 5/19/2021 with hemoptysis and hematemesis.    Interval Problem Update  5/20 - Patient had HD yesterday and again today with 1.1L UF, limited by hypotension. Seen after HD, denies SOB, chest pain. Feels like she could go home soon.     Review of Systems  Review of Systems   Constitutional: Negative for fever.   Respiratory: Negative for shortness of breath.    Cardiovascular: Negative for chest pain.   Gastrointestinal: Negative for abdominal pain.   All other systems reviewed and are negative.       Physical Exam  Temp:  [36.8 °C (98.3 °F)-38.2 °C (100.8 °F)] 37.1 °C (98.8 °F)  Pulse:  [105-130] 113  Resp:  [18-39] 18  BP: (113-144)/(67-99) 128/77  SpO2:  [97 %-100 %] 98 %    Physical Exam  Constitutional:       General: She is not in acute distress.     Appearance: She is well-developed.   HENT:      Mouth/Throat:      Pharynx: No oropharyngeal exudate.   Eyes:      General: No scleral icterus.  Neck:      Trachea: No tracheal deviation.   Cardiovascular:      Heart sounds: Normal heart sounds. No murmur heard.     Pulmonary:      Effort: Pulmonary effort is normal.      Breath sounds: No stridor. No rales.   Abdominal:      Palpations: Abdomen is soft.      Tenderness: There is no abdominal tenderness.   Musculoskeletal:         General: Normal range of motion.   Skin:     General: Skin is warm and dry.   Neurological:      General: No focal deficit present.      Mental Status: She is alert and oriented to person, place, and time.   Psychiatric:         Mood and Affect: Mood normal.         Behavior: Behavior normal.     Access: R BC AVF, patent bruit and thrill      Fluids    Intake/Output Summary (Last 24 hours) at 5/20/2021 1458  Last data filed at 5/20/2021 1140  Gross per 24 hour   Intake 700 ml   Output 1800 ml   Net  -1100 ml       Laboratory  Labs reviewed, pertinent labs below.  Recent Labs     05/19/21  0425 05/19/21  1125 05/19/21  1807 05/20/21  0029 05/20/21  0622   WBC 6.5  --   --  6.7  --    RBC 3.52*  --   --  3.28*  --    HEMOGLOBIN 10.1*   < > 10.2* 9.3* 8.6*   HEMATOCRIT 34.1*   < > 35.1* 31.4* 29.5*   MCV 96.9  --   --  95.7  --    MCH 28.7  --   --  28.4  --    MCHC 29.6*  --   --  29.6*  --    RDW 69.4*  --   --  69.2*  --    PLATELETCT 141*  --   --  104*  --    MPV 10.2  --   --  9.9  --     < > = values in this interval not displayed.     Recent Labs     05/19/21 0425 05/20/21  0029   SODIUM 131* 131*   POTASSIUM 5.3 5.0   CHLORIDE 89* 94*   CO2 30 27   GLUCOSE 102* 103*   BUN 40* 28*   CREATININE 5.22* 4.10*   CALCIUM 9.1 8.6               URINALYSIS:  Lab Results   Component Value Date/Time    COLORURINE Yellow 02/15/2021 1108    CLARITY Clear 02/15/2021 1108    SPECGRAVITY 1.015 02/15/2021 1108    PHURINE 8.5 (A) 02/15/2021 1108    KETONES Negative 02/15/2021 1108    PROTEINURIN 30 (A) 02/15/2021 1108    BILIRUBINUR Negative 02/15/2021 1108    UROBILU 0.2 07/16/2020 0900    NITRITE Negative 02/15/2021 1108    LEUKESTERAS Negative 02/15/2021 1108    OCCULTBLOOD Trace (A) 02/15/2021 1108     [facility]  Lab Results   Component Value Date/Time    TOTPROTUR 45.0 (H) 07/16/2020 0900      Lab Results   Component Value Date/Time    CREATININEU 18.93 07/16/2020 0900         Imaging interpreted by radiologist. Imaging reports reviewed with pertinent findings below  DX-CHEST-PORTABLE (1 VIEW)   Final Result         1.  Pulmonary edema and/or infiltrates are identified, which appear somewhat increased since the prior exam.   2.  Cardiomegaly            Current Facility-Administered Medications   Medication Dose Route Frequency Provider Last Rate Last Admin   • diphenhydrAMINE (BENADRYL) tablet/capsule 25 mg  25 mg Oral Q6HRS PRN Jorge Melendez M.D.   25 mg at 05/20/21 1333   • amLODIPine (NORVASC) tablet 10 mg  10 mg Oral  Q EVENING Argentina Chase M.D.   10 mg at 05/19/21 1809   • carvedilol (COREG) tablet 25 mg  25 mg Oral BID WITH MEALS Argentina Chase M.D.   25 mg at 05/19/21 1810   • hydroxychloroquine (Plaquenil) tablet 200 mg  200 mg Oral Q EVENING CHAUNCEY RustDJg   200 mg at 05/19/21 1810   • losartan (COZAAR) tablet 25 mg  25 mg Oral Q EVENING CHAUNCEY RustDJg   25 mg at 05/19/21 1809   • predniSONE (DELTASONE) tablet 5 mg  5 mg Oral Q EVENING CHAUNCEY RustDJg   5 mg at 05/19/21 1809   • sucralfate (CARAFATE) 1 GM/10ML suspension 1 g  1 g Oral BID Argentina Chase M.D.       • acetaminophen (Tylenol) tablet 650 mg  650 mg Oral Q6HRS PRN Argentina Chase M.D.   650 mg at 05/19/21 2340   • ondansetron (ZOFRAN) syringe/vial injection 4 mg  4 mg Intravenous Q4HRS PRN Argentina Chase M.D.   4 mg at 05/20/21 1010   • ondansetron (ZOFRAN ODT) dispertab 4 mg  4 mg Oral Q4HRS PRN Argentina Chase M.D.   4 mg at 05/19/21 2116   • promethazine (PHENERGAN) tablet 12.5-25 mg  12.5-25 mg Oral Q4HRS PRN Argentina Chase M.D.       • promethazine (PHENERGAN) suppository 12.5-25 mg  12.5-25 mg Rectal Q4HRS PRN Argentina Chase M.D.       • prochlorperazine (COMPAZINE) injection 5-10 mg  5-10 mg Intravenous Q4HRS PRN Argentina Chase M.D.       • senna-docusate (PERICOLACE or SENOKOT S) 8.6-50 MG per tablet 2 tablet  2 tablet Oral BID Argentina Chase M.D.   2 tablet at 05/19/21 1810    And   • polyethylene glycol/lytes (MIRALAX) PACKET 1 Packet  1 Packet Oral QDAY PRN Argentina Chase M.D.        And   • magnesium hydroxide (MILK OF MAGNESIA) suspension 30 mL  30 mL Oral QDAY PRN Argentina Chase M.D.        And   • bisacodyl (DULCOLAX) suppository 10 mg  10 mg Rectal QDAY PRN Argentina Chase M.D.       • Respiratory Therapy Consult   Nebulization Continuous RT Argentina Chase M.D.       • mycophenolate  (CELLCEPT) capsule 250 mg  250 mg Oral Q EVENING Marian Piña M.D.   250 mg at 05/19/21 1809   • NS (BOLUS) infusion 250 mL  250 mL Intravenous DIALYSIS PRN Navin Metz M.D.       • lidocaine (XYLOCAINE) 1 % injection 1 mL  1 mL Intradermal PRN Navin Metz M.D.       • pantoprazole (PROTONIX) injection 40 mg  40 mg Intravenous BID Marian Piña M.D.   40 mg at 05/20/21 0646   • epoetin (Retacrit) injection (Dialysis use only) 12,000 Units  12,000 Units Intravenous MO, WE + FR Marian Piña M.D.   12,000 Units at 05/19/21 1341   • ceFEPIme (MAXIPIME) 1 g in  mL IVPB  1 g Intravenous Q24HRS Marian Piña M.D.   Stopped at 05/20/21 0540   • budesonide-formoterol (SYMBICORT) 160-4.5 MCG/ACT inhaler 2 Puff  2 Puff Inhalation BID (RT) Marian Piña M.D.   2 Puff at 05/20/21 0654   • MD Alert...Vancomycin per Pharmacy   Other PHARMACY TO DOSE Marian Piña M.D.       • furosemide (LASIX) tablet 240 mg  240 mg Oral BID DIURETIC Navin Metz M.D.   240 mg at 05/20/21 0535   • HYDROmorphone (Dilaudid) injection 0.5 mg  0.5 mg Intravenous Q3HRS PRN Argentina Chase M.D.   0.5 mg at 05/20/21 1222   • HYDROmorphone (Dilaudid) injection 0.2 mg  0.2 mg Intravenous Q3HRS PRN Argentina Chase M.D.             Assessment/Plan  25 y.o. female with systemic lupus erythematosus, ESRD on hemodialysis Monday Wednesday Friday who presented 5/19/2021 with hemoptysis and hematemesis.     1.  ESRD on hemodialysis, oliguric, Monday Wednesday Friday.  Extra HD today to challenge target weight, only able to do 1.1L so patient reached her dry weight. Would recommend HD again either tomorrow or Saturday if able to arrange outpatient. Avoid nephrotoxins. Check labs daily.     2.  Access: Right brachiocephalic AV fistula, patent. Stable. Continue Right arm precautions.     3.  Anemia of chronic disease.  Persistent. Continue outpatient Epogen 12,000 units thrice weekly with dialysis.  Check CBC daily.      4.  Accelerated hypertension, much  improved with HD / UF. Continue Lasix 240 mg PO BID to reduce interdialytic weight gain.      5.  Hemoptysis, improved with UF, likely from pulmonary edema. Continnue lasix as above.     6.  Hematemesis, with history of Karen-Keyes tear. Defer management to GI.     7.  Hyponatremia, persistent. Limit hypotonic fluids.  Check labs daily.       Following  If patient can do outpatient HD on Saturday, OK to discharge from Nephrology standpoint.  If cannot do outpatient HD Saturday, will plan on inpatient HD tomorrow. There is no need for outpatient nephrology clinic follow up appointment, as the patient will be seen by a nephrologist at their outpatient dialysis center.       Navin Metz MD  Nephrology

## 2021-05-20 NOTE — PROGRESS NOTES
Report received from MICHELLE Izquierdo. Plan of care discussed at patient's bedside. Whiteboard has been updated. Pt is resting comfortably in bed and declines any further needs at this time. Safety precautions in place and call light within reach.

## 2021-05-20 NOTE — DISCHARGE PLANNING
Outpatient Dialysis Note    This DC was contacted by Dr. Metz for possible re-schedule of dialysis Outpatient for Saturday. They have an available 11:00am chair and I spoke with patient and she agreed to receive Outpatient HD on Saturday at 11:00am.    Mary Han- Dialysis Coordinator # [phone]  [facility]

## 2021-05-20 NOTE — PROGRESS NOTES
Telemetry Shift Summary      Rhythm: ST  HR Range: 104-130  Ectopy: N/A  Measurements: .14/.08/.32    Normal Values:  Rhythm: SR  HR Range:   Measurements: 0.12-0.20/ 0.06-0.10/ 0.30-0.52;E

## 2021-05-20 NOTE — DISCHARGE SUMMARY
"Discharge Summary    CHIEF COMPLAINT ON ADMISSION  Chief Complaint   Patient presents with   • Shortness of Breath       Reason for Admission  EMS     Admission Date  5/19/2021    CODE STATUS  Full Code    HPI & HOSPITAL COURSE  For full details of admission please see the H&P of Dr. Argentina Chase dated 5/19/2021, briefly, \"25 y.o. female, well-known to this service, with history of lupus on immunotherapy, and end-stage renal disease on hemodialysis (M, W, F, Dr. Trent), chronic respiratory failure on 5 L of oxygen at baseline, who unfortunately has been hospitalized 9 times in 2021, mostly with respiratory issues and or issues related to GI bleed requiring multiple endoscopies, return to the emergency department today on 5/19/2021 with reported increase of shortness of breath, O2 demand, going from 5 to 8 L at home and having 3 episodes of bright red and maroon vomiting prior to coming to the ED.  Feeling anxious but denies any pain.     ER COURSE:  -Afebrile, tachycardic and tachypneic with significantly elevated blood pressure 203/135.  -Patient was hypoxic on arrival, currently requiring 45 L of heated high flow to maintain O2 saturations above 90%.    -Initial hemoglobin 10.1.  -Creatinine 5.22 with potassium of 5.3, BUN 40.  -Troponin V 195.  Lactic acid 2.1.  -Viral Covid test negative.  -Chest x-ray showing acute pulmonary edema.\"    Patient was admitted for treatment of the above issues.  She underwent urgent hemodialysis under the guidance of nephrology.  She had significant improvement in her symptomatology.  She actually underwent a second hemodialysis session on hospital day 1, 5/20/2021.  Ongoing improvements were noted, she was stable on her home 5 L of oxygen and cleared for discharge by nephrology.  Immunosuppressive therapy was continued for the duration of her hospital stay.  Hematemesis spontaneously resolved.  No indications for endoscopy seen by gastroenterology, they know her well.  " Patient will be discharged on high-dose Lasix therapy at the recommendations of nephrology.  She is provided with return precautions.    Therefore, she is discharged in good and stable condition to home with close outpatient follow-up.    The patient recovered much more quickly than anticipated on admission.    Discharge Date  05/20/21      FOLLOW UP ITEMS POST DISCHARGE  None    DISCHARGE DIAGNOSES  Principal Problem (Resolved):    Acute on chronic respiratory failure (HCC) POA: Unknown  Active Problems:    ESRD (end stage renal disease) on dialysis (HCC) POA: Yes    Situational anxiety POA: Yes    Immunosuppression (HCC) POA: Yes    Thrombocytopenia (HCC) POA: Yes  Resolved Problems:    Upper GI bleed POA: Yes    Hyponatremia POA: Yes    Acute pulmonary edema (HCC) POA: Unknown    Lactic acidosis POA: Yes      FOLLOW UP  Future Appointments   Date Time Provider Department Center   5/27/2021  8:00 AM [facility] IN[facility] [address]   5/29/2021  8:30 AM [facility] [address]   6/10/2021  8:30 AM INFUSION QUICK INJECT ONP [address]   6/12/2021 10:00 AM [facility] [address]   6/24/2021  9:00 AM INFUSION QUICK INJECT ON[facility] [address]   6/26/2021  8:30 AM [facility] [address]     No follow-up provider specified.    MEDICATIONS ON DISCHARGE     Medication List      START taking these medications      Instructions   furosemide 80 MG Tabs  Commonly known as: LASIX   Take 3 Tablets by mouth 2 times a day.  Dose: 240 mg        CONTINUE taking these medications      Instructions   amLODIPine 10 MG Tabs  Commonly known as: NORVASC   Take 1 tablet by mouth every evening.  Dose: 10 mg     carvedilol 25 MG Tabs  Commonly known as: COREG   Take 1 tablet by mouth 2 times a day with meals.  Dose: 25 mg     cloNIDine 0.2 MG/24HR Ptwk patch  Commonly known as: CATAPRES   Place 1 Patch on the skin every 7 days.  Dose: 1 Patch     dapsone 100 MG Tabs   Take 1 tablet by mouth every day for  30 days.  Dose: 100 mg     hydroxychloroquine 200 MG Tabs  Commonly known as: Plaquenil   Take 200 mg by mouth every evening.  Dose: 200 mg     losartan 25 MG Tabs  Commonly known as: COZAAR   Take 1 tablet by mouth every day.  Dose: 25 mg     mycophenolate 180 MG EC tablet  Commonly known as: Myfortic   Take 1 Tab by mouth every evening.  Dose: 180 mg     ondansetron 4 MG Tbdp  Commonly known as: ZOFRAN ODT   Take 1 tablet by mouth every four hours as needed for Nausea (give PO if no IV route available).  Dose: 4 mg     pantoprazole 40 MG Tbec  Commonly known as: PROTONIX   Take 40 mg by mouth 2 times a day.  Dose: 40 mg     predniSONE 5 MG Tabs  Commonly known as: DELTASONE   Take 5 mg by mouth every day.  Dose: 5 mg     sucralfate 1 GM/10ML Susp  Commonly known as: CARAFATE   Doctor's comments: Can give tablets at same dosage if pt unable to afford liquid preparation, quantity sufficient for one month  Take 10 mL by mouth 4 Times a Day,Before Meals and at Bedtime.  Dose: 1 g            Allergies  Allergies   Allergen Reactions   • Cephalexin Rash     .   • Clindamycin Rash     .   • Methylprednisolone Unspecified     Anxious   • Metoprolol Rash     .   • Maxipime [Cefepime] Itching   • Norco [Hydrocodone-Acetaminophen] Nausea       DIET  Orders Placed This Encounter   Procedures   • Diet Order Diet: Cardiac     Standing Status:   Standing     Number of Occurrences:   1     Order Specific Question:   Diet:     Answer:   Cardiac [6]       ACTIVITY  As tolerated.  Weight bearing as tolerated    CONSULTATIONS  Dr. Piña, pulmonary medicine  Dr. Metz, nephrology  Dr. Mcqueen, gastroenterology    RADIOLOGY  DX-CHEST-PORTABLE (1 VIEW)   Final Result         1.  Pulmonary edema and/or infiltrates are identified, which appear somewhat increased since the prior exam.   2.  Cardiomegaly            LABORATORY  Lab Results   Component Value Date    SODIUM 131 (L) 05/20/2021    POTASSIUM 5.0 05/20/2021    CHLORIDE 94 (L)  05/20/2021    CO2 27 05/20/2021    GLUCOSE 103 (H) 05/20/2021    BUN 28 (H) 05/20/2021    CREATININE 4.10 (H) 05/20/2021        Lab Results   Component Value Date    WBC 6.7 05/20/2021    HEMOGLOBIN 8.6 (L) 05/20/2021    HEMATOCRIT 29.5 (L) 05/20/2021    PLATELETCT 104 (L) 05/20/2021        Total time of the discharge process exceeds 32 minutes.

## 2021-05-20 NOTE — PROGRESS NOTES
2340: This RN notified by YA Jalloh that pt's temperature was elevated. When going in to assess pt, the pt described that she was also in 9/10 pain in her back and CARLENE where her fistula was located. Pt was also stating she was anxious.  MD Madison notified of pt's pain level and anxiety. New orders placed, see MAR. Heated blanket removed and turned off due to pt's elevated temperature.    0040: Pt reassessed with temperature remaining elevated at 100.7 F. Pt stated that she was still experiencing 8/10 pain in lower back and CARLENE. See MAR.     0207: Pt is resting comfortably and states that she is in less pain. Pt's temperature has also decreased to 99.3 F.

## 2021-05-20 NOTE — DISCHARGE INSTRUCTIONS
Discharge Instructions    Discharged to home by car with relative. Discharged via wheelchair, hospital escort: Yes.  Special equipment needed: Not Applicable    Be sure to schedule a follow-up appointment with your primary care doctor or any specialists as instructed.     Discharge Plan:        I understand that a diet low in cholesterol, fat, and sodium is recommended for good health. Unless I have been given specific instructions below for another diet, I accept this instruction as my diet prescription.   Other diet: Renal     Special Instructions:   Pulmonary Edema  Pulmonary edema is unusual buildup of fluid in the lungs. It makes it hard for a person to breathe. This condition is an emergency.  Follow these instructions at home:  Medicines  · Take over-the-counter and prescription medicines only as told by your doctor.  · If you were prescribed an antibiotic medicine, take it as told by your doctor. Do not stop taking the antibiotic even if you start to feel better.  · Ask your doctor to help you write a plan with information about each medicine you take. This may include:  ? Why you are taking it.  ? Possible side effects.  ? Best time of day to take it.  ? Foods to take with it, or foods to avoid.  ? When to stop taking it.  · Make a list of each medicine, vitamin, or herbal supplement you take.  ? Keep the list with you at all times.  ? Show the list to your doctor at each visit and before starting a new medicine.  ? Keep the list up to date.  Lifestyle    · Do regular exercise as told by your doctor. It is important to do it safely. You can do this by:  ? Asking your doctor what exercises and activities are good and safe for you to do.  ? Pacing your activities. This will prevent shortness of breath or chest pain.  ? Resting for at least 1 hour before and after meals.  ? Asking about cardiac rehabilitation programs. These may include:  ? Education.  ? Exercise plans.  ? Counseling.  · Eat a heart-healthy  diet that is low in salt, saturated fat, and cholesterol. Your doctor may suggest foods that are high in fiber, such as:  ? Fresh fruits and vegetables.  ? Whole grains.  ? Beans.  · Do not use any products that contain nicotine or tobacco, such as cigarettes and e-cigarettes. If you need help quitting, ask your doctor.  General instructions  · Keep a record of your weight.  ? Record your hospital or clinic weight. When you get home, compare it to your scale and record your weight.  ? Weigh yourself first thing in the morning each day, and record the weights. You should weigh yourself every morning after you pee and before you eat breakfast. Wear the same amount of clothing each time you weigh yourself.  ? Share your weight record with your doctor. These can help your doctor see if your body is holding extra fluid.  ? Tell your doctor right away if you have gained weight quickly, or if you have gained weight as told by your doctor. Your medicines may need to be adjusted.  · Check your blood pressure as often as told by your doctor.  ? [facility] a [facility] pressure cuff [facility].  ? Record your blood pressure readings. Bring them with you for your clinic visits.  · Stay at a healthy weight. Ask your doctor what weight is healthy for you.  · Think about doing therapy or being a part of a support group.  · Keep all follow-up visits as told by your doctor. This is important.  Contact a doctor if:  · You have questions about your medicines.  · You miss a dose of your medicine.  Get help right away if:  · You have very bad chest pain, especially if the pain is crushing or pressure-like and spreads to the arms, back, neck, or jaw.  · You have more swelling in your hands, feet, ankles, or belly (abdomen).  · You feel sick to your stomach (nauseous).  · You have strange sweating.  · Your skin turns blue or pale.  · Your shortness of breath gets worse.  · You feel dizzy or unsteady.  · Your vision is blurry.  · You have  a headache.  · You cough up bloody split.  · You cannot sleep because it is hard to breathe.  · You start to feel a “jumping” or “fluttering” sensation (palpitations) in the chest that is unusual for you.  · You feel like you cannot get enough air.  · You gain weight rapidly.  These symptoms may be an emergency. Do not wait to see if the symptoms will go away. Get medical help right away. Call your local emergency services (911 in the [place] Do not drive yourself to the hospital.  Summary  · Pulmonary edema is unusual fluid buildup in the lungs that can make it hard to breathe.  · This condition is an emergency.  · Keep a record of your weight. Call your doctor if you gain weight rapidly.  This information is not intended to replace advice given to you by your health care provider. Make sure you discuss any questions you have with your health care provider.  Document Released: 12/06/2010 Document Revised: 11/30/2018 Document Reviewed: 03/20/2018  [employer] Patient Education © 2020 [employer]      · Is patient discharged on Warfarin / Coumadin?   No     Depression / Suicide Risk    As you are discharged from this [facility] facility, it is important to learn how to keep safe from harming yourself.    Recognize the warning signs:  · Abrupt changes in personality, positive or negative- including increase in energy   · Giving away possessions  · Change in eating patterns- significant weight changes-  positive or negative  · Change in sleeping patterns- unable to sleep or sleeping all the time   · Unwillingness or inability to communicate  · Depression  · Unusual sadness, discouragement and loneliness  · Talk of wanting to die  · Neglect of personal appearance   · Rebelliousness- reckless behavior  · Withdrawal from people/activities they love  · Confusion- inability to concentrate     If you or a loved one observes any of these behaviors or has concerns about self-harm, here's what you can do:  · Talk about it-  your feelings and reasons for harming yourself  · Remove any means that you might use to hurt yourself (examples: pills, rope, extension cords, firearm)  · Get professional help from the community (Mental Health, Substance Abuse, psychological counseling)  · Do not be alone:Call your Safe Contact- someone whom you trust who will be there for you.  · Call your local CRISIS HOTLINE 978-6870 or 865-556-3255  · Call your local Children's Mobile Crisis Response Team [place] (691) 991-8765 or www.[employer]  · Call the toll free National Suicide Prevention Hotlines   · National Suicide Prevention Lifeline 497-606-NFWM (8252)  · National Hope Line Network 800-SUICIDE (041-3659)

## 2021-05-20 NOTE — PROGRESS NOTES
[facility] Medicine Daily Progress Note    Date of Service  5/20/2021    Chief Complaint  25 y.o. female admitted 5/19/2021 with dyspnea.    Hospital Course  No notes on file    Interval Problem Update  No acute overnight events, patient seen on dialysis.  Receiving an extra session today, on top of her usual Monday Wednesday Friday.  Assistance of nephrology greatly appreciated.  Patient reports that her breathing has improved significantly, does not quite seem to be 100% back to baseline.  No further hematemesis.    Consultants/Specialty  Nephrology  Critical care medicine    Code Status  Full Code    Disposition  Pending at this juncture, likely stable for return to home following her scheduled dialysis session tomorrow if cleared by nephrology.  Remains on oxygen.    Review of Systems  All systems reviewed and negative except as noted per above.    Physical Exam  Temp:  [36.8 °C (98.3 °F)-38.2 °C (100.8 °F)] 37.1 °C (98.8 °F)  Pulse:  [] 113  Resp:  [18-39] 18  BP: (113-144)/(67-99) 128/77  SpO2:  [92 %-100 %] 98 %    General: No acute distress, seen on HD.   HEENT atraumatic, normocephalic, pupils equal round reactive to light  Neck: No JVD  Chest: Respirations are unlabored  Cardiac: Physiologic S1 and S2  Abdomen: Soft, nontender, nondistended  Extremities: Without clubbing, cyanosis or edema  Neuro: Cranial nerves II through XII are grossly intact.  Psych: No anxiety, judgement intact.        Current Facility-Administered Medications:   •  diphenhydrAMINE (BENADRYL) tablet/capsule 25 mg, 25 mg, Oral, Q6HRS PRN, Jorge Melendez M.D., 25 mg at 05/20/21 1333  •  amLODIPine (NORVASC) tablet 10 mg, 10 mg, Oral, Q EVENING, Argentina Chase M.D., 10 mg at 05/19/21 1809  •  carvedilol (COREG) tablet 25 mg, 25 mg, Oral, BID WITH MEALS, Argentina Chase M.D., 25 mg at 05/19/21 1810  •  hydroxychloroquine (Plaquenil) tablet 200 mg, 200 mg, Oral, Q EVENING, Argentina Chase M.D., 200 mg at  05/19/21 1810  •  losartan (COZAAR) tablet 25 mg, 25 mg, Oral, Q EVENING, Argentina Chase M.D., 25 mg at 05/19/21 1809  •  predniSONE (DELTASONE) tablet 5 mg, 5 mg, Oral, Q EVENING, Argentina Chase M.D., 5 mg at 05/19/21 1809  •  sucralfate (CARAFATE) 1 GM/10ML suspension 1 g, 1 g, Oral, BID, Argentina Chase M.D.  •  acetaminophen (Tylenol) tablet 650 mg, 650 mg, Oral, Q6HRS PRN, Argentina Chase M.D., 650 mg at 05/19/21 2340  •  ondansetron (ZOFRAN) syringe/vial injection 4 mg, 4 mg, Intravenous, Q4HRS PRN, Argentina Chase M.D., 4 mg at 05/20/21 1010  •  ondansetron (ZOFRAN ODT) dispertab 4 mg, 4 mg, Oral, Q4HRS PRN, Argentina Chase M.D., 4 mg at 05/19/21 2116  •  promethazine (PHENERGAN) tablet 12.5-25 mg, 12.5-25 mg, Oral, Q4HRS PRN, Argentina Chase M.D.  •  promethazine (PHENERGAN) suppository 12.5-25 mg, 12.5-25 mg, Rectal, Q4HRS PRN, Argentina Chase M.D.  •  prochlorperazine (COMPAZINE) injection 5-10 mg, 5-10 mg, Intravenous, Q4HRS PRN, Argentina Chase M.D.  •  senna-docusate (PERICOLACE or SENOKOT S) 8.6-50 MG per tablet 2 tablet, 2 tablet, Oral, BID, 2 tablet at 05/19/21 1810 **AND** polyethylene glycol/lytes (MIRALAX) PACKET 1 Packet, 1 Packet, Oral, QDAY PRN **AND** magnesium hydroxide (MILK OF MAGNESIA) suspension 30 mL, 30 mL, Oral, QDAY PRN **AND** bisacodyl (DULCOLAX) suppository 10 mg, 10 mg, Rectal, QDAY PRN, Argentina Chase M.D.  •  Respiratory Therapy Consult, , Nebulization, Continuous RT, Argentina Chase M.D.  •  mycophenolate (CELLCEPT) capsule 250 mg, 250 mg, Oral, Q EVENING, Marian Piña M.D., 250 mg at 05/19/21 1809  •  NS (BOLUS) infusion 250 mL, 250 mL, Intravenous, DIALYSIS PRN, Navin Metz M.D.  •  lidocaine (XYLOCAINE) 1 % injection 1 mL, 1 mL, Intradermal, PRN, Navin Metz M.D.  •  pantoprazole (PROTONIX) injection 40 mg, 40 mg, Intravenous, BID, Marian Piña M.D., 40 mg at 05/20/21 0646  •  epoetin  (Retacrit) injection (Dialysis use only) 12,000 Units, 12,000 Units, Intravenous, MO, WE + FR, Marian Piña M.D., 12,000 Units at 05/19/21 1341  •  ceFEPIme (MAXIPIME) 1 g in  mL IVPB, 1 g, Intravenous, Q24HRS, Marian Piña M.D., Stopped at 05/20/21 0540  •  budesonide-formoterol (SYMBICORT) 160-4.5 MCG/ACT inhaler 2 Puff, 2 Puff, Inhalation, BID (RT), Marian Piña M.D., 2 Puff at 05/20/21 0654  •  MD Alert...Vancomycin per Pharmacy, , Other, PHARMACY TO DOSE, Marian Piña M.D.  •  furosemide (LASIX) tablet 240 mg, 240 mg, Oral, BID DIURETIC, Navin Metz M.D., 240 mg at 05/20/21 0535  •  HYDROmorphone (Dilaudid) injection 0.5 mg, 0.5 mg, Intravenous, Q3HRS PRN, Argentina Chase M.D., 0.5 mg at 05/20/21 1222  •  HYDROmorphone (Dilaudid) injection 0.2 mg, 0.2 mg, Intravenous, Q3HRS PRN, Argentina Chase M.D.        Fluids    Intake/Output Summary (Last 24 hours) at 5/20/2021 1424  Last data filed at 5/20/2021 1140  Gross per 24 hour   Intake 1200 ml   Output 5800 ml   Net -4600 ml       Laboratory  Recent Labs     05/19/21  0425 05/19/21  1125 05/19/21  1807 05/20/21  0029 05/20/21  0622   WBC 6.5  --   --  6.7  --    RBC 3.52*  --   --  3.28*  --    HEMOGLOBIN 10.1*   < > 10.2* 9.3* 8.6*   HEMATOCRIT 34.1*   < > 35.1* 31.4* 29.5*   MCV 96.9  --   --  95.7  --    MCH 28.7  --   --  28.4  --    MCHC 29.6*  --   --  29.6*  --    RDW 69.4*  --   --  69.2*  --    PLATELETCT 141*  --   --  104*  --    MPV 10.2  --   --  9.9  --     < > = values in this interval not displayed.     Recent Labs     05/19/21  0425 05/20/21  0029   SODIUM 131* 131*   POTASSIUM 5.3 5.0   CHLORIDE 89* 94*   CO2 30 27   GLUCOSE 102* 103*   BUN 40* 28*   CREATININE 5.22* 4.10*   CALCIUM 9.1 8.6                   Imaging  DX-CHEST-PORTABLE (1 VIEW)   Final Result         1.  Pulmonary edema and/or infiltrates are identified, which appear somewhat increased since the prior exam.   2.  Cardiomegaly           Assessment/Plan  * Acute on  chronic respiratory failure (HCC)  Assessment & Plan  -Inpatient status to the ICU.  -At baseline, patient requires 5 L of supplemental oxygen to maintain O2 saturations above 90%, currently at 45 L heated high flow.  -No acute interventions per GI, they recommend stringent nausea control to prevent any retching.  -We will con't her on Protonix and order serial H&H.      Upper GI bleed- (present on admission)  Assessment & Plan  -Reported 3 episodes of bloody emesis on day of admit.    -Serial H&H and Protonix IV.  D/W Dr. Valentino 5/19.  He anticipates no intervention.     Acute pulmonary edema (HCC)  Assessment & Plan  -Patient has pulmonary edema and infiltrates identified on chest x-ray.  -Hemodialysis per nephrology.  -I appreciate nephrology consult and recommendations.  -Her EF was 55% on 5/13/2021.    Hyponatremia- (present on admission)  Assessment & Plan  -Sodium is 131 on admission.  Under the context of fluid overload.  Monitor morning labs.    ESRD (end stage renal disease) on dialysis ([facility])- (present on admission)  Assessment & Plan  -Monday, Wednesday and Friday, Dr. Trent.  Extra session today per nephrology.    Immunosuppression ([facility])- (present on admission)  Assessment & Plan  Continue Plaquenil, mycophenolate and prednisone.    Situational anxiety- (present on admission)  Assessment & Plan  -Just added Ativan for now.    Lactic acidosis- (present on admission)  Assessment & Plan  -On admission, lactic acid 2.1.  Monitor labs and avoid giving additional IV fluids    Thrombocytopenia (HCC)- (present on admission)  Assessment & Plan  -On admission, platelets are 141,000.  Monitor morning labs.        VTE prophylaxis: SCDs

## 2021-05-20 NOTE — PROGRESS NOTES
[facility] Progress Note     Hemodialysis treatment ordered today per Dr. Metz x 3 hours. Treatment initiated at 0840 and ended at 1140.Extra treatment today     Pt with episode of low BP resulting to decreasing UF goal; see paper flow sheets for details.       Net UF 1100mL     Access + Bruit,+ Thrill. Applied pressure to stop bleeding on both venous and arterial side of the graft x XXX mins.  Pressure dressing applied to site (-)bleeding.     Please monitor for any signs of bleeding from graft.      Report given to Primary RN

## 2021-05-21 NOTE — PROGRESS NOTES
"Pt stated that she was \"feeling itchy\" after administration of Cefepime. Pt had received less than 10 mL of medication. Infusion was stopped and MD Madison notified of reaction. New orders placed for IV Benadryl. PIV flushed with 10 mL NS flush and IV Benadryl administered. Pt stated that she felt no reaction after administration of IV Benadryl.   "

## 2021-05-21 NOTE — PROGRESS NOTES
Discharging Patient home per physician order.  Discharged with home to 1730 at home.  Demonstrated understanding of discharge instructions, follow up appointments, home medications, prescriptions sent to [facility], and nursing care instructions for pulmonary edema.  Ambulating without assistance, voiding without difficulty, pain well controlled, tolerating oral medications, oxygen saturation greater than 90% , tolerating diet.   Educational handouts given and discussed.  Verbalized understanding of discharge instructions and educational handouts.  All questions answered.  Belongings with patient at time of discharge. Pt was sent home with mask in place and her home oxygen.

## 2021-05-26 RX ORDER — HEPARIN SODIUM (PORCINE) LOCK FLUSH IV SOLN 100 UNIT/ML 100 UNIT/ML
500 SOLUTION INTRAVENOUS PRN
Status: CANCELLED | OUTPATIENT
Start: 2021-05-26

## 2021-05-26 RX ORDER — DIPHENHYDRAMINE HCL 25 MG
25 TABLET ORAL ONCE
Status: CANCELLED | OUTPATIENT
Start: 2021-05-26 | End: 2021-05-26

## 2021-05-26 RX ORDER — 0.9 % SODIUM CHLORIDE 0.9 %
VIAL (ML) INJECTION PRN
Status: CANCELLED | OUTPATIENT
Start: 2021-05-26

## 2021-05-26 RX ORDER — ACETAMINOPHEN 325 MG/1
650 TABLET ORAL ONCE
Status: CANCELLED | OUTPATIENT
Start: 2021-05-26

## 2021-05-26 RX ORDER — SODIUM CHLORIDE 9 MG/ML
INJECTION, SOLUTION INTRAVENOUS CONTINUOUS
Status: CANCELLED | OUTPATIENT
Start: 2021-05-26

## 2021-05-26 RX ORDER — DIPHENHYDRAMINE HYDROCHLORIDE 50 MG/ML
25 INJECTION INTRAMUSCULAR; INTRAVENOUS PRN
Status: CANCELLED | OUTPATIENT
Start: 2021-05-26

## 2021-05-26 RX ORDER — ACETAMINOPHEN 325 MG/1
650 TABLET ORAL PRN
Status: CANCELLED | OUTPATIENT
Start: 2021-05-26

## 2021-05-26 RX ORDER — 0.9 % SODIUM CHLORIDE 0.9 %
10 VIAL (ML) INJECTION PRN
Status: CANCELLED | OUTPATIENT
Start: 2021-05-26

## 2021-05-26 RX ORDER — 0.9 % SODIUM CHLORIDE 0.9 %
3 VIAL (ML) INJECTION PRN
Status: CANCELLED | OUTPATIENT
Start: 2021-05-26

## 2021-05-27 ENCOUNTER — OUTPATIENT INFUSION SERVICES (OUTPATIENT)
Dept: ONCOLOGY | Facility: MEDICAL CENTER | Age: 26
End: 2021-05-27
Attending: INTERNAL MEDICINE
Payer: COMMERCIAL

## 2021-05-27 VITALS
HEART RATE: 87 BPM | SYSTOLIC BLOOD PRESSURE: 167 MMHG | OXYGEN SATURATION: 100 % | RESPIRATION RATE: 19 BRPM | TEMPERATURE: 98.5 F | DIASTOLIC BLOOD PRESSURE: 121 MMHG

## 2021-05-27 DIAGNOSIS — D64.9 SYMPTOMATIC ANEMIA: ICD-10-CM

## 2021-05-27 LAB
BASOPHILS # BLD AUTO: 1 % (ref 0–1.8)
BASOPHILS # BLD: 0.03 K/UL (ref 0–0.12)
EOSINOPHIL # BLD AUTO: 0.06 K/UL (ref 0–0.51)
EOSINOPHIL NFR BLD: 2 % (ref 0–6.9)
ERYTHROCYTE [DISTWIDTH] IN BLOOD BY AUTOMATED COUNT: 63.1 FL (ref 35.9–50)
HCT VFR BLD AUTO: 31.5 % (ref 37–47)
HGB BLD-MCNC: 9.5 G/DL (ref 12–16)
IMM GRANULOCYTES # BLD AUTO: 0.01 K/UL (ref 0–0.11)
IMM GRANULOCYTES NFR BLD AUTO: 0.3 % (ref 0–0.9)
LYMPHOCYTES # BLD AUTO: 0.45 K/UL (ref 1–4.8)
LYMPHOCYTES NFR BLD: 14.9 % (ref 22–41)
MCH RBC QN AUTO: 28.1 PG (ref 27–33)
MCHC RBC AUTO-ENTMCNC: 30.2 G/DL (ref 33.6–35)
MCV RBC AUTO: 93.2 FL (ref 81.4–97.8)
MONOCYTES # BLD AUTO: 0.28 K/UL (ref 0–0.85)
MONOCYTES NFR BLD AUTO: 9.2 % (ref 0–13.4)
NEUTROPHILS # BLD AUTO: 2.2 K/UL (ref 2–7.15)
NEUTROPHILS NFR BLD: 72.6 % (ref 44–72)
NRBC # BLD AUTO: 0 K/UL
NRBC BLD-RTO: 0 /100 WBC
PLATELET # BLD AUTO: 168 K/UL (ref 164–446)
PMV BLD AUTO: 9.3 FL (ref 9–12.9)
RBC # BLD AUTO: 3.38 M/UL (ref 4.2–5.4)
WBC # BLD AUTO: 3 K/UL (ref 4.8–10.8)

## 2021-05-27 PROCEDURE — 36415 COLL VENOUS BLD VENIPUNCTURE: CPT

## 2021-05-27 PROCEDURE — 85025 COMPLETE CBC W/AUTO DIFF WBC: CPT

## 2021-05-27 RX ORDER — 0.9 % SODIUM CHLORIDE 0.9 %
3 VIAL (ML) INJECTION PRN
Status: CANCELLED | OUTPATIENT
Start: 2021-05-27

## 2021-05-27 RX ORDER — DIPHENHYDRAMINE HYDROCHLORIDE 50 MG/ML
25 INJECTION INTRAMUSCULAR; INTRAVENOUS PRN
Status: CANCELLED | OUTPATIENT
Start: 2021-05-27

## 2021-05-27 RX ORDER — 0.9 % SODIUM CHLORIDE 0.9 %
10 VIAL (ML) INJECTION PRN
Status: CANCELLED | OUTPATIENT
Start: 2021-05-27

## 2021-05-27 RX ORDER — 0.9 % SODIUM CHLORIDE 0.9 %
VIAL (ML) INJECTION PRN
Status: CANCELLED | OUTPATIENT
Start: 2021-05-27

## 2021-05-27 RX ORDER — ACETAMINOPHEN 325 MG/1
650 TABLET ORAL ONCE
Status: CANCELLED | OUTPATIENT
Start: 2021-05-27

## 2021-05-27 RX ORDER — DIPHENHYDRAMINE HCL 25 MG
25 TABLET ORAL ONCE
Status: CANCELLED | OUTPATIENT
Start: 2021-05-27 | End: 2021-05-27

## 2021-05-27 RX ORDER — SODIUM CHLORIDE 9 MG/ML
INJECTION, SOLUTION INTRAVENOUS CONTINUOUS
Status: CANCELLED | OUTPATIENT
Start: 2021-05-27

## 2021-05-27 RX ORDER — ACETAMINOPHEN 325 MG/1
650 TABLET ORAL PRN
Status: CANCELLED | OUTPATIENT
Start: 2021-05-27

## 2021-05-27 RX ORDER — HEPARIN SODIUM (PORCINE) LOCK FLUSH IV SOLN 100 UNIT/ML 100 UNIT/ML
500 SOLUTION INTRAVENOUS PRN
Status: CANCELLED | OUTPATIENT
Start: 2021-05-27

## 2021-05-27 NOTE — PROGRESS NOTES
Pt presented to infusion center for labs. Labs drawn as ordered from [facility] with 23G butterfly needle, gauze and coban dressing placed. Has next appt, discharged home to self care.     Called Lily per pt request to discuss if she meets parameters for transfusion, Lily states understanding that she does not meet parameters and will return for next appointment on 06/10/21.

## 2021-05-29 ENCOUNTER — APPOINTMENT (OUTPATIENT)
Dept: ONCOLOGY | Facility: MEDICAL CENTER | Age: 26
End: 2021-05-29
Attending: INTERNAL MEDICINE
Payer: COMMERCIAL

## 2021-06-09 LAB
FUNGUS SPEC CULT: NORMAL
SIGNIFICANT IND 70042: NORMAL
SITE SITE: NORMAL
SOURCE SOURCE: NORMAL

## 2021-06-10 ENCOUNTER — OUTPATIENT INFUSION SERVICES (OUTPATIENT)
Dept: ONCOLOGY | Facility: MEDICAL CENTER | Age: 26
End: 2021-06-10
Attending: INTERNAL MEDICINE
Payer: COMMERCIAL

## 2021-06-10 VITALS
DIASTOLIC BLOOD PRESSURE: 105 MMHG | RESPIRATION RATE: 18 BRPM | HEART RATE: 84 BPM | TEMPERATURE: 97.5 F | SYSTOLIC BLOOD PRESSURE: 155 MMHG | OXYGEN SATURATION: 98 %

## 2021-06-10 DIAGNOSIS — D64.9 SYMPTOMATIC ANEMIA: ICD-10-CM

## 2021-06-10 LAB
BASOPHILS # BLD AUTO: 1.1 % (ref 0–1.8)
BASOPHILS # BLD: 0.03 K/UL (ref 0–0.12)
EOSINOPHIL # BLD AUTO: 0.04 K/UL (ref 0–0.51)
EOSINOPHIL NFR BLD: 1.5 % (ref 0–6.9)
ERYTHROCYTE [DISTWIDTH] IN BLOOD BY AUTOMATED COUNT: 56.5 FL (ref 35.9–50)
HCT VFR BLD AUTO: 32.5 % (ref 37–47)
HGB BLD-MCNC: 10 G/DL (ref 12–16)
IMM GRANULOCYTES # BLD AUTO: 0 K/UL (ref 0–0.11)
IMM GRANULOCYTES NFR BLD AUTO: 0 % (ref 0–0.9)
LYMPHOCYTES # BLD AUTO: 0.35 K/UL (ref 1–4.8)
LYMPHOCYTES NFR BLD: 12.7 % (ref 22–41)
MCH RBC QN AUTO: 28.7 PG (ref 27–33)
MCHC RBC AUTO-ENTMCNC: 30.8 G/DL (ref 33.6–35)
MCV RBC AUTO: 93.4 FL (ref 81.4–97.8)
MONOCYTES # BLD AUTO: 0.26 K/UL (ref 0–0.85)
MONOCYTES NFR BLD AUTO: 9.5 % (ref 0–13.4)
NEUTROPHILS # BLD AUTO: 2.07 K/UL (ref 2–7.15)
NEUTROPHILS NFR BLD: 75.2 % (ref 44–72)
NRBC # BLD AUTO: 0 K/UL
NRBC BLD-RTO: 0 /100 WBC
PLATELET # BLD AUTO: 138 K/UL (ref 164–446)
PMV BLD AUTO: 10.2 FL (ref 9–12.9)
RBC # BLD AUTO: 3.48 M/UL (ref 4.2–5.4)
WBC # BLD AUTO: 2.8 K/UL (ref 4.8–10.8)

## 2021-06-10 PROCEDURE — 36415 COLL VENOUS BLD VENIPUNCTURE: CPT

## 2021-06-10 PROCEDURE — 85025 COMPLETE CBC W/AUTO DIFF WBC: CPT

## 2021-06-10 RX ORDER — 0.9 % SODIUM CHLORIDE 0.9 %
3 VIAL (ML) INJECTION PRN
Status: CANCELLED | OUTPATIENT
Start: 2021-06-10

## 2021-06-10 RX ORDER — SODIUM CHLORIDE 9 MG/ML
INJECTION, SOLUTION INTRAVENOUS CONTINUOUS
Status: CANCELLED | OUTPATIENT
Start: 2021-06-10

## 2021-06-10 RX ORDER — 0.9 % SODIUM CHLORIDE 0.9 %
10 VIAL (ML) INJECTION PRN
Status: CANCELLED | OUTPATIENT
Start: 2021-06-10

## 2021-06-10 RX ORDER — 0.9 % SODIUM CHLORIDE 0.9 %
VIAL (ML) INJECTION PRN
Status: CANCELLED | OUTPATIENT
Start: 2021-06-10

## 2021-06-10 RX ORDER — HEPARIN SODIUM (PORCINE) LOCK FLUSH IV SOLN 100 UNIT/ML 100 UNIT/ML
500 SOLUTION INTRAVENOUS PRN
Status: CANCELLED | OUTPATIENT
Start: 2021-06-10

## 2021-06-10 RX ORDER — ACETAMINOPHEN 325 MG/1
650 TABLET ORAL ONCE
Status: CANCELLED | OUTPATIENT
Start: 2021-06-10

## 2021-06-10 RX ORDER — DIPHENHYDRAMINE HYDROCHLORIDE 50 MG/ML
25 INJECTION INTRAMUSCULAR; INTRAVENOUS PRN
Status: CANCELLED | OUTPATIENT
Start: 2021-06-10

## 2021-06-10 RX ORDER — DIPHENHYDRAMINE HCL 25 MG
25 TABLET ORAL ONCE
Status: CANCELLED | OUTPATIENT
Start: 2021-06-10 | End: 2021-06-10

## 2021-06-10 RX ORDER — ACETAMINOPHEN 325 MG/1
650 TABLET ORAL PRN
Status: CANCELLED | OUTPATIENT
Start: 2021-06-10

## 2021-06-10 NOTE — PROGRESS NOTES
Patient arrived ambulatory to IS for CBC/COD.  Lab drawn from left a/c, gauze/coban placed.  Confirmed appointment for Saturday for transfusion and she ambulated out of clinic in no apparent distress.    0928:  Hgb 10.0.  Patient notified of lab result and appointment cancelled on Saturday for blood transfusion.  She verbalized understanding.

## 2021-06-12 ENCOUNTER — APPOINTMENT (OUTPATIENT)
Dept: ONCOLOGY | Facility: MEDICAL CENTER | Age: 26
End: 2021-06-12
Attending: INTERNAL MEDICINE
Payer: COMMERCIAL

## 2021-06-15 ENCOUNTER — HOSPITAL ENCOUNTER (EMERGENCY)
Facility: MEDICAL CENTER | Age: 26
End: 2021-06-16
Attending: EMERGENCY MEDICINE
Payer: COMMERCIAL

## 2021-06-15 VITALS
SYSTOLIC BLOOD PRESSURE: 169 MMHG | HEIGHT: 67 IN | BODY MASS INDEX: 18.51 KG/M2 | DIASTOLIC BLOOD PRESSURE: 98 MMHG | RESPIRATION RATE: 16 BRPM | TEMPERATURE: 97.8 F | WEIGHT: 117.95 LBS | OXYGEN SATURATION: 100 % | HEART RATE: 63 BPM

## 2021-06-15 DIAGNOSIS — R11.0 NAUSEA: ICD-10-CM

## 2021-06-15 DIAGNOSIS — R10.13 EPIGASTRIC ABDOMINAL PAIN: ICD-10-CM

## 2021-06-15 LAB
ALBUMIN SERPL BCP-MCNC: 3.7 G/DL (ref 3.2–4.9)
ALBUMIN/GLOB SERPL: 0.8 G/DL
ALP SERPL-CCNC: 100 U/L (ref 30–99)
ALT SERPL-CCNC: 10 U/L (ref 2–50)
ANION GAP SERPL CALC-SCNC: 7 MMOL/L (ref 7–16)
AST SERPL-CCNC: 16 U/L (ref 12–45)
BASOPHILS # BLD AUTO: 0.9 % (ref 0–1.8)
BASOPHILS # BLD: 0.03 K/UL (ref 0–0.12)
BILIRUB SERPL-MCNC: 0.3 MG/DL (ref 0.1–1.5)
BUN SERPL-MCNC: 41 MG/DL (ref 8–22)
CALCIUM SERPL-MCNC: 9.8 MG/DL (ref 8.4–10.2)
CHLORIDE SERPL-SCNC: 89 MMOL/L (ref 96–112)
CO2 SERPL-SCNC: 37 MMOL/L (ref 20–33)
CREAT SERPL-MCNC: 5.77 MG/DL (ref 0.5–1.4)
EOSINOPHIL # BLD AUTO: 0.05 K/UL (ref 0–0.51)
EOSINOPHIL NFR BLD: 1.6 % (ref 0–6.9)
ERYTHROCYTE [DISTWIDTH] IN BLOOD BY AUTOMATED COUNT: 55.8 FL (ref 35.9–50)
GLOBULIN SER CALC-MCNC: 4.7 G/DL (ref 1.9–3.5)
GLUCOSE SERPL-MCNC: 96 MG/DL (ref 65–99)
HCG SERPL QL: NEGATIVE
HCT VFR BLD AUTO: 34.7 % (ref 37–47)
HGB BLD-MCNC: 10.4 G/DL (ref 12–16)
IMM GRANULOCYTES # BLD AUTO: 0.01 K/UL (ref 0–0.11)
IMM GRANULOCYTES NFR BLD AUTO: 0.3 % (ref 0–0.9)
LIPASE SERPL-CCNC: 29 U/L (ref 7–58)
LYMPHOCYTES # BLD AUTO: 0.52 K/UL (ref 1–4.8)
LYMPHOCYTES NFR BLD: 16.5 % (ref 22–41)
MCH RBC QN AUTO: 28.3 PG (ref 27–33)
MCHC RBC AUTO-ENTMCNC: 30 G/DL (ref 33.6–35)
MCV RBC AUTO: 94.6 FL (ref 81.4–97.8)
MONOCYTES # BLD AUTO: 0.26 K/UL (ref 0–0.85)
MONOCYTES NFR BLD AUTO: 8.2 % (ref 0–13.4)
NEUTROPHILS # BLD AUTO: 2.29 K/UL (ref 2–7.15)
NEUTROPHILS NFR BLD: 72.5 % (ref 44–72)
NRBC # BLD AUTO: 0 K/UL
NRBC BLD-RTO: 0 /100 WBC
PLATELET # BLD AUTO: 147 K/UL (ref 164–446)
PMV BLD AUTO: 9.1 FL (ref 9–12.9)
POTASSIUM SERPL-SCNC: 5.3 MMOL/L (ref 3.6–5.5)
PROT SERPL-MCNC: 8.4 G/DL (ref 6–8.2)
RBC # BLD AUTO: 3.67 M/UL (ref 4.2–5.4)
SODIUM SERPL-SCNC: 133 MMOL/L (ref 135–145)
WBC # BLD AUTO: 3.2 K/UL (ref 4.8–10.8)

## 2021-06-15 PROCEDURE — 85025 COMPLETE CBC W/AUTO DIFF WBC: CPT

## 2021-06-15 PROCEDURE — 83690 ASSAY OF LIPASE: CPT

## 2021-06-15 PROCEDURE — 96374 THER/PROPH/DIAG INJ IV PUSH: CPT

## 2021-06-15 PROCEDURE — 80053 COMPREHEN METABOLIC PANEL: CPT

## 2021-06-15 PROCEDURE — 700105 HCHG RX REV CODE 258: Performed by: EMERGENCY MEDICINE

## 2021-06-15 PROCEDURE — 99284 EMERGENCY DEPT VISIT MOD MDM: CPT

## 2021-06-15 PROCEDURE — 84703 CHORIONIC GONADOTROPIN ASSAY: CPT

## 2021-06-15 PROCEDURE — 96375 TX/PRO/DX INJ NEW DRUG ADDON: CPT

## 2021-06-15 PROCEDURE — 700111 HCHG RX REV CODE 636 W/ 250 OVERRIDE (IP): Performed by: EMERGENCY MEDICINE

## 2021-06-15 PROCEDURE — C9113 INJ PANTOPRAZOLE SODIUM, VIA: HCPCS | Performed by: EMERGENCY MEDICINE

## 2021-06-15 RX ORDER — SODIUM CHLORIDE 9 MG/ML
1000 INJECTION, SOLUTION INTRAVENOUS ONCE
Status: COMPLETED | OUTPATIENT
Start: 2021-06-15 | End: 2021-06-15

## 2021-06-15 RX ORDER — PANTOPRAZOLE SODIUM 40 MG/10ML
40 INJECTION, POWDER, LYOPHILIZED, FOR SOLUTION INTRAVENOUS ONCE
Status: COMPLETED | OUTPATIENT
Start: 2021-06-15 | End: 2021-06-15

## 2021-06-15 RX ORDER — ONDANSETRON 2 MG/ML
4 INJECTION INTRAMUSCULAR; INTRAVENOUS ONCE
Status: COMPLETED | OUTPATIENT
Start: 2021-06-15 | End: 2021-06-15

## 2021-06-15 RX ORDER — PANTOPRAZOLE SODIUM 40 MG/1
40 TABLET, DELAYED RELEASE ORAL 2 TIMES DAILY
Qty: 30 TABLET | Refills: 3 | Status: SHIPPED | OUTPATIENT
Start: 2021-06-15 | End: 2023-01-01

## 2021-06-15 RX ORDER — MORPHINE SULFATE 4 MG/ML
4 INJECTION, SOLUTION INTRAMUSCULAR; INTRAVENOUS ONCE
Status: COMPLETED | OUTPATIENT
Start: 2021-06-15 | End: 2021-06-15

## 2021-06-15 RX ADMIN — ONDANSETRON 4 MG: 2 INJECTION INTRAMUSCULAR; INTRAVENOUS at 23:19

## 2021-06-15 RX ADMIN — PANTOPRAZOLE SODIUM 40 MG: 40 INJECTION, POWDER, LYOPHILIZED, FOR SOLUTION INTRAVENOUS at 23:24

## 2021-06-15 RX ADMIN — MORPHINE SULFATE 4 MG: 4 INJECTION INTRAVENOUS at 23:19

## 2021-06-15 RX ADMIN — SODIUM CHLORIDE 1000 ML: 9 INJECTION, SOLUTION INTRAVENOUS at 23:18

## 2021-06-15 ASSESSMENT — FIBROSIS 4 INDEX: FIB4 SCORE: 0.82

## 2021-06-16 NOTE — ED PROVIDER NOTES
"ED Provider Note    CHIEF COMPLAINT  Chief Complaint   Patient presents with   • Abdominal Pain     Epigastric x 2 d No relief with OTC antacids   • Nausea     No emesis       HPI  Lily Nicole is a 25 y.o. female who presents for evaluation of epigastric abdominal pain which is chronic but worse than usual associated with nausea. She reports that she does at times have some vomiting associated with have some emesis and acid reflux-like sensation. She has been able to keep fluids down. The patient is on Prilosec but states that she ran out and has not had any in 2 days. 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 She has had her chronic but worsening epigastric abdominal pain, states that she has been under the care of a gastroenterologist and tells me that there is really not much more for him to do. She states in the past she has had episodes like this and has been successfully treated with intravenous Protonix.    REVIEW OF SYSTEMS  Negative for fever, rash, chest pain, dyspnea, headache, back pain. All other systems are negative.     PAST MEDICAL HISTORY  Past Medical History:   Diagnosis Date   • Anemia 01/17/2018   • AVF (arteriovenous fistula) ([facility])     Right Arm   • Chest pain    • Chest tightness    • Daytime sleepiness    • Dialysis patient ([facility])      dialysis, M,W,F Elizabeth/Joni   • Difficulty breathing    • ESRD (end stage renal disease) on dialysis ([facility]) 01/17/2018    Twan Fu   • Gasping for breath    • Heart burn    • Hypertension 01/17/2018    \"Controlled with medication\"   • Indigestion    • Lupus ([facility])    • Migraines 01/17/2018   • Painful breathing    • Palpitations    • Seizure ([facility]) 2013    from high blood pressure, reports 1 time event   • Shortness of breath    • Swelling of lower extremity    • Wheezing        FAMILY HISTORY  Family History   Problem Relation Age of Onset   • Diabetes Paternal Grandmother        SOCIAL HISTORY  Social History "     Tobacco Use   • Smoking status: Never Smoker   • Smokeless tobacco: Never Used   Vaping Use   • Vaping Use: Never used   Substance Use Topics   • Alcohol use: No   • Drug use: No       SURGICAL HISTORY  Past Surgical History:   Procedure Laterality Date   • PB BRONCHOSCOPY,DIAGNOSTIC Bilateral 5/13/2021    Procedure: BRONCHOSCOPY, BRONCHOALVEOLAR LAVAGE;  Surgeon: William Spangler M.D.;  Location: [facility];  Service: Pulmonary   • PB UPPER GI ENDOSCOPY,DIAGNOSIS N/A 3/19/2021    Procedure: GASTROSCOPY;  Surgeon: Waylon Mcqueen M.D.;  Location: [facility];  Service: Gastroenterology   • PB UPPER GI ENDOSCOPY,CTRL BLEED  3/19/2021    Procedure: GASTROSCOPY, WITH ARGON PLASMA COAGULATION;  Surgeon: Waylon Mcqueen M.D.;  Location: [facility];  Service: Gastroenterology   • PB UPPER GI ENDOSCOPY,DIAGNOSIS  3/5/2021    Procedure: GASTROSCOPY - W/HEMOSTASIS;  Surgeon: Ej Silva M.D.;  Location: [facility];  Service: Gastroenterology   • PB COLONOSCOPY,DIAGNOSTIC  1/8/2021    Procedure: COLONOSCOPY;  Surgeon: Herbert Contreras M.D.;  Location: [facility];  Service: Gastroenterology   • PB UPPER GI ENDOSCOPY,DIAGNOSIS  1/8/2021    Procedure: GASTROSCOPY;  Surgeon: Herbert Contreras M.D.;  Location: [facility];  Service: Gastroenterology   • PB UPPER GI ENDOSCOPY,CTRL BLEED  1/8/2021    Procedure: GASTROSCOPY, WITH ARGON PLASMA COAGULATION;  Surgeon: Herbert Contreras M.D.;  Location: [facility];  Service: Gastroenterology   • PB UPPER GI ENDOSCOPY,CTRL BLEED  11/12/2020    Procedure: GASTROSCOPY, WITH ARGON PLASMA COAGULATION;  Surgeon: Gadiel Whitney M.D.;  Location: [facility];  Service: Gastroenterology   • PB UPPER GI ENDOSCOPY,BIOPSY  11/12/2020    Procedure: GASTROSCOPY, WITH BIOPSY;  Surgeon: Gadiel Whitney M.D.;  Location: [facility];  Service: Gastroenterology   • GASTROSCOPY-ENDO  11/12/2020    Procedure:  "GASTROSCOPY;  Surgeon: Gadiel Whitney M.D.;  Location: SURGERY [facility];  Service: Gastroenterology   • GASTROSCOPY-ENDO  9/18/2020    Procedure: GASTROSCOPY;  Surgeon: Gadiel Whitney M.D.;  Location: SURGERY [facility];  Service: Gastroenterology   • GASTROSCOPY N/A 5/30/2020    Procedure: GASTROSCOPY;  Surgeon: Waylon Mcqueen M.D.;  Location: SURGERY [facility];  Service: Gastroenterology   • GASTROSCOPY-ENDO  12/9/2019    Procedure: GASTROSCOPY;  Surgeon: Aaron Kam M.D.;  Location: [facility];  Service: Gastroenterology   • ANGIOPLASTY  01/17/2018    \"Right Arm AV-Fistulagram & Angioplastyx3\"   • ULI BY LAPAROSCOPY  4/5/2010    Performed by SYED MARTELL at SURGERY [facility] ORS   • AV FISTULA CREATION Right    • OTHER      renal biopsy x 3   • OTHER      bone marrow biopsy       CURRENT MEDICATIONS  I personally reviewed the medication list in the charting documentation.     ALLERGIES  Allergies   Allergen Reactions   • Cephalexin Rash     .   • Clindamycin Rash     .   • Methylprednisolone Unspecified     Anxious   • Metoprolol Rash     .   • Maxipime [Cefepime] Itching   • Norco [Hydrocodone-Acetaminophen] Nausea       MEDICAL RECORD  I have reviewed patient's medical record and pertinent results are listed above.      PHYSICAL EXAM  VITAL SIGNS: BP (!) 168/115   Pulse 74   Temp 36.6 °C (97.8 °F) (Temporal)   Resp 16   Ht 1.702 m (5' 7\")   Wt 53.5 kg (117 lb 15.1 oz)   LMP 06/07/2021 (Exact Date)   SpO2 90%   BMI 18.47 kg/m²    Constitutional: Chronically ill-appearing but no acute distress.  Awake and alert, not toxic nor ill in appearance.  HENT: Normocephalic, no obvious evidence of acute trauma.  Eyes: No scleral icterus. Normal conjunctiva   Neck: Comfortable movement without any obvious restriction in the range of motion.  Cardiovascular: Upon ascultation I appreciate a regular heart rhythm and a normal rate.   Thorax & Lungs: Normal nonlabored " respirations.  Upon application of the stethoscope for auscultation I find there to be no associated chest wall tenderness.  I appreciate no wheezing, rhonchi or rales. There is normal air movement.    Abdomen: The abdomen is not visibly distended. Upon palpation, find there to be moderate epigastric tenderness but no rebound, no guarding  Skin: The exposed portions of skin reveal no obvious rash or other abnormalities.  Neurologic: Alert & oriented. No focal deficits observed.   Psychiatric: Normal affect appropriate for the clinical situation.    DIAGNOSTIC STUDIES / PROCEDURES    LABS/EKGs  Results for orders placed or performed during the hospital encounter of 06/15/21   CBC WITH DIFFERENTIAL   Result Value Ref Range    WBC 3.2 (L) 4.8 - 10.8 K/uL    RBC 3.67 (L) 4.20 - 5.40 M/uL    Hemoglobin 10.4 (L) 12.0 - 16.0 g/dL    Hematocrit 34.7 (L) 37.0 - 47.0 %    MCV 94.6 81.4 - 97.8 fL    MCH 28.3 27.0 - 33.0 pg    MCHC 30.0 (L) 33.6 - 35.0 g/dL    RDW 55.8 (H) 35.9 - 50.0 fL    Platelet Count 147 (L) 164 - 446 K/uL    MPV 9.1 9.0 - 12.9 fL    Neutrophils-Polys 72.50 (H) 44.00 - 72.00 %    Lymphocytes 16.50 (L) 22.00 - 41.00 %    Monocytes 8.20 0.00 - 13.40 %    Eosinophils 1.60 0.00 - 6.90 %    Basophils 0.90 0.00 - 1.80 %    Immature Granulocytes 0.30 0.00 - 0.90 %    Nucleated RBC 0.00 /100 WBC    Neutrophils (Absolute) 2.29 2.00 - 7.15 K/uL    Lymphs (Absolute) 0.52 (L) 1.00 - 4.80 K/uL    Monos (Absolute) 0.26 0.00 - 0.85 K/uL    Eos (Absolute) 0.05 0.00 - 0.51 K/uL    Baso (Absolute) 0.03 0.00 - 0.12 K/uL    Immature Granulocytes (abs) 0.01 0.00 - 0.11 K/uL    NRBC (Absolute) 0.00 K/uL   COMP METABOLIC PANEL   Result Value Ref Range    Sodium 133 (L) 135 - 145 mmol/L    Potassium 5.3 3.6 - 5.5 mmol/L    Chloride 89 (L) 96 - 112 mmol/L    Co2 37 (H) 20 - 33 mmol/L    Anion Gap 7.0 7.0 - 16.0    Glucose 96 65 - 99 mg/dL    Bun 41 (H) 8 - 22 mg/dL    Creatinine 5.77 (HH) 0.50 - 1.40 mg/dL    Calcium 9.8 8.4 -  10.2 mg/dL    AST(SGOT) 16 12 - 45 U/L    ALT(SGPT) 10 2 - 50 U/L    Alkaline Phosphatase 100 (H) 30 - 99 U/L    Total Bilirubin 0.3 0.1 - 1.5 mg/dL    Albumin 3.7 3.2 - 4.9 g/dL    Total Protein 8.4 (H) 6.0 - 8.2 g/dL    Globulin 4.7 (H) 1.9 - 3.5 g/dL    A-G Ratio 0.8 g/dL   LIPASE   Result Value Ref Range    Lipase 29 7 - 58 U/L   HCG QUAL SERUM   Result Value Ref Range    Beta-Hcg Qualitative Serum Negative Negative   ESTIMATED GFR   Result Value Ref Range    GFR If  11 (A) >60 mL/min/1.73 m 2    GFR If Non African American 9 (A) >60 mL/min/1.73 m 2        COURSE & MEDICAL DECISION MAKING  I have reviewed any medical record information, laboratory studies and radiographic results as noted above.  Differential diagnoses includes: Pancreatitis, hepatitis, PUD/gastritis, dehydration, electrolyte abnormalities, anemia    Encounter Summary: This is a very pleasant 25 y.o. female who unfortunately required evaluation in the emergency department today with worsening chronic epigastric abdominal pain on Protonix at home, ran out of her Protonix and has had a flareup since, associated with vomiting, she has however been able to keep some liquids down. No peritonitis on exam. She states in the past has been treated with IV Protonix not helped significantly. She states that she will vomit with any sort of GI cocktail. She'll be treated with morphine, Zofran and Protonix with IV fluids, will check blood work and she will be reevaluated ------ blood work reveals chronic renal failure with some associated metabolic changes but otherwise reassuring, she is anemic but better than she has been in the recent past.  At this point the patient is feeling much better.  She is being discharged home in stable condition with strict return instructions provided      DISPOSITION: Discharged home in stable condition      FINAL IMPRESSION  1. Epigastric abdominal pain    2. Nausea           This dictation was created using  voice recognition software. The accuracy of the dictation is limited to the abilities of the software. I expect there may be some errors of grammar and possibly content. The nursing notes were reviewed and certain aspects of this information were incorporated into this note.    Electronically signed by: Sidney Schreiber M.D., 6/15/2021 11:03 PM

## 2021-06-24 ENCOUNTER — OUTPATIENT INFUSION SERVICES (OUTPATIENT)
Dept: ONCOLOGY | Facility: MEDICAL CENTER | Age: 26
End: 2021-06-24
Attending: INTERNAL MEDICINE
Payer: COMMERCIAL

## 2021-06-24 VITALS
BODY MASS INDEX: 19.76 KG/M2 | RESPIRATION RATE: 18 BRPM | WEIGHT: 118.61 LBS | OXYGEN SATURATION: 96 % | HEART RATE: 74 BPM | SYSTOLIC BLOOD PRESSURE: 150 MMHG | HEIGHT: 65 IN | DIASTOLIC BLOOD PRESSURE: 98 MMHG | TEMPERATURE: 97 F

## 2021-06-24 DIAGNOSIS — D64.9 SYMPTOMATIC ANEMIA: ICD-10-CM

## 2021-06-24 LAB
ABO GROUP BLD: NORMAL
ANISOCYTOSIS BLD QL SMEAR: ABNORMAL
BASOPHILS # BLD AUTO: 1.2 % (ref 0–1.8)
BASOPHILS # BLD: 0.03 K/UL (ref 0–0.12)
BLD GP AB SCN SERPL QL: NORMAL
COMMENT 1642: NORMAL
EOSINOPHIL # BLD AUTO: 0.02 K/UL (ref 0–0.51)
EOSINOPHIL NFR BLD: 0.8 % (ref 0–6.9)
ERYTHROCYTE [DISTWIDTH] IN BLOOD BY AUTOMATED COUNT: 54.1 FL (ref 35.9–50)
HCT VFR BLD AUTO: 32.8 % (ref 37–47)
HGB BLD-MCNC: 9.6 G/DL (ref 12–16)
IMM GRANULOCYTES # BLD AUTO: 0 K/UL (ref 0–0.11)
IMM GRANULOCYTES NFR BLD AUTO: 0 % (ref 0–0.9)
LYMPHOCYTES # BLD AUTO: 0.31 K/UL (ref 1–4.8)
LYMPHOCYTES NFR BLD: 12.2 % (ref 22–41)
MCH RBC QN AUTO: 27.2 PG (ref 27–33)
MCHC RBC AUTO-ENTMCNC: 29.3 G/DL (ref 33.6–35)
MCV RBC AUTO: 92.9 FL (ref 81.4–97.8)
MONOCYTES # BLD AUTO: 0.29 K/UL (ref 0–0.85)
MONOCYTES NFR BLD AUTO: 11.4 % (ref 0–13.4)
MORPHOLOGY BLD-IMP: NORMAL
NEUTROPHILS # BLD AUTO: 1.9 K/UL (ref 2–7.15)
NEUTROPHILS NFR BLD: 74.4 % (ref 44–72)
NRBC # BLD AUTO: 0 K/UL
NRBC BLD-RTO: 0 /100 WBC
PLATELET # BLD AUTO: 155 K/UL (ref 164–446)
PLATELET BLD QL SMEAR: NORMAL
PMV BLD AUTO: 10.8 FL (ref 9–12.9)
POLYCHROMASIA BLD QL SMEAR: NORMAL
RBC # BLD AUTO: 3.53 M/UL (ref 4.2–5.4)
RBC BLD AUTO: PRESENT
RH BLD: NORMAL
WBC # BLD AUTO: 2.6 K/UL (ref 4.8–10.8)

## 2021-06-24 PROCEDURE — 85025 COMPLETE CBC W/AUTO DIFF WBC: CPT

## 2021-06-24 PROCEDURE — 86850 RBC ANTIBODY SCREEN: CPT

## 2021-06-24 PROCEDURE — 86901 BLOOD TYPING SEROLOGIC RH(D): CPT

## 2021-06-24 PROCEDURE — 86900 BLOOD TYPING SEROLOGIC ABO: CPT

## 2021-06-24 PROCEDURE — 36415 COLL VENOUS BLD VENIPUNCTURE: CPT

## 2021-06-24 RX ORDER — HEPARIN SODIUM (PORCINE) LOCK FLUSH IV SOLN 100 UNIT/ML 100 UNIT/ML
500 SOLUTION INTRAVENOUS PRN
Status: CANCELLED | OUTPATIENT
Start: 2021-06-24

## 2021-06-24 RX ORDER — 0.9 % SODIUM CHLORIDE 0.9 %
VIAL (ML) INJECTION PRN
Status: CANCELLED | OUTPATIENT
Start: 2021-06-24

## 2021-06-24 RX ORDER — SODIUM CHLORIDE 9 MG/ML
INJECTION, SOLUTION INTRAVENOUS CONTINUOUS
Status: CANCELLED | OUTPATIENT
Start: 2021-06-24

## 2021-06-24 RX ORDER — ACETAMINOPHEN 325 MG/1
650 TABLET ORAL ONCE
Status: CANCELLED | OUTPATIENT
Start: 2021-06-24

## 2021-06-24 RX ORDER — 0.9 % SODIUM CHLORIDE 0.9 %
3 VIAL (ML) INJECTION PRN
Status: CANCELLED | OUTPATIENT
Start: 2021-06-24

## 2021-06-24 RX ORDER — ACETAMINOPHEN 325 MG/1
650 TABLET ORAL PRN
Status: CANCELLED | OUTPATIENT
Start: 2021-06-24

## 2021-06-24 RX ORDER — 0.9 % SODIUM CHLORIDE 0.9 %
10 VIAL (ML) INJECTION PRN
Status: CANCELLED | OUTPATIENT
Start: 2021-06-24

## 2021-06-24 RX ORDER — DIPHENHYDRAMINE HCL 25 MG
25 TABLET ORAL ONCE
Status: CANCELLED | OUTPATIENT
Start: 2021-06-24 | End: 2021-06-24

## 2021-06-24 RX ORDER — DIPHENHYDRAMINE HYDROCHLORIDE 50 MG/ML
25 INJECTION INTRAMUSCULAR; INTRAVENOUS PRN
Status: CANCELLED | OUTPATIENT
Start: 2021-06-24

## 2021-06-24 ASSESSMENT — FIBROSIS 4 INDEX: FIB4 SCORE: 0.79

## 2021-06-24 NOTE — PROGRESS NOTES
Pt presents to [facility] for possible blood products. Butterfly needle used in LAC; brisk blood return observed and pt tolerated well. Labs drawn per orders and next appointment confirmed and education provided. Pt discharged to self care with all personal belongings and in NAD    Labs resulted and not within treatable parameters. This RN called pt to update her on results; pt verbalized understanding and her Saturday appointment was cancelled.

## 2021-06-26 ENCOUNTER — APPOINTMENT (OUTPATIENT)
Dept: ONCOLOGY | Facility: MEDICAL CENTER | Age: 26
End: 2021-06-26
Attending: INTERNAL MEDICINE
Payer: COMMERCIAL

## 2021-07-01 ENCOUNTER — APPOINTMENT (OUTPATIENT)
Dept: RADIOLOGY | Facility: MEDICAL CENTER | Age: 26
End: 2021-07-01
Attending: EMERGENCY MEDICINE
Payer: COMMERCIAL

## 2021-07-01 ENCOUNTER — HOSPITAL ENCOUNTER (EMERGENCY)
Facility: MEDICAL CENTER | Age: 26
End: 2021-07-01
Attending: EMERGENCY MEDICINE
Payer: COMMERCIAL

## 2021-07-01 VITALS
HEART RATE: 62 BPM | SYSTOLIC BLOOD PRESSURE: 178 MMHG | BODY MASS INDEX: 18.2 KG/M2 | RESPIRATION RATE: 20 BRPM | WEIGHT: 115.96 LBS | TEMPERATURE: 98.5 F | OXYGEN SATURATION: 92 % | HEIGHT: 67 IN | DIASTOLIC BLOOD PRESSURE: 112 MMHG

## 2021-07-01 DIAGNOSIS — R10.13 EPIGASTRIC PAIN: ICD-10-CM

## 2021-07-01 LAB
ALBUMIN SERPL BCP-MCNC: 3.3 G/DL (ref 3.2–4.9)
ALBUMIN/GLOB SERPL: 0.8 G/DL
ALP SERPL-CCNC: 79 U/L (ref 30–99)
ALT SERPL-CCNC: 7 U/L (ref 2–50)
ANION GAP SERPL CALC-SCNC: 8 MMOL/L (ref 7–16)
AST SERPL-CCNC: 14 U/L (ref 12–45)
BASO STIPL BLD QL SMEAR: NORMAL
BASOPHILS # BLD AUTO: 0.3 % (ref 0–1.8)
BASOPHILS # BLD: 0.01 K/UL (ref 0–0.12)
BILIRUB SERPL-MCNC: 0.4 MG/DL (ref 0.1–1.5)
BUN SERPL-MCNC: 29 MG/DL (ref 8–22)
CALCIUM SERPL-MCNC: 9.1 MG/DL (ref 8.4–10.2)
CHLORIDE SERPL-SCNC: 85 MMOL/L (ref 96–112)
CO2 SERPL-SCNC: 34 MMOL/L (ref 20–33)
COMMENT 1642: NORMAL
CREAT SERPL-MCNC: 4.4 MG/DL (ref 0.5–1.4)
EOSINOPHIL # BLD AUTO: 0.03 K/UL (ref 0–0.51)
EOSINOPHIL NFR BLD: 0.8 % (ref 0–6.9)
ERYTHROCYTE [DISTWIDTH] IN BLOOD BY AUTOMATED COUNT: 53.6 FL (ref 35.9–50)
GLOBULIN SER CALC-MCNC: 4.2 G/DL (ref 1.9–3.5)
GLUCOSE SERPL-MCNC: 96 MG/DL (ref 65–99)
HCG SERPL QL: NEGATIVE
HCT VFR BLD AUTO: 29.2 % (ref 37–47)
HGB BLD-MCNC: 8.7 G/DL (ref 12–16)
HYPOCHROMIA BLD QL SMEAR: ABNORMAL
IMM GRANULOCYTES # BLD AUTO: 0.01 K/UL (ref 0–0.11)
IMM GRANULOCYTES NFR BLD AUTO: 0.3 % (ref 0–0.9)
LG PLATELETS BLD QL SMEAR: NORMAL
LIPASE SERPL-CCNC: 22 U/L (ref 7–58)
LYMPHOCYTES # BLD AUTO: 0.45 K/UL (ref 1–4.8)
LYMPHOCYTES NFR BLD: 11.8 % (ref 22–41)
MCH RBC QN AUTO: 27.4 PG (ref 27–33)
MCHC RBC AUTO-ENTMCNC: 29.8 G/DL (ref 33.6–35)
MCV RBC AUTO: 92.1 FL (ref 81.4–97.8)
MONOCYTES # BLD AUTO: 0.34 K/UL (ref 0–0.85)
MONOCYTES NFR BLD AUTO: 8.9 % (ref 0–13.4)
NEUTROPHILS # BLD AUTO: 2.98 K/UL (ref 2–7.15)
NEUTROPHILS NFR BLD: 77.9 % (ref 44–72)
NRBC # BLD AUTO: 0 K/UL
NRBC BLD-RTO: 0 /100 WBC
OVALOCYTES BLD QL SMEAR: NORMAL
PLATELET # BLD AUTO: 146 K/UL (ref 164–446)
PLATELET BLD QL SMEAR: NORMAL
PMV BLD AUTO: 10.2 FL (ref 9–12.9)
POIKILOCYTOSIS BLD QL SMEAR: NORMAL
POTASSIUM SERPL-SCNC: 4.3 MMOL/L (ref 3.6–5.5)
PROT SERPL-MCNC: 7.5 G/DL (ref 6–8.2)
RBC # BLD AUTO: 3.17 M/UL (ref 4.2–5.4)
RBC BLD AUTO: PRESENT
SODIUM SERPL-SCNC: 127 MMOL/L (ref 135–145)
WBC # BLD AUTO: 3.8 K/UL (ref 4.8–10.8)

## 2021-07-01 PROCEDURE — 80053 COMPREHEN METABOLIC PANEL: CPT

## 2021-07-01 PROCEDURE — C9113 INJ PANTOPRAZOLE SODIUM, VIA: HCPCS | Performed by: EMERGENCY MEDICINE

## 2021-07-01 PROCEDURE — 84703 CHORIONIC GONADOTROPIN ASSAY: CPT

## 2021-07-01 PROCEDURE — 85025 COMPLETE CBC W/AUTO DIFF WBC: CPT

## 2021-07-01 PROCEDURE — 96375 TX/PRO/DX INJ NEW DRUG ADDON: CPT

## 2021-07-01 PROCEDURE — 700111 HCHG RX REV CODE 636 W/ 250 OVERRIDE (IP): Performed by: EMERGENCY MEDICINE

## 2021-07-01 PROCEDURE — 71046 X-RAY EXAM CHEST 2 VIEWS: CPT

## 2021-07-01 PROCEDURE — 83690 ASSAY OF LIPASE: CPT

## 2021-07-01 PROCEDURE — 700105 HCHG RX REV CODE 258

## 2021-07-01 PROCEDURE — 96374 THER/PROPH/DIAG INJ IV PUSH: CPT

## 2021-07-01 PROCEDURE — 99284 EMERGENCY DEPT VISIT MOD MDM: CPT

## 2021-07-01 RX ORDER — MORPHINE SULFATE 4 MG/ML
4 INJECTION, SOLUTION INTRAMUSCULAR; INTRAVENOUS ONCE
Status: COMPLETED | OUTPATIENT
Start: 2021-07-01 | End: 2021-07-01

## 2021-07-01 RX ORDER — LORAZEPAM 2 MG/ML
0.5 INJECTION INTRAMUSCULAR ONCE
Status: COMPLETED | OUTPATIENT
Start: 2021-07-01 | End: 2021-07-01

## 2021-07-01 RX ORDER — SODIUM CHLORIDE 9 MG/ML
INJECTION, SOLUTION INTRAVENOUS
Status: COMPLETED
Start: 2021-07-01 | End: 2021-07-01

## 2021-07-01 RX ORDER — PANTOPRAZOLE SODIUM 40 MG/10ML
80 INJECTION, POWDER, LYOPHILIZED, FOR SOLUTION INTRAVENOUS ONCE
Status: COMPLETED | OUTPATIENT
Start: 2021-07-01 | End: 2021-07-01

## 2021-07-01 RX ORDER — ONDANSETRON 2 MG/ML
4 INJECTION INTRAMUSCULAR; INTRAVENOUS ONCE
Status: COMPLETED | OUTPATIENT
Start: 2021-07-01 | End: 2021-07-01

## 2021-07-01 RX ADMIN — MORPHINE SULFATE 4 MG: 4 INJECTION INTRAVENOUS at 19:54

## 2021-07-01 RX ADMIN — PANTOPRAZOLE SODIUM 80 MG: 40 INJECTION, POWDER, LYOPHILIZED, FOR SOLUTION INTRAVENOUS at 20:01

## 2021-07-01 RX ADMIN — ONDANSETRON 4 MG: 2 INJECTION INTRAMUSCULAR; INTRAVENOUS at 19:54

## 2021-07-01 RX ADMIN — SODIUM CHLORIDE 100 ML: 900 INJECTION INTRAVENOUS at 19:57

## 2021-07-01 RX ADMIN — LORAZEPAM 0.5 MG: 2 INJECTION INTRAMUSCULAR; INTRAVENOUS at 20:42

## 2021-07-01 ASSESSMENT — FIBROSIS 4 INDEX: FIB4 SCORE: 0.75

## 2021-07-02 NOTE — ED NOTES
Pt provided with discharge paper work and follow up instructions. Pt declines any questions at this time. Pt to ambulate out of ER.

## 2021-07-02 NOTE — ED PROVIDER NOTES
ED Provider Note    CHIEF COMPLAINT  Chief Complaint   Patient presents with   • Abdominal Pain       [facility]  Lily JOJO Nicole is a 25 y.o. female who presents with a chief complaint of epigastric pain with associated nausea and nonbloody, nonbilious emesis that started this morning.  Patient is ESRD and underwent dialysis yesterday.  She notes that she took a pill this morning and feels like the pill may have gotten stuck.  She has been able to drink liquids without any difficulty but the food that she takes and is making her vomit.  She tried Zofran and Protonix and this did help with her nausea but she has not tried any food since that time.  She is requesting morphine, Zofran, and IV Protonix.  She denies any fevers or chills, chest pain or shortness of breath, diarrhea or constipation.  She is still passing flatus.    REVIEW OF SYSTEMS  See HPI for further details.  Epigastric pain.  Nausea and vomiting.  All other systems are negative.     PAST MEDICAL HISTORY   has a past medical history of Anemia (01/17/2018), AVF (arteriovenous fistula) ([facility]), Chest pain, Chest tightness, Daytime sleepiness, Dialysis patient ([facility]), Difficulty breathing, ESRD (end stage renal disease) on dialysis ([facility]) (01/17/2018), Gasping for breath, Heart burn, Hypertension (01/17/2018), Indigestion, Lupus ([facility]), Migraines (01/17/2018), Painful breathing, Palpitations, Seizure ([facility]) (2013), Shortness of breath, Swelling of lower extremity, and Wheezing.    SOCIAL HISTORY  Social History     Tobacco Use   • Smoking status: Never Smoker   • Smokeless tobacco: Never Used   Vaping Use   • Vaping Use: Never used   Substance and Sexual Activity   • Alcohol use: No   • Drug use: No   • Sexual activity: Not on file       SURGICAL HISTORY   has a past surgical history that includes ronak by laparoscopy (4/5/2010); av fistula creation (Right); angioplasty (01/17/2018); other; other; gastroscopy-endo (12/9/2019); gastroscopy (N/A, 5/30/2020);  "gastroscopy-endo (9/18/2020); upper gi endoscopy,ctrl bleed (11/12/2020); upper gi endoscopy,biopsy (11/12/2020); gastroscopy-endo (11/12/2020); colonoscopy,diagnostic (1/8/2021); upper gi endoscopy,diagnosis (1/8/2021); upper gi endoscopy,ctrl bleed (1/8/2021); upper gi endoscopy,diagnosis (3/5/2021); upper gi endoscopy,diagnosis (N/A, 3/19/2021); upper gi endoscopy,ctrl bleed (3/19/2021); and bronchoscopy,diagnostic (Bilateral, 5/13/2021).    CURRENT MEDICATIONS  Home Medications    **Home medications have not yet been reviewed for this encounter**         ALLERGIES  Allergies   Allergen Reactions   • Cephalexin Rash     .   • Clindamycin Rash     .   • Methylprednisolone Unspecified     Anxious   • Metoprolol Rash     .   • Maxipime [Cefepime] Itching   • Norco [Hydrocodone-Acetaminophen] Nausea       PHYSICAL EXAM  VITAL SIGNS: /94   Pulse 77   Temp 36.4 °C (97.5 °F)   Resp 18   Ht 1.702 m (5' 7\")   Wt 52.6 kg (115 lb 15.4 oz)   LMP 06/07/2021 (Exact Date)   SpO2 100%   BMI 18.16 kg/m²    Pulse ox interpretation: I interpret this pulse ox as normal.  Constitutional: Alert in no apparent distress.  HENT: No signs of trauma, Bilateral external ears normal, Nose normal. Moist mucous membranes.  Eyes: Pupils are equal and reactive, Conjunctiva normal, Non-icteric.   Neck: Normal range of motion, No tenderness, Supple, No stridor.   Lymphatic: No lymphadenopathy noted.   Cardiovascular: Regular rate and rhythm, no murmurs. Pulses symmetrical.  Thorax & Lungs: Normal breath sounds, No respiratory distress, No wheezing, No chest tenderness.   Abdomen: Bowel sounds normal, Soft, right upper quadrant and epigastric tenderness, No masses, No pulsatile masses. No peritoneal signs.  Skin: Warm, Dry, No erythema, No rash.   Back: Normal alignment.  Extremities: Intact distal pulses, No edema, No tenderness, No cyanosis.  Musculoskeletal: Good range of motion in all major joints. No tenderness to palpation or " major deformities noted.   Neurologic: Alert, Normal motor function, Normal sensory function, No focal deficits noted.   Psychiatric: Affect normal, Judgment normal, Mood normal.     DIAGNOSTIC STUDIES / PROCEDURES    LABS  Results for orders placed or performed during the hospital encounter of 07/01/21   CBC WITH DIFFERENTIAL   Result Value Ref Range    WBC 3.8 (L) 4.8 - 10.8 K/uL    RBC 3.17 (L) 4.20 - 5.40 M/uL    Hemoglobin 8.7 (L) 12.0 - 16.0 g/dL    Hematocrit 29.2 (L) 37.0 - 47.0 %    MCV 92.1 81.4 - 97.8 fL    MCH 27.4 27.0 - 33.0 pg    MCHC 29.8 (L) 33.6 - 35.0 g/dL    RDW 53.6 (H) 35.9 - 50.0 fL    Platelet Count 146 (L) 164 - 446 K/uL    MPV 10.2 9.0 - 12.9 fL    Neutrophils-Polys 77.90 (H) 44.00 - 72.00 %    Lymphocytes 11.80 (L) 22.00 - 41.00 %    Monocytes 8.90 0.00 - 13.40 %    Eosinophils 0.80 0.00 - 6.90 %    Basophils 0.30 0.00 - 1.80 %    Immature Granulocytes 0.30 0.00 - 0.90 %    Nucleated RBC 0.00 /100 WBC    Neutrophils (Absolute) 2.98 2.00 - 7.15 K/uL    Lymphs (Absolute) 0.45 (L) 1.00 - 4.80 K/uL    Monos (Absolute) 0.34 0.00 - 0.85 K/uL    Eos (Absolute) 0.03 0.00 - 0.51 K/uL    Baso (Absolute) 0.01 0.00 - 0.12 K/uL    Immature Granulocytes (abs) 0.01 0.00 - 0.11 K/uL    NRBC (Absolute) 0.00 K/uL    Hypochromia 1+    COMP METABOLIC PANEL   Result Value Ref Range    Sodium 127 (L) 135 - 145 mmol/L    Potassium 4.3 3.6 - 5.5 mmol/L    Chloride 85 (L) 96 - 112 mmol/L    Co2 34 (H) 20 - 33 mmol/L    Anion Gap 8.0 7.0 - 16.0    Glucose 96 65 - 99 mg/dL    Bun 29 (H) 8 - 22 mg/dL    Creatinine 4.40 (H) 0.50 - 1.40 mg/dL    Calcium 9.1 8.4 - 10.2 mg/dL    AST(SGOT) 14 12 - 45 U/L    ALT(SGPT) 7 2 - 50 U/L    Alkaline Phosphatase 79 30 - 99 U/L    Total Bilirubin 0.4 0.1 - 1.5 mg/dL    Albumin 3.3 3.2 - 4.9 g/dL    Total Protein 7.5 6.0 - 8.2 g/dL    Globulin 4.2 (H) 1.9 - 3.5 g/dL    A-G Ratio 0.8 g/dL   LIPASE   Result Value Ref Range    Lipase 22 7 - 58 U/L   HCG QUAL SERUM   Result Value Ref  Range    Beta-Hcg Qualitative Serum Negative Negative   ESTIMATED GFR   Result Value Ref Range    GFR If  15 (A) >60 mL/min/1.73 m 2    GFR If Non  12 (A) >60 mL/min/1.73 m 2   PLATELET ESTIMATE   Result Value Ref Range    Plt Estimation Normal    MORPHOLOGY   Result Value Ref Range    RBC Morphology Present     Large Platelets 1+     Poikilocytosis 1+     Ovalocytes 2+     Basophilic Stippling Few    DIFFERENTIAL COMMENT   Result Value Ref Range    Comments-Diff see below      RADIOLOGY  CXR    COURSE & MEDICAL DECISION MAKING  Pertinent Labs & Imaging studies reviewed. (See chart for details)  Records obtained and reviewed: Patient most recently seen in this emergency department 6/15/2021 for epigastric abdominal pain which is noted to be chronic but was worse than usual and associated with nausea and vomiting.  She is on Prilosec but had run out.  She does have a history of lupus with associated nephritis and is on dialysis for ESRD.  She was treated with morphine, Zofran, Protonix and IV fluids.  Blood work was consistent with ESRD and metabolic changes but was otherwise reassuring.  She was noted to be anemic but better than baseline.  She was feeling better and was discharged to follow-up as an outpatient.    This is a 25 year old female with lupus and ESRD on M/W/F dialysis here with epigastric pain, nausea, and vomiting. Patient unfortunately is chronically ill, has a history of similar symptoms and is seen in the ER frequently. She is requesting pain and nausea medication as well as IV protonix.    Patient has a leukopenia which is improved from one week ago when she was last seen. She does have anemia with H/H of 8.7/29.2 but no reports of bleeding. Metabolic panel with multiple abnormalities including hyponatremia to 127, chloride of 85, CO2 of 34 and renal function consistent with ESRD. HCG is negative.    CXR does not reveal acute abnormality.    Patient was given the  medications she requested and upon reevaluation she is resting comfortably. She is tolerating PO. No episodes of emesis in the ED. She has dialysis tomorrow and is compliant. She is hypertensive but this does not require emergent intervention. She is improved. She is safe for discharge with close outpatient management.    Discharged in good and stable condition with strict return precautions and instructions to follow up with PMD and at dialysis tomorrow as scheduled.    The patient will return for worsening symptoms and is stable at the time of discharge. The patient verbalizes understanding and will comply.    FINAL IMPRESSION  1. Epigastric pain           Electronically signed by: Christopher Hussein M.D., 7/1/2021 7:13 PM

## 2021-07-02 NOTE — ED TRIAGE NOTES
Pt presents with family from home for epigastric abd pain that began yesterday. Has been seen for this before and dx heartburn. Took tylenol with no relief. Pt on home oxygen at 3L. Pt A&Ox4 and ambulatory.     Patient masked. No respiratory symptoms, no recent travel, denies known COVID exposure.

## 2021-07-02 NOTE — ED NOTES
continues w/o distress, states abd pain 7/10, ice chips and jello provided per erp as well as med for anxiety per pt request

## 2021-07-02 NOTE — ED NOTES
"rn to bedside-in no apparent distress . Aware of pending lab work, states is difficult sitck and requesting \"corbin for ultrasound guided\".  "

## 2021-07-02 NOTE — DISCHARGE INSTRUCTIONS
You were seen in the ER for epigastric pain, nausea, and vomiting.  Your labs and imaging did not reveal an acute abnormality that requires further work-up, consultation, or admission to the hospital.  It will be important that you follow-up with your dialysis tomorrow as scheduled.  Take all of your medications as prescribed.  Return to the ER with new or worsening symptoms.  Good luck, I hope you feel better soon!

## 2021-07-02 NOTE — ED NOTES
U/s iv placed by corbin rn-pt continues w/o apparent distress, med for 9/10 abd pain upon return from ct. Pt unable to provide urine spec due to minimal out due to dialysis

## 2021-07-06 ENCOUNTER — HOSPITAL ENCOUNTER (EMERGENCY)
Facility: MEDICAL CENTER | Age: 26
End: 2021-07-06
Attending: EMERGENCY MEDICINE
Payer: COMMERCIAL

## 2021-07-06 ENCOUNTER — APPOINTMENT (OUTPATIENT)
Dept: RADIOLOGY | Facility: MEDICAL CENTER | Age: 26
End: 2021-07-06
Attending: EMERGENCY MEDICINE
Payer: COMMERCIAL

## 2021-07-06 VITALS
DIASTOLIC BLOOD PRESSURE: 122 MMHG | RESPIRATION RATE: 18 BRPM | WEIGHT: 115 LBS | OXYGEN SATURATION: 100 % | SYSTOLIC BLOOD PRESSURE: 195 MMHG | HEIGHT: 67 IN | HEART RATE: 94 BPM | TEMPERATURE: 99.1 F | BODY MASS INDEX: 18.05 KG/M2

## 2021-07-06 DIAGNOSIS — R07.81 PLEURITIC CHEST PAIN: ICD-10-CM

## 2021-07-06 DIAGNOSIS — D64.9 CHRONIC ANEMIA: ICD-10-CM

## 2021-07-06 DIAGNOSIS — R79.89 ELEVATED TROPONIN: ICD-10-CM

## 2021-07-06 LAB
ALBUMIN SERPL BCP-MCNC: 3.5 G/DL (ref 3.2–4.9)
ALBUMIN/GLOB SERPL: 0.8 G/DL
ALP SERPL-CCNC: 105 U/L (ref 30–99)
ALT SERPL-CCNC: <5 U/L (ref 2–50)
ANION GAP SERPL CALC-SCNC: 8 MMOL/L (ref 7–16)
AST SERPL-CCNC: 13 U/L (ref 12–45)
BASOPHILS # BLD AUTO: 0.6 % (ref 0–1.8)
BASOPHILS # BLD: 0.02 K/UL (ref 0–0.12)
BILIRUB SERPL-MCNC: 0.3 MG/DL (ref 0.1–1.5)
BUN SERPL-MCNC: 20 MG/DL (ref 8–22)
CALCIUM SERPL-MCNC: 9 MG/DL (ref 8.4–10.2)
CHLORIDE SERPL-SCNC: 89 MMOL/L (ref 96–112)
CO2 SERPL-SCNC: 34 MMOL/L (ref 20–33)
CREAT SERPL-MCNC: 4.02 MG/DL (ref 0.5–1.4)
EKG IMPRESSION: NORMAL
EOSINOPHIL # BLD AUTO: 0.01 K/UL (ref 0–0.51)
EOSINOPHIL NFR BLD: 0.3 % (ref 0–6.9)
ERYTHROCYTE [DISTWIDTH] IN BLOOD BY AUTOMATED COUNT: 55 FL (ref 35.9–50)
GLOBULIN SER CALC-MCNC: 4.3 G/DL (ref 1.9–3.5)
GLUCOSE SERPL-MCNC: 94 MG/DL (ref 65–99)
HCT VFR BLD AUTO: 28.1 % (ref 37–47)
HGB BLD-MCNC: 8.4 G/DL (ref 12–16)
IMM GRANULOCYTES # BLD AUTO: 0.03 K/UL (ref 0–0.11)
IMM GRANULOCYTES NFR BLD AUTO: 0.9 % (ref 0–0.9)
LACTATE BLD-SCNC: 1.5 MMOL/L (ref 0.5–2)
LYMPHOCYTES # BLD AUTO: 0.45 K/UL (ref 1–4.8)
LYMPHOCYTES NFR BLD: 13.1 % (ref 22–41)
MCH RBC QN AUTO: 27.4 PG (ref 27–33)
MCHC RBC AUTO-ENTMCNC: 29.9 G/DL (ref 33.6–35)
MCV RBC AUTO: 91.5 FL (ref 81.4–97.8)
MONOCYTES # BLD AUTO: 0.31 K/UL (ref 0–0.85)
MONOCYTES NFR BLD AUTO: 9 % (ref 0–13.4)
NEUTROPHILS # BLD AUTO: 2.61 K/UL (ref 2–7.15)
NEUTROPHILS NFR BLD: 76.1 % (ref 44–72)
NRBC # BLD AUTO: 0 K/UL
NRBC BLD-RTO: 0 /100 WBC
PLATELET # BLD AUTO: 146 K/UL (ref 164–446)
PMV BLD AUTO: 10.1 FL (ref 9–12.9)
POTASSIUM SERPL-SCNC: 4.1 MMOL/L (ref 3.6–5.5)
PROT SERPL-MCNC: 7.8 G/DL (ref 6–8.2)
RBC # BLD AUTO: 3.07 M/UL (ref 4.2–5.4)
SODIUM SERPL-SCNC: 131 MMOL/L (ref 135–145)
TROPONIN T SERPL-MCNC: 687 NG/L (ref 6–19)
WBC # BLD AUTO: 3.4 K/UL (ref 4.8–10.8)

## 2021-07-06 PROCEDURE — 80053 COMPREHEN METABOLIC PANEL: CPT

## 2021-07-06 PROCEDURE — A9270 NON-COVERED ITEM OR SERVICE: HCPCS | Performed by: EMERGENCY MEDICINE

## 2021-07-06 PROCEDURE — 71045 X-RAY EXAM CHEST 1 VIEW: CPT

## 2021-07-06 PROCEDURE — 85025 COMPLETE CBC W/AUTO DIFF WBC: CPT

## 2021-07-06 PROCEDURE — 700111 HCHG RX REV CODE 636 W/ 250 OVERRIDE (IP): Performed by: EMERGENCY MEDICINE

## 2021-07-06 PROCEDURE — 36415 COLL VENOUS BLD VENIPUNCTURE: CPT

## 2021-07-06 PROCEDURE — 99284 EMERGENCY DEPT VISIT MOD MDM: CPT

## 2021-07-06 PROCEDURE — 700102 HCHG RX REV CODE 250 W/ 637 OVERRIDE(OP): Performed by: EMERGENCY MEDICINE

## 2021-07-06 PROCEDURE — 96376 TX/PRO/DX INJ SAME DRUG ADON: CPT

## 2021-07-06 PROCEDURE — 93005 ELECTROCARDIOGRAM TRACING: CPT | Performed by: EMERGENCY MEDICINE

## 2021-07-06 PROCEDURE — 84484 ASSAY OF TROPONIN QUANT: CPT

## 2021-07-06 PROCEDURE — 96374 THER/PROPH/DIAG INJ IV PUSH: CPT

## 2021-07-06 PROCEDURE — 96375 TX/PRO/DX INJ NEW DRUG ADDON: CPT

## 2021-07-06 PROCEDURE — 83605 ASSAY OF LACTIC ACID: CPT

## 2021-07-06 RX ORDER — LORAZEPAM 2 MG/ML
1 INJECTION INTRAMUSCULAR ONCE
Status: COMPLETED | OUTPATIENT
Start: 2021-07-06 | End: 2021-07-06

## 2021-07-06 RX ORDER — MORPHINE SULFATE 4 MG/ML
4 INJECTION, SOLUTION INTRAMUSCULAR; INTRAVENOUS
Status: DISCONTINUED | OUTPATIENT
Start: 2021-07-06 | End: 2021-07-06 | Stop reason: HOSPADM

## 2021-07-06 RX ORDER — OXYCODONE HYDROCHLORIDE AND ACETAMINOPHEN 5; 325 MG/1; MG/1
1 TABLET ORAL EVERY 4 HOURS PRN
Qty: 12 TABLET | Refills: 0 | Status: SHIPPED | OUTPATIENT
Start: 2021-07-06 | End: 2021-07-09

## 2021-07-06 RX ORDER — DIAZEPAM 5 MG/1
5 TABLET ORAL ONCE
Status: COMPLETED | OUTPATIENT
Start: 2021-07-06 | End: 2021-07-06

## 2021-07-06 RX ADMIN — MORPHINE SULFATE 4 MG: 4 INJECTION INTRAVENOUS at 04:34

## 2021-07-06 RX ADMIN — DIAZEPAM 5 MG: 5 TABLET ORAL at 05:04

## 2021-07-06 RX ADMIN — LORAZEPAM 1 MG: 2 INJECTION INTRAMUSCULAR; INTRAVENOUS at 03:19

## 2021-07-06 RX ADMIN — MORPHINE SULFATE 4 MG: 4 INJECTION INTRAVENOUS at 03:18

## 2021-07-06 ASSESSMENT — PAIN DESCRIPTION - PAIN TYPE
TYPE: ACUTE PAIN

## 2021-07-06 ASSESSMENT — PAIN DESCRIPTION - DESCRIPTORS: DESCRIPTORS: SHARP

## 2021-07-06 ASSESSMENT — FIBROSIS 4 INDEX: FIB4 SCORE: 0.83

## 2021-07-06 NOTE — ED PROVIDER NOTES
"ED Provider Note    CHIEF COMPLAINT  Chief Complaint   Patient presents with   • Shortness of Breath   • Chest Pain       HPI  Lily Nicole is a 25 y.o. female who presents for evaluation of bilateral anterior and posterior chest pain which started suddenly and woke her from sleep tonight.  She notes shortness of breath with pain on deep inspiration.  She feels \"they did not take enough water off of me\" and notes that she has had the symptoms in the past when she is fluid overloaded.  She describes no fevers or chills and no hemoptysis.  She notes she has additional pain around her costal margin circumferentially.  She notes she has chronic pain in her epigastrium which is still present today and worse when she takes deep inspiration.    REVIEW OF SYSTEMS  Constitutional: No fevers or chills  Skin: No new rashes  HEENT: No sore throat, runny nose, sores, trouble swallowing, or trouble speaking.  Neck: No neck pain, stiffness, or masses.  Chest: Fuhs anterior, posterior, and lateral chest pain  Pulm: Pain with deep inspiration.  Feels short of breath.  Gastrointestinal: No nausea, vomiting, diarrhea.  Epigastric pain noted.  Genitourinary: Makes a small amount of urine but no dysuria or hematuria noted by patient  Musculoskeletal: No pain, swelling, weakness  Neurologic:No confusion or disorientation.  Heme: Longstanding history of anemia with the need for regular transfusions.  Immuno: History of lupus      PAST MEDICAL HISTORY   has a past medical history of Anemia (01/17/2018), AVF (arteriovenous fistula) ([facility]ly [facility]), Chest pain, Chest tightness, Daytime sleepiness, Dialysis patient (Formerly [facility]), Difficulty breathing, ESRD (end stage renal disease) on dialysis ([facility]ly [facility]) (01/17/2018), Gasping for breath, Heart burn, Hypertension (01/17/2018), Indigestion, Lupus ([facility]), Migraines (01/17/2018), Painful breathing, Palpitations, Seizure ([facility]) (2013), Shortness of breath, Swelling of lower extremity, and Wheezing.    SOCIAL " "HISTORY  Social History     Tobacco Use   • Smoking status: Never Smoker   • Smokeless tobacco: Never Used   Vaping Use   • Vaping Use: Never used   Substance and Sexual Activity   • Alcohol use: No   • Drug use: No   • Sexual activity: Not on file       SURGICAL HISTORY   has a past surgical history that includes ronak by laparoscopy (4/5/2010); av fistula creation (Right); angioplasty (01/17/2018); other; other; gastroscopy-endo (12/9/2019); gastroscopy (N/A, 5/30/2020); gastroscopy-endo (9/18/2020); upper gi endoscopy,ctrl bleed (11/12/2020); upper gi endoscopy,biopsy (11/12/2020); gastroscopy-endo (11/12/2020); colonoscopy,diagnostic (1/8/2021); upper gi endoscopy,diagnosis (1/8/2021); upper gi endoscopy,ctrl bleed (1/8/2021); upper gi endoscopy,diagnosis (3/5/2021); upper gi endoscopy,diagnosis (N/A, 3/19/2021); upper gi endoscopy,ctrl bleed (3/19/2021); and bronchoscopy,diagnostic (Bilateral, 5/13/2021).    CURRENT MEDICATIONS  Home Medications    **Home medications have not yet been reviewed for this encounter**         ALLERGIES  Allergies   Allergen Reactions   • Cephalexin Rash     .   • Clindamycin Rash     .   • Methylprednisolone Unspecified     Anxious   • Metoprolol Rash     .   • Maxipime [Cefepime] Itching   • Norco [Hydrocodone-Acetaminophen] Nausea       PHYSICAL EXAM  VITAL SIGNS: BP (!) 195/122   Pulse 94   Temp 36.1 °C (97 °F) (Temporal)   Resp 16   Ht 1.702 m (5' 7\")   Wt 52.2 kg (115 lb)   LMP 06/07/2021 (Exact Date)   SpO2 100%   BMI 18.01 kg/m²    Gen: Moaning, restless, tearful  HEENT: No signs of trauma, Bilateral external ears normal, Nose normal. Conjunctiva normal, Non-icteric.   Neck:  No tenderness, Supple, No masses  Lymphatic: No cervical lymphadenopathy noted.   Cardiovascular: Regular rate and rhythm..  Capillary refill less than 3 seconds to all extremities, 2+ distal pulses.  Thorax & Lungs: Normal breath sounds, No respiratory distress, No wheezing bilateral chest " rise  Abdomen: Bowel sounds normal, Soft, mild epigastric tenderness with no guarding.  Skin: Warm, Dry, No erythema, No rash noted to exposed areas.   Back: No bony tenderness, No CVA tenderness.   Extremities: Intact distal pulses, No significant edema  Neurologic: Alert , no facial droop, grossly normal coordination and strength      LABS  Results for orders placed or performed during the hospital encounter of 07/06/21   CBC WITH DIFFERENTIAL   Result Value Ref Range    WBC 3.4 (L) 4.8 - 10.8 K/uL    RBC 3.07 (L) 4.20 - 5.40 M/uL    Hemoglobin 8.4 (L) 12.0 - 16.0 g/dL    Hematocrit 28.1 (L) 37.0 - 47.0 %    MCV 91.5 81.4 - 97.8 fL    MCH 27.4 27.0 - 33.0 pg    MCHC 29.9 (L) 33.6 - 35.0 g/dL    RDW 55.0 (H) 35.9 - 50.0 fL    Platelet Count 146 (L) 164 - 446 K/uL    MPV 10.1 9.0 - 12.9 fL    Neutrophils-Polys 76.10 (H) 44.00 - 72.00 %    Lymphocytes 13.10 (L) 22.00 - 41.00 %    Monocytes 9.00 0.00 - 13.40 %    Eosinophils 0.30 0.00 - 6.90 %    Basophils 0.60 0.00 - 1.80 %    Immature Granulocytes 0.90 0.00 - 0.90 %    Nucleated RBC 0.00 /100 WBC    Neutrophils (Absolute) 2.61 2.00 - 7.15 K/uL    Lymphs (Absolute) 0.45 (L) 1.00 - 4.80 K/uL    Monos (Absolute) 0.31 0.00 - 0.85 K/uL    Eos (Absolute) 0.01 0.00 - 0.51 K/uL    Baso (Absolute) 0.02 0.00 - 0.12 K/uL    Immature Granulocytes (abs) 0.03 0.00 - 0.11 K/uL    NRBC (Absolute) 0.00 K/uL   COMP METABOLIC PANEL   Result Value Ref Range    Sodium 131 (L) 135 - 145 mmol/L    Potassium 4.1 3.6 - 5.5 mmol/L    Chloride 89 (L) 96 - 112 mmol/L    Co2 34 (H) 20 - 33 mmol/L    Anion Gap 8.0 7.0 - 16.0    Glucose 94 65 - 99 mg/dL    Bun 20 8 - 22 mg/dL    Creatinine 4.02 (H) 0.50 - 1.40 mg/dL    Calcium 9.0 8.4 - 10.2 mg/dL    AST(SGOT) 13 12 - 45 U/L    ALT(SGPT) <5 2 - 50 U/L    Alkaline Phosphatase 105 (H) 30 - 99 U/L    Total Bilirubin 0.3 0.1 - 1.5 mg/dL    Albumin 3.5 3.2 - 4.9 g/dL    Total Protein 7.8 6.0 - 8.2 g/dL    Globulin 4.3 (H) 1.9 - 3.5 g/dL    A-G Ratio  0.8 g/dL   TROPONIN   Result Value Ref Range    Troponin T 687 (H) 6 - 19 ng/L   LACTIC ACID   Result Value Ref Range    Lactic Acid 1.5 0.5 - 2.0 mmol/L   ESTIMATED GFR   Result Value Ref Range    GFR If  17 (A) >60 mL/min/1.73 m 2    GFR If Non  14 (A) >60 mL/min/1.73 m 2   EKG   Result Value Ref Range    Report       [facility]    Test Date:  2021  Pt Name:    CASE WOODSON            Department: [facility]  MRN:        [id number]                      [facility]  Gender:     Female                       Technician:   :        [id number]                   Requested By:SVETLANA PENA  Order #:    [id number]                    Reading MD:    Measurements  Intervals                                Axis  Rate:       91                           P:          70  FL:         168                          QRS:        -9  QRSD:       88                           T:          133  QT:         356  QTc:        439    Interpretive Statements  Sinus rhythm  LVH with secondary repolarization abnormality  Compared to ECG 2021 05:22:48  Sinus tachycardia no longer present         RADIOLOGY  DX-CHEST-PORTABLE (1 VIEW)   Final Result      1.  There is no acute cardiopulmonary process.          COURSE & MEDICAL DECISION MAKING  Patient arrives for evaluation of chest pain and shortness of breath.  Her chest pain is quite atypical and that it encompasses the entirety of her upper thorax from the costal margin to neck.  She cannot localize the pain any further than this but notes a paucity of additional symptoms to suggest ACS, PE, dissection, or infectious etiology.  She is quite anxious and I do feel empirically treating her with aggressive pain and anxiolytics is reasonable.     4:15 AM  Patient reevaluated bedside, she is still very anxious appearing, moaning, and is restless.  I discussed objective findings with her including the labs  "which show her chronic appearing anemia.  This appears to be trending down in the past few weeks however given that I very much doubt ACS, I do not feel emergent transfusion or inpatient transfusion is necessary at this time.  It is notable that her troponin is elevated at 687 however giving her trends over the past year, this is actually on the low side for her.  Her chest pain is very atypical in that it encompasses the entirety of her thorax and is worse with deep inspiration.  The focus of her pain appears to be in the epigastrium when she takes a deep breath.  She does not have any hemoptysis and no hypoxemia.  She does not appear to be significantly tachycardic and once again demonstrates marked hypertension which is not new or changed for her.  At this point I very much doubt hypertensive emergency and I do not feel ACS is a factor in her presentation today.  I did consider pulmonary embolism however symptoms once again a very atypical for this and she does not have significant vital sign abnormalities.  I did consider dissection given her longstanding history of hypertension but she does not have a tearing sensation and has equal perfusion to all of her extremities both on initial and upon reevaluation arguing against this problem.  At this point CT angiography is likely more harm than benefit and the contrast alone could possibly completely take out what little is left of her kidney function.  She states understanding of this and states that she will attempt to call her dialysis center to arrange for dialysis as she is still convinced that it is \"extra water\" that is causing her pain.    FINAL IMPRESSION  1. Pleuritic chest pain    2. Chronic anemia    3. Elevated troponin        Electronically signed by: Dudley Kirk M.D., 7/6/2021 3:06 AM  "

## 2021-07-06 NOTE — ED NOTES
"Patient refused PO Valium, citing too painful to sit up. This RN offered to administer IV morphine first in order to take PO medication after. Patient refused, requesting only IV medication. Patient educated on duration of IV versus PO medication. \"It doesn't matter, I'll call dialysis this morning to see if I can do at least an hour to pull some of these fluids off.\"  MD Kirk notified.  "

## 2021-07-06 NOTE — ED NOTES
Discharge instructions & Controlled Substances Use Informed Consent form reviewed with patient. AVS & Controlled Substances Use Informed Consent form signed by patient. PIV removed. Prescription electronically sent to pharmacy of choice. Patient understands need for follow-up appointment with healthcare team and to return to ED for worsening symptoms. All questions answered at this time. Patient escorted via wheelchair to exit with belongings & visitor. Patient in stable condition. Patient agreeable to discharge instructions.

## 2021-07-06 NOTE — ED NOTES
Patient medicated for pain & anxiety. Visitor sitting at bedside. Safety precautions are in place including gurney locked and in lowest position, call light within reach.

## 2021-07-06 NOTE — ED TRIAGE NOTES
Pt here for sob and lower chest pain bilaterally today. States had dialysis yesterday and they removed 1.5L; states normally has 3L removed. Pt states pain is gradually worse and now causing SOB. No n/v/d. Dialysis M/W/F.

## 2021-07-06 NOTE — ED NOTES
Kristine from Lab called with critical result of Trop at 687. Critical lab result read back to Kristine.   Dr. Kirk notified of critical lab result at 03:38.  Critical lab result read back by Dr. Kirk.

## 2021-07-08 ENCOUNTER — OUTPATIENT INFUSION SERVICES (OUTPATIENT)
Dept: ONCOLOGY | Facility: MEDICAL CENTER | Age: 26
End: 2021-07-08
Attending: INTERNAL MEDICINE
Payer: COMMERCIAL

## 2021-07-08 VITALS
OXYGEN SATURATION: 98 % | SYSTOLIC BLOOD PRESSURE: 154 MMHG | TEMPERATURE: 98.2 F | DIASTOLIC BLOOD PRESSURE: 104 MMHG | HEART RATE: 88 BPM | RESPIRATION RATE: 18 BRPM

## 2021-07-08 DIAGNOSIS — D64.9 SYMPTOMATIC ANEMIA: ICD-10-CM

## 2021-07-08 LAB
BASOPHILS # BLD AUTO: 0.7 % (ref 0–1.8)
BASOPHILS # BLD: 0.02 K/UL (ref 0–0.12)
COMMENT 1642: NORMAL
EOSINOPHIL # BLD AUTO: 0.04 K/UL (ref 0–0.51)
EOSINOPHIL NFR BLD: 1.3 % (ref 0–6.9)
ERYTHROCYTE [DISTWIDTH] IN BLOOD BY AUTOMATED COUNT: 55 FL (ref 35.9–50)
GIANT PLATELETS BLD QL SMEAR: NORMAL
HCT VFR BLD AUTO: 27.8 % (ref 37–47)
HGB BLD-MCNC: 8.3 G/DL (ref 12–16)
HYPOCHROMIA BLD QL SMEAR: ABNORMAL
IMM GRANULOCYTES # BLD AUTO: 0.01 K/UL (ref 0–0.11)
IMM GRANULOCYTES NFR BLD AUTO: 0.3 % (ref 0–0.9)
LYMPHOCYTES # BLD AUTO: 0.39 K/UL (ref 1–4.8)
LYMPHOCYTES NFR BLD: 12.7 % (ref 22–41)
MCH RBC QN AUTO: 26.8 PG (ref 27–33)
MCHC RBC AUTO-ENTMCNC: 29.9 G/DL (ref 33.6–35)
MCV RBC AUTO: 89.7 FL (ref 81.4–97.8)
MONOCYTES # BLD AUTO: 0.25 K/UL (ref 0–0.85)
MONOCYTES NFR BLD AUTO: 8.2 % (ref 0–13.4)
MORPHOLOGY BLD-IMP: NORMAL
NEUTROPHILS # BLD AUTO: 2.35 K/UL (ref 2–7.15)
NEUTROPHILS NFR BLD: 76.8 % (ref 44–72)
NRBC # BLD AUTO: 0 K/UL
NRBC BLD-RTO: 0 /100 WBC
OVALOCYTES BLD QL SMEAR: NORMAL
PLATELET # BLD AUTO: 158 K/UL (ref 164–446)
PLATELET BLD QL SMEAR: NORMAL
PMV BLD AUTO: 10.1 FL (ref 9–12.9)
POLYCHROMASIA BLD QL SMEAR: NORMAL
RBC # BLD AUTO: 3.1 M/UL (ref 4.2–5.4)
RBC BLD AUTO: PRESENT
WBC # BLD AUTO: 3.1 K/UL (ref 4.8–10.8)

## 2021-07-08 PROCEDURE — 85025 COMPLETE CBC W/AUTO DIFF WBC: CPT

## 2021-07-08 PROCEDURE — 36415 COLL VENOUS BLD VENIPUNCTURE: CPT

## 2021-07-08 RX ORDER — ACETAMINOPHEN 325 MG/1
650 TABLET ORAL PRN
Status: CANCELLED | OUTPATIENT
Start: 2021-07-08

## 2021-07-08 RX ORDER — 0.9 % SODIUM CHLORIDE 0.9 %
3 VIAL (ML) INJECTION PRN
Status: CANCELLED | OUTPATIENT
Start: 2021-07-08

## 2021-07-08 RX ORDER — HEPARIN SODIUM (PORCINE) LOCK FLUSH IV SOLN 100 UNIT/ML 100 UNIT/ML
500 SOLUTION INTRAVENOUS PRN
Status: CANCELLED | OUTPATIENT
Start: 2021-07-08

## 2021-07-08 RX ORDER — DIPHENHYDRAMINE HCL 25 MG
25 TABLET ORAL ONCE
Status: CANCELLED | OUTPATIENT
Start: 2021-07-08 | End: 2021-07-08

## 2021-07-08 RX ORDER — 0.9 % SODIUM CHLORIDE 0.9 %
VIAL (ML) INJECTION PRN
Status: CANCELLED | OUTPATIENT
Start: 2021-07-08

## 2021-07-08 RX ORDER — 0.9 % SODIUM CHLORIDE 0.9 %
10 VIAL (ML) INJECTION PRN
Status: CANCELLED | OUTPATIENT
Start: 2021-07-08

## 2021-07-08 RX ORDER — ACETAMINOPHEN 325 MG/1
650 TABLET ORAL ONCE
Status: CANCELLED | OUTPATIENT
Start: 2021-07-08

## 2021-07-08 RX ORDER — SODIUM CHLORIDE 9 MG/ML
INJECTION, SOLUTION INTRAVENOUS CONTINUOUS
Status: CANCELLED | OUTPATIENT
Start: 2021-07-08

## 2021-07-08 RX ORDER — DIPHENHYDRAMINE HYDROCHLORIDE 50 MG/ML
25 INJECTION INTRAMUSCULAR; INTRAVENOUS PRN
Status: CANCELLED | OUTPATIENT
Start: 2021-07-08

## 2021-07-08 NOTE — PROGRESS NOTES
Pt presented to infusion center for labs. Labs drawn as ordered from left forearm with 25G butterfly needle, gauze and coban dressing placed. Has next appt, discharged home to self care.      Called Lily per pt request to discuss if she meets parameters for transfusion, Lily states understanding that she does not meet parameters and will return for next appointment on 7/22/21.

## 2021-07-10 ENCOUNTER — APPOINTMENT (OUTPATIENT)
Dept: ONCOLOGY | Facility: MEDICAL CENTER | Age: 26
End: 2021-07-10
Attending: INTERNAL MEDICINE
Payer: COMMERCIAL

## 2021-07-22 ENCOUNTER — OUTPATIENT INFUSION SERVICES (OUTPATIENT)
Dept: ONCOLOGY | Facility: MEDICAL CENTER | Age: 26
End: 2021-07-22
Attending: INTERNAL MEDICINE
Payer: COMMERCIAL

## 2021-07-22 ENCOUNTER — TELEPHONE (OUTPATIENT)
Dept: ONCOLOGY | Facility: MEDICAL CENTER | Age: 26
End: 2021-07-22

## 2021-07-22 VITALS
SYSTOLIC BLOOD PRESSURE: 138 MMHG | WEIGHT: 118.61 LBS | BODY MASS INDEX: 19.76 KG/M2 | DIASTOLIC BLOOD PRESSURE: 89 MMHG | HEIGHT: 65 IN | OXYGEN SATURATION: 99 % | RESPIRATION RATE: 18 BRPM | HEART RATE: 87 BPM | TEMPERATURE: 98.1 F

## 2021-07-22 DIAGNOSIS — D64.9 SYMPTOMATIC ANEMIA: ICD-10-CM

## 2021-07-22 LAB
ABO GROUP BLD: NORMAL
ANISOCYTOSIS BLD QL SMEAR: ABNORMAL
BASOPHILS # BLD AUTO: 1.1 % (ref 0–1.8)
BASOPHILS # BLD: 0.03 K/UL (ref 0–0.12)
BLD GP AB SCN SERPL QL: NORMAL
COMMENT 1642: NORMAL
EOSINOPHIL # BLD AUTO: 0.03 K/UL (ref 0–0.51)
EOSINOPHIL NFR BLD: 1.1 % (ref 0–6.9)
ERYTHROCYTE [DISTWIDTH] IN BLOOD BY AUTOMATED COUNT: 58.7 FL (ref 35.9–50)
HCT VFR BLD AUTO: 26.1 % (ref 37–47)
HGB BLD-MCNC: 7.4 G/DL (ref 12–16)
IMM GRANULOCYTES # BLD AUTO: 0.01 K/UL (ref 0–0.11)
IMM GRANULOCYTES NFR BLD AUTO: 0.4 % (ref 0–0.9)
LYMPHOCYTES # BLD AUTO: 0.42 K/UL (ref 1–4.8)
LYMPHOCYTES NFR BLD: 15.3 % (ref 22–41)
MACROCYTES BLD QL SMEAR: ABNORMAL
MCH RBC QN AUTO: 26.2 PG (ref 27–33)
MCHC RBC AUTO-ENTMCNC: 28.4 G/DL (ref 33.6–35)
MCV RBC AUTO: 92.6 FL (ref 81.4–97.8)
MICROCYTES BLD QL SMEAR: ABNORMAL
MONOCYTES # BLD AUTO: 0.26 K/UL (ref 0–0.85)
MONOCYTES NFR BLD AUTO: 9.5 % (ref 0–13.4)
MORPHOLOGY BLD-IMP: NORMAL
NEUTROPHILS # BLD AUTO: 1.99 K/UL (ref 2–7.15)
NEUTROPHILS NFR BLD: 72.6 % (ref 44–72)
NRBC # BLD AUTO: 0 K/UL
NRBC BLD-RTO: 0 /100 WBC
PLATELET # BLD AUTO: 194 K/UL (ref 164–446)
PLATELET BLD QL SMEAR: NORMAL
PMV BLD AUTO: 9.9 FL (ref 9–12.9)
RBC # BLD AUTO: 2.82 M/UL (ref 4.2–5.4)
RBC BLD AUTO: PRESENT
RH BLD: NORMAL
WBC # BLD AUTO: 2.7 K/UL (ref 4.8–10.8)

## 2021-07-22 PROCEDURE — 86850 RBC ANTIBODY SCREEN: CPT

## 2021-07-22 PROCEDURE — 86900 BLOOD TYPING SEROLOGIC ABO: CPT

## 2021-07-22 PROCEDURE — 85025 COMPLETE CBC W/AUTO DIFF WBC: CPT

## 2021-07-22 PROCEDURE — 36415 COLL VENOUS BLD VENIPUNCTURE: CPT

## 2021-07-22 PROCEDURE — 86901 BLOOD TYPING SEROLOGIC RH(D): CPT

## 2021-07-22 RX ORDER — DIPHENHYDRAMINE HCL 25 MG
25 TABLET ORAL ONCE
Status: CANCELLED | OUTPATIENT
Start: 2021-07-22 | End: 2021-07-22

## 2021-07-22 RX ORDER — 0.9 % SODIUM CHLORIDE 0.9 %
VIAL (ML) INJECTION PRN
Status: CANCELLED | OUTPATIENT
Start: 2021-07-22

## 2021-07-22 RX ORDER — ACETAMINOPHEN 325 MG/1
650 TABLET ORAL ONCE
Status: CANCELLED | OUTPATIENT
Start: 2021-07-22

## 2021-07-22 RX ORDER — HEPARIN SODIUM (PORCINE) LOCK FLUSH IV SOLN 100 UNIT/ML 100 UNIT/ML
500 SOLUTION INTRAVENOUS PRN
Status: CANCELLED | OUTPATIENT
Start: 2021-07-22

## 2021-07-22 RX ORDER — DIPHENHYDRAMINE HYDROCHLORIDE 50 MG/ML
25 INJECTION INTRAMUSCULAR; INTRAVENOUS PRN
Status: CANCELLED | OUTPATIENT
Start: 2021-07-22

## 2021-07-22 RX ORDER — 0.9 % SODIUM CHLORIDE 0.9 %
3 VIAL (ML) INJECTION PRN
Status: CANCELLED | OUTPATIENT
Start: 2021-07-22

## 2021-07-22 RX ORDER — 0.9 % SODIUM CHLORIDE 0.9 %
10 VIAL (ML) INJECTION PRN
Status: CANCELLED | OUTPATIENT
Start: 2021-07-22

## 2021-07-22 RX ORDER — ACETAMINOPHEN 325 MG/1
650 TABLET ORAL PRN
Status: CANCELLED | OUTPATIENT
Start: 2021-07-22

## 2021-07-22 RX ORDER — SODIUM CHLORIDE 9 MG/ML
INJECTION, SOLUTION INTRAVENOUS CONTINUOUS
Status: CANCELLED | OUTPATIENT
Start: 2021-07-22

## 2021-07-22 ASSESSMENT — FIBROSIS 4 INDEX: FIB4 SCORE: 0.89

## 2021-07-22 NOTE — TELEPHONE ENCOUNTER
Pt is aware of appt scheduled on 7/24 and to see [facility] on 7/27 at 11:15 prior to having further transfusions.

## 2021-07-22 NOTE — PROGRESS NOTES
arrived to Infusion Services for CBC / COD, lab draw today. 25 x 3/4 G needle used to draw blood, from LAC, sent to lab for analysis. Lily tolerated well. Pressure dressing held to site.  Future appointments confirmed prior to Pt's departure, left department appearing in good spirits and NAD. Patient called by Anne Cardoso RN to confirm Saturday blood transfusion.

## 2021-07-23 PROCEDURE — 86902 BLOOD TYPE ANTIGEN DONOR EA: CPT | Mod: 91

## 2021-07-24 ENCOUNTER — OUTPATIENT INFUSION SERVICES (OUTPATIENT)
Dept: ONCOLOGY | Facility: MEDICAL CENTER | Age: 26
End: 2021-07-24
Attending: INTERNAL MEDICINE
Payer: COMMERCIAL

## 2021-07-24 VITALS
DIASTOLIC BLOOD PRESSURE: 99 MMHG | HEIGHT: 66 IN | TEMPERATURE: 98.1 F | BODY MASS INDEX: 18.28 KG/M2 | RESPIRATION RATE: 12 BRPM | HEART RATE: 63 BPM | SYSTOLIC BLOOD PRESSURE: 163 MMHG | WEIGHT: 113.76 LBS | OXYGEN SATURATION: 100 %

## 2021-07-24 DIAGNOSIS — D64.9 SYMPTOMATIC ANEMIA: ICD-10-CM

## 2021-07-24 PROCEDURE — P9016 RBC LEUKOCYTES REDUCED: HCPCS

## 2021-07-24 PROCEDURE — 86945 BLOOD PRODUCT/IRRADIATION: CPT

## 2021-07-24 PROCEDURE — 700105 HCHG RX REV CODE 258: Performed by: INTERNAL MEDICINE

## 2021-07-24 PROCEDURE — A9270 NON-COVERED ITEM OR SERVICE: HCPCS | Performed by: INTERNAL MEDICINE

## 2021-07-24 PROCEDURE — 86922 COMPATIBILITY TEST ANTIGLOB: CPT

## 2021-07-24 PROCEDURE — 306780 HCHG STAT FOR TRANSFUSION PER CASE

## 2021-07-24 PROCEDURE — 96374 THER/PROPH/DIAG INJ IV PUSH: CPT

## 2021-07-24 PROCEDURE — 700111 HCHG RX REV CODE 636 W/ 250 OVERRIDE (IP): Performed by: INTERNAL MEDICINE

## 2021-07-24 PROCEDURE — 700102 HCHG RX REV CODE 250 W/ 637 OVERRIDE(OP): Performed by: INTERNAL MEDICINE

## 2021-07-24 PROCEDURE — 36430 TRANSFUSION BLD/BLD COMPNT: CPT

## 2021-07-24 RX ORDER — DIPHENHYDRAMINE HYDROCHLORIDE 50 MG/ML
25 INJECTION INTRAMUSCULAR; INTRAVENOUS PRN
Status: CANCELLED | OUTPATIENT
Start: 2021-07-24

## 2021-07-24 RX ORDER — ACETAMINOPHEN 325 MG/1
650 TABLET ORAL PRN
Status: CANCELLED | OUTPATIENT
Start: 2021-07-24

## 2021-07-24 RX ORDER — 0.9 % SODIUM CHLORIDE 0.9 %
3 VIAL (ML) INJECTION PRN
Status: CANCELLED | OUTPATIENT
Start: 2021-07-24

## 2021-07-24 RX ORDER — SODIUM CHLORIDE 9 MG/ML
INJECTION, SOLUTION INTRAVENOUS CONTINUOUS
Status: DISCONTINUED | OUTPATIENT
Start: 2021-07-24 | End: 2021-07-24 | Stop reason: HOSPADM

## 2021-07-24 RX ORDER — ACETAMINOPHEN 325 MG/1
650 TABLET ORAL ONCE
Status: COMPLETED | OUTPATIENT
Start: 2021-07-24 | End: 2021-07-24

## 2021-07-24 RX ORDER — 0.9 % SODIUM CHLORIDE 0.9 %
10 VIAL (ML) INJECTION PRN
Status: CANCELLED | OUTPATIENT
Start: 2021-07-24

## 2021-07-24 RX ORDER — HEPARIN SODIUM (PORCINE) LOCK FLUSH IV SOLN 100 UNIT/ML 100 UNIT/ML
500 SOLUTION INTRAVENOUS PRN
Status: CANCELLED | OUTPATIENT
Start: 2021-07-24

## 2021-07-24 RX ORDER — DIPHENHYDRAMINE HCL 25 MG
25 TABLET ORAL ONCE
Status: CANCELLED | OUTPATIENT
Start: 2021-07-24 | End: 2021-07-24

## 2021-07-24 RX ORDER — 0.9 % SODIUM CHLORIDE 0.9 %
VIAL (ML) INJECTION PRN
Status: CANCELLED | OUTPATIENT
Start: 2021-07-24

## 2021-07-24 RX ORDER — SODIUM CHLORIDE 9 MG/ML
INJECTION, SOLUTION INTRAVENOUS CONTINUOUS
Status: CANCELLED | OUTPATIENT
Start: 2021-07-24

## 2021-07-24 RX ORDER — ACETAMINOPHEN 325 MG/1
650 TABLET ORAL ONCE
Status: CANCELLED | OUTPATIENT
Start: 2021-07-24

## 2021-07-24 RX ORDER — DIPHENHYDRAMINE HYDROCHLORIDE 50 MG/ML
25 INJECTION INTRAMUSCULAR; INTRAVENOUS PRN
Status: COMPLETED | OUTPATIENT
Start: 2021-07-24 | End: 2021-07-24

## 2021-07-24 RX ADMIN — SODIUM CHLORIDE: 9 INJECTION, SOLUTION INTRAVENOUS at 11:48

## 2021-07-24 RX ADMIN — ACETAMINOPHEN 650 MG: 325 TABLET ORAL at 11:47

## 2021-07-24 RX ADMIN — DIPHENHYDRAMINE HYDROCHLORIDE 25 MG: 50 INJECTION INTRAMUSCULAR; INTRAVENOUS at 11:51

## 2021-07-24 ASSESSMENT — FIBROSIS 4 INDEX: FIB4 SCORE: 0.73

## 2021-07-24 NOTE — PROGRESS NOTES
Patient presented to [facility] for blood transfusion. On 7/22/21 her Hgb was 7.4, meeting parameters for 1 unit PRBC. Patient requested Benadryl pre-med to be IV, not PO.  Blood transfused as ordered, tolerated well. PIV removed, catheter tip remained intact. Gauze and coban dressing to site. Patient left [facility] in no apparent distress.

## 2021-08-04 ENCOUNTER — HOSPITAL ENCOUNTER (INPATIENT)
Facility: MEDICAL CENTER | Age: 26
LOS: 1 days | DRG: 683 | End: 2021-08-05
Attending: EMERGENCY MEDICINE | Admitting: INTERNAL MEDICINE
Payer: COMMERCIAL

## 2021-08-04 DIAGNOSIS — R55 SYNCOPE, UNSPECIFIED SYNCOPE TYPE: ICD-10-CM

## 2021-08-04 DIAGNOSIS — D64.9 SYMPTOMATIC ANEMIA: ICD-10-CM

## 2021-08-04 LAB
ABO GROUP BLD: NORMAL
ALBUMIN SERPL BCP-MCNC: 3.5 G/DL (ref 3.2–4.9)
ALBUMIN/GLOB SERPL: 0.9 G/DL
ALP SERPL-CCNC: 83 U/L (ref 30–99)
ALT SERPL-CCNC: 7 U/L (ref 2–50)
ANION GAP SERPL CALC-SCNC: 6 MMOL/L (ref 7–16)
ANISOCYTOSIS BLD QL SMEAR: ABNORMAL
APTT PPP: 30 SEC (ref 24.7–36)
AST SERPL-CCNC: 16 U/L (ref 12–45)
BARCODED ABORH UBTYP: 5100
BARCODED ABORH UBTYP: 5100
BARCODED PRD CODE UBPRD: NORMAL
BARCODED PRD CODE UBPRD: NORMAL
BARCODED UNIT NUM UBUNT: NORMAL
BARCODED UNIT NUM UBUNT: NORMAL
BASOPHILS # BLD AUTO: 0.3 % (ref 0–1.8)
BASOPHILS # BLD: 0.01 K/UL (ref 0–0.12)
BILIRUB SERPL-MCNC: 0.3 MG/DL (ref 0.1–1.5)
BLD GP AB SCN SERPL QL: NORMAL
BUN SERPL-MCNC: 12 MG/DL (ref 8–22)
CALCIUM SERPL-MCNC: 9 MG/DL (ref 8.4–10.2)
CHLORIDE SERPL-SCNC: 90 MMOL/L (ref 96–112)
CO2 SERPL-SCNC: 39 MMOL/L (ref 20–33)
COMMENT 1642: NORMAL
COMPONENT R 8504R: NORMAL
COMPONENT R 8504R: NORMAL
CREAT SERPL-MCNC: 2.93 MG/DL (ref 0.5–1.4)
EKG IMPRESSION: NORMAL
EOSINOPHIL # BLD AUTO: 0.01 K/UL (ref 0–0.51)
EOSINOPHIL NFR BLD: 0.3 % (ref 0–6.9)
ERYTHROCYTE [DISTWIDTH] IN BLOOD BY AUTOMATED COUNT: 61.7 FL (ref 35.9–50)
GLOBULIN SER CALC-MCNC: 3.9 G/DL (ref 1.9–3.5)
GLUCOSE SERPL-MCNC: 95 MG/DL (ref 65–99)
HCT VFR BLD AUTO: 18.5 % (ref 37–47)
HGB BLD-MCNC: 5.3 G/DL (ref 12–16)
HYPOCHROMIA BLD QL SMEAR: ABNORMAL
IMM GRANULOCYTES # BLD AUTO: 0.01 K/UL (ref 0–0.11)
IMM GRANULOCYTES NFR BLD AUTO: 0.3 % (ref 0–0.9)
INR PPP: 1.14 (ref 0.87–1.13)
LIPASE SERPL-CCNC: 31 U/L (ref 7–58)
LYMPHOCYTES # BLD AUTO: 0.35 K/UL (ref 1–4.8)
LYMPHOCYTES NFR BLD: 12 % (ref 22–41)
MACROCYTES BLD QL SMEAR: ABNORMAL
MAGNESIUM SERPL-MCNC: 1.6 MG/DL (ref 1.5–2.5)
MCH RBC QN AUTO: 26.8 PG (ref 27–33)
MCHC RBC AUTO-ENTMCNC: 28.6 G/DL (ref 33.6–35)
MCV RBC AUTO: 93.4 FL (ref 81.4–97.8)
MICROCYTES BLD QL SMEAR: ABNORMAL
MONOCYTES # BLD AUTO: 0.22 K/UL (ref 0–0.85)
MONOCYTES NFR BLD AUTO: 7.5 % (ref 0–13.4)
NEUTROPHILS # BLD AUTO: 2.32 K/UL (ref 2–7.15)
NEUTROPHILS NFR BLD: 79.6 % (ref 44–72)
NRBC # BLD AUTO: 0 K/UL
NRBC BLD-RTO: 0 /100 WBC
OVALOCYTES BLD QL SMEAR: NORMAL
PLATELET # BLD AUTO: 213 K/UL (ref 164–446)
PLATELET BLD QL SMEAR: NORMAL
PMV BLD AUTO: 9.3 FL (ref 9–12.9)
POIKILOCYTOSIS BLD QL SMEAR: NORMAL
POLYCHROMASIA BLD QL SMEAR: NORMAL
POTASSIUM SERPL-SCNC: 3.5 MMOL/L (ref 3.6–5.5)
PRODUCT TYPE UPROD: NORMAL
PRODUCT TYPE UPROD: NORMAL
PROT SERPL-MCNC: 7.4 G/DL (ref 6–8.2)
PROTHROMBIN TIME: 13.7 SEC (ref 12–14.6)
RBC # BLD AUTO: 1.98 M/UL (ref 4.2–5.4)
RBC BLD AUTO: PRESENT
RH BLD: NORMAL
SODIUM SERPL-SCNC: 135 MMOL/L (ref 135–145)
TSH SERPL DL<=0.005 MIU/L-ACNC: 6.62 UIU/ML (ref 0.38–5.33)
UNIT STATUS USTAT: NORMAL
UNIT STATUS USTAT: NORMAL
WBC # BLD AUTO: 2.9 K/UL (ref 4.8–10.8)

## 2021-08-04 PROCEDURE — 96374 THER/PROPH/DIAG INJ IV PUSH: CPT

## 2021-08-04 PROCEDURE — 86902 BLOOD TYPE ANTIGEN DONOR EA: CPT

## 2021-08-04 PROCEDURE — 93005 ELECTROCARDIOGRAM TRACING: CPT | Performed by: EMERGENCY MEDICINE

## 2021-08-04 PROCEDURE — 80053 COMPREHEN METABOLIC PANEL: CPT

## 2021-08-04 PROCEDURE — 86900 BLOOD TYPING SEROLOGIC ABO: CPT

## 2021-08-04 PROCEDURE — 30233N1 TRANSFUSION OF NONAUTOLOGOUS RED BLOOD CELLS INTO PERIPHERAL VEIN, PERCUTANEOUS APPROACH: ICD-10-PCS | Performed by: EMERGENCY MEDICINE

## 2021-08-04 PROCEDURE — 85730 THROMBOPLASTIN TIME PARTIAL: CPT

## 2021-08-04 PROCEDURE — 83735 ASSAY OF MAGNESIUM: CPT

## 2021-08-04 PROCEDURE — 84443 ASSAY THYROID STIM HORMONE: CPT

## 2021-08-04 PROCEDURE — 700111 HCHG RX REV CODE 636 W/ 250 OVERRIDE (IP): Performed by: EMERGENCY MEDICINE

## 2021-08-04 PROCEDURE — 86901 BLOOD TYPING SEROLOGIC RH(D): CPT

## 2021-08-04 PROCEDURE — 83690 ASSAY OF LIPASE: CPT

## 2021-08-04 PROCEDURE — 86922 COMPATIBILITY TEST ANTIGLOB: CPT | Mod: 91

## 2021-08-04 PROCEDURE — 85025 COMPLETE CBC W/AUTO DIFF WBC: CPT

## 2021-08-04 PROCEDURE — 96375 TX/PRO/DX INJ NEW DRUG ADDON: CPT

## 2021-08-04 PROCEDURE — 99285 EMERGENCY DEPT VISIT HI MDM: CPT

## 2021-08-04 PROCEDURE — 85610 PROTHROMBIN TIME: CPT

## 2021-08-04 PROCEDURE — 86850 RBC ANTIBODY SCREEN: CPT

## 2021-08-04 RX ORDER — MORPHINE SULFATE 10 MG/ML
6 INJECTION, SOLUTION INTRAMUSCULAR; INTRAVENOUS ONCE
Status: COMPLETED | OUTPATIENT
Start: 2021-08-04 | End: 2021-08-04

## 2021-08-04 RX ORDER — DIPHENHYDRAMINE HYDROCHLORIDE 50 MG/ML
25 INJECTION INTRAMUSCULAR; INTRAVENOUS ONCE
Status: COMPLETED | OUTPATIENT
Start: 2021-08-04 | End: 2021-08-05

## 2021-08-04 RX ORDER — PANTOPRAZOLE SODIUM 40 MG/10ML
40 INJECTION, POWDER, LYOPHILIZED, FOR SOLUTION INTRAVENOUS ONCE
Status: COMPLETED | OUTPATIENT
Start: 2021-08-04 | End: 2021-08-05

## 2021-08-04 RX ORDER — ACETAMINOPHEN 325 MG/1
650 TABLET ORAL ONCE
Status: COMPLETED | OUTPATIENT
Start: 2021-08-04 | End: 2021-08-05

## 2021-08-04 RX ORDER — ONDANSETRON 2 MG/ML
4 INJECTION INTRAMUSCULAR; INTRAVENOUS ONCE
Status: COMPLETED | OUTPATIENT
Start: 2021-08-04 | End: 2021-08-04

## 2021-08-04 RX ADMIN — ONDANSETRON 4 MG: 2 INJECTION INTRAMUSCULAR; INTRAVENOUS at 23:16

## 2021-08-04 RX ADMIN — MORPHINE SULFATE 6 MG: 10 INJECTION INTRAVENOUS at 23:21

## 2021-08-04 ASSESSMENT — FIBROSIS 4 INDEX: FIB4 SCORE: 0.73

## 2021-08-05 ENCOUNTER — PATIENT OUTREACH (OUTPATIENT)
Dept: HEALTH INFORMATION MANAGEMENT | Facility: OTHER | Age: 26
End: 2021-08-05

## 2021-08-05 ENCOUNTER — APPOINTMENT (OUTPATIENT)
Dept: ONCOLOGY | Facility: MEDICAL CENTER | Age: 26
End: 2021-08-05
Attending: INTERNAL MEDICINE
Payer: COMMERCIAL

## 2021-08-05 VITALS
HEART RATE: 67 BPM | BODY MASS INDEX: 18 KG/M2 | SYSTOLIC BLOOD PRESSURE: 136 MMHG | RESPIRATION RATE: 16 BRPM | WEIGHT: 112 LBS | DIASTOLIC BLOOD PRESSURE: 94 MMHG | TEMPERATURE: 97.8 F | OXYGEN SATURATION: 100 % | HEIGHT: 66 IN

## 2021-08-05 PROBLEM — R55 SYNCOPE: Status: ACTIVE | Noted: 2021-08-05

## 2021-08-05 PROBLEM — E87.6 HYPOKALEMIA: Status: ACTIVE | Noted: 2021-08-05

## 2021-08-05 LAB
ANION GAP SERPL CALC-SCNC: 5 MMOL/L (ref 7–16)
BUN SERPL-MCNC: 16 MG/DL (ref 8–22)
CALCIUM SERPL-MCNC: 8.7 MG/DL (ref 8.4–10.2)
CHLORIDE SERPL-SCNC: 93 MMOL/L (ref 96–112)
CO2 SERPL-SCNC: 38 MMOL/L (ref 20–33)
CREAT SERPL-MCNC: 3.73 MG/DL (ref 0.5–1.4)
ERYTHROCYTE [DISTWIDTH] IN BLOOD BY AUTOMATED COUNT: 57.9 FL (ref 35.9–50)
GLUCOSE SERPL-MCNC: 83 MG/DL (ref 65–99)
HCT VFR BLD AUTO: 20.2 % (ref 37–47)
HGB BLD-MCNC: 6.1 G/DL (ref 12–16)
MCH RBC QN AUTO: 27.9 PG (ref 27–33)
MCHC RBC AUTO-ENTMCNC: 30.2 G/DL (ref 33.6–35)
MCV RBC AUTO: 92.2 FL (ref 81.4–97.8)
PLATELET # BLD AUTO: 178 K/UL (ref 164–446)
PMV BLD AUTO: 9.9 FL (ref 9–12.9)
POTASSIUM SERPL-SCNC: 3.9 MMOL/L (ref 3.6–5.5)
RBC # BLD AUTO: 2.19 M/UL (ref 4.2–5.4)
SODIUM SERPL-SCNC: 136 MMOL/L (ref 135–145)
WBC # BLD AUTO: 3.2 K/UL (ref 4.8–10.8)

## 2021-08-05 PROCEDURE — 86902 BLOOD TYPE ANTIGEN DONOR EA: CPT

## 2021-08-05 PROCEDURE — 36430 TRANSFUSION BLD/BLD COMPNT: CPT

## 2021-08-05 PROCEDURE — A9270 NON-COVERED ITEM OR SERVICE: HCPCS | Performed by: FAMILY MEDICINE

## 2021-08-05 PROCEDURE — 5A1D70Z PERFORMANCE OF URINARY FILTRATION, INTERMITTENT, LESS THAN 6 HOURS PER DAY: ICD-10-PCS | Performed by: INTERNAL MEDICINE

## 2021-08-05 PROCEDURE — 700102 HCHG RX REV CODE 250 W/ 637 OVERRIDE(OP): Performed by: EMERGENCY MEDICINE

## 2021-08-05 PROCEDURE — A9270 NON-COVERED ITEM OR SERVICE: HCPCS | Performed by: INTERNAL MEDICINE

## 2021-08-05 PROCEDURE — 770006 HCHG ROOM/CARE - MED/SURG/GYN SEMI*

## 2021-08-05 PROCEDURE — 700111 HCHG RX REV CODE 636 W/ 250 OVERRIDE (IP): Performed by: INTERNAL MEDICINE

## 2021-08-05 PROCEDURE — 85027 COMPLETE CBC AUTOMATED: CPT

## 2021-08-05 PROCEDURE — 86945 BLOOD PRODUCT/IRRADIATION: CPT | Mod: 91

## 2021-08-05 PROCEDURE — 36415 COLL VENOUS BLD VENIPUNCTURE: CPT

## 2021-08-05 PROCEDURE — 94760 N-INVAS EAR/PLS OXIMETRY 1: CPT

## 2021-08-05 PROCEDURE — 700102 HCHG RX REV CODE 250 W/ 637 OVERRIDE(OP): Performed by: FAMILY MEDICINE

## 2021-08-05 PROCEDURE — 90935 HEMODIALYSIS ONE EVALUATION: CPT

## 2021-08-05 PROCEDURE — 99236 HOSP IP/OBS SAME DATE HI 85: CPT | Performed by: INTERNAL MEDICINE

## 2021-08-05 PROCEDURE — C9113 INJ PANTOPRAZOLE SODIUM, VIA: HCPCS | Performed by: EMERGENCY MEDICINE

## 2021-08-05 PROCEDURE — A9270 NON-COVERED ITEM OR SERVICE: HCPCS | Performed by: EMERGENCY MEDICINE

## 2021-08-05 PROCEDURE — 80048 BASIC METABOLIC PNL TOTAL CA: CPT

## 2021-08-05 PROCEDURE — 86922 COMPATIBILITY TEST ANTIGLOB: CPT | Mod: 91

## 2021-08-05 PROCEDURE — 700111 HCHG RX REV CODE 636 W/ 250 OVERRIDE (IP): Performed by: EMERGENCY MEDICINE

## 2021-08-05 PROCEDURE — 700102 HCHG RX REV CODE 250 W/ 637 OVERRIDE(OP): Performed by: INTERNAL MEDICINE

## 2021-08-05 PROCEDURE — 99254 IP/OBS CNSLTJ NEW/EST MOD 60: CPT | Performed by: INTERNAL MEDICINE

## 2021-08-05 PROCEDURE — 96375 TX/PRO/DX INJ NEW DRUG ADDON: CPT

## 2021-08-05 PROCEDURE — P9016 RBC LEUKOCYTES REDUCED: HCPCS

## 2021-08-05 RX ORDER — LABETALOL HYDROCHLORIDE 5 MG/ML
10 INJECTION, SOLUTION INTRAVENOUS EVERY 4 HOURS PRN
Status: DISCONTINUED | OUTPATIENT
Start: 2021-08-05 | End: 2021-08-05 | Stop reason: HOSPADM

## 2021-08-05 RX ORDER — SUCRALFATE 1 G/1
1 TABLET ORAL
Qty: 120 TABLET | Refills: 0 | Status: SHIPPED | OUTPATIENT
Start: 2021-08-05 | End: 2021-08-26

## 2021-08-05 RX ORDER — PROMETHAZINE HYDROCHLORIDE 25 MG/1
12.5-25 TABLET ORAL EVERY 4 HOURS PRN
Status: DISCONTINUED | OUTPATIENT
Start: 2021-08-05 | End: 2021-08-05 | Stop reason: HOSPADM

## 2021-08-05 RX ORDER — SUCRALFATE ORAL 1 G/10ML
1 SUSPENSION ORAL
Status: DISCONTINUED | OUTPATIENT
Start: 2021-08-05 | End: 2021-08-05 | Stop reason: HOSPADM

## 2021-08-05 RX ORDER — ONDANSETRON 4 MG/1
4 TABLET, ORALLY DISINTEGRATING ORAL EVERY 4 HOURS PRN
Status: DISCONTINUED | OUTPATIENT
Start: 2021-08-05 | End: 2021-08-05 | Stop reason: HOSPADM

## 2021-08-05 RX ORDER — ENALAPRILAT 1.25 MG/ML
1.25 INJECTION INTRAVENOUS EVERY 6 HOURS PRN
Status: DISCONTINUED | OUTPATIENT
Start: 2021-08-05 | End: 2021-08-05 | Stop reason: HOSPADM

## 2021-08-05 RX ORDER — AMLODIPINE BESYLATE 5 MG/1
10 TABLET ORAL EVERY EVENING
Status: DISCONTINUED | OUTPATIENT
Start: 2021-08-05 | End: 2021-08-05 | Stop reason: HOSPADM

## 2021-08-05 RX ORDER — AMOXICILLIN 250 MG
2 CAPSULE ORAL 2 TIMES DAILY
Status: DISCONTINUED | OUTPATIENT
Start: 2021-08-05 | End: 2021-08-05 | Stop reason: HOSPADM

## 2021-08-05 RX ORDER — HYDROXYCHLOROQUINE SULFATE 200 MG/1
200 TABLET, FILM COATED ORAL EVERY EVENING
Status: DISCONTINUED | OUTPATIENT
Start: 2021-08-05 | End: 2021-08-05 | Stop reason: HOSPADM

## 2021-08-05 RX ORDER — DIPHENHYDRAMINE HYDROCHLORIDE 50 MG/ML
25 INJECTION INTRAMUSCULAR; INTRAVENOUS ONCE
Status: COMPLETED | OUTPATIENT
Start: 2021-08-05 | End: 2021-08-05

## 2021-08-05 RX ORDER — MYCOPHENOLATE MOFETIL 250 MG/1
250 CAPSULE ORAL EVERY EVENING
Status: DISCONTINUED | OUTPATIENT
Start: 2021-08-05 | End: 2021-08-05 | Stop reason: HOSPADM

## 2021-08-05 RX ORDER — BISACODYL 10 MG
10 SUPPOSITORY, RECTAL RECTAL
Status: DISCONTINUED | OUTPATIENT
Start: 2021-08-05 | End: 2021-08-05 | Stop reason: HOSPADM

## 2021-08-05 RX ORDER — OMEPRAZOLE 20 MG/1
20 CAPSULE, DELAYED RELEASE ORAL 2 TIMES DAILY
Status: DISCONTINUED | OUTPATIENT
Start: 2021-08-05 | End: 2021-08-05 | Stop reason: HOSPADM

## 2021-08-05 RX ORDER — CLONIDINE 0.2 MG/24H
1 PATCH, EXTENDED RELEASE TRANSDERMAL
Status: DISCONTINUED | OUTPATIENT
Start: 2021-08-05 | End: 2021-08-05 | Stop reason: HOSPADM

## 2021-08-05 RX ORDER — CLONIDINE HYDROCHLORIDE 0.1 MG/1
0.1 TABLET ORAL EVERY 6 HOURS PRN
Status: DISCONTINUED | OUTPATIENT
Start: 2021-08-05 | End: 2021-08-05 | Stop reason: HOSPADM

## 2021-08-05 RX ORDER — POLYETHYLENE GLYCOL 3350 17 G/17G
1 POWDER, FOR SOLUTION ORAL
Status: DISCONTINUED | OUTPATIENT
Start: 2021-08-05 | End: 2021-08-05 | Stop reason: HOSPADM

## 2021-08-05 RX ORDER — CARVEDILOL 6.25 MG/1
25 TABLET ORAL 2 TIMES DAILY WITH MEALS
Status: DISCONTINUED | OUTPATIENT
Start: 2021-08-05 | End: 2021-08-05 | Stop reason: HOSPADM

## 2021-08-05 RX ORDER — PREDNISONE 10 MG/1
5 TABLET ORAL DAILY
Status: DISCONTINUED | OUTPATIENT
Start: 2021-08-05 | End: 2021-08-05 | Stop reason: HOSPADM

## 2021-08-05 RX ORDER — ACETAMINOPHEN 325 MG/1
650 TABLET ORAL EVERY 6 HOURS PRN
Status: DISCONTINUED | OUTPATIENT
Start: 2021-08-05 | End: 2021-08-05 | Stop reason: HOSPADM

## 2021-08-05 RX ORDER — ONDANSETRON 2 MG/ML
4 INJECTION INTRAMUSCULAR; INTRAVENOUS EVERY 4 HOURS PRN
Status: DISCONTINUED | OUTPATIENT
Start: 2021-08-05 | End: 2021-08-05 | Stop reason: HOSPADM

## 2021-08-05 RX ORDER — LOSARTAN POTASSIUM 25 MG/1
25 TABLET ORAL DAILY
Status: DISCONTINUED | OUTPATIENT
Start: 2021-08-05 | End: 2021-08-05 | Stop reason: HOSPADM

## 2021-08-05 RX ORDER — PROCHLORPERAZINE EDISYLATE 5 MG/ML
5-10 INJECTION INTRAMUSCULAR; INTRAVENOUS EVERY 4 HOURS PRN
Status: DISCONTINUED | OUTPATIENT
Start: 2021-08-05 | End: 2021-08-05 | Stop reason: HOSPADM

## 2021-08-05 RX ORDER — PROMETHAZINE HYDROCHLORIDE 25 MG/1
12.5-25 SUPPOSITORY RECTAL EVERY 4 HOURS PRN
Status: DISCONTINUED | OUTPATIENT
Start: 2021-08-05 | End: 2021-08-05 | Stop reason: HOSPADM

## 2021-08-05 RX ORDER — ONDANSETRON 4 MG/1
4 TABLET, ORALLY DISINTEGRATING ORAL EVERY 4 HOURS PRN
Qty: 30 TABLET | Refills: 0 | Status: ON HOLD | OUTPATIENT
Start: 2021-08-05 | End: 2021-12-20

## 2021-08-05 RX ORDER — LORAZEPAM 0.5 MG/1
0.5 TABLET ORAL EVERY 6 HOURS PRN
Status: DISCONTINUED | OUTPATIENT
Start: 2021-08-05 | End: 2021-08-05 | Stop reason: HOSPADM

## 2021-08-05 RX ORDER — MYCOPHENOLIC ACID 180 MG/1
180 TABLET, DELAYED RELEASE ORAL EVERY EVENING
Status: DISCONTINUED | OUTPATIENT
Start: 2021-08-05 | End: 2021-08-05

## 2021-08-05 RX ORDER — FUROSEMIDE 40 MG/1
240 TABLET ORAL 2 TIMES DAILY
Status: DISCONTINUED | OUTPATIENT
Start: 2021-08-05 | End: 2021-08-05 | Stop reason: HOSPADM

## 2021-08-05 RX ORDER — MORPHINE SULFATE 4 MG/ML
4 INJECTION, SOLUTION INTRAMUSCULAR; INTRAVENOUS ONCE
Status: COMPLETED | OUTPATIENT
Start: 2021-08-05 | End: 2021-08-05

## 2021-08-05 RX ADMIN — DIPHENHYDRAMINE HYDROCHLORIDE 25 MG: 50 INJECTION INTRAMUSCULAR; INTRAVENOUS at 03:11

## 2021-08-05 RX ADMIN — DIPHENHYDRAMINE HYDROCHLORIDE 25 MG: 50 INJECTION INTRAMUSCULAR; INTRAVENOUS at 13:00

## 2021-08-05 RX ADMIN — ACETAMINOPHEN 650 MG: 325 TABLET, FILM COATED ORAL at 00:08

## 2021-08-05 RX ADMIN — ONDANSETRON 4 MG: 2 INJECTION INTRAMUSCULAR; INTRAVENOUS at 06:43

## 2021-08-05 RX ADMIN — PREDNISONE 5 MG: 10 TABLET ORAL at 06:36

## 2021-08-05 RX ADMIN — OMEPRAZOLE 20 MG: 20 CAPSULE, DELAYED RELEASE ORAL at 06:33

## 2021-08-05 RX ADMIN — MORPHINE SULFATE 4 MG: 4 INJECTION INTRAVENOUS at 07:01

## 2021-08-05 RX ADMIN — PANTOPRAZOLE SODIUM 40 MG: 40 INJECTION, POWDER, LYOPHILIZED, FOR SOLUTION INTRAVENOUS at 00:08

## 2021-08-05 RX ADMIN — FUROSEMIDE 240 MG: 40 TABLET ORAL at 06:33

## 2021-08-05 RX ADMIN — LOSARTAN POTASSIUM 25 MG: 25 TABLET, FILM COATED ORAL at 06:34

## 2021-08-05 RX ADMIN — CARVEDILOL 25 MG: 6.25 TABLET, FILM COATED ORAL at 07:57

## 2021-08-05 RX ADMIN — LORAZEPAM 0.5 MG: 0.5 TABLET ORAL at 11:50

## 2021-08-05 ASSESSMENT — COGNITIVE AND FUNCTIONAL STATUS - GENERAL
DAILY ACTIVITIY SCORE: 24
SUGGESTED CMS G CODE MODIFIER DAILY ACTIVITY: CH
SUGGESTED CMS G CODE MODIFIER MOBILITY: CH
MOBILITY SCORE: 24

## 2021-08-05 ASSESSMENT — LIFESTYLE VARIABLES
TOTAL SCORE: 0
EVER HAD A DRINK FIRST THING IN THE MORNING TO STEADY YOUR NERVES TO GET RID OF A HANGOVER: NO
CONSUMPTION TOTAL: NEGATIVE
ON A TYPICAL DAY WHEN YOU DRINK ALCOHOL HOW MANY DRINKS DO YOU HAVE: 0
ALCOHOL_USE: NO
HOW MANY TIMES IN THE PAST YEAR HAVE YOU HAD 5 OR MORE DRINKS IN A DAY: 0
TOTAL SCORE: 0
AVERAGE NUMBER OF DAYS PER WEEK YOU HAVE A DRINK CONTAINING ALCOHOL: 0
EVER FELT BAD OR GUILTY ABOUT YOUR DRINKING: NO
TOTAL SCORE: 0
HAVE PEOPLE ANNOYED YOU BY CRITICIZING YOUR DRINKING: NO
HAVE YOU EVER FELT YOU SHOULD CUT DOWN ON YOUR DRINKING: NO

## 2021-08-05 ASSESSMENT — ENCOUNTER SYMPTOMS
DEPRESSION: 0
NAUSEA: 0
ABDOMINAL PAIN: 1
SHORTNESS OF BREATH: 0
PALPITATIONS: 0
CONSTIPATION: 0
DIZZINESS: 0
FOCAL WEAKNESS: 0
TINGLING: 0
DIARRHEA: 0
FEVER: 0
HEARTBURN: 0
FALLS: 0
LOSS OF CONSCIOUSNESS: 1
SPUTUM PRODUCTION: 0
NAUSEA: 1
STRIDOR: 0
MYALGIAS: 0
HEADACHES: 0
WEAKNESS: 0
CHILLS: 0
COUGH: 0
VOMITING: 0

## 2021-08-05 ASSESSMENT — PAIN DESCRIPTION - PAIN TYPE
TYPE: ACUTE PAIN

## 2021-08-05 NOTE — ASSESSMENT & PLAN NOTE
-Due to symptomatic anemia in combination with hypoxia (was switching home O2 cannister when syncope occurred).  -No need for additional work-up at this time

## 2021-08-05 NOTE — PROGRESS NOTES
Hospital Medicine Daily Progress Note    Date of Service  8/5/2021    Chief Complaint  Lily Nicole is a 25 y.o. female admitted 8/4/2021 with syncope    Hospital Course  Lily Nicole is a 25 y.o. female who presented 8/4/2021 with syncope.  Patient was at home, switching oxygen canisters when she suddenly felt lightheaded, told her sister she did not feel well and then passed out.  Patient does have a history of syncope in the past whenever her anemia had worsened.  Because she had a syncopal episode, she did present to the emergency department and was noted to have profound anemia.  She does have a history of lupus causing renal failure, did get dialysis today without issue.  She also does have profound anemia, currently at 5.3, does have antibodies as well.  She denied any sign of bleeding.  Patient states couple days ago she began having some abdominal pain which is acute on chronic, stated it was periumbilical, ache, 8/10 at its worse.  I did discuss the case including labs with the ER physician.    Interval Problem Update  8/5 - Pt hemodynamically stable, no further tachycardia, on 3L currently, is chronically on 3L.  Received 1u PRBC, Hgb went from 5.3-->6.1.  Will alert Dr Metz that pt is here and will may need HD tomorrow per her schedule.    I have personally seen and examined the patient at bedside. I discussed the plan of care with patient and bedside RN.    Consultants/Specialty  nephrology    Code Status  Full Code    Disposition  Patient is not medically cleared.   Anticipate discharge to to home with close outpatient follow-up.  I have placed the appropriate orders for post-discharge needs.    Review of Systems  Review of Systems   Constitutional: Negative for chills and fever.   Respiratory: Negative for cough and shortness of breath.    Cardiovascular: Negative for chest pain and leg swelling.   Gastrointestinal: Negative for heartburn, nausea and vomiting.   Neurological: Negative  for dizziness and focal weakness.   All other systems reviewed and are negative.       Physical Exam  Temp:  [36.7 °C (98 °F)-36.9 °C (98.4 °F)] 36.7 °C (98 °F)  Pulse:  [] 75  Resp:  [15-23] 15  BP: (117-156)/(77-97) 136/87  SpO2:  [96 %-100 %] 98 %    Physical Exam  Vitals and nursing note reviewed.   Constitutional:       Comments: Chronically ill appearing    HENT:      Head: Normocephalic and atraumatic.      Mouth/Throat:      Mouth: Mucous membranes are moist.   Cardiovascular:      Rate and Rhythm: Normal rate and regular rhythm.   Pulmonary:      Effort: Pulmonary effort is normal.      Breath sounds: Normal breath sounds.   Abdominal:      General: Abdomen is flat. Bowel sounds are normal.      Palpations: Abdomen is soft.   Musculoskeletal:         General: No swelling.   Skin:     General: Skin is warm and dry.   Neurological:      General: No focal deficit present.      Mental Status: She is alert and oriented to person, place, and time.   Psychiatric:         Mood and Affect: Mood normal.         Behavior: Behavior normal.         Fluids    Intake/Output Summary (Last 24 hours) at 8/5/2021 0728  Last data filed at 8/5/2021 0145  Gross per 24 hour   Intake 240 ml   Output --   Net 240 ml       Laboratory  Recent Labs     08/04/21 2246   WBC 2.9*   RBC 1.98*   HEMOGLOBIN 5.3*   HEMATOCRIT 18.5*   MCV 93.4   MCH 26.8*   MCHC 28.6*   RDW 61.7*   PLATELETCT 213   MPV 9.3     Recent Labs     08/04/21  2246   SODIUM 135   POTASSIUM 3.5*   CHLORIDE 90*   CO2 39*   GLUCOSE 95   BUN 12   CREATININE 2.93*   CALCIUM 9.0     Recent Labs     08/04/21 2246   APTT 30.0   INR 1.14*               Imaging  No orders to display        Assessment/Plan  Syncope  Assessment & Plan  -Due to symptomatic anemia in combination with hypoxia (was switching home O2 cannister when syncope occurred).  -No need for additional work-up at this time    Hypokalemia- (present on admission)  Assessment & Plan  -Mild, considering she  is a end-stage renal failure patient I will not start supplementation at this time    Chronic respiratory failure with hypoxia (HCC)- (present on admission)  Assessment & Plan  -At baseline, continue oxygen    Gastroesophageal reflux disease without esophagitis- (present on admission)  Assessment & Plan  -Continue home PPI and Carafate    Symptomatic anemia- (present on admission)  Assessment & Plan  -No sign of bleeding, closely monitor  -s/p 1u PRBC, hemoglobin appropriately went from 5.4--> 6.1; pt feeling better, is on her home O2.  Will hold off on ordering a second unit for now to avoid volume overload, will ask Nephro to see her, consider HD today with transfusion, or transfusion in the morning with HD   - Has long history of anemia secondary to both her ESRD as well as recurrent GIBs - currently no s/s of recurrent GIB, continue PPI and carafate     Lupus (HCC)- (present on admission)  Assessment & Plan  -Chronic, causing renal failure  -Continue home meds    HTN (hypertension)- (present on admission)  Assessment & Plan  -Continue home amlodipine, Coreg, clonidine, losartan  -Continue as needed clonidine, Vasotec and labetalol  -Adjust as needed    ESRD (end stage renal disease) on dialysis (HCC)- (present on admission)  Assessment & Plan  -Did have hemodialysis on Wednesday, gets Monday, Wednesday and Friday hemodialysis  -No recent issues during dialysis, syncope was hours after  - Will let Dr Metz know that pt is here        VTE prophylaxis: SCDs/TEDs    I have performed a physical exam and reviewed and updated ROS and Plan today (8/5/2021). In review of yesterday's note (8/4/2021), there are no changes except as documented above.

## 2021-08-05 NOTE — PROGRESS NOTES
4 Eyes Skin Assessment Completed by Noemi , RN and MICHELLE Watson.    Head WDL  Ears WDL  Nose WDL  Mouth WDL  Neck Scab Dry and flakey  Breast/Chest WDL  Shoulder Blades WDL  Spine WDL  (R) Arm/Elbow/Hand Scab and Discoloration, Fistula, dry  (L) Arm/Elbow/Hand Scab and Discoloration  Abdomen WDL  Groin WDL  Scrotum/Coccyx/Buttocks WDL  (R) Leg WDL  (L) Leg WDL  (R) Heel/Foot/Toe WDL  (L) Heel/Foot/Toe WDL    Generalized dryness and stretch marks.      Devices In Places Blood Pressure Cuff, Pulse Ox and Nasal Cannula      Interventions In Place Gray Ear Foams    Possible Skin Injury No    Pictures Uploaded Into Epic N/A  Wound Consult Placed N/A  RN Wound Prevention Protocol Ordered No

## 2021-08-05 NOTE — ED PROVIDER NOTES
ED Provider Note    ED Provider Note    Scribed for Allison Cormier MD by Allison Cormier M.D.. 8/4/2021, 10:27 PM.    Primary care provider: Ella Matthew M.D.  Means of arrival: EM  History obtained from: Patient  History limited by: None    CHIEF COMPLAINT  Chief Complaint   Patient presents with   • Syncope     family witnessed pt passing out  no injury    • Abdominal Pain     chronic abdomen pain        HPI  Lily Nicole is a 25 y.o. female who presents to the Emergency Department for evaluation of syncope; this occurred just prior to arrival.  Patient relates no significant preceding symptoms.  She relates that she was taking off off the concentrator, she suddenly felt quite lightheaded, and then collapsed into a chair.  Her sister witnessed the event.  Patient was unconscious for approximately 2 to 3 minutes.  She simply woke in the chair and heard her sister calling 911.  She has had this happen before or rarely when she was feeling very short of breath.  Of note she does undergo chronic dialysis for end-stage renal failure secondary to lupus.  She was dialyzed today but tells me that she did not have any significant volume taken off as far she is aware.  She notes no vomiting or diarrhea.  She does have chronic abdominal pain does note that seem to be worse prior to her symptoms and she is still experiencing generalized anterior pain acutely.  No recent fevers no particular ill contacts.  No significant trauma from the syncope.    REVIEW OF SYSTEMS  Pertinent positives include syncope, history of dialysis dependent renal failure, chronic abdominal pain. Pertinent negatives include no fever, no palpitations, no dyspnea, no chest pain.  All other systems reviewed and negative.    PAST MEDICAL HISTORY   has a past medical history of Anemia (01/17/2018), AVF (arteriovenous fistula) ([facility]), Chest pain, Chest tightness, Daytime sleepiness, Dialysis patient ([facility]), Difficulty breathing, ESRD  (end stage renal disease) on dialysis ([facility]) (01/17/2018), Gasping for breath, Heart burn, Hypertension (01/17/2018), Indigestion, Lupus (HCC), Migraines (01/17/2018), Painful breathing, Palpitations, Seizure ([facility]) (2013), Shortness of breath, Swelling of lower extremity, and Wheezing.    SURGICAL HISTORY   has a past surgical history that includes ronak by laparoscopy (4/5/2010); av fistula creation (Right); angioplasty (01/17/2018); other; other; gastroscopy-endo (12/9/2019); gastroscopy (N/A, 5/30/2020); gastroscopy-endo (9/18/2020); upper gi endoscopy,ctrl bleed (11/12/2020); upper gi endoscopy,biopsy (11/12/2020); gastroscopy-endo (11/12/2020); colonoscopy,diagnostic (1/8/2021); upper gi endoscopy,diagnosis (1/8/2021); upper gi endoscopy,ctrl bleed (1/8/2021); upper gi endoscopy,diagnosis (3/5/2021); upper gi endoscopy,diagnosis (N/A, 3/19/2021); upper gi endoscopy,ctrl bleed (3/19/2021); and bronchoscopy,diagnostic (Bilateral, 5/13/2021).    SOCIAL HISTORY  Social History     Tobacco Use   • Smoking status: Never Smoker   • Smokeless tobacco: Never Used   Vaping Use   • Vaping Use: Never used   Substance Use Topics   • Alcohol use: No   • Drug use: No      Social History     Substance and Sexual Activity   Drug Use No       FAMILY HISTORY  Family History   Problem Relation Age of Onset   • Diabetes Paternal Grandmother        CURRENT MEDICATIONS  Home Medications     Reviewed by Tara Tai R.N. (Registered Nurse) on 08/04/21 at 2154  Med List Status: Partial   Medication Last Dose Status   amLODIPine (NORVASC) 10 MG Tab  Active   carvedilol (COREG) 25 MG Tab  Active   cloNIDine (CATAPRES) 0.2 MG/24HR PATCH WEEKLY patch  Active   furosemide (LASIX) 80 MG Tab  Active   hydroxychloroquine (PLAQUENIL) 200 MG Tab  Active   losartan (COZAAR) 25 MG Tab  Active   mycophenolate (MYFORTIC) 180 MG EC tablet  Active   ondansetron (ZOFRAN ODT) 4 MG TABLET DISPERSIBLE  Active   pantoprazole (PROTONIX) 40 MG Tablet  "Delayed Response  Active   predniSONE (DELTASONE) 5 MG Tab  Active   sucralfate (CARAFATE) 1 GM/10ML Suspension  Active                ALLERGIES  Allergies   Allergen Reactions   • Cephalexin Rash     .   • Clindamycin Rash     .   • Methylprednisolone Unspecified     Anxious   • Metoprolol Rash     .   • Maxipime [Cefepime] Itching   • Norco [Hydrocodone-Acetaminophen] Nausea       PHYSICAL EXAM  VITAL SIGNS: /81   Pulse 96   Temp 36.9 °C (98.4 °F) (Oral)   Resp 20   Ht 1.676 m (5' 6\")   Wt 50.8 kg (112 lb)   SpO2 96%   BMI 18.08 kg/m²     General: Alert, mild acute distress  Skin: Warm, dry, pale  Head: Normocephalic, atraumatic  Neck: Trachea midline, no tenderness  Eye: PERRL, conjunctival pallor noted  ENMT: Oral mucosa pale and dry., no pharyngeal erythema or exudate  Cardiovascular: S1, S2, mildly tachycardic, otherwise regular rate and rhythm, No murmur, Normal peripheral perfusion  Respiratory: Lungs CTA, respirations are non-labored, breath sounds are equal  Gastrointestinal: Soft, nontender, non distended  Musculoskeletal: No swelling, no deformity  Neurological: Alert and oriented to person, place, time, and situation  Lymphatics: No lymphadenopathy  Psychiatric: Cooperative, appropriate mood & affect      DIAGNOSTIC STUDIES/PROCEDURES    LABS  Results for orders placed or performed during the hospital encounter of 08/04/21   CBC w/ Differential   Result Value Ref Range    WBC 2.9 (L) 4.8 - 10.8 K/uL    RBC 1.98 (L) 4.20 - 5.40 M/uL    Hemoglobin 5.3 (LL) 12.0 - 16.0 g/dL    Hematocrit 18.5 (L) 37.0 - 47.0 %    MCV 93.4 81.4 - 97.8 fL    MCH 26.8 (L) 27.0 - 33.0 pg    MCHC 28.6 (L) 33.6 - 35.0 g/dL    RDW 61.7 (H) 35.9 - 50.0 fL    Platelet Count 213 164 - 446 K/uL    MPV 9.3 9.0 - 12.9 fL    Neutrophils-Polys 79.60 (H) 44.00 - 72.00 %    Lymphocytes 12.00 (L) 22.00 - 41.00 %    Monocytes 7.50 0.00 - 13.40 %    Eosinophils 0.30 0.00 - 6.90 %    Basophils 0.30 0.00 - 1.80 %    Immature " Granulocytes 0.30 0.00 - 0.90 %    Nucleated RBC 0.00 /100 WBC    Neutrophils (Absolute) 2.32 2.00 - 7.15 K/uL    Lymphs (Absolute) 0.35 (L) 1.00 - 4.80 K/uL    Monos (Absolute) 0.22 0.00 - 0.85 K/uL    Eos (Absolute) 0.01 0.00 - 0.51 K/uL    Baso (Absolute) 0.01 0.00 - 0.12 K/uL    Immature Granulocytes (abs) 0.01 0.00 - 0.11 K/uL    NRBC (Absolute) 0.00 K/uL    Hypochromia 1+     Anisocytosis 1+     Macrocytosis 1+     Microcytosis 1+    Complete Metabolic Panel (CMP)   Result Value Ref Range    Sodium 135 135 - 145 mmol/L    Potassium 3.5 (L) 3.6 - 5.5 mmol/L    Chloride 90 (L) 96 - 112 mmol/L    Co2 39 (H) 20 - 33 mmol/L    Anion Gap 6.0 (L) 7.0 - 16.0    Glucose 95 65 - 99 mg/dL    Bun 12 8 - 22 mg/dL    Creatinine 2.93 (H) 0.50 - 1.40 mg/dL    Calcium 9.0 8.4 - 10.2 mg/dL    AST(SGOT) 16 12 - 45 U/L    ALT(SGPT) 7 2 - 50 U/L    Alkaline Phosphatase 83 30 - 99 U/L    Total Bilirubin 0.3 0.1 - 1.5 mg/dL    Albumin 3.5 3.2 - 4.9 g/dL    Total Protein 7.4 6.0 - 8.2 g/dL    Globulin 3.9 (H) 1.9 - 3.5 g/dL    A-G Ratio 0.9 g/dL   TSH (for screening thyroid dysfunction)   Result Value Ref Range    TSH 6.620 (H) 0.380 - 5.330 uIU/mL   APTT   Result Value Ref Range    APTT 30.0 24.7 - 36.0 sec   PT/INR   Result Value Ref Range    PT 13.7 12.0 - 14.6 sec    INR 1.14 (H) 0.87 - 1.13   Magnesium   Result Value Ref Range    Magnesium 1.6 1.5 - 2.5 mg/dL   COD (ADULT)   Result Value Ref Range    ABO Grouping Only O     Rh Grouping Only POS    LIPASE   Result Value Ref Range    Lipase 31 7 - 58 U/L   ESTIMATED GFR   Result Value Ref Range    GFR If  24 (A) >60 mL/min/1.73 m 2    GFR If Non African American 20 (A) >60 mL/min/1.73 m 2   PLATELET ESTIMATE   Result Value Ref Range    Plt Estimation Normal    MORPHOLOGY   Result Value Ref Range    RBC Morphology Present     Polychromia 1+     Poikilocytosis 1+     Ovalocytes 1+    DIFFERENTIAL COMMENT   Result Value Ref Range    Comments-Diff see below     COMPONENT CELLULAR   Result Value Ref Range    Component R       RI                  RedBloodCellsIRR    [id number]   selected     21   01:15      Product Type Red Blood Cells IRR LR     Dispense Status selected     Unit Number (Barcoded) [id number]     Product Code (Barcoded) W8239Y99     Blood Type (Barcoded) 5100     Component R       RI                  RedBloodCellsIRR    [id number]   selected     21   01:15      Product Type Red Blood Cells IRR LR     Dispense Status selected     Unit Number (Barcoded) [id number]     Product Code (Barcoded) X1527G81     Blood Type (Barcoded) 5100     Component R       RI                  RedBloodCellsIRR    [id number]   selected     21   01:15      Product Type Red Blood Cells IRR LR     Dispense Status selected     Unit Number (Barcoded) [id number]     Product Code (Barcoded) L1088H05     Blood Type (Barcoded) 5100    EKG   Result Value Ref Range    Report       [facility] Emergency Dept.    Test Date:  2021  Pt Name:    CASE WOODSON            Department: EDS  MRN:        [id number]                      [facility]  Gender:     Female                       Technician: TCM  :        1995                   Requested By:ER TRIAGE PROTOCOL  Order #:    [id number]                    Reading MD: ALBERT CORBIN MD    Measurements  Intervals                                Axis  Rate:       94                           P:          77  GA:         172                          QRS:        -13  QRSD:       84                           T:          101  QT:         372  QTc:        466    Interpretive Statements  SINUS RHYTHM  PROBABLE LEFT VENTRICULAR HYPERTROPHY  Compared to ECG 2021 02:37:12  Early repolarization no longer present  Electronically Signed On 2021 22:55:38 PDT by ALBERT CORBIN MD       All labs reviewed by me; impressive microcytic anemia, otherwise  "unremarkable.    EKG  12 Lead EKG obtained at 2249 and interpreted by me to show:  Rhythm: Normal sinus rhythm   Rate: 94  Axis: Left  Intervals: Normal  Q Waves: Normal  No diagnostic ST segment elevation  Consistent with left ventricular hypertrophy  Clinical Impression: Normal EKG  Compared to July 6, 2021, no significant change      COURSE & MEDICAL DECISION MAKING  Pertinent Labs & Imaging studies reviewed. (See chart for details)    10:27 PM - Patient seen and examined at bedside. Patient will be treated with morphine and Zofran for her abdominal pain. Ordered cardiac work-up to evaluate her symptoms. The differential diagnoses include but are not limited to: Cardiac dysrhythmia, anemia, electrolyte disturbance    2328: Patient reassessed, tachycardia persists, current heart rate 106, sinus tachycardia on the monitor.  I have updated her with work-up results.  No significant electrolyte abnormality other than very mild hypokalemia.  Indeed suspicion of anemia is confirmed.  Patient is infarct required transfusion several times for anemia of chronic disease secondary to her renal dysfunction.  I have ordered transfusion for her as well as premedication with acetaminophen and diphenhydramine.  She notes some epigastric burning discomfort consistent with previous GERD and requests Protonix.  Think is quite reasonable, I ordered 40 mg IV.    Patient Vitals for the past 24 hrs:   BP Temp Temp src Pulse Resp SpO2 Height Weight   08/05/21 0134 125/81 36.9 °C (98.4 °F) Oral 96 20 96 % -- --   08/04/21 2348 132/77 -- -- 86 16 100 % -- --   08/04/21 2318 129/79 -- -- 99 (!) 23 100 % -- --   08/04/21 2152 156/97 36.8 °C (98.3 °F) Temporal (!) 102 18 97 % -- --   08/04/21 2149 -- -- -- -- -- -- 1.676 m (5' 6\") 50.8 kg (112 lb)     Patient is critically ill.   The patient continues to have: Tachycardia and symptomatic anemia  The vital organ system that is affected is the: Cardiovascular  If untreated there is a high " chance of deterioration into: Heart failure  And eventually death.   The critical care that I am providing today is: Emergency transfusion of blood products  The critical that has been undertaken is medically complex.   There has been no overlap in critical care time.   Critical Care Time not including procedures: 46 minutes    Decision Making:  This is a 25 y.o. year old female who presents with syncopal episode without exertion.  Patient notes she simply felt lightheaded and lost consciousness for 2 to 3 minutes after she was taken off her oxygen concentrator.  She unfortunately has a history of lupus nephritis and suffers from end-stage renal disease and is on chronic dialysis, her nephrologist is Dr. Acevedo.  EKG is nonischemic but she has been primarily tachycardic throughout her stay.  She is very pale and I am highly suspicious for anemia.  Patient has thankfully had no GI bleed symptoms but is impressively anemic.  She has unfortunate required multiple infusions in the past as multiple antibodies.  I have ordered a single unit of packed red blood cells here in the ED to give my concern for possible volume overload in this dialysis patient.  Patient will need to be admitted for further evaluation and management.  I spoke with the hospitalist Dr. Styles who concurs and accepts the patient to his service.    Patient is admitted in improved guarded condition to the hospitalist Dr. Styles.    FINAL IMPRESSION  1. Syncope, unspecified syncope type    2. Symptomatic anemia     3.     End-stage renal disease on dialysis  4.      Acute hypokalemia     Allison PARRA M.D. (Gabrielle), am scribing for, and in the presence of, Allison Cormier MD.    Electronically signed by: Allison Cormier M.D. (Gabrielle), 8/4/2021    Allison PARRA MD personally performed the services described in this documentation, as scribed by Allison Cormier M.D. in my presence, and it is both accurate and  complete    The note accurately reflects work and decisions made by me.  Allison Cormier M.D.  8/5/2021  1:44 AM

## 2021-08-05 NOTE — ASSESSMENT & PLAN NOTE
-Mild, considering she is a end-stage renal failure patient I will not start supplementation at this time

## 2021-08-05 NOTE — ASSESSMENT & PLAN NOTE
-Did have hemodialysis on Wednesday, gets Monday, Wednesday and Friday hemodialysis  -No recent issues during dialysis, syncope was hours after  - Will let Dr Metz know that pt is here

## 2021-08-05 NOTE — PROGRESS NOTES
Blood products verified by 2 RN's. Consent received. Pt education provided. Pt states no further questions.    0325: Blood began.  0330: Pt denies SOB and chest pains. Pt denies chills and body aches. Pt denies flank pain. Pt resting comfortably in bed.   0340: 15 minutes post transfusion, pt tolerating well, vital signs taken

## 2021-08-05 NOTE — DISCHARGE PLANNING
Outpatient Dialysis Note    Confirmed patient is active at:    [address] [address]    Schedule: Monday, Wednesday, Friday   Time: 06:15am    Patient is seen by Dr. Trent in HD clinic.    Spoke with Viki at facility who confirmed.    Forwarded records for review.    Mary Han- Dialysis Coordinator # [phone]  [facility]

## 2021-08-05 NOTE — PROGRESS NOTES
Blood transfusion completed. Blood transfusion given with dialysis treatment. No signs of transfusion reaction noted.

## 2021-08-05 NOTE — DISCHARGE SUMMARY
"Discharge Summary    CHIEF COMPLAINT ON ADMISSION  Chief Complaint   Patient presents with   • Syncope     family witnessed pt passing out  no injury    • Abdominal Pain     chronic abdomen pain        Reason for Admission  EMS     Admission Date  8/4/2021    CODE STATUS  Full Code    HPI & HOSPITAL COURSE  This is a 25 y.o. female here with syncope - the patient has frequent hospitalizations due to her lupus with associated severe anemia, history of GIBs and ESRD. The patient is on chronic O2 supplementation, and she took it off to switch out her concentrators - this is when her syncope occurred. This is likely secondary to her acute on chronic anemia - she has not had any symptoms that would suggest recurrence of GIB, but rather, likely demonstrates a lack of control of her lupus.  She has been referred to [facility] for the same.  She was transfused 1u PRBC and went from 5.4--> 6.1, therefore, she was dialyzed and transfused a second unit.  She is hemodynamically stable, and will have a hemoglobin check with her PCP or hematologist in one week.    No notes on file    Therefore, she is discharged in good and stable condition to home with close outpatient follow-up.    The patient recovered much more quickly than anticipated on admission.    Discharge Date  8/5/21    FOLLOW UP ITEMS POST DISCHARGE  None    DISCHARGE DIAGNOSES  Active Problems:    ESRD (end stage renal disease) on dialysis (HCC) POA: Yes    HTN (hypertension) POA: Yes      Overview: \"Controlled with medication\"    Lupus (HCC) POA: Yes    Symptomatic anemia POA: Yes    Gastroesophageal reflux disease without esophagitis POA: Yes    Chronic respiratory failure with hypoxia (HCC) POA: Yes    Hypokalemia POA: Yes    Syncope POA: Unknown  Resolved Problems:    * No resolved hospital problems. *      FOLLOW UP  Future Appointments   Date Time Provider Department Center   8/7/2021  8:00 AM [facility]   8/19/2021  9:30 AM [facility]   8/21/2021  8:00 AM [facility]     Ella Matthew M.D.  [address] [facility] [address]  [phone]    In 1 week        MEDICATIONS ON DISCHARGE     Medication List      START taking these medications      Instructions   sucralfate 1 GM Tabs  Commonly known as: CARAFATE   Take 1 tablet by mouth 4 Times a Day,Before Meals and at Bedtime.  Dose: 1 g        CHANGE how you take these medications      Instructions   ondansetron 4 MG Tbdp  What changed: reasons to take this  Commonly known as: ZOFRAN ODT   Take 1 tablet by mouth every four hours as needed for Nausea.  Dose: 4 mg        CONTINUE taking these medications      Instructions   amLODIPine 10 MG Tabs  Commonly known as: NORVASC   Take 1 tablet by mouth every evening.  Dose: 10 mg     carvedilol 25 MG Tabs  Commonly known as: COREG   Take 1 tablet by mouth 2 times a day with meals.  Dose: 25 mg     cloNIDine 0.2 MG/24HR Ptwk patch  Commonly known as: CATAPRES   Place 1 Patch on the skin every 7 days.  Dose: 1 Patch     furosemide 80 MG Tabs  Commonly known as: LASIX   Take 3 Tablets by mouth 2 times a day.  Dose: 240 mg     hydroxychloroquine 200 MG Tabs  Commonly known as: Plaquenil   Take 200 mg by mouth every evening.  Dose: 200 mg     losartan 25 MG Tabs  Commonly known as: COZAAR   Take 1 tablet by mouth every day.  Dose: 25 mg     mycophenolate 180 MG EC tablet  Commonly known as: Myfortic   Take 1 Tab by mouth every evening.  Dose: 180 mg     pantoprazole 40 MG Tbec  Commonly known as: PROTONIX   Take 1 tablet by mouth 2 times a day.  Dose: 40 mg     predniSONE 5 MG Tabs  Commonly known as: DELTASONE   Take 5 mg by mouth every day.  Dose: 5 mg            Allergies  Allergies   Allergen Reactions   • Cephalexin Rash     .   • Clindamycin Rash     .   • Methylprednisolone Unspecified     Anxious   • Metoprolol Rash     .   • Maxipime [Cefepime] Itching   • Norco [Hydrocodone-Acetaminophen]  Nausea       DIET  Orders Placed This Encounter   Procedures   • Diet Order Diet: Renal     Standing Status:   Standing     Number of Occurrences:   1     Order Specific Question:   Diet:     Answer:   Renal [8]       ACTIVITY  As tolerated.  Weight bearing as tolerated    CONSULTATIONS  Nephrology - [place]     PROCEDURES  HD    LABORATORY  Lab Results   Component Value Date    SODIUM 136 08/05/2021    POTASSIUM 3.9 08/05/2021    CHLORIDE 93 (L) 08/05/2021    CO2 38 (H) 08/05/2021    GLUCOSE 83 08/05/2021    BUN 16 08/05/2021    CREATININE 3.73 (H) 08/05/2021        Lab Results   Component Value Date    WBC 3.2 (L) 08/05/2021    HEMOGLOBIN 6.1 (L) 08/05/2021    HEMATOCRIT 20.2 (L) 08/05/2021    PLATELETCT 178 08/05/2021        Total time of the discharge process exceeds 32 minutes.

## 2021-08-05 NOTE — ASSESSMENT & PLAN NOTE
-Continue home amlodipine, Coreg, clonidine, losartan  -Continue as needed clonidine, Vasotec and labetalol  -Adjust as needed

## 2021-08-05 NOTE — DISCHARGE PLANNING
Anticipated Discharge Disposition: Home     Action: Pt discussed during IDT rounds. Per Dr. Zapata pt to d/c with no needs.   Pt to receive dialysis prior to d/c home. Pt six clicks score of 24.     Barriers to Discharge: None     Plan: Care management to continue to follow for d/c needs

## 2021-08-05 NOTE — ED NOTES
Tran from Lab called with critical result of Hemoglobin of 5.3 at 22:59. Critical lab result read back to Tran.   Dr. Cormier notified of critical lab result at 22:59.  Critical lab result read back by Dr. Cormier.

## 2021-08-05 NOTE — PROGRESS NOTES
HD treatment today using RUAAVF.Transfused 1unit prbc during treatment with no adverse reaction.Treatment fairly tolerated.Patient with episode of symptomatic hotn in the last 30 mins of treatment,bp dropped in the 90's.Net UF removed 2100 ml.Report given to primary Rn.

## 2021-08-05 NOTE — PROGRESS NOTES
Received report from night shift RN. Patient A&Ox4, denies N/V, SOB. VSS. Patient on 3L O2, which is baseline. Blood transfusion complete. Safety precautions in place. Bed in lowest, locked position. Call light and belongings in reach. Patient denies further needs at this time.

## 2021-08-05 NOTE — H&P
Hospital Medicine History & Physical Note    Date of Service  8/5/2021    Primary Care Physician  Ella Matthew M.D.    Consultants  None    Specialist Names: None    Code Status  Full Code    Chief Complaint  Chief Complaint   Patient presents with   • Syncope     family witnessed pt passing out  no injury    • Abdominal Pain     chronic abdomen pain        History of Presenting Illness  Lily Nicole is a 25 y.o. female who presented 8/4/2021 with syncope.  Patient was at home, switching oxygen canisters when she suddenly felt lightheaded, told her sister she did not feel well and then passed out.  Patient does have a history of syncope in the past whenever her anemia had worsened.  Because she had a syncopal episode, she did present to the emergency department and was noted to have profound anemia.  She does have a history of lupus causing renal failure, did get dialysis today without issue.  She also does have profound anemia, currently at 5.3, does have antibodies as well.  She denied any sign of bleeding.  Patient states couple days ago she began having some abdominal pain which is acute on chronic, stated it was periumbilical, ache, 8/10 at its worse.  I did discuss the case including labs with the ER physician.    I discussed the plan of care with patient.    Review of Systems  Review of Systems   Constitutional: Negative for chills, fever and malaise/fatigue.   HENT: Negative for congestion.    Respiratory: Negative for cough, sputum production, shortness of breath and stridor.    Cardiovascular: Negative for chest pain, palpitations and leg swelling.   Gastrointestinal: Positive for abdominal pain and nausea. Negative for constipation, diarrhea and vomiting.   Genitourinary: Negative for dysuria and urgency.   Musculoskeletal: Negative for falls and myalgias.   Neurological: Positive for loss of consciousness. Negative for dizziness, tingling, weakness and headaches.   Psychiatric/Behavioral:  Negative for depression and suicidal ideas.   All other systems reviewed and are negative.      Past Medical History   has a past medical history of Anemia (01/17/2018), AVF (arteriovenous fistula) ([facility]), Chest pain, Chest tightness, Daytime sleepiness, Dialysis patient ([facility]), Difficulty breathing, ESRD (end stage renal disease) on dialysis ([facility]) (01/17/2018), Gasping for breath, Heart burn, Hypertension (01/17/2018), Indigestion, Lupus ([facility]), Migraines (01/17/2018), Painful breathing, Palpitations, Seizure ([facility]) (2013), Shortness of breath, Swelling of lower extremity, and Wheezing.    Surgical History   has a past surgical history that includes ronak by laparoscopy (4/5/2010); av fistula creation (Right); angioplasty (01/17/2018); other; other; gastroscopy-endo (12/9/2019); gastroscopy (N/A, 5/30/2020); gastroscopy-endo (9/18/2020); pr upper gi endoscopy,ctrl bleed (11/12/2020); pr upper gi endoscopy,biopsy (11/12/2020); gastroscopy-endo (11/12/2020); pr colonoscopy,diagnostic (1/8/2021); pr upper gi endoscopy,diagnosis (1/8/2021); pr upper gi endoscopy,ctrl bleed (1/8/2021); pr upper gi endoscopy,diagnosis (3/5/2021); pr upper gi endoscopy,diagnosis (N/A, 3/19/2021); pr upper gi endoscopy,ctrl bleed (3/19/2021); and pr bronchoscopy,diagnostic (Bilateral, 5/13/2021).     Family History  family history includes Diabetes in her paternal grandmother.   Family history reviewed with patient. There is no family history that is pertinent to the chief complaint.     Social History   reports that she has never smoked. She has never used smokeless tobacco. She reports that she does not drink alcohol and does not use drugs.    Allergies  Allergies   Allergen Reactions   • Cephalexin Rash     .   • Clindamycin Rash     .   • Methylprednisolone Unspecified     Anxious   • Metoprolol Rash     .   • Maxipime [Cefepime] Itching   • Norco [Hydrocodone-Acetaminophen] Nausea       Medications  Prior to Admission Medications    Prescriptions Last Dose Informant Patient Reported? Taking?   amLODIPine (NORVASC) 10 MG Tab  Patient's Home Pharmacy No No   Sig: Take 1 tablet by mouth every evening.   carvedilol (COREG) 25 MG Tab  Patient's Home Pharmacy No No   Sig: Take 1 tablet by mouth 2 times a day with meals.   cloNIDine (CATAPRES) 0.2 MG/24HR PATCH WEEKLY patch  Patient's Home Pharmacy Yes No   Sig: Place 1 Patch on the skin every 7 days.   furosemide (LASIX) 80 MG Tab   No No   Sig: Take 3 Tablets by mouth 2 times a day.   hydroxychloroquine (PLAQUENIL) 200 MG Tab  Patient's Home Pharmacy Yes No   Sig: Take 200 mg by mouth every evening.   losartan (COZAAR) 25 MG Tab  Patient's Home Pharmacy No No   Sig: Take 1 tablet by mouth every day.   mycophenolate (MYFORTIC) 180 MG EC tablet  Family Member No No   Sig: Take 1 Tab by mouth every evening.   ondansetron (ZOFRAN ODT) 4 MG TABLET DISPERSIBLE  Family Member No No   Sig: Take 1 tablet by mouth every four hours as needed for Nausea (give PO if no IV route available).   pantoprazole (PROTONIX) 40 MG Tablet Delayed Response   No No   Sig: Take 1 tablet by mouth 2 times a day.   predniSONE (DELTASONE) 5 MG Tab  Family Member Yes No   Sig: Take 5 mg by mouth every day.   sucralfate (CARAFATE) 1 GM/10ML Suspension  Patient's Home Pharmacy No No   Sig: Take 10 mL by mouth 4 Times a Day,Before Meals and at Bedtime.      Facility-Administered Medications: None       Physical Exam  Temp:  [36.8 °C (98.3 °F)] 36.8 °C (98.3 °F)  Pulse:  [] 99  Resp:  [18-23] 23  BP: (129-156)/(79-97) 129/79  SpO2:  [97 %-100 %] 100 %    Physical Exam  Vitals and nursing note reviewed.   Constitutional:       General: She is not in acute distress.     Appearance: She is well-developed. She is not diaphoretic.   HENT:      Head: Normocephalic and atraumatic.      Right Ear: External ear normal.      Left Ear: External ear normal.      Nose: Nose normal. No congestion or rhinorrhea.      Mouth/Throat:       Mouth: Mucous membranes are moist.      Pharynx: No oropharyngeal exudate.   Eyes:      General:         Right eye: No discharge.         Left eye: No discharge.      Extraocular Movements: Extraocular movements intact.   Neck:      Trachea: No tracheal deviation.   Cardiovascular:      Rate and Rhythm: Normal rate and regular rhythm.      Heart sounds: No murmur heard.   No friction rub. No gallop.    Pulmonary:      Effort: Pulmonary effort is normal. No respiratory distress.      Breath sounds: Normal breath sounds. No stridor. No wheezing or rales.   Chest:      Chest wall: No tenderness.   Abdominal:      General: Bowel sounds are normal. There is no distension.      Palpations: Abdomen is soft.      Tenderness: There is abdominal tenderness in the periumbilical area.   Musculoskeletal:         General: No tenderness. Normal range of motion.      Cervical back: Normal range of motion and neck supple.      Right lower leg: No edema.      Left lower leg: No edema.   Lymphadenopathy:      Cervical: No cervical adenopathy.   Skin:     General: Skin is warm and dry.      Coloration: Skin is not jaundiced.      Findings: No erythema or rash.   Neurological:      General: No focal deficit present.      Mental Status: She is alert and oriented to person, place, and time.      Cranial Nerves: No cranial nerve deficit.   Psychiatric:         Mood and Affect: Mood normal.         Behavior: Behavior normal.         Thought Content: Thought content normal.         Judgment: Judgment normal.         Laboratory:  Recent Labs     08/04/21 2246   WBC 2.9*   RBC 1.98*   HEMOGLOBIN 5.3*   HEMATOCRIT 18.5*   MCV 93.4   MCH 26.8*   MCHC 28.6*   RDW 61.7*   PLATELETCT 213   MPV 9.3     Recent Labs     08/04/21 2246   SODIUM 135   POTASSIUM 3.5*   CHLORIDE 90*   CO2 39*   GLUCOSE 95   BUN 12   CREATININE 2.93*   CALCIUM 9.0     Recent Labs     08/04/21 2246   ALTSGPT 7   ASTSGOT 16   ALKPHOSPHAT 83   TBILIRUBIN 0.3   LIPASE 31    GLUCOSE 95     Recent Labs     08/04/21  2246   APTT 30.0   INR 1.14*     No results for input(s): NTPROBNP in the last 72 hours.      No results for input(s): TROPONINT in the last 72 hours.    Imaging:  No orders to display       no X-Ray or EKG requiring interpretation    Assessment/Plan:  I anticipate this patient will require at least two midnights for appropriate medical management, necessitating inpatient admission.    Symptomatic anemia- (present on admission)  Assessment & Plan  -No sign of bleeding, closely monitor  -ERP has ordered 1 unit of blood at this time, she does have significant antibodies, blood is pending but will likely take a while  -If multiple units are needed, I would recommend second unit be given with dialysis    Syncope  Assessment & Plan  -Due to symptomatic anemia  -No need for additional work-up at this time    Hypokalemia- (present on admission)  Assessment & Plan  -Mild, considering she is a end-stage renal failure patient I will not start supplementation at this time    Chronic respiratory failure with hypoxia (HCC)- (present on admission)  Assessment & Plan  -At baseline, continue oxygen    Gastroesophageal reflux disease without esophagitis- (present on admission)  Assessment & Plan  -Continue home PPI and Carafate    Lupus (HCC)- (present on admission)  Assessment & Plan  -Chronic, causing renal failure  -Continue home meds    HTN (hypertension)- (present on admission)  Assessment & Plan  -Continue home amlodipine, Coreg, clonidine, losartan  -Start as needed clonidine, Vasotec and labetalol  -Adjust as needed    ESRD (end stage renal disease) on dialysis (HCC)- (present on admission)  Assessment & Plan  -Did have hemodialysis on Wednesday, gets Monday, Wednesday and Friday hemodialysis  -No recent issues during dialysis, syncope was hours after      VTE prophylaxis: SCDs/TEDs

## 2021-08-05 NOTE — ED NOTES
IV med's given per order  MD at  for re-evaluation   Pt made aware she will be admitted for severe anemia

## 2021-08-05 NOTE — DISCHARGE INSTRUCTIONS
"Diet: Diet Order Diet: Renal  Activity: As tolerated  Follow Up: Please see your PCP or hematologist in one week for a hemoglobin check  Disposition:Home  Diagnosis: Anemia    Follow up with your Primary Care Provider Ella Matthew M.D. as scheduled or sooner if your symptoms persist or worsen.  Return to Emergency Room for severe chest pain, shortness of breath, signs of a stroke, or any other emergencies.      Eating Plan for Dialysis  Dialysis is a treatment that cleans your blood. It is used when your kidneys are damaged. When you need dialysis, you should watch what you eat. This is because some nutrients can build up in your blood between treatments and make you sick.  Your doctor or diet specialist (dietitian) will:  · Tell you what nutrients you should include or avoid.  · Tell you how much of these nutrients you should get each day.  · Help you plan meals.  · Tell you how much to drink each day.  What are tips for following this plan?  Reading food labels  · Check food labels for:  ? Potassium. This is found in milk, fruits, and vegetables.  ? Phosphorus. This is found in milk, cheese, beans, nuts, and carbonated beverages.  ? Salt (sodium). This is in processed meats, cured meats, ready-made frozen meals, canned vegetables, and salty snack foods.  · Try to find foods that are low in potassium, phosphorus, and sodium.  · Look for foods that are labeled \"sodium free,\" \"reduced sodium,\" or \"low sodium.\"  Shopping  · Do not buy whole-grain and high-fiber foods.  · Do not buy or use salt substitutes.  · Do not buy processed foods.  Cooking  · Drain all fluid from cooked vegetables and canned fruits before you eat them.  · Before you cook potatoes, cut them into small pieces. Then boil them in unsalted water.  · Try using herbs and spices that do not contain sodium to add flavor.  Meal planning  Most people on dialysis should try to eat:  · 6-11 servings of grains each day. One serving is equal to 1 slice of " bread or ½ cup of cooked rice or pasta.  · 2-3 servings of low-potassium vegetables each day. One serving is equal to ½ cup.  · 2-3 servings of low-potassium fruits each day. One serving is equal to ½ cup.  · Protein, such as meat, poultry, fish, and eggs. Talk with your doctor or dietitian about the right amount and type of protein to eat.  · ½ cup of dairy each day.  General information  · Follow your doctor's instructions about how much to drink. You may be told to:  ? Write down what you drink.  ? Write down the foods you eat that are made mostly from water, such as gelatin and soups.  ? Drink from small cups.  · Take vitamin and mineral supplements only as told by your doctor.  · Take over-the-counter and prescription medicines only as told by your doctor.  What foods can I eat?         Fruits  Apples. Fresh or frozen berries. Fresh or canned pears, peaches, and pineapple. Grapes. Plums.  Vegetables  Fresh or frozen broccoli, carrots, and green beans. Cabbage. Cauliflower. Celery. Cucumbers. Eggplant. Radishes. Zucchini.  Grains  White bread. White rice. Cooked cereal. Unsalted popcorn. Tortillas. Pasta.  Meats and other proteins  Fresh or frozen beef, pork, chicken, and fish. Eggs.  Dairy  Cream cheese. Heavy cream. Ricotta cheese.  Beverages  Apple cider. Cranberry juice. Grape juice. Lemonade. Black coffee. Rice milk (that is not enriched or fortified).  Seasonings and condiments  Herbs. Spices. Jam and jelly. Honey.  Sweets and desserts  Sherbet. Cakes. Cookies.  Fats and oils  Olive oil, canola oil, and safflower oil.  Other foods  Non-dairy creamer. Non-dairy whipped topping. Homemade broth without salt.  The items listed above may not be a complete list of foods and beverages you can eat. Contact your dietitian for more options.  What foods should I avoid?  Fruits  Star fruit. Bananas. Oranges. Kiwi. Nectarines. Prunes. Melon. Dried fruit. Avocado.  Vegetables  Potatoes. Beets. Tomatoes. Winter squash  and pumpkin. Asparagus. Spinach. Parsnips.  Grains  Whole-grain bread. Whole-grain pasta. High-fiber cereal.  Meats and other proteins  Canned, smoked, and cured meats. Packaged lunch meat. Sardines. Nuts and seeds. Peanut butter. Beans and legumes.  Dairy  Milk. Buttermilk. Yogurt. Cheese and cottage cheese. Processed cheese spreads.  Beverages  Orange juice. Prune juice. Carbonated soft drinks.  Seasonings and condiments  Salt. Salt substitutes. Soy sauce.  Sweets and desserts  Ice cream. Chocolate. Candied nuts.  Fats and oils  Butter. Margarine.  Other foods  Ready-made frozen meals. Canned soups.  The items listed above may not be a complete list of foods and beverages you should avoid. Contact your dietitian for more information.  Summary  · If you are having dialysis, it is important to watch what you eat. Certain nutrients and wastes can build up in your blood and cause you to get sick.  · Your dietitian will help you make an eating plan that meets your needs.  · Avoid foods that are high in potassium, salt (sodium), and phosphorus. Restrict fluids as told by your doctor or dietitian.  This information is not intended to replace advice given to you by your health care provider. Make sure you discuss any questions you have with your health care provider.  Document Released: 06/18/2013 Document Revised: 03/06/2019 Document Reviewed: 12/19/2018  [employer] Patient Education © 2020 [employer]    Anemia    Anemia is a condition in which you do not have enough red blood cells or hemoglobin. Hemoglobin is a substance in red blood cells that carries oxygen. When you do not have enough red blood cells or hemoglobin (are anemic), your body cannot get enough oxygen and your organs may not work properly. As a result, you may feel very tired or have other problems.  What are the causes?  Common causes of anemia include:  · Excessive bleeding. Anemia can be caused by excessive bleeding inside or outside the body,  including bleeding from the intestine or from periods in women.  · Poor nutrition.  · Long-lasting (chronic) kidney, thyroid, and liver disease.  · Bone marrow disorders.  · Cancer and treatments for cancer.  · HIV (human immunodeficiency virus) and AIDS (acquired immunodeficiency syndrome).  · Treatments for HIV and AIDS.  · Spleen problems.  · Blood disorders.  · Infections, medicines, and autoimmune disorders that destroy red blood cells.  What are the signs or symptoms?  Symptoms of this condition include:  · Minor weakness.  · Dizziness.  · Headache.  · Feeling heartbeats that are irregular or faster than normal (palpitations).  · Shortness of breath, especially with exercise.  · Paleness.  · Cold sensitivity.  · Indigestion.  · Nausea.  · Difficulty sleeping.  · Difficulty concentrating.  Symptoms may occur suddenly or develop slowly. If your anemia is mild, you may not have symptoms.  How is this diagnosed?  This condition is diagnosed based on:  · Blood tests.  · Your medical history.  · A physical exam.  · Bone marrow biopsy.  Your health care provider may also check your stool (feces) for blood and may do additional testing to look for the cause of your bleeding.  You may also have other tests, including:  · Imaging tests, such as a CT scan or MRI.  · Endoscopy.  · Colonoscopy.  How is this treated?  Treatment for this condition depends on the cause. If you continue to lose a lot of blood, you may need to be treated at a hospital. Treatment may include:  · Taking supplements of iron, vitamin B12, or folic acid.  · Taking a hormone medicine (erythropoietin) that can help to stimulate red blood cell growth.  · Having a blood transfusion. This may be needed if you lose a lot of blood.  · Making changes to your diet.  · Having surgery to remove your spleen.  Follow these instructions at home:  · Take over-the-counter and prescription medicines only as told by your health care provider.  · Take supplements only  as told by your health care provider.  · Follow any diet instructions that you were given.  · Keep all follow-up visits as told by your health care provider. This is important.  Contact a health care provider if:  · You develop new bleeding anywhere in the body.  Get help right away if:  · You are very weak.  · You are short of breath.  · You have pain in your abdomen or chest.  · You are dizzy or feel faint.  · You have trouble concentrating.  · You have bloody or black, tarry stools.  · You vomit repeatedly or you vomit up blood.  Summary  · Anemia is a condition in which you do not have enough red blood cells or enough of a substance in your red blood cells that carries oxygen (hemoglobin).  · Symptoms may occur suddenly or develop slowly.  · If your anemia is mild, you may not have symptoms.  · This condition is diagnosed with blood tests as well as a medical history and physical exam. Other tests may be needed.  · Treatment for this condition depends on the cause of the anemia.  This information is not intended to replace advice given to you by your health care provider. Make sure you discuss any questions you have with your health care provider.  Document Released: 01/25/2006 Document Revised: 11/30/2018 Document Reviewed: 01/19/2018  [employer] Patient Education © 2020 [employer]    Blood Transfusion, Adult, Care After  This sheet gives you information about how to care for yourself after your procedure. Your doctor may also give you more specific instructions. If you have problems or questions, contact your doctor.  Follow these instructions at home:    Take over-the-counter and prescription medicines only as told by your doctor.  Go back to your normal activities as told by your doctor.  Follow instructions from your doctor about how to take care of the area where an IV tube was put into your vein (insertion site). Make sure you:  Wash your hands with soap and water before you change your bandage  (dressing). If there is no soap and water, use hand .  Change your bandage as told by your doctor.  Check your IV insertion site every day for signs of infection. Check for:  More redness, swelling, or pain.  More fluid or blood.  Warmth.  Pus or a bad smell.  Contact a doctor if:  You have more redness, swelling, or pain around the IV insertion site.  You have more fluid or blood coming from the IV insertion site.  Your IV insertion site feels warm to the touch.  You have pus or a bad smell coming from the IV insertion site.  Your pee (urine) turns pink, red, or brown.  You feel weak after doing your normal activities.  Get help right away if:  You have signs of a serious allergic or body defense (immune) system reaction, including:  Itchiness.  Hives.  Trouble breathing.  Anxiety.  Pain in your chest or lower back.  Fever, flushing, and chills.  Fast pulse.  Rash.  Watery poop (diarrhea).  Throwing up (vomiting).  Dark pee.  Serious headache.  Dizziness.  Stiff neck.  Yellow color in your face or the white parts of your eyes (jaundice).  Summary  After a blood transfusion, return to your normal activities as told by your doctor.  Every day, check for signs of infection where the IV tube was put into your vein.  Some signs of infection are warm skin, more redness and pain, more fluid or blood, and pus or a bad smell where the needle went in.  Contact your doctor if you feel weak or have any unusual symptoms.  This information is not intended to replace advice given to you by your health care provider. Make sure you discuss any questions you have with your health care provider.  Document Released: 01/08/2016 Document Revised: 04/24/2019 Document Reviewed: 08/11/2017  [employer] Patient Education © 2020 [employer]  Discharge Instructions    Discharged to home by car with relative. Discharged via wheelchair, hospital escort: Yes.  Special equipment needed: Oxygen    Be sure to schedule a follow-up  appointment with your primary care doctor or any specialists as instructed.     Discharge Plan:   Diet Plan: Discussed  Activity Level: Discussed  Confirmed Follow up Appointment: Patient to Call and Schedule Appointment  Confirmed Symptoms Management: Discussed  Medication Reconciliation Updated: Yes    I understand that a diet low in cholesterol, fat, and sodium is recommended for good health. Unless I have been given specific instructions below for another diet, I accept this instruction as my diet prescription.   Other diet: Renal diet    Special Instructions: None    · Is patient discharged on Warfarin / Coumadin?   No     Depression / Suicide Risk    As you are discharged from this [facility] facility, it is important to learn how to keep safe from harming yourself.    Recognize the warning signs:  · Abrupt changes in personality, positive or negative- including increase in energy   · Giving away possessions  · Change in eating patterns- significant weight changes-  positive or negative  · Change in sleeping patterns- unable to sleep or sleeping all the time   · Unwillingness or inability to communicate  · Depression  · Unusual sadness, discouragement and loneliness  · Talk of wanting to die  · Neglect of personal appearance   · Rebelliousness- reckless behavior  · Withdrawal from people/activities they love  · Confusion- inability to concentrate     If you or a loved one observes any of these behaviors or has concerns about self-harm, here's what you can do:  · Talk about it- your feelings and reasons for harming yourself  · Remove any means that you might use to hurt yourself (examples: pills, rope, extension cords, firearm)  · Get professional help from the community (Mental Health, Substance Abuse, psychological counseling)  · Do not be alone:Call your Safe Contact- someone whom you trust who will be there for you.  · Call your local CRISIS HOTLINE 283-1351 or 929-631-9326  · Call your local Children's  Mobile Crisis Response Team [place] (788) 880-3982 or www.[employer].Skysheet  · Call the toll free National Suicide Prevention Hotlines   · National Suicide Prevention Lifeline 323-077-ELRG (4098)  · National Hope Line Network 800-SUICIDE (015-6825)

## 2021-08-05 NOTE — ASSESSMENT & PLAN NOTE
-No sign of bleeding, closely monitor  -s/p 1u PRBC, hemoglobin appropriately went from 5.4--> 6.1; pt feeling better, is on her home O2.  Will hold off on ordering a second unit for now to avoid volume overload, will ask Nephro to see her, consider HD today with transfusion, or transfusion in the morning with HD   - Has long history of anemia secondary to both her ESRD as well as recurrent GIBs - currently no s/s of recurrent GIB, continue PPI and carafate

## 2021-08-05 NOTE — CONSULTS
DATE OF SERVICE:  08/05/2021     REQUESTING PHYSICIAN:  Tamra Zapata MD     REASON FOR CONSULTATION:  Evaluate and provide dialysis for the patient with   end-stage renal disease.     HISTORY OF PRESENT ILLNESS:  The patient is a 25-year-old female with   longstanding history of systemic lupus erythematosus as a cause of her renal   failure in end-stage renal disease on hemodialysis for several years,   receiving her dialysis treatments in the [facility] on   Monday, Wednesday and Friday.  Last dialysis completed yesterday without   complications, admitted to the hospital with near syncopal episode, found   severely anemic with hemoglobin down to 5.3.  Currently feels better, no   complaints, receiving blood transfusion with dialysis.     REVIEW OF SYSTEMS:  GENERAL:  Positive for fatigue.  No nausea or vomiting.  No fever or chills.  HEENT:  No nosebleeds, no sore throat, no sinus pain.  EYES:  No double or blurry vision.  NECK:  No pain, no stiffness.  RESPIRATORY:  No shortness of breath, no cough, no hemoptysis.  CARDIOVASCULAR:  No chest pain, palpitations.  No edema.  GASTROINTESTINAL:  No nausea, vomiting, no diarrhea.     All other systems reviewed negative.     PAST MEDICAL HISTORY:  End-stage renal disease, on hemodialysis, systemic   lupus erythematosus, migraines, anemia, hypertension.     PAST SURGICAL HISTORY:  AV fistula creation, gastroscopy, colonoscopy,   cholecystectomy.     FAMILY HISTORY:  Diabetes mellitus type 2.  No history of kidney disease.     SOCIAL HISTORY:  No tobacco, alcohol or drug use.     ALLERGIES:  ALLERGIC TO KEFLEX, CLINDAMYCIN, METHYLPREDNISOLONE, METOPROLOL,   CEFEPIME, NORCO.     OUTPATIENT MEDICATIONS:  The patient's medications reviewed.     PHYSICAL EXAMINATION:  VITAL SIGNS:  Blood pressure 117/72, heart rate 71, temperature 36.7 Celsius.  GENERAL APPEARANCE:  Well-developed, well-nourished female, in no acute   distress.  HEENT:  Normocephalic,  atraumatic.  Pupils equal, round, reactive to light.    Extraocular movement intact.  Nares patent.  Oropharynx clear, moist mucosa,   no erythema or exudate.  LUNGS:  Clear to auscultation bilaterally.  No rales, wheezes, no rhonchi.  HEART:  Regular rhythm, no rub or gallop.  ABDOMEN:  Soft, nontender, nondistended.  Bowel sounds present.  No palpable   mass.  EXTREMITIES:  No cyanosis, no clubbing, no edema.  AV fistula has good thrill.  NEUROLOGIC:  Alert, oriented x3, no focal deficit.  Cranial nerves II-XII   grossly intact.     LABORATORY RESULTS:  Reviewed, revealed hemoglobin upon admission 5.3,   repeated 6.1.     Sodium 136, potassium 3.9.     BUN 16, creatinine 3.73.     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  1.  End-stage renal disease.  We will continue dialysis today, then per   patient, the patient's schedule Monday, Wednesday, Friday.  2.  Electrolytes remains well controlled.  3.  Hypertension.  Blood pressure remains well controlled.  4.  Anemia.  Continue blood transfusions.  5.  Epogen with hemodialysis on Monday, Wednesday and Friday.  6.  Volume to attempt ultrafiltration as blood pressure tolerates.  Monitor   weight.     RECOMMENDATIONS:  Hemodialysis today, then Monday, Wednesday, Friday, blood   transfusion with dialysis.  Medications adjust to renal doses.  We will follow   the patient closely.     Thank you for the consult.  Above plan was discussed with Dr. Tamra Zapata.        ______________________________  MD ROSANNA MOREIRA/JORGITO    DD:  08/05/2021 13:29  DT:  08/05/2021 14:10    Job#:  [id number]

## 2021-08-05 NOTE — ED TRIAGE NOTES
Pt comes in via [facility]  c/o syncope at home  Witnessed by family  No injury per pt   Also c/o chronic abdomen pain unknown cause   Did have dialysis today  Nothing usual was done there

## 2021-08-05 NOTE — PROGRESS NOTES
Received report from ER RN at 0052.   Pt up to the floor at 0130. Pt is A+Ox4, VSS on 3L of O2 which pt states is baseline. Pt denies SOB, Chest pain, and N/V. Pt denies the need for pain medication at this time. Pt educated on POC and fall precautions. Yellow wrist band placed on pt, yellow socks on, Bed alarm on. Bed locked in lowest position, call light in reach, rounding in place.

## 2021-08-06 DIAGNOSIS — N18.6 ANEMIA DUE TO CHRONIC KIDNEY DISEASE, ON CHRONIC DIALYSIS (HCC): ICD-10-CM

## 2021-08-06 DIAGNOSIS — Z99.2 ANEMIA DUE TO CHRONIC KIDNEY DISEASE, ON CHRONIC DIALYSIS (HCC): ICD-10-CM

## 2021-08-06 DIAGNOSIS — D63.1 ANEMIA DUE TO CHRONIC KIDNEY DISEASE, ON CHRONIC DIALYSIS (HCC): ICD-10-CM

## 2021-08-06 PROCEDURE — 86902 BLOOD TYPE ANTIGEN DONOR EA: CPT | Mod: 91

## 2021-08-06 RX ORDER — HEPARIN SODIUM (PORCINE) LOCK FLUSH IV SOLN 100 UNIT/ML 100 UNIT/ML
500 SOLUTION INTRAVENOUS PRN
Status: CANCELLED | OUTPATIENT
Start: 2021-08-06

## 2021-08-06 RX ORDER — 0.9 % SODIUM CHLORIDE 0.9 %
10 VIAL (ML) INJECTION PRN
Status: CANCELLED | OUTPATIENT
Start: 2021-08-06

## 2021-08-06 RX ORDER — ACETAMINOPHEN 325 MG/1
650 TABLET ORAL PRN
Status: CANCELLED | OUTPATIENT
Start: 2021-08-06

## 2021-08-06 RX ORDER — ACETAMINOPHEN 325 MG/1
650 TABLET ORAL ONCE
Status: CANCELLED | OUTPATIENT
Start: 2021-08-06

## 2021-08-06 RX ORDER — SODIUM CHLORIDE 9 MG/ML
INJECTION, SOLUTION INTRAVENOUS CONTINUOUS
Status: CANCELLED | OUTPATIENT
Start: 2021-08-06

## 2021-08-06 RX ORDER — 0.9 % SODIUM CHLORIDE 0.9 %
VIAL (ML) INJECTION PRN
Status: CANCELLED | OUTPATIENT
Start: 2021-08-06

## 2021-08-06 RX ORDER — 0.9 % SODIUM CHLORIDE 0.9 %
3 VIAL (ML) INJECTION PRN
Status: CANCELLED | OUTPATIENT
Start: 2021-08-06

## 2021-08-06 RX ORDER — DIPHENHYDRAMINE HYDROCHLORIDE 50 MG/ML
25 INJECTION INTRAMUSCULAR; INTRAVENOUS PRN
Status: CANCELLED | OUTPATIENT
Start: 2021-08-06

## 2021-08-06 RX ORDER — DIPHENHYDRAMINE HCL 25 MG
25 TABLET ORAL ONCE
Status: CANCELLED | OUTPATIENT
Start: 2021-08-06 | End: 2021-08-06

## 2021-08-06 NOTE — PROGRESS NOTES
Discharging patient home per MD orders. Patient demonstrated understanding of discharge instructions and educational materials, as well as, follow up appointments, medications, and prescriptions. Patient is voiding without difficulty, pain is well controlled, tolerating oral medications. O2 is greater than 90% with 3L, which is baseline. All questions have been answered. Patient has been discharged off the unit with a hospital escort.

## 2021-08-07 ENCOUNTER — OUTPATIENT INFUSION SERVICES (OUTPATIENT)
Dept: ONCOLOGY | Facility: MEDICAL CENTER | Age: 26
End: 2021-08-07
Attending: INTERNAL MEDICINE
Payer: COMMERCIAL

## 2021-08-07 NOTE — PROGRESS NOTES
Pt did not arrive for appt, PAR called pt and she said as she rec'd blood in the hospital on Thursday that she did not need to come today. Informed blood bank.

## 2021-08-08 ENCOUNTER — HOSPITAL ENCOUNTER (EMERGENCY)
Facility: MEDICAL CENTER | Age: 26
End: 2021-08-09
Attending: EMERGENCY MEDICINE
Payer: COMMERCIAL

## 2021-08-08 DIAGNOSIS — J02.9 PHARYNGITIS, UNSPECIFIED ETIOLOGY: ICD-10-CM

## 2021-08-08 PROCEDURE — 99283 EMERGENCY DEPT VISIT LOW MDM: CPT

## 2021-08-08 PROCEDURE — 87651 STREP A DNA AMP PROBE: CPT

## 2021-08-08 ASSESSMENT — FIBROSIS 4 INDEX: FIB4 SCORE: 0.78

## 2021-08-09 VITALS
BODY MASS INDEX: 18.81 KG/M2 | DIASTOLIC BLOOD PRESSURE: 109 MMHG | RESPIRATION RATE: 18 BRPM | SYSTOLIC BLOOD PRESSURE: 173 MMHG | OXYGEN SATURATION: 95 % | HEIGHT: 65 IN | WEIGHT: 112.88 LBS | HEART RATE: 91 BPM | TEMPERATURE: 99.3 F

## 2021-08-09 LAB
BARCODED ABORH UBTYP: 5100
BARCODED PRD CODE UBPRD: NORMAL
BARCODED UNIT NUM UBUNT: NORMAL
COMPONENT R 8504R: NORMAL
PRODUCT TYPE UPROD: NORMAL
S PYO DNA SPEC NAA+PROBE: NOT DETECTED
UNIT STATUS USTAT: NORMAL

## 2021-08-09 PROCEDURE — 700102 HCHG RX REV CODE 250 W/ 637 OVERRIDE(OP): Performed by: EMERGENCY MEDICINE

## 2021-08-09 PROCEDURE — 700111 HCHG RX REV CODE 636 W/ 250 OVERRIDE (IP): Performed by: EMERGENCY MEDICINE

## 2021-08-09 PROCEDURE — A9270 NON-COVERED ITEM OR SERVICE: HCPCS | Performed by: EMERGENCY MEDICINE

## 2021-08-09 RX ORDER — ACETAMINOPHEN 500 MG
1000 TABLET ORAL ONCE
Status: DISCONTINUED | OUTPATIENT
Start: 2021-08-09 | End: 2021-08-09

## 2021-08-09 RX ORDER — ONDANSETRON 4 MG/1
4 TABLET, ORALLY DISINTEGRATING ORAL ONCE
Status: COMPLETED | OUTPATIENT
Start: 2021-08-09 | End: 2021-08-09

## 2021-08-09 RX ORDER — ONDANSETRON 2 MG/ML
4 INJECTION INTRAMUSCULAR; INTRAVENOUS ONCE
Status: DISCONTINUED | OUTPATIENT
Start: 2021-08-09 | End: 2021-08-09

## 2021-08-09 RX ORDER — ACETAMINOPHEN 160 MG/5ML
650 SUSPENSION ORAL ONCE
Status: COMPLETED | OUTPATIENT
Start: 2021-08-09 | End: 2021-08-09

## 2021-08-09 RX ADMIN — ACETAMINOPHEN 650 MG: 160 SUSPENSION ORAL at 00:38

## 2021-08-09 RX ADMIN — ONDANSETRON 4 MG: 4 TABLET, ORALLY DISINTEGRATING ORAL at 00:57

## 2021-08-09 NOTE — ED NOTES
Patient crying and feels anxious.  Patient spitting up and vomited approximately 50 ml of yellowish sediment.  Zofran ODT given    Dr Leon at bedside

## 2021-08-09 NOTE — DISCHARGE INSTRUCTIONS
You were seen in the emergency department for a sore throat.  Your physical exam, vital signs were reassuring.  Your rapid strep test was negative.  The cause of your sore throat is not clear but is likely from a virus.    We recommend taking Tylenol for your pain.  You may also do warm salt water gargles.  There is an over-the-counter Cepacol spray which can also be helpful for the symptoms.    Please follow-up closely with your regular doctor.    Please return to the emergency department or seek medical attention if you develop:  Difficulty breathing, abdominal pains, vomiting, any other new or concerning findings

## 2021-08-09 NOTE — ED PROVIDER NOTES
"ED Provider Note        Means of Arrival: Private Vehicle  History obtained from: Patient, medical record    CHIEF COMPLAINT  Chief Complaint   Patient presents with   • Sore Throat     Patient of burning sensation pain in her throat since Friday.  No fever. No cough        HPI  Lily Nicole is a 25 y.o. female who presents with sore throat.  The patient reports she was dialyzed normally on Friday, and began developing throat pain.  He has worsened yesterday and today to the point where she has a hard time swallowing water.  She denies any fevers, cough, difficulty breathing.  She denies any unilateral symptoms.    REVIEW OF SYSTEMS    CONSTITUTIONAL:  No fever.  CARDIOVASCULAR:  No chest discomfort.  RESPIRATORY:  No pleuritic chest pain.  GASTROINTESTINAL:  No abdominal pain.    See HPI for further details.       PAST MEDICAL HISTORY  Past Medical History:   Diagnosis Date   • Anemia 01/17/2018   • AVF (arteriovenous fistula) ([facility])     Right Arm   • Chest pain    • Chest tightness    • Daytime sleepiness    • Dialysis patient ([facility])      dialysis, M,W,F Elizabeth/Joni   • Difficulty breathing    • ESRD (end stage renal disease) on dialysis ([facility]) 01/17/2018    Twan Fu   • Gasping for breath    • Heart burn    • Hypertension 01/17/2018    \"Controlled with medication\"   • Indigestion    • Lupus ([facility])    • Migraines 01/17/2018   • Painful breathing    • Palpitations    • Seizure ([facility]) 2013    from high blood pressure, reports 1 time event   • Shortness of breath    • Swelling of lower extremity    • Wheezing        FAMILY HISTORY  Family History   Problem Relation Age of Onset   • Diabetes Paternal Grandmother        SOCIAL HISTORY   reports that she has never smoked. She has never used smokeless tobacco. She reports that she does not drink alcohol and does not use drugs.    SURGICAL HISTORY  Past Surgical History:   Procedure Laterality Date   • PB BRONCHOSCOPY,DIAGNOSTIC Bilateral 5/13/2021    Procedure: " BRONCHOSCOPY, BRONCHOALVEOLAR LAVAGE;  Surgeon: William Spangler M.D.;  Location: [facility];  Service: Pulmonary   • PB UPPER GI ENDOSCOPY,DIAGNOSIS N/A 3/19/2021    Procedure: GASTROSCOPY;  Surgeon: Waylon Mcqueen M.D.;  Location: [facility];  Service: Gastroenterology   • PB UPPER GI ENDOSCOPY,CTRL BLEED  3/19/2021    Procedure: GASTROSCOPY, WITH ARGON PLASMA COAGULATION;  Surgeon: Waylon Mcqueen M.D.;  Location: [facility];  Service: Gastroenterology   • PB UPPER GI ENDOSCOPY,DIAGNOSIS  3/5/2021    Procedure: GASTROSCOPY - W/HEMOSTASIS;  Surgeon: Ej Silva M.D.;  Location: [facility];  Service: Gastroenterology   • PB COLONOSCOPY,DIAGNOSTIC  1/8/2021    Procedure: COLONOSCOPY;  Surgeon: Herbert Contreras M.D.;  Location: [facility];  Service: Gastroenterology   • PB UPPER GI ENDOSCOPY,DIAGNOSIS  1/8/2021    Procedure: GASTROSCOPY;  Surgeon: Herbert Contreras M.D.;  Location: [facility];  Service: Gastroenterology   • PB UPPER GI ENDOSCOPY,CTRL BLEED  1/8/2021    Procedure: GASTROSCOPY, WITH ARGON PLASMA COAGULATION;  Surgeon: Herbert Contreras M.D.;  Location: [facility];  Service: Gastroenterology   • PB UPPER GI ENDOSCOPY,CTRL BLEED  11/12/2020    Procedure: GASTROSCOPY, WITH ARGON PLASMA COAGULATION;  Surgeon: Gadiel Whitney M.D.;  Location: [facility];  Service: Gastroenterology   • PB UPPER GI ENDOSCOPY,BIOPSY  11/12/2020    Procedure: GASTROSCOPY, WITH BIOPSY;  Surgeon: Gadiel Whitney M.D.;  Location: [facility];  Service: Gastroenterology   • GASTROSCOPY-ENDO  11/12/2020    Procedure: GASTROSCOPY;  Surgeon: Gadiel Whitney M.D.;  Location: [facility];  Service: Gastroenterology   • GASTROSCOPY-ENDO  9/18/2020    Procedure: GASTROSCOPY;  Surgeon: Gadiel Whitney M.D.;  Location: [facility];  Service: Gastroenterology   • GASTROSCOPY N/A 5/30/2020    Procedure: GASTROSCOPY;  Surgeon: Waylon JASON  "TERRI Mcqueen;  Location: [facility];  Service: Gastroenterology   • GASTROSCOPY-ENDO  12/9/2019    Procedure: GASTROSCOPY;  Surgeon: Aaron Kam M.D.;  Location: SUR[facility]ERY [facility];  Service: Gastroenterology   • ANGIOPLASTY  01/17/2018    \"Right Arm AV-Fistulagram & Angioplastyx3\"   • ULI BY LAPAROSCOPY  4/5/2010    Performed by SYED MARTELL at SURGERY [facility]   • AV FISTULA CREATION Right    • OTHER      renal biopsy x 3   • OTHER      bone marrow biopsy       CURRENT MEDICATIONS  Home Medications    **Home medications have not yet been reviewed for this encounter**         ALLERGIES  Allergies   Allergen Reactions   • Cephalexin Rash     .   • Clindamycin Rash     .   • Methylprednisolone Unspecified     Anxious   • Metoprolol Rash     .   • Maxipime [Cefepime] Itching   • Norco [Hydrocodone-Acetaminophen] Nausea       PHYSICAL EXAM  VITAL SIGNS: BP (!) 173/109   Pulse 91   Temp 37.4 °C (99.3 °F) (Tympanic)   Resp 18   Ht 1.651 m (5' 5\")   Wt 51.2 kg (112 lb 14 oz)   LMP 08/02/2021   SpO2 95%   BMI 18.78 kg/m²    Gen: alert, no acute distress  HENT: ATNC,  Normal oropharynx.  No anterior cervical lymphadenopathy.  Eyes: normal conjunctiva  Resp: No resipiratory distress.  On home nasal cannula oxygen  CV: No JVD, RRR  Abd: Non-distended  Extremities: No deformity  Skin: Diffuse rash.        LABS  Labs Reviewed   GROUP A STREP BY PCR          COURSE & MEDICAL DECISION MAKING  Pertinent Labs & Imaging studies reviewed. (See chart for details)    Review medical record demonstrates the patient has a history of lupus with associated severe anemia, ESRD on hemodialysis as well as chronic oxygen supplementation. review of medical record shows patient was recently hospitalized for anemia, received blood transfusion.    Patient has normal-appearing oropharynx.  Will send rapid strep.  Low suspicion for peritonsillar retropharyngeal abscess.  No evidence of cervical " lymphadenopathy to suggest mono.    Rapid strep was negative.  I did discuss these results with the patient.  We discussed possible dexamethasone for her acute pharyngitis, and she is on chronic prednisone.  She declines this.  Patient then began crying stating that she feels unwell since the start of her lupus flare back in December.  She denies any other new complaints besides a sore throat.  Low suspicion for acute HIV infection, gonococcal sore throat, other dangerous etiologies.  I attempted to discuss other therapeutic options with the patient but she prefers discharge.    Appropriate PPE were worn during this encounter.    FINAL IMPRESSION  1. Pharyngitis, unspecified etiology         DISPOSITION:  Patient will be discharged home in stable condition.    FOLLOW UP:  Ella Matthew M.D.  [address] [facility] [id number] [phone]    Schedule an appointment as soon as possible for a visit       [facility], Emergency Dept  [address]  [facility] [id number] [phone]    If symptoms worsen      OUTPATIENT MEDICATIONS:  Discharge Medication List as of 8/9/2021  1:22 AM             This dictation was created using voice recognition software. The accuracy of the dictation is limited to the abilities of the software. I expect there may be some errors of grammar and possibly content. The nursing notes were reviewed and certain aspects of this information were incorporated into this note.

## 2021-08-09 NOTE — ED TRIAGE NOTES
"25 yr old female to triage   Chief Complaint   Patient presents with   • Sore Throat     Patient of burning sensation pain in her throat since Friday.  No fever. No cough      /96   Pulse 93   Temp 37.4 °C (99.3 °F) (Tympanic)   Resp 16   Ht 1.651 m (5' 5\")   Wt 51.2 kg (112 lb 14 oz)   LMP 08/02/2021   BMI 18.78 kg/m²     Patient uses home oxygen at 3 L NC.  Denies feeling short of breath.    "

## 2021-08-13 ENCOUNTER — HOSPITAL ENCOUNTER (OUTPATIENT)
Dept: RADIOLOGY | Facility: MEDICAL CENTER | Age: 26
End: 2021-08-13
Attending: NURSE PRACTITIONER
Payer: COMMERCIAL

## 2021-08-13 ENCOUNTER — OFFICE VISIT (OUTPATIENT)
Dept: URGENT CARE | Facility: PHYSICIAN GROUP | Age: 26
End: 2021-08-13
Payer: COMMERCIAL

## 2021-08-13 ENCOUNTER — HOSPITAL ENCOUNTER (OUTPATIENT)
Facility: MEDICAL CENTER | Age: 26
End: 2021-08-13
Attending: NURSE PRACTITIONER
Payer: COMMERCIAL

## 2021-08-13 VITALS
WEIGHT: 112 LBS | BODY MASS INDEX: 17.58 KG/M2 | DIASTOLIC BLOOD PRESSURE: 78 MMHG | TEMPERATURE: 98.7 F | RESPIRATION RATE: 16 BRPM | HEIGHT: 67 IN | OXYGEN SATURATION: 100 % | HEART RATE: 98 BPM | SYSTOLIC BLOOD PRESSURE: 100 MMHG

## 2021-08-13 DIAGNOSIS — I82.890 SUPERFICIAL VEIN THROMBOSIS: ICD-10-CM

## 2021-08-13 DIAGNOSIS — M79.601 PAIN OF RIGHT UPPER EXTREMITY: ICD-10-CM

## 2021-08-13 DIAGNOSIS — J04.0 ACUTE LARYNGITIS: ICD-10-CM

## 2021-08-13 LAB
INT CON NEG: NORMAL
INT CON POS: NORMAL
S PYO AG THROAT QL: NEGATIVE

## 2021-08-13 PROCEDURE — 87880 STREP A ASSAY W/OPTIC: CPT | Performed by: NURSE PRACTITIONER

## 2021-08-13 PROCEDURE — 93971 EXTREMITY STUDY: CPT | Mod: RT

## 2021-08-13 PROCEDURE — 99215 OFFICE O/P EST HI 40 MIN: CPT | Performed by: NURSE PRACTITIONER

## 2021-08-13 PROCEDURE — 87070 CULTURE OTHR SPECIMN AEROBIC: CPT

## 2021-08-13 RX ORDER — OXYCODONE HYDROCHLORIDE AND ACETAMINOPHEN 5; 325 MG/1; MG/1
TABLET ORAL
COMMUNITY
End: 2021-11-12

## 2021-08-13 RX ORDER — TRAMADOL HYDROCHLORIDE 50 MG/1
TABLET ORAL
COMMUNITY
End: 2021-10-31

## 2021-08-13 RX ORDER — FAMOTIDINE 20 MG/1
TABLET, FILM COATED ORAL
COMMUNITY
End: 2021-10-31

## 2021-08-13 RX ORDER — POLYETHYLENE GLYCOL 3350, SODIUM CHLORIDE, SODIUM BICARBONATE, POTASSIUM CHLORIDE 420; 11.2; 5.72; 1.48 G/4L; G/4L; G/4L; G/4L
POWDER, FOR SOLUTION ORAL
COMMUNITY
End: 2021-10-31

## 2021-08-13 RX ORDER — BUDESONIDE AND FORMOTEROL FUMARATE DIHYDRATE 160; 4.5 UG/1; UG/1
AEROSOL RESPIRATORY (INHALATION)
COMMUNITY
End: 2021-11-12

## 2021-08-13 RX ORDER — DOXYCYCLINE HYCLATE 100 MG
TABLET ORAL
COMMUNITY
End: 2021-08-13

## 2021-08-13 RX ORDER — HYDROXYCHLOROQUINE SULFATE 200 MG/1
200 TABLET, FILM COATED ORAL DAILY
COMMUNITY
End: 2021-10-29

## 2021-08-13 RX ORDER — OXYCODONE HYDROCHLORIDE 5 MG/1
TABLET ORAL
COMMUNITY
End: 2021-11-12

## 2021-08-13 RX ORDER — OMEPRAZOLE 40 MG/1
CAPSULE, DELAYED RELEASE ORAL
COMMUNITY
End: 2021-11-12

## 2021-08-13 RX ORDER — METOCLOPRAMIDE 10 MG/1
TABLET ORAL
COMMUNITY
End: 2021-10-31

## 2021-08-13 RX ORDER — CEFDINIR 300 MG/1
CAPSULE ORAL
COMMUNITY
End: 2021-08-13

## 2021-08-13 RX ORDER — FERROUS GLUCONATE 324(38)MG
TABLET ORAL
COMMUNITY
End: 2021-11-12

## 2021-08-13 RX ORDER — ATENOLOL 25 MG/1
25 TABLET ORAL EVERY EVENING
COMMUNITY
End: 2023-01-01

## 2021-08-13 RX ORDER — DAPSONE 100 MG/1
TABLET ORAL
COMMUNITY
End: 2021-10-29

## 2021-08-13 ASSESSMENT — FIBROSIS 4 INDEX: FIB4 SCORE: 0.78

## 2021-08-13 NOTE — PROGRESS NOTES
"Chief Complaint   Patient presents with   • Oral Swelling     lost voice x1 wk ago , swollen no paing       HISTORY OF PRESENT ILLNESS: Patient is a pleasant 25 y.o. female who presents to urgent care today with concerns of voice hoarseness.  His symptoms started proximally 1 week ago when she is in dialysis, the time her voice became hoarse.  She had a few days of an associated sore throat, the symptoms have since resolved.  She continues to have hoarseness.  States her throat feels \"dry\".  She denies any fever, chills, malaise, URI symptoms.  She has tried Tylenol without much improvement.  She was recently admitted to the hospital for anemia and GERD, denies any changes in her GERD medications, feels it is well controlled at this point.  She was evaluated in the emergency department for her sore throat several days ago, at that time a strep test was performed, resulted negative.  Furthermore, the patient reports 3 days of right generalized upper extremity pain.  She denies any injury or trauma, she is right-hand dominant.  She has fistulas to her right upper extremity.  She did receive dialysis today without complication.  She has not tried any medication for symptom relief.    Patient Active Problem List    Diagnosis Date Noted   • Hypokalemia 08/05/2021   • Syncope 08/05/2021   • Hemoptysis 03/28/2021   • Immunosuppression ([facility]) 03/27/2021   • Elevated INR 03/27/2021   • Chronic respiratory failure with hypoxia ([facility]) 03/20/2021   • Anemia associated with acute blood loss 03/19/2021   • Shortness of breath 03/19/2021   • Situational anxiety 02/17/2021   • Avascular necrosis ([facility]) 02/17/2021   • SIRS (systemic inflammatory response syndrome) ([facility]) 02/15/2021   • Back pain 02/06/2021   • Transaminitis 02/06/2021   • Renovascular hypertension 01/08/2021   • GIB (gastrointestinal bleeding) 11/11/2020   • Gastroesophageal reflux disease without esophagitis 10/14/2020   • Anemia due to chronic kidney disease " 10/13/2020   • Thrombocytopenia ([facility]) 09/16/2020   • Pancytopenia ([facility]) 07/01/2020   • Shingles 07/01/2020   • Gastritis 06/19/2020   • Chest pain 04/22/2020   • Preoperative clearance 04/22/2020   • Symptomatic anemia 01/26/2020   • Hyperkalemia 01/26/2020   • Hemorrhagic gastritis with hematemesis  12/09/2019   • Lupus ([facility]) 12/09/2019   • HTN (hypertension) 01/17/2018   • ESRD (end stage renal disease) on dialysis ([facility]) 10/04/2017   • Arteriovenous fistula, acquired ([facility]) 03/21/2017   • Osteonecrosis due to drugs, right femur ([facility]) 06/06/2015   • Under care of palliative care physician 06/02/2015   • Hypoalbuminemia 11/04/2012   • SLE glomerulonephritis syndrome ([facility]) 03/01/2012   • Chronic conjunctivitis 09/19/2011   • Blepharitis 09/19/2011       Allergies:Cephalexin, Clindamycin, Methylprednisolone, Metoprolol, Maxipime [cefepime], and Norco [hydrocodone-acetaminophen]    Current Outpatient Medications Ordered in Epic   Medication Sig Dispense Refill   • traMADol (ULTRAM) 50 MG Tab tramadol 50 mg tablet   TAKE 1 TABLET BY MOUTH EVERY 6 HOURS AS NEEDED GOT MODERATE PAIN FOR 14 DAYS     • polyethylene glycol-electrolytes (NULYTELY) 420 GM solution peg-electrolyte solution 420 gram oral solution   FOLLOW [facility] HANDOUT     • oxyCODONE-acetaminophen (PERCOCET) 5-325 MG Tab oxycodone-acetaminophen 5 mg-325 mg tablet   TAKE 1 TABLET BY MOUTH EVERY 4 HOURS AS NEEDED FOR UP TO 3 DAYS     • oxyCODONE immediate-release (ROXICODONE) 5 MG Tab oxycodone 5 mg tablet   TAKE 1 TABLET BY MOUTH EVERY 6 HOURS AS NEEDED FOR 5 DAYS     • omeprazole (PRILOSEC) 40 MG delayed-release capsule omeprazole 40 mg capsule,delayed release   TAKE 1 CAPSULE BY MOUTH ONCE DAILY FOR 18 DAYS     • metoclopramide (REGLAN) 10 MG Tab metoclopramide 10 mg tablet   TAKE 1 TABLET BY MOUTH THREE TIMES DAILY BEFORE MEAL(S) FOR 25 DAYS     • ferrous gluconate (FERGON) 324 (38 Fe) MG Tab ferrous gluconate 324 mg (38 mg iron) tablet   TAKE 1 TABLET  BY MOUTH THREE TIMES DAILY WITH MEALS     • famotidine (PEPCID) 20 MG Tab famotidine 20 mg tablet     • dapsone 100 MG Tab dapsone 100 mg tablet   TAKE 1 TABLET BY MOUTH ONCE DAILY FOR 30 DAYS     • atenolol (TENORMIN) 25 MG Tab atenolol 25 mg tablet   TAKE 1 TABLET BY MOUTH ONCE DAILY     • budesonide-formoterol (SYMBICORT) 160-4.5 MCG/ACT Aerosol Symbicort 160 mcg-4.5 mcg/actuation HFA aerosol inhaler     • PREDNISONE PO Take 5 mg by mouth every day.     • hydroxychloroquine (PLAQUENIL) 200 MG Tab Take 200 mg by mouth every day.     • ondansetron (ZOFRAN ODT) 4 MG TABLET DISPERSIBLE Take 1 tablet by mouth every four hours as needed for Nausea. 30 tablet 0   • sucralfate (CARAFATE) 1 GM Tab Take 1 tablet by mouth 4 Times a Day,Before Meals and at Bedtime. 120 tablet 0   • pantoprazole (PROTONIX) 40 MG Tablet Delayed Response Take 1 tablet by mouth 2 times a day. 30 tablet 3   • furosemide (LASIX) 80 MG Tab Take 3 Tablets by mouth 2 times a day. 30 tablet 0   • cloNIDine (CATAPRES) 0.2 MG/24HR PATCH WEEKLY patch Place 1 Patch on the skin every 7 days.     • predniSONE (DELTASONE) 5 MG Tab Take 5 mg by mouth every day.     • carvedilol (COREG) 25 MG Tab Take 1 tablet by mouth 2 times a day with meals. 60 tablet 0   • losartan (COZAAR) 25 MG Tab Take 1 tablet by mouth every day. 30 tablet 0   • amLODIPine (NORVASC) 10 MG Tab Take 1 tablet by mouth every evening. 90 tablet 3   • mycophenolate (MYFORTIC) 180 MG EC tablet Take 1 Tab by mouth every evening. 30 Tab 1   • hydroxychloroquine (PLAQUENIL) 200 MG Tab Take 200 mg by mouth every evening.       No current Epic-ordered facility-administered medications on file.       Past Medical History:   Diagnosis Date   • Anemia 01/17/2018   • AVF (arteriovenous fistula) ([facility])     Right Arm   • Chest pain    • Chest tightness    • Daytime sleepiness    • Dialysis patient ([facility])      dialysis, M,W,F Elizabeth/Joni   • Difficulty breathing    • ESRD (end stage renal disease) on  "dialysis ([facility]) 01/17/2018    Twan Fu   • Gasping for breath    • Heart burn    • Hypertension 01/17/2018    \"Controlled with medication\"   • Indigestion    • Lupus ([facility])    • Migraines 01/17/2018   • Painful breathing    • Palpitations    • Seizure ([facility]) 2013    from high blood pressure, reports 1 time event   • Shortness of breath    • Swelling of lower extremity    • Wheezing        Social History     Tobacco Use   • Smoking status: Never Smoker   • Smokeless tobacco: Never Used   Vaping Use   • Vaping Use: Never used   Substance Use Topics   • Alcohol use: No   • Drug use: No       Family Status   Relation Name Status   • PGMo  (Not Specified)     Family History   Problem Relation Age of Onset   • Diabetes Paternal Grandmother        ROS:  Review of Systems   Constitutional: Negative for fever, chills, weight loss, malaise, and fatigue.   HENT: Positive for recent sore throat, hoarse voice.  Negative for ear pain, nosebleeds, congestion, sore throat and neck pain.    Eyes: Negative for vision changes.   Neuro: Negative for headache, sensory changes, weakness, seizure, LOC.   Cardiovascular: Negative for chest pain, palpitations, orthopnea and leg swelling.   Respiratory: Negative for cough, sputum production, shortness of breath and wheezing.   Gastrointestinal: Negative for abdominal pain, nausea, vomiting or diarrhea.   Genitourinary: Negative for dysuria, urgency and frequency.  Musculoskeletal: Positive for right upper extremity pain.  Negative for falls, neck pain, back pain, joint pain, myalgias.   Skin: Negative for rash, diaphoresis.     Exam:  /78 (BP Location: Left arm, Patient Position: Sitting, BP Cuff Size: Adult)   Pulse 98   Temp 37.1 °C (98.7 °F) (Temporal)   Resp 16   Ht 1.702 m (5' 7\")   Wt 50.8 kg (112 lb)   SpO2 100%   General: well-nourished, well-developed female in NAD  Head: normocephalic, atraumatic  Eyes: PERRLA, no conjunctival injection, acuity grossly intact, lids " normal.  Ears: normal shape and symmetry, no tenderness, no discharge. External canals are without any significant edema or erythema. Tympanic membranes are without any inflammation, no effusion. Gross auditory acuity is intact.  Nose: symmetrical without tenderness, no discharge.  Mouth/Throat: reasonable hygiene, no erythema, exudates or tonsillar enlargement.  Neck: no masses, range of motion within normal limits, no tracheal deviation. No obvious thyroid enlargement.   Lymph: + cervical adenopathy. No supraclavicular adenopathy.   Neuro: alert and oriented. Cranial nerves 1-12 grossly intact. No sensory deficit.   Cardiovascular: regular rate and rhythm. No edema.  Pulmonary: no distress. Chest is symmetrical with respiration, no wheezes, crackles, or rhonchi.   Abdomen: soft, non-tender, no guarding, no hepatosplenomegaly.  Musculoskeletal: no clubbing, appropriate muscle tone, gait is stable.  Right upper extremity: There is AV fistula to upper extremity with no surrounding swelling or tenderness.  There is soft tissue tenderness noted to before meals and proximal forearm, pain exacerbated with range of motion of the elbow, distal neurovascular is intact, cap refills brisk.  Skin: warm, dry, intact, no clubbing, no cyanosis, no rashes.   Psych: appropriate mood, affect, judgement.       POC strep negative            Assessment/Plan:  1. Acute laryngitis  POCT Rapid Strep A    CULTURE THROAT   2. Superficial vein thrombosis  US-EXTREMITY VENOUS UPPER UNILAT RIGHT    REFERRAL TO VASCULAR SURGERY         Patient is a pleasant 25-year-old female presents the urgent care today with ongoing hoarse voice as well as new onset of right upper extremity pain.  Strep is negative in clinic today, culture is performed, suspect either viral and/or irritation secondary to GERD.  She is instructed to continue her current medications as directed, warm salt water gargles encouraged, rest.  Regarding her right upper extremity  pain, pain is atraumatic.  She does have had fistula to this area, and ultrasound is ordered, I will call the patient later today with results.  Supportive care, differential diagnoses, and indications for immediate follow-up discussed with patient.   Pathogenesis of diagnosis discussed including typical length and natural progression.   Instructed to return to clinic or nearest emergency department for any change in condition, further concerns, or worsening of symptoms.  Patient states understanding of the plan of care and discharge instructions.  Instructed to make an appointment, for follow up, with her primary care provider.      Previous ED visit encounter reviewed and considered in medical decision making today. I spent a total of 40 minutes with record review, exam, communication with the patient and other providers, and documentation of this encounter.  Please note that this dictation was created using voice recognition software. I have made every reasonable attempt to correct obvious errors, but I expect that there are errors of grammar and possibly content that I did not discover before finalizing the note.      JOSE EDUARDO Prater.

## 2021-08-15 LAB
BACTERIA SPEC RESP CULT: NORMAL
SIGNIFICANT IND 70042: NORMAL
SITE SITE: NORMAL
SOURCE SOURCE: NORMAL

## 2021-08-17 ENCOUNTER — HOSPITAL ENCOUNTER (EMERGENCY)
Facility: MEDICAL CENTER | Age: 26
End: 2021-08-18
Attending: EMERGENCY MEDICINE
Payer: COMMERCIAL

## 2021-08-17 DIAGNOSIS — J02.9 PHARYNGITIS, UNSPECIFIED ETIOLOGY: ICD-10-CM

## 2021-08-17 DIAGNOSIS — R42 DIZZINESS: ICD-10-CM

## 2021-08-17 LAB — EKG IMPRESSION: NORMAL

## 2021-08-17 PROCEDURE — 85025 COMPLETE CBC W/AUTO DIFF WBC: CPT

## 2021-08-17 PROCEDURE — 80048 BASIC METABOLIC PNL TOTAL CA: CPT

## 2021-08-17 PROCEDURE — 99283 EMERGENCY DEPT VISIT LOW MDM: CPT

## 2021-08-17 PROCEDURE — 93005 ELECTROCARDIOGRAM TRACING: CPT

## 2021-08-17 PROCEDURE — 84703 CHORIONIC GONADOTROPIN ASSAY: CPT

## 2021-08-17 PROCEDURE — 93005 ELECTROCARDIOGRAM TRACING: CPT | Performed by: EMERGENCY MEDICINE

## 2021-08-17 PROCEDURE — 0241U HCHG SARS-COV-2 COVID-19 NFCT DS RESP RNA 4 TRGT MIC: CPT

## 2021-08-17 ASSESSMENT — PAIN DESCRIPTION - PAIN TYPE: TYPE: ACUTE PAIN

## 2021-08-17 ASSESSMENT — FIBROSIS 4 INDEX: FIB4 SCORE: 0.78

## 2021-08-18 ENCOUNTER — HOSPITAL ENCOUNTER (OUTPATIENT)
Dept: RADIOLOGY | Facility: MEDICAL CENTER | Age: 26
End: 2021-08-18
Attending: EMERGENCY MEDICINE
Payer: COMMERCIAL

## 2021-08-18 VITALS
DIASTOLIC BLOOD PRESSURE: 105 MMHG | TEMPERATURE: 96.9 F | RESPIRATION RATE: 22 BRPM | OXYGEN SATURATION: 100 % | WEIGHT: 117.06 LBS | BODY MASS INDEX: 18.37 KG/M2 | SYSTOLIC BLOOD PRESSURE: 170 MMHG | HEIGHT: 67 IN | HEART RATE: 75 BPM

## 2021-08-18 LAB
ANION GAP SERPL CALC-SCNC: 11 MMOL/L (ref 7–16)
BASOPHILS # BLD AUTO: 0.6 % (ref 0–1.8)
BASOPHILS # BLD: 0.03 K/UL (ref 0–0.12)
BUN SERPL-MCNC: 42 MG/DL (ref 8–22)
CALCIUM SERPL-MCNC: 9.3 MG/DL (ref 8.4–10.2)
CHLORIDE SERPL-SCNC: 93 MMOL/L (ref 96–112)
CO2 SERPL-SCNC: 29 MMOL/L (ref 20–33)
COMMENT 1642: NORMAL
CREAT SERPL-MCNC: 5.27 MG/DL (ref 0.5–1.4)
EOSINOPHIL # BLD AUTO: 0.03 K/UL (ref 0–0.51)
EOSINOPHIL NFR BLD: 0.6 % (ref 0–6.9)
ERYTHROCYTE [DISTWIDTH] IN BLOOD BY AUTOMATED COUNT: 61.1 FL (ref 35.9–50)
FLUAV RNA SPEC QL NAA+PROBE: NEGATIVE
FLUBV RNA SPEC QL NAA+PROBE: NEGATIVE
GLUCOSE SERPL-MCNC: 91 MG/DL (ref 65–99)
HCG SERPL QL: NEGATIVE
HCT VFR BLD AUTO: 27.6 % (ref 37–47)
HGB BLD-MCNC: 8.2 G/DL (ref 12–16)
HYPOCHROMIA BLD QL SMEAR: ABNORMAL
IMM GRANULOCYTES # BLD AUTO: 0.09 K/UL (ref 0–0.11)
IMM GRANULOCYTES NFR BLD AUTO: 1.7 % (ref 0–0.9)
LYMPHOCYTES # BLD AUTO: 0.54 K/UL (ref 1–4.8)
LYMPHOCYTES NFR BLD: 10.4 % (ref 22–41)
MCH RBC QN AUTO: 27.9 PG (ref 27–33)
MCHC RBC AUTO-ENTMCNC: 29.7 G/DL (ref 33.6–35)
MCV RBC AUTO: 93.9 FL (ref 81.4–97.8)
MONOCYTES # BLD AUTO: 0.26 K/UL (ref 0–0.85)
MONOCYTES NFR BLD AUTO: 5 % (ref 0–13.4)
NEUTROPHILS # BLD AUTO: 4.25 K/UL (ref 2–7.15)
NEUTROPHILS NFR BLD: 81.7 % (ref 44–72)
NRBC # BLD AUTO: 0 K/UL
NRBC BLD-RTO: 0 /100 WBC
PLATELET # BLD AUTO: 186 K/UL (ref 164–446)
PLATELET BLD QL SMEAR: NORMAL
PMV BLD AUTO: 10.2 FL (ref 9–12.9)
POTASSIUM SERPL-SCNC: 5.2 MMOL/L (ref 3.6–5.5)
RBC # BLD AUTO: 2.94 M/UL (ref 4.2–5.4)
RBC BLD AUTO: PRESENT
RSV RNA SPEC QL NAA+PROBE: NEGATIVE
SARS-COV-2 RNA RESP QL NAA+PROBE: NOTDETECTED
SODIUM SERPL-SCNC: 133 MMOL/L (ref 135–145)
SPECIMEN SOURCE: NORMAL
WBC # BLD AUTO: 5.2 K/UL (ref 4.8–10.8)

## 2021-08-18 PROCEDURE — 96372 THER/PROPH/DIAG INJ SC/IM: CPT

## 2021-08-18 PROCEDURE — 700111 HCHG RX REV CODE 636 W/ 250 OVERRIDE (IP): Performed by: EMERGENCY MEDICINE

## 2021-08-18 PROCEDURE — 71045 X-RAY EXAM CHEST 1 VIEW: CPT

## 2021-08-18 RX ORDER — HYDROCODONE BITARTRATE AND ACETAMINOPHEN 5; 325 MG/1; MG/1
1 TABLET ORAL ONCE
Status: DISCONTINUED | OUTPATIENT
Start: 2021-08-18 | End: 2021-08-18 | Stop reason: HOSPADM

## 2021-08-18 RX ORDER — HYDROMORPHONE HYDROCHLORIDE 1 MG/ML
0.5 INJECTION, SOLUTION INTRAMUSCULAR; INTRAVENOUS; SUBCUTANEOUS ONCE
Status: COMPLETED | OUTPATIENT
Start: 2021-08-18 | End: 2021-08-18

## 2021-08-18 RX ADMIN — HYDROMORPHONE HYDROCHLORIDE 0.5 MG: 1 INJECTION, SOLUTION INTRAMUSCULAR; INTRAVENOUS; SUBCUTANEOUS at 01:34

## 2021-08-18 ASSESSMENT — PAIN DESCRIPTION - PAIN TYPE
TYPE: ACUTE PAIN
TYPE: ACUTE PAIN

## 2021-08-18 NOTE — ED TRIAGE NOTES
Pt feeling lightheaded and dizzy with ambulation, also reports sore throat (has been tested 2x for strep in 2 weeks) and intermittent bloody nose over the last week.    Pt ambulatory with steady gait, on home oxygen concentrator, speaking in full sentences.     EKG in triage.     Last dialysis yesterday

## 2021-08-18 NOTE — ED NOTES
Blood samples collected via butterfly to L hand. Covid swab collected. All samples delivered to lab.

## 2021-08-18 NOTE — ED NOTES
Discharge instructions reviewed with patient. AVS signed by patient. No PIV present. No new prescriptions. Patient understands need for follow-up appointment with primary care team and to return to ED for worsening symptoms. All questions answered at this time. Patient ambulated to exit with belongings. Patient in stable condition with no signs of distress. Patient agreeable to discharge instructions.

## 2021-08-18 NOTE — ED PROVIDER NOTES
"ED Provider Note    ER Provider Note         CHIEF COMPLAINT  Chief Complaint   Patient presents with   • Dizziness     \"spinning\"; just with ambulation   • Sore Throat     just with ambulation       HPI  Lily Nicole is a 25 y.o. female who presents to the Emergency Department with continued sore throat as well as now some mild cough and some mild dizziness with ambulation.  She had a rapid strep that was done on the eighth.  She denies any fevers or chills at this time.  She says she feels intermittently nauseous.  She does have intermittent abdominal pain that is mild and more queasy however she has no pain currently.  She denies any unilateral symptoms she denies any difficulty with swallowing water at this point and says that her sore throat is mildly better than it was when 1 week ago.  There are no aggravating or alleviating factors.    REVIEW OF SYSTEMS  See HPI for further details. All other systems are negative.     PAST MEDICAL HISTORY   has a past medical history of Anemia (01/17/2018), AVF (arteriovenous fistula) ([facility]), Chest pain, Chest tightness, Daytime sleepiness, Dialysis patient ([facility]), Difficulty breathing, ESRD (end stage renal disease) on dialysis ([facility]) (01/17/2018), Gasping for breath, Heart burn, Hypertension (01/17/2018), Indigestion, Lupus ([facility]), Migraines (01/17/2018), Painful breathing, Palpitations, Seizure ([facility]) (2013), Shortness of breath, Swelling of lower extremity, and Wheezing.    SURGICAL HISTORY   has a past surgical history that includes ronak by laparoscopy (4/5/2010); av fistula creation (Right); angioplasty (01/17/2018); other; other; gastroscopy-endo (12/9/2019); gastroscopy (N/A, 5/30/2020); gastroscopy-endo (9/18/2020); upper gi endoscopy,ctrl bleed (11/12/2020); upper gi endoscopy,biopsy (11/12/2020); gastroscopy-endo (11/12/2020); colonoscopy,diagnostic (1/8/2021); upper gi endoscopy,diagnosis (1/8/2021); upper gi endoscopy,ctrl bleed (1/8/2021); upper gi " endoscopy,diagnosis (3/5/2021); upper gi endoscopy,diagnosis (N/A, 3/19/2021); upper gi endoscopy,ctrl bleed (3/19/2021); and bronchoscopy,diagnostic (Bilateral, 5/13/2021).    SOCIAL HISTORY  Social History     Tobacco Use   • Smoking status: Never Smoker   • Smokeless tobacco: Never Used   Vaping Use   • Vaping Use: Never used   Substance Use Topics   • Alcohol use: No   • Drug use: No      Social History     Substance and Sexual Activity   Drug Use No       FAMILY HISTORY  Family History   Problem Relation Age of Onset   • Diabetes Paternal Grandmother        CURRENT MEDICATIONS  Home Medications     Reviewed by Deandra Boudreaux R.N. (Registered Nurse) on 08/17/21 at 2033  Med List Status: Partial   Medication Last Dose Status   amLODIPine (NORVASC) 10 MG Tab  Active   atenolol (TENORMIN) 25 MG Tab  Active   budesonide-formoterol (SYMBICORT) 160-4.5 MCG/ACT Aerosol  Active   carvedilol (COREG) 25 MG Tab  Active   cloNIDine (CATAPRES) 0.2 MG/24HR PATCH WEEKLY patch  Active   dapsone 100 MG Tab  Active   famotidine (PEPCID) 20 MG Tab  Active   ferrous gluconate (FERGON) 324 (38 Fe) MG Tab  Active   furosemide (LASIX) 80 MG Tab  Active   hydroxychloroquine (PLAQUENIL) 200 MG Tab  Active   hydroxychloroquine (PLAQUENIL) 200 MG Tab  Active   losartan (COZAAR) 25 MG Tab  Active   metoclopramide (REGLAN) 10 MG Tab  Active   mycophenolate (MYFORTIC) 180 MG EC tablet  Active   omeprazole (PRILOSEC) 40 MG delayed-release capsule  Active   ondansetron (ZOFRAN ODT) 4 MG TABLET DISPERSIBLE  Active   oxyCODONE immediate-release (ROXICODONE) 5 MG Tab  Active   oxyCODONE-acetaminophen (PERCOCET) 5-325 MG Tab  Active   pantoprazole (PROTONIX) 40 MG Tablet Delayed Response  Active   polyethylene glycol-electrolytes (NULYTELY) 420 GM solution  Active   predniSONE (DELTASONE) 5 MG Tab  Active   PREDNISONE PO  Active   sucralfate (CARAFATE) 1 GM Tab  Active   traMADol (ULTRAM) 50 MG Tab  Active                ALLERGIES  Allergies  "  Allergen Reactions   • Cephalexin Rash     .   • Clindamycin Rash     .   • Methylprednisolone Unspecified     Anxious   • Metoprolol Rash     .   • Maxipime [Cefepime] Itching   • Norco [Hydrocodone-Acetaminophen] Rash and Nausea     Generalized rash       PHYSICAL EXAM  VITAL SIGNS: BP (!) 170/105   Pulse 75   Temp 36.1 °C (96.9 °F) (Temporal)   Resp (!) 22 Comment: on oxygen 3L  Ht 1.702 m (5' 7\")   Wt 53.1 kg (117 lb 1 oz)   LMP 08/02/2021   SpO2 100%   BMI 18.33 kg/m²      Constitutional: Alert in no apparent distress.  HENT: No signs of trauma, Bilateral external ears normal, Nose normal.   Eyes: Pupils are equal and reactive, Conjunctiva normal, Non-icteric.   Neck: Normal range of motion, No tenderness, Supple, No stridor.   Lymphatic: No lymphadenopathy noted.   Cardiovascular: Regular rate and rhythm, nondisplaced PMI  Thorax & Lungs: No respiratory distress,  No chest tenderness.   Abdomen: Bowel sounds normal, Soft, No tenderness, No masses, No pulsatile masses. No peritoneal signs.  Skin: Warm, Dry, chronic skin changes noted  Back: No bony tenderness, No CVA tenderness.   Extremities: Intact distal pulses, No edema, No tenderness, No cyanosis.  Musculoskeletal: Good range of motion in all major joints. No tenderness to palpation or major deformities noted.   Neurologic: Alert , Normal motor function, Normal sensory function, No focal deficits noted.   Psychiatric: Affect normal, Judgment normal, Mood normal.     DIAGNOSTIC STUDIES / PROCEDURES    EKG Interpretation:  Interpreted by me  No ST-T wave changes and no ectopy with no Brugada syndrome or any hypertrophic cardiomyopathy and no preexcitation and normal intervals         LABS  Labs Reviewed   CBC WITH DIFFERENTIAL - Abnormal; Notable for the following components:       Result Value    RBC 2.94 (*)     Hemoglobin 8.2 (*)     Hematocrit 27.6 (*)     MCHC 29.7 (*)     RDW 61.1 (*)     Neutrophils-Polys 81.70 (*)     Lymphocytes 10.40 (*)  "    Immature Granulocytes 1.70 (*)     Lymphs (Absolute) 0.54 (*)     All other components within normal limits   BASIC METABOLIC PANEL - Abnormal; Notable for the following components:    Sodium 133 (*)     Chloride 93 (*)     Bun 42 (*)     Creatinine 5.27 (*)     All other components within normal limits   ESTIMATED GFR - Abnormal; Notable for the following components:    GFR If  12 (*)     GFR If Non  10 (*)     All other components within normal limits   COV-2, FLU A/B, AND RSV BY PCR    Narrative:     Have you been in close contact with a person who is suspected  or known to be positive for COVID-19 within the last 30 days  (e.g. last seen that person < 30 days ago)->Unknown   HCG QUAL SERUM   PLATELET ESTIMATE   MORPHOLOGY   DIFFERENTIAL COMMENT       All labs reviewed by me.    RADIOLOGY  DX-CHEST-PORTABLE (1 VIEW)   Final Result         1. No acute cardiopulmonary abnormalities are identified.           The radiologist's interpretation of all radiological studies have been reviewed by me.    COURSE & MEDICAL DECISION MAKING  Pertinent Labs & Imaging studies reviewed. (See chart for details)    This is a 25 y.o. female that presents with symptoms concerning for Covid.  It does appear that her sore throat is improving.  I will evaluate her for electrolyte derangements as well as for anemia given her dizziness.  We will also get a screening EKG.  I will chest her for pregnancy and then reassess..     11:37 PM - Patient seen and examined at bedside.  EKG is normal.  The patient chest x-ray is normal.  Patient has no significant electrolyte derangements and creatinine and BUN are elevated however this is normal for her.  She is not severely anemic and is at baseline.  Her Covid is negative.  At this time we will discharge her home.  I believe the symptoms are likely chronic.          FINAL IMPRESSION  1. Dizziness    2. Pharyngitis, unspecified etiology              Electronically  signed by: Rivera Fink M.D., 8/17/2021

## 2021-08-26 ENCOUNTER — OUTPATIENT INFUSION SERVICES (OUTPATIENT)
Dept: ONCOLOGY | Facility: MEDICAL CENTER | Age: 26
End: 2021-08-26
Attending: INTERNAL MEDICINE
Payer: COMMERCIAL

## 2021-08-26 VITALS
TEMPERATURE: 98 F | WEIGHT: 116.84 LBS | SYSTOLIC BLOOD PRESSURE: 131 MMHG | HEIGHT: 66 IN | RESPIRATION RATE: 18 BRPM | BODY MASS INDEX: 18.78 KG/M2 | HEART RATE: 88 BPM | DIASTOLIC BLOOD PRESSURE: 86 MMHG | OXYGEN SATURATION: 98 %

## 2021-08-26 DIAGNOSIS — D64.9 SYMPTOMATIC ANEMIA: ICD-10-CM

## 2021-08-26 LAB
ABO GROUP BLD: NORMAL
ANISOCYTOSIS BLD QL SMEAR: ABNORMAL
BARCODED ABORH UBTYP: 5100
BARCODED PRD CODE UBPRD: NORMAL
BARCODED UNIT NUM UBUNT: NORMAL
BASOPHILS # BLD AUTO: 4.5 % (ref 0–1.8)
BASOPHILS # BLD: 0.14 K/UL (ref 0–0.12)
BLD GP AB SCN SERPL QL: NORMAL
COMPONENT R 8504R: NORMAL
EOSINOPHIL # BLD AUTO: 0 K/UL (ref 0–0.51)
EOSINOPHIL NFR BLD: 0 % (ref 0–6.9)
ERYTHROCYTE [DISTWIDTH] IN BLOOD BY AUTOMATED COUNT: 61.9 FL (ref 35.9–50)
HCT VFR BLD AUTO: 26.1 % (ref 37–47)
HGB BLD-MCNC: 7.7 G/DL (ref 12–16)
LYMPHOCYTES # BLD AUTO: 0.36 K/UL (ref 1–4.8)
LYMPHOCYTES NFR BLD: 11.6 % (ref 22–41)
MANUAL DIFF BLD: NORMAL
MCH RBC QN AUTO: 27.8 PG (ref 27–33)
MCHC RBC AUTO-ENTMCNC: 29.5 G/DL (ref 33.6–35)
MCV RBC AUTO: 94.2 FL (ref 81.4–97.8)
MONOCYTES # BLD AUTO: 0.08 K/UL (ref 0–0.85)
MONOCYTES NFR BLD AUTO: 2.7 % (ref 0–13.4)
MORPHOLOGY BLD-IMP: NORMAL
NEUTROPHILS # BLD AUTO: 2.52 K/UL (ref 2–7.15)
NEUTROPHILS NFR BLD: 81.2 % (ref 44–72)
NRBC # BLD AUTO: 0 K/UL
NRBC BLD-RTO: 0 /100 WBC
PLATELET # BLD AUTO: 212 K/UL (ref 164–446)
PLATELET BLD QL SMEAR: NORMAL
PMV BLD AUTO: 9.7 FL (ref 9–12.9)
POIKILOCYTOSIS BLD QL SMEAR: NORMAL
PRODUCT TYPE UPROD: NORMAL
RBC # BLD AUTO: 2.77 M/UL (ref 4.2–5.4)
RBC BLD AUTO: PRESENT
RH BLD: NORMAL
UNIT STATUS USTAT: NORMAL
WBC # BLD AUTO: 3.1 K/UL (ref 4.8–10.8)

## 2021-08-26 PROCEDURE — 86922 COMPATIBILITY TEST ANTIGLOB: CPT | Mod: 91

## 2021-08-26 PROCEDURE — 86850 RBC ANTIBODY SCREEN: CPT

## 2021-08-26 PROCEDURE — 85007 BL SMEAR W/DIFF WBC COUNT: CPT

## 2021-08-26 PROCEDURE — 36415 COLL VENOUS BLD VENIPUNCTURE: CPT

## 2021-08-26 PROCEDURE — 86922 COMPATIBILITY TEST ANTIGLOB: CPT

## 2021-08-26 PROCEDURE — 86900 BLOOD TYPING SEROLOGIC ABO: CPT

## 2021-08-26 PROCEDURE — 86901 BLOOD TYPING SEROLOGIC RH(D): CPT

## 2021-08-26 PROCEDURE — 85027 COMPLETE CBC AUTOMATED: CPT

## 2021-08-26 ASSESSMENT — FIBROSIS 4 INDEX: FIB4 SCORE: 0.75

## 2021-08-26 NOTE — PROGRESS NOTES
Pt arrived to [facility], ambulatory, for labs. Pt c/o hoarseness, she has seen multiple MDs and was evaluated in the ER for this problem with no resolution. Pt states it seems to be slowly improving. Labs drawn with 23g butterfly needle in the L-wrist. Pt left [facility] with no s/sx of distress. Follow up appointment confirmed for blood transfusion on Saturday.

## 2021-08-27 PROCEDURE — 86902 BLOOD TYPE ANTIGEN DONOR EA: CPT | Mod: 91

## 2021-08-28 ENCOUNTER — OUTPATIENT INFUSION SERVICES (OUTPATIENT)
Dept: ONCOLOGY | Facility: MEDICAL CENTER | Age: 26
End: 2021-08-28
Attending: INTERNAL MEDICINE
Payer: COMMERCIAL

## 2021-08-28 VITALS
BODY MASS INDEX: 18.18 KG/M2 | SYSTOLIC BLOOD PRESSURE: 154 MMHG | OXYGEN SATURATION: 100 % | HEART RATE: 73 BPM | WEIGHT: 113.1 LBS | RESPIRATION RATE: 16 BRPM | TEMPERATURE: 98.9 F | HEIGHT: 66 IN | DIASTOLIC BLOOD PRESSURE: 94 MMHG

## 2021-08-28 DIAGNOSIS — D64.9 ACUTE ON CHRONIC ANEMIA: ICD-10-CM

## 2021-08-28 DIAGNOSIS — D64.9 SYMPTOMATIC ANEMIA: ICD-10-CM

## 2021-08-28 PROCEDURE — A9270 NON-COVERED ITEM OR SERVICE: HCPCS | Performed by: INTERNAL MEDICINE

## 2021-08-28 PROCEDURE — P9016 RBC LEUKOCYTES REDUCED: HCPCS

## 2021-08-28 PROCEDURE — 86945 BLOOD PRODUCT/IRRADIATION: CPT

## 2021-08-28 PROCEDURE — 700105 HCHG RX REV CODE 258: Performed by: INTERNAL MEDICINE

## 2021-08-28 PROCEDURE — 306780 HCHG STAT FOR TRANSFUSION PER CASE

## 2021-08-28 PROCEDURE — 96374 THER/PROPH/DIAG INJ IV PUSH: CPT

## 2021-08-28 PROCEDURE — 700102 HCHG RX REV CODE 250 W/ 637 OVERRIDE(OP): Performed by: INTERNAL MEDICINE

## 2021-08-28 PROCEDURE — 700111 HCHG RX REV CODE 636 W/ 250 OVERRIDE (IP): Performed by: INTERNAL MEDICINE

## 2021-08-28 PROCEDURE — 36430 TRANSFUSION BLD/BLD COMPNT: CPT

## 2021-08-28 RX ORDER — 0.9 % SODIUM CHLORIDE 0.9 %
3 VIAL (ML) INJECTION PRN
Status: CANCELLED | OUTPATIENT
Start: 2021-08-28

## 2021-08-28 RX ORDER — ACETAMINOPHEN 325 MG/1
650 TABLET ORAL ONCE
Status: COMPLETED | OUTPATIENT
Start: 2021-08-28 | End: 2021-08-28

## 2021-08-28 RX ORDER — ACETAMINOPHEN 325 MG/1
650 TABLET ORAL PRN
Status: CANCELLED | OUTPATIENT
Start: 2021-08-28

## 2021-08-28 RX ORDER — 0.9 % SODIUM CHLORIDE 0.9 %
10 VIAL (ML) INJECTION PRN
Status: CANCELLED | OUTPATIENT
Start: 2021-08-28

## 2021-08-28 RX ORDER — DIPHENHYDRAMINE HCL 25 MG
25 TABLET ORAL ONCE
Status: CANCELLED | OUTPATIENT
Start: 2021-08-28 | End: 2021-08-28

## 2021-08-28 RX ORDER — DIPHENHYDRAMINE HCL 25 MG
25 TABLET ORAL ONCE
Status: DISCONTINUED | OUTPATIENT
Start: 2021-08-28 | End: 2021-08-28

## 2021-08-28 RX ORDER — ACETAMINOPHEN 325 MG/1
650 TABLET ORAL ONCE
Status: CANCELLED | OUTPATIENT
Start: 2021-08-28

## 2021-08-28 RX ORDER — DIPHENHYDRAMINE HYDROCHLORIDE 50 MG/ML
25 INJECTION INTRAMUSCULAR; INTRAVENOUS PRN
Status: CANCELLED | OUTPATIENT
Start: 2021-08-28

## 2021-08-28 RX ORDER — HEPARIN SODIUM (PORCINE) LOCK FLUSH IV SOLN 100 UNIT/ML 100 UNIT/ML
500 SOLUTION INTRAVENOUS PRN
Status: CANCELLED | OUTPATIENT
Start: 2021-08-28

## 2021-08-28 RX ORDER — SODIUM CHLORIDE 9 MG/ML
INJECTION, SOLUTION INTRAVENOUS CONTINUOUS
Status: CANCELLED | OUTPATIENT
Start: 2021-08-28

## 2021-08-28 RX ORDER — 0.9 % SODIUM CHLORIDE 0.9 %
VIAL (ML) INJECTION PRN
Status: CANCELLED | OUTPATIENT
Start: 2021-08-28

## 2021-08-28 RX ADMIN — ACETAMINOPHEN 650 MG: 325 TABLET ORAL at 10:31

## 2021-08-28 RX ADMIN — DIPHENHYDRAMINE HYDROCHLORIDE 25 MG: 50 INJECTION, SOLUTION INTRAMUSCULAR; INTRAVENOUS at 10:31

## 2021-08-28 ASSESSMENT — PAIN DESCRIPTION - PAIN TYPE: TYPE: ACUTE PAIN

## 2021-08-28 ASSESSMENT — FIBROSIS 4 INDEX: FIB4 SCORE: 0.66

## 2021-08-28 NOTE — PROGRESS NOTES
Lily arrives for blood transfusion today. Lily had CBC and COD done prior. Blood transfusion consents signed. Potential side effects reviewed. PIV started. 1 units PRBC's transfused without incident. Lily tolerated well. No signs or symptoms of adverse reaction observed or expressed. Lily rested comfortably during treatment. Next appointment scheduled, Lily discharged home to self care.

## 2021-09-08 NOTE — PROGRESS NOTES
2 RN skin assessment done; skin not WDL. Pt with scattered scabs and acne like rash over entire body.   Quality 128: Preventive Care And Screening: Body Mass Index (Bmi) Screening And Follow-Up Plan: BMI documented as out of range, follow up plan not documented, reason not otherwise specified. Detail Level: Zone Quality 110: Preventive Care And Screening: Influenza Immunization: Influenza Immunization not Administered because Patient Refused. Quality 226: Preventive Care And Screening: Tobacco Use: Screening And Cessation Intervention: Patient screened for tobacco use and is an ex/non-smoker Quality 130: Documentation Of Current Medications In The Medical Record: Current Medications Documented

## 2021-09-17 ENCOUNTER — OUTPATIENT INFUSION SERVICES (OUTPATIENT)
Dept: ONCOLOGY | Facility: MEDICAL CENTER | Age: 26
End: 2021-09-17
Attending: INTERNAL MEDICINE
Payer: COMMERCIAL

## 2021-09-17 VITALS
TEMPERATURE: 97.9 F | SYSTOLIC BLOOD PRESSURE: 122 MMHG | DIASTOLIC BLOOD PRESSURE: 79 MMHG | RESPIRATION RATE: 17 BRPM | HEART RATE: 78 BPM

## 2021-09-17 DIAGNOSIS — D64.9 SYMPTOMATIC ANEMIA: ICD-10-CM

## 2021-09-17 LAB
ABO GROUP BLD: NORMAL
ANISOCYTOSIS BLD QL SMEAR: ABNORMAL
BASOPHILS # BLD AUTO: 0.9 % (ref 0–1.8)
BASOPHILS # BLD: 0.04 K/UL (ref 0–0.12)
BLD GP AB SCN SERPL QL: NORMAL
EOSINOPHIL # BLD AUTO: 0 K/UL (ref 0–0.51)
EOSINOPHIL NFR BLD: 0 % (ref 0–6.9)
ERYTHROCYTE [DISTWIDTH] IN BLOOD BY AUTOMATED COUNT: 76.8 FL (ref 35.9–50)
HCT VFR BLD AUTO: 28.2 % (ref 37–47)
HGB BLD-MCNC: 8.3 G/DL (ref 12–16)
HYPOCHROMIA BLD QL SMEAR: ABNORMAL
LYMPHOCYTES # BLD AUTO: 0.33 K/UL (ref 1–4.8)
LYMPHOCYTES NFR BLD: 7.1 % (ref 22–41)
MACROCYTES BLD QL SMEAR: ABNORMAL
MANUAL DIFF BLD: NORMAL
MCH RBC QN AUTO: 26.6 PG (ref 27–33)
MCHC RBC AUTO-ENTMCNC: 29.4 G/DL (ref 33.6–35)
MCV RBC AUTO: 90.4 FL (ref 81.4–97.8)
MONOCYTES # BLD AUTO: 0.25 K/UL (ref 0–0.85)
MONOCYTES NFR BLD AUTO: 5.3 % (ref 0–13.4)
MORPHOLOGY BLD-IMP: NORMAL
MYELOCYTES NFR BLD MANUAL: 0.9 %
NEUTROPHILS # BLD AUTO: 4.03 K/UL (ref 2–7.15)
NEUTROPHILS NFR BLD: 85.8 % (ref 44–72)
NRBC # BLD AUTO: 0 K/UL
NRBC BLD-RTO: 0 /100 WBC
PLATELET # BLD AUTO: 272 K/UL (ref 164–446)
PLATELET BLD QL SMEAR: NORMAL
PMV BLD AUTO: 10.2 FL (ref 9–12.9)
POLYCHROMASIA BLD QL SMEAR: NORMAL
RBC # BLD AUTO: 3.12 M/UL (ref 4.2–5.4)
RBC BLD AUTO: PRESENT
RH BLD: NORMAL
WBC # BLD AUTO: 4.7 K/UL (ref 4.8–10.8)

## 2021-09-17 PROCEDURE — 36415 COLL VENOUS BLD VENIPUNCTURE: CPT

## 2021-09-17 PROCEDURE — 86900 BLOOD TYPING SEROLOGIC ABO: CPT

## 2021-09-17 PROCEDURE — 86850 RBC ANTIBODY SCREEN: CPT

## 2021-09-17 PROCEDURE — 85007 BL SMEAR W/DIFF WBC COUNT: CPT

## 2021-09-17 PROCEDURE — 85027 COMPLETE CBC AUTOMATED: CPT

## 2021-09-17 PROCEDURE — 86901 BLOOD TYPING SEROLOGIC RH(D): CPT

## 2021-09-17 RX ORDER — 0.9 % SODIUM CHLORIDE 0.9 %
10 VIAL (ML) INJECTION PRN
Status: CANCELLED | OUTPATIENT
Start: 2021-09-17

## 2021-09-17 RX ORDER — HEPARIN SODIUM (PORCINE) LOCK FLUSH IV SOLN 100 UNIT/ML 100 UNIT/ML
500 SOLUTION INTRAVENOUS PRN
Status: CANCELLED | OUTPATIENT
Start: 2021-09-17

## 2021-09-17 RX ORDER — DIPHENHYDRAMINE HCL 25 MG
25 TABLET ORAL ONCE
Status: CANCELLED | OUTPATIENT
Start: 2021-09-17 | End: 2021-09-17

## 2021-09-17 RX ORDER — ACETAMINOPHEN 325 MG/1
650 TABLET ORAL PRN
Status: CANCELLED | OUTPATIENT
Start: 2021-09-17

## 2021-09-17 RX ORDER — ACETAMINOPHEN 325 MG/1
650 TABLET ORAL ONCE
Status: CANCELLED | OUTPATIENT
Start: 2021-09-17

## 2021-09-17 RX ORDER — DIPHENHYDRAMINE HYDROCHLORIDE 50 MG/ML
25 INJECTION INTRAMUSCULAR; INTRAVENOUS PRN
Status: CANCELLED | OUTPATIENT
Start: 2021-09-17

## 2021-09-17 RX ORDER — 0.9 % SODIUM CHLORIDE 0.9 %
VIAL (ML) INJECTION PRN
Status: CANCELLED | OUTPATIENT
Start: 2021-09-17

## 2021-09-17 RX ORDER — SODIUM CHLORIDE 9 MG/ML
INJECTION, SOLUTION INTRAVENOUS CONTINUOUS
Status: CANCELLED | OUTPATIENT
Start: 2021-09-17

## 2021-09-17 RX ORDER — 0.9 % SODIUM CHLORIDE 0.9 %
3 VIAL (ML) INJECTION PRN
Status: CANCELLED | OUTPATIENT
Start: 2021-09-17

## 2021-09-17 NOTE — PROGRESS NOTES
Pt arrived to [facility] for CBC/COD lab draw for possible blood products on 9/19/2021 appt. POC discussed with pt and she agrees with plan. Blood draw competed, results reviewed Hgb 8.3 and platelets 272K today. Pt does NOT meet parameters for blood products. Pt updated on lab results. 9/19/2021 appt cancelled as no blood products needed per parameters.  Pt verbalized understanding. Pt's next appt 10/1/2021 for CBC/COD and 10/3/2021 for blood products if needed. Pt discharged to [facility] care, [facility].

## 2021-09-19 ENCOUNTER — APPOINTMENT (OUTPATIENT)
Dept: ONCOLOGY | Facility: MEDICAL CENTER | Age: 26
End: 2021-09-19
Attending: INTERNAL MEDICINE
Payer: COMMERCIAL

## 2021-09-20 ENCOUNTER — HOSPITAL ENCOUNTER (OUTPATIENT)
Dept: LAB | Facility: MEDICAL CENTER | Age: 26
End: 2021-09-20
Attending: INTERNAL MEDICINE
Payer: COMMERCIAL

## 2021-09-20 LAB
ALBUMIN SERPL BCP-MCNC: 3.9 G/DL (ref 3.2–4.9)
ALP SERPL-CCNC: 103 U/L (ref 30–99)
ALT SERPL-CCNC: 15 U/L (ref 2–50)
AST SERPL-CCNC: 26 U/L (ref 12–45)
BASOPHILS # BLD AUTO: 0.9 % (ref 0–1.8)
BASOPHILS # BLD: 0.04 K/UL (ref 0–0.12)
BILIRUB CONJ SERPL-MCNC: <0.2 MG/DL (ref 0.1–0.5)
BILIRUB INDIRECT SERPL-MCNC: ABNORMAL MG/DL (ref 0–1)
BILIRUB SERPL-MCNC: 0.5 MG/DL (ref 0.1–1.5)
C3 SERPL-MCNC: 66.3 MG/DL (ref 87–200)
C4 SERPL-MCNC: 10.1 MG/DL (ref 19–52)
EOSINOPHIL # BLD AUTO: 0.02 K/UL (ref 0–0.51)
EOSINOPHIL NFR BLD: 0.4 % (ref 0–6.9)
ERYTHROCYTE [DISTWIDTH] IN BLOOD BY AUTOMATED COUNT: 74.5 FL (ref 35.9–50)
HCT VFR BLD AUTO: 29.4 % (ref 37–47)
HGB BLD-MCNC: 8.7 G/DL (ref 12–16)
IMM GRANULOCYTES # BLD AUTO: 0.02 K/UL (ref 0–0.11)
IMM GRANULOCYTES NFR BLD AUTO: 0.4 % (ref 0–0.9)
LYMPHOCYTES # BLD AUTO: 0.34 K/UL (ref 1–4.8)
LYMPHOCYTES NFR BLD: 7.4 % (ref 22–41)
MCH RBC QN AUTO: 26.4 PG (ref 27–33)
MCHC RBC AUTO-ENTMCNC: 29.6 G/DL (ref 33.6–35)
MCV RBC AUTO: 89.4 FL (ref 81.4–97.8)
MONOCYTES # BLD AUTO: 0.23 K/UL (ref 0–0.85)
MONOCYTES NFR BLD AUTO: 5 % (ref 0–13.4)
NEUTROPHILS # BLD AUTO: 3.92 K/UL (ref 2–7.15)
NEUTROPHILS NFR BLD: 85.9 % (ref 44–72)
NRBC # BLD AUTO: 0 K/UL
NRBC BLD-RTO: 0 /100 WBC
PLATELET # BLD AUTO: 240 K/UL (ref 164–446)
PMV BLD AUTO: 10.8 FL (ref 9–12.9)
PROT SERPL-MCNC: 8.9 G/DL (ref 6–8.2)
RBC # BLD AUTO: 3.29 M/UL (ref 4.2–5.4)
WBC # BLD AUTO: 4.6 K/UL (ref 4.8–10.8)

## 2021-09-20 PROCEDURE — 85025 COMPLETE CBC W/AUTO DIFF WBC: CPT

## 2021-09-20 PROCEDURE — 36415 COLL VENOUS BLD VENIPUNCTURE: CPT

## 2021-09-20 PROCEDURE — 80076 HEPATIC FUNCTION PANEL: CPT

## 2021-09-20 PROCEDURE — 86160 COMPLEMENT ANTIGEN: CPT

## 2021-09-27 ENCOUNTER — HOSPITAL ENCOUNTER (EMERGENCY)
Facility: MEDICAL CENTER | Age: 26
End: 2021-09-27
Attending: EMERGENCY MEDICINE
Payer: COMMERCIAL

## 2021-09-27 ENCOUNTER — APPOINTMENT (OUTPATIENT)
Dept: RADIOLOGY | Facility: MEDICAL CENTER | Age: 26
End: 2021-09-27
Attending: EMERGENCY MEDICINE
Payer: COMMERCIAL

## 2021-09-27 VITALS
OXYGEN SATURATION: 100 % | BODY MASS INDEX: 18.34 KG/M2 | RESPIRATION RATE: 16 BRPM | SYSTOLIC BLOOD PRESSURE: 144 MMHG | DIASTOLIC BLOOD PRESSURE: 97 MMHG | HEIGHT: 67 IN | TEMPERATURE: 97.3 F | HEART RATE: 71 BPM | WEIGHT: 116.84 LBS

## 2021-09-27 DIAGNOSIS — M79.672 LEFT FOOT PAIN: ICD-10-CM

## 2021-09-27 DIAGNOSIS — M25.552 LEFT HIP PAIN: ICD-10-CM

## 2021-09-27 PROCEDURE — 96372 THER/PROPH/DIAG INJ SC/IM: CPT

## 2021-09-27 PROCEDURE — 73502 X-RAY EXAM HIP UNI 2-3 VIEWS: CPT | Mod: LT

## 2021-09-27 PROCEDURE — 73630 X-RAY EXAM OF FOOT: CPT | Mod: LT

## 2021-09-27 PROCEDURE — 99284 EMERGENCY DEPT VISIT MOD MDM: CPT

## 2021-09-27 PROCEDURE — 700111 HCHG RX REV CODE 636 W/ 250 OVERRIDE (IP): Performed by: EMERGENCY MEDICINE

## 2021-09-27 RX ORDER — MORPHINE SULFATE 4 MG/ML
4 INJECTION, SOLUTION INTRAMUSCULAR; INTRAVENOUS ONCE
Status: COMPLETED | OUTPATIENT
Start: 2021-09-27 | End: 2021-09-27

## 2021-09-27 RX ADMIN — MORPHINE SULFATE 4 MG: 4 INJECTION INTRAVENOUS at 07:12

## 2021-09-27 ASSESSMENT — FIBROSIS 4 INDEX: FIB4 SCORE: 0.64

## 2021-09-27 NOTE — ED PROVIDER NOTES
"ED Provider Note    CHIEF COMPLAINT   Chief Complaint   Patient presents with   • Foot Pain     left   • Hip Pain     left       HPI   Lily Nicole is a 25 y.o. female who presents with 7 days of left foot pain, stating it was too painful today therefore skipped dialysis and came to the ER.  Second complaint is left hip pain, present for 2 days.  Left hip pain is lateral.  No buttock pain, no posterior leg pain.  She states there is no discomfort to her thigh, knee or calf.  Pain in the left foot is worse with movement or bearing weight.  She denies injury.  During her second conversation with the patient, she now states she is currently getting physical therapy for left hip problems.  She states she is known to [facility] orthopedics.    REVIEW OF SYSTEMS   Musculoskeletal: Left foot pain, left hip pain.  History of left hip problems  Neurologic: No acute numbness  Rheumatologic: Lupus  Skin: No swelling, redness  Constitutional: No fever  Cardiac: No chest pain  Respiratory: No shortness of breath  Nephrology: Dialysis Monday Wednesday Friday      PAST MEDICAL HISTORY   Past Medical History:   Diagnosis Date   • Anemia 01/17/2018   • AVF (arteriovenous fistula) ([facility])     Right Arm   • Chest pain    • Chest tightness    • Daytime sleepiness    • Dialysis patient ([facility])      dialysis, M,W,F Elizabeth/Joni   • Difficulty breathing    • ESRD (end stage renal disease) on dialysis ([facility]) 01/17/2018    Twan Fu   • Gasping for breath    • Heart burn    • Hypertension 01/17/2018    \"Controlled with medication\"   • Indigestion    • Lupus ([facility])    • Migraines 01/17/2018   • Painful breathing    • Palpitations    • Seizure ([facility]) 2013    from high blood pressure, reports 1 time event   • Shortness of breath    • Swelling of lower extremity    • Wheezing        FAMILY HISTORY  Family History   Problem Relation Age of Onset   • Diabetes Paternal Grandmother        SOCIAL HISTORY  Social History     Socioeconomic History   • " Marital status: Single     Spouse name: Not on file   • Number of children: Not on file   • Years of education: Not on file   • Highest education level: Not on file   Occupational History   • Not on file   Tobacco Use   • Smoking status: Never Smoker   • Smokeless tobacco: Never Used   Vaping Use   • Vaping Use: Never used   Substance and Sexual Activity   • Alcohol use: No   • Drug use: No   • Sexual activity: Not on file   Other Topics Concern   • Not on file   Social History Narrative   • Not on file     Social Determinants of Health     Financial Resource Strain: Low Risk    • Difficulty of Paying Living Expenses: Not hard at all   Food Insecurity: No Food Insecurity   • Worried About Running Out of Food in the Last Year: Never true   • Ran Out of Food in the Last Year: Never true   Transportation Needs: No Transportation Needs   • Lack of Transportation (Medical): No   • Lack of Transportation (Non-Medical): No   Physical Activity:    • Days of Exercise per Week:    • Minutes of Exercise per Session:    Stress:    • Feeling of Stress :    Social Connections:    • Frequency of Communication with Friends and Family:    • Frequency of Social Gatherings with Friends and Family:    • Attends Buddhist Services:    • Active Member of Clubs or Organizations:    • Attends Club or Organization Meetings:    • Marital Status:    Intimate Partner Violence:    • Fear of Current or Ex-Partner:    • Emotionally Abused:    • Physically Abused:    • Sexually Abused:        SURGICAL HISTORY  Past Surgical History:   Procedure Laterality Date   • PB BRONCHOSCOPY,DIAGNOSTIC Bilateral 5/13/2021    Procedure: BRONCHOSCOPY, BRONCHOALVEOLAR LAVAGE;  Surgeon: William Spangler M.D.;  Location: [facility];  Service: Pulmonary   • PB UPPER GI ENDOSCOPY,DIAGNOSIS N/A 3/19/2021    Procedure: GASTROSCOPY;  Surgeon: Waylon Mcqueen M.D.;  Location: [facility];  Service: Gastroenterology   • PB UPPER GI  "ENDOSCOPY,CTRL BLEED  3/19/2021    Procedure: GASTROSCOPY, WITH ARGON PLASMA COAGULATION;  Surgeon: Waylon Mcqeuen M.D.;  Location: [facility];  Service: Gastroenterology   • PB UPPER GI ENDOSCOPY,DIAGNOSIS  3/5/2021    Procedure: GASTROSCOPY - W/HEMOSTASIS;  Surgeon: Ej Silva M.D.;  Location: [facility];  Service: Gastroenterology   • PB COLONOSCOPY,DIAGNOSTIC  1/8/2021    Procedure: COLONOSCOPY;  Surgeon: Herbert Contreras M.D.;  Location: [facility];  Service: Gastroenterology   • PB UPPER GI ENDOSCOPY,DIAGNOSIS  1/8/2021    Procedure: GASTROSCOPY;  Surgeon: Herbert Contreras M.D.;  Location: [facility];  Service: Gastroenterology   • PB UPPER GI ENDOSCOPY,CTRL BLEED  1/8/2021    Procedure: GASTROSCOPY, WITH ARGON PLASMA COAGULATION;  Surgeon: Herbert Contreras M.D.;  Location: [facility];  Service: Gastroenterology   • PB UPPER GI ENDOSCOPY,CTRL BLEED  11/12/2020    Procedure: GASTROSCOPY, WITH ARGON PLASMA COAGULATION;  Surgeon: Gadiel Whitney M.D.;  Location: [facility];  Service: Gastroenterology   • PB UPPER GI ENDOSCOPY,BIOPSY  11/12/2020    Procedure: GASTROSCOPY, WITH BIOPSY;  Surgeon: Gadiel Whitney M.D.;  Location: [facility];  Service: Gastroenterology   • GASTROSCOPY-ENDO  11/12/2020    Procedure: GASTROSCOPY;  Surgeon: Gadiel Whitney M.D.;  Location: [facility];  Service: Gastroenterology   • GASTROSCOPY-ENDO  9/18/2020    Procedure: GASTROSCOPY;  Surgeon: Gadiel Whitney M.D.;  Location: [facility];  Service: Gastroenterology   • GASTROSCOPY N/A 5/30/2020    Procedure: GASTROSCOPY;  Surgeon: Waylon Mcqueen M.D.;  Location: [facility];  Service: Gastroenterology   • GASTROSCOPY-ENDO  12/9/2019    Procedure: GASTROSCOPY;  Surgeon: Aaron Kam M.D.;  Location: [facility];  Service: Gastroenterology   • ANGIOPLASTY  01/17/2018    \"Right Arm AV-Fistulagram & Angioplastyx3\" " "  • ULI BY LAPAROSCOPY  4/5/2010    Performed by SYED MARTELL at SURGERY [facility] ORS   • AV FISTULA CREATION Right    • OTHER      renal biopsy x 3   • OTHER      bone marrow biopsy       CURRENT MEDICATIONS   Home Medications    **Home medications have not yet been reviewed for this encounter**         ALLERGIES   Allergies   Allergen Reactions   • Cephalexin Rash     .   • Clindamycin Rash     .   • Methylprednisolone Unspecified     Anxious   • Metoprolol Rash     .   • Maxipime [Cefepime] Itching   • Norco [Hydrocodone-Acetaminophen] Rash and Nausea     Generalized rash   • Tape        PHYSICAL EXAM  VITAL SIGNS: /94   Pulse 90   Temp 36.3 °C (97.3 °F) (Temporal)   Resp 16   Ht 1.702 m (5' 7\")   Wt 53 kg (116 lb 13.5 oz)   LMP 09/20/2021   SpO2 99%   BMI 18.30 kg/m²   Skin: No asymmetric edema, no bruising, no sign of trauma, no pallor.   Vascular: Intact distal capillary refill.  Weakly palpable left dorsal pedis pulse  Musculoskeletal: Diffuse tenderness left foot and ankle.  No crepitance or deformity.  Left knee, left calf, left quadricep are nontender.  Left lateral hip is tender.  Range of motion left hip and axial pressure into the joint does not elicit pain.  Lumbar spine nontender  Neurologic: Sensation intact left foot    RADIOLOGY/PROCEDURES  DX-HIP-COMPLETE - UNILATERAL 2+ LEFT   Final Result      1.  Findings again highly suspicious for osteonecrosis of the LEFT femoral head   2.  Osteopenia      DX-FOOT-COMPLETE 3+ LEFT   Final Result      1.  No radiographic evidence of acute traumatic injury.   2.  Osteopenia            COURSE & MEDICAL DECISION MAKING  Pertinent Labs & Imaging studies reviewed. (See chart for details)  Hip finding shows evidence of osteonecrosis left femoral head, this finding was discussed with the patient who states she believes this to be the cause of her left hip problems for which she is under the care of [facility] orthopedics.  I have recommended " she follow-up with them as soon as possible for reevaluation of both her hip and her left foot.  With the palpable pulse, warmth of the skin equal to the right versus left, I do not believe her foot to be ischemic.  I suspect her left hip pain is the result of a chronic condition in conjunction with walking differently secondary to a painful left foot exacerbating her discomfort.  She is placed on crutches for weightbearing as tolerated, and will follow up with orthopedics for reevaluation.    FINAL IMPRESSION     1. Left foot pain     2. Left hip pain               Electronically signed by: Vinod Guzman M.D., 9/27/2021 7:01 AM

## 2021-09-27 NOTE — DISCHARGE INSTRUCTIONS
Follow-up with your orthopedics specialist at [facility] orthopedics.    Use crutches to keep weight off left leg if it is painful to walk on.    Return for any concerns

## 2021-09-30 ENCOUNTER — HOSPITAL ENCOUNTER (EMERGENCY)
Facility: MEDICAL CENTER | Age: 26
End: 2021-10-01
Attending: EMERGENCY MEDICINE | Admitting: EMERGENCY MEDICINE
Payer: COMMERCIAL

## 2021-09-30 DIAGNOSIS — D64.9 ANEMIA, UNSPECIFIED TYPE: ICD-10-CM

## 2021-09-30 LAB
ABO GROUP BLD: NORMAL
ALBUMIN SERPL BCP-MCNC: 3.4 G/DL (ref 3.2–4.9)
ALBUMIN/GLOB SERPL: 0.7 G/DL
ALP SERPL-CCNC: 100 U/L (ref 30–99)
ALT SERPL-CCNC: 16 U/L (ref 2–50)
ANION GAP SERPL CALC-SCNC: 12 MMOL/L (ref 7–16)
AST SERPL-CCNC: 28 U/L (ref 12–45)
BARCODED ABORH UBTYP: 5100
BARCODED PRD CODE UBPRD: NORMAL
BARCODED UNIT NUM UBUNT: NORMAL
BASOPHILS # BLD AUTO: 0.5 % (ref 0–1.8)
BASOPHILS # BLD: 0.03 K/UL (ref 0–0.12)
BILIRUB SERPL-MCNC: 0.4 MG/DL (ref 0.1–1.5)
BLD GP AB SCN SERPL QL: NORMAL
BUN SERPL-MCNC: 41 MG/DL (ref 8–22)
CALCIUM SERPL-MCNC: 9.3 MG/DL (ref 8.4–10.2)
CHLORIDE SERPL-SCNC: 88 MMOL/L (ref 96–112)
CO2 SERPL-SCNC: 30 MMOL/L (ref 20–33)
COMMENT 1642: NORMAL
COMPONENT R 8504R: NORMAL
CREAT SERPL-MCNC: 4.92 MG/DL (ref 0.5–1.4)
EOSINOPHIL # BLD AUTO: 0.04 K/UL (ref 0–0.51)
EOSINOPHIL NFR BLD: 0.7 % (ref 0–6.9)
ERYTHROCYTE [DISTWIDTH] IN BLOOD BY AUTOMATED COUNT: 68.5 FL (ref 35.9–50)
GLOBULIN SER CALC-MCNC: 5 G/DL (ref 1.9–3.5)
GLUCOSE SERPL-MCNC: 95 MG/DL (ref 65–99)
HCT VFR BLD AUTO: 24.3 % (ref 37–47)
HGB BLD-MCNC: 7.2 G/DL (ref 12–16)
HYPOCHROMIA BLD QL SMEAR: ABNORMAL
IMM GRANULOCYTES # BLD AUTO: 0.02 K/UL (ref 0–0.11)
IMM GRANULOCYTES NFR BLD AUTO: 0.3 % (ref 0–0.9)
LG PLATELETS BLD QL SMEAR: NORMAL
LYMPHOCYTES # BLD AUTO: 0.42 K/UL (ref 1–4.8)
LYMPHOCYTES NFR BLD: 7.3 % (ref 22–41)
MCH RBC QN AUTO: 27.4 PG (ref 27–33)
MCHC RBC AUTO-ENTMCNC: 29.6 G/DL (ref 33.6–35)
MCV RBC AUTO: 92.4 FL (ref 81.4–97.8)
MONOCYTES # BLD AUTO: 0.27 K/UL (ref 0–0.85)
MONOCYTES NFR BLD AUTO: 4.7 % (ref 0–13.4)
NEUTROPHILS # BLD AUTO: 5 K/UL (ref 2–7.15)
NEUTROPHILS NFR BLD: 86.5 % (ref 44–72)
NRBC # BLD AUTO: 0 K/UL
NRBC BLD-RTO: 0 /100 WBC
OVALOCYTES BLD QL SMEAR: NORMAL
PLATELET # BLD AUTO: 252 K/UL (ref 164–446)
PLATELET BLD QL SMEAR: NORMAL
PMV BLD AUTO: 9.7 FL (ref 9–12.9)
POIKILOCYTOSIS BLD QL SMEAR: NORMAL
POLYCHROMASIA BLD QL SMEAR: NORMAL
POTASSIUM SERPL-SCNC: 4.9 MMOL/L (ref 3.6–5.5)
PRODUCT TYPE UPROD: NORMAL
PROT SERPL-MCNC: 8.4 G/DL (ref 6–8.2)
RBC # BLD AUTO: 2.63 M/UL (ref 4.2–5.4)
RBC BLD AUTO: PRESENT
RH BLD: NORMAL
SODIUM SERPL-SCNC: 130 MMOL/L (ref 135–145)
UNIT STATUS USTAT: NORMAL
WBC # BLD AUTO: 5.8 K/UL (ref 4.8–10.8)

## 2021-09-30 PROCEDURE — 86922 COMPATIBILITY TEST ANTIGLOB: CPT

## 2021-09-30 PROCEDURE — 96376 TX/PRO/DX INJ SAME DRUG ADON: CPT

## 2021-09-30 PROCEDURE — 86901 BLOOD TYPING SEROLOGIC RH(D): CPT

## 2021-09-30 PROCEDURE — A9270 NON-COVERED ITEM OR SERVICE: HCPCS | Performed by: EMERGENCY MEDICINE

## 2021-09-30 PROCEDURE — 85025 COMPLETE CBC W/AUTO DIFF WBC: CPT

## 2021-09-30 PROCEDURE — 36415 COLL VENOUS BLD VENIPUNCTURE: CPT

## 2021-09-30 PROCEDURE — 700111 HCHG RX REV CODE 636 W/ 250 OVERRIDE (IP): Performed by: EMERGENCY MEDICINE

## 2021-09-30 PROCEDURE — 86900 BLOOD TYPING SEROLOGIC ABO: CPT

## 2021-09-30 PROCEDURE — 86850 RBC ANTIBODY SCREEN: CPT

## 2021-09-30 PROCEDURE — 80053 COMPREHEN METABOLIC PANEL: CPT

## 2021-09-30 PROCEDURE — 96374 THER/PROPH/DIAG INJ IV PUSH: CPT

## 2021-09-30 PROCEDURE — 99284 EMERGENCY DEPT VISIT MOD MDM: CPT

## 2021-09-30 PROCEDURE — 700102 HCHG RX REV CODE 250 W/ 637 OVERRIDE(OP): Performed by: EMERGENCY MEDICINE

## 2021-09-30 RX ORDER — DIPHENHYDRAMINE HYDROCHLORIDE 50 MG/ML
25 INJECTION INTRAMUSCULAR; INTRAVENOUS ONCE
Status: COMPLETED | OUTPATIENT
Start: 2021-09-30 | End: 2021-10-01

## 2021-09-30 RX ORDER — ONDANSETRON 4 MG/1
4 TABLET, ORALLY DISINTEGRATING ORAL ONCE
Status: COMPLETED | OUTPATIENT
Start: 2021-10-01 | End: 2021-09-30

## 2021-09-30 RX ORDER — DIPHENHYDRAMINE HYDROCHLORIDE 50 MG/ML
25 INJECTION INTRAMUSCULAR; INTRAVENOUS ONCE
Status: COMPLETED | OUTPATIENT
Start: 2021-09-30 | End: 2021-09-30

## 2021-09-30 RX ORDER — LORAZEPAM 1 MG/1
1 TABLET ORAL ONCE
Status: COMPLETED | OUTPATIENT
Start: 2021-09-30 | End: 2021-09-30

## 2021-09-30 RX ORDER — ACETAMINOPHEN 500 MG
1000 TABLET ORAL ONCE
Status: COMPLETED | OUTPATIENT
Start: 2021-10-01 | End: 2021-09-30

## 2021-09-30 RX ORDER — ACETAMINOPHEN 325 MG/1
650 TABLET ORAL ONCE
Status: COMPLETED | OUTPATIENT
Start: 2021-09-30 | End: 2021-09-30

## 2021-09-30 RX ADMIN — ONDANSETRON 4 MG: 4 TABLET, ORALLY DISINTEGRATING ORAL at 23:54

## 2021-09-30 RX ADMIN — ACETAMINOPHEN 1000 MG: 500 TABLET, FILM COATED ORAL at 23:54

## 2021-09-30 RX ADMIN — ACETAMINOPHEN 650 MG: 325 TABLET, FILM COATED ORAL at 20:05

## 2021-09-30 RX ADMIN — DIPHENHYDRAMINE HYDROCHLORIDE 25 MG: 50 INJECTION INTRAMUSCULAR; INTRAVENOUS at 20:05

## 2021-09-30 RX ADMIN — LORAZEPAM 1 MG: 1 TABLET ORAL at 20:05

## 2021-09-30 ASSESSMENT — PAIN DESCRIPTION - PAIN TYPE: TYPE: ACUTE PAIN

## 2021-09-30 ASSESSMENT — FIBROSIS 4 INDEX: FIB4 SCORE: 0.64

## 2021-10-01 ENCOUNTER — APPOINTMENT (OUTPATIENT)
Dept: ONCOLOGY | Facility: MEDICAL CENTER | Age: 26
End: 2021-10-01
Attending: INTERNAL MEDICINE
Payer: COMMERCIAL

## 2021-10-01 VITALS
SYSTOLIC BLOOD PRESSURE: 156 MMHG | OXYGEN SATURATION: 95 % | HEIGHT: 67 IN | TEMPERATURE: 98.3 F | DIASTOLIC BLOOD PRESSURE: 85 MMHG | BODY MASS INDEX: 18.72 KG/M2 | RESPIRATION RATE: 18 BRPM | HEART RATE: 72 BPM | WEIGHT: 119.27 LBS

## 2021-10-01 PROCEDURE — 36430 TRANSFUSION BLD/BLD COMPNT: CPT

## 2021-10-01 PROCEDURE — 700111 HCHG RX REV CODE 636 W/ 250 OVERRIDE (IP): Performed by: EMERGENCY MEDICINE

## 2021-10-01 PROCEDURE — P9016 RBC LEUKOCYTES REDUCED: HCPCS

## 2021-10-01 PROCEDURE — 86902 BLOOD TYPE ANTIGEN DONOR EA: CPT

## 2021-10-01 PROCEDURE — 36415 COLL VENOUS BLD VENIPUNCTURE: CPT

## 2021-10-01 RX ADMIN — DIPHENHYDRAMINE HYDROCHLORIDE 25 MG: 50 INJECTION INTRAMUSCULAR; INTRAVENOUS at 04:34

## 2021-10-01 NOTE — ED NOTES
Patient transferred onto a hospital bed for comfort.   Pending blood bank to verify blood and PRBC to be delivered from [facility] Main Lab to [facility]

## 2021-10-01 NOTE — ED NOTES
Call the lab dept about the RBC still waiting for it to arrive from [facility] main lab

## 2021-10-01 NOTE — ED NOTES
Another lavender blood tube needed per blood bank, sent to lab. Then to be transported to [facility] Main Lab dept

## 2021-10-01 NOTE — ED NOTES
Spoke to [facility] blood back, states blood products are on the way, Siva MARTINEZ notified

## 2021-10-01 NOTE — ED NOTES
Spoke to blood bank regarding blood. She has antibodies so blood had to be processed by [facility]. RBC unit will be sent back to [facility] once completed.. It will take a few hours to receive.

## 2021-10-01 NOTE — ED PROVIDER NOTES
ED Provider Note    CHIEF COMPLAINT  Chief Complaint   Patient presents with   • Abnormal Labs     low hgb       HPI  Lily Nicole is a 25 y.o. female who presents for anemia.  Patient has a history of ESRD and prior anemia.  She had basic labs checked as an outpatient and hemoglobin was 6.2.  Patient dialysis days are Monday Wednesday Friday.  Her last dialysis was yesterday.  Patient reports that over the last few days she has felt very weak and dizzy when she stands up.  She reports this is similar to prior episodes of severe anemia.  Patient has had GI bleeds in the past but denies any hematochezia or melena.  She denies any coffee-ground emesis.  Patient denies any bleeding otherwise.  Patient has a history of lupus.  Patient denies any associated nausea or vomiting or shortness of breath.  She reports that she does not feel volume overloaded.    REVIEW OF SYSTEMS  ROS    See HPI for further details. All other systems are negative.     PAST MEDICAL HISTORY   has a past medical history of Anemia (01/17/2018), AVF (arteriovenous fistula) ([facility]), Chest pain, Chest tightness, Daytime sleepiness, Dialysis patient ([facility]), Difficulty breathing, ESRD (end stage renal disease) on dialysis ([facility]) (01/17/2018), Gasping for breath, Heart burn, Hypertension (01/17/2018), Indigestion, Lupus ([facility]), Migraines (01/17/2018), Painful breathing, Palpitations, Seizure ([facility]) (2013), Shortness of breath, Swelling of lower extremity, and Wheezing.    SOCIAL HISTORY  Social History     Tobacco Use   • Smoking status: Never Smoker   • Smokeless tobacco: Never Used   Vaping Use   • Vaping Use: Never used   Substance and Sexual Activity   • Alcohol use: No   • Drug use: No   • Sexual activity: Not on file       SURGICAL HISTORY   has a past surgical history that includes ronak by laparoscopy (4/5/2010); av fistula creation (Right); angioplasty (01/17/2018); other; other; gastroscopy-endo (12/9/2019); gastroscopy (N/A, 5/30/2020);  gastroscopy-endo (9/18/2020); upper gi endoscopy,ctrl bleed (11/12/2020); upper gi endoscopy,biopsy (11/12/2020); gastroscopy-endo (11/12/2020); colonoscopy,diagnostic (1/8/2021); upper gi endoscopy,diagnosis (1/8/2021); upper gi endoscopy,ctrl bleed (1/8/2021); upper gi endoscopy,diagnosis (3/5/2021); upper gi endoscopy,diagnosis (N/A, 3/19/2021); upper gi endoscopy,ctrl bleed (3/19/2021); and bronchoscopy,diagnostic (Bilateral, 5/13/2021).    CURRENT MEDICATIONS  Home Medications    **Home medications have not yet been reviewed for this encounter**         ALLERGIES  Allergies   Allergen Reactions   • Cephalexin Rash     .   • Clindamycin Rash     .   • Methylprednisolone Unspecified     Anxious   • Metoprolol Rash     .   • Maxipime [Cefepime] Itching   • Norco [Hydrocodone-Acetaminophen] Rash and Nausea     Generalized rash   • Tape        PHYSICAL EXAM  Vitals:    09/30/21 1702   BP: 154/102   Pulse: (!) 103   Resp: 18   Temp: 36.5 °C (97.7 °F)   SpO2: 98%       Physical Exam  Constitutional:       Appearance: She is well-developed.   HENT:      Head: Normocephalic and atraumatic.   Eyes:      Conjunctiva/sclera: Conjunctivae normal.   Cardiovascular:      Rate and Rhythm: Normal rate and regular rhythm.   Pulmonary:      Effort: Pulmonary effort is normal.      Breath sounds: Normal breath sounds.   Abdominal:      General: Bowel sounds are normal. There is no distension.      Palpations: Abdomen is soft.      Tenderness: There is no abdominal tenderness. There is no rebound.   Musculoskeletal:      Cervical back: Normal range of motion and neck supple.   Skin:     General: Skin is warm and dry.      Findings: No rash.   Neurological:      Mental Status: She is alert and oriented to person, place, and time.   Psychiatric:         Behavior: Behavior normal.           DIAGNOSTIC STUDIES / PROCEDURES      LABS  Results for orders placed or performed during the hospital encounter of 09/30/21   CBC WITH  DIFFERENTIAL   Result Value Ref Range    WBC 5.8 4.8 - 10.8 K/uL    RBC 2.63 (L) 4.20 - 5.40 M/uL    Hemoglobin 7.2 (L) 12.0 - 16.0 g/dL    Hematocrit 24.3 (L) 37.0 - 47.0 %    MCV 92.4 81.4 - 97.8 fL    MCH 27.4 27.0 - 33.0 pg    MCHC 29.6 (L) 33.6 - 35.0 g/dL    RDW 68.5 (H) 35.9 - 50.0 fL    Platelet Count 252 164 - 446 K/uL    MPV 9.7 9.0 - 12.9 fL    Neutrophils-Polys 86.50 (H) 44.00 - 72.00 %    Lymphocytes 7.30 (L) 22.00 - 41.00 %    Monocytes 4.70 0.00 - 13.40 %    Eosinophils 0.70 0.00 - 6.90 %    Basophils 0.50 0.00 - 1.80 %    Immature Granulocytes 0.30 0.00 - 0.90 %    Nucleated RBC 0.00 /100 WBC    Neutrophils (Absolute) 5.00 2.00 - 7.15 K/uL    Lymphs (Absolute) 0.42 (L) 1.00 - 4.80 K/uL    Monos (Absolute) 0.27 0.00 - 0.85 K/uL    Eos (Absolute) 0.04 0.00 - 0.51 K/uL    Baso (Absolute) 0.03 0.00 - 0.12 K/uL    Immature Granulocytes (abs) 0.02 0.00 - 0.11 K/uL    NRBC (Absolute) 0.00 K/uL    Hypochromia 2+ (A)    CMP   Result Value Ref Range    Sodium 130 (L) 135 - 145 mmol/L    Potassium 4.9 3.6 - 5.5 mmol/L    Chloride 88 (L) 96 - 112 mmol/L    Co2 30 20 - 33 mmol/L    Anion Gap 12.0 7.0 - 16.0    Glucose 95 65 - 99 mg/dL    Bun 41 (H) 8 - 22 mg/dL    Creatinine 4.92 (H) 0.50 - 1.40 mg/dL    Calcium 9.3 8.4 - 10.2 mg/dL    AST(SGOT) 28 12 - 45 U/L    ALT(SGPT) 16 2 - 50 U/L    Alkaline Phosphatase 100 (H) 30 - 99 U/L    Total Bilirubin 0.4 0.1 - 1.5 mg/dL    Albumin 3.4 3.2 - 4.9 g/dL    Total Protein 8.4 (H) 6.0 - 8.2 g/dL    Globulin 5.0 (H) 1.9 - 3.5 g/dL    A-G Ratio 0.7 g/dL   COD (Adult) - Type and Crossmatch only order if transfusing RBC'S   Result Value Ref Range    ABO Grouping Only O     Rh Grouping Only POS     Antibody Screen-Cod NEG    ESTIMATED GFR   Result Value Ref Range    GFR If  13 (A) >60 mL/min/1.73 m 2    GFR If Non African American 11 (A) >60 mL/min/1.73 m 2   PLATELET ESTIMATE   Result Value Ref Range    Plt Estimation Normal    MORPHOLOGY   Result Value Ref  Range    RBC Morphology Present     Large Platelets 1+     Polychromia 1+     Poikilocytosis 1+     Ovalocytes 1+    DIFFERENTIAL COMMENT   Result Value Ref Range    Comments-Diff see below          RADIOLOGY  No orders to display           COURSE & MEDICAL DECISION MAKING  Pertinent Labs & Imaging studies reviewed. (See chart for details)    Patient here with symptoms of anemia likely secondary to anemia of chronic disease and ESRD.  Patient without any complaints of melena or hematochezia.  Patient case will be discussed with Dr. Trent to see if they would like to give blood and dialyze or if they are comfortable with me giving blood without dialysis immediately following.  Patient does not appear volume overloaded.  She is not hypoxic.  She does not any associated crackles.  I discussed the case with nephrology, Dr. Metz.  They would like patient to receive a unit of PRBCs.  Patient labs here have actually returned and she has a hemoglobin above 7, I discussed the case with nephrology again and they still would like me to proceed.  I discussed patient's options she understands she may return home and get dialyzed tomorrow versus getting transfusion today.  Patient would like a transfusion today.  She reports that she has had some very mild reactions in the past and was premedicated with Benadryl and Tylenol.  We will give this to patient.  Patient's blood is delayed this patient has multiple antibodies, patient will remain in the urgency department until the blood has been given.  Patient will be observed.  Patient will be discharged home when blood products have been given as long as she does not have any adverse reaction.      The patient will not drink alcohol nor drive with prescribed medications. The patient will return for worsening symptoms and is stable at the time of discharge. The patient verbalizes understanding and will comply.    FINAL IMPRESSION    1. Anemia, unspecified type                Electronically signed by: Mauro Shah M.D., 9/30/2021 5:47 PM     Tazorac Counseling:  Patient advised that medication is irritating and drying. Patient may need to apply sparingly and wash off after an hour before eventually leaving it on overnight. The patient verbalized understanding of the proper use and possible adverse effects of tazorac. All of the patient's questions and concerns were addressed. Isotretinoin Pregnancy And Lactation Text: This medication is Pregnancy Category X and is considered extremely dangerous during pregnancy. It is unknown if it is excreted in breast milk. Doxycycline Pregnancy And Lactation Text: This medication is Pregnancy Category D and not consider safe during pregnancy. It is also excreted in breast milk but is considered safe for shorter treatment courses. Benzoyl Peroxide Counseling: Patient counseled that medicine may cause skin irritation and bleach clothing. In the event of skin irritation, the patient was advised to reduce the amount of the drug applied or use it less frequently. The patient verbalized understanding of the proper use and possible adverse effects of benzoyl peroxide. All of the patient's questions and concerns were addressed. Birth Control Pills Pregnancy And Lactation Text: This medication should be avoided if pregnant and for the first 30 days post-partum. Spironolactone Counseling: Patient advised regarding risks of diarrhea, abdominal pain, hyperkalemia, birth defects (for female patients), liver toxicity and renal toxicity. The patient may need blood work to monitor liver and kidney function and potassium levels while on therapy. The patient verbalized understanding of the proper use and possible adverse effects of spironolactone. All of the patient's questions and concerns were addressed. Erythromycin Counseling:  I discussed with the patient the risks of erythromycin including but not limited to GI upset, allergic reaction, drug rash, diarrhea, increase in liver enzymes, and yeast infections. Topical Sulfur Applications Pregnancy And Lactation Text: This medication is Pregnancy Category C and has an unknown safety profile during pregnancy. It is unknown if this topical medication is excreted in breast milk. Benzoyl Peroxide Pregnancy And Lactation Text: This medication is Pregnancy Category C. It is unknown if benzoyl peroxide is excreted in breast milk. High Dose Vitamin A Counseling: Side effects reviewed, pt to contact office should one occur. Tazorac Pregnancy And Lactation Text: This medication is not safe during pregnancy. It is unknown if this medication is excreted in breast milk. Dapsone Counseling: I discussed with the patient the risks of dapsone including but not limited to hemolytic anemia, agranulocytosis, rashes, methemoglobinemia, kidney failure, peripheral neuropathy, headaches, GI upset, and liver toxicity. Patients who start dapsone require monitoring including baseline LFTs and weekly CBCs for the first month, then every month thereafter. The patient verbalized understanding of the proper use and possible adverse effects of dapsone. All of the patient's questions and concerns were addressed. Spironolactone Pregnancy And Lactation Text: This medication can cause feminization of the male fetus and should be avoided during pregnancy. The active metabolite is also found in breast milk. Bactrim Counseling:  I discussed with the patient the risks of sulfa antibiotics including but not limited to GI upset, allergic reaction, drug rash, diarrhea, dizziness, photosensitivity, and yeast infections. Rarely, more serious reactions can occur including but not limited to aplastic anemia, agranulocytosis, methemoglobinemia, blood dyscrasias, liver or kidney failure, lung infiltrates or desquamative/blistering drug rashes. Minocycline Pregnancy And Lactation Text: This medication is Pregnancy Category D and not consider safe during pregnancy. It is also excreted in breast milk. Topical Sulfur Applications Counseling: Topical Sulfur Counseling: Patient counseled that this medication may cause skin irritation or allergic reactions. In the event of skin irritation, the patient was advised to reduce the amount of the drug applied or use it less frequently. The patient verbalized understanding of the proper use and possible adverse effects of topical sulfur application. All of the patient's questions and concerns were addressed. Erythromycin Pregnancy And Lactation Text: This medication is Pregnancy Category B and is considered safe during pregnancy. It is also excreted in breast milk. Topical Clindamycin Counseling: Patient counseled that this medication may cause skin irritation or allergic reactions. In the event of skin irritation, the patient was advised to reduce the amount of the drug applied or use it less frequently. The patient verbalized understanding of the proper use and possible adverse effects of clindamycin. All of the patient's questions and concerns were addressed. Topical Retinoid counseling:  Patient advised to apply a pea-sized amount only at bedtime and wait 30 minutes after washing their face before applying. If too drying, patient may add a non-comedogenic moisturizer. The patient verbalized understanding of the proper use and possible adverse effects of retinoids. All of the patient's questions and concerns were addressed. Dapsone Pregnancy And Lactation Text: This medication is Pregnancy Category C and is not considered safe during pregnancy or breast feeding. Tetracycline Counseling: Patient counseled regarding possible photosensitivity and increased risk for sunburn. Patient instructed to avoid sunlight, if possible. When exposed to sunlight, patients should wear protective clothing, sunglasses, and sunscreen. The patient was instructed to call the office immediately if the following severe adverse effects occur:  hearing changes, easy bruising/bleeding, severe headache, or vision changes. The patient verbalized understanding of the proper use and possible adverse effects of tetracycline. All of the patient's questions and concerns were addressed. Patient understands to avoid pregnancy while on therapy due to potential birth defects. Bactrim Pregnancy And Lactation Text: This medication is Pregnancy Category D and is known to cause fetal risk. It is also excreted in breast milk. Sarecycline Counseling: Patient advised regarding possible photosensitivity and discoloration of the teeth, skin, lips, tongue and gums. Patient instructed to avoid sunlight, if possible. When exposed to sunlight, patients should wear protective clothing, sunglasses, and sunscreen. The patient was instructed to call the office immediately if the following severe adverse effects occur:  hearing changes, easy bruising/bleeding, severe headache, or vision changes. The patient verbalized understanding of the proper use and possible adverse effects of sarecycline. All of the patient's questions and concerns were addressed. Topical Clindamycin Pregnancy And Lactation Text: This medication is Pregnancy Category B and is considered safe during pregnancy. It is unknown if it is excreted in breast milk. Topical Retinoid Pregnancy And Lactation Text: This medication is Pregnancy Category C. It is unknown if this medication is excreted in breast milk. Doxycycline Counseling:  Patient counseled regarding possible photosensitivity and increased risk for sunburn. Patient instructed to avoid sunlight, if possible. When exposed to sunlight, patients should wear protective clothing, sunglasses, and sunscreen. The patient was instructed to call the office immediately if the following severe adverse effects occur:  hearing changes, easy bruising/bleeding, severe headache, or vision changes. The patient verbalized understanding of the proper use and possible adverse effects of doxycycline. All of the patient's questions and concerns were addressed. Use Enhanced Medication Counseling?: No Azithromycin Pregnancy And Lactation Text: This medication is considered safe during pregnancy and is also secreted in breast milk. Detail Level: Detailed Minocycline Counseling: Patient advised regarding possible photosensitivity and discoloration of the teeth, skin, lips, tongue and gums. Patient instructed to avoid sunlight, if possible. When exposed to sunlight, patients should wear protective clothing, sunglasses, and sunscreen. The patient was instructed to call the office immediately if the following severe adverse effects occur:  hearing changes, easy bruising/bleeding, severe headache, or vision changes. The patient verbalized understanding of the proper use and possible adverse effects of minocycline. All of the patient's questions and concerns were addressed. Isotretinoin Counseling: Patient should get monthly blood tests, not donate blood, not drive at night if vision affected, not share medication, and not undergo elective surgery for 6 months after tx completed. Side effects reviewed, pt to contact office should one occur. Birth Control Pills Counseling: Birth Control Pill Counseling: I discussed with the patient the potential side effects of OCPs including but not limited to increased risk of stroke, heart attack, thrombophlebitis, deep venous thrombosis, hepatic adenomas, breast changes, GI upset, headaches, and depression. The patient verbalized understanding of the proper use and possible adverse effects of OCPs. All of the patient's questions and concerns were addressed. Azithromycin Counseling:  I discussed with the patient the risks of azithromycin including but not limited to GI upset, allergic reaction, drug rash, diarrhea, and yeast infections. High Dose Vitamin A Pregnancy And Lactation Text: High dose vitamin A therapy is contraindicated during pregnancy and breast feeding.

## 2021-10-01 NOTE — ED NOTES
Spoke with Noreen from the lab dept here at [facility]. PRBC will be transported within the next hour.

## 2021-10-01 NOTE — ED NOTES
Report and transfer fo care from MICHELLE Simon. Per MICHELLE Simon, awaiting on blood to arrive from [facility] pt pt blood transfusion prior to D/C. Pt resting comfortably at this time.

## 2021-10-01 NOTE — ED NOTES
Call the blood bank at [facility] and per telephone conversation with Alix in Blood Bank dept blood specimen just received and will take a couple of more hours before they sent the PRBC

## 2021-10-01 NOTE — ED TRIAGE NOTES
Pt amb to triage w mom; c/o abnormal labs incl low hgb (6.2 hgb, per pt). Pt had her blood levels drawn at dialysis yesterday and instr to go to ER for prbc transfusion r/t anemia, per pt

## 2021-10-03 ENCOUNTER — APPOINTMENT (OUTPATIENT)
Dept: ONCOLOGY | Facility: MEDICAL CENTER | Age: 26
End: 2021-10-03
Attending: INTERNAL MEDICINE
Payer: COMMERCIAL

## 2021-10-13 ENCOUNTER — HOSPITAL ENCOUNTER (EMERGENCY)
Facility: MEDICAL CENTER | Age: 26
End: 2021-10-13
Attending: EMERGENCY MEDICINE
Payer: COMMERCIAL

## 2021-10-13 VITALS
RESPIRATION RATE: 17 BRPM | WEIGHT: 119.05 LBS | OXYGEN SATURATION: 99 % | HEIGHT: 67 IN | BODY MASS INDEX: 18.69 KG/M2 | SYSTOLIC BLOOD PRESSURE: 116 MMHG | TEMPERATURE: 97.2 F | DIASTOLIC BLOOD PRESSURE: 68 MMHG | HEART RATE: 61 BPM

## 2021-10-13 DIAGNOSIS — M25.50 ARTHRALGIA, UNSPECIFIED JOINT: ICD-10-CM

## 2021-10-13 PROCEDURE — 700111 HCHG RX REV CODE 636 W/ 250 OVERRIDE (IP): Performed by: EMERGENCY MEDICINE

## 2021-10-13 PROCEDURE — 96372 THER/PROPH/DIAG INJ SC/IM: CPT

## 2021-10-13 PROCEDURE — 99284 EMERGENCY DEPT VISIT MOD MDM: CPT

## 2021-10-13 RX ORDER — PREDNISONE 10 MG/1
20 TABLET ORAL ONCE
Status: COMPLETED | OUTPATIENT
Start: 2021-10-13 | End: 2021-10-13

## 2021-10-13 RX ADMIN — FENTANYL CITRATE 50 MCG: 50 INJECTION, SOLUTION INTRAMUSCULAR; INTRAVENOUS at 09:19

## 2021-10-13 RX ADMIN — PREDNISONE 20 MG: 10 TABLET ORAL at 09:18

## 2021-10-13 ASSESSMENT — LIFESTYLE VARIABLES: DO YOU DRINK ALCOHOL: NO

## 2021-10-13 ASSESSMENT — FIBROSIS 4 INDEX: FIB4 SCORE: 0.64

## 2021-10-13 ASSESSMENT — PAIN SCALES - WONG BAKER: WONGBAKER_NUMERICALRESPONSE: HURTS A WHOLE LOT

## 2021-10-13 NOTE — ED TRIAGE NOTES
"Chief Complaint   Patient presents with   • Joint Pain   \"I think I am having a flare up of my Lupus.  This pt reports that we always have a hard time obtaining a Sat because her fingers are always cold. She is in no respiratory distress, uses O2 at 3L.  "

## 2021-10-13 NOTE — DISCHARGE PLANNING
Outpatient Dialysis Note    Confirmed patient is active at:    [address] [address]    Schedule: Monday, Wednesday, Friday   Time: 06:00am    Patient is seen by Dr. Trent in HD clinic.    Spoke with Viki at facility who confirmed.    Forwarded records for review.    Mary Han- Dialysis Coordinator # [phone]  [facility]

## 2021-10-13 NOTE — DISCHARGE PLANNING
Outpatient Dialysis Note    I called and spoke with patient, I got a schedule for HD at [facility] today at 1:30pm for tx patient agreed, Dr. Najjar notified.    Mary Han- Dialysis Coordinator Ph# [phone]  [facility]

## 2021-10-17 ENCOUNTER — APPOINTMENT (OUTPATIENT)
Dept: ONCOLOGY | Facility: MEDICAL CENTER | Age: 26
End: 2021-10-17
Attending: INTERNAL MEDICINE
Payer: COMMERCIAL

## 2021-10-22 ENCOUNTER — HOSPITAL ENCOUNTER (OUTPATIENT)
Dept: RADIOLOGY | Facility: MEDICAL CENTER | Age: 26
End: 2021-10-22
Attending: INTERNAL MEDICINE
Payer: COMMERCIAL

## 2021-10-22 DIAGNOSIS — M81.0 OSTEOPOROSIS, UNSPECIFIED OSTEOPOROSIS TYPE, UNSPECIFIED PATHOLOGICAL FRACTURE PRESENCE: ICD-10-CM

## 2021-10-22 PROCEDURE — 77080 DXA BONE DENSITY AXIAL: CPT

## 2021-10-25 ENCOUNTER — HOSPITAL ENCOUNTER (OUTPATIENT)
Dept: LAB | Facility: MEDICAL CENTER | Age: 26
End: 2021-10-25
Attending: INTERNAL MEDICINE
Payer: COMMERCIAL

## 2021-10-25 LAB
ALBUMIN SERPL BCP-MCNC: 3.8 G/DL (ref 3.2–4.9)
ALP SERPL-CCNC: 92 U/L (ref 30–99)
ALT SERPL-CCNC: 20 U/L (ref 2–50)
ANISOCYTOSIS BLD QL SMEAR: ABNORMAL
AST SERPL-CCNC: 30 U/L (ref 12–45)
BASOPHILS # BLD AUTO: 0.9 % (ref 0–1.8)
BASOPHILS # BLD: 0.03 K/UL (ref 0–0.12)
BILIRUB CONJ SERPL-MCNC: <0.2 MG/DL (ref 0.1–0.5)
BILIRUB INDIRECT SERPL-MCNC: ABNORMAL MG/DL (ref 0–1)
BILIRUB SERPL-MCNC: 0.3 MG/DL (ref 0.1–1.5)
C3 SERPL-MCNC: 59.7 MG/DL (ref 87–200)
C4 SERPL-MCNC: 8.4 MG/DL (ref 19–52)
COMMENT 1642: NORMAL
CRP SERPL HS-MCNC: 1.33 MG/DL (ref 0–0.75)
EOSINOPHIL # BLD AUTO: 0.06 K/UL (ref 0–0.51)
EOSINOPHIL NFR BLD: 1.9 % (ref 0–6.9)
ERYTHROCYTE [DISTWIDTH] IN BLOOD BY AUTOMATED COUNT: 63.4 FL (ref 35.9–50)
ERYTHROCYTE [SEDIMENTATION RATE] IN BLOOD BY WESTERGREN METHOD: >140 MM/HOUR (ref 0–25)
HCT VFR BLD AUTO: 28 % (ref 37–47)
HGB BLD-MCNC: 8.3 G/DL (ref 12–16)
IMM GRANULOCYTES # BLD AUTO: 0.02 K/UL (ref 0–0.11)
IMM GRANULOCYTES NFR BLD AUTO: 0.6 % (ref 0–0.9)
LYMPHOCYTES # BLD AUTO: 0.31 K/UL (ref 1–4.8)
LYMPHOCYTES NFR BLD: 9.7 % (ref 22–41)
MACROCYTES BLD QL SMEAR: ABNORMAL
MCH RBC QN AUTO: 26.9 PG (ref 27–33)
MCHC RBC AUTO-ENTMCNC: 29.6 G/DL (ref 33.6–35)
MCV RBC AUTO: 90.6 FL (ref 81.4–97.8)
MICROCYTES BLD QL SMEAR: ABNORMAL
MONOCYTES # BLD AUTO: 0.19 K/UL (ref 0–0.85)
MONOCYTES NFR BLD AUTO: 6 % (ref 0–13.4)
MORPHOLOGY BLD-IMP: NORMAL
NEUTROPHILS # BLD AUTO: 2.58 K/UL (ref 2–7.15)
NEUTROPHILS NFR BLD: 80.9 % (ref 44–72)
NRBC # BLD AUTO: 0 K/UL
NRBC BLD-RTO: 0 /100 WBC
OVALOCYTES BLD QL SMEAR: NORMAL
PLATELET # BLD AUTO: 239 K/UL (ref 164–446)
PLATELET BLD QL SMEAR: NORMAL
PMV BLD AUTO: 10.4 FL (ref 9–12.9)
POIKILOCYTOSIS BLD QL SMEAR: NORMAL
POLYCHROMASIA BLD QL SMEAR: NORMAL
PROT SERPL-MCNC: 9.2 G/DL (ref 6–8.2)
RBC # BLD AUTO: 3.09 M/UL (ref 4.2–5.4)
RBC BLD AUTO: PRESENT
WBC # BLD AUTO: 3.2 K/UL (ref 4.8–10.8)

## 2021-10-25 PROCEDURE — 85025 COMPLETE CBC W/AUTO DIFF WBC: CPT

## 2021-10-25 PROCEDURE — 80076 HEPATIC FUNCTION PANEL: CPT

## 2021-10-25 PROCEDURE — 86235 NUCLEAR ANTIGEN ANTIBODY: CPT

## 2021-10-25 PROCEDURE — 83516 IMMUNOASSAY NONANTIBODY: CPT

## 2021-10-25 PROCEDURE — 86140 C-REACTIVE PROTEIN: CPT

## 2021-10-25 PROCEDURE — 36415 COLL VENOUS BLD VENIPUNCTURE: CPT

## 2021-10-25 PROCEDURE — 85652 RBC SED RATE AUTOMATED: CPT

## 2021-10-25 PROCEDURE — 86160 COMPLEMENT ANTIGEN: CPT

## 2021-10-28 LAB
ENA SM IGG SER-ACNC: 387 AU/ML (ref 0–40)
HISTONE IGG SER IA-ACNC: 0.8 UNITS (ref 0–0.9)

## 2021-10-29 ENCOUNTER — OUTPATIENT INFUSION SERVICES (OUTPATIENT)
Dept: ONCOLOGY | Facility: MEDICAL CENTER | Age: 26
End: 2021-10-29
Attending: INTERNAL MEDICINE
Payer: COMMERCIAL

## 2021-10-29 VITALS
HEART RATE: 104 BPM | SYSTOLIC BLOOD PRESSURE: 116 MMHG | OXYGEN SATURATION: 99 % | TEMPERATURE: 98.7 F | DIASTOLIC BLOOD PRESSURE: 73 MMHG | RESPIRATION RATE: 18 BRPM

## 2021-10-29 DIAGNOSIS — D64.9 SYMPTOMATIC ANEMIA: ICD-10-CM

## 2021-10-29 LAB
ABO GROUP BLD: NORMAL
BASOPHILS # BLD AUTO: 0.7 % (ref 0–1.8)
BASOPHILS # BLD: 0.03 K/UL (ref 0–0.12)
BLD GP AB SCN SERPL QL: NORMAL
CHROMATIN IGG SERPL-ACNC: 41 UNITS (ref 0–19)
EOSINOPHIL # BLD AUTO: 0.03 K/UL (ref 0–0.51)
EOSINOPHIL NFR BLD: 0.7 % (ref 0–6.9)
ERYTHROCYTE [DISTWIDTH] IN BLOOD BY AUTOMATED COUNT: 67.4 FL (ref 35.9–50)
HCT VFR BLD AUTO: 25.5 % (ref 37–47)
HGB BLD-MCNC: 7.5 G/DL (ref 12–16)
IMM GRANULOCYTES # BLD AUTO: 0.03 K/UL (ref 0–0.11)
IMM GRANULOCYTES NFR BLD AUTO: 0.7 % (ref 0–0.9)
LYMPHOCYTES # BLD AUTO: 0.28 K/UL (ref 1–4.8)
LYMPHOCYTES NFR BLD: 6.3 % (ref 22–41)
MCH RBC QN AUTO: 27.2 PG (ref 27–33)
MCHC RBC AUTO-ENTMCNC: 29.4 G/DL (ref 33.6–35)
MCV RBC AUTO: 92.4 FL (ref 81.4–97.8)
MONOCYTES # BLD AUTO: 0.2 K/UL (ref 0–0.85)
MONOCYTES NFR BLD AUTO: 4.5 % (ref 0–13.4)
NEUTROPHILS # BLD AUTO: 3.9 K/UL (ref 2–7.15)
NEUTROPHILS NFR BLD: 87.1 % (ref 44–72)
NRBC # BLD AUTO: 0 K/UL
NRBC BLD-RTO: 0 /100 WBC
PLATELET # BLD AUTO: 171 K/UL (ref 164–446)
PMV BLD AUTO: 10.3 FL (ref 9–12.9)
RBC # BLD AUTO: 2.76 M/UL (ref 4.2–5.4)
RH BLD: NORMAL
WBC # BLD AUTO: 4.5 K/UL (ref 4.8–10.8)

## 2021-10-29 PROCEDURE — 86850 RBC ANTIBODY SCREEN: CPT

## 2021-10-29 PROCEDURE — 36415 COLL VENOUS BLD VENIPUNCTURE: CPT

## 2021-10-29 PROCEDURE — 85025 COMPLETE CBC W/AUTO DIFF WBC: CPT

## 2021-10-29 PROCEDURE — 86900 BLOOD TYPING SEROLOGIC ABO: CPT

## 2021-10-29 PROCEDURE — 86901 BLOOD TYPING SEROLOGIC RH(D): CPT

## 2021-10-29 PROCEDURE — 86922 COMPATIBILITY TEST ANTIGLOB: CPT

## 2021-10-29 RX ORDER — 0.9 % SODIUM CHLORIDE 0.9 %
3 VIAL (ML) INJECTION PRN
Status: CANCELLED | OUTPATIENT
Start: 2021-10-29

## 2021-10-29 RX ORDER — SODIUM CHLORIDE 9 MG/ML
INJECTION, SOLUTION INTRAVENOUS CONTINUOUS
Status: CANCELLED | OUTPATIENT
Start: 2021-10-29

## 2021-10-29 RX ORDER — 0.9 % SODIUM CHLORIDE 0.9 %
10 VIAL (ML) INJECTION PRN
Status: CANCELLED | OUTPATIENT
Start: 2021-10-29

## 2021-10-29 RX ORDER — DIPHENHYDRAMINE HCL 25 MG
25 TABLET ORAL ONCE
Status: CANCELLED | OUTPATIENT
Start: 2021-10-29 | End: 2021-10-29

## 2021-10-29 RX ORDER — ACETAMINOPHEN 325 MG/1
650 TABLET ORAL ONCE
Status: CANCELLED | OUTPATIENT
Start: 2021-10-29

## 2021-10-29 RX ORDER — DIPHENHYDRAMINE HYDROCHLORIDE 50 MG/ML
25 INJECTION INTRAMUSCULAR; INTRAVENOUS PRN
Status: CANCELLED | OUTPATIENT
Start: 2021-10-29

## 2021-10-29 RX ORDER — 0.9 % SODIUM CHLORIDE 0.9 %
VIAL (ML) INJECTION PRN
Status: CANCELLED | OUTPATIENT
Start: 2021-10-29

## 2021-10-29 RX ORDER — ACETAMINOPHEN 325 MG/1
650 TABLET ORAL PRN
Status: CANCELLED | OUTPATIENT
Start: 2021-10-29

## 2021-10-29 RX ORDER — HEPARIN SODIUM (PORCINE) LOCK FLUSH IV SOLN 100 UNIT/ML 100 UNIT/ML
500 SOLUTION INTRAVENOUS PRN
Status: CANCELLED | OUTPATIENT
Start: 2021-10-29

## 2021-10-30 NOTE — PROGRESS NOTES
Lily is here for CBC/COD lab draw for possible blood products on 10/31/21. Labs drawn as ordered. Discharged to self care; no apparent distress noted.  Labs reviewed; Hgb = 7.5. Lily meets parameters for 1 unit of PRBCs. COD sent to blood bank. BB Communication sent for 1 unit for PRBCs on 10/31/21. Updated patient via phone regarding lab results and confirmed appointment for 10/31/21.

## 2021-10-31 ENCOUNTER — OUTPATIENT INFUSION SERVICES (OUTPATIENT)
Dept: ONCOLOGY | Facility: MEDICAL CENTER | Age: 26
End: 2021-10-31
Attending: INTERNAL MEDICINE
Payer: COMMERCIAL

## 2021-10-31 VITALS
BODY MASS INDEX: 18.92 KG/M2 | HEART RATE: 66 BPM | RESPIRATION RATE: 18 BRPM | WEIGHT: 117.73 LBS | DIASTOLIC BLOOD PRESSURE: 100 MMHG | SYSTOLIC BLOOD PRESSURE: 155 MMHG | TEMPERATURE: 97.5 F | HEIGHT: 66 IN | OXYGEN SATURATION: 98 %

## 2021-10-31 DIAGNOSIS — D64.9 SYMPTOMATIC ANEMIA: ICD-10-CM

## 2021-10-31 PROCEDURE — 96374 THER/PROPH/DIAG INJ IV PUSH: CPT

## 2021-10-31 PROCEDURE — 700102 HCHG RX REV CODE 250 W/ 637 OVERRIDE(OP): Performed by: INTERNAL MEDICINE

## 2021-10-31 PROCEDURE — P9016 RBC LEUKOCYTES REDUCED: HCPCS

## 2021-10-31 PROCEDURE — 306780 HCHG STAT FOR TRANSFUSION PER CASE

## 2021-10-31 PROCEDURE — 700111 HCHG RX REV CODE 636 W/ 250 OVERRIDE (IP): Performed by: INTERNAL MEDICINE

## 2021-10-31 PROCEDURE — 86945 BLOOD PRODUCT/IRRADIATION: CPT

## 2021-10-31 PROCEDURE — A9270 NON-COVERED ITEM OR SERVICE: HCPCS | Performed by: INTERNAL MEDICINE

## 2021-10-31 PROCEDURE — 36430 TRANSFUSION BLD/BLD COMPNT: CPT

## 2021-10-31 RX ORDER — AMLODIPINE BESYLATE 5 MG/1
5 TABLET ORAL
COMMUNITY
Start: 2021-10-17 | End: 2023-01-01

## 2021-10-31 RX ORDER — SODIUM CHLORIDE 9 MG/ML
INJECTION, SOLUTION INTRAVENOUS CONTINUOUS
Status: CANCELLED | OUTPATIENT
Start: 2021-10-31

## 2021-10-31 RX ORDER — ACETAMINOPHEN 325 MG/1
650 TABLET ORAL PRN
Status: CANCELLED | OUTPATIENT
Start: 2021-10-31

## 2021-10-31 RX ORDER — ACETAMINOPHEN 325 MG/1
650 TABLET ORAL ONCE
Status: COMPLETED | OUTPATIENT
Start: 2021-10-31 | End: 2021-10-31

## 2021-10-31 RX ORDER — 0.9 % SODIUM CHLORIDE 0.9 %
10 VIAL (ML) INJECTION PRN
Status: CANCELLED | OUTPATIENT
Start: 2021-10-31

## 2021-10-31 RX ORDER — ACETAMINOPHEN 325 MG/1
650 TABLET ORAL ONCE
Status: CANCELLED | OUTPATIENT
Start: 2021-10-31

## 2021-10-31 RX ORDER — 0.9 % SODIUM CHLORIDE 0.9 %
3 VIAL (ML) INJECTION PRN
Status: CANCELLED | OUTPATIENT
Start: 2021-10-31

## 2021-10-31 RX ORDER — HEPARIN SODIUM (PORCINE) LOCK FLUSH IV SOLN 100 UNIT/ML 100 UNIT/ML
500 SOLUTION INTRAVENOUS PRN
Status: CANCELLED | OUTPATIENT
Start: 2021-10-31

## 2021-10-31 RX ORDER — 0.9 % SODIUM CHLORIDE 0.9 %
VIAL (ML) INJECTION PRN
Status: CANCELLED | OUTPATIENT
Start: 2021-10-31

## 2021-10-31 RX ORDER — DIPHENHYDRAMINE HYDROCHLORIDE 50 MG/ML
25 INJECTION INTRAMUSCULAR; INTRAVENOUS PRN
Status: CANCELLED | OUTPATIENT
Start: 2021-10-31

## 2021-10-31 RX ORDER — MYCOPHENOLIC ACID 360 MG/1
360 TABLET, DELAYED RELEASE ORAL EVERY MORNING
Status: ON HOLD | COMMUNITY
Start: 2021-08-20 | End: 2023-01-01

## 2021-10-31 RX ORDER — DIPHENHYDRAMINE HCL 25 MG
25 TABLET ORAL ONCE
Status: CANCELLED | OUTPATIENT
Start: 2021-10-31 | End: 2021-10-31

## 2021-10-31 RX ADMIN — DIPHENHYDRAMINE HYDROCHLORIDE 25 MG: 50 INJECTION INTRAMUSCULAR; INTRAVENOUS at 10:37

## 2021-10-31 RX ADMIN — ACETAMINOPHEN 650 MG: 325 TABLET ORAL at 10:38

## 2021-10-31 ASSESSMENT — FIBROSIS 4 INDEX: FIB4 SCORE: 0.9

## 2021-10-31 NOTE — PROGRESS NOTES
Lily arrives to [facility] for a 1 unit PRBC transfusion. Labs from 10/29 show a Hgb of 7.5. She IS within parameters to receive blood. Consents signed today 10/31/21. 24g PIV placed to LFA x1 attempt, which flushes easily and has brisk blood return. Premedicated with PO tylenol and IV benadryl. Blood transfused per protocol without s/s blood transfusion reaction. Emailed scheduling to add more appointments q 2 weeks. Discharged home to self care in no apparent distress.

## 2021-11-01 ENCOUNTER — HOSPITAL ENCOUNTER (EMERGENCY)
Facility: MEDICAL CENTER | Age: 26
End: 2021-11-01
Attending: EMERGENCY MEDICINE
Payer: COMMERCIAL

## 2021-11-01 VITALS
DIASTOLIC BLOOD PRESSURE: 118 MMHG | SYSTOLIC BLOOD PRESSURE: 180 MMHG | OXYGEN SATURATION: 96 % | HEIGHT: 67 IN | WEIGHT: 121.03 LBS | TEMPERATURE: 97.8 F | BODY MASS INDEX: 19 KG/M2 | RESPIRATION RATE: 18 BRPM | HEART RATE: 84 BPM

## 2021-11-01 DIAGNOSIS — R51.9 ACUTE NONINTRACTABLE HEADACHE, UNSPECIFIED HEADACHE TYPE: ICD-10-CM

## 2021-11-01 DIAGNOSIS — Z86.69 HISTORY OF MIGRAINE: ICD-10-CM

## 2021-11-01 DIAGNOSIS — N18.6 ESRD (END STAGE RENAL DISEASE) (HCC): ICD-10-CM

## 2021-11-01 DIAGNOSIS — Z87.39 HISTORY OF LUPUS NEPHRITIS: ICD-10-CM

## 2021-11-01 DIAGNOSIS — D63.8 ANEMIA, CHRONIC DISEASE: ICD-10-CM

## 2021-11-01 LAB
ALBUMIN SERPL BCP-MCNC: 3.1 G/DL (ref 3.2–4.9)
ALBUMIN/GLOB SERPL: 0.6 G/DL
ALP SERPL-CCNC: 89 U/L (ref 30–99)
ALT SERPL-CCNC: 12 U/L (ref 2–50)
ANION GAP SERPL CALC-SCNC: 18 MMOL/L (ref 7–16)
AST SERPL-CCNC: 25 U/L (ref 12–45)
BASOPHILS # BLD AUTO: 0.9 % (ref 0–1.8)
BASOPHILS # BLD: 0.04 K/UL (ref 0–0.12)
BILIRUB SERPL-MCNC: 0.6 MG/DL (ref 0.1–1.5)
BUN SERPL-MCNC: 69 MG/DL (ref 8–22)
CALCIUM SERPL-MCNC: 9.1 MG/DL (ref 8.4–10.2)
CHLORIDE SERPL-SCNC: 90 MMOL/L (ref 96–112)
CO2 SERPL-SCNC: 27 MMOL/L (ref 20–33)
CREAT SERPL-MCNC: 8.05 MG/DL (ref 0.5–1.4)
EOSINOPHIL # BLD AUTO: 0.08 K/UL (ref 0–0.51)
EOSINOPHIL NFR BLD: 1.8 % (ref 0–6.9)
ERYTHROCYTE [DISTWIDTH] IN BLOOD BY AUTOMATED COUNT: 61.1 FL (ref 35.9–50)
GLOBULIN SER CALC-MCNC: 5 G/DL (ref 1.9–3.5)
GLUCOSE SERPL-MCNC: 89 MG/DL (ref 65–99)
HCG SERPL QL: NEGATIVE
HCT VFR BLD AUTO: 28.3 % (ref 37–47)
HGB BLD-MCNC: 8.4 G/DL (ref 12–16)
IMM GRANULOCYTES # BLD AUTO: 0.01 K/UL (ref 0–0.11)
IMM GRANULOCYTES NFR BLD AUTO: 0.2 % (ref 0–0.9)
LYMPHOCYTES # BLD AUTO: 0.47 K/UL (ref 1–4.8)
LYMPHOCYTES NFR BLD: 10.7 % (ref 22–41)
MAGNESIUM SERPL-MCNC: 1.7 MG/DL (ref 1.5–2.5)
MCH RBC QN AUTO: 26.7 PG (ref 27–33)
MCHC RBC AUTO-ENTMCNC: 29.7 G/DL (ref 33.6–35)
MCV RBC AUTO: 89.8 FL (ref 81.4–97.8)
MONOCYTES # BLD AUTO: 0.2 K/UL (ref 0–0.85)
MONOCYTES NFR BLD AUTO: 4.6 % (ref 0–13.4)
NEUTROPHILS # BLD AUTO: 3.59 K/UL (ref 2–7.15)
NEUTROPHILS NFR BLD: 81.8 % (ref 44–72)
NRBC # BLD AUTO: 0 K/UL
NRBC BLD-RTO: 0 /100 WBC
PLATELET # BLD AUTO: 178 K/UL (ref 164–446)
PMV BLD AUTO: 10.1 FL (ref 9–12.9)
POTASSIUM SERPL-SCNC: 4.8 MMOL/L (ref 3.6–5.5)
PROT SERPL-MCNC: 8.1 G/DL (ref 6–8.2)
RBC # BLD AUTO: 3.15 M/UL (ref 4.2–5.4)
SODIUM SERPL-SCNC: 135 MMOL/L (ref 135–145)
WBC # BLD AUTO: 4.4 K/UL (ref 4.8–10.8)

## 2021-11-01 PROCEDURE — 700111 HCHG RX REV CODE 636 W/ 250 OVERRIDE (IP): Performed by: EMERGENCY MEDICINE

## 2021-11-01 PROCEDURE — 96375 TX/PRO/DX INJ NEW DRUG ADDON: CPT

## 2021-11-01 PROCEDURE — 80053 COMPREHEN METABOLIC PANEL: CPT

## 2021-11-01 PROCEDURE — 85025 COMPLETE CBC W/AUTO DIFF WBC: CPT

## 2021-11-01 PROCEDURE — 96374 THER/PROPH/DIAG INJ IV PUSH: CPT

## 2021-11-01 PROCEDURE — 84703 CHORIONIC GONADOTROPIN ASSAY: CPT

## 2021-11-01 PROCEDURE — 83735 ASSAY OF MAGNESIUM: CPT

## 2021-11-01 PROCEDURE — 99284 EMERGENCY DEPT VISIT MOD MDM: CPT

## 2021-11-01 PROCEDURE — 36000 PLACE NEEDLE IN VEIN: CPT

## 2021-11-01 RX ORDER — DIPHENHYDRAMINE HYDROCHLORIDE 50 MG/ML
25 INJECTION INTRAMUSCULAR; INTRAVENOUS ONCE
Status: COMPLETED | OUTPATIENT
Start: 2021-11-01 | End: 2021-11-01

## 2021-11-01 RX ORDER — METOCLOPRAMIDE HYDROCHLORIDE 5 MG/ML
10 INJECTION INTRAMUSCULAR; INTRAVENOUS ONCE
Status: COMPLETED | OUTPATIENT
Start: 2021-11-01 | End: 2021-11-01

## 2021-11-01 RX ADMIN — DIPHENHYDRAMINE HYDROCHLORIDE 25 MG: 50 INJECTION, SOLUTION INTRAMUSCULAR; INTRAVENOUS at 04:15

## 2021-11-01 RX ADMIN — METOCLOPRAMIDE 10 MG: 5 INJECTION, SOLUTION INTRAMUSCULAR; INTRAVENOUS at 04:15

## 2021-11-01 ASSESSMENT — FIBROSIS 4 INDEX: FIB4 SCORE: 0.9

## 2021-11-01 ASSESSMENT — PAIN DESCRIPTION - PAIN TYPE: TYPE: ACUTE PAIN

## 2021-11-01 NOTE — ED TRIAGE NOTES
"Chief Complaint   Patient presents with   • Migraine     Patient walk-in with father. Per pt, she had a blood transfusion yesterday, after which 1-2 days later she normally gets a migraine. Approx 3 hours ago she was awoken by migraine. Pt took 500 mg Tylenol & ice pack to head which did not relieve pain.       BP (!) 174/125   Pulse 88   Temp 36.7 °C (98.1 °F) (Temporal)   Resp 20   Ht 1.702 m (5' 7\")   Wt 54.9 kg (121 lb 0.5 oz)   SpO2 99%  3 LPM O2 baseline    Has this patient been vaccinated for COVID:  YES  "

## 2021-11-01 NOTE — ED NOTES
Discharge instructions reviewed with patient. AVS signed by patient. PIV removed. Patient understands need for follow-up appointment with healthcare team and to return to ED for worsening symptoms. All questions answered at this time. Patient ambulated to exit with belongings. Patient in stable condition with no signs of distress. Patient agreeable to discharge instructions.

## 2021-11-01 NOTE — DISCHARGE INSTRUCTIONS
You were seen and evaluated in the Emergency Department at [facility] for:     Headache.    You had the following tests and studies:    Thankfully bloodwork is stable.     You received the following medications:    Reglan and benadryl.    You received the following prescriptions:    Continue home meds.  ----------------------------    Please make sure to follow up with:    Primary care provider in dialysis later today, return to the ER immediately for any new or worsening symptoms.  ----------------------------    We always encourage patients to return IMMEDIATELY if they have:  Increased or changing pain, passing out, fevers over 100.4 (taken in your mouth or rectally) for more than 2 days, redness or swelling of skin or tissues, feeling like your heart is beating fast, chest pain that is new or worsening, trouble breathing, feeling like your throat is closing up and can not breath, inability to walk, weakness of any part of your body, new dizziness, severe bleeding that won't stop from any part of your body, if you can't eat or drink, or if you have any other concerns.   If you feel worse, please know that you can always return with any questions, concerns, worse symptoms, or you are feeling unsafe. We certainly cannot say for sure that we have ruled out every illness or dangerous disease, but we feel that at this specific time, your exam, tests, and vital signs like heart rate and blood pressure are safe for discharge.

## 2021-11-01 NOTE — ED PROVIDER NOTES
ED Provider Note    CHIEF COMPLAINT  Chief Complaint   Patient presents with   • Migraine     Patient walk-in with father. Per pt, she had a blood transfusion yesterday, after which 1-2 days later she normally gets a migraine. Approx 3 hours ago she was awoken by migraine. Pt took 500 mg Tylenol & ice pack to head which did not relieve pain.       HPI    Primary care provider: Ella Matthew M.D.  Means of arrival: POV/walk-in  History obtained from: Patient and father and records review  History limited by: Nothing    Lily Nicole is a 25 y.o. female who presents with headache.  Feels like typical migraines.  Long history of such.  Patient is an unfortunate 25-year-old female with a long history of lupus nephritis for 10 years or more.  She is on dialysis 3 times per week last session was on Friday she is doing 2 hours.  She gets headaches frequently after transfusions, and she had a transfusion yesterday for acute on chronic anemia.  No falls or injuries or trauma.  No double or blurry or loss of vision.  Denies any numbness weakness or tingling to her extremities.  No chest or abdominal pain.  No dyspnea.  No cough or hemoptysis.  She did g take Tylenol prior to arrival without significant relief.  No aggravating factors other than bright light and loud noises.  She says that this feels like many prior migraines which she has had numerous times in the past, no thunderclap/sudden onset symptoms constant and worsening since they began they were moderate in severity bitemporal headache radiating to the back of her head.  Patient has been vaccinated against COVID-19.    REVIEW OF SYSTEMS  Constitutional: Negative for fever or chills.   HENT: Negative for rhinorrhea or sore throat.  Positive for headache.  Eyes: Negative for double or blurry or loss of vision.  Respiratory: Negative for cough or shortness of breath.    Cardiovascular: Negative for chest pain or syncope.   Gastrointestinal: Negative for  nausea, vomiting, or abdominal pain.   Genitourinary: Negative for dysuria or flank pain.   Musculoskeletal: Negative for back pain or joint pain.   Skin: Negative for itching or rash.   Neurological: Negative for sensory or motor changes.   See HPI for further details. All other systems are negative.     PAST MEDICAL HISTORY   has a past medical history of Anemia (01/17/2018), AVF (arteriovenous fistula) ([facility]), Chest pain, Chest tightness, Daytime sleepiness, Dialysis patient ([facility]), Difficulty breathing, ESRD (end stage renal disease) on dialysis ([facility]) (01/17/2018), Gasping for breath, Heart burn, Hypertension (01/17/2018), Indigestion, Lupus ([facility]), Migraines (01/17/2018), Painful breathing, Palpitations, Seizure ([facility]) (2013), Shortness of breath, Swelling of lower extremity, and Wheezing.    PAST FAMILY HISTORY  Family History   Problem Relation Age of Onset   • Diabetes Paternal Grandmother        SOCIAL HISTORY  Social History     Tobacco Use   • Smoking status: Never Smoker   • Smokeless tobacco: Never Used   Vaping Use   • Vaping Use: Never used   Substance and Sexual Activity   • Alcohol use: No   • Drug use: No   • Sexual activity: Not on file       SURGICAL HISTORY   has a past surgical history that includes ronak by laparoscopy (4/5/2010); av fistula creation (Right); angioplasty (01/17/2018); other; other; gastroscopy-endo (12/9/2019); gastroscopy (N/A, 5/30/2020); gastroscopy-endo (9/18/2020); upper gi endoscopy,ctrl bleed (11/12/2020); upper gi endoscopy,biopsy (11/12/2020); gastroscopy-endo (11/12/2020); colonoscopy,diagnostic (1/8/2021); upper gi endoscopy,diagnosis (1/8/2021); upper gi endoscopy,ctrl bleed (1/8/2021); upper gi endoscopy,diagnosis (3/5/2021); upper gi endoscopy,diagnosis (N/A, 3/19/2021); upper gi endoscopy,ctrl bleed (3/19/2021); and bronchoscopy,diagnostic (Bilateral, 5/13/2021).    CURRENT MEDICATIONS  Home Medications    **Home medications have not yet been reviewed for this  "encounter**         ALLERGIES  Allergies   Allergen Reactions   • Cephalexin Rash     .   • Clindamycin Rash     .   • Methylprednisolone Unspecified     Anxious   • Metoprolol Rash     .   • Maxipime [Cefepime] Itching   • Norco [Hydrocodone-Acetaminophen] Rash and Nausea     Generalized rash   • Tape        PHYSICAL EXAM  VITAL SIGNS: BP (!) 180/118   Pulse 84   Temp 36.6 °C (97.8 °F)   Resp 18   Ht 1.702 m (5' 7\")   Wt 54.9 kg (121 lb 0.5 oz)   LMP 10/25/2021 (Within Days)   SpO2 96%   BMI 18.96 kg/m²    Pulse ox interpretation: On room air, I interpret this pulse ox as normal.  Constitutional: Chronically ill appearing for age, sitting up.  HEENT: Normocephalic, atraumatic. Posterior pharynx clear, mucous membranes slightly dry.  Eyes:  EOMI. Normal sclerae.  PERRLA 3-2, visual fields full.  Neck: Supple, nontender.  Chest/Pulmonary: Diminished to ausculation bilaterally, no wheezes or rhonchi.  Cardiovascular: Regular rate and rhythm, 3 out of 6 systolic murmur.  Right upper extremity fistula with good palpable thrill.  Abdomen: Soft, nontender; no rebound, guarding, or masses.  Back: No CVA or midline tenderness.   Musculoskeletal: No deformity or edema.  Neuro: Clear speech, normal coordination, cranial nerves II-XII grossly intact, no focal asymmetry or sensory deficits.   Psych: Normal mood and affect.  Skin: No rashes, warm and dry.      DIAGNOSTIC STUDIES / PROCEDURES    LABS & EKG  Results for orders placed or performed during the hospital encounter of 11/01/21   CBC WITH DIFFERENTIAL   Result Value Ref Range    WBC 4.4 (L) 4.8 - 10.8 K/uL    RBC 3.15 (L) 4.20 - 5.40 M/uL    Hemoglobin 8.4 (L) 12.0 - 16.0 g/dL    Hematocrit 28.3 (L) 37.0 - 47.0 %    MCV 89.8 81.4 - 97.8 fL    MCH 26.7 (L) 27.0 - 33.0 pg    MCHC 29.7 (L) 33.6 - 35.0 g/dL    RDW 61.1 (H) 35.9 - 50.0 fL    Platelet Count 178 164 - 446 K/uL    MPV 10.1 9.0 - 12.9 fL    Neutrophils-Polys 81.80 (H) 44.00 - 72.00 %    Lymphocytes 10.70 " (L) 22.00 - 41.00 %    Monocytes 4.60 0.00 - 13.40 %    Eosinophils 1.80 0.00 - 6.90 %    Basophils 0.90 0.00 - 1.80 %    Immature Granulocytes 0.20 0.00 - 0.90 %    Nucleated RBC 0.00 /100 WBC    Neutrophils (Absolute) 3.59 2.00 - 7.15 K/uL    Lymphs (Absolute) 0.47 (L) 1.00 - 4.80 K/uL    Monos (Absolute) 0.20 0.00 - 0.85 K/uL    Eos (Absolute) 0.08 0.00 - 0.51 K/uL    Baso (Absolute) 0.04 0.00 - 0.12 K/uL    Immature Granulocytes (abs) 0.01 0.00 - 0.11 K/uL    NRBC (Absolute) 0.00 K/uL   COMP METABOLIC PANEL   Result Value Ref Range    Sodium 135 135 - 145 mmol/L    Potassium 4.8 3.6 - 5.5 mmol/L    Chloride 90 (L) 96 - 112 mmol/L    Co2 27 20 - 33 mmol/L    Anion Gap 18.0 (H) 7.0 - 16.0    Glucose 89 65 - 99 mg/dL    Bun 69 (H) 8 - 22 mg/dL    Creatinine 8.05 (HH) 0.50 - 1.40 mg/dL    Calcium 9.1 8.4 - 10.2 mg/dL    AST(SGOT) 25 12 - 45 U/L    ALT(SGPT) 12 2 - 50 U/L    Alkaline Phosphatase 89 30 - 99 U/L    Total Bilirubin 0.6 0.1 - 1.5 mg/dL    Albumin 3.1 (L) 3.2 - 4.9 g/dL    Total Protein 8.1 6.0 - 8.2 g/dL    Globulin 5.0 (H) 1.9 - 3.5 g/dL    A-G Ratio 0.6 g/dL   MAGNESIUM   Result Value Ref Range    Magnesium 1.7 1.5 - 2.5 mg/dL   HCG QUAL SERUM   Result Value Ref Range    Beta-Hcg Qualitative Serum Negative Negative   ESTIMATED GFR   Result Value Ref Range    GFR If  7 (A) >60 mL/min/1.73 m 2    GFR If Non African American 6 (A) >60 mL/min/1.73 m 2         RADIOLOGY  No orders to display       PROCEDURES  Venous Access Procedure Note    Indication: emergent need for intravenous access and MD skill needed    Procedure: The patient was placed in the appropriate position and the skin over the puncture site was prepped with chlorhexidine and draped in a sterile fashion. Intravenous access was obtained in a left forearm vein and the site was secured appropriately.    The patient tolerated the procedure well.    Complications: None      COURSE & MEDICAL DECISION MAKING    This is a 25 y.o.  female who presents with headache, feels like prior migraines.    Differential Diagnosis includes but is not limited to:  Tension, migraine, cluster, intracranial hemorrhage, mass, electrolyte abnormality    ED Course:  25-year-old female with above concerning presentation.  Reassuring neurologic examination, no sudden onset or thunderclap component.  Given history of ESRD plan to check electrolytes and treat with Reglan and Benadryl.  No history of trauma.    Thankfully labs today are stable and reassuring.  No thunderclap onset normal neuro exam patient feeling much better after IV meds.  I think she is safe for discharge return immediately for any new or worsening symptoms.  Go directly to dialysis scheduled in the next hour.    Medications   metoclopramide (REGLAN) injection 10 mg (10 mg Intravenous Given 11/1/21 0415)   diphenhydrAMINE (BENADRYL) injection 25 mg (25 mg Intravenous Given 11/1/21 0415)       FINAL IMPRESSION  1. Acute nonintractable headache, unspecified headache type    2. ESRD (end stage renal disease) (HCC)    3. History of lupus nephritis    4. Anemia, chronic disease    5. History of migraine        PRESCRIPTIONS  Discharge Medication List as of 11/1/2021  5:26 AM          FOLLOW UP  [facility], Emergency Dept  [address]  [facility] [address] [phone]  Today  If you have ANY new or worse symptoms!    Ella Matthew M.D.  [address] [facility] [address] [phone]    Schedule an appointment as soon as possible for a visit in 1 day  for recheck and routine care      -DISCHARGE-       Results, exam findings, clinical impression, presumed diagnosis, treatment options, and strict return precautions were discussed with the patient and family, and they verbalized understanding, agreed with, and appreciated the plan of care.    Portions of this record were made with voice recognition software.  Despite my review, spelling/grammar/context errors may  still remain.  Interpretation of this chart should be taken in this context.    Electronically signed by Chad Mcgraw M.D. on 11/4/2021 at 2:45 PM.

## 2021-11-07 ENCOUNTER — HOSPITAL ENCOUNTER (EMERGENCY)
Facility: MEDICAL CENTER | Age: 26
End: 2021-11-08
Attending: EMERGENCY MEDICINE
Payer: COMMERCIAL

## 2021-11-07 DIAGNOSIS — M79.602 LEFT ARM PAIN: ICD-10-CM

## 2021-11-07 DIAGNOSIS — R60.0 ARM EDEMA: ICD-10-CM

## 2021-11-07 PROCEDURE — 700102 HCHG RX REV CODE 250 W/ 637 OVERRIDE(OP): Performed by: EMERGENCY MEDICINE

## 2021-11-07 PROCEDURE — A9270 NON-COVERED ITEM OR SERVICE: HCPCS | Performed by: EMERGENCY MEDICINE

## 2021-11-07 PROCEDURE — 99284 EMERGENCY DEPT VISIT MOD MDM: CPT

## 2021-11-07 RX ORDER — OXYCODONE HYDROCHLORIDE AND ACETAMINOPHEN 5; 325 MG/1; MG/1
0.5 TABLET ORAL ONCE
Status: COMPLETED | OUTPATIENT
Start: 2021-11-07 | End: 2021-11-07

## 2021-11-07 RX ORDER — DIPHENHYDRAMINE HCL 25 MG
25 TABLET ORAL ONCE
Status: COMPLETED | OUTPATIENT
Start: 2021-11-08 | End: 2021-11-08

## 2021-11-07 RX ORDER — ONDANSETRON 4 MG/1
4 TABLET, ORALLY DISINTEGRATING ORAL ONCE
Status: COMPLETED | OUTPATIENT
Start: 2021-11-08 | End: 2021-11-08

## 2021-11-07 RX ADMIN — OXYCODONE HYDROCHLORIDE AND ACETAMINOPHEN 0.5 TABLET: 5; 325 TABLET ORAL at 23:45

## 2021-11-07 ASSESSMENT — FIBROSIS 4 INDEX: FIB4 SCORE: 0.93

## 2021-11-08 ENCOUNTER — APPOINTMENT (OUTPATIENT)
Dept: RADIOLOGY | Facility: MEDICAL CENTER | Age: 26
End: 2021-11-08
Attending: EMERGENCY MEDICINE
Payer: COMMERCIAL

## 2021-11-08 VITALS
RESPIRATION RATE: 18 BRPM | HEIGHT: 67 IN | BODY MASS INDEX: 18.69 KG/M2 | HEART RATE: 98 BPM | OXYGEN SATURATION: 94 % | TEMPERATURE: 98 F | DIASTOLIC BLOOD PRESSURE: 110 MMHG | WEIGHT: 119.05 LBS | SYSTOLIC BLOOD PRESSURE: 172 MMHG

## 2021-11-08 PROCEDURE — 700111 HCHG RX REV CODE 636 W/ 250 OVERRIDE (IP): Performed by: EMERGENCY MEDICINE

## 2021-11-08 PROCEDURE — 96372 THER/PROPH/DIAG INJ SC/IM: CPT

## 2021-11-08 PROCEDURE — 700111 HCHG RX REV CODE 636 W/ 250 OVERRIDE (IP)

## 2021-11-08 PROCEDURE — 99284 EMERGENCY DEPT VISIT MOD MDM: CPT

## 2021-11-08 PROCEDURE — 93971 EXTREMITY STUDY: CPT | Mod: LT

## 2021-11-08 PROCEDURE — A9270 NON-COVERED ITEM OR SERVICE: HCPCS | Performed by: EMERGENCY MEDICINE

## 2021-11-08 PROCEDURE — 700102 HCHG RX REV CODE 250 W/ 637 OVERRIDE(OP): Performed by: EMERGENCY MEDICINE

## 2021-11-08 PROCEDURE — 93971 EXTREMITY STUDY: CPT | Mod: 26,LT | Performed by: INTERNAL MEDICINE

## 2021-11-08 RX ORDER — KETOROLAC TROMETHAMINE 30 MG/ML
30 INJECTION, SOLUTION INTRAMUSCULAR; INTRAVENOUS ONCE
Status: COMPLETED | OUTPATIENT
Start: 2021-11-08 | End: 2021-11-08

## 2021-11-08 RX ORDER — KETOROLAC TROMETHAMINE 10 MG/1
10 TABLET, FILM COATED ORAL 3 TIMES DAILY PRN
Qty: 15 TABLET | Refills: 0 | Status: SHIPPED | OUTPATIENT
Start: 2021-11-08 | End: 2021-11-12

## 2021-11-08 RX ORDER — KETOROLAC TROMETHAMINE 30 MG/ML
INJECTION, SOLUTION INTRAMUSCULAR; INTRAVENOUS
Status: COMPLETED
Start: 2021-11-08 | End: 2021-11-08

## 2021-11-08 RX ADMIN — ONDANSETRON 4 MG: 4 TABLET, ORALLY DISINTEGRATING ORAL at 00:15

## 2021-11-08 RX ADMIN — KETOROLAC TROMETHAMINE 30 MG: 30 INJECTION, SOLUTION INTRAMUSCULAR at 01:00

## 2021-11-08 RX ADMIN — KETOROLAC TROMETHAMINE 30 MG: 30 INJECTION, SOLUTION INTRAMUSCULAR; INTRAVENOUS at 01:00

## 2021-11-08 RX ADMIN — DIPHENHYDRAMINE HCL 25 MG: 25 TABLET ORAL at 00:15

## 2021-11-08 NOTE — DISCHARGE INSTRUCTIONS
Elevate your arm on several pillows so that your hand is above your forearm.  Apply warm moist compresses around the forearm and then a hot rice pack over the top of that.  Toradol as needed for pain  Follow-up for dialysis as scheduled  Return if any coldness or numbness in your fingers.

## 2021-11-08 NOTE — ED NOTES
Discharge instructions reviewed with patient. AVS signed by patient. Prescriptions electronically sent to pharmacy of choice. Patient understands need for follow-up appointment with healthcare team and to return to ED for worsening symptoms. All questions answered at this time. Patient wheelchair to exit with belongings. Patient in stable condition with no signs of distress. Patient agreeable to discharge instructions.

## 2021-11-08 NOTE — ED TRIAGE NOTES
"Chief Complaint   Patient presents with   • Arm Pain     Patient states, \"Tuesday I had a procedure on my fisula on my right arm. I was told to sleep on my left side. Yesterday, my left arm started with pain and swelling.\"    • Arm Swelling       BP (!) 170/118   Pulse (!) 103   Temp 36.9 °C (98.5 °F) (Temporal)   Resp 16   Ht 1.702 m (5' 7\")   Wt 54 kg (119 lb 0.8 oz)   SpO2 99%     "

## 2021-11-08 NOTE — ED NOTES
Patient reports that pain in arm is worsening. Dr. Huntley notified. Verbal order for Toradol 30 mg IM.

## 2021-11-08 NOTE — ED PROVIDER NOTES
"ED Provider Note     Scribed for Alix Huntley D.O. by Mason Malone. 11/7/2021, 11:00 PM.     Primary care provider: Ella Matthew M.D.  Means of arrival: Walk-in         History obtained from: Patient  History limited by: None    CHIEF COMPLAINT  Chief Complaint   Patient presents with   • Arm Pain     Patient states, \"Tuesday I had a procedure on my fisula on my right arm. I was told to sleep on my left side. Yesterday, my left arm started with pain and swelling.\"    • Arm Swelling       HPI  Lily Nicole is a 25 y.o. female who presents to the emergency Department for evaluation of acute left arm pain and swelling. On 11/2/21 she had a fistula placed in her right arm and was informed to only sleep on her left side. She has since been sleeping on her left arm as advised, but yesterday developed pain and swelling in her forearm. She has ESRD from lupus and is dialyzed MWF and has not had any issues with her fistula. No chest pain, shortness of breath, or fevers. She is allergic to Norco.    REVIEW OF SYSTEMS  Pertinent positives include left arm pain and swelling. Pertinent negatives include no chest pain, shortness of breath, or fevers.   See HPI for further details. All other systems are negative.    PAST MEDICAL HISTORY  Past Medical History:   Diagnosis Date   • Anemia 01/17/2018   • AVF (arteriovenous fistula) ([facility])     Right Arm   • Chest pain    • Chest tightness    • Daytime sleepiness    • Dialysis patient ([facility])      dialysis, M,W,F Elizabeth/Joni   • Difficulty breathing    • ESRD (end stage renal disease) on dialysis ([facility]) 01/17/2018    Twan Fu   • Gasping for breath    • Heart burn    • Hypertension 01/17/2018    \"Controlled with medication\"   • Indigestion    • Lupus ([facility])    • Migraines 01/17/2018   • Painful breathing    • Palpitations    • Seizure ([facility]) 2013    from high blood pressure, reports 1 time event   • Shortness of breath    • Swelling of lower extremity    • Wheezing  "       FAMILY HISTORY  Family History   Problem Relation Age of Onset   • Diabetes Paternal Grandmother        SOCIAL HISTORY  Social History     Tobacco Use   • Smoking status: Never Smoker   • Smokeless tobacco: Never Used   Vaping Use   • Vaping Use: Never used   Substance Use Topics   • Alcohol use: No   • Drug use: No      Social History     Substance and Sexual Activity   Drug Use No       SURGICAL HISTORY  Past Surgical History:   Procedure Laterality Date   • PB BRONCHOSCOPY,DIAGNOSTIC Bilateral 5/13/2021    Procedure: BRONCHOSCOPY, BRONCHOALVEOLAR LAVAGE;  Surgeon: William Spangler M.D.;  Location: [facility];  Service: Pulmonary   • PB UPPER GI ENDOSCOPY,DIAGNOSIS N/A 3/19/2021    Procedure: GASTROSCOPY;  Surgeon: Waylon Mcqueen M.D.;  Location: [facility];  Service: Gastroenterology   • PB UPPER GI ENDOSCOPY,CTRL BLEED  3/19/2021    Procedure: GASTROSCOPY, WITH ARGON PLASMA COAGULATION;  Surgeon: Waylon Mcqueen M.D.;  Location: [facility];  Service: Gastroenterology   • PB UPPER GI ENDOSCOPY,DIAGNOSIS  3/5/2021    Procedure: GASTROSCOPY - W/HEMOSTASIS;  Surgeon: Ej Silva M.D.;  Location: [facility];  Service: Gastroenterology   • PB COLONOSCOPY,DIAGNOSTIC  1/8/2021    Procedure: COLONOSCOPY;  Surgeon: Herbert Contreras M.D.;  Location: [facility];  Service: Gastroenterology   • PB UPPER GI ENDOSCOPY,DIAGNOSIS  1/8/2021    Procedure: GASTROSCOPY;  Surgeon: Herbert Contreras M.D.;  Location: [facility];  Service: Gastroenterology   • PB UPPER GI ENDOSCOPY,CTRL BLEED  1/8/2021    Procedure: GASTROSCOPY, WITH ARGON PLASMA COAGULATION;  Surgeon: Herbert Contreras M.D.;  Location: [facility];  Service: Gastroenterology   • PB UPPER GI ENDOSCOPY,CTRL BLEED  11/12/2020    Procedure: GASTROSCOPY, WITH ARGON PLASMA COAGULATION;  Surgeon: Gadiel Whitney M.D.;  Location: [facility];  Service: Gastroenterology   • PB UPPER GI  "ENDOSCOPY,BIOPSY  11/12/2020    Procedure: GASTROSCOPY, WITH BIOPSY;  Surgeon: Gadiel Whitney M.D.;  Location: [facility];  Service: Gastroenterology   • GASTROSCOPY-ENDO  11/12/2020    Procedure: GASTROSCOPY;  Surgeon: Gadiel Whitney M.D.;  Location: [facility];  Service: Gastroenterology   • GASTROSCOPY-ENDO  9/18/2020    Procedure: GASTROSCOPY;  Surgeon: Gadiel Whitney M.D.;  Location: [facility];  Service: Gastroenterology   • GASTROSCOPY N/A 5/30/2020    Procedure: GASTROSCOPY;  Surgeon: Waylon Mcqueen M.D.;  Location: [facility];  Service: Gastroenterology   • GASTROSCOPY-ENDO  12/9/2019    Procedure: GASTROSCOPY;  Surgeon: Aaron Kam M.D.;  Location: [facility];  Service: Gastroenterology   • ANGIOPLASTY  01/17/2018    \"Right Arm AV-Fistulagram & Angioplastyx3\"   • ULI BY LAPAROSCOPY  4/5/2010    Performed by SYED MARTELL at SURGERY [facility]   • AV FISTULA CREATION Right    • OTHER      renal biopsy x 3   • OTHER      bone marrow biopsy       CURRENT MEDICATIONS  Current Outpatient Medications   Medication Instructions   • amLODIPine (NORVASC) 5 MG Tab No dose, route, or frequency recorded.   • atenolol (TENORMIN) 25 MG Tab atenolol 25 mg tablet   TAKE 1 TABLET BY MOUTH ONCE DAILY   • budesonide-formoterol (SYMBICORT) 160-4.5 MCG/ACT Aerosol Symbicort 160 mcg-4.5 mcg/actuation HFA aerosol inhaler   • carvedilol (COREG) 25 mg, Oral, 2 TIMES DAILY WITH MEALS   • cloNIDine (CATAPRES) 0.2 MG/24HR PATCH WEEKLY patch 1 Patch, Transdermal, EVERY 7 DAYS   • ferrous gluconate (FERGON) 324 (38 Fe) MG Tab ferrous gluconate 324 mg (38 mg iron) tablet   TAKE 1 TABLET BY MOUTH THREE TIMES DAILY WITH MEALS   • furosemide (LASIX) 240 mg, Oral, 2 TIMES DAILY   • hydroxychloroquine (PLAQUENIL) 200 mg, Oral, EVERY EVENING   • mycophenolate sodium (MYFORTIC) 360 MG Tablet Delayed Response tablet TAKE 2 TABLETS BY MOUTH ONCE DAILY IN THE " "MORNING AND 1 ONCE DAILY IN THE EVENING   • omeprazole (PRILOSEC) 40 MG delayed-release capsule omeprazole 40 mg capsule,delayed release   TAKE 1 CAPSULE BY MOUTH ONCE DAILY FOR 18 DAYS   • ondansetron (ZOFRAN ODT) 4 mg, Oral, EVERY 4 HOURS PRN   • oxyCODONE immediate-release (ROXICODONE) 5 MG Tab oxycodone 5 mg tablet   TAKE 1 TABLET BY MOUTH EVERY 6 HOURS AS NEEDED FOR 5 DAYS   • oxyCODONE-acetaminophen (PERCOCET) 5-325 MG Tab oxycodone-acetaminophen 5 mg-325 mg tablet   TAKE 1 TABLET BY MOUTH EVERY 4 HOURS AS NEEDED FOR UP TO 3 DAYS   • pantoprazole (PROTONIX) 40 mg, Oral, 2 TIMES DAILY   • predniSONE (DELTASONE) 5 mg, Oral, DAILY       ALLERGIES  Allergies   Allergen Reactions   • Cephalexin Rash     .   • Clindamycin Rash     .   • Methylprednisolone Unspecified     Anxious   • Metoprolol Rash     .   • Maxipime [Cefepime] Itching   • Norco [Hydrocodone-Acetaminophen] Rash and Nausea     Generalized rash   • Tape        PHYSICAL EXAM  VITAL SIGNS: BP (!) 170/118   Pulse (!) 103   Temp 36.9 °C (98.5 °F) (Temporal)   Resp 16   Ht 1.702 m (5' 7\")   Wt 54 kg (119 lb 0.8 oz)   LMP 10/25/2021 (Within Days)   SpO2 99%   BMI 18.65 kg/m²     Constitutional: Patient is an ill-appearing petite female in moderate distress from her forearm pain.  HENT: Normocephalic, atraumatic.  Oral mucosa moist.  Eyes: PERRL, EOMI  Cardiovascular: Normal heart rate and Regular rhythm. No murmur  Thorax & Lungs: Clear and equal breath sounds with good excursion. No respiratory distress, no rhonchi, wheezing or rales.   Abdomen: Soft,nontender, no rebound , guarding, palpable masses.   Skin: Warm, Dry, No erythema, No rashes.   Back: No cervical, thoracic, or lumbosacral tenderness.  Extremities: Peripheral pulses 4/4 , right upper arm with AV fistula with a palpable thrill.  Musculoskeletal: Moderate amount of swelling to left forearm. Skin is taut, tender to touch. Normal range of motion, normal capillary refill.  No erythema or " warmth.  Neurologic: Alert & oriented x 3, Normal motor function, Normal sensory function  Psychiatric: Affect normal, Judgment normal, Mood normal.     DIAGNOSTICS/PROCEDURES    RADIOLOGY/PROCEDURES  US-EXTREMITY VENOUS UPPER UNILAT LEFT   Final Result      No superficial thrombophlebitis or DVT.    Results and radiologist interpretation reviewed by me.     COURSE & MEDICAL DECISION MAKING  Pertinent Labs & Imaging studies reviewed. (See chart for details)    11:00 PM - Patient seen and evaluated at bedside. Ordered for UA-extremity venous upper unilateral left to evaluate. Patient will be treated with Percocet for her symptoms. Differential diagnoses include, but are not limited to, DVT, dependent edema    11:46 PM - Patient's nurse informed me that she is anxious, with abdominal pain and nausea after the Percocet. Patient treated with Zofran and Benadryl.    12:35 AM - Nurse informed me that patient is still having arm pain. Patient treated with Toradol.    12:41 AM - Patient was reevaluated at bedside.  Patient did get some improvement after the Toradol.  Discussed radiology results with the patient and informed them that they are reassuring without evidence of blood clot. Advised using warm compresses to alleviate the swelling. Return precautions were discussed with the patient, and they were cleared for discharge at this time. Patient was understanding and agreeable to discharge.     The patient will return for new or worsening symptoms and is stable at the time of discharge.    The patient is referred to a primary physician for blood pressure management, diabetic screening, and for all other preventative health concerns.    DISPOSITION:  Patient will be discharged home in stable condition.    FOLLOW UP:  Ella Matthew M.D.  [address] [place] [id number] [phone]    Schedule an appointment as soon as possible for a visit in 2 days  As needed, If symptoms worsen      OUTPATIENT  MEDICATIONS:  Discharge Medication List as of 11/8/2021 12:57 AM      START taking these medications    Details   ketorolac (TORADOL) 10 MG Tab Take 1 Tablet by mouth 3 times a day as needed for Moderate Pain. With food, Disp-15 Tablet, R-0, Normal             FINAL IMPRESSION  1. Left arm pain    2. Arm edema    3.  History of end-stage renal disease on dialysis  4.  History of lupus     Mason PARRA (Klause), am scribing for, and in the presence of, Alix Huntley D.O..    Electronically signed by: Mason Malone (Scribe), 11/7/2021    I, Alix Huntley D.O. personally performed the services described in this documentation, as scribed by Mason Malone in my presence, and it is both accurate and complete.    The note accurately reflects work and decisions made by me.  Alix Huntley D.O.  11/8/2021  3:45 AM

## 2021-11-08 NOTE — ED NOTES
Patient reports that she has an anxious abdominal pain and nausea. Dr. Huntley notified. New orders per MAR.

## 2021-11-12 ENCOUNTER — HOSPITAL ENCOUNTER (OUTPATIENT)
Facility: MEDICAL CENTER | Age: 26
End: 2021-11-13
Attending: EMERGENCY MEDICINE | Admitting: HOSPITALIST
Payer: COMMERCIAL

## 2021-11-12 ENCOUNTER — OUTPATIENT INFUSION SERVICES (OUTPATIENT)
Dept: ONCOLOGY | Facility: MEDICAL CENTER | Age: 26
End: 2021-11-12
Attending: INTERNAL MEDICINE
Payer: COMMERCIAL

## 2021-11-12 VITALS
TEMPERATURE: 98.2 F | DIASTOLIC BLOOD PRESSURE: 80 MMHG | HEART RATE: 93 BPM | OXYGEN SATURATION: 97 % | SYSTOLIC BLOOD PRESSURE: 123 MMHG | RESPIRATION RATE: 18 BRPM

## 2021-11-12 DIAGNOSIS — D64.9 SYMPTOMATIC ANEMIA: ICD-10-CM

## 2021-11-12 DIAGNOSIS — D64.9 ANEMIA, UNSPECIFIED TYPE: ICD-10-CM

## 2021-11-12 LAB
ABO GROUP BLD: NORMAL
ABO GROUP BLD: NORMAL
ANION GAP SERPL CALC-SCNC: 8 MMOL/L (ref 7–16)
ANISOCYTOSIS BLD QL SMEAR: ABNORMAL
ANISOCYTOSIS BLD QL SMEAR: ABNORMAL
BARCODED ABORH UBTYP: 5100
BARCODED ABORH UBTYP: 5100
BARCODED PRD CODE UBPRD: NORMAL
BARCODED PRD CODE UBPRD: NORMAL
BARCODED UNIT NUM UBUNT: NORMAL
BARCODED UNIT NUM UBUNT: NORMAL
BASO STIPL BLD QL SMEAR: NORMAL
BASOPHILS # BLD AUTO: 0.5 % (ref 0–1.8)
BASOPHILS # BLD AUTO: 0.6 % (ref 0–1.8)
BASOPHILS # BLD: 0.02 K/UL (ref 0–0.12)
BASOPHILS # BLD: 0.03 K/UL (ref 0–0.12)
BLD GP AB SCN SERPL QL: NORMAL
BLD GP AB SCN SERPL QL: NORMAL
BUN SERPL-MCNC: 26 MG/DL (ref 8–22)
CALCIUM SERPL-MCNC: 8.5 MG/DL (ref 8.4–10.2)
CHLORIDE SERPL-SCNC: 91 MMOL/L (ref 96–112)
CO2 SERPL-SCNC: 35 MMOL/L (ref 20–33)
COMMENT 1642: NORMAL
COMMENT 1642: NORMAL
COMPONENT R 8504R: NORMAL
COMPONENT R 8504R: NORMAL
CREAT SERPL-MCNC: 3.58 MG/DL (ref 0.5–1.4)
EOSINOPHIL # BLD AUTO: 0.04 K/UL (ref 0–0.51)
EOSINOPHIL # BLD AUTO: 0.04 K/UL (ref 0–0.51)
EOSINOPHIL NFR BLD: 0.8 % (ref 0–6.9)
EOSINOPHIL NFR BLD: 0.9 % (ref 0–6.9)
ERYTHROCYTE [DISTWIDTH] IN BLOOD BY AUTOMATED COUNT: 61.7 FL (ref 35.9–50)
ERYTHROCYTE [DISTWIDTH] IN BLOOD BY AUTOMATED COUNT: 62.3 FL (ref 35.9–50)
GLUCOSE SERPL-MCNC: 103 MG/DL (ref 65–99)
HCT VFR BLD AUTO: 17.5 % (ref 37–47)
HCT VFR BLD AUTO: 19.4 % (ref 37–47)
HGB BLD-MCNC: 5 G/DL (ref 12–16)
HGB BLD-MCNC: 5.6 G/DL (ref 12–16)
HYPOCHROMIA BLD QL SMEAR: ABNORMAL
IMM GRANULOCYTES # BLD AUTO: 0.03 K/UL (ref 0–0.11)
IMM GRANULOCYTES # BLD AUTO: 0.1 K/UL (ref 0–0.11)
IMM GRANULOCYTES NFR BLD AUTO: 0.7 % (ref 0–0.9)
IMM GRANULOCYTES NFR BLD AUTO: 2 % (ref 0–0.9)
LYMPHOCYTES # BLD AUTO: 0.41 K/UL (ref 1–4.8)
LYMPHOCYTES # BLD AUTO: 0.5 K/UL (ref 1–4.8)
LYMPHOCYTES NFR BLD: 11.4 % (ref 22–41)
LYMPHOCYTES NFR BLD: 8 % (ref 22–41)
MACROCYTES BLD QL SMEAR: ABNORMAL
MCH RBC QN AUTO: 26.8 PG (ref 27–33)
MCH RBC QN AUTO: 26.9 PG (ref 27–33)
MCHC RBC AUTO-ENTMCNC: 28.6 G/DL (ref 33.6–35)
MCHC RBC AUTO-ENTMCNC: 28.9 G/DL (ref 33.6–35)
MCV RBC AUTO: 92.8 FL (ref 81.4–97.8)
MCV RBC AUTO: 94.1 FL (ref 81.4–97.8)
MICROCYTES BLD QL SMEAR: ABNORMAL
MICROCYTES BLD QL SMEAR: ABNORMAL
MONOCYTES # BLD AUTO: 0.29 K/UL (ref 0–0.85)
MONOCYTES # BLD AUTO: 0.33 K/UL (ref 0–0.85)
MONOCYTES NFR BLD AUTO: 5.7 % (ref 0–13.4)
MONOCYTES NFR BLD AUTO: 7.5 % (ref 0–13.4)
MORPHOLOGY BLD-IMP: NORMAL
NEUTROPHILS # BLD AUTO: 3.46 K/UL (ref 2–7.15)
NEUTROPHILS # BLD AUTO: 4.23 K/UL (ref 2–7.15)
NEUTROPHILS NFR BLD: 79 % (ref 44–72)
NEUTROPHILS NFR BLD: 82.9 % (ref 44–72)
NRBC # BLD AUTO: 0 K/UL
NRBC # BLD AUTO: 0 K/UL
NRBC BLD-RTO: 0 /100 WBC
NRBC BLD-RTO: 0 /100 WBC
PLATELET # BLD AUTO: 156 K/UL (ref 164–446)
PLATELET # BLD AUTO: 169 K/UL (ref 164–446)
PLATELET BLD QL SMEAR: NORMAL
PLATELET BLD QL SMEAR: NORMAL
PMV BLD AUTO: 10.4 FL (ref 9–12.9)
PMV BLD AUTO: 10.5 FL (ref 9–12.9)
POIKILOCYTOSIS BLD QL SMEAR: NORMAL
POLYCHROMASIA BLD QL SMEAR: NORMAL
POLYCHROMASIA BLD QL SMEAR: NORMAL
POTASSIUM SERPL-SCNC: 4.2 MMOL/L (ref 3.6–5.5)
PRODUCT TYPE UPROD: NORMAL
PRODUCT TYPE UPROD: NORMAL
RBC # BLD AUTO: 1.86 M/UL (ref 4.2–5.4)
RBC # BLD AUTO: 2.09 M/UL (ref 4.2–5.4)
RBC BLD AUTO: PRESENT
RBC BLD AUTO: PRESENT
RH BLD: NORMAL
RH BLD: NORMAL
SODIUM SERPL-SCNC: 134 MMOL/L (ref 135–145)
STOMATOCYTES BLD QL SMEAR: NORMAL
UNIT STATUS USTAT: NORMAL
UNIT STATUS USTAT: NORMAL
WBC # BLD AUTO: 4.4 K/UL (ref 4.8–10.8)
WBC # BLD AUTO: 5.1 K/UL (ref 4.8–10.8)

## 2021-11-12 PROCEDURE — 86922 COMPATIBILITY TEST ANTIGLOB: CPT | Mod: 91

## 2021-11-12 PROCEDURE — 86901 BLOOD TYPING SEROLOGIC RH(D): CPT

## 2021-11-12 PROCEDURE — 85025 COMPLETE CBC W/AUTO DIFF WBC: CPT | Mod: 91

## 2021-11-12 PROCEDURE — P9016 RBC LEUKOCYTES REDUCED: HCPCS

## 2021-11-12 PROCEDURE — 86850 RBC ANTIBODY SCREEN: CPT | Mod: 91

## 2021-11-12 PROCEDURE — 86850 RBC ANTIBODY SCREEN: CPT

## 2021-11-12 PROCEDURE — 80048 BASIC METABOLIC PNL TOTAL CA: CPT

## 2021-11-12 PROCEDURE — 36415 COLL VENOUS BLD VENIPUNCTURE: CPT

## 2021-11-12 PROCEDURE — A9270 NON-COVERED ITEM OR SERVICE: HCPCS | Performed by: EMERGENCY MEDICINE

## 2021-11-12 PROCEDURE — 99220 PR INITIAL OBSERVATION CARE,LEVL III: CPT | Mod: AI | Performed by: HOSPITALIST

## 2021-11-12 PROCEDURE — 83550 IRON BINDING TEST: CPT

## 2021-11-12 PROCEDURE — 96374 THER/PROPH/DIAG INJ IV PUSH: CPT

## 2021-11-12 PROCEDURE — 700111 HCHG RX REV CODE 636 W/ 250 OVERRIDE (IP): Performed by: EMERGENCY MEDICINE

## 2021-11-12 PROCEDURE — 700102 HCHG RX REV CODE 250 W/ 637 OVERRIDE(OP): Performed by: EMERGENCY MEDICINE

## 2021-11-12 PROCEDURE — 83540 ASSAY OF IRON: CPT

## 2021-11-12 PROCEDURE — 96375 TX/PRO/DX INJ NEW DRUG ADDON: CPT

## 2021-11-12 PROCEDURE — 85025 COMPLETE CBC W/AUTO DIFF WBC: CPT

## 2021-11-12 PROCEDURE — 86901 BLOOD TYPING SEROLOGIC RH(D): CPT | Mod: 91

## 2021-11-12 PROCEDURE — 99285 EMERGENCY DEPT VISIT HI MDM: CPT

## 2021-11-12 PROCEDURE — G0378 HOSPITAL OBSERVATION PER HR: HCPCS

## 2021-11-12 PROCEDURE — 86922 COMPATIBILITY TEST ANTIGLOB: CPT

## 2021-11-12 PROCEDURE — 36430 TRANSFUSION BLD/BLD COMPNT: CPT

## 2021-11-12 PROCEDURE — 82728 ASSAY OF FERRITIN: CPT

## 2021-11-12 PROCEDURE — 86900 BLOOD TYPING SEROLOGIC ABO: CPT

## 2021-11-12 RX ORDER — ACETAMINOPHEN 500 MG
500 TABLET ORAL ONCE
Status: COMPLETED | OUTPATIENT
Start: 2021-11-12 | End: 2021-11-12

## 2021-11-12 RX ORDER — AMLODIPINE BESYLATE 5 MG/1
5 TABLET ORAL
Status: DISCONTINUED | OUTPATIENT
Start: 2021-11-13 | End: 2021-11-13 | Stop reason: HOSPADM

## 2021-11-12 RX ORDER — 0.9 % SODIUM CHLORIDE 0.9 %
10 VIAL (ML) INJECTION PRN
Status: CANCELLED | OUTPATIENT
Start: 2021-11-12

## 2021-11-12 RX ORDER — ONDANSETRON 2 MG/ML
4 INJECTION INTRAMUSCULAR; INTRAVENOUS ONCE
Status: COMPLETED | OUTPATIENT
Start: 2021-11-12 | End: 2021-11-12

## 2021-11-12 RX ORDER — OMEPRAZOLE 20 MG/1
20 CAPSULE, DELAYED RELEASE ORAL 2 TIMES DAILY
Status: DISCONTINUED | OUTPATIENT
Start: 2021-11-13 | End: 2021-11-12

## 2021-11-12 RX ORDER — 0.9 % SODIUM CHLORIDE 0.9 %
3 VIAL (ML) INJECTION PRN
Status: CANCELLED | OUTPATIENT
Start: 2021-11-12

## 2021-11-12 RX ORDER — HYDROXYCHLOROQUINE SULFATE 200 MG/1
200 TABLET, FILM COATED ORAL EVERY EVENING
Status: DISCONTINUED | OUTPATIENT
Start: 2021-11-13 | End: 2021-11-13 | Stop reason: HOSPADM

## 2021-11-12 RX ORDER — DIPHENHYDRAMINE HYDROCHLORIDE 50 MG/ML
25 INJECTION INTRAMUSCULAR; INTRAVENOUS PRN
Status: CANCELLED | OUTPATIENT
Start: 2021-11-12

## 2021-11-12 RX ORDER — CLONIDINE 0.2 MG/24H
1 PATCH, EXTENDED RELEASE TRANSDERMAL
Status: DISCONTINUED | OUTPATIENT
Start: 2021-11-14 | End: 2021-11-13 | Stop reason: HOSPADM

## 2021-11-12 RX ORDER — SODIUM CHLORIDE 9 MG/ML
INJECTION, SOLUTION INTRAVENOUS CONTINUOUS
Status: CANCELLED | OUTPATIENT
Start: 2021-11-12

## 2021-11-12 RX ORDER — PROMETHAZINE HYDROCHLORIDE 25 MG/1
12.5-25 SUPPOSITORY RECTAL EVERY 4 HOURS PRN
Status: DISCONTINUED | OUTPATIENT
Start: 2021-11-12 | End: 2021-11-13 | Stop reason: HOSPADM

## 2021-11-12 RX ORDER — BUDESONIDE AND FORMOTEROL FUMARATE DIHYDRATE 160; 4.5 UG/1; UG/1
2 AEROSOL RESPIRATORY (INHALATION) 2 TIMES DAILY
Status: DISCONTINUED | OUTPATIENT
Start: 2021-11-12 | End: 2021-11-12

## 2021-11-12 RX ORDER — PANTOPRAZOLE SODIUM 40 MG/1
40 TABLET, DELAYED RELEASE ORAL 2 TIMES DAILY
Status: DISCONTINUED | OUTPATIENT
Start: 2021-11-12 | End: 2021-11-12

## 2021-11-12 RX ORDER — HEPARIN SODIUM (PORCINE) LOCK FLUSH IV SOLN 100 UNIT/ML 100 UNIT/ML
500 SOLUTION INTRAVENOUS PRN
Status: CANCELLED | OUTPATIENT
Start: 2021-11-12

## 2021-11-12 RX ORDER — MYCOPHENOLIC ACID 360 MG/1
360 TABLET, DELAYED RELEASE ORAL DAILY
Status: DISCONTINUED | OUTPATIENT
Start: 2021-11-13 | End: 2021-11-12

## 2021-11-12 RX ORDER — PROCHLORPERAZINE EDISYLATE 5 MG/ML
5-10 INJECTION INTRAMUSCULAR; INTRAVENOUS EVERY 4 HOURS PRN
Status: DISCONTINUED | OUTPATIENT
Start: 2021-11-12 | End: 2021-11-13 | Stop reason: HOSPADM

## 2021-11-12 RX ORDER — ACETAMINOPHEN 325 MG/1
650 TABLET ORAL ONCE
Status: CANCELLED | OUTPATIENT
Start: 2021-11-12

## 2021-11-12 RX ORDER — ONDANSETRON 2 MG/ML
4 INJECTION INTRAMUSCULAR; INTRAVENOUS EVERY 4 HOURS PRN
Status: DISCONTINUED | OUTPATIENT
Start: 2021-11-12 | End: 2021-11-13 | Stop reason: HOSPADM

## 2021-11-12 RX ORDER — ACETAMINOPHEN 325 MG/1
650 TABLET ORAL PRN
Status: CANCELLED | OUTPATIENT
Start: 2021-11-12

## 2021-11-12 RX ORDER — 0.9 % SODIUM CHLORIDE 0.9 %
VIAL (ML) INJECTION PRN
Status: CANCELLED | OUTPATIENT
Start: 2021-11-12

## 2021-11-12 RX ORDER — POLYETHYLENE GLYCOL 3350 17 G/17G
1 POWDER, FOR SOLUTION ORAL
Status: DISCONTINUED | OUTPATIENT
Start: 2021-11-12 | End: 2021-11-13 | Stop reason: HOSPADM

## 2021-11-12 RX ORDER — PROMETHAZINE HYDROCHLORIDE 25 MG/1
12.5-25 TABLET ORAL EVERY 4 HOURS PRN
Status: DISCONTINUED | OUTPATIENT
Start: 2021-11-12 | End: 2021-11-13 | Stop reason: HOSPADM

## 2021-11-12 RX ORDER — ATENOLOL 25 MG/1
25 TABLET ORAL
Status: DISCONTINUED | OUTPATIENT
Start: 2021-11-13 | End: 2021-11-13 | Stop reason: HOSPADM

## 2021-11-12 RX ORDER — AMOXICILLIN 250 MG
2 CAPSULE ORAL 2 TIMES DAILY
Status: DISCONTINUED | OUTPATIENT
Start: 2021-11-13 | End: 2021-11-13 | Stop reason: HOSPADM

## 2021-11-12 RX ORDER — MYCOPHENOLATE MOFETIL 250 MG/1
500 CAPSULE ORAL EVERY EVENING
Status: DISCONTINUED | OUTPATIENT
Start: 2021-11-13 | End: 2021-11-13 | Stop reason: HOSPADM

## 2021-11-12 RX ORDER — ONDANSETRON 4 MG/1
4 TABLET, ORALLY DISINTEGRATING ORAL EVERY 4 HOURS PRN
Status: DISCONTINUED | OUTPATIENT
Start: 2021-11-12 | End: 2021-11-13 | Stop reason: HOSPADM

## 2021-11-12 RX ORDER — OXYCODONE HYDROCHLORIDE 5 MG/1
5 TABLET ORAL EVERY 4 HOURS PRN
Status: DISCONTINUED | OUTPATIENT
Start: 2021-11-12 | End: 2021-11-12

## 2021-11-12 RX ORDER — OMEPRAZOLE 20 MG/1
20 CAPSULE, DELAYED RELEASE ORAL 2 TIMES DAILY
Status: DISCONTINUED | OUTPATIENT
Start: 2021-11-13 | End: 2021-11-13 | Stop reason: HOSPADM

## 2021-11-12 RX ORDER — BISACODYL 10 MG
10 SUPPOSITORY, RECTAL RECTAL
Status: DISCONTINUED | OUTPATIENT
Start: 2021-11-12 | End: 2021-11-13 | Stop reason: HOSPADM

## 2021-11-12 RX ORDER — FUROSEMIDE 40 MG/1
240 TABLET ORAL 2 TIMES DAILY
Status: DISCONTINUED | OUTPATIENT
Start: 2021-11-12 | End: 2021-11-12

## 2021-11-12 RX ORDER — DIPHENHYDRAMINE HCL 25 MG
25 TABLET ORAL ONCE
Status: CANCELLED | OUTPATIENT
Start: 2021-11-12 | End: 2021-11-12

## 2021-11-12 RX ORDER — HYDROMORPHONE HYDROCHLORIDE 1 MG/ML
1 INJECTION, SOLUTION INTRAMUSCULAR; INTRAVENOUS; SUBCUTANEOUS ONCE
Status: COMPLETED | OUTPATIENT
Start: 2021-11-12 | End: 2021-11-12

## 2021-11-12 RX ORDER — OMEPRAZOLE 20 MG/1
20 CAPSULE, DELAYED RELEASE ORAL DAILY
Status: DISCONTINUED | OUTPATIENT
Start: 2021-11-13 | End: 2021-11-12

## 2021-11-12 RX ORDER — PREDNISONE 5 MG/1
5 TABLET ORAL DAILY
Status: DISCONTINUED | OUTPATIENT
Start: 2021-11-13 | End: 2021-11-13 | Stop reason: HOSPADM

## 2021-11-12 RX ORDER — DIPHENHYDRAMINE HYDROCHLORIDE 50 MG/ML
25 INJECTION INTRAMUSCULAR; INTRAVENOUS ONCE
Status: COMPLETED | OUTPATIENT
Start: 2021-11-12 | End: 2021-11-12

## 2021-11-12 RX ADMIN — ACETAMINOPHEN 500 MG: 500 TABLET ORAL at 22:56

## 2021-11-12 RX ADMIN — DIPHENHYDRAMINE HYDROCHLORIDE 25 MG: 50 INJECTION INTRAMUSCULAR; INTRAVENOUS at 22:56

## 2021-11-12 RX ADMIN — ONDANSETRON 4 MG: 2 INJECTION INTRAMUSCULAR; INTRAVENOUS at 21:03

## 2021-11-12 RX ADMIN — HYDROMORPHONE HYDROCHLORIDE 1 MG: 1 INJECTION, SOLUTION INTRAMUSCULAR; INTRAVENOUS; SUBCUTANEOUS at 21:03

## 2021-11-12 ASSESSMENT — ENCOUNTER SYMPTOMS
COUGH: 0
FLANK PAIN: 0
NERVOUS/ANXIOUS: 0
STRIDOR: 0
DIZZINESS: 0
MYALGIAS: 0
HEADACHES: 1
FEVER: 0
SHORTNESS OF BREATH: 0
FOCAL WEAKNESS: 0
CHILLS: 0
EYE DISCHARGE: 0
BRUISES/BLEEDS EASILY: 0
VOMITING: 0
ABDOMINAL PAIN: 0
DIZZINESS: 1
EYE REDNESS: 0

## 2021-11-12 ASSESSMENT — FIBROSIS 4 INDEX: FIB4 SCORE: 0.98

## 2021-11-12 NOTE — PROGRESS NOTES
Patient arrived ambulatory to [facility] for CBC/COD.  Patient denies any acute changes.  Labs drawn from left a/c, gauze/coban placed.  Confirmed next appointment on Sunday and patient ambulated out of clinic in no apparent distress.    Tabitha Lagos from Lab called with critical result of Hemoglobin 5.6 and Hematocrit 19.4 at 1540. Critical lab result read back to Tabitha Lagos.   Dr. Garcia notified of critical lab result at 1556.    Called patient to inform her of critical levels and recommended she go to ED.  Per patient, she requested to keep her Sunday appointment and will call and cancel tomorrow if needed but will also go to ED today to be evaluated.  Dr. Garcia notified of patient plan.

## 2021-11-13 VITALS
RESPIRATION RATE: 16 BRPM | OXYGEN SATURATION: 100 % | DIASTOLIC BLOOD PRESSURE: 85 MMHG | TEMPERATURE: 98.6 F | SYSTOLIC BLOOD PRESSURE: 129 MMHG | HEIGHT: 67 IN | WEIGHT: 119.27 LBS | HEART RATE: 84 BPM | BODY MASS INDEX: 18.72 KG/M2

## 2021-11-13 LAB
ALBUMIN SERPL BCP-MCNC: 2.7 G/DL (ref 3.2–4.9)
ALBUMIN/GLOB SERPL: 0.6 G/DL
ALP SERPL-CCNC: 73 U/L (ref 30–99)
ALT SERPL-CCNC: 13 U/L (ref 2–50)
ANION GAP SERPL CALC-SCNC: 9 MMOL/L (ref 7–16)
AST SERPL-CCNC: 24 U/L (ref 12–45)
BARCODED ABORH UBTYP: 5100
BARCODED ABORH UBTYP: 5100
BARCODED PRD CODE UBPRD: NORMAL
BARCODED PRD CODE UBPRD: NORMAL
BARCODED UNIT NUM UBUNT: NORMAL
BARCODED UNIT NUM UBUNT: NORMAL
BASOPHILS # BLD AUTO: 0.9 % (ref 0–1.8)
BASOPHILS # BLD: 0.03 K/UL (ref 0–0.12)
BILIRUB SERPL-MCNC: 0.5 MG/DL (ref 0.1–1.5)
BUN SERPL-MCNC: 35 MG/DL (ref 8–22)
CALCIUM SERPL-MCNC: 8.9 MG/DL (ref 8.4–10.2)
CHLORIDE SERPL-SCNC: 93 MMOL/L (ref 96–112)
CO2 SERPL-SCNC: 34 MMOL/L (ref 20–33)
COMPONENT R 8504R: NORMAL
COMPONENT R 8504R: NORMAL
CREAT SERPL-MCNC: 4.39 MG/DL (ref 0.5–1.4)
EOSINOPHIL # BLD AUTO: 0.09 K/UL (ref 0–0.51)
EOSINOPHIL NFR BLD: 2.8 % (ref 0–6.9)
ERYTHROCYTE [DISTWIDTH] IN BLOOD BY AUTOMATED COUNT: 59.4 FL (ref 35.9–50)
FERRITIN SERPL-MCNC: 752 NG/ML (ref 10–291)
GLOBULIN SER CALC-MCNC: 4.6 G/DL (ref 1.9–3.5)
GLUCOSE SERPL-MCNC: 87 MG/DL (ref 65–99)
HCT VFR BLD AUTO: 23 % (ref 37–47)
HGB BLD-MCNC: 6.7 G/DL (ref 12–16)
IMM GRANULOCYTES # BLD AUTO: 0.03 K/UL (ref 0–0.11)
IMM GRANULOCYTES NFR BLD AUTO: 0.9 % (ref 0–0.9)
IRON SATN MFR SERPL: 18 % (ref 15–55)
IRON SERPL-MCNC: 26 UG/DL (ref 40–170)
LYMPHOCYTES # BLD AUTO: 0.55 K/UL (ref 1–4.8)
LYMPHOCYTES NFR BLD: 17.4 % (ref 22–41)
MAGNESIUM SERPL-MCNC: 1.6 MG/DL (ref 1.5–2.5)
MCH RBC QN AUTO: 27 PG (ref 27–33)
MCHC RBC AUTO-ENTMCNC: 29.1 G/DL (ref 33.6–35)
MCV RBC AUTO: 92.7 FL (ref 81.4–97.8)
MONOCYTES # BLD AUTO: 0.36 K/UL (ref 0–0.85)
MONOCYTES NFR BLD AUTO: 11.4 % (ref 0–13.4)
NEUTROPHILS # BLD AUTO: 2.1 K/UL (ref 2–7.15)
NEUTROPHILS NFR BLD: 66.6 % (ref 44–72)
NRBC # BLD AUTO: 0 K/UL
NRBC BLD-RTO: 0 /100 WBC
PLATELET # BLD AUTO: 115 K/UL (ref 164–446)
PMV BLD AUTO: 10.5 FL (ref 9–12.9)
POTASSIUM SERPL-SCNC: 4.7 MMOL/L (ref 3.6–5.5)
PRODUCT TYPE UPROD: NORMAL
PRODUCT TYPE UPROD: NORMAL
PROT SERPL-MCNC: 7.3 G/DL (ref 6–8.2)
RBC # BLD AUTO: 2.48 M/UL (ref 4.2–5.4)
SODIUM SERPL-SCNC: 136 MMOL/L (ref 135–145)
TIBC SERPL-MCNC: 141 UG/DL (ref 250–450)
UIBC SERPL-MCNC: 115 UG/DL (ref 110–370)
UNIT STATUS USTAT: NORMAL
UNIT STATUS USTAT: NORMAL
WBC # BLD AUTO: 3.2 K/UL (ref 4.8–10.8)

## 2021-11-13 PROCEDURE — P9016 RBC LEUKOCYTES REDUCED: HCPCS

## 2021-11-13 PROCEDURE — 90935 HEMODIALYSIS ONE EVALUATION: CPT

## 2021-11-13 PROCEDURE — 36415 COLL VENOUS BLD VENIPUNCTURE: CPT

## 2021-11-13 PROCEDURE — 700111 HCHG RX REV CODE 636 W/ 250 OVERRIDE (IP): Performed by: INTERNAL MEDICINE

## 2021-11-13 PROCEDURE — 700111 HCHG RX REV CODE 636 W/ 250 OVERRIDE (IP): Performed by: HOSPITALIST

## 2021-11-13 PROCEDURE — 700102 HCHG RX REV CODE 250 W/ 637 OVERRIDE(OP): Performed by: HOSPITALIST

## 2021-11-13 PROCEDURE — A9270 NON-COVERED ITEM OR SERVICE: HCPCS | Performed by: HOSPITALIST

## 2021-11-13 PROCEDURE — 83735 ASSAY OF MAGNESIUM: CPT

## 2021-11-13 PROCEDURE — 36430 TRANSFUSION BLD/BLD COMPNT: CPT

## 2021-11-13 PROCEDURE — G0378 HOSPITAL OBSERVATION PER HR: HCPCS

## 2021-11-13 PROCEDURE — 700102 HCHG RX REV CODE 250 W/ 637 OVERRIDE(OP): Performed by: INTERNAL MEDICINE

## 2021-11-13 PROCEDURE — A9270 NON-COVERED ITEM OR SERVICE: HCPCS | Performed by: INTERNAL MEDICINE

## 2021-11-13 PROCEDURE — 99217 PR OBSERVATION CARE DISCHARGE: CPT | Performed by: INTERNAL MEDICINE

## 2021-11-13 PROCEDURE — C9113 INJ PANTOPRAZOLE SODIUM, VIA: HCPCS | Performed by: HOSPITALIST

## 2021-11-13 PROCEDURE — 96375 TX/PRO/DX INJ NEW DRUG ADDON: CPT

## 2021-11-13 PROCEDURE — 700105 HCHG RX REV CODE 258: Performed by: INTERNAL MEDICINE

## 2021-11-13 PROCEDURE — 80053 COMPREHEN METABOLIC PANEL: CPT

## 2021-11-13 PROCEDURE — 85025 COMPLETE CBC W/AUTO DIFF WBC: CPT

## 2021-11-13 PROCEDURE — 96376 TX/PRO/DX INJ SAME DRUG ADON: CPT

## 2021-11-13 RX ORDER — SODIUM CHLORIDE 9 MG/ML
INJECTION, SOLUTION INTRAVENOUS CONTINUOUS
Status: DISCONTINUED | OUTPATIENT
Start: 2021-11-13 | End: 2021-11-13 | Stop reason: HOSPADM

## 2021-11-13 RX ORDER — HYDROMORPHONE HYDROCHLORIDE 1 MG/ML
1 INJECTION, SOLUTION INTRAMUSCULAR; INTRAVENOUS; SUBCUTANEOUS
Status: DISCONTINUED | OUTPATIENT
Start: 2021-11-13 | End: 2021-11-13 | Stop reason: HOSPADM

## 2021-11-13 RX ORDER — LORAZEPAM 2 MG/ML
1 INJECTION INTRAMUSCULAR ONCE
Status: COMPLETED | OUTPATIENT
Start: 2021-11-13 | End: 2021-11-13

## 2021-11-13 RX ORDER — DIPHENHYDRAMINE HYDROCHLORIDE 50 MG/ML
25 INJECTION INTRAMUSCULAR; INTRAVENOUS ONCE
Status: COMPLETED | OUTPATIENT
Start: 2021-11-13 | End: 2021-11-13

## 2021-11-13 RX ORDER — PANTOPRAZOLE SODIUM 40 MG/10ML
40 INJECTION, POWDER, LYOPHILIZED, FOR SOLUTION INTRAVENOUS ONCE
Status: COMPLETED | OUTPATIENT
Start: 2021-11-13 | End: 2021-11-13

## 2021-11-13 RX ORDER — ACETAMINOPHEN 500 MG
500 TABLET ORAL EVERY 6 HOURS PRN
Status: DISCONTINUED | OUTPATIENT
Start: 2021-11-13 | End: 2021-11-13 | Stop reason: HOSPADM

## 2021-11-13 RX ORDER — LORAZEPAM 2 MG/ML
0.5 INJECTION INTRAMUSCULAR EVERY 4 HOURS PRN
Status: DISCONTINUED | OUTPATIENT
Start: 2021-11-13 | End: 2021-11-13 | Stop reason: HOSPADM

## 2021-11-13 RX ORDER — SODIUM CHLORIDE 9 MG/ML
INJECTION, SOLUTION INTRAVENOUS CONTINUOUS
Status: DISCONTINUED | OUTPATIENT
Start: 2021-11-13 | End: 2021-11-13

## 2021-11-13 RX ORDER — DIPHENHYDRAMINE HCL 25 MG
25 TABLET ORAL EVERY 6 HOURS PRN
Status: DISCONTINUED | OUTPATIENT
Start: 2021-11-13 | End: 2021-11-13

## 2021-11-13 RX ORDER — ACETAMINOPHEN 650 MG/1
650 SUPPOSITORY RECTAL EVERY 6 HOURS
Status: DISCONTINUED | OUTPATIENT
Start: 2021-11-13 | End: 2021-11-13

## 2021-11-13 RX ORDER — ACETAMINOPHEN 650 MG/1
650 SUPPOSITORY RECTAL EVERY 6 HOURS PRN
Status: DISCONTINUED | OUTPATIENT
Start: 2021-11-18 | End: 2021-11-13

## 2021-11-13 RX ADMIN — ACETAMINOPHEN 500 MG: 500 TABLET, FILM COATED ORAL at 12:15

## 2021-11-13 RX ADMIN — DIPHENHYDRAMINE HYDROCHLORIDE 25 MG: 50 INJECTION INTRAMUSCULAR; INTRAVENOUS at 12:41

## 2021-11-13 RX ADMIN — OMEPRAZOLE 20 MG: 20 CAPSULE, DELAYED RELEASE ORAL at 05:17

## 2021-11-13 RX ADMIN — LORAZEPAM 1 MG: 2 INJECTION INTRAMUSCULAR; INTRAVENOUS at 04:11

## 2021-11-13 RX ADMIN — SODIUM CHLORIDE: 9 INJECTION, SOLUTION INTRAVENOUS at 12:45

## 2021-11-13 RX ADMIN — SENNOSIDES AND DOCUSATE SODIUM 2 TABLET: 50; 8.6 TABLET ORAL at 05:17

## 2021-11-13 RX ADMIN — AMLODIPINE BESYLATE 5 MG: 5 TABLET ORAL at 05:17

## 2021-11-13 RX ADMIN — LORAZEPAM 0.5 MG: 2 INJECTION INTRAMUSCULAR; INTRAVENOUS at 12:27

## 2021-11-13 RX ADMIN — EPOETIN ALFA-EPBX 3000 UNITS: 3000 INJECTION, SOLUTION INTRAVENOUS; SUBCUTANEOUS at 12:12

## 2021-11-13 RX ADMIN — ONDANSETRON 4 MG: 2 INJECTION INTRAMUSCULAR; INTRAVENOUS at 12:18

## 2021-11-13 RX ADMIN — PANTOPRAZOLE SODIUM 40 MG: 40 INJECTION, POWDER, LYOPHILIZED, FOR SOLUTION INTRAVENOUS at 04:11

## 2021-11-13 RX ADMIN — SODIUM CHLORIDE: 9 INJECTION, SOLUTION INTRAVENOUS at 11:26

## 2021-11-13 RX ADMIN — ATENOLOL 25 MG: 25 TABLET ORAL at 05:17

## 2021-11-13 RX ADMIN — PREDNISONE 5 MG: 5 TABLET ORAL at 05:17

## 2021-11-13 ASSESSMENT — LIFESTYLE VARIABLES
CONSUMPTION TOTAL: NEGATIVE
HAVE PEOPLE ANNOYED YOU BY CRITICIZING YOUR DRINKING: NO
ON A TYPICAL DAY WHEN YOU DRINK ALCOHOL HOW MANY DRINKS DO YOU HAVE: 0
TOTAL SCORE: 0
TOTAL SCORE: 0
EVER FELT BAD OR GUILTY ABOUT YOUR DRINKING: NO
HOW MANY TIMES IN THE PAST YEAR HAVE YOU HAD 5 OR MORE DRINKS IN A DAY: 0
EVER HAD A DRINK FIRST THING IN THE MORNING TO STEADY YOUR NERVES TO GET RID OF A HANGOVER: NO
HAVE YOU EVER FELT YOU SHOULD CUT DOWN ON YOUR DRINKING: NO
AVERAGE NUMBER OF DAYS PER WEEK YOU HAVE A DRINK CONTAINING ALCOHOL: 0
ALCOHOL_USE: NO
TOTAL SCORE: 0

## 2021-11-13 ASSESSMENT — COGNITIVE AND FUNCTIONAL STATUS - GENERAL
SUGGESTED CMS G CODE MODIFIER MOBILITY: CK
TURNING FROM BACK TO SIDE WHILE IN FLAT BAD: A LITTLE
MOVING FROM LYING ON BACK TO SITTING ON SIDE OF FLAT BED: A LITTLE
WALKING IN HOSPITAL ROOM: A LITTLE
DAILY ACTIVITIY SCORE: 24
MOVING TO AND FROM BED TO CHAIR: A LITTLE
SUGGESTED CMS G CODE MODIFIER DAILY ACTIVITY: CH
CLIMB 3 TO 5 STEPS WITH RAILING: A LITTLE
MOBILITY SCORE: 18
STANDING UP FROM CHAIR USING ARMS: A LITTLE

## 2021-11-13 ASSESSMENT — PAIN DESCRIPTION - PAIN TYPE
TYPE: ACUTE PAIN

## 2021-11-13 NOTE — CARE PLAN
Problem: Pain - Standard  Goal: Alleviation of pain or a reduction in pain to the patient’s comfort goal  11/13/2021 0432 by Navin Lombardi, R.N.  Outcome: Progressing  11/13/2021 0432 by Navin Lombardi, R.N.  Outcome: Progressing     Problem: Knowledge Deficit - Standard  Goal: Patient and family/care givers will demonstrate understanding of plan of care, disease process/condition, diagnostic tests and medications  11/13/2021 0432 by Navin Lombardi, R.N.  Outcome: Progressing  11/13/2021 0432 by Navin Lombardi, R.N.  Outcome: Progressing   The patient is Stable - Low risk of patient condition declining or worsening    Shift Goals  Clinical Goals: recive blood transfustion     Progress made toward(s) clinical / shift goals:  all goals     Patient is not progressing towards the following goals:

## 2021-11-13 NOTE — ED PROVIDER NOTES
ED Provider Note  Primary care provider: Ella Matthew M.D.  Means of arrival: private vehicle  History obtained from: patient  History limited by: none    CHIEF COMPLAINT  Chief Complaint   Patient presents with   • Sent by MD     was at the [facility] center for lab draw, Hgb is 5   • Low Back Pain     x 1 day       HPI  Lily Nicole is a 25 y.o. female who presents to the Emergency Department for evaluation of abnormal labs.  Patient has end-stage renal disease secondary to lupus and has frequent bouts of anemia requiring transfusion.  Today she had a lab draw which demonstrated her hemoglobin was 5 and therefore she was sent here for transfusion.  Patient has had multiple transfusions in the past.  Denies any known active bleeding reported stool.  She reports that she is otherwise feeling well.  Is complaining of some lower back pain which she reports is normal for her.  She describes it as moderate and throbbing.  Denies shortness of breath from her baseline, she is on baseline oxygen.    REVIEW OF SYSTEMS  Review of Systems   Constitutional: Negative for chills and fever.   Respiratory: Negative for shortness of breath.    Neurological: Negative for dizziness.   All other systems reviewed and are negative.        PAST MEDICAL HISTORY   has a past medical history of Anemia (01/17/2018), AVF (arteriovenous fistula) ([facility]), Chest pain, Chest tightness, Daytime sleepiness, Dialysis patient ([facility]), Difficulty breathing, ESRD (end stage renal disease) on dialysis ([facility]) (01/17/2018), Gasping for breath, Heart burn, Hypertension (01/17/2018), Indigestion, Lupus ([facility]), Migraines (01/17/2018), Painful breathing, Palpitations, Seizure ([facility]) (2013), Shortness of breath, Swelling of lower extremity, and Wheezing.    SURGICAL HISTORY   has a past surgical history that includes ronak by laparoscopy (4/5/2010); av fistula creation (Right); angioplasty (01/17/2018); other; other; gastroscopy-endo (12/9/2019);  gastroscopy (N/A, 5/30/2020); gastroscopy-endo (9/18/2020); upper gi endoscopy,ctrl bleed (11/12/2020); upper gi endoscopy,biopsy (11/12/2020); gastroscopy-endo (11/12/2020); colonoscopy,diagnostic (1/8/2021); upper gi endoscopy,diagnosis (1/8/2021); upper gi endoscopy,ctrl bleed (1/8/2021); upper gi endoscopy,diagnosis (3/5/2021); upper gi endoscopy,diagnosis (N/A, 3/19/2021); upper gi endoscopy,ctrl bleed (3/19/2021); and bronchoscopy,diagnostic (Bilateral, 5/13/2021).    SOCIAL HISTORY  Social History     Tobacco Use   • Smoking status: Never Smoker   • Smokeless tobacco: Never Used   Vaping Use   • Vaping Use: Never used   Substance Use Topics   • Alcohol use: No   • Drug use: No      Social History     Substance and Sexual Activity   Drug Use No       FAMILY HISTORY  Family History   Problem Relation Age of Onset   • Diabetes Paternal Grandmother        CURRENT MEDICATIONS  Home Medications     Reviewed by Poonam Thomas, PharmD (Pharmacist) on 11/12/21 at 2217  Med List Status: Complete   Medication Last Dose Status   amLODIPine (NORVASC) 5 MG Tab 11/12/2021 Active   atenolol (TENORMIN) 25 MG Tab 11/12/2021 Active   cloNIDine (CATAPRES) 0.2 MG/24HR PATCH WEEKLY patch 11/7/2021 Active   hydroxychloroquine (PLAQUENIL) 200 MG Tab 11/12/2021 Active   mycophenolate sodium (MYFORTIC) 360 MG Tablet Delayed Response tablet 11/12/2021 Active   ondansetron (ZOFRAN ODT) 4 MG TABLET DISPERSIBLE PRN Active   pantoprazole (PROTONIX) 40 MG Tablet Delayed Response 11/12/2021 Active   predniSONE (DELTASONE) 5 MG Tab 11/12/2021 Active                ALLERGIES  Allergies   Allergen Reactions   • Cephalexin Rash     Nausea and rash    • Clindamycin Rash     Nausea and rash      • Methylprednisolone Unspecified     Anxious   • Metoprolol Rash     Nausea and rash      • Maxipime [Cefepime] Itching   • Norco [Hydrocodone-Acetaminophen] Rash and Nausea     Generalized rash   • Tape        PHYSICAL EXAM  VITAL SIGNS: /95   Pulse  "(!) 120   Temp 36.4 °C (97.6 °F) (Temporal)   Resp 18   Ht 1.702 m (5' 7\")   Wt 54.1 kg (119 lb 4.3 oz)   LMP 11/05/2021 (Approximate)   SpO2 100%   BMI 18.68 kg/m²   Vitals reviewed by myself.  Physical Exam  Constitutional:       Appearance: Normal appearance.   HENT:      Head: Normocephalic.   Cardiovascular:      Comments: Tachycardic rate, regular rhythm  Pulmonary:      Effort: Pulmonary effort is normal.      Breath sounds: Normal breath sounds.   Abdominal:      General: Abdomen is flat. Bowel sounds are normal.      Palpations: Abdomen is soft.   Musculoskeletal:         General: Normal range of motion.   Skin:     General: Skin is warm and dry.   Neurological:      Mental Status: She is alert.           DIAGNOSTIC STUDIES /  LABS  Labs Reviewed   CBC WITH DIFFERENTIAL - Abnormal; Notable for the following components:       Result Value    WBC 4.4 (*)     RBC 1.86 (*)     Hemoglobin 5.0 (*)     Hematocrit 17.5 (*)     MCH 26.9 (*)     MCHC 28.6 (*)     RDW 62.3 (*)     Platelet Count 156 (*)     Neutrophils-Polys 79.00 (*)     Lymphocytes 11.40 (*)     Lymphs (Absolute) 0.50 (*)     All other components within normal limits   BASIC METABOLIC PANEL - Abnormal; Notable for the following components:    Sodium 134 (*)     Chloride 91 (*)     Co2 35 (*)     Glucose 103 (*)     Bun 26 (*)     Creatinine 3.58 (*)     All other components within normal limits   ESTIMATED GFR - Abnormal; Notable for the following components:    GFR If  19 (*)     GFR If Non  16 (*)     All other components within normal limits   ABO AND RH DETERMINATION   ANTIBODY SCREEN   COMPONENT CELLULAR   PLATELET ESTIMATE   MORPHOLOGY   DIFFERENTIAL COMMENT   IRON/TOTAL IRON BIND   FERRITIN   CBC WITH DIFFERENTIAL   COMP METABOLIC PANEL   MAGNESIUM   RELEASE RED BLOOD CELLS   TRANSFUSE RED BLOOD CELLS-NURSING COMMUNICATION (UNITS)       All labs reviewed by me.      COURSE & MEDICAL DECISION " MAKING  Nursing notes, VS, PMSFHx reviewed in chart.    Patient is a 25-year-old female who comes in for evaluation of abnormal labs.  Differential diagnosis includes end-stage renal disease, acute on chronic anemia, active bleeding.  Patient has no evidence for active bleeding, she has acute on chronic anemia and therefore will be hospitalized for her hemoglobin of 5.9 from earlier today and transfused.  Type and screen and red blood cells are ordered.  I also ordered baseline labs as patient does have chronic kidney disease.  Her potassium is within normal limits.  Discussed the case with on-call nephrologist Dr. Najjarwho will plan to dialyze her tomorrow.  Patient will be hospitalized for ongoing care.  Discussed the case with Dr. Ríos who has accepted patient for hospitalization.  Patient is in guarded condition.      FINAL IMPRESSION  1. Anemia, unspecified type

## 2021-11-13 NOTE — PROGRESS NOTES
Pt arrived to unit. Pt ambulated with assistance to hospital bed, pt has no medical complaints or needs at this time, pt receiving blood transfusion V/S assessed,  no signs of interaction, pt placed on tele monitor will continue to monitor.

## 2021-11-13 NOTE — CARE PLAN
The patient is Stable - Low risk of patient condition declining or worsening    Shift Goals  Clinical Goals: for transfusion and hemodialysis    Progress made toward(s) clinical / shift goals:  Completed 1 unit of transfusion and hemodialysis, no untoward signs and symptoms noted.    Patient is not progressing towards the following goals:      Problem: Pain - Standard  Goal: Alleviation of pain or a reduction in pain to the patient’s comfort goal  Outcome: Progressing     Problem: Gastrointestinal Irritability  Goal: Nausea and vomiting will be absent or improve  Outcome: Progressing     Problem: Hemodynamics  Goal: Patient's hemodynamics, fluid balance and neurologic status will be stable or improve  Outcome: Progressing     Problem: Fluid Volume  Goal: Fluid volume balance will be maintained  Outcome: Progressing

## 2021-11-13 NOTE — ED TRIAGE NOTES
"Chief Complaint   Patient presents with   • Sent by MD mann at the infusion center for lab draw, Hgb is 5   • Low Back Pain     x 1 day     Pt feels lightheaded, dizzy, and tired    /95   Pulse (!) 120   Temp 36.4 °C (97.6 °F) (Temporal)   Resp 18   Ht 1.702 m (5' 7\")   Wt 54.1 kg (119 lb 4.3 oz)   SpO2 100%      Has this patient been vaccinated for COVID yes     "

## 2021-11-13 NOTE — DIETARY
"Nutrition services: Day 0 of admit.  Lily Nicole is a 25 y.o. female with admitting DX of Anemia  Consult received for BMI <19.    Assessment:  Height: 170.2 cm (5' 7\")  Weight: 54.1 kg (119 lb 4.3 oz)  Body mass index is 18.68 kg/m²., BMI classification: Normal  Diet/Intake: Renal    Evaluation:   1. Pt with BMI <19. Pt denies unplanned weight loss. Per chart review, weight has been stable since May. Weight on 5/19/21 = 55.8 kg.  2. Pt states appetite is good. Pt receiving dialysis at time of visit, so had not yet touched meal tray.    Malnutrition Risk: Criteria not met.    Recommendations/Plan:   1. Encourage continued good intake of meals >50%.  2. Document intake of all meals as % taken in ADL's to provide interdisciplinary communication across all shifts.   3. Monitor weight.  4. Nutrition rep will continue to see patient for ongoing meal and snack preferences.   5. RD to monitor weekly per department guidelines.              "

## 2021-11-13 NOTE — ED NOTES
Med Rec completed per patient interview  Allergies reviewed  No ORAL antibiotics in last 14 days  Ok per patient to discuss medications with visitors present

## 2021-11-13 NOTE — DISCHARGE SUMMARY
"Discharge Summary    CHIEF COMPLAINT ON ADMISSION  Chief Complaint   Patient presents with   • Sent by MD     was at the [facility] center for lab draw, Hgb is 5   • Low Back Pain     x 1 day       Reason for Admission  Sent by md; lower back pain      Admission Date  11/12/2021    CODE STATUS  Full Code    HPI & HOSPITAL COURSE  Per notes, \"25 y.o. female with a past medical history of systemic lupus erythematosus, end-stage renal disease on hemodialysis, chronic anemia with history of multiple transfusions, and hypertension who has been sent by her primary care physician on 11/12/2021 for low hemoglobin level of 5.  Patient reports that she has been feeling unwell over the past 2 days.  She reports dizziness and easy fatigability.  She denies having worsening of her shortness of breath.  She denies having fever cough lower extremity pain redness or swelling.  She denies noticing any evidence of bleeding or ecchymosis.\"    Patient was admitted monitor overnight shortness of breath secondary to chronic anemia.  She underwent dialysis on 11/13/2021, without complications.  She did require 2 units of RBCs which were transfused during hospitalization.  Patient had significant improvement overall symptoms.  She has dialysis tablets in the outpatient setting.  She also follows closely with nephrologist and PCP.     Therefore, she is discharged in fair and stable condition to home with close outpatient follow-up.    The patient recovered much more quickly than anticipated on admission.    Discharge Date  11/13/2024    FOLLOW UP ITEMS POST DISCHARGE  Follow-up with PCP  Follow-up with nephrology    DISCHARGE DIAGNOSES  Principal Problem:    Symptomatic anemia requiring blood transfusion POA: Yes  Active Problems:    ESRD (end stage renal disease) on dialysis (HCC) POA: Yes    HTN (hypertension) POA: Yes      Overview: \"Controlled with medication\"    Chronic respiratory failure with hypoxia (HCC) POA: Yes    SLE " "glomerulonephritis syndrome (HCC) POA: Yes      Overview: Formatting of this note might be different from the original.      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  Co- Management with       Pediatric Nephrology, Pediatric Rheumatology.  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 *      FOLLOW UP  Future Appointments   Date Time Provider Department Center   11/26/2021  3:30 PM [facility]   11/28/2021  8:45 AM [facility]   12[facility]10[facility]20[facility]   [facility]12[facility]2021  [facility]     No follow-up provider specified.    MEDICATIONS ON DISCHARGE     Medication List      CONTINUE taking these medications      Instructions   amLODIPine 5 MG Tabs  Commonly known as: NORVASC   Take 5 mg by mouth every day.  Dose: 5 mg     atenolol 25 MG Tabs  Commonly known as: TENORMIN   Take 25 mg by mouth every day.  Dose: 25 mg     cloNIDine 0.2 MG/24HR Ptwk patch  Commonly known as: CATAPRES   Place 1 Patch on the skin every 7 days.  Dose: 1 Patch     hydroxychloroquine 200 MG Tabs  Commonly known as: Plaquenil   Take 200 mg by mouth every evening.  Dose: 200 mg     mycophenolate sodium 360 MG Tbec " tablet  Commonly known as: MYFORTIC   Take 360 mg by mouth every evening.  Dose: 360 mg     ondansetron 4 MG Tbdp  Commonly known as: ZOFRAN ODT   Take 1 tablet by mouth every four hours as needed for Nausea.  Dose: 4 mg     pantoprazole 40 MG Tbec  Commonly known as: PROTONIX   Take 1 tablet by mouth 2 times a day.  Dose: 40 mg     predniSONE 5 MG Tabs  Commonly known as: DELTASONE   Take 5 mg by mouth every day.  Dose: 5 mg            Allergies  Allergies   Allergen Reactions   • Cephalexin Rash     Nausea and rash    • Clindamycin Rash     Nausea and rash      • Methylprednisolone Unspecified     Anxious   • Metoprolol Rash     Nausea and rash      • Maxipime [Cefepime] Itching   • Norco [Hydrocodone-Acetaminophen] Rash and Nausea     Generalized rash   • Tape        DIET  Orders Placed This Encounter   Procedures   • Diet Order Diet: Renal     Standing Status:   Standing     Number of Occurrences:   1     Order Specific Question:   Diet:     Answer:   Renal [8]       ACTIVITY  As tolerated.  Weight bearing as tolerated    CONSULTATIONS  Nephrology    PROCEDURES  Hemodialysis    LABORATORY  Lab Results   Component Value Date    SODIUM 136 11/13/2021    POTASSIUM 4.7 11/13/2021    CHLORIDE 93 (L) 11/13/2021    CO2 34 (H) 11/13/2021    GLUCOSE 87 11/13/2021    BUN 35 (H) 11/13/2021    CREATININE 4.39 (H) 11/13/2021        Lab Results   Component Value Date    WBC 3.2 (L) 11/13/2021    HEMOGLOBIN 6.7 (L) 11/13/2021    HEMATOCRIT 23.0 (L) 11/13/2021    PLATELETCT 115 (L) 11/13/2021        Total time of the discharge process exceeds 35 minutes.

## 2021-11-13 NOTE — PROGRESS NOTES
Telemetry Shift Summary    Rhythm SR  HR Range 60s  Ectopy none  Measurements 0.20/0.10/0.40        Normal Values  Rhythm SR  HR Range    Measurements 0.12-0.20 / 0.06-0.10  / 0.30-0.52

## 2021-11-13 NOTE — H&P
Hospital Medicine History & Physical Note    Date of Service  11/12/2021    Primary Care Physician  Ella Matthew M.D.    Consultants  Citlalli Pepper     Code Status  Full Code    Chief Complaint  Chief Complaint   Patient presents with   • Sent by MD mann at the [facility] for lab draw, Hgb is 5   • Low Back Pain     x 1 day     History of Presenting Illness  Lily Nicole is a 25 y.o. female with a past medical history of systemic lupus erythematosus, end-stage renal disease on hemodialysis, chronic anemia with history of multiple transfusions, and hypertension who has been sent by her primary care physician on 11/12/2021 for low hemoglobin level of 5.  Patient reports that she has been feeling unwell over the past 2 days.  She reports dizziness and easy fatigability.  She denies having worsening of her shortness of breath.  She denies having fever cough lower extremity pain redness or swelling.  She denies noticing any evidence of bleeding or ecchymosis.    I discussed the plan of care with emergency department physician, the patient and patient family who are present at bedside in the emergency room    Review of Systems  Review of Systems   Constitutional: Positive for malaise/fatigue. Negative for chills and fever.   Eyes: Negative for discharge and redness.   Respiratory: Negative for cough, shortness of breath and stridor.    Cardiovascular: Negative for chest pain and leg swelling.   Gastrointestinal: Negative for abdominal pain and vomiting.   Genitourinary: Negative for flank pain.   Musculoskeletal: Negative for myalgias.   Skin: Negative.    Neurological: Positive for dizziness and headaches. Negative for focal weakness.   Endo/Heme/Allergies: Does not bruise/bleed easily.   Psychiatric/Behavioral: The patient is not nervous/anxious.      Past Medical History   has a past medical history of Anemia (01/17/2018), AVF (arteriovenous fistula) ([facility]), Chest pain, Chest tightness, Daytime  sleepiness, Dialysis patient ([facility]), Difficulty breathing, ESRD (end stage renal disease) on dialysis ([facility]) (01/17/2018), Gasping for breath, Heart burn, Hypertension (01/17/2018), Indigestion, Lupus ([facility]), Migraines (01/17/2018), Painful breathing, Palpitations, Seizure ([facility]) (2013), Shortness of breath, Swelling of lower extremity, and Wheezing.    Surgical History   has a past surgical history that includes ronak by laparoscopy (4/5/2010); av fistula creation (Right); angioplasty (01/17/2018); other; other; gastroscopy-endo (12/9/2019); gastroscopy (N/A, 5/30/2020); gastroscopy-endo (9/18/2020); pr upper gi endoscopy,ctrl bleed (11/12/2020); pr upper gi endoscopy,biopsy (11/12/2020); gastroscopy-endo (11/12/2020); pr colonoscopy,diagnostic (1/8/2021); pr upper gi endoscopy,diagnosis (1/8/2021); pr upper gi endoscopy,ctrl bleed (1/8/2021); pr upper gi endoscopy,diagnosis (3/5/2021); pr upper gi endoscopy,diagnosis (N/A, 3/19/2021); pr upper gi endoscopy,ctrl bleed (3/19/2021); and pr bronchoscopy,diagnostic (Bilateral, 5/13/2021).     Family History  family history includes Diabetes in her paternal grandmother.      Social History   reports that she has never smoked. She has never used smokeless tobacco. She reports that she does not drink alcohol and does not use drugs.    Allergies  Allergies   Allergen Reactions   • Cephalexin Rash     Nausea and rash    • Clindamycin Rash     Nausea and rash      • Methylprednisolone Unspecified     Anxious   • Metoprolol Rash     Nausea and rash      • Maxipime [Cefepime] Itching   • Norco [Hydrocodone-Acetaminophen] Rash and Nausea     Generalized rash   • Tape      Medications  Prior to Admission Medications   Prescriptions Last Dose Informant Patient Reported? Taking?   amLODIPine (NORVASC) 5 MG Tab   Yes No   atenolol (TENORMIN) 25 MG Tab   Yes No   Sig: atenolol 25 mg tablet   TAKE 1 TABLET BY MOUTH ONCE DAILY   budesonide-formoterol (SYMBICORT) 160-4.5 MCG/ACT Aerosol    Yes No   Sig: Symbicort 160 mcg-4.5 mcg/actuation HFA aerosol inhaler   Patient not taking: Reported on 10/29/2021   carvedilol (COREG) 25 MG Tab  Patient's Home Pharmacy No No   Sig: Take 1 tablet by mouth 2 times a day with meals.   cloNIDine (CATAPRES) 0.2 MG/24HR PATCH WEEKLY patch  Patient's Home Pharmacy Yes No   Sig: Place 1 Patch on the skin every 7 days.   ferrous gluconate (FERGON) 324 (38 Fe) MG Tab   Yes No   Sig: ferrous gluconate 324 mg (38 mg iron) tablet   TAKE 1 TABLET BY MOUTH THREE TIMES DAILY WITH MEALS   furosemide (LASIX) 80 MG Tab   No No   Sig: Take 3 Tablets by mouth 2 times a day.   hydroxychloroquine (PLAQUENIL) 200 MG Tab  Patient's Home Pharmacy Yes No   Sig: Take 200 mg by mouth every evening.   ketorolac (TORADOL) 10 MG Tab   No No   Sig: Take 1 Tablet by mouth 3 times a day as needed for Moderate Pain. With food   mycophenolate sodium (MYFORTIC) 360 MG Tablet Delayed Response tablet   Yes No   Sig: TAKE 2 TABLETS BY MOUTH ONCE DAILY IN THE MORNING AND 1 ONCE DAILY IN THE EVENING   omeprazole (PRILOSEC) 40 MG delayed-release capsule   Yes No   Sig: omeprazole 40 mg capsule,delayed release   TAKE 1 CAPSULE BY MOUTH ONCE DAILY FOR 18 DAYS   ondansetron (ZOFRAN ODT) 4 MG TABLET DISPERSIBLE   No No   Sig: Take 1 tablet by mouth every four hours as needed for Nausea.   oxyCODONE immediate-release (ROXICODONE) 5 MG Tab   Yes No   Sig: oxycodone 5 mg tablet   TAKE 1 TABLET BY MOUTH EVERY 6 HOURS AS NEEDED FOR 5 DAYS   oxyCODONE-acetaminophen (PERCOCET) 5-325 MG Tab   Yes No   Sig: oxycodone-acetaminophen 5 mg-325 mg tablet   TAKE 1 TABLET BY MOUTH EVERY 4 HOURS AS NEEDED FOR UP TO 3 DAYS   pantoprazole (PROTONIX) 40 MG Tablet Delayed Response   No No   Sig: Take 1 tablet by mouth 2 times a day.   predniSONE (DELTASONE) 5 MG Tab  Family Member Yes No   Sig: Take 5 mg by mouth every day.      Facility-Administered Medications: None     Physical Exam  Temp:  [36.4 °C (97.6 °F)-36.8 °C (98.2 °F)]  36.4 °C (97.6 °F)  Pulse:  [] 94  Resp:  [18] 18  BP: (123-160)/() 160/102  SpO2:  [97 %-100 %] 100 %  Blood Pressure: 160/102   Temperature: 36.4 °C (97.6 °F)   Pulse: 94   Respiration: 18   Pulse Oximetry: 100 %     Physical Exam  Constitutional:       General: She is not in acute distress.  HENT:      Head: Normocephalic and atraumatic.      Right Ear: External ear normal.      Left Ear: External ear normal.      Nose: No congestion or rhinorrhea.      Mouth/Throat:      Mouth: Mucous membranes are dry.      Pharynx: No oropharyngeal exudate or posterior oropharyngeal erythema.   Eyes:      General: No scleral icterus.        Right eye: No discharge.         Left eye: No discharge.      Conjunctiva/sclera: Conjunctivae normal.      Pupils: Pupils are equal, round, and reactive to light.   Cardiovascular:      Heart sounds: No friction rub. No gallop.    Pulmonary:      Effort: Pulmonary effort is normal.   Abdominal:      General: Abdomen is flat. There is no distension.      Tenderness: There is no guarding.   Musculoskeletal:         General: No swelling.      Cervical back: Neck supple. No rigidity. No muscular tenderness.      Right lower leg: No edema.      Left lower leg: No edema.      Comments: Arteriovenous fistula right upper extremity   Skin:     General: Skin is dry.      Capillary Refill: Capillary refill takes 2 to 3 seconds.      Coloration: Skin is pale. Skin is not jaundiced.      Findings: No bruising or erythema.   Neurological:      Mental Status: She is alert and oriented to person, place, and time.   Psychiatric:         Mood and Affect: Mood normal.         Judgment: Judgment normal.       Laboratory:  Recent Labs     11/12/21  1505 11/12/21 2035   WBC 5.1 4.4*   RBC 2.09* 1.86*   HEMOGLOBIN 5.6* 5.0*   HEMATOCRIT 19.4* 17.5*   MCV 92.8 94.1   MCH 26.8* 26.9*   MCHC 28.9* 28.6*   RDW 61.7* 62.3*   PLATELETCT 169 156*   MPV 10.5 10.4     Recent Labs     11/12/21 2035   SODIUM  134*   POTASSIUM 4.2   CHLORIDE 91*   CO2 35*   GLUCOSE 103*   BUN 26*   CREATININE 3.58*   CALCIUM 8.5     Recent Labs     11/12/21 2035   GLUCOSE 103*         No results for input(s): NTPROBNP in the last 72 hours.      No results for input(s): TROPONINT in the last 72 hours.    Imaging:  No orders to display     Assessment/Plan:  I anticipate this patient is appropriate for observation status at this time.    * Symptomatic anemia requiring blood transfusion- (present on admission)  Assessment & Plan  Hemoglobin of 5 from admission.  Transfusing blood.  Continue to monitor, transfuse for hemoglobin of less than or equal to 7   We will check iron studies, consider parenteral iron according to results    SLE glomerulonephritis syndrome (HCC)- (present on admission)  Assessment & Plan  Resume home immunosuppressive medications    Chronic respiratory failure with hypoxia (HCC)- (present on admission)  Assessment & Plan  Oxygen as needed    HTN (hypertension)- (present on admission)  Assessment & Plan  Resume home amlodipine    ESRD (end stage renal disease) on dialysis (HCC)- (present on admission)  Assessment & Plan  Follows with Dr. Pabon, nephrology on-call, Dr Lennon, consulted plan to resume dialysis    VTE prophylaxis: SCDs/TEDs

## 2021-11-13 NOTE — PROGRESS NOTES
[facility] Progress Note      HD ordered by Dr. Najjar. Treatment started at 1031 and ended at 1331.     Total Net UF 3500mL.    Pt tolerated treatment well with no issues. 1 unit of PRBC's transfused with HD.  See paper flow sheet for details. Cannulation needles taken out, held pressure for 10 min and placed gauze dressing with no bleeding. RUE AVF + for bruit/thrill. Report given to CHANCE Bowen RN.

## 2021-11-14 ENCOUNTER — APPOINTMENT (OUTPATIENT)
Dept: ONCOLOGY | Facility: MEDICAL CENTER | Age: 26
End: 2021-11-14
Attending: INTERNAL MEDICINE
Payer: COMMERCIAL

## 2021-11-14 NOTE — PROGRESS NOTES
Discharging patient home per MD order. Pt demonstrated understanding of discharge instructions, follow up appointments, home medications, prescriptions, dialysis. Pt is voiding without difficulty, pain well controlled, tolerating oral medications, O2 greater than 90%.  Pt verbalized understanding of discharge instructions and educational handouts and all questions answered. IVF removed. Pt discharged off unit with hospital escort.

## 2021-11-14 NOTE — DISCHARGE INSTRUCTIONS
Discharge Instructions    Discharged to home by car with relative. Discharged via wheelchair, hospital escort: Yes.  Special equipment needed: Not Applicable    Be sure to schedule a follow-up appointment with your primary care doctor or any specialists as instructed.     Discharge Plan:   Diet Plan: Discussed  Activity Level: Discussed  Confirmed Follow up Appointment: Patient to Call and Schedule Appointment  Confirmed Symptoms Management: Discussed  Medication Reconciliation Updated: No (Comments)  Influenza Vaccine Indication: Not indicated: Previously immunized this influenza season and > 8 years of age    I understand that a diet low in cholesterol, fat, and sodium is recommended for good health. Unless I have been given specific instructions below for another diet, I accept this instruction as my diet prescription.   Other diet: Renal    Special Instructions: None    · Is patient discharged on Warfarin / Coumadin?   No     Depression / Suicide Risk    As you are [facility], it is important to learn how to keep safe from harming yourself.    Recognize the warning signs:  · Abrupt changes in personality, positive or negative- including increase in energy   · Giving away possessions  · Change in eating patterns- significant weight changes-  positive or negative  · Change in sleeping patterns- unable to sleep or sleeping all the time   · Unwillingness or inability to communicate  · Depression  · Unusual sadness, discouragement and loneliness  · Talk of wanting to die  · Neglect of personal appearance   · Rebelliousness- reckless behavior  · Withdrawal from people/activities they love  · Confusion- inability to concentrate     If you or a loved one observes any of these behaviors or has concerns about self-harm, here's what you can do:  · Talk about it- your feelings and reasons for harming yourself  · Remove any means that you might use to hurt yourself (examples: pills, rope,  extension cords, firearm)  · Get professional help from the community (Mental Health, Substance Abuse, psychological counseling)  · Do not be alone:Call your Safe Contact- someone whom you trust who will be there for you.  · Call your local CRISIS HOTLINE 242-0870 or 860-845-1452  · Call your local Children's Mobile Crisis Response Team [place] (181) 854-8946 or www.[employer]  · Call the toll free National Suicide Prevention Hotlines   · National Suicide Prevention Lifeline 010-302-CNIF (8477)  · [employer] Line Network 800-SUICIDE (802-9495)      Anemia    Anemia is a condition in which you do not have enough red blood cells or hemoglobin. Hemoglobin is a substance in red blood cells that carries oxygen. When you do not have enough red blood cells or hemoglobin (are anemic), your body cannot get enough oxygen and your organs may not work properly. As a result, you may feel very tired or have other problems.  What are the causes?  Common causes of anemia include:  · Excessive bleeding. Anemia can be caused by excessive bleeding inside or outside the body, including bleeding from the intestine or from periods in women.  · Poor nutrition.  · Long-lasting (chronic) kidney, thyroid, and liver disease.  · Bone marrow disorders.  · Cancer and treatments for cancer.  · HIV (human immunodeficiency virus) and AIDS (acquired immunodeficiency syndrome).  · Treatments for HIV and AIDS.  · Spleen problems.  · Blood disorders.  · Infections, medicines, and autoimmune disorders that destroy red blood cells.  What are the signs or symptoms?  Symptoms of this condition include:  · Minor weakness.  · Dizziness.  · Headache.  · Feeling heartbeats that are irregular or faster than normal (palpitations).  · Shortness of breath, especially with exercise.  · Paleness.  · Cold sensitivity.  · Indigestion.  · Nausea.  · Difficulty sleeping.  · Difficulty concentrating.  Symptoms may occur suddenly or develop slowly. If your  anemia is mild, you may not have symptoms.  How is this diagnosed?  This condition is diagnosed based on:  · Blood tests.  · Your medical history.  · A physical exam.  · Bone marrow biopsy.  Your health care provider may also check your stool (feces) for blood and may do additional testing to look for the cause of your bleeding.  You may also have other tests, including:  · Imaging tests, such as a CT scan or MRI.  · Endoscopy.  · Colonoscopy.  How is this treated?  Treatment for this condition depends on the cause. If you continue to lose a lot of blood, you may need to be treated at a hospital. Treatment may include:  · Taking supplements of iron, vitamin B12, or folic acid.  · Taking a hormone medicine (erythropoietin) that can help to stimulate red blood cell growth.  · Having a blood transfusion. This may be needed if you lose a lot of blood.  · Making changes to your diet.  · Having surgery to remove your spleen.  Follow these instructions at home:  · Take over-the-counter and prescription medicines only as told by your health care provider.  · Take supplements only as told by your health care provider.  · Follow any diet instructions that you were given.  · Keep all follow-up visits as told by your health care provider. This is important.  Contact a health care provider if:  · You develop new bleeding anywhere in the body.  Get help right away if:  · You are very weak.  · You are short of breath.  · You have pain in your abdomen or chest.  · You are dizzy or feel faint.  · You have trouble concentrating.  · You have bloody or black, tarry stools.  · You vomit repeatedly or you vomit up blood.  Summary  · Anemia is a condition in which you do not have enough red blood cells or enough of a substance in your red blood cells that carries oxygen (hemoglobin).  · Symptoms may occur suddenly or develop slowly.  · If your anemia is mild, you may not have symptoms.  · This condition is diagnosed with blood tests as  well as a medical history and physical exam. Other tests may be needed.  · Treatment for this condition depends on the cause of the anemia.  This information is not intended to replace advice given to you by your health care provider. Make sure you discuss any questions you have with your health care provider.  Document Released: 01/25/2006 Document Revised: 11/30/2018 Document Reviewed: 01/19/2018  Elsevier Patient Education © 2020 [employer]    Hemodialysis, Care After  This sheet gives you information about how to care for yourself after your procedure. Your health care provider may also give you more specific instructions. If you have problems or questions, contact your health care provider.  What can I expect after the procedure?  After the procedure, it is common to have:  · Fatigue. Energy levels usually return to normal the day after the procedure.  · Itchiness. Your health care provider may be able to prescribe medicine to relieve this.  · Achy or jittery legs. You may feel like kicking your legs. This sometimes causes sleeping problems.  Follow these instructions at home:  Eating and drinking    · Talk with your health care provider about how much fluid you can drink. Keep track of your fluid intake to make sure you do not drink more fluid than is allowed. This is important because fluid can build up in your body between dialysis sessions. Built-up fluid affects your blood pressure and can make your heart work harder.  · Limit your salt (sodium) intake. This is important because:  ? Sodium makes you thirsty, which creates a problem since the amount of fluid you can drink every day is limited.  ? Sodium affects your blood pressure levels. The more you control the amount of sodium in your diet, the more control you may have over your blood pressure levels.  · Limit your intake of the mineral potassium. Potassium levels can rise between dialysis sessions. If they become too high, they can cause a dangerous  heart rhythm and even death.  · Limit your intake of the mineral phosphorus. Consuming too much phosphorus can cause your bones to lose calcium. This makes them weaker and more likely to break. Consuming too much phosphorus may also make your skin itch.  · Eat high-quality proteins that are low in phosphorus. High-quality proteins include those found in fish, poultry, and eggs.  Vascular access care  · Avoid sleeping on the site where blood is removed from your body and returned to your body (vascular access).  · If your vascular access is on your arm:  ? Do not lift weights or heavy objects with that arm.  ? Do not wear tight clothing over that arm.  ? Do not have your blood pressure measured on that arm.  ? Do not allow needle punctures (such as for taking blood) on that arm.  · If you have a catheter, do not open it between dialysis sessions.  · If you have a fistula or graft:  ? Wash it with soap every day and before each dialysis session.  ? Feel for a vibration over the access site (thrill) every day. A thrill means the access is working.  General instructions    · Take over-the-counter and prescription medicines only as told by your health care provider. This includes vitamin and mineral supplements. Talk with your health care provider before taking new supplements.  · Carry a wallet card, bracelet, or medical identification tag that shows you are a dialysis patient. Always keep it with you. If you are in an accident or have a medical emergency and cannot communicate, this item will inform health care providers about your condition.  · Keep all dialysis visits as told by your health care provider. Dialysis appointments should be scheduled regularly. Do not skip an appointment.  Contact a health care provider if:  · You have a fever.  · You have chills.  · Your vascular access feels warm.  · You find a pimple on your vascular access.  · You have any of the following around the vascular access  site:  ? Swelling.  ? Redness.  ? Bleeding or drainage.  Get help right away if:  · You have a fistula, and pain, numbness, or unusual paleness develops in the hand on the side of your fistula.  · You have a fistula or graft and you do not feel a thrill.  · You develop dizziness or weakness that you have not had before.  · You develop shortness of breath.  · You develop chest pain.  · There is pus at the vascular access site.  · There is bleeding at the vascular access site that cannot be easily controlled.  · You become disoriented or confused.  · You develop blurred vision.  · You have jerky movements you cannot control (seizure).  Summary  · After hemodialysis, it is common to have fatigue, itchiness, and achy or jittery legs.  · Keep close track of your fluid intake, limit intake of sodium, potassium, and phosphorous, eat high-quality proteins, and take vitamin and mineral supplements as directed by your health care provider.  · Take proper care of your vascular access, and contact your health care provider if you have problems with your vascular access, such as warmth, swelling, redness, drainage, or bleeding at the site.  This information is not intended to replace advice given to you by your health care provider. Make sure you discuss any questions you have with your health care provider.  Document Released: 04/14/2014 Document Revised: 03/15/2019 Document Reviewed: 11/30/2017  [employer] Patient Education © 2020 [employer]

## 2021-11-14 NOTE — CONSULTS
DATE OF SERVICE:  11/13/2021     REQUESTING PHYSICIAN:  Dr. Anne Horton from the emergency room service.     REASON FOR CONSULTATION:  Management of chronic kidney disease, assessing the   need for urgent dialysis.     The patient seen and examined, medical record reviewed.     HISTORY OF PRESENT ILLNESS:  The patient is a 25-year-old lady who is well   known to our service.  She has a history of end-stage renal disease, undergoes   hemodialysis on Monday, Wednesday, Friday schedule at [facility].    She is under the care of Dr. Trent.  She has a history of chronic anemia   requiring frequent blood transfusion; patient presented to the hospital last   night with generalized weakness and fatigue and also found on outside labs to   have severe anemia with hemoglobin at 5.  The patient is scheduled to have   blood transfusion.  We were called to manage her kidney disease, assessing the   need for dialysis post-transfusion to limit volume overload.     The patient has no fever, no chills, no coughing, no hemoptysis.     PAST MEDICAL HISTORY:  Significant for:  1.  End-stage renal disease.  2.  Anemia.  3.  Lupus.     ALLERGIES:  The list was reviewed.     SOCIAL HISTORY:  The patient has no smoking history.     FAMILY HISTORY:  Positive for diabetes in her grandmother.     MEDICATIONS:  Reviewed.     REVIEW OF SYSTEMS:  Patient feels fatigued.  She has occasional dizziness and   headache.  All other review of systems negative except outlined in the history   of present illness.     PHYSICAL EXAMINATION:    GENERAL:  The patient appears ill, but no apparent distress.  VITAL SIGNS:  Showed blood pressure of 160/100, heart rate was 94, respiratory   rate was 18.  HEENT:  Normocephalic, atraumatic.  Sclerae are anicteric.  Pupils are   reactive.  Nose normal.  Mucous membranes moist.  NECK:  No lymphadenopathy, no JVD, no thyroid mass.  CHEST:  Normal.  LUNGS:  Clear to auscultation.  HEART:  S1, S2.  ABDOMEN:   Soft, nontender.  No hepatosplenomegaly.  There is no inguinal   lymphadenopathy.  EXTREMITIES:  There is trace lower extremity edema.  SKIN:  The patient has pale.  The patient had right upper arm AV fistula with   good thrill.     LABORATORY DATA:  Her recent labs from today were reviewed.     ASSESSMENT:    1.  End-stage renal disease.  2.  Uncontrolled hypertension.  3.  Severe anemia.  4.  Leukopenia.  5.  Thrombocytopenia.     PLAN:  1.  We will plan urgent dialysis post-transfusion to prevent volume overload.  2.  To evaluate daily for the need of dialysis.  3.  Epogen with dialysis.  4.  Renal diet.  5.  Renal dose all medications.  6.  Prognosis guarded.     Plan discussed in detail with Dr. Horton.        ______________________________  FADI NAJJAR, MD FN/KAMRYN    DD:  11/13/2021 14:45  DT:  11/13/2021 18:38    Job#:  [id number]

## 2021-11-14 NOTE — PROGRESS NOTES
Telemetry Shift Summary    Rhythm SR/ST  HR Range   Ectopy none  Measurements 0.18/0.08/0.34        Normal Values  Rhythm SR  HR Range    Measurements 0.12-0.20 / 0.06-0.10  / 0.30-0.52

## 2021-11-26 ENCOUNTER — OUTPATIENT INFUSION SERVICES (OUTPATIENT)
Dept: ONCOLOGY | Facility: MEDICAL CENTER | Age: 26
End: 2021-11-26
Attending: INTERNAL MEDICINE
Payer: COMMERCIAL

## 2021-11-26 VITALS
OXYGEN SATURATION: 100 % | HEART RATE: 110 BPM | TEMPERATURE: 97.3 F | SYSTOLIC BLOOD PRESSURE: 115 MMHG | RESPIRATION RATE: 18 BRPM | DIASTOLIC BLOOD PRESSURE: 72 MMHG

## 2021-11-26 DIAGNOSIS — D64.9 SYMPTOMATIC ANEMIA: ICD-10-CM

## 2021-11-26 LAB
ABO GROUP BLD: ABNORMAL
BASOPHILS # BLD AUTO: 0.6 % (ref 0–1.8)
BASOPHILS # BLD: 0.02 K/UL (ref 0–0.12)
BLD GP AB SCN SERPL QL: ABNORMAL
DAT C3D-SP REAG RBC QL: ABNORMAL
DAT IGG-SP REAG RBC QL: ABNORMAL
EOSINOPHIL # BLD AUTO: 0.03 K/UL (ref 0–0.51)
EOSINOPHIL NFR BLD: 0.9 % (ref 0–6.9)
ERYTHROCYTE [DISTWIDTH] IN BLOOD BY AUTOMATED COUNT: 56.3 FL (ref 35.9–50)
HCT VFR BLD AUTO: 26.1 % (ref 37–47)
HGB BLD-MCNC: 7.9 G/DL (ref 12–16)
IMM GRANULOCYTES # BLD AUTO: 0.02 K/UL (ref 0–0.11)
IMM GRANULOCYTES NFR BLD AUTO: 0.6 % (ref 0–0.9)
LYMPHOCYTES # BLD AUTO: 0.35 K/UL (ref 1–4.8)
LYMPHOCYTES NFR BLD: 10.2 % (ref 22–41)
MCH RBC QN AUTO: 26.3 PG (ref 27–33)
MCHC RBC AUTO-ENTMCNC: 30.3 G/DL (ref 33.6–35)
MCV RBC AUTO: 87 FL (ref 81.4–97.8)
MONOCYTES # BLD AUTO: 0.23 K/UL (ref 0–0.85)
MONOCYTES NFR BLD AUTO: 6.7 % (ref 0–13.4)
NEUTROPHILS # BLD AUTO: 2.78 K/UL (ref 2–7.15)
NEUTROPHILS NFR BLD: 81 % (ref 44–72)
NRBC # BLD AUTO: 0 K/UL
NRBC BLD-RTO: 0 /100 WBC
PLATELET # BLD AUTO: 68 K/UL (ref 164–446)
PMV BLD AUTO: 11.8 FL (ref 9–12.9)
RBC # BLD AUTO: 3 M/UL (ref 4.2–5.4)
RH BLD: ABNORMAL
WBC # BLD AUTO: 3.4 K/UL (ref 4.8–10.8)

## 2021-11-26 PROCEDURE — 86870 RBC ANTIBODY IDENTIFICATION: CPT | Mod: 91

## 2021-11-26 PROCEDURE — 86850 RBC ANTIBODY SCREEN: CPT

## 2021-11-26 PROCEDURE — 86901 BLOOD TYPING SEROLOGIC RH(D): CPT

## 2021-11-26 PROCEDURE — 86860 RBC ANTIBODY ELUTION: CPT

## 2021-11-26 PROCEDURE — 86900 BLOOD TYPING SEROLOGIC ABO: CPT

## 2021-11-26 PROCEDURE — 36415 COLL VENOUS BLD VENIPUNCTURE: CPT

## 2021-11-26 PROCEDURE — 86880 COOMBS TEST DIRECT: CPT | Mod: 91

## 2021-11-26 PROCEDURE — 85025 COMPLETE CBC W/AUTO DIFF WBC: CPT

## 2021-11-26 RX ORDER — 0.9 % SODIUM CHLORIDE 0.9 %
10 VIAL (ML) INJECTION PRN
Status: CANCELLED | OUTPATIENT
Start: 2021-11-26

## 2021-11-26 RX ORDER — 0.9 % SODIUM CHLORIDE 0.9 %
VIAL (ML) INJECTION PRN
Status: CANCELLED | OUTPATIENT
Start: 2021-11-26

## 2021-11-26 RX ORDER — 0.9 % SODIUM CHLORIDE 0.9 %
3 VIAL (ML) INJECTION PRN
Status: CANCELLED | OUTPATIENT
Start: 2021-11-26

## 2021-11-26 RX ORDER — DIPHENHYDRAMINE HCL 25 MG
25 TABLET ORAL ONCE
Status: CANCELLED | OUTPATIENT
Start: 2021-11-26 | End: 2021-11-26

## 2021-11-26 RX ORDER — ACETAMINOPHEN 325 MG/1
650 TABLET ORAL ONCE
Status: CANCELLED | OUTPATIENT
Start: 2021-11-26

## 2021-11-26 RX ORDER — DIPHENHYDRAMINE HYDROCHLORIDE 50 MG/ML
25 INJECTION INTRAMUSCULAR; INTRAVENOUS PRN
Status: CANCELLED | OUTPATIENT
Start: 2021-11-26

## 2021-11-26 RX ORDER — ACETAMINOPHEN 325 MG/1
650 TABLET ORAL PRN
Status: CANCELLED | OUTPATIENT
Start: 2021-11-26

## 2021-11-26 RX ORDER — SODIUM CHLORIDE 9 MG/ML
INJECTION, SOLUTION INTRAVENOUS CONTINUOUS
Status: CANCELLED | OUTPATIENT
Start: 2021-11-26

## 2021-11-26 RX ORDER — HEPARIN SODIUM (PORCINE) LOCK FLUSH IV SOLN 100 UNIT/ML 100 UNIT/ML
500 SOLUTION INTRAVENOUS PRN
Status: CANCELLED | OUTPATIENT
Start: 2021-11-26

## 2021-11-27 ENCOUNTER — TELEPHONE (OUTPATIENT)
Dept: ONCOLOGY | Facility: MEDICAL CENTER | Age: 26
End: 2021-11-27

## 2021-11-27 NOTE — TELEPHONE ENCOUNTER
Received call from blood bank that patient COD was positive for new antibodies and reference labs will need to be drawn and sent to [facility].  They will be unable to have PRBC available for tomorrow's appointment.    Per Brad in BB, they will need COD plus 4 lavendar and 1 red top and recommended patient be scheduled for transfusion on Wednesday.    Patient contacted and notified that she would not receive transfusion tomorrow but to come to scheduled appointment for additional lab draw.  Patient verbalized understanding.

## 2021-11-27 NOTE — PROGRESS NOTES
Patient arrived to IS for CBC/COD.  Patient reports she was discharged from [facility] yesterday for fluid overload.  Labs drawn per order.  Patient tolerated well.  Confirmed appointment for Sunday and patient left clinic in no apparent distress.    Hgb 7.9.  COD and BB comm sent for 1 unit PRBC on Sunday.  Patient notified of lab result and confirmed appointment time for Sunday.

## 2021-11-28 ENCOUNTER — APPOINTMENT (OUTPATIENT)
Dept: RADIOLOGY | Facility: MEDICAL CENTER | Age: 26
DRG: 189 | End: 2021-11-28
Attending: EMERGENCY MEDICINE
Payer: COMMERCIAL

## 2021-11-28 ENCOUNTER — APPOINTMENT (OUTPATIENT)
Dept: CARDIOLOGY | Facility: MEDICAL CENTER | Age: 26
DRG: 189 | End: 2021-11-28
Attending: STUDENT IN AN ORGANIZED HEALTH CARE EDUCATION/TRAINING PROGRAM
Payer: COMMERCIAL

## 2021-11-28 ENCOUNTER — APPOINTMENT (OUTPATIENT)
Dept: ONCOLOGY | Facility: MEDICAL CENTER | Age: 26
End: 2021-11-28
Attending: INTERNAL MEDICINE
Payer: COMMERCIAL

## 2021-11-28 ENCOUNTER — APPOINTMENT (OUTPATIENT)
Dept: RADIOLOGY | Facility: MEDICAL CENTER | Age: 26
DRG: 189 | End: 2021-11-28
Attending: STUDENT IN AN ORGANIZED HEALTH CARE EDUCATION/TRAINING PROGRAM
Payer: COMMERCIAL

## 2021-11-28 ENCOUNTER — HOSPITAL ENCOUNTER (INPATIENT)
Facility: MEDICAL CENTER | Age: 26
LOS: 1 days | DRG: 189 | End: 2021-11-28
Attending: EMERGENCY MEDICINE | Admitting: STUDENT IN AN ORGANIZED HEALTH CARE EDUCATION/TRAINING PROGRAM
Payer: COMMERCIAL

## 2021-11-28 VITALS
SYSTOLIC BLOOD PRESSURE: 111 MMHG | TEMPERATURE: 97.7 F | BODY MASS INDEX: 18.41 KG/M2 | HEIGHT: 67 IN | OXYGEN SATURATION: 100 % | HEART RATE: 112 BPM | WEIGHT: 117.28 LBS | RESPIRATION RATE: 18 BRPM | DIASTOLIC BLOOD PRESSURE: 74 MMHG

## 2021-11-28 DIAGNOSIS — R07.9 CHEST PAIN, UNSPECIFIED TYPE: ICD-10-CM

## 2021-11-28 DIAGNOSIS — G89.4 CHRONIC PAIN SYNDROME: ICD-10-CM

## 2021-11-28 LAB
ABO GROUP BLD: ABNORMAL
ALBUMIN SERPL BCP-MCNC: 2.9 G/DL (ref 3.2–4.9)
ALBUMIN/GLOB SERPL: 0.6 G/DL
ALP SERPL-CCNC: 100 U/L (ref 30–99)
ALT SERPL-CCNC: 30 U/L (ref 2–50)
AMPHET UR QL SCN: NEGATIVE
ANION GAP SERPL CALC-SCNC: 14 MMOL/L (ref 7–16)
APTT PPP: 33.6 SEC (ref 24.7–36)
AST SERPL-CCNC: 24 U/L (ref 12–45)
BARBITURATES UR QL SCN: NEGATIVE
BARCODED ABORH UBTYP: 5100
BARCODED ABORH UBTYP: 5100
BARCODED PRD CODE UBPRD: ABNORMAL
BARCODED PRD CODE UBPRD: ABNORMAL
BARCODED UNIT NUM UBUNT: ABNORMAL
BARCODED UNIT NUM UBUNT: ABNORMAL
BASOPHILS # BLD AUTO: 0.6 % (ref 0–1.8)
BASOPHILS # BLD: 0.02 K/UL (ref 0–0.12)
BENZODIAZ UR QL SCN: NEGATIVE
BILIRUB SERPL-MCNC: 0.4 MG/DL (ref 0.1–1.5)
BLD GP AB SCN SERPL QL: ABNORMAL
BUN SERPL-MCNC: 48 MG/DL (ref 8–22)
BZE UR QL SCN: NEGATIVE
CALCIUM SERPL-MCNC: 8.9 MG/DL (ref 8.4–10.2)
CANNABINOIDS UR QL SCN: NEGATIVE
CHLORIDE SERPL-SCNC: 89 MMOL/L (ref 96–112)
CO2 SERPL-SCNC: 30 MMOL/L (ref 20–33)
COMPONENT R 8504R: ABNORMAL
COMPONENT R 8504R: ABNORMAL
CREAT SERPL-MCNC: 5.78 MG/DL (ref 0.5–1.4)
EKG IMPRESSION: NORMAL
EOSINOPHIL # BLD AUTO: 0.07 K/UL (ref 0–0.51)
EOSINOPHIL NFR BLD: 2.2 % (ref 0–6.9)
ERYTHROCYTE [DISTWIDTH] IN BLOOD BY AUTOMATED COUNT: 57.2 FL (ref 35.9–50)
GLOBULIN SER CALC-MCNC: 5.2 G/DL (ref 1.9–3.5)
GLUCOSE SERPL-MCNC: 89 MG/DL (ref 65–99)
HCT VFR BLD AUTO: 23.3 % (ref 37–47)
HGB BLD-MCNC: 7 G/DL (ref 12–16)
IMM GRANULOCYTES # BLD AUTO: 0.01 K/UL (ref 0–0.11)
IMM GRANULOCYTES NFR BLD AUTO: 0.3 % (ref 0–0.9)
INR PPP: 1.19 (ref 0.87–1.13)
LACTATE BLD-SCNC: 1.4 MMOL/L (ref 0.5–2)
LIPASE SERPL-CCNC: 28 U/L (ref 7–58)
LV EJECT FRACT  99904: 55
LYMPHOCYTES # BLD AUTO: 0.43 K/UL (ref 1–4.8)
LYMPHOCYTES NFR BLD: 13.2 % (ref 22–41)
MAGNESIUM SERPL-MCNC: 1.5 MG/DL (ref 1.5–2.5)
MCH RBC QN AUTO: 26.5 PG (ref 27–33)
MCHC RBC AUTO-ENTMCNC: 30 G/DL (ref 33.6–35)
MCV RBC AUTO: 88.3 FL (ref 81.4–97.8)
METHADONE UR QL SCN: NEGATIVE
MONOCYTES # BLD AUTO: 0.18 K/UL (ref 0–0.85)
MONOCYTES NFR BLD AUTO: 5.5 % (ref 0–13.4)
NEUTROPHILS # BLD AUTO: 2.54 K/UL (ref 2–7.15)
NEUTROPHILS NFR BLD: 78.2 % (ref 44–72)
NRBC # BLD AUTO: 0 K/UL
NRBC BLD-RTO: 0 /100 WBC
NT-PROBNP SERPL IA-MCNC: ABNORMAL PG/ML (ref 0–125)
OPIATES UR QL SCN: NEGATIVE
OXYCODONE UR QL SCN: NEGATIVE
PCP UR QL SCN: NEGATIVE
PLATELET # BLD AUTO: 116 K/UL (ref 164–446)
PMV BLD AUTO: 12.2 FL (ref 9–12.9)
POTASSIUM SERPL-SCNC: 4.5 MMOL/L (ref 3.6–5.5)
PRODUCT TYPE UPROD: ABNORMAL
PRODUCT TYPE UPROD: ABNORMAL
PROPOXYPH UR QL SCN: NEGATIVE
PROT SERPL-MCNC: 8.1 G/DL (ref 6–8.2)
PROTHROMBIN TIME: 14.2 SEC (ref 12–14.6)
RBC # BLD AUTO: 2.64 M/UL (ref 4.2–5.4)
RH BLD: ABNORMAL
SODIUM SERPL-SCNC: 133 MMOL/L (ref 135–145)
TROPONIN T SERPL-MCNC: 654 NG/L (ref 6–19)
TROPONIN T SERPL-MCNC: 672 NG/L (ref 6–19)
TROPONIN T SERPL-MCNC: 678 NG/L (ref 6–19)
UNIT STATUS USTAT: ABNORMAL
UNIT STATUS USTAT: ABNORMAL
WBC # BLD AUTO: 3.3 K/UL (ref 4.8–10.8)

## 2021-11-28 PROCEDURE — 93005 ELECTROCARDIOGRAM TRACING: CPT | Performed by: EMERGENCY MEDICINE

## 2021-11-28 PROCEDURE — 93308 TTE F-UP OR LMTD: CPT | Mod: 26 | Performed by: INTERNAL MEDICINE

## 2021-11-28 PROCEDURE — 86850 RBC ANTIBODY SCREEN: CPT

## 2021-11-28 PROCEDURE — 86922 COMPATIBILITY TEST ANTIGLOB: CPT

## 2021-11-28 PROCEDURE — 83690 ASSAY OF LIPASE: CPT

## 2021-11-28 PROCEDURE — 93010 ELECTROCARDIOGRAM REPORT: CPT | Performed by: INTERNAL MEDICINE

## 2021-11-28 PROCEDURE — 96374 THER/PROPH/DIAG INJ IV PUSH: CPT

## 2021-11-28 PROCEDURE — 83880 ASSAY OF NATRIURETIC PEPTIDE: CPT

## 2021-11-28 PROCEDURE — 93005 ELECTROCARDIOGRAM TRACING: CPT | Performed by: STUDENT IN AN ORGANIZED HEALTH CARE EDUCATION/TRAINING PROGRAM

## 2021-11-28 PROCEDURE — 770020 HCHG ROOM/CARE - TELE (206)

## 2021-11-28 PROCEDURE — 93308 TTE F-UP OR LMTD: CPT

## 2021-11-28 PROCEDURE — A9270 NON-COVERED ITEM OR SERVICE: HCPCS | Performed by: EMERGENCY MEDICINE

## 2021-11-28 PROCEDURE — 99223 1ST HOSP IP/OBS HIGH 75: CPT | Mod: AI | Performed by: STUDENT IN AN ORGANIZED HEALTH CARE EDUCATION/TRAINING PROGRAM

## 2021-11-28 PROCEDURE — 85730 THROMBOPLASTIN TIME PARTIAL: CPT

## 2021-11-28 PROCEDURE — 99255 IP/OBS CONSLTJ NEW/EST HI 80: CPT | Performed by: INTERNAL MEDICINE

## 2021-11-28 PROCEDURE — 700111 HCHG RX REV CODE 636 W/ 250 OVERRIDE (IP): Performed by: STUDENT IN AN ORGANIZED HEALTH CARE EDUCATION/TRAINING PROGRAM

## 2021-11-28 PROCEDURE — 86901 BLOOD TYPING SEROLOGIC RH(D): CPT

## 2021-11-28 PROCEDURE — 700102 HCHG RX REV CODE 250 W/ 637 OVERRIDE(OP): Performed by: STUDENT IN AN ORGANIZED HEALTH CARE EDUCATION/TRAINING PROGRAM

## 2021-11-28 PROCEDURE — 85025 COMPLETE CBC W/AUTO DIFF WBC: CPT

## 2021-11-28 PROCEDURE — 83735 ASSAY OF MAGNESIUM: CPT

## 2021-11-28 PROCEDURE — A9270 NON-COVERED ITEM OR SERVICE: HCPCS | Performed by: STUDENT IN AN ORGANIZED HEALTH CARE EDUCATION/TRAINING PROGRAM

## 2021-11-28 PROCEDURE — 85610 PROTHROMBIN TIME: CPT

## 2021-11-28 PROCEDURE — 90935 HEMODIALYSIS ONE EVALUATION: CPT

## 2021-11-28 PROCEDURE — 86870 RBC ANTIBODY IDENTIFICATION: CPT

## 2021-11-28 PROCEDURE — 700102 HCHG RX REV CODE 250 W/ 637 OVERRIDE(OP): Performed by: EMERGENCY MEDICINE

## 2021-11-28 PROCEDURE — 71045 X-RAY EXAM CHEST 1 VIEW: CPT

## 2021-11-28 PROCEDURE — 80053 COMPREHEN METABOLIC PANEL: CPT

## 2021-11-28 PROCEDURE — 80307 DRUG TEST PRSMV CHEM ANLYZR: CPT

## 2021-11-28 PROCEDURE — 84484 ASSAY OF TROPONIN QUANT: CPT

## 2021-11-28 PROCEDURE — 93970 EXTREMITY STUDY: CPT

## 2021-11-28 PROCEDURE — 700105 HCHG RX REV CODE 258: Performed by: EMERGENCY MEDICINE

## 2021-11-28 PROCEDURE — 700111 HCHG RX REV CODE 636 W/ 250 OVERRIDE (IP): Performed by: EMERGENCY MEDICINE

## 2021-11-28 PROCEDURE — 5A1D70Z PERFORMANCE OF URINARY FILTRATION, INTERMITTENT, LESS THAN 6 HOURS PER DAY: ICD-10-PCS | Performed by: INTERNAL MEDICINE

## 2021-11-28 PROCEDURE — 36415 COLL VENOUS BLD VENIPUNCTURE: CPT

## 2021-11-28 PROCEDURE — 83605 ASSAY OF LACTIC ACID: CPT

## 2021-11-28 PROCEDURE — 99285 EMERGENCY DEPT VISIT HI MDM: CPT

## 2021-11-28 RX ORDER — AMLODIPINE BESYLATE 5 MG/1
5 TABLET ORAL DAILY
Status: DISCONTINUED | OUTPATIENT
Start: 2021-11-28 | End: 2021-11-29 | Stop reason: HOSPADM

## 2021-11-28 RX ORDER — DIPHENHYDRAMINE HYDROCHLORIDE 50 MG/ML
25 INJECTION INTRAMUSCULAR; INTRAVENOUS
Status: COMPLETED | OUTPATIENT
Start: 2021-11-28 | End: 2021-11-28

## 2021-11-28 RX ORDER — ACETAMINOPHEN 500 MG
1000 TABLET ORAL EVERY 6 HOURS PRN
COMMUNITY
End: 2022-03-06

## 2021-11-28 RX ORDER — CLONIDINE 0.2 MG/24H
1 PATCH, EXTENDED RELEASE TRANSDERMAL
Status: DISCONTINUED | OUTPATIENT
Start: 2021-11-28 | End: 2021-11-28

## 2021-11-28 RX ORDER — LABETALOL HYDROCHLORIDE 5 MG/ML
10 INJECTION, SOLUTION INTRAVENOUS EVERY 4 HOURS PRN
Status: DISCONTINUED | OUTPATIENT
Start: 2021-11-28 | End: 2021-11-29 | Stop reason: HOSPADM

## 2021-11-28 RX ORDER — ATENOLOL 25 MG/1
25 TABLET ORAL DAILY
Status: DISCONTINUED | OUTPATIENT
Start: 2021-11-28 | End: 2021-11-29 | Stop reason: HOSPADM

## 2021-11-28 RX ORDER — ENALAPRILAT 1.25 MG/ML
1.25 INJECTION INTRAVENOUS EVERY 6 HOURS PRN
Status: DISCONTINUED | OUTPATIENT
Start: 2021-11-28 | End: 2021-11-29 | Stop reason: HOSPADM

## 2021-11-28 RX ORDER — CLONIDINE 0.2 MG/24H
1 PATCH, EXTENDED RELEASE TRANSDERMAL ONCE
Status: DISCONTINUED | OUTPATIENT
Start: 2021-11-28 | End: 2021-11-29 | Stop reason: HOSPADM

## 2021-11-28 RX ORDER — MYCOPHENOLIC ACID 360 MG/1
360 TABLET, DELAYED RELEASE ORAL EVERY EVENING
Status: DISCONTINUED | OUTPATIENT
Start: 2021-11-28 | End: 2021-11-28

## 2021-11-28 RX ORDER — BISACODYL 10 MG
10 SUPPOSITORY, RECTAL RECTAL
Status: DISCONTINUED | OUTPATIENT
Start: 2021-11-28 | End: 2021-11-29 | Stop reason: HOSPADM

## 2021-11-28 RX ORDER — CLONIDINE HYDROCHLORIDE 0.1 MG/1
0.1 TABLET ORAL EVERY 6 HOURS PRN
Status: DISCONTINUED | OUTPATIENT
Start: 2021-11-28 | End: 2021-11-29 | Stop reason: HOSPADM

## 2021-11-28 RX ORDER — MYCOPHENOLATE MOFETIL 250 MG/1
500 CAPSULE ORAL
Status: DISCONTINUED | OUTPATIENT
Start: 2021-11-28 | End: 2021-11-29 | Stop reason: HOSPADM

## 2021-11-28 RX ORDER — HEPARIN SODIUM 5000 [USP'U]/ML
5000 INJECTION, SOLUTION INTRAVENOUS; SUBCUTANEOUS EVERY 8 HOURS
Status: DISCONTINUED | OUTPATIENT
Start: 2021-11-28 | End: 2021-11-28

## 2021-11-28 RX ORDER — HYDROXYZINE HYDROCHLORIDE 10 MG/1
10 TABLET, FILM COATED ORAL 3 TIMES DAILY PRN
Status: DISCONTINUED | OUTPATIENT
Start: 2021-11-28 | End: 2021-11-29 | Stop reason: HOSPADM

## 2021-11-28 RX ORDER — PANTOPRAZOLE SODIUM 40 MG/1
40 TABLET, DELAYED RELEASE ORAL 2 TIMES DAILY
Status: DISCONTINUED | OUTPATIENT
Start: 2021-11-28 | End: 2021-11-28

## 2021-11-28 RX ORDER — HYDROMORPHONE HYDROCHLORIDE 1 MG/ML
1 INJECTION, SOLUTION INTRAMUSCULAR; INTRAVENOUS; SUBCUTANEOUS
Status: DISCONTINUED | OUTPATIENT
Start: 2021-11-28 | End: 2021-11-29 | Stop reason: HOSPADM

## 2021-11-28 RX ORDER — MORPHINE SULFATE 4 MG/ML
1 INJECTION INTRAVENOUS ONCE
Status: COMPLETED | OUTPATIENT
Start: 2021-11-28 | End: 2021-11-28

## 2021-11-28 RX ORDER — POLYETHYLENE GLYCOL 3350 17 G/17G
1 POWDER, FOR SOLUTION ORAL
Status: DISCONTINUED | OUTPATIENT
Start: 2021-11-28 | End: 2021-11-29 | Stop reason: HOSPADM

## 2021-11-28 RX ORDER — SODIUM CHLORIDE 9 MG/ML
INJECTION, SOLUTION INTRAVENOUS CONTINUOUS
Status: ACTIVE | OUTPATIENT
Start: 2021-11-28 | End: 2021-11-28

## 2021-11-28 RX ORDER — OXYCODONE HYDROCHLORIDE 5 MG/1
5 TABLET ORAL EVERY 4 HOURS PRN
Status: DISCONTINUED | OUTPATIENT
Start: 2021-11-28 | End: 2021-11-29 | Stop reason: HOSPADM

## 2021-11-28 RX ORDER — HYDROXYCHLOROQUINE SULFATE 200 MG/1
200 TABLET, FILM COATED ORAL EVERY EVENING
Status: DISCONTINUED | OUTPATIENT
Start: 2021-11-28 | End: 2021-11-29 | Stop reason: HOSPADM

## 2021-11-28 RX ORDER — HYDROMORPHONE HYDROCHLORIDE 1 MG/ML
1 INJECTION, SOLUTION INTRAMUSCULAR; INTRAVENOUS; SUBCUTANEOUS ONCE
Status: COMPLETED | OUTPATIENT
Start: 2021-11-28 | End: 2021-11-28

## 2021-11-28 RX ORDER — OXYCODONE HYDROCHLORIDE 5 MG/1
5 TABLET ORAL EVERY 8 HOURS PRN
Qty: 15 TABLET | Refills: 0 | Status: SHIPPED | OUTPATIENT
Start: 2021-11-28 | End: 2021-12-03

## 2021-11-28 RX ORDER — OMEPRAZOLE 20 MG/1
20 CAPSULE, DELAYED RELEASE ORAL EVERY 12 HOURS
Status: DISCONTINUED | OUTPATIENT
Start: 2021-11-28 | End: 2021-11-29 | Stop reason: HOSPADM

## 2021-11-28 RX ORDER — ACETAMINOPHEN 325 MG/1
650 TABLET ORAL EVERY 6 HOURS PRN
Status: DISCONTINUED | OUTPATIENT
Start: 2021-11-28 | End: 2021-11-29 | Stop reason: HOSPADM

## 2021-11-28 RX ORDER — AMOXICILLIN 250 MG
2 CAPSULE ORAL 2 TIMES DAILY
Status: DISCONTINUED | OUTPATIENT
Start: 2021-11-28 | End: 2021-11-29 | Stop reason: HOSPADM

## 2021-11-28 RX ORDER — OXYCODONE HYDROCHLORIDE 5 MG/1
5 TABLET ORAL EVERY 4 HOURS PRN
Status: DISCONTINUED | OUTPATIENT
Start: 2021-11-28 | End: 2021-11-28

## 2021-11-28 RX ORDER — PREDNISONE 5 MG/1
5 TABLET ORAL EVERY EVENING
Status: DISCONTINUED | OUTPATIENT
Start: 2021-11-28 | End: 2021-11-29 | Stop reason: HOSPADM

## 2021-11-28 RX ADMIN — AMLODIPINE BESYLATE 5 MG: 5 TABLET ORAL at 09:15

## 2021-11-28 RX ADMIN — HYDROXYCHLOROQUINE SULFATE 200 MG: 200 TABLET, FILM COATED ORAL at 17:44

## 2021-11-28 RX ADMIN — PREDNISONE 5 MG: 5 TABLET ORAL at 17:43

## 2021-11-28 RX ADMIN — SENNOSIDES AND DOCUSATE SODIUM 2 TABLET: 50; 8.6 TABLET ORAL at 09:21

## 2021-11-28 RX ADMIN — OXYCODONE HYDROCHLORIDE 5 MG: 5 TABLET ORAL at 20:00

## 2021-11-28 RX ADMIN — DIPHENHYDRAMINE HYDROCHLORIDE 25 MG: 50 INJECTION INTRAMUSCULAR; INTRAVENOUS at 20:01

## 2021-11-28 RX ADMIN — OMEPRAZOLE 20 MG: 20 CAPSULE, DELAYED RELEASE ORAL at 17:51

## 2021-11-28 RX ADMIN — OMEPRAZOLE 20 MG: 20 CAPSULE, DELAYED RELEASE ORAL at 09:16

## 2021-11-28 RX ADMIN — HYDROMORPHONE HYDROCHLORIDE 1 MG: 1 INJECTION, SOLUTION INTRAMUSCULAR; INTRAVENOUS; SUBCUTANEOUS at 13:14

## 2021-11-28 RX ADMIN — HYDROXYZINE HYDROCHLORIDE 10 MG: 10 TABLET, FILM COATED ORAL at 09:17

## 2021-11-28 RX ADMIN — HYDROMORPHONE HYDROCHLORIDE 1 MG: 1 INJECTION, SOLUTION INTRAMUSCULAR; INTRAVENOUS; SUBCUTANEOUS at 06:35

## 2021-11-28 RX ADMIN — MORPHINE SULFATE 1 MG: 4 INJECTION INTRAVENOUS at 12:18

## 2021-11-28 RX ADMIN — SODIUM CHLORIDE: 9 INJECTION, SOLUTION INTRAVENOUS at 06:36

## 2021-11-28 RX ADMIN — CLONIDINE 1 PATCH: 0.2 PATCH TRANSDERMAL at 06:37

## 2021-11-28 RX ADMIN — HYDROMORPHONE HYDROCHLORIDE 1 MG: 1 INJECTION, SOLUTION INTRAMUSCULAR; INTRAVENOUS; SUBCUTANEOUS at 17:45

## 2021-11-28 RX ADMIN — ATENOLOL 25 MG: 25 TABLET ORAL at 09:16

## 2021-11-28 ASSESSMENT — ENCOUNTER SYMPTOMS
HEADACHES: 0
MYALGIAS: 1
SENSORY CHANGE: 0
BLURRED VISION: 0
COUGH: 0
PALPITATIONS: 1
DOUBLE VISION: 0
NAUSEA: 0
FEVER: 0
FOCAL WEAKNESS: 0
DIZZINESS: 0
SHORTNESS OF BREATH: 1
ABDOMINAL PAIN: 0
SORE THROAT: 0
VOMITING: 0
NERVOUS/ANXIOUS: 1
SPEECH CHANGE: 0
CHILLS: 0

## 2021-11-28 ASSESSMENT — PAIN DESCRIPTION - PAIN TYPE
TYPE: ACUTE PAIN

## 2021-11-28 ASSESSMENT — LIFESTYLE VARIABLES: SUBSTANCE_ABUSE: 0

## 2021-11-28 ASSESSMENT — FIBROSIS 4 INDEX: FIB4 SCORE: 1.38

## 2021-11-28 ASSESSMENT — PAIN DESCRIPTION - DESCRIPTORS: DESCRIPTORS: SHARP

## 2021-11-28 NOTE — ASSESSMENT & PLAN NOTE
Pt presents with chest pain, per chart review this appears to be a chronic issue   - states she was recently diagnosed with pericardial effusion, bedside US per ERP was negative for effusion   - will get limited echo to further evaluate   - trop is significantly elevated however this too is chronic and within her baseline values   - EKG without acute ischemic changes   - supportive care with pain control   - will monitor on tele

## 2021-11-28 NOTE — ED NOTES
Pt c/o Lt upper chest pain, slight improvement since arrival.  NS infusing at 30mL/hr via pump: IV site LT hand, wrapped w/ coban.  Pulse ox 100% at 4LNC. Pt states she uses home oxygen at 2LNC; rate decreased from 4 to 2LNC.

## 2021-11-28 NOTE — ED TRIAGE NOTES
Chief Complaint   Patient presents with   • Chest Pain     Reports pain across chest.   • Shortness of Breath     Pt reports SOB since last PM

## 2021-11-28 NOTE — ASSESSMENT & PLAN NOTE
Hypertensive urgency with 's and chest pain   Continue home cardiac medications   HD for volume   Prn antihypertensives   Pain control

## 2021-11-28 NOTE — ED NOTES
Med rec updated and complete  Allergies reviewed  Pt reports that she is not taking TERAZOSIN 1MG, and her PREDNISONE is 5MG, not 20 MG.   Pt reports no vitamins.  Pt reports no antibiotics in the last 30 days.    No current facility-administered medications on file prior to encounter.     Current Outpatient Medications on File Prior to Encounter   Medication Sig Dispense Refill   • acetaminophen (TYLENOL) 500 MG Tab Take 1,000 mg by mouth every 6 hours as needed for Moderate Pain.     • amLODIPine (NORVASC) 5 MG Tab Take 5 mg by mouth every day.     • mycophenolate sodium (MYFORTIC) 360 MG Tablet Delayed Response tablet Take 360 mg by mouth every evening.     • atenolol (TENORMIN) 25 MG Tab Take 25 mg by mouth every day.     • ondansetron (ZOFRAN ODT) 4 MG TABLET DISPERSIBLE Take 1 tablet by mouth every four hours as needed for Nausea. 30 tablet 0   • pantoprazole (PROTONIX) 40 MG Tablet Delayed Response Take 1 tablet by mouth 2 times a day. 30 tablet 3   • cloNIDine (CATAPRES) 0.2 MG/24HR PATCH WEEKLY patch Place 1 Patch on the skin every 7 days. On Sat     • predniSONE (DELTASONE) 5 MG Tab Take 5 mg by mouth every evening.     • hydroxychloroquine (PLAQUENIL) 200 MG Tab Take 200 mg by mouth every day.

## 2021-11-28 NOTE — H&P
"Hospital Medicine History & Physical Note    Date of Service  11/28/2021    Primary Care Physician  Ella Matthew M.D.    Consultants  nephrology    Specialist Names: Dr. Najjar    Code Status  Full Code    Chief Complaint  Chief Complaint   Patient presents with   • Chest Pain     Reports pain across chest.   • Shortness of Breath     Pt reports SOB since last PM       History of Presenting Illness  Lily Nicole is a 26 y.o. female with a complex past medical history of systemic lupus erythematosus, end-stage renal disease on hemodialysis, chronic anemia with history of multiple transfusions, and hypertension who presents with overall general malaise, chest pain and shortness of breath, pt states she was recently admitted for \"blood around the heart\", however I cannot find record of this in [facility]. She states she had her regular HD on Friday but continued to feel worse thus she presented here for further evaluation. She notes overall fatigue, and chest pain across her chest but reports chronic pain in general. She denies nausea, vomiting, diaphoresis or syncope.     On arrival here today she is tachycardic 106, RR 16, /114, 100% on 4L O2.  Labs are notable for WBC of 3.3, H&H 7/23.3, sodium 133, chloride 89, BUN 48, creatinine 5.78, troponin elevated at 678, BNP greater than 35,000.     Per ER bedside US was done and showed no pericardial effusion. 1 Unit of blood was ordered in ER and admission was requested for volume overload and anemia requiring transfusion.       I discussed the plan of care with patient and nephrology.    Review of Systems  Review of Systems   Constitutional: Positive for malaise/fatigue. Negative for chills and fever.   HENT: Negative for congestion and sore throat.    Eyes: Negative for blurred vision and double vision.   Respiratory: Positive for shortness of breath. Negative for cough.    Cardiovascular: Positive for chest pain, palpitations and leg swelling. "   Gastrointestinal: Negative for abdominal pain, nausea and vomiting.   Genitourinary: Negative for dysuria, frequency and urgency.   Musculoskeletal: Positive for myalgias.   Neurological: Negative for dizziness, sensory change, speech change, focal weakness and headaches.   Psychiatric/Behavioral: Negative for substance abuse. The patient is nervous/anxious.        Past Medical History   has a past medical history of Anemia (01/17/2018), AVF (arteriovenous fistula) ([facility]), Chest pain, Chest tightness, Daytime sleepiness, Dialysis patient ([facility]), Difficulty breathing, ESRD (end stage renal disease) on dialysis ([facility]) (01/17/2018), Gasping for breath, Heart burn, Hypertension (01/17/2018), Indigestion, Lupus ([facility]), Migraines (01/17/2018), Painful breathing, Palpitations, Seizure ([facility]) (2013), Shortness of breath, Swelling of lower extremity, and Wheezing.    Surgical History   has a past surgical history that includes ronak by laparoscopy (4/5/2010); av fistula creation (Right); angioplasty (01/17/2018); other; other; gastroscopy-endo (12/9/2019); gastroscopy (N/A, 5/30/2020); gastroscopy-endo (9/18/2020); pr upper gi endoscopy,ctrl bleed (11/12/2020); pr upper gi endoscopy,biopsy (11/12/2020); gastroscopy-endo (11/12/2020); pr colonoscopy,diagnostic (1/8/2021); pr upper gi endoscopy,diagnosis (1/8/2021); pr upper gi endoscopy,ctrl bleed (1/8/2021); pr upper gi endoscopy,diagnosis (3/5/2021); pr upper gi endoscopy,diagnosis (N/A, 3/19/2021); pr upper gi endoscopy,ctrl bleed (3/19/2021); and pr bronchoscopy,diagnostic (Bilateral, 5/13/2021).     Family History  family history includes Diabetes in her paternal grandmother.   Family history reviewed with patient. There is no family history that is pertinent to the chief complaint.     Social History   reports that she has never smoked. She has never used smokeless tobacco. She reports that she does not drink alcohol and does not use drugs.    Allergies  Allergies    Allergen Reactions   • Cephalexin Rash     Nausea and rash    • Clindamycin Rash     Nausea and rash      • Methylprednisolone Unspecified     Anxious   • Metoprolol Rash     Nausea and rash      • Fentanyl And Related Anxiety   • Maxipime [Cefepime] Itching   • Norco [Hydrocodone-Acetaminophen] Rash and Nausea     Generalized rash   • Reglan [Metoclopramide] Anxiety   • Tape        Medications  Prior to Admission Medications   Prescriptions Last Dose Informant Patient Reported? Taking?   acetaminophen (TYLENOL) 500 MG Tab 11/27/2021 at 1100 Patient Yes Yes   Sig: Take 1,000 mg by mouth every 6 hours as needed for Moderate Pain.   amLODIPine (NORVASC) 5 MG Tab 11/27/2021 at 0900 Patient Yes No   Sig: Take 5 mg by mouth every day.   atenolol (TENORMIN) 25 MG Tab 11/27/2021 at 0900 Patient Yes No   Sig: Take 25 mg by mouth every day.   cloNIDine (CATAPRES) 0.2 MG/24HR PATCH WEEKLY patch 11/27/2021 at 0900 Patient Yes No   Sig: Place 1 Patch on the skin every 7 days. On Sat   hydroxychloroquine (PLAQUENIL) 200 MG Tab 11/27/2021 at 0900 Patient Yes No   Sig: Take 200 mg by mouth every day.   mycophenolate sodium (MYFORTIC) 360 MG Tablet Delayed Response tablet 11/27/2021 at 2000 Patient Yes No   Sig: Take 360 mg by mouth every evening.   ondansetron (ZOFRAN ODT) 4 MG TABLET DISPERSIBLE >1 week at Unknown Patient No No   Sig: Take 1 tablet by mouth every four hours as needed for Nausea.   pantoprazole (PROTONIX) 40 MG Tablet Delayed Response 11/27/2021 at 2000 Patient No No   Sig: Take 1 tablet by mouth 2 times a day.   predniSONE (DELTASONE) 5 MG Tab 11/27/2021 at 2000 Patient Yes No   Sig: Take 5 mg by mouth every evening.      Facility-Administered Medications: None       Physical Exam  Pulse:  [106] 106  Resp:  [16] 16  BP: (151-172)/() 151/96  SpO2:  [100 %] 100 %  Blood Pressure: 151/96       Pulse: (!) 106   Respiration: 16   Pulse Oximetry: 100 %       Physical Exam  Vitals and nursing note reviewed.    Constitutional:       General: She is not in acute distress.     Appearance: Normal appearance. She is not ill-appearing or toxic-appearing.      Comments: Thin young female   HENT:      Head: Normocephalic and atraumatic.      Mouth/Throat:      Mouth: Mucous membranes are moist.      Pharynx: Oropharynx is clear.   Eyes:      Extraocular Movements: Extraocular movements intact.      Conjunctiva/sclera: Conjunctivae normal.   Cardiovascular:      Rate and Rhythm: Tachycardia present.      Heart sounds: Murmur (systolic murmur) heard.        Comments: Displaced PMI  Pulmonary:      Effort: Pulmonary effort is normal. No respiratory distress.      Breath sounds: Rales (bibasilar) present.   Abdominal:      General: Bowel sounds are normal. There is no distension.      Palpations: Abdomen is soft.      Tenderness: There is abdominal tenderness (diffuse mild tenderness on deep palpiation).   Musculoskeletal:         General: Normal range of motion.      Cervical back: Neck supple.      Comments: Mild non-pitting edema   Skin:     General: Skin is warm.      Comments: Rt upper arm AV fistula with good thrill   Neurological:      General: No focal deficit present.      Mental Status: She is alert and oriented to person, place, and time.   Psychiatric:         Mood and Affect: Mood normal.         Behavior: Behavior normal.         Thought Content: Thought content normal.         Laboratory:  Recent Labs     11/26/21  1610 11/28/21  0531   WBC 3.4* 3.3*   RBC 3.00* 2.64*   HEMOGLOBIN 7.9* 7.0*   HEMATOCRIT 26.1* 23.3*   MCV 87.0 88.3   MCH 26.3* 26.5*   MCHC 30.3* 30.0*   RDW 56.3* 57.2*   PLATELETCT 68* 116*   MPV 11.8 12.2     Recent Labs     11/28/21  0531   SODIUM 133*   POTASSIUM 4.5   CHLORIDE 89*   CO2 30   GLUCOSE 89   BUN 48*   CREATININE 5.78*   CALCIUM 8.9     Recent Labs     11/28/21  0531   ALTSGPT 30   ASTSGOT 24   ALKPHOSPHAT 100*   TBILIRUBIN 0.4   LIPASE 28   GLUCOSE 89     Recent Labs      11/28/21  0531   APTT 33.6   INR 1.19*     Recent Labs     11/28/21  0531   NTPROBNP >69787*         Recent Labs     11/28/21  0531   TROPONINT 678*       Imaging:  DX-CHEST-PORTABLE (1 VIEW)   Final Result      No acute cardiac or pulmonary abnormality is noted.          X-Ray:  My impression is: no acute pathology, lungs clear without consolidation or signifcant effusion  EKG:  My impression is: sinus tachycardia, no ischemic changes    Assessment/Plan:  I anticipate this patient will require at least two midnights for appropriate medical management, necessitating inpatient admission.    Acute on chronic respiratory failure (HCC)- (present on admission)  Assessment & Plan  Acute on chronic respiratory failure, currently on 2-4 L supplemental oxygen, states she uses oxygen intermittently at home   suspect from fluid overload  Nephrology consulted for HD   Will order limited echo to reevaluate cardiac function  Wean O2 as able   Encourage IS       Anemia due to chronic kidney disease- (present on admission)  Assessment & Plan  Chronic anemia requiring multiple transfusions, no current evidence of acute bleeding   Hgb 7.0 on admission, PRBC ordered by ER   Given multiple autoantibodies and volume overload issues would avoid aggressive resuscitation if Hgb remains stable >6.5   Monitor H/H and transfuse as needed       Chest pain- (present on admission)  Assessment & Plan  Pt presents with chest pain, per chart review this appears to be a chronic issue   - states she was recently diagnosed with pericardial effusion, bedside US per ERP was negative for effusion   - will get limited echo to further evaluate   - trop is significantly elevated however this too is chronic and within her baseline values   - EKG without acute ischemic changes   - supportive care with pain control   - will monitor on tele     Lupus (HCC)- (present on admission)  Assessment & Plan  Severe SLE with renal involvement   - continue home  immunosuppressive medications   - monitor closely, has outpatient follow up    HTN (hypertension)- (present on admission)  Assessment & Plan  Hypertensive urgency with 's and chest pain   Continue home cardiac medications   HD for volume   Prn antihypertensives   Pain control     ESRD (end stage renal disease) on dialysis (HCC)- (present on admission)  Assessment & Plan  ESRD, last session on Friday   - volume overload on exam with SOB/increased O2 needs which can be made worse with blood transfusion  - nephrology consulted, appreciate help with management   - renally dose all medication     Gastroesophageal reflux disease without esophagitis- (present on admission)  Assessment & Plan  Chronic, with hx of bleed   - no obvious bleeding at this time   - continue PPI therapy      VTE prophylaxis: pharmacologic prophylaxis contraindicated due to anemia requiring frequent tranfusion

## 2021-11-28 NOTE — ASSESSMENT & PLAN NOTE
Chronic anemia requiring multiple transfusions, no current evidence of acute bleeding   Hgb 7.0 on admission, PRBC ordered by ER   Given multiple autoantibodies and volume overload issues would avoid aggressive resuscitation if Hgb remains stable >6.5   Monitor H/H and transfuse as needed

## 2021-11-28 NOTE — ASSESSMENT & PLAN NOTE
Acute on chronic respiratory failure, currently on 2-4 L supplemental oxygen, states she uses oxygen intermittently at home   suspect from fluid overload  Nephrology consulted for HD   Will order limited echo to reevaluate cardiac function  Wean O2 as able   Encourage IS

## 2021-11-28 NOTE — ED PROVIDER NOTES
ED Provider Note    CHIEF COMPLAINT  Chief Complaint   Patient presents with   • Chest Pain     Reports pain across chest.   • Shortness of Breath     Pt reports SOB since last PM       HPI  Lily Nicole is a 26 y.o. female with longstanding hx of esrd, chronic chest pain and chronic anemia here with cc of SOB.  Patient reports ongoing chronic chest pain and chronic pain in general.  Patient took Tylenol at home without any improvement.  Patient reports that she was told she had fluid around her heart and around her vomiting around a week ago and reports that she believes this is where the pain is coming from.  Patient denies any associated fevers or chills.  Patient denies any associated cough.  Patient denies any nausea or vomiting.  She does report feeling short of breath similar to when she misses her dialysis.  Last dialysis session was on Friday, she normally goes Monday Wednesday Friday.  Patient was told that she was significantly anemic on her last CBC but did not follow-up for transfusion or follow-up labs.      REVIEW OF SYSTEMS  ROS    See HPI for further details. All other systems are negative.     PAST MEDICAL HISTORY   has a past medical history of Anemia (01/17/2018), AVF (arteriovenous fistula) ([facility]), Chest pain, Chest tightness, Daytime sleepiness, Dialysis patient ([facility]), Difficulty breathing, ESRD (end stage renal disease) on dialysis ([facility]) (01/17/2018), Gasping for breath, Heart burn, Hypertension (01/17/2018), Indigestion, Lupus ([facility]), Migraines (01/17/2018), Painful breathing, Palpitations, Seizure ([facility]) (2013), Shortness of breath, Swelling of lower extremity, and Wheezing.    SOCIAL HISTORY  Social History     Tobacco Use   • Smoking status: Never Smoker   • Smokeless tobacco: Never Used   Vaping Use   • Vaping Use: Never used   Substance and Sexual Activity   • Alcohol use: No   • Drug use: No   • Sexual activity: Not on file       SURGICAL HISTORY   has a past surgical history that  includes ronak by laparoscopy (4/5/2010); av fistula creation (Right); angioplasty (01/17/2018); other; other; gastroscopy-endo (12/9/2019); gastroscopy (N/A, 5/30/2020); gastroscopy-endo (9/18/2020); upper gi endoscopy,ctrl bleed (11/12/2020); upper gi endoscopy,biopsy (11/12/2020); gastroscopy-endo (11/12/2020); colonoscopy,diagnostic (1/8/2021); upper gi endoscopy,diagnosis (1/8/2021); upper gi endoscopy,ctrl bleed (1/8/2021); upper gi endoscopy,diagnosis (3/5/2021); upper gi endoscopy,diagnosis (N/A, 3/19/2021); upper gi endoscopy,ctrl bleed (3/19/2021); and bronchoscopy,diagnostic (Bilateral, 5/13/2021).    CURRENT MEDICATIONS  Home Medications     Reviewed by Leanne Patel R.N. (Registered Nurse) on 11/28/21 at 0506  Med List Status: <None>   Medication Last Dose Status   amLODIPine (NORVASC) 5 MG Tab  Active   atenolol (TENORMIN) 25 MG Tab  Active   cloNIDine (CATAPRES) 0.2 MG/24HR PATCH WEEKLY patch  Active   hydroxychloroquine (PLAQUENIL) 200 MG Tab  Active   mycophenolate sodium (MYFORTIC) 360 MG Tablet Delayed Response tablet  Active   ondansetron (ZOFRAN ODT) 4 MG TABLET DISPERSIBLE  Active   pantoprazole (PROTONIX) 40 MG Tablet Delayed Response  Active   predniSONE (DELTASONE) 5 MG Tab  Active                ALLERGIES  Allergies   Allergen Reactions   • Cephalexin Rash     Nausea and rash    • Clindamycin Rash     Nausea and rash      • Methylprednisolone Unspecified     Anxious   • Metoprolol Rash     Nausea and rash      • Fentanyl And Related Anxiety   • Maxipime [Cefepime] Itching   • Norco [Hydrocodone-Acetaminophen] Rash and Nausea     Generalized rash   • Reglan [Metoclopramide] Anxiety   • Tape        PHYSICAL EXAM  Vitals:    11/28/21 0501   BP: (!) 172/114   Pulse: (!) 106   SpO2: 100%       Physical Exam  Constitutional:       Appearance: She is well-developed.   HENT:      Head: Normocephalic and atraumatic.   Eyes:      Conjunctiva/sclera: Conjunctivae normal.   Cardiovascular:      Rate  and Rhythm: Normal rate and regular rhythm.      Comments: Cardiac ultrasound fails to reveal any significant pericardial effusion  Pulmonary:      Effort: Pulmonary effort is normal.      Comments: Decreased lung sounds in right base.  Abdominal:      General: Bowel sounds are normal. There is no distension.      Palpations: Abdomen is soft.      Tenderness: There is no abdominal tenderness. There is no rebound.   Musculoskeletal:      Cervical back: Normal range of motion and neck supple.   Skin:     General: Skin is warm and dry.      Findings: No rash.   Neurological:      Mental Status: She is alert and oriented to person, place, and time.   Psychiatric:         Behavior: Behavior normal.           DIAGNOSTIC STUDIES / PROCEDURES    EKG  See below    LABS  Results for orders placed or performed during the hospital encounter of 11/28/21   CBC w/ Differential   Result Value Ref Range    WBC 3.3 (L) 4.8 - 10.8 K/uL    RBC 2.64 (L) 4.20 - 5.40 M/uL    Hemoglobin 7.0 (L) 12.0 - 16.0 g/dL    Hematocrit 23.3 (L) 37.0 - 47.0 %    MCV 88.3 81.4 - 97.8 fL    MCH 26.5 (L) 27.0 - 33.0 pg    MCHC 30.0 (L) 33.6 - 35.0 g/dL    RDW 57.2 (H) 35.9 - 50.0 fL    Platelet Count 116 (L) 164 - 446 K/uL    MPV 12.2 9.0 - 12.9 fL    Neutrophils-Polys 78.20 (H) 44.00 - 72.00 %    Lymphocytes 13.20 (L) 22.00 - 41.00 %    Monocytes 5.50 0.00 - 13.40 %    Eosinophils 2.20 0.00 - 6.90 %    Basophils 0.60 0.00 - 1.80 %    Immature Granulocytes 0.30 0.00 - 0.90 %    Nucleated RBC 0.00 /100 WBC    Neutrophils (Absolute) 2.54 2.00 - 7.15 K/uL    Lymphs (Absolute) 0.43 (L) 1.00 - 4.80 K/uL    Monos (Absolute) 0.18 0.00 - 0.85 K/uL    Eos (Absolute) 0.07 0.00 - 0.51 K/uL    Baso (Absolute) 0.02 0.00 - 0.12 K/uL    Immature Granulocytes (abs) 0.01 0.00 - 0.11 K/uL    NRBC (Absolute) 0.00 K/uL   Complete Metabolic Panel (CMP)   Result Value Ref Range    Sodium 133 (L) 135 - 145 mmol/L    Potassium 4.5 3.6 - 5.5 mmol/L    Chloride 89 (L) 96 - 112  mmol/L    Co2 30 20 - 33 mmol/L    Anion Gap 14.0 7.0 - 16.0    Glucose 89 65 - 99 mg/dL    Bun 48 (H) 8 - 22 mg/dL    Creatinine 5.78 (HH) 0.50 - 1.40 mg/dL    Calcium 8.9 8.4 - 10.2 mg/dL    AST(SGOT) 24 12 - 45 U/L    ALT(SGPT) 30 2 - 50 U/L    Alkaline Phosphatase 100 (H) 30 - 99 U/L    Total Bilirubin 0.4 0.1 - 1.5 mg/dL    Albumin 2.9 (L) 3.2 - 4.9 g/dL    Total Protein 8.1 6.0 - 8.2 g/dL    Globulin 5.2 (H) 1.9 - 3.5 g/dL    A-G Ratio 0.6 g/dL   proBrain Natriuretic Peptide, NT   Result Value Ref Range    NT-proBNP >18148 (H) 0 - 125 pg/mL   Troponin STAT   Result Value Ref Range    Troponin T 678 (H) 6 - 19 ng/L   Lipase   Result Value Ref Range    Lipase 28 7 - 58 U/L   Magnesium   Result Value Ref Range    Magnesium 1.5 1.5 - 2.5 mg/dL   PT/INR   Result Value Ref Range    PT 14.2 12.0 - 14.6 sec    INR 1.19 (H) 0.87 - 1.13   APTT   Result Value Ref Range    APTT 33.6 24.7 - 36.0 sec   COD (ADULT)   Result Value Ref Range    ABO Grouping Only O     Rh Grouping Only POS     Antibody Screen-Cod POS (A)    LACTIC ACID   Result Value Ref Range    Lactic Acid 1.4 0.5 - 2.0 mmol/L   ESTIMATED GFR   Result Value Ref Range    GFR If  11 (A) >60 mL/min/1.73 m 2    GFR If Non African American 9 (A) >60 mL/min/1.73 m 2   EKG   Result Value Ref Range    Report       [facility] Emergency Dept.    Test Date:  2021  Pt Name:    CASE WOODSON            Department: EDS  MRN:        [id number]                      Room:       -ROOM 7  Gender:     Female                       Technician: [id number]  :        [id number]                   Requested By:ALBERT CORBIN  Order #:    [id number]                    Yefri MD: Alyssa Wade MD    Measurements  Intervals                                Axis  Rate:       103                          P:          99  NE:         161                          QRS:        -7  QRSD:       84                           T:          62  QT:          360  QTc:        471    Interpretive Statements  EKG is sinus tachycardia,  mild left axis deviation, normal intervals, no ST  elevation consistent with acute regional ischemia  Electronically Signed On 11- 6:42:43 PST by Alyssa Wade MD           RADIOLOGY  DX-CHEST-PORTABLE (1 VIEW)   Final Result      No acute cardiac or pulmonary abnormality is noted.      EC-ECHOCARDIOGRAM LTD W/O CONT    (Results Pending)           COURSE & MEDICAL DECISION MAKING  Pertinent Labs & Imaging studies reviewed. (See chart for details)    Patient with chronic chest pain.  From a chest pain perspective, this does not appear consistent with likely primary cardiopulmonary etiology.  I suspect this is actually withdrawal, patient is given narcotics regularly, typically these are done in the acute care setting, patient returns home and pain recurs.  We will give a dose of Dilaudid, but patient may be a good candidate for the buprenorphine chronically.  Patient's EKG fails to reveal any evidence of acute ischemia.  Patient bedside ultrasound fails to reveal any evidence of dilated right heart or pericardial effusion.  Patient hemoglobin is 7, she will need to be transfused.  As patient's BNP is significantly elevated and she is complaining of shortness of breath I do believe that she is volume overloaded.  Patient will need to be dialyzed after the unit of blood.  I discussed the case with nephrology and hospitalist.  Patient will be admitted for further care.    The patient will return for worsening symptoms and is stable at the time of discharge. The patient verbalizes understanding and will comply.    FINAL IMPRESSION  1. Anemia  Volume overload  1. Chest pain, unspecified type               Electronically signed by: Mauro Shah M.D., 11/28/2021 6:13 AM

## 2021-11-28 NOTE — CONSULTS
DATE OF SERVICE:  11/28/2021     REQUESTING PHYSICIAN:  Mauro Shah MD from the emergency room service.     REASON FOR CONSULTATION:  Management of respiratory failure in the setting of   end-stage renal disease, assessing the need for urgent dialysis.     The patient seen and examined, medical records were reviewed.     HISTORY OF PRESENT ILLNESS:  The patient is an unfortunate 26-year-old lady   who is very well known to our service.  She has a history of end-stage renal   disease, undergoes hemodialysis on Monday, Wednesday, Friday, recent recurrent   hospitalization for shortness of breath and anemia.  The patient presented to   the hospital this morning with shortness of breath and chest pain.  She   described the chest pain as midsternal, radiating to the back.  The patient   was found to be anemic also elevated BNP.  She has been admitted for possible   blood transfusion.  Also, we were called to manage her respiratory failure,   assess need for urgent dialysis.     The patient has no cough, no hemoptysis, no nausea, no vomiting.     Her last dialysis was on Friday, 11/26.     PAST MEDICAL HISTORY:  Significant for:  1.  End-stage renal disease.  2.  Lupus.  3.  Anemia.  4.  Hypertension.     ALLERGIES:  The list was reviewed.     SOCIAL HISTORY:  The patient has no smoking history.     FAMILY HISTORY:  Positive for diabetes in her grandmother.     MEDICATIONS:  Reviewed.     REVIEW OF SYSTEMS:  All systems were reviewed; they were negative except   outlined in the history of present illness.     PHYSICAL EXAMINATION:  GENERAL:  The patient appears ill with mild respiratory distress.  VITAL SIGNS:  Showed blood pressure 151/96, heart rate was 106, respiratory   rate was 16.  HEENT:  Normocephalic, atraumatic.  Sclerae are anicteric.  Pupils are   reactive.  Nose normal.  Mucous membranes moist.  NECK:  No lymphadenopathy, no JVD, no thyroid mass.  CHEST:  Normal.  LUNGS:  A few rales at the  bases.  HEART:  S1, S2.  Systolic ejection murmur.  ABDOMEN:  Soft, nontender.  No hepatosplenomegaly.  There is no inguinal   lymphadenopathy.  EXTREMITIES:  There is trace lower extremity edema.  SKIN:  No skin rash.  NEUROLOGIC:  The patient is alert and oriented x3.  MOOD:  The patient is anxious.     LABORATORY DATA:  Her recent labs were reviewed.     DIAGNOSTIC DATA:  She also had a chest x-ray.  I reviewed the image myself,   which showed airspace disease.     ASSESSMENT:  1.  End-stage renal disease.  2.  Respiratory failure.  3.  Anemia.  4.  Uncontrolled hypertension.     PLAN:   1.  We will plan urgent dialysis to manage her respiratory failure.  2.  Epogen with dialysis.  3.  Low sodium diet.  4.  Renal dose all medications.  5.  Evaluate daily for the need of dialysis.  6.  Prognosis is guarded.     Plan discussed in detail with Dr. Rudolph.        ______________________________  FADI NAJJAR, MD FN/AYANNA    DD:  11/28/2021 13:24  DT:  11/28/2021 14:34    Job#:  [id number]

## 2021-11-28 NOTE — ED NOTES
Arianna from Lab called with critical result of Troponin greater than 100 and Creatine 5.78 at 0606. Critical lab result read back to Arianna.    notified of critical lab result at 0607.  Critical lab result read back by Dr. Cormier.

## 2021-11-28 NOTE — ASSESSMENT & PLAN NOTE
Severe SLE with renal involvement   - continue home immunosuppressive medications   - monitor closely, has outpatient follow up   show

## 2021-11-28 NOTE — PROGRESS NOTES
Called [facility] Jean Paul sousa, to notify of blood release and request ETA. Bedside RN, Corinne, notified this RN that ER stated the blood would have to come from [facility]. This RN transferred to [facility] blood bank at [phone] and spoke with Brad who notified RN that pt blood needed to be collected and sent to [place] to be analyzed r/t antibodies and past transfusion reactions, then we would be able to transfuse pt with blood. If pt needed blood, MD would have to put in an emergency release. RN notified that this process would take at least 3 days. RN notified Dr. Rudolph, Dr. Pierre, and RACHEL Mccurdy.

## 2021-11-28 NOTE — PROGRESS NOTES
Received report from MICHELLE Travis. Pt arrived to unit at 0955 via gurney escorted by transport. Assessment complete. VSS. No signs of distress noted at this time. Tele monitor in place. Monitor room notified. Pt c/o pain 9/10. Fall precautions and appropriate signs in place. Pt oriented to unit routine, call light/phone system within reach. Personal belongings within reach. RN extension number provided. Pt educated regarding fall precautions. Pt denies any further needs at this time.

## 2021-11-28 NOTE — DISCHARGE SUMMARY
"Discharge Summary    CHIEF COMPLAINT ON ADMISSION  Chief Complaint   Patient presents with   • Chest Pain     Reports pain across chest.   • Shortness of Breath     Pt reports SOB since last PM       Reason for Admission  EMS     Admission Date  11/28/2021    CODE STATUS  Prior    HPI & HOSPITAL COURSE  This is a 26 y.o. female here with Lily URIBE Martín Nicole is a 26 y.o. female with a complex past medical history of systemic lupus erythematosus, end-stage renal disease on hemodialysis, chronic anemia with history of multiple transfusions and autoantibodies, hypertension and chronic pain with multiple ER visits for pain related issues who presents with overall general malaise, chest pain and shortness of breath, pt states she was recently admitted for \"blood around the heart\", however I cannot find record of this in [facility]. She states she had her regular HD on Friday but continued to feel worse thus she presented here for further evaluation. Due to frequent visits due to need for transfusion, she was supposed to present to lab today for blood work and subsequent transfusion on 12/1 however she presented here. Blood transfusion was ordered on admission for Hgb 7.0 however this was cancelled as she has no evidence of acute blood loss and this Hgb is actually her baseline, in addition she has high risk for reaction given her antibodies. Given this and after discussion with blood bank as well as nephrology, pt will follow up with outpatient infusion services tomorrow for testing and planned transfusion (if needed) per their protocol as needed.   Nephrology did evaluate the patient and she did receive hemodialysis today with resumption of her outpatient hemodialysis tomorrow, Monday.   In regards to her chest pain, her EKG shows no ischemic changes, her chest xray was clear, bedside US showed no pericardial effusion. Troponin although elevated is consistent with her baseline and trending of it remained stable.  " "Echocardiogram was also done which showed no new acute changes, normal EF and trivial pericardial effusion, there was also normal inspiratory collapse of her IVC suggesting against significant volume overload. Patient does have chronic chest pain, I suspect she may have a component of chronic pericarditis or pleuritis. She did have lower extremity doppler done which was negative for DVT making PE less likely, given pt is at her normal baseline Oxygen, vitals stable, PE likelyhood is low thus CTPE study was not done. She has also been receiving IV pain medications consistently throughout her multiple hospital visits/admissions since a young child and likely has developed some level of dependence and high tolerance for pain medications. Highly recommend that she follow up with an outpatient pain specialist as many of her visits to the hospital are due to pain related issues and better control of her chronic pain may prevent recurrent visits and admissions, referral was placed on discharge. She was also given a 5 day course of pain medications to help alleviate her suffering.     Plan was discussed with the patient and her parents at bedside, they understands and agrees with plan.     Therefore, she is discharged in fair and stable condition to home with close outpatient follow-up.    The patient recovered much more quickly than anticipated on admission.    Discharge Date  11/28/2021    FOLLOW UP ITEMS POST DISCHARGE  H/H and need for transfusion   Recommend pain specialist or pain medication agreement with PCP   Chronic medical management     DISCHARGE DIAGNOSES  Active Problems:    ESRD (end stage renal disease) on dialysis (HCC) POA: Yes    HTN (hypertension) POA: Yes      Overview: \"Controlled with medication\"    Lupus (HCC) POA: Yes    Chest pain POA: Yes    Anemia due to chronic kidney disease POA: Yes    Acute on chronic respiratory failure (HCC) POA: Yes    Gastroesophageal reflux disease without esophagitis " POA: Yes  Resolved Problems:    * No resolved hospital problems. *      FOLLOW UP  Future Appointments   Date Time Provider Department Center   12/1/2021 11:30 AM [facility] [address]   12/10/2021  3:00 PM [facility]   12/12/2021  8:45 AM [facility]   12/24/2021  3:15 PM [facility]   12/26/2021  [facility]     Ella Matthew M.D.  [address] [facility] [address]  [phone]    Schedule an appointment as soon as possible for a visit in 1 week  hospital follow up      MEDICATIONS ON DISCHARGE     Medication List      START taking these medications      Instructions   oxyCODONE immediate-release 5 MG Tabs  Commonly known as: ROXICODONE   Take 1 Tablet by mouth every 8 hours as needed for up to 5 days.  Dose: 5 mg        CONTINUE taking these medications      Instructions   acetaminophen 500 MG Tabs  Commonly known as: TYLENOL   Take 1,000 mg by mouth every 6 hours as needed for Moderate Pain.  Dose: 1,000 mg     amLODIPine 5 MG Tabs  Commonly known as: NORVASC   Take 5 mg by mouth every day.  Dose: 5 mg     atenolol 25 MG Tabs  Commonly known as: TENORMIN   Take 25 mg by mouth every day.  Dose: 25 mg     cloNIDine 0.2 MG/24HR Ptwk patch  Commonly known as: CATAPRES   Place 1 Patch on the skin every 7 days. On Sat  Dose: 1 Patch     hydroxychloroquine 200 MG Tabs  Commonly known as: Plaquenil   Take 200 mg by mouth every day.  Dose: 200 mg     mycophenolate sodium 360 MG Tbec tablet  Commonly known as: MYFORTIC   Take 360 mg by mouth every evening.  Dose: 360 mg     ondansetron 4 MG Tbdp  Commonly known as: ZOFRAN ODT   Take 1 tablet by mouth every four hours as needed for Nausea.  Dose: 4 mg     pantoprazole 40 MG Tbec  Commonly known as: PROTONIX   Take 1 tablet by mouth 2 times a day.  Dose: 40 mg     predniSONE 5 MG Tabs  Commonly known as: DELTASONE   Take 5 mg by mouth every evening.  Dose: 5  mg            Allergies  Allergies   Allergen Reactions   • Cephalexin Rash     Nausea and rash    • Clindamycin Rash     Nausea and rash      • Methylprednisolone Unspecified     Anxious   • Metoprolol Rash     Nausea and rash      • Fentanyl And Related Anxiety   • Maxipime [Cefepime] Itching   • Norco [Hydrocodone-Acetaminophen] Rash and Nausea     Generalized rash   • Reglan [Metoclopramide] Anxiety   • Tape        DIET  No orders of the defined types were placed in this encounter.      ACTIVITY  As tolerated.      CONSULTATIONS  Nephrology     PROCEDURES  NA    LABORATORY  Lab Results   Component Value Date    SODIUM 133 (L) 11/28/2021    POTASSIUM 4.5 11/28/2021    CHLORIDE 89 (L) 11/28/2021    CO2 30 11/28/2021    GLUCOSE 89 11/28/2021    BUN 48 (H) 11/28/2021    CREATININE 5.78 (HH) 11/28/2021        Lab Results   Component Value Date    WBC 3.3 (L) 11/28/2021    HEMOGLOBIN 7.0 (L) 11/28/2021    HEMATOCRIT 23.3 (L) 11/28/2021    PLATELETCT 116 (L) 11/28/2021        Total time of the discharge process exceeds 36 minutes.

## 2021-11-28 NOTE — ASSESSMENT & PLAN NOTE
ESRD, last session on Friday   - volume overload on exam with SOB/increased O2 needs which can be made worse with blood transfusion  - nephrology consulted, appreciate help with management   - renally dose all medication

## 2021-11-28 NOTE — PROGRESS NOTES
"Called by nursing that due to patients antibodies and hx of transfusion reaction, she need to have COD repeated and sent to [facility] lab for testing and would likely be a 3 day process to receive blood on an \"non-emergent\" basis. Given patients Hgb 7 which is consistent with her baseline and no evidence of acute bleeding, do not believe she needs blood emergently. We will continue to monitor for signs and symptoms of worsening anemia, will repeat H/H tonight and re-evaluate, will not pursue frequent H/H monitoring as this will likely worsen her anemia. Spoke with nursing and updated on plan of care.     Preethi Rudolph M.D.      "

## 2021-11-29 LAB — EKG IMPRESSION: NORMAL

## 2021-11-29 PROCEDURE — 93010 ELECTROCARDIOGRAM REPORT: CPT | Performed by: INTERNAL MEDICINE

## 2021-11-29 NOTE — PROGRESS NOTES
Discharge instructions and prescriptions explained and provided to patient and mom. Patient and mom verbalized understanding. IV removed, tip intact. Pt discharged home via wheelchair with all personal belongings.

## 2021-11-29 NOTE — PROGRESS NOTES
HD treatment ordered by Dr Najjar started at 1740 and ended at 2040 with net UF of 2,230 ml. Unable to remove prescribed UF goal due to pt c/o lightheadedness and blood pressure dropped into 99/34 mm hg at 1955. Normal saline flush was given 100 ml and bp went up to 111/74 mm hg. Held sites for 20 minutes total on both ports and no bleeding noted. Gave report to gabrielle Edwards RN. See flow sheet for details.

## 2021-11-29 NOTE — PROGRESS NOTES
1952 Dr. Lee notified that patient had increased depression on tele monitor per patient she is still having pain 10/10 radiating to lower back. Patient is still getting dialysis at this time. Per patient this is the same kid of pain that brought her in. Per Dr. Lee patient has had an extensive work up and this is a chronic issue. Order for EKG and Troponin ordered. Patient did also complain of nausea per Dr. Lee give PRN Benadryl that patient has ordered. Vital signs are otherwise WDL. Mom is at bedside. Patient to be discharge when dialysis and EKG compete.

## 2021-11-29 NOTE — DISCHARGE INSTRUCTIONS
"Discharge Instructions    Discharged to home by car with relative. Discharged via wheelchair, hospital escort: Yes.  Special equipment needed: Not Applicable    Be sure to schedule a follow-up appointment with your primary care doctor or any specialists as instructed.     Discharge Plan: Follow up with infusion center tomorrow for your lab work appointment, this is imperative for treatment of your chronic anemia   Follow up with HD as scheduled, you should go to your appointment tomorrow   Referral to a pain specialist was made, they will call you. I urge you to discuss this with your PCP as well as better pain control may reduce your hospital visits     I understand that a diet low in cholesterol, fat, and sodium is recommended for good health. Unless I have been given specific instructions below for another diet, I accept this instruction as my diet prescription.   Other diet:   Eating Plan for Dialysis  Dialysis is a treatment that cleans your blood. It is used when your kidneys are damaged. When you need dialysis, you should watch what you eat. This is because some nutrients can build up in your blood between treatments and make you sick.  Your doctor or diet specialist (dietitian) will:  · Tell you what nutrients you should include or avoid.  · Tell you how much of these nutrients you should get each day.  · Help you plan meals.  · Tell you how much to drink each day.  What are tips for following this plan?  Reading food labels  · Check food labels for:  ? Potassium. This is found in milk, fruits, and vegetables.  ? Phosphorus. This is found in milk, cheese, beans, nuts, and carbonated beverages.  ? Salt (sodium). This is in processed meats, cured meats, ready-made frozen meals, canned vegetables, and salty snack foods.  · Try to find foods that are low in potassium, phosphorus, and sodium.  · Look for foods that are labeled \"sodium free,\" \"reduced sodium,\" or \"low sodium.\"  Shopping  · Do not buy whole-grain " and high-fiber foods.  · Do not buy or use salt substitutes.  · Do not buy processed foods.  Cooking  · Drain all fluid from cooked vegetables and canned fruits before you eat them.  · Before you cook potatoes, cut them into small pieces. Then boil them in unsalted water.  · Try using herbs and spices that do not contain sodium to add flavor.  Meal planning  Most people on dialysis should try to eat:  · 6-11 servings of grains each day. One serving is equal to 1 slice of bread or ½ cup of cooked rice or pasta.  · 2-3 servings of low-potassium vegetables each day. One serving is equal to ½ cup.  · 2-3 servings of low-potassium fruits each day. One serving is equal to ½ cup.  · Protein, such as meat, poultry, fish, and eggs. Talk with your doctor or dietitian about the right amount and type of protein to eat.  · ½ cup of dairy each day.  General information  · Follow your doctor's instructions about how much to drink. You may be told to:  ? Write down what you drink.  ? Write down the foods you eat that are made mostly from water, such as gelatin and soups.  ? Drink from small cups.  · Take vitamin and mineral supplements only as told by your doctor.  · Take over-the-counter and prescription medicines only as told by your doctor.  What foods can I eat?         Fruits  Apples. Fresh or frozen berries. Fresh or canned pears, peaches, and pineapple. Grapes. Plums.  Vegetables  Fresh or frozen broccoli, carrots, and green beans. Cabbage. Cauliflower. Celery. Cucumbers. Eggplant. Radishes. Zucchini.  Grains  White bread. White rice. Cooked cereal. Unsalted popcorn. Tortillas. Pasta.  Meats and other proteins  Fresh or frozen beef, pork, chicken, and fish. Eggs.  Dairy  Cream cheese. Heavy cream. Ricotta cheese.  Beverages  Apple cider. Cranberry juice. Grape juice. Lemonade. Black coffee. Rice milk (that is not enriched or fortified).  Seasonings and condiments  Herbs. Spices. Jam and jelly. Honey.  Sweets and  desserts  Sherbet. Cakes. Cookies.  Fats and oils  Olive oil, canola oil, and safflower oil.  Other foods  Non-dairy creamer. Non-dairy whipped topping. Homemade broth without salt.  The items listed above may not be a complete list of foods and beverages you can eat. Contact your dietitian for more options.  What foods should I avoid?  Fruits  Star fruit. Bananas. Oranges. Kiwi. Nectarines. Prunes. Melon. Dried fruit. Avocado.  Vegetables  Potatoes. Beets. Tomatoes. Winter squash and pumpkin. Asparagus. Spinach. Parsnips.  Grains  Whole-grain bread. Whole-grain pasta. High-fiber cereal.  Meats and other proteins  Canned, smoked, and cured meats. Packaged lunch meat. Sardines. Nuts and seeds. Peanut butter. Beans and legumes.  Dairy  Milk. Buttermilk. Yogurt. Cheese and cottage cheese. Processed cheese spreads.  Beverages  Orange juice. Prune juice. Carbonated soft drinks.  Seasonings and condiments  Salt. Salt substitutes. Soy sauce.  Sweets and desserts  Ice cream. Chocolate. Candied nuts.  Fats and oils  Butter. Margarine.  Other foods  Ready-made frozen meals. Canned soups.  The items listed above may not be a complete list of foods and beverages you should avoid. Contact your dietitian for more information.  Summary  · If you are having dialysis, it is important to watch what you eat. Certain nutrients and wastes can build up in your blood and cause you to get sick.  · Your dietitian will help you make an eating plan that meets your needs.  · Avoid foods that are high in potassium, salt (sodium), and phosphorus. Restrict fluids as told by your doctor or dietitian.  This information is not intended to replace advice given to you by your health care provider. Make sure you discuss any questions you have with your health care provider.  Document Released: 06/18/2013 Document Revised: 03/06/2019 Document Reviewed: 12/19/2018  Elsevier Patient Education © 2020 [employer]      Systemic Lupus Erythematosus,  Adult  Systemic lupus erythematosus (SLE) is a long-term (chronic) disease that can affect many parts of the body. SLE is an autoimmune disease. With this type of disease, the body's defense system (immune system) mistakenly attacks healthy tissues. This can cause damage to the skin, joints, blood vessels, brain, kidneys, lungs, heart, and other internal organs. It causes pain, irritation, and inflammation.  What are the causes?  The cause of this condition is not known.  What increases the risk?  The following factors may make you more likely to develop this condition:  · Being female.  · Being of , , or -American descent.  · Having a family history of the condition.  · Being exposed to tobacco smoke or smoking cigarettes.  · Having an infection with a virus, such as Bubba-Barr virus.  · Having a history of exposure to silica dust, metals, chemicals, mold or mildew, or insecticides.  · Using oral contraceptives or hormone replacement therapy.  What are the signs or symptoms?  This condition can affect almost any organ or system in the body. Symptoms of the condition depend on which organ or system is affected.  The most common symptoms include:  · Fever.  · Fatigue.  · Weight loss.  · Muscle aches.  · Joint pain.  · Skin rashes, especially over the nose and cheeks (butterfly rash) and after sun exposure.  Symptoms can come and go. A period of time when symptoms get worse or come back is called a flare. A period of time with no symptoms is called a remission.  How is this diagnosed?  This condition is diagnosed based on:  · Your symptoms.  · Your medical history.  · A physical exam.  You may also have tests, including:  · Blood tests.  · Urine tests.  · A chest X-ray.  You may be referred to an autoimmune disease specialist (rheumatologist).  How is this treated?  There is no cure for this condition, but treatment can help to control symptoms, prevent flares (keep symptoms in remission), and  prevent damage to the heart, lungs, kidneys, and other organs. Treatment will depend on what symptoms you are having and what organs or systems are affected. Treatment may involve taking a combination of medicines over time.  Common medicines used to treat this condition include:  · Antimalarial medicines to control symptoms, prevent flares, and protect against organ damage.  · Corticosteroids and NSAIDs to reduce inflammation.  · Medicines to weaken your immune system (immunosuppressants).  · Biologic response modifiers to reduce inflammation and damage.  Follow these instructions at home:  Eating and drinking  · Eat a heart-healthy diet. This may include:  ? Eating high-fiber foods, such as fresh fruits and vegetables, whole grains, and beans.  ? Eating heart-healthy fats (omega-3 fats), such as fish, flaxseed, and flaxseed oil.  ? Limiting foods that are high in saturated fat and cholesterol, such as processed and fried foods, fatty meat, and full-fat dairy.  ? Limiting how much salt (sodium) you eat.  · Include calcium and vitamin D in your diet. Good sources of calcium and vitamin D include:  ? Low-fat dairy products such as milk, yogurt, and cheese.  ? Certain fish, such as fresh or canned salmon, tuna, and sardines.  ? Products that have calcium and vitamin D added to them (fortified products), such as fortified cereals or juice.  Medicines  · Take over-the-counter and prescription medicines only as told by your health care provider.  · Do not take any medicines that contain estrogen without first checking with your health care provider. Estrogen can trigger flares and may increase your risk for blood clots.  Lifestyle         · Stay active, as directed by your health care provider.  · Do not use any products that contain nicotine or tobacco, such as cigarettes and e-cigarettes. If you need help quitting, ask your health care provider.  · Protect your skin from the sun by applying sunblock and wearing  protective hats and clothing.  · Learn as much as you can about your condition and have a good support system in place. Support may come from family, friends, or a lupus support group.  General instructions  · Work closely with all of your health care providers to manage your condition.  · Stay up to date on all vaccines as directed by your health care provider.  · Keep all follow-up visits as told by your health care provider. This is important.  Contact a health care provider if:  · You have a fever.  · Your symptoms flare.  · You develop new symptoms.  · You have bloody, foamy, or coffee-colored urine.  · There are changes in your urination. For example, you urinate more often at night.  · You think that you may be depressed or have anxiety.  · You become pregnant or plan to become pregnant. Pregnancy in women with this condition is considered high risk.  Get help right away if:  · You have chest pain.  · You have trouble breathing.  · You have a seizure.  · You suddenly get a very bad headache.  · You suddenly develop facial or body weakness.  · You cannot speak.  · You cannot understand speech.  These symptoms may represent a serious problem that is an emergency. Do not wait to see if the symptoms will go away. Get medical help right away. Call your local emergency services (911 in the [place] Do not drive yourself to the hospital.  Summary  · Systemic lupus erythematosus (SLE) is a long-term disease that can affect many parts of the body.  · SLE is an autoimmune disease. That means your body's defense system (immune system) mistakenly attacks healthy tissues.  · There is no cure for this condition, but treatment can help to control symptoms, prevent flares, and prevent damage to your organs. Treatment may involve taking a combination of medicines over time.  This information is not intended to replace advice given to you by your health care provider. Make sure you discuss any questions you have with your health  care provider.  Document Released: 12/08/2003 Document Revised: 01/31/2019 Document Reviewed: 01/25/2019  Elsevier Patient Education © 2020 [employer] Inc[employer]        Special Instructions: None    · Is patient discharged on Warfarin / Coumadin?   No     Depression / Suicide Risk    As you are discharged from this [facility] facility, it is important to learn how to keep safe from harming yourself.    Recognize the warning signs:  · Abrupt changes in personality, positive or negative- including increase in energy   · Giving away possessions  · Change in eating patterns- significant weight changes-  positive or negative  · Change in sleeping patterns- unable to sleep or sleeping all the time   · Unwillingness or inability to communicate  · Depression  · Unusual sadness, discouragement and loneliness  · Talk of wanting to die  · Neglect of personal appearance   · Rebelliousness- reckless behavior  · Withdrawal from people/activities they love  · Confusion- inability to concentrate     If you or a loved one observes any of these behaviors or has concerns about self-harm, here's what you can do:  · Talk about it- your feelings and reasons for harming yourself  · Remove any means that you might use to hurt yourself (examples: pills, rope, extension cords, firearm)  · Get professional help from the community (Mental Health, Substance Abuse, psychological counseling)  · Do not be alone:Call your Safe Contact- someone whom you trust who will be there for you.  · Call your local CRISIS HOTLINE 080-9965 or 515-095-4445  · Call your local Children's Mobile Crisis Response Team [place] (211) 225-2270 or www.[employer]  · Call the toll free National Suicide Prevention Hotlines   · National Suicide Prevention Lifeline 166-240-KJKV (7570)  · National Hope Line Network 800-SUICIDE (609-6062)                    Follow up with [facility] center tomorrow for your lab work appointment, this is imperative for treatment of your  chronic anemia   Follow up with HD as scheduled, you should go to your appointment tomorrow   Referral to a pain specialist was made, they will call you. I urge you to discuss this with your PCP as well as better pain control may reduce your hospital visits

## 2021-11-30 ENCOUNTER — HOSPITAL ENCOUNTER (EMERGENCY)
Facility: MEDICAL CENTER | Age: 26
End: 2021-12-01
Attending: EMERGENCY MEDICINE
Payer: COMMERCIAL

## 2021-11-30 ENCOUNTER — APPOINTMENT (OUTPATIENT)
Dept: RADIOLOGY | Facility: MEDICAL CENTER | Age: 26
End: 2021-11-30
Attending: EMERGENCY MEDICINE
Payer: COMMERCIAL

## 2021-11-30 ENCOUNTER — OUTPATIENT INFUSION SERVICES (OUTPATIENT)
Dept: ONCOLOGY | Facility: MEDICAL CENTER | Age: 26
End: 2021-11-30
Attending: INTERNAL MEDICINE
Payer: COMMERCIAL

## 2021-11-30 VITALS
BODY MASS INDEX: 18.32 KG/M2 | RESPIRATION RATE: 18 BRPM | TEMPERATURE: 97 F | HEART RATE: 89 BPM | OXYGEN SATURATION: 100 % | DIASTOLIC BLOOD PRESSURE: 117 MMHG | WEIGHT: 113.98 LBS | HEIGHT: 66 IN | SYSTOLIC BLOOD PRESSURE: 174 MMHG

## 2021-11-30 VITALS
WEIGHT: 118.39 LBS | SYSTOLIC BLOOD PRESSURE: 192 MMHG | RESPIRATION RATE: 20 BRPM | HEART RATE: 95 BPM | BODY MASS INDEX: 18.58 KG/M2 | HEIGHT: 67 IN | OXYGEN SATURATION: 97 % | TEMPERATURE: 97 F | DIASTOLIC BLOOD PRESSURE: 112 MMHG

## 2021-11-30 DIAGNOSIS — D64.9 SYMPTOMATIC ANEMIA: ICD-10-CM

## 2021-11-30 DIAGNOSIS — N18.6 STAGE 5 CHRONIC KIDNEY DISEASE ON CHRONIC DIALYSIS (HCC): ICD-10-CM

## 2021-11-30 DIAGNOSIS — Z99.2 STAGE 5 CHRONIC KIDNEY DISEASE ON CHRONIC DIALYSIS (HCC): ICD-10-CM

## 2021-11-30 DIAGNOSIS — D64.9 ANEMIA, UNSPECIFIED TYPE: ICD-10-CM

## 2021-11-30 DIAGNOSIS — R07.9 CHEST PAIN, UNSPECIFIED TYPE: ICD-10-CM

## 2021-11-30 LAB
ABO GROUP BLD: NORMAL
ALBUMIN SERPL BCP-MCNC: 3.6 G/DL (ref 3.2–4.9)
ALBUMIN/GLOB SERPL: 0.7 G/DL
ALP SERPL-CCNC: 190 U/L (ref 30–99)
ALT SERPL-CCNC: 70 U/L (ref 2–50)
ANION GAP SERPL CALC-SCNC: 12 MMOL/L (ref 7–16)
ANISOCYTOSIS BLD QL SMEAR: ABNORMAL
AST SERPL-CCNC: 46 U/L (ref 12–45)
BARCODED ABORH UBTYP: 5100
BARCODED ABORH UBTYP: 5100
BARCODED PRD CODE UBPRD: NORMAL
BARCODED PRD CODE UBPRD: NORMAL
BARCODED UNIT NUM UBUNT: NORMAL
BARCODED UNIT NUM UBUNT: NORMAL
BASOPHILS # BLD AUTO: 0.4 % (ref 0–1.8)
BASOPHILS # BLD AUTO: 0.6 % (ref 0–1.8)
BASOPHILS # BLD: 0.02 K/UL (ref 0–0.12)
BASOPHILS # BLD: 0.03 K/UL (ref 0–0.12)
BILIRUB SERPL-MCNC: 0.5 MG/DL (ref 0.1–1.5)
BLD GP AB SCN SERPL QL: NORMAL
BUN SERPL-MCNC: 48 MG/DL (ref 8–22)
CALCIUM SERPL-MCNC: 9.3 MG/DL (ref 8.5–10.5)
CHLORIDE SERPL-SCNC: 91 MMOL/L (ref 96–112)
CO2 SERPL-SCNC: 32 MMOL/L (ref 20–33)
COMMENT 1642: NORMAL
COMPONENT R 8504R: NORMAL
COMPONENT R 8504R: NORMAL
CREAT SERPL-MCNC: 4.49 MG/DL (ref 0.5–1.4)
EKG IMPRESSION: NORMAL
EOSINOPHIL # BLD AUTO: 0.01 K/UL (ref 0–0.51)
EOSINOPHIL # BLD AUTO: 0.06 K/UL (ref 0–0.51)
EOSINOPHIL NFR BLD: 0.2 % (ref 0–6.9)
EOSINOPHIL NFR BLD: 1.2 % (ref 0–6.9)
ERYTHROCYTE [DISTWIDTH] IN BLOOD BY AUTOMATED COUNT: 53.6 FL (ref 35.9–50)
ERYTHROCYTE [DISTWIDTH] IN BLOOD BY AUTOMATED COUNT: 61.7 FL (ref 35.9–50)
GLOBULIN SER CALC-MCNC: 4.9 G/DL (ref 1.9–3.5)
GLUCOSE SERPL-MCNC: 113 MG/DL (ref 65–99)
HCT VFR BLD AUTO: 23.5 % (ref 37–47)
HCT VFR BLD AUTO: 30.9 % (ref 37–47)
HGB BLD-MCNC: 6.9 G/DL (ref 12–16)
HGB BLD-MCNC: 9.7 G/DL (ref 12–16)
IMM GRANULOCYTES # BLD AUTO: 0.04 K/UL (ref 0–0.11)
IMM GRANULOCYTES # BLD AUTO: 0.07 K/UL (ref 0–0.11)
IMM GRANULOCYTES NFR BLD AUTO: 0.8 % (ref 0–0.9)
IMM GRANULOCYTES NFR BLD AUTO: 1.2 % (ref 0–0.9)
LYMPHOCYTES # BLD AUTO: 0.36 K/UL (ref 1–4.8)
LYMPHOCYTES # BLD AUTO: 0.52 K/UL (ref 1–4.8)
LYMPHOCYTES NFR BLD: 10.3 % (ref 22–41)
LYMPHOCYTES NFR BLD: 6.4 % (ref 22–41)
MACROCYTES BLD QL SMEAR: ABNORMAL
MCH RBC QN AUTO: 27 PG (ref 27–33)
MCH RBC QN AUTO: 28.1 PG (ref 27–33)
MCHC RBC AUTO-ENTMCNC: 29.4 G/DL (ref 33.6–35)
MCHC RBC AUTO-ENTMCNC: 31.4 G/DL (ref 33.6–35)
MCV RBC AUTO: 89.6 FL (ref 81.4–97.8)
MCV RBC AUTO: 91.8 FL (ref 81.4–97.8)
MONOCYTES # BLD AUTO: 0.09 K/UL (ref 0–0.85)
MONOCYTES # BLD AUTO: 0.3 K/UL (ref 0–0.85)
MONOCYTES NFR BLD AUTO: 1.6 % (ref 0–13.4)
MONOCYTES NFR BLD AUTO: 5.9 % (ref 0–13.4)
MORPHOLOGY BLD-IMP: NORMAL
NEUTROPHILS # BLD AUTO: 4.11 K/UL (ref 2–7.15)
NEUTROPHILS # BLD AUTO: 5.1 K/UL (ref 2–7.15)
NEUTROPHILS NFR BLD: 81.2 % (ref 44–72)
NEUTROPHILS NFR BLD: 90.2 % (ref 44–72)
NRBC # BLD AUTO: 0 K/UL
NRBC # BLD AUTO: 0 K/UL
NRBC BLD-RTO: 0 /100 WBC
NRBC BLD-RTO: 0 /100 WBC
NT-PROBNP SERPL IA-MCNC: ABNORMAL PG/ML (ref 0–125)
OVALOCYTES BLD QL SMEAR: NORMAL
PLATELET # BLD AUTO: 102 K/UL (ref 164–446)
PLATELET # BLD AUTO: 119 K/UL (ref 164–446)
PLATELET BLD QL SMEAR: NORMAL
PMV BLD AUTO: 11.7 FL (ref 9–12.9)
PMV BLD AUTO: 12.6 FL (ref 9–12.9)
POIKILOCYTOSIS BLD QL SMEAR: NORMAL
POLYCHROMASIA BLD QL SMEAR: NORMAL
POTASSIUM SERPL-SCNC: 4.7 MMOL/L (ref 3.6–5.5)
PRODUCT TYPE UPROD: NORMAL
PRODUCT TYPE UPROD: NORMAL
PROT SERPL-MCNC: 8.5 G/DL (ref 6–8.2)
RBC # BLD AUTO: 2.56 M/UL (ref 4.2–5.4)
RBC # BLD AUTO: 3.45 M/UL (ref 4.2–5.4)
RBC BLD AUTO: PRESENT
RH BLD: NORMAL
SODIUM SERPL-SCNC: 135 MMOL/L (ref 135–145)
TROPONIN T SERPL-MCNC: 669 NG/L (ref 6–19)
UNIT STATUS USTAT: NORMAL
UNIT STATUS USTAT: NORMAL
WBC # BLD AUTO: 5.1 K/UL (ref 4.8–10.8)
WBC # BLD AUTO: 5.7 K/UL (ref 4.8–10.8)

## 2021-11-30 PROCEDURE — 93005 ELECTROCARDIOGRAM TRACING: CPT | Performed by: EMERGENCY MEDICINE

## 2021-11-30 PROCEDURE — 700105 HCHG RX REV CODE 258: Performed by: INTERNAL MEDICINE

## 2021-11-30 PROCEDURE — 86922 COMPATIBILITY TEST ANTIGLOB: CPT

## 2021-11-30 PROCEDURE — 96374 THER/PROPH/DIAG INJ IV PUSH: CPT

## 2021-11-30 PROCEDURE — 700111 HCHG RX REV CODE 636 W/ 250 OVERRIDE (IP): Performed by: EMERGENCY MEDICINE

## 2021-11-30 PROCEDURE — 86900 BLOOD TYPING SEROLOGIC ABO: CPT

## 2021-11-30 PROCEDURE — 96375 TX/PRO/DX INJ NEW DRUG ADDON: CPT

## 2021-11-30 PROCEDURE — 96376 TX/PRO/DX INJ SAME DRUG ADON: CPT

## 2021-11-30 PROCEDURE — 85025 COMPLETE CBC W/AUTO DIFF WBC: CPT

## 2021-11-30 PROCEDURE — 86901 BLOOD TYPING SEROLOGIC RH(D): CPT

## 2021-11-30 PROCEDURE — 36415 COLL VENOUS BLD VENIPUNCTURE: CPT

## 2021-11-30 PROCEDURE — 86945 BLOOD PRODUCT/IRRADIATION: CPT | Mod: 91

## 2021-11-30 PROCEDURE — 80053 COMPREHEN METABOLIC PANEL: CPT

## 2021-11-30 PROCEDURE — 83880 ASSAY OF NATRIURETIC PEPTIDE: CPT

## 2021-11-30 PROCEDURE — 93005 ELECTROCARDIOGRAM TRACING: CPT

## 2021-11-30 PROCEDURE — P9016 RBC LEUKOCYTES REDUCED: HCPCS | Mod: 91

## 2021-11-30 PROCEDURE — 85025 COMPLETE CBC W/AUTO DIFF WBC: CPT | Mod: 91

## 2021-11-30 PROCEDURE — 700111 HCHG RX REV CODE 636 W/ 250 OVERRIDE (IP): Performed by: INTERNAL MEDICINE

## 2021-11-30 PROCEDURE — 99284 EMERGENCY DEPT VISIT MOD MDM: CPT

## 2021-11-30 PROCEDURE — 700102 HCHG RX REV CODE 250 W/ 637 OVERRIDE(OP): Performed by: INTERNAL MEDICINE

## 2021-11-30 PROCEDURE — 700101 HCHG RX REV CODE 250: Performed by: EMERGENCY MEDICINE

## 2021-11-30 PROCEDURE — 71045 X-RAY EXAM CHEST 1 VIEW: CPT

## 2021-11-30 PROCEDURE — A9270 NON-COVERED ITEM OR SERVICE: HCPCS | Performed by: INTERNAL MEDICINE

## 2021-11-30 PROCEDURE — 306780 HCHG STAT FOR TRANSFUSION PER CASE

## 2021-11-30 PROCEDURE — 36430 TRANSFUSION BLD/BLD COMPNT: CPT

## 2021-11-30 PROCEDURE — 86902 BLOOD TYPE ANTIGEN DONOR EA: CPT

## 2021-11-30 PROCEDURE — 84484 ASSAY OF TROPONIN QUANT: CPT

## 2021-11-30 PROCEDURE — 86850 RBC ANTIBODY SCREEN: CPT

## 2021-11-30 RX ORDER — 0.9 % SODIUM CHLORIDE 0.9 %
VIAL (ML) INJECTION PRN
Status: CANCELLED | OUTPATIENT
Start: 2021-11-30

## 2021-11-30 RX ORDER — ACETAMINOPHEN 325 MG/1
650 TABLET ORAL ONCE
Status: CANCELLED | OUTPATIENT
Start: 2021-11-30

## 2021-11-30 RX ORDER — HYDROMORPHONE HYDROCHLORIDE 1 MG/ML
1 INJECTION, SOLUTION INTRAMUSCULAR; INTRAVENOUS; SUBCUTANEOUS ONCE
Status: COMPLETED | OUTPATIENT
Start: 2021-11-30 | End: 2021-11-30

## 2021-11-30 RX ORDER — DIPHENHYDRAMINE HYDROCHLORIDE 50 MG/ML
25 INJECTION INTRAMUSCULAR; INTRAVENOUS PRN
Status: CANCELLED | OUTPATIENT
Start: 2021-11-30

## 2021-11-30 RX ORDER — 0.9 % SODIUM CHLORIDE 0.9 %
3 VIAL (ML) INJECTION PRN
Status: CANCELLED | OUTPATIENT
Start: 2021-11-30

## 2021-11-30 RX ORDER — 0.9 % SODIUM CHLORIDE 0.9 %
10 VIAL (ML) INJECTION PRN
Status: CANCELLED | OUTPATIENT
Start: 2021-11-30

## 2021-11-30 RX ORDER — LABETALOL HYDROCHLORIDE 5 MG/ML
10 INJECTION, SOLUTION INTRAVENOUS ONCE
Status: COMPLETED | OUTPATIENT
Start: 2021-11-30 | End: 2021-11-30

## 2021-11-30 RX ORDER — SODIUM CHLORIDE 9 MG/ML
INJECTION, SOLUTION INTRAVENOUS CONTINUOUS
Status: DISCONTINUED | OUTPATIENT
Start: 2021-11-30 | End: 2021-11-30 | Stop reason: HOSPADM

## 2021-11-30 RX ORDER — ACETAMINOPHEN 325 MG/1
650 TABLET ORAL ONCE
Status: COMPLETED | OUTPATIENT
Start: 2021-11-30 | End: 2021-11-30

## 2021-11-30 RX ORDER — HEPARIN SODIUM (PORCINE) LOCK FLUSH IV SOLN 100 UNIT/ML 100 UNIT/ML
500 SOLUTION INTRAVENOUS PRN
Status: CANCELLED | OUTPATIENT
Start: 2021-11-30

## 2021-11-30 RX ORDER — DIPHENHYDRAMINE HYDROCHLORIDE 50 MG/ML
25 INJECTION INTRAMUSCULAR; INTRAVENOUS PRN
Status: COMPLETED | OUTPATIENT
Start: 2021-11-30 | End: 2021-11-30

## 2021-11-30 RX ORDER — SODIUM CHLORIDE 9 MG/ML
INJECTION, SOLUTION INTRAVENOUS CONTINUOUS
Status: CANCELLED | OUTPATIENT
Start: 2021-11-30

## 2021-11-30 RX ORDER — DIPHENHYDRAMINE HCL 25 MG
25 TABLET ORAL ONCE
Status: CANCELLED | OUTPATIENT
Start: 2021-11-30 | End: 2021-11-30

## 2021-11-30 RX ORDER — HYDROMORPHONE HYDROCHLORIDE 1 MG/ML
0.5 INJECTION, SOLUTION INTRAMUSCULAR; INTRAVENOUS; SUBCUTANEOUS ONCE
Status: COMPLETED | OUTPATIENT
Start: 2021-11-30 | End: 2021-11-30

## 2021-11-30 RX ORDER — DIPHENHYDRAMINE HYDROCHLORIDE 50 MG/ML
25 INJECTION INTRAMUSCULAR; INTRAVENOUS ONCE
Status: COMPLETED | OUTPATIENT
Start: 2021-11-30 | End: 2021-11-30

## 2021-11-30 RX ORDER — ACETAMINOPHEN 325 MG/1
650 TABLET ORAL PRN
Status: CANCELLED | OUTPATIENT
Start: 2021-11-30

## 2021-11-30 RX ORDER — DIPHENHYDRAMINE HCL 25 MG
25 TABLET ORAL ONCE
Status: DISCONTINUED | OUTPATIENT
Start: 2021-11-30 | End: 2021-11-30

## 2021-11-30 RX ADMIN — DIPHENHYDRAMINE HYDROCHLORIDE 25 MG: 50 INJECTION INTRAMUSCULAR; INTRAVENOUS at 12:33

## 2021-11-30 RX ADMIN — ACETAMINOPHEN 650 MG: 325 TABLET ORAL at 12:33

## 2021-11-30 RX ADMIN — DIPHENHYDRAMINE HYDROCHLORIDE 25 MG: 50 INJECTION INTRAMUSCULAR; INTRAVENOUS at 16:44

## 2021-11-30 RX ADMIN — SODIUM CHLORIDE: 9 INJECTION, SOLUTION INTRAVENOUS at 18:00

## 2021-11-30 RX ADMIN — LABETALOL HYDROCHLORIDE 10 MG: 5 INJECTION, SOLUTION INTRAVENOUS at 22:38

## 2021-11-30 RX ADMIN — HYDROMORPHONE HYDROCHLORIDE 1 MG: 1 INJECTION, SOLUTION INTRAMUSCULAR; INTRAVENOUS; SUBCUTANEOUS at 22:19

## 2021-11-30 RX ADMIN — HYDROCORTISONE SODIUM SUCCINATE 100 MG: 100 INJECTION, POWDER, FOR SOLUTION INTRAMUSCULAR; INTRAVENOUS at 16:42

## 2021-11-30 RX ADMIN — HYDROMORPHONE HYDROCHLORIDE 0.5 MG: 1 INJECTION, SOLUTION INTRAMUSCULAR; INTRAVENOUS; SUBCUTANEOUS at 20:04

## 2021-11-30 ASSESSMENT — LIFESTYLE VARIABLES
DO YOU DRINK ALCOHOL: NO
DOES PATIENT WANT TO STOP DRINKING: NO
EVER FELT BAD OR GUILTY ABOUT YOUR DRINKING: NO
TOTAL SCORE: 0
EVER HAD A DRINK FIRST THING IN THE MORNING TO STEADY YOUR NERVES TO GET RID OF A HANGOVER: NO
AVERAGE NUMBER OF DAYS PER WEEK YOU HAVE A DRINK CONTAINING ALCOHOL: 0
ON A TYPICAL DAY WHEN YOU DRINK ALCOHOL HOW MANY DRINKS DO YOU HAVE: 0
CONSUMPTION TOTAL: NEGATIVE
HAVE YOU EVER FELT YOU SHOULD CUT DOWN ON YOUR DRINKING: NO
HOW MANY TIMES IN THE PAST YEAR HAVE YOU HAD 5 OR MORE DRINKS IN A DAY: 0
TOTAL SCORE: 0
HAVE PEOPLE ANNOYED YOU BY CRITICIZING YOUR DRINKING: NO
TOTAL SCORE: 0

## 2021-11-30 ASSESSMENT — FIBROSIS 4 INDEX
FIB4 SCORE: 0.88
FIB4 SCORE: 0.91

## 2021-12-01 ENCOUNTER — APPOINTMENT (OUTPATIENT)
Dept: ONCOLOGY | Facility: MEDICAL CENTER | Age: 26
End: 2021-12-01
Attending: INTERNAL MEDICINE
Payer: COMMERCIAL

## 2021-12-01 NOTE — PROGRESS NOTES
"Lily arrives ambulatory to [facility] for blood transfusion.  Lily reports feeling slight chest discomfort, arrived on home O2 at 3L [place], reports using O2 at home as needed.  Lily reports having hemodialysis yesterday.  PIV placed to left FA, brisk blood return observed, COD and CBC collected.  Hgb 6.9, parameters are met for pt to receive 2 units PRBC.  This RN discussed with Lily if she preferred 1 unit or 2 units PRBC today.  Lily verbalized she has tolerated 2 units many times in the past and opted for the 2 units today.      Pre-medications administered per MAR.  1 unit PRBC administered, Lily tolerated well, no s/s adverse reaction observed or verbalized.  2nd unit started at 1538.  At 1640, Lily reports feeling \"itchy\".  Transfusion stopped, pt medicated with benadryl and solucortef per MD order.  Lliy reports feeling much better approximately 10 minutes after receiving these medications.  Transfusion restarted, Lily voiced no complaints until 1755, she reported not feeling well and having chest pain, HA and difficulty breathing.      Transfusion stopped at 1755, /117, pulse 89, Temp 97.0f temporal.  O2 sat 100% on 3L o2/NC.  RR 18.  MD notified ( Dr Marvin FERRO on call), pt transferred to ER via wheelchair on 3L O2/NC.      Report given to triage RN, Triage RN assumed care.                 "

## 2021-12-01 NOTE — ED TRIAGE NOTES
"Chief Complaint   Patient presents with   • Other     Pt was at [facility] receiving blood transfusion when she started having SOB, itchiness and chest pain.          Pt wheeled to triage for above complaint.    Per transfusion services pt was was given medications to help with possible transfusion reaction during the start of the second bag of PRBC. Symptoms went away.     Pt is AO x 4, follows commands, and responds appropriately to questions. Patient's breathing is unlabored and pain is currently 10/10 on the 0-10 pain scale.  Pt placed in lobby. Patient educated on triage process and encouraged to alert staff for any changes.    BP (!) 181/128   Pulse 92   Temp (!) 35.7 °C (96.3 °F) (Temporal)   Resp 14   Ht 1.702 m (5' 7\")   Wt 53.7 kg (118 lb 6.2 oz)   SpO2 100%     "

## 2021-12-01 NOTE — ED PROVIDER NOTES
ED Provider Note    ER PROVIDER NOTE        CHIEF COMPLAINT  Chief Complaint   Patient presents with   • Other     Pt was at [facility] receiving blood transfusion when she started having SOB, itchiness and chest pain.        HPI  Lily Nicole is a 26 y.o. female who presents to the emergency department complaining of back pain, and chest pain as well as itchiness.  Patient was at transfusion services receiving 2 units, this is not uncommon for her due to some chronic anemia with her kidney disease.  In the second unit she began to have some itching, she was given Benadryl and Solu-Medrol, and transfusion continued and towards the end she began to have some return of the itching as well as chest pain and lower back pain.  She also reports some shortness of breath.  Pain seems to be more sharp and achy and is difficult to localize no real alleviating or aggravating factors.  No recent illness fevers chills cough or congestion.  No new abnormal swelling.  No headaches, nausea or vomiting.  States her breathing feels fine now.    Dialysis Monday Wednesday Friday    REVIEW OF SYSTEMS  Pertinent positives include itchiness, chest pain. Pertinent negatives include no fever or chills. See HPI for details. All other systems reviewed and are negative.    PAST MEDICAL HISTORY   has a past medical history of Anemia (01/17/2018), AVF (arteriovenous fistula) ([facility]), Chest pain, Chest tightness, Daytime sleepiness, Dialysis patient ([facility]), Difficulty breathing, ESRD (end stage renal disease) on dialysis ([facility]) (01/17/2018), Gasping for breath, Heart burn, Hypertension (01/17/2018), Indigestion, Lupus ([facility]), Migraines (01/17/2018), Painful breathing, Palpitations, Seizure ([facility]) (2013), Shortness of breath, Swelling of lower extremity, and Wheezing.    SURGICAL HISTORY   has a past surgical history that includes ronak by laparoscopy (4/5/2010); av fistula creation (Right); angioplasty (01/17/2018); other;  other; gastroscopy-endo (12/9/2019); gastroscopy (N/A, 5/30/2020); gastroscopy-endo (9/18/2020); upper gi endoscopy,ctrl bleed (11/12/2020); upper gi endoscopy,biopsy (11/12/2020); gastroscopy-endo (11/12/2020); colonoscopy,diagnostic (1/8/2021); upper gi endoscopy,diagnosis (1/8/2021); upper gi endoscopy,ctrl bleed (1/8/2021); upper gi endoscopy,diagnosis (3/5/2021); upper gi endoscopy,diagnosis (N/A, 3/19/2021); upper gi endoscopy,ctrl bleed (3/19/2021); and bronchoscopy,diagnostic (Bilateral, 5/13/2021).    FAMILY HISTORY  Family History   Problem Relation Age of Onset   • Diabetes Paternal Grandmother        SOCIAL HISTORY  Social History     Socioeconomic History   • Marital status: Single     Spouse name: Not on file   • Number of children: Not on file   • Years of education: Not on file   • Highest education level: Not on file   Occupational History   • Not on file   Tobacco Use   • Smoking status: Never Smoker   • Smokeless tobacco: Never Used   Vaping Use   • Vaping Use: Never used   Substance and Sexual Activity   • Alcohol use: No   • Drug use: No   • Sexual activity: Not on file   Other Topics Concern   • Not on file   Social History Narrative   • Not on file     Social Determinants of Health     Financial Resource Strain: Low Risk    • Difficulty of Paying Living Expenses: Not hard at all   Food Insecurity: No Food Insecurity   • Worried About Running Out of Food in the Last Year: Never true   • Ran Out of Food in the Last Year: Never true   Transportation Needs: No Transportation Needs   • Lack of Transportation (Medical): No   • Lack of Transportation (Non-Medical): No   Physical Activity:    • Days of Exercise per Week: Not on file   • Minutes of Exercise per Session: Not on file   Stress:    • Feeling of Stress : Not on file   Social Connections:    • Frequency of Communication with Friends and Family: Not on file   • Frequency of Social Gatherings with Friends and Family: Not on file   • Attends  "[employer] Services: Not on file   • Active Member of Clubs or Organizations: Not on file   • Attends Club or Organization Meetings: Not on file   • Marital Status: Not on file   Intimate Partner Violence:    • Fear of Current or Ex-Partner: Not on file   • Emotionally Abused: Not on file   • Physically Abused: Not on file   • Sexually Abused: Not on file   Housing Stability:    • Unable to Pay for Housing in the Last Year: Not on file   • Number of Places Lived in the Last Year: Not on file   • Unstable Housing in the Last Year: Not on file      Social History     Substance and Sexual Activity   Drug Use No       CURRENT MEDICATIONS  Home Medications     Reviewed by Stacy Walker R.N. (Registered Nurse) on 11/30/21 at 1849  Med List Status: Partial   Medication Last Dose Status   acetaminophen (TYLENOL) 500 MG Tab  Active   amLODIPine (NORVASC) 5 MG Tab  Active   atenolol (TENORMIN) 25 MG Tab  Active   cloNIDine (CATAPRES) 0.2 MG/24HR PATCH WEEKLY patch  Active   hydroxychloroquine (PLAQUENIL) 200 MG Tab  Active   mycophenolate sodium (MYFORTIC) 360 MG Tablet Delayed Response tablet  Active   ondansetron (ZOFRAN ODT) 4 MG TABLET DISPERSIBLE  Active   oxyCODONE immediate-release (ROXICODONE) 5 MG Tab  Active   pantoprazole (PROTONIX) 40 MG Tablet Delayed Response  Active   predniSONE (DELTASONE) 5 MG Tab  Active                ALLERGIES  Allergies   Allergen Reactions   • Cephalexin Rash     Nausea and rash    • Clindamycin Rash     Nausea and rash      • Methylprednisolone Unspecified     Anxious   • Metoprolol Rash     Nausea and rash      • Fentanyl And Related Anxiety   • Maxipime [Cefepime] Itching   • Norco [Hydrocodone-Acetaminophen] Rash and Nausea     Generalized rash   • Reglan [Metoclopramide] Anxiety   • Tape        PHYSICAL EXAM  VITAL SIGNS: BP (!) 192/112   Pulse 95   Temp (!) 35.7 °C (96.3 °F) (Temporal)   Resp 20   Ht 1.702 m (5' 7\")   Wt 53.7 kg (118 lb 6.2 oz)   LMP 11/05/2021 (Exact " Date) Comment: 2 days ago  SpO2 97%   BMI 18.54 kg/m²   Pulse ox interpretation: I interpret this pulse ox as normal.    Constitutional: Alert, chronically ill-appearing  HENT: No signs of trauma, Bilateral external ears normal, Nose normal.   Eyes: Pupils are equal and reactive, Conjunctiva normal, Non-icteric.   Neck: Normal range of motion, No tenderness, Supple, No stridor.   Lymphatic: No lymphadenopathy noted.   Cardiovascular: Regular rate and rhythm, no murmurs.   Thorax & Lungs: Normal breath sounds, No respiratory distress, No wheezing, No chest tenderness.   Abdomen: Bowel sounds normal, Soft, No tenderness, No masses, No pulsatile masses. No peritoneal signs.  Skin: Warm, Dry, No erythema, No rash.   Back: No bony tenderness, No CVA tenderness.   Extremities: Fistula to the right upper extremity intact distal pulses, No edema, No tenderness, No cyanosis,Negative Kirti's sign.  Musculoskeletal: Good range of motion in all major joints. No tenderness to palpation or major deformities noted.   Neurologic: Alert , Normal motor function, Normal sensory function, No focal deficits noted.   Psychiatric: Affect normal, Judgment normal, Mood normal.     DIAGNOSTIC STUDIES / PROCEDURES    Results for orders placed or performed during the hospital encounter of 11/30/21   CBC WITH DIFFERENTIAL   Result Value Ref Range    WBC 5.7 4.8 - 10.8 K/uL    RBC 3.45 (L) 4.20 - 5.40 M/uL    Hemoglobin 9.7 (L) 12.0 - 16.0 g/dL    Hematocrit 30.9 (L) 37.0 - 47.0 %    MCV 89.6 81.4 - 97.8 fL    MCH 28.1 27.0 - 33.0 pg    MCHC 31.4 (L) 33.6 - 35.0 g/dL    RDW 53.6 (H) 35.9 - 50.0 fL    Platelet Count 102 (L) 164 - 446 K/uL    MPV 11.7 9.0 - 12.9 fL    Neutrophils-Polys 90.20 (H) 44.00 - 72.00 %    Lymphocytes 6.40 (L) 22.00 - 41.00 %    Monocytes 1.60 0.00 - 13.40 %    Eosinophils 0.20 0.00 - 6.90 %    Basophils 0.40 0.00 - 1.80 %    Immature Granulocytes 1.20 (H) 0.00 - 0.90 %    Nucleated RBC 0.00 /100 WBC    Neutrophils  (Absolute) 5.10 2.00 - 7.15 K/uL    Lymphs (Absolute) 0.36 (L) 1.00 - 4.80 K/uL    Monos (Absolute) 0.09 0.00 - 0.85 K/uL    Eos (Absolute) 0.01 0.00 - 0.51 K/uL    Baso (Absolute) 0.02 0.00 - 0.12 K/uL    Immature Granulocytes (abs) 0.07 0.00 - 0.11 K/uL    NRBC (Absolute) 0.00 K/uL   Comp Metabolic Panel   Result Value Ref Range    Sodium 135 135 - 145 mmol/L    Potassium 4.7 3.6 - 5.5 mmol/L    Chloride 91 (L) 96 - 112 mmol/L    Co2 32 20 - 33 mmol/L    Anion Gap 12.0 7.0 - 16.0    Glucose 113 (H) 65 - 99 mg/dL    Bun 48 (H) 8 - 22 mg/dL    Creatinine 4.49 (H) 0.50 - 1.40 mg/dL    Calcium 9.3 8.5 - 10.5 mg/dL    AST(SGOT) 46 (H) 12 - 45 U/L    ALT(SGPT) 70 (H) 2 - 50 U/L    Alkaline Phosphatase 190 (H) 30 - 99 U/L    Total Bilirubin 0.5 0.1 - 1.5 mg/dL    Albumin 3.6 3.2 - 4.9 g/dL    Total Protein 8.5 (H) 6.0 - 8.2 g/dL    Globulin 4.9 (H) 1.9 - 3.5 g/dL    A-G Ratio 0.7 g/dL   TROPONIN   Result Value Ref Range    Troponin T 669 (H) 6 - 19 ng/L   proBrain Natriuretic Peptide, NT   Result Value Ref Range    NT-proBNP >17462 (H) 0 - 125 pg/mL   ESTIMATED GFR   Result Value Ref Range    GFR If  15 (A) >60 mL/min/1.73 m 2    GFR If Non  12 (A) >60 mL/min/1.73 m 2   EKG (NOW)   Result Value Ref Range    Report       [facility] Emergency [facility]    Test Date:  2021  Pt Name:    CASE WOODSON            Department: ER  MRN:        [id number]                      Room:  Gender:     Female                       Technician: SEBASTIENR  :        1995                   Requested By:ER TRIAGE PROTOCOL  Order #:    [id number]                    Reading MD: SHERIF SILVERMAN MD    Measurements  Intervals                                Axis  Rate:       94                           P:          70  HI:         164                          QRS:        -16  QRSD:       80                           T:          104  QT:         356  QTc:        446    Interpretive  Statements  SINUS RHYTHM  PROBABLE LVH WITH SECONDARY REPOL ABNRM  BASELINE WANDER IN LEAD(S) III,aVL,aVF  Compared to ECG 11/28/2021 20:30:28  Sinus tachycardia no longer present  Electronically Signed On 11- 20:32:48 PST by SHERIF SILVERMAN MD           RADIOLOGY  DX-CHEST-PORTABLE (1 VIEW)   Final Result      Cardiomegaly.        The radiologist's interpretation of all radiological studies have been reviewed and images independently viewed by me.    COURSE & MEDICAL DECISION MAKING  Nursing notes, VS, PMSFHx reviewed in chart.    8:00 PM Patient seen and examined at bedside. Patient will be treated with hydromorphone. Ordered for cbc, cmp, troponin, ecg, cxr to evaluate her symptoms.     I reviewed the patient records, patient was admitted to the hospital and discharged on 1128 for chest pain, unremarkable ECG.  She does have an elevated troponin although she is at her baseline levels for this, she had echo performed as well without any pericardial effusion.  Negative ultrasounds for DVT    10:15 PM  Patient is reevaluated, updated on all results, still pending metabolic panel, reports pain is improved but still present, additional pain medication ordered    11:55 PM  Patient is reevaluated, she is much more comfortable. discussed results and plan for discharge to which the patient is agreeable          Decision Making:  This is a 26 y.o. female presented with chest pain and back pain after receiving a transfusion.  At this point does not appear to be dangerous transfusion reaction.  She has no respiratory distress, no pulmonary edema, is on her baseline oxygen.  While she does have several laboratory abnormalities including troponin and BNP, these are baseline for her patient does have chronic chest pain has been seen multiple times for this, potential pleuritis or chronic pericarditis, she had recent admission for this pain without any acute abnormalities such as cardiac ischemia, pericardial effusion or  venous thromboembolic disease.  She does not appear volume overloaded at this time and I feel she is appropriate to continue with her outpatient dialysis.     The patient will return for new or worsening symptoms and is stable at the time of discharge.    The patient is referred to a primary physician for blood pressure management, diabetic screening, and for all other preventative health concerns.        DISPOSITION:  Patient will be discharged home in stable condition.    FOLLOW UP:  Ella Matthew M.D.  [address] [place] [id number]  [phone]    Schedule an appointment as soon as possible for a visit         OUTPATIENT MEDICATIONS:  New Prescriptions    No medications on file         FINAL IMPRESSION  1. Chest pain, unspecified type    2. Stage 5 chronic kidney disease on chronic dialysis (HCC)    3. Anemia, unspecified type           The note accurately reflects work and decisions made by me.  Mauro Spencer M.D.  12/1/2021  3:25 AM

## 2021-12-01 NOTE — DISCHARGE INSTRUCTIONS
Your test today show no dangerous cause for your symptoms or dangerous reaction to the transfusion.  Please continue with your dialysis tomorrow and follow-up with your primary care doctor as above

## 2021-12-02 LAB
BLD GP AB INVEST PLASRBC-IMP: ABNORMAL
BLD GP AB INVEST PLASRBC-IMP: ABNORMAL

## 2021-12-07 LAB
PATHOLOGIST INTERPRETATION PTRX: NORMAL
STAT TRANS INVEST 8506STI: NORMAL

## 2021-12-10 ENCOUNTER — TELEPHONE (OUTPATIENT)
Dept: ONCOLOGY | Facility: MEDICAL CENTER | Age: 26
End: 2021-12-10

## 2021-12-10 ENCOUNTER — APPOINTMENT (OUTPATIENT)
Dept: ONCOLOGY | Facility: MEDICAL CENTER | Age: 26
End: 2021-12-10
Attending: INTERNAL MEDICINE
Payer: COMMERCIAL

## 2021-12-10 NOTE — TELEPHONE ENCOUNTER
Lily states that she is trying to reschedule appointments, provided Lily with phone number to contact scheduling team to adjust schedule. Lily will not be coming to her appointment for today.

## 2021-12-12 ENCOUNTER — APPOINTMENT (OUTPATIENT)
Dept: ONCOLOGY | Facility: MEDICAL CENTER | Age: 26
End: 2021-12-12
Attending: INTERNAL MEDICINE
Payer: COMMERCIAL

## 2021-12-17 NOTE — ASSESSMENT & PLAN NOTE
CXR demonstrates pulmoary edema  On 4L chronically   Lower abd pain and bloating x10 days. Denies f/n/v/d. Last BM today.

## 2021-12-20 ENCOUNTER — APPOINTMENT (OUTPATIENT)
Dept: RADIOLOGY | Facility: MEDICAL CENTER | Age: 26
DRG: 377 | End: 2021-12-20
Attending: EMERGENCY MEDICINE
Payer: COMMERCIAL

## 2021-12-20 ENCOUNTER — HOSPITAL ENCOUNTER (INPATIENT)
Facility: MEDICAL CENTER | Age: 26
LOS: 1 days | DRG: 377 | End: 2021-12-21
Attending: EMERGENCY MEDICINE | Admitting: INTERNAL MEDICINE
Payer: COMMERCIAL

## 2021-12-20 DIAGNOSIS — R10.84 GENERALIZED ABDOMINAL PAIN: ICD-10-CM

## 2021-12-20 DIAGNOSIS — Z99.2 CHRONIC KIDNEY DISEASE ON CHRONIC DIALYSIS (HCC): ICD-10-CM

## 2021-12-20 DIAGNOSIS — K59.00 CONSTIPATION, UNSPECIFIED CONSTIPATION TYPE: ICD-10-CM

## 2021-12-20 DIAGNOSIS — N18.6 CHRONIC KIDNEY DISEASE ON CHRONIC DIALYSIS (HCC): ICD-10-CM

## 2021-12-20 DIAGNOSIS — D64.9 SYMPTOMATIC ANEMIA: ICD-10-CM

## 2021-12-20 DIAGNOSIS — I15.1 HYPERTENSION SECONDARY TO OTHER RENAL DISORDERS: ICD-10-CM

## 2021-12-20 PROBLEM — R10.9 AP (ABDOMINAL PAIN): Status: ACTIVE | Noted: 2021-12-20

## 2021-12-20 LAB
ABO GROUP BLD: NORMAL
ALBUMIN SERPL BCP-MCNC: 2.9 G/DL (ref 3.2–4.9)
ALBUMIN/GLOB SERPL: 0.5 G/DL
ALP SERPL-CCNC: 94 U/L (ref 30–99)
ALT SERPL-CCNC: 12 U/L (ref 2–50)
ANION GAP SERPL CALC-SCNC: 16 MMOL/L (ref 7–16)
APTT PPP: 33.3 SEC (ref 24.7–36)
AST SERPL-CCNC: 25 U/L (ref 12–45)
BARCODED ABORH UBTYP: 5100
BARCODED PRD CODE UBPRD: NORMAL
BARCODED UNIT NUM UBUNT: NORMAL
BASOPHILS # BLD AUTO: 0.3 % (ref 0–1.8)
BASOPHILS # BLD: 0.01 K/UL (ref 0–0.12)
BILIRUB SERPL-MCNC: 0.3 MG/DL (ref 0.1–1.5)
BLD GP AB SCN SERPL QL: NORMAL
BUN SERPL-MCNC: 58 MG/DL (ref 8–22)
CALCIUM SERPL-MCNC: 8.6 MG/DL (ref 8.4–10.2)
CHLORIDE SERPL-SCNC: 89 MMOL/L (ref 96–112)
CO2 SERPL-SCNC: 26 MMOL/L (ref 20–33)
COMMENT 1642: NORMAL
COMPONENT R 8504R: NORMAL
CREAT SERPL-MCNC: 7.5 MG/DL (ref 0.5–1.4)
EKG IMPRESSION: NORMAL
EOSINOPHIL # BLD AUTO: 0.05 K/UL (ref 0–0.51)
EOSINOPHIL NFR BLD: 1.4 % (ref 0–6.9)
ERYTHROCYTE [DISTWIDTH] IN BLOOD BY AUTOMATED COUNT: 61.6 FL (ref 35.9–50)
FERRITIN SERPL-MCNC: 956 NG/ML (ref 10–291)
FOLATE SERPL-MCNC: 4.5 NG/ML
GLOBULIN SER CALC-MCNC: 5.4 G/DL (ref 1.9–3.5)
GLUCOSE SERPL-MCNC: 97 MG/DL (ref 65–99)
HCT VFR BLD AUTO: 20.8 % (ref 37–47)
HGB BLD-MCNC: 5.5 G/DL (ref 12–16)
HGB BLD-MCNC: 6.1 G/DL (ref 12–16)
HGB BLD-MCNC: 8.6 G/DL (ref 12–16)
HGB RETIC QN AUTO: 22.7 PG/CELL (ref 29–35)
IMM GRANULOCYTES # BLD AUTO: 0.01 K/UL (ref 0–0.11)
IMM GRANULOCYTES NFR BLD AUTO: 0.3 % (ref 0–0.9)
IMM RETICS NFR: 17.7 % (ref 9.3–17.4)
INR PPP: 1.26 (ref 0.87–1.13)
IRON SATN MFR SERPL: 18 % (ref 15–55)
IRON SERPL-MCNC: 24 UG/DL (ref 40–170)
LIPASE SERPL-CCNC: 24 U/L (ref 7–58)
LYMPHOCYTES # BLD AUTO: 0.48 K/UL (ref 1–4.8)
LYMPHOCYTES NFR BLD: 13.6 % (ref 22–41)
MCH RBC QN AUTO: 26.9 PG (ref 27–33)
MCHC RBC AUTO-ENTMCNC: 29.3 G/DL (ref 33.6–35)
MCV RBC AUTO: 91.6 FL (ref 81.4–97.8)
MONOCYTES # BLD AUTO: 0.13 K/UL (ref 0–0.85)
MONOCYTES NFR BLD AUTO: 3.7 % (ref 0–13.4)
NEUTROPHILS # BLD AUTO: 2.85 K/UL (ref 2–7.15)
NEUTROPHILS NFR BLD: 80.7 % (ref 44–72)
NRBC # BLD AUTO: 0 K/UL
NRBC BLD-RTO: 0 /100 WBC
PLATELET # BLD AUTO: 95 K/UL (ref 164–446)
PMV BLD AUTO: 10.9 FL (ref 9–12.9)
POTASSIUM SERPL-SCNC: 5.1 MMOL/L (ref 3.6–5.5)
PRODUCT TYPE UPROD: NORMAL
PROT SERPL-MCNC: 8.3 G/DL (ref 6–8.2)
PROTHROMBIN TIME: 14.8 SEC (ref 12–14.6)
RBC # BLD AUTO: 2.27 M/UL (ref 4.2–5.4)
RETICS # AUTO: 0.04 M/UL (ref 0.04–0.06)
RETICS/RBC NFR: 2.1 % (ref 0.8–2.1)
RH BLD: NORMAL
SODIUM SERPL-SCNC: 131 MMOL/L (ref 135–145)
TIBC SERPL-MCNC: 135 UG/DL (ref 250–450)
UIBC SERPL-MCNC: 111 UG/DL (ref 110–370)
UNIT STATUS USTAT: NORMAL
VIT B12 SERPL-MCNC: 576 PG/ML (ref 211–911)
WBC # BLD AUTO: 3.5 K/UL (ref 4.8–10.8)

## 2021-12-20 PROCEDURE — 85025 COMPLETE CBC W/AUTO DIFF WBC: CPT

## 2021-12-20 PROCEDURE — A9270 NON-COVERED ITEM OR SERVICE: HCPCS

## 2021-12-20 PROCEDURE — 700102 HCHG RX REV CODE 250 W/ 637 OVERRIDE(OP): Performed by: EMERGENCY MEDICINE

## 2021-12-20 PROCEDURE — 83540 ASSAY OF IRON: CPT

## 2021-12-20 PROCEDURE — 99254 IP/OBS CNSLTJ NEW/EST MOD 60: CPT | Performed by: INTERNAL MEDICINE

## 2021-12-20 PROCEDURE — 700111 HCHG RX REV CODE 636 W/ 250 OVERRIDE (IP)

## 2021-12-20 PROCEDURE — A9270 NON-COVERED ITEM OR SERVICE: HCPCS | Performed by: INTERNAL MEDICINE

## 2021-12-20 PROCEDURE — 83550 IRON BINDING TEST: CPT

## 2021-12-20 PROCEDURE — 86945 BLOOD PRODUCT/IRRADIATION: CPT

## 2021-12-20 PROCEDURE — 85730 THROMBOPLASTIN TIME PARTIAL: CPT

## 2021-12-20 PROCEDURE — 86922 COMPATIBILITY TEST ANTIGLOB: CPT | Mod: 91

## 2021-12-20 PROCEDURE — 82728 ASSAY OF FERRITIN: CPT

## 2021-12-20 PROCEDURE — 30233N1 TRANSFUSION OF NONAUTOLOGOUS RED BLOOD CELLS INTO PERIPHERAL VEIN, PERCUTANEOUS APPROACH: ICD-10-PCS | Performed by: EMERGENCY MEDICINE

## 2021-12-20 PROCEDURE — 86900 BLOOD TYPING SEROLOGIC ABO: CPT

## 2021-12-20 PROCEDURE — 700102 HCHG RX REV CODE 250 W/ 637 OVERRIDE(OP): Performed by: INTERNAL MEDICINE

## 2021-12-20 PROCEDURE — 82746 ASSAY OF FOLIC ACID SERUM: CPT

## 2021-12-20 PROCEDURE — 96372 THER/PROPH/DIAG INJ SC/IM: CPT

## 2021-12-20 PROCEDURE — C9113 INJ PANTOPRAZOLE SODIUM, VIA: HCPCS

## 2021-12-20 PROCEDURE — 85610 PROTHROMBIN TIME: CPT

## 2021-12-20 PROCEDURE — 86902 BLOOD TYPE ANTIGEN DONOR EA: CPT

## 2021-12-20 PROCEDURE — 96375 TX/PRO/DX INJ NEW DRUG ADDON: CPT

## 2021-12-20 PROCEDURE — 86901 BLOOD TYPING SEROLOGIC RH(D): CPT

## 2021-12-20 PROCEDURE — 90935 HEMODIALYSIS ONE EVALUATION: CPT

## 2021-12-20 PROCEDURE — 770020 HCHG ROOM/CARE - TELE (206)

## 2021-12-20 PROCEDURE — A9270 NON-COVERED ITEM OR SERVICE: HCPCS | Performed by: EMERGENCY MEDICINE

## 2021-12-20 PROCEDURE — 700102 HCHG RX REV CODE 250 W/ 637 OVERRIDE(OP)

## 2021-12-20 PROCEDURE — 36415 COLL VENOUS BLD VENIPUNCTURE: CPT

## 2021-12-20 PROCEDURE — 700111 HCHG RX REV CODE 636 W/ 250 OVERRIDE (IP): Performed by: EMERGENCY MEDICINE

## 2021-12-20 PROCEDURE — 93005 ELECTROCARDIOGRAM TRACING: CPT | Performed by: INTERNAL MEDICINE

## 2021-12-20 PROCEDURE — 85046 RETICYTE/HGB CONCENTRATE: CPT

## 2021-12-20 PROCEDURE — P9016 RBC LEUKOCYTES REDUCED: HCPCS | Mod: 91

## 2021-12-20 PROCEDURE — 74018 RADEX ABDOMEN 1 VIEW: CPT

## 2021-12-20 PROCEDURE — 96374 THER/PROPH/DIAG INJ IV PUSH: CPT

## 2021-12-20 PROCEDURE — 82607 VITAMIN B-12: CPT

## 2021-12-20 PROCEDURE — 36430 TRANSFUSION BLD/BLD COMPNT: CPT

## 2021-12-20 PROCEDURE — 83690 ASSAY OF LIPASE: CPT

## 2021-12-20 PROCEDURE — 700111 HCHG RX REV CODE 636 W/ 250 OVERRIDE (IP): Performed by: INTERNAL MEDICINE

## 2021-12-20 PROCEDURE — 99233 SBSQ HOSP IP/OBS HIGH 50: CPT | Performed by: INTERNAL MEDICINE

## 2021-12-20 PROCEDURE — 80053 COMPREHEN METABOLIC PANEL: CPT

## 2021-12-20 PROCEDURE — 96376 TX/PRO/DX INJ SAME DRUG ADON: CPT

## 2021-12-20 PROCEDURE — 85018 HEMOGLOBIN: CPT

## 2021-12-20 PROCEDURE — 86850 RBC ANTIBODY SCREEN: CPT

## 2021-12-20 PROCEDURE — 99285 EMERGENCY DEPT VISIT HI MDM: CPT

## 2021-12-20 PROCEDURE — 99220 PR INITIAL OBSERVATION CARE,LEVL III: CPT | Performed by: INTERNAL MEDICINE

## 2021-12-20 RX ORDER — PROMETHAZINE HYDROCHLORIDE 25 MG/1
12.5-25 TABLET ORAL EVERY 4 HOURS PRN
Status: DISCONTINUED | OUTPATIENT
Start: 2021-12-20 | End: 2021-12-21 | Stop reason: HOSPADM

## 2021-12-20 RX ORDER — OMEPRAZOLE 20 MG/1
20 CAPSULE, DELAYED RELEASE ORAL 2 TIMES DAILY
Status: DISCONTINUED | OUTPATIENT
Start: 2021-12-20 | End: 2021-12-21 | Stop reason: HOSPADM

## 2021-12-20 RX ORDER — CLONIDINE 0.2 MG/24H
1 PATCH, EXTENDED RELEASE TRANSDERMAL
Status: DISCONTINUED | OUTPATIENT
Start: 2021-12-20 | End: 2021-12-21 | Stop reason: HOSPADM

## 2021-12-20 RX ORDER — PANTOPRAZOLE SODIUM 40 MG/10ML
INJECTION, POWDER, LYOPHILIZED, FOR SOLUTION INTRAVENOUS
Status: COMPLETED
Start: 2021-12-20 | End: 2021-12-20

## 2021-12-20 RX ORDER — BISACODYL 5 MG
10 TABLET, DELAYED RELEASE (ENTERIC COATED) ORAL ONCE
Status: COMPLETED | OUTPATIENT
Start: 2021-12-20 | End: 2021-12-20

## 2021-12-20 RX ORDER — MYCOPHENOLATE MOFETIL 250 MG/1
500 CAPSULE ORAL EVERY EVENING
Status: DISCONTINUED | OUTPATIENT
Start: 2021-12-20 | End: 2021-12-21 | Stop reason: HOSPADM

## 2021-12-20 RX ORDER — PROCHLORPERAZINE EDISYLATE 5 MG/ML
5-10 INJECTION INTRAMUSCULAR; INTRAVENOUS EVERY 4 HOURS PRN
Status: DISCONTINUED | OUTPATIENT
Start: 2021-12-20 | End: 2021-12-21 | Stop reason: HOSPADM

## 2021-12-20 RX ORDER — ACETAMINOPHEN 325 MG/1
650 TABLET ORAL
Status: COMPLETED | OUTPATIENT
Start: 2021-12-20 | End: 2021-12-20

## 2021-12-20 RX ORDER — HYDROMORPHONE HYDROCHLORIDE 1 MG/ML
0.25 INJECTION, SOLUTION INTRAMUSCULAR; INTRAVENOUS; SUBCUTANEOUS
Status: DISCONTINUED | OUTPATIENT
Start: 2021-12-20 | End: 2021-12-21 | Stop reason: HOSPADM

## 2021-12-20 RX ORDER — ONDANSETRON 4 MG/1
4 TABLET, ORALLY DISINTEGRATING ORAL EVERY 4 HOURS PRN
Status: DISCONTINUED | OUTPATIENT
Start: 2021-12-20 | End: 2021-12-21 | Stop reason: HOSPADM

## 2021-12-20 RX ORDER — MYCOPHENOLIC ACID 360 MG/1
360 TABLET, DELAYED RELEASE ORAL EVERY EVENING
Status: DISCONTINUED | OUTPATIENT
Start: 2021-12-20 | End: 2021-12-20

## 2021-12-20 RX ORDER — PREDNISONE 10 MG/1
5 TABLET ORAL EVERY EVENING
Status: DISCONTINUED | OUTPATIENT
Start: 2021-12-20 | End: 2021-12-21 | Stop reason: HOSPADM

## 2021-12-20 RX ORDER — CLONIDINE 0.2 MG/24H
PATCH, EXTENDED RELEASE TRANSDERMAL
Status: DISCONTINUED
Start: 2021-12-20 | End: 2021-12-21 | Stop reason: HOSPADM

## 2021-12-20 RX ORDER — AMLODIPINE BESYLATE 5 MG/1
5 TABLET ORAL DAILY
Status: DISCONTINUED | OUTPATIENT
Start: 2021-12-20 | End: 2021-12-21 | Stop reason: HOSPADM

## 2021-12-20 RX ORDER — ATENOLOL 25 MG/1
25 TABLET ORAL DAILY
Status: DISCONTINUED | OUTPATIENT
Start: 2021-12-20 | End: 2021-12-21 | Stop reason: HOSPADM

## 2021-12-20 RX ORDER — AMOXICILLIN 250 MG
2 CAPSULE ORAL 2 TIMES DAILY
Status: DISCONTINUED | OUTPATIENT
Start: 2021-12-20 | End: 2021-12-21 | Stop reason: HOSPADM

## 2021-12-20 RX ORDER — DICYCLOMINE HYDROCHLORIDE 10 MG/ML
20 INJECTION INTRAMUSCULAR ONCE
Status: COMPLETED | OUTPATIENT
Start: 2021-12-20 | End: 2021-12-20

## 2021-12-20 RX ORDER — HYDROXYCHLOROQUINE SULFATE 200 MG/1
200 TABLET, FILM COATED ORAL DAILY
Status: DISCONTINUED | OUTPATIENT
Start: 2021-12-20 | End: 2021-12-21 | Stop reason: HOSPADM

## 2021-12-20 RX ORDER — DIPHENHYDRAMINE HYDROCHLORIDE 50 MG/ML
25 INJECTION INTRAMUSCULAR; INTRAVENOUS
Status: COMPLETED | OUTPATIENT
Start: 2021-12-20 | End: 2021-12-20

## 2021-12-20 RX ORDER — PANTOPRAZOLE SODIUM 40 MG/10ML
INJECTION, POWDER, LYOPHILIZED, FOR SOLUTION INTRAVENOUS
Status: ACTIVE
Start: 2021-12-20 | End: 2021-12-20

## 2021-12-20 RX ORDER — POLYETHYLENE GLYCOL 3350 17 G/17G
1 POWDER, FOR SOLUTION ORAL
Status: DISCONTINUED | OUTPATIENT
Start: 2021-12-20 | End: 2021-12-21 | Stop reason: HOSPADM

## 2021-12-20 RX ORDER — ACETAMINOPHEN 325 MG/1
650 TABLET ORAL EVERY 6 HOURS PRN
Status: DISCONTINUED | OUTPATIENT
Start: 2021-12-20 | End: 2021-12-21 | Stop reason: HOSPADM

## 2021-12-20 RX ORDER — PROMETHAZINE HYDROCHLORIDE 25 MG/1
12.5-25 SUPPOSITORY RECTAL EVERY 4 HOURS PRN
Status: DISCONTINUED | OUTPATIENT
Start: 2021-12-20 | End: 2021-12-21 | Stop reason: HOSPADM

## 2021-12-20 RX ORDER — BISACODYL 10 MG
10 SUPPOSITORY, RECTAL RECTAL
Status: DISCONTINUED | OUTPATIENT
Start: 2021-12-20 | End: 2021-12-21 | Stop reason: HOSPADM

## 2021-12-20 RX ORDER — ONDANSETRON 2 MG/ML
4 INJECTION INTRAMUSCULAR; INTRAVENOUS EVERY 4 HOURS PRN
Status: DISCONTINUED | OUTPATIENT
Start: 2021-12-20 | End: 2021-12-21 | Stop reason: HOSPADM

## 2021-12-20 RX ORDER — DIPHENHYDRAMINE HYDROCHLORIDE 50 MG/ML
25 INJECTION INTRAMUSCULAR; INTRAVENOUS EVERY 6 HOURS PRN
Status: DISCONTINUED | OUTPATIENT
Start: 2021-12-20 | End: 2021-12-21 | Stop reason: HOSPADM

## 2021-12-20 RX ADMIN — AMLODIPINE BESYLATE 5 MG: 5 TABLET ORAL at 05:56

## 2021-12-20 RX ADMIN — MYCOPHENOLATE MOFETIL 500 MG: 250 CAPSULE ORAL at 18:00

## 2021-12-20 RX ADMIN — ONDANSETRON 4 MG: 4 TABLET, ORALLY DISINTEGRATING ORAL at 12:30

## 2021-12-20 RX ADMIN — CLONIDINE 1 PATCH: 0.2 PATCH TRANSDERMAL at 06:07

## 2021-12-20 RX ADMIN — HYDROMORPHONE HYDROCHLORIDE 0.25 MG: 1 INJECTION, SOLUTION INTRAMUSCULAR; INTRAVENOUS; SUBCUTANEOUS at 05:56

## 2021-12-20 RX ADMIN — DICYCLOMINE HYDROCHLORIDE 20 MG: 10 INJECTION INTRAMUSCULAR at 02:10

## 2021-12-20 RX ADMIN — OMEPRAZOLE 20 MG: 20 CAPSULE, DELAYED RELEASE ORAL at 17:37

## 2021-12-20 RX ADMIN — PANTOPRAZOLE SODIUM 40 MG: 40 INJECTION, POWDER, LYOPHILIZED, FOR SOLUTION INTRAVENOUS at 06:10

## 2021-12-20 RX ADMIN — SENNOSIDES AND DOCUSATE SODIUM 2 TABLET: 50; 8.6 TABLET ORAL at 18:00

## 2021-12-20 RX ADMIN — HYDROXYCHLOROQUINE SULFATE 200 MG: 200 TABLET, FILM COATED ORAL at 05:56

## 2021-12-20 RX ADMIN — DIPHENHYDRAMINE HYDROCHLORIDE 25 MG: 50 INJECTION INTRAMUSCULAR; INTRAVENOUS at 16:13

## 2021-12-20 RX ADMIN — ACETAMINOPHEN 650 MG: 325 TABLET, FILM COATED ORAL at 12:54

## 2021-12-20 RX ADMIN — ACETAMINOPHEN 650 MG: 325 TABLET, FILM COATED ORAL at 16:12

## 2021-12-20 RX ADMIN — DIPHENHYDRAMINE HYDROCHLORIDE 25 MG: 50 INJECTION INTRAMUSCULAR; INTRAVENOUS at 12:54

## 2021-12-20 RX ADMIN — HYDROMORPHONE HYDROCHLORIDE 0.25 MG: 1 INJECTION, SOLUTION INTRAMUSCULAR; INTRAVENOUS; SUBCUTANEOUS at 12:30

## 2021-12-20 RX ADMIN — HYDROMORPHONE HYDROCHLORIDE 0.25 MG: 1 INJECTION, SOLUTION INTRAMUSCULAR; INTRAVENOUS; SUBCUTANEOUS at 23:19

## 2021-12-20 RX ADMIN — BISACODYL 10 MG: 5 TABLET, COATED ORAL at 04:36

## 2021-12-20 RX ADMIN — HYDROMORPHONE HYDROCHLORIDE 0.25 MG: 1 INJECTION, SOLUTION INTRAMUSCULAR; INTRAVENOUS; SUBCUTANEOUS at 18:01

## 2021-12-20 RX ADMIN — HYDROMORPHONE HYDROCHLORIDE 0.25 MG: 1 INJECTION, SOLUTION INTRAMUSCULAR; INTRAVENOUS; SUBCUTANEOUS at 09:22

## 2021-12-20 RX ADMIN — PREDNISONE 5 MG: 10 TABLET ORAL at 18:00

## 2021-12-20 RX ADMIN — CLONIDINE 1 PATCH: 0.2 PATCH, EXTENDED RELEASE TRANSDERMAL at 06:07

## 2021-12-20 RX ADMIN — ATENOLOL 25 MG: 25 TABLET ORAL at 05:56

## 2021-12-20 ASSESSMENT — ENCOUNTER SYMPTOMS
HALLUCINATIONS: 0
VOMITING: 1
DEPRESSION: 0
ABDOMINAL PAIN: 0
CLAUDICATION: 0
SPUTUM PRODUCTION: 0
HEMOPTYSIS: 0
HEADACHES: 0
SHORTNESS OF BREATH: 0
DIZZINESS: 1
INSOMNIA: 0
SPEECH CHANGE: 0
FEVER: 0
HEARTBURN: 0
DOUBLE VISION: 0
FOCAL WEAKNESS: 0
WEAKNESS: 0
PALPITATIONS: 0
VOMITING: 0
FLANK PAIN: 0
ABDOMINAL PAIN: 1
CONSTIPATION: 0
ORTHOPNEA: 0
WEIGHT LOSS: 0
COUGH: 0
POLYDIPSIA: 0
BRUISES/BLEEDS EASILY: 0
NERVOUS/ANXIOUS: 0
DIZZINESS: 0
MYALGIAS: 0
BLURRED VISION: 0
NAUSEA: 1
CHILLS: 0
DIARRHEA: 0
BLOOD IN STOOL: 0
LOSS OF CONSCIOUSNESS: 1
NECK PAIN: 0
TREMORS: 0
BACK PAIN: 0
PHOTOPHOBIA: 0
SENSORY CHANGE: 0

## 2021-12-20 ASSESSMENT — LIFESTYLE VARIABLES
AVERAGE NUMBER OF DAYS PER WEEK YOU HAVE A DRINK CONTAINING ALCOHOL: 0
HOW MANY TIMES IN THE PAST YEAR HAVE YOU HAD 5 OR MORE DRINKS IN A DAY: 0
EVER HAD A DRINK FIRST THING IN THE MORNING TO STEADY YOUR NERVES TO GET RID OF A HANGOVER: NO
HAVE PEOPLE ANNOYED YOU BY CRITICIZING YOUR DRINKING: NO
TOTAL SCORE: 0
TOTAL SCORE: 0
EVER FELT BAD OR GUILTY ABOUT YOUR DRINKING: NO
ALCOHOL_USE: NO
TOTAL SCORE: 0
SUBSTANCE_ABUSE: 0
CONSUMPTION TOTAL: NEGATIVE
HAVE YOU EVER FELT YOU SHOULD CUT DOWN ON YOUR DRINKING: NO
ON A TYPICAL DAY WHEN YOU DRINK ALCOHOL HOW MANY DRINKS DO YOU HAVE: 0

## 2021-12-20 ASSESSMENT — PATIENT HEALTH QUESTIONNAIRE - PHQ9
1. LITTLE INTEREST OR PLEASURE IN DOING THINGS: NOT AT ALL
1. LITTLE INTEREST OR PLEASURE IN DOING THINGS: NOT AT ALL
2. FEELING DOWN, DEPRESSED, IRRITABLE, OR HOPELESS: NOT AT ALL
SUM OF ALL RESPONSES TO PHQ9 QUESTIONS 1 AND 2: 0
2. FEELING DOWN, DEPRESSED, IRRITABLE, OR HOPELESS: NOT AT ALL
SUM OF ALL RESPONSES TO PHQ9 QUESTIONS 1 AND 2: 0

## 2021-12-20 ASSESSMENT — COGNITIVE AND FUNCTIONAL STATUS - GENERAL
DAILY ACTIVITIY SCORE: 24
SUGGESTED CMS G CODE MODIFIER DAILY ACTIVITY: CH
MOBILITY SCORE: 24
SUGGESTED CMS G CODE MODIFIER MOBILITY: CH

## 2021-12-20 ASSESSMENT — FIBROSIS 4 INDEX
FIB4 SCORE: 1.823211376388291888
FIB4 SCORE: 1.29
FIB4 SCORE: 1.823211376388291888

## 2021-12-20 ASSESSMENT — PAIN DESCRIPTION - PAIN TYPE
TYPE: ACUTE PAIN
TYPE: ACUTE PAIN;CHRONIC PAIN
TYPE: ACUTE PAIN;CHRONIC PAIN
TYPE: ACUTE PAIN
TYPE: ACUTE PAIN

## 2021-12-20 NOTE — ASSESSMENT & PLAN NOTE
Nephrology consulted  Hemodialysis in the hospital, Monday Wednesday Friday  Patient has functioning right AV fistula

## 2021-12-20 NOTE — ASSESSMENT & PLAN NOTE
Patient has history of GERD, esophagitis, and hemorrhagic gastritis  Abdominal x-ray showed moderate constipation  Bowel regimen ordered

## 2021-12-20 NOTE — ASSESSMENT & PLAN NOTE
Patient has history of anemia of chronic disease requiring transfusion, as well as upper GI bleed (upper endoscopy in 3/2021: esophagitis, gastritis, angiodysplasia of the stomach, Karen Keyes Tear ).  Patient has been receiving irradiated RBC with premedication with Tylenol and Benadryl.  According to her she had febrile reaction in the past  4 units of irradiated RBC ordered, transfuse once arrives from regional.

## 2021-12-20 NOTE — DISCHARGE PLANNING
Outpatient Dialysis Note    Confirmed patient is active at:    [facility]   [address]  [place] [address]    Schedule: Monday, Wednesday, Friday   Time: 06:00am     Patient is seen by Dr. Trent in HD clinic.    Spoke with Viki at facility who confirmed.    Forwarded records for review.    Mary Han- Dialysis Coordinator Ph# [phone]  [facility]

## 2021-12-20 NOTE — PROGRESS NOTES
Report received from MICHELLE Edwards. Pt resting comfortably in bed at the time of report. Plan of care discussed at bedside and patient care assumed from MICHELLE Edwards. Awaiting blood to be delivered from [facility] at this time. Patient is A&O4, VSS. Patient verbalized no questions or concerns at this time. White board updated. Bed locked and in lowest position. Safety precautions in place and call light within reach. No needs at this time.

## 2021-12-20 NOTE — ED NOTES
Patient would like to hold on the magnesium citrate. Reports has caused N/V in the past. Wants to see if the oral medication will help first.

## 2021-12-20 NOTE — ED PROVIDER NOTES
"CHIEF COMPLAINT  Chief Complaint   Patient presents with   • N/V     pt presents to the ed via [facility] from home for n/v x 1 hour       HPI  Lily Nicole is a 26 y.o. female who presents tonight via EMS from home for nausea and vomiting for 1 hour prior to arrival.  She was given Zofran in route and the nausea has improved but she is now complaining of generalized abdominal pain which she describes as crampy in nature.  She denies any diarrhea.  She has had no recent fever, chills, hematemesis.  She is a chronic dialysis patient with chronic kidney disease due to lupus.  She is normally dialyzed on Monday, Wednesday, Friday.  She is due to have dialysis at 630 this morning.  She states that after the episode of extreme nausea and vomiting she felt like she was going to pass out.  She denies any complaints of chest pain, shortness of breath.    REVIEW OF SYSTEMS  See HPI for further details. All other system reviews are negative.    PAST MEDICAL HISTORY  Past Medical History:   Diagnosis Date   • Anemia 01/17/2018   • AVF (arteriovenous fistula) ([facility])     Right Arm   • Chest pain    • Chest tightness    • Daytime sleepiness    • Dialysis patient ([facility])      dialysis, M,W,F Elizabeth/Joni   • Difficulty breathing    • ESRD (end stage renal disease) on dialysis ([facility]) 01/17/2018    Twan Fu   • Gasping for breath    • Heart burn    • Hypertension 01/17/2018    \"Controlled with medication\"   • Indigestion    • Lupus ([facility])    • Migraines 01/17/2018   • Painful breathing    • Palpitations    • Seizure ([facility]) 2013    from high blood pressure, reports 1 time event   • Shortness of breath    • Swelling of lower extremity    • Wheezing        FAMILY HISTORY  Family History   Problem Relation Age of Onset   • Diabetes Paternal Grandmother        SOCIAL HISTORY  Social History     Socioeconomic History   • Marital status: Single     Spouse name: Not on file   • Number of children: Not on file   • Years of education: Not on file "   • Highest education level: Not on file   Occupational History   • Not on file   Tobacco Use   • Smoking status: Never Smoker   • Smokeless tobacco: Never Used   Vaping Use   • Vaping Use: Never used   Substance and Sexual Activity   • Alcohol use: No   • Drug use: No   • Sexual activity: Not on file   Other Topics Concern   • Not on file   Social History Narrative   • Not on file     Social Determinants of Health     Financial Resource Strain: Low Risk    • Difficulty of Paying Living Expenses: Not hard at all   Food Insecurity: No Food Insecurity   • Worried About Running Out of Food in the Last Year: Never true   • Ran Out of Food in the Last Year: Never true   Transportation Needs: No Transportation Needs   • Lack of Transportation (Medical): No   • Lack of Transportation (Non-Medical): No   Physical Activity:    • Days of Exercise per Week: Not on file   • Minutes of Exercise per Session: Not on file   Stress:    • Feeling of Stress : Not on file   Social Connections:    • Frequency of Communication with Friends and Family: Not on file   • Frequency of Social Gatherings with Friends and Family: Not on file   • Attends Muslim Services: Not on file   • Active Member of Clubs or Organizations: Not on file   • Attends Club or Organization Meetings: Not on file   • Marital Status: Not on file   Intimate Partner Violence:    • Fear of Current or Ex-Partner: Not on file   • Emotionally Abused: Not on file   • Physically Abused: Not on file   • Sexually Abused: Not on file   Housing Stability:    • Unable to Pay for Housing in the Last Year: Not on file   • Number of Places Lived in the Last Year: Not on file   • Unstable Housing in the Last Year: Not on file       SURGICAL HISTORY  Past Surgical History:   Procedure Laterality Date   • PB BRONCHOSCOPY,DIAGNOSTIC Bilateral 5/13/2021    Procedure: BRONCHOSCOPY, BRONCHOALVEOLAR LAVAGE;  Surgeon: William Spangler M.D.;  Location: SURGERY [facility];  Service:  Pulmonary   • PB UPPER GI ENDOSCOPY,DIAGNOSIS N/A 3/19/2021    Procedure: GASTROSCOPY;  Surgeon: Waylon Mcqueen M.D.;  Location: [facility];  Service: Gastroenterology   • PB UPPER GI ENDOSCOPY,CTRL BLEED  3/19/2021    Procedure: GASTROSCOPY, WITH ARGON PLASMA COAGULATION;  Surgeon: Waylon Mcqueen M.D.;  Location: [facility];  Service: Gastroenterology   • PB UPPER GI ENDOSCOPY,DIAGNOSIS  3/5/2021    Procedure: GASTROSCOPY - W/HEMOSTASIS;  Surgeon: Ej Silva M.D.;  Location: [facility];  Service: Gastroenterology   • PB COLONOSCOPY,DIAGNOSTIC  1/8/2021    Procedure: COLONOSCOPY;  Surgeon: Herbert Contreras M.D.;  Location: [facility];  Service: Gastroenterology   • PB UPPER GI ENDOSCOPY,DIAGNOSIS  1/8/2021    Procedure: GASTROSCOPY;  Surgeon: Herbert Contreras M.D.;  Location: [facility];  Service: Gastroenterology   • PB UPPER GI ENDOSCOPY,CTRL BLEED  1/8/2021    Procedure: GASTROSCOPY, WITH ARGON PLASMA COAGULATION;  Surgeon: Herbert Contreras M.D.;  Location: [facility];  Service: Gastroenterology   • PB UPPER GI ENDOSCOPY,CTRL BLEED  11/12/2020    Procedure: GASTROSCOPY, WITH ARGON PLASMA COAGULATION;  Surgeon: Gadiel Whitney M.D.;  Location: [facility];  Service: Gastroenterology   • PB UPPER GI ENDOSCOPY,BIOPSY  11/12/2020    Procedure: GASTROSCOPY, WITH BIOPSY;  Surgeon: Gadiel Whitney M.D.;  Location: [facility];  Service: Gastroenterology   • GASTROSCOPY-ENDO  11/12/2020    Procedure: GASTROSCOPY;  Surgeon: Gadiel Whitney M.D.;  Location: [facility];  Service: Gastroenterology   • GASTROSCOPY-ENDO  9/18/2020    Procedure: GASTROSCOPY;  Surgeon: Gadiel Whitney M.D.;  Location: [facility];  Service: Gastroenterology   • GASTROSCOPY N/A 5/30/2020    Procedure: GASTROSCOPY;  Surgeon: Waylon Mcqueen M.D.;  Location: [facility];  Service: Gastroenterology   • GASTROSCOPY-ENDO  12/9/2019     "Procedure: GASTROSCOPY;  Surgeon: Aaron Kam M.D.;  Location: SURGERY [facility];  Service: Gastroenterology   • ANGIOPLASTY  01/17/2018    \"Right Arm AV-Fistulagram & Angioplastyx3\"   • ULI BY LAPAROSCOPY  4/5/2010    Performed by SYED MARTELL at SURGERY [facility]   • AV FISTULA CREATION Right    • OTHER      renal biopsy x 3   • OTHER      bone marrow biopsy       CURRENT MEDICATIONS  See nurses notes    ALLERGIES  Allergies   Allergen Reactions   • Cephalexin Rash     Nausea and rash    • Clindamycin Rash     Nausea and rash      • Methylprednisolone Unspecified     Anxious   • Metoprolol Rash     Nausea and rash      • Fentanyl And Related Anxiety   • Maxipime [Cefepime] Itching   • Norco [Hydrocodone-Acetaminophen] Rash and Nausea     Generalized rash   • Reglan [Metoclopramide] Anxiety   • Tape        PHYSICAL EXAM  VITAL SIGNS: /102   Pulse 92   Temp 36.8 °C (98.2 °F)   Resp 15   Ht 1.702 m (5' 7\")   Wt 54.4 kg (120 lb)   SpO2 100%   BMI 18.79 kg/m²     Constitutional: Patient is an ill-appearing female in mild distress.  HENT: Normocephalic. Oropharynx moist without erythema or exudates, nose normal with no mucosal edema or drainage.   Eyes: PERRL, EOMI  Neck: Supple.    Lymphatic: No lymphadenopathy noted.   Cardiovascular: Normal heart rate and rhythm. No murmur,  Good heart tones.  Thorax & Lungs: Clear and equal breath sounds with good excursion. No respiratory distress, no rhonchi, wheezing or rales.  Abdomen: Bowel sounds normal in all four quadrants. Soft, thin with generalized tenderness, no rebound, guarding, flank tenderness, palpable masses.  Skin: Warm, Dry, sallow appearing.  Back: No cervical, thoracic, or lumbosacral tenderness.  Extremities: Peripheral pulses 4/4 No edema, No tenderness, AV fistula right upper extremity, palpable thrill and bruit.  Musculoskeletal: Normal range of motion in all major joints. No tenderness to palpation or major " deformities noted.   Neurologic: Alert & oriented x 3, Normal motor function, Normal sensory function, DTR's 4/4 bilaterally.  Psychiatric: Affect normal, Judgment normal, Mood normal.     Results for orders placed or performed during the hospital encounter of 12/20/21   CBC WITH DIFFERENTIAL   Result Value Ref Range    WBC 3.5 (L) 4.8 - 10.8 K/uL    RBC 2.27 (L) 4.20 - 5.40 M/uL    Hemoglobin 6.1 (L) 12.0 - 16.0 g/dL    Hematocrit 20.8 (L) 37.0 - 47.0 %    MCV 91.6 81.4 - 97.8 fL    MCH 26.9 (L) 27.0 - 33.0 pg    MCHC 29.3 (L) 33.6 - 35.0 g/dL    RDW 61.6 (H) 35.9 - 50.0 fL    Platelet Count 95 (L) 164 - 446 K/uL    MPV 10.9 9.0 - 12.9 fL    Neutrophils-Polys 80.70 (H) 44.00 - 72.00 %    Lymphocytes 13.60 (L) 22.00 - 41.00 %    Monocytes 3.70 0.00 - 13.40 %    Eosinophils 1.40 0.00 - 6.90 %    Basophils 0.30 0.00 - 1.80 %    Immature Granulocytes 0.30 0.00 - 0.90 %    Nucleated RBC 0.00 /100 WBC    Neutrophils (Absolute) 2.85 2.00 - 7.15 K/uL    Lymphs (Absolute) 0.48 (L) 1.00 - 4.80 K/uL    Monos (Absolute) 0.13 0.00 - 0.85 K/uL    Eos (Absolute) 0.05 0.00 - 0.51 K/uL    Baso (Absolute) 0.01 0.00 - 0.12 K/uL    Immature Granulocytes (abs) 0.01 0.00 - 0.11 K/uL    NRBC (Absolute) 0.00 K/uL   COMP METABOLIC PANEL   Result Value Ref Range    Sodium 131 (L) 135 - 145 mmol/L    Potassium 5.1 3.6 - 5.5 mmol/L    Chloride 89 (L) 96 - 112 mmol/L    Co2 26 20 - 33 mmol/L    Anion Gap 16.0 7.0 - 16.0    Glucose 97 65 - 99 mg/dL    Bun 58 (H) 8 - 22 mg/dL    Creatinine 7.50 (HH) 0.50 - 1.40 mg/dL    Calcium 8.6 8.4 - 10.2 mg/dL    AST(SGOT) 25 12 - 45 U/L    ALT(SGPT) 12 2 - 50 U/L    Alkaline Phosphatase 94 30 - 99 U/L    Total Bilirubin 0.3 0.1 - 1.5 mg/dL    Albumin 2.9 (L) 3.2 - 4.9 g/dL    Total Protein 8.3 (H) 6.0 - 8.2 g/dL    Globulin 5.4 (H) 1.9 - 3.5 g/dL    A-G Ratio 0.5 g/dL   DIFFERENTIAL COMMENT   Result Value Ref Range    Comments-Diff see below    ESTIMATED GFR   Result Value Ref Range    GFR If   American 8 (A) >60 mL/min/1.73 m 2    GFR If Non African American 7 (A) >60 mL/min/1.73 m 2   PROTHROMBIN TIME (INR)   Result Value Ref Range    PT 14.8 (H) 12.0 - 14.6 sec    INR 1.26 (H) 0.87 - 1.13   APTT   Result Value Ref Range    APTT 33.3 24.7 - 36.0 sec   COD (ADULT)   Result Value Ref Range    ABO Grouping Only O     Rh Grouping Only POS     Antibody Screen-Cod NEG          RADIOLOGY/PROCEDURES  [id number] VIEW   Final Result         Moderate amount of stool throughout the colon.            COURSE & MEDICAL DECISION MAKING  Pertinent Labs & Imaging studies reviewed. (See chart for details)  Patient received an IV, laboratories were drawn and she was given Bentyl 20 mg IM initially for crampy abdominal pain.  She states this did not help.  3 view abdomen shows increased stool consistent with constipation and her chest x-ray was negative for any signs of heart failure or pneumonia.  Patient does not require narcotic pain medications this time as this will worsen her constipation but she will require some laxatives which she was given in the emergency department.  Her laboratories show her to be very anemic with a hemoglobin of 6.1, hematocrit 20.8, white cells of 3.5 RBC is 2.27, platelets are 95.  Her electrolytes are all consistent with chronic kidney disease with a sodium of 131, chloride 89,, BUN 58 with a creatinine of 7.5.  Patient is due to be dialyzed this morning at 630.  She will require at least 2 units of packed red blood cells but per laboratory will need irradiated blood.  This needs to be sent to [facility] for COD and I was informed will take at least 3 to 4 hours to do so before it can be sent here for her transfusion.  Patient is stable but will require dialysis as her BUN and creatinine are more elevated than her recent laboratories.  4:10 am  I spoke with Dr. Najjar on-call for nephrology who will arrange for her to have dialysis later today in the hospital.  4:12am I  spoke with Dr. Peñaloza on-call for hospitalist who has agreed to admit the patient into the hospital for transfusion and dialysis.  She is currently in guarded condition.    FINAL IMPRESSION  1.  Symptomatic anemia  2.  Chronic kidney disease on chronic dialysis  3.  Generalized abdominal pain  4.  Constipation  5.  Hypertension secondary to renal disorder  6.  History of lupus  7.  Near syncope.         Electronically signed by: Alix Huntley D.O., 12/20/2021 4:12 [facility] Provider Note

## 2021-12-20 NOTE — ASSESSMENT & PLAN NOTE
Multiple affected organs, kidneys, lungs, cardiovascular, bone marrow (pancytopenia)  Resume mycophenolate, Plaquenil and prednisone  Follow-up with rheumatology as outpatient

## 2021-12-20 NOTE — ED NOTES
Patient reports abdominal pain worsening. MD notified. Offered medication for constipation. Patient denied medication.

## 2021-12-20 NOTE — PROGRESS NOTES
4 Eyes Skin Assessment Completed by MICHELLE Lorenzo and MICHELLE Jones.    Head WDL  Ears WDL  Nose WDL  Mouth WDL  Neck WDL  Breast/Chest Discoloration and Scar  Shoulder Blades WDL  Spine WDL  (R) Arm/Elbow/Hand Discoloration and Scar fistula in place  (L) Arm/Elbow/Hand WDL  Abdomen Scar discoloration   Groin WDL  Scrotum/Coccyx/Buttocks WDL  (R) Leg WDL  (L) Leg WDL  (R) Heel/Foot/Toe WDL  (L) Heel/Foot/Toe WDL          Devices In Places Tele Box, Pulse Ox and Nasal Cannula       Interventions In Place NC W/Ear Foams, Pillows and Pressure Redistribution Mattress    Possible Skin Injury No    Pictures Uploaded Into Epic N/A  Wound Consult Placed N/A  RN Wound Prevention Protocol Ordered No

## 2021-12-20 NOTE — ED NOTES
AIDET acknowledged with patient. Stretcher in lowest position, side rail up for patient safety, and call light in reach. Will continue to monitor.

## 2021-12-20 NOTE — ASSESSMENT & PLAN NOTE
History of upper GI bleed   (upper endoscopy in 3/2021: esophagitis, gastritis, angiodysplasia of the stomach, Karen Keyes Tear ).  Complaints of mid and upper abdominal pain and 2 episodes of vomiting with a small amount of bright blood before admission.  Plan:  Admit to telemetry  RBC transfusion for symptomatic anemia with hemoglobin 6.1  PO omeprazole  No further episode following emesis

## 2021-12-20 NOTE — PROGRESS NOTES
"[facility] spoke [facility] regarding when blood will arrive for patient. [facility], Yu stated \"it will be a while before it arrives there\". Charge RN notified.   "

## 2021-12-20 NOTE — H&P
Hospital Medicine History & Physical Note    Date of Service  12/20/2021    Primary Care Physician  Ella Matthew M.D.    Consultants  none    Specialist Names: n/a    Code Status  Full Code    Chief Complaint  Chief Complaint   Patient presents with   • N/V     pt presents to the ed via [facility] from home for n/v x 1 hour       History of Presenting Illness  Lily Nicole is a 26 y.o. female is past medical history of lupus on mycophenolate, Plaquenil, prednisone, end-stage renal disease on hemodialysis Monday Wednesday Friday, seizure disorder, hypertension, GERD, prior history of GI bleed, anemia requiring blood transfusions, chronic hypoxia on 2 to 3 L, who presented 12/20/2021 with complaint of near syncopal episode that happened when she got up to go to the bathroom, as well as upper and mid sharp abdominal pain, nausea, 2 episodes of vomiting with a small amount of bright blood.  She did have bowel movement which was brown, solid without blood.  She still produces urine.  Patient denies taking NSAID and she is not on anticoagulants..  In ER patient found to have hemoglobin 6.1.  4 units of RBC transfusion was ordered.  Patient did get irradiated RBC with premedication of Tylenol and Benadryl.  Irradiated RBC units were ordered from the main hospital.  Patient is due for hemodialysis today.    I discussed the plan of care with patient.    Review of Systems  Review of Systems   Constitutional: Negative for chills, fever and weight loss.   HENT: Negative for ear pain, hearing loss and tinnitus.    Eyes: Negative for blurred vision, double vision and photophobia.   Respiratory: Negative for cough, hemoptysis and sputum production.    Cardiovascular: Negative for chest pain, palpitations and orthopnea.   Gastrointestinal: Positive for abdominal pain, nausea and vomiting. Negative for blood in stool, constipation, diarrhea and heartburn.   Genitourinary: Negative for dysuria, flank pain, frequency and  hematuria.   Musculoskeletal: Positive for joint pain. Negative for back pain and neck pain.   Skin: Negative for itching and rash.   Neurological: Positive for dizziness and loss of consciousness (Near syncope). Negative for tremors, speech change, focal weakness and headaches.   Endo/Heme/Allergies: Negative for environmental allergies and polydipsia. Does not bruise/bleed easily.   Psychiatric/Behavioral: Negative for hallucinations and substance abuse. The patient is not nervous/anxious.        Past Medical History   has a past medical history of Anemia (01/17/2018), AVF (arteriovenous fistula) ([facility]), Chest pain, Chest tightness, Daytime sleepiness, Dialysis patient ([facility]), Difficulty breathing, ESRD (end stage renal disease) on dialysis ([facility]) (01/17/2018), Gasping for breath, Heart burn, Hypertension (01/17/2018), Indigestion, Lupus ([facility]), Migraines (01/17/2018), Painful breathing, Palpitations, Seizure ([facility]) (2013), Shortness of breath, Swelling of lower extremity, and Wheezing.    Surgical History   has a past surgical history that includes ronak by laparoscopy (4/5/2010); av fistula creation (Right); angioplasty (01/17/2018); other; other; gastroscopy-endo (12/9/2019); gastroscopy (N/A, 5/30/2020); gastroscopy-endo (9/18/2020); pr upper gi endoscopy,ctrl bleed (11/12/2020); pr upper gi endoscopy,biopsy (11/12/2020); gastroscopy-endo (11/12/2020); pr colonoscopy,diagnostic (1/8/2021); pr upper gi endoscopy,diagnosis (1/8/2021); pr upper gi endoscopy,ctrl bleed (1/8/2021); pr upper gi endoscopy,diagnosis (3/5/2021); pr upper gi endoscopy,diagnosis (N/A, 3/19/2021); pr upper gi endoscopy,ctrl bleed (3/19/2021); and pr bronchoscopy,diagnostic (Bilateral, 5/13/2021).     Family History     Family history reviewed with patient. There is no family history that is pertinent to the chief complaint.     Social History   reports that she has never smoked. She has never used smokeless tobacco. She reports that she does  not drink alcohol and does not use drugs.    Allergies  Allergies   Allergen Reactions   • Cephalexin Rash     Nausea and rash    • Clindamycin Rash     Nausea and rash      • Methylprednisolone Unspecified     Anxious   • Metoprolol Rash     Nausea and rash      • Fentanyl And Related Anxiety   • Maxipime [Cefepime] Itching   • Norco [Hydrocodone-Acetaminophen] Rash and Nausea     Generalized rash   • Reglan [Metoclopramide] Anxiety   • Tape        Medications  Prior to Admission Medications   Prescriptions Last Dose Informant Patient Reported? Taking?   acetaminophen (TYLENOL) 500 MG Tab  Patient Yes No   Sig: Take 1,000 mg by mouth every 6 hours as needed for Moderate Pain.   amLODIPine (NORVASC) 5 MG Tab  Patient Yes No   Sig: Take 5 mg by mouth every day.   atenolol (TENORMIN) 25 MG Tab  Patient Yes No   Sig: Take 25 mg by mouth every day.   cloNIDine (CATAPRES) 0.2 MG/24HR PATCH WEEKLY patch  Patient Yes No   Sig: Place 1 Patch on the skin every 7 days. On Sat   hydroxychloroquine (PLAQUENIL) 200 MG Tab  Patient Yes No   Sig: Take 200 mg by mouth every day.   mycophenolate sodium (MYFORTIC) 360 MG Tablet Delayed Response tablet  Patient Yes No   Sig: Take 360 mg by mouth every evening.   ondansetron (ZOFRAN ODT) 4 MG TABLET DISPERSIBLE  Patient No No   Sig: Take 1 tablet by mouth every four hours as needed for Nausea.   pantoprazole (PROTONIX) 40 MG Tablet Delayed Response  Patient No No   Sig: Take 1 tablet by mouth 2 times a day.   predniSONE (DELTASONE) 5 MG Tab  Patient Yes No   Sig: Take 5 mg by mouth every evening.      Facility-Administered Medications: None       Physical Exam  Temp:  [36.8 °C (98.2 °F)] 36.8 °C (98.2 °F)  Pulse:  [84-92] 84  Resp:  [15-19] 19  BP: (157)/(102) 157/102  SpO2:  [95 %-100 %] 100 %  Blood Pressure: 157/102   Temperature: 36.8 °C (98.2 °F)   Pulse: 84   Respiration: 19   Pulse Oximetry: 100 %       Physical Exam  Vitals and nursing note reviewed.   Constitutional:        General: She is not in acute distress.     Appearance: Normal appearance.   HENT:      Head: Normocephalic and atraumatic.      Nose: Nose normal.      Mouth/Throat:      Mouth: Mucous membranes are moist.   Eyes:      Extraocular Movements: Extraocular movements intact.      Pupils: Pupils are equal, round, and reactive to light.   Cardiovascular:      Rate and Rhythm: Normal rate and regular rhythm.   Pulmonary:      Effort: Pulmonary effort is normal.      Breath sounds: Normal breath sounds.   Abdominal:      General: Abdomen is flat. There is no distension.      Tenderness: There is abdominal tenderness in the epigastric area and periumbilical area. There is no guarding or rebound.   Musculoskeletal:         General: No swelling or deformity. Normal range of motion.      Cervical back: Normal range of motion and neck supple.      Comments: Right  arm: AV fistula with bruit   Skin:     General: Skin is warm and dry.      Coloration: Skin is pale.   Neurological:      General: No focal deficit present.      Mental Status: She is alert and oriented to person, place, and time.   Psychiatric:         Mood and Affect: Mood normal.         Behavior: Behavior normal.         Laboratory:  Recent Labs     12/20/21 0216   WBC 3.5*   RBC 2.27*   HEMOGLOBIN 6.1*   HEMATOCRIT 20.8*   MCV 91.6   MCH 26.9*   MCHC 29.3*   RDW 61.6*   PLATELETCT 95*   MPV 10.9     Recent Labs     12/20/21 0216   SODIUM 131*   POTASSIUM 5.1   CHLORIDE 89*   CO2 26   GLUCOSE 97   BUN 58*   CREATININE 7.50*   CALCIUM 8.6     Recent Labs     12/20/21  0216   ALTSGPT 12   ASTSGOT 25   ALKPHOSPHAT 94   TBILIRUBIN 0.3   GLUCOSE 97     Recent Labs     12/20/21  0255   APTT 33.3   INR 1.26*     No results for input(s): NTPROBNP in the last 72 hours.      No results for input(s): TROPONINT in the last 72 hours.    Imaging:  [id number]   Final Result         Moderate amount of stool throughout the colon.          X-Ray:  I have personally reviewed  the images and compared with prior images.    Assessment/Plan:  I anticipate this patient is appropriate for observation status at this time.    * Anemia requiring transfusions- (present on admission)  Assessment & Plan  Patient has history of anemia of chronic disease requiring transfusion, as well as upper GI bleed (upper endoscopy in 3/2021: esophagitis, gastritis, angiodysplasia of the stomach, Karen Keyes Tear ).  Patient has been receiving irradiated RBC with premedication with Tylenol and Benadryl.  According to her she had febrile reaction in the past  4 units of irradiated RBC ordered      Upper GI bleed  Assessment & Plan  History of upper GI bleed   (upper endoscopy in 3/2021: esophagitis, gastritis, angiodysplasia of the stomach, Karen Keyes Tear ).  Complaints of mid and upper abdominal pain and 2 episodes of vomiting with a small amount of bright blood before admission.  Plan:  Admit to telemetry  RBC transfusion for symptomatic anemia with hemoglobin 6.1  IV Protonix drip  Monitor H&H  In case of recurrent episodes of hematemesis or continued drop in hemoglobin, consider GI consult    Chronic respiratory failure with hypoxia (HCC)- (present on admission)  Assessment & Plan  Continue supplemental oxygen    Lupus (HCC)- (present on admission)  Assessment & Plan  Multiple affected organs, kidneys, lungs, cardiovascular, bone marrow (pancytopenia)  Resume mycophenolate, Plaquenil and prednisone  Follow-up with rheumatology as outpatient      HTN (hypertension)- (present on admission)  Assessment & Plan  Resume home blood pressure medications  Monitor blood pressure    ESRD (end stage renal disease) on dialysis (HCC)- (present on admission)  Assessment & Plan  Nephrology will be consulted by ERP for routine hemodialysis in the hospital, Monday Wednesday Friday  Patient has functioning right AV fistula      VTE prophylaxis: pharmacologic prophylaxis contraindicated due to GI bleed     Discussed with  patient the risks, benefits and alternatives to blood product administration per hospital guidelines and procedures, patient understands and is in agreement with receiving treatment via blood product administration.

## 2021-12-20 NOTE — CARE PLAN
The patient is Watcher - Medium risk of patient condition declining or worsening    Shift Goals  Clinical Goals: administer blood transfusion, Dialysis, manage pain  Patient Goals: rest, pain control  Family Goals: no family present    Progress made toward(s) clinical / shift goals:        Problem: Pain - Standard  Goal: Alleviation of pain or a reduction in pain to the patient’s comfort goal  Outcome: Progressing   Problem: Knowledge Deficit - Standard  Goal: Patient and family/care givers will demonstrate understanding of plan of care, disease process/condition, diagnostic tests and medications  Outcome: Progressing   Discussed plan of care with patient. Patient verbalizes no questions or concerns at this time.   Problem: Fall Risk  Goal: Patient will remain free from falls  Outcome: Progressing   Bed locked and in lowest position. Patient refusing use of bed/strip alarm at this time. Patient uses call light and asks for assistance appropriately.   Problem: Communication  Goal: The ability to communicate needs accurately and effectively will improve  Outcome: Progressing   Patient communicating needs with nursing appropriately.

## 2021-12-20 NOTE — PROGRESS NOTES
[facility] Services Progress Note     Hemodialysis treatment ordered today per Dr. Trent x 3 hours.   Treatment initiated at 0924 ended at 1224.   Patient awake and coherent; on oxygen at 4LPM via NC; able to ambulate to restroom.  Still awaiting for available blood for transfusion     Patient tolerated tx; see paper flow sheet for details.      Net UF 3000 mL.      Needles removed from access site. Dressings applied and sites held x 10 minutes; verified no bleeding. Positive bruit/thrill post tx.   Staff RN to monitor AVF/G for breakthrough bleeding. Should breakthrough bleeding occur, staff RN to apply pressure to access sites until bleeding resolved.   Notify Dialysis and Nephrologist for follow-up.     Report given to Primary RN CHANCE Romero.  1 unit pRBC to be started by primary RN  Seen and examined by Dr. Trent   Ordered PUF tx for tomorrow

## 2021-12-20 NOTE — PROGRESS NOTES
Nick from Lab called with critical result of hemoglobin 5.5 at 0533. Critical lab result read back to Kristine.   Dr. Peñaloza notified of critical lab result at 0538.  Critical lab result read back by Dr. Peñaloza.

## 2021-12-20 NOTE — HOSPITAL COURSE
Lily Nicole is a 26 y.o. female is past medical history of lupus on mycophenolate, Plaquenil, prednisone, end-stage renal disease on hemodialysis Monday Wednesday Friday, seizure disorder, hypertension, GERD, prior history of GI bleed, anemia requiring blood transfusions, chronic hypoxia on 2 to 3 L, who presented 12/20/2021 with complaint of near syncopal episode that happened when she got up to go to the bathroom, as well as upper and mid sharp abdominal pain, nausea, 2 episodes of vomiting with a small amount of bright blood.  She did have bowel movement which was brown, solid without blood.  She still produces urine.  Patient denies taking NSAID and she is not on anticoagulants..  In ER patient found to have hemoglobin 6.1.  4 units of irradiated RBC transfusion was ordered.

## 2021-12-20 NOTE — PROGRESS NOTES
Hospital Medicine Daily Progress Note    Date of Service  12/20/2021    Chief Complaint  Lily Nicole is a 26 y.o. female admitted 12/20/2021 with weakness and presyncope.    Hospital Course  Lily Nicole is a 26 y.o. female is past medical history of lupus on mycophenolate, Plaquenil, prednisone, end-stage renal disease on hemodialysis Monday Wednesday Friday, seizure disorder, hypertension, GERD, prior history of GI bleed, anemia requiring blood transfusions, chronic hypoxia on 2 to 3 L, who presented 12/20/2021 with complaint of near syncopal episode that happened when she got up to go to the bathroom, as well as upper and mid sharp abdominal pain, nausea, 2 episodes of vomiting with a small amount of bright blood.  She did have bowel movement which was brown, solid without blood.  She still produces urine.  Patient denies taking NSAID and she is not on anticoagulants..  In ER patient found to have hemoglobin 6.1.  4 units of irradiated RBC transfusion was ordered.        Interval Problem Update  12/20 Patient seen while receiving HD, blood is to be delivered today from [facility] and hopefully transfused during HD. No further evidence of hematemesis and the amount was very small at the time.  H/H to still trend.  GI not yet consulted as no acute evidence of bleeding.      I have personally seen and examined the patient at bedside. I discussed the plan of care with patient, bedside RN, charge RN,  and pharmacy.    Consultants/Specialty  none    Code Status  Full Code    Disposition  Patient is not medically cleared for discharge.   Anticipate discharge to to home with close outpatient follow-up.  I have placed the appropriate orders for post-discharge needs.    Review of Systems  Review of Systems   Constitutional: Negative for chills and fever.   HENT: Negative for congestion.    Eyes: Negative for blurred vision and photophobia.   Respiratory: Negative for cough and shortness of breath.     Cardiovascular: Negative for chest pain, claudication and leg swelling.   Gastrointestinal: Negative for abdominal pain, constipation, diarrhea, heartburn and vomiting.   Genitourinary: Negative for dysuria and hematuria.   Musculoskeletal: Negative for joint pain and myalgias.   Skin: Negative for itching and rash.   Neurological: Negative for dizziness, sensory change, speech change, weakness and headaches.   Psychiatric/Behavioral: Negative for depression. The patient is not nervous/anxious and does not have insomnia.         Physical Exam  Temp:  [36.7 °C (98.1 °F)-36.8 °C (98.2 °F)] 36.7 °C (98.1 °F)  Pulse:  [77-92] 80  Resp:  [15-19] 17  BP: (135-157)/() 141/96  SpO2:  [95 %-100 %] 100 %    Physical Exam  Vitals and nursing note reviewed.   Constitutional:       General: She is not in acute distress.     Appearance: Normal appearance. She is not ill-appearing.   HENT:      Head: Normocephalic and atraumatic.      Nose: Nose normal.   Eyes:      General: No scleral icterus.  Cardiovascular:      Rate and Rhythm: Normal rate and regular rhythm.      Heart sounds: Normal heart sounds. No murmur heard.      Pulmonary:      Effort: Pulmonary effort is normal.      Breath sounds: Normal breath sounds.   Abdominal:      General: Bowel sounds are normal. There is no distension.      Palpations: Abdomen is soft.   Musculoskeletal:         General: No swelling or tenderness.      Cervical back: Neck supple.   Skin:     General: Skin is warm and dry.   Neurological:      General: No focal deficit present.      Mental Status: She is alert and oriented to person, place, and time.   Psychiatric:         Mood and Affect: Mood normal.         Fluids  No intake or output data in the 24 hours ending 12/20/21 1133    Laboratory  Recent Labs     12/20/21  0216 12/20/21  0508   WBC 3.5*  --    RBC 2.27*  --    HEMOGLOBIN 6.1* 5.5*   HEMATOCRIT 20.8*  --    MCV 91.6  --    MCH 26.9*  --    MCHC 29.3*  --    RDW 61.6*  --     PLATELETCT 95*  --    MPV 10.9  --      Recent Labs     12/20/21  0216   SODIUM 131*   POTASSIUM 5.1   CHLORIDE 89*   CO2 26   GLUCOSE 97   BUN 58*   CREATININE 7.50*   CALCIUM 8.6     Recent Labs     12/20/21  0255   APTT 33.3   INR 1.26*               Imaging  [id number] [id number]   Final Result         Moderate amount of stool throughout the colon.           Assessment/Plan  * Anemia requiring transfusions- (present on admission)  Assessment & Plan  Patient has history of anemia of chronic disease requiring transfusion, as well as upper GI bleed (upper endoscopy in 3/2021: esophagitis, gastritis, angiodysplasia of the stomach, Karen Keyes Tear ).  Patient has been receiving irradiated RBC with premedication with Tylenol and Benadryl.  According to her she had febrile reaction in the past  4 units of irradiated RBC ordered, transfuse once arrives from regional.      AP (abdominal pain)  Assessment & Plan  Patient has history of GERD, esophagitis, and hemorrhagic gastritis  Abdominal x-ray showed moderate constipation  Bowel regimen ordered    Chronic respiratory failure with hypoxia (HCC)- (present on admission)  Assessment & Plan  Continue supplemental oxygen    Upper GI bleed  Assessment & Plan  History of upper GI bleed   (upper endoscopy in 3/2021: esophagitis, gastritis, angiodysplasia of the stomach, Karen Keyes Tear ).  Complaints of mid and upper abdominal pain and 2 episodes of vomiting with a small amount of bright blood before admission.  Plan:  Admit to telemetry  RBC transfusion for symptomatic anemia with hemoglobin 6.1  PO omeprazole  No further episode following emesis    Lupus (HCC)- (present on admission)  Assessment & Plan  Multiple affected organs, kidneys, lungs, cardiovascular, bone marrow (pancytopenia)  Resume mycophenolate, Plaquenil and prednisone  Follow-up with rheumatology as outpatient      HTN (hypertension)- (present on admission)  Assessment & Plan  Resume home blood pressure  medications  Monitor blood pressure    ESRD (end stage renal disease) on dialysis (HCC)- (present on admission)  Assessment & Plan  Nephrology consulted  Hemodialysis in the hospital, Monday Wednesday Friday  Patient has functioning right AV fistula         VTE prophylaxis: SCDs/TEDs    I have performed a physical exam and reviewed and updated ROS and Plan today (12/20/2021). In review of yesterday's note (12/19/2021), there are no changes except as documented above.

## 2021-12-20 NOTE — PROGRESS NOTES
Per Dr. Trent, administer two units of blood today and have blood available to administer with dialysis tomorrow.  MD Chadwick updated via voalte.

## 2021-12-21 ENCOUNTER — PATIENT OUTREACH (OUTPATIENT)
Dept: HEALTH INFORMATION MANAGEMENT | Facility: OTHER | Age: 26
End: 2021-12-21

## 2021-12-21 VITALS
DIASTOLIC BLOOD PRESSURE: 82 MMHG | TEMPERATURE: 98.4 F | WEIGHT: 121.25 LBS | OXYGEN SATURATION: 94 % | HEART RATE: 68 BPM | RESPIRATION RATE: 15 BRPM | SYSTOLIC BLOOD PRESSURE: 121 MMHG | BODY MASS INDEX: 19.03 KG/M2 | HEIGHT: 67 IN

## 2021-12-21 LAB
ALBUMIN SERPL BCP-MCNC: 3 G/DL (ref 3.2–4.9)
ALBUMIN/GLOB SERPL: 0.6 G/DL
ALP SERPL-CCNC: 75 U/L (ref 30–99)
ALT SERPL-CCNC: 11 U/L (ref 2–50)
ANION GAP SERPL CALC-SCNC: 11 MMOL/L (ref 7–16)
AST SERPL-CCNC: 23 U/L (ref 12–45)
BASOPHILS # BLD AUTO: 0.7 % (ref 0–1.8)
BASOPHILS # BLD: 0.03 K/UL (ref 0–0.12)
BILIRUB SERPL-MCNC: 0.5 MG/DL (ref 0.1–1.5)
BUN SERPL-MCNC: 28 MG/DL (ref 8–22)
CALCIUM SERPL-MCNC: 8.8 MG/DL (ref 8.4–10.2)
CHLORIDE SERPL-SCNC: 95 MMOL/L (ref 96–112)
CO2 SERPL-SCNC: 27 MMOL/L (ref 20–33)
CREAT SERPL-MCNC: 4.72 MG/DL (ref 0.5–1.4)
EOSINOPHIL # BLD AUTO: 0.13 K/UL (ref 0–0.51)
EOSINOPHIL NFR BLD: 3 % (ref 0–6.9)
ERYTHROCYTE [DISTWIDTH] IN BLOOD BY AUTOMATED COUNT: 56.1 FL (ref 35.9–50)
GLOBULIN SER CALC-MCNC: 5.4 G/DL (ref 1.9–3.5)
GLUCOSE SERPL-MCNC: 80 MG/DL (ref 65–99)
HCT VFR BLD AUTO: 29 % (ref 37–47)
HGB BLD-MCNC: 10.5 G/DL (ref 12–16)
HGB BLD-MCNC: 9 G/DL (ref 12–16)
IMM GRANULOCYTES # BLD AUTO: 0.02 K/UL (ref 0–0.11)
IMM GRANULOCYTES NFR BLD AUTO: 0.5 % (ref 0–0.9)
LYMPHOCYTES # BLD AUTO: 0.4 K/UL (ref 1–4.8)
LYMPHOCYTES NFR BLD: 9.1 % (ref 22–41)
MCH RBC QN AUTO: 27.5 PG (ref 27–33)
MCHC RBC AUTO-ENTMCNC: 31 G/DL (ref 33.6–35)
MCV RBC AUTO: 88.7 FL (ref 81.4–97.8)
MONOCYTES # BLD AUTO: 0.29 K/UL (ref 0–0.85)
MONOCYTES NFR BLD AUTO: 6.6 % (ref 0–13.4)
NEUTROPHILS # BLD AUTO: 3.51 K/UL (ref 2–7.15)
NEUTROPHILS NFR BLD: 80.1 % (ref 44–72)
NRBC # BLD AUTO: 0 K/UL
NRBC BLD-RTO: 0 /100 WBC
PLATELET # BLD AUTO: 75 K/UL (ref 164–446)
PMV BLD AUTO: 11.6 FL (ref 9–12.9)
POTASSIUM SERPL-SCNC: 4.7 MMOL/L (ref 3.6–5.5)
PROT SERPL-MCNC: 8.4 G/DL (ref 6–8.2)
RBC # BLD AUTO: 3.27 M/UL (ref 4.2–5.4)
SODIUM SERPL-SCNC: 133 MMOL/L (ref 135–145)
WBC # BLD AUTO: 4.4 K/UL (ref 4.8–10.8)

## 2021-12-21 PROCEDURE — 99239 HOSP IP/OBS DSCHRG MGMT >30: CPT | Performed by: INTERNAL MEDICINE

## 2021-12-21 PROCEDURE — 700102 HCHG RX REV CODE 250 W/ 637 OVERRIDE(OP): Performed by: INTERNAL MEDICINE

## 2021-12-21 PROCEDURE — 5A1D70Z PERFORMANCE OF URINARY FILTRATION, INTERMITTENT, LESS THAN 6 HOURS PER DAY: ICD-10-PCS | Performed by: INTERNAL MEDICINE

## 2021-12-21 PROCEDURE — 700111 HCHG RX REV CODE 636 W/ 250 OVERRIDE (IP): Performed by: INTERNAL MEDICINE

## 2021-12-21 PROCEDURE — A9270 NON-COVERED ITEM OR SERVICE: HCPCS | Performed by: INTERNAL MEDICINE

## 2021-12-21 PROCEDURE — 90935 HEMODIALYSIS ONE EVALUATION: CPT

## 2021-12-21 PROCEDURE — 85018 HEMOGLOBIN: CPT

## 2021-12-21 PROCEDURE — 85025 COMPLETE CBC W/AUTO DIFF WBC: CPT

## 2021-12-21 PROCEDURE — 90935 HEMODIALYSIS ONE EVALUATION: CPT | Performed by: INTERNAL MEDICINE

## 2021-12-21 PROCEDURE — 80053 COMPREHEN METABOLIC PANEL: CPT

## 2021-12-21 RX ADMIN — OMEPRAZOLE 20 MG: 20 CAPSULE, DELAYED RELEASE ORAL at 05:21

## 2021-12-21 RX ADMIN — AMLODIPINE BESYLATE 5 MG: 5 TABLET ORAL at 05:21

## 2021-12-21 RX ADMIN — ONDANSETRON 4 MG: 2 INJECTION INTRAMUSCULAR; INTRAVENOUS at 05:53

## 2021-12-21 RX ADMIN — ATENOLOL 25 MG: 25 TABLET ORAL at 05:21

## 2021-12-21 RX ADMIN — HYDROMORPHONE HYDROCHLORIDE 0.25 MG: 1 INJECTION, SOLUTION INTRAMUSCULAR; INTRAVENOUS; SUBCUTANEOUS at 08:56

## 2021-12-21 RX ADMIN — HYDROXYCHLOROQUINE SULFATE 200 MG: 200 TABLET, FILM COATED ORAL at 05:21

## 2021-12-21 RX ADMIN — HYDROMORPHONE HYDROCHLORIDE 0.25 MG: 1 INJECTION, SOLUTION INTRAMUSCULAR; INTRAVENOUS; SUBCUTANEOUS at 02:59

## 2021-12-21 RX ADMIN — HYDROMORPHONE HYDROCHLORIDE 0.25 MG: 1 INJECTION, SOLUTION INTRAMUSCULAR; INTRAVENOUS; SUBCUTANEOUS at 05:52

## 2021-12-21 RX ADMIN — HYDROMORPHONE HYDROCHLORIDE 0.25 MG: 1 INJECTION, SOLUTION INTRAMUSCULAR; INTRAVENOUS; SUBCUTANEOUS at 12:49

## 2021-12-21 ASSESSMENT — PAIN DESCRIPTION - PAIN TYPE
TYPE: ACUTE PAIN;CHRONIC PAIN
TYPE: ACUTE PAIN
TYPE: ACUTE PAIN;CHRONIC PAIN

## 2021-12-21 NOTE — DOCUMENTATION QUERY
[facility]                                                                       Query Response Note      PATIENT:               CASE REYNOSO  ACCT #:                  [id number]  MRN:                     [id number]  :                      1995  ADMIT DATE:       2021 2:00 AM  DISCH DATE:        2021 1:35 PM  RESPONDING  PROVIDER #:        [id number]           QUERY TEXT:    Anemia requiring transfusion is documented in the Medical Record. Please specify the underlying cause of the anemia.    NOTE:  If the appropriate response is not listed below, please respond with a new note.              The patient's Clinical Indicators include:  26-year-old female with a long-term history of systemic lupus erythematosus, lupus nephritis with   end-stage renal disease, on hemodialysis ( 3xweekly)   Admitted to the hospital last night with complaints of nausea, vomiting, and vomiting blood.    Findings:  Hgb 6.1->5.5  hct 20.8    Treatment:  Transfusion of irradiated PRBC's x 2    Risk;  Patient's significant co-morbid history     Thank you,  AMARJIT Glez, RN  Clinical    connect via [employer]  [phone]  [email or web address]  Options provided:   -- Blood loss anemia, acute   -- Blood loss anemia, chronic   -- Chronic anemia   -- Due to/in/with antineoplastic chemotherapy   -- Due to/in/with chronic kidney disease   -- Due to/in/with kidney failure   -- Due to/in/with neoplastic disease   -- Iron deficiency anemia   -- Macrocytic anemia   -- Microcytic anemia   -- Normocytic anemia   -- Postoperative blood loss anemia   -- Pernicious anemia   -- Unable to determine      Query created by: Jennifer Ferrer on 2021 12:52 PM    RESPONSE TEXT:    Blood loss anemia, chronic          Electronically signed by:  KAMI LONGO MD 2021 3:02 PM

## 2021-12-21 NOTE — DISCHARGE PLANNING
Anticipated Discharge Disposition: Home    Action: LSW completed chart review. Discussed pt in morning rounds. Per MD, pt is likely clear to d/c today. No SW needs reported or identified at this time.    Barriers to Discharge: None    Plan: LSW to follow and assist as needed.    Care Transition Team Assessment    Information Source  Orientation Level: Oriented X4  Information Given By:  (chart review)  Who is responsible for making decisions for patient? : Patient         Elopement Risk  Legal Hold: No  Ambulatory or Self Mobile in Wheelchair: Yes  Disoriented: No  Psychiatric Symptoms: None  History of Wandering: No  Elopement this Admit: No  Vocalizing Wanting to Leave: No  Displays Behaviors, Body Language Wanting to Leave: No-Not at Risk for Elopement  Elopement Risk: Not at Risk for Elopement    Interdisciplinary Discharge Planning  Primary Care Physician: Ella Matthew MD  Lives with - Patient's Self Care Capacity: Parents  Patient or legal guardian wants to designate a caregiver: No  Support Systems: Parent,Family Member(s)  Housing / Facility: [address]  Patient Prefers to be Discharged to:: Home  Durable Medical Equipment: Home Oxygen  DME Provider / Phone: Preffered    Discharge Preparedness  What is your plan after discharge?: Home with help  What are your discharge supports?: Parent  Prior Functional Level: Ambulatory  Difficulity with ADLs: None  Difficulity with IADLs: None    Functional Assesment  Prior Functional Level: Ambulatory    Finances  Financial Barriers to Discharge: No    Vision / Hearing Impairment  Vision Impairment : Yes  Right Eye Vision: Impaired,Wears Glasses  Left Eye Vision: Impaired,Wears Glasses  Hearing Impairment : No         Advance Directive  Advance Directive?: None    Domestic Abuse  Have you ever been the victim of abuse or violence?: No  Physical Abuse or Sexual Abuse: No  Verbal Abuse or Emotional Abuse: No  Possible Abuse/Neglect Reported to:: Not  Applicable    Psychological Assessment  History of Substance Abuse: None    Discharge Risks or Barriers  Discharge risks or barriers?: No    Anticipated Discharge Information  Discharge Disposition: Discharged to home/self care (01)

## 2021-12-21 NOTE — PROGRESS NOTES
Dr Pierre spoke with Pt at bedside and wrote discharge orders. Pt educated on the importance of attending follow up appointments and taking medications as prescribed. Pt verbalized understanding of instructions with no further questions at this time. Pt escorted off the unit by transport via wheelchair with belongings in hand.     Telemetry Shift Summary     RHYTHM: SB/SR  HR RANGE: 59-70  ECTOPY: r PVC  MEASUREMENTS: 0.16/0.08/0.40  Measurements for strip printed 12/21/21 at 12:59:36  Normal Measurements  Rhythm: SR  HR RANGE:   Measurements: 0.12-0.20/0.06-0.10/0.30-0.52      Tele strips reviewed and placed in chart

## 2021-12-21 NOTE — PROGRESS NOTES
Report given to MICHELLE Joseph. Patient resting comfortably in bed at time of report. Patient A&O4, VSS. Patient handed off in stable condition.   Care relinquished.

## 2021-12-21 NOTE — DISCHARGE SUMMARY
"Discharge Summary    CHIEF COMPLAINT ON ADMISSION  Chief Complaint   Patient presents with   • N/V     pt presents to the ed via [facility] from home for n/v x 1 hour       Reason for Admission  EMS     Admission Date  12/20/2021    CODE STATUS  Full Code    HPI & HOSPITAL COURSE  Per notes, \"26 y.o. female is past medical history of lupus on mycophenolate, Plaquenil, prednisone, end-stage renal disease on hemodialysis Monday Wednesday Friday, seizure disorder, hypertension, GERD, prior history of GI bleed, anemia requiring blood transfusions, chronic hypoxia on 2 to 3 L, who presented 12/20/2021 with complaint of near syncopal episode that happened when she got up to go to the bathroom, as well as upper and mid sharp abdominal pain, nausea, 2 episodes of vomiting with a small amount of bright blood.  She did have bowel movement which was brown, solid without blood.  She still produces urine.  Patient denies taking NSAID and she is not on anticoagulants..  In ER patient found to have hemoglobin 6.1.  4 units of irradiated RBC transfusion was ordered. \"    Patient was admitted and transfused 2 units RBCs.  She underwent hemodialysis on 12/21, without complications.  Recommended patient continue to follow-up with nephrology and PCP in the outpatient setting.    Therefore, she is discharged in good and stable condition to home with close outpatient follow-up.    The patient recovered much more quickly than anticipated on admission.    Discharge Date  12/21/2021    FOLLOW UP ITEMS POST DISCHARGE  FU with pcp    DISCHARGE DIAGNOSES  Principal Problem:    Anemia requiring transfusions POA: Yes  Active Problems:    ESRD (end stage renal disease) on dialysis (HCC) POA: Yes    HTN (hypertension) POA: Yes      Overview: \"Controlled with medication\"    Lupus (HCC) POA: Yes    Upper GI bleed POA: Unknown    Chronic respiratory failure with hypoxia (HCC) POA: Yes    AP (abdominal pain) POA: Unknown    Anemia POA: Yes  Resolved " Problems:    * No resolved hospital problems. *      FOLLOW UP  Future Appointments   Date Time Provider Department Center   12/26/2021  5:30 PM [facility] [address]   12/28/2021  7:30 AM [facility] [address]   1/9/2022  7:30 AM [facility] [address]   1/11/2022  7:30 AM [facility] [address]   1/23/2022  7:30 AM [facility] [address]   1/25/2022  7:45 AM [facility] [address]     Ella Matthew M.D.  [address] [facility] [address]  [phone]      Please call your primary care provider to schedule a hospital follow up. Thank you.      MEDICATIONS ON DISCHARGE     Medication List      CONTINUE taking these medications      Instructions   acetaminophen 500 MG Tabs  Commonly known as: TYLENOL   Take 1,000 mg by mouth every 6 hours as needed for Moderate Pain.  Dose: 1,000 mg     amLODIPine 5 MG Tabs  Commonly known as: NORVASC   Take 5 mg by mouth every day.  Dose: 5 mg     atenolol 25 MG Tabs  Commonly known as: TENORMIN   Take 25 mg by mouth every day.  Dose: 25 mg     cloNIDine 0.2 MG/24HR Ptwk patch  Commonly known as: CATAPRES   Place 1 Patch on the skin every 7 days. On Sat  Dose: 1 Patch     hydroxychloroquine 200 MG Tabs  Commonly known as: Plaquenil   Take 200 mg by mouth every day.  Dose: 200 mg     mycophenolate sodium 360 MG Tbec tablet  Commonly known as: MYFORTIC   Take 360 mg by mouth every evening.  Dose: 360 mg     pantoprazole 40 MG Tbec  Commonly known as: PROTONIX   Take 1 tablet by mouth 2 times a day.  Dose: 40 mg     predniSONE 5 MG Tabs  Commonly known as: DELTASONE   Take 5 mg by mouth every evening.  Dose: 5 mg            Allergies  Allergies   Allergen Reactions   • Cephalexin Rash     Nausea and rash    • Clindamycin Rash     Nausea and rash      • Methylprednisolone      Anxious   • Metoprolol Rash     Nausea and rash      • Fentanyl And Related Anxiety   • Maxipime [Cefepime] Itching   • Norco  [Hydrocodone-Acetaminophen] Rash and Nausea     Generalized rash   • Reglan [Metoclopramide] Anxiety   • Tape Rash     Paper tape is ok       DIET  Orders Placed This Encounter   Procedures   • Diet Order Diet: Clear Liquid; Miscellaneous modifications: (optional): No Red     Standing Status:   Standing     Number of Occurrences:   1     Order Specific Question:   Diet:     Answer:   Clear Liquid [10]     Order Specific Question:   Miscellaneous modifications: (optional)     Answer:   No Red [61]       ACTIVITY  As tolerated.  Weight bearing as tolerated    CONSULTATIONS  Nephrology    PROCEDURES  None    LABORATORY  Lab Results   Component Value Date    SODIUM 133 (L) 12/21/2021    POTASSIUM 4.7 12/21/2021    CHLORIDE 95 (L) 12/21/2021    CO2 27 12/21/2021    GLUCOSE 80 12/21/2021    BUN 28 (H) 12/21/2021    CREATININE 4.72 (H) 12/21/2021        Lab Results   Component Value Date    WBC 4.4 (L) 12/21/2021    HEMOGLOBIN 9.0 (L) 12/21/2021    HEMATOCRIT 29.0 (L) 12/21/2021    PLATELETCT 75 (L) 12/21/2021        Total time of the discharge process exceeds 35 minutes.

## 2021-12-21 NOTE — PROCEDURES
Nephrology/Hemodialysis note    Patient with ESRD/HD admitted with anemia/GIB  Feels better  No N/V  Seen and examined during dialysis treatment  Tolerates well  VS stable  Please see dialysis flow sheet for details

## 2021-12-21 NOTE — PROGRESS NOTES
Report received from MICHELLE Joseph. Pt sleeping at the time of report. Plan of care discussed at bedside. White board updated. Safety precautions in place and call light within reach. No needs at this time.

## 2021-12-21 NOTE — PROGRESS NOTES
Telemetry Shift Summary    Rhythm SR/SB  HR Range 58-76  Ectopy rPVC  Measurements 0.16/0.08/0.36        Normal Values  Rhythm SR  HR Range    Measurements 0.12-0.20 / 0.06-0.10  / 0.30-0.52

## 2021-12-21 NOTE — DIETARY
"Nutrition services: Day 1 of admit.  Lily Nicole is a 26 y.o. female with admitting DX of Anemia requiring transfusions    Consult received for low BMI      Assessment:  Height: 170.2 cm (5' 7\")  Weight: 55 kg (121 lb 4.1 oz)  Body mass index is 18.99 kg/m²., BMI classification: WNL   Diet/Intake: clear liquids, no red    Evaluation:   1. Pt came to hospital due to 2 episodes of vomiting with small amount of bright red blood. Pt with very low Hemoglobin with blood transfusion. Plan for additional transfusion today during dialysis.   2. DX includes abdominal pain w/ x-ray showing moderate constipation, chronic respiratory failure w/ hypoxia, hx of upper GI bleed, Lupus, HTN and ESRD w/ dialysis on Monday, Wednesday and Friday.   3. Pt currently on clear liquid diet x 1 day due to possible GI bleed. No documented intake of diet yet. No GI consult yet because there has been no further hematemesis.   4. Wt hx reviewed in Epic. Weight has been stable since August. Insignificant wt loss of 10% noted x 1 year. No report of wt loss or poor PO PTA per nutrition screen    Malnutrition Risk: criteria not met    Recommendations/Plan:  1. Advance diet beyond clear liquids when medically feasible.    2. Encourage intake of >50%  3. Document intake of all meals as % taken in ADL's to provide interdisciplinary communication across all shifts.   4. Monitor weight.  5. Nutrition rep will continue to see patient for ongoing meal and snack preferences.     RD will follow.  "

## 2021-12-21 NOTE — PROGRESS NOTES
Hemodialysis done today on PUF mode , started @ 0904 and ended @ 1104 with net UF= 2000ml, Report given to MICHELLE Romero. See flow sheet for details

## 2021-12-21 NOTE — DISCHARGE INSTRUCTIONS
Anemia    Anemia is a condition in which you do not have enough red blood cells or hemoglobin. Hemoglobin is a substance in red blood cells that carries oxygen. When you do not have enough red blood cells or hemoglobin (are anemic), your body cannot get enough oxygen and your organs may not work properly. As a result, you may feel very tired or have other problems.  What are the causes?  Common causes of anemia include:  · Excessive bleeding. Anemia can be caused by excessive bleeding inside or outside the body, including bleeding from the intestine or from periods in women.  · Poor nutrition.  · Long-lasting (chronic) kidney, thyroid, and liver disease.  · Bone marrow disorders.  · Cancer and treatments for cancer.  · HIV (human immunodeficiency virus) and AIDS (acquired immunodeficiency syndrome).  · Treatments for HIV and AIDS.  · Spleen problems.  · Blood disorders.  · Infections, medicines, and autoimmune disorders that destroy red blood cells.  What are the signs or symptoms?  Symptoms of this condition include:  · Minor weakness.  · Dizziness.  · Headache.  · Feeling heartbeats that are irregular or faster than normal (palpitations).  · Shortness of breath, especially with exercise.  · Paleness.  · Cold sensitivity.  · Indigestion.  · Nausea.  · Difficulty sleeping.  · Difficulty concentrating.  Symptoms may occur suddenly or develop slowly. If your anemia is mild, you may not have symptoms.  How is this diagnosed?  This condition is diagnosed based on:  · Blood tests.  · Your medical history.  · A physical exam.  · Bone marrow biopsy.  Your health care provider may also check your stool (feces) for blood and may do additional testing to look for the cause of your bleeding.  You may also have other tests, including:  · Imaging tests, such as a CT scan or MRI.  · Endoscopy.  · Colonoscopy.  How is this treated?  Treatment for this condition depends on the cause. If you continue to lose a lot of blood, you may  need to be treated at a hospital. Treatment may include:  · Taking supplements of iron, vitamin B12, or folic acid.  · Taking a hormone medicine (erythropoietin) that can help to stimulate red blood cell growth.  · Having a blood transfusion. This may be needed if you lose a lot of blood.  · Making changes to your diet.  · Having surgery to remove your spleen.  Follow these instructions at home:  · Take over-the-counter and prescription medicines only as told by your health care provider.  · Take supplements only as told by your health care provider.  · Follow any diet instructions that you were given.  · Keep all follow-up visits as told by your health care provider. This is important.  Contact a health care provider if:  · You develop new bleeding anywhere in the body.  Get help right away if:  · You are very weak.  · You are short of breath.  · You have pain in your abdomen or chest.  · You are dizzy or feel faint.  · You have trouble concentrating.  · You have bloody or black, tarry stools.  · You vomit repeatedly or you vomit up blood.  Summary  · Anemia is a condition in which you do not have enough red blood cells or enough of a substance in your red blood cells that carries oxygen (hemoglobin).  · Symptoms may occur suddenly or develop slowly.  · If your anemia is mild, you may not have symptoms.  · This condition is diagnosed with blood tests as well as a medical history and physical exam. Other tests may be needed.  · Treatment for this condition depends on the cause of the anemia.  This information is not intended to replace advice given to you by your health care provider. Make sure you discuss any questions you have with your health care provider.  Document Released: 01/25/2006 Document Revised: 11/30/2018 Document Reviewed: 01/19/2018  [employer] Patient Education © 2020 [employer]    The purpose of blood transfusion is to correct anemia, low levels of blood clotting factors or to correct acute blood  loss.   Blood transfusion is very safe but occasionally unexpected adverse reactions do occur. Most adverse reactions occur during transfusion, within one to two days following transfusion or one to two weeks following transfusion. Some adverse reactions can occur one to six months after transfusion and some even years later.             If any of the symptoms listed below happen to you during your transfusion,                                 please notify your nurse immediately.   · Fever or Chills  · Flushing of the Face  · Hives, rash or itching  · Difficulty in breathing or shortness of breath  · Pain or oozing of blood from the IV needle site  · Low back pain  · Nausea or vomiting  · Weakness or fainting  · Chest pain  · Blood in the urine  · Decreased frequency of urination    The above symptoms may also occur within 24 to 48 after transfusion; if so, notify your physician.     · Yellowing of the skin    This can happen one to six months after transfusion; if so, notify your physician    Discharge Instructions    Discharged to home by car with relative. Discharged via wheelchair, hospital escort: Yes.  Special equipment needed: Not Applicable    Be sure to schedule a follow-up appointment with your primary care doctor or any specialists as instructed.     Discharge Plan:   Diet Plan: Discussed  Activity Level: Discussed  Confirmed Follow up Appointment: Patient to Call and Schedule Appointment  Confirmed Symptoms Management: Discussed  Medication Reconciliation Updated: Yes  Influenza Vaccine Indication: Not indicated: Previously immunized this influenza season and > 8 years of age    I understand that a diet low in cholesterol, fat, and sodium is recommended for good health. Unless I have been given specific instructions below for another diet, I accept this instruction as my diet prescription.   Other diet: as tolerated    Special Instructions: None    · Is patient discharged on Warfarin / Coumadin?   No      Depression / Suicide Risk    As you are discharged from this [facility] facility, it is important to learn how to keep safe from harming yourself.    Recognize the warning signs:  · Abrupt changes in personality, positive or negative- including increase in energy   · Giving away possessions  · Change in eating patterns- significant weight changes-  positive or negative  · Change in sleeping patterns- unable to sleep or sleeping all the time   · Unwillingness or inability to communicate  · Depression  · Unusual sadness, discouragement and loneliness  · Talk of wanting to die  · Neglect of personal appearance   · Rebelliousness- reckless behavior  · Withdrawal from people/activities they love  · Confusion- inability to concentrate     If you or a loved one observes any of these behaviors or has concerns about self-harm, here's what you can do:  · Talk about it- your feelings and reasons for harming yourself  · Remove any means that you might use to hurt yourself (examples: pills, rope, extension cords, firearm)  · Get professional help from the community (Mental Health, Substance Abuse, psychological counseling)  · Do not be alone:Call your Safe Contact- someone whom you trust who will be there for you.  · Call your local CRISIS HOTLINE 192-9911 or 548-856-5056  · Call your local Children's Mobile Crisis Response Team [place] (295) 242-5101 or www.Farmol  · Call the toll free National Suicide Prevention Hotlines   · National Suicide Prevention Lifeline 559-389-ISDK (2589)  · National Hope Line Network 800-SUICIDE (433-4852)

## 2021-12-21 NOTE — CONSULTS
DATE OF SERVICE:  12/20/2021     NEPHROLOGY CONSULTATION     REQUESTING PHYSICIAN:  Alix Huntley DO     REASON FOR CONSULTATION:  To evaluate and provide dialysis for patient with   end-stage renal disease.     HISTORY OF PRESENT ILLNESS:  The patient is a 26-year-old female with a   long-term history of systemic lupus erythematosus, lupus nephritis with   end-stage renal disease, on hemodialysis, who has been receiving her dialysis   treatments on Monday, Wednesday, and Friday in the [facility]   [facility], admitted to the hospital last night with complaints of nausea, vomiting,   and vomiting blood.  Currently, the patient is doing better, undergoing   dialysis, tolerates well, scheduled for blood transfusion.  Nausea, improved   with Zofran.  No vomiting. Mild shortness of breath.     REVIEW OF SYSTEMS:  GENERAL:  Positive for fatigue.  No fever or chills.  HENT:  No sinus pain, no sore throat, no nasal congestion.  EYES:  No double or blurry vision, no eye pain.  NECK:  No pain, no stiffness.  RESPIRATORY:  Positive for shortness of breath. No cough, no hemoptysis.  CARDIOVASCULAR:  Positive for dyspnea on exertion.  No edema, no palpitation.  GASTROINTESTINAL:  No abdominal pain.  Positive for nausea and vomiting,   improved.  No diarrhea.     All other systems reviewed negative.     PAST MEDICAL HISTORY:  End-stage renal disease, on hemodialysis; lupus   nephritis; systemic lupus erythematosus; hypertension; gastritis.     PAST SURGICAL HISTORY:  Bronchoscopy, endoscopy, AV fistula creation.     FAMILY HISTORY:  Diabetes mellitus type 2 in grandmother.     SOCIAL HISTORY:  The patient is single, lives with family.  No tobacco,   alcohol or drug use.     ALLERGIES:  ALLERGIC TO CEPHALEXIN, CLINDAMYCIN, METHYLPREDNISOLONE,   METOPROLOL, ____, NORCO, REGLAN.     PHYSICAL EXAMINATION:  VITAL SIGNS:  Blood pressure 126/79, heart rate 74, temperature 36.7 Celsius.  GENERAL APPEARANCE:  Well-developed,  well-nourished female in no acute   distress.  HEENT:  Normocephalic, atraumatic.  Pupils equal, round, reactive to light.    Extraocular movement intact.  Nares patent.  Oropharynx clear, moist mucosa.    No erythema or exudate.  NECK:  Supple.  No lymphadenopathy, no thyromegaly appreciated.  LUNGS:  Bibasilar crackles.  No rhonchi, no wheezes.  Good effort.  HEART:  Regular rhythm. No rub or gallop.  ABDOMEN:  Soft, nontender, nondistended.  Bowel sounds present.  No palpable   mass.  EXTREMITIES:  No cyanosis, no clubbing, no edema.  SKIN:  No rash, no pruritus.  NEUROLOGIC:  Alert, oriented x3, no focal deficit.  Cranial nerves II-XII   grossly intact.     LABORATORY DATA:  Reviewed, revealed hemoglobin level 5.5.  Sodium 131,   potassium 5.1, BUN 28 and creatinine 7.5.     ASSESSMENT AND PLAN:  The patient is a very pleasant 26-year-old female with   multiple medical problems, lupus flareup, end-stage renal disease, on   hemodialysis, admitted with nausea, vomiting, gastrointestinal bleeding.  1.  End-stage renal disease.  To continue dialysis per patient's schedule   Monday, Wednesday and Friday.  2.  Electrolytes. Mild hyponatremia, should improve with volume removal.  3.  Volume.  This patient is scheduled to receive blood transfusion.  We will   schedule additional treatment with ____ ultrafiltration tomorrow.  4.  Hypertension.  Blood pressure remains well controlled.  5.  Anemia. Scheduled blood transfusion.  Consider GI evaluation, endoscopy.     RECOMMENDATIONS:  1.  Hemodialysis Monday, Wednesday and Friday.  2.  Ultrafiltration tomorrow.  3.  Monitor hemoglobin level, basic metabolic panel.  4.  Adjust all medications to renal doses.  5.  We will follow the patient closely.     Thank you for the consult.        ______________________________  MD ROSANNA MOREIRA/AYANNA/NAZANIN    DD:  12/20/2021 13:21  DT:  12/20/2021 17:09    Job#:  [id number]

## 2021-12-21 NOTE — PROGRESS NOTES
Telemetry Shift Summary    Rhythm SR  HR Range 63-82  Ectopy Rare PVCs  Measurements 0.16/0.08/0.36  New admit      Normal Values  Rhythm SR  HR Range    Measurements 0.12-0.20 / 0.06-0.10  / 0.30-0.52

## 2021-12-26 ENCOUNTER — OUTPATIENT INFUSION SERVICES (OUTPATIENT)
Dept: ONCOLOGY | Facility: MEDICAL CENTER | Age: 26
End: 2021-12-26
Attending: INTERNAL MEDICINE
Payer: COMMERCIAL

## 2021-12-26 DIAGNOSIS — D64.9 SYMPTOMATIC ANEMIA: ICD-10-CM

## 2021-12-26 LAB
ANISOCYTOSIS BLD QL SMEAR: ABNORMAL
BASOPHILS # BLD AUTO: 0.4 % (ref 0–1.8)
BASOPHILS # BLD: 0.02 K/UL (ref 0–0.12)
COMMENT 1642: NORMAL
EOSINOPHIL # BLD AUTO: 0.06 K/UL (ref 0–0.51)
EOSINOPHIL NFR BLD: 1.3 % (ref 0–6.9)
ERYTHROCYTE [DISTWIDTH] IN BLOOD BY AUTOMATED COUNT: 60.1 FL (ref 35.9–50)
HCT VFR BLD AUTO: 28.4 % (ref 37–47)
HGB BLD-MCNC: 8.4 G/DL (ref 12–16)
IMM GRANULOCYTES # BLD AUTO: 0.03 K/UL (ref 0–0.11)
IMM GRANULOCYTES NFR BLD AUTO: 0.6 % (ref 0–0.9)
LYMPHOCYTES # BLD AUTO: 0.49 K/UL (ref 1–4.8)
LYMPHOCYTES NFR BLD: 10.2 % (ref 22–41)
MACROCYTES BLD QL SMEAR: ABNORMAL
MCH RBC QN AUTO: 26.9 PG (ref 27–33)
MCHC RBC AUTO-ENTMCNC: 29.6 G/DL (ref 33.6–35)
MCV RBC AUTO: 91 FL (ref 81.4–97.8)
MONOCYTES # BLD AUTO: 0.3 K/UL (ref 0–0.85)
MONOCYTES NFR BLD AUTO: 6.3 % (ref 0–13.4)
MORPHOLOGY BLD-IMP: NORMAL
NEUTROPHILS # BLD AUTO: 3.89 K/UL (ref 2–7.15)
NEUTROPHILS NFR BLD: 81.2 % (ref 44–72)
NRBC # BLD AUTO: 0 K/UL
NRBC BLD-RTO: 0 /100 WBC
OVALOCYTES BLD QL SMEAR: NORMAL
PLATELET # BLD AUTO: 87 K/UL (ref 164–446)
PLATELET BLD QL SMEAR: NORMAL
PMV BLD AUTO: 12.2 FL (ref 9–12.9)
POIKILOCYTOSIS BLD QL SMEAR: NORMAL
POLYCHROMASIA BLD QL SMEAR: NORMAL
RBC # BLD AUTO: 3.12 M/UL (ref 4.2–5.4)
RBC BLD AUTO: PRESENT
WBC # BLD AUTO: 4.8 K/UL (ref 4.8–10.8)

## 2021-12-26 PROCEDURE — 85025 COMPLETE CBC W/AUTO DIFF WBC: CPT

## 2021-12-26 PROCEDURE — 36415 COLL VENOUS BLD VENIPUNCTURE: CPT

## 2021-12-26 RX ORDER — DIPHENHYDRAMINE HYDROCHLORIDE 50 MG/ML
25 INJECTION INTRAMUSCULAR; INTRAVENOUS PRN
Status: CANCELLED | OUTPATIENT
Start: 2021-12-26

## 2021-12-26 RX ORDER — SODIUM CHLORIDE 9 MG/ML
INJECTION, SOLUTION INTRAVENOUS CONTINUOUS
Status: CANCELLED | OUTPATIENT
Start: 2021-12-26

## 2021-12-26 RX ORDER — 0.9 % SODIUM CHLORIDE 0.9 %
VIAL (ML) INJECTION PRN
Status: CANCELLED | OUTPATIENT
Start: 2021-12-26

## 2021-12-26 RX ORDER — HEPARIN SODIUM (PORCINE) LOCK FLUSH IV SOLN 100 UNIT/ML 100 UNIT/ML
500 SOLUTION INTRAVENOUS PRN
Status: CANCELLED | OUTPATIENT
Start: 2021-12-26

## 2021-12-26 RX ORDER — 0.9 % SODIUM CHLORIDE 0.9 %
3 VIAL (ML) INJECTION PRN
Status: CANCELLED | OUTPATIENT
Start: 2021-12-26

## 2021-12-26 RX ORDER — DIPHENHYDRAMINE HCL 25 MG
25 TABLET ORAL ONCE
Status: CANCELLED | OUTPATIENT
Start: 2021-12-26 | End: 2021-12-26

## 2021-12-26 RX ORDER — ACETAMINOPHEN 325 MG/1
650 TABLET ORAL ONCE
Status: CANCELLED | OUTPATIENT
Start: 2021-12-26

## 2021-12-26 RX ORDER — 0.9 % SODIUM CHLORIDE 0.9 %
10 VIAL (ML) INJECTION PRN
Status: CANCELLED | OUTPATIENT
Start: 2021-12-26

## 2021-12-26 RX ORDER — ACETAMINOPHEN 325 MG/1
650 TABLET ORAL PRN
Status: CANCELLED | OUTPATIENT
Start: 2021-12-26

## 2021-12-27 NOTE — PROGRESS NOTES
Lily is here for CBC/COD. Labs drawn peripherally using 23 gauge butterfly; gauze/tape applied to site. Discharged to self care; no apparent distress noted. Labs reviewed; Hgb = 8.4. Lily does not meet parameters for blood transfusion. Message left for Lily regarding lab results. Upcoming appointment for blood transfusion cancelled.

## 2021-12-28 ENCOUNTER — APPOINTMENT (OUTPATIENT)
Dept: ONCOLOGY | Facility: MEDICAL CENTER | Age: 26
End: 2021-12-28
Attending: INTERNAL MEDICINE
Payer: COMMERCIAL

## 2021-12-31 ENCOUNTER — HOSPITAL ENCOUNTER (INPATIENT)
Facility: MEDICAL CENTER | Age: 26
LOS: 1 days | DRG: 811 | End: 2022-01-01
Attending: EMERGENCY MEDICINE | Admitting: HOSPITALIST
Payer: COMMERCIAL

## 2021-12-31 ENCOUNTER — APPOINTMENT (OUTPATIENT)
Dept: RADIOLOGY | Facility: MEDICAL CENTER | Age: 26
DRG: 811 | End: 2021-12-31
Attending: EMERGENCY MEDICINE
Payer: COMMERCIAL

## 2021-12-31 DIAGNOSIS — D64.9 ANEMIA REQUIRING TRANSFUSIONS: ICD-10-CM

## 2021-12-31 DIAGNOSIS — N18.5 CHRONIC RENAL FAILURE, STAGE 5 (HCC): ICD-10-CM

## 2021-12-31 DIAGNOSIS — D64.9 ACUTE ANEMIA: ICD-10-CM

## 2021-12-31 LAB
ABO GROUP BLD: NORMAL
ANION GAP SERPL CALC-SCNC: 9 MMOL/L (ref 7–16)
BARCODED ABORH UBTYP: 5100
BARCODED ABORH UBTYP: 5100
BARCODED PRD CODE UBPRD: NORMAL
BARCODED PRD CODE UBPRD: NORMAL
BARCODED UNIT NUM UBUNT: NORMAL
BARCODED UNIT NUM UBUNT: NORMAL
BASOPHILS # BLD AUTO: 0.5 % (ref 0–1.8)
BASOPHILS # BLD: 0.02 K/UL (ref 0–0.12)
BLD GP AB SCN SERPL QL: NORMAL
BUN SERPL-MCNC: 19 MG/DL (ref 8–22)
CALCIUM SERPL-MCNC: 8.7 MG/DL (ref 8.4–10.2)
CHLORIDE SERPL-SCNC: 95 MMOL/L (ref 96–112)
CO2 SERPL-SCNC: 32 MMOL/L (ref 20–33)
COMPONENT R 8504R: NORMAL
COMPONENT R 8504R: NORMAL
CREAT SERPL-MCNC: 3.32 MG/DL (ref 0.5–1.4)
EOSINOPHIL # BLD AUTO: 0.01 K/UL (ref 0–0.51)
EOSINOPHIL NFR BLD: 0.3 % (ref 0–6.9)
ERYTHROCYTE [DISTWIDTH] IN BLOOD BY AUTOMATED COUNT: 61.7 FL (ref 35.9–50)
FERRITIN SERPL-MCNC: 823 NG/ML (ref 10–291)
GLUCOSE SERPL-MCNC: 96 MG/DL (ref 65–99)
HCT VFR BLD AUTO: 22.7 % (ref 37–47)
HGB BLD-MCNC: 6.6 G/DL (ref 12–16)
IMM GRANULOCYTES # BLD AUTO: 0.04 K/UL (ref 0–0.11)
IMM GRANULOCYTES NFR BLD AUTO: 1 % (ref 0–0.9)
IRON SATN MFR SERPL: 15 % (ref 15–55)
IRON SERPL-MCNC: 22 UG/DL (ref 40–170)
LYMPHOCYTES # BLD AUTO: 0.34 K/UL (ref 1–4.8)
LYMPHOCYTES NFR BLD: 8.8 % (ref 22–41)
MCH RBC QN AUTO: 27.2 PG (ref 27–33)
MCHC RBC AUTO-ENTMCNC: 29.1 G/DL (ref 33.6–35)
MCV RBC AUTO: 93.4 FL (ref 81.4–97.8)
MONOCYTES # BLD AUTO: 0.25 K/UL (ref 0–0.85)
MONOCYTES NFR BLD AUTO: 6.5 % (ref 0–13.4)
NEUTROPHILS # BLD AUTO: 3.19 K/UL (ref 2–7.15)
NEUTROPHILS NFR BLD: 82.9 % (ref 44–72)
NRBC # BLD AUTO: 0 K/UL
NRBC BLD-RTO: 0 /100 WBC
PLATELET # BLD AUTO: 91 K/UL (ref 164–446)
PMV BLD AUTO: 11.3 FL (ref 9–12.9)
POTASSIUM SERPL-SCNC: 4.6 MMOL/L (ref 3.6–5.5)
PRODUCT TYPE UPROD: NORMAL
PRODUCT TYPE UPROD: NORMAL
RBC # BLD AUTO: 2.43 M/UL (ref 4.2–5.4)
RH BLD: NORMAL
SODIUM SERPL-SCNC: 136 MMOL/L (ref 135–145)
TIBC SERPL-MCNC: 142 UG/DL (ref 250–450)
UIBC SERPL-MCNC: 120 UG/DL (ref 110–370)
UNIT STATUS USTAT: NORMAL
UNIT STATUS USTAT: NORMAL
WBC # BLD AUTO: 3.9 K/UL (ref 4.8–10.8)

## 2021-12-31 PROCEDURE — 99285 EMERGENCY DEPT VISIT HI MDM: CPT

## 2021-12-31 PROCEDURE — 700111 HCHG RX REV CODE 636 W/ 250 OVERRIDE (IP): Performed by: EMERGENCY MEDICINE

## 2021-12-31 PROCEDURE — 83550 IRON BINDING TEST: CPT

## 2021-12-31 PROCEDURE — 36415 COLL VENOUS BLD VENIPUNCTURE: CPT

## 2021-12-31 PROCEDURE — 85025 COMPLETE CBC W/AUTO DIFF WBC: CPT

## 2021-12-31 PROCEDURE — 86901 BLOOD TYPING SEROLOGIC RH(D): CPT

## 2021-12-31 PROCEDURE — 700111 HCHG RX REV CODE 636 W/ 250 OVERRIDE (IP): Performed by: HOSPITALIST

## 2021-12-31 PROCEDURE — 80048 BASIC METABOLIC PNL TOTAL CA: CPT

## 2021-12-31 PROCEDURE — 86902 BLOOD TYPE ANTIGEN DONOR EA: CPT | Mod: 91

## 2021-12-31 PROCEDURE — 86922 COMPATIBILITY TEST ANTIGLOB: CPT | Mod: 91

## 2021-12-31 PROCEDURE — 86850 RBC ANTIBODY SCREEN: CPT

## 2021-12-31 PROCEDURE — 82728 ASSAY OF FERRITIN: CPT

## 2021-12-31 PROCEDURE — 700102 HCHG RX REV CODE 250 W/ 637 OVERRIDE(OP): Performed by: HOSPITALIST

## 2021-12-31 PROCEDURE — 96372 THER/PROPH/DIAG INJ SC/IM: CPT

## 2021-12-31 PROCEDURE — 96374 THER/PROPH/DIAG INJ IV PUSH: CPT

## 2021-12-31 PROCEDURE — 99223 1ST HOSP IP/OBS HIGH 75: CPT | Performed by: HOSPITALIST

## 2021-12-31 PROCEDURE — 86900 BLOOD TYPING SEROLOGIC ABO: CPT

## 2021-12-31 PROCEDURE — 770006 HCHG ROOM/CARE - MED/SURG/GYN SEMI*

## 2021-12-31 PROCEDURE — 83540 ASSAY OF IRON: CPT

## 2021-12-31 PROCEDURE — 96375 TX/PRO/DX INJ NEW DRUG ADDON: CPT

## 2021-12-31 PROCEDURE — 70450 CT HEAD/BRAIN W/O DYE: CPT

## 2021-12-31 PROCEDURE — A9270 NON-COVERED ITEM OR SERVICE: HCPCS | Performed by: HOSPITALIST

## 2021-12-31 RX ORDER — ONDANSETRON 4 MG/1
4 TABLET, ORALLY DISINTEGRATING ORAL EVERY 4 HOURS PRN
Status: DISCONTINUED | OUTPATIENT
Start: 2021-12-31 | End: 2022-01-01 | Stop reason: HOSPADM

## 2021-12-31 RX ORDER — CLONIDINE 0.2 MG/24H
1 PATCH, EXTENDED RELEASE TRANSDERMAL
Status: DISCONTINUED | OUTPATIENT
Start: 2022-01-04 | End: 2022-01-01 | Stop reason: HOSPADM

## 2021-12-31 RX ORDER — OMEPRAZOLE 20 MG/1
20 CAPSULE, DELAYED RELEASE ORAL 2 TIMES DAILY
Status: DISCONTINUED | OUTPATIENT
Start: 2021-12-31 | End: 2022-01-01 | Stop reason: HOSPADM

## 2021-12-31 RX ORDER — AMOXICILLIN 250 MG
2 CAPSULE ORAL 2 TIMES DAILY
Status: DISCONTINUED | OUTPATIENT
Start: 2022-01-01 | End: 2022-01-01 | Stop reason: HOSPADM

## 2021-12-31 RX ORDER — PROCHLORPERAZINE EDISYLATE 5 MG/ML
10 INJECTION INTRAMUSCULAR; INTRAVENOUS ONCE
Status: COMPLETED | OUTPATIENT
Start: 2021-12-31 | End: 2021-12-31

## 2021-12-31 RX ORDER — HEPARIN SODIUM 5000 [USP'U]/ML
5000 INJECTION, SOLUTION INTRAVENOUS; SUBCUTANEOUS EVERY 8 HOURS
Status: DISCONTINUED | OUTPATIENT
Start: 2021-12-31 | End: 2021-12-31

## 2021-12-31 RX ORDER — HYDROMORPHONE HYDROCHLORIDE 1 MG/ML
1 INJECTION, SOLUTION INTRAMUSCULAR; INTRAVENOUS; SUBCUTANEOUS ONCE
Status: COMPLETED | OUTPATIENT
Start: 2021-12-31 | End: 2021-12-31

## 2021-12-31 RX ORDER — AMLODIPINE BESYLATE 5 MG/1
5 TABLET ORAL DAILY
Status: DISCONTINUED | OUTPATIENT
Start: 2022-01-01 | End: 2022-01-01 | Stop reason: HOSPADM

## 2021-12-31 RX ORDER — DIPHENHYDRAMINE HYDROCHLORIDE 50 MG/ML
25 INJECTION INTRAMUSCULAR; INTRAVENOUS
Status: COMPLETED | OUTPATIENT
Start: 2021-12-31 | End: 2022-01-01

## 2021-12-31 RX ORDER — HYDROXYCHLOROQUINE SULFATE 200 MG/1
200 TABLET, FILM COATED ORAL DAILY
Status: DISCONTINUED | OUTPATIENT
Start: 2022-01-01 | End: 2022-01-01 | Stop reason: HOSPADM

## 2021-12-31 RX ORDER — ONDANSETRON 2 MG/ML
4 INJECTION INTRAMUSCULAR; INTRAVENOUS EVERY 4 HOURS PRN
Status: DISCONTINUED | OUTPATIENT
Start: 2021-12-31 | End: 2022-01-01 | Stop reason: HOSPADM

## 2021-12-31 RX ORDER — ACETAMINOPHEN 650 MG/1
650 SUPPOSITORY RECTAL
Status: COMPLETED | OUTPATIENT
Start: 2021-12-31 | End: 2022-01-01

## 2021-12-31 RX ORDER — ATENOLOL 25 MG/1
25 TABLET ORAL DAILY
Status: DISCONTINUED | OUTPATIENT
Start: 2022-01-01 | End: 2022-01-01 | Stop reason: HOSPADM

## 2021-12-31 RX ORDER — BISACODYL 10 MG
10 SUPPOSITORY, RECTAL RECTAL
Status: DISCONTINUED | OUTPATIENT
Start: 2021-12-31 | End: 2022-01-01 | Stop reason: HOSPADM

## 2021-12-31 RX ORDER — SUMATRIPTAN 6 MG/.5ML
6 INJECTION, SOLUTION SUBCUTANEOUS ONCE
Status: COMPLETED | OUTPATIENT
Start: 2021-12-31 | End: 2021-12-31

## 2021-12-31 RX ORDER — PROCHLORPERAZINE EDISYLATE 5 MG/ML
5-10 INJECTION INTRAMUSCULAR; INTRAVENOUS EVERY 4 HOURS PRN
Status: DISCONTINUED | OUTPATIENT
Start: 2021-12-31 | End: 2022-01-01 | Stop reason: HOSPADM

## 2021-12-31 RX ORDER — MYCOPHENOLATE MOFETIL 250 MG/1
500 CAPSULE ORAL EVERY EVENING
Status: DISCONTINUED | OUTPATIENT
Start: 2021-12-31 | End: 2022-01-01 | Stop reason: HOSPADM

## 2021-12-31 RX ORDER — PREDNISONE 5 MG/1
5 TABLET ORAL EVERY EVENING
Status: DISCONTINUED | OUTPATIENT
Start: 2021-12-31 | End: 2022-01-01 | Stop reason: HOSPADM

## 2021-12-31 RX ORDER — PROMETHAZINE HYDROCHLORIDE 25 MG/1
12.5-25 SUPPOSITORY RECTAL EVERY 4 HOURS PRN
Status: DISCONTINUED | OUTPATIENT
Start: 2021-12-31 | End: 2022-01-01 | Stop reason: HOSPADM

## 2021-12-31 RX ORDER — DIPHENHYDRAMINE HYDROCHLORIDE 50 MG/ML
25 INJECTION INTRAMUSCULAR; INTRAVENOUS ONCE
Status: COMPLETED | OUTPATIENT
Start: 2021-12-31 | End: 2021-12-31

## 2021-12-31 RX ORDER — PROMETHAZINE HYDROCHLORIDE 25 MG/1
12.5-25 TABLET ORAL EVERY 4 HOURS PRN
Status: DISCONTINUED | OUTPATIENT
Start: 2021-12-31 | End: 2022-01-01 | Stop reason: HOSPADM

## 2021-12-31 RX ORDER — POLYETHYLENE GLYCOL 3350 17 G/17G
1 POWDER, FOR SOLUTION ORAL
Status: DISCONTINUED | OUTPATIENT
Start: 2021-12-31 | End: 2022-01-01 | Stop reason: HOSPADM

## 2021-12-31 RX ADMIN — OMEPRAZOLE 20 MG: 20 CAPSULE, DELAYED RELEASE ORAL at 19:22

## 2021-12-31 RX ADMIN — HYDROMORPHONE HYDROCHLORIDE 1 MG: 1 INJECTION, SOLUTION INTRAMUSCULAR; INTRAVENOUS; SUBCUTANEOUS at 18:02

## 2021-12-31 RX ADMIN — PROCHLORPERAZINE EDISYLATE 10 MG: 5 INJECTION INTRAMUSCULAR; INTRAVENOUS at 17:04

## 2021-12-31 RX ADMIN — SUMATRIPTAN 6 MG: 6 INJECTION, SOLUTION SUBCUTANEOUS at 17:04

## 2021-12-31 RX ADMIN — MYCOPHENOLATE MOFETIL 500 MG: 250 CAPSULE ORAL at 19:22

## 2021-12-31 RX ADMIN — PREDNISONE 5 MG: 5 TABLET ORAL at 19:22

## 2021-12-31 RX ADMIN — DIPHENHYDRAMINE HYDROCHLORIDE 25 MG: 50 INJECTION INTRAMUSCULAR; INTRAVENOUS at 17:04

## 2021-12-31 ASSESSMENT — ENCOUNTER SYMPTOMS
FEVER: 0
ABDOMINAL PAIN: 0
EYE REDNESS: 0
EYE DISCHARGE: 0
FOCAL WEAKNESS: 0
FLANK PAIN: 0
SHORTNESS OF BREATH: 0
DIZZINESS: 1
STRIDOR: 0
HEADACHES: 1
COUGH: 0
CHILLS: 0
MYALGIAS: 0
BRUISES/BLEEDS EASILY: 0
NERVOUS/ANXIOUS: 0
VOMITING: 0

## 2021-12-31 ASSESSMENT — PAIN DESCRIPTION - PAIN TYPE: TYPE: ACUTE PAIN

## 2021-12-31 ASSESSMENT — FIBROSIS 4 INDEX: FIB4 SCORE: 1.91

## 2022-01-01 ENCOUNTER — TELEPHONE (OUTPATIENT)
Dept: CARDIOLOGY | Facility: MEDICAL CENTER | Age: 27
End: 2022-01-01
Payer: COMMERCIAL

## 2022-01-01 ENCOUNTER — HOSPITAL ENCOUNTER (EMERGENCY)
Facility: MEDICAL CENTER | Age: 27
End: 2022-08-31
Attending: EMERGENCY MEDICINE
Payer: COMMERCIAL

## 2022-01-01 ENCOUNTER — HOSPITAL ENCOUNTER (EMERGENCY)
Facility: MEDICAL CENTER | Age: 27
End: 2022-09-21
Attending: EMERGENCY MEDICINE
Payer: COMMERCIAL

## 2022-01-01 ENCOUNTER — APPOINTMENT (OUTPATIENT)
Dept: BEHAVIORAL HEALTH | Facility: CLINIC | Age: 27
End: 2022-01-01
Payer: COMMERCIAL

## 2022-01-01 ENCOUNTER — HOSPITAL ENCOUNTER (OUTPATIENT)
Dept: RADIOLOGY | Facility: MEDICAL CENTER | Age: 27
End: 2022-10-20
Attending: INTERNAL MEDICINE

## 2022-01-01 ENCOUNTER — TELEPHONE (OUTPATIENT)
Dept: ONCOLOGY | Facility: MEDICAL CENTER | Age: 27
End: 2022-01-01
Payer: COMMERCIAL

## 2022-01-01 ENCOUNTER — HOSPITAL ENCOUNTER (EMERGENCY)
Facility: MEDICAL CENTER | Age: 27
End: 2022-09-22
Attending: EMERGENCY MEDICINE
Payer: COMMERCIAL

## 2022-01-01 ENCOUNTER — HOSPITAL ENCOUNTER (EMERGENCY)
Facility: MEDICAL CENTER | Age: 27
End: 2022-07-22
Attending: EMERGENCY MEDICINE
Payer: COMMERCIAL

## 2022-01-01 ENCOUNTER — APPOINTMENT (OUTPATIENT)
Dept: RADIOLOGY | Facility: MEDICAL CENTER | Age: 27
End: 2022-01-01
Attending: EMERGENCY MEDICINE
Payer: COMMERCIAL

## 2022-01-01 ENCOUNTER — HOSPITAL ENCOUNTER (EMERGENCY)
Facility: MEDICAL CENTER | Age: 27
End: 2022-07-19
Attending: EMERGENCY MEDICINE
Payer: COMMERCIAL

## 2022-01-01 ENCOUNTER — OFFICE VISIT (OUTPATIENT)
Dept: BEHAVIORAL HEALTH | Facility: CLINIC | Age: 27
End: 2022-01-01
Payer: COMMERCIAL

## 2022-01-01 ENCOUNTER — ANESTHESIA (OUTPATIENT)
Dept: SURGERY | Facility: MEDICAL CENTER | Age: 27
DRG: 377 | End: 2022-01-01
Payer: COMMERCIAL

## 2022-01-01 ENCOUNTER — APPOINTMENT (OUTPATIENT)
Dept: ONCOLOGY | Facility: MEDICAL CENTER | Age: 27
End: 2022-01-01
Attending: INTERNAL MEDICINE
Payer: COMMERCIAL

## 2022-01-01 ENCOUNTER — HOSPITAL ENCOUNTER (INPATIENT)
Facility: MEDICAL CENTER | Age: 27
LOS: 3 days | DRG: 377 | End: 2022-08-28
Attending: EMERGENCY MEDICINE | Admitting: HOSPITALIST
Payer: COMMERCIAL

## 2022-01-01 ENCOUNTER — APPOINTMENT (OUTPATIENT)
Dept: RADIOLOGY | Facility: MEDICAL CENTER | Age: 27
DRG: 377 | End: 2022-01-01
Attending: HOSPITALIST
Payer: COMMERCIAL

## 2022-01-01 ENCOUNTER — HOSPITAL ENCOUNTER (EMERGENCY)
Facility: MEDICAL CENTER | Age: 27
End: 2022-08-12
Attending: EMERGENCY MEDICINE
Payer: COMMERCIAL

## 2022-01-01 ENCOUNTER — HOSPITAL ENCOUNTER (EMERGENCY)
Facility: MEDICAL CENTER | Age: 27
End: 2022-07-29
Attending: EMERGENCY MEDICINE
Payer: COMMERCIAL

## 2022-01-01 ENCOUNTER — ANESTHESIA EVENT (OUTPATIENT)
Dept: SURGERY | Facility: MEDICAL CENTER | Age: 27
DRG: 377 | End: 2022-01-01
Payer: COMMERCIAL

## 2022-01-01 ENCOUNTER — APPOINTMENT (OUTPATIENT)
Dept: RADIOLOGY | Facility: MEDICAL CENTER | Age: 27
DRG: 377 | End: 2022-01-01
Attending: INTERNAL MEDICINE
Payer: COMMERCIAL

## 2022-01-01 ENCOUNTER — HOSPITAL ENCOUNTER (EMERGENCY)
Facility: MEDICAL CENTER | Age: 27
End: 2022-10-13
Payer: COMMERCIAL

## 2022-01-01 VITALS
DIASTOLIC BLOOD PRESSURE: 102 MMHG | BODY MASS INDEX: 17.65 KG/M2 | SYSTOLIC BLOOD PRESSURE: 142 MMHG | HEIGHT: 67 IN | WEIGHT: 112.43 LBS | OXYGEN SATURATION: 100 % | TEMPERATURE: 97.6 F | RESPIRATION RATE: 18 BRPM | HEART RATE: 88 BPM

## 2022-01-01 VITALS
OXYGEN SATURATION: 100 % | WEIGHT: 105 LBS | DIASTOLIC BLOOD PRESSURE: 80 MMHG | TEMPERATURE: 97.9 F | HEART RATE: 78 BPM | HEIGHT: 67 IN | RESPIRATION RATE: 14 BRPM | SYSTOLIC BLOOD PRESSURE: 123 MMHG | BODY MASS INDEX: 16.48 KG/M2

## 2022-01-01 VITALS
TEMPERATURE: 97 F | DIASTOLIC BLOOD PRESSURE: 72 MMHG | OXYGEN SATURATION: 100 % | SYSTOLIC BLOOD PRESSURE: 119 MMHG | BODY MASS INDEX: 17.27 KG/M2 | HEART RATE: 78 BPM | WEIGHT: 110 LBS | RESPIRATION RATE: 13 BRPM | HEIGHT: 67 IN

## 2022-01-01 VITALS
BODY MASS INDEX: 18.05 KG/M2 | HEIGHT: 67 IN | TEMPERATURE: 98.7 F | WEIGHT: 115 LBS | DIASTOLIC BLOOD PRESSURE: 91 MMHG | SYSTOLIC BLOOD PRESSURE: 151 MMHG | RESPIRATION RATE: 18 BRPM | OXYGEN SATURATION: 99 % | HEART RATE: 76 BPM

## 2022-01-01 VITALS
BODY MASS INDEX: 15.7 KG/M2 | SYSTOLIC BLOOD PRESSURE: 111 MMHG | HEIGHT: 67 IN | OXYGEN SATURATION: 96 % | WEIGHT: 100 LBS | HEART RATE: 90 BPM | DIASTOLIC BLOOD PRESSURE: 85 MMHG | RESPIRATION RATE: 16 BRPM | TEMPERATURE: 97.6 F

## 2022-01-01 VITALS
OXYGEN SATURATION: 100 % | BODY MASS INDEX: 18.05 KG/M2 | HEART RATE: 82 BPM | RESPIRATION RATE: 20 BRPM | DIASTOLIC BLOOD PRESSURE: 74 MMHG | SYSTOLIC BLOOD PRESSURE: 113 MMHG | WEIGHT: 115 LBS | TEMPERATURE: 98.3 F | HEIGHT: 67 IN

## 2022-01-01 VITALS
OXYGEN SATURATION: 100 % | BODY MASS INDEX: 15.88 KG/M2 | DIASTOLIC BLOOD PRESSURE: 110 MMHG | HEART RATE: 102 BPM | TEMPERATURE: 97.7 F | HEIGHT: 67 IN | RESPIRATION RATE: 18 BRPM | WEIGHT: 101.19 LBS | SYSTOLIC BLOOD PRESSURE: 146 MMHG

## 2022-01-01 VITALS
SYSTOLIC BLOOD PRESSURE: 146 MMHG | RESPIRATION RATE: 16 BRPM | BODY MASS INDEX: 17.23 KG/M2 | HEART RATE: 99 BPM | OXYGEN SATURATION: 100 % | TEMPERATURE: 97.6 F | WEIGHT: 110 LBS | DIASTOLIC BLOOD PRESSURE: 94 MMHG

## 2022-01-01 VITALS
OXYGEN SATURATION: 94 % | BODY MASS INDEX: 17.27 KG/M2 | SYSTOLIC BLOOD PRESSURE: 178 MMHG | TEMPERATURE: 97.4 F | HEIGHT: 67 IN | DIASTOLIC BLOOD PRESSURE: 107 MMHG | RESPIRATION RATE: 16 BRPM | HEART RATE: 68 BPM | WEIGHT: 110 LBS

## 2022-01-01 VITALS
HEIGHT: 67 IN | OXYGEN SATURATION: 94 % | WEIGHT: 98.33 LBS | HEART RATE: 100 BPM | TEMPERATURE: 97.7 F | RESPIRATION RATE: 18 BRPM | SYSTOLIC BLOOD PRESSURE: 83 MMHG | BODY MASS INDEX: 15.43 KG/M2 | DIASTOLIC BLOOD PRESSURE: 44 MMHG

## 2022-01-01 VITALS
HEART RATE: 70 BPM | SYSTOLIC BLOOD PRESSURE: 135 MMHG | HEIGHT: 67 IN | WEIGHT: 110 LBS | DIASTOLIC BLOOD PRESSURE: 85 MMHG | BODY MASS INDEX: 17.27 KG/M2 | RESPIRATION RATE: 13 BRPM | OXYGEN SATURATION: 93 % | TEMPERATURE: 98 F

## 2022-01-01 VITALS
OXYGEN SATURATION: 95 % | SYSTOLIC BLOOD PRESSURE: 153 MMHG | RESPIRATION RATE: 18 BRPM | DIASTOLIC BLOOD PRESSURE: 110 MMHG | TEMPERATURE: 98.2 F | HEART RATE: 81 BPM

## 2022-01-01 VITALS
DIASTOLIC BLOOD PRESSURE: 80 MMHG | HEART RATE: 62 BPM | RESPIRATION RATE: 16 BRPM | TEMPERATURE: 98.5 F | SYSTOLIC BLOOD PRESSURE: 117 MMHG

## 2022-01-01 VITALS
TEMPERATURE: 98.2 F | SYSTOLIC BLOOD PRESSURE: 122 MMHG | HEIGHT: 67 IN | BODY MASS INDEX: 18.05 KG/M2 | OXYGEN SATURATION: 100 % | HEART RATE: 86 BPM | RESPIRATION RATE: 18 BRPM | WEIGHT: 115 LBS | DIASTOLIC BLOOD PRESSURE: 76 MMHG

## 2022-01-01 DIAGNOSIS — D64.9 ACUTE ON CHRONIC ANEMIA: ICD-10-CM

## 2022-01-01 DIAGNOSIS — F34.1 PERSISTENT DEPRESSIVE DISORDER WITH ANXIOUS DISTRESS, CURRENTLY MILD: ICD-10-CM

## 2022-01-01 DIAGNOSIS — G89.29 OTHER CHRONIC PAIN: ICD-10-CM

## 2022-01-01 DIAGNOSIS — N18.6 ESRD ON DIALYSIS (HCC): ICD-10-CM

## 2022-01-01 DIAGNOSIS — N18.9 CHRONIC RENAL FAILURE, UNSPECIFIED CKD STAGE: ICD-10-CM

## 2022-01-01 DIAGNOSIS — M54.50 MIDLINE LOW BACK PAIN WITHOUT SCIATICA, UNSPECIFIED CHRONICITY: ICD-10-CM

## 2022-01-01 DIAGNOSIS — R51.9 NONINTRACTABLE HEADACHE, UNSPECIFIED CHRONICITY PATTERN, UNSPECIFIED HEADACHE TYPE: ICD-10-CM

## 2022-01-01 DIAGNOSIS — F41.9 ANXIETY: ICD-10-CM

## 2022-01-01 DIAGNOSIS — D64.9 SYMPTOMATIC ANEMIA: ICD-10-CM

## 2022-01-01 DIAGNOSIS — E87.5 HYPERKALEMIA: ICD-10-CM

## 2022-01-01 DIAGNOSIS — D64.9 ANEMIA, UNSPECIFIED TYPE: ICD-10-CM

## 2022-01-01 DIAGNOSIS — M53.3 SACROILIAC PAIN: ICD-10-CM

## 2022-01-01 DIAGNOSIS — Z99.2 ESRD ON DIALYSIS (HCC): ICD-10-CM

## 2022-01-01 DIAGNOSIS — M25.50 ARTHRALGIA, UNSPECIFIED JOINT: ICD-10-CM

## 2022-01-01 DIAGNOSIS — G89.29 CHRONIC BILATERAL LOW BACK PAIN WITHOUT SCIATICA: ICD-10-CM

## 2022-01-01 DIAGNOSIS — G89.29 CHRONIC LOW BACK PAIN WITHOUT SCIATICA, UNSPECIFIED BACK PAIN LATERALITY: ICD-10-CM

## 2022-01-01 DIAGNOSIS — R06.09 CHRONIC DYSPNEA: ICD-10-CM

## 2022-01-01 DIAGNOSIS — K92.2 GASTROINTESTINAL HEMORRHAGE, UNSPECIFIED GASTROINTESTINAL HEMORRHAGE TYPE: ICD-10-CM

## 2022-01-01 DIAGNOSIS — M54.50 CHRONIC BILATERAL LOW BACK PAIN WITHOUT SCIATICA: ICD-10-CM

## 2022-01-01 DIAGNOSIS — Z99.2 ANEMIA DUE TO CHRONIC KIDNEY DISEASE, ON CHRONIC DIALYSIS (HCC): ICD-10-CM

## 2022-01-01 DIAGNOSIS — Z99.2 ESRD (END STAGE RENAL DISEASE) ON DIALYSIS (HCC): ICD-10-CM

## 2022-01-01 DIAGNOSIS — D63.1 ANEMIA DUE TO CHRONIC KIDNEY DISEASE, ON CHRONIC DIALYSIS (HCC): ICD-10-CM

## 2022-01-01 DIAGNOSIS — Z99.2 STAGE 5 CHRONIC KIDNEY DISEASE ON CHRONIC DIALYSIS (HCC): ICD-10-CM

## 2022-01-01 DIAGNOSIS — Z99.2 DIALYSIS PATIENT (HCC): ICD-10-CM

## 2022-01-01 DIAGNOSIS — K92.2 UPPER GI BLEED: ICD-10-CM

## 2022-01-01 DIAGNOSIS — R53.83 OTHER FATIGUE: ICD-10-CM

## 2022-01-01 DIAGNOSIS — Z99.2 END STAGE RENAL DISEASE ON DIALYSIS (HCC): ICD-10-CM

## 2022-01-01 DIAGNOSIS — N18.6 END STAGE RENAL DISEASE ON DIALYSIS (HCC): ICD-10-CM

## 2022-01-01 DIAGNOSIS — M54.50 CHRONIC LOW BACK PAIN WITHOUT SCIATICA, UNSPECIFIED BACK PAIN LATERALITY: ICD-10-CM

## 2022-01-01 DIAGNOSIS — N18.6 ANEMIA DUE TO CHRONIC KIDNEY DISEASE, ON CHRONIC DIALYSIS (HCC): ICD-10-CM

## 2022-01-01 DIAGNOSIS — N18.6 ESRD (END STAGE RENAL DISEASE) ON DIALYSIS (HCC): ICD-10-CM

## 2022-01-01 DIAGNOSIS — R51.9 NONINTRACTABLE EPISODIC HEADACHE, UNSPECIFIED HEADACHE TYPE: ICD-10-CM

## 2022-01-01 DIAGNOSIS — R42 DIZZINESS: ICD-10-CM

## 2022-01-01 DIAGNOSIS — N18.6 STAGE 5 CHRONIC KIDNEY DISEASE ON CHRONIC DIALYSIS (HCC): ICD-10-CM

## 2022-01-01 PROBLEM — E43 SEVERE PROTEIN-CALORIE MALNUTRITION (HCC): Status: ACTIVE | Noted: 2022-01-01

## 2022-01-01 LAB
ABO GROUP BLD: ABNORMAL
ABO GROUP BLD: NORMAL
ALBUMIN SERPL BCP-MCNC: 2.1 G/DL (ref 3.2–4.9)
ALBUMIN SERPL BCP-MCNC: 2.5 G/DL (ref 3.2–4.9)
ALBUMIN SERPL BCP-MCNC: 2.6 G/DL (ref 3.2–4.9)
ALBUMIN SERPL BCP-MCNC: 2.8 G/DL (ref 3.2–4.9)
ALBUMIN SERPL BCP-MCNC: 2.8 G/DL (ref 3.2–4.9)
ALBUMIN SERPL BCP-MCNC: 2.9 G/DL (ref 3.2–4.9)
ALBUMIN/GLOB SERPL: 0.4 G/DL
ALBUMIN/GLOB SERPL: 0.5 G/DL
ALP SERPL-CCNC: 102 U/L (ref 30–99)
ALP SERPL-CCNC: 119 U/L (ref 30–99)
ALP SERPL-CCNC: 157 U/L (ref 30–99)
ALP SERPL-CCNC: 64 U/L (ref 30–99)
ALP SERPL-CCNC: 72 U/L (ref 30–99)
ALP SERPL-CCNC: 84 U/L (ref 30–99)
ALP SERPL-CCNC: 85 U/L (ref 30–99)
ALP SERPL-CCNC: 91 U/L (ref 30–99)
ALT SERPL-CCNC: 10 U/L (ref 2–50)
ALT SERPL-CCNC: 10 U/L (ref 2–50)
ALT SERPL-CCNC: 11 U/L (ref 2–50)
ALT SERPL-CCNC: 11 U/L (ref 2–50)
ALT SERPL-CCNC: 21 U/L (ref 2–50)
ALT SERPL-CCNC: 5 U/L (ref 2–50)
ALT SERPL-CCNC: 7 U/L (ref 2–50)
ALT SERPL-CCNC: 9 U/L (ref 2–50)
ANION GAP SERPL CALC-SCNC: 10 MMOL/L (ref 7–16)
ANION GAP SERPL CALC-SCNC: 10 MMOL/L (ref 7–16)
ANION GAP SERPL CALC-SCNC: 11 MMOL/L (ref 7–16)
ANION GAP SERPL CALC-SCNC: 12 MMOL/L (ref 7–16)
ANION GAP SERPL CALC-SCNC: 12 MMOL/L (ref 7–16)
ANION GAP SERPL CALC-SCNC: 13 MMOL/L (ref 7–16)
ANION GAP SERPL CALC-SCNC: 14 MMOL/L (ref 7–16)
ANION GAP SERPL CALC-SCNC: 16 MMOL/L (ref 7–16)
ANION GAP SERPL CALC-SCNC: 7 MMOL/L (ref 7–16)
ANION GAP SERPL CALC-SCNC: 8 MMOL/L (ref 7–16)
ANION GAP SERPL CALC-SCNC: 9 MMOL/L (ref 7–16)
ANISOCYTOSIS BLD QL SMEAR: ABNORMAL
APTT PPP: 33 SEC (ref 24.7–36)
AST SERPL-CCNC: 14 U/L (ref 12–45)
AST SERPL-CCNC: 18 U/L (ref 12–45)
AST SERPL-CCNC: 19 U/L (ref 12–45)
AST SERPL-CCNC: 21 U/L (ref 12–45)
AST SERPL-CCNC: 21 U/L (ref 12–45)
AST SERPL-CCNC: 24 U/L (ref 12–45)
AST SERPL-CCNC: 25 U/L (ref 12–45)
AST SERPL-CCNC: 25 U/L (ref 12–45)
BARCODED ABORH UBTYP: 5100
BARCODED PRD CODE UBPRD: NORMAL
BARCODED UNIT NUM UBUNT: NORMAL
BASOPHILS # BLD AUTO: 0 % (ref 0–1.8)
BASOPHILS # BLD AUTO: 0.1 % (ref 0–1.8)
BASOPHILS # BLD AUTO: 0.3 % (ref 0–1.8)
BASOPHILS # BLD AUTO: 0.3 % (ref 0–1.8)
BASOPHILS # BLD AUTO: 0.4 % (ref 0–1.8)
BASOPHILS # BLD AUTO: 0.4 % (ref 0–1.8)
BASOPHILS # BLD AUTO: 0.7 % (ref 0–1.8)
BASOPHILS # BLD AUTO: 0.8 % (ref 0–1.8)
BASOPHILS # BLD AUTO: 0.9 % (ref 0–1.8)
BASOPHILS # BLD AUTO: 1.1 % (ref 0–1.8)
BASOPHILS # BLD AUTO: 1.2 % (ref 0–1.8)
BASOPHILS # BLD AUTO: 2 % (ref 0–1.8)
BASOPHILS # BLD: 0 K/UL (ref 0–0.12)
BASOPHILS # BLD: 0.01 K/UL (ref 0–0.12)
BASOPHILS # BLD: 0.01 K/UL (ref 0–0.12)
BASOPHILS # BLD: 0.02 K/UL (ref 0–0.12)
BASOPHILS # BLD: 0.03 K/UL (ref 0–0.12)
BASOPHILS # BLD: 0.04 K/UL (ref 0–0.12)
BILIRUB SERPL-MCNC: 0.3 MG/DL (ref 0.1–1.5)
BILIRUB SERPL-MCNC: 0.4 MG/DL (ref 0.1–1.5)
BILIRUB SERPL-MCNC: 0.9 MG/DL (ref 0.1–1.5)
BLD GP AB SCN SERPL QL: NORMAL
BUN SERPL-MCNC: 13 MG/DL (ref 8–22)
BUN SERPL-MCNC: 14 MG/DL (ref 8–22)
BUN SERPL-MCNC: 21 MG/DL (ref 8–22)
BUN SERPL-MCNC: 24 MG/DL (ref 8–22)
BUN SERPL-MCNC: 24 MG/DL (ref 8–22)
BUN SERPL-MCNC: 33 MG/DL (ref 8–22)
BUN SERPL-MCNC: 37 MG/DL (ref 8–22)
BUN SERPL-MCNC: 40 MG/DL (ref 8–22)
BUN SERPL-MCNC: 50 MG/DL (ref 8–22)
BUN SERPL-MCNC: 61 MG/DL (ref 8–22)
BUN SERPL-MCNC: 72 MG/DL (ref 8–22)
CALCIUM SERPL-MCNC: 7.4 MG/DL (ref 8.4–10.2)
CALCIUM SERPL-MCNC: 8.1 MG/DL (ref 8.4–10.2)
CALCIUM SERPL-MCNC: 8.2 MG/DL (ref 8.4–10.2)
CALCIUM SERPL-MCNC: 8.3 MG/DL (ref 8.4–10.2)
CALCIUM SERPL-MCNC: 8.4 MG/DL (ref 8.4–10.2)
CALCIUM SERPL-MCNC: 8.6 MG/DL (ref 8.4–10.2)
CALCIUM SERPL-MCNC: 8.6 MG/DL (ref 8.4–10.2)
CALCIUM SERPL-MCNC: 8.7 MG/DL (ref 8.4–10.2)
CALCIUM SERPL-MCNC: 8.8 MG/DL (ref 8.4–10.2)
CHLORIDE SERPL-SCNC: 85 MMOL/L (ref 96–112)
CHLORIDE SERPL-SCNC: 87 MMOL/L (ref 96–112)
CHLORIDE SERPL-SCNC: 88 MMOL/L (ref 96–112)
CHLORIDE SERPL-SCNC: 90 MMOL/L (ref 96–112)
CHLORIDE SERPL-SCNC: 91 MMOL/L (ref 96–112)
CHLORIDE SERPL-SCNC: 93 MMOL/L (ref 96–112)
CHLORIDE SERPL-SCNC: 94 MMOL/L (ref 96–112)
CO2 SERPL-SCNC: 21 MMOL/L (ref 20–33)
CO2 SERPL-SCNC: 26 MMOL/L (ref 20–33)
CO2 SERPL-SCNC: 27 MMOL/L (ref 20–33)
CO2 SERPL-SCNC: 27 MMOL/L (ref 20–33)
CO2 SERPL-SCNC: 29 MMOL/L (ref 20–33)
CO2 SERPL-SCNC: 30 MMOL/L (ref 20–33)
CO2 SERPL-SCNC: 30 MMOL/L (ref 20–33)
CO2 SERPL-SCNC: 31 MMOL/L (ref 20–33)
CO2 SERPL-SCNC: 31 MMOL/L (ref 20–33)
CO2 SERPL-SCNC: 33 MMOL/L (ref 20–33)
CO2 SERPL-SCNC: 34 MMOL/L (ref 20–33)
COMMENT 1642: NORMAL
COMPONENT R 8504R: NORMAL
CREAT SERPL-MCNC: 2.62 MG/DL (ref 0.5–1.4)
CREAT SERPL-MCNC: 2.83 MG/DL (ref 0.5–1.4)
CREAT SERPL-MCNC: 2.93 MG/DL (ref 0.5–1.4)
CREAT SERPL-MCNC: 3.41 MG/DL (ref 0.5–1.4)
CREAT SERPL-MCNC: 3.68 MG/DL (ref 0.5–1.4)
CREAT SERPL-MCNC: 4.1 MG/DL (ref 0.5–1.4)
CREAT SERPL-MCNC: 4.11 MG/DL (ref 0.5–1.4)
CREAT SERPL-MCNC: 4.35 MG/DL (ref 0.5–1.4)
CREAT SERPL-MCNC: 4.82 MG/DL (ref 0.5–1.4)
CREAT SERPL-MCNC: 4.88 MG/DL (ref 0.5–1.4)
CREAT SERPL-MCNC: 5.63 MG/DL (ref 0.5–1.4)
CRP SERPL HS-MCNC: 68.3 MG/L (ref 0–3)
DAT C3D-SP REAG RBC QL: ABNORMAL
DAT IGG-SP REAG RBC QL: ABNORMAL
EKG IMPRESSION: NORMAL
EKG IMPRESSION: NORMAL
EOSINOPHIL # BLD AUTO: 0 K/UL (ref 0–0.51)
EOSINOPHIL # BLD AUTO: 0.01 K/UL (ref 0–0.51)
EOSINOPHIL # BLD AUTO: 0.01 K/UL (ref 0–0.51)
EOSINOPHIL # BLD AUTO: 0.02 K/UL (ref 0–0.51)
EOSINOPHIL # BLD AUTO: 0.02 K/UL (ref 0–0.51)
EOSINOPHIL # BLD AUTO: 0.03 K/UL (ref 0–0.51)
EOSINOPHIL # BLD AUTO: 0.17 K/UL (ref 0–0.51)
EOSINOPHIL NFR BLD: 0 % (ref 0–6.9)
EOSINOPHIL NFR BLD: 0.1 % (ref 0–6.9)
EOSINOPHIL NFR BLD: 0.3 % (ref 0–6.9)
EOSINOPHIL NFR BLD: 0.4 % (ref 0–6.9)
EOSINOPHIL NFR BLD: 0.4 % (ref 0–6.9)
EOSINOPHIL NFR BLD: 0.7 % (ref 0–6.9)
EOSINOPHIL NFR BLD: 0.8 % (ref 0–6.9)
EOSINOPHIL NFR BLD: 0.9 % (ref 0–6.9)
EOSINOPHIL NFR BLD: 1.2 % (ref 0–6.9)
EOSINOPHIL NFR BLD: 2.7 % (ref 0–6.9)
ERYTHROCYTE [DISTWIDTH] IN BLOOD BY AUTOMATED COUNT: 56.2 FL (ref 35.9–50)
ERYTHROCYTE [DISTWIDTH] IN BLOOD BY AUTOMATED COUNT: 56.9 FL (ref 35.9–50)
ERYTHROCYTE [DISTWIDTH] IN BLOOD BY AUTOMATED COUNT: 57.5 FL (ref 35.9–50)
ERYTHROCYTE [DISTWIDTH] IN BLOOD BY AUTOMATED COUNT: 57.6 FL (ref 35.9–50)
ERYTHROCYTE [DISTWIDTH] IN BLOOD BY AUTOMATED COUNT: 61.1 FL (ref 35.9–50)
ERYTHROCYTE [DISTWIDTH] IN BLOOD BY AUTOMATED COUNT: 62 FL (ref 35.9–50)
ERYTHROCYTE [DISTWIDTH] IN BLOOD BY AUTOMATED COUNT: 62.1 FL (ref 35.9–50)
ERYTHROCYTE [DISTWIDTH] IN BLOOD BY AUTOMATED COUNT: 62.1 FL (ref 35.9–50)
ERYTHROCYTE [DISTWIDTH] IN BLOOD BY AUTOMATED COUNT: 62.9 FL (ref 35.9–50)
ERYTHROCYTE [DISTWIDTH] IN BLOOD BY AUTOMATED COUNT: 63.8 FL (ref 35.9–50)
ERYTHROCYTE [DISTWIDTH] IN BLOOD BY AUTOMATED COUNT: 63.9 FL (ref 35.9–50)
ERYTHROCYTE [DISTWIDTH] IN BLOOD BY AUTOMATED COUNT: 65.2 FL (ref 35.9–50)
ERYTHROCYTE [DISTWIDTH] IN BLOOD BY AUTOMATED COUNT: 67.1 FL (ref 35.9–50)
ERYTHROCYTE [DISTWIDTH] IN BLOOD BY AUTOMATED COUNT: 74.7 FL (ref 35.9–50)
FERRITIN SERPL-MCNC: 831 NG/ML (ref 10–291)
FLUAV RNA SPEC QL NAA+PROBE: NEGATIVE
FLUAV RNA SPEC QL NAA+PROBE: NEGATIVE
FLUBV RNA SPEC QL NAA+PROBE: NEGATIVE
FLUBV RNA SPEC QL NAA+PROBE: NEGATIVE
GFR SERPLBLD CREATININE-BSD FMLA CKD-EPI: 10 ML/MIN/1.73 M 2
GFR SERPLBLD CREATININE-BSD FMLA CKD-EPI: 12 ML/MIN/1.73 M 2
GFR SERPLBLD CREATININE-BSD FMLA CKD-EPI: 14 ML/MIN/1.73 M 2
GFR SERPLBLD CREATININE-BSD FMLA CKD-EPI: 15 ML/MIN/1.73 M 2
GFR SERPLBLD CREATININE-BSD FMLA CKD-EPI: 15 ML/MIN/1.73 M 2
GFR SERPLBLD CREATININE-BSD FMLA CKD-EPI: 17 ML/MIN/1.73 M 2
GFR SERPLBLD CREATININE-BSD FMLA CKD-EPI: 18 ML/MIN/1.73 M 2
GFR SERPLBLD CREATININE-BSD FMLA CKD-EPI: 22 ML/MIN/1.73 M 2
GFR SERPLBLD CREATININE-BSD FMLA CKD-EPI: 23 ML/MIN/1.73 M 2
GFR SERPLBLD CREATININE-BSD FMLA CKD-EPI: 25 ML/MIN/1.73 M 2
GLOBULIN SER CALC-MCNC: 5.2 G/DL (ref 1.9–3.5)
GLOBULIN SER CALC-MCNC: 5.3 G/DL (ref 1.9–3.5)
GLOBULIN SER CALC-MCNC: 5.9 G/DL (ref 1.9–3.5)
GLOBULIN SER CALC-MCNC: 5.9 G/DL (ref 1.9–3.5)
GLOBULIN SER CALC-MCNC: 6.1 G/DL (ref 1.9–3.5)
GLOBULIN SER CALC-MCNC: 6.2 G/DL (ref 1.9–3.5)
GLOBULIN SER CALC-MCNC: 6.4 G/DL (ref 1.9–3.5)
GLOBULIN SER CALC-MCNC: 6.6 G/DL (ref 1.9–3.5)
GLUCOSE BLD STRIP.AUTO-MCNC: 150 MG/DL (ref 65–99)
GLUCOSE BLD STRIP.AUTO-MCNC: 51 MG/DL (ref 65–99)
GLUCOSE BLD STRIP.AUTO-MCNC: 60 MG/DL (ref 65–99)
GLUCOSE BLD STRIP.AUTO-MCNC: 66 MG/DL (ref 65–99)
GLUCOSE BLD STRIP.AUTO-MCNC: 75 MG/DL (ref 65–99)
GLUCOSE SERPL-MCNC: 119 MG/DL (ref 65–99)
GLUCOSE SERPL-MCNC: 64 MG/DL (ref 65–99)
GLUCOSE SERPL-MCNC: 74 MG/DL (ref 65–99)
GLUCOSE SERPL-MCNC: 80 MG/DL (ref 65–99)
GLUCOSE SERPL-MCNC: 82 MG/DL (ref 65–99)
GLUCOSE SERPL-MCNC: 83 MG/DL (ref 65–99)
GLUCOSE SERPL-MCNC: 84 MG/DL (ref 65–99)
GLUCOSE SERPL-MCNC: 86 MG/DL (ref 65–99)
GLUCOSE SERPL-MCNC: 88 MG/DL (ref 65–99)
GLUCOSE SERPL-MCNC: 88 MG/DL (ref 65–99)
GLUCOSE SERPL-MCNC: 90 MG/DL (ref 65–99)
HCG SERPL QL: NEGATIVE
HCG SERPL QL: NEGATIVE
HCT VFR BLD AUTO: 18 % (ref 37–47)
HCT VFR BLD AUTO: 20.7 % (ref 37–47)
HCT VFR BLD AUTO: 21.1 % (ref 37–47)
HCT VFR BLD AUTO: 21.2 % (ref 37–47)
HCT VFR BLD AUTO: 23.4 % (ref 37–47)
HCT VFR BLD AUTO: 24.4 % (ref 37–47)
HCT VFR BLD AUTO: 24.6 % (ref 37–47)
HCT VFR BLD AUTO: 25.3 % (ref 37–47)
HCT VFR BLD AUTO: 25.3 % (ref 37–47)
HCT VFR BLD AUTO: 25.4 % (ref 37–47)
HCT VFR BLD AUTO: 27.4 % (ref 37–47)
HCT VFR BLD AUTO: 30.2 % (ref 37–47)
HCT VFR BLD AUTO: 30.9 % (ref 37–47)
HCT VFR BLD AUTO: 31.1 % (ref 37–47)
HCT VFR BLD AUTO: 31.1 % (ref 37–47)
HCT VFR BLD AUTO: 31.8 % (ref 37–47)
HCT VFR BLD AUTO: 32.7 % (ref 37–47)
HCT VFR BLD AUTO: 35.8 % (ref 37–47)
HCT VFR BLD AUTO: 37.1 % (ref 37–47)
HGB BLD-MCNC: 10.5 G/DL (ref 12–16)
HGB BLD-MCNC: 11.1 G/DL (ref 12–16)
HGB BLD-MCNC: 5.4 G/DL (ref 12–16)
HGB BLD-MCNC: 6.1 G/DL (ref 12–16)
HGB BLD-MCNC: 6.3 G/DL (ref 12–16)
HGB BLD-MCNC: 6.4 G/DL (ref 12–16)
HGB BLD-MCNC: 6.9 G/DL (ref 12–16)
HGB BLD-MCNC: 7.3 G/DL (ref 12–16)
HGB BLD-MCNC: 7.3 G/DL (ref 12–16)
HGB BLD-MCNC: 7.4 G/DL (ref 12–16)
HGB BLD-MCNC: 7.7 G/DL (ref 12–16)
HGB BLD-MCNC: 8.1 G/DL (ref 12–16)
HGB BLD-MCNC: 8.5 G/DL (ref 12–16)
HGB BLD-MCNC: 9.1 G/DL (ref 12–16)
HGB BLD-MCNC: 9.2 G/DL (ref 12–16)
HGB BLD-MCNC: 9.2 G/DL (ref 12–16)
HGB BLD-MCNC: 9.3 G/DL (ref 12–16)
HGB BLD-MCNC: 9.5 G/DL (ref 12–16)
HGB BLD-MCNC: 9.8 G/DL (ref 12–16)
HYPOCHROMIA BLD QL SMEAR: ABNORMAL
IMM GRANULOCYTES # BLD AUTO: 0 K/UL (ref 0–0.11)
IMM GRANULOCYTES # BLD AUTO: 0.01 K/UL (ref 0–0.11)
IMM GRANULOCYTES # BLD AUTO: 0.02 K/UL (ref 0–0.11)
IMM GRANULOCYTES # BLD AUTO: 0.02 K/UL (ref 0–0.11)
IMM GRANULOCYTES # BLD AUTO: 0.03 K/UL (ref 0–0.11)
IMM GRANULOCYTES # BLD AUTO: 0.04 K/UL (ref 0–0.11)
IMM GRANULOCYTES # BLD AUTO: 0.04 K/UL (ref 0–0.11)
IMM GRANULOCYTES # BLD AUTO: 0.05 K/UL (ref 0–0.11)
IMM GRANULOCYTES NFR BLD AUTO: 0 % (ref 0–0.9)
IMM GRANULOCYTES NFR BLD AUTO: 0.3 % (ref 0–0.9)
IMM GRANULOCYTES NFR BLD AUTO: 0.4 % (ref 0–0.9)
IMM GRANULOCYTES NFR BLD AUTO: 0.5 % (ref 0–0.9)
IMM GRANULOCYTES NFR BLD AUTO: 0.6 % (ref 0–0.9)
IMM GRANULOCYTES NFR BLD AUTO: 0.7 % (ref 0–0.9)
IMM GRANULOCYTES NFR BLD AUTO: 0.9 % (ref 0–0.9)
INR PPP: 1.22 (ref 0.87–1.13)
IRON SATN MFR SERPL: 29 % (ref 15–55)
IRON SERPL-MCNC: 41 UG/DL (ref 40–170)
LACTATE BLD-SCNC: 1.4 MMOL/L (ref 0.5–2)
LACTATE BLD-SCNC: 1.4 MMOL/L (ref 0.5–2)
LACTATE BLD-SCNC: 1.6 MMOL/L (ref 0.5–2)
LACTATE BLD-SCNC: 2 MMOL/L (ref 0.5–2)
LACTATE BLD-SCNC: 2.1 MMOL/L (ref 0.5–2)
LACTATE BLD-SCNC: 2.7 MMOL/L (ref 0.5–2)
LACTATE SERPL-SCNC: 1.1 MMOL/L (ref 0.5–2)
LIPASE SERPL-CCNC: 17 U/L (ref 7–58)
LIPASE SERPL-CCNC: 19 U/L (ref 7–58)
LIPASE SERPL-CCNC: 29 U/L (ref 7–58)
LYMPHOCYTES # BLD AUTO: 0.39 K/UL (ref 1–4.8)
LYMPHOCYTES # BLD AUTO: 0.4 K/UL (ref 1–4.8)
LYMPHOCYTES # BLD AUTO: 0.41 K/UL (ref 1–4.8)
LYMPHOCYTES # BLD AUTO: 0.42 K/UL (ref 1–4.8)
LYMPHOCYTES # BLD AUTO: 0.43 K/UL (ref 1–4.8)
LYMPHOCYTES # BLD AUTO: 0.43 K/UL (ref 1–4.8)
LYMPHOCYTES # BLD AUTO: 0.44 K/UL (ref 1–4.8)
LYMPHOCYTES # BLD AUTO: 0.52 K/UL (ref 1–4.8)
LYMPHOCYTES # BLD AUTO: 0.57 K/UL (ref 1–4.8)
LYMPHOCYTES # BLD AUTO: 0.57 K/UL (ref 1–4.8)
LYMPHOCYTES # BLD AUTO: 0.59 K/UL (ref 1–4.8)
LYMPHOCYTES # BLD AUTO: 0.73 K/UL (ref 1–4.8)
LYMPHOCYTES NFR BLD: 11.5 % (ref 22–41)
LYMPHOCYTES NFR BLD: 12 % (ref 22–41)
LYMPHOCYTES NFR BLD: 12.7 % (ref 22–41)
LYMPHOCYTES NFR BLD: 15.1 % (ref 22–41)
LYMPHOCYTES NFR BLD: 21.3 % (ref 22–41)
LYMPHOCYTES NFR BLD: 22 % (ref 22–41)
LYMPHOCYTES NFR BLD: 26.5 % (ref 22–41)
LYMPHOCYTES NFR BLD: 30.2 % (ref 22–41)
LYMPHOCYTES NFR BLD: 4.3 % (ref 22–41)
LYMPHOCYTES NFR BLD: 6.3 % (ref 22–41)
LYMPHOCYTES NFR BLD: 6.4 % (ref 22–41)
LYMPHOCYTES NFR BLD: 7.6 % (ref 22–41)
MACROCYTES BLD QL SMEAR: ABNORMAL
MAGNESIUM SERPL-MCNC: 1.3 MG/DL (ref 1.5–2.5)
MAGNESIUM SERPL-MCNC: 1.5 MG/DL (ref 1.5–2.5)
MAGNESIUM SERPL-MCNC: 1.7 MG/DL (ref 1.5–2.5)
MANUAL DIFF BLD: NORMAL
MANUAL DIFF BLD: NORMAL
MCH RBC QN AUTO: 25.9 PG (ref 27–33)
MCH RBC QN AUTO: 26 PG (ref 27–33)
MCH RBC QN AUTO: 26.5 PG (ref 27–33)
MCH RBC QN AUTO: 26.5 PG (ref 27–33)
MCH RBC QN AUTO: 27 PG (ref 27–33)
MCH RBC QN AUTO: 27 PG (ref 27–33)
MCH RBC QN AUTO: 27.2 PG (ref 27–33)
MCH RBC QN AUTO: 27.8 PG (ref 27–33)
MCH RBC QN AUTO: 28 PG (ref 27–33)
MCH RBC QN AUTO: 28.1 PG (ref 27–33)
MCH RBC QN AUTO: 28.1 PG (ref 27–33)
MCH RBC QN AUTO: 28.7 PG (ref 27–33)
MCH RBC QN AUTO: 28.9 PG (ref 27–33)
MCH RBC QN AUTO: 29.5 PG (ref 27–33)
MCHC RBC AUTO-ENTMCNC: 29.2 G/DL (ref 33.6–35)
MCHC RBC AUTO-ENTMCNC: 29.3 G/DL (ref 33.6–35)
MCHC RBC AUTO-ENTMCNC: 29.5 G/DL (ref 33.6–35)
MCHC RBC AUTO-ENTMCNC: 29.5 G/DL (ref 33.6–35)
MCHC RBC AUTO-ENTMCNC: 29.6 G/DL (ref 33.6–35)
MCHC RBC AUTO-ENTMCNC: 29.7 G/DL (ref 33.6–35)
MCHC RBC AUTO-ENTMCNC: 29.9 G/DL (ref 33.6–35)
MCHC RBC AUTO-ENTMCNC: 30 G/DL (ref 33.6–35)
MCHC RBC AUTO-ENTMCNC: 30 G/DL (ref 33.6–35)
MCHC RBC AUTO-ENTMCNC: 30.2 G/DL (ref 33.6–35)
MCHC RBC AUTO-ENTMCNC: 31 G/DL (ref 33.6–35)
MCV RBC AUTO: 100 FL (ref 81.4–97.8)
MCV RBC AUTO: 87.1 FL (ref 81.4–97.8)
MCV RBC AUTO: 88.4 FL (ref 81.4–97.8)
MCV RBC AUTO: 88.4 FL (ref 81.4–97.8)
MCV RBC AUTO: 89.5 FL (ref 81.4–97.8)
MCV RBC AUTO: 89.6 FL (ref 81.4–97.8)
MCV RBC AUTO: 90.1 FL (ref 81.4–97.8)
MCV RBC AUTO: 90.9 FL (ref 81.4–97.8)
MCV RBC AUTO: 92.3 FL (ref 81.4–97.8)
MCV RBC AUTO: 92.8 FL (ref 81.4–97.8)
MCV RBC AUTO: 93.8 FL (ref 81.4–97.8)
MCV RBC AUTO: 95.4 FL (ref 81.4–97.8)
MCV RBC AUTO: 96.3 FL (ref 81.4–97.8)
MCV RBC AUTO: 96.9 FL (ref 81.4–97.8)
MONOCYTES # BLD AUTO: 0.11 K/UL (ref 0–0.85)
MONOCYTES # BLD AUTO: 0.12 K/UL (ref 0–0.85)
MONOCYTES # BLD AUTO: 0.17 K/UL (ref 0–0.85)
MONOCYTES # BLD AUTO: 0.18 K/UL (ref 0–0.85)
MONOCYTES # BLD AUTO: 0.18 K/UL (ref 0–0.85)
MONOCYTES # BLD AUTO: 0.19 K/UL (ref 0–0.85)
MONOCYTES # BLD AUTO: 0.19 K/UL (ref 0–0.85)
MONOCYTES # BLD AUTO: 0.2 K/UL (ref 0–0.85)
MONOCYTES # BLD AUTO: 0.22 K/UL (ref 0–0.85)
MONOCYTES # BLD AUTO: 0.22 K/UL (ref 0–0.85)
MONOCYTES NFR BLD AUTO: 2.3 % (ref 0–13.4)
MONOCYTES NFR BLD AUTO: 2.7 % (ref 0–13.4)
MONOCYTES NFR BLD AUTO: 3 % (ref 0–13.4)
MONOCYTES NFR BLD AUTO: 3 % (ref 0–13.4)
MONOCYTES NFR BLD AUTO: 3.5 % (ref 0–13.4)
MONOCYTES NFR BLD AUTO: 4.8 % (ref 0–13.4)
MONOCYTES NFR BLD AUTO: 4.9 % (ref 0–13.4)
MONOCYTES NFR BLD AUTO: 5.3 % (ref 0–13.4)
MONOCYTES NFR BLD AUTO: 6 % (ref 0–13.4)
MONOCYTES NFR BLD AUTO: 7.5 % (ref 0–13.4)
MONOCYTES NFR BLD AUTO: 8.5 % (ref 0–13.4)
MONOCYTES NFR BLD AUTO: 9.1 % (ref 0–13.4)
MORPHOLOGY BLD-IMP: NORMAL
NEUTROPHILS # BLD AUTO: 1.4 K/UL (ref 2–7.15)
NEUTROPHILS # BLD AUTO: 1.4 K/UL (ref 2–7.15)
NEUTROPHILS # BLD AUTO: 1.43 K/UL (ref 2–7.15)
NEUTROPHILS # BLD AUTO: 1.86 K/UL (ref 2–7.15)
NEUTROPHILS # BLD AUTO: 2.74 K/UL (ref 2–7.15)
NEUTROPHILS # BLD AUTO: 2.96 K/UL (ref 2–7.15)
NEUTROPHILS # BLD AUTO: 3.06 K/UL (ref 2–7.15)
NEUTROPHILS # BLD AUTO: 3.3 K/UL (ref 2–7.15)
NEUTROPHILS # BLD AUTO: 4.75 K/UL (ref 2–7.15)
NEUTROPHILS # BLD AUTO: 5.52 K/UL (ref 2–7.15)
NEUTROPHILS # BLD AUTO: 5.99 K/UL (ref 2–7.15)
NEUTROPHILS # BLD AUTO: 8.99 K/UL (ref 2–7.15)
NEUTROPHILS NFR BLD: 57.9 % (ref 44–72)
NEUTROPHILS NFR BLD: 64.2 % (ref 44–72)
NEUTROPHILS NFR BLD: 67 % (ref 44–72)
NEUTROPHILS NFR BLD: 69.7 % (ref 44–72)
NEUTROPHILS NFR BLD: 78.2 % (ref 44–72)
NEUTROPHILS NFR BLD: 80.5 % (ref 44–72)
NEUTROPHILS NFR BLD: 80.5 % (ref 44–72)
NEUTROPHILS NFR BLD: 85 % (ref 44–72)
NEUTROPHILS NFR BLD: 87.4 % (ref 44–72)
NEUTROPHILS NFR BLD: 87.7 % (ref 44–72)
NEUTROPHILS NFR BLD: 89.3 % (ref 44–72)
NEUTROPHILS NFR BLD: 92.8 % (ref 44–72)
NEUTS BAND NFR BLD MANUAL: 3 % (ref 0–10)
NRBC # BLD AUTO: 0 K/UL
NRBC BLD-RTO: 0 /100 WBC
PATHOLOGIST INTERPRETATION PTRX: ABNORMAL
PLATELET # BLD AUTO: 131 K/UL (ref 164–446)
PLATELET # BLD AUTO: 159 K/UL (ref 164–446)
PLATELET # BLD AUTO: 166 K/UL (ref 164–446)
PLATELET # BLD AUTO: 176 K/UL (ref 164–446)
PLATELET # BLD AUTO: 187 K/UL (ref 164–446)
PLATELET # BLD AUTO: 194 K/UL (ref 164–446)
PLATELET # BLD AUTO: 206 K/UL (ref 164–446)
PLATELET # BLD AUTO: 213 K/UL (ref 164–446)
PLATELET # BLD AUTO: 222 K/UL (ref 164–446)
PLATELET # BLD AUTO: 229 K/UL (ref 164–446)
PLATELET # BLD AUTO: 232 K/UL (ref 164–446)
PLATELET # BLD AUTO: 239 K/UL (ref 164–446)
PLATELET # BLD AUTO: 250 K/UL (ref 164–446)
PLATELET # BLD AUTO: 98 K/UL (ref 164–446)
PLATELET BLD QL SMEAR: NORMAL
PMV BLD AUTO: 10.2 FL (ref 9–12.9)
PMV BLD AUTO: 10.2 FL (ref 9–12.9)
PMV BLD AUTO: 10.6 FL (ref 9–12.9)
PMV BLD AUTO: 11.3 FL (ref 9–12.9)
PMV BLD AUTO: 12.3 FL (ref 9–12.9)
PMV BLD AUTO: 9 FL (ref 9–12.9)
PMV BLD AUTO: 9.2 FL (ref 9–12.9)
PMV BLD AUTO: 9.3 FL (ref 9–12.9)
PMV BLD AUTO: 9.4 FL (ref 9–12.9)
PMV BLD AUTO: 9.7 FL (ref 9–12.9)
PMV BLD AUTO: 9.8 FL (ref 9–12.9)
PMV BLD AUTO: 9.9 FL (ref 9–12.9)
POLYCHROMASIA BLD QL SMEAR: NORMAL
POTASSIUM SERPL-SCNC: 3.4 MMOL/L (ref 3.6–5.5)
POTASSIUM SERPL-SCNC: 3.7 MMOL/L (ref 3.6–5.5)
POTASSIUM SERPL-SCNC: 3.7 MMOL/L (ref 3.6–5.5)
POTASSIUM SERPL-SCNC: 3.9 MMOL/L (ref 3.6–5.5)
POTASSIUM SERPL-SCNC: 4.2 MMOL/L (ref 3.6–5.5)
POTASSIUM SERPL-SCNC: 4.5 MMOL/L (ref 3.6–5.5)
POTASSIUM SERPL-SCNC: 4.6 MMOL/L (ref 3.6–5.5)
POTASSIUM SERPL-SCNC: 4.8 MMOL/L (ref 3.6–5.5)
POTASSIUM SERPL-SCNC: 4.9 MMOL/L (ref 3.6–5.5)
POTASSIUM SERPL-SCNC: 5.2 MMOL/L (ref 3.6–5.5)
POTASSIUM SERPL-SCNC: 5.6 MMOL/L (ref 3.6–5.5)
POTASSIUM SERPL-SCNC: 6.1 MMOL/L (ref 3.6–5.5)
PRODUCT TYPE UPROD: NORMAL
PROT SERPL-MCNC: 7.3 G/DL (ref 6–8.2)
PROT SERPL-MCNC: 8.1 G/DL (ref 6–8.2)
PROT SERPL-MCNC: 8.5 G/DL (ref 6–8.2)
PROT SERPL-MCNC: 8.6 G/DL (ref 6–8.2)
PROT SERPL-MCNC: 8.7 G/DL (ref 6–8.2)
PROT SERPL-MCNC: 9 G/DL (ref 6–8.2)
PROT SERPL-MCNC: 9.1 G/DL (ref 6–8.2)
PROT SERPL-MCNC: 9.2 G/DL (ref 6–8.2)
PROTHROMBIN TIME: 14.9 SEC (ref 12–14.6)
RBC # BLD AUTO: 1.87 M/UL (ref 4.2–5.4)
RBC # BLD AUTO: 2.17 M/UL (ref 4.2–5.4)
RBC # BLD AUTO: 2.32 M/UL (ref 4.2–5.4)
RBC # BLD AUTO: 2.34 M/UL (ref 4.2–5.4)
RBC # BLD AUTO: 2.37 M/UL (ref 4.2–5.4)
RBC # BLD AUTO: 2.54 M/UL (ref 4.2–5.4)
RBC # BLD AUTO: 2.6 M/UL (ref 4.2–5.4)
RBC # BLD AUTO: 2.74 M/UL (ref 4.2–5.4)
RBC # BLD AUTO: 3.04 M/UL (ref 4.2–5.4)
RBC # BLD AUTO: 3.47 M/UL (ref 4.2–5.4)
RBC # BLD AUTO: 3.65 M/UL (ref 4.2–5.4)
RBC # BLD AUTO: 3.7 M/UL (ref 4.2–5.4)
RBC # BLD AUTO: 4 M/UL (ref 4.2–5.4)
RBC # BLD AUTO: 4.05 M/UL (ref 4.2–5.4)
RBC BLD AUTO: NORMAL
RBC BLD AUTO: PRESENT
RH BLD: ABNORMAL
RH BLD: NORMAL
RSV RNA SPEC QL NAA+PROBE: NEGATIVE
RSV RNA SPEC QL NAA+PROBE: NEGATIVE
SARS-COV-2 RNA RESP QL NAA+PROBE: NOTDETECTED
SARS-COV-2 RNA RESP QL NAA+PROBE: NOTDETECTED
SODIUM SERPL-SCNC: 125 MMOL/L (ref 135–145)
SODIUM SERPL-SCNC: 126 MMOL/L (ref 135–145)
SODIUM SERPL-SCNC: 127 MMOL/L (ref 135–145)
SODIUM SERPL-SCNC: 129 MMOL/L (ref 135–145)
SODIUM SERPL-SCNC: 130 MMOL/L (ref 135–145)
SODIUM SERPL-SCNC: 130 MMOL/L (ref 135–145)
SODIUM SERPL-SCNC: 131 MMOL/L (ref 135–145)
SODIUM SERPL-SCNC: 132 MMOL/L (ref 135–145)
SODIUM SERPL-SCNC: 133 MMOL/L (ref 135–145)
SPECIMEN SOURCE: NORMAL
SPECIMEN SOURCE: NORMAL
STAT TRANS INVEST 8506STI: ABNORMAL
TIBC SERPL-MCNC: 139 UG/DL (ref 250–450)
TROPONIN T SERPL-MCNC: 555 NG/L (ref 6–19)
UIBC SERPL-MCNC: 98 UG/DL (ref 110–370)
UNIT STATUS USTAT: NORMAL
WBC # BLD AUTO: 2 K/UL (ref 4.8–10.8)
WBC # BLD AUTO: 2.1 K/UL (ref 4.8–10.8)
WBC # BLD AUTO: 2.2 K/UL (ref 4.8–10.8)
WBC # BLD AUTO: 2.4 K/UL (ref 4.8–10.8)
WBC # BLD AUTO: 2.7 K/UL (ref 4.8–10.8)
WBC # BLD AUTO: 3 K/UL (ref 4.8–10.8)
WBC # BLD AUTO: 3.4 K/UL (ref 4.8–10.8)
WBC # BLD AUTO: 3.6 K/UL (ref 4.8–10.8)
WBC # BLD AUTO: 3.8 K/UL (ref 4.8–10.8)
WBC # BLD AUTO: 4.1 K/UL (ref 4.8–10.8)
WBC # BLD AUTO: 5.4 K/UL (ref 4.8–10.8)
WBC # BLD AUTO: 6.3 K/UL (ref 4.8–10.8)
WBC # BLD AUTO: 6.7 K/UL (ref 4.8–10.8)
WBC # BLD AUTO: 9.7 K/UL (ref 4.8–10.8)

## 2022-01-01 PROCEDURE — A9270 NON-COVERED ITEM OR SERVICE: HCPCS | Performed by: HOSPITALIST

## 2022-01-01 PROCEDURE — 700102 HCHG RX REV CODE 250 W/ 637 OVERRIDE(OP): Performed by: EMERGENCY MEDICINE

## 2022-01-01 PROCEDURE — 160035 HCHG PACU - 1ST 60 MINS PHASE I: Performed by: INTERNAL MEDICINE

## 2022-01-01 PROCEDURE — 700102 HCHG RX REV CODE 250 W/ 637 OVERRIDE(OP): Performed by: HOSPITALIST

## 2022-01-01 PROCEDURE — 36415 COLL VENOUS BLD VENIPUNCTURE: CPT

## 2022-01-01 PROCEDURE — 80053 COMPREHEN METABOLIC PANEL: CPT

## 2022-01-01 PROCEDURE — C9803 HOPD COVID-19 SPEC COLLECT: HCPCS | Performed by: EMERGENCY MEDICINE

## 2022-01-01 PROCEDURE — 72202 X-RAY EXAM SI JOINTS 3/> VWS: CPT

## 2022-01-01 PROCEDURE — 83605 ASSAY OF LACTIC ACID: CPT

## 2022-01-01 PROCEDURE — 85025 COMPLETE CBC W/AUTO DIFF WBC: CPT

## 2022-01-01 PROCEDURE — 86901 BLOOD TYPING SEROLOGIC RH(D): CPT

## 2022-01-01 PROCEDURE — 700111 HCHG RX REV CODE 636 W/ 250 OVERRIDE (IP): Performed by: EMERGENCY MEDICINE

## 2022-01-01 PROCEDURE — 36430 TRANSFUSION BLD/BLD COMPNT: CPT

## 2022-01-01 PROCEDURE — 86902 BLOOD TYPE ANTIGEN DONOR EA: CPT | Mod: 91

## 2022-01-01 PROCEDURE — 0241U HCHG SARS-COV-2 COVID-19 NFCT DS RESP RNA 4 TRGT MIC: CPT

## 2022-01-01 PROCEDURE — 96374 THER/PROPH/DIAG INJ IV PUSH: CPT

## 2022-01-01 PROCEDURE — 700111 HCHG RX REV CODE 636 W/ 250 OVERRIDE (IP): Performed by: INTERNAL MEDICINE

## 2022-01-01 PROCEDURE — 99233 SBSQ HOSP IP/OBS HIGH 50: CPT | Performed by: INTERNAL MEDICINE

## 2022-01-01 PROCEDURE — 160048 HCHG OR STATISTICAL LEVEL 1-5: Performed by: INTERNAL MEDICINE

## 2022-01-01 PROCEDURE — 86880 COOMBS TEST DIRECT: CPT

## 2022-01-01 PROCEDURE — 94760 N-INVAS EAR/PLS OXIMETRY 1: CPT

## 2022-01-01 PROCEDURE — 71045 X-RAY EXAM CHEST 1 VIEW: CPT

## 2022-01-01 PROCEDURE — 700105 HCHG RX REV CODE 258: Performed by: HOSPITALIST

## 2022-01-01 PROCEDURE — 770020 HCHG ROOM/CARE - TELE (206)

## 2022-01-01 PROCEDURE — 99284 EMERGENCY DEPT VISIT MOD MDM: CPT

## 2022-01-01 PROCEDURE — 85018 HEMOGLOBIN: CPT

## 2022-01-01 PROCEDURE — 84484 ASSAY OF TROPONIN QUANT: CPT

## 2022-01-01 PROCEDURE — 700111 HCHG RX REV CODE 636 W/ 250 OVERRIDE (IP): Performed by: HOSPITALIST

## 2022-01-01 PROCEDURE — 82728 ASSAY OF FERRITIN: CPT

## 2022-01-01 PROCEDURE — A9270 NON-COVERED ITEM OR SERVICE: HCPCS | Performed by: INTERNAL MEDICINE

## 2022-01-01 PROCEDURE — 700105 HCHG RX REV CODE 258: Performed by: ANESTHESIOLOGY

## 2022-01-01 PROCEDURE — 160203 HCHG ENDO MINUTES - 1ST 30 MINS LEVEL 4: Performed by: INTERNAL MEDICINE

## 2022-01-01 PROCEDURE — 700105 HCHG RX REV CODE 258: Performed by: INTERNAL MEDICINE

## 2022-01-01 PROCEDURE — 83605 ASSAY OF LACTIC ACID: CPT | Mod: 91

## 2022-01-01 PROCEDURE — 85027 COMPLETE CBC AUTOMATED: CPT

## 2022-01-01 PROCEDURE — 0W3P8ZZ CONTROL BLEEDING IN GASTROINTESTINAL TRACT, VIA NATURAL OR ARTIFICIAL OPENING ENDOSCOPIC: ICD-10-PCS | Performed by: INTERNAL MEDICINE

## 2022-01-01 PROCEDURE — 86902 BLOOD TYPE ANTIGEN DONOR EA: CPT

## 2022-01-01 PROCEDURE — 83690 ASSAY OF LIPASE: CPT

## 2022-01-01 PROCEDURE — 83735 ASSAY OF MAGNESIUM: CPT

## 2022-01-01 PROCEDURE — 160009 HCHG ANES TIME/MIN: Performed by: INTERNAL MEDICINE

## 2022-01-01 PROCEDURE — 85014 HEMATOCRIT: CPT | Mod: 91

## 2022-01-01 PROCEDURE — 96375 TX/PRO/DX INJ NEW DRUG ADDON: CPT

## 2022-01-01 PROCEDURE — 96372 THER/PROPH/DIAG INJ SC/IM: CPT

## 2022-01-01 PROCEDURE — 99223 1ST HOSP IP/OBS HIGH 75: CPT | Performed by: HOSPITALIST

## 2022-01-01 PROCEDURE — 700102 HCHG RX REV CODE 250 W/ 637 OVERRIDE(OP): Performed by: INTERNAL MEDICINE

## 2022-01-01 PROCEDURE — 99285 EMERGENCY DEPT VISIT HI MDM: CPT

## 2022-01-01 PROCEDURE — 86850 RBC ANTIBODY SCREEN: CPT

## 2022-01-01 PROCEDURE — 85610 PROTHROMBIN TIME: CPT

## 2022-01-01 PROCEDURE — 30233N1 TRANSFUSION OF NONAUTOLOGOUS RED BLOOD CELLS INTO PERIPHERAL VEIN, PERCUTANEOUS APPROACH: ICD-10-PCS | Performed by: HOSPITALIST

## 2022-01-01 PROCEDURE — 83550 IRON BINDING TEST: CPT

## 2022-01-01 PROCEDURE — 86922 COMPATIBILITY TEST ANTIGLOB: CPT

## 2022-01-01 PROCEDURE — 160002 HCHG RECOVERY MINUTES (STAT): Performed by: INTERNAL MEDICINE

## 2022-01-01 PROCEDURE — 99283 EMERGENCY DEPT VISIT LOW MDM: CPT

## 2022-01-01 PROCEDURE — 700101 HCHG RX REV CODE 250: Performed by: HOSPITALIST

## 2022-01-01 PROCEDURE — C9113 INJ PANTOPRAZOLE SODIUM, VIA: HCPCS | Performed by: EMERGENCY MEDICINE

## 2022-01-01 PROCEDURE — 82962 GLUCOSE BLOOD TEST: CPT | Mod: 91

## 2022-01-01 PROCEDURE — 86922 COMPATIBILITY TEST ANTIGLOB: CPT | Mod: 91

## 2022-01-01 PROCEDURE — 99255 IP/OBS CONSLTJ NEW/EST HI 80: CPT | Performed by: INTERNAL MEDICINE

## 2022-01-01 PROCEDURE — 306780 HCHG STAT FOR TRANSFUSION PER CASE

## 2022-01-01 PROCEDURE — 700105 HCHG RX REV CODE 258: Performed by: EMERGENCY MEDICINE

## 2022-01-01 PROCEDURE — 90935 HEMODIALYSIS ONE EVALUATION: CPT

## 2022-01-01 PROCEDURE — 93005 ELECTROCARDIOGRAM TRACING: CPT | Performed by: EMERGENCY MEDICINE

## 2022-01-01 PROCEDURE — 700101 HCHG RX REV CODE 250: Performed by: EMERGENCY MEDICINE

## 2022-01-01 PROCEDURE — 86900 BLOOD TYPING SEROLOGIC ABO: CPT

## 2022-01-01 PROCEDURE — 700111 HCHG RX REV CODE 636 W/ 250 OVERRIDE (IP): Performed by: STUDENT IN AN ORGANIZED HEALTH CARE EDUCATION/TRAINING PROGRAM

## 2022-01-01 PROCEDURE — 85007 BL SMEAR W/DIFF WBC COUNT: CPT

## 2022-01-01 PROCEDURE — C9113 INJ PANTOPRAZOLE SODIUM, VIA: HCPCS | Performed by: INTERNAL MEDICINE

## 2022-01-01 PROCEDURE — 70450 CT HEAD/BRAIN W/O DYE: CPT

## 2022-01-01 PROCEDURE — 96376 TX/PRO/DX INJ SAME DRUG ADON: CPT

## 2022-01-01 PROCEDURE — 83540 ASSAY OF IRON: CPT

## 2022-01-01 PROCEDURE — 93010 ELECTROCARDIOGRAM REPORT: CPT | Performed by: INTERNAL MEDICINE

## 2022-01-01 PROCEDURE — 84132 ASSAY OF SERUM POTASSIUM: CPT

## 2022-01-01 PROCEDURE — 80048 BASIC METABOLIC PNL TOTAL CA: CPT

## 2022-01-01 PROCEDURE — 85014 HEMATOCRIT: CPT

## 2022-01-01 PROCEDURE — 85018 HEMOGLOBIN: CPT | Mod: 91

## 2022-01-01 PROCEDURE — A9270 NON-COVERED ITEM OR SERVICE: HCPCS | Performed by: EMERGENCY MEDICINE

## 2022-01-01 PROCEDURE — 86945 BLOOD PRODUCT/IRRADIATION: CPT

## 2022-01-01 PROCEDURE — P9016 RBC LEUKOCYTES REDUCED: HCPCS

## 2022-01-01 PROCEDURE — 84703 CHORIONIC GONADOTROPIN ASSAY: CPT

## 2022-01-01 PROCEDURE — 99239 HOSP IP/OBS DSCHRG MGMT >30: CPT | Performed by: INTERNAL MEDICINE

## 2022-01-01 PROCEDURE — 93005 ELECTROCARDIOGRAM TRACING: CPT | Performed by: HOSPITALIST

## 2022-01-01 PROCEDURE — P9016 RBC LEUKOCYTES REDUCED: HCPCS | Mod: 91

## 2022-01-01 PROCEDURE — 770006 HCHG ROOM/CARE - MED/SURG/GYN SEMI*

## 2022-01-01 PROCEDURE — 74018 RADEX ABDOMEN 1 VIEW: CPT

## 2022-01-01 PROCEDURE — 00813 ANES UPR LWR GI NDSC PX: CPT | Performed by: ANESTHESIOLOGY

## 2022-01-01 PROCEDURE — 700111 HCHG RX REV CODE 636 W/ 250 OVERRIDE (IP): Performed by: ANESTHESIOLOGY

## 2022-01-01 PROCEDURE — 90837 PSYTX W PT 60 MINUTES: CPT | Performed by: PSYCHOLOGIST

## 2022-01-01 PROCEDURE — 700105 HCHG RX REV CODE 258

## 2022-01-01 PROCEDURE — 302449 STATCHG TRIAGE ONLY (STATISTIC)

## 2022-01-01 PROCEDURE — C9113 INJ PANTOPRAZOLE SODIUM, VIA: HCPCS | Performed by: STUDENT IN AN ORGANIZED HEALTH CARE EDUCATION/TRAINING PROGRAM

## 2022-01-01 PROCEDURE — 700111 HCHG RX REV CODE 636 W/ 250 OVERRIDE (IP)

## 2022-01-01 PROCEDURE — 85730 THROMBOPLASTIN TIME PARTIAL: CPT

## 2022-01-01 PROCEDURE — 86141 C-REACTIVE PROTEIN HS: CPT

## 2022-01-01 PROCEDURE — 5A1D70Z PERFORMANCE OF URINARY FILTRATION, INTERMITTENT, LESS THAN 6 HOURS PER DAY: ICD-10-PCS | Performed by: INTERNAL MEDICINE

## 2022-01-01 RX ORDER — SODIUM CHLORIDE 9 MG/ML
INJECTION, SOLUTION INTRAVENOUS CONTINUOUS
Status: DISCONTINUED | OUTPATIENT
Start: 2022-01-01 | End: 2022-01-01 | Stop reason: HOSPADM

## 2022-01-01 RX ORDER — ONDANSETRON 2 MG/ML
4 INJECTION INTRAMUSCULAR; INTRAVENOUS ONCE
Status: COMPLETED | OUTPATIENT
Start: 2022-01-01 | End: 2022-01-01

## 2022-01-01 RX ORDER — ACETAMINOPHEN 325 MG/1
650 TABLET ORAL ONCE
Status: CANCELLED | OUTPATIENT
Start: 2022-01-01

## 2022-01-01 RX ORDER — 0.9 % SODIUM CHLORIDE 0.9 %
VIAL (ML) INJECTION PRN
Status: CANCELLED | OUTPATIENT
Start: 2022-01-01

## 2022-01-01 RX ORDER — BUTALBITAL, ACETAMINOPHEN AND CAFFEINE 50; 325; 40 MG/1; MG/1; MG/1
1 TABLET ORAL EVERY 6 HOURS PRN
Status: DISCONTINUED | OUTPATIENT
Start: 2022-01-01 | End: 2022-01-01 | Stop reason: HOSPADM

## 2022-01-01 RX ORDER — 0.9 % SODIUM CHLORIDE 0.9 %
10 VIAL (ML) INJECTION PRN
Status: CANCELLED | OUTPATIENT
Start: 2022-01-01

## 2022-01-01 RX ORDER — CLONIDINE 0.2 MG/24H
1 PATCH, EXTENDED RELEASE TRANSDERMAL
Status: DISCONTINUED | OUTPATIENT
Start: 2022-01-01 | End: 2022-01-01

## 2022-01-01 RX ORDER — DIPHENHYDRAMINE HYDROCHLORIDE 50 MG/ML
25 INJECTION INTRAMUSCULAR; INTRAVENOUS PRN
Status: CANCELLED
Start: 2022-01-01

## 2022-01-01 RX ORDER — HYDROMORPHONE HYDROCHLORIDE 2 MG/1
2 TABLET ORAL
Status: DISCONTINUED | OUTPATIENT
Start: 2022-01-01 | End: 2022-01-01

## 2022-01-01 RX ORDER — ATENOLOL 25 MG/1
25 TABLET ORAL EVERY MORNING
Status: DISCONTINUED | OUTPATIENT
Start: 2022-01-01 | End: 2022-01-01 | Stop reason: HOSPADM

## 2022-01-01 RX ORDER — ENEMA 19; 7 G/133ML; G/133ML
2 ENEMA RECTAL ONCE
Status: DISCONTINUED | OUTPATIENT
Start: 2022-01-01 | End: 2022-01-01

## 2022-01-01 RX ORDER — AMLODIPINE BESYLATE 5 MG/1
5 TABLET ORAL
Status: DISCONTINUED | OUTPATIENT
Start: 2022-01-01 | End: 2022-01-01 | Stop reason: HOSPADM

## 2022-01-01 RX ORDER — HYDROMORPHONE HYDROCHLORIDE 1 MG/ML
0.5 INJECTION, SOLUTION INTRAMUSCULAR; INTRAVENOUS; SUBCUTANEOUS EVERY 8 HOURS PRN
Status: DISCONTINUED | OUTPATIENT
Start: 2022-01-01 | End: 2022-01-01 | Stop reason: HOSPADM

## 2022-01-01 RX ORDER — 0.9 % SODIUM CHLORIDE 0.9 %
3 VIAL (ML) INJECTION PRN
Status: CANCELLED | OUTPATIENT
Start: 2022-01-01

## 2022-01-01 RX ORDER — HYDROMORPHONE HYDROCHLORIDE 1 MG/ML
1 INJECTION, SOLUTION INTRAMUSCULAR; INTRAVENOUS; SUBCUTANEOUS
Status: DISCONTINUED | OUTPATIENT
Start: 2022-01-01 | End: 2022-01-01 | Stop reason: HOSPADM

## 2022-01-01 RX ORDER — HYDROXYCHLOROQUINE SULFATE 200 MG/1
200 TABLET, FILM COATED ORAL
Status: DISCONTINUED | OUTPATIENT
Start: 2022-01-01 | End: 2022-01-01 | Stop reason: HOSPADM

## 2022-01-01 RX ORDER — ACETAMINOPHEN 325 MG/1
650 TABLET ORAL
Status: DISCONTINUED | OUTPATIENT
Start: 2022-01-01 | End: 2022-01-01

## 2022-01-01 RX ORDER — CHOLECALCIFEROL (VITAMIN D3) 125 MCG
5 CAPSULE ORAL NIGHTLY
Status: DISCONTINUED | OUTPATIENT
Start: 2022-01-01 | End: 2022-01-01 | Stop reason: HOSPADM

## 2022-01-01 RX ORDER — PROMETHAZINE HYDROCHLORIDE 25 MG/1
25 TABLET ORAL EVERY 6 HOURS PRN
Status: DISCONTINUED | OUTPATIENT
Start: 2022-01-01 | End: 2022-01-01 | Stop reason: HOSPADM

## 2022-01-01 RX ORDER — LABETALOL HYDROCHLORIDE 5 MG/ML
10 INJECTION, SOLUTION INTRAVENOUS EVERY 6 HOURS PRN
Status: DISCONTINUED | OUTPATIENT
Start: 2022-01-01 | End: 2022-01-01 | Stop reason: HOSPADM

## 2022-01-01 RX ORDER — DEXAMETHASONE SODIUM PHOSPHATE 4 MG/ML
4 INJECTION, SOLUTION INTRA-ARTICULAR; INTRALESIONAL; INTRAMUSCULAR; INTRAVENOUS; SOFT TISSUE ONCE
Status: COMPLETED | OUTPATIENT
Start: 2022-01-01 | End: 2022-01-01

## 2022-01-01 RX ORDER — HEPARIN SODIUM (PORCINE) LOCK FLUSH IV SOLN 100 UNIT/ML 100 UNIT/ML
500 SOLUTION INTRAVENOUS PRN
Status: CANCELLED | OUTPATIENT
Start: 2022-01-01

## 2022-01-01 RX ORDER — PROMETHAZINE HYDROCHLORIDE 25 MG/1
12.5 SUPPOSITORY RECTAL EVERY 6 HOURS PRN
Status: DISCONTINUED | OUTPATIENT
Start: 2022-01-01 | End: 2022-01-01 | Stop reason: HOSPADM

## 2022-01-01 RX ORDER — DIPHENHYDRAMINE HYDROCHLORIDE 50 MG/ML
50 INJECTION INTRAMUSCULAR; INTRAVENOUS ONCE
Status: COMPLETED | OUTPATIENT
Start: 2022-01-01 | End: 2022-01-01

## 2022-01-01 RX ORDER — CLONIDINE HYDROCHLORIDE 0.1 MG/1
0.3 TABLET ORAL 2 TIMES DAILY
Status: DISCONTINUED | OUTPATIENT
Start: 2022-01-01 | End: 2022-01-01 | Stop reason: HOSPADM

## 2022-01-01 RX ORDER — DIPHENHYDRAMINE HYDROCHLORIDE 50 MG/ML
25 INJECTION INTRAMUSCULAR; INTRAVENOUS PRN
Status: CANCELLED | OUTPATIENT
Start: 2022-01-01

## 2022-01-01 RX ORDER — MIDAZOLAM HYDROCHLORIDE 1 MG/ML
INJECTION INTRAMUSCULAR; INTRAVENOUS PRN
Status: DISCONTINUED | OUTPATIENT
Start: 2022-01-01 | End: 2022-01-01 | Stop reason: HOSPADM

## 2022-01-01 RX ORDER — ACETAMINOPHEN 325 MG/1
650 TABLET ORAL EVERY 6 HOURS PRN
Status: DISCONTINUED | OUTPATIENT
Start: 2022-01-01 | End: 2022-01-01 | Stop reason: HOSPADM

## 2022-01-01 RX ORDER — ACETAMINOPHEN 325 MG/1
650 TABLET ORAL ONCE
Status: COMPLETED | OUTPATIENT
Start: 2022-01-01 | End: 2022-01-01

## 2022-01-01 RX ORDER — HYDROMORPHONE HYDROCHLORIDE 1 MG/ML
1 INJECTION, SOLUTION INTRAMUSCULAR; INTRAVENOUS; SUBCUTANEOUS ONCE
Status: COMPLETED | OUTPATIENT
Start: 2022-01-01 | End: 2022-01-01

## 2022-01-01 RX ORDER — ONDANSETRON 2 MG/ML
4 INJECTION INTRAMUSCULAR; INTRAVENOUS
Status: DISCONTINUED | OUTPATIENT
Start: 2022-01-01 | End: 2022-01-01 | Stop reason: HOSPADM

## 2022-01-01 RX ORDER — ACETAMINOPHEN 325 MG/1
650 TABLET ORAL PRN
Status: CANCELLED | OUTPATIENT
Start: 2022-01-01

## 2022-01-01 RX ORDER — CLONIDINE 0.2 MG/24H
PATCH, EXTENDED RELEASE TRANSDERMAL
Qty: 4 PATCH | Refills: 0 | Status: SHIPPED | OUTPATIENT
Start: 2022-01-01 | End: 2023-01-01

## 2022-01-01 RX ORDER — ONDANSETRON 2 MG/ML
4 INJECTION INTRAMUSCULAR; INTRAVENOUS EVERY 4 HOURS PRN
Status: DISCONTINUED | OUTPATIENT
Start: 2022-01-01 | End: 2022-01-01 | Stop reason: HOSPADM

## 2022-01-01 RX ORDER — PANTOPRAZOLE SODIUM 40 MG/10ML
80 INJECTION, POWDER, LYOPHILIZED, FOR SOLUTION INTRAVENOUS ONCE
Status: DISCONTINUED | OUTPATIENT
Start: 2022-01-01 | End: 2022-01-01

## 2022-01-01 RX ORDER — DIPHENHYDRAMINE HYDROCHLORIDE 50 MG/ML
25 INJECTION INTRAMUSCULAR; INTRAVENOUS PRN
Status: COMPLETED | OUTPATIENT
Start: 2022-01-01 | End: 2022-01-01

## 2022-01-01 RX ORDER — SODIUM CHLORIDE 9 MG/ML
INJECTION, SOLUTION INTRAVENOUS CONTINUOUS
Status: ACTIVE | OUTPATIENT
Start: 2022-01-01 | End: 2022-01-01

## 2022-01-01 RX ORDER — SODIUM CHLORIDE 9 MG/ML
INJECTION, SOLUTION INTRAVENOUS
Status: DISCONTINUED | OUTPATIENT
Start: 2022-01-01 | End: 2022-01-01 | Stop reason: SURG

## 2022-01-01 RX ORDER — ACETAMINOPHEN 325 MG/1
650 TABLET ORAL
Status: COMPLETED | OUTPATIENT
Start: 2022-01-01 | End: 2022-01-01

## 2022-01-01 RX ORDER — DIPHENHYDRAMINE HYDROCHLORIDE 50 MG/ML
25 INJECTION INTRAMUSCULAR; INTRAVENOUS ONCE
Status: COMPLETED | OUTPATIENT
Start: 2022-01-01 | End: 2022-01-01

## 2022-01-01 RX ORDER — PREDNISONE 5 MG/1
5 TABLET ORAL EVERY MORNING
Status: DISCONTINUED | OUTPATIENT
Start: 2022-01-01 | End: 2022-01-01 | Stop reason: HOSPADM

## 2022-01-01 RX ORDER — DIPHENHYDRAMINE HYDROCHLORIDE 50 MG/ML
12.5 INJECTION INTRAMUSCULAR; INTRAVENOUS
Status: COMPLETED | OUTPATIENT
Start: 2022-01-01 | End: 2022-01-01

## 2022-01-01 RX ORDER — POLYETHYLENE GLYCOL 3350 17 G/17G
1 POWDER, FOR SOLUTION ORAL DAILY
Status: DISCONTINUED | OUTPATIENT
Start: 2022-01-01 | End: 2022-01-01 | Stop reason: HOSPADM

## 2022-01-01 RX ORDER — SODIUM CHLORIDE 9 MG/ML
INJECTION, SOLUTION INTRAVENOUS CONTINUOUS
Status: CANCELLED | OUTPATIENT
Start: 2022-01-01

## 2022-01-01 RX ORDER — CLONIDINE 0.2 MG/24H
1 PATCH, EXTENDED RELEASE TRANSDERMAL
Status: DISCONTINUED | OUTPATIENT
Start: 2022-01-01 | End: 2022-01-01 | Stop reason: HOSPADM

## 2022-01-01 RX ORDER — MYCOPHENOLATE MOFETIL 250 MG/1
500 CAPSULE ORAL
Status: DISCONTINUED | OUTPATIENT
Start: 2022-01-01 | End: 2022-01-01 | Stop reason: HOSPADM

## 2022-01-01 RX ORDER — DIPHENHYDRAMINE HYDROCHLORIDE 50 MG/ML
25 INJECTION INTRAMUSCULAR; INTRAVENOUS PRN
Status: DISCONTINUED | OUTPATIENT
Start: 2022-01-01 | End: 2022-01-01 | Stop reason: HOSPADM

## 2022-01-01 RX ORDER — DIAZEPAM 5 MG/ML
2.5 INJECTION, SOLUTION INTRAMUSCULAR; INTRAVENOUS ONCE
Status: COMPLETED | OUTPATIENT
Start: 2022-01-01 | End: 2022-01-01

## 2022-01-01 RX ORDER — LORAZEPAM 1 MG/1
1 TABLET ORAL ONCE
Status: COMPLETED | OUTPATIENT
Start: 2022-01-01 | End: 2022-01-01

## 2022-01-01 RX ORDER — LORAZEPAM 2 MG/ML
2 INJECTION INTRAMUSCULAR ONCE
Status: DISCONTINUED | OUTPATIENT
Start: 2022-01-01 | End: 2022-01-01

## 2022-01-01 RX ORDER — ALBUTEROL SULFATE 2.5 MG/3ML
2.5 SOLUTION RESPIRATORY (INHALATION)
Status: DISCONTINUED | OUTPATIENT
Start: 2022-01-01 | End: 2022-01-01 | Stop reason: HOSPADM

## 2022-01-01 RX ORDER — SODIUM CHLORIDE 9 MG/ML
INJECTION, SOLUTION INTRAVENOUS ONCE
Status: DISCONTINUED | OUTPATIENT
Start: 2022-01-01 | End: 2022-01-01 | Stop reason: HOSPADM

## 2022-01-01 RX ORDER — LORAZEPAM 0.5 MG/1
0.5 TABLET ORAL EVERY 4 HOURS PRN
Status: DISCONTINUED | OUTPATIENT
Start: 2022-01-01 | End: 2022-01-01 | Stop reason: HOSPADM

## 2022-01-01 RX ORDER — ENEMA 19; 7 G/133ML; G/133ML
1 ENEMA RECTAL ONCE
Status: ACTIVE | OUTPATIENT
Start: 2022-01-01 | End: 2022-01-01

## 2022-01-01 RX ORDER — MYCOPHENOLIC ACID 360 MG/1
360 TABLET, DELAYED RELEASE ORAL
Status: DISCONTINUED | OUTPATIENT
Start: 2022-01-01 | End: 2022-01-01

## 2022-01-01 RX ORDER — ENEMA 19; 7 G/133ML; G/133ML
1 ENEMA RECTAL ONCE
Status: DISPENSED | OUTPATIENT
Start: 2022-01-01 | End: 2022-01-01

## 2022-01-01 RX ORDER — AMOXICILLIN 250 MG
2 CAPSULE ORAL 2 TIMES DAILY
Status: DISCONTINUED | OUTPATIENT
Start: 2022-01-01 | End: 2022-01-01

## 2022-01-01 RX ORDER — HYDROMORPHONE HYDROCHLORIDE 1 MG/ML
0.5 INJECTION, SOLUTION INTRAMUSCULAR; INTRAVENOUS; SUBCUTANEOUS ONCE
Status: COMPLETED | OUTPATIENT
Start: 2022-01-01 | End: 2022-01-01

## 2022-01-01 RX ORDER — HYDROMORPHONE HYDROCHLORIDE 2 MG/1
2 TABLET ORAL ONCE
Status: COMPLETED | OUTPATIENT
Start: 2022-01-01 | End: 2022-01-01

## 2022-01-01 RX ORDER — HYDROMORPHONE HYDROCHLORIDE 1 MG/ML
INJECTION, SOLUTION INTRAMUSCULAR; INTRAVENOUS; SUBCUTANEOUS
Status: COMPLETED
Start: 2022-01-01 | End: 2022-01-01

## 2022-01-01 RX ORDER — PEG-3350, SODIUM SULFATE, SODIUM CHLORIDE, POTASSIUM CHLORIDE, SODIUM ASCORBATE AND ASCORBIC ACID 7.5-2.691G
100 KIT ORAL ONCE
Status: DISCONTINUED | OUTPATIENT
Start: 2022-01-01 | End: 2022-01-01

## 2022-01-01 RX ORDER — POLYETHYLENE GLYCOL 3350 17 G/17G
1 POWDER, FOR SOLUTION ORAL
Status: DISCONTINUED | OUTPATIENT
Start: 2022-01-01 | End: 2022-01-01

## 2022-01-01 RX ORDER — LABETALOL HYDROCHLORIDE 5 MG/ML
10 INJECTION, SOLUTION INTRAVENOUS EVERY 6 HOURS PRN
Status: DISCONTINUED | OUTPATIENT
Start: 2022-01-01 | End: 2022-01-01

## 2022-01-01 RX ORDER — DIPHENHYDRAMINE HYDROCHLORIDE 50 MG/ML
25-50 INJECTION INTRAMUSCULAR; INTRAVENOUS
Status: COMPLETED | OUTPATIENT
Start: 2022-01-01 | End: 2022-01-01

## 2022-01-01 RX ORDER — PEG-3350, SODIUM SULFATE, SODIUM CHLORIDE, POTASSIUM CHLORIDE, SODIUM ASCORBATE AND ASCORBIC ACID 7.5-2.691G
100 KIT ORAL ONCE
Status: ACTIVE | OUTPATIENT
Start: 2022-01-01 | End: 2022-01-01

## 2022-01-01 RX ORDER — PANTOPRAZOLE SODIUM 40 MG/10ML
40 INJECTION, POWDER, LYOPHILIZED, FOR SOLUTION INTRAVENOUS DAILY
Status: DISCONTINUED | OUTPATIENT
Start: 2022-01-01 | End: 2022-01-01 | Stop reason: HOSPADM

## 2022-01-01 RX ORDER — MIDAZOLAM HYDROCHLORIDE 1 MG/ML
1 INJECTION INTRAMUSCULAR; INTRAVENOUS ONCE
Status: COMPLETED | OUTPATIENT
Start: 2022-01-01 | End: 2022-01-01

## 2022-01-01 RX ORDER — DIPHENHYDRAMINE HCL 25 MG
25 TABLET ORAL
Status: DISPENSED | OUTPATIENT
Start: 2022-01-01 | End: 2022-01-01

## 2022-01-01 RX ORDER — HYDROMORPHONE HYDROCHLORIDE 1 MG/ML
1 INJECTION, SOLUTION INTRAMUSCULAR; INTRAVENOUS; SUBCUTANEOUS
Status: ACTIVE | OUTPATIENT
Start: 2022-01-01 | End: 2022-01-01

## 2022-01-01 RX ORDER — GABAPENTIN 100 MG/1
200 CAPSULE ORAL ONCE
Status: DISCONTINUED | OUTPATIENT
Start: 2022-01-01 | End: 2022-01-01 | Stop reason: HOSPADM

## 2022-01-01 RX ORDER — BISACODYL 10 MG
10 SUPPOSITORY, RECTAL RECTAL
Status: DISCONTINUED | OUTPATIENT
Start: 2022-01-01 | End: 2022-01-01

## 2022-01-01 RX ORDER — KETOROLAC TROMETHAMINE 30 MG/ML
15 INJECTION, SOLUTION INTRAMUSCULAR; INTRAVENOUS ONCE
Status: COMPLETED | OUTPATIENT
Start: 2022-01-01 | End: 2022-01-01

## 2022-01-01 RX ORDER — OMEPRAZOLE 20 MG/1
20 CAPSULE, DELAYED RELEASE ORAL 2 TIMES DAILY
Status: DISCONTINUED | OUTPATIENT
Start: 2022-01-01 | End: 2022-01-01

## 2022-01-01 RX ORDER — DIPHENHYDRAMINE HYDROCHLORIDE 50 MG/ML
25 INJECTION INTRAMUSCULAR; INTRAVENOUS EVERY 6 HOURS PRN
Status: COMPLETED | OUTPATIENT
Start: 2022-01-01 | End: 2022-01-01

## 2022-01-01 RX ADMIN — MYCOPHENOLATE MOFETIL 500 MG: 250 CAPSULE ORAL at 17:56

## 2022-01-01 RX ADMIN — ACETAMINOPHEN 650 MG: 325 TABLET ORAL at 15:52

## 2022-01-01 RX ADMIN — LORAZEPAM 0.5 MG: 0.5 TABLET ORAL at 18:55

## 2022-01-01 RX ADMIN — DEXTROSE MONOHYDRATE 25 G: 100 INJECTION, SOLUTION INTRAVENOUS at 18:16

## 2022-01-01 RX ADMIN — LORAZEPAM 1 MG: 1 TABLET ORAL at 23:36

## 2022-01-01 RX ADMIN — ACETAMINOPHEN 650 MG: 325 TABLET, FILM COATED ORAL at 03:59

## 2022-01-01 RX ADMIN — SODIUM CHLORIDE 250 MG: 9 INJECTION, SOLUTION INTRAVENOUS at 17:56

## 2022-01-01 RX ADMIN — ONDANSETRON 4 MG: 2 INJECTION INTRAMUSCULAR; INTRAVENOUS at 14:51

## 2022-01-01 RX ADMIN — DIPHENHYDRAMINE HYDROCHLORIDE 25 MG: 50 INJECTION, SOLUTION INTRAMUSCULAR; INTRAVENOUS at 18:45

## 2022-01-01 RX ADMIN — ONDANSETRON 4 MG: 2 INJECTION INTRAMUSCULAR; INTRAVENOUS at 14:07

## 2022-01-01 RX ADMIN — LABETALOL HYDROCHLORIDE 10 MG: 5 INJECTION, SOLUTION INTRAVENOUS at 18:48

## 2022-01-01 RX ADMIN — DIPHENHYDRAMINE HYDROCHLORIDE 25 MG: 50 INJECTION INTRAMUSCULAR; INTRAVENOUS at 23:54

## 2022-01-01 RX ADMIN — SODIUM PHOSPHATE 266 ML: 7; 19 ENEMA RECTAL at 08:00

## 2022-01-01 RX ADMIN — PANTOPRAZOLE SODIUM 8 MG/HR: 40 INJECTION, POWDER, FOR SOLUTION INTRAVENOUS at 11:40

## 2022-01-01 RX ADMIN — CLONIDINE HYDROCHLORIDE 0.3 MG: 0.1 TABLET ORAL at 21:43

## 2022-01-01 RX ADMIN — PROMETHAZINE HYDROCHLORIDE 25 MG: 25 TABLET ORAL at 15:42

## 2022-01-01 RX ADMIN — DIPHENHYDRAMINE HYDROCHLORIDE 25 MG: 50 INJECTION, SOLUTION INTRAMUSCULAR; INTRAVENOUS at 22:09

## 2022-01-01 RX ADMIN — HYDROXYCHLOROQUINE SULFATE 200 MG: 200 TABLET, FILM COATED ORAL at 09:16

## 2022-01-01 RX ADMIN — HYDROXYCHLOROQUINE SULFATE 200 MG: 200 TABLET, FILM COATED ORAL at 20:36

## 2022-01-01 RX ADMIN — ONDANSETRON 4 MG: 2 INJECTION INTRAMUSCULAR; INTRAVENOUS at 07:30

## 2022-01-01 RX ADMIN — ONDANSETRON 4 MG: 2 INJECTION INTRAMUSCULAR; INTRAVENOUS at 18:36

## 2022-01-01 RX ADMIN — KETOROLAC TROMETHAMINE 15 MG: 30 INJECTION, SOLUTION INTRAMUSCULAR; INTRAVENOUS at 07:17

## 2022-01-01 RX ADMIN — HYDROMORPHONE HYDROCHLORIDE 0.5 MG: 1 INJECTION, SOLUTION INTRAMUSCULAR; INTRAVENOUS; SUBCUTANEOUS at 20:50

## 2022-01-01 RX ADMIN — DIAZEPAM 2.5 MG: 5 INJECTION, SOLUTION INTRAMUSCULAR; INTRAVENOUS at 10:56

## 2022-01-01 RX ADMIN — LORAZEPAM 0.5 MG: 0.5 TABLET ORAL at 16:53

## 2022-01-01 RX ADMIN — ACETAMINOPHEN 650 MG: 325 TABLET, FILM COATED ORAL at 23:54

## 2022-01-01 RX ADMIN — DIPHENHYDRAMINE HYDROCHLORIDE 25 MG: 50 INJECTION INTRAMUSCULAR; INTRAVENOUS at 15:38

## 2022-01-01 RX ADMIN — ONDANSETRON 4 MG: 2 INJECTION INTRAMUSCULAR; INTRAVENOUS at 14:41

## 2022-01-01 RX ADMIN — MYCOPHENOLATE MOFETIL 500 MG: 250 CAPSULE ORAL at 21:34

## 2022-01-01 RX ADMIN — PREDNISONE 5 MG: 5 TABLET ORAL at 17:56

## 2022-01-01 RX ADMIN — HYDROMORPHONE HYDROCHLORIDE 2 MG: 2 TABLET ORAL at 23:36

## 2022-01-01 RX ADMIN — AMLODIPINE BESYLATE 5 MG: 5 TABLET ORAL at 20:36

## 2022-01-01 RX ADMIN — DEXTROSE MONOHYDRATE: 100 INJECTION, SOLUTION INTRAVENOUS at 14:55

## 2022-01-01 RX ADMIN — ONDANSETRON 4 MG: 2 INJECTION INTRAMUSCULAR; INTRAVENOUS at 08:20

## 2022-01-01 RX ADMIN — SODIUM CHLORIDE 250 MG: 9 INJECTION, SOLUTION INTRAVENOUS at 11:40

## 2022-01-01 RX ADMIN — DIPHENHYDRAMINE HYDROCHLORIDE 50 MG: 50 INJECTION INTRAMUSCULAR; INTRAVENOUS at 10:51

## 2022-01-01 RX ADMIN — PROMETHAZINE HYDROCHLORIDE 12.5 MG: 25 SUPPOSITORY RECTAL at 14:52

## 2022-01-01 RX ADMIN — KETAMINE HYDROCHLORIDE 25 MG: 10 INJECTION, SOLUTION INTRAMUSCULAR; INTRAVENOUS at 07:25

## 2022-01-01 RX ADMIN — HYDROMORPHONE HYDROCHLORIDE 0.5 MG: 1 INJECTION, SOLUTION INTRAMUSCULAR; INTRAVENOUS; SUBCUTANEOUS at 20:39

## 2022-01-01 RX ADMIN — OMEPRAZOLE 20 MG: 20 CAPSULE, DELAYED RELEASE ORAL at 09:16

## 2022-01-01 RX ADMIN — ACETAMINOPHEN 650 MG: 325 TABLET ORAL at 14:10

## 2022-01-01 RX ADMIN — LORAZEPAM 1 MG: 1 TABLET ORAL at 00:41

## 2022-01-01 RX ADMIN — ONDANSETRON 4 MG: 2 INJECTION INTRAMUSCULAR; INTRAVENOUS at 11:47

## 2022-01-01 RX ADMIN — ACETAMINOPHEN 650 MG: 325 TABLET, FILM COATED ORAL at 15:20

## 2022-01-01 RX ADMIN — POLYETHYLENE GLYCOL 3350 1 PACKET: 17 POWDER, FOR SOLUTION ORAL at 13:09

## 2022-01-01 RX ADMIN — SODIUM BICARBONATE 50 MEQ: 84 INJECTION, SOLUTION INTRAVENOUS at 07:41

## 2022-01-01 RX ADMIN — PANTOPRAZOLE SODIUM 80 MG: 40 INJECTION, POWDER, FOR SOLUTION INTRAVENOUS at 08:34

## 2022-01-01 RX ADMIN — HYDROMORPHONE HYDROCHLORIDE 1 MG: 1 INJECTION, SOLUTION INTRAMUSCULAR; INTRAVENOUS; SUBCUTANEOUS at 18:19

## 2022-01-01 RX ADMIN — AMLODIPINE BESYLATE 5 MG: 5 TABLET ORAL at 21:35

## 2022-01-01 RX ADMIN — DIPHENHYDRAMINE HYDROCHLORIDE 25 MG: 50 INJECTION INTRAMUSCULAR; INTRAVENOUS at 18:10

## 2022-01-01 RX ADMIN — DIPHENHYDRAMINE HYDROCHLORIDE 12.5 MG: 50 INJECTION INTRAMUSCULAR; INTRAVENOUS at 13:08

## 2022-01-01 RX ADMIN — SODIUM CHLORIDE: 9 INJECTION, SOLUTION INTRAVENOUS at 13:26

## 2022-01-01 RX ADMIN — EPOETIN ALFA-EPBX 3000 UNITS: 3000 INJECTION, SOLUTION INTRAVENOUS; SUBCUTANEOUS at 17:00

## 2022-01-01 RX ADMIN — MYCOPHENOLATE MOFETIL 500 MG: 250 CAPSULE ORAL at 20:36

## 2022-01-01 RX ADMIN — DIPHENHYDRAMINE HYDROCHLORIDE 25 MG: 50 INJECTION INTRAMUSCULAR; INTRAVENOUS at 17:34

## 2022-01-01 RX ADMIN — ACETAMINOPHEN 650 MG: 325 TABLET, FILM COATED ORAL at 13:08

## 2022-01-01 RX ADMIN — ONDANSETRON 4 MG: 2 INJECTION INTRAMUSCULAR; INTRAVENOUS at 04:52

## 2022-01-01 RX ADMIN — PANTOPRAZOLE SODIUM 40 MG: 40 INJECTION, POWDER, FOR SOLUTION INTRAVENOUS at 05:24

## 2022-01-01 RX ADMIN — PREDNISONE 5 MG: 5 TABLET ORAL at 08:50

## 2022-01-01 RX ADMIN — DIPHENHYDRAMINE HYDROCHLORIDE 25 MG: 50 INJECTION, SOLUTION INTRAMUSCULAR; INTRAVENOUS at 14:12

## 2022-01-01 RX ADMIN — DIPHENHYDRAMINE HYDROCHLORIDE 50 MG: 50 INJECTION, SOLUTION INTRAMUSCULAR; INTRAVENOUS at 22:33

## 2022-01-01 RX ADMIN — DIPHENHYDRAMINE HYDROCHLORIDE 25 MG: 50 INJECTION INTRAMUSCULAR; INTRAVENOUS at 16:33

## 2022-01-01 RX ADMIN — ONDANSETRON 4 MG: 2 INJECTION INTRAMUSCULAR; INTRAVENOUS at 18:24

## 2022-01-01 RX ADMIN — MIDAZOLAM HYDROCHLORIDE 1 MG: 1 INJECTION, SOLUTION INTRAMUSCULAR; INTRAVENOUS at 09:40

## 2022-01-01 RX ADMIN — ONDANSETRON 4 MG: 2 INJECTION INTRAMUSCULAR; INTRAVENOUS at 05:58

## 2022-01-01 RX ADMIN — ONDANSETRON 4 MG: 2 INJECTION INTRAMUSCULAR; INTRAVENOUS at 20:22

## 2022-01-01 RX ADMIN — HYDROXYCHLOROQUINE SULFATE 200 MG: 200 TABLET, FILM COATED ORAL at 21:35

## 2022-01-01 RX ADMIN — DIPHENHYDRAMINE HYDROCHLORIDE 25 MG: 50 INJECTION, SOLUTION INTRAMUSCULAR; INTRAVENOUS at 15:57

## 2022-01-01 RX ADMIN — DIPHENHYDRAMINE HYDROCHLORIDE 25 MG: 50 INJECTION INTRAMUSCULAR; INTRAVENOUS at 04:00

## 2022-01-01 RX ADMIN — DEXAMETHASONE SODIUM PHOSPHATE 4 MG: 4 INJECTION, SOLUTION INTRA-ARTICULAR; INTRALESIONAL; INTRAMUSCULAR; INTRAVENOUS; SOFT TISSUE at 07:18

## 2022-01-01 RX ADMIN — HYDROMORPHONE HYDROCHLORIDE 0.5 MG: 1 INJECTION, SOLUTION INTRAMUSCULAR; INTRAVENOUS; SUBCUTANEOUS at 22:33

## 2022-01-01 RX ADMIN — HYDROMORPHONE HYDROCHLORIDE 0.5 MG: 1 INJECTION, SOLUTION INTRAMUSCULAR; INTRAVENOUS; SUBCUTANEOUS at 12:39

## 2022-01-01 RX ADMIN — ONDANSETRON 4 MG: 2 INJECTION INTRAMUSCULAR; INTRAVENOUS at 10:42

## 2022-01-01 RX ADMIN — OMEPRAZOLE 20 MG: 20 CAPSULE, DELAYED RELEASE ORAL at 17:56

## 2022-01-01 RX ADMIN — ONDANSETRON 4 MG: 2 INJECTION INTRAMUSCULAR; INTRAVENOUS at 08:00

## 2022-01-01 RX ADMIN — MIDAZOLAM HYDROCHLORIDE 1 MG: 1 INJECTION, SOLUTION INTRAMUSCULAR; INTRAVENOUS at 09:47

## 2022-01-01 RX ADMIN — SODIUM CHLORIDE 250 MG: 9 INJECTION, SOLUTION INTRAVENOUS at 05:22

## 2022-01-01 RX ADMIN — HYDROMORPHONE HYDROCHLORIDE 1 MG: 1 INJECTION, SOLUTION INTRAMUSCULAR; INTRAVENOUS; SUBCUTANEOUS at 08:20

## 2022-01-01 RX ADMIN — DIPHENHYDRAMINE HYDROCHLORIDE 25 MG: 50 INJECTION INTRAMUSCULAR; INTRAVENOUS at 13:49

## 2022-01-01 RX ADMIN — SODIUM CHLORIDE: 9 INJECTION, SOLUTION INTRAVENOUS at 09:41

## 2022-01-01 RX ADMIN — ONDANSETRON 4 MG: 2 INJECTION INTRAMUSCULAR; INTRAVENOUS at 23:56

## 2022-01-01 RX ADMIN — CLONIDINE 1 PATCH: 0.2 PATCH TRANSDERMAL at 20:55

## 2022-01-01 RX ADMIN — ONDANSETRON 4 MG: 2 INJECTION INTRAMUSCULAR; INTRAVENOUS at 09:54

## 2022-01-01 RX ADMIN — DIPHENHYDRAMINE HYDROCHLORIDE 50 MG: 50 INJECTION INTRAMUSCULAR; INTRAVENOUS at 14:41

## 2022-01-01 RX ADMIN — HYDROMORPHONE HYDROCHLORIDE 0.5 MG: 1 INJECTION, SOLUTION INTRAMUSCULAR; INTRAVENOUS; SUBCUTANEOUS at 08:00

## 2022-01-01 RX ADMIN — DIPHENHYDRAMINE HYDROCHLORIDE 25 MG: 50 INJECTION, SOLUTION INTRAMUSCULAR; INTRAVENOUS at 19:20

## 2022-01-01 RX ADMIN — HYDROMORPHONE HYDROCHLORIDE 0.5 MG: 1 INJECTION, SOLUTION INTRAMUSCULAR; INTRAVENOUS; SUBCUTANEOUS at 04:54

## 2022-01-01 RX ADMIN — ATENOLOL 25 MG: 25 TABLET ORAL at 09:17

## 2022-01-01 RX ADMIN — HYDROMORPHONE HYDROCHLORIDE 0.5 MG: 1 INJECTION, SOLUTION INTRAMUSCULAR; INTRAVENOUS; SUBCUTANEOUS at 05:16

## 2022-01-01 RX ADMIN — HYDROMORPHONE HYDROCHLORIDE 0.5 MG: 1 INJECTION, SOLUTION INTRAMUSCULAR; INTRAVENOUS; SUBCUTANEOUS at 12:51

## 2022-01-01 RX ADMIN — ONDANSETRON 4 MG: 2 INJECTION INTRAMUSCULAR; INTRAVENOUS at 10:55

## 2022-01-01 RX ADMIN — AMLODIPINE BESYLATE 5 MG: 5 TABLET ORAL at 09:17

## 2022-01-01 RX ADMIN — PROPOFOL 120 MCG/KG/MIN: 10 INJECTION, EMULSION INTRAVENOUS at 09:47

## 2022-01-01 RX ADMIN — MIDAZOLAM HYDROCHLORIDE 1 MG: 1 INJECTION, SOLUTION INTRAMUSCULAR; INTRAVENOUS at 07:18

## 2022-01-01 RX ADMIN — PREDNISONE 5 MG: 5 TABLET ORAL at 14:07

## 2022-01-01 RX ADMIN — HYDROMORPHONE HYDROCHLORIDE 1 MG: 1 INJECTION, SOLUTION INTRAMUSCULAR; INTRAVENOUS; SUBCUTANEOUS at 12:58

## 2022-01-01 RX ADMIN — PANTOPRAZOLE SODIUM 8 MG/HR: 40 INJECTION, POWDER, FOR SOLUTION INTRAVENOUS at 14:08

## 2022-01-01 RX ADMIN — LABETALOL HYDROCHLORIDE 10 MG: 5 INJECTION, SOLUTION INTRAVENOUS at 10:42

## 2022-01-01 RX ADMIN — LORAZEPAM 0.5 MG: 0.5 TABLET ORAL at 14:07

## 2022-01-01 RX ADMIN — OMEPRAZOLE 20 MG: 20 CAPSULE, DELAYED RELEASE ORAL at 21:37

## 2022-01-01 ASSESSMENT — ENCOUNTER SYMPTOMS
SHORTNESS OF BREATH: 0
WEAKNESS: 1
PALPITATIONS: 0
NERVOUS/ANXIOUS: 0
DOUBLE VISION: 0
BLURRED VISION: 0
BACK PAIN: 0
WHEEZING: 0
COUGH: 0
HEADACHES: 0
HEADACHES: 0
VOMITING: 1
HEARTBURN: 0
ORTHOPNEA: 0
FEVER: 0
FALLS: 0
EYES NEGATIVE: 1
WEIGHT LOSS: 1
WEAKNESS: 1
DIARRHEA: 0
PALPITATIONS: 0
SHORTNESS OF BREATH: 0
VOMITING: 0
FEVER: 0
FALLS: 0
BLURRED VISION: 0
NAUSEA: 1
DOUBLE VISION: 0
ABDOMINAL PAIN: 1
CHILLS: 0
CHILLS: 0
DIZZINESS: 0
COUGH: 0
SINUS PAIN: 0
HEARTBURN: 0
DIZZINESS: 0
ABDOMINAL PAIN: 0
BACK PAIN: 0
NERVOUS/ANXIOUS: 0
HEMOPTYSIS: 0

## 2022-01-01 ASSESSMENT — PAIN DESCRIPTION - PAIN TYPE
TYPE: ACUTE PAIN
TYPE: ACUTE PAIN
TYPE: CHRONIC PAIN
TYPE: ACUTE PAIN
TYPE: CHRONIC PAIN
TYPE: CHRONIC PAIN
TYPE: ACUTE PAIN
TYPE: CHRONIC PAIN
TYPE: ACUTE PAIN
TYPE: CHRONIC PAIN
TYPE: ACUTE PAIN;CHRONIC PAIN

## 2022-01-01 ASSESSMENT — COGNITIVE AND FUNCTIONAL STATUS - GENERAL
DRESSING REGULAR LOWER BODY CLOTHING: A LITTLE
EATING MEALS: A LITTLE
SUGGESTED CMS G CODE MODIFIER MOBILITY: CH
DRESSING REGULAR LOWER BODY CLOTHING: A LITTLE
MOVING TO AND FROM BED TO CHAIR: A LITTLE
WALKING IN HOSPITAL ROOM: A LITTLE
HELP NEEDED FOR BATHING: A LITTLE
MOVING FROM LYING ON BACK TO SITTING ON SIDE OF FLAT BED: A LITTLE
DRESSING REGULAR UPPER BODY CLOTHING: A LITTLE
MOVING FROM LYING ON BACK TO SITTING ON SIDE OF FLAT BED: A LITTLE
TOILETING: A LITTLE
MOVING TO AND FROM BED TO CHAIR: A LITTLE
SUGGESTED CMS G CODE MODIFIER MOBILITY: CK
PERSONAL GROOMING: A LITTLE
SUGGESTED CMS G CODE MODIFIER DAILY ACTIVITY: CK
STANDING UP FROM CHAIR USING ARMS: A LITTLE
TURNING FROM BACK TO SIDE WHILE IN FLAT BAD: A LITTLE
DRESSING REGULAR UPPER BODY CLOTHING: A LITTLE
DAILY ACTIVITIY SCORE: 19
DAILY ACTIVITIY SCORE: 24
CLIMB 3 TO 5 STEPS WITH RAILING: A LITTLE
STANDING UP FROM CHAIR USING ARMS: A LITTLE
MOBILITY SCORE: 18
SUGGESTED CMS G CODE MODIFIER DAILY ACTIVITY: CK
EATING MEALS: A LITTLE
MOBILITY SCORE: 19
TOILETING: A LITTLE
DAILY ACTIVITIY SCORE: 18
SUGGESTED CMS G CODE MODIFIER MOBILITY: CK
CLIMB 3 TO 5 STEPS WITH RAILING: A LITTLE
MOBILITY SCORE: 24
HELP NEEDED FOR BATHING: A LITTLE
WALKING IN HOSPITAL ROOM: A LITTLE
SUGGESTED CMS G CODE MODIFIER DAILY ACTIVITY: CH

## 2022-01-01 ASSESSMENT — PATIENT HEALTH QUESTIONNAIRE - PHQ9
1. LITTLE INTEREST OR PLEASURE IN DOING THINGS: NOT AT ALL
SUM OF ALL RESPONSES TO PHQ9 QUESTIONS 1 AND 2: 0
2. FEELING DOWN, DEPRESSED, IRRITABLE, OR HOPELESS: NOT AT ALL
1. LITTLE INTEREST OR PLEASURE IN DOING THINGS: NOT AT ALL
2. FEELING DOWN, DEPRESSED, IRRITABLE, OR HOPELESS: NOT AT ALL
SUM OF ALL RESPONSES TO PHQ9 QUESTIONS 1 AND 2: 0

## 2022-01-01 ASSESSMENT — LIFESTYLE VARIABLES
HAVE YOU EVER FELT YOU SHOULD CUT DOWN ON YOUR DRINKING: NO
HAVE PEOPLE ANNOYED YOU BY CRITICIZING YOUR DRINKING: NO
TOTAL SCORE: 0
HAVE YOU EVER FELT YOU SHOULD CUT DOWN ON YOUR DRINKING: NO
ALCOHOL_USE: NO
HOW MANY TIMES IN THE PAST YEAR HAVE YOU HAD 5 OR MORE DRINKS IN A DAY: 0
TOTAL SCORE: 0
EVER FELT BAD OR GUILTY ABOUT YOUR DRINKING: NO
ON A TYPICAL DAY WHEN YOU DRINK ALCOHOL HOW MANY DRINKS DO YOU HAVE: 0
EVER HAD A DRINK FIRST THING IN THE MORNING TO STEADY YOUR NERVES TO GET RID OF A HANGOVER: NO
SUBSTANCE_ABUSE: 0
EVER FELT BAD OR GUILTY ABOUT YOUR DRINKING: NO
CONSUMPTION TOTAL: NEGATIVE
HOW MANY TIMES IN THE PAST YEAR HAVE YOU HAD 5 OR MORE DRINKS IN A DAY: 0
EVER HAD A DRINK FIRST THING IN THE MORNING TO STEADY YOUR NERVES TO GET RID OF A HANGOVER: NO
AVERAGE NUMBER OF DAYS PER WEEK YOU HAVE A DRINK CONTAINING ALCOHOL: 0
TOTAL SCORE: 0
HAVE PEOPLE ANNOYED YOU BY CRITICIZING YOUR DRINKING: NO
ON A TYPICAL DAY WHEN YOU DRINK ALCOHOL HOW MANY DRINKS DO YOU HAVE: 0
CONSUMPTION TOTAL: NEGATIVE
ALCOHOL_USE: NO
TOTAL SCORE: 0
SUBSTANCE_ABUSE: 0
AVERAGE NUMBER OF DAYS PER WEEK YOU HAVE A DRINK CONTAINING ALCOHOL: 0
TOTAL SCORE: 0
TOTAL SCORE: 0

## 2022-01-01 ASSESSMENT — FIBROSIS 4 INDEX
FIB4 SCORE: 0.86
FIB4 SCORE: 0.44
FIB4 SCORE: 0.87
FIB4 SCORE: 1.01
FIB4 SCORE: 0.73
FIB4 SCORE: 0.76
FIB4 SCORE: 0.76
FIB4 SCORE: 0.85
FIB4 SCORE: 1.32
FIB4 SCORE: 0.71
FIB4 SCORE: 0.56
FIB4 SCORE: 0.85
FIB4 SCORE: 0.76
FIB4 SCORE: 0.76

## 2022-01-01 ASSESSMENT — PAIN SCALES - GENERAL: PAIN_LEVEL: 0

## 2022-01-01 NOTE — CARE PLAN
The patient is Watcher - Medium risk of patient condition declining or worsening    Shift Goals  Clinical Goals: Will transfuse blood   Patient Goals: Free of injury     Progress made toward(s) clinical / shift goals:      Problem: Pain - Standard  Goal: Alleviation of pain or a reduction in pain to the patient’s comfort goal  Outcome: Progressing  Note: Pt denied any pain upon assessment   Only required to pre-medicate Tylenol for blood transfusion      Problem: Knowledge Deficit - Standard  Goal: Patient and family/care givers will demonstrate understanding of plan of care, disease process/condition, diagnostic tests and medications  Outcome: Progressing  Note: Pt is receiving a unit of blood.   Will continue to trend lab

## 2022-01-01 NOTE — PROGRESS NOTES
Called blood bank to check if blood has arrived from the [facility]. They reported that they received a call from [facility] around 0030 and the blood should be ready shortly.     Rounding on pt, pt sleeping soundly in bed with continuous pulse oximeter on. Will continue to monitor

## 2022-01-01 NOTE — ASSESSMENT & PLAN NOTE
Does not have active bleeding at this point  The patient reports that she did have a brief episode of epistaxis on the morning of admission.  Likely multifactorial secondary to inflammatory anemia, chronic disease, poor production from end-stage renal disease  Transfusing 1 unit RBC for hemoglobin of 6.6.  Continue to monitor, transfuse for hemoglobin of less than or equal to 7.   We will check iron saturation and ferritin, consider parenteral iron replacement according to results

## 2022-01-01 NOTE — ED TRIAGE NOTES
25 yo female bib [facility] from home s/p headache after dialysis.  Patient reports she had the same amount of fluid taken off today as normal.  Did try a Percocet at home with no relief.  Patient received Zofran 4 mg and Morphine 2 mg via [facility]    Patient is Covid vaccinated

## 2022-01-01 NOTE — ED PROVIDER NOTES
ED Provider  Scribed for Marciano Camacho D.O. by Jean Jenkins. 12/31/2021  4:44 PM    Means of arrival:EMS  History obtained from:Patient  History limited by: None    CHIEF COMPLAINT  Chief Complaint   Patient presents with    Headache       HPI  Lily Nicole is a 26 y.o. female who presents via EMS for worsening headache. Patient reports she has been attending dialysis for 9 years. She notes she has history of similar symptoms with the last one occurring 5 months ago during dialysis and after dialysis. Patient states she began experiencing a mild headache during dialysis and worsened shortly after dialysis. She describes the pain as throbbing. She adds the pain is concentrated on the sides of her head and radiates to the back of her head. Patient endorses associated nosebleeds, but denies any nausea, leg pain, leg numbness, leg tingling, fevers, chills, cough, congestion, or shortness of breath. No alleviating or exacerbating factors noted. She notes she is not following up with Neurologist for her headaches. She states she uses supplemental O2 occasionally at home.    She notes her hemoglobin and platelets were low a week ago and experienced dizziness    REVIEW OF SYSTEMS  See HPI for further details. All other systems are negative.     PAST MEDICAL HISTORY   has a past medical history of Anemia (01/17/2018), AVF (arteriovenous fistula) ([facility]), Chest pain, Chest tightness, Daytime sleepiness, Dialysis patient ([facility]), Difficulty breathing, ESRD (end stage renal disease) on dialysis ([facility]) (01/17/2018), Gasping for breath, Heart burn, Hypertension (01/17/2018), Indigestion, Lupus ([facility]), Migraines (01/17/2018), Painful breathing, Palpitations, Seizure ([facility]) (2013), Shortness of breath, Swelling of lower extremity, and Wheezing.    SOCIAL HISTORY  Social History     Tobacco Use    Smoking status: Never Smoker    Smokeless tobacco: Never Used   Vaping Use    Vaping Use: Never used   Substance and Sexual Activity     Alcohol use: No    Drug use: No    Sexual activity: None noted       SURGICAL HISTORY   has a past surgical history that includes ronak by laparoscopy (4/5/2010); av fistula creation (Right); angioplasty (01/17/2018); other; other; gastroscopy-endo (12/9/2019); gastroscopy (N/A, 5/30/2020); gastroscopy-endo (9/18/2020); upper gi endoscopy,ctrl bleed (11/12/2020); upper gi endoscopy,biopsy (11/12/2020); gastroscopy-endo (11/12/2020); colonoscopy,diagnostic (1/8/2021); upper gi endoscopy,diagnosis (1/8/2021); upper gi endoscopy,ctrl bleed (1/8/2021); upper gi endoscopy,diagnosis (3/5/2021); upper gi endoscopy,diagnosis (N/A, 3/19/2021); upper gi endoscopy,ctrl bleed (3/19/2021); and bronchoscopy,diagnostic (Bilateral, 5/13/2021).    CURRENT MEDICATIONS  Home Medications       Reviewed by Shefali Rao (Pharmacy Tech) on 12/31/21 at 1652  Med List Status: Complete     Medication Last Dose Status   acetaminophen (TYLENOL) 500 MG Tab FEW DAYS AGO Active   amLODIPine (NORVASC) 5 MG Tab 12/31/2021 Active   atenolol (TENORMIN) 25 MG Tab 12/31/2021 Active   cloNIDine (CATAPRES) 0.2 MG/24HR PATCH WEEKLY patch 12/28/2021 Active   hydroxychloroquine (PLAQUENIL) 200 MG Tab 12/31/2021 Active   mycophenolate sodium (MYFORTIC) 360 MG Tablet Delayed Response tablet 12/30/2021 Active   pantoprazole (PROTONIX) 40 MG Tablet Delayed Response 12/31/2021 Active   predniSONE (DELTASONE) 5 MG Tab 12/30/2021 Active                    ALLERGIES  Allergies   Allergen Reactions    Cephalexin Rash     Nausea and rash     Clindamycin Rash     Nausea and rash       Methylprednisolone      Anxious    Metoprolol Rash     Nausea and rash       Fentanyl And Related Anxiety    Maxipime [Cefepime] Itching    Norco [Hydrocodone-Acetaminophen] Rash and Nausea     Generalized rash    Reglan [Metoclopramide] Anxiety    Tape Rash     Paper tape is ok       PHYSICAL EXAM  VITAL SIGNS: BP (!) 175/102   Pulse (!) 112   Temp 36.8 °C (98.2 °F) (Temporal)   " Resp 19   Ht 1.702 m (5' 7\")   Wt 52.2 kg (115 lb)   SpO2 100%   BMI 18.01 kg/m²   Constitutional: Alert in no apparent distress.  HENT: No signs of trauma, mucous membranes are moist  Eyes: Conjunctiva normal, Non-icteric.   Neck: Normal range of motion, No tenderness, Supple.  Lymphatic: No lymphadenopathy noted.   Cardiovascular: Regular rate and rhythm, no murmurs.   Thorax & Lungs: Normal breath sounds, No respiratory distress, No wheezing, No chest tenderness.   Abdomen: Bowel sounds normal, Soft, No tenderness, No masses, No pulsatile masses. No peritoneal signs.  Skin: Warm, Dry, normal color.   Back: No bony tenderness, No CVA tenderness.   Extremities: Right upper extremity has a fistula.  Musculoskeletal: Good range of motion in all major joints. No tenderness to palpation or major deformities noted.   Neurologic: Alert and oriented x4, Normal motor function, Normal sensory function, No focal deficits noted.   Psychiatric: Affect normal, Judgment normal, Mood normal.     DIAGNOSTIC STUDIES / PROCEDURES    LABS  Results for orders placed or performed during the hospital encounter of 12/31/21   CBC WITH DIFFERENTIAL   Result Value Ref Range    WBC 3.9 (L) 4.8 - 10.8 K/uL    RBC 2.43 (L) 4.20 - 5.40 M/uL    Hemoglobin 6.6 (L) 12.0 - 16.0 g/dL    Hematocrit 22.7 (L) 37.0 - 47.0 %    MCV 93.4 81.4 - 97.8 fL    MCH 27.2 27.0 - 33.0 pg    MCHC 29.1 (L) 33.6 - 35.0 g/dL    RDW 61.7 (H) 35.9 - 50.0 fL    Platelet Count 91 (L) 164 - 446 K/uL    MPV 11.3 9.0 - 12.9 fL    Neutrophils-Polys 82.90 (H) 44.00 - 72.00 %    Lymphocytes 8.80 (L) 22.00 - 41.00 %    Monocytes 6.50 0.00 - 13.40 %    Eosinophils 0.30 0.00 - 6.90 %    Basophils 0.50 0.00 - 1.80 %    Immature Granulocytes 1.00 (H) 0.00 - 0.90 %    Nucleated RBC 0.00 /100 WBC    Neutrophils (Absolute) 3.19 2.00 - 7.15 K/uL    Lymphs (Absolute) 0.34 (L) 1.00 - 4.80 K/uL    Monos (Absolute) 0.25 0.00 - 0.85 K/uL    Eos (Absolute) 0.01 0.00 - 0.51 K/uL    Baso " (Absolute) 0.02 0.00 - 0.12 K/uL    Immature Granulocytes (abs) 0.04 0.00 - 0.11 K/uL    NRBC (Absolute) 0.00 K/uL   BASIC METABOLIC PANEL   Result Value Ref Range    Sodium 136 135 - 145 mmol/L    Potassium 4.6 3.6 - 5.5 mmol/L    Chloride 95 (L) 96 - 112 mmol/L    Co2 32 20 - 33 mmol/L    Glucose 96 65 - 99 mg/dL    Bun 19 8 - 22 mg/dL    Creatinine 3.32 (H) 0.50 - 1.40 mg/dL    Calcium 8.7 8.4 - 10.2 mg/dL    Anion Gap 9.0 7.0 - 16.0   ESTIMATED GFR   Result Value Ref Range    GFR If  21 (A) >60 mL/min/1.73 m 2    GFR If Non African American 17 (A) >60 mL/min/1.73 m 2   COD - Adult (Type and Screen)   Result Value Ref Range    ABO Grouping Only O     Rh Grouping Only POS     Antibody Screen-Cod NEG       All labs reviewed by me.    RADIOLOGY  CT-HEAD W/O   Final Result      No acute intracranial abnormality.           The radiologist's interpretations of all radiological studies have been reviewed by me.    Films have been independently by me      COURSE  Pertinent Labs & Imaging studies reviewed. (See chart for details)    4:44 PM - Patient seen and examined at bedside. Discussed plan of care. The patient will be medicated with Imitrex, Compazine, and Benadryl. Ordered for CT Head, CBC w/ diff, and BMP to evaluate her symptoms.     6:08 PM - . I spoke to Dr. Ríos (Hospitalist) and updated him on the patient's findings. Patient will be admitted for anemia. Patient was reevaluated at bedside. Discussed lab and radiology results with the patient I updated the patient on the plan for admission. Patient's headache is unchanged, she will be treated with Dilaudid for her headache.      MEDICAL DECISION MAKING  This is a 26 y.o. female who presents with complaints of a severe headache, it was mild in onset but now is severe.  She is medicated with a migraine type medications that is because of anxiety.  She is medicated with narcotic this improved her headache.    With her dialysis she does get some  anticoagulation and other concerns for cerebral hemorrhage so CT was done which was negative.  Her lab test shows a hemoglobin of 6.6.  5 days ago hemoglobin was 8 this is a significant change.  She has no active bleeding.  Patient will be admitted for further evaluation and treatment of anemia.      DISPOSITION:  Patient will be hospitalized by Dr. Ríos in guarded condition.     FINAL IMPRESSION  1. Chronic renal failure, stage 5 (HCC)    2. Acute anemia         Jean PARRA (Scribe), am scribing for, and in the presence of, Marciano Camacho D.O..    Electronically signed by: Jean Jenkins (Scribe), 12/31/2021    IMarciano D.O. personally performed the services described in this documentation, as scribed by Jean Jenkins in my presence, and it is both accurate and complete.    The note accurately reflects work and decisions made by me.  Marciano Camacho D.O.  12/31/2021  7:55 PM

## 2022-01-01 NOTE — DISCHARGE SUMMARY
Discharge Summary    CHIEF COMPLAINT ON ADMISSION  Chief Complaint   Patient presents with   • Headache       Reason for Admission  EMS     Admission Date  12/31/2021    CODE STATUS  Full Code    HPI & HOSPITAL COURSE  This is a 26 y.o. female with a history of end-stage renal disease secondary to SLE on dialysis Monday Wednesday Friday, hypertension and a remote history of gastric ulcers/GI bleeding, presented from dialysis with generalized weakness and headache found to have severe anemia with hemoglobin of 6.6.  She denies any blood loss in the stool and denies any abdominal pain.  She did report symptoms of a bloody nose.  She was given 1 unit RBC transfusion and her hemoglobin improved to 7.3.  She was counseled extensively on the importance of avoiding all NSAIDs and she confirms that she does not use these.  She does take daily Protonix however is on prednisone therapy for SLE treatment which can increase gastric ulcer risk.  Although she had no observed blood loss and her anemia was presumed secondary to ESRD, she was advised to make an appointment with her GI physician for routine follow-up and to discuss ongoing anemia requiring transfusion to ensure she does not require endoscopy to evaluate for alternative cause of anemia.    I discussed the case with her nephrologist, Dr. Najjar as her repeat electrolytes showed hyperkalemia with potassium of 5.6.  He recommended a short dialysis treatment which was completed on 1/1/2022.    Therefore, she is discharged in fair and stable condition to home with close outpatient follow-up.    The patient recovered much more quickly than anticipated on admission.    Discharge Date  1/1/21    FOLLOW UP ITEMS POST DISCHARGE  She will follow-up with outpatient nephrology as well as outpatient GI to discuss recurrent anemia, possible endoscopy    DISCHARGE DIAGNOSES  Principal Problem:    Anemia requiring transfusions POA: Yes  Active Problems:    ESRD (end stage renal  "disease) on dialysis (HCC) POA: Yes    HTN (hypertension) POA: Yes      Overview: \"Controlled with medication\"    Symptomatic anemia requiring blood transfusion POA: Yes    SLE glomerulonephritis syndrome (HCC) POA: Yes      Overview: Formatting of this note might be different from the original.      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  Co- Management with       Pediatric Nephrology, Pediatric Rheumatology.  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 *      FOLLOW UP  Future Appointments   Date Time Provider Department Center   1/9/2022  7:30 AM [facility]   1/11/2022  [facility]   1/23/2022  [facility]   1/25/2022  7[facility]     No follow-up provider specified.    MEDICATIONS ON DISCHARGE     Medication List      START taking these medications      Instructions   epoetin 3000 UNIT/ML Soln  Commonly known as: Retacrit   Inject 1 mL under the skin one time for 1 dose.  Dose: 3,000 Units        CONTINUE taking these medications      Instructions   acetaminophen 500 MG Tabs  Commonly known as: TYLENOL   Take 1,000 mg by " mouth every 6 hours as needed for Moderate Pain.  Dose: 1,000 mg     amLODIPine 5 MG Tabs  Commonly known as: NORVASC   Take 5 mg by mouth every day.  Dose: 5 mg     atenolol 25 MG Tabs  Commonly known as: TENORMIN   Take 25 mg by mouth every day.  Dose: 25 mg     cloNIDine 0.2 MG/24HR Ptwk patch  Commonly known as: CATAPRES   Place 1 Patch on the skin every Tuesday.  Dose: 1 Patch     hydroxychloroquine 200 MG Tabs  Commonly known as: Plaquenil   Take 200 mg by mouth every day.  Dose: 200 mg     mycophenolate sodium 360 MG Tbec tablet  Commonly known as: MYFORTIC   Take 360 mg by mouth every evening.  Dose: 360 mg     pantoprazole 40 MG Tbec  Commonly known as: PROTONIX   Take 1 tablet by mouth 2 times a day.  Dose: 40 mg     predniSONE 5 MG Tabs  Commonly known as: DELTASONE   Take 5 mg by mouth every evening.  Dose: 5 mg            Allergies  Allergies   Allergen Reactions   • Cephalexin Rash     Nausea and rash    • Clindamycin Rash     Nausea and rash      • Methylprednisolone      Anxious   • Metoprolol Rash     Nausea and rash      • Fentanyl And Related Anxiety   • Maxipime [Cefepime] Itching   • Norco [Hydrocodone-Acetaminophen] Rash and Nausea     Generalized rash   • Reglan [Metoclopramide] Anxiety   • Tape Rash     Paper tape is ok       DIET  Orders Placed This Encounter   Procedures   • Diet Order Diet: Renal     Standing Status:   Standing     Number of Occurrences:   1     Order Specific Question:   Diet:     Answer:   Renal [8]       ACTIVITY  As tolerated.  Weight bearing as tolerated    CONSULTATIONS  Dr. Najjar - Nephrology    PROCEDURES  HD 1/1/22    LABORATORY  Lab Results   Component Value Date    SODIUM 133 (L) 01/01/2022    POTASSIUM 5.6 (H) 01/01/2022    CHLORIDE 94 (L) 01/01/2022    CO2 30 01/01/2022    GLUCOSE 90 01/01/2022    BUN 33 (H) 01/01/2022    CREATININE 4.88 (H) 01/01/2022        Lab Results   Component Value Date    WBC 4.1 (L) 01/01/2022    HEMOGLOBIN 7.3 (L) 01/01/2022     HEMATOCRIT 24.4 (L) 01/01/2022    PLATELETCT 98 (L) 01/01/2022        Total time of the discharge process exceeds 43 minutes.

## 2022-01-01 NOTE — PROGRESS NOTES
Assumed care of patient. Bedside report received from night shift RN. Patient updated on plan of care. Call light is within reach and fall precautions are in place. All needs met at this time. Hourly rounding in place.

## 2022-01-01 NOTE — PROGRESS NOTES
[facility] Progress Note     Hemodialysis treatment x 2 hours completed as ordered per Dr. Najjar  Treatment started at 1453 and ended at 1654.       Net UF Removed:  2,500 mL (see Dialysis I & O Flowsheet)    Patient tolerated treatment well. Patient stable during and post treatment, no complaints. 1 unit PRBCs (350 mL) transfused during treament 5906-4200, no reactions noted. See Acute HD paper flow sheets for details.     Post treatment: Cannulation needles removed from RUE AVF access sites, hemostasis established on each insertion site; verified no bleeding. (+) bruit/thrill post treatment. Covered with clean gauze dressings and secured with tape.    Please monitor for AVF access bleeding. Should any breakthrough bleeding occur, please apply gauze and firm pressure on site until bleeding resolves, cover with gauze dressing and tape. Please observe RUE precautions- No taking of BPs or blood draws to access side please.    Please notify Dialysis/Nephrologist for follow-up.    Report given to primary care nurse SATISH Wilkerson RN.

## 2022-01-01 NOTE — PROGRESS NOTES
0410: Blood transfusion initiated, VSS. Pre-medicated with Tylenol and Benadryl     0440: Spent the first 15 minutes with patient. No reactions seen. Will continue to monitor

## 2022-01-01 NOTE — ED NOTES
Assumed patient care. Pt assesement done.  Plan of care reviewed with patient.     Medicinal interventions carried out per ERP orders.     Blood bank called - [facility].

## 2022-01-01 NOTE — PROGRESS NOTES
Pt arrived via gurney, admitted to room 213-1 from ER  Pt is A&Ox4, denied any pain   Informed pt about [facility] and will start the transfusion as soon as the blood arrives - pt verbalized understanding   Oriented to room call light and smoking policy.   Reviewed plan of care with the patient and the family.   Fall precaution in place.   Call light within reach.  Encouraged pt the importance to call for assistance. Continue to monitor.   Hourly rounding in progress

## 2022-01-01 NOTE — DIETARY
"Nutrition services: Day 1 of admit.  Lily Nicole is a 26 y.o. female with admitting DX of Anemia requiring transfusions.  Consult received for BMI <19.    Assessment:  Height: 170.2 cm (5' 7\")  Weight: 52.2 kg (115 lb)  Body mass index is 18.01 kg/m²., BMI classification: Underweight  Diet/Intake: Renal    Evaluation:   1. Pt seen for BMI <19 (18.01). Pt states she had previously lost weight, but has been stable currently. Per chart review, weight on 11/12/21 = 54.1 kg. Pt with 3.7% loss x 2 months which does not meet criteria for malnutrition.  2. Pt appears thin, but no significant fat or muscle wasting observed.  3. Pt reports a good appetite and states she is eating well. Recorded PO intake at breakfast = %.    Malnutrition Risk: Criteria not met.    Recommendations/Plan:   1. Encourage continued alf intake of meals >50%.  2. Document intake of all meals as % taken in ADL's to provide interdisciplinary communication across all shifts.   3. Monitor weight.  4. Nutrition rep will continue to see patient for ongoing meal and snack preferences.             "

## 2022-01-01 NOTE — H&P
Hospital Medicine History & Physical Note    Date of Service  12/31/2021    Primary Care Physician  Ella Matthew M.D.    Consultants  None     Code Status  Full Code    Chief Complaint  Chief Complaint   Patient presents with   • Headache     History of Presenting Illness  Lily Nicole is a 26 y.o. female with a past medical history of end-stage renal disease on hemodialysis, hypertension who presented 12/31/2021 with generalized weakness headache and dizziness after dialysis.  Patient reports severe headache, throbbing in character.  Pain started with dialysis and progressed worse throughout after dialysis.  No nausea or vomiting, but reports having leg cramps.  Denies having fevers chills shortness of breath lower extremity pain redness or swelling.  The patient reports that she did have a brief episode of epistaxis on the morning of admission.    I discussed the plan of care with emergency department physician, the patient and patient's family was present at bedside.    Review of Systems  Review of Systems   Constitutional: Positive for malaise/fatigue. Negative for chills and fever.   Eyes: Negative for discharge and redness.   Respiratory: Negative for cough, shortness of breath and stridor.    Cardiovascular: Negative for chest pain and leg swelling.   Gastrointestinal: Negative for abdominal pain and vomiting.   Genitourinary: Negative for flank pain.   Musculoskeletal: Negative for myalgias.   Skin: Negative.    Neurological: Positive for dizziness and headaches. Negative for focal weakness.   Endo/Heme/Allergies: Does not bruise/bleed easily.   Psychiatric/Behavioral: The patient is not nervous/anxious.      Past Medical History   has a past medical history of Anemia (01/17/2018), AVF (arteriovenous fistula) ([facility]), Chest pain, Chest tightness, Daytime sleepiness, Dialysis patient ([facility]), Difficulty breathing, ESRD (end stage renal disease) on dialysis ([facility]) (01/17/2018), Gasping for breath, Heart  burn, Hypertension (01/17/2018), Indigestion, Lupus (HCC), Migraines (01/17/2018), Painful breathing, Palpitations, Seizure (HCC) (2013), Shortness of breath, Swelling of lower extremity, and Wheezing.    Surgical History   has a past surgical history that includes ronak by laparoscopy (4/5/2010); av fistula creation (Right); angioplasty (01/17/2018); other; other; gastroscopy-endo (12/9/2019); gastroscopy (N/A, 5/30/2020); gastroscopy-endo (9/18/2020); pr upper gi endoscopy,ctrl bleed (11/12/2020); pr upper gi endoscopy,biopsy (11/12/2020); gastroscopy-endo (11/12/2020); pr colonoscopy,diagnostic (1/8/2021); pr upper gi endoscopy,diagnosis (1/8/2021); pr upper gi endoscopy,ctrl bleed (1/8/2021); pr upper gi endoscopy,diagnosis (3/5/2021); pr upper gi endoscopy,diagnosis (N/A, 3/19/2021); pr upper gi endoscopy,ctrl bleed (3/19/2021); and pr bronchoscopy,diagnostic (Bilateral, 5/13/2021).     Family History  family history includes Diabetes in her paternal grandmother.      Social History   reports that she has never smoked. She has never used smokeless tobacco. She reports that she does not drink alcohol and does not use drugs.    Allergies  Allergies   Allergen Reactions   • Cephalexin Rash     Nausea and rash    • Clindamycin Rash     Nausea and rash      • Methylprednisolone      Anxious   • Metoprolol Rash     Nausea and rash      • Fentanyl And Related Anxiety   • Maxipime [Cefepime] Itching   • Norco [Hydrocodone-Acetaminophen] Rash and Nausea     Generalized rash   • Reglan [Metoclopramide] Anxiety   • Tape Rash     Paper tape is ok     Medications  Prior to Admission Medications   Prescriptions Last Dose Informant Patient Reported? Taking?   acetaminophen (TYLENOL) 500 MG Tab FEW DAYS AGO at UNK Patient Yes No   Sig: Take 1,000 mg by mouth every 6 hours as needed for Moderate Pain.   amLODIPine (NORVASC) 5 MG Tab 12/31/2021 at AM Patient Yes No   Sig: Take 5 mg by mouth every day.   atenolol (TENORMIN) 25 MG  Tab 12/31/2021 at AM Patient Yes No   Sig: Take 25 mg by mouth every day.   cloNIDine (CATAPRES) 0.2 MG/24HR PATCH WEEKLY patch 12/28/2021 at PATCH ON Patient Yes No   Sig: Place 1 Patch on the skin every Tuesday.   hydroxychloroquine (PLAQUENIL) 200 MG Tab 12/31/2021 at AM Patient Yes No   Sig: Take 200 mg by mouth every day.   mycophenolate sodium (MYFORTIC) 360 MG Tablet Delayed Response tablet 12/30/2021 at PM Patient Yes No   Sig: Take 360 mg by mouth every evening.   pantoprazole (PROTONIX) 40 MG Tablet Delayed Response 12/31/2021 at AM Patient No No   Sig: Take 1 tablet by mouth 2 times a day.   predniSONE (DELTASONE) 5 MG Tab 12/30/2021 at PM Patient Yes No   Sig: Take 5 mg by mouth every evening.      Facility-Administered Medications: None     Physical Exam  Temp:  [36.8 °C (98.2 °F)] 36.8 °C (98.2 °F)  Pulse:  [] 97  Resp:  [16-19] 16  BP: (134-175)/() 141/86  SpO2:  [91 %-100 %] 100 %  Blood Pressure: (!) 167/96   Temperature: 36.8 °C (98.2 °F)   Pulse: (!) 110   Respiration: 18   Pulse Oximetry: 100 %     Physical Exam  Constitutional:       General: She is not in acute distress.     Comments: Chronically ill-appearing   HENT:      Head: Normocephalic and atraumatic.      Right Ear: External ear normal.      Left Ear: External ear normal.      Nose: No congestion or rhinorrhea.      Mouth/Throat:      Mouth: Mucous membranes are moist.      Pharynx: No oropharyngeal exudate or posterior oropharyngeal erythema.   Eyes:      General: No scleral icterus.        Right eye: No discharge.         Left eye: No discharge.      Conjunctiva/sclera: Conjunctivae normal.      Pupils: Pupils are equal, round, and reactive to light.   Cardiovascular:      Rate and Rhythm: Tachycardia present.      Heart sounds: No friction rub. No gallop.    Pulmonary:      Effort: Pulmonary effort is normal.   Abdominal:      General: Abdomen is flat. There is no distension.      Tenderness: There is no guarding.    Musculoskeletal:         General: No swelling.      Cervical back: Neck supple. No rigidity. No muscular tenderness.      Right lower leg: No edema.      Left lower leg: No edema.      Comments: Fistula right arm   Skin:     Capillary Refill: Capillary refill takes 2 to 3 seconds.      Coloration: Skin is not jaundiced or pale.      Findings: No bruising or erythema.   Neurological:      Mental Status: She is alert and oriented to person, place, and time.   Psychiatric:         Mood and Affect: Mood normal.         Judgment: Judgment normal.       Laboratory:  Recent Labs     12/31/21  1701   WBC 3.9*   RBC 2.43*   HEMOGLOBIN 6.6*   HEMATOCRIT 22.7*   MCV 93.4   MCH 27.2   MCHC 29.1*   RDW 61.7*   PLATELETCT 91*   MPV 11.3     Recent Labs     12/31/21  1701   SODIUM 136   POTASSIUM 4.6   CHLORIDE 95*   CO2 32   GLUCOSE 96   BUN 19   CREATININE 3.32*   CALCIUM 8.7     Recent Labs     12/31/21  1701   GLUCOSE 96         No results for input(s): NTPROBNP in the last 72 hours.      No results for input(s): TROPONINT in the last 72 hours.    Imaging:  CT-HEAD W/O   Final Result      No acute intracranial abnormality.           Assessment/Plan:  I anticipate this patient will require at least two midnights for appropriate medical management, necessitating inpatient admission.    * Anemia requiring transfusions- (present on admission)  Assessment & Plan  Does not have active bleeding at this point  The patient reports that she did have a brief episode of epistaxis on the morning of admission.  Likely multifactorial secondary to inflammatory anemia, chronic disease, poor production from end-stage renal disease  Transfusing 1 unit RBC for hemoglobin of 6.6.  Continue to monitor, transfuse for hemoglobin of less than or equal to 7.   We will check iron saturation and ferritin, consider parenteral iron replacement according to results     Symptomatic anemia requiring blood transfusion- (present on admission)  Assessment &  Plan  Does not have active bleeding at this point  The patient reports that she did have a brief episode of epistaxis on the morning of admission.  Likely multifactorial secondary to inflammatory anemia, chronic disease, poor production from end-stage renal disease  Transfusing 1 unit RBC for hemoglobin of 6.6.  Continue to monitor, transfuse for hemoglobin of less than or equal to 7.  We will check iron saturation and ferritin, consider parenteral iron replacement according to results    SLE glomerulonephritis syndrome (HCC)- (present on admission)  Assessment & Plan  Resume home immunosuppressive medications    HTN (hypertension)- (present on admission)  Assessment & Plan  Resume home amlodipine, atenolol and clonidine with hold parameters    ESRD (end stage renal disease) on dialysis ([facility])- (present on admission)  Assessment & Plan  S/p dialysis 12/31/2021     VTE prophylaxis: SCDs/TEDs thrombocytopenia, severe anemia requiring blood transfusion

## 2022-01-01 NOTE — ED NOTES
Med Rec completed per patient   Allergies reviewed  No ORAL antibiotics in last 30 days    Patient has a Clonidine patch on that was placed on 12/28/2021

## 2022-01-01 NOTE — PROGRESS NOTES
4 Eyes Skin Assessment Completed by MICEHLLE Elliott and MICHELLE Roberto.    Head WDL  Ears WDL  Nose WDL  Mouth WDL  Neck WDL  Breast/Chest WDL  Shoulder Blades WDL  Spine WDL  (R) Arm/Elbow/Hand: x3 fistulas present   (L) Arm/Elbow/Hand WDL  Abdomen WDL  Groin WDL  Scrotum/Coccyx/Buttocks WDL  (R) Leg WDL  (L) Leg WDL  (R) Heel/Foot/Toe WDL  (L) Heel/Foot/Toe WDL      Devices In Places Nasal Cannula    Interventions In Place N/A    Possible Skin Injury No    Pictures Uploaded Into Epic N/A  Wound Consult Placed N/A  RN Wound Prevention Protocol Ordered No    Oriented - self; Oriented - place; Oriented - time

## 2022-01-02 NOTE — DISCHARGE INSTRUCTIONS
Discharge Instructions    Discharged to home by car with self. Discharged via wheelchair, hospital escort: Yes.  Special equipment needed: Not Applicable    Be sure to schedule a follow-up appointment with your primary care doctor or any specialists as instructed.     Discharge Plan:   Influenza Vaccine Indication: Not indicated: Previously immunized this influenza season and > 8 years of age    I understand that a diet low in cholesterol, fat, and sodium is recommended for good health. Unless I have been given specific instructions below for another diet, I accept this instruction as my diet prescription.   Other diet: Renal    Special Instructions: None    · Is patient discharged on Warfarin / Coumadin?   No     Depression / Suicide Risk    As you are discharged from this [facility], it is important to learn how to keep safe from harming yourself.    Recognize the warning signs:  · Abrupt changes in personality, positive or negative- including increase in energy   · Giving away possessions  · Change in eating patterns- significant weight changes-  positive or negative  · Change in sleeping patterns- unable to sleep or sleeping all the time   · Unwillingness or inability to communicate  · Depression  · Unusual sadness, discouragement and loneliness  · Talk of wanting to die  · Neglect of personal appearance   · Rebelliousness- reckless behavior  · Withdrawal from people/activities they love  · Confusion- inability to concentrate     If you or a loved one observes any of these behaviors or has concerns about self-harm, here's what you can do:  · Talk about it- your feelings and reasons for harming yourself  · Remove any means that you might use to hurt yourself (examples: pills, rope, extension cords, firearm)  · Get professional help from the community (Mental Health, Substance Abuse, psychological counseling)  · Do not be alone:Call your Safe Contact- someone whom you trust who will be there for  you.  · Call your local CRISIS HOTLINE 251-7055 or 218-435-3329  · Call your local Children's Mobile Crisis Response Team [place] (486) 063-6974 or www.[employer]  · Call the toll free National Suicide Prevention Hotlines   · National Suicide Prevention Lifeline 533-331-DLSZ (8071)  · [employer] 800-SUICIDE (439-9670)          Anemia    Anemia is a condition in which you do not have enough red blood cells or hemoglobin. Hemoglobin is a substance in red blood cells that carries oxygen. When you do not have enough red blood cells or hemoglobin (are anemic), your body cannot get enough oxygen and your organs may not work properly. As a result, you may feel very tired or have other problems.  What are the causes?  Common causes of anemia include:  · Excessive bleeding. Anemia can be caused by excessive bleeding inside or outside the body, including bleeding from the intestine or from periods in women.  · Poor nutrition.  · Long-lasting (chronic) kidney, thyroid, and liver disease.  · Bone marrow disorders.  · Cancer and treatments for cancer.  · HIV (human immunodeficiency virus) and AIDS (acquired immunodeficiency syndrome).  · Treatments for HIV and AIDS.  · Spleen problems.  · Blood disorders.  · Infections, medicines, and autoimmune disorders that destroy red blood cells.  What are the signs or symptoms?  Symptoms of this condition include:  · Minor weakness.  · Dizziness.  · Headache.  · Feeling heartbeats that are irregular or faster than normal (palpitations).  · Shortness of breath, especially with exercise.  · Paleness.  · Cold sensitivity.  · Indigestion.  · Nausea.  · Difficulty sleeping.  · Difficulty concentrating.  Symptoms may occur suddenly or develop slowly. If your anemia is mild, you may not have symptoms.  How is this diagnosed?  This condition is diagnosed based on:  · Blood tests.  · Your medical history.  · A physical exam.  · Bone marrow biopsy.  Your health care  provider may also check your stool (feces) for blood and may do additional testing to look for the cause of your bleeding.  You may also have other tests, including:  · Imaging tests, such as a CT scan or MRI.  · Endoscopy.  · Colonoscopy.  How is this treated?  Treatment for this condition depends on the cause. If you continue to lose a lot of blood, you may need to be treated at a hospital. Treatment may include:  · Taking supplements of iron, vitamin B12, or folic acid.  · Taking a hormone medicine (erythropoietin) that can help to stimulate red blood cell growth.  · Having a blood transfusion. This may be needed if you lose a lot of blood.  · Making changes to your diet.  · Having surgery to remove your spleen.  Follow these instructions at home:  · Take over-the-counter and prescription medicines only as told by your health care provider.  · Take supplements only as told by your health care provider.  · Follow any diet instructions that you were given.  · Keep all follow-up visits as told by your health care provider. This is important.  Contact a health care provider if:  · You develop new bleeding anywhere in the body.  Get help right away if:  · You are very weak.  · You are short of breath.  · You have pain in your abdomen or chest.  · You are dizzy or feel faint.  · You have trouble concentrating.  · You have bloody or black, tarry stools.  · You vomit repeatedly or you vomit up blood.  Summary  · Anemia is a condition in which you do not have enough red blood cells or enough of a substance in your red blood cells that carries oxygen (hemoglobin).  · Symptoms may occur suddenly or develop slowly.  · If your anemia is mild, you may not have symptoms.  · This condition is diagnosed with blood tests as well as a medical history and physical exam. Other tests may be needed.  · Treatment for this condition depends on the cause of the anemia.  This information is not intended to replace advice given to you by  your health care provider. Make sure you discuss any questions you have with your health care provider.  Document Released: 01/25/2006 Document Revised: 11/30/2018 Document Reviewed: 01/19/2018  Elsevier Patient Education © 2020 [employer]

## 2022-01-09 ENCOUNTER — OUTPATIENT INFUSION SERVICES (OUTPATIENT)
Dept: ONCOLOGY | Facility: MEDICAL CENTER | Age: 27
End: 2022-01-09
Attending: INTERNAL MEDICINE
Payer: COMMERCIAL

## 2022-01-09 VITALS
OXYGEN SATURATION: 99 % | DIASTOLIC BLOOD PRESSURE: 97 MMHG | HEART RATE: 74 BPM | TEMPERATURE: 97 F | SYSTOLIC BLOOD PRESSURE: 143 MMHG | RESPIRATION RATE: 18 BRPM

## 2022-01-09 DIAGNOSIS — D64.9 SYMPTOMATIC ANEMIA: ICD-10-CM

## 2022-01-09 LAB
ABO GROUP BLD: NORMAL
ANISOCYTOSIS BLD QL SMEAR: ABNORMAL
BASOPHILS # BLD AUTO: 0.9 % (ref 0–1.8)
BASOPHILS # BLD: 0.04 K/UL (ref 0–0.12)
BLD GP AB SCN SERPL QL: NORMAL
EOSINOPHIL # BLD AUTO: 0.07 K/UL (ref 0–0.51)
EOSINOPHIL NFR BLD: 1.8 % (ref 0–6.9)
ERYTHROCYTE [DISTWIDTH] IN BLOOD BY AUTOMATED COUNT: 63.1 FL (ref 35.9–50)
HCT VFR BLD AUTO: 27.9 % (ref 37–47)
HGB BLD-MCNC: 8.2 G/DL (ref 12–16)
HYPOCHROMIA BLD QL SMEAR: ABNORMAL
LYMPHOCYTES # BLD AUTO: 0.63 K/UL (ref 1–4.8)
LYMPHOCYTES NFR BLD: 15.3 % (ref 22–41)
MACROCYTES BLD QL SMEAR: ABNORMAL
MANUAL DIFF BLD: NORMAL
MCH RBC QN AUTO: 28.1 PG (ref 27–33)
MCHC RBC AUTO-ENTMCNC: 29.4 G/DL (ref 33.6–35)
MCV RBC AUTO: 95.5 FL (ref 81.4–97.8)
MICROCYTES BLD QL SMEAR: ABNORMAL
MONOCYTES # BLD AUTO: 0.15 K/UL (ref 0–0.85)
MONOCYTES NFR BLD AUTO: 3.6 % (ref 0–13.4)
MORPHOLOGY BLD-IMP: NORMAL
MYELOCYTES NFR BLD MANUAL: 0.9 %
NEUTROPHILS # BLD AUTO: 3.18 K/UL (ref 2–7.15)
NEUTROPHILS NFR BLD: 77.5 % (ref 44–72)
NRBC # BLD AUTO: 0 K/UL
NRBC BLD-RTO: 0 /100 WBC
PLATELET # BLD AUTO: 123 K/UL (ref 164–446)
PLATELET BLD QL SMEAR: NORMAL
PMV BLD AUTO: 11.5 FL (ref 9–12.9)
POLYCHROMASIA BLD QL SMEAR: NORMAL
RBC # BLD AUTO: 2.92 M/UL (ref 4.2–5.4)
RBC BLD AUTO: PRESENT
RH BLD: NORMAL
WBC # BLD AUTO: 4.1 K/UL (ref 4.8–10.8)

## 2022-01-09 PROCEDURE — 86900 BLOOD TYPING SEROLOGIC ABO: CPT

## 2022-01-09 PROCEDURE — 85027 COMPLETE CBC AUTOMATED: CPT

## 2022-01-09 PROCEDURE — 86901 BLOOD TYPING SEROLOGIC RH(D): CPT

## 2022-01-09 PROCEDURE — 36415 COLL VENOUS BLD VENIPUNCTURE: CPT

## 2022-01-09 PROCEDURE — 86850 RBC ANTIBODY SCREEN: CPT

## 2022-01-09 PROCEDURE — 85007 BL SMEAR W/DIFF WBC COUNT: CPT

## 2022-01-09 RX ORDER — 0.9 % SODIUM CHLORIDE 0.9 %
10 VIAL (ML) INJECTION PRN
Status: CANCELLED | OUTPATIENT
Start: 2022-01-09

## 2022-01-09 RX ORDER — 0.9 % SODIUM CHLORIDE 0.9 %
3 VIAL (ML) INJECTION PRN
Status: CANCELLED | OUTPATIENT
Start: 2022-01-09

## 2022-01-09 RX ORDER — ACETAMINOPHEN 325 MG/1
650 TABLET ORAL PRN
Status: CANCELLED | OUTPATIENT
Start: 2022-01-09

## 2022-01-09 RX ORDER — SODIUM CHLORIDE 9 MG/ML
INJECTION, SOLUTION INTRAVENOUS CONTINUOUS
Status: CANCELLED | OUTPATIENT
Start: 2022-01-09

## 2022-01-09 RX ORDER — ACETAMINOPHEN 325 MG/1
650 TABLET ORAL ONCE
Status: CANCELLED | OUTPATIENT
Start: 2022-01-09

## 2022-01-09 RX ORDER — HEPARIN SODIUM (PORCINE) LOCK FLUSH IV SOLN 100 UNIT/ML 100 UNIT/ML
500 SOLUTION INTRAVENOUS PRN
Status: CANCELLED | OUTPATIENT
Start: 2022-01-09

## 2022-01-09 RX ORDER — DIPHENHYDRAMINE HYDROCHLORIDE 50 MG/ML
25 INJECTION INTRAMUSCULAR; INTRAVENOUS PRN
Status: CANCELLED | OUTPATIENT
Start: 2022-01-09

## 2022-01-09 RX ORDER — DIPHENHYDRAMINE HCL 25 MG
25 TABLET ORAL ONCE
Status: CANCELLED | OUTPATIENT
Start: 2022-01-09 | End: 2022-01-09

## 2022-01-09 RX ORDER — 0.9 % SODIUM CHLORIDE 0.9 %
VIAL (ML) INJECTION PRN
Status: CANCELLED | OUTPATIENT
Start: 2022-01-09

## 2022-01-09 NOTE — PROGRESS NOTES
Lily arrives to [facility] for CBC/COD draw. Patient c/o left eyelid redness and swelling with unknown etiology; otherwise, feels well and voices no acute health concerns. Labs drawn venipuncture via LFA: Hgb = 8.2. Patient does NOT meet parameters for blood products on Tuesday. Spoke with patient regarding lab results. Upcoming appointment for blood transfusion cancelled.

## 2022-01-11 ENCOUNTER — APPOINTMENT (OUTPATIENT)
Dept: ONCOLOGY | Facility: MEDICAL CENTER | Age: 27
End: 2022-01-11
Attending: INTERNAL MEDICINE
Payer: COMMERCIAL

## 2022-01-13 ENCOUNTER — APPOINTMENT (OUTPATIENT)
Dept: RADIOLOGY | Facility: MEDICAL CENTER | Age: 27
End: 2022-01-13
Attending: EMERGENCY MEDICINE
Payer: COMMERCIAL

## 2022-01-13 ENCOUNTER — HOSPITAL ENCOUNTER (EMERGENCY)
Facility: MEDICAL CENTER | Age: 27
End: 2022-01-13
Attending: EMERGENCY MEDICINE
Payer: COMMERCIAL

## 2022-01-13 VITALS
HEART RATE: 74 BPM | RESPIRATION RATE: 15 BRPM | SYSTOLIC BLOOD PRESSURE: 127 MMHG | DIASTOLIC BLOOD PRESSURE: 74 MMHG | WEIGHT: 116.62 LBS | BODY MASS INDEX: 18.3 KG/M2 | TEMPERATURE: 97.3 F | HEIGHT: 67 IN | OXYGEN SATURATION: 100 %

## 2022-01-13 DIAGNOSIS — H00.014 HORDEOLUM EXTERNUM OF LEFT UPPER EYELID: ICD-10-CM

## 2022-01-13 DIAGNOSIS — R11.2 NON-INTRACTABLE VOMITING WITH NAUSEA, UNSPECIFIED VOMITING TYPE: ICD-10-CM

## 2022-01-13 DIAGNOSIS — R10.13 EPIGASTRIC PAIN: ICD-10-CM

## 2022-01-13 LAB
ALBUMIN SERPL BCP-MCNC: 3.1 G/DL (ref 3.2–4.9)
ALBUMIN/GLOB SERPL: 0.5 G/DL
ALP SERPL-CCNC: 73 U/L (ref 30–99)
ALT SERPL-CCNC: 9 U/L (ref 2–50)
ANION GAP SERPL CALC-SCNC: 9 MMOL/L (ref 7–16)
AST SERPL-CCNC: 21 U/L (ref 12–45)
BASOPHILS # BLD AUTO: 0.6 % (ref 0–1.8)
BASOPHILS # BLD: 0.03 K/UL (ref 0–0.12)
BILIRUB SERPL-MCNC: 0.3 MG/DL (ref 0.1–1.5)
BUN SERPL-MCNC: 30 MG/DL (ref 8–22)
CALCIUM SERPL-MCNC: 8.8 MG/DL (ref 8.4–10.2)
CHLORIDE SERPL-SCNC: 87 MMOL/L (ref 96–112)
CO2 SERPL-SCNC: 34 MMOL/L (ref 20–33)
CREAT SERPL-MCNC: 5.49 MG/DL (ref 0.5–1.4)
EOSINOPHIL # BLD AUTO: 0.05 K/UL (ref 0–0.51)
EOSINOPHIL NFR BLD: 1 % (ref 0–6.9)
ERYTHROCYTE [DISTWIDTH] IN BLOOD BY AUTOMATED COUNT: 61.9 FL (ref 35.9–50)
GLOBULIN SER CALC-MCNC: 5.7 G/DL (ref 1.9–3.5)
GLUCOSE SERPL-MCNC: 93 MG/DL (ref 65–99)
HCT VFR BLD AUTO: 27.2 % (ref 37–47)
HGB BLD-MCNC: 7.8 G/DL (ref 12–16)
IMM GRANULOCYTES # BLD AUTO: 0.02 K/UL (ref 0–0.11)
IMM GRANULOCYTES NFR BLD AUTO: 0.4 % (ref 0–0.9)
LACTATE BLD-SCNC: 1.3 MMOL/L (ref 0.5–2)
LIPASE SERPL-CCNC: 10 U/L (ref 7–58)
LYMPHOCYTES # BLD AUTO: 0.56 K/UL (ref 1–4.8)
LYMPHOCYTES NFR BLD: 11.4 % (ref 22–41)
MCH RBC QN AUTO: 27.2 PG (ref 27–33)
MCHC RBC AUTO-ENTMCNC: 28.7 G/DL (ref 33.6–35)
MCV RBC AUTO: 94.8 FL (ref 81.4–97.8)
MONOCYTES # BLD AUTO: 0.19 K/UL (ref 0–0.85)
MONOCYTES NFR BLD AUTO: 3.9 % (ref 0–13.4)
NEUTROPHILS # BLD AUTO: 4.08 K/UL (ref 2–7.15)
NEUTROPHILS NFR BLD: 82.7 % (ref 44–72)
NRBC # BLD AUTO: 0 K/UL
NRBC BLD-RTO: 0 /100 WBC
PLATELET # BLD AUTO: 130 K/UL (ref 164–446)
PMV BLD AUTO: 10.2 FL (ref 9–12.9)
POTASSIUM SERPL-SCNC: 5.9 MMOL/L (ref 3.6–5.5)
PROT SERPL-MCNC: 8.8 G/DL (ref 6–8.2)
RBC # BLD AUTO: 2.87 M/UL (ref 4.2–5.4)
SODIUM SERPL-SCNC: 130 MMOL/L (ref 135–145)
WBC # BLD AUTO: 4.9 K/UL (ref 4.8–10.8)

## 2022-01-13 PROCEDURE — 85025 COMPLETE CBC W/AUTO DIFF WBC: CPT

## 2022-01-13 PROCEDURE — 74176 CT ABD & PELVIS W/O CONTRAST: CPT

## 2022-01-13 PROCEDURE — 99284 EMERGENCY DEPT VISIT MOD MDM: CPT

## 2022-01-13 PROCEDURE — 700111 HCHG RX REV CODE 636 W/ 250 OVERRIDE (IP): Performed by: EMERGENCY MEDICINE

## 2022-01-13 PROCEDURE — 83690 ASSAY OF LIPASE: CPT

## 2022-01-13 PROCEDURE — 96374 THER/PROPH/DIAG INJ IV PUSH: CPT

## 2022-01-13 PROCEDURE — 80053 COMPREHEN METABOLIC PANEL: CPT

## 2022-01-13 PROCEDURE — 83605 ASSAY OF LACTIC ACID: CPT

## 2022-01-13 RX ORDER — ONDANSETRON 2 MG/ML
4 INJECTION INTRAMUSCULAR; INTRAVENOUS ONCE
Status: COMPLETED | OUTPATIENT
Start: 2022-01-13 | End: 2022-01-13

## 2022-01-13 RX ADMIN — ONDANSETRON 4 MG: 2 INJECTION INTRAMUSCULAR; INTRAVENOUS at 21:48

## 2022-01-13 ASSESSMENT — FIBROSIS 4 INDEX: FIB4 SCORE: 1.24

## 2022-01-14 NOTE — ED TRIAGE NOTES
"Chief Complaint   Patient presents with   • N/V     Pt walk-in with mother. Pt c/o mid abd pain & vomiting since yesterday. Last hemodialysis yesterday. Pt states started to feel ill before dialysis yesterday. Last oral intake today 16:00.     /97   Pulse 82   Temp 37.1 °C (98.7 °F) (Temporal)   Resp 16   Ht 1.702 m (5' 7\")   Wt 52.9 kg (116 lb 10 oz)   SpO2 100% RA    Has this patient been vaccinated for COVID YES  "

## 2022-01-14 NOTE — ED PROVIDER NOTES
ED Provider Note    CHIEF COMPLAINT  Chief Complaint   Patient presents with   • N/V     Pt walk-in with mother. Pt c/o mid abd pain & vomiting since yesterday. Last hemodialysis yesterday. Pt states started to feel ill before dialysis yesterday. Last oral intake today 16:00.       HPI  Lily Nicole is a 26 y.o. female who presents for evaluation of epigastric pain which started 2 days ago.  Patient notes that she received dialysis yesterday but did not start vomiting until yesterday evening.  She notes no fevers or chills and no blood in the vomit.  She has not had any diarrhea or constipation and notes no lower abdominal pain.  She states she has not been able to keep anything down by mouth for 24 hours.    REVIEW OF SYSTEMS  Constitutional: No fevers or chills  Skin: Left upper eyelid swelling, redness, and pain.  Otherwise no rashes or abrasions.  HEENT: No sore throat, runny nose.  Swelling, redness, and pain to left upper eyelid  Neck: No neck pain  Chest: No pain or rashes  Pulm: No shortness of breath, cough, wheezing, stridor, or pain with inspiration/expiration  Gastrointestinal: No  diarrhea, constipation, bloating, melena, or hematochezia  Genitourinary: Does not make urine   usculoskeletal: No recent trauma, pain, swelling, weakness  Neurologic: No sensory or motor changes. No confusion or disorientation.      PAST FAM HISTORY  Family History   Problem Relation Age of Onset   • Diabetes Paternal Grandmother        PAST MEDICAL HISTORY   has a past medical history of Anemia (01/17/2018), AVF (arteriovenous fistula) ([facility]), Chest pain, Chest tightness, Daytime sleepiness, Dialysis patient ([facility]), Difficulty breathing, ESRD (end stage renal disease) on dialysis ([facility]) (01/17/2018), Gasping for breath, Heart burn, Hypertension (01/17/2018), Indigestion, Lupus ([facility]), Migraines (01/17/2018), Painful breathing, Palpitations, Seizure ([facility]) (2013), Shortness of breath, Swelling of lower extremity, and  Wheezing.    SOCIAL HISTORY  Social History     Tobacco Use   • Smoking status: Never Smoker   • Smokeless tobacco: Never Used   Vaping Use   • Vaping Use: Never used   Substance and Sexual Activity   • Alcohol use: No   • Drug use: No   • Sexual activity: Not on file       SURGICAL HISTORY   has a past surgical history that includes ronak by laparoscopy (4/5/2010); av fistula creation (Right); angioplasty (01/17/2018); other; other; gastroscopy-endo (12/9/2019); gastroscopy (N/A, 5/30/2020); gastroscopy-endo (9/18/2020); upper gi endoscopy,ctrl bleed (11/12/2020); upper gi endoscopy,biopsy (11/12/2020); gastroscopy-endo (11/12/2020); colonoscopy,diagnostic (1/8/2021); upper gi endoscopy,diagnosis (1/8/2021); upper gi endoscopy,ctrl bleed (1/8/2021); upper gi endoscopy,diagnosis (3/5/2021); upper gi endoscopy,diagnosis (N/A, 3/19/2021); upper gi endoscopy,ctrl bleed (3/19/2021); and bronchoscopy,diagnostic (Bilateral, 5/13/2021).    CURRENT MEDICATIONS  Home Medications     Reviewed by Elizabeth Montoya R.N. (Registered Nurse) on 01/13/22 at 2024  Med List Status: Not Addressed   Medication Last Dose Status   acetaminophen (TYLENOL) 500 MG Tab  Active   amLODIPine (NORVASC) 5 MG Tab  Active   atenolol (TENORMIN) 25 MG Tab  Active   cloNIDine (CATAPRES) 0.2 MG/24HR PATCH WEEKLY patch  Active   hydroxychloroquine (PLAQUENIL) 200 MG Tab  Active   mycophenolate sodium (MYFORTIC) 360 MG Tablet Delayed Response tablet  Active   pantoprazole (PROTONIX) 40 MG Tablet Delayed Response  Active   predniSONE (DELTASONE) 5 MG Tab  Active                ALLERGIES  Allergies   Allergen Reactions   • Cephalexin Rash     Nausea and rash   Hives  .   • Clindamycin Rash     Nausea and rash     Hive  .   • Methylprednisolone      Anxious  Other reaction(s): Other-Reaction in Comments  Anxious   • Metoprolol Rash and Nausea     Nausea and rash     .   • Compazine      C/o anxiety   • Fentanyl And Related Anxiety   •  "Hydrocodone-Acetaminophen Rash and Nausea     Generalized rash  Generalized rash   • Maxipime [Cefepime] Itching   • Reglan [Metoclopramide] Anxiety   • Tape Rash     Paper tape is ok       PHYSICAL EXAM  VITAL SIGNS: /74   Pulse 74   Temp 36.3 °C (97.3 °F) (Temporal)   Resp 15   Ht 1.702 m (5' 7\")   Wt 52.9 kg (116 lb 10 oz)   LMP 01/03/2022 (Exact Date)   SpO2 100%   BMI 18.27 kg/m²    Gen: Alert in no apparent distress.  HEENT: No signs of trauma, Bilateral external ears normal, Nose normal. Conjunctiva normal, Non-icteric.   Cardiovascular: Regular rate and rhythm, no murmurs.  Capillary refill less than 3 seconds to all extremities  Thorax & Lungs: Normal breath sounds, No respiratory distress, No wheezing bilateral chest rise  Abdomen: Bowel sounds normal, Soft, mild to moderate epigastric tenderness, No masses, No pulsatile masses. No Guarding or rebound  Skin: Warm, Dry.  Sallow appearing skin.  Extremities: Intact distal pulses, No edema  Neurologic: Alert , no facial droop, grossly normal coordination and strength  Psychiatric: Affect pleasant      LABS  Results for orders placed or performed during the hospital encounter of 01/13/22   CBC WITH DIFFERENTIAL   Result Value Ref Range    WBC 4.9 4.8 - 10.8 K/uL    RBC 2.87 (L) 4.20 - 5.40 M/uL    Hemoglobin 7.8 (L) 12.0 - 16.0 g/dL    Hematocrit 27.2 (L) 37.0 - 47.0 %    MCV 94.8 81.4 - 97.8 fL    MCH 27.2 27.0 - 33.0 pg    MCHC 28.7 (L) 33.6 - 35.0 g/dL    RDW 61.9 (H) 35.9 - 50.0 fL    Platelet Count 130 (L) 164 - 446 K/uL    MPV 10.2 9.0 - 12.9 fL    Neutrophils-Polys 82.70 (H) 44.00 - 72.00 %    Lymphocytes 11.40 (L) 22.00 - 41.00 %    Monocytes 3.90 0.00 - 13.40 %    Eosinophils 1.00 0.00 - 6.90 %    Basophils 0.60 0.00 - 1.80 %    Immature Granulocytes 0.40 0.00 - 0.90 %    Nucleated RBC 0.00 /100 WBC    Neutrophils (Absolute) 4.08 2.00 - 7.15 K/uL    Lymphs (Absolute) 0.56 (L) 1.00 - 4.80 K/uL    Monos (Absolute) 0.19 0.00 - 0.85 K/uL    " Eos (Absolute) 0.05 0.00 - 0.51 K/uL    Baso (Absolute) 0.03 0.00 - 0.12 K/uL    Immature Granulocytes (abs) 0.02 0.00 - 0.11 K/uL    NRBC (Absolute) 0.00 K/uL   COMP METABOLIC PANEL   Result Value Ref Range    Sodium 130 (L) 135 - 145 mmol/L    Potassium 5.9 (H) 3.6 - 5.5 mmol/L    Chloride 87 (L) 96 - 112 mmol/L    Co2 34 (H) 20 - 33 mmol/L    Anion Gap 9.0 7.0 - 16.0    Glucose 93 65 - 99 mg/dL    Bun 30 (H) 8 - 22 mg/dL    Creatinine 5.49 (HH) 0.50 - 1.40 mg/dL    Calcium 8.8 8.4 - 10.2 mg/dL    AST(SGOT) 21 12 - 45 U/L    ALT(SGPT) 9 2 - 50 U/L    Alkaline Phosphatase 73 30 - 99 U/L    Total Bilirubin 0.3 0.1 - 1.5 mg/dL    Albumin 3.1 (L) 3.2 - 4.9 g/dL    Total Protein 8.8 (H) 6.0 - 8.2 g/dL    Globulin 5.7 (H) 1.9 - 3.5 g/dL    A-G Ratio 0.5 g/dL   LIPASE   Result Value Ref Range    Lipase 10 7 - 58 U/L   LACTIC ACID   Result Value Ref Range    Lactic Acid 1.3 0.5 - 2.0 mmol/L   ESTIMATED GFR   Result Value Ref Range    GFR If  12 (A) >60 mL/min/1.73 m 2    GFR If Non African American 10 (A) >60 mL/min/1.73 m 2       RADIOLOGY  CT-ABDOMEN-PELVIS W/O   Final Result         1.  Hepatomegaly   2.  Somewhat atrophic appearing bilateral kidneys          COURSE & MEDICAL DECISION MAKING  Patient arrives for evaluation of what appears to be acute on chronic epigastric pain in the setting of nausea and vomiting.  Although patient is not nauseous or vomiting here, she notes that she has not been able to keep anything down and about 24 hours, including her recently prescribed morphine.  Patient notes she has chronic pain in her hip which is why she was prescribed a narcotic.  She notes no fevers or chills and no blood in the vomit.  She does not have any significant lower abdominal pain but does have mild to moderate epigastric tenderness with no guarding or rebound.  Will perform laboratory evaluation and progress to CT imaging if she is not improving.  Particular concern is the possibility of a  perforated viscus  or other surgical issue in the upper abdomen.  He does not appear septic or toxic and does not have any vital sign abnormalities today.  11:41 PM  Patient's labs returned reassuring and with expected abnormalities given that she is on dialysis.  She does not have any CT findings to suggest an emergent process and certainly no evidence for perforated viscus, free air, or phlegmon.  She states understanding of this.  Patient does not appear septic or toxic on upon reevaluation and has been tolerating p.o. fluids.  I feel she is safe for discharge home to resume her current medication.  Likely this is gastric in nature and she will need to follow-up with her primary care physician for referral to a GI specialist if it is felt necessary.    FINAL IMPRESSION  1. Non-intractable vomiting with nausea, unspecified vomiting type    2. Epigastric pain    3. Hordeolum externum of left upper eyelid        Electronically signed by: Dudley Kirk M.D., 1/13/2022 9:32 PM

## 2022-01-19 ENCOUNTER — APPOINTMENT (OUTPATIENT)
Dept: RADIOLOGY | Facility: MEDICAL CENTER | Age: 27
End: 2022-01-19
Attending: EMERGENCY MEDICINE
Payer: COMMERCIAL

## 2022-01-19 ENCOUNTER — HOSPITAL ENCOUNTER (EMERGENCY)
Facility: MEDICAL CENTER | Age: 27
End: 2022-01-19
Attending: EMERGENCY MEDICINE
Payer: COMMERCIAL

## 2022-01-19 VITALS
SYSTOLIC BLOOD PRESSURE: 155 MMHG | WEIGHT: 115 LBS | DIASTOLIC BLOOD PRESSURE: 97 MMHG | BODY MASS INDEX: 18.05 KG/M2 | HEART RATE: 117 BPM | TEMPERATURE: 99.5 F | RESPIRATION RATE: 21 BRPM | OXYGEN SATURATION: 100 % | HEIGHT: 67 IN

## 2022-01-19 DIAGNOSIS — G89.29 CHRONIC CHEST PAIN: ICD-10-CM

## 2022-01-19 DIAGNOSIS — R07.9 CHRONIC CHEST PAIN: ICD-10-CM

## 2022-01-19 DIAGNOSIS — F41.9 ANXIETY: ICD-10-CM

## 2022-01-19 DIAGNOSIS — M54.50 CHRONIC MIDLINE LOW BACK PAIN WITHOUT SCIATICA: ICD-10-CM

## 2022-01-19 DIAGNOSIS — G89.29 CHRONIC MIDLINE LOW BACK PAIN WITHOUT SCIATICA: ICD-10-CM

## 2022-01-19 LAB
ALBUMIN SERPL BCP-MCNC: 2.8 G/DL (ref 3.2–4.9)
ALBUMIN/GLOB SERPL: 0.5 G/DL
ALP SERPL-CCNC: 79 U/L (ref 30–99)
ALT SERPL-CCNC: 11 U/L (ref 2–50)
ANION GAP SERPL CALC-SCNC: 10 MMOL/L (ref 7–16)
AST SERPL-CCNC: 20 U/L (ref 12–45)
BASOPHILS # BLD AUTO: 0.5 % (ref 0–1.8)
BASOPHILS # BLD: 0.02 K/UL (ref 0–0.12)
BILIRUB SERPL-MCNC: 0.3 MG/DL (ref 0.1–1.5)
BUN SERPL-MCNC: 12 MG/DL (ref 8–22)
CALCIUM SERPL-MCNC: 8.5 MG/DL (ref 8.5–10.5)
CHLORIDE SERPL-SCNC: 91 MMOL/L (ref 96–112)
CO2 SERPL-SCNC: 32 MMOL/L (ref 20–33)
CREAT SERPL-MCNC: 2.73 MG/DL (ref 0.5–1.4)
EKG IMPRESSION: NORMAL
EOSINOPHIL # BLD AUTO: 0.02 K/UL (ref 0–0.51)
EOSINOPHIL NFR BLD: 0.5 % (ref 0–6.9)
ERYTHROCYTE [DISTWIDTH] IN BLOOD BY AUTOMATED COUNT: 60.1 FL (ref 35.9–50)
FLUAV RNA SPEC QL NAA+PROBE: NEGATIVE
FLUBV RNA SPEC QL NAA+PROBE: NEGATIVE
GLOBULIN SER CALC-MCNC: 6 G/DL (ref 1.9–3.5)
GLUCOSE SERPL-MCNC: 93 MG/DL (ref 65–99)
HCT VFR BLD AUTO: 24.9 % (ref 37–47)
HGB BLD-MCNC: 7.5 G/DL (ref 12–16)
IMM GRANULOCYTES # BLD AUTO: 0.07 K/UL (ref 0–0.11)
IMM GRANULOCYTES NFR BLD AUTO: 1.6 % (ref 0–0.9)
LYMPHOCYTES # BLD AUTO: 0.41 K/UL (ref 1–4.8)
LYMPHOCYTES NFR BLD: 9.4 % (ref 22–41)
MCH RBC QN AUTO: 28.1 PG (ref 27–33)
MCHC RBC AUTO-ENTMCNC: 30.1 G/DL (ref 33.6–35)
MCV RBC AUTO: 93.3 FL (ref 81.4–97.8)
MONOCYTES # BLD AUTO: 0.34 K/UL (ref 0–0.85)
MONOCYTES NFR BLD AUTO: 7.8 % (ref 0–13.4)
NEUTROPHILS # BLD AUTO: 3.5 K/UL (ref 2–7.15)
NEUTROPHILS NFR BLD: 80.2 % (ref 44–72)
NRBC # BLD AUTO: 0 K/UL
NRBC BLD-RTO: 0 /100 WBC
PLATELET # BLD AUTO: 115 K/UL (ref 164–446)
PMV BLD AUTO: 10.4 FL (ref 9–12.9)
POTASSIUM SERPL-SCNC: 4.2 MMOL/L (ref 3.6–5.5)
PROT SERPL-MCNC: 8.8 G/DL (ref 6–8.2)
RBC # BLD AUTO: 2.67 M/UL (ref 4.2–5.4)
RSV RNA SPEC QL NAA+PROBE: NEGATIVE
SARS-COV-2 RNA RESP QL NAA+PROBE: NOTDETECTED
SODIUM SERPL-SCNC: 133 MMOL/L (ref 135–145)
SPECIMEN SOURCE: NORMAL
TROPONIN T SERPL-MCNC: 599 NG/L (ref 6–19)
WBC # BLD AUTO: 4.4 K/UL (ref 4.8–10.8)

## 2022-01-19 PROCEDURE — 700111 HCHG RX REV CODE 636 W/ 250 OVERRIDE (IP): Performed by: EMERGENCY MEDICINE

## 2022-01-19 PROCEDURE — 93005 ELECTROCARDIOGRAM TRACING: CPT

## 2022-01-19 PROCEDURE — 700102 HCHG RX REV CODE 250 W/ 637 OVERRIDE(OP): Performed by: EMERGENCY MEDICINE

## 2022-01-19 PROCEDURE — 71045 X-RAY EXAM CHEST 1 VIEW: CPT

## 2022-01-19 PROCEDURE — 93005 ELECTROCARDIOGRAM TRACING: CPT | Performed by: EMERGENCY MEDICINE

## 2022-01-19 PROCEDURE — 99285 EMERGENCY DEPT VISIT HI MDM: CPT

## 2022-01-19 PROCEDURE — A9270 NON-COVERED ITEM OR SERVICE: HCPCS | Performed by: EMERGENCY MEDICINE

## 2022-01-19 PROCEDURE — 96375 TX/PRO/DX INJ NEW DRUG ADDON: CPT

## 2022-01-19 PROCEDURE — 36415 COLL VENOUS BLD VENIPUNCTURE: CPT

## 2022-01-19 PROCEDURE — 96374 THER/PROPH/DIAG INJ IV PUSH: CPT

## 2022-01-19 PROCEDURE — 80053 COMPREHEN METABOLIC PANEL: CPT

## 2022-01-19 PROCEDURE — 85025 COMPLETE CBC W/AUTO DIFF WBC: CPT

## 2022-01-19 PROCEDURE — C9803 HOPD COVID-19 SPEC COLLECT: HCPCS | Performed by: EMERGENCY MEDICINE

## 2022-01-19 PROCEDURE — 0241U HCHG SARS-COV-2 COVID-19 NFCT DS RESP RNA 4 TRGT MIC: CPT

## 2022-01-19 PROCEDURE — 84484 ASSAY OF TROPONIN QUANT: CPT

## 2022-01-19 RX ORDER — ACETAMINOPHEN 325 MG/1
650 TABLET ORAL ONCE
Status: COMPLETED | OUTPATIENT
Start: 2022-01-19 | End: 2022-01-19

## 2022-01-19 RX ORDER — MORPHINE SULFATE 4 MG/ML
4 INJECTION INTRAVENOUS ONCE
Status: COMPLETED | OUTPATIENT
Start: 2022-01-19 | End: 2022-01-19

## 2022-01-19 RX ORDER — HYDRALAZINE HYDROCHLORIDE 20 MG/ML
10 INJECTION INTRAMUSCULAR; INTRAVENOUS ONCE
Status: COMPLETED | OUTPATIENT
Start: 2022-01-19 | End: 2022-01-19

## 2022-01-19 RX ADMIN — ACETAMINOPHEN 650 MG: 325 TABLET, FILM COATED ORAL at 16:48

## 2022-01-19 RX ADMIN — HYDRALAZINE HYDROCHLORIDE 10 MG: 20 INJECTION INTRAMUSCULAR; INTRAVENOUS at 14:46

## 2022-01-19 RX ADMIN — MORPHINE SULFATE 4 MG: 4 INJECTION INTRAVENOUS at 14:46

## 2022-01-19 ASSESSMENT — FIBROSIS 4 INDEX: FIB4 SCORE: 1.29

## 2022-01-19 NOTE — ED TRIAGE NOTES
BIB [facility] to triage  Chief Complaint   Patient presents with   • Chest Pain     x5 hours, started after dilaysis today   • Low Back Pain     Pt had dialysis this morning, CP started after that, had 2.5L pulled off. Her CP is 10/10 and is worse with a deep breath. Hx of pleural effusions in November 2021, this feels like the same pain.

## 2022-01-19 NOTE — ED PROVIDER NOTES
"ED Provider Note    CHIEF COMPLAINT  Chief Complaint   Patient presents with   • Chest Pain     x5 hours, started after dilaysis today   • Low Back Pain       HPI  Lily Nicole is a 26 y.o. female who presents with complaint of anterior and posterior chest and thoracic pain.  Worse with deep breath.  Onset at the end of her dialysis session today.  She presents hypertensive and tachycardic.  Patient with long history of chronic pain, currently takes daily morphine.  No cough.  Review of chart shows work-up 6 days ago for abdominal pain, negative CT scan at that time, she states although symptoms have resolved.  Review of chart shows last November, she had chest pain work-up, she chronically has elevated troponin on review of past results.  Patient had negative echocardiogram in November.  It is felt she has chronic chest pain.  No diaphoresis, no dizziness, no vomiting.  She denies shortness of breath but does states the pain is worse with deep breath.  She states pain today is similar to previous episodes.    REVIEW OF SYSTEMS    Constitutional: No fever   Respiratory: Pleuritic chest pain,  Cardiac: Chest pain  Gastrointestinal: Flat  Musculoskeletal: back pain  Neurologic: No headache       All other systems are negative.       PAST MEDICAL HISTORY  Past Medical History:   Diagnosis Date   • Anemia 01/17/2018   • AVF (arteriovenous fistula) ([facility])     Right Arm   • Chest pain    • Chest tightness    • Daytime sleepiness    • Dialysis patient ([facility])      dialysis, M,W,F Elizabeth/Joni   • Difficulty breathing    • ESRD (end stage renal disease) on dialysis ([facility]) 01/17/2018    Twan Fu   • Gasping for breath    • Heart burn    • Hypertension 01/17/2018    \"Controlled with medication\"   • Indigestion    • Lupus ([facility])    • Migraines 01/17/2018   • Painful breathing    • Palpitations    • Seizure ([facility]) 2013    from high blood pressure, reports 1 time event   • Shortness of breath    • Swelling of lower extremity  "   • Wheezing        FAMILY HISTORY  Family History   Problem Relation Age of Onset   • Diabetes Paternal Grandmother        SOCIAL HISTORY  Social History     Socioeconomic History   • Marital status: Single     Spouse name: Not on file   • Number of children: Not on file   • Years of education: Not on file   • Highest education level: Not on file   Occupational History   • Not on file   Tobacco Use   • Smoking status: Never Smoker   • Smokeless tobacco: Never Used   Vaping Use   • Vaping Use: Never used   Substance and Sexual Activity   • Alcohol use: No   • Drug use: No   • Sexual activity: Not on file   Other Topics Concern   • Not on file   Social History Narrative   • Not on file     Social Determinants of Health     Financial Resource Strain: Low Risk    • Difficulty of Paying Living Expenses: Not hard at all   Food Insecurity: No Food Insecurity   • Worried About Running Out of Food in the Last Year: Never true   • Ran Out of Food in the Last Year: Never true   Transportation Needs: No Transportation Needs   • Lack of Transportation (Medical): No   • Lack of Transportation (Non-Medical): No   Physical Activity:    • Days of Exercise per Week: Not on file   • Minutes of Exercise per Session: Not on file   Stress:    • Feeling of Stress : Not on file   Social Connections:    • Frequency of Communication with Friends and Family: Not on file   • Frequency of Social Gatherings with Friends and Family: Not on file   • Attends Spiritism Services: Not on file   • Active Member of Clubs or Organizations: Not on file   • Attends Club or Organization Meetings: Not on file   • Marital Status: Not on file   Intimate Partner Violence:    • Fear of Current or Ex-Partner: Not on file   • Emotionally Abused: Not on file   • Physically Abused: Not on file   • Sexually Abused: Not on file   Housing Stability:    • Unable to Pay for Housing in the Last Year: Not on file   • Number of Places Lived in the Last Year: Not on file    • Unstable Housing in the Last Year: Not on file       SURGICAL HISTORY  Past Surgical History:   Procedure Laterality Date   • WY BRONCHOSCOPY,DIAGNOSTIC Bilateral 5/13/2021    Procedure: BRONCHOSCOPY, BRONCHOALVEOLAR LAVAGE;  Surgeon: William Spangler M.D.;  Location: [facility];  Service: Pulmonary   • WY UPPER GI ENDOSCOPY,DIAGNOSIS N/A 3/19/2021    Procedure: GASTROSCOPY;  Surgeon: Waylon Mcqueen M.D.;  Location: [facility];  Service: Gastroenterology   • WY UPPER GI ENDOSCOPY,CTRL BLEED  3/19/2021    Procedure: GASTROSCOPY, WITH ARGON PLASMA COAGULATION;  Surgeon: Waylon Mcqueen M.D.;  Location: [facility];  Service: Gastroenterology   • WY UPPER GI ENDOSCOPY,DIAGNOSIS  3/5/2021    Procedure: GASTROSCOPY - W/HEMOSTASIS;  Surgeon: Ej Silva M.D.;  Location: [facility];  Service: Gastroenterology   • WY COLONOSCOPY,DIAGNOSTIC  1/8/2021    Procedure: COLONOSCOPY;  Surgeon: Herbert Contreras M.D.;  Location: [facility];  Service: Gastroenterology   • WY UPPER GI ENDOSCOPY,DIAGNOSIS  1/8/2021    Procedure: GASTROSCOPY;  Surgeon: Herbert Contreras M.D.;  Location: [facility];  Service: Gastroenterology   • WY UPPER GI ENDOSCOPY,CTRL BLEED  1/8/2021    Procedure: GASTROSCOPY, WITH ARGON PLASMA COAGULATION;  Surgeon: Herbert Contreras M.D.;  Location: [facility];  Service: Gastroenterology   • WY UPPER GI ENDOSCOPY,CTRL BLEED  11/12/2020    Procedure: GASTROSCOPY, WITH ARGON PLASMA COAGULATION;  Surgeon: Gadiel Whitney M.D.;  Location: [facility];  Service: Gastroenterology   • WY UPPER GI ENDOSCOPY,BIOPSY  11/12/2020    Procedure: GASTROSCOPY, WITH BIOPSY;  Surgeon: Gadiel Whitney M.D.;  Location: [facility];  Service: Gastroenterology   • GASTROSCOPY-ENDO  11/12/2020    Procedure: GASTROSCOPY;  Surgeon: Gadiel Whitney M.D.;  Location: [facility];  Service: Gastroenterology   • GASTROSCOPY-ENDO  9/18/2020     "Procedure: GASTROSCOPY;  Surgeon: Gadiel Whitney M.D.;  Location: SURGERY [facility];  Service: Gastroenterology   • GASTROSCOPY N/A 5/30/2020    Procedure: GASTROSCOPY;  Surgeon: Waylon Mcqueen M.D.;  Location: [facility];  Service: Gastroenterology   • GASTROSCOPY-ENDO  12/9/2019    Procedure: GASTROSCOPY;  Surgeon: Aaron Kam M.D.;  Location: SURGERY [facility];  Service: Gastroenterology   • ANGIOPLASTY  01/17/2018    \"Right Arm AV-Fistulagram & Angioplastyx3\"   • ULI BY LAPAROSCOPY  4/5/2010    Performed by SYED MARTELL at SURGERY [facility]   • AV FISTULA CREATION Right    • OTHER      renal biopsy x 3   • OTHER      bone marrow biopsy       CURRENT MEDICATIONS  Home Medications     Reviewed by Karrie Casper R.N. (Registered Nurse) on 01/19/22 at 1412  Med List Status: Complete   Medication Last Dose Status   acetaminophen (TYLENOL) 500 MG Tab 1/19/2022 Active   amLODIPine (NORVASC) 5 MG Tab 1/19/2022 Active   atenolol (TENORMIN) 25 MG Tab 1/19/2022 Active   cloNIDine (CATAPRES) 0.2 MG/24HR PATCH WEEKLY patch 1/18/2022 Active   hydroxychloroquine (PLAQUENIL) 200 MG Tab 1/19/2022 Active   mycophenolate sodium (MYFORTIC) 360 MG Tablet Delayed Response tablet 1/19/2022 Active   pantoprazole (PROTONIX) 40 MG Tablet Delayed Response 1/19/2022 Active   predniSONE (DELTASONE) 5 MG Tab 1/19/2022 Active                ALLERGIES  Allergies   Allergen Reactions   • Cephalexin Rash     Nausea and rash   Hives  .   • Clindamycin Rash     Nausea and rash     Hive  .   • Methylprednisolone      Anxious  Other reaction(s): Other-Reaction in Comments  Anxious   • Metoprolol Rash and Nausea     Nausea and rash     .   • Compazine      C/o anxiety   • Fentanyl And Related Anxiety   • Hydrocodone-Acetaminophen Rash and Nausea     Generalized rash  Generalized rash   • Maxipime [Cefepime] Itching   • Reglan [Metoclopramide] Anxiety   • Tape Rash     Paper tape is ok " "      PHYSICAL EXAM  VITAL SIGNS: BP (!) 179/122   Pulse (!) 108   Temp 37.5 °C (99.5 °F) (Temporal)   Resp 16   Ht 1.702 m (5' 7\")   Wt 52.2 kg (115 lb)   LMP 01/03/2022 (Exact Date)   SpO2 100%   BMI 18.01 kg/m²   Constitutional:  Non-toxic appearance.   HENT: No facial swelling  Eyes: Anicteric, no conjunctivitis.     Cardiovascular: Normal heart rate, Normal rhythm  Pulmonary:  No wheezing, No rales.   Gastrointestinal: Soft, No tenderness, No masses  Skin: Warm, Dry, No cyanosis.   Neurologic: Speech is clear, follows commands, facial expression is symmetrical.  Psychiatric: Affect normal,  Mood normal.  Patient is anxious and cooperative  Musculoskeletal: Lumbar tenderness. Sternal tenderness.    EKG/Labs  Results for orders placed or performed during the hospital encounter of 01/19/22   CBC with Differential   Result Value Ref Range    WBC 4.4 (L) 4.8 - 10.8 K/uL    RBC 2.67 (L) 4.20 - 5.40 M/uL    Hemoglobin 7.5 (L) 12.0 - 16.0 g/dL    Hematocrit 24.9 (L) 37.0 - 47.0 %    MCV 93.3 81.4 - 97.8 fL    MCH 28.1 27.0 - 33.0 pg    MCHC 30.1 (L) 33.6 - 35.0 g/dL    RDW 60.1 (H) 35.9 - 50.0 fL    Platelet Count 115 (L) 164 - 446 K/uL    MPV 10.4 9.0 - 12.9 fL    Neutrophils-Polys 80.20 (H) 44.00 - 72.00 %    Lymphocytes 9.40 (L) 22.00 - 41.00 %    Monocytes 7.80 0.00 - 13.40 %    Eosinophils 0.50 0.00 - 6.90 %    Basophils 0.50 0.00 - 1.80 %    Immature Granulocytes 1.60 (H) 0.00 - 0.90 %    Nucleated RBC 0.00 /100 WBC    Neutrophils (Absolute) 3.50 2.00 - 7.15 K/uL    Lymphs (Absolute) 0.41 (L) 1.00 - 4.80 K/uL    Monos (Absolute) 0.34 0.00 - 0.85 K/uL    Eos (Absolute) 0.02 0.00 - 0.51 K/uL    Baso (Absolute) 0.02 0.00 - 0.12 K/uL    Immature Granulocytes (abs) 0.07 0.00 - 0.11 K/uL    NRBC (Absolute) 0.00 K/uL   Complete Metabolic Panel (CMP)   Result Value Ref Range    Sodium 133 (L) 135 - 145 mmol/L    Potassium 4.2 3.6 - 5.5 mmol/L    Chloride 91 (L) 96 - 112 mmol/L    Co2 32 20 - 33 mmol/L    Anion " Gap 10.0 7.0 - 16.0    Glucose 93 65 - 99 mg/dL    Bun 12 8 - 22 mg/dL    Creatinine 2.73 (H) 0.50 - 1.40 mg/dL    Calcium 8.5 8.5 - 10.5 mg/dL    AST(SGOT) 20 12 - 45 U/L    ALT(SGPT) 11 2 - 50 U/L    Alkaline Phosphatase 79 30 - 99 U/L    Total Bilirubin 0.3 0.1 - 1.5 mg/dL    Albumin 2.8 (L) 3.2 - 4.9 g/dL    Total Protein 8.8 (H) 6.0 - 8.2 g/dL    Globulin 6.0 (H) 1.9 - 3.5 g/dL    A-G Ratio 0.5 g/dL   Troponin   Result Value Ref Range    Troponin T 599 (H) 6 - 19 ng/L   COV-2, FLU A/B, AND RSV BY PCR (2-4 HOURS CEPHEID): Collect NP swab in VTM    Specimen: Respirate   Result Value Ref Range    Influenza virus A RNA Negative Negative    Influenza virus B, PCR Negative Negative    RSV, PCR Negative Negative    SARS-CoV-2 by PCR NotDetected     SARS-CoV-2 Source NP Swab    ESTIMATED GFR   Result Value Ref Range    GFR If  26 (A) >60 mL/min/1.73 m 2    GFR If Non African American 21 (A) >60 mL/min/1.73 m 2   EKG   Result Value Ref Range    Report       [facility] Emergency Dept.    Test Date:  2022  Pt Name:    CASE WOODSON            Department: ER  MRN:        [id number]                      Room:        16  Gender:     Female                       Technician: [id number]  :        1995                   Requested By:ER TRIAGE PROTOCOL  Order #:    [id number]                    Reading MD: PRERNA LAROSE MD    Measurements  Intervals                                Axis  Rate:       101                          P:          68  SD:         160                          QRS:        -12  QRSD:       80                           T:          122  QT:         340  QTc:        441    Interpretive Statements  SINUS TACHYCARDIA  PROBABLE LVH WITH SECONDARY REPOL ABNRM  Compared to ECG 2021 05:00:12  No acute ischemia, no arrhythmia  Electronically Signed On 2022 14:31:58 PST by PRERNA LAROSE MD           RADIOLOGY/PROCEDURES  DX-CHEST-PORTABLE (1 VIEW)    Final Result      1.  Possible minimal pulmonary edema.   2.  No pneumonia or pneumothorax.            COURSE & MEDICAL DECISION MAKING  Pertinent Labs & Imaging studies reviewed. (See chart for details)  Differential diagnosis includes infection, musculoskeletal pain, cardiac pain, malingering, esophageal pain. Chart states patient has been previously evaluated for pulmonary embolism, found to be negative. Patient treated with a dose of morphine for pain control, some relief. At times I do see her pulm sitting at the bedside. She comes anxious while talking to her. The troponin is elevated however currently 100 points less than her baseline. Patient had low-grade fever of 99.5 with tachycardia, white blood cell count is 4.4, COVID test was obtained and is negative.  The patient's anemia is at her baseline.  No pneumonia on chest x-ray. Etiology of her chest pain is unknown. Given benefit of doubt, she was administered 4 mg of morphine in the emergency department. She requested medication then for anxiety, I have offered Atarax, she refused. She appears calm at this time. Review of chart shows multiple frequent visits for both chest pain and low back pain complaints. She does state her abdominal pain from last week has resolved. Given extensive work-up in the past, recent check for PE in November, recent cardiac work-up, suspect etiology of her discomfort is musculoskeletal. Patient's EKG is negative. I recommended she continue working with her chronic pain specialist, and to return for any concerns.    FINAL IMPRESSION     1. Anxiety     2. Chronic chest pain     3. Chronic midline low back pain without sciatica              Electronically signed by: Vinod Guzman M.D., 1/19/2022 2:31 PM      Addendum: Patient calm, cooperative and no distress on completion of her test results during her discussion.  10 minutes later was crying and screaming out loud demanding pain medication.  Similar to prior reports in  her chart, patient is repetitive in her request for IV narcotic pain medicine.  I suspect some of her tachycardia to be related to her anxiety.  Her blood pressure has improved after the IV hydralazine and 4 mg dose of morphine.  I believe the patient safe for discharge.

## 2022-01-19 NOTE — ED NOTES
Pt said her pain is coming back and is making her fel very anxious. Requesting meds for anxiety, ERP aware.

## 2022-01-20 NOTE — ED NOTES
Pt crying in pain again, pt aware she will not get another dose of pain meds before leaving, but will be able to get her tylenol.

## 2022-01-20 NOTE — ED NOTES
Given discharge instructions, verbalized understanding, left with all belongings, wheeled to ED lobby.

## 2022-01-20 NOTE — DISCHARGE INSTRUCTIONS
Follow-up with your primary doctor soon as possible    Please continue your appointments for kidney dialysis    Return for any concerns

## 2022-01-23 ENCOUNTER — OUTPATIENT INFUSION SERVICES (OUTPATIENT)
Dept: ONCOLOGY | Facility: MEDICAL CENTER | Age: 27
End: 2022-01-23
Attending: INTERNAL MEDICINE
Payer: COMMERCIAL

## 2022-01-23 DIAGNOSIS — D64.9 SYMPTOMATIC ANEMIA: ICD-10-CM

## 2022-01-23 LAB
ABO GROUP BLD: NORMAL
ANISOCYTOSIS BLD QL SMEAR: ABNORMAL
BARCODED ABORH UBTYP: 5100
BARCODED PRD CODE UBPRD: NORMAL
BARCODED UNIT NUM UBUNT: NORMAL
BASOPHILS # BLD AUTO: 0 % (ref 0–1.8)
BASOPHILS # BLD: 0 K/UL (ref 0–0.12)
BLD GP AB SCN SERPL QL: NORMAL
COMPONENT R 8504R: NORMAL
EOSINOPHIL # BLD AUTO: 0 K/UL (ref 0–0.51)
EOSINOPHIL NFR BLD: 0 % (ref 0–6.9)
ERYTHROCYTE [DISTWIDTH] IN BLOOD BY AUTOMATED COUNT: 59.5 FL (ref 35.9–50)
HCT VFR BLD AUTO: 25.5 % (ref 37–47)
HGB BLD-MCNC: 7.5 G/DL (ref 12–16)
HYPOCHROMIA BLD QL SMEAR: ABNORMAL
LYMPHOCYTES # BLD AUTO: 0.46 K/UL (ref 1–4.8)
LYMPHOCYTES NFR BLD: 9.9 % (ref 22–41)
MANUAL DIFF BLD: NORMAL
MCH RBC QN AUTO: 26.9 PG (ref 27–33)
MCHC RBC AUTO-ENTMCNC: 29.4 G/DL (ref 33.6–35)
MCV RBC AUTO: 91.4 FL (ref 81.4–97.8)
MONOCYTES # BLD AUTO: 0.13 K/UL (ref 0–0.85)
MONOCYTES NFR BLD AUTO: 2.8 % (ref 0–13.4)
MORPHOLOGY BLD-IMP: NORMAL
NEUTROPHILS # BLD AUTO: 4.02 K/UL (ref 2–7.15)
NEUTROPHILS NFR BLD: 87.3 % (ref 44–72)
NRBC # BLD AUTO: 0 K/UL
NRBC BLD-RTO: 0 /100 WBC
PLATELET # BLD AUTO: ABNORMAL K/UL (ref 164–446)
PMV BLD AUTO: 10.4 FL (ref 9–12.9)
POIKILOCYTOSIS BLD QL SMEAR: NORMAL
POLYCHROMASIA BLD QL SMEAR: NORMAL
PRODUCT TYPE UPROD: NORMAL
RBC # BLD AUTO: 2.79 M/UL (ref 4.2–5.4)
RBC BLD AUTO: PRESENT
RH BLD: NORMAL
UNIT STATUS USTAT: NORMAL
WBC # BLD AUTO: 4.6 K/UL (ref 4.8–10.8)

## 2022-01-23 PROCEDURE — 36415 COLL VENOUS BLD VENIPUNCTURE: CPT

## 2022-01-23 PROCEDURE — 85027 COMPLETE CBC AUTOMATED: CPT

## 2022-01-23 PROCEDURE — 86901 BLOOD TYPING SEROLOGIC RH(D): CPT

## 2022-01-23 PROCEDURE — 86850 RBC ANTIBODY SCREEN: CPT

## 2022-01-23 PROCEDURE — 86922 COMPATIBILITY TEST ANTIGLOB: CPT

## 2022-01-23 PROCEDURE — 86900 BLOOD TYPING SEROLOGIC ABO: CPT

## 2022-01-23 PROCEDURE — 85007 BL SMEAR W/DIFF WBC COUNT: CPT

## 2022-01-23 RX ORDER — SODIUM CHLORIDE 9 MG/ML
INJECTION, SOLUTION INTRAVENOUS CONTINUOUS
Status: CANCELLED | OUTPATIENT
Start: 2022-01-23

## 2022-01-23 RX ORDER — 0.9 % SODIUM CHLORIDE 0.9 %
10 VIAL (ML) INJECTION PRN
Status: CANCELLED | OUTPATIENT
Start: 2022-01-23

## 2022-01-23 RX ORDER — DIPHENHYDRAMINE HCL 25 MG
25 TABLET ORAL ONCE
Status: CANCELLED | OUTPATIENT
Start: 2022-01-23 | End: 2022-01-23

## 2022-01-23 RX ORDER — 0.9 % SODIUM CHLORIDE 0.9 %
VIAL (ML) INJECTION PRN
Status: CANCELLED | OUTPATIENT
Start: 2022-01-23

## 2022-01-23 RX ORDER — 0.9 % SODIUM CHLORIDE 0.9 %
3 VIAL (ML) INJECTION PRN
Status: CANCELLED | OUTPATIENT
Start: 2022-01-23

## 2022-01-23 RX ORDER — DIPHENHYDRAMINE HYDROCHLORIDE 50 MG/ML
25 INJECTION INTRAMUSCULAR; INTRAVENOUS PRN
Status: CANCELLED | OUTPATIENT
Start: 2022-01-23

## 2022-01-23 RX ORDER — HEPARIN SODIUM (PORCINE) LOCK FLUSH IV SOLN 100 UNIT/ML 100 UNIT/ML
500 SOLUTION INTRAVENOUS PRN
Status: CANCELLED | OUTPATIENT
Start: 2022-01-23

## 2022-01-23 RX ORDER — ACETAMINOPHEN 325 MG/1
650 TABLET ORAL ONCE
Status: CANCELLED | OUTPATIENT
Start: 2022-01-23

## 2022-01-23 RX ORDER — ACETAMINOPHEN 325 MG/1
650 TABLET ORAL PRN
Status: CANCELLED | OUTPATIENT
Start: 2022-01-23

## 2022-01-23 NOTE — PROGRESS NOTES
Lily arrives to [facility] for a CBC/COD draw. Labs drawn via LFA. Hgb 7.5; Hct 25.5. Patient DOES meet parameters to receive 1 unit of irradiated PRBCs on 1/25. Patient called to discuss lab results. Confirmed appt on 1/25. Discharged home to self care in no apparent distress.     *please also redraw platelet on 1/25 - sample with fibrin per hematology

## 2022-01-24 PROCEDURE — 86902 BLOOD TYPE ANTIGEN DONOR EA: CPT | Mod: 91

## 2022-01-25 ENCOUNTER — OUTPATIENT INFUSION SERVICES (OUTPATIENT)
Dept: ONCOLOGY | Facility: MEDICAL CENTER | Age: 27
End: 2022-01-25
Attending: INTERNAL MEDICINE
Payer: COMMERCIAL

## 2022-01-25 VITALS
OXYGEN SATURATION: 100 % | RESPIRATION RATE: 18 BRPM | TEMPERATURE: 97.7 F | WEIGHT: 113.1 LBS | HEIGHT: 66 IN | HEART RATE: 66 BPM | BODY MASS INDEX: 18.18 KG/M2 | DIASTOLIC BLOOD PRESSURE: 110 MMHG | SYSTOLIC BLOOD PRESSURE: 152 MMHG

## 2022-01-25 DIAGNOSIS — D64.9 SYMPTOMATIC ANEMIA: ICD-10-CM

## 2022-01-25 LAB
BARCODED ABORH UBTYP: 5100
BARCODED PRD CODE UBPRD: NORMAL
BARCODED UNIT NUM UBUNT: NORMAL
COMPONENT R 8504R: NORMAL
MORPHOLOGY BLD-IMP: NORMAL
PLATELET # BLD AUTO: 169 K/UL (ref 164–446)
PRODUCT TYPE UPROD: NORMAL
UNIT STATUS USTAT: NORMAL

## 2022-01-25 PROCEDURE — 36430 TRANSFUSION BLD/BLD COMPNT: CPT

## 2022-01-25 PROCEDURE — 700105 HCHG RX REV CODE 258: Performed by: NURSE PRACTITIONER

## 2022-01-25 PROCEDURE — 86945 BLOOD PRODUCT/IRRADIATION: CPT

## 2022-01-25 PROCEDURE — P9016 RBC LEUKOCYTES REDUCED: HCPCS

## 2022-01-25 PROCEDURE — 306780 HCHG STAT FOR TRANSFUSION PER CASE

## 2022-01-25 PROCEDURE — 700102 HCHG RX REV CODE 250 W/ 637 OVERRIDE(OP): Performed by: NURSE PRACTITIONER

## 2022-01-25 PROCEDURE — 85049 AUTOMATED PLATELET COUNT: CPT

## 2022-01-25 PROCEDURE — 700111 HCHG RX REV CODE 636 W/ 250 OVERRIDE (IP): Performed by: NURSE PRACTITIONER

## 2022-01-25 PROCEDURE — 86922 COMPATIBILITY TEST ANTIGLOB: CPT

## 2022-01-25 PROCEDURE — 96374 THER/PROPH/DIAG INJ IV PUSH: CPT

## 2022-01-25 PROCEDURE — A9270 NON-COVERED ITEM OR SERVICE: HCPCS | Performed by: NURSE PRACTITIONER

## 2022-01-25 PROCEDURE — 36415 COLL VENOUS BLD VENIPUNCTURE: CPT

## 2022-01-25 RX ORDER — 0.9 % SODIUM CHLORIDE 0.9 %
3 VIAL (ML) INJECTION PRN
Status: CANCELLED | OUTPATIENT
Start: 2022-01-25

## 2022-01-25 RX ORDER — ACETAMINOPHEN 325 MG/1
650 TABLET ORAL ONCE
Status: COMPLETED | OUTPATIENT
Start: 2022-01-25 | End: 2022-01-25

## 2022-01-25 RX ORDER — DIPHENHYDRAMINE HCL 25 MG
25 TABLET ORAL ONCE
Status: DISCONTINUED | OUTPATIENT
Start: 2022-01-25 | End: 2022-01-25 | Stop reason: HOSPADM

## 2022-01-25 RX ORDER — SODIUM CHLORIDE 9 MG/ML
INJECTION, SOLUTION INTRAVENOUS CONTINUOUS
Status: CANCELLED | OUTPATIENT
Start: 2022-01-25

## 2022-01-25 RX ORDER — ACETAMINOPHEN 325 MG/1
650 TABLET ORAL ONCE
Status: CANCELLED | OUTPATIENT
Start: 2022-01-25

## 2022-01-25 RX ORDER — 0.9 % SODIUM CHLORIDE 0.9 %
10 VIAL (ML) INJECTION PRN
Status: CANCELLED | OUTPATIENT
Start: 2022-01-25

## 2022-01-25 RX ORDER — HEPARIN SODIUM (PORCINE) LOCK FLUSH IV SOLN 100 UNIT/ML 100 UNIT/ML
500 SOLUTION INTRAVENOUS PRN
Status: CANCELLED | OUTPATIENT
Start: 2022-01-25

## 2022-01-25 RX ORDER — ACETAMINOPHEN 325 MG/1
650 TABLET ORAL PRN
Status: CANCELLED | OUTPATIENT
Start: 2022-01-25

## 2022-01-25 RX ORDER — DIPHENHYDRAMINE HYDROCHLORIDE 50 MG/ML
25 INJECTION INTRAMUSCULAR; INTRAVENOUS PRN
Status: CANCELLED | OUTPATIENT
Start: 2022-01-25

## 2022-01-25 RX ORDER — DIPHENHYDRAMINE HCL 25 MG
25 TABLET ORAL ONCE
Status: CANCELLED | OUTPATIENT
Start: 2022-01-25 | End: 2022-01-25

## 2022-01-25 RX ORDER — 0.9 % SODIUM CHLORIDE 0.9 %
VIAL (ML) INJECTION PRN
Status: CANCELLED | OUTPATIENT
Start: 2022-01-25

## 2022-01-25 RX ADMIN — DIPHENHYDRAMINE HYDROCHLORIDE 25 MG: 50 INJECTION, SOLUTION INTRAMUSCULAR; INTRAVENOUS at 08:41

## 2022-01-25 RX ADMIN — ACETAMINOPHEN 650 MG: 325 TABLET ORAL at 08:41

## 2022-01-25 ASSESSMENT — PAIN DESCRIPTION - PAIN TYPE: TYPE: CHRONIC PAIN

## 2022-01-25 NOTE — PROGRESS NOTES
Pt presents to [facility] for blood products. Established PIV in L foream; brisk blood return observed and pt tolerated well. Labs previously drawn and within treatable parameters. Platelet count redrawn per order. Pre meds administered and 1 unit of RBCs infused with no s/s of adverse reactions. PIV flushed and brisk blood return observed before removing; gauze/coband dressing applied. Next appointment confirmed and education provided. Pt discharged to self care with all personal belongings and in NAD.

## 2022-02-06 ENCOUNTER — OUTPATIENT INFUSION SERVICES (OUTPATIENT)
Dept: ONCOLOGY | Facility: MEDICAL CENTER | Age: 27
End: 2022-02-06
Attending: INTERNAL MEDICINE
Payer: COMMERCIAL

## 2022-02-06 VITALS
RESPIRATION RATE: 18 BRPM | OXYGEN SATURATION: 100 % | SYSTOLIC BLOOD PRESSURE: 144 MMHG | HEART RATE: 79 BPM | DIASTOLIC BLOOD PRESSURE: 93 MMHG | TEMPERATURE: 97.4 F

## 2022-02-06 DIAGNOSIS — D64.9 SYMPTOMATIC ANEMIA: ICD-10-CM

## 2022-02-06 LAB
ABO GROUP BLD: NORMAL
ANISOCYTOSIS BLD QL SMEAR: ABNORMAL
BASOPHILS # BLD AUTO: 0 % (ref 0–1.8)
BASOPHILS # BLD: 0 K/UL (ref 0–0.12)
BLD GP AB SCN SERPL QL: NORMAL
EOSINOPHIL # BLD AUTO: 0 K/UL (ref 0–0.51)
EOSINOPHIL NFR BLD: 0 % (ref 0–6.9)
ERYTHROCYTE [DISTWIDTH] IN BLOOD BY AUTOMATED COUNT: 61.4 FL (ref 35.9–50)
HCT VFR BLD AUTO: 29 % (ref 37–47)
HGB BLD-MCNC: 8.6 G/DL (ref 12–16)
HYPOCHROMIA BLD QL SMEAR: ABNORMAL
LYMPHOCYTES # BLD AUTO: 0.6 K/UL (ref 1–4.8)
LYMPHOCYTES NFR BLD: 9.7 % (ref 22–41)
MACROCYTES BLD QL SMEAR: ABNORMAL
MANUAL DIFF BLD: NORMAL
MCH RBC QN AUTO: 26.8 PG (ref 27–33)
MCHC RBC AUTO-ENTMCNC: 29.7 G/DL (ref 33.6–35)
MCV RBC AUTO: 90.3 FL (ref 81.4–97.8)
MICROCYTES BLD QL SMEAR: ABNORMAL
MONOCYTES # BLD AUTO: 0.22 K/UL (ref 0–0.85)
MONOCYTES NFR BLD AUTO: 3.5 % (ref 0–13.4)
MORPHOLOGY BLD-IMP: NORMAL
NEUTROPHILS # BLD AUTO: 5.38 K/UL (ref 2–7.15)
NEUTROPHILS NFR BLD: 85.9 % (ref 44–72)
NEUTS BAND NFR BLD MANUAL: 0.9 % (ref 0–10)
NRBC # BLD AUTO: 0 K/UL
NRBC BLD-RTO: 0 /100 WBC
PLATELET # BLD AUTO: 218 K/UL (ref 164–446)
PLATELET BLD QL SMEAR: NORMAL
PMV BLD AUTO: 10.8 FL (ref 9–12.9)
RBC # BLD AUTO: 3.21 M/UL (ref 4.2–5.4)
RBC BLD AUTO: PRESENT
RH BLD: NORMAL
WBC # BLD AUTO: 6.2 K/UL (ref 4.8–10.8)

## 2022-02-06 PROCEDURE — 86901 BLOOD TYPING SEROLOGIC RH(D): CPT

## 2022-02-06 PROCEDURE — 86900 BLOOD TYPING SEROLOGIC ABO: CPT

## 2022-02-06 PROCEDURE — 85027 COMPLETE CBC AUTOMATED: CPT

## 2022-02-06 PROCEDURE — 36415 COLL VENOUS BLD VENIPUNCTURE: CPT

## 2022-02-06 PROCEDURE — 86850 RBC ANTIBODY SCREEN: CPT

## 2022-02-06 PROCEDURE — 85007 BL SMEAR W/DIFF WBC COUNT: CPT

## 2022-02-06 RX ORDER — 0.9 % SODIUM CHLORIDE 0.9 %
3 VIAL (ML) INJECTION PRN
Status: CANCELLED | OUTPATIENT
Start: 2022-02-06

## 2022-02-06 RX ORDER — 0.9 % SODIUM CHLORIDE 0.9 %
10 VIAL (ML) INJECTION PRN
Status: CANCELLED | OUTPATIENT
Start: 2022-02-06

## 2022-02-06 RX ORDER — HEPARIN SODIUM (PORCINE) LOCK FLUSH IV SOLN 100 UNIT/ML 100 UNIT/ML
500 SOLUTION INTRAVENOUS PRN
Status: CANCELLED | OUTPATIENT
Start: 2022-02-06

## 2022-02-06 RX ORDER — DIPHENHYDRAMINE HCL 25 MG
25 TABLET ORAL ONCE
Status: CANCELLED | OUTPATIENT
Start: 2022-02-06 | End: 2022-02-06

## 2022-02-06 RX ORDER — DIPHENHYDRAMINE HYDROCHLORIDE 50 MG/ML
25 INJECTION INTRAMUSCULAR; INTRAVENOUS PRN
Status: CANCELLED | OUTPATIENT
Start: 2022-02-06

## 2022-02-06 RX ORDER — ACETAMINOPHEN 325 MG/1
650 TABLET ORAL PRN
Status: CANCELLED | OUTPATIENT
Start: 2022-02-06

## 2022-02-06 RX ORDER — 0.9 % SODIUM CHLORIDE 0.9 %
VIAL (ML) INJECTION PRN
Status: CANCELLED | OUTPATIENT
Start: 2022-02-06

## 2022-02-06 RX ORDER — ACETAMINOPHEN 325 MG/1
650 TABLET ORAL ONCE
Status: CANCELLED | OUTPATIENT
Start: 2022-02-06

## 2022-02-06 RX ORDER — SODIUM CHLORIDE 9 MG/ML
INJECTION, SOLUTION INTRAVENOUS CONTINUOUS
Status: CANCELLED | OUTPATIENT
Start: 2022-02-06

## 2022-02-06 NOTE — PROGRESS NOTES
Pt arrived ambulatory for labs - COD/CBC.  POC discussed.  Labs drawn from LAC without complication.  Sent to lab for processing.  Pt returns Tuesday for potential blood products, appointment confirmed.  Pt discharged from IS in NAD under self care.

## 2022-02-08 ENCOUNTER — APPOINTMENT (OUTPATIENT)
Dept: ONCOLOGY | Facility: MEDICAL CENTER | Age: 27
End: 2022-02-08
Attending: INTERNAL MEDICINE
Payer: COMMERCIAL

## 2022-02-20 ENCOUNTER — OUTPATIENT INFUSION SERVICES (OUTPATIENT)
Dept: ONCOLOGY | Facility: MEDICAL CENTER | Age: 27
End: 2022-02-20
Attending: INTERNAL MEDICINE
Payer: COMMERCIAL

## 2022-02-20 DIAGNOSIS — D64.9 SYMPTOMATIC ANEMIA: ICD-10-CM

## 2022-02-20 LAB
ABO GROUP BLD: NORMAL
ANISOCYTOSIS BLD QL SMEAR: ABNORMAL
BASOPHILS # BLD AUTO: 0.9 % (ref 0–1.8)
BASOPHILS # BLD: 0.04 K/UL (ref 0–0.12)
BLD GP AB SCN SERPL QL: NORMAL
EOSINOPHIL # BLD AUTO: 0.12 K/UL (ref 0–0.51)
EOSINOPHIL NFR BLD: 2.6 % (ref 0–6.9)
ERYTHROCYTE [DISTWIDTH] IN BLOOD BY AUTOMATED COUNT: 61.5 FL (ref 35.9–50)
HCT VFR BLD AUTO: 22.7 % (ref 37–47)
HGB BLD-MCNC: 6.6 G/DL (ref 12–16)
HYPOCHROMIA BLD QL SMEAR: ABNORMAL
LYMPHOCYTES # BLD AUTO: 0.53 K/UL (ref 1–4.8)
LYMPHOCYTES NFR BLD: 11.2 % (ref 22–41)
MANUAL DIFF BLD: NORMAL
MCH RBC QN AUTO: 26.3 PG (ref 27–33)
MCHC RBC AUTO-ENTMCNC: 29.1 G/DL (ref 33.6–35)
MCV RBC AUTO: 90.4 FL (ref 81.4–97.8)
MONOCYTES # BLD AUTO: 0.08 K/UL (ref 0–0.85)
MONOCYTES NFR BLD AUTO: 1.7 % (ref 0–13.4)
MORPHOLOGY BLD-IMP: NORMAL
NEUTROPHILS # BLD AUTO: 3.93 K/UL (ref 2–7.15)
NEUTROPHILS NFR BLD: 83.6 % (ref 44–72)
NRBC # BLD AUTO: 0 K/UL
NRBC BLD-RTO: 0 /100 WBC
OVALOCYTES BLD QL SMEAR: NORMAL
PLATELET # BLD AUTO: 307 K/UL (ref 164–446)
PLATELET BLD QL SMEAR: NORMAL
PMV BLD AUTO: 9.4 FL (ref 9–12.9)
POIKILOCYTOSIS BLD QL SMEAR: NORMAL
RBC # BLD AUTO: 2.51 M/UL (ref 4.2–5.4)
RBC BLD AUTO: PRESENT
RH BLD: NORMAL
WBC # BLD AUTO: 4.7 K/UL (ref 4.8–10.8)

## 2022-02-20 PROCEDURE — 36415 COLL VENOUS BLD VENIPUNCTURE: CPT

## 2022-02-20 PROCEDURE — 85027 COMPLETE CBC AUTOMATED: CPT

## 2022-02-20 PROCEDURE — 86901 BLOOD TYPING SEROLOGIC RH(D): CPT

## 2022-02-20 PROCEDURE — 85007 BL SMEAR W/DIFF WBC COUNT: CPT

## 2022-02-20 PROCEDURE — 86902 BLOOD TYPE ANTIGEN DONOR EA: CPT | Mod: 91

## 2022-02-20 PROCEDURE — 86850 RBC ANTIBODY SCREEN: CPT

## 2022-02-20 PROCEDURE — 86900 BLOOD TYPING SEROLOGIC ABO: CPT

## 2022-02-20 RX ORDER — 0.9 % SODIUM CHLORIDE 0.9 %
3 VIAL (ML) INJECTION PRN
Status: CANCELLED | OUTPATIENT
Start: 2022-02-20

## 2022-02-20 RX ORDER — SODIUM CHLORIDE 9 MG/ML
INJECTION, SOLUTION INTRAVENOUS CONTINUOUS
Status: CANCELLED | OUTPATIENT
Start: 2022-02-20

## 2022-02-20 RX ORDER — 0.9 % SODIUM CHLORIDE 0.9 %
10 VIAL (ML) INJECTION PRN
Status: CANCELLED | OUTPATIENT
Start: 2022-02-20

## 2022-02-20 RX ORDER — ACETAMINOPHEN 325 MG/1
650 TABLET ORAL PRN
Status: CANCELLED | OUTPATIENT
Start: 2022-02-20

## 2022-02-20 RX ORDER — DIPHENHYDRAMINE HYDROCHLORIDE 50 MG/ML
25 INJECTION INTRAMUSCULAR; INTRAVENOUS PRN
Status: CANCELLED | OUTPATIENT
Start: 2022-02-20

## 2022-02-20 RX ORDER — ACETAMINOPHEN 325 MG/1
650 TABLET ORAL ONCE
Status: CANCELLED | OUTPATIENT
Start: 2022-02-20

## 2022-02-20 RX ORDER — 0.9 % SODIUM CHLORIDE 0.9 %
VIAL (ML) INJECTION PRN
Status: CANCELLED | OUTPATIENT
Start: 2022-02-20

## 2022-02-20 RX ORDER — HEPARIN SODIUM (PORCINE) LOCK FLUSH IV SOLN 100 UNIT/ML 100 UNIT/ML
500 SOLUTION INTRAVENOUS PRN
Status: CANCELLED | OUTPATIENT
Start: 2022-02-20

## 2022-02-20 RX ORDER — DIPHENHYDRAMINE HCL 25 MG
25 TABLET ORAL ONCE
Status: CANCELLED | OUTPATIENT
Start: 2022-02-20 | End: 2022-02-20

## 2022-02-20 NOTE — PROGRESS NOTES
Pt ambulatory to Infusion for CBC/COD for poss transfusion on Tuesday.  Pt w/ no s/s of infection, pt has no complaints at this time.  Labs drawn from LAC w/ 25G butterfly, gauze and coban dressing applied.  Pt left on foot in care of self in NAD.  Pt has appt for Tuesday, will call pt w/ lab results.    Pt's Hgb of 6.6 meets parameters for 2 units PRBCs.  Pt's COD was negative for antibodies, pt requesting to come in tomorrow for her transfusion, as she is having surgery on Tuesday.  Pt called w/ lab results and appt time for Monday at 1000.

## 2022-02-21 ENCOUNTER — OUTPATIENT INFUSION SERVICES (OUTPATIENT)
Dept: ONCOLOGY | Facility: MEDICAL CENTER | Age: 27
End: 2022-02-21
Attending: INTERNAL MEDICINE
Payer: COMMERCIAL

## 2022-02-21 VITALS
RESPIRATION RATE: 18 BRPM | SYSTOLIC BLOOD PRESSURE: 159 MMHG | DIASTOLIC BLOOD PRESSURE: 111 MMHG | WEIGHT: 112.66 LBS | TEMPERATURE: 98.2 F | HEART RATE: 90 BPM | HEIGHT: 66 IN | BODY MASS INDEX: 18.11 KG/M2 | OXYGEN SATURATION: 100 %

## 2022-02-21 DIAGNOSIS — D64.9 SYMPTOMATIC ANEMIA: ICD-10-CM

## 2022-02-21 PROCEDURE — 700105 HCHG RX REV CODE 258: Performed by: NURSE PRACTITIONER

## 2022-02-21 PROCEDURE — 86922 COMPATIBILITY TEST ANTIGLOB: CPT

## 2022-02-21 PROCEDURE — 306780 HCHG STAT FOR TRANSFUSION PER CASE

## 2022-02-21 PROCEDURE — 96376 TX/PRO/DX INJ SAME DRUG ADON: CPT

## 2022-02-21 PROCEDURE — 700111 HCHG RX REV CODE 636 W/ 250 OVERRIDE (IP): Performed by: NURSE PRACTITIONER

## 2022-02-21 PROCEDURE — 700111 HCHG RX REV CODE 636 W/ 250 OVERRIDE (IP): Performed by: INTERNAL MEDICINE

## 2022-02-21 PROCEDURE — 36430 TRANSFUSION BLD/BLD COMPNT: CPT

## 2022-02-21 PROCEDURE — 96374 THER/PROPH/DIAG INJ IV PUSH: CPT

## 2022-02-21 PROCEDURE — 86945 BLOOD PRODUCT/IRRADIATION: CPT | Mod: 91

## 2022-02-21 PROCEDURE — P9016 RBC LEUKOCYTES REDUCED: HCPCS | Mod: 91

## 2022-02-21 RX ORDER — DIPHENHYDRAMINE HCL 25 MG
25 TABLET ORAL ONCE
Status: CANCELLED | OUTPATIENT
Start: 2022-02-21 | End: 2022-02-21

## 2022-02-21 RX ORDER — ACETAMINOPHEN 325 MG/1
650 TABLET ORAL ONCE
Status: CANCELLED | OUTPATIENT
Start: 2022-02-21

## 2022-02-21 RX ORDER — 0.9 % SODIUM CHLORIDE 0.9 %
10 VIAL (ML) INJECTION PRN
Status: CANCELLED | OUTPATIENT
Start: 2022-02-21

## 2022-02-21 RX ORDER — HEPARIN SODIUM (PORCINE) LOCK FLUSH IV SOLN 100 UNIT/ML 100 UNIT/ML
500 SOLUTION INTRAVENOUS PRN
Status: CANCELLED | OUTPATIENT
Start: 2022-02-21

## 2022-02-21 RX ORDER — SODIUM CHLORIDE 9 MG/ML
INJECTION, SOLUTION INTRAVENOUS CONTINUOUS
Status: CANCELLED | OUTPATIENT
Start: 2022-02-21

## 2022-02-21 RX ORDER — 0.9 % SODIUM CHLORIDE 0.9 %
VIAL (ML) INJECTION PRN
Status: CANCELLED | OUTPATIENT
Start: 2022-02-21

## 2022-02-21 RX ORDER — DIPHENHYDRAMINE HYDROCHLORIDE 50 MG/ML
25 INJECTION INTRAMUSCULAR; INTRAVENOUS PRN
Status: CANCELLED | OUTPATIENT
Start: 2022-02-21

## 2022-02-21 RX ORDER — ACETAMINOPHEN 325 MG/1
650 TABLET ORAL PRN
Status: CANCELLED | OUTPATIENT
Start: 2022-02-21

## 2022-02-21 RX ORDER — DIPHENHYDRAMINE HYDROCHLORIDE 50 MG/ML
25 INJECTION INTRAMUSCULAR; INTRAVENOUS PRN
Status: CANCELLED
Start: 2022-02-21

## 2022-02-21 RX ORDER — DIPHENHYDRAMINE HYDROCHLORIDE 50 MG/ML
25 INJECTION INTRAMUSCULAR; INTRAVENOUS PRN
Status: DISCONTINUED | OUTPATIENT
Start: 2022-02-21 | End: 2022-02-21 | Stop reason: HOSPADM

## 2022-02-21 RX ORDER — SODIUM CHLORIDE 9 MG/ML
INJECTION, SOLUTION INTRAVENOUS CONTINUOUS
Status: DISCONTINUED | OUTPATIENT
Start: 2022-02-21 | End: 2022-02-21 | Stop reason: HOSPADM

## 2022-02-21 RX ORDER — 0.9 % SODIUM CHLORIDE 0.9 %
3 VIAL (ML) INJECTION PRN
Status: CANCELLED | OUTPATIENT
Start: 2022-02-21

## 2022-02-21 RX ADMIN — DIPHENHYDRAMINE HYDROCHLORIDE 25 MG: 50 INJECTION INTRAMUSCULAR; INTRAVENOUS at 13:05

## 2022-02-21 RX ADMIN — DIPHENHYDRAMINE HYDROCHLORIDE 25 MG: 50 INJECTION, SOLUTION INTRAMUSCULAR; INTRAVENOUS at 10:28

## 2022-02-21 ASSESSMENT — FIBROSIS 4 INDEX: FIB4 SCORE: 0.47

## 2022-02-21 NOTE — PROGRESS NOTES
Lily arrives for blood transfusion today. Lily c/o fatigue, but denies worsened shortness of breath and chest pain. CBC done prior shows Hgb 6.6. Contacted Dr. Garcia for pt request to have benadryl between each unit of PRBCs. Blood transfusion consents signed. Potential side effects reviewed. PIV started. 2 units PRBC's transfused without incident, benadryl administered between units per order and pt request. No signs or symptoms of adverse reaction observed or expressed. Lily did become tearful of chronic back pain at the end of the infusion, repositioned and Lily reported pain improved. Next appointment scheduled and Lily aware to go to ER should she experience symptoms of fluid overload prior to tomorrow's dialysis appointment, declined possibility of utilizing diuretics since they do not work per pt. Lily has no further questions at this time, discharged home to care of mother.

## 2022-03-04 ENCOUNTER — HOSPITAL ENCOUNTER (EMERGENCY)
Facility: MEDICAL CENTER | Age: 27
End: 2022-03-04
Attending: EMERGENCY MEDICINE
Payer: COMMERCIAL

## 2022-03-04 ENCOUNTER — APPOINTMENT (OUTPATIENT)
Dept: RADIOLOGY | Facility: MEDICAL CENTER | Age: 27
End: 2022-03-04
Attending: EMERGENCY MEDICINE
Payer: COMMERCIAL

## 2022-03-04 VITALS
SYSTOLIC BLOOD PRESSURE: 156 MMHG | HEIGHT: 66 IN | TEMPERATURE: 98 F | OXYGEN SATURATION: 100 % | HEART RATE: 84 BPM | DIASTOLIC BLOOD PRESSURE: 93 MMHG | BODY MASS INDEX: 18.92 KG/M2 | RESPIRATION RATE: 16 BRPM | WEIGHT: 117.73 LBS

## 2022-03-04 DIAGNOSIS — R07.9 CHEST PAIN, UNSPECIFIED TYPE: ICD-10-CM

## 2022-03-04 LAB
ALBUMIN SERPL BCP-MCNC: 3 G/DL (ref 3.2–4.9)
ALBUMIN/GLOB SERPL: 0.5 G/DL
ALP SERPL-CCNC: 81 U/L (ref 30–99)
ALT SERPL-CCNC: 6 U/L (ref 2–50)
ANION GAP SERPL CALC-SCNC: 12 MMOL/L (ref 7–16)
AST SERPL-CCNC: 19 U/L (ref 12–45)
BASOPHILS # BLD AUTO: 0.4 % (ref 0–1.8)
BASOPHILS # BLD: 0.02 K/UL (ref 0–0.12)
BILIRUB SERPL-MCNC: 0.4 MG/DL (ref 0.1–1.5)
BUN SERPL-MCNC: 33 MG/DL (ref 8–22)
CALCIUM SERPL-MCNC: 8.8 MG/DL (ref 8.4–10.2)
CHLORIDE SERPL-SCNC: 88 MMOL/L (ref 96–112)
CO2 SERPL-SCNC: 32 MMOL/L (ref 20–33)
COMMENT 1642: NORMAL
CREAT SERPL-MCNC: 5.19 MG/DL (ref 0.5–1.4)
EKG IMPRESSION: NORMAL
EOSINOPHIL # BLD AUTO: 0.1 K/UL (ref 0–0.51)
EOSINOPHIL NFR BLD: 2 % (ref 0–6.9)
ERYTHROCYTE [DISTWIDTH] IN BLOOD BY AUTOMATED COUNT: 59.2 FL (ref 35.9–50)
GLOBULIN SER CALC-MCNC: 6.2 G/DL (ref 1.9–3.5)
GLUCOSE SERPL-MCNC: 84 MG/DL (ref 65–99)
HCG SERPL QL: NEGATIVE
HCT VFR BLD AUTO: 31.5 % (ref 37–47)
HGB BLD-MCNC: 9.3 G/DL (ref 12–16)
IMM GRANULOCYTES # BLD AUTO: 0.02 K/UL (ref 0–0.11)
IMM GRANULOCYTES NFR BLD AUTO: 0.4 % (ref 0–0.9)
LIPASE SERPL-CCNC: 25 U/L (ref 7–58)
LYMPHOCYTES # BLD AUTO: 0.71 K/UL (ref 1–4.8)
LYMPHOCYTES NFR BLD: 14.4 % (ref 22–41)
MCH RBC QN AUTO: 26.6 PG (ref 27–33)
MCHC RBC AUTO-ENTMCNC: 29.5 G/DL (ref 33.6–35)
MCV RBC AUTO: 90 FL (ref 81.4–97.8)
MONOCYTES # BLD AUTO: 0.17 K/UL (ref 0–0.85)
MONOCYTES NFR BLD AUTO: 3.5 % (ref 0–13.4)
NEUTROPHILS # BLD AUTO: 3.9 K/UL (ref 2–7.15)
NEUTROPHILS NFR BLD: 79.3 % (ref 44–72)
NRBC # BLD AUTO: 0 K/UL
NRBC BLD-RTO: 0 /100 WBC
PLATELET # BLD AUTO: 277 K/UL (ref 164–446)
PLATELET BLD QL SMEAR: NORMAL
PMV BLD AUTO: 10.1 FL (ref 9–12.9)
POTASSIUM SERPL-SCNC: 5.1 MMOL/L (ref 3.6–5.5)
PROT SERPL-MCNC: 9.2 G/DL (ref 6–8.2)
RBC # BLD AUTO: 3.5 M/UL (ref 4.2–5.4)
RBC BLD AUTO: NORMAL
SODIUM SERPL-SCNC: 132 MMOL/L (ref 135–145)
TROPONIN T SERPL-MCNC: 624 NG/L (ref 6–19)
WBC # BLD AUTO: 4.9 K/UL (ref 4.8–10.8)

## 2022-03-04 PROCEDURE — 84484 ASSAY OF TROPONIN QUANT: CPT

## 2022-03-04 PROCEDURE — 96375 TX/PRO/DX INJ NEW DRUG ADDON: CPT

## 2022-03-04 PROCEDURE — 36415 COLL VENOUS BLD VENIPUNCTURE: CPT

## 2022-03-04 PROCEDURE — 83690 ASSAY OF LIPASE: CPT

## 2022-03-04 PROCEDURE — 84703 CHORIONIC GONADOTROPIN ASSAY: CPT

## 2022-03-04 PROCEDURE — 71045 X-RAY EXAM CHEST 1 VIEW: CPT

## 2022-03-04 PROCEDURE — 93005 ELECTROCARDIOGRAM TRACING: CPT | Performed by: EMERGENCY MEDICINE

## 2022-03-04 PROCEDURE — 93005 ELECTROCARDIOGRAM TRACING: CPT

## 2022-03-04 PROCEDURE — 80053 COMPREHEN METABOLIC PANEL: CPT

## 2022-03-04 PROCEDURE — 99284 EMERGENCY DEPT VISIT MOD MDM: CPT

## 2022-03-04 PROCEDURE — 85025 COMPLETE CBC W/AUTO DIFF WBC: CPT

## 2022-03-04 PROCEDURE — 700111 HCHG RX REV CODE 636 W/ 250 OVERRIDE (IP): Performed by: EMERGENCY MEDICINE

## 2022-03-04 PROCEDURE — 96374 THER/PROPH/DIAG INJ IV PUSH: CPT

## 2022-03-04 RX ORDER — MORPHINE SULFATE 4 MG/ML
4 INJECTION INTRAVENOUS ONCE
Status: COMPLETED | OUTPATIENT
Start: 2022-03-04 | End: 2022-03-04

## 2022-03-04 RX ORDER — ONDANSETRON 2 MG/ML
4 INJECTION INTRAMUSCULAR; INTRAVENOUS ONCE
Status: COMPLETED | OUTPATIENT
Start: 2022-03-04 | End: 2022-03-04

## 2022-03-04 RX ADMIN — ONDANSETRON 4 MG: 2 INJECTION INTRAMUSCULAR; INTRAVENOUS at 05:15

## 2022-03-04 RX ADMIN — MORPHINE SULFATE 4 MG: 4 INJECTION INTRAVENOUS at 04:38

## 2022-03-04 ASSESSMENT — FIBROSIS 4 INDEX: FIB4 SCORE: 0.47

## 2022-03-04 NOTE — ED NOTES
Pt arrives to ED c/o right sided CP & Abd pain for the last week reports today the pain woke her up approx 2am -n/-v/-sob pt reports she has has pain like this before but no know cause. Pt is a/ox4 ambulatory pt is compliant with HD shunt to right side. Call light w/in reach sister at bedside

## 2022-03-04 NOTE — ED NOTES
Pt is resting in bed no s/s of acute distress assuming position of comfort breathing Is even & unlabored. Safety precautions in place MD updated on POC. Sister remains at bedside

## 2022-03-04 NOTE — ED PROVIDER NOTES
"ED Provider Note      Means of Arrival: Private Vehicle  History obtained from: Patient, medical record    CHIEF COMPLAINT  Chief Complaint   Patient presents with   • Chest Pain   • Abdominal Pain     x1wk       HPI  Lily Nicole is a 26 y.o. female with past medical history of lupus, end-stage renal disease on hemodialysis, prior GI bleed, GERD, chronic hypoxemia on 2 to 3 L, who presents with chest pain, abdominal pain.  The patient reports that she been having these symptoms over the past week with a markedly worsened around 2 in the morning.  She reports that the pain is worse lying flat.  The chest pain is sharp, right-sided, worse with laying flat.  There are no aggravating factors.  It does not radiate.  She also feels discomfort in her epigastrium.  She reports that this feels similar to pain that she has had in the past.  Her goal today is to ensure that there is nothing dangerous going on.  She has dialysis at 6 AM.  She denies any cough, hemoptysis, leg swelling, calf tenderness, melena, hematochezia    REVIEW OF SYSTEMS  CONSTITUTIONAL:  No fever.  CARDIOVASCULAR:  See HPI  RESPIRATORY:  See HPI  GASTROINTESTINAL:   See HPI  MUSCULOSKELETAL:  No arthralgia.  NEUROLOGIC:   No headache.    See HPI for further details.   All other systems are negative.     PAST MEDICAL HISTORY  Past Medical History:   Diagnosis Date   • Anemia 01/17/2018   • AVF (arteriovenous fistula) ([facility])     Right Arm   • Chest pain    • Chest tightness    • Daytime sleepiness    • Dialysis patient ([facility])      dialysis, M,W,F Elizabeth/Joni   • Difficulty breathing    • ESRD (end stage renal disease) on dialysis ([facility]) 01/17/2018    Twan Fu   • Gasping for breath    • Heart burn    • Hypertension 01/17/2018    \"Controlled with medication\"   • Indigestion    • Lupus ([facility])    • Migraines 01/17/2018   • Painful breathing    • Palpitations    • Seizure ([facility]) 2013    from high blood pressure, reports 1 time event   • Shortness of breath "    • Swelling of lower extremity    • Wheezing        FAMILY HISTORY  Family History   Problem Relation Age of Onset   • Diabetes Paternal Grandmother        SOCIAL HISTORY   reports that she has never smoked. She has never used smokeless tobacco. She reports that she does not drink alcohol and does not use drugs.    SURGICAL HISTORY  Past Surgical History:   Procedure Laterality Date   • DE BRONCHOSCOPY,DIAGNOSTIC Bilateral 5/13/2021    Procedure: BRONCHOSCOPY, BRONCHOALVEOLAR LAVAGE;  Surgeon: William Spangler M.D.;  [place] [facility] [place]  Service: [place]monary   • DE UPPER GI ENDOSCOPY,DIAGNOSIS N/A 3/19/2021    Procedure: GASTROSCOPY;  Surgeon: Waylon Mcqueen M.D.;  [place] [facility] [place]  Service: Gastroenterology   • DE UPPER GI ENDOSCOPY,CTRL BLEED  3/19/2021    Procedure: GASTROSCOPY, WITH ARGON PLASMA COAGULATION;  Surgeon: Waylon Mcqueen M.D.;  [place] [facility] [place]  Service: Gastroenterology   • DE UPPER GI ENDOSCOPY,DIAGNOSIS  3/5/2021    Procedure: GASTROSCOPY - W/HEMOSTASIS;  Surgeon: Ej Silva M.D.;  [place] [facility] [place]  Service: Gastroenterology   • DE COLONOSCOPY,DIAGNOSTIC  1/8/2021    Procedure: COLONOSCOPY;  Surgeon: Herbert Contreras M.D.;  [place] [facility] [place]  Service: Gastroenterology   • DE UPPER GI ENDOSCOPY,DIAGNOSIS  1/8/2021    Procedure: GASTROSCOPY;  Surgeon: Herbert Contreras M.D.;  [place] [facility] [place]  Service: Gastroenterology   • DE UPPER GI ENDOSCOPY,CTRL BLEED  1/8/2021    Procedure: GASTROSCOPY, WITH ARGON PLASMA COAGULATION;  Surgeon: Herbert Contreras M.D.;  [place] [facility] [place]  Service: Gastroenterology   • DE UPPER GI ENDOSCOPY,CTRL BLEED  11/12/2020    Procedure: GASTROSCOPY, WITH ARGON PLASMA COAGULATION;  Surgeon: Gadiel Whitney M.D.;  [place] [facility] [place]  Service: Gastroenterology   • DE UPPER GI ENDOSCOPY,BIOPSY  11/12/2020    Procedure: GASTROSCOPY, WITH BIOPSY;  Surgeon: Gadiel JASON  "TERRI Whitney;  Location: SURGERY [facility];  Service: Gastroenterology   • GASTROSCOPY-ENDO  11/12/2020    Procedure: GASTROSCOPY;  Surgeon: Gadiel Whitney M.D.;  Location: SURGERY [facility];  Service: Gastroenterology   • GASTROSCOPY-ENDO  9/18/2020    Procedure: GASTROSCOPY;  Surgeon: Gadiel Whitney M.D.;  Location: SURGERY [facility];  Service: Gastroenterology   • GASTROSCOPY N/A 5/30/2020    Procedure: GASTROSCOPY;  Surgeon: Waylon Mcqueen M.D.;  Location: SURGERY [facility];  Service: Gastroenterology   • GASTROSCOPY-ENDO  12/9/2019    Procedure: GASTROSCOPY;  Surgeon: Aaron Kam M.D.;  Location: [facility];  Service: Gastroenterology   • ANGIOPLASTY  01/17/2018    \"Right Arm AV-Fistulagram & Angioplastyx3\"   • ULI BY LAPAROSCOPY  4/5/2010    Performed by SYED MARTELL at SURGERY [facility]   • AV FISTULA CREATION Right    • OTHER      renal biopsy x 3   • OTHER      bone marrow biopsy       CURRENT MEDICATIONS  Home Medications    **Home medications have not yet been reviewed for this encounter**         ALLERGIES  Allergies   Allergen Reactions   • Cephalexin Rash     Nausea and rash   Hives  .   • Clindamycin Rash     Nausea and rash     Hive  .   • Methylprednisolone      Anxious  Other reaction(s): Other-Reaction in Comments  Anxious   • Metoprolol Rash and Nausea     Nausea and rash     .   • Compazine      C/o anxiety   • Fentanyl And Related Anxiety   • Hydrocodone-Acetaminophen Rash and Nausea     Generalized rash  Generalized rash   • Maxipime [Cefepime] Itching   • Reglan [Metoclopramide] Anxiety   • Tape Rash     Paper tape is ok       PHYSICAL EXAM  VITAL SIGNS: /93   Pulse 84   Temp 36.7 °C (98 °F) (Temporal)   Resp 16   Ht 1.67 m (5' 5.75\")   Wt 53.4 kg (117 lb 11.6 oz)   LMP 02/18/2022   SpO2 100%   BMI 19.15 kg/m²    Gen: Alert  HENT: ATNC  Eyes: Normal conjunctiva  Neck: trachea midline  Resp: no respiratory distress, faint " bibasilar crackles, wheezes or rhonchi  CV: No JVD, RRR, continuous crescendo decrescendo murmur right sternal border.  No r/g.  Right upper extremity fistula with palpable thrill  Abd: non-distended, non-tender  Ext: No deformities, no edema  Psych: normal mood  Neuro: speech fluent       RADIOLOGY/PROCEDURES  DX-CHEST-PORTABLE (1 VIEW)   Final Result         1. No acute cardiopulmonary abnormalities are identified.          LABS  Labs Reviewed   CBC WITH DIFFERENTIAL - Abnormal; Notable for the following components:       Result Value    RBC 3.50 (*)     Hemoglobin 9.3 (*)     Hematocrit 31.5 (*)     MCH 26.6 (*)     MCHC 29.5 (*)     RDW 59.2 (*)     Neutrophils-Polys 79.30 (*)     Lymphocytes 14.40 (*)     Lymphs (Absolute) 0.71 (*)     All other components within normal limits   COMP METABOLIC PANEL - Abnormal; Notable for the following components:    Sodium 132 (*)     Chloride 88 (*)     Bun 33 (*)     Creatinine 5.19 (*)     Albumin 3.0 (*)     Total Protein 9.2 (*)     Globulin 6.2 (*)     All other components within normal limits   TROPONIN - Abnormal; Notable for the following components:    Troponin T 624 (*)     All other components within normal limits   ESTIMATED GFR - Abnormal; Notable for the following components:    GFR If  12 (*)     GFR If Non  10 (*)     All other components within normal limits   LIPASE   HCG QUAL SERUM   PLATELET ESTIMATE   MORPHOLOGY   DIFFERENTIAL COMMENT        EKG  Results for orders placed or performed during the hospital encounter of 22   EKG   Result Value Ref Range    Report       [facility] Emergency Dept.    Test Date:  2022  Pt Name:    CASE WOODSON            Department: [facility]  MRN:        [id number]                      Room:  Gender:     Female                       Technician: JORGE LUIS  :        1995                   Requested By:ER TRIAGE PROTOCOL  Order #:    [id number]                     Reading MD: Edward Leon    Measurements  Intervals                                Axis  Rate:       94                           P:          72  SD:         165                          QRS:        -17  QRSD:       84                           T:          106  QT:         360  QTc:        451    Interpretive Statements  Sinus rhythm  LVH with secondary repolarization abnormality  Compared to ECG 01/19/2022 14:04:02  Sinus tachycardia no longer present  Electronically Signed On 3-4-2022 4:31:01 PST by Edward Leon          COURSE & MEDICAL DECISION MAKING  Pertinent Labs & Imaging studies reviewed. (See chart for details)    Review of the medical record demonstrates the patient has a history of end-stage renal disease on dialysis secondary to SLE.  She was most recently seen in the emergency department January 19.  She received a blood transfusion on 2/21 for 2 units for hemoglobin of 6.6.  Patient has baseline troponins generally in the 600s range.     Patient presents with symptoms similar to her prior presentations.  She denies any melena to suggest active GI bleed.  Will obtain labs, chest x-ray.  EKG consistent with prior EKGs.  Bedside ultrasound performed demonstrates no significant pericardial effusion.    No stigmata of DVT/PE.  Patient's presentation not consistent with aortic dissection.  Patient's troponin is at her baseline.  No hyperkalemia.  Patient feeling improved.    The patient was given return precautions, anticipatory guidance, and the opportunity to ask questions prior to discharge.     Appropriate PPE were worn at this encounter.     FINAL IMPRESSION  1. Chest pain, unspecified type           DISPOSITION:  Patient will be discharged home in stable condition.    FOLLOW UP:  Ella Matthew M.D.  [address] [facility] [address]  [phone]    Schedule an appointment as soon as possible for a visit       [facility], Emergency Dept  [address] [facility] [place]  [address]  [phone]    If symptoms worsen      This dictation was created using voice recognition software. The accuracy of the dictation is limited to the abilities of the software. I expect there may be some errors of grammar and possibly content. The nursing notes were reviewed and certain aspects of this information were incorporated into this note.

## 2022-03-04 NOTE — ED NOTES
PT cleared for DC home pt and sister verbalized understanding and have no further questions pt is in good spirits no distress noted pt advised to f/u with pcp   Pt to return to ED for any worsening symptoms. Pt in stable condition IV removed gauze placed and secured with paper tape. Pt ambulated out of ED along side sister w/steady gait

## 2022-03-06 ENCOUNTER — HOSPITAL ENCOUNTER (EMERGENCY)
Facility: MEDICAL CENTER | Age: 27
End: 2022-03-06
Attending: EMERGENCY MEDICINE
Payer: COMMERCIAL

## 2022-03-06 ENCOUNTER — APPOINTMENT (OUTPATIENT)
Dept: RADIOLOGY | Facility: MEDICAL CENTER | Age: 27
End: 2022-03-06
Attending: EMERGENCY MEDICINE
Payer: COMMERCIAL

## 2022-03-06 VITALS
WEIGHT: 116.84 LBS | TEMPERATURE: 98 F | BODY MASS INDEX: 19.47 KG/M2 | OXYGEN SATURATION: 100 % | SYSTOLIC BLOOD PRESSURE: 164 MMHG | HEIGHT: 65 IN | RESPIRATION RATE: 16 BRPM | DIASTOLIC BLOOD PRESSURE: 107 MMHG | HEART RATE: 96 BPM

## 2022-03-06 DIAGNOSIS — J18.9 PNEUMONIA OF BOTH LOWER LOBES DUE TO INFECTIOUS ORGANISM: ICD-10-CM

## 2022-03-06 LAB
ALBUMIN SERPL BCP-MCNC: 2.6 G/DL (ref 3.2–4.9)
ALBUMIN/GLOB SERPL: 0.4 G/DL
ALP SERPL-CCNC: 72 U/L (ref 30–99)
ALT SERPL-CCNC: 8 U/L (ref 2–50)
ANION GAP SERPL CALC-SCNC: 12 MMOL/L (ref 7–16)
ANISOCYTOSIS BLD QL SMEAR: ABNORMAL
AST SERPL-CCNC: 21 U/L (ref 12–45)
BASOPHILS # BLD AUTO: 0.4 % (ref 0–1.8)
BASOPHILS # BLD: 0.02 K/UL (ref 0–0.12)
BILIRUB SERPL-MCNC: 0.4 MG/DL (ref 0.1–1.5)
BUN SERPL-MCNC: 41 MG/DL (ref 8–22)
CALCIUM SERPL-MCNC: 8.5 MG/DL (ref 8.4–10.2)
CHLORIDE SERPL-SCNC: 88 MMOL/L (ref 96–112)
CO2 SERPL-SCNC: 29 MMOL/L (ref 20–33)
COMMENT 1642: NORMAL
CREAT SERPL-MCNC: 5.39 MG/DL (ref 0.5–1.4)
EOSINOPHIL # BLD AUTO: 0.06 K/UL (ref 0–0.51)
EOSINOPHIL NFR BLD: 1.3 % (ref 0–6.9)
ERYTHROCYTE [DISTWIDTH] IN BLOOD BY AUTOMATED COUNT: 59.2 FL (ref 35.9–50)
GLOBULIN SER CALC-MCNC: 6.2 G/DL (ref 1.9–3.5)
GLUCOSE SERPL-MCNC: 82 MG/DL (ref 65–99)
HCG SERPL QL: NEGATIVE
HCT VFR BLD AUTO: 30.3 % (ref 37–47)
HGB BLD-MCNC: 9 G/DL (ref 12–16)
IMM GRANULOCYTES # BLD AUTO: 0.02 K/UL (ref 0–0.11)
IMM GRANULOCYTES NFR BLD AUTO: 0.4 % (ref 0–0.9)
LIPASE SERPL-CCNC: 12 U/L (ref 7–58)
LYMPHOCYTES # BLD AUTO: 0.6 K/UL (ref 1–4.8)
LYMPHOCYTES NFR BLD: 13.4 % (ref 22–41)
MCH RBC QN AUTO: 26.3 PG (ref 27–33)
MCHC RBC AUTO-ENTMCNC: 29.7 G/DL (ref 33.6–35)
MCV RBC AUTO: 88.6 FL (ref 81.4–97.8)
MONOCYTES # BLD AUTO: 0.3 K/UL (ref 0–0.85)
MONOCYTES NFR BLD AUTO: 6.7 % (ref 0–13.4)
NEUTROPHILS # BLD AUTO: 3.49 K/UL (ref 2–7.15)
NEUTROPHILS NFR BLD: 77.8 % (ref 44–72)
NRBC # BLD AUTO: 0 K/UL
NRBC BLD-RTO: 0 /100 WBC
PLATELET # BLD AUTO: 186 K/UL (ref 164–446)
PLATELET BLD QL SMEAR: NORMAL
PMV BLD AUTO: 11.3 FL (ref 9–12.9)
POTASSIUM SERPL-SCNC: 5.6 MMOL/L (ref 3.6–5.5)
PROT SERPL-MCNC: 8.8 G/DL (ref 6–8.2)
RBC # BLD AUTO: 3.42 M/UL (ref 4.2–5.4)
RBC BLD AUTO: PRESENT
SARS-COV+SARS-COV-2 AG RESP QL IA.RAPID: NOTDETECTED
SODIUM SERPL-SCNC: 129 MMOL/L (ref 135–145)
SPECIMEN SOURCE: NORMAL
TROPONIN T SERPL-MCNC: 623 NG/L (ref 6–19)
WBC # BLD AUTO: 4.5 K/UL (ref 4.8–10.8)

## 2022-03-06 PROCEDURE — 80053 COMPREHEN METABOLIC PANEL: CPT

## 2022-03-06 PROCEDURE — 84484 ASSAY OF TROPONIN QUANT: CPT

## 2022-03-06 PROCEDURE — 99284 EMERGENCY DEPT VISIT MOD MDM: CPT

## 2022-03-06 PROCEDURE — 85025 COMPLETE CBC W/AUTO DIFF WBC: CPT

## 2022-03-06 PROCEDURE — 83690 ASSAY OF LIPASE: CPT

## 2022-03-06 PROCEDURE — 74176 CT ABD & PELVIS W/O CONTRAST: CPT

## 2022-03-06 PROCEDURE — 700111 HCHG RX REV CODE 636 W/ 250 OVERRIDE (IP): Performed by: EMERGENCY MEDICINE

## 2022-03-06 PROCEDURE — 96375 TX/PRO/DX INJ NEW DRUG ADDON: CPT

## 2022-03-06 PROCEDURE — 87426 SARSCOV CORONAVIRUS AG IA: CPT

## 2022-03-06 PROCEDURE — 36415 COLL VENOUS BLD VENIPUNCTURE: CPT

## 2022-03-06 PROCEDURE — 84703 CHORIONIC GONADOTROPIN ASSAY: CPT

## 2022-03-06 PROCEDURE — 94760 N-INVAS EAR/PLS OXIMETRY 1: CPT

## 2022-03-06 PROCEDURE — 96374 THER/PROPH/DIAG INJ IV PUSH: CPT

## 2022-03-06 PROCEDURE — 96376 TX/PRO/DX INJ SAME DRUG ADON: CPT

## 2022-03-06 RX ORDER — HYDROMORPHONE HYDROCHLORIDE 1 MG/ML
0.5 INJECTION, SOLUTION INTRAMUSCULAR; INTRAVENOUS; SUBCUTANEOUS ONCE
Status: COMPLETED | OUTPATIENT
Start: 2022-03-06 | End: 2022-03-06

## 2022-03-06 RX ORDER — AZITHROMYCIN 250 MG/1
TABLET, FILM COATED ORAL
Qty: 6 TABLET | Refills: 0 | Status: SHIPPED | OUTPATIENT
Start: 2022-03-06 | End: 2022-03-20

## 2022-03-06 RX ORDER — AZITHROMYCIN 250 MG/1
TABLET, FILM COATED ORAL
Qty: 6 TABLET | Refills: 0 | Status: SHIPPED | OUTPATIENT
Start: 2022-03-06 | End: 2022-03-06 | Stop reason: SDUPTHER

## 2022-03-06 RX ORDER — ONDANSETRON 2 MG/ML
4 INJECTION INTRAMUSCULAR; INTRAVENOUS ONCE
Status: COMPLETED | OUTPATIENT
Start: 2022-03-06 | End: 2022-03-06

## 2022-03-06 RX ADMIN — HYDROMORPHONE HYDROCHLORIDE 0.5 MG: 1 INJECTION, SOLUTION INTRAMUSCULAR; INTRAVENOUS; SUBCUTANEOUS at 10:34

## 2022-03-06 RX ADMIN — HYDROMORPHONE HYDROCHLORIDE 0.5 MG: 1 INJECTION, SOLUTION INTRAMUSCULAR; INTRAVENOUS; SUBCUTANEOUS at 12:25

## 2022-03-06 RX ADMIN — ONDANSETRON 4 MG: 2 INJECTION INTRAMUSCULAR; INTRAVENOUS at 10:34

## 2022-03-06 ASSESSMENT — FIBROSIS 4 INDEX: FIB4 SCORE: 0.67

## 2022-03-06 NOTE — ED NOTES
Jean Paul from Lab called with critical result of 623 troponin at 1144. Critical lab result read back to Jean Paul.   Dr. Cyr notified of critical lab result at 1144.  Critical lab result read back by Dr. Cyr.

## 2022-03-06 NOTE — ED NOTES
VSS Pain free D/C inst reviewed To fill and complete ABX Rest Return for worsening S/S D/C ambul with mother Stable improved condn

## 2022-03-06 NOTE — ED NOTES
Med rec updated and complete, per pt   Allergies reviewed, per pt   Interviewed pt with mother at bedside with permission from pt.

## 2022-03-06 NOTE — ED TRIAGE NOTES
"This is a 26 y.o. female with a medical history significant for lupus, end-stage renal disease on hemodialysis,  GI bleed, GERD, and chronic hypoxemia on 2 to 3 L via NC.  She was seen in our department 2 days ago and evaluated/treated for recurrence of chest pain.  She returns today complaining of abdominal pain with episodic nausea persisting since this past Friday.  Chief Complaint   Patient presents with   • Abdominal Pain   • Nausea     BP (!) 167/115   Pulse 61   Temp 36 °C (96.8 °F) (Temporal)   Resp 20   Ht 1.651 m (5' 5\")   Wt 53 kg (116 lb 13.5 oz)   LMP 02/18/2022   SpO2 91%   BMI 19.44 kg/m²   Has this patient been vaccinated for COVID YES  If not, would they like to be vaccinated while in the ER if eligible?  N/A  Would the patient like to speak with the ERP about the possibility of receiving the COVID vaccine today before making a decision? N/A    "

## 2022-03-06 NOTE — ED PROVIDER NOTES
ED Provider Note    Scribed for Alejandro Cyr M.D. by Bunny Gould. 3/6/2022  9:27 AM    Primary care provider: Ella Matthew M.D.  Means of arrival: Walk in  History obtained from: Patient  History limited by: None    CHIEF COMPLAINT  Chief Complaint   Patient presents with    Abdominal Pain    Nausea       [facility]  Lily Nicole is a 26 y.o. female who presents to the Emergency Department for evaluation of upper abdominal pain onset two days ago. Patient was seen two days ago for abdominal pain where her labs appeared normal. Patient returns today because her pain is still persisting. Her abdominal pain has since not resolved. She admits to associated symptoms of abdominal swelling last night, pain with palpation of abdomen, flank pain, but denies vomiting or diarrhea. She is on dialysis at this time and had a normal run two days ago when she last had dialysis. Patient has a history of renal disease and lee. No alleviating factors were reported. She has a history of cholecystectomy.     REVIEW OF SYSTEMS  Pertinent positives include upper abdominal pain, flank pain, abdominal swelling.   Pertinent negatives include no vomiting or diarrhea.    All other systems reviewed and negative. See [facility] for further details.       PAST MEDICAL HISTORY   has a past medical history of Anemia (01/17/2018), AVF (arteriovenous fistula) ([facility]), Chest pain, Chest tightness, Daytime sleepiness, Dialysis patient ([facility]), Difficulty breathing, ESRD (end stage renal disease) on dialysis ([facility]) (01/17/2018), Gasping for breath, Heart burn, Hypertension (01/17/2018), Indigestion, Lupus ([facility]), Migraines (01/17/2018), Painful breathing, Palpitations, Seizure ([facility]) (2013), Shortness of breath, Swelling of lower extremity, and Wheezing.    SURGICAL HISTORY   has a past surgical history that includes ronak by laparoscopy (4/5/2010); av fistula creation (Right); angioplasty (01/17/2018); other; other; gastroscopy-endo (12/9/2019);  gastroscopy (N/A, 5/30/2020); gastroscopy-endo (9/18/2020); upper gi endoscopy,ctrl bleed (11/12/2020); upper gi endoscopy,biopsy (11/12/2020); gastroscopy-endo (11/12/2020); colonoscopy,diagnostic (1/8/2021); upper gi endoscopy,diagnosis (1/8/2021); upper gi endoscopy,ctrl bleed (1/8/2021); upper gi endoscopy,diagnosis (3/5/2021); upper gi endoscopy,diagnosis (N/A, 3/19/2021); upper gi endoscopy,ctrl bleed (3/19/2021); and bronchoscopy,diagnostic (Bilateral, 5/13/2021).    SOCIAL HISTORY  Social History     Tobacco Use    Smoking status: Never Smoker    Smokeless tobacco: Never Used   Vaping Use    Vaping Use: Never used   Substance Use Topics    Alcohol use: No    Drug use: No      Social History     Substance and Sexual Activity   Drug Use No       FAMILY HISTORY  Family History   Problem Relation Age of Onset    Diabetes Paternal Grandmother        CURRENT MEDICATIONS  Home Medications       Reviewed by Shefali Ontiveros (Pharmacy Tech) on 03/06/22 at 0941  Med List Status: Complete     Medication Last Dose Status   amLODIPine (NORVASC) 5 MG Tab 3/6/2022 Active   atenolol (TENORMIN) 25 MG Tab 3/6/2022 Active   cloNIDine (CATAPRES) 0.2 MG/24HR PATCH WEEKLY patch 3/1/2022 Active   hydroxychloroquine (PLAQUENIL) 200 MG Tab 3/6/2022 Active   mycophenolate sodium (MYFORTIC) 360 MG Tablet Delayed Response tablet 3/5/2022 Active   pantoprazole (PROTONIX) 40 MG Tablet Delayed Response 3/6/2022 Active   predniSONE (DELTASONE) 5 MG Tab 3/5/2022 Active                    ALLERGIES  Allergies   Allergen Reactions    Cephalexin Rash     Nausea and rash   Hives  .    Clindamycin Rash     Nausea and rash     Hive  .    Methylprednisolone      Anxious  Other reaction(s): Other-Reaction in Comments  Anxious    Metoprolol Rash and Nausea     Nausea and rash     .    Compazine      C/o anxiety    Fentanyl And Related Anxiety    Hydrocodone-Acetaminophen Rash and Nausea     Generalized rash  Generalized rash    Maxipime  "[Cefepime] Itching    Reglan [Metoclopramide] Anxiety    Tape Rash     Paper tape is ok       PHYSICAL EXAM  VITAL SIGNS: BP (!) 167/115   Pulse 61   Temp 36 °C (96.8 °F) (Temporal)   Resp 20   Ht 1.651 m (5' 5\")   Wt 53 kg (116 lb 13.5 oz)   LMP 02/18/2022   SpO2 91%   BMI 19.44 kg/m²     Constitutional: Chronically ill-appearing, Well-developed and well-nourished. Mild distress.   HENT: Head is normocephalic and atraumatic. Oropharynx is clear and moist without exudate or erythema.   Eyes: Pupils are equal, round, and reactive to light. Conjunctiva are normal.   Cardiovascular: Normal rate and regular rhythm. No murmur heard. Normal radial pulses.  Pulmonary/Chest: Breath sounds normal. No wheezes or rales.   Abdominal: Tenderness in epigastrium and right upper quadrant, No guarding or peritoneal signs. No palpable abdominal aortic aneurysm. No masses. No tenderness at McBurney's point.   Musculoskeletal: AV fistula in right upper extremity with palpable thrill, Extremities exhibit normal range of motion without edema or tenderness.   Neurological: Awake, alert and oriented to person, place, and time. No focal deficits noted.  Skin: Skin is warm and dry. No rash.  Psychiatric: Normal mood and affect. Appropriate for clinical situation      DIAGNOSTIC STUDIES / PROCEDURES    LABS  Results for orders placed or performed during the hospital encounter of 03/06/22   CBC WITH DIFFERENTIAL   Result Value Ref Range    WBC 4.5 (L) 4.8 - 10.8 K/uL    RBC 3.42 (L) 4.20 - 5.40 M/uL    Hemoglobin 9.0 (L) 12.0 - 16.0 g/dL    Hematocrit 30.3 (L) 37.0 - 47.0 %    MCV 88.6 81.4 - 97.8 fL    MCH 26.3 (L) 27.0 - 33.0 pg    MCHC 29.7 (L) 33.6 - 35.0 g/dL    RDW 59.2 (H) 35.9 - 50.0 fL    Platelet Count 186 164 - 446 K/uL    MPV 11.3 9.0 - 12.9 fL    Neutrophils-Polys 77.80 (H) 44.00 - 72.00 %    Lymphocytes 13.40 (L) 22.00 - 41.00 %    Monocytes 6.70 0.00 - 13.40 %    Eosinophils 1.30 0.00 - 6.90 %    Basophils 0.40 0.00 - " 1.80 %    Immature Granulocytes 0.40 0.00 - 0.90 %    Nucleated RBC 0.00 /100 WBC    Neutrophils (Absolute) 3.49 2.00 - 7.15 K/uL    Lymphs (Absolute) 0.60 (L) 1.00 - 4.80 K/uL    Monos (Absolute) 0.30 0.00 - 0.85 K/uL    Eos (Absolute) 0.06 0.00 - 0.51 K/uL    Baso (Absolute) 0.02 0.00 - 0.12 K/uL    Immature Granulocytes (abs) 0.02 0.00 - 0.11 K/uL    NRBC (Absolute) 0.00 K/uL    Anisocytosis 1+    HCG QUAL SERUM   Result Value Ref Range    Beta-Hcg Qualitative Serum Negative Negative   PLATELET ESTIMATE   Result Value Ref Range    Plt Estimation Normal    MORPHOLOGY   Result Value Ref Range    RBC Morphology Present    DIFFERENTIAL COMMENT   Result Value Ref Range    Comments-Diff see below    Comp Metabolic Panel   Result Value Ref Range    Sodium 129 (L) 135 - 145 mmol/L    Potassium 5.6 (H) 3.6 - 5.5 mmol/L    Chloride 88 (L) 96 - 112 mmol/L    Co2 29 20 - 33 mmol/L    Anion Gap 12.0 7.0 - 16.0    Glucose 82 65 - 99 mg/dL    Bun 41 (H) 8 - 22 mg/dL    Creatinine 5.39 (HH) 0.50 - 1.40 mg/dL    Calcium 8.5 8.4 - 10.2 mg/dL    AST(SGOT) 21 12 - 45 U/L    ALT(SGPT) 8 2 - 50 U/L    Alkaline Phosphatase 72 30 - 99 U/L    Total Bilirubin 0.4 0.1 - 1.5 mg/dL    Albumin 2.6 (L) 3.2 - 4.9 g/dL    Total Protein 8.8 (H) 6.0 - 8.2 g/dL    Globulin 6.2 (H) 1.9 - 3.5 g/dL    A-G Ratio 0.4 g/dL   TROPONIN   Result Value Ref Range    Troponin T 623 (H) 6 - 19 ng/L   LIPASE   Result Value Ref Range    Lipase 12 7 - 58 U/L   ESTIMATED GFR   Result Value Ref Range    GFR If  12 (A) >60 mL/min/1.73 m 2    GFR If Non African American 10 (A) >60 mL/min/1.73 m 2   SARS-COV Antigen CLARY   Result Value Ref Range    SARS-CoV-2 Source NP Swab     SARS-COV ANTIGEN CLARY NotDetected NotDetected       All labs reviewed by me.    RADIOLOGY  CT-RENAL COLIC EVALUATION(A/P W/O)   Final Result      1.  No evidence of renal or ureteral stone or evidence of hydronephrosis. No evidence of inflammatory change.      2.  Splenomegaly.       3.  Enlarged retroperitoneal lymph nodes.      4.  Atrophic kidneys consistent with chronic medical renal disease.      5.  Prior cholecystectomy.      6.  New ill-defined groundglass infiltrates and nodules within the lung bases bilaterally. Consideration should be given for infectious process to include Covid pneumonia.           The radiologist's interpretation of all radiological studies have been reviewed by me.    COURSE & MEDICAL DECISION MAKING  Nursing notes, VS, PMSFHx reviewed in chart.     Review of past medical records shows the patient has a history of renal disease and lee.      9:27 AM - Patient seen and examined at bedside. I informed patient of plan to obtain labs and treat pain. Patient will be treated with Zofran 4 mg injection and Dilaudid 0.5 mg injection.  Ordered troponin, CBC w diff, CMP, lipase, UA culture, HCG qual,  to evaluate her symptoms. The differential diagnoses include but are not limited to: common bile duct stone, pancreatitis, gastritis, colitis    CT scan demonstrates findings possibly consistent with Covid pneumonia.  Covid test however was negative.  She may have an atypical pneumonia.  I do not the patient was here for chest pain recently.  Possibly this is also related.  Patient does not have other etiology for abdominal pain therefore I feel that lower lobe pneumonia is likely the etiology.    1:07 PM Patient was reevaluated at bedside. Discussed lab and radiology results with the patient and informed them that pneumonia was picked up in patient's scan. I discussed with patient that her abdominal pain may be secondary to her pneumonia. Patient will be discharged with antibiotics. I discussed plan for discharge and follow up as outlined below. The patient verbalizes they feel comfortable going home. The patient is stable for discharge at this time and will return for any new or worsening symptoms. Patient verbalizes understanding and support with my plan for discharge.      The patient will return for new or worsening symptoms and is stable at the time of discharge.    The patient is referred to a primary physician for blood pressure management, diabetic screening, and for all other preventative health concerns.    DISPOSITION:  Patient will be discharged home in stable condition.    FOLLOW UP:  Ella Matthew M.D.  [address]  [place] [id number] [phone]    Schedule an appointment as soon as possible for a visit       [facility], Emergency Dept  [address]  [facility] [address] [phone]    If symptoms worsen      OUTPATIENT MEDICATIONS:  Current Discharge Medication List        START taking these medications    Details   azithromycin (ZITHROMAX) 250 MG Tab Take as directed  Qty: 6 Tablet, Refills: 0    Associated Diagnoses: Pneumonia of both lower lobes due to infectious organism           FINAL IMPRESSION  1. Pneumonia of both lower lobes due to infectious organism          IBunny (Gabrielle), am scribing for, and in the presence of, Alejandro Cyr M.D..    Electronically signed by: Bunny Gould (Gabrielle), 3/6/2022    IAlejandro M.D. personally performed the services described in this documentation, as scribed by Bunny Gould in my presence, and it is both accurate and complete. C    The note accurately reflects work and decisions made by me.  Alejandro Cyr M.D.  3/6/2022  3:29 PM

## 2022-03-08 ENCOUNTER — APPOINTMENT (OUTPATIENT)
Dept: ONCOLOGY | Facility: MEDICAL CENTER | Age: 27
End: 2022-03-08
Attending: INTERNAL MEDICINE
Payer: COMMERCIAL

## 2022-03-11 ENCOUNTER — HOSPITAL ENCOUNTER (EMERGENCY)
Facility: MEDICAL CENTER | Age: 27
End: 2022-03-11
Attending: EMERGENCY MEDICINE
Payer: COMMERCIAL

## 2022-03-11 ENCOUNTER — APPOINTMENT (OUTPATIENT)
Dept: RADIOLOGY | Facility: MEDICAL CENTER | Age: 27
End: 2022-03-11
Attending: EMERGENCY MEDICINE
Payer: COMMERCIAL

## 2022-03-11 VITALS
RESPIRATION RATE: 18 BRPM | HEART RATE: 92 BPM | SYSTOLIC BLOOD PRESSURE: 147 MMHG | OXYGEN SATURATION: 98 % | TEMPERATURE: 98.3 F | DIASTOLIC BLOOD PRESSURE: 94 MMHG

## 2022-03-11 DIAGNOSIS — M54.50 CHRONIC BILATERAL LOW BACK PAIN WITHOUT SCIATICA: ICD-10-CM

## 2022-03-11 DIAGNOSIS — G89.29 CHRONIC BILATERAL LOW BACK PAIN WITHOUT SCIATICA: ICD-10-CM

## 2022-03-11 PROCEDURE — 72131 CT LUMBAR SPINE W/O DYE: CPT

## 2022-03-11 PROCEDURE — 96374 THER/PROPH/DIAG INJ IV PUSH: CPT

## 2022-03-11 PROCEDURE — 71045 X-RAY EXAM CHEST 1 VIEW: CPT

## 2022-03-11 PROCEDURE — 700111 HCHG RX REV CODE 636 W/ 250 OVERRIDE (IP): Performed by: EMERGENCY MEDICINE

## 2022-03-11 PROCEDURE — 96375 TX/PRO/DX INJ NEW DRUG ADDON: CPT

## 2022-03-11 PROCEDURE — 72192 CT PELVIS W/O DYE: CPT

## 2022-03-11 PROCEDURE — 99284 EMERGENCY DEPT VISIT MOD MDM: CPT

## 2022-03-11 RX ORDER — ONDANSETRON 2 MG/ML
4 INJECTION INTRAMUSCULAR; INTRAVENOUS ONCE
Status: COMPLETED | OUTPATIENT
Start: 2022-03-11 | End: 2022-03-11

## 2022-03-11 RX ORDER — MORPHINE SULFATE 4 MG/ML
4 INJECTION INTRAVENOUS ONCE
Status: COMPLETED | OUTPATIENT
Start: 2022-03-11 | End: 2022-03-11

## 2022-03-11 RX ORDER — SODIUM CHLORIDE, SODIUM LACTATE, POTASSIUM CHLORIDE, CALCIUM CHLORIDE 600; 310; 30; 20 MG/100ML; MG/100ML; MG/100ML; MG/100ML
1000 INJECTION, SOLUTION INTRAVENOUS ONCE
Status: DISCONTINUED | OUTPATIENT
Start: 2022-03-11 | End: 2022-03-11 | Stop reason: HOSPADM

## 2022-03-11 RX ADMIN — MORPHINE SULFATE 4 MG: 4 INJECTION INTRAVENOUS at 14:39

## 2022-03-11 RX ADMIN — ONDANSETRON 4 MG: 2 INJECTION INTRAMUSCULAR; INTRAVENOUS at 14:40

## 2022-03-11 ASSESSMENT — PAIN DESCRIPTION - PAIN TYPE
TYPE: ACUTE PAIN
TYPE: ACUTE PAIN

## 2022-03-11 NOTE — ED NOTES
Pt provided with discharge paper work and follow up care instructions. Pt declines questions. Pt to ambulate out of ER with home oxygen.

## 2022-03-11 NOTE — DISCHARGE INSTRUCTIONS
Call your primary care doctor and arrange office recheck during the week.  You may also wish to follow-up with your pain management doctor.  Return here if you need emergency medical care

## 2022-03-11 NOTE — ED TRIAGE NOTES
Chief Complaint   Patient presents with   • Groin Pain   • Low Back Pain      pt BIB [facility] fro lower back pain and bilateral groin pain. Pt was given versed, morphine and Zofran I route. Pt reports pain level of 6/10, some relief with medication given. Pt alert and oriented, no signs of distress.

## 2022-03-12 NOTE — ED PROVIDER NOTES
ED Provider Note    CHIEF COMPLAINT  Chief Complaint   Patient presents with   • Groin Pain   • Low Back Pain       HPI  Lily Nicole is a 26 y.o. female who presents to the emergency department complaining of low back pain and left hip pain.  The patient tells me that this started about an hour ago when she got out of her car chart review shows she has a history of chronic low back pain and avascular necrosis of both hips.  The patient says that she does see pain management for chronic pain problems.  She does not recognize any other exacerbating or alleviating factors or precipitating events.  The patient is concerned about possible pneumonia stating that she was told she might have pneumonia during her last ER visit but she does not have a productive cough or hemoptysis and there is been no fever.  The patient says most of her pain is in the left sacroiliac area and radiates forwards towards the left hip.  She does not have new weakness or radiation down both legs the extent of radiation is into the hips bilaterally left greater than right.    REVIEW OF SYSTEMS chart review shows the patient has many previous ER visits for chronic pain problems she has had multiple evaluations for abdominal pain chest pain low back pain.  I reviewed her most recent ER visit and at that time she was evaluated for complaint of abdominal pain and she had a CT renal study and the radiology report indicates possible findings in the lung bases bilaterally which might suggest COVID-19 pneumonia however the patient had a Covid test which was negative during the same visit.  All other systems negative    PAST MEDICAL HISTORY  Past Medical History:   Diagnosis Date   • Anemia 01/17/2018   • AVF (arteriovenous fistula) ([facility])     Right Arm   • Chest pain    • Chest tightness    • Daytime sleepiness    • Dialysis patient ([facility])      dialysis, M,W,F Elizabeth/Joni   • Difficulty breathing    • ESRD (end stage renal disease) on dialysis  "([facility]) 01/17/2018    Twan Fu   • Gasping for breath    • Heart burn    • Hypertension 01/17/2018    \"Controlled with medication\"   • Indigestion    • Lupus ([facility])    • Migraines 01/17/2018   • Painful breathing    • Palpitations    • Seizure ([facility]) 2013    from high blood pressure, reports 1 time event   • Shortness of breath    • Swelling of lower extremity    • Wheezing        FAMILY HISTORY  Family History   Problem Relation Age of Onset   • Diabetes Paternal Grandmother        SOCIAL HISTORY  Social History     Socioeconomic History   • Marital status: Single   Tobacco Use   • Smoking status: Never Smoker   • Smokeless tobacco: Never Used   Vaping Use   • Vaping Use: Never used   Substance and Sexual Activity   • Alcohol use: No   • Drug use: No       SURGICAL HISTORY  Past Surgical History:   Procedure Laterality Date   • RI BRONCHOSCOPY,DIAGNOSTIC Bilateral 5/13/2021    Procedure: BRONCHOSCOPY, BRONCHOALVEOLAR LAVAGE;  Surgeon: William Spangler M.D.;  Location: [facility];  Service: Pulmonary   • RI UPPER GI ENDOSCOPY,DIAGNOSIS N/A 3/19/2021    Procedure: GASTROSCOPY;  Surgeon: Waylon Mcqueen M.D.;  Location: [facility];  Service: Gastroenterology   • RI UPPER GI ENDOSCOPY,CTRL BLEED  3/19/2021    Procedure: GASTROSCOPY, WITH ARGON PLASMA COAGULATION;  Surgeon: Waylon Mcqueen M.D.;  Location: [facility];  Service: Gastroenterology   • RI UPPER GI ENDOSCOPY,DIAGNOSIS  3/5/2021    Procedure: GASTROSCOPY - W/HEMOSTASIS;  Surgeon: Ej Silva M.D.;  Location: [facility];  Service: Gastroenterology   • RI COLONOSCOPY,DIAGNOSTIC  1/8/2021    Procedure: COLONOSCOPY;  Surgeon: Herbert Contreras M.D.;  Location: [facility];  Service: Gastroenterology   • RI UPPER GI ENDOSCOPY,DIAGNOSIS  1/8/2021    Procedure: GASTROSCOPY;  Surgeon: Herbert Contreras M.D.;  Location: [facility];  Service: Gastroenterology   • RI UPPER GI ENDOSCOPY,CTRL BLEED  1/8/2021    " "Procedure: GASTROSCOPY, WITH ARGON PLASMA COAGULATION;  Surgeon: Herbert Contreras M.D.;  Location: SURGERY [facility];  Service: Gastroenterology   • AK UPPER GI ENDOSCOPY,CTRL BLEED  11/12/2020    Procedure: GASTROSCOPY, WITH ARGON PLASMA COAGULATION;  Surgeon: Gadiel Whitney M.D.;  Location: [facility];  Service: Gastroenterology   • AK UPPER GI ENDOSCOPY,BIOPSY  11/12/2020    Procedure: GASTROSCOPY, WITH BIOPSY;  Surgeon: Gadiel Whitney M.D.;  Location: [facility];  Service: Gastroenterology   • GASTROSCOPY-ENDO  11/12/2020    Procedure: GASTROSCOPY;  Surgeon: Gadiel Whitney M.D.;  Location: [facility];  Service: Gastroenterology   • GASTROSCOPY-ENDO  9/18/2020    Procedure: GASTROSCOPY;  Surgeon: Gadiel Whitney M.D.;  Location: [facility];  Service: Gastroenterology   • GASTROSCOPY N/A 5/30/2020    Procedure: GASTROSCOPY;  Surgeon: Waylon Mcqueen M.D.;  Location: [facility];  Service: Gastroenterology   • GASTROSCOPY-ENDO  12/9/2019    Procedure: GASTROSCOPY;  Surgeon: Aaron Kam M.D.;  Location: [facility];  Service: Gastroenterology   • ANGIOPLASTY  01/17/2018    \"Right Arm AV-Fistulagram & Angioplastyx3\"   • ULI BY LAPAROSCOPY  4/5/2010    Performed by SYED MARTELL at SURGERY [facility] ORS   • AV FISTULA CREATION Right    • OTHER      renal biopsy x 3   • OTHER      bone marrow biopsy       CURRENT MEDICATIONS  Home Medications    **Home medications have not yet been reviewed for this encounter**         ALLERGIES  Allergies   Allergen Reactions   • Cephalexin Rash     Nausea and rash   Hives  .   • Clindamycin Rash     Nausea and rash     Hive  .   • Methylprednisolone      Anxious  Other reaction(s): Other-Reaction in Comments  Anxious   • Metoprolol Rash and Nausea     Nausea and rash     .   • Compazine      C/o anxiety   • Fentanyl And Related Anxiety   • Hydrocodone-Acetaminophen Rash and Nausea     Generalized " rash  Generalized rash   • Maxipime [Cefepime] Itching   • Reglan [Metoclopramide] Anxiety   • Tape Rash     Paper tape is ok       PHYSICAL EXAM  VITAL SIGNS: /94   Pulse 92   Temp 36.8 °C (98.3 °F) (Temporal)   Resp 18   LMP 02/18/2022   SpO2 98%    Oxygen saturation is interpreted as adequate  Constitutional: Awake and verbal she does appear chronically ill but does not look acutely distressed  HENT: No sign of acute trauma to the head  Eyes: No erythema discharge or jaundice  Neck: Trachea midline no JVD  Cardiovascular: Regular rate and rhythm  Lungs: Clear and equal bilaterally with no apparent difficulty breathing  Abdomen/Back: No reproducible tenderness of the cervical thoracic or lumbar spine.  The patient indicates the source of the pain seems to be in the left sacroiliac area.  Abdomen is soft nondistended nontender.  Skin: Warm and dry  Musculoskeletal: No acute bony deformity there appears to be an AV fistula in the right upper arm the patient has adequate range of motion of both lower extremities and the lower extremities are warm not ice cold and there is no unilateral difference  Neurologic: Awake lucid verbal moving all extremities without difficulty    CHART REVIEW  Chart review was conducted as summarized above in the history of present illness    Radiology  DX-CHEST-PORTABLE (1 VIEW)   Final Result      Stable chest with cardiac silhouette enlargement. No consolidation      CT-PELVIS W/O   Final Result      1.  No evidence of pelvic or proximal femoral fracture.      2.  Again seen avascular necrosis involving the femoral heads, left greater than right.      3.  Small amount of free fluid dependently within the pelvis.      4.  Anasarca.      CT-LSPINE W/O PLUS RECONS   Final Result      No evidence of fracture of the lumbar spine.          MEDICAL DECISION MAKING and DISPOSITION  In the emergency department an IV was established the patient was given intravenous morphine and Zofran  and for mild tachycardia at the time of arrival intravenous fluids.  Reevaluation shows the patient looks very very comfortable and her vital signs of normalized.  I reviewed all the findings with her.  At this point in time I think this is an exacerbation of her chronic pain and I have inquired as to whether or not she is ever seen an orthopedic doctor regarding her bilateral hip avascular necrosis and she says that she has not so I recommended that she talk to her doctor about a referral to orthopedics for this.  I do not think that this is acute.  The patient sees a pain management doctor I encouraged her to continue her pain regiment at home and to call her doctor in the morning and arrange office recheck next week.  If she feels that she has need for emergency medical care she is to return here for recheck    IMPRESSION  1.  Chronic low back pain with exacerbation  2.  Chronic bilateral hip avascular necrosis         Electronically signed by: Jamisno Zimmerman M.D., 3/11/2022 6:54 PM

## 2022-03-16 ENCOUNTER — HOSPITAL ENCOUNTER (EMERGENCY)
Facility: MEDICAL CENTER | Age: 27
End: 2022-03-16
Attending: STUDENT IN AN ORGANIZED HEALTH CARE EDUCATION/TRAINING PROGRAM
Payer: COMMERCIAL

## 2022-03-16 VITALS
WEIGHT: 117.73 LBS | DIASTOLIC BLOOD PRESSURE: 105 MMHG | HEART RATE: 92 BPM | HEIGHT: 65 IN | BODY MASS INDEX: 19.61 KG/M2 | OXYGEN SATURATION: 100 % | RESPIRATION RATE: 16 BRPM | SYSTOLIC BLOOD PRESSURE: 149 MMHG | TEMPERATURE: 97.5 F

## 2022-03-16 DIAGNOSIS — G43.809 OTHER MIGRAINE WITHOUT STATUS MIGRAINOSUS, NOT INTRACTABLE: ICD-10-CM

## 2022-03-16 DIAGNOSIS — S16.1XXA STRAIN OF NECK MUSCLE, INITIAL ENCOUNTER: ICD-10-CM

## 2022-03-16 LAB — HCG SERPL QL: NEGATIVE

## 2022-03-16 PROCEDURE — 96374 THER/PROPH/DIAG INJ IV PUSH: CPT

## 2022-03-16 PROCEDURE — 99284 EMERGENCY DEPT VISIT MOD MDM: CPT

## 2022-03-16 PROCEDURE — 84703 CHORIONIC GONADOTROPIN ASSAY: CPT

## 2022-03-16 PROCEDURE — 96375 TX/PRO/DX INJ NEW DRUG ADDON: CPT

## 2022-03-16 PROCEDURE — 700111 HCHG RX REV CODE 636 W/ 250 OVERRIDE (IP): Performed by: STUDENT IN AN ORGANIZED HEALTH CARE EDUCATION/TRAINING PROGRAM

## 2022-03-16 RX ORDER — MORPHINE SULFATE 4 MG/ML
4 INJECTION INTRAVENOUS ONCE
Status: COMPLETED | OUTPATIENT
Start: 2022-03-16 | End: 2022-03-16

## 2022-03-16 RX ORDER — LORAZEPAM 2 MG/ML
0.5 INJECTION INTRAMUSCULAR ONCE
Status: COMPLETED | OUTPATIENT
Start: 2022-03-16 | End: 2022-03-16

## 2022-03-16 RX ORDER — ONDANSETRON 2 MG/ML
8 INJECTION INTRAMUSCULAR; INTRAVENOUS ONCE
Status: COMPLETED | OUTPATIENT
Start: 2022-03-16 | End: 2022-03-16

## 2022-03-16 RX ORDER — SODIUM CHLORIDE 9 MG/ML
1000 INJECTION, SOLUTION INTRAVENOUS ONCE
Status: DISCONTINUED | OUTPATIENT
Start: 2022-03-16 | End: 2022-03-16

## 2022-03-16 RX ADMIN — MORPHINE SULFATE 4 MG: 4 INJECTION INTRAVENOUS at 01:57

## 2022-03-16 RX ADMIN — ONDANSETRON 8 MG: 2 INJECTION INTRAMUSCULAR; INTRAVENOUS at 01:58

## 2022-03-16 RX ADMIN — LORAZEPAM 0.5 MG: 2 INJECTION INTRAMUSCULAR; INTRAVENOUS at 01:58

## 2022-03-16 ASSESSMENT — PAIN DESCRIPTION - PAIN TYPE
TYPE: ACUTE PAIN
TYPE: ACUTE PAIN

## 2022-03-16 ASSESSMENT — ENCOUNTER SYMPTOMS
BLURRED VISION: 0
FALLS: 0
SHORTNESS OF BREATH: 0
ABDOMINAL PAIN: 0
SORE THROAT: 0
DOUBLE VISION: 0
HEADACHES: 1
CHILLS: 0
NAUSEA: 1
COUGH: 0
VOMITING: 0
FEVER: 0
LOSS OF CONSCIOUSNESS: 0
NECK PAIN: 0

## 2022-03-16 ASSESSMENT — FIBROSIS 4 INDEX: FIB4 SCORE: 0.96

## 2022-03-16 NOTE — ED TRIAGE NOTES
"Chief Complaint   Patient presents with   • Headache     That radiates into left shoulder    • Anxiety     BP (!) 165/123   Pulse 97   Temp 36.1 °C (97 °F) (Oral)   Resp 16   Ht 1.651 m (5' 5\")   Wt 53.4 kg (117 lb 11.6 oz)   LMP 02/18/2022   SpO2 100%   BMI 19.59 kg/m²     "

## 2022-03-20 ENCOUNTER — OUTPATIENT INFUSION SERVICES (OUTPATIENT)
Dept: ONCOLOGY | Facility: MEDICAL CENTER | Age: 27
End: 2022-03-20
Attending: INTERNAL MEDICINE
Payer: COMMERCIAL

## 2022-03-20 ENCOUNTER — HOSPITAL ENCOUNTER (EMERGENCY)
Facility: MEDICAL CENTER | Age: 27
End: 2022-03-20
Attending: EMERGENCY MEDICINE
Payer: COMMERCIAL

## 2022-03-20 VITALS
SYSTOLIC BLOOD PRESSURE: 157 MMHG | TEMPERATURE: 98.3 F | HEIGHT: 65 IN | WEIGHT: 117.73 LBS | RESPIRATION RATE: 18 BRPM | DIASTOLIC BLOOD PRESSURE: 101 MMHG | BODY MASS INDEX: 19.61 KG/M2 | OXYGEN SATURATION: 93 % | HEART RATE: 82 BPM

## 2022-03-20 DIAGNOSIS — D64.9 SYMPTOMATIC ANEMIA: ICD-10-CM

## 2022-03-20 DIAGNOSIS — M62.838 MUSCLE SPASM: ICD-10-CM

## 2022-03-20 LAB
ABO GROUP BLD: NORMAL
ANISOCYTOSIS BLD QL SMEAR: ABNORMAL
BASOPHILS # BLD AUTO: 0 % (ref 0–1.8)
BASOPHILS # BLD: 0 K/UL (ref 0–0.12)
BLD GP AB SCN SERPL QL: NORMAL
EOSINOPHIL # BLD AUTO: 0 K/UL (ref 0–0.51)
EOSINOPHIL NFR BLD: 0 % (ref 0–6.9)
ERYTHROCYTE [DISTWIDTH] IN BLOOD BY AUTOMATED COUNT: 60.8 FL (ref 35.9–50)
HCT VFR BLD AUTO: 26.7 % (ref 37–47)
HGB BLD-MCNC: 7.6 G/DL (ref 12–16)
LYMPHOCYTES # BLD AUTO: 0.4 K/UL (ref 1–4.8)
LYMPHOCYTES NFR BLD: 9.7 % (ref 22–41)
MANUAL DIFF BLD: NORMAL
MCH RBC QN AUTO: 25.5 PG (ref 27–33)
MCHC RBC AUTO-ENTMCNC: 28.5 G/DL (ref 33.6–35)
MCV RBC AUTO: 89.6 FL (ref 81.4–97.8)
MICROCYTES BLD QL SMEAR: ABNORMAL
MONOCYTES # BLD AUTO: 0.14 K/UL (ref 0–0.85)
MONOCYTES NFR BLD AUTO: 3.5 % (ref 0–13.4)
MORPHOLOGY BLD-IMP: NORMAL
NEUTROPHILS # BLD AUTO: 3.56 K/UL (ref 2–7.15)
NEUTROPHILS NFR BLD: 86.8 % (ref 44–72)
NRBC # BLD AUTO: 0 K/UL
NRBC BLD-RTO: 0 /100 WBC
OVALOCYTES BLD QL SMEAR: NORMAL
PLATELET # BLD AUTO: 310 K/UL (ref 164–446)
PLATELET BLD QL SMEAR: NORMAL
PMV BLD AUTO: 10.1 FL (ref 9–12.9)
POIKILOCYTOSIS BLD QL SMEAR: NORMAL
RBC # BLD AUTO: 2.98 M/UL (ref 4.2–5.4)
RBC BLD AUTO: PRESENT
RH BLD: NORMAL
WBC # BLD AUTO: 4.1 K/UL (ref 4.8–10.8)

## 2022-03-20 PROCEDURE — 86850 RBC ANTIBODY SCREEN: CPT

## 2022-03-20 PROCEDURE — 700111 HCHG RX REV CODE 636 W/ 250 OVERRIDE (IP): Performed by: EMERGENCY MEDICINE

## 2022-03-20 PROCEDURE — 20553 NJX 1/MLT TRIGGER POINTS 3/>: CPT

## 2022-03-20 PROCEDURE — 99282 EMERGENCY DEPT VISIT SF MDM: CPT | Mod: 25

## 2022-03-20 PROCEDURE — 36415 COLL VENOUS BLD VENIPUNCTURE: CPT

## 2022-03-20 PROCEDURE — 85027 COMPLETE CBC AUTOMATED: CPT

## 2022-03-20 PROCEDURE — 85007 BL SMEAR W/DIFF WBC COUNT: CPT

## 2022-03-20 PROCEDURE — 86901 BLOOD TYPING SEROLOGIC RH(D): CPT

## 2022-03-20 PROCEDURE — 86900 BLOOD TYPING SEROLOGIC ABO: CPT

## 2022-03-20 RX ORDER — 0.9 % SODIUM CHLORIDE 0.9 %
3 VIAL (ML) INJECTION PRN
Status: CANCELLED | OUTPATIENT
Start: 2022-03-20

## 2022-03-20 RX ORDER — DIPHENHYDRAMINE HYDROCHLORIDE 50 MG/ML
25 INJECTION INTRAMUSCULAR; INTRAVENOUS PRN
Status: CANCELLED | OUTPATIENT
Start: 2022-03-20

## 2022-03-20 RX ORDER — DIPHENHYDRAMINE HYDROCHLORIDE 50 MG/ML
25 INJECTION INTRAMUSCULAR; INTRAVENOUS PRN
Status: CANCELLED
Start: 2022-03-20

## 2022-03-20 RX ORDER — ACETAMINOPHEN 325 MG/1
650 TABLET ORAL PRN
Status: CANCELLED | OUTPATIENT
Start: 2022-03-20

## 2022-03-20 RX ORDER — SODIUM CHLORIDE 9 MG/ML
INJECTION, SOLUTION INTRAVENOUS CONTINUOUS
Status: CANCELLED | OUTPATIENT
Start: 2022-03-20

## 2022-03-20 RX ORDER — 0.9 % SODIUM CHLORIDE 0.9 %
10 VIAL (ML) INJECTION PRN
Status: CANCELLED | OUTPATIENT
Start: 2022-03-20

## 2022-03-20 RX ORDER — LIDOCAINE 50 MG/G
1 PATCH TOPICAL EVERY 24 HOURS
Qty: 5 PATCH | Refills: 0 | Status: SHIPPED | OUTPATIENT
Start: 2022-03-20 | End: 2022-03-25

## 2022-03-20 RX ORDER — 0.9 % SODIUM CHLORIDE 0.9 %
VIAL (ML) INJECTION PRN
Status: CANCELLED | OUTPATIENT
Start: 2022-03-20

## 2022-03-20 RX ORDER — ACETAMINOPHEN 325 MG/1
650 TABLET ORAL ONCE
Status: CANCELLED | OUTPATIENT
Start: 2022-03-20

## 2022-03-20 RX ORDER — HEPARIN SODIUM (PORCINE) LOCK FLUSH IV SOLN 100 UNIT/ML 100 UNIT/ML
500 SOLUTION INTRAVENOUS PRN
Status: CANCELLED | OUTPATIENT
Start: 2022-03-20

## 2022-03-20 RX ORDER — ACETAMINOPHEN 500 MG
1000 TABLET ORAL EVERY 6 HOURS PRN
Qty: 20 TABLET | Refills: 0 | Status: SHIPPED | OUTPATIENT
Start: 2022-03-20 | End: 2022-03-24

## 2022-03-20 RX ORDER — DIPHENHYDRAMINE HCL 25 MG
25 TABLET ORAL ONCE
Status: CANCELLED | OUTPATIENT
Start: 2022-03-20 | End: 2022-03-20

## 2022-03-20 RX ORDER — BUPIVACAINE HYDROCHLORIDE 2.5 MG/ML
30 INJECTION, SOLUTION EPIDURAL; INFILTRATION; INTRACAUDAL ONCE
Status: COMPLETED | OUTPATIENT
Start: 2022-03-20 | End: 2022-03-20

## 2022-03-20 RX ORDER — CYCLOBENZAPRINE HCL 10 MG
10 TABLET ORAL 3 TIMES DAILY PRN
Qty: 10 TABLET | Refills: 0 | Status: SHIPPED | OUTPATIENT
Start: 2022-03-20 | End: 2022-03-23

## 2022-03-20 RX ADMIN — BUPIVACAINE HYDROCHLORIDE 30 ML: 2.5 INJECTION, SOLUTION EPIDURAL; INFILTRATION; INTRACAUDAL; PERINEURAL at 01:45

## 2022-03-20 ASSESSMENT — FIBROSIS 4 INDEX: FIB4 SCORE: 0.96

## 2022-03-20 NOTE — ED TRIAGE NOTES
"26 yr old female to room with a family member  Chief Complaint   Patient presents with   • Neck Pain   • Back Pain     Arn     • Arm Pain     Patient report of left lateral neck radiating to the left upper back radiating down to her left arm since Tuesday.  Patient report of increase difficulty breathing when she lays down.      /100   Pulse 88   Temp 36.7 °C (98 °F) (Temporal)   Resp 18   Ht 1.651 m (5' 5\")   Wt 53.4 kg (117 lb 11.6 oz)   LMP 02/18/2022   SpO2 95%   BMI 19.59 kg/m²     "

## 2022-03-20 NOTE — ED NOTES
Needles and syringe at the bedside for the ER doctor.    Report to Shannon MARTINEZ and Ju MARTINEZ

## 2022-03-20 NOTE — DISCHARGE INSTRUCTIONS
Please take at 1000 mg of Tylenol 3 times a day. I have prescribed you lidocaine patches and Flexeril 10 mg 3 times a day for breakthrough pain. I recommend using a foam roller which can be purchased at a sporting good store 3 times a day to help with pain. You may benefit from talking with your PCP about physical therapy, stretching exercises and other pain management strategies for your muscle spasm. Thank you for coming in today and please return if any symptoms worsen. Thank you.

## 2022-03-20 NOTE — PROGRESS NOTES
Patient presents to [facility] for lab work possible blood transfusion on Tuesday 3/22/2022. Labs drawn from left wrist via 23g butterfly. Gauze and coban to site. Patient discharged to self care in no apparent distress.     Patient qualifies for blood transfusion, Hgb 7.6. This RN called patient, patient's mother answered phone this RN explained that patient would need to return on Tuesday 3/22/2022 at 0745 for 1 unit of RBCs.    BB Comm and COD sent.

## 2022-03-20 NOTE — ED PROVIDER NOTES
ED Provider Note  ED Provider Note    Scribed for Milind Dotson by Milind Dotson. 3/20/2022  1:04 AM    Primary care provider: Ella Matthew M.D.  Means of arrival: Private vehicle  History obtained from: Patient  History limited by: None    CHIEF COMPLAINT  Chief Complaint   Patient presents with   • Neck Pain   • Back Pain     Arn     • Arm Pain     Patient report of left lateral neck radiating to the left upper back radiating down to her left arm since Tuesday.  Patient report of increase difficulty breathing when she lays down.      HPI  Lily Nicole is a 26 y.o. female who presents to the Emergency Department with history of end-stage renal disease, on hemodialysis Monday Wednesday and Friday all secondary to lupus disease, has history of chronic headaches, presenting with muscle spasm and pain of the left trapezius, radiating down to the scapular region and up the neck on the left side of the paraspinal muscles.  Has been ongoing for over a week.  States she was seen here on 3/16 and treated with morphine, Ativan with mild relief but this did not last.  She states that muscle relaxers in general do not help with this and she feels anxious when she takes them.  She denies any nausea, vomiting, new headache, visual changes, rash, chest pain, shortness of breath.  States she did feel slightly fatigued today but otherwise has no complaints.  No abdominal or chest pain, no dysuria, hematuria, diarrhea or constipation.  Denies alcohol, drug or tobacco abuse.  Is vaccinated and boosted against COVID.  Quality: Aching  Duration: Over a week  Severity: Severe  Associated sx: None    REVIEW OF SYSTEMS  As above, all other systems reviewed and are negative.   See HPI for further details.     PAST MEDICAL HISTORY   has a past medical history of Anemia (01/17/2018), AVF (arteriovenous fistula) ([facility]), Chest pain, Chest tightness, Daytime sleepiness, Dialysis patient ([facility]), Difficulty breathing, ESRD  (end stage renal disease) on dialysis ([facility]) (01/17/2018), Gasping for breath, Heart burn, Hypertension (01/17/2018), Indigestion, Lupus (HCC), Migraines (01/17/2018), Painful breathing, Palpitations, Seizure ([facility]) (2013), Shortness of breath, Swelling of lower extremity, and Wheezing.  SURGICAL HISTORY   has a past surgical history that includes ronak by laparoscopy (4/5/2010); av fistula creation (Right); angioplasty (01/17/2018); other; other; gastroscopy-endo (12/9/2019); gastroscopy (N/A, 5/30/2020); gastroscopy-endo (9/18/2020); upper gi endoscopy,ctrl bleed (11/12/2020); upper gi endoscopy,biopsy (11/12/2020); gastroscopy-endo (11/12/2020); colonoscopy,diagnostic (1/8/2021); upper gi endoscopy,diagnosis (1/8/2021); upper gi endoscopy,ctrl bleed (1/8/2021); upper gi endoscopy,diagnosis (3/5/2021); upper gi endoscopy,diagnosis (N/A, 3/19/2021); upper gi endoscopy,ctrl bleed (3/19/2021); and bronchoscopy,diagnostic (Bilateral, 5/13/2021).  SOCIAL HISTORY  Social History     Tobacco Use   • Smoking status: Never Smoker   • Smokeless tobacco: Never Used   Vaping Use   • Vaping Use: Never used   Substance Use Topics   • Alcohol use: No   • Drug use: No      Social History     Substance and Sexual Activity   Drug Use No     FAMILY HISTORY  Family History   Problem Relation Age of Onset   • Diabetes Paternal Grandmother      CURRENT MEDICATIONS  Home Medications     Reviewed by Natalee Benavides R.N. (Registered Nurse) on 03/20/22 at 0110  Med List Status: Complete   Medication Last Dose Status   amLODIPine (NORVASC) 5 MG Tab 3/19/2022 Active   atenolol (TENORMIN) 25 MG Tab 3/19/2022 Active   cloNIDine (CATAPRES) 0.2 MG/24HR PATCH WEEKLY patch 3/17/2022 Active   hydroxychloroquine (PLAQUENIL) 200 MG Tab 3/19/2022 Active   mycophenolate sodium (MYFORTIC) 360 MG Tablet Delayed Response tablet 3/19/2022 Active   pantoprazole (PROTONIX) 40 MG Tablet Delayed Response 3/19/2022 Active   predniSONE (DELTASONE) 5 MG Tab  "3/19/2022 Active              ALLERGIES  Allergies   Allergen Reactions   • Cephalexin Rash     Nausea and rash   Hives  .   • Clindamycin Rash     Nausea and rash     Hive  .   • Methylprednisolone      Anxious  Other reaction(s): Other-Reaction in Comments  Anxious   • Metoprolol Rash and Nausea     Nausea and rash     .   • Compazine      C/o anxiety   • Fentanyl And Related Anxiety   • Hydrocodone-Acetaminophen Rash and Nausea     Generalized rash  Generalized rash   • Maxipime [Cefepime] Itching   • Reglan [Metoclopramide] Anxiety   • Tape Rash     Paper tape is ok     PHYSICAL EXAM  VITAL SIGNS:   Vitals:    03/20/22 0054   BP: 142/100   Pulse: 88   Resp: 18   Temp: 36.7 °C (98 °F)   TempSrc: Temporal   SpO2: 95%   Weight: 53.4 kg (117 lb 11.6 oz)   Height: 1.651 m (5' 5\")       Vitals: My interpretation: normotensive, not tachycardic, afebrile, not hypoxic    Reinterpretation of vitals: Unchanged    PE:   Gen: sitting comfortably, speaking clearly, appears in no acute distress   ENT: Mucous membranes moist, posterior pharynx clear, uvula midline, nares patent bilaterally   Neck: Supple, FROM  Pulmonary: Lungs are clear to auscultation bilaterally. No tachypnea  CV:  RRR, no murmur appreciated, pulses 2+ in both upper and lower extremities  Abdomen: soft, NT/ND; no rebound/guarding  : no CVA or suprapubic tenderness   Neuro: A&Ox4 (person, place, time, situation), speech fluent, gait steady, no focal deficits appreciated  Skin: No rash or lesions.  No pallor or jaundice.  No cyanosis.  Warm and dry.   MSK: Patient has reproducible tenderness over the left trapezius, left paraspinal cervical muscles, left scapular muscles.  When palpated, reproduces the pain that she is presenting for.    COURSE & MEDICAL DECISION MAKING  Nursing notes, VS, PMSFHx, labs, imaging, EKG reviewed in chart.    MDM: 1:04 AM Lily Nicole is a 26 y.o. female who presented with muscle spasms of the left trapezius, left " paraspinal cervical region and left scapular region.  Have been ongoing for the past week.  Given her intermittent tension headaches.  She has obvious reproducible tenderness in these regions.  She denies fever, and vital signs are normal.  The rest of her exam is unremarkable and seems to be at baseline.  She has never undergone trigger point injections for muscle spasms before and I offered her counseling on this and she was amenable.  See procedure note.  She received 3 trigger point injections, and massage therapy in the ED, afterwards she felt significant improvement.  I counseled her that this would not last and that medications would only be a Band-Aid and a bridging therapy and that ultimately she would need foam rolling, massage therapy and stretching exercises as well as physical therapy.  I will provide short course of Flexeril, lidocaine patch and acetaminophen to help.  She feels improved, she is asking for discharge home and I counseled her on return precautions and she verbalized understanding plan.    Trigger point Injection:   M54.50-M54.59  Muscle spasm  Patient was consented for trigger point injections for relief of muscle spasm.  3 or more trigger point injections were done in sterile conditions with alcohol prep wipe.  Bupivacaine with epinephrine was used.  Patient tolerated well.  There is no complications.  Injections were done myself.  Total time of 10 minutes.  Milind Dotson MD    FINAL IMPRESSION  1. Muscle spasm Acute      The note accurately reflects work and decisions made by me.  Milind Dotson  3/20/2022  1:05 AM

## 2022-03-21 ENCOUNTER — HOSPITAL ENCOUNTER (EMERGENCY)
Facility: MEDICAL CENTER | Age: 27
End: 2022-03-21
Attending: STUDENT IN AN ORGANIZED HEALTH CARE EDUCATION/TRAINING PROGRAM
Payer: COMMERCIAL

## 2022-03-21 VITALS
SYSTOLIC BLOOD PRESSURE: 145 MMHG | OXYGEN SATURATION: 100 % | BODY MASS INDEX: 18.72 KG/M2 | TEMPERATURE: 98.7 F | HEIGHT: 67 IN | HEART RATE: 86 BPM | DIASTOLIC BLOOD PRESSURE: 99 MMHG | WEIGHT: 119.27 LBS | RESPIRATION RATE: 18 BRPM

## 2022-03-21 DIAGNOSIS — R52 BODY ACHES: Primary | ICD-10-CM

## 2022-03-21 DIAGNOSIS — M32.9 SLE (SYSTEMIC LUPUS ERYTHEMATOSUS RELATED SYNDROME) (HCC): ICD-10-CM

## 2022-03-21 PROCEDURE — 96375 TX/PRO/DX INJ NEW DRUG ADDON: CPT

## 2022-03-21 PROCEDURE — 700111 HCHG RX REV CODE 636 W/ 250 OVERRIDE (IP): Performed by: STUDENT IN AN ORGANIZED HEALTH CARE EDUCATION/TRAINING PROGRAM

## 2022-03-21 PROCEDURE — 99284 EMERGENCY DEPT VISIT MOD MDM: CPT

## 2022-03-21 PROCEDURE — 96376 TX/PRO/DX INJ SAME DRUG ADON: CPT

## 2022-03-21 PROCEDURE — 96374 THER/PROPH/DIAG INJ IV PUSH: CPT

## 2022-03-21 RX ORDER — MORPHINE SULFATE 4 MG/ML
4 INJECTION INTRAVENOUS ONCE
Status: COMPLETED | OUTPATIENT
Start: 2022-03-21 | End: 2022-03-21

## 2022-03-21 RX ORDER — ONDANSETRON 2 MG/ML
8 INJECTION INTRAMUSCULAR; INTRAVENOUS ONCE
Status: COMPLETED | OUTPATIENT
Start: 2022-03-21 | End: 2022-03-21

## 2022-03-21 RX ADMIN — MORPHINE SULFATE 4 MG: 4 INJECTION INTRAVENOUS at 04:00

## 2022-03-21 RX ADMIN — MORPHINE SULFATE 4 MG: 4 INJECTION INTRAVENOUS at 03:29

## 2022-03-21 RX ADMIN — ONDANSETRON 8 MG: 2 INJECTION INTRAMUSCULAR; INTRAVENOUS at 03:29

## 2022-03-21 ASSESSMENT — ENCOUNTER SYMPTOMS
ABDOMINAL PAIN: 0
LOSS OF CONSCIOUSNESS: 0
SORE THROAT: 0
HEADACHES: 0
NECK PAIN: 0
VOMITING: 0
MYALGIAS: 1
SHORTNESS OF BREATH: 0
FALLS: 0
DOUBLE VISION: 0
COUGH: 0
FEVER: 0
NAUSEA: 0
CHILLS: 0
BLURRED VISION: 0

## 2022-03-21 ASSESSMENT — FIBROSIS 4 INDEX: FIB4 SCORE: 0.57

## 2022-03-21 ASSESSMENT — PAIN DESCRIPTION - PAIN TYPE
TYPE: CHRONIC PAIN
TYPE: CHRONIC PAIN

## 2022-03-21 NOTE — ED PROVIDER NOTES
ED Provider Note    Chief Complaint:   Body aches    HPI:  Lily Nicole is a very pleasant 26-year-old female who presents with body aches, nausea, fatigue, anxious, insomnia.  Patient has medical history significant for SLE and ESRD on dialysis, patient reports declining H&H on outpatient labs, plan for outpatient follow-up on Wednesday for transfusion.  Patient denies fevers or chills, chest pain, shortness of breath.  Patient reports that the symptoms are consistent with previous episodes of lupus flare.  Patient reports taking Tylenol without symptom improvement.  Patient reports that she typically does not have difficulty sleeping when she is not in pain.    Review of Systems:  Review of Systems   Constitutional: Positive for malaise/fatigue. Negative for chills and fever.   HENT: Negative for congestion and sore throat.    Eyes: Negative for blurred vision and double vision.   Respiratory: Negative for cough and shortness of breath.    Cardiovascular: Negative for chest pain and leg swelling.   Gastrointestinal: Negative for abdominal pain, nausea and vomiting.   Genitourinary: Negative for dysuria and hematuria.   Musculoskeletal: Positive for myalgias. Negative for falls and neck pain.   Skin: Negative for itching and rash.   Neurological: Negative for loss of consciousness and headaches.       Past Medical History:   has a past medical history of Anemia (01/17/2018), AVF (arteriovenous fistula) ([facility]), Chest pain, Chest tightness, Daytime sleepiness, Dialysis patient ([facility]), Difficulty breathing, ESRD (end stage renal disease) on dialysis ([facility]) (01/17/2018), Gasping for breath, Heart burn, Hypertension (01/17/2018), Indigestion, Lupus (HCC), Migraines (01/17/2018), Painful breathing, Palpitations, Seizure ([facility]) (2013), Shortness of breath, Swelling of lower extremity, and Wheezing.    Social History:  Social History     Tobacco Use   • Smoking status: Never Smoker   • Smokeless tobacco: Never Used  "  Vaping Use   • Vaping Use: Never used   Substance and Sexual Activity   • Alcohol use: No   • Drug use: No   • Sexual activity: Not on file       Surgical History:   has a past surgical history that includes ronak by laparoscopy (4/5/2010); av fistula creation (Right); angioplasty (01/17/2018); other; other; gastroscopy-endo (12/9/2019); gastroscopy (N/A, 5/30/2020); gastroscopy-endo (9/18/2020); upper gi endoscopy,ctrl bleed (11/12/2020); upper gi endoscopy,biopsy (11/12/2020); gastroscopy-endo (11/12/2020); colonoscopy,diagnostic (1/8/2021); upper gi endoscopy,diagnosis (1/8/2021); upper gi endoscopy,ctrl bleed (1/8/2021); upper gi endoscopy,diagnosis (3/5/2021); upper gi endoscopy,diagnosis (N/A, 3/19/2021); upper gi endoscopy,ctrl bleed (3/19/2021); and bronchoscopy,diagnostic (Bilateral, 5/13/2021).    Allergies:  Allergies   Allergen Reactions   • Cephalexin Rash     Nausea and rash   Hives  .   • Clindamycin Rash     Nausea and rash     Hive  .   • Methylprednisolone      Anxious  Other reaction(s): Other-Reaction in Comments  Anxious   • Metoprolol Rash and Nausea     Nausea and rash     .   • Compazine      C/o anxiety   • Fentanyl And Related Anxiety   • Hydrocodone-Acetaminophen Rash and Nausea     Generalized rash  Generalized rash   • Maxipime [Cefepime] Itching   • Reglan [Metoclopramide] Anxiety   • Tape Rash     Paper tape is ok       Physical Exam:  Vital Signs: /102   Pulse 90   Temp 36.9 °C (98.4 °F) (Temporal)   Resp 16   Ht 1.702 m (5' 7\")   Wt 54.1 kg (119 lb 4.3 oz)   SpO2 98%   BMI 18.68 kg/m²   Physical Exam  Vitals and nursing note reviewed.   Constitutional:       Comments: Patient is lying in bed supine, pleasant, conversant, speaking in complete sentences   HENT:      Head: Normocephalic and atraumatic.   Eyes:      Extraocular Movements: Extraocular movements intact.      Conjunctiva/sclera: Conjunctivae normal.      Pupils: Pupils are equal, round, and reactive to light. "   Cardiovascular:      Pulses: Normal pulses.      Comments: HR 83  Pulmonary:      Effort: Pulmonary effort is normal. No respiratory distress.   Musculoskeletal:         General: No swelling. Normal range of motion.      Cervical back: Normal range of motion. No rigidity.   Skin:     General: Skin is warm and dry.      Capillary Refill: Capillary refill takes less than 2 seconds.   Neurological:      Mental Status: She is alert.         Medical records reviewed for continuity of care.     Results for orders placed or performed in visit on 03/20/22   CBC WITH DIFFERENTIAL   Result Value Ref Range    WBC 4.1 (L) 4.8 - 10.8 K/uL    RBC 2.98 (L) 4.20 - 5.40 M/uL    Hemoglobin 7.6 (L) 12.0 - 16.0 g/dL    Hematocrit 26.7 (L) 37.0 - 47.0 %    MCV 89.6 81.4 - 97.8 fL    MCH 25.5 (L) 27.0 - 33.0 pg    MCHC 28.5 (L) 33.6 - 35.0 g/dL    RDW 60.8 (H) 35.9 - 50.0 fL    Platelet Count 310 164 - 446 K/uL    MPV 10.1 9.0 - 12.9 fL    Neutrophils-Polys 86.80 (H) 44.00 - 72.00 %    Lymphocytes 9.70 (L) 22.00 - 41.00 %    Monocytes 3.50 0.00 - 13.40 %    Eosinophils 0.00 0.00 - 6.90 %    Basophils 0.00 0.00 - 1.80 %    Nucleated RBC 0.00 /100 WBC    Neutrophils (Absolute) 3.56 2.00 - 7.15 K/uL    Lymphs (Absolute) 0.40 (L) 1.00 - 4.80 K/uL    Monos (Absolute) 0.14 0.00 - 0.85 K/uL    Eos (Absolute) 0.00 0.00 - 0.51 K/uL    Baso (Absolute) 0.00 0.00 - 0.12 K/uL    NRBC (Absolute) 0.00 K/uL    Anisocytosis 2+ (A)     Microcytosis 2+ (A)    DIFFERENTIAL MANUAL   Result Value Ref Range    Manual Diff Status PERFORMED    PERIPHERAL SMEAR REVIEW   Result Value Ref Range    Peripheral Smear Review see below    PLATELET ESTIMATE   Result Value Ref Range    Plt Estimation Normal    MORPHOLOGY   Result Value Ref Range    RBC Morphology Present     Poikilocytosis 1+     Ovalocytes 1+    COD (ADULT)   Result Value Ref Range    ABO Grouping Only O     Rh Grouping Only POS     Antibody Screen-Cod NEG        Radiology:  No orders to display         MDM:  Patient is not tachycardic, no hypotension, I do not believe the patient is suffering from acute hemorrhage.  I do believe the patient's progressive anemia is secondary to her underlying SLE and ESRD.  Patient has good follow-up with rheumatology and plan for transfusion this week.  At this time I believe it is appropriate to treat the patient's acute pain and nausea which is likely secondary to acute lupus flare.  Disposition pending symptom control.    Electronically signed by: Nabeel Og M.D., 3/21/2022, 3:14 AM    Patient has required 2 doses of IV pain medication and IV Zofran for symptom control.  Patient reports she is feeling better at this time and has follow-up outpatient with PCP, nephrology, rheumatology.  Patient has been encouraged to see her specialist and primary care providers to continue to manage her chronic lupus and acute flareups.    Repeat physical exam benign.  I doubt any serious emergency process at this time.  Patient and/or family, friends given strict return precautions and care instructions. They have demonstrated understanding of discharge instructions through teach back mechanism. Advised PCP follow-up in 1-2 days.  Patient/family/friend expresses understanding and agrees to plan.    This dictation has been created using voice recognition software. I am continuously working with the software to minimize the number of voice recognition errors and I have made every attempt to manually correct the errors within my dictation. However errors  related to this voice recognition software may still exist and should be interpreted within the appropriate context.     Electronically signed by: Nabeel Og M.D., 3/21/2022 4:42 AM      Disposition:  Home    Final Impression:  1. Body aches    2. SLE (systemic lupus erythematosus related syndrome) (HCC)

## 2022-03-21 NOTE — ED TRIAGE NOTES
"Chief Complaint   Patient presents with   • Body Aches     Body aches since yesterday morning, took tylenol and flexeril and patches w/o any relief, feeling achy and anxious, unable to sleep.     /94   Pulse 83   Temp 36.9 °C (98.4 °F) (Temporal)   Resp 16   Ht 1.702 m (5' 7\")   Wt 54.1 kg (119 lb 4.3 oz)   SpO2 100%   BMI 18.68 kg/m²     "

## 2022-03-21 NOTE — ED NOTES
PT BIB sister with full body aches that have been present since this morning.  PT feels anxious as well.

## 2022-03-22 ENCOUNTER — OUTPATIENT INFUSION SERVICES (OUTPATIENT)
Dept: ONCOLOGY | Facility: MEDICAL CENTER | Age: 27
End: 2022-03-22
Attending: INTERNAL MEDICINE
Payer: COMMERCIAL

## 2022-03-22 VITALS
SYSTOLIC BLOOD PRESSURE: 147 MMHG | TEMPERATURE: 97.4 F | WEIGHT: 116.18 LBS | HEIGHT: 66 IN | DIASTOLIC BLOOD PRESSURE: 90 MMHG | RESPIRATION RATE: 18 BRPM | BODY MASS INDEX: 18.67 KG/M2 | OXYGEN SATURATION: 98 % | HEART RATE: 88 BPM

## 2022-03-22 DIAGNOSIS — D64.9 SYMPTOMATIC ANEMIA: ICD-10-CM

## 2022-03-22 LAB
ABO GROUP BLD: NORMAL
BARCODED ABORH UBTYP: 5100
BARCODED PRD CODE UBPRD: NORMAL
BARCODED UNIT NUM UBUNT: NORMAL
BLD GP AB SCN SERPL QL: NORMAL
COMPONENT R 8504R: NORMAL
PRODUCT TYPE UPROD: NORMAL
RH BLD: NORMAL
UNIT STATUS USTAT: NORMAL

## 2022-03-22 PROCEDURE — 700102 HCHG RX REV CODE 250 W/ 637 OVERRIDE(OP): Performed by: NURSE PRACTITIONER

## 2022-03-22 PROCEDURE — 96374 THER/PROPH/DIAG INJ IV PUSH: CPT

## 2022-03-22 PROCEDURE — 36415 COLL VENOUS BLD VENIPUNCTURE: CPT

## 2022-03-22 PROCEDURE — 86922 COMPATIBILITY TEST ANTIGLOB: CPT

## 2022-03-22 PROCEDURE — 86901 BLOOD TYPING SEROLOGIC RH(D): CPT

## 2022-03-22 PROCEDURE — 86902 BLOOD TYPE ANTIGEN DONOR EA: CPT

## 2022-03-22 PROCEDURE — 700111 HCHG RX REV CODE 636 W/ 250 OVERRIDE (IP): Performed by: NURSE PRACTITIONER

## 2022-03-22 PROCEDURE — A9270 NON-COVERED ITEM OR SERVICE: HCPCS | Performed by: NURSE PRACTITIONER

## 2022-03-22 PROCEDURE — 700105 HCHG RX REV CODE 258: Performed by: NURSE PRACTITIONER

## 2022-03-22 PROCEDURE — 86850 RBC ANTIBODY SCREEN: CPT

## 2022-03-22 RX ORDER — SODIUM CHLORIDE 9 MG/ML
INJECTION, SOLUTION INTRAVENOUS CONTINUOUS
Status: CANCELLED | OUTPATIENT
Start: 2022-03-22

## 2022-03-22 RX ORDER — ACETAMINOPHEN 325 MG/1
650 TABLET ORAL PRN
Status: CANCELLED | OUTPATIENT
Start: 2022-03-22

## 2022-03-22 RX ORDER — 0.9 % SODIUM CHLORIDE 0.9 %
VIAL (ML) INJECTION PRN
Status: CANCELLED | OUTPATIENT
Start: 2022-03-22

## 2022-03-22 RX ORDER — 0.9 % SODIUM CHLORIDE 0.9 %
10 VIAL (ML) INJECTION PRN
Status: CANCELLED | OUTPATIENT
Start: 2022-03-22

## 2022-03-22 RX ORDER — DIPHENHYDRAMINE HCL 25 MG
25 TABLET ORAL ONCE
Status: CANCELLED | OUTPATIENT
Start: 2022-03-22 | End: 2022-03-22

## 2022-03-22 RX ORDER — DIPHENHYDRAMINE HYDROCHLORIDE 50 MG/ML
25 INJECTION INTRAMUSCULAR; INTRAVENOUS PRN
Status: CANCELLED
Start: 2022-03-22

## 2022-03-22 RX ORDER — DIPHENHYDRAMINE HYDROCHLORIDE 50 MG/ML
25 INJECTION INTRAMUSCULAR; INTRAVENOUS PRN
Status: CANCELLED | OUTPATIENT
Start: 2022-03-22

## 2022-03-22 RX ORDER — ACETAMINOPHEN 325 MG/1
650 TABLET ORAL ONCE
Status: COMPLETED | OUTPATIENT
Start: 2022-03-22 | End: 2022-03-22

## 2022-03-22 RX ORDER — SODIUM CHLORIDE 9 MG/ML
INJECTION, SOLUTION INTRAVENOUS CONTINUOUS
Status: DISCONTINUED | OUTPATIENT
Start: 2022-03-22 | End: 2022-03-22 | Stop reason: HOSPADM

## 2022-03-22 RX ORDER — HEPARIN SODIUM (PORCINE) LOCK FLUSH IV SOLN 100 UNIT/ML 100 UNIT/ML
500 SOLUTION INTRAVENOUS PRN
Status: CANCELLED | OUTPATIENT
Start: 2022-03-22

## 2022-03-22 RX ORDER — ACETAMINOPHEN 325 MG/1
650 TABLET ORAL ONCE
Status: CANCELLED | OUTPATIENT
Start: 2022-03-22

## 2022-03-22 RX ORDER — 0.9 % SODIUM CHLORIDE 0.9 %
3 VIAL (ML) INJECTION PRN
Status: CANCELLED | OUTPATIENT
Start: 2022-03-22

## 2022-03-22 RX ADMIN — ACETAMINOPHEN 650 MG: 325 TABLET ORAL at 08:51

## 2022-03-22 RX ADMIN — DIPHENHYDRAMINE HYDROCHLORIDE 25 MG: 50 INJECTION, SOLUTION INTRAMUSCULAR; INTRAVENOUS at 08:56

## 2022-03-22 ASSESSMENT — FIBROSIS 4 INDEX: FIB4 SCORE: 0.57

## 2022-03-22 NOTE — PROGRESS NOTES
Lily presented to [facility] today for a blood transfusion.  On 3/20/22 her Hgb was 7.6, meeting parameters for 1 unit PRBC. COD was done on 3/20/22.  PIV placed, flushed easily & mikey briskly. Pre-meds given. Blood bank notified this RN that the blood products had not been ordered and were not in house. Lily waited 90 minutes, then had to leave to go to her dialysis appointment. PIV was removed, gauze and coban dressing to site. Blood transfusion was re-scheduled for Saturday. After she left, blood bank called to say that the COD will be  on Saturday. Lily will return later today to have the COD drawn.

## 2022-03-22 NOTE — PROGRESS NOTES
Pt to [facility] for COD re-draw.  Pt ws unable to receive her blood today and needs new COD for transfusion appt on Thursday.  Pt w/ no s/s of infection, pt has no complaints at this time.  Labs drawn from left wrist via 23G butterfly.  Gauze and coban dressing applied.  Pt left on foot in NAD.  Pt already has appt for 3-24.  BB communication sent to  for pt's appt on 3-24-22.  Therapy plan day left open for visit on 3-24

## 2022-03-23 RX ORDER — 0.9 % SODIUM CHLORIDE 0.9 %
10 VIAL (ML) INJECTION PRN
Status: CANCELLED | OUTPATIENT
Start: 2022-03-23

## 2022-03-23 RX ORDER — SODIUM CHLORIDE 9 MG/ML
INJECTION, SOLUTION INTRAVENOUS CONTINUOUS
Status: CANCELLED | OUTPATIENT
Start: 2022-03-23

## 2022-03-23 RX ORDER — 0.9 % SODIUM CHLORIDE 0.9 %
VIAL (ML) INJECTION PRN
Status: CANCELLED | OUTPATIENT
Start: 2022-03-23

## 2022-03-23 RX ORDER — ACETAMINOPHEN 325 MG/1
650 TABLET ORAL PRN
Status: CANCELLED | OUTPATIENT
Start: 2022-03-23

## 2022-03-23 RX ORDER — DIPHENHYDRAMINE HCL 25 MG
25 TABLET ORAL ONCE
Status: CANCELLED | OUTPATIENT
Start: 2022-03-23 | End: 2022-03-23

## 2022-03-23 RX ORDER — HEPARIN SODIUM (PORCINE) LOCK FLUSH IV SOLN 100 UNIT/ML 100 UNIT/ML
500 SOLUTION INTRAVENOUS PRN
Status: CANCELLED | OUTPATIENT
Start: 2022-03-23

## 2022-03-23 RX ORDER — 0.9 % SODIUM CHLORIDE 0.9 %
3 VIAL (ML) INJECTION PRN
Status: CANCELLED | OUTPATIENT
Start: 2022-03-23

## 2022-03-23 RX ORDER — DIPHENHYDRAMINE HYDROCHLORIDE 50 MG/ML
25 INJECTION INTRAMUSCULAR; INTRAVENOUS PRN
Status: CANCELLED | OUTPATIENT
Start: 2022-03-23

## 2022-03-23 RX ORDER — ACETAMINOPHEN 325 MG/1
650 TABLET ORAL ONCE
Status: CANCELLED | OUTPATIENT
Start: 2022-03-23

## 2022-03-24 ENCOUNTER — OUTPATIENT INFUSION SERVICES (OUTPATIENT)
Dept: ONCOLOGY | Facility: MEDICAL CENTER | Age: 27
End: 2022-03-24
Attending: INTERNAL MEDICINE
Payer: COMMERCIAL

## 2022-03-24 VITALS
RESPIRATION RATE: 14 BRPM | SYSTOLIC BLOOD PRESSURE: 135 MMHG | HEART RATE: 87 BPM | DIASTOLIC BLOOD PRESSURE: 97 MMHG | TEMPERATURE: 97.8 F | BODY MASS INDEX: 18.32 KG/M2 | HEIGHT: 66 IN | WEIGHT: 113.98 LBS | OXYGEN SATURATION: 96 %

## 2022-03-24 DIAGNOSIS — D64.9 SYMPTOMATIC ANEMIA: ICD-10-CM

## 2022-03-24 PROCEDURE — 86945 BLOOD PRODUCT/IRRADIATION: CPT

## 2022-03-24 PROCEDURE — 306780 HCHG STAT FOR TRANSFUSION PER CASE

## 2022-03-24 PROCEDURE — 700102 HCHG RX REV CODE 250 W/ 637 OVERRIDE(OP): Performed by: INTERNAL MEDICINE

## 2022-03-24 PROCEDURE — 700105 HCHG RX REV CODE 258: Performed by: INTERNAL MEDICINE

## 2022-03-24 PROCEDURE — 36430 TRANSFUSION BLD/BLD COMPNT: CPT

## 2022-03-24 PROCEDURE — 700111 HCHG RX REV CODE 636 W/ 250 OVERRIDE (IP): Performed by: INTERNAL MEDICINE

## 2022-03-24 PROCEDURE — A9270 NON-COVERED ITEM OR SERVICE: HCPCS | Performed by: INTERNAL MEDICINE

## 2022-03-24 PROCEDURE — 86922 COMPATIBILITY TEST ANTIGLOB: CPT

## 2022-03-24 PROCEDURE — P9016 RBC LEUKOCYTES REDUCED: HCPCS

## 2022-03-24 PROCEDURE — 96374 THER/PROPH/DIAG INJ IV PUSH: CPT

## 2022-03-24 RX ORDER — ACETAMINOPHEN 325 MG/1
650 TABLET ORAL PRN
Status: CANCELLED | OUTPATIENT
Start: 2022-03-24

## 2022-03-24 RX ORDER — ACETAMINOPHEN 325 MG/1
650 TABLET ORAL ONCE
Status: CANCELLED | OUTPATIENT
Start: 2022-03-24

## 2022-03-24 RX ORDER — DIPHENHYDRAMINE HYDROCHLORIDE 50 MG/ML
25 INJECTION INTRAMUSCULAR; INTRAVENOUS PRN
Status: CANCELLED | OUTPATIENT
Start: 2022-03-24

## 2022-03-24 RX ORDER — HEPARIN SODIUM (PORCINE) LOCK FLUSH IV SOLN 100 UNIT/ML 100 UNIT/ML
500 SOLUTION INTRAVENOUS PRN
Status: CANCELLED | OUTPATIENT
Start: 2022-03-24

## 2022-03-24 RX ORDER — 0.9 % SODIUM CHLORIDE 0.9 %
3 VIAL (ML) INJECTION PRN
Status: CANCELLED | OUTPATIENT
Start: 2022-03-24

## 2022-03-24 RX ORDER — DIPHENHYDRAMINE HYDROCHLORIDE 50 MG/ML
25 INJECTION INTRAMUSCULAR; INTRAVENOUS PRN
Status: CANCELLED
Start: 2022-03-24

## 2022-03-24 RX ORDER — DIPHENHYDRAMINE HCL 25 MG
25 TABLET ORAL ONCE
Status: CANCELLED | OUTPATIENT
Start: 2022-03-24 | End: 2022-03-24

## 2022-03-24 RX ORDER — 0.9 % SODIUM CHLORIDE 0.9 %
10 VIAL (ML) INJECTION PRN
Status: CANCELLED | OUTPATIENT
Start: 2022-03-24

## 2022-03-24 RX ORDER — SODIUM CHLORIDE 9 MG/ML
INJECTION, SOLUTION INTRAVENOUS CONTINUOUS
Status: CANCELLED | OUTPATIENT
Start: 2022-03-24

## 2022-03-24 RX ORDER — ACETAMINOPHEN 325 MG/1
650 TABLET ORAL ONCE
Status: COMPLETED | OUTPATIENT
Start: 2022-03-24 | End: 2022-03-24

## 2022-03-24 RX ORDER — 0.9 % SODIUM CHLORIDE 0.9 %
VIAL (ML) INJECTION PRN
Status: CANCELLED | OUTPATIENT
Start: 2022-03-24

## 2022-03-24 RX ADMIN — DIPHENHYDRAMINE HYDROCHLORIDE 25 MG: 50 INJECTION, SOLUTION INTRAMUSCULAR; INTRAVENOUS at 08:57

## 2022-03-24 RX ADMIN — ACETAMINOPHEN 650 MG: 325 TABLET ORAL at 08:54

## 2022-03-24 ASSESSMENT — PAIN DESCRIPTION - PAIN TYPE: TYPE: CHRONIC PAIN

## 2022-03-24 ASSESSMENT — FIBROSIS 4 INDEX: FIB4 SCORE: 0.57

## 2022-03-24 NOTE — PROGRESS NOTES
Pt ambulatory on O2 for 1 unit PRBCs.  Pt had CBC and COD drawn on prior days.  Pt's Hgb 7.6 meets parameters for 1 unit PRBC.  Pt w/ no s/s of infection, pt reporting 9/10 back pain today, unable to take pain meds d/t ESRD.  Pt given heat, pillow and blanket for comfort, repositioned in Infusion chair for comfort.  Pre-meds administered per orders.  1 unit PRBC transfused w/ no AE.  PIV flushed and removed.  Pt left on foot in care of her mother in NAD.  Confirmed pt's next appt.

## 2022-04-01 ENCOUNTER — APPOINTMENT (OUTPATIENT)
Dept: RADIOLOGY | Facility: MEDICAL CENTER | Age: 27
End: 2022-04-01
Attending: EMERGENCY MEDICINE
Payer: COMMERCIAL

## 2022-04-01 ENCOUNTER — HOSPITAL ENCOUNTER (EMERGENCY)
Facility: MEDICAL CENTER | Age: 27
End: 2022-04-01
Attending: EMERGENCY MEDICINE
Payer: COMMERCIAL

## 2022-04-01 VITALS
DIASTOLIC BLOOD PRESSURE: 78 MMHG | RESPIRATION RATE: 18 BRPM | TEMPERATURE: 98.5 F | OXYGEN SATURATION: 98 % | BODY MASS INDEX: 18.48 KG/M2 | WEIGHT: 115 LBS | HEART RATE: 82 BPM | SYSTOLIC BLOOD PRESSURE: 135 MMHG

## 2022-04-01 DIAGNOSIS — M54.50 ACUTE MIDLINE LOW BACK PAIN WITHOUT SCIATICA: ICD-10-CM

## 2022-04-01 LAB
ALBUMIN SERPL BCP-MCNC: 3 G/DL (ref 3.2–4.9)
ALBUMIN/GLOB SERPL: 0.4 G/DL
ALP SERPL-CCNC: 94 U/L (ref 30–99)
ALT SERPL-CCNC: 8 U/L (ref 2–50)
ANION GAP SERPL CALC-SCNC: 12 MMOL/L (ref 7–16)
ANISOCYTOSIS BLD QL SMEAR: ABNORMAL
AST SERPL-CCNC: 22 U/L (ref 12–45)
BASOPHILS # BLD AUTO: 0.6 % (ref 0–1.8)
BASOPHILS # BLD: 0.04 K/UL (ref 0–0.12)
BILIRUB SERPL-MCNC: 0.5 MG/DL (ref 0.1–1.5)
BUN SERPL-MCNC: 8 MG/DL (ref 8–22)
CALCIUM SERPL-MCNC: 8.8 MG/DL (ref 8.4–10.2)
CHLORIDE SERPL-SCNC: 88 MMOL/L (ref 96–112)
CO2 SERPL-SCNC: 33 MMOL/L (ref 20–33)
COMMENT 1642: NORMAL
CREAT SERPL-MCNC: 2.5 MG/DL (ref 0.5–1.4)
EOSINOPHIL # BLD AUTO: 0.04 K/UL (ref 0–0.51)
EOSINOPHIL NFR BLD: 0.6 % (ref 0–6.9)
ERYTHROCYTE [DISTWIDTH] IN BLOOD BY AUTOMATED COUNT: 57.4 FL (ref 35.9–50)
GFR SERPLBLD CREATININE-BSD FMLA CKD-EPI: 27 ML/MIN/1.73 M 2
GLOBULIN SER CALC-MCNC: 7.2 G/DL (ref 1.9–3.5)
GLUCOSE SERPL-MCNC: 94 MG/DL (ref 65–99)
HCT VFR BLD AUTO: 35.4 % (ref 37–47)
HGB BLD-MCNC: 10.2 G/DL (ref 12–16)
HYPOCHROMIA BLD QL SMEAR: ABNORMAL
IMM GRANULOCYTES # BLD AUTO: 0.11 K/UL (ref 0–0.11)
IMM GRANULOCYTES NFR BLD AUTO: 1.7 % (ref 0–0.9)
LYMPHOCYTES # BLD AUTO: 0.98 K/UL (ref 1–4.8)
LYMPHOCYTES NFR BLD: 15.4 % (ref 22–41)
MACROCYTES BLD QL SMEAR: ABNORMAL
MCH RBC QN AUTO: 25.2 PG (ref 27–33)
MCHC RBC AUTO-ENTMCNC: 28.8 G/DL (ref 33.6–35)
MCV RBC AUTO: 87.4 FL (ref 81.4–97.8)
MONOCYTES # BLD AUTO: 0.32 K/UL (ref 0–0.85)
MONOCYTES NFR BLD AUTO: 5 % (ref 0–13.4)
NEUTROPHILS # BLD AUTO: 4.89 K/UL (ref 2–7.15)
NEUTROPHILS NFR BLD: 76.7 % (ref 44–72)
NRBC # BLD AUTO: 0 K/UL
NRBC BLD-RTO: 0 /100 WBC
PLATELET # BLD AUTO: 296 K/UL (ref 164–446)
PLATELET BLD QL SMEAR: NORMAL
PMV BLD AUTO: 9.7 FL (ref 9–12.9)
POLYCHROMASIA BLD QL SMEAR: NORMAL
POTASSIUM SERPL-SCNC: 3.6 MMOL/L (ref 3.6–5.5)
PROT SERPL-MCNC: 10.2 G/DL (ref 6–8.2)
RBC # BLD AUTO: 4.05 M/UL (ref 4.2–5.4)
RBC BLD AUTO: PRESENT
SODIUM SERPL-SCNC: 133 MMOL/L (ref 135–145)
WBC # BLD AUTO: 6.4 K/UL (ref 4.8–10.8)

## 2022-04-01 PROCEDURE — 700111 HCHG RX REV CODE 636 W/ 250 OVERRIDE (IP): Performed by: EMERGENCY MEDICINE

## 2022-04-01 PROCEDURE — 85025 COMPLETE CBC W/AUTO DIFF WBC: CPT

## 2022-04-01 PROCEDURE — 96374 THER/PROPH/DIAG INJ IV PUSH: CPT

## 2022-04-01 PROCEDURE — 96375 TX/PRO/DX INJ NEW DRUG ADDON: CPT

## 2022-04-01 PROCEDURE — 36415 COLL VENOUS BLD VENIPUNCTURE: CPT

## 2022-04-01 PROCEDURE — 72100 X-RAY EXAM L-S SPINE 2/3 VWS: CPT

## 2022-04-01 PROCEDURE — 80053 COMPREHEN METABOLIC PANEL: CPT

## 2022-04-01 PROCEDURE — 99284 EMERGENCY DEPT VISIT MOD MDM: CPT

## 2022-04-01 RX ORDER — ONDANSETRON 2 MG/ML
4 INJECTION INTRAMUSCULAR; INTRAVENOUS ONCE
Status: COMPLETED | OUTPATIENT
Start: 2022-04-01 | End: 2022-04-01

## 2022-04-01 RX ORDER — HYDROMORPHONE HYDROCHLORIDE 1 MG/ML
1 INJECTION, SOLUTION INTRAMUSCULAR; INTRAVENOUS; SUBCUTANEOUS ONCE
Status: COMPLETED | OUTPATIENT
Start: 2022-04-01 | End: 2022-04-01

## 2022-04-01 RX ORDER — ONDANSETRON 4 MG/1
4 TABLET, ORALLY DISINTEGRATING ORAL EVERY 6 HOURS PRN
Status: SHIPPED | COMMUNITY
End: 2023-01-01

## 2022-04-01 RX ORDER — ACETAMINOPHEN 500 MG
500-1000 TABLET ORAL EVERY 6 HOURS PRN
Status: ON HOLD | COMMUNITY
End: 2022-07-11

## 2022-04-01 RX ADMIN — ONDANSETRON 4 MG: 2 INJECTION INTRAMUSCULAR; INTRAVENOUS at 13:46

## 2022-04-01 RX ADMIN — HYDROMORPHONE HYDROCHLORIDE 1 MG: 1 INJECTION, SOLUTION INTRAMUSCULAR; INTRAVENOUS; SUBCUTANEOUS at 13:46

## 2022-04-01 ASSESSMENT — FIBROSIS 4 INDEX: FIB4 SCORE: 0.57

## 2022-04-01 NOTE — ED NOTES
ERP at bedside. Pt agrees with plan of care discussed by ERP. AIDET acknowledged with patient. IV established. Blood sent to lab. Medicinal interventions carried out per ERP orders.  Carsonrrogelio in low position, side rail up for pt safety. Call light within reach. Will continue to monitor.

## 2022-04-01 NOTE — ED PROVIDER NOTES
ED Provider Note    Scribed for Niecy Sandoval M.D. by Omero Ruff. 4/1/2022  1:35 PM    Primary care provider: Ella Matthew M.D.  Means of arrival: EMS  History obtained from: Patient  History limited by: None    CHIEF COMPLAINT  Chief Complaint   Patient presents with    Back Pain       HPI  Lily Nciole is a 26 y.o. female with a history of lupus who presents to the Emergency Department via EMS for severe lower back pain onset 1 hour ago. The patient describes pain wrapping around both sides of her back to the middle. She states she has chronic back pain for which she is not on medication, however she is supposed to start receiving injections for pain next week. She denies dysuria, fever, chills, numbness, falls, or trauma. No exacerbating or alleviating factors were noted. She denies any numbness in her groin weakness in the legs or loss of bowel control    REVIEW OF SYSTEMS  : back pain, no dysuria  Neuro: no numbness  Musculoskeletal: no falls or trauma.   Endocrine: no fevers or chills     See history of present illness. All other systems are negative. C.    PAST MEDICAL HISTORY   has a past medical history of Anemia (01/17/2018), AVF (arteriovenous fistula) ([facility]), Chest pain, Chest tightness, Daytime sleepiness, Dialysis patient ([facility]), Difficulty breathing, ESRD (end stage renal disease) on dialysis ([facility]) (01/17/2018), Gasping for breath, Heart burn, Hypertension (01/17/2018), Indigestion, Lupus ([facility]CC), Migraines (01/17/2018), Painful breathing, Palpitations, Seizure ([facility]) (2013), Shortness of breath, Swelling of lower extremity, and Wheezing.    SURGICAL HISTORY   has a past surgical history that includes ronak by laparoscopy (4/5/2010); av fistula creation (Right); angioplasty (01/17/2018); other; other; gastroscopy-endo (12/9/2019); gastroscopy (N/A, 5/30/2020); gastroscopy-endo (9/18/2020); upper gi endoscopy,ctrl bleed (11/12/2020); upper gi endoscopy,biopsy (11/12/2020);  gastroscopy-endo (11/12/2020); colonoscopy,diagnostic (1/8/2021); upper gi endoscopy,diagnosis (1/8/2021); upper gi endoscopy,ctrl bleed (1/8/2021); upper gi endoscopy,diagnosis (3/5/2021); upper gi endoscopy,diagnosis (N/A, 3/19/2021); upper gi endoscopy,ctrl bleed (3/19/2021); and bronchoscopy,diagnostic (Bilateral, 5/13/2021).    SOCIAL HISTORY  Social History     Tobacco Use    Smoking status: Never Smoker    Smokeless tobacco: Never Used   Vaping Use    Vaping Use: Never used   Substance Use Topics    Alcohol use: No    Drug use: No      Social History     Substance and Sexual Activity   Drug Use No       FAMILY HISTORY  Family History   Problem Relation Age of Onset    Diabetes Paternal Grandmother        CURRENT MEDICATIONS  Current Outpatient Medications   Medication Instructions    amLODIPine (NORVASC) 5 mg, Oral, DAILY    atenolol (TENORMIN) 25 mg, Oral, DAILY    cloNIDine (CATAPRES) 0.2 MG/24HR PATCH WEEKLY patch 1 Patch, Transdermal, EVERY TUESDAY    hydroxychloroquine (PLAQUENIL) 200 mg, Oral, DAILY    mycophenolate sodium (MYFORTIC) 360 mg, Oral, EVERY EVENING    pantoprazole (PROTONIX) 40 mg, Oral, 2 TIMES DAILY    predniSONE (DELTASONE) 5 mg, Oral, EVERY EVENING     ALLERGIES  Allergies   Allergen Reactions    Cephalexin Rash     Nausea and rash   Hives  .    Clindamycin Rash     Nausea and rash     Hive  .    Methylprednisolone      Anxious  Other reaction(s): Other-Reaction in Comments  Anxious    Metoprolol Rash and Nausea     Nausea and rash     .    Compazine      C/o anxiety    Fentanyl And Related Anxiety    Hydrocodone-Acetaminophen Rash and Nausea     Generalized rash  Generalized rash    Maxipime [Cefepime] Itching    Reglan [Metoclopramide] Anxiety    Tape Rash     Paper tape is ok       PHYSICAL EXAM  VITAL SIGNS: BP (!) 195/126   Temp 36.9 °C (98.5 °F) (Temporal)   Resp 20   Wt 52.2 kg (115 lb)   BMI 18.48 kg/m²     Constitutional: Well developed, Well nourished, Uncomfortable  appearing, Non-toxic appearance.   HEENT: Normocephalic, Atraumatic,  external ears normal, pharynx pink,  Mucous  Membranes moist, No rhinorrhea or mucosal edema  Eyes: PERRL, EOMI, Conjunctiva normal, No discharge.   Neck: Normal range of motion, No tenderness, Supple, No stridor.   Lymphatic: No lymphadenopathy    Cardiovascular: Tachycardic Rate and Rhythm, No murmurs,  rubs, or gallops.   Thorax & Lungs: Lungs clear to auscultation bilaterally, No respiratory distress, No wheezes, rhales or rhonchi, No chest wall tenderness.   Abdomen: Bowel sounds normal, Soft, non tender, non distended,  No pulsatile masses., no rebound guarding or peritoneal signs.   Skin: Warm, Dry, No erythema, No rash,   Back:  Acute lower back pain across the l-spine with mild spinal tenderness to palpation, No CVA tenderness,  No bony crepitance, step offs, or instability.   Neurologic: Alert & oriented clear speech no focal deficits  Extremities: Equal strength and sensation in bilateral lower extremities, intact distal pulses, No cyanosis, clubbing or edema,  No tenderness.   Musculoskeletal: Good range of motion in all major joints. No tenderness to palpation or major deformities noted.       DIAGNOSTIC STUDIES / PROCEDURES    LABS  Results for orders placed or performed during the hospital encounter of 04/01/22   CBC WITH DIFFERENTIAL   Result Value Ref Range    WBC 6.4 4.8 - 10.8 K/uL    RBC 4.05 (L) 4.20 - 5.40 M/uL    Hemoglobin 10.2 (L) 12.0 - 16.0 g/dL    Hematocrit 35.4 (L) 37.0 - 47.0 %    MCV 87.4 81.4 - 97.8 fL    MCH 25.2 (L) 27.0 - 33.0 pg    MCHC 28.8 (L) 33.6 - 35.0 g/dL    RDW 57.4 (H) 35.9 - 50.0 fL    Platelet Count 296 164 - 446 K/uL    MPV 9.7 9.0 - 12.9 fL    Neutrophils-Polys 76.70 (H) 44.00 - 72.00 %    Lymphocytes 15.40 (L) 22.00 - 41.00 %    Monocytes 5.00 0.00 - 13.40 %    Eosinophils 0.60 0.00 - 6.90 %    Basophils 0.60 0.00 - 1.80 %    Immature Granulocytes 1.70 (H) 0.00 - 0.90 %    Nucleated RBC 0.00 /100  WBC    Neutrophils (Absolute) 4.89 2.00 - 7.15 K/uL    Lymphs (Absolute) 0.98 (L) 1.00 - 4.80 K/uL    Monos (Absolute) 0.32 0.00 - 0.85 K/uL    Eos (Absolute) 0.04 0.00 - 0.51 K/uL    Baso (Absolute) 0.04 0.00 - 0.12 K/uL    Immature Granulocytes (abs) 0.11 0.00 - 0.11 K/uL    NRBC (Absolute) 0.00 K/uL    Hypochromia 1+     Anisocytosis 1+     Macrocytosis 1+    Comp Metabolic Panel   Result Value Ref Range    Sodium 133 (L) 135 - 145 mmol/L    Potassium 3.6 3.6 - 5.5 mmol/L    Chloride 88 (L) 96 - 112 mmol/L    Co2 33 20 - 33 mmol/L    Anion Gap 12.0 7.0 - 16.0    Glucose 94 65 - 99 mg/dL    Bun 8 8 - 22 mg/dL    Creatinine 2.50 (H) 0.50 - 1.40 mg/dL    Calcium 8.8 8.4 - 10.2 mg/dL    AST(SGOT) 22 12 - 45 U/L    ALT(SGPT) 8 2 - 50 U/L    Alkaline Phosphatase 94 30 - 99 U/L    Total Bilirubin 0.5 0.1 - 1.5 mg/dL    Albumin 3.0 (L) 3.2 - 4.9 g/dL    Total Protein 10.2 (H) 6.0 - 8.2 g/dL    Globulin 7.2 (H) 1.9 - 3.5 g/dL    A-G Ratio 0.4 g/dL   ESTIMATED GFR   Result Value Ref Range    GFR (CKD-EPI) 27 (A) >60 mL/min/1.73 m 2   PLATELET ESTIMATE   Result Value Ref Range    Plt Estimation Normal    MORPHOLOGY   Result Value Ref Range    RBC Morphology Present     Polychromia 1+    DIFFERENTIAL COMMENT   Result Value Ref Range    Comments-Diff see below        All labs reviewed by me.      RADIOLOGY  DX-LUMBAR SPINE-2 OR 3 VIEWS   Final Result      Mild facet degeneration L4-5 and L5-S1. *Numbering      No evidence of fracture.        The radiologist's interpretation of all radiological studies have been reviewed by me.    COURSE & MEDICAL DECISION MAKING  Nursing notes, VS, PMSFHx reviewed in chart.    Review of past medical records reveal a complicated medical history of ESRD and lupus.     1:35 PM Patient seen and examined at bedside. Patient will be treated with Dilaudid 1 mg and Zofran 4 mg. Ordered DX lumbar spine, CBC w/diff, and CMP to evaluate her symptoms. The differential diagnoses include but are not  limited to: lupus flare, muscular strain.     4:05 PM Patient re-evaluated at bedside. Patient appears improved at this time and she reports feeling no persistent pain. Discussed the patient's condition and treatment plan, including my plans for discharge. Patient's labs and radiology results discussed. Reassured no acute abnormalities were found on her x-ray and that labs are currently stable. Advised follow up with her PCP. Gave further discharge instructions and return precautions.The patient understood and is comfortable with discharge. At this time, the patient was given the opportunity to ask questions.       The patient will return for new or worsening symptoms and is stable at the time of discharge.    The patient is referred to a primary physician for blood pressure management, diabetic screening, and for all other preventative health concerns.    DISPOSITION:  Patient will be discharged home in stable condition.    FOLLOW UP:  Ella Matthew M.D.  [address] [place] [id number]  [phone]    Call in 1 day  for recheck    FINAL IMPRESSION  1. Acute midline low back pain without sciatica          Omero PARRA (Gabrielle), am scribing for, and in the presence of, Neicy Sandoval M.D..    Electronically signed by: Omero Ruff (Gabrielle), 4/1/2022    Niecy PARRA M.D. personally performed the services described in this documentation, as scribed by Omero Ruff in my presence, and it is both accurate and complete.    C    The note accurately reflects work and decisions made by me.  Niecy Sandoval M.D.  4/1/2022  5:27 PM

## 2022-04-01 NOTE — ED NOTES
"Pt BIB [facility] with c/o bilat low back pain after dialysis today. Pt states this has been happening and she has plans to get \"shots\" next week. Pt unsure of wha tthe shot is. Pt was given 10mg of IV morphine pta and 4 mg of zofran  "

## 2022-04-03 ENCOUNTER — OUTPATIENT INFUSION SERVICES (OUTPATIENT)
Dept: ONCOLOGY | Facility: MEDICAL CENTER | Age: 27
End: 2022-04-03
Attending: INTERNAL MEDICINE
Payer: COMMERCIAL

## 2022-04-03 VITALS
DIASTOLIC BLOOD PRESSURE: 92 MMHG | SYSTOLIC BLOOD PRESSURE: 139 MMHG | HEART RATE: 74 BPM | RESPIRATION RATE: 16 BRPM | OXYGEN SATURATION: 100 % | TEMPERATURE: 97.1 F

## 2022-04-03 DIAGNOSIS — D64.9 SYMPTOMATIC ANEMIA: ICD-10-CM

## 2022-04-03 LAB
ABO GROUP BLD: NORMAL
ANISOCYTOSIS BLD QL SMEAR: ABNORMAL
BASOPHILS # BLD AUTO: 0.9 % (ref 0–1.8)
BASOPHILS # BLD: 0.05 K/UL (ref 0–0.12)
BLD GP AB SCN SERPL QL: NORMAL
EOSINOPHIL # BLD AUTO: 0.13 K/UL (ref 0–0.51)
EOSINOPHIL NFR BLD: 2.6 % (ref 0–6.9)
ERYTHROCYTE [DISTWIDTH] IN BLOOD BY AUTOMATED COUNT: 58.6 FL (ref 35.9–50)
HCT VFR BLD AUTO: 30.1 % (ref 37–47)
HGB BLD-MCNC: 8.7 G/DL (ref 12–16)
LYMPHOCYTES # BLD AUTO: 0.52 K/UL (ref 1–4.8)
LYMPHOCYTES NFR BLD: 10.3 % (ref 22–41)
MANUAL DIFF BLD: NORMAL
MCH RBC QN AUTO: 25 PG (ref 27–33)
MCHC RBC AUTO-ENTMCNC: 28.9 G/DL (ref 33.6–35)
MCV RBC AUTO: 86.5 FL (ref 81.4–97.8)
MONOCYTES # BLD AUTO: 0.39 K/UL (ref 0–0.85)
MONOCYTES NFR BLD AUTO: 7.7 % (ref 0–13.4)
MORPHOLOGY BLD-IMP: NORMAL
NEUTROPHILS # BLD AUTO: 3.93 K/UL (ref 2–7.15)
NEUTROPHILS NFR BLD: 75.9 % (ref 44–72)
NEUTS BAND NFR BLD MANUAL: 2.6 % (ref 0–10)
NRBC # BLD AUTO: 0 K/UL
NRBC BLD-RTO: 0 /100 WBC
PLATELET # BLD AUTO: 237 K/UL (ref 164–446)
PLATELET BLD QL SMEAR: NORMAL
PMV BLD AUTO: 9.4 FL (ref 9–12.9)
RBC # BLD AUTO: 3.48 M/UL (ref 4.2–5.4)
RBC BLD AUTO: PRESENT
RH BLD: NORMAL
WBC # BLD AUTO: 5 K/UL (ref 4.8–10.8)

## 2022-04-03 PROCEDURE — 85007 BL SMEAR W/DIFF WBC COUNT: CPT

## 2022-04-03 PROCEDURE — 86901 BLOOD TYPING SEROLOGIC RH(D): CPT

## 2022-04-03 PROCEDURE — 85025 COMPLETE CBC W/AUTO DIFF WBC: CPT

## 2022-04-03 PROCEDURE — 86900 BLOOD TYPING SEROLOGIC ABO: CPT

## 2022-04-03 PROCEDURE — 36415 COLL VENOUS BLD VENIPUNCTURE: CPT

## 2022-04-03 PROCEDURE — 86850 RBC ANTIBODY SCREEN: CPT

## 2022-04-03 RX ORDER — 0.9 % SODIUM CHLORIDE 0.9 %
10 VIAL (ML) INJECTION PRN
Status: CANCELLED | OUTPATIENT
Start: 2022-04-03

## 2022-04-03 RX ORDER — 0.9 % SODIUM CHLORIDE 0.9 %
VIAL (ML) INJECTION PRN
Status: CANCELLED | OUTPATIENT
Start: 2022-04-03

## 2022-04-03 RX ORDER — DIPHENHYDRAMINE HYDROCHLORIDE 50 MG/ML
25 INJECTION INTRAMUSCULAR; INTRAVENOUS PRN
Status: CANCELLED
Start: 2022-04-03

## 2022-04-03 RX ORDER — DIPHENHYDRAMINE HCL 25 MG
25 TABLET ORAL ONCE
Status: CANCELLED | OUTPATIENT
Start: 2022-04-03 | End: 2022-04-03

## 2022-04-03 RX ORDER — ACETAMINOPHEN 325 MG/1
650 TABLET ORAL PRN
Status: CANCELLED | OUTPATIENT
Start: 2022-04-03

## 2022-04-03 RX ORDER — SODIUM CHLORIDE 9 MG/ML
INJECTION, SOLUTION INTRAVENOUS CONTINUOUS
Status: CANCELLED | OUTPATIENT
Start: 2022-04-03

## 2022-04-03 RX ORDER — 0.9 % SODIUM CHLORIDE 0.9 %
3 VIAL (ML) INJECTION PRN
Status: CANCELLED | OUTPATIENT
Start: 2022-04-03

## 2022-04-03 RX ORDER — ACETAMINOPHEN 325 MG/1
650 TABLET ORAL ONCE
Status: CANCELLED | OUTPATIENT
Start: 2022-04-03

## 2022-04-03 RX ORDER — DIPHENHYDRAMINE HYDROCHLORIDE 50 MG/ML
25 INJECTION INTRAMUSCULAR; INTRAVENOUS PRN
Status: CANCELLED | OUTPATIENT
Start: 2022-04-03

## 2022-04-03 RX ORDER — HEPARIN SODIUM (PORCINE) LOCK FLUSH IV SOLN 100 UNIT/ML 100 UNIT/ML
500 SOLUTION INTRAVENOUS PRN
Status: CANCELLED | OUTPATIENT
Start: 2022-04-03

## 2022-04-03 ASSESSMENT — PAIN DESCRIPTION - PAIN TYPE: TYPE: CHRONIC PAIN

## 2022-04-03 NOTE — PROGRESS NOTES
Pt to [facility] for CBC, COD labs.  Pt w/ no s/s of infection, pt has no complaints at this time.  Labs drawn from LAC via 23G butterfly.  Gauze and coban dressing applied.  Pt left on foot in NAD.  Pt already has appt for 4-5-22.

## 2022-04-05 ENCOUNTER — APPOINTMENT (OUTPATIENT)
Dept: ONCOLOGY | Facility: MEDICAL CENTER | Age: 27
End: 2022-04-05
Attending: INTERNAL MEDICINE
Payer: COMMERCIAL

## 2022-04-06 ENCOUNTER — APPOINTMENT (OUTPATIENT)
Dept: RADIOLOGY | Facility: MEDICAL CENTER | Age: 27
End: 2022-04-06
Attending: STUDENT IN AN ORGANIZED HEALTH CARE EDUCATION/TRAINING PROGRAM
Payer: COMMERCIAL

## 2022-04-06 ENCOUNTER — HOSPITAL ENCOUNTER (OUTPATIENT)
Facility: MEDICAL CENTER | Age: 27
End: 2022-04-08
Attending: STUDENT IN AN ORGANIZED HEALTH CARE EDUCATION/TRAINING PROGRAM | Admitting: HOSPITALIST
Payer: COMMERCIAL

## 2022-04-06 DIAGNOSIS — R07.9 CHEST PAIN, UNSPECIFIED TYPE: ICD-10-CM

## 2022-04-06 DIAGNOSIS — R51.9 NONINTRACTABLE HEADACHE, UNSPECIFIED CHRONICITY PATTERN, UNSPECIFIED HEADACHE TYPE: ICD-10-CM

## 2022-04-06 DIAGNOSIS — R79.89 ELEVATED TROPONIN: ICD-10-CM

## 2022-04-06 DIAGNOSIS — M32.9 LUPUS (HCC): ICD-10-CM

## 2022-04-06 DIAGNOSIS — J96.01 ACUTE RESPIRATORY FAILURE WITH HYPOXIA (HCC): ICD-10-CM

## 2022-04-06 DIAGNOSIS — N18.6 END STAGE RENAL DISEASE (HCC): ICD-10-CM

## 2022-04-06 LAB
ALBUMIN SERPL BCP-MCNC: 3.3 G/DL (ref 3.2–4.9)
ALBUMIN/GLOB SERPL: 0.5 G/DL
ALP SERPL-CCNC: 93 U/L (ref 30–99)
ALT SERPL-CCNC: 9 U/L (ref 2–50)
ANION GAP SERPL CALC-SCNC: 13 MMOL/L (ref 7–16)
ANISOCYTOSIS BLD QL SMEAR: ABNORMAL
AST SERPL-CCNC: 25 U/L (ref 12–45)
BASOPHILS # BLD AUTO: 0.8 % (ref 0–1.8)
BASOPHILS # BLD: 0.04 K/UL (ref 0–0.12)
BILIRUB SERPL-MCNC: 0.4 MG/DL (ref 0.1–1.5)
BUN SERPL-MCNC: 18 MG/DL (ref 8–22)
CALCIUM SERPL-MCNC: 8.9 MG/DL (ref 8.4–10.2)
CHLORIDE SERPL-SCNC: 85 MMOL/L (ref 96–112)
CO2 SERPL-SCNC: 33 MMOL/L (ref 20–33)
COMMENT 1642: NORMAL
CREAT SERPL-MCNC: 3.75 MG/DL (ref 0.5–1.4)
EOSINOPHIL # BLD AUTO: 0.01 K/UL (ref 0–0.51)
EOSINOPHIL NFR BLD: 0.2 % (ref 0–6.9)
ERYTHROCYTE [DISTWIDTH] IN BLOOD BY AUTOMATED COUNT: 58.7 FL (ref 35.9–50)
GFR SERPLBLD CREATININE-BSD FMLA CKD-EPI: 16 ML/MIN/1.73 M 2
GLOBULIN SER CALC-MCNC: 7.3 G/DL (ref 1.9–3.5)
GLUCOSE SERPL-MCNC: 87 MG/DL (ref 65–99)
HCG SERPL QL: NEGATIVE
HCT VFR BLD AUTO: 36.9 % (ref 37–47)
HGB BLD-MCNC: 10.6 G/DL (ref 12–16)
HYPOCHROMIA BLD QL SMEAR: ABNORMAL
IMM GRANULOCYTES # BLD AUTO: 0.06 K/UL (ref 0–0.11)
IMM GRANULOCYTES NFR BLD AUTO: 1.1 % (ref 0–0.9)
LIPASE SERPL-CCNC: 32 U/L (ref 7–58)
LYMPHOCYTES # BLD AUTO: 1.29 K/UL (ref 1–4.8)
LYMPHOCYTES NFR BLD: 24.6 % (ref 22–41)
MACROCYTES BLD QL SMEAR: ABNORMAL
MCH RBC QN AUTO: 24.7 PG (ref 27–33)
MCHC RBC AUTO-ENTMCNC: 28.7 G/DL (ref 33.6–35)
MCV RBC AUTO: 86 FL (ref 81.4–97.8)
MONOCYTES # BLD AUTO: 0.32 K/UL (ref 0–0.85)
MONOCYTES NFR BLD AUTO: 6.1 % (ref 0–13.4)
NEUTROPHILS # BLD AUTO: 3.53 K/UL (ref 2–7.15)
NEUTROPHILS NFR BLD: 67.2 % (ref 44–72)
NRBC # BLD AUTO: 0 K/UL
NRBC BLD-RTO: 0 /100 WBC
NT-PROBNP SERPL IA-MCNC: ABNORMAL PG/ML (ref 0–125)
OVALOCYTES BLD QL SMEAR: NORMAL
PLATELET # BLD AUTO: 297 K/UL (ref 164–446)
PLATELET BLD QL SMEAR: NORMAL
PMV BLD AUTO: 9.6 FL (ref 9–12.9)
POIKILOCYTOSIS BLD QL SMEAR: NORMAL
POTASSIUM SERPL-SCNC: 4.4 MMOL/L (ref 3.6–5.5)
PROT SERPL-MCNC: 10.6 G/DL (ref 6–8.2)
RBC # BLD AUTO: 4.29 M/UL (ref 4.2–5.4)
RBC BLD AUTO: PRESENT
SODIUM SERPL-SCNC: 131 MMOL/L (ref 135–145)
TROPONIN T SERPL-MCNC: 596 NG/L (ref 6–19)
WBC # BLD AUTO: 5.3 K/UL (ref 4.8–10.8)

## 2022-04-06 PROCEDURE — 96374 THER/PROPH/DIAG INJ IV PUSH: CPT

## 2022-04-06 PROCEDURE — 83690 ASSAY OF LIPASE: CPT

## 2022-04-06 PROCEDURE — 84703 CHORIONIC GONADOTROPIN ASSAY: CPT

## 2022-04-06 PROCEDURE — 96375 TX/PRO/DX INJ NEW DRUG ADDON: CPT

## 2022-04-06 PROCEDURE — 700111 HCHG RX REV CODE 636 W/ 250 OVERRIDE (IP): Performed by: STUDENT IN AN ORGANIZED HEALTH CARE EDUCATION/TRAINING PROGRAM

## 2022-04-06 PROCEDURE — 80053 COMPREHEN METABOLIC PANEL: CPT

## 2022-04-06 PROCEDURE — 93005 ELECTROCARDIOGRAM TRACING: CPT

## 2022-04-06 PROCEDURE — 84484 ASSAY OF TROPONIN QUANT: CPT

## 2022-04-06 PROCEDURE — 99285 EMERGENCY DEPT VISIT HI MDM: CPT

## 2022-04-06 PROCEDURE — 96376 TX/PRO/DX INJ SAME DRUG ADON: CPT

## 2022-04-06 PROCEDURE — 83880 ASSAY OF NATRIURETIC PEPTIDE: CPT

## 2022-04-06 PROCEDURE — 93005 ELECTROCARDIOGRAM TRACING: CPT | Performed by: STUDENT IN AN ORGANIZED HEALTH CARE EDUCATION/TRAINING PROGRAM

## 2022-04-06 PROCEDURE — 85025 COMPLETE CBC W/AUTO DIFF WBC: CPT

## 2022-04-06 RX ORDER — MORPHINE SULFATE 4 MG/ML
4 INJECTION INTRAVENOUS ONCE
Status: COMPLETED | OUTPATIENT
Start: 2022-04-07 | End: 2022-04-06

## 2022-04-06 RX ORDER — ONDANSETRON 2 MG/ML
4 INJECTION INTRAMUSCULAR; INTRAVENOUS ONCE
Status: COMPLETED | OUTPATIENT
Start: 2022-04-06 | End: 2022-04-06

## 2022-04-06 RX ORDER — MORPHINE SULFATE 4 MG/ML
4 INJECTION INTRAVENOUS ONCE
Status: COMPLETED | OUTPATIENT
Start: 2022-04-06 | End: 2022-04-06

## 2022-04-06 RX ADMIN — MORPHINE SULFATE 4 MG: 4 INJECTION INTRAVENOUS at 22:54

## 2022-04-06 RX ADMIN — MORPHINE SULFATE 4 MG: 4 INJECTION INTRAVENOUS at 23:39

## 2022-04-06 RX ADMIN — ONDANSETRON 4 MG: 2 INJECTION INTRAMUSCULAR; INTRAVENOUS at 22:54

## 2022-04-06 ASSESSMENT — PAIN DESCRIPTION - PAIN TYPE: TYPE: CHRONIC PAIN

## 2022-04-06 ASSESSMENT — FIBROSIS 4 INDEX: FIB4 SCORE: 0.79

## 2022-04-07 ENCOUNTER — APPOINTMENT (OUTPATIENT)
Dept: CARDIOLOGY | Facility: MEDICAL CENTER | Age: 27
End: 2022-04-07
Attending: HOSPITALIST
Payer: COMMERCIAL

## 2022-04-07 PROBLEM — R09.02 HYPOXIA: Status: ACTIVE | Noted: 2022-04-07

## 2022-04-07 LAB
FLUAV RNA SPEC QL NAA+PROBE: NEGATIVE
FLUBV RNA SPEC QL NAA+PROBE: NEGATIVE
LV EJECT FRACT  99904: 60
LV EJECT FRACT MOD 2C 99903: 62.4
LV EJECT FRACT MOD 4C 99902: 59.7
LV EJECT FRACT MOD BP 99901: 50.33
RSV RNA SPEC QL NAA+PROBE: NEGATIVE
SARS-COV-2 RNA RESP QL NAA+PROBE: NOTDETECTED
SPECIMEN SOURCE: NORMAL

## 2022-04-07 PROCEDURE — 93306 TTE W/DOPPLER COMPLETE: CPT | Mod: 26 | Performed by: INTERNAL MEDICINE

## 2022-04-07 PROCEDURE — 700117 HCHG RX CONTRAST REV CODE 255: Performed by: STUDENT IN AN ORGANIZED HEALTH CARE EDUCATION/TRAINING PROGRAM

## 2022-04-07 PROCEDURE — 71275 CT ANGIOGRAPHY CHEST: CPT

## 2022-04-07 PROCEDURE — 700111 HCHG RX REV CODE 636 W/ 250 OVERRIDE (IP): Performed by: INTERNAL MEDICINE

## 2022-04-07 PROCEDURE — 0241U HCHG SARS-COV-2 COVID-19 NFCT DS RESP RNA 4 TRGT MIC: CPT

## 2022-04-07 PROCEDURE — 96376 TX/PRO/DX INJ SAME DRUG ADON: CPT

## 2022-04-07 PROCEDURE — 90935 HEMODIALYSIS ONE EVALUATION: CPT

## 2022-04-07 PROCEDURE — 700111 HCHG RX REV CODE 636 W/ 250 OVERRIDE (IP): Performed by: STUDENT IN AN ORGANIZED HEALTH CARE EDUCATION/TRAINING PROGRAM

## 2022-04-07 PROCEDURE — 70450 CT HEAD/BRAIN W/O DYE: CPT

## 2022-04-07 PROCEDURE — 99220 PR INITIAL OBSERVATION CARE,LEVL III: CPT | Performed by: HOSPITALIST

## 2022-04-07 PROCEDURE — G0378 HOSPITAL OBSERVATION PER HR: HCPCS

## 2022-04-07 PROCEDURE — C9803 HOPD COVID-19 SPEC COLLECT: HCPCS | Performed by: HOSPITALIST

## 2022-04-07 PROCEDURE — 700102 HCHG RX REV CODE 250 W/ 637 OVERRIDE(OP): Performed by: HOSPITALIST

## 2022-04-07 PROCEDURE — A9270 NON-COVERED ITEM OR SERVICE: HCPCS | Performed by: HOSPITALIST

## 2022-04-07 PROCEDURE — 93306 TTE W/DOPPLER COMPLETE: CPT

## 2022-04-07 PROCEDURE — 96375 TX/PRO/DX INJ NEW DRUG ADDON: CPT

## 2022-04-07 PROCEDURE — C9803 HOPD COVID-19 SPEC COLLECT: HCPCS | Performed by: INTERNAL MEDICINE

## 2022-04-07 PROCEDURE — 700111 HCHG RX REV CODE 636 W/ 250 OVERRIDE (IP): Performed by: HOSPITALIST

## 2022-04-07 RX ORDER — AMLODIPINE BESYLATE 5 MG/1
5 TABLET ORAL DAILY
Status: DISCONTINUED | OUTPATIENT
Start: 2022-04-07 | End: 2022-04-08 | Stop reason: HOSPADM

## 2022-04-07 RX ORDER — MYCOPHENOLIC ACID 360 MG/1
360 TABLET, DELAYED RELEASE ORAL EVERY EVENING
Status: DISCONTINUED | OUTPATIENT
Start: 2022-04-07 | End: 2022-04-07

## 2022-04-07 RX ORDER — HYDROXYCHLOROQUINE SULFATE 200 MG/1
200 TABLET, FILM COATED ORAL DAILY
Status: DISCONTINUED | OUTPATIENT
Start: 2022-04-07 | End: 2022-04-08 | Stop reason: HOSPADM

## 2022-04-07 RX ORDER — LORAZEPAM 2 MG/ML
0.5 INJECTION INTRAMUSCULAR EVERY 6 HOURS PRN
Status: DISCONTINUED | OUTPATIENT
Start: 2022-04-07 | End: 2022-04-08

## 2022-04-07 RX ORDER — HYDROMORPHONE HYDROCHLORIDE 1 MG/ML
1 INJECTION, SOLUTION INTRAMUSCULAR; INTRAVENOUS; SUBCUTANEOUS
Status: DISCONTINUED | OUTPATIENT
Start: 2022-04-07 | End: 2022-04-08

## 2022-04-07 RX ORDER — MYCOPHENOLATE MOFETIL 250 MG/1
500 CAPSULE ORAL EVERY EVENING
Status: DISCONTINUED | OUTPATIENT
Start: 2022-04-07 | End: 2022-04-08 | Stop reason: HOSPADM

## 2022-04-07 RX ORDER — ACETAMINOPHEN 325 MG/1
650 TABLET ORAL EVERY 6 HOURS PRN
Status: DISCONTINUED | OUTPATIENT
Start: 2022-04-07 | End: 2022-04-08

## 2022-04-07 RX ORDER — ONDANSETRON 2 MG/ML
4 INJECTION INTRAMUSCULAR; INTRAVENOUS EVERY 4 HOURS PRN
Status: DISCONTINUED | OUTPATIENT
Start: 2022-04-07 | End: 2022-04-08 | Stop reason: HOSPADM

## 2022-04-07 RX ORDER — ATENOLOL 25 MG/1
25 TABLET ORAL DAILY
Status: DISCONTINUED | OUTPATIENT
Start: 2022-04-07 | End: 2022-04-08 | Stop reason: HOSPADM

## 2022-04-07 RX ORDER — DIPHENHYDRAMINE HYDROCHLORIDE 50 MG/ML
25 INJECTION INTRAMUSCULAR; INTRAVENOUS ONCE
Status: COMPLETED | OUTPATIENT
Start: 2022-04-07 | End: 2022-04-07

## 2022-04-07 RX ORDER — OMEPRAZOLE 20 MG/1
20 CAPSULE, DELAYED RELEASE ORAL 2 TIMES DAILY
Status: DISCONTINUED | OUTPATIENT
Start: 2022-04-07 | End: 2022-04-08 | Stop reason: HOSPADM

## 2022-04-07 RX ORDER — ONDANSETRON 4 MG/1
4 TABLET, ORALLY DISINTEGRATING ORAL EVERY 4 HOURS PRN
Status: DISCONTINUED | OUTPATIENT
Start: 2022-04-07 | End: 2022-04-08 | Stop reason: HOSPADM

## 2022-04-07 RX ORDER — HEPARIN SODIUM 1000 [USP'U]/ML
1500 INJECTION, SOLUTION INTRAVENOUS; SUBCUTANEOUS
Status: DISCONTINUED | OUTPATIENT
Start: 2022-04-07 | End: 2022-04-08 | Stop reason: HOSPADM

## 2022-04-07 RX ORDER — LORAZEPAM 2 MG/ML
0.5 INJECTION INTRAMUSCULAR EVERY 6 HOURS PRN
Status: DISCONTINUED | OUTPATIENT
Start: 2022-04-07 | End: 2022-04-07

## 2022-04-07 RX ORDER — PREDNISONE 1 MG/1
5 TABLET ORAL EVERY EVENING
Status: DISCONTINUED | OUTPATIENT
Start: 2022-04-07 | End: 2022-04-08 | Stop reason: HOSPADM

## 2022-04-07 RX ORDER — HYDROMORPHONE HYDROCHLORIDE 1 MG/ML
1 INJECTION, SOLUTION INTRAMUSCULAR; INTRAVENOUS; SUBCUTANEOUS ONCE
Status: COMPLETED | OUTPATIENT
Start: 2022-04-07 | End: 2022-04-07

## 2022-04-07 RX ADMIN — MYCOPHENOLATE MOFETIL 500 MG: 250 CAPSULE ORAL at 17:16

## 2022-04-07 RX ADMIN — ONDANSETRON 4 MG: 2 INJECTION INTRAMUSCULAR; INTRAVENOUS at 20:24

## 2022-04-07 RX ADMIN — IOHEXOL 56 ML: 350 INJECTION, SOLUTION INTRAVENOUS at 00:37

## 2022-04-07 RX ADMIN — LORAZEPAM 0.5 MG: 2 INJECTION INTRAMUSCULAR; INTRAVENOUS at 08:30

## 2022-04-07 RX ADMIN — HYDROMORPHONE HYDROCHLORIDE 1 MG: 1 INJECTION, SOLUTION INTRAMUSCULAR; INTRAVENOUS; SUBCUTANEOUS at 15:39

## 2022-04-07 RX ADMIN — AMLODIPINE BESYLATE 5 MG: 5 TABLET ORAL at 08:27

## 2022-04-07 RX ADMIN — HYDROMORPHONE HYDROCHLORIDE 1 MG: 1 INJECTION, SOLUTION INTRAMUSCULAR; INTRAVENOUS; SUBCUTANEOUS at 20:25

## 2022-04-07 RX ADMIN — ATENOLOL 25 MG: 25 TABLET ORAL at 08:27

## 2022-04-07 RX ADMIN — HYDROXYCHLOROQUINE SULFATE 200 MG: 200 TABLET, FILM COATED ORAL at 08:27

## 2022-04-07 RX ADMIN — HEPARIN SODIUM 1500 UNITS: 1000 INJECTION, SOLUTION INTRAVENOUS; SUBCUTANEOUS at 18:20

## 2022-04-07 RX ADMIN — OMEPRAZOLE 20 MG: 20 CAPSULE, DELAYED RELEASE ORAL at 15:39

## 2022-04-07 RX ADMIN — PREDNISONE 5 MG: 1 TABLET ORAL at 17:16

## 2022-04-07 RX ADMIN — DIPHENHYDRAMINE HYDROCHLORIDE 25 MG: 50 INJECTION INTRAMUSCULAR; INTRAVENOUS at 00:08

## 2022-04-07 RX ADMIN — HYDROMORPHONE HYDROCHLORIDE 1 MG: 1 INJECTION, SOLUTION INTRAMUSCULAR; INTRAVENOUS; SUBCUTANEOUS at 02:20

## 2022-04-07 RX ADMIN — HYDROMORPHONE HYDROCHLORIDE 1 MG: 1 INJECTION, SOLUTION INTRAMUSCULAR; INTRAVENOUS; SUBCUTANEOUS at 06:45

## 2022-04-07 RX ADMIN — OMEPRAZOLE 20 MG: 20 CAPSULE, DELAYED RELEASE ORAL at 08:28

## 2022-04-07 RX ADMIN — LORAZEPAM 0.5 MG: 2 INJECTION INTRAMUSCULAR; INTRAVENOUS at 22:29

## 2022-04-07 ASSESSMENT — ENCOUNTER SYMPTOMS
FEVER: 0
MYALGIAS: 0
COUGH: 0
NAUSEA: 0
WEAKNESS: 0
VOMITING: 0
DEPRESSION: 0
DOUBLE VISION: 0
PALPITATIONS: 1
BRUISES/BLEEDS EASILY: 0
INSOMNIA: 0
SORE THROAT: 0
NECK PAIN: 0
DIZZINESS: 0
SHORTNESS OF BREATH: 1
BLURRED VISION: 0
HEADACHES: 1

## 2022-04-07 ASSESSMENT — COGNITIVE AND FUNCTIONAL STATUS - GENERAL
SUGGESTED CMS G CODE MODIFIER DAILY ACTIVITY: CH
MOBILITY SCORE: 24
SUGGESTED CMS G CODE MODIFIER MOBILITY: CH
DAILY ACTIVITIY SCORE: 24

## 2022-04-07 ASSESSMENT — COPD QUESTIONNAIRES
HAVE YOU SMOKED AT LEAST 100 CIGARETTES IN YOUR ENTIRE LIFE: NO/DON'T KNOW
DO YOU EVER COUGH UP ANY MUCUS OR PHLEGM?: NO/ONLY WITH OCCASIONAL COLDS OR INFECTIONS
DURING THE PAST 4 WEEKS HOW MUCH DID YOU FEEL SHORT OF BREATH: NONE/LITTLE OF THE TIME
COPD SCREENING SCORE: 0

## 2022-04-07 ASSESSMENT — PAIN DESCRIPTION - PAIN TYPE
TYPE: ACUTE PAIN;CHRONIC PAIN
TYPE: ACUTE PAIN;CHRONIC PAIN
TYPE: CHRONIC PAIN

## 2022-04-07 ASSESSMENT — FIBROSIS 4 INDEX: FIB4 SCORE: 0.67

## 2022-04-07 NOTE — ED NOTES
Pt placed in room 4. Placed on cardiac monitor with MD, RN x 2 at bedside. Pt placed on NRB mask at 15L with o2 improvement

## 2022-04-07 NOTE — DISCHARGE PLANNING
Anticipated Discharge Disposition: Home    Action: LSW completed chart review. Per review, pt lives in [place] with parents. Per review, pt goes to dialysis MWF 0600 at [facility]. Pt has no 6-clicks documented. Per review, no SW needs identified at this time.    Barriers to Discharge: None    Plan: LSW to follow and assist as needed.      Care Transition Team Assessment  From 12/2021     Information Source  Orientation Level: Oriented X4  Information Given By:  (chart review)  Who is responsible for making decisions for patient? : Patient           Elopement Risk  Legal Hold: No  Ambulatory or Self Mobile in Wheelchair: Yes  Disoriented: No  Psychiatric Symptoms: None  History of Wandering: No  Elopement this Admit: No  Vocalizing Wanting to Leave: No  Displays Behaviors, Body Language Wanting to Leave: No-Not at Risk for Elopement  Elopement Risk: Not at Risk for Elopement     Interdisciplinary Discharge Planning  Primary Care Physician: Ella Matthew MD  Lives with - Patient's Self Care Capacity: Parents  Patient or legal guardian wants to designate a caregiver: No  Support Systems: Parent,Family Member(s)  Housing / Facility: [address] [facility]  Patient Prefers to be Discharged to:: Home  Durable Medical Equipment: Home Oxygen  DME Provider / Phone: Preffered     Discharge Preparedness  What is your plan after discharge?: Home with help  What are your discharge supports?: Parent  Prior Functional Level: Ambulatory  Difficulity with ADLs: None  Difficulity with IADLs: None     Functional Assesment  Prior Functional Level: Ambulatory     Finances  Financial Barriers to Discharge: No     Vision / Hearing Impairment  Vision Impairment : Yes  Right Eye Vision: Impaired,Wears Glasses  Left Eye Vision: Impaired,Wears Glasses  Hearing Impairment : No           Advance Directive  Advance Directive?: None     Domestic Abuse  Have you ever been the victim of abuse or violence?: No  Physical Abuse or Sexual Abuse: No  Verbal  Abuse or Emotional Abuse: No  Possible Abuse/Neglect Reported to:: Not Applicable     Psychological Assessment  History of Substance Abuse: None     Discharge Risks or Barriers  Discharge risks or barriers?: No     Anticipated Discharge Information  Discharge Disposition: Discharged to home/self care (01)

## 2022-04-07 NOTE — ASSESSMENT & PLAN NOTE
-Acute on chronic.  -Her baseline oxygen need is around 3 L, sometimes she needs up to 4 L.  She was hypoxic on arrival with O2 saturations of 83%, she is now back to her baseline of 4 L of oxygen via nasal cannula.  -She has underlying lupus, and I suspect shortness of breath as a result of worsening of her disease process in addition to pulmonary edema, however findings are also demonstrating enlarged main pulmonary artery and appearance of pulmonary arterial hypertension for which I am adding an echocardiogram in the morning.  She will likely need to follow with pulmonologist for this, she states she is not currently following with a pulmonologist outpatient and has been previously seen by our intensivist group when she was in the ICU before.    -She had a CT angiogram of the chest that was negative for PE.  -See above plan.

## 2022-04-07 NOTE — H&P
Hospital Medicine History & Physical Note    Date of Service  4/7/2022    Primary Care Physician  Ella Matthew M.D.    Consultants  None    Code Status  Full Code    Chief Complaint  Chief Complaint   Patient presents with   • Chest Pain     Pt presents to the ed from home with mom for chest pain x 20-30 minutes, pt states the pain started as a headache then had sudden onset chest pain radiating to the epigastric region, crying in triage, denies SOB n/v       History of Presenting Illness  Lily Nicole is a 26 y.o. female, well-known to this service, with history of lupus on immunotherapy, and end-stage renal disease on hemodialysis (M, W, F, Dr. Trent), chronic respiratory failure on 3L-4L of oxygen at baseline, who unfortunately has been hospitalized 9 times in 2021, mostly with respiratory issues and or issues related to GI bleed requiring multiple endoscopies.     She had 9 ER visits in 2022, related to ongoing pain, on chronic Dilaudid oral at home.  She is being followed by nephrology  (Dr. Trent), rheumatology (Dr. Wong), Hematology (Dr. Sanchez), GI (Dr. Whitney). She presented to the ED last night on 4/6/2022 with complains of headache, shortness of breath and intermittent chest pain.  She denies any nausea or vomiting, no black stools.  She was actually tearful when I came to see her saying that she overall does not feel well.  Headache started when she was driving home, mostly on the frontal region and rated as 10/10 in intensity and described as sharp in nature.  She denies visual changes, no slurred speech, no confusion, no focal weakness.  She does reports associated shortness of breath.    ER COURSE:  Patient tachycardic on arrival to the emergency department with heart rate around 130 bpm, hypertensive with blood pressure of 210/119 and tachypneic.  She was hypoxic on arrival with O2 saturations of 81%, she is currently on 4 L which sometimes is her baseline.  CT of the head was negative  for acute intracranial abnormalities.  -CT angiogram of the chest was negative for large PE and showing patchy bilateral infiltrates.  -Given intermittent hypoxia, pulmonary edema and had CT angiogram of the chest, she will need to have hemodialysis schedule not emergently for today.    I discussed the plan of care with patient.    Review of Systems  Review of Systems   Constitutional: Negative for fever.   HENT: Negative for congestion and sore throat.    Eyes: Negative for blurred vision and double vision.   Respiratory: Positive for shortness of breath. Negative for cough.    Cardiovascular: Positive for palpitations. Negative for chest pain.   Gastrointestinal: Negative for nausea and vomiting.   Genitourinary: Negative for dysuria and urgency.   Musculoskeletal: Negative for myalgias and neck pain.   Skin: Negative for itching and rash.   Neurological: Positive for headaches. Negative for dizziness and weakness.   Endo/Heme/Allergies: Does not bruise/bleed easily.   Psychiatric/Behavioral: Negative for depression. The patient does not have insomnia.        Past Medical History   has a past medical history of Anemia (01/17/2018), AVF (arteriovenous fistula) ([facility]), Chest pain, Chest tightness, Daytime sleepiness, Dialysis patient ([facility]), Difficulty breathing, ESRD (end stage renal disease) on dialysis ([facility]) (01/17/2018), Gasping for breath, Heart burn, Hypertension (01/17/2018), Indigestion, Lupus ([facility]), Migraines (01/17/2018), Painful breathing, Palpitations, Seizure ([facility]) (2013), Shortness of breath, Swelling of lower extremity, and Wheezing.    Surgical History   has a past surgical history that includes ronak by laparoscopy (4/5/2010); av fistula creation (Right); angioplasty (01/17/2018); other; other; gastroscopy-endo (12/9/2019); gastroscopy (N/A, 5/30/2020); gastroscopy-endo (9/18/2020); pr upper gi endoscopy,ctrl bleed (11/12/2020); pr upper gi endoscopy,biopsy (11/12/2020); gastroscopy-endo (11/12/2020);  pr colonoscopy,diagnostic (1/8/2021); pr upper gi endoscopy,diagnosis (1/8/2021); pr upper gi endoscopy,ctrl bleed (1/8/2021); pr upper gi endoscopy,diagnosis (3/5/2021); pr upper gi endoscopy,diagnosis (N/A, 3/19/2021); pr upper gi endoscopy,ctrl bleed (3/19/2021); and pr bronchoscopy,diagnostic (Bilateral, 5/13/2021).     Family History  family history includes Diabetes in her paternal grandmother.   Family history reviewed with patient. There is no family history that is pertinent to the chief complaint.     Social History   reports that she has never smoked. She has never used smokeless tobacco. She reports that she does not drink alcohol and does not use drugs.    Allergies  Allergies   Allergen Reactions   • Cephalexin Rash     Nausea and rash   Hives  .   • Clindamycin Rash     Nausea and rash     Hive  .   • Methylprednisolone      Anxious  Other reaction(s): Other-Reaction in Comments  Anxious   • Metoprolol Rash and Nausea     Nausea and rash     .   • Compazine      C/o anxiety   • Fentanyl And Related Anxiety   • Hydrocodone-Acetaminophen Rash and Nausea     Generalized rash  Generalized rash   • Maxipime [Cefepime] Itching   • Reglan [Metoclopramide] Anxiety   • Tape Rash     Paper tape is ok       Medications  Prior to Admission Medications   Prescriptions Last Dose Informant Patient Reported? Taking?   acetaminophen (TYLENOL) 500 MG Tab  Patient Yes No   Sig: Take 500-1,000 mg by mouth every 6 hours as needed. Indications: Pain   amLODIPine (NORVASC) 5 MG Tab  Patient Yes No   Sig: Take 5 mg by mouth every day.   atenolol (TENORMIN) 25 MG Tab  Patient Yes No   Sig: Take 25 mg by mouth every day.   cloNIDine (CATAPRES) 0.2 MG/24HR PATCH WEEKLY patch  Patient Yes No   Sig: Place 1 Patch on the skin every Friday.   hydroxychloroquine (PLAQUENIL) 200 MG Tab  Patient Yes No   Sig: Take 200 mg by mouth every day.   mycophenolate sodium (MYFORTIC) 360 MG Tablet Delayed Response tablet  Patient Yes No   Sig:  Take 360 mg by mouth every evening.   ondansetron (ZOFRAN ODT) 4 MG TABLET DISPERSIBLE  Patient Yes No   Sig: Take 4 mg by mouth every 6 hours as needed for Nausea.   pantoprazole (PROTONIX) 40 MG Tablet Delayed Response  Patient No No   Sig: Take 1 tablet by mouth 2 times a day.   predniSONE (DELTASONE) 5 MG Tab  Patient Yes No   Sig: Take 5 mg by mouth every evening.      Facility-Administered Medications: None       Physical Exam  Temp:  [36.3 °C (97.3 °F)] 36.3 °C (97.3 °F)  Pulse:  [] 89  Resp:  [16-28] 22  BP: (158-241)/(110-139) 158/110  SpO2:  [81 %-100 %] 100 %  Blood Pressure: 158/110   Temperature: 36.3 °C (97.3 °F)   Pulse: 89   Respiration: (!) 22   Pulse Oximetry: 100 %       Physical Exam  Constitutional:       Appearance: Normal appearance.      Comments: Looks uncomfortable.  Cachexia with bilateral temporal sparing.  Her BMI is 18.4, she has lost weight since last time I saw her.   HENT:      Head: Normocephalic and atraumatic.      Mouth/Throat:      Mouth: Mucous membranes are moist.      Pharynx: Oropharynx is clear.   Eyes:      Extraocular Movements: Extraocular movements intact.      Pupils: Pupils are equal, round, and reactive to light.   Cardiovascular:      Rate and Rhythm: Normal rate and regular rhythm.      Heart sounds: Normal heart sounds.   Pulmonary:      Effort: Pulmonary effort is normal.      Breath sounds: Normal breath sounds.   Abdominal:      General: Abdomen is flat. Bowel sounds are normal.      Palpations: Abdomen is soft.   Musculoskeletal:      Cervical back: Normal range of motion and neck supple.      Comments: Cyanosis observed on her fingertips.   Skin:     General: Skin is warm and dry.      Capillary Refill: Capillary refill takes less than 2 seconds.   Neurological:      General: No focal deficit present.      Mental Status: She is alert and oriented to person, place, and time.      Cranial Nerves: No cranial nerve deficit.      Motor: No weakness.       Coordination: Coordination normal.   Psychiatric:         Mood and Affect: Affect is tearful.         Laboratory:  Recent Labs     04/06/22 2235   WBC 5.3   RBC 4.29   HEMOGLOBIN 10.6*   HEMATOCRIT 36.9*   MCV 86.0   MCH 24.7*   MCHC 28.7*   RDW 58.7*   PLATELETCT 297   MPV 9.6     Recent Labs     04/06/22 2235   SODIUM 131*   POTASSIUM 4.4   CHLORIDE 85*   CO2 33   GLUCOSE 87   BUN 18   CREATININE 3.75*   CALCIUM 8.9     Recent Labs     04/06/22 2235   ALTSGPT 9   ASTSGOT 25   ALKPHOSPHAT 93   TBILIRUBIN 0.4   LIPASE 32   GLUCOSE 87         Recent Labs     04/06/22 2235   NTPROBNP >61396*         Recent Labs     04/06/22 2235   TROPONINT 596*       Imaging:  CT-CTA CHEST PULMONARY ARTERY W/ RECONS   Final Result         1.  No pulmonary embolus appreciated.   2.  Patchy bilateral pulmonary infiltrates, greatest in the lung bases   3.  Enlarged main pulmonary arteries, appearance suggests changes of pulmonary arterial hypertension.   4.  Fluid-filled distention of the esophagus, consider gastroesophageal reflux or esophageal dysmotility, similar to prior study   5.  Cardiomegaly.      CT-HEAD W/O   Final Result         1.  No acute intracranial abnormality is identified, there are nonspecific white matter changes, commonly associated with small vessel ischemic disease.  Associated mild cerebral atrophy is noted.             EKG:  I have personally reviewed the images and compared with prior images. and My impression is: Sinus tachycardia 121 bpm.  No acute ST elevation.  LVH.    Assessment/Plan:  I anticipate this patient is appropriate for observation status at this time.    * Pulmonary edema  Assessment & Plan  -Observation status.  -Hemodialysis patient with history of lupus and immunotherapy.  -She will need nonemergency hemodialysis session scheduled today, even though she typically goes Monday, Wednesdays and Fridays, she had contrast study last night and has had increased hypoxia under the context of  pulmonary edema.  -She follows with nephrologist, Dr. Trent.  I appreciate nephrology consult and recommendations.  I will have day team reaching out to nephrology team for formal consult and recommendations.    Hypoxia- (present on admission)  Assessment & Plan  -Acute on chronic.  -Her baseline oxygen need is around 3 L, sometimes she needs up to 4 L.  She was hypoxic on arrival with O2 saturations of 83%, she is now back to her baseline of 4 L of oxygen via nasal cannula.  -She has underlying lupus, and I suspect shortness of breath as a result of worsening of her disease process in addition to pulmonary edema, however findings are also demonstrating enlarged main pulmonary artery and appearance of pulmonary arterial hypertension for which I am adding an echocardiogram in the morning.  She will likely need to follow with pulmonologist for this, she states she is not currently following with a pulmonologist outpatient and has been previously seen by our intensivist group when she was in the ICU before.    -She had a CT angiogram of the chest that was negative for PE.  -See above plan.    Hypertensive urgency  Assessment & Plan  -Her initial blood pressure on arrival to the emergency department was 210/119, is down to 158/110.  This is likely contributing to her presentation with chest pain, headache.  , She takes amlodipine and atenolol that I will continue.    Anemia due to chronic kidney disease- (present on admission)  Assessment & Plan  -Her hemoglobin on admission is 10.6.  This is better than her previous hemoglobin 3 days ago at 8.7.  We will monitor CBC in the morning.  She denies any recent bloody emesis or dark/black stools.    Lupus (HCC)- (present on admission)  Assessment & Plan  -Continue Plaquenil, prednisone and mycophenolate.    ESRD (end stage renal disease) on dialysis (HCC)- (present on admission)  Assessment & Plan  on hemodialysis (M, W, F, Dr. Trent), she will need the nephrologist team  consult.  I appreciate recommendations.  Avoid nephrotoxic medication.  -She has underlying pulmonary edema and received contrast last night for CT angiogram of the chest, will likely need an additional nonemergency dialysis session today.      VTE prophylaxis: SCDs/TEDs

## 2022-04-07 NOTE — PROGRESS NOTES
Received report from ER nurse; pt arrived via gurney and walked to bed; on presentation, was not able to obtain accurate O2 reading; warmed pt's hands up a little and pt was sating at 100% on 4L NC; no longer tachy (80's-90's). BP was high; pt was scheduled to receive her morning BP meds, but was unable to pull them yet d/t pharmacy not approving them. Medicated pt w/pain medicine for 9/10 back/head pain. On continuous pulse ox. Call bell and personal items within reach of patient; skid socks available for when pt ambulates; bed in lowest locked position.

## 2022-04-07 NOTE — ASSESSMENT & PLAN NOTE
on hemodialysis (M, W, F, Dr. Trent), she will need the nephrologist team consult.  I appreciate recommendations.  Avoid nephrotoxic medication.  -She has underlying pulmonary edema and received contrast last night for CT angiogram of the chest, will likely need an additional nonemergency dialysis session today.

## 2022-04-07 NOTE — ED NOTES
patricia from Lab called with critical result of troponin at 596. Critical lab result read back to patricia.   Dr. fabian notified of critical lab result at 9617.  Critical lab result read back by Dr. fabian  .

## 2022-04-07 NOTE — DISCHARGE PLANNING
Outpatient Dialysis Note    Confirmed patient is active at:    [facility]    [address] [place] [id number]    Schedule: Monday, Wednesday, Friday   Time: 06:00am     Patient is seen by Dr. Trent in HD clinic.    Spoke with Viki at facility who confirmed.    Forwarded records for review.    Mary Han- Senior Dialysis Coordinator # [phone]  [facility] [employer]

## 2022-04-07 NOTE — ASSESSMENT & PLAN NOTE
-Observation status.  -Hemodialysis patient with history of lupus and immunotherapy.  -She will need nonemergency hemodialysis session scheduled today, even though she typically goes Monday, Wednesdays and Fridays, she had contrast study last night and has had increased hypoxia under the context of pulmonary edema.  -She follows with nephrologist, Dr. Trent.  I appreciate nephrology consult and recommendations.  I will have day team reaching out to nephrology team for formal consult and recommendations.

## 2022-04-07 NOTE — ASSESSMENT & PLAN NOTE
-Her hemoglobin on admission is 10.6.  This is better than her previous hemoglobin 3 days ago at 8.7.  We will monitor CBC in the morning.  She denies any recent bloody emesis or dark/black stools.

## 2022-04-07 NOTE — DIETARY
Nutrition services: Day 0 of admit.  Lily Nicole is a 26 y.o. female with admitting DX of Hypoxia.    Consult received for BMI<19. RD visited pt at bedside; pt's mother present. Pt denies changes in wt or PO intake. Reports drinks Novasource Renal supplement TID at home. RD reviewed breakfast 25-50% consumed this a.m.; pt states does not normally eat a full breakfast, but usually will have a supplement shake in the morning. Pt would like supplement QD w/ breakfast during admit per usual home routine; declines supplements TID.    Assessment:  Weight: 52.2 kg (115 lb) - via stand up scale  Body mass index is 18.48 kg/m²., BMI classification: Underweight/near low-normal BMI  Diet/Intake: Cardiac; 25-50% x 1 meal this admit    Evaluation:   1. PMHx lupus on immunotherapy, ESRD on HD (MWF), chronic respiratory failure on 3-4L home O2. Admitted w/ sudden onset chest pain. Dx list includes pulmonary edema, anemia d/t CKD, hypertensive urgency.  2. Labs: Na 131, crea 3.75, GFR 16  3. MAR includes prednisone  4. No noted wounds or edema per flowsheets.    Malnutrition Risk: ASPEN criteria not met    Recommendations/Plan:  1. Per Poor PO Protocol (pt w/ BMI<18.5), order Boost Plus QD w/ breakfast per pt preference.  2. Encourage intake of meals and supplements.  3. Document intake of all PO as % taken in ADL's to provide interdisciplinary communication across all shifts.   4. Monitor weight.  5. Nutrition rep will continue to see patient for ongoing meal and snack preferences.     RD monitoring informally; will rescreen weekly per protocol.

## 2022-04-07 NOTE — PROGRESS NOTES
4 Eyes Skin Assessment Completed by Selam RN and Ignacia RN.    Head WDL  Ears WDL  Nose WDL  Mouth WDL  Neck WDL  Breast/Chest WDL  Shoulder Blades WDL  Spine discoloration  (R) Arm/Elbow/Hand WDL  (L) Arm/Elbow/Hand WDL  Abdomen WDL  Groin WDL  Scrotum/Coccyx/Buttocks WDL  (R) Leg WDL  (L) Leg WDL  (R) Heel/Foot/Toe WDL  (L) Heel/Foot/Toe WDL          Devices In Places Blood Pressure Cuff, Pulse Ox and Nasal Cannula      Interventions In Place Gray Ear Foams    Possible Skin Injury No    Pictures Uploaded Into Epic N/A  Wound Consult Placed N/A  RN Wound Prevention Protocol Ordered No

## 2022-04-07 NOTE — CARE PLAN
The patient is Watcher - Medium risk of patient condition declining or worsening    Shift Goals  Clinical Goals: Pt verbalized decreased pain, under 5  Patient Goals: To feel better    Progress made toward(s) clinical / shift goals:  Given pain medication once. Pain tolerable throughout the day.     Patient is not progressing towards the following goals: Pt still has intermittent pain, but verbalizes improvement.

## 2022-04-07 NOTE — ED PROVIDER NOTES
CHIEF COMPLAINT  Chief Complaint   Patient presents with   • Chest Pain     Pt presents to the ed from home with mom for chest pain x 20-30 minutes, pt states the pain started as a headache then had sudden onset chest pain radiating to the epigastric region, crying in triage, denies SOB n/v       HPI  Lily Nicole is a 26 y.o. female who presents for evaluation of a severe sudden onset headache as well as chest pain or shortness of breath.  Patient has an underlying history of lupus end-stage renal disease on dialysis Monday Wednesday Friday last received dialysis today.  Patient states that she was driving home approximately 30 minutes ago when she had a sudden onset worst headache of her life in the frontal region that is rated a 10 out of 10.  It was not associate with exertion no neck stiffness no episodes of vomiting.  Shortly thereafter she did begin to experience a chest pain which was described as a sharp sensation on the right and left side with associated shortness of breath. Patient denies any black tarry stools or bloody stools.     REVIEW OF SYSTEMS  See HPI for further details. All other systems are negative.     PAST MEDICAL HISTORY   has a past medical history of Anemia (01/17/2018), AVF (arteriovenous fistula) ([facility]), Chest pain, Chest tightness, Daytime sleepiness, Dialysis patient ([facility]), Difficulty breathing, ESRD (end stage renal disease) on dialysis ([facility]) (01/17/2018), Gasping for breath, Heart burn, Hypertension (01/17/2018), Indigestion, Lupus ([facility]), Migraines (01/17/2018), Painful breathing, Palpitations, Seizure ([facility]) (2013), Shortness of breath, Swelling of lower extremity, and Wheezing.    SOCIAL HISTORY  Social History     Tobacco Use   • Smoking status: Never Smoker   • Smokeless tobacco: Never Used   Vaping Use   • Vaping Use: Never used   Substance and Sexual Activity   • Alcohol use: No   • Drug use: No   • Sexual activity: Not on file       SURGICAL HISTORY   has a past  surgical history that includes ronak by laparoscopy (4/5/2010); av fistula creation (Right); angioplasty (01/17/2018); other; other; gastroscopy-endo (12/9/2019); gastroscopy (N/A, 5/30/2020); gastroscopy-endo (9/18/2020); upper gi endoscopy,ctrl bleed (11/12/2020); upper gi endoscopy,biopsy (11/12/2020); gastroscopy-endo (11/12/2020); colonoscopy,diagnostic (1/8/2021); upper gi endoscopy,diagnosis (1/8/2021); upper gi endoscopy,ctrl bleed (1/8/2021); upper gi endoscopy,diagnosis (3/5/2021); upper gi endoscopy,diagnosis (N/A, 3/19/2021); upper gi endoscopy,ctrl bleed (3/19/2021); and bronchoscopy,diagnostic (Bilateral, 5/13/2021).    CURRENT MEDICATIONS  Home Medications    **Home medications have not yet been reviewed for this encounter**         ALLERGIES  Allergies   Allergen Reactions   • Cephalexin Rash     Nausea and rash   Hives  .   • Clindamycin Rash     Nausea and rash     Hive  .   • Methylprednisolone      Anxious  Other reaction(s): Other-Reaction in Comments  Anxious   • Metoprolol Rash and Nausea     Nausea and rash     .   • Compazine      C/o anxiety   • Fentanyl And Related Anxiety   • Hydrocodone-Acetaminophen Rash and Nausea     Generalized rash  Generalized rash   • Maxipime [Cefepime] Itching   • Reglan [Metoclopramide] Anxiety   • Tape Rash     Paper tape is ok       FAMILY HISTORY  No pertinent family history    PHYSICAL EXAM   /110   Pulse 88   Temp 36.3 °C (97.3 °F) (Temporal)   Resp 18   Wt 52.2 kg (115 lb)   SpO2 99%   BMI 18.48 kg/m²  @MAGALI[[id number]::@   Pulse ox interpretation:  I interpret this pulse ox as abnormal.  VITALS - vital signs documented prior to this note have been reviewed and noted,  GENERAL -awake alert crying GCS 15 on a nonrebreather   HEENT - normocephalic, atraumatic, pupils equal, sclera anicteric, mucus  membranes moist  NECK - supple, no meningismus, full active range of motion, trachea midline  CARDIOVASCULAR -tachycardic regular rate/rhythm, no  murmurs/gallops/rubs  PULMONARY - no respiratory distress, speaking in full sentences, clear to  auscultation bilaterally, no wheezing/ronchi/rales, no accessory muscle use  GASTROINTESTINAL - soft, non-tender, non-distended, no rebound, guarding,  or peritonitis  GENITOURINARY - Deferred  NEUROLOGIC -cranial nerves II through XII are intact pupils are equally round reactive to light upper extremity strength and lower extremity strength is 5 out of 5 sensation is grossly intact in all extremities patellar DTRs are 2+ bilaterally no truncal ataxia normal finger-to-nose no appreciable papilledema on funduscopic exam  MUSCULOSKELETAL - no obvious asymmetry or deformities present  EXTREMITIES - warm, well-perfused, no cyanosis or significant edema  DERMATOLOGIC - warm, dry, no rashes, no jaundice  PSYCHIATRIC - ccryingrying            EKG  EKG shows a sinus tachycardia rate of 121 with an otherwise normal MA QRS QTc interval, there does appear to be LVH, no ST elevation depression T wave version to suggest ischemia to rotation is sinus tach.  Changed when compared to prior    LABS  Labs Reviewed   CBC WITH DIFFERENTIAL - Abnormal; Notable for the following components:       Result Value    Hemoglobin 10.6 (*)     Hematocrit 36.9 (*)     MCH 24.7 (*)     MCHC 28.7 (*)     RDW 58.7 (*)     Immature Granulocytes 1.10 (*)     All other components within normal limits   COMP METABOLIC PANEL - Abnormal; Notable for the following components:    Sodium 131 (*)     Chloride 85 (*)     Creatinine 3.75 (*)     Total Protein 10.6 (*)     Globulin 7.3 (*)     All other components within normal limits   PROBRAIN NATRIURETIC PEPTIDE, NT - Abnormal; Notable for the following components:    NT-proBNP >12656 (*)     All other components within normal limits   TROPONIN - Abnormal; Notable for the following components:    Troponin T 596 (*)     All other components within normal limits   ESTIMATED GFR - Abnormal; Notable for the following  components:    GFR (CKD-EPI) 16 (*)     All other components within normal limits   LIPASE   PLATELET ESTIMATE   MORPHOLOGY   DIFFERENTIAL COMMENT   HCG QUAL SERUM   COV-2 AND FLU A/B BY PCR ([facility])       Results for orders placed or performed during the hospital encounter of 04/06/22   CBC WITH DIFFERENTIAL   Result Value Ref Range    WBC 5.3 4.8 - 10.8 K/uL    RBC 4.29 4.20 - 5.40 M/uL    Hemoglobin 10.6 (L) 12.0 - 16.0 g/dL    Hematocrit 36.9 (L) 37.0 - 47.0 %    MCV 86.0 81.4 - 97.8 fL    MCH 24.7 (L) 27.0 - 33.0 pg    MCHC 28.7 (L) 33.6 - 35.0 g/dL    RDW 58.7 (H) 35.9 - 50.0 fL    Platelet Count 297 164 - 446 K/uL    MPV 9.6 9.0 - 12.9 fL    Neutrophils-Polys 67.20 44.00 - 72.00 %    Lymphocytes 24.60 22.00 - 41.00 %    Monocytes 6.10 0.00 - 13.40 %    Eosinophils 0.20 0.00 - 6.90 %    Basophils 0.80 0.00 - 1.80 %    Immature Granulocytes 1.10 (H) 0.00 - 0.90 %    Nucleated RBC 0.00 /100 WBC    Neutrophils (Absolute) 3.53 2.00 - 7.15 K/uL    Lymphs (Absolute) 1.29 1.00 - 4.80 K/uL    Monos (Absolute) 0.32 0.00 - 0.85 K/uL    Eos (Absolute) 0.01 0.00 - 0.51 K/uL    Baso (Absolute) 0.04 0.00 - 0.12 K/uL    Immature Granulocytes (abs) 0.06 0.00 - 0.11 K/uL    NRBC (Absolute) 0.00 K/uL    Hypochromia 1+     Anisocytosis 1+     Macrocytosis 1+    COMP METABOLIC PANEL   Result Value Ref Range    Sodium 131 (L) 135 - 145 mmol/L    Potassium 4.4 3.6 - 5.5 mmol/L    Chloride 85 (L) 96 - 112 mmol/L    Co2 33 20 - 33 mmol/L    Anion Gap 13.0 7.0 - 16.0    Glucose 87 65 - 99 mg/dL    Bun 18 8 - 22 mg/dL    Creatinine 3.75 (H) 0.50 - 1.40 mg/dL    Calcium 8.9 8.4 - 10.2 mg/dL    AST(SGOT) 25 12 - 45 U/L    ALT(SGPT) 9 2 - 50 U/L    Alkaline Phosphatase 93 30 - 99 U/L    Total Bilirubin 0.4 0.1 - 1.5 mg/dL    Albumin 3.3 3.2 - 4.9 g/dL    Total Protein 10.6 (H) 6.0 - 8.2 g/dL    Globulin 7.3 (H) 1.9 - 3.5 g/dL    A-G Ratio 0.5 g/dL   proBrain Natriuretic Peptide, NT   Result Value Ref Range    NT-proBNP >37749 (H) 0 -  125 pg/mL   TROPONIN   Result Value Ref Range    Troponin T 596 (H) 6 - 19 ng/L   LIPASE   Result Value Ref Range    Lipase 32 7 - 58 U/L   ESTIMATED GFR   Result Value Ref Range    GFR (CKD-EPI) 16 (A) >60 mL/min/1.73 m 2   PLATELET ESTIMATE   Result Value Ref Range    Plt Estimation Normal    MORPHOLOGY   Result Value Ref Range    RBC Morphology Present     Poikilocytosis 1+     Ovalocytes 1+    DIFFERENTIAL COMMENT   Result Value Ref Range    Comments-Diff see below    BETA-HCG QUALITATIVE SERUM   Result Value Ref Range    Beta-Hcg Qualitative Serum Negative Negative   EKG   Result Value Ref Range    Report       [facility] Emergency Dept.    Test Date:  2022  Pt Name:    CASE WOODSON            Department: [facility]  MRN:        [id number]                      Room:  Gender:     Female                       Technician: JASON  :        1995                   Requested By:ER TRIAGE PROTOCOL  Order #:    [id number]                    Reading MD:    Measurements  Intervals                                Axis  Rate:       121                          P:          70  WI:         148                          QRS:        -17  QRSD:       83                           T:          146  QT:         333  QTc:        473    Interpretive Statements  Sinus tachycardia  LVH with secondary repolarization abnormality  Compared to ECG 2022 04:09:41  Sinus rhythm no longer present           Pertinent Labs & Imaging studies reviewed. (See chart for details)    RADIOLOGY  CT-CTA CHEST PULMONARY ARTERY W/ RECONS   Final Result         1.  No pulmonary embolus appreciated.   2.  Patchy bilateral pulmonary infiltrates, greatest in the lung bases   3.  Enlarged main pulmonary arteries, appearance suggests changes of pulmonary arterial hypertension.   4.  Fluid-filled distention of the esophagus, consider gastroesophageal reflux or esophageal dysmotility, similar to prior study   5.  Cardiomegaly.       CT-HEAD W/O   Final Result         1.  No acute intracranial abnormality is identified, there are nonspecific white matter changes, commonly associated with small vessel ischemic disease.  Associated mild cerebral atrophy is noted.             Procedure:  Bedside ultrasound,  A bedside cardiac ultrasound was performed by myself which did show a normal ejection fraction with no appreciable pericardial effusion, no septal bowing, and a roughly normal ejection fraction.  Lung ultrasound showed normal lung sliding no evidence of an obvious pneumothorax.  Normal bedside cardiac and lung ultrasound      ED COURSE     Medications   amLODIPine (NORVASC) tablet 5 mg (has no administration in time range)   atenolol (TENORMIN) tablet 25 mg (has no administration in time range)   hydroxychloroquine (Plaquenil) tablet 200 mg (has no administration in time range)   mycophenolate sodium (MYFORTIC) tablet 360 mg (has no administration in time range)   pantoprazole (PROTONIX) EC tablet 40 mg (has no administration in time range)   predniSONE (DELTASONE) tablet 5 mg (has no administration in time range)   acetaminophen (Tylenol) tablet 650 mg (has no administration in time range)   ondansetron (ZOFRAN) syringe/vial injection 4 mg (has no administration in time range)   ondansetron (ZOFRAN ODT) dispertab 4 mg (has no administration in time range)   Respiratory Therapy Consult (has no administration in time range)   HYDROmorphone (Dilaudid) injection 1 mg (has no administration in time range)   LORazepam (ATIVAN) injection 0.5 mg (has no administration in time range)   morphine 4 MG/ML injection 4 mg (4 mg Intravenous Given 4/6/22 2254)   ondansetron (ZOFRAN) syringe/vial injection 4 mg (4 mg Intravenous Given 4/6/22 2254)   morphine 4 MG/ML injection 4 mg (4 mg Intravenous Given 4/6/22 8449)   diphenhydrAMINE (BENADRYL) injection 25 mg (25 mg Intravenous Given 4/7/22 0008)   iohexol (OMNIPAQUE) 350 mg/mL (IV) (56 mL Intravenous  Given 4/7/22 0037)   HYDROmorphone (Dilaudid) injection 1 mg (1 mg Intravenous Given 4/7/22 0220)       MEDICAL DECISION MAKING    Patient presented for evaluation of sudden onset headache as well as a sudden onset severe chest pain and shortness of breath.  Patient has a frequent visits to the emergency department for similar complaints however today she is noted to be tachycardic and hypoxic with an O2 saturation of 81% and a pulse of 132, screaming and writhing in pain after being brought back from triage.  Complaining of severe chest pain shortness of breath and a severe headache.  To be hypoxic to 81% on room air and tacky to 130.  Differential initially included but was not limited to pericardial effusion pericardial tamponade, pneumothorax, pleural effusion pneumonia pulmonary embolism, subarachnoid hemorrhage, cranial mass intracranial bleed less likely aortic or carotid dissection ACS esophageal rupture among other considerations.  Bedside ultrasound was performed showed no obvious pneumothorax, pericardial effusion or evidence of tamponade.  Given the patient's hypoxia tachycardia CTA was ordered, patient is end-stage renal disease on dialysis and is anuric feel benefits of CTA outweigh the risks at this time given that she is already anuric on dialysis.  A CT head was also ordered given the severity of the patient's headache. She was give IV morphine and Zofarn.  CT head was negative for subarachnoid hemorrhage intracranial mass intracranial bleed.  CT head was obtained within 6 hours of the onset of the patient's headache, at this time do not see an indication to proceed proceed to lumbar puncture.  Labs remarkable for elevated troponin though this is near her baseline.  BNP elevated again though near her baseline.  Patient is anuric end-stage renal disease though there is no significant acidosis signs of fluid overload, uremia or hyperkalemia as such do not see an indication for emergent dialysis.  Though she will require dialysis in the next day or 2 given the contrast load.  CTA does show possible pulmonary hypertension, patient continues to have an increased oxygen requirement  Requiring 6 L of oxygen from her baseline 2 to 3 L as such she will be admitted for further care and evaluation of her acute hypoxic respiratory failure.  Admitted in stable condition            FINAL IMPRESSION  1.  Hypoxic respiratory failure  2.  Elevated troponin  3.  End-stage renal disease  4.  Shortness of breath  5. Headache       Critical care    Critical Care Procedure Note    Total critical care time: Approximately 42 minutes  Hypoxic respiratory failure the patient required my direct attention and intervention. This critical care time included obtaining a history; examining the patient; pulse oximetry; ordering and review of studies; arranging urgent treatment with development of a management plan; evaluation of patient's response to treatment; frequent reassessment; and, discussions with other providers.    was exclusive of separately billable procedures and treating other patients and teaching time.    Electronically signed by: Fortunato Larose D.O., 4/6/2022 10:43 PM      Dictation Disclaimer  Please note this report has been produced using speech recognition software and  may contain errors related to that system, including errors seen in grammar,  punctuation and spelling, as well as words and phrases that may be inappropriate.  If there are any questions or concerns, please feel free to contact the dictating  physician for clarification.

## 2022-04-07 NOTE — PROGRESS NOTES
Patient was admitted earlier by Dr Chase, for further details please review her H&P.    The patient was seen at bedside, the patient mentions she feels much better.  Currently not complaining of headache.  Nephrology was consulted, we appreciate further recommendations.  The patient might require hemodialysis session.  Blood pressure is better controlled.

## 2022-04-07 NOTE — ASSESSMENT & PLAN NOTE
-Her initial blood pressure on arrival to the emergency department was 210/119, is down to 158/110.  This is likely contributing to her presentation with chest pain, headache.  , She takes amlodipine and atenolol that I will continue.

## 2022-04-08 ENCOUNTER — APPOINTMENT (OUTPATIENT)
Dept: RADIOLOGY | Facility: MEDICAL CENTER | Age: 27
End: 2022-04-08
Attending: INTERNAL MEDICINE
Payer: COMMERCIAL

## 2022-04-08 ENCOUNTER — PATIENT OUTREACH (OUTPATIENT)
Dept: HEALTH INFORMATION MANAGEMENT | Facility: OTHER | Age: 27
End: 2022-04-08
Payer: COMMERCIAL

## 2022-04-08 VITALS
OXYGEN SATURATION: 100 % | BODY MASS INDEX: 17.96 KG/M2 | DIASTOLIC BLOOD PRESSURE: 81 MMHG | HEART RATE: 83 BPM | HEIGHT: 67 IN | TEMPERATURE: 98 F | RESPIRATION RATE: 16 BRPM | WEIGHT: 114.42 LBS | SYSTOLIC BLOOD PRESSURE: 123 MMHG

## 2022-04-08 LAB
ANION GAP SERPL CALC-SCNC: 11 MMOL/L (ref 7–16)
BUN SERPL-MCNC: 10 MG/DL (ref 8–22)
CALCIUM SERPL-MCNC: 9.5 MG/DL (ref 8.4–10.2)
CHLORIDE SERPL-SCNC: 94 MMOL/L (ref 96–112)
CO2 SERPL-SCNC: 29 MMOL/L (ref 20–33)
CREAT SERPL-MCNC: 2.77 MG/DL (ref 0.5–1.4)
ERYTHROCYTE [DISTWIDTH] IN BLOOD BY AUTOMATED COUNT: 63.2 FL (ref 35.9–50)
GFR SERPLBLD CREATININE-BSD FMLA CKD-EPI: 24 ML/MIN/1.73 M 2
GLUCOSE SERPL-MCNC: 76 MG/DL (ref 65–99)
HCT VFR BLD AUTO: 39.7 % (ref 37–47)
HGB BLD-MCNC: 10.8 G/DL (ref 12–16)
MCH RBC QN AUTO: 24.8 PG (ref 27–33)
MCHC RBC AUTO-ENTMCNC: 27.2 G/DL (ref 33.6–35)
MCV RBC AUTO: 91.3 FL (ref 81.4–97.8)
PLATELET # BLD AUTO: 281 K/UL (ref 164–446)
PMV BLD AUTO: 10.7 FL (ref 9–12.9)
POTASSIUM SERPL-SCNC: 4.7 MMOL/L (ref 3.6–5.5)
RBC # BLD AUTO: 4.35 M/UL (ref 4.2–5.4)
SODIUM SERPL-SCNC: 134 MMOL/L (ref 135–145)
WBC # BLD AUTO: 5.2 K/UL (ref 4.8–10.8)

## 2022-04-08 PROCEDURE — 90935 HEMODIALYSIS ONE EVALUATION: CPT

## 2022-04-08 PROCEDURE — G0378 HOSPITAL OBSERVATION PER HR: HCPCS

## 2022-04-08 PROCEDURE — 700102 HCHG RX REV CODE 250 W/ 637 OVERRIDE(OP): Performed by: HOSPITALIST

## 2022-04-08 PROCEDURE — 96376 TX/PRO/DX INJ SAME DRUG ADON: CPT

## 2022-04-08 PROCEDURE — 700111 HCHG RX REV CODE 636 W/ 250 OVERRIDE (IP): Performed by: HOSPITALIST

## 2022-04-08 PROCEDURE — 96375 TX/PRO/DX INJ NEW DRUG ADDON: CPT

## 2022-04-08 PROCEDURE — 74018 RADEX ABDOMEN 1 VIEW: CPT

## 2022-04-08 PROCEDURE — 99217 PR OBSERVATION CARE DISCHARGE: CPT | Performed by: INTERNAL MEDICINE

## 2022-04-08 PROCEDURE — 80048 BASIC METABOLIC PNL TOTAL CA: CPT

## 2022-04-08 PROCEDURE — A9270 NON-COVERED ITEM OR SERVICE: HCPCS | Performed by: HOSPITALIST

## 2022-04-08 PROCEDURE — 700111 HCHG RX REV CODE 636 W/ 250 OVERRIDE (IP): Performed by: INTERNAL MEDICINE

## 2022-04-08 PROCEDURE — 85027 COMPLETE CBC AUTOMATED: CPT

## 2022-04-08 PROCEDURE — 36415 COLL VENOUS BLD VENIPUNCTURE: CPT

## 2022-04-08 RX ORDER — AMOXICILLIN 250 MG
2 CAPSULE ORAL 2 TIMES DAILY
Status: DISCONTINUED | OUTPATIENT
Start: 2022-04-08 | End: 2022-04-08 | Stop reason: HOSPADM

## 2022-04-08 RX ORDER — ACETAMINOPHEN 500 MG
1000 TABLET ORAL 3 TIMES DAILY
Status: DISCONTINUED | OUTPATIENT
Start: 2022-04-08 | End: 2022-04-08 | Stop reason: HOSPADM

## 2022-04-08 RX ORDER — POLYETHYLENE GLYCOL 3350 17 G/17G
1 POWDER, FOR SOLUTION ORAL
Status: DISCONTINUED | OUTPATIENT
Start: 2022-04-08 | End: 2022-04-08 | Stop reason: HOSPADM

## 2022-04-08 RX ORDER — BISACODYL 10 MG
10 SUPPOSITORY, RECTAL RECTAL
Status: DISCONTINUED | OUTPATIENT
Start: 2022-04-08 | End: 2022-04-08 | Stop reason: HOSPADM

## 2022-04-08 RX ADMIN — HYDROMORPHONE HYDROCHLORIDE 1 MG: 1 INJECTION, SOLUTION INTRAMUSCULAR; INTRAVENOUS; SUBCUTANEOUS at 00:15

## 2022-04-08 RX ADMIN — AMLODIPINE BESYLATE 5 MG: 5 TABLET ORAL at 05:51

## 2022-04-08 RX ADMIN — OMEPRAZOLE 20 MG: 20 CAPSULE, DELAYED RELEASE ORAL at 02:14

## 2022-04-08 RX ADMIN — ACETAMINOPHEN 650 MG: 325 TABLET, FILM COATED ORAL at 08:43

## 2022-04-08 RX ADMIN — HYDROMORPHONE HYDROCHLORIDE 1 MG: 1 INJECTION, SOLUTION INTRAMUSCULAR; INTRAVENOUS; SUBCUTANEOUS at 03:55

## 2022-04-08 RX ADMIN — ONDANSETRON 4 MG: 2 INJECTION INTRAMUSCULAR; INTRAVENOUS at 02:14

## 2022-04-08 RX ADMIN — ATENOLOL 25 MG: 25 TABLET ORAL at 05:51

## 2022-04-08 RX ADMIN — HEPARIN SODIUM 1500 UNITS: 1000 INJECTION, SOLUTION INTRAVENOUS; SUBCUTANEOUS at 09:18

## 2022-04-08 RX ADMIN — HYDROXYCHLOROQUINE SULFATE 200 MG: 200 TABLET, FILM COATED ORAL at 05:51

## 2022-04-08 RX ADMIN — EPOETIN ALFA-EPBX 3000 UNITS: 3000 INJECTION, SOLUTION INTRAVENOUS; SUBCUTANEOUS at 09:41

## 2022-04-08 RX ADMIN — LORAZEPAM 0.5 MG: 2 INJECTION INTRAMUSCULAR; INTRAVENOUS at 05:51

## 2022-04-08 ASSESSMENT — LIFESTYLE VARIABLES
EVER FELT BAD OR GUILTY ABOUT YOUR DRINKING: NO
ON A TYPICAL DAY WHEN YOU DRINK ALCOHOL HOW MANY DRINKS DO YOU HAVE: 0
HOW MANY TIMES IN THE PAST YEAR HAVE YOU HAD 5 OR MORE DRINKS IN A DAY: 0
TOTAL SCORE: 0
TOTAL SCORE: 0
EVER HAD A DRINK FIRST THING IN THE MORNING TO STEADY YOUR NERVES TO GET RID OF A HANGOVER: NO
HAVE PEOPLE ANNOYED YOU BY CRITICIZING YOUR DRINKING: NO
TOTAL SCORE: 0
AVERAGE NUMBER OF DAYS PER WEEK YOU HAVE A DRINK CONTAINING ALCOHOL: 0
CONSUMPTION TOTAL: NEGATIVE
ALCOHOL_USE: NO
HAVE YOU EVER FELT YOU SHOULD CUT DOWN ON YOUR DRINKING: NO

## 2022-04-08 ASSESSMENT — PAIN DESCRIPTION - PAIN TYPE
TYPE: ACUTE PAIN

## 2022-04-08 NOTE — CARE PLAN
The patient is Stable - Low risk of patient condition declining or worsening    Shift Goals  Clinical Goals: Pt pain will be at a stated tolerable level of 3/10 after pain intervention. Pt pain will be tolerable AEB pt being able to sleep at least 5 hours.  Patient Goals: To feel better    Progress made toward(s) clinical / shift goals:  Pt pain managed with PRN medication per MAR. Pt was able to sleep from 1834-5906 then 0030- 0330. Pt is currently sleeping as well. Care clustered to promote rest.    Patient is not progressing towards the following goals: n/a      Problem: Pain - Standard  Goal: Alleviation of pain or a reduction in pain to the patient’s comfort goal  Outcome: Progressing     Problem: Knowledge Deficit - Standard  Goal: Patient and family/care givers will demonstrate understanding of plan of care, disease process/condition, diagnostic tests and medications  Outcome: Progressing     Problem: Hemodynamics  Goal: Patient's hemodynamics, fluid balance and neurologic status will be stable or improve  Outcome: Progressing     Problem: Fluid Volume  Goal: Fluid volume balance will be maintained  Outcome: Progressing

## 2022-04-08 NOTE — CONSULTS
DATE OF SERVICE:  04/07/2022     REQUESTING PHYSICIAN:  Rivera Alfaro MD     REASON FOR CONSULTATION:  Management of respiratory failure and uncontrolled   hypertension, assessing the need for urgent dialysis.     The patient seen and examined, medical record reviewed.     HISTORY OF PRESENT ILLNESS:  The patient is an unfortunate 26-year-old lady   who is very well known to our service.  She has a history of end-stage renal   disease, undergoes hemodialysis on Monday, Wednesday, Friday at [facility], she is under the care of my associate, Dr. Melinda Trent, the patient   presented to the hospital earlier this morning with chest pain and severe   headache.  She was found to be in hypertension, urgency, also was found to   have respiratory failure and elevated BNP.  We were called to manage her   hypertension urgency and volume overload, assessing the need for urgent   dialysis.     The patient has no change in vision.  No nausea, no vomiting, no chest pain   when I saw her.     PAST MEDICAL HISTORY:  Significant for:  1.  End-stage renal disease.  2.  Lupus.     ALLERGIES:  The list was reviewed.     SOCIAL HISTORY:  The patient has no smoking history.     FAMILY HISTORY:  Positive for diabetes in her grandmother.     MEDICATIONS:  Reviewed.     REVIEW OF SYSTEMS:  The patient is very anxious.  She is complaining of   occasional headache; however, her headache by the time I saw her was better.    No chest pain at that time.  All other review of systems is negative except   outlined in history of present illness.     PHYSICAL EXAMINATION:  GENERAL:  The patient appears anxious, but no apparent distress.  VITAL SIGNS:  Blood pressure of 157/106, heart rate was 88, respiratory rate   was 14.  HEENT:  Normocephalic, atraumatic.  Sclerae are anicteric.  Pupils are   reactive.  Nose normal.  Mucous membranes moist.  NECK:  No lymphadenopathy, no JVD, no thyroid mass.  CHEST:  Normal.  LUNGS:  Few  rales at the bases.  HEART:  S1, S2.  ABDOMEN:  Soft, nontender.  No hepatosplenomegaly.  There is no inguinal   lymphadenopathy.  EXTREMITIES:  There is trace lower extremity edema.  NEUROLOGIC:  The patient is alert and oriented x3.  MOOD:  The patient is anxious.     LABORATORY DATA:  Her recent labs from today were reviewed.     DIAGNOSTIC DATA:  The patient also had EKG done yesterday, I reviewed the EKG   myself, which showed sinus rhythm with a rate at 121.     ASSESSMENT:  1.  End-stage renal disease.  2.  Respiratory failure.  3.  Uncontrolled hypertension.  4.  Tachycardia.  5.  Anemia.  6.  Hyponatremia.     PLAN:  1.  We will plan urgent dialysis to manage respiratory failure, uncontrolled   hypertension.  2.  Evaluate daily for the need of dialysis.  3.  Renal diet.  4.  Renal dose all medications.  5.  Reevaluate antihypertensive medication regimen after dialysis.  6.  Prognosis is guarded.     Plan discussed in detail with Dr. Ferny Alfaro.        ______________________________  FADI NAJJAR, MD FN/[facility]    DD:  04/07/2022 15:47  DT:  04/07/2022 17:58    Job#:  [id number]

## 2022-04-08 NOTE — PROCEDURES
Pt with ESRD, presented with hypertension urgency, respiratory failure.  Pt is doing better.  Seen and examined while getting HD.  Okay to discharge postdialysis

## 2022-04-08 NOTE — PROGRESS NOTES
Pt discharged via wheelchair with escort. Pt verbalized understanding of discharge instructions. PIV removed. Pt discharged home.

## 2022-04-08 NOTE — DISCHARGE INSTRUCTIONS
Discharge Instructions    Discharged to home by car with relative. Discharged via wheelchair, hospital escort: Yes.  Special equipment needed: Not Applicable    Be sure to schedule a follow-up appointment with your primary care doctor or any specialists as instructed.     Discharge Plan:   Diet Plan: Discussed  Confirmed Follow up Appointment: Patient to Call and Schedule Appointment  Confirmed Symptoms Management: Discussed  Medication Reconciliation Updated: Yes    I understand that a diet low in cholesterol, fat, and sodium is recommended for good health. Unless I have been given specific instructions below for another diet, I accept this instruction as my diet prescription.   Other diet: Cardiac    Special Instructions: None    · Is patient discharged on Warfarin / Coumadin?   No     Depression / Suicide Risk    As you are discharged from this [facility] facility, it is important to learn how to keep safe from harming yourself.    Recognize the warning signs:  · Abrupt changes in personality, positive or negative- including increase in energy   · Giving away possessions  · Change in eating patterns- significant weight changes-  positive or negative  · Change in sleeping patterns- unable to sleep or sleeping all the time   · Unwillingness or inability to communicate  · Depression  · Unusual sadness, discouragement and loneliness  · Talk of wanting to die  · Neglect of personal appearance   · Rebelliousness- reckless behavior  · Withdrawal from people/activities they love  · Confusion- inability to concentrate     If you or a loved one observes any of these behaviors or has concerns about self-harm, here's what you can do:  · Talk about it- your feelings and reasons for harming yourself  · Remove any means that you might use to hurt yourself (examples: pills, rope, extension cords, firearm)  · Get professional help from the community (Mental Health, Substance Abuse, psychological counseling)  · Do not be  alone:Call your Safe Contact- someone whom you trust who will be there for you.  · Call your local CRISIS HOTLINE 298-2158 or 695-821-6390  · Call your local Children's Mobile Crisis Response Team [place] (006) 674-6348 or www.[employer]  · Call the toll free National Suicide Prevention Hotlines   · National Suicide Prevention Lifeline 241-768-SRAO (4051)  · National Hope Line Network 800-SUICIDE (444-4261)      Dialysis  Dialysis is a procedure that is done when the kidneys have stopped working properly (kidney failure). It may also be done earlier if it may help improve symptoms. During dialysis, wastes, salt, and extra water are removed from the blood, and the levels of certain minerals in the blood are maintained.  Dialysis is done in sessions which are continued until the kidneys get better. If the kidneys cannot get better, such as in end-stage kidney disease, dialysis is continued for life or until you receive a new kidney from a donor (kidney transplant). There are two types of dialysis: hemodialysis and peritoneal dialysis.  What is hemodialysis?              Hemodialysis is when a machine called a dialyzer is used to filter the blood. Before starting hemodialysis, you will have surgery to create a site where blood can be removed from the body and returned to the body (vascular access). There are three types of vascular accesses:  · Arteriovenous fistula. This type of access is created when an artery and a vein (usually in the arm) are connected during surgery. The arteriovenous fistula usually takes 1-6 months to develop after surgery. It may last longer than the other types of vascular accesses and is less likely to become infected or cause blood clots.  · Arteriovenous graft. This type of access is created when an artery and a vein in the arm are connected during surgery with a tube. An arteriovenous graft can usually be used within 2-3 weeks of surgery.  · A venous catheter. To create this  type of access, a thin tube (catheter) is placed in a large vein in your neck, chest, or groin. A venous catheter can be used right away. It is usually used as a temporary access when dialysis needs to begin immediately.  During hemodialysis, blood leaves your body through your access site. It travels through a tube to the dialyzer, where it is filtered. The blood then returns to your body through another tube.  Hemodialysis is usually done at a hospital or dialysis center three times a week. Visits last about 3-5 hours. With special training, it may also be done at home with the help of another person.  What is peritoneal dialysis?  Peritoneal dialysis is when the thin lining of the abdomen (peritoneum) and a fluid called dialysate are used to filter the blood. Before starting peritoneal dialysis, you will have surgery to place a catheter in your abdomen. The catheter will be used to transfer dialysate to and from your abdomen.  At the start of a session, your abdomen is filled with dialysate. During the session, wastes, salt, and extra water in the blood pass through the peritoneum and into the dialysate. The dialysate is drained from the body at the end of the session. The process of filling and draining the dialysate is called an exchange. Exchanges are repeated until you have used up all the dialysate for the day.  You may do peritoneal dialysis at home or at almost any other location. It is done every day. You may need up to five exchanges a day. Each exchange takes about 30-40 minutes. The amount of time the dialysate is in your body between exchanges is called a dwell. The dwell usually lasts 1.5-3 hours and can vary with each person. You may choose to do exchanges at night while you sleep, using a machine called a cycler.  Which type of dialysis should I choose?  Both types of dialysis have advantages and disadvantages. Talk with your health care provider about which type of dialysis is best for you. Your  lifestyle, preferences, and medical condition should be considered. In some cases, only one type of dialysis can be chosen.  Advantages of hemodialysis  · It is done less often than peritoneal dialysis.  · Someone else can do the dialysis for you.  · If you go to a dialysis center:  ? Your health care provider can recognize any problems you may be having.  ? You can interact with others who are having dialysis. This can provide you with emotional support.  Disadvantages of hemodialysis  · Hemodialysis may cause cramps and low blood pressure. It may leave you feeling tired on the days you have the treatment.  · If you go to a dialysis center, you will need to make weekly appointments and work around the center’s schedule.  · You will need to take extra care when traveling. If you usually get treatment in a dialysis center, you will need to arrange to visit a dialysis center near your destination. If you are having treatments at home, you will need to take the dialyzer with you when traveling.  · There are more eating restrictions than with peritoneal dialysis.  Advantages of peritoneal dialysis  · It is less likely than hemodialysis to cause cramps and low blood pressure.  · There are fewer eating restrictions than with hemodialysis.  · You may do exchanges on your own wherever you are, including when you travel.  Disadvantages of peritoneal dialysis  · It is done more often than hemodialysis.  · Doing peritoneal dialysis requires you to have a good use (dexterity) of your hands. You must also be able to lift bags.  · You must learn how to make your equipment free of germs (sterilization techniques). You will need to use these techniques every day to prevent infection.  What changes will I need to make to my diet during dialysis?  Both types of dialysis require you to make some changes to your diet. For example, you will need to limit your intake of foods that contain a lot of phosphorus and potassium. You will also  need to limit your fluid intake. A diet and nutrition specialist (dietitian) can help you make a meal plan that can help improve your dialysis and your health.  What should I expect when starting dialysis?  Adjusting to the dialysis treatment, schedule, and diet can take some time. You may need to stop working and may not be able to do some of your normal activities. You may feel anxious or depressed when starting dialysis. Over time, many people feel better overall because of dialysis. You may be able to return to work after making some changes, such as reducing work intensity.  Where to find more information  · National Kidney Foundation: www.kidney.org  · American Association of Kidney Patients: www.aakp.org  · American Kidney Fund: www.kidneyfund.org  Summary  · During dialysis, wastes, salt, and extra water are removed from the blood, and the levels of certain minerals in the blood are maintained. There are two types of dialysis: hemodialysis and peritoneal dialysis.  · Hemodialysis is when a machine called a dialyzer is used to filter the blood.  · Hemodialysis is usually done by a health care provider at a hospital or dialysis center three times a week.  · Peritoneal dialysis is when the peritoneum is used as a filter. You may do peritoneal dialysis at home or at almost any other location.  · Both types of dialysis have advantages and disadvantages. Talk with your health care provider about which type of dialysis is best for you.  This information is not intended to replace advice given to you by your health care provider. Make sure you discuss any questions you have with your health care provider.  Document Released: 03/09/2004 Document Revised: 04/08/2020 Document Reviewed: 02/13/2018  Elsevier Patient Education © 2020 [employer]

## 2022-04-08 NOTE — PROGRESS NOTES
[facility] Progress Note     HD treatment ordered by Dr Najjar x 2.5 hours.  Treatment Start time: 0922          End time: 1206       Net UF 2000 ml    Patient tolerated treatment well. VS stable all through out.     All blood was returned. 15 g HD needle removed from RUE AVF. Dry gauze dressing applied and changed without bleeding issue. + Bruit/Thrill pre-post Treatment. See flow sheet for details.     Report given to CHAUNCEY Vega RN.

## 2022-04-08 NOTE — PROGRESS NOTES
Assumed care of pt at 1915. Received report from El MARTINEZ. Pt currently having dialysis with Rajesh MARTINEZ at bedside. Pt stated her pain is a 9/10 and that she feels nauseated, medicated per MAR. SpO2 at 100% on 4L via nasal cannula. Pt updated on plan for the night which included monitoring pain and vitals. No other needs at this time, call light in reach, bed in lowest position.

## 2022-04-08 NOTE — DISCHARGE SUMMARY
"Discharge Summary    CHIEF COMPLAINT ON ADMISSION  Chief Complaint   Patient presents with   • Chest Pain     Pt presents to the ed from home with mom for chest pain x 20-30 minutes, pt states the pain started as a headache then had sudden onset chest pain radiating to the epigastric region, crying in triage, denies SOB n/v       Reason for Admission  Chest Pain     Admission Date  4/6/2022    CODE STATUS  Full Code    HPI & HOSPITAL COURSE  As per chart review:  \"26 y.o. female, well-known to this service, with history of lupus on immunotherapy, and end-stage renal disease on hemodialysis (M, W, F, Dr. Trent), chronic respiratory failure on 3L-4L of oxygen at baseline, who unfortunately has been hospitalized 9 times in 2021, mostly with respiratory issues and or issues related to GI bleed requiring multiple endoscopies.      She had 9 ER visits in 2022, related to ongoing pain, on chronic Dilaudid oral at home.  She is being followed by nephrology  (Dr. Trent), rheumatology (Dr. Wong), Hematology (Dr. Sanchez), GI (Dr. Whitney). She presented to the ED last night on 4/6/2022 with complains of headache, shortness of breath and intermittent chest pain.  She denies any nausea or vomiting, no black stools.  She was actually tearful when I came to see her saying that she overall does not feel well.  Headache started when she was driving home, mostly on the frontal region and rated as 10/10 in intensity and described as sharp in nature.  She denies visual changes, no slurred speech, no confusion, no focal weakness.  She does reports associated shortness of breath.     ER COURSE:  Patient tachycardic on arrival to the emergency department with heart rate around 130 bpm, hypertensive with blood pressure of 210/119 and tachypneic.  She was hypoxic on arrival with O2 saturations of 81%, she is currently on 4 L which sometimes is her baseline.  CT of the head was negative for acute intracranial abnormalities.  -CT angiogram of " "the chest was negative for large PE and showing patchy bilateral infiltrates.  -Given intermittent hypoxia, pulmonary edema and had CT angiogram of the chest, she will need to have hemodialysis schedule not emergently for today.\"    Patient was admitted for further management and care.  The patient blood pressure quickly resolved by restarting her medications.  The patient is headache also quickly resolved.  The patient received dialysis and felt much better.    The patient is back to her baseline, she will receive dialysis session today and she will be discharged home afterwards.    The patient will require close follow-up with PCP and nephrology as an outpatient.      Therefore, she is discharged in fair and stable condition to home with close outpatient follow-up.    The patient recovered much more quickly than anticipated on admission.    Discharge Date  04/08/2022    FOLLOW UP ITEMS POST DISCHARGE  The patient will require close follow-up with PCP and nephrology as an outpatient as well as with her rheumatologist.    DISCHARGE DIAGNOSES  Principal Problem:    Pulmonary edema POA: Unknown  Active Problems:    ESRD (end stage renal disease) on dialysis (HCC) POA: Yes    Lupus (HCC) POA: Yes    Anemia due to chronic kidney disease POA: Yes    Hypertensive urgency POA: Unknown    Hypoxia POA: Yes  Resolved Problems:    * No resolved hospital problems. *      FOLLOW UP  Future Appointments   Date Time Provider Department Center   4[facility]17[facility]2022 11[facility]   4/19/2022 12:40 PM Trevor Oliveira M.D. [facility] None   4/19/2022  1:30 [facility]   4/20/2022  1:00 PM Jose Zepeda M.D. [facility] None   4/22/2022  1:00 PM [facility] DX 1 [facility] SHANNAN Chavez   5/26/2022  2:00 PM Edna Dahl PsyD [facility] [address] M.D.  [address] [facility] [address]  [phone]    Schedule an appointment as soon as possible for a visit      Melinda Trent M.D.  [address]  [address]  [place] [address]  [phone]    Schedule an appointment as soon as possible for a visit        MEDICATIONS ON DISCHARGE     Medication List      CONTINUE taking these medications      Instructions   acetaminophen 500 MG Tabs  Commonly known as: TYLENOL   Take 500-1,000 mg by mouth every 6 hours as needed. Indications: Pain  Dose: 500-1,000 mg     amLODIPine 5 MG Tabs  Commonly known as: NORVASC   Take 5 mg by mouth every day.  Dose: 5 mg     atenolol 25 MG Tabs  Commonly known as: TENORMIN   Take 25 mg by mouth every day.  Dose: 25 mg     cloNIDine 0.2 MG/24HR Ptwk patch  Commonly known as: CATAPRES   Place 1 Patch on the skin every Friday.  Dose: 1 Patch     hydroxychloroquine 200 MG Tabs  Commonly known as: Plaquenil   Take 200 mg by mouth every day.  Dose: 200 mg     mycophenolate sodium 360 MG Tbec tablet  Commonly known as: MYFORTIC   Take 360 mg by mouth every evening.  Dose: 360 mg     ondansetron 4 MG Tbdp  Commonly known as: ZOFRAN ODT   Take 4 mg by mouth every 6 hours as needed for Nausea.  Dose: 4 mg     pantoprazole 40 MG Tbec  Commonly known as: PROTONIX   Take 1 tablet by mouth 2 times a day.  Dose: 40 mg     predniSONE 5 MG Tabs  Commonly known as: DELTASONE   Take 5 mg by mouth every evening.  Dose: 5 mg            Allergies  Allergies   Allergen Reactions   • Cephalexin Rash     Nausea and rash   Hives  .   • Clindamycin Rash     Nausea and rash     Hive  .   • Methylprednisolone      Anxious  Other reaction(s): Other-Reaction in Comments  Anxious   • Metoprolol Rash and Nausea     Nausea and rash     .   • Compazine      C/o anxiety   • Fentanyl And Related Anxiety   • Hydrocodone-Acetaminophen Rash and Nausea     Generalized rash  Generalized rash   • Maxipime [Cefepime] Itching   • Reglan [Metoclopramide] Anxiety   • Tape Rash     Paper tape is ok       DIET  Orders Placed This Encounter   Procedures   • Diet Order Diet: Cardiac     Standing Status:   Standing     Number of  Occurrences:   1     Order Specific Question:   Diet:     Answer:   Cardiac [6]       ACTIVITY  As tolerated.  Weight bearing as tolerated    CONSULTATIONS  Nephrology    PROCEDURES  Dialysis    EC-ECHOCARDIOGRAM COMPLETE W/O CONT   Final Result      CT-CTA CHEST PULMONARY ARTERY W/ RECONS   Final Result         1.  No pulmonary embolus appreciated.   2.  Patchy bilateral pulmonary infiltrates, greatest in the lung bases   3.  Enlarged main pulmonary arteries, appearance suggests changes of pulmonary arterial hypertension.   4.  Fluid-filled distention of the esophagus, consider gastroesophageal reflux or esophageal dysmotility, similar to prior study   5.  Cardiomegaly.      CT-HEAD W/O   Final Result         1.  No acute intracranial abnormality is identified, there are nonspecific white matter changes, commonly associated with small vessel ischemic disease.  Associated mild cerebral atrophy is noted.             LABORATORY  Lab Results   Component Value Date    SODIUM 134 (L) 04/08/2022    POTASSIUM 4.7 04/08/2022    CHLORIDE 94 (L) 04/08/2022    CO2 29 04/08/2022    GLUCOSE 76 04/08/2022    BUN 10 04/08/2022    CREATININE 2.77 (H) 04/08/2022        Lab Results   Component Value Date    WBC 5.2 04/08/2022    HEMOGLOBIN 10.8 (L) 04/08/2022    HEMATOCRIT 39.7 04/08/2022    PLATELETCT 281 04/08/2022        Total time of the discharge process exceeds 35 minutes.

## 2022-04-08 NOTE — PROGRESS NOTES
[facility] Progress Note     HD treatment ordered by Dr Najjar x 3 hours.  Treatment Start time: 1823          End time: 2123       Net UF 3000 ml, pt request 2-3L UFG    Patient tolerated treatment well. VS stable all through out.     All blood was returned. 15 g HD needle removed from RUE AVF. Dry gauze dressing applied and changed without bleeding issue. + Bruit/Thrill pre-post Treatment. See flow sheet for details.     Report given to BRIANNA Grey RN.

## 2022-04-17 ENCOUNTER — OUTPATIENT INFUSION SERVICES (OUTPATIENT)
Dept: ONCOLOGY | Facility: MEDICAL CENTER | Age: 27
End: 2022-04-17
Attending: INTERNAL MEDICINE
Payer: COMMERCIAL

## 2022-04-17 VITALS
SYSTOLIC BLOOD PRESSURE: 164 MMHG | OXYGEN SATURATION: 99 % | DIASTOLIC BLOOD PRESSURE: 116 MMHG | RESPIRATION RATE: 20 BRPM | TEMPERATURE: 97.9 F | HEART RATE: 84 BPM

## 2022-04-17 DIAGNOSIS — D64.9 SYMPTOMATIC ANEMIA: ICD-10-CM

## 2022-04-17 LAB
ANISOCYTOSIS BLD QL SMEAR: ABNORMAL
BASOPHILS # BLD AUTO: 0 % (ref 0–1.8)
BASOPHILS # BLD: 0 K/UL (ref 0–0.12)
EOSINOPHIL # BLD AUTO: 0.06 K/UL (ref 0–0.51)
EOSINOPHIL NFR BLD: 0.9 % (ref 0–6.9)
ERYTHROCYTE [DISTWIDTH] IN BLOOD BY AUTOMATED COUNT: 69.7 FL (ref 35.9–50)
HCT VFR BLD AUTO: 31.6 % (ref 37–47)
HGB BLD-MCNC: 9.2 G/DL (ref 12–16)
HYPOCHROMIA BLD QL SMEAR: ABNORMAL
LYMPHOCYTES # BLD AUTO: 0.37 K/UL (ref 1–4.8)
LYMPHOCYTES NFR BLD: 5.3 % (ref 22–41)
MACROCYTES BLD QL SMEAR: ABNORMAL
MANUAL DIFF BLD: NORMAL
MCH RBC QN AUTO: 25.6 PG (ref 27–33)
MCHC RBC AUTO-ENTMCNC: 29.1 G/DL (ref 33.6–35)
MCV RBC AUTO: 88 FL (ref 81.4–97.8)
MICROCYTES BLD QL SMEAR: ABNORMAL
MONOCYTES # BLD AUTO: 0.31 K/UL (ref 0–0.85)
MONOCYTES NFR BLD AUTO: 4.4 % (ref 0–13.4)
MORPHOLOGY BLD-IMP: NORMAL
NEUTROPHILS # BLD AUTO: 6.26 K/UL (ref 2–7.15)
NEUTROPHILS NFR BLD: 89.4 % (ref 44–72)
NRBC # BLD AUTO: 0 K/UL
NRBC BLD-RTO: 0 /100 WBC
OVALOCYTES BLD QL SMEAR: NORMAL
PLATELET # BLD AUTO: 252 K/UL (ref 164–446)
PLATELET BLD QL SMEAR: NORMAL
PMV BLD AUTO: 9.7 FL (ref 9–12.9)
POIKILOCYTOSIS BLD QL SMEAR: NORMAL
POLYCHROMASIA BLD QL SMEAR: NORMAL
RBC # BLD AUTO: 3.59 M/UL (ref 4.2–5.4)
RBC BLD AUTO: PRESENT
WBC # BLD AUTO: 7 K/UL (ref 4.8–10.8)

## 2022-04-17 PROCEDURE — 36415 COLL VENOUS BLD VENIPUNCTURE: CPT

## 2022-04-17 PROCEDURE — 85025 COMPLETE CBC W/AUTO DIFF WBC: CPT

## 2022-04-17 PROCEDURE — 85007 BL SMEAR W/DIFF WBC COUNT: CPT

## 2022-04-17 RX ORDER — DIPHENHYDRAMINE HYDROCHLORIDE 50 MG/ML
25 INJECTION INTRAMUSCULAR; INTRAVENOUS PRN
Status: CANCELLED
Start: 2022-04-17

## 2022-04-17 RX ORDER — 0.9 % SODIUM CHLORIDE 0.9 %
3 VIAL (ML) INJECTION PRN
Status: CANCELLED | OUTPATIENT
Start: 2022-04-17

## 2022-04-17 RX ORDER — HEPARIN SODIUM (PORCINE) LOCK FLUSH IV SOLN 100 UNIT/ML 100 UNIT/ML
500 SOLUTION INTRAVENOUS PRN
Status: CANCELLED | OUTPATIENT
Start: 2022-04-17

## 2022-04-17 RX ORDER — SODIUM CHLORIDE 9 MG/ML
INJECTION, SOLUTION INTRAVENOUS CONTINUOUS
Status: CANCELLED | OUTPATIENT
Start: 2022-04-17

## 2022-04-17 RX ORDER — ACETAMINOPHEN 325 MG/1
650 TABLET ORAL ONCE
Status: CANCELLED | OUTPATIENT
Start: 2022-04-17

## 2022-04-17 RX ORDER — 0.9 % SODIUM CHLORIDE 0.9 %
VIAL (ML) INJECTION PRN
Status: CANCELLED | OUTPATIENT
Start: 2022-04-17

## 2022-04-17 RX ORDER — 0.9 % SODIUM CHLORIDE 0.9 %
10 VIAL (ML) INJECTION PRN
Status: CANCELLED | OUTPATIENT
Start: 2022-04-17

## 2022-04-17 RX ORDER — DIPHENHYDRAMINE HCL 25 MG
25 TABLET ORAL ONCE
Status: CANCELLED | OUTPATIENT
Start: 2022-04-17 | End: 2022-04-17

## 2022-04-17 RX ORDER — DIPHENHYDRAMINE HYDROCHLORIDE 50 MG/ML
25 INJECTION INTRAMUSCULAR; INTRAVENOUS PRN
Status: CANCELLED | OUTPATIENT
Start: 2022-04-17

## 2022-04-17 RX ORDER — ACETAMINOPHEN 325 MG/1
650 TABLET ORAL PRN
Status: CANCELLED | OUTPATIENT
Start: 2022-04-17

## 2022-04-17 NOTE — PROGRESS NOTES
Patient to [facility] for labs. Labs drawn by venipuncture in left forearm. HGB 9.2 and platelets 252K, patient does not meet parameters for blood transfusion. Patient notified. Emailed scheduling to cancel the appointment. Patient to home in care of self.

## 2022-04-19 ENCOUNTER — APPOINTMENT (OUTPATIENT)
Dept: ONCOLOGY | Facility: MEDICAL CENTER | Age: 27
End: 2022-04-19
Attending: INTERNAL MEDICINE
Payer: COMMERCIAL

## 2022-04-19 ENCOUNTER — OFFICE VISIT (OUTPATIENT)
Dept: CARDIOLOGY | Facility: MEDICAL CENTER | Age: 27
End: 2022-04-19
Payer: COMMERCIAL

## 2022-04-19 VITALS
HEART RATE: 76 BPM | BODY MASS INDEX: 17.84 KG/M2 | HEIGHT: 66 IN | WEIGHT: 111 LBS | OXYGEN SATURATION: 100 % | DIASTOLIC BLOOD PRESSURE: 88 MMHG | RESPIRATION RATE: 14 BRPM | SYSTOLIC BLOOD PRESSURE: 132 MMHG

## 2022-04-19 DIAGNOSIS — R07.9 CHEST PAIN, UNSPECIFIED TYPE: ICD-10-CM

## 2022-04-19 DIAGNOSIS — Z01.818 PREOPERATIVE EXAMINATION: ICD-10-CM

## 2022-04-19 PROCEDURE — 99214 OFFICE O/P EST MOD 30 MIN: CPT | Performed by: INTERNAL MEDICINE

## 2022-04-19 RX ORDER — CLONIDINE HYDROCHLORIDE 0.3 MG/1
0.3 TABLET ORAL 2 TIMES DAILY
COMMUNITY
Start: 2022-03-26 | End: 2023-01-01

## 2022-04-19 RX ORDER — HYDROMORPHONE HYDROCHLORIDE 2 MG/1
2 TABLET ORAL
COMMUNITY
Start: 2022-03-31 | End: 2023-01-01

## 2022-04-19 ASSESSMENT — ENCOUNTER SYMPTOMS
PND: 0
DECREASED APPETITE: 0
NAUSEA: 0
DEPRESSION: 0
CLAUDICATION: 0
PALPITATIONS: 0
DYSPNEA ON EXERTION: 0
BACK PAIN: 0
SYNCOPE: 0
VOMITING: 0
FEVER: 0
DIARRHEA: 0
FLANK PAIN: 0
DIZZINESS: 0
WEIGHT GAIN: 0
ORTHOPNEA: 0
HEARTBURN: 0
WEIGHT LOSS: 0
ABDOMINAL PAIN: 0
IRREGULAR HEARTBEAT: 0
COUGH: 0
SHORTNESS OF BREATH: 0
NEAR-SYNCOPE: 0
ALTERED MENTAL STATUS: 0
BLURRED VISION: 0
CONSTIPATION: 0

## 2022-04-19 ASSESSMENT — FIBROSIS 4 INDEX: FIB4 SCORE: 0.79

## 2022-04-19 NOTE — PROGRESS NOTES
Cardiology Note    Chief Complaint   Patient presents with   • Shortness of Breath       History of Present Illness: Lily Nicole is a 26 y.o. female PMH SLE, ESRD on HD 2015, AVF, GIB w symptomatic anemia s/p prbc transfusion, nida-thao esophageal tear/esophagitis 5/2020, hx shingles who presents for follow up visit.    Since last visit multiple hospital visits for various complaints. This visit with longstanding chest pressure and dyspnea worsened with prolonged exertion. Pending visit with pulmonary. Was not able to undergo coronary CT since last visit. Describes still on list for renal transplant. Still able to exert and walk upwards of 25-30 min without cardiovascular limitations.     Review of Systems   Constitutional: Negative for decreased appetite, fever, malaise/fatigue, weight gain and weight loss.   HENT: Negative for congestion and nosebleeds.    Eyes: Negative for blurred vision.   Cardiovascular: Negative for chest pain, claudication, dyspnea on exertion, irregular heartbeat, leg swelling, near-syncope, orthopnea, palpitations, paroxysmal nocturnal dyspnea and syncope.   Respiratory: Negative for cough and shortness of breath.    Endocrine: Negative for cold intolerance and heat intolerance.   Skin: Negative for rash.   Musculoskeletal: Negative for back pain.   Gastrointestinal: Negative for abdominal pain, constipation, diarrhea, heartburn, melena, nausea and vomiting.   Genitourinary: Negative for dysuria, flank pain and hematuria.   Neurological: Negative for dizziness.   Psychiatric/Behavioral: Negative for altered mental status and depression.         Past Medical History:   Diagnosis Date   • Anemia 01/17/2018   • AVF (arteriovenous fistula) ([facility])     Right Arm   • Chest pain    • Chest tightness    • Daytime sleepiness    • Dialysis patient ([facility])      dialysis, M,W,F Elizabeth/Joni   • Difficulty breathing    • ESRD (end stage renal disease) on dialysis ([facility]) 01/17/2018    Twan Fu  "  • Gasping for breath    • Heart burn    • Hypertension 01/17/2018    \"Controlled with medication\"   • Indigestion    • Lupus (HCC)    • Migraines 01/17/2018   • Painful breathing    • Palpitations    • Seizure (HCC) 2013    from high blood pressure, reports 1 time event   • Shortness of breath    • Swelling of lower extremity    • Wheezing          Past Surgical History:   Procedure Laterality Date   • OR BRONCHOSCOPY,DIAGNOSTIC Bilateral 5/13/2021    Procedure: BRONCHOSCOPY, BRONCHOALVEOLAR LAVAGE;  Surgeon: William Spangler M.D.;  Location: [facility];  Service: Pulmonary   • OR UPPER GI ENDOSCOPY,DIAGNOSIS N/A 3/19/2021    Procedure: GASTROSCOPY;  Surgeon: Waylon Mcqueen M.D.;  Location: [facility];  Service: Gastroenterology   • OR UPPER GI ENDOSCOPY,CTRL BLEED  3/19/2021    Procedure: GASTROSCOPY, WITH ARGON PLASMA COAGULATION;  Surgeon: Waylon Mcqueen M.D.;  Location: [facility];  Service: Gastroenterology   • OR UPPER GI ENDOSCOPY,DIAGNOSIS  3/5/2021    Procedure: GASTROSCOPY - W/HEMOSTASIS;  Surgeon: Ej Silva M.D.;  Location: [facility];  Service: Gastroenterology   • OR COLONOSCOPY,DIAGNOSTIC  1/8/2021    Procedure: COLONOSCOPY;  Surgeon: Herbert Contreras M.D.;  Location: [facility];  Service: Gastroenterology   • OR UPPER GI ENDOSCOPY,DIAGNOSIS  1/8/2021    Procedure: GASTROSCOPY;  Surgeon: Herbert Contreras M.D.;  Location: [facility];  Service: Gastroenterology   • OR UPPER GI ENDOSCOPY,CTRL BLEED  1/8/2021    Procedure: GASTROSCOPY, WITH ARGON PLASMA COAGULATION;  Surgeon: Herbert Contreras M.D.;  Location: [facility];  Service: Gastroenterology   • OR UPPER GI ENDOSCOPY,CTRL BLEED  11/12/2020    Procedure: GASTROSCOPY, WITH ARGON PLASMA COAGULATION;  Surgeon: Gadiel Whitney M.D.;  Location: [facility];  Service: Gastroenterology   • OR UPPER GI ENDOSCOPY,BIOPSY  11/12/2020    Procedure: GASTROSCOPY, WITH BIOPSY;  " "Surgeon: Gadiel Whitney M.D.;  Location: [facility]ERY [facility];  Service: Gastroenterology   • GASTROSCOPY-ENDO  11/12/2020    Procedure: GASTROSCOPY;  Surgeon: Gadiel Whitney M.D.;  Location: SUR[facility]ERY [facility];  Service: Gastroenterology   • GASTROSCOPY-ENDO  9/18/2020    Procedure: GASTROSCOPY;  Surgeon: Gadiel Whitney M.D.;  Location: SURGERY [facility];  Service: Gastroenterology   • GASTROSCOPY N/A 5/30/2020    Procedure: GASTROSCOPY;  Surgeon: Waylon Mcqueen M.D.;  Location: [facility];  Service: Gastroenterology   • GASTROSCOPY-ENDO  12/9/2019    Procedure: GASTROSCOPY;  Surgeon: aAron Kam M.D.;  Location: [facility];  Service: Gastroenterology   • ANGIOPLASTY  01/17/2018    \"Right Arm AV-Fistulagram & Angioplastyx3\"   • ULI BY LAPAROSCOPY  4/5/2010    Performed by SYED MARTELL at SURGERY [facility]   • AV FISTULA CREATION Right    • OTHER      renal biopsy x 3   • OTHER      bone marrow biopsy         Current Outpatient Medications   Medication Sig Dispense Refill   • cloNIDine (CATAPRES) 0.3 MG Tab Take 0.3 mg by mouth 2 times a day.     • HYDROmorphone (DILAUDID) 2 MG Tab      • ondansetron (ZOFRAN ODT) 4 MG TABLET DISPERSIBLE Take 4 mg by mouth every 6 hours as needed for Nausea.     • acetaminophen (TYLENOL) 500 MG Tab Take 500-1,000 mg by mouth every 6 hours as needed. Indications: Pain     • amLODIPine (NORVASC) 5 MG Tab Take 5 mg by mouth every day.     • mycophenolate sodium (MYFORTIC) 360 MG Tablet Delayed Response tablet Take 360 mg by mouth every evening.     • atenolol (TENORMIN) 25 MG Tab Take 25 mg by mouth every day.     • pantoprazole (PROTONIX) 40 MG Tablet Delayed Response Take 1 tablet by mouth 2 times a day. 30 tablet 3   • cloNIDine (CATAPRES) 0.2 MG/24HR PATCH WEEKLY patch Place 1 Patch on the skin every Friday.     • predniSONE (DELTASONE) 5 MG Tab Take 5 mg by mouth every evening.     • hydroxychloroquine (PLAQUENIL) 200 MG " "Tab Take 200 mg by mouth every day.       No current facility-administered medications for this visit.         Allergies   Allergen Reactions   • Cephalexin Rash     Nausea and rash   Hives  .   • Clindamycin Rash     Nausea and rash     Hive  .   • Methylprednisolone      Anxious  Other reaction(s): Other-Reaction in Comments  Anxious   • Metoprolol Rash and Nausea     Nausea and rash     .   • Compazine      C/o anxiety   • Fentanyl And Related Anxiety   • Hydrocodone-Acetaminophen Rash and Nausea     Generalized rash  Generalized rash   • Maxipime [Cefepime] Itching   • Reglan [Metoclopramide] Anxiety   • Tape Rash     Paper tape is ok         Family History   Problem Relation Age of Onset   • Diabetes Paternal Grandmother          Social History     Socioeconomic History   • Marital status: Single     Spouse name: Not on file   • Number of children: Not on file   • Years of education: Not on file   • Highest education level: Not on file   Occupational History   • Not on file   Tobacco Use   • Smoking status: Never Smoker   • Smokeless tobacco: Never Used   Vaping Use   • Vaping Use: Never used   Substance and Sexual Activity   • Alcohol use: No   • Drug use: No   • Sexual activity: Not on file   Other Topics Concern   • Not on file   Social History Narrative   • Not on file     Social Determinants of Health     Financial Resource Strain: Not on file   Food Insecurity: Not on file   Transportation Needs: Not on file   Physical Activity: Not on file   Stress: Not on file   Social Connections: Not on file   Intimate Partner Violence: Not on file   Housing Stability: Not on file         Physical Exam:  Ambulatory Vitals  /88 (BP Location: Left arm, Patient Position: Sitting, BP Cuff Size: Adult)   Pulse 76   Resp 14   Ht 1.68 m (5' 6.14\")   Wt 50.3 kg (111 lb)   SpO2 100%    BP Readings from Last 4 Encounters:   04/19/22 132/88   04/17/22 (!) 164/116   04/08/22 123/81   04/03/22 139/92     Weight/BMI: " "  Vitals:    04/19/22 1249   BP: 132/88   Weight: 50.3 kg (111 lb)   Height: 1.68 m (5' 6.14\")    Body mass index is 17.84 kg/m².  Wt Readings from Last 4 Encounters:   04/19/22 50.3 kg (111 lb)   04/07/22 51.9 kg (114 lb 6.7 oz)   04/01/22 52.2 kg (115 lb)   03/24/22 51.7 kg (113 lb 15.7 oz)     Physical Exam  Constitutional:       Appearance: Normal appearance.   HENT:      Head: Normocephalic and atraumatic.   Eyes:      Extraocular Movements: Extraocular movements intact.      Pupils: Pupils are equal, round, and reactive to light.   Cardiovascular:      Rate and Rhythm: Normal rate and regular rhythm.      Pulses: Normal pulses.      Heart sounds: No murmur heard.    No friction rub. No gallop.   Pulmonary:      Effort: Pulmonary effort is normal.      Breath sounds: Normal breath sounds.   Abdominal:      General: Abdomen is flat.      Palpations: Abdomen is soft.   Musculoskeletal:         General: Normal range of motion.      Cervical back: Normal range of motion and neck supple.   Skin:     General: Skin is warm and dry.   Neurological:      General: No focal deficit present.      Mental Status: She is alert and oriented to person, place, and time. Mental status is at baseline.         Lab Data Review:  Lab Results   Component Value Date/Time    CHOLSTRLTOT 46 (L) 01/05/2011 05:30 PM    LDL 14 01/05/2011 04:15 AM    HDL 16 (L) 01/05/2011 04:15 AM    TRIGLYCERIDE 64 01/05/2011 05:30 PM       Lab Results   Component Value Date/Time    SODIUM 134 (L) 04/08/2022 02:09 AM    POTASSIUM 4.7 04/08/2022 02:09 AM    CHLORIDE 94 (L) 04/08/2022 02:09 AM    CO2 29 04/08/2022 02:09 AM    GLUCOSE 76 04/08/2022 02:09 AM    BUN 10 04/08/2022 02:09 AM    CREATININE 2.77 (H) 04/08/2022 02:09 AM     CrCl cannot be calculated (Patient's most recent lab result is older than the maximum 7 days allowed.).  Lab Results   Component Value Date/Time    ALKPHOSPHAT 93 04/06/2022 10:35 PM    ASTSGOT 25 04/06/2022 10:35 PM    ALTSGPT 9 " 04/06/2022 10:35 PM    TBILIRUBIN 0.4 04/06/2022 10:35 PM      Lab Results   Component Value Date/Time    WBC 7.0 04/17/2022 12:08 PM     Lab Results   Component Value Date/Time    HBA1C 4.5 10/15/2019 01:00 PM     No components found for: TROP      Cardiac Imaging and Procedures Review:      TTE 1/2020  CONCLUSIONS  Prior echo 7/9/18; compared to the report of the study done - there has   been no significant change.   Normal left ventricular systolic function.  Left ventricular ejection fraction is visually estimated to be 60%.  Mild tricuspid regurgitation.  Estimated right ventricular systolic pressure is 25 mmHg.  No evidence of right to left shunt by agitated saline challenge.    Treadmill EKG 4/21/20  Patient exercised for 5 minutes, 50 seconds, and 7 METs.  100% of MPHR: 198  90% of MPHR: 178  85% of MPHR: 168  Peak Heart Rate Achieved: 140  70 % of MPHR.  Maximum /80 mmHg  Functional aerobic impairment: (40) Sedentary  Provider conclusions and analysis:  Borderline exercise capacity  No evidence of ischemic ECG changes at peak stress  At peak stress, rhythm is sinus tachycardia  No arrhythmia noted.    TTE 4/7/22  CONCLUSIONS  Normal left ventricular size and systolic function.  The left ventricular ejection fraction is visually estimated to be 60%.  Mild mitral regurgitation.  Estimated right ventricular systolic pressure is 35 mmHg.    Medical Decision Making:  Problem List Items Addressed This Visit     Chest pain    Relevant Orders    CT-CTA HEART W/3D IMAGE    Preoperative examination        HTN - controlled. Continue current regimen. Can increase amlodipine 5mg if blood pressure sustains elevated >140/90    Hx chest pain, elevated troponin, preoperative evaluation for renal transplant - check CCTA. METS >4, RCRI = 0, no active cardiac conditions. If coronaries without significant obstruction, no further intervention would modify perioperative risk.     It was my pleasure to meet with Ms. Resendiz  Corey.

## 2022-04-20 ENCOUNTER — APPOINTMENT (OUTPATIENT)
Dept: NEUROLOGY | Facility: MEDICAL CENTER | Age: 27
End: 2022-04-20
Attending: PSYCHIATRY & NEUROLOGY
Payer: COMMERCIAL

## 2022-04-21 ENCOUNTER — TELEPHONE (OUTPATIENT)
Dept: NEPHROLOGY | Facility: MEDICAL CENTER | Age: 27
End: 2022-04-21
Payer: COMMERCIAL

## 2022-04-22 DIAGNOSIS — Z01.812 PRE-PROCEDURE LAB EXAM: ICD-10-CM

## 2022-04-22 NOTE — PROGRESS NOTES
Order placed for outpatient, non fasting creatinine blood draw to check kidney function within 30 days prior to contrast administration for coronary CTA if determined medically necessary by CT staff.     Order placed for outpatient pregnancy test prior to coronary CTA if determined medically necessary by CT staff.     This CT study may be scheduled through [facility] Imaging Scheduling at , option 2.

## 2022-04-28 ENCOUNTER — APPOINTMENT (OUTPATIENT)
Dept: NEPHROLOGY | Facility: MEDICAL CENTER | Age: 27
End: 2022-04-28
Payer: COMMERCIAL

## 2022-05-01 ENCOUNTER — OUTPATIENT INFUSION SERVICES (OUTPATIENT)
Dept: ONCOLOGY | Facility: MEDICAL CENTER | Age: 27
End: 2022-05-01
Attending: INTERNAL MEDICINE
Payer: COMMERCIAL

## 2022-05-01 DIAGNOSIS — D64.9 SYMPTOMATIC ANEMIA: ICD-10-CM

## 2022-05-01 LAB
ABO GROUP BLD: NORMAL
ANISOCYTOSIS BLD QL SMEAR: ABNORMAL
BASOPHILS # BLD AUTO: 0.6 % (ref 0–1.8)
BASOPHILS # BLD: 0.03 K/UL (ref 0–0.12)
BLD GP AB SCN SERPL QL: NORMAL
COMMENT 1642: NORMAL
EOSINOPHIL # BLD AUTO: 0.04 K/UL (ref 0–0.51)
EOSINOPHIL NFR BLD: 0.8 % (ref 0–6.9)
ERYTHROCYTE [DISTWIDTH] IN BLOOD BY AUTOMATED COUNT: 73.2 FL (ref 35.9–50)
HCT VFR BLD AUTO: 22 % (ref 37–47)
HGB BLD-MCNC: 6.4 G/DL (ref 12–16)
IMM GRANULOCYTES # BLD AUTO: 0.02 K/UL (ref 0–0.11)
IMM GRANULOCYTES NFR BLD AUTO: 0.4 % (ref 0–0.9)
LYMPHOCYTES # BLD AUTO: 0.52 K/UL (ref 1–4.8)
LYMPHOCYTES NFR BLD: 11 % (ref 22–41)
MCH RBC QN AUTO: 26.7 PG (ref 27–33)
MCHC RBC AUTO-ENTMCNC: 29.1 G/DL (ref 33.6–35)
MCV RBC AUTO: 91.7 FL (ref 81.4–97.8)
MICROCYTES BLD QL SMEAR: ABNORMAL
MONOCYTES # BLD AUTO: 0.13 K/UL (ref 0–0.85)
MONOCYTES NFR BLD AUTO: 2.7 % (ref 0–13.4)
MORPHOLOGY BLD-IMP: NORMAL
NEUTROPHILS # BLD AUTO: 3.99 K/UL (ref 2–7.15)
NEUTROPHILS NFR BLD: 84.5 % (ref 44–72)
NRBC # BLD AUTO: 0 K/UL
NRBC BLD-RTO: 0 /100 WBC
PLATELET # BLD AUTO: ABNORMAL K/UL (ref 164–446)
PMV BLD AUTO: 10.6 FL (ref 9–12.9)
RBC # BLD AUTO: 2.4 M/UL (ref 4.2–5.4)
RBC BLD AUTO: PRESENT
RH BLD: NORMAL
WBC # BLD AUTO: 4.7 K/UL (ref 4.8–10.8)

## 2022-05-01 PROCEDURE — 86902 BLOOD TYPE ANTIGEN DONOR EA: CPT | Mod: 91

## 2022-05-01 PROCEDURE — 85025 COMPLETE CBC W/AUTO DIFF WBC: CPT

## 2022-05-01 PROCEDURE — 86850 RBC ANTIBODY SCREEN: CPT

## 2022-05-01 PROCEDURE — 86901 BLOOD TYPING SEROLOGIC RH(D): CPT

## 2022-05-01 PROCEDURE — 86900 BLOOD TYPING SEROLOGIC ABO: CPT

## 2022-05-01 PROCEDURE — 86922 COMPATIBILITY TEST ANTIGLOB: CPT

## 2022-05-01 PROCEDURE — 36415 COLL VENOUS BLD VENIPUNCTURE: CPT

## 2022-05-01 RX ORDER — ACETAMINOPHEN 325 MG/1
650 TABLET ORAL PRN
Status: CANCELLED | OUTPATIENT
Start: 2022-05-01

## 2022-05-01 RX ORDER — DIPHENHYDRAMINE HCL 25 MG
25 TABLET ORAL ONCE
Status: CANCELLED | OUTPATIENT
Start: 2022-05-01 | End: 2022-05-01

## 2022-05-01 RX ORDER — 0.9 % SODIUM CHLORIDE 0.9 %
VIAL (ML) INJECTION PRN
Status: CANCELLED | OUTPATIENT
Start: 2022-05-01

## 2022-05-01 RX ORDER — SODIUM CHLORIDE 9 MG/ML
INJECTION, SOLUTION INTRAVENOUS CONTINUOUS
Status: CANCELLED | OUTPATIENT
Start: 2022-05-01

## 2022-05-01 RX ORDER — 0.9 % SODIUM CHLORIDE 0.9 %
10 VIAL (ML) INJECTION PRN
Status: CANCELLED | OUTPATIENT
Start: 2022-05-01

## 2022-05-01 RX ORDER — 0.9 % SODIUM CHLORIDE 0.9 %
3 VIAL (ML) INJECTION PRN
Status: CANCELLED | OUTPATIENT
Start: 2022-05-01

## 2022-05-01 RX ORDER — HEPARIN SODIUM (PORCINE) LOCK FLUSH IV SOLN 100 UNIT/ML 100 UNIT/ML
500 SOLUTION INTRAVENOUS PRN
Status: CANCELLED | OUTPATIENT
Start: 2022-05-01

## 2022-05-01 RX ORDER — ACETAMINOPHEN 325 MG/1
650 TABLET ORAL ONCE
Status: CANCELLED | OUTPATIENT
Start: 2022-05-01

## 2022-05-01 RX ORDER — DIPHENHYDRAMINE HYDROCHLORIDE 50 MG/ML
25 INJECTION INTRAMUSCULAR; INTRAVENOUS PRN
Status: CANCELLED | OUTPATIENT
Start: 2022-05-01

## 2022-05-01 RX ORDER — DIPHENHYDRAMINE HYDROCHLORIDE 50 MG/ML
25 INJECTION INTRAMUSCULAR; INTRAVENOUS PRN
Status: CANCELLED
Start: 2022-05-01

## 2022-05-01 NOTE — PROGRESS NOTES
Pt arrived via wheelchair for CBC/COD.  POC discussed.  Labs drawn as ordered from [facility].  Results reviewed, pt does meet parameters for 2 units PRBCs.  Pt aware to return for appointment on Tuesday for blood products.  COD sent for processing.  Pt discharged in NAD with family member.

## 2022-05-03 ENCOUNTER — OUTPATIENT INFUSION SERVICES (OUTPATIENT)
Dept: ONCOLOGY | Facility: MEDICAL CENTER | Age: 27
End: 2022-05-03
Attending: INTERNAL MEDICINE
Payer: COMMERCIAL

## 2022-05-03 VITALS
DIASTOLIC BLOOD PRESSURE: 104 MMHG | TEMPERATURE: 97.7 F | HEART RATE: 77 BPM | RESPIRATION RATE: 18 BRPM | SYSTOLIC BLOOD PRESSURE: 162 MMHG | HEIGHT: 66 IN | BODY MASS INDEX: 17.68 KG/M2 | OXYGEN SATURATION: 100 % | WEIGHT: 110.01 LBS

## 2022-05-03 DIAGNOSIS — D64.9 SYMPTOMATIC ANEMIA: ICD-10-CM

## 2022-05-03 PROCEDURE — P9016 RBC LEUKOCYTES REDUCED: HCPCS

## 2022-05-03 PROCEDURE — 306780 HCHG STAT FOR TRANSFUSION PER CASE

## 2022-05-03 PROCEDURE — 96374 THER/PROPH/DIAG INJ IV PUSH: CPT

## 2022-05-03 PROCEDURE — 700102 HCHG RX REV CODE 250 W/ 637 OVERRIDE(OP): Performed by: INTERNAL MEDICINE

## 2022-05-03 PROCEDURE — 700111 HCHG RX REV CODE 636 W/ 250 OVERRIDE (IP): Performed by: INTERNAL MEDICINE

## 2022-05-03 PROCEDURE — 700105 HCHG RX REV CODE 258: Performed by: INTERNAL MEDICINE

## 2022-05-03 PROCEDURE — 36430 TRANSFUSION BLD/BLD COMPNT: CPT

## 2022-05-03 PROCEDURE — 86945 BLOOD PRODUCT/IRRADIATION: CPT

## 2022-05-03 PROCEDURE — A9270 NON-COVERED ITEM OR SERVICE: HCPCS | Performed by: INTERNAL MEDICINE

## 2022-05-03 RX ORDER — DIPHENHYDRAMINE HYDROCHLORIDE 50 MG/ML
25 INJECTION INTRAMUSCULAR; INTRAVENOUS PRN
Status: CANCELLED
Start: 2022-05-03

## 2022-05-03 RX ORDER — SODIUM CHLORIDE 9 MG/ML
INJECTION, SOLUTION INTRAVENOUS CONTINUOUS
Status: CANCELLED | OUTPATIENT
Start: 2022-05-03

## 2022-05-03 RX ORDER — 0.9 % SODIUM CHLORIDE 0.9 %
VIAL (ML) INJECTION PRN
Status: CANCELLED | OUTPATIENT
Start: 2022-05-03

## 2022-05-03 RX ORDER — ACETAMINOPHEN 325 MG/1
650 TABLET ORAL ONCE
Status: COMPLETED | OUTPATIENT
Start: 2022-05-03 | End: 2022-05-03

## 2022-05-03 RX ORDER — 0.9 % SODIUM CHLORIDE 0.9 %
10 VIAL (ML) INJECTION PRN
Status: CANCELLED | OUTPATIENT
Start: 2022-05-03

## 2022-05-03 RX ORDER — HEPARIN SODIUM (PORCINE) LOCK FLUSH IV SOLN 100 UNIT/ML 100 UNIT/ML
500 SOLUTION INTRAVENOUS PRN
Status: CANCELLED | OUTPATIENT
Start: 2022-05-03

## 2022-05-03 RX ORDER — ACETAMINOPHEN 325 MG/1
650 TABLET ORAL PRN
Status: CANCELLED | OUTPATIENT
Start: 2022-05-03

## 2022-05-03 RX ORDER — 0.9 % SODIUM CHLORIDE 0.9 %
3 VIAL (ML) INJECTION PRN
Status: CANCELLED | OUTPATIENT
Start: 2022-05-03

## 2022-05-03 RX ORDER — DIPHENHYDRAMINE HCL 25 MG
25 TABLET ORAL ONCE
Status: DISCONTINUED | OUTPATIENT
Start: 2022-05-03 | End: 2022-05-03 | Stop reason: HOSPADM

## 2022-05-03 RX ORDER — ACETAMINOPHEN 325 MG/1
650 TABLET ORAL ONCE
Status: CANCELLED | OUTPATIENT
Start: 2022-05-03

## 2022-05-03 RX ORDER — DIPHENHYDRAMINE HCL 25 MG
25 TABLET ORAL ONCE
Status: CANCELLED | OUTPATIENT
Start: 2022-05-03 | End: 2022-05-03

## 2022-05-03 RX ORDER — DIPHENHYDRAMINE HYDROCHLORIDE 50 MG/ML
25 INJECTION INTRAMUSCULAR; INTRAVENOUS PRN
Status: CANCELLED | OUTPATIENT
Start: 2022-05-03

## 2022-05-03 RX ADMIN — ACETAMINOPHEN 650 MG: 325 TABLET ORAL at 08:31

## 2022-05-03 RX ADMIN — DIPHENHYDRAMINE HYDROCHLORIDE 25 MG: 50 INJECTION, SOLUTION INTRAMUSCULAR; INTRAVENOUS at 08:35

## 2022-05-04 ENCOUNTER — OUTPATIENT INFUSION SERVICES (OUTPATIENT)
Dept: ONCOLOGY | Facility: MEDICAL CENTER | Age: 27
End: 2022-05-04
Attending: INTERNAL MEDICINE
Payer: COMMERCIAL

## 2022-05-04 VITALS
BODY MASS INDEX: 17.72 KG/M2 | OXYGEN SATURATION: 100 % | TEMPERATURE: 96.9 F | SYSTOLIC BLOOD PRESSURE: 154 MMHG | WEIGHT: 110.23 LBS | RESPIRATION RATE: 18 BRPM | HEART RATE: 75 BPM | HEIGHT: 66 IN | DIASTOLIC BLOOD PRESSURE: 107 MMHG

## 2022-05-04 DIAGNOSIS — D64.9 SYMPTOMATIC ANEMIA: ICD-10-CM

## 2022-05-04 LAB
BARCODED ABORH UBTYP: 5100
BARCODED PRD CODE UBPRD: NORMAL
BARCODED UNIT NUM UBUNT: NORMAL
BASOPHILS # BLD AUTO: 0.8 % (ref 0–1.8)
BASOPHILS # BLD: 0.03 K/UL (ref 0–0.12)
COMPONENT R 8504R: NORMAL
EOSINOPHIL # BLD AUTO: 0.12 K/UL (ref 0–0.51)
EOSINOPHIL NFR BLD: 3.1 % (ref 0–6.9)
ERYTHROCYTE [DISTWIDTH] IN BLOOD BY AUTOMATED COUNT: 70.3 FL (ref 35.9–50)
HCT VFR BLD AUTO: 24.5 % (ref 37–47)
HGB BLD-MCNC: 7.3 G/DL (ref 12–16)
IMM GRANULOCYTES # BLD AUTO: 0.01 K/UL (ref 0–0.11)
IMM GRANULOCYTES NFR BLD AUTO: 0.3 % (ref 0–0.9)
LYMPHOCYTES # BLD AUTO: 0.49 K/UL (ref 1–4.8)
LYMPHOCYTES NFR BLD: 12.8 % (ref 22–41)
MCH RBC QN AUTO: 26.4 PG (ref 27–33)
MCHC RBC AUTO-ENTMCNC: 29.8 G/DL (ref 33.6–35)
MCV RBC AUTO: 88.8 FL (ref 81.4–97.8)
MONOCYTES # BLD AUTO: 0.27 K/UL (ref 0–0.85)
MONOCYTES NFR BLD AUTO: 7.1 % (ref 0–13.4)
NEUTROPHILS # BLD AUTO: 2.9 K/UL (ref 2–7.15)
NEUTROPHILS NFR BLD: 75.9 % (ref 44–72)
NRBC # BLD AUTO: 0 K/UL
NRBC BLD-RTO: 0 /100 WBC
PLATELET # BLD AUTO: 269 K/UL (ref 164–446)
PMV BLD AUTO: 9.4 FL (ref 9–12.9)
PRODUCT TYPE UPROD: NORMAL
RBC # BLD AUTO: 2.76 M/UL (ref 4.2–5.4)
UNIT STATUS USTAT: NORMAL
WBC # BLD AUTO: 3.8 K/UL (ref 4.8–10.8)

## 2022-05-04 PROCEDURE — 86922 COMPATIBILITY TEST ANTIGLOB: CPT

## 2022-05-04 PROCEDURE — 36415 COLL VENOUS BLD VENIPUNCTURE: CPT

## 2022-05-04 PROCEDURE — 85025 COMPLETE CBC W/AUTO DIFF WBC: CPT

## 2022-05-04 RX ORDER — 0.9 % SODIUM CHLORIDE 0.9 %
VIAL (ML) INJECTION PRN
Status: CANCELLED | OUTPATIENT
Start: 2022-05-04

## 2022-05-04 RX ORDER — SODIUM CHLORIDE 9 MG/ML
INJECTION, SOLUTION INTRAVENOUS CONTINUOUS
Status: DISCONTINUED | OUTPATIENT
Start: 2022-05-04 | End: 2022-05-04

## 2022-05-04 RX ORDER — DIPHENHYDRAMINE HYDROCHLORIDE 50 MG/ML
25 INJECTION INTRAMUSCULAR; INTRAVENOUS PRN
Status: CANCELLED | OUTPATIENT
Start: 2022-05-04

## 2022-05-04 RX ORDER — 0.9 % SODIUM CHLORIDE 0.9 %
10 VIAL (ML) INJECTION PRN
Status: CANCELLED | OUTPATIENT
Start: 2022-05-04

## 2022-05-04 RX ORDER — ACETAMINOPHEN 325 MG/1
650 TABLET ORAL ONCE
Status: CANCELLED | OUTPATIENT
Start: 2022-05-04

## 2022-05-04 RX ORDER — DIPHENHYDRAMINE HYDROCHLORIDE 50 MG/ML
25 INJECTION INTRAMUSCULAR; INTRAVENOUS PRN
Status: CANCELLED
Start: 2022-05-04

## 2022-05-04 RX ORDER — SODIUM CHLORIDE 9 MG/ML
INJECTION, SOLUTION INTRAVENOUS CONTINUOUS
Status: CANCELLED | OUTPATIENT
Start: 2022-05-04

## 2022-05-04 RX ORDER — HEPARIN SODIUM (PORCINE) LOCK FLUSH IV SOLN 100 UNIT/ML 100 UNIT/ML
500 SOLUTION INTRAVENOUS PRN
Status: CANCELLED | OUTPATIENT
Start: 2022-05-04

## 2022-05-04 RX ORDER — ACETAMINOPHEN 325 MG/1
650 TABLET ORAL ONCE
Status: DISCONTINUED | OUTPATIENT
Start: 2022-05-04 | End: 2022-05-04

## 2022-05-04 RX ORDER — ACETAMINOPHEN 325 MG/1
650 TABLET ORAL PRN
Status: CANCELLED | OUTPATIENT
Start: 2022-05-04

## 2022-05-04 RX ORDER — 0.9 % SODIUM CHLORIDE 0.9 %
3 VIAL (ML) INJECTION PRN
Status: CANCELLED | OUTPATIENT
Start: 2022-05-04

## 2022-05-04 NOTE — PROGRESS NOTES
Pt ambulatory w/ her mom as caregiver for 2nd unit of PRBC.  Pt received 1st unit yesterday, but BP was too elevated for 2nd unit, pt was told to come back this morning.  Pt's BP still elevated at 154/107.  Pt reports she received dialysis yesterday but only for 90 minutes, pt reports 2L of fluid was removed.  Spoke to Dr Garcia re: pt's BP.  Per Dr Garcia please dillon pt's H&H and call him with results and how to proceed--TORB.  H&H drawn from pt's 24G PIV, specimen sent to lab.  Pt's Hgb is 7.3.  Dr Garcia notified of lab result, per Dr Garcia ok to hold pt's 2nd unit of blood and make sure pt comes [id number] for dillon--TORB.  Pt and mom informed of POC, they are in agreement.  PIV removed, gauze bandage applied.  Pt left on foot in care of her mom in NAD.  E-mail sent to scheduling for future appts.

## 2022-05-04 NOTE — PROGRESS NOTES
iLly arrives ambulatory on home O2 with mother (caregiver) to IS for blood transfusion of 2 units PRBC for Hgb 6.4 drawn 05/01/2022.  Lily voices no complaints.  PIV placed to left FA, brisk blood return observed.  Pre-medications administered.  First unit PRBC administered per policy.      BP noted to be increasing with transfusion.  Post transfusion /104.  Call placed to Dr Garcia regarding BP.  Order received to only administer 1 unit PRBC today and allow Lily to go for dialysis appointment and return for 2nd unit tomorrow to avoid fluid overload.  PIV flushed, + blood return observed.  PIV wrapped in gauze and protective coban for tomorrows use.  Lily discharged with mother in NAD, returns tomorrow for appointment.      2nd unit PRBC available in blood bank for tomorrows transfusion.  BB aware

## 2022-05-10 ENCOUNTER — OFFICE VISIT (OUTPATIENT)
Dept: NEUROLOGY | Facility: MEDICAL CENTER | Age: 27
End: 2022-05-10
Attending: NURSE PRACTITIONER
Payer: COMMERCIAL

## 2022-05-10 VITALS
RESPIRATION RATE: 16 BRPM | BODY MASS INDEX: 17.72 KG/M2 | DIASTOLIC BLOOD PRESSURE: 72 MMHG | TEMPERATURE: 96.5 F | HEIGHT: 67 IN | OXYGEN SATURATION: 94 % | HEART RATE: 74 BPM | SYSTOLIC BLOOD PRESSURE: 128 MMHG | WEIGHT: 112.88 LBS

## 2022-05-10 DIAGNOSIS — G43.709 CHRONIC MIGRAINE WITHOUT AURA WITHOUT STATUS MIGRAINOSUS, NOT INTRACTABLE: ICD-10-CM

## 2022-05-10 PROCEDURE — 99212 OFFICE O/P EST SF 10 MIN: CPT | Performed by: NURSE PRACTITIONER

## 2022-05-10 PROCEDURE — 99213 OFFICE O/P EST LOW 20 MIN: CPT | Performed by: NURSE PRACTITIONER

## 2022-05-10 RX ORDER — ONDANSETRON 4 MG/1
TABLET, FILM COATED ORAL
COMMUNITY
Start: 2022-05-03 | End: 2022-06-10

## 2022-05-10 ASSESSMENT — ENCOUNTER SYMPTOMS
DIZZINESS: 1
DOUBLE VISION: 0
SINUS PAIN: 0
TINGLING: 0
NAUSEA: 1
SENSORY CHANGE: 0
BLURRED VISION: 0
COUGH: 0
NERVOUS/ANXIOUS: 1
DEPRESSION: 0
BACK PAIN: 1
WEAKNESS: 1
VOMITING: 1
HEADACHES: 1

## 2022-05-10 ASSESSMENT — PAIN SCALES - GENERAL: PAINLEVEL: 9=SEVERE PAIN

## 2022-05-10 ASSESSMENT — FIBROSIS 4 INDEX: FIB4 SCORE: 0.74

## 2022-05-10 NOTE — PROGRESS NOTES
Subjective     Lily Nicole is a 26 y.o. female who presents as a new patent for chronic migraine.    She was referred by her PCP, Dr. Matthew    She is seeing pain management and taking Dilaudid.    When she was referred to us, she was getting daily headaches, now she is only having 1 headache a month that lasts for 2 days.    PMH:   Low back pain, HTN, lupus, renal failure on dialysis.    Social:  Denies alcohol, tobacco or drug use.     Age at Onset:  24  Triggers:  Anxiety , stress  Alleviating factors:  Laying down,dark room, ice.   Meds tried and result:         Preventative:  Medication Dose/length of treatment Result/side effects   None                                                      Abortive:   Medication Dose/length of treatment Result/side effects   dilaudid  helps   Tylenol  Didn't work                                        Hormones:  None   Caffeine use:  1 cup of coffee daily   OTC medications--frequency: none   How many days per month: 2/30  Missed days of school/work in past 6  Months  N/A  Characteristics:                   A) Location: right parietal             B)Duration: 2 days             C)Intensity:  10/10              D) Quality of pain:  stabbing              E) Associated Symptoms:  none  N&V: both   Photo/phonophobia:  Both   Aura: none   Prodrome:  None   ER/Urgent care visits in past 6 months:  2  Sleep schedule: not on a regular schedule      Review of Systems   HENT: Negative for congestion and sinus pain.    Eyes: Negative for blurred vision and double vision.   Respiratory: Negative for cough.    Cardiovascular: Negative for chest pain.   Gastrointestinal: Positive for nausea and vomiting.   Musculoskeletal: Positive for back pain.   Neurological: Positive for dizziness, weakness and headaches. Negative for tingling and sensory change.   Psychiatric/Behavioral: Negative for depression. The patient is nervous/anxious.               Objective      I personally reviewed  "imaging below and agree with findings:    CT head 4/7/2022  1.  No acute intracranial abnormality is identified, there are nonspecific white matter changes, commonly associated with small vessel ischemic disease.  Associated mild cerebral atrophy is noted.    /72 (BP Location: Left arm, Patient Position: Sitting, BP Cuff Size: Adult)   Pulse 74   Temp 35.8 °C (96.5 °F) (Temporal)   Resp 16   Ht 1.702 m (5' 7\")   Wt 51.2 kg (112 lb 14 oz)   SpO2 94%   BMI 17.68 kg/m²      PHYSICAL ASSESSMENT  Constitutional:  Alert, no apparent distress,  Psych:   mood and affect WNL  Muskuloskeletal:  Moves all extremities equally, strength 5/5  BUE/BLE flexors/extensors, no drift  NEUROLOGICAL ASSESSMENT  Oriented X 4, speech fluent, naming and memory intact  CN II: Visual fields are full to confrontation. Fundoscopic exam is normal with sharp discs and no vascular changes. Pupils are 4 mm and briskly reactive to light.   CN III: IV, VI  EOMs intact, no ptosis  CN V: Facial sensation is intact in all 3 divisions bilaterally. Corneal responses are intact.  CN VII: Face is symmetric with normal eye closure and smile.  CN VIII Hearing is normal to rubbing fingers  CN IX, X: Palate elevates symmetrically. Phonation is normal.  CN XI: Head turning and shoulder shrug are intact  CN XII: Tongue is midline with normal movements and no atrophy.                           Sensation equal bilaterally                 No limb ataxia with finger to nose and heel to shin                 Ambulates with steady gait.                               Biceps,brachioradialis, tricep, and patellar reflexes all 2+     Cardiovascular:    S1S2, no abnormal rhythm auscultated, no peripheral edema  Neck:                     No carotid bruits noted   Pulmonary:            Respirations easy, lungs clear to auscultation all fields., she is on 3 liters of O2 per NC     Skin:                     No obvious rashes.          Assessment & Plan     1. " Chronic migraine without aura without status migrainosus, not intractable         I would not add any medications today, she is well controled, only 2 headache days per month.  I would not even recommend adding supplements as she is on dialysis.    If headaches become more frequent, she should follow up with me.       I counseled patient on migraine triggers, frequency, etc.     Follow up PRN

## 2022-05-11 ENCOUNTER — APPOINTMENT (OUTPATIENT)
Dept: ONCOLOGY | Facility: MEDICAL CENTER | Age: 27
End: 2022-05-11
Attending: INTERNAL MEDICINE
Payer: COMMERCIAL

## 2022-05-18 ENCOUNTER — APPOINTMENT (OUTPATIENT)
Dept: ONCOLOGY | Facility: MEDICAL CENTER | Age: 27
End: 2022-05-18
Attending: INTERNAL MEDICINE
Payer: COMMERCIAL

## 2022-05-21 ENCOUNTER — HOSPITAL ENCOUNTER (EMERGENCY)
Facility: MEDICAL CENTER | Age: 27
End: 2022-05-22
Attending: EMERGENCY MEDICINE
Payer: COMMERCIAL

## 2022-05-21 DIAGNOSIS — F41.9 ANXIETY: ICD-10-CM

## 2022-05-21 DIAGNOSIS — M32.14 SYSTEMIC LUPUS ERYTHEMATOSUS WITH GLOMERULAR DISEASE, UNSPECIFIED SLE TYPE (HCC): ICD-10-CM

## 2022-05-21 DIAGNOSIS — D64.9 ANEMIA, UNSPECIFIED TYPE: Primary | ICD-10-CM

## 2022-05-21 DIAGNOSIS — Z51.89 ENCOUNTER FOR BLOOD TRANSFUSION: ICD-10-CM

## 2022-05-21 PROCEDURE — 99284 EMERGENCY DEPT VISIT MOD MDM: CPT

## 2022-05-21 ASSESSMENT — FIBROSIS 4 INDEX: FIB4 SCORE: 0.74

## 2022-05-22 VITALS
TEMPERATURE: 98.4 F | OXYGEN SATURATION: 97 % | BODY MASS INDEX: 18.1 KG/M2 | RESPIRATION RATE: 18 BRPM | WEIGHT: 115.3 LBS | HEART RATE: 96 BPM | HEIGHT: 67 IN | SYSTOLIC BLOOD PRESSURE: 190 MMHG | DIASTOLIC BLOOD PRESSURE: 121 MMHG

## 2022-05-22 LAB
ABO GROUP BLD: NORMAL
ALBUMIN SERPL BCP-MCNC: 3.1 G/DL (ref 3.2–4.9)
ALBUMIN/GLOB SERPL: 0.6 G/DL
ALP SERPL-CCNC: 94 U/L (ref 30–99)
ALT SERPL-CCNC: 8 U/L (ref 2–50)
ANION GAP SERPL CALC-SCNC: 9 MMOL/L (ref 7–16)
ANISOCYTOSIS BLD QL SMEAR: ABNORMAL
AST SERPL-CCNC: 22 U/L (ref 12–45)
BARCODED ABORH UBTYP: 5100
BARCODED PRD CODE UBPRD: NORMAL
BARCODED UNIT NUM UBUNT: NORMAL
BASOPHILS # BLD AUTO: 0.6 % (ref 0–1.8)
BASOPHILS # BLD: 0.03 K/UL (ref 0–0.12)
BILIRUB SERPL-MCNC: 0.3 MG/DL (ref 0.1–1.5)
BLD GP AB SCN SERPL QL: NORMAL
BUN SERPL-MCNC: 35 MG/DL (ref 8–22)
CALCIUM SERPL-MCNC: 8.9 MG/DL (ref 8.4–10.2)
CHLORIDE SERPL-SCNC: 86 MMOL/L (ref 96–112)
CO2 SERPL-SCNC: 33 MMOL/L (ref 20–33)
COMMENT 1642: NORMAL
COMPONENT R 8504R: NORMAL
CREAT SERPL-MCNC: 4.44 MG/DL (ref 0.5–1.4)
EKG IMPRESSION: NORMAL
EOSINOPHIL # BLD AUTO: 0.09 K/UL (ref 0–0.51)
EOSINOPHIL NFR BLD: 1.9 % (ref 0–6.9)
ERYTHROCYTE [DISTWIDTH] IN BLOOD BY AUTOMATED COUNT: 65.3 FL (ref 35.9–50)
GFR SERPLBLD CREATININE-BSD FMLA CKD-EPI: 13 ML/MIN/1.73 M 2
GLOBULIN SER CALC-MCNC: 5.5 G/DL (ref 1.9–3.5)
GLUCOSE SERPL-MCNC: 90 MG/DL (ref 65–99)
HCG SERPL QL: NEGATIVE
HCT VFR BLD AUTO: 22.4 % (ref 37–47)
HGB BLD-MCNC: 6.4 G/DL (ref 12–16)
HYPOCHROMIA BLD QL SMEAR: ABNORMAL
IMM GRANULOCYTES # BLD AUTO: 0.03 K/UL (ref 0–0.11)
IMM GRANULOCYTES NFR BLD AUTO: 0.6 % (ref 0–0.9)
LYMPHOCYTES # BLD AUTO: 0.65 K/UL (ref 1–4.8)
LYMPHOCYTES NFR BLD: 13.7 % (ref 22–41)
MACROCYTES BLD QL SMEAR: ABNORMAL
MCH RBC QN AUTO: 26.3 PG (ref 27–33)
MCHC RBC AUTO-ENTMCNC: 28.6 G/DL (ref 33.6–35)
MCV RBC AUTO: 92.2 FL (ref 81.4–97.8)
MONOCYTES # BLD AUTO: 0.19 K/UL (ref 0–0.85)
MONOCYTES NFR BLD AUTO: 4 % (ref 0–13.4)
NEUTROPHILS # BLD AUTO: 3.77 K/UL (ref 2–7.15)
NEUTROPHILS NFR BLD: 79.2 % (ref 44–72)
NRBC # BLD AUTO: 0 K/UL
NRBC BLD-RTO: 0 /100 WBC
PLATELET # BLD AUTO: 282 K/UL (ref 164–446)
PLATELET BLD QL SMEAR: NORMAL
PMV BLD AUTO: 9.5 FL (ref 9–12.9)
POLYCHROMASIA BLD QL SMEAR: NORMAL
POTASSIUM SERPL-SCNC: 5.1 MMOL/L (ref 3.6–5.5)
PRODUCT TYPE UPROD: NORMAL
PROT SERPL-MCNC: 8.6 G/DL (ref 6–8.2)
RBC # BLD AUTO: 2.43 M/UL (ref 4.2–5.4)
RBC BLD AUTO: PRESENT
RH BLD: NORMAL
SODIUM SERPL-SCNC: 128 MMOL/L (ref 135–145)
UNIT STATUS USTAT: NORMAL
WBC # BLD AUTO: 4.8 K/UL (ref 4.8–10.8)

## 2022-05-22 PROCEDURE — 84703 CHORIONIC GONADOTROPIN ASSAY: CPT

## 2022-05-22 PROCEDURE — 96374 THER/PROPH/DIAG INJ IV PUSH: CPT

## 2022-05-22 PROCEDURE — 700111 HCHG RX REV CODE 636 W/ 250 OVERRIDE (IP): Performed by: EMERGENCY MEDICINE

## 2022-05-22 PROCEDURE — 700102 HCHG RX REV CODE 250 W/ 637 OVERRIDE(OP): Performed by: EMERGENCY MEDICINE

## 2022-05-22 PROCEDURE — 86902 BLOOD TYPE ANTIGEN DONOR EA: CPT

## 2022-05-22 PROCEDURE — 85025 COMPLETE CBC W/AUTO DIFF WBC: CPT

## 2022-05-22 PROCEDURE — P9016 RBC LEUKOCYTES REDUCED: HCPCS

## 2022-05-22 PROCEDURE — 93005 ELECTROCARDIOGRAM TRACING: CPT | Performed by: EMERGENCY MEDICINE

## 2022-05-22 PROCEDURE — 96375 TX/PRO/DX INJ NEW DRUG ADDON: CPT

## 2022-05-22 PROCEDURE — 80053 COMPREHEN METABOLIC PANEL: CPT

## 2022-05-22 PROCEDURE — 86901 BLOOD TYPING SEROLOGIC RH(D): CPT

## 2022-05-22 PROCEDURE — 86850 RBC ANTIBODY SCREEN: CPT

## 2022-05-22 PROCEDURE — 36415 COLL VENOUS BLD VENIPUNCTURE: CPT

## 2022-05-22 PROCEDURE — 36430 TRANSFUSION BLD/BLD COMPNT: CPT

## 2022-05-22 PROCEDURE — 86922 COMPATIBILITY TEST ANTIGLOB: CPT

## 2022-05-22 PROCEDURE — 86945 BLOOD PRODUCT/IRRADIATION: CPT

## 2022-05-22 PROCEDURE — 86900 BLOOD TYPING SEROLOGIC ABO: CPT

## 2022-05-22 PROCEDURE — A9270 NON-COVERED ITEM OR SERVICE: HCPCS | Performed by: EMERGENCY MEDICINE

## 2022-05-22 RX ORDER — LORAZEPAM 2 MG/ML
0.5 INJECTION INTRAMUSCULAR ONCE
Status: COMPLETED | OUTPATIENT
Start: 2022-05-22 | End: 2022-05-22

## 2022-05-22 RX ORDER — ONDANSETRON 4 MG/1
4 TABLET, ORALLY DISINTEGRATING ORAL ONCE
Status: COMPLETED | OUTPATIENT
Start: 2022-05-22 | End: 2022-05-22

## 2022-05-22 RX ORDER — LORAZEPAM 1 MG/1
1 TABLET ORAL ONCE
Status: COMPLETED | OUTPATIENT
Start: 2022-05-22 | End: 2022-05-22

## 2022-05-22 RX ORDER — DIPHENHYDRAMINE HYDROCHLORIDE 50 MG/ML
25 INJECTION INTRAMUSCULAR; INTRAVENOUS ONCE
Status: COMPLETED | OUTPATIENT
Start: 2022-05-22 | End: 2022-05-22

## 2022-05-22 RX ORDER — ONDANSETRON 2 MG/ML
4 INJECTION INTRAMUSCULAR; INTRAVENOUS ONCE
Status: COMPLETED | OUTPATIENT
Start: 2022-05-22 | End: 2022-05-22

## 2022-05-22 RX ADMIN — LORAZEPAM 0.5 MG: 2 INJECTION INTRAMUSCULAR; INTRAVENOUS at 01:30

## 2022-05-22 RX ADMIN — LORAZEPAM 1 MG: 1 TABLET ORAL at 00:40

## 2022-05-22 RX ADMIN — DIPHENHYDRAMINE HYDROCHLORIDE 25 MG: 50 INJECTION, SOLUTION INTRAMUSCULAR; INTRAVENOUS at 04:07

## 2022-05-22 RX ADMIN — ONDANSETRON 4 MG: 2 INJECTION INTRAMUSCULAR; INTRAVENOUS at 01:30

## 2022-05-22 RX ADMIN — ONDANSETRON 4 MG: 4 TABLET, ORALLY DISINTEGRATING ORAL at 00:45

## 2022-05-22 NOTE — ED PROVIDER NOTES
"ED Provider Note  ED Provider Note    Scribed for Milind Dotson by Milind Dotson. 5/22/2022  12:13 AM    Primary care provider: Ella Matthew M.D.  Means of arrival: private vehicle   History obtained from: Patient  History limited by: None    CHIEF COMPLAINT  Chief Complaint   Patient presents with   • Dizziness     Since Friday     • Anxiety     Feeling anxious due to low bld count        [facility]  Lily Nicole is a 26 y.o. female who presents to the Emergency Department   \"26 y.o. female, well-known to this service, with history of lupus on immunotherapy, and end-stage renal disease on hemodialysis (M, W, F, Dr. Trent), chronic respiratory failure on 3L-4L of oxygen at baseline, who unfortunately has been hospitalized 9 times in 2021, mostly with respiratory issues and or issues related to GI bleed requiring multiple endoscopies.\"  She states that she normally has to receive a transfusion approximately once a month.  She had blood work done on Friday and they called her today stating that her hemoglobin was 6.2.  I asked her if she was symptomatic and she said she was feeling tired.  They said she can follow-up on Wednesday for her routinely scheduled blood transfusion.  However tonight she felt increased weakness, anxiety and dizziness.  Denies chest pain or shortness of breath, no nausea or vomiting, no diarrhea or constipation.  She denies having fevers.  She is just feeling generally weak.  Her pain has been controlled on her home Dilaudid.  She is asking for Ativan, Zofran and blood transfusion.  She denies any bloody stools.  Quality: Exhaustion  Duration: 1 week  Severity: Mild to moderate  Associated sx: None    REVIEW OF SYSTEMS  As above, all other systems reviewed and are negative.   See HPI for further details.     PAST MEDICAL HISTORY   has a past medical history of Anemia (01/17/2018), AVF (arteriovenous fistula) (HCC), Chest pain, Chest tightness, Daytime sleepiness, Dialysis " patient ([facility]), Difficulty breathing, ESRD (end stage renal disease) on dialysis ([facility]) (01/17/2018), Gasping for breath, Heart burn, Hypertension (01/17/2018), Indigestion, Lupus (HCC), Migraines (01/17/2018), Painful breathing, Palpitations, Seizure ([facility]) (2013), Shortness of breath, Swelling of lower extremity, and Wheezing.  SURGICAL HISTORY   has a past surgical history that includes ronak by laparoscopy (4/5/2010); av fistula creation (Right); angioplasty (01/17/2018); other; other; gastroscopy-endo (12/9/2019); gastroscopy (N/A, 5/30/2020); gastroscopy-endo (9/18/2020); upper gi endoscopy,ctrl bleed (11/12/2020); upper gi endoscopy,biopsy (11/12/2020); gastroscopy-endo (11/12/2020); colonoscopy,diagnostic (1/8/2021); upper gi endoscopy,diagnosis (1/8/2021); upper gi endoscopy,ctrl bleed (1/8/2021); upper gi endoscopy,diagnosis (3/5/2021); upper gi endoscopy,diagnosis (N/A, 3/19/2021); upper gi endoscopy,ctrl bleed (3/19/2021); and bronchoscopy,diagnostic (Bilateral, 5/13/2021).  SOCIAL HISTORY  Social History     Tobacco Use   • Smoking status: Never Smoker   • Smokeless tobacco: Never Used   Vaping Use   • Vaping Use: Never used   Substance Use Topics   • Alcohol use: No   • Drug use: No      Social History     Substance and Sexual Activity   Drug Use No     FAMILY HISTORY  Family History   Problem Relation Age of Onset   • Diabetes Paternal Grandmother      CURRENT MEDICATIONS  Home Medications     Reviewed by Tara Tai R.N. (Registered Nurse) on 05/21/22 at 2353  Med List Status: Partial   Medication Last Dose Status   acetaminophen (TYLENOL) 500 MG Tab  Active   amLODIPine (NORVASC) 5 MG Tab  Active   atenolol (TENORMIN) 25 MG Tab  Active   cloNIDine (CATAPRES) 0.2 MG/24HR PATCH WEEKLY patch  Active   cloNIDine (CATAPRES) 0.3 MG Tab  Active   HYDROmorphone (DILAUDID) 2 MG Tab  Active   hydroxychloroquine (PLAQUENIL) 200 MG Tab  Active   mycophenolate sodium (MYFORTIC) 360 MG Tablet Delayed Response  tablet  Active   ondansetron (ZOFRAN ODT) 4 MG TABLET DISPERSIBLE  Active   ondansetron (ZOFRAN) 4 MG Tab tablet  Active   pantoprazole (PROTONIX) 40 MG Tablet Delayed Response  Active   predniSONE (DELTASONE) 5 MG Tab  Active              ALLERGIES  Allergies   Allergen Reactions   • Cephalexin Rash     Nausea and rash   Hives  .   • Clindamycin Rash     Nausea and rash     Hive  .   • Methylprednisolone      Anxious  Other reaction(s): Other-Reaction in Comments  Anxious   • Metoprolol Rash and Nausea     Nausea and rash     .   • Compazine      C/o anxiety   • Fentanyl And Related Anxiety   • Hydrocodone-Acetaminophen Rash and Nausea     Generalized rash  Generalized rash   • Maxipime [Cefepime] Itching   • Reglan [Metoclopramide] Anxiety   • Tape Rash     Paper tape is ok       PHYSICAL EXAM    VITAL SIGNS:   Vitals:    05/21/22 2351 05/22/22 0116 05/22/22 0225 05/22/22 0312   BP: (!) 154/101 (!) 158/104 (!) 163/106 133/84   Pulse: 85 88 78 80   Resp: 18 (!) 22 (!) 22 (!) 22   Temp: 37.6 °C (99.6 °F)      TempSrc: Temporal      SpO2: 96% 95% 100% 100%   Weight:       Height:           Vitals: My interpretation: Hypertensive, not tachycardic, afebrile, not hypoxic    Reinterpretation of vitals: Unchanged    Cardiac Monitor Interpretation: The cardiac monitor revealed normal Sinus Rhythm as interpreted by me. The cardiac monitor was ordered secondary to the patient's history of anemia and to monitor for dysrhythmia and/or tachycardia.    PE:   Constitutional: Well developed, Well nourished, No acute distress, Non-toxic appearance.   HENT: Normocephalic, Atraumatic, Bilateral external ears normal, Oropharynx is clear mucous membranes are moist. No oral exudates or nasal discharge.   Eyes: Pupils are equal round and reactive, EOMI, Conjunctiva normal, No discharge.   Neck: Normal range of motion, No tenderness, Supple, No stridor. No meningismus.  Lymphatic: No lymphadenopathy noted.   Cardiovascular: Regular rate  and rhythm without murmur rub or gallop.  Thorax & Lungs: Clear breath sounds bilaterally without wheezes, rhonchi or rales. There is no chest wall tenderness.   Abdomen: Soft non-tender non-distended. There is no rebound or guarding. No organomegaly is appreciated. Bowel sounds are normal.  Skin: Normal without rash.   Back: No CVA or spinal tenderness.   Extremities: Intact distal pulses, No edema, No tenderness, No cyanosis, No clubbing. Capillary refill is less than 2 seconds.  Musculoskeletal: Good range of motion in all major joints. No tenderness to palpation or major deformities noted.   Neurologic: Alert & oriented x 3, Normal motor function, Normal sensory function, No focal deficits noted. Reflexes are normal.  Psychiatric: Affect normal, Judgment normal, Mood normal. There is no suicidal ideation or patient reported hallucinations.     DIAGNOSTIC STUDIES / PROCEDURES    LABS  Results for orders placed or performed during the hospital encounter of 05/21/22   CBC WITH DIFFERENTIAL   Result Value Ref Range    WBC 4.8 4.8 - 10.8 K/uL    RBC 2.43 (L) 4.20 - 5.40 M/uL    Hemoglobin 6.4 (L) 12.0 - 16.0 g/dL    Hematocrit 22.4 (L) 37.0 - 47.0 %    MCV 92.2 81.4 - 97.8 fL    MCH 26.3 (L) 27.0 - 33.0 pg    MCHC 28.6 (L) 33.6 - 35.0 g/dL    RDW 65.3 (H) 35.9 - 50.0 fL    Platelet Count 282 164 - 446 K/uL    MPV 9.5 9.0 - 12.9 fL    Neutrophils-Polys 79.20 (H) 44.00 - 72.00 %    Lymphocytes 13.70 (L) 22.00 - 41.00 %    Monocytes 4.00 0.00 - 13.40 %    Eosinophils 1.90 0.00 - 6.90 %    Basophils 0.60 0.00 - 1.80 %    Immature Granulocytes 0.60 0.00 - 0.90 %    Nucleated RBC 0.00 /100 WBC    Neutrophils (Absolute) 3.77 2.00 - 7.15 K/uL    Lymphs (Absolute) 0.65 (L) 1.00 - 4.80 K/uL    Monos (Absolute) 0.19 0.00 - 0.85 K/uL    Eos (Absolute) 0.09 0.00 - 0.51 K/uL    Baso (Absolute) 0.03 0.00 - 0.12 K/uL    Immature Granulocytes (abs) 0.03 0.00 - 0.11 K/uL    NRBC (Absolute) 0.00 K/uL    Hypochromia 1+     Anisocytosis  1+     Macrocytosis 1+    COMP METABOLIC PANEL   Result Value Ref Range    Sodium 128 (L) 135 - 145 mmol/L    Potassium 5.1 3.6 - 5.5 mmol/L    Chloride 86 (L) 96 - 112 mmol/L    Co2 33 20 - 33 mmol/L    Anion Gap 9.0 7.0 - 16.0    Glucose 90 65 - 99 mg/dL    Bun 35 (H) 8 - 22 mg/dL    Creatinine 4.44 (HH) 0.50 - 1.40 mg/dL    Calcium 8.9 8.4 - 10.2 mg/dL    AST(SGOT) 22 12 - 45 U/L    ALT(SGPT) 8 2 - 50 U/L    Alkaline Phosphatase 94 30 - 99 U/L    Total Bilirubin 0.3 0.1 - 1.5 mg/dL    Albumin 3.1 (L) 3.2 - 4.9 g/dL    Total Protein 8.6 (H) 6.0 - 8.2 g/dL    Globulin 5.5 (H) 1.9 - 3.5 g/dL    A-G Ratio 0.6 g/dL   COD (ADULT)   Result Value Ref Range    ABO Grouping Only O     Rh Grouping Only POS     Antibody Screen-Cod NEG     Component R       R7I                 RedBloodCellsIRR    [id number]   selected     22   02:31      Product Type Red Blood Cells IRR LR Pheresis     Dispense Status selected     Unit Number (Barcoded) [id number]     Product Code (Barcoded) C4675U38     Blood Type (Barcoded) 5100    HCG QUAL SERUM   Result Value Ref Range    Beta-Hcg Qualitative Serum Negative Negative   ESTIMATED GFR   Result Value Ref Range    GFR (CKD-EPI) 13 (A) >60 mL/min/1.73 m 2   PLATELET ESTIMATE   Result Value Ref Range    Plt Estimation Normal    MORPHOLOGY   Result Value Ref Range    RBC Morphology Present     Polychromia 1+    DIFFERENTIAL COMMENT   Result Value Ref Range    Comments-Diff see below    EKG (NOW)   Result Value Ref Range    Report       [facility] Emergency Dept.    Test Date:  2022  Pt Name:    CASE WOODSON            Department: [facility]  MRN:        [id number]                      [facility]  Gender:     Female                       Technician: EO  :        [id number]                   Requested By:TEVIN DOTSON  Order #:    [id number]                    Reading MD: Tevin Dotson    Measurements  Intervals                                 Axis  Rate:       80                           P:          38  NC:         172                          QRS:        -17  QRSD:       78                           T:          122  QT:         376  QTc:        434    Interpretive Statements  SINUS RHYTHM  PROBABLE LVH WITH SECONDARY REPOL ABNRM  BASELINE WANDER IN LEAD(S) II,III,aVR,aVF  Compared to ECG 04/06/2022 22:18:35  Sinus tachycardia no longer present  Electronically Signed On 5- 2:51:34 PDT by Milind Dotson        All labs reviewed by me. Significant for no leukocytosis, severe anemia, mild hyponatremia, normal electrolytes otherwise, CKD with elevated creatinine, mildly elevated BUN, normal liver enzymes, normal bilirubin, pregnancy negative    RADIOLOGY  No orders to display     The radiologist's interpretation of all radiological studies have been reviewed by me.    COURSE & MEDICAL DECISION MAKING  Nursing notes, VS, PMSFHx, labs, imaging, EKG reviewed in chart.    MDM: 12:13 AM Lily Nicole is a 26 y.o. female who presented with symptomatic anemia.  History of chronic anemia.  Multiple comorbidities, well-known to the facility, multiple inpatient admissions.  Denies black or bloody stools.  Gets routine transfusions approximately once a month.  Hemoglobin outpatient was 6.2 and she developed anxiety, dizziness and weakness and came to ED requesting transfusion.  She is stable with her vital signs.  In no acute distress upon arrival here.  EKG unremarkable and done to evaluate for dizziness.  Labs do demonstrate severe anemia and she was ordered a unit of irradiated red blood cells.  She does have antibodies present according to blood bank requires irradiated blood.  Labs demonstrate mild hyponatremia but otherwise are seemingly at baseline.  She is end-stage renal dialysis patient, her potassium is elevated but within normal limits her EKG shows no widening QRS complexes.  She is scheduled for dialysis tomorrow.  She required  physician peripheral venous access, see procedure note, as nursing could not establish IV access.  She was placed on ED observation while awaiting transfusion and improvement of symptoms after transfusion.  Patient tolerated blood transfusion well, no acute distress, normal vital signs at time of discharge.  Verbalized understanding strict return precautions.  Feels improved.    12:20 AM: Patient placed on ED observation to evaluate for improvement of symptoms after red blood cell transfusion.  No contributory family history.    5:44 AM patient discharged from ED observation, tolerated her blood transfusion well, no acute distress, normal vital signs and feels improved.    CRITICAL CARE TIME 38 minutes: Acute anemia that symptomatic requiring blood transfusion of irradiated red blood cells  There was a very real possibility of deterioration of the patient's condition.  This patient required the highest level of care.  I provided critical care services which included: review of the medical record, treatment orders, ordering and reviewing test results, frequent reevaluation of the patient's condition and response to treatment, as well as discussing the case with appropriate personnel and various consultants. The critical care time associated with the care of this patient is exclusive of any procedures or specific interventions.    Peripheral Venous Access with US Guidance   Limited Diagnostic Exam: Venipuncture requiring physician skill with ultrasound guidance  Performed by myself  Patient Identity confirmed: Yes  Risks, benefits and alternatives were discussed  Consent given by: Patient  Indication for Exam: Vascular Access   Vein/Location: Left brachial vein  Normal Compressibility and Visualized Patency   Catheter Size: 20-gauge  Number of Attempts: 1  Successful Catheter Insertion: Yes  Complications: None    FINAL IMPRESSION  1. Anemia, unspecified type Acute   2. Anxiety Acute   3. Encounter for blood  transfusion Acute      The note accurately reflects work and decisions made by me.  Milind Dotson  5/22/2022  12:13 AM

## 2022-05-22 NOTE — DISCHARGE INSTRUCTIONS
You will need to follow-up for reevaluation of your hemoglobin levels and likely a possible another blood transfusion on Wednesday.  If you have worsening symptoms, please return to the ED for further evaluation and treatment.  Thank you for coming in today.

## 2022-05-22 NOTE — ED TRIAGE NOTES
Pt comes in w/ father  C/o increased anxiety and dizziness  Chronic anemia  Recent bld work this past Friday showed hemoglobin 6.2  Was told by doctor if she started having issues with having low bld count to come in for transfusion  Father w/ pt   For the past couple of days she has been having dizziness which makes her anxiety worsen

## 2022-05-22 NOTE — ED NOTES
Patient requesting her Nephrologist be consulted.  States she usually has to have dialysis after her transfusions.

## 2022-05-22 NOTE — ED NOTES
Blood bank called, patient needs irradiated blood.  Blood to be ordered from [facility].

## 2022-05-25 ENCOUNTER — APPOINTMENT (OUTPATIENT)
Dept: ONCOLOGY | Facility: MEDICAL CENTER | Age: 27
End: 2022-05-25
Attending: INTERNAL MEDICINE
Payer: COMMERCIAL

## 2022-05-26 ENCOUNTER — OFFICE VISIT (OUTPATIENT)
Dept: BEHAVIORAL HEALTH | Facility: CLINIC | Age: 27
End: 2022-05-26
Payer: COMMERCIAL

## 2022-05-26 DIAGNOSIS — F34.1 PERSISTENT DEPRESSIVE DISORDER WITH ANXIOUS DISTRESS, CURRENTLY MILD: ICD-10-CM

## 2022-05-26 PROCEDURE — 90791 PSYCH DIAGNOSTIC EVALUATION: CPT | Performed by: PSYCHOLOGIST

## 2022-05-26 ASSESSMENT — ANXIETY QUESTIONNAIRES
GAD7 TOTAL SCORE: 20
6. BECOMING EASILY ANNOYED OR IRRITABLE: NEARLY EVERY DAY
1. FEELING NERVOUS, ANXIOUS, OR ON EDGE: NEARLY EVERY DAY
IF YOU CHECKED OFF ANY PROBLEMS ON THIS QUESTIONNAIRE, HOW DIFFICULT HAVE THESE PROBLEMS MADE IT FOR YOU TO DO YOUR WORK, TAKE CARE OF THINGS AT HOME, OR GET ALONG WITH OTHER PEOPLE: VERY DIFFICULT
5. BEING SO RESTLESS THAT IT IS HARD TO SIT STILL: MORE THAN HALF THE DAYS
4. TROUBLE RELAXING: NEARLY EVERY DAY
2. NOT BEING ABLE TO STOP OR CONTROL WORRYING: NEARLY EVERY DAY
3. WORRYING TOO MUCH ABOUT DIFFERENT THINGS: NEARLY EVERY DAY
7. FEELING AFRAID AS IF SOMETHING AWFUL MIGHT HAPPEN: NEARLY EVERY DAY

## 2022-05-26 ASSESSMENT — PATIENT HEALTH QUESTIONNAIRE - PHQ9
SUM OF ALL RESPONSES TO PHQ QUESTIONS 1-9: 17
CLINICAL INTERPRETATION OF PHQ2 SCORE: 4
5. POOR APPETITE OR OVEREATING: 2 - MORE THAN HALF THE DAYS

## 2022-05-26 NOTE — PROGRESS NOTES
..[facility]  PSYCHOTHERAPY INITIAL ASSESSMENT    This evaluation was conducted face to face at the [facility] office. Therapist reviewed limits of confidentiality and informed consent. Therapist discussed risks/benefits and expectations for services. Patient provided verbal consent for psychotherapy services.       Name: Lily Nicole  MRN: [id number]  : 1995  Age: 26 y.o.  Date of assessment: 2022  PCP: Ella Matthew M.D.  Persons in attendance: Patient  Total session time: 45minutes        CHIEF COMPLAINT AND HISTORY OF PRESENTING PROBLEM:    Lily Nicole is a 26 y.o., female referred for assessment by her pain management Dr. He referred her because she has a lot going on.  She has Lupus, diagnosed for 12 years ago, on dialysis because the Lupus affected her kidneys. Has to go to dialysis 3x per week for 3.5 hours. When the Lupus flares it puts her into the hospital a minimum of 1 month.  Last time it wasn't flared was 2-3 years ago. Has joint pain, back pain. Also struggles emotionally. Has depression and anxiety. Assessed for depression first and she endorsed tearfulness/sadness, hopelessness/worthlessness, lack of motivation/energy/fatigue, a little difficulty concentrating and focusing, memory is good, appetite is fair, sleep is poor because she has insomnia and pain. Has had one significant bout of depression in the past.  She's always felt sad since diagnosed, but it has gotten worse the past 2 years. No thoughts of suicide. Average daily depression is 6-7. Anxiety assessed and she reported getting nervous, shaky, sweaty, racing thoughts, worry thoughts, heart palpitations, difficulty breathing, panic attacks which are like difficulty breathing, freezing in place, tunnel vision.  Lasts a couple of seconds and happens 1x per month. Has had anxiety for 2 years. The Lupus flare 2 years ago is what triggered the increased depression and anxiety. She feels  like she is a burden to her whole family. Her family has to do things for her, help with chores, etc.  She can't work, last work 4 years ago. She is 24/7 on oxygen.        BEHAVIORAL HEALTH TREATMENT HISTORY     [] Patient denies any prior mental health treatment or history    Does patient report a history of prior behavioral health treatment? no  When and what for?   Former Diagnoses:       CURRENT RELEVANT MEDICATIONS  Dilaudid 2mg.       FAMILY/SOCIAL HISTORY    Marital Status:  Single  # Of Children: None  Current living situation/household members:  Lives with her mother and sister   Pets:  1 dog  Family of origin history / siblings:  Just one sister   Quality/quantity of current support: Mother. Has friends, other family.   Quality/quantity of other support:       Family History of mental health issues? No  Who and what type:    ACUTE OR CHRONIC STRESSORS:  Having to go to dialysis, dad takes her.     NUTRITION ISSUES    Does patient report any current nutritional concerns? Yes. Protein is always low  Does patient report change in appetite or weight loss/gain? N/A  Does patient report history of eating disorder symptoms? N/A      PAIN ISSUES  Does patient report physical pain? Yes  Indicate if pain is acute or chronic, and location: Daily average pain is an 8  Pain scale rating:       Does patient/parent report functional impairment from medical, developmental, or pain issues?   no    Primary Care Behavioral Health Screenings Given? Yes    RESULTS OF SCREENING MEASURES:  ..  Depression Screen (PHQ-2/PHQ-9) 1/1/2022 4/7/2022 5/26/2022   PHQ-2 Total Score 0 0 -   PHQ-2 Total Score - - 4   PHQ-9 Total Score - - 17       Interpretation of PHQ-9 Total Score   Score Severity   1-4 No Depression   5-9 Mild Depression   10-14 Moderate Depression   15-19 Moderately Severe Depression   20-27 Severe Depression    ..  MAX 7 5/26/2022   MAX-7 Total Score 20       Interpretation of AMX 7 Total Score   Score Severity:  0-4  No Anxiety   5-9 Mild Anxiety  10-14 Moderate Anxiety  15-21 Severe Anxiety        EDUCATIONAL/LEARNING HISTORY    Does patient report any history of current developmental concerns or Special Education ? No  Did the patient graduate high school? Yes. Was home schooled due to the Lupus so young. Got her GED  If not last grade attended:  Did the patient attend college? No   Did the patient Graduate College? N/A Degree Received:    Is patient currently attending a school or program?       EMPLOYMENT/RESOURCES    Is the patient currently employed? No  History of employment: 4 years ago worked at [employer]  Does the patient/parent report adequate financial resources? Has natural supports, mom and sister  Does patient identify impact of presenting issue on work functioning? Yes  Work or income-related stressors:    Disabled?:      HISTORY:    [x] Patient denies having any  history      SUBSTANCE USE/ADDICTION HISTORY    ALCOHOL    [x] Patient denies the use of any alcohol    SUBSTANCES    [x] Patient denies the use of any illicit drugs    Marijuana or marijuana products? No  Last time patient used THC products:     Any other addictive behavior reported (gambling, shopping, sex)? No     Has the patient had any of the following consequences as a result of alcohol, drug or other substance? None    Is there a family history of substance use/addiction? No  If so who?    SMOKING    [x] Patient denies smoking cigarettes, vapes or pipes         SPIRITUAL/CULTURAL/IDENTITY:    What are the patient’s/family’s spiritual beliefs or practices? Denied  What is the patient’s cultural or ethnic background/identity?    How does the patient identify their sexual orientation?  Straight  How does the patient identify their gender? Female  Does the patient identify any spiritual/cultural/identity factors as relevant to the presenting issue? No    LEGAL HISTORY    [x] Patient denies any legal history.      ABUSE/NEGLECT/TRAUMA SCREENING    [] Patient denies any prior abuse, neglect or trauma    Does patient report feeling “unsafe” in his/her home, or afraid of anyone? No  Does patient report any history of physical, sexual, or emotional abuse? No  Does the patient report any history of CPS/APS/police involvement related to suspected abuse/neglect or domestic violence? No  Does the patient report any other history of potentially traumatic life events? Yes. Coming to [place] and not speaking English  Based on the information provided during the current assessment, is a mandated report of suspected abuse/neglect being made?  No     SAFETY ASSESSMENT - SELF    [x] Patient denies ever having SI, past or present    Does patient acknowledge current or past symptoms of dangerousness to self? No    Recent change in frequency/specificity/intensity of suicidal thoughts or self-harm behavior? No  Current access to firearms, medications, or other identified means of suicide/self-harm? No  If yes, willing to restrict access to means of suicide/self-harm? No desire to harm self.  Just sometimes doesn't want to be here  Protective factors present: Future-oriented, Optimism, Positive coping skills and Positive self-efficacy    Current Suicide Risk: Low  Crisis Safety Plan completed and copy given to patient: No    SAFETY ASSESSMENT - OTHERs    [x] Patient denies any hx of HI, past or present.     Current Homicide Risk:  Low  Crisis Safety Plan completed and copy given to patient? No  Based on information provided during the current assessment, is a mandated “duty to warn” being exercised? No      HOBBIES/INTERESTS:   Likes to paint, doing crafts, hanging out with family, mom is a support/distraction. Sometimes when family members come around she doesn't need oxygen.        PATIENT EXPECTATION / GOALS FOR THERAPY  Wants to feel more self-confident. Dialysis takes a huge part of her daily activities.  When the weekend comes if she  doesn't feel good she lays around, life feels like a routine, body is just physically tired. Life is just revolving around her illness.       MENTAL STATUS/OBSERVATIONS:     Participation: Active verbal participation  Grooming: Casual  Orientation:Alert and Fully Oriented   Behavior: Calm  Eye contact: Good   Mood:Euthymic  Affect:Congruent with content  Thought process: Logical and Goal-directed  Thought content:  Within normal limits  Speech: Rate within normal limits and Volume within normal limits  Perception: Within normal limits  Memory: No gross evidence of memory deficits  Insight: Good  Judgment:  Good  Other:       CLINICAL FORMULATION:   Lily Nicole is a 26 y.o. year old female presenting to [facility] for symptoms related to depression related to dealing with chronic medical issues.  Her pain management Dr referred her because she has Lupus, diagnosed 12 years ago, and is on dialysis because the Lupus affected her kidneys. Has to go to dialysis 3x per week for 3.5 hours. When the Lupus flares it puts her into the hospital a minimum of 1 month. She is single and lives at home with her mother.  No children. She graduated high school but did not attend college, and she is unable to work due to her physical limitations.  She denied the use of illegal drugs, alcohol, tobacco and marijuana products.  Pt is Oriented x4, and mental status is WNL. There were no perceptual disturbance and pt. Denied AH/VH or delusions. Pt. Denied SI or HI, with no prior suicide attempts.  Patient has some positive coping strategies and a good support system.       DIAGNOSTIC IMPRESSION(S):  1. Persistent depressive disorder with anxious distress, currently mild         Does patient express agreement with the above plan? Yes     Referral or follow up appointment(s) scheduled? Yes Patient given instructions on how to schedule further appointments. Please call [phone] to reschedule.        Edna Dahl  Marta  Licensed Psychologist

## 2022-05-29 ENCOUNTER — APPOINTMENT (OUTPATIENT)
Dept: ONCOLOGY | Facility: MEDICAL CENTER | Age: 27
End: 2022-05-29
Attending: INTERNAL MEDICINE
Payer: COMMERCIAL

## 2022-05-31 NOTE — DOCUMENTATION QUERY
[facility]                                                                       Query Response Note      PATIENT:               CASE REYNOSO  ACCT #:                  [id number]  MRN:                     [id number]  :                      1995  ADMIT DATE:       2022 11:59 PM  DISCH DATE:        2022 6:21 AM  RESPONDING  PROVIDER #:        [id number]           QUERY TEXT:      The patient carries a diagnosis of lupus. With the implementation of ICD  10 CM, there is no longer a default code for lupus to systemic lupus erythematosus. Coding Clinic advises the  to query the physician for lupus specificity.  Based on clinical findings, risk factors and treatment, can lupus be further clarified as:    NOTE:  If an appropriate response is not listed below, please respond with a new note.      The patient's Clinical Indicators include:  Please further specify the type of lupus the patient has.    On the ED provider note:  HPI: 26 y.o. female, well-known to this service, with history of lupus on immunotherapy.    Past medical history: Lupus (HCC)    Thank you,    [email or web address]  Options provided:   -- Lupus anticoagulant (LAC)   -- Lupus discoid (erythematosus) (local)   -- Lupus exedens   -- Lupus hydralazine   -- Lupus nontuberculous, not disseminated   -- Lupus pernio ([place])   -- Lupus systemic (SLE)   -- Lupus Tuberculous   -- Lupus vulgaris   -- Unable to determine      Query created by: Jeny Guzman on 2022 12:35 PM    RESPONSE TEXT:    Lupus systemic (SLE)          Electronically signed by:  TEVIN MCMAHON MD 2022 5:03 PM

## 2022-06-01 ENCOUNTER — APPOINTMENT (OUTPATIENT)
Dept: ONCOLOGY | Facility: MEDICAL CENTER | Age: 27
End: 2022-06-01
Attending: INTERNAL MEDICINE
Payer: COMMERCIAL

## 2022-06-02 ENCOUNTER — OFFICE VISIT (OUTPATIENT)
Dept: SLEEP MEDICINE | Facility: MEDICAL CENTER | Age: 27
End: 2022-06-02
Payer: COMMERCIAL

## 2022-06-02 VITALS
DIASTOLIC BLOOD PRESSURE: 76 MMHG | SYSTOLIC BLOOD PRESSURE: 116 MMHG | BODY MASS INDEX: 20.11 KG/M2 | OXYGEN SATURATION: 98 % | WEIGHT: 109.31 LBS | HEART RATE: 89 BPM | HEIGHT: 62 IN

## 2022-06-02 DIAGNOSIS — J96.21 ACUTE ON CHRONIC RESPIRATORY FAILURE WITH HYPOXIA (HCC): ICD-10-CM

## 2022-06-02 PROCEDURE — 99213 OFFICE O/P EST LOW 20 MIN: CPT | Performed by: INTERNAL MEDICINE

## 2022-06-02 ASSESSMENT — ENCOUNTER SYMPTOMS
NEUROLOGICAL NEGATIVE: 1
GASTROINTESTINAL NEGATIVE: 1
PSYCHIATRIC NEGATIVE: 1
MUSCULOSKELETAL NEGATIVE: 1
CARDIOVASCULAR NEGATIVE: 1
EYES NEGATIVE: 1

## 2022-06-02 ASSESSMENT — FIBROSIS 4 INDEX: FIB4 SCORE: 0.66

## 2022-06-02 NOTE — PROGRESS NOTES
Pulmonary Clinic follow up    Date of Service: 6/2/2022    Reason for follow up:  Follow-Up (Chronic respiratory failure with Hypoxia. Last seen 05/04/21) and Other (CTA 04/07/22, Echo 04/07/22)         Problem List Items Addressed This Visit     Acute on chronic respiratory failure (HCC)     Previously due to fluid overload and improved with HD  Now assessing re: pulmonarry HTN or not secondary to her lupus  ECHO with RVSP of 35 mmhg  She will need a right heart cath to assess  Pt would like to wait till August to assess with right heart cath and pulmonary pressures  Follow up in August  Encouraged daily activity             Also need DR. Wong's note from Rheuumatology  History of Present Illness: Lily Nicole is a 26 y.o. female with a past medical history of past medical history of lupus with lupus nephritis.  Referred by Dr. Wong rheumatology progressive dyspnea and 2 L nasal cannula.  She had a CT scan in April 2021 see below does show lower lobe dominant groundglass with patchy areas of groundglass bilaterally.  She is constantly anemic and is transfusion dependent.  Sent for lupus pneumonitis.    She is on the [facility] list [facility].  She gets dialysis hemodialysis Monday Wednesday Friday.  Her proBNP when reviewed throughout the chart is constantly elevated greater than 35 which it was during that CT scan showed below that showed the diffuse groundglass.  He has been referred for bronchoscopy in 5/21 but at that time thought the GG was due to heart failure. Improved with additional fluid removal with HD.    I last saw patient on 5/2021 when assessed thought GG was due to fluid overload and improved  Rituxan has stopped as concern is that it is a cause of infections    Lupus medications  Prednisone 5 mg, plaquenil 40 mg myfortic 360 mg  No further rituxan    Seen cardiology and CTA cardiac  Starting to walk more - one hour - no incline  No coughing or hemoptysis  3l NC 24 /7    Last CTA 4/22  personally viewed no PE, GG at bases  Last ECHO 4/2022  Normal left ventricular size and systolic function.  The left ventricular ejection fraction is visually estimated to be 60%.  Mild mitral regurgitation.  Estimated right ventricular systolic pressure is 35 mmHg.       Review of Systems   Constitutional: Positive for malaise/fatigue.   HENT: Negative.    Eyes: Negative.    Respiratory: Positive for shortness of breath.    Cardiovascular: Negative.    Gastrointestinal: Negative.    Genitourinary: Negative.    Musculoskeletal: Negative.    Skin: Negative.    Neurological: Negative.    Endo/Heme/Allergies: Negative.    Psychiatric/Behavioral: Negative.        Current Outpatient Medications on File Prior to Visit   Medication Sig Dispense Refill   • cloNIDine (CATAPRES) 0.3 MG Tab Take 0.3 mg by mouth 2 times a day.     • HYDROmorphone (DILAUDID) 2 MG Tab Take 2 mg by mouth 1 time a day as needed for Severe Pain.     • acetaminophen (TYLENOL) 500 MG Tab Take 500-1,000 mg by mouth every 6 hours as needed. Indications: Pain     • amLODIPine (NORVASC) 5 MG Tab Take 5 mg by mouth every day.     • mycophenolate sodium (MYFORTIC) 360 MG Tablet Delayed Response tablet Take 360 mg by mouth every evening.     • pantoprazole (PROTONIX) 40 MG Tablet Delayed Response Take 1 tablet by mouth 2 times a day. 30 tablet 3   • cloNIDine (CATAPRES) 0.2 MG/24HR PATCH WEEKLY patch Place 1 Patch on the skin every 7 days.     • predniSONE (DELTASONE) 5 MG Tab Take 5 mg by mouth every evening.     • hydroxychloroquine (PLAQUENIL) 200 MG Tab Take 200 mg by mouth every day.     • ondansetron (ZOFRAN ODT) 4 MG TABLET DISPERSIBLE Take 4 mg by mouth every 6 hours as needed for Nausea.     • atenolol (TENORMIN) 25 MG Tab Take 25 mg by mouth every day.       No current facility-administered medications on file prior to visit.       Social History     Tobacco Use   • Smoking status: Never Smoker   • Smokeless tobacco: Never Used   Vaping Use   •  "Vaping Use: Never used   Substance Use Topics   • Alcohol use: No   • Drug use: No        Past Medical History:   Diagnosis Date   • Anemia 01/17/2018   • AVF (arteriovenous fistula) ([facility])     Right Arm   • Chest pain    • Chest tightness    • Daytime sleepiness    • Dialysis patient ([facility])      dialysis, M,W,F [facility]   • Difficulty breathing    • ESRD (end stage renal disease) on dialysis ([facility]) 01/17/2018    [facility]   • Gasping for breath    • Heart burn    • Hypertension 01/17/2018    \"Controlled with medication\"   • Indigestion    • Lupus ([facility])    • Migraines 01/17/2018   • Painful breathing    • Palpitations    • Seizure ([facility]) 2013    from high blood pressure, reports 1 time event   • Shortness of breath    • Swelling of lower extremity    • Wheezing        Past Surgical History:   Procedure Laterality Date   • LA BRONCHOSCOPY,DIAGNOSTIC Bilateral 5/13/2021    Procedure: BRONCHOSCOPY, BRONCHOALVEOLAR LAVAGE;  Surgeon: William Spangler M.D.;  Location: [facility];  Service: Pulmonary   • LA UPPER GI ENDOSCOPY,DIAGNOSIS N/A 3/19/2021    Procedure: GASTROSCOPY;  Surgeon: Waylon Mcqueen M.D.;  Location: [facility];  Service: Gastroenterology   • LA UPPER GI ENDOSCOPY,CTRL BLEED  3/19/2021    Procedure: GASTROSCOPY, WITH ARGON PLASMA COAGULATION;  Surgeon: Waylon Mcqueen M.D.;  Location: [facility];  Service: Gastroenterology   • LA UPPER GI ENDOSCOPY,DIAGNOSIS  3/5/2021    Procedure: GASTROSCOPY - W/HEMOSTASIS;  Surgeon: Ej Silva M.D.;  Location: [facility];  Service: Gastroenterology   • LA COLONOSCOPY,DIAGNOSTIC  1/8/2021    Procedure: COLONOSCOPY;  Surgeon: Herbert Contreras M.D.;  Location: [facility];  Service: Gastroenterology   • LA UPPER GI ENDOSCOPY,DIAGNOSIS  1/8/2021    Procedure: GASTROSCOPY;  Surgeon: Herbert Contreras M.D.;  Location: [facility];  Service: Gastroenterology   • LA UPPER GI ENDOSCOPY,CTRL BLEED  1/8/2021    " "Procedure: GASTROSCOPY, WITH ARGON PLASMA COAGULATION;  Surgeon: Herbert Contreras M.D.;  Location: [facility];  Service: Gastroenterology   • CO UPPER GI ENDOSCOPY,CTRL BLEED  11/12/2020    Procedure: GASTROSCOPY, WITH ARGON PLASMA COAGULATION;  Surgeon: Gadiel Whitney M.D.;  Location: SUR[facility]ERY [facility];  Service: Gastroenterology   • CO UPPER GI ENDOSCOPY,BIOPSY  11/12/2020    Procedure: GASTROSCOPY, WITH BIOPSY;  Surgeon: Gadiel Whitney M.D.;  Location: SUR[facility]ERY [facility];  Service: Gastroenterology   • GASTROSCOPY-ENDO  11/12/2020    Procedure: GASTROSCOPY;  Surgeon: Gadiel Whitney M.D.;  Location: [facility];  Service: Gastroenterology   • GASTROSCOPY-ENDO  9/18/2020    Procedure: GASTROSCOPY;  Surgeon: Gadiel Whitney M.D.;  Location: [facility];  Service: Gastroenterology   • GASTROSCOPY N/A 5/30/2020    Procedure: GASTROSCOPY;  Surgeon: Waylon Mcqueen M.D.;  Location: [facility];  Service: Gastroenterology   • GASTROSCOPY-ENDO  12/9/2019    Procedure: GASTROSCOPY;  Surgeon: Aaron Kam M.D.;  Location: [facility];  Service: Gastroenterology   • ANGIOPLASTY  01/17/2018    \"Right Arm AV-Fistulagram & Angioplastyx3\"   • ULI BY LAPAROSCOPY  4/5/2010    Performed by SYED MARTELL at [facility]   • AV FISTULA CREATION Right    • OTHER      renal biopsy x 3   • OTHER      bone marrow biopsy       Allergies: Cephalexin, Clindamycin, Methylprednisolone, Metoprolol, Compazine, Fentanyl and related, Hydrocodone-acetaminophen, Maxipime [cefepime], Reglan [metoclopramide], and Tape    Family History   Problem Relation Age of Onset   • Diabetes Paternal Grandmother        Vitals:    06/02/22 1356 06/02/22 1357   Height: 1.575 m (5' 2\")    Weight: 49.6 kg (109 lb 5 oz)    Weight % change since last entry.: 0 %    BP: 116/76    Pulse: 89    BMI (Calculated): 19.99    O2 sat % on O2:  98 %   O2 Flow Rate (L/min):  3       Physical " Examination  Physical Exam  Constitutional:       Appearance: She is ill-appearing.      Comments: Hyperpigmented skin   HENT:      Head: Normocephalic and atraumatic.      Mouth/Throat:      Mouth: Mucous membranes are moist.   Eyes:      Extraocular Movements: Extraocular movements intact.      Pupils: Pupils are equal, round, and reactive to light.   Cardiovascular:      Rate and Rhythm: Normal rate.   Pulmonary:      Effort: Pulmonary effort is normal.      Breath sounds: Normal breath sounds.   Musculoskeletal:         General: Normal range of motion.   Skin:     General: Skin is warm and dry.   Neurological:      General: No focal deficit present.   Psychiatric:         Mood and Affect: Mood normal.         Behavior: Behavior normal.         Thought Content: Thought content normal.         Judgment: Judgment normal.            Ivette Eckert M.D., MD MPH [facility]  [facility]

## 2022-06-05 ENCOUNTER — OUTPATIENT INFUSION SERVICES (OUTPATIENT)
Dept: ONCOLOGY | Facility: MEDICAL CENTER | Age: 27
End: 2022-06-05
Attending: INTERNAL MEDICINE
Payer: COMMERCIAL

## 2022-06-05 ENCOUNTER — APPOINTMENT (OUTPATIENT)
Dept: RADIOLOGY | Facility: MEDICAL CENTER | Age: 27
End: 2022-06-05
Attending: EMERGENCY MEDICINE
Payer: COMMERCIAL

## 2022-06-05 ENCOUNTER — HOSPITAL ENCOUNTER (EMERGENCY)
Facility: MEDICAL CENTER | Age: 27
End: 2022-06-05
Attending: EMERGENCY MEDICINE
Payer: COMMERCIAL

## 2022-06-05 VITALS
OXYGEN SATURATION: 100 % | HEART RATE: 90 BPM | TEMPERATURE: 99.1 F | DIASTOLIC BLOOD PRESSURE: 109 MMHG | RESPIRATION RATE: 20 BRPM | WEIGHT: 110 LBS | HEIGHT: 62 IN | SYSTOLIC BLOOD PRESSURE: 174 MMHG | BODY MASS INDEX: 20.24 KG/M2

## 2022-06-05 DIAGNOSIS — M25.462 EFFUSION OF LEFT KNEE: ICD-10-CM

## 2022-06-05 DIAGNOSIS — S83.91XA SPRAIN OF RIGHT KNEE, UNSPECIFIED LIGAMENT, INITIAL ENCOUNTER: ICD-10-CM

## 2022-06-05 DIAGNOSIS — D64.9 SYMPTOMATIC ANEMIA: ICD-10-CM

## 2022-06-05 DIAGNOSIS — S80.02XA CONTUSION OF LEFT KNEE, INITIAL ENCOUNTER: ICD-10-CM

## 2022-06-05 LAB
ANISOCYTOSIS BLD QL SMEAR: ABNORMAL
BASOPHILS # BLD AUTO: 0.8 % (ref 0–1.8)
BASOPHILS # BLD: 0.04 K/UL (ref 0–0.12)
COMMENT 1642: NORMAL
EOSINOPHIL # BLD AUTO: 0.06 K/UL (ref 0–0.51)
EOSINOPHIL NFR BLD: 1.3 % (ref 0–6.9)
ERYTHROCYTE [DISTWIDTH] IN BLOOD BY AUTOMATED COUNT: 67.8 FL (ref 35.9–50)
HCT VFR BLD AUTO: 28 % (ref 37–47)
HGB BLD-MCNC: 8 G/DL (ref 12–16)
IMM GRANULOCYTES # BLD AUTO: 0.03 K/UL (ref 0–0.11)
IMM GRANULOCYTES NFR BLD AUTO: 0.6 % (ref 0–0.9)
LYMPHOCYTES # BLD AUTO: 0.73 K/UL (ref 1–4.8)
LYMPHOCYTES NFR BLD: 15.4 % (ref 22–41)
MACROCYTES BLD QL SMEAR: ABNORMAL
MCH RBC QN AUTO: 26.1 PG (ref 27–33)
MCHC RBC AUTO-ENTMCNC: 28.6 G/DL (ref 33.6–35)
MCV RBC AUTO: 91.5 FL (ref 81.4–97.8)
MONOCYTES # BLD AUTO: 0.2 K/UL (ref 0–0.85)
MONOCYTES NFR BLD AUTO: 4.2 % (ref 0–13.4)
MORPHOLOGY BLD-IMP: NORMAL
NEUTROPHILS # BLD AUTO: 3.68 K/UL (ref 2–7.15)
NEUTROPHILS NFR BLD: 77.7 % (ref 44–72)
NRBC # BLD AUTO: 0 K/UL
NRBC BLD-RTO: 0 /100 WBC
OVALOCYTES BLD QL SMEAR: NORMAL
PLATELET # BLD AUTO: 347 K/UL (ref 164–446)
PLATELET BLD QL SMEAR: NORMAL
PMV BLD AUTO: 9.6 FL (ref 9–12.9)
POIKILOCYTOSIS BLD QL SMEAR: NORMAL
RBC # BLD AUTO: 3.06 M/UL (ref 4.2–5.4)
RBC BLD AUTO: PRESENT
WBC # BLD AUTO: 4.7 K/UL (ref 4.8–10.8)

## 2022-06-05 PROCEDURE — 700111 HCHG RX REV CODE 636 W/ 250 OVERRIDE (IP): Performed by: EMERGENCY MEDICINE

## 2022-06-05 PROCEDURE — 73562 X-RAY EXAM OF KNEE 3: CPT | Mod: RT

## 2022-06-05 PROCEDURE — 99284 EMERGENCY DEPT VISIT MOD MDM: CPT

## 2022-06-05 PROCEDURE — 85025 COMPLETE CBC W/AUTO DIFF WBC: CPT

## 2022-06-05 PROCEDURE — 36415 COLL VENOUS BLD VENIPUNCTURE: CPT

## 2022-06-05 PROCEDURE — 73562 X-RAY EXAM OF KNEE 3: CPT | Mod: LT

## 2022-06-05 PROCEDURE — 96372 THER/PROPH/DIAG INJ SC/IM: CPT

## 2022-06-05 RX ORDER — ACETAMINOPHEN 325 MG/1
650 TABLET ORAL ONCE
Status: CANCELLED | OUTPATIENT
Start: 2022-06-05

## 2022-06-05 RX ORDER — 0.9 % SODIUM CHLORIDE 0.9 %
3 VIAL (ML) INJECTION PRN
Status: CANCELLED | OUTPATIENT
Start: 2022-06-05

## 2022-06-05 RX ORDER — DIPHENHYDRAMINE HYDROCHLORIDE 50 MG/ML
25 INJECTION INTRAMUSCULAR; INTRAVENOUS PRN
Status: CANCELLED
Start: 2022-06-05

## 2022-06-05 RX ORDER — 0.9 % SODIUM CHLORIDE 0.9 %
10 VIAL (ML) INJECTION PRN
Status: CANCELLED | OUTPATIENT
Start: 2022-06-05

## 2022-06-05 RX ORDER — HYDROMORPHONE HYDROCHLORIDE 1 MG/ML
0.5 INJECTION, SOLUTION INTRAMUSCULAR; INTRAVENOUS; SUBCUTANEOUS ONCE
Status: COMPLETED | OUTPATIENT
Start: 2022-06-05 | End: 2022-06-05

## 2022-06-05 RX ORDER — ONDANSETRON 4 MG/1
4 TABLET, ORALLY DISINTEGRATING ORAL ONCE
Status: COMPLETED | OUTPATIENT
Start: 2022-06-05 | End: 2022-06-05

## 2022-06-05 RX ORDER — 0.9 % SODIUM CHLORIDE 0.9 %
VIAL (ML) INJECTION PRN
Status: CANCELLED | OUTPATIENT
Start: 2022-06-05

## 2022-06-05 RX ORDER — ACETAMINOPHEN 325 MG/1
650 TABLET ORAL PRN
Status: CANCELLED | OUTPATIENT
Start: 2022-06-05

## 2022-06-05 RX ORDER — SODIUM CHLORIDE 9 MG/ML
INJECTION, SOLUTION INTRAVENOUS CONTINUOUS
Status: CANCELLED | OUTPATIENT
Start: 2022-06-05

## 2022-06-05 RX ORDER — OXYCODONE HYDROCHLORIDE 10 MG/1
10 TABLET ORAL ONCE
Status: DISCONTINUED | OUTPATIENT
Start: 2022-06-05 | End: 2022-06-05

## 2022-06-05 RX ORDER — HYDROMORPHONE HYDROCHLORIDE 2 MG/1
2 TABLET ORAL EVERY 4 HOURS PRN
Qty: 9 TABLET | Refills: 0 | Status: SHIPPED | OUTPATIENT
Start: 2022-06-05 | End: 2022-06-08

## 2022-06-05 RX ORDER — DIPHENHYDRAMINE HYDROCHLORIDE 50 MG/ML
25 INJECTION INTRAMUSCULAR; INTRAVENOUS PRN
Status: CANCELLED | OUTPATIENT
Start: 2022-06-05

## 2022-06-05 RX ORDER — HEPARIN SODIUM (PORCINE) LOCK FLUSH IV SOLN 100 UNIT/ML 100 UNIT/ML
500 SOLUTION INTRAVENOUS PRN
Status: CANCELLED | OUTPATIENT
Start: 2022-06-05

## 2022-06-05 RX ORDER — HYDROMORPHONE HYDROCHLORIDE 1 MG/ML
1 INJECTION, SOLUTION INTRAMUSCULAR; INTRAVENOUS; SUBCUTANEOUS ONCE
Status: COMPLETED | OUTPATIENT
Start: 2022-06-05 | End: 2022-06-05

## 2022-06-05 RX ADMIN — HYDROMORPHONE HYDROCHLORIDE 1 MG: 1 INJECTION, SOLUTION INTRAMUSCULAR; INTRAVENOUS; SUBCUTANEOUS at 01:47

## 2022-06-05 RX ADMIN — HYDROMORPHONE HYDROCHLORIDE 0.5 MG: 1 INJECTION, SOLUTION INTRAMUSCULAR; INTRAVENOUS; SUBCUTANEOUS at 03:34

## 2022-06-05 RX ADMIN — ONDANSETRON 4 MG: 4 TABLET, ORALLY DISINTEGRATING ORAL at 01:47

## 2022-06-05 ASSESSMENT — FIBROSIS 4 INDEX: FIB4 SCORE: 0.66

## 2022-06-05 ASSESSMENT — PAIN DESCRIPTION - PAIN TYPE: TYPE: ACUTE PAIN

## 2022-06-05 NOTE — ED PROVIDER NOTES
ED Provider Note    ED Provider Note    Scribed for Allison Cormier MD by Allison Cormier M.D.. 6/5/2022, 1:44 AM.    Primary care provider: Ella Matthew M.D.  Means of arrival: Private  History obtained from: Pt and family  History limited by:     CHIEF COMPLAINT  Chief Complaint   Patient presents with   • Knee Injury     Both knees  fell about 5-6 pm tonight landed on knees  having pain to both  swelling to L        HPI  Lilymonique Nicole is a 26 y.o. female who presents to the Emergency Department for evaluation of blunt trauma to both knees.  Patient notes mechanical ground-level fall tonight about 6 hours prior to arrival, she fell striking the front of both knees but is having pain more so to the left.  She notes associated swelling as well, again more to the left.  Pain is making it difficult to ambulate, no improvement with Tylenol, worse with attempting to bear weight.  Given significant pain despite Tylenol and ice she came to be assessed.  She has no lacerations and is not anticoagulated.    REVIEW OF SYSTEMS  Pertinent positives include blunt trauma to both knees from mechanical fall with pain limiting ambulation and swelling more so to the left knee. Pertinent negatives include no laceration, no numbness, no weakness, no anticoagulation.      PAST MEDICAL HISTORY   has a past medical history of Anemia (01/17/2018), AVF (arteriovenous fistula) ([facility]), Chest pain, Chest tightness, Daytime sleepiness, Dialysis patient ([facility]), Difficulty breathing, ESRD (end stage renal disease) on dialysis ([facility]) (01/17/2018), Gasping for breath, Heart burn, Hypertension (01/17/2018), Indigestion, Lupus ([facility]), Migraines (01/17/2018), Painful breathing, Palpitations, Seizure ([facility]) (2013), Shortness of breath, Swelling of lower extremity, and Wheezing.    SURGICAL HISTORY   has a past surgical history that includes ronak by laparoscopy (4/5/2010); av fistula creation (Right); angioplasty (01/17/2018);  other; other; gastroscopy-endo (12/9/2019); gastroscopy (N/A, 5/30/2020); gastroscopy-endo (9/18/2020); upper gi endoscopy,ctrl bleed (11/12/2020); upper gi endoscopy,biopsy (11/12/2020); gastroscopy-endo (11/12/2020); colonoscopy,diagnostic (1/8/2021); upper gi endoscopy,diagnosis (1/8/2021); upper gi endoscopy,ctrl bleed (1/8/2021); upper gi endoscopy,diagnosis (3/5/2021); upper gi endoscopy,diagnosis (N/A, 3/19/2021); upper gi endoscopy,ctrl bleed (3/19/2021); and bronchoscopy,diagnostic (Bilateral, 5/13/2021).    SOCIAL HISTORY  Social History     Tobacco Use   • Smoking status: Never Smoker   • Smokeless tobacco: Never Used   Vaping Use   • Vaping Use: Never used   Substance Use Topics   • Alcohol use: No   • Drug use: No      Social History     Substance and Sexual Activity   Drug Use No       FAMILY HISTORY  Family History   Problem Relation Age of Onset   • Diabetes Paternal Grandmother        CURRENT MEDICATIONS  Home Medications     Reviewed by Tara Tai R.N. (Registered Nurse) on 06/05/22 at 0113  Med List Status: Partial   Medication Last Dose Status   acetaminophen (TYLENOL) 500 MG Tab  Active   amLODIPine (NORVASC) 5 MG Tab  Active   atenolol (TENORMIN) 25 MG Tab  Active   cloNIDine (CATAPRES) 0.2 MG/24HR PATCH WEEKLY patch  Active   cloNIDine (CATAPRES) 0.3 MG Tab  Active   HYDROmorphone (DILAUDID) 2 MG Tab  Active   hydroxychloroquine (PLAQUENIL) 200 MG Tab  Active   mycophenolate sodium (MYFORTIC) 360 MG Tablet Delayed Response tablet  Active   ondansetron (ZOFRAN ODT) 4 MG TABLET DISPERSIBLE  Active   ondansetron (ZOFRAN) 4 MG Tab tablet  Active   pantoprazole (PROTONIX) 40 MG Tablet Delayed Response  Active   predniSONE (DELTASONE) 5 MG Tab  Active                ALLERGIES  Allergies   Allergen Reactions   • Cephalexin Rash     Nausea and rash   Hives  .   • Clindamycin Rash     Nausea and rash     Hive  .   • Methylprednisolone      Anxious  Other reaction(s): Other-Reaction in  "Comments  Anxious   • Metoprolol Rash and Nausea     Nausea and rash     .   • Compazine      C/o anxiety   • Fentanyl And Related Anxiety   • Hydrocodone-Acetaminophen Rash and Nausea     Generalized rash  Generalized rash   • Maxipime [Cefepime] Itching   • Reglan [Metoclopramide] Anxiety   • Tape Rash     Paper tape is ok       PHYSICAL EXAM  VITAL SIGNS: BP (!) 174/109   Pulse 90   Temp 37.3 °C (99.1 °F) (Temporal)   Resp 20   Ht 1.575 m (5' 2\")   Wt 49.9 kg (110 lb)   LMP 05/09/2022   SpO2 100%   BMI 20.12 kg/m²     General: Alert, no acute distress, appears uncomfortable  Skin: Warm, dry, normal for ethnicity  Head: Normocephalic, atraumatic  Neck: Trachea midline, no tenderness  Eye: PERRL, normal conjunctiva  ENMT: Oral mucosa moist, no pharyngeal erythema or exudate  Cardiovascular: Regular rate and rhythm, No murmur, Normal peripheral perfusion  Respiratory: Lungs CTA, respirations are non-labored, breath sounds are equal  Gastrointestinal: Soft, nontender, non distended  Musculoskeletal: No carolann deformity bed moderate effusion/swelling throughout the left knee.  Negative Lachman sign bilaterally, negative anterior and posterior drawer sign bilaterally, knees are stable bilaterally without carolann laxity.  Patient does have tenderness with regard to the left knee more so throughout the medial compartment, no step-off bilaterally.  General more mild tenderness throughout the anterior right knee.  Distal function is intact with regard to dorsal plantarflexion of the feet and toes, 2+ DP pulses, sensation intact to light touch with regard to lower extremities.  Painful range of motion at both knees more on the left with regard to both flexion and extension.  Neurological: Alert and oriented to person, place, time, and situation, neurovascular intact as noted above.  Lymphatics: No lower extremity lymphangitis  Psychiatric: Cooperative, appropriate mood & affect        RADIOLOGY  DX-KNEE 3 VIEWS LEFT " "  Final Result      No acute fracture.      DX-KNEE 3 VIEWS RIGHT   Final Result      No acute fracture.        The radiologist's interpretation of all radiological studies have been reviewed by me.    COURSE & MEDICAL DECISION MAKING  Pertinent Labs & Imaging studies reviewed. (See chart for details)    1:44 AM - Patient seen and examined at bedside. Patient will be treated with hydromorphone 1 mg given her allergy profile, Zofran ODT. Ordered x-ray imaging of both knees to evaluate her symptoms. The differential diagnoses include but are not limited to: tibial plateau fracture, patella fracture, contusion, sprain, effusion    0330: Patient reassessed, she no longer appears in significant distress and her tachycardia is improving.  She is still uncomfortable and as such I will order additional analgesia.  I updated her with reassuring x-ray imaging.  Ace wrap applied bilaterally for her contusion/effusion.    Patient Vitals for the past 24 hrs:   BP Temp Temp src Pulse Resp SpO2 Height Weight   06/05/22 0228 (!) 174/109 37.3 °C (99.1 °F) Temporal 90 20 100 % -- --   06/05/22 0136 (!) 193/128 -- -- (!) 105 -- 100 % -- --   06/05/22 0109 (!) 178/129 37.4 °C (99.4 °F) Temporal (!) 105 16 100 % -- --   06/05/22 0108 -- -- -- -- -- -- 1.575 m (5' 2\") 49.9 kg (110 lb)         Decision Making:  This is a 26 y.o. year old female who presents with blunt trauma to both knees with painful ambulation.  Thankfully she is neurovascular intact and demonstrates no lacerations nor abrasions.  Patient has history of chronic kidney disease and as such NSAIDs are contraindicated.  Patient does have an effusion to the left knee where she has most of her pain.  Given her risk factors and significant discomfort I am concerned for potential fracture, even considering the tibial plateau.  Thankfully x-rays are unremarkable, as such I suspect contusion and traumatic effusion.  Knees are Ace wrap and appropriate analgesia initiated.  She has " multiple medication allergies which does make it difficult with regard to treating her acute pain and limits options for outpatient analgesia.  NSAIDs contraindicated as noted above.    I reviewed prescription monitoring program for patient's narcotic use before prescribing a scheduled drug.The patient will not drink alcohol nor drive with prescribed medications      In prescribing controlled substances to this patient, I certify that I have obtained and reviewed the medical history this patient I have also made a good suly effort to obtain applicable records from other providers who have treated the patient and records did not demonstrate any increased risk of substance abuse that would prevent me from prescribing controlled substances.     I have conducted a physical exam and documented it. I have reviewed Ms. Martín Nicole’s prescription history as maintained by the [place] Prescription Monitoring Program.     I have assessed the patient’s risk for abuse, dependency, and addiction using the validated Opioid Risk Tool available at https://www.mdcalc.com/bxwvuu-mshj-swdf-ort-narcotic-abuse.     Given the above, I believe the benefits of controlled substance therapy outweigh the risks. The reasons for prescribing controlled substances include in my professional opinion, controlled substances are a reasonable choice for this patient. Accordingly, I have discussed the risk and benefits, treatment plan, and alternative therapies with the patient. The patient has been consented for the medication and understands the risks.    I reviewed prescription monitoring program for patient's narcotic use before prescribing a scheduled drug.The patient will not drink alcohol nor drive with prescribed medications. The patient will return for new or worsening symptoms and is stable at the time of discharge.    Patient has had high blood pressure while in the emergency department, felt likely secondary to medical condition. Counseled  patient to monitor blood pressure at home and follow up with primary care physician.     HTN/IDDM FOLLOW UP:  The patient has known hypertension and is being followed by their primary care doctor    DISPOSITION:  Patient will be discharged home in stable condition.    FOLLOW UP:  Ella Matthew M.D.  [address] [facility] [address]  [phone]    Schedule an appointment as soon as possible for a visit         OUTPATIENT MEDICATIONS:  Discharge Medication List as of 6/5/2022  3:32 AM      START taking these medications    Details   !! HYDROmorphone (DILAUDID) 2 MG Tab Take 1 Tablet by mouth every four hours as needed for Severe Pain for up to 3 days., Disp-9 Tablet, R-0, Normal       !! - Potential duplicate medications found. Please discuss with provider.            FINAL IMPRESSION  1. Effusion of left knee    2. Sprain of right knee, unspecified ligament, initial encounter    3. Contusion of left knee, initial encounter          Allison PARRA M.D. (Don[place]), am scribing for, and in the presence of, Allison Cormier MD.    Electronically signed by: Allison Cormier M.D. (Scribe), 6/5/2022    IAllison MD personally performed the services described in this documentation, as scribed by Allison Cormier M.D. in my presence, and it is both accurate and complete    The note accurately reflects work and decisions made by me.  Allison Cormier M.D.  6/5/2022  6:08 AM

## 2022-06-05 NOTE — ED NOTES
Bilat knees had ACE wraps applied. Pt has tolerated them well. Denies numbness/tingling to BLE distal to the ACEs. Peripheral pulses in BLE WNL. Pt verbalized understanding of d/c plan and medication to . Pt left in a WC, O2 at 4 liters.

## 2022-06-05 NOTE — ED TRIAGE NOTES
Pt comes in w/ family  C/o bilateral knee pain s/p falling on the  Was outside and was walking when her vision became blurry and last her footing falling and landing on both knees  Having pain and swelling to knees

## 2022-06-05 NOTE — PROGRESS NOTES
Pt arrived via wheelchair to IS for CBC/COD.  POC discussed.  Labs drawn from LFA, sent to lab for processing.  Hgb does not meet parameters for blood products, Hgb is 8.  Pt reports feeling well.  She states she feels like she can wait the two weeks for next CBC check.  Next appointment confirmed.  Pt discharged from IS in NAD under with family member.

## 2022-06-08 ENCOUNTER — APPOINTMENT (OUTPATIENT)
Dept: ONCOLOGY | Facility: MEDICAL CENTER | Age: 27
End: 2022-06-08
Attending: INTERNAL MEDICINE
Payer: COMMERCIAL

## 2022-06-10 ENCOUNTER — APPOINTMENT (OUTPATIENT)
Dept: RADIOLOGY | Facility: MEDICAL CENTER | Age: 27
DRG: 640 | End: 2022-06-10
Attending: EMERGENCY MEDICINE
Payer: COMMERCIAL

## 2022-06-10 ENCOUNTER — HOSPITAL ENCOUNTER (INPATIENT)
Facility: MEDICAL CENTER | Age: 27
LOS: 1 days | DRG: 640 | End: 2022-06-11
Attending: EMERGENCY MEDICINE | Admitting: INTERNAL MEDICINE
Payer: COMMERCIAL

## 2022-06-10 DIAGNOSIS — Z99.2 ESRD NEEDING DIALYSIS (HCC): ICD-10-CM

## 2022-06-10 DIAGNOSIS — E83.42 HYPOMAGNESEMIA: ICD-10-CM

## 2022-06-10 DIAGNOSIS — M79.10 MYALGIA: ICD-10-CM

## 2022-06-10 DIAGNOSIS — E87.5 ACUTE HYPERKALEMIA: ICD-10-CM

## 2022-06-10 DIAGNOSIS — N18.6 ESRD NEEDING DIALYSIS (HCC): ICD-10-CM

## 2022-06-10 DIAGNOSIS — I10 CHRONIC HYPERTENSION: ICD-10-CM

## 2022-06-10 DIAGNOSIS — E83.39 HYPOPHOSPHATEMIA: ICD-10-CM

## 2022-06-10 DIAGNOSIS — D64.9 SYMPTOMATIC ANEMIA: ICD-10-CM

## 2022-06-10 PROBLEM — F34.1 PERSISTENT DEPRESSIVE DISORDER WITH ANXIOUS DISTRESS, CURRENTLY MILD: Status: ACTIVE | Noted: 2022-06-10

## 2022-06-10 LAB
ABO GROUP BLD: NORMAL
ALBUMIN SERPL BCP-MCNC: 2.9 G/DL (ref 3.2–4.9)
ALBUMIN SERPL BCP-MCNC: 3.2 G/DL (ref 3.2–4.9)
ALBUMIN/GLOB SERPL: 0.5 G/DL
ALP SERPL-CCNC: 88 U/L (ref 30–99)
ALT SERPL-CCNC: 8 U/L (ref 2–50)
ANION GAP SERPL CALC-SCNC: 13 MMOL/L (ref 7–16)
APTT PPP: 30.9 SEC (ref 24.7–36)
AST SERPL-CCNC: 20 U/L (ref 12–45)
BARCODED ABORH UBTYP: 5100
BARCODED ABORH UBTYP: 5100
BARCODED PRD CODE UBPRD: NORMAL
BARCODED PRD CODE UBPRD: NORMAL
BARCODED UNIT NUM UBUNT: NORMAL
BARCODED UNIT NUM UBUNT: NORMAL
BASOPHILS # BLD AUTO: 0.3 % (ref 0–1.8)
BASOPHILS # BLD: 0.02 K/UL (ref 0–0.12)
BILIRUB SERPL-MCNC: 0.3 MG/DL (ref 0.1–1.5)
BLD GP AB SCN SERPL QL: NORMAL
BUN SERPL-MCNC: 18 MG/DL (ref 8–22)
BUN SERPL-MCNC: 43 MG/DL (ref 8–22)
CALCIUM SERPL-MCNC: 8.6 MG/DL (ref 8.4–10.2)
CALCIUM SERPL-MCNC: 8.9 MG/DL (ref 8.4–10.2)
CHLORIDE SERPL-SCNC: 86 MMOL/L (ref 96–112)
CHLORIDE SERPL-SCNC: 95 MMOL/L (ref 96–112)
CO2 SERPL-SCNC: 27 MMOL/L (ref 20–33)
CO2 SERPL-SCNC: 29 MMOL/L (ref 20–33)
COMPONENT R 8504R: NORMAL
COMPONENT R 8504R: NORMAL
CREAT SERPL-MCNC: 2.91 MG/DL (ref 0.5–1.4)
CREAT SERPL-MCNC: 5.33 MG/DL (ref 0.5–1.4)
EKG IMPRESSION: NORMAL
EKG IMPRESSION: NORMAL
EOSINOPHIL # BLD AUTO: 0.03 K/UL (ref 0–0.51)
EOSINOPHIL NFR BLD: 0.4 % (ref 0–6.9)
ERYTHROCYTE [DISTWIDTH] IN BLOOD BY AUTOMATED COUNT: 63.4 FL (ref 35.9–50)
ERYTHROCYTE [DISTWIDTH] IN BLOOD BY AUTOMATED COUNT: 66.2 FL (ref 35.9–50)
FLUAV RNA SPEC QL NAA+PROBE: NEGATIVE
FLUBV RNA SPEC QL NAA+PROBE: NEGATIVE
GFR SERPLBLD CREATININE-BSD FMLA CKD-EPI: 11 ML/MIN/1.73 M 2
GFR SERPLBLD CREATININE-BSD FMLA CKD-EPI: 22 ML/MIN/1.73 M 2
GLOBULIN SER CALC-MCNC: 5.5 G/DL (ref 1.9–3.5)
GLUCOSE BLD STRIP.AUTO-MCNC: 73 MG/DL (ref 65–99)
GLUCOSE BLD STRIP.AUTO-MCNC: 80 MG/DL (ref 65–99)
GLUCOSE SERPL-MCNC: 81 MG/DL (ref 65–99)
GLUCOSE SERPL-MCNC: 86 MG/DL (ref 65–99)
HAV IGM SERPL QL IA: NORMAL
HBV CORE IGM SER QL: NORMAL
HBV SURFACE AB SERPL IA-ACNC: 717 MIU/ML (ref 0–10)
HBV SURFACE AG SER QL: NORMAL
HCG SERPL QL: NEGATIVE
HCT VFR BLD AUTO: 23 % (ref 37–47)
HCT VFR BLD AUTO: 30.8 % (ref 37–47)
HCV AB SER QL: NORMAL
HGB BLD-MCNC: 6.7 G/DL (ref 12–16)
HGB BLD-MCNC: 9.2 G/DL (ref 12–16)
IMM GRANULOCYTES # BLD AUTO: 0.04 K/UL (ref 0–0.11)
IMM GRANULOCYTES NFR BLD AUTO: 0.5 % (ref 0–0.9)
INR PPP: 1.2 (ref 0.87–1.13)
LACTATE BLD-SCNC: 1.8 MMOL/L (ref 0.5–2)
LYMPHOCYTES # BLD AUTO: 0.75 K/UL (ref 1–4.8)
LYMPHOCYTES NFR BLD: 10.1 % (ref 22–41)
MAGNESIUM SERPL-MCNC: 1.4 MG/DL (ref 1.5–2.5)
MCH RBC QN AUTO: 26.2 PG (ref 27–33)
MCH RBC QN AUTO: 26.8 PG (ref 27–33)
MCHC RBC AUTO-ENTMCNC: 29.1 G/DL (ref 33.6–35)
MCHC RBC AUTO-ENTMCNC: 29.9 G/DL (ref 33.6–35)
MCV RBC AUTO: 89.8 FL (ref 81.4–97.8)
MCV RBC AUTO: 89.8 FL (ref 81.4–97.8)
MONOCYTES # BLD AUTO: 0.3 K/UL (ref 0–0.85)
MONOCYTES NFR BLD AUTO: 4.1 % (ref 0–13.4)
NEUTROPHILS # BLD AUTO: 6.26 K/UL (ref 2–7.15)
NEUTROPHILS NFR BLD: 84.6 % (ref 44–72)
NRBC # BLD AUTO: 0 K/UL
NRBC BLD-RTO: 0 /100 WBC
PHOSPHATE SERPL-MCNC: 3.7 MG/DL (ref 2.5–4.5)
PHOSPHATE SERPL-MCNC: 4.9 MG/DL (ref 2.5–4.5)
PLATELET # BLD AUTO: 245 K/UL (ref 164–446)
PLATELET # BLD AUTO: 276 K/UL (ref 164–446)
PMV BLD AUTO: 9.1 FL (ref 9–12.9)
PMV BLD AUTO: 9.7 FL (ref 9–12.9)
POTASSIUM SERPL-SCNC: 4.4 MMOL/L (ref 3.6–5.5)
POTASSIUM SERPL-SCNC: 6.1 MMOL/L (ref 3.6–5.5)
PRODUCT TYPE UPROD: NORMAL
PRODUCT TYPE UPROD: NORMAL
PROT SERPL-MCNC: 8.4 G/DL (ref 6–8.2)
PROTHROMBIN TIME: 14.3 SEC (ref 12–14.6)
RBC # BLD AUTO: 2.56 M/UL (ref 4.2–5.4)
RBC # BLD AUTO: 3.43 M/UL (ref 4.2–5.4)
RH BLD: NORMAL
RSV RNA SPEC QL NAA+PROBE: NEGATIVE
SARS-COV-2 RNA RESP QL NAA+PROBE: NOTDETECTED
SODIUM SERPL-SCNC: 126 MMOL/L (ref 135–145)
SODIUM SERPL-SCNC: 134 MMOL/L (ref 135–145)
SPECIMEN SOURCE: NORMAL
TROPONIN T SERPL-MCNC: 500 NG/L (ref 6–19)
TROPONIN T SERPL-MCNC: 696 NG/L (ref 6–19)
TROPONIN T SERPL-MCNC: 766 NG/L (ref 6–19)
UNIT STATUS USTAT: NORMAL
UNIT STATUS USTAT: NORMAL
WBC # BLD AUTO: 5.2 K/UL (ref 4.8–10.8)
WBC # BLD AUTO: 7.4 K/UL (ref 4.8–10.8)

## 2022-06-10 PROCEDURE — 80069 RENAL FUNCTION PANEL: CPT

## 2022-06-10 PROCEDURE — A9270 NON-COVERED ITEM OR SERVICE: HCPCS | Performed by: INTERNAL MEDICINE

## 2022-06-10 PROCEDURE — 86922 COMPATIBILITY TEST ANTIGLOB: CPT | Mod: 91

## 2022-06-10 PROCEDURE — 90935 HEMODIALYSIS ONE EVALUATION: CPT

## 2022-06-10 PROCEDURE — 85027 COMPLETE CBC AUTOMATED: CPT

## 2022-06-10 PROCEDURE — 700102 HCHG RX REV CODE 250 W/ 637 OVERRIDE(OP): Performed by: EMERGENCY MEDICINE

## 2022-06-10 PROCEDURE — 96365 THER/PROPH/DIAG IV INF INIT: CPT

## 2022-06-10 PROCEDURE — 86901 BLOOD TYPING SEROLOGIC RH(D): CPT

## 2022-06-10 PROCEDURE — 700111 HCHG RX REV CODE 636 W/ 250 OVERRIDE (IP): Performed by: INTERNAL MEDICINE

## 2022-06-10 PROCEDURE — 86945 BLOOD PRODUCT/IRRADIATION: CPT

## 2022-06-10 PROCEDURE — 96367 TX/PROPH/DG ADDL SEQ IV INF: CPT

## 2022-06-10 PROCEDURE — 96375 TX/PRO/DX INJ NEW DRUG ADDON: CPT

## 2022-06-10 PROCEDURE — A9270 NON-COVERED ITEM OR SERVICE: HCPCS | Performed by: EMERGENCY MEDICINE

## 2022-06-10 PROCEDURE — 99220 PR INITIAL OBSERVATION CARE,LEVL III: CPT | Performed by: INTERNAL MEDICINE

## 2022-06-10 PROCEDURE — 700102 HCHG RX REV CODE 250 W/ 637 OVERRIDE(OP): Performed by: INTERNAL MEDICINE

## 2022-06-10 PROCEDURE — 93005 ELECTROCARDIOGRAM TRACING: CPT | Performed by: EMERGENCY MEDICINE

## 2022-06-10 PROCEDURE — C9803 HOPD COVID-19 SPEC COLLECT: HCPCS | Performed by: EMERGENCY MEDICINE

## 2022-06-10 PROCEDURE — 94760 N-INVAS EAR/PLS OXIMETRY 1: CPT

## 2022-06-10 PROCEDURE — 86039 ANTINUCLEAR ANTIBODIES (ANA): CPT

## 2022-06-10 PROCEDURE — 96366 THER/PROPH/DIAG IV INF ADDON: CPT

## 2022-06-10 PROCEDURE — 80053 COMPREHEN METABOLIC PANEL: CPT

## 2022-06-10 PROCEDURE — 770020 HCHG ROOM/CARE - TELE (206)

## 2022-06-10 PROCEDURE — 0241U HCHG SARS-COV-2 COVID-19 NFCT DS RESP RNA 4 TRGT MIC: CPT

## 2022-06-10 PROCEDURE — P9016 RBC LEUKOCYTES REDUCED: HCPCS

## 2022-06-10 PROCEDURE — 86850 RBC ANTIBODY SCREEN: CPT

## 2022-06-10 PROCEDURE — 80074 ACUTE HEPATITIS PANEL: CPT

## 2022-06-10 PROCEDURE — 96376 TX/PRO/DX INJ SAME DRUG ADON: CPT

## 2022-06-10 PROCEDURE — 82962 GLUCOSE BLOOD TEST: CPT

## 2022-06-10 PROCEDURE — 83605 ASSAY OF LACTIC ACID: CPT

## 2022-06-10 PROCEDURE — 71045 X-RAY EXAM CHEST 1 VIEW: CPT

## 2022-06-10 PROCEDURE — 86706 HEP B SURFACE ANTIBODY: CPT

## 2022-06-10 PROCEDURE — 84703 CHORIONIC GONADOTROPIN ASSAY: CPT

## 2022-06-10 PROCEDURE — 700111 HCHG RX REV CODE 636 W/ 250 OVERRIDE (IP): Performed by: EMERGENCY MEDICINE

## 2022-06-10 PROCEDURE — 5A1D70Z PERFORMANCE OF URINARY FILTRATION, INTERMITTENT, LESS THAN 6 HOURS PER DAY: ICD-10-PCS | Performed by: INTERNAL MEDICINE

## 2022-06-10 PROCEDURE — 85610 PROTHROMBIN TIME: CPT

## 2022-06-10 PROCEDURE — 700105 HCHG RX REV CODE 258: Performed by: EMERGENCY MEDICINE

## 2022-06-10 PROCEDURE — 84100 ASSAY OF PHOSPHORUS: CPT

## 2022-06-10 PROCEDURE — 86900 BLOOD TYPING SEROLOGIC ABO: CPT

## 2022-06-10 PROCEDURE — 700111 HCHG RX REV CODE 636 W/ 250 OVERRIDE (IP): Performed by: HOSPITALIST

## 2022-06-10 PROCEDURE — 85730 THROMBOPLASTIN TIME PARTIAL: CPT

## 2022-06-10 PROCEDURE — 99285 EMERGENCY DEPT VISIT HI MDM: CPT

## 2022-06-10 PROCEDURE — 99255 IP/OBS CONSLTJ NEW/EST HI 80: CPT | Performed by: INTERNAL MEDICINE

## 2022-06-10 PROCEDURE — 86256 FLUORESCENT ANTIBODY TITER: CPT

## 2022-06-10 PROCEDURE — 86038 ANTINUCLEAR ANTIBODIES: CPT

## 2022-06-10 PROCEDURE — 86902 BLOOD TYPE ANTIGEN DONOR EA: CPT | Mod: 91

## 2022-06-10 PROCEDURE — 86225 DNA ANTIBODY NATIVE: CPT

## 2022-06-10 PROCEDURE — 83735 ASSAY OF MAGNESIUM: CPT

## 2022-06-10 PROCEDURE — 84484 ASSAY OF TROPONIN QUANT: CPT

## 2022-06-10 PROCEDURE — 36430 TRANSFUSION BLD/BLD COMPNT: CPT

## 2022-06-10 PROCEDURE — 36415 COLL VENOUS BLD VENIPUNCTURE: CPT

## 2022-06-10 PROCEDURE — 86235 NUCLEAR ANTIGEN ANTIBODY: CPT | Mod: 91

## 2022-06-10 PROCEDURE — 85025 COMPLETE CBC W/AUTO DIFF WBC: CPT

## 2022-06-10 RX ORDER — BISACODYL 10 MG
10 SUPPOSITORY, RECTAL RECTAL
Status: DISCONTINUED | OUTPATIENT
Start: 2022-06-10 | End: 2022-06-11 | Stop reason: HOSPADM

## 2022-06-10 RX ORDER — ONDANSETRON 2 MG/ML
4 INJECTION INTRAMUSCULAR; INTRAVENOUS ONCE
Status: COMPLETED | OUTPATIENT
Start: 2022-06-10 | End: 2022-06-10

## 2022-06-10 RX ORDER — PANTOPRAZOLE SODIUM 40 MG/1
40 TABLET, DELAYED RELEASE ORAL 2 TIMES DAILY
Status: DISCONTINUED | OUTPATIENT
Start: 2022-06-10 | End: 2022-06-10

## 2022-06-10 RX ORDER — DIPHENHYDRAMINE HYDROCHLORIDE 50 MG/ML
25 INJECTION INTRAMUSCULAR; INTRAVENOUS
Status: COMPLETED | OUTPATIENT
Start: 2022-06-10 | End: 2022-06-10

## 2022-06-10 RX ORDER — HYDROMORPHONE HYDROCHLORIDE 2 MG/1
2 TABLET ORAL EVERY 4 HOURS PRN
Status: DISCONTINUED | OUTPATIENT
Start: 2022-06-10 | End: 2022-06-11 | Stop reason: HOSPADM

## 2022-06-10 RX ORDER — HYDROMORPHONE HYDROCHLORIDE 2 MG/1
1 TABLET ORAL
Status: DISCONTINUED | OUTPATIENT
Start: 2022-06-10 | End: 2022-06-11 | Stop reason: HOSPADM

## 2022-06-10 RX ORDER — AMLODIPINE BESYLATE 5 MG/1
5 TABLET ORAL EVERY EVENING
Status: DISCONTINUED | OUTPATIENT
Start: 2022-06-10 | End: 2022-06-11 | Stop reason: HOSPADM

## 2022-06-10 RX ORDER — CLONIDINE 0.2 MG/24H
1 PATCH, EXTENDED RELEASE TRANSDERMAL
Status: DISCONTINUED | OUTPATIENT
Start: 2022-06-10 | End: 2022-06-11 | Stop reason: HOSPADM

## 2022-06-10 RX ORDER — OMEPRAZOLE 20 MG/1
20 CAPSULE, DELAYED RELEASE ORAL 2 TIMES DAILY
Status: DISCONTINUED | OUTPATIENT
Start: 2022-06-10 | End: 2022-06-11 | Stop reason: HOSPADM

## 2022-06-10 RX ORDER — ONDANSETRON 2 MG/ML
4 INJECTION INTRAMUSCULAR; INTRAVENOUS EVERY 4 HOURS PRN
Status: DISCONTINUED | OUTPATIENT
Start: 2022-06-10 | End: 2022-06-11 | Stop reason: HOSPADM

## 2022-06-10 RX ORDER — HYDROXYCHLOROQUINE SULFATE 200 MG/1
200 TABLET, FILM COATED ORAL EVERY EVENING
Status: DISCONTINUED | OUTPATIENT
Start: 2022-06-10 | End: 2022-06-11 | Stop reason: HOSPADM

## 2022-06-10 RX ORDER — OXYCODONE HYDROCHLORIDE 5 MG/1
2.5 TABLET ORAL
Status: DISCONTINUED | OUTPATIENT
Start: 2022-06-10 | End: 2022-06-10

## 2022-06-10 RX ORDER — ATENOLOL 25 MG/1
25 TABLET ORAL DAILY
Status: DISCONTINUED | OUTPATIENT
Start: 2022-06-10 | End: 2022-06-11 | Stop reason: HOSPADM

## 2022-06-10 RX ORDER — AMOXICILLIN 250 MG
2 CAPSULE ORAL 2 TIMES DAILY
Status: DISCONTINUED | OUTPATIENT
Start: 2022-06-10 | End: 2022-06-11 | Stop reason: HOSPADM

## 2022-06-10 RX ORDER — MAGNESIUM SULFATE HEPTAHYDRATE 40 MG/ML
2 INJECTION, SOLUTION INTRAVENOUS ONCE
Status: COMPLETED | OUTPATIENT
Start: 2022-06-10 | End: 2022-06-10

## 2022-06-10 RX ORDER — PREDNISONE 5 MG/1
5 TABLET ORAL EVERY EVENING
Status: DISCONTINUED | OUTPATIENT
Start: 2022-06-10 | End: 2022-06-11 | Stop reason: HOSPADM

## 2022-06-10 RX ORDER — ONDANSETRON 4 MG/1
4 TABLET, ORALLY DISINTEGRATING ORAL EVERY 4 HOURS PRN
Status: DISCONTINUED | OUTPATIENT
Start: 2022-06-10 | End: 2022-06-11 | Stop reason: HOSPADM

## 2022-06-10 RX ORDER — HYDROMORPHONE HYDROCHLORIDE 1 MG/ML
1 INJECTION, SOLUTION INTRAMUSCULAR; INTRAVENOUS; SUBCUTANEOUS ONCE
Status: COMPLETED | OUTPATIENT
Start: 2022-06-10 | End: 2022-06-10

## 2022-06-10 RX ORDER — LORAZEPAM 2 MG/ML
0.5 INJECTION INTRAMUSCULAR EVERY 4 HOURS PRN
Status: DISCONTINUED | OUTPATIENT
Start: 2022-06-10 | End: 2022-06-11 | Stop reason: HOSPADM

## 2022-06-10 RX ORDER — ACETAMINOPHEN 500 MG
1000 TABLET ORAL
Status: COMPLETED | OUTPATIENT
Start: 2022-06-10 | End: 2022-06-10

## 2022-06-10 RX ORDER — MYCOPHENOLIC ACID 360 MG/1
360 TABLET, DELAYED RELEASE ORAL EVERY EVENING
Status: DISCONTINUED | OUTPATIENT
Start: 2022-06-10 | End: 2022-06-10

## 2022-06-10 RX ORDER — ACETAMINOPHEN 325 MG/1
650 TABLET ORAL EVERY 6 HOURS PRN
Status: DISCONTINUED | OUTPATIENT
Start: 2022-06-10 | End: 2022-06-10

## 2022-06-10 RX ORDER — HYDROMORPHONE HYDROCHLORIDE 1 MG/ML
1 INJECTION, SOLUTION INTRAMUSCULAR; INTRAVENOUS; SUBCUTANEOUS
Status: DISPENSED | OUTPATIENT
Start: 2022-06-10 | End: 2022-06-11

## 2022-06-10 RX ORDER — OXYCODONE HYDROCHLORIDE 5 MG/1
5 TABLET ORAL
Status: DISCONTINUED | OUTPATIENT
Start: 2022-06-10 | End: 2022-06-10

## 2022-06-10 RX ORDER — CALCIUM GLUCONATE 20 MG/ML
2 INJECTION, SOLUTION INTRAVENOUS ONCE
Status: COMPLETED | OUTPATIENT
Start: 2022-06-10 | End: 2022-06-10

## 2022-06-10 RX ORDER — ACETAMINOPHEN 325 MG/1
650 TABLET ORAL EVERY 6 HOURS PRN
Status: DISCONTINUED | OUTPATIENT
Start: 2022-06-10 | End: 2022-06-11 | Stop reason: HOSPADM

## 2022-06-10 RX ORDER — POLYETHYLENE GLYCOL 3350 17 G/17G
1 POWDER, FOR SOLUTION ORAL
Status: DISCONTINUED | OUTPATIENT
Start: 2022-06-10 | End: 2022-06-11 | Stop reason: HOSPADM

## 2022-06-10 RX ORDER — HYDRALAZINE HYDROCHLORIDE 20 MG/ML
10 INJECTION INTRAMUSCULAR; INTRAVENOUS EVERY 4 HOURS PRN
Status: DISCONTINUED | OUTPATIENT
Start: 2022-06-10 | End: 2022-06-11 | Stop reason: HOSPADM

## 2022-06-10 RX ORDER — MYCOPHENOLATE MOFETIL 250 MG/1
500 CAPSULE ORAL EVERY EVENING
Status: DISCONTINUED | OUTPATIENT
Start: 2022-06-10 | End: 2022-06-11 | Stop reason: HOSPADM

## 2022-06-10 RX ORDER — HYDROMORPHONE HYDROCHLORIDE 1 MG/ML
0.25 INJECTION, SOLUTION INTRAMUSCULAR; INTRAVENOUS; SUBCUTANEOUS
Status: DISCONTINUED | OUTPATIENT
Start: 2022-06-10 | End: 2022-06-10

## 2022-06-10 RX ORDER — CLONIDINE HYDROCHLORIDE 0.1 MG/1
0.3 TABLET ORAL 2 TIMES DAILY
Status: DISCONTINUED | OUTPATIENT
Start: 2022-06-10 | End: 2022-06-11 | Stop reason: HOSPADM

## 2022-06-10 RX ADMIN — ONDANSETRON 4 MG: 2 INJECTION INTRAMUSCULAR; INTRAVENOUS at 04:26

## 2022-06-10 RX ADMIN — LORAZEPAM 0.5 MG: 2 INJECTION INTRAMUSCULAR; INTRAVENOUS at 22:27

## 2022-06-10 RX ADMIN — ONDANSETRON 4 MG: 2 INJECTION INTRAMUSCULAR; INTRAVENOUS at 20:04

## 2022-06-10 RX ADMIN — PREDNISONE 5 MG: 5 TABLET ORAL at 18:13

## 2022-06-10 RX ADMIN — HYDROMORPHONE HYDROCHLORIDE 2 MG: 2 TABLET ORAL at 09:16

## 2022-06-10 RX ADMIN — ONDANSETRON 4 MG: 2 INJECTION INTRAMUSCULAR; INTRAVENOUS at 09:23

## 2022-06-10 RX ADMIN — OMEPRAZOLE 20 MG: 20 CAPSULE, DELAYED RELEASE ORAL at 18:13

## 2022-06-10 RX ADMIN — ACETAMINOPHEN 650 MG: 325 TABLET, FILM COATED ORAL at 22:27

## 2022-06-10 RX ADMIN — HYDROMORPHONE HYDROCHLORIDE 1 MG: 1 INJECTION, SOLUTION INTRAMUSCULAR; INTRAVENOUS; SUBCUTANEOUS at 04:27

## 2022-06-10 RX ADMIN — SODIUM BICARBONATE 50 MEQ: 84 INJECTION, SOLUTION INTRAVENOUS at 05:36

## 2022-06-10 RX ADMIN — CLONIDINE HYDROCHLORIDE 0.3 MG: 0.1 TABLET ORAL at 18:16

## 2022-06-10 RX ADMIN — CLONIDINE 1 PATCH: 0.2 PATCH TRANSDERMAL at 14:59

## 2022-06-10 RX ADMIN — HYDROMORPHONE HYDROCHLORIDE 0.25 MG: 1 INJECTION, SOLUTION INTRAMUSCULAR; INTRAVENOUS; SUBCUTANEOUS at 14:58

## 2022-06-10 RX ADMIN — MYCOPHENOLATE MOFETIL 500 MG: 250 CAPSULE ORAL at 18:13

## 2022-06-10 RX ADMIN — CALCIUM GLUCONATE 2 G: 20 INJECTION, SOLUTION INTRAVENOUS at 06:03

## 2022-06-10 RX ADMIN — HYDROMORPHONE HYDROCHLORIDE 1 MG: 1 INJECTION, SOLUTION INTRAMUSCULAR; INTRAVENOUS; SUBCUTANEOUS at 20:50

## 2022-06-10 RX ADMIN — INSULIN HUMAN 2.5 UNITS: 100 INJECTION, SOLUTION PARENTERAL at 05:38

## 2022-06-10 RX ADMIN — DIPHENHYDRAMINE HYDROCHLORIDE 25 MG: 50 INJECTION INTRAMUSCULAR; INTRAVENOUS at 11:42

## 2022-06-10 RX ADMIN — MAGNESIUM SULFATE 2 G: 2 INJECTION INTRAVENOUS at 07:35

## 2022-06-10 RX ADMIN — HYDROMORPHONE HYDROCHLORIDE 0.25 MG: 1 INJECTION, SOLUTION INTRAMUSCULAR; INTRAVENOUS; SUBCUTANEOUS at 10:29

## 2022-06-10 RX ADMIN — EPOETIN ALFA-EPBX 2000 UNITS: 2000 INJECTION, SOLUTION INTRAVENOUS; SUBCUTANEOUS at 12:00

## 2022-06-10 RX ADMIN — DEXTROSE MONOHYDRATE 25 G: 100 INJECTION, SOLUTION INTRAVENOUS at 05:38

## 2022-06-10 RX ADMIN — AMLODIPINE BESYLATE 5 MG: 5 TABLET ORAL at 18:13

## 2022-06-10 RX ADMIN — ACETAMINOPHEN 1000 MG: 500 TABLET ORAL at 11:40

## 2022-06-10 RX ADMIN — SENNOSIDES AND DOCUSATE SODIUM 2 TABLET: 50; 8.6 TABLET ORAL at 07:40

## 2022-06-10 RX ADMIN — HYDROXYCHLOROQUINE SULFATE 200 MG: 200 TABLET, FILM COATED ORAL at 18:13

## 2022-06-10 ASSESSMENT — ENCOUNTER SYMPTOMS
PALPITATIONS: 0
FALLS: 0
SHORTNESS OF BREATH: 1
TINGLING: 0
WEAKNESS: 0
COUGH: 0
HEADACHES: 0
STRIDOR: 0
DIARRHEA: 0
NAUSEA: 0
ABDOMINAL PAIN: 0
SPUTUM PRODUCTION: 0
FEVER: 0
CHILLS: 0
LOSS OF CONSCIOUSNESS: 0
MYALGIAS: 1
DEPRESSION: 0
DIZZINESS: 0
CONSTIPATION: 0
VOMITING: 0

## 2022-06-10 ASSESSMENT — LIFESTYLE VARIABLES
EVER FELT BAD OR GUILTY ABOUT YOUR DRINKING: NO
TOTAL SCORE: 0
AVERAGE NUMBER OF DAYS PER WEEK YOU HAVE A DRINK CONTAINING ALCOHOL: 0
TOTAL SCORE: 0
ON A TYPICAL DAY WHEN YOU DRINK ALCOHOL HOW MANY DRINKS DO YOU HAVE: 0
CONSUMPTION TOTAL: NEGATIVE
EVER HAD A DRINK FIRST THING IN THE MORNING TO STEADY YOUR NERVES TO GET RID OF A HANGOVER: NO
HOW MANY TIMES IN THE PAST YEAR HAVE YOU HAD 5 OR MORE DRINKS IN A DAY: 0
HAVE PEOPLE ANNOYED YOU BY CRITICIZING YOUR DRINKING: NO
TOTAL SCORE: 0
ALCOHOL_USE: NO
HAVE YOU EVER FELT YOU SHOULD CUT DOWN ON YOUR DRINKING: NO

## 2022-06-10 ASSESSMENT — COGNITIVE AND FUNCTIONAL STATUS - GENERAL
CLIMB 3 TO 5 STEPS WITH RAILING: A LITTLE
STANDING UP FROM CHAIR USING ARMS: A LITTLE
SUGGESTED CMS G CODE MODIFIER DAILY ACTIVITY: CH
MOVING FROM LYING ON BACK TO SITTING ON SIDE OF FLAT BED: A LITTLE
WALKING IN HOSPITAL ROOM: A LITTLE
DAILY ACTIVITIY SCORE: 24
MOVING TO AND FROM BED TO CHAIR: A LITTLE
MOBILITY SCORE: 19
SUGGESTED CMS G CODE MODIFIER MOBILITY: CK

## 2022-06-10 ASSESSMENT — PAIN DESCRIPTION - PAIN TYPE
TYPE: ACUTE PAIN;CHRONIC PAIN
TYPE: ACUTE PAIN;CHRONIC PAIN
TYPE: ACUTE PAIN

## 2022-06-10 ASSESSMENT — FIBROSIS 4 INDEX
FIB4 SCORE: 0.54
FIB4 SCORE: 0.61

## 2022-06-10 NOTE — ED NOTES
Arianna from Lab called with critical result of Trop 500 at 0455. Critical lab result read back to Arianna.   Dr. Cormier notified of critical lab result at 0455.  Critical lab result read back by Dr. Cormier.

## 2022-06-10 NOTE — ED NOTES
Patient is screaming & wailing in pain. MD Vega notified. New PRN orders created by MD. Patient medicated for 10/10 pain.    Consent for blood transfusion reviewed & signed by patient. Mother at bedside.

## 2022-06-10 NOTE — ED NOTES
Report received from MICHELLE Thomson.    Patient is resting comfortably on gurney. Mother sitting at bedside.    Patient states during previous predicaments when she requires blood transfusion & dialysis on the same day, the transfusion occurs at the same time as dialysis. Patient also states she requires IV Benadryl & PO Tylenol prior to transfusion. MD notified.

## 2022-06-10 NOTE — ASSESSMENT & PLAN NOTE
- Chronic, does get dialysis Monday, Wednesday Friday  -Technical difficulties with dialysis on Wednesday so it was not a full session  -Now with hyperkalemia  -ERP did discuss the case with nephrology who is planning hemodialysis

## 2022-06-10 NOTE — ED NOTES
Patient medicated for 9/10 generalized pain per MAR.    Dialysis RN, Sidney, states she will be available after her current dialysis at approx 10:30. Patient updated on plan of care.

## 2022-06-10 NOTE — ED PROVIDER NOTES
ED Provider Note    ED Provider Note    Scribed for Allison Cormier MD by Allison Cormier M.D.. 6/10/2022, 3:58 AM.    Primary care provider: Ella Matthew M.D.  Means of arrival: Private  History obtained from: Patient  History limited by: None    CHIEF COMPLAINT  Chief Complaint   Patient presents with   • Shortness of Breath     Pt reports it started tonight. Denies cough of fevers.    • Joint Pain     Pt reports an achy feeling all over that started this morning.        HPI  Lily Nicole is a 26 y.o. female who presents to the Emergency Department for evaluation of acute dyspnea.  Patient has unfortunate medical history of lupus, end-stage renal failure on dialysis, chronic respiratory insufficiency, and anemia of chronic disease.  She does dialysis Monday Wednesday and Friday.  She relates when she was at dialysis on Wednesday due to technical issues she was unable to completely finish her session; she also relates last hemoglobin was 8, similar to her previous baselines.  She usually requires transfusions every month.  She notes this morning she woke from sleep feeling very out of breath, worse when trying to lie down, oxygen requirement increased from her usual of 3 L to 4 L.  No productive cough, no fever but does endorse generalized body aches more so again this morning without acute trauma.  Given severity of her symptoms she came to be assessed.    REVIEW OF SYSTEMS  Pertinent positives include acute dyspnea with orthopnea, increased oxygen requirement, generalized body aches. Pertinent negatives include no productive cough, fever, no acute trauma.  All other systems reviewed and negative.    PAST MEDICAL HISTORY   has a past medical history of Anemia (01/17/2018), AVF (arteriovenous fistula) ([facility]), Chest pain, Chest tightness, Daytime sleepiness, Dialysis patient ([facility]), Difficulty breathing, ESRD (end stage renal disease) on dialysis ([facility]) (01/17/2018), Gasping for breath, Heart  burn, Hypertension (01/17/2018), Indigestion, Lupus (HCC), Migraines (01/17/2018), Painful breathing, Palpitations, Seizure (HCC) (2013), Shortness of breath, Swelling of lower extremity, and Wheezing.    SURGICAL HISTORY   has a past surgical history that includes ronak by laparoscopy (4/5/2010); av fistula creation (Right); angioplasty (01/17/2018); other; other; gastroscopy-endo (12/9/2019); gastroscopy (N/A, 5/30/2020); gastroscopy-endo (9/18/2020); upper gi endoscopy,ctrl bleed (11/12/2020); upper gi endoscopy,biopsy (11/12/2020); gastroscopy-endo (11/12/2020); colonoscopy,diagnostic (1/8/2021); upper gi endoscopy,diagnosis (1/8/2021); upper gi endoscopy,ctrl bleed (1/8/2021); upper gi endoscopy,diagnosis (3/5/2021); upper gi endoscopy,diagnosis (N/A, 3/19/2021); upper gi endoscopy,ctrl bleed (3/19/2021); and bronchoscopy,diagnostic (Bilateral, 5/13/2021).    SOCIAL HISTORY  Social History     Tobacco Use   • Smoking status: Never Smoker   • Smokeless tobacco: Never Used   Vaping Use   • Vaping Use: Never used   Substance Use Topics   • Alcohol use: No   • Drug use: No      Social History     Substance and Sexual Activity   Drug Use No       FAMILY HISTORY  Family History   Problem Relation Age of Onset   • Diabetes Paternal Grandmother        CURRENT MEDICATIONS  Home Medications     Reviewed by Tessa Lundberg R.N. (Registered Nurse) on 06/10/22 at 0343  Med List Status: Not Addressed   Medication Last Dose Status   acetaminophen (TYLENOL) 500 MG Tab  Active   amLODIPine (NORVASC) 5 MG Tab  Active   atenolol (TENORMIN) 25 MG Tab  Active   cloNIDine (CATAPRES) 0.2 MG/24HR PATCH WEEKLY patch  Active   cloNIDine (CATAPRES) 0.3 MG Tab  Active   HYDROmorphone (DILAUDID) 2 MG Tab  Active   hydroxychloroquine (PLAQUENIL) 200 MG Tab  Active   mycophenolate sodium (MYFORTIC) 360 MG Tablet Delayed Response tablet  Active   ondansetron (ZOFRAN ODT) 4 MG TABLET DISPERSIBLE  Active   ondansetron (ZOFRAN) 4 MG Tab tablet   "Active   pantoprazole (PROTONIX) 40 MG Tablet Delayed Response  Active   predniSONE (DELTASONE) 5 MG Tab  Active                ALLERGIES  Allergies   Allergen Reactions   • Cephalexin Rash     Nausea and rash   Hives  .   • Clindamycin Rash     Nausea and rash     Hive  .   • Methylprednisolone      Anxious  Other reaction(s): Other-Reaction in Comments  Anxious   • Metoprolol Rash and Nausea     Nausea and rash     .   • Compazine      C/o anxiety   • Fentanyl And Related Anxiety   • Hydrocodone-Acetaminophen Rash and Nausea     Generalized rash  Generalized rash   • Maxipime [Cefepime] Itching   • Reglan [Metoclopramide] Anxiety   • Tape Rash     Paper tape is ok       PHYSICAL EXAM  VITAL SIGNS: BP (!) 169/116   Pulse 96   Temp 37 °C (98.6 °F) (Oral)   Resp 18   Ht 1.575 m (5' 2\")   Wt 49.9 kg (110 lb 0.2 oz)   SpO2 100%   BMI 20.12 kg/m²     General: Alert, mild acute distress, chronically ill in appearance  Skin: Warm, dry, normal for ethnicity  Head: Normocephalic, atraumatic  Neck: Trachea midline, no tenderness  Eye: PERRL, conjunctival pallor noted  ENMT: Oral mucosa moist and pale.  Cardiovascular: S1, S2, mildly tachycardic, otherwise regular rate and rhythm, No murmur, Normal peripheral perfusion  Respiratory: Lungs clear to auscultation bilaterally, symmetric chest rise, speaking in full sentences with no retractions no accessory muscle use.  No rales, no rhonchi, no stridor.  Musculoskeletal: No swelling, no deformity  Neurological: Alert and oriented to person, place, time, and situation  Lymphatics: No lymphadenopathy  Psychiatric: Cooperative, appropriate mood & affect      DIAGNOSTIC STUDIES/PROCEDURES    LABS  Results for orders placed or performed during the hospital encounter of 06/10/22   CBC WITH DIFFERENTIAL   Result Value Ref Range    WBC 7.4 4.8 - 10.8 K/uL    RBC 2.56 (L) 4.20 - 5.40 M/uL    Hemoglobin 6.7 (L) 12.0 - 16.0 g/dL    Hematocrit 23.0 (L) 37.0 - 47.0 %    MCV 89.8 81.4 - " 97.8 fL    MCH 26.2 (L) 27.0 - 33.0 pg    MCHC 29.1 (L) 33.6 - 35.0 g/dL    RDW 66.2 (H) 35.9 - 50.0 fL    Platelet Count 276 164 - 446 K/uL    MPV 9.7 9.0 - 12.9 fL    Neutrophils-Polys 84.60 (H) 44.00 - 72.00 %    Lymphocytes 10.10 (L) 22.00 - 41.00 %    Monocytes 4.10 0.00 - 13.40 %    Eosinophils 0.40 0.00 - 6.90 %    Basophils 0.30 0.00 - 1.80 %    Immature Granulocytes 0.50 0.00 - 0.90 %    Nucleated RBC 0.00 /100 WBC    Neutrophils (Absolute) 6.26 2.00 - 7.15 K/uL    Lymphs (Absolute) 0.75 (L) 1.00 - 4.80 K/uL    Monos (Absolute) 0.30 0.00 - 0.85 K/uL    Eos (Absolute) 0.03 0.00 - 0.51 K/uL    Baso (Absolute) 0.02 0.00 - 0.12 K/uL    Immature Granulocytes (abs) 0.04 0.00 - 0.11 K/uL    NRBC (Absolute) 0.00 K/uL   COMP METABOLIC PANEL   Result Value Ref Range    Sodium 126 (L) 135 - 145 mmol/L    Potassium 6.1 (H) 3.6 - 5.5 mmol/L    Chloride 86 (L) 96 - 112 mmol/L    Co2 27 20 - 33 mmol/L    Anion Gap 13.0 7.0 - 16.0    Glucose 86 65 - 99 mg/dL    Bun 43 (H) 8 - 22 mg/dL    Creatinine 5.33 (HH) 0.50 - 1.40 mg/dL    Calcium 8.6 8.4 - 10.2 mg/dL    AST(SGOT) 20 12 - 45 U/L    ALT(SGPT) 8 2 - 50 U/L    Alkaline Phosphatase 88 30 - 99 U/L    Total Bilirubin 0.3 0.1 - 1.5 mg/dL    Albumin 2.9 (L) 3.2 - 4.9 g/dL    Total Protein 8.4 (H) 6.0 - 8.2 g/dL    Globulin 5.5 (H) 1.9 - 3.5 g/dL    A-G Ratio 0.5 g/dL   MAGNESIUM   Result Value Ref Range    Magnesium 1.4 (L) 1.5 - 2.5 mg/dL   PHOSPHORUS   Result Value Ref Range    Phosphorus 4.9 (H) 2.5 - 4.5 mg/dL   APTT   Result Value Ref Range    APTT 30.9 24.7 - 36.0 sec   PROTHROMBIN TIME (INR)   Result Value Ref Range    PT 14.3 12.0 - 14.6 sec    INR 1.20 (H) 0.87 - 1.13   COD (ADULT)   Result Value Ref Range    ABO Grouping Only O     Rh Grouping Only POS     Antibody Screen-Cod NEG    TROPONIN   Result Value Ref Range    Troponin T 500 (H) 6 - 19 ng/L   CoV-2, FLU A/B, and RSV by PCR (2-4 Hours CEPHEID) : Collect NP swab in VTM    Specimen: Nasopharyngeal; Respirate    Result Value Ref Range    Influenza virus A RNA Negative Negative    Influenza virus B, PCR Negative Negative    RSV, PCR Negative Negative    SARS-CoV-2 by PCR NotDetected     SARS-CoV-2 Source NP Swab    LACTIC ACID   Result Value Ref Range    Lactic Acid 1.8 0.5 - 2.0 mmol/L   ESTIMATED GFR   Result Value Ref Range    GFR (CKD-EPI) 11 (A) >60 mL/min/1.73 m 2   POCT glucose device results   Result Value Ref Range    POC Glucose, Blood 73 65 - 99 mg/dL     All labs reviewed by me, troponin similar to previous baseline, mild hyperphosphatemia, mild hypomagnesemia, significant hyperkalemia appreciated however.    EKG  12 Lead EKG obtained at 0505 and interpreted by me to show:  Rhythm: Normal sinus rhythm   Rate: 85  Axis: Left  Intervals: Normal  Q Waves: Normal  No diagnostic ST segment elevation  Peaked T waves concerning for hyperkalemia  Clinical Impression: Normal EKG  Compared to April 20, 2022, new peaked T waves appreciated    RADIOLOGY  DX-CHEST-PORTABLE (1 VIEW)   Final Result      Stable cardiomegaly        The radiologist's interpretation of all radiological studies have been reviewed by me.    COURSE & MEDICAL DECISION MAKING  Pertinent Labs & Imaging studies reviewed. (See chart for details)    3:58 AM - Patient seen and examined at bedside. Patient will be treated with supplemental oxygen, hydromorphone and Zofran for her discomfort. Ordered cardiac work-up as well as COD, coags, and inflammatory markers as well as chest x-ray to evaluate her symptoms. The differential diagnoses include but are not limited to: Pulmonary edema, symptomatic anemia, viral syndrome    0501: Indeed patient is significantly anemic.  Thankfully x-ray is reassuring demonstrating no pulmonary edema nor pleural effusion.  Given she is symptomatic with regard to the anemia I have ordered transfusion.  Patient has multiple electrolyte abnormalities including hyperkalemia, EKG will be obtained.  Have ordered transfusion of 2  "units of irradiated packed RBCs.    0524: I spoke with the nephrologist Dr. Najjar given EKG abnormality.  He concurs medicating for hyperkalemia indicated, he will consult on the case for dialysis.  I spoke with the hospitalist Dr. Styles who concurs with plan for admission, given she is not tachycardic at this time and not hypotensive no indication for ICU management.  Given hyperkalemia I have ordered calcium gluconate 2 g, insulin and dextrose, as well as sodium bicarbonate.  Transfusion already ordered and pending, patient will be admitted for definitive management including dialysis.    Patient Vitals for the past 24 hrs:   BP Temp Temp src Pulse Resp SpO2 Height Weight   06/10/22 0542 (!) 169/116 -- -- 96 18 100 % -- --   06/10/22 0527 -- -- -- 86 20 100 % -- --   06/10/22 0512 (!) 157/105 -- -- 87 (!) 27 100 % -- --   06/10/22 0457 -- -- -- 84 20 100 % -- --   06/10/22 0442 (!) 181/110 -- -- 87 18 100 % -- --   06/10/22 0328 (!) 154/107 37 °C (98.6 °F) Oral 97 16 100 % 1.575 m (5' 2\") 49.9 kg (110 lb 0.2 oz)           Patient is critically ill.   The patient continues to have: Symptomatic anemia, hyperkalemia with EKG changes, hypoxia requiring increased supplemental oxygen from baseline  The vital organ system that is affected is the: Hematologic, cardiovascular, pulmonary  If untreated there is a high chance of deterioration into: Cardiac arrest, respiratory failure  And eventually death.   The critical care that I am providing today is: Cardiac monitoring with infusion of IV calcium gluconate, insulin/dextrose, sodium bicarbonate, consultation with nephrologist and hospitalist, supplemental oxygen  The critical that has been undertaken is medically complex.   There has been no overlap in critical care time.   Critical Care Time not including procedures: 129 minutes    Decision Making:  This is a 26 y.o. year old female who presents with acute dyspnea and generalized body aches.  She has history of " end-stage renal disease as well as chronic anemia requiring regular transfusions and chronic respiratory insufficiency, as such she is known well to this department.  Patient is more tachycardic and more pale than her last visit.  Lungs are diminished and given she is requiring additional supplemental oxygen certainly volume overload/pulmonary edema must be considered.  We will reassess her hemoglobin as presentation also is concerning for potential symptomatic anemia.  Work-up demonstrates microcytic anemia 1.3 g lower than previous only 5 days ago.  Given she is symptomatic I do feel she would benefit from transfusion of PRBCs.  In addition, she has multiple laboratory abnormalities including significant hyperkalemia, potassium of 6.1 and EKG demonstrates peaked T waves.  As such, she will require intervention, insulin/dextrose, calcium gluconate, sodium bicarb administered in the ED. consulted with nephrologist concurs with plan for admission and need for dialysis and transfusion.    Patient admitted in improved but guarded condition to the care of the hospitalist Dr. Jorge Styles to medical telemetry with Dr. Najjar of nephrology consulting.    FINAL IMPRESSION  1. Symptomatic anemia    2. Acute hyperkalemia    3. ESRD needing dialysis (HCC)    4. Hypophosphatemia    5. Hypomagnesemia    6. Myalgia    7. Chronic hypertension          Allison PARRA M.D. (Gabrielle), am scribing for, and in the presence of, Allison Cormier MD.    Electronically signed by: Allison Cormier M.D. (Gabrielle), 6/10/2022    Allison PARRA MD personally performed the services described in this documentation, as scribed by Allison Cormier M.D. in my presence, and it is both accurate and complete    The note accurately reflects work and decisions made by me.  Allison Cormier M.D.  6/10/2022  5:36 AM

## 2022-06-10 NOTE — ED NOTES
Patient reports pain all over body, declined all oral pain meds except IV dialudid.  Pain 9/10 given 0.25mg of IV dilaudid.

## 2022-06-10 NOTE — PROGRESS NOTES
[facility] Medicine Daily Progress Note    Date of Service  6/10/2022    Chief Complaint  Lily Nicole is a 26 y.o. female admitted 6/10/2022 with   Chief Complaint   Patient presents with   • Shortness of Breath     Pt reports it started tonight. Denies cough of fevers.    • Joint Pain     Pt reports an achy feeling all over that started this morning.      Hospital Course  26-year-old female with a past medical history of end-stage renal disease on HD MWF with aVF, hypertension, lupus, anemia of chronic renal disease, chronic hypoxemic respiratory failure on 3 L nasal cannula at home admitted 6/10/2022 for worsening shortness of breath, increased oxygen to 4 L with no improvement, reportedly in dialysis on Wednesday had problems with her AV fistula.  In the ED patient was found to have a hemoglobin of 6.7, sodium 126, hyperkalemia 6.1, magnesium level 1.4, troponin 500.  Patient was admitted for hyperkalemia and possible AV fistula malfunction.    Interval Problem Update  Patient seen as dialysis was ending. Mother at bedside. Patient stated she has severe joint pains this morning from her lupus.  She was feeling tired from dialysis. Patient stated Wednesday dialysis was stopped too early due to the machine problem.  Patient finished transfusion of blood around 12:15.  Dialysis RN requesting labs be drawn 2 hours from now for accuracy after HD.    I have personally seen and examined the patient at bedside. I discussed the plan of care with patient, family, bedside RN, charge RN,  and pharmacy.    Consultants/Specialty  nephrology    Code Status  Full Code    Disposition  Patient is not medically cleared for discharge.   Anticipate discharge to to home with close outpatient follow-up.  I have placed the appropriate orders for post-discharge needs.    Review of Systems  Review of Systems   Musculoskeletal: Positive for joint pain and myalgias.   All other systems reviewed and are negative.        Physical Exam  Temp:  [35.9 °C (96.6 °F)-37 °C (98.6 °F)] 36.1 °C (97 °F)  Pulse:  [65-97] 65  Resp:  [13-27] 18  BP: (133-181)/() 138/91  SpO2:  [95 %-100 %] 100 %    Physical Exam  Vitals and nursing note reviewed.   Constitutional:       General: She is not in acute distress.     Appearance: She is not ill-appearing.      Comments: Thin appearing young female   HENT:      Head: Normocephalic and atraumatic.   Eyes:      General: No scleral icterus.     Extraocular Movements: Extraocular movements intact.   Cardiovascular:      Rate and Rhythm: Normal rate.      Pulses: Normal pulses.      Heart sounds: Normal heart sounds. No murmur heard.  Pulmonary:      Effort: Pulmonary effort is normal. No respiratory distress.      Breath sounds: Normal breath sounds.   Abdominal:      General: Abdomen is flat. Bowel sounds are normal. There is no distension.      Palpations: Abdomen is soft.   Musculoskeletal:         General: No swelling or tenderness. Normal range of motion.      Cervical back: Normal range of motion and neck supple.   Skin:     General: Skin is warm.      Capillary Refill: Capillary refill takes less than 2 seconds.      Coloration: Skin is not jaundiced.      Findings: No erythema.   Neurological:      General: No focal deficit present.      Mental Status: She is alert and oriented to person, place, and time. Mental status is at baseline.      Motor: Weakness present.   Psychiatric:         Mood and Affect: Mood normal.         Behavior: Behavior normal.         Fluids    Intake/Output Summary (Last 24 hours) at 6/10/2022 6347  Last data filed at 6/10/2022 1253  Gross per 24 hour   Intake 150 ml   Output --   Net 150 ml       Laboratory  Recent Labs     06/10/22  4312   WBC 7.4   RBC 2.56*   HEMOGLOBIN 6.7*   HEMATOCRIT 23.0*   MCV 89.8   MCH 26.2*   MCHC 29.1*   RDW 66.2*   PLATELETCT 276   MPV 9.7     Recent Labs     06/10/22  0412   SODIUM 126*   POTASSIUM 6.1*   CHLORIDE 86*   CO2 27    GLUCOSE 86   BUN 43*   CREATININE 5.33*   CALCIUM 8.6     Recent Labs     06/10/22  0415   APTT 30.9   INR 1.20*               Imaging  DX-CHEST-PORTABLE (1 VIEW)   Final Result      Stable cardiomegaly           Assessment/Plan  Agree with HPI plan. HD completed. Repeating blood work, concern patient may need further transfusion. Pending Nephrology decision if needing repeat HD on this admission given hyperkalemia. Patient's troponin has elevated, more than even baseline, now 696. She will need ongoing telemetry monitoring, vitals monitoring, repeat troponins, monitor for likely demand ischemia as patient could not complete her dialysis session on Wednesday. She will need ongoing monitoring for potassium levels and magnesium levels.  If patient requires further transfusions, electrolyte replacements, control of hyperkalemia and troponinemia management, repeat hemodialysis, I for see patient staying beyond 2 midnights.    * Hyperkalemia- (present on admission)  Assessment & Plan  - Due to incomplete dialysis session on Wednesday  -Patient does have peaked T waves on EKG  -Patient denies any chest pain or palpitations  -Patient has been given dextrose and IV insulin as well as calcium by ERP  -Monitor with telemetry  -Hemodialysis is being set up by nephrology, if there is a delay could consider repeating potassium level but at this point in time feel comfortable just monitoring with telemetry  -I do feel the patient needs to be admitted at this time for treatment of her hyperkalemia, if she was sent home I would anticipate this would continue to worsen, potentially rapidly until she receives dialysis, could result in catastrophic arrhythmia  -Since patient is a chronic hemodialysis patient, this can be done relatively soon and patient can have a blood transfusion so I do not anticipate her stay to be more than 2 midnights    Hypomagnesemia- (present on admission)  Assessment & Plan  - Give IV magnesium    Chronic  respiratory failure with hypoxia (HCC)- (present on admission)  Assessment & Plan  - With acute dyspnea which I think is due to worsening anemia, chest x-ray did not show any significant pulmonary edema  -Home oxygen is 3 L, good sats on 3 L currently    Gastroesophageal reflux disease without esophagitis- (present on admission)  Assessment & Plan  -Continue home PPI    Anemia due to chronic kidney disease- (present on admission)  Assessment & Plan  - Acute on chronic  -ERP has ordered transfusion  -Obtain hemoglobin post completion, transfuse to keep hemoglobin greater than 7  -No sign of gross bleeding    Hyponatremia- (present on admission)  Assessment & Plan  - Acute on chronic due to incomplete hemodialysis session resulting in excess fluid  -Repeat BMP in the morning    Elevated troponin- (present on admission)  Assessment & Plan  - Chronic, no worsening from her baseline  -Denies any chest pain  -I will repeat a troponin later this morning to make sure its not worsening    Lupus (HCC)- (present on admission)  Assessment & Plan  - Chronic, continue home meds    HTN (hypertension)- (present on admission)  Assessment & Plan  - Continue home amlodipine, atenolol, clonidine  -Start as needed hydralazine  -Adjust as needed    ESRD (end stage renal disease) on dialysis (HCC)- (present on admission)  Assessment & Plan  - Chronic, does get dialysis Monday, Wednesday Friday  -Technical difficulties with dialysis on Wednesday so it was not a full session  -Now with hyperkalemia  -ERP did discuss the case with nephrology who is planning hemodialysis         VTE prophylaxis: SCDs/TEDs    I have performed a physical exam and reviewed and updated ROS and Plan today (6/10/2022). In review of yesterday's note (6/9/2022), there are no changes except as documented above.

## 2022-06-10 NOTE — ASSESSMENT & PLAN NOTE
- Acute on chronic due to incomplete hemodialysis session resulting in excess fluid  -Repeat BMP in the morning

## 2022-06-10 NOTE — ASSESSMENT & PLAN NOTE
- Chronic, no worsening from her baseline  -Denies any chest pain  -I will repeat a troponin later this morning to make sure its not worsening

## 2022-06-10 NOTE — ED NOTES
This RN spoke with blood bank, dialysis RN Eva, and hospitalist MD Vega. Per blood bank, we are still awaiting blood from [facility]. [facility] will notify blood bank when  is en route to [facility]. MD Vega states dialysis should begin ASAP without waiting for blood to arrive. MICHELLE Velasquez confirmed she will send dialysis RN to patient's ED after they are finished with current dialysis session. Blood transfusion will then need to proceed when blood has arrived from [facility]. Patient updated on plan of care.

## 2022-06-10 NOTE — DISCHARGE PLANNING
Met with pt and mom. Pt was crying because of generalized pain but RN was in room already to give her IV dose of pain medication.     Talked to mom . Pt can walk but usually uses a wheelchair for longer distances. Pt needs assistance with shower and dressing. Pt does not drive so mom takes her to dialysis MWF at [facility] usual time is 0530. Pt unable to perform cooking,laundry and shopping. Pt also has siblings at home that can help her.     Pt calmed down after pain medication was given.     Information on facesheet has been verified . PCP is Dr. Ella Matthew. Pharmacy is [facility] on [address].     Care Transition Team Assessment    Information Source  Orientation Level: Oriented X4  Information Given By: Patient, Parent  Informant's Name: Chikis Altman  Who is responsible for making decisions for patient? : Patient    Readmission Evaluation  Is this a readmission?: No    Elopement Risk  Legal Hold: No    Interdisciplinary Discharge Planning  Does Admitting Nurse Feel This Could be a Complex Discharge?: No  Primary Care Physician: Dr Ella Matthew  Lives with - Patient's Self Care Capacity: Parents  Support Systems: Family Member(s), Parent  Housing / Facility: [address]  Do You Take your Prescribed Medications Regularly: Yes  Able to Return to Previous ADL's: Yes  Mobility Issues: Yes  Prior Services: Other (Comments) (HD at [facility])  Patient Prefers to be Discharged to:: Home  Assistance Needed: Yes  Durable Medical Equipment: Other - Specify (wheelchair)    Discharge Preparedness  What is your plan after discharge?: Home with help  What are your discharge supports?: Parent, Sibling  Prior Functional Level: Ambulatory, Needs Assist with Activities of Daily Living, Needs Assist with Medication Management, Uses Wheelchair  Difficulity with ADLs: Bathing, Dressing  Difficulity with IADLs: Cooking, Driving, Laundry, Shopping    Functional Assesment  Prior Functional Level: Ambulatory, Needs Assist  with Activities of Daily Living, Needs Assist with Medication Management, Uses Wheelchair    Finances  Financial Barriers to Discharge: No  Prescription Coverage: Yes    Vision / Hearing Impairment  Vision Impairment : Yes  Hearing Impairment : No    Values / Beliefs / Concerns  Values / Beliefs Concerns : No    Advance Directive  Advance Directive?: None    Domestic Abuse  Have you ever been the victim of abuse or violence?: No    Discharge Risks or Barriers  Discharge risks or barriers?: No  Patient risk factors: Other (comment)    Anticipated Discharge Information  Discharge Disposition: Discharged to home/self care (01)

## 2022-06-10 NOTE — CARE PLAN
The patient is Stable - Low risk of patient condition declining or worsening    Shift Goals  Clinical Goals: to feel better  Patient Goals: to breath better and get some rest  Family Goals: n/a    Progress made toward(s) clinical / shift goals:    Problem: Pain - Standard  Goal: Alleviation of pain or a reduction in pain to the patient’s comfort goal  Outcome: Progressing     Problem: Knowledge Deficit - Standard  Goal: Patient and family/care givers will demonstrate understanding of plan of care, disease process/condition, diagnostic tests and medications  Outcome: Progressing       Patient is not progressing towards the following goals:

## 2022-06-10 NOTE — ASSESSMENT & PLAN NOTE
- Due to incomplete dialysis session on Wednesday  -Patient does have peaked T waves on EKG  -Patient denies any chest pain or palpitations  -Patient has been given dextrose and IV insulin as well as calcium by ERP  -Monitor with telemetry  -Hemodialysis is being set up by nephrology, if there is a delay could consider repeating potassium level but at this point in time feel comfortable just monitoring with telemetry  -I do feel the patient needs to be admitted at this time for treatment of her hyperkalemia, if she was sent home I would anticipate this would continue to worsen, potentially rapidly until she receives dialysis, could result in catastrophic arrhythmia  -Since patient is a chronic hemodialysis patient, this can be done relatively soon and patient can have a blood transfusion so I do not anticipate her stay to be more than 2 midnights

## 2022-06-10 NOTE — PROGRESS NOTES
4 Eyes Skin Assessment Completed by MICHELLE Alcazar and MICHELLE Hicks.    Head WDL  Ears WDL  Nose WDL  Mouth WDL  Neck WDL  Breast/Chest WDL  Shoulder Blades WDL  Spine WDL  (R) Arm/Elbow/Hand WDL AV Fistula present in upper R arm  (L) Arm/Elbow/Hand WDL  Abdomen WDL  Groin WDL  Scrotum/Coccyx/Buttocks WDL  (R) Leg WDL  (L) Leg WDL  (R) Heel/Foot/Toe WDL  (L) Heel/Foot/Toe WDL          Devices In Places ECG, Tele Box, Blood Pressure Cuff and Pulse Ox      Interventions In Place Pressure Redistribution Mattress    Possible Skin Injury No    Pictures Uploaded Into Epic N/A  Wound Consult Placed N/A  RN Wound Prevention Protocol Ordered No

## 2022-06-10 NOTE — PROGRESS NOTES
HD treatment today ordered by Dr Najjar using RUAAVF.Treatment tolerated well.Transfused 1 unit prbc with treatment with no adverse reaction.Patient slept throughout.Net UF removed 3000 ml.Report given to primary Rn.

## 2022-06-10 NOTE — H&P
Hospital Medicine History & Physical Note    Date of Service  6/10/2022    Primary Care Physician  lEla Matthew M.D.    Consultants  nephrology    Specialist Names: Dr Najjar     Code Status  Full Code    Chief Complaint  Chief Complaint   Patient presents with   • Shortness of Breath     Pt reports it started tonight. Denies cough of fevers.    • Joint Pain     Pt reports an achy feeling all over that started this morning.        History of Presenting Illness  Lily Nicole is a 26 y.o. female who presented 6/10/2022 with shortness of breath.  Patient states she became slightly short of breath whenever she went to bed last night, awoke this morning more short of breath.  She did try to increase her baseline oxygen from 3 L to 4 L with no improvement.  She also complained of some generalized body aches.  She does require hemodialysis Monday, Wednesday and Friday.  She states there is no issues on Monday with her dialysis however Wednesday they had an issue returning her blood as well as pulling off fluid.  Patient also has a history of chronic anemia, upon arrival hemoglobin was 6.7.  Patient does have history of lupus.  I did discuss the case including labs and imaging with the ER physician.    I discussed the plan of care with patient.    Review of Systems  Review of Systems   Constitutional: Negative for chills, fever and malaise/fatigue.   HENT: Negative for congestion.    Respiratory: Positive for shortness of breath. Negative for cough, sputum production and stridor.    Cardiovascular: Negative for chest pain, palpitations and leg swelling.   Gastrointestinal: Negative for abdominal pain, constipation, diarrhea, nausea and vomiting.   Genitourinary: Negative for dysuria and urgency.   Musculoskeletal: Positive for myalgias. Negative for falls.   Neurological: Negative for dizziness, tingling, loss of consciousness, weakness and headaches.   Psychiatric/Behavioral: Negative for depression and  suicidal ideas.   All other systems reviewed and are negative.      Past Medical History   has a past medical history of Anemia (01/17/2018), AVF (arteriovenous fistula) ([facility]), Chest pain, Chest tightness, Daytime sleepiness, Dialysis patient ([facility]), Difficulty breathing, ESRD (end stage renal disease) on dialysis ([facility]) (01/17/2018), Gasping for breath, Heart burn, Hypertension (01/17/2018), Indigestion, Lupus ([facility]), Migraines (01/17/2018), Painful breathing, Palpitations, Seizure ([facility]) (2013), Shortness of breath, Swelling of lower extremity, and Wheezing.    Surgical History   has a past surgical history that includes ronak by laparoscopy (4/5/2010); av fistula creation (Right); angioplasty (01/17/2018); other; other; gastroscopy-endo (12/9/2019); gastroscopy (N/A, 5/30/2020); gastroscopy-endo (9/18/2020); pr upper gi endoscopy,ctrl bleed (11/12/2020); pr upper gi endoscopy,biopsy (11/12/2020); gastroscopy-endo (11/12/2020); pr colonoscopy,diagnostic (1/8/2021); pr upper gi endoscopy,diagnosis (1/8/2021); pr upper gi endoscopy,ctrl bleed (1/8/2021); pr upper gi endoscopy,diagnosis (3/5/2021); pr upper gi endoscopy,diagnosis (N/A, 3/19/2021); pr upper gi endoscopy,ctrl bleed (3/19/2021); and pr bronchoscopy,diagnostic (Bilateral, 5/13/2021).     Family History  family history includes Diabetes in her paternal grandmother.   Family history reviewed with patient. There is no family history that is pertinent to the chief complaint.     Social History   reports that she has never smoked. She has never used smokeless tobacco. She reports that she does not drink alcohol and does not use drugs.    Allergies  Allergies   Allergen Reactions   • Cephalexin Rash     Nausea and rash   Hives  .   • Clindamycin Rash     Nausea and rash     Hive  .   • Methylprednisolone      Anxious  Other reaction(s): Other-Reaction in Comments  Anxious   • Metoprolol Rash and Nausea     Nausea and rash     .   • Compazine      C/o anxiety   •  Fentanyl And Related Anxiety   • Hydrocodone-Acetaminophen Rash and Nausea     Generalized rash  Generalized rash   • Maxipime [Cefepime] Itching   • Reglan [Metoclopramide] Anxiety   • Tape Rash     Paper tape is ok       Medications  Prior to Admission Medications   Prescriptions Last Dose Informant Patient Reported? Taking?   HYDROmorphone (DILAUDID) 2 MG Tab   Yes No   acetaminophen (TYLENOL) 500 MG Tab  Patient Yes No   Sig: Take 500-1,000 mg by mouth every 6 hours as needed. Indications: Pain   amLODIPine (NORVASC) 5 MG Tab  Patient Yes No   Sig: Take 5 mg by mouth every day.   atenolol (TENORMIN) 25 MG Tab  Patient Yes No   Sig: Take 25 mg by mouth every day.   cloNIDine (CATAPRES) 0.2 MG/24HR PATCH WEEKLY patch  Patient Yes No   Sig: Place 1 Patch on the skin every Friday.   cloNIDine (CATAPRES) 0.3 MG Tab   Yes No   Sig: Take 0.3 mg by mouth 2 times a day.   hydroxychloroquine (PLAQUENIL) 200 MG Tab  Patient Yes No   Sig: Take 200 mg by mouth every day.   mycophenolate sodium (MYFORTIC) 360 MG Tablet Delayed Response tablet  Patient Yes No   Sig: Take 360 mg by mouth every evening.   ondansetron (ZOFRAN ODT) 4 MG TABLET DISPERSIBLE  Patient Yes No   Sig: Take 4 mg by mouth every 6 hours as needed for Nausea.   Patient not taking: Reported on 6/2/2022   ondansetron (ZOFRAN) 4 MG Tab tablet   Yes No   pantoprazole (PROTONIX) 40 MG Tablet Delayed Response  Patient No No   Sig: Take 1 tablet by mouth 2 times a day.   predniSONE (DELTASONE) 5 MG Tab  Patient Yes No   Sig: Take 5 mg by mouth every evening.      Facility-Administered Medications: None       Physical Exam  Temp:  [37 °C (98.6 °F)] 37 °C (98.6 °F)  Pulse:  [84-97] 96  Resp:  [16-27] 18  BP: (154-181)/(105-116) 169/116  SpO2:  [100 %] 100 %  Blood Pressure: (!) 169/116   Temperature: 37 °C (98.6 °F)   Pulse: 96   Respiration: 18   Pulse Oximetry: 100 %       Physical Exam  Vitals and nursing note reviewed.   Constitutional:       General: She is not  in acute distress.     Appearance: She is well-developed. She is ill-appearing. She is not diaphoretic.   HENT:      Head: Normocephalic and atraumatic.      Right Ear: External ear normal.      Left Ear: External ear normal.      Nose: Nose normal. No congestion or rhinorrhea.      Mouth/Throat:      Mouth: Mucous membranes are moist.      Pharynx: No oropharyngeal exudate.   Eyes:      General:         Right eye: No discharge.         Left eye: No discharge.   Neck:      Trachea: No tracheal deviation.   Cardiovascular:      Rate and Rhythm: Normal rate and regular rhythm.      Heart sounds: Murmur heard.   Pulmonary:      Effort: Pulmonary effort is normal. No respiratory distress.      Breath sounds: No wheezing or rales.   Chest:      Chest wall: No tenderness.   Abdominal:      General: Bowel sounds are normal. There is no distension.      Palpations: Abdomen is soft.      Tenderness: There is no abdominal tenderness.   Musculoskeletal:         General: No tenderness. Normal range of motion.      Cervical back: Neck supple.      Right lower leg: No edema.      Left lower leg: No edema.   Lymphadenopathy:      Cervical: No cervical adenopathy.   Skin:     General: Skin is warm and dry.      Findings: No erythema or rash.   Neurological:      Mental Status: She is alert and oriented to person, place, and time.   Psychiatric:         Mood and Affect: Mood normal.         Behavior: Behavior normal.         Thought Content: Thought content normal.         Judgment: Judgment normal.         Laboratory:  Recent Labs     06/10/22  0415   WBC 7.4   RBC 2.56*   HEMOGLOBIN 6.7*   HEMATOCRIT 23.0*   MCV 89.8   MCH 26.2*   MCHC 29.1*   RDW 66.2*   PLATELETCT 276   MPV 9.7     Recent Labs     06/10/22  0415   SODIUM 126*   POTASSIUM 6.1*   CHLORIDE 86*   CO2 27   GLUCOSE 86   BUN 43*   CREATININE 5.33*   CALCIUM 8.6     Recent Labs     06/10/22  0415   ALTSGPT 8   ASTSGOT 20   ALKPHOSPHAT 88   TBILIRUBIN 0.3   GLUCOSE 86      Recent Labs     06/10/22  0415   APTT 30.9   INR 1.20*     No results for input(s): NTPROBNP in the last 72 hours.      Recent Labs     06/10/22  0415   TROPONINT 500*       Imaging:  DX-CHEST-PORTABLE (1 VIEW)   Final Result      Stable cardiomegaly          EKG:  I have personally reviewed the images and compared with prior images.    Assessment/Plan:  Justification for Admission Status  I anticipate this patient is appropriate for observation status at this time because Hyperkalemia with need for hemodialysis as well as worsening anemia    * Hyperkalemia- (present on admission)  Assessment & Plan  - Due to incomplete dialysis session on Wednesday  -Patient does have peaked T waves on EKG  -Patient denies any chest pain or palpitations  -Patient has been given dextrose and IV insulin as well as calcium by ERP  -Monitor with telemetry  -Hemodialysis is being set up by nephrology, if there is a delay could consider repeating potassium level but at this point in time feel comfortable just monitoring with telemetry  -I do feel the patient needs to be admitted at this time for treatment of her hyperkalemia, if she was sent home I would anticipate this would continue to worsen, potentially rapidly until she receives dialysis, could result in catastrophic arrhythmia  -Since patient is a chronic hemodialysis patient, this can be done relatively soon and patient can have a blood transfusion so I do not anticipate her stay to be more than 2 midnights    Anemia due to chronic kidney disease- (present on admission)  Assessment & Plan  - Acute on chronic  -ERP has ordered transfusion  -Obtain hemoglobin post completion, transfuse to keep hemoglobin greater than 7  -No sign of gross bleeding    ESRD (end stage renal disease) on dialysis (HCC)- (present on admission)  Assessment & Plan  - Chronic, does get dialysis Monday, Wednesday Friday  -Technical difficulties with dialysis on Wednesday so it was not a full session  -Now  with hyperkalemia  -ERP did discuss the case with nephrology who is planning hemodialysis    Hypomagnesemia- (present on admission)  Assessment & Plan  - Give IV magnesium    Chronic respiratory failure with hypoxia (HCC)- (present on admission)  Assessment & Plan  - With acute dyspnea which I think is due to worsening anemia, chest x-ray did not show any significant pulmonary edema  -Home oxygen is 3 L, good sats on 3 L currently    Gastroesophageal reflux disease without esophagitis- (present on admission)  Assessment & Plan  -Continue home PPI    Hyponatremia- (present on admission)  Assessment & Plan  - Acute on chronic due to incomplete hemodialysis session resulting in excess fluid  -Repeat BMP in the morning    Elevated troponin- (present on admission)  Assessment & Plan  - Chronic, no worsening from her baseline  -Denies any chest pain  -I will repeat a troponin later this morning to make sure its not worsening    Lupus (HCC)- (present on admission)  Assessment & Plan  - Chronic, continue home meds    HTN (hypertension)- (present on admission)  Assessment & Plan  - Continue home amlodipine, atenolol, clonidine  -Start as needed hydralazine  -Adjust as needed      VTE prophylaxis: SCDs/TEDs

## 2022-06-10 NOTE — ED NOTES
Dialysis RN, Sidney, arrived at bedside for treatment. Patient moved from ED room 3 to ED 12 for dialysis.

## 2022-06-10 NOTE — ASSESSMENT & PLAN NOTE
- With acute dyspnea which I think is due to worsening anemia, chest x-ray did not show any significant pulmonary edema  -Home oxygen is 3 L, good sats on 3 L currently

## 2022-06-10 NOTE — CONSULTS
DATE OF SERVICE:  06/10/2022     REQUESTING PHYSICIAN:  Allison Cormier MD from the emergency room   service.     REASON FOR CONSULTATION:  Management of respiratory failure in the setting of   end-stage renal disease, assess need for urgent dialysis.     Unfortunately, the patient is sedated by the time I see, so she is a very poor   historian.  Most of the story was reviewing record, discussing the case with   the ER physician.     HISTORY OF PRESENT ILLNESS:  Briefly, the patient is a 26-year-old lady who is   very well known to our service.  She has a history of end-stage renal   disease, undergoes hemodialysis on Monday, Wednesday, Friday.  Apparently, the   patient did not have a full dialysis treatment on Wednesday 06/08.  She   presented to the hospital today with shortness of breath and joint pain.  She   was found to be in respiratory failure, also severe anemia.  The patient has   been admitted.  She has been getting blood transfusion.  We were called to   manage her respiratory failure, assess need for urgent dialysis.     The patient did receive some pain medication and allergy medication and she is   sedated at the moment.     Past medical history, social history, family history, medications, review of   systems as well as allergies are unobtainable.  Please refer to the chart.     PHYSICAL EXAMINATION:  GENERAL:  The patient is sleeping restfully.  VITAL SIGNS:  Showed blood pressure of 141/94, heart rate was 76, respiratory   rate was 18.  HEENT:  Normocephalic, atraumatic.  Sclerae are anicteric.  Pupils are   reactive.  Nose normal.  Mucous membranes moist.  NECK:  No lymphadenopathy, no JVD, no thyroid mass.  CHEST:  Normal.  LUNGS:  Scattered rhonchi.  HEART:  S1, S2.  ABDOMEN:  Soft, nontender.  No hepatosplenomegaly.  There is no inguinal   lymphadenopathy.  EXTREMITIES:  There is trace lower extremity edema.  SKIN:  No skin rash.  NEUROLOGIC:  The patient is sedated.     LABORATORY  DATA:  Her recent labs from today were reviewed.     DIAGNOSTIC: She also had a chest x-ray, which I reviewed the image myself,   showed cardiomegaly.     ASSESSMENT:   1.  End-stage renal disease.  2.  Respiratory failure.  3.  Severe anemia.  4.  Hyperkalemia.  5.  Uncontrolled hypertension.     PLAN:  1.  We will plan urgent dialysis to manage respiratory failure and   hyperkalemia.  2.  Evaluate daily for the need of dialysis.  3.  Erythropoietin with dialysis.  4.  Renal diet.  5.  Renal dose all medications.  6.  Prognosis is guarded.     Plan discussed in detail with the ER doctor.        ______________________________  FADI NAJJAR, MD FN/AYANNA/ALEJA    DD:  06/10/2022 12:42  DT:  06/10/2022 15:40    Job#:  [id number]

## 2022-06-10 NOTE — ED NOTES
"Patient stated she felt nauseous and vomited small amount of bile approx 5 min after swallowing PO Dilaudid tablet. \"I just started feeling nauseous right after I took the pill.\" PRN IV Zofran administered. Emesis bag inspected for tablet, none seen.   "

## 2022-06-10 NOTE — HOSPITAL COURSE
26-year-old female with a past medical history of end-stage renal disease on HD MWF with aVF, hypertension, lupus, anemia of chronic renal disease, chronic hypoxemic respiratory failure on 3 L nasal cannula at home admitted 6/10/2022 for worsening shortness of breath, increased oxygen to 4 L with no improvement, reportedly in dialysis on Wednesday had problems with her AV fistula.  In the ED patient was found to have a hemoglobin of 6.7, sodium 126, hyperkalemia 6.1, magnesium level 1.4, troponin 500.  Patient was admitted for hyperkalemia and possible AV fistula malfunction.

## 2022-06-10 NOTE — DISCHARGE PLANNING
Outpatient Dialysis Note    Confirmed patient is active at:    [facility]    [address] [place] [id number]    Schedule: Monday, Wednesday, Friday   Time: 6:00am     Patient is seen by Dr. Trent in HD clinic.    Spoke with Sorin at facility who confirmed.    Forwarded records for review.    Mary Han- Senior Dialysis Coordinator # [phone]  [facility] [employer]

## 2022-06-10 NOTE — ASSESSMENT & PLAN NOTE
- Acute on chronic  -ERP has ordered transfusion  -Obtain hemoglobin post completion, transfuse to keep hemoglobin greater than 7  -No sign of gross bleeding

## 2022-06-11 VITALS
WEIGHT: 114.64 LBS | TEMPERATURE: 98.2 F | HEIGHT: 62 IN | BODY MASS INDEX: 21.1 KG/M2 | SYSTOLIC BLOOD PRESSURE: 134 MMHG | DIASTOLIC BLOOD PRESSURE: 80 MMHG | OXYGEN SATURATION: 100 % | HEART RATE: 80 BPM | RESPIRATION RATE: 19 BRPM

## 2022-06-11 PROBLEM — R79.89 ELEVATED TROPONIN: Chronic | Status: ACTIVE | Noted: 2020-01-26

## 2022-06-11 PROBLEM — E87.5 HYPERKALEMIA: Status: RESOLVED | Noted: 2020-01-26 | Resolved: 2022-06-11

## 2022-06-11 PROBLEM — E83.42 HYPOMAGNESEMIA: Status: RESOLVED | Noted: 2022-06-10 | Resolved: 2022-06-11

## 2022-06-11 PROBLEM — E87.1 HYPONATREMIA: Status: RESOLVED | Noted: 2020-01-26 | Resolved: 2022-06-11

## 2022-06-11 LAB
ANION GAP SERPL CALC-SCNC: 11 MMOL/L (ref 7–16)
BUN SERPL-MCNC: 24 MG/DL (ref 8–22)
CALCIUM SERPL-MCNC: 8.6 MG/DL (ref 8.4–10.2)
CHLORIDE SERPL-SCNC: 94 MMOL/L (ref 96–112)
CK SERPL-CCNC: 44 U/L (ref 0–154)
CO2 SERPL-SCNC: 28 MMOL/L (ref 20–33)
CREAT SERPL-MCNC: 3.71 MG/DL (ref 0.5–1.4)
ERYTHROCYTE [DISTWIDTH] IN BLOOD BY AUTOMATED COUNT: 66.2 FL (ref 35.9–50)
GFR SERPLBLD CREATININE-BSD FMLA CKD-EPI: 17 ML/MIN/1.73 M 2
GLUCOSE SERPL-MCNC: 86 MG/DL (ref 65–99)
HCT VFR BLD AUTO: 26.7 % (ref 37–47)
HGB BLD-MCNC: 7.8 G/DL (ref 12–16)
MAGNESIUM SERPL-MCNC: 2.2 MG/DL (ref 1.5–2.5)
MCH RBC QN AUTO: 26.7 PG (ref 27–33)
MCHC RBC AUTO-ENTMCNC: 29.2 G/DL (ref 33.6–35)
MCV RBC AUTO: 91.4 FL (ref 81.4–97.8)
NUCLEAR IGG SER QL IA: DETECTED
PLATELET # BLD AUTO: 226 K/UL (ref 164–446)
PMV BLD AUTO: 9.2 FL (ref 9–12.9)
POTASSIUM SERPL-SCNC: 5.2 MMOL/L (ref 3.6–5.5)
RBC # BLD AUTO: 2.92 M/UL (ref 4.2–5.4)
SODIUM SERPL-SCNC: 133 MMOL/L (ref 135–145)
TROPONIN T SERPL-MCNC: 790 NG/L (ref 6–19)
TROPONIN T SERPL-MCNC: 823 NG/L (ref 6–19)
WBC # BLD AUTO: 3.9 K/UL (ref 4.8–10.8)

## 2022-06-11 PROCEDURE — 700102 HCHG RX REV CODE 250 W/ 637 OVERRIDE(OP): Performed by: INTERNAL MEDICINE

## 2022-06-11 PROCEDURE — 700111 HCHG RX REV CODE 636 W/ 250 OVERRIDE (IP): Performed by: INTERNAL MEDICINE

## 2022-06-11 PROCEDURE — A9270 NON-COVERED ITEM OR SERVICE: HCPCS | Performed by: INTERNAL MEDICINE

## 2022-06-11 PROCEDURE — 82550 ASSAY OF CK (CPK): CPT

## 2022-06-11 PROCEDURE — 99232 SBSQ HOSP IP/OBS MODERATE 35: CPT | Performed by: INTERNAL MEDICINE

## 2022-06-11 PROCEDURE — 84484 ASSAY OF TROPONIN QUANT: CPT

## 2022-06-11 PROCEDURE — 80048 BASIC METABOLIC PNL TOTAL CA: CPT

## 2022-06-11 PROCEDURE — 99239 HOSP IP/OBS DSCHRG MGMT >30: CPT | Performed by: INTERNAL MEDICINE

## 2022-06-11 PROCEDURE — 99204 OFFICE O/P NEW MOD 45 MIN: CPT | Performed by: INTERNAL MEDICINE

## 2022-06-11 PROCEDURE — 85027 COMPLETE CBC AUTOMATED: CPT

## 2022-06-11 PROCEDURE — 83735 ASSAY OF MAGNESIUM: CPT

## 2022-06-11 RX ORDER — HYDROMORPHONE HYDROCHLORIDE 1 MG/ML
1 INJECTION, SOLUTION INTRAMUSCULAR; INTRAVENOUS; SUBCUTANEOUS ONCE
Status: COMPLETED | OUTPATIENT
Start: 2022-06-11 | End: 2022-06-11

## 2022-06-11 RX ADMIN — HYDROMORPHONE HYDROCHLORIDE 1 MG: 1 INJECTION, SOLUTION INTRAMUSCULAR; INTRAVENOUS; SUBCUTANEOUS at 04:08

## 2022-06-11 RX ADMIN — HYDROMORPHONE HYDROCHLORIDE 2 MG: 2 TABLET ORAL at 11:39

## 2022-06-11 RX ADMIN — ONDANSETRON 4 MG: 2 INJECTION INTRAMUSCULAR; INTRAVENOUS at 11:39

## 2022-06-11 RX ADMIN — ONDANSETRON 4 MG: 2 INJECTION INTRAMUSCULAR; INTRAVENOUS at 02:40

## 2022-06-11 RX ADMIN — SENNOSIDES AND DOCUSATE SODIUM 2 TABLET: 50; 8.6 TABLET ORAL at 05:23

## 2022-06-11 RX ADMIN — CLONIDINE HYDROCHLORIDE 0.3 MG: 0.1 TABLET ORAL at 05:23

## 2022-06-11 RX ADMIN — ATENOLOL 25 MG: 25 TABLET ORAL at 05:23

## 2022-06-11 RX ADMIN — HYDROMORPHONE HYDROCHLORIDE 2 MG: 2 TABLET ORAL at 02:40

## 2022-06-11 RX ADMIN — LORAZEPAM 0.5 MG: 2 INJECTION INTRAMUSCULAR; INTRAVENOUS at 12:48

## 2022-06-11 RX ADMIN — HYDROMORPHONE HYDROCHLORIDE 1 MG: 1 INJECTION, SOLUTION INTRAMUSCULAR; INTRAVENOUS; SUBCUTANEOUS at 13:20

## 2022-06-11 RX ADMIN — OMEPRAZOLE 20 MG: 20 CAPSULE, DELAYED RELEASE ORAL at 05:23

## 2022-06-11 ASSESSMENT — PAIN DESCRIPTION - PAIN TYPE
TYPE: ACUTE PAIN;CHRONIC PAIN
TYPE: ACUTE PAIN;CHRONIC PAIN

## 2022-06-11 ASSESSMENT — ENCOUNTER SYMPTOMS
NAUSEA: 0
VOMITING: 0
SHORTNESS OF BREATH: 0
CHILLS: 0
COUGH: 0
FEVER: 0

## 2022-06-11 ASSESSMENT — FIBROSIS 4 INDEX: FIB4 SCORE: 0.75

## 2022-06-11 NOTE — PROGRESS NOTES
Pt states she feels she is having a lupus flare up thus requiring higher doses of pain meds. Dr. Ríos made aware.

## 2022-06-11 NOTE — CONSULTS
"Cardiology Initial Consult Note    DOS: 6/11/2022    Referring physician: Dr Vega    Chief complaint/Reason for consult: Abnormal labs    HPI: 25 y/o F admitted with symptoms of lupus flare including SOB and joint pain after not completing full dialysis treatment on 6/8. She has known SLE and ESRD. On admission troponin was ordered which was elevated. Repeat testing remains elevated. Patient denies chest pain.     ROS (+ highlighted in bold):  Constitutional: Fevers/chills/fatigue/weightloss  HEENT: Blurry vision/eye pain/sore throat/hearing loss  Respiratory: Shortness of breath/cough  Cardiovascular: Chest pain/palpitations/edema/orthopnea/syncope  GI: Nausea/vomitting/diarrhea  MSK: Arthralgias/myagias/muscle weakness  Skin: Rash/sores  Neurological: Numbness/tremors/vertigo  Endocrine: Excessive thirst/polyuria/cold intolerance/heat intolerance  Psych: Depression/anxiety    Past Medical History:   Diagnosis Date   • Anemia 01/17/2018   • AVF (arteriovenous fistula) ([facility])     Right Arm   • Chest pain    • Chest tightness    • Daytime sleepiness    • Dialysis patient ([facility])      dialysis, M,W,F Elizabeth/Joni   • Difficulty breathing    • ESRD (end stage renal disease) on dialysis ([facility]) 01/17/2018    Twan Fu   • Gasping for breath    • Heart burn    • Hypertension 01/17/2018    \"Controlled with medication\"   • Indigestion    • Lupus ([facility])    • Migraines 01/17/2018   • Painful breathing    • Palpitations    • Seizure ([facility]) 2013    from high blood pressure, reports 1 time event   • Shortness of breath    • Swelling of lower extremity    • Wheezing        Past Surgical History:   Procedure Laterality Date   • ID BRONCHOSCOPY,DIAGNOSTIC Bilateral 5/13/2021    Procedure: BRONCHOSCOPY, BRONCHOALVEOLAR LAVAGE;  Surgeon: William Spangler M.D.;  Location: [facility];  Service: Pulmonary   • ID UPPER GI ENDOSCOPY,DIAGNOSIS N/A 3/19/2021    Procedure: GASTROSCOPY;  Surgeon: Waylon Mcqueen M.D.;  Location: " [facility];  Service: Gastroenterology   • TX UPPER GI ENDOSCOPY,CTRL BLEED  3/19/2021    Procedure: GASTROSCOPY, WITH ARGON PLASMA COAGULATION;  Surgeon: Waylon Mcqueen M.D.;  Location: [facility];  Service: Gastroenterology   • TX UPPER GI ENDOSCOPY,DIAGNOSIS  3/5/2021    Procedure: GASTROSCOPY - W/HEMOSTASIS;  Surgeon: Ej Silva M.D.;  Location: [facility];  Service: Gastroenterology   • TX COLONOSCOPY,DIAGNOSTIC  1/8/2021    Procedure: COLONOSCOPY;  Surgeon: Herbert Contreras M.D.;  Location: [facility];  Service: Gastroenterology   • TX UPPER GI ENDOSCOPY,DIAGNOSIS  1/8/2021    Procedure: GASTROSCOPY;  Surgeon: Herbert Contreras M.D.;  Location: [facility];  Service: Gastroenterology   • TX UPPER GI ENDOSCOPY,CTRL BLEED  1/8/2021    Procedure: GASTROSCOPY, WITH ARGON PLASMA COAGULATION;  Surgeon: Herbert Contreras M.D.;  Location: [facility];  Service: Gastroenterology   • TX UPPER GI ENDOSCOPY,CTRL BLEED  11/12/2020    Procedure: GASTROSCOPY, WITH ARGON PLASMA COAGULATION;  Surgeon: Gadiel Whitney M.D.;  Location: [facility];  Service: Gastroenterology   • TX UPPER GI ENDOSCOPY,BIOPSY  11/12/2020    Procedure: GASTROSCOPY, WITH BIOPSY;  Surgeon: Gadiel Whitney M.D.;  Location: [facility];  Service: Gastroenterology   • GASTROSCOPY-ENDO  11/12/2020    Procedure: GASTROSCOPY;  Surgeon: Gadiel Whitney M.D.;  Location: [facility];  Service: Gastroenterology   • GASTROSCOPY-ENDO  9/18/2020    Procedure: GASTROSCOPY;  Surgeon: Gadiel Whitney M.D.;  Location: [facility];  Service: Gastroenterology   • GASTROSCOPY N/A 5/30/2020    Procedure: GASTROSCOPY;  Surgeon: Waylon Mcqueen M.D.;  Location: [facility];  Service: Gastroenterology   • GASTROSCOPY-ENDO  12/9/2019    Procedure: GASTROSCOPY;  Surgeon: Aaron Kam M.D.;  Location: [facility];  Service: Gastroenterology   •  "ANGIOPLASTY  01/17/2018    \"Right Arm AV-Fistulagram & Angioplastyx3\"   • ULI BY LAPAROSCOPY  4/5/2010    Performed by SYED MARTELL at SURGERY [facility] ORS   • AV FISTULA CREATION Right    • OTHER      renal biopsy x 3   • OTHER      bone marrow biopsy       Social History     Socioeconomic History   • Marital status: Single     Spouse name: Not on file   • Number of children: Not on file   • Years of education: Not on file   • Highest education level: Not on file   Occupational History   • Not on file   Tobacco Use   • Smoking status: Never Smoker   • Smokeless tobacco: Never Used   Vaping Use   • Vaping Use: Never used   Substance and Sexual Activity   • Alcohol use: No   • Drug use: No   • Sexual activity: Not on file   Other Topics Concern   • Not on file   Social History Narrative   • Not on file     Social Determinants of Health     Financial Resource Strain: Not on file   Food Insecurity: Not on file   Transportation Needs: Not on file   Physical Activity: Not on file   Stress: Not on file   Social Connections: Not on file   Intimate Partner Violence: Not on file   Housing Stability: Not on file       Family History   Problem Relation Age of Onset   • Diabetes Paternal Grandmother        Allergies   Allergen Reactions   • Cephalexin Rash     Nausea and rash   Hives  .   • Clindamycin Rash     Nausea and rash     Hive  .   • Methylprednisolone      Anxious  Other reaction(s): Other-Reaction in Comments  Anxious    Tolerates prednisone    • Metoprolol Rash and Nausea     Nausea and rash     Tolerates antenalol   • Compazine      C/o anxiety   • Fentanyl And Related Anxiety   • Hydrocodone-Acetaminophen Rash and Nausea     Generalized rash  Generalized rash    Tolerates acetaminophen   • Maxipime [Cefepime] Itching   • Reglan [Metoclopramide] Anxiety   • Tape Rash     Paper tape is ok       Current Facility-Administered Medications   Medication Dose Route Frequency Provider Last Rate Last Admin   • " HYDROmorphone (Dilaudid) injection 1 mg  1 mg Intravenous Once Phil Vega M.D.       • amLODIPine (NORVASC) tablet 5 mg  5 mg Oral Q EVENING ZULEIKA HeartO.   5 mg at 06/10/22 1813   • atenolol (TENORMIN) tablet 25 mg  25 mg Oral DAILY ZULEIKA HeartO.   25 mg at 06/11/22 0523   • cloNIDine (CATAPRES) 0.2 MG/24HR patch 1 Patch  1 Patch Transdermal Q FRIDAY ROGE Heart.O.   1 Patch at 06/10/22 1459   • cloNIDine (CATAPRES) tablet 0.3 mg  0.3 mg Oral BID ZULEIKA HeartO.   0.3 mg at 06/11/22 0523   • HYDROmorphone (DILAUDID) tablet 2 mg  2 mg Oral Q4HRS PRN ZULEIKA HeartO.   2 mg at 06/11/22 1139   • hydroxychloroquine (Plaquenil) tablet 200 mg  200 mg Oral Q EVENING ZULEIKA Heart[facility].   200 mg at 06/10/22 1813   • predniSONE (DELTASONE) tablet 5 mg  5 mg Oral Q EVENING ZULEIKA HeartO.   5 mg at 06/10/22 1813   • senna-docusate (PERICOLACE or SENOKOT S) 8.6-50 MG per tablet 2 Tablet  2 Tablet Oral BID ZULEIKA HeartO.   2 Tablet at 06/11/22 0523    And   • polyethylene glycol/lytes (MIRALAX) PACKET 1 Packet  1 Packet Oral QDAY PRN Jorge Styles D.O.        And   • magnesium hydroxide (MILK OF MAGNESIA) suspension 30 mL  30 mL Oral QDAY PRN Jorge Styles D.O.        And   • bisacodyl (DULCOLAX) suppository 10 mg  10 mg Rectal QDAY PRN Jorge Styles D.O.       • hydrALAZINE (APRESOLINE) injection 10 mg  10 mg Intravenous Q4HRS PRN Jorge Styles D.O.       • ondansetron (ZOFRAN) syringe/vial injection 4 mg  4 mg Intravenous Q4HRS PRN ZULEIKA Heart[facility].   4 mg at 06/11/22 1139   • ondansetron (ZOFRAN ODT) dispertab 4 mg  4 mg Oral Q4HRS PRN Jorge Styles D.O.       • Pharmacy Consult Request ...Pain Management Review 1 Each  1 Each Other PHARMACY TO DOSE Phil Vega M.D.       • omeprazole (PRILOSEC) capsule 20 mg  20 mg Oral BID Phil Vega M.D.   20 mg at 06/11/22 0523   • mycophenolate (CELLCEPT) capsule 500 mg  500 mg Oral Q EVENING  Phil Vega M.D.   500 mg at 06/10/22 1813   • HYDROmorphone (DILAUDID) tablet 1 mg  1 mg Oral Q3HRS PRN Phil Vega M.D.       • acetaminophen (Tylenol) tablet 650 mg  650 mg Oral Q6HRS PRN Phil Vega M.D.   650 mg at 06/10/22 2227   • LORazepam (ATIVAN) injection 0.5 mg  0.5 mg Intravenous Q4HRS PRN Jorge Styles D.O.   0.5 mg at 06/11/22 1248       Physical Exam:  Vitals:    06/11/22 0000 06/11/22 0400 06/11/22 0500 06/11/22 0800   BP:  116/73 136/84 138/85   Pulse:  80 77 70   Resp:  18  16   Temp:  36.8 °C (98.3 °F)  36.8 °C (98.2 °F)   TempSrc:  Oral  Oral   SpO2: 100% 100%  100%   Weight:  52 kg (114 lb 10.2 oz)     Height:         General appearance: NAD, conversant   Eyes: anicteric sclerae, moist conjunctivae; no lid-lag; PERRLA  HENT: Atraumatic; oropharynx clear with moist mucous membranes and no mucosal ulcerations; normal hard and soft palate  Neck: Trachea midline; FROM, supple, no thyromegaly or lymphadenopathy  Lungs: CTA, with normal respiratory effort and no intercostal retractions  CV: RRR, no MRGs, no JVD   Abdomen: Soft, non-tender; no masses or HSM  Extremities: No peripheral edema or extremity lymphadenopathy  Skin: Normal temperature, turgor and texture; no rash, ulcers or subcutaneous nodules  Psych: Appropriate affect, alert and oriented to person, place and time    Data:  Lipids:   Lab Results   Component Value Date/Time    CHOLSTRLTOT 46 (L) 01/05/2011 05:30 PM    TRIGLYCERIDE 64 01/05/2011 05:30 PM    HDL 16 (L) 01/05/2011 04:15 AM    LDL 14 01/05/2011 04:15 AM        BMP:  Lab Results   Component Value Date/Time    SODIUM 133 (L) 06/11/2022 0019    POTASSIUM 5.2 06/11/2022 0019    CHLORIDE 94 (L) 06/11/2022 0019    CO2 28 06/11/2022 0019    GLUCOSE 86 06/11/2022 0019    BUN 24 (H) 06/11/2022 0019    CREATININE 3.71 (H) 06/11/2022 0019    CALCIUM 8.6 06/11/2022 0019    ANION 11.0 06/11/2022 0019        TSH:   Lab Results   Component Value Date/Time     TSHULTRASEN 6.620 (H) 08/04/2021 2246        THYROXINE (T4):   No results found for: FREEDIR     CBC:   Lab Results   Component Value Date/Time    WBC 3.9 (L) 06/11/2022 12:19 AM    RBC 2.92 (L) 06/11/2022 12:19 AM    HEMOGLOBIN 7.8 (L) 06/11/2022 12:19 AM    HEMATOCRIT 26.7 (L) 06/11/2022 12:19 AM    MCV 91.4 06/11/2022 12:19 AM    MCH 26.7 (L) 06/11/2022 12:19 AM    MCHC 29.2 (L) 06/11/2022 12:19 AM    RDW 66.2 (H) 06/11/2022 12:19 AM    PLATELETCT 226 06/11/2022 12:19 AM    MPV 9.2 06/11/2022 12:19 AM    NEUTSPOLYS 84.60 (H) 06/10/2022 04:15 AM    LYMPHOCYTES 10.10 (L) 06/10/2022 04:15 AM    MONOCYTES 4.10 06/10/2022 04:15 AM    EOSINOPHILS 0.40 06/10/2022 04:15 AM    BASOPHILS 0.30 06/10/2022 04:15 AM    IMMGRAN 0.50 06/10/2022 04:15 AM    NRBC 0.00 06/10/2022 04:15 AM    NEUTS 6.26 06/10/2022 04:15 AM    LYMPHS 0.75 (L) 06/10/2022 04:15 AM    LYMPHS 18 05/13/2021 02:21 PM    MONOS 0.30 06/10/2022 04:15 AM    EOS 0.03 06/10/2022 04:15 AM    BASO 0.02 06/10/2022 04:15 AM    IMMGRANAB 0.04 06/10/2022 04:15 AM    NRBCAB 0.00 06/10/2022 04:15 AM        CBC w/o DIFF  Lab Results   Component Value Date/Time    WBC 3.9 (L) 06/11/2022 12:19 AM    RBC 2.92 (L) 06/11/2022 12:19 AM    HEMOGLOBIN 7.8 (L) 06/11/2022 12:19 AM    MCV 91.4 06/11/2022 12:19 AM    MCH 26.7 (L) 06/11/2022 12:19 AM    MCHC 29.2 (L) 06/11/2022 12:19 AM    RDW 66.2 (H) 06/11/2022 12:19 AM    MPV 9.2 06/11/2022 12:19 AM       Prior echo/stress results reviewed: Normal EF    EKG interpreted by me: SR    Impression/Plan:  1. Symptomatic anemia     2. Acute hyperkalemia     3. ESRD needing dialysis (HCC)     4. Hypophosphatemia     5. Hypomagnesemia     6. Myalgia     7. Chronic hypertension       1. NSTEMI type II  2. SLE  3. ESRD on HD  4. Volume overload    - Pt with no chest pain and no ECG changes, 26 years old, with normal echocardiogram 2 months ago, and no prior cardiac history. I do not recommend further trending troponin in this setting. It is  elevated most likely simply due to ESRD state. I recommend discontinuation of troponin testing.  - If shortness of breath does not improve with dialysis then can consider repeating echocardiogram to ensure no changes, but if dialysis improves her symptoms this is not necessary.  - Dr Oliveira had recommended Coronary CTA, this can be performed as outpatient.    Thank you for this consult. Cardiology will sign off.    Milind Ron MD  Cardiac Electrophysiology

## 2022-06-11 NOTE — PROGRESS NOTES
Received report from MICHELLE Alcazar. Safety precautions in place. Bed locked and low. Offered assist. Call light and belongings within reach.

## 2022-06-11 NOTE — DISCHARGE INSTRUCTIONS
Discharge Instructions    Discharged to home by car with relative. Discharged via wheelchair, hospital escort: Refused.  Special equipment needed: Not Applicable    Be sure to schedule a follow-up appointment with your primary care doctor or any specialists as instructed.     Discharge Plan:        I understand that a diet low in cholesterol, fat, and sodium is recommended for good health. Unless I have been given specific instructions below for another diet, I accept this instruction as my diet prescription.   Other diet: Renal    Special Instructions: None    Is patient discharged on Warfarin / Coumadin?   No     Depression / Suicide Risk    As you are discharged from this [facility] facility, it is important to learn how to keep safe from harming yourself.    Recognize the warning signs:  Abrupt changes in personality, positive or negative- including increase in energy   Giving away possessions  Change in eating patterns- significant weight changes-  positive or negative  Change in sleeping patterns- unable to sleep or sleeping all the time   Unwillingness or inability to communicate  Depression  Unusual sadness, discouragement and loneliness  Talk of wanting to die  Neglect of personal appearance   Rebelliousness- reckless behavior  Withdrawal from people/activities they love  Confusion- inability to concentrate     If you or a loved one observes any of these behaviors or has concerns about self-harm, here's what you can do:  Talk about it- your feelings and reasons for harming yourself  Remove any means that you might use to hurt yourself (examples: pills, rope, extension cords, firearm)  Get professional help from the community (Mental Health, Substance Abuse, psychological counseling)  Do not be alone:Call your Safe Contact- someone whom you trust who will be there for you.  Call your local CRISIS HOTLINE 885-8073 or 020-658-7662  Call your local Children's Mobile Crisis Response Team [facility] (489)  714-2600 or www.Stagee.[employer]ge  Call the toll free National Suicide Prevention Hotlines   National Suicide Prevention Lifeline 455-839-VULA (8622)  National [employer] Line Network 800-SUICIDE (676-5299)

## 2022-06-11 NOTE — DISCHARGE SUMMARY
Discharge Summary    CHIEF COMPLAINT ON ADMISSION  Chief Complaint   Patient presents with   • Shortness of Breath     Pt reports it started tonight. Denies cough of fevers.    • Joint Pain     Pt reports an achy feeling all over that started this morning.        Reason for Admission  difficulty breathing, joint pain      Admission Date  6/10/2022    CODE STATUS  Full Code    HPI & HOSPITAL COURSE  This is a26-year-old female with a past medical history of end-stage renal disease on HD MWF with aVF, hypertension, lupus, anemia of chronic renal disease, chronic hypoxemic respiratory failure on 3 L nasal cannula at home admitted 6/10/2022 for worsening shortness of breath, increased oxygen to 4 L with no improvement, reportedly in dialysis on Wednesday had problems with her AV fistula.  In the ED patient was found to have a hemoglobin of 6.7, sodium 126, hyperkalemia 6.1, magnesium level 1.4, troponin 500.  Patient was admitted for hyperkalemia and possible AV fistula malfunction.    Patient had emergency hemodialysis while in the ED.  After repeating labs, she did improve in her potassium, anemia and symptoms of SOB.  Patient did have elevating troponins up to 823, with normal CPK, without chest pain symptoms or other symptoms of ACS.  Cardiologist Dr. Ron evaluated patient and deemed troponinemia related more to ESRD.    I have recommended for patient to set up ambulatory infusion unit for anemia.  She does have an appointment on 6/19/22 and 06/19/22.  Patient has follow up with Dr. Oliveira on 11/3/22, I recommended to calling for earlier appointment with any cardiac specialist to have CT coronary screening ordered as outpatient, as recommended by Dr. Ron.    Patient was discharged safely home.  She was discharged earlier than 2 midnights, as she improved more quickly.  Given her elevated troponin, there was a concern for possible cardiac etiology. Cardiology was able to consult early and make a manzo decision  "about patient's case. Patient also did not require further emergency dialysis as per nephrology after morning follow up.     Therefore, she is discharged in fair and stable condition to home with close outpatient follow-up.    The patient recovered much more quickly than anticipated on admission.    Discharge Date  06/11/2022    FOLLOW UP ITEMS POST DISCHARGE  With PCP, nephrology and cardiology    DISCHARGE DIAGNOSES  Principal Problem (Resolved):    Hyperkalemia POA: Yes  Active Problems:    ESRD (end stage renal disease) on dialysis (HCC) POA: Yes    HTN (hypertension) POA: Yes      Overview: \"Controlled with medication\"    Lupus (HCC) POA: Yes    Elevated troponin chronic (Chronic) POA: Yes    Anemia due to chronic kidney disease POA: Yes    Gastroesophageal reflux disease without esophagitis POA: Yes    Chronic respiratory failure with hypoxia (HCC) POA: Yes  Resolved Problems:    Hyponatremia POA: Yes    Hypomagnesemia POA: Yes      FOLLOW UP  Future Appointments   Date Time Provider Department Center   6/19/2022 10:00 AM [facility] [place]   6/21/2022  1:00 PM Edna Dahl PsyD [facility] [address]   6/22/2022  7:00 AM [facility] [place]   7/19/2022  1:00 PM Edna Dahl PsyD [facility] [address]   8/2/2022  2:00 PM Edna Dahl PsyD [facility] [address]   11/3/2022  1:00 PM Trevor Oliveira M.D. [facility] None     Ella Matthew M.D.  [address]  [facility] [id number] [phone]    Call  If symptoms worsen, As needed.  Continue [facility] ambulatory infusion unit for regular blood transfusions.    [facility]   [address]  [facility] [id number] [phone]  Call  Dr. Oliveira has requested for CTA Coronary study. Please speak with your cardiologist on having this imaging test ordered.    Fadi Najjar, M.D.  [address]  [address]  [place] [id number] [phone]    Call  If symptoms worsen, As needed.  Please " attend your regular [facility] hemodialysis sessions.      MEDICATIONS ON DISCHARGE     Medication List      CONTINUE taking these medications      Instructions   acetaminophen 500 MG Tabs  Commonly known as: TYLENOL   Take 500-1,000 mg by mouth every 6 hours as needed. Indications: Pain  Dose: 500-1,000 mg     amLODIPine 5 MG Tabs  Commonly known as: NORVASC   Take 5 mg by mouth every day.  Dose: 5 mg     atenolol 25 MG Tabs  Commonly known as: TENORMIN   Take 25 mg by mouth every day.  Dose: 25 mg     cloNIDine 0.2 MG/24HR Ptwk patch  Commonly known as: CATAPRES   Place 1 Patch on the skin every 7 days.  Dose: 1 Patch     cloNIDine 0.3 MG Tabs  Commonly known as: CATAPRES   Take 0.3 mg by mouth 2 times a day.  Dose: 0.3 mg     HYDROmorphone 2 MG Tabs  Commonly known as: DILAUDID   Take 2 mg by mouth 1 time a day as needed for Severe Pain.  Dose: 2 mg     hydroxychloroquine 200 MG Tabs  Commonly known as: Plaquenil   Take 200 mg by mouth every day.  Dose: 200 mg     mycophenolate sodium 360 MG Tbec tablet  Commonly known as: MYFORTIC   Take 360 mg by mouth every evening.  Dose: 360 mg     ondansetron 4 MG Tbdp  Commonly known as: ZOFRAN ODT   Take 4 mg by mouth every 6 hours as needed for Nausea.  Dose: 4 mg     pantoprazole 40 MG Tbec  Commonly known as: PROTONIX   Take 1 tablet by mouth 2 times a day.  Dose: 40 mg     predniSONE 5 MG Tabs  Commonly known as: DELTASONE   Take 5 mg by mouth every evening.  Dose: 5 mg            Allergies  Allergies   Allergen Reactions   • Cephalexin Rash     Nausea and rash   Hives  .   • Clindamycin Rash     Nausea and rash     Hive  .   • Methylprednisolone      Anxious  Other reaction(s): Other-Reaction in Comments  Anxious    Tolerates prednisone    • Metoprolol Rash and Nausea     Nausea and rash     Tolerates antenalol   • Compazine      C/o anxiety   • Fentanyl And Related Anxiety   • Hydrocodone-Acetaminophen Rash and Nausea     Generalized rash  Generalized rash    Tolerates  acetaminophen  Tolerates hydromorphone   • Maxipime [Cefepime] Itching   • Reglan [Metoclopramide] Anxiety   • Tape Rash     Paper tape is ok       DIET  Orders Placed This Encounter   Procedures   • Diet Order Diet: Renal     Standing Status:   Standing     Number of Occurrences:   1     Order Specific Question:   Diet:     Answer:   Renal [8]       ACTIVITY  As tolerated.  Weight bearing as tolerated    CONSULTATIONS  Nephrology  cardiology    PROCEDURES  hemodialysis    LABORATORY  Lab Results   Component Value Date    SODIUM 133 (L) 06/11/2022    POTASSIUM 5.2 06/11/2022    CHLORIDE 94 (L) 06/11/2022    CO2 28 06/11/2022    GLUCOSE 86 06/11/2022    BUN 24 (H) 06/11/2022    CREATININE 3.71 (H) 06/11/2022        Lab Results   Component Value Date    WBC 3.9 (L) 06/11/2022    HEMOGLOBIN 7.8 (L) 06/11/2022    HEMATOCRIT 26.7 (L) 06/11/2022    PLATELETCT 226 06/11/2022        Total time of the discharge process exceeds 42 minutes.  More than 50% of time was spent face to face with patient and mother.  This included but not limited to review of hospital course with patient, treatment goals upon discharge, recommendations to PCP, continued and new medications and their adverse reactions, and nursing instructions for patient.

## 2022-06-11 NOTE — CARE PLAN
The patient is Watcher - Medium risk of patient condition declining or worsening    Shift Goals  Clinical Goals: Get urgent dialysis, trend labs  Patient Goals: Feel better  Family Goals: n/a    Progress made toward(s) clinical / shift goals:    Problem: Pain - Standard  Goal: Alleviation of pain or a reduction in pain to the patient’s comfort goal       6/10/2022 2349 by Teddy Germain RKATY  Outcome: Progressing     Problem: Knowledge Deficit - Standard  Goal: Patient and family/care givers will demonstrate understanding of plan of care, disease process/condition, diagnostic tests and medications  Outcome: Progressing     Problem: Fall Risk  Goal: Patient will remain free from falls  Outcome: Progressing     Problem: Psychosocial  Goal: Patient's level of anxiety will decrease  Outcome: Progressing  Goal: Patient's ability to verbalize feelings about condition will improve  Outcome: Progressing  Goal: Patient's ability to re-evaluate and adapt role responsibilities will improve  Outcome: Progressing  Goal: Patient and family will demonstrate ability to cope with life altering diagnosis and/or procedure  Outcome: Progressing  Goal: Spiritual and cultural needs incorporated into hospitalization  Outcome: Progressing     Problem: Hemodynamics  Goal: Patient's hemodynamics, fluid balance and neurologic status will be stable or improve  Outcome: Progressing     Problem: Respiratory  Goal: Patient will achieve/maintain optimum respiratory ventilation and gas exchange  Outcome: Progressing     Problem: Fluid Volume  Goal: Fluid volume balance will be maintained  Outcome: Progressing       Patient is not progressing towards the following goals:

## 2022-06-11 NOTE — PROGRESS NOTES
Nephrology Daily Progress Note    Date of Service  6/11/2022    Chief Complaint  26 y.o. female admitted 6/10/2022 with respiratory failure, anemia    Interval Problem Update  Patient had urgent hemodialysis yesterday, she feels better, however she continues to have generalized joint pain    Review of Systems  Review of Systems   Constitutional: Positive for malaise/fatigue. Negative for chills and fever.   Respiratory: Negative for cough and shortness of breath.    Cardiovascular: Negative for chest pain and leg swelling.   Gastrointestinal: Negative for nausea and vomiting.   Genitourinary: Negative for dysuria, frequency and urgency.   Musculoskeletal: Positive for joint pain.        Physical Exam  Temp:  [36.5 °C (97.7 °F)-36.8 °C (98.3 °F)] 36.8 °C (98.2 °F)  Pulse:  [70-90] 80  Resp:  [16-19] 19  BP: (109-142)/(68-85) 134/80  SpO2:  [97 %-100 %] 100 %    Physical Exam  Vitals and nursing note reviewed.   Constitutional:       General: She is not in acute distress.     Appearance: Normal appearance. She is well-developed. She is ill-appearing. She is not diaphoretic.   HENT:      Head: Normocephalic and atraumatic.      Right Ear: External ear normal.      Left Ear: External ear normal.      Nose: Nose normal.   Eyes:      General: No scleral icterus.        Right eye: No discharge.         Left eye: No discharge.      Conjunctiva/sclera: Conjunctivae normal.   Cardiovascular:      Rate and Rhythm: Normal rate and regular rhythm.      Heart sounds: No murmur heard.  Pulmonary:      Effort: Pulmonary effort is normal. No respiratory distress.      Breath sounds: Normal breath sounds.   Musculoskeletal:         General: No tenderness.      Right lower leg: No edema.      Left lower leg: No edema.   Skin:     General: Skin is warm and dry.      Findings: No erythema.   Neurological:      General: No focal deficit present.      Mental Status: She is alert and oriented to person, place, and time.      Cranial  Nerves: No cranial nerve deficit.   Psychiatric:         Mood and Affect: Mood normal.         Behavior: Behavior normal.         Fluids    Intake/Output Summary (Last 24 hours) at 6/11/2022 1416  Last data filed at 6/11/2022 1320  Gross per 24 hour   Intake 360 ml   Output 3000 ml   Net -2640 ml       Laboratory  Recent Labs     06/10/22  0415 06/10/22  1651 06/11/22  0019   WBC 7.4 5.2 3.9*   RBC 2.56* 3.43* 2.92*   HEMOGLOBIN 6.7* 9.2* 7.8*   HEMATOCRIT 23.0* 30.8* 26.7*   MCV 89.8 89.8 91.4   MCH 26.2* 26.8* 26.7*   MCHC 29.1* 29.9* 29.2*   RDW 66.2* 63.4* 66.2*   PLATELETCT 276 245 226   MPV 9.7 9.1 9.2     Recent Labs     06/10/22  0415 06/10/22  1651 06/11/22  0019   SODIUM 126* 134* 133*   POTASSIUM 6.1* 4.4 5.2   CHLORIDE 86* 95* 94*   CO2 27 29 28   GLUCOSE 86 81 86   BUN 43* 18 24*   CREATININE 5.33* 2.91* 3.71*   CALCIUM 8.6 8.9 8.6     Recent Labs     06/10/22  0415   APTT 30.9   INR 1.20*     No results for input(s): NTPROBNP in the last 72 hours.        Imaging  DX-CHEST-PORTABLE (1 VIEW)   Final Result      Stable cardiomegaly            Assessment/Plan  1 ESRD  2 respiratory failure  3 anemia  4 elevated troponin  no acute need for HD today  Await cardiology input  Renal diet  Daily labs  Renal dose all meds  Avoid nephrotoxins  Prognosis guarded.  Discussed with Dr. Vega

## 2022-06-11 NOTE — PROGRESS NOTES
Pt complaining of persistent pain unrelieved by PO 2mg Dilaudid. Dr Vega paged via volate and one time order of IV dilaudid was administered.

## 2022-06-11 NOTE — PROGRESS NOTES
Telemetry Shift Summary     Rhythm SR  HR Range 73-82  Ectopy N/A  Measurements 0.20/0.08/0.36           Normal Values  Rhythm SR  HR Range    Measurements 0.12-0.20 / 0.06-0.10  / 0.30-0.52

## 2022-06-12 ENCOUNTER — APPOINTMENT (OUTPATIENT)
Dept: ONCOLOGY | Facility: MEDICAL CENTER | Age: 27
End: 2022-06-12
Attending: INTERNAL MEDICINE
Payer: COMMERCIAL

## 2022-06-12 LAB — ENA SM IGG SER-ACNC: 442 AU/ML (ref 0–40)

## 2022-06-13 LAB
ANA INTERPRETIVE COMMENT Q5143: ABNORMAL
ANA PATTERN Q5144: ABNORMAL
ANA TITER Q5145: ABNORMAL
ANTINUCLEAR ANTIBODY (ANA), HEP-2, IGG Q5142: DETECTED

## 2022-06-14 LAB
ENA JO1 AB TITR SER: 9 AU/ML (ref 0–40)
ENA SCL70 IGG SER QL: 3 AU/ML (ref 0–40)
ENA SS-B IGG SER IA-ACNC: 11 AU/ML (ref 0–40)
SSA52 R0ENA AB IGG Q0420: 28 AU/ML (ref 0–40)
SSA60 R0ENA AB IGG Q0419: 114 AU/ML (ref 0–40)

## 2022-06-15 ENCOUNTER — APPOINTMENT (OUTPATIENT)
Dept: ONCOLOGY | Facility: MEDICAL CENTER | Age: 27
End: 2022-06-15
Attending: INTERNAL MEDICINE
Payer: COMMERCIAL

## 2022-06-15 ENCOUNTER — TELEPHONE (OUTPATIENT)
Dept: CARDIOLOGY | Facility: MEDICAL CENTER | Age: 27
End: 2022-06-15
Payer: COMMERCIAL

## 2022-06-15 LAB
DSDNA AB TITR SER CLIF: 218 IU (ref 0–24)
DSDNA IGG TITR SER CLIF: ABNORMAL {TITER}
U1 SNRNP IGG SER QL: 145 UNITS (ref 0–19)

## 2022-06-15 NOTE — TELEPHONE ENCOUNTER
Called pt [phone]  In review of chart, it was recommended by [facility] (seen in ED) that pt move up FU with VR. FU scheduled with AB on 6/28/22 at 8:45am. ER PRECAUTIONS STRESSED. Pt verbalized understanding.

## 2022-06-15 NOTE — TELEPHONE ENCOUNTER
VR    Caller: Lily Nicole    Topic/issue: ORDERS    Patient called and states that upon discharge from the hospital on Saturday 6/11 she was instructed to have VR order a CTA of her heart. Please advise.    Thank you,  Rajiv CAMPBELL    Callback Number: [phone] (home)

## 2022-06-16 ENCOUNTER — HOSPITAL ENCOUNTER (EMERGENCY)
Facility: MEDICAL CENTER | Age: 27
End: 2022-06-17
Attending: STUDENT IN AN ORGANIZED HEALTH CARE EDUCATION/TRAINING PROGRAM
Payer: COMMERCIAL

## 2022-06-16 ENCOUNTER — APPOINTMENT (OUTPATIENT)
Dept: RADIOLOGY | Facility: MEDICAL CENTER | Age: 27
End: 2022-06-16
Attending: STUDENT IN AN ORGANIZED HEALTH CARE EDUCATION/TRAINING PROGRAM
Payer: COMMERCIAL

## 2022-06-16 DIAGNOSIS — R07.9 CHEST PAIN, UNSPECIFIED TYPE: ICD-10-CM

## 2022-06-16 LAB
ALBUMIN SERPL BCP-MCNC: 2.9 G/DL (ref 3.2–4.9)
ALBUMIN/GLOB SERPL: 0.5 G/DL
ALP SERPL-CCNC: 83 U/L (ref 30–99)
ALT SERPL-CCNC: 8 U/L (ref 2–50)
ANION GAP SERPL CALC-SCNC: 10 MMOL/L (ref 7–16)
AST SERPL-CCNC: 23 U/L (ref 12–45)
BASOPHILS # BLD AUTO: 0.5 % (ref 0–1.8)
BASOPHILS # BLD: 0.03 K/UL (ref 0–0.12)
BILIRUB SERPL-MCNC: 0.4 MG/DL (ref 0.1–1.5)
BUN SERPL-MCNC: 27 MG/DL (ref 8–22)
CALCIUM SERPL-MCNC: 8.7 MG/DL (ref 8.4–10.2)
CHLORIDE SERPL-SCNC: 89 MMOL/L (ref 96–112)
CO2 SERPL-SCNC: 32 MMOL/L (ref 20–33)
COMMENT 1642: NORMAL
CREAT SERPL-MCNC: 4.78 MG/DL (ref 0.5–1.4)
EKG IMPRESSION: NORMAL
EOSINOPHIL # BLD AUTO: 0.03 K/UL (ref 0–0.51)
EOSINOPHIL NFR BLD: 0.5 % (ref 0–6.9)
ERYTHROCYTE [DISTWIDTH] IN BLOOD BY AUTOMATED COUNT: 62.4 FL (ref 35.9–50)
GFR SERPLBLD CREATININE-BSD FMLA CKD-EPI: 12 ML/MIN/1.73 M 2
GLOBULIN SER CALC-MCNC: 6.2 G/DL (ref 1.9–3.5)
GLUCOSE SERPL-MCNC: 88 MG/DL (ref 65–99)
HCG SERPL QL: NEGATIVE
HCT VFR BLD AUTO: 25.5 % (ref 37–47)
HGB BLD-MCNC: 7.5 G/DL (ref 12–16)
IMM GRANULOCYTES # BLD AUTO: 0.03 K/UL (ref 0–0.11)
IMM GRANULOCYTES NFR BLD AUTO: 0.5 % (ref 0–0.9)
LYMPHOCYTES # BLD AUTO: 0.61 K/UL (ref 1–4.8)
LYMPHOCYTES NFR BLD: 10.4 % (ref 22–41)
MCH RBC QN AUTO: 26.3 PG (ref 27–33)
MCHC RBC AUTO-ENTMCNC: 29.4 G/DL (ref 33.6–35)
MCV RBC AUTO: 89.5 FL (ref 81.4–97.8)
MONOCYTES # BLD AUTO: 0.19 K/UL (ref 0–0.85)
MONOCYTES NFR BLD AUTO: 3.2 % (ref 0–13.4)
NEUTROPHILS # BLD AUTO: 4.99 K/UL (ref 2–7.15)
NEUTROPHILS NFR BLD: 84.9 % (ref 44–72)
NRBC # BLD AUTO: 0 K/UL
NRBC BLD-RTO: 0 /100 WBC
PLATELET # BLD AUTO: 266 K/UL (ref 164–446)
PLATELET BLD QL SMEAR: NORMAL
PMV BLD AUTO: 9.6 FL (ref 9–12.9)
POTASSIUM SERPL-SCNC: 4.6 MMOL/L (ref 3.6–5.5)
PROT SERPL-MCNC: 9.1 G/DL (ref 6–8.2)
RBC # BLD AUTO: 2.85 M/UL (ref 4.2–5.4)
RBC BLD AUTO: NORMAL
SODIUM SERPL-SCNC: 131 MMOL/L (ref 135–145)
TROPONIN T SERPL-MCNC: 516 NG/L (ref 6–19)
WBC # BLD AUTO: 5.9 K/UL (ref 4.8–10.8)

## 2022-06-16 PROCEDURE — 700111 HCHG RX REV CODE 636 W/ 250 OVERRIDE (IP): Performed by: STUDENT IN AN ORGANIZED HEALTH CARE EDUCATION/TRAINING PROGRAM

## 2022-06-16 PROCEDURE — 93005 ELECTROCARDIOGRAM TRACING: CPT

## 2022-06-16 PROCEDURE — 93005 ELECTROCARDIOGRAM TRACING: CPT | Performed by: STUDENT IN AN ORGANIZED HEALTH CARE EDUCATION/TRAINING PROGRAM

## 2022-06-16 PROCEDURE — 85025 COMPLETE CBC W/AUTO DIFF WBC: CPT

## 2022-06-16 PROCEDURE — 71045 X-RAY EXAM CHEST 1 VIEW: CPT

## 2022-06-16 PROCEDURE — 80053 COMPREHEN METABOLIC PANEL: CPT

## 2022-06-16 PROCEDURE — 96375 TX/PRO/DX INJ NEW DRUG ADDON: CPT

## 2022-06-16 PROCEDURE — 36415 COLL VENOUS BLD VENIPUNCTURE: CPT

## 2022-06-16 PROCEDURE — 99284 EMERGENCY DEPT VISIT MOD MDM: CPT

## 2022-06-16 PROCEDURE — 96374 THER/PROPH/DIAG INJ IV PUSH: CPT

## 2022-06-16 PROCEDURE — 84484 ASSAY OF TROPONIN QUANT: CPT

## 2022-06-16 PROCEDURE — 84703 CHORIONIC GONADOTROPIN ASSAY: CPT

## 2022-06-16 RX ORDER — HYDROMORPHONE HYDROCHLORIDE 1 MG/ML
1 INJECTION, SOLUTION INTRAMUSCULAR; INTRAVENOUS; SUBCUTANEOUS ONCE
Status: COMPLETED | OUTPATIENT
Start: 2022-06-16 | End: 2022-06-16

## 2022-06-16 RX ORDER — ONDANSETRON 2 MG/ML
4 INJECTION INTRAMUSCULAR; INTRAVENOUS ONCE
Status: COMPLETED | OUTPATIENT
Start: 2022-06-16 | End: 2022-06-16

## 2022-06-16 RX ADMIN — HYDROMORPHONE HYDROCHLORIDE 1 MG: 1 INJECTION, SOLUTION INTRAMUSCULAR; INTRAVENOUS; SUBCUTANEOUS at 23:01

## 2022-06-16 RX ADMIN — ONDANSETRON 4 MG: 2 INJECTION INTRAMUSCULAR; INTRAVENOUS at 23:01

## 2022-06-16 ASSESSMENT — HEART SCORE
ECG: NON-SPECIFIC REPOLARIZATION DISTURBANCE
HISTORY: SLIGHTLY SUSPICIOUS
RISK FACTORS: 1-2 RISK FACTORS
TROPONIN: GREATER THAN OR EQUAL TO 3 TIMES NORMAL LIMIT
HEART SCORE: 4
AGE: <45

## 2022-06-16 ASSESSMENT — ENCOUNTER SYMPTOMS: SHORTNESS OF BREATH: 1

## 2022-06-16 ASSESSMENT — PAIN DESCRIPTION - PAIN TYPE: TYPE: ACUTE PAIN

## 2022-06-16 NOTE — ASSESSMENT & PLAN NOTE
Previously due to fluid overload and improved with HD  Now assessing re: pulmonarry HTN or not secondary to her lupus  ECHO with RVSP of 35 mmhg  She will need a right heart cath to assess  Pt would like to wait till August to assess with right heart cath and pulmonary pressures  Follow up in August  Encouraged daily activity

## 2022-06-17 VITALS
OXYGEN SATURATION: 100 % | DIASTOLIC BLOOD PRESSURE: 90 MMHG | HEART RATE: 86 BPM | SYSTOLIC BLOOD PRESSURE: 148 MMHG | RESPIRATION RATE: 12 BRPM

## 2022-06-17 PROCEDURE — A9270 NON-COVERED ITEM OR SERVICE: HCPCS | Performed by: STUDENT IN AN ORGANIZED HEALTH CARE EDUCATION/TRAINING PROGRAM

## 2022-06-17 PROCEDURE — 700102 HCHG RX REV CODE 250 W/ 637 OVERRIDE(OP): Performed by: STUDENT IN AN ORGANIZED HEALTH CARE EDUCATION/TRAINING PROGRAM

## 2022-06-17 RX ORDER — LORAZEPAM 1 MG/1
0.5 TABLET ORAL ONCE
Status: COMPLETED | OUTPATIENT
Start: 2022-06-17 | End: 2022-06-17

## 2022-06-17 RX ADMIN — LORAZEPAM 0.5 MG: 1 TABLET ORAL at 00:16

## 2022-06-17 NOTE — DISCHARGE INSTRUCTIONS
You should follow-up with cardiology as soon as possible call them in the morning, and see if they can expedite your appointment if you develop any significant shortness of breath ripping tearing back pain numbness tingling weakness in extremities, pain does not improve the next few days return for recheck.

## 2022-06-17 NOTE — ED PROVIDER NOTES
CHIEF COMPLAINT  Chief Complaint   Patient presents with   • Chest Pain     Chest pain and htn for past 5 days. Per ems was scheduled for CTA but not until July. Pt on dialysis.    • Hypertension       HPI  Lily Nicole is a 26 y.o. female who presents for evaluation of chest pain for 5 days of chest pain.  Pain is described as a sharp sensation that is worse with movement better with rest it is on the right and left side of her chest, patient does have a history of lupus end-stage renal disease on dialysis Monday Wednesday Friday last dialysis was on Wednesday.  Denies any increased shortness of breath.  She was recently seen and hospitalized for anemia, hyperkalemia and chest pain, on that admission was found to have an elevated troponin was referred to outpatient cardio follow-up and is scheduled to have a CT coronary angiogram coming in July.  States the pain continued and was not relieved with her at home Dilaudid so she came to the emergency department for evaluation    REVIEW OF SYSTEMS  See HPI for further details. All other systems are negative.     PAST MEDICAL HISTORY   has a past medical history of Anemia (01/17/2018), AVF (arteriovenous fistula) ([facility]), Chest pain, Chest tightness, Daytime sleepiness, Dialysis patient ([facility]), Difficulty breathing, ESRD (end stage renal disease) on dialysis ([facility]) (01/17/2018), Gasping for breath, Heart burn, Hypertension (01/17/2018), Indigestion, Lupus ([facility]), Migraines (01/17/2018), Painful breathing, Palpitations, Seizure ([facility]) (2013), Shortness of breath, Swelling of lower extremity, and Wheezing.    SOCIAL HISTORY  Social History     Tobacco Use   • Smoking status: Never Smoker   • Smokeless tobacco: Never Used   Vaping Use   • Vaping Use: Never used   Substance and Sexual Activity   • Alcohol use: No   • Drug use: No   • Sexual activity: Not on file       SURGICAL HISTORY   has a past surgical history that includes ronak by laparoscopy (4/5/2010); av fistula  creation (Right); angioplasty (01/17/2018); other; other; gastroscopy-endo (12/9/2019); gastroscopy (N/A, 5/30/2020); gastroscopy-endo (9/18/2020); upper gi endoscopy,ctrl bleed (11/12/2020); upper gi endoscopy,biopsy (11/12/2020); gastroscopy-endo (11/12/2020); colonoscopy,diagnostic (1/8/2021); upper gi endoscopy,diagnosis (1/8/2021); upper gi endoscopy,ctrl bleed (1/8/2021); upper gi endoscopy,diagnosis (3/5/2021); upper gi endoscopy,diagnosis (N/A, 3/19/2021); upper gi endoscopy,ctrl bleed (3/19/2021); and bronchoscopy,diagnostic (Bilateral, 5/13/2021).    CURRENT MEDICATIONS  Home Medications    **Home medications have not yet been reviewed for this encounter**         ALLERGIES  Allergies   Allergen Reactions   • Cephalexin Rash     Nausea and rash   Hives  .   • Clindamycin Rash     Nausea and rash     Hive  .   • Methylprednisolone      Anxious  Other reaction(s): Other-Reaction in Comments  Anxious    Tolerates prednisone    • Metoprolol Rash and Nausea     Nausea and rash     Tolerates antenalol   • Compazine      C/o anxiety   • Fentanyl And Related Anxiety   • Hydrocodone-Acetaminophen Rash and Nausea     Generalized rash  Generalized rash    Tolerates acetaminophen  Tolerates hydromorphone   • Maxipime [Cefepime] Itching   • Reglan [Metoclopramide] Anxiety   • Tape Rash     Paper tape is ok       FAMILY HISTORY  No pertinent family history    PHYSICAL EXAM   BP (!) 177/109   Pulse 61   Resp 19   SpO2 91%  @[id number]::@   Pulse ox interpretation: I interpret this pulse ox as normal.  VITALS - vital signs documented prior to this note have been reviewed and noted,  GENERAL - awake, alert, oriented, GCS 15, no apparent distress, non-toxic  appearing  HEENT - normocephalic, atraumatic, pupils equal, sclera anicteric, mucus  membranes moist  NECK - supple, no meningismus, full active range of motion, trachea midline  CARDIOVASCULAR - regular rate/rhythm, no murmurs/gallops/rubs right costochondral  junctions are tender to palpation  PULMONARY - no respiratory distress, speaking in full sentences, clear to  auscultation bilaterally, no wheezing/ronchi/rales, no accessory muscle use  GASTROINTESTINAL - soft, non-tender, non-distended, no rebound, guarding,  or peritonitis  GENITOURINARY - Deferred  NEUROLOGIC - Awake alert, normal mental status, speech fluid, cognition  normal, moves all extremities  MUSCULOSKELETAL - no obvious asymmetry or deformities present  EXTREMITIES - warm, well-perfused, no cyanosis or significant edema AV fistula in the right upper extremity with a palpable thrill  DERMATOLOGIC - warm, dry, no rashes, no jaundice  PSYCHIATRIC - normal affect, normal insight, normal concentration            EKG  EKG obtained at 2201 shows a normal sinus rhythm with a left axis deviation LVH, 1 mL of ST elevation in V2, no STEMI pattern otherwise normal ID QRS QTc interval interpretation is sinus rhythm with LVH.  Unchange from compared to prior    LABS      Labs Reviewed   CBC WITH DIFFERENTIAL - Abnormal; Notable for the following components:       Result Value    RBC 2.85 (*)     Hemoglobin 7.5 (*)     Hematocrit 25.5 (*)     MCH 26.3 (*)     MCHC 29.4 (*)     RDW 62.4 (*)     Neutrophils-Polys 84.90 (*)     Lymphocytes 10.40 (*)     Lymphs (Absolute) 0.61 (*)     All other components within normal limits   COMP METABOLIC PANEL - Abnormal; Notable for the following components:    Sodium 131 (*)     Chloride 89 (*)     Bun 27 (*)     Creatinine 4.78 (*)     Albumin 2.9 (*)     Total Protein 9.1 (*)     Globulin 6.2 (*)     All other components within normal limits   TROPONIN - Abnormal; Notable for the following components:    Troponin T 516 (*)     All other components within normal limits   ESTIMATED GFR - Abnormal; Notable for the following components:    GFR (CKD-EPI) 12 (*)     All other components within normal limits   HCG QUAL SERUM   PLATELET ESTIMATE   MORPHOLOGY   DIFFERENTIAL COMMENT          Pertinent Labs & Imaging studies reviewed. (See chart for details)    RADIOLOGY  DX-CHEST-PORTABLE (1 VIEW)   Final Result      Stable cardiomegaly.                ED COURSE/PROCEDURES    A bedside cardiac heart was visualized in the long short axis, and a periapical view was obtained as well.  There is no appreciable pericardial effusions, ultrasound was performed by myself, grossly normal ejection fraction, no appreciable wall motion abnormalities, no D sign, unremarkable bedside cardiac ultrasound      Medications   ondansetron (ZOFRAN) syringe/vial injection 4 mg (4 mg Intravenous Given 6/16/22 2301)   HYDROmorphone (Dilaudid) injection 1 mg (1 mg Intravenous Given 6/16/22 2301)               MEDICAL DECISION MAKING     patient presented for evaluation of chest pain.     Differential initially included was not limited to pneumonia pneumothorax pleural effusion pulmonary embolism ACS costochondritis pleuritis pericarditis myocarditis musculoskeletal strain less likely aortic dissection or esophageal rupture among other considerations.  She has no increased shortness of breath, hypoxia, or tachycardia, wells score 0 perc negtive lowering my concern for pulmonary embolism.  Pain is been present for the past 5 days EKG is nonischemic lower concern for atypical ACS.  She was seen and evaluated by cardiology during her last hospital admission, had a normal echocardiogram done 2 months ago and, at that time was felt her troponin was likely due to her end-stage renal disease, today troponin is downtrending when compared to prior.  Heart score is 4 0 for history 1 for EKG 1 points for risk factors 2 for troponin. Pain is reproducible on examination, may be component of costochondritis versus pericarditis versus musculoskeletal pain though due to her elevated heart score she is intermediate risk for Mace. Labs showed no evidence of significant metabolic derangements, acidosis, or electrolyte disturbances she is  not a fluid overload, and no indication for emergent dialysis at this point.  Her hemoglobin is stable.  Vital signs have been stable she has been observed for several hours in the emergency department, with no arrhythmias noted on the monitor, and her pain did improve after single dose of Dilaudid in the emergency department. Did discuess patients heart score and her risk of moderate chest pain with her and her father at bedside and after shared decsion making felt comfortable with continued outpatient follow up.  all pertinent return precautions were discussed with the patient, and they expressed understanding.  Patient was discharged in a stable condition              FINAL IMPRESSION  1.  Chest pain  2.  Elevated troponin  3.  End-stage renal disease         Electronically signed by: Fortunato Larose D.O., 6/16/2022 10:30 PM      Dictation Disclaimer  Please note this report has been produced using speech recognition software and  may contain errors related to that system, including errors seen in grammar,  punctuation and spelling, as well as words and phrases that may be inappropriate.  If there are any questions or concerns, please feel free to contact the dictating  physician for clarification.

## 2022-06-17 NOTE — ED NOTES
Patient medicated as ordered then ok to discharge home.    .IV saline lock x 2 discontinued with cathlon intact    Discharge home with instructions and follow up care reviewed and given to patient with verbal understanding.    Family member with patient.

## 2022-06-17 NOTE — ED TRIAGE NOTES
Bib ems for above complaints.     Chief Complaint   Patient presents with   • Chest Pain     Chest pain and htn for past 5 days. Per ems was scheduled for CTA but not until July. Pt on dialysis.    • Hypertension     BP (!) 170/107   Pulse 84   Resp 16   SpO2 99%

## 2022-06-21 ENCOUNTER — APPOINTMENT (OUTPATIENT)
Dept: BEHAVIORAL HEALTH | Facility: CLINIC | Age: 27
End: 2022-06-21
Payer: COMMERCIAL

## 2022-06-22 ENCOUNTER — APPOINTMENT (OUTPATIENT)
Dept: ONCOLOGY | Facility: MEDICAL CENTER | Age: 27
End: 2022-06-22
Attending: INTERNAL MEDICINE
Payer: COMMERCIAL

## 2022-07-10 ENCOUNTER — HOSPITAL ENCOUNTER (INPATIENT)
Facility: MEDICAL CENTER | Age: 27
LOS: 1 days | DRG: 640 | End: 2022-07-11
Attending: EMERGENCY MEDICINE | Admitting: INTERNAL MEDICINE
Payer: COMMERCIAL

## 2022-07-10 DIAGNOSIS — D63.8 ANEMIA, CHRONIC DISEASE: ICD-10-CM

## 2022-07-10 DIAGNOSIS — E87.1 HYPONATREMIA: Primary | ICD-10-CM

## 2022-07-10 DIAGNOSIS — N18.6 STAGE 5 CHRONIC KIDNEY DISEASE ON CHRONIC DIALYSIS (HCC): ICD-10-CM

## 2022-07-10 DIAGNOSIS — E87.5 HYPERKALEMIA: ICD-10-CM

## 2022-07-10 DIAGNOSIS — I21.4 NSTEMI (NON-ST ELEVATED MYOCARDIAL INFARCTION) (HCC): ICD-10-CM

## 2022-07-10 DIAGNOSIS — Z99.2 STAGE 5 CHRONIC KIDNEY DISEASE ON CHRONIC DIALYSIS (HCC): ICD-10-CM

## 2022-07-10 PROCEDURE — 36415 COLL VENOUS BLD VENIPUNCTURE: CPT

## 2022-07-10 PROCEDURE — 93005 ELECTROCARDIOGRAM TRACING: CPT | Performed by: EMERGENCY MEDICINE

## 2022-07-10 PROCEDURE — 99285 EMERGENCY DEPT VISIT HI MDM: CPT

## 2022-07-10 PROCEDURE — 94760 N-INVAS EAR/PLS OXIMETRY 1: CPT

## 2022-07-10 ASSESSMENT — FIBROSIS 4 INDEX: FIB4 SCORE: 0.73

## 2022-07-11 ENCOUNTER — APPOINTMENT (OUTPATIENT)
Dept: RADIOLOGY | Facility: MEDICAL CENTER | Age: 27
DRG: 640 | End: 2022-07-11
Attending: EMERGENCY MEDICINE
Payer: COMMERCIAL

## 2022-07-11 VITALS
SYSTOLIC BLOOD PRESSURE: 110 MMHG | WEIGHT: 117.73 LBS | HEIGHT: 66 IN | BODY MASS INDEX: 18.92 KG/M2 | DIASTOLIC BLOOD PRESSURE: 60 MMHG | TEMPERATURE: 98.1 F | RESPIRATION RATE: 18 BRPM | HEART RATE: 68 BPM | OXYGEN SATURATION: 100 %

## 2022-07-11 PROBLEM — E87.1 HYPONATREMIA: Status: ACTIVE | Noted: 2022-07-11

## 2022-07-11 LAB
ABO GROUP BLD: NORMAL
ALBUMIN SERPL BCP-MCNC: 2.5 G/DL (ref 3.2–4.9)
ALBUMIN/GLOB SERPL: 0.4 G/DL
ALP SERPL-CCNC: 87 U/L (ref 30–99)
ALT SERPL-CCNC: 9 U/L (ref 2–50)
ANION GAP SERPL CALC-SCNC: 14 MMOL/L (ref 7–16)
ANION GAP SERPL CALC-SCNC: 14 MMOL/L (ref 7–16)
APTT PPP: 36.4 SEC (ref 24.7–36)
AST SERPL-CCNC: 21 U/L (ref 12–45)
BARCODED ABORH UBTYP: 5100
BARCODED ABORH UBTYP: 5100
BARCODED PRD CODE UBPRD: NORMAL
BARCODED PRD CODE UBPRD: NORMAL
BARCODED UNIT NUM UBUNT: NORMAL
BARCODED UNIT NUM UBUNT: NORMAL
BASOPHILS # BLD AUTO: 0.3 % (ref 0–1.8)
BASOPHILS # BLD: 0.02 K/UL (ref 0–0.12)
BILIRUB SERPL-MCNC: 0.4 MG/DL (ref 0.1–1.5)
BLD GP AB SCN SERPL QL: NORMAL
BUN SERPL-MCNC: 52 MG/DL (ref 8–22)
BUN SERPL-MCNC: 59 MG/DL (ref 8–22)
CALCIUM SERPL-MCNC: 8.1 MG/DL (ref 8.4–10.2)
CALCIUM SERPL-MCNC: 8.5 MG/DL (ref 8.4–10.2)
CHLORIDE SERPL-SCNC: 81 MMOL/L (ref 96–112)
CHLORIDE SERPL-SCNC: 82 MMOL/L (ref 96–112)
CO2 SERPL-SCNC: 25 MMOL/L (ref 20–33)
CO2 SERPL-SCNC: 25 MMOL/L (ref 20–33)
COMPONENT R 8504R: NORMAL
COMPONENT R 8504R: NORMAL
CREAT SERPL-MCNC: 6.49 MG/DL (ref 0.5–1.4)
CREAT SERPL-MCNC: 6.76 MG/DL (ref 0.5–1.4)
EKG IMPRESSION: NORMAL
EOSINOPHIL # BLD AUTO: 0.03 K/UL (ref 0–0.51)
EOSINOPHIL NFR BLD: 0.5 % (ref 0–6.9)
ERYTHROCYTE [DISTWIDTH] IN BLOOD BY AUTOMATED COUNT: 60.8 FL (ref 35.9–50)
ERYTHROCYTE [DISTWIDTH] IN BLOOD BY AUTOMATED COUNT: 61.1 FL (ref 35.9–50)
FLUAV RNA SPEC QL NAA+PROBE: NEGATIVE
FLUBV RNA SPEC QL NAA+PROBE: NEGATIVE
GFR SERPLBLD CREATININE-BSD FMLA CKD-EPI: 8 ML/MIN/1.73 M 2
GFR SERPLBLD CREATININE-BSD FMLA CKD-EPI: 9 ML/MIN/1.73 M 2
GLOBULIN SER CALC-MCNC: 6 G/DL (ref 1.9–3.5)
GLUCOSE SERPL-MCNC: 71 MG/DL (ref 65–99)
GLUCOSE SERPL-MCNC: 92 MG/DL (ref 65–99)
HCT VFR BLD AUTO: 24.2 % (ref 37–47)
HCT VFR BLD AUTO: 25.1 % (ref 37–47)
HGB BLD-MCNC: 7.2 G/DL (ref 12–16)
HGB BLD-MCNC: 7.6 G/DL (ref 12–16)
IMM GRANULOCYTES # BLD AUTO: 0.02 K/UL (ref 0–0.11)
IMM GRANULOCYTES NFR BLD AUTO: 0.3 % (ref 0–0.9)
INR PPP: 1.34 (ref 0.87–1.13)
LACTATE BLD-SCNC: 1.9 MMOL/L (ref 0.5–2)
LYMPHOCYTES # BLD AUTO: 0.58 K/UL (ref 1–4.8)
LYMPHOCYTES NFR BLD: 8.8 % (ref 22–41)
MCH RBC QN AUTO: 25.9 PG (ref 27–33)
MCH RBC QN AUTO: 26 PG (ref 27–33)
MCHC RBC AUTO-ENTMCNC: 29.8 G/DL (ref 33.6–35)
MCHC RBC AUTO-ENTMCNC: 30.3 G/DL (ref 33.6–35)
MCV RBC AUTO: 86 FL (ref 81.4–97.8)
MCV RBC AUTO: 87.1 FL (ref 81.4–97.8)
MONOCYTES # BLD AUTO: 0.19 K/UL (ref 0–0.85)
MONOCYTES NFR BLD AUTO: 2.9 % (ref 0–13.4)
NEUTROPHILS # BLD AUTO: 5.74 K/UL (ref 2–7.15)
NEUTROPHILS NFR BLD: 87.2 % (ref 44–72)
NRBC # BLD AUTO: 0 K/UL
NRBC BLD-RTO: 0 /100 WBC
NT-PROBNP SERPL IA-MCNC: ABNORMAL PG/ML (ref 0–125)
PLATELET # BLD AUTO: 229 K/UL (ref 164–446)
PLATELET # BLD AUTO: 238 K/UL (ref 164–446)
PMV BLD AUTO: 10.4 FL (ref 9–12.9)
PMV BLD AUTO: 10.6 FL (ref 9–12.9)
POTASSIUM SERPL-SCNC: 5.1 MMOL/L (ref 3.6–5.5)
POTASSIUM SERPL-SCNC: 5.7 MMOL/L (ref 3.6–5.5)
PRODUCT TYPE UPROD: NORMAL
PRODUCT TYPE UPROD: NORMAL
PROT SERPL-MCNC: 8.5 G/DL (ref 6–8.2)
PROTHROMBIN TIME: 16 SEC (ref 12–14.6)
RBC # BLD AUTO: 2.78 M/UL (ref 4.2–5.4)
RBC # BLD AUTO: 2.92 M/UL (ref 4.2–5.4)
RH BLD: NORMAL
RSV RNA SPEC QL NAA+PROBE: NEGATIVE
SARS-COV-2 RNA RESP QL NAA+PROBE: NOTDETECTED
SODIUM SERPL-SCNC: 120 MMOL/L (ref 135–145)
SODIUM SERPL-SCNC: 121 MMOL/L (ref 135–145)
SODIUM SERPL-SCNC: 135 MMOL/L (ref 135–145)
SPECIMEN SOURCE: NORMAL
TROPONIN T SERPL-MCNC: 734 NG/L (ref 6–19)
TROPONIN T SERPL-MCNC: 738 NG/L (ref 6–19)
TROPONIN T SERPL-MCNC: 824 NG/L (ref 6–19)
UNIT STATUS USTAT: NORMAL
UNIT STATUS USTAT: NORMAL
WBC # BLD AUTO: 6.6 K/UL (ref 4.8–10.8)
WBC # BLD AUTO: 7.5 K/UL (ref 4.8–10.8)

## 2022-07-11 PROCEDURE — A9270 NON-COVERED ITEM OR SERVICE: HCPCS | Performed by: INTERNAL MEDICINE

## 2022-07-11 PROCEDURE — 83880 ASSAY OF NATRIURETIC PEPTIDE: CPT

## 2022-07-11 PROCEDURE — 80053 COMPREHEN METABOLIC PANEL: CPT

## 2022-07-11 PROCEDURE — 99236 HOSP IP/OBS SAME DATE HI 85: CPT | Performed by: INTERNAL MEDICINE

## 2022-07-11 PROCEDURE — 700111 HCHG RX REV CODE 636 W/ 250 OVERRIDE (IP): Performed by: INTERNAL MEDICINE

## 2022-07-11 PROCEDURE — 96365 THER/PROPH/DIAG IV INF INIT: CPT

## 2022-07-11 PROCEDURE — 700111 HCHG RX REV CODE 636 W/ 250 OVERRIDE (IP): Performed by: EMERGENCY MEDICINE

## 2022-07-11 PROCEDURE — 85730 THROMBOPLASTIN TIME PARTIAL: CPT

## 2022-07-11 PROCEDURE — 84295 ASSAY OF SERUM SODIUM: CPT

## 2022-07-11 PROCEDURE — 84484 ASSAY OF TROPONIN QUANT: CPT | Mod: 91

## 2022-07-11 PROCEDURE — 80048 BASIC METABOLIC PNL TOTAL CA: CPT

## 2022-07-11 PROCEDURE — 85610 PROTHROMBIN TIME: CPT

## 2022-07-11 PROCEDURE — 36430 TRANSFUSION BLD/BLD COMPNT: CPT

## 2022-07-11 PROCEDURE — P9016 RBC LEUKOCYTES REDUCED: HCPCS

## 2022-07-11 PROCEDURE — 83605 ASSAY OF LACTIC ACID: CPT

## 2022-07-11 PROCEDURE — 86900 BLOOD TYPING SEROLOGIC ABO: CPT

## 2022-07-11 PROCEDURE — 85027 COMPLETE CBC AUTOMATED: CPT

## 2022-07-11 PROCEDURE — 30233N1 TRANSFUSION OF NONAUTOLOGOUS RED BLOOD CELLS INTO PERIPHERAL VEIN, PERCUTANEOUS APPROACH: ICD-10-PCS | Performed by: EMERGENCY MEDICINE

## 2022-07-11 PROCEDURE — 90935 HEMODIALYSIS ONE EVALUATION: CPT

## 2022-07-11 PROCEDURE — 5A1D70Z PERFORMANCE OF URINARY FILTRATION, INTERMITTENT, LESS THAN 6 HOURS PER DAY: ICD-10-PCS | Performed by: INTERNAL MEDICINE

## 2022-07-11 PROCEDURE — 99255 IP/OBS CONSLTJ NEW/EST HI 80: CPT | Performed by: INTERNAL MEDICINE

## 2022-07-11 PROCEDURE — 700102 HCHG RX REV CODE 250 W/ 637 OVERRIDE(OP): Performed by: INTERNAL MEDICINE

## 2022-07-11 PROCEDURE — 96375 TX/PRO/DX INJ NEW DRUG ADDON: CPT

## 2022-07-11 PROCEDURE — C9803 HOPD COVID-19 SPEC COLLECT: HCPCS | Performed by: EMERGENCY MEDICINE

## 2022-07-11 PROCEDURE — 86902 BLOOD TYPE ANTIGEN DONOR EA: CPT

## 2022-07-11 PROCEDURE — 700101 HCHG RX REV CODE 250: Performed by: INTERNAL MEDICINE

## 2022-07-11 PROCEDURE — 36415 COLL VENOUS BLD VENIPUNCTURE: CPT

## 2022-07-11 PROCEDURE — 71045 X-RAY EXAM CHEST 1 VIEW: CPT

## 2022-07-11 PROCEDURE — 85025 COMPLETE CBC W/AUTO DIFF WBC: CPT

## 2022-07-11 PROCEDURE — 86901 BLOOD TYPING SEROLOGIC RH(D): CPT

## 2022-07-11 PROCEDURE — 86922 COMPATIBILITY TEST ANTIGLOB: CPT | Mod: 91

## 2022-07-11 PROCEDURE — 0241U HCHG SARS-COV-2 COVID-19 NFCT DS RESP RNA 4 TRGT MIC: CPT

## 2022-07-11 PROCEDURE — 86850 RBC ANTIBODY SCREEN: CPT

## 2022-07-11 PROCEDURE — 770020 HCHG ROOM/CARE - TELE (206)

## 2022-07-11 PROCEDURE — C9113 INJ PANTOPRAZOLE SODIUM, VIA: HCPCS | Performed by: EMERGENCY MEDICINE

## 2022-07-11 RX ORDER — PANTOPRAZOLE SODIUM 40 MG/10ML
40 INJECTION, POWDER, LYOPHILIZED, FOR SOLUTION INTRAVENOUS ONCE
Status: COMPLETED | OUTPATIENT
Start: 2022-07-11 | End: 2022-07-11

## 2022-07-11 RX ORDER — CLONIDINE 0.2 MG/24H
1 PATCH, EXTENDED RELEASE TRANSDERMAL
Status: DISCONTINUED | OUTPATIENT
Start: 2022-07-11 | End: 2022-07-11 | Stop reason: HOSPADM

## 2022-07-11 RX ORDER — ONDANSETRON 2 MG/ML
4 INJECTION INTRAMUSCULAR; INTRAVENOUS ONCE
Status: COMPLETED | OUTPATIENT
Start: 2022-07-11 | End: 2022-07-11

## 2022-07-11 RX ORDER — MYCOPHENOLATE MOFETIL 250 MG/1
500 CAPSULE ORAL EVERY EVENING
Status: DISCONTINUED | OUTPATIENT
Start: 2022-07-11 | End: 2022-07-11 | Stop reason: HOSPADM

## 2022-07-11 RX ORDER — ACETAMINOPHEN 325 MG/1
650 TABLET ORAL EVERY 6 HOURS PRN
Status: DISCONTINUED | OUTPATIENT
Start: 2022-07-11 | End: 2022-07-11 | Stop reason: HOSPADM

## 2022-07-11 RX ORDER — HYDROMORPHONE HYDROCHLORIDE 2 MG/1
2 TABLET ORAL EVERY 6 HOURS PRN
Status: DISCONTINUED | OUTPATIENT
Start: 2022-07-11 | End: 2022-07-11 | Stop reason: HOSPADM

## 2022-07-11 RX ORDER — CALCIUM GLUCONATE 20 MG/ML
2 INJECTION, SOLUTION INTRAVENOUS ONCE
Status: COMPLETED | OUTPATIENT
Start: 2022-07-11 | End: 2022-07-11

## 2022-07-11 RX ORDER — OXYCODONE HYDROCHLORIDE 5 MG/1
5 TABLET ORAL
Status: DISCONTINUED | OUTPATIENT
Start: 2022-07-11 | End: 2022-07-11 | Stop reason: HOSPADM

## 2022-07-11 RX ORDER — PREDNISONE 5 MG/1
5 TABLET ORAL EVERY EVENING
Status: DISCONTINUED | OUTPATIENT
Start: 2022-07-11 | End: 2022-07-11 | Stop reason: HOSPADM

## 2022-07-11 RX ORDER — AMLODIPINE BESYLATE 5 MG/1
5 TABLET ORAL DAILY
Status: DISCONTINUED | OUTPATIENT
Start: 2022-07-11 | End: 2022-07-11 | Stop reason: HOSPADM

## 2022-07-11 RX ORDER — DIPHENHYDRAMINE HYDROCHLORIDE 50 MG/ML
12.5 INJECTION INTRAMUSCULAR; INTRAVENOUS
Status: COMPLETED | OUTPATIENT
Start: 2022-07-11 | End: 2022-07-11

## 2022-07-11 RX ORDER — HEPARIN SODIUM 1000 [USP'U]/ML
1500 INJECTION, SOLUTION INTRAVENOUS; SUBCUTANEOUS
Status: DISCONTINUED | OUTPATIENT
Start: 2022-07-11 | End: 2022-07-11 | Stop reason: HOSPADM

## 2022-07-11 RX ORDER — ONDANSETRON 2 MG/ML
4 INJECTION INTRAMUSCULAR; INTRAVENOUS EVERY 4 HOURS PRN
Status: DISCONTINUED | OUTPATIENT
Start: 2022-07-11 | End: 2022-07-11 | Stop reason: HOSPADM

## 2022-07-11 RX ORDER — HYDROXYCHLOROQUINE SULFATE 200 MG/1
200 TABLET, FILM COATED ORAL DAILY
Status: DISCONTINUED | OUTPATIENT
Start: 2022-07-11 | End: 2022-07-11 | Stop reason: HOSPADM

## 2022-07-11 RX ORDER — LABETALOL HYDROCHLORIDE 5 MG/ML
10 INJECTION, SOLUTION INTRAVENOUS EVERY 4 HOURS PRN
Status: DISCONTINUED | OUTPATIENT
Start: 2022-07-11 | End: 2022-07-11 | Stop reason: HOSPADM

## 2022-07-11 RX ORDER — BISACODYL 10 MG
10 SUPPOSITORY, RECTAL RECTAL
Status: DISCONTINUED | OUTPATIENT
Start: 2022-07-11 | End: 2022-07-11 | Stop reason: HOSPADM

## 2022-07-11 RX ORDER — MYCOPHENOLIC ACID 360 MG/1
360 TABLET, DELAYED RELEASE ORAL EVERY EVENING
Status: DISCONTINUED | OUTPATIENT
Start: 2022-07-11 | End: 2022-07-11

## 2022-07-11 RX ORDER — POLYETHYLENE GLYCOL 3350 17 G/17G
1 POWDER, FOR SOLUTION ORAL
Status: DISCONTINUED | OUTPATIENT
Start: 2022-07-11 | End: 2022-07-11 | Stop reason: HOSPADM

## 2022-07-11 RX ORDER — CLONIDINE HYDROCHLORIDE 0.1 MG/1
0.3 TABLET ORAL 2 TIMES DAILY
Status: DISCONTINUED | OUTPATIENT
Start: 2022-07-11 | End: 2022-07-11 | Stop reason: HOSPADM

## 2022-07-11 RX ORDER — CLONIDINE HYDROCHLORIDE 0.1 MG/1
0.3 TABLET ORAL 2 TIMES DAILY
Status: DISCONTINUED | OUTPATIENT
Start: 2022-07-11 | End: 2022-07-11

## 2022-07-11 RX ORDER — OXYCODONE HYDROCHLORIDE 10 MG/1
10 TABLET ORAL
Status: DISCONTINUED | OUTPATIENT
Start: 2022-07-11 | End: 2022-07-11 | Stop reason: HOSPADM

## 2022-07-11 RX ORDER — OMEPRAZOLE 20 MG/1
20 CAPSULE, DELAYED RELEASE ORAL 2 TIMES DAILY
Status: DISCONTINUED | OUTPATIENT
Start: 2022-07-11 | End: 2022-07-11 | Stop reason: HOSPADM

## 2022-07-11 RX ORDER — DEXTROSE MONOHYDRATE 25 G/50ML
25 INJECTION, SOLUTION INTRAVENOUS ONCE
Status: COMPLETED | OUTPATIENT
Start: 2022-07-11 | End: 2022-07-11

## 2022-07-11 RX ORDER — PANTOPRAZOLE SODIUM 40 MG/1
40 TABLET, DELAYED RELEASE ORAL 2 TIMES DAILY
Status: DISCONTINUED | OUTPATIENT
Start: 2022-07-11 | End: 2022-07-11

## 2022-07-11 RX ORDER — AMOXICILLIN 250 MG
2 CAPSULE ORAL 2 TIMES DAILY
Status: DISCONTINUED | OUTPATIENT
Start: 2022-07-11 | End: 2022-07-11 | Stop reason: HOSPADM

## 2022-07-11 RX ORDER — ATENOLOL 25 MG/1
25 TABLET ORAL DAILY
Status: DISCONTINUED | OUTPATIENT
Start: 2022-07-11 | End: 2022-07-11 | Stop reason: HOSPADM

## 2022-07-11 RX ORDER — HYDROMORPHONE HYDROCHLORIDE 1 MG/ML
0.5 INJECTION, SOLUTION INTRAMUSCULAR; INTRAVENOUS; SUBCUTANEOUS ONCE
Status: COMPLETED | OUTPATIENT
Start: 2022-07-11 | End: 2022-07-11

## 2022-07-11 RX ORDER — HYDROMORPHONE HYDROCHLORIDE 1 MG/ML
0.5 INJECTION, SOLUTION INTRAMUSCULAR; INTRAVENOUS; SUBCUTANEOUS
Status: DISCONTINUED | OUTPATIENT
Start: 2022-07-11 | End: 2022-07-11 | Stop reason: HOSPADM

## 2022-07-11 RX ORDER — ONDANSETRON 4 MG/1
4 TABLET, ORALLY DISINTEGRATING ORAL EVERY 4 HOURS PRN
Status: DISCONTINUED | OUTPATIENT
Start: 2022-07-11 | End: 2022-07-11 | Stop reason: HOSPADM

## 2022-07-11 RX ADMIN — AMLODIPINE BESYLATE 5 MG: 5 TABLET ORAL at 05:13

## 2022-07-11 RX ADMIN — HYDROXYCHLOROQUINE SULFATE 200 MG: 200 TABLET, FILM COATED ORAL at 05:13

## 2022-07-11 RX ADMIN — HYDROMORPHONE HYDROCHLORIDE 2 MG: 2 TABLET ORAL at 03:34

## 2022-07-11 RX ADMIN — HYDROMORPHONE HYDROCHLORIDE 0.5 MG: 1 INJECTION, SOLUTION INTRAMUSCULAR; INTRAVENOUS; SUBCUTANEOUS at 14:17

## 2022-07-11 RX ADMIN — HEPARIN SODIUM 1500 UNITS: 1000 INJECTION, SOLUTION INTRAVENOUS; SUBCUTANEOUS at 10:02

## 2022-07-11 RX ADMIN — PANTOPRAZOLE SODIUM 40 MG: 40 INJECTION, POWDER, FOR SOLUTION INTRAVENOUS at 01:59

## 2022-07-11 RX ADMIN — CLONIDINE HYDROCHLORIDE 0.3 MG: 0.1 TABLET ORAL at 05:12

## 2022-07-11 RX ADMIN — ATENOLOL 25 MG: 25 TABLET ORAL at 05:12

## 2022-07-11 RX ADMIN — ACETAMINOPHEN 650 MG: 325 TABLET ORAL at 10:45

## 2022-07-11 RX ADMIN — HYDROMORPHONE HYDROCHLORIDE 0.5 MG: 1 INJECTION, SOLUTION INTRAMUSCULAR; INTRAVENOUS; SUBCUTANEOUS at 00:34

## 2022-07-11 RX ADMIN — HYDROMORPHONE HYDROCHLORIDE 0.5 MG: 1 INJECTION, SOLUTION INTRAMUSCULAR; INTRAVENOUS; SUBCUTANEOUS at 10:00

## 2022-07-11 RX ADMIN — ONDANSETRON 4 MG: 2 INJECTION INTRAMUSCULAR; INTRAVENOUS at 00:35

## 2022-07-11 RX ADMIN — CALCIUM GLUCONATE 2 G: 20 INJECTION, SOLUTION INTRAVENOUS at 02:52

## 2022-07-11 RX ADMIN — OMEPRAZOLE 20 MG: 20 CAPSULE, DELAYED RELEASE ORAL at 05:12

## 2022-07-11 RX ADMIN — SENNOSIDES AND DOCUSATE SODIUM 2 TABLET: 50; 8.6 TABLET ORAL at 05:12

## 2022-07-11 RX ADMIN — DEXTROSE MONOHYDRATE 25 G: 25 INJECTION, SOLUTION INTRAVENOUS at 02:52

## 2022-07-11 RX ADMIN — INSULIN HUMAN 9.5 UNITS: 100 INJECTION, SOLUTION PARENTERAL at 02:55

## 2022-07-11 RX ADMIN — DIPHENHYDRAMINE HYDROCHLORIDE 12.5 MG: 50 INJECTION, SOLUTION INTRAMUSCULAR; INTRAVENOUS at 10:45

## 2022-07-11 ASSESSMENT — ENCOUNTER SYMPTOMS
SPUTUM PRODUCTION: 0
BACK PAIN: 1
DEPRESSION: 0
COUGH: 0
HEADACHES: 1
FEVER: 0
ABDOMINAL PAIN: 0
TINGLING: 0
VOMITING: 0
DIARRHEA: 0
CHILLS: 0
PALPITATIONS: 0
DIZZINESS: 0
WEAKNESS: 1
MYALGIAS: 0
STRIDOR: 0
FALLS: 0
SHORTNESS OF BREATH: 0
LOSS OF CONSCIOUSNESS: 0
CONSTIPATION: 0
NAUSEA: 0

## 2022-07-11 ASSESSMENT — FIBROSIS 4 INDEX
FIB4 SCORE: .7058823529411764706
FIB4 SCORE: .7058823529411764706

## 2022-07-11 ASSESSMENT — PATIENT HEALTH QUESTIONNAIRE - PHQ9
SUM OF ALL RESPONSES TO PHQ9 QUESTIONS 1 AND 2: 0
1. LITTLE INTEREST OR PLEASURE IN DOING THINGS: NOT AT ALL
2. FEELING DOWN, DEPRESSED, IRRITABLE, OR HOPELESS: NOT AT ALL

## 2022-07-11 ASSESSMENT — LIFESTYLE VARIABLES
ON A TYPICAL DAY WHEN YOU DRINK ALCOHOL HOW MANY DRINKS DO YOU HAVE: 0
EVER HAD A DRINK FIRST THING IN THE MORNING TO STEADY YOUR NERVES TO GET RID OF A HANGOVER: NO
AVERAGE NUMBER OF DAYS PER WEEK YOU HAVE A DRINK CONTAINING ALCOHOL: 0
HOW MANY TIMES IN THE PAST YEAR HAVE YOU HAD 5 OR MORE DRINKS IN A DAY: 0
HAVE PEOPLE ANNOYED YOU BY CRITICIZING YOUR DRINKING: NO
TOTAL SCORE: 0
EVER FELT BAD OR GUILTY ABOUT YOUR DRINKING: NO
TOTAL SCORE: 0
ALCOHOL_USE: NO
CONSUMPTION TOTAL: NEGATIVE
TOTAL SCORE: 0
HAVE YOU EVER FELT YOU SHOULD CUT DOWN ON YOUR DRINKING: NO

## 2022-07-11 ASSESSMENT — PAIN DESCRIPTION - PAIN TYPE: TYPE: ACUTE PAIN

## 2022-07-11 NOTE — PROGRESS NOTES
[facility] Progress Note     Hemodialysis treatment x 3 hours completed as ordered per Dr. Najjar  Treatment started at 1010 and ended at 1311       Net UF Removed:  3,000 mL     Patient tolerated treatment well. UF goal reached.  Patient stable during and post treatment, no complaints.   1 unit PRBC transfused during treatment-premedicated by PCN prior to transfusion, no blood transfusion reactions noted during and post transfusion  See Acute HD paper flow sheets for details.     Post tx access: Cannulation needles removed from CARLENE AVF access sites, hemostasis established on each insertion site; verified no bleeding. (+) bruit/thrill post treatment. Covered with clean gauze dressings and secured with tape.     Please monitor for AVF access bleeding. Should any breakthrough bleeding occur, please apply gauze and firm pressure on site until bleeding resolves, cover with gauze dressing and tape. Please observe RUE precautions- NO taking of BPs or blood draws to access extremity please.    Please notify Nephrologist/Dialysis for follow-up.    Report given to primary care nurse SATISH John RN.

## 2022-07-11 NOTE — ASSESSMENT & PLAN NOTE
- Significant hyponatremia, will likely need multiple sessions of hemodialysis to correct this  -I do not however see signs of significant fluid overload  -Fluid restriction to 1500 mL  -Repeat BMP in the morning

## 2022-07-11 NOTE — PROGRESS NOTES
Telemetry Shift Summary     Rhythm: SR  HR: 81  Ectopy: none    Measurements: 0.18/0.08/0.34    Normal Values  Rhythm: SR  HR:   Measurements: 0.12-0.20/0.08-0.10/0.30-0.52

## 2022-07-11 NOTE — ED PROVIDER NOTES
ED Provider Note  ED Provider Note    Scribed for Milind Dotson by iMlind Dotson. 7/10/2022  11:49 PM    Primary care provider: Ella Matthew M.D.  Means of arrival: EMS  History obtained from: Patient  History limited by: None    CHIEF COMPLAINT  Chief Complaint   Patient presents with   • Back Pain     Back pain, headache pain progressively worse today.  Took dilaudid yesterday but not today because she is feeling weak.  She is concerned for low hgb.  Dialysis last Friday, gets dialysis tomorrow.       HPI  Lily Nicole is a 26 y.o. female who presents to the Emergency Department with generalized weakness, feeling like her anemia is worsening.  History of lupus, end-stage renal disease on hemodialysis Monday, Wednesday, Friday with Dr. Arnett, chronic respiratory failure on a baseline of 3 to 4 L of nasal cannula oxygen.  She denies any concomitant symptoms of weakness, no chest pain, no increase shortness of breath, no abdominal pain, nausea, vomiting or diarrhea, no black or tarry stools, dysuria or hematuria.  Denies any recent fevers or chills.  Review of systems otherwise negative.  She has required multiple blood transfusions in the emergency department previously.  Quality: Weakness  Duration: 3 days  Severity: Mild to moderate  Associated sx: None    REVIEW OF SYSTEMS  As above, all other systems reviewed and are negative.   See HPI for further details.     PAST MEDICAL HISTORY   has a past medical history of Anemia (01/17/2018), AVF (arteriovenous fistula) ([facility]), Chest pain, Chest tightness, Daytime sleepiness, Dialysis patient ([facility]), Difficulty breathing, ESRD (end stage renal disease) on dialysis ([facility]) (01/17/2018), Gasping for breath, Heart burn, Hypertension (01/17/2018), Indigestion, Lupus ([facility]), Migraines (01/17/2018), Painful breathing, Palpitations, Seizure ([facility]) (2013), Shortness of breath, Swelling of lower extremity, and Wheezing.  SURGICAL HISTORY   has a past surgical  history that includes ronak by laparoscopy (4/5/2010); av fistula creation (Right); angioplasty (01/17/2018); other; other; gastroscopy-endo (12/9/2019); gastroscopy (N/A, 5/30/2020); gastroscopy-endo (9/18/2020); upper gi endoscopy,ctrl bleed (11/12/2020); upper gi endoscopy,biopsy (11/12/2020); gastroscopy-endo (11/12/2020); colonoscopy,diagnostic (1/8/2021); upper gi endoscopy,diagnosis (1/8/2021); upper gi endoscopy,ctrl bleed (1/8/2021); upper gi endoscopy,diagnosis (3/5/2021); upper gi endoscopy,diagnosis (N/A, 3/19/2021); upper gi endoscopy,ctrl bleed (3/19/2021); and bronchoscopy,diagnostic (Bilateral, 5/13/2021).  SOCIAL HISTORY  Social History     Tobacco Use   • Smoking status: Never Smoker   • Smokeless tobacco: Never Used   Vaping Use   • Vaping Use: Never used   Substance Use Topics   • Alcohol use: No   • Drug use: No      Social History     Substance and Sexual Activity   Drug Use No     FAMILY HISTORY  Family History   Problem Relation Age of Onset   • Diabetes Paternal Grandmother      CURRENT MEDICATIONS  Home Medications     Reviewed by Shannon Adams R.N. (Registered Nurse) on 07/10/22 at 2350  Med List Status: Partial   Medication Last Dose Status   acetaminophen (TYLENOL) 500 MG Tab  Active   amLODIPine (NORVASC) 5 MG Tab  Active   atenolol (TENORMIN) 25 MG Tab  Active   cloNIDine (CATAPRES) 0.2 MG/24HR PATCH WEEKLY patch  Active   cloNIDine (CATAPRES) 0.3 MG Tab  Active   HYDROmorphone (DILAUDID) 2 MG Tab 7/9/2022 Active   hydroxychloroquine (PLAQUENIL) 200 MG Tab  Active   mycophenolate sodium (MYFORTIC) 360 MG Tablet Delayed Response tablet  Active   ondansetron (ZOFRAN ODT) 4 MG TABLET DISPERSIBLE  Active   pantoprazole (PROTONIX) 40 MG Tablet Delayed Response  Active   predniSONE (DELTASONE) 5 MG Tab  Active              ALLERGIES  Allergies   Allergen Reactions   • Cephalexin Rash     Nausea and rash   Hives  .   • Clindamycin Rash     Nausea and rash     Hive  .   • Methylprednisolone  "     Anxious  Other reaction(s): Other-Reaction in Comments  Anxious    Tolerates prednisone    • Metoprolol Rash and Nausea     Nausea and rash     Tolerates antenalol   • Compazine      C/o anxiety   • Fentanyl And Related Anxiety   • Hydrocodone-Acetaminophen Rash and Nausea     Generalized rash  Generalized rash    Tolerates acetaminophen  Tolerates hydromorphone   • Maxipime [Cefepime] Itching   • Reglan [Metoclopramide] Anxiety   • Tape Rash     Paper tape is ok       PHYSICAL EXAM    VITAL SIGNS:   Vitals:    07/10/22 2347 07/10/22 2349 07/11/22 0124   BP:  123/80 124/81   Pulse:  86 76   Resp:  16 18   Temp:  37.6 °C (99.7 °F)    TempSrc:  Temporal    SpO2:  100% 100%   Weight: 52.2 kg (115 lb)     Height: 1.702 m (5' 7\")         Vitals: My interpretation: normotensive, not tachycardic, afebrile, not hypoxic    Reinterpretation of vitals: Unchanged    Cardiac Monitor Interpretation: The cardiac monitor revealed normal Sinus Rhythm as interpreted by me. The cardiac monitor was ordered secondary to the patient's history of anemia and to monitor for dysrhythmia and/or tachycardia.    PE:   Constitutional: Well developed, Well nourished, No acute distress, Non-toxic appearance.   HENT: Normocephalic, Atraumatic, Bilateral external ears normal, Oropharynx is clear mucous membranes are moist. No oral exudates or nasal discharge.   Eyes: Pupils are equal round and reactive, EOMI, Conjunctiva normal, No discharge.   Neck: Normal range of motion, No tenderness, Supple, No stridor. No meningismus.  Lymphatic: No lymphadenopathy noted.   Cardiovascular: Regular rate and rhythm without murmur rub or gallop.  Thorax & Lungs: Clear breath sounds bilaterally without wheezes, rhonchi or rales. There is no chest wall tenderness.   Abdomen: Soft non-tender non-distended. There is no rebound or guarding. No organomegaly is appreciated. Bowel sounds are normal.  Skin: Normal without rash.   Back: No CVA or spinal tenderness. "   Extremities: Intact distal pulses, No edema, No tenderness, No cyanosis, No clubbing. Capillary refill is less than 2 seconds.  Musculoskeletal: Good range of motion in all major joints. No tenderness to palpation or major deformities noted.   Neurologic: Alert & oriented x 3, Normal motor function, Normal sensory function, No focal deficits noted. Reflexes are normal.  Psychiatric: Affect normal, Judgment normal, Mood normal. There is no suicidal ideation or patient reported hallucinations.     DIAGNOSTIC STUDIES / PROCEDURES    LABS  Results for orders placed or performed during the hospital encounter of 07/10/22   CBC WITH DIFFERENTIAL   Result Value Ref Range    WBC 6.6 4.8 - 10.8 K/uL    RBC 2.92 (L) 4.20 - 5.40 M/uL    Hemoglobin 7.6 (L) 12.0 - 16.0 g/dL    Hematocrit 25.1 (L) 37.0 - 47.0 %    MCV 86.0 81.4 - 97.8 fL    MCH 26.0 (L) 27.0 - 33.0 pg    MCHC 30.3 (L) 33.6 - 35.0 g/dL    RDW 61.1 (H) 35.9 - 50.0 fL    Platelet Count 238 164 - 446 K/uL    MPV 10.4 9.0 - 12.9 fL    Neutrophils-Polys 87.20 (H) 44.00 - 72.00 %    Lymphocytes 8.80 (L) 22.00 - 41.00 %    Monocytes 2.90 0.00 - 13.40 %    Eosinophils 0.50 0.00 - 6.90 %    Basophils 0.30 0.00 - 1.80 %    Immature Granulocytes 0.30 0.00 - 0.90 %    Nucleated RBC 0.00 /100 WBC    Neutrophils (Absolute) 5.74 2.00 - 7.15 K/uL    Lymphs (Absolute) 0.58 (L) 1.00 - 4.80 K/uL    Monos (Absolute) 0.19 0.00 - 0.85 K/uL    Eos (Absolute) 0.03 0.00 - 0.51 K/uL    Baso (Absolute) 0.02 0.00 - 0.12 K/uL    Immature Granulocytes (abs) 0.02 0.00 - 0.11 K/uL    NRBC (Absolute) 0.00 K/uL   COMP METABOLIC PANEL   Result Value Ref Range    Sodium 120 (L) 135 - 145 mmol/L    Potassium 5.7 (H) 3.6 - 5.5 mmol/L    Chloride 81 (L) 96 - 112 mmol/L    Co2 25 20 - 33 mmol/L    Anion Gap 14.0 7.0 - 16.0    Glucose 92 65 - 99 mg/dL    Bun 52 (H) 8 - 22 mg/dL    Creatinine 6.49 (HH) 0.50 - 1.40 mg/dL    Calcium 8.1 (L) 8.4 - 10.2 mg/dL    AST(SGOT) 21 12 - 45 U/L    ALT(SGPT) 9 2 - 50  U/L    Alkaline Phosphatase 87 30 - 99 U/L    Total Bilirubin 0.4 0.1 - 1.5 mg/dL    Albumin 2.5 (L) 3.2 - 4.9 g/dL    Total Protein 8.5 (H) 6.0 - 8.2 g/dL    Globulin 6.0 (H) 1.9 - 3.5 g/dL    A-G Ratio 0.4 g/dL   TROPONIN   Result Value Ref Range    Troponin T 738 (H) 6 - 19 ng/L   PROTHROMBIN TIME (INR)   Result Value Ref Range    PT 16.0 (H) 12.0 - 14.6 sec    INR 1.34 (H) 0.87 - 1.13   APTT   Result Value Ref Range    APTT 36.4 (H) 24.7 - 36.0 sec   COD (ADULT)   Result Value Ref Range    ABO Grouping Only O     Rh Grouping Only POS     Antibody Screen-Cod NEG    LACTIC ACID   Result Value Ref Range    Lactic Acid 1.9 0.5 - 2.0 mmol/L   CoV-2, FLU A/B, and RSV by PCR (2-4 Hours CEPHEID) : Collect NP swab in VTM    Specimen: Respirate   Result Value Ref Range    Influenza virus A RNA Negative Negative    Influenza virus B, PCR Negative Negative    RSV, PCR Negative Negative    SARS-CoV-2 by PCR NotDetected     SARS-CoV-2 Source NP Swab    ESTIMATED GFR   Result Value Ref Range    GFR (CKD-EPI) 9 (A) >60 mL/min/1.73 m 2   EKG (NOW)   Result Value Ref Range    Report       [facility] Emergency Dept.    Test Date:  2022-07-10  Pt Name:    CASE WOODSON            Department: [facility]  MRN:        [id number]                      [facility]  Gender:     Female                       Technician: ROSALIO  :        [id number]                   Requested By:TEVIN DOTSON  Order #:    [id number]                    Reading MD: Tevin Dotson    Measurements  Intervals                                Axis  Rate:       80                           P:          45  WY:         164                          QRS:        -21  QRSD:       86                           T:          125  QT:         365  QTc:        421    Interpretive Statements  Sinus rhythm  LVH with secondary repolarization abnormality  Compared to ECG 2022 22:01:16  No significant changes  Electronically Signed On  7- 0:16:31 PDT by Milind Dotson        All labs reviewed by me. Significant for Nolex ptosis, chronic anemia, hyponatremia, hyperkalemia, hypochloremia, acute on chronic renal failure, no transaminitis, normal bilirubin, troponin of 738, lactic acid normal    RADIOLOGY  DX-CHEST-PORTABLE (1 VIEW)   Final Result      Stable cardiomegaly. No acute cardiopulmonary abnormality.           The radiologist's interpretation of all radiological studies have been reviewed by me.    COURSE & MEDICAL DECISION MAKING  Nursing notes, VS, PMSFHx, labs, imaging, EKG reviewed in chart.    Heart Score: High    MDM: 11:49 PM Lily Nicole is a 26 y.o. female who presented with generalized weakness, acute on chronic back pain.  Patient is unfortunately severely ill with chronic renal failure, anemia of chronic disease, secondary to lupus.  She is here frequently and requires blood transfusions on a regular basis.  Exam is at baseline, has some mild reproducible back pain.  Labs are drawn to evaluate whether she would benefit from treatment of symptomatic anemia.  Her hemoglobin is 7.6.  Her CMP is concerning for new hyponatremia of 120, hyperkalemia 5.7 without EKG changes and chronic renal failure.  Troponin is 738 which is near her baseline the last 6 evaluations.  The rest of her labs are fairly unremarkable.  Considering her hyponatremia and need for blood transfusion for symptomatic anemia she was admitted to the hospitalist for further evaluation and treatment.  She will receive dialysis tomorrow which should help with her electrolyte derangements.  Chest x-ray is unremarkable.    CRITICAL CARE TIME 41 minutes: Symptomatic anemia, NSTEMI, hyponatremia, hyperkalemia  There was a very real possibility of deterioration of the patient's condition.  This patient required the highest level of care.  I provided critical care services which included: review of the medical record, treatment orders, ordering and reviewing  test results, frequent reevaluation of the patient's condition and response to treatment, as well as discussing the case with appropriate personnel and various consultants. The critical care time associated with the care of this patient is exclusive of any procedures or specific interventions.    Peripheral Venous Access with US Guidance CPT Code 86962,  Limited Diagnostic Exam: Venipuncture requiring physician skill with ultrasound guidance  Performed by Self  Patient Identity confirmed: Yes  Risks, benefits and alternatives were discussed  Consent given by: Patient   Indication for Exam: Vascular Access   Vein/Location: Left basilic  Normal Compressibility and Visualized Patency   Catheter Size: 20 gauge   Number of Attempts: 1   Successful Catheter Insertion: Yes   Complications: None     FINAL IMPRESSION  1. Hyponatremia Acute   2. Anemia, chronic disease Acute   3. NSTEMI (non-ST elevated myocardial infarction) (HCC) Acute   4. Hyperkalemia Acute   5. Stage 5 chronic kidney disease on chronic dialysis (HCC) Acute     The note accurately reflects work and decisions made by me.  Milind Dotson  7/10/2022  11:50 PM

## 2022-07-11 NOTE — DISCHARGE SUMMARY
"Discharge Summary    CHIEF COMPLAINT ON ADMISSION  Chief Complaint   Patient presents with   • Back Pain     Back pain, headache pain progressively worse today.  Took dilaudid yesterday but not today because she is feeling weak.  She is concerned for low hgb.  Dialysis last Friday, gets dialysis tomorrow.         Reason for Admission  EMS     Admission Date  7/10/2022    CODE STATUS  Full Code    HPI & HOSPITAL COURSE  Per notes, \"26 y.o. female who presented 7/10/2022 with weakness.  Patient states over the last 3 days she has noted worsening weakness.  She states it feels similar to previous episodes of worsening anemia.  She does have a history of lupus causing end-stage renal disease with hemodialysis Monday, Wednesday and Friday, states she has been compliant with hemodialysis.  States she had no issues in her hemodialysis session on Friday.  She also complains of some back pain as well as a headache that is mild in severity and an ache.  I did discuss the case including labs and imaging with the ER physician.\"    Patient was admitted and evaluated for hyponatremia and worsening weakness.  She did have hyponatremia on admission, and this is likely been contributing to her weakness.  Additionally she was found to be borderline for anemia, she was transfused 1 unit RBC.  She has received hemodialysis on the morning of 7/11.  Improvement in overall symptoms and at this point I do think she is safe to be discharged and managed in the outpatient setting.  I recommended she follow-up closely with her PCP and nephrologist.    Therefore, she is discharged in good and stable condition to home with close outpatient follow-up.    The patient recovered much more quickly than anticipated on admission.    Discharge Date  7/11/2022    FOLLOW UP ITEMS POST DISCHARGE  Follow-up with neurology  Follow-up with PCP    DISCHARGE DIAGNOSES  Principal Problem:    Hyponatremia POA: Yes  Active Problems:    ESRD (end stage renal " "disease) on dialysis (HCC) POA: Yes    HTN (hypertension) POA: Yes      Overview: \"Controlled with medication\"    Lupus (HCC) POA: Yes    Elevated troponin chronic (Chronic) POA: Yes    Hyperkalemia POA: Yes    Anemia due to chronic kidney disease POA: Yes    Gastroesophageal reflux disease without esophagitis POA: Yes    Back pain POA: Yes    Chronic respiratory failure with hypoxia (HCC) POA: Yes  Resolved Problems:    * No resolved hospital problems. *      FOLLOW UP  Future Appointments   Date Time Provider Department Center   7/19/2022  1:00 PM Edna Dahl PsyD [facility] [address]   8/2/2022  2:00 PM Edna Dahl PsyD [facility] [address]   11/3/2022  1:00 PM TERRI Nava None     No follow-up provider specified.    MEDICATIONS ON DISCHARGE     Medication List      CONTINUE taking these medications      Instructions   amLODIPine 5 MG Tabs  Commonly known as: NORVASC   Take 5 mg by mouth every day.  Dose: 5 mg     atenolol 25 MG Tabs  Commonly known as: TENORMIN   Take 25 mg by mouth every day.  Dose: 25 mg     cloNIDine 0.2 MG/24HR Ptwk patch  Commonly known as: CATAPRES   Place 1 Patch on the skin every 7 days.  Dose: 1 Patch     cloNIDine 0.3 MG Tabs  Commonly known as: CATAPRES   Take 0.3 mg by mouth 2 times a day.  Dose: 0.3 mg     HYDROmorphone 2 MG Tabs  Commonly known as: DILAUDID   Take 2 mg by mouth 1 time a day as needed for Severe Pain.  Dose: 2 mg     hydroxychloroquine 200 MG Tabs  Commonly known as: Plaquenil   Take 200 mg by mouth every day.  Dose: 200 mg     mycophenolate sodium 360 MG Tbec tablet  Commonly known as: MYFORTIC   Take 360 mg by mouth every evening.  Dose: 360 mg     ondansetron 4 MG Tbdp  Commonly known as: ZOFRAN ODT   Take 4 mg by mouth every 6 hours as needed for Nausea.  Dose: 4 mg     pantoprazole 40 MG Tbec  Commonly known as: PROTONIX   Take 1 tablet by mouth 2 times a day.  Dose: 40 mg     predniSONE 5 MG Tabs  Commonly known as: DELTASONE   Take 5 " mg by mouth every evening.  Dose: 5 mg            Allergies  Allergies   Allergen Reactions   • Cephalexin Rash     Nausea and rash   Hives  .   • Clindamycin Rash     Nausea and rash     Hive  .   • Methylprednisolone      Anxious  Other reaction(s): Other-Reaction in Comments  Anxious    Tolerates prednisone    • Metoprolol Rash and Nausea     Nausea and rash     Tolerates antenalol   • Compazine      C/o anxiety   • Fentanyl And Related Anxiety   • Hydrocodone-Acetaminophen Rash and Nausea     Generalized rash  Generalized rash    Tolerates acetaminophen  Tolerates hydromorphone   • Maxipime [Cefepime] Itching   • Reglan [Metoclopramide] Anxiety   • Tape Rash     Paper tape is ok       DIET  Orders Placed This Encounter   Procedures   • Diet Order Diet: Renal; Fluid modifications: (optional): 1500 ml Fluid Restriction     Standing Status:   Standing     Number of Occurrences:   1     Order Specific Question:   Diet:     Answer:   Renal [8]     Order Specific Question:   Fluid modifications: (optional)     Answer:   1500 ml Fluid Restriction [9]       ACTIVITY  As tolerated.  Weight bearing as tolerated    CONSULTATIONS  Nephrology     PROCEDURES  HD    LABORATORY  Lab Results   Component Value Date    SODIUM 121 (L) 07/11/2022    POTASSIUM 5.1 07/11/2022    CHLORIDE 82 (L) 07/11/2022    CO2 25 07/11/2022    GLUCOSE 71 07/11/2022    BUN 59 (H) 07/11/2022    CREATININE 6.76 (HH) 07/11/2022        Lab Results   Component Value Date    WBC 7.5 07/11/2022    HEMOGLOBIN 7.2 (L) 07/11/2022    HEMATOCRIT 24.2 (L) 07/11/2022    PLATELETCT 229 07/11/2022        Total time of the discharge process exceeds 45 minutes.

## 2022-07-11 NOTE — ASSESSMENT & PLAN NOTE
- Does seem worse than her baseline but she denies any chest pain  -I do not think there is an acute event however I will trend troponin  -Monitor patient on telemetry

## 2022-07-11 NOTE — ED NOTES
Tech from the lab called with a critical lab of trop of 738, lab results read back to Tech, results given to Dr. Dotson.

## 2022-07-11 NOTE — ASSESSMENT & PLAN NOTE
- ERP has ordered 1 unit to be given  -Anemia is chronic due to her renal failure and lupus  -No sign of gross bleeding  -Repeat CBC in the morning

## 2022-07-11 NOTE — DISCHARGE PLANNING
Outpatient Dialysis Note    Confirmed patient is active at:      [facility]  [address] [place] [id number]      Schedule: Monday, Wednesday, Friday   Time: 6:15AM    Patient is seen by Dr. Trent in HD clinic.    Spoke with Viki at facility who confirmed.    Forwarded records for review.    Mary Han- Senior Dialysis Coordinator # [phone]  [employer]

## 2022-07-11 NOTE — ED NOTES
Per Lab, [facility] must [facility] to [facility] do [facility] match which will take 3-4 hours, then we will get blood to transfuse patient.

## 2022-07-11 NOTE — ED TRIAGE NOTES
"Chief Complaint   Patient presents with   • Back Pain     Back pain, headache pain progressively worse today.  Took dilaudid yesterday but not today because she is feeling weak.  She is concerned for low hgb.  Dialysis last Friday, gets dialysis tomorrow.         /80   Pulse 86   Temp 37.6 °C (99.7 °F) (Temporal)   Resp 16   Ht 1.702 m (5' 7\")   Wt 52.2 kg (115 lb)   LMP  (LMP Unknown)   SpO2 100%   BMI 18.01 kg/m²     Not covid vaccinated, postponed.  "

## 2022-07-11 NOTE — H&P
Hospital Medicine History & Physical Note    Date of Service  7/11/2022    Primary Care Physician  Ella Matthew M.D.    Consultants  None    Specialist Names: None    Code Status  Full Code    Chief Complaint  Chief Complaint   Patient presents with   • Back Pain     Back pain, headache pain progressively worse today.  Took dilaudid yesterday but not today because she is feeling weak.  She is concerned for low hgb.  Dialysis last Friday, gets dialysis tomorrow.         History of Presenting Illness  Lily Nicole is a 26 y.o. female who presented 7/10/2022 with weakness.  Patient states over the last 3 days she has noted worsening weakness.  She states it feels similar to previous episodes of worsening anemia.  She does have a history of lupus causing end-stage renal disease with hemodialysis Monday, Wednesday and Friday, states she has been compliant with hemodialysis.  States she had no issues in her hemodialysis session on Friday.  She also complains of some back pain as well as a headache that is mild in severity and an ache.  I did discuss the case including labs and imaging with the ER physician.    I discussed the plan of care with patient.    Review of Systems  Review of Systems   Constitutional: Positive for malaise/fatigue. Negative for chills and fever.   HENT: Negative for congestion.    Respiratory: Negative for cough, sputum production, shortness of breath and stridor.    Cardiovascular: Negative for chest pain, palpitations and leg swelling.   Gastrointestinal: Negative for abdominal pain, constipation, diarrhea, nausea and vomiting.   Genitourinary: Negative for dysuria and urgency.   Musculoskeletal: Positive for back pain. Negative for falls and myalgias.   Neurological: Positive for weakness and headaches. Negative for dizziness, tingling and loss of consciousness.   Psychiatric/Behavioral: Negative for depression and suicidal ideas.   All other systems reviewed and are  negative.      Past Medical History   has a past medical history of Anemia (01/17/2018), AVF (arteriovenous fistula) ([facility]), Chest pain, Chest tightness, Daytime sleepiness, Dialysis patient ([facility]), Difficulty breathing, ESRD (end stage renal disease) on dialysis ([facility]) (01/17/2018), Gasping for breath, Heart burn, Hypertension (01/17/2018), Indigestion, Lupus ([facility]), Migraines (01/17/2018), Painful breathing, Palpitations, Seizure ([facility]) (2013), Shortness of breath, Swelling of lower extremity, and Wheezing.    Surgical History   has a past surgical history that includes ronak by laparoscopy (4/5/2010); av fistula creation (Right); angioplasty (01/17/2018); other; other; gastroscopy-endo (12/9/2019); gastroscopy (N/A, 5/30/2020); gastroscopy-endo (9/18/2020); pr upper gi endoscopy,ctrl bleed (11/12/2020); pr upper gi endoscopy,biopsy (11/12/2020); gastroscopy-endo (11/12/2020); pr colonoscopy,diagnostic (1/8/2021); pr upper gi endoscopy,diagnosis (1/8/2021); pr upper gi endoscopy,ctrl bleed (1/8/2021); pr upper gi endoscopy,diagnosis (3/5/2021); pr upper gi endoscopy,diagnosis (N/A, 3/19/2021); pr upper gi endoscopy,ctrl bleed (3/19/2021); and pr bronchoscopy,diagnostic (Bilateral, 5/13/2021).     Family History  family history includes Diabetes in her paternal grandmother.   Family history reviewed with patient. There is no family history that is pertinent to the chief complaint.     Social History   reports that she has never smoked. She has never used smokeless tobacco. She reports that she does not drink alcohol and does not use drugs.    Allergies  Allergies   Allergen Reactions   • Cephalexin Rash     Nausea and rash   Hives  .   • Clindamycin Rash     Nausea and rash     Hive  .   • Methylprednisolone      Anxious  Other reaction(s): Other-Reaction in Comments  Anxious    Tolerates prednisone    • Metoprolol Rash and Nausea     Nausea and rash     Tolerates antenalol   • Compazine      C/o anxiety   • Fentanyl  And Related Anxiety   • Hydrocodone-Acetaminophen Rash and Nausea     Generalized rash  Generalized rash    Tolerates acetaminophen  Tolerates hydromorphone   • Maxipime [Cefepime] Itching   • Reglan [Metoclopramide] Anxiety   • Tape Rash     Paper tape is ok       Medications  Prior to Admission Medications   Prescriptions Last Dose Informant Patient Reported? Taking?   HYDROmorphone (DILAUDID) 2 MG Tab 7/9/2022 at Unknown time Patient Yes No   Sig: Take 2 mg by mouth 1 time a day as needed for Severe Pain.   acetaminophen (TYLENOL) 500 MG Tab  Patient Yes No   Sig: Take 500-1,000 mg by mouth every 6 hours as needed. Indications: Pain   amLODIPine (NORVASC) 5 MG Tab  Patient Yes No   Sig: Take 5 mg by mouth every day.   atenolol (TENORMIN) 25 MG Tab  Patient Yes No   Sig: Take 25 mg by mouth every day.   cloNIDine (CATAPRES) 0.2 MG/24HR PATCH WEEKLY patch  Patient Yes No   Sig: Place 1 Patch on the skin every 7 days.   cloNIDine (CATAPRES) 0.3 MG Tab  Patient Yes No   Sig: Take 0.3 mg by mouth 2 times a day.   hydroxychloroquine (PLAQUENIL) 200 MG Tab  Patient Yes No   Sig: Take 200 mg by mouth every day.   mycophenolate sodium (MYFORTIC) 360 MG Tablet Delayed Response tablet  Patient Yes No   Sig: Take 360 mg by mouth every evening.   ondansetron (ZOFRAN ODT) 4 MG TABLET DISPERSIBLE  Patient Yes No   Sig: Take 4 mg by mouth every 6 hours as needed for Nausea.   pantoprazole (PROTONIX) 40 MG Tablet Delayed Response  Patient No No   Sig: Take 1 tablet by mouth 2 times a day.   predniSONE (DELTASONE) 5 MG Tab  Patient Yes No   Sig: Take 5 mg by mouth every evening.      Facility-Administered Medications: None       Physical Exam  Temp:  [35.9 °C (96.7 °F)-37.6 °C (99.7 °F)] 35.9 °C (96.7 °F)  Pulse:  [76-86] 76  Resp:  [16-18] 18  BP: (123-124)/(80-81) 124/81  SpO2:  [100 %] 100 %  Blood Pressure: 124/81   Temperature: 35.9 °C (96.7 °F)   Pulse: 76   Respiration: 18   Pulse Oximetry: 100 %       Physical Exam  Vitals  and nursing note reviewed.   Constitutional:       General: She is not in acute distress.     Appearance: She is well-developed. She is ill-appearing. She is not diaphoretic.   HENT:      Head: Normocephalic and atraumatic.      Right Ear: External ear normal.      Left Ear: External ear normal.      Nose: Nose normal. No congestion or rhinorrhea.      Mouth/Throat:      Mouth: Mucous membranes are moist.      Pharynx: No oropharyngeal exudate.   Eyes:      General:         Right eye: No discharge.         Left eye: No discharge.      Extraocular Movements: Extraocular movements intact.   Neck:      Trachea: No tracheal deviation.   Cardiovascular:      Rate and Rhythm: Normal rate and regular rhythm.      Heart sounds: Murmur heard.     No friction rub. No gallop.   Pulmonary:      Effort: Pulmonary effort is normal. No respiratory distress.      Breath sounds: Normal breath sounds. No stridor. No wheezing or rales.   Chest:      Chest wall: No tenderness.   Abdominal:      General: Bowel sounds are normal. There is no distension.      Palpations: Abdomen is soft.      Tenderness: There is no abdominal tenderness.   Musculoskeletal:         General: No tenderness. Normal range of motion.      Cervical back: Neck supple.      Right lower leg: No edema.      Left lower leg: No edema.   Lymphadenopathy:      Cervical: No cervical adenopathy.   Skin:     General: Skin is warm and dry.      Findings: No erythema or rash.   Neurological:      General: No focal deficit present.      Mental Status: She is alert and oriented to person, place, and time.      Cranial Nerves: No cranial nerve deficit.   Psychiatric:         Mood and Affect: Mood normal.         Behavior: Behavior normal.         Thought Content: Thought content normal.         Judgment: Judgment normal.         Laboratory:  Recent Labs     07/11/22  0015   WBC 6.6   RBC 2.92*   HEMOGLOBIN 7.6*   HEMATOCRIT 25.1*   MCV 86.0   MCH 26.0*   MCHC 30.3*   RDW 61.1*    PLATELETCT 238   MPV 10.4     Recent Labs     07/11/22  0015   SODIUM 120*   POTASSIUM 5.7*   CHLORIDE 81*   CO2 25   GLUCOSE 92   BUN 52*   CREATININE 6.49*   CALCIUM 8.1*     Recent Labs     07/11/22  0015   ALTSGPT 9   ASTSGOT 21   ALKPHOSPHAT 87   TBILIRUBIN 0.4   GLUCOSE 92     Recent Labs     07/11/22  0015   APTT 36.4*   INR 1.34*     Recent Labs     07/11/22  0015   NTPROBNP >66078*         Recent Labs     07/11/22  0015   TROPONINT 738*       Imaging:  DX-CHEST-PORTABLE (1 VIEW)   Final Result      Stable cardiomegaly. No acute cardiopulmonary abnormality.             X-Ray:  I have personally reviewed the images and compared with prior images.    Assessment/Plan:  Justification for Admission Status  I anticipate this patient will require at least two midnights for appropriate medical management, necessitating inpatient admission because Significant hyponatremia as well as hyperkalemia and anemia    * Hyponatremia- (present on admission)  Assessment & Plan  - Significant hyponatremia, will likely need multiple sessions of hemodialysis to correct this  -I do not however see signs of significant fluid overload  -Fluid restriction to 1500 mL  -Repeat BMP in the morning    Anemia due to chronic kidney disease- (present on admission)  Assessment & Plan  - ERP has ordered 1 unit to be given  -Anemia is chronic due to her renal failure and lupus  -No sign of gross bleeding  -Repeat CBC in the morning    Hyperkalemia- (present on admission)  Assessment & Plan  - Needs dialysis however not urgently  -Give dextrose followed by IV insulin as well as calcium  -Closely monitor potassium  -No changes thus far on telemetry, continue to monitor    ESRD (end stage renal disease) on dialysis (HCC)- (present on admission)  Assessment & Plan  - Tomorrow as a scheduled hemodialysis today, will need hemodialysis immediate for multiple days to treat her severe hyponatremia as well as mild hyperkalemia  -Nephrology will need to  be consulted in the morning, no need for urgent hemodialysis at this time    Chronic respiratory failure with hypoxia (HCC)- (present on admission)  Assessment & Plan  - At baseline, chest x-ray does not have significant pulmonary edema    Back pain- (present on admission)  Assessment & Plan  - Chronic, home meds will be resumed, Tylenol as well as as needed oral Dilaudid, avoid IV narcotics    Gastroesophageal reflux disease without esophagitis- (present on admission)  Assessment & Plan  - Continue home PPI    Elevated troponin chronic- (present on admission)  Assessment & Plan  - Does seem worse than her baseline but she denies any chest pain  -I do not think there is an acute event however I will trend troponin  -Monitor patient on telemetry    Lupus (HCC)- (present on admission)  Assessment & Plan  - Chronic, continue home meds    HTN (hypertension)- (present on admission)  Assessment & Plan  - Continue home amlodipine, atenolol and clonidine  -Start as needed labetalol  -Adjust as needed      VTE prophylaxis: SCDs/TEDs

## 2022-07-11 NOTE — ASSESSMENT & PLAN NOTE
- Needs dialysis however not urgently  -Give dextrose followed by IV insulin as well as calcium  -Closely monitor potassium  -No changes thus far on telemetry, continue to monitor

## 2022-07-11 NOTE — PROGRESS NOTES
Blood bank called to inform me that they have blood ready for this patient for a transfusion. Patient states that she needs to be pre-medicated with 25 mg IV benadryl prior to having a transfusion. Notified Dr. Styles that patient had requested IV benadryl. Per Dr. Styles he did not order a blood transfusion, pass on to day team.

## 2022-07-11 NOTE — ASSESSMENT & PLAN NOTE
- Tomorrow as a scheduled hemodialysis today, will need hemodialysis immediate for multiple days to treat her severe hyponatremia as well as mild hyperkalemia  -Nephrology will need to be consulted in the morning, no need for urgent hemodialysis at this time

## 2022-07-11 NOTE — ASSESSMENT & PLAN NOTE
- Chronic, home meds will be resumed, Tylenol as well as as needed oral Dilaudid, avoid IV narcotics

## 2022-07-12 NOTE — CONSULTS
DATE OF SERVICE:  07/11/2022     REQUESTING PHYSICIAN:  Jorge Styles DO     REASON FOR CONSULTATION:  Management of chronic kidney disease, assessing the   need for dialysis.     The patient is seen and examined, medical record reviewed.     HISTORY OF PRESENT ILLNESS:  The patient is an unfortunate 26-year-old lady   with past medical history significant for end-stage renal disease, presented   to the hospital earlier this morning with back pain.  The patient was found to   have hyperkalemia, so we were called to manage her hyperkalemia and end-stage   renal disease, assessing the need for urgent dialysis.     When I saw her this morning, the patient continues to have back pain, the   patient has no cough, no hemoptysis.     PAST MEDICAL HISTORY:  Significant for:  1.  End-stage renal disease.  2.  Lupus.  3.  Chronic pain.     ALLERGIES:  The list was reviewed.     SOCIAL HISTORY:  The patient has no smoking history.     FAMILY HISTORY:  Positive for diabetes in her grandmother.     MEDICATIONS:  Reviewed.     REVIEW OF SYSTEMS:  All systems were reviewed; they were negative except   outlined in the history of present illness.     PHYSICAL EXAMINATION:  GENERAL:  The patient appears ill.  She is moaning in pain from her back pain,   but no apparent distress.  VITAL SIGNS:  Showed blood pressure of 100/60, heart rate was 68, respiratory   rate was 18.  HEENT:  Normocephalic, atraumatic.  Sclerae are anicteric.  Pupils are   reactive.  Nose normal.  Mucous membranes moist.  NECK:  No lymphadenopathy, no JVD, no thyroid mass.  CHEST:  Normal.  LUNGS:  Clear to auscultation.  HEART:  S1, S2.  ABDOMEN:  Soft, nontender.  No hepatosplenomegaly.  There is no inguinal   lymphadenopathy.  EXTREMITIES:  There is trace lower extremity edema.  SKIN:  No skin rash.  NEUROLOGIC:  The patient is alert and oriented x3.  MOOD:  The patient is anxious.     LABORATORY DATA:  Her recent labs from today were reviewed.      DIAGNOSTIC DATA:  The patient had chest x-ray done earlier today.  I reviewed   the image myself, which showed cardiomegaly.     ASSESSMENT:  1.  End-stage renal disease.  2.  Hyperkalemia.  3.  Back pain.  4.  Anemia.  5.  Hyponatremia.     PLAN:  1.  We will plan urgent dialysis to manage her hyperkalemia as well as Uremia.  2.  Free water restriction.  3.  Serial sodium level check.  4.  Erythropoietin with dialysis.  5.  Evaluate daily for the need of dialysis.  6.  Management of chronic back pain as per primary team.  7.  Prognosis is guarded.        ______________________________  FADI NAJJAR, MD FN/KASIA    DD:  07/11/2022 14:04  DT:  07/11/2022 18:16    Job#:  [id number]

## 2022-07-12 NOTE — DISCHARGE INSTRUCTIONS
Discharge Instructions    Discharged to home by car with relative. Discharged via wheelchair, hospital escort: Yes.  Special equipment needed: Not Applicable    Be sure to schedule a follow-up appointment with your primary care doctor or any specialists as instructed.     Discharge Plan:        I understand that a diet low in cholesterol, fat, and sodium is recommended for good health. Unless I have been given specific instructions below for another diet, I accept this instruction as my diet prescription.     Special Instructions: None    -Is this patient being discharged with medication to prevent blood clots?  No    Is patient discharged on Warfarin / Coumadin?   No

## 2022-07-12 NOTE — PROGRESS NOTES
Telemetry Shift Summary     Rhythm: SR/ST  HR Range:   Ectopy: rPVC  Measurements: 0.16/0.10/0.40           Normal Values  Rhythm SR  HR Range    Measurements 0.12-0.20 / 0.06-0.10  / 0.30-0.52

## 2022-07-19 NOTE — ED NOTES
Patient slowly ambulated to the bathroom using her oxygen tank with a family member at her side    Transferred patient back to the room via wheel chair and oxygen, she assist herself up on the gurney.

## 2022-07-19 NOTE — ED NOTES
Pt to ED today for evaluation of Fatigue, dizziness and Anxiety starting a week ago and increasing daily since onset.

## 2022-07-19 NOTE — ED TRIAGE NOTES
"Chief Complaint   Patient presents with   • Fatigue   • Dizziness     Pt to ED tonight for evaluation of fatigue, anxiety, and dizziness a week ago and increasing in severity since onset.     • Anxiety   BP (!) 130/95   Pulse 65   Temp 37.1 °C (98.7 °F) (Temporal)   Resp 13   Ht 1.702 m (5' 7\")   Wt 52.2 kg (115 lb)   LMP 06/20/2022   SpO2 95%   BMI 18.01 kg/m²   "

## 2022-07-19 NOTE — ED NOTES
No IV placed.    Discharge home with instructions and follow up care reviewed and given to patient with verbal understanding.    Family member with patient.

## 2022-07-19 NOTE — ED TRIAGE NOTES
"  BP (!) 130/95   Pulse 65   Temp 37.1 °C (98.7 °F) (Temporal)   Resp 13   Ht 1.702 m (5' 7\")   Wt 52.2 kg (115 lb)   LMP  (LMP Unknown)   SpO2 95%   BMI 18.01 kg/m²   "

## 2022-07-19 NOTE — ED PROVIDER NOTES
"ED Provider Note    CHIEF COMPLAINT  Chief Complaint   Patient presents with   • Fatigue   • Dizziness     [facility] of fatigue, anxiety, and dizziness a week ago and increasing in severity since onset.     • Anxiety       HPI  Lily Nicole is a 26 y.o. female who presents for evaluation of anxiety, weakness, and fatigue.  Patient states she has been feeling gradually weak since Friday and, after dialysis today she was told that she may be bleeding longer than usual and was told to come to the emergency department.  She notes she has pain \"all over\" and is requesting morphine or Dilaudid as well as something for anxiety.  She notes she has a history of anemia and has had a transfusion recently.  She wonders if this might be related.  She also has a recent history of mild hyponatremia during hospitalization.    REVIEW OF SYSTEMS  Constitutional: No fevers or chills.  Fatigue and malaise  Skin: No rashes  HEENT: No sore throat, or runny nose  Neck: No stiffness, or masses.  Chest: Denies chest pain or rashes  Pulm: No shortness of breath, cough, wheezing, stridor, or pain with inspiration/expiration  Gastrointestinal: No nausea, vomiting, diarrhea, constipation, bloating, melena, hematochezia   Genitourinary: No dysuria or hematuria  Musculoskeletal: No swelling, weakness  Neurologic: No sensory or motor changes. No confusion or disorientation.  Psych: Anxious  Heme: Bruises and bleeds easily.   Immuno: History of ESRD due to lupus    PAST FAM HISTORY  Family History   Problem Relation Age of Onset   • Diabetes Paternal Grandmother        PAST MEDICAL HISTORY   has a past medical history of Anemia (01/17/2018), AVF (arteriovenous fistula) ([facility]), Chest pain, Chest tightness, Daytime sleepiness, Dialysis patient ([facility]), Difficulty breathing, ESRD (end stage renal disease) on dialysis ([facility]) (01/17/2018), Gasping for breath, Heart burn, Hypertension (01/17/2018), Indigestion, Lupus ([facility]), " Migraines (01/17/2018), Painful breathing, Palpitations, Seizure (HCC) (2013), Shortness of breath, Swelling of lower extremity, and Wheezing.    SOCIAL HISTORY  Social History     Tobacco Use   • Smoking status: Never Smoker   • Smokeless tobacco: Never Used   Vaping Use   • Vaping Use: Never used   Substance and Sexual Activity   • Alcohol use: No   • Drug use: No   • Sexual activity: Not on file       SURGICAL HISTORY   has a past surgical history that includes ronak by laparoscopy (4/5/2010); av fistula creation (Right); angioplasty (01/17/2018); other; other; gastroscopy-endo (12/9/2019); gastroscopy (N/A, 5/30/2020); gastroscopy-endo (9/18/2020); upper gi endoscopy,ctrl bleed (11/12/2020); upper gi endoscopy,biopsy (11/12/2020); gastroscopy-endo (11/12/2020); colonoscopy,diagnostic (1/8/2021); upper gi endoscopy,diagnosis (1/8/2021); upper gi endoscopy,ctrl bleed (1/8/2021); upper gi endoscopy,diagnosis (3/5/2021); upper gi endoscopy,diagnosis (N/A, 3/19/2021); upper gi endoscopy,ctrl bleed (3/19/2021); and bronchoscopy,diagnostic (Bilateral, 5/13/2021).    CURRENT MEDICATIONS  Home Medications    **Home medications have not yet been reviewed for this encounter**         ALLERGIES  Allergies   Allergen Reactions   • Cephalexin Rash     Nausea and rash   Hives  .   • Clindamycin Rash     Nausea and rash     Hive  .   • Methylprednisolone      Anxious  Other reaction(s): Other-Reaction in Comments  Anxious    Tolerates prednisone    • Metoprolol Rash and Nausea     Nausea and rash     Tolerates antenalol   • Compazine      C/o anxiety   • Fentanyl And Related Anxiety   • Hydrocodone-Acetaminophen Rash and Nausea     Generalized rash  Generalized rash    Tolerates acetaminophen  Tolerates hydromorphone   • Maxipime [Cefepime] Itching   • Reglan [Metoclopramide] Anxiety   • Tape Rash     Paper tape is ok       PHYSICAL EXAM  VITAL SIGNS: BP (!) 151/91   Pulse 76   Temp 37.1 °C (98.7 °F) (Temporal)   Resp 18    "Ht 1.702 m (5' 7\")   Wt 52.2 kg (115 lb)   LMP 06/20/2022   SpO2 99%   BMI 18.01 kg/m²    Gen: Appears tired otherwise alert in no apparent distress  HEENT: No signs of trauma, Bilateral external ears normal, Nose normal. Conjunctiva normal, Non-icteric.   Neck:  No tenderness, Supple, No masses  Lymphatic: No cervical lymphadenopathy noted.   Cardiovascular: Regular rate and rhythm.  Capillary refill less than 3 seconds to all extremities, 2+ distal pulses.  Thorax & Lungs: Normal breath sounds, No respiratory distress, No wheezing bilateral chest rise  Abdomen: Bowel sounds normal, Soft, No tenderness, No masses, No pulsatile masses. No Guarding or rebound  Skin: Warm, Dry, No erythema, No rash noted to exposed areas.   Back: No bony tenderness, No CVA tenderness.   Extremities: Intact distal pulses, No edema.  Fistula right upper extremity with positive thrill.  No overlying erythema or induration.  Neurologic: Alert , no facial droop, grossly normal coordination and strength  Psychiatric: Affect pleasant      LABS  Results for orders placed or performed during the hospital encounter of 07/18/22   CBC WITH DIFFERENTIAL   Result Value Ref Range    WBC 3.6 (L) 4.8 - 10.8 K/uL    RBC 3.70 (L) 4.20 - 5.40 M/uL    Hemoglobin 9.8 (L) 12.0 - 16.0 g/dL    Hematocrit 32.7 (L) 37.0 - 47.0 %    MCV 88.4 81.4 - 97.8 fL    MCH 26.5 (L) 27.0 - 33.0 pg    MCHC 30.0 (L) 33.6 - 35.0 g/dL    RDW 62.0 (H) 35.9 - 50.0 fL    Platelet Count 250 164 - 446 K/uL    MPV 9.8 9.0 - 12.9 fL    Neutrophils-Polys 85.00 (H) 44.00 - 72.00 %    Lymphocytes 12.00 (L) 22.00 - 41.00 %    Monocytes 3.00 0.00 - 13.40 %    Eosinophils 0.00 0.00 - 6.90 %    Basophils 0.00 0.00 - 1.80 %    Nucleated RBC 0.00 /100 WBC    Neutrophils (Absolute) 3.06 2.00 - 7.15 K/uL    Lymphs (Absolute) 0.43 (L) 1.00 - 4.80 K/uL    Monos (Absolute) 0.11 0.00 - 0.85 K/uL    Eos (Absolute) 0.00 0.00 - 0.51 K/uL    Baso (Absolute) 0.00 0.00 - 0.12 K/uL    NRBC (Absolute) " 0.00 K/uL   COMP METABOLIC PANEL   Result Value Ref Range    Sodium 130 (L) 135 - 145 mmol/L    Potassium 3.7 3.6 - 5.5 mmol/L    Chloride 88 (L) 96 - 112 mmol/L    Co2 30 20 - 33 mmol/L    Anion Gap 12.0 7.0 - 16.0    Glucose 119 (H) 65 - 99 mg/dL    Bun 21 8 - 22 mg/dL    Creatinine 4.11 (H) 0.50 - 1.40 mg/dL    Calcium 8.2 (L) 8.4 - 10.2 mg/dL    AST(SGOT) 21 12 - 45 U/L    ALT(SGPT) 21 2 - 50 U/L    Alkaline Phosphatase 157 (H) 30 - 99 U/L    Total Bilirubin 0.4 0.1 - 1.5 mg/dL    Albumin 2.6 (L) 3.2 - 4.9 g/dL    Total Protein 9.0 (H) 6.0 - 8.2 g/dL    Globulin 6.4 (H) 1.9 - 3.5 g/dL    A-G Ratio 0.4 g/dL   TROPONIN   Result Value Ref Range    Troponin T 555 (H) 6 - 19 ng/L   MAGNESIUM   Result Value Ref Range    Magnesium 1.5 1.5 - 2.5 mg/dL   COV-2, FLU A/B, AND RSV BY PCR (2-4 HOURS CEPHEID): Collect NP swab in VTM    Specimen: Respirate   Result Value Ref Range    Influenza virus A RNA Negative Negative    Influenza virus B, PCR Negative Negative    RSV, PCR Negative Negative    SARS-CoV-2 by PCR NotDetected     SARS-CoV-2 Source NP Swab    LACTIC ACID   Result Value Ref Range    Lactic Acid 2.7 (H) 0.5 - 2.0 mmol/L   PROTHROMBIN TIME (INR)   Result Value Ref Range    PT 14.9 (H) 12.0 - 14.6 sec    INR 1.22 (H) 0.87 - 1.13   APTT   Result Value Ref Range    APTT 33.0 24.7 - 36.0 sec   ESTIMATED GFR   Result Value Ref Range    GFR (CKD-EPI) 15 (A) >60 mL/min/1.73 m 2   PLATELET ESTIMATE   Result Value Ref Range    Plt Estimation Normal    MORPHOLOGY   Result Value Ref Range    RBC Morphology Normal    DIFFERENTIAL MANUAL   Result Value Ref Range    Manual Diff Status PERFORMED    EKG (NOW)   Result Value Ref Range    Report       [facility] Emergency Dept.    Test Date:  2022  Pt Name:    CASE WOODSON            Department: [facility]  MRN:        [id number]                      [facility]  Gender:     Female                       Technician: BRIGHT  :        1995    "                Requested By:SVETLANA PENA  Order #:    [id number]                    Reading MD:    Measurements  Intervals                                Axis  Rate:       89                           P:          23  IN:         147                          QRS:        -24  QRSD:       93                           T:          70  QT:         363  QTc:        442    Interpretive Statements  Sinus rhythm  Probable left ventricular hypertrophy  Baseline wander in lead(s) II,III,aVF  Compared to ECG 07/10/2022 23:45:31  Early repolarization no longer present         RADIOLOGY  DX-CHEST-PORTABLE (1 VIEW)   Final Result         1.  No acute cardiopulmonary disease.          COURSE & MEDICAL DECISION MAKING  Patient arrives for evaluation of vague symptoms of fatigue and malaise over the past few days and was told today she was bleeding easier than usual during her dialysis run.  Patient also notes that she does not have any specific areas of pain on her review of systems but then asked for pain medication for \"pain all over.\"  She notes this is her normal pain and she normally takes morphine and Dilaudid.    12:54 AM  Patient resting quietly.  She states understanding the findings and of the fact that she does not appear to be significantly anemic or hyponatremic.  Her potassium is within the normal range and there are no findings today to suggest an emergent issue.  Her troponin is once again elevated however it was lower than it was last time.  Notable that she does not have any specific chest pain today.  This is likely her norm and I do not feel it requires further inpatient evaluation.  She is comfortable plan for discharge and already has a follow-up with her primary care physician within the next few days.  She stated understanding that she could return if her symptoms worsen or change in any way.    FINAL IMPRESSION  1. Other fatigue    2. Anxiety    3. Dizziness        Electronically signed by: Svetlana GUAN " TERRI Kirk, 7/18/2022 11:22 PM

## 2022-07-19 NOTE — PROGRESS NOTES
[facility]   Psych[facility] Progress Note        Name: Lily Nicole  MRN: [id number]  : 1995  Age: 26 y.o.  Date of assessment: 2022  PCP: Ella Matthew M.D.  Persons in attendance: Patient  Total session time: 54 minutes      Topics addressed in psychotherapy include: Today's session covered the topic of stressors and issues pertaining to depression.  Therapist provided validation, support and feedback.  Therapist also provided a new tool for patient to utilize. The patient was encouraged to utilize the tool often at home and other settings.  There was no SI.      Objective Observations:   Participation:Active verbal participation   Grooming:Casual   Cognition:Alert and Fully Oriented   Eye Contact:Good   Mood:Euthymic   Affect:Congruent with content   Thought Process:Logical and Goal-directed   Speech:Rate within normal limits and Volume within normal limits    Current Risk:   Suicide: low   Homicide: low   Self-Harm: low   Relapse:    Safety Plan Needed:     Care Plan Updated: No    Does patient express agreement with the above plan? Yes     Diagnosis:  1. Persistent depressive disorder with anxious distress, currently mild        Follow up appointment(s) scheduled? Yes       Edna Dahl PsyD

## 2022-07-19 NOTE — ED NOTES
Pantera from Lab called with critical result of Troponin 555 at 0024. Critical lab result read back to Pantera.   Dr. Kirk notified of critical lab result at 0024.  Critical lab result read back by Dr. Kirk.

## 2022-07-20 NOTE — DOCUMENTATION QUERY
[facility]                                                                       Query Response Note      PATIENT:               CASE REYNOSO  ACCT #:                  [id number]  MRN:                     [id number]  :                      1995  ADMIT DATE:       7/10/2022 11:43 PM  DISCH DATE:        2022 6:07 PM  RESPONDING  PROVIDER #:        [id number]           QUERY TEXT:      The patient carries a diagnosis of lupus. With the implementation of ICD  10 CM, there is no longer a default code for lupus to systemic lupus erythematosus. Coding Clinic advises the  to query the physician for lupus specificity.   Based on clinical findings, risk factors and treatment, can lupus be further clarified as:    NOTE:  If an appropriate response is not listed below, please respond with a new note.      The patient's Clinical Indicators include:  H&P  Dr. CHANCE Styles  HPI:She does have a history of lupus causing end-stage renal disease with hemodialysis     Assessment/Plan  Lupus (HCC)- (present on admission)  Assessment & Plan  - Chronic, continue home meds    Discharge Summary  Dr. YANDEL Longo  Discharge Diagnoses:  Lupus    Treatment:plaquenil, prednisone  Risk Factors: ESRD, HTN    Luda Mullins  Associate  - Inpatient  [email or web address]  Options provided:   -- Lupus anticoagulant (LAC)   -- Lupus discoid (erythematosus) (local)   -- Lupus exedens   -- Lupus hydralazine   -- Lupus nontuberculous, not disseminated   -- Lupus pernio ([place])   -- Lupus systemic (SLE)   -- Lupus Tuberculous   -- Lupus vulgaris   -- Unable to determine      Query created by: Luda Mullins on 2022 6:27 PM    RESPONSE TEXT:    Lupus systemic (SLE)          Electronically signed by:  KAMI LONGO MD 2022 8:46 PM

## 2022-07-22 NOTE — ED PROVIDER NOTES
ED Physician Note    Chief Concern:   Headache, anxiety, low back pain    HPI:  Lily Nicole is a very pleasant 26-year-old woman who presents to the emergency department for evaluation of headache, low back pain, as well as anxiety.  She also has a history of chronic pain, she states she is currently prescribed Dilaudid however that is not helping her symptoms.  This is described as generalized body aches.  She states she developed a gradual onset headache about an hour ago, she does have a history of chronic headaches but today's headache seems worse.  Headache came on after her dialysis earlier today.  She does have a history of end-stage renal disease on dialysis.  She also reports some pain localized to the low back, that pain is chronic, with occasional exacerbations, symptoms today do not seem any different.  She feels generally tired and fatigued, but does not noticed any true lower extremity weakness, no unilateral weakness in the upper or lower extremities, no facial droop, no slurred speech, no vision changes.  She has not had any associated nausea or vomiting.  She also states her anxiety is not well controlled at this time, he is requesting medication for anxiety.  She states that she does not have any history of anxiety.    Review of Systems:  See HPI for pertinent positives and negatives. All other systems negative.    Past Medical History:   has a past medical history of Anemia (01/17/2018), AVF (arteriovenous fistula) ([facility]), Chest pain, Chest tightness, Daytime sleepiness, Dialysis patient ([facility]), Difficulty breathing, ESRD (end stage renal disease) on dialysis ([facility]) (01/17/2018), Gasping for breath, Heart burn, Hypertension (01/17/2018), Indigestion, Lupus ([facility]), Migraines (01/17/2018), Painful breathing, Palpitations, Seizure ([facility]) (2013), Shortness of breath, Swelling of lower extremity, and Wheezing.    Social History:  Social History     Tobacco Use   • Smoking status: Never Smoker   •  Smokeless tobacco: Never Used   Vaping Use   • Vaping Use: Never used   Substance and Sexual Activity   • Alcohol use: No   • Drug use: No   • Sexual activity: Not on file       Surgical History:   has a past surgical history that includes ronak by laparoscopy (4/5/2010); av fistula creation (Right); angioplasty (01/17/2018); other; other; gastroscopy-endo (12/9/2019); gastroscopy (N/A, 5/30/2020); gastroscopy-endo (9/18/2020); upper gi endoscopy,ctrl bleed (11/12/2020); upper gi endoscopy,biopsy (11/12/2020); gastroscopy-endo (11/12/2020); colonoscopy,diagnostic (1/8/2021); upper gi endoscopy,diagnosis (1/8/2021); upper gi endoscopy,ctrl bleed (1/8/2021); upper gi endoscopy,diagnosis (3/5/2021); upper gi endoscopy,diagnosis (N/A, 3/19/2021); upper gi endoscopy,ctrl bleed (3/19/2021); and bronchoscopy,diagnostic (Bilateral, 5/13/2021).    Current Medications:  Home Medications     Reviewed by Shefali Lambert (Pharmacy Tech) on 07/22/22 at 1523  Med List Status: Complete   Medication Last Dose Status   amLODIPine (NORVASC) 5 MG Tab 7/21/2022 Active   atenolol (TENORMIN) 25 MG Tab 7/22/2022 Active   cloNIDine (CATAPRES) 0.2 MG/24HR PATCH WEEKLY patch 7/21/2022 Active   cloNIDine (CATAPRES) 0.3 MG Tab 7/22/2022 Active   HYDROmorphone (DILAUDID) 2 MG Tab unknown Active   hydroxychloroquine (PLAQUENIL) 200 MG Tab 7/21/2022 Active   mycophenolate sodium (MYFORTIC) 360 MG Tablet Delayed Response tablet 7/21/2022 Active   ondansetron (ZOFRAN ODT) 4 MG TABLET DISPERSIBLE unknown Active   pantoprazole (PROTONIX) 40 MG Tablet Delayed Response 7/22/2022 Active   predniSONE (DELTASONE) 5 MG Tab 7/22/2022 Active                Allergies:  Allergies   Allergen Reactions   • Cephalexin Rash     Nausea and rash   Hives  .   • Clindamycin Rash     Nausea and rash     Hive  .   • Methylprednisolone      Anxious  Other reaction(s): Other-Reaction in Comments  Anxious    Tolerates prednisone    • Metoprolol Rash and Nausea      "Nausea and rash     Tolerates antenalol   • Compazine      C/o anxiety   • Fentanyl And Related Anxiety   • Hydrocodone-Acetaminophen Rash and Nausea     Generalized rash  Generalized rash    Tolerates acetaminophen  Tolerates hydromorphone   • Maxipime [Cefepime] Itching   • Reglan [Metoclopramide] Anxiety   • Tape Rash     Paper tape is ok       Physical Exam:  Vital Signs: /87   Pulse 85   Temp 37.7 °C (99.9 °F) (Temporal)   Resp 20   Ht 1.702 m (5' 7\")   Wt 52.2 kg (115 lb) Comment: bib remsa  LMP  (LMP Unknown)   SpO2 96%   BMI 18.01 kg/m²   Constitutional: Alert, no acute distress  HENT: Normocephalic, mask in place  Eyes: Pupils equal and reactive, normal conjunctiva  Neck: Supple, normal range of motion, no stridor  Cardiovascular: Extremities are warm and well perfused, no murmur appreciated, normal cardiac auscultation, tachycardic rate 99  Pulmonary: No respiratory distress, normal work of breathing, no accessory muscule usage, breath sounds clear and equal bilaterally  Abdomen: Soft, non-distended, non-tender to palpation, no peritoneal signs  Skin: Warm, dry, no rashes or lesions  Musculoskeletal: Normal range of motion in all extremities, no swelling or deformity noted  Neurologic: Alert, oriented, normal speech, normal motor function, no facial droop, no slurred speech, normal mentation, 5 out of 5 upper and lower extremity strength, no truncal ataxia  Psychiatric: Normal and appropriate mood and affect    Medical records reviewed for continuity of care. Mr. Nicole was seen in the emergency department 7/18/2022, 4 days ago, for evaluation of anxiety, weakness, and fatigue.  She does have a history of end-stage renal disease on dialysis, and after dialysis earlier that day she may be bleeding longer than usual, was told to come to the emergency department.  She reports having pain all over, and is requesting morphine or Dilaudid as well as something for anxiety.  She reported history of " mild hyponatremia, as well as anemia.  Sodium at that visit was 130, hemoglobin was 9.8.  Potassium was 3.7.  Noted that she has an elevated troponin at baseline.  She was discharged home in stable condition.    [place] database reviewed, she is receiving routine prescriptions for Dilaudid, most recently filled that medication 7/7/2022.    Labs:  Labs Reviewed   CBC WITH DIFFERENTIAL - Abnormal; Notable for the following components:       Result Value    RBC 3.65 (*)     Hemoglobin 9.5 (*)     Hematocrit 31.8 (*)     MCH 26.0 (*)     MCHC 29.9 (*)     RDW 61.1 (*)     Neutrophils-Polys 89.30 (*)     Lymphocytes 6.40 (*)     Lymphs (Absolute) 0.43 (*)     All other components within normal limits   COMP METABOLIC PANEL - Abnormal; Notable for the following components:    Sodium 132 (*)     Potassium 3.4 (*)     Chloride 91 (*)     Creatinine 2.83 (*)     Calcium 8.1 (*)     Alkaline Phosphatase 119 (*)     Albumin 2.5 (*)     Total Protein 8.6 (*)     Globulin 6.1 (*)     All other components within normal limits   ESTIMATED GFR - Abnormal; Notable for the following components:    GFR (CKD-EPI) 23 (*)     All other components within normal limits   HCG QUAL SERUM   COV-2, FLU A/B, AND RSV BY PCR (CEPHEID)   LACTIC ACID       Radiology:  CT-HEAD W/O   Final Result      No acute intracranial abnormality.                       ED Medications Administered:  Medications   diphenhydrAMINE (BENADRYL) injection 25 mg (25 mg Intravenous Given 7/22/22 1349)       Differential diagnosis:  Intracranial bleed, Sirs, sepsis, electrolyte abnormality, chronic pain, viral illness, COVID-19 meningitis is a consideration, though she has no neck pain, no photophobia, painless range of motion    MDM:  Ms. Martín Nicole presents to the emergency department today for evaluation of headache, describes gradual onset beginning an hour prior to arrival.  She also has some chronic low back pain that is similar to previous exacerbation of chronic  low back pain.  She states that this may be due to her lupus, and physicians not been able to figure out why she gets chronic pain, however she continues to maintain on oral Dilaudid for the symptoms.  She states that her home dose of Dilaudid is not helping as much as it usually does today.  She has no focal neurologic deficits.  She is afebrile on arrival.     On laboratory evaluation White blood count is 6.7, no bands resulted at this time.  Hemoglobin is 9.5, consistent with prior baseline values, no evidence of acute blood loss.  Platelet count is within normal limits.  Neutrophil percentage is 89%, this seems consistent with her prior values as well.  Creatinine is 2.83 consistent with her history of end-stage renal disease, potassium is 3.4 no indication for emergent dialysis.  HCG is negative.  COVID-19 testing is negative.    CT head was ordered due to acute onset headache after dialysis, about an hour ago.  This is negative for acute intracranial abnormality.  As the headache began an hour prior to arrival, CT is quite sensitive for subarachnoid hemorrhage, this is also not the most severe headache she has had, and she did not have true thunderclap onset.    I did consider epidural abscess given risk factor of renal disease on dialysis, however she has no lower extremity weakness, no neurologic symptoms, she is afebrile, and has normal white blood count, as well as a normal lactic acid.  Back pain is reported as similar to prior episodes of back pain, believe epidural abscess is much less likely.    Her anxiety was treated with Benadryl.  On my reassessment, she remains well-appearing without any new or worsening symptoms.  She has no new neurologic deficits.  Suspect her low back pain is likely due to her chronic pain, she is due for a dose of her home Dilaudid, and can take that medication after discharge.    Personal protective equipment including N95 surgical respirator, goggles, and gloves were used  during this encounter.       Disposition:  Discharge home in stable condition    Final Impression:  1. Nonintractable episodic headache, unspecified headache type    2. Chronic bilateral low back pain without sciatica    3. End stage renal disease on dialysis (HCC)        Electronically signed by: Sol Castillo MD, 7/22/2022 3:49 PM

## 2022-07-22 NOTE — ED NOTES
Dc instructions reviewed with pt. To f/u with pcp, return for worsening s/s. Requests wheel chair for d/c. Aware of need for family to provide oxygen for d/c

## 2022-07-22 NOTE — DISCHARGE INSTRUCTIONS
Please schedule follow-up appointment with your primary care physician within 24 to 48 hours for complete recheck.  Return to the emergency department if you develop any new or worsening symptoms including recurrent headache, nausea, vomiting, if you have any fevers, numbness or tingling in the legs, or if you have any further concerns.

## 2022-07-22 NOTE — ED NOTES
covid swab collected upon return from ct. States slight improvement  in previous discomfort, pain now 5/10

## 2022-07-22 NOTE — ED NOTES
Pt to er via [facility] with c/o headache, low back pain and anxiety x 1 hr. Pt recvd a total of 10mg morphine iv for same en route with minimal cg in discomfort. Pt is esrd due to lupus   and on  dialysis for same. State last dialysis treatment this am, 2L removed. Is on home oxygen 3L n/c per home regime, states is anuric, covid vaccinated and last test for same was on Monday and was -

## 2022-07-22 NOTE — DISCHARGE PLANNING
Outpatient Dialysis Note    Confirmed patient is active at:    [facility]   [address] [place] [id number]    Schedule: Monday, Wednesday, Friday   Time: 6:00am     Patient is seen by Dr. Trent in HD clinic.    Spoke with Viki at facility who confirmed.    Forwarded records for review.    Mary Han- Senior Dialysis Coordinator # [phone]  [facility]

## 2022-07-28 NOTE — TELEPHONE ENCOUNTER
"----- Message from Dina Villagomez R.N. sent at 7/28/2022  7:58 AM PDT -----  Regarding: FW: Iodine Reaction    ----- Message -----  From: Trevor Oliveira M.D.  Sent: 7/26/2022  10:00 AM PDT  To: Breana Machuca R.N.  Subject: RE: Iodine Reaction                              Yes thank you. Breana, can I get your help? If it sounds like allergy (and not just \"I got nausea with shellfish one time\".... which btw shellfish allergy doesn't cross reach with iodonated contrast) lets pre-treat:  Oral prednisone 40 mg at 13, 7, and 1 hour prior to contrast administration.   Diphenhydramine 50 mg oral, IM, or IV 1 hour prior to contrast administration.  Thanks team!        ----- Message -----  From: Aura Alvarado  Sent: 7/25/2022  11:44 AM PDT  To: Trevor Oliveira M.D.  Subject: Iodine Reaction                                  Hello,    I just scheduled a CT-3D for this patient and she is stating she has a reaction to iodine and needs benadryl?    Can you assist with this prescription please?    Thanks,    Aura"

## 2022-07-28 NOTE — TELEPHONE ENCOUNTER
Called patient mobile number and spoke to the patient. Asked her what her reaction is to iodine and contrast and she stated it is because she is on dialysis and gets anxious. Before testing she gets benadryl for her anxiety. Confirmed her last labs were on 07/22/2 in the ER. Advised will notify VR and then let her know. Answered all questions and concerns, appreciative of call.     Routed to VR.

## 2022-07-29 NOTE — TELEPHONE ENCOUNTER
Called patient mobile number and spoke to the patient, relayed all VR recommendations. Answered all questions and concerns, appreciative of call.

## 2022-07-29 NOTE — ED PROVIDER NOTES
"ED Provider Note    CHIEF COMPLAINT  Chief Complaint   Patient presents with   • Shortness of Breath     BIB [facility] from home report of shortness of breath; had dialysis done yesterday and only removed 1 liter of fluid in dialysis.       HPI  Patient is a 26-year-old female who presents emergency room brought in by EMS personnel for multiple complaints including worsening headaches, worsening low back pain as well as \"feeling like I am in a have an anxiety attack.\"  She does have a history of chronic pain issues, is on chronic dialysis and states that she has recently had a decrease in total amount of fluid and has been taken out.  She also has a history of chronic shortness of breath for which she is on 3 to 5 L via nasal cannula.  Over the last several days she states that she has had a gradual onset headache, that was escalating within the last several hours and associated with development of her worsening back pain.  She believes that the headache came on after they did not take a lot of fluid out of dialysis yesterday.  She is also reporting that she has pain \"throughout my back.\"  Occasionally this is exacerbated but she denies any trauma recent falls denies any recent fevers or chills.  She has been chronically tired and fatigued and has denied any new weakness, numbness.  She has not had any slurred speech or appreciated facial droop and is denying any vision loss or diplopia.  She has not had any nausea, diarrheal illness or vomiting.  She states that she feels like \"everything is crashing down on me, can I have something for pain in my anxiety.\"    PPE Note: I personally donned full PPE for all patient encounters during this visit, including being clean-shaven with an N95 respirator mask, gloves, and goggles.     Chart is reviewed with the patient was seen on multiple occurrences this month in the emergency department both on 7/18 and 7/22 for very similar complaints.  She has a known history of chronic " renal disease on dialysis, and at the time that she presented she was requesting morphine, Dilaudid as well as something for anxiety.  She has well-documented history of labs and most recent values were sodium of 132, potassium low 3.4, hemoglobin 9.5 with creatinine of 2.83/GFR of 23.  REVIEW OF SYSTEMS  See HPI for further details. All other systems are negative.       PAST MEDICAL HISTORY   has a past medical history of Anemia (01/17/2018), AVF (arteriovenous fistula) ([facility]), Chest pain, Chest tightness, Daytime sleepiness, Dialysis patient ([facility]), Difficulty breathing, ESRD (end stage renal disease) on dialysis ([facility]) (01/17/2018), Gasping for breath, Heart burn, Hypertension (01/17/2018), Indigestion, Lupus ([facility]), Migraines (01/17/2018), Painful breathing, Palpitations, Seizure ([facility]) (2013), Shortness of breath, Swelling of lower extremity, and Wheezing.    SOCIAL HISTORY  Social History     Tobacco Use   • Smoking status: Never Smoker   • Smokeless tobacco: Never Used   Vaping Use   • Vaping Use: Never used   Substance and Sexual Activity   • Alcohol use: No   • Drug use: No   • Sexual activity: Not on file       SURGICAL HISTORY   has a past surgical history that includes ronak by laparoscopy (4/5/2010); av fistula creation (Right); angioplasty (01/17/2018); other; other; gastroscopy-endo (12/9/2019); gastroscopy (N/A, 5/30/2020); gastroscopy-endo (9/18/2020); upper gi endoscopy,ctrl bleed (11/12/2020); upper gi endoscopy,biopsy (11/12/2020); gastroscopy-endo (11/12/2020); colonoscopy,diagnostic (1/8/2021); upper gi endoscopy,diagnosis (1/8/2021); upper gi endoscopy,ctrl bleed (1/8/2021); upper gi endoscopy,diagnosis (3/5/2021); upper gi endoscopy,diagnosis (N/A, 3/19/2021); upper gi endoscopy,ctrl bleed (3/19/2021); and bronchoscopy,diagnostic (Bilateral, 5/13/2021).    CURRENT MEDICATIONS  Home Medications     Reviewed by Natalee Benavides R.N. (Registered Nurse) on 07/28/22 at 7927  Med List Status: Partial  "  Medication Last Dose Status   amLODIPine (NORVASC) 5 MG Tab  Active   atenolol (TENORMIN) 25 MG Tab  Active   cloNIDine (CATAPRES) 0.2 MG/24HR PATCH WEEKLY patch  Active   cloNIDine (CATAPRES) 0.3 MG Tab  Active   HYDROmorphone (DILAUDID) 2 MG Tab  Active   hydroxychloroquine (PLAQUENIL) 200 MG Tab  Active   mycophenolate sodium (MYFORTIC) 360 MG Tablet Delayed Response tablet  Active   ondansetron (ZOFRAN ODT) 4 MG TABLET DISPERSIBLE  Active   pantoprazole (PROTONIX) 40 MG Tablet Delayed Response  Active   predniSONE (DELTASONE) 5 MG Tab  Active                ALLERGIES  Allergies   Allergen Reactions   • Cephalexin Rash     Nausea and rash   Hives  .   • Clindamycin Rash     Nausea and rash     Hive  .   • Methylprednisolone      Anxious  Other reaction(s): Other-Reaction in Comments  Anxious    Tolerates prednisone    • Metoprolol Rash and Nausea     Nausea and rash     Tolerates antenalol   • Compazine      C/o anxiety   • Fentanyl And Related Anxiety   • Hydrocodone-Acetaminophen Rash and Nausea     Generalized rash  Generalized rash    Tolerates acetaminophen  Tolerates hydromorphone   • Maxipime [Cefepime] Itching   • Reglan [Metoclopramide] Anxiety   • Tape Rash     Paper tape is ok       PHYSICAL EXAM  VITAL SIGNS: BP (!) 178/107   Pulse 68   Temp 36.3 °C (97.4 °F) (Temporal)   Resp 16   Ht 1.702 m (5' 7\")   Wt 49.9 kg (110 lb)   LMP  (LMP Unknown)   SpO2 94%   BMI 17.23 kg/m²   Pulse ox interpretation: I interpret this pulse ox as normal.  Genl: chronically ill appearing F, sitting in [place] comfortably, speaking clearly, appears anxious and in mild distress   Head: NC/AT   ENT: Mucous membranes moist, posterior pharynx clear, uvula midline, nares patent bilaterally  Eyes: Normal sclera, pupils equal round reactive to light  Neck: Supple, FROM, no LAD appreciated   Pulmonary: Lungs are clear to auscultation bilaterally  Chest: No TTP  CV:  RRR, no murmur appreciated, pulses 2+ in both upper and " lower extremities,  Abdomen: soft, NT/ND; no rebound/guarding, no masses palpated, no HSM  : no CVA or suprapubic tenderness  Musculoskeletal: Pain free ROM of the neck. Moving upper and lower extremities and spontaneous in coordinated fashion  Neuro: A&Ox4 (person, place, time, situation), speech fluent, gait steady, no focal deficits appreciated, No cerebellar signs Sensation is grossly intact in the distal upper and lower extremities.  5/5 strength in  and dorsiflexion/plantar flexion of the ankles  Psych: Patient has an appropriate affect and behavior  Skin: No rash or lesions.  No pallor or jaundice.  No cyanosis.  Warm and dry.     DIAGNOSTIC STUDIES / PROCEDURES    LABS  Labs Reviewed   CBC WITH DIFFERENTIAL - Abnormal; Notable for the following components:       Result Value    WBC 3.8 (*)     RBC 4.05 (*)     Hemoglobin 10.5 (*)     Hematocrit 35.8 (*)     MCH 25.9 (*)     MCHC 29.3 (*)     RDW 62.9 (*)     Neutrophils-Polys 78.20 (*)     Lymphocytes 15.10 (*)     Lymphs (Absolute) 0.57 (*)     All other components within normal limits   COMP METABOLIC PANEL - Abnormal; Notable for the following components:    Sodium 129 (*)     Chloride 88 (*)     Bun 24 (*)     Creatinine 4.82 (*)     Alkaline Phosphatase 102 (*)     Albumin 2.6 (*)     Total Protein 9.2 (*)     Globulin 6.6 (*)     All other components within normal limits   ESTIMATED GFR - Abnormal; Notable for the following components:    GFR (CKD-EPI) 12 (*)     All other components within normal limits   LACTIC ACID   MORPHOLOGY   DIFFERENTIAL COMMENT     RADIOLOGY  CT-HEAD W/O   Final Result         1.  No acute intracranial abnormality.         DX-CHEST-PORTABLE (1 VIEW)   Final Result         1.  No acute cardiopulmonary disease.   2.  Cardiomegaly        COURSE & MEDICAL DECISION MAKING  Pertinent Labs & Imaging studies reviewed. (See chart for details)    DDX:  Chronic pain, fluid overload, pneumonia, CKD, dehydration, htn emergency,  intracranial bleed/spontaneous bleed    MDM  Number of Diagnoses or Management Options    Initial evaluation at 2215:  Seen and evaluated for symptoms as described above.  The patient has known history of chronic renal disease and on dialysis.  She is extremely anxious and requesting morphine, Dilaudid and something for anxiety.  She did not appear to be in acute respiratory distress, she was not hypoxic on her baseline amount of oxygen and lab work was obtained for the broad differential as noted above.  The patient does not have any gross electrolyte derangement, there is a acute on chronic anemia but is not worse at this point and a leukopenia.  Her creatinine has been bumped to 4.82, GFR is down to 12.  She was initially hypertensive and received intramuscular pain medications, Benadryl, and subsequent evaluation shows slight improvement with administration of all medications and a small amount of antianxiety medication.  This x-ray without any fluid consolidations or acute pulmonary edema, I did call and talk to her nephrology team who agree that the patient does not acutely need emergent intervention/dialysis and would be okay to go home tonight with a.m. dialysis already planned.  When I discussed this with the patient again she is very anxious and required copious amounts of instruction in order to get her understanding that she is not having emergent cardiopulmonary process.    With the patient's hypotension and development of headache I did obtain a CT while I believe this is more likely related to her chronic narcotic use and anxiety it is reassuring that she does not have signs of increased intracranial pressure or any acute weakness or debilitation.  Her back pain is nonspecific, chronic in nature is not associated with development of fever.  She has no weakness, numbness or saddle paresthesias and I doubt that this is a central process or epidural abscess.  Is reassuring that patient's lactate is  normal.    She is currently feeling somewhat improved with regard to her pain though she does have profound anxiety about going home.  I talked to her extensively regarding discussion with her specialists and they want her to go to dialysis in the morning and have also called her mother feels more than capable and staying with her and providing her with ride to her dialysis in the morning as well.  When this is discussed with the patient they feel somewhat improved and she will follow-up as directed.  If she does have any interval changes, development of fevers or other interval complaints she may return to the emergency department or follow-up with her outpatient physician.    FINAL IMPRESSION  Visit Diagnoses     ICD-10-CM   1. Chronic dyspnea  R06.09   2. Stage 5 chronic kidney disease on chronic dialysis (HCC)  N18.6    Z99.2   3. Chronic low back pain without sciatica, unspecified back pain laterality  M54.50    G89.29   4. Nonintractable headache, unspecified chronicity pattern, unspecified headache type  R51.9     Electronically signed by: Manolo Whitfield M.D., 7/28/2022 10:15 PM

## 2022-07-29 NOTE — ED TRIAGE NOTES
26 yr old female to triage  Chief Complaint   Patient presents with   • Shortness of Breath     BIB [facility] from home report of shortness of breath; had dialysis done yesterday and only removed 1 liter of fluid in dialysis.     BP (!) 206/127   Pulse (!) 115   Temp 36.2 °C (97.2 °F) (Temporal)   Resp 16   Wt 52.2 kg (115 lb 1.3 oz)   LMP  (LMP Unknown)   SpO2 96%   BMI 18.02 kg/m²

## 2022-08-12 NOTE — ED TRIAGE NOTES
Chief Complaint   Patient presents with    Back Pain    Dizziness    Head Ache     Pt BIB [facility] from dialysis. Pt had dialysis today, 1.3 L removed, complains of above symptoms for  the past 3 days. Pt states she usually feels like this when her hemoglobin is low.

## 2022-08-12 NOTE — ED PROVIDER NOTES
"ED Provider Note    CHIEF COMPLAINT  Chief Complaint   Patient presents with    Back Pain    Dizziness    Head Ache       HPI  Lily Nicole is a 26 y.o. female who presents to the emergency department via EMS with complaint of chronic pain, anxiety and not feeling well.  She states that she has had chronic pain all over her whole body and was at dialysis today and she became extremely anxious about the condition she was in.  She was brought here by EMS.  Patient also states that she thinks she is anemic causing her symptoms.  The patient denies loss of sensation or strength to arms or legs, fever, cough, shortness of breath, no other symptoms.  She is asking for anxiety medication.  She does have a referral for psychiatry/psychology and is not taking medications for her anxiety.    REVIEW OF SYSTEMS  Positives as above. Pertinent negatives include loss of sensation or strength arms or legs, fever, shakes, chills, sweats, recent trauma  All other 10 review of systems are negative    PAST MEDICAL HISTORY  Past Medical History:   Diagnosis Date    Anemia 01/17/2018    AVF (arteriovenous fistula) ([facility])     Right Arm    Chest pain     Chest tightness     Daytime sleepiness     Dialysis patient ([facility]ly [facility])      dialysis, M,W,F Elizabeth/Joni    Difficulty breathing     ESRD (end stage renal disease) on dialysis ([facility]) 01/17/2018    Twan Fu for breath     Heart burn     Hypertension 01/17/2018    \"Controlled with medication\"    Indigestion     Lupus ([facility])     Migraines 01/17/2018    Painful breathing     Palpitations     Seizure ([facility]) 2013    from high blood pressure, reports 1 time event    Shortness of breath     Swelling of lower extremity     Wheezing        FAMILY HISTORY  Noncontributory    SOCIAL HISTORY  Social History     Socioeconomic History    Marital status: Single   Tobacco Use    Smoking status: Never    Smokeless tobacco: Never   Vaping Use    Vaping Use: Never used   Substance and Sexual Activity "    Alcohol use: No    Drug use: No       SURGICAL HISTORY  Past Surgical History:   Procedure Laterality Date    AL BRONCHOSCOPY,DIAGNOSTIC Bilateral 5/13/2021    Procedure: BRONCHOSCOPY, BRONCHOALVEOLAR LAVAGE;  Surgeon: William Spangler M.D.;  Location: [facility];  Service: Pulmonary    AL UPPER GI ENDOSCOPY,DIAGNOSIS N/A 3/19/2021    Procedure: GASTROSCOPY;  Surgeon: Waylon Mcqueen M.D.;  Location: [facility];  Service: Gastroenterology    AL UPPER GI ENDOSCOPY,CTRL BLEED  3/19/2021    Procedure: GASTROSCOPY, WITH ARGON PLASMA COAGULATION;  Surgeon: Waylon Mcqueen M.D.;  Location: [facility];  Service: Gastroenterology    AL UPPER GI ENDOSCOPY,DIAGNOSIS  3/5/2021    Procedure: GASTROSCOPY - W/HEMOSTASIS;  Surgeon: Ej Silva M.D.;  Location: [facility];  Service: Gastroenterology    AL COLONOSCOPY,DIAGNOSTIC  1/8/2021    Procedure: COLONOSCOPY;  Surgeon: Herbert Contreras M.D.;  Location: [facility];  Service: Gastroenterology    AL UPPER GI ENDOSCOPY,DIAGNOSIS  1/8/2021    Procedure: GASTROSCOPY;  Surgeon: Herbert Contreras M.D.;  Location: [facility];  Service: Gastroenterology    AL UPPER GI ENDOSCOPY,CTRL BLEED  1/8/2021    Procedure: GASTROSCOPY, WITH ARGON PLASMA COAGULATION;  Surgeon: Herbert Contreras M.D.;  Location: [facility];  Service: Gastroenterology    AL UPPER GI ENDOSCOPY,CTRL BLEED  11/12/2020    Procedure: GASTROSCOPY, WITH ARGON PLASMA COAGULATION;  Surgeon: Gadiel Whitney M.D.;  Location: [facility];  Service: Gastroenterology    AL UPPER GI ENDOSCOPY,BIOPSY  11/12/2020    Procedure: GASTROSCOPY, WITH BIOPSY;  Surgeon: Gadiel Whitney M.D.;  Location: [facility];  Service: Gastroenterology    GASTROSCOPY-ENDO  11/12/2020    Procedure: GASTROSCOPY;  Surgeon: Gadiel Whitney M.D.;  Location: [facility];  Service: Gastroenterology    GASTROSCOPY-ENDO  9/18/2020    Procedure: GASTROSCOPY;   "Surgeon: Gadiel Whitney M.D.;  Location: SUR[facility]ERY [facility];  Service: Gastroenterology    GASTROSCOPY N/A 5/30/2020    Procedure: GASTROSCOPY;  Surgeon: Waylon Mcqueen M.D.;  Location: [facility];  Service: Gastroenterology    GASTROSCOPY-ENDO  12/9/2019    Procedure: GASTROSCOPY;  Surgeon: Aaron Kam M.D.;  Location: [facility];  Service: Gastroenterology    ANGIOPLASTY  01/17/2018    \"Right Arm AV-Fistulagram & Angioplastyx3\"    ULI BY LAPAROSCOPY  4/5/2010    Performed by SYED MARTELL at SURGERY [facility] ORS    AV FISTULA CREATION Right     OTHER      renal biopsy x 3    OTHER      bone marrow biopsy       CURRENT MEDICATIONS  Home Medications    **Home medications have not yet been reviewed for this encounter**         ALLERGIES  Allergies   Allergen Reactions    Cephalexin Rash     Nausea and rash   Hives  .    Clindamycin Rash     Nausea and rash     Hive  .    Methylprednisolone      Anxious  Other reaction(s): Other-Reaction in Comments  Anxious    Tolerates prednisone     Metoprolol Rash and Nausea     Nausea and rash     Tolerates antenalol    Compazine      C/o anxiety    Fentanyl And Related Anxiety    Hydrocodone-Acetaminophen Rash and Nausea     Generalized rash  Generalized rash    Tolerates acetaminophen  Tolerates hydromorphone    Maxipime [Cefepime] Itching    Reglan [Metoclopramide] Anxiety    Tape Rash     Paper tape is ok       PHYSICAL EXAM  VITAL SIGNS: BP (!) 146/94   Pulse 99   Temp 36.4 °C (97.6 °F) (Temporal)   Resp 16   Wt 49.9 kg (110 lb)   SpO2 100%   BMI 17.23 kg/m²      Constitutional: Well developed, Well nourished, slight acute distress, Non-toxic appearance.   Eyes: PERRLA, EOMI, Conjunctiva normal, No discharge.   Cardiovascular: Tachycardic, Normal rhythm, No murmurs, No rubs, No gallops, and intact distal pulses.   Thorax & Lungs:  No respiratory distress, no rales, no rhonchi, No wheezing, No chest wall tenderness. "   Abdomen: Bowel sounds normal, Soft, No tenderness, No guarding, No rebound, No pulsatile masses.   Skin: Warm, Dry, No erythema, No rash.   Extremities: Full range of motion, no deformity, no edema, fistula right upper extremity, positive thrill.  Neurologic: Alert & oriented x 3, No focal deficits noted, acting appropriately on exam.  Psychiatric: Extremely anxious and tearful      LABORATORY/ECG  Results for orders placed or performed during the hospital encounter of 08/12/22   CBC WITH DIFFERENTIAL   Result Value Ref Range    WBC 3.4 (L) 4.8 - 10.8 K/uL    RBC 3.47 (L) 4.20 - 5.40 M/uL    Hemoglobin 9.2 (L) 12.0 - 16.0 g/dL    Hematocrit 31.1 (L) 37.0 - 47.0 %    MCV 89.6 81.4 - 97.8 fL    MCH 26.5 (L) 27.0 - 33.0 pg    MCHC 29.6 (L) 33.6 - 35.0 g/dL    RDW 67.1 (H) 35.9 - 50.0 fL    Platelet Count 187 164 - 446 K/uL    MPV 9.2 9.0 - 12.9 fL    Neutrophils-Polys 80.50 (H) 44.00 - 72.00 %    Lymphocytes 11.50 (L) 22.00 - 41.00 %    Monocytes 5.30 0.00 - 13.40 %    Eosinophils 0.90 0.00 - 6.90 %    Basophils 0.90 0.00 - 1.80 %    Immature Granulocytes 0.90 0.00 - 0.90 %    Nucleated RBC 0.00 /100 WBC    Neutrophils (Absolute) 2.74 2.00 - 7.15 K/uL    Lymphs (Absolute) 0.39 (L) 1.00 - 4.80 K/uL    Monos (Absolute) 0.18 0.00 - 0.85 K/uL    Eos (Absolute) 0.03 0.00 - 0.51 K/uL    Baso (Absolute) 0.03 0.00 - 0.12 K/uL    Immature Granulocytes (abs) 0.03 0.00 - 0.11 K/uL    NRBC (Absolute) 0.00 K/uL    Anisocytosis 1+     Macrocytosis 1+    CMP   Result Value Ref Range    Sodium 131 (L) 135 - 145 mmol/L    Potassium 3.7 3.6 - 5.5 mmol/L    Chloride 90 (L) 96 - 112 mmol/L    Co2 34 (H) 20 - 33 mmol/L    Anion Gap 7.0 7.0 - 16.0    Glucose 83 65 - 99 mg/dL    Bun 14 8 - 22 mg/dL    Creatinine 2.62 (H) 0.50 - 1.40 mg/dL    Calcium 8.7 8.4 - 10.2 mg/dL    AST(SGOT) 25 12 - 45 U/L    ALT(SGPT) 10 2 - 50 U/L    Alkaline Phosphatase 84 30 - 99 U/L    Total Bilirubin 0.4 0.1 - 1.5 mg/dL    Albumin 2.9 (L) 3.2 - 4.9 g/dL     Total Protein 9.1 (H) 6.0 - 8.2 g/dL    Globulin 6.2 (H) 1.9 - 3.5 g/dL    A-G Ratio 0.5 g/dL   ESTIMATED GFR   Result Value Ref Range    GFR (CKD-EPI) 25 (A) >60 mL/min/1.73 m 2   PLATELET ESTIMATE   Result Value Ref Range    Plt Estimation Normal    MORPHOLOGY   Result Value Ref Range    RBC Morphology Present    DIFFERENTIAL COMMENT   Result Value Ref Range    Comments-Diff see below          COURSE & MEDICAL DECISION MAKING  Pertinent Labs & Imaging studies reviewed. (See chart for details)  This is a 26-year-old female who presents with chronic pain, anxiety.  Here in the emergency department did review her previous visits and she has been here over 20 times last 2 years for similar complaints.  She is has been receiving Dilaudid multiple times here.  I do not believe I will be giving her narcotics secondary to her main complaint today is anxiety.  I have given her Valium IM and Benadryl IM as well.  On reevaluation, she does not have a significant anemia, her vital signs are stable, she has no evidence of hyperkalemia or need for emergent dialysis.  The patient's mother is at bedside and states she will take the patient home.  She did endorse that she had black stool 2 days ago but now states her stool is normal.      FINAL IMPRESSION     1. Anxiety    2. Other chronic pain          DISPOSITION:  Patient will be discharged home in stable condition.    FOLLOW UP:  Ella Matthew M.D.  [address] [facility] [address]  [phone]    Schedule an appointment as soon as possible for a visit              Electronically signed by: Emery Negron D.O., 8/12/2022 11:41 AM

## 2022-08-25 NOTE — ASSESSMENT & PLAN NOTE
Nephrologist Dr. Trent has been notified that patient has been admitted, anticipate continuing her usual MWF schedule.

## 2022-08-25 NOTE — ASSESSMENT & PLAN NOTE
Acute on chronic issue.  GI will scope, further mangement pending findings. PPI ggt. patient is requesting Tylenol and Benadryl prior to transfusion.      8/26: Patient will undergo EGD by GI today.  We appreciate further recommendations.    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  It seems that the patient was vomiting blood today multiple times.  We will monitor hemoglobin trend, will transfuse if needed.  We will monitor closely for now.

## 2022-08-25 NOTE — PROGRESS NOTES
4 Eyes Skin Assessment Completed by MICHELLE Bulalrd and Selina RN.    Head WDL  Ears WDL  Nose WDL  Mouth WDL  Neck WDL  Breast/Chest WDL  Shoulder Blades WDL  Spine WDL  (R) Arm/Elbow/Hand WDL Fistula in place  (L) Arm/Elbow/Hand WDL  Abdomen WDL  Groin WDL  Scrotum/Coccyx/Buttocks WDL  (R) Leg WDL  (L) Leg WDL  (R) Heel/Foot/Toe WDL  (L) Heel/Foot/Toe WDL          Devices In Places Blood Pressure Cuff, Pulse Ox, and Nasal Cannula      Interventions In Place NC W/Ear Foams, Q2 Turns, Heels Loaded W/Pillows, and Pressure Redistribution Mattress    Possible Skin Injury No    Pictures Uploaded Into Epic N/A  Wound Consult Placed N/A  RN Wound Prevention Protocol Ordered No

## 2022-08-25 NOTE — ED PROVIDER NOTES
ED Provider Note    Scribed for Alejandro Cyr M.D. by Selina Antonio. 8/25/2022  7:19 AM    Primary care provider: Ella Matthew M.D.  Means of arrival: Wheelchair  History obtained from: Patient  History limited by: None    CHIEF COMPLAINT  Chief Complaint   Patient presents with    Abdominal Pain     Onset yesterday of mid upper abd pain with bloody vomit       [facility]  Lily Nicole is a 26 y.o. female with a history of Lupus and end stage renal disease, who presents to the Emergency Department for moderate epigastric abdominal pain onset yesterday ago. Patient reports associated nausea and lightheadedness that began two days ago, with multiple episodes of vomiting since yesterday. Her last 2-3 episodes included the presence of slight blood. She recently had similar symptoms, an had an EGD which revealed esophagitis, gastritis, Karen-Keyes tear, and angiodysplasia of the stomach. She takes Pantoprazole daily. Patient last dialyzed yesterday, and sates it was a normal session for her. She denies constipation, diarrhea, melena, fevers, or chills.      REVIEW OF SYSTEMS  Pertinent positives include abdominal pain, nausea, vomiting, blood in vomit, lightheadedness.   Pertinent negatives include no constipation, diarrhea, melena, fevers, or chills.    All other systems reviewed and negative. See HPI for further details.       PAST MEDICAL HISTORY   has a past medical history of Anemia (01/17/2018), AVF (arteriovenous fistula) ([facility]), Chest pain, Chest tightness, Daytime sleepiness, Dialysis patient ([facility]), Difficulty breathing, ESRD (end stage renal disease) on dialysis ([facility]) (01/17/2018), Gasping for breath, Heart burn, Hypertension (01/17/2018), Indigestion, Lupus ([facility]), Migraines (01/17/2018), Painful breathing, Palpitations, Seizure ([facility]) (2013), Shortness of breath, Swelling of lower extremity, and Wheezing.    SURGICAL HISTORY   has a past surgical history that includes ronak by laparoscopy  (4/5/2010); av fistula creation (Right); angioplasty (01/17/2018); other; other; gastroscopy-endo (12/9/2019); gastroscopy (N/A, 5/30/2020); gastroscopy-endo (9/18/2020); upper gi endoscopy,ctrl bleed (11/12/2020); upper gi endoscopy,biopsy (11/12/2020); gastroscopy-endo (11/12/2020); colonoscopy,diagnostic (1/8/2021); upper gi endoscopy,diagnosis (1/8/2021); upper gi endoscopy,ctrl bleed (1/8/2021); upper gi endoscopy,diagnosis (3/5/2021); upper gi endoscopy,diagnosis (N/A, 3/19/2021); upper gi endoscopy,ctrl bleed (3/19/2021); and bronchoscopy,diagnostic (Bilateral, 5/13/2021).    SOCIAL HISTORY  Social History     Tobacco Use    Smoking status: Never    Smokeless tobacco: Never   Vaping Use    Vaping Use: Never used   Substance Use Topics    Alcohol use: No    Drug use: No      Social History     Substance and Sexual Activity   Drug Use No       FAMILY HISTORY  Family History   Problem Relation Age of Onset    Diabetes Paternal Grandmother        CURRENT MEDICATIONS  Home Medications       Reviewed by Alix Garcia R.N. (Registered Nurse) on 08/25/22 at 0709  Med List Status: Not Addressed     Medication Last Dose Status   amLODIPine (NORVASC) 5 MG Tab  Active   atenolol (TENORMIN) 25 MG Tab  Active   cloNIDine (CATAPRES) 0.2 MG/24HR PATCH WEEKLY patch  Active   cloNIDine (CATAPRES) 0.3 MG Tab  Active   HYDROmorphone (DILAUDID) 2 MG Tab  Active   hydroxychloroquine (PLAQUENIL) 200 MG Tab  Active   mycophenolate sodium (MYFORTIC) 360 MG Tablet Delayed Response tablet  Active   ondansetron (ZOFRAN ODT) 4 MG TABLET DISPERSIBLE  Active   pantoprazole (PROTONIX) 40 MG Tablet Delayed Response  Active   predniSONE (DELTASONE) 5 MG Tab  Active                    ALLERGIES  Allergies   Allergen Reactions    Cephalexin Rash     Nausea and rash   Hives  .    Clindamycin Rash     Nausea and rash     Hive  .    Methylprednisolone      Anxious  Other reaction(s): Other-Reaction in Comments  Anxious    Tolerates prednisone      "Metoprolol Rash and Nausea     Nausea and rash     Tolerates antenalol    Compazine      C/o anxiety    Fentanyl And Related Anxiety    Hydrocodone-Acetaminophen Rash and Nausea     Generalized rash  Generalized rash    Tolerates acetaminophen  Tolerates hydromorphone    Maxipime [Cefepime] Itching    Reglan [Metoclopramide] Anxiety    Tape Rash     Paper tape is ok       PHYSICAL EXAM  VITAL SIGNS: /81   Pulse 79   Temp 36.3 °C (97.3 °F) (Temporal)   Resp 16   Ht 1.702 m (5' 7\")   Wt 49.9 kg (110 lb)   LMP 07/25/2022 (Approximate)   SpO2 99%   BMI 17.23 kg/m²     Nursing note and vitals reviewed.  Constitutional: Chronically ill appearing. Mild distress secondary to pain.   HENT: Head is normocephalic and atraumatic. Oropharynx is clear and moist without exudate or erythema.   Eyes: Pupils are equal, round, and reactive to light. Conjunctiva are normal.   Cardiovascular: AV fistula in the right upper arm with palpable thrill. Normal rate and regular rhythm. No murmur heard. Normal radial pulses.  Pulmonary/Chest: Breath sounds normal. No wheezes or rales.   Abdominal: Mild tenderness in the epigastrium. Soft. No distention    Musculoskeletal: Extremities exhibit normal range of motion without edema or tenderness.   Neurological: Awake, alert and oriented to person, place, and time. No focal deficits noted.  Skin: Skin is warm and dry. No rash.   Psychiatric: Normal mood and affect. Appropriate for clinical situation.    DIAGNOSTIC STUDIES / PROCEDURES    LABS  Results for orders placed or performed during the hospital encounter of 08/25/22   CBC WITH DIFFERENTIAL   Result Value Ref Range    WBC 2.0 (L) 4.8 - 10.8 K/uL    RBC 2.32 (L) 4.20 - 5.40 M/uL    Hemoglobin 6.3 (L) 12.0 - 16.0 g/dL    Hematocrit 21.1 (L) 37.0 - 47.0 %    MCV 90.9 81.4 - 97.8 fL    MCH 27.2 27.0 - 33.0 pg    MCHC 29.9 (L) 33.6 - 35.0 g/dL    RDW 65.2 (H) 35.9 - 50.0 fL    Platelet Count 206 164 - 446 K/uL    MPV 10.6 9.0 - 12.9 " fL    Neutrophils-Polys 67.00 44.00 - 72.00 %    Lymphocytes 22.00 22.00 - 41.00 %    Monocytes 6.00 0.00 - 13.40 %    Eosinophils 0.00 0.00 - 6.90 %    Basophils 2.00 (H) 0.00 - 1.80 %    Nucleated RBC 0.00 /100 WBC    Neutrophils (Absolute) 1.40 (L) 2.00 - 7.15 K/uL    Lymphs (Absolute) 0.44 (L) 1.00 - 4.80 K/uL    Monos (Absolute) 0.12 0.00 - 0.85 K/uL    Eos (Absolute) 0.00 0.00 - 0.51 K/uL    Baso (Absolute) 0.04 0.00 - 0.12 K/uL    NRBC (Absolute) 0.00 K/uL    Hypochromia 1+     Anisocytosis 1+     Macrocytosis 1+    COMP METABOLIC PANEL   Result Value Ref Range    Sodium 129 (L) 135 - 145 mmol/L    Potassium 4.9 3.6 - 5.5 mmol/L    Chloride 88 (L) 96 - 112 mmol/L    Co2 31 20 - 33 mmol/L    Anion Gap 10.0 7.0 - 16.0    Glucose 82 65 - 99 mg/dL    Bun 37 (H) 8 - 22 mg/dL    Creatinine 3.41 (H) 0.50 - 1.40 mg/dL    Calcium 8.6 8.4 - 10.2 mg/dL    AST(SGOT) 24 12 - 45 U/L    ALT(SGPT) 10 2 - 50 U/L    Alkaline Phosphatase 85 30 - 99 U/L    Total Bilirubin 0.3 0.1 - 1.5 mg/dL    Albumin 2.8 (L) 3.2 - 4.9 g/dL    Total Protein 8.7 (H) 6.0 - 8.2 g/dL    Globulin 5.9 (H) 1.9 - 3.5 g/dL    A-G Ratio 0.5 g/dL   LIPASE   Result Value Ref Range    Lipase 29 7 - 58 U/L   HCG QUAL SERUM   Result Value Ref Range    Beta-Hcg Qualitative Serum Negative Negative   ESTIMATED GFR   Result Value Ref Range    GFR (CKD-EPI) 18 (A) >60 mL/min/1.73 m 2   DIFFERENTIAL MANUAL   Result Value Ref Range    Bands-Stabs 3.00 0.00 - 10.00 %    Manual Diff Status PERFORMED    PLATELET ESTIMATE   Result Value Ref Range    Plt Estimation Normal    MORPHOLOGY   Result Value Ref Range    RBC Morphology Present     Polychromia 1+      All labs reviewed by me.    COURSE & MEDICAL DECISION MAKING  Nursing notes, VS, PMSFHx reviewed in chart.     Review of past medical records shows the patient has a history of Lupus and end stage renal disease. She recently had an EGD, which revealed esophagitis, gastritis, Karen-Keyes tear, and angiodysplasia of  the stomach.    7:19 AM - Patient seen and examined at bedside. Patient is requesting analgesics and will be treated with Dilauded 1mg, Zofran 4mg, and Pantoprazole 80mg. Ordered CBC w/ differential, CMP, Lipase, and HCG Qual to evaluate her symptoms. The differential diagnoses include but are not limited to: Lupus, end stage renal disease, esophagitis, gastritis, Karen-Keyes tear, angiodysplasia, electrolyte abnormality, and viral syndrome.      9:08 AM - Review of patient's labs shows a 3-point hemoglobin drop to 6.3 compared to her last draw about two weeks ago.     9:13 - I have paged GI and the hospitalist.    9:16 AM - I discussed the patient's case and above findings with Dr. Wood (GI) who agrees to consult. He would like me to order a urine drug screen.     9:24 AM - I discussed the patient's case and the above findings with Dr. Melendez (Hospitalist) who agrees to evaluate the patient for admission.     Operatory studies remarkable for of significant anemia.  Relatively new compared to most recent value.  Patient has a history of similar.  GI will consult.  COD ordered.  Pending crossmatch.      DISPOSITION:  Patient will be hospitalized by Dr. Melendez in guarded condition.    FINAL IMPRESSION  1. Symptomatic anemia    2. ESRD on dialysis (HCC)    3. Gastrointestinal hemorrhage, unspecified gastrointestinal hemorrhage type          I, Selina Antonio (Gabrielle), am scribing for, and in the presence of, Alejandro Cyr M.D..    Electronically signed by: Selina Antonio (Donovanibe), 8/25/2022    IAlejandro M.D. personally performed the services described in this documentation, as scribed by Selina Antonio in my presence, and it is both accurate and complete.    The note accurately reflects work and decisions made by me.  Alejandro Cyr M.D.  8/25/2022  1:26 PM

## 2022-08-25 NOTE — ED NOTES
Lab called with critical result of Hgb 6.3, Hct 21.1, WBC 2.0 at 0851. Critical lab result read back to Lab.   Dr. Cyr notified of critical lab result at 0851.  Critical lab result read back by Dr. Cyr.

## 2022-08-25 NOTE — ED TRIAGE NOTES
Chief Complaint   Patient presents with    Abdominal Pain     Onset yesterday of mid upper abd pain with bloody vomit

## 2022-08-25 NOTE — CONSULTS
Gastroenterology Consult Note:    Parveen Wood M.D.  Date & Time note created:    8/25/2022   10:10 AM     Patient ID:  Name:             Lily Abdi  YOB: 1995  Age:                 26 y.o.  female  MRN:               [id number]    Referring MD:  Dr. Melendez                                                             Chief Complaint(s):      Hematemesis    History of Present Illness:    This is a very pleasant 26 y.o. female with the past medical history of Lupus (seeing Abdoulaye Wong) and end stage renal disease on dialysis (Renal transplant on hold due to lupus control), who presents to the Emergency Department for moderate epigastric abdominal pain since 8/24/22. Patient reports associated nausea and lightheadedness that began two days ago, with multiple episodes of vomiting since yesterday. Her last 2-3 episodes hematemesis. She had similar symptoms in 2020 and 2021, an had an EGD which revealed esophagitis, gastritis, Karen-Keyes tear, and angiodysplasia of the stomach. She takes Pantoprazole daily. Patient last dialyzed yesterday, and sates it was a normal session for her. She denies constipation, diarrhea, melena, fevers, or chills.    She also has chronic abdominal pain, history of gastric and duodenal angioectasias and chronic GI bleeding from these.  The patient denies NSAIDs. Otherwise the patient is doing fine without complaints of fever/chills/weight loss/appetite change/dysphagia/odynophagia/heartburn/bloating/constipation/diarrhea/melena/hematochezia or abdominal pain.    Review of Systems:      Constitutional: Denies fevers, weight changes  Eyes: Denies changes in vision, jaundice  Ears/Nose/Throat/Mouth: Denies nasal congestion or sore throat   Cardiovascular: Denies chest pain or palpitations   Respiratory: Denies shortness of breath, denies cough  Gastrointestinal/Hepatic: Mild abdominal pain, nausea, vomiting; denies diarrhea, constipation  Genitourinary: Denies  "dysuria or frequency  Musculoskeletal/Rheum: Denies  joint pain and swelling, edema  Skin: Denies rash  Neurological: Denies headache, confusion, memory loss or focal weakness/parasthesias  Psychiatric: denies mood disorder   Endocrine: Elizabeth thyroid problems  Heme/Oncology/Lymph Nodes: Denies enlarged lymph nodes, denies brusing or known bleeding disorder  All other systems were reviewed and are negative (AMA/CMS criteria)            ROS    Past Medical History:   Past Medical History:   Diagnosis Date    Anemia 01/17/2018    AVF (arteriovenous fistula) ([facility])     Right Arm    Chest pain     Chest tightness     Daytime sleepiness     Dialysis patient ([facility])      dialysis, M,W,F [facility]    Difficulty breathing     ESRD (end stage renal disease) on dialysis ([facility]) 01/17/2018    Twan Fu    Gasping for breath     Heart burn     Hypertension 01/17/2018    \"Controlled with medication\"    Indigestion     Lupus ([facility])     Migraines 01/17/2018    Painful breathing     Palpitations     Seizure ([facility]) 2013    from high blood pressure, reports 1 time event    Shortness of breath     Swelling of lower extremity     Wheezing      Active Hospital Problems    Diagnosis     GIB (gastrointestinal bleeding) [K92.2]     ESRD (end stage renal disease) ([facility]) [N18.6]        Past Surgical History:  Past Surgical History:   Procedure Laterality Date    IL BRONCHOSCOPY,DIAGNOSTIC Bilateral 5/13/2021    Procedure: BRONCHOSCOPY, BRONCHOALVEOLAR LAVAGE;  Surgeon: William Spangler M.D.;  Location: [facility];  Service: Pulmonary    IL UPPER GI ENDOSCOPY,DIAGNOSIS N/A 3/19/2021    Procedure: GASTROSCOPY;  Surgeon: Waylon Mcqueen M.D.;  Location: SUR[facility]ERY [facility];  Service: Gastroenterology    IL UPPER GI ENDOSCOPY,CTRL BLEED  3/19/2021    Procedure: GASTROSCOPY, WITH ARGON PLASMA COAGULATION;  Surgeon: Waylon Mcqueen M.D.;  Location: SURGERY [facility];  Service: Gastroenterology    IL UPPER GI ENDOSCOPY,DIAGNOSIS  " "3/5/2021    Procedure: GASTROSCOPY - W/HEMOSTASIS;  Surgeon: Ej Silva M.D.;  Location: [facility];  Service: Gastroenterology    PA COLONOSCOPY,DIAGNOSTIC  1/8/2021    Procedure: COLONOSCOPY;  Surgeon: Herbert Contreras M.D.;  Location: [facility];  Service: Gastroenterology    PA UPPER GI ENDOSCOPY,DIAGNOSIS  1/8/2021    Procedure: GASTROSCOPY;  Surgeon: Herbert Contreras M.D.;  Location: [facility];  Service: Gastroenterology    PA UPPER GI ENDOSCOPY,CTRL BLEED  1/8/2021    Procedure: GASTROSCOPY, WITH ARGON PLASMA COAGULATION;  Surgeon: Herbert Contreras M.D.;  Location: [facility];  Service: Gastroenterology    PA UPPER GI ENDOSCOPY,CTRL BLEED  11/12/2020    Procedure: GASTROSCOPY, WITH ARGON PLASMA COAGULATION;  Surgeon: Gadiel Whitney M.D.;  Location: [facility];  Service: Gastroenterology    PA UPPER GI ENDOSCOPY,BIOPSY  11/12/2020    Procedure: GASTROSCOPY, WITH BIOPSY;  Surgeon: Gadiel Whitney M.D.;  Location: [facility];  Service: Gastroenterology    GASTROSCOPY-ENDO  11/12/2020    Procedure: GASTROSCOPY;  Surgeon: Gadiel Whitney M.D.;  Location: [facility];  Service: Gastroenterology    GASTROSCOPY-ENDO  9/18/2020    Procedure: GASTROSCOPY;  Surgeon: Gadiel Whitney M.D.;  Location: [facility];  Service: Gastroenterology    GASTROSCOPY N/A 5/30/2020    Procedure: GASTROSCOPY;  Surgeon: Waylon Mcqueen M.D.;  Location: [facility];  Service: Gastroenterology    GASTROSCOPY-ENDO  12/9/2019    Procedure: GASTROSCOPY;  Surgeon: Aaron Kam M.D.;  Location: [facility];  Service: Gastroenterology    ANGIOPLASTY  01/17/2018    \"Right Arm AV-Fistulagram & Angioplastyx3\"    ULI BY LAPAROSCOPY  4/5/2010    Performed by SYED MARTELL at SURGERY [facility] ORS    AV FISTULA CREATION Right     OTHER      renal biopsy x 3    OTHER      bone marrow biopsy       Hospital Medications:  Current " Facility-Administered Medications   Medication Dose Frequency Provider Last Rate Last Admin    NS infusion   Continuous Jorge Melendez M.D.        diphenhydrAMINE (BENADRYL) injection 12.5 mg  12.5 mg On Call Jorge Melendez M.D.        [START ON 8/26/2022] acetaminophen (Tylenol) tablet 650 mg  650 mg On Call Jorge Melendez M.D.        senna-docusate (PERICOLACE or SENOKOT S) 8.6-50 MG per tablet 2 Tablet  2 Tablet BID Jorge Melendez M.D.        And    polyethylene glycol/lytes (MIRALAX) PACKET 1 Packet  1 Packet QDAY PRN Jorge Melendez M.D.        And    magnesium hydroxide (MILK OF MAGNESIA) suspension 30 mL  30 mL QDAY PRN Jorge Melendez M.D.        And    bisacodyl (DULCOLAX) suppository 10 mg  10 mg QDAY PRN Jorge Melendez M.D.        ondansetron (ZOFRAN) syringe/vial injection 4 mg  4 mg Q4HRS PRN Jorge Melendez M.D.        MD Alert...Total Body Iron Replacement per Pharmacy   PHARMACY TO DOSE Parveen Wood M.D.       Last reviewed on 8/25/2022  9:40 AM by Shefali Guzman     Current Outpatient Medications:  (Not in a hospital admission)      Medication Allergy:  Allergies   Allergen Reactions    Cephalexin Rash     Nausea and rash   Hives  .    Clindamycin Rash     Nausea and rash     Hive  .    Hydrocodone Rash and Nausea     Rash      Maxipime [Cefepime] Itching    Methylprednisolone Unspecified     Anxious      Tolerates prednisone     Metoprolol Rash and Nausea     Nausea and rash     Tolerates antenalol    Compazine Anxiety    Fentanyl And Related Anxiety    Metoclopramide Anxiety    Tape Rash     Paper tape is ok       Family History:  Family History   Problem Relation Age of Onset    Diabetes Paternal Grandmother        Social History:  Social History     Socioeconomic History    Marital status: Single     Spouse name: Not on file    Number of children: Not on file    Years of education: Not on file    Highest education level: Not on file   Occupational History    Not on file   Tobacco  "Use    Smoking status: Never    Smokeless tobacco: Never   Vaping Use    Vaping Use: Never used   Substance and Sexual Activity    Alcohol use: No    Drug use: No    Sexual activity: Not on file   Other Topics Concern    Not on file   Social History Narrative    Not on file     Social Determinants of Health     Financial Resource Strain: Not on file   Food Insecurity: Not on file   Transportation Needs: Not on file   Physical Activity: Not on file   Stress: Not on file   Social Connections: Not on file   Intimate Partner Violence: Not on file   Housing Stability: Not on file       Physical Exam:  Weight/BMI: Body mass index is 17.23 kg/m².  /72   Pulse 69   Temp 36.3 °C (97.3 °F) (Temporal)   Resp 16   Ht 1.702 m (5' 7\")   Wt 49.9 kg (110 lb)   SpO2 100%   Vitals:    08/25/22 0655 08/25/22 0720 08/25/22 0850 08/25/22 0920   BP: 120/81 127/85 (!) 95/61 108/72   Pulse: 79 76 70 69   Resp: 16      Temp: 36.3 °C (97.3 °F)      TempSrc: Temporal      SpO2: 99%  100% 100%   Weight: 49.9 kg (110 lb)      Height: 1.702 m (5' 7\")        Oxygen Therapy:  Pulse Oximetry: 100 %, O2 (LPM): 3, O2 Delivery Device: Nasal Cannula  No intake or output data in the 24 hours ending 08/25/22 1010  Physical Exam     Constitutional:   Well developed, well nourished, no acute distress. Chronic ill looking, thin  HEENT:  Normocephalic, Atraumatic, Conjunctiva not pale, Sclera not icteric, Oropharynx moist mucous membranes, No oral exudates, Nose normal.  No thyromegaly.  Neck:  Normal range of motion, No cervical tenderness,  no JVD.  Chest/Lungs:  Symmetric expansion, no spider angioma, breath sounds clear to auscultation bilaterally,  no crackles, no wheezing.   Cardiovascular:  Normal heart rate, Normal rhythm, No murmurs, No rubs, No gallops.    Abdomen: Bowel sounds normal, Soft, No tenderness, No guarding, No rebound, No masses, No hepatosplenomegaly.  Extremities: No cyanosis/clubbing/edema/palmar erythema/flapping " tremor  Skin: Warm, Dry, No erythema, No rash, no induration.    MDM (Data Review):     Records reviewed and summarized in current documentation    Lab Data Review:  Recent Results (from the past 24 hour(s))   CBC WITH DIFFERENTIAL    Collection Time: 08/25/22  7:44 AM   Result Value Ref Range    WBC 2.0 (L) 4.8 - 10.8 K/uL    RBC 2.32 (L) 4.20 - 5.40 M/uL    Hemoglobin 6.3 (L) 12.0 - 16.0 g/dL    Hematocrit 21.1 (L) 37.0 - 47.0 %    MCV 90.9 81.4 - 97.8 fL    MCH 27.2 27.0 - 33.0 pg    MCHC 29.9 (L) 33.6 - 35.0 g/dL    RDW 65.2 (H) 35.9 - 50.0 fL    Platelet Count 206 164 - 446 K/uL    MPV 10.6 9.0 - 12.9 fL    Neutrophils-Polys 67.00 44.00 - 72.00 %    Lymphocytes 22.00 22.00 - 41.00 %    Monocytes 6.00 0.00 - 13.40 %    Eosinophils 0.00 0.00 - 6.90 %    Basophils 2.00 (H) 0.00 - 1.80 %    Nucleated RBC 0.00 /100 WBC    Neutrophils (Absolute) 1.40 (L) 2.00 - 7.15 K/uL    Lymphs (Absolute) 0.44 (L) 1.00 - 4.80 K/uL    Monos (Absolute) 0.12 0.00 - 0.85 K/uL    Eos (Absolute) 0.00 0.00 - 0.51 K/uL    Baso (Absolute) 0.04 0.00 - 0.12 K/uL    NRBC (Absolute) 0.00 K/uL    Hypochromia 1+     Anisocytosis 1+     Macrocytosis 1+    COMP METABOLIC PANEL    Collection Time: 08/25/22  7:44 AM   Result Value Ref Range    Sodium 129 (L) 135 - 145 mmol/L    Potassium 4.9 3.6 - 5.5 mmol/L    Chloride 88 (L) 96 - 112 mmol/L    Co2 31 20 - 33 mmol/L    Anion Gap 10.0 7.0 - 16.0    Glucose 82 65 - 99 mg/dL    Bun 37 (H) 8 - 22 mg/dL    Creatinine 3.41 (H) 0.50 - 1.40 mg/dL    Calcium 8.6 8.4 - 10.2 mg/dL    AST(SGOT) 24 12 - 45 U/L    ALT(SGPT) 10 2 - 50 U/L    Alkaline Phosphatase 85 30 - 99 U/L    Total Bilirubin 0.3 0.1 - 1.5 mg/dL    Albumin 2.8 (L) 3.2 - 4.9 g/dL    Total Protein 8.7 (H) 6.0 - 8.2 g/dL    Globulin 5.9 (H) 1.9 - 3.5 g/dL    A-G Ratio 0.5 g/dL   LIPASE    Collection Time: 08/25/22  7:44 AM   Result Value Ref Range    Lipase 29 7 - 58 U/L   HCG QUAL SERUM    Collection Time: 08/25/22  7:44 AM   Result Value Ref  Range    Beta-Hcg Qualitative Serum Negative Negative   ESTIMATED GFR    Collection Time: 08/25/22  7:44 AM   Result Value Ref Range    GFR (CKD-EPI) 18 (A) >60 mL/min/1.73 m 2   DIFFERENTIAL MANUAL    Collection Time: 08/25/22  7:44 AM   Result Value Ref Range    Bands-Stabs 3.00 0.00 - 10.00 %    Manual Diff Status PERFORMED    PLATELET ESTIMATE    Collection Time: 08/25/22  7:44 AM   Result Value Ref Range    Plt Estimation Normal    MORPHOLOGY    Collection Time: 08/25/22  7:44 AM   Result Value Ref Range    RBC Morphology Present     Polychromia 1+    COD - Adult (Type and Screen)    Collection Time: 08/25/22  7:44 AM   Result Value Ref Range    ABO Grouping Only O     Rh Grouping Only POS     Antibody Screen-Cod NEG        MDM (Assessment and Plan):     Active Hospital Problems    Diagnosis     GIB (gastrointestinal bleeding) [K92.2]     ESRD (end stage renal disease) ([facility]) [N18.6]        Imaging/Procedures Review:    [id number] [id number]    (Results Pending)       Assessment  - Hematemesis  - H/o M-W tear  - Esophagitis  - GI AVM  - ESRD on HD  - Lupus    Plan  - KUB to check stool load  - Urine drug screen  -  Admission to the hospital by the hospitalist with gastroenterology following closely. Hospitalist to manage comorbid conditions.  -  Pantoprazole 80 mg IV bolus followed by 8 mg IV per hour.  -  Cl liq diet, no red  -  NPO after midnight  -  Monitor H/H and vitals. Serial hemoglobin and hematocrit q 6-8 hours with transfusions as needed per primary team for hemoglobin less than 7 or hematocrit less than 21.  -  EGD with hemostasis in the morning    Risks, benefits, and alternatives were discussed with patient. Consenting persons were given an opportunity to ask questions and discuss other options. Risks including but not limited to failed or incomplete endoscopy, ineffective therapy, perforation, infection, bleeding, missed lesion(s), cardiac and/or pulmonary event, aspiration, stroke, possible need for  surgery, hospitalization possibly prolonged, discomfort, unsuccessful and/or incomplete procedure, possible need for repeat procedures and/or additional testings, damage to adjacent organs and/or vascular structures, medication reaction, disability, death, and other adverse events possibly life-threatening. Discussion was undertaken with Layman's terms. Consenting persons stated understanding and acceptance of these risks, and wished to proceed. Consent was given in clear state of mind. All questions were answered.      Thank you very much for allowing me to participate in the care of your patient.  Please feel free to contact me anytime at [phone].     Parveen Wood M.D.    Core Quality Measures   Reviewed items::  Labs, Medications and Radiology reports reviewed

## 2022-08-25 NOTE — H&P
Hospital Medicine History & Physical Note    Date of Service  8/25/2022    Primary Care Physician  Ella Matthew M.D.    Consultants  Dr. Trent, nephrology  Dr. Wood, Gastroenterology    Code Status  Full Code    Chief Complaint  Chief Complaint   Patient presents with    Abdominal Pain     Onset yesterday of mid upper abd pain with bloody vomit       History of Presenting Illness  Patient is a 26-year-old woman with a history of end-stage renal disease on Monday Wednesday Friday dialysis secondary to lupus.  She has a history of chronic anemia and receives Epogen with dialysis under the care of her nephrologist Dr. Trent.  She has somewhat acute on chronic nausea and vomiting issues, progressing to hematemesis last night and this morning for which she presented to the emergency department for further evaluation.  Hemoglobin was observed to be 6.2 on labs.  She has received multiple transfusions in the past and is somewhat difficult to type and screen due to antibody issues.  She is requesting Benadryl and Tylenol pretransfusion.  Case was discussed with GI, given her history of Karen-Keyes tears and gastritis in the past she will be provisioned with a PPI drip and EGD will be performed.    Acuity is acute, severity is severe, location is GI, timing is irrelevant, associated symptoms are nausea with emesis, onset is abrupt, no particular alleviating or exacerbating factors, course is progressive.    Review of Systems  All systems reviewed and negative except as noted per above.    Past Medical History   has a past medical history of Anemia (01/17/2018), AVF (arteriovenous fistula) ([facility]), Chest pain, Chest tightness, Daytime sleepiness, Dialysis patient ([facility]), Difficulty breathing, ESRD (end stage renal disease) on dialysis ([facility]) (01/17/2018), Gasping for breath, Heart burn, Hypertension (01/17/2018), Indigestion, Lupus ([facility]), Migraines (01/17/2018), Painful breathing, Palpitations, Seizure ([facility]) (2013),  Shortness of breath, Swelling of lower extremity, and Wheezing.    Surgical History   has a past surgical history that includes ronak by laparoscopy (4/5/2010); av fistula creation (Right); angioplasty (01/17/2018); other; other; gastroscopy-endo (12/9/2019); gastroscopy (N/A, 5/30/2020); gastroscopy-endo (9/18/2020); pr upper gi endoscopy,ctrl bleed (11/12/2020); pr upper gi endoscopy,biopsy (11/12/2020); gastroscopy-endo (11/12/2020); pr colonoscopy,diagnostic (1/8/2021); pr upper gi endoscopy,diagnosis (1/8/2021); pr upper gi endoscopy,ctrl bleed (1/8/2021); pr upper gi endoscopy,diagnosis (3/5/2021); pr upper gi endoscopy,diagnosis (N/A, 3/19/2021); pr upper gi endoscopy,ctrl bleed (3/19/2021); and pr bronchoscopy,diagnostic (Bilateral, 5/13/2021).     Family History  family history includes Diabetes in her paternal grandmother.       Social History   reports that she has never smoked. She has never used smokeless tobacco. She reports that she does not drink alcohol and does not use drugs.    Allergies  Allergies   Allergen Reactions    Cephalexin Rash     Nausea and rash   Hives  .    Clindamycin Rash     Nausea and rash     Hive  .    Hydrocodone Rash and Nausea     Rash      Maxipime [Cefepime] Itching    Methylprednisolone Unspecified     Anxious      Tolerates prednisone     Metoprolol Rash and Nausea     Nausea and rash     Tolerates antenalol    Compazine Anxiety    Fentanyl And Related Anxiety    Metoclopramide Anxiety    Tape Rash     Paper tape is ok       Medications  Prior to Admission Medications   Prescriptions Last Dose Informant Patient Reported? Taking?   HYDROmorphone (DILAUDID) 2 MG Tab 2 WEEKS at PRN Patient Yes No   Sig: Take 2 mg by mouth 1 time a day as needed for Severe Pain.   amLODIPine (NORVASC) 5 MG Tab 8/24/2022 at PM Patient Yes No   Sig: Take 5 mg by mouth at bedtime.   atenolol (TENORMIN) 25 MG Tab 8/24/2022 at AM Patient Yes No   Sig: Take 25 mg by mouth every morning.    cloNIDine (CATAPRES) 0.2 MG/24HR PATCH WEEKLY patch 8/18/2022 at UNK Patient Yes No   Sig: Place 1 Patch on the skin every thursday.   cloNIDine (CATAPRES) 0.3 MG Tab 8/24/2022 at PM Patient Yes No   Sig: Take 0.3 mg by mouth 2 times a day.   hydroxychloroquine (PLAQUENIL) 200 MG Tab 8/24/2022 at HS Patient Yes No   Sig: Take 200 mg by mouth at bedtime.   mycophenolate sodium (MYFORTIC) 360 MG Tablet Delayed Response tablet 8/24/2022 at HS Patient Yes No   Sig: Take 360 mg by mouth at bedtime.   ondansetron (ZOFRAN ODT) 4 MG TABLET DISPERSIBLE 8/24/2022 at PM Patient Yes No   Sig: Take 4 mg by mouth every 6 hours as needed for Nausea.   pantoprazole (PROTONIX) 40 MG Tablet Delayed Response 8/24/2022 at PM Patient No No   Sig: Take 1 tablet by mouth 2 times a day.   predniSONE (DELTASONE) 5 MG Tab 8/24/2022 at AM Patient Yes No   Sig: Take 5 mg by mouth every morning.      Facility-Administered Medications: None       Physical Exam  Temp:  [36.3 °C (97.3 °F)] 36.3 °C (97.3 °F)  Pulse:  [69-79] 70  Resp:  [16] 16  BP: ()/(61-85) 102/68  SpO2:  [99 %-100 %] 100 %  Blood Pressure: 102/68   Temperature: 36.3 °C (97.3 °F)   Pulse: 70   Respiration: 16   Pulse Oximetry: 100 %     General appearance: NAD, conversant, family at bedside  Eyes: anicteric sclerae, moist conjunctivae; no lid-lag; PERRLA  HENT: Atraumatic; oropharynx clear with moist mucous membranes and no mucosal ulcerations; normal hard and soft palate  Neck: Trachea midline; FROM, supple, no thyromegaly or lymphadenopathy  Lungs: CTA, with normal respiratory effort and no intercostal retractions  CV: RRR, no MRGs  Abdomen: Soft, diffusely tender to palpitation, no masses or HSM  Extremities: Fistula intact with palpable thrill to the right upper extremity,  Skin: Normal temperature, turgor and texture; no rash, ulcers or subcutaneous nodules  Psych: Appropriate affect, alert and oriented to person, place and time      Laboratory:  Recent Labs      08/25/22  0744   WBC 2.0*   RBC 2.32*   HEMOGLOBIN 6.3*   HEMATOCRIT 21.1*   MCV 90.9   MCH 27.2   MCHC 29.9*   RDW 65.2*   PLATELETCT 206   MPV 10.6     Recent Labs     08/25/22  0744   SODIUM 129*   POTASSIUM 4.9   CHLORIDE 88*   CO2 31   GLUCOSE 82   BUN 37*   CREATININE 3.41*   CALCIUM 8.6     Recent Labs     08/25/22  0744   ALTSGPT 10   ASTSGOT 24   ALKPHOSPHAT 85   TBILIRUBIN 0.3   LIPASE 29   GLUCOSE 82         No results for input(s): NTPROBNP in the last 72 hours.      No results for input(s): TROPONINT in the last 72 hours.    Imaging:  [id number] VIEW    (Results Pending)       no X-Ray or EKG requiring interpretation    Assessment/Plan:  Justification for Admission Status  I anticipate greater than 2 midnights active treatment, inpatient status accordingly.    * GIB (gastrointestinal bleeding)- (present on admission)  Assessment & Plan  Acute on chronic issue.  GI will scope, further mangement pending findings. PPI ggt. patient is requesting Tylenol and Benadryl prior to transfusion.      Anemia associated with acute blood loss- (present on admission)  Assessment & Plan  Likely mixed chronic disease anemia given dialysis, provide 1 unit PRBCs now once crossmatch complete.  Recheck posttransfusion    ESRD (end stage renal disease) ([facility])  Assessment & Plan  Nephrologist Dr. Trent has been notified that patient has been admitted, anticipate continuing her usual MWF schedule.      VTE prophylaxis: pharmacologic prophylaxis contraindicated due to GIB    Addendum, 3:30 PM  Patient developed worsening abdominal discomfort, hypertension (232/137), hypoglycemia, and recurrent hematemesis after blood transfusion was initiated.  While this is not classic for transfusion reaction I did feel it would be prudent to stop the blood, recheck labs and try transfusion again with a fresh sample.

## 2022-08-25 NOTE — ED NOTES
Pharmacy Medication Reconciliation      ~Medication reconciliation updated and complete per patient at bedside  ~Allergies have been verified and updated   ~No oral ABX within the last 30 days  ~Patient home pharmacy: [facility]    ~Patient reports she took off her Clonidine patch this morning 08/25/2022

## 2022-08-25 NOTE — ASSESSMENT & PLAN NOTE
Likely mixed chronic disease anemia given dialysis.    8/26: The patient will undergo EGD by GI today, we appreciate further recommendations. Monitor Hb.    8/27: Nursing called me during the morning to let him know that the patient was vomiting blood.  Hemoglobin has remained stable, will monitor hemoglobin for now.  We did update GI, we appreciate further recommendations.

## 2022-08-26 NOTE — OR NURSING
"ESOPHAGOGASTRODUODENOSCOPY  Parveen Wood M.D.  --------------------------------------------------------------------------------------------  1011: To PACU from [facility] via rrogelio. Respirations spontaneous and non-labored via OPA.  Pt belongings are is her patient room.     1018: OPA Dc'd. Pt opens eyes spontaneously. Drowsy. Pt placed on 4L O2 via NC -- pt states this is what she is on 24/7.      1020: Pt appears to be resting comfortably; VSS.  No s/sx respiratory distress.  Pt c/o generalized pain.     1035: Dr. Van alonzo's order for zofran 4mg IV x1 dose.     1045:  Zofran IV given for nausea.    Labetolol given for HBP.    Dr. Wood rounded on patient; pt to be placed on clears today.    Tried to call patient's mother; goes straight to voice message stating her mailbox is not set up.  Pt had small \"snotty\" looking soft brown stool.      1055:  Pt states nausea has decreased, generalized pain. BP still elevated.     1100:  Moderate sized soft brown stool. Ice pack given because pt states she is hot.      1110:  SBP < 170.  Pt appears to be resting comfortably.  Respirations spontaneous and non-labored. Meets criteria to transfer to Stage floor.   Report called to Tele RN.    1125: No changes.    1135: Pt transferred back to her patient room on 4L O2 via NC.                          "

## 2022-08-26 NOTE — ANESTHESIA PREPROCEDURE EVALUATION
Case: [id number] Date/Time: 08/26/22 1015    Procedure: ESOPHAGOGASTRODUODENOSCOPY    Location:  ENDOSCOPIC ULTRASOUND ROOM / SURGERY [facility]    Surgeons: Parveen Wood M.D.          Relevant Problems   PULMONARY   (positive) Shortness of breath      NEURO   (positive) Chronic migraine without aura without status migrainosus, not intractable      CARDIAC   (positive) Arteriovenous fistula, acquired (HCC)   (positive) Chronic migraine without aura without status migrainosus, not intractable   (positive) HTN (hypertension)   (positive) Hemorrhagic gastritis with hematemesis    (positive) Hypertensive urgency   (positive) Renovascular hypertension      GI   (positive) Gastroesophageal reflux disease without esophagitis         (positive) ESRD (end stage renal disease) (HCC)   (positive) SLE glomerulonephritis syndrome (HCC)       Physical Exam    Airway   Mallampati: II  TM distance: >3 FB  Neck ROM: full       Cardiovascular - normal exam  Rhythm: regular  Rate: normal  (-) murmur     Dental - normal exam           Pulmonary - normal exam  Breath sounds clear to auscultation     Abdominal    Neurological - normal exam                 Anesthesia Plan    ASA 3       Plan - MAC               Induction: intravenous    Postoperative Plan: Postoperative administration of opioids is intended.    Pertinent diagnostic labs and testing reviewed    Informed Consent:    Anesthetic plan and risks discussed with patient.    Use of blood products discussed with: patient whom consented to blood products.

## 2022-08-26 NOTE — ANESTHESIA TIME REPORT
Anesthesia Start and Stop Event Times     Date Time Event    8/26/2022 0920 Ready for Procedure     0940 Anesthesia Start     1021 Anesthesia Stop        Responsible Staff  08/26/22    Name Role Begin End    Tai Araujo D.O. Anesth 0940 1021        Overtime Reason:  no overtime (within assigned shift)    Comments:

## 2022-08-26 NOTE — PROGRESS NOTES
Pt had nausea and vomiting again with hematemesis.   H/H stable.  Showed the pt and her mom KUB findings.   Lots of stool.  Advise Fleet enema X 2, then Moviprep. Keep EGD for tomorrow.   All questions were answered. Dr. Melendez was notified.

## 2022-08-26 NOTE — PROGRESS NOTES
"[facility] Note:    Hemodialysis/PUF treatment ordered today per Dr Trent x 2 hours. Treatment initiated at 1900, ended at 2050.   Pt was hysterically anxious and BS : 61 prior to HD, primary RN medicated Pt at bedside. Right before start of HD, Pt stated \"Im not feeling well, Im about to pass out\", BS rechecked : 150 , Primary RN notified, rapid response was called, Dr Trent updated over the phone, still okay to proceed with HD if Pt permits due to SOB. Pt agreed to do PUF.    Patient requested to increase Net UF to 2.7L during Tx but 10 min before end of tx , Pt c/o dizziness and became restless, HD terminated and shortened by 10 min, blood returned, post tx BP : 108/83; see paper flow sheet for details.     Net UF 2,250 mL.     Needles removed from access site. Dressings applied and sites held x 10 minutes; verified no bleeding. Positive bruit/thrill post tx. Staff RN to monitor AVF for breakthrough bleeding. Should breakthrough bleeding occur, staff RN to apply pressure to access sites until bleeding resolved. Notify Dialysis and Nephrologist for follow-up.    Report given to Primary RN.      "

## 2022-08-26 NOTE — CONSULTS
DATE OF SERVICE:  08/25/2022     NEPHROLOGY CONSULTATION     Consult at the request of Jorge Melendez MD     REASON FOR CONSULTATION:  To evaluate and provide dialysis for patient with   end-stage renal disease.     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  Last dialysis completed yesterday,   presented to the emergency room with nausea, vomiting, lightheadedness,   hematemesis.  Has ____ history of esophagitis, gastritis, Karen-Keyes tears   or angiodysplasia.  Currently, the patient is still vomiting blood.  She is   dizzy, not feeling well, short of breath with elevated blood pressure.     REVIEW OF SYSTEMS:  GENERAL:  Positive for fatigue.  No fever or chills.  HENT:  No nosebleeds, no sinus pain, no sore throat.  EYES:  No double or blurry vision, no eye pain.  NECK:  No pain, no stiffness ____.  RESPIRATORY:  No shortness of breath, no cough, no hemoptysis.  CARDIOVASCULAR:  No edema, no chest pain.  Positive for dyspnea on exertion.  GASTROINTESTINAL:  Positive for nausea, vomiting blood and lightheadedness.    No diarrhea.  Positive abdominal pain.  All other systems reviewed, all negative.     PAST MEDICAL HISTORY:  End-stage renal disease, on hemodialysis; systemic   lupus erythematosus, anemia, gastritis, migraines, hypertension, seizure   spells.     PAST SURGICAL HISTORY:  Laparoscopy, AV fistula creation, angioplasty,   gastroscopy, colonoscopy.     FAMILY HISTORY:  Positive for diabetes mellitus type 2.  No history of lupus.     SOCIAL HISTORY:  No tobacco, alcohol or drug use.     ALLERGIES:  ALLERGIC TO KEFLEX, CLINDAMYCIN, METHYLPREDNISOLONE, METOPROLOL,   COMPAZINE, FENTANYL, HYDROCODONE, CEFEPIME, REGLAN, TAPE.     OUTPATIENT/INPATIENT MEDICATIONS:  Reviewed.      PHYSICAL EXAMINATION:  VITAL SIGNS:  Blood pressure 160/116, heart rate 98, temperature 36.7 Celsius.  GENERAL APPEARANCE:  Well-developed, thin female, in no acute distress.  HEENT:  Normocephalic, atraumatic.  Pupils equal, round, reactive to light.    Extraocular movements intact.  Nares patent.  Oropharynx, moist mucosa, no   erythema or exudate.  NECK:  Supple.  No lymphadenopathy, no thyromegaly appreciated.  LUNGS:  Clear to auscultation bilaterally.  No wheezes, no rhonchi.  HEART:  Regular rhythm. No rub or gallop.  ABDOMEN:  Tender to palpation in epigastric area.  No palpable mass.  Bowel   sounds present.  EXTREMITIES:  No cyanosis, no clubbing, no edema.  NEUROLOGIC:  Alert, oriented x3, no focal deficit.  Cranial nerves II through   XII grossly intact.  SKIN:  Dry.  No erythema or rash.     LABORATORY DATA:  Laboratory results reviewed, revealed hemoglobin level 6.3.    Sodium 129, potassium 4.9, BUN 37, creatinine 3.41.     ASSESSMENT AND PLAN:  The patient is a 26-year-old female with long-term   history of systemic lupus erythematosus, end-stage renal disease secondary to   lupus nephritis, admitted with gastrointestinal bleeding.  1.  End-stage renal disease.  We will continue hemodialysis per the patient's   schedule Monday, Wednesday and Friday.  2.  Electrolytes: Hyponatremia, likely due to hypervolemia.  To increase   ultrafiltration with hemodialysis, to add additional treatment today with pure   ultrafiltration.  3.  Hypertension, elevated BP.  We will provide additional treatment today for   better volume control which should improve blood pressure control.  4.  Anemia, severe.  This is due to gastrointestinal bleeding.  Receiving   blood transfusion and iron.  5.  Volume: As mentioned above overload.  We will provide additional treatment   tonight.     RECOMMENDATIONS:  1.  Pure ultrafiltration tonight.  2.  Continue hemodialysis Monday, Wednesday and Friday schedule.  3.  No heparin.  4.   To monitor closely hemoglobin level, basic metabolic panel.  5.  Continue blood transfusion.  6.  Adjust all medications to renal dose.     Above plan was discussed with Dr. Jogre Melendez.  Thank you for the consult.        ______________________________  MD ROSANNA MOREIRA/MITCHELL/DIA    DD:  08/25/2022 14:50  DT:  08/25/2022 17:32    Job#:  [id number]

## 2022-08-26 NOTE — PROGRESS NOTES
"1455: pt complaining of feeling \"hot and dizzy and sick\" stopped blood at this time and pt was found to be hypertensive. Blood sugar 51. Rapid called. pt was given D10 IV per MAR sugar came up to 66. Pt vomiting 200ml dark red bloody emesis. MD Nora at bedside. Blood sent down to blood bank and transfusion reaction sheet filled out and given to Daphne. Multiple attempts to start another IV, pt needs a second line. PICC RN x2 not able to attempt new IV d/t PICC line placement schedule.   "

## 2022-08-26 NOTE — ANESTHESIA POSTPROCEDURE EVALUATION
Patient: Lily Nicole    Procedure Summary     Date: 08/26/22 Room / Location:  ENDOSCOPIC ULTRASOUND ROOM / SURGERY [facility]    Anesthesia Start: 0940 Anesthesia Stop: 1021    Procedures:       ESOPHAGOGASTRODUODENOSCOPY (Abdomen)      GASTROSCOPY, WITH ARGON PLASMA COAGULATION (Abdomen) Diagnosis: (Hematemesis, severe anemia)    Surgeons: Parveen Wood M.D. Responsible Provider: Tai Araujo D.O.    Anesthesia Type: MAC ASA Status: 3          Final Anesthesia Type: MAC  Last vitals  BP   Blood Pressure: (!) 164/111 (rn notified)    Temp   36.9 °C (98.4 °F)    Pulse   85   Resp   18    SpO2   99 %      Anesthesia Post Evaluation    Patient location during evaluation: PACU  Patient participation: complete - patient participated  Level of consciousness: awake and alert  Pain score: 0    Airway patency: patent  Anesthetic complications: no  Cardiovascular status: hemodynamically stable  Respiratory status: acceptable  Hydration status: euvolemic    PONV: none          No notable events documented.     Nurse Pain Score: 0 (NPRS)

## 2022-08-26 NOTE — PROGRESS NOTES
"Pt c/o anxiety and wanting something to calm her down. Got an order for PRN ativan from Dr. Melendez. Went to give pt ativan and pt in room sobbing states \"I don't want to take a pill, I want something IV. I am nauseous.\" Offered to get pt zofran and she said \"it doesn't work\". Explained to pt that she needed the enema and that it could help with her nausea and stomach pain. Pt stated \"I do not want an enema now I am in too much pain\" continually sobbing loudly. Mother at bedside. Retimed enemas for a later time. Attempted to re-educate pt on the need for the enema and the benefit of taking the ativan, also explained no IV ativan available at time. Pt refusing both still at this time.   "

## 2022-08-26 NOTE — DIETARY
"Nutrition services: Day 1 of admit.  Lily Nicole is a 26 y.o. female with admitting DX of GIB (gastrointestinal bleeding)    Consult received for low BMI      Assessment:  Height: 170.2 cm (5' 7\")  Weight: 49.1 kg (108 lb 3.9 oz)  Body mass index is 16.95 kg/m²., BMI classification: underweight  Diet/Intake: clear liquid (renal)/0 x 2     Evaluation:   HX includes ESRD w/ dialysis Mon, Wed, Fri, and lupus. EGD completed today.   Based on wt hx in Epic, pt has lost 3.8 kg (7%) x 7 months. Wt loss is not significant but worth noting due to pt's low BMI. Of note, based on pt's reported wt on her [place] ID card issued in 2017, pt weighed 150 lb. Pt has lost 42 lb (28%) sometime over the past 5 years.   During RD visit, she said that she was eating fine PTA. She drank Boost Breeze supplements PTA and agreed to addition of this supplement in the hospital for additional nutrition.   Pt with moderate muscle loss noted in temple and clavicle region. Pt also with moderate fat loss in orbital region.   Labs: 8/26/22: sodium=125 (L), potassium=5.2 (WNL but trending up)    Malnutrition Risk: pt meets criteria for moderate malnutrition in the context of chronic illness r/t dx of lupus with ESRD and dialysis AEB moderate muscle loss noted in the temple and clavicle region and moderate fat loss noted in the orbital region.    Recommendations/Plan:  Add Boost Breeze to meals TID  Advance diet beyond clears when medically feasible.    Encourage intake of >50%  Document intake of all meals and supplements as % taken in ADL's to provide interdisciplinary communication across all shifts.   Monitor weight.  Nutrition rep will continue to see patient for ongoing meal and snack preferences.         RD will follow.  "

## 2022-08-26 NOTE — DISCHARGE PLANNING
Outpatient Dialysis Note     Confirmed patient is active at:     [facility]   [address] [place] [id number]     Schedule: Monday, Wednesday, Friday  Time: 6:15 AM     Patient is seen by Dr. Trent in HD clinic.     Spoke with Viki at facility who confirmed.     Forwarded records for review.     Xitlaly Reyes   Dialysis Coordinator / [facility]  Ph#: [phone]

## 2022-08-26 NOTE — DISCHARGE PLANNING
ER CM met with pt at bedside. AOGRISELDA Alvarez. She is well known to this ER CM . She has a very complex medical hx Lupus, CKD ESRD, anemia ,Karen Keyes tears, aVMs , and  chronic pain. BMI is 17.She is established with Dialysis Center MWF 6:15 [facility] [address] [place] [address] She attends well.  RX [facility] on [address] , and Dr Matthew is her PCP.  Her parents are her support network and they live together in 1 story home in [place]. NO DME and ambulates independent. She does use O2 from preferred. She follows with [facility].  She has been on Epogen recently. She offers no new needs or concerns at home.   Care Transition Team Assessment    Information Source  Orientation Level: Oriented X4  Information Given By: Patient  Who is responsible for making decisions for patient? : Patient              Interdisciplinary Discharge Planning  Primary Care Physician: Dr Matthew  Lives with - Patient's Self Care Capacity: Parents  Patient or legal guardian wants to designate a caregiver: No  Support Systems: None  Housing / Facility: 1 Story House  Do You Take your Prescribed Medications Regularly: Yes  Able to Return to Previous ADL's: Future Time w/Therapy  Mobility Issues: Yes  Prior Services: Other (Comments)  Patient Prefers to be Discharged to:: Dialysis  Assistance Needed: Yes  Durable Medical Equipment: Home Oxygen    Discharge Preparedness  What is your plan after discharge?: Uncertain - pending medical team collaboration, Home with help  What are your discharge supports?: Parent  Prior Functional Level: Ambulatory, Needs Assist with Activities of Daily Living    Functional Assesment  Prior Functional Level: Ambulatory, Needs Assist with Activities of Daily Living    Finances  Prescription Coverage: Yes ([employer] [facility])                   Domestic Abuse  Have you ever been the victim of abuse or violence?: No  Physical Abuse or Sexual Abuse: No  Verbal Abuse or Emotional Abuse:  No  Possible Abuse/Neglect Reported to:: Not Applicable         Discharge Risks or Barriers  Discharge risks or barriers?: Complex medical needs, Transportation    Anticipated Discharge Information  Discharge Disposition: Discharged to home/self care (01)

## 2022-08-26 NOTE — OR NURSING
0847: Rcvd report from MICHELLE Vu. Transport scheduled to bring pt to pre-op via gurney at 0915.  0923: Pt brought to pre-op holding via gurney by transport. Pain is tolerable, no nausea, awake and alert, on 4 LPM supplemental O2.  0945: Patient allergies and NPO status verified, home medication reconciliation completed and belongings secured. Patient verbalizes understanding of pain scale, expected course of stay and plan of care. Surgical site verified with patient. PIV patency verified.

## 2022-08-26 NOTE — OP REPORT
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   Duodenum: duodenitis in the duodenal bulb    Procedure(s):  ESOPHAGOGASTRODUODENOSCOPY - Wound Class: None  GASTROSCOPY, WITH ARGON PLASMA COAGULATION - Wound Class: None    Surgeon(s):  Parveen Wood M.D.    Anesthesiologist/Type of Anesthesia:  Anesthesiologist: Tai Araujo D.O./General    Surgical Staff:  Circulator: Renan Mcbride R.N.  Endoscopy Technician: Tammy Carlson; Ana Shearer; Ashley Chaves; Ebony Almonte    Specimens removed if any:  * No specimens in log *    Estimated Blood Loss: Minimal    Anesthesia/Medications:  see anesthesia note     COMPLICATIONS:  No immediate complications.    PROCEDURE IN DETAIL, Findings and ENDOSCOPIC DIAGNOSIS:      -Prior to the procedure, a History and Physical was performed, and patient medications and allergies were reviewed. The patient’s tolerance of previous anesthesia was also reviewed. The risks and benefits of the procedure and the sedation options and risks were discussed with the patient. All questions were answered, and informed consent was obtained. The patient was deemed in satisfactory condition to undergo the procedure.    -Prior Anticoagulants: the patient has taken no previous anticoagulant or antiplatelet agents.    -ASA Grade Assessment: see anesthesia note     -The patient was placed in the left lateral decubitus position. The scope was passed under direct vision. Continuous oxygen was provided via nasal cannula and intravenous sedation was administered in divided doses throughout the procedure. The patient’s blood pressure, pulse, and oxygen saturation were monitored continuously throughout the procedure.    -The gastroscope was gently advanced under  direct visualization over the tongue, down the esophagus, through the stomach and into the 2nd portion of the duodenum. The color, texture, mucosa and anatomy of esophagus, stomach and duodenum were carefully examined with the scope. The scope was then withdrawn from the patient and the procedure terminated. Further details are in the finding section, based on anatomical location.    -The patient tolerated the procedure well and there were no immediate complications. The patient was then transferred to the recovery room in stable condition.    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   Duodenum: duodenitis in the duodenal bulb    RECOMMENDATIONS:    1. PPI BIDAC for 4-6 months  2. Keep the stool soft with Miralax daily or BID  3. F/u with [facility] established providers  4. OK to discharge home once criteria met        8/26/2022 10:22 AM Parveen Wood M.D.

## 2022-08-26 NOTE — PROGRESS NOTES
Received bedside report from MICHELLE Bullard, pt care assumed. VSS, pt assessment complete. Pt A&Ox4, no c/o  pain at this time. POC discussed with pt and verbalizes no questions. Pt denies any additional needs at this time. Bed locked and in lowest position, bed alarm active. Pt educated on fall risk and verbalized understanding, call light within reach, hourly rounding initiated.

## 2022-08-26 NOTE — PROGRESS NOTES
Telemetry Shift Summary     Rhythm SR  HR Range 78-95  Ectopy N/A  Measurements  0.18/ 0.08/ 0.56  Per strip printed 0400     Normal Values  Rhythm SR  HR Range    Measurements 0.12-0.20 / 0.06-0.10  / 0.30-0.52

## 2022-08-26 NOTE — CARE PLAN
The patient is Stable - Low risk of patient condition declining or worsening    Shift Goals  Clinical Goals: Monitor HGB, fleet enema, moviprep    Progress made toward(s) clinical / shift goals:      Problem: Knowledge Deficit - Standard  Goal: Patient and family/care givers will demonstrate understanding of plan of care, disease process/condition, diagnostic tests and medications  Outcome: Progressing  Note: Pt updated on POC, blood draws, blood transfusions, EGD prep, and NPO status.     Problem: Pain - Standard  Goal: Alleviation of pain or a reduction in pain to the patient’s comfort goal  Outcome: Progressing  Note: Pt educated on constipation r/t opioid analgesics and importance of bowel prep.      Problem: Risk for Fluid Volume Deficit Related to Bleeding  Goal: Fluid volume balance will be maintained  Outcome: Progressing  Note: Orders to transfuse for HGB below 7.      Problem: Risk for Bleeding  Goal: Patient will take measures to prevent bleeding and recognizes signs of bleeding that need to be reported immediately to a health care professional  Outcome: Progressing       Patient is not progressing towards the following goals:

## 2022-08-26 NOTE — PROGRESS NOTES
"Hospital Medicine Daily Progress Note    Date of Service  8/26/2022    Chief Complaint  Lily Nicole is a 26 y.o. female admitted 8/25/2022 with abdominal pain, gi bleeding.    Hospital Course  As per chart review:  \"26-year-old woman with a history of end-stage renal disease on Monday Wednesday Friday dialysis secondary to lupus.  She has a history of chronic anemia and receives Epogen with dialysis under the care of her nephrologist Dr. Trent.  She has somewhat acute on chronic nausea and vomiting issues, progressing to hematemesis last night and this morning for which she presented to the emergency department for further evaluation.  Hemoglobin was observed to be 6.2 on labs.  She has received multiple transfusions in the past and is somewhat difficult to type and screen due to antibody issues.  She is requesting Benadryl and Tylenol pretransfusion.  Case was discussed with GI, given her history of Karen-Keyes tears and gastritis in the past she will be provisioned with a PPI drip and EGD will be performed.\"    Interval Problem Update  8/26: Patient seen at bedside this morning.  Patient seen with nursing at the bedside with me.  Patient to undergo EGD by GI today.  We appreciate further recommendations.  It seems the patient required blood transfusion.  The patient states that she has not had another episode of bloody vomiting.  Nephrology also following for end-stage renal disease for dialysis.  We appreciate further recommendations.  We will continue to monitor closely.    I have discussed this patient's plan of care and discharge plan at IDT rounds today with Case Management, Nursing, Nursing leadership, and other members of the IDT team.    Consultants/Specialty  GI and nephrology    Code Status  Full Code    Disposition  Patient is not medically cleared for discharge.   Anticipate discharge to to home with close outpatient follow-up.  I have placed the appropriate orders for post-discharge " needs.    Review of Systems  Review of Systems   Constitutional:  Positive for malaise/fatigue. Negative for chills and fever.   HENT:  Negative for hearing loss and nosebleeds.    Eyes:  Negative for blurred vision and double vision.   Respiratory:  Negative for cough and shortness of breath.    Cardiovascular:  Negative for chest pain and palpitations.   Gastrointestinal:  Negative for abdominal pain, heartburn and vomiting.   Genitourinary:  Negative for dysuria and urgency.   Musculoskeletal:  Negative for back pain and falls.   Skin:  Negative for itching and rash.   Neurological:  Positive for weakness (generalized). Negative for dizziness and headaches.   Psychiatric/Behavioral:  Negative for substance abuse. The patient is not nervous/anxious.    All other systems reviewed and are negative.     Physical Exam  Temp:  [36.3 °C (97.3 °F)-36.9 °C (98.5 °F)] 36.9 °C (98.4 °F)  Pulse:  [] 85  Resp:  [14-22] 18  BP: ()/() 164/111  SpO2:  [92 %-100 %] 99 %    Physical Exam  Vitals and nursing note reviewed.   Constitutional:       General: She is not in acute distress.     Appearance: She is ill-appearing (chronically ill).      Comments: Very thin patient   HENT:      Head: Normocephalic and atraumatic.      Right Ear: External ear normal.      Left Ear: External ear normal.      Mouth/Throat:      Pharynx: No oropharyngeal exudate or posterior oropharyngeal erythema.   Eyes:      General:         Right eye: No discharge.         Left eye: No discharge.   Cardiovascular:      Rate and Rhythm: Normal rate and regular rhythm.      Pulses: Normal pulses.      Heart sounds: No murmur heard.    No gallop.   Pulmonary:      Effort: Pulmonary effort is normal. No respiratory distress.      Breath sounds: Normal breath sounds. No wheezing or rhonchi.   Abdominal:      General: Bowel sounds are normal. There is no distension.      Palpations: Abdomen is soft.      Tenderness: There is no abdominal  tenderness. There is no guarding.   Musculoskeletal:         General: No swelling or tenderness. Normal range of motion.      Cervical back: Normal range of motion and neck supple. No tenderness.   Skin:     General: Skin is warm and dry.   Neurological:      General: No focal deficit present.      Mental Status: She is alert and oriented to person, place, and time. Mental status is at baseline.      Motor: No weakness.   Psychiatric:         Mood and Affect: Mood normal.         Behavior: Behavior normal.         Thought Content: Thought content normal.       Fluids    Intake/Output Summary (Last 24 hours) at 8/26/2022 1306  Last data filed at 8/26/2022 1237  Gross per 24 hour   Intake 1308 ml   Output 3050 ml   Net -1742 ml       Laboratory  Recent Labs     08/25/22  1519 08/25/22 1859 08/26/22  0645   WBC 2.1* 3.0* 5.4   RBC 2.74* 2.37* 3.04*   HEMOGLOBIN 7.4* 6.4* 8.5*   HEMATOCRIT 25.3* 21.2* 27.4*   MCV 92.3 89.5 90.1   MCH 27.0 27.0 28.0   MCHC 29.2* 30.2* 31.0*   RDW 63.8* 62.1* 56.9*   PLATELETCT 194 213 239   MPV 9.0 9.3 9.9     Recent Labs     08/25/22  0744 08/25/22  1519 08/25/22 1859 08/26/22  0645   SODIUM 129* 126*  --  125*   POTASSIUM 4.9 4.5 3.9 5.2   CHLORIDE 88* 85*  --  87*   CO2 31 29  --  27   GLUCOSE 82 80  --  74   BUN 37* 40*  --  50*   CREATININE 3.41* 3.68*  --  4.35*   CALCIUM 8.6 8.6  --  8.3*                   Imaging  [id number] VIEW   Final Result      No evidence of bowel obstruction.           Assessment/Plan  * GIB (gastrointestinal bleeding)- (present on admission)  Assessment & Plan  Acute on chronic issue.  GI will scope, further mangement pending findings. PPI ggt. patient is requesting Tylenol and Benadryl prior to transfusion.      8/26: Patient will undergo EGD by GI today.  We appreciate further recommendations.    Hyponatremia- (present on admission)  Assessment & Plan  In the setting of end-stage renal disease on dialysis.  Monitor.    Chronic respiratory failure  with hypoxia (HCC)- (present on admission)  Assessment & Plan  Continue oxygen supplementation as needed.    Anemia associated with acute blood loss- (present on admission)  Assessment & Plan  Likely mixed chronic disease anemia given dialysis.    8/26: The patient will undergo EGD by GI today, we appreciate further recommendations. Monitor Hb.    Lupus (HCC)- (present on admission)  Assessment & Plan  Resume home medications.    HTN (hypertension)- (present on admission)  Assessment & Plan  Resume medications with holding parameters.    ESRD (end stage renal disease) ([facility])  Assessment & Plan  Nephrologist Dr. Trent has been notified that patient has been admitted, anticipate continuing her usual MWF schedule.         VTE prophylaxis: SCDs/TEDs    I have performed a physical exam and reviewed and updated ROS and Plan today (8/26/2022). In review of yesterday's note (8/25/2022), there are no changes except as documented above.

## 2022-08-26 NOTE — PROGRESS NOTES
"Dialysis RN at bedside called for a rapid because pt began \"feeling like she is going to pass out\". Pt hypertensive. Labetalol given per MAR. And PRN ativan. BP better after and pt states \"I feel better\".  "

## 2022-08-26 NOTE — PROGRESS NOTES
Tele strip at 1458 shows sinus rhythm.      Measurements from am strip were as follows:  NJ=0.16  QRS=0.08  QT=0.30    Tele Shift Summary:    Rhythm : sinus rhythm  Rate : 84  Ectopy : Per CCT [place], pt had rare PVC.     Telemetry monitoring strips placed in pt's chart.

## 2022-08-26 NOTE — PROGRESS NOTES
Pt refused fleet enema. RN told pt that when blood transfusion was complete, there are two fleet enemas ordered as part of prep for EGD. Pt stated that she still felt nauseas and did not want to do enema. Pt was educated. RN will try again after transfusion is complete.

## 2022-08-26 NOTE — CARE PLAN
Problem: Knowledge Deficit - Standard  Goal: Patient and family/care givers will demonstrate understanding of plan of care, disease process/condition, diagnostic tests and medications  Outcome: Progressing     Problem: Pain - Standard  Goal: Alleviation of pain or a reduction in pain to the patient’s comfort goal  Outcome: Progressing   The patient is Stable - Low risk of patient condition declining or worsening    Shift Goals  Clinical Goals: monitor bleeding  Patient Goals: drink water/ soda    Progress made toward(s) clinical / shift goals:  achieved    Patient is not progressing towards the following goals:

## 2022-08-27 NOTE — PROCEDURES
Nephrology/Hemodialysis note    Patient with ESRD/HD admitted with GIB  S/p EGD/APC, blood transfusion  Seen and examined during dialysis  Tolerates well  VS stable  UF 1-2 L  Please see dialysis flow sheet for details

## 2022-08-27 NOTE — PROGRESS NOTES
Pt c/o 10/10 abdominal pain. No PRN analgesics available at this time. Repositioning, ice pack and distraction utilized. MD Dr. Styles notified, no new orders.

## 2022-08-27 NOTE — CARE PLAN
The patient is Stable - Low risk of patient condition declining or worsening    Shift Goals  Clinical Goals: control pain  Patient Goals: manage pain, rest    Progress made toward(s) clinical / shift goals:    Problem: Knowledge Deficit - Standard  Goal: Patient and family/care givers will demonstrate understanding of plan of care, disease process/condition, diagnostic tests and medications  Outcome: Progressing     Problem: Fall Risk  Goal: Patient will remain free from falls  Outcome: Progressing  Note: Patient has remained fall free. Utilizes call light appropriately        Patient is not progressing towards the following goals:      Problem: Pain - Standard  Goal: Alleviation of pain or a reduction in pain to the patient’s comfort goal  Outcome: Not Progressing  Flowsheets (Taken 8/27/2022 0000 by Vinod Nicholas, R.N.)  Pain Rating Scale (NPRS): 10  Note: Pain has progressed throughout the shift. Doctor notified. No new orders at this time.

## 2022-08-27 NOTE — CARE PLAN
Problem: Knowledge Deficit - Standard  Goal: Patient and family/care givers will demonstrate understanding of plan of care, disease process/condition, diagnostic tests and medications  Outcome: Progressing     Problem: Pain - Standard  Goal: Alleviation of pain or a reduction in pain to the patient’s comfort goal  Outcome: Not Progressing  Flowsheets (Taken 8/27/2022 0454 by Vinod Nicholas, R.N.)  Pain Rating Scale (NPRS): 10   The patient is Stable - Low risk of patient condition declining or worsening    Shift Goals  Clinical Goals: control pain  Patient Goals: manage pain, rest    Progress made toward(s) clinical / shift goals:  achieved     Patient is not progressing towards the following goals:      Problem: Pain - Standard  Goal: Alleviation of pain or a reduction in pain to the patient’s comfort goal  Outcome: Not Progressing  Flowsheets (Taken 8/27/2022 0454 by Vinod Nicholas, R.N.)  Pain Rating Scale (NPRS): 10

## 2022-08-27 NOTE — PROGRESS NOTES
Patient complained of 10/10 abdominal pain. Repositioning, ice packs, and heating pads offered due to analgesic scheduling. Doctor was notified and no new orders at this time.

## 2022-08-27 NOTE — PROGRESS NOTES
Received bedside report from MICHELLE Vu, pt care assumed. VS stable, pt assessment complete. Pt A&Ox4, pt c/o 9/10 pain at this time and received analgesic per MAR. POC discussed with pt and verbalizes no questions. Pt request IV to be flushed due to discomfort. Bed locked and in lowest position. Pt educated on fall risk and verbalized understanding, call light within reach, hourly rounding initiated.

## 2022-08-27 NOTE — PROGRESS NOTES
Telemetry Shift Summary     Rhythm SR  HR Range 81-89  Ectopy r PVC  Measurements  0.20/0.08/0.42  Per strip printed 0400     Normal Values  Rhythm SR  HR Range    Measurements 0.12-0.20 / 0.06-0.10  / 0.30-0.52

## 2022-08-27 NOTE — PROGRESS NOTES
"Pt c/o 10/10 abdominal pain, crying out in pain, screaming that \"my stomach is killing me.\" All non pharmacological methods of pain management has been ineffective. Cold, heat, distraction and repositioning is not working. HR elevated to 157 but did not sustain. MD Dr. Styles notified by MICHELLE Lovell and no new orders entered. MD stated that no orders will be given at night that the rounding Dr did not give.   "

## 2022-08-27 NOTE — PROGRESS NOTES
Gastroenterology Progress Note:    Parveen Wood M.D.  Date & Time note created:    8/27/2022   12:44 PM     Patient ID:  Name:             Lily Abdi  YOB: 1995  Age:                 26 y.o.  female  MRN:               [id number]    Referring MD:  Dr. Melendez                                                             Chief Complaint(s):      Hematemesis    History of Present Illness:    This is a very pleasant 26 y.o. female with the past medical history of Lupus (seeing Abdoulaye Wong) and end stage renal disease on dialysis (Renal transplant on hold due to lupus control), who presents to the Emergency Department for moderate epigastric abdominal pain since 8/24/22. Patient reports associated nausea and lightheadedness that began two days ago, with multiple episodes of vomiting since yesterday. Her last 2-3 episodes hematemesis. She had similar symptoms in 2020 and 2021, an had an EGD which revealed esophagitis, gastritis, Karen-Keyes tear, and angiodysplasia of the stomach. She takes Pantoprazole daily. Patient last dialyzed yesterday, and sates it was a normal session for her. She denies constipation, diarrhea, melena, fevers, or chills.    She also has chronic abdominal pain, history of gastric and duodenal angioectasias and chronic GI bleeding from these.  The patient denies NSAIDs. Otherwise the patient is doing fine without complaints of fever/chills/weight loss/appetite change/dysphagia/odynophagia/heartburn/bloating/constipation/diarrhea/melena/hematochezia or abdominal pain.  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   Duodenum: duodenitis in the duodenal bulb     8/27/2022 Still some nausea with coffee ground hematemesis. H/H stable. Wants to go home Zofran works at home.     Review of Systems:     "  Constitutional: Denies fevers, weight changes  Eyes: Denies changes in vision, jaundice  Ears/Nose/Throat/Mouth: Denies nasal congestion or sore throat   Cardiovascular: Denies chest pain or palpitations   Respiratory: Denies shortness of breath, denies cough  Gastrointestinal/Hepatic: Mild abdominal pain, nausea, vomiting; denies diarrhea, constipation  Genitourinary: Denies dysuria or frequency  Musculoskeletal/Rheum: Denies  joint pain and swelling, edema  Skin: Denies rash  Neurological: Denies headache, confusion, memory loss or focal weakness/parasthesias  Psychiatric: denies mood disorder   Endocrine: Elizabeth thyroid problems  Heme/Oncology/Lymph Nodes: Denies enlarged lymph nodes, denies brusing or known bleeding disorder  All other systems were reviewed and are negative (AMA/CMS criteria)            ROS    Past Medical History:   Past Medical History:   Diagnosis Date    Anemia 01/17/2018    AVF (arteriovenous fistula) ([facility])     Right Arm    Chest pain     Chest tightness     Daytime sleepiness     Dialysis patient ([facility])      dialysis, M,W,F [facility]    Difficulty breathing     ESRD (end stage renal disease) on dialysis ([facility]) 01/17/2018    Twan Fu    Gasping for breath     Heart burn     Hypertension 01/17/2018    \"Controlled with medication\"    Indigestion     Lupus ([facility])     Migraines 01/17/2018    Painful breathing     Palpitations     Seizure ([facility]) 2013    from high blood pressure, reports 1 time event    Shortness of breath     Swelling of lower extremity     Wheezing      Active Hospital Problems    Diagnosis     Hyponatremia [E87.1]     Chronic respiratory failure with hypoxia ([facility]) [J96.11]     Anemia associated with acute blood loss [D62]     GIB (gastrointestinal bleeding) [K92.2]     Lupus ([facility]) [M32.9]     HTN (hypertension) [I10]     ESRD (end stage renal disease) ([facility]) [N18.6]        Past Surgical History:  Past Surgical History:   Procedure Laterality Date    ME BRONCHOSCOPY,DIAGNOSTIC " Bilateral 5/13/2021    Procedure: BRONCHOSCOPY, BRONCHOALVEOLAR LAVAGE;  Surgeon: William Spangler M.D.;  Location: [facility];  Service: Pulmonary    [place] UPPER GI ENDOSCOPY,DIAGNOSIS N/A 3/19/2021    Procedure: GASTROSCOPY;  Surgeon: Waylon Mcqueen M.D.;  Location: [facility];  Service: Gastroenterology    [place] UPPER GI ENDOSCOPY,CTRL BLEED  3/19/2021    Procedure: GASTROSCOPY, WITH ARGON PLASMA COAGULATION;  Surgeon: Waylon Mcqueen M.D.;  Location: [facility];  Service: Gastroenterology    [place] UPPER GI ENDOSCOPY,DIAGNOSIS  3/5/2021    Procedure: GASTROSCOPY - W/HEMOSTASIS;  Surgeon: Ej Silva M.D.;  Location: [facility];  Service: Gastroenterology    [place] COLONOSCOPY,DIAGNOSTIC  1/8/2021    Procedure: COLONOSCOPY;  Surgeon: Herbert Contreras M.D.;  Location: [facility];  Service: Gastroenterology    [place] UPPER GI ENDOSCOPY,DIAGNOSIS  1/8/2021    Procedure: GASTROSCOPY;  Surgeon: Herbert Contreras M.D.;  Location: [facility];  Service: Gastroenterology    [place] UPPER GI ENDOSCOPY,CTRL BLEED  1/8/2021    Procedure: GASTROSCOPY, WITH ARGON PLASMA COAGULATION;  Surgeon: Herbert Contreras M.D.;  Location: [facility];  Service: Gastroenterology    [place] UPPER GI ENDOSCOPY,CTRL BLEED  11/12/2020    Procedure: GASTROSCOPY, WITH ARGON PLASMA COAGULATION;  Surgeon: Gadiel Whitney M.D.;  Location: [facility];  Service: Gastroenterology    [place] UPPER GI ENDOSCOPY,BIOPSY  11/12/2020    Procedure: GASTROSCOPY, WITH BIOPSY;  Surgeon: Gadiel Whitney M.D.;  Location: [facility];  Service: Gastroenterology    GASTROSCOPY-ENDO  11/12/2020    Procedure: GASTROSCOPY;  Surgeon: Gadiel Whitney M.D.;  Location: [facility];  Service: Gastroenterology    GASTROSCOPY-ENDO  9/18/2020    Procedure: GASTROSCOPY;  Surgeon: Gadiel Whitney M.D.;  Location: [facility];  Service: Gastroenterology    GASTROSCOPY N/A 5/30/2020    Procedure:  "GASTROSCOPY;  Surgeon: Waylon Mcqueen M.D.;  Location: SURGERY [facility];  Service: Gastroenterology    GASTROSCOPY-ENDO  12/9/2019    Procedure: GASTROSCOPY;  Surgeon: Aaron Kam M.D.;  Location: SURGERY [facility];  Service: Gastroenterology    ANGIOPLASTY  01/17/2018    \"Right Arm AV-Fistulagram & Angioplastyx3\"    ULI BY LAPAROSCOPY  4/5/2010    Performed by SYED MARTELL at SURGERY [facility]    AV FISTULA CREATION Right     OTHER      renal biopsy x 3    OTHER      bone marrow biopsy       Hospital Medications:  Current Facility-Administered Medications   Medication Dose Frequency Provider Last Rate Last Admin    promethazine (PHENERGAN) suppository 12.5 mg  12.5 mg Q6HRS PRN Rivera Alfaro M.D.        ondansetron (ZOFRAN) syringe/vial injection 4 mg  4 mg Post-Op once Tai Araujo DJOHN   4 mg at 08/26/22 1042    HYDROmorphone (Dilaudid) injection 0.5 mg  0.5 mg Q8HRS PRN Rivera Alfaro M.D.   0.5 mg at 08/27/22 0454    amLODIPine (NORVASC) tablet 5 mg  5 mg QHS Rivera Alfaro M.D.   5 mg at 08/26/22 2036    atenolol (TENORMIN) tablet 25 mg  25 mg QAM Rivera Alfaro M.D.        cloNIDine (CATAPRES) tablet 0.3 mg  0.3 mg BID Rivera Alfaro M.D.        hydroxychloroquine (Plaquenil) tablet 200 mg  200 mg QHS Rivera Alfaro M.D.   200 mg at 08/26/22 2036    predniSONE (DELTASONE) tablet 5 mg  5 mg QAM Rivera Alfaro M.D.   5 mg at 08/26/22 1407    mycophenolate (CELLCEPT) capsule 500 mg  500 mg QHS Rivera Alfaro M.D.   500 mg at 08/26/22 2036    lidocaine (XYLOCAINE) 1 % injection 1 mL  1 mL PRN Melinda Trent M.D.        omeprazole (PRILOSEC) capsule 20 mg  20 mg BID Rivera Alfaro M.D.   20 mg at 08/26/22 1756    diphenhydrAMINE (BENADRYL) injection 25 mg  25 mg Q6HRS PRN Aranza Ríos M.D.   25 mg at 08/26/22 1845    cloNIDine (CATAPRES) 0.2 MG/24HR patch 1 " Patch  1 Patch Once per day on Fri Rivera Alfaro M.D.   1 Patch at 08/26/22 2055    ondansetron (ZOFRAN) syringe/vial injection 4 mg  4 mg Q4HRS PRN Jorge Melendez M.D.   4 mg at 08/27/22 0954    polyethylene glycol/lytes (MIRALAX) PACKET 1 Packet  1 Packet DAILY Parveen Wood M.D.   1 Packet at 08/25/22 1309    promethazine (PHENERGAN) tablet 25 mg  25 mg Q6HRS PRN Jorge Melendez M.D.   25 mg at 08/25/22 1542    labetalol (NORMODYNE/TRANDATE) injection 10 mg  10 mg Q6HRS PRN Jorge Melendez M.D.   10 mg at 08/26/22 1042    LORazepam (ATIVAN) tablet 0.5 mg  0.5 mg Q4HRS PRN Jorge Melendez M.D.   0.5 mg at 08/26/22 1407    dextrose 10 % BOLUS 25 g  25 g Q15 MIN PRN Jorge Melendez M.D.   25 g at 08/25/22 1816   Last reviewed on 8/26/2022  9:40 AM by Jennifer Jackson R.N.     Current Outpatient Medications:  Medications Prior to Admission   Medication Sig Dispense Refill Last Dose    cloNIDine (CATAPRES) 0.3 MG Tab Take 0.3 mg by mouth 2 times a day.   8/24/2022 at PM    HYDROmorphone (DILAUDID) 2 MG Tab Take 2 mg by mouth 1 time a day as needed for Severe Pain.   2 WEEKS at PRN    ondansetron (ZOFRAN ODT) 4 MG TABLET DISPERSIBLE Take 4 mg by mouth every 6 hours as needed for Nausea.   8/24/2022 at PM    amLODIPine (NORVASC) 5 MG Tab Take 5 mg by mouth at bedtime.   8/24/2022 at PM    mycophenolate sodium (MYFORTIC) 360 MG Tablet Delayed Response tablet Take 360 mg by mouth at bedtime.   8/24/2022 at HS    atenolol (TENORMIN) 25 MG Tab Take 25 mg by mouth every morning.   8/24/2022 at AM    pantoprazole (PROTONIX) 40 MG Tablet Delayed Response Take 1 tablet by mouth 2 times a day. 30 tablet 3 8/24/2022 at PM    cloNIDine (CATAPRES) 0.2 MG/24HR PATCH WEEKLY patch Place 1 Patch on the skin every thursday.   8/18/2022 at UNK    predniSONE (DELTASONE) 5 MG Tab Take 5 mg by mouth every morning.   8/24/2022 at AM    hydroxychloroquine (PLAQUENIL) 200 MG Tab Take 200 mg by mouth at bedtime.   8/24/2022 at HS  "        Medication Allergy:  Allergies   Allergen Reactions    Cephalexin Rash     Nausea and rash   Hives  .    Clindamycin Rash     Nausea and rash     Hive  .    Hydrocodone Rash and Nausea     Rash      Maxipime [Cefepime] Itching    Methylprednisolone Unspecified     Anxious      Tolerates prednisone     Metoprolol Rash and Nausea     Nausea and rash     Tolerates antenalol    Compazine Anxiety    Fentanyl And Related Anxiety    Metoclopramide Anxiety    Tape Rash     Paper tape is ok       Family History:  Family History   Problem Relation Age of Onset    Diabetes Paternal Grandmother        Social History:  Social History     Socioeconomic History    Marital status: Single     Spouse name: Not on file    Number of children: Not on file    Years of education: Not on file    Highest education level: Not on file   Occupational History    Not on file   Tobacco Use    Smoking status: Never    Smokeless tobacco: Never   Vaping Use    Vaping Use: Never used   Substance and Sexual Activity    Alcohol use: No    Drug use: No    Sexual activity: Not on file   Other Topics Concern    Not on file   Social History Narrative    Not on file     Social Determinants of Health     Financial Resource Strain: Not on file   Food Insecurity: Not on file   Transportation Needs: Not on file   Physical Activity: Not on file   Stress: Not on file   Social Connections: Not on file   Intimate Partner Violence: Not on file   Housing Stability: Not on file       Physical Exam:  Weight/BMI: Body mass index is 16.95 kg/m².  BP (!) 176/114   Pulse (!) 117   Temp 36.8 °C (98.3 °F) (Oral)   Resp 18   Ht 1.702 m (5' 7\")   Wt 49.1 kg (108 lb 3.9 oz)   SpO2 92%   Vitals:    08/27/22 0500 08/27/22 0702 08/27/22 0844 08/27/22 1129   BP: (!) 155/118 (!) 166/124  (!) 176/114   Pulse: (!) 113 (!) 118  (!) 117   Resp: 20 16  18   Temp: 36.4 °C (97.6 °F) 36.4 °C (97.6 °F)  36.8 °C (98.3 °F)   TempSrc: Oral Temporal  Oral   SpO2:  93% 93% 92% "   Weight:       Height:         Oxygen Therapy:  Pulse Oximetry: 92 %, O2 (LPM): 4, O2 Delivery Device: Silicone Nasal Cannula    Intake/Output Summary (Last 24 hours) at 8/27/2022 1244  Last data filed at 8/27/2022 0702  Gross per 24 hour   Intake 618 ml   Output 2650 ml   Net -2032 ml     Physical Exam     Constitutional:   Well developed, well nourished, no acute distress. Chronic ill looking, thin  HEENT:  Normocephalic, Atraumatic, Conjunctiva not pale, Sclera not icteric, Oropharynx moist mucous membranes, No oral exudates, Nose normal.  No thyromegaly.  Neck:  Normal range of motion, No cervical tenderness,  no JVD.  Chest/Lungs:  Symmetric expansion, no spider angioma, breath sounds clear to auscultation bilaterally,  no crackles, no wheezing.   Cardiovascular:  Normal heart rate, Normal rhythm, No murmurs, No rubs, No gallops.    Abdomen: Bowel sounds normal, Soft, No tenderness, No guarding, No rebound, No masses, No hepatosplenomegaly.  Extremities: No cyanosis/clubbing/edema/palmar erythema/flapping tremor  Skin: Warm, Dry, No erythema, No rash, no induration.    MDM (Data Review):     Records reviewed and summarized in current documentation    Lab Data Review:  Recent Results (from the past 24 hour(s))   HEMOGLOBIN AND HEMATOCRIT    Collection Time: 08/26/22  2:40 PM   Result Value Ref Range    Hemoglobin 8.1 (L) 12.0 - 16.0 g/dL    Hematocrit 25.4 (L) 37.0 - 47.0 %   HEMOGLOBIN AND HEMATOCRIT    Collection Time: 08/27/22 12:54 AM   Result Value Ref Range    Hemoglobin 9.2 (L) 12.0 - 16.0 g/dL    Hematocrit 31.1 (L) 37.0 - 47.0 %   Basic Metabolic Panel    Collection Time: 08/27/22 12:54 AM   Result Value Ref Range    Sodium 130 (L) 135 - 145 mmol/L    Potassium 4.6 3.6 - 5.5 mmol/L    Chloride 93 (L) 96 - 112 mmol/L    Co2 21 20 - 33 mmol/L    Glucose 64 (L) 65 - 99 mg/dL    Bun 24 (H) 8 - 22 mg/dL    Creatinine 2.93 (H) 0.50 - 1.40 mg/dL    Calcium 8.7 8.4 - 10.2 mg/dL    Anion Gap 16.0 7.0 - 16.0    ESTIMATED GFR    Collection Time: 08/27/22 12:54 AM   Result Value Ref Range    GFR (CKD-EPI) 22 (A) >60 mL/min/1.73 m 2   HEMOGLOBIN AND HEMATOCRIT    Collection Time: 08/27/22 10:41 AM   Result Value Ref Range    Hemoglobin 9.1 (L) 12.0 - 16.0 g/dL    Hematocrit 30.2 (L) 37.0 - 47.0 %       MDM (Assessment and Plan):     Active Hospital Problems    Diagnosis     Hyponatremia [E87.1]     Chronic respiratory failure with hypoxia ([facility]) [J96.11]     Anemia associated with acute blood loss [D62]     GIB (gastrointestinal bleeding) [K92.2]     Lupus ([facility]) [M32.9]     HTN (hypertension) [I10]     ESRD (end stage renal disease) ([facility]) [N18.6]        Imaging/Procedures Review:    [id number] VIEW   Final Result      No evidence of bowel obstruction.          Assessment  - Hematemesis and coffee ground emesis likely from prepyloric GAVE s/p APC  - Nausea, multifactorial?  - H/o M-W tear  - Esophagitis  - GI AVM  - ESRD on HD  - Lupus  - Chronic constipation    Plan  - Urine drug screen pending  - PPI BIDAC  - Carafate liq QID X 1 wk. Pt has it at home  - Miralax daily, titrate according to BM  - Zofran 4 mg SL prn for nausea  - Try Malia, Turmeric or chamomile tea  - Try EtOH patch or Chi/pressure point  - OK to discharge home from GI standpoint  - F/u with Dr. Kam at [facility]     GI TO SIGN OFF / STAND BY - Thank you very much for allowing me to participate in the care of your patient.  GI to sign off at this time.  If the patient develops changes in clinical course/decompensation or you have any additional questions or concerns, please don't hesitate to reengage [facility].  If necessary, follow up with [facility] will be arranged  The patient will be called to schedule an appointment time.  If the patient does not receive a call from GI consultants scheduling or they have additional questions, recommend the patient call the clinic at [phone] or [phone] to schedule an appointment.     Thank you very  much for allowing me to participate in the care of your patient.  Please feel free to contact me anytime at [phone].     Parveen Wood M.D.    Core Quality Measures   Reviewed items::  Labs, Medications and Radiology reports reviewed  Total time 36 mins

## 2022-08-27 NOTE — PROGRESS NOTES
[facility] Services Progress Note     Hemodialysis treatment x 3 hours completed as ordered per Dr. Trent.  Treatment started at 1415 and ended at 1715       Net UF Removed:  2,000 mL      Patient tolerated treatment well. UF goal reached.  Patient stable during and post treatment, no complaints.      Post tx access: Cannulation needles removed from CARLENE AVF access sites, hemostasis established on each insertion site; verified no bleeding. (+) bruit/thrill post treatment. Covered with clean gauze dressings and secured with tape.     Report given to primary care nurse Mirella Goode RN.

## 2022-08-27 NOTE — PROGRESS NOTES
"Patient complained of 10/10 abdominal pain. Patient cried out in pain and stated, \"It feels like I'm going to pass out.\" The patient's HR increased to 157 bpm during movement and vomited. Utilization of heating pads, ice packs, and repositioning were ineffective. Dr. Styles informed at 0325 and no new orders were given.   "

## 2022-08-27 NOTE — PROGRESS NOTES
"Hospital Medicine Daily Progress Note    Date of Service  8/27/2022    Chief Complaint  Lily Nicole is a 26 y.o. female admitted 8/25/2022 with abdominal pain, gi bleeding.    Hospital Course  As per chart review:  \"26-year-old woman with a history of end-stage renal disease on Monday Wednesday Friday dialysis secondary to lupus.  She has a history of chronic anemia and receives Epogen with dialysis under the care of her nephrologist Dr. Trent.  She has somewhat acute on chronic nausea and vomiting issues, progressing to hematemesis last night and this morning for which she presented to the emergency department for further evaluation.  Hemoglobin was observed to be 6.2 on labs.  She has received multiple transfusions in the past and is somewhat difficult to type and screen due to antibody issues.  She is requesting Benadryl and Tylenol pretransfusion.  Case was discussed with GI, given her history of Karen-Keyes tears and gastritis in the past she will be provisioned with a PPI drip and EGD will be performed.\"    Interval Problem Update  8/26: Patient seen at bedside this morning.  Patient seen with nursing at the bedside with me.  Patient to undergo EGD by GI today.  We appreciate further recommendations.  It seems the patient required blood transfusion.  The patient states that she has not had another episode of bloody vomiting.  Nephrology also following for end-stage renal disease for dialysis.  We appreciate further recommendations.  We will continue to monitor closely.    8/27: Patient seen at bedside this morning.  Throughout the morning I received a call by nursing that the patient was vomiting again with nausea.  The vomiting had blood.  We will trend hemoglobin.  Initial hemoglobin seems to be stable.  We will continue to trend it and transfuse if needed.  We updated GI, we appreciate further recommendations.  The patient underwent EGD yesterday by GI. 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    I have discussed this patient's plan of care and discharge plan at IDT rounds today with Case Management, Nursing, Nursing leadership, and other members of the IDT team.    Consultants/Specialty  GI and nephrology    Code Status  Full Code    Disposition  Patient is not medically cleared for discharge.   Anticipate discharge to to home with close outpatient follow-up.  I have placed the appropriate orders for post-discharge needs.    Review of Systems  Review of Systems   Constitutional:  Positive for malaise/fatigue. Negative for chills and fever.   HENT:  Negative for hearing loss and nosebleeds.    Eyes:  Negative for blurred vision and double vision.   Respiratory:  Negative for cough and shortness of breath.    Cardiovascular:  Negative for chest pain and palpitations.   Gastrointestinal:  Positive for nausea and vomiting. Negative for heartburn.   Genitourinary:  Negative for dysuria and urgency.   Musculoskeletal:  Negative for back pain and falls.   Skin:  Negative for itching and rash.   Neurological:  Positive for weakness (generalized). Negative for dizziness and headaches.   Psychiatric/Behavioral:  Negative for substance abuse. The patient is not nervous/anxious.    All other systems reviewed and are negative.     Physical Exam  Temp:  [36.3 °C (97.4 °F)-37.2 °C (98.9 °F)] 36.8 °C (98.3 °F)  Pulse:  [105-155] 117  Resp:  [16-20] 18  BP: (108-176)/() 176/114  SpO2:  [92 %-100 %] 92 %    Physical Exam  Vitals and nursing note reviewed.   Constitutional:       General: She is not in acute distress.     Appearance: She is ill-appearing (chronically ill).      Comments: Very thin patient   HENT:      Head: Normocephalic and atraumatic.      Right Ear: External ear normal.      Left Ear: External ear normal.      Mouth/Throat:      Pharynx: No oropharyngeal exudate or posterior oropharyngeal erythema.   Eyes:      General:         Right  eye: No discharge.         Left eye: No discharge.   Cardiovascular:      Rate and Rhythm: Normal rate and regular rhythm.      Pulses: Normal pulses.      Heart sounds: No murmur heard.    No gallop.   Pulmonary:      Effort: Pulmonary effort is normal. No respiratory distress.      Breath sounds: Normal breath sounds. No wheezing or rhonchi.   Abdominal:      General: Bowel sounds are normal. There is no distension.      Palpations: Abdomen is soft.      Tenderness: There is no abdominal tenderness. There is no guarding.   Musculoskeletal:         General: No swelling or tenderness. Normal range of motion.      Cervical back: Normal range of motion and neck supple. No tenderness.   Skin:     General: Skin is warm and dry.   Neurological:      General: No focal deficit present.      Mental Status: She is alert and oriented to person, place, and time. Mental status is at baseline.      Motor: No weakness.   Psychiatric:         Mood and Affect: Mood normal.         Behavior: Behavior normal.         Thought Content: Thought content normal.       Fluids    Intake/Output Summary (Last 24 hours) at 8/27/2022 1502  Last data filed at 8/27/2022 1400  Gross per 24 hour   Intake 500 ml   Output 2650 ml   Net -2150 ml         Laboratory  Recent Labs     08/25/22  1519 08/25/22  1859 08/26/22  0645 08/26/22  1440 08/27/22  0054 08/27/22  1041   WBC 2.1* 3.0* 5.4  --   --   --    RBC 2.74* 2.37* 3.04*  --   --   --    HEMOGLOBIN 7.4* 6.4* 8.5* 8.1* 9.2* 9.1*   HEMATOCRIT 25.3* 21.2* 27.4* 25.4* 31.1* 30.2*   MCV 92.3 89.5 90.1  --   --   --    MCH 27.0 27.0 28.0  --   --   --    MCHC 29.2* 30.2* 31.0*  --   --   --    RDW 63.8* 62.1* 56.9*  --   --   --    PLATELETCT 194 213 239  --   --   --    MPV 9.0 9.3 9.9  --   --   --        Recent Labs     08/25/22  1519 08/25/22  1859 08/26/22  0645 08/27/22  0054   SODIUM 126*  --  125* 130*   POTASSIUM 4.5 3.9 5.2 4.6   CHLORIDE 85*  --  87* 93*   CO2 29  --  27 21   GLUCOSE 80  --   74 64*   BUN 40*  --  50* 24*   CREATININE 3.68*  --  4.35* 2.93*   CALCIUM 8.6  --  8.3* 8.7                     Imaging  [id number] VIEW   Final Result      No evidence of bowel obstruction.             Assessment/Plan  * GIB (gastrointestinal bleeding)- (present on admission)  Assessment & Plan  Acute on chronic issue.  GI will scope, further mangement pending findings. PPI ggt. patient is requesting Tylenol and Benadryl prior to transfusion.      8/26: Patient will undergo EGD by GI today.  We appreciate further recommendations.    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  It seems that the patient was vomiting blood today multiple times.  We will monitor hemoglobin trend, will transfuse if needed.  We will monitor closely for now.    Hyponatremia- (present on admission)  Assessment & Plan  In the setting of end-stage renal disease on dialysis.  Monitor.    Chronic respiratory failure with hypoxia (HCC)- (present on admission)  Assessment & Plan  Continue oxygen supplementation as needed.    Anemia associated with acute blood loss- (present on admission)  Assessment & Plan  Likely mixed chronic disease anemia given dialysis.    8/26: The patient will undergo EGD by GI today, we appreciate further recommendations. Monitor Hb.    8/27: Nursing called me during the morning to let him know that the patient was vomiting blood.  Hemoglobin has remained stable, will monitor hemoglobin for now.  We did update GI, we appreciate further recommendations.    Lupus (HCC)- (present on admission)  Assessment & Plan  Resume home medications.    HTN (hypertension)- (present on admission)  Assessment & Plan  Resume medications with holding parameters.    ESRD (end stage renal disease) ([facility])  Assessment & Plan  Nephrologist Dr. Trent has been notified that patient has been admitted, anticipate continuing her usual  F schedule.         VTE prophylaxis: SCDs/TEDs    I have performed a physical exam and reviewed and updated ROS and Plan today (8/27/2022). In review of yesterday's note (8/26/2022), there are no changes except as documented above.

## 2022-08-27 NOTE — PROGRESS NOTES
Telemetry Shift Summary     Rhythm Sr/ ST  HR Range   Ectopy R PVC  Measurements  0.16/ 0.08/ 0.32  Per strip printed 0400     Normal Values  Rhythm SR  HR Range    Measurements 0.12-0.20 / 0.06-0.10  / 0.30-0.52

## 2022-08-27 NOTE — PROGRESS NOTES
Patient reports itchiness and nausea about 40 minutes after receiving Ferrlecit 250mg IV. IV infusion stopped, Dr. Ríos contacted via [place], see new orders.

## 2022-08-27 NOTE — PROGRESS NOTES
Nephrology Daily Progress Note    Date of Service  8/27/2022    Chief Complaint  26 y.o. female with ESRD/HD, SLE admitted 8/25/2022 with GIB    Interval Problem Update  8/27 -s/p EGD/APC  C/o N/V/ abdominal pain  D MWF    Review of Systems  Review of Systems   Constitutional:  Positive for malaise/fatigue and weight loss. Negative for chills and fever.   HENT:  Negative for congestion, hearing loss and sinus pain.    Eyes: Negative.    Respiratory:  Negative for cough, hemoptysis, shortness of breath and wheezing.    Cardiovascular:  Negative for chest pain, palpitations, orthopnea and leg swelling.   Gastrointestinal:  Positive for abdominal pain, nausea and vomiting. Negative for diarrhea.   Skin: Negative.    All other systems reviewed and are negative.     Physical Exam  Temp:  [36.3 °C (97.4 °F)-37.2 °C (98.9 °F)] 36.8 °C (98.3 °F)  Pulse:  [105-155] 117  Resp:  [16-20] 18  BP: (108-176)/() 176/114  SpO2:  [92 %-100 %] 92 %    Physical Exam  Vitals and nursing note reviewed.   Constitutional:       General: She is not in acute distress.     Appearance: She is well-developed and underweight. She is ill-appearing. She is not diaphoretic.   HENT:      Head: Normocephalic and atraumatic.      Nose: Nose normal.      Mouth/Throat:      Mouth: Mucous membranes are moist.      Pharynx: Oropharynx is clear.   Eyes:      Extraocular Movements: Extraocular movements intact.      Conjunctiva/sclera: Conjunctivae normal.      Pupils: Pupils are equal, round, and reactive to light.   Cardiovascular:      Rate and Rhythm: Normal rate and regular rhythm.      Pulses: Normal pulses.      Heart sounds: Normal heart sounds.     No friction rub. No gallop.   Pulmonary:      Effort: Pulmonary effort is normal. No respiratory distress.      Breath sounds: Normal breath sounds. No wheezing or rales.   Abdominal:      General: Bowel sounds are normal. There is no distension.      Palpations: Abdomen is soft. There is no mass.       Tenderness: There is abdominal tenderness. There is no right CVA tenderness or left CVA tenderness.   Musculoskeletal:      Cervical back: Normal range of motion and neck supple.      Right lower leg: No edema.      Left lower leg: No edema.   Skin:     General: Skin is warm.      Coloration: Skin is not pale.      Findings: No erythema or rash.   Neurological:      General: No focal deficit present.      Mental Status: She is alert and oriented to person, place, and time.      Cranial Nerves: No cranial nerve deficit.      Coordination: Coordination normal.   Psychiatric:         Mood and Affect: Mood normal.         Behavior: Behavior normal.         Thought Content: Thought content normal.         Judgment: Judgment normal.       Fluids    Intake/Output Summary (Last 24 hours) at 8/27/2022 1526  Last data filed at 8/27/2022 1400  Gross per 24 hour   Intake 500 ml   Output 2650 ml   Net -2150 ml       Laboratory  Recent Labs     08/25/22 1519 08/25/22 1859 08/26/22  0645 08/26/22  1440 08/27/22  0054 08/27/22  1041   WBC 2.1* 3.0* 5.4  --   --   --    RBC 2.74* 2.37* 3.04*  --   --   --    HEMOGLOBIN 7.4* 6.4* 8.5* 8.1* 9.2* 9.1*   HEMATOCRIT 25.3* 21.2* 27.4* 25.4* 31.1* 30.2*   MCV 92.3 89.5 90.1  --   --   --    MCH 27.0 27.0 28.0  --   --   --    MCHC 29.2* 30.2* 31.0*  --   --   --    RDW 63.8* 62.1* 56.9*  --   --   --    PLATELETCT 194 213 239  --   --   --    MPV 9.0 9.3 9.9  --   --   --      Recent Labs     08/25/22  1519 08/25/22  1859 08/26/22  0645 08/27/22  0054   SODIUM 126*  --  125* 130*   POTASSIUM 4.5 3.9 5.2 4.6   CHLORIDE 85*  --  87* 93*   CO2 29  --  27 21   GLUCOSE 80  --  74 64*   BUN 40*  --  50* 24*   CREATININE 3.68*  --  4.35* 2.93*   CALCIUM 8.6  --  8.3* 8.7         No results for input(s): NTPROBNP in the last 72 hours.        Imaging  reviewed    Assessment/Plan  1.ESRD/HD-on MWF schedule  2.HTN: elevated BP -could be from N/V/pain -to monitor  3.Electrolytes: hyponatremia  -avoid hypotonic solutions  4.Anemia: GIB -Hb stable  5.Volume:well controlled with UF/HD    Recs:  HD MWF  Monitor BMP, Hb  Renal diet  All meds to renal doses  D/w Dr: Ferny Hillman  Will follow

## 2022-08-28 NOTE — PROGRESS NOTES
Patient BP 79/51. Dr Isaacs notified and no reply at this time. No new orders at this time. Patient feels tired but otherwise asymptomatic.

## 2022-08-28 NOTE — PROGRESS NOTES
Bedside report received from Crystal MARTINEZ and assumed Pt's cares. Call light within reach. Safety measures in place.

## 2022-08-28 NOTE — PROGRESS NOTES
Received bedside report from MICHELLE Vu, pt care assumed. VS stable, pt assessment complete. Pt A&Ox4, 10/10 pain at this time, with pt utilizing rest until analgesic was available at 2050. The patient's mother was at the bedside. POC discussed with pt and verbalizes no questions. Pt requested to talk to the doctor. Dr Brice was contacted by RN at 2016 and replied that he would come to the patient's bedside after admitting another patient. Bed locked and in lowest position. Call light within reach, hourly rounding initiated.

## 2022-08-28 NOTE — DISCHARGE SUMMARY
"Discharge Summary    CHIEF COMPLAINT ON ADMISSION  Chief Complaint   Patient presents with    Abdominal Pain     Onset yesterday of mid upper abd pain with bloody vomit       Reason for Admission  Nausea, Lightheaded, N/V, Blood in*     Admission Date  8/25/2022    CODE STATUS  Full Code    HPI & HOSPITAL COURSE  As per chart review:  \"26-year-old woman with a history of end-stage renal disease on Monday Wednesday Friday dialysis secondary to lupus.  She has a history of chronic anemia and receives Epogen with dialysis under the care of her nephrologist Dr. Trent.  She has somewhat acute on chronic nausea and vomiting issues, progressing to hematemesis last night and this morning for which she presented to the emergency department for further evaluation.  Hemoglobin was observed to be 6.2 on labs.  She has received multiple transfusions in the past and is somewhat difficult to type and screen due to antibody issues.  She is requesting Benadryl and Tylenol pretransfusion.  Case was discussed with GI, given her history of Karen-Keyes tears and gastritis in the past she will be provisioned with a PPI drip and EGD will be performed.\"    The patient was admitted for further management and care.  It seems as per chart review the patient received 2 units of blood transfusion.  The patient also underwent EGD which found gastric antral vascular ectasia with active oozing, status post APC with successful hemostasis.    During this hospitalization the patient was also found to have nausea and vomiting.  The day after the EGD, the patient was having vomiting with blood, however hemoglobin remained stable.  We discussed the case with GI who believed this could be retained blood from previous bruising.  Due to the fact that hemoglobin did not trend down and the patient's nausea improved, we will discharge the patient today.    The patient had her blood pressure on the lower side this morning, however she is asymptomatic and she " "takes multiple blood pressure medications including clonidine.  The patient would like to go home, currently asymptomatic.  There are no signs of bleeding at this time.  Today the latest hemoglobin at 10 AM was 7.7, however after her second transfusion Hb, was 8.1.  She is not vomiting blood anymore and there are no signs of bleeding at this time she is asymptomatic and would like to be discharged home.  She was advised if symptoms recur to come back to the hospital for further management and assessment.    The patient was seen at bedside this morning.  The patient mentions she feels much better and back to her baseline and would like to be discharged home.  We will discharge patient home.  Patient mentioned that she has antinausea medications at home that worked for her she just wants to go home.  The patient seems to be back at her baseline.  She will require close follow-up with PCP, GI, nephrology and her rheumatologist.    Therefore, she is discharged in fair and stable condition to home with close outpatient follow-up.    The patient met 2-midnight criteria for an inpatient stay at the time of discharge.    Discharge Date  08/28/2022    FOLLOW UP ITEMS POST DISCHARGE  The patient will require close follow-up with PCP, nephrology and her rheumatologist.  She will also require close follow-up with GI.    DISCHARGE DIAGNOSES  Principal Problem:    GIB (gastrointestinal bleeding) POA: Yes  Active Problems:    ESRD (end stage renal disease) (HCC) POA: Unknown    HTN (hypertension) POA: Yes      Overview: \"Controlled with medication\"    Lupus (HCC) POA: Yes    Anemia associated with acute blood loss POA: Yes    Chronic respiratory failure with hypoxia (HCC) POA: Yes    Hyponatremia POA: Yes  Resolved Problems:    * No resolved hospital problems. *      FOLLOW UP  Future Appointments   Date Time Provider Department Center   10/7/2022  2:30 PM London Leone D.O. [facility] [address] [facility]   10/11/2022  1:15 PM [facility] None   10/14/2022  8:30 AM [facility] CT TRAUMA [address]   11/3/2022  1:00 PM Trevor Oliveira M.D. [facility] None     Ella Matthew M.D.  [address]  [facility] [id number] [phone]    Schedule an appointment as soon as possible for a visit      Melinda Trent M.D.  [address]  [address]  [facility] [id number] [phone]    Schedule an appointment as soon as possible for a visit      Parveen Wood M.D.  [address] [facility] [address]  [phone]    Schedule an appointment as soon as possible for a visit        MEDICATIONS ON DISCHARGE     Medication List        CONTINUE taking these medications        Instructions   amLODIPine 5 MG Tabs  Commonly known as: NORVASC   Take 5 mg by mouth at bedtime.  Dose: 5 mg     atenolol 25 MG Tabs  Commonly known as: TENORMIN   Take 25 mg by mouth every morning.  Dose: 25 mg     cloNIDine 0.2 MG/24HR Ptwk patch  Commonly known as: CATAPRES   Place 1 Patch on the skin every thursday.  Dose: 1 Patch     cloNIDine 0.3 MG Tabs  Commonly known as: CATAPRES   Take 0.3 mg by mouth 2 times a day.  Dose: 0.3 mg     HYDROmorphone 2 MG Tabs  Commonly known as: DILAUDID   Take 2 mg by mouth 1 time a day as needed for Severe Pain.  Dose: 2 mg     hydroxychloroquine 200 MG Tabs  Commonly known as: Plaquenil   Take 200 mg by mouth at bedtime.  Dose: 200 mg     mycophenolate sodium 360 MG Tbec tablet  Commonly known as: MYFORTIC   Take 360 mg by mouth at bedtime.  Dose: 360 mg     ondansetron 4 MG Tbdp  Commonly known as: ZOFRAN ODT   Take 4 mg by mouth every 6 hours as needed for Nausea.  Dose: 4 mg     pantoprazole 40 MG Tbec  Commonly known as: PROTONIX   Take 1 tablet by mouth 2 times a day.  Dose: 40 mg     predniSONE 5 MG Tabs  Commonly known as: DELTASONE   Take 5 mg by mouth every morning.  Dose: 5 mg              Allergies  Allergies   Allergen Reactions    Cephalexin Rash     Nausea and rash   Hives  .    Clindamycin Rash     Nausea and rash     Hive  .    Hydrocodone Rash and  Nausea     Rash      Maxipime [Cefepime] Itching    Methylprednisolone Unspecified     Anxious      Tolerates prednisone     Metoprolol Rash and Nausea     Nausea and rash     Tolerates antenalol    Compazine Anxiety    Fentanyl And Related Anxiety    Metoclopramide Anxiety    Tape Rash     Paper tape is ok       DIET  Orders Placed This Encounter   Procedures    Diet Order Diet: Low Fiber(GI Soft) (renal)     Standing Status:   Standing     Number of Occurrences:   1     Order Specific Question:   Diet:     Answer:   Low Fiber(GI Soft) [2]     Comments:   renal       ACTIVITY  As tolerated.  Weight bearing as tolerated    CONSULTATIONS  GI  Nephrology    PROCEDURES  ESOPHAGOGASTRODUODENOSCOPY, gastroscopy with argon plasma coagulation.    EGD found esophagitis, no Karen-Keyes tear, gastric antral vascular ectasia with active oozing, status post APC with successful hemostasis, duodenitis in the duodenal bulb.      [id number] VIEW   Final Result      No evidence of bowel obstruction.           LABORATORY  Lab Results   Component Value Date    SODIUM 130 (L) 08/27/2022    POTASSIUM 4.6 08/27/2022    CHLORIDE 93 (L) 08/27/2022    CO2 21 08/27/2022    GLUCOSE 64 (L) 08/27/2022    BUN 24 (H) 08/27/2022    CREATININE 2.93 (H) 08/27/2022        Lab Results   Component Value Date    WBC 5.4 08/26/2022    HEMOGLOBIN 9.3 (L) 08/27/2022    HEMATOCRIT 30.9 (L) 08/27/2022    PLATELETCT 239 08/26/2022        Total time of the discharge process exceeds 35 minutes.

## 2022-08-28 NOTE — CARE PLAN
The patient is Stable - Low risk of patient condition declining or worsening    Shift Goals  Clinical Goals: control nausea/vomiting, control pain  Patient Goals: speak to doctor, manage pain and vomiting    Progress made toward(s) clinical / shift goals:    Problem: Knowledge Deficit - Standard  Goal: Patient and family/care givers will demonstrate understanding of plan of care, disease process/condition, diagnostic tests and medications  Outcome: Progressing     Problem: Pain - Standard  Goal: Alleviation of pain or a reduction in pain to the patient’s comfort goal  Outcome: Progressing  Note: Patient was able to sleep throughout the night without asking for more pain medication.      Problem: Fall Risk  Goal: Patient will remain free from falls  Outcome: Progressing     Problem: Risk for Bleeding  Goal: Patient will not experience bleeding as evidenced by normal blood pressure, stable hematocrit and hemoglobin levels and desired ranges for coagulation profiles  Outcome: Progressing  Note: Patient's H/H is improving.       Patient is not progressing towards the following goals: NA

## 2022-08-28 NOTE — PROGRESS NOTES
Telemetry Shift Summary     Rhythm ST  HR Range 109-133, up to 157  Ectopy   Measurements  0.14/0.08/0.34       Normal Values  Rhythm SR  HR Range    Measurements 0.12-0.20 / 0.06-0.10  / 0.30-0.52

## 2022-08-28 NOTE — PROGRESS NOTES
Monitor Summary:    Rhythm:  ST/SR   Rate Range:    Ectopy: Rare PVCs    Measurements:  0.16/0.08/0.36

## 2022-08-31 NOTE — ED PROVIDER NOTES
"ED Provider Note    CHIEF COMPLAINT  Chief Complaint   Patient presents with    Low Back Pain     today        [facility]  Lily Nicole is a 26 y.o. female who presents to the ED with complaints of back pain.  The patient has a longstanding history of chronic back pain primarily within the lower back and sacroiliac regions.  She does have a pain specialist for this.  She states that the pain just got worse today and she is run out of her Dilaudid.  The patient does have an appointment tomorrow with her pain specialist.  Patient states that she just could not handle the pain so she contacted the ambulance who did give her some morphine and brought her to the hospital for further evaluation and care.  Upon arrival here she states that is just got worse but it is chronic pain denies any fevers chills or any other symptoms.  The patient does have a history of end-stage renal disease and on chronic oxygen 4 L at baseline.    REVIEW OF SYSTEMS  See [facility] for further details. All other systems are negative.     PAST MEDICAL HISTORY  Past Medical History:   Diagnosis Date    Anemia 01/17/2018    AVF (arteriovenous fistula) ([facility])     Right Arm    Chest pain     Chest tightness     Daytime sleepiness     Dialysis patient ([facility])      dialysis, M,W,F Elizabeth/Joni    Difficulty breathing     ESRD (end stage renal disease) on dialysis ([facility]) 01/17/2018    Twan Fu for breath     Heart burn     Hypertension 01/17/2018    \"Controlled with medication\"    Indigestion     Lupus ([facility])     Migraines 01/17/2018    Painful breathing     Palpitations     Seizure ([facility]) 2013    from high blood pressure, reports 1 time event    Shortness of breath     Swelling of lower extremity     Wheezing        FAMILY HISTORY  Family History   Problem Relation Age of Onset    Diabetes Paternal Grandmother        SOCIAL HISTORY  Social History     Socioeconomic History    Marital status: Single   Tobacco Use    Smoking status: Never    Smokeless " tobacco: Never   Vaping Use    Vaping Use: Never used   Substance and Sexual Activity    Alcohol use: No    Drug use: No      Ella Matthew M.D.      SURGICAL HISTORY  Past Surgical History:   Procedure Laterality Date    DC UPPER GI ENDOSCOPY,DIAGNOSIS N/A 8/26/2022    Procedure: ESOPHAGOGASTRODUODENOSCOPY;  Surgeon: Parveen Wood M.D.;  Location: [facility];  Service: Gastroenterology    DC UPPER GI ENDOSCOPY,CTRL BLEED N/A 8/26/2022    Procedure: GASTROSCOPY, WITH ARGON PLASMA COAGULATION;  Surgeon: Parveen Wood M.D.;  Location: [facility];  Service: Gastroenterology    DC BRONCHOSCOPY,DIAGNOSTIC Bilateral 5/13/2021    Procedure: BRONCHOSCOPY, BRONCHOALVEOLAR LAVAGE;  Surgeon: William Spangler M.D.;  Location: [facility];  Service: Pulmonary    DC UPPER GI ENDOSCOPY,DIAGNOSIS N/A 3/19/2021    Procedure: GASTROSCOPY;  Surgeon: Waylon Mcqueen M.D.;  Location: [facility];  Service: Gastroenterology    DC UPPER GI ENDOSCOPY,CTRL BLEED  3/19/2021    Procedure: GASTROSCOPY, WITH ARGON PLASMA COAGULATION;  Surgeon: Waylon Mcqueen M.D.;  Location: [facility];  Service: Gastroenterology    DC UPPER GI ENDOSCOPY,DIAGNOSIS  3/5/2021    Procedure: GASTROSCOPY - W/HEMOSTASIS;  Surgeon: Ej Silva M.D.;  Location: [facility];  Service: Gastroenterology    DC COLONOSCOPY,DIAGNOSTIC  1/8/2021    Procedure: COLONOSCOPY;  Surgeon: Herbert Contreras M.D.;  Location: [facility];  Service: Gastroenterology    DC UPPER GI ENDOSCOPY,DIAGNOSIS  1/8/2021    Procedure: GASTROSCOPY;  Surgeon: Herbert Contreras M.D.;  Location: [facility];  Service: Gastroenterology    DC UPPER GI ENDOSCOPY,CTRL BLEED  1/8/2021    Procedure: GASTROSCOPY, WITH ARGON PLASMA COAGULATION;  Surgeon: Herbert Contreras M.D.;  Location: [facility];  Service: Gastroenterology    DC UPPER GI ENDOSCOPY,CTRL BLEED  11/12/2020    Procedure: GASTROSCOPY, WITH ARGON  "PLASMA COAGULATION;  Surgeon: Gadiel Whitney M.D.;  Location: [facility];  Service: Gastroenterology    MS UPPER GI ENDOSCOPY,BIOPSY  11/12/2020    Procedure: GASTROSCOPY, WITH BIOPSY;  Surgeon: Gadiel Whitney M.D.;  Location: [facility];  Service: Gastroenterology    GASTROSCOPY-ENDO  11/12/2020    Procedure: GASTROSCOPY;  Surgeon: Gadiel Whitney M.D.;  Location: [facility];  Service: Gastroenterology    GASTROSCOPY-ENDO  9/18/2020    Procedure: GASTROSCOPY;  Surgeon: Gadiel Whitney M.D.;  Location: [facility];  Service: Gastroenterology    GASTROSCOPY N/A 5/30/2020    Procedure: GASTROSCOPY;  Surgeon: Waylon Mcqueen M.D.;  Location: [facility];  Service: Gastroenterology    GASTROSCOPY-ENDO  12/9/2019    Procedure: GASTROSCOPY;  Surgeon: Aaron Kam M.D.;  Location: [facility];  Service: Gastroenterology    ANGIOPLASTY  01/17/2018    \"Right Arm AV-Fistulagram & Angioplastyx3\"    ULI BY LAPAROSCOPY  4/5/2010    Performed by SYED MARTELL at SURGERY [facility]    AV FISTULA CREATION Right     OTHER      renal biopsy x 3    OTHER      bone marrow biopsy       CURRENT MEDICATIONS  Home Medications       Reviewed by Shefali Ontiveros (Pharmacy Tech) on 08/31/22 at 1359  Med List Status: Complete     Medication Last Dose Status   amLODIPine (NORVASC) 5 MG Tab 8/30/2022 Active   atenolol (TENORMIN) 25 MG Tab 8/31/2022 Active   cloNIDine (CATAPRES) 0.2 MG/24HR PATCH WEEKLY patch 8/25/2022 Active   cloNIDine (CATAPRES) 0.3 MG Tab 8/31/2022 Active   HYDROmorphone (DILAUDID) 2 MG Tab 8/31/2022 Active   hydroxychloroquine (PLAQUENIL) 200 MG Tab 8/30/2022 Active   mycophenolate sodium (MYFORTIC) 360 MG Tablet Delayed Response tablet 8/30/2022 Active   ondansetron (ZOFRAN ODT) 4 MG TABLET DISPERSIBLE 8/30/2022 Active   pantoprazole (PROTONIX) 40 MG Tablet Delayed Response 8/31/2022 Active   predniSONE (DELTASONE) 5 MG Tab " "8/31/2022 Active                    ALLERGIES  Allergies   Allergen Reactions    Cephalexin Rash     Nausea and rash   Hives  .    Clindamycin Rash     Nausea and rash     Hive  .    Hydrocodone Rash and Nausea     Rash      Maxipime [Cefepime] Itching    Methylprednisolone Unspecified     Anxious      Tolerates prednisone     Metoprolol Rash and Nausea     Nausea and rash     Tolerates antenalol    Compazine Anxiety    Fentanyl And Related Anxiety    Metoclopramide Anxiety    Tape Rash     Paper tape is ok       PHYSICAL EXAM  VITAL SIGNS: /72   Pulse 78   Temp 36.1 °C (97 °F) (Temporal)   Resp 13   Ht 1.702 m (5' 7\")   Wt 49.9 kg (110 lb) Comment: bib [facility]  SpO2 100%   BMI 17.23 kg/m²    Pulse Oximetry was obtained. It showed a reading of Pulse Oximetry: 100 %.  I interpreted this as nonhypoxic.     Constitutional: No acute distress, Non-toxic appearance.   HENT: Normocephalic, Atraumatic, Bilateral external ears normal, bilateral tympanic membranes normal, Oropharynx dry mucous membranes, No oral exudates, Nose normal.   Eyes:  conjunctiva is normal, there are no signs of exudate.   Neck: Nontender midline  Lymphatic: No lymphadenopathy noted.   Cardiovascular: Regular rate and rhythm with a grade 3/6 murmur, no gallops or rubs.   Thorax & Lungs: Lungs are clear to auscultation bilaterally, there are no wheezes no rales. Chest wall is nontender.  Abdomen: Soft, nontender nondistended. Bowel sounds are present.   Skin: Warm, Dry, No erythema,   Back: Patient is tender about the sacral lumbar region as well as in the sacroiliac joints on the pelvis.  Rest of the back is normal  Musculoskeletal: Good range of motion in all major joints.  She does have discomfort within both hips which is chronic in nature.  No cyanosis or edema.  Neurologic: Alert & oriented x 3, Normal motor function, Normal sensory function, No focal deficits noted.   Psychiatric: Affect normal, Judgment normal, Mood normal. "         RADIOLOGY/PROCEDURES  DX-SACROILIAC JOINTS 3+   Final Result      Negative exam of the sacroiliac joints.                COURSE & MEDICAL DECISION MAKING  Pertinent Labs & Imaging studies reviewed. (See chart for details)  Patient presents.  The patient states that this is just an acute exacerbation of her chronic syndrome.  She has had CT scans of this in the past.  I just wanted to ensure that there was no change by doing x-rays again there is no change to her joints there is no change to her pelvis.  I did give her 1 dose of IV Dilaudid she is feeling much improved she did have some anxiety and requested some Benadryl and that seemed to help.  At this point the patient will follow-up with her pain specialist tomorrow.  I do for the patient stable for discharge.    FINAL IMPRESSION  1. Midline low back pain without sciatica, unspecified chronicity        2. ESRD (end stage renal disease) on dialysis (HCC)        3. Sacroiliac pain                    Electronically signed by: Marcos Whalen M.D., 8/31/2022 12:53 PM

## 2022-08-31 NOTE — ED TRIAGE NOTES
Pt to er with c/o sudden onset low back pain today. Took dilaudid at home, 0500 po, with relief , had dialysis, then pain returned w/o interventions, called [facility]. Pt has lupus, on oxygen at 4L n/c base line, recvd morphone iv en route w/o chg in pain

## 2022-08-31 NOTE — ED NOTES
"Per pt, she is anuric, has had \"pain flare ups\" r/t lupus. In no apparnet distress depsite c/o 10/10 low back pain. Hot pack provided  "

## 2022-09-12 NOTE — PROGRESS NOTES
ANTICOAGULATION     Ju Edwards is overdue for INR check.      Left message for patient to call and schedule lab appointment as soon as possible. If returning call, please schedule.     GRANT CORTEZ RN     Received bedside report.  Assumed pt care.   Assessment and chart check complete.   Pt is AA0X4, no signs of distress.   Pt is nauseous, medicated per MAR.  Reports lower back pain, heat pack applied.  1031-started hemodialysis at bedside c/o dialysis RN.  1227- pre medication given prior to blood transfusion.  No untoward signs and symptoms while in blood transfusion.  Fall precautions in place, treaded socks on pt, bed in low position.  Call light within reach.   Educated pt to call if needing anything.

## 2022-09-18 NOTE — ASSESSMENT & PLAN NOTE
-With underlying pulmonary edema.  Hemodialysis in the morning and closely follow-up morning labs.   appears normal and intact

## 2022-09-21 NOTE — ED PROVIDER NOTES
"ED Provider Note    CHIEF COMPLAINT  No chief complaint on file.      HPI  Liyl Nicole is a 26 y.o. female with a history of end-stage renal disease on chronic home oxygen and Dilaudid therapy daily for chronic back pain.  She comes today with concerns for acute on chronic pain in \"all of my joints \".  She says that she took her normal pain medication at 1 in the morning but this did little to temper her 10 out of 10 all of her joint pain she describes as both sharp and dull.  She is currently only managed with opiates and under the care of a pain management specialist.  She takes Dilaudid and has multiple allergies.  She arrives with an initial pulse oximetry of 72% however this had a poor waveform and was retaken and found to be normal on her normal 4 L oxygen.  She denies any fever, chills, sweats, cough, difficulty breathing and is scheduled to get dialysis today and is on a Monday Wednesday Friday schedule    REVIEW OF SYSTEMS  See HPI for further details. All other systems are negative.     PAST MEDICAL HISTORY  Past Medical History:   Diagnosis Date    Anemia 01/17/2018    AVF (arteriovenous fistula) ([facility])     Right Arm    Chest pain     Chest tightness     Daytime sleepiness     Dialysis patient ([facility])      dialysis, M,W,F Elizabeth/Joni    Difficulty breathing     ESRD (end stage renal disease) on dialysis ([facility]) 01/17/2018    Twan Fu for breath     Heart burn     Hypertension 01/17/2018    \"Controlled with medication\"    Indigestion     Lupus ([facility])     Migraines 01/17/2018    Painful breathing     Palpitations     Seizure ([facility]) 2013    from high blood pressure, reports 1 time event    Shortness of breath     Swelling of lower extremity     Wheezing        FAMILY HISTORY  Family History   Problem Relation Age of Onset    Diabetes Paternal Grandmother        SOCIAL HISTORY   reports that she has never smoked. She has never used smokeless tobacco. She reports that she does not drink alcohol " and does not use drugs.    SURGICAL HISTORY  Past Surgical History:   Procedure Laterality Date    ME UPPER GI ENDOSCOPY,DIAGNOSIS N/A 8/26/2022    Procedure: ESOPHAGOGASTRODUODENOSCOPY;  Surgeon: Parveen Wood M.D.;  Location: [facility];  Service: Gastroenterology    ME UPPER GI ENDOSCOPY,CTRL BLEED N/A 8/26/2022    Procedure: GASTROSCOPY, WITH ARGON PLASMA COAGULATION;  Surgeon: Parveen Wood M.D.;  Location: [facility];  Service: Gastroenterology    ME BRONCHOSCOPY,DIAGNOSTIC Bilateral 5/13/2021    Procedure: BRONCHOSCOPY, BRONCHOALVEOLAR LAVAGE;  Surgeon: William Spangler M.D.;  Location: [facility];  Service: Pulmonary    ME UPPER GI ENDOSCOPY,DIAGNOSIS N/A 3/19/2021    Procedure: GASTROSCOPY;  Surgeon: Waylon Mcqueen M.D.;  Location: [facility];  Service: Gastroenterology    ME UPPER GI ENDOSCOPY,CTRL BLEED  3/19/2021    Procedure: GASTROSCOPY, WITH ARGON PLASMA COAGULATION;  Surgeon: Waylon Mcqueen M.D.;  Location: [facility];  Service: Gastroenterology    ME UPPER GI ENDOSCOPY,DIAGNOSIS  3/5/2021    Procedure: GASTROSCOPY - W/HEMOSTASIS;  Surgeon: Ej Silva M.D.;  Location: [facility];  Service: Gastroenterology    ME COLONOSCOPY,DIAGNOSTIC  1/8/2021    Procedure: COLONOSCOPY;  Surgeon: Herbert Contreras M.D.;  Location: [facility];  Service: Gastroenterology    ME UPPER GI ENDOSCOPY,DIAGNOSIS  1/8/2021    Procedure: GASTROSCOPY;  Surgeon: Herbert Contreras M.D.;  Location: [facility];  Service: Gastroenterology    ME UPPER GI ENDOSCOPY,CTRL BLEED  1/8/2021    Procedure: GASTROSCOPY, WITH ARGON PLASMA COAGULATION;  Surgeon: Herbert Contreras M.D.;  Location: [facility];  Service: Gastroenterology    ME UPPER GI ENDOSCOPY,CTRL BLEED  11/12/2020    Procedure: GASTROSCOPY, WITH ARGON PLASMA COAGULATION;  Surgeon: Gadiel Whitney M.D.;  Location: [facility];  Service: Gastroenterology    ME UPPER GI  "ENDOSCOPY,BIOPSY  11/12/2020    Procedure: GASTROSCOPY, WITH BIOPSY;  Surgeon: Gadiel Whitney M.D.;  Location: [facility];  Service: Gastroenterology    GASTROSCOPY-ENDO  11/12/2020    Procedure: GASTROSCOPY;  Surgeon: Gadiel Whitney M.D.;  Location: [facility];  Service: Gastroenterology    GASTROSCOPY-ENDO  9/18/2020    Procedure: GASTROSCOPY;  Surgeon: Gadiel Whitney M.D.;  Location: [facility];  Service: Gastroenterology    GASTROSCOPY N/A 5/30/2020    Procedure: GASTROSCOPY;  Surgeon: Waylon Mcqueen M.D.;  Location: [facility];  Service: Gastroenterology    GASTROSCOPY-ENDO  12/9/2019    Procedure: GASTROSCOPY;  Surgeon: Aaron Kam M.D.;  Location: [facility];  Service: Gastroenterology    ANGIOPLASTY  01/17/2018    \"Right Arm AV-Fistulagram & Angioplastyx3\"    ULI BY LAPAROSCOPY  4/5/2010    Performed by SYED MARTELL at SURGERY [facility]    AV FISTULA CREATION Right     OTHER      renal biopsy x 3    OTHER      bone marrow biopsy       CURRENT MEDICATIONS  Home Medications    **Home medications have not yet been reviewed for this encounter**         ALLERGIES  Allergies   Allergen Reactions    Cephalexin Rash     Nausea and rash   Hives  .    Clindamycin Rash     Nausea and rash     Hive  .    Hydrocodone Rash and Nausea     Rash      Maxipime [Cefepime] Itching    Methylprednisolone Unspecified     Anxious      Tolerates prednisone     Metoprolol Rash and Nausea     Nausea and rash     Tolerates antenalol    Compazine Anxiety    Fentanyl And Related Anxiety    Metoclopramide Anxiety    Tape Rash     Paper tape is ok       PHYSICAL EXAM  VITAL SIGNS: BP (!) 144/106   Pulse 89   Temp 36.6 °C (97.9 °F) (Temporal)   Resp 16   Ht 1.702 m (5' 7\")   Wt 47.6 kg (105 lb)   SpO2 (!) 72%   BMI 16.45 kg/m²    Constitutional: Thin body habitus with anxious appearance.   HENT: Normocephalic, Atraumatic, Bilateral external ears " "normal, Oropharynx is clear mucous membranes are moist. No oral exudates or nasal discharge.   Eyes: Pupils are equal round and reactive, EOMI, Conjunctiva normal, No discharge.   Neck: Normal range of motion, No tenderness, Supple, No stridor. No meningismus.  Lymphatic: No lymphadenopathy noted.   Cardiovascular: Regular rate and rhythm without murmur rub or gallop.  Thorax & Lungs: Clear breath sounds bilaterally without wheezes, rhonchi or rales. There is no chest wall tenderness.   Abdomen: Soft non-tender non-distended. There is no rebound or guarding. No organomegaly is appreciated. Bowel sounds are normal.  Skin: Normal without rash.   Back: No CVA or spinal tenderness.   Extremities: Intact distal pulses, No edema, No tenderness, No cyanosis, No clubbing. Capillary refill is less than 2 seconds.  AV shunt right upper extremity with palpable thrill  Musculoskeletal: Good range of motion in all major joints. No tenderness to palpation or major deformities noted.   Neurologic: Alert & oriented x 3, Normal motor function, Normal sensory function, No focal deficits noted. Reflexes are normal.  Psychiatric: Affect normal, Judgment normal, Mood quite anxious. There is no suicidal ideation or patient reported hallucinations.       COURSE & MEDICAL DECISION MAKING  Pertinent Labs & Imaging studies reviewed. (See chart for details)  The patient has \"pain all over \"and it seems mostly neuropathic by her description.  She is currently not on any neuropathic agents and technically failed narcotic therapy prior to arrival and I am not keen on giving more narcotic as this does not seem to be working.    After reviewing her medication allergy list which was extensive and speaking with her about urine production which she apparently does not make any urine now for quite some time, I felt comfortable giving her a regimen of ketamine, Versed, Toradol, Decadron and Zofran.  In addition I gave her oral Neurontin 200 mg.  We " discussed risks and benefits of these medications prior to administration but she remained anxious about therapy.    Additionally she tells us that her dialysis center was expecting her at 6 AM.  Since she has missed her appointment we are trying to speak with him about possibly getting her dialyzed by midmorning.  Lab work shows slight hyperkalemia at 6.1 and I administered bicarb as a temporizing agent.  I believe she is often in the low 6 range prior to dialysis and feel she would be safe to be dialyzed as an outpatient if there is a chair available    At 8:25 AM I spoke with Dr. Trent her nephrologist of record.  She will try to call over to [place] and see if she can get her a chair for dialysis today.  This is a reasonable plan of care.    Dr. Trent arranged for a dialysis appointment at 10:45 AM at [facility].  The patient is feeling better after medication administration for pain and she understands that she can have a discussion with her outpatient physician regarding pain management and potentially adding a neuropathic agent in the near future.    FINAL IMPRESSION  1. Arthralgia, unspecified joint    2. Hyperkalemia    3. Chronic renal failure, unspecified CKD stage    4. Dialysis patient (HCC)             Electronically signed by: Gregory Julian M.D., 9/21/2022 6:30 AM

## 2022-09-21 NOTE — ED NOTES
Pt discharged home in stable condition. VSS.Follow-up care reviewed, all questions answered. Pt aware of dialysis appt today at [facility] in [place] at 1045. Pt wheeled out of ER with home oxygen and all belongings. Mom driving pt to dialysis/home. Pt alert, oriented, VSS. PIV removed prior to dc.

## 2022-09-21 NOTE — DISCHARGE INSTRUCTIONS
Please proceed to dialysis center for your appointment immediately.  The appointment time is officially 1045 but they would like you to arrive early.  This is at [facility]

## 2022-09-21 NOTE — ED NOTES
Pharmacy Medication Reconciliation      ~Medication reconciliation updated and complete per patient at bedside  ~Allergies have been verified and updated   ~No oral ABX within the last 30 days  ~Patient home pharmacy: [facility]

## 2022-09-21 NOTE — ED NOTES
Pt resting with eyes closed following pain medication administration. Respirations even and unlabored, remains on monitor with SpO2 100% on 4L, RR 12. No acute distress.

## 2022-09-21 NOTE — ED TRIAGE NOTES
PT reports chronic joint pain sees pain management with no relief. Reports all over joint pain started Sunday and has progressed and worsen. JUDAH M/W/F

## 2022-09-23 NOTE — ED TRIAGE NOTES
"Pt into 7A  Chief Complaint   Patient presents with    Blood in Vomit     Pt BIB [facility] after 1 episode of dark brown emesis at home. Pt also having R arm and generalized abd pain. Pt has a hx of Lupus and kidney failure and has dialysis M/W/F. Pt completed dialysis on Wednesday. Denies nausea, diarrhea, fevers at home.       /68   Pulse (!) 111   Temp 36.7 °C (98 °F) (Temporal)   Resp 18   Ht 1.702 m (5' 7\")   Wt 49.9 kg (110 lb)   SpO2 93%   BMI 17.23 kg/m²     "

## 2022-09-23 NOTE — ED NOTES
Pt requesting to leave AMA. MD notified. Form signed at the bedside. IV removed. Pt requesting wheelchair provided.

## 2022-09-23 NOTE — ED PROVIDER NOTES
"ED Provider Note  CHIEF COMPLAINT  Chief Complaint   Patient presents with    Blood in Vomit     Pt BIB [facility] after 1 episode of dark brown emesis at home. Pt also having R arm and generalized abd pain. Pt has a hx of Lupus and kidney failure and has dialysis M/W/F. Pt completed dialysis on Wednesday. Denies nausea, diarrhea, fevers at home.         HPI  Lily Nicole is a 26 y.o. female who presents for evaluation of pain \"all over\" due to a \"lupus flareup.\"  Patient also notes incidentally that she had an episode of dark brown emesis at home tonight.  She was seen yesterday for similar pain but had not been vomiting blood.  She had been seen a few weeks ago and admitted for an upper GI bleed.  She had have a 2 units transfused at that time because of a hemoglobin of 6.2.  Today, she states that the pain is her chief complaint and she thinks the vomiting was an isolated episode.    REVIEW OF SYSTEMS  Constitutional: No fevers or chills.  Fatigue and malaise noted.  Skin: No rashes  HEENT: No sore throat, runny nose  Neck: No neck pain  Chest: No pain or rashes  Pulm: No shortness of breath, cough, wheezing, stridor, or pain with inspiration/expiration  Gastrointestinal: No diarrhea, constipation, bloating, melena, hematochezia or abdominal pain.  Genitourinary: ESRD, dialysis yesterday  Musculoskeletal: No recent trauma, pain, swelling, weakness  Neurologic: No new sensory changes or muscular weakness.  Generalized muscle weakness noted.  Heme: Recent GI bleed, chronic anemia  Immuno: On ESRD    PAST FAM HISTORY  Family History   Problem Relation Age of Onset    Diabetes Paternal Grandmother        PAST MEDICAL HISTORY   has a past medical history of Anemia (01/17/2018), AVF (arteriovenous fistula) ([facility]), Chest pain, Chest tightness, Daytime sleepiness, Dialysis patient ([facility]), Difficulty breathing, ESRD (end stage renal disease) on dialysis ([facility]) (01/17/2018), Gasping for breath, Heart burn, Hypertension " "(01/17/2018), Indigestion, Lupus (HCC), Migraines (01/17/2018), Painful breathing, Palpitations, Seizure (HCC) (2013), Shortness of breath, Swelling of lower extremity, and Wheezing.    SOCIAL HISTORY  Social History     Tobacco Use    Smoking status: Never    Smokeless tobacco: Never   Vaping Use    Vaping Use: Never used   Substance and Sexual Activity    Alcohol use: No    Drug use: No    Sexual activity: Not on file       SURGICAL HISTORY   has a past surgical history that includes ronak by laparoscopy (4/5/2010); av fistula creation (Right); angioplasty (01/17/2018); other; other; gastroscopy-endo (12/9/2019); gastroscopy (N/A, 5/30/2020); gastroscopy-endo (9/18/2020); upper gi endoscopy,ctrl bleed (11/12/2020); upper gi endoscopy,biopsy (11/12/2020); gastroscopy-endo (11/12/2020); colonoscopy,diagnostic (1/8/2021); upper gi endoscopy,diagnosis (1/8/2021); upper gi endoscopy,ctrl bleed (1/8/2021); upper gi endoscopy,diagnosis (3/5/2021); upper gi endoscopy,diagnosis (N/A, 3/19/2021); upper gi endoscopy,ctrl bleed (3/19/2021); bronchoscopy,diagnostic (Bilateral, 5/13/2021); upper gi endoscopy,diagnosis (N/A, 8/26/2022); and upper gi endoscopy,ctrl bleed (N/A, 8/26/2022).    CURRENT MEDICATIONS  Home Medications    **Home medications have not yet been reviewed for this encounter**         ALLERGIES  Allergies   Allergen Reactions    Cephalexin Rash     Nausea and rash   Hives  .    Clindamycin Rash     Nausea and rash     Hive  .    Hydrocodone Rash and Nausea     Rash      Maxipime [Cefepime] Itching    Methylprednisolone Unspecified     Anxious      Tolerates prednisone     Metoprolol Rash and Nausea     Nausea and rash     Tolerates antenalol    Compazine Anxiety    Fentanyl And Related Anxiety    Metoclopramide Anxiety    Tape Rash     Paper tape is ok       PHYSICAL EXAM  VITAL SIGNS: /68   Pulse 70   Temp 36.7 °C (98 °F) (Temporal)   Resp 13   Ht 1.702 m (5' 7\")   Wt 49.9 kg (110 lb)   SpO2 93%  "  BMI 17.23 kg/m²    Gen: Lying right lateral recumbent, no apparent distress.  Appears older than stated age.  Appears underweight  HEENT: No signs of trauma, Bilateral external ears normal, Nose normal. Conjunctiva normal, Non-icteric.   Cardiovascular: Regular rate and rhythm, no murmurs.  Capillary refill less than 3 seconds to all extremities, 2+ distal pulses.  Thorax & Lungs: Normal breath sounds, No respiratory distress, No wheezing bilateral chest rise  Abdomen: Bowel sounds normal, Soft, No tenderness, No masses, No pulsatile masses. No Guarding or rebound  Skin: Warm, Dry.  Sallow complex  Back: Diffuse joint tenderness, worse in the hands and shoulders  Extremities: Intact distal pulses, No edema  Neurologic: Alert , no facial droop, grossly normal coordination   Psychiatric: Affect calm, pleasant      LABS  Results for orders placed or performed during the hospital encounter of 09/22/22   CBC WITH DIFFERENTIAL   Result Value Ref Range    WBC 9.7 4.8 - 10.8 K/uL    RBC 2.17 (L) 4.20 - 5.40 M/uL    Hemoglobin 6.1 (L) 12.0 - 16.0 g/dL    Hematocrit 20.7 (L) 37.0 - 47.0 %    MCV 95.4 81.4 - 97.8 fL    MCH 28.1 27.0 - 33.0 pg    MCHC 29.5 (L) 33.6 - 35.0 g/dL    RDW 56.2 (H) 35.9 - 50.0 fL    Platelet Count 176 164 - 446 K/uL    MPV 9.8 9.0 - 12.9 fL    Neutrophils-Polys 92.80 (H) 44.00 - 72.00 %    Lymphocytes 4.30 (L) 22.00 - 41.00 %    Monocytes 2.30 0.00 - 13.40 %    Eosinophils 0.10 0.00 - 6.90 %    Basophils 0.10 0.00 - 1.80 %    Immature Granulocytes 0.40 0.00 - 0.90 %    Nucleated RBC 0.00 /100 WBC    Neutrophils (Absolute) 8.99 (H) 2.00 - 7.15 K/uL    Lymphs (Absolute) 0.42 (L) 1.00 - 4.80 K/uL    Monos (Absolute) 0.22 0.00 - 0.85 K/uL    Eos (Absolute) 0.01 0.00 - 0.51 K/uL    Baso (Absolute) 0.01 0.00 - 0.12 K/uL    Immature Granulocytes (abs) 0.04 0.00 - 0.11 K/uL    NRBC (Absolute) 0.00 K/uL    Hypochromia 1+     Anisocytosis 1+     Macrocytosis 1+    COMP METABOLIC PANEL   Result Value Ref  Range    Sodium 129 (L) 135 - 145 mmol/L    Potassium 4.8 3.6 - 5.5 mmol/L    Chloride 88 (L) 96 - 112 mmol/L    Co2 27 20 - 33 mmol/L    Anion Gap 14.0 7.0 - 16.0    Glucose 84 65 - 99 mg/dL    Bun 61 (H) 8 - 22 mg/dL    Creatinine 4.10 (H) 0.50 - 1.40 mg/dL    Calcium 7.4 (L) 8.4 - 10.2 mg/dL    AST(SGOT) 14 12 - 45 U/L    ALT(SGPT) 5 2 - 50 U/L    Alkaline Phosphatase 72 30 - 99 U/L    Total Bilirubin 0.3 0.1 - 1.5 mg/dL    Albumin 2.1 (L) 3.2 - 4.9 g/dL    Total Protein 7.3 6.0 - 8.2 g/dL    Globulin 5.2 (H) 1.9 - 3.5 g/dL    A-G Ratio 0.4 g/dL   LIPASE   Result Value Ref Range    Lipase 17 7 - 58 U/L   LACTIC ACID   Result Value Ref Range    Lactic Acid 1.1 0.5 - 2.0 mmol/L   MAGNESIUM   Result Value Ref Range    Magnesium 1.3 (L) 1.5 - 2.5 mg/dL   ESTIMATED GFR   Result Value Ref Range    GFR (CKD-EPI) 15 (A) >60 mL/min/1.73 m 2   PLATELET ESTIMATE   Result Value Ref Range    Plt Estimation Normal    MORPHOLOGY   Result Value Ref Range    RBC Morphology Present    DIFFERENTIAL COMMENT   Result Value Ref Range    Comments-Diff see below          COURSE & MEDICAL DECISION MAKING  Patient arrives for evaluation and an episode of dark brown emesis at home tonight.  Patient minimizes the dark brown emesis and feels that is not the major problem.  She feels like the Dilaudid she takes at home is not taking care of the pain.  She wants the same thing that she had last night in terms of pain control and does not seem concerned about the bleeding or the fact that she appears to be markedly anemic today, to the point that she needs transfusion.  She is asking for pain medications continually and Dilaudid by name.  This is understandable considering her history but she states understanding that because there does not appear to be an acute source of the pain, and this is her chronic pain, I do not feel IV narcotics are in the patient's best interest.  She states understanding of the need for transfusion and likely  "admission for further evaluation of her upper GI bleed as this could have life-threatening consequences.    11:10 PM  Had a very carolann discussion with the patient and her mother regarding pain medications.  Patient is now overtly sobbing asking for pain medications.  She states she is going to leave if she does not get them.  I discussed with her that there is no acute issue and giving her IV controlled medications is not indicated and could be harmful given her anemic state.  Moreover, she does not have any new pathology that might explain severe enough pain to warrant IV narcotics, or even ketamine.  She states clear understanding that her anemia is the dangerous issue as is her history of likely hematemesis.  This will need to be reevaluated as an inpatient and she will need to be worked up for another transfusion.  She states that if she is not can to get IV Dilaudid and she will leave AGAINST MEDICAL ADVICE\" go to [place]\" to check into a different hospital.  She stated understanding that  her anemia is critical and that she could suffer life-threatening consequences,  loss of lifestyle, permanent injury, and even death  if it is not corrected by transfusion emergently.  Despite this, the patient wants to be discharged AGAINST MEDICAL ADVICE.  Based on my interaction with the patient she maintains a capacity to make and understand her own decisions as well as the consequences of those decisions.  She stated understanding she could return at any time if she wished to continue the work-up.      FINAL IMPRESSION  1. Upper GI bleed    2. Anemia, unspecified type    3. Other chronic pain        Electronically signed by: Dudley Kirk M.D., 9/22/2022 10:07 PM   [facility]  "

## 2022-10-09 NOTE — DISCHARGE INSTRUCTIONS
Take valacyclovir once after each episode of dialysis.  Follow-up with your doctor if not better in 5 to 6 days.  Return for fever, pus, dizziness, vomiting or ill appearance.  Take Tylenol for pain and Benadryl or hydroxyzine for itch.  
[FreeTextEntry1] : Symptomatic treatment of fever and/or pain discussed\par Covid test done POSITIVE\par Discussed covid, quarantine protocol, control measures\par Stat strep test ordered\par Throat culture, if POSITIVE, give Amoxicillin 875 mg BID x 10 days\par Hydrate well\par Handwashing and infection control discussed\par Return to office if febrile > 48 hours or if symptoms get worse\par Go to ER if unable to come to the office or during after hours, parent encouraged to call service first before doing so.\par DIscussed Paxlovid, mom declined

## 2022-10-14 NOTE — ED NOTES
Patient vitals reassessed  Patient and mother asking to go home as patient feels tired. Concerned about making it to dialysis tomorrow  States she will be seeing her doctor tomorrow as well.  Encouraged patient and mother to stay but both feel like returning home

## 2022-10-14 NOTE — ED TRIAGE NOTES
"Patient presents with complaints of generalized body aches and back discomfort since yesterday. Denies any fever or chills. Feels lightheaded when standing. Reports she feels like she needs a blood transfusion. Has been 2 weeks since her last transfusion. States she has prescribed Dilaudid for her chronic pain; last dose last night. Did not want to take any today \"and just came in\". Requesting morphine from EMS en route; unable to obtain IV access.  "

## 2022-10-21 NOTE — PROGRESS NOTES
Pt presented to infusion for transfusion. Blood already ordered this AM as pt needs special order blood. IV start attempted by several RN's without success. VAT team RN started PIV, labs drawn. Pt met parameters for 2 units. COD completed faster today as pt did not have antibodies. Pre-meds given. 2 units transfused without issue, benadryl given between units as ordered and prn for itching. PIV flushed and removed, gauze drsg placed. Pt helped into w/c by mom and pt left in her care in NAD. Emailed schedulers for next appt.

## 2022-12-07 NOTE — PROGRESS NOTES
Lily presents to IS for CBC/ COD.  Labs collected via 24g nexiva to left hand, gauze and coban to site.   Lily tolerated well, discharged in NAD, next appointment confirmed.

## 2022-12-07 NOTE — PROGRESS NOTES
Ayala from Lab called with critical result of 5.4 at 1911. Critical lab result read back to Ayala.     BB Communication placed for 2 units of PRBCs for Thursday. Called placed to Lily regarding Hgb = 5.4. Denies shortness of breath, chest pain, fatigue. Confirmed appointment for Thursday for 2 units of PRBCs. Educated Lily to go to ER if she become symptomatic.     Volate message sent to on-call physician Dr. Ortiz regarding Lily's Hgb.

## 2022-12-09 NOTE — PROGRESS NOTES
Assumed care of pt for remainder of PRBCs transfusion. Pt tolerated remainder of blood well, no s/s of reaction or complications noted. PIV removed, sterile gauze/coban applied to site. Pt discharged home to self care, via wheelchair on 6L [place] home oxygen. Next appt confirmed with pt mom at time of discharge.

## 2022-12-09 NOTE — PROGRESS NOTES
Pt arrived to IS, via wheelchair, for blood. Pt voices no new complaints. 22g PIV established in the L-FA, positive blood return noted. Pre-medications administered with no complications. 1st unit PRBCs transfused with no complications. PRN benadryl administered in between units d/t slight itching. 2nd unit PRBCs started with no complications. Approximately 1 hr into unit, pt c/o significant itching. Transfusion stopped, call placed to Dr. Ortiz (on call for Dr. Garcia), orders received to give additional 25mg IV benadryl and proceed with transfusion after resolution of symptoms. Additional benadryl administered with no complications. Pt reported resolution of symptoms after 15 minutes. Transfusion restarted at 120 mL/hr, report given to Gris FLANAGAN RN.

## 2022-12-21 NOTE — PROGRESS NOTES
Lily presents to IS for CBC/ COD.  Labs collected via 25 G butterfly needle to forearm, gauze and coban to site. Lily tolerated well, discharged in NAD, next appointment confirmed.

## 2022-12-21 NOTE — TELEPHONE ENCOUNTER
Attempted to contact pt regarding her labs last night. Hgb 11.1 and pt did not qualify for PRBCs. Spoke with pt momChikis, and she informed me she was with pt at her dialysis at this time. Discussed Hgb results with mom and that pt did not need transfusion tomorrow, and mom is in agreement with poc. Chikis informed RN that pt get's her Hgb drawn three times a week at dialysis, and she will monitor the numbers in case pt starts dropping, will contact her provider to get a sooner appt. Otherwise, mom confirms pt is scheduled to return to IS for repeat cbc/cod on Andrey. 3.

## 2023-01-01 ENCOUNTER — OUTPATIENT INFUSION SERVICES (OUTPATIENT)
Dept: ONCOLOGY | Facility: MEDICAL CENTER | Age: 28
End: 2023-01-01
Attending: INTERNAL MEDICINE
Payer: COMMERCIAL

## 2023-01-01 ENCOUNTER — TELEPHONE (OUTPATIENT)
Dept: ONCOLOGY | Facility: MEDICAL CENTER | Age: 28
End: 2023-01-01
Payer: COMMERCIAL

## 2023-01-01 ENCOUNTER — APPOINTMENT (OUTPATIENT)
Dept: RADIOLOGY | Facility: MEDICAL CENTER | Age: 28
DRG: 252 | End: 2023-01-01
Payer: COMMERCIAL

## 2023-01-01 ENCOUNTER — APPOINTMENT (OUTPATIENT)
Dept: RADIOLOGY | Facility: MEDICAL CENTER | Age: 28
DRG: 252 | End: 2023-01-01
Attending: INTERNAL MEDICINE
Payer: COMMERCIAL

## 2023-01-01 ENCOUNTER — HOSPITAL ENCOUNTER (OUTPATIENT)
Dept: RADIOLOGY | Facility: MEDICAL CENTER | Age: 28
End: 2023-04-01
Attending: INTERNAL MEDICINE

## 2023-01-01 ENCOUNTER — HOSPITAL ENCOUNTER (INPATIENT)
Facility: MEDICAL CENTER | Age: 28
LOS: 14 days | DRG: 252 | End: 2023-06-28
Attending: EMERGENCY MEDICINE | Admitting: INTERNAL MEDICINE
Payer: COMMERCIAL

## 2023-01-01 ENCOUNTER — PHARMACY VISIT (OUTPATIENT)
Dept: PHARMACY | Facility: MEDICAL CENTER | Age: 28
End: 2023-01-01

## 2023-01-01 ENCOUNTER — APPOINTMENT (OUTPATIENT)
Dept: RADIOLOGY | Facility: MEDICAL CENTER | Age: 28
DRG: 981 | End: 2023-01-01
Attending: INTERNAL MEDICINE
Payer: COMMERCIAL

## 2023-01-01 ENCOUNTER — APPOINTMENT (OUTPATIENT)
Dept: ONCOLOGY | Facility: MEDICAL CENTER | Age: 28
End: 2023-01-01
Attending: INTERNAL MEDICINE
Payer: MEDICAID

## 2023-01-01 ENCOUNTER — HOSPITAL ENCOUNTER (EMERGENCY)
Facility: MEDICAL CENTER | Age: 28
End: 2023-06-09
Attending: EMERGENCY MEDICINE
Payer: COMMERCIAL

## 2023-01-01 ENCOUNTER — APPOINTMENT (OUTPATIENT)
Dept: RADIOLOGY | Facility: MEDICAL CENTER | Age: 28
DRG: 981 | End: 2023-01-01
Attending: HOSPITALIST
Payer: COMMERCIAL

## 2023-01-01 ENCOUNTER — HOSPITAL ENCOUNTER (INPATIENT)
Dept: RADIOLOGY | Facility: MEDICAL CENTER | Age: 28
DRG: 252 | End: 2023-06-21
Attending: STUDENT IN AN ORGANIZED HEALTH CARE EDUCATION/TRAINING PROGRAM | Admitting: INTERNAL MEDICINE
Payer: COMMERCIAL

## 2023-01-01 ENCOUNTER — APPOINTMENT (OUTPATIENT)
Dept: RADIOLOGY | Facility: MEDICAL CENTER | Age: 28
DRG: 981 | End: 2023-01-01
Attending: SURGERY
Payer: COMMERCIAL

## 2023-01-01 ENCOUNTER — HOSPITAL ENCOUNTER (INPATIENT)
Facility: MEDICAL CENTER | Age: 28
LOS: 13 days | DRG: 981 | End: 2023-07-13
Attending: EMERGENCY MEDICINE | Admitting: HOSPITALIST
Payer: COMMERCIAL

## 2023-01-01 ENCOUNTER — APPOINTMENT (OUTPATIENT)
Dept: CARDIOLOGY | Facility: MEDICAL CENTER | Age: 28
DRG: 252 | End: 2023-01-01
Attending: INTERNAL MEDICINE
Payer: COMMERCIAL

## 2023-01-01 ENCOUNTER — APPOINTMENT (OUTPATIENT)
Dept: RADIOLOGY | Facility: MEDICAL CENTER | Age: 28
DRG: 252 | End: 2023-01-01
Attending: SURGERY
Payer: COMMERCIAL

## 2023-01-01 ENCOUNTER — APPOINTMENT (OUTPATIENT)
Dept: ONCOLOGY | Facility: MEDICAL CENTER | Age: 28
End: 2023-01-01
Payer: COMMERCIAL

## 2023-01-01 ENCOUNTER — HOSPITAL ENCOUNTER (OUTPATIENT)
Dept: RADIOLOGY | Facility: MEDICAL CENTER | Age: 28
End: 2023-04-22
Attending: INTERNAL MEDICINE

## 2023-01-01 ENCOUNTER — PHARMACY VISIT (OUTPATIENT)
Dept: PHARMACY | Facility: MEDICAL CENTER | Age: 28
End: 2023-01-01
Payer: COMMERCIAL

## 2023-01-01 ENCOUNTER — APPOINTMENT (OUTPATIENT)
Dept: RADIOLOGY | Facility: MEDICAL CENTER | Age: 28
DRG: 252 | End: 2023-01-01
Attending: STUDENT IN AN ORGANIZED HEALTH CARE EDUCATION/TRAINING PROGRAM
Payer: COMMERCIAL

## 2023-01-01 VITALS
SYSTOLIC BLOOD PRESSURE: 135 MMHG | HEART RATE: 95 BPM | RESPIRATION RATE: 15 BRPM | DIASTOLIC BLOOD PRESSURE: 85 MMHG | OXYGEN SATURATION: 95 % | BODY MASS INDEX: 15.98 KG/M2 | HEIGHT: 65 IN | TEMPERATURE: 96.9 F | WEIGHT: 95.9 LBS

## 2023-01-01 VITALS
OXYGEN SATURATION: 100 % | RESPIRATION RATE: 20 BRPM | HEIGHT: 67 IN | WEIGHT: 89.29 LBS | SYSTOLIC BLOOD PRESSURE: 132 MMHG | DIASTOLIC BLOOD PRESSURE: 91 MMHG | HEART RATE: 70 BPM | BODY MASS INDEX: 14.01 KG/M2 | TEMPERATURE: 98 F

## 2023-01-01 VITALS
BODY MASS INDEX: 17.65 KG/M2 | SYSTOLIC BLOOD PRESSURE: 145 MMHG | DIASTOLIC BLOOD PRESSURE: 108 MMHG | WEIGHT: 112.43 LBS | TEMPERATURE: 98 F | OXYGEN SATURATION: 91 % | RESPIRATION RATE: 18 BRPM | HEART RATE: 75 BPM

## 2023-01-01 VITALS
SYSTOLIC BLOOD PRESSURE: 140 MMHG | RESPIRATION RATE: 18 BRPM | DIASTOLIC BLOOD PRESSURE: 99 MMHG | TEMPERATURE: 97 F | OXYGEN SATURATION: 99 % | HEART RATE: 77 BPM

## 2023-01-01 VITALS
RESPIRATION RATE: 16 BRPM | SYSTOLIC BLOOD PRESSURE: 150 MMHG | TEMPERATURE: 97.7 F | OXYGEN SATURATION: 100 % | DIASTOLIC BLOOD PRESSURE: 100 MMHG | HEART RATE: 90 BPM

## 2023-01-01 VITALS
RESPIRATION RATE: 16 BRPM | BODY MASS INDEX: 17.65 KG/M2 | OXYGEN SATURATION: 100 % | HEIGHT: 67 IN | SYSTOLIC BLOOD PRESSURE: 158 MMHG | HEART RATE: 70 BPM | TEMPERATURE: 97.2 F | DIASTOLIC BLOOD PRESSURE: 103 MMHG

## 2023-01-01 VITALS
HEART RATE: 93 BPM | TEMPERATURE: 98 F | RESPIRATION RATE: 18 BRPM | SYSTOLIC BLOOD PRESSURE: 181 MMHG | DIASTOLIC BLOOD PRESSURE: 110 MMHG | OXYGEN SATURATION: 98 %

## 2023-01-01 VITALS
HEART RATE: 126 BPM | DIASTOLIC BLOOD PRESSURE: 83 MMHG | TEMPERATURE: 97.4 F | WEIGHT: 117.95 LBS | SYSTOLIC BLOOD PRESSURE: 106 MMHG | HEIGHT: 65 IN | BODY MASS INDEX: 19.65 KG/M2 | RESPIRATION RATE: 40 BRPM | OXYGEN SATURATION: 91 %

## 2023-01-01 VITALS
HEART RATE: 83 BPM | RESPIRATION RATE: 18 BRPM | HEIGHT: 67 IN | BODY MASS INDEX: 17.65 KG/M2 | SYSTOLIC BLOOD PRESSURE: 153 MMHG | WEIGHT: 112.43 LBS | OXYGEN SATURATION: 97 % | TEMPERATURE: 97.9 F | DIASTOLIC BLOOD PRESSURE: 108 MMHG

## 2023-01-01 VITALS
HEART RATE: 110 BPM | SYSTOLIC BLOOD PRESSURE: 157 MMHG | DIASTOLIC BLOOD PRESSURE: 111 MMHG | TEMPERATURE: 98.1 F | RESPIRATION RATE: 18 BRPM

## 2023-01-01 VITALS
OXYGEN SATURATION: 91 % | RESPIRATION RATE: 16 BRPM | SYSTOLIC BLOOD PRESSURE: 136 MMHG | TEMPERATURE: 98.9 F | DIASTOLIC BLOOD PRESSURE: 99 MMHG | HEART RATE: 111 BPM

## 2023-01-01 VITALS
OXYGEN SATURATION: 94 % | TEMPERATURE: 97.8 F | DIASTOLIC BLOOD PRESSURE: 117 MMHG | RESPIRATION RATE: 18 BRPM | HEART RATE: 83 BPM | SYSTOLIC BLOOD PRESSURE: 197 MMHG

## 2023-01-01 VITALS
SYSTOLIC BLOOD PRESSURE: 143 MMHG | DIASTOLIC BLOOD PRESSURE: 99 MMHG | HEART RATE: 75 BPM | TEMPERATURE: 97.4 F | OXYGEN SATURATION: 94 % | RESPIRATION RATE: 16 BRPM

## 2023-01-01 DIAGNOSIS — E43 SEVERE PROTEIN-CALORIE MALNUTRITION (HCC): ICD-10-CM

## 2023-01-01 DIAGNOSIS — M32.10 SYSTEMIC LUPUS ERYTHEMATOSUS WITH ORGAN SYSTEM INVOLVEMENT (HCC): ICD-10-CM

## 2023-01-01 DIAGNOSIS — M32.9 LUPUS (HCC): ICD-10-CM

## 2023-01-01 DIAGNOSIS — D64.9 SYMPTOMATIC ANEMIA: ICD-10-CM

## 2023-01-01 DIAGNOSIS — T82.898A PROBLEM WITH DIALYSIS ACCESS, INITIAL ENCOUNTER (HCC): ICD-10-CM

## 2023-01-01 DIAGNOSIS — I27.20 PULMONARY HYPERTENSION (HCC): ICD-10-CM

## 2023-01-01 DIAGNOSIS — N18.6 ESRD (END STAGE RENAL DISEASE) (HCC): ICD-10-CM

## 2023-01-01 DIAGNOSIS — M32.14 LUPUS GLOMERULONEPHRITIS (HCC): ICD-10-CM

## 2023-01-01 DIAGNOSIS — D64.9 ANEMIA, UNSPECIFIED TYPE: ICD-10-CM

## 2023-01-01 DIAGNOSIS — D62 ACUTE BLOOD LOSS ANEMIA: ICD-10-CM

## 2023-01-01 DIAGNOSIS — I50.20 HEART FAILURE WITH REDUCED EJECTION FRACTION (HCC): ICD-10-CM

## 2023-01-01 DIAGNOSIS — E16.2 HYPOGLYCEMIA: ICD-10-CM

## 2023-01-01 DIAGNOSIS — F34.1 PERSISTENT DEPRESSIVE DISORDER WITH ANXIOUS DISTRESS, CURRENTLY MILD: ICD-10-CM

## 2023-01-01 DIAGNOSIS — R76.0 RAISED ANTIBODY TITER: ICD-10-CM

## 2023-01-01 DIAGNOSIS — R58 BLEEDING: ICD-10-CM

## 2023-01-01 LAB
1,25(OH)2D3 SERPL-MCNC: 6.3 PG/ML (ref 19.9–79.3)
A-TOCOPHEROL VIT E SERPL-MCNC: 8 MG/L (ref 5.5–18)
ABO GROUP BLD: NORMAL
ACTH PLAS-MCNC: 69.7 PG/ML (ref 7.2–63.3)
ALBUMIN SERPL BCP-MCNC: 1.5 G/DL (ref 3.2–4.9)
ALBUMIN SERPL BCP-MCNC: 1.5 G/DL (ref 3.2–4.9)
ALBUMIN SERPL BCP-MCNC: 1.7 G/DL (ref 3.2–4.9)
ALBUMIN SERPL BCP-MCNC: 1.8 G/DL (ref 3.2–4.9)
ALBUMIN SERPL BCP-MCNC: 1.9 G/DL (ref 3.2–4.9)
ALBUMIN SERPL BCP-MCNC: 1.9 G/DL (ref 3.2–4.9)
ALBUMIN SERPL BCP-MCNC: 2 G/DL (ref 3.2–4.9)
ALBUMIN SERPL BCP-MCNC: 2.1 G/DL (ref 3.2–4.9)
ALBUMIN SERPL BCP-MCNC: 2.2 G/DL (ref 3.2–4.9)
ALBUMIN SERPL BCP-MCNC: 2.3 G/DL (ref 3.2–4.9)
ALBUMIN SERPL BCP-MCNC: 2.4 G/DL (ref 3.2–4.9)
ALBUMIN SERPL BCP-MCNC: 2.5 G/DL (ref 3.2–4.9)
ALBUMIN/GLOB SERPL: 0.3 G/DL
ALBUMIN/GLOB SERPL: 0.4 G/DL
ALP SERPL-CCNC: 104 U/L (ref 30–99)
ALP SERPL-CCNC: 104 U/L (ref 30–99)
ALP SERPL-CCNC: 106 U/L (ref 30–99)
ALP SERPL-CCNC: 111 U/L (ref 30–99)
ALP SERPL-CCNC: 169 U/L (ref 30–99)
ALP SERPL-CCNC: 62 U/L (ref 30–99)
ALP SERPL-CCNC: 67 U/L (ref 30–99)
ALP SERPL-CCNC: 69 U/L (ref 30–99)
ALP SERPL-CCNC: 70 U/L (ref 30–99)
ALP SERPL-CCNC: 70 U/L (ref 30–99)
ALP SERPL-CCNC: 72 U/L (ref 30–99)
ALP SERPL-CCNC: 73 U/L (ref 30–99)
ALP SERPL-CCNC: 74 U/L (ref 30–99)
ALP SERPL-CCNC: 75 U/L (ref 30–99)
ALP SERPL-CCNC: 79 U/L (ref 30–99)
ALP SERPL-CCNC: 81 U/L (ref 30–99)
ALP SERPL-CCNC: 81 U/L (ref 30–99)
ALP SERPL-CCNC: 82 U/L (ref 30–99)
ALP SERPL-CCNC: 89 U/L (ref 30–99)
ALP SERPL-CCNC: 93 U/L (ref 30–99)
ALP SERPL-CCNC: 93 U/L (ref 30–99)
ALT SERPL-CCNC: 5 U/L (ref 2–50)
ALT SERPL-CCNC: 6 U/L (ref 2–50)
ALT SERPL-CCNC: 7 U/L (ref 2–50)
ALT SERPL-CCNC: 7 U/L (ref 2–50)
ALT SERPL-CCNC: <5 U/L (ref 2–50)
ANION GAP SERPL CALC-SCNC: 10 MMOL/L (ref 7–16)
ANION GAP SERPL CALC-SCNC: 11 MMOL/L (ref 7–16)
ANION GAP SERPL CALC-SCNC: 12 MMOL/L (ref 7–16)
ANION GAP SERPL CALC-SCNC: 12 MMOL/L (ref 7–16)
ANION GAP SERPL CALC-SCNC: 13 MMOL/L (ref 7–16)
ANION GAP SERPL CALC-SCNC: 4 MMOL/L (ref 7–16)
ANION GAP SERPL CALC-SCNC: 5 MMOL/L (ref 7–16)
ANION GAP SERPL CALC-SCNC: 6 MMOL/L (ref 7–16)
ANION GAP SERPL CALC-SCNC: 7 MMOL/L (ref 7–16)
ANION GAP SERPL CALC-SCNC: 8 MMOL/L (ref 7–16)
ANION GAP SERPL CALC-SCNC: 9 MMOL/L (ref 7–16)
ANISOCYTOSIS BLD QL SMEAR: ABNORMAL
ANNOTATION COMMENT IMP: ABNORMAL
APTT PPP: 32.6 SEC (ref 24.7–36)
AST SERPL-CCNC: 10 U/L (ref 12–45)
AST SERPL-CCNC: 11 U/L (ref 12–45)
AST SERPL-CCNC: 11 U/L (ref 12–45)
AST SERPL-CCNC: 12 U/L (ref 12–45)
AST SERPL-CCNC: 14 U/L (ref 12–45)
AST SERPL-CCNC: 15 U/L (ref 12–45)
AST SERPL-CCNC: 15 U/L (ref 12–45)
AST SERPL-CCNC: 17 U/L (ref 12–45)
AST SERPL-CCNC: 17 U/L (ref 12–45)
AST SERPL-CCNC: 18 U/L (ref 12–45)
AST SERPL-CCNC: 5 U/L (ref 12–45)
AST SERPL-CCNC: 5 U/L (ref 12–45)
AST SERPL-CCNC: 7 U/L (ref 12–45)
AST SERPL-CCNC: 7 U/L (ref 12–45)
AST SERPL-CCNC: 8 U/L (ref 12–45)
AST SERPL-CCNC: 9 U/L (ref 12–45)
BACTERIA BLD CULT: ABNORMAL
BACTERIA BLD CULT: NORMAL
BACTERIA BLD CULT: NORMAL
BARCODED ABORH UBTYP: 5100
BARCODED PRD CODE UBPRD: NORMAL
BARCODED UNIT NUM UBUNT: NORMAL
BASOPHILS # BLD AUTO: 0 % (ref 0–1.8)
BASOPHILS # BLD AUTO: 0 % (ref 0–1.8)
BASOPHILS # BLD AUTO: 0.2 % (ref 0–1.8)
BASOPHILS # BLD AUTO: 0.5 % (ref 0–1.8)
BASOPHILS # BLD AUTO: 0.5 % (ref 0–1.8)
BASOPHILS # BLD AUTO: 0.6 % (ref 0–1.8)
BASOPHILS # BLD AUTO: 0.7 % (ref 0–1.8)
BASOPHILS # BLD AUTO: 0.7 % (ref 0–1.8)
BASOPHILS # BLD AUTO: 0.8 % (ref 0–1.8)
BASOPHILS # BLD AUTO: 0.9 % (ref 0–1.8)
BASOPHILS # BLD AUTO: 1 % (ref 0–1.8)
BASOPHILS # BLD AUTO: 1.3 % (ref 0–1.8)
BASOPHILS # BLD AUTO: 1.4 % (ref 0–1.8)
BASOPHILS # BLD AUTO: 2 % (ref 0–1.8)
BASOPHILS # BLD AUTO: 5.2 % (ref 0–1.8)
BASOPHILS # BLD: 0 K/UL (ref 0–0.12)
BASOPHILS # BLD: 0 K/UL (ref 0–0.12)
BASOPHILS # BLD: 0.01 K/UL (ref 0–0.12)
BASOPHILS # BLD: 0.02 K/UL (ref 0–0.12)
BASOPHILS # BLD: 0.03 K/UL (ref 0–0.12)
BASOPHILS # BLD: 0.04 K/UL (ref 0–0.12)
BASOPHILS # BLD: 0.04 K/UL (ref 0–0.12)
BASOPHILS # BLD: 0.05 K/UL (ref 0–0.12)
BASOPHILS # BLD: 0.06 K/UL (ref 0–0.12)
BASOPHILS # BLD: 0.12 K/UL (ref 0–0.12)
BASOPHILS # BLD: 0.14 K/UL (ref 0–0.12)
BETA+GAMMA TOCOPHEROL SERPL-MCNC: 0.9 MG/L (ref 0–6)
BILIRUB SERPL-MCNC: 0.4 MG/DL (ref 0.1–1.5)
BILIRUB SERPL-MCNC: 0.5 MG/DL (ref 0.1–1.5)
BILIRUB SERPL-MCNC: 0.6 MG/DL (ref 0.1–1.5)
BILIRUB SERPL-MCNC: 0.7 MG/DL (ref 0.1–1.5)
BILIRUB SERPL-MCNC: 0.8 MG/DL (ref 0.1–1.5)
BILIRUB SERPL-MCNC: 0.9 MG/DL (ref 0.1–1.5)
BILIRUB SERPL-MCNC: 1 MG/DL (ref 0.1–1.5)
BLD GP AB SCN SERPL QL: NORMAL
BUN SERPL-MCNC: 10 MG/DL (ref 8–22)
BUN SERPL-MCNC: 11 MG/DL (ref 8–22)
BUN SERPL-MCNC: 12 MG/DL (ref 8–22)
BUN SERPL-MCNC: 13 MG/DL (ref 8–22)
BUN SERPL-MCNC: 13 MG/DL (ref 8–22)
BUN SERPL-MCNC: 14 MG/DL (ref 8–22)
BUN SERPL-MCNC: 15 MG/DL (ref 8–22)
BUN SERPL-MCNC: 17 MG/DL (ref 8–22)
BUN SERPL-MCNC: 17 MG/DL (ref 8–22)
BUN SERPL-MCNC: 18 MG/DL (ref 8–22)
BUN SERPL-MCNC: 19 MG/DL (ref 8–22)
BUN SERPL-MCNC: 20 MG/DL (ref 8–22)
BUN SERPL-MCNC: 20 MG/DL (ref 8–22)
BUN SERPL-MCNC: 21 MG/DL (ref 8–22)
BUN SERPL-MCNC: 21 MG/DL (ref 8–22)
BUN SERPL-MCNC: 22 MG/DL (ref 8–22)
BUN SERPL-MCNC: 24 MG/DL (ref 8–22)
BUN SERPL-MCNC: 24 MG/DL (ref 8–22)
BUN SERPL-MCNC: 25 MG/DL (ref 8–22)
BUN SERPL-MCNC: 26 MG/DL (ref 8–22)
BUN SERPL-MCNC: 26 MG/DL (ref 8–22)
BUN SERPL-MCNC: 27 MG/DL (ref 8–22)
BUN SERPL-MCNC: 29 MG/DL (ref 8–22)
BUN SERPL-MCNC: 29 MG/DL (ref 8–22)
BUN SERPL-MCNC: 31 MG/DL (ref 8–22)
BUN SERPL-MCNC: 32 MG/DL (ref 8–22)
BUN SERPL-MCNC: 33 MG/DL (ref 8–22)
BUN SERPL-MCNC: 35 MG/DL (ref 8–22)
BUN SERPL-MCNC: 38 MG/DL (ref 8–22)
BUN SERPL-MCNC: 38 MG/DL (ref 8–22)
BUN SERPL-MCNC: 49 MG/DL (ref 8–22)
BUN SERPL-MCNC: 8 MG/DL (ref 8–22)
BUN SERPL-MCNC: 8 MG/DL (ref 8–22)
BUN SERPL-MCNC: 9 MG/DL (ref 8–22)
BURR CELLS BLD QL SMEAR: NORMAL
BURR CELLS BLD QL SMEAR: NORMAL
C PEPTIDE SERPL-MCNC: 6.8 NG/ML (ref 0.5–3.3)
C3 SERPL-MCNC: 36.9 MG/DL (ref 87–200)
C4 SERPL-MCNC: 3.4 MG/DL (ref 19–52)
CA-I SERPL-SCNC: 1.1 MMOL/L (ref 1.1–1.3)
CALCIUM ALBUM COR SERPL-MCNC: 10 MG/DL (ref 8.5–10.5)
CALCIUM ALBUM COR SERPL-MCNC: 10 MG/DL (ref 8.5–10.5)
CALCIUM ALBUM COR SERPL-MCNC: 10.1 MG/DL (ref 8.5–10.5)
CALCIUM ALBUM COR SERPL-MCNC: 10.2 MG/DL (ref 8.5–10.5)
CALCIUM ALBUM COR SERPL-MCNC: 9.1 MG/DL (ref 8.5–10.5)
CALCIUM ALBUM COR SERPL-MCNC: 9.2 MG/DL (ref 8.5–10.5)
CALCIUM ALBUM COR SERPL-MCNC: 9.2 MG/DL (ref 8.5–10.5)
CALCIUM ALBUM COR SERPL-MCNC: 9.3 MG/DL (ref 8.5–10.5)
CALCIUM ALBUM COR SERPL-MCNC: 9.3 MG/DL (ref 8.5–10.5)
CALCIUM ALBUM COR SERPL-MCNC: 9.4 MG/DL (ref 8.5–10.5)
CALCIUM ALBUM COR SERPL-MCNC: 9.5 MG/DL (ref 8.5–10.5)
CALCIUM ALBUM COR SERPL-MCNC: 9.5 MG/DL (ref 8.5–10.5)
CALCIUM ALBUM COR SERPL-MCNC: 9.6 MG/DL (ref 8.5–10.5)
CALCIUM ALBUM COR SERPL-MCNC: 9.6 MG/DL (ref 8.5–10.5)
CALCIUM ALBUM COR SERPL-MCNC: 9.7 MG/DL (ref 8.5–10.5)
CALCIUM ALBUM COR SERPL-MCNC: 9.8 MG/DL (ref 8.5–10.5)
CALCIUM ALBUM COR SERPL-MCNC: 9.9 MG/DL (ref 8.5–10.5)
CALCIUM SERPL-MCNC: 7 MG/DL (ref 8.5–10.5)
CALCIUM SERPL-MCNC: 7.1 MG/DL (ref 8.5–10.5)
CALCIUM SERPL-MCNC: 7.2 MG/DL (ref 8.5–10.5)
CALCIUM SERPL-MCNC: 7.3 MG/DL (ref 8.5–10.5)
CALCIUM SERPL-MCNC: 7.4 MG/DL (ref 8.5–10.5)
CALCIUM SERPL-MCNC: 7.5 MG/DL (ref 8.5–10.5)
CALCIUM SERPL-MCNC: 7.6 MG/DL (ref 8.5–10.5)
CALCIUM SERPL-MCNC: 7.7 MG/DL (ref 8.5–10.5)
CALCIUM SERPL-MCNC: 7.8 MG/DL (ref 8.5–10.5)
CALCIUM SERPL-MCNC: 7.9 MG/DL (ref 8.5–10.5)
CALCIUM SERPL-MCNC: 8 MG/DL (ref 8.5–10.5)
CALCIUM SERPL-MCNC: 8.1 MG/DL (ref 8.5–10.5)
CALCIUM SERPL-MCNC: 8.2 MG/DL (ref 8.5–10.5)
CALCIUM SERPL-MCNC: 8.3 MG/DL (ref 8.5–10.5)
CALCIUM SERPL-MCNC: 8.5 MG/DL (ref 8.5–10.5)
CALCIUM SERPL-MCNC: 8.6 MG/DL (ref 8.5–10.5)
CALCIUM SERPL-MCNC: 8.7 MG/DL (ref 8.5–10.5)
CALCIUM SERPL-MCNC: 8.8 MG/DL (ref 8.5–10.5)
CFT BLD TEG: 4.5 MIN (ref 4.6–9.1)
CFT BLD TEG: 6.2 MIN (ref 4.6–9.1)
CFT P HPASE BLD TEG: 4 MIN (ref 4.3–8.3)
CFT P HPASE BLD TEG: 6.4 MIN (ref 4.3–8.3)
CHLORIDE SERPL-SCNC: 100 MMOL/L (ref 96–112)
CHLORIDE SERPL-SCNC: 83 MMOL/L (ref 96–112)
CHLORIDE SERPL-SCNC: 83 MMOL/L (ref 96–112)
CHLORIDE SERPL-SCNC: 85 MMOL/L (ref 96–112)
CHLORIDE SERPL-SCNC: 86 MMOL/L (ref 96–112)
CHLORIDE SERPL-SCNC: 87 MMOL/L (ref 96–112)
CHLORIDE SERPL-SCNC: 89 MMOL/L (ref 96–112)
CHLORIDE SERPL-SCNC: 89 MMOL/L (ref 96–112)
CHLORIDE SERPL-SCNC: 90 MMOL/L (ref 96–112)
CHLORIDE SERPL-SCNC: 91 MMOL/L (ref 96–112)
CHLORIDE SERPL-SCNC: 92 MMOL/L (ref 96–112)
CHLORIDE SERPL-SCNC: 93 MMOL/L (ref 96–112)
CHLORIDE SERPL-SCNC: 94 MMOL/L (ref 96–112)
CHLORIDE SERPL-SCNC: 94 MMOL/L (ref 96–112)
CHLORIDE SERPL-SCNC: 95 MMOL/L (ref 96–112)
CHLORIDE SERPL-SCNC: 96 MMOL/L (ref 96–112)
CHLORIDE SERPL-SCNC: 96 MMOL/L (ref 96–112)
CHLORIDE SERPL-SCNC: 97 MMOL/L (ref 96–112)
CHLORIDE SERPL-SCNC: 98 MMOL/L (ref 96–112)
CHLORIDE SERPL-SCNC: 99 MMOL/L (ref 96–112)
CHOLEST SERPL-MCNC: 86 MG/DL (ref 100–199)
CLOT ANGLE BLD TEG: 73.5 DEGREES (ref 63–78)
CLOT ANGLE BLD TEG: 77.9 DEGREES (ref 63–78)
CLOT LYSIS 30M P MA LENFR BLD TEG: 0 % (ref 0–2.6)
CO2 SERPL-SCNC: 19 MMOL/L (ref 20–33)
CO2 SERPL-SCNC: 21 MMOL/L (ref 20–33)
CO2 SERPL-SCNC: 22 MMOL/L (ref 20–33)
CO2 SERPL-SCNC: 23 MMOL/L (ref 20–33)
CO2 SERPL-SCNC: 23 MMOL/L (ref 20–33)
CO2 SERPL-SCNC: 24 MMOL/L (ref 20–33)
CO2 SERPL-SCNC: 24 MMOL/L (ref 20–33)
CO2 SERPL-SCNC: 25 MMOL/L (ref 20–33)
CO2 SERPL-SCNC: 26 MMOL/L (ref 20–33)
CO2 SERPL-SCNC: 27 MMOL/L (ref 20–33)
CO2 SERPL-SCNC: 28 MMOL/L (ref 20–33)
CO2 SERPL-SCNC: 28 MMOL/L (ref 20–33)
CO2 SERPL-SCNC: 29 MMOL/L (ref 20–33)
CO2 SERPL-SCNC: 30 MMOL/L (ref 20–33)
CO2 SERPL-SCNC: 31 MMOL/L (ref 20–33)
CO2 SERPL-SCNC: 32 MMOL/L (ref 20–33)
CO2 SERPL-SCNC: 34 MMOL/L (ref 20–33)
CO2 SERPL-SCNC: 38 MMOL/L (ref 20–33)
COMMENT 1642: NORMAL
COMPONENT R 8504R: NORMAL
COPPER SERPL-MCNC: 27.5 UG/DL (ref 80–155)
CORTIS SERPL-MCNC: 10.5 UG/DL (ref 0–23)
CORTIS SERPL-MCNC: 14.3 UG/DL (ref 0–23)
CORTIS SERPL-MCNC: 17.6 UG/DL (ref 0–23)
CREAT SERPL-MCNC: 1.59 MG/DL (ref 0.5–1.4)
CREAT SERPL-MCNC: 1.7 MG/DL (ref 0.5–1.4)
CREAT SERPL-MCNC: 1.75 MG/DL (ref 0.5–1.4)
CREAT SERPL-MCNC: 1.83 MG/DL (ref 0.5–1.4)
CREAT SERPL-MCNC: 1.84 MG/DL (ref 0.5–1.4)
CREAT SERPL-MCNC: 2.11 MG/DL (ref 0.5–1.4)
CREAT SERPL-MCNC: 2.14 MG/DL (ref 0.5–1.4)
CREAT SERPL-MCNC: 2.21 MG/DL (ref 0.5–1.4)
CREAT SERPL-MCNC: 2.3 MG/DL (ref 0.5–1.4)
CREAT SERPL-MCNC: 2.35 MG/DL (ref 0.5–1.4)
CREAT SERPL-MCNC: 2.44 MG/DL (ref 0.5–1.4)
CREAT SERPL-MCNC: 2.47 MG/DL (ref 0.5–1.4)
CREAT SERPL-MCNC: 2.48 MG/DL (ref 0.5–1.4)
CREAT SERPL-MCNC: 2.52 MG/DL (ref 0.5–1.4)
CREAT SERPL-MCNC: 2.59 MG/DL (ref 0.5–1.4)
CREAT SERPL-MCNC: 2.62 MG/DL (ref 0.5–1.4)
CREAT SERPL-MCNC: 2.65 MG/DL (ref 0.5–1.4)
CREAT SERPL-MCNC: 2.65 MG/DL (ref 0.5–1.4)
CREAT SERPL-MCNC: 2.68 MG/DL (ref 0.5–1.4)
CREAT SERPL-MCNC: 2.79 MG/DL (ref 0.5–1.4)
CREAT SERPL-MCNC: 2.81 MG/DL (ref 0.5–1.4)
CREAT SERPL-MCNC: 2.9 MG/DL (ref 0.5–1.4)
CREAT SERPL-MCNC: 2.92 MG/DL (ref 0.5–1.4)
CREAT SERPL-MCNC: 2.96 MG/DL (ref 0.5–1.4)
CREAT SERPL-MCNC: 2.97 MG/DL (ref 0.5–1.4)
CREAT SERPL-MCNC: 3.07 MG/DL (ref 0.5–1.4)
CREAT SERPL-MCNC: 3.2 MG/DL (ref 0.5–1.4)
CREAT SERPL-MCNC: 3.24 MG/DL (ref 0.5–1.4)
CREAT SERPL-MCNC: 3.24 MG/DL (ref 0.5–1.4)
CREAT SERPL-MCNC: 3.31 MG/DL (ref 0.5–1.4)
CREAT SERPL-MCNC: 3.36 MG/DL (ref 0.5–1.4)
CREAT SERPL-MCNC: 3.41 MG/DL (ref 0.5–1.4)
CREAT SERPL-MCNC: 3.43 MG/DL (ref 0.5–1.4)
CREAT SERPL-MCNC: 3.52 MG/DL (ref 0.5–1.4)
CREAT SERPL-MCNC: 3.64 MG/DL (ref 0.5–1.4)
CREAT SERPL-MCNC: 3.96 MG/DL (ref 0.5–1.4)
CREAT SERPL-MCNC: 4.29 MG/DL (ref 0.5–1.4)
CREAT SERPL-MCNC: 4.3 MG/DL (ref 0.5–1.4)
CREAT SERPL-MCNC: 4.5 MG/DL (ref 0.5–1.4)
CREAT SERPL-MCNC: 5.07 MG/DL (ref 0.5–1.4)
CRP SERPL HS-MCNC: 1.1 MG/DL (ref 0–0.75)
CRP SERPL HS-MCNC: 11.93 MG/DL (ref 0–0.75)
CRP SERPL HS-MCNC: 2.85 MG/DL (ref 0–0.75)
CT.EXTRINSIC BLD ROTEM: 0.9 MIN (ref 0.8–2.1)
CT.EXTRINSIC BLD ROTEM: 1.3 MIN (ref 0.8–2.1)
DACRYOCYTES BLD QL SMEAR: NORMAL
DSDNA AB TITR SER CLIF: NORMAL {TITER}
DSDNA IGG TITR SER CLIF: ABNORMAL {TITER}
EKG IMPRESSION: NORMAL
EOSINOPHIL # BLD AUTO: 0 K/UL (ref 0–0.51)
EOSINOPHIL # BLD AUTO: 0.01 K/UL (ref 0–0.51)
EOSINOPHIL # BLD AUTO: 0.02 K/UL (ref 0–0.51)
EOSINOPHIL # BLD AUTO: 0.03 K/UL (ref 0–0.51)
EOSINOPHIL # BLD AUTO: 0.05 K/UL (ref 0–0.51)
EOSINOPHIL # BLD AUTO: 0.06 K/UL (ref 0–0.51)
EOSINOPHIL # BLD AUTO: 0.07 K/UL (ref 0–0.51)
EOSINOPHIL # BLD AUTO: 0.08 K/UL (ref 0–0.51)
EOSINOPHIL NFR BLD: 0 % (ref 0–6.9)
EOSINOPHIL NFR BLD: 0.2 % (ref 0–6.9)
EOSINOPHIL NFR BLD: 0.3 % (ref 0–6.9)
EOSINOPHIL NFR BLD: 0.4 % (ref 0–6.9)
EOSINOPHIL NFR BLD: 0.5 % (ref 0–6.9)
EOSINOPHIL NFR BLD: 0.6 % (ref 0–6.9)
EOSINOPHIL NFR BLD: 0.7 % (ref 0–6.9)
EOSINOPHIL NFR BLD: 0.8 % (ref 0–6.9)
EOSINOPHIL NFR BLD: 0.9 % (ref 0–6.9)
EOSINOPHIL NFR BLD: 1 % (ref 0–6.9)
EOSINOPHIL NFR BLD: 1 % (ref 0–6.9)
EOSINOPHIL NFR BLD: 1.3 % (ref 0–6.9)
EOSINOPHIL NFR BLD: 1.9 % (ref 0–6.9)
EOSINOPHIL NFR BLD: 1.9 % (ref 0–6.9)
ERYTHROCYTE [DISTWIDTH] IN BLOOD BY AUTOMATED COUNT: 55.1 FL (ref 35.9–50)
ERYTHROCYTE [DISTWIDTH] IN BLOOD BY AUTOMATED COUNT: 55.2 FL (ref 35.9–50)
ERYTHROCYTE [DISTWIDTH] IN BLOOD BY AUTOMATED COUNT: 55.2 FL (ref 35.9–50)
ERYTHROCYTE [DISTWIDTH] IN BLOOD BY AUTOMATED COUNT: 55.6 FL (ref 35.9–50)
ERYTHROCYTE [DISTWIDTH] IN BLOOD BY AUTOMATED COUNT: 57.3 FL (ref 35.9–50)
ERYTHROCYTE [DISTWIDTH] IN BLOOD BY AUTOMATED COUNT: 58.2 FL (ref 35.9–50)
ERYTHROCYTE [DISTWIDTH] IN BLOOD BY AUTOMATED COUNT: 58.6 FL (ref 35.9–50)
ERYTHROCYTE [DISTWIDTH] IN BLOOD BY AUTOMATED COUNT: 60.9 FL (ref 35.9–50)
ERYTHROCYTE [DISTWIDTH] IN BLOOD BY AUTOMATED COUNT: 61.1 FL (ref 35.9–50)
ERYTHROCYTE [DISTWIDTH] IN BLOOD BY AUTOMATED COUNT: 61.5 FL (ref 35.9–50)
ERYTHROCYTE [DISTWIDTH] IN BLOOD BY AUTOMATED COUNT: 63.1 FL (ref 35.9–50)
ERYTHROCYTE [DISTWIDTH] IN BLOOD BY AUTOMATED COUNT: 64.4 FL (ref 35.9–50)
ERYTHROCYTE [DISTWIDTH] IN BLOOD BY AUTOMATED COUNT: 65.1 FL (ref 35.9–50)
ERYTHROCYTE [DISTWIDTH] IN BLOOD BY AUTOMATED COUNT: 65.4 FL (ref 35.9–50)
ERYTHROCYTE [DISTWIDTH] IN BLOOD BY AUTOMATED COUNT: 65.4 FL (ref 35.9–50)
ERYTHROCYTE [DISTWIDTH] IN BLOOD BY AUTOMATED COUNT: 65.5 FL (ref 35.9–50)
ERYTHROCYTE [DISTWIDTH] IN BLOOD BY AUTOMATED COUNT: 66.3 FL (ref 35.9–50)
ERYTHROCYTE [DISTWIDTH] IN BLOOD BY AUTOMATED COUNT: 66.4 FL (ref 35.9–50)
ERYTHROCYTE [DISTWIDTH] IN BLOOD BY AUTOMATED COUNT: 66.4 FL (ref 35.9–50)
ERYTHROCYTE [DISTWIDTH] IN BLOOD BY AUTOMATED COUNT: 66.5 FL (ref 35.9–50)
ERYTHROCYTE [DISTWIDTH] IN BLOOD BY AUTOMATED COUNT: 67.1 FL (ref 35.9–50)
ERYTHROCYTE [DISTWIDTH] IN BLOOD BY AUTOMATED COUNT: 67.8 FL (ref 35.9–50)
ERYTHROCYTE [DISTWIDTH] IN BLOOD BY AUTOMATED COUNT: 68.4 FL (ref 35.9–50)
ERYTHROCYTE [DISTWIDTH] IN BLOOD BY AUTOMATED COUNT: 70.6 FL (ref 35.9–50)
ERYTHROCYTE [DISTWIDTH] IN BLOOD BY AUTOMATED COUNT: 71.2 FL (ref 35.9–50)
ERYTHROCYTE [DISTWIDTH] IN BLOOD BY AUTOMATED COUNT: 71.6 FL (ref 35.9–50)
ERYTHROCYTE [DISTWIDTH] IN BLOOD BY AUTOMATED COUNT: 71.7 FL (ref 35.9–50)
ERYTHROCYTE [DISTWIDTH] IN BLOOD BY AUTOMATED COUNT: 71.8 FL (ref 35.9–50)
ERYTHROCYTE [DISTWIDTH] IN BLOOD BY AUTOMATED COUNT: 72.3 FL (ref 35.9–50)
ERYTHROCYTE [DISTWIDTH] IN BLOOD BY AUTOMATED COUNT: 73 FL (ref 35.9–50)
ERYTHROCYTE [DISTWIDTH] IN BLOOD BY AUTOMATED COUNT: 73.5 FL (ref 35.9–50)
ERYTHROCYTE [DISTWIDTH] IN BLOOD BY AUTOMATED COUNT: 74.2 FL (ref 35.9–50)
ERYTHROCYTE [DISTWIDTH] IN BLOOD BY AUTOMATED COUNT: 75.2 FL (ref 35.9–50)
ERYTHROCYTE [DISTWIDTH] IN BLOOD BY AUTOMATED COUNT: 76.3 FL (ref 35.9–50)
ERYTHROCYTE [SEDIMENTATION RATE] IN BLOOD BY WESTERGREN METHOD: 50 MM/HOUR (ref 0–25)
FERRITIN SERPL-MCNC: 1072 NG/ML (ref 10–291)
FOLATE SERPL-MCNC: 2.1 NG/ML
GFR SERPLBLD CREATININE-BSD FMLA CKD-EPI: 11 ML/MIN/1.73 M 2
GFR SERPLBLD CREATININE-BSD FMLA CKD-EPI: 13 ML/MIN/1.73 M 2
GFR SERPLBLD CREATININE-BSD FMLA CKD-EPI: 14 ML/MIN/1.73 M 2
GFR SERPLBLD CREATININE-BSD FMLA CKD-EPI: 14 ML/MIN/1.73 M 2
GFR SERPLBLD CREATININE-BSD FMLA CKD-EPI: 15 ML/MIN/1.73 M 2
GFR SERPLBLD CREATININE-BSD FMLA CKD-EPI: 17 ML/MIN/1.73 M 2
GFR SERPLBLD CREATININE-BSD FMLA CKD-EPI: 18 ML/MIN/1.73 M 2
GFR SERPLBLD CREATININE-BSD FMLA CKD-EPI: 19 ML/MIN/1.73 M 2
GFR SERPLBLD CREATININE-BSD FMLA CKD-EPI: 20 ML/MIN/1.73 M 2
GFR SERPLBLD CREATININE-BSD FMLA CKD-EPI: 21 ML/MIN/1.73 M 2
GFR SERPLBLD CREATININE-BSD FMLA CKD-EPI: 22 ML/MIN/1.73 M 2
GFR SERPLBLD CREATININE-BSD FMLA CKD-EPI: 23 ML/MIN/1.73 M 2
GFR SERPLBLD CREATININE-BSD FMLA CKD-EPI: 23 ML/MIN/1.73 M 2
GFR SERPLBLD CREATININE-BSD FMLA CKD-EPI: 24 ML/MIN/1.73 M 2
GFR SERPLBLD CREATININE-BSD FMLA CKD-EPI: 25 ML/MIN/1.73 M 2
GFR SERPLBLD CREATININE-BSD FMLA CKD-EPI: 26 ML/MIN/1.73 M 2
GFR SERPLBLD CREATININE-BSD FMLA CKD-EPI: 27 ML/MIN/1.73 M 2
GFR SERPLBLD CREATININE-BSD FMLA CKD-EPI: 29 ML/MIN/1.73 M 2
GFR SERPLBLD CREATININE-BSD FMLA CKD-EPI: 29 ML/MIN/1.73 M 2
GFR SERPLBLD CREATININE-BSD FMLA CKD-EPI: 31 ML/MIN/1.73 M 2
GFR SERPLBLD CREATININE-BSD FMLA CKD-EPI: 32 ML/MIN/1.73 M 2
GFR SERPLBLD CREATININE-BSD FMLA CKD-EPI: 33 ML/MIN/1.73 M 2
GFR SERPLBLD CREATININE-BSD FMLA CKD-EPI: 38 ML/MIN/1.73 M 2
GFR SERPLBLD CREATININE-BSD FMLA CKD-EPI: 39 ML/MIN/1.73 M 2
GFR SERPLBLD CREATININE-BSD FMLA CKD-EPI: 41 ML/MIN/1.73 M 2
GFR SERPLBLD CREATININE-BSD FMLA CKD-EPI: 42 ML/MIN/1.73 M 2
GFR SERPLBLD CREATININE-BSD FMLA CKD-EPI: 46 ML/MIN/1.73 M 2
GLOBULIN SER CALC-MCNC: 4.9 G/DL (ref 1.9–3.5)
GLOBULIN SER CALC-MCNC: 5.2 G/DL (ref 1.9–3.5)
GLOBULIN SER CALC-MCNC: 5.2 G/DL (ref 1.9–3.5)
GLOBULIN SER CALC-MCNC: 5.4 G/DL (ref 1.9–3.5)
GLOBULIN SER CALC-MCNC: 5.7 G/DL (ref 1.9–3.5)
GLOBULIN SER CALC-MCNC: 5.9 G/DL (ref 1.9–3.5)
GLOBULIN SER CALC-MCNC: 6 G/DL (ref 1.9–3.5)
GLOBULIN SER CALC-MCNC: 6.1 G/DL (ref 1.9–3.5)
GLOBULIN SER CALC-MCNC: 6.1 G/DL (ref 1.9–3.5)
GLOBULIN SER CALC-MCNC: 6.2 G/DL (ref 1.9–3.5)
GLOBULIN SER CALC-MCNC: 6.3 G/DL (ref 1.9–3.5)
GLOBULIN SER CALC-MCNC: 6.3 G/DL (ref 1.9–3.5)
GLOBULIN SER CALC-MCNC: 6.5 G/DL (ref 1.9–3.5)
GLOBULIN SER CALC-MCNC: 6.5 G/DL (ref 1.9–3.5)
GLOBULIN SER CALC-MCNC: 6.9 G/DL (ref 1.9–3.5)
GLOBULIN SER CALC-MCNC: 7.3 G/DL (ref 1.9–3.5)
GLUCOSE BLD STRIP.AUTO-MCNC: 101 MG/DL (ref 65–99)
GLUCOSE BLD STRIP.AUTO-MCNC: 103 MG/DL (ref 65–99)
GLUCOSE BLD STRIP.AUTO-MCNC: 104 MG/DL (ref 65–99)
GLUCOSE BLD STRIP.AUTO-MCNC: 107 MG/DL (ref 65–99)
GLUCOSE BLD STRIP.AUTO-MCNC: 108 MG/DL (ref 65–99)
GLUCOSE BLD STRIP.AUTO-MCNC: 109 MG/DL (ref 65–99)
GLUCOSE BLD STRIP.AUTO-MCNC: 111 MG/DL (ref 65–99)
GLUCOSE BLD STRIP.AUTO-MCNC: 112 MG/DL (ref 65–99)
GLUCOSE BLD STRIP.AUTO-MCNC: 113 MG/DL (ref 65–99)
GLUCOSE BLD STRIP.AUTO-MCNC: 118 MG/DL (ref 65–99)
GLUCOSE BLD STRIP.AUTO-MCNC: 120 MG/DL (ref 65–99)
GLUCOSE BLD STRIP.AUTO-MCNC: 121 MG/DL (ref 65–99)
GLUCOSE BLD STRIP.AUTO-MCNC: 124 MG/DL (ref 65–99)
GLUCOSE BLD STRIP.AUTO-MCNC: 126 MG/DL (ref 65–99)
GLUCOSE BLD STRIP.AUTO-MCNC: 133 MG/DL (ref 65–99)
GLUCOSE BLD STRIP.AUTO-MCNC: 135 MG/DL (ref 65–99)
GLUCOSE BLD STRIP.AUTO-MCNC: 135 MG/DL (ref 65–99)
GLUCOSE BLD STRIP.AUTO-MCNC: 136 MG/DL (ref 65–99)
GLUCOSE BLD STRIP.AUTO-MCNC: 137 MG/DL (ref 65–99)
GLUCOSE BLD STRIP.AUTO-MCNC: 138 MG/DL (ref 65–99)
GLUCOSE BLD STRIP.AUTO-MCNC: 140 MG/DL (ref 65–99)
GLUCOSE BLD STRIP.AUTO-MCNC: 145 MG/DL (ref 65–99)
GLUCOSE BLD STRIP.AUTO-MCNC: 149 MG/DL (ref 65–99)
GLUCOSE BLD STRIP.AUTO-MCNC: 150 MG/DL (ref 65–99)
GLUCOSE BLD STRIP.AUTO-MCNC: 156 MG/DL (ref 65–99)
GLUCOSE BLD STRIP.AUTO-MCNC: 157 MG/DL (ref 65–99)
GLUCOSE BLD STRIP.AUTO-MCNC: 157 MG/DL (ref 65–99)
GLUCOSE BLD STRIP.AUTO-MCNC: 161 MG/DL (ref 65–99)
GLUCOSE BLD STRIP.AUTO-MCNC: 164 MG/DL (ref 65–99)
GLUCOSE BLD STRIP.AUTO-MCNC: 22 MG/DL (ref 65–99)
GLUCOSE BLD STRIP.AUTO-MCNC: 22 MG/DL (ref 65–99)
GLUCOSE BLD STRIP.AUTO-MCNC: 253 MG/DL (ref 65–99)
GLUCOSE BLD STRIP.AUTO-MCNC: 268 MG/DL (ref 65–99)
GLUCOSE BLD STRIP.AUTO-MCNC: 28 MG/DL (ref 65–99)
GLUCOSE BLD STRIP.AUTO-MCNC: 30 MG/DL (ref 65–99)
GLUCOSE BLD STRIP.AUTO-MCNC: 32 MG/DL (ref 65–99)
GLUCOSE BLD STRIP.AUTO-MCNC: 33 MG/DL (ref 65–99)
GLUCOSE BLD STRIP.AUTO-MCNC: 36 MG/DL (ref 65–99)
GLUCOSE BLD STRIP.AUTO-MCNC: 37 MG/DL (ref 65–99)
GLUCOSE BLD STRIP.AUTO-MCNC: 40 MG/DL (ref 65–99)
GLUCOSE BLD STRIP.AUTO-MCNC: 42 MG/DL (ref 65–99)
GLUCOSE BLD STRIP.AUTO-MCNC: 46 MG/DL (ref 65–99)
GLUCOSE BLD STRIP.AUTO-MCNC: 47 MG/DL (ref 65–99)
GLUCOSE BLD STRIP.AUTO-MCNC: 50 MG/DL (ref 65–99)
GLUCOSE BLD STRIP.AUTO-MCNC: 51 MG/DL (ref 65–99)
GLUCOSE BLD STRIP.AUTO-MCNC: 51 MG/DL (ref 65–99)
GLUCOSE BLD STRIP.AUTO-MCNC: 56 MG/DL (ref 65–99)
GLUCOSE BLD STRIP.AUTO-MCNC: 56 MG/DL (ref 65–99)
GLUCOSE BLD STRIP.AUTO-MCNC: 58 MG/DL (ref 65–99)
GLUCOSE BLD STRIP.AUTO-MCNC: 58 MG/DL (ref 65–99)
GLUCOSE BLD STRIP.AUTO-MCNC: 59 MG/DL (ref 65–99)
GLUCOSE BLD STRIP.AUTO-MCNC: 60 MG/DL (ref 65–99)
GLUCOSE BLD STRIP.AUTO-MCNC: 60 MG/DL (ref 65–99)
GLUCOSE BLD STRIP.AUTO-MCNC: 62 MG/DL (ref 65–99)
GLUCOSE BLD STRIP.AUTO-MCNC: 62 MG/DL (ref 65–99)
GLUCOSE BLD STRIP.AUTO-MCNC: 63 MG/DL (ref 65–99)
GLUCOSE BLD STRIP.AUTO-MCNC: 64 MG/DL (ref 65–99)
GLUCOSE BLD STRIP.AUTO-MCNC: 65 MG/DL (ref 65–99)
GLUCOSE BLD STRIP.AUTO-MCNC: 66 MG/DL (ref 65–99)
GLUCOSE BLD STRIP.AUTO-MCNC: 67 MG/DL (ref 65–99)
GLUCOSE BLD STRIP.AUTO-MCNC: 68 MG/DL (ref 65–99)
GLUCOSE BLD STRIP.AUTO-MCNC: 69 MG/DL (ref 65–99)
GLUCOSE BLD STRIP.AUTO-MCNC: 70 MG/DL (ref 65–99)
GLUCOSE BLD STRIP.AUTO-MCNC: 70 MG/DL (ref 65–99)
GLUCOSE BLD STRIP.AUTO-MCNC: 71 MG/DL (ref 65–99)
GLUCOSE BLD STRIP.AUTO-MCNC: 73 MG/DL (ref 65–99)
GLUCOSE BLD STRIP.AUTO-MCNC: 74 MG/DL (ref 65–99)
GLUCOSE BLD STRIP.AUTO-MCNC: 75 MG/DL (ref 65–99)
GLUCOSE BLD STRIP.AUTO-MCNC: 78 MG/DL (ref 65–99)
GLUCOSE BLD STRIP.AUTO-MCNC: 79 MG/DL (ref 65–99)
GLUCOSE BLD STRIP.AUTO-MCNC: 79 MG/DL (ref 65–99)
GLUCOSE BLD STRIP.AUTO-MCNC: 80 MG/DL (ref 65–99)
GLUCOSE BLD STRIP.AUTO-MCNC: 81 MG/DL (ref 65–99)
GLUCOSE BLD STRIP.AUTO-MCNC: 81 MG/DL (ref 65–99)
GLUCOSE BLD STRIP.AUTO-MCNC: 82 MG/DL (ref 65–99)
GLUCOSE BLD STRIP.AUTO-MCNC: 83 MG/DL (ref 65–99)
GLUCOSE BLD STRIP.AUTO-MCNC: 84 MG/DL (ref 65–99)
GLUCOSE BLD STRIP.AUTO-MCNC: 85 MG/DL (ref 65–99)
GLUCOSE BLD STRIP.AUTO-MCNC: 86 MG/DL (ref 65–99)
GLUCOSE BLD STRIP.AUTO-MCNC: 87 MG/DL (ref 65–99)
GLUCOSE BLD STRIP.AUTO-MCNC: 89 MG/DL (ref 65–99)
GLUCOSE BLD STRIP.AUTO-MCNC: 89 MG/DL (ref 65–99)
GLUCOSE BLD STRIP.AUTO-MCNC: 90 MG/DL (ref 65–99)
GLUCOSE BLD STRIP.AUTO-MCNC: 90 MG/DL (ref 65–99)
GLUCOSE BLD STRIP.AUTO-MCNC: 91 MG/DL (ref 65–99)
GLUCOSE BLD STRIP.AUTO-MCNC: 91 MG/DL (ref 65–99)
GLUCOSE BLD STRIP.AUTO-MCNC: 92 MG/DL (ref 65–99)
GLUCOSE BLD STRIP.AUTO-MCNC: 93 MG/DL (ref 65–99)
GLUCOSE BLD STRIP.AUTO-MCNC: 95 MG/DL (ref 65–99)
GLUCOSE BLD STRIP.AUTO-MCNC: 98 MG/DL (ref 65–99)
GLUCOSE BLD STRIP.AUTO-MCNC: 99 MG/DL (ref 65–99)
GLUCOSE BLD STRIP.AUTO-MCNC: <10 MG/DL (ref 65–99)
GLUCOSE SERPL-MCNC: 101 MG/DL (ref 65–99)
GLUCOSE SERPL-MCNC: 103 MG/DL (ref 65–99)
GLUCOSE SERPL-MCNC: 103 MG/DL (ref 65–99)
GLUCOSE SERPL-MCNC: 112 MG/DL (ref 65–99)
GLUCOSE SERPL-MCNC: 128 MG/DL (ref 65–99)
GLUCOSE SERPL-MCNC: 141 MG/DL (ref 65–99)
GLUCOSE SERPL-MCNC: 142 MG/DL (ref 65–99)
GLUCOSE SERPL-MCNC: 198 MG/DL (ref 65–99)
GLUCOSE SERPL-MCNC: 372 MG/DL (ref 65–99)
GLUCOSE SERPL-MCNC: 61 MG/DL (ref 65–99)
GLUCOSE SERPL-MCNC: 61 MG/DL (ref 65–99)
GLUCOSE SERPL-MCNC: 64 MG/DL (ref 65–99)
GLUCOSE SERPL-MCNC: 68 MG/DL (ref 65–99)
GLUCOSE SERPL-MCNC: 69 MG/DL (ref 65–99)
GLUCOSE SERPL-MCNC: 70 MG/DL (ref 65–99)
GLUCOSE SERPL-MCNC: 71 MG/DL (ref 65–99)
GLUCOSE SERPL-MCNC: 72 MG/DL (ref 65–99)
GLUCOSE SERPL-MCNC: 72 MG/DL (ref 65–99)
GLUCOSE SERPL-MCNC: 75 MG/DL (ref 65–99)
GLUCOSE SERPL-MCNC: 77 MG/DL (ref 65–99)
GLUCOSE SERPL-MCNC: 78 MG/DL (ref 65–99)
GLUCOSE SERPL-MCNC: 79 MG/DL (ref 65–99)
GLUCOSE SERPL-MCNC: 80 MG/DL (ref 65–99)
GLUCOSE SERPL-MCNC: 81 MG/DL (ref 65–99)
GLUCOSE SERPL-MCNC: 82 MG/DL (ref 65–99)
GLUCOSE SERPL-MCNC: 85 MG/DL (ref 65–99)
GLUCOSE SERPL-MCNC: 86 MG/DL (ref 65–99)
GLUCOSE SERPL-MCNC: 86 MG/DL (ref 65–99)
GLUCOSE SERPL-MCNC: 87 MG/DL (ref 65–99)
GLUCOSE SERPL-MCNC: 89 MG/DL (ref 65–99)
GLUCOSE SERPL-MCNC: 91 MG/DL (ref 65–99)
GLUCOSE SERPL-MCNC: 94 MG/DL (ref 65–99)
GLUCOSE SERPL-MCNC: 94 MG/DL (ref 65–99)
GLUCOSE SERPL-MCNC: 98 MG/DL (ref 65–99)
GLUCOSE SERPL-MCNC: 98 MG/DL (ref 65–99)
HAV IGM SERPL QL IA: NORMAL
HBV CORE IGM SER QL: NORMAL
HBV SURFACE AB SERPL IA-ACNC: 481 MIU/ML (ref 0–10)
HBV SURFACE AG SER QL: NORMAL
HCG SERPL QL: NEGATIVE
HCG SERPL QL: NEGATIVE
HCT VFR BLD AUTO: 20.8 % (ref 37–47)
HCT VFR BLD AUTO: 20.9 % (ref 37–47)
HCT VFR BLD AUTO: 21.1 % (ref 37–47)
HCT VFR BLD AUTO: 21.4 % (ref 37–47)
HCT VFR BLD AUTO: 21.9 % (ref 37–47)
HCT VFR BLD AUTO: 22.2 % (ref 37–47)
HCT VFR BLD AUTO: 22.4 % (ref 37–47)
HCT VFR BLD AUTO: 23.5 % (ref 37–47)
HCT VFR BLD AUTO: 23.5 % (ref 37–47)
HCT VFR BLD AUTO: 23.8 % (ref 37–47)
HCT VFR BLD AUTO: 24.3 % (ref 37–47)
HCT VFR BLD AUTO: 24.4 % (ref 37–47)
HCT VFR BLD AUTO: 24.6 % (ref 37–47)
HCT VFR BLD AUTO: 25 % (ref 37–47)
HCT VFR BLD AUTO: 25.1 % (ref 37–47)
HCT VFR BLD AUTO: 25.1 % (ref 37–47)
HCT VFR BLD AUTO: 25.2 % (ref 37–47)
HCT VFR BLD AUTO: 25.5 % (ref 37–47)
HCT VFR BLD AUTO: 25.5 % (ref 37–47)
HCT VFR BLD AUTO: 26.1 % (ref 37–47)
HCT VFR BLD AUTO: 26.5 % (ref 37–47)
HCT VFR BLD AUTO: 27.6 % (ref 37–47)
HCT VFR BLD AUTO: 27.9 % (ref 37–47)
HCT VFR BLD AUTO: 28.3 % (ref 37–47)
HCT VFR BLD AUTO: 28.5 % (ref 37–47)
HCT VFR BLD AUTO: 28.6 % (ref 37–47)
HCT VFR BLD AUTO: 28.8 % (ref 37–47)
HCT VFR BLD AUTO: 29.5 % (ref 37–47)
HCT VFR BLD AUTO: 29.6 % (ref 37–47)
HCT VFR BLD AUTO: 29.7 % (ref 37–47)
HCT VFR BLD AUTO: 30.7 % (ref 37–47)
HCT VFR BLD AUTO: 31.2 % (ref 37–47)
HCT VFR BLD AUTO: 32.4 % (ref 37–47)
HCT VFR BLD AUTO: 34 % (ref 37–47)
HCT VFR BLD AUTO: 34.2 % (ref 37–47)
HCT VFR BLD AUTO: 34.2 % (ref 37–47)
HCT VFR BLD AUTO: 34.5 % (ref 37–47)
HCT VFR BLD AUTO: 34.7 % (ref 37–47)
HCT VFR BLD AUTO: 39.9 % (ref 37–47)
HCV AB SER QL: NORMAL
HDLC SERPL-MCNC: 15 MG/DL
HGB BLD-MCNC: 10.1 G/DL (ref 12–16)
HGB BLD-MCNC: 10.1 G/DL (ref 12–16)
HGB BLD-MCNC: 10.2 G/DL (ref 12–16)
HGB BLD-MCNC: 10.2 G/DL (ref 12–16)
HGB BLD-MCNC: 10.3 G/DL (ref 12–16)
HGB BLD-MCNC: 11.8 G/DL (ref 12–16)
HGB BLD-MCNC: 6 G/DL (ref 12–16)
HGB BLD-MCNC: 6.1 G/DL (ref 12–16)
HGB BLD-MCNC: 6.4 G/DL (ref 12–16)
HGB BLD-MCNC: 6.6 G/DL (ref 12–16)
HGB BLD-MCNC: 6.7 G/DL (ref 12–16)
HGB BLD-MCNC: 6.9 G/DL (ref 12–16)
HGB BLD-MCNC: 7.3 G/DL (ref 12–16)
HGB BLD-MCNC: 7.4 G/DL (ref 12–16)
HGB BLD-MCNC: 7.4 G/DL (ref 12–16)
HGB BLD-MCNC: 7.5 G/DL (ref 12–16)
HGB BLD-MCNC: 7.6 G/DL (ref 12–16)
HGB BLD-MCNC: 7.9 G/DL (ref 12–16)
HGB BLD-MCNC: 8 G/DL (ref 12–16)
HGB BLD-MCNC: 8.1 G/DL (ref 12–16)
HGB BLD-MCNC: 8.1 G/DL (ref 12–16)
HGB BLD-MCNC: 8.3 G/DL (ref 12–16)
HGB BLD-MCNC: 8.4 G/DL (ref 12–16)
HGB BLD-MCNC: 8.5 G/DL (ref 12–16)
HGB BLD-MCNC: 8.6 G/DL (ref 12–16)
HGB BLD-MCNC: 8.6 G/DL (ref 12–16)
HGB BLD-MCNC: 8.8 G/DL (ref 12–16)
HGB BLD-MCNC: 9.1 G/DL (ref 12–16)
HGB BLD-MCNC: 9.2 G/DL (ref 12–16)
HGB BLD-MCNC: 9.6 G/DL (ref 12–16)
HGB BLD-MCNC: 9.6 G/DL (ref 12–16)
HGB BLD-MCNC: 9.9 G/DL (ref 12–16)
HYPOCHROMIA BLD QL SMEAR: ABNORMAL
IMM GRANULOCYTES # BLD AUTO: 0 K/UL (ref 0–0.11)
IMM GRANULOCYTES # BLD AUTO: 0.01 K/UL (ref 0–0.11)
IMM GRANULOCYTES # BLD AUTO: 0.02 K/UL (ref 0–0.11)
IMM GRANULOCYTES # BLD AUTO: 0.03 K/UL (ref 0–0.11)
IMM GRANULOCYTES # BLD AUTO: 0.03 K/UL (ref 0–0.11)
IMM GRANULOCYTES # BLD AUTO: 0.04 K/UL (ref 0–0.11)
IMM GRANULOCYTES # BLD AUTO: 0.06 K/UL (ref 0–0.11)
IMM GRANULOCYTES NFR BLD AUTO: 0 % (ref 0–0.9)
IMM GRANULOCYTES NFR BLD AUTO: 0.2 % (ref 0–0.9)
IMM GRANULOCYTES NFR BLD AUTO: 0.3 % (ref 0–0.9)
IMM GRANULOCYTES NFR BLD AUTO: 0.4 % (ref 0–0.9)
IMM GRANULOCYTES NFR BLD AUTO: 0.5 % (ref 0–0.9)
IMM GRANULOCYTES NFR BLD AUTO: 0.8 % (ref 0–0.9)
IMM GRANULOCYTES NFR BLD AUTO: 1.1 % (ref 0–0.9)
IMM GRANULOCYTES NFR BLD AUTO: 1.2 % (ref 0–0.9)
INR PPP: 1.43 (ref 0.87–1.13)
INR PPP: 1.47 (ref 0.87–1.13)
INSULIN P FAST SERPL-ACNC: 2 UIU/ML (ref 3–25)
IRON SATN MFR SERPL: ABNORMAL % (ref 15–55)
IRON SERPL-MCNC: 59 UG/DL (ref 40–170)
LACTATE SERPL-SCNC: 2.4 MMOL/L (ref 0.5–2)
LDLC SERPL CALC-MCNC: 47 MG/DL
LG PLATELETS BLD QL SMEAR: NORMAL
LIPASE SERPL-CCNC: 24 U/L (ref 11–82)
LV EJECT FRACT  99904: 45
LV EJECT FRACT MOD 2C 99903: 46.29
LV EJECT FRACT MOD 4C 99902: 50.28
LV EJECT FRACT MOD BP 99901: 49
LYMPHOCYTES # BLD AUTO: 0 K/UL (ref 1–4.8)
LYMPHOCYTES # BLD AUTO: 0.11 K/UL (ref 1–4.8)
LYMPHOCYTES # BLD AUTO: 0.14 K/UL (ref 1–4.8)
LYMPHOCYTES # BLD AUTO: 0.3 K/UL (ref 1–4.8)
LYMPHOCYTES # BLD AUTO: 0.36 K/UL (ref 1–4.8)
LYMPHOCYTES # BLD AUTO: 0.36 K/UL (ref 1–4.8)
LYMPHOCYTES # BLD AUTO: 0.38 K/UL (ref 1–4.8)
LYMPHOCYTES # BLD AUTO: 0.38 K/UL (ref 1–4.8)
LYMPHOCYTES # BLD AUTO: 0.39 K/UL (ref 1–4.8)
LYMPHOCYTES # BLD AUTO: 0.4 K/UL (ref 1–4.8)
LYMPHOCYTES # BLD AUTO: 0.44 K/UL (ref 1–4.8)
LYMPHOCYTES # BLD AUTO: 0.47 K/UL (ref 1–4.8)
LYMPHOCYTES # BLD AUTO: 0.52 K/UL (ref 1–4.8)
LYMPHOCYTES # BLD AUTO: 0.54 K/UL (ref 1–4.8)
LYMPHOCYTES # BLD AUTO: 0.58 K/UL (ref 1–4.8)
LYMPHOCYTES # BLD AUTO: 0.63 K/UL (ref 1–4.8)
LYMPHOCYTES # BLD AUTO: 0.69 K/UL (ref 1–4.8)
LYMPHOCYTES # BLD AUTO: 0.73 K/UL (ref 1–4.8)
LYMPHOCYTES # BLD AUTO: 0.73 K/UL (ref 1–4.8)
LYMPHOCYTES # BLD AUTO: 0.76 K/UL (ref 1–4.8)
LYMPHOCYTES # BLD AUTO: 0.77 K/UL (ref 1–4.8)
LYMPHOCYTES # BLD AUTO: 0.8 K/UL (ref 1–4.8)
LYMPHOCYTES # BLD AUTO: 0.86 K/UL (ref 1–4.8)
LYMPHOCYTES # BLD AUTO: 0.89 K/UL (ref 1–4.8)
LYMPHOCYTES # BLD AUTO: 1 K/UL (ref 1–4.8)
LYMPHOCYTES # BLD AUTO: 1.08 K/UL (ref 1–4.8)
LYMPHOCYTES NFR BLD: 0 % (ref 22–41)
LYMPHOCYTES NFR BLD: 1.7 % (ref 22–41)
LYMPHOCYTES NFR BLD: 12.8 % (ref 22–41)
LYMPHOCYTES NFR BLD: 13 % (ref 22–41)
LYMPHOCYTES NFR BLD: 13.2 % (ref 22–41)
LYMPHOCYTES NFR BLD: 14.9 % (ref 22–41)
LYMPHOCYTES NFR BLD: 14.9 % (ref 22–41)
LYMPHOCYTES NFR BLD: 15 % (ref 22–41)
LYMPHOCYTES NFR BLD: 15.8 % (ref 22–41)
LYMPHOCYTES NFR BLD: 16.3 % (ref 22–41)
LYMPHOCYTES NFR BLD: 16.3 % (ref 22–41)
LYMPHOCYTES NFR BLD: 16.6 % (ref 22–41)
LYMPHOCYTES NFR BLD: 16.6 % (ref 22–41)
LYMPHOCYTES NFR BLD: 16.8 % (ref 22–41)
LYMPHOCYTES NFR BLD: 18 % (ref 22–41)
LYMPHOCYTES NFR BLD: 18.2 % (ref 22–41)
LYMPHOCYTES NFR BLD: 19 % (ref 22–41)
LYMPHOCYTES NFR BLD: 19.3 % (ref 22–41)
LYMPHOCYTES NFR BLD: 19.7 % (ref 22–41)
LYMPHOCYTES NFR BLD: 20.1 % (ref 22–41)
LYMPHOCYTES NFR BLD: 20.8 % (ref 22–41)
LYMPHOCYTES NFR BLD: 22.2 % (ref 22–41)
LYMPHOCYTES NFR BLD: 23.7 % (ref 22–41)
LYMPHOCYTES NFR BLD: 27.4 % (ref 22–41)
LYMPHOCYTES NFR BLD: 3.5 % (ref 22–41)
LYMPHOCYTES NFR BLD: 8 % (ref 22–41)
MACROCYTES BLD QL SMEAR: ABNORMAL
MAGNESIUM SERPL-MCNC: 1.2 MG/DL (ref 1.5–2.5)
MAGNESIUM SERPL-MCNC: 1.4 MG/DL (ref 1.5–2.5)
MAGNESIUM SERPL-MCNC: 1.5 MG/DL (ref 1.5–2.5)
MAGNESIUM SERPL-MCNC: 1.6 MG/DL (ref 1.5–2.5)
MAGNESIUM SERPL-MCNC: 1.7 MG/DL (ref 1.5–2.5)
MAGNESIUM SERPL-MCNC: 1.8 MG/DL (ref 1.5–2.5)
MAGNESIUM SERPL-MCNC: 1.9 MG/DL (ref 1.5–2.5)
MAGNESIUM SERPL-MCNC: 1.9 MG/DL (ref 1.5–2.5)
MAGNESIUM SERPL-MCNC: 2 MG/DL (ref 1.5–2.5)
MAGNESIUM SERPL-MCNC: 2.1 MG/DL (ref 1.5–2.5)
MAGNESIUM SERPL-MCNC: 2.1 MG/DL (ref 1.5–2.5)
MAGNESIUM SERPL-MCNC: 2.2 MG/DL (ref 1.5–2.5)
MANUAL DIFF BLD: NORMAL
MCF BLD TEG: 54.1 MM (ref 52–69)
MCF BLD TEG: 55.3 MM (ref 52–69)
MCF.PLATELET INHIB BLD ROTEM: 22.1 MM (ref 15–32)
MCF.PLATELET INHIB BLD ROTEM: 27 MM (ref 15–32)
MCH RBC QN AUTO: 21.4 PG (ref 27–33)
MCH RBC QN AUTO: 21.9 PG (ref 27–33)
MCH RBC QN AUTO: 23 PG (ref 27–33)
MCH RBC QN AUTO: 23.3 PG (ref 27–33)
MCH RBC QN AUTO: 23.4 PG (ref 27–33)
MCH RBC QN AUTO: 23.5 PG (ref 27–33)
MCH RBC QN AUTO: 23.5 PG (ref 27–33)
MCH RBC QN AUTO: 23.6 PG (ref 27–33)
MCH RBC QN AUTO: 23.6 PG (ref 27–33)
MCH RBC QN AUTO: 23.7 PG (ref 27–33)
MCH RBC QN AUTO: 23.8 PG (ref 27–33)
MCH RBC QN AUTO: 23.8 PG (ref 27–33)
MCH RBC QN AUTO: 23.9 PG (ref 27–33)
MCH RBC QN AUTO: 24 PG (ref 27–33)
MCH RBC QN AUTO: 24.1 PG (ref 27–33)
MCH RBC QN AUTO: 24.2 PG (ref 27–33)
MCH RBC QN AUTO: 24.3 PG (ref 27–33)
MCH RBC QN AUTO: 24.4 PG (ref 27–33)
MCH RBC QN AUTO: 24.7 PG (ref 27–33)
MCH RBC QN AUTO: 24.8 PG (ref 27–33)
MCH RBC QN AUTO: 24.8 PG (ref 27–33)
MCH RBC QN AUTO: 24.9 PG (ref 27–33)
MCH RBC QN AUTO: 25.3 PG (ref 27–33)
MCH RBC QN AUTO: 25.7 PG (ref 27–33)
MCH RBC QN AUTO: 25.9 PG (ref 27–33)
MCH RBC QN AUTO: 26.1 PG (ref 27–33)
MCH RBC QN AUTO: 26.2 PG (ref 27–33)
MCH RBC QN AUTO: 26.3 PG (ref 27–33)
MCH RBC QN AUTO: 26.3 PG (ref 27–33)
MCH RBC QN AUTO: 26.6 PG (ref 27–33)
MCH RBC QN AUTO: 26.9 PG (ref 27–33)
MCH RBC QN AUTO: 27.2 PG (ref 27–33)
MCHC RBC AUTO-ENTMCNC: 28.1 G/DL (ref 33.6–35)
MCHC RBC AUTO-ENTMCNC: 28.8 G/DL (ref 32.2–35.5)
MCHC RBC AUTO-ENTMCNC: 28.9 G/DL (ref 33.6–35)
MCHC RBC AUTO-ENTMCNC: 29 G/DL (ref 32.2–35.5)
MCHC RBC AUTO-ENTMCNC: 29 G/DL (ref 32.2–35.5)
MCHC RBC AUTO-ENTMCNC: 29.1 G/DL (ref 33.6–35)
MCHC RBC AUTO-ENTMCNC: 29.2 G/DL (ref 32.2–35.5)
MCHC RBC AUTO-ENTMCNC: 29.3 G/DL (ref 32.2–35.5)
MCHC RBC AUTO-ENTMCNC: 29.4 G/DL (ref 32.2–35.5)
MCHC RBC AUTO-ENTMCNC: 29.6 G/DL (ref 32.2–35.5)
MCHC RBC AUTO-ENTMCNC: 29.6 G/DL (ref 33.6–35)
MCHC RBC AUTO-ENTMCNC: 29.7 G/DL (ref 33.6–35)
MCHC RBC AUTO-ENTMCNC: 29.7 G/DL (ref 33.6–35)
MCHC RBC AUTO-ENTMCNC: 29.8 G/DL (ref 32.2–35.5)
MCHC RBC AUTO-ENTMCNC: 29.8 G/DL (ref 32.2–35.5)
MCHC RBC AUTO-ENTMCNC: 29.8 G/DL (ref 33.6–35)
MCHC RBC AUTO-ENTMCNC: 29.9 G/DL (ref 32.2–35.5)
MCHC RBC AUTO-ENTMCNC: 30 G/DL (ref 32.2–35.5)
MCHC RBC AUTO-ENTMCNC: 30.3 G/DL (ref 32.2–35.5)
MCHC RBC AUTO-ENTMCNC: 30.3 G/DL (ref 32.2–35.5)
MCHC RBC AUTO-ENTMCNC: 30.6 G/DL (ref 32.2–35.5)
MCHC RBC AUTO-ENTMCNC: 30.7 G/DL (ref 32.2–35.5)
MCHC RBC AUTO-ENTMCNC: 30.7 G/DL (ref 32.2–35.5)
MCHC RBC AUTO-ENTMCNC: 30.8 G/DL (ref 32.2–35.5)
MCHC RBC AUTO-ENTMCNC: 30.9 G/DL (ref 32.2–35.5)
MCHC RBC AUTO-ENTMCNC: 31 G/DL (ref 32.2–35.5)
MCHC RBC AUTO-ENTMCNC: 31 G/DL (ref 32.2–35.5)
MCHC RBC AUTO-ENTMCNC: 31.2 G/DL (ref 32.2–35.5)
MCHC RBC AUTO-ENTMCNC: 31.3 G/DL (ref 32.2–35.5)
MCV RBC AUTO: 74 FL (ref 81.4–97.8)
MCV RBC AUTO: 75.1 FL (ref 81.4–97.8)
MCV RBC AUTO: 75.6 FL (ref 81.4–97.8)
MCV RBC AUTO: 75.8 FL (ref 81.4–97.8)
MCV RBC AUTO: 76.7 FL (ref 81.4–97.8)
MCV RBC AUTO: 76.8 FL (ref 81.4–97.8)
MCV RBC AUTO: 77 FL (ref 81.4–97.8)
MCV RBC AUTO: 77.2 FL (ref 81.4–97.8)
MCV RBC AUTO: 77.7 FL (ref 81.4–97.8)
MCV RBC AUTO: 78.7 FL (ref 81.4–97.8)
MCV RBC AUTO: 78.8 FL (ref 81.4–97.8)
MCV RBC AUTO: 80.3 FL (ref 81.4–97.8)
MCV RBC AUTO: 80.4 FL (ref 81.4–97.8)
MCV RBC AUTO: 80.4 FL (ref 81.4–97.8)
MCV RBC AUTO: 80.7 FL (ref 81.4–97.8)
MCV RBC AUTO: 81 FL (ref 81.4–97.8)
MCV RBC AUTO: 81 FL (ref 81.4–97.8)
MCV RBC AUTO: 81.2 FL (ref 81.4–97.8)
MCV RBC AUTO: 82 FL (ref 81.4–97.8)
MCV RBC AUTO: 82.5 FL (ref 81.4–97.8)
MCV RBC AUTO: 82.7 FL (ref 81.4–97.8)
MCV RBC AUTO: 82.8 FL (ref 81.4–97.8)
MCV RBC AUTO: 83.1 FL (ref 81.4–97.8)
MCV RBC AUTO: 83.8 FL (ref 81.4–97.8)
MCV RBC AUTO: 84 FL (ref 81.4–97.8)
MCV RBC AUTO: 84.9 FL (ref 81.4–97.8)
MCV RBC AUTO: 85.5 FL (ref 81.4–97.8)
MCV RBC AUTO: 85.8 FL (ref 81.4–97.8)
MCV RBC AUTO: 87.5 FL (ref 81.4–97.8)
MCV RBC AUTO: 87.6 FL (ref 81.4–97.8)
MCV RBC AUTO: 87.8 FL (ref 81.4–97.8)
MCV RBC AUTO: 88 FL (ref 81.4–97.8)
MCV RBC AUTO: 88.5 FL (ref 81.4–97.8)
MCV RBC AUTO: 91.2 FL (ref 81.4–97.8)
MICROCYTES BLD QL SMEAR: ABNORMAL
MONOCYTES # BLD AUTO: 0.02 K/UL (ref 0–0.85)
MONOCYTES # BLD AUTO: 0.03 K/UL (ref 0–0.85)
MONOCYTES # BLD AUTO: 0.06 K/UL (ref 0–0.85)
MONOCYTES # BLD AUTO: 0.06 K/UL (ref 0–0.85)
MONOCYTES # BLD AUTO: 0.08 K/UL (ref 0–0.85)
MONOCYTES # BLD AUTO: 0.08 K/UL (ref 0–0.85)
MONOCYTES # BLD AUTO: 0.09 K/UL (ref 0–0.85)
MONOCYTES # BLD AUTO: 0.09 K/UL (ref 0–0.85)
MONOCYTES # BLD AUTO: 0.1 K/UL (ref 0–0.85)
MONOCYTES # BLD AUTO: 0.11 K/UL (ref 0–0.85)
MONOCYTES # BLD AUTO: 0.15 K/UL (ref 0–0.85)
MONOCYTES # BLD AUTO: 0.18 K/UL (ref 0–0.85)
MONOCYTES # BLD AUTO: 0.2 K/UL (ref 0–0.85)
MONOCYTES # BLD AUTO: 0.2 K/UL (ref 0–0.85)
MONOCYTES # BLD AUTO: 0.21 K/UL (ref 0–0.85)
MONOCYTES # BLD AUTO: 0.21 K/UL (ref 0–0.85)
MONOCYTES # BLD AUTO: 0.22 K/UL (ref 0–0.85)
MONOCYTES # BLD AUTO: 0.23 K/UL (ref 0–0.85)
MONOCYTES # BLD AUTO: 0.23 K/UL (ref 0–0.85)
MONOCYTES # BLD AUTO: 0.24 K/UL (ref 0–0.85)
MONOCYTES # BLD AUTO: 0.32 K/UL (ref 0–0.85)
MONOCYTES # BLD AUTO: 0.33 K/UL (ref 0–0.85)
MONOCYTES # BLD AUTO: 0.34 K/UL (ref 0–0.85)
MONOCYTES # BLD AUTO: 0.36 K/UL (ref 0–0.85)
MONOCYTES # BLD AUTO: 0.43 K/UL (ref 0–0.85)
MONOCYTES # BLD AUTO: 0.71 K/UL (ref 0–0.85)
MONOCYTES NFR BLD AUTO: 0.8 % (ref 0–13.4)
MONOCYTES NFR BLD AUTO: 0.9 % (ref 0–13.4)
MONOCYTES NFR BLD AUTO: 1.7 % (ref 0–13.4)
MONOCYTES NFR BLD AUTO: 1.7 % (ref 0–13.4)
MONOCYTES NFR BLD AUTO: 11.5 % (ref 0–13.4)
MONOCYTES NFR BLD AUTO: 13.7 % (ref 0–13.4)
MONOCYTES NFR BLD AUTO: 2.5 % (ref 0–13.4)
MONOCYTES NFR BLD AUTO: 2.6 % (ref 0–13.4)
MONOCYTES NFR BLD AUTO: 3 % (ref 0–13.4)
MONOCYTES NFR BLD AUTO: 3.5 % (ref 0–13.4)
MONOCYTES NFR BLD AUTO: 3.6 % (ref 0–13.4)
MONOCYTES NFR BLD AUTO: 3.8 % (ref 0–13.4)
MONOCYTES NFR BLD AUTO: 3.9 % (ref 0–13.4)
MONOCYTES NFR BLD AUTO: 4.3 % (ref 0–13.4)
MONOCYTES NFR BLD AUTO: 4.5 % (ref 0–13.4)
MONOCYTES NFR BLD AUTO: 4.6 % (ref 0–13.4)
MONOCYTES NFR BLD AUTO: 4.7 % (ref 0–13.4)
MONOCYTES NFR BLD AUTO: 5.1 % (ref 0–13.4)
MONOCYTES NFR BLD AUTO: 5.2 % (ref 0–13.4)
MONOCYTES NFR BLD AUTO: 5.2 % (ref 0–13.4)
MONOCYTES NFR BLD AUTO: 6.3 % (ref 0–13.4)
MONOCYTES NFR BLD AUTO: 6.4 % (ref 0–13.4)
MONOCYTES NFR BLD AUTO: 6.6 % (ref 0–13.4)
MONOCYTES NFR BLD AUTO: 7.7 % (ref 0–13.4)
MONOCYTES NFR BLD AUTO: 9 % (ref 0–13.4)
MONOCYTES NFR BLD AUTO: 9.9 % (ref 0–13.4)
MORPHOLOGY BLD-IMP: NORMAL
MYELOCYTES NFR BLD MANUAL: 0.8 %
NEUTROPHILS # BLD AUTO: 1.17 K/UL (ref 2–7.15)
NEUTROPHILS # BLD AUTO: 1.3 K/UL (ref 1.82–7.42)
NEUTROPHILS # BLD AUTO: 1.47 K/UL (ref 2–7.15)
NEUTROPHILS # BLD AUTO: 1.78 K/UL (ref 2–7.15)
NEUTROPHILS # BLD AUTO: 1.86 K/UL (ref 2–7.15)
NEUTROPHILS # BLD AUTO: 1.88 K/UL (ref 1.82–7.42)
NEUTROPHILS # BLD AUTO: 1.9 K/UL (ref 2–7.15)
NEUTROPHILS # BLD AUTO: 13.15 K/UL (ref 1.82–7.42)
NEUTROPHILS # BLD AUTO: 2.02 K/UL (ref 2–7.15)
NEUTROPHILS # BLD AUTO: 2.27 K/UL (ref 1.82–7.42)
NEUTROPHILS # BLD AUTO: 2.27 K/UL (ref 1.82–7.42)
NEUTROPHILS # BLD AUTO: 2.43 K/UL (ref 1.82–7.42)
NEUTROPHILS # BLD AUTO: 2.52 K/UL (ref 1.82–7.42)
NEUTROPHILS # BLD AUTO: 2.52 K/UL (ref 2–7.15)
NEUTROPHILS # BLD AUTO: 2.88 K/UL (ref 1.82–7.42)
NEUTROPHILS # BLD AUTO: 2.95 K/UL (ref 1.82–7.42)
NEUTROPHILS # BLD AUTO: 2.98 K/UL (ref 1.82–7.42)
NEUTROPHILS # BLD AUTO: 3.24 K/UL (ref 1.82–7.42)
NEUTROPHILS # BLD AUTO: 3.25 K/UL (ref 1.82–7.42)
NEUTROPHILS # BLD AUTO: 3.31 K/UL (ref 1.82–7.42)
NEUTROPHILS # BLD AUTO: 3.4 K/UL (ref 1.82–7.42)
NEUTROPHILS # BLD AUTO: 3.58 K/UL (ref 1.82–7.42)
NEUTROPHILS # BLD AUTO: 4.16 K/UL (ref 1.82–7.42)
NEUTROPHILS # BLD AUTO: 4.76 K/UL (ref 1.82–7.42)
NEUTROPHILS # BLD AUTO: 6.72 K/UL (ref 1.82–7.42)
NEUTROPHILS # BLD AUTO: 7.9 K/UL (ref 1.82–7.42)
NEUTROPHILS NFR BLD: 60.5 % (ref 44–72)
NEUTROPHILS NFR BLD: 61.3 % (ref 44–72)
NEUTROPHILS NFR BLD: 63.8 % (ref 44–72)
NEUTROPHILS NFR BLD: 68.8 % (ref 44–72)
NEUTROPHILS NFR BLD: 69.7 % (ref 44–72)
NEUTROPHILS NFR BLD: 72.2 % (ref 44–72)
NEUTROPHILS NFR BLD: 73.8 % (ref 44–72)
NEUTROPHILS NFR BLD: 73.9 % (ref 44–72)
NEUTROPHILS NFR BLD: 74.7 % (ref 44–72)
NEUTROPHILS NFR BLD: 75.9 % (ref 44–72)
NEUTROPHILS NFR BLD: 76 % (ref 44–72)
NEUTROPHILS NFR BLD: 76.2 % (ref 44–72)
NEUTROPHILS NFR BLD: 76.2 % (ref 44–72)
NEUTROPHILS NFR BLD: 76.7 % (ref 44–72)
NEUTROPHILS NFR BLD: 77.6 % (ref 44–72)
NEUTROPHILS NFR BLD: 77.8 % (ref 44–72)
NEUTROPHILS NFR BLD: 78.9 % (ref 44–72)
NEUTROPHILS NFR BLD: 80.7 % (ref 44–72)
NEUTROPHILS NFR BLD: 80.8 % (ref 44–72)
NEUTROPHILS NFR BLD: 80.9 % (ref 44–72)
NEUTROPHILS NFR BLD: 81 % (ref 44–72)
NEUTROPHILS NFR BLD: 81.8 % (ref 44–72)
NEUTROPHILS NFR BLD: 87 % (ref 44–72)
NEUTROPHILS NFR BLD: 93 % (ref 44–72)
NEUTROPHILS NFR BLD: 96.5 % (ref 44–72)
NEUTROPHILS NFR BLD: 96.7 % (ref 44–72)
NEUTS BAND NFR BLD MANUAL: 0.8 % (ref 0–10)
NEUTS BAND NFR BLD MANUAL: 0.9 % (ref 0–10)
NRBC # BLD AUTO: 0 K/UL
NRBC # BLD AUTO: 0.02 K/UL
NRBC BLD-RTO: 0 /100 WBC
NRBC BLD-RTO: 0 /100 WBC (ref 0–0.2)
NRBC BLD-RTO: 0.4 /100 WBC (ref 0–0.2)
NRBC BLD-RTO: 0.5 /100 WBC (ref 0–0.2)
NRBC BLD-RTO: 0.8 /100 WBC (ref 0–0.2)
OVALOCYTES BLD QL SMEAR: NORMAL
PA AA BLD-ACNC: 11.3 % (ref 0–11)
PA AA BLD-ACNC: 19 % (ref 0–11)
PA ADP BLD-ACNC: 19.6 % (ref 0–17)
PA ADP BLD-ACNC: 3.4 % (ref 0–17)
PHOSPHATE SERPL-MCNC: 2 MG/DL (ref 2.5–4.5)
PHOSPHATE SERPL-MCNC: 2.3 MG/DL (ref 2.5–4.5)
PHOSPHATE SERPL-MCNC: 2.6 MG/DL (ref 2.5–4.5)
PHOSPHATE SERPL-MCNC: 2.8 MG/DL (ref 2.5–4.5)
PHOSPHATE SERPL-MCNC: 3 MG/DL (ref 2.5–4.5)
PHOSPHATE SERPL-MCNC: 3.2 MG/DL (ref 2.5–4.5)
PHOSPHATE SERPL-MCNC: 3.3 MG/DL (ref 2.5–4.5)
PHOSPHATE SERPL-MCNC: 3.4 MG/DL (ref 2.5–4.5)
PHOSPHATE SERPL-MCNC: 3.6 MG/DL (ref 2.5–4.5)
PHOSPHATE SERPL-MCNC: 3.7 MG/DL (ref 2.5–4.5)
PHOSPHATE SERPL-MCNC: 3.8 MG/DL (ref 2.5–4.5)
PHOSPHATE SERPL-MCNC: 4.2 MG/DL (ref 2.5–4.5)
PHYTONADIONE SERPL-MCNC: 0.54 NMOL/L (ref 0.22–4.88)
PLATELET # BLD AUTO: 101 K/UL (ref 164–446)
PLATELET # BLD AUTO: 104 K/UL (ref 164–446)
PLATELET # BLD AUTO: 107 K/UL (ref 164–446)
PLATELET # BLD AUTO: 108 K/UL (ref 164–446)
PLATELET # BLD AUTO: 116 K/UL (ref 164–446)
PLATELET # BLD AUTO: 123 K/UL (ref 164–446)
PLATELET # BLD AUTO: 126 K/UL (ref 164–446)
PLATELET # BLD AUTO: 127 K/UL (ref 164–446)
PLATELET # BLD AUTO: 127 K/UL (ref 164–446)
PLATELET # BLD AUTO: 128 K/UL (ref 164–446)
PLATELET # BLD AUTO: 130 K/UL (ref 164–446)
PLATELET # BLD AUTO: 137 K/UL (ref 164–446)
PLATELET # BLD AUTO: 141 K/UL (ref 164–446)
PLATELET # BLD AUTO: 146 K/UL (ref 164–446)
PLATELET # BLD AUTO: 150 K/UL (ref 164–446)
PLATELET # BLD AUTO: 165 K/UL (ref 164–446)
PLATELET # BLD AUTO: 167 K/UL (ref 164–446)
PLATELET # BLD AUTO: 167 K/UL (ref 164–446)
PLATELET # BLD AUTO: 171 K/UL (ref 164–446)
PLATELET # BLD AUTO: 173 K/UL (ref 164–446)
PLATELET # BLD AUTO: 181 K/UL (ref 164–446)
PLATELET # BLD AUTO: 186 K/UL (ref 164–446)
PLATELET # BLD AUTO: 187 K/UL (ref 164–446)
PLATELET # BLD AUTO: 190 K/UL (ref 164–446)
PLATELET # BLD AUTO: 195 K/UL (ref 164–446)
PLATELET # BLD AUTO: 205 K/UL (ref 164–446)
PLATELET # BLD AUTO: 260 K/UL (ref 164–446)
PLATELET # BLD AUTO: 58 K/UL (ref 164–446)
PLATELET # BLD AUTO: 64 K/UL (ref 164–446)
PLATELET # BLD AUTO: 69 K/UL (ref 164–446)
PLATELET # BLD AUTO: 88 K/UL (ref 164–446)
PLATELET # BLD AUTO: 90 K/UL (ref 164–446)
PLATELET # BLD AUTO: 97 K/UL (ref 164–446)
PLATELET # BLD AUTO: 98 K/UL (ref 164–446)
PLATELET BLD QL SMEAR: NORMAL
PLATELETS.RETICULATED NFR BLD AUTO: 12.5 % (ref 0.6–13.1)
PLATELETS.RETICULATED NFR BLD AUTO: 3.1 % (ref 0.6–13.1)
PLATELETS.RETICULATED NFR BLD AUTO: 4.3 % (ref 0.6–13.1)
PLATELETS.RETICULATED NFR BLD AUTO: 4.8 % (ref 0.6–13.1)
PLATELETS.RETICULATED NFR BLD AUTO: 5 % (ref 0.6–13.1)
PLATELETS.RETICULATED NFR BLD AUTO: 5.2 % (ref 0.6–13.1)
PLATELETS.RETICULATED NFR BLD AUTO: 5.2 % (ref 0.6–13.1)
PLATELETS.RETICULATED NFR BLD AUTO: 8 % (ref 0.6–13.1)
PLATELETS.RETICULATED NFR BLD AUTO: 9.4 % (ref 0.6–13.1)
PMV BLD AUTO: 10 FL (ref 9–12.9)
PMV BLD AUTO: 10.2 FL (ref 9–12.9)
PMV BLD AUTO: 9 FL (ref 9–12.9)
PMV BLD AUTO: 9.2 FL (ref 9–12.9)
PMV BLD AUTO: 9.2 FL (ref 9–12.9)
PMV BLD AUTO: 9.3 FL (ref 9–12.9)
PMV BLD AUTO: 9.4 FL (ref 9–12.9)
PMV BLD AUTO: 9.5 FL (ref 9–12.9)
PMV BLD AUTO: 9.6 FL (ref 9–12.9)
PMV BLD AUTO: 9.8 FL (ref 9–12.9)
PMV BLD AUTO: 9.8 FL (ref 9–12.9)
PMV BLD AUTO: 9.9 FL (ref 9–12.9)
PMV BLD AUTO: 9.9 FL (ref 9–12.9)
POIKILOCYTOSIS BLD QL SMEAR: NORMAL
POLYCHROMASIA BLD QL SMEAR: NORMAL
POTASSIUM SERPL-SCNC: 3.7 MMOL/L (ref 3.6–5.5)
POTASSIUM SERPL-SCNC: 3.8 MMOL/L (ref 3.6–5.5)
POTASSIUM SERPL-SCNC: 3.9 MMOL/L (ref 3.6–5.5)
POTASSIUM SERPL-SCNC: 3.9 MMOL/L (ref 3.6–5.5)
POTASSIUM SERPL-SCNC: 4 MMOL/L (ref 3.6–5.5)
POTASSIUM SERPL-SCNC: 4.1 MMOL/L (ref 3.6–5.5)
POTASSIUM SERPL-SCNC: 4.2 MMOL/L (ref 3.6–5.5)
POTASSIUM SERPL-SCNC: 4.3 MMOL/L (ref 3.6–5.5)
POTASSIUM SERPL-SCNC: 4.4 MMOL/L (ref 3.6–5.5)
POTASSIUM SERPL-SCNC: 4.5 MMOL/L (ref 3.6–5.5)
POTASSIUM SERPL-SCNC: 4.6 MMOL/L (ref 3.6–5.5)
POTASSIUM SERPL-SCNC: 4.7 MMOL/L (ref 3.6–5.5)
POTASSIUM SERPL-SCNC: 4.9 MMOL/L (ref 3.6–5.5)
PREALB SERPL-MCNC: 5 MG/DL (ref 18–38)
PREALB SERPL-MCNC: 6.4 MG/DL (ref 18–38)
PREALB SERPL-MCNC: 6.6 MG/DL (ref 18–38)
PREALB SERPL-MCNC: 6.7 MG/DL (ref 18–38)
PROCALCITONIN SERPL-MCNC: 42.2 NG/ML
PROCALCITONIN SERPL-MCNC: 75.5 NG/ML
PRODUCT TYPE UPROD: NORMAL
PROT SERPL-MCNC: 6.4 G/DL (ref 6–8.2)
PROT SERPL-MCNC: 6.9 G/DL (ref 6–8.2)
PROT SERPL-MCNC: 7 G/DL (ref 6–8.2)
PROT SERPL-MCNC: 7.2 G/DL (ref 6–8.2)
PROT SERPL-MCNC: 7.5 G/DL (ref 6–8.2)
PROT SERPL-MCNC: 7.5 G/DL (ref 6–8.2)
PROT SERPL-MCNC: 7.8 G/DL (ref 6–8.2)
PROT SERPL-MCNC: 7.8 G/DL (ref 6–8.2)
PROT SERPL-MCNC: 8 G/DL (ref 6–8.2)
PROT SERPL-MCNC: 8.1 G/DL (ref 6–8.2)
PROT SERPL-MCNC: 8.1 G/DL (ref 6–8.2)
PROT SERPL-MCNC: 8.2 G/DL (ref 6–8.2)
PROT SERPL-MCNC: 8.3 G/DL (ref 6–8.2)
PROT SERPL-MCNC: 8.3 G/DL (ref 6–8.2)
PROT SERPL-MCNC: 8.4 G/DL (ref 6–8.2)
PROT SERPL-MCNC: 8.4 G/DL (ref 6–8.2)
PROT SERPL-MCNC: 8.6 G/DL (ref 6–8.2)
PROT SERPL-MCNC: 8.6 G/DL (ref 6–8.2)
PROT SERPL-MCNC: 9.2 G/DL (ref 6–8.2)
PROT SERPL-MCNC: 9.8 G/DL (ref 6–8.2)
PROTHROMBIN TIME: 17.2 SEC (ref 12–14.6)
PROTHROMBIN TIME: 17.5 SEC (ref 12–14.6)
RBC # BLD AUTO: 2.41 M/UL (ref 4.2–5.4)
RBC # BLD AUTO: 2.59 M/UL (ref 4.2–5.4)
RBC # BLD AUTO: 2.62 M/UL (ref 4.2–5.4)
RBC # BLD AUTO: 2.74 M/UL (ref 4.2–5.4)
RBC # BLD AUTO: 2.78 M/UL (ref 4.2–5.4)
RBC # BLD AUTO: 2.81 M/UL (ref 4.2–5.4)
RBC # BLD AUTO: 2.87 M/UL (ref 4.2–5.4)
RBC # BLD AUTO: 2.9 M/UL (ref 4.2–5.4)
RBC # BLD AUTO: 3.01 M/UL (ref 4.2–5.4)
RBC # BLD AUTO: 3.02 M/UL (ref 4.2–5.4)
RBC # BLD AUTO: 3.07 M/UL (ref 4.2–5.4)
RBC # BLD AUTO: 3.09 M/UL (ref 4.2–5.4)
RBC # BLD AUTO: 3.12 M/UL (ref 4.2–5.4)
RBC # BLD AUTO: 3.16 M/UL (ref 4.2–5.4)
RBC # BLD AUTO: 3.45 M/UL (ref 4.2–5.4)
RBC # BLD AUTO: 3.47 M/UL (ref 4.2–5.4)
RBC # BLD AUTO: 3.5 M/UL (ref 4.2–5.4)
RBC # BLD AUTO: 3.52 M/UL (ref 4.2–5.4)
RBC # BLD AUTO: 3.53 M/UL (ref 4.2–5.4)
RBC # BLD AUTO: 3.58 M/UL (ref 4.2–5.4)
RBC # BLD AUTO: 3.65 M/UL (ref 4.2–5.4)
RBC # BLD AUTO: 3.75 M/UL (ref 4.2–5.4)
RBC # BLD AUTO: 3.75 M/UL (ref 4.2–5.4)
RBC # BLD AUTO: 3.89 M/UL (ref 4.2–5.4)
RBC # BLD AUTO: 3.9 M/UL (ref 4.2–5.4)
RBC # BLD AUTO: 3.91 M/UL (ref 4.2–5.4)
RBC # BLD AUTO: 4.05 M/UL (ref 4.2–5.4)
RBC # BLD AUTO: 4.07 M/UL (ref 4.2–5.4)
RBC # BLD AUTO: 4.09 M/UL (ref 4.2–5.4)
RBC # BLD AUTO: 4.17 M/UL (ref 4.2–5.4)
RBC # BLD AUTO: 4.32 M/UL (ref 4.2–5.4)
RBC # BLD AUTO: 5.07 M/UL (ref 4.2–5.4)
RBC BLD AUTO: PRESENT
RETINYL PALMITATE SERPL-MCNC: <0.02 MG/L (ref 0–0.1)
RH BLD: NORMAL
SIGNIFICANT IND 70042: ABNORMAL
SIGNIFICANT IND 70042: ABNORMAL
SIGNIFICANT IND 70042: NORMAL
SIGNIFICANT IND 70042: NORMAL
SITE SITE: ABNORMAL
SITE SITE: ABNORMAL
SITE SITE: NORMAL
SITE SITE: NORMAL
SODIUM SERPL-SCNC: 113 MMOL/L (ref 135–145)
SODIUM SERPL-SCNC: 115 MMOL/L (ref 135–145)
SODIUM SERPL-SCNC: 116 MMOL/L (ref 135–145)
SODIUM SERPL-SCNC: 117 MMOL/L (ref 135–145)
SODIUM SERPL-SCNC: 118 MMOL/L (ref 135–145)
SODIUM SERPL-SCNC: 119 MMOL/L (ref 135–145)
SODIUM SERPL-SCNC: 119 MMOL/L (ref 135–145)
SODIUM SERPL-SCNC: 120 MMOL/L (ref 135–145)
SODIUM SERPL-SCNC: 120 MMOL/L (ref 135–145)
SODIUM SERPL-SCNC: 121 MMOL/L (ref 135–145)
SODIUM SERPL-SCNC: 123 MMOL/L (ref 135–145)
SODIUM SERPL-SCNC: 124 MMOL/L (ref 135–145)
SODIUM SERPL-SCNC: 124 MMOL/L (ref 135–145)
SODIUM SERPL-SCNC: 125 MMOL/L (ref 135–145)
SODIUM SERPL-SCNC: 125 MMOL/L (ref 135–145)
SODIUM SERPL-SCNC: 126 MMOL/L (ref 135–145)
SODIUM SERPL-SCNC: 127 MMOL/L (ref 135–145)
SODIUM SERPL-SCNC: 128 MMOL/L (ref 135–145)
SODIUM SERPL-SCNC: 128 MMOL/L (ref 135–145)
SODIUM SERPL-SCNC: 129 MMOL/L (ref 135–145)
SODIUM SERPL-SCNC: 130 MMOL/L (ref 135–145)
SODIUM SERPL-SCNC: 131 MMOL/L (ref 135–145)
SODIUM SERPL-SCNC: 132 MMOL/L (ref 135–145)
SODIUM SERPL-SCNC: 133 MMOL/L (ref 135–145)
SODIUM SERPL-SCNC: 134 MMOL/L (ref 135–145)
SOURCE SOURCE: ABNORMAL
SOURCE SOURCE: ABNORMAL
SOURCE SOURCE: NORMAL
SOURCE SOURCE: NORMAL
T4 FREE SERPL-MCNC: 0.9 NG/DL (ref 0.93–1.7)
T4 FREE SERPL-MCNC: 0.93 NG/DL (ref 0.93–1.7)
TARGETS BLD QL SMEAR: NORMAL
TARGETS BLD QL SMEAR: NORMAL
TEG ALGORITHM TGALG: ABNORMAL
TEG ALGORITHM TGALG: ABNORMAL
TIBC SERPL-MCNC: ABNORMAL UG/DL (ref 250–450)
TRIGL SERPL-MCNC: 120 MG/DL (ref 0–149)
TRIGL SERPL-MCNC: 46 MG/DL (ref 0–149)
TROPONIN T SERPL-MCNC: 862 NG/L (ref 6–19)
TROPONIN T SERPL-MCNC: 917 NG/L (ref 6–19)
TSH SERPL DL<=0.005 MIU/L-ACNC: 9.13 UIU/ML (ref 0.38–5.33)
UIBC SERPL-MCNC: <17 UG/DL (ref 110–370)
UNIT STATUS USTAT: NORMAL
VIT A SERPL-MCNC: 0.12 MG/L (ref 0.3–1.2)
VIT B12 SERPL-MCNC: 652 PG/ML (ref 211–911)
WBC # BLD AUTO: 1.5 K/UL (ref 4.8–10.8)
WBC # BLD AUTO: 13.5 K/UL (ref 4.8–10.8)
WBC # BLD AUTO: 2 K/UL (ref 4.8–10.8)
WBC # BLD AUTO: 2.1 K/UL (ref 4.8–10.8)
WBC # BLD AUTO: 2.3 K/UL (ref 4.8–10.8)
WBC # BLD AUTO: 2.3 K/UL (ref 4.8–10.8)
WBC # BLD AUTO: 2.4 K/UL (ref 4.8–10.8)
WBC # BLD AUTO: 2.5 K/UL (ref 4.8–10.8)
WBC # BLD AUTO: 2.6 K/UL (ref 4.8–10.8)
WBC # BLD AUTO: 2.8 K/UL (ref 4.8–10.8)
WBC # BLD AUTO: 3.2 K/UL (ref 4.8–10.8)
WBC # BLD AUTO: 3.2 K/UL (ref 4.8–10.8)
WBC # BLD AUTO: 3.6 K/UL (ref 4.8–10.8)
WBC # BLD AUTO: 3.7 K/UL (ref 4.8–10.8)
WBC # BLD AUTO: 3.8 K/UL (ref 4.8–10.8)
WBC # BLD AUTO: 3.9 K/UL (ref 4.8–10.8)
WBC # BLD AUTO: 3.9 K/UL (ref 4.8–10.8)
WBC # BLD AUTO: 4.1 K/UL (ref 4.8–10.8)
WBC # BLD AUTO: 4.2 K/UL (ref 4.8–10.8)
WBC # BLD AUTO: 4.3 K/UL (ref 4.8–10.8)
WBC # BLD AUTO: 4.4 K/UL (ref 4.8–10.8)
WBC # BLD AUTO: 4.5 K/UL (ref 4.8–10.8)
WBC # BLD AUTO: 4.6 K/UL (ref 4.8–10.8)
WBC # BLD AUTO: 4.6 K/UL (ref 4.8–10.8)
WBC # BLD AUTO: 4.7 K/UL (ref 4.8–10.8)
WBC # BLD AUTO: 4.7 K/UL (ref 4.8–10.8)
WBC # BLD AUTO: 4.8 K/UL (ref 4.8–10.8)
WBC # BLD AUTO: 5.2 K/UL (ref 4.8–10.8)
WBC # BLD AUTO: 5.5 K/UL (ref 4.8–10.8)
WBC # BLD AUTO: 5.5 K/UL (ref 4.8–10.8)
WBC # BLD AUTO: 6 K/UL (ref 4.8–10.8)
WBC # BLD AUTO: 8.1 K/UL (ref 4.8–10.8)
WBC # BLD AUTO: 8.2 K/UL (ref 4.8–10.8)
WBC # BLD AUTO: 9.6 K/UL (ref 4.8–10.8)
ZINC SERPL-MCNC: 40.1 UG/DL (ref 60–120)

## 2023-01-01 PROCEDURE — 80053 COMPREHEN METABOLIC PANEL: CPT

## 2023-01-01 PROCEDURE — 36415 COLL VENOUS BLD VENIPUNCTURE: CPT

## 2023-01-01 PROCEDURE — 99222 1ST HOSP IP/OBS MODERATE 55: CPT | Performed by: INTERNAL MEDICINE

## 2023-01-01 PROCEDURE — A9270 NON-COVERED ITEM OR SERVICE: HCPCS | Performed by: STUDENT IN AN ORGANIZED HEALTH CARE EDUCATION/TRAINING PROGRAM

## 2023-01-01 PROCEDURE — 85007 BL SMEAR W/DIFF WBC COUNT: CPT

## 2023-01-01 PROCEDURE — 700111 HCHG RX REV CODE 636 W/ 250 OVERRIDE (IP): Performed by: STUDENT IN AN ORGANIZED HEALTH CARE EDUCATION/TRAINING PROGRAM

## 2023-01-01 PROCEDURE — 770020 HCHG ROOM/CARE - TELE (206)

## 2023-01-01 PROCEDURE — 700105 HCHG RX REV CODE 258: Performed by: STUDENT IN AN ORGANIZED HEALTH CARE EDUCATION/TRAINING PROGRAM

## 2023-01-01 PROCEDURE — A9270 NON-COVERED ITEM OR SERVICE: HCPCS

## 2023-01-01 PROCEDURE — 700101 HCHG RX REV CODE 250: Performed by: INTERNAL MEDICINE

## 2023-01-01 PROCEDURE — 80048 BASIC METABOLIC PNL TOTAL CA: CPT

## 2023-01-01 PROCEDURE — 700101 HCHG RX REV CODE 250: Performed by: HOSPITALIST

## 2023-01-01 PROCEDURE — 700111 HCHG RX REV CODE 636 W/ 250 OVERRIDE (IP): Performed by: INTERNAL MEDICINE

## 2023-01-01 PROCEDURE — 84460 ALANINE AMINO (ALT) (SGPT): CPT

## 2023-01-01 PROCEDURE — 700111 HCHG RX REV CODE 636 W/ 250 OVERRIDE (IP)

## 2023-01-01 PROCEDURE — 36430 TRANSFUSION BLD/BLD COMPNT: CPT

## 2023-01-01 PROCEDURE — A9270 NON-COVERED ITEM OR SERVICE: HCPCS | Performed by: INTERNAL MEDICINE

## 2023-01-01 PROCEDURE — 700105 HCHG RX REV CODE 258

## 2023-01-01 PROCEDURE — 99233 SBSQ HOSP IP/OBS HIGH 50: CPT | Mod: GC | Performed by: INTERNAL MEDICINE

## 2023-01-01 PROCEDURE — 86901 BLOOD TYPING SEROLOGIC RH(D): CPT

## 2023-01-01 PROCEDURE — 700105 HCHG RX REV CODE 258: Performed by: INTERNAL MEDICINE

## 2023-01-01 PROCEDURE — 700102 HCHG RX REV CODE 250 W/ 637 OVERRIDE(OP): Performed by: HOSPITALIST

## 2023-01-01 PROCEDURE — 82962 GLUCOSE BLOOD TEST: CPT | Mod: 91

## 2023-01-01 PROCEDURE — 86900 BLOOD TYPING SEROLOGIC ABO: CPT

## 2023-01-01 PROCEDURE — 90935 HEMODIALYSIS ONE EVALUATION: CPT | Performed by: INTERNAL MEDICINE

## 2023-01-01 PROCEDURE — P9016 RBC LEUKOCYTES REDUCED: HCPCS

## 2023-01-01 PROCEDURE — 83735 ASSAY OF MAGNESIUM: CPT

## 2023-01-01 PROCEDURE — 700101 HCHG RX REV CODE 250: Performed by: STUDENT IN AN ORGANIZED HEALTH CARE EDUCATION/TRAINING PROGRAM

## 2023-01-01 PROCEDURE — 93005 ELECTROCARDIOGRAM TRACING: CPT

## 2023-01-01 PROCEDURE — 700105 HCHG RX REV CODE 258: Mod: UD | Performed by: HOSPITALIST

## 2023-01-01 PROCEDURE — 99233 SBSQ HOSP IP/OBS HIGH 50: CPT | Performed by: STUDENT IN AN ORGANIZED HEALTH CARE EDUCATION/TRAINING PROGRAM

## 2023-01-01 PROCEDURE — 85730 THROMBOPLASTIN TIME PARTIAL: CPT

## 2023-01-01 PROCEDURE — 84100 ASSAY OF PHOSPHORUS: CPT

## 2023-01-01 PROCEDURE — 80074 ACUTE HEPATITIS PANEL: CPT

## 2023-01-01 PROCEDURE — 700111 HCHG RX REV CODE 636 W/ 250 OVERRIDE (IP): Mod: JZ

## 2023-01-01 PROCEDURE — A9270 NON-COVERED ITEM OR SERVICE: HCPCS | Performed by: HOSPITALIST

## 2023-01-01 PROCEDURE — 99232 SBSQ HOSP IP/OBS MODERATE 35: CPT | Mod: GC | Performed by: INTERNAL MEDICINE

## 2023-01-01 PROCEDURE — 86902 BLOOD TYPE ANTIGEN DONOR EA: CPT | Mod: 91

## 2023-01-01 PROCEDURE — 90935 HEMODIALYSIS ONE EVALUATION: CPT

## 2023-01-01 PROCEDURE — 86850 RBC ANTIBODY SCREEN: CPT

## 2023-01-01 PROCEDURE — 82962 GLUCOSE BLOOD TEST: CPT

## 2023-01-01 PROCEDURE — 96375 TX/PRO/DX INJ NEW DRUG ADDON: CPT

## 2023-01-01 PROCEDURE — 87186 SC STD MICRODIL/AGAR DIL: CPT

## 2023-01-01 PROCEDURE — 99418 PROLNG IP/OBS E/M EA 15 MIN: CPT | Performed by: INTERNAL MEDICINE

## 2023-01-01 PROCEDURE — 700101 HCHG RX REV CODE 250

## 2023-01-01 PROCEDURE — 96374 THER/PROPH/DIAG INJ IV PUSH: CPT

## 2023-01-01 PROCEDURE — 71260 CT THORAX DX C+: CPT

## 2023-01-01 PROCEDURE — 303747 HCHG EXTRA SUTURE

## 2023-01-01 PROCEDURE — 700105 HCHG RX REV CODE 258: Performed by: HOSPITALIST

## 2023-01-01 PROCEDURE — 85055 RETICULATED PLATELET ASSAY: CPT | Mod: 91

## 2023-01-01 PROCEDURE — 36907 BALO ANGIOP CTR DIALYSIS SEG: CPT | Performed by: SURGERY

## 2023-01-01 PROCEDURE — 700111 HCHG RX REV CODE 636 W/ 250 OVERRIDE (IP): Performed by: NURSE PRACTITIONER

## 2023-01-01 PROCEDURE — 700102 HCHG RX REV CODE 250 W/ 637 OVERRIDE(OP): Performed by: INTERNAL MEDICINE

## 2023-01-01 PROCEDURE — 96376 TX/PRO/DX INJ SAME DRUG ADON: CPT

## 2023-01-01 PROCEDURE — 82330 ASSAY OF CALCIUM: CPT

## 2023-01-01 PROCEDURE — 5A1D70Z PERFORMANCE OF URINARY FILTRATION, INTERMITTENT, LESS THAN 6 HOURS PER DAY: ICD-10-PCS | Performed by: INTERNAL MEDICINE

## 2023-01-01 PROCEDURE — 90791 PSYCH DIAGNOSTIC EVALUATION: CPT | Performed by: SOCIAL WORKER

## 2023-01-01 PROCEDURE — 700102 HCHG RX REV CODE 250 W/ 637 OVERRIDE(OP)

## 2023-01-01 PROCEDURE — RXMED WILLOW AMBULATORY MEDICATION CHARGE

## 2023-01-01 PROCEDURE — 700111 HCHG RX REV CODE 636 W/ 250 OVERRIDE (IP): Mod: UD | Performed by: EMERGENCY MEDICINE

## 2023-01-01 PROCEDURE — 770000 HCHG ROOM/CARE - INTERMEDIATE ICU *

## 2023-01-01 PROCEDURE — 85055 RETICULATED PLATELET ASSAY: CPT

## 2023-01-01 PROCEDURE — 82533 TOTAL CORTISOL: CPT

## 2023-01-01 PROCEDURE — 85025 COMPLETE CBC W/AUTO DIFF WBC: CPT

## 2023-01-01 PROCEDURE — 700111 HCHG RX REV CODE 636 W/ 250 OVERRIDE (IP): Mod: UD | Performed by: HOSPITALIST

## 2023-01-01 PROCEDURE — 84134 ASSAY OF PREALBUMIN: CPT

## 2023-01-01 PROCEDURE — 99232 SBSQ HOSP IP/OBS MODERATE 35: CPT | Mod: FS | Performed by: STUDENT IN AN ORGANIZED HEALTH CARE EDUCATION/TRAINING PROGRAM

## 2023-01-01 PROCEDURE — 700117 HCHG RX CONTRAST REV CODE 255: Performed by: SURGERY

## 2023-01-01 PROCEDURE — 93306 TTE W/DOPPLER COMPLETE: CPT

## 2023-01-01 PROCEDURE — 96365 THER/PROPH/DIAG IV INF INIT: CPT

## 2023-01-01 PROCEDURE — 99233 SBSQ HOSP IP/OBS HIGH 50: CPT | Performed by: INTERNAL MEDICINE

## 2023-01-01 PROCEDURE — A9270 NON-COVERED ITEM OR SERVICE: HCPCS | Mod: UD | Performed by: HOSPITALIST

## 2023-01-01 PROCEDURE — 99497 ADVNCD CARE PLAN 30 MIN: CPT | Performed by: INTERNAL MEDICINE

## 2023-01-01 PROCEDURE — C9113 INJ PANTOPRAZOLE SODIUM, VIA: HCPCS | Performed by: INTERNAL MEDICINE

## 2023-01-01 PROCEDURE — 85014 HEMATOCRIT: CPT

## 2023-01-01 PROCEDURE — 83605 ASSAY OF LACTIC ACID: CPT

## 2023-01-01 PROCEDURE — 86945 BLOOD PRODUCT/IRRADIATION: CPT

## 2023-01-01 PROCEDURE — 83540 ASSAY OF IRON: CPT

## 2023-01-01 PROCEDURE — 85027 COMPLETE CBC AUTOMATED: CPT

## 2023-01-01 PROCEDURE — 99254 IP/OBS CNSLTJ NEW/EST MOD 60: CPT | Performed by: STUDENT IN AN ORGANIZED HEALTH CARE EDUCATION/TRAINING PROGRAM

## 2023-01-01 PROCEDURE — 99254 IP/OBS CNSLTJ NEW/EST MOD 60: CPT | Mod: 25 | Performed by: SURGERY

## 2023-01-01 PROCEDURE — 700111 HCHG RX REV CODE 636 W/ 250 OVERRIDE (IP): Mod: JZ | Performed by: STUDENT IN AN ORGANIZED HEALTH CARE EDUCATION/TRAINING PROGRAM

## 2023-01-01 PROCEDURE — 86140 C-REACTIVE PROTEIN: CPT

## 2023-01-01 PROCEDURE — 700102 HCHG RX REV CODE 250 W/ 637 OVERRIDE(OP): Mod: UD | Performed by: EMERGENCY MEDICINE

## 2023-01-01 PROCEDURE — 700117 HCHG RX CONTRAST REV CODE 255: Performed by: STUDENT IN AN ORGANIZED HEALTH CARE EDUCATION/TRAINING PROGRAM

## 2023-01-01 PROCEDURE — 36556 INSERT NON-TUNNEL CV CATH: CPT | Mod: RT | Performed by: SURGERY

## 2023-01-01 PROCEDURE — B51W1ZZ FLUOROSCOPY OF DIALYSIS SHUNT/FISTULA USING LOW OSMOLAR CONTRAST: ICD-10-PCS | Performed by: SURGERY

## 2023-01-01 PROCEDURE — 306780 HCHG STAT FOR TRANSFUSION PER CASE

## 2023-01-01 PROCEDURE — 700102 HCHG RX REV CODE 250 W/ 637 OVERRIDE(OP): Mod: UD | Performed by: HOSPITALIST

## 2023-01-01 PROCEDURE — 80048 BASIC METABOLIC PNL TOTAL CA: CPT | Mod: 91

## 2023-01-01 PROCEDURE — 700102 HCHG RX REV CODE 250 W/ 637 OVERRIDE(OP): Performed by: STUDENT IN AN ORGANIZED HEALTH CARE EDUCATION/TRAINING PROGRAM

## 2023-01-01 PROCEDURE — 36569 INSJ PICC 5 YR+ W/O IMAGING: CPT

## 2023-01-01 PROCEDURE — 87040 BLOOD CULTURE FOR BACTERIA: CPT

## 2023-01-01 PROCEDURE — 82607 VITAMIN B-12: CPT

## 2023-01-01 PROCEDURE — 99232 SBSQ HOSP IP/OBS MODERATE 35: CPT | Performed by: INTERNAL MEDICINE

## 2023-01-01 PROCEDURE — 84703 CHORIONIC GONADOTROPIN ASSAY: CPT

## 2023-01-01 PROCEDURE — 86922 COMPATIBILITY TEST ANTIGLOB: CPT | Mod: 91

## 2023-01-01 PROCEDURE — 99498 ADVNCD CARE PLAN ADDL 30 MIN: CPT | Performed by: STUDENT IN AN ORGANIZED HEALTH CARE EDUCATION/TRAINING PROGRAM

## 2023-01-01 PROCEDURE — 90832 PSYTX W PT 30 MINUTES: CPT | Performed by: SOCIAL WORKER

## 2023-01-01 PROCEDURE — 99239 HOSP IP/OBS DSCHRG MGMT >30: CPT | Mod: GC | Performed by: INTERNAL MEDICINE

## 2023-01-01 PROCEDURE — 99231 SBSQ HOSP IP/OBS SF/LOW 25: CPT | Performed by: STUDENT IN AN ORGANIZED HEALTH CARE EDUCATION/TRAINING PROGRAM

## 2023-01-01 PROCEDURE — 92523 SPEECH SOUND LANG COMPREHEN: CPT

## 2023-01-01 PROCEDURE — 80069 RENAL FUNCTION PANEL: CPT

## 2023-01-01 PROCEDURE — 99285 EMERGENCY DEPT VISIT HI MDM: CPT

## 2023-01-01 PROCEDURE — 82024 ASSAY OF ACTH: CPT

## 2023-01-01 PROCEDURE — 84630 ASSAY OF ZINC: CPT

## 2023-01-01 PROCEDURE — 85347 COAGULATION TIME ACTIVATED: CPT

## 2023-01-01 PROCEDURE — 84590 ASSAY OF VITAMIN A: CPT

## 2023-01-01 PROCEDURE — 82746 ASSAY OF FOLIC ACID SERUM: CPT

## 2023-01-01 PROCEDURE — 99497 ADVNCD CARE PLAN 30 MIN: CPT | Performed by: STUDENT IN AN ORGANIZED HEALTH CARE EDUCATION/TRAINING PROGRAM

## 2023-01-01 PROCEDURE — 770001 HCHG ROOM/CARE - MED/SURG/GYN PRIV*

## 2023-01-01 PROCEDURE — 97535 SELF CARE MNGMENT TRAINING: CPT

## 2023-01-01 PROCEDURE — 99255 IP/OBS CONSLTJ NEW/EST HI 80: CPT | Performed by: INTERNAL MEDICINE

## 2023-01-01 PROCEDURE — 99223 1ST HOSP IP/OBS HIGH 75: CPT | Mod: GC | Performed by: INTERNAL MEDICINE

## 2023-01-01 PROCEDURE — 057Y3ZZ DILATION OF UPPER VEIN, PERCUTANEOUS APPROACH: ICD-10-PCS | Performed by: SURGERY

## 2023-01-01 PROCEDURE — 86256 FLUORESCENT ANTIBODY TITER: CPT

## 2023-01-01 PROCEDURE — 700105 HCHG RX REV CODE 258: Mod: UD

## 2023-01-01 PROCEDURE — 85018 HEMOGLOBIN: CPT

## 2023-01-01 PROCEDURE — 05733DZ DILATION OF RIGHT INNOMINATE VEIN WITH INTRALUMINAL DEVICE, PERCUTANEOUS APPROACH: ICD-10-PCS | Performed by: SURGERY

## 2023-01-01 PROCEDURE — 99233 SBSQ HOSP IP/OBS HIGH 50: CPT | Mod: 25 | Performed by: INTERNAL MEDICINE

## 2023-01-01 PROCEDURE — 86922 COMPATIBILITY TEST ANTIGLOB: CPT

## 2023-01-01 PROCEDURE — 83550 IRON BINDING TEST: CPT

## 2023-01-01 PROCEDURE — 99284 EMERGENCY DEPT VISIT MOD MDM: CPT

## 2023-01-01 PROCEDURE — 700111 HCHG RX REV CODE 636 W/ 250 OVERRIDE (IP): Performed by: HOSPITALIST

## 2023-01-01 PROCEDURE — 85384 FIBRINOGEN ACTIVITY: CPT

## 2023-01-01 PROCEDURE — 700111 HCHG RX REV CODE 636 W/ 250 OVERRIDE (IP): Mod: JZ | Performed by: HOSPITALIST

## 2023-01-01 PROCEDURE — 99233 SBSQ HOSP IP/OBS HIGH 50: CPT | Performed by: HOSPITALIST

## 2023-01-01 PROCEDURE — 96366 THER/PROPH/DIAG IV INF ADDON: CPT

## 2023-01-01 PROCEDURE — 93005 ELECTROCARDIOGRAM TRACING: CPT | Performed by: INTERNAL MEDICINE

## 2023-01-01 PROCEDURE — 85610 PROTHROMBIN TIME: CPT

## 2023-01-01 PROCEDURE — 86225 DNA ANTIBODY NATIVE: CPT

## 2023-01-01 PROCEDURE — 85576 BLOOD PLATELET AGGREGATION: CPT | Mod: 91

## 2023-01-01 PROCEDURE — 99223 1ST HOSP IP/OBS HIGH 75: CPT | Performed by: INTERNAL MEDICINE

## 2023-01-01 PROCEDURE — 700111 HCHG RX REV CODE 636 W/ 250 OVERRIDE (IP): Mod: JZ,UD

## 2023-01-01 PROCEDURE — 96375 TX/PRO/DX INJ NEW DRUG ADDON: CPT | Mod: XU

## 2023-01-01 PROCEDURE — 86706 HEP B SURFACE ANTIBODY: CPT

## 2023-01-01 PROCEDURE — 700111 HCHG RX REV CODE 636 W/ 250 OVERRIDE (IP): Mod: JZ,UD | Performed by: EMERGENCY MEDICINE

## 2023-01-01 PROCEDURE — 82652 VIT D 1 25-DIHYDROXY: CPT

## 2023-01-01 PROCEDURE — 87077 CULTURE AEROBIC IDENTIFY: CPT | Mod: 91

## 2023-01-01 PROCEDURE — 99291 CRITICAL CARE FIRST HOUR: CPT | Performed by: HOSPITALIST

## 2023-01-01 PROCEDURE — 82728 ASSAY OF FERRITIN: CPT

## 2023-01-01 PROCEDURE — 99291 CRITICAL CARE FIRST HOUR: CPT | Mod: GC | Performed by: INTERNAL MEDICINE

## 2023-01-01 PROCEDURE — 84484 ASSAY OF TROPONIN QUANT: CPT

## 2023-01-01 PROCEDURE — G0378 HOSPITAL OBSERVATION PER HR: HCPCS

## 2023-01-01 PROCEDURE — 93010 ELECTROCARDIOGRAM REPORT: CPT | Performed by: INTERNAL MEDICINE

## 2023-01-01 PROCEDURE — 93005 ELECTROCARDIOGRAM TRACING: CPT | Performed by: EMERGENCY MEDICINE

## 2023-01-01 PROCEDURE — 84478 ASSAY OF TRIGLYCERIDES: CPT

## 2023-01-01 PROCEDURE — 84681 ASSAY OF C-PEPTIDE: CPT

## 2023-01-01 PROCEDURE — 83525 ASSAY OF INSULIN: CPT

## 2023-01-01 PROCEDURE — 05HY33Z INSERTION OF INFUSION DEVICE INTO UPPER VEIN, PERCUTANEOUS APPROACH: ICD-10-PCS | Performed by: SURGERY

## 2023-01-01 PROCEDURE — 84439 ASSAY OF FREE THYROXINE: CPT

## 2023-01-01 PROCEDURE — 93306 TTE W/DOPPLER COMPLETE: CPT | Mod: 26 | Performed by: INTERNAL MEDICINE

## 2023-01-01 PROCEDURE — 700111 HCHG RX REV CODE 636 W/ 250 OVERRIDE (IP): Performed by: SURGERY

## 2023-01-01 PROCEDURE — C1751 CATH, INF, PER/CENT/MIDLINE: HCPCS

## 2023-01-01 PROCEDURE — 99232 SBSQ HOSP IP/OBS MODERATE 35: CPT | Performed by: STUDENT IN AN ORGANIZED HEALTH CARE EDUCATION/TRAINING PROGRAM

## 2023-01-01 PROCEDURE — 99252 IP/OBS CONSLTJ NEW/EST SF 35: CPT | Performed by: SURGERY

## 2023-01-01 PROCEDURE — 84597 ASSAY OF VITAMIN K: CPT

## 2023-01-01 PROCEDURE — 99222 1ST HOSP IP/OBS MODERATE 55: CPT | Performed by: HOSPITALIST

## 2023-01-01 PROCEDURE — 99232 SBSQ HOSP IP/OBS MODERATE 35: CPT | Mod: 25,GC | Performed by: INTERNAL MEDICINE

## 2023-01-01 PROCEDURE — C1725 CATH, TRANSLUMIN NON-LASER: HCPCS

## 2023-01-01 PROCEDURE — A9270 NON-COVERED ITEM OR SERVICE: HCPCS | Mod: UD | Performed by: EMERGENCY MEDICINE

## 2023-01-01 PROCEDURE — 99153 MOD SED SAME PHYS/QHP EA: CPT

## 2023-01-01 PROCEDURE — 700111 HCHG RX REV CODE 636 W/ 250 OVERRIDE (IP): Mod: UD | Performed by: INTERNAL MEDICINE

## 2023-01-01 PROCEDURE — 74018 RADEX ABDOMEN 1 VIEW: CPT

## 2023-01-01 PROCEDURE — 83690 ASSAY OF LIPASE: CPT

## 2023-01-01 PROCEDURE — 30233N1 TRANSFUSION OF NONAUTOLOGOUS RED BLOOD CELLS INTO PERIPHERAL VEIN, PERCUTANEOUS APPROACH: ICD-10-PCS | Performed by: INTERNAL MEDICINE

## 2023-01-01 PROCEDURE — 82525 ASSAY OF COPPER: CPT

## 2023-01-01 PROCEDURE — 86160 COMPLEMENT ANTIGEN: CPT | Mod: 91

## 2023-01-01 PROCEDURE — 85576 BLOOD PLATELET AGGREGATION: CPT

## 2023-01-01 PROCEDURE — 36556 INSERT NON-TUNNEL CV CATH: CPT

## 2023-01-01 PROCEDURE — 84145 PROCALCITONIN (PCT): CPT

## 2023-01-01 PROCEDURE — 84446 ASSAY OF VITAMIN E: CPT

## 2023-01-01 PROCEDURE — 80061 LIPID PANEL: CPT

## 2023-01-01 PROCEDURE — 94760 N-INVAS EAR/PLS OXIMETRY 1: CPT

## 2023-01-01 PROCEDURE — 93010 ELECTROCARDIOGRAM REPORT: CPT | Mod: 76 | Performed by: INTERNAL MEDICINE

## 2023-01-01 PROCEDURE — 36901 INTRO CATH DIALYSIS CIRCUIT: CPT | Performed by: SURGERY

## 2023-01-01 PROCEDURE — 84443 ASSAY THYROID STIM HORMONE: CPT

## 2023-01-01 PROCEDURE — 85652 RBC SED RATE AUTOMATED: CPT

## 2023-01-01 PROCEDURE — 84450 TRANSFERASE (AST) (SGOT): CPT

## 2023-01-01 RX ORDER — DIPHENHYDRAMINE HYDROCHLORIDE 50 MG/ML
25 INJECTION INTRAMUSCULAR; INTRAVENOUS ONCE
Status: COMPLETED | OUTPATIENT
Start: 2023-01-01 | End: 2023-01-01

## 2023-01-01 RX ORDER — SODIUM CHLORIDE 9 MG/ML
INJECTION, SOLUTION INTRAVENOUS CONTINUOUS
Status: CANCELLED | OUTPATIENT
Start: 2023-01-01

## 2023-01-01 RX ORDER — PREDNISONE 5 MG/1
5 TABLET ORAL DAILY
Status: DISCONTINUED | OUTPATIENT
Start: 2023-01-01 | End: 2023-01-01 | Stop reason: HOSPADM

## 2023-01-01 RX ORDER — ZINC SULFATE 50(220)MG
220 CAPSULE ORAL DAILY
Status: DISCONTINUED | OUTPATIENT
Start: 2023-01-01 | End: 2023-01-01

## 2023-01-01 RX ORDER — ACETAMINOPHEN 325 MG/1
650 TABLET ORAL PRN
Status: CANCELLED | OUTPATIENT
Start: 2023-01-01

## 2023-01-01 RX ORDER — ACYCLOVIR 200 MG/1
200 CAPSULE ORAL 2 TIMES DAILY
Status: DISCONTINUED | OUTPATIENT
Start: 2023-01-01 | End: 2023-01-01 | Stop reason: HOSPADM

## 2023-01-01 RX ORDER — 0.9 % SODIUM CHLORIDE 0.9 %
10 VIAL (ML) INJECTION PRN
Status: CANCELLED | OUTPATIENT
Start: 2023-01-01

## 2023-01-01 RX ORDER — MAGNESIUM SULFATE HEPTAHYDRATE 40 MG/ML
2 INJECTION, SOLUTION INTRAVENOUS ONCE
Status: COMPLETED | OUTPATIENT
Start: 2023-01-01 | End: 2023-01-01

## 2023-01-01 RX ORDER — DIPHENHYDRAMINE HYDROCHLORIDE 50 MG/ML
25 INJECTION INTRAMUSCULAR; INTRAVENOUS PRN
Status: CANCELLED
Start: 2023-01-01

## 2023-01-01 RX ORDER — 0.9 % SODIUM CHLORIDE 0.9 %
VIAL (ML) INJECTION PRN
Status: CANCELLED | OUTPATIENT
Start: 2023-01-01

## 2023-01-01 RX ORDER — DIPHENHYDRAMINE HYDROCHLORIDE 50 MG/ML
25 INJECTION INTRAMUSCULAR; INTRAVENOUS EVERY 6 HOURS PRN
Status: DISCONTINUED | OUTPATIENT
Start: 2023-01-01 | End: 2023-01-01

## 2023-01-01 RX ORDER — LORAZEPAM 2 MG/ML
0.5 INJECTION INTRAMUSCULAR ONCE
Status: COMPLETED | OUTPATIENT
Start: 2023-01-01 | End: 2023-01-01

## 2023-01-01 RX ORDER — ACETAMINOPHEN 650 MG/1
650 SUPPOSITORY RECTAL EVERY 4 HOURS PRN
Status: DISCONTINUED | OUTPATIENT
Start: 2023-01-01 | End: 2023-01-01

## 2023-01-01 RX ORDER — METHYLPREDNISOLONE SODIUM SUCCINATE 125 MG/2ML
125 INJECTION, POWDER, LYOPHILIZED, FOR SOLUTION INTRAMUSCULAR; INTRAVENOUS ONCE
Status: CANCELLED
Start: 2023-01-01 | End: 2023-01-01

## 2023-01-01 RX ORDER — M-VIT,TX,IRON,MINS/CALC/FOLIC 27MG-0.4MG
1 TABLET ORAL DAILY
Status: DISCONTINUED | OUTPATIENT
Start: 2023-01-01 | End: 2023-01-01 | Stop reason: HOSPADM

## 2023-01-01 RX ORDER — LOSARTAN POTASSIUM 25 MG/1
25 TABLET ORAL DAILY
Status: DISCONTINUED | OUTPATIENT
Start: 2023-01-01 | End: 2023-01-01

## 2023-01-01 RX ORDER — HYDROMORPHONE HYDROCHLORIDE 2 MG/1
2 TABLET ORAL EVERY 6 HOURS PRN
Status: DISCONTINUED | OUTPATIENT
Start: 2023-01-01 | End: 2023-01-01

## 2023-01-01 RX ORDER — ONDANSETRON 2 MG/ML
4 INJECTION INTRAMUSCULAR; INTRAVENOUS ONCE
Status: COMPLETED | OUTPATIENT
Start: 2023-01-01 | End: 2023-01-01

## 2023-01-01 RX ORDER — DEXTROSE MONOHYDRATE 100 MG/ML
INJECTION, SOLUTION INTRAVENOUS
Status: COMPLETED
Start: 2023-01-01 | End: 2023-01-01

## 2023-01-01 RX ORDER — DIPHENHYDRAMINE HYDROCHLORIDE 50 MG/ML
25 INJECTION INTRAMUSCULAR; INTRAVENOUS PRN
Status: CANCELLED | OUTPATIENT
Start: 2023-01-01

## 2023-01-01 RX ORDER — DEXAMETHASONE SODIUM PHOSPHATE 4 MG/ML
0.75 INJECTION, SOLUTION INTRA-ARTICULAR; INTRALESIONAL; INTRAMUSCULAR; INTRAVENOUS; SOFT TISSUE DAILY
Status: DISCONTINUED | OUTPATIENT
Start: 2023-01-01 | End: 2023-01-01

## 2023-01-01 RX ORDER — LORAZEPAM 2 MG/ML
0.5 INJECTION INTRAMUSCULAR ONCE
Status: DISCONTINUED | OUTPATIENT
Start: 2023-01-01 | End: 2023-01-01

## 2023-01-01 RX ORDER — OMEPRAZOLE 20 MG/1
20 CAPSULE, DELAYED RELEASE ORAL DAILY
Status: DISCONTINUED | OUTPATIENT
Start: 2023-01-01 | End: 2023-01-01

## 2023-01-01 RX ORDER — BISACODYL 10 MG
10 SUPPOSITORY, RECTAL RECTAL DAILY
Status: DISCONTINUED | OUTPATIENT
Start: 2023-01-01 | End: 2023-01-01

## 2023-01-01 RX ORDER — MYCOPHENOLATE MOFETIL 200 MG/ML
500 POWDER, FOR SUSPENSION ORAL EVERY MORNING
Status: DISCONTINUED | OUTPATIENT
Start: 2023-01-01 | End: 2023-01-01

## 2023-01-01 RX ORDER — NALOXONE HYDROCHLORIDE 4 MG/.1ML
SPRAY NASAL
COMMUNITY
Start: 2022-01-01 | End: 2023-01-01

## 2023-01-01 RX ORDER — PROMETHAZINE HYDROCHLORIDE 25 MG/1
25 TABLET ORAL EVERY 6 HOURS PRN
Status: DISCONTINUED | OUTPATIENT
Start: 2023-01-01 | End: 2023-01-01 | Stop reason: HOSPADM

## 2023-01-01 RX ORDER — CARVEDILOL 3.12 MG/1
3.12 TABLET ORAL 2 TIMES DAILY WITH MEALS
Status: DISCONTINUED | OUTPATIENT
Start: 2023-01-01 | End: 2023-01-01

## 2023-01-01 RX ORDER — HYDROCORTISONE 10 MG/1
5 TABLET ORAL 2 TIMES DAILY
Status: DISCONTINUED | OUTPATIENT
Start: 2023-01-01 | End: 2023-01-01

## 2023-01-01 RX ORDER — AMLODIPINE BESYLATE 10 MG/1
10 TABLET ORAL EVERY MORNING
COMMUNITY
Start: 2022-01-01 | End: 2023-01-01

## 2023-01-01 RX ORDER — ONDANSETRON 4 MG/1
4 TABLET, ORALLY DISINTEGRATING ORAL EVERY 4 HOURS PRN
Qty: 30 TABLET | Refills: 0 | Status: SHIPPED | OUTPATIENT
Start: 2023-01-01

## 2023-01-01 RX ORDER — BISACODYL 10 MG
10 SUPPOSITORY, RECTAL RECTAL
Status: DISCONTINUED | OUTPATIENT
Start: 2023-01-01 | End: 2023-01-01

## 2023-01-01 RX ORDER — LANSOPRAZOLE 30 MG/1
30 TABLET, ORALLY DISINTEGRATING, DELAYED RELEASE ORAL DAILY
Status: DISCONTINUED | OUTPATIENT
Start: 2023-01-01 | End: 2023-01-01

## 2023-01-01 RX ORDER — POLYETHYLENE GLYCOL 3350 17 G/17G
1 POWDER, FOR SOLUTION ORAL
Status: DISCONTINUED | OUTPATIENT
Start: 2023-01-01 | End: 2023-01-01

## 2023-01-01 RX ORDER — MYCOPHENOLATE MOFETIL 200 MG/ML
500 POWDER, FOR SUSPENSION ORAL EVERY MORNING
Status: DISCONTINUED | OUTPATIENT
Start: 2023-01-01 | End: 2023-01-01 | Stop reason: HOSPADM

## 2023-01-01 RX ORDER — DIPHENHYDRAMINE HYDROCHLORIDE 50 MG/ML
25 INJECTION INTRAMUSCULAR; INTRAVENOUS EVERY 6 HOURS PRN
Status: DISCONTINUED | OUTPATIENT
Start: 2023-01-01 | End: 2023-01-01 | Stop reason: HOSPADM

## 2023-01-01 RX ORDER — HEPARIN SODIUM (PORCINE) LOCK FLUSH IV SOLN 100 UNIT/ML 100 UNIT/ML
500 SOLUTION INTRAVENOUS PRN
Status: CANCELLED | OUTPATIENT
Start: 2023-01-01

## 2023-01-01 RX ORDER — GAUZE BANDAGE 2" X 2"
100 BANDAGE TOPICAL DAILY
Status: DISCONTINUED | OUTPATIENT
Start: 2023-01-01 | End: 2023-01-01 | Stop reason: HOSPADM

## 2023-01-01 RX ORDER — HYDROMORPHONE HYDROCHLORIDE 1 MG/ML
0.5 INJECTION, SOLUTION INTRAMUSCULAR; INTRAVENOUS; SUBCUTANEOUS ONCE
Status: COMPLETED | OUTPATIENT
Start: 2023-01-01 | End: 2023-01-01

## 2023-01-01 RX ORDER — ACETAMINOPHEN 325 MG/1
650 TABLET ORAL EVERY 6 HOURS PRN
Status: DISCONTINUED | OUTPATIENT
Start: 2023-01-01 | End: 2023-01-01

## 2023-01-01 RX ORDER — CLONIDINE 0.2 MG/24H
1 PATCH, EXTENDED RELEASE TRANSDERMAL
COMMUNITY

## 2023-01-01 RX ORDER — OLANZAPINE 5 MG/1
2.5 TABLET ORAL EVERY EVENING
Status: DISCONTINUED | OUTPATIENT
Start: 2023-01-01 | End: 2023-01-01

## 2023-01-01 RX ORDER — HYDROMORPHONE HYDROCHLORIDE 1 MG/ML
0.5 INJECTION, SOLUTION INTRAMUSCULAR; INTRAVENOUS; SUBCUTANEOUS EVERY 6 HOURS PRN
Status: DISCONTINUED | OUTPATIENT
Start: 2023-01-01 | End: 2023-01-01

## 2023-01-01 RX ORDER — ESCITALOPRAM OXALATE 10 MG/1
10 TABLET ORAL DAILY
Status: DISCONTINUED | OUTPATIENT
Start: 2023-01-01 | End: 2023-01-01 | Stop reason: HOSPADM

## 2023-01-01 RX ORDER — ONDANSETRON 4 MG/1
4 TABLET, ORALLY DISINTEGRATING ORAL EVERY 4 HOURS PRN
Status: DISCONTINUED | OUTPATIENT
Start: 2023-01-01 | End: 2023-01-01

## 2023-01-01 RX ORDER — METOPROLOL SUCCINATE 25 MG/1
25 TABLET, EXTENDED RELEASE ORAL
Status: DISCONTINUED | OUTPATIENT
Start: 2023-01-01 | End: 2023-01-01

## 2023-01-01 RX ORDER — ACETAMINOPHEN 325 MG/1
650 TABLET ORAL ONCE
Status: CANCELLED | OUTPATIENT
Start: 2023-01-01

## 2023-01-01 RX ORDER — AMLODIPINE BESYLATE 5 MG/1
10 TABLET ORAL
Status: DISCONTINUED | OUTPATIENT
Start: 2023-01-01 | End: 2023-01-01

## 2023-01-01 RX ORDER — OXYCODONE HYDROCHLORIDE 5 MG/1
5 TABLET ORAL EVERY 4 HOURS PRN
Status: DISCONTINUED | OUTPATIENT
Start: 2023-01-01 | End: 2023-01-01 | Stop reason: HOSPADM

## 2023-01-01 RX ORDER — 0.9 % SODIUM CHLORIDE 0.9 %
3 VIAL (ML) INJECTION PRN
Status: CANCELLED | OUTPATIENT
Start: 2023-01-01

## 2023-01-01 RX ORDER — LIDOCAINE HYDROCHLORIDE 10 MG/ML
INJECTION, SOLUTION INFILTRATION; PERINEURAL
Status: COMPLETED
Start: 2023-01-01 | End: 2023-01-01

## 2023-01-01 RX ORDER — IODIXANOL 270 MG/ML
16 INJECTION, SOLUTION INTRAVASCULAR ONCE
Status: COMPLETED | OUTPATIENT
Start: 2023-01-01 | End: 2023-01-01

## 2023-01-01 RX ORDER — DIPHENHYDRAMINE HYDROCHLORIDE 50 MG/ML
50 INJECTION INTRAMUSCULAR; INTRAVENOUS ONCE
Status: COMPLETED | OUTPATIENT
Start: 2023-01-01 | End: 2023-01-01

## 2023-01-01 RX ORDER — LORAZEPAM 2 MG/ML
1 INJECTION INTRAMUSCULAR ONCE
Status: COMPLETED | OUTPATIENT
Start: 2023-01-01 | End: 2023-01-01

## 2023-01-01 RX ORDER — DEXTROSE MONOHYDRATE 100 MG/ML
INJECTION, SOLUTION INTRAVENOUS CONTINUOUS
Status: DISCONTINUED | OUTPATIENT
Start: 2023-01-01 | End: 2023-01-01

## 2023-01-01 RX ORDER — ZINC SULFATE 50(220)MG
220 CAPSULE ORAL DAILY
Qty: 40 CAPSULE | Refills: 0 | Status: SHIPPED | OUTPATIENT
Start: 2023-01-01

## 2023-01-01 RX ORDER — AMOXICILLIN 250 MG
2 CAPSULE ORAL 2 TIMES DAILY
Status: DISCONTINUED | OUTPATIENT
Start: 2023-01-01 | End: 2023-01-01 | Stop reason: HOSPADM

## 2023-01-01 RX ORDER — ACETAMINOPHEN 325 MG/1
650 TABLET ORAL ONCE
Status: DISCONTINUED | OUTPATIENT
Start: 2023-01-01 | End: 2023-01-01 | Stop reason: HOSPADM

## 2023-01-01 RX ORDER — FOLIC ACID 1 MG/1
1 TABLET ORAL DAILY
Status: DISCONTINUED | OUTPATIENT
Start: 2023-01-01 | End: 2023-01-01

## 2023-01-01 RX ORDER — ONDANSETRON 2 MG/ML
4 INJECTION INTRAMUSCULAR; INTRAVENOUS PRN
Status: ACTIVE | OUTPATIENT
Start: 2023-01-01 | End: 2023-01-01

## 2023-01-01 RX ORDER — LORAZEPAM 0.5 MG/1
0.5 TABLET ORAL EVERY 8 HOURS PRN
Status: DISCONTINUED | OUTPATIENT
Start: 2023-01-01 | End: 2023-01-01

## 2023-01-01 RX ORDER — DEXTROSE MONOHYDRATE 25 G/50ML
50 INJECTION, SOLUTION INTRAVENOUS ONCE
Status: COMPLETED | OUTPATIENT
Start: 2023-01-01 | End: 2023-01-01

## 2023-01-01 RX ORDER — PANTOPRAZOLE SODIUM 40 MG/10ML
40 INJECTION, POWDER, LYOPHILIZED, FOR SOLUTION INTRAVENOUS DAILY
Status: DISCONTINUED | OUTPATIENT
Start: 2023-01-01 | End: 2023-01-01

## 2023-01-01 RX ORDER — DEXTROSE 20 G/100ML
INJECTION, SOLUTION INTRAVENOUS CONTINUOUS
Status: DISCONTINUED | OUTPATIENT
Start: 2023-01-01 | End: 2023-01-01

## 2023-01-01 RX ORDER — COSYNTROPIN 0.25 MG/ML
250 INJECTION, POWDER, FOR SOLUTION INTRAMUSCULAR; INTRAVENOUS ONCE
Status: COMPLETED | OUTPATIENT
Start: 2023-01-01 | End: 2023-01-01

## 2023-01-01 RX ORDER — DRONABINOL 2.5 MG/1
2.5 CAPSULE ORAL
Status: DISCONTINUED | OUTPATIENT
Start: 2023-01-01 | End: 2023-01-01

## 2023-01-01 RX ORDER — HEPARIN SODIUM 1000 [USP'U]/ML
INJECTION, SOLUTION INTRAVENOUS; SUBCUTANEOUS
Status: COMPLETED
Start: 2023-01-01 | End: 2023-01-01

## 2023-01-01 RX ORDER — CARVEDILOL 6.25 MG/1
3.12 TABLET ORAL 2 TIMES DAILY WITH MEALS
Status: DISCONTINUED | OUTPATIENT
Start: 2023-01-01 | End: 2023-01-01 | Stop reason: HOSPADM

## 2023-01-01 RX ORDER — ATENOLOL 25 MG/1
25 TABLET ORAL
COMMUNITY
End: 2023-01-01

## 2023-01-01 RX ORDER — CARVEDILOL 3.12 MG/1
3.12 TABLET ORAL 2 TIMES DAILY WITH MEALS
Qty: 100 TABLET | Refills: 0 | Status: SHIPPED | OUTPATIENT
Start: 2023-01-01

## 2023-01-01 RX ORDER — METHYLPREDNISOLONE SODIUM SUCCINATE 125 MG/2ML
125 INJECTION, POWDER, LYOPHILIZED, FOR SOLUTION INTRAMUSCULAR; INTRAVENOUS ONCE
Status: COMPLETED | OUTPATIENT
Start: 2023-01-01 | End: 2023-01-01

## 2023-01-01 RX ORDER — DEXTROSE MONOHYDRATE 25 G/50ML
50 INJECTION, SOLUTION INTRAVENOUS PRN
Status: DISCONTINUED | OUTPATIENT
Start: 2023-01-01 | End: 2023-01-01 | Stop reason: HOSPADM

## 2023-01-01 RX ORDER — PREDNISONE 5 MG/1
5 TABLET ORAL DAILY
Status: DISCONTINUED | OUTPATIENT
Start: 2023-01-01 | End: 2023-01-01

## 2023-01-01 RX ORDER — FOLIC ACID 1 MG/1
1 TABLET ORAL DAILY
Qty: 60 TABLET | Refills: 0 | Status: SHIPPED | OUTPATIENT
Start: 2023-01-01

## 2023-01-01 RX ORDER — PROCHLORPERAZINE EDISYLATE 5 MG/ML
10 INJECTION INTRAMUSCULAR; INTRAVENOUS EVERY 4 HOURS PRN
Status: DISCONTINUED | OUTPATIENT
Start: 2023-01-01 | End: 2023-01-01 | Stop reason: HOSPADM

## 2023-01-01 RX ORDER — DEXTROSE MONOHYDRATE 100 MG/ML
INJECTION, SOLUTION INTRAVENOUS CONTINUOUS
Status: DISCONTINUED | OUTPATIENT
Start: 2023-01-01 | End: 2023-01-01 | Stop reason: DRUGHIGH

## 2023-01-01 RX ORDER — HYDROXYCHLOROQUINE SULFATE 200 MG/1
200 TABLET, FILM COATED ORAL EVERY MORNING
Status: DISCONTINUED | OUTPATIENT
Start: 2023-01-01 | End: 2023-01-01 | Stop reason: HOSPADM

## 2023-01-01 RX ORDER — METHYLPREDNISOLONE SODIUM SUCCINATE 125 MG/2ML
INJECTION, POWDER, LYOPHILIZED, FOR SOLUTION INTRAMUSCULAR; INTRAVENOUS
Status: ACTIVE
Start: 2023-01-01 | End: 2023-01-01

## 2023-01-01 RX ORDER — SODIUM CHLORIDE 9 MG/ML
INJECTION, SOLUTION INTRAVENOUS CONTINUOUS
Status: ACTIVE | OUTPATIENT
Start: 2023-01-01 | End: 2023-01-01

## 2023-01-01 RX ORDER — LORAZEPAM 2 MG/ML
0.5 INJECTION INTRAMUSCULAR EVERY 6 HOURS PRN
Status: DISCONTINUED | OUTPATIENT
Start: 2023-01-01 | End: 2023-01-01 | Stop reason: HOSPADM

## 2023-01-01 RX ORDER — DIPHENHYDRAMINE HYDROCHLORIDE 50 MG/ML
25 INJECTION INTRAMUSCULAR; INTRAVENOUS PRN
Status: COMPLETED | OUTPATIENT
Start: 2023-01-01 | End: 2023-01-01

## 2023-01-01 RX ORDER — DIPHENHYDRAMINE HYDROCHLORIDE 50 MG/ML
25 INJECTION INTRAMUSCULAR; INTRAVENOUS ONCE
Status: DISCONTINUED | OUTPATIENT
Start: 2023-01-01 | End: 2023-01-01

## 2023-01-01 RX ORDER — OXYMETAZOLINE HYDROCHLORIDE 0.05 G/100ML
2 SPRAY NASAL 2 TIMES DAILY
Status: DISCONTINUED | OUTPATIENT
Start: 2023-01-01 | End: 2023-01-01

## 2023-01-01 RX ORDER — HEPARIN SODIUM 5000 [USP'U]/ML
INJECTION, SOLUTION INTRAVENOUS; SUBCUTANEOUS
Status: DISPENSED
Start: 2023-01-01 | End: 2023-01-01

## 2023-01-01 RX ORDER — HYDROXYCHLOROQUINE SULFATE 200 MG/1
200 TABLET, FILM COATED ORAL EVERY MORNING
Status: DISCONTINUED | OUTPATIENT
Start: 2023-01-01 | End: 2023-01-01

## 2023-01-01 RX ORDER — MORPHINE SULFATE 4 MG/ML
4 INJECTION INTRAVENOUS ONCE
Status: COMPLETED | OUTPATIENT
Start: 2023-01-01 | End: 2023-01-01

## 2023-01-01 RX ORDER — LORAZEPAM 2 MG/ML
0.5 INJECTION INTRAMUSCULAR EVERY 4 HOURS PRN
Status: DISCONTINUED | OUTPATIENT
Start: 2023-01-01 | End: 2023-01-01 | Stop reason: HOSPADM

## 2023-01-01 RX ORDER — ECHINACEA PURPUREA EXTRACT 125 MG
2 TABLET ORAL
Status: DISCONTINUED | OUTPATIENT
Start: 2023-01-01 | End: 2023-01-01 | Stop reason: HOSPADM

## 2023-01-01 RX ORDER — HYDROMORPHONE HYDROCHLORIDE 2 MG/1
2 TABLET ORAL EVERY 8 HOURS PRN
COMMUNITY
Start: 2022-01-01

## 2023-01-01 RX ORDER — SODIUM CHLORIDE 1 G/1
1 TABLET ORAL
Status: DISCONTINUED | OUTPATIENT
Start: 2023-01-01 | End: 2023-01-01

## 2023-01-01 RX ORDER — ESCITALOPRAM OXALATE 10 MG/1
20 TABLET ORAL DAILY
Status: DISCONTINUED | OUTPATIENT
Start: 2023-01-01 | End: 2023-01-01

## 2023-01-01 RX ORDER — PANTOPRAZOLE SODIUM 40 MG/1
40 TABLET, DELAYED RELEASE ORAL 2 TIMES DAILY
Status: DISCONTINUED | OUTPATIENT
Start: 2023-01-01 | End: 2023-01-01

## 2023-01-01 RX ORDER — ONDANSETRON 4 MG/1
4 TABLET, ORALLY DISINTEGRATING ORAL EVERY 4 HOURS PRN
Status: DISCONTINUED | OUTPATIENT
Start: 2023-01-01 | End: 2023-01-01 | Stop reason: HOSPADM

## 2023-01-01 RX ORDER — ACETAMINOPHEN 325 MG/1
650 TABLET ORAL EVERY 4 HOURS PRN
Status: DISCONTINUED | OUTPATIENT
Start: 2023-01-01 | End: 2023-01-01

## 2023-01-01 RX ORDER — CLONIDINE 0.2 MG/24H
PATCH, EXTENDED RELEASE TRANSDERMAL
Qty: 4 PATCH | Refills: 0 | Status: SHIPPED | OUTPATIENT
Start: 2023-01-01 | End: 2023-01-01

## 2023-01-01 RX ORDER — ESCITALOPRAM OXALATE 10 MG/1
10 TABLET ORAL DAILY
Qty: 30 TABLET | Refills: 0 | Status: SHIPPED | OUTPATIENT
Start: 2023-01-01

## 2023-01-01 RX ORDER — OXYCODONE HYDROCHLORIDE 5 MG/1
5 TABLET ORAL EVERY 4 HOURS PRN
Status: DISCONTINUED | OUTPATIENT
Start: 2023-01-01 | End: 2023-01-01

## 2023-01-01 RX ORDER — HYDROMORPHONE HYDROCHLORIDE 2 MG/1
2 TABLET ORAL EVERY 6 HOURS PRN
Status: DISCONTINUED | OUTPATIENT
Start: 2023-01-01 | End: 2023-01-01 | Stop reason: HOSPADM

## 2023-01-01 RX ORDER — MAGNESIUM SULFATE HEPTAHYDRATE 40 MG/ML
4 INJECTION, SOLUTION INTRAVENOUS ONCE
Status: COMPLETED | OUTPATIENT
Start: 2023-01-01 | End: 2023-01-01

## 2023-01-01 RX ORDER — DIPHENHYDRAMINE HYDROCHLORIDE 50 MG/ML
INJECTION INTRAMUSCULAR; INTRAVENOUS
Status: COMPLETED
Start: 2023-01-01 | End: 2023-01-01

## 2023-01-01 RX ORDER — LOSARTAN POTASSIUM 25 MG/1
25 TABLET ORAL DAILY
Status: DISCONTINUED | OUTPATIENT
Start: 2023-01-01 | End: 2023-01-01 | Stop reason: HOSPADM

## 2023-01-01 RX ORDER — CARVEDILOL 3.12 MG/1
3.12 TABLET ORAL 2 TIMES DAILY WITH MEALS
Status: DISCONTINUED | OUTPATIENT
Start: 2023-01-01 | End: 2023-01-01 | Stop reason: HOSPADM

## 2023-01-01 RX ORDER — DEXTROSE AND SODIUM CHLORIDE 5; .9 G/100ML; G/100ML
INJECTION, SOLUTION INTRAVENOUS CONTINUOUS
Status: DISCONTINUED | OUTPATIENT
Start: 2023-01-01 | End: 2023-01-01

## 2023-01-01 RX ORDER — LORAZEPAM 1 MG/1
0.5 TABLET ORAL 2 TIMES DAILY
Status: DISCONTINUED | OUTPATIENT
Start: 2023-01-01 | End: 2023-01-01 | Stop reason: HOSPADM

## 2023-01-01 RX ORDER — OMEPRAZOLE 20 MG/1
20 CAPSULE, DELAYED RELEASE ORAL 2 TIMES DAILY
Status: DISCONTINUED | OUTPATIENT
Start: 2023-01-01 | End: 2023-01-01

## 2023-01-01 RX ORDER — LOSARTAN POTASSIUM 50 MG/1
25 TABLET ORAL
Status: DISCONTINUED | OUTPATIENT
Start: 2023-01-01 | End: 2023-01-01 | Stop reason: HOSPADM

## 2023-01-01 RX ORDER — AMOXICILLIN 250 MG
2 CAPSULE ORAL 2 TIMES DAILY
Status: DISCONTINUED | OUTPATIENT
Start: 2023-01-01 | End: 2023-01-01

## 2023-01-01 RX ORDER — ESCITALOPRAM OXALATE 10 MG/1
10 TABLET ORAL DAILY
Status: DISCONTINUED | OUTPATIENT
Start: 2023-01-01 | End: 2023-01-01

## 2023-01-01 RX ORDER — OLANZAPINE 5 MG/1
2.5 TABLET ORAL EVERY EVENING
Status: DISCONTINUED | OUTPATIENT
Start: 2023-01-01 | End: 2023-01-01 | Stop reason: HOSPADM

## 2023-01-01 RX ORDER — ONDANSETRON 2 MG/ML
4 INJECTION INTRAMUSCULAR; INTRAVENOUS EVERY 4 HOURS PRN
Status: DISCONTINUED | OUTPATIENT
Start: 2023-01-01 | End: 2023-01-01 | Stop reason: HOSPADM

## 2023-01-01 RX ORDER — PROTAMINE SULFATE 10 MG/ML
INJECTION, SOLUTION INTRAVENOUS
Status: COMPLETED
Start: 2023-01-01 | End: 2023-01-01

## 2023-01-01 RX ORDER — TRAZODONE HYDROCHLORIDE 100 MG/1
100 TABLET ORAL ONCE
Status: ACTIVE | OUTPATIENT
Start: 2023-01-01 | End: 2023-01-01

## 2023-01-01 RX ORDER — HYDROMORPHONE HYDROCHLORIDE 1 MG/ML
0.5 INJECTION, SOLUTION INTRAMUSCULAR; INTRAVENOUS; SUBCUTANEOUS 2 TIMES DAILY PRN
Status: DISCONTINUED | OUTPATIENT
Start: 2023-01-01 | End: 2023-01-01

## 2023-01-01 RX ORDER — MIRTAZAPINE 15 MG/1
15 TABLET, FILM COATED ORAL
Status: DISCONTINUED | OUTPATIENT
Start: 2023-01-01 | End: 2023-01-01

## 2023-01-01 RX ORDER — PREDNISONE 10 MG/1
5 TABLET ORAL DAILY
Status: DISCONTINUED | OUTPATIENT
Start: 2023-01-01 | End: 2023-01-01

## 2023-01-01 RX ORDER — HYDROMORPHONE HYDROCHLORIDE 2 MG/1
2 TABLET ORAL EVERY 8 HOURS PRN
Status: DISCONTINUED | OUTPATIENT
Start: 2023-01-01 | End: 2023-01-01

## 2023-01-01 RX ORDER — ACYCLOVIR 400 MG/1
400 TABLET ORAL
Status: DISPENSED | OUTPATIENT
Start: 2023-01-01 | End: 2023-01-01

## 2023-01-01 RX ORDER — LOSARTAN POTASSIUM 50 MG/1
25 TABLET ORAL
Status: DISCONTINUED | OUTPATIENT
Start: 2023-01-01 | End: 2023-01-01

## 2023-01-01 RX ORDER — ONDANSETRON 4 MG/1
TABLET, FILM COATED ORAL
COMMUNITY
Start: 2022-01-01 | End: 2023-01-01

## 2023-01-01 RX ORDER — MIDAZOLAM HYDROCHLORIDE 1 MG/ML
INJECTION INTRAMUSCULAR; INTRAVENOUS
Status: COMPLETED
Start: 2023-01-01 | End: 2023-01-01

## 2023-01-01 RX ORDER — FERROUS SULFATE 324(65)MG
TABLET, DELAYED RELEASE (ENTERIC COATED) ORAL
COMMUNITY
Start: 2022-01-01 | End: 2023-01-01

## 2023-01-01 RX ORDER — M-VIT,TX,IRON,MINS/CALC/FOLIC 27MG-0.4MG
1 TABLET ORAL DAILY
Status: DISCONTINUED | OUTPATIENT
Start: 2023-01-01 | End: 2023-01-01

## 2023-01-01 RX ORDER — PROTAMINE SULFATE 10 MG/ML
50 INJECTION, SOLUTION INTRAVENOUS ONCE
Status: COMPLETED | OUTPATIENT
Start: 2023-01-01 | End: 2023-01-01

## 2023-01-01 RX ORDER — LOSARTAN POTASSIUM 25 MG/1
25 TABLET ORAL DAILY
Qty: 50 TABLET | Refills: 0 | Status: SHIPPED | OUTPATIENT
Start: 2023-01-01

## 2023-01-01 RX ORDER — HYDROMORPHONE HYDROCHLORIDE 2 MG/1
2 TABLET ORAL ONCE
Status: COMPLETED | OUTPATIENT
Start: 2023-01-01 | End: 2023-01-01

## 2023-01-01 RX ORDER — DIPHENHYDRAMINE HCL 25 MG
50 TABLET ORAL EVERY 6 HOURS PRN
Status: DISCONTINUED | OUTPATIENT
Start: 2023-01-01 | End: 2023-01-01

## 2023-01-01 RX ORDER — PETROLATUM 42 G/100G
OINTMENT TOPICAL 3 TIMES DAILY PRN
Status: DISCONTINUED | OUTPATIENT
Start: 2023-01-01 | End: 2023-01-01 | Stop reason: HOSPADM

## 2023-01-01 RX ORDER — HYDROMORPHONE HYDROCHLORIDE 1 MG/ML
1 INJECTION, SOLUTION INTRAMUSCULAR; INTRAVENOUS; SUBCUTANEOUS EVERY 4 HOURS PRN
Status: DISCONTINUED | OUTPATIENT
Start: 2023-01-01 | End: 2023-01-01

## 2023-01-01 RX ORDER — HYDRALAZINE HYDROCHLORIDE 20 MG/ML
10 INJECTION INTRAMUSCULAR; INTRAVENOUS EVERY 6 HOURS PRN
Status: DISCONTINUED | OUTPATIENT
Start: 2023-01-01 | End: 2023-01-01 | Stop reason: HOSPADM

## 2023-01-01 RX ORDER — PROTAMINE SULFATE 10 MG/ML
20 INJECTION, SOLUTION INTRAVENOUS ONCE
Status: COMPLETED | OUTPATIENT
Start: 2023-01-01 | End: 2023-01-01

## 2023-01-01 RX ORDER — LORAZEPAM 1 MG/1
0.5 TABLET ORAL ONCE
Status: COMPLETED | OUTPATIENT
Start: 2023-01-01 | End: 2023-01-01

## 2023-01-01 RX ORDER — ONDANSETRON 4 MG/1
4 TABLET, ORALLY DISINTEGRATING ORAL ONCE
Status: COMPLETED | OUTPATIENT
Start: 2023-01-01 | End: 2023-01-01

## 2023-01-01 RX ORDER — MYCOPHENOLATE MOFETIL 200 MG/ML
500 POWDER, FOR SUSPENSION ORAL EVERY MORNING
Qty: 160 ML | Refills: 0 | Status: SHIPPED | OUTPATIENT
Start: 2023-01-01 | End: 2023-08-07

## 2023-01-01 RX ORDER — SODIUM CHLORIDE 9 MG/ML
500 INJECTION, SOLUTION INTRAVENOUS
Status: ACTIVE | OUTPATIENT
Start: 2023-01-01 | End: 2023-01-01

## 2023-01-01 RX ORDER — POLYETHYLENE GLYCOL 3350 17 G/17G
1 POWDER, FOR SOLUTION ORAL
Status: DISCONTINUED | OUTPATIENT
Start: 2023-01-01 | End: 2023-01-01 | Stop reason: HOSPADM

## 2023-01-01 RX ORDER — HEPARIN SODIUM 1000 [USP'U]/ML
2000 INJECTION, SOLUTION INTRAVENOUS; SUBCUTANEOUS ONCE
Status: COMPLETED | OUTPATIENT
Start: 2023-01-01 | End: 2023-01-01

## 2023-01-01 RX ORDER — MIDAZOLAM HYDROCHLORIDE 1 MG/ML
.5-2 INJECTION INTRAMUSCULAR; INTRAVENOUS PRN
Status: ACTIVE | OUTPATIENT
Start: 2023-01-01 | End: 2023-01-01

## 2023-01-01 RX ORDER — M-VIT,TX,IRON,MINS/CALC/FOLIC 27MG-0.4MG
1 TABLET ORAL DAILY
Qty: 30 TABLET | Refills: 11 | Status: SHIPPED | OUTPATIENT
Start: 2023-01-01

## 2023-01-01 RX ORDER — GAUZE BANDAGE 2" X 2"
100 BANDAGE TOPICAL DAILY
Status: DISCONTINUED | OUTPATIENT
Start: 2023-01-01 | End: 2023-01-01

## 2023-01-01 RX ORDER — CEFAZOLIN SODIUM 1 G/50ML
1 INJECTION, SOLUTION INTRAVENOUS EVERY 24 HOURS
Status: DISCONTINUED | OUTPATIENT
Start: 2023-01-01 | End: 2023-01-01 | Stop reason: HOSPADM

## 2023-01-01 RX ORDER — DEXTROSE MONOHYDRATE 25 G/50ML
50 INJECTION, SOLUTION INTRAVENOUS ONCE
Status: DISCONTINUED | OUTPATIENT
Start: 2023-01-01 | End: 2023-01-01

## 2023-01-01 RX ORDER — OMEPRAZOLE 40 MG/1
40 CAPSULE, DELAYED RELEASE ORAL DAILY
Qty: 50 CAPSULE | Refills: 0 | Status: SHIPPED | OUTPATIENT
Start: 2023-01-01

## 2023-01-01 RX ORDER — HYDRALAZINE HYDROCHLORIDE 20 MG/ML
10 INJECTION INTRAMUSCULAR; INTRAVENOUS EVERY 4 HOURS PRN
Status: DISCONTINUED | OUTPATIENT
Start: 2023-01-01 | End: 2023-01-01 | Stop reason: HOSPADM

## 2023-01-01 RX ORDER — BISACODYL 10 MG
10 SUPPOSITORY, RECTAL RECTAL ONCE
Status: ACTIVE | OUTPATIENT
Start: 2023-01-01 | End: 2023-01-01

## 2023-01-01 RX ORDER — SODIUM CHLORIDE 1 G/1
1 TABLET ORAL 2 TIMES DAILY
Status: DISCONTINUED | OUTPATIENT
Start: 2023-01-01 | End: 2023-01-01

## 2023-01-01 RX ORDER — FOLIC ACID 1 MG/1
1 TABLET ORAL DAILY
Status: DISCONTINUED | OUTPATIENT
Start: 2023-01-01 | End: 2023-01-01 | Stop reason: HOSPADM

## 2023-01-01 RX ORDER — MIRTAZAPINE 15 MG/1
30 TABLET, FILM COATED ORAL
Status: DISCONTINUED | OUTPATIENT
Start: 2023-01-01 | End: 2023-01-01

## 2023-01-01 RX ORDER — BISACODYL 10 MG
10 SUPPOSITORY, RECTAL RECTAL
Status: DISCONTINUED | OUTPATIENT
Start: 2023-01-01 | End: 2023-01-01 | Stop reason: HOSPADM

## 2023-01-01 RX ORDER — DIPHENHYDRAMINE HYDROCHLORIDE 50 MG/ML
25 INJECTION INTRAMUSCULAR; INTRAVENOUS 3 TIMES DAILY
Status: DISCONTINUED | OUTPATIENT
Start: 2023-01-01 | End: 2023-01-01

## 2023-01-01 RX ORDER — DEXTROSE MONOHYDRATE 100 MG/ML
INJECTION, SOLUTION INTRAVENOUS
Status: ACTIVE
Start: 2023-01-01 | End: 2023-01-01

## 2023-01-01 RX ORDER — ACYCLOVIR 800 MG/1
800 TABLET ORAL
Status: DISCONTINUED | OUTPATIENT
Start: 2023-01-01 | End: 2023-01-01

## 2023-01-01 RX ORDER — CLONIDINE 0.2 MG/24H
1 PATCH, EXTENDED RELEASE TRANSDERMAL
Status: DISCONTINUED | OUTPATIENT
Start: 2023-01-01 | End: 2023-01-01 | Stop reason: HOSPADM

## 2023-01-01 RX ORDER — LANSOPRAZOLE 30 MG/1
30 TABLET, ORALLY DISINTEGRATING, DELAYED RELEASE ORAL DAILY
Qty: 50 TABLET | Refills: 0 | Status: SHIPPED | OUTPATIENT
Start: 2023-01-01 | End: 2023-01-01

## 2023-01-01 RX ORDER — MYCOPHENOLATE MOFETIL 250 MG/1
500 CAPSULE ORAL EVERY MORNING
Status: DISCONTINUED | OUTPATIENT
Start: 2023-01-01 | End: 2023-01-01

## 2023-01-01 RX ORDER — HYDROXYZINE HYDROCHLORIDE 25 MG/1
25 TABLET, FILM COATED ORAL 3 TIMES DAILY PRN
Status: DISCONTINUED | OUTPATIENT
Start: 2023-01-01 | End: 2023-01-01

## 2023-01-01 RX ORDER — OLANZAPINE 2.5 MG/1
2.5 TABLET, FILM COATED ORAL EVERY EVENING
Qty: 30 TABLET | Refills: 0 | Status: SHIPPED | OUTPATIENT
Start: 2023-01-01

## 2023-01-01 RX ORDER — SODIUM CHLORIDE 9 MG/ML
250 INJECTION, SOLUTION INTRAVENOUS
Status: DISCONTINUED | OUTPATIENT
Start: 2023-01-01 | End: 2023-01-01 | Stop reason: HOSPADM

## 2023-01-01 RX ORDER — LANSOPRAZOLE 30 MG/1
30 TABLET, ORALLY DISINTEGRATING, DELAYED RELEASE ORAL DAILY
Status: DISCONTINUED | OUTPATIENT
Start: 2023-01-01 | End: 2023-01-01 | Stop reason: HOSPADM

## 2023-01-01 RX ORDER — ZINC SULFATE 50(220)MG
220 CAPSULE ORAL DAILY
Status: DISCONTINUED | OUTPATIENT
Start: 2023-01-01 | End: 2023-01-01 | Stop reason: HOSPADM

## 2023-01-01 RX ORDER — HYDROMORPHONE HYDROCHLORIDE 1 MG/ML
0.5 INJECTION, SOLUTION INTRAMUSCULAR; INTRAVENOUS; SUBCUTANEOUS ONCE
Status: DISPENSED | OUTPATIENT
Start: 2023-01-01 | End: 2023-01-01

## 2023-01-01 RX ORDER — LORAZEPAM 2 MG/ML
0.5 INJECTION INTRAMUSCULAR 3 TIMES DAILY
Status: DISCONTINUED | OUTPATIENT
Start: 2023-01-01 | End: 2023-01-01

## 2023-01-01 RX ORDER — MYCOPHENOLIC ACID 360 MG/1
360 TABLET, DELAYED RELEASE ORAL EVERY MORNING
Status: DISCONTINUED | OUTPATIENT
Start: 2023-01-01 | End: 2023-01-01

## 2023-01-01 RX ORDER — LANOLIN ALCOHOL/MO/W.PET/CERES
100 CREAM (GRAM) TOPICAL DAILY
Qty: 60 TABLET | Refills: 0 | Status: SHIPPED | OUTPATIENT
Start: 2023-01-01

## 2023-01-01 RX ORDER — HYDROMORPHONE HYDROCHLORIDE 1 MG/ML
1 INJECTION, SOLUTION INTRAMUSCULAR; INTRAVENOUS; SUBCUTANEOUS ONCE
Status: COMPLETED | OUTPATIENT
Start: 2023-01-01 | End: 2023-01-01

## 2023-01-01 RX ORDER — PREGABALIN 25 MG/1
25 CAPSULE ORAL 2 TIMES DAILY
Status: DISCONTINUED | OUTPATIENT
Start: 2023-01-01 | End: 2023-01-01

## 2023-01-01 RX ORDER — PANTOPRAZOLE SODIUM 40 MG/1
40 TABLET, DELAYED RELEASE ORAL 2 TIMES DAILY
Status: ON HOLD | COMMUNITY
Start: 2022-01-01 | End: 2023-01-01

## 2023-01-01 RX ORDER — LORAZEPAM 0.5 MG/1
0.5 TABLET ORAL EVERY 6 HOURS PRN
Status: DISCONTINUED | OUTPATIENT
Start: 2023-01-01 | End: 2023-01-01 | Stop reason: HOSPADM

## 2023-01-01 RX ORDER — LORAZEPAM 0.5 MG/1
0.5 TABLET ORAL EVERY 8 HOURS PRN
Qty: 15 TABLET | Refills: 0 | Status: SHIPPED | OUTPATIENT
Start: 2023-01-01 | End: 2023-07-28

## 2023-01-01 RX ORDER — HYDROMORPHONE HYDROCHLORIDE 1 MG/ML
0.5 INJECTION, SOLUTION INTRAMUSCULAR; INTRAVENOUS; SUBCUTANEOUS EVERY 4 HOURS PRN
Status: DISCONTINUED | OUTPATIENT
Start: 2023-01-01 | End: 2023-01-01 | Stop reason: HOSPADM

## 2023-01-01 RX ORDER — DIPHENHYDRAMINE HCL 25 MG
25 TABLET ORAL ONCE
Status: DISPENSED | OUTPATIENT
Start: 2023-01-01 | End: 2023-01-01

## 2023-01-01 RX ORDER — OMEPRAZOLE 20 MG/1
20 CAPSULE, DELAYED RELEASE ORAL DAILY
Status: DISCONTINUED | OUTPATIENT
Start: 2023-01-01 | End: 2023-01-01 | Stop reason: HOSPADM

## 2023-01-01 RX ORDER — ACETAMINOPHEN 500 MG
1000 TABLET ORAL 2 TIMES DAILY PRN
Status: DISCONTINUED | OUTPATIENT
Start: 2023-01-01 | End: 2023-01-01 | Stop reason: HOSPADM

## 2023-01-01 RX ORDER — LORAZEPAM 2 MG/ML
0.5 INJECTION INTRAMUSCULAR EVERY 6 HOURS PRN
Status: DISCONTINUED | OUTPATIENT
Start: 2023-01-01 | End: 2023-01-01

## 2023-01-01 RX ORDER — LIDOCAINE HYDROCHLORIDE 10 MG/ML
2 INJECTION, SOLUTION INFILTRATION; PERINEURAL ONCE
Status: COMPLETED | OUTPATIENT
Start: 2023-01-01 | End: 2023-01-01

## 2023-01-01 RX ADMIN — LOSARTAN POTASSIUM 25 MG: 25 TABLET, FILM COATED ORAL at 06:08

## 2023-01-01 RX ADMIN — ONDANSETRON 4 MG: 2 INJECTION INTRAMUSCULAR; INTRAVENOUS at 09:28

## 2023-01-01 RX ADMIN — ESCITALOPRAM OXALATE 10 MG: 10 TABLET ORAL at 10:49

## 2023-01-01 RX ADMIN — CARVEDILOL 3.12 MG: 3.12 TABLET, FILM COATED ORAL at 08:15

## 2023-01-01 RX ADMIN — LORAZEPAM 0.5 MG: 2 INJECTION INTRAMUSCULAR; INTRAVENOUS at 21:30

## 2023-01-01 RX ADMIN — SODIUM CHLORIDE: 4 INJECTION, SOLUTION, CONCENTRATE INTRAVENOUS at 09:53

## 2023-01-01 RX ADMIN — ACYCLOVIR 800 MG: 800 TABLET ORAL at 16:14

## 2023-01-01 RX ADMIN — ONDANSETRON 4 MG: 2 INJECTION INTRAMUSCULAR; INTRAVENOUS at 19:56

## 2023-01-01 RX ADMIN — LORAZEPAM 0.5 MG: 2 INJECTION INTRAMUSCULAR; INTRAVENOUS at 15:23

## 2023-01-01 RX ADMIN — HYDROMORPHONE HYDROCHLORIDE 1 MG: 1 INJECTION, SOLUTION INTRAMUSCULAR; INTRAVENOUS; SUBCUTANEOUS at 12:48

## 2023-01-01 RX ADMIN — ONDANSETRON 4 MG: 2 INJECTION INTRAMUSCULAR; INTRAVENOUS at 10:17

## 2023-01-01 RX ADMIN — SODIUM CHLORIDE 1 G: 1 TABLET ORAL at 05:19

## 2023-01-01 RX ADMIN — HYDROMORPHONE HYDROCHLORIDE 0.5 MG: 1 INJECTION, SOLUTION INTRAMUSCULAR; INTRAVENOUS; SUBCUTANEOUS at 05:12

## 2023-01-01 RX ADMIN — HYDROMORPHONE HYDROCHLORIDE 0.5 MG: 1 INJECTION, SOLUTION INTRAMUSCULAR; INTRAVENOUS; SUBCUTANEOUS at 08:13

## 2023-01-01 RX ADMIN — DIPHENHYDRAMINE HYDROCHLORIDE 25 MG: 50 INJECTION, SOLUTION INTRAMUSCULAR; INTRAVENOUS at 22:57

## 2023-01-01 RX ADMIN — OLANZAPINE 2.5 MG: 5 TABLET, FILM COATED ORAL at 18:05

## 2023-01-01 RX ADMIN — DEXTROSE MONOHYDRATE 25 G: 100 INJECTION, SOLUTION INTRAVENOUS at 19:13

## 2023-01-01 RX ADMIN — DIPHENHYDRAMINE HYDROCHLORIDE 50 MG: 25 TABLET ORAL at 12:25

## 2023-01-01 RX ADMIN — DEXTROSE MONOHYDRATE: 100 INJECTION, SOLUTION INTRAVENOUS at 07:35

## 2023-01-01 RX ADMIN — FENTANYL CITRATE 50 MCG: 50 INJECTION, SOLUTION INTRAMUSCULAR; INTRAVENOUS at 12:48

## 2023-01-01 RX ADMIN — OMEPRAZOLE 20 MG: 20 CAPSULE, DELAYED RELEASE ORAL at 09:58

## 2023-01-01 RX ADMIN — ONDANSETRON 4 MG: 2 INJECTION INTRAMUSCULAR; INTRAVENOUS at 04:20

## 2023-01-01 RX ADMIN — HYDROMORPHONE HYDROCHLORIDE 0.5 MG: 1 INJECTION, SOLUTION INTRAMUSCULAR; INTRAVENOUS; SUBCUTANEOUS at 02:06

## 2023-01-01 RX ADMIN — LORAZEPAM 0.5 MG: 2 INJECTION INTRAMUSCULAR; INTRAVENOUS at 22:42

## 2023-01-01 RX ADMIN — HYDROMORPHONE HYDROCHLORIDE 1 MG: 1 INJECTION, SOLUTION INTRAMUSCULAR; INTRAVENOUS; SUBCUTANEOUS at 10:17

## 2023-01-01 RX ADMIN — OMEPRAZOLE 20 MG: 20 CAPSULE, DELAYED RELEASE ORAL at 17:10

## 2023-01-01 RX ADMIN — LOSARTAN POTASSIUM 25 MG: 50 TABLET, FILM COATED ORAL at 05:12

## 2023-01-01 RX ADMIN — DEXTROSE MONOHYDRATE 25 G: 100 INJECTION, SOLUTION INTRAVENOUS at 22:05

## 2023-01-01 RX ADMIN — CARVEDILOL 3.12 MG: 3.12 TABLET, FILM COATED ORAL at 18:20

## 2023-01-01 RX ADMIN — OMEPRAZOLE 20 MG: 20 CAPSULE, DELAYED RELEASE ORAL at 21:23

## 2023-01-01 RX ADMIN — ONDANSETRON 4 MG: 2 INJECTION INTRAMUSCULAR; INTRAVENOUS at 08:36

## 2023-01-01 RX ADMIN — LORAZEPAM 0.5 MG: 2 INJECTION INTRAMUSCULAR; INTRAVENOUS at 08:42

## 2023-01-01 RX ADMIN — HYDROMORPHONE HYDROCHLORIDE 1 MG: 1 INJECTION, SOLUTION INTRAMUSCULAR; INTRAVENOUS; SUBCUTANEOUS at 13:36

## 2023-01-01 RX ADMIN — OMEPRAZOLE 20 MG: 20 CAPSULE, DELAYED RELEASE ORAL at 05:38

## 2023-01-01 RX ADMIN — DIPHENHYDRAMINE HYDROCHLORIDE 50 MG: 50 INJECTION, SOLUTION INTRAMUSCULAR; INTRAVENOUS at 10:36

## 2023-01-01 RX ADMIN — ONDANSETRON 4 MG: 2 INJECTION INTRAMUSCULAR; INTRAVENOUS at 13:08

## 2023-01-01 RX ADMIN — LORAZEPAM 0.5 MG: 2 INJECTION INTRAMUSCULAR; INTRAVENOUS at 05:48

## 2023-01-01 RX ADMIN — DEXTROSE MONOHYDRATE 25 G: 100 INJECTION, SOLUTION INTRAVENOUS at 19:52

## 2023-01-01 RX ADMIN — HYDROXYCHLOROQUINE SULFATE 200 MG: 200 TABLET ORAL at 09:59

## 2023-01-01 RX ADMIN — SODIUM CHLORIDE: 4 INJECTION, SOLUTION, CONCENTRATE INTRAVENOUS at 19:20

## 2023-01-01 RX ADMIN — DEXTROSE MONOHYDRATE: 100 INJECTION, SOLUTION INTRAVENOUS at 11:40

## 2023-01-01 RX ADMIN — ACYCLOVIR 800 MG: 800 TABLET ORAL at 13:58

## 2023-01-01 RX ADMIN — ONDANSETRON 4 MG: 2 INJECTION INTRAMUSCULAR; INTRAVENOUS at 04:15

## 2023-01-01 RX ADMIN — SODIUM CHLORIDE: 4 INJECTION, SOLUTION, CONCENTRATE INTRAVENOUS at 13:51

## 2023-01-01 RX ADMIN — HYDROMORPHONE HYDROCHLORIDE 0.5 MG: 1 INJECTION, SOLUTION INTRAMUSCULAR; INTRAVENOUS; SUBCUTANEOUS at 01:41

## 2023-01-01 RX ADMIN — CARVEDILOL 3.12 MG: 3.12 TABLET, FILM COATED ORAL at 17:07

## 2023-01-01 RX ADMIN — METOPROLOL TARTRATE 12.5 MG: 25 TABLET, FILM COATED ORAL at 05:21

## 2023-01-01 RX ADMIN — DEXTROSE MONOHYDRATE: 100 INJECTION, SOLUTION INTRAVENOUS at 00:52

## 2023-01-01 RX ADMIN — Medication 1 ML: at 13:20

## 2023-01-01 RX ADMIN — ESCITALOPRAM OXALATE 20 MG: 10 TABLET ORAL at 10:12

## 2023-01-01 RX ADMIN — ONDANSETRON 4 MG: 2 INJECTION INTRAMUSCULAR; INTRAVENOUS at 02:06

## 2023-01-01 RX ADMIN — SENNOSIDES AND DOCUSATE SODIUM 2 TABLET: 50; 8.6 TABLET ORAL at 18:07

## 2023-01-01 RX ADMIN — LORAZEPAM 0.5 MG: 2 INJECTION INTRAMUSCULAR; INTRAVENOUS at 00:22

## 2023-01-01 RX ADMIN — ACYCLOVIR 200 MG: 200 CAPSULE ORAL at 17:27

## 2023-01-01 RX ADMIN — ESCITALOPRAM OXALATE 10 MG: 10 TABLET ORAL at 05:32

## 2023-01-01 RX ADMIN — DEXTROSE MONOHYDRATE 25 G: 100 INJECTION, SOLUTION INTRAVENOUS at 09:01

## 2023-01-01 RX ADMIN — HYDROMORPHONE HYDROCHLORIDE 0.5 MG: 1 INJECTION, SOLUTION INTRAMUSCULAR; INTRAVENOUS; SUBCUTANEOUS at 01:26

## 2023-01-01 RX ADMIN — OMEPRAZOLE 20 MG: 20 CAPSULE, DELAYED RELEASE ORAL at 05:32

## 2023-01-01 RX ADMIN — DIPHENHYDRAMINE HYDROCHLORIDE 50 MG: 50 INJECTION, SOLUTION INTRAMUSCULAR; INTRAVENOUS at 08:49

## 2023-01-01 RX ADMIN — LORAZEPAM 0.5 MG: 2 INJECTION INTRAMUSCULAR; INTRAVENOUS at 03:13

## 2023-01-01 RX ADMIN — HYDROMORPHONE HYDROCHLORIDE 1 MG: 1 INJECTION, SOLUTION INTRAMUSCULAR; INTRAVENOUS; SUBCUTANEOUS at 03:16

## 2023-01-01 RX ADMIN — CALCIUM GLUCONATE: 98 INJECTION, SOLUTION INTRAVENOUS at 20:06

## 2023-01-01 RX ADMIN — LOSARTAN POTASSIUM 25 MG: 50 TABLET, FILM COATED ORAL at 06:21

## 2023-01-01 RX ADMIN — DEXTROSE MONOHYDRATE 50 ML: 25 INJECTION, SOLUTION INTRAVENOUS at 12:36

## 2023-01-01 RX ADMIN — LORAZEPAM 0.5 MG: 2 INJECTION INTRAMUSCULAR; INTRAVENOUS at 13:31

## 2023-01-01 RX ADMIN — ACYCLOVIR 800 MG: 800 TABLET ORAL at 16:22

## 2023-01-01 RX ADMIN — DIPHENHYDRAMINE HYDROCHLORIDE 50 MG: 50 INJECTION, SOLUTION INTRAMUSCULAR; INTRAVENOUS at 21:59

## 2023-01-01 RX ADMIN — OMEPRAZOLE 20 MG: 20 CAPSULE, DELAYED RELEASE ORAL at 17:50

## 2023-01-01 RX ADMIN — PREDNISONE 5 MG: 5 TABLET ORAL at 05:21

## 2023-01-01 RX ADMIN — OMEPRAZOLE 20 MG: 20 CAPSULE, DELAYED RELEASE ORAL at 04:15

## 2023-01-01 RX ADMIN — Medication 1 ML: at 11:23

## 2023-01-01 RX ADMIN — MYCOPHENOLATE MOFETIL 500 MG: 200 POWDER, FOR SUSPENSION ORAL at 10:53

## 2023-01-01 RX ADMIN — ONDANSETRON 4 MG: 2 INJECTION INTRAMUSCULAR; INTRAVENOUS at 20:21

## 2023-01-01 RX ADMIN — PIPERACILLIN AND TAZOBACTAM 4.5 G: 4; .5 INJECTION, POWDER, FOR SOLUTION INTRAVENOUS at 22:21

## 2023-01-01 RX ADMIN — MULTIPLE VITAMINS W/ MINERALS TAB 1 TABLET: TAB at 10:49

## 2023-01-01 RX ADMIN — FOLIC ACID 1 MG: 1 TABLET ORAL at 05:38

## 2023-01-01 RX ADMIN — FOLIC ACID 1 MG: 1 TABLET ORAL at 18:24

## 2023-01-01 RX ADMIN — MULTIPLE VITAMINS W/ MINERALS TAB 1 TABLET: TAB at 18:24

## 2023-01-01 RX ADMIN — FENTANYL CITRATE 25 MCG: 50 INJECTION, SOLUTION INTRAMUSCULAR; INTRAVENOUS at 12:56

## 2023-01-01 RX ADMIN — LORAZEPAM 0.5 MG: 2 INJECTION INTRAMUSCULAR; INTRAVENOUS at 10:05

## 2023-01-01 RX ADMIN — DIPHENHYDRAMINE HYDROCHLORIDE 25 MG: 50 INJECTION INTRAMUSCULAR; INTRAVENOUS at 11:06

## 2023-01-01 RX ADMIN — HYDROMORPHONE HYDROCHLORIDE 0.5 MG: 1 INJECTION, SOLUTION INTRAMUSCULAR; INTRAVENOUS; SUBCUTANEOUS at 22:30

## 2023-01-01 RX ADMIN — DEXTROSE MONOHYDRATE 25 G: 100 INJECTION, SOLUTION INTRAVENOUS at 18:49

## 2023-01-01 RX ADMIN — FOLIC ACID 1 MG: 1 TABLET ORAL at 10:49

## 2023-01-01 RX ADMIN — HYDROMORPHONE HYDROCHLORIDE 2 MG: 2 TABLET ORAL at 20:39

## 2023-01-01 RX ADMIN — MULTIPLE VITAMINS W/ MINERALS TAB 1 TABLET: TAB at 10:12

## 2023-01-01 RX ADMIN — LORAZEPAM 0.5 MG: 2 INJECTION INTRAMUSCULAR; INTRAVENOUS at 01:37

## 2023-01-01 RX ADMIN — SODIUM CHLORIDE: 4 INJECTION, SOLUTION, CONCENTRATE INTRAVENOUS at 22:37

## 2023-01-01 RX ADMIN — DIPHENHYDRAMINE HYDROCHLORIDE 25 MG: 50 INJECTION, SOLUTION INTRAMUSCULAR; INTRAVENOUS at 13:49

## 2023-01-01 RX ADMIN — DIPHENHYDRAMINE HYDROCHLORIDE 25 MG: 50 INJECTION, SOLUTION INTRAMUSCULAR; INTRAVENOUS at 23:08

## 2023-01-01 RX ADMIN — DIPHENHYDRAMINE HYDROCHLORIDE 25 MG: 50 INJECTION, SOLUTION INTRAMUSCULAR; INTRAVENOUS at 10:08

## 2023-01-01 RX ADMIN — HYDROMORPHONE HYDROCHLORIDE 2 MG: 2 TABLET ORAL at 14:42

## 2023-01-01 RX ADMIN — LOSARTAN POTASSIUM 25 MG: 25 TABLET, FILM COATED ORAL at 05:17

## 2023-01-01 RX ADMIN — MYCOPHENOLATE MOFETIL 500 MG: 200 POWDER, FOR SUSPENSION ORAL at 10:22

## 2023-01-01 RX ADMIN — LORAZEPAM 0.5 MG: 1 TABLET ORAL at 20:08

## 2023-01-01 RX ADMIN — DIPHENHYDRAMINE HYDROCHLORIDE 50 MG: 50 INJECTION, SOLUTION INTRAMUSCULAR; INTRAVENOUS at 15:30

## 2023-01-01 RX ADMIN — LORAZEPAM 0.5 MG: 2 INJECTION INTRAMUSCULAR; INTRAVENOUS at 20:44

## 2023-01-01 RX ADMIN — ONDANSETRON 4 MG: 2 INJECTION INTRAMUSCULAR; INTRAVENOUS at 14:16

## 2023-01-01 RX ADMIN — ZINC SULFATE: 25 INJECTION, SOLUTION INTRAVENOUS at 19:43

## 2023-01-01 RX ADMIN — OLANZAPINE 2.5 MG: 5 TABLET, FILM COATED ORAL at 17:18

## 2023-01-01 RX ADMIN — ONDANSETRON 4 MG: 2 INJECTION INTRAMUSCULAR; INTRAVENOUS at 08:15

## 2023-01-01 RX ADMIN — THIAMINE HYDROCHLORIDE 100 MG: 100 INJECTION, SOLUTION INTRAMUSCULAR; INTRAVENOUS at 10:39

## 2023-01-01 RX ADMIN — Medication 220 MG: at 09:04

## 2023-01-01 RX ADMIN — PREDNISONE 5 MG: 5 TABLET ORAL at 13:27

## 2023-01-01 RX ADMIN — DIPHENHYDRAMINE HYDROCHLORIDE 25 MG: 50 INJECTION, SOLUTION INTRAMUSCULAR; INTRAVENOUS at 12:59

## 2023-01-01 RX ADMIN — HYDROMORPHONE HYDROCHLORIDE 0.5 MG: 1 INJECTION, SOLUTION INTRAMUSCULAR; INTRAVENOUS; SUBCUTANEOUS at 14:01

## 2023-01-01 RX ADMIN — HYDROXYCHLOROQUINE SULFATE 200 MG: 200 TABLET ORAL at 05:19

## 2023-01-01 RX ADMIN — HYDROMORPHONE HYDROCHLORIDE 1 MG: 1 INJECTION, SOLUTION INTRAMUSCULAR; INTRAVENOUS; SUBCUTANEOUS at 09:20

## 2023-01-01 RX ADMIN — ONDANSETRON 4 MG: 2 INJECTION INTRAMUSCULAR; INTRAVENOUS at 01:37

## 2023-01-01 RX ADMIN — LORAZEPAM 0.5 MG: 2 INJECTION INTRAMUSCULAR; INTRAVENOUS at 18:15

## 2023-01-01 RX ADMIN — LOSARTAN POTASSIUM 25 MG: 25 TABLET, FILM COATED ORAL at 05:38

## 2023-01-01 RX ADMIN — ACYCLOVIR 800 MG: 800 TABLET ORAL at 08:18

## 2023-01-01 RX ADMIN — PIPERACILLIN AND TAZOBACTAM 4.5 G: 4; .5 INJECTION, POWDER, FOR SOLUTION INTRAVENOUS at 11:37

## 2023-01-01 RX ADMIN — DRONABINOL 2.5 MG: 2.5 CAPSULE ORAL at 18:23

## 2023-01-01 RX ADMIN — PREGABALIN 25 MG: 25 CAPSULE ORAL at 16:58

## 2023-01-01 RX ADMIN — HYDROMORPHONE HYDROCHLORIDE 1 MG: 1 INJECTION, SOLUTION INTRAMUSCULAR; INTRAVENOUS; SUBCUTANEOUS at 18:30

## 2023-01-01 RX ADMIN — DIPHENHYDRAMINE HYDROCHLORIDE 25 MG: 50 INJECTION, SOLUTION INTRAMUSCULAR; INTRAVENOUS at 17:20

## 2023-01-01 RX ADMIN — HYDROMORPHONE HYDROCHLORIDE 0.5 MG: 1 INJECTION, SOLUTION INTRAMUSCULAR; INTRAVENOUS; SUBCUTANEOUS at 04:16

## 2023-01-01 RX ADMIN — MULTIPLE VITAMINS W/ MINERALS TAB 1 TABLET: TAB at 05:20

## 2023-01-01 RX ADMIN — LORAZEPAM 0.5 MG: 2 INJECTION INTRAMUSCULAR; INTRAVENOUS at 02:22

## 2023-01-01 RX ADMIN — DEXTROSE MONOHYDRATE 25 G: 100 INJECTION, SOLUTION INTRAVENOUS at 10:10

## 2023-01-01 RX ADMIN — EPOETIN ALFA 10000 UNITS: 10000 SOLUTION INTRAVENOUS; SUBCUTANEOUS at 16:39

## 2023-01-01 RX ADMIN — ONDANSETRON 4 MG: 2 INJECTION INTRAMUSCULAR; INTRAVENOUS at 18:04

## 2023-01-01 RX ADMIN — OLANZAPINE 2.5 MG: 5 TABLET, FILM COATED ORAL at 18:24

## 2023-01-01 RX ADMIN — IODIXANOL 16 ML: 270 INJECTION, SOLUTION INTRAVASCULAR at 17:15

## 2023-01-01 RX ADMIN — DEXAMETHASONE SODIUM PHOSPHATE 0.76 MG: 4 INJECTION, SOLUTION INTRAMUSCULAR; INTRAVENOUS at 04:15

## 2023-01-01 RX ADMIN — CALCIUM GLUCONATE: 98 INJECTION, SOLUTION INTRAVENOUS at 19:56

## 2023-01-01 RX ADMIN — HYDROMORPHONE HYDROCHLORIDE 0.5 MG: 1 INJECTION, SOLUTION INTRAMUSCULAR; INTRAVENOUS; SUBCUTANEOUS at 16:44

## 2023-01-01 RX ADMIN — PREDNISONE 5 MG: 10 TABLET ORAL at 06:19

## 2023-01-01 RX ADMIN — HYDROXYCHLOROQUINE SULFATE 200 MG: 200 TABLET ORAL at 10:23

## 2023-01-01 RX ADMIN — HYDROMORPHONE HYDROCHLORIDE 0.5 MG: 1 INJECTION, SOLUTION INTRAMUSCULAR; INTRAVENOUS; SUBCUTANEOUS at 17:35

## 2023-01-01 RX ADMIN — OLANZAPINE 2.5 MG: 5 TABLET, FILM COATED ORAL at 18:20

## 2023-01-01 RX ADMIN — HYDROMORPHONE HYDROCHLORIDE 0.5 MG: 1 INJECTION, SOLUTION INTRAMUSCULAR; INTRAVENOUS; SUBCUTANEOUS at 17:10

## 2023-01-01 RX ADMIN — PANTOPRAZOLE SODIUM 40 MG: 40 INJECTION, POWDER, FOR SOLUTION INTRAVENOUS at 10:06

## 2023-01-01 RX ADMIN — HYDROMORPHONE HYDROCHLORIDE 0.5 MG: 1 INJECTION, SOLUTION INTRAMUSCULAR; INTRAVENOUS; SUBCUTANEOUS at 03:01

## 2023-01-01 RX ADMIN — LORAZEPAM 0.5 MG: 2 INJECTION INTRAMUSCULAR; INTRAVENOUS at 18:29

## 2023-01-01 RX ADMIN — HYDROMORPHONE HYDROCHLORIDE 2 MG: 2 TABLET ORAL at 03:07

## 2023-01-01 RX ADMIN — EPOETIN ALFA-EPBX 13000 UNITS: 10000 INJECTION, SOLUTION INTRAVENOUS; SUBCUTANEOUS at 10:42

## 2023-01-01 RX ADMIN — DIPHENHYDRAMINE HYDROCHLORIDE 25 MG: 50 INJECTION, SOLUTION INTRAMUSCULAR; INTRAVENOUS at 14:04

## 2023-01-01 RX ADMIN — DIPHENHYDRAMINE HYDROCHLORIDE 50 MG: 50 INJECTION INTRAMUSCULAR; INTRAVENOUS at 18:13

## 2023-01-01 RX ADMIN — SENNOSIDES AND DOCUSATE SODIUM 2 TABLET: 50; 8.6 TABLET ORAL at 06:08

## 2023-01-01 RX ADMIN — HYDROMORPHONE HYDROCHLORIDE 0.5 MG: 1 INJECTION, SOLUTION INTRAMUSCULAR; INTRAVENOUS; SUBCUTANEOUS at 06:26

## 2023-01-01 RX ADMIN — ESCITALOPRAM OXALATE 10 MG: 10 TABLET ORAL at 13:28

## 2023-01-01 RX ADMIN — ONDANSETRON 4 MG: 2 INJECTION INTRAMUSCULAR; INTRAVENOUS at 20:10

## 2023-01-01 RX ADMIN — HYDROMORPHONE HYDROCHLORIDE 0.5 MG: 1 INJECTION, SOLUTION INTRAMUSCULAR; INTRAVENOUS; SUBCUTANEOUS at 22:49

## 2023-01-01 RX ADMIN — DIPHENHYDRAMINE HYDROCHLORIDE 25 MG: 50 INJECTION, SOLUTION INTRAMUSCULAR; INTRAVENOUS at 00:48

## 2023-01-01 RX ADMIN — ONDANSETRON 4 MG: 2 INJECTION INTRAMUSCULAR; INTRAVENOUS at 08:13

## 2023-01-01 RX ADMIN — DIPHENHYDRAMINE HYDROCHLORIDE 25 MG: 50 INJECTION, SOLUTION INTRAMUSCULAR; INTRAVENOUS at 12:32

## 2023-01-01 RX ADMIN — LORAZEPAM 0.5 MG: 2 INJECTION INTRAMUSCULAR; INTRAVENOUS at 05:53

## 2023-01-01 RX ADMIN — DEXTROSE MONOHYDRATE 25 G: 100 INJECTION, SOLUTION INTRAVENOUS at 10:29

## 2023-01-01 RX ADMIN — SODIUM CHLORIDE: 4 INJECTION, SOLUTION, CONCENTRATE INTRAVENOUS at 19:55

## 2023-01-01 RX ADMIN — HYDROMORPHONE HYDROCHLORIDE 1 MG: 1 INJECTION, SOLUTION INTRAMUSCULAR; INTRAVENOUS; SUBCUTANEOUS at 09:15

## 2023-01-01 RX ADMIN — HEPARIN SODIUM 4000 UNITS: 1000 INJECTION, SOLUTION INTRAVENOUS; SUBCUTANEOUS at 12:51

## 2023-01-01 RX ADMIN — SODIUM CHLORIDE: 4 INJECTION, SOLUTION, CONCENTRATE INTRAVENOUS at 11:07

## 2023-01-01 RX ADMIN — ESCITALOPRAM OXALATE 10 MG: 10 TABLET ORAL at 08:42

## 2023-01-01 RX ADMIN — LORAZEPAM 0.5 MG: 2 INJECTION INTRAMUSCULAR; INTRAVENOUS at 05:54

## 2023-01-01 RX ADMIN — CARVEDILOL 3.12 MG: 3.12 TABLET, FILM COATED ORAL at 16:22

## 2023-01-01 RX ADMIN — HYDROMORPHONE HYDROCHLORIDE 0.5 MG: 1 INJECTION, SOLUTION INTRAMUSCULAR; INTRAVENOUS; SUBCUTANEOUS at 16:37

## 2023-01-01 RX ADMIN — HYDROMORPHONE HYDROCHLORIDE 1 MG: 1 INJECTION, SOLUTION INTRAMUSCULAR; INTRAVENOUS; SUBCUTANEOUS at 16:50

## 2023-01-01 RX ADMIN — DIPHENHYDRAMINE HYDROCHLORIDE 25 MG: 50 INJECTION, SOLUTION INTRAMUSCULAR; INTRAVENOUS at 07:35

## 2023-01-01 RX ADMIN — HEPARIN SODIUM 2000 UNITS: 1000 INJECTION, SOLUTION INTRAVENOUS; SUBCUTANEOUS at 16:34

## 2023-01-01 RX ADMIN — ACYCLOVIR 800 MG: 800 TABLET ORAL at 20:10

## 2023-01-01 RX ADMIN — HYDROMORPHONE HYDROCHLORIDE 1 MG: 1 INJECTION, SOLUTION INTRAMUSCULAR; INTRAVENOUS; SUBCUTANEOUS at 03:47

## 2023-01-01 RX ADMIN — HYDROMORPHONE HYDROCHLORIDE 1 MG: 1 INJECTION, SOLUTION INTRAMUSCULAR; INTRAVENOUS; SUBCUTANEOUS at 06:28

## 2023-01-01 RX ADMIN — HYDROMORPHONE HYDROCHLORIDE 0.5 MG: 1 INJECTION, SOLUTION INTRAMUSCULAR; INTRAVENOUS; SUBCUTANEOUS at 21:59

## 2023-01-01 RX ADMIN — OLANZAPINE 2.5 MG: 5 TABLET, FILM COATED ORAL at 17:26

## 2023-01-01 RX ADMIN — OLANZAPINE 2.5 MG: 5 TABLET, FILM COATED ORAL at 16:58

## 2023-01-01 RX ADMIN — MIDAZOLAM HYDROCHLORIDE 0.5 MG: 2 INJECTION, SOLUTION INTRAMUSCULAR; INTRAVENOUS at 12:42

## 2023-01-01 RX ADMIN — OLANZAPINE 2.5 MG: 5 TABLET, FILM COATED ORAL at 16:22

## 2023-01-01 RX ADMIN — HYDROMORPHONE HYDROCHLORIDE 0.5 MG: 1 INJECTION, SOLUTION INTRAMUSCULAR; INTRAVENOUS; SUBCUTANEOUS at 10:07

## 2023-01-01 RX ADMIN — EPOETIN ALFA 10000 UNITS: 10000 SOLUTION INTRAVENOUS; SUBCUTANEOUS at 11:30

## 2023-01-01 RX ADMIN — LORAZEPAM 1 MG: 2 INJECTION INTRAMUSCULAR; INTRAVENOUS at 17:38

## 2023-01-01 RX ADMIN — DIPHENHYDRAMINE HYDROCHLORIDE 25 MG: 50 INJECTION, SOLUTION INTRAMUSCULAR; INTRAVENOUS at 12:01

## 2023-01-01 RX ADMIN — DIPHENHYDRAMINE HYDROCHLORIDE 25 MG: 50 INJECTION, SOLUTION INTRAMUSCULAR; INTRAVENOUS at 01:45

## 2023-01-01 RX ADMIN — PANTOPRAZOLE SODIUM 40 MG: 40 INJECTION, POWDER, FOR SOLUTION INTRAVENOUS at 10:12

## 2023-01-01 RX ADMIN — LORAZEPAM 0.5 MG: 2 INJECTION INTRAMUSCULAR; INTRAVENOUS at 09:09

## 2023-01-01 RX ADMIN — LORAZEPAM 0.5 MG: 2 INJECTION INTRAMUSCULAR; INTRAVENOUS at 07:55

## 2023-01-01 RX ADMIN — ESCITALOPRAM OXALATE 10 MG: 10 TABLET ORAL at 18:24

## 2023-01-01 RX ADMIN — LORAZEPAM 0.5 MG: 2 INJECTION INTRAMUSCULAR; INTRAVENOUS at 09:40

## 2023-01-01 RX ADMIN — DEXTROSE MONOHYDRATE: 100 INJECTION, SOLUTION INTRAVENOUS at 00:01

## 2023-01-01 RX ADMIN — ONDANSETRON 4 MG: 2 INJECTION INTRAMUSCULAR; INTRAVENOUS at 07:55

## 2023-01-01 RX ADMIN — LORAZEPAM 0.5 MG: 2 INJECTION INTRAMUSCULAR; INTRAVENOUS at 15:19

## 2023-01-01 RX ADMIN — CARVEDILOL 3.12 MG: 3.12 TABLET, FILM COATED ORAL at 09:22

## 2023-01-01 RX ADMIN — LORAZEPAM 0.5 MG: 2 INJECTION INTRAMUSCULAR; INTRAVENOUS at 20:05

## 2023-01-01 RX ADMIN — DEXTROSE MONOHYDRATE: 100 INJECTION, SOLUTION INTRAVENOUS at 03:14

## 2023-01-01 RX ADMIN — HYDROMORPHONE HYDROCHLORIDE 1 MG: 1 INJECTION, SOLUTION INTRAMUSCULAR; INTRAVENOUS; SUBCUTANEOUS at 22:43

## 2023-01-01 RX ADMIN — LANSOPRAZOLE 30 MG: 30 TABLET, ORALLY DISINTEGRATING, DELAYED RELEASE ORAL at 05:21

## 2023-01-01 RX ADMIN — DRONABINOL 2.5 MG: 2.5 CAPSULE ORAL at 10:49

## 2023-01-01 RX ADMIN — DEXTROSE MONOHYDRATE 250 ML: 100 INJECTION, SOLUTION INTRAVENOUS at 04:46

## 2023-01-01 RX ADMIN — LORAZEPAM 0.5 MG: 2 INJECTION INTRAMUSCULAR; INTRAVENOUS at 20:46

## 2023-01-01 RX ADMIN — ONDANSETRON 4 MG: 2 INJECTION INTRAMUSCULAR; INTRAVENOUS at 17:20

## 2023-01-01 RX ADMIN — LORAZEPAM 0.5 MG: 2 INJECTION INTRAMUSCULAR; INTRAVENOUS at 06:08

## 2023-01-01 RX ADMIN — LORAZEPAM 0.5 MG: 2 INJECTION INTRAMUSCULAR; INTRAVENOUS at 12:54

## 2023-01-01 RX ADMIN — DIPHENHYDRAMINE HYDROCHLORIDE 25 MG: 50 INJECTION, SOLUTION INTRAMUSCULAR; INTRAVENOUS at 00:39

## 2023-01-01 RX ADMIN — LORAZEPAM 0.5 MG: 2 INJECTION INTRAMUSCULAR; INTRAVENOUS at 10:12

## 2023-01-01 RX ADMIN — Medication 100 MG: at 05:32

## 2023-01-01 RX ADMIN — PANTOPRAZOLE SODIUM 40 MG: 40 INJECTION, POWDER, FOR SOLUTION INTRAVENOUS at 10:24

## 2023-01-01 RX ADMIN — Medication 100 MG: at 05:20

## 2023-01-01 RX ADMIN — SODIUM CHLORIDE: 4 INJECTION, SOLUTION, CONCENTRATE INTRAVENOUS at 06:48

## 2023-01-01 RX ADMIN — ESCITALOPRAM OXALATE 10 MG: 10 TABLET ORAL at 05:17

## 2023-01-01 RX ADMIN — DEXTROSE MONOHYDRATE 25 G: 100 INJECTION, SOLUTION INTRAVENOUS at 15:13

## 2023-01-01 RX ADMIN — DIPHENHYDRAMINE HYDROCHLORIDE 50 MG: 50 INJECTION, SOLUTION INTRAMUSCULAR; INTRAVENOUS at 13:31

## 2023-01-01 RX ADMIN — CARVEDILOL 3.12 MG: 3.12 TABLET, FILM COATED ORAL at 05:31

## 2023-01-01 RX ADMIN — HYDROMORPHONE HYDROCHLORIDE 0.5 MG: 1 INJECTION, SOLUTION INTRAMUSCULAR; INTRAVENOUS; SUBCUTANEOUS at 13:08

## 2023-01-01 RX ADMIN — HYDROXYCHLOROQUINE SULFATE 200 MG: 200 TABLET ORAL at 05:15

## 2023-01-01 RX ADMIN — HYDROMORPHONE HYDROCHLORIDE 2 MG: 2 TABLET ORAL at 18:50

## 2023-01-01 RX ADMIN — ACYCLOVIR 800 MG: 800 TABLET ORAL at 20:00

## 2023-01-01 RX ADMIN — PIPERACILLIN AND TAZOBACTAM 4.5 G: 4; .5 INJECTION, POWDER, FOR SOLUTION INTRAVENOUS at 05:07

## 2023-01-01 RX ADMIN — Medication 220 MG: at 05:17

## 2023-01-01 RX ADMIN — DIPHENHYDRAMINE HYDROCHLORIDE 25 MG: 50 INJECTION, SOLUTION INTRAMUSCULAR; INTRAVENOUS at 06:09

## 2023-01-01 RX ADMIN — MIDAZOLAM HYDROCHLORIDE 0.5 MG: 1 INJECTION INTRAMUSCULAR; INTRAVENOUS at 12:42

## 2023-01-01 RX ADMIN — METOPROLOL TARTRATE 12.5 MG: 25 TABLET, FILM COATED ORAL at 12:33

## 2023-01-01 RX ADMIN — PIPERACILLIN AND TAZOBACTAM 4.5 G: 4; .5 INJECTION, POWDER, FOR SOLUTION INTRAVENOUS at 01:19

## 2023-01-01 RX ADMIN — HYDROMORPHONE HYDROCHLORIDE 0.5 MG: 1 INJECTION, SOLUTION INTRAMUSCULAR; INTRAVENOUS; SUBCUTANEOUS at 08:36

## 2023-01-01 RX ADMIN — MYCOPHENOLATE MOFETIL 500 MG: 200 POWDER, FOR SUSPENSION ORAL at 15:05

## 2023-01-01 RX ADMIN — DEXAMETHASONE SODIUM PHOSPHATE 0.76 MG: 4 INJECTION, SOLUTION INTRAMUSCULAR; INTRAVENOUS at 05:22

## 2023-01-01 RX ADMIN — SENNOSIDES AND DOCUSATE SODIUM 2 TABLET: 50; 8.6 TABLET ORAL at 09:23

## 2023-01-01 RX ADMIN — ONDANSETRON 4 MG: 2 INJECTION INTRAMUSCULAR; INTRAVENOUS at 17:10

## 2023-01-01 RX ADMIN — PREDNISONE 5 MG: 5 TABLET ORAL at 06:08

## 2023-01-01 RX ADMIN — LOSARTAN POTASSIUM 25 MG: 25 TABLET, FILM COATED ORAL at 09:24

## 2023-01-01 RX ADMIN — LORAZEPAM 0.5 MG: 2 INJECTION INTRAMUSCULAR; INTRAVENOUS at 09:44

## 2023-01-01 RX ADMIN — HYDROMORPHONE HYDROCHLORIDE 0.5 MG: 1 INJECTION, SOLUTION INTRAMUSCULAR; INTRAVENOUS; SUBCUTANEOUS at 01:37

## 2023-01-01 RX ADMIN — HYDROMORPHONE HYDROCHLORIDE 0.5 MG: 1 INJECTION, SOLUTION INTRAMUSCULAR; INTRAVENOUS; SUBCUTANEOUS at 11:36

## 2023-01-01 RX ADMIN — HYDROMORPHONE HYDROCHLORIDE 0.5 MG: 1 INJECTION, SOLUTION INTRAMUSCULAR; INTRAVENOUS; SUBCUTANEOUS at 20:20

## 2023-01-01 RX ADMIN — AMLODIPINE BESYLATE 10 MG: 5 TABLET ORAL at 06:31

## 2023-01-01 RX ADMIN — LORAZEPAM 0.5 MG: 2 INJECTION INTRAMUSCULAR; INTRAVENOUS at 20:57

## 2023-01-01 RX ADMIN — OXYMETAZOLINE HCL 2 SPRAY: 0.05 SPRAY NASAL at 20:40

## 2023-01-01 RX ADMIN — HYDROMORPHONE HYDROCHLORIDE 1 MG: 1 INJECTION, SOLUTION INTRAMUSCULAR; INTRAVENOUS; SUBCUTANEOUS at 11:22

## 2023-01-01 RX ADMIN — HYDRALAZINE HYDROCHLORIDE 10 MG: 20 INJECTION, SOLUTION INTRAMUSCULAR; INTRAVENOUS at 15:55

## 2023-01-01 RX ADMIN — DEXTROSE MONOHYDRATE 25 G: 100 INJECTION, SOLUTION INTRAVENOUS at 13:51

## 2023-01-01 RX ADMIN — HYDROXYCHLOROQUINE SULFATE 200 MG: 200 TABLET ORAL at 08:44

## 2023-01-01 RX ADMIN — OLANZAPINE 2.5 MG: 5 TABLET, FILM COATED ORAL at 17:13

## 2023-01-01 RX ADMIN — HYDROMORPHONE HYDROCHLORIDE 1 MG: 1 INJECTION, SOLUTION INTRAMUSCULAR; INTRAVENOUS; SUBCUTANEOUS at 18:13

## 2023-01-01 RX ADMIN — MYCOPHENOLATE MOFETIL 500 MG: 200 POWDER, FOR SUSPENSION ORAL at 05:40

## 2023-01-01 RX ADMIN — LORAZEPAM 0.5 MG: 2 INJECTION INTRAMUSCULAR; INTRAVENOUS at 04:38

## 2023-01-01 RX ADMIN — SENNOSIDES AND DOCUSATE SODIUM 2 TABLET: 50; 8.6 TABLET ORAL at 05:17

## 2023-01-01 RX ADMIN — CARVEDILOL 3.12 MG: 3.12 TABLET, FILM COATED ORAL at 17:50

## 2023-01-01 RX ADMIN — PANTOPRAZOLE SODIUM 40 MG: 40 INJECTION, POWDER, FOR SOLUTION INTRAVENOUS at 09:02

## 2023-01-01 RX ADMIN — MULTIPLE VITAMINS W/ MINERALS TAB 1 TABLET: TAB at 05:38

## 2023-01-01 RX ADMIN — LORAZEPAM 0.5 MG: 2 INJECTION INTRAMUSCULAR; INTRAVENOUS at 08:20

## 2023-01-01 RX ADMIN — DEXTROSE MONOHYDRATE 25 G: 100 INJECTION, SOLUTION INTRAVENOUS at 04:09

## 2023-01-01 RX ADMIN — PANTOPRAZOLE SODIUM 40 MG: 40 INJECTION, POWDER, FOR SOLUTION INTRAVENOUS at 09:44

## 2023-01-01 RX ADMIN — DEXTROSE MONOHYDRATE: 100 INJECTION, SOLUTION INTRAVENOUS at 18:08

## 2023-01-01 RX ADMIN — ONDANSETRON 4 MG: 2 INJECTION INTRAMUSCULAR; INTRAVENOUS at 08:14

## 2023-01-01 RX ADMIN — ESCITALOPRAM OXALATE 10 MG: 10 TABLET ORAL at 05:38

## 2023-01-01 RX ADMIN — DEXTROSE MONOHYDRATE 25 G: 100 INJECTION, SOLUTION INTRAVENOUS at 21:28

## 2023-01-01 RX ADMIN — LORAZEPAM 0.5 MG: 2 INJECTION INTRAMUSCULAR; INTRAVENOUS at 19:40

## 2023-01-01 RX ADMIN — OLANZAPINE 2.5 MG: 5 TABLET, FILM COATED ORAL at 18:07

## 2023-01-01 RX ADMIN — HYDROMORPHONE HYDROCHLORIDE 0.5 MG: 1 INJECTION, SOLUTION INTRAMUSCULAR; INTRAVENOUS; SUBCUTANEOUS at 05:22

## 2023-01-01 RX ADMIN — HYDROMORPHONE HYDROCHLORIDE 2 MG: 2 TABLET ORAL at 19:41

## 2023-01-01 RX ADMIN — PIPERACILLIN AND TAZOBACTAM 4.5 G: 4; .5 INJECTION, POWDER, FOR SOLUTION INTRAVENOUS at 11:31

## 2023-01-01 RX ADMIN — DIPHENHYDRAMINE HYDROCHLORIDE 25 MG: 50 INJECTION, SOLUTION INTRAMUSCULAR; INTRAVENOUS at 22:54

## 2023-01-01 RX ADMIN — DIPHENHYDRAMINE HYDROCHLORIDE 25 MG: 50 INJECTION INTRAMUSCULAR; INTRAVENOUS at 22:16

## 2023-01-01 RX ADMIN — ACYCLOVIR 800 MG: 800 TABLET ORAL at 11:40

## 2023-01-01 RX ADMIN — SENNOSIDES AND DOCUSATE SODIUM 2 TABLET: 50; 8.6 TABLET ORAL at 04:15

## 2023-01-01 RX ADMIN — PREDNISONE 5 MG: 5 TABLET ORAL at 10:09

## 2023-01-01 RX ADMIN — PIPERACILLIN AND TAZOBACTAM 4.5 G: 4; .5 INJECTION, POWDER, FOR SOLUTION INTRAVENOUS at 23:30

## 2023-01-01 RX ADMIN — HYDROMORPHONE HYDROCHLORIDE 0.5 MG: 1 INJECTION, SOLUTION INTRAMUSCULAR; INTRAVENOUS; SUBCUTANEOUS at 08:04

## 2023-01-01 RX ADMIN — ESCITALOPRAM OXALATE 10 MG: 10 TABLET ORAL at 05:12

## 2023-01-01 RX ADMIN — LORAZEPAM 0.5 MG: 2 INJECTION INTRAMUSCULAR; INTRAVENOUS at 12:56

## 2023-01-01 RX ADMIN — LORAZEPAM 0.5 MG: 2 INJECTION INTRAMUSCULAR; INTRAVENOUS at 21:23

## 2023-01-01 RX ADMIN — LIDOCAINE HYDROCHLORIDE 1 ML: 10 INJECTION, SOLUTION INFILTRATION; PERINEURAL at 13:20

## 2023-01-01 RX ADMIN — ONDANSETRON 4 MG: 2 INJECTION INTRAMUSCULAR; INTRAVENOUS at 05:13

## 2023-01-01 RX ADMIN — DEXTROSE MONOHYDRATE: 100 INJECTION, SOLUTION INTRAVENOUS at 22:49

## 2023-01-01 RX ADMIN — OLANZAPINE 2.5 MG: 5 TABLET, FILM COATED ORAL at 18:13

## 2023-01-01 RX ADMIN — DIPHENHYDRAMINE HYDROCHLORIDE 25 MG: 50 INJECTION INTRAMUSCULAR; INTRAVENOUS at 14:39

## 2023-01-01 RX ADMIN — CEFAZOLIN SODIUM 1 G: 1 INJECTION, SOLUTION INTRAVENOUS at 18:20

## 2023-01-01 RX ADMIN — LORAZEPAM 0.5 MG: 2 INJECTION INTRAMUSCULAR; INTRAVENOUS at 15:49

## 2023-01-01 RX ADMIN — CARVEDILOL 3.12 MG: 3.12 TABLET, FILM COATED ORAL at 18:07

## 2023-01-01 RX ADMIN — DIPHENHYDRAMINE HYDROCHLORIDE 25 MG: 50 INJECTION, SOLUTION INTRAMUSCULAR; INTRAVENOUS at 10:53

## 2023-01-01 RX ADMIN — HYDROMORPHONE HYDROCHLORIDE 1 MG: 1 INJECTION, SOLUTION INTRAMUSCULAR; INTRAVENOUS; SUBCUTANEOUS at 04:07

## 2023-01-01 RX ADMIN — AMLODIPINE BESYLATE 10 MG: 5 TABLET ORAL at 04:10

## 2023-01-01 RX ADMIN — DRONABINOL 2.5 MG: 2.5 CAPSULE ORAL at 16:41

## 2023-01-01 RX ADMIN — HYDROMORPHONE HYDROCHLORIDE 2 MG: 2 TABLET ORAL at 20:56

## 2023-01-01 RX ADMIN — DEXTROSE MONOHYDRATE 50 ML: 25 INJECTION, SOLUTION INTRAVENOUS at 20:07

## 2023-01-01 RX ADMIN — ONDANSETRON 4 MG: 2 INJECTION INTRAMUSCULAR; INTRAVENOUS at 16:41

## 2023-01-01 RX ADMIN — SODIUM CHLORIDE 1 G: 1 TABLET ORAL at 09:23

## 2023-01-01 RX ADMIN — LORAZEPAM 0.5 MG: 2 INJECTION INTRAMUSCULAR; INTRAVENOUS at 18:14

## 2023-01-01 RX ADMIN — PREDNISONE 5 MG: 10 TABLET ORAL at 04:42

## 2023-01-01 RX ADMIN — SENNOSIDES AND DOCUSATE SODIUM 2 TABLET: 50; 8.6 TABLET ORAL at 18:16

## 2023-01-01 RX ADMIN — CARVEDILOL 3.12 MG: 3.12 TABLET, FILM COATED ORAL at 09:17

## 2023-01-01 RX ADMIN — HYDROMORPHONE HYDROCHLORIDE 2 MG: 2 TABLET ORAL at 11:39

## 2023-01-01 RX ADMIN — ACYCLOVIR 200 MG: 200 CAPSULE ORAL at 08:44

## 2023-01-01 RX ADMIN — HYDROMORPHONE HYDROCHLORIDE 2 MG: 2 TABLET ORAL at 06:13

## 2023-01-01 RX ADMIN — HYDROMORPHONE HYDROCHLORIDE 0.5 MG: 1 INJECTION, SOLUTION INTRAMUSCULAR; INTRAVENOUS; SUBCUTANEOUS at 12:55

## 2023-01-01 RX ADMIN — LORAZEPAM 0.5 MG: 2 INJECTION INTRAMUSCULAR; INTRAVENOUS at 00:06

## 2023-01-01 RX ADMIN — PROTAMINE SULFATE 50 MG: 10 INJECTION, SOLUTION INTRAVENOUS at 14:42

## 2023-01-01 RX ADMIN — DEXAMETHASONE SODIUM PHOSPHATE 0.76 MG: 4 INJECTION, SOLUTION INTRAMUSCULAR; INTRAVENOUS at 14:01

## 2023-01-01 RX ADMIN — PROMETHAZINE HYDROCHLORIDE 25 MG: 25 TABLET ORAL at 11:06

## 2023-01-01 RX ADMIN — DEXTROSE MONOHYDRATE 25 G: 100 INJECTION, SOLUTION INTRAVENOUS at 05:01

## 2023-01-01 RX ADMIN — CLONIDINE 1 PATCH: 0.2 PATCH TRANSDERMAL at 16:23

## 2023-01-01 RX ADMIN — HYDROMORPHONE HYDROCHLORIDE 1 MG: 1 INJECTION, SOLUTION INTRAMUSCULAR; INTRAVENOUS; SUBCUTANEOUS at 23:30

## 2023-01-01 RX ADMIN — LORAZEPAM 0.5 MG: 1 TABLET ORAL at 11:39

## 2023-01-01 RX ADMIN — HYDROMORPHONE HYDROCHLORIDE 0.5 MG: 1 INJECTION, SOLUTION INTRAMUSCULAR; INTRAVENOUS; SUBCUTANEOUS at 21:23

## 2023-01-01 RX ADMIN — MYCOPHENOLATE MOFETIL 500 MG: 200 POWDER, FOR SUSPENSION ORAL at 18:25

## 2023-01-01 RX ADMIN — SODIUM CHLORIDE: 4 INJECTION, SOLUTION, CONCENTRATE INTRAVENOUS at 00:42

## 2023-01-01 RX ADMIN — LORAZEPAM 0.5 MG: 2 INJECTION INTRAMUSCULAR; INTRAVENOUS at 02:24

## 2023-01-01 RX ADMIN — Medication 1 ML: at 14:05

## 2023-01-01 RX ADMIN — CALCIUM GLUCONATE: 98 INJECTION, SOLUTION INTRAVENOUS at 20:03

## 2023-01-01 RX ADMIN — Medication 1 ML: at 13:30

## 2023-01-01 RX ADMIN — ACYCLOVIR 200 MG: 200 CAPSULE ORAL at 18:00

## 2023-01-01 RX ADMIN — LORAZEPAM 0.5 MG: 2 INJECTION INTRAMUSCULAR; INTRAVENOUS at 03:56

## 2023-01-01 RX ADMIN — MAGNESIUM SULFATE HEPTAHYDRATE 2 G: 2 INJECTION, SOLUTION INTRAVENOUS at 08:07

## 2023-01-01 RX ADMIN — PREDNISONE 5 MG: 5 TABLET ORAL at 05:38

## 2023-01-01 RX ADMIN — DIPHENHYDRAMINE HYDROCHLORIDE 50 MG: 50 INJECTION, SOLUTION INTRAMUSCULAR; INTRAVENOUS at 10:22

## 2023-01-01 RX ADMIN — PIPERACILLIN AND TAZOBACTAM 4.5 G: 4; .5 INJECTION, POWDER, FOR SOLUTION INTRAVENOUS at 22:08

## 2023-01-01 RX ADMIN — ONDANSETRON 4 MG: 2 INJECTION INTRAMUSCULAR; INTRAVENOUS at 21:00

## 2023-01-01 RX ADMIN — LORAZEPAM 0.5 MG: 2 INJECTION INTRAMUSCULAR; INTRAVENOUS at 11:49

## 2023-01-01 RX ADMIN — DIPHENHYDRAMINE HYDROCHLORIDE 25 MG: 50 INJECTION, SOLUTION INTRAMUSCULAR; INTRAVENOUS at 14:34

## 2023-01-01 RX ADMIN — HYDROMORPHONE HYDROCHLORIDE 0.5 MG: 1 INJECTION, SOLUTION INTRAMUSCULAR; INTRAVENOUS; SUBCUTANEOUS at 03:55

## 2023-01-01 RX ADMIN — PROCHLORPERAZINE EDISYLATE 10 MG: 5 INJECTION INTRAMUSCULAR; INTRAVENOUS at 11:37

## 2023-01-01 RX ADMIN — LIDOCAINE HYDROCHLORIDE 1 ML: 10 INJECTION, SOLUTION INFILTRATION; PERINEURAL at 13:30

## 2023-01-01 RX ADMIN — IOHEXOL 15 ML: 300 INJECTION, SOLUTION INTRAVENOUS at 14:00

## 2023-01-01 RX ADMIN — METHYLPREDNISOLONE SODIUM SUCCINATE 125 MG: 125 INJECTION, POWDER, FOR SOLUTION INTRAMUSCULAR; INTRAVENOUS at 13:24

## 2023-01-01 RX ADMIN — HYDROMORPHONE HYDROCHLORIDE 0.5 MG: 1 INJECTION, SOLUTION INTRAMUSCULAR; INTRAVENOUS; SUBCUTANEOUS at 13:13

## 2023-01-01 RX ADMIN — MYCOPHENOLATE MOFETIL 500 MG: 200 POWDER, FOR SUSPENSION ORAL at 10:47

## 2023-01-01 RX ADMIN — DIPHENHYDRAMINE HYDROCHLORIDE 25 MG: 50 INJECTION, SOLUTION INTRAMUSCULAR; INTRAVENOUS at 10:17

## 2023-01-01 RX ADMIN — PREGABALIN 25 MG: 25 CAPSULE ORAL at 18:28

## 2023-01-01 RX ADMIN — HYDROMORPHONE HYDROCHLORIDE 1 MG: 1 INJECTION, SOLUTION INTRAMUSCULAR; INTRAVENOUS; SUBCUTANEOUS at 15:43

## 2023-01-01 RX ADMIN — DRONABINOL 2.5 MG: 2.5 CAPSULE ORAL at 17:36

## 2023-01-01 RX ADMIN — Medication 100 MG: at 05:17

## 2023-01-01 RX ADMIN — HYDROXYCHLOROQUINE SULFATE 200 MG: 200 TABLET ORAL at 05:17

## 2023-01-01 RX ADMIN — LORAZEPAM 0.5 MG: 2 INJECTION INTRAMUSCULAR; INTRAVENOUS at 08:57

## 2023-01-01 RX ADMIN — Medication 100 MG: at 04:15

## 2023-01-01 RX ADMIN — EPOETIN ALFA-EPBX 3000 UNITS: 3000 INJECTION, SOLUTION INTRAVENOUS; SUBCUTANEOUS at 10:41

## 2023-01-01 RX ADMIN — ONDANSETRON 4 MG: 2 INJECTION INTRAMUSCULAR; INTRAVENOUS at 17:35

## 2023-01-01 RX ADMIN — ONDANSETRON 4 MG: 2 INJECTION INTRAMUSCULAR; INTRAVENOUS at 03:56

## 2023-01-01 RX ADMIN — ONDANSETRON 4 MG: 2 INJECTION INTRAMUSCULAR; INTRAVENOUS at 15:32

## 2023-01-01 RX ADMIN — DEXTROSE MONOHYDRATE: 100 INJECTION, SOLUTION INTRAVENOUS at 13:36

## 2023-01-01 RX ADMIN — ESCITALOPRAM OXALATE 10 MG: 10 TABLET ORAL at 05:20

## 2023-01-01 RX ADMIN — MAGNESIUM SULFATE HEPTAHYDRATE 4 G: 40 INJECTION, SOLUTION INTRAVENOUS at 16:08

## 2023-01-01 RX ADMIN — HYDROMORPHONE HYDROCHLORIDE 0.5 MG: 1 INJECTION, SOLUTION INTRAMUSCULAR; INTRAVENOUS; SUBCUTANEOUS at 12:37

## 2023-01-01 RX ADMIN — ONDANSETRON 4 MG: 4 TABLET, ORALLY DISINTEGRATING ORAL at 08:36

## 2023-01-01 RX ADMIN — HYDROMORPHONE HYDROCHLORIDE 0.5 MG: 1 INJECTION, SOLUTION INTRAMUSCULAR; INTRAVENOUS; SUBCUTANEOUS at 08:15

## 2023-01-01 RX ADMIN — DIPHENHYDRAMINE HYDROCHLORIDE 25 MG: 50 INJECTION, SOLUTION INTRAMUSCULAR; INTRAVENOUS at 16:40

## 2023-01-01 RX ADMIN — METHYLPREDNISOLONE SODIUM SUCCINATE 125 MG: 125 INJECTION, POWDER, FOR SOLUTION INTRAMUSCULAR; INTRAVENOUS at 14:03

## 2023-01-01 RX ADMIN — CEFAZOLIN SODIUM 1 G: 1 INJECTION, SOLUTION INTRAVENOUS at 17:24

## 2023-01-01 RX ADMIN — ACYCLOVIR 200 MG: 200 CAPSULE ORAL at 18:05

## 2023-01-01 RX ADMIN — LORAZEPAM 0.5 MG: 2 INJECTION INTRAMUSCULAR; INTRAVENOUS at 17:58

## 2023-01-01 RX ADMIN — ONDANSETRON 4 MG: 2 INJECTION INTRAMUSCULAR; INTRAVENOUS at 16:49

## 2023-01-01 RX ADMIN — LORAZEPAM 0.5 MG: 2 INJECTION INTRAMUSCULAR; INTRAVENOUS at 11:36

## 2023-01-01 RX ADMIN — PREDNISONE 5 MG: 5 TABLET ORAL at 09:58

## 2023-01-01 RX ADMIN — DEXTROSE MONOHYDRATE 25 G: 100 INJECTION, SOLUTION INTRAVENOUS at 06:17

## 2023-01-01 RX ADMIN — ONDANSETRON 4 MG: 2 INJECTION INTRAMUSCULAR; INTRAVENOUS at 03:01

## 2023-01-01 RX ADMIN — LOSARTAN POTASSIUM 25 MG: 50 TABLET, FILM COATED ORAL at 06:00

## 2023-01-01 RX ADMIN — SENNOSIDES AND DOCUSATE SODIUM 2 TABLET: 50; 8.6 TABLET ORAL at 18:23

## 2023-01-01 RX ADMIN — DIPHENHYDRAMINE HYDROCHLORIDE 25 MG: 50 INJECTION, SOLUTION INTRAMUSCULAR; INTRAVENOUS at 09:40

## 2023-01-01 RX ADMIN — FENTANYL CITRATE 50 MCG: 50 INJECTION, SOLUTION INTRAMUSCULAR; INTRAVENOUS at 16:28

## 2023-01-01 RX ADMIN — IOHEXOL 100 ML: 350 INJECTION, SOLUTION INTRAVENOUS at 09:50

## 2023-01-01 RX ADMIN — LORAZEPAM 0.5 MG: 2 INJECTION INTRAMUSCULAR; INTRAVENOUS at 16:58

## 2023-01-01 RX ADMIN — HYDROMORPHONE HYDROCHLORIDE 0.5 MG: 1 INJECTION, SOLUTION INTRAMUSCULAR; INTRAVENOUS; SUBCUTANEOUS at 15:33

## 2023-01-01 RX ADMIN — ONDANSETRON 4 MG: 2 INJECTION INTRAMUSCULAR; INTRAVENOUS at 13:26

## 2023-01-01 RX ADMIN — MYCOPHENOLATE MOFETIL 500 MG: 200 POWDER, FOR SUSPENSION ORAL at 09:21

## 2023-01-01 RX ADMIN — DIPHENHYDRAMINE HYDROCHLORIDE 25 MG: 50 INJECTION, SOLUTION INTRAMUSCULAR; INTRAVENOUS at 12:42

## 2023-01-01 RX ADMIN — HYDROXYCHLOROQUINE SULFATE 200 MG: 200 TABLET ORAL at 05:39

## 2023-01-01 RX ADMIN — LORAZEPAM 0.5 MG: 2 INJECTION INTRAMUSCULAR; INTRAVENOUS at 17:10

## 2023-01-01 RX ADMIN — EPOETIN ALFA-EPBX 13000 UNITS: 10000 INJECTION, SOLUTION INTRAVENOUS; SUBCUTANEOUS at 14:50

## 2023-01-01 RX ADMIN — OMEPRAZOLE 20 MG: 20 CAPSULE, DELAYED RELEASE ORAL at 08:42

## 2023-01-01 RX ADMIN — DIPHENHYDRAMINE HYDROCHLORIDE 25 MG: 50 INJECTION, SOLUTION INTRAMUSCULAR; INTRAVENOUS at 16:55

## 2023-01-01 RX ADMIN — HYDROMORPHONE HYDROCHLORIDE 0.5 MG: 1 INJECTION, SOLUTION INTRAMUSCULAR; INTRAVENOUS; SUBCUTANEOUS at 10:39

## 2023-01-01 RX ADMIN — CARVEDILOL 3.12 MG: 3.12 TABLET, FILM COATED ORAL at 18:30

## 2023-01-01 RX ADMIN — PANTOPRAZOLE SODIUM 40 MG: 40 INJECTION, POWDER, FOR SOLUTION INTRAVENOUS at 10:37

## 2023-01-01 RX ADMIN — DIPHENHYDRAMINE HYDROCHLORIDE 25 MG: 50 INJECTION, SOLUTION INTRAMUSCULAR; INTRAVENOUS at 20:11

## 2023-01-01 RX ADMIN — HYDROXYCHLOROQUINE SULFATE 200 MG: 200 TABLET ORAL at 09:23

## 2023-01-01 RX ADMIN — SODIUM CHLORIDE: 4 INJECTION, SOLUTION, CONCENTRATE INTRAVENOUS at 22:40

## 2023-01-01 RX ADMIN — DIPHENHYDRAMINE HYDROCHLORIDE 25 MG: 50 INJECTION, SOLUTION INTRAMUSCULAR; INTRAVENOUS at 08:58

## 2023-01-01 RX ADMIN — LORAZEPAM 0.5 MG: 2 INJECTION INTRAMUSCULAR; INTRAVENOUS at 14:58

## 2023-01-01 RX ADMIN — DIPHENHYDRAMINE HYDROCHLORIDE 25 MG: 50 INJECTION, SOLUTION INTRAMUSCULAR; INTRAVENOUS at 15:13

## 2023-01-01 RX ADMIN — OMEPRAZOLE 20 MG: 20 CAPSULE, DELAYED RELEASE ORAL at 13:26

## 2023-01-01 RX ADMIN — ONDANSETRON 4 MG: 2 INJECTION INTRAMUSCULAR; INTRAVENOUS at 06:22

## 2023-01-01 RX ADMIN — SODIUM CHLORIDE: 234 INJECTION, SOLUTION, CONCENTRATE INTRAVENOUS at 17:40

## 2023-01-01 RX ADMIN — HYDROXYCHLOROQUINE SULFATE 200 MG: 200 TABLET ORAL at 21:17

## 2023-01-01 RX ADMIN — HYDROMORPHONE HYDROCHLORIDE 1 MG: 1 INJECTION, SOLUTION INTRAMUSCULAR; INTRAVENOUS; SUBCUTANEOUS at 17:49

## 2023-01-01 RX ADMIN — Medication 220 MG: at 18:23

## 2023-01-01 RX ADMIN — HYDROMORPHONE HYDROCHLORIDE 0.5 MG: 1 INJECTION, SOLUTION INTRAMUSCULAR; INTRAVENOUS; SUBCUTANEOUS at 02:43

## 2023-01-01 RX ADMIN — CARVEDILOL 3.12 MG: 3.12 TABLET, FILM COATED ORAL at 16:58

## 2023-01-01 RX ADMIN — DEXAMETHASONE SODIUM PHOSPHATE 0.76 MG: 4 INJECTION, SOLUTION INTRAMUSCULAR; INTRAVENOUS at 04:10

## 2023-01-01 RX ADMIN — ACYCLOVIR 400 MG: 800 TABLET ORAL at 14:48

## 2023-01-01 RX ADMIN — ONDANSETRON 4 MG: 2 INJECTION INTRAMUSCULAR; INTRAVENOUS at 00:29

## 2023-01-01 RX ADMIN — ACETAMINOPHEN 650 MG: 650 SUPPOSITORY RECTAL at 09:14

## 2023-01-01 RX ADMIN — ONDANSETRON 4 MG: 2 INJECTION INTRAMUSCULAR; INTRAVENOUS at 03:08

## 2023-01-01 RX ADMIN — HYDROMORPHONE HYDROCHLORIDE 1 MG: 1 INJECTION, SOLUTION INTRAMUSCULAR; INTRAVENOUS; SUBCUTANEOUS at 19:46

## 2023-01-01 RX ADMIN — PIPERACILLIN AND TAZOBACTAM 4.5 G: 4; .5 INJECTION, POWDER, FOR SOLUTION INTRAVENOUS at 08:12

## 2023-01-01 RX ADMIN — Medication 100 MG: at 18:24

## 2023-01-01 RX ADMIN — ESCITALOPRAM OXALATE 10 MG: 10 TABLET ORAL at 09:57

## 2023-01-01 RX ADMIN — LORAZEPAM 0.5 MG: 2 INJECTION INTRAMUSCULAR; INTRAVENOUS at 05:22

## 2023-01-01 RX ADMIN — ONDANSETRON 4 MG: 2 INJECTION INTRAMUSCULAR; INTRAVENOUS at 06:05

## 2023-01-01 RX ADMIN — PROTAMINE SULFATE 20 MG: 10 INJECTION, SOLUTION INTRAVENOUS at 16:45

## 2023-01-01 RX ADMIN — HEPARIN SODIUM: 1000 INJECTION, SOLUTION INTRAVENOUS; SUBCUTANEOUS at 08:30

## 2023-01-01 RX ADMIN — LORAZEPAM 0.5 MG: 2 INJECTION INTRAMUSCULAR; INTRAVENOUS at 20:19

## 2023-01-01 RX ADMIN — OLANZAPINE 2.5 MG: 5 TABLET, FILM COATED ORAL at 17:07

## 2023-01-01 RX ADMIN — OMEPRAZOLE 20 MG: 20 CAPSULE, DELAYED RELEASE ORAL at 10:08

## 2023-01-01 RX ADMIN — DIPHENHYDRAMINE HYDROCHLORIDE 25 MG: 50 INJECTION, SOLUTION INTRAMUSCULAR; INTRAVENOUS at 05:46

## 2023-01-01 RX ADMIN — ONDANSETRON 4 MG: 2 INJECTION INTRAMUSCULAR; INTRAVENOUS at 10:05

## 2023-01-01 RX ADMIN — CARVEDILOL 3.12 MG: 3.12 TABLET, FILM COATED ORAL at 17:13

## 2023-01-01 RX ADMIN — HYDROXYCHLOROQUINE SULFATE 200 MG: 200 TABLET ORAL at 10:48

## 2023-01-01 RX ADMIN — ONDANSETRON 4 MG: 2 INJECTION INTRAMUSCULAR; INTRAVENOUS at 06:51

## 2023-01-01 RX ADMIN — DEXTROSE MONOHYDRATE 25 G: 100 INJECTION, SOLUTION INTRAVENOUS at 09:49

## 2023-01-01 RX ADMIN — THIAMINE HYDROCHLORIDE 100 MG: 100 INJECTION, SOLUTION INTRAMUSCULAR; INTRAVENOUS at 09:16

## 2023-01-01 RX ADMIN — DEXTROSE MONOHYDRATE 25 G: 100 INJECTION, SOLUTION INTRAVENOUS at 22:41

## 2023-01-01 RX ADMIN — DIPHENHYDRAMINE HYDROCHLORIDE 25 MG: 50 INJECTION, SOLUTION INTRAMUSCULAR; INTRAVENOUS at 13:23

## 2023-01-01 RX ADMIN — HYDROMORPHONE HYDROCHLORIDE 0.5 MG: 1 INJECTION, SOLUTION INTRAMUSCULAR; INTRAVENOUS; SUBCUTANEOUS at 16:01

## 2023-01-01 RX ADMIN — DEXAMETHASONE SODIUM PHOSPHATE 0.76 MG: 4 INJECTION, SOLUTION INTRAMUSCULAR; INTRAVENOUS at 06:08

## 2023-01-01 RX ADMIN — HYDROMORPHONE HYDROCHLORIDE 2 MG: 2 TABLET ORAL at 02:12

## 2023-01-01 RX ADMIN — SENNOSIDES AND DOCUSATE SODIUM 2 TABLET: 50; 8.6 TABLET ORAL at 10:08

## 2023-01-01 RX ADMIN — PREGABALIN 25 MG: 25 CAPSULE ORAL at 17:50

## 2023-01-01 RX ADMIN — HYDROMORPHONE HYDROCHLORIDE 0.5 MG: 1 INJECTION, SOLUTION INTRAMUSCULAR; INTRAVENOUS; SUBCUTANEOUS at 10:46

## 2023-01-01 RX ADMIN — OLANZAPINE 2.5 MG: 5 TABLET, FILM COATED ORAL at 20:48

## 2023-01-01 RX ADMIN — SODIUM CHLORIDE: 4 INJECTION, SOLUTION, CONCENTRATE INTRAVENOUS at 20:04

## 2023-01-01 RX ADMIN — HYDROMORPHONE HYDROCHLORIDE 2 MG: 2 TABLET ORAL at 05:23

## 2023-01-01 RX ADMIN — Medication 220 MG: at 10:12

## 2023-01-01 RX ADMIN — HYDRALAZINE HYDROCHLORIDE 10 MG: 20 INJECTION, SOLUTION INTRAMUSCULAR; INTRAVENOUS at 16:40

## 2023-01-01 RX ADMIN — ACYCLOVIR 800 MG: 800 TABLET ORAL at 08:15

## 2023-01-01 RX ADMIN — DIPHENHYDRAMINE HYDROCHLORIDE 25 MG: 50 INJECTION, SOLUTION INTRAMUSCULAR; INTRAVENOUS at 12:56

## 2023-01-01 RX ADMIN — HYDROMORPHONE HYDROCHLORIDE 1 MG: 1 INJECTION, SOLUTION INTRAMUSCULAR; INTRAVENOUS; SUBCUTANEOUS at 06:17

## 2023-01-01 RX ADMIN — OLANZAPINE 2.5 MG: 5 TABLET, FILM COATED ORAL at 20:56

## 2023-01-01 RX ADMIN — DIPHENHYDRAMINE HYDROCHLORIDE 25 MG: 50 INJECTION INTRAMUSCULAR; INTRAVENOUS at 13:06

## 2023-01-01 RX ADMIN — FOLIC ACID 1 MG: 1 TABLET ORAL at 05:20

## 2023-01-01 RX ADMIN — LORAZEPAM 0.5 MG: 2 INJECTION INTRAMUSCULAR; INTRAVENOUS at 16:01

## 2023-01-01 RX ADMIN — ONDANSETRON 4 MG: 2 INJECTION INTRAMUSCULAR; INTRAVENOUS at 15:11

## 2023-01-01 RX ADMIN — ONDANSETRON 4 MG: 4 TABLET, ORALLY DISINTEGRATING ORAL at 11:39

## 2023-01-01 RX ADMIN — ONDANSETRON 4 MG: 2 INJECTION INTRAMUSCULAR; INTRAVENOUS at 16:37

## 2023-01-01 RX ADMIN — DIPHENHYDRAMINE HYDROCHLORIDE 25 MG: 50 INJECTION, SOLUTION INTRAMUSCULAR; INTRAVENOUS at 17:15

## 2023-01-01 RX ADMIN — DIPHENHYDRAMINE HYDROCHLORIDE 25 MG: 50 INJECTION INTRAMUSCULAR; INTRAVENOUS at 16:18

## 2023-01-01 RX ADMIN — HYDROMORPHONE HYDROCHLORIDE 2 MG: 2 TABLET ORAL at 19:46

## 2023-01-01 RX ADMIN — DIPHENHYDRAMINE HYDROCHLORIDE 25 MG: 50 INJECTION, SOLUTION INTRAMUSCULAR; INTRAVENOUS at 17:49

## 2023-01-01 RX ADMIN — HYDROMORPHONE HYDROCHLORIDE 0.5 MG: 1 INJECTION, SOLUTION INTRAMUSCULAR; INTRAVENOUS; SUBCUTANEOUS at 10:13

## 2023-01-01 RX ADMIN — HYDROMORPHONE HYDROCHLORIDE 0.5 MG: 1 INJECTION, SOLUTION INTRAMUSCULAR; INTRAVENOUS; SUBCUTANEOUS at 17:20

## 2023-01-01 RX ADMIN — ACYCLOVIR 800 MG: 800 TABLET ORAL at 00:47

## 2023-01-01 RX ADMIN — DIPHENHYDRAMINE HYDROCHLORIDE 25 MG: 50 INJECTION INTRAMUSCULAR; INTRAVENOUS at 08:04

## 2023-01-01 RX ADMIN — MYCOPHENOLATE MOFETIL 500 MG: 200 POWDER, FOR SUSPENSION ORAL at 10:49

## 2023-01-01 RX ADMIN — HYDROMORPHONE HYDROCHLORIDE 0.5 MG: 1 INJECTION, SOLUTION INTRAMUSCULAR; INTRAVENOUS; SUBCUTANEOUS at 16:49

## 2023-01-01 RX ADMIN — LORAZEPAM 0.5 MG: 0.5 TABLET ORAL at 11:28

## 2023-01-01 RX ADMIN — ACYCLOVIR 800 MG: 800 TABLET ORAL at 22:56

## 2023-01-01 RX ADMIN — EPOETIN ALFA-EPBX 3000 UNITS: 3000 INJECTION, SOLUTION INTRAVENOUS; SUBCUTANEOUS at 11:47

## 2023-01-01 RX ADMIN — ONDANSETRON 4 MG: 2 INJECTION INTRAMUSCULAR; INTRAVENOUS at 07:51

## 2023-01-01 RX ADMIN — ONDANSETRON 4 MG: 2 INJECTION INTRAMUSCULAR; INTRAVENOUS at 21:53

## 2023-01-01 RX ADMIN — CLONIDINE 1 PATCH: 0.2 PATCH TRANSDERMAL at 15:38

## 2023-01-01 RX ADMIN — DIPHENHYDRAMINE HYDROCHLORIDE 25 MG: 50 INJECTION, SOLUTION INTRAMUSCULAR; INTRAVENOUS at 08:46

## 2023-01-01 RX ADMIN — HYDROMORPHONE HYDROCHLORIDE 2 MG: 2 TABLET ORAL at 10:53

## 2023-01-01 RX ADMIN — OLANZAPINE 2.5 MG: 5 TABLET, FILM COATED ORAL at 18:29

## 2023-01-01 RX ADMIN — LORAZEPAM 1 MG: 2 INJECTION INTRAMUSCULAR; INTRAVENOUS at 21:24

## 2023-01-01 RX ADMIN — DEXTROSE MONOHYDRATE 25 G: 100 INJECTION, SOLUTION INTRAVENOUS at 23:13

## 2023-01-01 RX ADMIN — SODIUM CHLORIDE: 4 INJECTION, SOLUTION, CONCENTRATE INTRAVENOUS at 21:17

## 2023-01-01 RX ADMIN — DRONABINOL 2.5 MG: 2.5 CAPSULE ORAL at 10:46

## 2023-01-01 RX ADMIN — ONDANSETRON 4 MG: 2 INJECTION INTRAMUSCULAR; INTRAVENOUS at 05:44

## 2023-01-01 RX ADMIN — DIPHENHYDRAMINE HYDROCHLORIDE 25 MG: 50 INJECTION, SOLUTION INTRAMUSCULAR; INTRAVENOUS at 05:10

## 2023-01-01 RX ADMIN — HYDROMORPHONE HYDROCHLORIDE 0.5 MG: 1 INJECTION, SOLUTION INTRAMUSCULAR; INTRAVENOUS; SUBCUTANEOUS at 00:37

## 2023-01-01 RX ADMIN — HYDROXYCHLOROQUINE SULFATE 200 MG: 200 TABLET ORAL at 06:08

## 2023-01-01 RX ADMIN — ESCITALOPRAM OXALATE 10 MG: 10 TABLET ORAL at 10:09

## 2023-01-01 RX ADMIN — ONDANSETRON 4 MG: 2 INJECTION INTRAMUSCULAR; INTRAVENOUS at 00:06

## 2023-01-01 RX ADMIN — LIDOCAINE HYDROCHLORIDE 1 ML: 10 INJECTION, SOLUTION INFILTRATION; PERINEURAL at 14:05

## 2023-01-01 RX ADMIN — LORAZEPAM 0.5 MG: 2 INJECTION INTRAMUSCULAR; INTRAVENOUS at 02:01

## 2023-01-01 RX ADMIN — DEXTROSE MONOHYDRATE 25 G: 100 INJECTION, SOLUTION INTRAVENOUS at 08:55

## 2023-01-01 RX ADMIN — SENNOSIDES AND DOCUSATE SODIUM 2 TABLET: 50; 8.6 TABLET ORAL at 17:07

## 2023-01-01 RX ADMIN — ESCITALOPRAM OXALATE 10 MG: 10 TABLET ORAL at 06:09

## 2023-01-01 RX ADMIN — MAGNESIUM SULFATE HEPTAHYDRATE 2 G: 2 INJECTION, SOLUTION INTRAVENOUS at 11:45

## 2023-01-01 RX ADMIN — CARVEDILOL 3.12 MG: 3.12 TABLET, FILM COATED ORAL at 08:18

## 2023-01-01 RX ADMIN — DIPHENHYDRAMINE HYDROCHLORIDE 25 MG: 50 INJECTION, SOLUTION INTRAMUSCULAR; INTRAVENOUS at 23:30

## 2023-01-01 RX ADMIN — ACYCLOVIR 200 MG: 200 CAPSULE ORAL at 13:27

## 2023-01-01 RX ADMIN — DEXAMETHASONE SODIUM PHOSPHATE 0.76 MG: 4 INJECTION, SOLUTION INTRAMUSCULAR; INTRAVENOUS at 06:31

## 2023-01-01 RX ADMIN — ONDANSETRON 4 MG: 2 INJECTION INTRAMUSCULAR; INTRAVENOUS at 21:59

## 2023-01-01 RX ADMIN — OLANZAPINE 2.5 MG: 5 TABLET, FILM COATED ORAL at 17:50

## 2023-01-01 RX ADMIN — DIPHENHYDRAMINE HYDROCHLORIDE 25 MG: 50 INJECTION INTRAMUSCULAR; INTRAVENOUS at 16:22

## 2023-01-01 RX ADMIN — DEXTROSE MONOHYDRATE 25 G: 100 INJECTION, SOLUTION INTRAVENOUS at 12:29

## 2023-01-01 RX ADMIN — MYCOPHENOLATE MOFETIL 500 MG: 250 CAPSULE ORAL at 21:24

## 2023-01-01 RX ADMIN — LORAZEPAM 0.5 MG: 2 INJECTION INTRAMUSCULAR; INTRAVENOUS at 04:16

## 2023-01-01 RX ADMIN — EPOETIN ALFA 10000 UNITS: 10000 SOLUTION INTRAVENOUS; SUBCUTANEOUS at 17:02

## 2023-01-01 RX ADMIN — HYDROMORPHONE HYDROCHLORIDE 1 MG: 1 INJECTION, SOLUTION INTRAMUSCULAR; INTRAVENOUS; SUBCUTANEOUS at 22:55

## 2023-01-01 RX ADMIN — LORAZEPAM 0.5 MG: 2 INJECTION INTRAMUSCULAR; INTRAVENOUS at 10:53

## 2023-01-01 RX ADMIN — EPOETIN ALFA 10000 UNITS: 10000 SOLUTION INTRAVENOUS; SUBCUTANEOUS at 11:47

## 2023-01-01 RX ADMIN — ONDANSETRON 4 MG: 2 INJECTION INTRAMUSCULAR; INTRAVENOUS at 11:45

## 2023-01-01 RX ADMIN — HYDROMORPHONE HYDROCHLORIDE 1 MG: 1 INJECTION, SOLUTION INTRAMUSCULAR; INTRAVENOUS; SUBCUTANEOUS at 17:22

## 2023-01-01 RX ADMIN — THIAMINE HYDROCHLORIDE 100 MG: 100 INJECTION, SOLUTION INTRAMUSCULAR; INTRAVENOUS at 10:51

## 2023-01-01 RX ADMIN — DEXTROSE MONOHYDRATE 50 ML: 25 INJECTION, SOLUTION INTRAVENOUS at 22:53

## 2023-01-01 RX ADMIN — FOLIC ACID 1 MG: 1 TABLET ORAL at 05:17

## 2023-01-01 RX ADMIN — LORAZEPAM 0.5 MG: 2 INJECTION INTRAMUSCULAR; INTRAVENOUS at 10:06

## 2023-01-01 RX ADMIN — HYDROMORPHONE HYDROCHLORIDE 0.5 MG: 1 INJECTION, SOLUTION INTRAMUSCULAR; INTRAVENOUS; SUBCUTANEOUS at 00:29

## 2023-01-01 RX ADMIN — DIPHENHYDRAMINE HYDROCHLORIDE 25 MG: 50 INJECTION, SOLUTION INTRAMUSCULAR; INTRAVENOUS at 06:22

## 2023-01-01 RX ADMIN — PREDNISONE 5 MG: 5 TABLET ORAL at 09:22

## 2023-01-01 RX ADMIN — LOSARTAN POTASSIUM 25 MG: 25 TABLET, FILM COATED ORAL at 09:57

## 2023-01-01 RX ADMIN — DIPHENHYDRAMINE HYDROCHLORIDE 25 MG: 50 INJECTION, SOLUTION INTRAMUSCULAR; INTRAVENOUS at 19:27

## 2023-01-01 RX ADMIN — SENNOSIDES AND DOCUSATE SODIUM 2 TABLET: 50; 8.6 TABLET ORAL at 18:29

## 2023-01-01 RX ADMIN — ONDANSETRON 4 MG: 2 INJECTION INTRAMUSCULAR; INTRAVENOUS at 10:22

## 2023-01-01 RX ADMIN — SENNOSIDES AND DOCUSATE SODIUM 2 TABLET: 50; 8.6 TABLET ORAL at 16:58

## 2023-01-01 RX ADMIN — LORAZEPAM 0.5 MG: 2 INJECTION INTRAMUSCULAR; INTRAVENOUS at 21:36

## 2023-01-01 RX ADMIN — LOSARTAN POTASSIUM 25 MG: 25 TABLET, FILM COATED ORAL at 09:05

## 2023-01-01 RX ADMIN — LORAZEPAM 0.5 MG: 2 INJECTION INTRAMUSCULAR; INTRAVENOUS at 23:45

## 2023-01-01 RX ADMIN — CARVEDILOL 3.12 MG: 3.12 TABLET, FILM COATED ORAL at 18:13

## 2023-01-01 RX ADMIN — Medication 220 MG: at 10:49

## 2023-01-01 RX ADMIN — HYDROMORPHONE HYDROCHLORIDE 0.5 MG: 1 INJECTION, SOLUTION INTRAMUSCULAR; INTRAVENOUS; SUBCUTANEOUS at 20:50

## 2023-01-01 RX ADMIN — DIPHENHYDRAMINE HYDROCHLORIDE 25 MG: 50 INJECTION, SOLUTION INTRAMUSCULAR; INTRAVENOUS at 05:05

## 2023-01-01 RX ADMIN — CARVEDILOL 3.12 MG: 3.12 TABLET, FILM COATED ORAL at 08:42

## 2023-01-01 RX ADMIN — LORAZEPAM 0.5 MG: 2 INJECTION INTRAMUSCULAR; INTRAVENOUS at 20:49

## 2023-01-01 RX ADMIN — LORAZEPAM 0.5 MG: 2 INJECTION INTRAMUSCULAR; INTRAVENOUS at 01:43

## 2023-01-01 RX ADMIN — PIPERACILLIN AND TAZOBACTAM 4.5 G: 4; .5 INJECTION, POWDER, FOR SOLUTION INTRAVENOUS at 10:17

## 2023-01-01 RX ADMIN — LOSARTAN POTASSIUM 25 MG: 50 TABLET, FILM COATED ORAL at 12:33

## 2023-01-01 RX ADMIN — LORAZEPAM 0.5 MG: 2 INJECTION INTRAMUSCULAR; INTRAVENOUS at 17:16

## 2023-01-01 RX ADMIN — ONDANSETRON 4 MG: 2 INJECTION INTRAMUSCULAR; INTRAVENOUS at 20:46

## 2023-01-01 RX ADMIN — MULTIPLE VITAMINS W/ MINERALS TAB 1 TABLET: TAB at 09:24

## 2023-01-01 RX ADMIN — OMEPRAZOLE 20 MG: 20 CAPSULE, DELAYED RELEASE ORAL at 09:06

## 2023-01-01 RX ADMIN — DIPHENHYDRAMINE HYDROCHLORIDE 25 MG: 50 INJECTION, SOLUTION INTRAMUSCULAR; INTRAVENOUS at 18:14

## 2023-01-01 RX ADMIN — DRONABINOL 2.5 MG: 2.5 CAPSULE ORAL at 10:38

## 2023-01-01 RX ADMIN — HYDROMORPHONE HYDROCHLORIDE 1 MG: 1 INJECTION, SOLUTION INTRAMUSCULAR; INTRAVENOUS; SUBCUTANEOUS at 09:01

## 2023-01-01 RX ADMIN — THIAMINE HYDROCHLORIDE 100 MG: 100 INJECTION, SOLUTION INTRAMUSCULAR; INTRAVENOUS at 13:01

## 2023-01-01 RX ADMIN — ZINC SULFATE: 25 INJECTION, SOLUTION INTRAVENOUS at 20:00

## 2023-01-01 RX ADMIN — CARVEDILOL 3.12 MG: 3.12 TABLET, FILM COATED ORAL at 08:41

## 2023-01-01 RX ADMIN — OMEPRAZOLE 20 MG: 20 CAPSULE, DELAYED RELEASE ORAL at 18:16

## 2023-01-01 RX ADMIN — LOSARTAN POTASSIUM 25 MG: 25 TABLET, FILM COATED ORAL at 08:42

## 2023-01-01 RX ADMIN — SENNOSIDES AND DOCUSATE SODIUM 2 TABLET: 50; 8.6 TABLET ORAL at 17:08

## 2023-01-01 RX ADMIN — Medication 100 MG: at 06:09

## 2023-01-01 RX ADMIN — HYDROMORPHONE HYDROCHLORIDE 0.5 MG: 1 INJECTION, SOLUTION INTRAMUSCULAR; INTRAVENOUS; SUBCUTANEOUS at 18:03

## 2023-01-01 RX ADMIN — PREGABALIN 25 MG: 25 CAPSULE ORAL at 08:42

## 2023-01-01 RX ADMIN — ONDANSETRON 4 MG: 2 INJECTION INTRAMUSCULAR; INTRAVENOUS at 02:01

## 2023-01-01 RX ADMIN — DIPHENHYDRAMINE HYDROCHLORIDE 25 MG: 50 INJECTION, SOLUTION INTRAMUSCULAR; INTRAVENOUS at 16:22

## 2023-01-01 RX ADMIN — HYDROMORPHONE HYDROCHLORIDE 0.5 MG: 1 INJECTION, SOLUTION INTRAMUSCULAR; INTRAVENOUS; SUBCUTANEOUS at 20:11

## 2023-01-01 RX ADMIN — DIPHENHYDRAMINE HYDROCHLORIDE 25 MG: 50 INJECTION, SOLUTION INTRAMUSCULAR; INTRAVENOUS at 19:19

## 2023-01-01 RX ADMIN — DEXTROSE MONOHYDRATE 50 ML: 25 INJECTION, SOLUTION INTRAVENOUS at 04:41

## 2023-01-01 RX ADMIN — LORAZEPAM 0.5 MG: 2 INJECTION INTRAMUSCULAR; INTRAVENOUS at 15:59

## 2023-01-01 RX ADMIN — HYDROMORPHONE HYDROCHLORIDE 0.5 MG: 1 INJECTION, SOLUTION INTRAMUSCULAR; INTRAVENOUS; SUBCUTANEOUS at 09:41

## 2023-01-01 RX ADMIN — PREDNISONE 5 MG: 10 TABLET ORAL at 05:12

## 2023-01-01 RX ADMIN — CLONIDINE 1 PATCH: 0.2 PATCH TRANSDERMAL at 21:17

## 2023-01-01 RX ADMIN — DRONABINOL 2.5 MG: 2.5 CAPSULE ORAL at 17:10

## 2023-01-01 RX ADMIN — DEXTROSE MONOHYDRATE 25 G: 100 INJECTION, SOLUTION INTRAVENOUS at 09:05

## 2023-01-01 RX ADMIN — PREDNISONE 5 MG: 10 TABLET ORAL at 21:23

## 2023-01-01 RX ADMIN — DEXTROSE MONOHYDRATE 25 G: 100 INJECTION, SOLUTION INTRAVENOUS at 23:19

## 2023-01-01 RX ADMIN — Medication 220 MG: at 05:38

## 2023-01-01 RX ADMIN — Medication 100 MG: at 05:21

## 2023-01-01 RX ADMIN — HYDROMORPHONE HYDROCHLORIDE 1 MG: 1 INJECTION, SOLUTION INTRAMUSCULAR; INTRAVENOUS; SUBCUTANEOUS at 12:59

## 2023-01-01 RX ADMIN — AMLODIPINE BESYLATE 10 MG: 5 TABLET ORAL at 10:12

## 2023-01-01 RX ADMIN — SODIUM CHLORIDE: 4 INJECTION, SOLUTION, CONCENTRATE INTRAVENOUS at 00:58

## 2023-01-01 RX ADMIN — HYDROMORPHONE HYDROCHLORIDE 2 MG: 2 TABLET ORAL at 01:33

## 2023-01-01 RX ADMIN — LOSARTAN POTASSIUM 25 MG: 50 TABLET, FILM COATED ORAL at 21:16

## 2023-01-01 RX ADMIN — ACYCLOVIR 400 MG: 800 TABLET ORAL at 17:50

## 2023-01-01 RX ADMIN — HYDROMORPHONE HYDROCHLORIDE 0.5 MG: 1 INJECTION, SOLUTION INTRAMUSCULAR; INTRAVENOUS; SUBCUTANEOUS at 20:44

## 2023-01-01 RX ADMIN — LORAZEPAM 0.5 MG: 2 INJECTION INTRAMUSCULAR; INTRAVENOUS at 10:50

## 2023-01-01 RX ADMIN — PREDNISONE 5 MG: 5 TABLET ORAL at 05:19

## 2023-01-01 RX ADMIN — PREGABALIN 25 MG: 25 CAPSULE ORAL at 09:58

## 2023-01-01 RX ADMIN — DIPHENHYDRAMINE HYDROCHLORIDE 25 MG: 50 INJECTION, SOLUTION INTRAMUSCULAR; INTRAVENOUS at 21:29

## 2023-01-01 RX ADMIN — LIDOCAINE HYDROCHLORIDE 1 ML: 10 INJECTION, SOLUTION INFILTRATION; PERINEURAL at 11:23

## 2023-01-01 RX ADMIN — LORAZEPAM 0.5 MG: 2 INJECTION INTRAMUSCULAR; INTRAVENOUS at 07:32

## 2023-01-01 RX ADMIN — OMEPRAZOLE 20 MG: 20 CAPSULE, DELAYED RELEASE ORAL at 06:08

## 2023-01-01 RX ADMIN — MYCOPHENOLATE MOFETIL 500 MG: 200 POWDER, FOR SUSPENSION ORAL at 05:20

## 2023-01-01 RX ADMIN — ONDANSETRON 4 MG: 2 INJECTION INTRAMUSCULAR; INTRAVENOUS at 20:19

## 2023-01-01 RX ADMIN — HYDROMORPHONE HYDROCHLORIDE 1 MG: 1 INJECTION, SOLUTION INTRAMUSCULAR; INTRAVENOUS; SUBCUTANEOUS at 17:07

## 2023-01-01 RX ADMIN — DEXTROSE MONOHYDRATE 25 G: 100 INJECTION, SOLUTION INTRAVENOUS at 06:28

## 2023-01-01 RX ADMIN — ONDANSETRON 4 MG: 2 INJECTION INTRAMUSCULAR; INTRAVENOUS at 19:00

## 2023-01-01 RX ADMIN — HYDROMORPHONE HYDROCHLORIDE 0.5 MG: 1 INJECTION, SOLUTION INTRAMUSCULAR; INTRAVENOUS; SUBCUTANEOUS at 06:51

## 2023-01-01 RX ADMIN — LORAZEPAM 0.5 MG: 2 INJECTION INTRAMUSCULAR; INTRAVENOUS at 14:11

## 2023-01-01 RX ADMIN — ONDANSETRON 4 MG: 2 INJECTION INTRAMUSCULAR; INTRAVENOUS at 17:49

## 2023-01-01 RX ADMIN — DEXTROSE MONOHYDRATE 50 ML: 25 INJECTION, SOLUTION INTRAVENOUS at 11:04

## 2023-01-01 RX ADMIN — HYDRALAZINE HYDROCHLORIDE 10 MG: 20 INJECTION, SOLUTION INTRAMUSCULAR; INTRAVENOUS at 17:22

## 2023-01-01 RX ADMIN — HYDROMORPHONE HYDROCHLORIDE 0.5 MG: 1 INJECTION, SOLUTION INTRAMUSCULAR; INTRAVENOUS; SUBCUTANEOUS at 16:57

## 2023-01-01 RX ADMIN — HYDROMORPHONE HYDROCHLORIDE 1 MG: 1 INJECTION, SOLUTION INTRAMUSCULAR; INTRAVENOUS; SUBCUTANEOUS at 20:49

## 2023-01-01 RX ADMIN — LORAZEPAM 0.5 MG: 2 INJECTION INTRAMUSCULAR; INTRAVENOUS at 16:41

## 2023-01-01 RX ADMIN — SODIUM CHLORIDE 1 G: 1 TABLET ORAL at 18:20

## 2023-01-01 RX ADMIN — ONDANSETRON 4 MG: 2 INJECTION INTRAMUSCULAR; INTRAVENOUS at 17:07

## 2023-01-01 RX ADMIN — LORAZEPAM 0.5 MG: 2 INJECTION INTRAMUSCULAR; INTRAVENOUS at 10:00

## 2023-01-01 RX ADMIN — PREDNISONE 5 MG: 5 TABLET ORAL at 05:17

## 2023-01-01 RX ADMIN — PIPERACILLIN AND TAZOBACTAM 4.5 G: 4; .5 INJECTION, POWDER, FOR SOLUTION INTRAVENOUS at 11:53

## 2023-01-01 RX ADMIN — LORAZEPAM 0.5 MG: 2 INJECTION INTRAMUSCULAR; INTRAVENOUS at 12:40

## 2023-01-01 RX ADMIN — DEXTROSE MONOHYDRATE 25 G: 100 INJECTION, SOLUTION INTRAVENOUS at 00:55

## 2023-01-01 RX ADMIN — OXYCODONE HYDROCHLORIDE 5 MG: 5 TABLET ORAL at 17:25

## 2023-01-01 RX ADMIN — ONDANSETRON 4 MG: 2 INJECTION INTRAMUSCULAR; INTRAVENOUS at 16:14

## 2023-01-01 RX ADMIN — FOLIC ACID 1 MG: 1 TABLET ORAL at 09:05

## 2023-01-01 RX ADMIN — HYDROXYCHLOROQUINE SULFATE 200 MG: 200 TABLET ORAL at 21:24

## 2023-01-01 RX ADMIN — DEXTROSE MONOHYDRATE 25 G: 100 INJECTION, SOLUTION INTRAVENOUS at 13:36

## 2023-01-01 RX ADMIN — LOSARTAN POTASSIUM 25 MG: 25 TABLET, FILM COATED ORAL at 05:20

## 2023-01-01 RX ADMIN — PREDNISONE 5 MG: 5 TABLET ORAL at 17:18

## 2023-01-01 RX ADMIN — CARVEDILOL 3.12 MG: 3.12 TABLET, FILM COATED ORAL at 20:56

## 2023-01-01 RX ADMIN — DEXTROSE MONOHYDRATE: 100 INJECTION, SOLUTION INTRAVENOUS at 12:53

## 2023-01-01 RX ADMIN — LORAZEPAM 0.5 MG: 2 INJECTION INTRAMUSCULAR; INTRAVENOUS at 10:16

## 2023-01-01 RX ADMIN — HYDROMORPHONE HYDROCHLORIDE 1 MG: 1 INJECTION, SOLUTION INTRAMUSCULAR; INTRAVENOUS; SUBCUTANEOUS at 23:25

## 2023-01-01 RX ADMIN — MORPHINE SULFATE 4 MG: 4 INJECTION INTRAVENOUS at 15:11

## 2023-01-01 RX ADMIN — SODIUM CHLORIDE: 4 INJECTION, SOLUTION, CONCENTRATE INTRAVENOUS at 09:00

## 2023-01-01 RX ADMIN — COSYNTROPIN 250 MCG: 0.25 INJECTION, POWDER, LYOPHILIZED, FOR SOLUTION INTRAMUSCULAR; INTRAVENOUS at 08:22

## 2023-01-01 RX ADMIN — FOLIC ACID 1 MG: 1 TABLET ORAL at 06:08

## 2023-01-01 RX ADMIN — METHYLPREDNISOLONE SODIUM SUCCINATE 125 MG: 125 INJECTION, POWDER, FOR SOLUTION INTRAMUSCULAR; INTRAVENOUS at 16:19

## 2023-01-01 RX ADMIN — CARVEDILOL 3.12 MG: 3.12 TABLET, FILM COATED ORAL at 09:23

## 2023-01-01 RX ADMIN — LORAZEPAM 0.5 MG: 2 INJECTION INTRAMUSCULAR; INTRAVENOUS at 21:42

## 2023-01-01 RX ADMIN — HYDROMORPHONE HYDROCHLORIDE 0.5 MG: 1 INJECTION, SOLUTION INTRAMUSCULAR; INTRAVENOUS; SUBCUTANEOUS at 17:11

## 2023-01-01 RX ADMIN — Medication 100 MG: at 05:38

## 2023-01-01 RX ADMIN — DIPHENHYDRAMINE HYDROCHLORIDE 25 MG: 50 INJECTION, SOLUTION INTRAMUSCULAR; INTRAVENOUS at 18:38

## 2023-01-01 RX ADMIN — DEXTROSE MONOHYDRATE 25 G: 100 INJECTION, SOLUTION INTRAVENOUS at 17:48

## 2023-01-01 RX ADMIN — HYDROMORPHONE HYDROCHLORIDE 0.5 MG: 1 INJECTION, SOLUTION INTRAMUSCULAR; INTRAVENOUS; SUBCUTANEOUS at 21:35

## 2023-01-01 RX ADMIN — PREDNISONE 5 MG: 5 TABLET ORAL at 05:32

## 2023-01-01 RX ADMIN — PIPERACILLIN AND TAZOBACTAM 4.5 G: 4; .5 INJECTION, POWDER, FOR SOLUTION INTRAVENOUS at 17:21

## 2023-01-01 RX ADMIN — Medication 220 MG: at 05:19

## 2023-01-01 RX ADMIN — HYDROXYCHLOROQUINE SULFATE 200 MG: 200 TABLET ORAL at 15:05

## 2023-01-01 RX ADMIN — OMEPRAZOLE 20 MG: 20 CAPSULE, DELAYED RELEASE ORAL at 05:20

## 2023-01-01 RX ADMIN — ONDANSETRON 4 MG: 2 INJECTION INTRAMUSCULAR; INTRAVENOUS at 22:16

## 2023-01-01 RX ADMIN — HYDROMORPHONE HYDROCHLORIDE 0.5 MG: 1 INJECTION, SOLUTION INTRAMUSCULAR; INTRAVENOUS; SUBCUTANEOUS at 05:48

## 2023-01-01 RX ADMIN — DEXAMETHASONE SODIUM PHOSPHATE 0.76 MG: 4 INJECTION, SOLUTION INTRAMUSCULAR; INTRAVENOUS at 06:18

## 2023-01-01 RX ADMIN — FOLIC ACID 1 MG: 1 TABLET ORAL at 10:12

## 2023-01-01 RX ADMIN — DIPHENHYDRAMINE HYDROCHLORIDE 25 MG: 50 INJECTION, SOLUTION INTRAMUSCULAR; INTRAVENOUS at 01:42

## 2023-01-01 RX ADMIN — HYDROXYCHLOROQUINE SULFATE 200 MG: 200 TABLET ORAL at 18:23

## 2023-01-01 RX ADMIN — HYDROMORPHONE HYDROCHLORIDE 1 MG: 1 INJECTION, SOLUTION INTRAMUSCULAR; INTRAVENOUS; SUBCUTANEOUS at 05:04

## 2023-01-01 RX ADMIN — HYDROMORPHONE HYDROCHLORIDE 0.5 MG: 1 INJECTION, SOLUTION INTRAMUSCULAR; INTRAVENOUS; SUBCUTANEOUS at 12:32

## 2023-01-01 RX ADMIN — ZINC SULFATE: 25 INJECTION, SOLUTION INTRAVENOUS at 20:03

## 2023-01-01 RX ADMIN — DIPHENHYDRAMINE HYDROCHLORIDE 50 MG: 50 INJECTION INTRAMUSCULAR; INTRAVENOUS at 15:30

## 2023-01-01 RX ADMIN — OMEPRAZOLE 20 MG: 20 CAPSULE, DELAYED RELEASE ORAL at 05:16

## 2023-01-01 RX ADMIN — SODIUM CHLORIDE 1 G: 1 TABLET ORAL at 18:13

## 2023-01-01 RX ADMIN — LORAZEPAM 0.5 MG: 2 INJECTION INTRAMUSCULAR; INTRAVENOUS at 04:04

## 2023-01-01 RX ADMIN — DEXTROSE MONOHYDRATE 25 G: 100 INJECTION, SOLUTION INTRAVENOUS at 10:48

## 2023-01-01 RX ADMIN — CARVEDILOL 3.12 MG: 3.12 TABLET, FILM COATED ORAL at 17:58

## 2023-01-01 RX ADMIN — BISACODYL 10 MG: 10 SUPPOSITORY RECTAL at 17:20

## 2023-01-01 RX ADMIN — DEXAMETHASONE SODIUM PHOSPHATE 0.76 MG: 4 INJECTION, SOLUTION INTRAMUSCULAR; INTRAVENOUS at 06:05

## 2023-01-01 RX ADMIN — DIPHENHYDRAMINE HYDROCHLORIDE 25 MG: 50 INJECTION, SOLUTION INTRAMUSCULAR; INTRAVENOUS at 05:12

## 2023-01-01 RX ADMIN — HYDRALAZINE HYDROCHLORIDE 10 MG: 20 INJECTION, SOLUTION INTRAMUSCULAR; INTRAVENOUS at 08:36

## 2023-01-01 RX ADMIN — HYDROMORPHONE HYDROCHLORIDE 0.5 MG: 1 INJECTION, SOLUTION INTRAMUSCULAR; INTRAVENOUS; SUBCUTANEOUS at 15:37

## 2023-01-01 RX ADMIN — MYCOPHENOLATE MOFETIL 500 MG: 200 POWDER, FOR SUSPENSION ORAL at 10:16

## 2023-01-01 RX ADMIN — LIDOCAINE HYDROCHLORIDE 2 ML: 10 INJECTION, SOLUTION INFILTRATION; PERINEURAL at 11:28

## 2023-01-01 RX ADMIN — HYDROMORPHONE HYDROCHLORIDE 0.5 MG: 1 INJECTION, SOLUTION INTRAMUSCULAR; INTRAVENOUS; SUBCUTANEOUS at 20:01

## 2023-01-01 RX ADMIN — SODIUM CHLORIDE: 4 INJECTION, SOLUTION, CONCENTRATE INTRAVENOUS at 13:58

## 2023-01-01 RX ADMIN — LORAZEPAM 0.5 MG: 0.5 TABLET ORAL at 09:01

## 2023-01-01 RX ADMIN — DEXTROSE MONOHYDRATE 25 G: 100 INJECTION, SOLUTION INTRAVENOUS at 17:10

## 2023-01-01 RX ADMIN — LORAZEPAM 0.5 MG: 2 INJECTION INTRAMUSCULAR; INTRAVENOUS at 21:29

## 2023-01-01 RX ADMIN — ESCITALOPRAM OXALATE 10 MG: 10 TABLET ORAL at 09:22

## 2023-01-01 RX ADMIN — HYDROMORPHONE HYDROCHLORIDE 0.5 MG: 1 INJECTION, SOLUTION INTRAMUSCULAR; INTRAVENOUS; SUBCUTANEOUS at 21:43

## 2023-01-01 RX ADMIN — MIRTAZAPINE 15 MG: 15 TABLET, FILM COATED ORAL at 21:24

## 2023-01-01 RX ADMIN — ONDANSETRON 4 MG: 2 INJECTION INTRAMUSCULAR; INTRAVENOUS at 02:12

## 2023-01-01 RX ADMIN — PIPERACILLIN AND TAZOBACTAM 4.5 G: 4; .5 INJECTION, POWDER, FOR SOLUTION INTRAVENOUS at 22:57

## 2023-01-01 RX ADMIN — DIPHENHYDRAMINE HYDROCHLORIDE 25 MG: 50 INJECTION, SOLUTION INTRAMUSCULAR; INTRAVENOUS at 11:49

## 2023-01-01 RX ADMIN — CLONIDINE 1 PATCH: 0.2 PATCH TRANSDERMAL at 11:50

## 2023-01-01 RX ADMIN — PREDNISONE 5 MG: 10 TABLET ORAL at 21:16

## 2023-01-01 RX ADMIN — HYDROMORPHONE HYDROCHLORIDE 2 MG: 2 TABLET ORAL at 13:48

## 2023-01-01 RX ADMIN — HYDROMORPHONE HYDROCHLORIDE 1 MG: 1 INJECTION, SOLUTION INTRAMUSCULAR; INTRAVENOUS; SUBCUTANEOUS at 06:48

## 2023-01-01 RX ADMIN — LORAZEPAM 0.5 MG: 1 TABLET ORAL at 17:18

## 2023-01-01 RX ADMIN — SENNOSIDES AND DOCUSATE SODIUM 2 TABLET: 50; 8.6 TABLET ORAL at 17:13

## 2023-01-01 RX ADMIN — CARVEDILOL 3.12 MG: 3.12 TABLET, FILM COATED ORAL at 06:33

## 2023-01-01 RX ADMIN — DRONABINOL 2.5 MG: 2.5 CAPSULE ORAL at 11:24

## 2023-01-01 RX ADMIN — HYDROMORPHONE HYDROCHLORIDE 1 MG: 1 INJECTION, SOLUTION INTRAMUSCULAR; INTRAVENOUS; SUBCUTANEOUS at 04:42

## 2023-01-01 RX ADMIN — MULTIPLE VITAMINS W/ MINERALS TAB 1 TABLET: TAB at 05:17

## 2023-01-01 RX ADMIN — DEXTROSE MONOHYDRATE 50 ML: 25 INJECTION, SOLUTION INTRAVENOUS at 17:56

## 2023-01-01 RX ADMIN — ACYCLOVIR 800 MG: 800 TABLET ORAL at 11:00

## 2023-01-01 RX ADMIN — LORAZEPAM 0.5 MG: 2 INJECTION INTRAMUSCULAR; INTRAVENOUS at 00:21

## 2023-01-01 RX ADMIN — LANSOPRAZOLE 30 MG: 30 TABLET, ORALLY DISINTEGRATING, DELAYED RELEASE ORAL at 06:19

## 2023-01-01 ASSESSMENT — ENCOUNTER SYMPTOMS
BACK PAIN: 1
BACK PAIN: 1
VOMITING: 0
EYES NEGATIVE: 1
ABDOMINAL PAIN: 1
WEIGHT LOSS: 1
DIZZINESS: 0
EYES NEGATIVE: 1
HEADACHES: 0
CONSTIPATION: 1
WEIGHT LOSS: 1
HEMOPTYSIS: 0
CHILLS: 0
HEARTBURN: 0
BRUISES/BLEEDS EASILY: 0
NAUSEA: 1
WEIGHT LOSS: 1
FEVER: 0
SHORTNESS OF BREATH: 0
FOCAL WEAKNESS: 0
ABDOMINAL PAIN: 1
SINUS PAIN: 0
PALPITATIONS: 0
FOCAL WEAKNESS: 0
HEADACHES: 0
COUGH: 0
NECK PAIN: 1
NAUSEA: 1
MYALGIAS: 1
NECK PAIN: 0
BLURRED VISION: 0
VOMITING: 0
FOCAL WEAKNESS: 0
DEPRESSION: 0
CHILLS: 0
HEADACHES: 0
HEADACHES: 0
CHILLS: 0
PALPITATIONS: 0
EYES NEGATIVE: 1
HEARTBURN: 0
WHEEZING: 0
NECK PAIN: 1
NAUSEA: 1
FEVER: 0
VOMITING: 0
CHILLS: 0
NECK PAIN: 0
VOMITING: 0
NAUSEA: 1
NERVOUS/ANXIOUS: 1
WEAKNESS: 0
MYALGIAS: 0
NERVOUS/ANXIOUS: 1
VOMITING: 0
VOMITING: 1
BACK PAIN: 1
POLYDIPSIA: 0
COUGH: 0
POLYDIPSIA: 0
NERVOUS/ANXIOUS: 1
SORE THROAT: 1
WEIGHT LOSS: 1
PALPITATIONS: 0
COUGH: 0
NAUSEA: 1
WEAKNESS: 1
PALPITATIONS: 0
ABDOMINAL PAIN: 1
WEIGHT LOSS: 1
HEARTBURN: 0
DIZZINESS: 0
FEVER: 0
VOMITING: 0
DIZZINESS: 0
DIARRHEA: 0
MYALGIAS: 1
HEARTBURN: 0
CHILLS: 0
COUGH: 0
ABDOMINAL PAIN: 1
DEPRESSION: 0
BACK PAIN: 1
CARDIOVASCULAR NEGATIVE: 1
VOMITING: 0
COUGH: 0
MYALGIAS: 1
PALPITATIONS: 0
NECK PAIN: 1
BACK PAIN: 0
FOCAL WEAKNESS: 0
NECK PAIN: 1
BACK PAIN: 1
ABDOMINAL PAIN: 1
FEVER: 0
VOMITING: 0
HEADACHES: 0
NAUSEA: 1
HEADACHES: 1
ABDOMINAL PAIN: 1
PALPITATIONS: 0
WEIGHT LOSS: 1
NECK PAIN: 0
DIZZINESS: 0
NECK PAIN: 1
DIZZINESS: 0
MYALGIAS: 1
DEPRESSION: 0
COUGH: 0
BACK PAIN: 0
ORTHOPNEA: 0
DEPRESSION: 0
MYALGIAS: 1
ABDOMINAL PAIN: 1
NAUSEA: 1
VOMITING: 1
SHORTNESS OF BREATH: 0
PALPITATIONS: 0
HEADACHES: 0
COUGH: 0
SHORTNESS OF BREATH: 0
BACK PAIN: 1
BRUISES/BLEEDS EASILY: 0
BACK PAIN: 1
FOCAL WEAKNESS: 0
WEAKNESS: 1
NECK PAIN: 0
VOMITING: 0
WEAKNESS: 1
WEAKNESS: 1
COUGH: 0
DIZZINESS: 1
NAUSEA: 1
NERVOUS/ANXIOUS: 1
SINUS PAIN: 0
VOMITING: 0
RESPIRATORY NEGATIVE: 1
WEAKNESS: 1
WEAKNESS: 1
HEADACHES: 0
NAUSEA: 1
NAUSEA: 1
FOCAL WEAKNESS: 0
FOCAL WEAKNESS: 0
NAUSEA: 1
WEIGHT LOSS: 1
POLYDIPSIA: 0
PALPITATIONS: 0
WEAKNESS: 1
COUGH: 0
COUGH: 0
CHILLS: 0
RESPIRATORY NEGATIVE: 1
SHORTNESS OF BREATH: 1
FEVER: 0
EYES NEGATIVE: 1
MYALGIAS: 1
BACK PAIN: 1
WEAKNESS: 1
FEVER: 0
NECK PAIN: 0
WEIGHT LOSS: 1
SORE THROAT: 1
RESPIRATORY NEGATIVE: 1
WEAKNESS: 1
ABDOMINAL PAIN: 0
MYALGIAS: 1
NAUSEA: 0
BACK PAIN: 0
HEADACHES: 0
CHILLS: 0
PALPITATIONS: 0
COUGH: 0
BRUISES/BLEEDS EASILY: 0
DIZZINESS: 0
NAUSEA: 1
RESPIRATORY NEGATIVE: 1
WEAKNESS: 1
WEIGHT LOSS: 1
FEVER: 0
HEARTBURN: 0
SHORTNESS OF BREATH: 0
FEVER: 0
DIZZINESS: 0
NAUSEA: 1
NERVOUS/ANXIOUS: 1
WEIGHT LOSS: 1
NAUSEA: 1
COUGH: 0
COUGH: 0
BRUISES/BLEEDS EASILY: 0
FLANK PAIN: 0
CARDIOVASCULAR NEGATIVE: 1
NECK PAIN: 0
HEADACHES: 0
SHORTNESS OF BREATH: 0
WEIGHT LOSS: 1
WEAKNESS: 1
CARDIOVASCULAR NEGATIVE: 1
HEADACHES: 0
NERVOUS/ANXIOUS: 1
EYES NEGATIVE: 1
CHILLS: 0
ABDOMINAL PAIN: 1
BRUISES/BLEEDS EASILY: 0
ABDOMINAL PAIN: 0
MYALGIAS: 0
CARDIOVASCULAR NEGATIVE: 1
CHILLS: 0
ABDOMINAL PAIN: 1
WEIGHT LOSS: 1
NERVOUS/ANXIOUS: 1
NERVOUS/ANXIOUS: 1
BACK PAIN: 1
NECK PAIN: 0
POLYDIPSIA: 0
HEADACHES: 0
NAUSEA: 1
HEADACHES: 0
HEADACHES: 0
BACK PAIN: 1
NERVOUS/ANXIOUS: 1
NECK PAIN: 0
DIARRHEA: 0
WEIGHT LOSS: 1
HEADACHES: 0
SHORTNESS OF BREATH: 0
PALPITATIONS: 0
BACK PAIN: 0
VOMITING: 0
BACK PAIN: 0
VOMITING: 0
MYALGIAS: 0
PALPITATIONS: 0
BRUISES/BLEEDS EASILY: 0
CHILLS: 0
HEADACHES: 0
MYALGIAS: 1
VOMITING: 0
BRUISES/BLEEDS EASILY: 0
MYALGIAS: 1
VOMITING: 1
MYALGIAS: 1
EYES NEGATIVE: 1
NERVOUS/ANXIOUS: 1
FEVER: 0
CHILLS: 0
HEADACHES: 0
DEPRESSION: 0
WEIGHT LOSS: 1
WEIGHT LOSS: 1
RESPIRATORY NEGATIVE: 1
NERVOUS/ANXIOUS: 0
HEMOPTYSIS: 0
WEIGHT LOSS: 1
HEARTBURN: 0
ABDOMINAL PAIN: 0
VOMITING: 0
CONSTIPATION: 0
CARDIOVASCULAR NEGATIVE: 1
BLURRED VISION: 0
NERVOUS/ANXIOUS: 1
FEVER: 0
NAUSEA: 1
POLYDIPSIA: 0
NECK PAIN: 1
WEAKNESS: 1
VOMITING: 0
HEADACHES: 1
BACK PAIN: 0
NECK PAIN: 1
PALPITATIONS: 0
WEIGHT LOSS: 1
POLYDIPSIA: 0
BACK PAIN: 1
COUGH: 0
NAUSEA: 1
PALPITATIONS: 0
WEAKNESS: 1
CONSTIPATION: 0
PALPITATIONS: 0
FOCAL WEAKNESS: 0
NAUSEA: 0
MYALGIAS: 1
COUGH: 0
WEAKNESS: 1
NAUSEA: 1
NERVOUS/ANXIOUS: 1
MYALGIAS: 1
MYALGIAS: 1
FOCAL WEAKNESS: 0
VOMITING: 0
MYALGIAS: 0
NECK PAIN: 0
CONSTIPATION: 0
WEIGHT LOSS: 1
FEVER: 0
WEAKNESS: 1
MYALGIAS: 1
EYES NEGATIVE: 1
WEIGHT LOSS: 1
WEAKNESS: 1
PALPITATIONS: 0
DEPRESSION: 0
ABDOMINAL PAIN: 1
MYALGIAS: 1
MYALGIAS: 1
HEMOPTYSIS: 0
SHORTNESS OF BREATH: 0
RESPIRATORY NEGATIVE: 1
FEVER: 0
WEIGHT LOSS: 1
WHEEZING: 0
COUGH: 0
DIARRHEA: 0
DIZZINESS: 0
DIARRHEA: 0
MYALGIAS: 0
WEAKNESS: 1
EYES NEGATIVE: 1
DEPRESSION: 0
RESPIRATORY NEGATIVE: 1
WEIGHT LOSS: 1
SHORTNESS OF BREATH: 0
VOMITING: 0
MYALGIAS: 0
DEPRESSION: 0
WEAKNESS: 1
CHILLS: 0
BACK PAIN: 0
EYES NEGATIVE: 1
HEARTBURN: 0
FOCAL WEAKNESS: 0
FEVER: 0
EYES NEGATIVE: 1
NECK PAIN: 0
CARDIOVASCULAR NEGATIVE: 1
CHILLS: 0
NECK PAIN: 0
DIZZINESS: 0
COUGH: 0
NAUSEA: 0
VOMITING: 0
WEAKNESS: 1
SINUS PAIN: 0
DIZZINESS: 0
NECK PAIN: 1
WEAKNESS: 1
ORTHOPNEA: 0
DIZZINESS: 0
BACK PAIN: 1
BRUISES/BLEEDS EASILY: 0
NECK PAIN: 0
NAUSEA: 0
SHORTNESS OF BREATH: 1
CARDIOVASCULAR NEGATIVE: 1
MYALGIAS: 1
FOCAL WEAKNESS: 0
POLYDIPSIA: 0
FOCAL WEAKNESS: 0
DEPRESSION: 0
ABDOMINAL PAIN: 1
WHEEZING: 0
SPUTUM PRODUCTION: 0
VOMITING: 0
SHORTNESS OF BREATH: 0
BLOOD IN STOOL: 0
NERVOUS/ANXIOUS: 1
VOMITING: 1
WEIGHT LOSS: 1
CARDIOVASCULAR NEGATIVE: 1
CHILLS: 0
HEARTBURN: 0
WEIGHT LOSS: 1
WEAKNESS: 1
NERVOUS/ANXIOUS: 1
FEVER: 0
BACK PAIN: 0
DOUBLE VISION: 0
ABDOMINAL PAIN: 0
DIARRHEA: 0
NECK PAIN: 0
RESPIRATORY NEGATIVE: 1
POLYDIPSIA: 0
COUGH: 0
CHILLS: 0
ABDOMINAL PAIN: 0
BACK PAIN: 1
FOCAL WEAKNESS: 0
POLYDIPSIA: 0
CHILLS: 0
HEARTBURN: 0
ABDOMINAL PAIN: 1
BRUISES/BLEEDS EASILY: 0
NAUSEA: 1
VOMITING: 1
MYALGIAS: 1
HEADACHES: 0
VOMITING: 0
SHORTNESS OF BREATH: 0
WEIGHT LOSS: 1
EYES NEGATIVE: 1
FEVER: 0
DIAPHORESIS: 0
RESPIRATORY NEGATIVE: 1
DIZZINESS: 0
CARDIOVASCULAR NEGATIVE: 1
NERVOUS/ANXIOUS: 1
HEADACHES: 0
BACK PAIN: 0
DIZZINESS: 0
NECK PAIN: 0
SHORTNESS OF BREATH: 0
DIZZINESS: 0
ABDOMINAL PAIN: 0
NAUSEA: 1
VOMITING: 0
BACK PAIN: 1
NAUSEA: 1
FEVER: 0
COUGH: 0
ABDOMINAL PAIN: 0
SHORTNESS OF BREATH: 0
ABDOMINAL PAIN: 1
NERVOUS/ANXIOUS: 1
COUGH: 0

## 2023-01-01 ASSESSMENT — PAIN DESCRIPTION - PAIN TYPE
TYPE: ACUTE PAIN
TYPE: ACUTE PAIN
TYPE: ACUTE PAIN;CHRONIC PAIN
TYPE: ACUTE PAIN
TYPE: ACUTE PAIN;CHRONIC PAIN
TYPE: CHRONIC PAIN
TYPE: CHRONIC PAIN
TYPE: ACUTE PAIN
TYPE: ACUTE PAIN;CHRONIC PAIN
TYPE: CHRONIC PAIN
TYPE: ACUTE PAIN
TYPE: CHRONIC PAIN
TYPE: ACUTE PAIN
TYPE: CHRONIC PAIN
TYPE: ACUTE PAIN
TYPE: ACUTE PAIN;CHRONIC PAIN
TYPE: ACUTE PAIN
TYPE: CHRONIC PAIN
TYPE: CHRONIC PAIN
TYPE: ACUTE PAIN;CHRONIC PAIN
TYPE: ACUTE PAIN
TYPE: ACUTE PAIN;CHRONIC PAIN
TYPE: ACUTE PAIN
TYPE: ACUTE PAIN
TYPE: CHRONIC PAIN
TYPE: CHRONIC PAIN
TYPE: ACUTE PAIN;CHRONIC PAIN
TYPE: CHRONIC PAIN
TYPE: ACUTE PAIN
TYPE: ACUTE PAIN
TYPE: CHRONIC PAIN
TYPE: ACUTE PAIN
TYPE: ACUTE PAIN;CHRONIC PAIN
TYPE: ACUTE PAIN;CHRONIC PAIN
TYPE: ACUTE PAIN
TYPE: ACUTE PAIN;CHRONIC PAIN
TYPE: ACUTE PAIN
TYPE: CHRONIC PAIN
TYPE: ACUTE PAIN
TYPE: ACUTE PAIN
TYPE: CHRONIC PAIN
TYPE: ACUTE PAIN;CHRONIC PAIN
TYPE: ACUTE PAIN;CHRONIC PAIN
TYPE: ACUTE PAIN
TYPE: ACUTE PAIN
TYPE: ACUTE PAIN;CHRONIC PAIN
TYPE: ACUTE PAIN
TYPE: ACUTE PAIN
TYPE: ACUTE PAIN;CHRONIC PAIN
TYPE: CHRONIC PAIN
TYPE: ACUTE PAIN;CHRONIC PAIN
TYPE: ACUTE PAIN;CHRONIC PAIN
TYPE: ACUTE PAIN
TYPE: ACUTE PAIN;CHRONIC PAIN
TYPE: ACUTE PAIN
TYPE: ACUTE PAIN;CHRONIC PAIN
TYPE: ACUTE PAIN;CHRONIC PAIN
TYPE: ACUTE PAIN
TYPE: ACUTE PAIN;CHRONIC PAIN
TYPE: ACUTE PAIN
TYPE: ACUTE PAIN;CHRONIC PAIN
TYPE: ACUTE PAIN
TYPE: ACUTE PAIN
TYPE: CHRONIC PAIN
TYPE: ACUTE PAIN
TYPE: ACUTE PAIN
TYPE: ACUTE PAIN;CHRONIC PAIN
TYPE: ACUTE PAIN
TYPE: ACUTE PAIN;CHRONIC PAIN
TYPE: ACUTE PAIN
TYPE: ACUTE PAIN
TYPE: ACUTE PAIN;CHRONIC PAIN
TYPE: ACUTE PAIN
TYPE: CHRONIC PAIN
TYPE: CHRONIC PAIN
TYPE: ACUTE PAIN;CHRONIC PAIN
TYPE: CHRONIC PAIN

## 2023-01-01 ASSESSMENT — COGNITIVE AND FUNCTIONAL STATUS - GENERAL
SUGGESTED CMS G CODE MODIFIER DAILY ACTIVITY: CK
MOBILITY SCORE: 9
STANDING UP FROM CHAIR USING ARMS: A LITTLE
TOILETING: A LOT
STANDING UP FROM CHAIR USING ARMS: TOTAL
SUGGESTED CMS G CODE MODIFIER MOBILITY: CL
WALKING IN HOSPITAL ROOM: TOTAL
DRESSING REGULAR UPPER BODY CLOTHING: A LOT
MOVING TO AND FROM BED TO CHAIR: A LITTLE
MOVING FROM LYING ON BACK TO SITTING ON SIDE OF FLAT BED: A LITTLE
SUGGESTED CMS G CODE MODIFIER MOBILITY: CK
TOILETING: A LITTLE
CLIMB 3 TO 5 STEPS WITH RAILING: TOTAL
SUGGESTED CMS G CODE MODIFIER MOBILITY: CM
DRESSING REGULAR LOWER BODY CLOTHING: A LITTLE
EATING MEALS: A LITTLE
DAILY ACTIVITIY SCORE: 19
HELP NEEDED FOR BATHING: A LOT
CLIMB 3 TO 5 STEPS WITH RAILING: TOTAL
WALKING IN HOSPITAL ROOM: A LOT
TURNING FROM BACK TO SIDE WHILE IN FLAT BAD: A LITTLE
MOVING FROM LYING ON BACK TO SITTING ON SIDE OF FLAT BED: UNABLE
MOBILITY SCORE: 18
PERSONAL GROOMING: A LITTLE
DRESSING REGULAR UPPER BODY CLOTHING: A LITTLE
WALKING IN HOSPITAL ROOM: A LITTLE
MOVING TO AND FROM BED TO CHAIR: A LITTLE
SUGGESTED CMS G CODE MODIFIER DAILY ACTIVITY: CK
DAILY ACTIVITIY SCORE: 15
MOVING TO AND FROM BED TO CHAIR: UNABLE
STANDING UP FROM CHAIR USING ARMS: A LOT
HELP NEEDED FOR BATHING: A LOT
CLIMB 3 TO 5 STEPS WITH RAILING: A LOT
DRESSING REGULAR LOWER BODY CLOTHING: A LITTLE
MOBILITY SCORE: 12
MOVING FROM LYING ON BACK TO SITTING ON SIDE OF FLAT BED: UNABLE

## 2023-01-01 ASSESSMENT — PATIENT HEALTH QUESTIONNAIRE - PHQ9
1. LITTLE INTEREST OR PLEASURE IN DOING THINGS: NOT AT ALL
SUM OF ALL RESPONSES TO PHQ9 QUESTIONS 1 AND 2: 0
1. LITTLE INTEREST OR PLEASURE IN DOING THINGS: NOT AT ALL
SUM OF ALL RESPONSES TO PHQ9 QUESTIONS 1 AND 2: 0
1. LITTLE INTEREST OR PLEASURE IN DOING THINGS: NOT AT ALL
SUM OF ALL RESPONSES TO PHQ9 QUESTIONS 1 AND 2: 0
SUM OF ALL RESPONSES TO PHQ9 QUESTIONS 1 AND 2: 0
2. FEELING DOWN, DEPRESSED, IRRITABLE, OR HOPELESS: NOT AT ALL
1. LITTLE INTEREST OR PLEASURE IN DOING THINGS: NOT AT ALL
1. LITTLE INTEREST OR PLEASURE IN DOING THINGS: NOT AT ALL
2. FEELING DOWN, DEPRESSED, IRRITABLE, OR HOPELESS: NOT AT ALL
SUM OF ALL RESPONSES TO PHQ9 QUESTIONS 1 AND 2: 0
1. LITTLE INTEREST OR PLEASURE IN DOING THINGS: NOT AT ALL
SUM OF ALL RESPONSES TO PHQ9 QUESTIONS 1 AND 2: 0
1. LITTLE INTEREST OR PLEASURE IN DOING THINGS: NOT AT ALL
2. FEELING DOWN, DEPRESSED, IRRITABLE, OR HOPELESS: NOT AT ALL
2. FEELING DOWN, DEPRESSED, IRRITABLE, OR HOPELESS: NOT AT ALL
SUM OF ALL RESPONSES TO PHQ9 QUESTIONS 1 AND 2: 0
1. LITTLE INTEREST OR PLEASURE IN DOING THINGS: NOT AT ALL
2. FEELING DOWN, DEPRESSED, IRRITABLE, OR HOPELESS: NOT AT ALL
1. LITTLE INTEREST OR PLEASURE IN DOING THINGS: NOT AT ALL
SUM OF ALL RESPONSES TO PHQ9 QUESTIONS 1 AND 2: 0
1. LITTLE INTEREST OR PLEASURE IN DOING THINGS: NOT AT ALL
SUM OF ALL RESPONSES TO PHQ9 QUESTIONS 1 AND 2: 0
SUM OF ALL RESPONSES TO PHQ9 QUESTIONS 1 AND 2: 0
2. FEELING DOWN, DEPRESSED, IRRITABLE, OR HOPELESS: NOT AT ALL
1. LITTLE INTEREST OR PLEASURE IN DOING THINGS: NOT AT ALL
1. LITTLE INTEREST OR PLEASURE IN DOING THINGS: NOT AT ALL
2. FEELING DOWN, DEPRESSED, IRRITABLE, OR HOPELESS: NOT AT ALL
1. LITTLE INTEREST OR PLEASURE IN DOING THINGS: NOT AT ALL
2. FEELING DOWN, DEPRESSED, IRRITABLE, OR HOPELESS: NOT AT ALL
SUM OF ALL RESPONSES TO PHQ9 QUESTIONS 1 AND 2: 0
SUM OF ALL RESPONSES TO PHQ9 QUESTIONS 1 AND 2: 0
2. FEELING DOWN, DEPRESSED, IRRITABLE, OR HOPELESS: NOT AT ALL

## 2023-01-01 ASSESSMENT — LIFESTYLE VARIABLES
TOTAL SCORE: 0
ON A TYPICAL DAY WHEN YOU DRINK ALCOHOL HOW MANY DRINKS DO YOU HAVE: 1
ON A TYPICAL DAY WHEN YOU DRINK ALCOHOL HOW MANY DRINKS DO YOU HAVE: 0
EVER HAD A DRINK FIRST THING IN THE MORNING TO STEADY YOUR NERVES TO GET RID OF A HANGOVER: NO
SUBSTANCE_ABUSE: 0
CONSUMPTION TOTAL: POSITIVE
EVER FELT BAD OR GUILTY ABOUT YOUR DRINKING: NO
HAVE YOU EVER FELT YOU SHOULD CUT DOWN ON YOUR DRINKING: NO
CONSUMPTION TOTAL: NEGATIVE
TOTAL SCORE: 0
AVERAGE NUMBER OF DAYS PER WEEK YOU HAVE A DRINK CONTAINING ALCOHOL: 0
HAVE PEOPLE ANNOYED YOU BY CRITICIZING YOUR DRINKING: NO
AVERAGE NUMBER OF DAYS PER WEEK YOU HAVE A DRINK CONTAINING ALCOHOL: 1
TOTAL SCORE: 0
HAVE PEOPLE ANNOYED YOU BY CRITICIZING YOUR DRINKING: NO
HOW MANY TIMES IN THE PAST YEAR HAVE YOU HAD 5 OR MORE DRINKS IN A DAY: 1
EVER FELT BAD OR GUILTY ABOUT YOUR DRINKING: NO
DOES PATIENT WANT TO STOP DRINKING: NO
DOES PATIENT WANT TO STOP DRINKING: NO
ALCOHOL_USE: NO
TOTAL SCORE: 0
HOW MANY TIMES IN THE PAST YEAR HAVE YOU HAD 5 OR MORE DRINKS IN A DAY: 0
TOTAL SCORE: 0
EVER HAD A DRINK FIRST THING IN THE MORNING TO STEADY YOUR NERVES TO GET RID OF A HANGOVER: NO
HAVE YOU EVER FELT YOU SHOULD CUT DOWN ON YOUR DRINKING: NO
ALCOHOL_USE: NO
TOTAL SCORE: 0

## 2023-01-01 ASSESSMENT — GAIT ASSESSMENTS: GAIT LEVEL OF ASSIST: REFUSED

## 2023-01-01 ASSESSMENT — FIBROSIS 4 INDEX
FIB4 SCORE: 1.27
FIB4 SCORE: 0.69
FIB4 SCORE: 0.35
FIB4 SCORE: 0.96
FIB4 SCORE: 0.55
FIB4 SCORE: 0.96
FIB4 SCORE: 1.05
FIB4 SCORE: 0.92
FIB4 SCORE: 1
FIB4 SCORE: 1.62
FIB4 SCORE: 1.27
FIB4 SCORE: 2.46
FIB4 SCORE: 1.19
FIB4 SCORE: 2.23
FIB4 SCORE: 1.38
FIB4 SCORE: 1.37
FIB4 SCORE: 1.39
FIB4 SCORE: 1

## 2023-01-01 ASSESSMENT — PAIN SCALES - WONG BAKER
WONGBAKER_NUMERICALRESPONSE: DOESN'T HURT AT ALL

## 2023-01-01 ASSESSMENT — ACTIVITIES OF DAILY LIVING (ADL): TOILETING: REQUIRES ASSIST

## 2023-01-03 NOTE — PROGRESS NOTES
Lily arrives to [facility] for a CBC/COD draw. Patient denies acute health concerns. Denies s/s active infections. Labs drawn via venipuncture from L- without difficulty. CBC: Hgb 10.2, Hct 34.2%, platelet count 171k. Patient does NOT meet parameters to receive blood products. 1/5 appt cancelled. Emailed scheduling to request for more. Discharged home to self care in no apparent distress.
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
## 2023-01-18 NOTE — PROGRESS NOTES
Patient arrived to IS for CBC/COD.  Patient denies any acute distress.  Labs drawn from left forearm, patient tolerated well.  Gauze/coban placed.  Confirmed next appointment and patient left clinic in no apparent distress.    Hgb 10.2.  Patient does not meet parameters for transfusion on Thursday.  Patient called and notified that appointment would be cancelled.  Patient verbalized understanding.

## 2023-02-01 NOTE — PROGRESS NOTES
Pt presents to [facility] for labs and possible blood products. Butterfly needle used in L forearm; brisk blood return observed and pt tolerated well. Labs drawn and not within treatable parameters. Pts transfusion appointment cancelled for Thursday and pt verbalized understanding. Next appointment confirmed and education provided. Pt discharged to self care with all personal belongings and in NAD.

## 2023-02-02 NOTE — ED PROVIDER NOTES
"ED Provider Note    CHIEF COMPLAINT  Chief Complaint   Patient presents with   • Chest Pain   • Shortness of Breath       HPI  Lily Nicole is a 24 y.o. female who presents complaining of retrosternal chest pain radiating to the neck and dyspnea for the last 2 days.  She has orthopnea.  She has dyspnea on exertion with walking about 2 minutes.  History of lupus with end-stage renal failure compliant with dialysis Monday Wednesday Friday.  She has some swelling in the feet and may be fluid overloaded.  Her last 2 dialysis sessions fluid was not taken off because she was underweight.  No history of MI although she had an elevated troponin last admission but an unremarkable echocardiogram.  Never had a DVT or PE.  No hemoptysis.  No family history of DVT or PE.  She has had chest pain before from lupus.  Her rheumatologist thinks she is having a lupus flare.  She is on prednisone 10.  Tested negative for COVID last week    REVIEW OF SYSTEMS  Pertinent positives include: Chest pain, dyspnea, orthopnea, dyspnea on exertion.  Pertinent negatives include: Fever, cough, abdominal pain, leg swelling, calf pain.  10+ systems reviewed and negative.      PAST MEDICAL HISTORY  Past Medical History:   Diagnosis Date   • Anemia 01/17/2018   • AVF (arteriovenous fistula) ([facility])     Right Arm   • Dialysis patient ([facility])      dialysis, M,W,F Elizabeth/Joni   • ESRD (end stage renal disease) on dialysis ([facility]) 01/17/2018    Twan Fu   • Heart burn    • Hypertension 01/17/2018    \"Controlled with medication\"   • Indigestion    • Lupus ([facility])    • Migraines 01/17/2018   • Seizure ([facility]) 2013    from high blood pressure, reports 1 time event       FAMILY HISTORY  Family History   Problem Relation Age of Onset   • Diabetes Paternal Grandmother        SOCIAL HISTORY  Social History     Tobacco Use   • Smoking status: Never Smoker   • Smokeless tobacco: Never Used   Substance Use Topics   • Alcohol use: No   • Drug use: No     Social " Lab Facility: 0 "History     Substance and Sexual Activity   Drug Use No       SURGICAL HISTORY  Past Surgical History:   Procedure Laterality Date   • GASTROSCOPY N/A 5/30/2020    Procedure: GASTROSCOPY;  Surgeon: Waylon Mcqueen M.D.;  Location: SURGERY [facility];  Service: Gastroenterology   • GASTROSCOPY-ENDO  12/9/2019    Procedure: GASTROSCOPY;  Surgeon: Aaron Kam M.D.;  Location: SURGERY [facility];  Service: Gastroenterology   • ANGIOPLASTY  01/17/2018    \"Right Arm AV-Fistulagram & Angioplastyx3\"   • ULI BY LAPAROSCOPY  4/5/2010    Performed by SYED MARTELL at SURGERY [facility]   • AV FISTULA CREATION Right    • OTHER      renal biopsy x 3   • OTHER      bone marrow biopsy       CURRENT MEDICATIONS  No current facility-administered medications for this encounter.      Current Outpatient Medications   Medication Sig Dispense Refill   • mycophenolate (MYFORTIC) 180 MG EC tablet Take 1 Tab by mouth every evening. 30 Tab 1   • omeprazole (PRILOSEC) 20 MG delayed-release capsule Take 20 mg by mouth every evening.     • acetaminophen (TYLENOL) 500 MG Tab Take 500 mg by mouth every 6 hours as needed for Moderate Pain.     • atenolol (TENORMIN) 25 MG Tab Take 25 mg by mouth every evening.     • ondansetron (ZOFRAN ODT) 4 MG TABLET DISPERSIBLE Take 1 Tab by mouth every four hours as needed for Nausea (give PO if no IV route available). 10 Tab 0   • amLODIPine (NORVASC) 10 MG Tab Take 10 mg by mouth every evening.     • hydroxychloroquine (PLAQUENIL) 200 MG Tab Take 200 mg by mouth every evening.         ALLERGIES  Allergies   Allergen Reactions   • Clindamycin Rash     Hive   • Keflex Rash     Hives   • Methylprednisolone      Anxious;   • Metoprolol Nausea       PHYSICAL EXAM  VITAL SIGNS: BP (!) 163/114   Pulse 97   Temp 36.8 °C (98.2 °F) (Temporal)   Resp 20   Ht 1.702 m (5' 7\")   Wt 65 kg (143 lb 4.8 oz)   LMP 07/25/2020 (Exact Date)   SpO2 94%   BMI 22.44 kg/m²   Reviewed and " Bill For Surgical Tray: no Billing Type: Third-Party Bill hypertensive, afebrile, no hypoxia on room air mildly tachypneic  Constitutional: Well developed, Well nourished, appearing, speaking full sentences.  HENT: Normocephalic, atraumatic, bilateral external ears normal, oropharynx moist, No exudates or erythema.   Eyes: PERRLA, conjunctiva pink, no scleral icterus.   Cardiovascular: Regular, borderline tachycardic without murmur rub or gallop.  Patent right arm AV fistula.  No dependent edema or calf tenderness.  Respiratory: No rales, rhonchi, wheeze or cough.  Gastrointestinal: Soft, nontender, nondistended.  Skin: No erythema, no rash.   Genitourinary:  No costovertebral angle tenderness.   Neurologic: Alert & oriented x 3, cranial nerves 2-12 intact by passive exam.  No focal deficit noted.  Psychiatric: Affect normal, Judgment normal, Mood normal.     DIFFERENTIAL DIAGNOSIS:  Pulmonary edema, fluid overload, pneumonia, COVID, PE, doubt myocardial infarction.    EKG  EKG Interpretation 7:20 PM    Interpreted by me.  Indication: Chest pain    Rhythm: normal sinus   Rate: Normal at 96  Axis: normal  Ectopy: none  Conduction: normal  ST/T Waves: Nonspecific lateral ST change  Q Waves: none  R Wave progression: normal  Comparison: Unchanged from prior    Clinical Impression: Sinus rhythm with stable nonspecific lateral ST change    RADIOLOGY/PROCEDURES  DX-CHEST-PORTABLE (1 VIEW)   Final Result      1.  Diffuse interstitial and alveolar opacities which may represent interstitial and alveolar edema or pneumonia. Diagnosis includes Covid 19 pneumonia.      2.  No lobar consolidation.      3.  No pleural effusion identified.          LABORATORY:  Results for orders placed or performed during the hospital encounter of 08/02/20   CBC with Differential   Result Value Ref Range    WBC 6.5 4.8 - 10.8 K/uL    RBC 3.69 (L) 4.20 - 5.40 M/uL    Hemoglobin 10.2 (L) 12.0 - 16.0 g/dL    Hematocrit 34.5 (L) 37.0 - 47.0 %    MCV 93.5 81.4 - 97.8 fL    MCH 27.6 27.0 - 33.0 pg    MCHC  29.6 (L) 33.6 - 35.0 g/dL    RDW 64.6 (H) 35.9 - 50.0 fL    Platelet Count 193 164 - 446 K/uL    MPV 9.7 9.0 - 12.9 fL    Neutrophils-Polys 80.20 (H) 44.00 - 72.00 %    Lymphocytes 13.80 (L) 22.00 - 41.00 %    Monocytes 4.90 0.00 - 13.40 %    Eosinophils 0.30 0.00 - 6.90 %    Basophils 0.50 0.00 - 1.80 %    Immature Granulocytes 0.30 0.00 - 0.90 %    Nucleated RBC 0.00 /100 WBC    Neutrophils (Absolute) 5.22 2.00 - 7.15 K/uL    Lymphs (Absolute) 0.90 (L) 1.00 - 4.80 K/uL    Monos (Absolute) 0.32 0.00 - 0.85 K/uL    Eos (Absolute) 0.02 0.00 - 0.51 K/uL    Baso (Absolute) 0.03 0.00 - 0.12 K/uL    Immature Granulocytes (abs) 0.02 0.00 - 0.11 K/uL    NRBC (Absolute) 0.00 K/uL    Anisocytosis 1+     Macrocytosis 1+    Complete Metabolic Panel (CMP)   Result Value Ref Range    Sodium 130 (L) 135 - 145 mmol/L    Potassium 4.2 3.6 - 5.5 mmol/L    Chloride 89 (L) 96 - 112 mmol/L    Co2 28 20 - 33 mmol/L    Anion Gap 13.0 7.0 - 16.0    Glucose 93 65 - 99 mg/dL    Bun 37 (H) 8 - 22 mg/dL    Creatinine 8.26 (HH) 0.50 - 1.40 mg/dL    Calcium 9.1 8.4 - 10.2 mg/dL    AST(SGOT) 27 12 - 45 U/L    ALT(SGPT) 8 2 - 50 U/L    Alkaline Phosphatase 52 30 - 99 U/L    Total Bilirubin 0.6 0.1 - 1.5 mg/dL    Albumin 3.3 3.2 - 4.9 g/dL    Total Protein 8.3 (H) 6.0 - 8.2 g/dL    Globulin 5.0 (H) 1.9 - 3.5 g/dL    A-G Ratio 0.7 g/dL   Troponin   Result Value Ref Range    Troponin T 731 (H) 6 - 19 ng/L   D-DIMER   Result Value Ref Range    D-Dimer Screen 1.63 (H) 0.00 - 0.50 ug/mL (FEU)   proBrain Natriuretic Peptide, NT   Result Value Ref Range    NT-proBNP >72272 (H) 0 - 125 pg/mL   CRP QUANTITIVE (NON-CARDIAC)   Result Value Ref Range    Stat C-Reactive Protein 2.45 (H) 0.00 - 0.75 mg/dL   Sed Rate   Result Value Ref Range    Sed Rate Westergren 55 (H) 0 - 20 mm/hour   COVID/SARS CoV-2 PCR    Specimen: Nasopharyngeal; Respirate   Result Value Ref Range    COVID Order Status Received    SARS-CoV-2, PCR (In-House)   Result Value Ref Range     Expected Date Of Service: 02/02/2023 SARS-CoV-2 Source NP Swab        INTERVENTIONS:  Case discussed with Dr. Wu nephrology who recommended dialysis is scheduled early tomorrow morning.    COURSE & MEDICAL DECISION MAKING  Well-appearing patient with lupus and end-stage renal disease presents with chest pain and dyspnea and has fluid overload pulmonary edema.  She has a recent negative COVID test and does not have symptoms of infection.  Based on the radiology impression however repeat COVID test was sent.  There is no evidence of myocardial infarction on EKG.  She has chronic elevation of troponin due to her end-stage renal disease and her troponin is at baseline.  Never had a PE and does not have symptoms of DVT.  Her d-dimer was ordered because of pleuritic chest pain and is elevated but per chart review she has never had a normal d-dimer.  Given that we have a good explanation for her dyspnea and tachypnea CT imaging unlikely to  and pulmonary embolism is unlikely.  Pneumonia is unlikely.  There is no pneumothorax..    This patient has borderline or elevated blood pressure as recorded above and was instructed to followup with primary physician for comprehensive blood pressure evaluation and yearly fasting cholesterol assessment according to to CMS protocol.    PLAN:  Go for dialysis as scheduled early tomorrow  If pain and symptoms do not resolve after dialysis follow-up with your doctor to discuss whether you need further testing  Return for worsening shortness of breath, leg swelling, changing chest pain, fever or ill appearance    CONDITION: Table.    FINAL IMPRESSION  1. Acute pulmonary edema (HCC)    2. Hypervolemia, unspecified hypervolemia type    3. ESRD (end stage renal disease) (HCC)    4. Other chest pain        Electronically signed by: Renan Perales M.D., 8/2/2020 7:18 PM

## 2023-02-22 NOTE — PROGRESS NOTES
Lily arrives to [facility] for a CBC/COD draw. Patient denies acute health concerns. Labs drawn by QUINN RN after two attempts from two different nurses. CBC: Hgb 10.1, Hct 34%, platelet count 128k. Patient does NOT meet parameters to receive blood products. 2/23 appt cancelled. Discharged home to self care in no apparent distress.

## 2023-03-02 NOTE — TELEPHONE ENCOUNTER
Spoke with pt and she is aware that she must see Dr Garcia to have blood orders renewed. Pt scheduled to see provider 3/7 at 0830, pt verbalized understanding of importance.

## 2023-03-08 NOTE — PROGRESS NOTES
Lily is here for CBC/COD lab draw. Labs drawn as ordered. Hgb = 10.3. Lily does not meet parameters for blood products. Updated Lily regarding lab results. Appointment on Thursday 3/9 cancelled. Next appointment scheduled. Discharged to self care; no apparent distress noted.

## 2023-03-21 PROBLEM — M32.10 SYSTEMIC LUPUS ERYTHEMATOSUS WITH ORGAN SYSTEM INVOLVEMENT (HCC): Status: ACTIVE | Noted: 2023-01-01

## 2023-04-01 NOTE — PROGRESS NOTES
Pt in clinic for methylprednisolone and benadryl, arrived in wheelchair on oxygen, pt needs assist x1.  L upper arm PIV started by ultrasound and will be left in for 3 days.  Flushing well with positive blood return.  Pt tolerated benadryl and solumedrol without complications, piv flushing well and secured.  Pt left clinic with mother

## 2023-04-02 NOTE — PROGRESS NOTES
Pt in clinic for day 2 of 3 methylprednisolone and benadryl for lupus joint pain flare up.  Pt arrived in wheelchair on oxygen, pt needs assist x1.  L upper arm PIV flushing well with positive blood return.  Pt tolerated benadryl and solumedrol without complications, piv flushing well and secured.  Pt left clinic with mother.

## 2023-04-04 NOTE — PROGRESS NOTES
[facility]ily [facility] for day 3 of 3 methylprednisolone and benadryl for lupus joint pain flare up.  Pt arrived in wheelchair on oxygen, pt needs assist x1.  Pt remained in wheelchair during medication administration. L upper arm PIV flushing well but no blood return noted.  Pt tolerated benadryl and solumedrol without complications. PIV discontinued with tip intact. CBC drawn for possible blood products on Thursday. Pt's Hgb = 8.6, Hct 29.6% and platelets 186K.  Pt does not meet parameters for blood products on Thursday. Schedulers emailed about appointment cancellations and future appointments. Pt left clinic with mother.

## 2023-04-08 NOTE — ED NOTES
ERP at bedside.    General: NAD  HEENT: NCAT. Non erythematous, non swollen tonsils. No exudates. Mild L facial droop.   Cardiac: RRR, 2+ radial pulses  Chest: CTA  Abdomen: soft, non-distended, no ttp, no rebound or guarding  Extremities: no peripheral edema, calf tenderness, or leg size discrepancies  Skin: no rashes  Neuro: AAOx3, motor and sensory grossly intact. CN II-XII intact. 5/5 strength upper and lower extremities. Normal sensation bilaterally upper and lower extremities.   Psych: mood and affect appropriate

## 2023-04-19 NOTE — TELEPHONE ENCOUNTER
Pt called and reported that he hgb was low at 7.4, pt meets parameters to move up previously scheduled appt for transfusion. Appt moved and pt updated. Results requested from dialysis to be faxed over.

## 2023-04-20 NOTE — PROGRESS NOTES
CBC results=Hgb 6.6 and platelet count 146K today. Pt meets parameters for blood products. BB communication was sent and pt's next appt 4/22/2023 for blood products.

## 2023-04-20 NOTE — PROGRESS NOTES
Pt arrived ambulatory to [facility] for CBC/COD. POC discussed with pt and she agrees with plan. CBC/COD drawn as ordered, 1 red top and 6 purple top tubes obtained. Pt with appt 4/22/2023 for blood products. BB communication sent to blood bank. Pt discharged to care of family. Pt's next appt confirmed 4/22/2023.

## 2023-04-22 NOTE — PROGRESS NOTES
Patient arrived for 2units PRBC; mother accompanied. Pt complains of fatigue, no s/s or worsening conditions.  Pt does request as minimal fluid as possible, currently on 6L NC.  Ultrasound chairside, PIV placed.  Pre-med given, 2 units PRBC infused without incident.  PIV removed, gauze dressing applied. Pt returns in two weeks; discharged home in no acute distress.

## 2023-05-23 NOTE — ED PROVIDER NOTES
ED Provider Note    CHIEF COMPLAINT  Chief Complaint   Patient presents with   • Joint Pain       HPI  Lily Nicole is a 25 y.o. female who presents to the Emergency Department with a history of lupus, she does have lupus nephritis as well as end-stage renal disease, she is on chronic prednisone therapy currently at 5 mg, has had recurrent GI bleeding.  The patient comes to the ER today stating that with the weather change and coldness she is feeling stiffness and pain in her joints, they are not swollen, she has had no fever, she feels like she is having a lupus flare.  She tells me she sees Dr. Wong for her lupus.    REVIEW OF SYSTEMS  As above, all other systems negative.  PAST MEDICAL HISTORY   has a past medical history of Anemia (01/17/2018), AVF (arteriovenous fistula) ([facility]), Chest pain, Chest tightness, Daytime sleepiness, Dialysis patient ([facility]), Difficulty breathing, ESRD (end stage renal disease) on dialysis ([facility]) (01/17/2018), Gasping for breath, Heart burn, Hypertension (01/17/2018), Indigestion, Lupus ([facility]), Migraines (01/17/2018), Painful breathing, Palpitations, Seizure ([facility]) (2013), Shortness of breath, Swelling of lower extremity, and Wheezing.    SOCIAL HISTORY  Social History     Tobacco Use   • Smoking status: Never Smoker   • Smokeless tobacco: Never Used   Vaping Use   • Vaping Use: Never used   Substance and Sexual Activity   • Alcohol use: No   • Drug use: No   • Sexual activity: Not on file       SURGICAL HISTORY   has a past surgical history that includes ronak by laparoscopy (4/5/2010); av fistula creation (Right); angioplasty (01/17/2018); other; other; gastroscopy-endo (12/9/2019); gastroscopy (N/A, 5/30/2020); gastroscopy-endo (9/18/2020); upper gi endoscopy,ctrl bleed (11/12/2020); upper gi endoscopy,biopsy (11/12/2020); gastroscopy-endo (11/12/2020); colonoscopy,diagnostic (1/8/2021); upper gi endoscopy,diagnosis (1/8/2021); upper gi endoscopy,ctrl bleed (1/8/2021); upper gi  endoscopy,diagnosis (3/5/2021); upper gi endoscopy,diagnosis (N/A, 3/19/2021); upper gi endoscopy,ctrl bleed (3/19/2021); and bronchoscopy,diagnostic (Bilateral, 5/13/2021).    CURRENT MEDICATIONS  Reviewed.  See Encounter Summary.  Include   Home Medications    Medication Sig Taking? Last Dose Authorizing Provider   traMADol (ULTRAM) 50 MG Tab tramadol 50 mg tablet   TAKE 1 TABLET BY MOUTH EVERY 6 HOURS AS NEEDED GOT MODERATE PAIN FOR 14 DAYS   Physician Outpatient   polyethylene glycol-electrolytes (NULYTELY) 420 GM solution peg-electrolyte solution 420 gram oral solution   FOLLOW GI CONSULTANTS HANDOUT   Physician Outpatient   oxyCODONE-acetaminophen (PERCOCET) 5-325 MG Tab oxycodone-acetaminophen 5 mg-325 mg tablet   TAKE 1 TABLET BY MOUTH EVERY 4 HOURS AS NEEDED FOR UP TO 3 DAYS   Physician Outpatient   oxyCODONE immediate-release (ROXICODONE) 5 MG Tab oxycodone 5 mg tablet   TAKE 1 TABLET BY MOUTH EVERY 6 HOURS AS NEEDED FOR 5 DAYS   Physician Outpatient   omeprazole (PRILOSEC) 40 MG delayed-release capsule omeprazole 40 mg capsule,delayed release   TAKE 1 CAPSULE BY MOUTH ONCE DAILY FOR 18 DAYS   Physician Outpatient   metoclopramide (REGLAN) 10 MG Tab metoclopramide 10 mg tablet   TAKE 1 TABLET BY MOUTH THREE TIMES DAILY BEFORE MEAL(S) FOR 25 DAYS   Physician Outpatient   ferrous gluconate (FERGON) 324 (38 Fe) MG Tab ferrous gluconate 324 mg (38 mg iron) tablet   TAKE 1 TABLET BY MOUTH THREE TIMES DAILY WITH MEALS   Physician Outpatient   famotidine (PEPCID) 20 MG Tab famotidine 20 mg tablet   Physician Outpatient   dapsone 100 MG Tab dapsone 100 mg tablet   TAKE 1 TABLET BY MOUTH ONCE DAILY FOR 30 DAYS   Physician Outpatient   atenolol (TENORMIN) 25 MG Tab atenolol 25 mg tablet   TAKE 1 TABLET BY MOUTH ONCE DAILY   Physician Outpatient   budesonide-formoterol (SYMBICORT) 160-4.5 MCG/ACT Aerosol Symbicort 160 mcg-4.5 mcg/actuation HFA aerosol inhaler   Physician Outpatient   PREDNISONE PO Take 5 mg by mouth  "every day.   Physician Outpatient   hydroxychloroquine (PLAQUENIL) 200 MG Tab Take 200 mg by mouth every day.   Physician Outpatient   ondansetron (ZOFRAN ODT) 4 MG TABLET DISPERSIBLE Take 1 tablet by mouth every four hours as needed for Nausea.   Tamra Zapata M.D.   pantoprazole (PROTONIX) 40 MG Tablet Delayed Response Take 1 tablet by mouth 2 times a day.   Sidney Schreiber M.D.   furosemide (LASIX) 80 MG Tab Take 3 Tablets by mouth 2 times a day.   Jorge Melendez M.D.   cloNIDine (CATAPRES) 0.2 MG/24HR PATCH WEEKLY patch Place 1 Patch on the skin every 7 days.   Physician Outpatient   predniSONE (DELTASONE) 5 MG Tab Take 5 mg by mouth every day.   Physician Outpatient   carvedilol (COREG) 25 MG Tab Take 1 tablet by mouth 2 times a day with meals.   ROGE Cervantes.O.   losartan (COZAAR) 25 MG Tab Take 1 tablet by mouth every day.   Sukumar Mederos-Puma D.O.   amLODIPine (NORVASC) 10 MG Tab Take 1 tablet by mouth every evening.   Melinda Trent M.D.   mycophenolate (MYFORTIC) 180 MG EC tablet Take 1 Tab by mouth every evening.   Demetra Dior D.O.   hydroxychloroquine (PLAQUENIL) 200 MG Tab Take 200 mg by mouth every evening.   Physician Outpatient       ALLERGIES  Allergies   Allergen Reactions   • Cephalexin Rash     .   • Clindamycin Rash     .   • Methylprednisolone Unspecified     Anxious   • Metoprolol Rash     .   • Maxipime [Cefepime] Itching   • Norco [Hydrocodone-Acetaminophen] Rash and Nausea     Generalized rash   • Tape        PHYSICAL EXAM  VITAL SIGNS: /67   Pulse 72   Temp 36.3 °C (97.3 °F) (Temporal)   Resp 16   Ht 1.702 m (5' 7\")   Wt 54 kg (119 lb 0.8 oz)   LMP 09/20/2021 (Approximate)   BMI 18.65 kg/m²   Constitutional:  Alert in no apparent distress.  HENT: Normocephalic, Bilateral external ears normal. Nose normal.   Eyes: Pupils are equal and reactive. Conjunctiva normal, non-icteric.   Thorax & Lungs: Easy unlabored respirations  Abdomen:  No gross signs of " peritonitis, no pain with movement   Skin: Visualized skin is  Dry, No erythema, No rash.   Extremities:   No edema, No asymmetry, No joint swelling  Neurologic: Alert, Grossly non-focal.   Psychiatric: Affect and Mood normal      COURSE & MEDICAL DECISION MAKING  Nursing notes and vital signs were reviewed. (See chart for details)    The patient presents to the Emergency Department with increased joint pain.  There is no signs of swollen joints or infected joints at this time.  She is complaining that Tylenol is not enough for her pain.  I am going to call her rheumatologist, Dr. Wong for further instruction on how to manage this as he is her primary provider for her lupus.  Reviewing her  shows prior narcotic prescriptions as below  07/06/2021  1   07/06/2021  Oxycodone-Acetaminophen 5-325  12.00 3 Ma Shiva  [id number]  [place] ([id number])  0 30.00 MME Comm Ins  [place]   03/06/2021  1   03/06/2021  Tramadol Hcl 50 Mg Tablet  28.00 14 Ro Carly  [id number]  [place] ([id number])  0 10.00 [place]   02/18/2021  1   02/18/2021  Oxycodone Hcl 5 Mg Tablet                  I spoke with Dr. Wong about her care, he would like her to increase her prednisone to 20 mg I given her the first dose here.  He will call her later for instruction.  When I told her about the plan she became tearful and crying stating she is in too much pain at this time.  I will give her a 50 mcg injection of fentanyl to help subside her pain while the prednisone is setting in.  She is not agreeable to any oral form of pain medication.  She will have follow-up with her rheumatologist this afternoon.        The patient was discharged home with an information sheet on joint pain and told to return immediately for any signs or symptoms listed, or any unexpected symptoms.  The patient verbally agreed to the discharge precautions and follow-up plan which is documented in EPIC.  DISPOSITION:    Patient will be discharged home in stable condition.    FOLLOW UP:  Abdoulaye CHILEL  TERRI Wong  [address] [place] [address] [id number]  [place] [address]  [phone]            FINAL IMPRESSION   1. Arthralgia, unspecified joint             None

## 2023-06-09 NOTE — ED PROVIDER NOTES
ED Provider Note    CHIEF COMPLAINT  Chief Complaint   Patient presents with    Bleeding/Bruising     Patient BIB EMS. Patient states she experienced sudden onset of bleeding from her fistula sight on her right arm starting about 1500. Patient states she had a dialysis treatment at 6 am this morning. Patient moved from [facility] to hospital bed. Patient hooked up to monitors. Right upper arm wrapped with coban for compression.        EXTERNAL RECORDS REVIEWED  Based on labs and previous work-up for comparison.    HPI/ROS  LIMITATION TO HISTORY   Select: : None  OUTSIDE HISTORIAN(S):  None    Lily JOJO Nicole is a 27 y.o. female who presents to the emergency department complaining of bleeding from her dialysis site.  Patient had dialysis today and she has been having bleeding since the site.  Is wrapped up.  She does not feel like she lost a lot of blood.  She feels anxious and itchy was requesting Benadryl.  She states she had this before.  Otherwise she states she feels at baseline.  No fevers or chills no chest pain cough shortness of breath or other complaints.    PAST MEDICAL HISTORY   has a past medical history of Anemia (01/17/2018), AVF (arteriovenous fistula) ([facility]), Chest pain, Chest tightness, Daytime sleepiness, Dialysis patient ([facility]), Difficulty breathing, ESRD (end stage renal disease) on dialysis ([facility]) (01/17/2018), Gasping for breath, Heart burn, Hypertension (01/17/2018), Indigestion, Lupus ([facility]), Migraines (01/17/2018), Painful breathing, Palpitations, Seizure ([facility]) (2013), Shortness of breath, Swelling of lower extremity, and Wheezing.    SURGICAL HISTORY   has a past surgical history that includes ronak by laparoscopy (4/5/2010); av fistula creation (Right); angioplasty (01/17/2018); other; other; gastroscopy-endo (12/9/2019); gastroscopy (N/A, 5/30/2020); gastroscopy-endo (9/18/2020); upper gi endoscopy,ctrl bleed (11/12/2020); upper gi endoscopy,biopsy (11/12/2020); gastroscopy-endo (11/12/2020);  colonoscopy,diagnostic (1/8/2021); upper gi endoscopy,diagnosis (1/8/2021); upper gi endoscopy,ctrl bleed (1/8/2021); upper gi endoscopy,diagnosis (3/5/2021); upper gi endoscopy,diagnosis (N/A, 3/19/2021); upper gi endoscopy,ctrl bleed (3/19/2021); bronchoscopy,diagnostic (Bilateral, 5/13/2021); upper gi endoscopy,diagnosis (N/A, 8/26/2022); and upper gi endoscopy,ctrl bleed (N/A, 8/26/2022).    FAMILY HISTORY  Family History   Problem Relation Age of Onset    Diabetes Paternal Grandmother        SOCIAL HISTORY  Social History     Tobacco Use    Smoking status: Never    Smokeless tobacco: Never   Vaping Use    Vaping Use: Never used   Substance and Sexual Activity    Alcohol use: No    Drug use: No    Sexual activity: Not on file       CURRENT MEDICATIONS  Home Medications    **Home medications have not yet been reviewed for this encounter**         ALLERGIES  Allergies   Allergen Reactions    Cephalexin Rash     Nausea and rash   Hives  .    Clindamycin Rash     Nausea and rash     Hive  .    Hydrocodone Rash and Nausea     Rash      Maxipime [Cefepime] Itching    Methylprednisolone Unspecified     Anxious      Tolerates prednisone     Metoprolol Rash and Nausea     Nausea and rash     Tolerates antenalol    Diagnostic X-Ray Materials Itching     Per patient's mother    Furosemide      Other reaction(s): Unknown-Explain in Comments  Unable to obtain at this time    Hydroxyzine Hcl Rash    Insulin      Other reaction(s): Other-Reaction in Comments  Patient is very sensitive to insulin administration, especially when treating hyperkalemia.  Has a hx of blood glucose 12.  Closely monitor for severe hypoglycemia that could precipitate seizures. If it's required, then give less insulin/more dextrose to prevent such hypoglycemic episodes.    9/25/2022   Verified by Dr. Kelsie Emerson (endocrinoligy)    Compazine Anxiety    Fentanyl And Related Anxiety    Metoclopramide Anxiety    Tape Rash     Paper tape is ok  "      PHYSICAL EXAM  VITAL SIGNS: BP (!) 159/104   Pulse 95   Temp 37.1 °C (98.8 °F) (Temporal)   Resp 20   Ht 1.702 m (5' 7\")   Wt 40.5 kg (89 lb 4.6 oz)   SpO2 100%   BMI 13.98 kg/m²    Constitutional: Well developed, Well nourished, No acute distress, Non-toxic appearance.   HENT: Normocephalic, Atraumatic  Eyes: PERRL, EOMI, Conjunctiva normal, No discharge.   Neck: Normal range of motion  Cardiovascular: Normal heart rate, Normal rhythm, No murmurs, No rubs, No gallops.   Thorax & Lungs: Normal breath sounds, No respiratory distress,  Abdomen: Bowel sounds normal, Soft, No tenderness  Skin: Warm, Dry, No erythema, No rash.   Back: No tenderness, No CVA tenderness.   Musculoskeletal: Good range of motion in all major joints.   Neurologic: Alert,No focal deficits noted.   Psychiatric: Affect normal      DIAGNOSTIC STUDIES / PROCEDURES  Results for orders placed or performed during the hospital encounter of 06/09/23   CBC WITH DIFFERENTIAL   Result Value Ref Range    WBC 2.8 (L) 4.8 - 10.8 K/uL    RBC 3.65 (L) 4.20 - 5.40 M/uL    Hemoglobin 8.0 (L) 12.0 - 16.0 g/dL    Hematocrit 27.6 (L) 37.0 - 47.0 %    MCV 75.6 (L) 81.4 - 97.8 fL    MCH 21.9 (L) 27.0 - 33.0 pg    MCHC 29.0 (L) 32.2 - 35.5 g/dL    RDW 58.6 (H) 35.9 - 50.0 fL    Platelet Count 127 (L) 164 - 446 K/uL    Neutrophils-Polys 81.00 (H) 44.00 - 72.00 %    Lymphocytes 13.00 (L) 22.00 - 41.00 %    Monocytes 3.00 0.00 - 13.40 %    Eosinophils 1.00 0.00 - 6.90 %    Basophils 2.00 (H) 0.00 - 1.80 %    Nucleated RBC 0.00 0.00 - 0.20 /100 WBC    Neutrophils (Absolute) 2.27 1.82 - 7.42 K/uL    Lymphs (Absolute) 0.36 (L) 1.00 - 4.80 K/uL    Monos (Absolute) 0.08 0.00 - 0.85 K/uL    Eos (Absolute) 0.03 0.00 - 0.51 K/uL    Baso (Absolute) 0.06 0.00 - 0.12 K/uL    NRBC (Absolute) 0.00 K/uL    Hypochromia 2+ (A)     Anisocytosis 1+     Macrocytosis 1+    BASIC METABOLIC PANEL   Result Value Ref Range    Sodium 131 (L) 135 - 145 mmol/L    Potassium 4.3 3.6 - " 5.5 mmol/L    Chloride 90 (L) 96 - 112 mmol/L    Co2 34 (H) 20 - 33 mmol/L    Glucose 81 65 - 99 mg/dL    Bun 12 8 - 22 mg/dL    Creatinine 1.83 (H) 0.50 - 1.40 mg/dL    Calcium 8.3 (L) 8.5 - 10.5 mg/dL    Anion Gap 7.0 7.0 - 16.0   ESTIMATED GFR   Result Value Ref Range    GFR (CKD-EPI) 39 (A) >60 mL/min/1.73 m 2   DIFFERENTIAL MANUAL   Result Value Ref Range    Manual Diff Status PERFORMED    PERIPHERAL SMEAR REVIEW   Result Value Ref Range    Peripheral Smear Review see below    PLATELET ESTIMATE   Result Value Ref Range    Plt Estimation Decreased    MORPHOLOGY   Result Value Ref Range    RBC Morphology Present     Poikilocytosis 1+     Target Cells 1+         RADIOLOGY      COURSE & MEDICAL DECISION MAKING    ED Observation Status? Yes; I am placing the patient in to an observation status due to a diagnostic uncertainty as well as therapeutic intensity. Patient placed in observation status at 4:48 PM, 6/9/2023.     Observation plan is as follows: We will put the patient in observation we will control her bleeding and check her baseline labs.    Upon Reevaluation, the patient's condition has: Improved; and will be discharged.    Patient discharged from ED Observation status at 1823 (Time) 6/9/23 (Date).     INITIAL ASSESSMENT, COURSE AND PLAN  Care Narrative:     Patient presents with bleeding from her dialysis access of right upper extremity.  She has a dressing in place which is keeping it at bay.  Once I have the appropriate mental staff was able to address this.    The dressing was removed shows arterial bleeding was continuous nonpulsatile from her graft site.  Pressure was held with a Q-tip.  Was then injected with lidocaine without epinephrine oversewn with a figure-of-eight with a 4-0 nylon suture.  Bleeding was controlled with a suture.  Then a dressing was applied.  This is one 4 x 4 with a short small Kerlix wrap.      The patient was observed here in the ED for about an hour after this procedure.   There is no bleeding through the dressing and no continued bleeding and the patient has reassuring hemodynamic.    Norberto labs are reassuring hemoglobin is better than baseline.        ADDITIONAL PROBLEM LIST  End-stage renal disease  Chronic hemodialysis.    DISPOSITION AND DISCUSSIONS      Escalation of care considered, and ultimately not performed:acute inpatient care management, however at this time, the patient is most appropriate for outpatient management    Patient will follow-up with her primary care doctor.    Ella Matthew M.D.  [address] [facility] [address]  [phone]              FINAL DIAGNOSIS  1. Bleeding    2. Problem with dialysis access, initial encounter (HCC)    3. ESRD (end stage renal disease) (HCC)           Electronically signed by: Gadiel Wadsworth M.D., 6/9/2023 4:48 PM

## 2023-06-09 NOTE — ED TRIAGE NOTES
"Chief Complaint   Patient presents with    Bleeding/Bruising     Patient BIB EMS. Patient states she experienced sudden onset of bleeding from her fistula sight on her right arm starting about 1500. Patient states she had a dialysis treatment at 6 am this morning. Patient moved from [facility] to hospital bed. Patient hooked up to monitors. Right upper arm wrapped with coban for compression.         BIB EMS for above complaint. Per EMS vitals 130/70, HR 98, SpO2 100, RR 18 , GCS 15    Patient set up to monitors. Patient states she is usually on 5L at home. Patient states she takes 4mg of Morphine every 8h at home for pain.     EMS gave:  100 Fentanyl  4 Zofran  4 Morphine    Ht 1.702 m (5' 7\")   Wt 40.5 kg (89 lb 4.6 oz)   BMI 13.98 kg/m²     "

## 2023-06-10 NOTE — ED NOTES
Pt discharged, all appropriate hospital equipment removed (IV, monitor, pulse ox, etc.). Pt left unit via wc with parent to vehicle for home. Personal belongings with pt when leaving unit. Pt given discharge instructions prior to leaving unit including where to  prescriptions and when to follow-up; verbalizes understanding. Pt informed to return to ED if symptoms worsen/return or altered status develop. Copy of discharge instructions signed and turned into DC basket and copy sent with pt.

## 2023-06-10 NOTE — DISCHARGE INSTRUCTIONS
Keep wound clean and dry.  Keep your dialysis access dressed.  Follow-up with dialysis as planned.  Return for any new medical problems or complaints.

## 2023-06-14 PROBLEM — E87.3 METABOLIC ALKALOSIS: Status: ACTIVE | Noted: 2023-01-01

## 2023-06-14 PROBLEM — E87.8 DISORDER OF ELECTROLYTES: Status: ACTIVE | Noted: 2023-01-01

## 2023-06-14 PROBLEM — I27.20 PULMONARY HYPERTENSION (HCC): Status: ACTIVE | Noted: 2023-01-01

## 2023-06-14 NOTE — ED NOTES
Plan for patient to go to dialysis from ED for transfusion and dialysis, consent signed and in the chart. Plan for floor RN to come down and place tele box on patient while in dialysis.

## 2023-06-14 NOTE — ASSESSMENT & PLAN NOTE
Controlled. Starting GDMT slowly for HF. Discussed metoprolol allergy, mom states she does ot recall any reactions to it.   - Start ARB  - Start metoprolol succinate   - cont home clonidine patch, potentially can dc at home   - hydralazine prn

## 2023-06-14 NOTE — ED NOTES
Report called to Selina MARTINEZ. Will come  patient to transport to floor.   Not starting blood at this time due to needing dialysis while receiving blood transfusion.

## 2023-06-14 NOTE — ED TRIAGE NOTES
Chief Complaint   Patient presents with    Bleeding/Bruising     Pt BIB [facility] from [facility]. Pt was having dialysis today, after removing needle from site, bleeding continued for approx 45 min. States this happened Friday as well and required a few stitches. Per EMS, bleeding was controlled by the time they arrived, no active bleeding noted at this time.      Pt BIB [facility] for above.     VSS on arrival. Chart up for ERP.

## 2023-06-14 NOTE — ASSESSMENT & PLAN NOTE
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 Patient is cachectic, has low prealbumin and albumin, low marcelina and Zn. Started on tube feeding (ND tube) and tolerating well.   - Dobhoff placed 6/19, couldn't tolerate overnight and removed. Repeated NG tube placement, postpyloric, successful 6/26  - Very hard to encourage PO despite family attempts   - Nutrition on consult  - Vitamins, multivitamin po  - Boost with meals  - GI consulted, on marinol   - Psychiatry consulted recs made for scheduled ativan and benadryl, remeron switch to lexapro 20 mg, zyprexa continue at current dose.  - Psychology on consult   - Team discussed hospice if no nutrition achieved, patient and mom opted for new ND tube placed 6/26 successfully postpyloric, currently almost at goal   - Monitoring weight   -  orders placed for ND tube nutrition at home, dietitian to evaluate at home

## 2023-06-14 NOTE — ASSESSMENT & PLAN NOTE
Per  [facility] files possible component of connective tissue disease as well. No [facility] labs as there is low suspicion of exacerbation as of now. 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   - Continue home Plaquenil, Mycophenolate, titrating down steroids

## 2023-06-14 NOTE — CONSULTS
"Nephrology Consultation    Date of Service  6/14/2023    Referring Physician  Selam Stevenson M.D.    Consulting Physician  Navin Metz M.D.    Reason for Consultation  Management of ESRD on HD      History of Presenting Illness  27 y.o. female with a history of uncontrolled lupus, hypertension, ESRD on hemodialysis Monday Wednesday Friday who presented 6/14/2023 with prolonged bleeding from AV fistula.    The patient recently had AV fistulogram on 5/30/2023 with [facility].  The fistula was widely patent with no problems with her central vein stents noted.  The patient did have a suture in her arm.  She says that the tech today placed her venous needle near that suture, and was worried that she \"reopened the hole.\"  On my evaluation, the bleeding has already stopped.  The patient does complain of worsening shortness of breath.  Hemoglobin noted to be 6.1.  The patient denies any blood loss.  She is on high-dose Epogen 13,000 units with each dialysis treatment, recently increased from 8000 units earlier in June 2023.  The patient was previously listed for kidney transplant, but currently off the list due to chronic oxygen dependence.    The patient has been losing weight consistently since March 2023, when her previous dialysis target weight was 43 kg.  In May 2023, her target weight was lowered to 41 kg due to ongoing weight loss, and the patient is now leaving dialysis less than 40 kg.  The patient says she has had nasoenteric tube feeding in the past, and she did not tolerate it and would not want it.  She also refuses to be considered for G-tube or J-tube placement for further enteral nutrition.  The patient is receiving intradialytic parenteral nutrition, which she claims was recently increased, and she is hoping this is enough to help her gain weight again.  The patient requests Benadryl and dialysis with blood transfusion so that she does not become short of breath.    Review of " "Systems  Review of Systems   Constitutional:  Negative for fever.   Respiratory:  Positive for shortness of breath.    Cardiovascular:  Negative for chest pain.   Gastrointestinal:  Negative for abdominal pain.   All other systems reviewed and are negative.      Past Medical History  Past Medical History:   Diagnosis Date    Anemia 01/17/2018    AVF (arteriovenous fistula) ([facility])     Right Arm    Chest pain     Chest tightness     Daytime sleepiness     Dialysis patient ([facility])      dialysis, M,W,F [facility]    Difficulty breathing     ESRD (end stage renal disease) on dialysis ([facility]) 01/17/2018    Twan Fu    Gasping for breath     Heart burn     Hypertension 01/17/2018    \"Controlled with medication\"    Indigestion     Lupus ([facility])     Migraines 01/17/2018    Painful breathing     Palpitations     Seizure ([facility]) 2013    from high blood pressure, reports 1 time event    Shortness of breath     Swelling of lower extremity     Wheezing        Surgical History  Past Surgical History:   Procedure Laterality Date    GA UPPER GI ENDOSCOPY,DIAGNOSIS N/A 8/26/2022    Procedure: ESOPHAGOGASTRODUODENOSCOPY;  Surgeon: Parveen Wood M.D.;  Location: [facility];  Service: Gastroenterology    GA UPPER GI ENDOSCOPY,CTRL BLEED N/A 8/26/2022    Procedure: GASTROSCOPY, WITH ARGON PLASMA COAGULATION;  Surgeon: Parveen Wood M.D.;  Location: [facility];  Service: Gastroenterology    GA BRONCHOSCOPY,DIAGNOSTIC Bilateral 5/13/2021    Procedure: BRONCHOSCOPY, BRONCHOALVEOLAR LAVAGE;  Surgeon: William Spangler M.D.;  Location: [facility];  Service: Pulmonary    GA UPPER GI ENDOSCOPY,DIAGNOSIS N/A 3/19/2021    Procedure: GASTROSCOPY;  Surgeon: Waylon Mcqueen M.D.;  Location: [facility];  Service: Gastroenterology    GA UPPER GI ENDOSCOPY,CTRL BLEED  3/19/2021    Procedure: GASTROSCOPY, WITH ARGON PLASMA COAGULATION;  Surgeon: Waylon Mcqueen M.D.;  Location: [facility];  " "Service: Gastroenterology    ND UPPER GI ENDOSCOPY,DIAGNOSIS  3/5/2021    Procedure: GASTROSCOPY - W/HEMOSTASIS;  Surgeon: Ej Silva M.D.;  Location: [facility];  Service: Gastroenterology    ND COLONOSCOPY,DIAGNOSTIC  1/8/2021    Procedure: COLONOSCOPY;  Surgeon: Herbert Contreras M.D.;  Location: [facility];  Service: Gastroenterology    ND UPPER GI ENDOSCOPY,DIAGNOSIS  1/8/2021    Procedure: GASTROSCOPY;  Surgeon: Herbert Contreras M.D.;  Location: [facility];  Service: Gastroenterology    ND UPPER GI ENDOSCOPY,CTRL BLEED  1/8/2021    Procedure: GASTROSCOPY, WITH ARGON PLASMA COAGULATION;  Surgeon: Herbert Contreras M.D.;  Location: [facility];  Service: Gastroenterology    ND UPPER GI ENDOSCOPY,CTRL BLEED  11/12/2020    Procedure: GASTROSCOPY, WITH ARGON PLASMA COAGULATION;  Surgeon: Gadiel Whitney M.D.;  Location: [facility];  Service: Gastroenterology    ND UPPER GI ENDOSCOPY,BIOPSY  11/12/2020    Procedure: GASTROSCOPY, WITH BIOPSY;  Surgeon: Gadiel Whitney M.D.;  Location: [facility];  Service: Gastroenterology    GASTROSCOPY-ENDO  11/12/2020    Procedure: GASTROSCOPY;  Surgeon: Gadiel Whitney M.D.;  Location: [facility];  Service: Gastroenterology    GASTROSCOPY-ENDO  9/18/2020    Procedure: GASTROSCOPY;  Surgeon: Gadiel Whitney M.D.;  Location: [facility];  Service: Gastroenterology    GASTROSCOPY N/A 5/30/2020    Procedure: GASTROSCOPY;  Surgeon: Waylon Mcqueen M.D.;  Location: [facility];  Service: Gastroenterology    GASTROSCOPY-ENDO  12/9/2019    Procedure: GASTROSCOPY;  Surgeon: Aaron Kam M.D.;  Location: [facility];  Service: Gastroenterology    ANGIOPLASTY  01/17/2018    \"Right Arm AV-Fistulagram & Angioplastyx3\"    ULI BY LAPAROSCOPY  4/5/2010    Performed by SYED MARTELL at [facility]    AV FISTULA CREATION Right     OTHER      renal biopsy x 3    OTHER      " bone marrow biopsy       Family History  Family History   Problem Relation Age of Onset    Diabetes Paternal Grandmother        Social History  Social History     Socioeconomic History    Marital status: Single     Spouse name: Not on file    Number of children: Not on file    Years of education: Not on file    Highest education level: Not on file   Occupational History    Not on file   Tobacco Use    Smoking status: Never    Smokeless tobacco: Never   Vaping Use    Vaping Use: Never used   Substance and Sexual Activity    Alcohol use: No    Drug use: No    Sexual activity: Not on file   Other Topics Concern    Not on file   Social History Narrative    Not on file     Social Determinants of Health     Financial Resource Strain: Low Risk  (3/8/2021)    Overall Financial Resource Strain (CARDIA)     Difficulty of Paying Living Expenses: Not hard at all   Food Insecurity: No Food Insecurity (3/8/2021)    Hunger Vital Sign     Worried About Running Out of Food in the Last Year: Never true     Ran Out of Food in the Last Year: Never true   Transportation Needs: No Transportation Needs (3/8/2021)    PRAPARE - Transportation     Lack of Transportation (Medical): No     Lack of Transportation (Non-Medical): No   Physical Activity: Not on file   Stress: Not on file   Social Connections: Not on file   Intimate Partner Violence: Not on file   Housing Stability: Not on file       Medications  Current Facility-Administered Medications   Medication Dose Route Frequency Provider Last Rate Last Admin    senna-docusate (PERICOLACE or SENOKOT S) 8.6-50 MG per tablet 2 Tablet  2 Tablet Oral BID Raul Anthony M.D.        And    polyethylene glycol/lytes (MIRALAX) PACKET 1 Packet  1 Packet Oral QDAY PRN Raul Anthony M.D.        And    magnesium hydroxide (MILK OF MAGNESIA) suspension 30 mL  30 mL Oral QDAY PRN Raul Anthony M.D.        And    bisacodyl (DULCOLAX) suppository 10 mg  10 mg Rectal QDAY PRN Raul  TERRI Anthony        acetaminophen (Tylenol) tablet 650 mg  650 mg Oral Q6HRS PRN Raul Anthony M.D.        ondansetron (ZOFRAN) syringe/vial injection 4 mg  4 mg Intravenous Q4HRS PRN Raul Anthony M.D.        ondansetron (ZOFRAN ODT) dispertab 4 mg  4 mg Oral Q4HRS PRN Raul Anthony M.D.        diphenhydrAMINE (BENADRYL) injection 50 mg  50 mg Intravenous Once Raul Anthony M.D.        [Held by provider] hydroxychloroquine (Plaquenil) tablet 200 mg  200 mg Oral QAM Raul Anthony M.D.        [Held by provider] predniSONE (DELTASONE) tablet 5 mg  5 mg Oral DAILY Raul Anthony M.D.        [Held by provider] mycophenolate sodium (MYFORTIC) tablet 360 mg  360 mg Oral QAM Raul Anthony M.D.        [Held by provider] pantoprazole (PROTONIX) EC tablet 40 mg  40 mg Oral BID Raul Anthony M.D.        [Held by provider] HYDROmorphone (DILAUDID) tablet 2 mg  2 mg Oral Q8HRS PRN Raul Anthony M.D.        NS infusion   Intravenous Continuous Raul Anthony M.D.        DIPHENHYDRAMINE HCL 50 MG/ML INJ SOLN              Current Outpatient Medications   Medication Sig Dispense Refill    cloNIDine (CATAPRES) 0.2 MG/24HR PATCH WEEKLY patch APPLY 1 PATCH TOPICALLY ONCE A WEEK (Patient taking differently: Place 1 Patch on the skin every 7 days.) 4 Patch 0    epoetin roque (EPOGEN/PROCRIT) 35972 UNIT/ML Solution Inject  under the skin.      pantoprazole (PROTONIX) 40 MG Tablet Delayed Response Take 40 mg by mouth 2 times a day.      HYDROmorphone (DILAUDID) 2 MG Tab Take 2 mg by mouth every 8 hours as needed for Severe Pain.      mycophenolate sodium (MYFORTIC) 360 MG Tablet Delayed Response tablet Take 360 mg by mouth every morning.      predniSONE (DELTASONE) 5 MG Tab Take 5 mg by mouth every day.      hydroxychloroquine (PLAQUENIL) 200 MG Tab Take 200 mg by mouth every morning.         Allergies  Allergies   Allergen Reactions    Cephalexin Rash     Nausea and rash   Hives  .     Clindamycin Rash     Nausea and rash     Hive  .    Hydrocodone Rash and Nausea     Rash      Maxipime [Cefepime] Itching    Methylprednisolone Unspecified     Anxious      Tolerates prednisone     Metoprolol Rash and Nausea     Nausea and rash     Tolerates antenalol    Diagnostic X-Ray Materials Itching     Per patient's mother    Furosemide      Other reaction(s): Unknown-Explain in Comments  Unable to obtain at this time    Hydroxyzine Hcl Rash    Insulin      Other reaction(s): Other-Reaction in Comments  Patient is very sensitive to insulin administration, especially when treating hyperkalemia.  Has a hx of blood glucose 12.  Closely monitor for severe hypoglycemia that could precipitate seizures. If it's required, then give less insulin/more dextrose to prevent such hypoglycemic episodes.    9/25/2022   Verified by Dr. Kelsie Emerson (endocrinoligy)    Compazine Anxiety    Fentanyl And Related Anxiety    Metoclopramide Anxiety    Tape Rash     Paper tape is ok       Physical Exam  Temp:  [36.9 °C (98.4 °F)] 36.9 °C (98.4 °F)  Pulse:  [80-97] 86  Resp:  [12-20] 16  BP: ()/(72-99) 129/91  SpO2:  [98 %-100 %] 100 %    Physical Exam  Constitutional:       General: She is not in acute distress.     Appearance: She is cachectic. She is ill-appearing.   HENT:      Head: Normocephalic and atraumatic.      Mouth/Throat:      Mouth: Mucous membranes are moist.      Pharynx: Oropharynx is clear. No oropharyngeal exudate.   Eyes:      General: No scleral icterus.     Extraocular Movements: Extraocular movements intact.   Neck:      Trachea: No tracheal deviation.   Cardiovascular:      Rate and Rhythm: Normal rate and regular rhythm.      Heart sounds: Normal heart sounds. No murmur heard.  Pulmonary:      Effort: Pulmonary effort is normal.      Breath sounds: No stridor. No rales.   Abdominal:      Palpations: Abdomen is soft.      Tenderness: There is no abdominal tenderness.   Musculoskeletal:         General:  Normal range of motion.      Cervical back: Normal range of motion. No rigidity.      Right lower leg: No edema.      Left lower leg: No edema.   Skin:     General: Skin is warm and dry.      Comments: Very tight skin over face and arms   Neurological:      General: No focal deficit present.      Mental Status: She is alert and oriented to person, place, and time.   Psychiatric:         Mood and Affect: Mood normal.         Behavior: Behavior normal.     Dialysis access: Right brachiocephalic AV fistula, patent bruit and thrill    Fluids      Laboratory  Labs reviewed, pertinent labs below.  Recent Labs     06/14/23  1129 06/14/23  1330   WBC  --  3.2*   RBC  --  2.81*   HEMOGLOBIN 6.1* 6.0*   HEMATOCRIT 21.4* 20.8*   MCV  --  74.0*   MCH  --  21.4*   MCHC  --  28.8*   RDW  --  57.3*   PLATELETCT  --  108*     Recent Labs     06/14/23  1330   SODIUM 134*   POTASSIUM 3.8   CHLORIDE 91*   CO2 38*   GLUCOSE 82   BUN 17   CREATININE 1.59*   CALCIUM 7.9*                URINALYSIS:  Lab Results   Component Value Date/Time    COLORURINE Yellow 02/15/2021 1108    CLARITY Clear 02/15/2021 1108    SPECGRAVITY 1.015 02/15/2021 1108    PHURINE 8.5 (A) 02/15/2021 1108    KETONES Negative 02/15/2021 1108    PROTEINURIN 30 (A) 02/15/2021 1108    BILIRUBINUR Negative 02/15/2021 1108    UROBILU 0.2 07/16/2020 0900    NITRITE Negative 02/15/2021 1108    LEUKESTERAS Negative 02/15/2021 1108    OCCULTBLOOD Trace (A) 02/15/2021 1108     UPC  Lab Results   Component Value Date/Time    TOTPROTUR 45.0 (H) 07/16/2020 0900      Lab Results   Component Value Date/Time    CREATININEU 18.93 07/16/2020 0900       Imaging interpreted by radiologist. Imaging reports reviewed with pertinent findings below  No orders to display         Assessment/Plan  27 y.o. female with a history of uncontrolled lupus, hypertension, ESRD on hemodialysis Monday Wednesday Friday who presented 6/14/2023 with prolonged bleeding from AV fistula and failure to  thrive.    1.  ESRD on hemodialysis Monday Wednesday Friday.  Anuric.  Patient has been on dialysis since June 2015.  As patient needs blood transfusion, I will plan on ultrafiltration treatment today to help prevent volume overload with blood transfusion.  Patient currently not listed for transplant due to chronic oxygen use.  Check renal function panel daily.    2.  Dialysis access: Right brachiocephalic AV fistula, sent from her dialysis unit today due to prolonged bleeding, but likely due to needle being placed close to previous suture site.  I do not believe there is need for surgical intervention at this time.    3.  Anemia of chronic disease, uncontrolled, requiring large doses of Epogen.  Patient currently on Retacrit 13,000 units thrice weekly with dialysis, will continue while inpatient.  Check CBC daily.    4.  Severe protein calorie malnutrition, as evidenced by BMI less than 15, hypoalbuminemia, hypophosphatemia.  The patient currently receives intradialytic parenteral nutrition, but I shared with the patient that I do not think this is enough.  I recommend surgical consultation for G-tube or J-tube with enteral tube feeds, but the patient refuses tube feeds at this time.  Unfortunately, we cannot make her undergo surgery.    5.  Hyponatremia, mild.  Due to drinking too much water.  Restrict fluids to 1 L daily.  Limit hypotonic fluids.  Do not order salt tabs.  Check sodium level daily while inpatient.    6.  Pancytopenia, unclear etiology, but I suspect due to ongoing active lupus.  Check CBC daily.    Discussed with R internal medicine resident, Raul Metz MD  Nephrology  [facility]

## 2023-06-14 NOTE — ASSESSMENT & PLAN NOTE
Admitted with profuse bleeding. Resolved.   -Bleeding recurred 6/19 Vascular surgery Dr. Emerson took patient for right fistulogram for treatment of central venous stenosis with venoplasty.  -Outpatient followup with vascular surgeon  3-6 months  -Fistula cleared for dialysis

## 2023-06-14 NOTE — ASSESSMENT & PLAN NOTE
Presents to the ED with hemoglobin 6.1, blood loss from AV fistula site at dialysis center.  Per patient she had blood loss at dialysis last Friday for which she came to the ED and required a stitch to be placed.  Also had blood loss from AV fistula site at dialysis on Monday.  History of having transfusion reactions requiring Benadryl and getting a blood transfusion with dialysis.  Anemia likely due to acute blood loss from AV fistula, will also rule out coagulopathy.  -Nephrology consulted, appreciate recs: Plan for blood transfusion with dialysis  -Benadryl 50 mg IV for dialysis  -PT/INR, TEG pending  -If no lab abnormalities, consideration to reach out to vascular surgeon regarding evaluation of AV fistula and frequent bleeds

## 2023-06-14 NOTE — H&P
[facility] History & Physical Note    Date of Service  6/14/2023    [facility] Team: [facility] Team   Attending: Selam Stevenson M.d.  Senior Resident: Dr. Kt Deras  Intern:  Dr. Raul Anthony  Contact Number: [phone]    Primary Care Physician  Ella Matthew M.D.    Consultants  nephrology    Specialist Names: Dr. Navin Metz    Code Status  Full Code    Chief Complaint  Chief Complaint   Patient presents with    Bleeding/Bruising     Pt BIB [facility] from [facility]. Pt was having dialysis today, after removing needle from site, bleeding continued for approx 45 min. States this happened Friday as well and required a few stitches. Per EMS, bleeding was controlled by the time they arrived, no active bleeding noted at this time.        History of Presenting Illness (HPI):   Lily Nicole is a 27 y.o. female with significant medical history of SLE glomerulonephritis syndrome with  ESRD on hemodialysis (on plaquenil, mycophenolate, prednisone), malnutrition, seizure associated with hypoglycemia, HTN, constipation with chronic opioid use, migraines, pulmonary hypertension on 5LNC who presented 6/14/2023 with acute blood loss anemia from her AV fistula (placed 11-12 yrs ago) while at the dialysis center with hemoglobin 6.1. Per patient she had a bleed for an hour and a half before it stopped, states needle was placed at the suture where she had a bleed last Friday.  Had a bleed also on Monday which stopped.  Presented Friday, 6/9/2023 for AV fistula bleed to the ED where a stitch was placed.  She recently had seen  at [facility] for AV fistulogram 5/30/2023 where it was deemed patent.  She also has shortness of breath with prior transfusions and dialysis sessions, states that Benadryl 50 mg IV does help relieve the symptoms.  Therefore patient will get blood transfusion at dialysis today.  Patient also states she has chronic pain throughout her body for which  she takes p.o. Dilaudid 2 mg, request for IV medication control while inpatient.  She also has constipation, relief with over-the-counter senna docusate.  She notably has had weight loss of 10.5 kg noted from 4/2023 to now.  Per patient she experiences nausea after dialysis, but has tried to eat small meals throughout the day as recommended by her nephrologist as well as drinking protein shakes.  She states she does not want an NG tube or feeding tube as having foreign tubing from her body would cause her great anxiety and insomnia.  She has been receiving some parenteral nutrition during her dialysis sessions.  Followed by Dr. Wong, [facility].  Followed by Dr. Trent, nephrology.     In the ED, BP 90s-140s/70s-90s. Saturating 98-99% on 5L NC. HR 80s-90s. Hgb 6.1. MCV 74. . WBC 3.2. WBC 2.81. Na 134. CO2 38. Cr 1.59. Mg 1.5. PO4 1.5.  Received Dilaudid 2 mg p.o., Ativan 0.5 mg p.o., Zofran 4 mg po    I discussed the plan of care with patient and family.       Review of Systems  Review of Systems   Constitutional:  Positive for malaise/fatigue and weight loss. Negative for chills and fever.   HENT:  Negative for hearing loss.    Eyes:  Negative for blurred vision.   Respiratory:  Positive for shortness of breath. Negative for cough.    Cardiovascular:  Negative for chest pain, palpitations and leg swelling.   Gastrointestinal:  Positive for constipation and nausea. Negative for abdominal pain, blood in stool, diarrhea, melena and vomiting.   Musculoskeletal:  Negative for myalgias.   Skin:  Negative for rash.   Neurological:  Negative for dizziness, focal weakness and headaches.   Psychiatric/Behavioral:  The patient is nervous/anxious.        Past Medical History   has a past medical history of Anemia (01/17/2018), AVF (arteriovenous fistula) ([facility]), Chest pain, Chest tightness, Daytime sleepiness, Dialysis patient ([facility]), Difficulty breathing, ESRD (end stage renal disease) on dialysis ([facility])  (01/17/2018), Gasping for breath, Heart burn, Hypertension (01/17/2018), Indigestion, Lupus (HCC), Migraines (01/17/2018), Painful breathing, Palpitations, Seizure (HCC) (2013), Shortness of breath, Swelling of lower extremity, and Wheezing.    Surgical History   has a past surgical history that includes ronak by laparoscopy (4/5/2010); av fistula creation (Right); angioplasty (01/17/2018); other; other; gastroscopy-endo (12/9/2019); gastroscopy (N/A, 5/30/2020); gastroscopy-endo (9/18/2020); pr upper gi endoscopy,ctrl bleed (11/12/2020); pr upper gi endoscopy,biopsy (11/12/2020); gastroscopy-endo (11/12/2020); pr colonoscopy,diagnostic (1/8/2021); pr upper gi endoscopy,diagnosis (1/8/2021); pr upper gi endoscopy,ctrl bleed (1/8/2021); pr upper gi endoscopy,diagnosis (3/5/2021); pr upper gi endoscopy,diagnosis (N/A, 3/19/2021); pr upper gi endoscopy,ctrl bleed (3/19/2021); pr bronchoscopy,diagnostic (Bilateral, 5/13/2021); pr upper gi endoscopy,diagnosis (N/A, 8/26/2022); and pr upper gi endoscopy,ctrl bleed (N/A, 8/26/2022).     Family History  family history includes Diabetes in her paternal grandmother.   Family history reviewed with patient.     Social History  Tobacco: none    Alcohol: none   Recreational drugs (illegal or prescription): none  Employment: none  Living Situation: mom and sister  Recent Travel: none  Primary Care Provider: Reviewed Ella Matthew MD  Other (stressors, spirituality, exposures): weightloss and decreased appetite     Allergies  Allergies   Allergen Reactions    Cephalexin Rash     Nausea and rash   Hives  .    Clindamycin Rash     Nausea and rash     Hive  .    Hydrocodone Rash and Nausea     Rash      Maxipime [Cefepime] Itching    Methylprednisolone Unspecified     Anxious      Tolerates prednisone     Metoprolol Rash and Nausea     Nausea and rash     Tolerates antenalol    Diagnostic X-Ray Materials Itching     Per patient's mother    Furosemide      Other reaction(s):  Unknown-Explain in Comments  Unable to obtain at this time    Hydroxyzine Hcl Rash    Insulin      Other reaction(s): Other-Reaction in Comments  Patient is very sensitive to insulin administration, especially when treating hyperkalemia.  Has a hx of blood glucose 12.  Closely monitor for severe hypoglycemia that could precipitate seizures. If it's required, then give less insulin/more dextrose to prevent such hypoglycemic episodes.    9/25/2022   Verified by Dr. Kelsie Emerson (endocrinoligy)    Compazine Anxiety    Fentanyl And Related Anxiety    Metoclopramide Anxiety    Tape Rash     Paper tape is ok       Medications  Prior to Admission Medications   Prescriptions Last Dose Informant Patient Reported? Taking?   HYDROmorphone (DILAUDID) 2 MG Tab unk at unk  Yes No   Sig: Take 2 mg by mouth every 8 hours as needed for Severe Pain.   cloNIDine (CATAPRES) 0.2 MG/24HR PATCH WEEKLY patch 6/7/2023 at unk  No No   Sig: APPLY 1 PATCH TOPICALLY ONCE A WEEK   Patient taking differently: Place 1 Patch on the skin every 7 days.   epoetin roque (EPOGEN/PROCRIT) 91146 UNIT/ML Solution unk at unk  Yes No   Sig: Inject  under the skin.   hydroxychloroquine (PLAQUENIL) 200 MG Tab 6/13/2023 at am Patient Yes No   Sig: Take 200 mg by mouth every morning.   mycophenolate sodium (MYFORTIC) 360 MG Tablet Delayed Response tablet 6/13/2023 at am Patient Yes No   Sig: Take 360 mg by mouth every morning.   pantoprazole (PROTONIX) 40 MG Tablet Delayed Response 6/13/2023 at am  Yes No   Sig: Take 40 mg by mouth 2 times a day.   predniSONE (DELTASONE) 5 MG Tab 6/13/2023 at am Patient Yes No   Sig: Take 5 mg by mouth every day.      Facility-Administered Medications: None       Physical Exam  Temp:  [36.7 °C (98 °F)-37.2 °C (98.9 °F)] 36.7 °C (98 °F)  Pulse:  [73-97] 73  Resp:  [12-20] 16  BP: ()/(68-99) 125/85  SpO2:  [98 %-100 %] 100 %  Blood Pressure: (!) 129/91   Temperature: 36.9 °C (98.4 °F)   Pulse: 86   Respiration: 16   Pulse  Oximetry: 100 %       Physical Exam  Constitutional:       Appearance: Normal appearance.   HENT:      Head: Normocephalic and atraumatic.      Right Ear: External ear normal.      Left Ear: External ear normal.      Nose: Nose normal.      Mouth/Throat:      Mouth: Mucous membranes are moist.   Eyes:      Extraocular Movements: Extraocular movements intact.      Conjunctiva/sclera: Conjunctivae normal.      Pupils: Pupils are equal, round, and reactive to light.   Cardiovascular:      Rate and Rhythm: Normal rate and regular rhythm.      Pulses: Normal pulses.      Heart sounds: Normal heart sounds. No murmur heard.  Pulmonary:      Effort: Pulmonary effort is normal. No respiratory distress.      Breath sounds: Normal breath sounds.   Abdominal:      General: Abdomen is flat. Bowel sounds are normal.      Palpations: Abdomen is soft.      Tenderness: There is no abdominal tenderness.   Musculoskeletal:         General: Normal range of motion.      Cervical back: Normal range of motion.      Right lower leg: No edema.      Left lower leg: No edema.   Skin:     General: Skin is warm and dry.   Neurological:      General: No focal deficit present.      Mental Status: She is alert and oriented to person, place, and time.   Psychiatric:         Mood and Affect: Mood normal.         Behavior: Behavior normal.         Thought Content: Thought content normal.         Judgment: Judgment normal.         Laboratory:  Recent Labs     06/14/23  1129 06/14/23  1330   WBC  --  3.2*   RBC  --  2.81*   HEMOGLOBIN 6.1* 6.0*   HEMATOCRIT 21.4* 20.8*   MCV  --  74.0*   MCH  --  21.4*   MCHC  --  28.8*   RDW  --  57.3*   PLATELETCT  --  108*     Recent Labs     06/14/23  1330   SODIUM 134*   POTASSIUM 3.8   CHLORIDE 91*   CO2 38*   GLUCOSE 82   BUN 17   CREATININE 1.59*   CALCIUM 7.9*     Recent Labs     06/14/23  1330 06/14/23  1530   ALTSGPT 7  --    ASTSGOT 11*  --    ALKPHOSPHAT 81  --    TBILIRUBIN 0.6  --    PREALBUMIN  --  5.0*    GLUCOSE 82  --          No results for input(s): NTPROBNP in the last 72 hours.      No results for input(s): TROPONINT in the last 72 hours.    Imaging:  No orders to display       no X-Ray or EKG requiring interpretation    Assessment/Plan:  Problem Representation:   I anticipate this patient will require at least two midnights for appropriate medical management, necessitating inpatient admission because acute blood loss anemia and severe malnutrition.    Patient will need a Telemetry bed on MEDICAL service .  The need is secondary to acute blood loss anemia.    * Anemia associated with acute blood loss- (present on admission)  Assessment & Plan  Presents to the ED with hemoglobin 6.1, blood loss from AV fistula site at dialysis center.  Per patient she had blood loss at dialysis last Friday for which she came to the ED and required a stitch to be placed.  Also had blood loss from AV fistula site at dialysis on Monday.  History of having transfusion reactions requiring Benadryl and getting a blood transfusion with dialysis.  Anemia likely due to acute blood loss from AV fistula, will also rule out coagulopathy.  -Nephrology consulted, appreciate recs: Plan for blood transfusion with dialysis  -Benadryl 50 mg IV for dialysis  -PT/INR, TEG pending  -If no lab abnormalities, consideration to reach out to vascular surgeon regarding evaluation of AV fistula and frequent bleeds  -Repeat H&H every 6 hours    Severe protein-calorie malnutrition (HCC)- (present on admission)  Assessment & Plan  Patient does not want NG tube or feeding tube  -Nutrition consult  -Vitamins, zinc, copper, iron, prealbumin labs pending  -Boost with meals  -Calorie count    Arteriovenous fistula, acquired (HCC)- (present on admission)  Assessment & Plan  Pending coagulation studies may need to reach out to vascular surgeon     Disorder of electrolytes  Assessment & Plan  Post dialysis session today  -monitor and replete as needed K>4,  PO4>3, Mg>2    Metabolic alkalosis  Assessment & Plan  Potential causes of nausea, patient does not have hyperkalemia or diarrhea etiology potentially associated with ESRD  -Monitor    Systemic lupus erythematosus with organ system involvement (HCC)- (present on admission)  Assessment & Plan  - Continue home Plaquenil, mycophenolate, prednisone    Chronic respiratory failure with hypoxia (HCC)- (present on admission)  Assessment & Plan  Home oxygen requirement 5LNC, no increased requirement on admission. Hx of pulmonary HTN  -monitor oxygen requirement    Pancytopenia (HCC)- (present on admission)  Assessment & Plan  Chronic, potential relation to SLE and medications  -continue to monitor   -coagulation labs pending    HTN (hypertension)- (present on admission)  Assessment & Plan  We will monitor blood pressure, patient uses clonidine patch/p.o. and amlodipine 10 mg as needed  -Continue home atenolol 25 mg        VTE prophylaxis: SCDs/TEDs and pharmacologic prophylaxis contraindicated due to blood loss

## 2023-06-14 NOTE — H&P
[facility] INTERNAL MEDICINE HISTORY AND PHYSICAL     PATIENT ID:  NAME:  Lily Nicole  MRN:               [id number]  YOB: 1995    Date of Admission: 6/14/2023     Attending: Selam Stevenson MD    Senior Resident: Kt Deras MD  Wm Resident: Raul Anthony MD    Primary Care Physician:  Ella Matthew MD    CC:  Acute blood loss from right arm AV fistula    HPI: Lily Nicole is a 27 y.o. female with PMHx of anemia, right arm AVF, ESRD requiring dialysis, lupus, and one previous seizure due to hypoglycemia who presented with bleeding from her AV fistula that began after dialysis and lasted for 1.5 hours. The bleeding was profuse and difficult to control requiring multiple gauze. She saw her vascular surgeon (Dr. Go) 2 weeks ago and he stated the fistula was in good condition. She initially came to the ED on Friday for bleeding from her fistula after dialysis for which a stitch was put in which controlled the bleeding. She went to dialysis on Monday and again got profuse bleeding from her fistula for an hour. Today the nurse starting her on dialysis put the needle right next to the stitch and again caused profuse bleeding from the fistula. She noticed that she was dizzy and nauseous while the bleeding occurred but her blood pressure remained stable. She reports diffuse pain everywhere. She is on home O2 5L. She no longer produces urine and relies solely on dialysis. She has a history of upper GI bleed with the last case being on 8/2022, she states she is not having the same symptoms she had during that event. She denies hematemesis, hematochezia, melena, and cough. Of note, she needs to receive benadryl whenever receiving a blood transfusion due to severe itching.     Additionally, she has had severe nausea after dialysis that usually leads to poor oral intake. She has lost significant weight in the last couple of months. She has previously refused NG tube or any type of  "surgical tube placement and continues to refuse.     She has been on steroids (prednisone 5mg) for 14 years and has never been on PCP prophylaxis. She has chronic constipation and uses senna docusate at home which typically helps. She also has chronic pain which is treated with dilaudid.     REVIEW OF SYSTEMS:   Ten systems reviewed and were negative except as noted in the HPI.       ERCourse:  Patient was noted to be hypertensive so she received a dose of dilaudid, zofran, and ativan due to her chronic pain, nausea, and anxiety. Her nephrologist Dr. Trent spoke to ERP (Dr. Horton) regarding the patient's failure to thrive and there was discussion of G-tube which the patient declined. Patient labs showed anemia with Hgb 6.1 and transfusion was set up to go alongside dialysis. Patient to be admitted for ongoing management of her acute blood loss anemia.              PAST MEDICAL HISTORY:  Past Medical History:   Diagnosis Date    Anemia 01/17/2018    AVF (arteriovenous fistula) ([facility])     Right Arm    Chest pain     Chest tightness     Daytime sleepiness     Dialysis patient ([facility])      dialysis, M,W,F Elizabeth/Joni    Difficulty breathing     ESRD (end stage renal disease) on dialysis ([facility]) 01/17/2018    Twan Fu    Gasping for breath     Heart burn     Hypertension 01/17/2018    \"Controlled with medication\"    Indigestion     Lupus ([facility])     Migraines 01/17/2018    Painful breathing     Palpitations     Seizure ([facility]) 2013    from high blood pressure, reports 1 time event    Shortness of breath     Swelling of lower extremity     Wheezing        PAST SURGICAL HISTORY:  Past Surgical History:   Procedure Laterality Date    AR UPPER GI ENDOSCOPY,DIAGNOSIS N/A 8/26/2022    Procedure: ESOPHAGOGASTRODUODENOSCOPY;  Surgeon: Parveen Wood M.D.;  Location: SURGERY [facility];  Service: Gastroenterology    AR UPPER GI ENDOSCOPY,CTRL BLEED N/A 8/26/2022    Procedure: GASTROSCOPY, WITH ARGON PLASMA COAGULATION;  Surgeon: " Parveen Wood M.D.;  Location: [facility];  Service: Gastroenterology    WI BRONCHOSCOPY,DIAGNOSTIC Bilateral 5/13/2021    Procedure: BRONCHOSCOPY, BRONCHOALVEOLAR LAVAGE;  Surgeon: William Spangler M.D.;  Location: [facility];  Service: Pulmonary    WI UPPER GI ENDOSCOPY,DIAGNOSIS N/A 3/19/2021    Procedure: GASTROSCOPY;  Surgeon: Waylon Mcqueen M.D.;  Location: [facility];  Service: Gastroenterology    WI UPPER GI ENDOSCOPY,CTRL BLEED  3/19/2021    Procedure: GASTROSCOPY, WITH ARGON PLASMA COAGULATION;  Surgeon: Waylon Mcqueen M.D.;  Location: [facility];  Service: Gastroenterology    WI UPPER GI ENDOSCOPY,DIAGNOSIS  3/5/2021    Procedure: GASTROSCOPY - W/HEMOSTASIS;  Surgeon: Ej Silva M.D.;  Location: [facility];  Service: Gastroenterology    WI COLONOSCOPY,DIAGNOSTIC  1/8/2021    Procedure: COLONOSCOPY;  Surgeon: Herbert Contreras M.D.;  Location: [facility];  Service: Gastroenterology    WI UPPER GI ENDOSCOPY,DIAGNOSIS  1/8/2021    Procedure: GASTROSCOPY;  Surgeon: Herbert Contreras M.D.;  Location: [facility];  Service: Gastroenterology    WI UPPER GI ENDOSCOPY,CTRL BLEED  1/8/2021    Procedure: GASTROSCOPY, WITH ARGON PLASMA COAGULATION;  Surgeon: Herbert Contreras M.D.;  Location: [facility];  Service: Gastroenterology    WI UPPER GI ENDOSCOPY,CTRL BLEED  11/12/2020    Procedure: GASTROSCOPY, WITH ARGON PLASMA COAGULATION;  Surgeon: Gadiel Whitney M.D.;  Location: [facility];  Service: Gastroenterology    WI UPPER GI ENDOSCOPY,BIOPSY  11/12/2020    Procedure: GASTROSCOPY, WITH BIOPSY;  Surgeon: Gadiel Whitney M.D.;  Location: [facility];  Service: Gastroenterology    GASTROSCOPY-ENDO  11/12/2020    Procedure: GASTROSCOPY;  Surgeon: Gadiel Whitney M.D.;  Location: [facility];  Service: Gastroenterology    GASTROSCOPY-ENDO  9/18/2020    Procedure: GASTROSCOPY;  Surgeon: Gadiel Whitney M.D.;   "Location: SURGERY [facility];  Service: Gastroenterology    GASTROSCOPY N/A 5/30/2020    Procedure: GASTROSCOPY;  Surgeon: Waylon Mcqueen M.D.;  Location: SURGERY [facility];  Service: Gastroenterology    GASTROSCOPY-ENDO  12/9/2019    Procedure: GASTROSCOPY;  Surgeon: Aaron Kam M.D.;  Location: [facility];  Service: Gastroenterology    ANGIOPLASTY  01/17/2018    \"Right Arm AV-Fistulagram & Angioplastyx3\"    ULI BY LAPAROSCOPY  4/5/2010    Performed by SYED MARTELL at SURGERY [facility] ORS    AV FISTULA CREATION Right     OTHER      renal biopsy x 3    OTHER      bone marrow biopsy       FAMILY HISTORY:  Family History   Problem Relation Age of Onset    Diabetes Paternal Grandmother        SOCIAL HISTORY:   Pt lives in [place], [place] with her mom and sister.  Denies smoking, alcohol, and illicit drug use.   She is not sexually active and denies any concern for STD/STI.     DIET:   Orders Placed This Encounter   Procedures    Diet Order Diet: Renal     Standing Status:   Standing     Number of Occurrences:   1     Order Specific Question:   Diet:     Answer:   Renal [8]       ALLERGIES:  Allergies   Allergen Reactions    Cephalexin Rash     Nausea and rash   Hives  .    Clindamycin Rash     Nausea and rash     Hive  .    Hydrocodone Rash and Nausea     Rash      Maxipime [Cefepime] Itching    Methylprednisolone Unspecified     Anxious      Tolerates prednisone     Metoprolol Rash and Nausea     Nausea and rash     Tolerates antenalol    Diagnostic X-Ray Materials Itching     Per patient's mother    Furosemide      Other reaction(s): Unknown-Explain in Comments  Unable to obtain at this time    Hydroxyzine Hcl Rash    Insulin      Other reaction(s): Other-Reaction in Comments  Patient is very sensitive to insulin administration, especially when treating hyperkalemia.  Has a hx of blood glucose 12.  Closely monitor for severe hypoglycemia that could precipitate seizures. If it's " required, then give less insulin/more dextrose to prevent such hypoglycemic episodes.    9/25/2022   Verified by Dr. Kelsie Emerson (endocrinoligy)    Compazine Anxiety    Fentanyl And Related Anxiety    Metoclopramide Anxiety    Tape Rash     Paper tape is ok       OUTPATIENT MEDICATIONS:    Current Facility-Administered Medications:     senna-docusate (PERICOLACE or SENOKOT S) 8.6-50 MG per tablet 2 Tablet, 2 Tablet, Oral, BID **AND** polyethylene glycol/lytes (MIRALAX) PACKET 1 Packet, 1 Packet, Oral, QDAY PRN **AND** magnesium hydroxide (MILK OF MAGNESIA) suspension 30 mL, 30 mL, Oral, QDAY PRN **AND** bisacodyl (DULCOLAX) suppository 10 mg, 10 mg, Rectal, QDAY PRN, Raul Anthony M.D.    acetaminophen (Tylenol) tablet 650 mg, 650 mg, Oral, Q6HRS PRN, Raul Anthony M.D.    ondansetron (ZOFRAN) syringe/vial injection 4 mg, 4 mg, Intravenous, Q4HRS PRN, Raul Anthony M.D.    ondansetron (ZOFRAN ODT) dispertab 4 mg, 4 mg, Oral, Q4HRS PRN, Raul Anthony M.D.    diphenhydrAMINE (BENADRYL) injection 50 mg, 50 mg, Intravenous, Once, Raul Anthony M.D.    [Held by provider] hydroxychloroquine (Plaquenil) tablet 200 mg, 200 mg, Oral, QAM, Raul Anthony M.D.    [Held by provider] predniSONE (DELTASONE) tablet 5 mg, 5 mg, Oral, DAILY, Raul Anthony M.D.    [Held by provider] mycophenolate sodium (MYFORTIC) tablet 360 mg, 360 mg, Oral, QAM, Raul Anthony M.D.    [Held by provider] pantoprazole (PROTONIX) EC tablet 40 mg, 40 mg, Oral, BID, Raul Anthony M.D.    [Held by provider] HYDROmorphone (DILAUDID) tablet 2 mg, 2 mg, Oral, Q8HRS PRN, Raul Anthony M.D.    NS infusion, , Intravenous, Continuous, Raul Anthony M.D.    DIPHENHYDRAMINE HCL 50 MG/ML INJ SOLN, , , ,     Current Outpatient Medications:     cloNIDine (CATAPRES) 0.2 MG/24HR PATCH WEEKLY patch, APPLY 1 PATCH TOPICALLY ONCE A WEEK (Patient taking differently: Place 1 Patch on the skin every 7 days.), Disp: 4  Patch, Rfl: 0    epoetin roque (EPOGEN/PROCRIT) 82093 UNIT/ML Solution, Inject  under the skin., Disp: , Rfl:     pantoprazole (PROTONIX) 40 MG Tablet Delayed Response, Take 40 mg by mouth 2 times a day., Disp: , Rfl:     HYDROmorphone (DILAUDID) 2 MG Tab, Take 2 mg by mouth every 8 hours as needed for Severe Pain., Disp: , Rfl:     mycophenolate sodium (MYFORTIC) 360 MG Tablet Delayed Response tablet, Take 360 mg by mouth every morning., Disp: , Rfl:     predniSONE (DELTASONE) 5 MG Tab, Take 5 mg by mouth every day., Disp: , Rfl:     hydroxychloroquine (PLAQUENIL) 200 MG Tab, Take 200 mg by mouth every morning., Disp: , Rfl:     PHYSICAL EXAM:  Vitals:    23 1302 23 1332 23 1402 23 1432   BP: 118/80 116/77 125/82 (!) 129/91   Pulse: 80 84 84 86   Resp: 16 20 17 16   Temp:       TempSrc:       SpO2:  99% 100% 100%   Weight:       Height:       , Temp (24hrs), Av.9 °C (98.4 °F), Min:36.9 °C (98.4 °F), Max:36.9 °C (98.4 °F)  , Pulse Oximetry: 100 %, O2 (LPM): 5, O2 Delivery Device: Nasal Cannula    General: Pt resting in NAD, cooperative, vary emaciated patient   Skin:  Pink, warm and dry.  No rashes  HEENT: NC/AT. PERRL. EOMI. MMM. No nasal discharge. Oropharynx nonerythematous without exudate/plaques. Small ulcer located on top of scalp. Diffuse hair loss evident.   Neck:  Supple without lymphadenopathy or rigidity.  Lungs:  Symmetrical.  CTAB with no adventitious breath sounds.  Good air movement   Cardiovascular:  Normal S1/S2, RRR without murmurs, rubs, or gallops  Abdomen:  BS+, Soft, NT/ND. No masses noted.  Extremities:  Full range of motion. AV fistula on right arm extends from upper arm to antecubital fossa. 2+ pulses in all extremities. 5/5 strength in all extremities, right arm deferred due to history of bleeding  Spine:  Straight without vertebral anomalies.  CNS:  A&Ox4, follows commands    LAB TESTS:   Recent Labs     23  1129 23  1330   WBC  --  3.2*   RBC  --   2.81*   HEMOGLOBIN 6.1* 6.0*   HEMATOCRIT 21.4* 20.8*   MCV  --  74.0*   MCH  --  21.4*   RDW  --  57.3*   PLATELETCT  --  108*   NEUTSPOLYS  --  93.00*   LYMPHOCYTES  --  3.50*   MONOCYTES  --  2.60   EOSINOPHILS  --  0.00   BASOPHILS  --  0.90   RBCMORPHOLO  --  Present         Recent Labs     06/14/23  1330   SODIUM 134*   POTASSIUM 3.8   CHLORIDE 91*   CO2 38*   BUN 17   CREATININE 1.59*   CALCIUM 7.9*   MAGNESIUM 1.5   PHOSPHORUS 2.0*   ALBUMIN 2.4*       CULTURES:   Results       ** No results found for the last 168 hours. **            IMAGES:  None    CONSULTS:   -Nephrology (Dr. Metz)    ASSESSMENT/PLAN: 27 y.o. female with PMHx of anemia, right arm AVF, ESRD requiring dialysis, lupus, and one previous seizure due to hypoglycemia admitted for acute blood loss anemia requiring transfusion and simultaneous dialysis.     #Acute blood loss anemia  - Hgb 6.0 today after acute blood loss from AV fistula     Plan:  -Transfusion along with dialysis today, requires 50mg of benadryl due to blood transfusion reaction  -Nephrology (Dr. Metz) is following the patient    #Severe Malnutrition  -She has lost ~40 lbs in the last few months  -Current BMI is 14.86  -Patient reports severe nausea, poorly controlled by Zofran preventing her from eating   -Patient has been counseled about the NG tube and G tube by multiple physicians and continues to refuse    Plan:  -Nutrition consult placed  -Vitamin levels pending  -Calorie counting ordered  -Try mirtazapine 15mg Qbedtime for appetite stimulation    #Chronic pain  -Present on admission, likely 2/2 to SLE    Plan:  -Continue home regimen of dilaudid 2mg Q8H PRN  -Dilaudid 0.5mg injection BID PRN for breakthrough pain  -Acetaminophen 650mg tablet Q6H    #SLE  -Present on admission, diagnosed with lupus in 2014    Plan:  -Continue lupus medications (prednisone 5mg PO, Plaquenil 200mg Qday, mycophenolate sodium 360mg Qday)    #Nausea  -Patient complains of severe nausea, most  pronounced after dialysis treatment  -No vomiting reported    Plan:  -Zofran 4mg Q4 hours PO PRN  -Zofran 4mg injection Q4H PRN if needed      Core Measures:  Fluids: NS infusion 30mL/hour  Lines: PIV  Abx: None  Diet: Diet calorie count, renal diet  PPX: Ambulate as tolerated  DISPO: Inpatient for management of acute blood loss anemia requiring transfusion    CODE STATUS: Full Code      Lindsay Augustin, MS3  [facility]

## 2023-06-14 NOTE — ED PROVIDER NOTES
ED Provider Note    Primary care provider: Ella Matthew M.D.  Means of arrival: private vehicle  History obtained from: patient  History limited by: none    CHIEF COMPLAINT  Chief Complaint   Patient presents with    Bleeding/Bruising     Pt NACHO GLORIA from [facility]. Pt was having dialysis today, after removing needle from site, bleeding continued for approx 45 min. States this happened Friday as well and required a few stitches. Per EMS, bleeding was controlled by the time they arrived, no active bleeding noted at this time.        HPI/ROS  Lilymonique Nicole is a 27 y.o. female who presents to the Emergency Department for evaluation of bleeding from her fistula site.  Patient received dialysis as normally scheduled today but after her dialysis fistula site continued to bleed.  It bled for 45 minutes before it stopped.  Upon arrival the bleeding has stopped.  She had a similar episode on 6/9/2023 at which time she was in the emergency department and required a stitch to the fistula site to stop the bleeding.  At this time patient reports she feels well.  She has her chronic anxiety, pain and nausea that she is experiencing but does not feel lightheaded or dizzy.    EXTERNAL RECORDS REVIEWED  Patient has a history of end-stage renal disease secondary to lupus nephritis, is on dialysis.    LIMITATION TO HISTORY   None    OUTSIDE HISTORIAN(S):  None      PAST MEDICAL HISTORY   has a past medical history of Anemia (01/17/2018), AVF (arteriovenous fistula) ([facility]), Chest pain, Chest tightness, Daytime sleepiness, Dialysis patient ([facility]), Difficulty breathing, ESRD (end stage renal disease) on dialysis ([facility]) (01/17/2018), Gasping for breath, Heart burn, Hypertension (01/17/2018), Indigestion, Lupus ([facility]), Migraines (01/17/2018), Painful breathing, Palpitations, Seizure ([facility]) (2013), Shortness of breath, Swelling of lower extremity, and Wheezing.    SURGICAL HISTORY   has a past surgical history that includes  [place] by laparoscopy (4/5/2010); av fistula creation (Right); angioplasty (01/17/2018); other; other; gastroscopy-endo (12/9/2019); gastroscopy (N/A, 5/30/2020); gastroscopy-endo (9/18/2020); upper gi endoscopy,ctrl bleed (11/12/2020); upper gi endoscopy,biopsy (11/12/2020); gastroscopy-endo (11/12/2020); colonoscopy,diagnostic (1/8/2021); upper gi endoscopy,diagnosis (1/8/2021); upper gi endoscopy,ctrl bleed (1/8/2021); upper gi endoscopy,diagnosis (3/5/2021); upper gi endoscopy,diagnosis (N/A, 3/19/2021); upper gi endoscopy,ctrl bleed (3/19/2021); bronchoscopy,diagnostic (Bilateral, 5/13/2021); upper gi endoscopy,diagnosis (N/A, 8/26/2022); and upper gi endoscopy,ctrl bleed (N/A, 8/26/2022).    SOCIAL HISTORY  Social History     Tobacco Use    Smoking status: Never    Smokeless tobacco: Never   Vaping Use    Vaping Use: Never used   Substance Use Topics    Alcohol use: No    Drug use: No      Social History     Substance and Sexual Activity   Drug Use No       FAMILY HISTORY  Family History   Problem Relation Age of Onset    Diabetes Paternal Grandmother        CURRENT MEDICATIONS  Home Medications       Reviewed by Viv Pollard R.N. (Registered Nurse) on 06/14/23 at 1046  Med List Status: Partial     Medication Last Dose Status   amLODIPine (NORVASC) 10 MG Tab  Active   atenolol (TENORMIN) 25 MG Tab  Active   cloNIDine (CATAPRES) 0.2 MG/24HR PATCH WEEKLY patch  Active   cloNIDine (CATAPRES) 0.3 MG Tab  Active   epoetin roque (EPOGEN/PROCRIT) 12836 UNIT/ML Solution  Active   ferrous sulfate 324 (65 Fe) MG Tablet Delayed Response EC tablet  Active   HYDROmorphone (DILAUDID) 4 MG Tab  Active   hydroxychloroquine (PLAQUENIL) 200 MG Tab  Active   metoprolol tartrate (LOPRESSOR) 25 MG Tab  Active   mycophenolate sodium (MYFORTIC) 360 MG Tablet Delayed Response tablet  Active   Naloxone (NARCAN) 4 MG/0.1ML Liquid  Active   ondansetron (ZOFRAN) 4 MG Tab tablet  Active   pantoprazole (PROTONIX) 40 MG Tablet Delayed  "Response  Active   predniSONE (DELTASONE) 5 MG Tab  Active                    ALLERGIES  Allergies   Allergen Reactions    Cephalexin Rash     Nausea and rash   Hives  .    Clindamycin Rash     Nausea and rash     Hive  .    Hydrocodone Rash and Nausea     Rash      Maxipime [Cefepime] Itching    Methylprednisolone Unspecified     Anxious      Tolerates prednisone     Metoprolol Rash and Nausea     Nausea and rash     Tolerates antenalol    Diagnostic X-Ray Materials Itching     Per patient's mother    Furosemide      Other reaction(s): Unknown-Explain in Comments  Unable to obtain at this time    Hydroxyzine Hcl Rash    Insulin      Other reaction(s): Other-Reaction in Comments  Patient is very sensitive to insulin administration, especially when treating hyperkalemia.  Has a hx of blood glucose 12.  Closely monitor for severe hypoglycemia that could precipitate seizures. If it's required, then give less insulin/more dextrose to prevent such hypoglycemic episodes.    9/25/2022   Verified by Dr. Kelsie Emerson (endocrinoligy)    Compazine Anxiety    Fentanyl And Related Anxiety    Metoclopramide Anxiety    Tape Rash     Paper tape is ok       PHYSICAL EXAM  VITAL SIGNS: /77   Pulse 91   Temp 36.9 °C (98.4 °F) (Temporal)   Resp 20   Ht 1.651 m (5' 5\")   Wt 40.5 kg (89 lb 4.6 oz)   SpO2 98%   BMI 14.86 kg/m²   Vitals reviewed by myself.  Physical Exam  Nursing note and vitals reviewed.  Constitutional: Cachectic, chronically ill-appearing  HENT: Head is normocephalic and atraumatic.  Eyes: extra-ocular movements intact  Cardiovascular: Regular rate and  regular rhythm.   Pulmonary/Chest: Breath sounds normal. No wheezes or rales.   Abdominal: Soft and non-tender. No distention.    Musculoskeletal: Extremities exhibit normal range of motion without edema or tenderness.  Dialysis fistula site in the right upper extremity is clean and dry with no active bleeding.  Stitch is noted to be in place.  Palpable " thrill  Neurological: Awake and alert  Skin: Skin is warm and dry. No rash.         DIAGNOSTIC STUDIES:  LABS  Labs Reviewed   HEMOGLOBIN AND HEMATOCRIT - Abnormal; Notable for the following components:       Result Value    Hemoglobin 6.1 (*)     Hematocrit 21.4 (*)     All other components within normal limits   CBC WITH DIFFERENTIAL   COMP METABOLIC PANEL   COD (ADULT)   MAGNESIUM   PHOSPHORUS   TRANSFUSE RED BLOOD CELLS-NURSING COMMUNICATION (UNITS)       All labs reviewed and independently interpreted by myself        COURSE & MEDICAL DECISION MAKING    ED Observation Status? No; Patient does not meet criteria for ED Observation.     INITIAL ASSESSMENT AND PLAN    Patient is a 27-year-old female who comes in from bleeding from fistula site.  Differential diagnosis includes fistula bleed, anemia, venous bleeding.  On my exam bleeding has resolved.  Will obtain hemoglobin and hematocrit to assess her hemoglobin as baseline she does have anemia.      ER COURSE AND FINAL DISPOSITION   Patient's initial vitals notable for hypertension.  She is having her chronic pain, nausea and anxiety.  Therefore she is given a dose of Dilaudid, Zofran and Ativan.    While awaiting her blood results I did receive a call from her primary nephrologist Dr. Trent who expressed concern regarding patient's failure to thrive.  Patient has been unable to gain weight despite maximal therapy in outpatient setting.  She is recommending discussing tube feeding with patient.  I had a long discussion with patient and mother regarding failure to thrive and alternatives to nutrition therapy.  Patient reports that she has had tube feeds in the past and does not want to pursue them at this time.  She does not want to be hospitalized to have a G-tube placed at this time.    Patient's labs returned and are notable for anemia with a hemoglobin of 6.1.  At this time patient is advised that she should have transfusion given that her hemoglobin is less  than 7 and she is amenable to this.  I will hospitalize her for this because patient does frequently become short of breath after transfusions and requires repeat dialysis.  I am concerned that if she has dialysis again she may also rebleed from her fistula site.  Therefore she will be hospitalized for ongoing monitor.  I spoke with on-call nephrologist Dr. Metz to update him on plan of care and he will evaluate patient to see if she will require dialysis after transfusion.  I discussed with [facility] internal medicine team who will hospitalize patient for ongoing management.  Patient is hospitalized in guarded condition.    Blood Consent  I have explained to the patient the risks and benefits of transfusion of blood products.  This includes, as appropriate, the risk of mild allergic reaction, hemolytic reaction, transfusion-associated lung injury, febrile reactions, circulatory or iron overload, and infection.    We discussed possible alternatives and their risks, including directed donation, autologous transfusion, and no transfusion, including IV or oral iron supplementation, as appropriate.  I believe the patient understands the risks and benefits and was able to express understanding.      ADDITIONAL PROBLEM LIST AND RESOURCE UTILIZATION    Additional problems aside from the chief complaint that I have addressed: none    I have discussed management of the patient with the following physicians and HONG's: Dr. Trent, Dr. Metz, [facility] Internal Medicine     Discussion of management with other [facility] [place] or appropriate source(s): None     Escalation of care considered, and ultimately not performed: see above.     Barriers to care at this time, including but not limited to: none.     Decision tools and prescription drugs considered including, but not limited to: see above.    Please see review of records as noted above      FINAL IMPRESSION  1. Anemia, unspecified type    2. Acute blood loss anemia

## 2023-06-14 NOTE — PROGRESS NOTES
[facility] Internal Medicine SENIOR ADMIT Note    Date of Service  6/14/2023    R Team: R IM Orange Team   Attending: Selam Stevenson M.d.  Senior Resident: Dr. Kt Deras  Intern:  Dr. Raul Anthony  Contact Number: [phone]    Chief Complaint  Acute blood loss anemia    Hospital Course  Patient is a 28yo female with PMH of ESRD on MWF HD, SLE c/b lupus nephrisits on hydroxychloroquine/mycophenolate/prednisone, chronic pain on opioids, and CHRF on 5L NC that presents from dialysis after acute blood loss from RUE fistula. Patient states that their fistula bled for 45min today before stopping. States that on Friday it also bled for approximately one hour after going home from dialysis. Bleeding had stopped on presentation to the ED, but with Hgb 6.1. States that their last blood transfusion 2mo ago [facility] at [facility]; not found in EMR. Denies dizziness, lightheadedness, hemoptysis, hematochezia, or melena. Patient is followed by Dr. Trent, nephrology. Followed by Dr. Dotson, vascular surgery; fistula placed 11-12yrs and recent follow-up 2wks ago. Followed by Dr. Wong, [facility].    In the ED, BP 90s-140s/70s-90s. Saturating 98-99% on 5L NC. HR 80s-90s. Hgb 6.1. MCV 74. . WBC 3.2. WBC 2.81. Na 134. CO2 38. Cr 1.59. Mg 1.5. PO4 1.5.    Consultants/Specialty  none    Code Status  Full Code    Review of Systems  Review of Systems   Constitutional:  Negative for chills, diaphoresis, fever and malaise/fatigue.   Respiratory:  Negative for cough, hemoptysis and shortness of breath.    Cardiovascular:  Negative for chest pain and leg swelling.   Gastrointestinal:  Negative for abdominal pain, blood in stool, constipation, diarrhea, melena, nausea and vomiting.   Genitourinary:         Anuric   Musculoskeletal:         All over body pain, chronic   Neurological:  Negative for dizziness, weakness and headaches.        Physical Exam  Temp:  [36.9 °C (98.4 °F)] 36.9 °C (98.4  °F)  Pulse:  [80-97] 86  Resp:  [12-20] 16  BP: ()/(72-99) 129/91  SpO2:  [98 %-100 %] 100 %    Physical Exam  Constitutional:       General: She is not in acute distress.     Appearance: Normal appearance. She is not toxic-appearing.      Comments: Cachectic   HENT:      Head: Normocephalic and atraumatic.      Nose: Nose normal.      Mouth/Throat:      Mouth: Mucous membranes are moist.      Pharynx: No oropharyngeal exudate or posterior oropharyngeal erythema.   Eyes:      General: No scleral icterus.     Extraocular Movements: Extraocular movements intact.      Conjunctiva/sclera: Conjunctivae normal.      Pupils: Pupils are equal, round, and reactive to light.   Cardiovascular:      Rate and Rhythm: Normal rate and regular rhythm.      Heart sounds: Normal heart sounds. No murmur heard.     No friction rub. No gallop.   Pulmonary:      Effort: No respiratory distress.      Breath sounds: Normal breath sounds. No stridor. No wheezing, rhonchi or rales.   Abdominal:      General: There is no distension.      Palpations: Abdomen is soft. There is no mass.      Tenderness: There is no abdominal tenderness. There is no guarding or rebound.      Hernia: No hernia is present.   Musculoskeletal:         General: No swelling or tenderness. Normal range of motion.      Right lower leg: No edema.      Left lower leg: No edema.   Lymphadenopathy:      Cervical: No cervical adenopathy.   Skin:     General: Skin is warm and dry.      Coloration: Skin is not jaundiced or pale.   Neurological:      Mental Status: She is alert and oriented to person, place, and time. Mental status is at baseline.      Cranial Nerves: No cranial nerve deficit.   Psychiatric:         Mood and Affect: Mood normal.         Behavior: Behavior normal.         Thought Content: Thought content normal.         Judgment: Judgment normal.         Fluids  No intake or output data in the 24 hours ending 06/14/23 1538    Laboratory  Recent Labs      06/14/23  1129 06/14/23  1330   WBC  --  3.2*   RBC  --  2.81*   HEMOGLOBIN 6.1* 6.0*   HEMATOCRIT 21.4* 20.8*   MCV  --  74.0*   MCH  --  21.4*   MCHC  --  28.8*   RDW  --  57.3*   PLATELETCT  --  108*     Recent Labs     06/14/23  1330   SODIUM 134*   POTASSIUM 3.8   CHLORIDE 91*   CO2 38*   GLUCOSE 82   BUN 17   CREATININE 1.59*   CALCIUM 7.9*                   Imaging  No orders to display        Assessment/Plan  Problem Representation:    Patient is a 26yo female with PMH of ESRD on MWF HD, SLE c/b lupus nephrisits on hydroxychloroquine/mycophenolate/prednisone, chronic pain on opioids, and CHRF on 5L NC that presents from dialysis after acute blood loss from RUE fistula. Blood loss likely 2/2 fistula; receiving 1u pRBC with concurrent HD.    #Acute blood loss anemia  #Pancytopenia  #Microcytosis  Likely 2/2 acute blood loss from fistula.  -Transfuse 1u pRBC while receiving HD; coordinated by Dr. Metz  -Order iron panel/ferritin, INR, TEG  -Consider contacting vascular surgeon Dr. Dotson at [facility]    #ESRD  #SLE  #Lupus nephritis  -Continue home meds  -Continue MWF HD; nephrology consulted    #Chronic hypoxic respiratory failure  Unknown etiology. CO2 38. 5L baseline at home.  -Consider TTE  -Outpatient PFTs    #Hypomagnesemia  #Hyponatremia  #Hypophosphatemia  Likely 2/2 malnutrition.  -Replete to K>4, PO4>3, Mg>2  -CTM    #Severe protein-calorie malnutrition  -Nutrition consult  -Order TSH, B12/folate, zinc, copper, prealbumin, vitD/E/A/K  -Start on mirtazapine    #Chronic pain  -Continue home pain regimen    Please refer to Dr. Anthony's H&P for further info.    VTE prophylaxis: SCDs/TEDs

## 2023-06-15 PROBLEM — R79.89 LOW SERUM T4 LEVEL: Status: ACTIVE | Noted: 2023-01-01

## 2023-06-15 PROBLEM — D50.9 FE DEFICIENCY ANEMIA: Status: ACTIVE | Noted: 2023-01-01

## 2023-06-15 NOTE — PROGRESS NOTES
Received nursing communication requesting if x1 point-of-care glucose order can be placed to evaluate patient's glucose level due to concern that during a previous admission, when the patient had low glucose, the patient had a seizure.    One time point-of-care glucose order was initially placed and result was 60.  Hypoglycemia protocol was initiated.  Patient initially received juice however that led to nausea and vomiting which was not controlled with already ordered IV Zofran.  Patient was noted to have worsening anxiety, nausea, and vomiting.  Repeat point-of-care glucose was down to 37.    Promptly evaluated the patient at bedside, patient did not appear to be tremulous or diaphoretic. Per hypoglycemia protocol, IV dextrose 10% was being initiated.  Patient requested antianxiety medication.  Patient noted to have multiple allergies including hydroxyzine and stated that she tolerated IV Ativan in the past well.  Thru shared decision-making with the patient and in order to help the patient's nausea, vomiting, anxiety, 0.5 mg of IV Ativan was ordered. Patient's vitals were relatively stable with blood pressure 150/97, pulse of 87, temperature 97.6, respiratory rate of 16.  Asked nursing staff to give daily prednisone a little earlier if possible. I also ordered one-time dose of D50 to give if the patient's blood glucose remains below 80.  Trying to limit extra fluids in setting of ESRD however recognize that this is more acute.    Patient was later reevaluated and POC blood glucose was 124.  Patient tolerated her oral prednisone.  Patient stated that her appetite was returning and denied any active nausea or vomiting.  Advised the patient to not eat anything if she started to feel drowsy.  Aspiration precautions were initiated.    Morning labs were also reviewed and showed stability of hemoglobin at 9.1.  Chemistry panel showed glucose of 61 however that was collected before D10 infusion.

## 2023-06-15 NOTE — DIETARY
"Nutrition Support Assessment   Day 1 of admit.  Lily Nicole is a 27 y.o. female with admitting DX of Anemia associated with acute blood loss     Current problem list:  Anemia associated w/ acute blood loss  Severe protein-calorie malnutrition (PCM)  Arteriovenous fistula  Disorder of electrolytes  Metabolic alkalosis  Systemic lupus erythematosus w/ organ system involvement  Chronic respiratory failure w/ hypoxia  Pancytopenia  HTN       Assessment:  Estimated Nutritional Needs: based on:   Height: 165.1 cm (5' 5\")  Weight: 40.5 kg (89 lb 4.6 oz)  Body mass index is 14.86 kg/m²., BMI classification: underweight    Diet order/intake: renal w/ Boost TID/ 25-50% at breakfast this morning.     Calculation/Equation:   MSJ: REE=1151 x 1.4= 1611 kcals    - 8935-6247 kcals (39-41 kcals/kg)    - 53-61 g protein (1.3-1.5 kcals/kg)     Evaluation:   Per H&P, wt loss of 10.5 kg (21%) noted from 4/2023 until now. Wt loss is significant. She reports nausea after dialysis, but has tried to eat small meals throughout the day as recommended by her nephrologist as well as drinking protein shakes. She does not want NG tube or feeding tube.   RD visited pt in her room. She was eating her lunch. Pt said that she was eating fine prior to this hospitalization. She normally eats 2 meals and snacks. She said that she was eating protein foods and an RD is following her at the dialysis clinic. She said wt loss in the past was due to not eating well then.   Pt w/ severe muscle loss in the temple and clavicle region and severe fat loss in the orbital region.   Pt likes the Boost supplements. She only wants 2 a day. This will be provided at breakfast and dinner.   Labs: 6/15/23: sodium=130, potassium=4.1, glucose=61. 6/14/23: phos=2.6, mag=1.6. POC glucose: 161, 58, 124, 37, 60  Meds: folic acid (refused), Remeron, omeprazole (refused), prednisone, MVI + minerals  Pt may be at risk for refeeding. Daily phos/mag ordered. She may also " benefit form thiamine supplementation.      Malnutrition Risk: Pt meets ASPEN criteria for severe malnutrition in the context of chronic illness r/t hx or poor PO intake AEB 21% wt loss x 2 months and severe muscle and fat loss.      Recommendations/Plan:  Continue with Boost supplements BID   Encourage intake of >50%  Document intake of all meals and supplements as % taken in ADL's to provide interdisciplinary communication across all shifts.   Record on meal ticket amount consumed of each item on meal tray. Place meal ticket in calorie count envelope posted outside of pt's room. Calorie count is from 6/15 (lunch) to 6/17 (lunch).  Daily phos and mag to follow for refeeding.   Thiamine supplementation per MD.   Monitor weight.  Nutrition rep will continue to see patient for ongoing meal and snack preferences.       RD will follow

## 2023-06-15 NOTE — PROGRESS NOTES
[facility]     Dialysis treatment started at 1542 and completed at 1712. Nephrologist Dr. Metz notified of treatment start.     NET UF: 1000 mL     Patient is A&Ox4, no pain and discomfort reported. VS within normal limits. Right UA AVF has Bruitt and thrill and no signs and symptoms of infection. AVF cannulated and no access issues encountered. Lines are patent w/ good blood flow. Lab results and orders reviewed prior to treatment start.    Pre-med benadryl  given prior to blood transfusions. 1 Unit PRBC checked and verified with another dialysi nurse.     Patient completed and tolerated dialysis treatment well w/o complications. VS stayed within normal limits. Left UA AVF Deaccessed, No bleeding noted after needle removal. Manual access pressure applied x 15 mins on each needle site. Patient and PCN instructed to monitor Right UA AVF site for bleeding and to re-enforce pressure dressing if needed.     1745 Report given to PCN SATISH Soto RN    See Dialysis treatment flow sheet for details.

## 2023-06-15 NOTE — PROGRESS NOTES
Hypoglycemia Intervention    Hypoglycemia protocol intervention:  Blood glucose 56 at 0835. Asymptomatic.   Intervention: 25 g IV dextrose per MAR   Repeat blood glucose 126 at 0927.  Intervention: no further intervention needed  Dr. Anthony notified of the above at 0840 at bedside.

## 2023-06-15 NOTE — ASSESSMENT & PLAN NOTE
Chronic, potential relation to SLE and malnutrition. Leukopenia fluctuates.  - Monitor   - See plan for anemia

## 2023-06-15 NOTE — CARE PLAN
The patient is Stable - Low risk of patient condition declining or worsening    Shift Goals  Clinical Goals: Signs of bleeding , Less pain , less nausea  Patient Goals: Rest    Progress made toward(s) clinical / shift goals:    Problem: Pain - Standard  Goal: Alleviation of pain or a reduction in pain to the patient’s comfort goal  Outcome: Not Progressing  Flowsheets (Taken 6/15/2023 0155)  OB Pain Intervention:   Medication - See MAR   Rest   Repositioned  Pain Rating Scale (NPRS): 7  Note: Pain med given at bedtime, pt resting well.     Problem: Risk for Bleeding  Goal: Patient will take measures to prevent bleeding and recognizes signs of bleeding that need to be reported immediately to a health care professional  Outcome: Progressing  Note: Pt hemoglobin is at 8.1 now after 1 unit of RBC , teaching done and pt verbalized understanding     Problem: Risk for Bleeding  Goal: Patient will not experience bleeding as evidenced by normal blood pressure, stable hematocrit and hemoglobin levels and desired ranges for coagulation profiles  Note: Fistula site is clean, dry and intact, no signs of bleeding       Patient is not progressing towards the following goals:      Problem: Pain - Standard  Goal: Alleviation of pain or a reduction in pain to the patient’s comfort goal  Outcome: Not Progressing  Flowsheets (Taken 6/15/2023 0155)  OB Pain Intervention:   Medication - See MAR   Rest   Repositioned  Pain Rating Scale (NPRS): 7  Note: Pain med given at bedtime, pt resting well.

## 2023-06-15 NOTE — ASSESSMENT & PLAN NOTE
From poor PO intake and now has high risk for refeeding syndrome.   - Monitoring closely and repleting as needed K>4, PO4>3, Mg>2.

## 2023-06-15 NOTE — DISCHARGE PLANNING
Outpatient Dialysis Note     Home HD clinic:     [facility]   [address] [place] [id number]     Schedule: Mon, Wed, Fri   Time: 6:00 AM     Patient runs for 3 hrs.      Patient is followed by Dr. Trent.      Spoke with Mary at facility who confirmed patient information.   Forwarded records for review.     Xitlaly Reyes   Dialysis Coordinator / [facility]  Ph: [phone]

## 2023-06-15 NOTE — PROGRESS NOTES
"Nephrology Daily Progress Note    Date of Service  6/15/2023    Chief Complaint  27 y.o. female with a history of uncontrolled lupus, hypertension, ESRD on hemodialysis Monday Wednesday Friday who presented 6/14/2023 with prolonged bleeding from AV fistula and failure to thrive.    Interval Problem Update  6/15-patient had additional ultrafiltration treatment yesterday with 1 L removed.  Tolerated blood transfusion yesterday.  Complains of joint pain all over, and poor appetite.  She remains adamant she would never want \"tubes sticking out of me\" for enteral nutrition support.    Review of Systems  Review of Systems   Constitutional:  Negative for fever.   Respiratory:  Negative for shortness of breath.    Cardiovascular:  Negative for chest pain.   Gastrointestinal:  Positive for nausea. Negative for abdominal pain and vomiting.   Musculoskeletal:  Positive for back pain, joint pain and neck pain.   All other systems reviewed and are negative.       Physical Exam  Temp:  [36.2 °C (97.1 °F)-37.2 °C (98.9 °F)] 36.4 °C (97.5 °F)  Pulse:  [61-90] 74  Resp:  [16-17] 16  BP: (112-150)/(68-97) 143/86  SpO2:  [90 %-100 %] 98 %    Physical Exam  Constitutional:       General: She is not in acute distress.     Appearance: She is cachectic.      Interventions: Nasal cannula in place.   HENT:      Head: Normocephalic and atraumatic.      Mouth/Throat:      Mouth: Mucous membranes are moist.      Pharynx: Oropharynx is clear. No oropharyngeal exudate.   Eyes:      General: No scleral icterus.     Extraocular Movements: Extraocular movements intact.   Neck:      Trachea: No tracheal deviation.   Cardiovascular:      Rate and Rhythm: Normal rate and regular rhythm.      Heart sounds: Normal heart sounds. No murmur heard.  Pulmonary:      Effort: Pulmonary effort is normal.      Breath sounds: No stridor. No rales.   Abdominal:      Palpations: Abdomen is soft.      Tenderness: There is no abdominal tenderness.   Musculoskeletal: "      Cervical back: Normal range of motion. No rigidity.      Right lower leg: No edema.      Left lower leg: No edema.      Comments: Patient unable to make full fists due to skin tightening   Skin:     General: Skin is warm and dry.      Comments: Skin is taut and shiny   Neurological:      General: No focal deficit present.      Mental Status: She is alert and oriented to person, place, and time.   Psychiatric:         Mood and Affect: Mood normal.         Behavior: Behavior normal.     Dialysis access: Right upper arm AV fistula, patent bruit and thrill    Fluids    Intake/Output Summary (Last 24 hours) at 6/15/2023 1435  Last data filed at 6/15/2023 1000  Gross per 24 hour   Intake 880 ml   Output 1500 ml   Net -620 ml       Laboratory  Labs reviewed, pertinent labs below.  Recent Labs     06/14/23  1330 06/14/23  1715 06/15/23  0335   WBC 3.2*  --  3.2*   RBC 2.81*  --  3.89*   HEMOGLOBIN 6.0* 8.1* 9.1*   HEMATOCRIT 20.8* 26.1* 29.5*   MCV 74.0*  --  75.8*   MCH 21.4*  --  23.4*   MCHC 28.8*  --  30.8*   RDW 57.3*  --  58.2*   PLATELETCT 108*  --  123*     Recent Labs     06/14/23  1330 06/15/23  0335   SODIUM 134* 130*   POTASSIUM 3.8 4.1   CHLORIDE 91* 90*   CO2 38* 32   GLUCOSE 82 61*   BUN 17 21   CREATININE 1.59* 2.21*   CALCIUM 7.9* 8.3*     Recent Labs     06/14/23  1330   INR 1.47*           URINALYSIS:  Lab Results   Component Value Date/Time    COLORURINE Yellow 02/15/2021 1108    CLARITY Clear 02/15/2021 1108    SPECGRAVITY 1.015 02/15/2021 1108    PHURINE 8.5 (A) 02/15/2021 1108    KETONES Negative 02/15/2021 1108    PROTEINURIN 30 (A) 02/15/2021 1108    BILIRUBINUR Negative 02/15/2021 1108    UROBILU 0.2 07/16/2020 0900    NITRITE Negative 02/15/2021 1108    LEUKESTERAS Negative 02/15/2021 1108    OCCULTBLOOD Trace (A) 02/15/2021 1108     [facility]  Lab Results   Component Value Date/Time    TOTPROTUR 45.0 (H) 07/16/2020 0900      Lab Results   Component Value Date/Time    CREATININEU 18.93 07/16/2020  0900         Imaging interpreted by radiologist. Imaging reports reviewed with pertinent findings below  No orders to display         Current Facility-Administered Medications   Medication Dose Route Frequency Provider Last Rate Last Admin    dextrose 10 % BOLUS 25 g  25 g Intravenous Q15 MIN PRN Teddy Golden M.D. 999 mL/hr at 06/15/23 0901 25 g at 06/15/23 0901    diphenhydrAMINE (BENADRYL) tablet/capsule 50 mg  50 mg Oral Q6HRS PRN Raul Anthony M.D.   50 mg at 06/15/23 1225    senna-docusate (PERICOLACE or SENOKOT S) 8.6-50 MG per tablet 2 Tablet  2 Tablet Oral BID Raul Anthony M.D.        And    polyethylene glycol/lytes (MIRALAX) PACKET 1 Packet  1 Packet Oral QDAY PRN Raul Anthony M.D.        And    magnesium hydroxide (MILK OF MAGNESIA) suspension 30 mL  30 mL Oral QDAY PRN Raul Anthony M.D.        And    bisacodyl (DULCOLAX) suppository 10 mg  10 mg Rectal QDAY PRN Raul Anthony M.D.        acetaminophen (Tylenol) tablet 650 mg  650 mg Oral Q6HRS PRN Raul Anthony M.D.        ondansetron (ZOFRAN) syringe/vial injection 4 mg  4 mg Intravenous Q4HRS PRN Raul Anthony M.D.   4 mg at 06/15/23 0308    ondansetron (ZOFRAN ODT) dispertab 4 mg  4 mg Oral Q4HRS PRN Raul Anthony M.D.   4 mg at 06/15/23 0836    hydroxychloroquine (Plaquenil) tablet 200 mg  200 mg Oral QAM Raul Anthony M.D.   200 mg at 06/14/23 2124    predniSONE (DELTASONE) tablet 5 mg  5 mg Oral DAILY Raul Anthony M.D.   5 mg at 06/15/23 0442    HYDROmorphone (DILAUDID) tablet 2 mg  2 mg Oral Q8HRS PRN Raul Anthony M.D.   2 mg at 06/15/23 0307    mirtazapine (Remeron) tablet 15 mg  15 mg Oral QHS Raul Anthony M.D.   15 mg at 06/14/23 2124    HYDROmorphone (Dilaudid) injection 0.5 mg  0.5 mg Intravenous BID PRN Raul Anthony M.D.   0.5 mg at 06/14/23 2011    omeprazole (PRILOSEC) capsule 20 mg  20 mg Oral BID Selam Stevenson M.D.   20 mg at 06/14/23 2123    mycophenolate  (CELLCEPT) capsule 500 mg  500 mg Oral QAM Selam Stevenson M.D.   500 mg at 06/14/23 2124    folic acid (FOLVITE) tablet 1 mg  1 mg Oral DAILY Selam Stevenson M.D.        therapeutic multivitamin-minerals (THERAGRAN-M) tablet 1 Tablet  1 Tablet Oral DAILY Selam Stevenson M.D.             Assessment/Plan  27 y.o. female with a history of uncontrolled lupus, hypertension, ESRD on hemodialysis Monday Wednesday Friday who presented 6/14/2023 with prolonged bleeding from AV fistula and failure to thrive.     1.  ESRD on hemodialysis Monday Wednesday Friday.  Anuric.  Patient has been on dialysis since June 2015.  No acute need for dialysis treatment today.  She has not listed for transplant due to chronic oxygen use.  Check renal function panel daily.  Her target weight is now down to 40 kg, but patient has been able to achieve 39.5 kg.     2.  Dialysis access: Right brachiocephalic AV fistula, patent and functional.  I do not believe there is any need for surgical intervention or consultation at this time.     3.  Anemia of chronic disease, uncontrolled, requiring large doses of Epogen.  Continue Retacrit 13,000 units thrice weekly with dialysis.  Check CBC daily.     4.  Severe protein calorie malnutrition, as evidenced by BMI less than 15, hypoalbuminemia, poor appetite.  Persistent.  I explained to the patient I am concerned she is dying of starvation.  She is hoping to achieve better nutrition with intradialytic parenteral nutrition.  She is refusing G-tube or J-tube placement at this time.     5.  Hyponatremia, persistent.  Due to drinking too much water and not eating enough food with solutes.  Recommend restrict fluids to 1 L daily.  Limit hypotonic fluids.  Do not order salt tabs.  Encourage nutrition.  Check sodium level daily.    6.  Pancytopenia, persistent, likely due to uncontrolled lupus.  Check CBC daily.  Continue Retacrit for anemia as above.     Discussed with [facility] internal medicine attending,  Dr. Selam Metz MD  Nephrology  [facility]

## 2023-06-15 NOTE — CARE PLAN
The patient is Stable - Low risk of patient condition declining or worsening    Shift Goals  Clinical Goals: monitor bleeds, bg  Patient Goals: manage pain    Progress made toward(s) clinical / shift goals:  calorie count for meals. Hypoglycemic this morning, see progress note.       Problem: Risk for Bleeding  Goal: Patient will take measures to prevent bleeding and recognizes signs of bleeding that need to be reported immediately to a health care professional  Outcome: Progressing  Goal: Patient will not experience bleeding as evidenced by normal blood pressure, stable hematocrit and hemoglobin levels and desired ranges for coagulation profiles  Outcome: Progressing   -no bleeding noted this shift.      Problem: Pain - Standard  Goal: Alleviation of pain or a reduction in pain to the patient’s comfort goal  Outcome: Progressing   -pain managed with prn dilaudid.

## 2023-06-15 NOTE — ASSESSMENT & PLAN NOTE
Hx of pulmonary HTN with unclear cause. Max RVSP was 35 mmHg, with ground glass opacities in the past - ddx for pulmonology when evaluated in 2022 were lupus pneumonitis/MCTD vs. volume overload (L-heart failure). Home oxygen requirement 5LNC, and patient is currently on 3lt and euvolemic, which supports last mentioned. Continue attempting to wean while in hospital.   - Needs outpt pulm and had a pending [facility] outpt   - O2 per protocol  - Monitor

## 2023-06-15 NOTE — PROGRESS NOTES
4 Eyes Skin Assessment Completed by MICHELLE Marks and MICHELLE Hollis.    Head WDL  Ears WDL  Nose WDL  Mouth WDL  Neck WDL  Breast/Chest WDL  Shoulder Blades WDL  Spine WDL  (R) Arm/Elbow/Hand Fistula  (L) Arm/Elbow/Hand Bruising  Abdomen WDL  Groin WDL  Scrotum/Coccyx/Buttocks WDL  (R) Leg WDL  (L) Leg WDL  (R) Heel/Foot/Toe WDL  (L) Heel/Foot/Toe WDL          Devices In Places Tele Box      Interventions In Place Gray Ear Foams    Possible Skin Injury No    Pictures Uploaded Into Epic N/A  Wound Consult Placed N/A  RN Wound Prevention Protocol Ordered No

## 2023-06-15 NOTE — DISCHARGE PLANNING
[facility] TCN chart review completed. Collaborated with CM prior to meeting with the pt. The most current review of medical record, knowledge of pt's PLOF and social support, LACE+ score of 67, 6 clicks scores of 15/12 ADL/mobility were considered.      Pt seen at bedside. Introduced TCN program. Provided education regarding post acute levels of care. Discussed [facility] plan benefits (Meds to Beds, medical uber and [facility] transitional care). Pt verbalizes understanding.     Pt reports that she can do some things at home on her own, and that her mom can help with everything else. She has a 4WW that she uses consistently. She is on 5L of O2 chronically, supplied by [employer]. She requested that her mother be called to relay this information as well. Call placed to mother, Chikis, who confirms that she is able to help the patient at home. Patient does not feel as though she will have any home health needs. She requested something to color or paint, called volunteer services to request items for her. No answer, message left.    No choice proactively obtained given no needs. TCN will continue to follow and collaborate with discharge planning team as additional post acute needs arise. Thank you.     Completed today:  Choice obtained: none  [facility] referral (N), pt declined

## 2023-06-16 PROBLEM — E03.8 SUBCLINICAL HYPOTHYROIDISM: Status: ACTIVE | Noted: 2023-01-01

## 2023-06-16 PROBLEM — E16.2 HYPOGLYCEMIA: Status: ACTIVE | Noted: 2023-01-01

## 2023-06-16 NOTE — PROGRESS NOTES
Patient verbalized generalized body pain 8/10, offered her pain medication prn but pt refused she is requesting to have all her medications IV route, MD and charge RN notified.

## 2023-06-16 NOTE — DIETARY
Calorie Count Results Day 1:    Lily Nicole is a 27 y.o. female with admitting DX of Anemia associated with acute blood loss    6/15/23: Breakfast 175 kcal, 5.5 g protein  6/15/23: Lunch 347 kcal, 11.8 g protein  6/15/23: Dinner 88.5 kcal, 6.7 g protein  Total x 24 hrs (Meals: 610.5 kcals and 6.7 g protein.     Pt consumed % of a snack last night. Snack was not specified.     Current PO intake is only ~40% of her estimated needs and ~13% of her estimated protein needs. PO intake is currently not adequate. Poor PO is most likely r/t presence of N/V.     Pt is now NPO for a upper GI series. Pt is agreeable to placement of G tube but not NG tube. Pt is severely malnourished and she would benefit from placement of G tube and supplemental tube feeds to increase kcals, protein, and overall nutrition.     Labs: 6/16/23: sodium=128, potassium=4.9, glucose=69, phos=3.3, mag=1.6. POC glucose: <10 to 161    Meds: thiamine, folic acid, Remeron, omeprazole, prednisone, MVI + min    Recommendations/Plan:  After diet is restarted, continue with Boost supplements BID   Initiation of tube feed if pt is agreeable to this.   Encourage intake of >50%  Document intake of all meals and supplements as % taken in ADL's to provide interdisciplinary communication across all shifts.   Record on meal ticket amount consumed of each item on meal tray. Place meal ticket in calorie count envelope posted outside of pt's room. Calorie count is from 6/15 (lunch) to 6/17 (lunch).  Daily phos and mag to follow for refeeding.   Thiamine supplementation per MD.   Monitor weight.  Nutrition rep will continue to see patient for ongoing meal and snack preferences.     RD following.

## 2023-06-16 NOTE — CARE PLAN
The patient is Stable - Low risk of patient condition declining or worsening    Shift Goals  Clinical Goals: calorie count, monitor blood sugar and hgb  Patient Goals: pain mgmt, benadryl prior to dialysis  Family Goals: FAHAD    Progress made toward(s) clinical / shift goals:   -Hgb trending up.     Patient is not progressing towards the following goals:   -Stressed importance of upper GI series, pt refusing procedure stating she is too nauseated to tolerate 200ml barium. Offered anti-emetics and anxiolytic with not enough relief to tolerate procedure. MD aware. IV benadryl q6hr PRN added.  --Blood sugar 69 at 0630, pt refused recheck before leaving for dialysis. NPO for possible procedure. Returned from dialysis, check at 1225 <10 per glucometer. Pt refused recheck, however pt alert and awake. Administered soft bolus 250ml D10 at 125ml/hr at rate pt could tolerate. Again requested recheck, pt refused but remained alert and awake. Upon completion of soft bolus, maintenance D10 initiated. At 1515 requested recheck again to which pt was agreeable, blood sugar 65. Diet resumed, however pt refused PO intake. Team aware. CTM.      Problem: Knowledge Deficit - Standard  Goal: Patient and family/care givers will demonstrate understanding of plan of care, disease process/condition, diagnostic tests and medications  Outcome: Not Progressing

## 2023-06-16 NOTE — CONSULTS
Gastroenterology Initial Consult Note               Author:  Monique Magallon M.D. Date & Time Created: 6/16/2023 1:28 PM       Patient ID:  Name:             Lily Abdi  YOB: 1995  Age:                 27 y.o.  female  MRN:               [id number]      Referring Provider:  Dr. Stevenson        Presenting Chief Complaint:  Feeding tube placeme      History of Present Illness:    This is a 27 y.o. female with uncontrolled lupus, ESRD and HTN who presented with prolonged bleeding from her AV fistula on 6/14/23.  She complained of shortness of breath and was noted to have a hemoglobin of 6.1, MCV 74, platelets 108.  She takes mycophenolate, prednisone and hydroxycholorquine for lupus.    She does not volunteer much history but states she has had chronic nausea for 1 year and that nausea is the reason she chooses not to ear.  She has been losing weight x 3 months and currently her BMI is 14.8.  She has been receiving intradialytic parenteral nutrition.  It has been recommended to her to receive nasogastric enteral feeding but she has refused.  She doesn't like the way it feels. She is not opposed to percutaneously placed feeding tubes.  She sees Dr. Kam of [facility] and states he has done some testing for chronic nausea.  Albumin and prealbumin were 2.4 and 5.0 on admission.  Ferrtin elevated to 1072, B12 652 and folate very low at 2.1 (poor nutriton, chronic dialysis    She has a history of gastrointestinal bleeding dating back to August 2022.  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  She had nonerosive duodenitis.  She had similar symptoms in 2020 and 2021.  In 2021 she had an EGD then which revealed esophagitis, nodular gastritis, Karen-Keyes tear and  "angiodysplasia of the stomach.  She has had a history of chronic abdominal pain.  She had a colonoscopy 2021 with poor prep but no bleeding.  She had a history       She has severe nausea after dialysis which leads to poor oral intake.  She has chronic constipation and takes senna and docusate at home which has been helpful per chart notes  Takes pantoprazole BID per mother.  Able to drink liquids per mother.        Review of Systems:  Review of Systems   Unable to perform ROS: Other             Past Medical History:  Past Medical History:   Diagnosis Date    Anemia 01/17/2018    AVF (arteriovenous fistula) ([facility])     Right Arm    Chest pain     Chest tightness     Daytime sleepiness     Dialysis patient ([facility])      dialysis, M,W,F Elizabeth/Joni    Difficulty breathing     ESRD (end stage renal disease) on dialysis ([facility]) 01/17/2018    Twan Fu for breath     Heart burn     Hypertension 01/17/2018    \"Controlled with medication\"    Indigestion     Lupus ([facility])     Migraines 01/17/2018    Painful breathing     Palpitations     Seizure ([facility]) 2013    from high blood pressure, reports 1 time event    Shortness of breath     Swelling of lower extremity     Wheezing      Active Hospital Problems    Diagnosis     Low serum T4 level [R79.89]     Fe deficiency anemia [D50.9]     Metabolic alkalosis [E87.3]     Disorder of electrolytes [E87.8]     Pulmonary hypertension ([facility]) [I27.20]     Systemic lupus erythematosus with organ system involvement ([facility]) [M32.10]     Severe protein-calorie malnutrition ([facility]) [E43]     Immunosuppression ([facility]) [D84.9]     Chronic respiratory failure with hypoxia ([facility]) [J96.11]     Anemia associated with acute blood loss [D62]     Pancytopenia ([facility]) [D61.818]     HTN (hypertension) [I10]     Arteriovenous fistula, acquired ([facility]) [I77.0]          Past Surgical History:  Past Surgical History:   Procedure Laterality Date    NC UPPER GI ENDOSCOPY,DIAGNOSIS N/A 8/26/2022    Procedure: " ESOPHAGOGASTRODUODENOSCOPY;  Surgeon: Parveen Wood M.D.;  Location: [facility];  Service: Gastroenterology    MO UPPER GI ENDOSCOPY,CTRL BLEED N/A 8/26/2022    Procedure: GASTROSCOPY, WITH ARGON PLASMA COAGULATION;  Surgeon: Parveen Wood M.D.;  Location: [facility];  Service: Gastroenterology    MO BRONCHOSCOPY,DIAGNOSTIC Bilateral 5/13/2021    Procedure: BRONCHOSCOPY, BRONCHOALVEOLAR LAVAGE;  Surgeon: William Spangler M.D.;  Location: [facility];  Service: Pulmonary    MO UPPER GI ENDOSCOPY,DIAGNOSIS N/A 3/19/2021    Procedure: GASTROSCOPY;  Surgeon: Waylon Mcqueen M.D.;  Location: [facility];  Service: Gastroenterology    MO UPPER GI ENDOSCOPY,CTRL BLEED  3/19/2021    Procedure: GASTROSCOPY, WITH ARGON PLASMA COAGULATION;  Surgeon: Waylon Mcqueen M.D.;  Location: [facility];  Service: Gastroenterology    MO UPPER GI ENDOSCOPY,DIAGNOSIS  3/5/2021    Procedure: GASTROSCOPY - W/HEMOSTASIS;  Surgeon: Ej Silva M.D.;  Location: [facility];  Service: Gastroenterology    MO COLONOSCOPY,DIAGNOSTIC  1/8/2021    Procedure: COLONOSCOPY;  Surgeon: Herbert Contreras M.D.;  Location: [facility];  Service: Gastroenterology    MO UPPER GI ENDOSCOPY,DIAGNOSIS  1/8/2021    Procedure: GASTROSCOPY;  Surgeon: Herbert Contreras M.D.;  Location: [facility];  Service: Gastroenterology    MO UPPER GI ENDOSCOPY,CTRL BLEED  1/8/2021    Procedure: GASTROSCOPY, WITH ARGON PLASMA COAGULATION;  Surgeon: Herbert Contreras M.D.;  Location: [facility];  Service: Gastroenterology    MO UPPER GI ENDOSCOPY,CTRL BLEED  11/12/2020    Procedure: GASTROSCOPY, WITH ARGON PLASMA COAGULATION;  Surgeon: Gadiel Whitney M.D.;  Location: [facility];  Service: Gastroenterology    MO UPPER GI ENDOSCOPY,BIOPSY  11/12/2020    Procedure: GASTROSCOPY, WITH BIOPSY;  Surgeon: Gadiel Whitney M.D.;  Location: [facility];  Service: Gastroenterology  "   GASTROSCOPY-ENDO  11/12/2020    Procedure: GASTROSCOPY;  Surgeon: Gadiel Whitney M.D.;  Location: SURGERY [facility];  Service: Gastroenterology    GASTROSCOPY-ENDO  9/18/2020    Procedure: GASTROSCOPY;  Surgeon: Gadiel Whitney M.D.;  Location: SURGERY [facility];  Service: Gastroenterology    GASTROSCOPY N/A 5/30/2020    Procedure: GASTROSCOPY;  Surgeon: Waylon Mcqueen M.D.;  Location: SURGERY [facility];  Service: Gastroenterology    GASTROSCOPY-ENDO  12/9/2019    Procedure: GASTROSCOPY;  Surgeon: Aaron Kam M.D.;  Location: SURGERY [facility];  Service: Gastroenterology    ANGIOPLASTY  01/17/2018    \"Right Arm AV-Fistulagram & Angioplastyx3\"    ULI BY LAPAROSCOPY  4/5/2010    Performed by SYED MARTELL at SURGERY [facility] ORS    AV FISTULA CREATION Right     OTHER      renal biopsy x 3    OTHER      bone marrow biopsy           Hospital Medications:  Current Facility-Administered Medications   Medication Dose Frequency Provider Last Rate Last Admin    sodium chloride 154 mEq in dextrose 10% 1,000 mL infusion   Continuous Kt Deras M.D.        diphenhydrAMINE (BENADRYL) injection 25 mg  25 mg Q6HRS PRN Raul Anthony M.D.        OLANZapine (ZYPREXA) tablet 2.5 mg  2.5 mg Q EVENING Raul Anthony M.D.        HYDROmorphone (Dilaudid) injection 0.5 mg  0.5 mg Q6HRS PRN Raul Anthony M.D.        dextrose 10 % BOLUS 25 g  25 g Q15 MIN PRN Teddy Golden M.D. 999 mL/hr at 06/16/23 1229 25 g at 06/16/23 1229    diphenhydrAMINE (BENADRYL) tablet/capsule 50 mg  50 mg Q6HRS PRN Raul Anthony M.D.   50 mg at 06/15/23 1225    NS (BOLUS) infusion 250 mL  250 mL DIALYSIS PRN Navin Metz M.D.        lidocaine (XYLOCAINE) 1 % injection 1 mL  1 mL PRN Navin Safdi, M.D.        epoetin (Retacrit) injection (Dialysis use only) 13,000 Units  13,000 Units MO, WE + FR Navin Metz M.D.   13,000 Units at 06/16/23 1042    thiamine (Vitamin B-1) tablet 100 mg  100 mg " DAILY Selam Stevenson M.D.        traZODone (DESYREL) tablet 100 mg  100 mg Once Karrie Stallworth M.D.        senna-docusate (PERICOLACE or SENOKOT S) 8.6-50 MG per tablet 2 Tablet  2 Tablet BID Raul Anthony M.D.   2 Tablet at 06/15/23 1816    And    polyethylene glycol/lytes (MIRALAX) PACKET 1 Packet  1 Packet QDAY PRN Raul Anthony M.D.        And    magnesium hydroxide (MILK OF MAGNESIA) suspension 30 mL  30 mL QDAY PRN Raul Anthony M.D.        And    bisacodyl (DULCOLAX) suppository 10 mg  10 mg QDAY PRN Raul Anthony M.D.        acetaminophen (Tylenol) tablet 650 mg  650 mg Q6HRS PRN Raul Anthony M.D.        ondansetron (ZOFRAN) syringe/vial injection 4 mg  4 mg Q4HRS PRN Raul Anthony M.D.   4 mg at 06/16/23 0751    ondansetron (ZOFRAN ODT) dispertab 4 mg  4 mg Q4HRS PRN Raul Anthony M.D.   4 mg at 06/15/23 0836    hydroxychloroquine (Plaquenil) tablet 200 mg  200 mg QACASIE Anthony M.D.   200 mg at 06/14/23 2124    predniSONE (DELTASONE) tablet 5 mg  5 mg DAILY Raul Anthony M.D.   5 mg at 06/15/23 0442    mirtazapine (Remeron) tablet 15 mg  15 mg QHS Raul Anthony M.D.   15 mg at 06/14/23 2124    omeprazole (PRILOSEC) capsule 20 mg  20 mg BID Selam Stevenson M.D.   20 mg at 06/15/23 1816    mycophenolate (CELLCEPT) capsule 500 mg  500 mg QAM Selam Stevenson M.D.   500 mg at 06/14/23 2124    folic acid (FOLVITE) tablet 1 mg  1 mg DAILY Selam Stevenson M.D.        therapeutic multivitamin-minerals (THERAGRAN-M) tablet 1 Tablet  1 Tablet DAILY Selam Stevenson M.D.       Last reviewed on 6/14/2023  2:15 PM by Shefali Lambert       Current Outpatient Medications:  Medications Prior to Admission   Medication Sig Dispense Refill Last Dose    cloNIDine (CATAPRES) 0.2 MG/24HR PATCH WEEKLY patch APPLY 1 PATCH TOPICALLY ONCE A WEEK (Patient taking differently: Place 1 Patch on the skin every 7 days.) 4 Patch 0 6[facility]7/2023 at [facility]    epoetin roque  (EPOGEN/PROCRIT) 09602 UNIT/ML Solution Inject  under the skin.   unk at unk    pantoprazole (PROTONIX) 40 MG Tablet Delayed Response Take 40 mg by mouth 2 times a day.   6/13/2023 at am    HYDROmorphone (DILAUDID) 2 MG Tab Take 2 mg by mouth every 8 hours as needed for Severe Pain.   unk at unk    mycophenolate sodium (MYFORTIC) 360 MG Tablet Delayed Response tablet Take 360 mg by mouth every morning.   6/13/2023 at am    predniSONE (DELTASONE) 5 MG Tab Take 5 mg by mouth every day.   6/13/2023 at am    hydroxychloroquine (PLAQUENIL) 200 MG Tab Take 200 mg by mouth every morning.   6/13/2023 at am         Medication Allergies:  Allergies   Allergen Reactions    Cephalexin Rash     Nausea and rash   Hives  .    Clindamycin Rash     Nausea and rash     Hive  .    Hydrocodone Rash and Nausea     Rash      Maxipime [Cefepime] Itching    Methylprednisolone Unspecified     Anxious      Tolerates prednisone     Metoprolol Rash and Nausea     Nausea and rash     Tolerates antenalol          Diagnostic X-Ray Materials Itching     Per patient's mother    Furosemide      Other reaction(s): Unknown-Explain in Comments  Unable to obtain at this time    Hydroxyzine Hcl Rash    Insulin      Other reaction(s): Other-Reaction in Comments  Patient is very sensitive to insulin administration, especially when treating hyperkalemia.  Has a hx of blood glucose 12.  Closely monitor for severe hypoglycemia that could precipitate seizures. If it's required, then give less insulin/more dextrose to prevent such hypoglycemic episodes.    9/25/2022   Verified by Dr. Kelsie Emerson (endocrinoligy)    Compazine Anxiety    Fentanyl And Related Anxiety    Metoclopramide Anxiety    Tape Rash     Paper tape is ok         Family Medical History:  Family History   Problem Relation Age of Onset    Diabetes Paternal Grandmother          Social History:  Social History     Socioeconomic History    Marital status: Single     Spouse name: Not on file     "Number of children: Not on file    Years of education: Not on file    Highest education level: Not on file   Occupational History    Not on file   Tobacco Use    Smoking status: Never    Smokeless tobacco: Never   Vaping Use    Vaping Use: Never used   Substance and Sexual Activity    Alcohol use: No    Drug use: No    Sexual activity: Not on file   Other Topics Concern    Not on file   Social History Narrative    Not on file     Social Determinants of Health     Financial Resource Strain: Low Risk  (3/8/2021)    Overall Financial Resource Strain (CARDIA)     Difficulty of Paying Living Expenses: Not hard at all   Food Insecurity: No Food Insecurity (3/8/2021)    Hunger Vital Sign     Worried About Running Out of Food in the Last Year: Never true     Ran Out of Food in the Last Year: Never true   Transportation Needs: No Transportation Needs (3/8/2021)    PRAPARE - Transportation     Lack of Transportation (Medical): No     Lack of Transportation (Non-Medical): No   Physical Activity: Not on file   Stress: Not on file   Social Connections: Not on file   Intimate Partner Violence: Not on file   Housing Stability: Not on file         Vital signs:  Weight/BMI: Body mass index is 14.86 kg/m².  BP (!) 127/96   Pulse (!) 127   Temp 36.5 °C (97.7 °F) (Axillary)   Resp 18   Ht 1.651 m (5' 5\")   Wt 40.5 kg (89 lb 4.6 oz)   SpO2 100%   Vitals:    06/16/23 0000 06/16/23 0346 06/16/23 1100 06/16/23 1229   BP: (!) 144/95 (!) 162/112 (!) 167/105 (!) 127/96   Pulse: 85 (!) 104 (!) 110 (!) 127   Resp: 19 18 (!) 22 18   Temp: 36.4 °C (97.5 °F) 36.4 °C (97.5 °F) 36.4 °C (97.5 °F) 36.5 °C (97.7 °F)   TempSrc: Temporal Temporal Temporal Axillary   SpO2: 94% 92% 100% 100%   Weight:       Height:         Oxygen Therapy:  Pulse Oximetry: 100 %, O2 (LPM): 5, O2 Delivery Device: Silicone Nasal Cannula    Intake/Output Summary (Last 24 hours) at 6/16/2023 1328  Last data filed at 6/16/2023 1110  Gross per 24 hour   Intake 500 ml "   Output 1639 ml   Net -1139 ml         Physical Exam:  Physical Exam            Labs:  Recent Labs     06/14/23  1330 06/14/23  2008 06/15/23  0335 06/16/23  0626   SODIUM 134*  --  130* 128*   POTASSIUM 3.8  --  4.1 4.9   CHLORIDE 91*  --  90* 89*   CO2 38*  --  32 30   BUN 17  --  21 29*   CREATININE 1.59*  --  2.21* 3.31*   MAGNESIUM 1.5 1.6  --  1.6   PHOSPHORUS 2.0* 2.6  --  3.3   CALCIUM 7.9*  --  8.3* 8.6     Recent Labs     06/14/23  1330 06/14/23  1530 06/15/23  0335 06/16/23  0626   ALTSGPT 7  --  5 <5   ASTSGOT 11*  --  12 15   ALKPHOSPHAT 81  --  82 81   TBILIRUBIN 0.6  --  0.9 0.7   PREALBUMIN  --  5.0*  --   --    GLUCOSE 82  --  61* 69     Recent Labs     06/14/23  1330 06/15/23  0335 06/16/23  0626 06/16/23  0915   WBC 3.2* 3.2*  --  3.9*   NEUTSPOLYS 93.00* 75.90*  --  74.70*   LYMPHOCYTES 3.50* 16.30*  --  19.00*   MONOCYTES 2.60 6.30  --  4.70   EOSINOPHILS 0.00 0.60  --  0.50   BASOPHILS 0.90 0.60  --  0.80   ASTSGOT 11* 12 15  --    ALTSGPT 7 5 <5  --    ALKPHOSPHAT 81 82 81  --    TBILIRUBIN 0.6 0.9 0.7  --      Recent Labs     06/14/23 1330 06/14/23  1530 06/14/23  1715 06/14/23  2008 06/15/23  0335 06/16/23  0915   RBC 2.81*  --   --   --  3.89* 4.09*   HEMOGLOBIN 6.0*  --  8.1*  --  9.1* 9.6*   HEMATOCRIT 20.8*  --  26.1*  --  29.5* 30.7*   PLATELETCT 108*  --   --   --  123* 190   PROTHROMBTM 17.5*  --   --   --   --   --    INR 1.47*  --   --   --   --   --    IRON  --   --   --  59  --   --    FERRITIN  --  1072.0*  --   --   --   --    TOTIRONBC  --   --   --  see below  --   --      Recent Results (from the past 24 hour(s))   Comp Metabolic Panel    Collection Time: 06/16/23  6:26 AM   Result Value Ref Range    Sodium 128 (L) 135 - 145 mmol/L    Potassium 4.9 3.6 - 5.5 mmol/L    Chloride 89 (L) 96 - 112 mmol/L    Co2 30 20 - 33 mmol/L    Anion Gap 9.0 7.0 - 16.0    Glucose 69 65 - 99 mg/dL    Bun 29 (H) 8 - 22 mg/dL    Creatinine 3.31 (H) 0.50 - 1.40 mg/dL    Calcium 8.6 8.5 - 10.5  mg/dL    AST(SGOT) 15 12 - 45 U/L    ALT(SGPT) <5 2 - 50 U/L    Alkaline Phosphatase 81 30 - 99 U/L    Total Bilirubin 0.7 0.1 - 1.5 mg/dL    Albumin 2.1 (L) 3.2 - 4.9 g/dL    Total Protein 8.6 (H) 6.0 - 8.2 g/dL    Globulin 6.5 (H) 1.9 - 3.5 g/dL    A-G Ratio 0.3 g/dL   MAGNESIUM    Collection Time: 06/16/23  6:26 AM   Result Value Ref Range    Magnesium 1.6 1.5 - 2.5 mg/dL   PHOSPHORUS    Collection Time: 06/16/23  6:26 AM   Result Value Ref Range    Phosphorus 3.3 2.5 - 4.5 mg/dL   CORRECTED CALCIUM    Collection Time: 06/16/23  6:26 AM   Result Value Ref Range    Correct Calcium 10.1 8.5 - 10.5 mg/dL   ESTIMATED GFR    Collection Time: 06/16/23  6:26 AM   Result Value Ref Range    GFR (CKD-EPI) 19 (A) >60 mL/min/1.73 m 2   CBC WITH DIFFERENTIAL    Collection Time: 06/16/23  9:15 AM   Result Value Ref Range    WBC 3.9 (L) 4.8 - 10.8 K/uL    RBC 4.09 (L) 4.20 - 5.40 M/uL    Hemoglobin 9.6 (L) 12.0 - 16.0 g/dL    Hematocrit 30.7 (L) 37.0 - 47.0 %    MCV 75.1 (L) 81.4 - 97.8 fL    MCH 23.5 (L) 27.0 - 33.0 pg    MCHC 31.3 (L) 32.2 - 35.5 g/dL    RDW 60.9 (H) 35.9 - 50.0 fL    Platelet Count 190 164 - 446 K/uL    Neutrophils-Polys 74.70 (H) 44.00 - 72.00 %    Lymphocytes 19.00 (L) 22.00 - 41.00 %    Monocytes 4.70 0.00 - 13.40 %    Eosinophils 0.50 0.00 - 6.90 %    Basophils 0.80 0.00 - 1.80 %    Immature Granulocytes 0.30 0.00 - 0.90 %    Nucleated RBC 0.00 0.00 - 0.20 /100 WBC    Neutrophils (Absolute) 2.88 1.82 - 7.42 K/uL    Lymphs (Absolute) 0.73 (L) 1.00 - 4.80 K/uL    Monos (Absolute) 0.18 0.00 - 0.85 K/uL    Eos (Absolute) 0.02 0.00 - 0.51 K/uL    Baso (Absolute) 0.03 0.00 - 0.12 K/uL    Immature Granulocytes (abs) 0.01 0.00 - 0.11 K/uL    NRBC (Absolute) 0.00 K/uL   POCT glucose device results    Collection Time: 06/16/23 12:25 PM   Result Value Ref Range    POC Glucose, Blood <10 (LL) 65 - 99 mg/dL         Radiology Review:  DX-UPPER GI-SERIES WITH KUB    (Results Pending)         MDM (Data Review):    -Records reviewed and summarized in current documentation  -I personally reviewed and interpreted the laboratory results  -I personally reviewed the radiology images    Assessment/Recommendations:  Impression:     Severe protein calorie malnutrtion   Chronic nausea   SLE   ESRD on dialysis   Pancytopenia   Acute blood loss anemia   Chronic anemia, microcytic   Gastric antral vascular ectasia, history of   Coagulopathy   Chronic respiratory failure with hypoxia    Recs:   Spoke with physician on call at [facility] and will see if I can get information re: workup of nausea and if she has had recent EGD for GAVE and if she has had ablation of GAVE   Spoke with mother regarding her refusal of tube and what we can offer.  Spoke with Dr. Stevenson and we both agree on Psych consult.  I wonder if Bioethics consult might be in order but will wait to see what Psych says.  Also discussed hospice if she refuses to eat and refuses temporary nasogastic tube.  She is intolerant/allergic to metoclopramide and compazine.  Will try Marinol for nausea and appetite stimulant.  Any chance she could have chronic adrenal insufficiency contributing to chronic nausea and would need higher dose of corticosteroid?  I defer to medicine service.    Addendum:  She has not seen Dr. Kam in the office since 2021.    Time spent speaking with mother and physicians in addition to reviewing chart was 100 minutes.          Monique Magallon M.D.          Core Quality Measures   Reviewed items:  Labs, Medications and Radiology reports reviewed

## 2023-06-16 NOTE — PROGRESS NOTES
[facility] Internal Medicine Daily Progress Note    Date of Service  6/16/2023    [facility] Team: R IM Orange Team   Attending: Selam Stevenson M.d.  Senior Resident: Dr. Kt Deras  Intern:  Dr. Raul Anthony  Contact Number: [phone]    Chief Complaint  Lily Nicole is a 27 y.o. female admitted 6/14/2023 with Acute blood loss from right arm AV fistula    Hospital Course  Lily Nicole is a 27 y.o. female with significant medical history of SLE glomerulonephritis syndrome with  ESRD on hemodialysis (on plaquenil, mycophenolate, prednisone), malnutrition, seizure associated with hypoglycemia, HTN, constipation with chronic opioid use, migraines, pulmonary hypertension on 5LNC who presented 6/14/2023 with acute blood loss anemia from her AV fistula (placed 11-12 yrs ago) while at the dialysis center with hemoglobin 6.1. Patient transfused 1 uPRBC irradiated with dialysis and benadryl 50mg IV due to history of shortness of breath associated with transfusions. Hemoglobin stabilized at 9.1.   Her severe malnutrition status has been discussed and further workup for nausea leading to decreased appetite is pending with upper GI study. GI and General Surgery have been called regarding placing a G tube or PEG, at this time recs made for further studies and possible NG tube placement given her current condition she has a high risk of bleeding and difficulty healing.   Patient is also having hypoglycemic episodes associated with low appetite.    Interval Problem Update  Overnight patient did request IV Ativan for anxiety, was given trazodone by 19.  Request also for Dilaudid IV for pain control, Dilaudid p.o. 60 changed to every 6 hours.  This a.m. patient was seen at bedside and in dialysis, stated she has very fatigue as she could not sleep last night due to anxiety and pain.  Patient states she cannot swallow barium, and did vomit at dialysis.  Zofran is not alleviating nausea.  IV Benadryl has been  given for anxiety.  -Upper GI series ordered, able to complete as patient cannot tolerate swallowing of barium. GI consulted for formal evaluation  -Hypoglycemic protocol, patient now agreeable to glucose checks during rounds BGL <10 given D10 bolus. Started on D10+NS continuous  -Benadryl IV 25mg  prn for anxiety, dilaudid IV prn for pain    I have discussed this patient's plan of care and discharge plan at IDT rounds today with Case Management, Nursing, Nursing leadership, and other members of the IDT team.    Consultants/Specialty  No formal consults - case was discussed with General Surgery and GI    Code Status  Full Code    Disposition  The patient is not medically cleared for discharge to home or a post-acute facility.      I have placed the appropriate orders for post-discharge needs.    Review of Systems  Review of Systems   Constitutional:  Positive for malaise/fatigue and weight loss. Negative for chills and fever.   Respiratory:  Negative for cough and shortness of breath.    Cardiovascular:  Negative for chest pain and palpitations.   Gastrointestinal:  Positive for abdominal pain, nausea and vomiting. Negative for constipation and diarrhea.   Genitourinary:         Anuric   Musculoskeletal:  Positive for back pain, joint pain, myalgias and neck pain.   Neurological:  Positive for weakness. Negative for dizziness, focal weakness and headaches.        Physical Exam  Temp:  [36.4 °C (97.5 °F)-36.6 °C (97.9 °F)] 36.5 °C (97.7 °F)  Pulse:  [] 127  Resp:  [16-22] 18  BP: (127-171)/() 127/96  SpO2:  [92 %-100 %] 100 %    Physical Exam  Constitutional:       General: She is not in acute distress.     Appearance: She is ill-appearing. She is not toxic-appearing or diaphoretic.      Comments: Cachectic with temporal wasting, appears more fatigued today   HENT:      Head: Normocephalic and atraumatic.      Right Ear: External ear normal.      Left Ear: External ear normal.      Nose: Nose normal.       Mouth/Throat:      Mouth: Mucous membranes are dry.   Eyes:      General: No scleral icterus.        Right eye: No discharge.         Left eye: No discharge.      Extraocular Movements: Extraocular movements intact.      Conjunctiva/sclera: Conjunctivae normal.   Cardiovascular:      Rate and Rhythm: Normal rate and regular rhythm.      Pulses: Normal pulses.      Heart sounds: Normal heart sounds. No murmur heard.  Pulmonary:      Effort: Pulmonary effort is normal. No respiratory distress.      Breath sounds: Normal breath sounds.   Abdominal:      General: Abdomen is flat. Bowel sounds are normal. There is no distension.      Palpations: Abdomen is soft.      Tenderness: There is no abdominal tenderness.   Musculoskeletal:         General: Normal range of motion.      Cervical back: Normal range of motion.      Right lower leg: No edema.      Left lower leg: No edema.   Skin:     General: Skin is warm and dry.      Comments: Skin indicative of scleroderma of hands and face   Neurological:      General: No focal deficit present.      Mental Status: She is alert and oriented to person, place, and time.      Motor: Weakness present.   Psychiatric:         Mood and Affect: Mood normal.         Behavior: Behavior normal.         Thought Content: Thought content normal.         Judgment: Judgment normal.         Fluids    Intake/Output Summary (Last 24 hours) at 6/16/2023 1447  Last data filed at 6/16/2023 1110  Gross per 24 hour   Intake 500 ml   Output 1639 ml   Net -1139 ml       Laboratory  Recent Labs     06/14/23  1330 06/14/23  1715 06/15/23  0335 06/16/23  0915   WBC 3.2*  --  3.2* 3.9*   RBC 2.81*  --  3.89* 4.09*   HEMOGLOBIN 6.0* 8.1* 9.1* 9.6*   HEMATOCRIT 20.8* 26.1* 29.5* 30.7*   MCV 74.0*  --  75.8* 75.1*   MCH 21.4*  --  23.4* 23.5*   MCHC 28.8*  --  30.8* 31.3*   RDW 57.3*  --  58.2* 60.9*   PLATELETCT 108*  --  123* 190     Recent Labs     06/14/23  1330 06/15/23  0335 06/16/23  0626   SODIUM 134*  130* 128*   POTASSIUM 3.8 4.1 4.9   CHLORIDE 91* 90* 89*   CO2 38* 32 30   GLUCOSE 82 61* 69   BUN 17 21 29*   CREATININE 1.59* 2.21* 3.31*   CALCIUM 7.9* 8.3* 8.6     Recent Labs     06/14/23  1330   INR 1.47*               Imaging  DX-UPPER GI-SERIES WITH KUB    (Results Pending)        Assessment/Plan  Problem Representation:    * Anemia associated with acute blood loss- (present on admission)  Assessment & Plan  Presents to the ED with hemoglobin 6.1, blood loss from AV fistula site at dialysis center.  Per patient she had blood loss at dialysis last Friday for which she came to the ED and required a stitch to be placed.  Also had blood loss from AV fistula site at dialysis on Monday.  History of having transfusion reactions requiring Benadryl and getting a blood transfusion with dialysis.  Anemia likely due to acute blood loss from AV fistula, coagulopathy labs show elevated INR 1.34 with TEG findings largely normal with no indication for FFP or cryo.  -Nephrology consulted, appreciate recs: plan to continue dialysis routine MWF  -Benadryl 50 mg IV for dialysis  -Dialysis using AV fistula without further bleeding  -qam H&H     Severe protein-calorie malnutrition (HCC)- (present on admission)  Assessment & Plan  Patient does not want NG tube but has changed her mind regarding feeding tube. High surgical candidate therefore further studies for cause of nausea pending.  -Nutrition consult  -Vitamins, zinc, copper, iron labs pending  -prealbumin 5  -Boost with meals  -Calorie count  -Upper Gi series pending, unable to be conducted patient unable to swallow barium  -Gi consulted, appreciate recs    Arteriovenous fistula, acquired (HCC)- (present on admission)  Assessment & Plan  Coagulation studies show slight elevation INR 1.47, TEG no indication for FFT/cryo.  Bleeding has resolved  -may need re-evaluation by vascular surgeon   -Dialysis today, AV fistula without bleeding    Fe deficiency  anemia  Assessment & Plan  Fe panel with Fe 59, TIB and Fe% uncalculable, unsat Fe <17 likely due to ESRD and malnutrition  -epoetin with dialysis      Low serum T4 level  Assessment & Plan  T4 0.9 at lower end of range, TSH 9.130. Likely related to malnutrition rather than indicative of hypothyroidism.  Holding off on synthroid as given patient's malnutrition will not want to increase metabolism.     Disorder of electrolytes  Assessment & Plan  Post dialysis session today  -monitor and replete as needed K>4, PO4>3, Mg>2    Metabolic alkalosis  Assessment & Plan  Resolved  Potential causes of nausea, patient does not have hyperkalemia or diarrhea etiology potentially associated with ESRD      Systemic lupus erythematosus with organ system involvement (HCC)- (present on admission)  Assessment & Plan  - Continue home Plaquenil, mycophenolate, prednisone    Hyponatremia- (present on admission)  Assessment & Plan  Sodium 128 moderate hyponatremia, historically has mild to moderate with  lowest 120.  Etiology likely considerable due to malnutrition and ESRD  -Will receive D10 and NS  -Continue to monitor    Chronic respiratory failure with hypoxia (HCC)- (present on admission)  Assessment & Plan  Home oxygen requirement 5LNC, no increased requirement on admission. Hx of pulmonary HTN  -monitor oxygen requirement    Pancytopenia (HCC)- (present on admission)  Assessment & Plan  Chronic, potential relation to SLE and medications  -continue to monitor   -coagulation labs sig for elevated INR 1.47    HTN (hypertension)- (present on admission)  Assessment & Plan  We will monitor blood pressure, patient uses clonidine patch/p.o. and amlodipine 10 mg as needed  -Continue home atenolol 25 mg         VTE prophylaxis: SCDs/TEDs and pharmacologic prophylaxis contraindicated due to bleeding risk    I have performed a physical exam and reviewed and updated ROS and Plan today (6/16/2023). In review of yesterday's note (6/15/2023),  there are no changes except as documented above.

## 2023-06-16 NOTE — PROGRESS NOTES
[facility] Service Progress Note:    Hemodialysis treatment ordered today per Dr Metz x 3 hours. Treatment initiated at 0936, ended at 1236.   Pt drowsy pre-tx, arouses easily to voice and touch, VSS.     Patient anxious, repositioned for comfort, hypertensive at times, Pt vomited during HD with c/o severe abdominal cramp, requested to terminate tx 30 min early, Dr Metz notified at bedside.; see e-flow sheet for details.     Net UF 1,139 mL.     Needles removed from access site. Dressings applied and sites held x 10 minutes; verified no bleeding. Positive bruit/thrill post tx. Staff RN to monitor AVF for breakthrough bleeding. Should breakthrough bleeding occur, staff RN to apply pressure to access sites until bleeding resolved. Notify Dialysis and Nephrologist for follow-up.    Report given to Primary RN.

## 2023-06-16 NOTE — PROGRESS NOTES
"Pt refused all her evening medications including her p.o. Pain medication, Per pt she is is allergic to Trazadone, and requesting that all her medications will be given thru IV route, due to stomach upset. Pain level 8/10 RN offered pain med p.o. PRN but refused it as well. Pt stated that she should have Ativan IV for anxiety, MD ordered Trazadone once instead, offered the Benadryl p.o for her anxiety but she wanted it through IV and stated \"If I cannot get my meds.through IV,I just wanted to go home.\" MD and charge RN is made aware of the current situation.  "

## 2023-06-16 NOTE — HOSPITAL COURSE
Lily Nicole is a 27 y.o. female with significant medical history of SLE glomerulonephritis syndrome with  ESRD on hemodialysis (on plaquenil, mycophenolate, prednisone), malnutrition, seizure associated with hypoglycemia, HTN, constipation with chronic opioid use, migraines, pulmonary hypertension on 5LNC who presented 6/14/2023 with acute blood loss anemia from her AV fistula (placed 11-12 yrs ago) while at the dialysis center with hemoglobin 6.1. Patient transfused 1 uPRBC irradiated with dialysis and benadryl 50mg IV due to history of shortness of breath associated with transfusions. Hemoglobin stabilized at 9.1.   Her severe malnutrition status has been discussed. GI and General Surgery have been called regarding placing a G tube or PEG, at this time recs made for further studies and possible NG tube placement given her current condition she has a high risk of bleeding and difficulty healing.  Patient could not undergo upper GI series as could not swallow barium.    Patient is also having hypoglycemic episodes associated with low appetite.  Dobbhoff was placed and was removed as patient could not tolerate.    Palliative care recs for referral to outpatient/home pallaitive care team on discharge.     6/19 bleeding from AV fistula, Hgb holding 11.8. Vascular surgery consulted and patient underwent right fistulogram for treatment of central venous stenosis with venoplasty. Follow up with Dr. Dotson outpatient in 3-6 months to evaluate for central venous stenosis recurrence.     Patient continues to have difficulty consuming oral intake due to nausea.  Patient is not want placement of NG tube.      Patient was spiking fevers and found to have bacteremia with E. coli.  Continue patient on with Zosyn.  CT thorax abdomen pelvis without evidence of abscess or abdominal infection.  Chest does show bilateral pulmonary infiltration concerning for aspiration pneumonitis.  Discussion with patient's mother regarding  TPN and risk of infection.  As patient continues to refuse NG tube placement, TPN may need to be started pending negative blood cultures for placement of a line.

## 2023-06-16 NOTE — ASSESSMENT & PLAN NOTE
Improving to 132, hypovolemic on admission, likely improving with enteral nutrition.   -Continue to monitor

## 2023-06-16 NOTE — DISCHARGE PLANNING
[facility] chart review completed. Current discharge considerations are home without family support and outpatient followup. Patient's needs are more medical in nature. Per nephrologist, patient with ESRD, will have HD 3 days/wk. Per Kardex, patient has been pivoting to chair and/or using BSC only. Would note that during assessment, patient stated that she has not mobilized because she has felt extremely fatigued. She did mention that her mother assists with all of her needs. Reached out to RN to see if she felt that a consult for OT and PT would be appropriate, no response received. TCN will continue to follow and collaborate with discharge planning team as additional post acute needs arise. Thank you.    Completed:  Choice obtained: none  [facility] referral (N), pt declined    [id number] addendum: Per RN, the provider for this patient is Raul Anthony M.D. Reached out via voalte to request PT and OT consult.

## 2023-06-16 NOTE — ASSESSMENT & PLAN NOTE
Fe panel with Fe 59, TIB and Fe% uncalculable, unsat Fe <17 likely due to ESRD and malnutrition  -epoetin with dialysis

## 2023-06-16 NOTE — CARE PLAN
The patient is Stable - Low risk of patient condition declining or worsening    Shift Goals  Clinical Goals: Calorie count, normal Blood glucose  Patient Goals: Pain management  Family Goals: n/a    Progress made toward(s) clinical / shift goal:   Problem: Pain - Standard  Goal: Alleviation of pain or a reduction in pain to the patient’s comfort goal  Outcome: Progressing     Problem: Risk for Bleeding  Goal: Patient will take measures to prevent bleeding and recognizes signs of bleeding that need to be reported immediately to a health care professional  Outcome: Progressing  Note: AV fistula is clean and dry no signs of bleeding.       Patient is not progressing towards the following goals:Pain level is the same

## 2023-06-16 NOTE — PROGRESS NOTES
[facility] Internal Medicine Daily Progress Note    Date of Service  6/16/2023    [facility] Team: R IM Orange Team   Attending: Selam Stevenson M.d.  Senior Resident: Dr. Kt Deras  Intern:  Dr. Raul Anthony  Contact Number: [phone]    Chief Complaint  Lily Nicole is a 27 y.o. female admitted 6/14/2023 with Acute blood loss from right arm AV fistula    Hospital Course  Lily Nicole is a 27 y.o. female with significant medical history of SLE glomerulonephritis syndrome with  ESRD on hemodialysis (on plaquenil, mycophenolate, prednisone), malnutrition, seizure associated with hypoglycemia, HTN, constipation with chronic opioid use, migraines, pulmonary hypertension on 5LNC who presented 6/14/2023 with acute blood loss anemia from her AV fistula (placed 11-12 yrs ago) while at the dialysis center with hemoglobin 6.1. Patient transfused 1 uPRBC irradiated with dialysis and benadryl 50mg IV due to history of shortness of breath associated with transfusions. Hemoglobin stabilized at 9.1.   Her severe malnutrition status has been discussed and further workup for nausea leading to decreased appetite is pending with upper GI study. GI and General Surgery have been called regarding placing a G tube or PEG, at this time recs made for further studies and possible NG tube placement given her current condition she has a high risk of bleeding and difficulty healing.   Patient is also having hypoglycemic episodes associated with low appetite.    Interval Problem Update  This am patient was hypoglycemic at 60, orange juice as attempted to be given but she was nauseous. She was given 0.5mg IV ativan for anxiety, D50, and her daily prednisone with BGL rising to 124.  Per patient, she feels very fatigued and does not want to eat. States that food makes her nauseous as well and zofran has not helped but she cannot take compazine, scopolamine, or metoclopramide due to anxiety.   Discussion today with patient  and her mother, patient decided to pursue G tube and still resistant to NG tube. Per consultation with General Surgery and GI, further workup requested for gastric outlet or gastroparesis as patient high risk surgical patient.   -Upper GI series ordered  -Hypoglycemic protocol    I have discussed this patient's plan of care and discharge plan at IDT rounds today with Case Management, Nursing, Nursing leadership, and other members of the IDT team.    Consultants/Specialty  No formal consults - case was discussed with General Surgery and GI    Code Status  Full Code    Disposition  The patient is not medically cleared for discharge to home or a post-acute facility.      I have placed the appropriate orders for post-discharge needs.    Review of Systems  Review of Systems   Constitutional:  Positive for malaise/fatigue and weight loss. Negative for chills and fever.   Respiratory:  Negative for cough and shortness of breath.    Cardiovascular:  Negative for chest pain and palpitations.   Gastrointestinal:  Positive for nausea. Negative for abdominal pain, constipation, diarrhea and vomiting.   Genitourinary:         Anuric   Musculoskeletal:  Positive for back pain, joint pain, myalgias and neck pain.   Neurological:  Positive for weakness. Negative for dizziness, focal weakness and headaches.        Physical Exam  Temp:  [36.4 °C (97.5 °F)-36.6 °C (97.9 °F)] 36.4 °C (97.5 °F)  Pulse:  [] 104  Resp:  [16-19] 18  BP: (128-171)/() 162/112  SpO2:  [91 %-98 %] 92 %    Physical Exam  Constitutional:       General: She is not in acute distress.     Appearance: She is ill-appearing. She is not toxic-appearing or diaphoretic.      Comments: Cachectic with temporal wasting,    HENT:      Head: Normocephalic and atraumatic.      Right Ear: External ear normal.      Left Ear: External ear normal.      Nose: Nose normal.      Mouth/Throat:      Mouth: Mucous membranes are moist.   Eyes:      General: No scleral  icterus.        Right eye: No discharge.         Left eye: No discharge.      Extraocular Movements: Extraocular movements intact.      Conjunctiva/sclera: Conjunctivae normal.   Cardiovascular:      Rate and Rhythm: Normal rate and regular rhythm.      Pulses: Normal pulses.      Heart sounds: Normal heart sounds. No murmur heard.  Pulmonary:      Effort: Pulmonary effort is normal. No respiratory distress.      Breath sounds: Normal breath sounds.   Abdominal:      General: Abdomen is flat. Bowel sounds are normal. There is no distension.      Palpations: Abdomen is soft.      Tenderness: There is no abdominal tenderness.   Musculoskeletal:         General: Normal range of motion.      Cervical back: Normal range of motion.      Right lower leg: No edema.      Left lower leg: No edema.   Skin:     General: Skin is warm and dry.      Comments: Skin indicative of scleroderma of hands and face   Neurological:      General: No focal deficit present.      Mental Status: She is alert and oriented to person, place, and time.      Motor: Weakness present.   Psychiatric:         Mood and Affect: Mood normal.         Behavior: Behavior normal.         Thought Content: Thought content normal.         Judgment: Judgment normal.         Fluids    Intake/Output Summary (Last 24 hours) at 6/16/2023 0609  Last data filed at 6/15/2023 1000  Gross per 24 hour   Intake 100 ml   Output 0 ml   Net 100 ml         Laboratory  Recent Labs     06/14/23  1330 06/14/23  1715 06/15/23  0335   WBC 3.2*  --  3.2*   RBC 2.81*  --  3.89*   HEMOGLOBIN 6.0* 8.1* 9.1*   HEMATOCRIT 20.8* 26.1* 29.5*   MCV 74.0*  --  75.8*   MCH 21.4*  --  23.4*   MCHC 28.8*  --  30.8*   RDW 57.3*  --  58.2*   PLATELETCT 108*  --  123*       Recent Labs     06/14/23  1330 06/15/23  0335   SODIUM 134* 130*   POTASSIUM 3.8 4.1   CHLORIDE 91* 90*   CO2 38* 32   GLUCOSE 82 61*   BUN 17 21   CREATININE 1.59* 2.21*   CALCIUM 7.9* 8.3*       Recent Labs     06/14/23  1330    INR 1.47*                 Imaging  DX-UPPER GI-SMALL BOWEL FOLLOW THRU    (Results Pending)          Assessment/Plan  Problem Representation:    * Anemia associated with acute blood loss- (present on admission)  Assessment & Plan  Presents to the ED with hemoglobin 6.1, blood loss from AV fistula site at dialysis center.  Per patient she had blood loss at dialysis last Friday for which she came to the ED and required a stitch to be placed.  Also had blood loss from AV fistula site at dialysis on Monday.  History of having transfusion reactions requiring Benadryl and getting a blood transfusion with dialysis.  Anemia likely due to acute blood loss from AV fistula, coagulopathy labs show elevated INR 1.34 with TEG findings largely normal with no indication for FFP or cryo.  -Nephrology consulted, appreciate recs: plan to continue dialysis routine MWF  -Benadryl 50 mg IV for dialysis  -Pending dialysis and AV fistula ability to not bleed, may need to consult vascular surgery  -qam H&H     Fe deficiency anemia  Assessment & Plan  Fe panel with Fe 59, TIB and Fe% uncalculable, unsat Fe <17 likely due to ESRD and malnutrition  -epoetin with dialysis      Low serum T4 level  Assessment & Plan  T4 0.9 at lower end of range, TSH 9.130. Likely related to malnutrition rather than indicative of hypothyroidism.  Holding off on synthroid as given patient's malnutrition will not want to increase metabolism.     Disorder of electrolytes  Assessment & Plan  Post dialysis session today  -monitor and replete as needed K>4, PO4>3, Mg>2    Metabolic alkalosis  Assessment & Plan  Potential causes of nausea, patient does not have hyperkalemia or diarrhea etiology potentially associated with ESRD  -Monitor    Systemic lupus erythematosus with organ system involvement (HCC)- (present on admission)  Assessment & Plan  - Continue home Plaquenil, mycophenolate, prednisone    Severe protein-calorie malnutrition (HCC)- (present on  admission)  Assessment & Plan  Patient does not want NG tube but has changed her mind regarding feeding tube. High surgical candidate therefore further studies for cause of nausea pending.  -Nutrition consult  -Vitamins, zinc, copper, iron labs pending  -prealbumin 5  -Boost with meals  -Calorie count  -Upper Gi series pending    Chronic respiratory failure with hypoxia (HCC)- (present on admission)  Assessment & Plan  Home oxygen requirement 5LNC, no increased requirement on admission. Hx of pulmonary HTN  -monitor oxygen requirement    Pancytopenia (HCC)- (present on admission)  Assessment & Plan  Chronic, potential relation to SLE and medications  -continue to monitor   -coagulation labs sig for elevated INR 1.47    HTN (hypertension)- (present on admission)  Assessment & Plan  We will monitor blood pressure, patient uses clonidine patch/p.o. and amlodipine 10 mg as needed  -Continue home atenolol 25 mg    Arteriovenous fistula, acquired (HCC)- (present on admission)  Assessment & Plan  Coagulation studies show slight elevation INR 1.47, TEG no indication for FFT/cryo.  Bleeding has resolved  -may need re-evaluation by vascular surgeon   -plan for usage at dialysis tomorrow         VTE prophylaxis: SCDs/TEDs and pharmacologic prophylaxis contraindicated due to bleeding risk    I have performed a physical exam and reviewed and updated ROS and Plan today (6/16/2023). In review of yesterday's note (6/15/2023), there are no changes except as documented above.

## 2023-06-16 NOTE — PROCEDURES
Diagnosis: End-Stage Renal Disease originally admitted with anemia and bleeding from AV fistula site, found to have severe protein calorie malnutrition and failure to thrive. Patient seen and examined on hemodialysis during treatment. Patient is stable, tolerating hemodialysis. Denies chest pain and shortness of breath. Orders updated as needed. Please refer to flowsheet for full details.    Access: Right upper arm AV fistula  UF goal: 1 to 1.5 L as tolerated    Plan: Continue hemodialysis on a Monday Wednesday Friday schedule.  Patient has been refusing surgical or interventional gastric tube placement for supplemental nutrition.    OK to discharge from Nephrology standpoint.   There is no need for outpatient nephrology clinic follow up appointment, as the patient will be seen by a nephrologist at their outpatient dialysis center.     Navin Metz MD  Nephrology   [facility]

## 2023-06-16 NOTE — PROGRESS NOTES
[facility] Internal Medicine Daily Progress Note    Date of Service  6/15/2023    [facility] Team: R IM Orange Team   Attending: Selam Stevenson M.d.  Senior Resident: Dr. Kt Deras  Intern:  Dr. Raul Anthony  Contact Number: [phone]    Chief Complaint  Lily Nicole is a 27 y.o. female admitted 6/14/2023 with Acute blood loss from right arm AV fistula    Hospital Course  Lily Nicole is a 27 y.o. female with significant medical history of SLE glomerulonephritis syndrome with  ESRD on hemodialysis (on plaquenil, mycophenolate, prednisone), malnutrition, seizure associated with hypoglycemia, HTN, constipation with chronic opioid use, migraines, pulmonary hypertension on 5LNC who presented 6/14/2023 with acute blood loss anemia from her AV fistula (placed 11-12 yrs ago) while at the dialysis center with hemoglobin 6.1. Patient transfused 1 uPRBC irradiated with dialysis and benadryl 50mg IV due to history of shortness of breath associated with transfusions. Hemoglobin stabilized at 9.1.   Her severe malnutrition status has been discussed and further workup for nausea leading to decreased appetite is pending with upper GI study. GI and General Surgery have been called regarding placing a G tube or PEG, at this time recs made for further studies and possible NG tube placement given her current condition she has a high risk of bleeding and difficulty healing.   Patient is also having hypoglycemic episodes associated with low appetite.    Interval Problem Update  This am patient was hypoglycemic at 60, orange juice as attempted to be given but she was nauseous. She was given 0.5mg IV ativan for anxiety, D50, and her daily prednisone with BGL rising to 124.  Per patient, she feels very fatigued and does not want to eat. States that food makes her nauseous as well and zofran has not helped but she cannot take compazine, scopolamine, or metoclopramide due to anxiety.   Discussion today with patient  and her mother, patient decided to pursue G tube and still resistant to NG tube. Per consultation with General Surgery and GI, further workup requested for gastric outlet or gastroparesis as patient high risk surgical patient.   -Upper GI series ordered  -Hypoglycemic protocol    I have discussed this patient's plan of care and discharge plan at IDT rounds today with Case Management, Nursing, Nursing leadership, and other members of the IDT team.    Consultants/Specialty  No formal consults - case was discussed with General Surgery and GI    Code Status  Full Code    Disposition  The patient is not medically cleared for discharge to home or a post-acute facility.      I have placed the appropriate orders for post-discharge needs.    Review of Systems  Review of Systems   Constitutional:  Positive for malaise/fatigue and weight loss. Negative for chills and fever.   Respiratory:  Negative for cough and shortness of breath.    Cardiovascular:  Negative for chest pain and palpitations.   Gastrointestinal:  Positive for nausea. Negative for abdominal pain, constipation, diarrhea and vomiting.   Genitourinary:         Anuric   Musculoskeletal:  Positive for back pain, joint pain, myalgias and neck pain.   Neurological:  Positive for weakness. Negative for dizziness, focal weakness and headaches.        Physical Exam  Temp:  [36.2 °C (97.1 °F)-36.6 °C (97.9 °F)] 36.6 °C (97.9 °F)  Pulse:  [61-90] 80  Resp:  [16-17] 16  BP: (112-150)/(80-97) 132/92  SpO2:  [90 %-98 %] 92 %    Physical Exam  Constitutional:       General: She is not in acute distress.     Appearance: She is ill-appearing. She is not toxic-appearing or diaphoretic.      Comments: Cachectic with temporal wasting,    HENT:      Head: Normocephalic and atraumatic.      Right Ear: External ear normal.      Left Ear: External ear normal.      Nose: Nose normal.      Mouth/Throat:      Mouth: Mucous membranes are moist.   Eyes:      General: No scleral icterus.         Right eye: No discharge.         Left eye: No discharge.      Extraocular Movements: Extraocular movements intact.      Conjunctiva/sclera: Conjunctivae normal.   Cardiovascular:      Rate and Rhythm: Normal rate and regular rhythm.      Pulses: Normal pulses.      Heart sounds: Normal heart sounds. No murmur heard.  Pulmonary:      Effort: Pulmonary effort is normal. No respiratory distress.      Breath sounds: Normal breath sounds.   Abdominal:      General: Abdomen is flat. Bowel sounds are normal. There is no distension.      Palpations: Abdomen is soft.      Tenderness: There is no abdominal tenderness.   Musculoskeletal:         General: Normal range of motion.      Cervical back: Normal range of motion.      Right lower leg: No edema.      Left lower leg: No edema.   Skin:     General: Skin is warm and dry.      Comments: Skin indicative of scleroderma of hands and face   Neurological:      General: No focal deficit present.      Mental Status: She is alert and oriented to person, place, and time.      Motor: Weakness present.   Psychiatric:         Mood and Affect: Mood normal.         Behavior: Behavior normal.         Thought Content: Thought content normal.         Judgment: Judgment normal.         Fluids    Intake/Output Summary (Last 24 hours) at 6/15/2023 1929  Last data filed at 6/15/2023 1000  Gross per 24 hour   Intake 380 ml   Output 0 ml   Net 380 ml       Laboratory  Recent Labs     06/14/23  1330 06/14/23  1715 06/15/23  0335   WBC 3.2*  --  3.2*   RBC 2.81*  --  3.89*   HEMOGLOBIN 6.0* 8.1* 9.1*   HEMATOCRIT 20.8* 26.1* 29.5*   MCV 74.0*  --  75.8*   MCH 21.4*  --  23.4*   MCHC 28.8*  --  30.8*   RDW 57.3*  --  58.2*   PLATELETCT 108*  --  123*     Recent Labs     06/14/23  1330 06/15/23  0335   SODIUM 134* 130*   POTASSIUM 3.8 4.1   CHLORIDE 91* 90*   CO2 38* 32   GLUCOSE 82 61*   BUN 17 21   CREATININE 1.59* 2.21*   CALCIUM 7.9* 8.3*     Recent Labs     06/14/23  1330   INR 1.47*                Imaging  DX-UPPER GI-SMALL BOWEL FOLLOW THRU    (Results Pending)        Assessment/Plan  Problem Representation:    * Anemia associated with acute blood loss- (present on admission)  Assessment & Plan  Presents to the ED with hemoglobin 6.1, blood loss from AV fistula site at dialysis center.  Per patient she had blood loss at dialysis last Friday for which she came to the ED and required a stitch to be placed.  Also had blood loss from AV fistula site at dialysis on Monday.  History of having transfusion reactions requiring Benadryl and getting a blood transfusion with dialysis.  Anemia likely due to acute blood loss from AV fistula, coagulopathy labs show elevated INR 1.34 with TEG findings largely normal with no indication for FFP or cryo.  -Nephrology consulted, appreciate recs: plan to continue dialysis routine MWF  -Benadryl 50 mg IV for dialysis  -Pending dialysis and AV fistula ability to not bleed, may need to consult vascular surgery  -qam H&H     Severe protein-calorie malnutrition (HCC)- (present on admission)  Assessment & Plan  Patient does not want NG tube but has changed her mind regarding feeding tube. High surgical candidate therefore further studies for cause of nausea pending.  -Nutrition consult  -Vitamins, zinc, copper, iron labs pending  -prealbumin 5  -Boost with meals  -Calorie count  -Upper Gi series pending    Arteriovenous fistula, acquired (HCC)- (present on admission)  Assessment & Plan  Coagulation studies show slight elevation INR 1.47, TEG no indication for FFT/cryo.  Bleeding has resolved  -may need re-evaluation by vascular surgeon   -plan for usage at dialysis tomorrow    Fe deficiency anemia  Assessment & Plan  Fe panel with Fe 59, TIB and Fe% uncalculable, unsat Fe <17 likely due to ESRD and malnutrition  -epoetin with dialysis      Low serum T4 level  Assessment & Plan  T4 0.9 at lower end of range, TSH 9.130. Likely related to malnutrition rather than  indicative of hypothyroidism.  Holding off on synthroid as given patient's malnutrition will not want to increase metabolism.     Disorder of electrolytes  Assessment & Plan  Post dialysis session today  -monitor and replete as needed K>4, PO4>3, Mg>2    Metabolic alkalosis  Assessment & Plan  Potential causes of nausea, patient does not have hyperkalemia or diarrhea etiology potentially associated with ESRD  -Monitor    Systemic lupus erythematosus with organ system involvement (HCC)- (present on admission)  Assessment & Plan  - Continue home Plaquenil, mycophenolate, prednisone    Chronic respiratory failure with hypoxia (HCC)- (present on admission)  Assessment & Plan  Home oxygen requirement 5LNC, no increased requirement on admission. Hx of pulmonary HTN  -monitor oxygen requirement    Pancytopenia (HCC)- (present on admission)  Assessment & Plan  Chronic, potential relation to SLE and medications  -continue to monitor   -coagulation labs sig for elevated INR 1.47    HTN (hypertension)- (present on admission)  Assessment & Plan  We will monitor blood pressure, patient uses clonidine patch/p.o. and amlodipine 10 mg as needed  -Continue home atenolol 25 mg         VTE prophylaxis: SCDs/TEDs and pharmacologic prophylaxis contraindicated due to bleeding risk    I have performed a physical exam and reviewed and updated ROS and Plan today (6/15/2023). In review of yesterday's note (6/14/2023), there are no changes except as documented above.

## 2023-06-16 NOTE — ASSESSMENT & PLAN NOTE
T4 0.9 at lower end of range, TSH 9.130. Likely related to malnutrition and inflammation rather than indicative of hypothyroidism.  - Monitor outpatient for considering supplementation

## 2023-06-17 NOTE — THERAPY
Physical Therapy Contact Note    Patient Name: Lily Nicole  Age:  27 y.o., Sex:  female  Medical Record #: [id number]  Today's Date: 6/17/2023    Hold PT eval today, downtrending hemoglobin and hematocrit. PT to check back as able.

## 2023-06-17 NOTE — PROCEDURES
Diagnosis: End-Stage Renal Disease admitted with bleeding from AV fistula, but also found to have failure to thrive.  Patient refusing PEG tube or G-tube placement. Patient seen and examined on hemodialysis during treatment.  Patient scheduled for extra dialysis/ultrafiltration treatment today for blood transfusion as she is anemic again.  Patient is stable, tolerating hemodialysis. Denies chest pain and shortness of breath. Orders updated as needed. Please refer to flowsheet for full details.    Access: Right arm AV fistula  UF goal: 0.5 to 1 L as tolerated    Plan: Extra HD/UF treatment today to expedite blood transfusion.  Otherwise, continue dialysis Monday Wednesday Friday.    If patient is refusing gastric or PEG tube placement, OK to discharge from Nephrology standpoint.  She will have very close follow-up and monitoring of her hemoglobin in outpatient dialysis.    Navin Metz MD  Nephrology   [facility]

## 2023-06-17 NOTE — PROGRESS NOTES
Pt HGB 7.5 from 9.6. Pt endorses feeling tired, I did a random blood sugar check earlier her cbg 50, she accepted o.j. two sips, she has not allowed recheck. 02 reading not available on her fingers, she refused ears or any place else. Pt appears comfortable sleeping bp 141/91. Two episodes of  minuscule epistaxis resolved easily/quickly. MD alerted.  Blood count dilutional drop after dialysis; hypoglycemia treatment D25 or D50  push depending how low. Pt resting with continuous dextrose 10% running at 35 mL/hr.

## 2023-06-17 NOTE — CONSULTS
"[facility] PSYCHIATRIC CONSULT:Intake  Reason for admission:  evaluation of bleeding from her fistula site.  Patient received dialysis as normally scheduled today but after her dialysis fistula site continued to bleed  Consulting Physician/KIERRA/PA: Raul Anthony M.D.  Reason for Consult: very ill and anxious about tx options  Consultant: Shanika Fortune MD    Legal Status  vol      CC: anxious  HPI: 26 yo female who is very cachectic. She wants to go home after HD but she isn't eating enough and her labs are concerning. She is refusing possible placement of feeding tubes, GI tubes because she doesn't want to see things \"sticking out of my body\" and \"Im tired of so many doctors, nothing gets better, so many appointments I just want to go home\" but denies depression and says \"Im not hopeless, I want to live\". Her anxiety is the main issues. No symptoms of PTSD but does have panic attacks with sweating, nausea, dizziness. Being in large groups of people sets it off as well.  Denies psychosis, wishes to be dead, SI.     Her stomach bothers her and she feels N most of the time. At home she eats but \"I don't know why it comes back up\". She does not like red meat. She likes cold stuff and her stomach feels much better when things are cold. She like milk shakes , yogurt, jello, smoothies and they usually go down well. Her lunch today: sausage, pancakes and something else.     Medication wise: ativan helps with anxiety and nausea. Benadryl with nausea. She has never been tried on psych meds for anxiety. Steroids make her feel hill and also bother her stomach.    Chart(s) Review:  No psychiatric hx on file. Per medical review: frequently hospitalizations and surgeries for pancreatitis and hepatitis since her youth.  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     Medical ROS:  Review of systems : per tx tm: reviewed.     Psychiatric Exam (MSE):  Vitals:Blood pressure (!) 151/113, pulse (!) 101, temperature 35.8 °C (96.5 °F), temperature source Temporal, resp. rate 14, height 1.651 m (5' 5\"), weight 40.5 kg (89 lb 4.6 oz), SpO2 100 %, not currently breastfeeding.    Constitutional: good eye contact, cooperative, cachectic. Hair loss  General Appearance: as noted  Musculoskeletal: none but pt reports she can't walk because of her debility  Alert/Orientation: x 4  Attn/Concentration: intact  Fund of Knowledge:not tested  Memory recent/remote: grossly intact  Speech: wnl  Language:  fluent  Thought Content: denies psychosis, SI/HI       Thought Process:  linear ,  goal oriented ( to go home, to get better)   coherent  Insight/Judgement: fair as she is refusing procedures that may facilitate her dying which she doesn't want.  Mood:anxious  Affect: she is anxious but also depressed    Past Medical Hx:     Past Medical History:   Diagnosis Date    Anemia 01/17/2018    AVF (arteriovenous fistula) ([facility])     Right Arm    Chest pain     Chest tightness     Daytime sleepiness     Dialysis patient ([facility])      dialysis, M,W,F Elizabeth/Joni    Difficulty breathing     ESRD (end stage renal disease) on dialysis ([facility]) 01/17/2018    Twan Fu for breath     Heart burn     Hypertension 01/17/2018    \"Controlled with medication\"    Indigestion     Lupus ([facility])     Migraines 01/17/2018    Painful breathing     Palpitations     Seizure ([facility]) 2013    from high blood pressure, reports 1 time event    Shortness of breath     Swelling of lower extremity     Wheezing        Past Psychiatric Hx:  SI/SAs:denies  Hospitalizations: denies  Dx:none  Medication Trials: none       Family Psych Hx:  Family History   Problem Relation Age of Onset    Diabetes Paternal Grandmother        Social Hx:  Housing: lives with sister and mom but because they work, she " "is alone most of the day and watches TV because \"I can't do anything else\"     Support: mom and sister  Abuse:denies  Drugs/Alcohol: denies    Labs:  Lab Results   Component Value Date/Time    AMPHUR Negative 11/28/2021 1723    BARBSURINE Negative 11/28/2021 1723    BENZODIAZU Negative 11/28/2021 1723    COCAINEMET Negative 11/28/2021 1723    METHADONE Negative 11/28/2021 1723    OPIATES Negative 11/28/2021 1723    OXYCODN Negative 11/28/2021 1723    PCPURINE Negative 11/28/2021 1723    PROPOXY Negative 11/28/2021 1723    CANNABINOID Negative 11/28/2021 1723     Recent Labs     06/15/23  0335 06/16/23  0915 06/17/23  0108 06/17/23  1006   WBC 3.2* 3.9* 2.1*  --    RBC 3.89* 4.09* 3.16*  --    HEMOGLOBIN 9.1* 9.6* 7.5* 6.4*   HEMATOCRIT 29.5* 30.7* 24.4* 23.5*   MCV 75.8* 75.1* 77.2*  --    MCH 23.4* 23.5* 23.7*  --    RDW 58.2* 60.9* 65.1*  --    PLATELETCT 123* 190 101*  --    NEUTSPOLYS 75.90* 74.70* 61.30  --    LYMPHOCYTES 16.30* 19.00* 27.40  --    MONOCYTES 6.30 4.70 9.90  --    EOSINOPHILS 0.60 0.50 0.50  --    BASOPHILS 0.60 0.80 0.90  --      Recent Labs     06/15/23  0335 06/16/23  0626 06/17/23  0108   SODIUM 130* 128* 134*   POTASSIUM 4.1 4.9 3.9   CHLORIDE 90* 89* 97   CO2 32 30 31   GLUCOSE 61* 69 372*   BUN 21 29* 15       Cranial Imaging: personally reviewed  Cranial CT: 2022: unremarkable  Cranial MRI: 2018: ischemic white matter disease with 3 small hyperintense foci in the deep white matter of B frontal lobes.     EKG: QTc:  none       Meds Current:  Scheduled Medications   Medication Dose Frequency    mirtazapine  30 mg QHS    zinc sulfate  220 mg DAILY    OLANZapine  2.5 mg Q EVENING    oxymetazoline  2 Spray BID    epoetin  13,000 Units MO, WE + FR    thiamine  100 mg DAILY    senna-docusate  2 Tablet BID    hydroxychloroquine  200 mg QAM    predniSONE  5 mg DAILY    omeprazole  20 mg BID    mycophenolate  500 mg QAM    folic acid  1 mg DAILY    therapeutic multivitamin-minerals  1 Tablet DAILY "     Allergies: Cephalexin, Clindamycin, Hydrocodone, Maxipime [cefepime], Methylprednisolone, Metoprolol,  , Diagnostic x-ray materials, Furosemide, Hydroxyzine hcl, Insulin, Compazine, Fentanyl and related, Metoclopramide, and Tape      Assessement    1. Anxiety disorder [facility]      -R/O adjustment disorder with anxiety      -R/O social anxiety  2 Depressive disorder [facility]    Medical:   -HTN  -hx of nida antwan tear  -severe protein calorie malnutrition  -LUPUS and on chronic steroid regimen: can effect her mood: plaquenil, mycophenolate  -chronic respiratory failure  -hypothyroidism  -anemia with transfusion: it will effect her mood  -ESRD and HD  -pancytopenia    Recommendations:  Legal Status:  vol       Discussed/voalted: Raul Anthony MD, GETACHEW Stevenson MD    Medication and Other Recommendations: final orders as per Tx Tm  Instead of remeron whose levels can be effected by kidney disease, consider lexapro 20 mg.   2    agree with low dose zyprexa but do not increase as it can be affected by kidney function  3    change prn ativan to routine ativan 0.5 mg tid (discussed risks, benefits etc but even if she becomes addicted, if she doesn't improved her eating she will die)  4.    Would give routine benadryl which help her with N as well: 25 mg tid. As a substitute which can be tried .  5.    Concerns of her cardiac health: consider echo. With severe malnutrition any medications that have insulin like qualities (ie bactrim) can provoke severe hypoglycemia. I doubt she has any glycogen stores.   6      while she might not eat: her current diet is inappropriate. She needs multiple smaller meals with anything and everything she likes: shakes, smoothies, yogurt, etc. They can be fortified but she is not going to eat sausages and pancakes for sure. Consider nutritional consult for food preferences that she wants/likes.   7.   It appears that her N has an anxiety component. However could she have gastric paresis playing  a role? REGIS kulkarni?       Will continue to follow with you.    Thank you for the consult.           Discharge recommendations: per tx tm     If released from [facility]: Discharge Instructions:  -Reviewed safety plan: 911, ER, PCM, MHC, Suicide crisis line  -Please assist with outpatient Psychiatric/substance use follow up appointments at discharge once medically cleared.

## 2023-06-17 NOTE — DIETARY
Calorie Count Results Day 1:   Day 3 of admit.  Lily Nicole is a 27 y.o. female with admitting DX of Anemia associated with acute blood loss [D62]    Pt was NPO on 6/16 starting at 0806 and PO diet started again on 6/17 @ 0950. Pt's current diet is a Regular Diet with Boost Glucose Control BID. RD communicated with RN. Per RN, pt did not eat breakfast or lunch today and RN was unsure why. RD attempted to meet with pt at beside but she was not in her room and currently in dialysis.     6/16 Breakfast 0 kcal, 0 g protein  6/16 Lunch 0 kcal, 0 g protein  6/16 Dinner 0 kcal, 0 g protein  Total x 24 hrs on 6/16 (0 Meals, 0 Snacks).    Recommendations/Plan:  Continue current PO diet  Continue Boost Glucose Control BID  Recommend to initiate nutrition support if pt agreeable  Document intake of all meals and supplements as % taken in ADL's to provide interdisciplinary communication across all shifts.   Record on meal ticket amount consumed of each item on meal tray. Place meal ticket in calorie count envelope posted outside of pt's room.     RD following.

## 2023-06-17 NOTE — PROGRESS NOTES
[facility] Internal Medicine Daily Progress Note    Date of Service  6/17/2023    [facility] Team: R IM Orange Team   Attending: Selam Stevenson M.d.  Senior Resident: Dr. Kt Deras  Intern:  Dr. Raul Anthony  Contact Number: [phone]    Chief Complaint  Lily Nicole is a 27 y.o. female admitted 6/14/2023 with Acute blood loss from right arm AV fistula    Hospital Course  Lily Nicole is a 27 y.o. female with significant medical history of SLE glomerulonephritis syndrome with  ESRD on hemodialysis (on plaquenil, mycophenolate, prednisone), malnutrition, seizure associated with hypoglycemia, HTN, constipation with chronic opioid use, migraines, pulmonary hypertension on 5LNC who presented 6/14/2023 with acute blood loss anemia from her AV fistula (placed 11-12 yrs ago) while at the dialysis center with hemoglobin 6.1. Patient transfused 1 uPRBC irradiated with dialysis and benadryl 50mg IV due to history of shortness of breath associated with transfusions. Hemoglobin stabilized at 9.1.   Her severe malnutrition status has been discussed and further workup for nausea leading to decreased appetite is pending with upper GI study. GI and General Surgery have been called regarding placing a G tube or PEG, at this time recs made for further studies and possible NG tube placement given her current condition she has a high risk of bleeding and difficulty healing.   Patient is also having hypoglycemic episodes associated with low appetite.    Interval Problem Update  Overnight hypoglycemic to 50, drank 2 sips of orange juice recheck 59  This a.m. patient was very tired did not want to talk much.  Epistasis has stopped yesterday, hemoglobin dropped and patient was transfused at dialysis.  Discussed NG tube and PEG tube and patient states that she does not want those to be done.  Nausea had resolved with Ativan.  -GI consulted and assessed patient today, recs to start agent for appetite   -Psychiatry  consulted recs need for medications for anxiety, nausea, appetite  -Psychology consult placed  -Palliative care consult placed  -Echo pending  -A.m. labs for evaluation of adrenal insufficiency    I have discussed this patient's plan of care and discharge plan at IDT rounds today with Case Management, Nursing, Nursing leadership, and other members of the IDT team.    Consultants/Specialty  No formal consults - case was discussed with General Surgery and GI    Code Status  Full Code    Disposition  The patient is not medically cleared for discharge to home or a post-acute facility.      I have placed the appropriate orders for post-discharge needs.    Review of Systems  Review of Systems   Constitutional:  Positive for malaise/fatigue and weight loss.   HENT:          Nosebleed stopped   Gastrointestinal:  Negative for nausea and vomiting.   Genitourinary:         Anuric   Musculoskeletal:  Positive for back pain, joint pain, myalgias and neck pain.   Neurological:  Positive for weakness. Negative for dizziness.        Physical Exam  Temp:  [35.8 °C (96.4 °F)-37 °C (98.6 °F)] 36.7 °C (98.1 °F)  Pulse:  [] 105  Resp:  [] 18  BP: (119-158)/() 140/107  SpO2:  [89 %-100 %] 96 %    Physical Exam  Constitutional:       General: She is not in acute distress.     Appearance: She is ill-appearing. She is not toxic-appearing or diaphoretic.      Comments: Cachectic with temporal wasting, fatigued listening but falling asleep   HENT:      Head: Normocephalic and atraumatic.      Comments: No active nosebleeds     Right Ear: External ear normal.      Left Ear: External ear normal.      Nose: Nose normal.      Mouth/Throat:      Mouth: Mucous membranes are dry.   Eyes:      General: No scleral icterus.        Right eye: No discharge.         Left eye: No discharge.      Extraocular Movements: Extraocular movements intact.      Conjunctiva/sclera: Conjunctivae normal.   Cardiovascular:      Rate and Rhythm:  Normal rate and regular rhythm.      Pulses: Normal pulses.      Heart sounds: Normal heart sounds. No murmur heard.  Pulmonary:      Effort: Pulmonary effort is normal. No respiratory distress.      Breath sounds: Normal breath sounds.   Abdominal:      General: Abdomen is flat. Bowel sounds are normal. There is no distension.      Palpations: Abdomen is soft.      Tenderness: There is no abdominal tenderness.   Musculoskeletal:         General: Normal range of motion.      Cervical back: Normal range of motion.      Right lower leg: No edema.      Left lower leg: No edema.   Skin:     General: Skin is warm and dry.      Comments: Skin indicative of scleroderma of hands and face   Neurological:      General: No focal deficit present.      Mental Status: She is alert and oriented to person, place, and time.      Motor: Weakness present.   Psychiatric:         Mood and Affect: Mood normal.         Behavior: Behavior normal.         Thought Content: Thought content normal.         Judgment: Judgment normal.         Fluids    Intake/Output Summary (Last 24 hours) at 6/17/2023 1822  Last data filed at 6/17/2023 1445  Gross per 24 hour   Intake 850 ml   Output 1379 ml   Net -529 ml       Laboratory  Recent Labs     06/15/23  0335 06/16/23  0915 06/17/23  0108 06/17/23  1006   WBC 3.2* 3.9* 2.1*  --    RBC 3.89* 4.09* 3.16*  --    HEMOGLOBIN 9.1* 9.6* 7.5* 6.4*   HEMATOCRIT 29.5* 30.7* 24.4* 23.5*   MCV 75.8* 75.1* 77.2*  --    MCH 23.4* 23.5* 23.7*  --    MCHC 30.8* 31.3* 30.7*  --    RDW 58.2* 60.9* 65.1*  --    PLATELETCT 123* 190 101*  --      Recent Labs     06/15/23  0335 06/16/23  0626 06/17/23  0108   SODIUM 130* 128* 134*   POTASSIUM 4.1 4.9 3.9   CHLORIDE 90* 89* 97   CO2 32 30 31   GLUCOSE 61* 69 372*   BUN 21 29* 15   CREATININE 2.21* 3.31* 2.47*   CALCIUM 8.3* 8.6 8.1*                   Imaging  IR-US GUIDED PIV    (Results Pending)   EC-ECHOCARDIOGRAM COMPLETE W/O CONT    (Results Pending)         Assessment/Plan  Problem Representation:    * Anemia associated with acute blood loss- (present on admission)  Assessment & Plan  Presents to the ED with hemoglobin 6.1, blood loss from AV fistula site at dialysis center.  Per patient she had blood loss at dialysis last Friday for which she came to the ED and required a stitch to be placed.  Also had blood loss from AV fistula site at dialysis on Monday.  History of having transfusion reactions requiring Benadryl and getting a blood transfusion with dialysis.  Anemia likely due to acute blood loss from AV fistula, coagulopathy labs show elevated INR 1.34 with TEG findings largely normal with no indication for FFP or cryo.  -Nephrology consulted, appreciate recs: plan to continue dialysis routine MWF  -Benadryl 25 mg IV for dialysis  -Dialysis using AV fistula without further bleeding  -Epistaxis 6/16 with Hgb of 6.4, transfused 1upRBC with dialysis  -qam H&H     Severe protein-calorie malnutrition (HCC)- (present on admission)  Assessment & Plan  Patient does not want NG tube but has changed her mind regarding feeding tube. High surgical candidate therefore further studies for cause of nausea pending.  -Nutrition consult  -Vitamins, copper labs pending  -Low Zn replete with 50mg daily  -prealbumin 5  -Boost with meals  -Calorie count  -Gi consulted, appreciate recs for marinol. Working up adrenal insufficiency  -Psychiatry consulted recs made for scheduled ativan and benadryl, remeron switch to lexapro 20 mg, zyprexa continue at current dose. Echo pending for workup  -Psychology consult placed  -palliative care consult placed     Arteriovenous fistula, acquired (HCC)- (present on admission)  Assessment & Plan  Coagulation studies show slight elevation INR 1.47, TEG no indication for FFT/cryo.  Bleeding has resolved  -may need re-evaluation by vascular surgeon   -No bleeding from AV fistula during dialysis thus far    Fe deficiency anemia  Assessment &  Plan  Fe panel with Fe 59, TIB and Fe% uncalculable, unsat Fe <17 likely due to ESRD and malnutrition  -epoetin with dialysis      Subclinical hypothyroidism  Assessment & Plan  T4 0.9 at lower end of range, TSH 9.130. Likely related to malnutrition rather than indicative of hypothyroidism.  Holding off on synthroid as given patient's malnutrition will not want to increase metabolism.     Disorder of electrolytes  Assessment & Plan  Post dialysis session today  -monitor and replete as needed K>4, PO4>3, Mg>2    Metabolic alkalosis  Assessment & Plan  Resolved  Potential causes of nausea, patient does not have hyperkalemia or diarrhea etiology potentially associated with ESRD      Systemic lupus erythematosus with organ system involvement (HCC)- (present on admission)  Assessment & Plan  - Continue home Plaquenil, mycophenolate, prednisone    Hyponatremia- (present on admission)  Assessment & Plan  Sodium 128 moderate hyponatremia, historically has mild to moderate with  lowest 120.  Etiology likely considerable due to malnutrition and ESRD  -Will receive D10 and NS  -Continue to monitor    Chronic respiratory failure with hypoxia (HCC)- (present on admission)  Assessment & Plan  Home oxygen requirement 5LNC, no increased requirement on admission. Hx of pulmonary HTN  -monitor oxygen requirement    Pancytopenia (HCC)- (present on admission)  Assessment & Plan  Chronic, potential relation to SLE and medications  -continue to monitor   -coagulation labs sig for elevated INR 1.47    HTN (hypertension)- (present on admission)  Assessment & Plan  We will monitor blood pressure, patient uses clonidine patch/p.o. and amlodipine 10 mg as needed  -Continue home atenolol 25 mg         VTE prophylaxis: SCDs/TEDs and pharmacologic prophylaxis contraindicated due to bleeding risk    I have performed a physical exam and reviewed and updated ROS and Plan today (6/17/2023). In review of yesterday's note (6/16/2023), there are no  changes except as documented above.

## 2023-06-17 NOTE — PROGRESS NOTES
Pt refused all medications this morning attempted 3 times to administer them to her. During the last attempt Pt sat up asked for another blanket and said I don't want them now. Pt repositioned self, extra blanket placed on her, room thermostat turned up as per her request. Pt sleeping with ventilation noted.

## 2023-06-17 NOTE — CARE PLAN
Problem: Pain - Standard  Goal: Alleviation of pain or a reduction in pain to the patient’s comfort goal  Outcome: Progressing     Problem: Knowledge Deficit - Standard  Goal: Patient and family/care givers will demonstrate understanding of plan of care, disease process/condition, diagnostic tests and medications  Outcome: Progressing     Problem: Fall Risk  Goal: Patient will remain free from falls  Outcome: Progressing   The patient is Unstable - High likelihood or risk of patient condition declining or worsening    Shift Goals  Clinical Goals: monitor blood glucose, pain mgmt, address nutrition, iv fluids  Patient Goals: rest, manage nausea, pain mgmt  Family Goals: hannah    Progress made toward(s) clinical / shift goals:  Patient will continue to be updated with plan of care, patient will remain free from falls, patients pain will be controlled.    Patient is not progressing towards the following goals:

## 2023-06-17 NOTE — PROGRESS NOTES
Hand off report given to Shira, patient was given benadryl before transporting to Dialysis, patient signed Blood consent in chart, patient to receive 1 unit of PRBC while at dialysis. Patient was updated with plan of care.

## 2023-06-17 NOTE — PROGRESS NOTES
[facility] Services Progress Note     PUF  treatment ordered today per Dr. Navin Metz x 1.5 hours. C/o SOB on oxygen at 5LPM via nasal cannula saturating well at 96%; pt for 1 unit pRBC as ordered; Blood consent signed by patient.  Treatment initiated at 1314 ended at 1434.      See electronic flow sheet for details.      Net UF 1000 mL.   Received 1 unit pRBC; tolerated no adverse reaction noted      Needles removed from access site. Dressings applied and sites held x 10 minutes; verified no bleeding. Positive bruit/thrill post tx.   Staff RN to monitor AVF/G for breakthrough bleeding. Should breakthrough bleeding occur, staff RN to apply pressure to access sites until bleeding resolved.   Notify Dialysis and Nephrologist for follow-up.     Report given to Primary RN Shira Ambrocio

## 2023-06-17 NOTE — CARE PLAN
The patient is Watcher - Medium risk of patient condition declining or worsening    Shift Goals  Clinical Goals: Monitor blood sugar, pain management, encourage oral nutrition, IV fluids  Patient Goals: Rest, Pain management, Monitor blood sugar  Family Goals: Comfort, Rest, get well    Progress made toward(s) clinical / shift goals:      Problem: Pain - Standard  Goal: Alleviation of pain or a reduction in pain to the patient’s comfort goal  Outcome: Progressing  Flowsheets  Taken 6/17/2023 0109  OB Pain Intervention: Medication - See MAR  Pain Rating Scale (NPRS): 0  Taken 6/16/2023 2150  Non Verbal Scale:   Sleeping   Calm  Aldridge-Baker Scale: 0   Note: Pt sleeping with ventilation noted.       Problem: Risk for Bleeding  Goal: Patient will take measures to prevent bleeding and recognizes signs of bleeding that need to be reported immediately to a health care professional  Outcome: Progressing  Note: Pt experienced mild epistaxis, kleenex had tiny spots of blood, Afrin nasal spray sprayed in each nare as per Pt request, with moderately good results, Pt did experience another mild episode of bleeding, but it subsided quickly.       Problem: Skin Integrity  Goal: Skin integrity is maintained or improved  Outcome: Progressing  Note: Low air loss bed in place Pt skin dry, flaky, fragile, hair loss with sore on the superior aspect of her head. RUE dialysis port thrill palpated, bluish discoloration of skin in the area. Pt able to reposition herself without assist.      Random CBG check reveal blood sugar of 50. The only intervention she accepted was  O.J. she took a couple of small sips. Pt made reference to the fluids infusing, she had no s/sx of hypoglycemia.     RUE dialysis AV fistula/thrill palpated. Pt anuric.     Pt anxiety treated with Ativan 0.5 mg;  she reported feeling nauseated and requested IV push Zofran 4 mg both medications effective. Pt did refuse her HS Mirtazapine 15 mg she did not give a reason.              Patient is not progressing towards the following goals:

## 2023-06-18 PROBLEM — R00.0 SINUS TACHYCARDIA SEEN ON CARDIAC MONITOR: Status: ACTIVE | Noted: 2023-01-01

## 2023-06-18 NOTE — PROGRESS NOTES
Pulled ativan and dilaudid to give to patient before dobhoff placement, went to room to give and echo is there and said they would at least 30 mins, holding dilaudid to give when echo is finished.

## 2023-06-18 NOTE — PROGRESS NOTES
Patient's Blood glucose is 40. MD is notified will hang D10% PRN first and will continue NaCl 154 mEq in D10W.

## 2023-06-18 NOTE — CONSULTS
"  [facility] PSYCHIATRIC FOLLOW-UP:(established)  *Reason for admission: evaluation of bleeding from her fistula site.  Patient received dialysis as normally scheduled today but after her dialysis fistula site continued to bleed        Legal Status  vol      S:  curled up in bed, eyes closed but answers questions. She feels a little better with the ativan and benadryl in terms of anxiety. She says she is not SI and is eating. However she refused the lexapro and some of the vitamins. She is refusing glucose checks, plaquenil, mycophenolate, dextrose and others meds at times. Meds are being adjusted to after breakfast, which the pt has requested be done.     Her behavior is in directed contraindication to what she verbalized which is she wants to get better and live.This brings up concerns for capacity. (See below)    Note:   6/17/2023:Assessment was done with pt, her continuous NaCl 154 mEq in D10 W is done,as I'm about to hang a new bag,pt refused, despite education was provided about this IV. Witnessed by patient's mom. Pt's mom tried to convinced pt but pt refused. Dr. LAW Sawant notified. MD also ask if pt would allow me to check her blood glucose, Pt refused as well stating \"my hands are all poked and achy. All the treatments are not helping at all, I just wanted to go home.     O: Medical ROS (as pertinent):                      *Psychiatric Examination:   Vitals:   Vitals:    06/18/23 0000 06/18/23 0400 06/18/23 0721 06/18/23 0900   BP: (!) 152/105 (!) 136/98 (!) 155/103    Pulse: (!) 107 (!) 101 100    Resp: 12 12 13    Temp: 36.8 °C (98.2 °F) 36.9 °C (98.4 °F) 36.9 °C (98.5 °F)    TempSrc: Temporal Temporal Temporal    SpO2: 100% 95% 94%    Weight:    39.3 kg (86 lb 10.3 oz)   Height:         General Appearance:  poor eye contact.   Abnormal Movements: none   Gait and Posture: not observed  Speech: minimal  Thought Process: slow rate  Associations:   linear  Abnormal or Psychotic Thoughts: " none  Judgement and Insight: improved  Orientation: grossly intact  Recent and Remote Memory: grossly intact  Attention Span and Concentration: intact  Language:fluent  Fund of Knowledge: not tested  Mood and Affect: depressed and anxious  SI/HI:  suicidal - no but her behavior while understandable,  is, indirectly dangerous to herself.         Current Medications:  Scheduled Medications   Medication Dose Frequency    pantoprazole  40 mg DAILY    thiamine  100 mg DAILY    zinc sulfate  220 mg DAILY    dronabinol  2.5 mg BEFORE LUNCH AND DINNER    escitalopram  20 mg DAILY    LORazepam  0.5 mg TID    diphenhydrAMINE  25 mg TID    predniSONE  5 mg DAILY    OLANZapine  2.5 mg Q EVENING    epoetin  13,000 Units MO, WE + FR    senna-docusate  2 Tablet BID    hydroxychloroquine  200 mg QAM    mycophenolate  500 mg QAM    folic acid  1 mg DAILY    therapeutic multivitamin-minerals  1 Tablet DAILY          *ASSESSMENT/RECOMENDATIONS:  1. Anxiety disorder [facility]      -R/O adjustment disorder with anxiety      -R/O social anxiety  2 Depressive disorder [facility]     Medical:   -HTN  -hx of nida antwan tear  -severe protein calorie malnutrition  -LUPUS and on chronic steroid regimen: can effect her mood: plaquenil, mycophenolate  -chronic respiratory failure  -hypothyroidism  -anemia with transfusion: it will effect her mood  -ESRD and HD  -pancytopenia     Recommendations:  Legal Status:  vol        Discussed/voalted:  GETACHEW Stevenson MD     Medication and Other Recommendations: final orders as per Tx Tm  No changes today   2   am unable to find any information about stimulant use in depression in severe malnutrition so far. While her anxiety could worsen, if she felt less fatigue and depression, they could help stimulate her to help herself. However they could also cause a potentially dangerous energy toll on her debilitated body  might cause more harm. Will continue to research.     Will continue to follow with you.             Discharge recommendations: per tx tm      If released from [facility]: Discharge Instructions:  -Reviewed safety plan: 911, ER, PCM, MHC, Suicide crisis line  -Please assist with outpatient Psychiatric/substance use follow up appointments at discharge once medically cleared.

## 2023-06-18 NOTE — PROGRESS NOTES
Patients current Blood glucose is 64 after the D10 bolus. MD and charge made aware. Per MD okay to continue Nacl 154 mEq in D10W and diets PRN. Noted and will carry out accordingly.

## 2023-06-18 NOTE — CARE PLAN
Problem: Pain - Standard  Goal: Alleviation of pain or a reduction in pain to the patient’s comfort goal  Outcome: Progressing     Problem: Knowledge Deficit - Standard  Goal: Patient and family/care givers will demonstrate understanding of plan of care, disease process/condition, diagnostic tests and medications  Outcome: Progressing     Problem: Fall Risk  Goal: Patient will remain free from falls  Outcome: Progressing   The patient is Watcher - Medium risk of patient condition declining or worsening    Shift Goals  Clinical Goals: pain, nausea, nutrition, blood glucose monitoring  Patient Goals: rest, pain mgmt  Family Goals: hannah    Progress made toward(s) clinical / shift goals:  patient will remain free from falls, patient will continue to be updated on plan of care, patient pain management will be controlled    Patient is not progressing towards the following goals:

## 2023-06-18 NOTE — PROGRESS NOTES
Telemetry Report:    Rhythm: SR  Heart Rate: 92  Ectopy:     MT: 0.18 sec  QRS: 0.09 sec  QT: 0.31 sec                Per telemetry room monitor

## 2023-06-18 NOTE — PROGRESS NOTES
Dr. LAW Golden is now in patient's room at first pt is still hesitant to continue her  NaCl 154 mEq in D10W and blood glucose checks.MD explained   the importance of the this treatment patient agreed to re start the IV and blood glucose check.

## 2023-06-18 NOTE — PROGRESS NOTES
[facility] Internal Medicine Daily Progress Note    Date of Service  6/18/2023    [facility] Team: R IM Orange Team   Attending: Selam Stevenson M.d.  Senior Resident: Dr. Kt Deras  Intern:  Dr. Raul Anthony  Contact Number: [phone]    Chief Complaint  Lily Nicole is a 27 y.o. female admitted 6/14/2023 with Acute blood loss from right arm AV fistula    Hospital Course  Lily Nicole is a 27 y.o. female with significant medical history of SLE glomerulonephritis syndrome with  ESRD on hemodialysis (on plaquenil, mycophenolate, prednisone), malnutrition, seizure associated with hypoglycemia, HTN, constipation with chronic opioid use, migraines, pulmonary hypertension on 5LNC who presented 6/14/2023 with acute blood loss anemia from her AV fistula (placed 11-12 yrs ago) while at the dialysis center with hemoglobin 6.1. Patient transfused 1 uPRBC irradiated with dialysis and benadryl 50mg IV due to history of shortness of breath associated with transfusions. Hemoglobin stabilized at 9.1.   Her severe malnutrition status has been discussed and further workup for nausea leading to decreased appetite is pending with upper GI study. GI and General Surgery have been called regarding placing a G tube or PEG, at this time recs made for further studies and possible NG tube placement given her current condition she has a high risk of bleeding and difficulty healing.   Patient is also having hypoglycemic episodes associated with low appetite.    Interval Problem Update  Overnight hypoglycemic to 40 initially did not want D10 to be restarted, post IV restart BGL 64.   This a.m. patient was fatigued and only answered yes to nausea and pain, in the afternoon she was awake with family in the room. Nausea controlled with ativan, vomited x1. Family and her are continuing to discuss NG/NJ tube for nutrition given the severity of her malnutrition. Patient hesitant for NJ tube as she had an uncomfortable  experience before leading her to cough the tube out.  Agreeable to NJ placement if she is made comfortable during the process.   -discussed with GI and IR possible NG tube placement options, per GI bedside dobhoff placement by nursing  -Dobhoff placed this evening, patient tachycardic and lots of pain and anxiety -> given Ativan 0.5mg and dilaudid 0.5mg. Obtained EKG, discussed with Cardiology no ischemic event, LVH, lateral leads with repolarization and elevated NJ  -Dobhoff feed pulled back from duodenum will need to be advanced back to original location  -Nutrition consult tomorrow to start tube feeds  -Echo pending read  -A.m. labs for evaluation of adrenal insufficiency pending ACTH  -AM meds change start time to 10:00, convert PO meds to IV     I have discussed this patient's plan of care and discharge plan at IDT rounds today with Case Management, Nursing, Nursing leadership, and other members of the IDT team.    Consultants/Specialty  No formal consults - case was discussed with General Surgery and GI    Code Status  Full Code    Disposition  The patient is not medically cleared for discharge to home or a post-acute facility.      I have placed the appropriate orders for post-discharge needs.    Review of Systems  Review of Systems   Constitutional:  Positive for malaise/fatigue and weight loss.   Gastrointestinal:  Positive for nausea and vomiting.   Genitourinary:         Anuric   Musculoskeletal:  Positive for back pain, joint pain, myalgias and neck pain.   Neurological:  Positive for weakness.        Physical Exam  Temp:  [36.3 °C (97.3 °F)-36.9 °C (98.5 °F)] 36.3 °C (97.3 °F)  Pulse:  [] 101  Resp:  [12-14] 12  BP: (127-155)/() 127/93  SpO2:  [90 %-100 %] 100 %    Physical Exam  Constitutional:       General: She is not in acute distress.     Appearance: She is ill-appearing. She is not toxic-appearing or diaphoretic.      Comments: Cachectic with temporal wasting, fatigued. More awake this  afternoon with family around   HENT:      Head: Normocephalic and atraumatic.      Comments: Small nosebleed afternoon     Right Ear: External ear normal.      Left Ear: External ear normal.      Nose: Nose normal.      Mouth/Throat:      Mouth: Mucous membranes are dry.   Eyes:      General: No scleral icterus.        Right eye: No discharge.         Left eye: No discharge.      Extraocular Movements: Extraocular movements intact.      Conjunctiva/sclera: Conjunctivae normal.   Cardiovascular:      Rate and Rhythm: Normal rate and regular rhythm.      Pulses: Normal pulses.      Heart sounds: Normal heart sounds. No murmur heard.  Pulmonary:      Effort: Pulmonary effort is normal. No respiratory distress.      Breath sounds: Normal breath sounds.   Abdominal:      General: Abdomen is flat. Bowel sounds are normal. There is no distension.      Palpations: Abdomen is soft.      Tenderness: There is no abdominal tenderness.   Musculoskeletal:         General: Normal range of motion.      Cervical back: Normal range of motion.      Right lower leg: No edema.      Left lower leg: No edema.      Comments: Right arm with a/v fistula no bleed   Skin:     General: Skin is warm and dry.      Comments: Skin indicative of scleroderma of hands and face   Neurological:      General: No focal deficit present.      Mental Status: She is alert and oriented to person, place, and time.      Motor: Weakness present.   Psychiatric:         Mood and Affect: Mood normal.         Behavior: Behavior normal.         Thought Content: Thought content normal.         Judgment: Judgment normal.         Fluids    Intake/Output Summary (Last 24 hours) at 6/18/2023 1819  Last data filed at 6/17/2023 2200  Gross per 24 hour   Intake 272 ml   Output --   Net 272 ml       Laboratory  Recent Labs     06/16/23  0915 06/17/23  0108 06/17/23  1006 06/18/23  0824   WBC 3.9* 2.1*  --  3.7*   RBC 4.09* 3.16*  --  4.17*   HEMOGLOBIN 9.6* 7.5* 6.4* 10.1*    HEMATOCRIT 30.7* 24.4* 23.5* 32.4*   MCV 75.1* 77.2*  --  77.7*   MCH 23.5* 23.7*  --  24.2*   MCHC 31.3* 30.7*  --  31.2*   RDW 60.9* 65.1*  --  63.1*   PLATELETCT 190 101*  --  107*     Recent Labs     06/16/23  0626 06/17/23  0108 06/18/23  0806   SODIUM 128* 134* 129*   POTASSIUM 4.9 3.9 4.2   CHLORIDE 89* 97 94*   CO2 30 31 29   GLUCOSE 69 372* 79   BUN 29* 15 22   CREATININE 3.31* 2.47* 3.96*   CALCIUM 8.6 8.1* 8.3*                   Imaging  EC-ECHOCARDIOGRAM COMPLETE W/O CONT   Final Result      DX-ABDOMEN FOR TUBE PLACEMENT   Final Result      Feeding tube tip at the distal stomach.      These findings were discussed with MARLENE BRENNER on 6/18/2023 5:01 PM.         DX-ABDOMEN FOR TUBE PLACEMENT   Final Result         Feeding tube with tip projecting over the expected area of the first portion duodenum.      IR-US GUIDED PIV    (Results Pending)   [id number] VIEW    (Results Pending)        Assessment/Plan  Problem Representation:    * Anemia associated with acute blood loss- (present on admission)  Assessment & Plan  Presents to the ED with hemoglobin 6.1, blood loss from AV fistula site at dialysis center.  Per patient she had blood loss at dialysis last Friday for which she came to the ED and required a stitch to be placed.  Also had blood loss from AV fistula site at dialysis on Monday.  History of having transfusion reactions requiring Benadryl and getting a blood transfusion with dialysis.  Anemia likely due to acute blood loss from AV fistula, coagulopathy labs show elevated INR 1.34 with TEG findings largely normal with no indication for FFP or cryo.  -Nephrology consulted, appreciate recs: plan to continue dialysis routine MWF  -Benadryl 25 mg IV for dialysis  -Dialysis using AV fistula without further bleeding  -Epistaxis 6/16 with Hgb of 6.4, transfused 1upRBC with dialysis  -qam H&H     Severe protein-calorie malnutrition (HCC)- (present on admission)  Assessment & Plan  Patient does not  want NG tube but has changed her mind regarding feeding tube. High surgical candidate therefore further studies for cause of nausea pending.  -Nutrition consult  -Vitamins, copper low - replete with tube feeds if possible, multivitamin po  -Low Zn replete with 50mg daily  -prealbumin 5  -Boost with meals  -Calorie count  -Gi consulted, appreciate recs for marinol. Working up adrenal insufficiency  -Psychiatry consulted recs made for scheduled ativan and benadryl, remeron switch to lexapro 20 mg, zyprexa continue at current dose. Echo pending for workup  -Psychology consult placed  -palliative care consult placed   -dobhoff placed, will need to be moved forward into duodenum. Patient tachycardic to 120-130s post placement. Given ativan 0.5mg and dilaudid 0.5mg, EKG w/o ischemic changes confirmed with Cardiology.  -Nutrition consult for tube feed tomorrow     Arteriovenous fistula, acquired (HCC)- (present on admission)  Assessment & Plan  Coagulation studies show slight elevation INR 1.47, TEG no indication for FFT/cryo.  Bleeding has resolved  -may need re-evaluation by vascular surgeon   -No bleeding from AV fistula during dialysis thus far    Fe deficiency anemia  Assessment & Plan  Fe panel with Fe 59, TIB and Fe% uncalculable, unsat Fe <17 likely due to ESRD and malnutrition  -epoetin with dialysis      Subclinical hypothyroidism  Assessment & Plan  T4 0.9 at lower end of range, TSH 9.130. Likely related to malnutrition rather than indicative of hypothyroidism.  Holding off on synthroid as given patient's malnutrition will not want to increase metabolism.     Disorder of electrolytes  Assessment & Plan  Post dialysis session today  -monitor and replete as needed K>4, PO4>3, Mg>2    Metabolic alkalosis  Assessment & Plan  Resolved  Potential causes of nausea, patient does not have hyperkalemia or diarrhea etiology potentially associated with ESRD      Systemic lupus erythematosus with organ system  involvement (HCC)- (present on admission)  Assessment & Plan  - Continue home Plaquenil, mycophenolate, prednisone    Hyponatremia- (present on admission)  Assessment & Plan  Sodium 128 moderate hyponatremia, historically has mild to moderate with  lowest 120.  Etiology likely considerable due to malnutrition and ESRD  -Will receive D10 and NS  -Continue to monitor    Chronic respiratory failure with hypoxia (HCC)- (present on admission)  Assessment & Plan  Home oxygen requirement 5LNC, no increased requirement on admission. Hx of pulmonary HTN  -monitor oxygen requirement    Pancytopenia (HCC)- (present on admission)  Assessment & Plan  Chronic, potential relation to SLE and medications  -continue to monitor   -coagulation labs sig for elevated INR 1.47    HTN (hypertension)- (present on admission)  Assessment & Plan  We will monitor blood pressure, patient uses clonidine patch/p.o. and amlodipine 10 mg as needed  -Continue home atenolol 25 mg         VTE prophylaxis: SCDs/TEDs and pharmacologic prophylaxis contraindicated due to bleeding risk    I have performed a physical exam and reviewed and updated ROS and Plan today (6/18/2023). In review of yesterday's note (6/17/2023), there are no changes except as documented above.

## 2023-06-18 NOTE — CARE PLAN
Problem: Pain - Standard  Goal: Alleviation of pain or a reduction in pain to the patient’s comfort goal  Outcome: Not Progressing   The patient is Stable - Low risk of patient condition declining or worsening    Shift Goals  Clinical Goals: Monitor blood glucose level and maintain hemodynamics.  Patient Goals: Rest, good sleep  Family Goals: Compliance with treatment    Progress made toward(s) clinical / shift goals: Patient  agreed to restart her Na Cl 154 mEq in D10W. Blood glucose level went up from 40 to 64, will continue to monitor.  Problem: Knowledge Deficit - Standard  Goal: Patient and family/care givers will demonstrate understanding of plan of care, disease process/condition, diagnostic tests and medications  Outcome: Progressing  Note: Discussed the current treatment, plan of care and medications, patient verbalized understanding.     Problem: Risk for Bleeding  Goal: Patient will take measures to prevent bleeding and recognizes signs of bleeding that need to be reported immediately to a health care professional  Outcome: Progressing  Note: Continue to monitor patient's AV fistula site, no bleeding or s/s of infection at this time.     Problem: Skin Integrity  Goal: Skin integrity is maintained or improved  Outcome: Progressing  Note: Encourage patient to do preventative interventions like repositioning, to prevent any skin breakdown.     Problem: Fall Risk  Goal: Patient will remain free from falls  Outcome: Progressing  Note: Maintain safety precautions at all times, hourly roundings in place. Pt will remain fall free.       Patient is not progressing towards the following goals:      Problem: Pain - Standard  Goal: Alleviation of pain or a reduction in pain to the patient’s comfort goal  Outcome: Not Progressing

## 2023-06-18 NOTE — DIETARY
Calorie Count Results Day 3:  Day 4 of admit.  Lily Nicole is a 27 y.o. female with admitting DX of blood loss.    6/17 Breakfast 0 kcal, 0 g protein  6/17 Lunch 0 kcal, 0 g protein - cookie cup documented on lunch ticket. Unsure of kcals and protein for this item, but clearly does not meet nutrition needs for whole meal  6/17 Dinner 0 kcal, 0 g protein    Total x 24 hrs on 6/17 (0 Meals, 1 Snacks).    Diet order modified to Regular, with 6 small meals at breakfast today.  Pt previously on a Regular diet.  Pt met <5% of estimated kcal and protein needs.     Recommendations/Plan:  Continue current PO diet  Continue calorie count through dinner 6/18.  Continue Boost Glucose Control BID  Recommend to initiate nutrition support if pt agreeable  Document intake of all meals and supplements as % taken in ADL's to provide interdisciplinary communication across all shifts.   Record on meal ticket amount consumed of each item on meal tray. Place meal ticket in calorie count envelope posted outside of pt's room.    RD will follow.

## 2023-06-18 NOTE — PROGRESS NOTES
"Assessment was done with pt, her continuous NaCl 154 mEq in D10 W is done,as I'm about to hang a new bag,pt refused, despite education was provided about this IV. Witnessed by patient's mom. Pt's mom tried to convinced pt but pt refused. Dr. LAW Sawant notified. MD also ask if pt would allow me to check her blood glucose, Pt refused as well stating \"my hands are all poked and achy. All the treatments are not helping at all, I just wanted to go home. Pt's mom's tries to convinced pt to cooperate with her care  but pt refuses. MD is aware and will talked to pt and pt's mom.  "

## 2023-06-18 NOTE — PROGRESS NOTES
"Nephrology Daily Progress Note    Date of Service  6/18/2023    Chief Complaint  27 y.o. female with a history of uncontrolled lupus, hypertension, ESRD on hemodialysis Monday Wednesday Friday who presented 6/14/2023 with prolonged bleeding from AV fistula and failure to thrive.    Interval Problem Update  6/15-patient had additional ultrafiltration treatment yesterday with 1 L removed.  Tolerated blood transfusion yesterday.  Complains of joint pain all over, and poor appetite.  She remains adamant she would never want \"tubes sticking out of me\" for enteral nutrition support.  6/18 -patient had ultrafiltration treatment yesterday with 0.5 L removed while receiving blood transfusion.  Denies chest pain, complains of some fatigue and shortness of breath.  She has lots of family members visiting her today.    Review of Systems  Review of Systems   Constitutional:  Positive for malaise/fatigue. Negative for fever.   Respiratory:  Positive for shortness of breath.    Cardiovascular:  Negative for chest pain.   Gastrointestinal:  Negative for abdominal pain.   All other systems reviewed and are negative.       Physical Exam  Temp:  [36.7 °C (98.1 °F)-36.9 °C (98.5 °F)] 36.9 °C (98.4 °F)  Pulse:  [] 104  Resp:  [12-18] 13  BP: (136-155)/() 153/101  SpO2:  [90 %-100 %] 90 %    Physical Exam  Constitutional:       General: She is not in acute distress.     Appearance: She is cachectic. She is ill-appearing.      Interventions: Nasal cannula in place.   HENT:      Head: Normocephalic and atraumatic.      Mouth/Throat:      Mouth: Mucous membranes are moist.      Pharynx: Oropharynx is clear. No oropharyngeal exudate.   Eyes:      General: No scleral icterus.     Extraocular Movements: Extraocular movements intact.   Neck:      Trachea: No tracheal deviation.   Cardiovascular:      Rate and Rhythm: Normal rate and regular rhythm.      Heart sounds: Normal heart sounds. No murmur heard.  Pulmonary:      Effort: " Pulmonary effort is normal.      Breath sounds: No stridor. No rales.   Abdominal:      Palpations: Abdomen is soft.      Tenderness: There is no abdominal tenderness.   Musculoskeletal:         General: Normal range of motion.      Cervical back: Normal range of motion. No rigidity.      Right lower leg: No edema.      Left lower leg: No edema.   Skin:     General: Skin is warm and dry.      Comments: Taut and shiny   Neurological:      General: No focal deficit present.      Mental Status: She is alert and oriented to person, place, and time.   Psychiatric:         Mood and Affect: Mood normal.         Behavior: Behavior normal.     Dialysis access: Right upper arm AV fistula, patent bruit and thrill    Fluids    Intake/Output Summary (Last 24 hours) at 6/18/2023 1545  Last data filed at 6/17/2023 2200  Gross per 24 hour   Intake 272 ml   Output --   Net 272 ml         Laboratory  Labs reviewed, pertinent labs below.  Recent Labs     06/16/23  0915 06/17/23  0108 06/17/23  1006 06/18/23  0824   WBC 3.9* 2.1*  --  3.7*   RBC 4.09* 3.16*  --  4.17*   HEMOGLOBIN 9.6* 7.5* 6.4* 10.1*   HEMATOCRIT 30.7* 24.4* 23.5* 32.4*   MCV 75.1* 77.2*  --  77.7*   MCH 23.5* 23.7*  --  24.2*   MCHC 31.3* 30.7*  --  31.2*   RDW 60.9* 65.1*  --  63.1*   PLATELETCT 190 101*  --  107*       Recent Labs     06/16/23  0626 06/17/23  0108 06/18/23  0806   SODIUM 128* 134* 129*   POTASSIUM 4.9 3.9 4.2   CHLORIDE 89* 97 94*   CO2 30 31 29   GLUCOSE 69 372* 79   BUN 29* 15 22   CREATININE 3.31* 2.47* 3.96*   CALCIUM 8.6 8.1* 8.3*                       Imaging interpreted by radiologist. Imaging reports reviewed with pertinent findings below  IR-US GUIDED PIV    (Results Pending)   EC-ECHOCARDIOGRAM COMPLETE W/O CONT    (Results Pending)         Current Facility-Administered Medications   Medication Dose Route Frequency Provider Last Rate Last Admin    pantoprazole (Protonix) injection 40 mg  40 mg Intravenous DAILY Selam Stevenson M.D.    40 mg at 06/18/23 0902    thiamine (B-1) IVPB 100 mg in 100 mL D5W (premix)  100 mg Intravenous DAILY Selam Stevenson M.D.   Stopped at 06/18/23 0946    sodium chloride (OCEAN) 0.65 % nasal spray 2 Spray  2 Spray Nasal Q2HRS PRN Raul Anthony M.D.        dexamethasone (DECADRON) injection 0.76 mg  0.76 mg Intravenous DAILY Raul Anthony M.D.   0.76 mg at 06/18/23 1401    LORazepam (ATIVAN) injection 0.5 mg  0.5 mg Intravenous Once Raul Anthony M.D.        HYDROmorphone (Dilaudid) injection 0.5 mg  0.5 mg Intravenous Once Raul Anthony M.D.        zinc sulfate (ZINCATE) capsule 220 mg  220 mg Oral DAILY Raul Anthony M.D.        hydrALAZINE (APRESOLINE) injection 10 mg  10 mg Intravenous Q6HRS PRN Raul Anthony M.D.        dronabinol (MARINOL) capsule 2.5 mg  2.5 mg Oral BEFORE LUNCH AND DINNER Monique Magallon M.D.   2.5 mg at 06/18/23 1124    escitalopram (Lexapro) tablet 20 mg  20 mg Oral DAILY Raul Anthony M.D.        LORazepam (ATIVAN) injection 0.5 mg  0.5 mg Intravenous TID Raul Anthony M.D.   0.5 mg at 06/18/23 1149    diphenhydrAMINE (BENADRYL) injection 25 mg  25 mg Intravenous TID Raul Anthony M.D.   25 mg at 06/18/23 1149    sodium chloride 154 mEq in dextrose 10% 1,000 mL infusion   Intravenous Continuous Kt Deras M.D. 50 mL/hr at 06/17/23 2117 New Bag at 06/17/23 2117    OLANZapine (ZYPREXA) tablet 2.5 mg  2.5 mg Oral Q EVENING Raul Anthony M.D.        HYDROmorphone (Dilaudid) injection 0.5 mg  0.5 mg Intravenous Q6HRS PRN Raul Anthony M.D.   0.5 mg at 06/18/23 0941    dextrose 10 % BOLUS 25 g  25 g Intravenous Q15 MIN PRN Teddy Golden M.D. 999 mL/hr at 06/17/23 2128 25 g at 06/17/23 2128    NS (BOLUS) infusion 250 mL  250 mL Intravenous DIALYSIS PRN Navin Metz M.D.        lidocaine (XYLOCAINE) 1 % injection 1 mL  1 mL Intradermal PRN Navin Metz M.D.        epoetin (Retacrit) injection (Dialysis use only) 13,000 Units   13,000 Units Intravenous MO, WE + FR Navin Metz M.D.   13,000 Units at 06/16/23 1042    senna-docusate (PERICOLACE or SENOKOT S) 8.6-50 MG per tablet 2 Tablet  2 Tablet Oral BID Raul Anthony M.D.   2 Tablet at 06/15/23 1816    And    polyethylene glycol/lytes (MIRALAX) PACKET 1 Packet  1 Packet Oral QDAY PRN Raul Anthony M.D.        And    magnesium hydroxide (MILK OF MAGNESIA) suspension 30 mL  30 mL Oral QDAY PRN Raul Anthony M.D.        And    bisacodyl (DULCOLAX) suppository 10 mg  10 mg Rectal QDAY PRN Raul Anthony M.D.        acetaminophen (Tylenol) tablet 650 mg  650 mg Oral Q6HRS PRN Raul Anthony M.D.        ondansetron (ZOFRAN) syringe/vial injection 4 mg  4 mg Intravenous Q4HRS PRRIP Anthony M.D.   4 mg at 06/18/23 0206    ondansetron (ZOFRAN ODT) dispertab 4 mg  4 mg Oral Q4HRS PRN Raul Anthony M.D.   4 mg at 06/15/23 0836    hydroxychloroquine (Plaquenil) tablet 200 mg  200 mg Oral RONEN Anthony M.D.   200 mg at 06/14/23 2124    mycophenolate (CELLCEPT) capsule 500 mg  500 mg Oral RONEN Stevenson M.D.   500 mg at 06/14/23 2124    folic acid (FOLVITE) tablet 1 mg  1 mg Oral DAILY Selam Stevenson M.D.        therapeutic multivitamin-minerals (THERAGRAN-M) tablet 1 Tablet  1 Tablet Oral DAILY Selam Stevenson M.D.             Assessment/Plan  27 y.o. female with a history of uncontrolled lupus, hypertension, ESRD on hemodialysis Monday Wednesday Friday who presented 6/14/2023 with prolonged bleeding from AV fistula and failure to thrive.     1.  ESRD on hemodialysis Monday Wednesday Friday.  Anuric.  Patient has been on dialysis since June 2015.  There is no acute need for dialysis treatment today.  She has not listed for transplant due to chronic oxygen use.  Check renal function panel daily.  Unfortunately, she continues to lose weight, her target weight now is likely down to 39.5 kg or 39 kg.     2.  Dialysis access: Right brachiocephalic  AV fistula, patent and functional.  Continue right arm precautions.  No need for surgery intervention at this time.     3.  Anemia of chronic disease, uncontrolled, requiring large doses of Epogen and occasional blood transfusions.  Continue Retacrit 13,000 units thrice weekly with dialysis.  Check CBC daily.     4.  Severe protein calorie malnutrition, as evidenced by BMI less than 15, hypoalbuminemia, poor appetite.  Persistent.  Continue involvement of psychiatry and psychology for counseling sessions to help patient understand that she is dying of starvation.  She continues to refuse G-tube or J-tube placement.  Primary team considering PICC placement with TPN.     5.  Hyponatremia, persistent.  Due to drinking too much water and not eating enough food that contain solutes.  Restrict fluids to 1 L daily.  Limit hypotonic fluids.  Do not order salt tabs.  Encourage nutrition.  Check sodium level daily.    6.  Pancytopenia, persistent, likely due to ongoing uncontrolled lupus.  Check CBC daily.  Continue Retacrit for anemia as above.    7.  Hypoglycemia, recurrent, likely due to depleted glycogen stores in her liver, and very little body fat for gluconeogenesis.  If patient cannot maintain her own enteral nutrition, if she refuses TPN and tube feeds, I believe she is dying of starvation.    Prognosis is grim.  Patient remains quite ill.  Discussed with [facility] internal medicine attending, Dr. Selam Metz MD  Nephrology  [facility]
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
## 2023-06-19 PROBLEM — E50.9 VITAMIN A DEFICIENCY: Status: ACTIVE | Noted: 2023-01-01

## 2023-06-19 PROBLEM — E61.0 COPPER DEFICIENCY: Status: ACTIVE | Noted: 2023-01-01

## 2023-06-19 PROBLEM — I50.20 HEART FAILURE WITH REDUCED EJECTION FRACTION (HCC): Status: ACTIVE | Noted: 2023-01-01

## 2023-06-19 PROBLEM — I50.41 ACUTE COMBINED SYSTOLIC AND DIASTOLIC HEART FAILURE (HCC): Status: ACTIVE | Noted: 2023-01-01

## 2023-06-19 NOTE — CONSULTS
"  VASCULAR SURGERY SERVICE  CONSULT NOTE      Date: 6/19/2023    Referring Provider: Raul Anthony MD  Consulting Physician: Fadi Emerson MD - [facility]     -------------------------------------------------------------------------------------------------    Reason for consultation:  Bleeding right arm dialysis fistula.    HPI:  This is a 27 y.o. female multiple medical problems including end-stage renal disease on hemodialysis via a right upper arm arteriovenous fistula which has been present for about 9 years.  Per patient, patient had history of fistulograms and stent placement.  She had a fistulogram done a few weeks ago by Dr. Dotson and was told that there was no problem with the fistula.  Patient developed bleeding following dialysis and patient was admitted.  Vascular service was consulted.    Past Medical History:   Diagnosis Date    Anemia 01/17/2018    AVF (arteriovenous fistula) ([facility])     Right Arm    Chest pain     Chest tightness     Daytime sleepiness     Dialysis patient ([facility])      dialysis, M,W,F Elizabeth/Joni    Difficulty breathing     ESRD (end stage renal disease) on dialysis ([facility]) 01/17/2018    Twan Fu for breath     Heart burn     Hypertension 01/17/2018    \"Controlled with medication\"    Indigestion     Lupus ([facility])     Migraines 01/17/2018    Painful breathing     Palpitations     Seizure ([facility]) 2013    from high blood pressure, reports 1 time event    Shortness of breath     Swelling of lower extremity     Wheezing        Past Surgical History:   Procedure Laterality Date    [place] UPPER GI ENDOSCOPY,DIAGNOSIS N/A 8/26/2022    Procedure: ESOPHAGOGASTRODUODENOSCOPY;  Surgeon: Parveen Wood M.D.;  Location: SURGERY [facility];  Service: Gastroenterology    [place] UPPER GI ENDOSCOPY,CTRL BLEED N/A 8/26/2022    Procedure: GASTROSCOPY, WITH ARGON PLASMA COAGULATION;  Surgeon: Parveen Wood M.D.;  Location: SURGERY [facility];  Service: Gastroenterology    [place] " BRONCHOSCOPY,DIAGNOSTIC Bilateral 5/13/2021    Procedure: BRONCHOSCOPY, BRONCHOALVEOLAR LAVAGE;  Surgeon: William Spangler M.D.;  Location: [facility];  Service: Pulmonary    [place] UPPER GI ENDOSCOPY,DIAGNOSIS N/A 3/19/2021    Procedure: GASTROSCOPY;  Surgeon: Waylon Mcqueen M.D.;  Location: [facility];  Service: Gastroenterology    [place] UPPER GI ENDOSCOPY,CTRL BLEED  3/19/2021    Procedure: GASTROSCOPY, WITH ARGON PLASMA COAGULATION;  Surgeon: Waylon Mcqueen M.D.;  Location: [facility];  Service: Gastroenterology    [place] UPPER GI ENDOSCOPY,DIAGNOSIS  3/5/2021    Procedure: GASTROSCOPY - W/HEMOSTASIS;  Surgeon: Ej Silva M.D.;  Location: [facility];  Service: Gastroenterology    [place] COLONOSCOPY,DIAGNOSTIC  1/8/2021    Procedure: COLONOSCOPY;  Surgeon: Herbert Contreras M.D.;  Location: [facility];  Service: Gastroenterology    [place] UPPER GI ENDOSCOPY,DIAGNOSIS  1/8/2021    Procedure: GASTROSCOPY;  Surgeon: Herbert Contreras M.D.;  Location: [facility];  Service: Gastroenterology    [place] UPPER GI ENDOSCOPY,CTRL BLEED  1/8/2021    Procedure: GASTROSCOPY, WITH ARGON PLASMA COAGULATION;  Surgeon: Herbert Contreras M.D.;  Location: [facility];  Service: Gastroenterology    [place] UPPER GI ENDOSCOPY,CTRL BLEED  11/12/2020    Procedure: GASTROSCOPY, WITH ARGON PLASMA COAGULATION;  Surgeon: Gadiel Whitney M.D.;  Location: [facility];  Service: Gastroenterology    [place] UPPER GI ENDOSCOPY,BIOPSY  11/12/2020    Procedure: GASTROSCOPY, WITH BIOPSY;  Surgeon: Gadiel Whitney M.D.;  Location: [facility];  Service: Gastroenterology    GASTROSCOPY-ENDO  11/12/2020    Procedure: GASTROSCOPY;  Surgeon: Gadiel Whitney M.D.;  Location: [facility];  Service: Gastroenterology    GASTROSCOPY-ENDO  9/18/2020    Procedure: GASTROSCOPY;  Surgeon: Gadiel Whitney M.D.;  Location: [facility];  Service: Gastroenterology    GASTROSCOPY N/A 5/30/2020  "   Procedure: GASTROSCOPY;  Surgeon: Waylon Mcqueen M.D.;  Location: SURGERY [facility];  Service: Gastroenterology    GASTROSCOPY-ENDO  12/9/2019    Procedure: GASTROSCOPY;  Surgeon: Aaron Kam M.D.;  Location: SURGERY [facility];  Service: Gastroenterology    ANGIOPLASTY  01/17/2018    \"Right Arm AV-Fistulagram & Angioplastyx3\"    ULI BY LAPAROSCOPY  4/5/2010    Performed by SYED MARTELL at SURGERY [facility]    AV FISTULA CREATION Right     OTHER      renal biopsy x 3    OTHER      bone marrow biopsy       Current Facility-Administered Medications   Medication Dose Route Frequency Provider Last Rate Last Admin    HYDROmorphone (Dilaudid) injection 0.5 mg  0.5 mg Intravenous Once Raul Anthony M.D.        amLODIPine (NORVASC) tablet 10 mg  10 mg Oral Q DAY Selam Stevenson M.D.   10 mg at 06/19/23 1012    bisacodyl (DULCOLAX) suppository 10 mg  10 mg Rectal Once Kt Deras M.D.        cloNIDine (CATAPRES) 0.2 MG/24HR patch 1 Patch  1 Patch Transdermal Q7 DAYS Raul Anthony M.D.        pantoprazole (Protonix) injection 40 mg  40 mg Intravenous DAILY Selam Stevenson M.D.   40 mg at 06/19/23 1012    thiamine (B-1) IVPB 100 mg in 100 mL D5W (premix)  100 mg Intravenous DAILY Selam Stevenson M.D.   Stopped at 06/18/23 0946    sodium chloride (OCEAN) 0.65 % nasal spray 2 Spray  2 Spray Nasal Q2HRS PRN Raul Anthony M.D.        dexamethasone (DECADRON) injection 0.76 mg  0.76 mg Intravenous DAILY Raul Anthony M.D.   0.76 mg at 06/19/23 0605    Pharmacy Consult: Enteral tube insertion - review meds/change route/product selection  1 Each Other PHARMACY TO DOSE Selam Stevenson M.D.        acetaminophen (Tylenol) tablet 650 mg  650 mg Enteral Tube Q6HRS PRN Selam Stevenson M.D.        hydroxychloroquine (Plaquenil) tablet 200 mg  200 mg Enteral Tube RONEN Stevenson M.D.   200 mg at 06/19/23 1023    escitalopram (Lexapro) tablet 20 mg  " 20 mg Enteral Tube DAILY Selam Stevenson M.D.   20 mg at 06/19/23 1012    OLANZapine (ZYPREXA) tablet 2.5 mg  2.5 mg Enteral Tube Q EVENING Selam Stevenson M.D.        dronabinol (MARINOL) capsule 2.5 mg  2.5 mg Enteral Tube BEFORE LUNCH AND DINNER Selam Stevenson M.D.   2.5 mg at 06/19/23 1038    ondansetron (ZOFRAN ODT) dispertab 4 mg  4 mg Enteral Tube Q4HRS PRN Selam Stevenson M.D.        senna-docusate (PERICOLACE or SENOKOT S) 8.6-50 MG per tablet 2 Tablet  2 Tablet Enteral Tube BID Selam Stevenson M.D.        And    polyethylene glycol/lytes (MIRALAX) PACKET 1 Packet  1 Packet Enteral Tube QDAY PRN Selam Stevenson M.D.        And    magnesium hydroxide (MILK OF MAGNESIA) suspension 30 mL  30 mL Enteral Tube QDAY PRN Selam Stevenson M.D.        And    bisacodyl (DULCOLAX) suppository 10 mg  10 mg Rectal QDAY PRN Selam Stevenson M.D.        folic acid (FOLVITE) tablet 1 mg  1 mg Enteral Tube DAILY Selam Stevenson M.D.   1 mg at 06/19/23 1012    therapeutic multivitamin-minerals (THERAGRAN-M) tablet 1 Tablet  1 Tablet Enteral Tube DAILY Selam Stevenson M.D.   1 Tablet at 06/19/23 1012    zinc sulfate (ZINCATE) capsule 220 mg  220 mg Enteral Tube DAILY Selam Stevenson M.D.   220 mg at 06/19/23 1012    mycophenolate (CELLCEPT) 200 MG/ML susp 500 mg  500 mg Oral QAM Selam Stevenson M.D.   500 mg at 06/19/23 1022    hydrALAZINE (APRESOLINE) injection 10 mg  10 mg Intravenous Q6HRS PRN Raul Anthony M.D.   10 mg at 06/19/23 0836    LORazepam (ATIVAN) injection 0.5 mg  0.5 mg Intravenous TID Raul Anthony M.D.   0.5 mg at 06/19/23 1012    diphenhydrAMINE (BENADRYL) injection 25 mg  25 mg Intravenous TID Raul Anthony M.D.   25 mg at 06/18/23 2129    sodium chloride 154 mEq in dextrose 10% 1,000 mL infusion   Intravenous Continuous Kt Deras M.D. 50 mL/hr at 06/18/23 1955 New Bag at 06/18/23 1955    HYDROmorphone (Dilaudid) injection 0.5 mg  0.5 mg  Intravenous Q6HRS PRN Raul Anthony M.D.   0.5 mg at 06/19/23 0813    dextrose 10 % BOLUS 25 g  25 g Intravenous Q15 MIN PRN Teddy Golden M.D. 999 mL/hr at 06/19/23 0617 25 g at 06/19/23 0617    NS (BOLUS) infusion 250 mL  250 mL Intravenous DIALYSIS PRN Navin Metz M.D.        lidocaine (XYLOCAINE) 1 % injection 1 mL  1 mL Intradermal PRN Navin Metz M.D.        epoetin (Retacrit) injection (Dialysis use only) 13,000 Units  13,000 Units Intravenous MO, WE + FR Navin Metz M.D.   13,000 Units at 06/16/23 1042    ondansetron (ZOFRAN) syringe/vial injection 4 mg  4 mg Intravenous Q4HRS PRN Raul Anthony M.D.   4 mg at 06/19/23 0651       Social History     Socioeconomic History    Marital status: Single     Spouse name: Not on file    Number of children: Not on file    Years of education: Not on file    Highest education level: Not on file   Occupational History    Not on file   Tobacco Use    Smoking status: Never    Smokeless tobacco: Never   Vaping Use    Vaping Use: Never used   Substance and Sexual Activity    Alcohol use: No    Drug use: No    Sexual activity: Not on file   Other Topics Concern    Not on file   Social History Narrative    Not on file     Social Determinants of Health     Financial Resource Strain: Low Risk  (3/8/2021)    Overall Financial Resource Strain (CARDIA)     Difficulty of Paying Living Expenses: Not hard at all   Food Insecurity: No Food Insecurity (3/8/2021)    Hunger Vital Sign     Worried About Running Out of Food in the Last Year: Never true     Ran Out of Food in the Last Year: Never true   Transportation Needs: No Transportation Needs (3/8/2021)    PRAPARE - Transportation     Lack of Transportation (Medical): No     Lack of Transportation (Non-Medical): No   Physical Activity: Not on file   Stress: Not on file   Social Connections: Not on file   Intimate Partner Violence: Not on file   Housing Stability: Not on file       Family History   Problem Relation Age of Onset  "   Diabetes Paternal Grandmother        Allergies:  Cephalexin, Clindamycin, Hydrocodone, Maxipime [cefepime], Methylprednisolone, Metoprolol,  , Diagnostic x-ray materials, Furosemide, Hydroxyzine hcl, Insulin, Compazine, Fentanyl and related, Metoclopramide, and Tape    Review of Systems:    Constitutional: Negative for fever, chills.  Positive for weight loss.  HEENT:   Negative for hearing loss or tinnitus    Eyes:    Negative for blurred vision, double vision, or loss of vision  Respiratory:  Negative for cough, hemoptysis, or wheezing    Cardiac:  Negative for chest pain or palpitations or orthopnea  Vascular:  Negative for claudication or rest pain   Gastrointestinal: Negative for nausea, vomiting, or abdominal pain     Negative for hematochezia or melena   Genitourinary: Negative for dysuria, frequency, or hematuria   Musculoskeletal: Negative for myalgias, back pain, or joint pain  Skin:   Negative for itching or rash  Neurological:  Negative for dizziness, headaches, or tremors     Negative for speech disturbance     Negative for extremity weakness or paresthesias  Endo/Heme:  Positive for easy bruising or bleeding  Psychiatric:  Negative for depression, suicidal ideas, or hallucinations    Physical Exam:  BP (!) 145/97   Pulse (!) 106   Temp 37.3 °C (99.1 °F) (Temporal)   Resp 18   Ht 1.651 m (5' 5\")   Wt 39.3 kg (86 lb 10.3 oz)   SpO2 100%     Constitutional: Thin female, no acute distress  HEENT:  Normocephalic and atraumatic, EOMI  Neck:   Supple, no JVD  Cardiovascular: Regular rate and rhythm  Pulmonary:  Good air entry bilaterally  Abdominal:  Soft, non-tender, non-distended     Aortic impulse not widened  Musculoskeletal: No edema, no tenderness  Neurological:  CN II-XII grossly intact, no focal deficits  Skin:   Skin is warm and dry. No rash noted.  Psychiatric:  Normal mood and affect.  Upper extremity:       Pulsatile right upper arm arteriovenous fistula.  No active bleeding.  Palpable " right distal radial pulses.    Labs:  Recent Labs     06/17/23  0108 06/17/23  1006 06/18/23  0824 06/19/23  0625   WBC 2.1*  --  3.7* 8.2   RBC 3.16*  --  4.17* 5.07   HEMOGLOBIN 7.5* 6.4* 10.1* 11.8*   HEMATOCRIT 24.4* 23.5* 32.4* 39.9   MCV 77.2*  --  77.7* 78.7*   MCH 23.7*  --  24.2* 23.3*   MCHC 30.7*  --  31.2* 29.6*   RDW 65.1*  --  63.1* 66.5*   PLATELETCT 101*  --  107* 98*     Recent Labs     06/18/23  0806 06/18/23  2227 06/19/23  0625   SODIUM 129* 134* 131*   POTASSIUM 4.2 4.4 4.9   CHLORIDE 94* 95* 95*   CO2 29 27 26   GLUCOSE 79 91 91   BUN 22 26* 29*   CREATININE 3.96* 4.30* 4.50*   CALCIUM 8.3* 8.8 8.5         Recent Labs     06/18/23  0806 06/18/23  2227 06/19/23  0625   ASTSGOT 9* 10* 10*   ALTSGPT 5 <5 5   TBILIRUBIN 0.7 0.6 0.8   ALKPHOSPHAT 72 93 89   GLOBULIN 6.0* 7.3* 6.9*         Assessment:  -Recurrent right upper arm arteriovenous fistula bleeding.  -End-stage renal disease, on hemodialysis.  -Hypertension.  -History of lupus.    Plan:  I had a long discussion with patient and her family member.  Patient appears to have outflow stenosis since her fistula is pulsatile on physical exam, accounting for the problem with bleeding.  I discussed with them the option of undergoing fistulogram with possible endovascular intervention to treat the outflow stenosis and decrease the risk of recurrent bleeding.  Risks of the procedure were discussed which include, but not limited to, bleeding, infection, contrast reaction, and perioperative anesthetic complications.  I also told them that stenosis will recur and typically patient would need to undergo fistulogram and intervention every 3 to 6 months.  Patient and her family member indicated understanding and would like to proceed with fistulogram with possible intervention, fully understanding all risks.  All questions were answered.      Fadi Emerson MD  [facility]   [place] preferred or call my office  [phone]  __________________________________________________________________  Patient:Lily Nicole   MRN:[id number]   [facility]:[id number]

## 2023-06-19 NOTE — ASSESSMENT & PLAN NOTE
Echo 6/23/23 EF 45%, moderate concentric LVH, global hypokinesis, G1-2 diastolic dysfunction, mild AI, MR, TR. No changes in echo from prior after bacteremia and endocarditis episode. Likely associated to malnutrition, beriberi is a possibility. Patient is euvolemic, O2 requirements decreasing.   - Start ARB - losartan 25 mg  - Start succinate 25 mg (no allergy that mother recalls, agrees with therapy)  - Spironolactone and SLGT2i not indicated in ESRD  - Monitor weight and I and Os

## 2023-06-19 NOTE — CONSULTS
"Reason for Palliative Care Consult: Advance Care Planning    Consulted by: Selam Stevenson MD    HPI: Patient is a 28yo F with complicated medical history stemming from her lupus that has led to many co morbidities including ESRD on hemodialysis, malnutrition, migraines, pulmonary hypertension, and HFrEF. Admitted for anemia exacerbated by AV fistula bleeding requiring blood transfusion. Since admission has struggled with continued bleeding form her AV fistula as well as how to address her severe protein and calorie malnutrition. Has had many conversations with her medical team regarding tube feeds and even got to a point of having and NGT placed after being deemed not to be surgical candidate for PEG tube. Due to discomfort and anxiety caused by the nasal tube it was removed last night. She has also been seen by mental health during this stay. Palliative medicine consulted for goals of care and advanced care planning. Patient was seen at the bedside initially alone and then with her mother joining not long after we initiated our conversation. The role of palliative care was explained and they were open to a conversation.     The patient was in mild distress surrounding her current situation with the return of bleeding from her fistula and her desire to go home. When asked what was bothering her she replied \"everything\" and that she \"just wanted to go home\". She was emotional and often tearful during our conversation. She reported that she was tired of everyone telling her she was dying and that she need a feeding tube. When probed she shared that she feels if she goes home she would be able to eat what she needs to maintain proper nutrition. When asked about weight loss she denied any weight loss in the last 2-3 months but her mom did add that she has lost a lot of weight. We then asked her mom if she felt she was well enough to go home and she shared that it is hard to see her daughter in bed like this and the " poor quality of life it has led to. She did also say that she will support her daughters decisions even if that were to include going home with a hospice philosophy of care. It was clear in our discussion that any further attempt to push goals of care as it relates to her nutrition, code status, or hospice would destroy any rapport we had built. So we then moved the conversation to her symptoms and how we could help keep her more comfortable.     She shared that she is worried about constipation and has not had a BM in several days. We discussed a suppository since she was struggling with PO medications and she was agreeable to a suppository. She also shared more about her anxiety and nausea and reported that at home benadryl and ativan provided the best relief and were also able to allow her to eat. We let her know those medications were ordered and her mom offered to bring in some food for her after receiving them.     We conclude the conversation and let her know that we would check back to see how her symptoms were.     Pertinent PMHx:    Additional consults:   Nephrology  Psychiatry/psychology  GI     Assessment:  Neuro: Alert and oriented to person, place, time, and situation  Dyspnea:  on 5L NC, no conversational dyspnea    GI/Last BM: 06/13/23   Pain: Patient endorses headache    Living situation & psychosocial: Lives with mom    Spiritual: not discussed during visit, will address at follow up.     Palliative Performance Scale:  40%    Healthcare Directive Information:  Advance Directive:  none   DPOA:  none, next of kin is her mother who is present daily at the bedside    Code Status:  Full code     Plan: Patient has an unfortunate medical history related to her lupus that has made her very ill and with a poor prognosis. During our visit she made it clear her wishes are to be at home and she feels she can have an appropriate nutritional intake if she were at home. We were not in a place to push harder on  more deep conversations related to her code status, feeding tube, or a hospice philosophy of care. Our team does not believe that it comes from an issue of capacity but of some level of avoidance due to the significance of her anxiety. She stated to us that she is tired of everyone telling her she is dying and needs a feeding tube, any effort on our end to push on those topics would not have been effective today. I would not be surprised if she does not make any decisions related to a feeding tube and when it would be appropriate to go on hospice. Likely the progression of her illness will ultimately make the decisions for her. The patients mother appeared to have good insight into her illness and recognize its poor prognosis. It would be appropriate to continue a conversation with her mom and if she does go home and begins to fail giving her mom the information on hospice and to offer to her daughter the option of enrolling in hospice instead of returning to the hospital. She could also potentially be discharged home with outpatient palliative care to help address symptoms and provide a bridge to hospice.     As it relates to her symptoms it appears she has an appropriate regimen ordered for her pain and anxiety. We would recommend a suppository for constipation as the patient indicated is has been several days since her last BM and she was concerned about it.    Palliative will continue to follow and help advance the conversation as the patient allows.     Updated: Inpatient medicine team.     Total time spent in ACP discussion 45 minutes, which is separate from the time spent completing the evaluation and management visit.      I spent a total of 75 minutes reviewing medical records, direct face-to-face time with the patient and/or family, documentation and coordination of care. This is separate from the time spent on advance care planning, which is documented above.

## 2023-06-19 NOTE — PROGRESS NOTES
"Nephrology Daily Progress Note    Date of Service  6/19/2023    Chief Complaint  27 y.o. female with a history of uncontrolled lupus, hypertension, ESRD on hemodialysis Monday Wednesday Friday who presented 6/14/2023 with prolonged bleeding from AV fistula and failure to thrive.    Interval Problem Update  6/15-patient had additional ultrafiltration treatment yesterday with 1 L removed.  Tolerated blood transfusion yesterday.  Complains of joint pain all over, and poor appetite.  She remains adamant she would never want \"tubes sticking out of me\" for enteral nutrition support.  6/18 -patient had ultrafiltration treatment yesterday with 0.5 L removed while receiving blood transfusion.  Denies chest pain, complains of some fatigue and shortness of breath.  She has lots of family members visiting her today.  6/19 -pulled NGT due to discomfort  Awaiting AVF revision due to episodes of prolonged bleeding post dialysis  Will postpone HD for tomorrow    Review of Systems  Review of Systems   Constitutional:  Positive for malaise/fatigue and weight loss. Negative for chills and fever.   HENT:  Negative for congestion, hearing loss and sinus pain.    Eyes: Negative.    Respiratory:  Negative for cough, hemoptysis, shortness of breath and wheezing.    Cardiovascular:  Negative for chest pain, palpitations, orthopnea and leg swelling.   Gastrointestinal:  Positive for nausea. Negative for abdominal pain, diarrhea and vomiting.   Skin: Negative.    All other systems reviewed and are negative.       Physical Exam  Temp:  [35.9 °C (96.7 °F)-37.3 °C (99.2 °F)] 37.3 °C (99.1 °F)  Pulse:  [] 118  Resp:  [12-29] 16  BP: (124-192)/() 124/99  SpO2:  [92 %-100 %] 92 %    Physical Exam  Vitals and nursing note reviewed.   Constitutional:       General: She is not in acute distress.     Appearance: She is well-developed. She is cachectic. She is ill-appearing.   HENT:      Head: Normocephalic and atraumatic.      Nose: Nose " normal.      Mouth/Throat:      Mouth: Mucous membranes are moist.      Pharynx: Oropharynx is clear.   Eyes:      Conjunctiva/sclera: Conjunctivae normal.      Pupils: Pupils are equal, round, and reactive to light.   Neck:      Thyroid: No thyromegaly.   Cardiovascular:      Rate and Rhythm: Normal rate and regular rhythm.      Pulses: Normal pulses.      Heart sounds: Normal heart sounds.      No friction rub. No gallop.   Pulmonary:      Effort: Pulmonary effort is normal. No respiratory distress.      Breath sounds: Normal breath sounds. No wheezing, rhonchi or rales.   Abdominal:      General: Bowel sounds are normal. There is no distension.      Palpations: Abdomen is soft. There is no mass.      Tenderness: There is no abdominal tenderness. There is no guarding.   Musculoskeletal:      Cervical back: Normal range of motion and neck supple.      Right lower leg: No edema.      Left lower leg: No edema.   Skin:     General: Skin is warm and dry.      Coloration: Skin is pale.      Findings: No erythema or rash.   Neurological:      General: No focal deficit present.      Mental Status: She is alert and oriented to person, place, and time.   Psychiatric:         Mood and Affect: Mood normal.         Behavior: Behavior normal.         Thought Content: Thought content normal.         Judgment: Judgment normal.     Dialysis access: Right upper arm AV fistula, patent bruit and thrill    Fluids    Intake/Output Summary (Last 24 hours) at 6/19/2023 1311  Last data filed at 6/19/2023 0700  Gross per 24 hour   Intake 0 ml   Output 0 ml   Net 0 ml         Laboratory  Labs reviewed, pertinent labs below.  Recent Labs     06/17/23  0108 06/17/23  1006 06/18/23  0824 06/19/23  0625   WBC 2.1*  --  3.7* 8.2   RBC 3.16*  --  4.17* 5.07   HEMOGLOBIN 7.5* 6.4* 10.1* 11.8*   HEMATOCRIT 24.4* 23.5* 32.4* 39.9   MCV 77.2*  --  77.7* 78.7*   MCH 23.7*  --  24.2* 23.3*   MCHC 30.7*  --  31.2* 29.6*   RDW 65.1*  --  63.1* 66.5*    PLATELETCT 101*  --  107* 98*       Recent Labs     06/18/23  0806 06/18/23  2227 06/19/23  0625   SODIUM 129* 134* 131*   POTASSIUM 4.2 4.4 4.9   CHLORIDE 94* 95* 95*   CO2 29 27 26   GLUCOSE 79 91 91   BUN 22 26* 29*   CREATININE 3.96* 4.30* 4.50*   CALCIUM 8.3* 8.8 8.5                       Imaging interpreted by radiologist. Imaging reports reviewed with pertinent findings below  [id number] VIEW   Final Result         Feeding tube with tip projecting over the expected area of the first portion duodenum.      EC-ECHOCARDIOGRAM COMPLETE W/O CONT   Final Result      DX-ABDOMEN FOR TUBE PLACEMENT   Final Result      Feeding tube tip at the distal stomach.      These findings were discussed with SELAM STEVENSON on 6/18/2023 5:01 PM.         DX-ABDOMEN FOR TUBE PLACEMENT   Final Result         Feeding tube with tip projecting over the expected area of the first portion duodenum.      CT-ABDOMEN-PELVIS WITH    (Results Pending)   IR-EXTREMITY ANGIOGRAM-UNILATERAL RIGHT    (Results Pending)         Current Facility-Administered Medications   Medication Dose Route Frequency Provider Last Rate Last Admin    HYDROmorphone (Dilaudid) injection 0.5 mg  0.5 mg Intravenous Once Raul Anthony M.D.        amLODIPine (NORVASC) tablet 10 mg  10 mg Oral Q DAY Selam Stevenson M.D.   10 mg at 06/19/23 1012    bisacodyl (DULCOLAX) suppository 10 mg  10 mg Rectal Once Kt Deras M.D.        cloNIDine (CATAPRES) 0.2 MG/24HR patch 1 Patch  1 Patch Transdermal Q7 DAYS Raul Anthony M.D.        LORazepam (ATIVAN) injection 0.5 mg  0.5 mg Intravenous Q4HRS PRN Raul Anthony M.D.        HYDROmorphone (Dilaudid) injection 0.5 mg  0.5 mg Intravenous Q4HRS PRN Raul Anthony M.D.        fentaNYL (SUBLIMAZE) injection 12.5-50 mcg  12.5-50 mcg Intravenous PRN Fadi Emerson M.D.   25 mcg at 06/19/23 1256    midazolam (Versed) injection 0.5-2 mg  0.5-2 mg Intravenous PRN Fadi Emerson M.D.   0.5 mg at  06/19/23 1242    ondansetron (ZOFRAN) syringe/vial injection 4 mg  4 mg Intravenous PRN Fadi Emerson M.D.        HEPARIN SODIUM (PORCINE) 5000 UNIT/ML INJ SOLN             pantoprazole (Protonix) injection 40 mg  40 mg Intravenous DAILY Selam Stevenson M.D.   40 mg at 06/19/23 1012    thiamine (B-1) IVPB 100 mg in 100 mL D5W (premix)  100 mg Intravenous DAILY Selam Stevenson M.D.   Stopped at 06/18/23 0946    sodium chloride (OCEAN) 0.65 % nasal spray 2 Spray  2 Spray Nasal Q2HRS PRN Raul Anthony M.D.        dexamethasone (DECADRON) injection 0.76 mg  0.76 mg Intravenous DAILY Raul Anthony M.D.   0.76 mg at 06/19/23 0605    Pharmacy Consult: Enteral tube insertion - review meds/change route/product selection  1 Each Other PHARMACY TO DOSE Selam Stevenson M.D.        acetaminophen (Tylenol) tablet 650 mg  650 mg Enteral Tube Q6HRS PRN Selam Stevenson M.D.        hydroxychloroquine (Plaquenil) tablet 200 mg  200 mg Enteral Tube QAM Selam Stevenson M.D.   200 mg at 06/19/23 1023    escitalopram (Lexapro) tablet 20 mg  20 mg Enteral Tube DAILY Selam Stevenson M.D.   20 mg at 06/19/23 1012    OLANZapine (ZYPREXA) tablet 2.5 mg  2.5 mg Enteral Tube Q EVENING Selam Stevenson M.D.        dronabinol (MARINOL) capsule 2.5 mg  2.5 mg Enteral Tube BEFORE LUNCH AND DINNER Selam Stevenson M.D.   2.5 mg at 06/19/23 1038    ondansetron (ZOFRAN ODT) dispertab 4 mg  4 mg Enteral Tube Q4HRS PRN Selam Stevenson M.D.        senna-docusate (PERICOLACE or SENOKOT S) 8.6-50 MG per tablet 2 Tablet  2 Tablet Enteral Tube BID Selam Stevenson M.D.        And    polyethylene glycol/lytes (MIRALAX) PACKET 1 Packet  1 Packet Enteral Tube QDAY PRN Selam Stevenson M.D.        And    magnesium hydroxide (MILK OF MAGNESIA) suspension 30 mL  30 mL Enteral Tube QDAY PRN Selam Stevenson M.D.        And    bisacodyl (DULCOLAX) suppository 10 mg  10 mg Rectal QDAY PRN Selam Stevenson M.D.        folic  acid (FOLVITE) tablet 1 mg  1 mg Enteral Tube DAILY Selam Stevenson M.D.   1 mg at 06/19/23 1012    therapeutic multivitamin-minerals (THERAGRAN-M) tablet 1 Tablet  1 Tablet Enteral Tube DAILY Selam Stevenson M.D.   1 Tablet at 06/19/23 1012    zinc sulfate (ZINCATE) capsule 220 mg  220 mg Enteral Tube DAILY Selam Stevenson M.D.   220 mg at 06/19/23 1012    mycophenolate (CELLCEPT) 200 MG/ML susp 500 mg  500 mg Oral QAM Selam Stevenson M.D.   500 mg at 06/19/23 1022    hydrALAZINE (APRESOLINE) injection 10 mg  10 mg Intravenous Q6HRS PRN Ralu Anthony M.D.   10 mg at 06/19/23 0836    LORazepam (ATIVAN) injection 0.5 mg  0.5 mg Intravenous TID Raul Anthony M.D.   0.5 mg at 06/19/23 1012    diphenhydrAMINE (BENADRYL) injection 25 mg  25 mg Intravenous TID Raul Anthony M.D.   25 mg at 06/18/23 2129    sodium chloride 154 mEq in dextrose 10% 1,000 mL infusion   Intravenous Continuous Kt Deras M.D. 50 mL/hr at 06/18/23 1955 New Bag at 06/18/23 1955    dextrose 10 % BOLUS 25 g  25 g Intravenous Q15 MIN PRN Teddy Golden M.D. 999 mL/hr at 06/19/23 0617 25 g at 06/19/23 0617    NS (BOLUS) infusion 250 mL  250 mL Intravenous DIALYSIS PRN Navin Metz M.D.        lidocaine (XYLOCAINE) 1 % injection 1 mL  1 mL Intradermal PRN Navin Metz M.D.        epoetin (Retacrit) injection (Dialysis use only) 13,000 Units  13,000 Units Intravenous MO, WE + FR Navin Metz M.D.   13,000 Units at 06/16/23 1042    ondansetron (ZOFRAN) syringe/vial injection 4 mg  4 mg Intravenous Q4HRS PRN Raul Anthony M.D.   4 mg at 06/19/23 0651         Assessment/Plan  27 y.o. female with a history of uncontrolled lupus, hypertension, ESRD on hemodialysis Monday Wednesday Friday who presented 6/14/2023 with prolonged bleeding from AV fistula and failure to thrive.     1.  ESRD on hemodialysis Monday Wednesday Friday.  Anuric.  Patient has been on dialysis since June 2015.  There is no acute need for dialysis  treatment today.  She has not listed for transplant due to chronic oxygen use, general poor condiiton     2.  Dialysis access: Right brachiocephalic AV fistula, patent and functional.  Continue right arm precautions. Scheduled for AVF revision     3.  Anemia of chronic disease, uncontrolled, requiring large doses of Epogen and occasional blood transfusions.  Continue Retacrit 13,000 units thrice weekly with dialysis.  Check CBC daily.     4.  Severe protein calorie malnutrition, as evidenced by BMI less than 15, hypoalbuminemia, poor appetite.  Persistent.  Continue involvement of psychiatry and psychology for counseling sessions to help patient understand that she is dying of starvation.  She is not a candidate for G-tube, NGT pulled -could not tolerate     5.  Hyponatremia, persistent.  Due to drinking too much water and not eating enough food that contain solutes.  Restrict fluids to 1 L daily.  Limit hypotonic fluids.  Do not order salt tablets.  Increase protein intake    6.  Pancytopenia, persistent, likely due to ongoing uncontrolled lupus.  Check CBC daily.  Continue Retacrit for anemia as above.    7.  HTN: BP controlled-to monitor    Recs:  HD tomorrow  Monitor CBC, BMP  Increase protein intake  Limit free water  Prognosis is grim.  Patient remains ill.  Discussed with [facility] internal medicine attending, Dr. Selam Stevenson  Will follow

## 2023-06-19 NOTE — PROGRESS NOTES
Waffles mattress place but patient refused to have it inflated. Patient's mother also informed and insisted that waffle mattress be removed and inflated.    Waffles mattress to be removed but patient changed mind and stated that she will wait for mother before she will have mattress removed.

## 2023-06-19 NOTE — PROGRESS NOTES
Pt presents to IR 1. Patient was consented by MD at bedside, confirmed by this RN and consent at bedside. Pt transferred to IR table in supine position. Patient underwent a right upper extremity fistulogram with angioplasty by Dr. Emerson. Procedure site was marked by MD and verified using imaging guidance. Pt placed on monitor, prepped and draped in a sterile fashion. Vitals were taken every 5 minutes and remained stable during procedure (see doc flow sheet for results). CO2 waveform capnography was monitored and remained WNL throughout procedure. Report called to Tessa MARTINEZ. Pt transported by stretcher with RN to [place] [id number].

## 2023-06-19 NOTE — CARE PLAN
The patient is Watcher - Medium risk of patient condition declining or worsening    Shift Goals  Clinical Goals: Pain management, monitor labs. IV hydration  Patient Goals: Rest, pain management  Family Goals: FAHAD    Progress made toward(s) clinical / shift goals:     Patient is not progressing towards the following goals:      Problem: Pain - Standard  Goal: Alleviation of pain or a reduction in pain to the patient’s comfort goal  Outcome: Progressing  Flowsheets (Taken 6/18/2023 1644 by Adele Castellon R.N.)  Pain Rating Scale (NPRS): 10  Note: Monitor pain level and administer pain medication as needed. Monitor for side-effects     Problem: Skin Integrity  Goal: Skin integrity is maintained or improved  Outcome: Progressing  Note: Encouraged turning side to side. Waffle mattress place

## 2023-06-19 NOTE — PROGRESS NOTES
"        Palliative Medicine Brief Follow Up Note      Met with both parents bedside. Patient still appeared drowsy after prior CODE BLUE.  She continues to complain about headaches, even though she has just been medicated about 30min earlier.    Patient did eat about half of food mom brought her today. I thanked both Chikis & Fuad for helping out the medical team. This is a small but important improvement.    Both parents agreed that, despite everything that has happened today, patient seems more comfortable overall at this time than earlier. They are at least relieved patient is less stressed right now.        SLP has been a long road.  Patient was diagnosed about 12-13 years ago, and unfortunately got on dialysis about 3 years into it.  This has weighed on both parents very much... something they have both found extremely unfair.  They feel fortunate at the very least that patient's 21yo sister is of good health, and SLP does NOT run in the family.      They also feel fortunate that patient has been able to tolerate dialysis without issues so far.  They understand \"without dialysis that's it,\" in Fuad's words.        I did briefly discussed code status with both parents, while patient gave permission for her parents to discuss some care decisions with me, as she was trying to rest.  I shared my professional concern that, if for whatever reason patient was in cardiac arrest and CPR/ACLS had to be performed, patient's 80-odd-pound frame likely will not be able to sustain the resuscitation... in fact I worry CPR/ACLS may cause more harm than good in her difficult situation.    Both Chikis and Fuad expressed strong agreement with my assessment. They understand the risk... and hope that patient may eventually regain some weight to make CPR/ACLS an option again.... But until then, both parents agreed with a modified code status: DNAR, I-OK.  Code status modified per our discussion.      I offered patient some " snacks or drinks I can find, if it's something she wants.  She just wanted to sleep and rest. I informed both parents that, at this point any calorie is good, and I'm happy to help find whatever in this hospital to help wet her appetite if necessary.  Both parents seemed to appreciate my gesture.            DO Yandel MEI (TIM)[facility]   [id number] [place]  [id number]  P: [phone]  F: [phone]  C: [phone]

## 2023-06-19 NOTE — CODE DOCUMENTATION
Resident notified Vascular Surgery of bleeding from fistula. Vascular gave telephone orders for Protamine.

## 2023-06-19 NOTE — OR SURGEON
VASCULAR SURGERY IMMEDIATE POST OP NOTE       PreOp Diagnosis: Right upper extremity arteriovenous fistula bleeding secondary to venous outflow stenosis.      PostOp Diagnosis: Same.      Procedure(s):  Right arm dialysis fistulogram and venoplasty of central venous stenosis using 10 x 40 mm angioplasty balloon.    Surgeon(s):  Fadi Emerson M.D.    Type of Anesthesia: Intravenous sedation with fentanyl and Versed for 30 minutes and local anesthesia with 3 mm 1% lidocaine solution.    IR staff:    Specimens removed if any: None.    Estimated Blood Loss: 5 mL.    Contrast used: 15 mL Visipaque.    Findings: Central venous stenosis.  Old stent placed across innominate vein lumen.    Complications: None.    Dictated, #[id number].        6/19/2023 1:20 PM Fadi Emerson M.D.

## 2023-06-19 NOTE — OP REPORT
VASCULAR SURGERY OPERATIVE REPORT       DATE OF SERVICE:  06/19/2023     SURGEON:  Fadi Emerson MD     ANESTHESIA:  Intravenous sedation with fentanyl and Versed for 30 minutes and   local anesthesia with 3 mL of 1% lidocaine solution.     PREOPERATIVE DIAGNOSIS:  Right upper extremity arteriovenous fistula bleeding   secondary to venous outflow stenosis.     POSTOPERATIVE DIAGNOSIS:  Right upper extremity arteriovenous fistula bleeding   secondary to venous outflow stenosis.     PROCEDURES:  1.  Percutaneous cannulation of right upper extremity arteriovenous fistula   under ultrasound guidance.  2.  Right upper extremity dialysis fistulogram and central venogram.  3.  Percutaneous, transluminal venoplasty of central venous stenosis using   10x40 mm angioplasty balloon.     INDICATIONS FOR PROCEDURE:  This is a pleasant 27-year-old female with   multiple medical problems including end-stage renal disease, on hemodialysis   via a right upper arm arteriovenous fistula.  She has history of central   venous stenosis requiring stenting performed by another surgeon.  She   presented with recurrent right upper extremity fistula bleeding.  Physical   exam was consistent with a central venous recurrent stenosis.  Discussion was   made with the patient and her family member.  They would like to undergo   angiogram with possible endovascular intervention, fully understanding all   risks.     DESCRIPTION OF PROCEDURE:  Informed consent was obtained.  The patient was   taken to the interventional radiology suite and was placed in the supine   position.  A timeout procedure was done.  Intravenous sedation was performed   with fentanyl and Versed.  The patient was closely monitored.     Next, her right upper extremity was sterilely prepped and draped in the normal   fashion.  Duplex evaluation of the fistula was performed.  It was found to   have a segment of calcification above the anastomosis.  Under ultrasound    guidance, the fistula was percutaneously cannulated using micropuncture kit,   after the skin and subcutaneous tissue were anesthetized with 3 mL of 1%   lidocaine solution.  The microcatheter was removed and replaced with a   6-[place]ian sheath.  A dialysis fistulogram and central venogram were performed.    Next, patient was given heparin 4000 units intravenously.  After the heparin   was allowed to circulate systemically for 3 minutes, over the guidewire,   percutaneous, transluminal venoplasty of the mid right subclavian stenosis at the   junction with the stent as well as distal part of the stent in the innominate   vein was performed using 10x40 mm angioplasty balloon.  Followup venogram was   obtained and show restoration of luminal patency.  At this time, the fistula   was no longer pulsatile.     The 6-[place]ian sheath was removed.  Hemostasis was achieved by placing a   pursestring at the puncture site using 3-0 nylon suture.  Sterile dressing was   applied.     IMPRESSION: Patent right upper extremity arteriovenous fistula with a segment   of calcification above the anastomosis.     There are 2 stents placed in the central venous system extending from the mid   subclavian vein into the innominate vein.  There was moderate stenosis at the   junction of the stent and the mid subclavian vein.  This was successfully   treated with a percutaneous, transluminal venoplasty using 10x40 mm   angioplasty balloon.  The distal part of the stent was placed across the lumen   of the innominate vein.  Angioplasty of this area with the balloon directed   toward the lumen of the innominate vein was also performed using 10x40 mm   angioplasty balloon.     ESTIMATED BLOOD LOSS:  5 mL.     CONTRAST USED:  15 mL Visipaque.     The patient was then awakened and taken to recovery area in stable condition   with great thrills over the fistula and intact neurovascular examination of   the right  bony.        ______________________________  MD MINOR Sandoval/SAMEERA/DIA    DD:  06/19/2023 13:33  DT:  06/19/2023 14:12    Job#:  [id number]

## 2023-06-19 NOTE — PROGRESS NOTES
Fistula bleeding; Gauze place on top of wound, wrapped with pop wrap and secured with tape.     Dr. Meraz updated at around 0800

## 2023-06-19 NOTE — PROGRESS NOTES
[facility] Internal Medicine Daily Progress Note    Date of Service  6/19/2023    [facility] Team: [facility] IM [facility] Team   Attending: Selam Stevenson M.d.  Senior Resident: Dr. Kt Deras  Intern:  Dr. Raul Anthony  Contact Number: [phone]    Chief Complaint  Lily Nicole is a 27 y.o. female admitted 6/14/2023 with Acute blood loss from right arm AV fistula    Hospital Course  Lily Nicole is a 27 y.o. female with significant medical history of SLE glomerulonephritis syndrome with  ESRD on hemodialysis (on plaquenil, mycophenolate, prednisone), malnutrition, seizure associated with hypoglycemia, HTN, constipation with chronic opioid use, migraines, pulmonary hypertension on 5LNC who presented 6/14/2023 with acute blood loss anemia from her AV fistula (placed 11-12 yrs ago) while at the dialysis center with hemoglobin 6.1. Patient transfused 1 uPRBC irradiated with dialysis and benadryl 50mg IV due to history of shortness of breath associated with transfusions. Hemoglobin stabilized at 9.1.   Her severe malnutrition status has been discussed and further workup for nausea leading to decreased appetite is pending with upper GI study. GI and General Surgery have been called regarding placing a G tube or PEG, at this time recs made for further studies and possible NG tube placement given her current condition she has a high risk of bleeding and difficulty healing.   Patient is also having hypoglycemic episodes associated with low appetite.    Palliative care recs for referral to outpatient/home pallaitive care team on discharge.     6/19 bleeding from AV fistula, Hgb holding 11.8. Vascular surgery consulted and patient underwent right fistulogram for treatment of central venous stenosis with venoplasty. Follow up with Dr. Dotson outpatient in 3-6 months to evaluate for central venous stenosis recurrence.     Interval Problem Update    Approx 14:30 Code Blue called for Rapid Response due to right AV  fistula puncture site bleed. Patient found with bleed by phlebotomist. Nursing held pressure and Dr. Emerson Vascular Surgeon was called, recs to hold light pressure on puncture site and give protamine 30mg IV. Protamine given 14:58, bleeding stopped. Per Dr. Emerson evaluation of AV fistula, cleared for use.     Overnight patient did not tolerate having feeding in, tube was removed. This am patient had active bleeding from AV fistula with elevated BP. Vascular surgery consulted and patient taken for fistulogram.   Palliative care also spoke to her today.   Per patient, feels very nauseous and had billious vomitus x 4. Also constipated.  -suppository ordered  -BP control w/addition of home clonidine patch + hydralazine prn  -CT abd/pelvis with contrast and premedication with benadryl as already she is getting steroids - on hold   -Dialysis today post fistulogram   -H/H recheck pending    I have discussed this patient's plan of care and discharge plan at IDT rounds today with Case Management, Nursing, Nursing leadership, and other members of the IDT team.    Consultants/Specialty  general surgery, GI, nephrology, psychiatry, and psychology and palliative care    Code Status  Full Code    Disposition  The patient is not medically cleared for discharge to home or a post-acute facility.      I have placed the appropriate orders for post-discharge needs.    Review of Systems  Review of Systems   Constitutional:  Positive for malaise/fatigue and weight loss.   Gastrointestinal:  Positive for nausea and vomiting.   Genitourinary:         Anuric   Musculoskeletal:  Positive for back pain, joint pain, myalgias and neck pain.   Neurological:  Positive for weakness and headaches.   Psychiatric/Behavioral:  The patient is nervous/anxious.         Physical Exam  Temp:  [35.9 °C (96.7 °F)-37.3 °C (99.2 °F)] 37.3 °C (99.1 °F)  Pulse:  [] 118  Resp:  [12-29] 16  BP: (124-192)/() 124/99  SpO2:  [92 %-100 %] 92  %    Physical Exam  Constitutional:       General: She is in acute distress.      Appearance: She is ill-appearing. She is not toxic-appearing or diaphoretic.      Comments: Cachectic with temporal wasting, fatigued. More awake this afternoon with family around   HENT:      Head: Normocephalic and atraumatic.      Comments: Small nosebleed afternoon     Right Ear: External ear normal.      Left Ear: External ear normal.      Nose: Nose normal.      Mouth/Throat:      Mouth: Mucous membranes are dry.   Eyes:      General: No scleral icterus.        Right eye: No discharge.         Left eye: No discharge.      Extraocular Movements: Extraocular movements intact.      Conjunctiva/sclera: Conjunctivae normal.   Cardiovascular:      Rate and Rhythm: Normal rate and regular rhythm.      Pulses: Normal pulses.      Heart sounds: Normal heart sounds. No murmur heard.  Pulmonary:      Effort: Pulmonary effort is normal. No respiratory distress.      Breath sounds: Normal breath sounds.   Abdominal:      General: Abdomen is flat. Bowel sounds are normal. There is no distension.      Palpations: Abdomen is soft.      Tenderness: There is no abdominal tenderness.   Musculoskeletal:         General: Normal range of motion.      Cervical back: Normal range of motion.      Right lower leg: No edema.      Left lower leg: No edema.      Comments: Right arm with a/v fistula with active carolann bleeding this am   Skin:     General: Skin is warm and dry.      Comments: Skin indicative of scleroderma of hands and face   Neurological:      General: No focal deficit present.      Mental Status: She is alert and oriented to person, place, and time.      Motor: Weakness present.   Psychiatric:         Mood and Affect: Mood normal.         Behavior: Behavior normal.         Thought Content: Thought content normal.         Judgment: Judgment normal.         Fluids    Intake/Output Summary (Last 24 hours) at 6/19/2023 1356  Last data filed at  6/19/2023 0700  Gross per 24 hour   Intake 0 ml   Output 0 ml   Net 0 ml       Laboratory  Recent Labs     06/17/23  0108 06/17/23  1006 06/18/23  0824 06/19/23  0625   WBC 2.1*  --  3.7* 8.2   RBC 3.16*  --  4.17* 5.07   HEMOGLOBIN 7.5* 6.4* 10.1* 11.8*   HEMATOCRIT 24.4* 23.5* 32.4* 39.9   MCV 77.2*  --  77.7* 78.7*   MCH 23.7*  --  24.2* 23.3*   MCHC 30.7*  --  31.2* 29.6*   RDW 65.1*  --  63.1* 66.5*   PLATELETCT 101*  --  107* 98*     Recent Labs     06/18/23  0806 06/18/23  2227 06/19/23  0625   SODIUM 129* 134* 131*   POTASSIUM 4.2 4.4 4.9   CHLORIDE 94* 95* 95*   CO2 29 27 26   GLUCOSE 79 91 91   BUN 22 26* 29*   CREATININE 3.96* 4.30* 4.50*   CALCIUM 8.3* 8.8 8.5                   Imaging  [id number] VIEW   Final Result         Feeding tube with tip projecting over the expected area of the first portion duodenum.      EC-ECHOCARDIOGRAM COMPLETE W/O CONT   Final Result      DX-ABDOMEN FOR TUBE PLACEMENT   Final Result      Feeding tube tip at the distal stomach.      These findings were discussed with MARLENE BRENNER on 6/18/2023 5:01 PM.         DX-ABDOMEN FOR TUBE PLACEMENT   Final Result         Feeding tube with tip projecting over the expected area of the first portion duodenum.      CT-ABDOMEN-PELVIS WITH    (Results Pending)   IR-EXTREMITY ANGIOGRAM-UNILATERAL RIGHT    (Results Pending)        Assessment/Plan  Problem Representation:    * Anemia associated with acute blood loss- (present on admission)  Assessment & Plan  Presents to the ED with hemoglobin 6.1, blood loss from AV fistula site at dialysis center.  Per patient she had blood loss at dialysis last Friday for which she came to the ED and required a stitch to be placed.  Also had blood loss from AV fistula site at dialysis on Monday.  History of having transfusion reactions requiring Benadryl and getting a blood transfusion with dialysis.  Anemia likely due to acute blood loss from AV fistula, coagulopathy labs show elevated INR 1.34  with TEG findings largely normal with no indication for FFP or cryo.  -Nephrology consulted, appreciate recs: plan to continue dialysis routine MWF  -Benadryl 25 mg IV for dialysis  -Epistaxis 6/16 with Hgb of 6.4, transfused 1upRBC with dialysis  -AV fistula bleed 6/19 - see AV fistula  -qam H&H     Severe protein-calorie malnutrition (HCC)- (present on admission)  Assessment & Plan  Patient does not want NG tube but has changed her mind regarding feeding tube. High surgical candidate therefore further studies for cause of nausea pending.  -Nutrition consult  -Vitamins, copper low - replete with tube feeds if possible, multivitamin po  -Low Zn replete with 50mg daily  -prealbumin 5  -Boost with meals  -Calorie count  -Gi consulted, appreciate recs for marinol. Working up adrenal insufficiency  -Psychiatry consulted recs made for scheduled ativan and benadryl, remeron switch to lexapro 20 mg, zyprexa continue at current dose.  -Psychology consult placed  -palliative care consult placed   -dobhoff placed 6/19, couldn't tolerate overnight and removed  - Echo EF 45%, moderate concentric LVH, global hypokinesis, G1 diastolic dysfunction, mild aortic insufficiency     HTN (hypertension)- (present on admission)  Assessment & Plan  We will monitor blood pressure, patient uses clonidine patch/p.o. and amlodipine 10 mg as needed  -Continue home atenolol 25 mg  -cont home clonidine patch  -hydralazine prn    Arteriovenous fistula, acquired (HCC)- (present on admission)  Assessment & Plan  Coagulation studies show slight elevation INR 1.47, TEG no indication for FFT/cryo.  -Bleeding recurred 6/19 Vascular surgery Dr. Emerson took patient for right fistulogram for treatment of central venous stenosis with venoplasty.  -Outpatient followup with vascular surgeon  3-6 months  -fistula cleared for dialysis    Copper deficiency  Assessment & Plan  Nutrition input on diet    Vitamin A deficiency  Assessment & Plan  Nutrition  input on diet    Heart failure with reduced ejection fraction (HCC)  Assessment & Plan   Echo EF 45%, moderate concentric LVH, global hypokinesis, G1 diastolic dysfunction, mild aortic insufficiency   -look at GDMT, patient currently having issues intaking po meds. controlling nausea    Fe deficiency anemia  Assessment & Plan  Fe panel with Fe 59, TIB and Fe% uncalculable, unsat Fe <17 likely due to ESRD and malnutrition  -epoetin with dialysis      Subclinical hypothyroidism  Assessment & Plan  T4 0.9 at lower end of range, TSH 9.130. Likely related to malnutrition rather than indicative of hypothyroidism.  Holding off on synthroid as given patient's malnutrition will not want to increase metabolism.     Disorder of electrolytes  Assessment & Plan  Post dialysis session today  -monitor and replete as needed K>4, PO4>3, Mg>2    Metabolic alkalosis  Assessment & Plan  Resolved  Potential causes of nausea, patient does not have hyperkalemia or diarrhea etiology potentially associated with ESRD      Systemic lupus erythematosus with organ system involvement (HCC)- (present on admission)  Assessment & Plan  - Continue home Plaquenil, mycophenolate, prednisone    Hyponatremia- (present on admission)  Assessment & Plan  Sodium 128 moderate hyponatremia, historically has mild to moderate with  lowest 120.  Etiology likely considerable due to malnutrition and ESRD  -Will receive D10 and NS  -1L fluid restriction per nephrology  -Continue to monitor    Chronic respiratory failure with hypoxia (HCC)- (present on admission)  Assessment & Plan  Home oxygen requirement 5LNC, no increased requirement on admission. Hx of pulmonary HTN  -monitor oxygen requirement    Pancytopenia (HCC)- (present on admission)  Assessment & Plan  Chronic, potential relation to SLE and medications  -continue to monitor   -coagulation labs sig for elevated INR 1.47         VTE prophylaxis: SCDs/TEDs and pharmacologic prophylaxis contraindicated due to  bleeding risk    I have performed a physical exam and reviewed and updated ROS and Plan today (6/19/2023). In review of yesterday's note (6/18/2023), there are no changes except as documented above.

## 2023-06-19 NOTE — DIETARY
Calorie Count Results Day 4:  Day 4 of admit.  Lily Nicole is a 27 y.o. female with admitting DX of blood loss.     No meal records in calorie count envelope.  Calorie count ended with dinner last night.  Pt continues on a Regular, 1000 mL fluid restriction diet, with oral nutrition supplements.  Consult received today for tube feeding.  NGT was placed yesterday, removed in the evening d/t discomfort and anxiety by patient.   Discussed nutrition POC with RN this morning. The goal was to contain bleeding at fistula site, no immediate plans to replace NGT.  Percutaneous cannulation of right upper extremity arteriovenous fistula under ultrasound guidance, right upper extremity dialysis fistulogram and central venogram, percutaneous, transluminal venoplasty of central venous stenosis using 10x40 mm angioplasty balloon today.  GI signed off on pt today.  Palliative MD met with pt today. Nutrition GOC were discussed. Pt to remain on PO diet with medications added to help alleviate nausea and anxiety.  Tube feeding protocol was discontinued.     Recommendations/Plan:  Continue current PO diet.  Family encouraged to bring pt's preferred foods.  Consider Magic Cup instead of Boost Glucose Control while pt is on a fluid restriction.  Document intake of all meals and supplements as % taken in ADL's to provide interdisciplinary communication across all shifts.      RD will continue to follow.

## 2023-06-19 NOTE — DISCHARGE PLANNING
TCN following. HTH/SCP  TCN Hannah RN chart review completed.   Collaborated with   Jody Phillips).  Per Chart review:  -Per most recent DC planning notes by LEXIS Vera on 6/19/2023 11:46 AM-  indicated pt  has a Palliative Consult and that pt is not open to hospice conversation as she wants to go home.  -Pt on Hemodialysis and has ongoing need for medical management.   - Palliative Medicine consulted for Advance Care Planning. Kindly refer to Dr Darvin Beltran M.D. notes on 6/19/2023 10:01 AM.     Awaiting medical clearance.  Will await Provider's Discharge disposition recommendations.  TCN will continue to follow and collaborate with Hospital Case Management discharge planning team as additional post acute needs arise. Thank you.      Completed:  Palliative Medicine Consult 6/19/2023   Choice obtained: NONE  [facility] referral not sent as pt declined [facility] services 6/15/2023

## 2023-06-19 NOTE — CODE DOCUMENTATION
Dextrose being given for hypoglycemia when rapid team arrived to unit. Manual pressure applied to AV fistula site by Rapid Team.

## 2023-06-19 NOTE — PROGRESS NOTES
Updated by primary that patient had NG tube placed to duodenum yesterday without difficulty.     No further interventions from acute GI team. GI to sign off. Please reconsult for any further questions or concerns.

## 2023-06-19 NOTE — CONSULTS
"  [facility] PSYCHIATRIC FOLLOW-UP:(established)  *Reason for admission:  evaluation of bleeding from her fistula site.  Patient received dialysis as normally scheduled today but after her dialysis fistula site continued to bleed        Legal Status  vol           S:  she is very anxious this am. The ativan is scheduled to be given in 1 hour. She is getting some dilaudid. Pt \"just wants to go home\" and says she eats there but reflected that her level of eating was such that she is severely malnutrition. She did try the NG tube yesterday but said it felt very uncomfortable and it was removed. Brought up the possibility of hospice which could be done a home and asked what her thoughts were, she did not reply.  Asked why she has not been taking the antidepressant as it may be helpful: \"I don't know\". Feels the drobinol makes her ill as well.    O: Medical ROS (as pertinent):                      *Psychiatric Examination:   Vitals:   Vitals:    06/19/23 0028 06/19/23 0400 06/19/23 0649 06/19/23 0830   BP: (!) 163/127 (!) 180/120 (!) 192/120 (!) 173/100   Pulse: (!) 113 (!) 108  (!) 102   Resp: 16 20  18   Temp: 36.3 °C (97.4 °F) 37.3 °C (99.2 °F)  36.8 °C (98.2 °F)   TempSrc: Temporal Temporal  Temporal   SpO2: 92% 97%  97%   Weight:       Height:         General Appearance:  poor eye contact.   Abnormal Movements: none   Gait and Posture: not observed  Speech: minimal  Thought Process: slow rate  Associations:   linear  Abnormal or Psychotic Thoughts: none  Judgement and Insight: improved  Orientation: grossly intact  Recent and Remote Memory: grossly intact  Attention Span and Concentration: intact  Language:fluent  Fund of Knowledge: not tested  Mood and Affect: depressed and anxious  SI/HI:  suicidal - no . She appears ambivalent about what else to do.        Current Medications:  Scheduled Medications   Medication Dose Frequency    HYDROmorphone  0.5 mg Once    amLODIPine  10 mg Q DAY    " pantoprazole  40 mg DAILY    thiamine  100 mg DAILY    dexamethasone  0.76 mg DAILY    Pharmacy  1 Each PHARMACY TO DOSE    hydroxychloroquine  200 mg QAM    escitalopram  20 mg DAILY    OLANZapine  2.5 mg Q EVENING    dronabinol  2.5 mg BEFORE LUNCH AND DINNER    senna-docusate  2 Tablet BID    folic acid  1 mg DAILY    therapeutic multivitamin-minerals  1 Tablet DAILY    zinc sulfate  220 mg DAILY    mycophenolate  500 mg QAM    LORazepam  0.5 mg TID    diphenhydrAMINE  25 mg TID    epoetin  13,000 Units MO, WE + FR          *ASSESSMENT/RECOMENDATIONS:  1. Anxiety disorder uns      -R/O adjustment disorder with anxiety      -R/O social anxiety  2 Depressive disorder [facility]     Medical:   -HTN  -hx of nida antwan tear  -severe protein calorie malnutrition  -LUPUS and on chronic steroid regimen: can effect her mood: plaquenil, mycophenolate  -chronic respiratory failure  -hypothyroidism  -anemia with transfusion: it will effect her mood  -ESRD and HD  -pancytopenia  -Heart failure with reduced ejection fracture: not in acute exacerbation     Recommendations:  Legal Status:  vol      Discussed/voalted:  GETACHEW Stevenson MD     Medication and Other Recommendations: final orders as per Tx Tm  No changes today   2   PEG and similar not an option per GI as she is too high risk for surgery. TPN being discussed.   3    Palliative will be seeing her  4    Prognosis is very poor.     Will continue to follow with you.            Discharge recommendations: per tx tm      If released from [facility]: Discharge Instructions:  -Reviewed safety plan: 911, ER, PCM, MHC, Suicide crisis line  -Please assist with outpatient Psychiatric/substance use follow up appointments at discharge once medically cleared.

## 2023-06-19 NOTE — THERAPY
06/19/23 1430   Interdisciplinary Plan of Care Collaboration   Collaboration Comments Hold PT eval this AM, per nsg, unable to control bleeding at her fistula.

## 2023-06-19 NOTE — DISCHARGE PLANNING
Case Management Discharge Planning    Admission Date: 6/14/2023  GMLOS: 4.9  ALOS: 5    6-Clicks ADL Score: 15  6-Clicks Mobility Score: 12  PT and/or OT Eval ordered: Yes  Post-acute Referrals Ordered: No  Post-acute Choice Obtained: No  Has referral(s) been sent to post-acute provider:  No      Anticipated Discharge Dispo: Discharge Disposition: Discharged to home/self care (01)    DME Needed: No    Action(s) Taken: OTHER    Patient discussed during IDT rounds. Patient is not medically clear at this time.     Patient had a palliative consult. Patient was not open to having a hospice conversation stating she wants to go home. Per palliative, it may be appropriate to provide patient's mother with information on hospice if patient discharges home and begins to fail. Another option is discharging home with outpatient palliative care.     Escalations Completed: None    Medically Clear: No    Next Steps: LSW to follow up with patient and medical team regarding discharge needs and barriers.     Barriers to Discharge: Medical clearance

## 2023-06-19 NOTE — PROGRESS NOTES
Ng tube placed, patient did not tolerated well, pre medicated, spoke to physician, gave extra dose of dilaudid and ativan, HR was in the 130's, took patient 45 mins to calm down after placement, patient is resting comfortably now VSS, instructed to advance ng to original placement which was at 75, patient tolerated well, slept thru, ordered new xray for placement.

## 2023-06-19 NOTE — PROGRESS NOTES
Telemetry Report:    Rhythm: ST  Heart Rate: 103  Ectopy:    LA: 0.16 sec  QRS: 0.09 sec  QT: 0.34 sec                      Per telemetry room monitor

## 2023-06-19 NOTE — PROGRESS NOTES
VASCULAR SURGERY PROGRESS NOTE       Called for bleeding at access puncture site.  Bleeding has stopped with gentle finger pressure applied by RN.  On physical exam, there is no active bleeding.  Fistula have great thrills.  Palpable distal right radial pulses.    Patient was instructed to keep right arm straight and elevated for 2 hours.    Still OK to use fistula for dialysis tomorrow.    Discussed with RN and Dr. Anthony.    Vascular service will sign off.  Please call for questions/concerns.

## 2023-06-19 NOTE — PROGRESS NOTES
VASCULAR SURGERY PROGRESS NOTE       Fistulogram showed central venous stenosis which was successfully treated with venoplasty.    Okay to use fistula for dialysis.    Patient will need to follow-up with Dr. Dotson as outpatient since the central venous stenosis will recur, typically in 3 to 6 months time.    Vascular service will sign off.  Please call for questions or concerns.

## 2023-06-20 PROBLEM — R50.9 FEVER: Status: ACTIVE | Noted: 2023-01-01

## 2023-06-20 NOTE — PROGRESS NOTES
Patient refusing oral and suppository tylenol at this time despite being educated that the patients high temperature puts her at risk for seizures.

## 2023-06-20 NOTE — DISCHARGE PLANNING
Case Management Discharge Planning    Admission Date: 6/14/2023  GMLOS: 4.9  ALOS: 6    6-Clicks ADL Score: 15  6-Clicks Mobility Score: 12  PT and/or OT Eval ordered: Yes  Post-acute Referrals Ordered: No  Post-acute Choice Obtained: no  Has referral(s) been sent to post-acute provider:  no      Anticipated Discharge Dispo: Discharge Disposition: Discharged to home/self care (01)  Pt wants to go home. Confirmed with mom that she does not want hospice for daughter (pt).    DME Needed: none at this time    Action(s) Taken: Discussed with IDT who said that pt will have a CT of chest and abd. Will be dialyzed. Per MD, pt is not medically clear.    Addendum:  Met with mom at 1700 and she is requesting for a letter from [facility] or [facility] to assist grandparents to come here from [place]. Email sent to leaders.     Pt is very weak and cachectic but mom declined hospice care. She wants to take pt home and take care of her. Per RN, pt has not eaten anything and has refused TF.     Escalations Completed: n/a    Medically Clear: no    Next Steps: follow up with IDT tomorrow    Barriers to Discharge: Medical clearance    Is the patient up for discharge tomorrow: no    Addendum: 06/22-[facility] letter found in  [facility] and forwarded to unit SW.

## 2023-06-20 NOTE — PROGRESS NOTES
[facility] Internal Medicine Daily Progress Note    Date of Service  6/20/2023    [facility] Team: R IM Orange Team   Attending: Joel Harrison M.d.  Senior Resident: Dr. Kt Deras  Intern:  Dr. Raul Anthony  Contact Number: [phone]    Chief Complaint  Lily Nicole is a 27 y.o. female admitted 6/14/2023 with Acute blood loss from right arm AV fistula    Hospital Course  Lily Nicole is a 27 y.o. female with significant medical history of SLE glomerulonephritis syndrome with  ESRD on hemodialysis (on plaquenil, mycophenolate, prednisone), malnutrition, seizure associated with hypoglycemia, HTN, constipation with chronic opioid use, migraines, pulmonary hypertension on 5LNC who presented 6/14/2023 with acute blood loss anemia from her AV fistula (placed 11-12 yrs ago) while at the dialysis center with hemoglobin 6.1. Patient transfused 1 uPRBC irradiated with dialysis and benadryl 50mg IV due to history of shortness of breath associated with transfusions. Hemoglobin stabilized at 9.1.   Her severe malnutrition status has been discussed and further workup for nausea leading to decreased appetite is pending with upper GI study. GI and General Surgery have been called regarding placing a G tube or PEG, at this time recs made for further studies and possible NG tube placement given her current condition she has a high risk of bleeding and difficulty healing.   Patient is also having hypoglycemic episodes associated with low appetite.    Palliative care recs for referral to outpatient/home pallaitive care team on discharge.     6/19 bleeding from AV fistula, Hgb holding 11.8. Vascular surgery consulted and patient underwent right fistulogram for treatment of central venous stenosis with venoplasty. Follow up with Dr. Dotson outpatient in 3-6 months to evaluate for central venous stenosis recurrence.     Interval Problem Update  No acute events overnight  This am patient was fatigued and spiking fevers  since 6/19 1905 101.8F with 06/20 101.2F. Pain everywhere mostly in right shoulder, able to move arm w/o redness or swelling noted to the site.   -obtaining second IV access site   -procal elevated, monitoring for continued fever and may need abx    I have discussed this patient's plan of care and discharge plan at IDT rounds today with Case Management, Nursing, Nursing leadership, and other members of the IDT team.    Consultants/Specialty  general surgery, GI, nephrology, psychiatry, and psychology and palliative care    Code Status  DNAR, I OK    Disposition  The patient is not medically cleared for discharge to home or a post-acute facility.      I have placed the appropriate orders for post-discharge needs.    Review of Systems  Review of Systems   Constitutional:  Positive for malaise/fatigue and weight loss.   Gastrointestinal:  Positive for nausea and vomiting.   Genitourinary:         Anuric   Musculoskeletal:  Positive for back pain, joint pain, myalgias and neck pain.   Neurological:  Positive for weakness and headaches.   Psychiatric/Behavioral:  The patient is nervous/anxious.         Physical Exam  Temp:  [37.3 °C (99.2 °F)-38.8 °C (101.8 °F)] 37.9 °C (100.2 °F)  Pulse:  [106-136] 134  Resp:  [16-20] 17  BP: (147-166)/() 150/92  SpO2:  [92 %-100 %] 96 %    Physical Exam  Constitutional:       General: She is not in acute distress.     Appearance: She is ill-appearing. She is not toxic-appearing or diaphoretic.      Comments: Cachectic with temporal wasting, fatigued.   HENT:      Head: Normocephalic and atraumatic.      Right Ear: External ear normal.      Left Ear: External ear normal.      Nose: Nose normal.      Mouth/Throat:      Mouth: Mucous membranes are dry.   Eyes:      General: No scleral icterus.        Right eye: No discharge.         Left eye: No discharge.      Extraocular Movements: Extraocular movements intact.      Conjunctiva/sclera: Conjunctivae normal.   Cardiovascular:       Rate and Rhythm: Normal rate and regular rhythm.      Pulses: Normal pulses.      Heart sounds: Normal heart sounds. No murmur heard.  Pulmonary:      Effort: Pulmonary effort is normal. No respiratory distress.      Breath sounds: Normal breath sounds.   Abdominal:      General: Abdomen is flat. Bowel sounds are normal. There is no distension.      Palpations: Abdomen is soft.      Tenderness: There is no abdominal tenderness.   Musculoskeletal:         General: Normal range of motion.      Cervical back: Normal range of motion.      Right lower leg: No edema.      Left lower leg: No edema.      Comments: Right arm with a/v fistula without bleeding, bandaging c/d/i   Skin:     General: Skin is warm and dry.      Comments: Skin indicative of scleroderma of hands and face   Neurological:      General: No focal deficit present.      Mental Status: She is alert and oriented to person, place, and time.      Motor: Weakness present.   Psychiatric:         Mood and Affect: Mood normal.         Behavior: Behavior normal.         Thought Content: Thought content normal.         Judgment: Judgment normal.         Fluids    Intake/Output Summary (Last 24 hours) at 6/20/2023 1338  Last data filed at 6/20/2023 1100  Gross per 24 hour   Intake 2190.74 ml   Output --   Net 2190.74 ml       Laboratory  Recent Labs     06/19/23  0625 06/19/23  1500 06/20/23  0217   WBC 8.2 9.6 13.5*   RBC 5.07 4.07* 3.75*   HEMOGLOBIN 11.8* 9.6* 8.8*   HEMATOCRIT 39.9 31.2* 28.8*   MCV 78.7* 76.7* 76.8*   MCH 23.3* 23.6* 23.5*   MCHC 29.6* 30.8* 30.6*   RDW 66.5* 65.4* 65.5*   PLATELETCT 98* 90* 97*     Recent Labs     06/18/23  2227 06/19/23  0625 06/20/23  0217   SODIUM 134* 131* 130*   POTASSIUM 4.4 4.9 4.5   CHLORIDE 95* 95* 96   CO2 27 26 23   GLUCOSE 91 91 78   BUN 26* 29* 38*   CREATININE 4.30* 4.50* 5.07*   CALCIUM 8.8 8.5 7.9*     Recent Labs     06/19/23  1551   APTT 32.6   INR 1.43*               Imaging  IR-US GUIDED PIV   Final Result     Ultrasound-guided PERIPHERAL IV INSERTION performed by    qualified nursing staff as above.      [id number] VIEW   Final Result         Feeding tube with tip projecting over the expected area of the first portion duodenum.      EC-ECHOCARDIOGRAM COMPLETE W/O CONT   Final Result      DX-ABDOMEN FOR TUBE PLACEMENT   Final Result      Feeding tube tip at the distal stomach.      These findings were discussed with MARLENE BRENNER on 6/18/2023 5:01 PM.         DX-ABDOMEN FOR TUBE PLACEMENT   Final Result         Feeding tube with tip projecting over the expected area of the first portion duodenum.      IR-EXTREMITY ANGIOGRAM-UNILATERAL RIGHT    (Results Pending)   CT-CHEST,ABDOMEN,PELVIS WITH    (Results Pending)        Assessment/Plan  Problem Representation:    * Anemia associated with acute blood loss- (present on admission)  Assessment & Plan  Presents to the ED with hemoglobin 6.1, blood loss from AV fistula site at dialysis center.  Per patient she had blood loss at dialysis last Friday for which she came to the ED and required a stitch to be placed.  Also had blood loss from AV fistula site at dialysis on Monday.  History of having transfusion reactions requiring Benadryl and getting a blood transfusion with dialysis.  Anemia likely due to acute blood loss from AV fistula, coagulopathy labs show elevated INR 1.34 with TEG findings largely normal with no indication for FFP or cryo.  -Nephrology consulted, appreciate recs: plan to continue dialysis routine MWF  -Benadryl 25 mg IV for dialysis  -Epistaxis 6/16 with Hgb of 6.4, transfused 1upRBC with dialysis  -AV fistula bleed 6/19 - see AV fistula  -qam H&H     Fever  Assessment & Plan  Fever night and am of 06/19-06/20  Leukocytosis, tachycardia without an identifiable infectious source. No sacral ulcers, no warm/swollen/painful joints. Procal 42.2, lactic acid 2.4. ESR 50, CRP 11.93  -tylenol suppository for fever  -BCX collected  -one pIV access will  obtain second access   -if continues to have fever will start on zosyn empirically     Severe protein-calorie malnutrition (HCC)- (present on admission)  Assessment & Plan  Patient does not want NG tube but has changed her mind regarding feeding tube. High surgical candidate therefore further studies for cause of nausea pending.  -Nutrition consult  -Vitamins, copper low - replete with tube feeds if possible, multivitamin po  -Low Zn replete with 50mg daily  -prealbumin 5  -Boost with meals  -Calorie count  -Gi consulted, appreciate recs for marinol. Working up adrenal insufficiency  -Psychiatry consulted recs made for scheduled ativan and benadryl, remeron switch to lexapro 20 mg, zyprexa continue at current dose.  -Psychology consult placed  -palliative care consult placed   -dobhoff placed 6/19, couldn't tolerate overnight and removed  - Echo EF 45%, moderate concentric LVH, global hypokinesis, G1 diastolic dysfunction, mild aortic insufficiency     HTN (hypertension)- (present on admission)  Assessment & Plan  We will monitor blood pressure, patient uses clonidine patch/p.o. and amlodipine 10 mg as needed  -Continue home atenolol 25 mg  -cont home clonidine patch  -hydralazine prn    Arteriovenous fistula, acquired (HCC)- (present on admission)  Assessment & Plan  Coagulation studies show slight elevation INR 1.47, TEG no indication for FFT/cryo.  -Bleeding recurred 6/19 Vascular surgery Dr. Emerson took patient for right fistulogram for treatment of central venous stenosis with venoplasty.  -Outpatient followup with vascular surgeon  3-6 months  -fistula cleared for dialysis    Copper deficiency  Assessment & Plan  Nutrition input on diet    Vitamin A deficiency  Assessment & Plan  Nutrition input on diet    Heart failure with reduced ejection fraction (HCC)  Assessment & Plan   Echo EF 45%, moderate concentric LVH, global hypokinesis, G1 diastolic dysfunction, mild aortic insufficiency   -look at GDMT,  patient currently having issues intaking po meds. controlling nausea    Fe deficiency anemia  Assessment & Plan  Fe panel with Fe 59, TIB and Fe% uncalculable, unsat Fe <17 likely due to ESRD and malnutrition  -epoetin with dialysis      Subclinical hypothyroidism  Assessment & Plan  T4 0.9 at lower end of range, TSH 9.130. Likely related to malnutrition rather than indicative of hypothyroidism.  Holding off on synthroid as given patient's malnutrition will not want to increase metabolism.     Disorder of electrolytes  Assessment & Plan  Post dialysis session today  -monitor and replete as needed K>4, PO4>3, Mg>2    Metabolic alkalosis  Assessment & Plan  Resolved  Potential causes of nausea, patient does not have hyperkalemia or diarrhea etiology potentially associated with ESRD      Systemic lupus erythematosus with organ system involvement (HCC)- (present on admission)  Assessment & Plan  - Continue home Plaquenil, mycophenolate, prednisone    Hyponatremia- (present on admission)  Assessment & Plan  Sodium 128 moderate hyponatremia, historically has mild to moderate with  lowest 120.  Etiology likely considerable due to malnutrition and ESRD  -Will receive D10 and NS  -1L fluid restriction per nephrology  -Continue to monitor    Chronic respiratory failure with hypoxia (HCC)- (present on admission)  Assessment & Plan  Home oxygen requirement 5LNC, no increased requirement on admission. Hx of pulmonary HTN  -monitor oxygen requirement    Pancytopenia (HCC)- (present on admission)  Assessment & Plan  Chronic, potential relation to SLE and medications  -continue to monitor   -coagulation labs sig for elevated INR 1.47         VTE prophylaxis: SCDs/TEDs and pharmacologic prophylaxis contraindicated due to bleeding risk    I have performed a physical exam and reviewed and updated ROS and Plan today (6/20/2023). In review of yesterday's note (6/19/2023), there are no changes except as documented above.

## 2023-06-20 NOTE — CONSULTS
"  [facility] PSYCHIATRIC FOLLOW-UP:(established)    DOS: 06/20/23     Reason for admission:  The patient was in mild distress surrounding her current situation with the return of bleeding from her fistula and her desire to go home. When asked what was bothering her she replied \"everything\" and that she \"just wanted to go home\".   Legal Hold Status: voluntary      Chief Complaint:   Chief Complaint   Patient presents with    Bleeding/Bruising     Pt BIB [facility] from [facility]. Pt was having dialysis today, after removing needle from site, bleeding continued for approx 45 min. States this happened Friday as well and required a few stitches. Per EMS, bleeding was controlled by the time they arrived, no active bleeding noted at this time.                S:  Seen today as f/u psych consult.   Observed lying in bed. Denies SI/HI, A/VH. Reports GI upset r/t trial SSRI medication, education provided r/t common side effects and dosage, encouraged to trial medication again at reduced dosage.      O: Medical ROS (as pertinent): No new changes reported to this writer.    Recent Labs     06/18/23  0824 06/19/23  0625 06/19/23  1500 06/20/23  0217   WBC 3.7* 8.2 9.6 13.5*   RBC 4.17* 5.07 4.07* 3.75*   HEMOGLOBIN 10.1* 11.8* 9.6* 8.8*   HEMATOCRIT 32.4* 39.9 31.2* 28.8*   MCV 77.7* 78.7* 76.7* 76.8*   MCH 24.2* 23.3* 23.6* 23.5*   RDW 63.1* 66.5* 65.4* 65.5*   PLATELETCT 107* 98* 90* 97*   NEUTSPOLYS 68.80 81.80*  --  96.50*   LYMPHOCYTES 20.80* 13.20*  --  0.00*   MONOCYTES 9.00 3.90  --  1.70   EOSINOPHILS 0.30 0.40  --  0.00   BASOPHILS 0.80 0.50  --  0.90   RBCMORPHOLO  --  Present  --  Present     Recent Labs     06/18/23  2227 06/19/23  0625 06/20/23  0217   SODIUM 134* 131* 130*   POTASSIUM 4.4 4.9 4.5   CHLORIDE 95* 95* 96   CO2 27 26 23   GLUCOSE 91 91 78   BUN 26* 29* 38*     Recent Labs     06/18/23  2227 06/19/23  0625 06/20/23  0217   ALBUMIN 2.5* 2.3* 2.3*   TBILIRUBIN 0.6 0.8 0.8   ALKPHOSPHAT " "93 89 79   TOTPROTEIN 9.8* 9.2* 8.0   ALTSGPT <5 5 <5   ASTSGOT 10* 10* 8*   CREATININE 4.30* 4.50* 5.07*         PSYCHIATRIC EXAM (MSE):   BP (!) 150/92   Pulse (!) 134   Temp 37.9 °C (100.2 °F) (Temporal)   Resp 17   Ht 1.651 m (5' 5\")   Wt 39.3 kg (86 lb 10.3 oz)   SpO2 96%       Constitutional: as noted above  General Appearance/Behavior: 27 y.o. appears older than stated age, cachectic intermittent eye contact cooperative, No behavioral disturbances  Abnormal Movements: none, no PMA/PMR or tremor observed.  Gait and Posture: not observed  Musculoskeletal: as noted above  Mood: \"tired\"  Affect: Appropriate   Speech: quiet  Language:  spontaneous, comprehends spoken commands, and fluent   Thought Process: Goal Directed  Thought Content: Denies SI/HI. Denies A/VH, no e/o delusions, PI, or internal preoccupation.   Insight/Judgement:  fair  Alert/Orientation: alert, oriented to person, place and time  Attn/Concentration: short attention span  Fund of Knowledge: not tested  Memory recent/remote: not tested  MMSE: deferred this visit          Meds Current:  Scheduled Medications   Medication Dose Frequency    magnesium sulfate  4 g Once    amLODIPine  10 mg Q DAY    bisacodyl  10 mg Once    cloNIDine  1 Patch Q7 DAYS    pantoprazole  40 mg DAILY    thiamine  100 mg DAILY    dexamethasone  0.76 mg DAILY    Pharmacy  1 Each PHARMACY TO DOSE    hydroxychloroquine  200 mg QAM    escitalopram  20 mg DAILY    OLANZapine  2.5 mg Q EVENING    dronabinol  2.5 mg BEFORE LUNCH AND DINNER    senna-docusate  2 Tablet BID    folic acid  1 mg DAILY    therapeutic multivitamin-minerals  1 Tablet DAILY    zinc sulfate  220 mg DAILY    mycophenolate  500 mg QAM    LORazepam  0.5 mg TID    diphenhydrAMINE  25 mg TID    epoetin  13,000 Units MO, WE + FR     Allergies:   Allergies   Allergen Reactions    Cephalexin Rash     Nausea and rash   Hives  .    Clindamycin Rash     Nausea and rash     Hive  .    Hydrocodone Rash and Nausea "     Rash      Maxipime [Cefepime] Itching    Methylprednisolone Unspecified     Anxious      Tolerates prednisone     Metoprolol Rash and Nausea     Nausea and rash     Tolerates antenalol          Diagnostic X-Ray Materials Itching     Per patient's mother    Furosemide      Other reaction(s): Unknown-Explain in Comments  Unable to obtain at this time    Hydroxyzine Hcl Rash    Insulin      Other reaction(s): Other-Reaction in Comments  Patient is very sensitive to insulin administration, especially when treating hyperkalemia.  Has a hx of blood glucose 12.  Closely monitor for severe hypoglycemia that could precipitate seizures. If it's required, then give less insulin/more dextrose to prevent such hypoglycemic episodes.    9/25/2022   Verified by Dr. Kelsie Emerson (endocrinoligy)    Compazine Anxiety    Fentanyl And Related Anxiety    Metoclopramide Anxiety    Tape Rash     Paper tape is ok          ASSESSMENT:   1. Anemia, unspecified type    2. Acute blood loss anemia      Psychiatry:    Anxiety disorder, NOS  Major depressive disorder, recurrent    Medical:   HTN  Lupus  Hypothyroidism  Anemia  ESRD  Heart failure    RECOMMENDATIONS:  Legal Status: voluntary      Observation status:   No sitter needed    Visitors: Yes  Personal belongings: Yes    Discussed/voalted: CASIE Anthony MD    Medication Recommendations: Final orders as per Treatment Team  Decrease Lexapro to 10mg PO daily  Risks/benefits/side effects discussed, patient verbalized understanding    Reviewed safety plan: 911, ER, PCM, MHC, suicide crisis line, nursing staff while inpatient.    Will Continue to Follow. Thank you for the consult.

## 2023-06-20 NOTE — PROCEDURES
Nephrology/Hemodialysis note    Patient with ESRD/HD, SLE admitted with AVF prolonged bleeding, failure to thrive  S/p AVF angioplasty  Seen and examined during dialysis  Tachycardic  UF 1-2 L  Lab results reviewed  Please see dialysis flow sheet for details

## 2023-06-20 NOTE — CARE PLAN
The patient is Unstable - High likelihood or risk of patient condition declining or worsening    Shift Goals  Clinical Goals: Pain mgmt  Patient Goals: Pain control  Family Goals: Family not present    Progress made toward(s) clinical / shift goals:   Pt is drowsy, oriented x3, Pt remains tachy with HR around 130bpm.  Pt administered prn dilaudid for pain generalized pain.  BG 60 D10 bolus administered and recheck was 69.  [facility] resident informed. Pt refusing PO fluids at this time.  R arm fistula has no signs of bleeding at this time.  Will continue with plan of care      Problem: Knowledge Deficit - Standard  Goal: Patient and family/care givers will demonstrate understanding of plan of care, disease process/condition, diagnostic tests and medications  Outcome: Progressing     Problem: Risk for Bleeding  Goal: Patient will take measures to prevent bleeding and recognizes signs of bleeding that need to be reported immediately to a health care professional  Outcome: Progressing  Goal: Patient will not experience bleeding as evidenced by normal blood pressure, stable hematocrit and hemoglobin levels and desired ranges for coagulation profiles  Outcome: Progressing     Problem: Skin Integrity  Goal: Skin integrity is maintained or improved  Outcome: Progressing     Problem: Fall Risk  Goal: Patient will remain free from falls  Outcome: Progressing     Patient is not progressing towards the following goals:      Problem: Pain - Standard  Goal: Alleviation of pain or a reduction in pain to the patient’s comfort goal  Outcome: Not Met   PRN pain medications administered per request

## 2023-06-20 NOTE — ASSESSMENT & PLAN NOTE
Resolved, no fevers  Fever night and am of 06/19-06/20  Leukocytosis, tachycardia without an identifiable infectious source. No sacral ulcers, no warm/swollen/painful joints. Procal 42.2, lactic acid 2.4. ESR 50, CRP 11.93  -tylenol suppository for fever  -BCX 6/20 positive for gram - rods  -zosyn 6/21 start, switch to Unasyn on 6/22.  Blood cultures returned with E. coli sensitive to Zosyn and returned antibiotic course to Zosyn  -BCx 06/22/23 NGTD

## 2023-06-20 NOTE — THERAPY
Occupational Therapy Contact Note    Patient Name: Lily Nicole  Age:  27 y.o., Sex:  female  Medical Record #: [id number]  Today's Date: 6/20/2023 06/20/23 1030   Interdisciplinary Plan of Care Collaboration   Collaboration Comments Attempted OT eval. RN requested defer therapy for today. Will re-attempt as appropriate

## 2023-06-20 NOTE — PROGRESS NOTES
Telemetry Report:    Rhythm:    ST   Heart Rate:   132-142   Ectopy:          NH:        .14   QRS: .06        QT:  .27     Per Telestrip from Monitor Room

## 2023-06-20 NOTE — PROGRESS NOTES
Lab called with critical result of Pro-calcitonin of 42.20 at 1105. Critical lab result read back to lab.   Dr. Anthony notified of critical lab result at 1109.  Critical lab result read back by Dr. Winn.

## 2023-06-20 NOTE — THERAPY
Physical Therapy Contact Note    Patient Name: Lily Nicole  Age:  27 y.o., Sex:  female  Medical Record #: [id number]  Today's Date: 6/20/2023    PT consullt received. Defer evaluation per RN due to medically fragile and will be going to dialysis this AM. Will reattempt PT evaluation as appropriate. Thanks    Ashly Zelaya, PT, DPT    [employer] [id number]

## 2023-06-20 NOTE — CARE PLAN
The patient is Unstable - High likelihood or risk of patient condition declining or worsening    Shift Goals  Clinical Goals: pain management, monitor/manage BS, comfort, IV access, HD  Patient Goals: pain management  Family Goals: FAHAD    Progress made toward(s) clinical / shift goals:    Problem: Risk for Bleeding  Goal: Patient will take measures to prevent bleeding and recognizes signs of bleeding that need to be reported immediately to a health care professional  Outcome: Progressing  Note: Patient had no clear signs of bleeding from her fistula today before and after dialysis       Patient is not progressing towards the following goals:      Problem: Fluid Volume  Goal: Fluid volume balance will be maintained  Outcome: Not Progressing  Note: Patient had poor oral intake today, only had a few sips of water for family and RN.      Problem: Nutrition  Goal: Patient's nutritional and fluid intake will be adequate or improve  Outcome: Not Progressing  Note: Patient un-interested in any nutritional intake today as well as any oral medications

## 2023-06-20 NOTE — DISCHARGE PLANNING
TCN following. HTH/SCP TCN Hannah RN  chart review completed.   [employer] messaged LEXIS Gonzalez to inform her thisTCN is following this mbr.  Per Chart review:  -- Previous DC Planning notes by LEXIS Phillips) on 6/9/2023 indicated Palliative Medicine consulted. In addition, Pt was not not open to having hospice conversation.  - Palliative Medicine following pt Kindly refer to Dr Leavitt's Palliative Medicine notes on 6/19/2023  4:40 PM.    Awaiting medical clearance.  Will await Therapy and Provider's DC disposition recommendations.   TCN will continue to follow and collaborate with Hospital Case Management discharge planning team as additional post acute needs arise. Thank you.    Completed:  OT and PT recommendations  pending.  Palliative Medicine Consult 6/19/2023   Choice obtained: NONE  [facility] referral not sent as pt declined [facility] services 6/15/2023

## 2023-06-20 NOTE — CARE PLAN
The patient is Unstable - High likelihood or risk of patient condition declining or worsening    Shift Goals  Clinical Goals: pain control  Patient Goals: pain control  Family Goals: pain control    Progress made toward(s) clinical / shift goals:    Problem: Risk for Bleeding  Goal: Patient will take measures to prevent bleeding and recognizes signs of bleeding that need to be reported immediately to a health care professional  Outcome: Progressing     Continue to assess fistula. Pressure dressing in place and sand bag in the room at this time. Currently, dressing is CDI, no issues noted.      Problem: Pain - Standard  Goal: Alleviation of pain or a reduction in pain to the patient’s comfort goal  Outcome: Progressing     See MAR.  Will continue to address pain as needed.  Ativan given PRN to control anxiety related to pain and pain control

## 2023-06-20 NOTE — PROGRESS NOTES
Hypoglycemia Intervention    Hypoglycemia protocol intervention:  Blood glucose 65 at 0826.  Intervention: 25 g IV dextrose per MAR   Infusion running slow as patients IV is sluggish, per VAT team there are no other access points. Team notified for possible central line.   Repeat blood glucose 84 at 1035.  Intervention: continuous D10 infusion at 50mL/hr will recheck BS q4 and PRN for signs of hypoglycemia

## 2023-06-20 NOTE — PROGRESS NOTES
3hr HD started @ 1128 and completed @ 1430,net UF = 1600ml- adjusted due to increased -135bpm.VSS post HD,RUAAVF + B/T,cannulation sites covered with DD,CDI,hemostasis achieved after 15 minutes of holding.Report given to Reji Florez RN.

## 2023-06-21 PROBLEM — R78.81 BACTEREMIA OF UNDETERMINED ETIOLOGY: Status: ACTIVE | Noted: 2023-01-01

## 2023-06-21 NOTE — ASSESSMENT & PLAN NOTE
Febrile episodes, 6/20 BCXx2 grew E. coli, unknown etiology for infection. No SSTI, CT abd/pelvis without evidence of abscess or lesions, possible aspiration pneumonia.  -Zosyn started 6/21, transitioned to cefazolin 6/26-6/28 for 7 days therapy for GN bacteremia  -BCx repeated 06/22/23 NGTD

## 2023-06-21 NOTE — CARE PLAN
The patient is Watcher - Medium risk of patient condition declining or worsening    Shift Goals  Clinical Goals: Pain management, infection control,  Patient Goals: pain management  Family Goals: FAHAD    Progress made toward(s) clinical / shift goals:    Problem: Knowledge Deficit - Standard  Goal: Patient and family/care givers will demonstrate understanding of plan of care, disease process/condition, diagnostic tests and medications  Outcome: Progressing  Note: Patient has consistently reported an 8-9/10 pain even when getting dilaudid around the clock for her pain.      Problem: Risk for Bleeding  Goal: Patient will take measures to prevent bleeding and recognizes signs of bleeding that need to be reported immediately to a health care professional  Outcome: Progressing  Note: Patient understands the history of bleeding she has had from her AV fistula which contributed to her hospitalization and knows to let nursing staff know if there are any other signs of bleeding.

## 2023-06-21 NOTE — CONSULTS
"  [facility] PSYCHIATRIC FOLLOW-UP:(established)    DOS: 06/21/23     Reason for admission:  The patient was in mild distress surrounding her current situation with the return of bleeding from her fistula and her desire to go home. When asked what was bothering her she replied \"everything\" and that she \"just wanted to go home\".   Legal Hold Status: not applicable      Chief Complaint:   Chief Complaint   Patient presents with    Bleeding/Bruising     Pt BIB [facility] from [facility]. Pt was having dialysis today, after removing needle from site, bleeding continued for approx 45 min. States this happened Friday as well and required a few stitches. Per EMS, bleeding was controlled by the time they arrived, no active bleeding noted at this time.                S:  Seen today as f/u psych consult.   Observed lying in bed, lethargic. Continues to refuse medications d/t GI upset. Not agreeable to start different PO medication for same condition. Decreased mood, energy. Denies SI/HI, A/VH. Does not present an acute danger to self.      O: Medical ROS (as pertinent): No new changes reported to this writer.    Recent Labs     06/19/23  0625 06/19/23  1500 06/20/23 0217 06/21/23  0315   WBC 8.2 9.6 13.5* 8.1   RBC 5.07 4.07* 3.75* 3.09*   HEMOGLOBIN 11.8* 9.6* 8.8* 7.3*   HEMATOCRIT 39.9 31.2* 28.8* 23.8*   MCV 78.7* 76.7* 76.8* 77.0*   MCH 23.3* 23.6* 23.5* 23.6*   RDW 66.5* 65.4* 65.5* 66.4*   PLATELETCT 98* 90* 97* 69*   NEUTSPOLYS 81.80*  --  96.50* 96.70*   LYMPHOCYTES 13.20*  --  0.00* 1.70*   MONOCYTES 3.90  --  1.70 0.80   EOSINOPHILS 0.40  --  0.00 0.00   BASOPHILS 0.50  --  0.90 0.00   RBCMORPHOLO Present  --  Present Present     Recent Labs     06/19/23  0625 06/20/23  0217 06/21/23  0315   SODIUM 131* 130* 134*   POTASSIUM 4.9 4.5 4.6   CHLORIDE 95* 96 99   CO2 26 23 26   GLUCOSE 91 78 82   BUN 29* 38* 26*     Recent Labs     06/19/23  0625 06/20/23 0217 06/21/23 0315   ALBUMIN 2.3* 2.3* 1.8* " "  TBILIRUBIN 0.8 0.8 0.8   ALKPHOSPHAT 89 79 74   TOTPROTEIN 9.2* 8.0 7.5   ALTSGPT 5 <5 <5   ASTSGOT 10* 8* 7*   CREATININE 4.50* 5.07* 3.20*         PSYCHIATRIC EXAM (MSE):   /68   Pulse 87   Temp 36.8 °C (98.3 °F) (Temporal)   Resp 18   Ht 1.651 m (5' 5\")   Wt 39.3 kg (86 lb 10.3 oz)   SpO2 100%       Constitutional: as noted above  General Appearance/Behavior: 27 y.o. appears older than stated age, cachectic intermittent eye contact superficially cooperative, No behavioral disturbances  Abnormal Movements: none, no PMA/PMR or tremor observed.  Gait and Posture: not observed  Musculoskeletal: as noted above  Mood: \"fine\"  Affect: Constricted   Speech: quiet  Language:  spontaneous, comprehends spoken commands, and fluent   Thought Process: Goal Directed  Thought Content: Denies SI/HI. Denies A/VH, no e/o delusions, PI, or internal preoccupation.   Insight/Judgement:  fair  Alert/Orientation: alert, oriented to person, place and time  Attn/Concentration: short attention span  Fund of Knowledge: not tested  Memory recent/remote: not tested  MMSE: deferred this visit          Meds Current:  Scheduled Medications   Medication Dose Frequency    piperacillin-tazobactam  4.5 g Q12HRS    OLANZapine  2.5 mg Q EVENING    dronabinol  2.5 mg BEFORE LUNCH AND DINNER    folic acid  1 mg DAILY    hydroxychloroquine  200 mg QAM    senna-docusate  2 Tablet BID    therapeutic multivitamin-minerals  1 Tablet DAILY    zinc sulfate  220 mg DAILY    escitalopram  10 mg DAILY    amLODIPine  10 mg Q DAY    cloNIDine  1 Patch Q7 DAYS    pantoprazole  40 mg DAILY    thiamine  100 mg DAILY    dexamethasone  0.76 mg DAILY    mycophenolate  500 mg QAM    LORazepam  0.5 mg TID    epoetin  13,000 Units MO, WE + FR     Allergies:   Allergies   Allergen Reactions    Cephalexin Rash     Nausea and rash   Hives  .    Clindamycin Rash     Nausea and rash     Hive  .    Hydrocodone Rash and Nausea     Rash      Maxipime [Cefepime] " Itching    Methylprednisolone Unspecified     Anxious      Tolerates prednisone     Metoprolol Rash and Nausea     Nausea and rash     Tolerates antenalol          Diagnostic X-Ray Materials Itching     Per patient's mother    Furosemide      Other reaction(s): Unknown-Explain in Comments  Unable to obtain at this time    Hydroxyzine Hcl Rash    Insulin      Other reaction(s): Other-Reaction in Comments  Patient is very sensitive to insulin administration, especially when treating hyperkalemia.  Has a hx of blood glucose 12.  Closely monitor for severe hypoglycemia that could precipitate seizures. If it's required, then give less insulin/more dextrose to prevent such hypoglycemic episodes.    9/25/2022   Verified by Dr. Kelsie Emerson (endocrinoligy)    Compazine Anxiety    Fentanyl And Related Anxiety    Metoclopramide Anxiety    Tape Rash     Paper tape is ok          ASSESSMENT:   1. Anemia, unspecified type    2. Acute blood loss anemia       Psychiatry:    Anxiety disorder, NOS  Major depressive disorder, recurrent     Medical:   HTN  Lupus  Hypothyroidism  Anemia  ESRD  Heart failure    RECOMMENDATIONS:  Legal Status: not applicable      Observation status:   No sitter needed    Visitors: Yes  Personal belongings: Yes    Discussed/voalted: CASIE Anthony MD    Medication Recommendations: Final orders as per Treatment Team  No new recommendations; continue Lexapro 10mg PO daily   Risks/benefits/side effects discussed, patient verbalized understanding    Reviewed safety plan: 911, ER, PCM, MHC, suicide crisis line, nursing staff while inpatient.    Signing Off. Thank you for the consult.     Please reconsult as needed.

## 2023-06-21 NOTE — THERAPY
Physical Therapy Contact Note    Patient Name: Lily Nicole  Age:  27 y.o., Sex:  female  Medical Record #: [id number]  Today's Date: 6/21/2023    PT eval attempted. Pt currently out of room for dialysis. Will reattempt PT evaluation as able. Thanks    Ashly Zelaya, PT, DPT    [employer] [phone]

## 2023-06-21 NOTE — THERAPY
Occupational Therapy Contact Note    Patient Name: Lily Nicole  Age:  27 y.o., Sex:  female  Medical Record #: [id number]  Today's Date: 6/21/2023    OT eval attempted; pt in HD. Will hold OT eval and will re-attempt as appropriate/able.   GERONIMO [facility] ; 03/02/2021 ; NPP [facility] [address]

## 2023-06-21 NOTE — PROGRESS NOTES
Hypoglycemia Intervention    Hypoglycemia protocol intervention:  Blood glucose 66 at 1012.  Intervention: 25 g IV dextrose per MAR   Infusion run at 500mL/hr to maintain patency in IV as patients IV is sluggish  Repeat blood glucose 136 at 1140.  Intervention: Continuous IV infusion of D10 running at 50mL/hr.     Encouraging patient and family to increase oral intake. Patient was able to tolerate a couple spoonfuls of yogurt per patients mother.

## 2023-06-21 NOTE — CARE PLAN
The patient is Unstable - High likelihood or risk of patient condition declining or worsening    Shift Goals  Clinical Goals: pain management, HD, IV ABX, goals of care discussion, clear POC with nutrition  Patient Goals: pain managment  Family Goals: POC, comfort, pain relief    Progress made toward(s) clinical / shift goals:    Problem: Pain - Standard  Goal: Alleviation of pain or a reduction in pain to the patient’s comfort goal  Outcome: Progressing  Note: PRN pain medications in use for pain control, as well as relaxation, repositioning, rest and emotional support from family and RN     Problem: Communication  Goal: The ability to communicate needs accurately and effectively will improve  Outcome: Progressing  Note: Patient much more alert today and able to communicate needs for effectively than yesturday       Patient is not progressing towards the following goals:

## 2023-06-21 NOTE — DISCHARGE PLANNING
"HTH/SCP TCN chart review completed. Collaborated with ALICIA Vera. Current discharge considerations are home with mother assist.. Per CM note on 6/20, \"Pt is very weak and cachectic but mom declined hospice care. She wants to take pt home and take care of her.\". Per TCN note on 6/15, patient owns 4WW, on 5L chronically, and \"patient does note feel as though she will have any home health needs.\" No further TCN needs at this time.      TCN will continue to follow and collaborate with discharge planning team as additional post acute needs arise. Thank you.    Completed:  Awaiting PT/OT recommendations   RN requested PT/OT defer therapy evaluations 6/20  Choice obtained: none  [facility] referral not sent, pt declined [facility] services 6/15    "

## 2023-06-21 NOTE — PROGRESS NOTES
[facility] Internal Medicine Daily Progress Note    Date of Service  6/21/2023    [facility] Team: R IM Orange Team   Attending: Joel Harrison M.d.  Senior Resident: Dr. Kt Deras  Intern:  Dr. Raul Anthony  Contact Number: [phone]    Chief Complaint  Lily Nicole is a 27 y.o. female admitted 6/14/2023 with Acute blood loss from right arm AV fistula    Hospital Course  Lily Nicole is a 27 y.o. female with significant medical history of SLE glomerulonephritis syndrome with  ESRD on hemodialysis (on plaquenil, mycophenolate, prednisone), malnutrition, seizure associated with hypoglycemia, HTN, constipation with chronic opioid use, migraines, pulmonary hypertension on 5LNC who presented 6/14/2023 with acute blood loss anemia from her AV fistula (placed 11-12 yrs ago) while at the dialysis center with hemoglobin 6.1. Patient transfused 1 uPRBC irradiated with dialysis and benadryl 50mg IV due to history of shortness of breath associated with transfusions. Hemoglobin stabilized at 9.1.   Her severe malnutrition status has been discussed and further workup for nausea leading to decreased appetite is pending with upper GI study. GI and General Surgery have been called regarding placing a G tube or PEG, at this time recs made for further studies and possible NG tube placement given her current condition she has a high risk of bleeding and difficulty healing.   Patient is also having hypoglycemic episodes associated with low appetite.    Palliative care recs for referral to outpatient/home pallaitive care team on discharge.     6/19 bleeding from AV fistula, Hgb holding 11.8. Vascular surgery consulted and patient underwent right fistulogram for treatment of central venous stenosis with venoplasty. Follow up with Dr. Dotson outpatient in 3-6 months to evaluate for central venous stenosis recurrence.     Interval Problem Update  Patient started on Zosyn given bcx x 2 positive for gram negative  rods  This am patient was fatigued still having nausea and pain. Localized to back today but no spinal tenderness per patient, unable to localize where on her back.   -CT thorax/abd/pelvis without findings of infectious source or evidence of mesenteric ischemia or necrosis. B/l multifocal pneumonitis. Large stool burden throughout small/large intestines without sig stool ball.   -2x piv currently   -Bowel regimen with suppository and enema   -with negative blood cultures will likely need to start on TPN    I have discussed this patient's plan of care and discharge plan at IDT rounds today with Case Management, Nursing, Nursing leadership, and other members of the IDT team.    Consultants/Specialty  general surgery, GI, nephrology, psychiatry, and psychology and palliative care    Code Status  DNAR, I OK    Disposition  The patient is not medically cleared for discharge to home or a post-acute facility.      I have placed the appropriate orders for post-discharge needs.    Review of Systems  Review of Systems   Constitutional:  Positive for malaise/fatigue and weight loss.   Gastrointestinal:  Positive for nausea. Negative for vomiting.   Genitourinary:         Anuric   Musculoskeletal:  Positive for back pain. Negative for joint pain, myalgias and neck pain.   Neurological:  Positive for weakness. Negative for headaches.   Psychiatric/Behavioral:  The patient is nervous/anxious.         Physical Exam  Temp:  [36.3 °C (97.4 °F)-37.7 °C (99.8 °F)] 36.3 °C (97.4 °F)  Pulse:  [] 98  Resp:  [15-19] 19  BP: (104-128)/(60-74) 122/60  SpO2:  [96 %-100 %] 97 %    Physical Exam  Constitutional:       General: She is not in acute distress.     Appearance: She is ill-appearing. She is not toxic-appearing or diaphoretic.      Comments: Cachectic with temporal wasting, fatigued.   HENT:      Head: Normocephalic and atraumatic.      Right Ear: External ear normal.      Left Ear: External ear normal.      Nose: Nose normal.       Mouth/Throat:      Mouth: Mucous membranes are dry.   Eyes:      General: No scleral icterus.        Right eye: No discharge.         Left eye: No discharge.      Extraocular Movements: Extraocular movements intact.      Conjunctiva/sclera: Conjunctivae normal.   Cardiovascular:      Rate and Rhythm: Normal rate and regular rhythm.      Pulses: Normal pulses.      Heart sounds: Normal heart sounds. No murmur heard.  Pulmonary:      Effort: Pulmonary effort is normal. No respiratory distress.      Breath sounds: Normal breath sounds.   Abdominal:      General: Abdomen is flat. Bowel sounds are normal. There is no distension.      Palpations: Abdomen is soft.      Tenderness: There is no abdominal tenderness.   Musculoskeletal:         General: Normal range of motion.      Cervical back: Normal range of motion.      Right lower leg: No edema.      Left lower leg: No edema.      Comments: Right arm with a/v fistula without bleeding, bandaging c/d/i   Skin:     General: Skin is warm and dry.      Comments: Skin indicative of scleroderma of hands and face   Neurological:      General: No focal deficit present.      Mental Status: She is alert and oriented to person, place, and time.      Motor: Weakness present.   Psychiatric:         Mood and Affect: Mood normal.         Behavior: Behavior normal.         Thought Content: Thought content normal.         Judgment: Judgment normal.         Fluids    Intake/Output Summary (Last 24 hours) at 6/21/2023 1655  Last data filed at 6/21/2023 1620  Gross per 24 hour   Intake 505 ml   Output 2100 ml   Net -1595 ml       Laboratory  Recent Labs     06/19/23  1500 06/20/23  0217 06/21/23  0315   WBC 9.6 13.5* 8.1   RBC 4.07* 3.75* 3.09*   HEMOGLOBIN 9.6* 8.8* 7.3*   HEMATOCRIT 31.2* 28.8* 23.8*   MCV 76.7* 76.8* 77.0*   MCH 23.6* 23.5* 23.6*   MCHC 30.8* 30.6* 30.7*   RDW 65.4* 65.5* 66.4*   PLATELETCT 90* 97* 69*     Recent Labs     06/19/23  0625 06/20/23  0217 06/21/23  0315    SODIUM 131* 130* 134*   POTASSIUM 4.9 4.5 4.6   CHLORIDE 95* 96 99   CO2 26 23 26   GLUCOSE 91 78 82   BUN 29* 38* 26*   CREATININE 4.50* 5.07* 3.20*   CALCIUM 8.5 7.9* 7.8*     Recent Labs     06/19/23  1551   APTT 32.6   INR 1.43*               Imaging  CT-CHEST,ABDOMEN,PELVIS WITH   Final Result      1.  No definite CT evidence for mesenteric ischemia or necrosis.   2.  Findings consistent with bilateral multifocal pneumonitis.   3.  Cardiomegaly.   4.  Enlargement of the main pulmonary artery which is suggestive of pulmonary arterial hypertension.   5.  Hepatomegaly.      6.  Splenomegaly.   7.  Status post cholecystectomy.   8.  Enlarged mediastinal, axillary and retroperitoneal lymph nodes of uncertain etiology and significance. Findings appear similar to the previous exam.   9.  Distal right subclavian vein metallic stent.   10.  Diffuse anasarca.      IR-US GUIDED PIV   Final Result    Ultrasound-guided PERIPHERAL IV INSERTION performed by    qualified nursing staff as above.      [id number] VIEW   Final Result         Feeding tube with tip projecting over the expected area of the first portion duodenum.      EC-ECHOCARDIOGRAM COMPLETE W/O CONT   Final Result      DX-ABDOMEN FOR TUBE PLACEMENT   Final Result      Feeding tube tip at the distal stomach.      These findings were discussed with MARLENE BRENNER on 6/18/2023 5:01 PM.         DX-ABDOMEN FOR TUBE PLACEMENT   Final Result         Feeding tube with tip projecting over the expected area of the first portion duodenum.      IR-EXTREMITY ANGIOGRAM-UNILATERAL RIGHT    (Results Pending)        Assessment/Plan  Problem Representation:    * Anemia associated with acute blood loss- (present on admission)  Assessment & Plan  Presents to the ED with hemoglobin 6.1, blood loss from AV fistula site at dialysis center.  Per patient she had blood loss at dialysis last Friday for which she came to the ED and required a stitch to be placed.  Also had blood loss  from AV fistula site at dialysis on Monday.  History of having transfusion reactions requiring Benadryl and getting a blood transfusion with dialysis.  Anemia likely due to acute blood loss from AV fistula, coagulopathy labs show elevated INR 1.34 with TEG findings largely normal with no indication for FFP or cryo.  -Nephrology consulted, appreciate recs: plan to continue dialysis routine MWF  -Ativan prn for anxiety for dialysis (d/c benadryl IV given patient getting ativan IV)  -Epistaxis 6/16 with Hgb of 6.4, transfused 1upRBC with dialysis  -AV fistula bleed 6/19 - see AV fistula  -qam H&H     Bacteremia of undetermined etiology  Assessment & Plan  6/20 BCXx2 gram negative rods, unknown etiology for infection. No skin ulcers, does have bleeding/open wound during fistula bleeds. Given gram (-) rods, concern for abdominal source but CT abd/pelvis without evidence of abscess or inflammation. Also no abd pain on examination.  -Zosyn started 6/21 with decrease in fever  -collect 48hrs bcx    Fever  Assessment & Plan  Fever night and am of 06/19-06/20  Leukocytosis, tachycardia without an identifiable infectious source. No sacral ulcers, no warm/swollen/painful joints. Procal 42.2, lactic acid 2.4. ESR 50, CRP 11.93  -tylenol suppository for fever  -BCX 6/20 positive for gram - rods  -two pIV access will obtain second access   -zosyn 6/21 start    Severe protein-calorie malnutrition (HCC)- (present on admission)  Assessment & Plan  Patient does not want NG tube but has changed her mind regarding feeding tube. High surgical candidate therefore further studies for cause of nausea pending.  -Nutrition consult  -Vitamins, copper low - replete with tube feeds if possible, multivitamin po  -Low Zn replete with 50mg daily  -prealbumin 5  -Boost with meals  -Calorie count  -Gi consulted, appreciate recs for marinol. Working up adrenal insufficiency  -Psychiatry consulted recs made for scheduled ativan and benadryl, remeron  switch to lexapro 20 mg, zyprexa continue at current dose.  -Psychology consult placed  -palliative care consult placed   -dobhoff placed 6/19, couldn't tolerate overnight and removed  - Echo EF 45%, moderate concentric LVH, global hypokinesis, G1 diastolic dysfunction, mild aortic insufficiency   -ACTH slight elevation, cortisol within range: doesn't support adrenal insufficiency as cause to n/v  -TPN on hold given bacteremia     HTN (hypertension)- (present on admission)  Assessment & Plan  We will monitor blood pressure, patient uses clonidine patch/p.o. and amlodipine 10 mg as needed  -Continue home atenolol 25 mg  -cont home clonidine patch  -hydralazine prn    Arteriovenous fistula, acquired (HCC)- (present on admission)  Assessment & Plan  Coagulation studies show slight elevation INR 1.47, TEG no indication for FFT/cryo.  -Bleeding recurred 6/19 Vascular surgery Dr. Emerson took patient for right fistulogram for treatment of central venous stenosis with venoplasty.  -Outpatient followup with vascular surgeon  3-6 months  -fistula cleared for dialysis    Copper deficiency  Assessment & Plan  Nutrition input on diet    Vitamin A deficiency  Assessment & Plan  Nutrition input on diet    Heart failure with reduced ejection fraction (HCC)  Assessment & Plan   Echo EF 45%, moderate concentric LVH, global hypokinesis, G1 diastolic dysfunction, mild aortic insufficiency   -look at GDMT, patient currently having issues intaking po meds. controlling nausea    Fe deficiency anemia  Assessment & Plan  Fe panel with Fe 59, TIB and Fe% uncalculable, unsat Fe <17 likely due to ESRD and malnutrition  -epoetin with dialysis      Subclinical hypothyroidism  Assessment & Plan  T4 0.9 at lower end of range, TSH 9.130. Likely related to malnutrition rather than indicative of hypothyroidism.  Holding off on synthroid as given patient's malnutrition will not want to increase metabolism.     Disorder of  electrolytes  Assessment & Plan  Post dialysis session today  -monitor and replete as needed K>4, PO4>3, Mg>2    Metabolic alkalosis  Assessment & Plan  Resolved  Potential causes of nausea, patient does not have hyperkalemia or diarrhea etiology potentially associated with ESRD      Systemic lupus erythematosus with organ system involvement (HCC)- (present on admission)  Assessment & Plan  - Continue home Plaquenil, mycophenolate, prednisone    Hyponatremia- (present on admission)  Assessment & Plan  Sodium 128 moderate hyponatremia, historically has mild to moderate with  lowest 120.  Etiology likely considerable due to malnutrition and ESRD  -Will receive D10 and NS  -1L fluid restriction per nephrology  -Continue to monitor    Chronic respiratory failure with hypoxia (HCC)- (present on admission)  Assessment & Plan  Home oxygen requirement 5LNC, no increased requirement on admission. Hx of pulmonary HTN  -monitor oxygen requirement    Pancytopenia (HCC)- (present on admission)  Assessment & Plan  Chronic, potential relation to SLE and medications  -continue to monitor   -coagulation labs sig for elevated INR 1.47         VTE prophylaxis: SCDs/TEDs and pharmacologic prophylaxis contraindicated due to bleeding risk    I have performed a physical exam and reviewed and updated ROS and Plan today (6/21/2023). In review of yesterday's note (6/20/2023), there are no changes except as documented above.

## 2023-06-21 NOTE — PROGRESS NOTES
[facility] Nursing Notes     HD today x 3hrs per Dr MARIA VICTORIA Trent  Initiated at 1330 and ended 1610 (concluded 20mins early)     Pre HD assessment     Received patient very drowsy, but responsive sometimes  To verbal questions.   Lungs Clear. Vital Signs stable  No complains pre HD.     Edema - no edema     Access: -Right AVF, upper arm  Bruit/Thrill - strong and present  Used 17G for cannulation  (So far no bleeding issues)     No s/s of infection: no redness, no tenderness, no pus, no oozing of blood, warm to touch.     Intra HD    @1450 - Epoetin 64748 units given , verified by pharmacist Elsie Dahl   (3vials of 3000units and 1 vial of 4000units)    Vital signs stable  Able to rest during HD,     @1610 - patient feels unwell, very weak and cold  Able to return all the blood        Post HD     Concluded HD 20mins early due to weak, and felt very cold. Unable to complete time, mother at bedside, temp 97.3F  Called up Dr Trent and inform the situation, Dr agreed to conclude treatment    UF goal  achieved: 2100mLs  - 500 mL (200mls prime + 300mls rinseback)     Target Net UF : 1600mls Net   Dressing: dry and intact  Waited 15mins to press A&V site - so far no bleeding issues  Vital signs stable     Documented by  KENA GLASS RN    Report given to SHILPA Florez

## 2023-06-22 NOTE — DIETARY
Nutrition Services: Update   Day 8 of admit.  Lily Nicole is a 27 y.o. female with admitting DX of Anemia associated with acute blood loss [D62]  Sinus tachycardia seen on cardiac monitor [R00.0]    Pt remains on regular diet with 1000 ml fluid restriction. Per ADLs pt consistently eating <25%, last meal documented at 0%. This is day 8 of inadequate intake, consider nutrition support to meet needs within 24-48 hours.     Malnutrition Risk: Pt with severe malnutrition (see 6/15 note).      Recommendations/Plan:  Consideration of nutrition support if PO intake does not improve within 24-48 hrs.  Monitor weight.  Encourage intake of >50%  Document intake of all PO as % taken in ADL's to provide interdisciplinary communication across all shifts.   Monitor weight.    RD Following.

## 2023-06-22 NOTE — THERAPY
"Occupational Therapy   Initial Education      Patient Name: Lily Nicole  Age:  27 y.o., Sex:  female  Medical Record #: [id number]  Today's Date: 6/22/2023     Precautions  Precautions: Fall Risk  Comments: very frail    OT consult rec'd. Attempted to see for OT session, but pt lethargic and declined to participate despite education and encouragement. Mother reported pt having difficulty eating d/t fatigue and weakness. Provided with and edu on red built up  and demo'd use. Mother reported she believes pt will benefit from OT while in house when asked, but pt didn't respond. Will reattempt OT eval as appropriate/able.      06/22/23 1025   Prior Living Situation   Prior Services Continuous (24 Hour) Care Giving Family   Housing / Facility 1 Story House   Steps Into Home 0   Bathroom Set up Walk In Shower;Shower Chair   Equipment Owned Wheelchair;Tub / Shower Seat   Lives with - Patient's Self Care Capacity Parents   Comments Mother present and supportive; provided PLOF. Reports pt req assist with \"everything\".   Prior Level of ADL Function   Self Feeding Independent   Grooming / Hygiene Requires Assist   Bathing Requires Assist   Dressing Requires Assist   Toileting Requires Assist   Prior Level of IADL Function   Medication Management Dependent   Laundry Dependent   Kitchen Mobility Dependent   Finances Dependent   Home Management Dependent   Shopping Dependent   Prior Level Of Mobility Uses Wheel Chair for in Home Mobility; txfs from w/c to toilet and w/c to shower seat w/assist at baseline   Precautions   Precautions Fall Risk   Vitals   O2 Delivery Device None - Room Air   Pain 0 - 10 Group   Therapist Pain Assessment Post Activity Pain Same as Prior to Activity;During Activity;Nurse Notified;0   Cognition    Cognition / Consciousness X   Speech/ Communication Delayed Responses   Level of Consciousness   (lethargic)   Comments pleasantly refused OT eval d/t fatigue/lethargy. limited by volition   ADL " Assessment   Comments Provided with and educated on red built up  for utensils and grooming tools, as well as importance of EOB activity.   Activity Tolerance   Comments limited by lethargy and volition   Education Group   Education Provided Role of Occupational Therapist   Role of Occupational Therapist Patient Response Family;Patient;Acceptance;Explanation;Reinforcement Needed;No Learning Evidence

## 2023-06-22 NOTE — PROCEDURES
Nephrology/Hemodialysis note    Patient with ESRD/HD, SLE admitted with AVF prolonged bleeding, failure to thrive  S/p AVF angioplasty  Competed CT contrast chest abdomen  Seen and examined during dialysis  Not feeing well, weakness, drowsiness  UF 1-2 L  Lab results reviewed  Please see dialysis flow sheet for details

## 2023-06-22 NOTE — CARE PLAN
The patient is Unstable - High likelihood or risk of patient condition declining or worsening    Shift Goals  Clinical Goals: pain management, IV antibiotics, signs of bleeding, decreased nausea  Patient Goals: pain management, sleep  Family Goals: FAHAD    Patient A&Ox4, VSS on 5L NC. Refusing PO meds this shift. Continues on D10 IVF, blood sugar 86.     Problem: Risk for Bleeding  Goal: Patient will not experience bleeding as evidenced by normal blood pressure, stable hematocrit and hemoglobin levels and desired ranges for coagulation profiles  Outcome: Not Met    Patient hgb this shift 6.3, Notified Gabrielle, new order for hgb recheck resulted 6.7. New order for 1 U PRBC and premedicated with benadryl, consent signed by patient, to be administered when unit is ready.      Problem: Nutrition  Goal: Patient's nutritional and fluid intake will be adequate or improve  Outcome: Not Met    Patient eating minimal amounts of food this shift after encouragement.      Problem: Urinary Elimination  Goal: Establish and maintain regular urinary output  Outcome: Not Met    Anuric, no urine output this shift r/t ESRD patient on HD.     Problem: Mobility  Goal: Patient's capacity to carry out activities will improve  Outcome: Not Met    Patient refusing PT this shift, able to turn and reposition self, assisted as needed, generalized weakness and little motivation.

## 2023-06-22 NOTE — THERAPY
"Physical Therapy Contact Note    Patient Name: Lily Nicole  Age:  27 y.o., Sex:  female  Medical Record #: [id number]  Today's Date: 6/22/2023    PT consult received, evaluation attempted. PLOF/hx obtained primarily from Mom as pt reported feeling very fatigued and hungry but nauseous. Per Mom, pt is essentially WC bound at home and family provides assist for most everything. Pt spends days on couch and uses WC to assess restroom as needed. Pt declined to work with PT at this time due to fatigue. Inquired if pt/Mom felt pt would benefit from acute PT and Mom quickly responded \"yes\" with pt with no response. Will continue to attempt evaluation as able and pt agreeable to participate.    Ashly Zelaya, PT, DPT    [employer] [id number]  "

## 2023-06-22 NOTE — CARE PLAN
The patient is Unstable - High likelihood or risk of patient condition declining or worsening    Shift Goals  Clinical Goals: Pain management, IV antibiotics, monitor for signs of bleeding  Patient Goals: Pain managament, sleep, nausea control  Family Goals: FAHAD    Progress made toward(s) clinical / shift goals:    Problem: Pain - Standard  Goal: Alleviation of pain or a reduction in pain to the patient’s comfort goal  Outcome: Progressing  Note: PRN pain medications in use for pain control.       Problem: Skin Integrity  Goal: Skin integrity is maintained or improved  Outcome: Progressing     Problem: Fall Risk  Goal: Patient will remain free from falls  Outcome: Progressing     Problem: Psychosocial  Goal: Patient's level of anxiety will decrease  Outcome: Progressing  Note: Pt encouraged to voice feelings     Problem: Bowel Elimination  Goal: Establish and maintain regular bowel function  Outcome: Progressing       Patient is not progressing towards the following goals:

## 2023-06-22 NOTE — PROGRESS NOTES
"Nephrology Daily Progress Note    Date of Service  6/22/2023    Chief Complaint  27 y.o. female with a history of uncontrolled lupus, hypertension, ESRD on hemodialysis Monday Wednesday Friday who presented 6/14/2023 with prolonged bleeding from AV fistula and failure to thrive.    Interval Problem Update  6/15-patient had additional ultrafiltration treatment yesterday with 1 L removed.  Tolerated blood transfusion yesterday.  Complains of joint pain all over, and poor appetite.  She remains adamant she would never want \"tubes sticking out of me\" for enteral nutrition support.  6/18 -patient had ultrafiltration treatment yesterday with 0.5 L removed while receiving blood transfusion.  Denies chest pain, complains of some fatigue and shortness of breath.  She has lots of family members visiting her today.  6/19 -pulled NGT due to discomfort  Awaiting AVF revision due to episodes of prolonged bleeding post dialysis  Will postpone HD for tomorrow  6/22 -s/p AVF revision/angioplasty  -functioning well  No improvement in po food intake  Very weak, not ambulating  Mother at bed side  Anemia: low Hb level -scheduled blood transfusion  HD scheduled for tomorrow    Review of Systems  Review of Systems   Constitutional:  Positive for malaise/fatigue and weight loss. Negative for chills and fever.        Poor po intake  Very low energy level   HENT:  Negative for congestion, hearing loss and sinus pain.    Eyes: Negative.    Respiratory:  Negative for cough, hemoptysis, shortness of breath and wheezing.    Cardiovascular:  Negative for chest pain, palpitations, orthopnea and leg swelling.   Gastrointestinal:  Positive for abdominal pain and nausea. Negative for diarrhea and vomiting.   Skin: Negative.    Neurological:  Positive for weakness.   All other systems reviewed and are negative.       Physical Exam  Temp:  [36.3 °C (97.4 °F)-37.2 °C (99 °F)] 36.6 °C (97.8 °F)  Pulse:  [80-98] 81  Resp:  [15-22] 16  BP: " (102-137)/(59-68) 127/64  SpO2:  [93 %-99 %] 93 %    Physical Exam  Vitals and nursing note reviewed.   Constitutional:       General: She is not in acute distress.     Appearance: She is well-developed. She is cachectic. She is ill-appearing.   HENT:      Head: Normocephalic and atraumatic.      Nose: Nose normal.      Mouth/Throat:      Mouth: Mucous membranes are moist.      Pharynx: Oropharynx is clear.   Eyes:      Conjunctiva/sclera: Conjunctivae normal.      Pupils: Pupils are equal, round, and reactive to light.   Neck:      Thyroid: No thyromegaly.   Cardiovascular:      Rate and Rhythm: Normal rate and regular rhythm.      Pulses: Normal pulses.      Heart sounds: Normal heart sounds.      No friction rub. No gallop.   Pulmonary:      Effort: Pulmonary effort is normal. No respiratory distress.      Breath sounds: Normal breath sounds. No wheezing, rhonchi or rales.   Abdominal:      General: Bowel sounds are normal. There is no distension.      Palpations: Abdomen is soft. There is no mass.      Tenderness: There is abdominal tenderness. There is no guarding.   Musculoskeletal:      Cervical back: Normal range of motion and neck supple.      Right lower leg: No edema.      Left lower leg: No edema.   Skin:     General: Skin is warm and dry.      Coloration: Skin is pale.      Findings: No erythema or rash.   Neurological:      General: No focal deficit present.      Mental Status: She is alert and oriented to person, place, and time.   Psychiatric:         Mood and Affect: Mood normal.         Behavior: Behavior normal.         Thought Content: Thought content normal.         Judgment: Judgment normal.     Dialysis access: Right upper arm AV fistula, patent bruit and thrill    Fluids    Intake/Output Summary (Last 24 hours) at 6/22/2023 1302  Last data filed at 6/21/2023 1620  Gross per 24 hour   Intake 500 ml   Output 2100 ml   Net -1600 ml         Laboratory  Labs reviewed, pertinent labs below.  Recent  Labs     06/20/23  0217 06/21/23  0315 06/22/23  1023   WBC 13.5* 8.1 4.3*   RBC 3.75* 3.09* 2.78*   HEMOGLOBIN 8.8* 7.3* 6.4*   HEMATOCRIT 28.8* 23.8* 21.9*   MCV 76.8* 77.0* 78.8*   MCH 23.5* 23.6* 23.0*   MCHC 30.6* 30.7* 29.2*   RDW 65.5* 66.4* 71.2*   PLATELETCT 97* 69* 104*       Recent Labs     06/20/23  0217 06/21/23  0315 06/22/23  1023   SODIUM 130* 134* 134*   POTASSIUM 4.5 4.6 4.4   CHLORIDE 96 99 100   CO2 23 26 26   GLUCOSE 78 82 85   BUN 38* 26* 24*   CREATININE 5.07* 3.20* 2.65*   CALCIUM 7.9* 7.8* 7.9*       Recent Labs     06/19/23  1551   APTT 32.6   INR 1.43*                 Imaging interpreted by radiologist. Imaging reports reviewed with pertinent findings below  CT-CHEST,ABDOMEN,PELVIS WITH   Final Result      1.  No definite CT evidence for mesenteric ischemia or necrosis.   2.  Findings consistent with bilateral multifocal pneumonitis.   3.  Cardiomegaly.   4.  Enlargement of the main pulmonary artery which is suggestive of pulmonary arterial hypertension.   5.  Hepatomegaly.      6.  Splenomegaly.   7.  Status post cholecystectomy.   8.  Enlarged mediastinal, axillary and retroperitoneal lymph nodes of uncertain etiology and significance. Findings appear similar to the previous exam.   9.  Distal right subclavian vein metallic stent.   10.  Diffuse anasarca.      IR-US GUIDED PIV   Final Result    Ultrasound-guided PERIPHERAL IV INSERTION performed by    qualified nursing staff as above.      MC-RPATBLL-5 VIEW   Final Result         Feeding tube with tip projecting over the expected area of the first portion duodenum.      EC-ECHOCARDIOGRAM COMPLETE W/O CONT   Final Result      DX-ABDOMEN FOR TUBE PLACEMENT   Final Result      Feeding tube tip at the distal stomach.      These findings were discussed with MARLENE BRENNER on 6/18/2023 5:01 PM.         DX-ABDOMEN FOR TUBE PLACEMENT   Final Result         Feeding tube with tip projecting over the expected area of the first portion duodenum.       IR-EXTREMITY ANGIOGRAM-UNILATERAL RIGHT    (Results Pending)         Current Facility-Administered Medications   Medication Dose Route Frequency Provider Last Rate Last Admin    ampicillin/sulbactam (UNASYN) 3 g in  mL IVPB  3 g Intravenous Q24HRS Raul Anthony M.D.        dextrose 10% infusion   Intravenous Continuous Raul Anthony M.D. 50 mL/hr at 06/22/23 1253 New Bag at 06/22/23 1253    bisacodyl (DULCOLAX) suppository 10 mg  10 mg Rectal DAILY Raul Anthoyn M.D.   10 mg at 06/21/23 1720    [START ON 6/23/2023] epoetin roque (EPOGEN/PROCRIT) injection 10,000 Units  10,000 Units Intravenous MO, WE + FR Melinda Trent M.D.        acetaminophen (Tylenol) tablet 650 mg  650 mg Oral Q4HRS PRN Joel Harrison M.D.        acetaminophen (TYLENOL) suppository 650 mg  650 mg Rectal Q4HRS PRN Joel Harrison M.D.   650 mg at 06/20/23 0914    OLANZapine (ZYPREXA) tablet 2.5 mg  2.5 mg Oral Q EVENING Raul Anthony M.D.        dronabinol (MARINOL) capsule 2.5 mg  2.5 mg Oral BEFORE LUNCH AND DINNER Raul Anthony M.D.        folic acid (FOLVITE) tablet 1 mg  1 mg Oral DAILY Raul Anthony M.D.        hydroxychloroquine (Plaquenil) tablet 200 mg  200 mg Oral QAM Raul Anthony M.D.        senna-docusate (PERICOLACE or SENOKOT S) 8.6-50 MG per tablet 2 Tablet  2 Tablet Oral BID Raul Anthony M.D.        And    polyethylene glycol/lytes (MIRALAX) PACKET 1 Packet  1 Packet Oral QDAY PRN Raul Anthony M.D.        And    magnesium hydroxide (MILK OF MAGNESIA) suspension 30 mL  30 mL Oral QDAY PRN Raul Anthony M.D.        And    bisacodyl (DULCOLAX) suppository 10 mg  10 mg Rectal QDAY PRN Raul Anthony M.D.        ondansetron (ZOFRAN ODT) dispertab 4 mg  4 mg Oral Q4HRS PRN aRul Anthony M.D.        therapeutic multivitamin-minerals (THERAGRAN-M) tablet 1 Tablet  1 Tablet Oral DAILY Raul Anthony M.D.        zinc sulfate (ZINCATE) capsule 220 mg  220 mg Oral  DAILY Raul Anthony M.D.        escitalopram (Lexapro) tablet 10 mg  10 mg Oral DAILY Raul Anthony M.D.        amLODIPine (NORVASC) tablet 10 mg  10 mg Oral Q DAY Selam Stevenson M.D.   10 mg at 06/21/23 0410    cloNIDine (CATAPRES) 0.2 MG/24HR patch 1 Patch  1 Patch Transdermal Q7 DAYS Raul Anthony M.D.   1 Patch at 06/19/23 1538    LORazepam (ATIVAN) injection 0.5 mg  0.5 mg Intravenous Q4HRS PRN Raul Anthony M.D.   0.5 mg at 06/22/23 1005    HYDROmorphone (Dilaudid) injection 0.5 mg  0.5 mg Intravenous Q4HRS PRN Raul Anthony M.D.   0.5 mg at 06/22/23 0804    pantoprazole (Protonix) injection 40 mg  40 mg Intravenous DAILY Selam Stevenson M.D.   40 mg at 06/22/23 1006    thiamine (B-1) IVPB 100 mg in 100 mL D5W (premix)  100 mg Intravenous DAILY Selam Stevenson M.D. 200 mL/hr at 06/22/23 1051 100 mg at 06/22/23 1051    sodium chloride (OCEAN) 0.65 % nasal spray 2 Spray  2 Spray Nasal Q2HRS PRN Raul Anthony M.D.        dexamethasone (DECADRON) injection 0.76 mg  0.76 mg Intravenous DAILY Raul Anthony M.D.   0.76 mg at 06/21/23 0410    mycophenolate (CELLCEPT) 200 MG/ML susp 500 mg  500 mg Oral QAM Selam Stevenson M.D.   500 mg at 06/22/23 1053    hydrALAZINE (APRESOLINE) injection 10 mg  10 mg Intravenous Q6HRS PRN Raul Anthony M.D.   10 mg at 06/19/23 1640    LORazepam (ATIVAN) injection 0.5 mg  0.5 mg Intravenous TID Raul Anthony M.D.   0.5 mg at 06/22/23 1000    dextrose 10 % BOLUS 25 g  25 g Intravenous Q15 MIN PRN Teddy Golden M.D. 500 mL/hr at 06/21/23 1029 25 g at 06/21/23 1029    NS (BOLUS) infusion 250 mL  250 mL Intravenous DIALYSIS PRN Navin Metz M.D.        lidocaine (XYLOCAINE) 1 % injection 1 mL  1 mL Intradermal PRN Navin Metz M.D.   1 mL at 06/21/23 1330    ondansetron (ZOFRAN) syringe/vial injection 4 mg  4 mg Intravenous Q4HRS PRN Raul Anthony M.D.   4 mg at 06/22/23 1005         Assessment/Plan  27 y.o. female with a  history of uncontrolled lupus, hypertension, ESRD on hemodialysis Monday Wednesday Friday who presented 6/14/2023 with prolonged bleeding from AV fistula and failure to thrive.     1.ESRD/HD on MWF schedule     Will dialyze tomorrow     2.  Dialysis access: RUE AVF -s/p revision/angioplasty -functioning well     3. Anemia due to ESRD -low Hb level -on Epogen with HD      Scheduled blood transfusion     4.  Failure to thrive , severe protein malnutrition -no improvement       Reconsider TF's, continue protein supplementation, boosts       No renal diet restrictions     5.  Electrolytes; hyponatremia -mild improving -limit free water,  encourage protein intake    6.  HTN: BP well controlled    Recs:  No need for emergent dialysis today  Continue HD on MWF schedule  Monitor CBC, BMP, albumin  Consider cortrack -TF'x  Limit free water  Prognosis grim  Patient critically ill  Patient condition d/w Mother  Will follow

## 2023-06-22 NOTE — DISCHARGE PLANNING
HTH/SCP TCN chart review completed. Collaborated with ALICIA Vera. Current discharge considerations are home with mother assist, per patient and patient's mother's preference. No further TCN needs.      TCN will continue to follow and collaborate with discharge planning team as additional post acute needs arise. Thank you.     Completed:  Awaiting PT/OT recommendations   Pt declined to work with therapy secondary fatigue - 6/22  Choice obtained: none  [facility] referral not sent, pt declined [facility] services 6/15

## 2023-06-22 NOTE — PROGRESS NOTES
[facility] Internal Medicine Daily Progress Note    Date of Service  6/22/2023    [facility] Team: R IM Orange Team   Attending: Joel Harrison M.d.  Senior Resident: Dr. Kt Deras  Intern:  Dr. Raul Anthony  Contact Number: [phone]    Chief Complaint  Lily Nicole is a 27 y.o. female admitted 6/14/2023 with Acute blood loss from right arm AV fistula    Hospital Course  Lily Nicole is a 27 y.o. female with significant medical history of SLE glomerulonephritis syndrome with  ESRD on hemodialysis (on plaquenil, mycophenolate, prednisone), malnutrition, seizure associated with hypoglycemia, HTN, constipation with chronic opioid use, migraines, pulmonary hypertension on 5LNC who presented 6/14/2023 with acute blood loss anemia from her AV fistula (placed 11-12 yrs ago) while at the dialysis center with hemoglobin 6.1. Patient transfused 1 uPRBC irradiated with dialysis and benadryl 50mg IV due to history of shortness of breath associated with transfusions. Hemoglobin stabilized at 9.1.   Her severe malnutrition status has been discussed. GI and General Surgery have been called regarding placing a G tube or PEG, at this time recs made for further studies and possible NG tube placement given her current condition she has a high risk of bleeding and difficulty healing.  Patient could not undergo upper GI series as could not swallow barium.    Patient is also having hypoglycemic episodes associated with low appetite.  Dobbhoff was placed and was removed as patient could not tolerate.    Palliative care recs for referral to outpatient/home pallaitive care team on discharge.     6/19 bleeding from AV fistula, Hgb holding 11.8. Vascular surgery consulted and patient underwent right fistulogram for treatment of central venous stenosis with venoplasty. Follow up with Dr. Dotson outpatient in 3-6 months to evaluate for central venous stenosis recurrence.     Patient continues to have difficulty consuming  oral intake due to nausea.  Patient is not want placement of NG tube.      Patient was spiking fevers and found to have bacteremia with E. coli.  Continue patient on with Zosyn.  CT thorax abdomen pelvis without evidence of abscess or abdominal infection.  Chest does show bilateral pulmonary infiltration concerning for aspiration pneumonitis.  Discussion with patient's mother regarding TPN and risk of infection.  As patient continues to refuse NG tube placement, TPN may need to be started pending negative blood cultures for placement of a line.    Interval Problem Update  No acute events overnight  This am patient was fatigued but more alert, still having nausea and pain.  Per a.m. nurse patient did not want IV antibiotic.  Discussed with patient at bedside need to continue antibiotics due to current active infection.  Patient agreeable to start IV medications again and stated that she still felt nauseous and had pain.  Unable to state exactly 1 source of pain, states pain is everywhere as previously.  -Blood culture returned there are E. coli sensitive to Zosyn  -Repeat blood cultures collected today  -Hemoglobin 6.4 with repeat 6.7, awaiting blood transfusion today at bedside.  Per blood bank needs new COD-A and will obtain irradiated blood. Discussed with nephrology patient to receive 1 unit at bedside today, possibly receive another unit at dialysis tomorrow  -Nursing showed patient coretrac at which patient cried and refused placement    I have discussed this patient's plan of care and discharge plan at IDT rounds today with Case Management, Nursing, Nursing leadership, and other members of the IDT team.    Consultants/Specialty  general surgery, GI, nephrology, psychiatry, and psychology and palliative care    Code Status  DNAR, I OK    Disposition  The patient is not medically cleared for discharge to home or a post-acute facility.      I have placed the appropriate orders for post-discharge needs.    Review  of Systems  Review of Systems   Constitutional:  Positive for malaise/fatigue and weight loss.   Gastrointestinal:  Positive for nausea. Negative for vomiting.   Genitourinary:         Anuric   Musculoskeletal:  Positive for back pain. Negative for joint pain, myalgias and neck pain.   Neurological:  Positive for weakness. Negative for headaches.   Psychiatric/Behavioral:  The patient is nervous/anxious.         Physical Exam  Temp:  [36.4 °C (97.5 °F)-37.2 °C (99 °F)] 36.9 °C (98.5 °F)  Pulse:  [80-95] 87  Resp:  [15-18] 17  BP: (111-137)/(59-68) 111/64  SpO2:  [93 %-100 %] 100 %    Physical Exam  Constitutional:       General: She is not in acute distress.     Appearance: She is ill-appearing. She is not toxic-appearing or diaphoretic.      Comments: Cachectic with temporal wasting, fatigued.  Appears more alert today.   HENT:      Head: Normocephalic and atraumatic.      Right Ear: External ear normal.      Left Ear: External ear normal.      Nose: Nose normal.      Mouth/Throat:      Mouth: Mucous membranes are dry.   Eyes:      General: No scleral icterus.        Right eye: No discharge.         Left eye: No discharge.      Extraocular Movements: Extraocular movements intact.      Conjunctiva/sclera: Conjunctivae normal.   Cardiovascular:      Rate and Rhythm: Normal rate and regular rhythm.      Pulses: Normal pulses.      Heart sounds: Normal heart sounds. No murmur heard.  Pulmonary:      Effort: Pulmonary effort is normal. No respiratory distress.      Breath sounds: Normal breath sounds.   Abdominal:      General: Abdomen is flat. Bowel sounds are normal. There is no distension.      Palpations: Abdomen is soft.      Tenderness: There is no abdominal tenderness.   Musculoskeletal:         General: Normal range of motion.      Cervical back: Normal range of motion.      Right lower leg: No edema.      Left lower leg: No edema.      Comments: Right arm with a/v fistula without bleeding, bandaging c/d/i    Skin:     General: Skin is warm and dry.      Comments: Skin indicative of scleroderma of hands and face   Neurological:      General: No focal deficit present.      Mental Status: She is alert and oriented to person, place, and time.      Motor: Weakness present.   Psychiatric:         Mood and Affect: Mood normal.         Behavior: Behavior normal.         Thought Content: Thought content normal.         Judgment: Judgment normal.         Fluids  No intake or output data in the 24 hours ending 06/22/23 1730    Laboratory  Recent Labs     06/20/23  0217 06/21/23  0315 06/22/23  1023 06/22/23  1417   WBC 13.5* 8.1 4.3*  --    RBC 3.75* 3.09* 2.78*  --    HEMOGLOBIN 8.8* 7.3* 6.4* 6.7*   HEMATOCRIT 28.8* 23.8* 21.9* 22.2*   MCV 76.8* 77.0* 78.8*  --    MCH 23.5* 23.6* 23.0*  --    MCHC 30.6* 30.7* 29.2*  --    RDW 65.5* 66.4* 71.2*  --    PLATELETCT 97* 69* 104*  --      Recent Labs     06/20/23 0217 06/21/23  0315 06/22/23  1023   SODIUM 130* 134* 134*   POTASSIUM 4.5 4.6 4.4   CHLORIDE 96 99 100   CO2 23 26 26   GLUCOSE 78 82 85   BUN 38* 26* 24*   CREATININE 5.07* 3.20* 2.65*   CALCIUM 7.9* 7.8* 7.9*                   Imaging  CT-CHEST,ABDOMEN,PELVIS WITH   Final Result      1.  No definite CT evidence for mesenteric ischemia or necrosis.   2.  Findings consistent with bilateral multifocal pneumonitis.   3.  Cardiomegaly.   4.  Enlargement of the main pulmonary artery which is suggestive of pulmonary arterial hypertension.   5.  Hepatomegaly.      6.  Splenomegaly.   7.  Status post cholecystectomy.   8.  Enlarged mediastinal, axillary and retroperitoneal lymph nodes of uncertain etiology and significance. Findings appear similar to the previous exam.   9.  Distal right subclavian vein metallic stent.   10.  Diffuse anasarca.      IR-US GUIDED PIV   Final Result    Ultrasound-guided PERIPHERAL IV INSERTION performed by    qualified nursing staff as above.      [id number] VIEW   Final Result         Feeding  tube with tip projecting over the expected area of the first portion duodenum.      EC-ECHOCARDIOGRAM COMPLETE W/O CONT   Final Result      DX-ABDOMEN FOR TUBE PLACEMENT   Final Result      Feeding tube tip at the distal stomach.      These findings were discussed with MARLENE BRENNER on 6/18/2023 5:01 PM.         DX-ABDOMEN FOR TUBE PLACEMENT   Final Result         Feeding tube with tip projecting over the expected area of the first portion duodenum.      IR-EXTREMITY ANGIOGRAM-UNILATERAL RIGHT    (Results Pending)        Assessment/Plan  Problem Representation:    * Anemia associated with acute blood loss- (present on admission)  Assessment & Plan  Presents to the ED with hemoglobin 6.1, blood loss from AV fistula site at dialysis center.  Per patient she had blood loss at dialysis last Friday for which she came to the ED and required a stitch to be placed.  Also had blood loss from AV fistula site at dialysis on Monday.  History of having transfusion reactions requiring Benadryl and getting a blood transfusion with dialysis.  Anemia likely due to acute blood loss from AV fistula, coagulopathy labs show elevated INR 1.34 with TEG findings largely normal with no indication for FFP or cryo.  -Nephrology consulted, appreciate recs: plan to continue dialysis routine MWF  -Ativan prn for anxiety for dialysis (d/c benadryl IV given patient getting ativan IV)  -Epistaxis 6/16 with Hgb of 6.4, transfused 1upRBC with dialysis  -AV fistula bleed 6/19 - see AV fistula  -6/22 no active bleed but hemoglobin 6.4 with repeat 6.7 likely in relation to ESRD and AV fistula bleed from day before  -1 unit transfusion today  -qam H&H     Bacteremia of undetermined etiology  Assessment & Plan  6/20 BCXx2 gram negative rods, unknown etiology for infection. No skin ulcers, does have bleeding/open wound during fistula bleeds. Given gram (-) rods, concern for abdominal source but CT abd/pelvis without evidence of abscess or inflammation.  Also no abd pain on examination.  -Zosyn started 6/21 with decrease in fever  -Collected 48hrs bcx  - continue on with Zosyn given blood cultures positive for E. coli sensitive to Zosyn      Fever  Assessment & Plan  Resolved, no fevers  Fever night and am of 06/19-06/20  Leukocytosis, tachycardia without an identifiable infectious source. No sacral ulcers, no warm/swollen/painful joints. Procal 42.2, lactic acid 2.4. ESR 50, CRP 11.93  -tylenol suppository for fever  -BCX 6/20 positive for gram - rods  -two pIV access will obtain second access   -zosyn 6/21 start, switch to Unasyn on 622.  Blood cultures returned with E. coli sensitive to Zosyn and returned antibiotic course to Zosyn    Severe protein-calorie malnutrition (HCC)- (present on admission)  Assessment & Plan  Patient does not want NG tube but has changed her mind regarding feeding tube. High surgical candidate therefore further studies for cause of nausea pending.  -Nutrition consult  -Vitamins, copper low - replete with tube feeds if possible, multivitamin po  -Low Zn replete with 50mg daily  -prealbumin 5  -Boost with meals  -Calorie count  -Gi consulted, appreciate recs for marinol. Working up adrenal insufficiency  -Psychiatry consulted recs made for scheduled ativan and benadryl, remeron switch to lexapro 20 mg, zyprexa continue at current dose.  -Psychology consult placed  -palliative care consult placed   -dobhoff placed 6/19, couldn't tolerate overnight and removed  - Echo EF 45%, moderate concentric LVH, global hypokinesis, G1 diastolic dysfunction, mild aortic insufficiency   -ACTH slight elevation, cortisol within range: doesn't support adrenal insufficiency as cause to n/v  -TPN on hold given bacteremia   -Coretrak was shown to patient however she refused  -Continue with D10 50 cc/h    HTN (hypertension)- (present on admission)  Assessment & Plan  We will monitor blood pressure, patient uses clonidine patch/p.o. and amlodipine 10 mg as  needed  -Continue home atenolol 25 mg  -cont home clonidine patch  -hydralazine prn    Arteriovenous fistula, acquired (HCC)- (present on admission)  Assessment & Plan  No current bleeding  Coagulation studies show slight elevation INR 1.47, TEG no indication for FFT/cryo.  -Bleeding recurred 6/19 Vascular surgery Dr. Emerson took patient for right fistulogram for treatment of central venous stenosis with venoplasty.  -Outpatient followup with vascular surgeon  3-6 months  -fistula cleared for dialysis    Copper deficiency  Assessment & Plan  Nutrition input on diet    Vitamin A deficiency  Assessment & Plan  Nutrition input on diet    Heart failure with reduced ejection fraction (HCC)  Assessment & Plan   Echo EF 45%, moderate concentric LVH, global hypokinesis, G1 diastolic dysfunction, mild aortic insufficiency   -look at GDMT, patient currently having issues intaking po meds. controlling nausea    Fe deficiency anemia  Assessment & Plan  Fe panel with Fe 59, TIB and Fe% uncalculable, unsat Fe <17 likely due to ESRD and malnutrition  -epoetin with dialysis      Subclinical hypothyroidism  Assessment & Plan  T4 0.9 at lower end of range, TSH 9.130. Likely related to malnutrition rather than indicative of hypothyroidism.  Holding off on synthroid as given patient's malnutrition will not want to increase metabolism.     Disorder of electrolytes  Assessment & Plan  -monitor and replete as needed K>4, PO4>3, Mg>2    Metabolic alkalosis  Assessment & Plan  Resolved  Potential causes of nausea, patient does not have hyperkalemia or diarrhea etiology potentially associated with ESRD      Systemic lupus erythematosus with organ system involvement (HCC)- (present on admission)  Assessment & Plan  - Continue home Plaquenil, mycophenolate, prednisone    Hyponatremia- (present on admission)  Assessment & Plan  Improving to 134  Initially sodium 128 moderate hyponatremia, historically has mild to moderate with  lowest  120.  Etiology likely considerable due to malnutrition and ESRD  -Continues to receive D10  -1L fluid restriction per nephrology  -Continue to monitor    Chronic respiratory failure with hypoxia (HCC)- (present on admission)  Assessment & Plan  Home oxygen requirement 5LNC, no increased requirement on admission. Hx of pulmonary HTN  -monitor oxygen requirement    Pancytopenia (HCC)- (present on admission)  Assessment & Plan  Chronic, potential relation to SLE and medications  -continue to monitor   -coagulation labs sig for elevated INR 1.47         VTE prophylaxis: SCDs/TEDs and pharmacologic prophylaxis contraindicated due to bleeding risk    I have performed a physical exam and reviewed and updated ROS and Plan today (6/22/2023). In review of yesterday's note (6/21/2023), there are no changes except as documented above.

## 2023-06-23 NOTE — PROGRESS NOTES
"Palliative Care Advance Care Planning Progress Note    General: Patient is a 28yo F with complicated medical history stemming from her lupus that has led to many co morbidities including ESRD on hemodialysis, malnutrition, migraines, pulmonary hypertension, and HFrEF. Admitted for anemia exacerbated by AV fistula bleeding requiring blood transfusion. Palliative care consulted for assistance with goals of care.    Discussion: Patient seen at the bedside, she was awake but did not want to engage in much conversation, I asked how she was feeling and she stated she felt bad all over, asked if she over felt she was getting better and she did not respond. I offered to come back when her mom was here.     I visited the patients room again later in the morning and her mom was present and Lily was asleep. I asked how she felt her daughter was doing and she responded by saying she is much better and eating more. I followed up with asking if she thought she was ready to go home and she felt she was close. Sensing a disconnect with how her daughter really was doing I expressed my continue concerns with her poor nutrition, anemia, and frequent refusal of treatments like her IV abx this morning. Her mom did acknowledge those concerns as well as the \"infection in her blood\". I directed the conversation to what should we do if Lily did get home and was again failing in her health should we come back to the hospital and continue what we are doing now. Her mom responded with yes that she should come back. I shared my continued concern and offered considering comfort care if she were to fail at home. Her mom was initially reluctant but then was appropriately emotional and indicated that maybe we should look at not coming back to the hospital. To this scenario I offered that the medical team or case management could provide her with contact information about hospice that she could call and she shook her head that yes she would " appreciate that information.     The conversation concluded by expressing my hope she improves but also offered reassurance out team is available if things change.    Plan: Patient has a very complicated medical situation and has been difficult to engage in conversation about the future. Our team worries that she will continue to decline. If at anytime she desires to adopt a hospice philosophy of care the transition to hospice would be appropriate. I would encourage that if she becomes stable for discharge that the family is sent home with information on hospice incase they desire to enroll and not be readmitted back to the hospital. We were unable to address her pain symptoms but would encourage transitioning to PO medications in anticipation of going home if that remains the patients wishes.     Please call our team with questions and/or additional needs.    Total visit time was 20 minutes discussing advance care planning.    Total time spent in ACP discussion 20 minutes, which is separate from the time spent completing the evaluation and management visit.      I spent a total of 30 minutes reviewing medical records, direct face-to-face time with the patient and/or family, documentation and coordination of care. This is separate from the time spent on advance care planning, which is documented above.

## 2023-06-23 NOTE — THERAPY
Occupational Therapy Contact Note    Patient Name: Lily Nicole  Age:  27 y.o., Sex:  female  Medical Record #: [id number]  Today's Date: 6/23/2023    Attempted OT eval; continues to refuse to participate. Began education on additional DME/AD to assist with caregiver burden if needed. Will attempt OT eval 1 more time, but if continues to refuse will d/c OT consult.

## 2023-06-23 NOTE — CARE PLAN
The patient is Unstable - High likelihood or risk of patient condition declining or worsening    Shift Goals  Clinical Goals: Pain management, IV antibiotics, nausea control, comfort  Patient Goals: Pain control, adequate rest  Family Goals: POC    Progress made toward(s) clinical / shift goals:     Patient is not progressing towards the following goals:  Problem: Pain - Standard  Goal: Alleviation of pain or a reduction in pain to the patient’s comfort goal  6/23/2023 0313 by Yasmine SHERIFF R.N.  Outcome: Progressing  Note: PRN medications in place to help with pain management. Patient has Q4 dilaudid in board and that seems to relieve her to where she os able to rest comfortably. Patient does request dilaudid around the clock and her pain level prior is usually 8 or 9.     Problem: Risk for Bleeding  Goal: Patient will take measures to prevent bleeding and recognizes signs of bleeding that need to be reported immediately to a health care professional  Outcome: Progressing  Note: Patient educated on the signs and symptoms of bleeding. Patient reminded to tell staff immediatly if she notices any of these signs/symptoms.   Goal: Patient will not experience bleeding as evidenced by normal blood pressure, stable hematocrit and hemoglobin levels and desired ranges for coagulation profiles  Outcome: Progressing  Note: Patients vital signs have remained stable. Currently waiting on most recent Hgb to result. Patient understands that a hgb of of anything below 7 may require a blood transfusion.      Problem: Fall Risk  Goal: Patient will remain free from falls  Outcome: Progressing  Note: Patient has remained free of falls this shift. Instructed patient to use call light for help. Call light always placed within reach when leaving room. Patient's room has been cleared of clutter such as cords and extra chairs.      Problem: Psychosocial  Goal: Patient's level of anxiety will decrease  Outcome: Progressing  Note: Patient  encouraged to voice feelings.

## 2023-06-23 NOTE — DISCHARGE PLANNING
" HTH/SCP TCN Hannah MARTINEZ  chart review completed.   Collaborated with  Jody ( Jacqueline GROSSMAN)    Per chart review:  -Previous DC Planning notes by LEIXS Gonzalez RN on 6/20/2023 12:21 PM indicated CM confirmed with patient's mother that she does not want hospice for Daughter and she wants to take pt. Home.  - Previous TCN notes on 6/22 indicated current DC consideration is possible with Mother's assist, per patient and patient's Mother's preference.  --NEPHROLOGY Medicine following pt.   \" [address] [address]   Schedule: Mon, Wed, Fri   Time: 6:00 AM \"  ---Provider notes by Dr Raul Anthony M.D. on 6/22/2023 indicated  pt  is not medically cleared.    Awaiting medical clearance.  Will await Provider's DC disposition recommendations.  There are no additional TCN needs identified during this time  Kindly reach out to Hospital Case Management  DC Planning Team for any additional   TCN will continue to follow and collaborate with discharge planning team as additional post acute needs arise. Thank you.        Completed:  OT PT final  recs pending 6/23/2023  Choice obtained: NONE  [facility] confirmed 6/15/2023  Palliative Medicine consult  [facility] referral ( pt declined [facility] services 6/15/2023      "

## 2023-06-23 NOTE — THERAPY
Physical Therapy Contact Note    Patient Name: Lily Nicole  Age:  27 y.o., Sex:  female  Medical Record #: [id number]  Today's Date: 6/23/2023    Discussed missed therapy with RN.       06/23/23 1042   Treatment Variance   Reason For Missed Therapy Non-Medical - Patient Refused   Interdisciplinary Plan of Care Collaboration   IDT Collaboration with  Nursing;Occupational Therapist;Family / Caregiver   Patient Position at End of Therapy In Bed;Family / Friend in Room   Collaboration Comments Pt declined mobility, Mother present and encouraging but pt still declined.   Session Information   Date / Session Number  6/23 Attempted. ( refused)

## 2023-06-23 NOTE — CARE PLAN
The patient is Stable - Low risk of patient condition declining or worsening    Shift Goals  Clinical Goals: pain mgmt, HD  Patient Goals: manage pain  Family Goals: FAHAD    Progress made toward(s) clinical / shift goals:  Pain and anxiety improving with scheduled ativan and utilization of PRNs per pt. Currently in HD. PO intake improving minimally with mom encouragement and providing frequent snacks.    Patient is not progressing towards the following goals:

## 2023-06-23 NOTE — CONSULTS
"Brief Behavioral Health Care Note:    Name: Lily Nicole  MRN: [id number]  : 1995  Age: 27 y.o.  Date of assessment: 2023  PCP: Ella Matthew M.D.  Persons in attendance: Patient     Length of Intervention: 30 minutes    HPI:  Per medical record:Lily Nicole is a 27 y.o. female with significant medical history of SLE glomerulonephritis syndrome with  ESRD on hemodialysis (on plaquenil, mycophenolate, prednisone), malnutrition, seizure associated with hypoglycemia, HTN, constipation with chronic opioid use, migraines, pulmonary hypertension on 5LNC who presented 2023 with acute blood loss anemia from her AV fistula (placed 11-12 yrs ago). Patient was referred to Behavioral Health for psychotherapy session.    Psychotherapy Session Summary  Lily Nicole was seen lying on hospital bed, eyes closed, continues to be difficult to engage in the interview process. Patient's speech was slow and soft, difficult to hear or understand., often dosing off while talking throughout the session.  Clinician asked patient to repeat herself several times. Patient continues to present as weak, very thin, malnourished appearing. Patient maintained very little eye contact.      Clinician provided encouragement and support. Patient continued to insist that she did not want any procedures for feeding. Patient reports she is eating. Nutrition brought soup in during the session, but patient declined to eat stating, I will wait until my mother gets here\".   Patient appeared confused at time stating in response to a question about her plans, \"the cats will be adopted\". When asked for clarification she continued, \"we found the cats, we are going to have them adopted\".   Patient is not future focused regarding participating in life in any way, no plans for school, work, or engaging in any activities. Patient just says, \"I want to go home\".     At this time it does not appear patient is benefiting from " psychotherapy . Patient is challenged in her ability to engage due to her current medical condition.    Recommendations  Please order Speech to assess patients cognitive functioning.  2. Also recommend a capacity evaluation be ordered for health care decision making      Signing off    Thank you,    Aissatou Anderson, Ph.D., [facility]

## 2023-06-23 NOTE — PROGRESS NOTES
[facility] Internal Medicine Daily Progress Note    Date of Service  6/23/2023    [facility] Team: R IM Orange Team   Attending: Joel Harrison M.d.  Senior Resident: Dr. Kt Deras  Intern:  Dr. Raul Anthony  Contact Number: [phone]    Chief Complaint  Lily Nicole is a 27 y.o. female admitted 6/14/2023 with Acute blood loss from right arm AV fistula    Hospital Course  Lily Nicole is a 27 y.o. female with significant medical history of SLE glomerulonephritis syndrome with  ESRD on hemodialysis (on plaquenil, mycophenolate, prednisone), malnutrition, seizure associated with hypoglycemia, HTN, constipation with chronic opioid use, migraines, pulmonary hypertension on 5LNC who presented 6/14/2023 with acute blood loss anemia from her AV fistula (placed 11-12 yrs ago) while at the dialysis center with hemoglobin 6.1. Patient transfused 1 uPRBC irradiated with dialysis and benadryl 50mg IV due to history of shortness of breath associated with transfusions. Hemoglobin stabilized at 9.1.   Her severe malnutrition status has been discussed. GI and General Surgery have been called regarding placing a G tube or PEG, at this time recs made for further studies and possible NG tube placement given her current condition she has a high risk of bleeding and difficulty healing.  Patient could not undergo upper GI series as could not swallow barium.    Patient is also having hypoglycemic episodes associated with low appetite.  Dobbhoff was placed and was removed as patient could not tolerate.  Palliative care recs for referral to outpatient/home pallaitive care team on discharge.      6/19 bleeding from AV fistula, Hgb holding 11.8. Vascular surgery consulted and patient underwent right fistulogram for treatment of central venous stenosis with venoplasty. Follow up with Dr. Dotson outpatient in 3-6 months to evaluate for central venous stenosis recurrence.      Patient continues to have difficulty consuming  oral intake due to nausea.  Patient is not want placement of NG tube.       Patient was spiking fevers and found to have bacteremia with E. coli.  Continue patient on with Zosyn.  CT thorax abdomen pelvis without evidence of abscess or abdominal infection.  Chest does show bilateral pulmonary infiltration concerning for aspiration pneumonitis.  Discussion with patient's mother regarding TPN and risk of infection.  As patient continues to refuse NG tube placement, TPN may need to be started pending negative blood cultures for placement of a line, however pt has poor access and need for preservation of blood vessel for possible future need for new dialysis access.    Interval Problem Update  No acute events overnight  This am patient was fatigued but more alert, still having nausea and all over pain (unable to state exactly source pain). Patient not taking PO meds, is taking IV medications including antibiotics.  Poor PO intake, but patient states she will try to drink some ensure that her mom brings as well as chicken noodle soup.     - repeat blood culture pending, on zosyn for e coli bacteremia.    - Hgb 6.4 s/p 1UPRBC --> 8.6 today   - plans for HD today   - last BM 2 days ago    I have discussed this patient's plan of care and discharge plan at IDT rounds today with Case Management, Nursing, Nursing leadership, and other members of the IDT team.    Consultants/Specialty  general surgery, GI, nephrology, psychiatry, and psychology and palliative care    Code Status  DNAR, I OK    Disposition  The patient is not medically cleared for discharge to home or a post-acute facility.    I have placed the appropriate orders for post-discharge needs.    Review of Systems  Review of Systems   Constitutional:  Positive for malaise/fatigue and weight loss.   Respiratory:  Negative for cough and shortness of breath.    Cardiovascular:  Negative for chest pain.   Gastrointestinal:  Positive for nausea. Negative for vomiting.    Genitourinary:         Anuric   Musculoskeletal:  Positive for back pain. Negative for joint pain, myalgias and neck pain.   Neurological:  Positive for weakness. Negative for headaches.      Physical Exam  Temp:  [36.3 °C (97.4 °F)-37.2 °C (98.9 °F)] 36.4 °C (97.5 °F)  Pulse:  [80-97] 91  Resp:  [14-24] 14  BP: ()/(64-84) 107/70  SpO2:  [88 %-100 %] 92 %    Physical Exam  Constitutional:       General: She is not in acute distress.     Appearance: She is ill-appearing. She is not toxic-appearing or diaphoretic.      Comments: Cachectic with temporal wasting, fatigued.     HENT:      Head: Normocephalic and atraumatic.      Right Ear: External ear normal.      Left Ear: External ear normal.      Nose: Nose normal.      Mouth/Throat:      Mouth: Mucous membranes are dry.   Eyes:      General: No scleral icterus.        Right eye: No discharge.         Left eye: No discharge.      Extraocular Movements: Extraocular movements intact.      Conjunctiva/sclera: Conjunctivae normal.   Cardiovascular:      Rate and Rhythm: Normal rate and regular rhythm.      Pulses: Normal pulses.      Heart sounds: Normal heart sounds. No murmur heard.  Pulmonary:      Effort: Pulmonary effort is normal. No respiratory distress.      Breath sounds: Normal breath sounds.   Abdominal:      General: Abdomen is flat. There is no distension.      Palpations: Abdomen is soft.      Tenderness: There is no abdominal tenderness.   Musculoskeletal:         General: Normal range of motion.      Cervical back: Normal range of motion.      Right lower leg: No edema.      Left lower leg: No edema.      Comments: Right arm with a/v fistula without bleeding, bandaging c/d/i   Skin:     General: Skin is warm and dry.      Comments: Skin indicative of scleroderma of hands and face   Neurological:      General: No focal deficit present.      Mental Status: She is alert and oriented to person, place, and time.      Motor: Weakness present.    Psychiatric:         Mood and Affect: Mood normal.         Behavior: Behavior normal.         Fluids    Intake/Output Summary (Last 24 hours) at 6/23/2023 1249  Last data filed at 6/23/2023 1100  Gross per 24 hour   Intake 445 ml   Output --   Net 445 ml       Laboratory  Recent Labs     06/21/23  0315 06/22/23  1023 06/22/23  1417 06/23/23  0402   WBC 8.1 4.3*  --  5.5   RBC 3.09* 2.78*  --  3.47*   HEMOGLOBIN 7.3* 6.4* 6.7* 8.6*   HEMATOCRIT 23.8* 21.9* 22.2* 27.9*   MCV 77.0* 78.8*  --  80.4*   MCH 23.6* 23.0*  --  24.8*   MCHC 30.7* 29.2*  --  30.8*   RDW 66.4* 71.2*  --  67.1*   PLATELETCT 69* 104*  --  130*       Recent Labs     06/21/23  0315 06/22/23  1023 06/23/23  0402   SODIUM 134* 134* 132*   POTASSIUM 4.6 4.4 4.3   CHLORIDE 99 100 97   CO2 26 26 25   GLUCOSE 82 85 71   BUN 26* 24* 31*   CREATININE 3.20* 2.65* 3.43*   CALCIUM 7.8* 7.9* 8.2*                     Imaging  CT-CHEST,ABDOMEN,PELVIS WITH   Final Result      1.  No definite CT evidence for mesenteric ischemia or necrosis.   2.  Findings consistent with bilateral multifocal pneumonitis.   3.  Cardiomegaly.   4.  Enlargement of the main pulmonary artery which is suggestive of pulmonary arterial hypertension.   5.  Hepatomegaly.      6.  Splenomegaly.   7.  Status post cholecystectomy.   8.  Enlarged mediastinal, axillary and retroperitoneal lymph nodes of uncertain etiology and significance. Findings appear similar to the previous exam.   9.  Distal right subclavian vein metallic stent.   10.  Diffuse anasarca.      IR-US GUIDED PIV   Final Result    Ultrasound-guided PERIPHERAL IV INSERTION performed by    qualified nursing staff as above.      [id number] VIEW   Final Result         Feeding tube with tip projecting over the expected area of the first portion duodenum.      EC-ECHOCARDIOGRAM COMPLETE W/O CONT   Final Result      DX-ABDOMEN FOR TUBE PLACEMENT   Final Result      Feeding tube tip at the distal stomach.      These findings were  discussed with MARLENE BRENNER on 6/18/2023 5:01 PM.         DX-ABDOMEN FOR TUBE PLACEMENT   Final Result         Feeding tube with tip projecting over the expected area of the first portion duodenum.      IR-EXTREMITY ANGIOGRAM-UNILATERAL RIGHT    (Results Pending)          Assessment/Plan  Problem Representation:    * Anemia associated with acute blood loss- (present on admission)  Assessment & Plan  Presents to the ED with hemoglobin 6.1, blood loss from AV fistula site at dialysis center.  Per patient she had blood loss at dialysis last Friday for which she came to the ED and required a stitch to be placed.  Also had blood loss from AV fistula site at dialysis on Monday.  History of having transfusion reactions requiring Benadryl and getting a blood transfusion with dialysis.  Anemia likely due to acute blood loss from AV fistula, coagulopathy labs show elevated INR 1.34 with TEG findings largely normal with no indication for FFP or cryo.  -Nephrology consulted, appreciate recs: plan to continue dialysis routine MWF  -Ativan prn for anxiety for dialysis (d/c benadryl IV given patient getting ativan IV)  -Epistaxis 6/16 with Hgb of 6.4, transfused 1upRBC with dialysis  -AV fistula bleed 6/19 - see AV fistula  -6/22 no active bleed but hemoglobin 6.4 with repeat 6.7 likely in relation to ESRD and AV fistula bleed from day before. 1 unit transfusion 6/22  -Hgb 8.6 today, qam H&H     Severe protein-calorie malnutrition (HCC)- (present on admission)  Assessment & Plan  -dobhoff placed 6/19, couldn't tolerate overnight and removed  Patient does not want NG tube. Poor access and need to preserve vasculature for possible need for future access, so no TPN at this time (and was on hold due to bacteremia)  -Encourage pt and mom for pt's increased oral intake, pt taking ensure and will try chicken noodle soup today  -Nutrition consult  -Vitamins, copper low, multivitamin po  -Low Zn replete with 50mg daily  -prealbumin  5  -Boost with meals  -Calorie count  -Gi consulted, on marinol.   -Psychiatry consulted recs made for scheduled ativan and benadryl, remeron switch to lexapro 20 mg, zyprexa continue at current dose.  -Psychology consult placed  -palliative care consult placed   - Echo EF 45%, moderate concentric LVH, global hypokinesis, G1 diastolic dysfunction, mild aortic insufficiency   -ACTH slight elevation, cortisol within range: doesn't support adrenal insufficiency as cause to n/v  -Continue with D10 50 cc/h    Bacteremia of undetermined etiology  Assessment & Plan  6/20 BCXx2 gram negative rods, unknown etiology for infection. No skin ulcers, does have bleeding/open wound during fistula bleeds. Given gram (-) rods, concern for abdominal source but CT abd/pelvis without evidence of abscess or inflammation. Also no abd pain on examination.  -Zosyn started 6/21 with decrease in fever  -Collected 48hrs bcx  - continue on with Zosyn given blood cultures positive for E. coli sensitive to Zosyn and imaging finding concerning for aspiration pna    Systemic lupus erythematosus with organ system involvement (HCC)- (present on admission)  Assessment & Plan  - Continue home Plaquenil, mycophenolate, prednisone    Arteriovenous fistula, acquired (HCC)- (present on admission)  Assessment & Plan  No current bleeding  Coagulation studies show slight elevation INR 1.47, TEG no indication for FFT/cryo.  -Bleeding recurred 6/19 Vascular surgery Dr. Emerson took patient for right fistulogram for treatment of central venous stenosis with venoplasty.  -Outpatient followup with vascular surgeon  3-6 months  -fistula cleared for dialysis    Fever  Assessment & Plan  Resolved, no fevers  Fever night and am of 06/19-06/20  Leukocytosis, tachycardia without an identifiable infectious source. No sacral ulcers, no warm/swollen/painful joints. Procal 42.2, lactic acid 2.4. ESR 50, CRP 11.93  -tylenol suppository for fever  -BCX 6/20 positive  for gram - rods  -zosyn 6/21 start, switch to Unasyn on 6/22.  Blood cultures returned with E. coli sensitive to Zosyn and returned antibiotic course to Zosyn    Copper deficiency  Assessment & Plan  Nutrition input on diet    Vitamin A deficiency  Assessment & Plan  Nutrition input on diet    Heart failure with reduced ejection fraction (HCC)  Assessment & Plan   Echo EF 45%, moderate concentric LVH, global hypokinesis, G1 diastolic dysfunction, mild aortic insufficiency   -look at GDMT, patient currently having issues intaking po meds. controlling nausea    Fe deficiency anemia  Assessment & Plan  Fe panel with Fe 59, TIB and Fe% uncalculable, unsat Fe <17 likely due to ESRD and malnutrition  -epoetin with dialysis      Subclinical hypothyroidism  Assessment & Plan  T4 0.9 at lower end of range, TSH 9.130. Likely related to malnutrition rather than indicative of hypothyroidism.  Holding off on synthroid as given patient's malnutrition will not want to increase metabolism.     Disorder of electrolytes  Assessment & Plan  -monitor and replete as needed K>4, PO4>3, Mg>2    Metabolic alkalosis  Assessment & Plan  Resolved  Potential causes of nausea, patient does not have hyperkalemia or diarrhea etiology potentially associated with ESRD      Hyponatremia- (present on admission)  Assessment & Plan  Improving to 132  Initially sodium 128 moderate hyponatremia, historically has mild to moderate with  lowest 120.  Etiology likely considerable due to malnutrition and ESRD  -Continues to receive D10  -1L fluid restriction per nephrology  -Continue to monitor    Chronic respiratory failure with hypoxia (HCC)- (present on admission)  Assessment & Plan  Home oxygen requirement 5LNC, no increased requirement on admission. Hx of pulmonary HTN  -monitor oxygen requirement    Pancytopenia (HCC)- (present on admission)  Assessment & Plan  Chronic, potential relation to SLE and medications  -continue to monitor   -coagulation labs  sig for elevated INR 1.47    HTN (hypertension)- (present on admission)  Assessment & Plan  -cont home clonidine patch  -hydralazine prn  - discontinue amlodipine in setting of constipation         VTE prophylaxis: SCDs/TEDs and pharmacologic prophylaxis contraindicated due to bleeding risk    I have performed a physical exam and reviewed and updated ROS and Plan today (6/23/2023). In review of yesterday's note (6/22/2023), there are no changes except as documented above.

## 2023-06-24 NOTE — PROGRESS NOTES
Telemetry Report:    Rhythm: Sinus Rhythm  Heart Rate: 95  Ectopy:    NE: 0.14 sec  QRS: 0.07 sec  QT: 0.31 sec            Per telemetry room monitor

## 2023-06-24 NOTE — CARE PLAN
The patient is Stable - Low risk of patient condition declining or worsening    Shift Goals  Clinical Goals: pain management, HD, monitor hemodynamic status, monitor abnormal labs  Patient Goals: pain control, rest  Family Goals: FAHAD    Progress made toward(s) clinical / shift goals:      Patient is not progressing towards the following goals:      Problem: Psychosocial  Goal: Patient's level of anxiety will decrease  Outcome: Progressing     PRN Ativan in use for relief of anxiety/nausea. Educated on non-pharmacological methods to ease anxiety, patient verbalized understanding.     Problem: Pain - Standard  Goal: Alleviation of pain or a reduction in pain to the patient’s comfort goal  Outcome: Progressing     PRN pain medications in use for pain control.

## 2023-06-24 NOTE — THERAPY
Occupational Therapy Contact Note    Patient Name: Lily Nicole  Age:  27 y.o., Sex:  female  Medical Record #: [id number]  Today's Date: 6/24/2023 06/24/23 1622   Interdisciplinary Plan of Care Collaboration   IDT Collaboration with  Nursing;Physical Therapist   Collaboration Comments Pt initially agreeable to participate, rolled self on to side and began to cry asking to go home and being in intense pain per mother at bedside, attempted to determine site of pain pt unable to provide. Spoke with mother about possible equipment needed for home, states she has all equipment needed. Will discharge OT order at this time as patient continues to refuse and wants to discharge home.

## 2023-06-24 NOTE — PROGRESS NOTES
[facility] Internal Medicine Daily Progress Note    Date of Service  6/24/2023    [facility] Team: R IM Orange Team   Attending: Joel Harrison M.d.  Senior Resident: Dr. Kt Deras  Intern:  Dr. Raul Anthony  Contact Number: [phone]    Chief Complaint  Lily Nicole is a 27 y.o. female admitted 6/14/2023 with Acute blood loss from right arm AV fistula    Hospital Course  Lily Nicole is a 27 y.o. female with significant medical history of SLE glomerulonephritis syndrome with  ESRD on hemodialysis (on plaquenil, mycophenolate, prednisone), malnutrition, seizure associated with hypoglycemia, HTN, constipation with chronic opioid use, migraines, pulmonary hypertension on 5LNC who presented 6/14/2023 with acute blood loss anemia from her AV fistula (placed 11-12 yrs ago) while at the dialysis center with hemoglobin 6.1. Patient transfused 1 uPRBC irradiated with dialysis and benadryl 50mg IV due to history of shortness of breath associated with transfusions. Hemoglobin stabilized at 9.1.   Her severe malnutrition status has been discussed. GI and General Surgery have been called regarding placing a G tube or PEG, at this time recs made for further studies and possible NG tube placement given her current condition she has a high risk of bleeding and difficulty healing.  Patient could not undergo upper GI series as could not swallow barium.    Patient is also having hypoglycemic episodes associated with low appetite.  Dobbhoff was placed and was removed as patient could not tolerate.  Palliative care recs for referral to outpatient/home pallaitive care team on discharge.      6/19 bleeding from AV fistula, Hgb holding 11.8. Vascular surgery consulted and patient underwent right fistulogram for treatment of central venous stenosis with venoplasty. Follow up with Dr. Dotson outpatient in 3-6 months to evaluate for central venous stenosis recurrence.      Patient continues to have difficulty consuming  oral intake due to nausea.  Patient is not want placement of NG tube.       Patient was spiking fevers and found to have bacteremia with E. coli.  Continue patient on with Zosyn.  CT thorax abdomen pelvis without evidence of abscess or abdominal infection.  Chest does show bilateral pulmonary infiltration concerning for aspiration pneumonitis.  Discussion with patient's mother regarding TPN and risk of infection.  As patient continues to refuse NG tube placement, TPN may need to be started pending negative blood cultures for placement of a line, however pt has poor access and need for preservation of blood vessel for possible future need for new dialysis access.    Received 1u pRBC 06/23/23 for Hgb 6.4 with subsequent HD.    Interval Problem Update  No acute events overnight. States that they're eating more or trying to; family is also encouraging. Has BM yesterday; normal per mother. Seen by psychiatry with recommendation of speech cog eval and formal psych medical decision making capacity eval; orders placed.     Per Dr. Fortune today, patient is incapacitated to make medical or placement decisions.    I have discussed this patient's plan of care and discharge plan at IDT rounds today with Case Management, Nursing, Nursing leadership, and other members of the IDT team.    Consultants/Specialty  general surgery, GI, nephrology, psychiatry, and psychology and palliative care    Code Status  DNAR, I OK    Disposition  The patient is not medically cleared for discharge to home or a post-acute facility.    I have placed the appropriate orders for post-discharge needs.    Review of Systems  Review of Systems   Constitutional:  Positive for malaise/fatigue and weight loss.   Respiratory:  Negative for cough and shortness of breath.    Cardiovascular:  Negative for chest pain.   Gastrointestinal:  Positive for nausea. Negative for vomiting.   Genitourinary:         Anuric   Musculoskeletal:  Positive for back pain.  Negative for joint pain, myalgias and neck pain.   Neurological:  Positive for weakness. Negative for headaches.      Physical Exam  Temp:  [36 °C (96.8 °F)-37.1 °C (98.8 °F)] 37.1 °C (98.7 °F)  Pulse:  [] 97  Resp:  [16-24] 20  BP: (106-144)/(70-85) 113/85  SpO2:  [92 %-99 %] 97 %    Physical Exam  Constitutional:       General: She is not in acute distress.     Appearance: She is ill-appearing. She is not toxic-appearing or diaphoretic.      Comments: Cachectic with temporal wasting, fatigued.     HENT:      Head: Normocephalic and atraumatic.      Right Ear: External ear normal.      Left Ear: External ear normal.      Nose: Nose normal.      Mouth/Throat:      Mouth: Mucous membranes are dry.   Eyes:      General: No scleral icterus.        Right eye: No discharge.         Left eye: No discharge.      Extraocular Movements: Extraocular movements intact.      Conjunctiva/sclera: Conjunctivae normal.   Cardiovascular:      Rate and Rhythm: Normal rate and regular rhythm.      Pulses: Normal pulses.      Heart sounds: Normal heart sounds. No murmur heard.  Pulmonary:      Effort: Pulmonary effort is normal. No respiratory distress.      Breath sounds: Normal breath sounds.   Abdominal:      General: Abdomen is flat. There is no distension.      Palpations: Abdomen is soft.      Tenderness: There is no abdominal tenderness.   Musculoskeletal:         General: Normal range of motion.      Cervical back: Normal range of motion.      Right lower leg: No edema.      Left lower leg: No edema.      Comments: Right arm with a/v fistula without bleeding, bandaging c/d/i   Skin:     General: Skin is warm and dry.      Comments: Skin indicative of scleroderma of hands and face   Neurological:      General: No focal deficit present.      Mental Status: She is alert and oriented to person, place, and time.      Motor: Weakness present.   Psychiatric:         Mood and Affect: Mood normal.         Behavior: Behavior normal.          Fluids    Intake/Output Summary (Last 24 hours) at 6/24/2023 1348  Last data filed at 6/24/2023 0800  Gross per 24 hour   Intake 160 ml   Output --   Net 160 ml       Laboratory  Recent Labs     06/22/23  1023 06/22/23  1417 06/23/23  0402 06/24/23  0702   WBC 4.3*  --  5.5 4.4*   RBC 2.78*  --  3.47* 3.12*   HEMOGLOBIN 6.4* 6.7* 8.6* 7.6*   HEMATOCRIT 21.9* 22.2* 27.9* 25.1*   MCV 78.8*  --  80.4* 80.4*   MCH 23.0*  --  24.8* 24.4*   MCHC 29.2*  --  30.8* 30.3*   RDW 71.2*  --  67.1* 67.8*   PLATELETCT 104*  --  130* 165     Recent Labs     06/22/23  1023 06/23/23  0402 06/24/23  0702   SODIUM 134* 132* 130*   POTASSIUM 4.4 4.3 4.2   CHLORIDE 100 97 96   CO2 26 25 25   GLUCOSE 85 71 82   BUN 24* 31* 21   CREATININE 2.65* 3.43* 2.81*   CALCIUM 7.9* 8.2* 8.3*                   Imaging  CT-CHEST,ABDOMEN,PELVIS WITH   Final Result      1.  No definite CT evidence for mesenteric ischemia or necrosis.   2.  Findings consistent with bilateral multifocal pneumonitis.   3.  Cardiomegaly.   4.  Enlargement of the main pulmonary artery which is suggestive of pulmonary arterial hypertension.   5.  Hepatomegaly.      6.  Splenomegaly.   7.  Status post cholecystectomy.   8.  Enlarged mediastinal, axillary and retroperitoneal lymph nodes of uncertain etiology and significance. Findings appear similar to the previous exam.   9.  Distal right subclavian vein metallic stent.   10.  Diffuse anasarca.      IR-US GUIDED PIV   Final Result    Ultrasound-guided PERIPHERAL IV INSERTION performed by    qualified nursing staff as above.      [id number] VIEW   Final Result         Feeding tube with tip projecting over the expected area of the first portion duodenum.      EC-ECHOCARDIOGRAM COMPLETE W/O CONT   Final Result      DX-ABDOMEN FOR TUBE PLACEMENT   Final Result      Feeding tube tip at the distal stomach.      These findings were discussed with MARLENE BRENNER on 6/18/2023 5:01 PM.         DX-ABDOMEN FOR TUBE PLACEMENT    Final Result         Feeding tube with tip projecting over the expected area of the first portion duodenum.      IR-EXTREMITY ANGIOGRAM-UNILATERAL RIGHT    (Results Pending)          Assessment/Plan  Problem Representation:    * Anemia associated with acute blood loss- (present on admission)  Assessment & Plan  Presents to the ED with hemoglobin 6.1, blood loss from AV fistula site at dialysis center.  Per patient she had blood loss at dialysis last Friday for which she came to the ED and required a stitch to be placed.  Also had blood loss from AV fistula site at dialysis on Monday.  History of having transfusion reactions requiring Benadryl and getting a blood transfusion with dialysis.  Anemia likely due to acute blood loss from AV fistula, coagulopathy labs show elevated INR 1.34 with TEG findings largely normal with no indication for FFP or cryo.  -Nephrology consulted, appreciate recs: plan to continue dialysis routine MWF  -Ativan prn for anxiety for dialysis (d/c benadryl IV given patient getting ativan IV)  -Epistaxis 6/16 with Hgb of 6.4, transfused 1upRBC with dialysis  -AV fistula bleed 6/19 - see AV fistula  -6/22 no active bleed but hemoglobin 6.4 with repeat 6.7 likely in relation to ESRD and AV fistula bleed from day before. 1 unit transfusion 6/22  -Hgb 8.6 today, qam H&H     Bacteremia of undetermined etiology  Assessment & Plan  6/20 BCXx2 gram negative rods, unknown etiology for infection. No skin ulcers, does have bleeding/open wound during fistula bleeds. Given gram (-) rods, concern for abdominal source but CT abd/pelvis without evidence of abscess or inflammation. Also no abd pain on examination.  -Zosyn started 6/21 with decrease in fever  -Collected 48hrs bcx  - continue on with Zosyn given blood cultures positive for E. coli sensitive to Zosyn and imaging finding concerning for aspiration pna  -BCx 06/22/23 NGTD    Fever  Assessment & Plan  Resolved, no fevers  Fever night and am of  06/19-06/20  Leukocytosis, tachycardia without an identifiable infectious source. No sacral ulcers, no warm/swollen/painful joints. Procal 42.2, lactic acid 2.4. ESR 50, CRP 11.93  -tylenol suppository for fever  -BCX 6/20 positive for gram - rods  -zosyn 6/21 start, switch to Unasyn on 6/22.  Blood cultures returned with E. coli sensitive to Zosyn and returned antibiotic course to Zosyn  -BCx 06/22/23 NGTD    Copper deficiency  Assessment & Plan  Nutrition input on diet    Vitamin A deficiency  Assessment & Plan  Nutrition input on diet    Heart failure with reduced ejection fraction (HCC)  Assessment & Plan   Echo EF 45%, moderate concentric LVH, global hypokinesis, G1 diastolic dysfunction, mild aortic insufficiency   -look at GDMT, patient currently having issues intaking po meds. controlling nausea    Fe deficiency anemia  Assessment & Plan  Fe panel with Fe 59, TIB and Fe% uncalculable, unsat Fe <17 likely due to ESRD and malnutrition  -epoetin with dialysis      Subclinical hypothyroidism  Assessment & Plan  T4 0.9 at lower end of range, TSH 9.130. Likely related to malnutrition rather than indicative of hypothyroidism.  Holding off on synthroid as given patient's malnutrition will not want to increase metabolism.     Disorder of electrolytes  Assessment & Plan  -monitor and replete as needed K>4, PO4>3, Mg>2    Metabolic alkalosis  Assessment & Plan  Resolved  Potential causes of nausea, patient does not have hyperkalemia or diarrhea etiology potentially associated with ESRD      Systemic lupus erythematosus with organ system involvement (HCC)- (present on admission)  Assessment & Plan  - Continue home Plaquenil, mycophenolate, prednisone    Hyponatremia- (present on admission)  Assessment & Plan  Improving to 132  Initially sodium 128 moderate hyponatremia, historically has mild to moderate with  lowest 120.  Etiology likely considerable due to malnutrition and ESRD  -Continues to receive D10  -1L fluid  restriction per nephrology  -Continue to monitor    Severe protein-calorie malnutrition (HCC)- (present on admission)  Assessment & Plan  -dobhoff placed 6/19, couldn't tolerate overnight and removed  Patient does not want NG tube. Poor access and need to preserve vasculature for possible need for future access, so no TPN at this time (and was on hold due to bacteremia)  -Encourage pt and mom for pt's increased oral intake, pt taking ensure and will try chicken noodle soup today  -Nutrition consult  -Vitamins, copper low, multivitamin po  -Low Zn replete with 50mg daily  -prealbumin 5  -Boost with meals  -Calorie count  -Gi consulted, on marinol.   -Psychiatry consulted recs made for scheduled ativan and benadryl, remeron switch to lexapro 20 mg, zyprexa continue at current dose.  -Psychology consult placed  -palliative care consult placed   - Echo EF 45%, moderate concentric LVH, global hypokinesis, G1 diastolic dysfunction, mild aortic insufficiency   -ACTH slight elevation, cortisol within range: doesn't support adrenal insufficiency as cause to n/v  -Continue with D10 50 cc/h  -Pending speech cognitive evaluation  -Pending formal medical decision making capacity evaluation per psych; Per Dr. Fortune today, patient is incapacitated to make medical or placement decisions.    Chronic respiratory failure with hypoxia (HCC)- (present on admission)  Assessment & Plan  Home oxygen requirement 5LNC, no increased requirement on admission. Hx of pulmonary HTN  -monitor oxygen requirement    Pancytopenia (HCC)- (present on admission)  Assessment & Plan  Chronic, potential relation to SLE and medications  -continue to monitor   -coagulation labs sig for elevated INR 1.47    HTN (hypertension)- (present on admission)  Assessment & Plan  -cont home clonidine patch  -hydralazine prn  - discontinue amlodipine in setting of constipation    Arteriovenous fistula, acquired (HCC)- (present on admission)  Assessment & Plan  No  current bleeding  Coagulation studies show slight elevation INR 1.47, TEG no indication for FFT/cryo.  -Bleeding recurred 6/19 Vascular surgery Dr. Emerson took patient for right fistulogram for treatment of central venous stenosis with venoplasty.  -Outpatient followup with vascular surgeon  3-6 months  -fistula cleared for dialysis         VTE prophylaxis: SCDs/TEDs and pharmacologic prophylaxis contraindicated due to bleeding risk    I have performed a physical exam and reviewed and updated ROS and Plan today (6/24/2023). In review of yesterday's note (6/23/2023), there are no changes except as documented above.

## 2023-06-24 NOTE — PROCEDURES
Nephrology/Hemodialysis note    Patient with ESRD/HD, SLE, admitted with bleeding AVF, failure to thrive  S/p AVF angioplasty -functioning well  Seen and examined during dialysis -tolerates well  VS stable  UF 2 L  Lab results reviewed  Anemia low Hb level better post blood  transfusion  Please see dialysis flow sheet for details

## 2023-06-24 NOTE — PROGRESS NOTES
Contacted phlebotomist in regards to 0300 labs due to be drawn. Phlebotomist Sahara Baxter stated that she is a hard stick and there have been attempts made to draw blood, therefore she will have to get a colleague to attempt drawing blood from her instead.    0617  Followed up with phlebotomist and was informed that her colleague would be coming up shortly.    0630 New phlebotomist at bedside, another attempt is being made at drawing labs.

## 2023-06-24 NOTE — CONSULTS
"  [facility] PSYCHIATRIC FOLLOW-UP:(established)  *Reason for admission: evaluation of bleeding from her fistula site.  Patient received dialysis as normally scheduled today but after her dialysis fistula site continued to bleed.New consult requested for capacity to make medical decisions.     Legal Status  vol           S:  pt is not taken medications consistently. She does not take her psychiatric medications at all for the most part. She is not eating or drinking sufficiently either and is compromising her ability to live which she has stated is what she wants to do.     Explained why I was seeing her today and how important it is for her to tell me what she wants and why it is important. Explained she does not have to agree with the doctors but does need to explain what is happening and can happen based on her decisions. She does not. Tried several times in different ways but she won't/can't.     I have been following this pt. I have asked before why she is not taking her lexapro and reiterated what it is for. She \"didn't know\". She has said that she wants to live. However her behavior is in direct conflict with that. Everything she is doing is taking away from the possibility of life.    O: Medical ROS (as pertinent):                      *Psychiatric Examination:   Vitals:   Vitals:    06/24/23 0000 06/24/23 0500 06/24/23 0800 06/24/23 1139   BP: 107/70 108/72 106/71 113/85   Pulse: 91 91 79 97   Resp: (!) 24 (!) 24 (!) 22 20   Temp: 36.1 °C (97 °F) 37.1 °C (98.8 °F) 36.3 °C (97.4 °F) 37.1 °C (98.7 °F)   TempSrc: Temporal Temporal Temporal Temporal   SpO2: 92% 96% 92% 97%   Weight:       Height:         General Appearance:  poor eye contact. unable/can't cooperate. Very ill and cachectic.  Abnormal Movements: little to no spontaneous movement  Gait and Posture: not observed  Speech: minimal and very soft  Thought Process: slow rate  Associations:   linear  Abnormal or Psychotic Thoughts: " none  Judgement and Insight: not improved to the degree needed to participate in her care: impaired.  Orientation: grossly intact  Recent and Remote Memory: grossly intact  Attention Span and Concentration: intact  Language:fluent  Fund of Knowledge: not tested  Mood and Affect: depressed and anxious,   SI/HI:  suicidal - no .per her words but not per her behavior.      Current Medications:  Scheduled Medications   Medication Dose Frequency    omeprazole  20 mg DAILY    thiamine  100 mg DAILY    piperacillin-tazobactam  4.5 g Q12HRS    bisacodyl  10 mg DAILY    epoetin  10,000 Units MO, WE + FR    OLANZapine  2.5 mg Q EVENING    dronabinol  2.5 mg BEFORE LUNCH AND DINNER    folic acid  1 mg DAILY    hydroxychloroquine  200 mg QAM    senna-docusate  2 Tablet BID    therapeutic multivitamin-minerals  1 Tablet DAILY    zinc sulfate  220 mg DAILY    escitalopram  10 mg DAILY    cloNIDine  1 Patch Q7 DAYS    dexamethasone  0.76 mg DAILY    mycophenolate  500 mg QAM    LORazepam  0.5 mg TID          *ASSESSMENT/RECOMENDATIONS:  1.Anxiety disorder [facility]      -R/O adjustment disorder with anxiety      -R/O social anxiety  2 Depressive disorder [facility]     Medical:   -HTN  -hx of nida antwan tear  -severe protein calorie malnutrition  -LUPUS and on chronic steroid regimen: can effect her mood: plaquenil, mycophenolate  -chronic respiratory failure  -hypothyroidism  -anemia with transfusion: it will effect her mood  -ESRD and HD  -pancytopenia  -Heart failure with reduced ejection fracture: not in acute exacerbation     Discussed/voalted: JOSE Harrison MD    Medication and Other Recommendations: final orders as per Tx Tm  She is NOT CAPACITATED to make tx decisions.     As she is not taking psychiatric meds and since unfortunately we do not have anything else to offer at this time, unless she were able to be tube fed in which case meds can be given via this mechanism, we will sign off,for now. Please reconsult as needed.        Discharge recommendations: per tx tm    If released from [facility]: Discharge Instructions:  -Reviewed safety plan: 911, ER, PCM, MHC, Suicide crisis line  -Please assist with outpatient Psychiatric/substance use follow up appointments at discharge once medically cleared.

## 2023-06-24 NOTE — PROGRESS NOTES
Received report and assumed care of patient. Patient alert and oriented x 4, on 6L O2 via NC. Assessment completed. Patient updated regarding plan of care, all questions answered. Bed in lock and lowest position with HOLA bed exit frame alarm on. Call light and belongings are within reach. Educated on room and call light, patient verbalized understanding. Patient expressed no further needs at this time.

## 2023-06-24 NOTE — PROGRESS NOTES
[facility] Progress Note     Hemodialysis treatment ordered today per Dr. Melinda Trent x 3  hours.   Pt awake; on 4 LPM oxygen ; vital signs stable. Patient requesting small needle for HD; okay per Dr. Trent ; BFR at 250    Treatment initiated at 1410 completed 1710.      Patient tolerated tx; see electronic flow sheet for details.      Net UF 2000  mL.      Needles removed from access site. Dressings applied and sites held x 10 minutes; verified no bleeding. Positive bruit/thrill post tx.   Staff RN to monitor AVF/G for breakthrough bleeding. Should breakthrough bleeding occur, staff RN to apply pressure to access sites until bleeding resolved.   Notify Dialysis and Nephrologist for follow-up.     Report given to Primary RN Kush Gruber.

## 2023-06-24 NOTE — PROGRESS NOTES
"Nephrology Daily Progress Note    Date of Service  6/24/2023    Chief Complaint  27 y.o. female with a history of uncontrolled lupus, hypertension, ESRD on hemodialysis Monday Wednesday Friday who presented 6/14/2023 with prolonged bleeding from AV fistula and failure to thrive.    Interval Problem Update  6/15-patient had additional ultrafiltration treatment yesterday with 1 L removed.  Tolerated blood transfusion yesterday.  Complains of joint pain all over, and poor appetite.  She remains adamant she would never want \"tubes sticking out of me\" for enteral nutrition support.  6/18 -patient had ultrafiltration treatment yesterday with 0.5 L removed while receiving blood transfusion.  Denies chest pain, complains of some fatigue and shortness of breath.  She has lots of family members visiting her today.  6/19 -pulled NGT due to discomfort  Awaiting AVF revision due to episodes of prolonged bleeding post dialysis  Will postpone HD for tomorrow  6/22 -s/p AVF revision/angioplasty  -functioning well  No improvement in po food intake  Very weak, not ambulating  Mother at bed side  Anemia: low Hb level -scheduled blood transfusion  HD scheduled for tomorrow  6/24 -no improvement in po intake  Very weak, fatigue  HD MWF  Review of Systems  Review of Systems   Unable to perform ROS: Other        Physical Exam  Temp:  [36 °C (96.8 °F)-37.1 °C (98.8 °F)] 36.3 °C (97.4 °F)  Pulse:  [] 79  Resp:  [16-24] 22  BP: (106-144)/(70-80) 106/71  SpO2:  [92 %-99 %] 92 %    Physical Exam  Vitals and nursing note reviewed.   Constitutional:       General: She is sleeping. She is not in acute distress.     Appearance: She is well-developed. She is cachectic. She is ill-appearing.   HENT:      Head: Normocephalic and atraumatic.      Nose: Nose normal.   Neck:      Thyroid: No thyromegaly.   Cardiovascular:      Rate and Rhythm: Normal rate and regular rhythm.      Pulses: Normal pulses.      Heart sounds: Normal heart sounds.      " No friction rub. No gallop.   Pulmonary:      Effort: Pulmonary effort is normal. No respiratory distress.      Breath sounds: Normal breath sounds. No wheezing, rhonchi or rales.   Abdominal:      General: Bowel sounds are normal. There is no distension.      Palpations: Abdomen is soft. There is no mass.      Tenderness: There is no abdominal tenderness. There is no guarding.   Musculoskeletal:      Right lower leg: No edema.      Left lower leg: No edema.   Skin:     General: Skin is warm and dry.      Coloration: Skin is pale.      Findings: No erythema or rash.   Neurological:      Comments: sleeping     Dialysis access: Right upper arm AV fistula, patent bruit and thrill    Fluids    Intake/Output Summary (Last 24 hours) at 6/24/2023 1133  Last data filed at 6/24/2023 0800  Gross per 24 hour   Intake 160 ml   Output --   Net 160 ml         Laboratory  Labs reviewed, pertinent labs below.  Recent Labs     06/22/23  1023 06/22/23  1417 06/23/23  0402 06/24/23  0702   WBC 4.3*  --  5.5 4.4*   RBC 2.78*  --  3.47* 3.12*   HEMOGLOBIN 6.4* 6.7* 8.6* 7.6*   HEMATOCRIT 21.9* 22.2* 27.9* 25.1*   MCV 78.8*  --  80.4* 80.4*   MCH 23.0*  --  24.8* 24.4*   MCHC 29.2*  --  30.8* 30.3*   RDW 71.2*  --  67.1* 67.8*   PLATELETCT 104*  --  130* 165       Recent Labs     06/22/23  1023 06/23/23  0402 06/24/23  0702   SODIUM 134* 132* 130*   POTASSIUM 4.4 4.3 4.2   CHLORIDE 100 97 96   CO2 26 25 25   GLUCOSE 85 71 82   BUN 24* 31* 21   CREATININE 2.65* 3.43* 2.81*   CALCIUM 7.9* 8.2* 8.3*                       Imaging interpreted by radiologist. Imaging reports reviewed with pertinent findings below  CT-CHEST,ABDOMEN,PELVIS WITH   Final Result      1.  No definite CT evidence for mesenteric ischemia or necrosis.   2.  Findings consistent with bilateral multifocal pneumonitis.   3.  Cardiomegaly.   4.  Enlargement of the main pulmonary artery which is suggestive of pulmonary arterial hypertension.   5.  Hepatomegaly.      6.   Splenomegaly.   7.  Status post cholecystectomy.   8.  Enlarged mediastinal, axillary and retroperitoneal lymph nodes of uncertain etiology and significance. Findings appear similar to the previous exam.   9.  Distal right subclavian vein metallic stent.   10.  Diffuse anasarca.      IR-US GUIDED PIV   Final Result    Ultrasound-guided PERIPHERAL IV INSERTION performed by    qualified nursing staff as above.      [id number] VIEW   Final Result         Feeding tube with tip projecting over the expected area of the first portion duodenum.      EC-ECHOCARDIOGRAM COMPLETE W/O CONT   Final Result      DX-ABDOMEN FOR TUBE PLACEMENT   Final Result      Feeding tube tip at the distal stomach.      These findings were discussed with MARLENE BRENNER on 6/18/2023 5:01 PM.         DX-ABDOMEN FOR TUBE PLACEMENT   Final Result         Feeding tube with tip projecting over the expected area of the first portion duodenum.      IR-EXTREMITY ANGIOGRAM-UNILATERAL RIGHT    (Results Pending)         Current Facility-Administered Medications   Medication Dose Route Frequency Provider Last Rate Last Admin    omeprazole (PRILOSEC) capsule 20 mg  20 mg Oral DAILY Joel Harrison M.D.        thiamine (Vitamin B-1) tablet 100 mg  100 mg Oral DAILY Joel Harrison M.D.        dextrose 10% infusion   Intravenous Continuous Raul Anthony M.D. 50 mL/hr at 06/24/23 0314 New Bag at 06/24/23 0314    piperacillin-tazobactam (Zosyn) 4.5 g in  mL IVPB  4.5 g Intravenous Q12HRS Raul Anthony M.D.   Stopped at 06/24/23 0257    bisacodyl (DULCOLAX) suppository 10 mg  10 mg Rectal DAILY Raul Anthony M.D.   10 mg at 06/21/23 1720    epoetin roque (EPOGEN/PROCRIT) injection 10,000 Units  10,000 Units Intravenous MO, WE +  Melinda Trent M.D.   10,000 Units at 06/23/23 1702    acetaminophen (Tylenol) tablet 650 mg  650 mg Oral Q4HRS PRN Joel Harrison M.D.        acetaminophen (TYLENOL) suppository 650 mg  650 mg Rectal Q4HRS PRN  Joel Harrison M.D.   650 mg at 06/20/23 0914    OLANZapine (ZYPREXA) tablet 2.5 mg  2.5 mg Oral Q EVENING Raul Anthony M.D.        dronabinol (MARINOL) capsule 2.5 mg  2.5 mg Oral BEFORE LUNCH AND DINNER Raul Anthony M.D.   2.5 mg at 06/23/23 1046    folic acid (FOLVITE) tablet 1 mg  1 mg Oral DAILY Raul Anthony M.D.        hydroxychloroquine (Plaquenil) tablet 200 mg  200 mg Oral QAM Raul Anthony M.D.        senna-docusate (PERICOLACE or SENOKOT S) 8.6-50 MG per tablet 2 Tablet  2 Tablet Oral BID Raul Anthony M.D.   2 Tablet at 06/22/23 1708    And    polyethylene glycol/lytes (MIRALAX) PACKET 1 Packet  1 Packet Oral QDAY PRN Raul Anthony M.D.        And    magnesium hydroxide (MILK OF MAGNESIA) suspension 30 mL  30 mL Oral QDAY PRN Raul Anthony M.D.        And    bisacodyl (DULCOLAX) suppository 10 mg  10 mg Rectal QDAY PRN Raul Anthony M.D.        ondansetron (ZOFRAN ODT) dispertab 4 mg  4 mg Oral Q4HRS PRN Raul Anthony M.D.        therapeutic multivitamin-minerals (THERAGRAN-M) tablet 1 Tablet  1 Tablet Oral DAILY Raul Anthony M.D.        zinc sulfate (ZINCATE) capsule 220 mg  220 mg Oral DAILY Raul Anthony M.D.        escitalopram (Lexapro) tablet 10 mg  10 mg Oral DAILY Raul Anthony M.D.        cloNIDine (CATAPRES) 0.2 MG/24HR patch 1 Patch  1 Patch Transdermal Q7 DAYS Raul Anthony M.D.   1 Patch at 06/19/23 1538    LORazepam (ATIVAN) injection 0.5 mg  0.5 mg Intravenous Q4HRS PRN Raul Anthony M.D.   0.5 mg at 06/24/23 0755    HYDROmorphone (Dilaudid) injection 0.5 mg  0.5 mg Intravenous Q4HRS PRN Raul Anthony M.D.   0.5 mg at 06/24/23 0512    sodium chloride (OCEAN) 0.65 % nasal spray 2 Spray  2 Spray Nasal Q2HRS PRN Raul Anthony M.D.        dexamethasone (DECADRON) injection 0.76 mg  0.76 mg Intravenous DAILY Raul Anthony M.D.   0.76 mg at 06/23/23 0618    mycophenolate (CELLCEPT) 200 MG/ML susp 500 mg   500 mg Oral QAM Selam Stevenson M.D.   500 mg at 06/23/23 1047    hydrALAZINE (APRESOLINE) injection 10 mg  10 mg Intravenous Q6HRS PRN Raul Anthony M.D.   10 mg at 06/19/23 1640    LORazepam (ATIVAN) injection 0.5 mg  0.5 mg Intravenous TID Raul Anthony M.D.   0.5 mg at 06/23/23 0944    dextrose 10 % BOLUS 25 g  25 g Intravenous Q15 MIN PRN Teddy Golden M.D.   Held at 06/23/23 0722    NS (BOLUS) infusion 250 mL  250 mL Intravenous DIALYSIS PRN Navin Metz M.D.        lidocaine (XYLOCAINE) 1 % injection 1 mL  1 mL Intradermal PRN Navin Metz M.D.   1 mL at 06/23/23 1405    ondansetron (ZOFRAN) syringe/vial injection 4 mg  4 mg Intravenous Q4HRS PRN Raul Anthony M.D.   4 mg at 06/24/23 0755         Assessment/Plan  27 y.o. female with a history of uncontrolled lupus, hypertension, ESRD on hemodialysis Monday Wednesday Friday who presented 6/14/2023 with prolonged bleeding from AV fistula and failure to thrive.     1.ESRD/HD on MWF schedule     No need for emergent dialysis today     2.  Dialysis access: RUE AVF -s/p revision/angioplasty -functioning well     3. Anemia due to ESRD -low Hb level -on Epogen with HD      S/p blood transfusion     4.  Failure to thrive , severe protein malnutrition        - improvement in po intake       -continue with protein boosts, no renal diet restrictions     5.  Electrolytes; well controlled except hyponatremia -avoid free water, increase protein intake    6.  HTN: BP well controlled -to monitor    Recs:  No need for emergent dialysis today  Continue HD on MWF schedule  Monitor CBC, BMP, albumin  Limit free water  Prognosis grim  Patient critically ill  Will follow

## 2023-06-25 PROBLEM — Z71.89 ACP (ADVANCE CARE PLANNING): Status: ACTIVE | Noted: 2023-01-01

## 2023-06-25 NOTE — DISCHARGE PLANNING
Received choice form @: [id number]  Agency/Facility name: [facility]  Sent referral per choice form @: [id number]

## 2023-06-25 NOTE — THERAPY
Physical Therapy   Initial Evaluation     Patient Name: Lily Nicole  Age:  27 y.o., Sex:  female  Medical Record #: [id number]  Today's Date: 6/24/2023          Assessment  Pt presents with impaired cognition, engagement, volition and strength associated with chief complaint of acute blood loss from HD fistula in setting of lupus and failure to thrive. Pt initially agreeable to attempt EOB then rolled self to edge and became inconsolable despite mother's best effort in assisting in mobility, reporting pain everywhere; from a PT perspective, pt would best be served in her home environment if she is to participate in any PT interventions; whether competent or not, she has been consistent with her requests to return home and does not appear she will consistently engage in PT intervention in this environment to make any progress. Recommend return home with mother when/if medically appropriate to do so with trial of home health PT per mother's request to assess if mood can improve in home environment and therefore engagement in interventions; if does not occur, given debility and immobile history, pt may be nearing hospice treatment. Will not follow as this is pt's 3rd refusal and unlikely to get any benefit from PT intervention at this time; please reconsult should goals of care/condition change.      Plan    DME: mother reports all equipment is working at home; she would need medical transport home in her current condition per mother;   D/c recommendations: home health PT trial if participation/mood does not improve then transition to hospice as medically appropriate.        Abridged Subjective/Objective     06/24/23 1645   Prior Living Situation   Prior Services Continuous (24 Hour) Care Giving Family   Housing / Facility 1 Story House   Steps Into Home 0   Steps In Home 0   Equipment Owned Wheelchair   Lives with - Patient's Self Care Capacity Parents   Comments mom assists 24/7 and has all equipment; pt has a  sister that occasionally provides support when mom is out of the home but not physical   Prior Level of Functional Mobility   Bed Mobility Required Assist   Transfer Status Required Assist   Ambulation Unable To Determine At This Time   Ambulation Distance nonambulatory x 3 months   Cognition    Cognition / Consciousness X   Speech/ Communication Delayed Responses   Level of Consciousness Responds to voice   Comments initally agreeable then tearful and reverts to Irish with her mother; mother is very supportive; pt prefers mobility with mother but refused her efforts as well;   Strength Lower Body   Lower Body Strength  X   Gross Strength Generalized Weakness, Equal Bilaterally   Bed Mobility    Rolling Standby Assist   Education Group   Additional Comments home health vs hospice

## 2023-06-25 NOTE — PROGRESS NOTES
"Nephrology Daily Progress Note    Date of Service  6/25/2023    Chief Complaint  27 y.o. female with a history of uncontrolled lupus, hypertension, ESRD on hemodialysis Monday Wednesday Friday who presented 6/14/2023 with prolonged bleeding from AV fistula and failure to thrive.    Interval Problem Update  6/15-patient had additional ultrafiltration treatment yesterday with 1 L removed.  Tolerated blood transfusion yesterday.  Complains of joint pain all over, and poor appetite.  She remains adamant she would never want \"tubes sticking out of me\" for enteral nutrition support.  6/18 -patient had ultrafiltration treatment yesterday with 0.5 L removed while receiving blood transfusion.  Denies chest pain, complains of some fatigue and shortness of breath.  She has lots of family members visiting her today.  6/19 -pulled NGT due to discomfort  Awaiting AVF revision due to episodes of prolonged bleeding post dialysis  Will postpone HD for tomorrow  6/22 -s/p AVF revision/angioplasty  -functioning well  No improvement in po food intake  Very weak, not ambulating  Mother at bed side  Anemia: low Hb level -scheduled blood transfusion  HD scheduled for tomorrow  6/24 -no improvement in po intake  Very weak, fatigue  HD MWF  6/25 -no improvement in patient condition,po intake  PT evaluated -not cooperating  HD MWF  Review of Systems  Review of Systems   Unable to perform ROS: Other    sleeping    Physical Exam  Temp:  [36.3 °C (97.3 °F)-36.9 °C (98.5 °F)] 36.3 °C (97.3 °F)  Pulse:  [] 88  Resp:  [16-20] 16  BP: (113-150)/(75-97) 127/75  SpO2:  [92 %-96 %] 94 %    Physical Exam  Vitals and nursing note reviewed.   Constitutional:       General: She is sleeping. She is not in acute distress.     Appearance: She is well-developed. She is cachectic. She is ill-appearing.   HENT:      Head: Normocephalic and atraumatic.      Nose: Nose normal.   Neck:      Thyroid: No thyromegaly.   Cardiovascular:      Rate and Rhythm: " Normal rate and regular rhythm.      Pulses: Normal pulses.      Heart sounds: Normal heart sounds.      No friction rub. No gallop.   Pulmonary:      Effort: Pulmonary effort is normal. No respiratory distress.      Breath sounds: Normal breath sounds. No wheezing, rhonchi or rales.   Abdominal:      General: Bowel sounds are normal. There is no distension.      Palpations: Abdomen is soft. There is no mass.      Tenderness: There is no abdominal tenderness. There is no guarding.   Musculoskeletal:      Right lower leg: No edema.      Left lower leg: No edema.   Skin:     General: Skin is warm and dry.      Coloration: Skin is pale.      Findings: No erythema or rash.   Neurological:      Comments: sleeping     Dialysis access: Right upper arm AV fistula, patent bruit and thrill    Fluids    Intake/Output Summary (Last 24 hours) at 6/25/2023 1154  Last data filed at 6/25/2023 0100  Gross per 24 hour   Intake 280 ml   Output --   Net 280 ml         Laboratory  Labs reviewed, pertinent labs below.  Recent Labs     06/23/23  0402 06/24/23  0702 06/25/23  0211   WBC 5.5 4.4* 5.2   RBC 3.47* 3.12* 3.09*   HEMOGLOBIN 8.6* 7.6* 7.4*   HEMATOCRIT 27.9* 25.1* 25.5*   MCV 80.4* 80.4* 82.5   MCH 24.8* 24.4* 23.9*   MCHC 30.8* 30.3* 29.0*   RDW 67.1* 67.8* 73.5*   PLATELETCT 130* 165 167       Recent Labs     06/23/23  0402 06/24/23  0702 06/25/23  0211   SODIUM 132* 130* 129*   POTASSIUM 4.3 4.2 4.2   CHLORIDE 97 96 94*   CO2 25 25 24   GLUCOSE 71 82 72   BUN 31* 21 27*   CREATININE 3.43* 2.81* 3.41*   CALCIUM 8.2* 8.3* 8.3*                       Imaging interpreted by radiologist. Imaging reports reviewed with pertinent findings below  CT-CHEST,ABDOMEN,PELVIS WITH   Final Result      1.  No definite CT evidence for mesenteric ischemia or necrosis.   2.  Findings consistent with bilateral multifocal pneumonitis.   3.  Cardiomegaly.   4.  Enlargement of the main pulmonary artery which is suggestive of pulmonary arterial  hypertension.   5.  Hepatomegaly.      6.  Splenomegaly.   7.  Status post cholecystectomy.   8.  Enlarged mediastinal, axillary and retroperitoneal lymph nodes of uncertain etiology and significance. Findings appear similar to the previous exam.   9.  Distal right subclavian vein metallic stent.   10.  Diffuse anasarca.      IR-US GUIDED PIV   Final Result    Ultrasound-guided PERIPHERAL IV INSERTION performed by    qualified nursing staff as above.      [id number] VIEW   Final Result         Feeding tube with tip projecting over the expected area of the first portion duodenum.      EC-ECHOCARDIOGRAM COMPLETE W/O CONT   Final Result      DX-ABDOMEN FOR TUBE PLACEMENT   Final Result      Feeding tube tip at the distal stomach.      These findings were discussed with MARLENE BRENNER on 6/18/2023 5:01 PM.         DX-ABDOMEN FOR TUBE PLACEMENT   Final Result         Feeding tube with tip projecting over the expected area of the first portion duodenum.      IR-EXTREMITY ANGIOGRAM-UNILATERAL RIGHT    (Results Pending)         Current Facility-Administered Medications   Medication Dose Route Frequency Provider Last Rate Last Admin    omeprazole (PRILOSEC) capsule 20 mg  20 mg Oral DAILY Joel Harrison M.D.   20 mg at 06/25/23 0415    thiamine (Vitamin B-1) tablet 100 mg  100 mg Oral DAILY Joel Harrison M.D.   100 mg at 06/25/23 0415    dextrose 10% infusion   Intravenous Continuous Raul Anthony M.D. 50 mL/hr at 06/24/23 0314 New Bag at 06/24/23 0314    piperacillin-tazobactam (Zosyn) 4.5 g in  mL IVPB  4.5 g Intravenous Q12HRS Raul Anthony M.D. 25 mL/hr at 06/25/23 1017 4.5 g at 06/25/23 1017    bisacodyl (DULCOLAX) suppository 10 mg  10 mg Rectal DAILY Raul Anthony M.D.   10 mg at 06/21/23 1720    epoetin roque (EPOGEN/PROCRIT) injection 10,000 Units  10,000 Units Intravenous MO, WE + FR Melinda Trent M.D.   10,000 Units at 06/23/23 1702    acetaminophen (Tylenol) tablet 650 mg  650 mg Oral  Q4HRS PRN Joel Harrison M.D.        acetaminophen (TYLENOL) suppository 650 mg  650 mg Rectal Q4HRS PRN Joel Harrison M.D.   650 mg at 06/20/23 0914    OLANZapine (ZYPREXA) tablet 2.5 mg  2.5 mg Oral Q EVENING Raul Anthony M.D.        dronabinol (MARINOL) capsule 2.5 mg  2.5 mg Oral BEFORE LUNCH AND DINNER Raul Anthony M.D.   2.5 mg at 06/23/23 1046    folic acid (FOLVITE) tablet 1 mg  1 mg Oral DAILY Raul Anthony M.D.        hydroxychloroquine (Plaquenil) tablet 200 mg  200 mg Oral QAM Raul Anthony M.D.        senna-docusate (PERICOLACE or SENOKOT S) 8.6-50 MG per tablet 2 Tablet  2 Tablet Oral BID Raul Anthony M.D.   2 Tablet at 06/25/23 0415    And    polyethylene glycol/lytes (MIRALAX) PACKET 1 Packet  1 Packet Oral QDAY PRN Raul Anthony M.D.        And    magnesium hydroxide (MILK OF MAGNESIA) suspension 30 mL  30 mL Oral QDAY PRN Raul Anthony M.D.        And    bisacodyl (DULCOLAX) suppository 10 mg  10 mg Rectal QDAY PRN Raul Anthony M.D.        ondansetron (ZOFRAN ODT) dispertab 4 mg  4 mg Oral Q4HRS PRN Raul Anthony M.D.        therapeutic multivitamin-minerals (THERAGRAN-M) tablet 1 Tablet  1 Tablet Oral DAILY Raul Anthony M.D.        zinc sulfate (ZINCATE) capsule 220 mg  220 mg Oral DAILY Raul Anthony M.D.        escitalopram (Lexapro) tablet 10 mg  10 mg Oral DAILY Raul Anthony M.D.        cloNIDine (CATAPRES) 0.2 MG/24HR patch 1 Patch  1 Patch Transdermal Q7 DAYS Raul Anthony M.D.   1 Patch at 06/19/23 1538    LORazepam (ATIVAN) injection 0.5 mg  0.5 mg Intravenous Q4HRS PRN Raul Anthony M.D.   0.5 mg at 06/25/23 0416    HYDROmorphone (Dilaudid) injection 0.5 mg  0.5 mg Intravenous Q4HRS PRN Raul Anthony M.D.   0.5 mg at 06/25/23 0836    sodium chloride (OCEAN) 0.65 % nasal spray 2 Spray  2 Spray Nasal Q2HRS PRN Raul Anthony M.D.        dexamethasone (DECADRON) injection 0.76 mg  0.76 mg Intravenous  DAILY aRul Anthony M.D.   0.76 mg at 06/25/23 0415    mycophenolate (CELLCEPT) 200 MG/ML susp 500 mg  500 mg Oral QACASIE Stevenson M.D.   500 mg at 06/25/23 1016    hydrALAZINE (APRESOLINE) injection 10 mg  10 mg Intravenous Q6HRS PRN Raul Anthony M.D.   10 mg at 06/19/23 1640    LORazepam (ATIVAN) injection 0.5 mg  0.5 mg Intravenous TID Raul Anthony M.D.   0.5 mg at 06/25/23 1016    dextrose 10 % BOLUS 25 g  25 g Intravenous Q15 MIN PRN Teddy Golden M.D.   Held at 06/23/23 0722    NS (BOLUS) infusion 250 mL  250 mL Intravenous DIALYSIS PRN Navin Metz M.D.        lidocaine (XYLOCAINE) 1 % injection 1 mL  1 mL Intradermal PRN Navin Metz M.D.   1 mL at 06/23/23 1405    ondansetron (ZOFRAN) syringe/vial injection 4 mg  4 mg Intravenous Q4HRS PRN Raul Anthony M.D.   4 mg at 06/25/23 0836         Assessment/Plan  27 y.o. female with a history of uncontrolled lupus, hypertension, ESRD on hemodialysis Monday Wednesday Friday who presented 6/14/2023 with prolonged bleeding from AV fistula and failure to thrive.     1.ESRD/HD on MWF schedule     No need for emergent dialysis today     2.  Dialysis access: RUE AVF -s/p revision/angioplasty        Functioning well     3. Anemia of chronic disease -on Epogen     4.  Failure to thrive , severe protein malnutrition        - improvement in po intake       -continue with protein boosts, no renal diet restrictions     5.  Electrolytes; hyponatremia worse -limit free water, encourage protein intake  6.  HTN: BP very well controlled -to monitor    Recs:  No need for emergent dialysis today  Continue HD on MWF schedule  Monitor CBC, BMP, albumin  Limit free water  Prognosis grim  Patient critically ill  Will follow  D/w mother patient condition asked to in courage po intake

## 2023-06-25 NOTE — CARE PLAN
The patient is Stable - Low risk of patient condition declining or worsening    Shift Goals  Clinical Goals: increased PO intake, out of bed to chair, pain/nausea mgmt  Patient Goals: pain/nausea mgmt  Family Goals: FAHAD    Progress made toward(s) clinical / shift goals:    Problem: Pain - Standard  Goal: Alleviation of pain or a reduction in pain to the patient’s comfort goal  Outcome: Progressing     Pain mgmt continues with PRN 0.5mg IV dilaudid. Pt continues to endorse zofran, plan to administer q4hr IV zofran. Spoke with pt and family for twenty minutes to stress importance of PO medications, specifically Marinol to increase appetite and Zyprexa to improve mood. Mom is encouraging pt to eat/drink/take meds. Pt continues to refuse, endorsing too much nausea, MD aware. Refused to work with PT. Minimal PO intake noted, some increase with snacks mom brings to bedside.      Problem: Knowledge Deficit - Standard  Goal: Patient and family/care givers will demonstrate understanding of plan of care, disease process/condition, diagnostic tests and medications  Outcome: Not Progressing

## 2023-06-25 NOTE — PROGRESS NOTES
Telemetry Report:    Rhythm: Sinus Rhythm  Heart Rate: 86  Ectopy:    AL: 0.16 sec  QRS: 0.08 sec  QT: 0.34 sec                  Per telemetry room monitor

## 2023-06-25 NOTE — PROGRESS NOTES
[facility] Internal Medicine Daily Progress Note    Date of Service  6/25/2023    [facility] Team: R IM Orange Team   Attending: Joel Harrison M.d.  Senior Resident: Dr. Kt Deras  Intern:  Dr. Raul Anthony  Contact Number: [phone]    Chief Complaint  Lily Nicole is a 27 y.o. female admitted 6/14/2023 with Acute blood loss from right arm AV fistula    Hospital Course  Lily Nicole is a 27 y.o. female with significant medical history of SLE glomerulonephritis syndrome with  ESRD on hemodialysis (on plaquenil, mycophenolate, prednisone), malnutrition, seizure associated with hypoglycemia, HTN, constipation with chronic opioid use, migraines, pulmonary hypertension on 5LNC who presented 6/14/2023 with acute blood loss anemia from her AV fistula (placed 11-12 yrs ago) while at the dialysis center with hemoglobin 6.1. Patient transfused 1 uPRBC irradiated with dialysis and benadryl 50mg IV due to history of shortness of breath associated with transfusions. Hemoglobin stabilized at 9.1.   Her severe malnutrition status has been discussed. GI and General Surgery have been called regarding placing a G tube or PEG, at this time recs made for further studies and possible NG tube placement given her current condition she has a high risk of bleeding and difficulty healing.  Patient could not undergo upper GI series as could not swallow barium.    Patient is also having hypoglycemic episodes associated with low appetite.  Dobbhoff was placed and was removed as patient could not tolerate.  Palliative care recs for referral to outpatient/home pallaitive care team on discharge.      6/19 bleeding from AV fistula, Hgb holding 11.8. Vascular surgery consulted and patient underwent right fistulogram for treatment of central venous stenosis with venoplasty. Follow up with Dr. Dotson outpatient in 3-6 months to evaluate for central venous stenosis recurrence.      Patient continues to have difficulty consuming  oral intake due to nausea.  Patient is not want placement of NG tube.       Patient was spiking fevers and found to have bacteremia with E. coli.  Continue patient on with Zosyn.  CT thorax abdomen pelvis without evidence of abscess or abdominal infection.  Chest does show bilateral pulmonary infiltration concerning for aspiration pneumonitis.  Discussion with patient's mother regarding TPN and risk of infection.  As patient continues to refuse NG tube placement, TPN may need to be started pending negative blood cultures for placement of a line, however pt has poor access and need for preservation of blood vessel for possible future need for new dialysis access.    Received 1u pRBC 06/23/23 for Hgb 6.4 with subsequent HD.    Per Dr. Fortune 06/24/23, patient is incapacitated to make medical or placement decisions.    Interval Problem Update  No acute events overnight. Patient states that they are trying to eat more; likely grilled cheese sandwich and yogurt. Family continues to encourage. Pending speech cog eval.    Lengthy discussion with mother Chikis regarding patient's prognosis and goals of care. Medical team has been very carolann with both patient and family regarding poor prognosis. Mother understands patient's poor prognosis. Discussed that patient has refused NG tube and when eventually placed had auto-removed, refusing PO meds, and not having much PO intake. Discussed what patient's wishes have been, and what they were prior to hospitalization. Discussed what hospice is, what it entails, and how this would be implemented at home. Discussed need for discontinuation of hemodialysis in order to participate in hospice care. Discussed no further hospital admissions. Discussed that pain and anxiety medications would continue to provide comfort. Conservative estimate of 1 month given regarding life expectancy, as hemodialysis would be discontinued, IV antibiotics discontinued, and anything that doesn't provide  comfort would likely be discontinued. Patient's mother understands that that is a conservative estimate, and life expectancy may be much shorter. Patient's mother thinks that hospice may be the best for her, but wants to discuss further with family. Patient's sister Jaz was also spoken to over speaker phone regarding patient's prognosis, goals of care, and consideration of home hospice as noted above. Both mother and sister will discuss with family tonight and will make a decision tomorrow. Code status discussed as well, as patient currently DNAR, I Okay. Mother would like to wait until tomorrow regarding code status discussion as well.    I have discussed this patient's plan of care and discharge plan at IDT rounds today with Case Management, Nursing, Nursing leadership, and other members of the IDT team.    Consultants/Specialty  general surgery, GI, nephrology, psychiatry, and psychology and palliative care    Code Status  DNAR, I OK    Disposition  The patient is not medically cleared for discharge to home or a post-acute facility.  Anticipate discharge to: home with close outpatient follow-up  I have placed the appropriate orders for post-discharge needs.    Review of Systems  Review of Systems   Constitutional:  Positive for malaise/fatigue and weight loss.   Respiratory:  Negative for cough and shortness of breath.    Cardiovascular:  Negative for chest pain.   Gastrointestinal:  Positive for nausea. Negative for vomiting.   Genitourinary:         Anuric   Musculoskeletal:  Positive for back pain. Negative for joint pain, myalgias and neck pain.   Neurological:  Positive for weakness. Negative for headaches.      Physical Exam  Temp:  [36.3 °C (97.3 °F)-36.9 °C (98.5 °F)] 36.3 °C (97.3 °F)  Pulse:  [] 88  Resp:  [16-20] 16  BP: (113-150)/(75-97) 127/75  SpO2:  [92 %-96 %] 94 %    Physical Exam  Constitutional:       General: She is not in acute distress.     Appearance: She is ill-appearing. She is  not toxic-appearing or diaphoretic.      Comments: Cachectic with temporal wasting, fatigued.     HENT:      Head: Normocephalic and atraumatic.      Right Ear: External ear normal.      Left Ear: External ear normal.      Nose: Nose normal.      Mouth/Throat:      Mouth: Mucous membranes are dry.   Eyes:      General: No scleral icterus.        Right eye: No discharge.         Left eye: No discharge.      Extraocular Movements: Extraocular movements intact.      Conjunctiva/sclera: Conjunctivae normal.   Cardiovascular:      Rate and Rhythm: Normal rate and regular rhythm.      Pulses: Normal pulses.      Heart sounds: Normal heart sounds. No murmur heard.  Pulmonary:      Effort: Pulmonary effort is normal. No respiratory distress.      Breath sounds: Normal breath sounds.   Abdominal:      General: Abdomen is flat. There is no distension.      Palpations: Abdomen is soft.      Tenderness: There is no abdominal tenderness.   Musculoskeletal:         General: Normal range of motion.      Cervical back: Normal range of motion.      Right lower leg: No edema.      Left lower leg: No edema.      Comments: Right arm with a/v fistula without bleeding, bandaging c/d/i   Skin:     General: Skin is warm and dry.      Comments: Skin indicative of scleroderma of hands and face   Neurological:      General: No focal deficit present.      Mental Status: She is alert and oriented to person, place, and time.      Motor: Weakness present.   Psychiatric:         Mood and Affect: Mood normal.         Behavior: Behavior normal.         Fluids    Intake/Output Summary (Last 24 hours) at 6/25/2023 1233  Last data filed at 6/25/2023 0100  Gross per 24 hour   Intake 280 ml   Output --   Net 280 ml       Laboratory  Recent Labs     06/23/23  0402 06/24/23  0702 06/25/23  0211   WBC 5.5 4.4* 5.2   RBC 3.47* 3.12* 3.09*   HEMOGLOBIN 8.6* 7.6* 7.4*   HEMATOCRIT 27.9* 25.1* 25.5*   MCV 80.4* 80.4* 82.5   MCH 24.8* 24.4* 23.9*   MCHC 30.8*  30.3* 29.0*   RDW 67.1* 67.8* 73.5*   PLATELETCT 130* 165 167     Recent Labs     06/23/23  0402 06/24/23  0702 06/25/23  0211   SODIUM 132* 130* 129*   POTASSIUM 4.3 4.2 4.2   CHLORIDE 97 96 94*   CO2 25 25 24   GLUCOSE 71 82 72   BUN 31* 21 27*   CREATININE 3.43* 2.81* 3.41*   CALCIUM 8.2* 8.3* 8.3*                   Imaging  CT-CHEST,ABDOMEN,PELVIS WITH   Final Result      1.  No definite CT evidence for mesenteric ischemia or necrosis.   2.  Findings consistent with bilateral multifocal pneumonitis.   3.  Cardiomegaly.   4.  Enlargement of the main pulmonary artery which is suggestive of pulmonary arterial hypertension.   5.  Hepatomegaly.      6.  Splenomegaly.   7.  Status post cholecystectomy.   8.  Enlarged mediastinal, axillary and retroperitoneal lymph nodes of uncertain etiology and significance. Findings appear similar to the previous exam.   9.  Distal right subclavian vein metallic stent.   10.  Diffuse anasarca.      IR-US GUIDED PIV   Final Result    Ultrasound-guided PERIPHERAL IV INSERTION performed by    qualified nursing staff as above.      [id number] VIEW   Final Result         Feeding tube with tip projecting over the expected area of the first portion duodenum.      EC-ECHOCARDIOGRAM COMPLETE W/O CONT   Final Result      DX-ABDOMEN FOR TUBE PLACEMENT   Final Result      Feeding tube tip at the distal stomach.      These findings were discussed with MARLENE BRENNER on 6/18/2023 5:01 PM.         DX-ABDOMEN FOR TUBE PLACEMENT   Final Result         Feeding tube with tip projecting over the expected area of the first portion duodenum.      IR-EXTREMITY ANGIOGRAM-UNILATERAL RIGHT    (Results Pending)          Assessment/Plan  Problem Representation:    * Anemia associated with acute blood loss- (present on admission)  Assessment & Plan  Presents to the ED with hemoglobin 6.1, blood loss from AV fistula site at dialysis center.  Per patient she had blood loss at dialysis last Friday for which  she came to the ED and required a stitch to be placed.  Also had blood loss from AV fistula site at dialysis on Monday.  History of having transfusion reactions requiring Benadryl and getting a blood transfusion with dialysis.  Anemia likely due to acute blood loss from AV fistula, coagulopathy labs show elevated INR 1.34 with TEG findings largely normal with no indication for FFP or cryo.  -Nephrology consulted, appreciate recs: plan to continue dialysis routine MWF  -Ativan prn for anxiety for dialysis (d/c benadryl IV given patient getting ativan IV)  -Epistaxis 6/16 with Hgb of 6.4, transfused 1upRBC with dialysis  -AV fistula bleed 6/19 - see AV fistula  -6/22 no active bleed but hemoglobin 6.4 with repeat 6.7 likely in relation to ESRD and AV fistula bleed from day before. 1 unit transfusion 6/22  -Hgb 8.6 today, qam H&H     ACP (advance care planning)  Assessment & Plan  06/25/23: Lengthy discussion with mother Chikis regarding patient's prognosis and goals of care. Medical team has been very carolann with both patient and family regarding poor prognosis. Mother understands patient's poor prognosis. Discussed that patient has refused NG tube and when eventually placed had auto-removed, refusing PO meds, and not having much PO intake. Discussed what patient's wishes have been, and what they were prior to hospitalization. Discussed what hospice is, what it entails, and how this would be implemented at home. Discussed need for discontinuation of hemodialysis in order to participate in hospice care. Discussed no further hospital admissions. Discussed that pain and anxiety medications would continue to provide comfort. Conservative estimate of 1 month given regarding life expectancy, as hemodialysis would be discontinued, IV antibiotics discontinued, and anything that doesn't provide comfort would likely be discontinued. Patient's mother understands that that is a conservative estimate, and life expectancy may be  much shorter. Patient's mother thinks that hospice may be the best for her, but wants to discuss further with family. Patient's sister Jaz was also spoken to over speaker phone regarding patient's prognosis, goals of care, and consideration of home hospice as noted above. Both mother and sister will discuss with family tonight and will make a decision tomorrow. Code status discussed as well, as patient currently DNAR, I Okay. Mother would like to wait until tomorrow regarding code status discussion as well.    -Reassess tomorrow with possible discharge home with hospice    Bacteremia of undetermined etiology  Assessment & Plan  6/20 BCXx2 gram negative rods, unknown etiology for infection. No skin ulcers, does have bleeding/open wound during fistula bleeds. Given gram (-) rods, concern for abdominal source but CT abd/pelvis without evidence of abscess or inflammation. Also no abd pain on examination.  -Zosyn started 6/21 with decrease in fever  -Continue on with Zosyn given blood cultures positive for E. coli sensitive to Zosyn and imaging finding concerning for aspiration pna  -BCx 06/22/23 NGTD    Fever  Assessment & Plan  Resolved, no fevers  Fever night and am of 06/19-06/20  Leukocytosis, tachycardia without an identifiable infectious source. No sacral ulcers, no warm/swollen/painful joints. Procal 42.2, lactic acid 2.4. ESR 50, CRP 11.93  -tylenol suppository for fever  -BCX 6/20 positive for gram - rods  -zosyn 6/21 start, switch to Unasyn on 6/22.  Blood cultures returned with E. coli sensitive to Zosyn and returned antibiotic course to Zosyn  -BCx 06/22/23 NGTD    Copper deficiency  Assessment & Plan  Nutrition input on diet    Vitamin A deficiency  Assessment & Plan  Nutrition input on diet    Heart failure with reduced ejection fraction (HCC)  Assessment & Plan   Echo EF 45%, moderate concentric LVH, global hypokinesis, G1 diastolic dysfunction, mild aortic insufficiency   [facility]  currently having issues intaking po meds. controlling nausea    Fe deficiency anemia  Assessment & Plan  Fe panel with Fe 59, TIB and Fe% uncalculable, unsat Fe <17 likely due to ESRD and malnutrition  -epoetin with dialysis      Subclinical hypothyroidism  Assessment & Plan  T4 0.9 at lower end of range, TSH 9.130. Likely related to malnutrition rather than indicative of hypothyroidism.  Holding off on synthroid as given patient's malnutrition will not want to increase metabolism.     Disorder of electrolytes  Assessment & Plan  -monitor and replete as needed K>4, PO4>3, Mg>2    Metabolic alkalosis  Assessment & Plan  Resolved  Potential causes of nausea, patient does not have hyperkalemia or diarrhea etiology potentially associated with ESRD      Systemic lupus erythematosus with organ system involvement (HCC)- (present on admission)  Assessment & Plan  - Continue home Plaquenil, mycophenolate, prednisone    Hyponatremia- (present on admission)  Assessment & Plan  Improving to 132  Initially sodium 128 moderate hyponatremia, historically has mild to moderate with  lowest 120.  Etiology likely considerable due to malnutrition and ESRD  -Continues to receive D10  -1L fluid restriction per nephrology  -Continue to monitor    Severe protein-calorie malnutrition (HCC)- (present on admission)  Assessment & Plan  -dobhoff placed 6/19, couldn't tolerate overnight and removed  Patient does not want NG tube. Poor access and need to preserve vasculature for possible need for future access, so no TPN at this time (and was on hold due to bacteremia)  -Encourage pt and mom for pt's increased oral intake, pt taking ensure and will try chicken noodle soup today  -Nutrition consult  -Vitamins, copper low, multivitamin po  -Low Zn replete with 50mg daily  -prealbumin 5  -Boost with meals  -Calorie count  -Gi consulted, on marinol.   -Psychiatry consulted recs made for scheduled ativan and benadryl, remeron switch to lexapro 20 mg,  zyprexa continue at current dose.  -Psychology consult placed  -palliative care consult placed   -Echo EF 45%, moderate concentric LVH, global hypokinesis, G1 diastolic dysfunction, mild aortic insufficiency   -ACTH slight elevation, cortisol within range: doesn't support adrenal insufficiency as cause to n/v  -Continue with D10 50 cc/h  -Per Dr. Fortune 06/24/23, patient is incapacitated to make medical or placement decisions  -Pending speech cognitive evaluation  -Refer to ACP regarding discussion with family; reassess tomorrow regarding possible hospice    Chronic respiratory failure with hypoxia (HCC)- (present on admission)  Assessment & Plan  Home oxygen requirement 5LNC, no increased requirement on admission. Hx of pulmonary HTN  -monitor oxygen requirement    Pancytopenia (HCC)- (present on admission)  Assessment & Plan  Chronic, potential relation to SLE and medications  -continue to monitor   -coagulation labs sig for elevated INR 1.47    HTN (hypertension)- (present on admission)  Assessment & Plan  -cont home clonidine patch  -hydralazine prn  - discontinue amlodipine in setting of constipation    Arteriovenous fistula, acquired (HCC)- (present on admission)  Assessment & Plan  No current bleeding  Coagulation studies show slight elevation INR 1.47, TEG no indication for FFT/cryo.  -Bleeding recurred 6/19 Vascular surgery Dr. Emerson took patient for right fistulogram for treatment of central venous stenosis with venoplasty.  -Outpatient followup with vascular surgeon  3-6 months  -fistula cleared for dialysis         VTE prophylaxis: SCDs/TEDs and pharmacologic prophylaxis contraindicated due to bleeding risk    I have performed a physical exam and reviewed and updated ROS and Plan today (6/25/2023). In review of yesterday's note (6/24/2023), there are no changes except as documented above.

## 2023-06-25 NOTE — CARE PLAN
Problem: Psychosocial  Goal: Patient's level of anxiety will decrease  Outcome: Not Progressing     Problem: Pain - Standard  Goal: Alleviation of pain or a reduction in pain to the patient’s comfort goal  6/25/2023 1650 by CHANCE HurstN.  Outcome: Progressing  6/25/2023 1649 by Ksuh Gruber R.N.  Outcome: Progressing     Problem: Knowledge Deficit - Standard  Goal: Patient and family/care givers will demonstrate understanding of plan of care, disease process/condition, diagnostic tests and medications  Outcome: Progressing   The patient is Watcher - Medium risk of patient condition declining or worsening    Shift Goals  Clinical Goals: increase PO intake, reduce nausea/pain, comfort  Patient Goals: go home  Family Goals: FAHAD    Progress made toward(s) clinical / shift goals:  Minimally increased PO intake, pt appears to be improving with q4hr IV zofran added. Alternating IV dilaudid and IV ativan for more wakefulness/attention to eat. Per mom and pt decision, plan for pt to have Iris feeding tube placed per ICU when ICU RN available; unable to do so on dayshift.    Patient is not progressing towards the following goals: Anxiety continues even with use of IV ativan.      Problem: Psychosocial  Goal: Patient's level of anxiety will decrease  Outcome: Not Progressing

## 2023-06-25 NOTE — DISCHARGE PLANNING
ATTN: Case Management   RE: Referral for Home Health     Reason for referral denial: We are unable to accept today due to insurance capacity - We are rapidly assessing our census for discharge opportunities.  At this time we are only able to accept referrals with [employer] insurance.               Unfortunately, we are not able to accept this referral for the reason listed above. If further clarity is needed, our Transitional Care Specialists are available to discuss any barriers to service at [phone].       We look forward to collaborating with you in the future,   [facility] Team

## 2023-06-25 NOTE — CARE PLAN
The patient is Watcher - Medium risk of patient condition declining or worsening    Problem: Pain - Standard  Goal: Alleviation of pain or a reduction in pain to the patient’s comfort goal  Outcome: Progressing  Note: Patients current pain regiment is managing pain level.     Problem: Self Care  Goal: Patient will have the ability to perform ADLs independently or with assistance (bathe, groom, dress, toilet and feed)  Outcome: Progressing  Note: Progressing at patient with promoting ambulation, and intake.     Shift Goals  Clinical Goals: Increase intake/Monitor pain/nausea/Comfort  Patient Goals: Pain management  Family Goals: FAHAD    Progress made toward(s) clinical / shift goals:  Managing pain; promoting comfort, and promoting intake.

## 2023-06-25 NOTE — ASSESSMENT & PLAN NOTE
06/25/23: Lengthy discussion with mother Chikis regarding patient's prognosis and goals of care. Medical team has been very carolann with both patient and family regarding poor prognosis. Mother understands patient's poor prognosis. Discussed that patient has refused NG tube and when eventually placed had auto-removed, refusing PO meds, and not having much PO intake. Discussed what patient's wishes have been, and what they were prior to hospitalization. Discussed what hospice is, what it entails, and how this would be implemented at home. Discussed need for discontinuation of hemodialysis in order to participate in hospice care. Discussed no further hospital admissions. Discussed that pain and anxiety medications would continue to provide comfort. Conservative estimate of 1 month given regarding life expectancy, as hemodialysis would be discontinued, IV antibiotics discontinued, and anything that doesn't provide comfort would likely be discontinued. Patient's mother understands that that is a conservative estimate, and life expectancy may be much shorter. Patient's mother thinks that hospice may be the best for her, but wants to discuss further with family. Patient's sister Jaz was also spoken to over speaker phone regarding patient's prognosis, goals of care, and consideration of home hospice as noted above. Both mother and sister will discuss with family tonight and will make a decision tomorrow. Code status discussed as well, as patient currently DNAR, I Okay. Continue reassessing.

## 2023-06-26 PROBLEM — R50.9 FEVER: Status: RESOLVED | Noted: 2023-01-01 | Resolved: 2023-01-01

## 2023-06-26 NOTE — PROGRESS NOTES
[facility] Internal Medicine Daily Progress Note    Date of Service  6/26/2023    [facility] Team: R IM Orange Team   Attending: Joel Harrison M.d.  Senior Resident: Dr. Kt Deras  Intern:  Dr. Barton  Contact Number: [phone]    Chief Complaint  Lily Nicole is a 27 y.o. female admitted 6/14/2023 with Acute blood loss from right arm AV fistula    Hospital Course  Lily Nicole is a 27 y.o. female with significant medical history of SLE glomerulonephritis syndrome with  ESRD on hemodialysis (on plaquenil, mycophenolate, prednisone), malnutrition, seizure associated with hypoglycemia, HTN, constipation with chronic opioid use, migraines, pulmonary hypertension on 5LNC, who presented 6/14/2023 with acute blood loss anemia from her AV fistula (placed 11-12 yrs ago), Hb at the dialysis center was 6.1. Patient transfused 1 uPRBC irradiated with dialysis and benadryl 50mg IV due to history of shortness of breath associated with transfusions. Hemoglobin stabilized at 9.1. Her severe malnutrition status has been discussed, as well as associated hypoglycemia.  Patient could not undergo upper GI series as could not swallow barium.  It is presumed the cause is severely low appetite and intractable nausea. Dobbhoff was placed and was removed as patient could not tolerate. GI and General Surgery have been called regarding placing a G tube or PEG, at this time recs made for further studies and possible NG tube placement given her current condition she has a high risk of bleeding and difficulty healing. On palliative care discussions.     On 6/19 patient continued to bleed from AV fistula. Vascular surgery consulted and patient underwent right fistulogram for treatment of central venous stenosis with venoplasty. Follow up with Dr. Dotson outpatient in 3-6 months to evaluate for central venous stenosis recurrence.     Patient was spiking fevers and found to have E. Coli bacteremia. Started on Zosyn. CT thorax  "abdomen pelvis without evidence of abscess or abdominal infection.  Possible source is aspiration pneumonia, CXR with bilateral pulmonary infiltration.    Per Dr. Fortune 06/24/23, patient is incapacitated to make medical or placement decisions. On 6/26 patient had a lengthy discussion with mother Chikis regarding patient's prognosis and goals of care. Mother understands patient's poor prognosis.Hospice care was discussed. Decided to pursue NG tube.     Interval Problem Update  No acute events overnight. VS stable, O2 reqs stable at 4lt.   WBC 3.8, Hb 7.5 (microcytic but RDW 72), Na 127, Cl 93, Cr 4.29 BUN 33 AST 11 Albumin 1.9 Globulin 6.2.   Iron low unsat. Ferritin 1072.    Patient reports \"pain all over\". Does not want to eat. \"I want to get the tube and go home\". Does not provide further history. Poor eye contact.     I have discussed this patient's plan of care and discharge plan at IDT rounds today with Case Management, Nursing, Nursing leadership, and other members of the IDT team.    Consultants/Specialty  general surgery, GI, nephrology, psychiatry, and psychology and palliative care    Code Status  DNAR, I OK    Disposition  Medically ClearedI have placed the appropriate orders for post-discharge needs.    Physical Exam  Temp:  [35.8 °C (96.5 °F)-36.3 °C (97.3 °F)] 36.2 °C (97.2 °F)  Pulse:  [74-93] 93  Resp:  [14-17] 16  BP: (117-151)/() 151/101  SpO2:  [90 %-95 %] 95 %    Physical Exam  Constitutional:       General: She is not in acute distress.     Appearance: She is ill-appearing. She is not toxic-appearing or diaphoretic.      Comments: Cachectic with temporal wasting, fatigued.     HENT:      Head: Normocephalic and atraumatic.      Right Ear: External ear normal.      Left Ear: External ear normal.      Nose: Nose normal.      Mouth/Throat:      Mouth: Mucous membranes are dry.   Eyes:      General: No scleral icterus.        Right eye: No discharge.         Left eye: No discharge.      " Extraocular Movements: Extraocular movements intact.      Conjunctiva/sclera: Conjunctivae normal.   Cardiovascular:      Rate and Rhythm: Normal rate and regular rhythm.      Pulses: Normal pulses.      Heart sounds: Normal heart sounds. No murmur heard.  Pulmonary:      Effort: Pulmonary effort is normal. No respiratory distress.      Breath sounds: Normal breath sounds.   Abdominal:      General: Abdomen is flat. There is no distension.      Palpations: Abdomen is soft.      Tenderness: There is no abdominal tenderness.   Musculoskeletal:         General: Normal range of motion.      Cervical back: Normal range of motion.      Right lower leg: No edema.      Left lower leg: No edema.      Comments: Right arm with a/v fistula without bleeding. Diffuse muscle and subcutaneous fat loss.    Skin:     General: Skin is warm and dry.      Coloration: Skin is pale.      Comments: Very dry skin and hair.    Neurological:      General: No focal deficit present.      Mental Status: She is alert and oriented to person, place, and time.      Motor: Weakness present.      Comments: Hypotonic, atrophic muscles.   Psychiatric:         Mood and Affect: Mood normal.         Behavior: Behavior normal.         Fluids    Intake/Output Summary (Last 24 hours) at 6/26/2023 1343  Last data filed at 6/26/2023 0208  Gross per 24 hour   Intake 280 ml   Output --   Net 280 ml       Laboratory  Recent Labs     06/24/23  0702 06/25/23  0211 06/26/23  0438   WBC 4.4* 5.2 3.8*   RBC 3.12* 3.09* 3.09*   HEMOGLOBIN 7.6* 7.4* 7.5*   HEMATOCRIT 25.1* 25.5* 25.1*   MCV 80.4* 82.5 81.2*   MCH 24.4* 23.9* 24.3*   MCHC 30.3* 29.0* 29.9*   RDW 67.8* 73.5* 72.3*   PLATELETCT 165 167 195     Recent Labs     06/24/23  0702 06/25/23  0211 06/26/23  0438   SODIUM 130* 129* 127*   POTASSIUM 4.2 4.2 4.7   CHLORIDE 96 94* 93*   CO2 25 24 22   GLUCOSE 82 72 70   BUN 21 27* 33*   CREATININE 2.81* 3.41* 4.29*   CALCIUM 8.3* 8.3* 8.5                    Imaging  DX-ABDOMEN FOR TUBE PLACEMENT   Final Result      1.  Enteric tube tip projects over the proximal duodenum   2.  Increased bibasilar pulmonary opacities could be atelectasis or pneumonia      CT-CHEST,ABDOMEN,PELVIS WITH   Final Result      1.  No definite CT evidence for mesenteric ischemia or necrosis.   2.  Findings consistent with bilateral multifocal pneumonitis.   3.  Cardiomegaly.   4.  Enlargement of the main pulmonary artery which is suggestive of pulmonary arterial hypertension.   5.  Hepatomegaly.      6.  Splenomegaly.   7.  Status post cholecystectomy.   8.  Enlarged mediastinal, axillary and retroperitoneal lymph nodes of uncertain etiology and significance. Findings appear similar to the previous exam.   9.  Distal right subclavian vein metallic stent.   10.  Diffuse anasarca.      IR-US GUIDED PIV   Final Result    Ultrasound-guided PERIPHERAL IV INSERTION performed by    qualified nursing staff as above.      [id number] VIEW   Final Result         Feeding tube with tip projecting over the expected area of the first portion duodenum.      EC-ECHOCARDIOGRAM COMPLETE W/O CONT   Final Result      DX-ABDOMEN FOR TUBE PLACEMENT   Final Result      Feeding tube tip at the distal stomach.      These findings were discussed with MARLENE BRENNER on 6/18/2023 5:01 PM.         DX-ABDOMEN FOR TUBE PLACEMENT   Final Result         Feeding tube with tip projecting over the expected area of the first portion duodenum.      IR-EXTREMITY ANGIOGRAM-UNILATERAL RIGHT    (Results Pending)          Assessment/Plan:    * Severe protein-calorie malnutrition (HCC)- (present on admission)  Assessment & Plan  Multifactorial, unclear if dysphagia (did not tolerate barium test) and very poor appetite. Acute on chronic. 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 Patient is cachectic, has low prealbumin and albumin, low marcelina and Zn.  - Dobhoff placed 6/19,  couldn't tolerate overnight and removed  - Very hard to encourage PO despite family attempts   - Nutrition on consult  - Vitamins, multivitamin po  - Boost with meals  - GI consulted, on marinol.   - Psychiatry consulted recs made for scheduled ativan and benadryl, remeron switch to lexapro 20 mg, zyprexa continue at current dose.  - Psychology on consult   - Palliative care consult placed   - Continue with D10 50 cc/h  - Per Dr. Fortune 06/24/23, patient is incapacitated to make medical or placement decisions as she does not verbalize her current medical condition.  - Team discussed hospice if no nutrition achieved, patient and mom opted for [place] NG tube placed 6/26 successfully postpyloric, however, pt's prognosis is still poor.    ACP (advance care planning)  Assessment & Plan  06/25/23: Lengthy discussion with mother Chikis regarding patient's prognosis and goals of care. Medical team has been very carolann with both patient and family regarding poor prognosis. Mother understands patient's poor prognosis. Discussed that patient has refused NG tube and when eventually placed had auto-removed, refusing PO meds, and not having much PO intake. Discussed what patient's wishes have been, and what they were prior to hospitalization. Discussed what hospice is, what it entails, and how this would be implemented at home. Discussed need for discontinuation of hemodialysis in order to participate in hospice care. Discussed no further hospital admissions. Discussed that pain and anxiety medications would continue to provide comfort. Conservative estimate of 1 month given regarding life expectancy, as hemodialysis would be discontinued, IV antibiotics discontinued, and anything that doesn't provide comfort would likely be discontinued. Patient's mother understands that that is a conservative estimate, and life expectancy may be much shorter. Patient's mother thinks that hospice may be the best for her, but wants to discuss  further with family. Patient's sister Jaz was also spoken to over speaker phone regarding patient's prognosis, goals of care, and consideration of home hospice as noted above. Both mother and sister will discuss with family tonight and will make a decision tomorrow. Code status discussed as well, as patient currently DNAR, I Okay. Continue reassessing.      Bacteremia of undetermined etiology  Assessment & Plan  6/20 BCXx2 gram negative rods, unknown etiology for infection. No skin ulcers, does have bleeding/open wound during fistula bleeds. Given gram (-) rods, concern for abdominal source but CT abd/pelvis without evidence of abscess or inflammation. Also no abd pain on examination.  -Zosyn started 6/21 with decrease in fever  -Continue on with Zosyn given blood cultures positive for E. coli sensitive to Zosyn and imaging finding concerning for aspiration pna  -BCx 06/22/23 NGTD    Copper deficiency  Assessment & Plan  Nutrition input on diet    Vitamin A deficiency  Assessment & Plan  Nutrition input on diet    Heart failure with reduced ejection fraction (HCC)  Assessment & Plan   Echo EF 45%, moderate concentric LVH, global hypokinesis, G1 diastolic dysfunction, mild aortic insufficiency. Likely associated to malnutrition, possible beriberi. Patient is euvolemic.   -Difficult to start GDMT for now, prioritize PO intake     Fe deficiency anemia  Assessment & Plan  Fe panel with Fe 59, TIB and Fe% uncalculable, unsat Fe <17 likely due to ESRD and malnutrition  -epoetin with dialysis      Subclinical hypothyroidism  Assessment & Plan  T4 0.9 at lower end of range, TSH 9.130. Likely related to malnutrition rather than indicative of hypothyroidism.  Holding off on synthroid as given patient's malnutrition will not want to increase metabolism.     Disorder of electrolytes  Assessment & Plan  -monitor and replete as needed K>4, PO4>3, Mg>2    Metabolic alkalosis  Assessment & Plan  Resolved  Potential causes of  nausea, patient does not have hyperkalemia or diarrhea etiology potentially associated with ESRD      Systemic lupus erythematosus with organ system involvement (HCC)- (present on admission)  Assessment & Plan  - Continue home Plaquenil, mycophenolate, prednisone    Hyponatremia- (present on admission)  Assessment & Plan  Improving to 132  Initially sodium 128 moderate hyponatremia, historically has mild to moderate with  lowest 120.  Etiology likely considerable due to malnutrition and ESRD  -Continues to receive D10  -1L fluid restriction per nephrology  -Continue to monitor    Chronic respiratory failure with hypoxia (HCC)- (present on admission)  Assessment & Plan  Hx of pulmonary HTN, unclear cause. Home oxygen requirement 5LNC, no increased requirement on admission.   -O2 protocol  - Monitor     Anemia associated with acute blood loss- (present on admission)  Assessment & Plan  Presents to the ED with hemoglobin 6.1, blood loss from AV fistula site at dialysis center.  Also had blood loss from AV fistula site at dialysis on Monday. Anemia likely due to acute blood loss from AV fistula on chronic inflammatory anemia, coagulopathy labs show elevated INR 1.34 with TEG findings largely normal with no indication for FFP or cryo. Required 1 pRBC. Improving, stable, no active bleed.   -Care of AV fistula per RN  -qam H&H     Pancytopenia (HCC)- (present on admission)  Assessment & Plan  Chronic, potential relation to SLE and malnutrition.   -continue to monitor   -coagulation labs sig for elevated INR 1.47    HTN (hypertension)- (present on admission)  Assessment & Plan  -cont home clonidine patch  -hydralazine prn    Arteriovenous fistula, acquired (HCC)- (present on admission)  Assessment & Plan  Admitted with profuse bleeding. Resolved.   -Bleeding recurred 6/19 Vascular surgery Dr. Eemrson took patient for right fistulogram for treatment of central venous stenosis with venoplasty.  -Outpatient followup with  vascular surgeon  3-6 months  -fistula cleared for dialysis         VTE prophylaxis: SCDs/TEDs and pharmacologic prophylaxis contraindicated due to bleeding risk    I have performed a physical exam and reviewed and updated ROS and Plan today (6/26/2023). In review of yesterday's note (6/25/2023), there are no changes except as documented above.

## 2023-06-26 NOTE — DIETARY
"Nutrition Support Assessment   Day 12 of admit.  Lily Nicole is a 27 y.o. female with admitting DX of anemia associated with acute blood loss, sinus tachycardia seen on cardiac monitor.     Current problem list:  Bacteremia of undetermined etiology  Heart failure with reduced ejection fraction  Vitamin A deficiency  Copper deficiency  Subclinical hypothyroidism  Iron deficiency anemia  Metabolic alkalosis  Disorder of electrolytes  Systemic lupus erythematosus with organ system involvement  Hyponatremia  Severe protein-calorie malnutrition  Chronic respiratory failure with hypoxia  Anemia associated with acute blood loss  Pancytopenia  Hypertension  Arteriovenous fistula, acquired     Assessment:  Estimated Nutritional Needs: based on:   Height: 165.1 cm (5' 5\")  Weight: 43.5 kg (95 lb 14.4 oz)  Weight to Use in Calculations: 43.5 kg (95 lb 14.4 oz) - most recent bed scale wt  Ideal Body Weight: 56.7 kg (125 lb)  Percent Ideal Body Weight: 76.7  Body mass index is 15.96 kg/m²., BMI classification: underweight    Calculation/Equation: MSJ x 1.2 = 1418 kcals/day  Total Calories / day: 1418 - 1523 (Calories / k - 35)  Total Grams Protein / day: 52 - 61 (Grams Protein / k.2 - 1.4)  Total estimated fluid needs/day: 1525 mL (35 mL/kg)     Evaluation:   Nephrology following, pt continues to receive HD M/W/F. Recs for limiting free water in most recent MD note.Also per Nephrology, encourage protein intake, no renal diet restrictions.   Iris tube placed, verified in the proximal duodenum.  Sodium 127, BUN 33, Creatinine 4.29, AST 11, Albumin 1.9, CRP 11.93 ()  Decadron, Folvite, Cellcept, Pericolace, Theragran-M, Thiamine, Zinc Sulfate per MAR.   Pt is on a concurrent Regular diet with 1000 mL fluid restriction. Pt with continued poor PO intake (0-<25% of meals and snacks).  Pt continues to receive Boost Glucose Control and Magic Cup once per day.   Tube feeding to meet 100% of estimated nutrition needs. "   Standard tube feeding formula can meet pt's estimated nutrition needs.     Malnutrition Risk: No new risk identified.     Recommendations/Plan:  Start Fibersource HN @ 25 mL/hr, advance per protocol to goal rate of 50 mL/hr, providing 1440 kcals, 65 grams of protein and 972 mL of free water per day.  Fluids per MD.  PO diet per MD.    RD following.

## 2023-06-26 NOTE — PROGRESS NOTES
Telemetry Report: PM of 06/25/2023    Rhythm: NSR      Heart Rate:  69-82    Ectopy:    PVC x 1       MO:  .16         QRS: .10        QT: .33

## 2023-06-26 NOTE — CARE PLAN
The patient is Watcher - Medium risk of patient condition declining or worsening    Shift Goals  Clinical Goals: HD, possible Iris FT placement, reduced nausea/pain  Patient Goals: comfort  Family Goals: FT placement    Progress made toward(s) clinical / shift goals:    Problem: Pain - Standard  Goal: Alleviation of pain or a reduction in pain to the patient’s comfort goal  Outcome: Progressing     Problem: Knowledge Deficit - Standard  Goal: Patient and family/care givers will demonstrate understanding of plan of care, disease process/condition, diagnostic tests and medications  Outcome: Progressing    IRIS feeding tube successfully placed with KUB/MD Barton verification. Pt completing HD, to initiate Fibersource HN feeds upon return. Pain and nausea controlled with PRN dilaudid, zofran, and ativan.        Patient is not progressing towards the following goals:

## 2023-06-26 NOTE — DISCHARGE PLANNING
"HTH/SCP TCN chart review completed. Collaborated with LEXIS Nam. Current discharge considerations are home with HH vs. Home with hospice. Note patient on HD M,W,F. She is also on IV ABX.     Per Dr. Fortune's note 06/24/23, patient is incapacitated to make medical or placement decisions.    PT eval completed with recommendation for \"home health PT trial if participation/mood does not improve then transition to hospice as medically appropriate.\"    OT eval order discontinued as patient continues to refuse and wants to discharge home.    TCN spoke with patient's mother, Chikis, over the phone. She gave verbal consent for  choice. Form faxed to [facility]. Note [facility] has declined. [facility] or CM to send referral to [address] [place] [address] Monday 6/26/23.    TCN will continue to follow and collaborate with discharge planning team as additional post acute needs arise. Thank you.    Completed:  Psych eval with recs that patient does NOT have capacity  OT order discontinued as patient continues to refuse and wants to discharge home  Choice obtained:  6/25  [facility] confirmed 6/15/2023  Palliative Medicine consult  [facility] referral ( pt declined [facility] services 6/15/2023    *Addendum 1800 - Per Dr. Deras's note today, \"patient has refused NG tube and when eventually placed had auto-removed, refusing PO meds, and not having much PO intake. Patient's mother thinks that hospice may be the best for her, but wants to discuss further with family. Both mother and sister will discuss with family tonight and will make a decision tomorrow.\"     "

## 2023-06-26 NOTE — DISCHARGE PLANNING
" HTH/SCP TCN Hannah RN  chart review completed.   Collaborated with  Sammi.   Attempted to see pt at bedside. However, currently getting feeding tube placed at bedside.  Per Chart Review:  -6/26/2023 \" Iris feeding tube (43 inches, 10 Urdu) placed at 74cm in right nare. \"  -Provider notes by Dr Antonieta Barton M.D. on 6/26/2023  6:29 AM indicated pt is medically cleared. Furthermore MD noted \" Per Dr. Fortune 06/24/23, patient is incapacitated to make medical or placement decisions as she does not verbalize her current medical condition.\"  --[facility]  unable to accept pt------per Nathaly's notes 6/25/2023  4:32 PM.       TCN will continue to follow and collaborate with hospital case management discharge planning team as additional post acute needs arise. Thank you.      Completed:  SLP w/ recs for post-acute placement 6/26/2023  Psychiatry eval 6/24/2023  OT order discontinued as patient continues to refuse and wants to discharge home  Choice obtained: HH 6/25/2023  [facility] confirmed 6/15/2023  Palliative Medicine consulted  [facility] referral ( pt declined [facility] services 6/15/202      ADDENDUM   6/26/2023  3:16 PM. TCN was alerted ( via Voalte) by LEXIS Chapa that [facility]  and [facility] have declined  referral.    6/26/2023--8:48 PM --Current RD recommendations of \" Start Fibersource HN @ 25 mL/hr, advance per protocol to goal rate of 50 mL/hr, providing 1440 kcals, 65 grams of protein and 972 mL of free water per day.\"    "

## 2023-06-26 NOTE — PROCEDURES
Pt with ESRD, presented with failure to swipe.  Pt just had NG tube placement this morning.  Seen and examined while getting HD.

## 2023-06-26 NOTE — THERAPY
"Speech Language Pathology   Communication Evaluation     Patient Name: Lily Nicole  AGE:  27 y.o., SEX:  female  Medical Record #: [id number]  Date of Service: 2023      History of Present Illness  26 y/o female presented with bleeding from her AV fistula after dialysis and lasted 1.5 hours. Followed by psych, who recommended SLP cognitive evaluation. No previous SLP notes in Epic.     CMHx: Blood loss anemia, arteriovenous fistula, HTN, chronic respiratory failure w hypoxia, malnutrition, systemic lupus, HFrEF, fever, copper deficiency, bacteremia  PMHx: ESRD, hemorrhagic gastritis, lupus, UGIB, GERD, SIRS, anxiety, SOB    No recent head imaging.     General Information  Vitals  O2 (LPM): 4  O2 Delivery Device: Nasal Cannula  Level of Consciousness: Awake (Inconsistent alertness)  Patient Behaviors: Anxious, Flat Affect, Lethargic, Depressed, Crying, Withdrawn  Orientation: Oriented x 4  Follows Directives: Inconsistent      Prior Living Situation & Level of Function  Prior Services: Continuous (24 Hour) Care Giving Family  Lives with - Patient's Self Care Capacity: Parents  Communication: Unknown, suspect impaired cognition  Swallowing: Unknown, suspect WFL     Subjective  Pt somewhat agreeable and cooperative with SLP evaluation tasks. \"I just want to get it over with.\" Reported being poorly rested and in pain \"all over.\" Pt did stop responding and possibly fell asleep during test, so it was able to be completed.      Communication Domain(s)  Expressive Language: WFL  Receptive Language: WFL  Cognitive-Linguistic: Severe      Assessment  The patient was seen this date for a cognitive evaluation per psych recommendation.    CLQT (Cognitive Linguistic Quick Test)  Personal Facts:   Symbol Cancellation: 0/12   (with incorrect cancellations pt scored negative 25 out of 12)  Confrontation Namin/10  Clock Drawing: 3/13    Did not complete other sections of the test this date.     Clock Drawing  Poor " "organization with clock drawing task. Clock drawn scored 3/13 indicating severe per CLQT protocol. Errors as follows: perseverative and incomplete number sequence, two lines drawn bisecting each other and pointing to 12 and then two blank spaces.       Clinical Impressions  Pt presents with severe cognitive deficits. Would benefit from post-acute placement for further SLP service and for I with all IADLs. Will follow during acute stay to complete CLQT as pt participation allows.       NOTE: It is not within the scope of practice of Speech-Language Pathologists to determine patient capacity. Please defer to the physician or psych to complete this assessment.       Recommendations  Supervision Needs Upons Discharge: Direct assistance with IADLs (see below)  IADLs: Medication management, Financial management, Appointment management, Household chores, Cooking         SLP Treatment Plan  Treatment Plan: Cognitive Treatment, Patient/Family/Caregiver Training  SLP Frequency: 3x Per Week  Estimated Duration: Until Therapy Goals Met      Anticipated Discharge Needs  Discharge Recommendations: Recommend post-acute placement for additional speech therapy services prior to discharge home  Therapy Recommendations Upon DC: Cognitive-Linguistic Training, Community Re-Integration, Patient / Family / Caregiver Education      Patient / Family Goals  Patient / Family Goal #1: \"I just want my mom.\"  Short Term Goal # 1: Pt will complete CLQT to objectively evaluate cognitive function and assist with planning for discharge.      Zonia Solis, SLP  "

## 2023-06-26 NOTE — CARE PLAN
The patient is Watcher - Medium risk of patient condition declining or worsening    Problem: Pain - Standard  Goal: Alleviation of pain or a reduction in pain to the patient’s comfort goal  Outcome: Progressing  Note: Progressing at current pain regiment managing patients current pain level.     Problem: Self Care  Goal: Patient will have the ability to perform ADLs independently or with assistance (bathe, groom, dress, toilet and feed)  Outcome: Progressing  Note: Progressing at patient participating with care.     Shift Goals  Clinical Goals: Patient comfort/Pain managament  Patient Goals: Comfort/ Rest  Family Goals: FAHAD    Progress made toward(s) clinical / shift goals: Will encourage patient to increase intake, and patients pain will be controlled with current pain regiment.

## 2023-06-26 NOTE — DISCHARGE PLANNING
Agency/Facility Name: [facility]   Referral sent per Choice form @: [id number]    Agency/Facility Name: [facility]   Referral sent per Choice form @: 0000

## 2023-06-26 NOTE — PROGRESS NOTES
Iris Placement    Tube Team verified patient name and medical record number prior to tube placement. Iris feeding tube (43 inches, 10 Rw[place]an) placed at 74cm in right nare. Per Iris picture, tube appears to be in the small bowel.    Nursing Instructions: Await KUB to confirm placement before use for medications or feeding. Stylet, may be removed, please place in labeled bag with insufflation bulb and save in patient medication drawer.

## 2023-06-27 PROBLEM — D50.9 FE DEFICIENCY ANEMIA: Status: RESOLVED | Noted: 2023-01-01 | Resolved: 2023-01-01

## 2023-06-27 PROBLEM — E87.3 METABOLIC ALKALOSIS: Status: RESOLVED | Noted: 2023-01-01 | Resolved: 2023-01-01

## 2023-06-27 NOTE — DISCHARGE PLANNING
Case Management Discharge Planning    Admission Date: 6/14/2023  GMLOS: 5.2  ALOS: 13    6-Clicks ADL Score: 15  6-Clicks Mobility Score: 9  PT and/or OT Eval ordered: Yes  Post-acute Referrals Ordered: Yes  Post-acute Choice Obtained: Yes  Has referral(s) been sent to post-acute provider:  Yes      Anticipated Discharge Dispo: Discharge Disposition: D/T to home under HHA care in anticipation of covered skilled care (06)    DME Needed: No    Action(s) Taken: OTHER    Patient discussed during IDT rounds. Patient is not medically cleared at this time, team anticipates discharge tomorrow. Patient was accepted for home health services by [facility].    Per VAN Ramey, if patient requires transportation assistance home [facility] would authorize and pay for T transport home.     No other needs noted at this time. Patient is on 5L oxygen at home- supplied by [employer].    Escalations Completed: None    Medically Clear: No    Next Steps: LSW to follow up with patient and medical team regarding discharge needs and barriers.     Barriers to Discharge: Medical clearance

## 2023-06-27 NOTE — PROGRESS NOTES
"Nephrology Daily Progress Note    Date of Service  6/27/2023    Chief Complaint  27 y.o. female with a history of uncontrolled lupus, hypertension, ESRD on hemodialysis Monday Wednesday Friday who presented 6/14/2023 with prolonged bleeding from AV fistula and failure to thrive.    Interval Problem Update  6/15-patient had additional ultrafiltration treatment yesterday with 1 L removed.  Tolerated blood transfusion yesterday.  Complains of joint pain all over, and poor appetite.  She remains adamant she would never want \"tubes sticking out of me\" for enteral nutrition support.  6/18 -patient had ultrafiltration treatment yesterday with 0.5 L removed while receiving blood transfusion.  Denies chest pain, complains of some fatigue and shortness of breath.  She has lots of family members visiting her today.  6/19 -pulled NGT due to discomfort  Awaiting AVF revision due to episodes of prolonged bleeding post dialysis  Will postpone HD for tomorrow  6/22 -s/p AVF revision/angioplasty  -functioning well  No improvement in po food intake  Very weak, not ambulating  Mother at bed side  Anemia: low Hb level -scheduled blood transfusion  HD scheduled for tomorrow  6/24 -no improvement in po intake  Very weak, fatigue  HD MWF  6/25 -no improvement in patient condition,po intake  PT evaluated -not cooperating  HD [facility]  6/27 patient is sleeping as she got pain medication  Review of Systems  Review of Systems   Unable to perform ROS: Other    sleeping    Physical Exam  Temp:  [35.9 °C (96.7 °F)-36.8 °C (98.3 °F)] 36.2 °C (97.1 °F)  Pulse:  [] 89  Resp:  [16-18] 16  BP: (117-145)/(59-82) 136/79  SpO2:  [90 %-100 %] 92 %    Physical Exam  Vitals and nursing note reviewed.   Constitutional:       General: She is not in acute distress.     Appearance: Normal appearance. She is well-developed. She is cachectic. She is ill-appearing. She is not diaphoretic.   HENT:      Head: Normocephalic and atraumatic.      Right Ear: External " ear normal.      Left Ear: External ear normal.      Nose: Nose normal.   Eyes:      General: No scleral icterus.        Right eye: No discharge.         Left eye: No discharge.      Conjunctiva/sclera: Conjunctivae normal.   Cardiovascular:      Rate and Rhythm: Normal rate and regular rhythm.      Heart sounds: No murmur heard.  Pulmonary:      Effort: Pulmonary effort is normal. No respiratory distress.      Breath sounds: Normal breath sounds.   Musculoskeletal:         General: No tenderness.      Right lower leg: No edema.      Left lower leg: No edema.   Skin:     General: Skin is warm and dry.      Findings: No erythema.   Neurological:      General: No focal deficit present.      Mental Status: She is oriented to person, place, and time. She is lethargic.      Cranial Nerves: No cranial nerve deficit.   Psychiatric:         Mood and Affect: Mood normal.         Behavior: Behavior normal.     Dialysis access: Right upper arm AV fistula, patent bruit and thrill    Fluids    Intake/Output Summary (Last 24 hours) at 6/27/2023 1420  Last data filed at 6/27/2023 0610  Gross per 24 hour   Intake 1260 ml   Output 2000 ml   Net -740 ml         Laboratory  Labs reviewed, pertinent labs below.  Recent Labs     06/25/23  0211 06/26/23  0438 06/27/23  0309   WBC 5.2 3.8* 2.6*   RBC 3.09* 3.09* 3.53*   HEMOGLOBIN 7.4* 7.5* 8.4*   HEMATOCRIT 25.5* 25.1* 28.6*   MCV 82.5 81.2* 81.0*   MCH 23.9* 24.3* 23.8*   MCHC 29.0* 29.9* 29.4*   RDW 73.5* 72.3* 71.7*   PLATELETCT 167 195 205       Recent Labs     06/25/23  0211 06/26/23  0438 06/27/23  0309   SODIUM 129* 127* 132*   POTASSIUM 4.2 4.7 4.0   CHLORIDE 94* 93* 95*   CO2 24 22 27   GLUCOSE 72 70 72   BUN 27* 33* 18   CREATININE 3.41* 4.29* 2.97*   CALCIUM 8.3* 8.5 8.5                       Imaging interpreted by radiologist. Imaging reports reviewed with pertinent findings below  [id number] VIEW   Final Result      No evidence of bowel obstruction. No evidence of  constipation.                  DX-ABDOMEN FOR TUBE PLACEMENT   Final Result      1.  Enteric tube tip projects over the proximal duodenum   2.  Increased bibasilar pulmonary opacities could be atelectasis or pneumonia      CT-CHEST,ABDOMEN,PELVIS WITH   Final Result      1.  No definite CT evidence for mesenteric ischemia or necrosis.   2.  Findings consistent with bilateral multifocal pneumonitis.   3.  Cardiomegaly.   4.  Enlargement of the main pulmonary artery which is suggestive of pulmonary arterial hypertension.   5.  Hepatomegaly.      6.  Splenomegaly.   7.  Status post cholecystectomy.   8.  Enlarged mediastinal, axillary and retroperitoneal lymph nodes of uncertain etiology and significance. Findings appear similar to the previous exam.   9.  Distal right subclavian vein metallic stent.   10.  Diffuse anasarca.      IR-US GUIDED PIV   Final Result    Ultrasound-guided PERIPHERAL IV INSERTION performed by    qualified nursing staff as above.      [id number] VIEW   Final Result         Feeding tube with tip projecting over the expected area of the first portion duodenum.      EC-ECHOCARDIOGRAM COMPLETE W/O CONT   Final Result      DX-ABDOMEN FOR TUBE PLACEMENT   Final Result      Feeding tube tip at the distal stomach.      These findings were discussed with MARLENE BRENNER on 6/18/2023 5:01 PM.         DX-ABDOMEN FOR TUBE PLACEMENT   Final Result         Feeding tube with tip projecting over the expected area of the first portion duodenum.      IR-EXTREMITY ANGIOGRAM-UNILATERAL RIGHT    (Results Pending)         Current Facility-Administered Medications   Medication Dose Route Frequency Provider Last Rate Last Admin    losartan (COZAAR) tablet 25 mg  25 mg Oral Q DAY Antonieta Barton M.D.   25 mg at 06/27/23 1233    hydrocortisone (CORTEF) tablet 5 mg  5 mg Oral BID Antonieta Barton M.D.        metoprolol tartrate (LOPRESSOR) tablet 12.5 mg  12.5 mg Enteral Tube TWICE DAILY Antonieta Barton M.D.   12.5 mg  at 06/27/23 1233    ceFAZolin in dextrose (ANCEF) IVPB premix 1 g  1 g Intravenous Q24HRS Antonieta Barton M.D.   Stopped at 06/26/23 1850    Pharmacy Consult: Enteral tube insertion - review meds/change route/product selection  1 Each Other PHARMACY TO DOSE Kt Deras M.D.        acetaminophen (Tylenol) tablet 650 mg  650 mg Enteral Tube Q4HRS PRN Kt Deras M.D.        hydroxychloroquine (Plaquenil) tablet 200 mg  200 mg Enteral Tube QAM Kt Deras M.D.   200 mg at 06/27/23 1048    escitalopram (Lexapro) tablet 10 mg  10 mg Enteral Tube DAILY Kt Deras M.D.   10 mg at 06/27/23 1049    OLANZapine (ZYPREXA) tablet 2.5 mg  2.5 mg Enteral Tube Q EVENING Kt Deras M.D.   2.5 mg at 06/26/23 1824    ondansetron (ZOFRAN ODT) dispertab 4 mg  4 mg Enteral Tube Q4HRS PRN Kt Deras M.D.        senna-docusate (PERICOLACE or SENOKOT S) 8.6-50 MG per tablet 2 Tablet  2 Tablet Enteral Tube BID Kt Deras M.D.   2 Tablet at 06/26/23 1823    And    polyethylene glycol/lytes (MIRALAX) PACKET 1 Packet  1 Packet Enteral Tube QDAY PRN Kt Deras M.D.        And    magnesium hydroxide (MILK OF MAGNESIA) suspension 30 mL  30 mL Enteral Tube QDAY PRN Kt Deras M.D.        And    bisacodyl (DULCOLAX) suppository 10 mg  10 mg Rectal QDAY PRN Kt Deras M.D.        folic acid (FOLVITE) tablet 1 mg  1 mg Enteral Tube DAILY Kt Deras M.D.   1 mg at 06/27/23 1049    mycophenolate (CELLCEPT) 200 MG/ML susp 500 mg  500 mg Enteral Tube QAM Kt Deras M.D.   500 mg at 06/27/23 1049    therapeutic multivitamin-minerals (THERAGRAN-M) tablet 1 Tablet  1 Tablet Enteral Tube DAILY Kt Deras M.D.   1 Tablet at 06/27/23 1049    thiamine (Vitamin B-1) tablet 100 mg  100 mg Enteral Tube DAILY Kt Deras M.D.   100 mg at 06/26/23 1824    zinc sulfate (ZINCATE) capsule 220 mg  220 mg Enteral Tube DAILY Kt  TERRI Deras   220 mg at 06/27/23 1049    lansoprazole (Prevacid) solutab 30 mg  30 mg Enteral Tube DAILY Kt Deras M.D.        epoetin roque (EPOGEN/PROCRIT) injection 10,000 Units  10,000 Units Intravenous MO, WE + FR Melinda Trent M.D.   10,000 Units at 06/26/23 1639    acetaminophen (TYLENOL) suppository 650 mg  650 mg Rectal Q4HRS PRN Joel Harrison M.D.   650 mg at 06/20/23 0914    cloNIDine (CATAPRES) 0.2 MG/24HR patch 1 Patch  1 Patch Transdermal Q7 DAYS Raul Anthony M.D.   1 Patch at 06/26/23 1150    LORazepam (ATIVAN) injection 0.5 mg  0.5 mg Intravenous Q4HRS PRN Raul Anthony M.D.   0.5 mg at 06/27/23 0608    HYDROmorphone (Dilaudid) injection 0.5 mg  0.5 mg Intravenous Q4HRS PRN Raul Anthony M.D.   0.5 mg at 06/27/23 1232    sodium chloride (OCEAN) 0.65 % nasal spray 2 Spray  2 Spray Nasal Q2HRS PRN Raul Anthony M.D.        hydrALAZINE (APRESOLINE) injection 10 mg  10 mg Intravenous Q6HRS PRN Raul Anthony M.D.   10 mg at 06/19/23 1640    LORazepam (ATIVAN) injection 0.5 mg  0.5 mg Intravenous TID Raul Anthony M.D.   0.5 mg at 06/27/23 1050    dextrose 10 % BOLUS 25 g  25 g Intravenous Q15 MIN PRN Teddy Golden M.D.   Held at 06/23/23 0722    NS (BOLUS) infusion 250 mL  250 mL Intravenous DIALYSIS PRN Navin Metz M.D.        lidocaine (XYLOCAINE) 1 % injection 1 mL  1 mL Intradermal PRN Navin Metz M.D.   1 mL at 06/26/23 1320    ondansetron (ZOFRAN) syringe/vial injection 4 mg  4 mg Intravenous Q4HRS PRN Raul Anthony M.D.   4 mg at 06/27/23 0815         Assessment/Plan  27 y.o. female with a history of uncontrolled lupus, hypertension, ESRD on hemodialysis Monday Wednesday Friday who presented 6/14/2023 with prolonged bleeding from AV fistula and failure to thrive.     1.ESRD/HD .  2.  Dialysis access: RUE AVF -s/p revision/angioplasty   3. Anemia of chronic disease -on Epogen  4.  Failure to thrive , had NG tube placed  5.  Hyponatremia :  Better  6.  HTN.    Recs:  no acute need for HD today  Epogen with dialysis  Renal diet  Daily BMP, CBC.  Renal dose all meds  Avoid nephrotoxins like NSAIDs.  Prognosis guarded.

## 2023-06-27 NOTE — CARE PLAN
The patient is Watcher - Medium risk of patient condition declining or worsening    Shift Goals  Clinical Goals: increase tube feed to goal, pain control  Patient Goals: pain management  Family Goals: FT placement    Progress made toward(s) clinical / shift goals:        Problem: Nutrition  Goal: Patient's nutritional and fluid intake will be adequate or improve  Outcome: Progressing  Note: Tube feed at goal. Pt tolerating oral intake this shift without emesis. Nausea medications given per MAR       Patient is not progressing towards the following goals:      Problem: Pain - Standard  Goal: Alleviation of pain or a reduction in pain to the patient’s comfort goal  Outcome: Not Progressing  Note: Medicated patient per MAR for pain using dilaudid. Encouraged multimodal interventions as well such as heat, ice, positioning, and relaxation     Problem: Skin Integrity  Goal: Skin integrity is maintained or improved  Outcome: Not Progressing  Note: Assessed for signs of skin breakdown. Encouraging increased mobility and repositioning to prevent development of pressure ulcers. Q 2 turns in place. Mepilex in place on bilateral hips and sacrum.     Problem: Psychosocial  Goal: Patient's level of anxiety will decrease  Outcome: Not Progressing  Note: Patient is complaining of anxiety this shift. Interventions used to calm pt down include: attempted to redirect, distraction, offered food and drink, offered restroom. Interventions were unsuccessful . Pt medicated per MAR for anxiety with ativan.

## 2023-06-27 NOTE — CARE PLAN
The patient is Watcher - Medium risk of patient condition declining or worsening    Problem: Pain - Standard  Goal: Alleviation of pain or a reduction in pain to the patient’s comfort goal  Outcome: Progressing  Note: Current pain regiment managing patients pain level.      Problem: Nutrition  Goal: Enteral nutrition will be maintained or improve  Outcome: Progressing  Note: Patient tolerating tube feed. Goal rate is 50ml     Shift Goals  Clinical Goals: Paitient comfort/Increase tube feed  Patient Goals: Pain Management  Family Goals: FT placement    Progress made toward(s) clinical / shift goals: Patients pain will be managed by current pain regiment,and work towards patients goal rate for tube feed. Goal is 50 ml, current rate is 35ml

## 2023-06-27 NOTE — DISCHARGE PLANNING
Agency[facility]Facility Name: [facility]  Spoke To: Shaista  Outcome: DPA called to follow up on referral, per Shaista ins auth is approved they just need a HH order because there was not any. DPA will then resend referral.    Agency[facility] Name: [facility]  Referral sent per Choice form @: 0911

## 2023-06-27 NOTE — PROGRESS NOTES
Telemetry Report: Pm report 06/26/2023    Rhythm:   NSR    Heart Rate:  76-91    Ectopy:    PVC x 3      OH:   .23        QRS: .10        QT: .36

## 2023-06-27 NOTE — PROGRESS NOTES
[facility] Services Progress Note     Hemodialysis treatment ordered today per Dr. Fadi Najjar x 3 hours.   Treatment initiated at 1335 ; re set up mid tx due to impending clotting of venous chamber; no blood loss; completed tx time at 1700.      Patient tolerated tx; see electronic flow sheet for details.      Net UF 1000 mL.   UF limited by increased in heart rate at 120's; pt medicated for pain by primary nurse mid treatment. Vital signs stable post tx    Needles removed from access site. Dressings applied and sites held x 20 minutes; verified no bleeding. Positive bruit/thrill post tx.   Staff RN to monitor AVF/G for breakthrough bleeding. Should breakthrough bleeding occur, staff RN to apply pressure to access sites until bleeding resolved.   Notify Dialysis and Nephrologist for follow-up.     Report given to Primary RN Kush Gruber.

## 2023-06-27 NOTE — PROGRESS NOTES
[facility] Internal Medicine Daily Progress Note    Date of Service  6/27/2023    [facility] Team: R IM Orange Team   Attending: Joel Harrison M.d.  Senior Resident: Dr. Kt Deras  Intern:  Dr. Barton  Contact Number: [phone]    Chief Complaint  Lily Nicole is a 27 y.o. female admitted 6/14/2023 with Acute blood loss from right arm AV fistula    Hospital Course  Lily Nicole is a 27 y.o. female with significant medical history of SLE glomerulonephritis syndrome with  ESRD on hemodialysis (on plaquenil, mycophenolate, prednisone - on list for transplant at [facility]) +/- MCTD-scleroderma, malnutrition, hypoglycemia (c/b seizures), HTN, constipation with chronic opioid use, migraines, Strep pneumo bacteremia w/ suspected endocarditis and septic joints in 11/22 c/b VT arrest, pulmonary hypertension on 5LNC (unclear cause, possible lupus pneumonitis - pending RHC), who presented 6/14/2023 with acute blood loss anemia from her AV fistula (placed 11-12 yrs ago), Hb at the dialysis center was 6.1. Patient transfused 1 uPRBC irradiated with dialysis and benadryl 50mg IV due to history of shortness of breath associated with transfusions. Hemoglobin stabilized at 9.1. On 6/19 patient continued to bleed from AV fistula. Vascular surgery consulted and patient underwent right fistulogram for treatment of central venous stenosis with venoplasty. Follow up with Dr. Dotson outpatient in 3-6 months to evaluate for central venous stenosis recurrence.     During hospital admission it was evident patient's severe malnutrition with  associated hypoglycemia. History of chronic early satiety, epigastric pain, nausea, and intolerance to food and solids for many months. Patient had an upper GI bleed and EGD in 08/22 that showed GAVE/duodenitis. Here patient could not undergo upper GI series as could not swallow barium. Dobbhoff was placed and was removed as patient could not tolerate. GI and General Surgery have been  called regarding placing a G tube or PEG, at this time recs made for further studies and possible NG tube placement given her current condition she has a high risk of bleeding and difficulty healing. Patient and mom agreeable with an NG tube placed postpyloric on 6/26, advancing tube feeds.     Patient was spiking fevers and found to have E. Coli bacteremia. Started on Zosyn. CT thorax abdomen pelvis without evidence of abscess or abdominal infection.  Possible source is aspiration pneumonia, CXR with bilateral pulmonary infiltration. Based on susceptibilities, ID suggested to transition to Cefazolin.     Per Dr. Fortune 06/24/23, patient is incapacitated to make medical or placement decisions. On 6/26 patient had a lengthy discussion with mother Chikis regarding patient's prognosis and goals of care. Decided to pursue NG tube and attempt for recovering of nutritional status.     Interval Problem Update  No acute events overnight. VS stable, O2 reqs stable at 3lt.   WBC 2.6, Hb 8.4, normal platelets.     Patient complains of pain all over, had 2 large soft BM, KUB with no stool burden but dilated colon with gas. No N/V or regurgitation, tolerating tube feeds almost at goal. Attempting to wean off O2, currently on 3 lt.     I have discussed this patient's plan of care and discharge plan at IDT rounds today with Case Management, Nursing, Nursing leadership, and other members of the IDT team.    Consultants/Specialty  general surgery, GI, nephrology, psychiatry, and psychology and palliative care    Code Status  DNAR, I OK    Disposition  The patient is not medically cleared for discharge to home or a post-acute facility.  Anticipate discharge to: home with organized home healthcare and close outpatient follow-up  I have placed the appropriate orders for post-discharge needs.    Physical Exam  Temp:  [35.9 °C (96.7 °F)-36.8 °C (98.3 °F)] 36.2 °C (97.1 °F)  Pulse:  [] 89  Resp:  [16-18] 16  BP: (117-156)/(59-88)  136/79  SpO2:  [90 %-100 %] 92 %    Physical Exam  Constitutional:       General: She is not in acute distress.     Appearance: She is ill-appearing. She is not toxic-appearing or diaphoretic.      Comments: Cachectic with temporal wasting, fatigued.     HENT:      Head: Normocephalic and atraumatic.      Right Ear: External ear normal.      Left Ear: External ear normal.      Nose: Nose normal.      Mouth/Throat:      Mouth: Mucous membranes are dry.   Eyes:      General: No scleral icterus.        Right eye: No discharge.         Left eye: No discharge.      Extraocular Movements: Extraocular movements intact.      Conjunctiva/sclera: Conjunctivae normal.   Cardiovascular:      Rate and Rhythm: Normal rate and regular rhythm.      Pulses: Normal pulses.      Heart sounds: Normal heart sounds. No murmur heard.  Pulmonary:      Effort: Pulmonary effort is normal. No respiratory distress.      Breath sounds: Normal breath sounds.   Abdominal:      General: Abdomen is flat. There is no distension.      Palpations: Abdomen is soft.      Tenderness: There is no abdominal tenderness.   Musculoskeletal:         General: Normal range of motion.      Cervical back: Normal range of motion.      Right lower leg: No edema.      Left lower leg: No edema.      Comments: Right arm with a/v fistula without bleeding, multiple scars. Diffuse muscle and subcutaneous fat loss.    Skin:     General: Skin is warm and dry.      Coloration: Skin is pale.      Comments: Very dry skin and hair.    Neurological:      General: No focal deficit present.      Mental Status: She is alert and oriented to person, place, and time.      Motor: Weakness present.      Comments: Hypotonic, atrophic muscles.   Psychiatric:         Mood and Affect: Mood normal.         Behavior: Behavior normal.         Fluids    Intake/Output Summary (Last 24 hours) at 6/27/2023 1215  Last data filed at 6/27/2023 0610  Gross per 24 hour   Intake 1260 ml   Output 2000 ml    Net -740 ml       Laboratory  Recent Labs     06/25/23  0211 06/26/23  0438 06/27/23  0309   WBC 5.2 3.8* 2.6*   RBC 3.09* 3.09* 3.53*   HEMOGLOBIN 7.4* 7.5* 8.4*   HEMATOCRIT 25.5* 25.1* 28.6*   MCV 82.5 81.2* 81.0*   MCH 23.9* 24.3* 23.8*   MCHC 29.0* 29.9* 29.4*   RDW 73.5* 72.3* 71.7*   PLATELETCT 167 195 205     Recent Labs     06/25/23  0211 06/26/23  0438 06/27/23  0309   SODIUM 129* 127* 132*   POTASSIUM 4.2 4.7 4.0   CHLORIDE 94* 93* 95*   CO2 24 22 27   GLUCOSE 72 70 72   BUN 27* 33* 18   CREATININE 3.41* 4.29* 2.97*   CALCIUM 8.3* 8.5 8.5             Recent Labs     06/27/23  0309   TRIGLYCERIDE 120       Imaging  DX-ABDOMEN FOR TUBE PLACEMENT   Final Result      1.  Enteric tube tip projects over the proximal duodenum   2.  Increased bibasilar pulmonary opacities could be atelectasis or pneumonia      CT-CHEST,ABDOMEN,PELVIS WITH   Final Result      1.  No definite CT evidence for mesenteric ischemia or necrosis.   2.  Findings consistent with bilateral multifocal pneumonitis.   3.  Cardiomegaly.   4.  Enlargement of the main pulmonary artery which is suggestive of pulmonary arterial hypertension.   5.  Hepatomegaly.      6.  Splenomegaly.   7.  Status post cholecystectomy.   8.  Enlarged mediastinal, axillary and retroperitoneal lymph nodes of uncertain etiology and significance. Findings appear similar to the previous exam.   9.  Distal right subclavian vein metallic stent.   10.  Diffuse anasarca.      IR-US GUIDED PIV   Final Result    Ultrasound-guided PERIPHERAL IV INSERTION performed by    qualified nursing staff as above.      TI-LEHUHFO-8 VIEW   Final Result         Feeding tube with tip projecting over the expected area of the first portion duodenum.      EC-ECHOCARDIOGRAM COMPLETE W/O CONT   Final Result      DX-ABDOMEN FOR TUBE PLACEMENT   Final Result      Feeding tube tip at the distal stomach.      These findings were discussed with MARLENE BRENNER on 6/18/2023 5:01 PM.          DX-ABDOMEN FOR TUBE PLACEMENT   Final Result         Feeding tube with tip projecting over the expected area of the first portion duodenum.      IR-EXTREMITY ANGIOGRAM-UNILATERAL RIGHT    (Results Pending)   FK-FXTOIMF-1 VIEW    (Results Pending)          Assessment/Plan:    * Severe protein-calorie malnutrition (HCC)- (present on admission)  Assessment & Plan  Multifactorial, unclear if dysphagia component as patient did not tolerate barium test, patient had an EGD 9/2022 showing GAVE/duodenitis and possible infiltrative disease with motility disorder, has ongoing poor appetite and recurrent nausea ongoing for months. Patient is cachectic, has low prealbumin and albumin, low marcelina and Zn. Started on tube feeding (ND tube) and tolerating well.   - Dobhoff placed 6/19, couldn't tolerate overnight and removed. Repeated NG tube placement, postpyloric, successful 6/26  - Very hard to encourage PO despite family attempts   - Nutrition on consult  - Vitamins, multivitamin po  - Boost with meals  - GI consulted, on marinol   - Psychiatry consulted recs made for scheduled ativan and benadryl, remeron switch to lexapro 20 mg, zyprexa continue at current dose.  - Psychology on consult   - Team discussed hospice if no nutrition achieved, patient and mom opted for new ND tube placed 6/26 successfully postpyloric, currently almost at goal   - Monitoring weight   - HH orders placed for ND tube nutrition at home, dietitian to evaluate at home     ESRD (end stage renal disease) (HCC)- (present on admission)  Assessment & Plan  Lupus GN. On dialysis M/W/F. AV fistula complicated by the above, resolving. No known renal osteodystrophy.   - Nephrology on board  - Continued HD while inpt    Bacteremia of undetermined etiology  Assessment & Plan  Febrile episodes, 6/20 BCXx2 grew E. coli, unknown etiology for infection. No SSTI, CT abd/pelvis without evidence of abscess or lesions, possible aspiration pneumonia.  -Zosyn started 6/21,  transitioned to cefazolin 6/26-6/28 for 7 days therapy for GN bacteremia  -BCx repeated 06/22/23 NGTD    Copper deficiency  Assessment & Plan  Nutrition input on diet    Vitamin A deficiency  Assessment & Plan  Nutrition input on diet    Heart failure with reduced ejection fraction (HCC)  Assessment & Plan  Echo 6/23/23 EF 45%, moderate concentric LVH, global hypokinesis, G1-2 diastolic dysfunction, mild AI, MR, TR. No changes in echo from prior after bacteremia and endocarditis episode. Likely associated to malnutrition, beriberi is a possibility. Patient is euvolemic, O2 requirements decreasing.   - Start ARB - losartan 25 mg  - Start succinate 25 mg (no allergy that mother recalls, agrees with therapy)  - Spironolactone and SLGT2i not indicated in ESRD  - Monitor weight and I and Os    Subclinical hypothyroidism  Assessment & Plan  T4 0.9 at lower end of range, TSH 9.130. Likely related to malnutrition and inflammation rather than indicative of hypothyroidism.  - Monitor outpatient for considering supplementation    Disorder of electrolytes  Assessment & Plan  From poor PO intake and now has high risk for refeeding syndrome.   - Monitoring closely and repleting as needed K>4, PO4>3, Mg>2.    Systemic lupus erythematosus with organ system involvement (HCC)- (present on admission)  Assessment & Plan  Per  [facility] files possible component of connective tissue disease as well. No SLEDAI labs as there is low suspicion of exacerbation as of now. 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   - Continue home Plaquenil, Mycophenolate, titrating down steroids    Hyponatremia- (present on admission)  Assessment & Plan  Improving to 132, hypovolemic on admission, likely improving with enteral nutrition.   -Continue to monitor    Chronic respiratory failure with hypoxia (HCC)- (present on admission)  Assessment & Plan  Hx of pulmonary HTN with unclear cause. Max RVSP was 35 mmHg, with ground glass opacities in  the past - ddx for pulmonology when evaluated in 2022 were lupus pneumonitis/MCTD vs. volume overload (L-heart failure). Home oxygen requirement 5LNC, and patient is currently on 3lt and euvolemic, which supports last mentioned. Continue attempting to wean while in hospital.   - Needs outpt pulm and had a pending RHC outpt   - O2 per protocol  - Monitor     Anemia associated with acute blood loss- (present on admission)  Assessment & Plan  Presents to the ED with hemoglobin 6.1, blood loss from AV fistula site at dialysis center, that was difficult to control, achieved on 6/19. Anemia also has a component of chronic inflammatory anemia (high ferritin), coagulopathy labs show elevated INR 1.34 with TEG findings largely normal with no indication for FFP or cryo. Required 1 pRBC. Improving, stable Hb, no active bleed.   - Not daily monitoring given stability  - No indication for iron supplementation  - Potential improvement with nutrition as above  - Continue EPO with dialysis     Pancytopenia (HCC)- (present on admission)  Assessment & Plan  Chronic, potential relation to SLE and malnutrition. Leukopenia fluctuates.  - Monitor   - See plan for anemia     HTN (hypertension)- (present on admission)  Assessment & Plan  Controlled. Starting GDMT slowly for HF. Discussed metoprolol allergy, mom states she does ot recall any reactions to it.   - Start ARB  - Start metoprolol succinate   - cont home clonidine patch, potentially can dc at home   - hydralazine prn    Arteriovenous fistula, acquired (HCC)- (present on admission)  Assessment & Plan  Admitted with profuse bleeding. Resolved.   -Bleeding recurred 6/19 Vascular surgery Dr. Emerson took patient for right fistulogram for treatment of central venous stenosis with venoplasty.  -Outpatient followup with vascular surgeon  3-6 months  -Fistula cleared for dialysis    ACP (advance care planning)  Assessment & Plan  06/25/23: Lengthy discussion with mother Chikis  regarding patient's prognosis and goals of care. Medical team has been very carolann with both patient and family regarding poor prognosis. Mother understands patient's poor prognosis. Discussed that patient has refused NG tube and when eventually placed had auto-removed, refusing PO meds, and not having much PO intake. Discussed what patient's wishes have been, and what they were prior to hospitalization. Discussed what hospice is, what it entails, and how this would be implemented at home. Discussed need for discontinuation of hemodialysis in order to participate in hospice care. Discussed no further hospital admissions. Discussed that pain and anxiety medications would continue to provide comfort. Conservative estimate of 1 month given regarding life expectancy, as hemodialysis would be discontinued, IV antibiotics discontinued, and anything that doesn't provide comfort would likely be discontinued. Patient's mother understands that that is a conservative estimate, and life expectancy may be much shorter. Patient's mother thinks that hospice may be the best for her, but wants to discuss further with family. Patient's sister Jaz was also spoken to over speaker phone regarding patient's prognosis, goals of care, and consideration of home hospice as noted above. Both mother and sister will discuss with family tonight and will make a decision tomorrow. Code status discussed as well, as patient currently DNAR, I Okay. Continue reassessing.           VTE prophylaxis: SCDs/TEDs and pharmacologic prophylaxis contraindicated due to bleeding risk    I have performed a physical exam and reviewed and updated ROS and Plan today (6/27/2023). In review of yesterday's note (6/26/2023), there are no changes except as documented above.

## 2023-06-27 NOTE — ASSESSMENT & PLAN NOTE
Lupus GN. On dialysis M/W/F. AV fistula complicated by the above, resolving. No known renal osteodystrophy.   - Nephrology on board  - Continued HD while inpt

## 2023-06-27 NOTE — FACE TO FACE
Face to Face Supporting Documentation - Home Health    The encounter with this patient was in whole or in part the primary reason for home health admission.    Date of encounter:   Patient:                    MRN:                       YOB: 2021  Lily Nicole  [id number]  1995     Home health to see patient for:  Skilled Nursing care for assessment, interventions & education, Physical Therapy evaluation and treatment, Occupational therapy evaluation and treatment, and Speech Language Pathology evaluation and treatment    Skilled need for:  Exacerbation of Chronic Disease State Multifactorial severe malnutrition and acute on chronic anemia, causing severe weakness.     Skilled nursing interventions to include:  Comment: Enteral nutrition.     Homebound status evidenced by:  Need the aid of supportive devices such as crutches, canes, wheelchairs or walkers, Require the use of special transportation, or Needs the assistance of another person in order to leave the home. Leaving home requires a considerable and taxing effort. There is a normal inability to leave the home.    Community Physician to provide follow up care: Ella Matthew M.D.     Optional Interventions? No      I certify the face to face encounter for this home health care referral meets the CMS requirements and the encounter/clinical assessment with the patient was, in whole, or in part, for the medical condition(s) listed above, which is the primary reason for home health care. Based on my clinical findings: the service(s) are medically necessary, support the need for home health care, and the homebound criteria are met.  I certify that this patient has had a face to face encounter by myself.  Antonieta Barton M.D. - NPI: [id number]

## 2023-06-28 PROBLEM — R78.81 BACTEREMIA OF UNDETERMINED ETIOLOGY: Status: RESOLVED | Noted: 2023-01-01 | Resolved: 2023-01-01

## 2023-06-28 PROBLEM — E87.8 DISORDER OF ELECTROLYTES: Status: RESOLVED | Noted: 2023-01-01 | Resolved: 2023-01-01

## 2023-06-28 NOTE — PROGRESS NOTES
Monitor summary  Rhythm:  SR  Ectopy: PVC (R)  HR:  72-87  .18/.08/.35  Per strip from monitor room

## 2023-06-28 NOTE — DISCHARGE PLANNING
"HTH/SCP TCN chart review completed. Collaborated with LEXIS Vera. Current discharge considerations are home with HH and family support. Note patient accepted with [facility]. Per MD, possible discharge 6/28/23.    Per Chart Review:  -6/26/2023 \" Iris feeding tube (43 inches, 10 [place]) placed at 74cm in right nare. \"  -Provider notes by Dr Antonieta Barton M.D. on 6/26/2023  6:29 AM indicated pt is medically cleared. Furthermore MD noted \" Per Dr. Fortune 06/24/23, patient is incapacitated to make medical or placement decisions as she does not verbalize her current medical condition.\"    TCN will continue to follow and collaborate with discharge planning team as additional post acute needs arise. Thank you.    Completed:  SLP w/ recs for post-acute placement 6/26/2023  Psychiatry eval 6/24/2023  OT order discontinued as patient continues to refuse and wants to discharge home  Choice obtained: HH 6/25/2023  [facility] confirmed 6/15/2023  Palliative Medicine consulted  [facility] referral ( pt declined [facility] services 6/15/2023  "

## 2023-06-28 NOTE — PROCEDURES
Pt with ESRD, presented with failure to thrive.  Pt is doing about the same.  Seen and examined while getting HD.

## 2023-06-28 NOTE — PROGRESS NOTES
[facility] Services     Dialysis treatment started at 0908 and completed at 1210. Nephrologist Dr. Najjar notified of treatment start.     NET UF: 2000 mL     Patient is A&Ox4, no pain and discomfort reported. VS within normal limits. Right UA AVF has Bruitt and thrill and no signs and symptoms of infection. AVF cannulated and no access issues encountered. Lines are patent w/ good blood flow. Lab results and orders reviewed prior to treatment start.     Patient completed and tolerated dialysis treatment well w/o complications. VS stayed within normal limits. Left UA AVF Deaccessed, No bleeding noted after needle removal. Manual access pressure applied x 10mins. Patient and PCN instructed to monitor Right UA AVF site for bleeding and to re-enforce pressure dressing if needed.     1245 Report given to PCN SHANNAN Rock RN    See Dialysis treatment flow sheet for details.

## 2023-06-28 NOTE — PROGRESS NOTES
Telemetry Report:    Rhythm: Sinus Rhythm  Heart Rate: 89  Ectopy:    NH:.17 sec  QRS: 0.07 sec  QT: .38 sec                  Per telemetry room monitor

## 2023-06-28 NOTE — DISCHARGE PLANNING
Case Management Discharge Planning    Admission Date: 6/14/2023  GMLOS: 5.2  ALOS: 14    6-Clicks ADL Score: 15  6-Clicks Mobility Score: 9  PT and/or OT Eval ordered: Yes  Post-acute Referrals Ordered: No  Post-acute Choice Obtained: No  Has referral(s) been sent to post-acute provider:  No      Anticipated Discharge Dispo: Discharge Disposition: D/T to home under HHA care in anticipation of covered skilled care (06)    DME Needed: No    Action(s) Taken: OTHER    Patient discharging home with home health: Kerri.     LSW spoke to patient's mom and confirmed discharge address home. LSW to request gurney transport with T at 1530 after patient's dialysis. Transport request faxed to [facility].    @1245: Transport home confirmed for 1530. Faxed approved services to Tamra with [facility].     Escalations Completed: None    Medically Clear: Yes    Next Steps: LSW to follow up with patient and medical team regarding discharge needs and barriers.     Barriers to Discharge: Transportation

## 2023-06-28 NOTE — PROGRESS NOTES
Patient Aox4, denies any pain or discomfort at this time. Nurse reviewed and educated patient on discharge instructions and medications. Nurse answered all questions, discharge paperwork handed over to patient. Meds 2 bed handed over only med missing is Cellcept. Mom aware to pick it up tomorrow am from [facility]. All personal belongings secured by patient. Patient discharged to home. Transport arrives patient taken via stretcher.

## 2023-06-28 NOTE — DIETARY
Nu Byrd @ [facility] states she went out sat to admit pt to home health and the pt and daughter Kathy didn't feel they needed nursing services, just PT and OT Nutrition Services Brief Update:    Problem: Nutritional:  Goal: Nutrition support tolerated and meeting greater than 85% of estimated needs  Outcome: Goal Met    Per RN, tube feeding Fibersource HN @ goal rate of 50 ml/hr.    RD following.

## 2023-06-28 NOTE — DISCHARGE PLANNING
DC Transport Scheduled    Received request at: 6/28/2023 at 1208    Transport Company Scheduled:  [employer]    Scheduled Date: 6/28/2023  Scheduled Time: 1530    Destination: Home [facility] [place] [address]    Notified care team of scheduled transport via Voalte.     If there are any changes needed to the DC transportation scheduled, please contact [facility] at ext. [phone] between the hours of [phone] Mon-Fri. If outside those hours, contact the ED Case Manager at ext. [phone].

## 2023-06-28 NOTE — DISCHARGE SUMMARY
[facility] Discharge Summary    Attending: Dr. Harrison   Senior Resident: Dr. Deras  Intern:  Dr. Barton  Contact Number: [phone]    CHIEF COMPLAINT ON ADMISSION  Chief Complaint   Patient presents with    Bleeding/Bruising     Pt BIB [facility] from [facility]. Pt was having dialysis today, after removing needle from site, bleeding continued for approx 45 min. States this happened Friday as well and required a few stitches. Per EMS, bleeding was controlled by the time they arrived, no active bleeding noted at this time.        Reason for Admission  ems     Admission Date  6/14/2023    CODE STATUS  DNAR, I OK    HPI & HOSPITAL COURSE  Lily Nicole is a 27 y.o. female with significant medical history of SLE glomerulonephritis syndrome with  ESRD on hemodialysis (on plaquenil, mycophenolate, prednisone - on list for transplant at [facility]) +/- Mixed Connective Tissue Disease, malnutrition, hypoglycemia (complicated by seizures at some point), hypertension, constipation with chronic opioid use, migraines, Streptococcus pneumoniae bacteremia with suspected endocarditis and septic joints in 11/22 complicated by VT arrest, pulmonary hypertension on 5LNC (unclear cause, possible lupus pneumonitis - pending RHC), who presented 6/14/2023 with acute blood loss anemia from her AV fistula (present for about 9 years), Hb at the dialysis center was 6.1. Patient transfused 1 uPRBC irradiated with dialysis and benadryl 50mg IV due to history of shortness of breath associated with transfusions.    Anemia associated with acute blood loss- (present on admission)  Improved after AV fistula procedure on 6/19 as below. Anemia also has a component of chronic inflammatory anemia (high ferritin). INR 1.34 with TEG findings largely normal with no indication for FFP or cryo. Required 1 pRBC. Improved, stable Hb at 8-9, no active bleed.   - No indication for iron supplementation  - Potential improvement with  nutrition as below  - Continue EPO with dialysis     Arteriovenous fistula, acquired (HCC)- (present on admission)  Right upper extremity arteriovenous fistula bleeding   secondary to venous outflow stenosis. Underwent Right upper extremity dialysis fistulogram and central venogram and venoplasty of central venous stenosis 6/19. AV fistula was not pulsatile after.  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 Patient is cachectic, has low prealbumin and albumin, low marcelina and Zn. Started on tube feeding (ND tube) and tolerating well.   - ND tube nutrition at goal   - Going with home health for tube feedings and dietitian follow-up   -Multivitamin, thiamine and Zinc daily   -Boost with meals  -Psychology and psychiatry consulted: Lexapro 20 mg, Zyprexa at bedtime. Ativan PRN for anxiety.  - Monitor weight with home health    Bacteremia of undetermined etiology  Febrile episodes, 6/20 blood cultures grew pansensitive E. coli, unknown etiology for infection. No skin/soft tissue infection, CT abd/pelvis without evidence of abscess or lesions, no joint compromise, suspected source was aspiration pneumonia. Repeated blood cultures 06/22 remained negative. Afebrile.   -Zosyn started 6/21, transitioned to cefazolin 6/26-6/28. Completed therapy.     Heart failure with reduced ejection fraction (HCC)  Echo 6/23/23 EF 45%, moderate concentric LVH, global hypokinesis, G1-2 diastolic dysfunction, mild AI, MR, TR. No changes in echo from prior (after bacteremia and endocarditis episode). Likely associated to malnutrition, beriberi is a  possibility. Patient is euvolemic, O2 requirements decreasing.   - Started losartan 25 mg for guideline-directed therapy  - Started low dose carvedilol twice a day for guideline-directed therapy (can be crushed/ succinate can't be)  - Spironolactone and SLGT2i not indicated in ESRD  - Renewed referral to cardiology outpatient     Chronic respiratory failure with hypoxia (HCC)- (present on admission)  Patient has history of pulmonary hypertension (per echo, as she never got right heart catheterization done) with unclear cause. Maximum RVSP was 35 mmHg, with ground glass opacities in the past - diagnostic differentials for pulmonology in 2022 were lupus pneumonitis/connective tissue disease vs. volume overload (left-heart failure). Home oxygen requirement was 5 lt but here we weaned to 3 lt, which supports last mentioned. Continue attempting to wean with home health now that nutritional status may improve and patient is euvolemic.   - Renewed outpatient pulmonology referral for pending right heart catheterization   - O2 per protocol at home with      Systemic lupus erythematosus with organ system involvement (HCC)- (present on admission)  Per [facility] files possible component of connective tissue disease as well. No [facility] labs done as there is low suspicion of exacerbation as of now. ESRD/lupus nephritis likely at baseline, joint pain at baseline.   - Continue home Plaquenil and Prednisone 5 mg a day   - Mycophenolate changed to oral suspension for NG tube, mom to pick it up at [facility] pharmacy tomorrow 6/29    On 06/25 the team had a lengthy discussion with mom about goals of care, as patient did not desire to participate. Overall poor prognosis with end-stage renal disease, coronary disease, chronic hypoxia, and malnutrition was discussed. Code status was changed to DNAR, I OK. Hospice was discussed and services explained as patient was refusing feeding tube placement and had it auto-removed. Later on 6/26,  patient and mom agreeable for NG tube and they are hopeful that that will increase her nutritional status to be considered for transplant again if rheumatology conserves that plan moving forward. We continuously explained that she needs close follow up with cardiology and pulmonology with the same goal. Mom explained that patient normally takes care of her medical care and appointments, but given some lack of follow up, she was advised to involve more in this matters.     Therefore, she is discharged in fair and stable condition to home with organized home healthcare and close outpatient follow-up.    The patient met 2-midnight criteria for an inpatient stay at the time of discharge.    Discharge Date  6/28/2023    Physical Exam on Day of Discharge  Constitutional:       General: She is not in acute distress.     Appearance: She is ill-appearing. She is not toxic-appearing or diaphoretic.      Comments: Cachectic with temporal wasting, fatigued.     HENT:      Head: Normocephalic and atraumatic.      Right Ear: External ear normal.      Left Ear: External ear normal.      Nose: Nose normal.      Mouth/Throat:      Mouth: Mucous membranes are dry.   Eyes:      General: No scleral icterus.        Right eye: No discharge.         Left eye: No discharge.      Extraocular Movements: Extraocular movements intact.      Conjunctiva/sclera: Conjunctivae normal.   Cardiovascular:      Rate and Rhythm: Normal rate and regular rhythm.      Pulses: Normal pulses.      Heart sounds: Normal heart sounds. No murmur heard.  Pulmonary:      Effort: Pulmonary effort is normal. No respiratory distress.      Breath sounds: Normal breath sounds.   Abdominal:      General: Abdomen is flat. There is no distension.      Palpations: Abdomen is soft.      Tenderness: There is no abdominal tenderness.   Musculoskeletal:         General: Normal range of motion.      Cervical back: Normal range of motion.      Right lower leg: No edema.       "Left lower leg: No edema.      Comments: Right arm with a/v fistula without bleeding, multiple scars, nonpulsatile. Diffuse muscle and subcutaneous fat loss.    Skin:     General: Skin is warm and dry.      Coloration: Skin is pale.      Comments: Very dry skin and hair.    Neurological:      General: No focal deficit present.      Mental Status: She is alert and oriented to person, place, and time.      Motor: Weakness present.      Comments: Hypotonic, atrophic muscles.   Psychiatric:         Mood and Affect: Mood normal.         Behavior: Behavior normal.     FOLLOW UP ITEMS POST DISCHARGE  Anemia  Pancytopenia  Malnutrition - tube feedings  AV fistula integrity  ESRD and SLE  Pulmonary hypertension - chronic oxygen   Cardiomyopathy    DISCHARGE DIAGNOSES  Principal Problem:    Severe protein-calorie malnutrition (HCC) (POA: Yes)  Active Problems:    ESRD (end stage renal disease) (HCC) (POA: Yes)    Arteriovenous fistula, acquired (HCC) (POA: Yes)    HTN (hypertension) (POA: Yes)      Overview: \"Controlled with medication\"    Pancytopenia (HCC) (POA: Yes)    Anemia associated with acute blood loss (POA: Yes)    Chronic respiratory failure with hypoxia (HCC) (POA: Yes)    Hyponatremia (POA: Yes)    Systemic lupus erythematosus with organ system involvement (HCC) (POA: Yes)    Subclinical hypothyroidism (POA: Unknown)    Heart failure with reduced ejection fraction (HCC) (POA: Unknown)    Vitamin A deficiency (POA: Unknown)    Copper deficiency (POA: Unknown)    ACP (advance care planning) (POA: Unknown)  Resolved Problems:    Metabolic alkalosis (POA: Unknown)    Disorder of electrolytes (POA: Unknown)    Fe deficiency anemia (POA: Unknown)    Fever (POA: Unknown)    Bacteremia of undetermined etiology (POA: Unknown)      FOLLOW UP  No future appointments.  No follow-up provider specified.    MEDICATIONS ON DISCHARGE     Medication List        START taking these medications        Instructions   carvedilol 3.125 " MG Tabs  Commonly known as: COREG   Take 1 Tablet by Enteral Tube route 2 times a day with meals.  Dose: 3.125 mg     escitalopram 10 MG Tabs  Commonly known as: Lexapro   Take 1 Tablet by Enteral Tube route every day.  Dose: 10 mg     folic acid 1 MG Tabs  Commonly known as: FOLVITE   Take 1 Tablet by Enteral Tube route every day.  Dose: 1 mg     lansoprazole 30 MG Tbdd  Start taking on: June 29, 2023  Commonly known as: Prevacid   Take 1 Tablet by Enteral Tube route every day.  Dose: 30 mg     LORazepam 0.5 MG Tabs  Commonly known as: ATIVAN   Take 1 Tablet by Enteral Tube route every 8 hours as needed for Anxiety for up to 30 days.  Dose: 0.5 mg     losartan 25 MG Tabs  Start taking on: June 29, 2023  Commonly known as: COZAAR   Take 1 Tablet by Enteral Tube route every day.  Dose: 25 mg     mycophenolate 200 MG/ML suspension  Commonly known as: CELLCEPT   Take 2.5 mL by Enteral Tube route every morning for 30 days.  Dose: 500 mg     OLANZapine 2.5 MG Tabs  Commonly known as: ZYPREXA   Take 1 Tablet by Enteral Tube route every evening.  Dose: 2.5 mg     omeprazole 40 MG delayed-release capsule  Commonly known as: PRILOSEC   Take 1 Capsule by Enteral Tube route every day.  Dose: 40 mg     ondansetron 4 MG Tbdp  Commonly known as: ZOFRAN ODT   Take 1 Tablet by Enteral Tube route every four hours as needed for Nausea/Vomiting (give PO if no IV route available).  Dose: 4 mg     Therapeutic-M Tabs  Generic drug: therapeutic multivitamin-minerals   Take 1 Tablet by Enteral Tube route every day.  Dose: 1 Tablet     thiamine 100 MG tablet  Start taking on: June 29, 2023  Commonly known as: THIAMINE   Take 1 Tablet by Enteral Tube route every day.  Dose: 100 mg     Zinc 220 (50 Zn) MG Caps   Take 1 Capsule by Enteral Tube route every day.  Dose: 220 mg            CHANGE how you take these medications        Instructions   cloNIDine 0.2 MG/24HR Ptwk patch  What changed: See the new instructions.  Commonly known as:  CATAPRES   APPLY 1 PATCH TOPICALLY ONCE A WEEK            CONTINUE taking these medications        Instructions   epoetin roque 64119 UNIT/ML Soln  Commonly known as: EPOGEN/PROCRIT   Inject  under the skin.     HYDROmorphone 2 MG Tabs  Commonly known as: DILAUDID   Take 2 mg by mouth every 8 hours as needed for Severe Pain.  Dose: 2 mg     hydroxychloroquine 200 MG Tabs  Commonly known as: Plaquenil   Take 200 mg by mouth every morning.  Dose: 200 mg     predniSONE 5 MG Tabs  Commonly known as: DELTASONE   Take 5 mg by mouth every day.  Dose: 5 mg            STOP taking these medications      mycophenolate sodium 360 MG Tbec tablet  Commonly known as: MYFORTIC     pantoprazole 40 MG Tbec  Commonly known as: PROTONIX              Allergies  Allergies   Allergen Reactions    Cephalexin Rash     Nausea and rash   Hives  .    Clindamycin Rash     Nausea and rash     Hive  .    Hydrocodone Rash and Nausea     Rash      Maxipime [Cefepime] Itching    Methylprednisolone Unspecified     Anxious      Tolerates prednisone     Metoprolol Rash and Nausea     Nausea and rash     Tolerates antenalol          Diagnostic X-Ray Materials Itching     Per patient's mother    Furosemide      Other reaction(s): Unknown-Explain in Comments  Unable to obtain at this time    Hydroxyzine Hcl Rash    Insulin      Other reaction(s): Other-Reaction in Comments  Patient is very sensitive to insulin administration, especially when treating hyperkalemia.  Has a hx of blood glucose 12.  Closely monitor for severe hypoglycemia that could precipitate seizures. If it's required, then give less insulin/more dextrose to prevent such hypoglycemic episodes.    9/25/2022   Verified by Dr. Kelsie Emerson (endocrinoligy)    Compazine Anxiety    Fentanyl And Related Anxiety    Metoclopramide Anxiety    Tape Rash     Paper tape is ok       DIET  Orders Placed This Encounter   Procedures    Diet Order Diet: Regular; Fluid modifications: (optional): 1000 ml  Fluid Restriction     Standing Status:   Standing     Number of Occurrences:   1     Order Specific Question:   Diet:     Answer:   Regular [1]     Order Specific Question:   Fluid modifications: (optional)     Answer:   1000 ml Fluid Restriction [7]    Diet: Diet Tube Feed; Formula: Fibersource HN; Goal Rate (mL/Hour): 50; Duration: 24 HR; Tube Feed Time Unit: Hours/Day     Start at 25mL/hr. Do not advance until dietitian consult completed.     Standing Status:   Standing     Number of Occurrences:   1     Order Specific Question:   Diet     Answer:   Diet Tube Feed [35]     Order Specific Question:   Formula:     Answer:   Fibersource HN     Order Specific Question:   Goal Rate (mL/Hour)     Answer:   50     Order Specific Question:   Route     Answer:   NG     Order Specific Question:   Duration     Answer:   24 HR     Order Specific Question:   Tube Feed Time Unit     Answer:   Hours/Day       ACTIVITY  As tolerated.  Weight bearing as tolerated    CONSULTATIONS  Gastroenterology  Psychology  Psychiatry  Vascular surgery  Nephrology   Dietitian     PROCEDURES  Underwent Right upper extremity dialysis fistulogram and central venogram and venoplasty of central venous stenosis 6/19/2023.     LABORATORY  Lab Results   Component Value Date    SODIUM 131 (L) 06/28/2023    POTASSIUM 4.1 06/28/2023    CHLORIDE 98 06/28/2023    CO2 25 06/28/2023    GLUCOSE 103 (H) 06/28/2023    BUN 25 (H) 06/28/2023    CREATININE 3.64 (H) 06/28/2023        Lab Results   Component Value Date    WBC 2.6 (L) 06/27/2023    HEMOGLOBIN 8.4 (L) 06/27/2023    HEMATOCRIT 28.6 (L) 06/27/2023    PLATELETCT 205 06/27/2023        Total time of the discharge process exceeds 56 minutes.

## 2023-06-28 NOTE — CARE PLAN
The patient is Watcher - Medium risk of patient condition declining or worsening    Problem: Pain - Standard  Goal: Alleviation of pain or a reduction in pain to the patient’s comfort goal  Outcome: Progressing  Note: Patients pain being managed properly by current pain regiment.     Problem: Nutrition  Goal: Patient's nutritional and fluid intake will be adequate or improve  Outcome: Progressing  Note: Patients tube feed at goal rate of 50ml/hr of Fibersource.     Shift Goals  Clinical Goals: Monitor nausea/Pain management  Patient Goals: Pain Managament  Family Goals: FT placement    Progress made toward(s) clinical / shift goals: Patient tolerating goal rate of tube feed 50ml/hr, and patients pain is being controlled with current pain regiment.

## 2023-06-29 NOTE — ED NOTES
Med rec completed per patient's mother at bedside and historical med rec on 6/14/2023.  Allergies reviewed with mother.  Patient's preferred pharmacy: [facility] on [address].    Patient was admitted at [facility] 6/14/2023 - 6/28/2023. Patient's mother states patient has not had ANY medications at home since she was discharged; carvedilol, escitalopram, folic acid, lorazepam, losartan, olanzapine, ondansetron, multivitamin-minerals, thiamine, and zinc sulfate are all new prescriptions which patient has not started at home. Upon discharge, patient was switched from pantoprazole to omeprazole and from mycophenolate tablets to mycophenolate suspension (however, the mycophenolate suspension WAS NOT dispensed via [facility] with patient's other new prescriptions).  Times of last outpatient doses for hydroxychloroquine and prednisone updated to reflect historical med rec on 6/14/2023. Patient has on a clonidine patch that was applied while inpatient at [facility] on 6/26/2023.

## 2023-06-29 NOTE — ED TRIAGE NOTES
Chief Complaint   Patient presents with    Low Blood Sugar     BIBA for hypoglycemia (bg 55). Pt d/c'd yesterday s/p feeding tube insertion. Per EMS, feeding tube supplies were not delivered by the company that was supposed to deliver them. Pt c/o dizziness. Pt on 8L n/c at baseline.  AxOx4. Pt mother on her way.     BP (!) 144/106   Pulse 85   Temp 36.3 °C (97.3 °F) (Temporal)   Resp 19   Wt 43.5 kg (95 lb 14.4 oz)   SpO2 100%   BMI 15.96 kg/m²

## 2023-06-29 NOTE — ED NOTES
Hourly rounding complete. Pt resting on stretcher. VSS on 8L . Respirations even and non-labored. No s/sx of distress noted. Call bell within reach, side rails up. No complaints at this time.

## 2023-06-29 NOTE — DISCHARGE PLANNING
In review of the services arranged prior to discharge on 6/28, it is noted that home health orders were placed but there were not orders for tube feeds. Provided education to resident regarding the fact that home health does not provide tube feeds and separate orders must be placed for infusion services.    Resident came into  office and requested this LSW follow up with [facility] nurse, Elizabeth [phone], stating Elizabeth reported patient had no oxygen at home, feeds, dialysis, etc.    LSW made call to Elizabeth. LSW confirmed patient was discharged with oxygen: Preferred. LSW also confirmed patient is set up with dialysis MWF at [facility]. LSW also confirmed [facility] received face to face and home health referral and that they would be providing services to patient. Per Elizabeth, they are.    LSW reached out to resident and TCN, Alexander, as Elizabeth reports she needs an order from the doctor for the NG Tube feedings. Resident, TCN TY, and LSW placed phone call to Elizabeth. Spoke to her over the phone in CM office. Elizabeth let resident know what orders she needed from him. Per Alexander, referral should be sent to [facility].    LSW requested assistance from leaderDenise. ELIUD Walker, resident, and attending filled out [facility] order form for patient in  office.     LSW called [facility] and received fax number: [phone]. LSW informed [facility] this referral is urgent and to please call this LSW with updates as soon as possible.    LSW received multiple not delivered notification from fax.    LSW called [facility]. Spoke to Araceli. AKILW to email Araceli referral: [email or web address] AKILW emailed referral @[phone].     @[phone]: Call to [facility] [phone]. Per Araceli, they have been unable to reach patient. They are wondering if patient has home health, any formula, and if she knows how to administer it. KOLBY let Araceli know patient has [facility], does not have any formula, and  [facility] nurse Elizabeth reported they could provide teachings. Araceli reports she will forward information to Viv who is working with their dietician.     @1527: KOLBY sent voalte message to Dr. Harrison and resident Dr. Deras. LSW received call from [facility] inquiring what to do if patient does not receive nutrition today. This is not this W's area of expertise. LSW requesting Dr. Harrison or resident follow up with [facility] and provide them with recommendations.     @1543: LSW placed call to [facility]. Per Kerri, Dr. Harrison did call them and followed up with recommendations. Per Kerri, patient is back in the ER following symptoms of hyperglycemia. [facility] let Dr. Harrison know this and he is aware.     @1558: Call to Tessa JARRETT in ED. No answer. Call to MICHELLE IRENE In ED. Explained current situation and referral with [facility]. Per Francie, she has already called [facility] and will follow up with them regarding formula delivery.

## 2023-06-29 NOTE — ED PROVIDER NOTES
ED Provider Note    CHIEF COMPLAINT  Chief Complaint   Patient presents with    Low Blood Sugar     BIBA for hypoglycemia (bg 55). Pt d/c'd yesterday s/p feeding tube insertion. Per EMS, feeding tube supplies were not delivered by the company that was supposed to deliver them. Pt c/o dizziness. Pt on 8L n/c at baseline.  AxOx4. Pt mother on her way.       EXTERNAL RECORDS REVIEWED  inpatient notes: Patient was admitted to the hospital and discharged yesterday after having a feeding tube inserted for malnutrition    HPI/ROS  LIMITATION TO HISTORY   Select: Behavior  OUTSIDE HISTORIAN(S):  Parent mother who states that she called the place that was supposed to send the supplies and they were not ready and did not answer the phone    Lily JOJO Nicole is a 27 y.o. female who presents Inpatient Notes the patient was just discharged from the hospital yesterday after having a feeding tube insertion.  She is very malnourished and the feeding tube was placed to help increase her nutrition.  The EMS supplies for the feeding tube were not delivered by the company that was supposed to deliver them and so she did not receive them.  She was hypoglycemic at home with a blood glucose of 55 and feeling very weak.  She is complaining of diffuse pain and is requesting pain medication.    PAST MEDICAL HISTORY   has a past medical history of Anemia (01/17/2018), AVF (arteriovenous fistula) ([facility]), Chest pain, Chest tightness, Daytime sleepiness, Dialysis patient ([facility]), Difficulty breathing, ESRD (end stage renal disease) on dialysis ([facility]) (01/17/2018), Gasping for breath, Heart burn, Hypertension (01/17/2018), Indigestion, Lupus (HCC), Migraines (01/17/2018), Painful breathing, Palpitations, Seizure ([facility]) (2013), Shortness of breath, Swelling of lower extremity, and Wheezing.    SURGICAL HISTORY   has a past surgical history that includes ronak by laparoscopy (4/5/2010); av fistula creation (Right); angioplasty (01/17/2018); other;  other; gastroscopy-endo (12/9/2019); gastroscopy (N/A, 5/30/2020); gastroscopy-endo (9/18/2020); upper gi endoscopy,ctrl bleed (11/12/2020); upper gi endoscopy,biopsy (11/12/2020); gastroscopy-endo (11/12/2020); colonoscopy,diagnostic (1/8/2021); upper gi endoscopy,diagnosis (1/8/2021); upper gi endoscopy,ctrl bleed (1/8/2021); upper gi endoscopy,diagnosis (3/5/2021); upper gi endoscopy,diagnosis (N/A, 3/19/2021); upper gi endoscopy,ctrl bleed (3/19/2021); bronchoscopy,diagnostic (Bilateral, 5/13/2021); upper gi endoscopy,diagnosis (N/A, 8/26/2022); and upper gi endoscopy,ctrl bleed (N/A, 8/26/2022).    FAMILY HISTORY  Family History   Problem Relation Age of Onset    Diabetes Paternal Grandmother        SOCIAL HISTORY  Social History     Tobacco Use    Smoking status: Never    Smokeless tobacco: Never   Vaping Use    Vaping Use: Never used   Substance and Sexual Activity    Alcohol use: No    Drug use: No    Sexual activity: Not on file       CURRENT MEDICATIONS  Home Medications    **Home medications have not yet been reviewed for this encounter**         ALLERGIES  Allergies   Allergen Reactions    Cephalexin Rash     Nausea and rash   Hives  .    Clindamycin Rash     Nausea and rash     Hive  .    Hydrocodone Rash and Nausea     Rash      Maxipime [Cefepime] Itching    Methylprednisolone Unspecified     Anxious      Tolerates prednisone     Metoprolol Rash and Nausea     Nausea and rash     Tolerates antenalol          Diagnostic X-Ray Materials Itching     Per patient's mother    Furosemide      Other reaction(s): Unknown-Explain in Comments  Unable to obtain at this time    Hydroxyzine Hcl Rash    Insulin      Other reaction(s): Other-Reaction in Comments  Patient is very sensitive to insulin administration, especially when treating hyperkalemia.  Has a hx of blood glucose 12.  Closely monitor for severe hypoglycemia that could precipitate seizures. If it's required, then give less insulin/more dextrose to  prevent such hypoglycemic episodes.    9/25/2022   Verified by Dr. Kelsie Emerson (endocrinoligy)    Compazine Anxiety    Fentanyl And Related Anxiety    Metoclopramide Anxiety    Tape Rash     Paper tape is ok       PHYSICAL EXAM  VITAL SIGNS: BP (!) 144/106   Pulse 85   Temp 36.3 °C (97.3 °F) (Temporal)   Resp 19   Wt 43.5 kg (95 lb 14.4 oz)   SpO2 100%   BMI 15.96 kg/m²      Constitutional: Well developed, thin and cachectic no acute distress, Non-toxic appearance.   HEENT: Normocephalic, Atraumatic,  external ears normal, pharynx pink,  Mucous  Membranes moist, No rhinorrhea or mucosal edema NG tube in place in left nares  Eyes: PERRL, EOMI, Conjunctiva normal, No discharge.   Neck: Normal range of motion, No tenderness, Supple, No stridor.   Lymphatic: No lymphadenopathy    Cardiovascular: Regular Rate and Rhythm, No murmurs,  rubs, or gallops.   Thorax & Lungs: Lungs clear to auscultation bilaterally, No respiratory distress, No wheezes, rhales or rhonchi, No chest wall tenderness.   Abdomen: Bowel sounds normal, Soft, non tender, non distended,  No pulsatile masses., no rebound guarding or peritoneal signs.   Skin: Warm, Dry, No erythema, No rash,   Back:  No CVA tenderness,  No spinal tenderness, bony crepitance step offs or instability.   Extremities: Equal, intact distal pulses, No cyanosis, clubbing or edema,  No tenderness.   Musculoskeletal: Good range of motion in all major joints. No tenderness to palpation or major deformities noted.   Neurologic: Alert & oriented x 3, Cranial nerves II-XII intact, Equal strength and sensation upper and lower extremities bilaterally,  No focal deficits noted.   Psychiatric: Affect normal, Judgment normal, Mood normal. No suicidal or homicidal ideation      DIAGNOSTIC STUDIES / PROCEDURES  EKG  I have independently interpreted this EKG  See below    LABS  Results for orders placed or performed during the hospital encounter of 06/29/23   CBC WITH DIFFERENTIAL    Result Value Ref Range    WBC 3.9 (L) 4.8 - 10.8 K/uL    RBC 3.52 (L) 4.20 - 5.40 M/uL    Hemoglobin 8.5 (L) 12.0 - 16.0 g/dL    Hematocrit 28.5 (L) 37.0 - 47.0 %    MCV 81.0 (L) 81.4 - 97.8 fL    MCH 24.1 (L) 27.0 - 33.0 pg    MCHC 29.8 (L) 32.2 - 35.5 g/dL    RDW 71.8 (H) 35.9 - 50.0 fL    Platelet Count 260 164 - 446 K/uL    MPV 9.8 9.0 - 12.9 fL    Neutrophils-Polys 76.20 (H) 44.00 - 72.00 %    Lymphocytes 16.30 (L) 22.00 - 41.00 %    Monocytes 5.20 0.00 - 13.40 %    Eosinophils 0.50 0.00 - 6.90 %    Basophils 1.00 0.00 - 1.80 %    Immature Granulocytes 0.80 0.00 - 0.90 %    Nucleated RBC 0.50 (H) 0.00 - 0.20 /100 WBC    Neutrophils (Absolute) 2.95 1.82 - 7.42 K/uL    Lymphs (Absolute) 0.63 (L) 1.00 - 4.80 K/uL    Monos (Absolute) 0.20 0.00 - 0.85 K/uL    Eos (Absolute) 0.02 0.00 - 0.51 K/uL    Baso (Absolute) 0.04 0.00 - 0.12 K/uL    Immature Granulocytes (abs) 0.03 0.00 - 0.11 K/uL    NRBC (Absolute) 0.02 K/uL    Hypochromia 2+ (A)     Anisocytosis 1+     Macrocytosis 1+    COMP METABOLIC PANEL   Result Value Ref Range    Sodium 132 (L) 135 - 145 mmol/L    Potassium 3.8 3.6 - 5.5 mmol/L    Chloride 95 (L) 96 - 112 mmol/L    Co2 29 20 - 33 mmol/L    Anion Gap 8.0 7.0 - 16.0    Glucose 89 65 - 99 mg/dL    Bun 17 8 - 22 mg/dL    Creatinine 3.07 (H) 0.50 - 1.40 mg/dL    Calcium 8.5 8.5 - 10.5 mg/dL    AST(SGOT) 14 12 - 45 U/L    ALT(SGPT) 6 2 - 50 U/L    Alkaline Phosphatase 73 30 - 99 U/L    Total Bilirubin 0.4 0.1 - 1.5 mg/dL    Albumin 2.3 (L) 3.2 - 4.9 g/dL    Total Protein 8.3 (H) 6.0 - 8.2 g/dL    Globulin 6.0 (H) 1.9 - 3.5 g/dL    A-G Ratio 0.4 g/dL   LIPASE   Result Value Ref Range    Lipase 24 11 - 82 U/L   ESTIMATED GFR   Result Value Ref Range    GFR (CKD-EPI) 21 (A) >60 mL/min/1.73 m 2   CORRECTED CALCIUM   Result Value Ref Range    Correct Calcium 9.9 8.5 - 10.5 mg/dL   MORPHOLOGY   Result Value Ref Range    RBC Morphology Present     Target Cells 1+    PERIPHERAL SMEAR REVIEW   Result Value Ref  Range    Peripheral Smear Review see below    PLATELET ESTIMATE   Result Value Ref Range    Plt Estimation Normal    DIFFERENTIAL COMMENT   Result Value Ref Range    Comments-Diff see below    EKG (NOW)   Result Value Ref Range    Report       [facility] Emergency Dept.    Test Date:  2023  Pt Name:    CASE WOODSON            Department: ER  MRN:        [id number]                      Room:       RD 10  Gender:     Female                       Technician: [id number]  :        1995                   Requested By:MURIEL SANCHZE  Order #:    [id number]                    Reading MD: MURIEL SANCHEZ MD    Measurements  Intervals                                Axis  Rate:       87                           P:          69  GA:         164                          QRS:        -40  QRSD:       95                           T:          120  QT:         390  QTc:        470    Interpretive Statements  Sinus rhythm  Left anterior fascicular block  Nonspecific T abnrm, anterolateral leads  Compared to ECG 2023 14:58:15  Sinus tachycardia no longer present  Left ventricular hypertrophy no longer present  Early repolarization no longer present  ST (T wave) deviation no longer present  Possible ischemia no  longer present  Electronically Signed On 2023 16:29:56 PDT by MURIEL SANCHEZ MD     POCT glucose device results   Result Value Ref Range    POC Glucose, Blood 63 (L) 65 - 99 mg/dL         RADIOLOGY  I have independently interpreted the diagnostic imaging associated with this visit and am waiting the final reading from the radiologist.   My preliminary interpretation is as follows: none  Radiologist interpretation: none    COURSE & MEDICAL DECISION MAKING    ED Observation Status? no    INITIAL ASSESSMENT, COURSE AND PLAN  Care Narrative: This is a 27-year-old female who presents today after having a hypoglycemic episode at home.  She was just discharged from the hospital for NG placement  for nutrition but the nutritional supplement was never delivered.  She presents by ambulance here due to weakness from hypoglycemia.  Currently the patient feels generally weak and is asking for pain medication.        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      Her comprehensive metabolic panel is a sodium of 132 chloride 95 creatinine 3.07 which is about the patient's baseline.  Potassium is normal at 3.8.  Anion gap is normal at 8.  Her glucose is 89 after the D50.  CBC is white count 3.9 hemoglobin 8.5 which is chronic for the patient.  Platelet count normal at 260.  She has 76% neutrophils.    I gave the patient some morphine for pain for her request.    I have spoken with case management and we spoke also with  upstairs where she was discharged.  They did not think she would be getting any of her nutritional supplies for home use today.  She will be admitted to the hospital for nutrition as she has no reserve and does not have the outpatient supplies necessary to be successfully discharged.      I have discussed management of the patient with the following physicians and HONG's: Hospitalist Dr. Gibson who admit the patient until her her nutrition is available    Discussion of management with other [facility] or appropriate source(s): Case Management regarding her home nutrition supplies      Escalation of care considered, and ultimately not performed: none    Barriers to care at this time, including but not limited to:  none.     Decision tools and prescription drugs considered including, but not limited to:  none.     The  patient will be admitted in guarded condition    FINAL DIAGNOSIS  1. Hypoglycemia           Electronically signed by: Niecy Sandoval M.D., 6/29/2023 2:29 PM

## 2023-06-29 NOTE — PROGRESS NOTES
Patient was discharged 06/28/23 after repeated confirmation from case management, including during IDT rounds, that patient's home health needs were setup and provided with ability for safe discharge to home. This includes PT, OT, speech, dialysis, and enteral feeding.    Dr. Barton was contacted at approximately 1155 on 06/29/23/ by [facility] nurse regarding lack of oxygen and tube feed at home. Immediately discussed with case management; oxygen, PT, OT, speech, and dialysis needs were setup and verified, but enteral feeding was not setup; pump, supplies, or nutrition.    Myself and Dr. Harrison worked with case management regarding this, as unable to place orders in Epic for patients discharged greater than 2 hours prior. Paper documents for enteral feeding, pump, and supplies were completed by approximately 1325.

## 2023-06-29 NOTE — FACE TO FACE
Face to Face Note  -  Durable Medical Equipment    Kt Deras M.D. - NPI: [id number]  I certify that this patient is under my care and that they had a durable medical equipment(DME)face to face encounter by myself that meets the physician DME face-to-face encounter requirements with this patient on:    Date of encounter:   Patient:                    MRN:                       YOB: 2021  Lily Nicole  [id number]  1995     The encounter with the patient was in whole, or in part, for the following medical condition, which is the primary reason for durable medical equipment:  Other - Failure to thrive; multifactorial severe malnutrition and acute on chronic anemia, causing severe weakness    I certify that, based on my findings, the following durable medical equipment is medically necessary:    Enteral Feedings/Supplies/Pumps.    My Clinical findings support the need for the above equipment due to:  Hypoxia, Other - Severe protein-calorie malnutrition

## 2023-06-30 NOTE — DISCHARGE PLANNING
HTH/SCP TCN chart review completed. Collaborated with LEXIS, RAYSA, and MD prior to meeting with the pt. The most current review of medical record, knowledge of pt's PLOF and social support, LACE+ score of 67, no 6 clicks scores available (though pt is quite limited in mobility per review, etc). Pt and situation is well known to TCNs from prior admit.     Pt seen at bedside with mother present. Note that pt essentially does not engage with conversation though this appears to be baseline per review/prior experience. Re-Introduced TCN program. Provided education regarding post acute levels of care. Discussed HTH/SCP plan benefits (Meds to Beds, medical uber and [facility] transitional care). Mother verbalizes understanding.     Pt was recently dc'd to home with [facility] services. Appears that NG tube feeding supplies were not ordered at time of dc. [facility] was contacted post dc given concerns from CM/ALICIA and communication with MDs and TCNs and was to deliver/provide edcuation. However appears [facility] was not fully aware of situation/NG tube and is reporting to  currently that [facility] is unable to accomodate NG tube via  and will not be accepting back onto  services at this time*. Appears pt was readmitted prior to feeding tube supplies being delivered as well.     Per review, she is also not currently a candidate for PEG tube. APRN has initiated discussion re: medical and tube feeding concerns as well with mother. Per discussion via APRN, family may be more on board with more hospice/comfort type measures though this is still TBD and plan/GOC/POC still being determined. Would note both palliative and SLP consult are in place at this time.     No additional provider requests at this time and no choice obtained given somewhat complex dc planning situation as anticipated GOC/POC discussion with team and palliative will need to occur to assist with dc plan.  TCN will continue to follow and collaborate with  discharge planning team as additional post acute needs arise. Thank you.     Completed today:  Choice obtained: none; see above; needing GOC/POC discussion with family, medical team, palliative, etc  [facility] referral not sent; see above; awaiting GOC/POC     *noted in PM that NG tube was discontinued and pt/family are denying issues with oral intake at this time. Noted DPA followed up with [facility] and [facility] remains on service for HH again at time of dc if indicated; will monitor

## 2023-06-30 NOTE — ASSESSMENT & PLAN NOTE
-Continue chronic medications.  Stop plaquinil as it can cause hypoglycemia, no need to substitute per my conversation with pt outpatient rheumatologist     Follows up with outpatient rheumatologist.

## 2023-06-30 NOTE — PROGRESS NOTES
Pt transferred to unit by CDU RN to [facility] as a hallway bed. Patient to transfer into 5 after room is cleaned. All family and patient questions answered. Patient resting in bed in hallways in front of 725 calmly. Bed locked and in lowest position. Close monitoring in place.

## 2023-06-30 NOTE — ASSESSMENT & PLAN NOTE
"Nephrology consulting  Ongoing correction, hemodialysis  This is stable, improved from admission\"    Ordered repeat labs  "

## 2023-06-30 NOTE — PROGRESS NOTES
Attempted to give patient medication via NG tube. Patient states feeling very nauseous and unable to tolerate medications, hospitalist notified.

## 2023-06-30 NOTE — PROGRESS NOTES
Pt arrived to unit via aziza at 1729. Pt oriented to room, unit, and plan of care. Pt lethargic, responds to stimuli.  Mother at bedside; Awaiting hospitalist for admit orders; Pt resting no s/sx of distress; All questions answered at this time. Call light within reach; fall precautions in place.

## 2023-06-30 NOTE — CONSULTS
Inpatient Palliative Medicine Evaluation     Lily Nicole  27 y.o. female  [id number]    Location: [facility]  PCP: Ella Matthew M.D.  Referral Source: Sandra Gavin    Reason for palliative medicine consultation and/or visit: advance care planning       Assessment and Plan:     GOAL(S) OF CARE = Longevity, but living the way patient wants., NOT necessarily absolute longevity  AMENABLE to: dialysis, transfusions, medications, PEG if deemed eligible  NOT amenable to: NGT reinsertion    SYMPTOMS ETIOLOGY/CAUSES = fatigue, nausea, anxiety    PROGNOSIS = life-limited due to lupus and associated multi-organ failure  - NOT YET hospice-eligible = as patient still wants to continue dialysis    CODE STATUS = DNAR, I-OK  - No detailed discussion today.  Assumed from prior admission.  - Mom does express doubt whether or not patient would really want intubation when critical. ([facility] exploring a full DNAR DNI status later down the road.)  - Mom accepted patient's request to act as her decisional surrogate when situation is critical.      ADVANCE CARE PLANNING =   Reviewed clinical updates.  Clarified important goal = living without NGT.  Reviewed potential clinical outcomes with and without NGT use.  Identified mom as decisional surrogate when clinically critical.    PALLIATIVE CARE TEAM INTERVENTIONS =   Advance care planning.  2. Removed NGT and re-ordered clear liquid diet (advance as tolerated), with a clear goal (living without NGT, PEG permissive if qualifying)  3. Emotional support for patient and mother.      Summary:   Patient is a 28yo F with complicated medical history stemming from her lupus that has led to many co morbidities including ESRD on hemodialysis, malnutrition, migraines, pulmonary hypertension, and HFrEF.      Multiple readmissions due to bleeding, malnutritions, infections.  In a life-limited state with ongoing goal of continued  "longevity.  ---------------------------------------------------------------------------------------------------------------------------  Met with patient and her mother again in the hallway.  She was anxious, crying and adamant about removing her NGT.  She expressed a strong desire to eat normally through her mouth (mac & cheese, soda, coke, etc.) Given her prolonged readmissions, I had to clarify with her the risk of malnutrition leading to death, without NGT, as she was not deemed a good PEG candidate last time.    \"I don't want an NGT ever again,\" she told me.  \"If I die sooner without NGT, it will be OK with me, because this feels so gross and makes me anxious.\"  Mom could not help but sobbed briefly, and I pressed patient to confirm and reconfirmed again 1 hour later.  Indeed she remained consistent about her wish NOT to reapply an NGT.    RN was very responsive and removed NGT shortly after order placement.   Patient was visibly in much better mood and calmer during our later discussions.    Patient still wants to continue dialysis, as well as all conservative medical treatments.  She delegates to her mother to discuss \"life & death stuff\" because she feels anxious with these discussions, and her mother Chikis expressed full agreement.    Chikis and I already previously discussed about a DNAR, I-OK status.  We did not have much time to reclarify code status today due to pt being transported to dialysis.  However, Chikis did express to me privately how she doubts patient will want to be reintubated again in the future, because \"she hated it so much last year when intubated.\"    Otherwise patient seems to be clinical stable , all things considered, and is in better spirit after NGT removal.  She was also requesting some snacks and Mac & Cheese, which I was zoltan to find a couple and left in her gurney during dialysis.       Advance care planning:  The patient and/or legal decision maker has provided " voluntary consent to discuss advance care planning. We discussed code status.  DNAR, I-OK    Total time spent in ACP discussion 30 minutes, which is separate from the time spent completing the evaluation and management visit.     Thank you for allowing me the opportunity to participate in the care of Lily Nicole       I spent a total of 60 minutes reviewing medical records, direct face-to-face time with the patient and/or family, documentation and coordination of care. This is separate from the time spent on advance care planning, which is documented above.         DALILA OKEEFE DO (TIM)  [facility]   [address] [place]  [address]  P: [phone]  F: [phone]  C: [phone]

## 2023-06-30 NOTE — ASSESSMENT & PLAN NOTE
-Due to history of pulmonary hypertension.  At baseline 5 L oxygen.    -Continue respiratory support, RT protocol, supplemental oxygen.

## 2023-06-30 NOTE — PROGRESS NOTES
"Hospital Medicine Daily Progress Note    Date of Service  6/30/2023    Chief Complaint  Lily Nicole is a 27 y.o. female admitted 6/29/2023 with hypoglycemia    Hospital Course  Lily Nicole is a 27 y.o. female who presents to ED on 6/29/2023 for hypoglycemia and dizziness. 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 Her mother Chikis is her medical decision maker.    She had recent admission to [facility] on 6/14 for acute blood loss anemia from bleeding fistula and had complicated course with patient ultimately having NG tube placed for severe malnutrition. Dr. Fortune with psychiatry did advise during that admission that patient was incapacitated to make medical or placement decisions. Palliative care did follow along and hospice was discussed as an option for discharge, however family opted to have patient DC home on 6/28 with NG tube in place for tube feedings.     Apparently her feeding tube supplies were not delivered to her home and she now returns to the ED with glucose 55, diffuse pain, and weakness. In ED she is afebrile, on 7L O2 up from baseline 5L, reports \"pain everywhere\". She does state she does not want PEG tube. In ED she was treated with morphine for pain and boluses of 50% dextrose. She was admitted to hospitalist service for nutritional support while awaiting outpatient supplies to be delivered.     Interval Problem Update  6/30/2023: Pt not tolerating goal tube feed rates. She is on D10 continuous infusion and still having FSBS as low as 30, had several 25g D10 boluses overnight. She is quite obtunded on exam. She is not able to discharge today and will " instead transfer inpatient. She gets HD MWF, notified Dr. Najjar of her readmission. She has also been denied by [facility] due to feeding tube being NG, not PEG - anticipate she will be unable/inappropriate to DC again to her home with NG tube. Discussed her course and prognosis with her mother at length who requests a return to DNAR-I OK status as per prior hospitalization.  - Increase D10 to 75mL/hr, bolus PRN  - RD consulted  - PT, OT, SLP  - Palliative consulted, spoke with Dr. Leavitt - greatly appreciate assistance.  - Normally HD MWF - Dr. Najjar with nephrology consulted.   - GI/Surg not yet consulted for PEG, as patient not tolerating goal tube feed and stating she does not want one. Will revisit once clinically appropriate.  - Trend glucose, electrolytes  - She will be transferred to telemetry inpatient for refractory hypoglycemia requiring continuous intravenous intervention.     I have discussed this patient's plan of care and discharge plan at IDT rounds today with Case Management, Nursing, Nursing leadership, and other members of the IDT team.    Consultants/Specialty  nephrology and palliative care    Code Status  DNAR, I OK    Disposition  The patient is not medically cleared for discharge to home or a post-acute facility.    Anticipate discharge to: Unknown - hospice vs post-acute vs home  I have placed the appropriate orders for post-discharge needs.    Review of Systems  Review of Systems   Constitutional:  Positive for malaise/fatigue and weight loss. Negative for fever.   HENT:  Positive for sore throat. Negative for congestion and sinus pain.    Eyes:  Negative for blurred vision and double vision.   Respiratory:  Negative for cough, sputum production and wheezing.    Cardiovascular:  Negative for chest pain and leg swelling.   Gastrointestinal:  Positive for abdominal pain, nausea and vomiting.   Genitourinary:  Negative for dysuria and frequency.   Musculoskeletal:  Positive for back pain,  joint pain, myalgias and neck pain.   Neurological:  Positive for dizziness and weakness. Negative for focal weakness.   Psychiatric/Behavioral:  The patient is nervous/anxious.         Physical Exam  Temp:  [36.3 °C (97.3 °F)-36.7 °C (98 °F)] 36.4 °C (97.6 °F)  Pulse:  [] 72  Resp:  [16-20] 16  BP: (107-168)/() 168/96  SpO2:  [95 %-100 %] 95 %    Physical Exam  Vitals and nursing note reviewed.   Constitutional:       Appearance: She is cachectic. She is ill-appearing.   HENT:      Head: Normocephalic and atraumatic.      Nose:      Comments: Small bore NG tube, taped     Mouth/Throat:      Mouth: Mucous membranes are dry.   Eyes:      Pupils: Pupils are equal, round, and reactive to light.   Cardiovascular:      Rate and Rhythm: Normal rate and regular rhythm.      Pulses: Normal pulses.      Heart sounds: Murmur heard.      Comments: Right arm AV fistula, +b/t  Pulmonary:      Effort: Pulmonary effort is normal. No respiratory distress.      Breath sounds: Normal breath sounds. No wheezing.      Comments: O2 by NC  Abdominal:      General: Abdomen is flat.      Palpations: Abdomen is soft.      Tenderness: There is no abdominal tenderness. There is no guarding.   Musculoskeletal:         General: No swelling. Normal range of motion.      Cervical back: Normal range of motion and neck supple.      Right lower leg: No edema.      Left lower leg: No edema.   Skin:     General: Skin is dry.      Capillary Refill: Capillary refill takes less than 2 seconds.      Coloration: Skin is pale.   Neurological:      General: No focal deficit present.      Mental Status: Mental status is at baseline. She is lethargic.      Motor: Weakness and atrophy present.   Psychiatric:         Behavior: Behavior is slowed.         Fluids    Intake/Output Summary (Last 24 hours) at 6/30/2023 1635  Last data filed at 6/30/2023 1203  Gross per 24 hour   Intake 105 ml   Output --   Net 105 ml       Laboratory  Recent Labs      06/29/23  1434   WBC 3.9*   RBC 3.52*   HEMOGLOBIN 8.5*   HEMATOCRIT 28.5*   MCV 81.0*   MCH 24.1*   MCHC 29.8*   RDW 71.8*   PLATELETCT 260   MPV 9.8     Recent Labs     06/28/23  0223 06/29/23  1434   SODIUM 131* 132*   POTASSIUM 4.1 3.8   CHLORIDE 98 95*   CO2 25 29   GLUCOSE 103* 89   BUN 25* 17   CREATININE 3.64* 3.07*   CALCIUM 8.3* 8.5                   Imaging  DX-ABDOMEN FOR TUBE PLACEMENT   Final Result      Feeding tube tip at the descending duodenum.           Assessment/Plan  * Hypoglycemia- (present on admission)  Assessment & Plan  Brittle hypoglycemia, on continuous D10 + boluses  Did not have enteral supplies on DC - however likely cannot DC again home with NG tube  Will consider PEG once clinically warranted. Was not tolerating enteral feeding well today.  Hypoglycemic protocol  RD consult    Severe protein-calorie malnutrition (HCC)- (present on admission)  Assessment & Plan  Previously on enteral feeding by NG tube  She desires to remove NG tube.   Will consider removal after GOC discussion with palliative.  May need to consider re-evaluation for a G-tube if enteral feeding is still required.    Heart failure with reduced ejection fraction (HCC)- (present on admission)  Assessment & Plan  LVEF 45% on echo 6/18/23  Does not appear to be in fluid overload    Pulmonary hypertension (HCC)- (present on admission)  Assessment & Plan  5L O2 baseline. On supplemental oxygen 7L today.  Due to MCTD and LUPUS    Hyponatremia- (present on admission)  Assessment & Plan  Chronic  6/29 Na:132  Trend BMP    Chronic respiratory failure with hypoxia (HCC)- (present on admission)  Assessment & Plan  Hx of pulm htn, 5L at baseline  Continue supplemental O2    Subclinical hypothyroidism- (present on admission)  Assessment & Plan  TSH:9.130  Monitor outpatient    Systemic lupus erythematosus with organ system involvement (HCC)- (present on admission)  Assessment & Plan  On immunotherapy and to continue current  outpatient meds  Follow up with rheumatologist.    Anemia- (present on admission)  Assessment & Plan  Of chronic disease  Monitor    Immunosuppression (HCC)- (present on admission)  Assessment & Plan  DUe to immunosuppresive medication for lupus    Lupus (HCC)- (present on admission)  Assessment & Plan   Chronic, continue home meds    ESRD (end stage renal disease) ([facility])- (present on admission)  Assessment & Plan  On HD M,W,F  Nephrology following  Monitor labs    Previously she has intermittently gotten benadryl and/or ativan pre-dialysis. Will add for today's session.    Arteriovenous fistula, acquired (HCC)- (present on admission)  Assessment & Plan  For HD   +b/t, no bleeding         VTE prophylaxis: SCDs/TEDs    I have performed a physical exam and reviewed and updated ROS and Plan today (6/30/2023). In review of yesterday's note (6/29/2023), there are no changes except as documented above.    Time spent with patient, 30 minutes: 10 minutes assessment and history, 20 minutes education and counseling.

## 2023-06-30 NOTE — ASSESSMENT & PLAN NOTE
-LVEF 45% on echo 6/18/23  -Compensated, does not appear to be in fluid overload.  -Continue Coreg, Cozaar.

## 2023-06-30 NOTE — DIETARY
Nutrition Services: Day 0 of admit.  Lily Nicole is a 27 y.o. female with admitting DX of Hypoglycemia.    Consult received for TF.  Prior orders from RD on last admission remain appropriate: Start Fibersource HN @ 25 mL/hr, advance per protocol to goal rate of 50 mL/hr to provide 1440 kcal, 65 grams protein, and 972 mL of free water per day.  Of note, pt has a duodenal enteral tube, which will require a feeding pump for home nutrition support.  In addition to TF, recommend at least 500 ml free water per day for hydration (via tube or PO route).  CANDICE D/w APRN.

## 2023-06-30 NOTE — HOSPITAL COURSE
"Lily Nicole is a 27 y.o. female who presents to ED on 6/29/2023 for hypoglycemia and dizziness. She has an extensive medical history including SLE glomerulonephritis syndrome with ESRD on dialysis MWF (on list at [facility]?), possible mixed connective tissue disease, severe malnutrition, hypoglycemia with seizure, HTN, constipation, S.pneumoniae bacteremia and endocarditis with cardiac arrest 11/2022, E.coli bacteremia on 6/20/2023, pulmonary HTN on 5LNC baseline. Her mother Chikis is her medical decision maker.    She had recent admission to [facility] on 6/14 for acute blood loss anemia from bleeding fistula and had complicated course with patient ultimately having NG tube placed for severe malnutrition. Dr. Fortune with psychiatry did advise during that admission that patient was incapacitated to make medical or placement decisions. Palliative care did follow along and hospice was discussed as an option for discharge, however family opted to have patient DC home on 6/28 with NG tube in place for tube feedings.     Apparently her feeding tube supplies were not delivered to her home and she now returns to the ED with glucose 55, diffuse pain, and weakness. In ED she is afebrile, on 7L O2 up from baseline 5L, reports \"pain everywhere\". She does state she does not want PEG tube. In ED she was treated with morphine for pain and boluses of 50% dextrose. She was admitted to hospitalist service for nutritional support while awaiting outpatient supplies to be delivered.   "

## 2023-06-30 NOTE — PROGRESS NOTES
2338- Mary Mtz notified of fbg of 30. Order received for Dextrose 10% at 50ml/hr. Fluids started, will continue to monitor BS per protocol.

## 2023-06-30 NOTE — PROGRESS NOTES
Hypoglycemia Intervention    Hypoglycemia protocol intervention:  Blood glucose 113 at 0055.

## 2023-06-30 NOTE — PROGRESS NOTES
Hypoglycemia Intervention    Hypoglycemia protocol intervention:  Blood glucose 70 at 1832.  Intervention: 25 g IV dextrose per MAR   Repeat blood glucose 149 at 1955.  Intervention: Patient NPO, recheck blood glucose in 1 hour

## 2023-06-30 NOTE — ASSESSMENT & PLAN NOTE
"- Related to ESRD, autoimmune disorders, with component of acute blood loss anemia from bleeding AVF.  -Hemoglobin 6.4 1 unit of PRBC given 7/7/2023  - Continue to monitor.  Restrictive transfusion strategy.  Repeat CBC in a.m.\"    Recent Labs     07/11/23  0400 07/12/23  0300   HEMOGLOBIN 8.1* 7.5*   HEMATOCRIT 26.5* 25.2*   MCV 88.0 87.8   MCH 26.9* 26.1*   PLATELETCT 64* 58*   Dropping Hb monitor for bleeding  Ordered iron panel if not done  "

## 2023-06-30 NOTE — DISCHARGE PLANNING
Outpatient Dialysis Note     Home HD clinic:     [facility]   [address] [place] [id number]     Schedule: Mon, Wed, Fri   Time: 6:00 AM      Patient runs for 3 hrs.      Patient is followed by Dr. Trent.      Spoke with Mary at facility who confirmed patient information.   Forwarded records for review.     Xitlaly Reyes   Dialysis Coordinator / [facility]  Ph: [phone]

## 2023-06-30 NOTE — CARE PLAN
The patient is Watcher - Medium risk of patient condition declining or worsening    Shift Goals  Clinical Goals: Tolerate tube feedings, pain control, BS monitoring  Patient Goals: Comfort, pain relief  Family Goals: Comfort, rest  Problem: Nutrition  Goal: Enteral nutrition will be maintained or improve  Outcome: Progressing  Note: Tube placement verified, feedings initiated, patient tolerating feedings well. Goal rate has been achieve. Patient states still feeling nauseous. Will continue to monitor.   Target End Date: 7/7/23       Problem: Gastrointestinal Irritability  Goal: Nausea and vomiting will be absent or improve  Outcome: Not Progressing  Note: Patients tube feedings running at goal rate, patient states still feeling nauseous and not being able to tolerate enteral medications. Will continue to monitor.   Target End Date: 7/2/23

## 2023-06-30 NOTE — ASSESSMENT & PLAN NOTE
"This is a clinical diagnosis present upon admission.   She previously had been on enteral feeding by NG tube.  Does not want further NG tube feeding.  GI consulted for consideration of PEG placement once she is medically optimized, she declines for now we will need to reconsult if patient does decide for PEG tube  Monitor current oral intake, continue to encourage   Did have discussion with patient and family regarding my concern for her ongoing malnutrition, after discussion pt is agreeable to TPN  Continue TPN via femoral central access, (started 7/7)\"    Patient deciding on PEG tube  Per Palliative not ready for hospice  "

## 2023-06-30 NOTE — PROGRESS NOTES
Hypoglycemia Intervention    Hypoglycemia protocol intervention:  Blood glucose 65 at 0455.  Intervention: 25 g IV dextrose per MAR

## 2023-06-30 NOTE — THERAPY
Speech Language Therapy Contact Note    Patient Name: Lily Nicole  Age:  27 y.o., Sex:  female  Medical Record #: [id number]  Today's Date: 6/30/2023      Orders received for a CSE. Chart reviewed. Spoke with the patient and her mother, who both denied any issues/concerns re: swallowing. Pt reported good tolerance of her lunch consisting of chicken tenders and french fries. RN reported anticipated discontinuation of CSE orders per recent conversation with attending hospitalist.     Given patient/family denial of swallowing concerns, no evaluation was completed this date. Please reconsult if the pt's status changes or if any concerns for aspiration during oral intake arise. Thank you.

## 2023-06-30 NOTE — PROGRESS NOTES
Hypoglycemia Intervention    Hypoglycemia protocol intervention:  Blood glucose 47 at 0844.  Intervention: 25 g IV dextrose per MAR   Repeat blood glucose 111 at 0933.  Intervention: Patient NPO, recheck blood glucose in 1 hour   Additional interventions needed: BELINDA Lockwood; Navin Hawkins NP notified of the above at 1120.

## 2023-06-30 NOTE — PROGRESS NOTES
Hypoglycemia Intervention    Hypoglycemia protocol intervention:  Blood glucose 104 at 2055.

## 2023-06-30 NOTE — PROGRESS NOTES
Hypoglycemia Intervention    Hypoglycemia protocol intervention:  Blood glucose 30 at 1120.  Intervention: 25 g IV dextrose per MAR     Dr. Mtz notified of the above at 4209.

## 2023-06-30 NOTE — PROGRESS NOTES
4 Eyes Skin Assessment Completed by MICHELLE Russell and MICHELLE Tatum.    Head WDL  Ears WDL  Nose WDL  Mouth WDL  Neck WDL  Breast/Chest WDL  Shoulder Blades WDL  Spine WDL  (R) Arm/Elbow/Hand WDL  (L) Arm/Elbow/Hand WDL  Abdomen WDL  Groin WDL  Scrotum/Coccyx/Buttocks WDL  (R) Leg WDL, dressing on hip from hip surgery  (L) Leg WDL  (R) Heel/Foot/Toe WDL  (L) Heel/Foot/Toe WDL          Devices In Places Pulse Ox      Interventions In Place Pillows    Possible Skin Injury No    Pictures Uploaded Into Epic N/A  Wound Consult Placed N/A  RN Wound Prevention Protocol Ordered No

## 2023-06-30 NOTE — ASSESSMENT & PLAN NOTE
"-brittle hypoglycemia, likely from poor oral intake and inadequate liver gluconeogenesis .   She does not want to go back to NGT feeding.  She refused PEG placement   Wean off drip as much as possible,   Persistent hypoglycemic events with titration off D10, down to 28 overnight   IVF increased from 30 cc -> 50 cc >83 cc over last 24 hours   Continue to encourage oral intake  Continue Accu-Cheks and hypoglycemic protocol  Elevated C-peptide indicates significant amount of insulin secretion  Continues to have episodes of hypoglycemia requiring hypoglycemic protocol continues to be on D10  Continue blood sugar checks   Patient started on TPN  7/7/2023  Continues to have hypoglycemia despite D10 and TPN\"    Blood sugars improving   Address issues below  "

## 2023-06-30 NOTE — H&P
"Hospital Medicine History & Physical Note    Date of Service  6/29/2023    Primary Care Physician  Ella Matthew M.D.      Code Status  Full Code    Chief Complaint  Chief Complaint   Patient presents with    Low Blood Sugar     BIBA for hypoglycemia (bg 55). Pt d/c'd yesterday s/p feeding tube insertion. Per EMS, feeding tube supplies were not delivered by the company that was supposed to deliver them. Pt c/o dizziness. Pt on 8L n/c at baseline.  AxOx4. Pt mother on her way.       History of Presenting Illness  Lily Nicole is a 27 y.o. female with ESRD on HD, systemic lupus erythematosis, malnutrition with Cortrak for enteral feeding due to hx of vomiting, chronic weakness, seizure due to hypoglycemia, HTN, pulmonary hypertension on 5L home O2, immunocompromised due to medications.    She was just discharged to home with arrangement of home social needs but her enteral feeding supplies were not available to her.  Unfortunately she became hypoglycemic and was brought back to the hospital.  She states pain \"everywhere.\" She is biligual with English and [place] and was appropriate on my evaluation of her at bedside with her mom present. Patient states she doesn't want a PEG tube and that she was told she could not get a PEG \"not enough abdominal fat\".  I encouraged her for a PEG tube and alerted her we would need to evaluate if that indeed was true.     I discussed the plan of care with patient and family.    Review of Systems  Review of Systems   Constitutional:  Positive for malaise/fatigue and weight loss.   HENT:  Positive for sore throat.    Respiratory:  Negative for shortness of breath.    Cardiovascular:  Negative for chest pain, palpitations and leg swelling.   Gastrointestinal:  Positive for nausea. Negative for abdominal pain.   Genitourinary:  Negative for flank pain.   Musculoskeletal:  Positive for back pain, joint pain, myalgias and neck pain.   Neurological:  Negative for dizziness and " headaches.   Psychiatric/Behavioral:  Negative for substance abuse. The patient is not nervous/anxious.        Past Medical History   has a past medical history of Anemia (01/17/2018), AVF (arteriovenous fistula) ([facility]), Chest pain, Chest tightness, Daytime sleepiness, Dialysis patient ([facility]), Difficulty breathing, ESRD (end stage renal disease) on dialysis ([facility]) (01/17/2018), Gasping for breath, Heart burn, Hypertension (01/17/2018), Indigestion, Lupus ([facility]), Migraines (01/17/2018), Painful breathing, Palpitations, Seizure ([facility]) (2013), Shortness of breath, Swelling of lower extremity, and Wheezing.    Surgical History   has a past surgical history that includes ronak by laparoscopy (4/5/2010); av fistula creation (Right); angioplasty (01/17/2018); other; other; gastroscopy-endo (12/9/2019); gastroscopy (N/A, 5/30/2020); gastroscopy-endo (9/18/2020); pr upper gi endoscopy,ctrl bleed (11/12/2020); pr upper gi endoscopy,biopsy (11/12/2020); gastroscopy-endo (11/12/2020); pr colonoscopy,diagnostic (1/8/2021); pr upper gi endoscopy,diagnosis (1/8/2021); pr upper gi endoscopy,ctrl bleed (1/8/2021); pr upper gi endoscopy,diagnosis (3/5/2021); pr upper gi endoscopy,diagnosis (N/A, 3/19/2021); pr upper gi endoscopy,ctrl bleed (3/19/2021); pr bronchoscopy,diagnostic (Bilateral, 5/13/2021); pr upper gi endoscopy,diagnosis (N/A, 8/26/2022); and pr upper gi endoscopy,ctrl bleed (N/A, 8/26/2022).     Family History  family history includes Diabetes in her paternal grandmother.   Family history reviewed with patient. There is no family history that is pertinent to the chief complaint.     Social History   reports that she has never smoked. She has never used smokeless tobacco. She reports that she does not drink alcohol and does not use drugs.    Allergies  Allergies   Allergen Reactions    Cephalexin Rash     Nausea and rash   Hives  .    Clindamycin Rash     Nausea and rash     Hive  .    Hydrocodone Rash and Nausea     Rash       Maxipime [Cefepime] Itching    Methylprednisolone Unspecified     Anxious      Tolerates prednisone     Metoprolol Rash and Nausea     Nausea and rash     Tolerates antenalol          Diagnostic X-Ray Materials Itching     Per patient's mother    Furosemide      Other reaction(s): Unknown-Explain in Comments  Unable to obtain at this time    Hydroxyzine Hcl Rash    Insulin      Other reaction(s): Other-Reaction in Comments  Patient is very sensitive to insulin administration, especially when treating hyperkalemia.  Has a hx of blood glucose 12.  Closely monitor for severe hypoglycemia that could precipitate seizures. If it's required, then give less insulin/more dextrose to prevent such hypoglycemic episodes.    9/25/2022   Verified by Dr. Kelsie Emerson (endocrinoligy)    Compazine Anxiety    Fentanyl And Related Anxiety    Metoclopramide Anxiety    Tape Rash     Paper tape is ok       Medications  Prior to Admission Medications   Prescriptions Last Dose Informant Patient Reported? Taking?   HYDROmorphone (DILAUDID) 2 MG Tab PRN at PRN Family Member Yes No   Sig: Take 2 mg by mouth every 8 hours as needed for Severe Pain.   LORazepam (ATIVAN) 0.5 MG Tab New RX at NOT STARTED OUTPATIENT Family Member No No   Sig: Take 1 Tablet by Enteral Tube route every 8 hours as needed for Anxiety for up to 30 days.   OLANZapine (ZYPREXA) 2.5 MG Tab New RX at NOT STARTED OUTPATIENT Family Member No No   Sig: Take 1 Tablet by Enteral Tube route every evening.   carvedilol (COREG) 3.125 MG Tab New RX at NOT STARTED OUTPATIENT Family Member No No   Sig: Take 1 Tablet by Enteral Tube route 2 times a day with meals.   cloNIDine (CATAPRES) 0.2 MG/24HR PATCH WEEKLY patch 6/26/2023 at ON @ 1150 Family Member, Historical Yes Yes   Sig: Place 1 Patch on the skin every 7 days.   escitalopram (LEXAPRO) 10 MG Tab New RX at NOT STARTED OUTPATIENT Family Member No No   Sig: Take 1 Tablet by Enteral Tube route every day.   folic acid  (FOLVITE) 1 MG Tab New RX at NOT STARTED OUTPATIENT Family Member No No   Sig: Take 1 Tablet by Enteral Tube route every day.   hydroxychloroquine (PLAQUENIL) 200 MG Tab 6/13/2023 at AM Family Member, Historical Yes No   Sig: Take 200 mg by mouth every morning.   losartan (COZAAR) 25 MG Tab New RX at NOT STARTED OUTPATIENT Family Member No No   Sig: Take 1 Tablet by Enteral Tube route every day.   mycophenolate (CELLCEPT) 200 MG/ML suspension New RX at NOT DISPENSED Family Member No No   Sig: Take 2.5 mL by Enteral Tube route every morning for 30 days.   omeprazole (PRILOSEC) 40 MG delayed-release capsule New RX at NOT STARTED OUTPATIENT Family Member No No   Sig: Take 1 Capsule by Enteral Tube route every day.   ondansetron (ZOFRAN ODT) 4 MG TABLET DISPERSIBLE New RX at NOT STARTED OUTPATIENT Family Member No No   Sig: Take 1 Tablet by Enteral Tube route every four hours as needed for Nausea/Vomiting (give PO if no IV route available).   predniSONE (DELTASONE) 5 MG Tab 6/13/2023 at AM Family Member, Historical Yes No   Sig: Take 5 mg by mouth every day.   therapeutic multivitamin-minerals (THERAGRAN-M) Tab New RX at NOT STARTED OUTPATIENT Family Member No No   Sig: Take 1 Tablet by Enteral Tube route every day.   thiamine (THIAMINE) 100 MG tablet New RX at NOT STARTED OUTPATIENT Family Member No No   Sig: Take 1 Tablet by Enteral Tube route every day.   zinc sulfate (ZINCATE) 220 (50 Zn) MG Cap New RX at NOT STARTED OUTPATIENT Family Member No No   Sig: Take 1 Capsule by Enteral Tube route every day.      Facility-Administered Medications: None       Physical Exam  Temp:  [36.3 °C (97.3 °F)-36.7 °C (98 °F)] 36.7 °C (98 °F)  Pulse:  [] 75  Resp:  [16-20] 20  BP: (110-144)/() 110/80  SpO2:  [91 %-100 %] 100 %  Blood Pressure: 110/80   Temperature: 36.7 °C (98 °F)   Pulse: 75   Respiration: 20   Pulse Oximetry: 100 %       Physical Exam  Vitals reviewed.   Constitutional:       Appearance: She is  cachectic. She is ill-appearing. She is not diaphoretic.   HENT:      Head: Normocephalic and atraumatic.      Nose: Nose normal.      Mouth/Throat:      Pharynx: No oropharyngeal exudate.   Eyes:      General: No scleral icterus.        Right eye: No discharge.         Left eye: No discharge.      Extraocular Movements: Extraocular movements intact.      Conjunctiva/sclera: Conjunctivae normal.   Cardiovascular:      Rate and Rhythm: Normal rate and regular rhythm.      Pulses:           Radial pulses are 2+ on the right side and 2+ on the left side.        Dorsalis pedis pulses are 2+ on the right side and 2+ on the left side.      Heart sounds: No murmur heard.  Pulmonary:      Effort: Pulmonary effort is normal. No respiratory distress.      Breath sounds: Normal breath sounds. No wheezing.   Abdominal:      General: Bowel sounds are normal. There is no distension.      Palpations: Abdomen is soft.      Tenderness: There is no abdominal tenderness.   Musculoskeletal:         General: No swelling or tenderness.      Cervical back: No muscular tenderness.      Right lower leg: No edema.      Left lower leg: No edema.      Comments: Atrophy of musculature   Lymphadenopathy:      Cervical: No cervical adenopathy.   Skin:     Coloration: Skin is not jaundiced or pale.   Neurological:      General: No focal deficit present.      Mental Status: Mental status is at baseline. She is lethargic.      Cranial Nerves: No cranial nerve deficit.   Psychiatric:         Mood and Affect: Mood normal.         Behavior: Behavior is slowed.         Laboratory:  Recent Labs     06/27/23  0309 06/29/23  1434   WBC 2.6* 3.9*   RBC 3.53* 3.52*   HEMOGLOBIN 8.4* 8.5*   HEMATOCRIT 28.6* 28.5*   MCV 81.0* 81.0*   MCH 23.8* 24.1*   MCHC 29.4* 29.8*   RDW 71.7* 71.8*   PLATELETCT 205 260   MPV  --  9.8     Recent Labs     06/27/23  1434 06/28/23  0223 06/29/23  1434   SODIUM 131* 131* 132*   POTASSIUM 4.3 4.1 3.8   CHLORIDE 95* 98 95*   CO2  25 25 29   GLUCOSE 101* 103* 89   BUN 20 25* 17   CREATININE 3.36* 3.64* 3.07*   CALCIUM 8.7 8.3* 8.5     Recent Labs     06/27/23  0309 06/27/23  1434 06/28/23  0223 06/29/23  1434   ALTSGPT 7  --  <5 6   ASTSGOT 10*  --  12 14   ALKPHOSPHAT 69  --  74 73   TBILIRUBIN 0.5  --  0.4 0.4   LIPASE  --   --   --  24   GLUCOSE 72 101* 103* 89         No results for input(s): NTPROBNP in the last 72 hours.  Recent Labs     06/27/23  0309   TRIGLYCERIDE 120   HDL 15*   LDL 47     No results for input(s): TROPONINT in the last 72 hours.    Imaging:  DX-ABDOMEN FOR TUBE PLACEMENT   Final Result      Feeding tube tip at the descending duodenum.            Assessment/Plan:  Justification for Admission Status  I anticipate this patient is appropriate for observation status at this time because of need of enteral supplies to her house to be varified prior to discharge due to recurrent hypoglycemia due to lack of glucose storage    Patient will need a Med/Surg bed on MEDICAL service .  The need is secondary to recurrent hypoglycemia.    * Hypoglycemia- (present on admission)  Assessment & Plan  Lack of nutritional enteral supplies upon discharge.   to confirm enteral supplies  Consideration of near future PEG tube  Hypoglycemic protocol    Subclinical hypothyroidism- (present on admission)  Assessment & Plan  TSH:9.130  Monitor outpatient    Pulmonary hypertension (HCC)- (present on admission)  Assessment & Plan  On supplemental oxygen  Due to MCTD and LUPUS    Systemic lupus erythematosus with organ system involvement (HCC)- (present on admission)  Assessment & Plan  On immunotherapy and to continue current outpatient meds  Follow up with rheumatologist.    Hyponatremia- (present on admission)  Assessment & Plan  Chronic  6/29 Na:132    Severe protein-calorie malnutrition (HCC)- (present on admission)  Assessment & Plan  N/V  Supplemental feeding via Cortrak/NG tube  Needs a PEG in near future    Anemia- (present on  admission)  Assessment & Plan  Of chronic disease    Immunosuppression (HCC)- (present on admission)  Assessment & Plan  DUe to immunosuppresive medication for lupus    Chronic respiratory failure with hypoxia (HCC)- (present on admission)  Assessment & Plan  pulm htn  Continue supplemental O2    ESRD (end stage renal disease) ([facility])- (present on admission)  Assessment & Plan  On HD M,W,F  Monitor labs    Arteriovenous fistula, acquired ([facility])- (present on admission)  Assessment & Plan  For HD         VTE prophylaxis: SCDs/TEDs

## 2023-06-30 NOTE — ASSESSMENT & PLAN NOTE
- Had bleeding after a scab popped off.  Now s/p fistulogram, central venogram and venoplasty of central venous stenosis.  -Monitor for any bleeding.

## 2023-06-30 NOTE — PROGRESS NOTES
Hypoglycemia Intervention    Hypoglycemia protocol intervention:  Blood glucose 135 at 2355.

## 2023-06-30 NOTE — PROGRESS NOTES
Received report from day shift RN. Assumed patient care, NAD, A&O x4, patient resting in bed. Waiting on verification of tube placement to give medications and start tube feeding, strict NPO. Patient stating high pain, nausea, and appears very anxious. Mom at bedside, POC with patient. Educated on the importance of feedings relating to hypoglycemia. BG coming on shift was 70. Dextrose bolus initiated, Will continue to monitor and follow hypoglycemia protocol. Call light within reach.

## 2023-06-30 NOTE — PROGRESS NOTES
Hypoglycemia Intervention    Hypoglycemia protocol intervention:  Blood glucose 70 at 1832. Dr. Gibson notified   Intervention: 25 g IV dextrose per MAR     Endorsed to NOC RNDeepti will complete hypoglycemic protocol

## 2023-07-01 PROBLEM — D63.8 ANEMIA OF CHRONIC DISEASE: Status: ACTIVE | Noted: 2021-12-20

## 2023-07-01 NOTE — CONSULTS
"  VASCULAR SURGERY SERVICE  CONSULT NOTE      Date: 7/1/2023    Referring Provider: Olu Riggs M.d.    Consulting Physician: Fadi Emerson MD - [facility]     -------------------------------------------------------------------------------------------------    Reason for consultation:  Bleeding right upper arm dialysis fistula.    HPI:  This is a 27 y.o. female with multiple medical problem including end-stage renal disease on hemodialysis via a right upper arm arteriovenous fistula.  Patient has history of central venous stenosis which was treated with stenting by Dr. Dotson.  About 2 weeks ago, patient was admitted for failure to thrive as well as bleeding at the fistula.  She underwent fistulogram and venoplasty of the central venous stenosis.  She had no further bleeding after that and eventually discharged home after arrangement was made for to have home feeding through the feeding tube.  She was readmitted for hypoglycemia.  She was dialyzed yesterday with no problem.  Today, per report, she was using her right hand to pull the table and started to have bleeding from the fistula.  The bleeding has stopped with pressure.  I was consulted.    Past Medical History:   Diagnosis Date    Anemia 01/17/2018    AVF (arteriovenous fistula) ([facility])     Right Arm    Chest pain     Chest tightness     Daytime sleepiness     Dialysis patient ([facility])      dialysis, M,W,F Elizabeth/Joni    Difficulty breathing     ESRD (end stage renal disease) on dialysis ([facility]) 01/17/2018    Twan Fu for breath     Heart burn     Hypertension 01/17/2018    \"Controlled with medication\"    Indigestion     Lupus ([facility])     Migraines 01/17/2018    Painful breathing     Palpitations     Seizure ([facility]) 2013    from high blood pressure, reports 1 time event    Shortness of breath     Swelling of lower extremity     Wheezing        Past Surgical History:   Procedure Laterality Date    CA UPPER GI ENDOSCOPY,DIAGNOSIS N/A 8/26/2022    " Procedure: ESOPHAGOGASTRODUODENOSCOPY;  Surgeon: Parveen Wood M.D.;  Location: [facility];  Service: Gastroenterology    ME UPPER GI ENDOSCOPY,CTRL BLEED N/A 8/26/2022    Procedure: GASTROSCOPY, WITH ARGON PLASMA COAGULATION;  Surgeon: Parveen Wood M.D.;  Location: [facility];  Service: Gastroenterology    ME BRONCHOSCOPY,DIAGNOSTIC Bilateral 5/13/2021    Procedure: BRONCHOSCOPY, BRONCHOALVEOLAR LAVAGE;  Surgeon: William Spangler M.D.;  Location: [facility];  Service: Pulmonary    ME UPPER GI ENDOSCOPY,DIAGNOSIS N/A 3/19/2021    Procedure: GASTROSCOPY;  Surgeon: Waylon Mcqueen M.D.;  Location: [facility];  Service: Gastroenterology    ME UPPER GI ENDOSCOPY,CTRL BLEED  3/19/2021    Procedure: GASTROSCOPY, WITH ARGON PLASMA COAGULATION;  Surgeon: Waylon Mcqueen M.D.;  Location: [facility];  Service: Gastroenterology    ME UPPER GI ENDOSCOPY,DIAGNOSIS  3/5/2021    Procedure: GASTROSCOPY - W/HEMOSTASIS;  Surgeon: Ej Silva M.D.;  Location: [facility];  Service: Gastroenterology    ME COLONOSCOPY,DIAGNOSTIC  1/8/2021    Procedure: COLONOSCOPY;  Surgeon: Herbert Contreras M.D.;  Location: [facility];  Service: Gastroenterology    ME UPPER GI ENDOSCOPY,DIAGNOSIS  1/8/2021    Procedure: GASTROSCOPY;  Surgeon: Herbert Contreras M.D.;  Location: [facility];  Service: Gastroenterology    ME UPPER GI ENDOSCOPY,CTRL BLEED  1/8/2021    Procedure: GASTROSCOPY, WITH ARGON PLASMA COAGULATION;  Surgeon: Herbert Contreras M.D.;  Location: [facility];  Service: Gastroenterology    ME UPPER GI ENDOSCOPY,CTRL BLEED  11/12/2020    Procedure: GASTROSCOPY, WITH ARGON PLASMA COAGULATION;  Surgeon: Gadiel Whitney M.D.;  Location: [facility];  Service: Gastroenterology    ME UPPER GI ENDOSCOPY,BIOPSY  11/12/2020    Procedure: GASTROSCOPY, WITH BIOPSY;  Surgeon: Gadiel Whitney M.D.;  Location: [facility];  Service:  "Gastroenterology    GASTROSCOPY-ENDO  11/12/2020    Procedure: GASTROSCOPY;  Surgeon: Gadiel Whitney M.D.;  Location: SURGERY [facility];  Service: Gastroenterology    GASTROSCOPY-ENDO  9/18/2020    Procedure: GASTROSCOPY;  Surgeon: Gadiel Whitney M.D.;  Location: SURGERY [facility];  Service: Gastroenterology    GASTROSCOPY N/A 5/30/2020    Procedure: GASTROSCOPY;  Surgeon: Waylon Mcqueen M.D.;  Location: SURGERY [facility];  Service: Gastroenterology    GASTROSCOPY-ENDO  12/9/2019    Procedure: GASTROSCOPY;  Surgeon: Aaron Kam M.D.;  Location: SURGERY [facility];  Service: Gastroenterology    ANGIOPLASTY  01/17/2018    \"Right Arm AV-Fistulagram & Angioplastyx3\"    ULI BY LAPAROSCOPY  4/5/2010    Performed by SYED MARTELL at SURGERY [facility]    AV FISTULA CREATION Right     OTHER      renal biopsy x 3    OTHER      bone marrow biopsy       Current Facility-Administered Medications   Medication Dose Route Frequency Provider Last Rate Last Admin    therapeutic multivitamin-minerals (Theragran-M) tablet 1 Tablet  1 Tablet Oral DAILY Mary Mtz, A.P.R.N.        losartan (Cozaar) tablet 25 mg  25 mg Oral DAILY Mary Mtz, A.P.R.N.        senna-docusate (Pericolace Or Senokot S) 8.6-50 MG per tablet 2 Tablet  2 Tablet Oral BID Mary Mtz A.P.R.N.        And    polyethylene glycol/lytes (Miralax) PACKET 1 Packet  1 Packet Oral QDAY PRN Mary Mtz, A.P.R.N.        And    magnesium hydroxide (Milk Of Magnesia) suspension 30 mL  30 mL Oral QDAY PRN Mary Mtz, A.P.R.N.        And    bisacodyl (Dulcolax) suppository 10 mg  10 mg Rectal QDAY PRN Mary Mtz, A.P.R.N.        OLANZapine (ZyPREXA) tablet 2.5 mg  2.5 mg Oral Q EVENING Mary Mtz, A.P.R.N.        thiamine (Vitamin B-1) tablet 100 mg  100 mg Oral DAILY ERICA Marino        mycophenolate (Cellcept) 200 MG/ML susp 500 mg  500 mg Oral QACASIE " BRIANNA MarinoPJgRKATY        hydroxychloroquine (Plaquenil) tablet 200 mg  200 mg Oral QAM AMARILIS MarinoRKATY        predniSONE (Deltasone) tablet 5 mg  5 mg Oral DAILY BRIANNA MarinoPJgRKATY        escitalopram (Lexapro) tablet 10 mg  10 mg Oral DAILY BRIANNA MarinoPJgRKATY        folic acid (Folvite) tablet 1 mg  1 mg Oral DAILY BRIANNA MarinoPJgRKATY        carvedilol (Coreg) tablet 3.125 mg  3.125 mg Oral BID WITH MEALS AMARILIS MarinoRKATY        omeprazole (PriLOSEC) capsule 20 mg  20 mg Oral DAILY BRIANNA MarinoP.RKATY        HYDROmorphone (Dilaudid) tablet 2 mg  2 mg Oral Q6HRS PRN BRIANNA MarinoPJgRKATY        zinc sulfate (Zincate) capsule 220 mg  220 mg Oral DAILY BRIANNA MarinoP.RKATY        promethazine (Phenergan) tablet 25 mg  25 mg Oral Q6HRS PRN Olu Riggs M.D.   25 mg at 07/01/23 1106    prochlorperazine (Compazine) injection 10 mg  10 mg Intravenous Q4HRS PRN Olu Riggs M.D.   10 mg at 07/01/23 1137    LORazepam (Ativan) injection 0.5 mg  0.5 mg Intravenous Q6HRS PRN Olu Riggs M.D.   0.5 mg at 07/01/23 1136    Or    LORazepam (Ativan) tablet 0.5 mg  0.5 mg Oral Q6HRS PRN Olu Riggs M.D.        dextrose 10% infusion   Intravenous Continuous Olu iRggs M.D. 75 mL/hr at 07/01/23 1336 New Bag at 07/01/23 1336    diphenhydrAMINE (Benadryl) injection 25 mg  25 mg Intravenous Q6HRS PRN BRIANNA MarinoP.R.N.   25 mg at 07/01/23 0512    cloNIDine (Catapres) 0.2 MG/24HR patch 1 Patch  1 Patch Transdermal Q7 DAYS Rivera Gibson D.O.   1 Patch at 06/29/23 2117    hydrALAZINE (Apresoline) injection 10 mg  10 mg Intravenous Q4HRS PRN Rivera Gibson D.O.   10 mg at 06/30/23 1555    dextrose 10 % BOLUS 25 g  25 g Intravenous Q15 MIN PRN Rivera Gibson D.O. 999 mL/hr at 07/01/23 1048 25 g at 07/01/23 1048    ondansetron (Zofran) syringe/vial injection 4 mg  4 mg Intravenous Q4HRS PRN Shelby Alvarado,  A.P.R.N.   4 mg at 07/01/23 1416       Social History     Socioeconomic History    Marital status: Single     Spouse name: Not on file    Number of children: Not on file    Years of education: Not on file    Highest education level: Not on file   Occupational History    Not on file   Tobacco Use    Smoking status: Never    Smokeless tobacco: Never   Vaping Use    Vaping Use: Never used   Substance and Sexual Activity    Alcohol use: No    Drug use: No    Sexual activity: Not on file   Other Topics Concern    Not on file   Social History Narrative    Not on file     Social Determinants of Health     Financial Resource Strain: Low Risk  (3/8/2021)    Overall Financial Resource Strain (CARDIA)     Difficulty of Paying Living Expenses: Not hard at all   Food Insecurity: No Food Insecurity (3/8/2021)    Hunger Vital Sign     Worried About Running Out of Food in the Last Year: Never true     Ran Out of Food in the Last Year: Never true   Transportation Needs: No Transportation Needs (3/8/2021)    PRAPARE - Transportation     Lack of Transportation (Medical): No     Lack of Transportation (Non-Medical): No   Physical Activity: Not on file   Stress: Not on file   Social Connections: Not on file   Intimate Partner Violence: Not on file   Housing Stability: Not on file       Family History   Problem Relation Age of Onset    Diabetes Paternal Grandmother        Allergies:  Cephalexin, Clindamycin, Hydrocodone, Maxipime [cefepime], Methylprednisolone, Metoprolol,  , Diagnostic x-ray materials, Furosemide, Hydroxyzine hcl, Insulin, Compazine, Fentanyl and related, Metoclopramide, and Tape    Review of Systems:    Constitutional: Generalized weakness   HENT:   Negative for hearing loss or tinnitus    Eyes:    Negative for blurred vision, double vision, or loss of vision  Respiratory:  Negative for cough, hemoptysis, or wheezing    Cardiac:  Negative for chest pain or palpitations or orthopnea  Vascular:  Negative for claudication  or rest pain   Gastrointestinal: Positive for nausea     Negative for hematochezia or melena   Genitourinary: Negative for dysuria, frequency, or hematuria   Musculoskeletal: Positive for myalgias, back pain, or joint pain  Skin:   Negative for itching or rash  Neurological:  Negative for dizziness, headaches, or tremors     Negative for speech disturbance     Negative for extremity weakness or paresthesias  Endo/Heme:  Negative for easy bruising or bleeding  Psychiatric:  Negative for depression, suicidal ideas, or hallucinations    Physical Exam:  BP (!) 163/112   Pulse 83   Temp 36.3 °C (97.3 °F)   Resp 16   Wt 43.5 kg (95 lb 14.4 oz)   SpO2 92%     Constitutional: Cachectic looking female, sleepy   HEENT:  Normocephalic and atraumatic, EOMI  Neck:   Supple, no JVD  Cardiovascular: Regular rate and rhythm  Pulmonary:  Good air entry bilaterally  Neurological:  CN II-XII grossly intact, no focal deficits  Right upper extremity: Well-healed scars.  Fistula in right upper arm with areas of dilatation.  The fistula is pulsatile from mid upper arm to proximal upper arm.  There is no active bleeding.  There is a small superficial skin opening over the fistula at the junction of the mid and distal upper arm.    Labs:  Recent Labs     06/29/23  1434 07/01/23  1138   WBC 3.9* 5.5   RBC 3.52* 3.45*   HEMOGLOBIN 8.5* 8.3*   HEMATOCRIT 28.5* 28.3*   MCV 81.0* 82.0   MCH 24.1* 24.1*   MCHC 29.8* 29.3*   RDW 71.8* 73.0*   PLATELETCT 260 187   MPV 9.8 9.4     Recent Labs     06/29/23  1434 07/01/23  1138   SODIUM 132* 125*   POTASSIUM 3.8 4.3   CHLORIDE 95* 91*   CO2 29 27   GLUCOSE 89 103*   BUN 17 12   CREATININE 3.07* 2.52*   CALCIUM 8.5 7.9*         Recent Labs     06/29/23  1434 07/01/23  1138   ASTSGOT 14 17   ALTSGPT 6 5   TBILIRUBIN 0.4 0.4   ALKPHOSPHAT 73 169*   GLOBULIN 6.0* 6.0*           Assessment:  -Right upper arm arteriovenous fistula bleeding, more than likely secondary to recurrent central venous  stenosis.  -End-stage renal disease, on hemodialysis.  -Pulmonary hypertension.  -Systemic lupus erythematosus.  -Failure to thrive.    Plan:  I had a long discussion with patient and her family member.  I recommended that patient undergoes dialysis fistulogram with possible endovascular intervention if the fistulogram confirmed the presence of recurrent stenosis.  Risks and benefits of the procedure were discussed.  Risks include, but not limited to, bleeding, infection, and perioperative anesthetic complications.  Patient and her family member are aware that stenosis will recur and that patient will need to undergo fistulogram and venoplasty again in the future.  They indicated understanding would like to proceed with right upper arm dialysis fistulogram and possible intervention.  All questions were answered.      Fadi Emerson MD  [facility]   [place] preferred or call my office [phone]  __________________________________________________________________  Patient:Lily Nicole   MRN:[id number]   CSN:[id number]

## 2023-07-01 NOTE — PROGRESS NOTES
Nephrology Daily Progress Note    Date of Service  7/1/2023    Chief Complaint  27 y.o. female admitted 6/29/2023 with hypoglycemia, generalized weakness    Interval Problem Update  Patient is doing better today, no chest pain, no shortness of breath.    Review of Systems  Review of Systems   Constitutional:  Positive for malaise/fatigue. Negative for chills and fever.   Respiratory:  Negative for cough and shortness of breath.    Cardiovascular:  Negative for chest pain and leg swelling.   Gastrointestinal:  Negative for nausea and vomiting.   Genitourinary:  Negative for dysuria, frequency and urgency.        Physical Exam  Temp:  [36.1 °C (97 °F)-36.9 °C (98.4 °F)] 36.3 °C (97.3 °F)  Pulse:  [78-93] 83  Resp:  [15-16] 16  BP: (137-168)/() 163/112  SpO2:  [92 %-100 %] 92 %    Physical Exam  Vitals and nursing note reviewed.   Constitutional:       General: She is not in acute distress.     Appearance: Normal appearance. She is well-developed. She is ill-appearing. She is not diaphoretic.   HENT:      Head: Normocephalic and atraumatic.      Right Ear: External ear normal.      Left Ear: External ear normal.      Nose: Nose normal.   Eyes:      General: No scleral icterus.        Right eye: No discharge.         Left eye: No discharge.      Conjunctiva/sclera: Conjunctivae normal.   Cardiovascular:      Rate and Rhythm: Normal rate and regular rhythm.      Heart sounds: No murmur heard.  Pulmonary:      Effort: Pulmonary effort is normal. No respiratory distress.      Breath sounds: Normal breath sounds.   Musculoskeletal:         General: No tenderness.      Right lower leg: No edema.      Left lower leg: No edema.   Skin:     General: Skin is warm and dry.      Findings: No erythema.   Neurological:      General: No focal deficit present.      Mental Status: She is oriented to person, place, and time. She is lethargic.      Cranial Nerves: No cranial nerve deficit.   Psychiatric:         Mood and Affect:  Mood normal.         Behavior: Behavior normal.         Fluids    Intake/Output Summary (Last 24 hours) at 7/1/2023 1217  Last data filed at 7/1/2023 1118  Gross per 24 hour   Intake 3115.27 ml   Output 1500 ml   Net 1615.27 ml       Laboratory  Recent Labs     06/29/23  1434   WBC 3.9*   RBC 3.52*   HEMOGLOBIN 8.5*   HEMATOCRIT 28.5*   MCV 81.0*   MCH 24.1*   MCHC 29.8*   RDW 71.8*   PLATELETCT 260   MPV 9.8     Recent Labs     06/29/23  1434 07/01/23  1138   SODIUM 132* 125*   POTASSIUM 3.8 4.3   CHLORIDE 95* 91*   CO2 29 27   GLUCOSE 89 103*   BUN 17 12   CREATININE 3.07* 2.52*   CALCIUM 8.5 7.9*         No results for input(s): NTPROBNP in the last 72 hours.        Imaging  DX-ABDOMEN FOR TUBE PLACEMENT   Final Result      Feeding tube tip at the descending duodenum.            Assessment/Plan  1 ESRD  2 hypoglycemia  3 anemia  4 hyponatremia  no acute need for HD today  Epogen with dialysis hypoglycemia  Free water restriction  Renal diet  Daily BMP, CBC.  Renal dose all meds  Avoid nephrotoxins like NSAIDs.  Prognosis guarded.

## 2023-07-01 NOTE — CONSULTS
DATE OF SERVICE:  06/30/2023     REQUESTING PHYSICIAN:  Rivera Gibson DO     REASON FOR CONSULTATION:  Management of end-stage renal disease, assess need   for urgent dialysis.     Unfortunately, the patient is lethargic.  She is a poor historian.  Most of   the story was reviewing record, discussing the case with primary team.     HISTORY OF PRESENT ILLNESS:  Briefly, the patient is an unfortunate   27-year-old lady with past medical history significant for end-stage renal   disease, undergoes hemodialysis 3 times a week, was recently hospitalized with   failure to thrive, was discharged on 06/28, she came back last night with   hypoglycemia.  Again, the patient is lethargic and poor historian.     Past medical history, social history, family history, medications, review of   systems as well as allergies are unobtainable.  Please refer to the chart.  I   did review the history and physical note by Dr. Rivera Gibson on 06/29 and I   agree with his finding.     PHYSICAL EXAMINATION:  GENERAL:   The patient appears ill, cachectic.  She is lethargic.  VITAL SIGNS:  Showed blood pressure of 168/96, heart rate was 54, respiratory   rate was 16.  HEENT:  Normocephalic, atraumatic.  Sclerae are anicteric.  Pupils are   reactive.  Nose normal.  Mucous membrane is moist.  NECK:  No lymphadenopathy, no JVD, no thyroid mass.  CHEST:  Normal.  LUNGS:  Clear to auscultation.  HEART:  S1, S2.  ABDOMEN:  Soft, nontender.  No hepatosplenomegaly.  There is no inguinal   lymphadenopathy.  EXTREMITIES:  There is no lower extremity edema.  SKIN:  No skin rash.  NEUROLOGIC:  The patient is lethargic.     LABORATORY DATA:  Her labs are from yesterday, creatinine 3.0, potassium 3.8.     ASSESSMENT:  1.  End-stage renal disease.  2.  Hypoglycemia.  3.  Failure to thrive.  4.  Systemic lupus erythematosus.  5.  Hyponatremia.  6.  Anemia.     PLAN:  1.  We will plan urgent dialysis to manage uremia.  2.  Evaluate daily for the need of  dialysis.  3.  Erythropoietin with dialysis.  4.  Nutrition support.  5.  Renal dose all medications.  6.  Prognosis is poor.  7.  Consider palliative consult.     Plan discussed with the primary team.        ______________________________  FADI NAJJAR, MD FN/LESLY/GABRIELLE    DD:  06/30/2023 14:44  DT:  06/30/2023 17:45    Job#:  [id number]

## 2023-07-01 NOTE — PROGRESS NOTES
Pt presents to IR 1. Patient was consented by MD at bedside, confirmed by this RN and consent at bedside. Pt transferred to IR 1 table in Supine position. Patient underwent a Right upper extremity angiogram with angioplasty of AV fistula by Dr. Emerson. Procedure site was marked by MD and verified using imaging guidance. Pt placed on monitor, prepped and draped in a sterile fashion. Vitals were taken every 5 minutes and remained stable during procedure (see doc flow sheet for results). CO2 waveform capnography was monitored and remained WNL throughout procedure. Report called to Poonam MARTINEZ. Pt transported by Hospital bed with RN to T725.

## 2023-07-01 NOTE — PROGRESS NOTES
[facility] Progress Note     Hemodialysis treatment x 3 hours completed as ordered per Dr. Najjar  Treatment performed in dialysis MTR started at 1509 and ended at 1810.       Net UF Removed:  1,000 mL     Procedure explained, verbalized understanding, consent for treatment obtained.  Patient and access assessed pre and post treatment.  Patient very anxious during initiation of treatment, medicated for anxiety by PCN per MAR.  Patient blood pressures elevated during treatment asymptomatic, PRN blood pressure med given by PCN per MAR.  Patient tolerated treatment overall. UF goal reached as tolerated  See dialysis electronic flow sheets under media for details.     Post tx access: 16 g cannulation needles removed from RUE AVF access sites, hemostasis established on each insertion site within 10 minutes e  ach; verified no bleeding. (+) bruit/thrill post treatment. Covered with clean gauze dressings and secured with tape.     Please monitor for RUE AVF access bleeding. Should any breakthrough bleeding occur, please apply gauze and firm pressure on site until bleeding resolves then cover with gauze dressing and tape. Please observe RUE precautions- NO taking of BPs or blood draws to RUE    Please notify Nephrologist/Dialysis for follow-up.    1845 Patient transported back to room T725-01 via bed by transport and accompanied by patient's mother. Patient awake, alert with O2 on at 5L/NC.    1922 Report given to NOC primary care nurse GILES Jc RN.

## 2023-07-01 NOTE — PROGRESS NOTES
Called to pt's room. Pt reported that the scab came off of her AV fistula. Pt found holding pressure to her right upper arm and bleeding. Pressure applied. Assisted by charge nurse, Sushil. Dr. Riggs notified. Attempted pressure via sphygmomanometer, but pt unable to tolerate 2/2 pain. Returned to manual pressure. Bleeding slowed after 15 minutes and pressure dressing applied.  Checked pt's blood glucose one hour after turning off the D10. Pt cold and clammy with a blood glucose of 58. Pt attempted to take juice PO, but c/o feeling nauseated. PRN D10 bolus administered. Pt's blood glucose now measuring 89.  Pt took PO PRN phenergan but does not feel that she can tolerate anymore PO. MD notified and additional IV antiemetic ordered along with IV ativan.   Continuing to monitor fistula for bleeding and radial pulse for perfusion.

## 2023-07-01 NOTE — CARE PLAN
The patient is Watcher - Medium risk of patient condition declining or worsening    Shift Goals  Clinical Goals: Control of pain and nausea, monitor for hypoglycemia  Patient Goals: Comfort  Family Goals: comfort    Progress made toward(s) clinical / shift goals:  Pt c/o nausea. Unable to tolerate PO meds and minimal PO intake of food or beverage. Bleeding AV fistula. Pt to IR for fistulogram.     Patient is not progressing towards the following goals:      Problem: Nutrition  Goal: Patient's nutritional and fluid intake will be adequate or improve  Outcome: Not Progressing     Problem: Gastrointestinal Irritability  Goal: Nausea and vomiting will be absent or improve  Outcome: Not Progressing

## 2023-07-01 NOTE — PROGRESS NOTES
Hospital Medicine Daily Progress Note    Date of Service  7/1/2023    Chief Complaint  hypoglycemia    Hospital Course  Lily Nicole is a 27 y.o. female with history of SLE, glomerulonephritis with ESRD on hemodialysis MWF, HFrEF, possible mixed connective tissue disease, severe malnutrition requiring NGT placement, hypoglycemia with seizure, hypertension, history of endocarditis bacteremia and cardiac arrest (November 2022), pulmonary hypertension, and chronic oxygen dependence (5 L), who presents to ED on 6/29/2023 for hypoglycemia and dizziness.  Apparently, her feeding tube supplies were not delivered to her home.  Notably she was deemed incapacitated to make medical or placement decisions by psychiatry during last hospitalization.  On evaluation, she is on 7 L of oxygen by nasal cannula and reports diffuse pain.  She states she does not want PEG tube placed.  She was hypoglycemic with BG of 55, and required several dextrose boluses.  She is started on IV D10 infusion.  Palliative care was consulted, and after goals of care discussion patient opted to remove NGT and does not want PEG tube placement.  Mother is surrogate decision maker when critical.  She is made DNR with trial of intubation.      Interval Problem Update  7/1/2023 - I reviewed the patient's chart. There were no significant overnight events. Remains hemodynamically stable and afebrile. Stable on baseline 5 L of oxygen by nasal cannula.  No new labs.  She had hemodialysis yesterday, had 1 L of ultrafiltration removed.  No episodes of hypoglycemia, but BG is running 80-100s.    > I have personally seen and examined the patient today.  She states she is tired, appears fatigued.  Pain is better.  Feels a little nauseated.  No chest pain or shortness of breath.  Per nursing, she is eating minimally.  We did a trial to take her off D10 infusion, however blood glucose went down to the 50s.  She also had bleeding from her AVF after a scab popped  out.  I called Dr. Emerson of vascular surgery for evaluation of the AV fistula bleeding.  Pressure applied to the AVF.    I personally reviewed all lab results mentioned above. Prior medical records from this institution and outside facilities were independently reviewed as noted. I also personally reviewed all consultant recommendations and plans as documented above. History was independently obtained by myself. I have discussed this patient's plan of care and discharge plan at IDT rounds today with Case Management, Nursing, Nursing leadership, and other members of the IDT team.      Consultants/Specialty  nephrology and palliative care    Code Status  DNAR, I OK    Disposition  The patient is not medically cleared for discharge to home or a post-acute facility.    Anticipate discharge to: Unknown - hospice vs post-acute vs home  I have placed the appropriate orders for post-discharge needs.    Review of Systems  ROS     Pertinent positives/negatives as mentioned above.     A complete review of systems was personally done by me. All other systems were negative.       Physical Exam  Temp:  [36.1 °C (97 °F)-36.9 °C (98.4 °F)] 36.3 °C (97.3 °F)  Pulse:  [78-93] 83  Resp:  [15-16] 16  BP: (137-163)/() 163/112  SpO2:  [92 %-100 %] 92 %    Physical Exam  Vitals and nursing note reviewed.   Constitutional:       Appearance: She is cachectic. She is ill-appearing.   HENT:      Head: Normocephalic and atraumatic.      Right Ear: External ear normal.      Left Ear: External ear normal.      Mouth/Throat:      Mouth: Mucous membranes are dry.   Eyes:      Pupils: Pupils are equal, round, and reactive to light.   Cardiovascular:      Rate and Rhythm: Normal rate and regular rhythm.      Pulses: Normal pulses.      Heart sounds: Murmur heard.      Comments: Right arm AV fistula, palpable thrill, (+) bleeding, pressure dressing applied  Pulmonary:      Effort: Pulmonary effort is normal. No respiratory distress.       Breath sounds: Normal breath sounds. No wheezing.   Abdominal:      General: Abdomen is flat.      Palpations: Abdomen is soft.      Tenderness: There is no abdominal tenderness. There is no guarding.   Musculoskeletal:         General: No swelling. Normal range of motion.      Cervical back: Normal range of motion and neck supple.      Right lower leg: No edema.      Left lower leg: No edema.   Skin:     General: Skin is dry.      Capillary Refill: Capillary refill takes less than 2 seconds.      Coloration: Skin is pale.   Neurological:      General: No focal deficit present.      Mental Status: Mental status is at baseline. She is lethargic.      Motor: Weakness and atrophy present.   Psychiatric:         Behavior: Behavior is slowed.         Thought Content: Thought content normal.         Judgment: Judgment normal.      Comments: Not very forthcoming         Fluids    Intake/Output Summary (Last 24 hours) at 7/1/2023 1330  Last data filed at 7/1/2023 1118  Gross per 24 hour   Intake 3115.27 ml   Output 1500 ml   Net 1615.27 ml       Laboratory  Recent Labs     06/29/23  1434 07/01/23  1138   WBC 3.9* 5.5   RBC 3.52* 3.45*   HEMOGLOBIN 8.5* 8.3*   HEMATOCRIT 28.5* 28.3*   MCV 81.0* 82.0   MCH 24.1* 24.1*   MCHC 29.8* 29.3*   RDW 71.8* 73.0*   PLATELETCT 260 187   MPV 9.8 9.4     Recent Labs     06/29/23  1434 07/01/23  1138   SODIUM 132* 125*   POTASSIUM 3.8 4.3   CHLORIDE 95* 91*   CO2 29 27   GLUCOSE 89 103*   BUN 17 12   CREATININE 3.07* 2.52*   CALCIUM 8.5 7.9*                   Imaging  DX-ABDOMEN FOR TUBE PLACEMENT   Final Result      Feeding tube tip at the descending duodenum.           Assessment/Plan  * Hypoglycemia- (present on admission)  Assessment & Plan  -brittle hypoglycemia, likely from poor oral intake.  She does not want to go back to NGT feeding.  Not a candidate for PEG placement.  -TSH and cortisol are normal.  -Tried weaning off D10 infusion, but became hypoglycemic.  -Continue D10  infusion for now.  -Continue oral diet, supplement with boost plus.  Monitor BGs every 4 hours.  If BG is better, trial of weaning off D10 infusion again.  -Continue on hypoglycemia protocol.   -May need to consider PEG tube placement for ongoing feeding if persistently hypoglycemic.    Hyponatremia- (present on admission)  Assessment & Plan  -Chronic. Na 127.  -Watch sodium levels specially while on D10 infusion as receiving hypotonic IVF.  BMP every 12 hours.    Arteriovenous fistula, acquired (HCC)- (present on admission)  Assessment & Plan  - Had bleeding after a scab popped off.  Bleeding partially controlled by pressure.  Otherwise, AV fistula working, with palpable thrill and audible bruit.  - I called Dr. mEerson of vascular surgery for evaluation of the AV fistula bleeding.       Chronic heart failure with reduced ejection fraction (HCC)- (present on admission)  Assessment & Plan  -LVEF 45% on echo 6/18/23  -Compensated, does not appear to be in fluid overload.  -Continue Coreg, Cozaar.    Subclinical hypothyroidism- (present on admission)  Assessment & Plan  -TSH:9.130  -Monitor outpatient    Pulmonary hypertension (HCC)- (present on admission)  Assessment & Plan  -5L O2 baseline. Due to MCTD and and lupus.    Systemic lupus erythematosus with organ system involvement (HCC)- (present on admission)  Assessment & Plan  -Continue chronic medications.  Follows up with outpatient rheumatologist.      Severe protein-calorie malnutrition (HCC)- (present on admission)  Assessment & Plan  -Previously on enteral feeding by NG tube.  Does not want further NG tube feeding.  -Palliative care on board.  -Does not want PEG placement, but may need to consider re-evaluation for a G-tube  if persistently hypoglycemic.    Anemia of chronic disease- (present on admission)  Assessment & Plan  - Related to ESRD, autoimmune disorders.  -Hemoglobin stable.  Continue to monitor.  Restrictive transfusion  strategy.    Immunosuppression (HCC)- (present on admission)  Assessment & Plan  -Due to immunosuppresive medication for lupus    Chronic respiratory failure with hypoxia (HCC)- (present on admission)  Assessment & Plan  -Due to history of pulmonary hypertension.  At baseline 5 L oxygen.    -Continue respiratory support, RT protocol, supplemental oxygen.    ESRD (end stage renal disease) (HCC)- (present on admission)  Assessment & Plan  -On HD M,W,F  -Nephrology following.         VTE prophylaxis: SCDs/TEDs

## 2023-07-01 NOTE — CARE PLAN
The patient is Stable - Low risk of patient condition declining or worsening    Shift Goals  Clinical Goals: comfort, hemodynamic stability  Patient Goals: comfort  Family Goals: comfort    Progress made toward(s) clinical / shift goals:    Problem: Knowledge Deficit - Standard  Goal: Patient and family/care givers will demonstrate understanding of plan of care, disease process/condition, diagnostic tests and medications  Outcome: Progressing     Problem: Fall Risk  Goal: Patient will remain free from falls  Outcome: Progressing     Problem: Pain - Standard  Goal: Alleviation of pain or a reduction in pain to the patient’s comfort goal  Outcome: Progressing     Problem: Skin Integrity  Goal: Skin integrity is maintained or improved  Outcome: Progressing     Problem: Nutrition  Goal: Patient's nutritional and fluid intake will be adequate or improve  Outcome: Progressing     Problem: Gastrointestinal Irritability  Goal: Nausea and vomiting will be absent or improve  Outcome: Progressing

## 2023-07-02 NOTE — PROGRESS NOTES
Md Riggs notified of critical Na of 116 and fingerstick of 51, PRN dextrose minibag given per prn order. Fingerstick recheck s/p mini bag admin 112.

## 2023-07-02 NOTE — OR SURGEON
VASCULAR SURGERY IMMEDIATE POST OP NOTE       PreOp Diagnosis: Right arm dialysis fistula bleeding.    PostOp Diagnosis: Same.    Procedure: Right arm dialysis fistula and venoplasty of central venous stenosis.    Surgeon: Fadi Emerson MD      Type of Anesthesia: Local anesthesia with 3 ml 1% lidocaine solution.      IR staff:      Specimens removed if any: None.  * Cannot find log *    Estimated Blood Loss: 5 mL.    Contrast: 16ml Visipaque.    Findings: Moderate to severe central venous stenosis at the junction of the right subclavian vein and the stent.    Complications: None.    Dictated, #[id number].        7/1/2023 5:02 PM Fadi Emerson M.D.

## 2023-07-02 NOTE — CARE PLAN
The patient is Stable - Low risk of patient condition declining or worsening    Shift Goals  Clinical Goals: safety and comfort  Patient Goals: pain control, sleep  Family Goals: comfort    Progress made toward(s) clinical / shift goals:    Problem: Skin Integrity  Goal: Skin integrity is maintained or improved  Outcome: Progressing     Problem: Nutrition  Goal: Patient's nutritional and fluid intake will be adequate or improve  Outcome: Progressing       Patient is not progressing towards the following goals:

## 2023-07-02 NOTE — PROGRESS NOTES
Pt returned from IR. Drowsy. POC blood glucose reading of 42. Per the RN's in IR, the D10 infusion was turned off for 30 minutes during the procedure. PRN D10 bolus administered. BP elevated at 185/119. PRN hydralazine administered. Dr. Riggs notified via voalte. Pt on frequent vitals post sedation. Will recheck pt's blood glucose 15 minutes post D10 bolus.

## 2023-07-02 NOTE — PROGRESS NOTES
Vascular surgery.    Fistulogram shows moderate central venous stenosis which was successfully treated with venoplasty.    Plan:  Keep right arm straight and elevated for 2 hours.  No lifting using right arm/hand for next 2 days.  May remove dressing at time of next dialysis and you fistula for dialysis.    Vascular service will sign off.  Please call for question or concern.

## 2023-07-02 NOTE — CONSULTS
"Date of Consultation:  7/2/2023    Patient: : Lily Nicole  MRN: [id number]    Referring Physician: Dr. Riggs      GI:Manpreet Lees M.D.     Reason for Consultation: In need of enteral nutrition, consult for possible PEG tube placement    History of Present Illness:   27-year-old female with end-stage renal disease, SLE, malnutrition, pulmonary hypertension on 5 L home O2, electrolyte disarray, inability to consume appropriate oral intake or tolerate nasogastric tube who has been referred to gastroenterology for consideration of percutaneous gastrostomy tube placement.  Patient is recently just had some pain medication and is somnolent.  The mother states that the family has been in discussion about possible PEG tube placement.  She has been getting D10 for symptomatic hypoglycemia and has dropped her sodium to 116.  They are adjusting her fluids at this time to give her normal saline in an attempt to bring her sodium level up.  Palliative care has been involved.  Patient has been adamant in the past of not wanting nasogastric tube or percutaneous gastrostomy tube.      Past Medical History:   Diagnosis Date    Anemia 01/17/2018    ESRD (end stage renal disease) on dialysis ([facility]) 01/17/2018    Twan Fu    Hypertension 01/17/2018    \"Controlled with medication\"    Migraines 01/17/2018    Seizure ([facility]) 2013    from high blood pressure, reports 1 time event    AVF (arteriovenous fistula) ([facility])     Right Arm    Chest pain     Chest tightness     Daytime sleepiness     Dialysis patient ([facility])      dialysis, M,W,F [facility]    Difficulty breathing     Gasping for breath     Heart burn     Indigestion     Lupus ([facility])     Painful breathing     Palpitations     Shortness of breath     Swelling of lower extremity     Wheezing        Past Surgical History:   Procedure Laterality Date    CT UPPER GI ENDOSCOPY,DIAGNOSIS N/A 8/26/2022    Procedure: ESOPHAGOGASTRODUODENOSCOPY;  Surgeon: Parveen Wood M.D.;  Location: " [facility];  Service: Gastroenterology    NE UPPER GI ENDOSCOPY,CTRL BLEED N/A 8/26/2022    Procedure: GASTROSCOPY, WITH ARGON PLASMA COAGULATION;  Surgeon: Parveen Wood M.D.;  Location: [facility];  Service: Gastroenterology    NE BRONCHOSCOPY,DIAGNOSTIC Bilateral 5/13/2021    Procedure: BRONCHOSCOPY, BRONCHOALVEOLAR LAVAGE;  Surgeon: William Spangler M.D.;  Location: [facility];  Service: Pulmonary    NE UPPER GI ENDOSCOPY,DIAGNOSIS N/A 3/19/2021    Procedure: GASTROSCOPY;  Surgeon: Waylon Mcqueen M.D.;  Location: [facility];  Service: Gastroenterology    NE UPPER GI ENDOSCOPY,CTRL BLEED  3/19/2021    Procedure: GASTROSCOPY, WITH ARGON PLASMA COAGULATION;  Surgeon: Waylon Mcqueen M.D.;  Location: [facility];  Service: Gastroenterology    NE UPPER GI ENDOSCOPY,DIAGNOSIS  3/5/2021    Procedure: GASTROSCOPY - W/HEMOSTASIS;  Surgeon: Ej Silva M.D.;  Location: [facility];  Service: Gastroenterology    NE COLONOSCOPY,DIAGNOSTIC  1/8/2021    Procedure: COLONOSCOPY;  Surgeon: Herbert Contreras M.D.;  Location: [facility];  Service: Gastroenterology    NE UPPER GI ENDOSCOPY,DIAGNOSIS  1/8/2021    Procedure: GASTROSCOPY;  Surgeon: Herbert Contreras M.D.;  Location: [facility];  Service: Gastroenterology    NE UPPER GI ENDOSCOPY,CTRL BLEED  1/8/2021    Procedure: GASTROSCOPY, WITH ARGON PLASMA COAGULATION;  Surgeon: Herbert Contreras M.D.;  Location: [facility];  Service: Gastroenterology    NE UPPER GI ENDOSCOPY,CTRL BLEED  11/12/2020    Procedure: GASTROSCOPY, WITH ARGON PLASMA COAGULATION;  Surgeon: Gadiel Whitney M.D.;  Location: [facility];  Service: Gastroenterology    NE UPPER GI ENDOSCOPY,BIOPSY  11/12/2020    Procedure: GASTROSCOPY, WITH BIOPSY;  Surgeon: Gadiel Whitney M.D.;  Location: [facility];  Service: Gastroenterology    GASTROSCOPY-ENDO  11/12/2020    Procedure: GASTROSCOPY;  Surgeon:  "Gadiel Whitney M.D.;  Location: SURGERY [facility];  Service: Gastroenterology    GASTROSCOPY-ENDO  9/18/2020    Procedure: GASTROSCOPY;  Surgeon: Gadiel Whitney M.D.;  Location: SURGERY [facility];  Service: Gastroenterology    GASTROSCOPY N/A 5/30/2020    Procedure: GASTROSCOPY;  Surgeon: Waylon Mcqueen M.D.;  Location: SURGERY [facility];  Service: Gastroenterology    GASTROSCOPY-ENDO  12/9/2019    Procedure: GASTROSCOPY;  Surgeon: Aaron Kam M.D.;  Location: SURGERY [facility];  Service: Gastroenterology    ANGIOPLASTY  01/17/2018    \"Right Arm AV-Fistulagram & Angioplastyx3\"    ULI BY LAPAROSCOPY  4/5/2010    Performed by SYED MARTELL at SURGERY [facility]    AV FISTULA CREATION Right     OTHER      renal biopsy x 3    OTHER      bone marrow biopsy       Family History   Problem Relation Age of Onset    Diabetes Paternal Grandmother        Social History     Socioeconomic History    Marital status: Single   Tobacco Use    Smoking status: Never    Smokeless tobacco: Never   Vaping Use    Vaping Use: Never used   Substance and Sexual Activity    Alcohol use: No    Drug use: No     Social Determinants of Health     Financial Resource Strain: Low Risk  (3/8/2021)    Overall Financial Resource Strain (CARDIA)     Difficulty of Paying Living Expenses: Not hard at all   Food Insecurity: No Food Insecurity (3/8/2021)    Hunger Vital Sign     Worried About Running Out of Food in the Last Year: Never true     Ran Out of Food in the Last Year: Never true   Transportation Needs: No Transportation Needs (3/8/2021)    PRAPARE - Transportation     Lack of Transportation (Medical): No     Lack of Transportation (Non-Medical): No       Current Meds (name, sig, last dose):     Current Facility-Administered Medications:     sodium chloride 154 mEq in dextrose 10% 1,000 mL infusion    therapeutic multivitamin-minerals    losartan    senna-docusate **AND** polyethylene glycol/lytes " **AND** magnesium hydroxide **AND** bisacodyl    OLANZapine    thiamine    mycophenolate    hydroxychloroquine    predniSONE    escitalopram    folic acid    carvedilol    omeprazole    HYDROmorphone    zinc sulfate    promethazine    prochlorperazine    LORazepam **OR** LORazepam    diphenhydrAMINE    cloNIDine    hydrALAZINE    Notify MD and PharmD if FSBG level is less than or equal to 70 mg/dL or patient is showing signs/symptoms of hypoglycemia (tachycardia, palpitations, diaphoresis, clammy, tremulousness, nausea, confused) **AND** Administer 20 grams of glucose (approximately 8 ounces of fruit juice) every 15 minutes PRN FSBS less than 70 mg/dL **AND** dextrose bolus    ondansetron      ROS  10 systems reviewed and are negative unless otherwise noted in history of present illness.    Physical Exam:  Vitals:    07/01/23 1840 07/01/23 1936 07/02/23 0414 07/02/23 0800   BP: (!) 162/111 (!) 158/110 (!) 158/111 111/51   Pulse: 100 (!) 104 91 83   Resp: 18 20 15 16   Temp:  36.7 °C (98.1 °F) 36.7 °C (98.1 °F) (!) 38.2 °C (100.8 °F)   TempSrc:  Temporal Temporal Temporal   SpO2: 95% 92% 94% 96%   Weight:           Physical Exam  General: Chronically ill, somnolent,  HEENT: Dry mucous membranes, temporal wasting  Lungs: Clear bilaterally anteriorly.   Heart: S1S2 Regular rate. Pulses normal.  Abd: Soft, non-tender,   Neurologic: Somnolent.      Labs:  Recent Labs     06/29/23  1434 07/01/23  1138 07/02/23  0142   SODIUM 132* 125* 119*   POTASSIUM 3.8 4.3 4.0   CHLORIDE 95* 91* 87*   CO2 29 27 27   BUN 17 12 14   CREATININE 3.07* 2.52* 2.65*   MAGNESIUM  --  1.7  --    CALCIUM 8.5 7.9* 7.6*     Recent Labs     06/29/23  1434 06/30/23  0038 07/01/23  1138 07/02/23  0142   ALTSGPT 6  --  5  --    ASTSGOT 14  --  17  --    ALKPHOSPHAT 73  --  169*  --    TBILIRUBIN 0.4  --  0.4  --    LIPASE 24  --   --   --    PREALBUMIN  --  6.7*  --   --    GLUCOSE 89  --  103* 94     Recent Labs     06/29/23  1434 07/01/23  113  07/02/23  0142   WBC 3.9* 5.5 4.7*   NEUTSPOLYS 76.20* 87.00*  --    LYMPHOCYTES 16.30* 8.00*  --    MONOCYTES 5.20 3.80  --    EOSINOPHILS 0.50 0.50  --    BASOPHILS 1.00 0.20  --    ASTSGOT 14 17  --    ALTSGPT 6 5  --    ALKPHOSPHAT 73 169*  --    TBILIRUBIN 0.4 0.4  --      Recent Labs     06/29/23  1434 07/01/23  1138 07/02/23 0142   RBC 3.52* 3.45* 3.02*   HEMOGLOBIN 8.5* 8.3* 7.5*   HEMATOCRIT 28.5* 28.3* 25.0*   PLATELETCT 260 187 181     Recent Results (from the past 24 hour(s))   POCT glucose device results    Collection Time: 07/01/23 10:31 AM   Result Value Ref Range    POC Glucose, Blood 58 (L) 65 - 99 mg/dL   POCT glucose device results    Collection Time: 07/01/23 11:10 AM   Result Value Ref Range    POC Glucose, Blood 89 65 - 99 mg/dL   Comp Metabolic Panel    Collection Time: 07/01/23 11:38 AM   Result Value Ref Range    Sodium 125 (L) 135 - 145 mmol/L    Potassium 4.3 3.6 - 5.5 mmol/L    Chloride 91 (L) 96 - 112 mmol/L    Co2 27 20 - 33 mmol/L    Anion Gap 7.0 7.0 - 16.0    Glucose 103 (H) 65 - 99 mg/dL    Bun 12 8 - 22 mg/dL    Creatinine 2.52 (H) 0.50 - 1.40 mg/dL    Calcium 7.9 (L) 8.5 - 10.5 mg/dL    AST(SGOT) 17 12 - 45 U/L    ALT(SGPT) 5 2 - 50 U/L    Alkaline Phosphatase 169 (H) 30 - 99 U/L    Total Bilirubin 0.4 0.1 - 1.5 mg/dL    Albumin 1.8 (L) 3.2 - 4.9 g/dL    Total Protein 7.8 6.0 - 8.2 g/dL    Globulin 6.0 (H) 1.9 - 3.5 g/dL    A-G Ratio 0.3 g/dL   MAGNESIUM    Collection Time: 07/01/23 11:38 AM   Result Value Ref Range    Magnesium 1.7 1.5 - 2.5 mg/dL   CBC WITH DIFFERENTIAL    Collection Time: 07/01/23 11:38 AM   Result Value Ref Range    WBC 5.5 4.8 - 10.8 K/uL    RBC 3.45 (L) 4.20 - 5.40 M/uL    Hemoglobin 8.3 (L) 12.0 - 16.0 g/dL    Hematocrit 28.3 (L) 37.0 - 47.0 %    MCV 82.0 81.4 - 97.8 fL    MCH 24.1 (L) 27.0 - 33.0 pg    MCHC 29.3 (L) 32.2 - 35.5 g/dL    RDW 73.0 (H) 35.9 - 50.0 fL    Platelet Count 187 164 - 446 K/uL    MPV 9.4 9.0 - 12.9 fL    Neutrophils-Polys 87.00 (H)  44.00 - 72.00 %    Lymphocytes 8.00 (L) 22.00 - 41.00 %    Monocytes 3.80 0.00 - 13.40 %    Eosinophils 0.50 0.00 - 6.90 %    Basophils 0.20 0.00 - 1.80 %    Immature Granulocytes 0.50 0.00 - 0.90 %    Nucleated RBC 0.40 (H) 0.00 - 0.20 /100 WBC    Neutrophils (Absolute) 4.76 1.82 - 7.42 K/uL    Lymphs (Absolute) 0.44 (L) 1.00 - 4.80 K/uL    Monos (Absolute) 0.21 0.00 - 0.85 K/uL    Eos (Absolute) 0.03 0.00 - 0.51 K/uL    Baso (Absolute) 0.01 0.00 - 0.12 K/uL    Immature Granulocytes (abs) 0.03 0.00 - 0.11 K/uL    NRBC (Absolute) 0.02 K/uL   CORRECTED CALCIUM    Collection Time: 07/01/23 11:38 AM   Result Value Ref Range    Correct Calcium 9.7 8.5 - 10.5 mg/dL   ESTIMATED GFR    Collection Time: 07/01/23 11:38 AM   Result Value Ref Range    GFR (CKD-EPI) 26 (A) >60 mL/min/1.73 m 2   POCT glucose device results    Collection Time: 07/01/23 12:18 PM   Result Value Ref Range    POC Glucose, Blood 81 65 - 99 mg/dL   POCT glucose device results    Collection Time: 07/01/23  1:18 PM   Result Value Ref Range    POC Glucose, Blood 112 (H) 65 - 99 mg/dL   HCG QUAL SERUM    Collection Time: 07/01/23  3:44 PM   Result Value Ref Range    Beta-Hcg Qualitative Serum Negative Negative   POCT glucose device results    Collection Time: 07/01/23  5:07 PM   Result Value Ref Range    POC Glucose, Blood 42 (L) 65 - 99 mg/dL   POCT glucose device results    Collection Time: 07/01/23  5:46 PM   Result Value Ref Range    POC Glucose, Blood 22 (LL) 65 - 99 mg/dL   POCT glucose device results    Collection Time: 07/01/23  6:06 PM   Result Value Ref Range    POC Glucose, Blood 268 (H) 65 - 99 mg/dL   POCT glucose device results    Collection Time: 07/01/23  7:20 PM   Result Value Ref Range    POC Glucose, Blood 164 (H) 65 - 99 mg/dL   POCT glucose device results    Collection Time: 07/02/23  1:41 AM   Result Value Ref Range    POC Glucose, Blood 101 (H) 65 - 99 mg/dL   Basic Metabolic Panel    Collection Time: 07/02/23  1:42 AM   Result Value  Ref Range    Sodium 119 (LL) 135 - 145 mmol/L    Potassium 4.0 3.6 - 5.5 mmol/L    Chloride 87 (L) 96 - 112 mmol/L    Co2 27 20 - 33 mmol/L    Glucose 94 65 - 99 mg/dL    Bun 14 8 - 22 mg/dL    Creatinine 2.65 (H) 0.50 - 1.40 mg/dL    Calcium 7.6 (L) 8.5 - 10.5 mg/dL    Anion Gap 5.0 (L) 7.0 - 16.0   CBC WITHOUT DIFFERENTIAL    Collection Time: 07/02/23  1:42 AM   Result Value Ref Range    WBC 4.7 (L) 4.8 - 10.8 K/uL    RBC 3.02 (L) 4.20 - 5.40 M/uL    Hemoglobin 7.5 (L) 12.0 - 16.0 g/dL    Hematocrit 25.0 (L) 37.0 - 47.0 %    MCV 82.8 81.4 - 97.8 fL    MCH 24.8 (L) 27.0 - 33.0 pg    MCHC 30.0 (L) 32.2 - 35.5 g/dL    RDW 76.3 (H) 35.9 - 50.0 fL    Platelet Count 181 164 - 446 K/uL    MPV 9.0 9.0 - 12.9 fL   ESTIMATED GFR    Collection Time: 07/02/23  1:42 AM   Result Value Ref Range    GFR (CKD-EPI) 25 (A) >60 mL/min/1.73 m 2   POCT glucose device results    Collection Time: 07/02/23  5:28 AM   Result Value Ref Range    POC Glucose, Blood 78 65 - 99 mg/dL         Imaging:  DX-ABDOMEN FOR TUBE PLACEMENT  Narrative: 6/29/2023 6:45 PM    HISTORY/REASON FOR EXAM:  Tube placement    TECHNIQUE/EXAM DESCRIPTION AND NUMBER OF VIEWS:  1 view(s) of the abdomen.    COMPARISON:  6/27/2023    FINDINGS:  Enteric tube has been placed.    The tip projects over the descending duodenum.    The bowel gas pattern is within normal limits.  Impression: Feeding tube tip at the descending duodenum.        [facility] Data Review:  -Records reviewed and summarized in current documentation  -I personally reviewed and interpreted the laboratory results  -I personally reviewed the radiology images  -I have personally reviewed medications    Hospital Problem List:  Active Hospital Problems    Diagnosis     Chronic heart failure with reduced ejection fraction (HCC) [I50.20]     Hypoglycemia [E16.2]     Subclinical hypothyroidism [E03.8]     Pulmonary hypertension (HCC) [I27.20]     Systemic lupus erythematosus with organ system involvement (HCC)  [M32.10]     Hyponatremia [E87.1]     Severe protein-calorie malnutrition (HCC) [E43]     Anemia of chronic disease [D63.8]     Immunosuppression (HCC) [D84.9]     Chronic respiratory failure with hypoxia (HCC) [J96.11]     ESRD (end stage renal disease) (HCC) [N18.6]     Arteriovenous fistula, acquired (HCC) [I77.0]        Impressions:  27-year-old female with end-stage renal disease, pulmonary hypertension on home O2, severe protein calorie malnutrition, SLE.  Patient has been unable to tolerate nasogastric tube.  She has been adamant about avoiding any kind of enteral nutrition.  Her sodium is currently 116.  Patient is extremely high risk for endoscopic gastrostomy tube placement.  Currently she is not an appropriate candidate for sedation given her electrolyte disarray.  I did discuss with mom about possibly readdressing this issue in the coming 24 to 48 hours.      Recommendations:  Could consider peripheral parenteral nutrition in the interim  Await improvement in electrolyte disarray and optimization of patient prior to considering sedation.  Patient is currently too high risk for sedation.  We will stop back by the coming 24 to 48 hours to readdress possible PEG tube placement.  Agree with continued involvement of palliative care.  I would like for the patient and her mother to more carefully consider long-term goals of therapy prior to exposing her to the risk of sedation and the possible complications related to a gastrostomy tube.    Time spent in chart review, counseling, coordination of care: 45 min

## 2023-07-02 NOTE — OP REPORT
VASCULAR SURGERY OPERATIVE REPORT       DATE OF SERVICE:  07/01/2023     SURGEON:  Fadi Emerson MD     ANESTHESIA:  Local anesthesia with 2 mL of 1% lidocaine solution.     PREOPERATIVE DIAGNOSIS:  Right upper arm dialysis fistula bleeding secondary   to central venous stenosis.     POSTOPERATIVE DIAGNOSIS:  Right upper arm dialysis fistula bleeding secondary   to central venous stenosis.     PROCEDURES:  Right arm dialysis fistulogram and central venogram and venoplasty of   central venous stenosis.     INDICATIONS FOR PROCEDURE:  This is a 27-year-old female with multiple medical   problems including end-stage renal disease, on hemodialysis via right upper   arm arteriovenous fistula.  The patient has history of central venous   stenosis, was treated with venoplasty in the past.  She now developed   recurrent bleeding.  Discussion was made with the patient.  She would like to   undergo fistulogram with possible endovascular intervention, fully   understanding all risks.     DESCRIPTION OF PROCEDURE:  Informed consent was obtained.  The patient was   taken to interventional radiology suite and was placed in the supine position.    A timeout procedure was done.  She was given fentanyl for pain control.  Sedation was not done since patient had lunch.  Her   right upper extremity was sterilely prepped and draped in the normal fashion.    The fistula was percutaneously cannulated in the distal upper arm after the skin and subcutaneous tissue were anesthetized with 2ml of 1% Lidocaine solution.    Guidewire was passed through the needle.  The needle was removed and replaced   with a 6-[place] sheath.  A dialysis fistulogram and central venogram was   obtained.     The patient was given heparin 2000 units intravenously.  After the heparin was   allowed to circulate systemically for 3 minutes, over the guidewire,   percutaneous, transluminal venoplasty of the central venous stenosis was   performed using 10x40 mm  angioplasty balloon.  A followup venogram was   obtained and showed restoration of luminal patency with no significant recoiled   stenosis.  At this time, the fistula was no longer pulsatile.     The patient was given protamine 20 mg intravenously.  The 6-[place] sheath was   removed.  Hemostasis was achieved by placing a pursestring at the puncture   site using 2-0 nylon suture.     ESTIMATED BLOOD LOSS:  5 mL.     CONTRAST USED:  16 mL Visipaque.     COUNTS:  Sponge and instrument count was correct x2.     The patient was then taken to recovery area in stable condition with good   thrills over the fistula and intact neurovascular examination of the right   hand.        ______________________________  MD MINOR Sandoval/SAVANA/DIA    DD:  07/01/2023 17:07  DT:  07/01/2023 17:49    Job#:  [id number]

## 2023-07-02 NOTE — PROGRESS NOTES
Pt's blood glucose reading 15 minutes after termination of D10 bolus was 22. RRT called. Dr. Riggs notified and instructed this RN to administer a second bolus of D10 and an amp of D50. After administering a total of 50 gm D10 and the amp of D50, pt's blood glucose read 19 on the right hand. POC performed on the left and a reading of 414 was obtained. Dr. Riggs notified and an order for a STAT CMP was placed. Dr. Emerson notified via voalte and he advised against POC blood glucose checks on the right 2/2 some of the blood being diverted away from the hand/forearm because of the AV fistula. Continuing to monitor.     STAT CMP order discontinued as pt is alert and oriented. Pt has a q12hr BMP scheduled for 0130.

## 2023-07-02 NOTE — PROGRESS NOTES
Hospital Medicine Daily Progress Note    Date of Service  7/2/2023    Chief Complaint  hypoglycemia    Hospital Course  Lily Nicole is a 27 y.o. female with history of SLE, glomerulonephritis with ESRD on hemodialysis MWF, HFrEF, possible mixed connective tissue disease, severe malnutrition requiring NGT placement, hypoglycemia with seizure, hypertension, history of endocarditis bacteremia and cardiac arrest (November 2022), pulmonary hypertension, and chronic oxygen dependence (5 L), who presents to ED on 6/29/2023 for hypoglycemia and dizziness.  Apparently, her feeding tube supplies were not delivered to her home.  Notably she was deemed incapacitated to make medical or placement decisions by psychiatry during last hospitalization.  On evaluation, she is on 7 L of oxygen by nasal cannula and reports diffuse pain.  She states she does not want PEG tube placed.  She was hypoglycemic with BG of 55, and required several dextrose boluses.  She is started on IV D10 infusion.  Palliative care was consulted, and after goals of care discussion patient opted to remove NGT and does not want PEG tube placement.  Mother is surrogate decision maker when critical.  She is made DNR with trial of intubation.  She was attempted to be weaned off D10 infusion, however continued to have hypoglycemia.  Hospitalization notable for bleeding from AV fistula after a scab popped out.  Vascular surgery was consulted.      Interval Problem Update  7/2/2023 - I reviewed the patient's chart today. Uneventful night. VSS. Afebrile.  Remains on 5 L of oxygen by nasal cannula.  Hemoglobin 7.5.  Sodium dropped to 119 likely from hypotonic IVF.  I reviewed vascular recommendations and interventions, patient underwent fistulogram, central venogram and venoplasty of central venous stenosis.    > I have personally seen and examined the patient today.  She has diffuse body pains.  Nauseated.  No vomiting.  Had a long talk with her about her  being D10 dependent at this point, and she will need alternative form of nutrition.  She states she is open to PEG tube placement can be done, otherwise I told her that we might need to put the NGT back.  I spoke with GI to ask for formal consult again for PEG tube placement.    I personally reviewed all lab results mentioned above. Prior medical records from this institution and outside facilities were independently reviewed as noted. I also personally reviewed all consultant recommendations and plans as documented above. History was independently obtained by myself. I have discussed this patient's plan of care and discharge plan at IDT rounds today with Case Management, Nursing, Nursing leadership, and other members of the IDT team.      Consultants/Specialty  nephrology and palliative care    Code Status  DNAR, I OK    Disposition  The patient is not medically cleared for discharge to home or a post-acute facility.    Anticipate discharge to: Unknown - hospice vs post-acute vs home  I have placed the appropriate orders for post-discharge needs.    Review of Systems  ROS     Pertinent positives/negatives as mentioned above.     A complete review of systems was personally done by me. All other systems were negative.       Physical Exam  Temp:  [36.5 °C (97.7 °F)-36.7 °C (98.1 °F)] 36.6 °C (97.9 °F)  Pulse:  [] 95  Resp:  [14-20] 15  BP: (124-185)/() 148/102  SpO2:  [85 %-100 %] 100 %    Physical Exam  Vitals and nursing note reviewed.   Constitutional:       Appearance: She is cachectic. She is ill-appearing.   HENT:      Head: Normocephalic and atraumatic.      Right Ear: External ear normal.      Left Ear: External ear normal.      Mouth/Throat:      Mouth: Mucous membranes are dry.   Eyes:      Pupils: Pupils are equal, round, and reactive to light.   Cardiovascular:      Rate and Rhythm: Normal rate and regular rhythm.      Pulses: Normal pulses.      Heart sounds: Murmur heard.      Comments: Right  arm AV fistula, palpable thrill, (+) bleeding, pressure dressing applied  Pulmonary:      Effort: Pulmonary effort is normal. No respiratory distress.      Breath sounds: Normal breath sounds. No wheezing.   Abdominal:      General: Abdomen is flat.      Palpations: Abdomen is soft.      Tenderness: There is no abdominal tenderness. There is no guarding.   Musculoskeletal:         General: No swelling. Normal range of motion.      Cervical back: Normal range of motion and neck supple.      Right lower leg: No edema.      Left lower leg: No edema.   Skin:     General: Skin is dry.      Capillary Refill: Capillary refill takes less than 2 seconds.      Coloration: Skin is pale.   Neurological:      General: No focal deficit present.      Mental Status: Mental status is at baseline. She is lethargic.      Motor: Weakness and atrophy present.   Psychiatric:         Behavior: Behavior is slowed.         Thought Content: Thought content normal.         Judgment: Judgment normal.      Comments: Not very forthcoming       I have performed the physical examination today 7/2/2023.  In review of yesterday's note, there are no new changes except as documented above.      Fluids    Intake/Output Summary (Last 24 hours) at 7/2/2023 1151  Last data filed at 7/2/2023 1000  Gross per 24 hour   Intake 378.89 ml   Output 0 ml   Net 378.89 ml       Laboratory  Recent Labs     06/29/23  1434 07/01/23  1138 07/02/23  0142   WBC 3.9* 5.5 4.7*   RBC 3.52* 3.45* 3.02*   HEMOGLOBIN 8.5* 8.3* 7.5*   HEMATOCRIT 28.5* 28.3* 25.0*   MCV 81.0* 82.0 82.8   MCH 24.1* 24.1* 24.8*   MCHC 29.8* 29.3* 30.0*   RDW 71.8* 73.0* 76.3*   PLATELETCT 260 187 181   MPV 9.8 9.4 9.0     Recent Labs     07/01/23  1138 07/02/23  0142 07/02/23  0955   SODIUM 125* 119* 116*   POTASSIUM 4.3 4.0 4.4   CHLORIDE 91* 87* 83*   CO2 27 27 25   GLUCOSE 103* 94 68   BUN 12 14 15   CREATININE 2.52* 2.65* 2.92*   CALCIUM 7.9* 7.6* 8.0*                   Imaging  DX-ABDOMEN FOR  TUBE PLACEMENT   Final Result      Feeding tube tip at the descending duodenum.      IR-EXTREMITY ANGIOGRAM-UNILATERAL RIGHT    (Results Pending)        Assessment/Plan  * Hypoglycemia- (present on admission)  Assessment & Plan  -brittle hypoglycemia, likely from poor oral intake.  She does not want to go back to NGT feeding.  She refused PEG placement or even TPN in the past.  -TSH and cortisol are normal.  -Tried weaning off D10 infusion, but became hypoglycemic.  -Continue IV D10 but changed to IV D10 NS given her hyponatremia.   -Continue oral diet, supplement with boost plus.  Monitor BGs every 4 hours.  If BG is better, trial of weaning off D10 infusion again.  -Continue on hypoglycemia protocol.   -I discussed with her that she is essentially IV dextrose dependent, and will need alternative for feeding to keep blood glucose up without IV dextrose infusion.  I will consult GI for consideration of PEG tube placement.    Hyponatremia- (present on admission)  Assessment & Plan  -Acute on chronic, sodium 119, likely from hypotonic IVF solution (D10).  -Change IVF to D10 NS.  Monitor BMP every 4 hours, as needed very slow correction of sodium no more than 5-6mmol/12 hours.     Arteriovenous fistula, acquired (HCC)- (present on admission)  Assessment & Plan  - Had bleeding after a scab popped off.  Now s/p fistulogram, central venogram and venoplasty of central venous stenosis.  -Monitor for any bleeding.    Chronic heart failure with reduced ejection fraction (HCC)- (present on admission)  Assessment & Plan  -LVEF 45% on echo 6/18/23  -Compensated, does not appear to be in fluid overload.  -Continue Coreg, Cozaar.    Subclinical hypothyroidism- (present on admission)  Assessment & Plan  -TSH:9.130  -Monitor outpatient    Pulmonary hypertension (HCC)- (present on admission)  Assessment & Plan  -5L O2 baseline. Due to MCTD and and lupus.    Systemic lupus erythematosus with organ system involvement (HCC)- (present on  admission)  Assessment & Plan  -Continue chronic medications.  Follows up with outpatient rheumatologist.      Severe protein-calorie malnutrition (HCC)- (present on admission)  Assessment & Plan  -Previously on enteral feeding by NG tube.  Does not want further NG tube feeding.  -Palliative care on board.  -Does not want PEG placement, but will need to consider re-evaluation for a G-tube as persistently hypoglycemic.    Anemia of chronic disease, with acute blood loss anemia- (present on admission)  Assessment & Plan  - Related to ESRD, autoimmune disorders, with component of acute blood loss anemia from bleeding AVF.  - Continue to monitor.  Restrictive transfusion strategy.    Immunosuppression (HCC)- (present on admission)  Assessment & Plan  -Due to immunosuppresive medication for lupus    Chronic respiratory failure with hypoxia (HCC)- (present on admission)  Assessment & Plan  -Due to history of pulmonary hypertension.  At baseline 5 L oxygen.    -Continue respiratory support, RT protocol, supplemental oxygen.    ESRD (end stage renal disease) (HCC)- (present on admission)  Assessment & Plan  -On HD M,W,F  -Nephrology following.         VTE prophylaxis: SCDs/TEDs

## 2023-07-02 NOTE — PROGRESS NOTES
Nephrology Daily Progress Note    Date of Service  7/2/2023    Chief Complaint  27 y.o. female admitted 6/29/2023 with hypoglycemia, failure to thrive.    Interval Problem Update  Patient is complaining of generalized weakness, myalgia.  She has decreased appetite and poor oral intake, she refused NG tube    Review of Systems  Review of Systems   Constitutional:  Positive for malaise/fatigue. Negative for chills and fever.   Respiratory:  Negative for cough and shortness of breath.    Cardiovascular:  Negative for chest pain and leg swelling.   Gastrointestinal:  Negative for nausea and vomiting.   Genitourinary:  Negative for dysuria, frequency and urgency.   Musculoskeletal:  Positive for myalgias.        Physical Exam  Temp:  [36.5 °C (97.7 °F)-36.7 °C (98.1 °F)] 36.6 °C (97.9 °F)  Pulse:  [] 95  Resp:  [14-20] 15  BP: (124-185)/() 148/102  SpO2:  [85 %-100 %] 100 %    Physical Exam  Vitals and nursing note reviewed.   Constitutional:       General: She is not in acute distress.     Appearance: She is cachectic. She is ill-appearing. She is not diaphoretic.   HENT:      Head: Normocephalic and atraumatic.      Right Ear: External ear normal.      Left Ear: External ear normal.      Nose: Nose normal.   Eyes:      General: No scleral icterus.        Right eye: No discharge.         Left eye: No discharge.      Conjunctiva/sclera: Conjunctivae normal.   Cardiovascular:      Rate and Rhythm: Normal rate and regular rhythm.      Heart sounds: No murmur heard.  Pulmonary:      Effort: Pulmonary effort is normal. No respiratory distress.      Breath sounds: Normal breath sounds.   Musculoskeletal:         General: No tenderness.      Right lower leg: No edema.      Left lower leg: No edema.   Skin:     General: Skin is warm and dry.      Findings: No erythema.   Neurological:      General: No focal deficit present.      Mental Status: She is alert and oriented to person, place, and time.      Cranial  Nerves: No cranial nerve deficit.   Psychiatric:         Mood and Affect: Mood normal.         Behavior: Behavior normal.         Fluids    Intake/Output Summary (Last 24 hours) at 7/2/2023 1314  Last data filed at 7/2/2023 1000  Gross per 24 hour   Intake 304.53 ml   Output 0 ml   Net 304.53 ml       Laboratory  Recent Labs     06/29/23  1434 07/01/23  1138 07/02/23  0142   WBC 3.9* 5.5 4.7*   RBC 3.52* 3.45* 3.02*   HEMOGLOBIN 8.5* 8.3* 7.5*   HEMATOCRIT 28.5* 28.3* 25.0*   MCV 81.0* 82.0 82.8   MCH 24.1* 24.1* 24.8*   MCHC 29.8* 29.3* 30.0*   RDW 71.8* 73.0* 76.3*   PLATELETCT 260 187 181   MPV 9.8 9.4 9.0     Recent Labs     07/01/23  1138 07/02/23  0142 07/02/23  0955   SODIUM 125* 119* 116*   POTASSIUM 4.3 4.0 4.4   CHLORIDE 91* 87* 83*   CO2 27 27 25   GLUCOSE 103* 94 68   BUN 12 14 15   CREATININE 2.52* 2.65* 2.92*   CALCIUM 7.9* 7.6* 8.0*         No results for input(s): NTPROBNP in the last 72 hours.        Imaging  DX-ABDOMEN FOR TUBE PLACEMENT   Final Result      Feeding tube tip at the descending duodenum.      IR-EXTREMITY ANGIOGRAM-UNILATERAL RIGHT    (Results Pending)         Assessment/Plan  1 ESRD  2 hypoglycemia   3 failure to thrive  4 hyponatremia secondary to: IV fluid/D10  no acute need for HD today   please mix all IV fluid with normal saline instead of water  free water restriction  Serial sodium level check  Renal diet  Daily BMP, CBC.  Renal dose all meds  Avoid nephrotoxins like NSAIDs.  Prognosis poor.  I would recommend palliative consult and transitioning into comfort care only measures  Plan discussed with Dr. Riggs

## 2023-07-03 PROBLEM — Z66 DNR (DO NOT RESUSCITATE): Status: ACTIVE | Noted: 2023-01-01

## 2023-07-03 NOTE — CARE PLAN
The patient is Watcher - Medium risk of patient condition declining or worsening    Shift Goals  Clinical Goals: Monitor glucose, manage pain/anxiety  Patient Goals: Pain/anxiety control  Family Goals: comfort    Progress made toward(s) clinical / shift goals:      Patient emotional and crying throughout the night, complaining of anxiety, itchiness and generalized pain. Keeps verbalizing she feels miserable, cannot get comfortable.     Mary TABARES started us guided IV but both access were infiltrated a few hours later. Left IJ was inserted by ciara RN.   Na 115 this AM. IV fluids rate increased. Given d10 x1 for sugar of 46. Went up to 126. Repeat sugar q4h later was 70. D10x1 started during transfer to [facility]. X1 IV dilaudid administered for generalized pain. Report given to Harjit MARTINEZ.     Patient is not progressing towards the following goals:

## 2023-07-03 NOTE — PROCEDURES
VAT RNs at bedside to place PICC line. PICC was approved by nephrologist (per ordering MD Riggs). Unable to place PICC at this time due to inadequate vasculature. Pt is left arm use only. Left basilic vein is occluded 100% for a 4F PICC, Brachial is non compressible, therefore not usable. Cephalic vein on left side is also over 100% occluded with 4F. Charge RN aware. Plan is to place a femoral line.

## 2023-07-03 NOTE — DISCHARGE PLANNING
Care Transition Team Assessment    RNCM completed assessment with information obtained from chart review. Patient was recently admitted to [facility] and Dc'd home on 6/28. Patient was set up with [facility] and O2 with [facility]. However, NG tube feeds were not set up and patient was readmitted due to hyperglycemia. Patient is also on service with [facility] for Dialysis M-W-F. Patient no longer has NG tube in place and is not a candidate for a PEG. Patient declined palliative/hospice last hospitalization. Patient will likely need GMT transportation home. HCM will continue to follow for any discharge planning needs.     Information Source  Orientation Level: Oriented X4  Information Given By:  (EMR)  Informant's Name: Chikis  Who is responsible for making decisions for patient? : Other (Mother-Chikis)    Readmission Evaluation  Is this a readmission?: Yes - unplanned readmission    Elopement Risk  Legal Hold: No  Ambulatory or Self Mobile in Wheelchair: No-Not an Elopement Risk  Disoriented: No  Psychiatric Symptoms: None  History of Wandering: No  Elopement this Admit: No  Vocalizing Wanting to Leave: No  Displays Behaviors, Body Language Wanting to Leave: No-Not at Risk for Elopement  Elopement Risk: Not at Risk for Elopement    Interdisciplinary Discharge Planning  Lives with - Patient's Self Care Capacity: Parents  Patient or legal guardian wants to designate a caregiver: No  Support Systems: Family Member(s)  Housing / Facility: [address]  Durable Medical Equipment: Home Oxygen    Discharge Preparedness  What is your plan after discharge?: Home health care, Home with help  What are your discharge supports?: Parent  Prior Functional Level: Needs Assist with Activities of Daily Living, Needs Assist with Medication Management  Difficulity with ADLs: None  Difficulity with IADLs: Driving, Managing medication    Functional Assesment  Prior Functional Level: Needs Assist with Activities of Daily Living,  Needs Assist with Medication Management    Finances  Financial Barriers to Discharge: No  Prescription Coverage: Yes    Vision / Hearing Impairment  Vision Impairment : No  Hearing Impairment : No         Advance Directive  Advance Directive?: None    Domestic Abuse  Have you ever been the victim of abuse or violence?: No  Physical Abuse or Sexual Abuse: No  Verbal Abuse or Emotional Abuse: No  Possible Abuse/Neglect Reported to:: Not Applicable    Psychological Assessment  History of Substance Abuse: None  History of Psychiatric Problems: No  Non-compliant with Treatment: No  Newly Diagnosed Illness: No    Discharge Risks or Barriers  Discharge risks or barriers?: Transportation, Complex medical needs  Patient risk factors: Complex medical needs, Readmission    Anticipated Discharge Information  Discharge Disposition: Discharged to home/self care (01)

## 2023-07-03 NOTE — PROGRESS NOTES
Nephrology Progress Note    Date of Service  7/3/2023    Referring Physician  Herbert Gonzalez M.D.    Consulting Physician  Digna Millan M.D.    Reason for Consultation  Dialysis needs    History of Presenting Illness  27 y.o. female who presented 6/29/2023 with stage renal disease on hemodialysis who presents with failure to thrive, hypoglycemia.      Subjective overnight- Noted to have sodium of 113.  Anticipated HD planned for today.      Review of Systems  ROS    Past Medical His   Family      Medications  Current Facility-Administered Medications   Medication Dose Route Frequency Provider Last Rate Last Admin    HYDROmorphone (Dilaudid) injection 1 mg  1 mg Intravenous Q4HRS PRN Mary Mtz A.P.R.N.   1 mg at 07/03/23 0407    sodium chloride 154 mEq in dextrose 10% 1,000 mL infusion   Intravenous Continuous Herbert Gonzalez M.D. 125 mL/hr at 07/03/23 1107 New Bag at 07/03/23 1107    sodium chloride (Salt) tablet 1 g  1 g Oral BID Olu Riggs M.D.   1 g at 07/03/23 0923    diphenhydrAMINE (Benadryl) tablet/capsule 25 mg  25 mg Oral Once Mary Mtz A.P.R.N.        therapeutic multivitamin-minerals (Theragran-M) tablet 1 Tablet  1 Tablet Oral DAILY Mary Mtz, A.P.R.N.   1 Tablet at 07/02/23 0517    losartan (Cozaar) tablet 25 mg  25 mg Oral DAILY Mary Mtz A.P.R.N.   25 mg at 07/03/23 0905    senna-docusate (Pericolace Or Senokot S) 8.6-50 MG per tablet 2 Tablet  2 Tablet Oral BID Mary Mzt A.P.R.N.   2 Tablet at 07/02/23 0517    And    polyethylene glycol/lytes (Miralax) PACKET 1 Packet  1 Packet Oral QDAY PRN Mary Mtz A.P.R.N.        And    magnesium hydroxide (Milk Of Magnesia) suspension 30 mL  30 mL Oral QDAY PRN Mary Mtz A.P.R.N.        And    bisacodyl (Dulcolax) suppository 10 mg  10 mg Rectal QDAY PRN AMARILIS MarinoRKATY        OLANZapine (ZyPREXA) tablet 2.5 mg  2.5 mg Oral Q EVENING JOSE EDUARDO Marino.    2.5 mg at 07/02/23 1820    thiamine (Vitamin B-1) tablet 100 mg  100 mg Oral DAILY GETACHEW Marino.P.R.N.   100 mg at 07/02/23 0517    mycophenolate (Cellcept) 200 MG/ML susp 500 mg  500 mg Oral QAM Mary Mtz A.P.R.N.        hydroxychloroquine (Plaquenil) tablet 200 mg  200 mg Oral QAM GETACHEW Marino.P.R.N.   200 mg at 07/02/23 0517    predniSONE (Deltasone) tablet 5 mg  5 mg Oral DAILY GETACHEW Marino.P.R.N.   5 mg at 07/03/23 0922    escitalopram (Lexapro) tablet 10 mg  10 mg Oral DAILY GETACHEW Marino.P.R.N.   10 mg at 07/03/23 0922    folic acid (Folvite) tablet 1 mg  1 mg Oral DAILY GETACHEW Marino.P.R.N.   1 mg at 07/03/23 0905    carvedilol (Coreg) tablet 3.125 mg  3.125 mg Oral BID WITH MEALS GETACHEW Marino.P.R.N.   3.125 mg at 07/03/23 0922    omeprazole (PriLOSEC) capsule 20 mg  20 mg Oral DAILY Mary Mtz A.P.R.N.   20 mg at 07/03/23 0906    zinc sulfate (Zincate) capsule 220 mg  220 mg Oral DAILY GETACHEW Marino.P.R.N.   220 mg at 07/03/23 0904    promethazine (Phenergan) tablet 25 mg  25 mg Oral Q6HRS PRN Olu Riggs M.D.   25 mg at 07/01/23 1106    prochlorperazine (Compazine) injection 10 mg  10 mg Intravenous Q4HRS PRN Olu Riggs M.D.   10 mg at 07/01/23 1137    LORazepam (Ativan) injection 0.5 mg  0.5 mg Intravenous Q6HRS PRN Olu Riggs M.D.   0.5 mg at 07/03/23 0909    Or    LORazepam (Ativan) tablet 0.5 mg  0.5 mg Oral Q6HRS PRN Olu Riggs M.D.        diphenhydrAMINE (Benadryl) injection 25 mg  25 mg Intravenous Q6HRS PRN GAUTAM Marino.RJgN.   25 mg at 07/03/23 0142    cloNIDine (Catapres) 0.2 MG/24HR patch 1 Patch  1 Patch Transdermal Q7 DAYS ZULEIKA ShultzOJg   1 Patch at 06/29/23 2117    hydrALAZINE (Apresoline) injection 10 mg  10 mg Intravenous Q4HRS PRN ZULEIKA ShultzOJg   10 mg at 07/01/23 1722    dextrose 10 % BOLUS 25 g  25 g Intravenous Q15 MIN PRN ZULEIKA ShultzOJg 999 mL/hr at  07/03/23 0949 25 g at 07/03/23 0949    ondansetron (Zofran) syringe/vial injection 4 mg  4 mg Intravenous Q4HRS PRN BRIANNA KamP.RJgNJg   4 mg at 07/03/23 0928       Allergies  Allergies   Allergen Reactions    Cephalexin Rash     Nausea and rash   Hives  .    Clindamycin Rash     Nausea and rash     Hive  .    Hydrocodone Rash and Nausea     Rash      Maxipime [Cefepime] Itching    Methylprednisolone Unspecified     Anxious      Tolerates prednisone     Metoprolol Rash and Nausea     Nausea and rash     Tolerates antenalol          Diagnostic X-Ray Materials Itching     Per patient's mother    Furosemide      Other reaction(s): Unknown-Explain in Comments  Unable to obtain at this time    Hydroxyzine Hcl Rash    Insulin      Other reaction(s): Other-Reaction in Comments  Patient is very sensitive to insulin administration, especially when treating hyperkalemia.  Has a hx of blood glucose 12.  Closely monitor for severe hypoglycemia that could precipitate seizures. If it's required, then give less insulin/more dextrose to prevent such hypoglycemic episodes.    9/25/2022   Verified by Dr. Kelsie Emerson (endocrinoligy)    Compazine Anxiety    Fentanyl And Related Anxiety    Metoclopramide Anxiety    Tape Rash     Paper tape is ok       Physical Exam  Temp:  [36.3 °C (97.3 °F)-36.5 °C (97.7 °F)] 36.3 °C (97.3 °F)  Pulse:  [] 90  Resp:  [14-21] 20  BP: (109-159)/() 148/94  SpO2:  [71 %-100 %] 100 %    Physical Exam  GEN: alert and oriented in no acute distress.chronically ill lappearing, pale, thin young woman.    HEENT: dry oropharygneal mucous membranes and lips  CV:RRR, normal s1 s2  PULM: clear to auscultation bilaterally  ABD: soft non tender non distended  EXT: warm well perfused, no lower extremity edema   Fluids  Date 07/03/23 0700 - 07/04/23 0659   Shift 7952-2304 4069-1414 1686-9029 24 Hour Total   INTAKE   Blood 295   295   IV Piggyback 839.8   839.8   Shift Total 1134.8   1134.8    OUTPUT   Shift Total       Weight (kg) 51.6 51.6 51.6 51.6       Laboratory  Labs reviewed, pertinent labs below.  Recent Labs     07/01/23  1138 07/02/23  0142 07/03/23  0452   WBC 5.5 4.7* 4.6*   RBC 3.45* 3.02* 2.59*   HEMOGLOBIN 8.3* 7.5* 6.4*   HEMATOCRIT 28.3* 25.0* 20.9*   MCV 82.0 82.8 80.7*   MCH 24.1* 24.8* 24.7*   MCHC 29.3* 30.0* 30.6*   RDW 73.0* 76.3* 74.2*   PLATELETCT 187 181 173   MPV 9.4 9.0 9.8     Recent Labs     07/02/23  1612 07/03/23  0142 07/03/23  0718   SODIUM 117* 115* 113*   POTASSIUM 4.6 4.1 4.0   CHLORIDE 86* 83* 85*   CO2 25 19* 21   GLUCOSE 128* 61* 141*   BUN 15 18 18   CREATININE 2.96* 3.24* 3.24*   CALCIUM 7.6* 7.2* 7.1*                URINALYSIS:  Lab Results   Component Value Date/Time    COLORURINE Yellow 02/15/2021 1108    CLARITY Clear 02/15/2021 1108    SPECGRAVITY 1.015 02/15/2021 1108    PHURINE 8.5 (A) 02/15/2021 1108    KETONES Negative 02/15/2021 1108    PROTEINURIN 30 (A) 02/15/2021 1108    BILIRUBINUR Negative 02/15/2021 1108    UROBILU 0.2 07/16/2020 0900    NITRITE Negative 02/15/2021 1108    LEUKESTERAS Negative 02/15/2021 1108    OCCULTBLOOD Trace (A) 02/15/2021 1108     UPC  Lab Results   Component Value Date/Time    TOTPROTUR 45.0 (H) 07/16/2020 0900      Lab Results   Component Value Date/Time    CREATININEU 18.93 07/16/2020 0900       Imaging interpreted by radiologist. Imaging reports reviewed with pertinent findings below  DX-ABDOMEN FOR TUBE PLACEMENT   Final Result      Feeding tube tip at the descending duodenum.      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 Trent  - Recommend routine hemodialysis for maintenance clearance and volume needs  - Recommend HD on 07/03/23. Anticipate next HD on 07/05/23.   - Dose all medications for patient dialysis, renal function  - Avoid nephrotoxic agents  - Obtain phosphate level  twice weekly.  - Await Palliative care recommendations.      2. Hyponatermia - Encourage po intake as much as tolerated. Avoid salt tablets. Recommend  nromal saline based medications, infusions. Continue IVF with sodium choride in D10% Continue to be addressed with low sodium bath 130 with HD. Continue to monitor.    3. HTN -Continue coreg 3.125 mg BID, Losartan 25 mg daily. Clonidine 0.2 mg patch.     4. SLE - Continue hydroxycholoroquine. Cellcept.         Digna Millan MD  Nephrology  [facility]          Attending Discharge Physical Examination:

## 2023-07-03 NOTE — PROGRESS NOTES
HD treatment ordered by Dr Millan started at 1158 and ended at 1458 with net UF of 1005 ml as ordered. Prior to hd treatment, pt refused HD until lidocaine was given. Called Dr Millan for order, put order in and waited for pharmacist to verify. Cannulated right upper arm AVF x 2 using 16 gauge needles without problem.held sites for 20 minutes post treatment with no break through. Got 1 unit of packed RBC with HS and no adverse reaction noted. See flow sheet for details.

## 2023-07-03 NOTE — PROGRESS NOTES
Pulmonary and Critical Care Medicine Progress Note    This lady will be transferred to [facility] due to dropping sodium.  She has failure to thrive and hypoglycemia.  She was placed on a D10 infusion from 6/30/2023 until 0900 hours on 7/2/2023, when she was switched to D10-NS.  She has received IV boluses of dextrose solution for hypoglycemia.  Nephrology is on board and I hope they consider HD later today.    Rivera Carrion MD  Pulmonary and Critical Care Medicine

## 2023-07-03 NOTE — DISCHARGE PLANNING
HTH/SCP chart review completed. Note, due to medical acuity, pt is now requiring ICU level of care. TCN services are not indicated at this time and therefore will defer to hospital CM. TCN will monitor pt for transfer to another floor or can be reached by Voalte if collaboration with the hospital discharge team is indicated. Thank you!

## 2023-07-03 NOTE — PROGRESS NOTES
"RN contacted rapid and charge nurse on concerns for patients increased lethargy and decline and need for rapid.   per charge and rapid RN to call Betina to bedside- RN unable to call and only able to volate.     RN messaged Betina:  \"you are needed at patient bedside to assess patient, she is having increased lethargicness not responding to voice stimulation, she needs more IV access. I reached out to rapid and cahrge and we have concerns and need you at bedside\"    MD responded to give d5- RN explained that there are no orders in for d5 pushes and that she is getting the d10 bags. Md mentioned stat head CT which was then withdrawn. Care passed onto oncaleida FERRO- Carlos.     Carlos at bedside with charge RN x2 and both oncoming nurses. Pt to get dialysis and central line placement.  "

## 2023-07-03 NOTE — PROGRESS NOTES
Inpatient Palliative Medicine - Progress Note     Lily Nicole  27 y.o. female  [id number]     Location: [facility]  PCP: Ella Matthew M.D.  Referral Source: Sandra Gavin     Reason for palliative medicine consultation and/or visit: advance care planning        Assessment and Plan:     GOAL(S) OF CARE = Longevity for now, pending family meeting later this week to discuss hospice/end of life  AMENABLE to: dialysis, transfusions, medications, transfusions, PEG if deemed eligible (questionable eligibility at present)  NOT amenable to: NGT reinsertion     SYMPTOMS ETIOLOGY/CAUSES = fatigue, nausea, anxiety     PROGNOSIS = POOR, weeks-to-months even with repeated aggressive interventions and dialysis  Will become immediate hospital eligible when family are ready to stop dialysis..    CODE STATUS = DNAR, DNI  7/3/23 - further clarified with patient's mother          ADVANCE CARE PLANNING =   Reviewed clinical updates.  Prognosticated and reviewed the negative, non-beneficial care cycle with patient's mom.  Planned for a family meeting later this week to discuss hospice/end of life care.     PALLIATIVE CARE TEAM INTERVENTIONS =   Advance care planning.  2. Emotional support.  3. Place spiritual care order for  visit.  4. Will follow up later this week to have family meeting regarding end of life care / hospice.      Summary:     @ 1000  Patient was lethargic, resting in bed, and awaiting dialysis.  She only nodded when I called out to her.    I met with patient's mother Chikis bedside, as dialysis tech was about to start HD.  We reviewed important events last 3 days to catch up: mainly patient was in a better mood Friday & Saturday and did try to eat more orally naturally.  Unfortunately her intake remained insufficient, and her blood vessels have remained fairly occluded making central lines difficult.  Patient has also been anemic and requiring repeat transfusions.    I brought up patient's  "poor prognosis with mom bedside.  My main points:  I foresaw these new nutritional issues last week, hence I asked patient at least 3 times prior to NGT removal, and her response was she wanted it off even if it would risk her life.  Mom also recalled perfectly.  Patient's overall function seems too frail to allow for safe sedation... this may remain a barrier to PEG, even if pt agrees.  As important as longevity, living in a way agreeable to patient is also important.  Informed mom that as worried as I was last Friday removing patient's NGT, I was also very pleased by how happy she was... that she at least spent a few days living the way she wanted.  Mom agreed with me here.  Reiterated the same point that patient likely is in her final weeks of life, because she is not able to live the way she wanted and stay safe.... Reiterated the importance of giving patient some pleasure back to live closer to a way she prefers.  Reiterated that we are working on the same clinical issues this time, as before.  Reiterated that likely nothing we can do medically now will change patient's health dramatically to lengthen her prognosis, and it will also entail repeated admissions and ICU trips in and out.  Mom agreed with my assessment.  Inquired about family members who might want to visit patient one more time.  Mom noted some family members in [place], [place].  Inquired if mom would like her entire family to engage in family meeting to discuss hospice/end of life transition, she indicated she would want that as this is too big a decision for her to make on her own, and will check with [place] family members for their availabilities this week.  Re-clarified code status.  Chikis does NOT think patient would like the sensation of intubation, since she already \"hates\" the NGT.  We agreed to further advance to a DNAR DNI status, which seems to Chikis would be best aligned with patient's preferences.    Minerva was tearful at times " but appeared overall calm throughout.  She expressed no surprise to patient's ongoing clinical issues...  She stated that while she was sad at the thought of patient's approaching death, she recognized patient's suffering and how patient is not living a way she would have wanted either.    Minerva and I will follow up tomorrow on when this family meeting can take place, to discuss hospice/end of life care.    Chikis also requests a  to come pray with patient when possible.         Advance care planning:  The patient and/or legal decision maker has provided voluntary consent to discuss advance care planning. We discussed code status.  DNAR DNI    Total time spent in ACP discussion 30 minutes, which is separate from the time spent completing the evaluation and management visit.     Thank you for allowing me the opportunity to participate in the care of Lily Nicole     I spent a total of 35 minutes reviewing medical records, direct face-to-face time with the patient and/or family, documentation and coordination of care. This is separate from the time spent on advance care planning, which is documented above.         DALILA OKEEFE DO (TIM)  [facility]   [id number] [place]  [id number]  P: [phone]  F: [phone]  C: [phone]

## 2023-07-03 NOTE — PROGRESS NOTES
NOC APRN CROSS COVER NOTE      I was initially contacted earlier this evening regarding loss of IV access.  Patient is extremely difficult to place a PIV in due to stenosis and calcification of vasculature.  She has been requiring multiple and frequent D10 boluses which has made maintaining PIV access extremely difficult.    Both vascular surgery and the intensivist team has been contacted by dayshift and night shift providers regarding need for line.  We will have to wait for VAT team in the a.m. for possible PICC or midline placement.    I personally placed an ultrasound-guided PIV.  This was able to be maintained for approximately 6 hours.  I have spoken with the rapid response RN.  Upon her evaluation and mine, there does not appear to be more than 1 appropriate vein for ultrasound-guided placement and this vein is only mildly compressible.  I have instructed the RRT RN to reach out to the emergency department to see if there is a certified trauma RN who can place an EJ.  This was placed successfully by ER RN to the left EJ with the assistance of RRT RN, primary RN and myself.     Morning BMP has now resulted with sodium downtrending to 115.  Patient remains hypoglycemic.  I have adjusted her continuous IV fluid to 100 mL/h.  Hopefully, with adequate IV access BMP will improve.      I have reached out to Dr. Walton for consideration of IMCU placement, given frequent intervention required for hypoglycemia and her continued downtrending sodium. He will graciously accept patient in transfer. I have spoken with Dr. Moffett, IMCU cross cover to update him on transfer as well.    Next BMP due at 0730.     Recommend possible port placement if patient agreeable.       Mary Mtz ACNPC-AG, NOC Hospitalist RAYSA

## 2023-07-03 NOTE — PROGRESS NOTES
0953  FSBG =69.  Dextrose 10% Bolus 25g started.   1015 FSBG =68 Dr Gonzalez updated at bedside ,new order obtained during rounds.

## 2023-07-03 NOTE — PROGRESS NOTES
Attempt made to place ultrasound IV in left forearm. Veins are very stenotic and not easily compressible. IV not successful. Attempt made to reach out to critical care ARNP and was also unsuccessful. Prior in shift, ARNP Mary MAYES Was able to place one after attempts by previous nurses. However this IV also infiltrated as had the others.        Charge nurse from ER was consulted for certified RN to place EJ. Edy RN was able to successfully place a left sided 18 G EJ. Precautions and s/s to watch for for infiltration were discussed with primary nurse by Edy.        [facility] Rapid nurse to continue to follow.

## 2023-07-03 NOTE — PROGRESS NOTES
- called by RN to update that patient lost IV access and is a difficult stick. She requires D10 infusion for persistent hypoglycemia due to poor oral intake.   - I called the intensivists to see if they can help place a central line for IV access and frequent blood draws. Unfortunately, they are unable to due to concern for central venous stenosis and ESRD and has deferred to vascular surgery. I reached out to Dr. Emerson who states that there is nothing special that he can do differently, and a left IJ or femoral line can be placed if needed. I reached out to Dr. Najjar (Nephrology), who states that although not optimal, will be ok to place PICC or midline. Unfortunately, vascular access team is not available tonight and will not be able to place line until tomorrow.   - discussed with nursing, BG now in the 90s off D10 infusion. Patient is encouraged to eat and consume oral carbohydrates. I also started her on salt tablets 1 g BID. Nephrology states that BMP can be checked every 6 hours.     Cumulative time spent on managing medical issues today, 7/2/2023, is 150 minutes.

## 2023-07-03 NOTE — PROGRESS NOTES
"Hospital Medicine Daily Progress Note    Date of Service  7/3/2023    Chief Complaint  Lily Nicole is a 27 y.o. female admitted 6/29/2023 with low blood sugars.    Hospital Course  Lily Nicole is a 27 y.o. female who presents to ED on 6/29/2023 for hypoglycemia and dizziness. 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 Her mother Chikis is her medical decision maker.    She had recent admission to [facility] on 6/14 for acute blood loss anemia from bleeding fistula and had complicated course with patient ultimately having NG tube placed for severe malnutrition. Dr. Fortune with psychiatry did advise during that admission that patient was incapacitated to make medical or placement decisions. Palliative care did follow along and hospice was discussed as an option for discharge, however family opted to have patient DC home on 6/28 with NG tube in place for tube feedings.     Apparently her feeding tube supplies were not delivered to her home and she now returns to the ED with glucose 55, diffuse pain, and weakness. In ED she is afebrile, on 7L O2 up from baseline 5L, reports \"pain everywhere\". She does state she does not want PEG tube. In ED she was treated with morphine for pain and boluses of 50% dextrose. She was admitted to hospitalist service for nutritional support while awaiting outpatient supplies to be delivered.     Interval Problem Update  7/2/2023 - I reviewed the patient's chart today. Uneventful night. VSS. Afebrile.  Remains on 5 L of oxygen by nasal cannula.  Hemoglobin 7.5.  Sodium dropped to 119 likely from hypotonic IVF.  I reviewed vascular recommendations and " interventions, patient underwent fistulogram, central venogram and venoplasty of central venous stenosis. She has diffuse body pains.  Nauseated.  No vomiting.  Had a long talk with her about her being D10 dependent at this point, and she will need alternative form of nutrition.  She states she is open to PEG tube placement can be done, otherwise I told her that we might need to put the NGT back.  I spoke with GI to ask for formal consult again for PEG tube placement.  7/3: Ms. Martín Nicole was transferred to the [facility] due to persistent hypoglycemia. She is on an NS+10% dextrose drip at 100 mL/hour and remains hypoglycemic. Her Na this morning is down to 113. Her Hb is down to 6.4 today.  Her only IV access is a left EJ. She is not a candidate for a right IJ due to stenosis.     I have discussed this patient's plan of care and discharge plan at IDT rounds today with Case Management, Nursing, Nursing leadership, and other members of the IDT team.    Consultants/Specialty  vascular surgery  GI  nephro  Code Status  DNAR, I OK    Disposition  The patient is not medically cleared for discharge to home or a post-acute facility.      I have placed the appropriate orders for post-discharge needs.    Review of Systems  Review of Systems   Constitutional:  Positive for malaise/fatigue and weight loss.   Gastrointestinal:  Positive for abdominal pain and nausea.   Psychiatric/Behavioral:  The patient is nervous/anxious.         Physical Exam  Temp:  [36.4 °C (97.5 °F)-36.6 °C (97.9 °F)] 36.4 °C (97.5 °F)  Pulse:  [] 91  Resp:  [15-21] 20  BP: (117-152)/() 117/83  SpO2:  [98 %-100 %] 99 %    Physical Exam  Vitals and nursing note reviewed.   Constitutional:       Appearance: She is ill-appearing and toxic-appearing.      Comments: Severely cachectic   HENT:      Mouth/Throat:      Mouth: Mucous membranes are dry.   Neck:      Comments: Left EJ central line  Pulmonary:      Comments: Nasal cannula  oxygen  Musculoskeletal:      Comments: Very low appendicular muscle mass  + fistula   Neurological:      Mental Status: She is alert.   Psychiatric:      Comments: Flat affect         Fluids    Intake/Output Summary (Last 24 hours) at 7/3/2023 0749  Last data filed at 7/3/2023 0628  Gross per 24 hour   Intake 1918.75 ml   Output 0 ml   Net 1918.75 ml       Laboratory  Recent Labs     07/01/23  1138 07/02/23  0142 07/03/23  0452   WBC 5.5 4.7* 4.6*   RBC 3.45* 3.02* 2.59*   HEMOGLOBIN 8.3* 7.5* 6.4*   HEMATOCRIT 28.3* 25.0* 20.9*   MCV 82.0 82.8 80.7*   MCH 24.1* 24.8* 24.7*   MCHC 29.3* 30.0* 30.6*   RDW 73.0* 76.3* 74.2*   PLATELETCT 187 181 173   MPV 9.4 9.0 9.8     Recent Labs     07/02/23  0955 07/02/23  1612 07/03/23  0142   SODIUM 116* 117* 115*   POTASSIUM 4.4 4.6 4.1   CHLORIDE 83* 86* 83*   CO2 25 25 19*   GLUCOSE 68 128* 61*   BUN 15 15 18   CREATININE 2.92* 2.96* 3.24*   CALCIUM 8.0* 7.6* 7.2*                   Imaging  DX-ABDOMEN FOR TUBE PLACEMENT   Final Result      Feeding tube tip at the descending duodenum.      IR-EXTREMITY ANGIOGRAM-UNILATERAL RIGHT    (Results Pending)   IR-PICC LINE PLACEMENT W/ GUIDANCE > AGE 5    (Results Pending)        Assessment/Plan  * Hypoglycemia- (present on admission)  Assessment & Plan  -brittle hypoglycemia, likely from poor oral intake and inadequate liver gluconeogenesis .  She does not want to go back to NGT feeding.  She refused PEG placement or even TPN in the past.  She remains severely hyponatremic despite 125 mL/hour of NS with 10% dextrose at 125 mL/hour  IV D50 pushes ordered.  She will be dependent on IV dextrose drip until she has enteral access to continuous feeding.    Hyponatremia- (present on admission)  Assessment & Plan  -Acute on chronic, sodium now down to 113 despite NS at 100 mL/hour. Increase the rate to 125 until dialysis.  Na q4 hours in the IMCU  She may require hypertonic saline after a central line is placed.    DNR (do not resuscitate)-  (present on admission)  Assessment & Plan  Per Ms. Martín Nicole's wishes    Chronic heart failure with reduced ejection fraction (HCC)- (present on admission)  Assessment & Plan  -LVEF 45% on echo 6/18/23  -Compensated, does not appear to be in fluid overload.  -Continue Coreg, Cozaar.    Subclinical hypothyroidism- (present on admission)  Assessment & Plan  -TSH:9.130  -Monitor outpatient    Pulmonary hypertension (HCC)- (present on admission)  Assessment & Plan  -5L O2 baseline. Due to MCTD and and lupus.    Systemic lupus erythematosus with organ system involvement (HCC)- (present on admission)  Assessment & Plan  -Continue chronic medications.  Follows up with outpatient rheumatologist.      Severe protein-calorie malnutrition (HCC)- (present on admission)  Assessment & Plan  This is a clinical diagnosis present upon admission. She previously had been on enteral feeding by NG tube.  Does not want further NG tube feeding.  GI consulted for consideration of PEG placement once she is medically optimized.    Anemia of chronic disease, with acute blood loss anemia- (present on admission)  Assessment & Plan  - Related to ESRD, autoimmune disorders, with component of acute blood loss anemia from bleeding AVF.  - Continue to monitor.  Restrictive transfusion strategy.    Immunosuppression (HCC)- (present on admission)  Assessment & Plan  -Due to immunosuppresive medication for lupus    Chronic respiratory failure with hypoxia (HCC)- (present on admission)  Assessment & Plan  -Due to history of pulmonary hypertension.  At baseline 5 L oxygen.    -Continue respiratory support, RT protocol, supplemental oxygen.    ESRD (end stage renal disease) (HCC)- (present on admission)  Assessment & Plan  -On HD M,W,F  -Nephrology following.    Arteriovenous fistula, acquired (HCC)- (present on admission)  Assessment & Plan  - Had bleeding after a scab popped off.  Now s/p fistulogram, central venogram and venoplasty of central venous  stenosis.  -Monitor for any bleeding.         VTE prophylaxis: SCDs/TEDs    I have performed a physical exam and reviewed and updated ROS and Plan today (7/3/2023). In review of yesterday's note (7/2/2023), there are no changes except as documented above.    Ms. Martín Nicole is critically ill. 38 minutes of critical care time were spent with patient, nursing, pharmacy and in specific management of severe, intractable hypoglycemia requiring an IV dextrose drip and frequent D50 boluses in the IMCU. Prognosis is quite guarded. Please see orders.

## 2023-07-03 NOTE — PROGRESS NOTES
0544: RN notified MD Mtz on pt hemoglobin of 6.4, RN to collect COD and give one unit PRB. RN called lab and they will add COD onto CBC MD okay with this.     -RN spoke with MD on need for additional line, MD aware fluids will be paused with blood transfusion. Pt lethargic, unwilling to communicate with RN- RN will call pt mother for consent for blood once type and screen result has come back. RN also spoke on pt blood sugar results: 135, 95 an hour later, RN will recheck sugar again in one hour. MD notified on all of this and agrees with plan

## 2023-07-04 NOTE — PROGRESS NOTES
Attempted CVC placement By Herbert Gonzalez MD    Time out at  1645 ,  all agreed.  Vital signs stable , see vital signs flowsheets.  1710 CVC placement was unsuccessful   Patient stable, will continue to monitor       CVC placement By Mauro Oliveira    Time out at  1738 ,  all agreed.  Vital signs stable , see vital signs flowsheets.   Wire out at  1745  Procedure completed at 1750  Patient stable, will continue to monitor

## 2023-07-04 NOTE — OP REPORT
VASCULAR SURGERY SERVICE  OPERATIVE NOTE      Date: 7/3/2023    Patient: Lily Nicole  : 1995  MRN: [id number]      Preoperative Diagnosis:  - Renal failure  Hyponatremia     Postoperative Diagnosis  - Renal failure    Procedure    Insertion left femoral triple lumen catheter   US guided access femoral vein     Catheter used:  Triple lumen     Surgeon: Mauro Oliveira MD    Blood loss: minimal    Disposition: Patient tolerated procedure well    A time-out was completed verifying correct patient, procedure, site, positioning, and special equipment if applicable. The patient was prepped and draped in sterile fashion. Surgical timeout was called.  1% Lidocaine was used to anesthetize the surrounding skin area. The vein was accessed percutaneously and the J-wire passed easily.  A small puncture incision was made at the insertion site, the tract was dilated, then the catheter was passed over the wire into the vein. Appropriate blood return was obtained. Each lumen of the catheter was evacuated of air, flushed with sterile saline, and capped. The catheter was then sutured in place to the skin and a sterile dressing applied.   The patient tolerated the procedure well and there were no complications.    Mauro Oliveira MD  [facility] Vascular Surgery Service

## 2023-07-04 NOTE — PROGRESS NOTES
"Hospital Medicine Daily Progress Note    Date of Service  7/4/2023    Chief Complaint  Lily Nicole is a 27 y.o. female admitted 6/29/2023 with low blood sugars.    Hospital Course  Lily Nicole is a 27 y.o. female who presents to ED on 6/29/2023 for hypoglycemia and dizziness. She has an extensive medical history including SLE glomerulonephritis syndrome with ESRD on dialysis MWF (on list at [facility]?), possible mixed connective tissue disease, severe malnutrition, hypoglycemia with seizure, HTN, constipation, S.pneumoniae bacteremia and endocarditis with cardiac arrest 11/2022, E.coli bacteremia on 6/20/2023, pulmonary HTN on 5LNC baseline. Her mother Chikis is her medical decision maker.  She had recent admission to [facility] on 6/14 for acute blood loss anemia from bleeding fistula and had complicated course with patient ultimately having NG tube placed for severe malnutrition. Dr. Fortune with psychiatry did advise during that admission that patient was incapacitated to make medical or placement decisions. Palliative care did follow along and hospice was discussed as an option for discharge, however family opted to have patient DC home on 6/28 with NG tube in place for tube feedings.   Apparently her feeding tube supplies were not delivered to her home and she now returns to the ED with glucose 55, diffuse pain, and weakness. In ED she is afebrile, on 7L O2 up from baseline 5L, reports \"pain everywhere\". She does state she does not want PEG tube. In ED she was treated with morphine for pain and boluses of 50% dextrose. She was admitted to hospitalist service for nutritional support while awaiting outpatient supplies to be delivered.     Interval Problem Update  Patient seen and examined today.  Data, Medication data reviewed.  Case discussed with nursing as available.  Plan of Care reviewed with patient and notified of changes.   7/2/2023 - I reviewed the patient's chart today. Uneventful night. " VSS. Afebrile.  Remains on 5 L of oxygen by nasal cannula.  Hemoglobin 7.5.  Sodium dropped to 119 likely from hypotonic IVF.  I reviewed vascular recommendations and interventions, patient underwent fistulogram, central venogram and venoplasty of central venous stenosis. She has diffuse body pains.  Nauseated.  No vomiting.  Had a long talk with her about her being D10 dependent at this point, and she will need alternative form of nutrition.  She states she is open to PEG tube placement can be done, otherwise I told her that we might need to put the NGT back.  I spoke with GI to ask for formal consult again for PEG tube placement.  7/3: Ms. Martín Nicole was transferred to the [facility] due to persistent hypoglycemia. She is on an NS+10% dextrose drip at 100 mL/hour and remains hypoglycemic. Her Na this morning is down to 113. Her Hb is down to 6.4 today.  Her only IV access is a left EJ. She is not a candidate for a right IJ due to stenosis.   7/4 the patient maintains to be fairly lethargic, this morning difficult to arouse, planing of generalized pain, febrile, heart rate anywhere from the 60s to 100s, respiration unlabored, 4 L nasal cannula oxygen, 92 to 99%, blood pressure in the 1 teens to 120s over 90s, laboratory data is reviewed, white cell count is 4.7, hemoglobin 7.9, platelet count 150, sodium is 118, potassium 4.1, chloride 87, bicarb 25, gap is 6, glucose 112, BUN 11, creatinine 2.3, calcium 7.2,  Glucose levels have improved the patient is on D10 by drip at 125 cc an hour, p.o. intake is somewhat improving, the patient is tolerating oral diet.  C-peptide is elevated at 6.8    I have discussed this patient's plan of care and discharge plan at IDT rounds today with Case Management, Nursing, Nursing leadership, and other members of the IDT team.    Consultants/Specialty  vascular surgery  GI  nephro  Code Status  DNAR/DNI    Disposition  The patient is not medically cleared for discharge to home or a  post-acute facility.  Anticipate discharge to: home with close outpatient follow-up    I have placed the appropriate orders for post-discharge needs.    Review of Systems  Review of Systems   Constitutional:  Positive for malaise/fatigue and weight loss. Negative for chills and fever.   HENT: Negative.     Eyes: Negative.    Respiratory: Negative.  Negative for cough.    Cardiovascular: Negative.  Negative for chest pain and palpitations.   Gastrointestinal:  Positive for abdominal pain and nausea. Negative for heartburn and vomiting.   Genitourinary: Negative.  Negative for dysuria and frequency.   Musculoskeletal:  Positive for joint pain and myalgias. Negative for back pain and neck pain.   Skin: Negative.  Negative for itching and rash.   Neurological:  Positive for weakness. Negative for dizziness, focal weakness and headaches.   Endo/Heme/Allergies: Negative.  Negative for polydipsia. Does not bruise/bleed easily.   Psychiatric/Behavioral:  Negative for depression. The patient is nervous/anxious.         Physical Exam  Temp:  [36.3 °C (97.3 °F)-36.4 °C (97.6 °F)] 36.4 °C (97.6 °F)  Pulse:  [] 94  Resp:  [14-20] 17  BP: ()/() 93/63  SpO2:  [71 %-100 %] 100 %    Physical Exam  Vitals and nursing note reviewed.   Constitutional:       Appearance: She is ill-appearing and toxic-appearing.      Comments: Severely cachectic   HENT:      Head:      Comments: Temporal wasting     Mouth/Throat:      Mouth: Mucous membranes are dry.   Neck:      Comments: Left EJ central line  Cardiovascular:      Rate and Rhythm: Tachycardia present.   Pulmonary:      Breath sounds: Rhonchi present.      Comments: Nasal cannula oxygen  Musculoskeletal:      Comments: Very low appendicular muscle mass  + fistula   Skin:     Coloration: Skin is pale.   Neurological:      General: No focal deficit present.      Mental Status: She is alert.      Motor: Weakness present.   Psychiatric:      Comments: Flat affect          Fluids    Intake/Output Summary (Last 24 hours) at 7/4/2023 0956  Last data filed at 7/3/2023 1537  Gross per 24 hour   Intake 2129.79 ml   Output 2000 ml   Net 129.79 ml         Laboratory  Recent Labs     07/02/23  0142 07/03/23  0452 07/04/23  0410   WBC 4.7* 4.6* 4.7*   RBC 3.02* 2.59* 3.07*   HEMOGLOBIN 7.5* 6.4* 7.9*   HEMATOCRIT 25.0* 20.9* 25.5*   MCV 82.8 80.7* 83.1   MCH 24.8* 24.7* 25.7*   MCHC 30.0* 30.6* 31.0*   RDW 76.3* 74.2* 68.4*   PLATELETCT 181 173 150*   MPV 9.0 9.8 9.4       Recent Labs     07/03/23  1848 07/03/23  2340 07/04/23  0410   SODIUM 123* 119* 120*   POTASSIUM 3.7 3.8 3.8   CHLORIDE 90* 87* 89*   CO2 27 27 25   GLUCOSE 80 64* 86   BUN 8 9 10   CREATININE 1.84* 2.11* 2.14*   CALCIUM 7.1* 7.0* 7.1*                     Imaging  DX-ABDOMEN FOR TUBE PLACEMENT   Final Result      Feeding tube tip at the descending duodenum.      IR-EXTREMITY ANGIOGRAM-UNILATERAL RIGHT    (Results Pending)          Assessment/Plan  * Hypoglycemia- (present on admission)  Assessment & Plan  -brittle hypoglycemia, likely from poor oral intake and inadequate liver gluconeogenesis .    She does not want to go back to NGT feeding.  She refused PEG placement or even TPN in the past.  She remains severely hyponatremic despite 125 mL/hour of NS with 10% dextrose at 125 mL/hour  IV D50 pushes ordered.  She will be dependent on IV dextrose drip until she has enteral access to continuous feeding  Elevated C-peptide indicates significant amount of insulin secretion      DNR (do not resuscitate)- (present on admission)  Assessment & Plan  Per Ms. Martín Nicole's wishes    Chronic heart failure with reduced ejection fraction (HCC)- (present on admission)  Assessment & Plan  -LVEF 45% on echo 6/18/23  -Compensated, does not appear to be in fluid overload.  -Continue Coreg, Cozaar.    Subclinical hypothyroidism- (present on admission)  Assessment & Plan  -TSH:9.130  -Monitor outpatient    Pulmonary hypertension (HCC)-  (present on admission)  Assessment & Plan  -5L O2 baseline. Due to MCTD and and lupus.    Systemic lupus erythematosus with organ system involvement (HCC)- (present on admission)  Assessment & Plan  -Continue chronic medications.  Follows up with outpatient rheumatologist.      Hyponatremia- (present on admission)  Assessment & Plan  -Acute on chronic, sodium now down to 113 despite NS at 100 mL/hour.   Increase the rate to 125 until dialysis.  Na q4 hours in the IMCU  Additional salt intake    Severe protein-calorie malnutrition (HCC)- (present on admission)  Assessment & Plan  This is a clinical diagnosis present upon admission.   She previously had been on enteral feeding by NG tube.  Does not want further NG tube feeding.  GI consulted for consideration of PEG placement once she is medically optimized.  Monitor current oral intake, optimize as much as possible    Anemia of chronic disease, with acute blood loss anemia- (present on admission)  Assessment & Plan  - Related to ESRD, autoimmune disorders, with component of acute blood loss anemia from bleeding AVF.  - Continue to monitor.  Restrictive transfusion strategy.    Immunosuppression (HCC)- (present on admission)  Assessment & Plan  -Due to immunosuppresive medication for lupus    Chronic respiratory failure with hypoxia (HCC)- (present on admission)  Assessment & Plan  -Due to history of pulmonary hypertension.  At baseline 5 L oxygen.    -Continue respiratory support, RT protocol, supplemental oxygen.    ESRD (end stage renal disease) ([facility])- (present on admission)  Assessment & Plan  -On HD M,W,F  -Nephrology following.    Arteriovenous fistula, acquired ([facility])- (present on admission)  Assessment & Plan  - Had bleeding after a scab popped off.  Now s/p fistulogram, central venogram and venoplasty of central venous stenosis.  -Monitor for any bleeding.    Plan  Continue attempts to optimize glycemic control  Ongoing renal support  Monitor and optimize p.o.  intake  Additional salt supplementation  Continue IV fluids for the time being  Avoid further hypoglycemia  Supportive care  See orders  Patient is has a high medical complexity, complex decision making and is at high risk for complication, morbidity, and mortality.  My total time spent caring for the patient on the day of the encounter was 53 minutes.   This does not include time spent on separately billable procedures/tests.      VTE prophylaxis: SCDs/TEDs    I have performed a physical exam and reviewed and updated ROS and Plan today (7/4/2023). In review of yesterday's note (7/3/2023), there are no changes except as documented above.    Please note that this dictation was created using voice recognition software. I have made every reasonable attempt to correct obvious errors, but I expect that there are errors of grammar and possibly context that I did not discover before finalizing the note.

## 2023-07-04 NOTE — CARE PLAN
The patient is Watcher - Medium risk of patient condition declining or worsening    Shift Goals  Clinical Goals: Maintain blood glucose at normal level  Patient Goals: anxiety control  Family Goals: comfort    Progress made toward(s) clinical / shift goals:    Problem: Knowledge Deficit - Standard  Goal: Patient and family/care givers will demonstrate understanding of plan of care, disease process/condition, diagnostic tests and medications  Outcome: Progressing     Problem: Fall Risk  Goal: Patient will remain free from falls  Outcome: Progressing     Problem: Pain - Standard  Goal: Alleviation of pain or a reduction in pain to the patient’s comfort goal  Outcome: Progressing     Problem: Skin Integrity  Goal: Skin integrity is maintained or improved  Outcome: Progressing     Problem: Nutrition  Goal: Patient's nutritional and fluid intake will be adequate or improve  Outcome: Progressing

## 2023-07-04 NOTE — CARE PLAN
The patient is Stable - Low risk of patient condition declining or worsening    Shift Goals  Clinical Goals: stable vitals and blood sugars  Patient Goals: anxiety and pain meds  Family Goals: no family present at this time      Problem: Pain - Standard  Goal: Alleviation of pain or a reduction in pain to the patient’s comfort goal  Outcome: Not Progressing     Problem: Skin Integrity  Goal: Skin integrity is maintained or improved  Outcome: Not Progressing

## 2023-07-04 NOTE — CONSULTS
"  VASCULAR SURGERY SERVICE  CONSULT NOTE      Date: 7/3/2023    Referring Provider: Herbert Gonzalez M.d.    Consulting Physician: Mauro Oliveira MD    -------------------------------------------------------------------------------------------------    Reason for consultation: dialysis catheter placement    HPI: This is a 27 y.o. female with renal failure in need of central venous access. Temporary non-tunneled catheter placement has been requested at this time. When I came to evaluate the patient she was in no acute distress.     Past Medical History:   Diagnosis Date    Anemia 01/17/2018    AVF (arteriovenous fistula) ([facility])     Right Arm    Chest pain     Chest tightness     Daytime sleepiness     Dialysis patient ([facility])      dialysis, M,W,F Elizabeth/Joni    Difficulty breathing     ESRD (end stage renal disease) on dialysis ([facility]) 01/17/2018    Twan Fu    Gasfernando for breath     Heart burn     Hypertension 01/17/2018    \"Controlled with medication\"    Indigestion     Lupus ([facility])     Migraines 01/17/2018    Painful breathing     Palpitations     Seizure ([facility]) 2013    from high blood pressure, reports 1 time event    Shortness of breath     Swelling of lower extremity     Wheezing        Past Surgical History:   Procedure Laterality Date    [place] UPPER GI ENDOSCOPY,DIAGNOSIS N/A 8/26/2022    Procedure: ESOPHAGOGASTRODUODENOSCOPY;  Surgeon: Parveen Wood M.D.;  Location: [facility];  Service: Gastroenterology    [place] UPPER GI ENDOSCOPY,CTRL BLEED N/A 8/26/2022    Procedure: GASTROSCOPY, WITH ARGON PLASMA COAGULATION;  Surgeon: Parveen Wood M.D.;  Location: [facility];  Service: Gastroenterology    OH BRONCHOSCOPY,DIAGNOSTIC Bilateral 5/13/2021    Procedure: BRONCHOSCOPY, BRONCHOALVEOLAR LAVAGE;  Surgeon: William Spangler M.D.;  Location: [facility];  Service: Pulmonary    [place] UPPER GI ENDOSCOPY,DIAGNOSIS N/A 3/19/2021    Procedure: GASTROSCOPY;  Surgeon: Waylon Mcqueen M.D.;  Location: " [facility];  Service: Gastroenterology    [place] UPPER GI ENDOSCOPY,CTRL BLEED  3/19/2021    Procedure: GASTROSCOPY, WITH ARGON PLASMA COAGULATION;  Surgeon: Waylon Mcqueen M.D.;  Location: [facility];  Service: Gastroenterology    VA UPPER GI ENDOSCOPY,DIAGNOSIS  3/5/2021    Procedure: GASTROSCOPY - W/HEMOSTASIS;  Surgeon: Ej Silva M.D.;  Location: [facility];  Service: Gastroenterology    VA COLONOSCOPY,DIAGNOSTIC  1/8/2021    Procedure: COLONOSCOPY;  Surgeon: Herbert Contreras M.D.;  Location: [facility];  Service: Gastroenterology    VA UPPER GI ENDOSCOPY,DIAGNOSIS  1/8/2021    Procedure: GASTROSCOPY;  Surgeon: Herbert Contreras M.D.;  Location: [facility];  Service: Gastroenterology    [place] UPPER GI ENDOSCOPY,CTRL BLEED  1/8/2021    Procedure: GASTROSCOPY, WITH ARGON PLASMA COAGULATION;  Surgeon: Herbert Contreras M.D.;  Location: [facility];  Service: Gastroenterology    [place] UPPER GI ENDOSCOPY,CTRL BLEED  11/12/2020    Procedure: GASTROSCOPY, WITH ARGON PLASMA COAGULATION;  Surgeon: Gadiel Whitney M.D.;  Location: [facility];  Service: Gastroenterology    VA UPPER GI ENDOSCOPY,BIOPSY  11/12/2020    Procedure: GASTROSCOPY, WITH BIOPSY;  Surgeon: Gadiel Whitney M.D.;  Location: [facility];  Service: Gastroenterology    GASTROSCOPY-ENDO  11/12/2020    Procedure: GASTROSCOPY;  Surgeon: Gadiel Whitnye M.D.;  Location: [facility];  Service: Gastroenterology    GASTROSCOPY-ENDO  9/18/2020    Procedure: GASTROSCOPY;  Surgeon: Gadiel Whitney M.D.;  Location: [facility];  Service: Gastroenterology    GASTROSCOPY N/A 5/30/2020    Procedure: GASTROSCOPY;  Surgeon: Waylon cMqueen M.D.;  Location: [facility];  Service: Gastroenterology    GASTROSCOPY-ENDO  12/9/2019    Procedure: GASTROSCOPY;  Surgeon: Aaron Kam M.D.;  Location: [facility];  Service: Gastroenterology    ANGIOPLASTY   "01/17/2018    \"Right Arm AV-Fistulagram & Angioplastyx3\"    ULI BY LAPAROSCOPY  4/5/2010    Performed by SYED MARTELL at [facility] ORS    AV FISTULA CREATION Right     OTHER      renal biopsy x 3    OTHER      bone marrow biopsy       Current Facility-Administered Medications   Medication Dose Route Frequency Provider Last Rate Last Admin    HYDROmorphone (Dilaudid) injection 1 mg  1 mg Intravenous Q4HRS PRN GETACHEW Marino.P.R.N.   1 mg at 07/03/23 1650    sodium chloride 154 mEq in dextrose 10% 1,000 mL infusion   Intravenous Continuous Herbert Gonzalez M.D. 125 mL/hr at 07/03/23 1107 New Bag at 07/03/23 1107    sodium chloride (Salt) tablet 1 g  1 g Oral BID Olu Riggs M.D.   1 g at 07/03/23 0923    diphenhydrAMINE (Benadryl) tablet/capsule 25 mg  25 mg Oral Once GETACHEW Marino.P.R.N.        therapeutic multivitamin-minerals (Theragran-M) tablet 1 Tablet  1 Tablet Oral DAILY Mary Mtz A.P.R.N.   1 Tablet at 07/02/23 0517    losartan (Cozaar) tablet 25 mg  25 mg Oral DAILY Mary Mtz, A.P.R.N.   25 mg at 07/03/23 0905    senna-docusate (Pericolace Or Senokot S) 8.6-50 MG per tablet 2 Tablet  2 Tablet Oral BID Mary Mtz A.P.R.N.   2 Tablet at 07/02/23 0517    And    polyethylene glycol/lytes (Miralax) PACKET 1 Packet  1 Packet Oral QDAY PRN GETACHEW Marino.P.R.N.        And    magnesium hydroxide (Milk Of Magnesia) suspension 30 mL  30 mL Oral QDAY PRN Mary Mtz A.P.R.N.        And    bisacodyl (Dulcolax) suppository 10 mg  10 mg Rectal QDAY PRN Mary Mtz A.P.R.N.        OLANZapine (ZyPREXA) tablet 2.5 mg  2.5 mg Oral Q EVENING BRIANNA MarinoP.R.N.   2.5 mg at 07/02/23 1820    thiamine (Vitamin B-1) tablet 100 mg  100 mg Oral DAILY BRIANNA MarinoP.R.N.   100 mg at 07/02/23 0517    mycophenolate (Cellcept) 200 MG/ML susp 500 mg  500 mg Oral QAM BRIANNA MarinoP.R.N.        hydroxychloroquine " (Plaquenil) tablet 200 mg  200 mg Oral QAM GETACHEW Marino.P.R.N.   200 mg at 07/02/23 0517    predniSONE (Deltasone) tablet 5 mg  5 mg Oral DAILY GETACHEW Marino.P.R.N.   5 mg at 07/03/23 0922    escitalopram (Lexapro) tablet 10 mg  10 mg Oral DAILY GETACHEW Marino.P.R.N.   10 mg at 07/03/23 0922    folic acid (Folvite) tablet 1 mg  1 mg Oral DAILY GETACHEW Marino.P.R.N.   1 mg at 07/03/23 0905    carvedilol (Coreg) tablet 3.125 mg  3.125 mg Oral BID WITH MEALS BRIANNA MarinoP.R.N.   3.125 mg at 07/03/23 0922    omeprazole (PriLOSEC) capsule 20 mg  20 mg Oral DAILY GETACHEW Marino.P.R.N.   20 mg at 07/03/23 0906    zinc sulfate (Zincate) capsule 220 mg  220 mg Oral DAILY GETACHEW Marino.P.R.N.   220 mg at 07/03/23 0904    promethazine (Phenergan) tablet 25 mg  25 mg Oral Q6HRS PRN Olu Riggs M.D.   25 mg at 07/01/23 1106    prochlorperazine (Compazine) injection 10 mg  10 mg Intravenous Q4HRS PRN Olu Riggs M.D.   10 mg at 07/01/23 1137    LORazepam (Ativan) injection 0.5 mg  0.5 mg Intravenous Q6HRS PRN Olu Riggs M.D.   0.5 mg at 07/03/23 0909    Or    LORazepam (Ativan) tablet 0.5 mg  0.5 mg Oral Q6HRS PRN Olu Riggs M.D.        diphenhydrAMINE (Benadryl) injection 25 mg  25 mg Intravenous Q6HRS PRN GETACHEW Marino.P.R.N.   25 mg at 07/03/23 0142    cloNIDine (Catapres) 0.2 MG/24HR patch 1 Patch  1 Patch Transdermal Q7 DAYS Rivera Gibson D.O.   1 Patch at 06/29/23 2117    hydrALAZINE (Apresoline) injection 10 mg  10 mg Intravenous Q4HRS PRN Rivera Gibson D.O.   10 mg at 07/01/23 1722    dextrose 10 % BOLUS 25 g  25 g Intravenous Q15 MIN PRN Rivera Gibson D.O. 999 mL/hr at 07/03/23 0949 25 g at 07/03/23 0949    ondansetron (Zofran) syringe/vial injection 4 mg  4 mg Intravenous Q4HRS PRN BRIANNA KamP.RJgNJg   4 mg at 07/03/23 0928       Social History     Socioeconomic History    Marital status: Single     Spouse name: Not  on file    Number of children: Not on file    Years of education: Not on file    Highest education level: Not on file   Occupational History    Not on file   Tobacco Use    Smoking status: Never    Smokeless tobacco: Never   Vaping Use    Vaping Use: Never used   Substance and Sexual Activity    Alcohol use: No    Drug use: No    Sexual activity: Not on file   Other Topics Concern    Not on file   Social History Narrative    Not on file     Social Determinants of Health     Financial Resource Strain: Low Risk  (3/8/2021)    Overall Financial Resource Strain (CARDIA)     Difficulty of Paying Living Expenses: Not hard at all   Food Insecurity: No Food Insecurity (3/8/2021)    Hunger Vital Sign     Worried About Running Out of Food in the Last Year: Never true     Ran Out of Food in the Last Year: Never true   Transportation Needs: No Transportation Needs (3/8/2021)    PRAPARE - Transportation     Lack of Transportation (Medical): No     Lack of Transportation (Non-Medical): No   Physical Activity: Not on file   Stress: Not on file   Social Connections: Not on file   Intimate Partner Violence: Not on file   Housing Stability: Not on file       Family History   Problem Relation Age of Onset    Diabetes Paternal Grandmother        Allergies:  Cephalexin, Clindamycin, Hydrocodone, Maxipime [cefepime], Methylprednisolone, Metoprolol,  , Diagnostic x-ray materials, Furosemide, Hydroxyzine hcl, Insulin, Compazine, Fentanyl and related, Metoclopramide, and Tape    Review of Systems:  Noncontributory except as per HPI    Physical Exam:  /84   Pulse 92   Temp 36.3 °C (97.3 °F)   Resp 20   Wt 51.6 kg (113 lb 12.1 oz)   SpO2 100%     Constitutional: Deconditioned   Labs:  Recent Labs     07/01/23  1138 07/02/23  0142 07/03/23  0452   WBC 5.5 4.7* 4.6*   RBC 3.45* 3.02* 2.59*   HEMOGLOBIN 8.3* 7.5* 6.4*   HEMATOCRIT 28.3* 25.0* 20.9*   MCV 82.0 82.8 80.7*   MCH 24.1* 24.8* 24.7*   MCHC 29.3* 30.0* 30.6*   RDW 73.0*  76.3* 74.2*   PLATELETCT 187 181 173   MPV 9.4 9.0 9.8     Recent Labs     07/02/23  1612 07/03/23  0142 07/03/23  0718   SODIUM 117* 115* 113*   POTASSIUM 4.6 4.1 4.0   CHLORIDE 86* 83* 85*   CO2 25 19* 21   GLUCOSE 128* 61* 141*   BUN 15 18 18   CREATININE 2.96* 3.24* 3.24*   CALCIUM 7.6* 7.2* 7.1*         Recent Labs     07/01/23  1138   ASTSGOT 17   ALTSGPT 5   TBILIRUBIN 0.4   ALKPHOSPHAT 169*   GLOBULIN 6.0*         Assessment: This is a 27 y.o. female in need of a non-tunneled central venous catheter. Will place today.    I explained the details of the operation, alternatives, and potential risks, including but not limited to bleeding, infection, injury to vessels or nerves, and risks of anesthesia.  All questions were answered. Patient understands and agrees to proceed.      Mauro Oliveira MD  [facility]  [place] preferred. Otherwise please call my office [phone]  __________________________________________________________________  Patient:Lily Nicole   MRN:[id number]   CSN:[id number]

## 2023-07-04 NOTE — PROGRESS NOTES
Nephrology Progress Note    Date of Service  7/4/2023    Referring Physician  Herbert Gonzalez M.D.    Consulting Physician  Digna Millan M.D.    Reason for Consultation  Dialysis needs    History of Presenting Illness  27 y.o. female who presented 6/29/2023 with stage renal disease on hemodialysis who presents with failure to thrive, hypoglycemia.      Subjective   7/3: overnight- Noted to have sodium of 113.  Anticipated HD planned for today.    7/4: feeling better, eating breakfast.  Denies nausea/vomiting.   Review of Systems  ROS    Medications  Current Facility-Administered Medications   Medication Dose Route Frequency Provider Last Rate Last Admin    HYDROmorphone (Dilaudid) injection 1 mg  1 mg Intravenous Q4HRS PRN Mary Mtz, A.P.R.N.   1 mg at 07/04/23 0628    sodium chloride 154 mEq in dextrose 10% 1,000 mL infusion   Intravenous Continuous Herbert Gonazlez M.D. 125 mL/hr at 07/04/23 0953 New Bag at 07/04/23 0953    sodium chloride (Salt) tablet 1 g  1 g Oral BID Olu Riggs M.D.   1 g at 07/04/23 0519    therapeutic multivitamin-minerals (Theragran-M) tablet 1 Tablet  1 Tablet Oral DAILY Mary Mtz, A.P.R.N.   1 Tablet at 07/04/23 0520    losartan (Cozaar) tablet 25 mg  25 mg Oral DAILY Mary Mtz A.P.R.N.   25 mg at 07/04/23 0520    senna-docusate (Pericolace Or Senokot S) 8.6-50 MG per tablet 2 Tablet  2 Tablet Oral BID Mary Mtz A.P.R.N.   2 Tablet at 07/02/23 0517    And    polyethylene glycol/lytes (Miralax) PACKET 1 Packet  1 Packet Oral QDAY PRN Mary Mtz A.P.R.N.        And    magnesium hydroxide (Milk Of Magnesia) suspension 30 mL  30 mL Oral QDAY PRN Mary Mtz A.P.R.N.        And    bisacodyl (Dulcolax) suppository 10 mg  10 mg Rectal QDAY PRN Mary Mtz A.P.R.N.        OLANZapine (ZyPREXA) tablet 2.5 mg  2.5 mg Oral Q EVENING BRIANNA MarinoP.RJgN.   2.5 mg at 07/03/23 1813    thiamine (Vitamin B-1) tablet 100  mg  100 mg Oral DAILY Mary Mtz A.P.R.N.   100 mg at 07/04/23 0520    mycophenolate (Cellcept) 200 MG/ML susp 500 mg  500 mg Oral QAM GETACHEW Marino.P.R.N.   500 mg at 07/04/23 0520    hydroxychloroquine (Plaquenil) tablet 200 mg  200 mg Oral QAM GETACHEW Marino.P.R.N.   200 mg at 07/04/23 0519    predniSONE (Deltasone) tablet 5 mg  5 mg Oral DAILY Mary Mtz A.P.R.N.   5 mg at 07/04/23 0519    escitalopram (Lexapro) tablet 10 mg  10 mg Oral DAILY GETACHEW Marino.P.R.N.   10 mg at 07/04/23 0520    folic acid (Folvite) tablet 1 mg  1 mg Oral DAILY GETACHEW Marino.P.R.N.   1 mg at 07/04/23 0520    carvedilol (Coreg) tablet 3.125 mg  3.125 mg Oral BID WITH MEALS GETACHEW Marino.P.R.N.   3.125 mg at 07/04/23 0917    omeprazole (PriLOSEC) capsule 20 mg  20 mg Oral DAILY GETACHEW Marino.P.R.N.   20 mg at 07/04/23 0520    zinc sulfate (Zincate) capsule 220 mg  220 mg Oral DAILY GETACHEW Marino.P.R.N.   220 mg at 07/04/23 0519    promethazine (Phenergan) tablet 25 mg  25 mg Oral Q6HRS PRN CHAUNCEY PayneDJg   25 mg at 07/01/23 1106    prochlorperazine (Compazine) injection 10 mg  10 mg Intravenous Q4HRS PRN Olu Riggs M.D.   10 mg at 07/01/23 1137    LORazepam (Ativan) injection 0.5 mg  0.5 mg Intravenous Q6HRS PRN CHAUNCEY PayneD.   0.5 mg at 07/04/23 0404    Or    LORazepam (Ativan) tablet 0.5 mg  0.5 mg Oral Q6HRS PRN Olu Riggs M.D.        diphenhydrAMINE (Benadryl) injection 25 mg  25 mg Intravenous Q6HRS PRN AMARILIS MarinoRJgN.   25 mg at 07/04/23 0510    cloNIDine (Catapres) 0.2 MG/24HR patch 1 Patch  1 Patch Transdermal Q7 DAYS Rivera Gibson D.O.   1 Patch at 06/29/23 2117    hydrALAZINE (Apresoline) injection 10 mg  10 mg Intravenous Q4HRS PRN ZULEIKA ShultzOJg   10 mg at 07/01/23 1722    dextrose 10 % BOLUS 25 g  25 g Intravenous Q15 MIN PRN ZULEIKA ShultzO. 999 mL/hr at 07/04/23 0055 25 g at 07/04/23 0055     ondansetron (Zofran) syringe/vial injection 4 mg  4 mg Intravenous Q4HRS PRN Shelby Alvarado, A.P.R.N.   4 mg at 07/04/23 0605       Allergies  Allergies   Allergen Reactions    Cephalexin Rash     Nausea and rash   Hives  .    Clindamycin Rash     Nausea and rash     Hive  .    Hydrocodone Rash and Nausea     Rash      Maxipime [Cefepime] Itching    Methylprednisolone Unspecified     Anxious      Tolerates prednisone     Metoprolol Rash and Nausea     Nausea and rash     Tolerates antenalol          Diagnostic X-Ray Materials Itching     Per patient's mother    Furosemide      Other reaction(s): Unknown-Explain in Comments  Unable to obtain at this time    Hydroxyzine Hcl Rash    Insulin      Other reaction(s): Other-Reaction in Comments  Patient is very sensitive to insulin administration, especially when treating hyperkalemia.  Has a hx of blood glucose 12.  Closely monitor for severe hypoglycemia that could precipitate seizures. If it's required, then give less insulin/more dextrose to prevent such hypoglycemic episodes.    9/25/2022   Verified by Dr. Kelsie Emerson (endocrinoligy)    Compazine Anxiety    Fentanyl And Related Anxiety    Metoclopramide Anxiety    Tape Rash     Paper tape is ok       Physical Exam  Temp:  [36.3 °C (97.3 °F)-36.4 °C (97.6 °F)] 36.4 °C (97.6 °F)  Pulse:  [] 62  Resp:  [14-23] 23  BP: ()/() 112/80  SpO2:  [92 %-100 %] 92 %    Physical Exam  GEN: alert and oriented in no acute distress.chronically ill appearing, pale, thin young woman.    HEENT: dry oropharygneal mucous membranes and lips  CV:RRR, normal s1 s2  PULM: clear to auscultation bilaterally  ABD: soft non tender non distended  EXT: warm well perfused, no lower extremity edema   Fluids  Date 07/03/23 0700 - 07/04/23 0659   Shift 5049-1383 0298-8935 5219-0233 24 Hour Total   INTAKE   Blood 295   295   IV Piggyback 839.8   839.8   Shift Total 1134.8   1134.8   OUTPUT   Shift Total       Weight (kg) 51.6  51.6 51.6 51.6       Laboratory  Labs reviewed, pertinent labs below.  Recent Labs     07/02/23  0142 07/03/23  0452 07/04/23  0410   WBC 4.7* 4.6* 4.7*   RBC 3.02* 2.59* 3.07*   HEMOGLOBIN 7.5* 6.4* 7.9*   HEMATOCRIT 25.0* 20.9* 25.5*   MCV 82.8 80.7* 83.1   MCH 24.8* 24.7* 25.7*   MCHC 30.0* 30.6* 31.0*   RDW 76.3* 74.2* 68.4*   PLATELETCT 181 173 150*   MPV 9.0 9.8 9.4       Recent Labs     07/03/23  1848 07/03/23  2340 07/04/23  0410   SODIUM 123* 119* 120*   POTASSIUM 3.7 3.8 3.8   CHLORIDE 90* 87* 89*   CO2 27 27 25   GLUCOSE 80 64* 86   BUN 8 9 10   CREATININE 1.84* 2.11* 2.14*   CALCIUM 7.1* 7.0* 7.1*                  URINALYSIS:  Lab Results   Component Value Date/Time    COLORURINE Yellow 02/15/2021 1108    CLARITY Clear 02/15/2021 1108    SPECGRAVITY 1.015 02/15/2021 1108    PHURINE 8.5 (A) 02/15/2021 1108    KETONES Negative 02/15/2021 1108    PROTEINURIN 30 (A) 02/15/2021 1108    BILIRUBINUR Negative 02/15/2021 1108    UROBILU 0.2 07/16/2020 0900    NITRITE Negative 02/15/2021 1108    LEUKESTERAS Negative 02/15/2021 1108    OCCULTBLOOD Trace (A) 02/15/2021 1108     UPC  Lab Results   Component Value Date/Time    TOTPROTUR 45.0 (H) 07/16/2020 0900      Lab Results   Component Value Date/Time    CREATININEU 18.93 07/16/2020 0900       Imaging interpreted by radiologist. Imaging reports reviewed with pertinent findings below  DX-ABDOMEN FOR TUBE PLACEMENT   Final Result      Feeding tube tip at the descending duodenum.      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 Trent  - Recommend routine hemodialysis for maintenance clearance and volume needs  - Recommend HD on 07/03/23. Anticipate next HD on 07/05/23.   - Dose all medications for patient dialysis, renal function  - Avoid nephrotoxic agents  - Obtain phosphate level twice weekly.  - Appreciate  Palliative care  recommendations.      2. Hyponatermia - Encourage po intake as much as tolerated. Avoid salt tablets. Recommend  nromal saline based medications, infusions. Continue IVF with sodium choride in D10% Continue to be addressed with low sodium bath 130 with HD. Continue to monitor.    3. HTN -Continue coreg 3.125 mg BID, Losartan 25 mg daily. Clonidine 0.2 mg patch.     4. SLE - Continue hydroxycholoroquine. Cellcept.         Digna Millan MD  Nephrology  [facility]

## 2023-07-04 NOTE — DIETARY
Nutrition Services:    Pt transitioned from TF to PO diet on 6/30. Current diet Renal; Fluid modifications: (optional): 1000 ml Fluid Restriction . PO intake per chart review <25%. Per MD note for 07/02, Pt open to PEG placement and GI consulted again for PEG tube. Palliative to have follow up family meeting either today or when available for further discussion of hospice/end of life care.      RD following.

## 2023-07-04 NOTE — CARE PLAN
The patient is Watcher - Medium risk of patient condition declining or worsening    Shift Goals  Clinical Goals: monitor electrolytes  Patient Goals: anxiety  Family Goals: hannah    Progress made toward(s) clinical / shift goals:    Problem: Knowledge Deficit - Standard  Goal: Patient and family/care givers will demonstrate understanding of plan of care, disease process/condition, diagnostic tests and medications  Outcome: Progressing     Problem: Fall Risk  Goal: Patient will remain free from falls  Outcome: Progressing     Problem: Pain - Standard  Goal: Alleviation of pain or a reduction in pain to the patient’s comfort goal  Outcome: Progressing     Problem: Skin Integrity  Goal: Skin integrity is maintained or improved  Outcome: Progressing     Problem: Nutrition  Goal: Patient's nutritional and fluid intake will be adequate or improve  Outcome: Progressing       Patient is not progressing towards the following goals:

## 2023-07-05 NOTE — PROCEDURES
Diagnosis:  End-Stage Renal Disease. Patient seen and examined on hemodialysis during treatment. Patient is stable, tolerating hemodialysis. Denies chest pain and shortness of breath. Orders updated as needed. Please refer to flowsheet for full details.    Access: R AVF  UF goal: 2-3 kg as tolerated    Plan: Continue routine hemodialysis for ongoing clearance and volume needs.    Digna Millan MD  Nephrology   [facility] [place]

## 2023-07-05 NOTE — PROGRESS NOTES
"Hospital Medicine Daily Progress Note    Date of Service  7/5/2023    Chief Complaint  Lily Nicole is a 27 y.o. female admitted 6/29/2023 with low blood sugars.    Hospital Course  Lily Nicole is a 27 y.o. female who presents to ED on 6/29/2023 for hypoglycemia and dizziness. 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 Her mother Chikis is her medical decision maker.  She had recent admission to [facility] on 6/14 for acute blood loss anemia from bleeding fistula and had complicated course with patient ultimately having NG tube placed for severe malnutrition. Dr. Fortune with psychiatry did advise during that admission that patient was incapacitated to make medical or placement decisions. Palliative care did follow along and hospice was discussed as an option for discharge, however family opted to have patient DC home on 6/28 with NG tube in place for tube feedings.   Apparently her feeding tube supplies were not delivered to her home and she now returns to the ED with glucose 55, diffuse pain, and weakness. In ED she is afebrile, on 7L O2 up from baseline 5L, reports \"pain everywhere\". She does state she does not want PEG tube. In ED she was treated with morphine for pain and boluses of 50% dextrose. She was admitted to hospitalist service for nutritional support while awaiting outpatient supplies to be delivered.     Interval Problem Update  Patient seen and examined today.  Data, Medication data reviewed.  Case discussed with nursing as available.  Plan of Care reviewed with patient and notified of changes.   7/2/2023 - I reviewed the patient's chart today. Uneventful night. " VSS. Afebrile.  Remains on 5 L of oxygen by nasal cannula.  Hemoglobin 7.5.  Sodium dropped to 119 likely from hypotonic IVF.  I reviewed vascular recommendations and interventions, patient underwent fistulogram, central venogram and venoplasty of central venous stenosis. She has diffuse body pains.  Nauseated.  No vomiting.  Had a long talk with her about her being D10 dependent at this point, and she will need alternative form of nutrition.  She states she is open to PEG tube placement can be done, otherwise I told her that we might need to put the NGT back.  I spoke with GI to ask for formal consult again for PEG tube placement.  7/3: Ms. Martín Nicole was transferred to the [facility] due to persistent hypoglycemia. She is on an NS+10% dextrose drip at 100 mL/hour and remains hypoglycemic. Her Na this morning is down to 113. Her Hb is down to 6.4 today.  Her only IV access is a left EJ. She is not a candidate for a right IJ due to stenosis.   7/4 the patient maintains to be fairly lethargic, this morning difficult to arouse, planing of generalized pain, febrile, heart rate anywhere from the 60s to 100s, respiration unlabored, 4 L nasal cannula oxygen, 92 to 99%, blood pressure in the 1 teens to 120s over 90s, laboratory data is reviewed, white cell count is 4.7, hemoglobin 7.9, platelet count 150, sodium is 118, potassium 4.1, chloride 87, bicarb 25, gap is 6, glucose 112, BUN 11, creatinine 2.3, calcium 7.2,  Glucose levels have improved the patient is on D10 by drip at 125 cc an hour, p.o. intake is somewhat improving, the patient is tolerating oral diet.  C-peptide is elevated at 6.8  7/5 patient appears improved, she is eating some food that was brought in by family, she still has some generalized achiness, underwent hemodialysis  Weaning off D10 drip, so far tolerating  Patient is afebrile, heart rate in 100s, respiration unlabored, 4 L nasal cannula oxygen, blood pressure in the 110s to 120s over 90s and  70s  Laboratory data with a white count 4.8, hemoglobin 7.5, hematocrit 24.3, platelet count 141, sodium at 125, potassium 4.1, chloride 92, bicarb 29, glucose 87, BUN is 8, creatinine 1.7,  Blood sugars not in the high 90s to low 90s,    I have discussed this patient's plan of care and discharge plan at IDT rounds today with Case Management, Nursing, Nursing leadership, and other members of the IDT team.    Consultants/Specialty  vascular surgery  GI  nephro  Code Status  DNAR/DNI    Disposition  The patient is not medically cleared for discharge to home or a post-acute facility.  Anticipate discharge to: home with close outpatient follow-up    I have placed the appropriate orders for post-discharge needs.    Review of Systems  Review of Systems   Constitutional:  Positive for malaise/fatigue and weight loss. Negative for chills and fever.   HENT: Negative.     Eyes: Negative.    Respiratory: Negative.  Negative for cough.    Cardiovascular: Negative.  Negative for chest pain and palpitations.   Gastrointestinal:  Positive for abdominal pain and nausea. Negative for heartburn and vomiting.   Genitourinary: Negative.  Negative for dysuria and frequency.   Musculoskeletal:  Positive for joint pain and myalgias. Negative for back pain and neck pain.   Skin: Negative.  Negative for itching and rash.   Neurological:  Positive for weakness. Negative for dizziness, focal weakness and headaches.   Endo/Heme/Allergies: Negative.  Negative for polydipsia. Does not bruise/bleed easily.   Psychiatric/Behavioral:  Negative for depression. The patient is nervous/anxious.         Physical Exam  Temp:  [36.4 °C (97.6 °F)-36.9 °C (98.4 °F)] 36.7 °C (98 °F)  Pulse:  [] 82  Resp:  [15-24] 23  BP: ()/() 130/96  SpO2:  [92 %-100 %] 93 %    Physical Exam  Vitals and nursing note reviewed.   Constitutional:       Appearance: She is ill-appearing and toxic-appearing.      Comments: Severely cachectic   HENT:      Head:       Comments: Temporal wasting     Mouth/Throat:      Mouth: Mucous membranes are dry.   Cardiovascular:      Rate and Rhythm: Tachycardia present.   Pulmonary:      Breath sounds: Rhonchi present.      Comments: Nasal cannula oxygen  Musculoskeletal:      Comments: Very low muscle mass  + fistula right upper extremity   Skin:     Coloration: Skin is pale.   Neurological:      General: No focal deficit present.      Mental Status: She is alert.      Motor: Weakness present.   Psychiatric:      Comments: Flat affect         Fluids    Intake/Output Summary (Last 24 hours) at 7/5/2023 0743  Last data filed at 7/5/2023 0716  Gross per 24 hour   Intake 3254.53 ml   Output --   Net 3254.53 ml         Laboratory  Recent Labs     07/03/23  0452 07/04/23  0410 07/05/23  0312   WBC 4.6* 4.7* 4.8   RBC 2.59* 3.07* 2.90*   HEMOGLOBIN 6.4* 7.9* 7.5*   HEMATOCRIT 20.9* 25.5* 24.3*   MCV 80.7* 83.1 83.8   MCH 24.7* 25.7* 25.9*   MCHC 30.6* 31.0* 30.9*   RDW 74.2* 68.4* 70.6*   PLATELETCT 173 150* 141*   MPV 9.8 9.4 9.9       Recent Labs     07/04/23  1240 07/04/23 2015 07/05/23  0312   SODIUM 118* 120* 121*   POTASSIUM 4.1 4.2 4.0   CHLORIDE 87* 90* 91*   CO2 25 24 23   GLUCOSE 112* 94 86   BUN 11 13 13   CREATININE 2.30* 2.59* 2.68*   CALCIUM 7.2* 7.1* 7.3*                     Imaging  DX-ABDOMEN FOR TUBE PLACEMENT   Final Result      Feeding tube tip at the descending duodenum.      IR-EXTREMITY ANGIOGRAM-UNILATERAL RIGHT    (Results Pending)          Assessment/Plan  * Hypoglycemia- (present on admission)  Assessment & Plan  -brittle hypoglycemia, likely from poor oral intake and inadequate liver gluconeogenesis .    She does not want to go back to NGT feeding.  She refused PEG placement or even TPN in the past.  Overall improved, high C-peptide indicating sufficient endogenous insulin production  Wean off drip as much as possible  Elevated C-peptide indicates significant amount of insulin secretion      DNR (do not  resuscitate)- (present on admission)  Assessment & Plan  Per Ms. Martín Nicole's wishes    Chronic heart failure with reduced ejection fraction (HCC)- (present on admission)  Assessment & Plan  -LVEF 45% on echo 6/18/23  -Compensated, does not appear to be in fluid overload.  -Continue Coreg, Cozaar.    Subclinical hypothyroidism- (present on admission)  Assessment & Plan  -TSH:9.130  -Monitor outpatient    Pulmonary hypertension (HCC)- (present on admission)  Assessment & Plan  -5L O2 baseline. Due to MCTD and and lupus.    Systemic lupus erythematosus with organ system involvement (HCC)- (present on admission)  Assessment & Plan  -Continue chronic medications.  Follows up with outpatient rheumatologist.      Hyponatremia- (present on admission)  Assessment & Plan  Nephrology consulting  Ongoing correction, hemodialysis    Severe protein-calorie malnutrition (HCC)- (present on admission)  Assessment & Plan  This is a clinical diagnosis present upon admission.   She previously had been on enteral feeding by NG tube.  Does not want further NG tube feeding.  GI consulted for consideration of PEG placement once she is medically optimized.  Monitor current oral intake, optimize as much as possible    Anemia of chronic disease, with acute blood loss anemia- (present on admission)  Assessment & Plan  - Related to ESRD, autoimmune disorders, with component of acute blood loss anemia from bleeding AVF.  - Continue to monitor.  Restrictive transfusion strategy.    Immunosuppression (HCC)- (present on admission)  Assessment & Plan  -Due to immunosuppresive medication for lupus    Chronic respiratory failure with hypoxia (HCC)- (present on admission)  Assessment & Plan  -Due to history of pulmonary hypertension.  At baseline 5 L oxygen.    -Continue respiratory support, RT protocol, supplemental oxygen.    ESRD (end stage renal disease) (HCC)- (present on admission)  Assessment & Plan  -On HD M,W,F  -Nephrology  following.    Arteriovenous fistula, acquired (HCC)- (present on admission)  Assessment & Plan  - Had bleeding after a scab popped off.  Now s/p fistulogram, central venogram and venoplasty of central venous stenosis.  -Monitor for any bleeding.    Plan  Continue attempts to optimize glycemic control, wean IV fluids  Ongoing renal support, hemodialysis  Monitor and optimize p.o. intake  Avoid further hypoglycemia  Supportive care  See orders  Patient is has a high medical complexity, complex decision making and is at high risk for complication, morbidity, and mortality.  My total time spent caring for the patient on the day of the encounter was 54 minutes.   This does not include time spent on separately billable procedures/tests.      VTE prophylaxis: SCDs/TEDs    I have performed a physical exam and reviewed and updated ROS and Plan today (7/5/2023). In review of yesterday's note (7/4/2023), there are no changes except as documented above.    Please note that this dictation was created using voice recognition software. I have made every reasonable attempt to correct obvious errors, but I expect that there are errors of grammar and possibly context that I did not discover before finalizing the note.

## 2023-07-05 NOTE — PROGRESS NOTES
HD treatment today per routine order using RUAAVF.Treatment tolerated well.VSS throughout.Net UF removed 3000 ml per patient's request.Report given to primary Rn.

## 2023-07-05 NOTE — CARE PLAN
Problem: Knowledge Deficit - Standard  Goal: Patient and family/care givers will demonstrate understanding of plan of care, disease process/condition, diagnostic tests and medications  Outcome: Progressing     Problem: Pain - Standard  Goal: Alleviation of pain or a reduction in pain to the patient’s comfort goal  Outcome: Progressing   The patient is Watcher - Medium risk of patient condition declining or worsening    Shift Goals  Clinical Goals: stable vitals and blood sugars  Patient Goals: anxiety and pain meds  Family Goals: no family present at this time    Progress made toward(s) clinical / shift goals:  pt reports tolerable pain

## 2023-07-06 NOTE — PROGRESS NOTES
Gastroenterology Progress Note               Author:  Manpreet Lees M.D. Date & Time Created: 7/6/2023 1:01 PM       Patient ID:  Name:             Lily bAdi  YOB: 1995  Age:                 27 y.o.  female  MRN:               [id number]      Interval History:  Stopped by to visit with patient about poor oral nutrition and possibility of placing percutaneous gastrostomy tube as requested by primary service.  Patient was requesting information on the PEG tube.  Her appetite is improved.  She is doing her best to consume liquids.  She is hopeful she can consume enough liquids to avoid feeding tube placement.    Hospital Medications:  Current Facility-Administered Medications   Medication Dose Frequency Provider Last Rate Last Admin    acyclovir (Zovirax) tablet 800 mg  800 mg 5X/DAY Kenneth Palmer M.D.   800 mg at 07/06/23 1140    HYDROmorphone (Dilaudid) injection 1 mg  1 mg Q4HRS PRN Mary Mtz A.P.R.N.   1 mg at 07/06/23 1259    sodium chloride 154 mEq in dextrose 10% 1,000 mL infusion   Continuous Kenneth Palmer M.D. 30 mL/hr at 07/06/23 0042 New Bag at 07/06/23 0042    therapeutic multivitamin-minerals (Theragran-M) tablet 1 Tablet  1 Tablet DAILY Mary Mtz, A.P.R.N.   1 Tablet at 07/05/23 0924    losartan (Cozaar) tablet 25 mg  25 mg DAILY Mary Mtz A.P.R.N.   25 mg at 07/06/23 0608    senna-docusate (Pericolace Or Senokot S) 8.6-50 MG per tablet 2 Tablet  2 Tablet BID Mary Mtz, A.P.R.N.   2 Tablet at 07/06/23 0608    And    polyethylene glycol/lytes (Miralax) PACKET 1 Packet  1 Packet QDAY PRN Mary Mtz A.P.R.N.        And    magnesium hydroxide (Milk Of Magnesia) suspension 30 mL  30 mL QDAY PRN Mary Mtz A.P.R.N.        And    bisacodyl (Dulcolax) suppository 10 mg  10 mg QDAY PRN BRIANNA MarinoP.RJgNJg        OLANZapine (ZyPREXA) tablet 2.5 mg  2.5 mg Q EVENING BRIANNA MarinoP.RJgNJg   2.5 mg  at 07/05/23 1713    thiamine (Vitamin B-1) tablet 100 mg  100 mg DAILY GETACHEW Marino.P.R.N.   100 mg at 07/06/23 0609    mycophenolate (Cellcept) 200 MG/ML susp 500 mg  500 mg QAM GETACHEW aMrino.P.R.N.   500 mg at 07/04/23 0520    hydroxychloroquine (Plaquenil) tablet 200 mg  200 mg QAM GETACHEW Marino.P.R.N.   200 mg at 07/06/23 0608    predniSONE (Deltasone) tablet 5 mg  5 mg DAILY GETACHEW Marino.P.R.N.   5 mg at 07/06/23 0608    escitalopram (Lexapro) tablet 10 mg  10 mg DAILY GETACHEW Marino.P.R.N.   10 mg at 07/06/23 0609    folic acid (Folvite) tablet 1 mg  1 mg DAILY GETACHEW Marino.P.R.N.   1 mg at 07/06/23 0608    carvedilol (Coreg) tablet 3.125 mg  3.125 mg BID WITH MEALS GETACHEW Marino.P.R.N.   3.125 mg at 07/06/23 0633    omeprazole (PriLOSEC) capsule 20 mg  20 mg DAILY GETACHEW Marino.P.R.N.   20 mg at 07/06/23 0608    zinc sulfate (Zincate) capsule 220 mg  220 mg DAILY GETACHEW Marino.P.R.N.   220 mg at 07/04/23 0519    promethazine (Phenergan) tablet 25 mg  25 mg Q6HRS PRN Olu Riggs M.D.   25 mg at 07/01/23 1106    prochlorperazine (Compazine) injection 10 mg  10 mg Q4HRS PRN Olu Riggs M.D.   10 mg at 07/01/23 1137    LORazepam (Ativan) injection 0.5 mg  0.5 mg Q6HRS PRN Olu Riggs M.D.   0.5 mg at 07/05/23 2049    Or    LORazepam (Ativan) tablet 0.5 mg  0.5 mg Q6HRS PRN Olu Riggs M.D.   0.5 mg at 07/06/23 0901    diphenhydrAMINE (Benadryl) injection 25 mg  25 mg Q6HRS PRN AMARILIS MarinoRJgNJg   25 mg at 07/06/23 1259    cloNIDine (Catapres) 0.2 MG/24HR patch 1 Patch  1 Patch Q7 DAYS ZULEIKA ShultzOJg   1 Patch at 06/29/23 2117    hydrALAZINE (Apresoline) injection 10 mg  10 mg Q4HRS PRN ZULEIKA ShultzOJg   10 mg at 07/01/23 1722    dextrose 10 % BOLUS 25 g  25 g Q15 MIN PRN ZULEIKA ShultzOJg 999 mL/hr at 07/04/23 0055 25 g at 07/04/23 0055    ondansetron (Zofran) syringe/vial injection 4 mg  4  mg Q4HRS PRN Shelby Alvarado A.P.R.N.   4 mg at 07/05/23 1145   Last reviewed on 6/29/2023  4:38 PM by Shefali De La Paz       Vital signs:  Weight/BMI: Body mass index is 18.6 kg/m².  /88   Pulse 99   Temp 37.4 °C (99.3 °F) (Temporal)   Resp 18   Wt 50.7 kg (111 lb 12.4 oz)   SpO2 96%   Vitals:    07/06/23 0004 07/06/23 0403 07/06/23 0800 07/06/23 1200   BP: 114/84 (!) 137/103 121/79 122/88   Pulse: 92 98 94 99   Resp: 16 16 17 18   Temp: 36.8 °C (98.2 °F) 37 °C (98.6 °F) 37.2 °C (99 °F) 37.4 °C (99.3 °F)   TempSrc: Temporal Temporal Temporal Temporal   SpO2: 97% 97% 94% 96%   Weight:         Oxygen Therapy:  Pulse Oximetry: 96 %, O2 (LPM): 9, O2 Delivery Device: Nasal Cannula  No intake or output data in the 24 hours ending 07/06/23 1301      Physical Exam:  Physical Exam  Constitutional:       General: She is not in acute distress.     Appearance: She is ill-appearing.   HENT:      Mouth/Throat:      Mouth: Mucous membranes are dry.   Cardiovascular:      Rate and Rhythm: Normal rate.   Abdominal:      Palpations: Abdomen is soft.   Neurological:      Mental Status: She is alert and oriented to person, place, and time.   Psychiatric:         Behavior: Behavior normal.         Thought Content: Thought content normal.           Labs:  Recent Labs     07/05/23 0312 07/05/23  1150 07/06/23  0635   SODIUM 121* 125* 124*   POTASSIUM 4.0 4.1 4.6   CHLORIDE 91* 92* 91*   CO2 23 29 27   BUN 13 8 12   CREATININE 2.68* 1.70* 2.44*   MAGNESIUM 1.4*  --  1.9   PHOSPHORUS 3.7  --  3.3   CALCIUM 7.3* 7.2* 7.4*     Recent Labs     07/03/23  1848 07/03/23  2340 07/05/23  0312 07/05/23  1150 07/06/23  0635   ALTSGPT  --   --  <5  --  5   ASTSGOT  --   --  12  --  14   ALKPHOSPHAT  --   --  106*  --  104*   TBILIRUBIN  --   --  0.4  --  0.5   PREALBUMIN 6.6*  --   --   --   --    GLUCOSE 80   < > 86 87 75    < > = values in this interval not displayed.     Recent Labs     07/04/23  0410 07/05/23  0312  07/06/23  0635   WBC 4.7* 4.8 6.0   ASTSGOT  --  12 14   ALTSGPT  --  <5 5   ALKPHOSPHAT  --  106* 104*   TBILIRUBIN  --  0.4 0.5     Recent Labs     07/04/23  0410 07/05/23  0312 07/06/23  0635   RBC 3.07* 2.90* 2.62*   HEMOGLOBIN 7.9* 7.5* 6.9*   HEMATOCRIT 25.5* 24.3* 22.4*   PLATELETCT 150* 141* 116*     Recent Results (from the past 24 hour(s))   POCT glucose device results    Collection Time: 07/05/23  3:45 PM   Result Value Ref Range    POC Glucose, Blood 92 65 - 99 mg/dL   POCT glucose device results    Collection Time: 07/05/23  8:27 PM   Result Value Ref Range    POC Glucose, Blood 73 65 - 99 mg/dL   POCT glucose device results    Collection Time: 07/06/23 12:47 AM   Result Value Ref Range    POC Glucose, Blood 71 65 - 99 mg/dL   POCT glucose device results    Collection Time: 07/06/23  6:31 AM   Result Value Ref Range    POC Glucose, Blood 71 65 - 99 mg/dL   CBC WITHOUT DIFFERENTIAL    Collection Time: 07/06/23  6:35 AM   Result Value Ref Range    WBC 6.0 4.8 - 10.8 K/uL    RBC 2.62 (L) 4.20 - 5.40 M/uL    Hemoglobin 6.9 (L) 12.0 - 16.0 g/dL    Hematocrit 22.4 (L) 37.0 - 47.0 %    MCV 85.5 81.4 - 97.8 fL    MCH 26.3 (L) 27.0 - 33.0 pg    MCHC 30.8 (L) 32.2 - 35.5 g/dL    RDW 71.6 (H) 35.9 - 50.0 fL    Platelet Count 116 (L) 164 - 446 K/uL    MPV 9.5 9.0 - 12.9 fL   Comp Metabolic Panel    Collection Time: 07/06/23  6:35 AM   Result Value Ref Range    Sodium 124 (L) 135 - 145 mmol/L    Potassium 4.6 3.6 - 5.5 mmol/L    Chloride 91 (L) 96 - 112 mmol/L    Co2 27 20 - 33 mmol/L    Anion Gap 6.0 (L) 7.0 - 16.0    Glucose 75 65 - 99 mg/dL    Bun 12 8 - 22 mg/dL    Creatinine 2.44 (H) 0.50 - 1.40 mg/dL    Calcium 7.4 (L) 8.5 - 10.5 mg/dL    AST(SGOT) 14 12 - 45 U/L    ALT(SGPT) 5 2 - 50 U/L    Alkaline Phosphatase 104 (H) 30 - 99 U/L    Total Bilirubin 0.5 0.1 - 1.5 mg/dL    Albumin 1.8 (L) 3.2 - 4.9 g/dL    Total Protein 7.0 6.0 - 8.2 g/dL    Globulin 5.2 (H) 1.9 - 3.5 g/dL    A-G Ratio 0.3 g/dL   MAGNESIUM     Collection Time: 07/06/23  6:35 AM   Result Value Ref Range    Magnesium 1.9 1.5 - 2.5 mg/dL   PHOSPHORUS    Collection Time: 07/06/23  6:35 AM   Result Value Ref Range    Phosphorus 3.3 2.5 - 4.5 mg/dL   CORRECTED CALCIUM    Collection Time: 07/06/23  6:35 AM   Result Value Ref Range    Correct Calcium 9.2 8.5 - 10.5 mg/dL   ESTIMATED GFR    Collection Time: 07/06/23  6:35 AM   Result Value Ref Range    GFR (CKD-EPI) 27 (A) >60 mL/min/1.73 m 2       Radiology Review:  DX-ABDOMEN FOR TUBE PLACEMENT  Narrative: 6/29/2023 6:45 PM    HISTORY/REASON FOR EXAM:  Tube placement    TECHNIQUE/EXAM DESCRIPTION AND NUMBER OF VIEWS:  1 view(s) of the abdomen.    COMPARISON:  6/27/2023    FINDINGS:  Enteric tube has been placed.    The tip projects over the descending duodenum.    The bowel gas pattern is within normal limits.  Impression: Feeding tube tip at the descending duodenum.        MDM (Data Review):   -Records reviewed and summarized in current documentation  -I personally reviewed and interpreted the laboratory results  -I personally reviewed the radiology images  -I have personally reviewed medications      Hospital Problem List:  Active Hospital Problems    Diagnosis     DNR (do not resuscitate) [Z66]     Chronic heart failure with reduced ejection fraction (HCC) [I50.20]     Hypoglycemia [E16.2]     Subclinical hypothyroidism [E03.8]     Pulmonary hypertension (HCC) [I27.20]     Systemic lupus erythematosus with organ system involvement (HCC) [M32.10]     Hyponatremia [E87.1]     Severe protein-calorie malnutrition (HCC) [E43]     Anemia of chronic disease, with acute blood loss anemia [D63.8]     Immunosuppression (HCC) [D84.9]     Chronic respiratory failure with hypoxia ([facility]) [J96.11]     ESRD (end stage renal disease) ([facility]) [N18.6]     Arteriovenous fistula, acquired ([facility]) [I77.0]        Assessment/Recommendations:    I reviewed percutaneous endoscopic gastrostomy tube with the patient.  Discussed the risks,  benefits, alternatives in detail.  Patient is high risk for sedation.  She remains significantly hyponatremic.  She is hopeful she can consume enough per oral intake to avoid PEG tube placement.  I am supportive of another trial of poor oral intake prior to considering PEG tube placement as the patient is a poor sedation candidate.  Additionally patient would need to have her sodium in the normal range.    GI will sign off at this time.  Please reconsult if needed.    Time spent in chart review, counseling, coordination of care: 30 min

## 2023-07-06 NOTE — PROGRESS NOTES
"Hospital Medicine Daily Progress Note    Date of Service  7/6/2023    Chief Complaint  Lily Nicole is a 27 y.o. female admitted 6/29/2023 with low blood sugars.    Hospital Course  Lily Nicole is a 27 y.o. female who presents to ED on 6/29/2023 for hypoglycemia and dizziness. 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 Her mother Chikis is her medical decision maker.  She had recent admission to [facility] on 6/14 for acute blood loss anemia from bleeding fistula and had complicated course with patient ultimately having NG tube placed for severe malnutrition. Dr. Fortune with psychiatry did advise during that admission that patient was incapacitated to make medical or placement decisions. Palliative care did follow along and hospice was discussed as an option for discharge, however family opted to have patient DC home on 6/28 with NG tube in place for tube feedings.   Apparently her feeding tube supplies were not delivered to her home and she now returns to the ED with glucose 55, diffuse pain, and weakness. In ED she is afebrile, on 7L O2 up from baseline 5L, reports \"pain everywhere\". She does state she does not want PEG tube. In ED she was treated with morphine for pain and boluses of 50% dextrose. She was admitted to hospitalist service for nutritional support while awaiting outpatient supplies to be delivered.  She was initially managed in the IMCU for hypoglycemia as well as severe hyponatremia but has since been transition to the telemetry floor    Interval Problem Update  Patient seen and examined today.  She continues to be on NS +10% dextrose drip for hypoglycemia " however has been tapered off and is now on 30 cc/hr, blood sugars are in the 70s.  Patient is trying to intake more fluids but states that she has nausea and episodes of vomiting if she tries to consume too much.  She complains of anxiety and diffuse pain  -I reviewed patient's vitals she is currently on 6 L of supplemental oxygen her baseline is 5, she is tachycardic with heart rate in the 90s her blood pressure is stable  -I have reviewed her labs her hemoglobin was 6.9 this morning however repeat was 7.4 that she was not transfused, her sodium remains low at 124 and her chloride chloride 91 creatinine is stable at 2.44  -Did have an episode of hypoglycemia this afternoon of 56 she was given hypoglycemic protocol with return blood glucose of 150, I have increased her IV fluid rate slightly, continue to monitor Accu-Cheks closely  -Gastroenterology did see the patient talk about PEG tube patient would like to hold off for now gastroenterology does feel that her sodium needs to be corrected before she is safe for sedation for tube placement        I have discussed this patient's plan of care and discharge plan at IDT rounds today with Case Management, Nursing, Nursing leadership, and other members of the IDT team.    Consultants/Specialty  vascular surgery  GI  nephro  Code Status  DNAR/DNI    Disposition  The patient is not medically cleared for discharge to home or a post-acute facility.      I have placed the appropriate orders for post-discharge needs.    Review of Systems  Review of Systems   Constitutional:  Positive for malaise/fatigue and weight loss. Negative for chills and fever.   HENT: Negative.     Eyes: Negative.    Respiratory: Negative.  Negative for cough.    Cardiovascular: Negative.  Negative for chest pain and palpitations.   Gastrointestinal:  Positive for abdominal pain and nausea. Negative for heartburn and vomiting.   Genitourinary: Negative.  Negative for dysuria and frequency.    Musculoskeletal:  Positive for joint pain and myalgias. Negative for back pain and neck pain.   Skin: Negative.  Negative for itching and rash.   Neurological:  Positive for weakness. Negative for dizziness, focal weakness and headaches.   Endo/Heme/Allergies: Negative.  Negative for polydipsia. Does not bruise/bleed easily.   Psychiatric/Behavioral:  Negative for depression. The patient is nervous/anxious.         Physical Exam  Temp:  [36.8 °C (98.2 °F)-37.4 °C (99.3 °F)] 37.4 °C (99.3 °F)  Pulse:  [92-99] 99  Resp:  [16-18] 18  BP: (114-137)/() 122/88  SpO2:  [94 %-97 %] 96 %    Physical Exam  Vitals and nursing note reviewed.   Constitutional:       Appearance: She is ill-appearing and toxic-appearing.      Comments: Severely cachectic   HENT:      Head:      Comments: Temporal wasting     Mouth/Throat:      Mouth: Mucous membranes are dry.   Cardiovascular:      Rate and Rhythm: Tachycardia present.   Pulmonary:      Breath sounds: Rhonchi present.      Comments: Nasal cannula oxygen  Musculoskeletal:      Comments: Very low muscle mass  + fistula right upper extremity   Skin:     Coloration: Skin is pale.   Neurological:      General: No focal deficit present.      Mental Status: She is alert.      Motor: Weakness present.   Psychiatric:      Comments: Flat affect         Fluids  No intake or output data in the 24 hours ending 07/06/23 1630      Laboratory  Recent Labs     07/04/23  0410 07/05/23  0312 07/06/23  0635 07/06/23  1250   WBC 4.7* 4.8 6.0  --    RBC 3.07* 2.90* 2.62*  --    HEMOGLOBIN 7.9* 7.5* 6.9* 7.4*   HEMATOCRIT 25.5* 24.3* 22.4* 24.6*   MCV 83.1 83.8 85.5  --    MCH 25.7* 25.9* 26.3*  --    MCHC 31.0* 30.9* 30.8*  --    RDW 68.4* 70.6* 71.6*  --    PLATELETCT 150* 141* 116*  --    MPV 9.4 9.9 9.5  --      Recent Labs     07/05/23  0312 07/05/23  1150 07/06/23  0635   SODIUM 121* 125* 124*   POTASSIUM 4.0 4.1 4.6   CHLORIDE 91* 92* 91*   CO2 23 29 27   GLUCOSE 86 87 75   BUN 13 8 12    CREATININE 2.68* 1.70* 2.44*   CALCIUM 7.3* 7.2* 7.4*                   Imaging  DX-ABDOMEN FOR TUBE PLACEMENT   Final Result      Feeding tube tip at the descending duodenum.      IR-EXTREMITY ANGIOGRAM-UNILATERAL RIGHT    (Results Pending)          Assessment/Plan  * Hypoglycemia- (present on admission)  Assessment & Plan  -brittle hypoglycemia, likely from poor oral intake and inadequate liver gluconeogenesis .    She does not want to go back to NGT feeding.  She refused PEG placement or even TPN in the past.  Overall improved, high C-peptide indicating sufficient endogenous insulin production  Wean off drip as much as possible, did have event of hypoglycemia today with a low 57 I have increased her fluid rate  Continue to encourage oral intake  Continue Accu-Cheks and hypoglycemic protocol  Elevated C-peptide indicates significant amount of insulin secretion      DNR (do not resuscitate)- (present on admission)  Assessment & Plan  Per Ms. Martín Nicole's wishes    Chronic heart failure with reduced ejection fraction (HCC)- (present on admission)  Assessment & Plan  -LVEF 45% on echo 6/18/23  -Compensated, does not appear to be in fluid overload.  -Continue Coreg, Cozaar.    Subclinical hypothyroidism- (present on admission)  Assessment & Plan  -TSH:9.130  -Monitor outpatient    Pulmonary hypertension (HCC)- (present on admission)  Assessment & Plan  -5L O2 baseline. Due to MCTD and and lupus.    Systemic lupus erythematosus with organ system involvement (HCC)- (present on admission)  Assessment & Plan  -Continue chronic medications.  Follows up with outpatient rheumatologist.      Hyponatremia- (present on admission)  Assessment & Plan  Nephrology consulting  Ongoing correction, hemodialysis  This is slowly improving    Severe protein-calorie malnutrition (HCC)- (present on admission)  Assessment & Plan  This is a clinical diagnosis present upon admission.   She previously had been on enteral feeding by NG tube.   Does not want further NG tube feeding.  GI consulted for consideration of PEG placement once she is medically optimized, she declines for now we will need to reconsult if patient does decide for PEG tube  Monitor current oral intake, optimize as much as possible    Anemia of chronic disease, with acute blood loss anemia- (present on admission)  Assessment & Plan  - Related to ESRD, autoimmune disorders, with component of acute blood loss anemia from bleeding AVF.  -Hemoglobin 6.9 this morning however recheck was 7.4, no transfusion was given encourage repeating labs prior to transfusion to avoid volume overload and further possible complications  - Continue to monitor.  Restrictive transfusion strategy.    Immunosuppression (HCC)- (present on admission)  Assessment & Plan  -Due to immunosuppresive medication for lupus    Chronic respiratory failure with hypoxia (HCC)- (present on admission)  Assessment & Plan  -Due to history of pulmonary hypertension.  At baseline 5 L oxygen.    -Continue respiratory support, RT protocol, supplemental oxygen.    Thrombocytopenia (HCC)- (present on admission)  Assessment & Plan  Does have downtrending of platelets over the last couple days, no evidence of bleeding  Patient is not on any anticoagulants, likely from malnutrition as well as medications for lupus  Continue to monitor closely   repeat CBC in a.m.    ESRD (end stage renal disease) (HCC)- (present on admission)  Assessment & Plan  -On HD M,W,F  -Nephrology following.    Arteriovenous fistula, acquired (HCC)- (present on admission)  Assessment & Plan  - Had bleeding after a scab popped off.  Now s/p fistulogram, central venogram and venoplasty of central venous stenosis.  -Monitor for any bleeding.            VTE prophylaxis: SCDs/TEDs    I have performed a physical exam and reviewed and updated ROS and Plan today (7/6/2023). In review of yesterday's note (7/5/2023), there are no changes except as documented  above.    Patient is has a high medical complexity, complex decision making and is at high risk for complication, morbidity, and mortality.  My total time spent caring for the patient on the day of the encounter was 55 minutes.   This does not include time spent on separately billable procedures/tests.

## 2023-07-06 NOTE — ASSESSMENT & PLAN NOTE
Does have downtrending of platelets over the last couple days  - improving, Plt ~120  Patient is not on any anticoagulants, likely from malnutrition as well as medications for lupus  Continue to monitor closely   Monitor CBC

## 2023-07-06 NOTE — PROGRESS NOTES
4 Eyes Skin Assessment Completed by MICHELLE Arreola and MICHELLE Snell.    Head WDL  Ears WDL  Nose WDL  Mouth WDL  Neck WDL  Breast/Chest WDL  Shoulder Blades WDL  Spine Redness and Blanching  (R) Arm/Elbow/Hand WDL  (L) Arm/Elbow/Hand WDL  Abdomen WDL  Groin WDL  Scrotum/Coccyx/Buttocks Pt would not allow me to look at sacrum  (R) Leg Redness  (L) Leg Redness  (R) Heel/Foot/Toe WDL  (L) Heel/Foot/Toe WDL          Devices In Places Tele Box, Blood Pressure Cuff, Pulse Ox, Central Line, and Nasal Cannula      Interventions In Place Gray Ear Foams and Pillows Meplexes on knees    Possible Skin Injury No    Pictures Uploaded Into Epic N/A  Wound Consult Placed N/A  RN Wound Prevention Protocol Ordered No

## 2023-07-06 NOTE — PROGRESS NOTES
Assumed care of PT A&O 4. Pt resting in bed with no signs of labored breathing. On 6L. Tele monitor in place, cardiac rhythm being monitored. Call light within reach, bed in lowest position, upper bed rails up. Pt was updated on plan of care for the day.

## 2023-07-06 NOTE — CARE PLAN
The patient is Stable - Low risk of patient condition declining or worsening    Shift Goals  Clinical Goals: monitor BG  Patient Goals: pain control  Family Goals: FAHAD    Progress made toward(s) clinical / shift goals:    Problem: Knowledge Deficit - Standard  Goal: Patient and family/care givers will demonstrate understanding of plan of care, disease process/condition, diagnostic tests and medications  Outcome: Progressing     Problem: Pain - Standard  Goal: Alleviation of pain or a reduction in pain to the patient’s comfort goal  Outcome: Progressing

## 2023-07-06 NOTE — DISCHARGE PLANNING
HTH/SCP TCN chart review completed. Collaborated with LEXIS Carroll. Current discharge considerations are dc to home with HH services and family support. Note that pt no longer has NG tube and this should no longer be a barrier for HH services. Patient seen at bedside with mother present. She declines any post acute placement and per review/discussion with pt and mother she is not interested in hospice at this time as well and is pursuing tx /HD, etc (noted discussions had been started prior admits and continued this admit as well).     She reports she would like to again dc to home with HH services. She is also agreeable to any appropriate DME/ADs. Given aforementioned, TCN obtained HH and DME choice (for ADs if indicated; pt has home 02 PTA), faxed to DPA and CM/DPA aware (would likely be resumption of care with [facility] services). TCN will continue to follow and collaborate with discharge planning team as additional post acute needs arise. Thank you.    Completed:  Choice obtained: HH, DME (AD if indicated at time of dc)  [facility] referral sent 7/6

## 2023-07-07 NOTE — PROGRESS NOTES
Pharmacy TPN Note for 2023     27 y.o. female on TPN Day # 1      Admission History: Admitted on 2023 for Hypoglycemia [E16.2]    Allergies: Cephalexin, Clindamycin, Hydrocodone, Maxipime [cefepime], Methylprednisolone, Metoprolol,  , Diagnostic x-ray materials, Furosemide, Hydroxyzine hcl, Insulin, Compazine, Fentanyl and related, Metoclopramide, and Tape     Indication for TPN: Indication: Severe malnutrition (No enteral access)    Clinical Considerations for TPN: Clinical Considerations Impacting TPN: Re-feeding syndrome;Fluid restriction;Renal insufficiency      Temp (24hrs), Av.7 °C (98.1 °F), Min:36.3 °C (97.4 °F), Max:37.4 °C (99.3 °F)  Recent Labs     23  0312 23  1150 23  0635 23  0545   SODIUM 121* 125* 124* 124*   POTASSIUM 4.0 4.1 4.6 4.9   CHLORIDE 91* 92* 91* 92*   CO2 23 29 27 25   BUN 13 8 12 20   CREATININE 2.68* 1.70* 2.44* 2.79*   GLUCOSE 86 87 75 142*   CALCIUM 7.3* 7.2* 7.4* 7.2*   ASTSGOT 12  --  14  --    ALTSGPT <5  --  5  --    ALBUMIN 1.7*  --  1.8*  --    TBILIRUBIN 0.4  --  0.5  --    PHOSPHORUS 3.7  --  3.3  --    MAGNESIUM 1.4*  --  1.9 1.8     Accu-Checks  No results for input(s): POCGLUCOSE in the last 72 hours.  Vitals:    23 1130 23 1145 23 1200 23 1316   BP: (!) 185/115 133/54 117/68 (!) 139/99   Weight:         Intake/Output Summary (Last 24 hours) at 2023 1520  Last data filed at 2023 1330  Gross per 24 hour   Intake 1020 ml   Output 3000 ml   Net -1980 ml     Orders Placed This Encounter   Procedures    Diet Order Diet: Renal; Fluid modifications: (optional): 1000 ml Fluid Restriction     Standing Status:   Standing     Number of Occurrences:   1     Order Specific Question:   Diet:     Answer:   Renal [8]     Order Specific Question:   Fluid modifications: (optional)     Answer:   1000 ml Fluid Restriction [7]     TPN for past 72 hours (Show up to 3 orders; newest on the left.)       Start date and time     2023      TPN Adult Central Line [[id number]]    Order Status  Active    Frequency  TPN DAILY       Base    Clinisol  75 g    dextrose 70%  230 g    fat emulsion (soy) 20%  45 g       Additives    potassium phosphate  15 mmol    sodium acetate  100 mEq    sodium chloride  100 mEq    magnesium sulfate  16 mEq    calcium GLUConate  5 mEq    zinc sulfate  5 mg    M.T.E.-4 Tralement  1 mL    M.V.I. Adult  10 mL    thiamine  100 mg    folic acid  1 mg       QS Base    sterile water  158.51 mL       Energy Contribution    Proteins  300 kcal    Dextrose  782 kcal    Lipids  450 kcal    Total  1,532 kcal       Electrolyte Ion Calculated Amount    Sodium  200 mEq    Potassium  22 mEq    Calcium  5 mEq    Magnesium  16 mEq    Aluminum  125 mEq    Phosphate  15 mmol    Chloride  100 mEq    Acetate  163.5 mEq    Chloride: Acetate Ratio  0.61       Other    Total Amino Acid  75 g    Total Amino Acid/kg  1.48 g/kg    Glucose Infusion Rate  3.15 mg/kg/min    Volume  1,320 mL    Rate  55 mL/hr    Dosing Weight  50.7 kg    Infusion Site  Central            TPN Requirements:  Weight Used in TPN Calculation: 50 kg (110 lb 3.7 oz)  Total Protein Needs (g/kg):  (1.2 - 1.5 g/kg)  Total Caloric Needs (kcal):  (1,250 - 1,550)  Total Fluid Needs (mL):  (ESRD, TPN concentrated to lowest rate)     Current TPN Formulation:  % of goal: 100  % kcal as lipids: 29  Grams protein/k.5  Non-protein calories: 1,232  Kcals/k  Total daily calories: 1,530     Comments   Diet Progression: Renal diet, Minimal intake.    Previously on tube feeds, patient has consistently declined replacement of enteral tube (including PEG) for nutrition.   Severe malnutrition, weight loss - Actual body weight is less than ideal   TPN Progression: New start 24 hour CIVI, concentrated as low as possible to 1,320 mL daily (ESRD).   Patient has been receiving D10-NS at varying rates since admission (30 cc/hr - 125 cc/hr) + some oral intake - Initiating TPN at  goal.   Pharmacist will supplement electrolytes as needed outside TPN if patient demonstrates refeeding.   Acid Suppression: Omeprazole PO   Additional: TPN at NS (0.9%) equivalence. Thiamine 100 mg, Folic acid 1 mg, Zinc sulfate 5 mg in TPN, MVI w/minerals in TPN.   Ionized calcium tomorrow     Macronutrients  20% Protein   06/14/23 06/30/23 07/03/23   Pre-Albumin 5.0 (L) 6.7 (L) 6.6 (L)     51% Carbohydrates, Blood glucose < 150 mg/dL, GIR 3.4 mg/Kg/min  29% Fat, Triglycerides 120 mg/dL on 6/27/23.     Lucinda Brock, PharmD

## 2023-07-07 NOTE — PROGRESS NOTES
[facility] Progress Note     Hemodialysis treatment ordered today per Dr. Digna Millan x 3  hours.   Pt keep on 1:1 special contact isolation for shingles during tx; pt awake and oriented x 3-4; on 6LPM oxygen via nasal cannula ; for 1 unit pRBC transfusion.  Treatment initiated at 1010 completed tx at 1310.      Patient tolerated tx ; medicated for pain by primary RN during tx; see electronic flow sheet for details.      Net UF  2000 mL.   Received 1 unit pRBC during tx; tolerated no adverse reaction noted      Needles removed from access site. Dressings applied and sites held x 10 minutes; verified no bleeding. Positive bruit/thrill post tx.   Staff RN to monitor AVF/G for breakthrough bleeding. Should breakthrough bleeding occur, staff RN to apply pressure to access sites until bleeding resolved.   Notify Dialysis and Nephrologist for follow-up.     Report given to Primary RN Stas Tang.

## 2023-07-07 NOTE — PROGRESS NOTES
Patient is alert and oriented x4. Drowsy but easily arousable. Family at bedside. 6L nasal cannula. IV fluids running. Anuric. Right fistula with small amount of bleeding noted. Patient in no signs of distress at this time. Bed at lowest position. Call light within reach.

## 2023-07-07 NOTE — PROGRESS NOTES
[facility] Progress Note  Pt reported she was newly diagnosed of shingles on the right side of her back, per Lai from infection control, if lesions are localized not oozing and able to cover t with gauze , no need to put Pt on airborne precaution but still recommended to put on private room/ protective iso with standard PPE. Rik, primary RN notified.

## 2023-07-07 NOTE — DISCHARGE PLANNING
"HTH/SCP TCN chart review completed. Collaborated with LEXIS Carroll. Current discharge considerations are dc to home with HH services and family support. As prior, note that pt no longer has NG tube and this should no longer be a barrier for HH services. Also noted per GI MD note from 7/6, \"Patient was requesting information on the PEG tube.  Her appetite is improved.  She is doing her best to consume liquids.  She is hopeful she can consume enough liquids to avoid feeding tube placement\". No new TCN needs identified at this time. Noted she has declined any post acute placement and per review/discussion with pt and mother she is not interested in hospice at this time as well and is pursuing tx /HD, etc (noted discussions had been started prior admits and continued this admit as well). TCN will continue to follow and collaborate with discharge planning team as additional post acute needs arise. Thank you.        Completed:  Choice obtained: JORGITO, SHASHANK (AD if indicated at time of dc) -> anticipating resumption of HH services with [facility] at time of dc  [facility] referral sent 7/6  "

## 2023-07-07 NOTE — PROCEDURES
Diagnosis: End-Stage Renal Disease. Patient seen and examined on hemodialysis during treatment. Patient is stable, tolerating hemodialysis. Denies chest pain and shortness of breath. Orders updated as needed. Please refer to flowsheet for full details.    Access: R AVF  UF goal: 2-3 kg    Plan: Continue routine hemodialysis for ongoing clearance and volume needs.  Supportive nutrition evaluation.       Digna Millan MD  Nephrology   [facility]

## 2023-07-07 NOTE — PROGRESS NOTES
"Hospital Medicine Daily Progress Note    Date of Service  7/7/2023    Chief Complaint  Lily Nicole is a 27 y.o. female admitted 6/29/2023 with low blood sugars.    Hospital Course  Lily Nicole is a 27 y.o. female who presents to ED on 6/29/2023 for hypoglycemia and dizziness. 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 Her mother Chikis is her medical decision maker.  She had recent admission to [facility] on 6/14 for acute blood loss anemia from bleeding fistula and had complicated course with patient ultimately having NG tube placed for severe malnutrition. Dr. Fortune with psychiatry did advise during that admission that patient was incapacitated to make medical or placement decisions. Palliative care did follow along and hospice was discussed as an option for discharge, however family opted to have patient DC home on 6/28 with NG tube in place for tube feedings.   Apparently her feeding tube supplies were not delivered to her home and she now returns to the ED with glucose 55, diffuse pain, and weakness. In ED she is afebrile, on 7L O2 up from baseline 5L, reports \"pain everywhere\". She does state she does not want PEG tube. In ED she was treated with morphine for pain and boluses of 50% dextrose. She was admitted to hospitalist service for nutritional support while awaiting outpatient supplies to be delivered.  She was initially managed in the IMCU for hypoglycemia as well as severe hyponatremia but has since been transition to the telemetry floor    Interval Problem Update  Patient seen and examined today. Family at bedside, just returned from HD. Feels \"okay\" today.   - " hypoglycemic overnight x 2 in last 24 hours, down to 28, required D50. IVF with dextrose increased from 50 cc/hr to 83 cc/hr   - had discussion with both patient and her family today, discussed concerns for ongoing hypoglycemia and options. Pt is currently not eating enough to maintain adequate nutrition/BG levels. She is open to TPN. She does have a femoral central line. I discussed that this is not a permanent option and that she still needs to work on her oral intake, her and family are in agreement.   - TPN discussed with pharmacy and ordered   - Hgb low again this AM 6.4, some bleeding noted from fistula. I have ordered 1 U of PRBC.       I have discussed this patient's plan of care and discharge plan at IDT rounds today with Case Management, Nursing, Nursing leadership, and other members of the IDT team.    Consultants/Specialty  vascular surgery  GI  nephro  Code Status  DNAR/DNI    Disposition  The patient is not medically cleared for discharge to home or a post-acute facility.      I have placed the appropriate orders for post-discharge needs.    Review of Systems  Review of Systems   Constitutional:  Positive for malaise/fatigue and weight loss. Negative for chills and fever.   HENT: Negative.     Eyes: Negative.    Respiratory: Negative.  Negative for cough.    Cardiovascular: Negative.  Negative for chest pain and palpitations.   Gastrointestinal:  Positive for abdominal pain and nausea. Negative for heartburn and vomiting.   Genitourinary: Negative.  Negative for dysuria and frequency.   Musculoskeletal:  Positive for joint pain and myalgias. Negative for back pain and neck pain.   Skin: Negative.  Negative for itching and rash.   Neurological:  Positive for weakness. Negative for dizziness, focal weakness and headaches.   Endo/Heme/Allergies: Negative.  Negative for polydipsia. Does not bruise/bleed easily.   Psychiatric/Behavioral:  Negative for depression. The patient is nervous/anxious.          Physical Exam  Temp:  [36.5 °C (97.7 °F)-37.4 °C (99.3 °F)] 36.6 °C (97.8 °F)  Pulse:  [] 97  Resp:  [16-20] 18  BP: (110-210)/() 117/68  SpO2:  [90 %-99 %] 94 %    Physical Exam  Vitals and nursing note reviewed.   Constitutional:       Appearance: She is ill-appearing and toxic-appearing.      Comments: Severely cachectic   HENT:      Head:      Comments: Temporal wasting     Mouth/Throat:      Mouth: Mucous membranes are dry.   Cardiovascular:      Rate and Rhythm: Tachycardia present.   Pulmonary:      Breath sounds: Rhonchi present.      Comments: Nasal cannula oxygen  Musculoskeletal:      Comments: Very low muscle mass  + fistula right upper extremity   Skin:     Coloration: Skin is pale.   Neurological:      General: No focal deficit present.      Mental Status: She is alert.      Motor: Weakness present.   Psychiatric:      Comments: Flat affect         Fluids    Intake/Output Summary (Last 24 hours) at 7/7/2023 1440  Last data filed at 7/7/2023 1145  Gross per 24 hour   Intake 520 ml   Output --   Net 520 ml         Laboratory  Recent Labs     07/05/23  0312 07/06/23  0635 07/06/23  1250 07/07/23  0545   WBC 4.8 6.0  --  4.6*   RBC 2.90* 2.62*  --  2.41*   HEMOGLOBIN 7.5* 6.9* 7.4* 6.4*   HEMATOCRIT 24.3* 22.4* 24.6* 21.1*   MCV 83.8 85.5  --  87.6   MCH 25.9* 26.3*  --  26.6*   MCHC 30.9* 30.8*  --  30.3*   RDW 70.6* 71.6*  --  75.2*   PLATELETCT 141* 116*  --  127*   MPV 9.9 9.5  --  9.9     Recent Labs     07/05/23  1150 07/06/23  0635 07/07/23  0545   SODIUM 125* 124* 124*   POTASSIUM 4.1 4.6 4.9   CHLORIDE 92* 91* 92*   CO2 29 27 25   GLUCOSE 87 75 142*   BUN 8 12 20   CREATININE 1.70* 2.44* 2.79*   CALCIUM 7.2* 7.4* 7.2*                   Imaging  DX-ABDOMEN FOR TUBE PLACEMENT   Final Result      Feeding tube tip at the descending duodenum.      IR-EXTREMITY ANGIOGRAM-UNILATERAL RIGHT    (Results Pending)          Assessment/Plan  * Hypoglycemia- (present on admission)  Assessment &  Plan  -brittle hypoglycemia, likely from poor oral intake and inadequate liver gluconeogenesis .   She does not want to go back to NGT feeding.  She refused PEG placement   Wean off drip as much as possible,   Persistent hypoglycemic events with titration off D10, down to 28 overnight   IVF increased from 30 cc -> 50 cc >83 cc over last 24 hours   Continue to encourage oral intake  Continue Accu-Cheks and hypoglycemic protocol  Elevated C-peptide indicates significant amount of insulin secretion  Pt open to TPN via her femoral central, I have discussed with pharmacy and ordered     DNR (do not resuscitate)- (present on admission)  Assessment & Plan  Per Ms. Martín Nicole's wishes    Chronic heart failure with reduced ejection fraction (HCC)- (present on admission)  Assessment & Plan  -LVEF 45% on echo 6/18/23  -Compensated, does not appear to be in fluid overload.  -Continue Coreg, Cozaar.    Subclinical hypothyroidism- (present on admission)  Assessment & Plan  -TSH:9.130  -Monitor outpatient    Pulmonary hypertension (HCC)- (present on admission)  Assessment & Plan  -5L O2 baseline. Due to MCTD and and lupus.    Systemic lupus erythematosus with organ system involvement (HCC)- (present on admission)  Assessment & Plan  -Continue chronic medications.  Follows up with outpatient rheumatologist.      Hyponatremia- (present on admission)  Assessment & Plan  Nephrology consulting  Ongoing correction, hemodialysis  This is stable, improved from admission     Severe protein-calorie malnutrition (HCC)- (present on admission)  Assessment & Plan  This is a clinical diagnosis present upon admission.   She previously had been on enteral feeding by NG tube.  Does not want further NG tube feeding.  GI consulted for consideration of PEG placement once she is medically optimized, she declines for now we will need to reconsult if patient does decide for PEG tube  Monitor current oral intake, continue to encourage   Did have  discussion with patient and family regarding my concern for her ongoing malnutrition, after discussion pt is agreeable to TPN  TPN ordered, pt has Rt femoral central line     Anemia of chronic disease, with acute blood loss anemia- (present on admission)  Assessment & Plan  - Related to ESRD, autoimmune disorders, with component of acute blood loss anemia from bleeding AVF.  -Hemoglobin 6.4 this am, 1 unit of PRBC ordered   - Continue to monitor.  Restrictive transfusion strategy.    Immunosuppression (HCC)- (present on admission)  Assessment & Plan  -Due to immunosuppresive medication for lupus    Chronic respiratory failure with hypoxia (HCC)- (present on admission)  Assessment & Plan  -Due to history of pulmonary hypertension.  At baseline 5 L oxygen.    -Continue respiratory support, RT protocol, supplemental oxygen.    Thrombocytopenia (HCC)- (present on admission)  Assessment & Plan  Does have downtrending of platelets over the last couple days  - stable today 7/7  Patient is not on any anticoagulants, likely from malnutrition as well as medications for lupus  Continue to monitor closely   repeat CBC in a.m.    ESRD (end stage renal disease) (HCC)- (present on admission)  Assessment & Plan  -On HD M,W,F  -Nephrology following.    Arteriovenous fistula, acquired (HCC)- (present on admission)  Assessment & Plan  - Had bleeding after a scab popped off.  Now s/p fistulogram, central venogram and venoplasty of central venous stenosis.  -Monitor for any bleeding.            VTE prophylaxis: SCDs/TEDs    I have performed a physical exam and reviewed and updated ROS and Plan today (7/7/2023). In review of yesterday's note (7/6/2023), there are no changes except as documented above.    Patient is has a high medical complexity, complex decision making and is at high Greater than 51 minutes spent prepping to see patient (e.g. review of tests) obtaining and/or reviewing separately obtained history. Performing a medically  appropriate examination and/ evaluation.  Counseling and educating the patient/family/caregiver.  Ordering medications, tests, or procedures.  Referring and communicating with other health care professionals.  Documenting clinical information in EPIC.  Independently interpreting results and communicating results to patient/family/caregiver.  Care coordination.

## 2023-07-07 NOTE — PROGRESS NOTES
HYPOGLYCEMIAINTERVENTION    Patient's blood sugar 23. Currently giving D10 250 ml bolus per orders.     Sugar 145 after bolus.

## 2023-07-07 NOTE — DIETARY
Nutrition Update:    Per GI note, pt's appetite is improving and she is doing her best to consume liquids in the hopes of avoiding feeding tube placement. GI supports another trial of oral intake prior to considering PEG placement as pt is a poor candidate for sedation.   Additionally, hospice not w/in pt and family's POC, current d/c conditions are to d/c to home w/ HH services per care navigator note.    PO in ADL's since last RD update on 7/4 have been 0% x 2 for meals and 25-50% for one snack. RD agreeable to give pt the opportunity to improve PO over the weekend considering appetite is improving. If pt is unable to consistently consume at least 50% of meals and supplements, RD suggests consideration of feeding tube placement.    Recommendations/Plan:  Encourage intake of at least 50% of all meals and supplements.  Document intake of all meals and supplements as % taken in ADL's to provide interdisciplinary communication across all shifts.   Monitor weight.  Nutrition rep will continue to see patient for ongoing meal and snack preferences.       RD following

## 2023-07-08 NOTE — DIETARY
"Nutrition Support TPN Assessment   Day 8 of admit.  Lily Nicole is a 27 y.o. female with admitting DX of hypoglycemia.     Current problem list:  Chronic heart failure with reduced ejection fraction  Hypoglycemia  Subclinical hypothyroidism  Pulmonary hypertension  Systemic lupus erythmatosis with organ system involvement  Hyponatremia  Severe protein-calorie malnutrition  Anemia of chronic disease, with acute blood loss anemia  Immunosuppression  Chronic respiratory failure with hypoxia  Thrombocytopenia  ESRD (end stage renal disease)  Arteriovenous fistula, acquired     Assessment:  Estimated Nutritional Needs: based on:   Height: 165.1 cm (5' 5\")  Weight: 50.7 kg (111 lb 12.4 oz)  Weight to Use in Calculations: 43.5 kg (95 lb 14.4 oz) - admit bed scale wt, consistent with previous wts taken. Pt is currently +3L of fluid  Ideal Body Weight: 56.7 kg (125 lb)  Percent Ideal Body Weight: 76.7  Body mass index is 15.96 kg/m²., BMI classification: underweight    Calculation/Equation: MSJ x 1.2 = 1418 kcals/day  Total Calories / day: 1418 - 1523 (Calories / k - 35)  Total Grams Protein / day: 52 - 61 (Grams Protein / k.2 - 1.4)     Evaluation:   Pt is on a concurrent PO diet. Renal diet ordered, however, per ADL flow sheet, PO has been 0% or refused for meals. Pt consumed recent snack at 25-50%. Overall PO intake has been sub-optimal.   PO was <25% during extended previous admit.   Enteral feeds received during last admit (Fibersource HN started , reached goal rate prior to d/c).  Pt was re-admitted  for hypoglycemia. Pt did not receive tube feeding supplies post discharge?  Pt without enteral access, she is declining NGT replacement. Possible PEG tube placement pending, however pt is not a candidate for surgery at this time.  Palliative MD following pt. Plan for TPN as a bridge to bolster nutrition (and increase sodium levels to WNL) for potential future PEG tube placement.   GI signed off on " pt 7/6.  Nephrology following pt. She continues to receive routine HD.  No new labs today, labs from yesterday: Sodium 124, Glucose 142, Creatinine 2.79  Deltasone, Pericolace, Cellcept, Zofran, Compazine, Pherergan per MAR.  Per pharmacy note, will monitor electrolytes; 100 mg of Thiamine, 1 mg of Folic acid, 5 mg of Zinc Sulfate, and Multi-vitamin with minerals added to TPN.     Malnutrition Risk - diagnosed 6/15, remains appropriate: Pt meets ASPEN criteria for severe malnutrition in the context of chronic illness r/t hx or poor PO intake AEB 21% wt loss x 2 months and severe muscle and fat loss (severe muscle loss in the temple and clavicle region and severe fat loss in the orbital region).      Recommendations/Plan:  TPN per pharmacy.  PO diet per MD.  Fluids per MD.  Tube feeding per MD, as appropriate.  Continue to monitor for refeeding: Order BMP w/ Mg and Phos x 7 days. Replete K, Phos and Mg prn. Continue supplementation per pharmacy/MD to reduce the risk of refeeding.    RD will continue to follow.

## 2023-07-08 NOTE — PROGRESS NOTES
Pharmacy TPN Note for 2023     27 y.o. female on TPN day # 2      Admission History: Admitted on 2023 for Hypoglycemia [E16.2]    Allergies:   Cephalexin, Clindamycin, Hydrocodone, Maxipime [cefepime], Methylprednisolone, Metoprolol,  , Diagnostic x-ray materials, Furosemide, Hydroxyzine hcl, Insulin, Compazine, Fentanyl and related, Metoclopramide, and Tape     Indication for TPN:   Indication: Severe malnutrition       Clinical Considerations for TPN:   Clinical Considerations Impacting TPN: Re-feeding syndrome;Fluid restriction;Renal insufficiency           Temp (24hrs), Av.5 °C (97.7 °F), Min:36.2 °C (97.2 °F), Max:36.7 °C (98.1 °F)    Recent Labs     23  0635 23  0545 23  0900   SODIUM 124* 124* 126*   POTASSIUM 4.6 4.9 3.9   CHLORIDE 91* 92* 92*   CO2 27 25 27   BUN 12 20 19   CREATININE 2.44* 2.79* 2.48*   GLUCOSE 75 142* 91   CALCIUM 7.4* 7.2* 7.6*   ASTSGOT 14  --  15   ALTSGPT 5  --  <5   ALBUMIN 1.8*  --  1.8*   TBILIRUBIN 0.5  --  0.5   PHOSPHORUS 3.3  --  3.2   MAGNESIUM 1.9 1.8 1.8     Accu-Checks  No results for input(s): POCGLUCOSE in the last 72 hours.    Vitals:    23 1929 23 2335 23 0756 23 1248   BP: (!) 129/99 (!) 144/109 124/88 125/88   Weight:       Height:           Intake/Output Summary (Last 24 hours) at 2023 1306  Last data filed at 2023 2300  Gross per 24 hour   Intake 710 ml   Output 3000 ml   Net -2290 ml       Orders Placed This Encounter   Procedures    Diet Order Diet: Renal; Fluid modifications: (optional): 1000 ml Fluid Restriction     Standing Status:   Standing     Number of Occurrences:   1     Order Specific Question:   Diet:     Answer:   Renal [8]     Order Specific Question:   Fluid modifications: (optional)     Answer:   1000 ml Fluid Restriction [7]       TPN for past 72 hours (Show up to 3 orders; newest on the left.)       Start date and time    2023      TPN Adult Central Line [[id number]]    Order  Status  Active    Last Admin  New Bag at 2023 1943 by Gina Amezcua R.N.    Frequency  TPN DAILY       Base    Clinisol  75 g    dextrose 70%  230 g    fat emulsion (soy) 20%  45 g       Additives    potassium phosphate  15 mmol    sodium acetate  100 mEq    sodium chloride  100 mEq    magnesium sulfate  16 mEq    calcium GLUConate  5 mEq    zinc sulfate  5 mg    M.T.E.-4 Tralement  1 mL    M.V.I. Adult  10 mL    thiamine  100 mg    folic acid  1 mg       QS Base    sterile water  158.51 mL       Energy Contribution    Proteins  300 kcal    Dextrose  782 kcal    Lipids  450 kcal    Total  1,532 kcal       Electrolyte Ion Calculated Amount    Sodium  200 mEq    Potassium  22 mEq    Calcium  5 mEq    Magnesium  16 mEq    Aluminum  125 mEq    Phosphate  15 mmol    Chloride  100 mEq    Acetate  163.5 mEq    Chloride: Acetate Ratio  0.61       Other    Total Amino Acid  75 g    Total Amino Acid/kg  1.48 g/kg    Glucose Infusion Rate  3.15 mg/kg/min    Volume  1,320 mL    Rate  55 mL/hr    Dosing Weight  50.7 kg    Infusion Site  Central            TPN Requirements:  Weight Used in TPN Calculation: 50 kg (110 lb 3.7 oz)  Total Protein Needs (g/kg):  (1.2 - 1.5 g/kg)  Total Caloric Needs (kcal):  (1,250 - 1,550)  Total Fluid Needs (mL):  (ESRD, TPN concentrated to lowest rate)     Current TPN Formulation:  % of goal: 100  % kcal as lipids: 29  Grams protein/k.5  Non-protein calories: 1,232  Kcals/k  Total daily calories: 1,530     Comments:  1) Nutritional Plan: no changes today, consider higher protein goal given age and ESRD  2) Labs: No evidence of refeeding.   3) Fluids/additives: may need to reduce K/Mag in TPN in days to come due to ESRD  4) Changes to formulation: no changes today, have not seen full effect of this tpn as started last night.   5) Next labs/note: hypoglycemia today after TPN was made, given D50. Consider increasing dextrose/protein in TPN tomorrow.    Pietro Medrano, PharmD,  BCPS

## 2023-07-08 NOTE — PROGRESS NOTES
"Hospital Medicine Daily Progress Note    Date of Service  7/8/2023    Chief Complaint  Lily Nicole is a 27 y.o. female admitted 6/29/2023 with low blood sugars.    Hospital Course  Lily Nicole is a 27 y.o. female who presents to ED on 6/29/2023 for hypoglycemia and dizziness. 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 Her mother Chikis is her medical decision maker.  She had recent admission to [facility] on 6/14 for acute blood loss anemia from bleeding fistula and had complicated course with patient ultimately having NG tube placed for severe malnutrition. Dr. Fortune with psychiatry did advise during that admission that patient was incapacitated to make medical or placement decisions. Palliative care did follow along and hospice was discussed as an option for discharge, however family opted to have patient DC home on 6/28 with NG tube in place for tube feedings.   Apparently her feeding tube supplies were not delivered to her home and she now returns to the ED with glucose 55, diffuse pain, and weakness. In ED she is afebrile, on 7L O2 up from baseline 5L, reports \"pain everywhere\". She does state she does not want PEG tube. In ED she was treated with morphine for pain and boluses of 50% dextrose. She was admitted to hospitalist service for nutritional support while awaiting outpatient supplies to be delivered.  She was initially managed in the IMCU for hypoglycemia as well as severe hyponatremia but has since been transition to the telemetry floor    Interval Problem Update  Patient seen at beside. No new complaints.  Still complaining of some back pain associated with her " shingles she was started on Lyrica yesterday evening.  She continues to have multiple episodes of hypoglycemia over the last 24 hours despite being started on TPN  -Continue with NS D10, will wean as her blood sugars stabilize, continue to encourage oral intake  -1 unit of PRBCs given yesterday we will repeat CBC    I have discussed this patient's plan of care and discharge plan at IDT rounds today with Case Management, Nursing, Nursing leadership, and other members of the IDT team.    Consultants/Specialty  vascular surgery  GI  nephro  Code Status  DNAR/DNI    Disposition  The patient is not medically cleared for discharge to home or a post-acute facility.  Anticipate discharge to: home with close outpatient follow-up    I have placed the appropriate orders for post-discharge needs.    Review of Systems  Review of Systems   Constitutional:  Positive for malaise/fatigue and weight loss. Negative for chills and fever.   HENT: Negative.     Eyes: Negative.    Respiratory: Negative.  Negative for cough.    Cardiovascular: Negative.  Negative for chest pain and palpitations.   Gastrointestinal:  Positive for abdominal pain and nausea. Negative for heartburn and vomiting.   Genitourinary: Negative.  Negative for dysuria and frequency.   Musculoskeletal:  Positive for joint pain and myalgias. Negative for back pain and neck pain.   Skin: Negative.  Negative for itching and rash.   Neurological:  Positive for weakness. Negative for dizziness, focal weakness and headaches.   Endo/Heme/Allergies: Negative.  Negative for polydipsia. Does not bruise/bleed easily.   Psychiatric/Behavioral:  Negative for depression. The patient is nervous/anxious.         Physical Exam  Temp:  [36.2 °C (97.2 °F)-36.6 °C (97.9 °F)] 36.4 °C (97.5 °F)  Pulse:  [] 94  Resp:  [16-21] 16  BP: (124-144)/() 128/78  SpO2:  [90 %-95 %] 95 %    Physical Exam  Vitals and nursing note reviewed.   Constitutional:       Appearance: She is  ill-appearing and toxic-appearing.      Comments: Severely cachectic   HENT:      Head:      Comments: Temporal wasting     Mouth/Throat:      Mouth: Mucous membranes are dry.   Cardiovascular:      Rate and Rhythm: Tachycardia present.   Pulmonary:      Breath sounds: Rhonchi present.      Comments: Nasal cannula oxygen  Musculoskeletal:      Comments: Very low muscle mass  + fistula right upper extremity   Skin:     Coloration: Skin is pale.   Neurological:      General: No focal deficit present.      Mental Status: She is alert.      Motor: Weakness present.   Psychiatric:      Comments: Flat affect         Fluids    Intake/Output Summary (Last 24 hours) at 7/8/2023 1730  Last data filed at 7/7/2023 2300  Gross per 24 hour   Intake 210 ml   Output --   Net 210 ml         Laboratory  Recent Labs     07/06/23  0635 07/06/23  1250 07/07/23  0545   WBC 6.0  --  4.6*   RBC 2.62*  --  2.41*   HEMOGLOBIN 6.9* 7.4* 6.4*   HEMATOCRIT 22.4* 24.6* 21.1*   MCV 85.5  --  87.6   MCH 26.3*  --  26.6*   MCHC 30.8*  --  30.3*   RDW 71.6*  --  75.2*   PLATELETCT 116*  --  127*   MPV 9.5  --  9.9     Recent Labs     07/06/23  0635 07/07/23  0545 07/08/23  0900   SODIUM 124* 124* 126*   POTASSIUM 4.6 4.9 3.9   CHLORIDE 91* 92* 92*   CO2 27 25 27   GLUCOSE 75 142* 91   BUN 12 20 19   CREATININE 2.44* 2.79* 2.48*   CALCIUM 7.4* 7.2* 7.6*                   Imaging  DX-ABDOMEN FOR TUBE PLACEMENT   Final Result      Feeding tube tip at the descending duodenum.      IR-EXTREMITY ANGIOGRAM-UNILATERAL RIGHT    (Results Pending)          Assessment/Plan  * Hypoglycemia- (present on admission)  Assessment & Plan  -brittle hypoglycemia, likely from poor oral intake and inadequate liver gluconeogenesis .   She does not want to go back to NGT feeding.  She refused PEG placement   Wean off drip as much as possible,   Persistent hypoglycemic events with titration off D10, down to 28 overnight   IVF increased from 30 cc -> 50 cc >83 cc over last 24  hours   Continue to encourage oral intake  Continue Accu-Cheks and hypoglycemic protocol  Elevated C-peptide indicates significant amount of insulin secretion  Continues to have episodes of hypoglycemia requiring hypoglycemic protocol continues to be on D10  Patient started on TPN yesterday evening 7/7/2023    DNR (do not resuscitate)- (present on admission)  Assessment & Plan  Per Ms. Martín Nicole's wishes    Chronic heart failure with reduced ejection fraction (HCC)- (present on admission)  Assessment & Plan  -LVEF 45% on echo 6/18/23  -Compensated, does not appear to be in fluid overload.  -Continue Coreg, Cozaar.    Subclinical hypothyroidism- (present on admission)  Assessment & Plan  -TSH:9.130  -Monitor outpatient    Pulmonary hypertension (HCC)- (present on admission)  Assessment & Plan  -5L O2 baseline. Due to MCTD and and lupus.    Systemic lupus erythematosus with organ system involvement (HCC)- (present on admission)  Assessment & Plan  -Continue chronic medications.  Follows up with outpatient rheumatologist.      Hyponatremia- (present on admission)  Assessment & Plan  Nephrology consulting  Ongoing correction, hemodialysis  This is stable, improved from admission     Severe protein-calorie malnutrition (HCC)- (present on admission)  Assessment & Plan  This is a clinical diagnosis present upon admission.   She previously had been on enteral feeding by NG tube.  Does not want further NG tube feeding.  GI consulted for consideration of PEG placement once she is medically optimized, she declines for now we will need to reconsult if patient does decide for PEG tube  Monitor current oral intake, continue to encourage   Did have discussion with patient and family regarding my concern for her ongoing malnutrition, after discussion pt is agreeable to TPN  Continue TPN via femoral central access, (started 7/7)    Anemia of chronic disease, with acute blood loss anemia- (present on admission)  Assessment &  Plan  - Related to ESRD, autoimmune disorders, with component of acute blood loss anemia from bleeding AVF.  -Hemoglobin 6.4 1 unit of PRBC given 7/7/2023  - Continue to monitor.  Restrictive transfusion strategy.  Repeat CBC in a.m.    Immunosuppression (HCC)- (present on admission)  Assessment & Plan  -Due to immunosuppresive medication for lupus    Chronic respiratory failure with hypoxia (HCC)- (present on admission)  Assessment & Plan  -Due to history of pulmonary hypertension.  At baseline 5 L oxygen.    -Continue respiratory support, RT protocol, supplemental oxygen.    Thrombocytopenia (HCC)- (present on admission)  Assessment & Plan  Does have downtrending of platelets over the last couple days  - improving, Plt ~120  Patient is not on any anticoagulants, likely from malnutrition as well as medications for lupus  Continue to monitor closely   repeat CBC in a.m.    ESRD (end stage renal disease) ([facility])- (present on admission)  Assessment & Plan  -On HD M,W,F  -Nephrology following.    Arteriovenous fistula, acquired ([facility])- (present on admission)  Assessment & Plan  - Had bleeding after a scab popped off.  Now s/p fistulogram, central venogram and venoplasty of central venous stenosis.  -Monitor for any bleeding.            VTE prophylaxis: SCDs/TEDs    I have performed a physical exam and reviewed and updated ROS and Plan today (7/8/2023). In review of yesterday's note (7/7/2023), there are no changes except as documented above.    Patient is has a high medical complexity, complex decision making and is at high Greater than 51 minutes spent prepping to see patient (e.g. review of tests) obtaining and/or reviewing separately obtained history. Performing a medically appropriate examination and/ evaluation.  Counseling and educating the patient/family/caregiver.  Ordering medications, tests, or procedures.  Referring and communicating with other health care professionals.  Documenting clinical information in  EPIC.  Independently interpreting results and communicating results to patient/family/caregiver.  Care coordination.

## 2023-07-08 NOTE — PROGRESS NOTES
Telemetry Shift Summary     Rhythm:SR to ST  HR:   Ectopy: -    Measurements: 0.16/0.07/0.34              Normal values:   Rhythm: SR  HR:   Measurements: 0.12-0.20/0.08-0.10/0.30-0.52

## 2023-07-08 NOTE — CARE PLAN
Patient A/O x4; V/S stable; Afebrile; Pt complained of generalized pain; relieved by IV dilaudid; IV diphenhydramine given as requested; Pt started on TPN; Pt initial blood glucose 67 mg/dl; given IV  as ordered; repeat blood sugar at 12mn was 157 mg/dl; Pt refused Acyclovir; pt verbalized she is having side effects like hallucinations every time she takes medications; handed over to incoming nurse; Pt had a dialysis yesterday; and had a fistula; Pt also has femoral central line; safety measures in placed; advised to use call light    Patient requested to take all her morning pills between 8 am to 9 am; Medications moved between 8 to 9 as per pts request;      The patient is Stable - Low risk of patient condition declining or worsening    Shift Goals  Clinical Goals: monitor blood glucose; nutrition  Patient Goals: safety and comfort  Family Goals: FAHAD    Progress made toward(s) clinical / shift goals:    Problem: Knowledge Deficit - Standard  Goal: Patient and family/care givers will demonstrate understanding of plan of care, disease process/condition, diagnostic tests and medications  Outcome: Progressing     Problem: Fall Risk  Goal: Patient will remain free from falls  Outcome: Progressing     Problem: Pain - Standard  Goal: Alleviation of pain or a reduction in pain to the patient’s comfort goal  Outcome: Progressing     Problem: Skin Integrity  Goal: Skin integrity is maintained or improved  Outcome: Progressing     Problem: Nutrition  Goal: Patient's nutritional and fluid intake will be adequate or improve  Outcome: Progressing  Goal: Enteral nutrition will be maintained or improve  Outcome: Progressing     Problem: Gastrointestinal Irritability  Goal: Nausea and vomiting will be absent or improve  Outcome: Progressing       Patient is not progressing towards the following goals:

## 2023-07-08 NOTE — PROGRESS NOTES
Nephrology Progress Note    Date of Service  7/8/2023    Referring Physician  Herbert Gonzalez M.D.    Consulting Physician  Digna Millan M.D.    Reason for Consultation  Dialysis needs    History of Presenting Illness  27 y.o. female who presented 6/29/2023 with stage renal disease on hemodialysis who presents with failure to thrive, hypoglycemia.      Subjective   7/3: overnight- Noted to have sodium of 113.  Anticipated HD planned for today.    7/4: feeling better, eating breakfast.  Denies nausea/vomiting.     7/8: Remains hypoglycemic, reports having improved appetite. Continues to have itching and intertmittent back pain.  Review of Systems  ROS    Medications  Current Facility-Administered Medications   Medication Dose Route Frequency Provider Last Rate Last Admin    acyclovir (Zovirax) capsule 200 mg  200 mg Oral BID Preethi Rudolph M.D.        acyclovir (Zovirax) tablet 400 mg  400 mg Oral QDAY PRN Preethi Rudolph M.D.        dextrose 50% (D50W) injection 50 mL  50 mL Intravenous PRN Kari Palafox A.P.R.N.   50 mL at 07/08/23 1236    MD Alert...TPN per Pharmacy   Other PHARMACY TO DOSE Preethi Rudolph M.D.        pregabalin (Lyrica) capsule 25 mg  25 mg Oral BID Orin Leavitt D.OJg   25 mg at 07/08/23 0958    TPN Adult Central Line   Intravenous TPN DAILY Preethi Rudolph M.D. 55 mL/hr at 07/07/23 1943 New Bag at 07/07/23 1943    And    sodium chloride 154 mEq in dextrose 10% 1,000 mL infusion   Intravenous PRN Preethi Rudolph M.D.        HYDROmorphone (Dilaudid) injection 1 mg  1 mg Intravenous Q4HRS PRN Mary Mtz A.P.R.N.   1 mg at 07/08/23 1248    losartan (Cozaar) tablet 25 mg  25 mg Oral DAILY Mary Mtz, A.P.R.N.   25 mg at 07/08/23 0957    senna-docusate (Pericolace Or Senokot S) 8.6-50 MG per tablet 2 Tablet  2 Tablet Oral BID Mary Mtz A.P.R.N.   2 Tablet at 07/07/23 1829    And    polyethylene glycol/lytes (Miralax) PACKET 1 Packet  1 Packet Oral QDAY PRN  GETACHEW Marino.P.R.N.        And    magnesium hydroxide (Milk Of Magnesia) suspension 30 mL  30 mL Oral QDAY PRN GETACHEW Marino.P.R.N.        And    bisacodyl (Dulcolax) suppository 10 mg  10 mg Rectal QDAY PRN Mary Mtz A.P.R.N.        OLANZapine (ZyPREXA) tablet 2.5 mg  2.5 mg Oral Q EVENING GETACHEW Marino.P.R.N.   2.5 mg at 07/07/23 1829    mycophenolate (Cellcept) 200 MG/ML susp 500 mg  500 mg Oral QAM GETACHEW Marino.P.R.N.   500 mg at 07/07/23 0540    hydroxychloroquine (Plaquenil) tablet 200 mg  200 mg Oral QAM GETACHEW Marino.P.R.N.   200 mg at 07/08/23 0959    predniSONE (Deltasone) tablet 5 mg  5 mg Oral DAILY Mary Mtz A.P.R.N.   5 mg at 07/08/23 0958    escitalopram (Lexapro) tablet 10 mg  10 mg Oral DAILY GETACHEW Marino.P.R.N.   10 mg at 07/08/23 0957    carvedilol (Coreg) tablet 3.125 mg  3.125 mg Oral BID WITH MEALS GETCAHEW Marino.P.R.N.   3.125 mg at 07/08/23 0815    omeprazole (PriLOSEC) capsule 20 mg  20 mg Oral DAILY GETACHEW Marino.P.R.N.   20 mg at 07/08/23 0958    promethazine (Phenergan) tablet 25 mg  25 mg Oral Q6HRS PRN Olu Riggs M.D.   25 mg at 07/01/23 1106    prochlorperazine (Compazine) injection 10 mg  10 mg Intravenous Q4HRS PRN Olu Riggs M.D.   10 mg at 07/01/23 1137    LORazepam (Ativan) injection 0.5 mg  0.5 mg Intravenous Q6HRS PRN Olu Riggs M.D.   0.5 mg at 07/08/23 0224    Or    LORazepam (Ativan) tablet 0.5 mg  0.5 mg Oral Q6HRS PRN Olu Riggs M.D.   0.5 mg at 07/06/23 0901    diphenhydrAMINE (Benadryl) injection 25 mg  25 mg Intravenous Q6HRS PRN BRIANNA MarinoP.RJgNJg   25 mg at 07/08/23 1017    cloNIDine (Catapres) 0.2 MG/24HR patch 1 Patch  1 Patch Transdermal Q7 DAYS ROGE Shultz.O.   1 Patch at 07/06/23 1623    hydrALAZINE (Apresoline) injection 10 mg  10 mg Intravenous Q4HRS PRN Rivera Gibson D.O.   10 mg at 07/01/23 1722    ondansetron (Zofran)  syringe/vial injection 4 mg  4 mg Intravenous Q4HRS PRN Shelby Alvarado A.P.R.NJg   4 mg at 07/08/23 1017            Physical Exam  Temp:  [36.2 °C (97.2 °F)-36.7 °C (98.1 °F)] 36.4 °C (97.5 °F)  Pulse:  [] 92  Resp:  [16-21] 16  BP: (124-146)/() 125/88  SpO2:  [90 %-94 %] 94 %    Physical Exam  GEN: alert and oriented in no acute distress.chronically ill appearing, pale, thin young woman.  Bitemporal temporal wasting  HEENT: dry oropharygneal mucous membranes and lips  CV:RRR, normal s1 s2  PULM: clear to auscultation bilaterally  ABD: soft non tender non distended  EXT: warm well perfused, no lower extremity edema   Fluids  Date 07/03/23 0700 - 07/04/23 0659   Shift 6183-2674 3512-2847 7907-6224 24 Hour Total   INTAKE   Blood 295   295   IV Piggyback 839.8   839.8   Shift Total 1134.8   1134.8   OUTPUT   Shift Total       Weight (kg) 51.6 51.6 51.6 51.6       Laboratory  Labs reviewed, pertinent labs below.  Recent Labs     07/06/23  0635 07/06/23  1250 07/07/23  0545   WBC 6.0  --  4.6*   RBC 2.62*  --  2.41*   HEMOGLOBIN 6.9* 7.4* 6.4*   HEMATOCRIT 22.4* 24.6* 21.1*   MCV 85.5  --  87.6   MCH 26.3*  --  26.6*   MCHC 30.8*  --  30.3*   RDW 71.6*  --  75.2*   PLATELETCT 116*  --  127*   MPV 9.5  --  9.9       Recent Labs     07/06/23  0635 07/07/23  0545 07/08/23  0900   SODIUM 124* 124* 126*   POTASSIUM 4.6 4.9 3.9   CHLORIDE 91* 92* 92*   CO2 27 25 27   GLUCOSE 75 142* 91   BUN 12 20 19   CREATININE 2.44* 2.79* 2.48*   CALCIUM 7.4* 7.2* 7.6*                  URINALYSIS:  Lab Results   Component Value Date/Time    COLORURINE Yellow 02/15/2021 1108    CLARITY Clear 02/15/2021 1108    SPECGRAVITY 1.015 02/15/2021 1108    PHURINE 8.5 (A) 02/15/2021 1108    KETONES Negative 02/15/2021 1108    PROTEINURIN 30 (A) 02/15/2021 1108    BILIRUBINUR Negative 02/15/2021 1108    UROBILU 0.2 07/16/2020 0900    NITRITE Negative 02/15/2021 1108    LEUKESTERAS Negative 02/15/2021 1108    OCCULTBLOOD Trace (A)  02/15/2021 1108     [facility]  Lab Results   Component Value Date/Time    TOTPROTUR 45.0 (H) 07/16/2020 0900      Lab Results   Component Value Date/Time    CREATININEU 18.93 07/16/2020 0900       Imaging interpreted by radiologist. Imaging reports reviewed with pertinent findings below  DX-ABDOMEN FOR TUBE PLACEMENT   Final Result      Feeding tube tip at the descending duodenum.      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 Trent  - Recommend routine hemodialysis for maintenance clearance and volume needs  - Recommend HD on 07/03/23. Anticipate next HD on 07/10/23.   - Dose all medications for patient dialysis, renal function  - Avoid nephrotoxic agents  - Obtain phosphate level twice weekly.  - Appreciate  Palliative care recommendations, currently DNR.      2. Hyponatermia - Encourage po intake as much as tolerated. Avoid salt tablets. Recommend  nromal saline based medications, infusions. Continue IVF with sodium choride in D10% Continue to be addressed with low sodium bath 130 with HD. Continue to monitor.    3. HTN -Continue coreg 3.125 mg BID, Losartan 25 mg daily. Clonidine 0.2 mg patch.     4. SLE - Continue hydroxycholoroquine. Cellcept. Prednisone 5 mg. Continue lyrica for neuropathic pain.    5. Severe Protein -Calorie Malnutrition - Encourage po intake. TPN.  Appreciate GI recommendations.     6. Anemia of Chronic Kidney Disease- Transfused packed red blood cells maintain hemoglobin greater than 7.    7.  Chronic hypoxic respiratory failure with supplemental oxygen-on home oxygen 5 L.  New supplemental oxygen to maintain SPO2 greater than 92%.  Continue respiratory support.          Digna Millan MD  Nephrology  [facility]

## 2023-07-08 NOTE — PROGRESS NOTES
Inpatient Palliative Medicine - Progress Note     Lily Nicole  27 y.o. female  [id number]     Location: [facility]  PCP: Ella Matthew M.D.  Referral Source: Sandra Gavin     Reason for palliative medicine consultation and/or visit: advance care planning        Assessment and Plan:     GOAL(S) OF CARE = Longevity without resuscitation  7/7 Pt's mother will try to reproduce a [place] POLST form from [facility], for filing & reference.     SYMPTOMS ETIOLOGY/CAUSES = fatigue, nausea, anxiety, hypoglycemia     PROGNOSIS = improved somewhat since admission, but long-term prognosis (months-to-few-years) remains poor     CODE STATUS = DNAR, DNI  7/3/23 - further clarified with patient's mother              PALLIATIVE CARE TEAM INTERVENTIONS =   Trial of Lyrica 25mg PO BID for suspected postherpetic neuralgia, 2/2 vesicular rash behind neck. (Dialysis CrCl=0)  Patient pending TPN trial, with the goal of bridging/partial support to hopefully nourish her better, for a better chance to place PEG later.       Summary:      Met patient and family bedside, and primary Dr. Rudolph also happened to be present.  Patient and family have already agreed to a TPN trial, with the hope of better nutrition support to help her qualify for PEG placement some day, hopefully.  Patient was amenable, and family pleased that at least they still can do something about patient's challenging PO intake situation.    Patient does have a big patch of vesicular rash behind her neck, with constant pain & itches, concerning for Shingles flare-up.  She has been receiving acyclovir since 7/5/23, yet still with significant symptoms.  She inquired if there is anything else we can do, besides her PRN dilaudid, to better control her postherpetic neuralgia.    We reviewed medication choices, and her latest labs and ultimately agreed on a trial of Lyrica 25mg BID, renally dosed,pending further titration.  Informed patient and family that we would  start on low side to avoid excessive fatigue and sedation.  They were agreeable.    Lastly patient's mother informed me that patient has a [place] POLST form from [facility].  She is willing to bring a copy for filing in the coming days.        Advance care planning:  The patient and/or legal decision maker has provided voluntary consent to discuss advance care planning. We discussed code status.  DNAR DNI     Thank you for allowing me the opportunity to participate in the care of Lily Nicole     I spent a total of 35 minutes reviewing medical records, direct face-to-face time with the patient and/or family, documentation and coordination of care. This is separate from the time spent on advance care planning, which is documented above.           DALILA OKEEFE DO (TIM)  [facility]   [id number] [place]  [id number]  P: [phone]  F: [phone]  C: [phone]

## 2023-07-09 NOTE — PROGRESS NOTES
Pharmacy TPN Note for 2023     27 y.o. female on TPN day # 3      Admission History: Admitted on 2023 for Hypoglycemia [E16.2]    Allergies:   Cephalexin, Clindamycin, Hydrocodone, Maxipime [cefepime], Methylprednisolone, Metoprolol,  , Diagnostic x-ray materials, Furosemide, Hydroxyzine hcl, Insulin, Compazine, Fentanyl and related, Metoclopramide, and Tape     Indication for TPN:   Indication: Severe malnutrition       Clinical Considerations for TPN:   Clinical Considerations Impacting TPN: Re-feeding syndrome;Fluid restriction;Renal insufficiency           Temp (24hrs), Av.1 °C (98.7 °F), Min:36.4 °C (97.5 °F), Max:37.6 °C (99.7 °F)    Recent Labs     23  0545 23  0900 23  0456   SODIUM 124* 126* 129*   POTASSIUM 4.9 3.9 3.8   CHLORIDE 92* 92* 93*   CO2 25 27 26   BUN 20 19 32*   CREATININE 2.79* 2.48* 2.90*   GLUCOSE 142* 91 77   CALCIUM 7.2* 7.6* 7.7*   ASTSGOT  --  15 18   ALTSGPT  --  <5 5   ALBUMIN  --  1.8* 1.8*   TBILIRUBIN  --  0.5 0.5   PHOSPHORUS  --  3.2 3.2   MAGNESIUM 1.8 1.8 2.1     Accu-Checks  No results for input(s): POCGLUCOSE in the last 72 hours.    Vitals:    23 2336 23 0357 23 0745 23 1148   BP: 127/78 (!) 124/92 115/82 115/77   Weight:       Height:           Intake/Output Summary (Last 24 hours) at 2023 1154  Last data filed at 2023  Gross per 24 hour   Intake 250 ml   Output --   Net 250 ml       Orders Placed This Encounter   Procedures    Diet Order Diet: Renal; Fluid modifications: (optional): 1000 ml Fluid Restriction     Standing Status:   Standing     Number of Occurrences:   1     Order Specific Question:   Diet:     Answer:   Renal [8]     Order Specific Question:   Fluid modifications: (optional)     Answer:   1000 ml Fluid Restriction [7]       TPN for past 72 hours (Show up to 3 orders; newest on the left. Changes between the two most recent orders are indicated.)       Start date and time    2023   2023      TPN Adult Central Line [[id number]] TPN Adult Central Line [[id number]]    Order Status  Active Last Dose in Progress    Last Admin   New Bag at 2023 by Gina Amezcua R.N.    Frequency  TPN DAILY TPN DAILY       Base    Clinisol  75 g 75 g    dextrose 70%  230 g 230 g    fat emulsion (soy) 20%  45 g 45 g       Additives    potassium phosphate  15 mmol 15 mmol    sodium acetate  50 mEq 100 mEq    sodium chloride  150 mEq 100 mEq    magnesium sulfate  8 mEq 16 mEq    calcium GLUConate  5 mEq 5 mEq    zinc sulfate  5 mg 5 mg    M.T.E.-4 Tralement  1 mL 1 mL    M.V.I. Adult  10 mL 10 mL    thiamine  100 mg 100 mg    folic acid  1 mg 1 mg       QS Base    sterile water  172.98 mL 158.51 mL       Energy Contribution    Proteins  300 kcal 300 kcal    Dextrose  782 kcal 782 kcal    Lipids  450 kcal 450 kcal    Total  1,532 kcal 1,532 kcal       Electrolyte Ion Calculated Amount    Sodium  200 mEq 200 mEq    Potassium  22 mEq 22 mEq    Calcium  5 mEq 5 mEq    Magnesium  8 mEq 16 mEq    Aluminum  125 mEq 125 mEq    Phosphate  15 mmol 15 mmol    Chloride  150 mEq 100 mEq    Acetate  113.5 mEq 163.5 mEq    Chloride: Acetate Ratio  1.32 0.61       Other    Total Amino Acid  75 g 75 g    Total Amino Acid/kg  1.54 g/kg 1.48 g/kg    Glucose Infusion Rate  3.28 mg/kg/min 3.28 mg/kg/min    Volume  1,320 mL 1,320 mL    Rate  55 mL/hr 55 mL/hr    Dosing Weight  48.7 kg 50.7 kg    Infusion Site  [facility]            TPN Requirements:  Weight Used in TPN Calculation: 50 kg (110 lb 3.7 oz)  Total Protein Needs (g/kg): 1.5 g/kg  Total Caloric Needs (kcal): 1532 kcal        Current TPN Formulation:  % of goal: 100  % kcal as lipids: 29  Grams protein/k.5  Non-protein calories: 1232  Kcals/k  Total daily calories: 1532    Comments:  1) Nutritional Plan: Patient remains on renal diet. TPN providing 100% of patient's estimated kcal requirements.  2) Labs: Remains hyponatremic and hypochloremic.  Slightly balanced sodium more towards chloride with keeping at NS equivalence.  3) Fluids/additives: Thiamine, folic acid, and zinc remain in TPN. Can most likely remove tomorrow as would have received three days of therapy.  4) Changes to formulation: Balanced more towards chloride and decreased magnesium slightly.   5) Next labs/note: 7/10    Thank you!    Venus Colmenares, PharmD, BCPS

## 2023-07-09 NOTE — PROGRESS NOTES
"Hospital Medicine Daily Progress Note    Date of Service  7/9/2023    Chief Complaint  Lily Nicole is a 27 y.o. female admitted 6/29/2023 with low blood sugars.    Hospital Course  Lily Nicole is a 27 y.o. female who presents to ED on 6/29/2023 for hypoglycemia and dizziness. 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 Her mother Chikis is her medical decision maker.  She had recent admission to [facility] on 6/14 for acute blood loss anemia from bleeding fistula and had complicated course with patient ultimately having NG tube placed for severe malnutrition. Dr. Fortune with psychiatry did advise during that admission that patient was incapacitated to make medical or placement decisions. Palliative care did follow along and hospice was discussed as an option for discharge, however family opted to have patient DC home on 6/28 with NG tube in place for tube feedings.   Apparently her feeding tube supplies were not delivered to her home and she now returns to the ED with glucose 55, diffuse pain, and weakness. In ED she is afebrile, on 7L O2 up from baseline 5L, reports \"pain everywhere\". She does state she does not want PEG tube. In ED she was treated with morphine for pain and boluses of 50% dextrose. She was admitted to hospitalist service for nutritional support while awaiting outpatient supplies to be delivered.  She was initially managed in the IMCU for hypoglycemia as well as severe hyponatremia but has since been transition to the telemetry floor    Interval Problem Update  Patient seen at beside. More somnolent today, had just got IV pain medications  She continues to have " intermittent hypoglycemia, continue D10 and TPN  - hold plaquenil as it can cause hypoglycemia, monitor for improvement, will try and reach out to pt rheum to see if there are alternatives   -Continue with NS D10, will wean as her blood sugars stabilize, continue to encourage oral intake      I have discussed this patient's plan of care and discharge plan at IDT rounds today with Case Management, Nursing, Nursing leadership, and other members of the IDT team.    Consultants/Specialty  vascular surgery  GI  nephro  Code Status  DNAR/DNI    Disposition  The patient is not medically cleared for discharge to home or a post-acute facility.      I have placed the appropriate orders for post-discharge needs.    Review of Systems  Review of Systems   Constitutional:  Positive for malaise/fatigue and weight loss. Negative for chills and fever.   HENT: Negative.     Eyes: Negative.    Respiratory: Negative.  Negative for cough.    Cardiovascular: Negative.  Negative for chest pain and palpitations.   Gastrointestinal:  Positive for abdominal pain and nausea. Negative for heartburn and vomiting.   Genitourinary: Negative.  Negative for dysuria and frequency.   Musculoskeletal:  Positive for joint pain and myalgias. Negative for back pain and neck pain.   Skin: Negative.  Negative for itching and rash.   Neurological:  Positive for weakness. Negative for dizziness, focal weakness and headaches.   Endo/Heme/Allergies: Negative.  Negative for polydipsia. Does not bruise/bleed easily.   Psychiatric/Behavioral:  Negative for depression. The patient is nervous/anxious.         Physical Exam  Temp:  [37.1 °C (98.7 °F)-37.6 °C (99.7 °F)] 37.6 °C (99.7 °F)  Pulse:  [100-117] 102  Resp:  [16-18] 16  BP: (115-127)/(77-93) 115/77  SpO2:  [92 %-95 %] 93 %    Physical Exam  Vitals and nursing note reviewed.   Constitutional:       Appearance: She is ill-appearing and toxic-appearing.      Comments: Severely cachectic   HENT:      Head:       Comments: Temporal wasting     Mouth/Throat:      Mouth: Mucous membranes are dry.   Cardiovascular:      Rate and Rhythm: Tachycardia present.   Pulmonary:      Breath sounds: Rhonchi present.      Comments: Nasal cannula oxygen  Musculoskeletal:      Comments: Very low muscle mass  + fistula right upper extremity   Skin:     Coloration: Skin is pale.   Neurological:      General: No focal deficit present.      Mental Status: She is alert.      Motor: Weakness present.   Psychiatric:      Comments: Flat affect         Fluids    Intake/Output Summary (Last 24 hours) at 7/9/2023 1645  Last data filed at 7/8/2023 2003  Gross per 24 hour   Intake 250 ml   Output --   Net 250 ml         Laboratory  Recent Labs     07/07/23  0545 07/09/23  0456   WBC 4.6* 4.2*   RBC 2.41* 3.50*   HEMOGLOBIN 6.4* 9.2*   HEMATOCRIT 21.1* 29.7*   MCV 87.6 84.9   MCH 26.6* 26.3*   MCHC 30.3* 31.0*   RDW 75.2* 66.3*   PLATELETCT 127* 88*   MPV 9.9  --      Recent Labs     07/07/23  0545 07/08/23  0900 07/09/23  0456   SODIUM 124* 126* 129*   POTASSIUM 4.9 3.9 3.8   CHLORIDE 92* 92* 93*   CO2 25 27 26   GLUCOSE 142* 91 77   BUN 20 19 32*   CREATININE 2.79* 2.48* 2.90*   CALCIUM 7.2* 7.6* 7.7*                   Imaging  DX-ABDOMEN FOR TUBE PLACEMENT   Final Result      Feeding tube tip at the descending duodenum.      IR-EXTREMITY ANGIOGRAM-UNILATERAL RIGHT    (Results Pending)          Assessment/Plan  * Hypoglycemia- (present on admission)  Assessment & Plan  -brittle hypoglycemia, likely from poor oral intake and inadequate liver gluconeogenesis .   She does not want to go back to NGT feeding.  She refused PEG placement   Wean off drip as much as possible,   Persistent hypoglycemic events with titration off D10, down to 28 overnight   IVF increased from 30 cc -> 50 cc >83 cc over last 24 hours   Continue to encourage oral intake  Continue Accu-Cheks and hypoglycemic protocol  Elevated C-peptide indicates significant amount of insulin  secretion  Continues to have episodes of hypoglycemia requiring hypoglycemic protocol continues to be on D10  Continue blood sugar checks   Patient started on TPN  7/7/2023    DNR (do not resuscitate)- (present on admission)  Assessment & Plan  Per Ms. Martín Nicole's wishes    Chronic heart failure with reduced ejection fraction (HCC)- (present on admission)  Assessment & Plan  -LVEF 45% on echo 6/18/23  -Compensated, does not appear to be in fluid overload.  -Continue Coreg, Cozaar.    Subclinical hypothyroidism- (present on admission)  Assessment & Plan  -TSH:9.130  -Monitor outpatient    Pulmonary hypertension (HCC)- (present on admission)  Assessment & Plan  -5L O2 baseline. Due to MCTD and and lupus.    Systemic lupus erythematosus with organ system involvement (HCC)- (present on admission)  Assessment & Plan  -Continue chronic medications.  Follows up with outpatient rheumatologist.      Hyponatremia- (present on admission)  Assessment & Plan  Nephrology consulting  Ongoing correction, hemodialysis  This is stable, improved from admission     Severe protein-calorie malnutrition (HCC)- (present on admission)  Assessment & Plan  This is a clinical diagnosis present upon admission.   She previously had been on enteral feeding by NG tube.  Does not want further NG tube feeding.  GI consulted for consideration of PEG placement once she is medically optimized, she declines for now we will need to reconsult if patient does decide for PEG tube  Monitor current oral intake, continue to encourage   Did have discussion with patient and family regarding my concern for her ongoing malnutrition, after discussion pt is agreeable to TPN  Continue TPN via femoral central access, (started 7/7)    Anemia of chronic disease, with acute blood loss anemia- (present on admission)  Assessment & Plan  - Related to ESRD, autoimmune disorders, with component of acute blood loss anemia from bleeding AVF.  -Hemoglobin 6.4 1 unit of PRBC  given 7/7/2023  - Continue to monitor.  Restrictive transfusion strategy.  Repeat CBC in a.m.    Immunosuppression (HCC)- (present on admission)  Assessment & Plan  -Due to immunosuppresive medication for lupus    Chronic respiratory failure with hypoxia (HCC)- (present on admission)  Assessment & Plan  -Due to history of pulmonary hypertension.  At baseline 5 L oxygen.    -Continue respiratory support, RT protocol, supplemental oxygen.    Thrombocytopenia (HCC)- (present on admission)  Assessment & Plan  Does have downtrending of platelets over the last couple days  - improving, Plt ~120  Patient is not on any anticoagulants, likely from malnutrition as well as medications for lupus  Continue to monitor closely   repeat CBC in a.m.    ESRD (end stage renal disease) ([facility])- (present on admission)  Assessment & Plan  -On HD M,W,F  -Nephrology following.    Arteriovenous fistula, acquired ([facility])- (present on admission)  Assessment & Plan  - Had bleeding after a scab popped off.  Now s/p fistulogram, central venogram and venoplasty of central venous stenosis.  -Monitor for any bleeding.            VTE prophylaxis: SCDs/TEDs    I have performed a physical exam and reviewed and updated ROS and Plan today (7/9/2023). In review of yesterday's note (7/8/2023), there are no changes except as documented above.

## 2023-07-09 NOTE — PROGRESS NOTES
Telemetry Shift Summary     Rhythm:SR to ST   HR:   Ectopy: -    Measurements: 0.16/0.08/0.36              Normal values:   Rhythm: SR  HR:   Measurements: 0.12-0.20/0.08-0.10/0.30-0.52

## 2023-07-09 NOTE — CARE PLAN
Patient A/O x4; V/S stable; Afebrile; Pt complained of generalized pain; relieved by IV dilaudid; IV diphenhydramine given as requested; IV Ativan given also for anxiety; safety measures in placed; advised to use call light    Patient requested to take all her morning medications at 0900H; explained and educate the patient    The patient is Stable - Low risk of patient condition declining or worsening    Shift Goals  Clinical Goals: monitor blood glucose; hemodynamics; electrolytes  Patient Goals: comfort; safety  Family Goals: FAHAD    Progress made toward(s) clinical / shift goals:    Problem: Knowledge Deficit - Standard  Goal: Patient and family/care givers will demonstrate understanding of plan of care, disease process/condition, diagnostic tests and medications  Outcome: Progressing     Problem: Fall Risk  Goal: Patient will remain free from falls  Outcome: Progressing     Problem: Pain - Standard  Goal: Alleviation of pain or a reduction in pain to the patient’s comfort goal  Outcome: Progressing     Problem: Skin Integrity  Goal: Skin integrity is maintained or improved  Outcome: Progressing     Problem: Nutrition  Goal: Patient's nutritional and fluid intake will be adequate or improve  Outcome: Progressing  Goal: Enteral nutrition will be maintained or improve  Outcome: Progressing     Problem: Gastrointestinal Irritability  Goal: Nausea and vomiting will be absent or improve  Outcome: Progressing       Patient is not progressing towards the following goals:

## 2023-07-10 NOTE — PROGRESS NOTES
[facility] Services Progress Note     Hemodialysis treatment x 3 hours completed as ordered per Dr. Metz.  Treatment performed at bedside started at 1019 and ended at 1320.      Net UF Removed:  1800 mL limited by intradialytic hypotension    Procedure explained, verbalized understanding, consent for treatment verified.  Patient and access assessed pre and post treatment.  Patient tolerated treatment with hypotensive episodes, asymptomatic otherwise.  UF adjusted as blood pressure tolerates  See dialysis electronic flow sheets under media for details.     Post tx access: 16 g cannulation needles removed from RUE AVF access sites, hemostasis established on each insertion site within 10 minutes on venous cannulation site and 20 mins on arterial cannulation site; verified no bleeding. (+) bruit/thrill post treatment. Covered with clean gauze dressings and secured with tape.     Please monitor for RUE AVF access bleeding. Should any breakthrough bleeding occur, please apply gauze and firm pressure on site until bleeding resolves then cover with gauze dressing and tape. Please observe RUE  precautions- NO taking of BPs or blood draws to RUE    Please notify Nephrologist/Dialysis for follow-up.    Report given to primary care nurse GERBER Umana RN.

## 2023-07-10 NOTE — PROGRESS NOTES
Inpatient Palliative Medicine - Follow Up     Lily Nicole  27 y.o. female  [id number]     Location: [facility]  PCP: Ella Matthew M.D.  Referral Source: Sandra Gavin     Reason for palliative medicine consultation and/or visit: advance care planning, symptom management       Assessment and Plan:     GOAL(S) OF CARE = Longevity without resuscitation  7/7 Pt's mother will try to reproduce a [place] POLST form from [facility], for filing & reference.     SYMPTOMS ETIOLOGY/CAUSES = fatigue, nausea, anxiety, hypoglycemia     PROGNOSIS = improved somewhat since admission, but long-term prognosis (months-to-few-years) remains poor     CODE STATUS = DNAR, DNI  7/3/23 - further clarified with patient's mother              PALLIATIVE CARE TEAM INTERVENTIONS =   Discontinued Lyrica for now, due to concerning somnolence.          Nephrology input much appreciated.  Patient assessed and her mother updated bedside.        Summary:   Met patient and her mother Chikis bedside.  Interim events and studies reviewed.  Patient has been increasingly lethargic over the weekend, concerning for Lyrica toxicity.  Nephrology Dr. Daugherty was gracious and shared insights regarding Lyrica use and dosing.  Nephrology recommendations duly noted, and I agree with Lyrica cessation at this time (pending dose reduction at restart if still indicated at that time, 25mg qD vs 25mg thrice weekly post dialysis).    Provided medical updates to patient's mother and endorsed my full agreement with the medication adjustment.  Patient's mother Chikis reports somewhat better pain control over the weekend, but indeed the worsening somnolence is an issue and she was satisfied with the plan going forward at this time.    Patient just finished dialysis and was sleeping at the time of my conversation with her mother.  She will have an extra HD session tomorrow for extra clearance.       Advance care planning:    Thank you for allowing me the  opportunity to participate in the care of Lily Nicole      I spent a total of 25 minutes reviewing medical records, direct face-to-face time with the patient and/or family, documentation and coordination of care. This is separate from the time spent on advance care planning, which is documented above.         [facility]   [address] [place]  [address]  P: [phone]  F: [phone]  C: [phone]

## 2023-07-10 NOTE — PROGRESS NOTES
Pharmacy TPN Note for 7/10/2023     27 y.o. female on TPN day # 4      Admission History: Admitted on 2023 for Hypoglycemia [E16.2]    Allergies:   Cephalexin, Clindamycin, Hydrocodone, Maxipime [cefepime], Methylprednisolone, Metoprolol,  , Diagnostic x-ray materials, Furosemide, Hydroxyzine hcl, Insulin, Compazine, Fentanyl and related, Metoclopramide, and Tape     Indication for TPN:   Indication: Severe malnutrition       Clinical Considerations for TPN:   Clinical Considerations Impacting TPN: Fluid restriction;Renal insufficiency           Temp (24hrs), Av.3 °C (99.2 °F), Min:36.8 °C (98.2 °F), Max:37.6 °C (99.7 °F)    Recent Labs     23  0900 23  0456 07/10/23  0920   SODIUM 126* 129* 130*   POTASSIUM 3.9 3.8 4.0   CHLORIDE 92* 93* 93*   CO2 27 26 28   BUN 19 32* 49*   CREATININE 2.48* 2.90* 3.52*   GLUCOSE 91 77 82   CALCIUM 7.6* 7.7* 7.5*   ASTSGOT 15 18 14   ALTSGPT <5 5 6   ALBUMIN 1.8* 1.8* 1.5*   TBILIRUBIN 0.5 0.5 0.4   PHOSPHORUS 3.2 3.2 3.8   MAGNESIUM 1.8 2.1 2.2   PREALBUMIN  --   --  6.4*     Accu-Checks  No results for input(s): POCGLUCOSE in the last 72 hours.    Vitals:    23 2215 07/10/23 0724 07/10/23 0955 07/10/23 1100   BP:  96/63 106/61 113/76   Weight: 48.7 kg (107 lb 5.8 oz)      Height:           Intake/Output Summary (Last 24 hours) at 7/10/2023 1133  Last data filed at 7/10/2023 1013  Gross per 24 hour   Intake 950 ml   Output --   Net 950 ml       Orders Placed This Encounter   Procedures    Diet Order Diet: Renal; Fluid modifications: (optional): 1000 ml Fluid Restriction     Standing Status:   Standing     Number of Occurrences:   1     Order Specific Question:   Diet:     Answer:   Renal [8]     Order Specific Question:   Fluid modifications: (optional)     Answer:   1000 ml Fluid Restriction [7]       TPN for past 72 hours (Show up to 3 orders; newest on the left. Changes between the two most recent orders are indicated.)       Start date and time     07/10/2023 2000  07/09/2023 2000  07/07/2023 2000      TPN Adult Central Line [[id number]] TPN Adult Central Line [[id number]] TPN Adult Central Line [[id number]]    Order Status  Active Last Dose in Progress Completed    Last Admin   New Bag at 07/09/2023 2003 by Gina Amezcua R.N. New Bag at 07/08/2023 2000 by Gina Amezcua R.N.    Frequency  TPN DAILY TPN DAILY TPN DAILY       Base    Clinisol  75 g 75 g 75 g    dextrose 70%  230 g 230 g 230 g    fat emulsion (soy) 20%  45 g 45 g 45 g       Additives    potassium phosphate  12 mmol 15 mmol 15 mmol    sodium acetate  -- 50 mEq 100 mEq    sodium chloride  200 mEq 150 mEq 100 mEq    magnesium sulfate  8 mEq 8 mEq 16 mEq    calcium GLUConate  5 mEq 5 mEq 5 mEq    zinc sulfate  -- 5 mg 5 mg    M.T.E.-4 Tralement  1 mL 1 mL 1 mL    M.V.I. Adult  10 mL 10 mL 10 mL    thiamine  -- 100 mg 100 mg    folic acid  -- 1 mg 1 mg       QS Base    sterile water  188.68 mL 172.98 mL 158.51 mL       Energy Contribution    Proteins  300 kcal 300 kcal 300 kcal    Dextrose  782 kcal 782 kcal 782 kcal    Lipids  450 kcal 450 kcal 450 kcal    Total  1,532 kcal 1,532 kcal 1,532 kcal       Electrolyte Ion Calculated Amount    Sodium  200 mEq 200 mEq 200 mEq    Potassium  17.6 mEq 22 mEq 22 mEq    Calcium  5 mEq 5 mEq 5 mEq    Magnesium  8 mEq 8 mEq 16 mEq    Aluminum  125 mEq 125 mEq 125 mEq    Phosphate  12 mmol 15 mmol 15 mmol    Chloride  200 mEq 150 mEq 100 mEq    Acetate  63.5 mEq 113.5 mEq 163.5 mEq    Chloride: Acetate Ratio  3.15 1.32 0.61       Other    Total Amino Acid  75 g 75 g 75 g    Total Amino Acid/kg  1.54 g/kg 1.54 g/kg 1.48 g/kg    Glucose Infusion Rate  3.28 mg/kg/min 3.28 mg/kg/min 3.28 mg/kg/min    Volume  1,320 mL 1,320 mL 1,320 mL    Rate  55 mL/hr 55 mL/hr 55 mL/hr    Dosing Weight  48.7 kg 48.7 kg 50.7 kg    Infusion Site  [facility]            TPN Requirements:  Weight Used in TPN Calculation: 50 kg (110 lb 3.7 oz)  Total Protein Needs (g/kg):  1.5 g/kg  Total Caloric Needs (kcal): 1532 kcal        Current TPN Formulation:  % of goal: 100  % kcal as lipids: 29  Grams protein/k.5  Non-protein calories: 1232  Kcals/k  Total daily calories: 1532    Comments:  1) Nutritional Plan: Renal diet remains in place with TPN providing patient with 100% of patient's estimated kcal requirements.  2) Labs: Remains hyponatremic. Continuing with NS equivalence in TPN all with chloride as CO2 continues to increase.  3) Fluids/additives: Removed folic acid, zinc and thiamine as has received three full days of supplements.  4) Changes to formulation: Slight decrease on potassium phosphate and sodium content as described above.   5) Next labs/note:     Thank you!    Venus Colmenares, PharmD, BCPS

## 2023-07-10 NOTE — CARE PLAN
Patient A/O x4; V/S stable; Afebrile; Pt complained of generalized pain; relieved by IV dilaudid; IV diphenhydramine given as requested; Checked blood sugar at around 2230H; Blood sugar 33 mg/dl; given  IV; rechecked blood sugar at 2330; 93 mg/dl; hospitalist informed; Also informed Dr regarding pt's skin issues; medicated lotion ordered; pt could benefit from using hypo allergenic sheets; requested; Pt declined to turn at times; educate pt on benefits of turning; changing positions; Pt on continuous TPN; safety measures in placed; advised to use call light    Pt requested to take all her morning pills at 0900H; educate patient on time and schedule but still requested to move all her morning pills at 0900H    Pt requested to get all her morning labs at 0630H; Lab failed to get bloods; this morning. Messaged the dr if it is okay to get blood from central line even there is TPN running. Awaiting for response. Pt refused to take blood sugar checks this morning due to generalized pain; IV dilaudid given; ice chips offered; and warm compress; advised not to put it directly to skin; use cloth; handed over to incoming nurse the current situation         The patient is Stable - Low risk of patient condition declining or worsening    Shift Goals  Clinical Goals: monitor blood sugar; hemodynamics  Patient Goals: comfort/ safety  Family Goals: FAHAD    Progress made toward(s) clinical / shift goals:    Problem: Knowledge Deficit - Standard  Goal: Patient and family/care givers will demonstrate understanding of plan of care, disease process/condition, diagnostic tests and medications  Outcome: Progressing     Problem: Fall Risk  Goal: Patient will remain free from falls  Outcome: Progressing     Problem: Pain - Standard  Goal: Alleviation of pain or a reduction in pain to the patient’s comfort goal  Outcome: Progressing     Problem: Skin Integrity  Goal: Skin integrity is maintained or improved  Outcome: Progressing      Problem: Nutrition  Goal: Patient's nutritional and fluid intake will be adequate or improve  Outcome: Progressing  Goal: Enteral nutrition will be maintained or improve  Outcome: Progressing     Problem: Gastrointestinal Irritability  Goal: Nausea and vomiting will be absent or improve  Outcome: Progressing       Patient is not progressing towards the following goals:

## 2023-07-10 NOTE — PROGRESS NOTES
Telemetry Shift Summary     Rhythm:ST  HR: 108-111  Ectopy: -    Measurements: 0.18/0.06/0.32              Normal values:   Rhythm: SR  HR:   Measurements: 0.12-0.20/0.08-0.10/0.30-0.52

## 2023-07-10 NOTE — PROCEDURES
Diagnosis: End-Stage Renal Disease admitted with failure to thrive, hypoglycemia. Patient seen and examined on hemodialysis during treatment. Patient is stable, tolerating hemodialysis. Denies chest pain and shortness of breath, but complains of full body pain and being cold. Says her dilaudid and benadryl have been D/C'd. Of note, patient is somnolent and slurring her words. Orders updated as needed. Please refer to flowsheet for full details.    Access: Right arm AVF. Still with suture in place. Suture needs to be removed.   UF goal: 1-2L as tolerated.    Plan: Usual HD is Monday Wednesday Friday. I suspect she has been given too much Lyrica (dose for dialysis patients should start at 25mg thrice weekly after dialysis, or maybe 25mg daily). Will plan on extra HD tomorrow for Lyrica clearance and UF if tolerated.   Re: hypoglycemia, I recommend checking sugars in LEFT arm (non-AV fistula arm).   Re: Failure to thrive, currently on TPN via left femoral CVC. She will need tunneled CVC for TPN if the plan is for long term TPN.     Discussed with Dr. Preethi Rudolph.     Navin Metz MD  Nephrology   [facility]

## 2023-07-11 NOTE — CARE PLAN
The patient is Watcher - Medium risk of patient condition declining or worsening    Shift Goals  Clinical Goals: VSS, MONITOR BLOOD SUGAR AND LAB VALUES, CONTROL PAIN  Patient Goals: PAIN CONTROL AND REST  Family Goals: FAHAD    Progress made toward(s) clinical / shift goals:        Patient is not progressing towards the following goals:      Problem: Knowledge Deficit - Standard  Goal: Patient and family/care givers will demonstrate understanding of plan of care, disease process/condition, diagnostic tests and medications  Description: Target End Date:  1-3 days or as soon as patient condition allows    Document in Patient Education    1.  Patient and family/caregiver oriented to unit, equipment, visitation policy and means for communicating concern  2.  Complete/review Learning Assessment  3.  Assess knowledge level of disease process/condition, treatment plan, diagnostic tests and medications  4.  Explain disease process/condition, treatment plan, diagnostic tests and medications  Outcome: Not Progressing

## 2023-07-11 NOTE — PROCEDURES
Diagnosis: End-Stage Renal Disease admitted with failure to thrive and hypoglycemia. Patient seen and examined on hemodialysis during treatment. Patient is stable, tolerating hemodialysis.  Mental status is slightly improved today, but patient is still slurring her words.  Denies chest pain and shortness of breath. Orders updated as needed. Please refer to flowsheet for full details.    Access: Right arm AV fistula.  Extra suture in place from bleeding episode weeks ago needs to be removed.  UF goal: 0 to 1 L as tolerated    Plan: Today is an extra dialysis for clearance of Lyrica.  Resume usual dialysis Monday Wednesday Friday.  Patient is overloaded, but unable to tolerate much ultrafiltration today due to hypotension.  Prognosis remains grim.  Continue ongoing goals of care discussions.  I appreciate palliative care involvement, patient has not yet accepted end-of-life, and has not yet expressed desire to quit dialysis.    Navin Metz MD  Nephrology   [facility]

## 2023-07-11 NOTE — CONSULTS
Palliative Medicine Triage Note    Repeat consult acknowledged.  This is not necessary.      Palliative care team (myself and HPM fellows) have been following patient since last month.    Likely we will continue following for the rest of patient's predictably short life.      Very complicated clinical picture, as patient is basically in multi-organ failure and also suffering from chronic malnutrition, but adamantly against NGT yet unable to qualify for PEG yet.    Her frail state makes every intervention difficult, and any therapeutic effect limited as well.      Code status DNAR DNI has been hard earned after multiple family communications.      Patient herself has NOT expressed any wish to stop dialysis yet so unfortunately we do not have a good hospice diagnosis yet (nor would it be feasible to continue dialysis on hospice).    Patient herself has NOT accepted end of life yet, per our most recent discussion.      Family dynamic is also difficult, akin to that of a quasi-pediatric case, where patients themselves ascent and parents (mom predominantly) has to consent for difficult decisions.        Palliative care team will continue following and communicate and CLARIFY (but NEVER IMPOSE) goals of care, whatever they might be.    Appreciate all medical specialties involvements and concerns, and efforts in helping patient and her family.    End of life care is a a journey unique to each person their own, whether or not we agree with one professionally ourselves.  As long as it is right for patient and family, and we support them to the best of our abilities, then we have done patient and family justice.    We can and probably should be passionate about our recommendations but ultimately neutral about any outcome.    Thank you all for your assistance for this patient, in her very difficult situation.  Family have always been very appreciative going back to day 1, and ultimately realistic in this difficult  situation.        [facility]   [id number] [place]  [id number]  P: [phone]  F: [phone]  C: [phone]

## 2023-07-11 NOTE — DISCHARGE PLANNING
HTH/[facility] TCN chart review completed. Collaborated with LEXIS Carroll. Current discharge considerations are for discharge to Home with HH services and family support when medically cleared.  Per chart review, patient no longer has NG tube and should no longer be a barrier for HH services. No new TCN needs at this time.  TCN will continue to follow and collaborate with discharge planning team as additional post acute needs arise. Thank you.    Completed:  Choice obtained: HH, DME (AD if indicated at time of dc) -> anticipating resumption of HH services with [facility] at time of dc  [facility] referral sent 7/6

## 2023-07-11 NOTE — PROGRESS NOTES
ASSUMED CARE ON PATIENT POST REPORT RECEIVED AT BEDSIDE, ALERT WATCHING TV AND FAMILY AT BEDSIDE, IN NO APPARENT DISTRESS, REQUESTING MEDICATION FOR ITCHING AND GIVEN (SEE MAR), MONITOR ON AND RHYTHM AND RATE VERIFIED, IV LINES INTACT AND PATENT AND IV MEDS INFUSING WITHOUT DIFFICULTY, SAFETY PRECAUTIONS MAINTAINED, CALL LIGHT IN HAND TO CALL FOR ASSISTANCE, BED TO LOWEST LEVEL, SIDE RAILS UP X 2, WILL CONTINUE TO MONITOR.

## 2023-07-11 NOTE — PROGRESS NOTES
[facility] Services Progress Note         HD today x 3 hours per Dr. Metz.   Tx initiated at 0926 and ended at 1226    Pt A/O x 4, anxious, PCN was made aware, no bleeding, and denies chest pain. With RUE AVF (+) bruit and thrill (+) consent       NET UF: 200 ml     Patient tolerated tx, UFG was not met d/t hypotension with lowest SBP of 80's. NS bolus was given, total of 200 ml. PCN also gave due meds (please see MAR) All blood was returned aseptically. HD needles removed from RUE AVF. Dry gauze applied and changed without bleeding issue.(+) Bruit and thrill pre/post tx. Should breakthrough bleeding occur, staff RN to apply pressure to access sites until bleeding resolved. Notify Dialysis and Nephrologist for follow-up.See eflow sheets for further details.    Report given to VENU Tang RN

## 2023-07-11 NOTE — PROGRESS NOTES
"PATIENT REFUSED ALL DUE MEDS AT THIS TIME AND SAID 'I WILL TAKE THEM WHEN MY MOM GETS HERE\", WILL ENDORSE  "

## 2023-07-11 NOTE — CARE PLAN
The patient is Watcher - Medium risk of patient condition declining or worsening    Shift Goals  Clinical Goals: VSS, MONITOR BLOOD SUGAR AND LAB VALUES, CONTROL PAIN  Patient Goals: PAIN CONTROL AND REST  Family Goals: FAHAD    Progress made toward(s) clinical / shift goals:        Patient is not progressing towards the following goals:      Problem: Knowledge Deficit - Standard  Goal: Patient and family/care givers will demonstrate understanding of plan of care, disease process/condition, diagnostic tests and medications  Description: Target End Date:  1-3 days or as soon as patient condition allows    Document in Patient Education    1.  Patient and family/caregiver oriented to unit, equipment, visitation policy and means for communicating concern  2.  Complete/review Learning Assessment  3.  Assess knowledge level of disease process/condition, treatment plan, diagnostic tests and medications  4.  Explain disease process/condition, treatment plan, diagnostic tests and medications  7/11/2023 0413 by Angy Ceja, R.N.  Outcome: Not Progressing  7/11/2023 0413 by Angy Ceja R.N.  Outcome: Progressing  7/11/2023 0413 by Angy Ceja R.N.  Outcome: Not Progressing  7/11/2023 0412 by Angy Ceja, R.N.  Outcome: Not Progressing     Problem: Fall Risk  Goal: Patient will remain free from falls  Description: Target End Date:  Prior to discharge or change in level of care    Document interventions on the Ibrahim Akira Fall Risk Assessment    1.  Assess for fall risk factors  2.  Implement fall precautions  7/11/2023 0413 by Angy Ceja, R.N.  Outcome: Not Progressing  7/11/2023 0413 by Angy Ceja, R.N.  Outcome: Progressing  7/11/2023 0413 by Angy Ceja, R.N.  Outcome: Not Progressing     Problem: Pain - Standard  Goal: Alleviation of pain or a reduction in pain to the patient’s comfort goal  Description: Target End Date:  Prior to discharge or change in level of care    Document on  Vitals flowsheet    1.  Document pain using the appropriate pain scale per order or unit policy  2.  Educate and implement non-pharmacologic comfort measures (i.e. relaxation, distraction, massage, cold/heat therapy, etc.)  3.  Pain management medications as ordered  4.  Reassess pain after pain med administration per policy  5.  If opiods administered assess patient's response to pain medication is appropriate per POSS sedation scale  6.  Follow pain management plan developed in collaboration with patient and interdisciplinary team (including palliative care or pain specialists if applicable)  7/11/2023 0413 by Angy Ceja R.N.  Outcome: Not Progressing  7/11/2023 0413 by Angy Ceja R.N.  Outcome: Progressing  7/11/2023 0413 by CHANCE BaileyN.  Outcome: Not Progressing     Problem: Skin Integrity  Goal: Skin integrity is maintained or improved  Description: Target End Date:  Prior to discharge or change in level of care    Document interventions on Skin Risk/Ross flowsheet groups and corresponding LDA    1.  Assess and monitor skin integrity, appearance and/or temperature  2.  Assess risk factors for impaired skin integrity and/or pressures ulcers  3.  Implement precautions to protect skin integrity in collaboration with interdisciplinary team  4.  Implement pressure ulcer prevention protocol if at risk for skin breakdown  5.  Confirm wound care consult if at risk for skin breakdown  6.  Ensure patient use of pressure relieving devices  (Low air loss bed, waffle overlay, heel protectors, ROHO cushion, etc)  7/11/2023 0413 by Angy Ceja R.N.  Outcome: Not Progressing  7/11/2023 0413 by JOSE Bailey.N.  Outcome: Progressing  7/11/2023 0413 by Angy Ceja, R.N.  Outcome: Not Progressing     Problem: Nutrition  Goal: Patient's nutritional and fluid intake will be adequate or improve  Description: Target End Date:  Prior to discharge or change in level of care    Document on I/O  flowsheet    1.  Monitor nutritional intake  2.  Monitor weight per provider order  3.  Assess patient's ability to take oral nutrition  4.  Collaborate with Speech Therapy, Dietitian and interdisciplinary team for appropriate feeding and fluid intake  5.  Assist with feeding  7/11/2023 0413 by Angy Ceaj RBRAD.  Outcome: Not Progressing  7/11/2023 0413 by CHANCE BaileyN.  Outcome: Progressing  7/11/2023 0413 by CHANCE BaileyN.  Outcome: Not Progressing  Goal: Enteral nutrition will be maintained or improve  Description: Target End Date:  Prior to discharge or change in level of care    1. Enteral access to be obtained or modified  2. Advance to goal rate per protocol  3. Collaborate with Clinical Dietitian for signs/symptoms of intolerance  4. Weights per provider order  5. Consider Speech Therapy consult for swallowing difficulties  7/11/2023 0413 by Angy Ceja RJgN.  Outcome: Not Progressing  7/11/2023 0413 by CHANCE BaileyN.  Outcome: Progressing  7/11/2023 0413 by Angy Ceja RJgN.  Outcome: Not Progressing     Problem: Gastrointestinal Irritability  Goal: Nausea and vomiting will be absent or improve  Description: Target End Date:  Prior to discharge or change in level of care    Document on I/O and Assessment flowsheets    1.  Assess for history, duration, frequency, severity, and potential precipitating factors  2.  Administer antiemetics as ordered  3.  Emesis basin within easy reach of the patient, but out of sight if psychogenic component  4.  Eliminate strong odors from surroundings  5.  Introduce cold water, ice chips, marj products, and room temperature broth or bouillon if tolerated and appropriate to the patient's diet  6.  Encourage small amounts of bland, simple foods like broth, rice, bananas, Jell-O, crackers or toast if tolerated and appropriate to patient's diet  7.  Position the patient upright while eating and for 1 to 2 hours post-meal  8.  Encourage  nonpharmacological nausea control techniques such as relaxation, guided imagery, music therapy, distraction, or deep breathing exercises  7/11/2023 0413 by Angy Cjea R.N.  Outcome: Not Progressing  7/11/2023 0413 by Angy Ceja R.N.  Outcome: Progressing  7/11/2023 0413 by Angy Ceja R.N.  Outcome: Not Progressing

## 2023-07-11 NOTE — PROGRESS NOTES
"Hospital Medicine Daily Progress Note    Date of Service  7/10/2023    Chief Complaint  Lily Nicole is a 27 y.o. female admitted 6/29/2023 with low blood sugars.    Hospital Course  Lily Nicole is a 27 y.o. female who presents to ED on 6/29/2023 for hypoglycemia and dizziness. 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 Her mother Chikis is her medical decision maker.  She had recent admission to [facility] on 6/14 for acute blood loss anemia from bleeding fistula and had complicated course with patient ultimately having NG tube placed for severe malnutrition. Dr. Fortune with psychiatry did advise during that admission that patient was incapacitated to make medical or placement decisions. Palliative care did follow along and hospice was discussed as an option for discharge, however family opted to have patient DC home on 6/28 with NG tube in place for tube feedings.   Apparently her feeding tube supplies were not delivered to her home and she now returns to the ED with glucose 55, diffuse pain, and weakness. In ED she is afebrile, on 7L O2 up from baseline 5L, reports \"pain everywhere\". She does state she does not want PEG tube. In ED she was treated with morphine for pain and boluses of 50% dextrose. She was admitted to hospitalist service for nutritional support while awaiting outpatient supplies to be delivered.  She was initially managed in the IMCU for hypoglycemia as well as severe hyponatremia but has since been transition to the telemetry floor    Interval Problem Update  Patient seen at beside. Cont. To be somnolent, discontinue lyrica and IV dilaudid.   She continues to " have intermittent hypoglycemia, continue D10 and TPN, BG continues to be intermittently low despite TPN and D10   - cont. To hold plaquinil due to rare side effect of low BG, did speak with pt rheumatologist, no need to substitute alternative at this time. Agrees with palliative hospice planning.   - femoral line is not tunneled, will not be able to go home with TPN via this line unless tunneled.   - discussed with nephro, extra HD tomorrow to dialyze lyrica due to somnolence       -Continue with NS D10, will wean as her blood sugars stabilize, continue to encourage oral intake      I have discussed this patient's plan of care and discharge plan at IDT rounds today with Case Management, Nursing, Nursing leadership, and other members of the IDT team.    Consultants/Specialty  vascular surgery  GI  nephro  Code Status  DNAR/DNI    Disposition  The patient is not medically cleared for discharge to home or a post-acute facility.      I have placed the appropriate orders for post-discharge needs.    Review of Systems  Review of Systems   Constitutional:  Positive for malaise/fatigue and weight loss. Negative for chills and fever.   HENT: Negative.     Eyes: Negative.    Respiratory: Negative.  Negative for cough.    Cardiovascular: Negative.  Negative for chest pain and palpitations.   Gastrointestinal:  Positive for abdominal pain and nausea. Negative for heartburn and vomiting.   Genitourinary: Negative.  Negative for dysuria and frequency.   Musculoskeletal:  Positive for joint pain and myalgias. Negative for back pain and neck pain.   Skin: Negative.  Negative for itching and rash.   Neurological:  Positive for weakness. Negative for dizziness, focal weakness and headaches.   Endo/Heme/Allergies: Negative.  Negative for polydipsia. Does not bruise/bleed easily.   Psychiatric/Behavioral:  Negative for depression. The patient is nervous/anxious.         Physical Exam  Temp:  [36.5 °C (97.7 °F)-37.5 °C (99.5 °F)] 37.2  °C (99 °F)  Pulse:  [] 110  Resp:  [14-18] 18  BP: ()/(41-76) 108/66  SpO2:  [90 %-98 %] 91 %    Physical Exam  Vitals and nursing note reviewed.   Constitutional:       Appearance: She is ill-appearing and toxic-appearing.      Comments: Severely cachectic, somnolent   HENT:      Head:      Comments: Temporal wasting     Mouth/Throat:      Mouth: Mucous membranes are dry.   Cardiovascular:      Rate and Rhythm: Tachycardia present.   Pulmonary:      Breath sounds: Rhonchi present.      Comments: Nasal cannula oxygen  Musculoskeletal:      Comments: Very low muscle mass  + fistula right upper extremity   Skin:     Coloration: Skin is pale.   Neurological:      General: No focal deficit present.      Motor: Weakness present.   Psychiatric:      Comments: somnolent         Fluids    Intake/Output Summary (Last 24 hours) at 7/10/2023 1926  Last data filed at 7/10/2023 1800  Gross per 24 hour   Intake 1210 ml   Output 2300 ml   Net -1090 ml         Laboratory  Recent Labs     07/09/23  0456   WBC 4.2*   RBC 3.50*   HEMOGLOBIN 9.2*   HEMATOCRIT 29.7*   MCV 84.9   MCH 26.3*   MCHC 31.0*   RDW 66.3*   PLATELETCT 88*     Recent Labs     07/08/23  0900 07/09/23  0456 07/10/23  0920   SODIUM 126* 129* 130*   POTASSIUM 3.9 3.8 4.0   CHLORIDE 92* 93* 93*   CO2 27 26 28   GLUCOSE 91 77 82   BUN 19 32* 49*   CREATININE 2.48* 2.90* 3.52*   CALCIUM 7.6* 7.7* 7.5*             Recent Labs     07/10/23  0920   TRIGLYCERIDE 46       Imaging  DX-ABDOMEN FOR TUBE PLACEMENT   Final Result      Feeding tube tip at the descending duodenum.      IR-EXTREMITY ANGIOGRAM-UNILATERAL RIGHT    (Results Pending)          Assessment/Plan  * Hypoglycemia- (present on admission)  Assessment & Plan  -brittle hypoglycemia, likely from poor oral intake and inadequate liver gluconeogenesis .   She does not want to go back to NGT feeding.  She refused PEG placement   Wean off drip as much as possible,   Persistent hypoglycemic events with  titration off D10, down to 28 overnight   IVF increased from 30 cc -> 50 cc >83 cc over last 24 hours   Continue to encourage oral intake  Continue Accu-Cheks and hypoglycemic protocol  Elevated C-peptide indicates significant amount of insulin secretion  Continues to have episodes of hypoglycemia requiring hypoglycemic protocol continues to be on D10  Continue blood sugar checks   Patient started on TPN  7/7/2023  Continues to have hypoglycemia despite D10 and TPN    DNR (do not resuscitate)- (present on admission)  Assessment & Plan  Per Ms. Martín Nicole's wishes    Chronic heart failure with reduced ejection fraction (HCC)- (present on admission)  Assessment & Plan  -LVEF 45% on echo 6/18/23  -Compensated, does not appear to be in fluid overload.  -Continue Coreg, Cozaar.    Subclinical hypothyroidism- (present on admission)  Assessment & Plan  -TSH:9.130  -Monitor outpatient    Pulmonary hypertension (HCC)- (present on admission)  Assessment & Plan  -5L O2 baseline. Due to MCTD and and lupus.    Systemic lupus erythematosus with organ system involvement (HCC)- (present on admission)  Assessment & Plan  -Continue chronic medications.  Stop plaquinil as it can cause hypoglycemia, no need to substitute per my conversation with pt outpatient rheumatologist     Follows up with outpatient rheumatologist.      Hyponatremia- (present on admission)  Assessment & Plan  Nephrology consulting  Ongoing correction, hemodialysis  This is stable, improved from admission     Severe protein-calorie malnutrition (HCC)- (present on admission)  Assessment & Plan  This is a clinical diagnosis present upon admission.   She previously had been on enteral feeding by NG tube.  Does not want further NG tube feeding.  GI consulted for consideration of PEG placement once she is medically optimized, she declines for now we will need to reconsult if patient does decide for PEG tube  Monitor current oral intake, continue to encourage   Did have  discussion with patient and family regarding my concern for her ongoing malnutrition, after discussion pt is agreeable to TPN  Continue TPN via femoral central access, (started 7/7)    Anemia of chronic disease, with acute blood loss anemia- (present on admission)  Assessment & Plan  - Related to ESRD, autoimmune disorders, with component of acute blood loss anemia from bleeding AVF.  -Hemoglobin 6.4 1 unit of PRBC given 7/7/2023  - Continue to monitor.  Restrictive transfusion strategy.  Repeat CBC in a.m.    Immunosuppression (HCC)- (present on admission)  Assessment & Plan  -Due to immunosuppresive medication for lupus    Chronic respiratory failure with hypoxia (HCC)- (present on admission)  Assessment & Plan  -Due to history of pulmonary hypertension.  At baseline 5 L oxygen.    -Continue respiratory support, RT protocol, supplemental oxygen.    Thrombocytopenia (HCC)- (present on admission)  Assessment & Plan  Does have downtrending of platelets over the last couple days  - improving, Plt ~120  Patient is not on any anticoagulants, likely from malnutrition as well as medications for lupus  Continue to monitor closely   Monitor CBC    ESRD (end stage renal disease) (HCC)- (present on admission)  Assessment & Plan  -On HD M,W,F  -Nephrology following.    Arteriovenous fistula, acquired (HCC)- (present on admission)  Assessment & Plan  - Had bleeding after a scab popped off.  Now s/p fistulogram, central venogram and venoplasty of central venous stenosis.  -Monitor for any bleeding.            VTE prophylaxis: SCDs/TEDs    I have performed a physical exam and reviewed and updated ROS and Plan today (7/10/2023). In review of yesterday's note (7/9/2023), there are no changes except as documented above.    Greater than 51 minutes spent prepping to see patient (e.g. review of tests) obtaining and/or reviewing separately obtained history. Performing a medically appropriate examination and/ evaluation.  Counseling and  educating the patient/family/caregiver.  Ordering medications, tests, or procedures.  Referring and communicating with other health care professionals.  Documenting clinical information in EPIC.  Independently interpreting results and communicating results to patient/family/caregiver.  Care coordination.

## 2023-07-12 NOTE — ASSESSMENT & PLAN NOTE
Discussed with Dr. Leavitt  I explained that prolonged TPN and declining PEG would mean hospice may be appropriate and no more dialyiss  They understood and are now considering PEG tube and hopefully C  Mother notified for decision regarding PEG. Also notified staff. If all is a go, will consult GI in the AM

## 2023-07-12 NOTE — PROGRESS NOTES
Inpatient Palliative Medicine  - Follow Up     Lily Nicole  27 y.o. female  [id number]     Location: [facility]  PCP: Ella Matthew M.D.  Referral Source: Sandra Gavin     Reason for palliative medicine consultation and/or visit: advance care planning, symptom management       Assessment and Plan:     GOAL(S) OF CARE = Longevity    SYMPTOMS ETIOLOGY/CAUSES =  fatigue, nausea, anxiety, hypoglycemia, situational depression, anticipatory grief    PROGNOSIS = POOR, weeks-to-months even with ongoing dialysis, transfusions, and artificial nutrients  - NOT YET hospice-eligible = dialysis still ongoing      CODE STATUS = DNAR DNI    ADVANCE CARE PLANNING =     PALLIATIVE CARE TEAM INTERVENTIONS =   -- Advance care planning (especially about dialysis cessation & end of life)    - Will consult case management to explore potential VISA Expedition assistance for grand parents to visit from [place].  Family hope grandparents can see patient one last time before she dies:   #1 grandfather: Nelson Fleming,  1957, passport number [id number]    #2 grandmother: Bhakti Lundy,  1959, [id number]    - Will reconsult GI to discuss PEG again, as both patient and her mother are both adamant about exploring it, even knowing the anesthesia risk and risk of poor wound healing.  Patient is staunchly DNR DNI now, so there are already firm care limits even if patient does not thrive post procedure.  This risk appears worth exploring in my professional opinion.  And a definitive outcome either direction will help declare pt and bring more clarity(or closure) to this case already mired in many sub-optimal nutritional interventions (TPN, D5S, etc).    - Ongoing goals of care clarification dialogue, with patient ascent & seeking parental consent.... Longevity is still the overarching goal right now, but patient's mother is showing increased openess to consider HD & hospitalization cessation, with  "the condition of grandparents being able to visit patient 1 last time ([place] family members already visited last week). ==> will consult case management and explore if we can assist with grandparents' VISA expedition.    - Appreciate nephrology input & extra dialysis.  Patient appeared somewhat more alert today even post dialysis, favoring the decision to stop Lyrica.  No strong evidence of distress to justify resumption yet.      Summary:   I met patient, her mother Chikis bedside for follow up.  Patient was tired from dialysis, but visibly more alert, thanks to the extra dialysis & lyrica clearance.    We resumed our discussion about her nutritional state.... Pt remains adamantly against NGT.  She and Chikis both understood very well the temporary nature of TPN and its insufficiencies.... We discussed our prior unsuccessful attempts to get a PEG, due to her high anesthesia risk and poor wound healing, etc.  Patient and mom were both adamant to me that they were willing to brave those potential harms for a chance to achieve better intake & nutrition.  Informed them there is no guarantee if a PEG will ever be possible in this situation, and they both acknowledged and asked for another meeting with GI.    Very difficult discussion from this point on...  We reviewed the repetitive nature of many unsuccessful interventions last 2 months.... family were all saddened and grieving in their own ways.    I asked patient what would be a good life to her.  She was getting tired and took a long paused before saying:   No nasal tube  No dialysis....    She was saddened when reminded dialysis is needed for her survival..... Pt looked at Chikis and said \"We talked about THE TRUTH.... that I'm dying... you should just let me go.\"  Shortly after this outburst, patient stopped talking with us and went back to sleep.    Chikis was of course heart broken, and she and I continued our discussion outside patient's room.... " "Patient ascended, and now Chikis fulfilled her promise to patient, to \"talk to doctors about life & death things.\"  Chikis still wanted a PEG if possible, to find out if patient will improve if her nutrition is definitively better.    That being said, Chikis is also acknowledging the repetitive, non-successful nature of recent interventions.  I asked Chikis on her thoughts about stopping dialysis and hospitalization and going home to live out life on hospice, as I was concerned patient was no longer living a life she would have wanted.    \"I don't want Lily to die.... but maybe it is really too much.... all these years... all these trips in and out of hospitals....and her being poked and jabbed every day.... maybe it's really getting close to time to stop all this,\" Chikis said.    Chikis confirmed all patient's [place] relative have had a chance to visit last week.  Chikis wanted to explore if we could help expedite patient's grandparents' VISA to help them pay her 1 more visit from [place] (it did not feel right to Chikis if her parents don't get to see their grand daughter 1 last time when she has to make the decision to stop patient's dialysis & life sustaining care), as pt at one point was also strongly asking to see her grand parents (unable to confirm today as patient was sleeping and appeared emotionally exhausted already... will try confirming again later).  Chikis then provided me copies of their passports, complete with 's and passport numbers.    I cautioned Chikis on multiple points:  No guarantee at all if we could assist Visa approval fast enough in a time frame to affect patient's care.+  It seems unfair NOT to follow patient's stated desires, so that others may visit her, while she stays in the hospital and do most of the work just surviving...  Patient's survival may naturally end sooner irregardless of our interventions.      Ultimately, from our very long meeting " today, I drew these conclusions:  Pt and family's utmost current goal is still to explore PEG to see if patient can benefit.  Pt and family are willing to risk anesthesia and procedural/injury risks to try definitive nutrition.  Pt also remains DNAR DNI irregardless, hence with defined limits of care.  There is a growing understanding & acceptance (or resignation to) on BOTH patient and family's parts of her impending end-of-life.  Mother provided grandparents names, 's, and passport numbers.  We will explore with case management about feasibility of VISA expedition, to allow grandparents to visit 1 last time prior to patient's death.  The lack of any guarantee was conveyed clearly to Chikis.             Advance care planning:  The patient and/or legal decision maker has provided voluntary consent to discuss advance care planning. We discussed code status.   DNAR DNI    Total time spent in ACP discussion 60 minutes, which is separate from the time spent completing the evaluation and management visit.     Thank you for allowing me the opportunity to participate in the care of Lily Nicole      I spent a total of 50 minutes reviewing medical records, direct face-to-face time with the patient and/or family, documentation and coordination of care. This is separate from the time spent on advance care planning, which is documented above.         DO Elvia MEI ([facility]) [facility]   [id number]  [id number]  P: [phone]  F: [phone]  C: [phone]

## 2023-07-12 NOTE — DIETARY
Nutrition Services Brief Update:    Problem: Nutritional:  Goal: Nutrition support tolerated and meeting greater than 85% of estimated needs  Outcome: met    TPN currently at 100% of goal, providing 1532 kcal and 1.5 g/kg protein.  Per refeeding, phos and K+ WNL, magnesium not taken since 7/10.  Pt refused both breakfast and lunch yesterday, last recorded oral intake was 7/5.  Pt and pt's mother wanting a PEG placed as possible, adamant against NGT, GI consult placed.    RD following

## 2023-07-12 NOTE — DISCHARGE PLANNING
"HTH/SCP TCN chart review completed. Collaborated with LEXIS Carroll. Current discharge considerations are for discharge to Home with Resumption of [facility] services and family support when medically cleared.  Patient followed by palliative.  Please see notes for details.  Per collaboration during rounds, possible port placement.   Per Hospital Medicine note on 7/10/23 at 7:18 PM, \"Agrees with palliative hospice planning, femoral line is not tunneled, will not be able to go home with TPN via this line unless tunneled. Discussed with nephro, extra HD tomorrow to dialyze lyrica due to somnolence\".  TCN will continue to follow and collaborate with discharge planning team as additional post acute needs arise. Thank you.     Completed:  Choice obtained: [facility], DME (AD if indicated at time of dc) -> anticipating resumption of HH services with [facility] at time of dc  [facility] referral sent 7/6  "

## 2023-07-12 NOTE — PROGRESS NOTES
Assumed care on patient post report received, alert, tearful and family at bedside, no distress noted, patient and family informed of plan of care, other questions and concerns answered, heart rhythm and rate verified, bed to lowest position, side rails x 2, and call light placed within reach to call for assistance, will continue to monitor.

## 2023-07-12 NOTE — PROGRESS NOTES
"Hospital Medicine Daily Progress Note    Date of Service  7/11/2023    Chief Complaint  Lily Nicole is a 27 y.o. female admitted 6/29/2023 with low blood sugars.    Hospital Course  Lily Nicole is a 27 y.o. female who presents to ED on 6/29/2023 for hypoglycemia and dizziness. She has an extensive medical history including SLE glomerulonephritis syndrome with ESRD on dialysis MWF (on list at [facility]?), possible mixed connective tissue disease, severe malnutrition, hypoglycemia with seizure, HTN, constipation, S.pneumoniae bacteremia and endocarditis with cardiac arrest 11/2022, E.coli bacteremia on 6/20/2023, pulmonary HTN on 5LNC baseline. Her mother Chikis is her medical decision maker.  She had recent admission to [facility] on 6/14 for acute blood loss anemia from bleeding fistula and had complicated course with patient ultimately having NG tube placed for severe malnutrition. Dr. Fortune with psychiatry did advise during that admission that patient was incapacitated to make medical or placement decisions. Palliative care did follow along and hospice was discussed as an option for discharge, however family opted to have patient DC home on 6/28 with NG tube in place for tube feedings.   Apparently her feeding tube supplies were not delivered to her home and she now returns to the ED with glucose 55, diffuse pain, and weakness. In ED she is afebrile, on 7L O2 up from baseline 5L, reports \"pain everywhere\". She does state she does not want PEG tube. In ED she was treated with morphine for pain and boluses of 50% dextrose. She was admitted to hospitalist service for nutritional support while awaiting outpatient supplies to be delivered.  She was initially managed in the IMCU for hypoglycemia as well as severe hyponatremia but has since been transition to the telemetry floor  Patient seen at beside. Cont. To be somnolent, discontinue lyrica and IV dilaudid.   She continues to have intermittent " hypoglycemia, continue D10 and TPN, BG continues to be intermittently low despite TPN and D10   - cont. To hold plaquinil due to rare side effect of low BG, did speak with pt rheumatologist, no need to substitute alternative at this time. Agrees with palliative hospice planning.   - femoral line is not tunneled, will not be able to go home with TPN via this line unless tunneled.   - discussed with nephro, extra HD tomorrow to dialyze lyrica due to somnolence   Interval Problem Update  7/11. Reviewed chart and labs extensively  I had a long talk with patient, patient;s mother and Dr. Leavitt  After discussing hospice and goals of care patient and mother will consider PEG tube as initially patient alone was hesitant of this.  I mentioned TPN is not curative anc carries high morbidity and mortality risk whther by port or by tunneled PICC. Patient and mother agrees.   AT this time patient wants dialysis. WIll await their decision regarding PEG tube  Discussed with Staff. C can take care of PEG tube.    There is a possibility of deterioration of this patient's condition required the highest level of my preparedness for sudden, emergent intervention. Medical decision making is therefore complex. I provided  services, which included ordering labs and/or imaging, and discussing the case with various consultants.medication orders, frequent reevaluations of the patient's condition and response to treatment. Time was also devoted to counseling and coordinating care including review of records, pertinent lab data and studies, as well as discussing diagnostic evaluation and work up, planned therapeutic interventions and future disposition of care. Where indicated, the assessment and plan reflect discussion of patient with consultants, other healthcare providers, family members, and additional research needed to obtain further information in formulating the plan of care for Lily Nicole. Total time spent was 78 minutes.   In  addition to that I spent another 15 minutes for advanced care/counseling discussing and confirming code status and goals of care with patient, family, consultants and Palliative team.      -Continue with NS D10, will wean as her blood sugars stabilize, continue to encourage oral intake      I have discussed this patient's plan of care and discharge plan at IDT rounds today with Case Management, Nursing, Nursing leadership, and other members of the IDT team.    Consultants/Specialty  vascular surgery  GI  nephro  Code Status  DNAR/DNI    Disposition  The patient is not medically cleared for discharge to home or a post-acute facility.      I have placed the appropriate orders for post-discharge needs.    Review of Systems  Review of Systems   Constitutional:  Positive for malaise/fatigue and weight loss. Negative for chills and fever.   HENT: Negative.     Eyes: Negative.    Respiratory: Negative.  Negative for cough.    Cardiovascular: Negative.  Negative for chest pain and palpitations.   Gastrointestinal:  Positive for abdominal pain and nausea. Negative for heartburn and vomiting.   Genitourinary: Negative.  Negative for dysuria and frequency.   Musculoskeletal:  Positive for joint pain and myalgias. Negative for back pain and neck pain.   Skin: Negative.  Negative for itching and rash.   Neurological:  Positive for weakness. Negative for dizziness, focal weakness and headaches.   Endo/Heme/Allergies: Negative.  Negative for polydipsia. Does not bruise/bleed easily.   Psychiatric/Behavioral:  Negative for depression. The patient is nervous/anxious.         Physical Exam  Temp:  [36.7 °C (98.1 °F)-38.1 °C (100.6 °F)] 37.5 °C (99.5 °F)  Pulse:  [] 93  Resp:  [16-18] 16  BP: ()/(43-75) 95/63  SpO2:  [93 %-100 %] 93 %    Physical Exam  Vitals and nursing note reviewed.   Constitutional:       Appearance: She is ill-appearing and toxic-appearing.      Comments: Severely cachectic, somnolent  Thin   HENT:       Head:      Comments: Temporal wasting     Mouth/Throat:      Mouth: Mucous membranes are dry.   Cardiovascular:      Rate and Rhythm: Tachycardia present.   Pulmonary:      Breath sounds: Rhonchi present.      Comments: Nasal cannula oxygen  Musculoskeletal:      Comments: Very low muscle mass  + fistula right upper extremity  Hypothenar wasting   Skin:     Coloration: Skin is pale.   Neurological:      General: No focal deficit present.      Motor: Weakness present.   Psychiatric:      Comments: somnolent         Fluids    Intake/Output Summary (Last 24 hours) at 7/11/2023 1912  Last data filed at 7/11/2023 1400  Gross per 24 hour   Intake 700 ml   Output 900 ml   Net -200 ml           Laboratory  Recent Labs     07/09/23  0456 07/11/23  0400   WBC 4.2* 4.5*   RBC 3.50* 3.01*   HEMOGLOBIN 9.2* 8.1*   HEMATOCRIT 29.7* 26.5*   MCV 84.9 88.0   MCH 26.3* 26.9*   MCHC 31.0* 30.6*   RDW 66.3* 65.4*   PLATELETCT 88* 64*       Recent Labs     07/10/23  0920 07/11/23  0400 07/11/23  1506   SODIUM 130* 128* 130*   POTASSIUM 4.0 4.3 4.0   CHLORIDE 93* 92* 93*   CO2 28 27 29   GLUCOSE 82 98 198*   BUN 49* 38* 24*   CREATININE 3.52* 2.62* 1.75*   CALCIUM 7.5* 7.3* 7.2*               Recent Labs     07/10/23  0920   TRIGLYCERIDE 46         Imaging  DX-ABDOMEN FOR TUBE PLACEMENT   Final Result      Feeding tube tip at the descending duodenum.      IR-EXTREMITY ANGIOGRAM-UNILATERAL RIGHT    (Results Pending)          Assessment/Plan  * Hypoglycemia, FTT/severe malnutrition, FELIZ/ESRD on dialysis- (present on admission)  Assessment & Plan  -brittle hypoglycemia, likely from poor oral intake and inadequate liver gluconeogenesis .   She does not want to go back to NGT feeding.  She refused PEG placement   Wean off drip as much as possible,   Persistent hypoglycemic events with titration off D10, down to 28 overnight   IVF increased from 30 cc -> 50 cc >83 cc over last 24 hours   Continue to encourage oral intake  Continue Accu-Cheks  "and hypoglycemic protocol  Elevated C-peptide indicates significant amount of insulin secretion  Continues to have episodes of hypoglycemia requiring hypoglycemic protocol continues to be on D10  Continue blood sugar checks   Patient started on TPN  7/7/2023  Continues to have hypoglycemia despite D10 and TPN\"    Bloiod sugars improving   Address issues below    DNR (do not resuscitate)- (present on admission)  Assessment & Plan  Per Ms. Martín Nicole's wishes    Advanced care planning/counseling discussion  Assessment & Plan  Discussed with Dr. Leavitt  I explained that prolonged TPN and declining PEG would mean hospice may be appropriate and no more dialyiss  They understood and are now considering PEG tube and hopefully [facility]  I spent 15 minutes.    Chronic heart failure with reduced ejection fraction (HCC)- (present on admission)  Assessment & Plan  -LVEF 45% on echo 6/18/23  -Compensated, does not appear to be in fluid overload.  -Continue Coreg, Cozaar.    Subclinical hypothyroidism- (present on admission)  Assessment & Plan  -TSH:9.130  -Monitor outpatient    Pulmonary hypertension (HCC)- (present on admission)  Assessment & Plan  -5L O2 baseline. Due to MCTD and and lupus.    Systemic lupus erythematosus with organ system involvement (HCC)- (present on admission)  Assessment & Plan  -Continue chronic medications.  Stop plaquinil as it can cause hypoglycemia, no need to substitute per my conversation with pt outpatient rheumatologist     Follows up with outpatient rheumatologist.      Hyponatremia- (present on admission)  Assessment & Plan  Nephrology consulting  Ongoing correction, hemodialysis  This is stable, improved from admission\"    Ordered repeat labs    Severe protein-calorie malnutrition (HCC)- (present on admission)  Assessment & Plan  This is a clinical diagnosis present upon admission.   She previously had been on enteral feeding by NG tube.  Does not want further NG tube feeding.  GI consulted for " "consideration of PEG placement once she is medically optimized, she declines for now we will need to reconsult if patient does decide for PEG tube  Monitor current oral intake, continue to encourage   Did have discussion with patient and family regarding my concern for her ongoing malnutrition, after discussion pt is agreeable to TPN  Continue TPN via femoral central access, (started 7/7)\"    Patient deciding on PEG tube  Per Palliative not ready for hospice    Anemia of chronic disease, with acute blood loss anemia- (present on admission)  Assessment & Plan  - Related to ESRD, autoimmune disorders, with component of acute blood loss anemia from bleeding AVF.  -Hemoglobin 6.4 1 unit of PRBC given 7/7/2023  - Continue to monitor.  Restrictive transfusion strategy.  Repeat CBC in a.m.\"    Recent Labs     07/09/23  0456 07/11/23  0400   HEMOGLOBIN 9.2* 8.1*   HEMATOCRIT 29.7* 26.5*   MCV 84.9 88.0   MCH 26.3* 26.9*   PLATELETCT 88* 64*     Ordered iron panel if not done    Immunosuppression (HCC)- (present on admission)  Assessment & Plan  -Due to immunosuppresive medication for lupus    Chronic respiratory failure with hypoxia (HCC)- (present on admission)  Assessment & Plan  -Due to history of pulmonary hypertension.  At baseline 5 L oxygen.    -Continue respiratory support, RT protocol, supplemental oxygen.    Thrombocytopenia (HCC)- (present on admission)  Assessment & Plan  Does have downtrending of platelets over the last couple days  - improving, Plt ~120  Patient is not on any anticoagulants, likely from malnutrition as well as medications for lupus  Continue to monitor closely   Monitor CBC    ESRD (end stage renal disease) (HCC)- (present on admission)  Assessment & Plan  -On HD M,W,F  -Nephrology following.\"    Patient would still want dialysis    Arteriovenous fistula, acquired (HCC)- (present on admission)  Assessment & Plan  - Had bleeding after a scab popped off.  Now s/p fistulogram, central venogram " and venoplasty of central venous stenosis.  -Monitor for any bleeding.            VTE prophylaxis: SCDs/TEDs    I have performed a physical exam and reviewed and updated ROS and Plan today (7/11/2023). In review of yesterday's note (7/10/2023), there are no changes except as documented above.    Greater than 51 minutes spent prepping to see patient (e.g. review of tests) obtaining and/or reviewing separately obtained history. Performing a medically appropriate examination and/ evaluation.  Counseling and educating the patient/family/caregiver.  Ordering medications, tests, or procedures.  Referring and communicating with other health care professionals.  Documenting clinical information in EPIC.  Independently interpreting results and communicating results to patient/family/caregiver.  Care coordination.

## 2023-07-12 NOTE — CARE PLAN
The patient is Watcher - Medium risk of patient condition declining or worsening    Shift Goals  Clinical Goals: MANAGE PAIN  Patient Goals: REST AND CONTROL PAIN  Family Goals: rest    Progress made toward(s) clinical / shift goals:      Problem: Knowledge Deficit - Standard  Goal: Patient and family/care givers will demonstrate understanding of plan of care, disease process/condition, diagnostic tests and medications  Description: Target End Date:  1-3 days or as soon as patient condition allows    Document in Patient Education    1.  Patient and family/caregiver oriented to unit, equipment, visitation policy and means for communicating concern  2.  Complete/review Learning Assessment  3.  Assess knowledge level of disease process/condition, treatment plan, diagnostic tests and medications  4.  Explain disease process/condition, treatment plan, diagnostic tests and medications  Outcome: Not Progressing       Patient is not progressing towards the following goals:      Problem: Knowledge Deficit - Standard  Goal: Patient and family/care givers will demonstrate understanding of plan of care, disease process/condition, diagnostic tests and medications  Description: Target End Date:  1-3 days or as soon as patient condition allows    Document in Patient Education    1.  Patient and family/caregiver oriented to unit, equipment, visitation policy and means for communicating concern  2.  Complete/review Learning Assessment  3.  Assess knowledge level of disease process/condition, treatment plan, diagnostic tests and medications  4.  Explain disease process/condition, treatment plan, diagnostic tests and medications  Outcome: Not Progressing     Problem: Fall Risk  Goal: Patient will remain free from falls  Description: Target End Date:  Prior to discharge or change in level of care    Document interventions on the [place] [facility] Fall Risk Assessment    1.  Assess for fall risk factors  2.  Implement fall  precautions  Outcome: Not Progressing     Problem: Pain - Standard  Goal: Alleviation of pain or a reduction in pain to the patient’s comfort goal  Description: Target End Date:  Prior to discharge or change in level of care    Document on Vitals flowsheet    1.  Document pain using the appropriate pain scale per order or unit policy  2.  Educate and implement non-pharmacologic comfort measures (i.e. relaxation, distraction, massage, cold/heat therapy, etc.)  3.  Pain management medications as ordered  4.  Reassess pain after pain med administration per policy  5.  If opiods administered assess patient's response to pain medication is appropriate per POSS sedation scale  6.  Follow pain management plan developed in collaboration with patient and interdisciplinary team (including palliative care or pain specialists if applicable)  Outcome: Not Progressing     Problem: Skin Integrity  Goal: Skin integrity is maintained or improved  Description: Target End Date:  Prior to discharge or change in level of care    Document interventions on Skin Risk/Ross flowsheet groups and corresponding LDA    1.  Assess and monitor skin integrity, appearance and/or temperature  2.  Assess risk factors for impaired skin integrity and/or pressures ulcers  3.  Implement precautions to protect skin integrity in collaboration with interdisciplinary team  4.  Implement pressure ulcer prevention protocol if at risk for skin breakdown  5.  Confirm wound care consult if at risk for skin breakdown  6.  Ensure patient use of pressure relieving devices  (Low air loss bed, waffle overlay, heel protectors, ROHO cushion, etc)  Outcome: Not Progressing     Problem: Nutrition  Goal: Patient's nutritional and fluid intake will be adequate or improve  Description: Target End Date:  Prior to discharge or change in level of care    Document on I/O flowsheet    1.  Monitor nutritional intake  2.  Monitor weight per provider order  3.  Assess patient's  ability to take oral nutrition  4.  Collaborate with Speech Therapy, Dietitian and interdisciplinary team for appropriate feeding and fluid intake  5.  Assist with feeding  Outcome: Not Progressing  Goal: Enteral nutrition will be maintained or improve  Description: Target End Date:  Prior to discharge or change in level of care    1. Enteral access to be obtained or modified  2. Advance to goal rate per protocol  3. Collaborate with Clinical Dietitian for signs/symptoms of intolerance  4. Weights per provider order  5. Consider Speech Therapy consult for swallowing difficulties  Outcome: Not Progressing     Problem: Gastrointestinal Irritability  Goal: Nausea and vomiting will be absent or improve  Description: Target End Date:  Prior to discharge or change in level of care    Document on I/O and Assessment flowsheets    1.  Assess for history, duration, frequency, severity, and potential precipitating factors  2.  Administer antiemetics as ordered  3.  Emesis basin within easy reach of the patient, but out of sight if psychogenic component  4.  Eliminate strong odors from surroundings  5.  Introduce cold water, ice chips, marj products, and room temperature broth or bouillon if tolerated and appropriate to the patient's diet  6.  Encourage small amounts of bland, simple foods like broth, rice, bananas, Jell-O, crackers or toast if tolerated and appropriate to patient's diet  7.  Position the patient upright while eating and for 1 to 2 hours post-meal  8.  Encourage nonpharmacological nausea control techniques such as relaxation, guided imagery, music therapy, distraction, or deep breathing exercises  Outcome: Not Progressing

## 2023-07-13 NOTE — PROGRESS NOTES
"Hospital Medicine Daily Progress Note    Date of Service  7/12/2023    Chief Complaint  Lily Nicole is a 27 y.o. female admitted 6/29/2023 with low blood sugars.    Hospital Course  Lily Nicole is a 27 y.o. female who presents to ED on 6/29/2023 for hypoglycemia and dizziness. She has an extensive medical history including SLE glomerulonephritis syndrome with ESRD on dialysis MWF (on list at [facility]?), possible mixed connective tissue disease, severe malnutrition, hypoglycemia with seizure, HTN, constipation, S.pneumoniae bacteremia and endocarditis with cardiac arrest 11/2022, E.coli bacteremia on 6/20/2023, pulmonary HTN on 5LNC baseline. Her mother Chikis is her medical decision maker.  She had recent admission to [facility] on 6/14 for acute blood loss anemia from bleeding fistula and had complicated course with patient ultimately having NG tube placed for severe malnutrition. Dr. Fortune with psychiatry did advise during that admission that patient was incapacitated to make medical or placement decisions. Palliative care did follow along and hospice was discussed as an option for discharge, however family opted to have patient DC home on 6/28 with NG tube in place for tube feedings.   Apparently her feeding tube supplies were not delivered to her home and she now returns to the ED with glucose 55, diffuse pain, and weakness. In ED she is afebrile, on 7L O2 up from baseline 5L, reports \"pain everywhere\". She does state she does not want PEG tube. In ED she was treated with morphine for pain and boluses of 50% dextrose. She was admitted to hospitalist service for nutritional support while awaiting outpatient supplies to be delivered.  She was initially managed in the IMCU for hypoglycemia as well as severe hyponatremia but has since been transition to the telemetry floor  Patient seen at beside. Cont. To be somnolent, discontinue lyrica and IV dilaudid.   She continues to have intermittent " hypoglycemia, continue D10 and TPN, BG continues to be intermittently low despite TPN and D10   - cont. To hold plaquinil due to rare side effect of low BG, did speak with pt rheumatologist, no need to substitute alternative at this time. Agrees with palliative hospice planning.   - femoral line is not tunneled, will not be able to go home with TPN via this line unless tunneled.   - discussed with nephro, extra HD tomorrow to dialyze lyrica due to somnolence   Interval Problem Update  7/11. Reviewed chart and labs extensively  I had a long talk with patient, patient;s mother and Dr. Leavitt  After discussing hospice and goals of care patient and mother will consider PEG tube as initially patient alone was hesitant of this.  I mentioned TPN is not curative anc carries high morbidity and mortality risk whther by port or by tunneled PICC. Patient and mother agrees.   AT this time patient wants dialysis. WIll await their decision regarding PEG tube  Discussed with Staff. HHC can take care of PEG tube.  7/12. Spoke with sisters.  Spoke with Palliative.  Patient no comment if I ask by herself, Awaiting mothers response. Enumerated choices:  Explained PEG tube and HHC choice, if they say yes will speak with GI  Explained port + TPN is a choice but is temporary. Cannot do PICC  Explained Hospice    There is a possibility of deterioration of this patient's condition required the highest level of my preparedness for sudden, emergent intervention. Medical decision making is therefore complex. I provided  services, which included ordering labs and/or imaging, and discussing the case with various consultants.medication orders, frequent reevaluations of the patient's condition and response to treatment. Time was also devoted to counseling and coordinating care including review of records, pertinent lab data and studies, as well as discussing diagnostic evaluation and work up, planned therapeutic interventions and future disposition of  care. Where indicated, the assessment and plan reflect discussion of patient with consultants, other healthcare providers, family members, and additional research needed to obtain further information in formulating the plan of care for Lily Nicole. Total time spent was 52 minutes.       I have discussed this patient's plan of care and discharge plan at IDT rounds today with Case Management, Nursing, Nursing leadership, and other members of the IDT team.    Consultants/Specialty  vascular surgery  GI  nephro  Code Status  DNAR/DNI    Disposition  The patient is not medically cleared for discharge to home or a post-acute facility.      I have placed the appropriate orders for post-discharge needs.    Review of Systems  Review of Systems   Constitutional:  Positive for malaise/fatigue and weight loss. Negative for chills and fever.   HENT: Negative.     Eyes: Negative.    Respiratory: Negative.  Negative for cough.    Cardiovascular: Negative.  Negative for chest pain and palpitations.   Gastrointestinal:  Positive for abdominal pain and nausea. Negative for heartburn and vomiting.   Genitourinary: Negative.  Negative for dysuria and frequency.   Musculoskeletal:  Positive for joint pain and myalgias. Negative for back pain and neck pain.   Skin: Negative.  Negative for itching and rash.   Neurological:  Positive for weakness. Negative for dizziness, focal weakness and headaches.   Endo/Heme/Allergies: Negative.  Negative for polydipsia. Does not bruise/bleed easily.   Psychiatric/Behavioral:  Negative for depression. The patient is nervous/anxious.         Physical Exam  Temp:  [36.3 °C (97.3 °F)-38.3 °C (100.9 °F)] 37.5 °C (99.5 °F)  Pulse:  [112-127] 112  Resp:  [15-16] 16  BP: ()/(53-67) 80/53  SpO2:  [90 %-100 %] 91 %    Physical Exam  Vitals and nursing note reviewed.   Constitutional:       Appearance: She is ill-appearing and toxic-appearing.      Comments: Severely cachectic, somnolent  Thin    HENT:      Head:      Comments: Temporal wasting     Mouth/Throat:      Mouth: Mucous membranes are dry.   Cardiovascular:      Rate and Rhythm: Tachycardia present.   Pulmonary:      Breath sounds: Rhonchi present.      Comments: Nasal cannula oxygen  Musculoskeletal:      Comments: Very low muscle mass  + fistula right upper extremity  Hypothenar wasting   Skin:     Coloration: Skin is pale.   Neurological:      General: No focal deficit present.      Motor: Weakness (unchanged) present.   Psychiatric:      Comments: somnolent         Fluids    Intake/Output Summary (Last 24 hours) at 7/12/2023 1838  Last data filed at 7/12/2023 0700  Gross per 24 hour   Intake --   Output 0 ml   Net 0 ml           Laboratory  Recent Labs     07/11/23  0400 07/12/23  0300   WBC 4.5* 4.1*   RBC 3.01* 2.87*   HEMOGLOBIN 8.1* 7.5*   HEMATOCRIT 26.5* 25.2*   MCV 88.0 87.8   MCH 26.9* 26.1*   MCHC 30.6* 29.8*   RDW 65.4* 64.4*   PLATELETCT 64* 58*       Recent Labs     07/11/23  0400 07/11/23  1506 07/12/23  0300   SODIUM 128* 130* 133*   POTASSIUM 4.3 4.0 4.2   CHLORIDE 92* 93* 97   CO2 27 29 28   GLUCOSE 98 198* 98   BUN 38* 24* 35*   CREATININE 2.62* 1.75* 2.35*   CALCIUM 7.3* 7.2* 7.3*               Recent Labs     07/10/23  0920   TRIGLYCERIDE 46         Imaging  DX-ABDOMEN FOR TUBE PLACEMENT   Final Result      Feeding tube tip at the descending duodenum.      IR-EXTREMITY ANGIOGRAM-UNILATERAL RIGHT    (Results Pending)          Assessment/Plan  * Hypoglycemia, FTT/severe malnutrition, FELIZ/ESRD on dialysis- (present on admission)  Assessment & Plan  -brittle hypoglycemia, likely from poor oral intake and inadequate liver gluconeogenesis .   She does not want to go back to NGT feeding.  She refused PEG placement   Wean off drip as much as possible,   Persistent hypoglycemic events with titration off D10, down to 28 overnight   IVF increased from 30 cc -> 50 cc >83 cc over last 24 hours   Continue to encourage oral  "intake  Continue Accu-Cheks and hypoglycemic protocol  Elevated C-peptide indicates significant amount of insulin secretion  Continues to have episodes of hypoglycemia requiring hypoglycemic protocol continues to be on D10  Continue blood sugar checks   Patient started on TPN  7/7/2023  Continues to have hypoglycemia despite D10 and TPN\"    Blood sugars improving   Address issues below    DNR (do not resuscitate)- (present on admission)  Assessment & Plan  Per Ms. Martín Nicole's wishes    Advanced care planning/counseling discussion  Assessment & Plan  Discussed with Dr. Leavitt  I explained that prolonged TPN and declining PEG would mean hospice may be appropriate and no more dialyiss  They understood and are now considering PEG tube and hopefully [facility]  Mother notified for decision regarding PEG. Also notified staff. If all is a go, will consult GI in the AM    Chronic heart failure with reduced ejection fraction (HCC)- (present on admission)  Assessment & Plan  -LVEF 45% on echo 6/18/23  -Compensated, does not appear to be in fluid overload.  -Continue Coreg, Cozaar.    Subclinical hypothyroidism- (present on admission)  Assessment & Plan  -TSH:9.130  -Monitor outpatient    Pulmonary hypertension (HCC)- (present on admission)  Assessment & Plan  -5L O2 baseline. Due to MCTD and and lupus.    Systemic lupus erythematosus with organ system involvement (HCC)- (present on admission)  Assessment & Plan  -Continue chronic medications.  Stop plaquinil as it can cause hypoglycemia, no need to substitute per my conversation with pt outpatient rheumatologist     Follows up with outpatient rheumatologist.      Hyponatremia- (present on admission)  Assessment & Plan  Nephrology consulting  Ongoing correction, hemodialysis  This is stable, improved from admission\"    Ordered repeat labs    Severe protein-calorie malnutrition (HCC)- (present on admission)  Assessment & Plan  This is a clinical diagnosis present upon admission.   She " "previously had been on enteral feeding by NG tube.  Does not want further NG tube feeding.  GI consulted for consideration of PEG placement once she is medically optimized, she declines for now we will need to reconsult if patient does decide for PEG tube  Monitor current oral intake, continue to encourage   Did have discussion with patient and family regarding my concern for her ongoing malnutrition, after discussion pt is agreeable to TPN  Continue TPN via femoral central access, (started 7/7)\"    Patient deciding on PEG tube  Per Palliative not ready for hospice    Anemia of chronic disease, with acute blood loss anemia- (present on admission)  Assessment & Plan  - Related to ESRD, autoimmune disorders, with component of acute blood loss anemia from bleeding AVF.  -Hemoglobin 6.4 1 unit of PRBC given 7/7/2023  - Continue to monitor.  Restrictive transfusion strategy.  Repeat CBC in a.m.\"    Recent Labs     07/11/23  0400 07/12/23  0300   HEMOGLOBIN 8.1* 7.5*   HEMATOCRIT 26.5* 25.2*   MCV 88.0 87.8   MCH 26.9* 26.1*   PLATELETCT 64* 58*   Dropping Hb monitor for bleeding  Ordered iron panel if not done    Immunosuppression (HCC)- (present on admission)  Assessment & Plan  -Due to immunosuppresive medication for lupus    Chronic respiratory failure with hypoxia (HCC)- (present on admission)  Assessment & Plan  -Due to history of pulmonary hypertension.  At baseline 5 L oxygen.    -Continue respiratory support, RT protocol, supplemental oxygen.    Thrombocytopenia (HCC)- (present on admission)  Assessment & Plan  Does have downtrending of platelets over the last couple days  - improving, Plt ~120  Patient is not on any anticoagulants, likely from malnutrition as well as medications for lupus  Continue to monitor closely   Monitor CBC    ESRD (end stage renal disease) (HCC)- (present on admission)  Assessment & Plan  -On HD M,W,F  -Nephrology following.\"    Patient would still want dialysis    Arteriovenous fistula, " acquired (HCC)- (present on admission)  Assessment & Plan  - Had bleeding after a scab popped off.  Now s/p fistulogram, central venogram and venoplasty of central venous stenosis.  -Monitor for any bleeding.            VTE prophylaxis: SCDs/TEDs    I have performed a physical exam and reviewed and updated ROS and Plan today (7/12/2023). In review of yesterday's note (7/11/2023), there are no changes except as documented above.

## 2023-07-13 NOTE — PROGRESS NOTES
Expiration paperwork printed and signed by RN, transport call placed for patient . Transport picked up patient with patient armband and patient in body bag. Transporter refused to sign expiration paperwork for  and transfer of care.

## 2023-07-13 NOTE — PROGRESS NOTES
Unable to check bp and pulse ox @ 0300, MARIA VICTORIA Mtz notified and in patient's room right away,patient with labored breathing, mother also arrived @ 0305, noted patient not breathing, per monitor asystole and provider pronounced patient  at 0310.

## 2023-07-13 NOTE — DISCHARGE PLANNING
HTH/[facility] TCN chart review completed. Collaborated with LEXIS Owusu.  Current discharge considerations are for hoem with resumption of [facility] and family support when medically cleared.    Per chart review note by Sierra rand on 7/12/23 at 10:47 AM, (Pt and pt's mother wanting a PEG placed as possible, adamant against NGT, GI consult placed).   TCN will continue to follow and collaborate with discharge planning team as additional post acute needs arise. Thank you.    Completed:  Choice obtained: HH, DME (AD if indicated at time of dc) -> anticipating resumption of HH services with [facility] at time of dc  [facility] referral sent 7/6.    Addendum:  2013- Per chart review nephrology note by Dr. Metz on 7/12/23 at 1907, (Failure to thrive with severe protein calorie malnutrition.  Patient currently on TPN via left femoral temporary triple-lumen catheter.  The patient's mother is hoping that her nutrition status can be improved enough with TPN that but she becomes a candidate for EGD guided PEG tube or IR guided G-tube placement. Continue TPN for now).

## 2023-07-13 NOTE — PROGRESS NOTES
Unable to read pulse ox, sensors changed, oxy mask applied at 10L, spo2 @ 92%, will continue to monitor.

## 2023-07-13 NOTE — PROGRESS NOTES
Report received per day RN and assumed care, patient awake and in tears d/t 101/10 generalized pain, hospice physician present and dilaudid IV x 1 ordered (see MAR), family at bedside, blood glucose rechecked (see result), unable to draw blood per fem TLC-will notify provider, plan of care discussed with mom and sister and they verbalized understanding, heart rate and rhythm verified, safety precautions maintained, bed to lowest position, side rails up x 3, alarm on and call light placed within reach to call for assistance.

## 2023-07-13 NOTE — DISCHARGE SUMMARY
Death Summary    Cause of Death  Cardiopulmonary arrest due to multiorgan failure due to failure to thrive and malnutrition due to systemic lupus erythematosus.    Comorbid Conditions at the Time of Death  Principal Problem:    Hypoglycemia, FTT/severe malnutrition, FELIZ/ESRD on dialysis (POA: Yes)  Active Problems:    Arteriovenous fistula, acquired (HCC) (POA: Yes)    ESRD (end stage renal disease) (HCC) (POA: Yes)    Chest pain (POA: Yes)    Thrombocytopenia (HCC) (POA: Yes)    Gastroesophageal reflux disease without esophagitis (POA: Yes)    Shortness of breath (POA: Yes)    Chronic respiratory failure with hypoxia (HCC) (POA: Yes)    Immunosuppression (HCC) (POA: Yes)    Under care of palliative care physician (POA: Yes)      Overview: Formatting of this note might be different from the original.      Initial consult: 6/3/15 for psych-soc/spiritual support, GOC and       introduction to PPCCS.       PPCCS [phone]    Anemia of chronic disease, with acute blood loss anemia (POA: Yes)    Severe protein-calorie malnutrition (HCC) (POA: Yes)    Hyponatremia (POA: Yes)    Systemic lupus erythematosus with organ system involvement (HCC) (POA: Yes)    Pulmonary hypertension (HCC) (POA: Yes)    Subclinical hypothyroidism (POA: Yes)    Chronic heart failure with reduced ejection fraction (HCC) (POA: Yes)    Advanced care planning/counseling discussion (POA: Unknown)    DNR (do not resuscitate) (POA: Yes)  Resolved Problems:    * No resolved hospital problems. *      History of Presenting Illness and Hospital Course  This is a 27 y.o. female admitted 6/29/2023 with hypoglycemia and malnutrition.  This 27-year-old female with extensive past medical history including SLE glomerulonephritis syndrome with end-stage renal disease requiring hemodialysis, mixed connective tissue disease, severe malnutrition, hypertension, pulmonary hypertension on chronic oxygen, chronic pain and failure to thrive.    Patient was admitted on  "6/23/2023 with complaints of low blood sugar.  Patient was discharged home with tube feeding following extended admission for acute blood loss anemia related to bleeding AV fistula.  However, her tube feeding supplies were delayed and delivery.  Thus, she presented to the emergency department with complaints of dizziness, hypoglycemia, weakness and generalized pain. On 7/1 patient underwent vascular procedure for right arm dialysis fistula bleeding patient requiring venoplasty. Her hospital stay was complicated by severe repeated hypoglycemic episodes and severe hyponatremia.    Has been declining NG tube placement for treatment of her malnutrition and continuous hypoglycemia.  Per GI, PEG tube placement carries high morbidity and mortality risk for procedure and given patient's severe malnutrition and cachexia would likely be unsuccessful.  Patient was therefore placed on TPN.  However, PICC line placement is not possible due to extremely poor vasculature.  Palliative care has been actively involved with patient and her mother Chikis.  On this admission with palliative care, patient was made a DNR/DNI status per her and her mother's wishes.  Hospice was being explored with palliative care physician.    This evening I was alerted by bedside RN that patient was having difficulty maintaining oxygen saturation and was requiring 10 L oxy mask up from her chronic 5 L.  She was intermittently hypotensive with systolic blood pressures in the 80s.    Patient became restless and agitated.  Due to these concerns, I was asked to present to bedside. I have asked the bedside RN to reach out to patient's mother for her to come to the hospital. Upon arrival to bedside, patient states that she is in generalized overall severe pain.  She has just received Zofran and Dilaudid.  Patient states that she is \"dying\" and \"ready\".  Patient's mother Chikis arrived at bedside.  I explained to her that patient has stated that she is " ready to pass.  I quickly discussed comfort measures with Chikis.  She opted to make Lily comfortable and allow her to pass peacefully.  Within several minutes of this decision, Lily became unresponsive and passed away.  I personally called time of death at 0310.  Emotional support provided to Chikis.  Chikis's friend arrived to hospital, I personally escorted her to room.    Death Date: 07/13/23   Death Time: 0310      Pronounced By (MD): RAYSA Adhikari [facility], NOC Hospitalist RAYSA

## 2023-07-13 NOTE — PROGRESS NOTES
Nephrology Daily Progress Note    Date of Service  7/12/2023    Chief Complaint  27 y.o. female with ESRD on dialysis Monday Wednesday Friday, severe systemic lupus erythematosus, likely scleroderma, severe protein calorie malnutrition requiring supplemental nutrition admitted 6/29/2023 with hypoglycemia and failure to thrive.    Interval Problem Update  7/12-patient remains slightly somnolent.  Denies shortness of breath, chest pain.    Review of Systems  Review of Systems   Constitutional:  Negative for fever.   Respiratory:  Negative for shortness of breath.    Cardiovascular:  Negative for chest pain.   Gastrointestinal:  Negative for abdominal pain.   Musculoskeletal:  Positive for back pain and joint pain.   All other systems reviewed and are negative.       Physical Exam  Temp:  [36.3 °C (97.3 °F)-38.3 °C (100.9 °F)] 37.5 °C (99.5 °F)  Pulse:  [112-127] 112  Resp:  [15-16] 16  BP: ()/(53-67) 80/53  SpO2:  [90 %-100 %] 91 %    Physical Exam  Constitutional:       General: She is not in acute distress.     Appearance: She is cachectic. She is ill-appearing.      Interventions: Nasal cannula in place.   HENT:      Head: Normocephalic and atraumatic.      Mouth/Throat:      Mouth: Mucous membranes are moist.      Pharynx: Oropharynx is clear. No oropharyngeal exudate.   Eyes:      General: No scleral icterus.     Extraocular Movements: Extraocular movements intact.   Neck:      Trachea: No tracheal deviation.   Cardiovascular:      Rate and Rhythm: Normal rate and regular rhythm.      Heart sounds: Murmur heard.   Pulmonary:      Effort: Pulmonary effort is normal.      Breath sounds: No stridor. No rales.   Abdominal:      Palpations: Abdomen is soft.      Tenderness: There is no abdominal tenderness.   Musculoskeletal:         General: Normal range of motion.      Cervical back: Normal range of motion. No rigidity.      Right lower leg: Edema (2+) present.      Left lower leg: Edema (2+) present.    Skin:     General: Skin is warm and dry.   Neurological:      General: No focal deficit present.      Mental Status: She is oriented to person, place, and time. She is lethargic.   Psychiatric:         Mood and Affect: Mood normal.         Behavior: Behavior normal.     Dialysis access: Right upper extremity AV fistula, with patent bruit and thrill.  Sutures still in place that was applied during ED visit 6/9/2023    Fluids    Intake/Output Summary (Last 24 hours) at 7/12/2023 1907  Last data filed at 7/12/2023 0700  Gross per 24 hour   Intake --   Output 0 ml   Net 0 ml       Laboratory  Labs reviewed, pertinent labs below.  Recent Labs     07/11/23  0400 07/12/23  0300   WBC 4.5* 4.1*   RBC 3.01* 2.87*   HEMOGLOBIN 8.1* 7.5*   HEMATOCRIT 26.5* 25.2*   MCV 88.0 87.8   MCH 26.9* 26.1*   MCHC 30.6* 29.8*   RDW 65.4* 64.4*   PLATELETCT 64* 58*     Recent Labs     07/11/23  0400 07/11/23  1506 07/12/23  0300   SODIUM 128* 130* 133*   POTASSIUM 4.3 4.0 4.2   CHLORIDE 92* 93* 97   CO2 27 29 28   GLUCOSE 98 198* 98   BUN 38* 24* 35*   CREATININE 2.62* 1.75* 2.35*   CALCIUM 7.3* 7.2* 7.3*               URINALYSIS:  Lab Results   Component Value Date/Time    COLORURINE Yellow 02/15/2021 1108    CLARITY Clear 02/15/2021 1108    SPECGRAVITY 1.015 02/15/2021 1108    PHURINE 8.5 (A) 02/15/2021 1108    KETONES Negative 02/15/2021 1108    PROTEINURIN 30 (A) 02/15/2021 1108    BILIRUBINUR Negative 02/15/2021 1108    UROBILU 0.2 07/16/2020 0900    NITRITE Negative 02/15/2021 1108    LEUKESTERAS Negative 02/15/2021 1108    OCCULTBLOOD Trace (A) 02/15/2021 1108     UPC  Lab Results   Component Value Date/Time    TOTPROTUR 45.0 (H) 07/16/2020 0900      Lab Results   Component Value Date/Time    CREATININEU 18.93 07/16/2020 0900         Imaging interpreted by radiologist. Imaging reports reviewed with pertinent findings below  DX-ABDOMEN FOR TUBE PLACEMENT   Final Result      Feeding tube tip at the descending duodenum.       IR-EXTREMITY ANGIOGRAM-UNILATERAL RIGHT    (Results Pending)         Current Facility-Administered Medications   Medication Dose Route Frequency Provider Last Rate Last Admin    epoetin roque (Epogen/Procrit) injection 10,000 Units  10,000 Units Intravenous MO, WE + FR Preethi Rudolph M.D.   10,000 Units at 07/10/23 1147    epoetin (Retacrit) injection (Dialysis Use Only) 3,000 Units  3,000 Units Intravenous MO, WE + FR Preethi Rudolph M.D.   3,000 Units at 07/10/23 1147    TPN Adult Central Line   Intravenous TPN DAILY Preethi Rudolph M.D. 55 mL/hr at 07/11/23 1956 New Bag at 07/11/23 1956    HYDROmorphone (Dilaudid) tablet 2 mg  2 mg Oral Q6HRS PRN Preethi Rudolph M.D.   2 mg at 07/12/23 1053    mineral oil-pet hydrophilic (Aquaphor) ointment   Topical TID PRN GETACHEW Mac.P.R.N.        acyclovir (Zovirax) capsule 200 mg  200 mg Oral BID Preethi Rudolph M.D.   200 mg at 07/12/23 1805    dextrose 50% (D50W) injection 50 mL  50 mL Intravenous PRN GETACHEW Mac.P.R.N.   50 mL at 07/09/23 2253    MD Alert...TPN per Pharmacy   Other PHARMACY TO DOSE Preethi Rudolph M.D.        sodium chloride 154 mEq in dextrose 10% 1,000 mL infusion   Intravenous PRN Preethi Rudolph M.D. 55 mL/hr at 07/09/23 1740 New Bag at 07/09/23 1740    losartan (Cozaar) tablet 25 mg  25 mg Oral DAILY Mary Mtz, A.P.R.N.   25 mg at 07/09/23 0842    senna-docusate (Pericolace Or Senokot S) 8.6-50 MG per tablet 2 Tablet  2 Tablet Oral BID GETACHEW Marino.P.R.N.   2 Tablet at 07/10/23 1807    And    polyethylene glycol/lytes (Miralax) PACKET 1 Packet  1 Packet Oral QDAY PRN Mary Mtz, A.P.R.N.        And    magnesium hydroxide (Milk Of Magnesia) suspension 30 mL  30 mL Oral QDAY PRN Mary Mtz, A.P.R.N.        And    bisacodyl (Dulcolax) suppository 10 mg  10 mg Rectal QDAY PRN Mary Mtz, A.P.R.N.        OLANZapine (ZyPREXA) tablet 2.5 mg  2.5 mg Oral Q EVENING Mary Mtz,  A.P.R.N.   2.5 mg at 07/12/23 1805    mycophenolate (Cellcept) 200 MG/ML susp 500 mg  500 mg Oral QAM GETACHEW Marino.P.R.N.   500 mg at 07/09/23 0921    predniSONE (Deltasone) tablet 5 mg  5 mg Oral DAILY GETACHEW Marino.P.R.N.   5 mg at 07/11/23 1327    escitalopram (Lexapro) tablet 10 mg  10 mg Oral DAILY Mary Mtz A.P.R.N.   10 mg at 07/11/23 1328    carvedilol (Coreg) tablet 3.125 mg  3.125 mg Oral BID WITH MEALS GETACHEW Marino.P.R.N.   3.125 mg at 07/10/23 1807    omeprazole (PriLOSEC) capsule 20 mg  20 mg Oral DAILY GETACHEW Marino.P.R.N.   20 mg at 07/11/23 1326    promethazine (Phenergan) tablet 25 mg  25 mg Oral Q6HRS PRN Olu Riggs M.D.   25 mg at 07/01/23 1106    prochlorperazine (Compazine) injection 10 mg  10 mg Intravenous Q4HRS PRN Olu Riggs M.D.   10 mg at 07/01/23 1137    LORazepam (Ativan) injection 0.5 mg  0.5 mg Intravenous Q6HRS PRN Olu Riggs M.D.   0.5 mg at 07/12/23 1053    Or    LORazepam (Ativan) tablet 0.5 mg  0.5 mg Oral Q6HRS PRN Olu Riggs M.D.   0.5 mg at 07/10/23 1128    diphenhydrAMINE (Benadryl) injection 25 mg  25 mg Intravenous Q6HRS PRN GETACHEW Marino.P.R.N.   25 mg at 07/12/23 1053    cloNIDine (Catapres) 0.2 MG/24HR patch 1 Patch  1 Patch Transdermal Q7 DAYS Rivera Gibson D.O.   1 Patch at 07/06/23 1623    hydrALAZINE (Apresoline) injection 10 mg  10 mg Intravenous Q4HRS PRN ZULEIKA ShultzOJg   10 mg at 07/01/23 1722    ondansetron (Zofran) syringe/vial injection 4 mg  4 mg Intravenous Q4HRS PRN ERICA Kam   4 mg at 07/11/23 1900         Assessment/Plan  27 y.o. female with ESRD on dialysis Monday Wednesday Friday, severe systemic lupus erythematosus, likely scleroderma, severe protein calorie malnutrition requiring supplemental nutrition admitted 6/29/2023 with hypoglycemia and failure to thrive.    1.  ESRD on hemodialysis Monday Wednesday Friday.  Anuric.  Given the bleeding from her  fistula today, I will reschedule her dialysis to tomorrow.  Check renal function panel daily.    2.  Dialysis access: Right upper arm AV fistula.  I attempted removal of the suture from her right upper arm that was placed 6/9/2023.  However, with mild manipulation the fistula began bleeding again from the previous suture site, requiring holding of manual pressure for 15 minutes to stop the bleeding.  Patient's family was instructed where to hold pressure if bleeding resumes.  Recommend vascular surgery consultation for this suture removal.    3.  Anemia of chronic disease, uncontrolled.  Continue outpatient dose of Retacrit 13,000 units thrice weekly with dialysis.  Check CBC daily.    4.  Pancytopenia, likely due to uncontrolled lupus.  Check CBC daily.    5.  Failure to thrive with severe protein calorie malnutrition.  Patient currently on TPN via left femoral temporary triple-lumen catheter.  The patient's mother is hoping that her nutrition status can be improved enough with TPN that but she becomes a candidate for EGD guided PEG tube or IR guided G-tube placement.  Continue TPN for now.    6.  Hyponatremia, mild, persistent.  Limit hypotonic fluids.  Do not order salt tabs.  Check sodium level daily.    7.  Hypoglycemia, recommend checking glucose only from the left arm, which is her known fistula arm.    8.  History of hypertension, currently with hypotension.  Recommend hold losartan, stop/discontinue clonidine patch.  Hold carvedilol for systolic blood pressure less than 100.    9.  Goals of care, I had a discussion with the patient's mother, Chikis, and patient has not yet excepted to the possibility of end-of-life.  Continue current management.    Prognosis grim  Discussed with Dr. Brendon Mays  I spent greater than or equal to 50 minutes of care time for this patient, including direct face-to-face contact with the patient, coordinating care with the patient's primary team, adjusting medication, and  updating dialysis orders.        Navin Metz MD  Nephrology  [facility]

## 2023-07-14 NOTE — CARE PLAN
The patient is Stable - Low risk of patient condition declining or worsening    Shift Goals  Clinical Goals: Patient will receive dialysis, and tolerate blood transfusion  Patient Goals: Rest     Progress made toward(s) clinical / shift goals:  Patient tolerated dialysis and blood transfusion. No signs of transfusion reaction noted.   Problem: Pain - Standard  Goal: Alleviation of pain or a reduction in pain to the patient’s comfort goal  Outcome: Progressing     Problem: Knowledge Deficit - Standard  Goal: Patient and family/care givers will demonstrate understanding of plan of care, disease process/condition, diagnostic tests and medications  Outcome: Progressing       Patient is not progressing towards the following goals:      
The patient is Watcher - Medium risk of patient condition declining or worsening    Shift Goals  Clinical Goals: Pt will remain free from falls, pt will receive blood transfusion   Patient Goals: Rest     Progress made toward(s) clinical / shift goals:  Pt waiting for blood from lab to begin transfusion. Pt educated on POC. Fall precautions in place     Patient is not progressing towards the following goals:n/a      Problem: Pain - Standard  Goal: Alleviation of pain or a reduction in pain to the patient’s comfort goal  Outcome: Progressing     Problem: Knowledge Deficit - Standard  Goal: Patient and family/care givers will demonstrate understanding of plan of care, disease process/condition, diagnostic tests and medications  Outcome: Progressing         
yes

## 2024-02-08 NOTE — PROGRESS NOTES
"Physical Therapy Daily Treatment Note  [address]  [facility] [address]  P: [phone]  F: [phone]    Patient: Kamilla Hawthorne   : 1943  Referring practitioner: Sofiya Beltrán MD  Date of Initial Visit: Type: THERAPY  Noted: 2024  Today's Date: 2024  Patient seen for 3 sessions       Visit Diagnoses:    ICD-10-CM ICD-9-CM   1. Acute pain of left knee  M25.562 719.46   2. Personal history of fall  Z91.81 V15.88   3. Decreased functional mobility and endurance  Z74.09 780.99       Kamilla Hawthorne reports:     Subjective   Pt reports \"my calves are sore from exercising, but my knee has been doing good\".   Objective   See Exercise, Manual, and Modality Logs for complete treatment.   Added: SL hold into march to improve balance and hip abductor strength, leg press DL and SL (44 and 22 lbs)    R LE toe out during side stepping with ylw loop resistance v/c to correct    Progressed: five pound weight in hands sit<>stand transfer (raised)    Assessment/Plan  Kamilla demonstrated improved form during sit to stand transfer and was able to lean forward, increase her base of support, and hold a five pound weight without LOB on descending from standing. She reports that she has an injection in her left knee tomorrow. Pt continues to require SBA when ambulating in clinic and around turns. Continue POC.    Timed:         Manual Therapy:    0     mins  78394;     Therapeutic Exercise:    30     mins  03082;     Neuromuscular Shelly:    0    mins  85652;    Therapeutic Activity:     15     mins  79983;     Gait Trainin     mins  05533;     Ultrasound:     0     mins  47434;    Ionto                               0    mins   58470  Self Care                       0     mins   14052  Canalith Repos    0     mins 27281      Un-Timed:  Electrical Stimulation:    0     mins  48916 ( );  Dry Needling     0     mins self-pay  Traction     0     mins 75764      Timed Treatment:   45  " Pt transferred to floor by [facility] staff. Pt A&O4, VSS, on 4L. Pt updated on POC, all questions answered. Tele box on and monitor room notified. Pt oriented to room and educated to use call light for assistance.      mins   Total Treatment:     45   mins    Amy Rosenthal, PT  [place] License: [id number]

## 2024-03-01 NOTE — DISCHARGE INSTRUCTIONS
[facility] Hospitalist Progress Note    Admitting Date and Time: 2/29/2024  3:37 PM  Admit Dx: Seizure (HCC) [R56.9]    Subjective:  Patient is being followed for Seizure (HCC) [R56.9]   Patient seen and examined in the ED.  No acute events overnight.  No signs of seizures.    ROS: denies fever, chills, cp, sob, n/v, HA unless stated above.      metoprolol tartrate  25 mg Oral BID    bictegravir-emtricitab-tenofovir alafenamide  1 tablet Oral Daily    divalproex  500 mg Oral BID    lisinopril  5 mg Oral Daily    folic acid  1 mg Oral Daily    venlafaxine  37.5 mg Oral Daily    levETIRAcetam  500 mg Oral BID    sodium chloride flush  5-40 mL IntraVENous 2 times per day    thiamine  100 mg Oral Daily    sodium chloride flush  5-40 mL IntraVENous 2 times per day    enoxaparin  40 mg SubCUTAneous Daily     traZODone, 150 mg, Nightly PRN  sodium chloride flush, 5-40 mL, PRN  sodium chloride, , PRN  LORazepam, 1 mg, Q1H PRN   Or  LORazepam, 1 mg, Q1H PRN   Or  LORazepam, 2 mg, Q1H PRN   Or  LORazepam, 2 mg, Q1H PRN   Or  LORazepam, 3 mg, Q1H PRN   Or  LORazepam, 3 mg, Q1H PRN   Or  LORazepam, 4 mg, Q1H PRN   Or  LORazepam, 4 mg, Q1H PRN  benzocaine-menthol, 1 lozenge, Q2H PRN  benzonatate, 100 mg, TID PRN  calcium carbonate, 500 mg, TID PRN  hydrALAZINE, 10 mg, Q6H PRN  melatonin, 3 mg, Nightly PRN  Polyvinyl Alcohol-Povidone PF, 1 drop, PRN  sodium chloride, 1 spray, PRN  sodium chloride flush, 5-40 mL, PRN  sodium chloride, , PRN  potassium chloride, 40 mEq, PRN   Or  potassium alternative oral replacement, 40 mEq, PRN   Or  potassium chloride, 10 mEq, PRN  magnesium sulfate, 2,000 mg, PRN  ondansetron, 4 mg, Q8H PRN   Or  ondansetron, 4 mg, Q6H PRN  polyethylene glycol, 17 g, Daily PRN  acetaminophen, 650 mg, Q6H PRN   Or  acetaminophen, 650 mg, Q6H PRN         Objective:    /85   Pulse 88   Temp 98.1 °F (36.7 °C) (Oral)   Resp 18   Ht 1.626 m (5' 4\")   Wt 59 kg (130 lb)   SpO2 98%   BMI 22.31    Discharge Instructions per Venus Pérez M.D.    Follow up with GI in 4-6 weeks, Dr. Kam    DIET: renal    ACTIVITY: as tolerated    DIAGNOSIS: upper gi bleed from gastropathy at GE junction    Return to ER if bleeding recurs    Discharge Instructions    Discharged to home by car with relative. Discharged via walking, hospital escort: Yes.  Special equipment needed: Not Applicable    Be sure to schedule a follow-up appointment with your primary care doctor or any specialists as instructed.     Discharge Plan:   Influenza Vaccine Indication: Not indicated: Previously immunized this influenza season and > 8 years of age    I understand that a diet low in cholesterol, fat, and sodium is recommended for good health. Unless I have been given specific instructions below for another diet, I accept this instruction as my diet prescription.   Other diet: Renal    Special Instructions: None    · Is patient discharged on Warfarin / Coumadin?   No     Depression / Suicide Risk    As you are discharged from this [facility] facility, it is important to learn how to keep safe from harming yourself.    Recognize the warning signs:  · Abrupt changes in personality, positive or negative- including increase in energy   · Giving away possessions  · Change in eating patterns- significant weight changes-  positive or negative  · Change in sleeping patterns- unable to sleep or sleeping all the time   · Unwillingness or inability to communicate  · Depression  · Unusual sadness, discouragement and loneliness  · Talk of wanting to die  · Neglect of personal appearance   · Rebelliousness- reckless behavior  · Withdrawal from people/activities they love  · Confusion- inability to concentrate     If you or a loved one observes any of these behaviors or has concerns about self-harm, here's what you can do:  · Talk about it- your feelings and reasons for harming yourself  · Remove any means that you might use to hurt yourself  (examples: pills, rope, extension cords, firearm)  · Get professional help from the community (Mental Health, Substance Abuse, psychological counseling)  · Do not be alone:Call your Safe Contact- someone whom you trust who will be there for you.  · Call your local CRISIS HOTLINE 675-4635 or 819-940-7405  · Call your local Children's Mobile Crisis Response Team [place] (459) 439-8845 or www.[employer]  · Call the toll free National Suicide Prevention Hotlines   · National Suicide Prevention Lifeline 274-960-IMDI (9931)  · [employer] Line Network 800-SUICIDE (155-4123)      Upper Gastrointestinal Bleeding  Upper gastrointestinal (GI) bleeding is bleeding from the swallowing tube (esophagus), stomach, or the first part of the small intestine (duodenum). If you have upper GI bleeding, you may vomit blood or have bloody or black stools.  Bleeding can range from mild to serious or even life-threatening. If there is a lot of bleeding, you may need to stay in the hospital.  What are the causes?  This condition may be caused by:  · Ulcer disease of the stomach (peptic ulcer) or duodenum. This is the most common cause of GI bleeding.  · Inflammation, irritation, or swelling of the esophagus (esophagitis).  · A tear in the esophagus.  · Cancer of the esophagus, stomach, or duodenum.  · An abnormal or weakened blood vessel in one of the upper GI structures.  · A bleeding disorder that impairs the formation of blood clots and causes easy bleeding (coagulopathy).  What increases the risk?  The following factors may make you more likely to develop this condition:  · Being older than 60 years of age.  · Being male.  · Having another long-term disease, especially liver or kidney disease.  · Having a stomach infection caused by Helicobacter pylori bacteria.  · Having frequent or severe vomiting.  · Abusing alcohol.  · Taking certain medicines for a long time, such as:  ¨ NSAIDs.  ¨ Anticoagulants.  What are the signs  or symptoms?  Symptoms of this condition include:  · Vomiting blood.  · Black or maroon-colored stools.  · Bloody stools.  · Weakness or dizziness.  · Heartburn.  · Abdominal pain.  · Difficulty swallowing.  · Weight loss.  · Yellow eyes or skin (jaundice).  · Racing heartbeat.  How is this diagnosed?  This condition may be diagnosed based on:  · Your symptoms and medical history.  · A physical exam. During the exam, your health care provider will check for signs of blood loss, such as low blood pressure and a rapid pulse.  · Tests, such as:  ¨ Blood tests to measure your blood cell count and to check for other signs of blood loss and clotting ability.  ¨ Blood tests to check your liver and kidney function.  ¨ A chest X-ray to look for a tear in the esophagus.  ¨ Endoscopy. In this procedure, a flexible scope is put down your esophagus and into your stomach or duodenum to look for the source of bleeding.  ¨ Angiogram. This may be done if the source of bleeding is not found during endoscopy. For an angiogram, X-rays are taken after a dye is injected into your bloodstream.  ¨ Nasogastric tube insertion. This is a tube passed through your nose and down into your stomach. It may be connected to a source of gentle suction to see if any blood comes out.  How is this treated?  Treatment for this condition depends on the cause of the bleeding. Active bleeding is treated at the hospital. Treatment may include:  · Getting fluids through an IV tube inserted into one of your veins.  · Getting blood through an IV tube (blood transfusion).  · Getting high doses of medicine through the IV to lower stomach acid. This may be done to treat ulcer disease.  · Having endoscopy to treat an area of bleeding with high heat (coagulation), injections, or surgical clips.  · Having a procedure that involves first doing an angiogram and then blocking blood flow to the bleeding site (embolization).  · Stopping or changing some of your regular  medicines for a certain amount of time.  · Having other surgical procedures if initial treatments do not control bleeding.  Follow these instructions at home:  · Take over-the-counter and prescription medicines only as told by your health care provider. You may need to avoid NSAIDs or other medicines that increase bleeding.  · Do not drink alcohol.  · Drink enough fluid to keep your urine clear or pale yellow.  · Follow instructions from your health care provider about eating or drinking restrictions.  · Return to your normal activities as told by your health care provider. Ask your health care provider what activities are safe for you.  · Do not use any tobacco products, such as cigarettes, chewing tobacco, and e-cigarettes. If you need help quitting, ask your health care provider.  · Keep all follow-up visits as told by your health care provider. This is important.  Contact a health care provider if:  · You have abdominal pain or heartburn.  · You have unexplained weight loss.  · You have trouble swallowing.  · You have frequent vomiting.  · You develop jaundice.  · You feel weak or dizzy.  · You need help to stop smoking or drinking alcohol.  Get help right away if:  · You have vomiting with blood.  · You have blood in your stools.  · You have severe cramps in your back or abdomen.  · Your symptoms of upper GI bleeding come back after treatment.  This information is not intended to replace advice given to you by your health care provider. Make sure you discuss any questions you have with your health care provider.  Document Released: 05/04/2017 Document Revised: 08/20/2017 Document Reviewed: 05/04/2017  [employer]vier Interactive Patient Education © 2017 [employer]

## 2024-05-22 NOTE — ED NOTES
Results noted by erp-further orders recvd   [Alert] : alert [Normal Voice/Communication] : normal voice/communication [Healthy Appearing] : healthy appearing [No Acute Distress] : no acute distress [Sclera] : the sclera and conjunctiva were normal [Hearing Threshold Finger Rub Not [place]] : hearing was normal [Normal Appearance] : the appearance of the neck was normal [No Respiratory Distress] : no respiratory distress [No Acc Muscle Use] : no accessory muscle use [Respiration, Rhythm And Depth] : normal respiratory rhythm and effort [Auscultation Breath Sounds / Voice Sounds] : lungs were clear to auscultation bilaterally [Heart Rate And Rhythm] : heart rate was normal and rhythm regular [Normal S1, S2] : normal S1 and S2 [Bowel Sounds] : normal bowel sounds [Abdomen Tenderness] : non-tender [No Masses] : no abdominal mass palpated [Abdomen Soft] : soft [No CVA Tenderness] : no CVA  tenderness [Abnormal Walk] : normal gait [Normal Color / Pigmentation] : normal skin color and pigmentation [No Focal Deficits] : no focal deficits [Oriented To Time, Place, And Person] : oriented to person, place, and time

## 2025-05-28 NOTE — TELEPHONE ENCOUNTER
Smitha from [facility] lab called to let you know that this patient had some critical lab results. She did not want to give me the information as to which labs were critical. Pt has some lab results in from yesterday and her Hemoglobin was 4.9, Hematocrit was 17.5 and her Creatinine was 7.17, all critical.   Smitha (Sol): [phone]  ref#[id number]  Pt: [phone]    Thank you  
100% of the time

## 2025-07-27 NOTE — ED NOTES
Discharge home with instructions and follow up care reviewed and given to patient with verbal understanding     Assist patient out of the ER via wheel chair    No

## (undated) DEVICE — SYRINGE DISP. 60 CC LL - (30/BX, 12BX/CA)**WHEN THESE ARE GONE ORDER #500206**

## (undated) DEVICE — SYRINGE SAFETY 3 ML 18 GA X 1 1/2 BLUNT LL (100/BX 8BX/CA)

## (undated) DEVICE — GLOVE, LITE (PAIR)

## (undated) DEVICE — PROBE ERBE FIAPC 2.3MM (10EA/BX)

## (undated) DEVICE — GOWN WARMING STANDARD FLEX - (30/CA)

## (undated) DEVICE — ELECTRODE DUAL RETURN W/ CORD - (50/PK)

## (undated) DEVICE — TUBING CLEARLINK DUO-VENT - C-FLO (48EA/CA)

## (undated) DEVICE — FORCEP RADIAL JAW 4 STANDARD CAPACITY W/NEEDLE 240CM (40EA/BX)

## (undated) DEVICE — MASK WITH FACE SHIELD (25/BX 4BX/CA)

## (undated) DEVICE — LACTATED RINGERS INJ 1000 ML - (14EA/CA 60CA/PF)

## (undated) DEVICE — SYRINGE 12 CC LUER TIP - (80/BX) OBSOLETE ITEM

## (undated) DEVICE — KIT CUSTOM PROCEDURE SINGLE FOR ENDO  (15/CA)

## (undated) DEVICE — NEPTUNE 4 PORT MANIFOLD - (20/PK)

## (undated) DEVICE — TUBE CONNECTING SUCTION - CLEAR PLASTIC STERILE 72 IN (50EA/CA)

## (undated) DEVICE — BLOCK BITE ENDOSCOPIC 2809 - (100/BX) INTERMEDIATE

## (undated) DEVICE — SYRINGE SAFETY 10 ML 18 GA X 1 1/2 BLUNT LL (100/BX 4BX/CA)

## (undated) DEVICE — KIT ANESTHESIA W/CIRCUIT & 3/LT BAG W/FILTER (20EA/CA)

## (undated) DEVICE — WATER IRRIGATION STERILE 1000ML (12EA/CA)

## (undated) DEVICE — KIT  I.V. START (100EA/CA)

## (undated) DEVICE — SENSOR SPO2 ADULT LNCS ADTX (20/BX) ORDER ITEM #19593

## (undated) DEVICE — CORDS BIPOLAR COAGULATION - 12FT STERILE DISP. (10EA/BX)

## (undated) DEVICE — MASK ANESTHESIA ADULT  - (100/CA)

## (undated) DEVICE — TUBE E-T HI-LO CUFF 6.5MM (10EA/BX)

## (undated) DEVICE — SYRINGE SAFETY 5 ML 18 GA X 1-1/2 BLUNT LL (100/BX 4BX/CA)

## (undated) DEVICE — SPONGE GAUZE NON-STERILE 4X4 - (2000/CA 10PK/CA)

## (undated) DEVICE — GOWN SURGEONS LARGE - (32/CA)

## (undated) DEVICE — SET INTRO MIRCROPUNCTURE - MPIS-501-SST

## (undated) DEVICE — GLOVE BIOGEL SZ 8 SURGICAL PF LTX - (50PR/BX 4BX/CA)

## (undated) DEVICE — TUBE E-T HI-LO CUFF 7.0MM (10EA/PK)

## (undated) DEVICE — TOWEL STOP TIMEOUT SAFETY FLAG (40EA/CA)

## (undated) DEVICE — SPONGE GAUZESTER. 2X2 4-PL - (2/PK 50PK/BX 30BX/CS)

## (undated) DEVICE — BITE BLOCK ADULT 60FR (100EA/CA)

## (undated) DEVICE — CANNULA O2 COMFORT SOFT EAR ADULT 7 FT TUBING (50/CA)

## (undated) DEVICE — SUTURE GENERAL

## (undated) DEVICE — SET I.V. EXTENSION DIAL-A- - FLOW REGULATOR (48EA/CA)

## (undated) DEVICE — BOVIE BLADE COATED - (50/PK)

## (undated) DEVICE — SPEEDBAND SUPERVIEW SUPER 7

## (undated) DEVICE — CATHETER RFA ENDOSCOPIC CHANNEL

## (undated) DEVICE — CHLORAPREP 26 ML APPLICATOR - ORANGE TINT(25/CA)

## (undated) DEVICE — GVL 3 STAT DISPOSABLE - (10/BX)

## (undated) DEVICE — TOWELS CLOTH SURGICAL - (4/PK 20PK/CA)

## (undated) DEVICE — TUBE E-T HI-LO CUFF 8.0MM (10EA/PK)

## (undated) DEVICE — TUBE SUCTION YANKAUER  1/4 X 6FT (20EA/CA)"

## (undated) DEVICE — SENSOR OXIMETER ADULT SPO2 RD SET (20EA/BX)

## (undated) DEVICE — DEVICE HEMOSTATIC CLIPPING RESOLUTION 360 DEGREES (20EA/BX)

## (undated) DEVICE — CATHERTER CLEAR SINGLE USE INJECTION THERAPY NEEDLE 25GA X 4MM  2.3MM X 240CM (5EA/BX)

## (undated) DEVICE — PAD PREP 24 X 48 CUFFED - (100/CA)

## (undated) DEVICE — TUBE E-T HI-LO CUFF 7.5MM (10EA/PK)

## (undated) DEVICE — SET EXTENSION WITH 2 PORTS (48EA/CA) ***PART #2C8610 IS A SUBSTITUTE*****

## (undated) DEVICE — SYRINGE 30 ML LL (56/BX)

## (undated) DEVICE — SUTURE 4-0 PROLENE PS-2 18 (36PK/BX)"

## (undated) DEVICE — PROBE CIRCUMFERENTIAL 2.3CM X 220CM (10EA/CA)

## (undated) DEVICE — SYRINGE ALLIANCE INFLATION (5EA/BX)

## (undated) DEVICE — ELECTRODE 850 FOAM ADHESIVE - HYDROGEL RADIOTRNSPRNT (50/PK)

## (undated) DEVICE — BASIN EMESIS DISP. - (250/CA)

## (undated) DEVICE — TUBE E-T HI-LO CUFF 8.5MM (10EA/PK)

## (undated) DEVICE — CANNULA W/ SUPPLY TUBING O2 - (50/CA)

## (undated) DEVICE — CANISTER SUCTION RIGID RED 1500CC (40EA/CA)

## (undated) DEVICE — SODIUM CHL. INJ. 0.9% 500ML (24EA/CA 50CA/PF)

## (undated) DEVICE — PROBE CIRCUMFERENTIAL 2.3CM X 220CM

## (undated) DEVICE — SNARECAPTIVATOR II - 33MM ROUND STIFF (10/BX)

## (undated) DEVICE — TRAP POLYP E-TRAP (25EA/BX)

## (undated) DEVICE — DECANTER FLD BLS - (50/CA)

## (undated) DEVICE — GOLD PROBE 7FR (5/BX)

## (undated) DEVICE — SUTURE CV

## (undated) DEVICE — CATHETER IV SAFETY 20 GA X 1-1/4 (50/BX)

## (undated) DEVICE — SET LEADWIRE 5 LEAD BEDSIDE DISPOSABLE ECG (1SET OF 5/EA)

## (undated) DEVICE — CAPTIVATOR II-15MM ROUND STIFF

## (undated) DEVICE — DRESSING TRANSPARENT FILM TEGADERM 2.375 X 2.75"  (100EA/BX)"

## (undated) DEVICE — CAPTIVATOR II-25MM ROUND STIFF

## (undated) DEVICE — CATHETER IV SAFETY 22 GA X 1 (50EA/BX)

## (undated) DEVICE — BASKET RETRIEVAL TWISTER PLUS 26MM

## (undated) DEVICE — SYRINGE DISP. 50CC LS - (40/BX)